# Patient Record
Sex: FEMALE | Race: WHITE | Employment: UNEMPLOYED | URBAN - METROPOLITAN AREA
[De-identification: names, ages, dates, MRNs, and addresses within clinical notes are randomized per-mention and may not be internally consistent; named-entity substitution may affect disease eponyms.]

---

## 2017-01-19 ENCOUNTER — HOSPITAL ENCOUNTER (OUTPATIENT)
Dept: NON INVASIVE DIAGNOSTICS | Age: 67
Discharge: HOME | End: 2017-01-20
Attending: INTERNAL MEDICINE | Admitting: INTERNAL MEDICINE
Payer: COMMERCIAL

## 2017-01-19 ENCOUNTER — ANESTHESIA EVENT (OUTPATIENT)
Dept: SURGERY | Age: 67
End: 2017-01-19
Payer: COMMERCIAL

## 2017-01-19 ENCOUNTER — SURGERY (OUTPATIENT)
Age: 67
End: 2017-01-19

## 2017-01-19 ENCOUNTER — ANESTHESIA (OUTPATIENT)
Dept: SURGERY | Age: 67
End: 2017-01-19
Payer: COMMERCIAL

## 2017-01-19 LAB
ACT BLD: 137 SECS (ref 79–138)
ACT BLD: 301 SECS (ref 79–138)
GLUCOSE BLD STRIP.AUTO-MCNC: 102 MG/DL (ref 65–100)
GLUCOSE BLD STRIP.AUTO-MCNC: 109 MG/DL (ref 65–100)
GLUCOSE BLD STRIP.AUTO-MCNC: 122 MG/DL (ref 65–100)
GLUCOSE BLD STRIP.AUTO-MCNC: 127 MG/DL (ref 65–100)
SERVICE CMNT-IMP: ABNORMAL

## 2017-01-19 PROCEDURE — 93613 INTRACARDIAC EPHYS 3D MAPG: CPT

## 2017-01-19 PROCEDURE — 77030018729 HC ELECTRD DEFIB PAD CARD -B

## 2017-01-19 PROCEDURE — 77030030806 HC PTCH ENSIT NAVX STJU -G

## 2017-01-19 PROCEDURE — C1731 CATH, EP, 20 OR MORE ELEC: HCPCS

## 2017-01-19 PROCEDURE — 74011250636 HC RX REV CODE- 250/636: Performed by: INTERNAL MEDICINE

## 2017-01-19 PROCEDURE — 82962 GLUCOSE BLOOD TEST: CPT

## 2017-01-19 PROCEDURE — 77030008684 HC TU ET CUF COVD -B: Performed by: NURSE ANESTHETIST, CERTIFIED REGISTERED

## 2017-01-19 PROCEDURE — 74011250637 HC RX REV CODE- 250/637: Performed by: INTERNAL MEDICINE

## 2017-01-19 PROCEDURE — 93656 COMPRE EP EVAL ABLTJ ATR FIB: CPT

## 2017-01-19 PROCEDURE — 93655 ICAR CATH ABLTJ DSCRT ARRHYT: CPT

## 2017-01-19 PROCEDURE — 76060000037 HC ANESTHESIA 3 TO 3.5 HR

## 2017-01-19 PROCEDURE — C1894 INTRO/SHEATH, NON-LASER: HCPCS

## 2017-01-19 PROCEDURE — 77030033352 HC TBNG IRR PMP COOL PNT STJU -B

## 2017-01-19 PROCEDURE — 77030011640 HC PAD GRND REM COVD -A

## 2017-01-19 PROCEDURE — 77030029065 HC DRSG HEMO QCLOT ZMED -B

## 2017-01-19 PROCEDURE — 74011250636 HC RX REV CODE- 250/636

## 2017-01-19 PROCEDURE — 77030034850

## 2017-01-19 PROCEDURE — C1893 INTRO/SHEATH, FIXED,NON-PEEL: HCPCS

## 2017-01-19 PROCEDURE — C1769 GUIDE WIRE: HCPCS

## 2017-01-19 PROCEDURE — 77030026438 HC STYL ET INTUB CARD -A: Performed by: NURSE ANESTHETIST, CERTIFIED REGISTERED

## 2017-01-19 PROCEDURE — 93325 DOPPLER ECHO COLOR FLOW MAPG: CPT

## 2017-01-19 PROCEDURE — C1766 INTRO/SHEATH,STRBLE,NON-PEEL: HCPCS

## 2017-01-19 PROCEDURE — 85347 COAGULATION TIME ACTIVATED: CPT

## 2017-01-19 PROCEDURE — 76210000006 HC OR PH I REC 0.5 TO 1 HR

## 2017-01-19 PROCEDURE — 77030010880 HC CBL EP SUPRME STJU -C

## 2017-01-19 PROCEDURE — C1759 CATH, INTRA ECHOCARDIOGRAPHY: HCPCS

## 2017-01-19 PROCEDURE — 77030013797 HC KT TRNSDUC PRSSR EDWD -A

## 2017-01-19 PROCEDURE — 77030019908 HC STETH ESOPH SIMS -A: Performed by: NURSE ANESTHETIST, CERTIFIED REGISTERED

## 2017-01-19 PROCEDURE — 77030003700 HC NDL TSEPTL STJU -C

## 2017-01-19 RX ORDER — DIPHENHYDRAMINE HYDROCHLORIDE 50 MG/ML
12.5 INJECTION, SOLUTION INTRAMUSCULAR; INTRAVENOUS AS NEEDED
Status: DISCONTINUED | OUTPATIENT
Start: 2017-01-19 | End: 2017-01-19 | Stop reason: HOSPADM

## 2017-01-19 RX ORDER — CLONAZEPAM 0.5 MG/1
0.5 TABLET ORAL
Status: DISCONTINUED | OUTPATIENT
Start: 2017-01-19 | End: 2017-01-20 | Stop reason: HOSPADM

## 2017-01-19 RX ORDER — SODIUM CHLORIDE, SODIUM LACTATE, POTASSIUM CHLORIDE, CALCIUM CHLORIDE 600; 310; 30; 20 MG/100ML; MG/100ML; MG/100ML; MG/100ML
25 INJECTION, SOLUTION INTRAVENOUS CONTINUOUS
Status: DISCONTINUED | OUTPATIENT
Start: 2017-01-19 | End: 2017-01-19 | Stop reason: HOSPADM

## 2017-01-19 RX ORDER — HEPARIN SODIUM 200 [USP'U]/100ML
500 INJECTION, SOLUTION INTRAVENOUS ONCE
Status: COMPLETED | OUTPATIENT
Start: 2017-01-19 | End: 2017-01-19

## 2017-01-19 RX ORDER — ATORVASTATIN CALCIUM 40 MG/1
40 TABLET, FILM COATED ORAL
Status: DISCONTINUED | OUTPATIENT
Start: 2017-01-19 | End: 2017-01-20 | Stop reason: HOSPADM

## 2017-01-19 RX ORDER — MIDAZOLAM HYDROCHLORIDE 1 MG/ML
INJECTION, SOLUTION INTRAMUSCULAR; INTRAVENOUS AS NEEDED
Status: DISCONTINUED | OUTPATIENT
Start: 2017-01-19 | End: 2017-01-19 | Stop reason: HOSPADM

## 2017-01-19 RX ORDER — PROPOFOL 10 MG/ML
INJECTION, EMULSION INTRAVENOUS AS NEEDED
Status: DISCONTINUED | OUTPATIENT
Start: 2017-01-19 | End: 2017-01-19 | Stop reason: HOSPADM

## 2017-01-19 RX ORDER — ONDANSETRON 2 MG/ML
4 INJECTION INTRAMUSCULAR; INTRAVENOUS
Status: DISCONTINUED | OUTPATIENT
Start: 2017-01-19 | End: 2017-01-20 | Stop reason: HOSPADM

## 2017-01-19 RX ORDER — VENLAFAXINE 37.5 MG/1
75 TABLET ORAL 2 TIMES DAILY WITH MEALS
Status: DISCONTINUED | OUTPATIENT
Start: 2017-01-19 | End: 2017-01-20 | Stop reason: HOSPADM

## 2017-01-19 RX ORDER — SODIUM CHLORIDE 9 MG/ML
INJECTION, SOLUTION INTRAVENOUS
Status: DISCONTINUED | OUTPATIENT
Start: 2017-01-19 | End: 2017-01-19 | Stop reason: HOSPADM

## 2017-01-19 RX ORDER — ONDANSETRON 2 MG/ML
4 INJECTION INTRAMUSCULAR; INTRAVENOUS AS NEEDED
Status: DISCONTINUED | OUTPATIENT
Start: 2017-01-19 | End: 2017-01-19 | Stop reason: HOSPADM

## 2017-01-19 RX ORDER — AMIODARONE HYDROCHLORIDE 200 MG/1
200 TABLET ORAL 2 TIMES DAILY
Status: CANCELLED | OUTPATIENT
Start: 2017-01-19

## 2017-01-19 RX ORDER — GUAIFENESIN 100 MG/5ML
81 LIQUID (ML) ORAL DAILY
Status: DISCONTINUED | OUTPATIENT
Start: 2017-01-20 | End: 2017-01-20 | Stop reason: HOSPADM

## 2017-01-19 RX ORDER — MORPHINE SULFATE 10 MG/ML
2 INJECTION, SOLUTION INTRAMUSCULAR; INTRAVENOUS
Status: DISCONTINUED | OUTPATIENT
Start: 2017-01-19 | End: 2017-01-19 | Stop reason: HOSPADM

## 2017-01-19 RX ORDER — HEPARIN SODIUM 10000 [USP'U]/100ML
INJECTION, SOLUTION INTRAVENOUS
Status: COMPLETED
Start: 2017-01-19 | End: 2017-01-19

## 2017-01-19 RX ORDER — SODIUM CHLORIDE 0.9 % (FLUSH) 0.9 %
5-10 SYRINGE (ML) INJECTION EVERY 8 HOURS
Status: DISCONTINUED | OUTPATIENT
Start: 2017-01-19 | End: 2017-01-20 | Stop reason: HOSPADM

## 2017-01-19 RX ORDER — LISINOPRIL 20 MG/1
20 TABLET ORAL DAILY
Status: DISCONTINUED | OUTPATIENT
Start: 2017-01-20 | End: 2017-01-20 | Stop reason: HOSPADM

## 2017-01-19 RX ORDER — HEPARIN SODIUM 1000 [USP'U]/ML
0-30000 INJECTION, SOLUTION INTRAVENOUS; SUBCUTANEOUS
Status: DISCONTINUED | OUTPATIENT
Start: 2017-01-19 | End: 2017-01-19 | Stop reason: HOSPADM

## 2017-01-19 RX ORDER — ACETAMINOPHEN 325 MG/1
650 TABLET ORAL
Status: DISCONTINUED | OUTPATIENT
Start: 2017-01-19 | End: 2017-01-20 | Stop reason: HOSPADM

## 2017-01-19 RX ORDER — HEPARIN SODIUM 10000 [USP'U]/100ML
2000 INJECTION, SOLUTION INTRAVENOUS CONTINUOUS
Status: DISCONTINUED | OUTPATIENT
Start: 2017-01-19 | End: 2017-01-19 | Stop reason: HOSPADM

## 2017-01-19 RX ORDER — SODIUM CHLORIDE 0.9 % (FLUSH) 0.9 %
5-10 SYRINGE (ML) INJECTION EVERY 8 HOURS
Status: CANCELLED | OUTPATIENT
Start: 2017-01-19

## 2017-01-19 RX ORDER — CARVEDILOL 12.5 MG/1
25 TABLET ORAL 2 TIMES DAILY WITH MEALS
Status: DISCONTINUED | OUTPATIENT
Start: 2017-01-19 | End: 2017-01-20 | Stop reason: HOSPADM

## 2017-01-19 RX ORDER — AMLODIPINE BESYLATE 5 MG/1
5 TABLET ORAL DAILY
Status: DISCONTINUED | OUTPATIENT
Start: 2017-01-20 | End: 2017-01-20 | Stop reason: HOSPADM

## 2017-01-19 RX ORDER — PROTAMINE SULFATE 10 MG/ML
25-100 INJECTION, SOLUTION INTRAVENOUS
Status: DISCONTINUED | OUTPATIENT
Start: 2017-01-19 | End: 2017-01-19 | Stop reason: HOSPADM

## 2017-01-19 RX ORDER — SODIUM CHLORIDE, SODIUM LACTATE, POTASSIUM CHLORIDE, CALCIUM CHLORIDE 600; 310; 30; 20 MG/100ML; MG/100ML; MG/100ML; MG/100ML
25 INJECTION, SOLUTION INTRAVENOUS CONTINUOUS
Status: CANCELLED | OUTPATIENT
Start: 2017-01-19 | End: 2017-01-20

## 2017-01-19 RX ORDER — BUMETANIDE 1 MG/1
1 TABLET ORAL DAILY
Status: DISCONTINUED | OUTPATIENT
Start: 2017-01-20 | End: 2017-01-20 | Stop reason: HOSPADM

## 2017-01-19 RX ORDER — HYDROMORPHONE HYDROCHLORIDE 1 MG/ML
.2-.5 INJECTION, SOLUTION INTRAMUSCULAR; INTRAVENOUS; SUBCUTANEOUS
Status: DISCONTINUED | OUTPATIENT
Start: 2017-01-19 | End: 2017-01-19 | Stop reason: HOSPADM

## 2017-01-19 RX ORDER — SODIUM CHLORIDE 0.9 % (FLUSH) 0.9 %
5-10 SYRINGE (ML) INJECTION AS NEEDED
Status: DISCONTINUED | OUTPATIENT
Start: 2017-01-19 | End: 2017-01-19 | Stop reason: HOSPADM

## 2017-01-19 RX ORDER — FENTANYL CITRATE 50 UG/ML
INJECTION, SOLUTION INTRAMUSCULAR; INTRAVENOUS AS NEEDED
Status: DISCONTINUED | OUTPATIENT
Start: 2017-01-19 | End: 2017-01-19 | Stop reason: HOSPADM

## 2017-01-19 RX ORDER — ENOXAPARIN SODIUM 100 MG/ML
100 INJECTION SUBCUTANEOUS ONCE
Status: COMPLETED | OUTPATIENT
Start: 2017-01-19 | End: 2017-01-19

## 2017-01-19 RX ORDER — FENTANYL CITRATE 50 UG/ML
25 INJECTION, SOLUTION INTRAMUSCULAR; INTRAVENOUS
Status: DISCONTINUED | OUTPATIENT
Start: 2017-01-19 | End: 2017-01-19 | Stop reason: HOSPADM

## 2017-01-19 RX ORDER — PREGABALIN 100 MG/1
100 CAPSULE ORAL 2 TIMES DAILY
Status: DISCONTINUED | OUTPATIENT
Start: 2017-01-19 | End: 2017-01-20 | Stop reason: HOSPADM

## 2017-01-19 RX ORDER — MELATONIN
1000 DAILY
Status: DISCONTINUED | OUTPATIENT
Start: 2017-01-20 | End: 2017-01-20 | Stop reason: HOSPADM

## 2017-01-19 RX ORDER — SUCCINYLCHOLINE CHLORIDE 20 MG/ML
INJECTION INTRAMUSCULAR; INTRAVENOUS AS NEEDED
Status: DISCONTINUED | OUTPATIENT
Start: 2017-01-19 | End: 2017-01-19 | Stop reason: HOSPADM

## 2017-01-19 RX ORDER — OXYCODONE AND ACETAMINOPHEN 5; 325 MG/1; MG/1
1 TABLET ORAL
Status: DISCONTINUED | OUTPATIENT
Start: 2017-01-19 | End: 2017-01-20 | Stop reason: HOSPADM

## 2017-01-19 RX ORDER — SODIUM CHLORIDE 0.9 % (FLUSH) 0.9 %
5-10 SYRINGE (ML) INJECTION AS NEEDED
Status: CANCELLED | OUTPATIENT
Start: 2017-01-19

## 2017-01-19 RX ORDER — SODIUM CHLORIDE 0.9 % (FLUSH) 0.9 %
5-10 SYRINGE (ML) INJECTION AS NEEDED
Status: DISCONTINUED | OUTPATIENT
Start: 2017-01-19 | End: 2017-01-20 | Stop reason: HOSPADM

## 2017-01-19 RX ADMIN — SODIUM CHLORIDE: 9 INJECTION, SOLUTION INTRAVENOUS at 12:15

## 2017-01-19 RX ADMIN — Medication 10 ML: at 22:10

## 2017-01-19 RX ADMIN — SODIUM CHLORIDE: 9 INJECTION, SOLUTION INTRAVENOUS at 15:48

## 2017-01-19 RX ADMIN — FENTANYL CITRATE 25 MCG: 50 INJECTION, SOLUTION INTRAMUSCULAR; INTRAVENOUS at 16:03

## 2017-01-19 RX ADMIN — FENTANYL CITRATE 25 MCG: 50 INJECTION, SOLUTION INTRAMUSCULAR; INTRAVENOUS at 16:01

## 2017-01-19 RX ADMIN — ENOXAPARIN SODIUM 100 MG: 100 INJECTION, SOLUTION INTRAVENOUS; SUBCUTANEOUS at 23:01

## 2017-01-19 RX ADMIN — ACETAMINOPHEN 650 MG: 325 TABLET, FILM COATED ORAL at 17:43

## 2017-01-19 RX ADMIN — FENTANYL CITRATE 100 MCG: 50 INJECTION, SOLUTION INTRAMUSCULAR; INTRAVENOUS at 12:58

## 2017-01-19 RX ADMIN — SUCCINYLCHOLINE CHLORIDE 160 MG: 20 INJECTION INTRAMUSCULAR; INTRAVENOUS at 12:58

## 2017-01-19 RX ADMIN — VENLAFAXINE 75 MG: 37.5 TABLET ORAL at 18:12

## 2017-01-19 RX ADMIN — PREGABALIN 100 MG: 100 CAPSULE ORAL at 19:08

## 2017-01-19 RX ADMIN — HEPARIN SODIUM 1000 UNITS: 200 INJECTION, SOLUTION INTRAVENOUS at 13:32

## 2017-01-19 RX ADMIN — HEPARIN SODIUM 12000 UNITS: 1000 INJECTION, SOLUTION INTRAVENOUS; SUBCUTANEOUS at 13:46

## 2017-01-19 RX ADMIN — MIDAZOLAM HYDROCHLORIDE 2 MG: 1 INJECTION, SOLUTION INTRAMUSCULAR; INTRAVENOUS at 12:54

## 2017-01-19 RX ADMIN — PROTAMINE SULFATE 60 MG: 10 INJECTION, SOLUTION INTRAVENOUS at 15:47

## 2017-01-19 RX ADMIN — PROPOFOL 120 MG: 10 INJECTION, EMULSION INTRAVENOUS at 12:58

## 2017-01-19 RX ADMIN — CARVEDILOL 25 MG: 12.5 TABLET, FILM COATED ORAL at 18:12

## 2017-01-19 RX ADMIN — HEPARIN SODIUM 5000 UNITS: 1000 INJECTION, SOLUTION INTRAVENOUS; SUBCUTANEOUS at 14:33

## 2017-01-19 RX ADMIN — Medication 10 ML: at 18:13

## 2017-01-19 RX ADMIN — ATORVASTATIN CALCIUM 40 MG: 40 TABLET, FILM COATED ORAL at 22:04

## 2017-01-19 RX ADMIN — PROPOFOL 50 MG: 10 INJECTION, EMULSION INTRAVENOUS at 13:06

## 2017-01-19 RX ADMIN — OXYCODONE HYDROCHLORIDE AND ACETAMINOPHEN 1 TABLET: 5; 325 TABLET ORAL at 19:08

## 2017-01-19 RX ADMIN — CLONAZEPAM 0.5 MG: 0.5 TABLET ORAL at 22:04

## 2017-01-19 RX ADMIN — HEPARIN SODIUM 1000 UNITS/HR: 10000 INJECTION, SOLUTION INTRAVENOUS at 13:46

## 2017-01-19 NOTE — PERIOP NOTES
Handoff Report from Operating Room to PACU    Report received from Waylon Reyes RN and Alicia Nunn CRNA regarding Joyce Keith. Surgeon(s):  Case Anesthesia  And Procedure(s) (LRB):  PACU/RECOVERY (N/A)  confirmed   with allergies, drains and dressings discussed. Anesthesia type, drugs, patient history, complications, estimated blood loss, vital signs, intake and output, and last pain medication, lines and temperature were reviewed.

## 2017-01-19 NOTE — IP AVS SNAPSHOT
[address] [facility] 
[phone] Patient: Thiago Celaya MRN: [id number] HIT:7/26/1715 You are allergic to the following Allergen Reactions Sulfa (Sulfonamide Antibiotics) Swelling Amoxicillin Swelling Recent Documentation Height Weight Breastfeeding? BMI OB Status Smoking Status 1.753 m 99.8 kg No 32.49 kg/m2 Hysterectomy Never Smoker Emergency Contacts Name Discharge Info Relation Home Work Mobile Sameul Elder CAREGIVER [3] Spouse [3] [phone] About your hospitalization You were admitted on:  January 19, 2017 You last received care in the:  [facility] You were discharged on:  January 20, 2017 Unit phone number:  [phone] Why you were hospitalized Your primary diagnosis was:  Not on File Providers Seen During Your Hospitalizations Provider Role Specialty Primary office phone Tanna Estrada MD Attending Provider Cardiology [phone] Your Primary Care Physician (PCP) Primary Care Physician Office Phone Office Fax Trina Barrosl [phone] Follow-up Information Follow up With Details Comments Contact Info MD Miguel Garcia [address] [facility] 
[phone] Current Discharge Medication List  
  
START taking these medications Dose & Instructions Dispensing Information Comments Morning Noon Evening Bedtime  
 pantoprazole 40 mg tablet Commonly known as:  PROTONIX Your next dose is: Today, Tomorrow Other:  _________ Dose:  40 mg Take 1 Tab by mouth daily. Quantity:  30 Tab Refills:  0  
     
   
   
   
  
 potassium chloride 20 mEq tablet Commonly known as:  K-DURCONCHISOR-CON Your next dose is: Today, Tomorrow Other:  _________ Dose:  20 mEq Take 1 Tab by mouth daily. Quantity:  7 Tab Refills:  0  
     
   
   
   
  
 sucralfate 100 mg/mL suspension Commonly known as:  Georgeana Mallet Your next dose is: Today, Tomorrow Other:  _________ Dose:  1 g Take 10 mL by mouth two (2) times a day. Quantity:  600 mL Refills:  0 CONTINUE these medications which have NOT CHANGED Dose & Instructions Dispensing Information Comments Morning Noon Evening Bedtime ALLEGRA 180 mg tablet Generic drug:  fexofenadine Your next dose is: Today, Tomorrow Other:  _________ Dose:  180 mg Take 180 mg by mouth daily as needed. Refills:  0  
     
   
   
   
  
 amLODIPine 5 mg tablet Commonly known as:  Johnnie Vicente Your next dose is: Today, Tomorrow Other:  _________ Refills:  0  
     
   
   
   
  
 apixaban 5 mg tablet Commonly known as:  Flash Hassan Your next dose is: Today, Tomorrow Other:  _________ Dose:  5 mg Take 1 Tab by mouth two (2) times a day. Quantity:  60 Tab Refills:  11  
     
   
   
   
  
 aspirin 81 mg chewable tablet Your next dose is: Today, Tomorrow Other:  _________ Dose:  81 mg Take 81 mg by mouth daily. Refills:  0  
     
   
   
   
  
 atorvastatin 40 mg tablet Commonly known as:  LIPITOR Your next dose is: Today, Tomorrow Other:  _________ Dose:  40 mg Take 1 Tab by mouth nightly. Quantity:  30 Tab Refills:  12  
     
   
   
   
  
 bumetanide 1 mg tablet Commonly known as:  Aminata Garcia Your next dose is: Today, Tomorrow Other:  _________ Take 1 tablet by mouth in the morning on Tuesday and Thursday, 2 tablets on Monday, Wednesday, and Friday. Refills:  0  
     
   
   
   
  
 carvedilol 25 mg tablet Commonly known as:  Hafsa Locket  
   
 Your next dose is: Today, Tomorrow Other:  _________ Dose:  25 mg Take 25 mg by mouth two (2) times daily (with meals). Indications: HYPERTENSION Refills:  0 CENTRUM SILVER Tab tablet Generic drug:  multivitamins-minerals-lutein Your next dose is: Today, Tomorrow Other:  _________ Take  by mouth. Refills:  0  
     
   
   
   
  
 clonazePAM 0.5 mg tablet Commonly known as:  Reubens Boop Your next dose is: Today, Tomorrow Other:  _________ Dose:  0.5 mg Take 0.5 mg by mouth nightly. Refills:  0  
     
   
   
   
  
 lisinopril 20 mg tablet Commonly known as:  Donnald Code Your next dose is: Today, Tomorrow Other:  _________ Dose:  20 mg Take 1 Tab by mouth daily. Quantity:  30 Tab Refills:  12 LYRICA 100 mg capsule Generic drug:  pregabalin Your next dose is: Today, Tomorrow Other:  _________ TAKE ONE CAPSULE BY MOUTH 3 TIMES A DAY Quantity:  90 Cap Refills:  2 Not to exceed 3 additional fills before 12/25/2016  
    
   
   
   
  
 venlafaxine 75 mg tablet Commonly known as:  [facility] Your next dose is: Today, Tomorrow Other:  _________ TAKE 2 TABLETS TWICE A DAY Quantity:  360 Tab Refills:  3 VITAMIN D3 1,000 unit Cap Generic drug:  cholecalciferol Your next dose is: Today, Tomorrow Other:  _________ Take  by mouth. Refills:  0 STOP taking these medications   
 amiodarone 100 mg tablet Commonly known as:  Griselda Borer Where to Get Your Medications Information on where to get these meds will be given to you by the nurse or doctor. ! Ask your nurse or doctor about these medications  
  pantoprazole 40 mg tablet  
 potassium chloride 20 mEq tablet  
 sucralfate 100 mg/mL suspension Discharge Instructions POST-EP STUDY AND ABLATION DISCHARGE INSTRUCTIONS: 
 
You had an atrial fibrillation ablation using radiofrequency energy on 1/19 with Dr. Nikolas Mcintosh. Do not drive, operate any machinery, or sign any legal documents for 24 hours after your procedure. You must have someone to drive you home. You may take a shower 24 hours after your cardiac procedure. Be sure to get the dressing wet and then remove it; gently wash the area with warm soapy water. Pat dry and leave open to air. To help prevent infections, be sure to keep the cath site clean and dry. No lotions, creams, powders, ointments, etc. in the cath site for approximately 1 week. ? Do not take a tub bath, get in a hot tub or swimming pool for approximately 5 days or until the cath site is completely healed. ? No strenuous activity or heavy lifting over 20 lbs. for 7 days. ? After your procedure, some bruising or discomfort is common during the healing process. Tylenol, 1-2 tablets every 6 hours as needed, is recommended if you experience any discomfort. If you experience any signs or symptoms of infection such as fever, chills, or poorly healing incision, persistent tenderness or swelling in the groin, redness and/or warmth to the touch, numbness, significant tingling or pain at the groin site or affected extremity, rash, drainage from the site, or if the leg feels tight or swollen, call your physician right away. ? If bleeding at the site occurs, take a clean gauze pad and apply direct pressure to the groin just above the puncture site, and call your physician right away. If your procedure involved ablation therapy, you may feel some mild or vague chest discomfort due to delivery of heat therapy to the heart muscle. This should resolve in 1-2 days.   If it gets worse or is associated with shortness of breath, dizziness, loss of consciousness, call your physician right away or call 911 if emergency medical care is needed. Discharge Orders None Introducing [facility] SERVICES! Elder Gontiti introduces [employer] patient portal. Now you can access parts of your medical record, email your doctor's office, and request medication refills online. 1. In your internet browser, go to [email or web address] 2. Click on the First Time User? Click Here link in the Sign In box. You will see the New Member Sign Up page. 3. Enter your [employer] Access Code exactly as it appears below. You will not need to use this code after youve completed the sign-up process. If you do not sign up before the expiration date, you must request a new code. · [employer] Access Code: [email or web address] Expires: 3/7/2017  8:55 AM 
 
4. Enter the last four digits of your Social Security Number (xxxx) and Date of Birth (mm/dd/yyyy) as indicated and click Submit. You will be taken to the next sign-up page. 5. Create a [employer] ID. This will be your [employer] login ID and cannot be changed, so think of one that is secure and easy to remember. 6. Create a [employer] password. You can change your password at any time. 7. Enter your Password Reset Question and Answer. This can be used at a later time if you forget your password. 8. Enter your e-mail address. You will receive e-mail notification when new information is available in [address] 9. Click Sign Up. You can now view and download portions of your medical record. 10. Click the Download Summary menu link to download a portable copy of your medical information. If you have questions, please visit the Frequently Asked Questions section of the [employer] website. Remember, [employer] is NOT to be used for urgent needs. For medical emergencies, dial 911. Now available from your iPhone and Android! General Information Please provide this summary of care documentation to your next provider. Patient Signature:  ____________________________________________________________ Date:  ____________________________________________________________  
  
Jacqlyn Newcomer Provider Signature:  ____________________________________________________________ Date:  ____________________________________________________________

## 2017-01-19 NOTE — ANESTHESIA POSTPROCEDURE EVALUATION
Post-Anesthesia Evaluation and Assessment    Patient: Patrice Sandhoff MRN: [id number]  SSN: [id number]    YOB: 1950  Age: 61 y.o. Sex: female       Cardiovascular Function/Vital Signs  Visit Vitals    /65 (BP 1 Location: Right arm, BP Patient Position: At rest)    Pulse 60    Temp 37.3 °C (99.1 °F)    Resp 16    Ht 5' 9\" (1.753 m)    Wt 99.8 kg (220 lb)    SpO2 93%    Breastfeeding No    BMI 32.49 kg/m2       Patient is status post general anesthesia for Procedure(s):  PACU/RECOVERY. Nausea/Vomiting: None    Postoperative hydration reviewed and adequate. Pain:  Pain Scale 1: Numeric (0 - 10) (01/19/17 1640)  Pain Intensity 1: 0 (01/19/17 1640)   Managed    Neurological Status:   Neuro (WDL): Exceptions to WDL (01/19/17 1620)  Neuro  Neurologic State: Drowsy; Eyes open to voice (01/19/17 1620)  Orientation Level: Oriented X4 (01/19/17 1620)  Cognition: Follows commands (01/19/17 1620)  Speech: Clear (01/19/17 1620)  LUE Motor Response: Purposeful (01/19/17 1620)  LLE Motor Response: Purposeful (01/19/17 1620)  RUE Motor Response: Purposeful (01/19/17 1620)  RLE Motor Response: Purposeful (01/19/17 1620)   At baseline    Mental Status and Level of Consciousness: Arousable    Pulmonary Status:   O2 Device: Nasal cannula (01/19/17 1645)   Adequate oxygenation and airway patent    Complications related to anesthesia: None    Post-anesthesia assessment completed.  No concerns    Signed By: Adri Lai MD     January 19, 2017

## 2017-01-19 NOTE — ANESTHESIA PREPROCEDURE EVALUATION
Anesthetic History   No history of anesthetic complications            Review of Systems / Medical History  Patient summary reviewed, nursing notes reviewed and pertinent labs reviewed    Pulmonary            Asthma        Neuro/Psych         Headaches (   Migraine with aura, without mention of intractable migraine without mention of status migrainosus) and psychiatric history     Cardiovascular    Hypertension      CHF  Dysrhythmias : atrial fibrillation  Pacemaker (for SSS)    Exercise tolerance: >4 METS  Comments: EF 20% in 2014 with mild MR  The last echo in 10/2015 showed an ejection fraction of 45% (an improvement  from 20% in 2014) with a left atrial diameter 4.1 cm.    GI/Hepatic/Renal     GERD           Endo/Other    Diabetes (diet controlled)    Obesity and arthritis     Other Findings   Comments: Spinal stenosis  Unspecified hereditary and idiopathic peripheral neuropathy      IBS         Physical Exam    Airway  Mallampati: III  TM Distance: 4 - 6 cm  Neck ROM: normal range of motion, short neck   Mouth opening: Diminished (comment)    Comments: Prominent overbite-suggest use/availability of glidescope Cardiovascular    Rhythm: irregular  Rate: normal         Dental         Pulmonary  Breath sounds clear to auscultation               Abdominal  GI exam deferred       Other Findings            Anesthetic Plan    ASA: 4  Anesthesia type: general            Anesthetic plan and risks discussed with: Patient and Family

## 2017-01-19 NOTE — PROCEDURES
[address]  [phone]    Patient ID:  Patient: Glenda Bauer  MRN: [id number]  Age: 61 y.o.  : 1950  Gender: female  Study Date: 2017    History:  This is a female with paroxysmal symptomatic atrial fibrillation despite amiodarone here for elective EP study and ablation.      Procedures Performed:    1. Comprehensive EP study with transseptal access L/R atrium, pulmonary vein isolation (PVI) (12846)    2. Additional left atrial ablation for atrial fibrillation/roof flutter (LA roof line) (10393)   3. Additional left atrial ablation for atypical atrial flutter (anterior LA line) (08074)    4. Intracardiac electrophysiologic 3-D mapping (75919)   5. Use and interpretation of intra cardiac echocardiography (63305-88)       Summary:    1. Successful pulmonary vein isolation for treatment of atrial fibrillation. Additional substrate work was done in the left atrium as noted above and below for left atrial flutter.    2. Antegrade conduction was midline and without preexcitation.      Plan:    1. Use of lovenox until therapeutic on oral anticoagulant.    2. Carafate and proton pump inhibitor for 4 weeks to protect from esophageal injury.        Follow up Plan:    1. F/U in office in 2 weeks.      Procedure description:    After consent was obtained, the patient was brought to the EP lab and sedated by general anesthesia by the anesthesiologist who remained in constant attendance throughout the case. One sheath was inserted into the left femoral vein and three sheaths into the right femoral vein in the usual fashion. A deflectable duodecapolar coronary sinus catheter was used for pacing and recording from the left atrium as well as right atrium. Initial catheters advanced into the heart under fluoroscopic guidance.   A roving catheter was used to pace and record from the right atrium, the His bundle location, and the right ventricular apex.      The baseline rhythm was sinus when atrial pacing was inhibited (, , QRS 94,  msec). The WBCL was 430 msec and AVNERP and AERP, 500/390 and 500/200 msec, respectively. Dual AV node physiology was not seen.     An intra-cardiac echo probe was advanced into the right atrium and used to visualize the valves, the left atrium, the pulmonary veins, and the fossa ovale. There was a trivial pericardial effusion. Under fluoroscopic and echocardiographic guidance, a double transseptal puncture was carried out in the usual fashion using the SL-1 sheath and BRK-1 needle. An Agilis sheath was exchanged over a wire and the SL-1 sheath was kept during the second access. Heparin was given after the first transseptal puncture and boluses and infusion were given with a goal ACT of >300 sec.        A deflectable, irrigated ablation catheter and a 20 pole spiral catheter were advanced into the left atrium. Using the [address] [place] [address] three dimensional electroanatomic mapping system, a 3D map was created of the left atrium, all pulmonary veins, and the left atrial appendage.      The ostia of the 4 pulmonary veins was mapped and revealed electrical isolation of the right veins. There was evidence of incomplete isolation of the left veins. So, PVI was performed. Using a spiral-guided approach, a series of ablation lesions were given around the ostium of both left pulmonary veins until isolation was achieved as evidenced by lack of recorded pulmonary vein potentials, entrance block, and exit block from pacing within the pulmonary veins. Pulmonary vein isolation was confirmed by placing the spiral catheter in all veins after ostial ablation was complete.      Additional ablation was performed distinct from the PVI. A left atrial roof line was performed using linear ablation with radiofrequency energy, bridging the superior pulmonary veins.  This was performed as substrate work for atrial fibrillation.     Additional ablation was performed beyond PVI for a separate mechanism, a presumed left atrial flutter seen on prior pacemaker monitoring. Linear ablation along the anterior LA from the roof line to the anterior mitral valve was performed. This line was able to block conduction across the mitral isthmus as determined by differential pacing. Pacing from both sides of the cavotricuspid isthmus revealed an intact CTI line from a prior ablation. No work was done here.     During ablation, the temperature was continually monitored in the esophagus and lesions were stopped if there was any temperature rise. The largest increase was to 38.8 degrees Celsius on one occasion.      Once the ablation lesion set was completed, the patient remained in normal sinus rhythm. The catheters were withdrawn to the inferior vena cava and the heparin was reversed with a test dose of protamine followed by the full dose to a total of 60 mg.      Repeat scanning with the intracardiac ultrasound did not show any significant change to the trivial pericardial effusion. The patient was extubated after his sheaths removed and sent to the recovery area.      Preoperative Diagnosis: As above. Postoperative Diagnosis: As above. Procedure: As above. Surgeon(s) and Role: Joyce Hidalgo MD - Primary    Anesthesia:  General.  Estimated Blood Loss: 40 cc. Specimens: * No specimens in log *    Findings: As above.   Complications: None.      Ablation time:  32.5 minutes with 21 applications  Fluoroscopy time: 19.1 minutes  Case time:  2 hours 11 minutes

## 2017-01-19 NOTE — H&P
[facility] [address]   HISTORY AND PHYSICAL       Name:  Miguelito Ramon   MR#:  [id number]   :  1950   Account #:  [de-identified]        Date of Adm:  2017       CHIEF COMPLAINT: Symptomatic atrial fibrillation. HISTORY OF PRESENT ILLNESS: This is a 56-year-old female with a   history of hypertensive heart disease with heart failure, mixed ischemic   and tachycardia mediated cardiomyopathy diagnosed in , prior   percutaneous intervention to the left anterior descending artery, dual-  chamber pacemaker implanted for sick sinus syndrome, here for atrial   fibrillation ablation. She has had a history of a prior atrial fibrillation   ablation in 2016 using a cryoballoon approach. After that   ablation, she had some short episodes of arrhythmia which seemed to   subside. Her amiodarone was reduced and then she had an increased   burden of arrhythmia. She is here today for more comprehensive ablation for recurrent atrial   fibrillation. ALLERGIES:   1. SULFA DRUGS. 2. AUGMENTIN. FAMILY HISTORY: Noncontributory. SOCIAL HISTORY: Noncontributory. REVIEW OF SYSTEMS: Noncontributory except noted as above. PHYSICAL EXAMINATION   VITAL SIGNS: Pulse 60, blood pressure 148/79, respirations 17,   oxygen saturation 97% on room air. GENERAL: Nondiaphoretic, not in acute distress. NECK: Supple, no palpable thyromegaly. CHEST: The left-sided pacemaker site is without evidence of erosion. RESPIRATORY: Unlabored, clear to auscultation bilaterally anteriorly. CARDIAC: Regular rate and rhythm. No murmur, rub, or gallop. No   peripheral edema. Palpable radial pulses bilaterally. ABDOMEN: Soft, nontender, nondistended. EXTREMITIES: No cyanosis or clubbing. MUSCULOSKELETAL: Walks with a cane. NEUROLOGIC: Awake, appropriate, grossly nonfocal. No resting   tremor.    TELEMETRY: Atrial pacing at 60 beats per minute. LABORATORY DATA: Outpatient labs were reviewed. IMPRESSION:   1. Paroxysmal symptomatic atrial fibrillation despite amiodarone   suppression therapy. 2. Prior atrial fibrillation ablation using the cryoballoon approach in   2016. She also had radiofrequency ablation of induced right atrial   flutter at that time. 3. Hypertensive heart disease with heart failure. 4. Chronic diastolic heart failure. 5. Chronic anticoagulation with Eliquis as well as chronic aspirin   therapy. 6. Recovered ischemic cardiomyopathy with a history of non-ST   elevation MI and proximal left anterior descending artery stenting in   April 2014. She has a mixed ischemic and tachycardic,   cardiomyopathy with improvement in ejection fraction since 2014. RECOMMENDATIONS: Given the potential benefits, risks, and   alternatives, she agrees to proceed with transesophageal echo. If there   is no intracardiac thrombus, then she will have a catheter ablation for   atrial fibrillation. Will have to do more substrate work and confirm   isolation of the pulmonary veins. MD Carlos Enrique Call / Faby   D:  01/19/2017   13:22   T:  01/19/2017   14:00   Job #:  [id number]

## 2017-01-19 NOTE — PROCEDURES
[address]  [phone]    Patient ID:  Patient: Sunil Alvarado  MRN: [id number]  Age: 61 y.o.  : 1950  Gender: female  Study Date: 2017    History: This is a female with recurrent symptomatic atrial fibrillation, off anticoagulation, in anticipation of an atrial fibrillation ablation. She will be screened for intracardiac thrombus.      PROCEDURE: Transesophageal echo, complete.     The patient was sedated by the anesthesiologist with general anesthesia. The probe was passed easily into the esophagus and images taken. The study included complete 2D imaging, M-mode, complete spectral Doppler, and color Doppler. Image quality was adequate. At the end, the probe was removed without issue. No complications.      Preoperative Diagnosis: As above. Postoperative Diagnosis: As above. Procedure: As above. Surgeon(s) and Role: Roberth Swain MD - Primary   Anesthesia:  General by the anesthesiologist.  Estimated Blood Loss: <5 cc. Specimens: * No specimens in log *   Findings: As below. Complications: None.         LEFT VENTRICLE: Size was normal. Ejection fraction was estimated to be 50%. There was mild concentric left ventricular hypertrophy. RIGHT VENTRICLE: The size was normal. Systolic function was normal. A pacemaker lead was seen coursing across the tricuspid valve into the cavity. LEFT ATRIUM: Size was mildly dilated. No thrombus in the left atrium or left atrial appendage. DOPPLER: Normal exit velocity was seen in sinus rhythm. RIGHT ATRIUM: Size was mildly dilated.  Pacemaker lead noted.     INTERATRIAL SEPTUM: No evidence of aneurysm, septal defect, or PFO by color flow doppler. MITRAL VALVE: Normal valve structure. DOPPLER: There was no evidence for stenosis. There was mild nonrheumatic regurgitation. No valve prolapse. AORTIC VALVE: The valve was trileaflet. DOPPLER: There was no stenosis.  There was no regurgitation. TRICUSPID VALVE: Normal valve structure. DOPPLER: There was trivial regurgitation. Could not obtain a reliable peak tricuspid regurgitant velocity to estimate pulmonary artery pressures. PULMONIC VALVE:  Poorly-visualized, no obvious defect. DOPPLER: There was trivial regurgitation. AORTA: The root exhibited normal size. The thoracic aorta had mild non-mobile plaque, and NO dissection or aneurysm. PERICARDIUM: There was no pericardial thickening or significant pericardial effusion.         IMPRESSION:  1. No intracardiac thrombus. 2. Mild non-rheumatic mitral valve regurgitation. 3. EF 50%. 4. Pacemaker leads visualized.

## 2017-01-19 NOTE — PROGRESS NOTES
Cardiac Cath Lab Recovery Arrival Note:      Kelsie Babinski arrived to Cardiac Cath Lab, Recovery Area. Staff introduced to patient. Patient identifiers verified with NAME and DATE OF BIRTH. Procedure verified with patient. Consent forms reviewed and signed by patient or authorized representative and verified. Allergies verified. Patient and family oriented to department. Patient and family informed of procedure and plan of care. Questions answered with review. Patient prepped for procedure, per orders from physician, prior to arrival.    Patient on cardiac monitor, non-invasive blood pressure, SPO2 monitor. On room air. Patient is A&Ox 3. Patient reports no complaints. Patient in stretcher, in low position, with side rails up, call bell within reach, patient instructed to call if assistance as needed. Patient prep in: [address], [place]. Family in: waiting room.    Prep by: Sheba Wilson RN

## 2017-01-20 VITALS
TEMPERATURE: 97.6 F | SYSTOLIC BLOOD PRESSURE: 110 MMHG | DIASTOLIC BLOOD PRESSURE: 59 MMHG | BODY MASS INDEX: 32.58 KG/M2 | WEIGHT: 220 LBS | RESPIRATION RATE: 18 BRPM | OXYGEN SATURATION: 95 % | HEIGHT: 69 IN | HEART RATE: 60 BPM

## 2017-01-20 LAB
ANION GAP BLD CALC-SCNC: 8 MMOL/L (ref 5–15)
BUN SERPL-MCNC: 17 MG/DL (ref 6–20)
BUN/CREAT SERPL: 13 (ref 12–20)
CALCIUM SERPL-MCNC: 7.9 MG/DL (ref 8.5–10.1)
CHLORIDE SERPL-SCNC: 111 MMOL/L (ref 97–108)
CO2 SERPL-SCNC: 29 MMOL/L (ref 21–32)
CREAT SERPL-MCNC: 1.33 MG/DL (ref 0.55–1.02)
ERYTHROCYTE [DISTWIDTH] IN BLOOD BY AUTOMATED COUNT: 15.2 % (ref 11.5–14.5)
GLUCOSE BLD STRIP.AUTO-MCNC: 97 MG/DL (ref 65–100)
GLUCOSE SERPL-MCNC: 98 MG/DL (ref 65–100)
HCT VFR BLD AUTO: 31.7 % (ref 35–47)
HGB BLD-MCNC: 10.5 G/DL (ref 11.5–16)
MCH RBC QN AUTO: 29.7 PG (ref 26–34)
MCHC RBC AUTO-ENTMCNC: 33.1 G/DL (ref 30–36.5)
MCV RBC AUTO: 89.8 FL (ref 80–99)
PLATELET # BLD AUTO: 139 K/UL (ref 150–400)
POTASSIUM SERPL-SCNC: 3 MMOL/L (ref 3.5–5.1)
RBC # BLD AUTO: 3.53 M/UL (ref 3.8–5.2)
SERVICE CMNT-IMP: NORMAL
SODIUM SERPL-SCNC: 148 MMOL/L (ref 136–145)
WBC # BLD AUTO: 6.1 K/UL (ref 3.6–11)

## 2017-01-20 PROCEDURE — 74011250637 HC RX REV CODE- 250/637: Performed by: INTERNAL MEDICINE

## 2017-01-20 PROCEDURE — 82962 GLUCOSE BLOOD TEST: CPT

## 2017-01-20 PROCEDURE — 85027 COMPLETE CBC AUTOMATED: CPT | Performed by: INTERNAL MEDICINE

## 2017-01-20 PROCEDURE — 80048 BASIC METABOLIC PNL TOTAL CA: CPT | Performed by: INTERNAL MEDICINE

## 2017-01-20 PROCEDURE — 36415 COLL VENOUS BLD VENIPUNCTURE: CPT | Performed by: INTERNAL MEDICINE

## 2017-01-20 RX ORDER — POTASSIUM CHLORIDE 20 MEQ/1
40 TABLET, EXTENDED RELEASE ORAL
Status: COMPLETED | OUTPATIENT
Start: 2017-01-20 | End: 2017-01-20

## 2017-01-20 RX ORDER — SUCRALFATE 1 G/10ML
1 SUSPENSION ORAL 2 TIMES DAILY
Qty: 600 ML | Refills: 0 | Status: SHIPPED | OUTPATIENT
Start: 2017-01-20 | End: 2018-01-22 | Stop reason: ALTCHOICE

## 2017-01-20 RX ORDER — PANTOPRAZOLE SODIUM 40 MG/1
40 TABLET, DELAYED RELEASE ORAL DAILY
Qty: 30 TAB | Refills: 0 | Status: SHIPPED | OUTPATIENT
Start: 2017-01-20 | End: 2018-02-22 | Stop reason: ALTCHOICE

## 2017-01-20 RX ORDER — POTASSIUM CHLORIDE 20 MEQ/1
20 TABLET, EXTENDED RELEASE ORAL DAILY
Qty: 7 TAB | Refills: 0 | Status: SHIPPED | OUTPATIENT
Start: 2017-01-20 | End: 2018-02-22 | Stop reason: ALTCHOICE

## 2017-01-20 RX ADMIN — Medication 10 ML: at 04:45

## 2017-01-20 RX ADMIN — ASPIRIN 81 MG 81 MG: 81 TABLET ORAL at 08:45

## 2017-01-20 RX ADMIN — PREGABALIN 100 MG: 100 CAPSULE ORAL at 08:45

## 2017-01-20 RX ADMIN — VITAMIN D, TAB 1000IU (100/BT) 1000 UNITS: 25 TAB at 08:45

## 2017-01-20 RX ADMIN — CARVEDILOL 25 MG: 12.5 TABLET, FILM COATED ORAL at 08:45

## 2017-01-20 RX ADMIN — BUMETANIDE 1 MG: 1 TABLET ORAL at 08:45

## 2017-01-20 RX ADMIN — POTASSIUM CHLORIDE 40 MEQ: 20 TABLET, EXTENDED RELEASE ORAL at 09:34

## 2017-01-20 RX ADMIN — MULTIPLE VITAMINS W/ MINERALS TAB 1 TABLET: TAB at 08:46

## 2017-01-20 RX ADMIN — VENLAFAXINE 75 MG: 37.5 TABLET ORAL at 08:46

## 2017-01-20 RX ADMIN — AMLODIPINE BESYLATE 5 MG: 5 TABLET ORAL at 08:45

## 2017-01-20 RX ADMIN — LISINOPRIL 20 MG: 20 TABLET ORAL at 08:45

## 2017-01-20 RX ADMIN — APIXABAN 5 MG: 5 TABLET, FILM COATED ORAL at 08:45

## 2017-01-20 NOTE — PROGRESS NOTES
1900 - Bedside report from [facility]. Paced. Percocet for back pain. Bedrest, bilat groins benign. +PPP. 2200 - OOB to chair and then walked to door to chair to bed with cane. No chest pain or shortness of breath. Paced on monitor. bilat groins benign. 0700 - Bedside report to RN. Paced.

## 2017-01-20 NOTE — PROGRESS NOTES
S/p Afib ablation using RFA. No hematoma. Ambulatory and taking oral.      Visit Vitals    /59 (BP 1 Location: Right arm, BP Patient Position: At rest)    Pulse 60    Temp 97.6 °F (36.4 °C)    Resp 18    Ht 5' 9\" (1.753 m)    Wt 99.8 kg (220 lb)    SpO2 95%    Breastfeeding No    BMI 32.49 kg/m2       ND, NAD.  RRR, no rub. Lungs CTAB. Bilateral groin sites OK. Awake, appropriate, neuro grossly nonfocal.    Tele:  A pacing. PLAN:  Discharge to home with F/U in the office in 2 weeks. Carafate and Protonix x 1 month. All questions answered. Patient is aware of signs and sx warranting urgent med F/U or calling 911.

## 2017-01-20 NOTE — DISCHARGE INSTRUCTIONS
POST-EP STUDY AND ABLATION DISCHARGE INSTRUCTIONS:    You had an atrial fibrillation ablation using radiofrequency energy on 1/19 with Dr. Yazmin Bolanos. Do not drive, operate any machinery, or sign any legal documents for 24 hours after your procedure. You must have someone to drive you home. You may take a shower 24 hours after your cardiac procedure. Be sure to get the dressing wet and then remove it; gently wash the area with warm soapy water. Pat dry and leave open to air. To help prevent infections, be sure to keep the cath site clean and dry. No lotions, creams, powders, ointments, etc. in the cath site for approximately 1 week.  Do not take a tub bath, get in a hot tub or swimming pool for approximately 5 days or until the cath site is completely healed.  No strenuous activity or heavy lifting over 20 lbs. for 7 days.  After your procedure, some bruising or discomfort is common during the healing process. Tylenol, 1-2 tablets every 6 hours as needed, is recommended if you experience any discomfort. If you experience any signs or symptoms of infection such as fever, chills, or poorly healing incision, persistent tenderness or swelling in the groin, redness and/or warmth to the touch, numbness, significant tingling or pain at the groin site or affected extremity, rash, drainage from the site, or if the leg feels tight or swollen, call your physician right away.  If bleeding at the site occurs, take a clean gauze pad and apply direct pressure to the groin just above the puncture site, and call your physician right away. If your procedure involved ablation therapy, you may feel some mild or vague chest discomfort due to delivery of heat therapy to the heart muscle. This should resolve in 1-2 days. If it gets worse or is associated with shortness of breath, dizziness, loss of consciousness, call your physician right away or call 911 if emergency medical care is needed.

## 2017-01-20 NOTE — PROGRESS NOTES
Discharge instructions given and gone over with pt. All questions answered. PIV and tele removed. Pts bilateral groin sites dry and intact with no signs of hematoma. Pt left with Rx's and with all belongings.

## 2017-01-20 NOTE — CARDIO/PULMONARY
C/P REHAB:  Chart reviewed. S/P ANGELIOT and ablation for atrial fibrillation. History significant for A FIB, systolic CHF, HTN, DM, neuropathy, depression/anxiety, IBS, and idiopathic peripheral neuropathy. LVEF 50% on ANGELITO 1/19/17. (Had been 20% in 2014.)  Nonsmoker. Met with patient and her . Printed material given and discussed regarding cardiac ablation, post ablation instructions, healthy heart/low salt diet, and atrial fibrillation. Discussed post ablation restrictions (avoiding heavy lifting and keeping the site clean and dry), what to do if bleeding/bruising is noted at the insertion site and when to call the doctor. Also gave and reviewed handouts on atrial fibrillation including etiology, symptomatology, and treatment. Advised of risks if left untreated. Notified of importance of taking medications as prescribed. Advised to avoid caffeine, nicotine and other stimulants. Described and encouraged a progressive walking program after recovery and with MD approval.  Discussed diagnosis of heart failure and reviewed the importance of low sodium diet, medication compliance and MD follow up. Pt has been trying to follow low sodium diet. .  Reviewed reading food labels for sodium content and use of salt substitute such as Mrs. Salinas. Discussed high sodium foods to avoid. Pt had no questions and indicated understanding.

## 2017-09-08 ENCOUNTER — HOSPITAL ENCOUNTER (EMERGENCY)
Age: 67
Discharge: HOME | End: 2017-09-08
Attending: EMERGENCY MEDICINE
Payer: MEDICARE

## 2017-09-08 VITALS
BODY MASS INDEX: 37.65 KG/M2 | SYSTOLIC BLOOD PRESSURE: 171 MMHG | DIASTOLIC BLOOD PRESSURE: 93 MMHG | OXYGEN SATURATION: 97 % | WEIGHT: 254.19 LBS | HEART RATE: 71 BPM | TEMPERATURE: 98.2 F | RESPIRATION RATE: 18 BRPM | HEIGHT: 69 IN

## 2017-09-08 DIAGNOSIS — G62.9 NEUROPATHY: Primary | ICD-10-CM

## 2017-09-08 DIAGNOSIS — M48.061 SPINAL STENOSIS OF LUMBAR REGION: ICD-10-CM

## 2017-09-08 PROCEDURE — 96372 THER/PROPH/DIAG INJ SC/IM: CPT

## 2017-09-08 PROCEDURE — 74011250636 HC RX REV CODE- 250/636: Performed by: EMERGENCY MEDICINE

## 2017-09-08 PROCEDURE — 99282 EMERGENCY DEPT VISIT SF MDM: CPT

## 2017-09-08 RX ORDER — MORPHINE SULFATE 2 MG/ML
4 INJECTION, SOLUTION INTRAMUSCULAR; INTRAVENOUS
Status: COMPLETED | OUTPATIENT
Start: 2017-09-08 | End: 2017-09-08

## 2017-09-08 RX ORDER — LIDOCAINE 50 MG/G
PATCH TOPICAL
Qty: 4 EACH | Refills: 0 | Status: SHIPPED | OUTPATIENT
Start: 2017-09-08 | End: 2018-01-22 | Stop reason: ALTCHOICE

## 2017-09-08 RX ADMIN — MORPHINE SULFATE 4 MG: 2 INJECTION, SOLUTION INTRAMUSCULAR; INTRAVENOUS at 03:52

## 2017-09-08 NOTE — ED NOTES
DC instructions and education provided by Dr. Shade Diez. Pt. Tami charlton. Pt stable at DC. Pt. Wheeled out by tech.

## 2017-09-08 NOTE — ED PROVIDER NOTES
HPI Comments: Eddie Nobles is a 61 y.o. female with history significant for HTN, asthma, arthritis, and DM presenting ambulatory to ED [facility] ED with c/o sudden onset of increased pain to the right foot around 0005 tonight. Pt rates her current pain as a 10/10 intensity on a pain scale with an associated burning, shooting, throbbing sensation to the right lateral foot. Pt states nothing makes her pain better or worse. Per pt, she has a history of neuropathy and has had foot complications such as these in the past. Pt notes normally her medications are able to assist in controlling her discomfort but notes tonight they did not work to provide any relief. Pt states in the past when her pain has been exacerbate, Lidoderm patches have worked to alleviate the discomfort. Of note, pt reports she was recently at the beach for five days where she walked quite a bit. Pt states neurologist works with her to adjust her medications; she denies any recent medications changes. Pt also denies any recent trauma or injury, bowel or bladder incontinence, numbness to the genitals, or leg weakness. Pt additionally specifically denies any nausea, vomiting, fevers, chills, abdominal pain, chest pain, or SOB. PCP: Carlos A Dias MD    PMHx: GERD, spinal stenosis   PSHx: appendectomy, hysterectomy   Social Hx: - EtOH; - Smoker; - Illicit Drugs    There are no other changes, complaints or physical findings at this time. Written by Fabrizio Garcia ED Scribe, as dictated by Julienne Denise MD.     The history is provided by the patient.       Past Medical History:   Diagnosis Date    Arthritis     OA    Asthma     Diabetes ([address]     Essential hypertension     GERD (gastroesophageal reflux disease)     Hypertension     Neuropathy (HCC)     Other ill-defined conditions(799.89)     IBS, spinal stenosis    Plantar fasciitis     Psychiatric disorder     depression, anxiety     Past Surgical History:   Procedure Laterality Date    HX APPENDECTOMY      HX HYSTERECTOMY      HX PACEMAKER       Family History:   Problem Relation Age of Onset   [address] Arthritis-osteo Mother     Hypertension Mother     High Cholesterol Mother     Crohn's Disease Mother     Heart Disease Mother     Alcohol abuse Father     High Cholesterol Sister     Hypertension Sister     Thyroid Disease Sister     COPD Sister     High Cholesterol Brother     Hypertension Brother     COPD Brother     COPD Child     Inflammatory Bowel Dz Child      Social History     Social History    Marital status:      Spouse name: N/A    Number of children: N/A    Years of education: N/A     Occupational History    Not on file. Social History Main Topics    Smoking status: Never Smoker    Smokeless tobacco: Not on file    Alcohol use No    Drug use: No    Sexual activity: Not on file     Other Topics Concern    Not on file     Social History Narrative     ALLERGIES: Sulfa (sulfonamide antibiotics) and Amoxicillin    Review of Systems   Constitutional: Negative for activity change, appetite change, fatigue and fever. HENT: Negative. Negative for congestion, rhinorrhea and sore throat. Respiratory: Negative. Negative for cough, shortness of breath and wheezing. Cardiovascular: Negative. Negative for chest pain and leg swelling. Gastrointestinal: Negative. Negative for abdominal distention, abdominal pain, constipation, diarrhea, nausea and vomiting. Endocrine: Negative. Genitourinary: Negative for difficulty urinating, dysuria, menstrual problem, vaginal bleeding and vaginal discharge. Musculoskeletal: Positive for arthralgias (R foot) and myalgias (R foot). Negative for joint swelling. Skin: Negative. Negative for rash. Neurological: Negative. Negative for dizziness, weakness, light-headedness and headaches. Psychiatric/Behavioral: Negative.       Vitals:    09/08/17 0241   BP: (!) 171/93   Pulse: 71   Resp: 18   Temp: 98.2 °F (36.8 °C) SpO2: 97%   Weight: 115.3 kg (254 lb 3.1 oz)   Height: 5' 9\" (1.753 m)          Physical Exam   Constitutional: She is oriented to person, place, and time. She appears well-developed and well-nourished. HENT:   Head: Atraumatic. Eyes: EOM are normal.   Cardiovascular: Normal rate, regular rhythm, normal heart sounds and intact distal pulses. Exam reveals no gallop and no friction rub. No murmur heard. Pulmonary/Chest: Effort normal and breath sounds normal. No respiratory distress. She has no wheezes. She has no rales. She exhibits no tenderness. Abdominal: Soft. Bowel sounds are normal. She exhibits no distension and no mass. There is no tenderness. There is no rebound and no guarding. Musculoskeletal: Normal range of motion. She exhibits no edema or tenderness. No calf swelling or tenderness; difficult to reproduce pain; negative straight leg raise   Neurological: She is oriented to person, place, and time. Strength equal   Skin: Skin is warm. Psychiatric: She has a normal mood and affect. Nursing note and vitals reviewed. MDM  Number of Diagnoses or Management Options  Diagnosis management comments: DDx: neuropathic pain due to known DM, paraesthesia, spinal stenosis, radiculopathy, herniated disc without evidence of compression       Amount and/or Complexity of Data Reviewed  Review and summarize past medical records: yes    Patient Progress  Patient progress: stable    ED Course     Procedures     Progress Note:   4:39 AM  Pt's pain was well controlled at the time of d/c. Written by Nuvia Carey ED Scribe, as dictated by Glen Rubin MD.    LABORATORY TESTS:  No results found for this or any previous visit (from the past 12 hour(s)). IMAGING RESULTS:  No orders to display     MEDICATIONS GIVEN:  Medications   morphine injection 4 mg (4 mg IntraMUSCular Given 9/8/17 0352)     IMPRESSION:  1. Neuropathy ([address]    2. Spinal stenosis of lumbar region        PLAN:  1.    Current Discharge Medication List      START taking these medications    Details   lidocaine (LIDODERM) 5 % Apply patch to the affected area for 12 hours a day and remove for 12 hours a day. Qty: 4 Each, Refills: 0           2. Follow-up Information     Follow up With Details Comments Contact Benjamin Monae MD Call today as needed for onoing pain management [address] [facility]  [phone]      Naun Sosa MD Call today to discuss possible adjustments to your current medications to better control your pain. [address] [facility]  [phone]      [facility] EMERGENCY DEPT  If symptoms worsen [address] [address]  [phone]        Return to ED if worse     DISCHARGE NOTE:    4:37 AM  The patient is ready for discharge. The patient signs, symptoms, diagnosis, and discharge instructions have been discussed and the patient has conveyed their understanding. The patient is to follow-up as reccommended or returned to the ER should their symptoms worsen. Plan has been discussed and patient is in agreement. This note is prepared by Cathleen Coe acting as Scribe for Alpesh Gibson MD.    Alpesh Gibson MD: This scribe's documentation has been prepared under my direction and personally reviewed by me in its entirety. I confirm that the note above accurately reflects all work, treatment procedures and medical decision makings performed by me.

## 2017-09-08 NOTE — DISCHARGE INSTRUCTIONS
Lumbar Spinal Stenosis: Care Instructions  Your Care Instructions    Stenosis in the spine is a narrowing of the canal that is around the spinal cord and nerve roots in your back. It can happen as part of aging. Sometimes bone and other tissue grow into this canal and press on the nerves that branch out from the spinal cord. This can cause pain, numbness, and weakness. When it happens in the lower part of your back, it is called lumbar spinal stenosis. It can cause problems in the legs, feet, and rear end (buttocks). You may be able to get relief from the symptoms of spinal stenosis by taking pain medicine. Your doctor may suggest physical therapy and exercises to keep your spine strong and flexible. Some people try steroid shots to reduce swelling. If pain and numbness in your legs are still so bad that you cannot do your normal activities, you may need surgery. Follow-up care is a key part of your treatment and safety. Be sure to make and go to all appointments, and call your doctor if you are having problems. It's also a good idea to know your test results and keep a list of the medicines you take. How can you care for yourself at home? · Take an over-the-counter pain medicine, such as acetaminophen (Tylenol), ibuprofen (Advil, Motrin), or naproxen (Aleve). Be safe with medicines. Read and follow all instructions on the label. · Do not take two or more pain medicines at the same time unless the doctor told you to. Many pain medicines have acetaminophen, which is Tylenol. Too much acetaminophen (Tylenol) can be harmful. · Stay at a healthy weight. Being overweight puts extra strain on your spine. · Change positions often when you sit or stand. This can ease pain. It may also reduce pressure on the spinal cord and its nerves. · Avoid doing things that make your symptoms worse. Walking downhill and standing for a long time may cause pain.   · Stretch and strengthen your back muscles as your doctor or physical therapist recommends. If your doctor says it is okay to do them, these exercises may help. ¨ Lie on your back with your knees bent. Gently pull one bent knee to your chest. Put that foot back on the floor, and then pull the other knee to your chest.  ¨ Do pelvic tilts. Lie on your back with your knees bent. Tighten your stomach muscles. Pull your belly button (navel) in and up toward your ribs. You should feel like your back is pressing to the floor and your hips and pelvis are slightly lifting off the floor. Hold for 6 seconds while breathing smoothly. ¨ Stand with your back flat against a wall. Slowly slide down until your knees are slightly bent. Hold for 10 seconds, then slide back up the wall. · Remove or change anything in your house that may cause you to fall. Keep walkways clear of clutter, electrical cords, and throw rugs. When should you call for help? Call 911 anytime you think you may need emergency care. For example, call if:  · You are unable to move a leg at all. Call your doctor now or seek immediate medical care if:  · You have new or worse symptoms in your legs, belly, or buttocks. Symptoms may include:  ¨ Numbness or tingling. ¨ Weakness. ¨ Pain. · You lose bladder or bowel control. Watch closely for changes in your health, and be sure to contact your doctor if:  · You are not getting better as expected. Where can you learn more? Go to http://dawood-vera.info/. [place] in the search box to learn more about \"Lumbar Spinal Stenosis: Care Instructions. \"  Current as of: March 21, 2017  Content Version: 11.3  © 5863-0738 [employer]. Care instructions adapted under license by [employer] (which disclaims liability or warranty for this information).  If you have questions about a medical condition or this instruction, always ask your healthcare professional. [address] any warranty or liability for your use of this information.

## 2017-09-14 RX ORDER — AMLODIPINE BESYLATE 5 MG/1
5 TABLET ORAL DAILY
Qty: 90 TAB | Refills: 3 | Status: SHIPPED | OUTPATIENT
Start: 2017-09-14 | End: 2018-04-27 | Stop reason: SDUPTHER

## 2017-09-14 NOTE — TELEPHONE ENCOUNTER
Last Refill: 7/10/17  Last visit:4/6/17    Requested Prescriptions     Pending Prescriptions Disp Refills    amLODIPine (NORVASC) 5 mg tablet 90 Tab 3     Sig: Take 1 Tab by mouth daily.

## 2017-10-09 ENCOUNTER — HOSPITAL ENCOUNTER (OUTPATIENT)
Dept: GENERAL RADIOLOGY | Age: 67
Discharge: HOME | End: 2017-10-09
Payer: MEDICARE

## 2017-10-09 DIAGNOSIS — N20.0 KIDNEY STONE: ICD-10-CM

## 2017-10-09 PROCEDURE — 74000 XR ABD (KUB): CPT

## 2017-12-19 RX ORDER — CLONAZEPAM 0.5 MG/1
0.5 TABLET ORAL
Qty: 30 TAB | Refills: 3 | Status: SHIPPED | OUTPATIENT
Start: 2017-12-19 | End: 2018-01-24 | Stop reason: SDUPTHER

## 2017-12-19 NOTE — TELEPHONE ENCOUNTER
Last visit:4/6/17      Requested Prescriptions     Pending Prescriptions Disp Refills    clonazePAM (KLONOPIN) 0.5 mg tablet 30 Tab 3     Sig: Take 1 Tab by mouth nightly.  Max Daily Amount: 0.5 mg.

## 2018-01-22 ENCOUNTER — OFFICE VISIT (OUTPATIENT)
Dept: INTERNAL MEDICINE CLINIC | Age: 68
End: 2018-01-22

## 2018-01-22 VITALS
SYSTOLIC BLOOD PRESSURE: 126 MMHG | BODY MASS INDEX: 34.16 KG/M2 | DIASTOLIC BLOOD PRESSURE: 78 MMHG | WEIGHT: 230.6 LBS | HEART RATE: 53 BPM | HEIGHT: 69 IN | TEMPERATURE: 98 F

## 2018-01-22 DIAGNOSIS — I10 ESSENTIAL HYPERTENSION: ICD-10-CM

## 2018-01-22 DIAGNOSIS — E11.40 CONTROLLED TYPE 2 DIABETES MELLITUS WITH DIABETIC NEUROPATHY, WITHOUT LONG-TERM CURRENT USE OF INSULIN (HCC): Primary | ICD-10-CM

## 2018-01-22 DIAGNOSIS — F32.A DEPRESSION, UNSPECIFIED DEPRESSION TYPE: ICD-10-CM

## 2018-01-22 DIAGNOSIS — Z79.899 LONG-TERM USE OF HIGH-RISK MEDICATION: ICD-10-CM

## 2018-01-22 DIAGNOSIS — E78.00 HYPERCHOLESTEROLEMIA: ICD-10-CM

## 2018-01-22 DIAGNOSIS — I48.20 CHRONIC ATRIAL FIBRILLATION (HCC): ICD-10-CM

## 2018-01-22 DIAGNOSIS — F41.9 ANXIETY: ICD-10-CM

## 2018-01-22 NOTE — PATIENT INSTRUCTIONS

## 2018-01-22 NOTE — PROGRESS NOTES
This note will not be viewable in [address] [place] Fleeting is a 74 y.o. female and presents with No chief complaint on file. .    Subjective:  Mrs. Ivan Krishnan returns to the office today in 1 year follow-up of multiple medical problems. The patient has type 2 diabetes mellitus complicated by diabetic neuropathy. The patient is presently on no medications for this. She does check her blood sugars once daily with average blood sugars between 110 and 120. She has had a diabetic retinal eye exam done within the last year. She does use Lidoderm and Lyrica for her lower extremity pain. She has had a previous diabetic ulcer and is had no recurrences. She has hypertension and remains on lisinopril Bumex and amlodipine. She denies any dizziness or lower extremity edema she has had no headaches, numbness, tingling or focal neurological problems. She has hypercholesterolemia and remains on statin therapy. She denies muscle soreness or GI upset. She denies any history of ASCVD and denies exertional chest pains. Patient does have a history of chronic systolic congestive heart failure and is on diuretics and Coreg for this and also history of atrial fibrillation for which he is on Eliquis for stroke prevention. She has had no strokelike symptoms and denied any bleeding issues. She does have anxiety and depression. She uses clonazepam for her anxiety and Effexor for the depression. Her symptoms have been stable over the last year without exacerbation.     Past Medical History:   Diagnosis Date    Anxiety 1/22/2018    Arthritis     OA    Asthma     Diabetes ([address]     Essential hypertension     GERD (gastroesophageal reflux disease)     Hypercholesterolemia 1/22/2018    Hypertension     Long-term use of high-risk medication 1/22/2018    Neuropathy     Other ill-defined conditions(799.89)     IBS, spinal stenosis    Plantar fasciitis     Psychiatric disorder     depression, anxiety     Past Surgical History:   Procedure Laterality Date    HX APPENDECTOMY      HX HYSTERECTOMY      HX PACEMAKER       Allergies   Allergen Reactions    Sulfa (Sulfonamide Antibiotics) Swelling    Amoxicillin Swelling     Current Outpatient Prescriptions   Medication Sig Dispense Refill    clonazePAM (KLONOPIN) 0.5 mg tablet Take 1 Tab by mouth nightly. Max Daily Amount: 0.5 mg. 30 Tab 3    amLODIPine (NORVASC) 5 mg tablet Take 1 Tab by mouth daily. 90 Tab 3    pantoprazole (PROTONIX) 40 mg tablet Take 1 Tab by mouth daily. 30 Tab 0    potassium chloride (K-DUR, KLOR-CON) 20 mEq tablet Take 1 Tab by mouth daily. 7 Tab 0    LYRICA 100 mg capsule TAKE ONE CAPSULE BY MOUTH 3 TIMES A DAY 90 Cap 2    carvedilol (COREG) 25 mg tablet Take 25 mg by mouth two (2) times daily (with meals). Indications: HYPERTENSION      venlafaxine (EFFEXOR) 75 mg tablet TAKE 2 TABLETS TWICE A  Tab 3    apixaban (ELIQUIS) 5 mg tablet Take 1 Tab by mouth two (2) times a day. 60 Tab 11    bumetanide (BUMEX) 1 mg tablet Take 1 tablet by mouth in the morning on Tuesday and Thursday, 2 tablets on Monday, Wednesday, and Friday.  aspirin 81 mg chewable tablet Take 81 mg by mouth daily.  lisinopril (PRINIVIL, ZESTRIL) 20 mg tablet Take 1 Tab by mouth daily. 30 Tab 12    atorvastatin (LIPITOR) 40 mg tablet Take 1 Tab by mouth nightly. 30 Tab 12    fexofenadine (ALLEGRA) 180 mg tablet Take 180 mg by mouth daily as needed.  multivitamins-minerals-lutein (CENTRUM SILVER) Tab Take  by mouth.  Cholecalciferol, Vitamin D3, (VITAMIN D3) 1,000 unit cap Take  by mouth.        Social History     Social History    Marital status:      Spouse name: N/A    Number of children: N/A    Years of education: N/A     Social History Main Topics    Smoking status: Never Smoker    Smokeless tobacco: Never Used    Alcohol use No    Drug use: No    Sexual activity: Not Asked     Other Topics Concern    None     Social History Narrative Family History   Problem Relation Age of Onset   [address] Arthritis-osteo Mother     Hypertension Mother     High Cholesterol Mother     Crohn's Disease Mother     Heart Disease Mother     Alcohol abuse Father     High Cholesterol Sister     Hypertension Sister     Thyroid Disease Sister     COPD Sister     High Cholesterol Brother     Hypertension Brother     COPD Brother     COPD Child     Inflammatory Bowel Dz Child        Health Maintenance   Topic Date Due    Hepatitis C Screening  1950    FOOT EXAM Q1  09/13/1962    MICROALBUMIN Q1  09/13/1962    EYE EXAM RETINAL OR DILATED Q1  09/13/1962    DTaP/Tdap/Td series (1 - Tdap) 09/13/1973    BREAST CANCER SCRN MAMMOGRAM  09/13/2002    FOBT Q 1 YEAR AGE 50-75  09/13/2002    ZOSTER VACCINE AGE 60>  07/13/2012    LIPID PANEL Q1  04/03/2015    HEMOGLOBIN A1C Q6M  12/12/2016    GLAUCOMA SCREENING Q2Y  09/13/2017    OSTEOPOROSIS SCREENING (DEXA)  09/13/2017    Pneumococcal 65+ Low/Medium Risk (1 of 2 - PCV13) 09/13/2017    MEDICARE YEARLY EXAM  09/13/2017    Influenza Age 9 to Adult  Completed        Review of Systems  Constitutional: negative for fevers, chills, anorexia and weight loss  Eyes:   negative for visual disturbance and irritation  ENT:   negative for tinnitus,sore throat,nasal congestion,ear pain,hoarseness  Respiratory:  negative for cough, hemoptysis, dyspnea,wheezing  CV:   negative for chest pain, palpitations, lower extremity edema  GI:   negative for nausea, vomiting, diarrhea, abdominal pain,melena  Endo:               negative for polyuria,polydipsia,polyphagia,heat intolerance  Genitourinary: negative for frequency, dysuria and hematuria  Integumentary: negative for rash and pruritus  Hematologic:  negative for easy bruising and gum/nose bleeding  Musculoskel: negative for myalgias, arthralgias, back pain, muscle weakness, joint pain  Neurological:  negative for headaches, dizziness, vertigo, memory problems and gait Behavl/Psych: negative for feelings of anxiety, depression, mood changes  ROS otherwise negative      Objective:  Visit Vitals    /78    Pulse (!) 53    Temp 98 °F (36.7 °C)    Ht 5' 9\" (1.753 m)    Wt 230 lb 9.6 oz (104.6 kg)    BMI 34.05 kg/m2     Body mass index is 34.05 kg/(m^2). Physical Exam:   General appearance - alert, well appearing, and in no distress  Mental status - alert, oriented to person, place, and time  EYE-VIVEK, EOMI,conjunctiva normal bilaterally, lids normal  ENT-ENT exam normal, no neck nodes or sinus tenderness  Nose - normal and patent, no erythema,  Or discharge   Mouth - mucous membranes moist, pharynx normal without lesions  Neck - supple, no significant adenopathy or bruit  Chest - clear to auscultation, no wheezes, rales or rhonchi. Heart - normal rate, regular rhythm, normal S1, S2, no murmurs, rubs, clicks or gallops   Abdomen - soft, nontender, nondistended, no masses or organomegaly  Lymph- no adenopathy palpable  Ext-peripheral pulses normal, no pedal edema, no clubbing or cyanosis  Skin-Warm and dry. no hyperpigmentation, vitiligo, or suspicious lesions  Neuro -alert, oriented, normal speech, no focal findings or movement disorder noted      Assessment/Plan:  Diagnoses and all orders for this visit:    Controlled type 2 diabetes mellitus with diabetic neuropathy, without long-term current use of insulin (HCC)  -     AMB POC HEMOGLOBIN A1C  -     COLLECTION VENOUS BLOOD,VENIPUNCTURE  -     AMB POC URINE, MICROALBUMIN, SEMIQUANT (1 RESULT)    Essential hypertension  -     AMB POC COMPLETE CBC,AUTOMATED ENTER  -     AMB POC COMPREHENSIVE METABOLIC PANEL  -     AMB POC URINALYSIS DIP STICK AUTO W/ MICRO     Anxiety    Hypercholesterolemia  -     AMB POC LIPID PROFILE  -     AMB POC TSH    Long-term use of high-risk medication  -     AMB POC CK (CPK)    Depression, unspecified depression type    Chronic atrial fibrillation (HCC)        Other instructions:    The patient's medications are reviewed and reconciled. No change in her current medical regimen is made. Body mass index is 34.05 and dietary counseling along with printed patient education is given. Continue to check blood sugars once daily. Await results of multiple labs. Follow-up 6 months    Follow-up Disposition:  Return in about 6 months (around 7/22/2018). I have reviewed with the patient details of the assessment and plan and all questions were answered. Relevent patient education was performed. The most recent lab findings were reviewed with the patient. An After Visit Summary was printed and given to the patient.     Kimberlee Hager MD

## 2018-01-23 LAB
ALBUMIN SERPL-MCNC: 4.3 G/DL (ref 3.9–5.4)
ALKALINE PHOS POC: 97 U/L (ref 38–126)
ALT SERPL-CCNC: 22 U/L (ref 9–52)
AST SERPL-CCNC: 24 U/L (ref 14–36)
BACTERIA UA POCT, BACTPOCT: NORMAL
BILIRUB UR QL STRIP: NEGATIVE
BUN BLD-MCNC: 22 MG/DL (ref 7–17)
CALCIUM BLD-MCNC: 9.7 MG/DL (ref 8.4–10.2)
CASTS UA POCT: 0
CHLORIDE BLD-SCNC: 102 MMOL/L (ref 98–107)
CHOLEST SERPL-MCNC: 138 MG/DL (ref 0–200)
CK (CPK) POC: 63 U/L (ref 30–135)
CLUE CELLS, CLUEPOCT: NEGATIVE
CO2 POC: 33 MMOL/L (ref 22–32)
CREAT BLD-MCNC: 1.4 MG/DL (ref 0.7–1.2)
CRYSTALS UA POCT, CRYSPOCT: NEGATIVE
EGFR (POC): 39.3
EPITHELIAL CELLS POCT: NEGATIVE
GLUCOSE POC: 121 MG/DL (ref 65–105)
GLUCOSE UR-MCNC: NEGATIVE MG/DL
GRAN# POC: 5.2 K/UL (ref 2–7.8)
GRAN% POC: 65.2 % (ref 37–92)
HBA1C MFR BLD HPLC: 6.1 % (ref 4.5–5.7)
HCT VFR BLD CALC: 42.4 % (ref 37–51)
HDLC SERPL-MCNC: 40 MG/DL (ref 35–130)
HGB BLD-MCNC: 14.1 G/DL (ref 12–18)
KETONES P FAST UR STRIP-MCNC: NEGATIVE MG/DL
LDL CHOLESTEROL POC: 50.4 MG/DL (ref 0–130)
LY# POC: 2.5 K/UL (ref 0.6–4.1)
LY% POC: 31.6 % (ref 10–58.5)
MCH RBC QN: 31.5 PG (ref 26–32)
MCHC RBC-ENTMCNC: 33.3 G/DL (ref 30–36)
MCV RBC: 95 FL (ref 80–97)
MICROALBUMIN UR TEST STR-MCNC: NEGATIVE MG/L (ref 0–20)
MID #, POC: 0.2 K/UL (ref 0–1.8)
MID% POC: 3.2 % (ref 0.1–24)
MUCUS UA POCT, MUCPOCT: NORMAL
PH UR STRIP: 7 [PH] (ref 5–7)
PLATELET # BLD: 234 K/UL (ref 140–440)
POTASSIUM SERPL-SCNC: 4.1 MMOL/L (ref 3.6–5)
PROT SERPL-MCNC: 7.4 G/DL (ref 6.3–8.2)
PROT UR QL STRIP: NEGATIVE
RBC # BLD: 4.49 M/UL (ref 4.2–6.3)
RBC UA POCT, RBCPOCT: 0
SODIUM SERPL-SCNC: 148 MMOL/L (ref 137–145)
SP GR UR STRIP: 1.01 (ref 1.01–1.02)
TCHOL/HDL RATIO (POC): 3.5 (ref 0–4)
TOTAL BILIRUBIN POC: 1.3 MG/DL (ref 0.2–1.3)
TRICH UA POCT, TRICHPOC: NEGATIVE
TRIGL SERPL-MCNC: 238 MG/DL (ref 0–200)
TSH BLD-ACNC: 2.22 UIU/ML (ref 0.4–4.2)
UA UROBILINOGEN AMB POC: NORMAL (ref 0.2–1)
URINALYSIS CLARITY POC: CLEAR
URINALYSIS COLOR POC: NORMAL
URINE BLOOD POC: NEGATIVE
URINE CULT COMMENT, POCT: NORMAL
URINE LEUKOCYTES POC: NEGATIVE
URINE NITRITES POC: NEGATIVE
VLDLC SERPL CALC-MCNC: 47.6 MG/DL
WBC # BLD: 7.9 K/UL (ref 4.1–10.9)
WBC UA POCT, WBCPOCT: 0
YEAST UA POCT, YEASTPOC: NEGATIVE

## 2018-01-24 RX ORDER — CLONAZEPAM 0.5 MG/1
0.5 TABLET ORAL
Qty: 90 TAB | Refills: 1 | Status: SHIPPED | OUTPATIENT
Start: 2018-01-24 | End: 2018-06-22 | Stop reason: SDUPTHER

## 2018-02-12 ENCOUNTER — TELEPHONE (OUTPATIENT)
Dept: INTERNAL MEDICINE CLINIC | Age: 68
End: 2018-02-12

## 2018-02-12 DIAGNOSIS — E11.42 DIABETIC POLYNEUROPATHY ASSOCIATED WITH TYPE 2 DIABETES MELLITUS (HCC): ICD-10-CM

## 2018-02-12 DIAGNOSIS — I10 ESSENTIAL HYPERTENSION: ICD-10-CM

## 2018-02-12 DIAGNOSIS — R27.0 ATAXIA: ICD-10-CM

## 2018-02-12 DIAGNOSIS — G62.9 NEUROPATHY: ICD-10-CM

## 2018-02-12 DIAGNOSIS — G60.9 HEREDITARY AND IDIOPATHIC PERIPHERAL NEUROPATHY: ICD-10-CM

## 2018-02-12 DIAGNOSIS — G43.109 MIGRAINE WITH AURA AND WITHOUT STATUS MIGRAINOSUS, NOT INTRACTABLE: ICD-10-CM

## 2018-02-12 DIAGNOSIS — E53.8 B12 DEFICIENCY: ICD-10-CM

## 2018-02-12 DIAGNOSIS — E11.49 TYPE II OR UNSPECIFIED TYPE DIABETES MELLITUS WITH NEUROLOGICAL MANIFESTATIONS, NOT STATED AS UNCONTROLLED(250.60) (HCC): ICD-10-CM

## 2018-02-12 DIAGNOSIS — M48.061 SPINAL STENOSIS, LUMBAR REGION, WITHOUT NEUROGENIC CLAUDICATION: ICD-10-CM

## 2018-02-12 NOTE — TELEPHONE ENCOUNTER
Please send in new Rx for Bumex to [facility] (unable to [facility] up requiring change in diagnosis) 90 day supply with refills

## 2018-02-13 RX ORDER — BUMETANIDE 1 MG/1
1 TABLET ORAL DAILY
Qty: 100 TAB | Refills: 3 | Status: SHIPPED | OUTPATIENT
Start: 2018-02-13 | End: 2018-10-03 | Stop reason: SDUPTHER

## 2018-02-22 ENCOUNTER — OFFICE VISIT (OUTPATIENT)
Dept: INTERNAL MEDICINE CLINIC | Age: 68
End: 2018-02-22

## 2018-02-22 VITALS
DIASTOLIC BLOOD PRESSURE: 78 MMHG | BODY MASS INDEX: 38.95 KG/M2 | WEIGHT: 263 LBS | SYSTOLIC BLOOD PRESSURE: 130 MMHG | OXYGEN SATURATION: 97 % | HEIGHT: 69 IN | HEART RATE: 113 BPM

## 2018-02-22 DIAGNOSIS — M48.061 SPINAL STENOSIS, LUMBAR REGION, WITHOUT NEUROGENIC CLAUDICATION: Primary | ICD-10-CM

## 2018-02-22 DIAGNOSIS — E11.40 CONTROLLED TYPE 2 DIABETES MELLITUS WITH DIABETIC NEUROPATHY, WITHOUT LONG-TERM CURRENT USE OF INSULIN (HCC): ICD-10-CM

## 2018-02-22 RX ORDER — PREDNISONE 5 MG/1
TABLET ORAL
Qty: 15 TAB | Refills: 0 | Status: SHIPPED | OUTPATIENT
Start: 2018-02-22 | End: 2018-03-13 | Stop reason: CLARIF

## 2018-02-22 RX ORDER — PLANT STANOL ESTER 450 MG
TABLET ORAL
Status: ON HOLD | COMMUNITY
End: 2018-10-10

## 2018-02-22 RX ORDER — ZINC GLUCONATE 10 MG
250 LOZENGE ORAL DAILY
Status: ON HOLD | COMMUNITY
End: 2018-10-10

## 2018-02-22 RX ORDER — METOPROLOL SUCCINATE 50 MG/1
TABLET, EXTENDED RELEASE ORAL DAILY
COMMUNITY
End: 2018-09-10

## 2018-02-22 NOTE — PROGRESS NOTES
[place] Fleeting is a 74 y.o. female presenting for Back Pain  . 1. Have you been to the ER, urgent care clinic since your last visit? Hospitalized since your last visit? No    2. Have you seen or consulted any other health care providers outside of the [address] [place] [address] since your last visit? Include any pap smears or colon screening. No    No flowsheet data found. No flowsheet data found. No flowsheet data found. There are no discontinued medications.

## 2018-02-22 NOTE — PROGRESS NOTES
This note will not be viewable in [address] 74 y.o. female presents with complaints of LBP    Patient notes that her low back pain is been present for 2 weeks. Prior to this she had spent some time at her mother's house where she sleeps in a bed that has a hard mattress. Her symptoms began after this. She notes that the pain is localized in the mid lumbar region and radiates into her left leg. She does have a history of lumbar spinal stenosis with neurogenic claudication. The patient is on Eliquis and is unable to take anti-inflammatory medication and is been taking Tylenol. In addition she also has Lyrica available to take for a neuropathy. She has been compliant with taking this. She denies any lower extremity weakness bowel or bladder incontinence. Past Medical History:   Diagnosis Date    Anxiety 1/22/2018    Arthritis     OA    Asthma     Diabetes ([address]     Essential hypertension     GERD (gastroesophageal reflux disease)     Hypercholesterolemia 1/22/2018    Hypertension     Long-term use of high-risk medication 1/22/2018    Neuropathy     Other ill-defined conditions(799.89)     IBS, spinal stenosis    Plantar fasciitis     Psychiatric disorder     depression, anxiety     Past Surgical History:   Procedure Laterality Date    HX APPENDECTOMY      HX HYSTERECTOMY      HX PACEMAKER       Allergies   Allergen Reactions    Sulfa (Sulfonamide Antibiotics) Swelling    Amoxicillin Swelling     Current Outpatient Prescriptions   Medication Sig Dispense Refill    metoprolol succinate (TOPROL-XL) 50 mg XL tablet Take  by mouth daily.  potassium gluconate 550 mg (90 mg) tab Take  by mouth.  magnesium 250 mg tab Take  by mouth.  conjugated estrogens (PREMARIN) 0.625 mg/gram vaginal cream Insert 0.5 g into vagina daily.  calcium-cholecalciferol, d3, (CALCIUM 600 + D) 600-125 mg-unit tab Take  by mouth.       predniSONE (DELTASONE) 5 mg tablet Take 5 tablets day one, take 4 tablets day two, take 3 tablets day three, take 2 tablets day four, take 1 tablet day five. 15 Tab 0    bumetanide (BUMEX) 1 mg tablet Take 1 Tab by mouth daily. Take 1 tablet by mouth in the morning on Tuesday and Thursday, 2 tablets on Monday, Wednesday, and Friday. 100 Tab 3    clonazePAM (KLONOPIN) 0.5 mg tablet Take 1 Tab by mouth nightly. Max Daily Amount: 0.5 mg. 90 Tab 1    amLODIPine (NORVASC) 5 mg tablet Take 1 Tab by mouth daily. 90 Tab 3    LYRICA 100 mg capsule TAKE ONE CAPSULE BY MOUTH 3 TIMES A DAY 90 Cap 2    venlafaxine (EFFEXOR) 75 mg tablet TAKE 2 TABLETS TWICE A  Tab 3    apixaban (ELIQUIS) 5 mg tablet Take 1 Tab by mouth two (2) times a day. 60 Tab 11    aspirin 81 mg chewable tablet Take 81 mg by mouth daily.  lisinopril (PRINIVIL, ZESTRIL) 20 mg tablet Take 1 Tab by mouth daily. 30 Tab 12    atorvastatin (LIPITOR) 40 mg tablet Take 1 Tab by mouth nightly. 30 Tab 12    Cholecalciferol, Vitamin D3, (VITAMIN D3) 1,000 unit cap Take  by mouth.        Social History     Social History    Marital status:      Spouse name: N/A    Number of children: N/A    Years of education: N/A     Social History Main Topics    Smoking status: Never Smoker    Smokeless tobacco: Never Used    Alcohol use No    Drug use: No    Sexual activity: Not Asked     Other Topics Concern    None     Social History Narrative     Family History   Problem Relation Age of Onset   [facility] Arthritis-osteo Mother     Hypertension Mother     High Cholesterol Mother     Crohn's Disease Mother     Heart Disease Mother     Alcohol abuse Father     High Cholesterol Sister     Hypertension Sister     Thyroid Disease Sister     COPD Sister     High Cholesterol Brother     Hypertension Brother     COPD Brother     COPD Child     Inflammatory Bowel Dz Child        Review of Systems:  Gen: no fatigue, fever, chills,  Nose: no rhinorrhea, no sinus pain  Mouth: no oral lesions, no sore throat  Resp: no shortness of breath, no wheezing, no cough  CV: no chest pain, no orthopnea, no paroxysmal nocturnal dyspnea, no lower extremity edema, no palpitations  Abd: no nausea, no heartburn, no diarrhea, no constipation, no abdominal pain  Back: Positive for back pain without radiculopathy. Stiffness with ROM  Neuro: no headaches, no syncope or presyncopal episodes  Heme: no lymphadenopathy, no easy bruising or bleeding  ROS otherwise negative    Visit Vitals    /78 (BP 1 Location: Left arm, BP Patient Position: Sitting)    Pulse (!) 113    Ht 5' 9\" (1.753 m)    Wt 263 lb (119.3 kg)    SpO2 97%    BMI 38.84 kg/m2     Gen: alert, oriented, no acute distress  HEENT: Unremarkable  Neck: Supple without adenopathy  Resp: no increase work of breathing, lungs clear to ausculation bilaterally, no wheezing, rales or rhonchi  CV: RRR. No murmurs, rubs, or gallops. Abd: soft, not tender, not distended. No hepatosplenomegaly. Normal bowel sounds. Neuro: cranial nerves intact, normal strength and movement in all extremities, reflexes and sensation intact and symmetric. Skin: no lesion or rash  Extremities: no cyanosis or edema  Back: Lower  to palpation on affected side. Twisting ROM limited. SLR negative bilaterally. LE DTR's symmetric. Diagnoses and all orders for this visit:    Spinal stenosis, lumbar region, without neurogenic claudication  -     predniSONE (DELTASONE) 5 mg tablet; Take 5 tablets day one, take 4 tablets day two, take 3 tablets day three, take 2 tablets day four, take 1 tablet day five., Normal, Disp-15 Tab, R-0    Controlled type 2 diabetes mellitus with diabetic neuropathy, without long-term current use of insulin ([address]        Other instructions:   She most likely has a flareup of her spinal stenosis. The change in the mattress that she was using well while she was at her mother's house likely is the cause.   We will have her apply heat to the lower back and place her on a quick taper of prednisone. She is to continue her Lyrica and can also add Tylenol for discomfort. Should there be no improvement would consider a course of physical therapy    Follow-up Disposition:  Return if symptoms worsen or fail to improve.     Marta Mendoza MD

## 2018-02-22 NOTE — PATIENT INSTRUCTIONS
Back Pain: Care Instructions  Your Care Instructions    Back pain has many possible causes. It is often related to problems with muscles and ligaments of the back. It may also be related to problems with the nerves, discs, or bones of the back. Moving, lifting, standing, sitting, or sleeping in an awkward way can strain the back. Sometimes you don't notice the injury until later. Arthritis is another common cause of back pain. Although it may hurt a lot, back pain usually improves on its own within several weeks. Most people recover in 12 weeks or less. Using good home treatment and being careful not to stress your back can help you feel better sooner. Follow-up care is a key part of your treatment and safety. Be sure to make and go to all appointments, and call your doctor if you are having problems. It's also a good idea to know your test results and keep a list of the medicines you take. How can you care for yourself at home? · Sit or lie in positions that are most comfortable and reduce your pain. Try one of these positions when you lie down:  ¨ Lie on your back with your knees bent and supported by large pillows. ¨ Lie on the floor with your legs on the seat of a sofa or chair. Anjel Place on your side with your knees and hips bent and a pillow between your legs. ¨ Lie on your stomach if it does not make pain worse. · Do not sit up in bed, and avoid soft couches and twisted positions. Bed rest can help relieve pain at first, but it delays healing. Avoid bed rest after the first day of back pain. · Change positions every 30 minutes. If you must sit for long periods of time, take breaks from sitting. Get up and walk around, or lie in a comfortable position. · Try using a heating pad on a low or medium setting for 15 to 20 minutes every 2 or 3 hours. Try a warm shower in place of one session with the heating pad. · You can also try an ice pack for 10 to 15 minutes every 2 to 3 hours.  Put a thin cloth between the ice pack and your skin. · Take pain medicines exactly as directed. ¨ If the doctor gave you a prescription medicine for pain, take it as prescribed. ¨ If you are not taking a prescription pain medicine, ask your doctor if you can take an over-the-counter medicine. · Take short walks several times a day. You can start with 5 to 10 minutes, 3 or 4 times a day, and work up to longer walks. Walk on level surfaces and avoid hills and stairs until your back is better. · Return to work and other activities as soon as you can. Continued rest without activity is usually not good for your back. · To prevent future back pain, do exercises to stretch and strengthen your back and stomach. Learn how to use good posture, safe lifting techniques, and proper body mechanics. When should you call for help? Call your doctor now or seek immediate medical care if:  ? · You have new or worsening numbness in your legs. ? · You have new or worsening weakness in your legs. (This could make it hard to stand up.)   ? · You lose control of your bladder or bowels. ? Watch closely for changes in your health, and be sure to contact your doctor if:  ? · Your pain gets worse. ? · You are not getting better after 2 weeks. Where can you learn more? Go to http://dawood-vera.info/. Enter J018 in the search box to learn more about \"Back Pain: Care Instructions. \"  Current as of: March 21, 2017  Content Version: 11.4  © 0233-1080 "[employer] Care instructions adapted under license by [employer] (which disclaims liability or warranty for this information). If you have questions about a medical condition or this instruction, always ask your healthcare professional. [place] [address] any warranty or liability for your use of this information.

## 2018-02-22 NOTE — MR AVS SNAPSHOT
[address] [place] [address] [id number] [phone] Patient: Alysha No MRN: [id number] Visit Information Date & Time Provider Department Dept. Phone Encounter #  
 2/22/2018  2:20 PM Carlos Freeman MD [facility] [id number] Follow-up Instructions Return if symptoms worsen or fail to improve. Upcoming Health Maintenance Date Due Hepatitis C Screening 1950 FOOT EXAM Q1 9/13/1962 EYE EXAM RETINAL OR DILATED Q1 9/13/1962 DTaP/Tdap/Td series (1 - Tdap) 9/13/1973 BREAST CANCER SCRN MAMMOGRAM 9/13/2002 FOBT Q 1 YEAR AGE 50-75 9/13/2002 ZOSTER VACCINE AGE 60> 7/13/2012 GLAUCOMA SCREENING Q2Y 9/13/2017 OSTEOPOROSIS SCREENING (DEXA) 9/13/2017 Pneumococcal 65+ Low/Medium Risk (1 of 2 - PCV13) 9/13/2017 MEDICARE YEARLY EXAM 9/13/2017 HEMOGLOBIN A1C Q6M 7/22/2018 MICROALBUMIN Q1 1/22/2019 LIPID PANEL Q1 1/22/2019 Allergies as of 2/22/2018  Review Complete On: 2/22/2018 By: Carlos Freeman MD  
  
 Severity Noted Reaction Type Reactions Sulfa (Sulfonamide Antibiotics) High 05/14/2010   Side Effect Swelling Amoxicillin  07/17/2014    Swelling Current Immunizations  Reviewed on 5/14/2010 Name Date Influenza High Dose Vaccine PF 9/20/2017 12:00 AM  
 Influenza Vaccine 10/1/2016 Pneumococcal Polysaccharide (PPSV-23)  Deferred (Patient Refused) Not reviewed this visit You Were Diagnosed With   
  
 Codes Comments Spinal stenosis, lumbar region, without neurogenic claudication    -  Primary ICD-10-CM: M48.061 
ICD-9-CM: 724.02 Controlled type 2 diabetes mellitus with diabetic neuropathy, without long-term current use of insulin (HCC)     ICD-10-CM: E11.40 ICD-9-CM: 250.60, 357.2 Vitals BP Pulse Height(growth percentile) Weight(growth percentile) SpO2 BMI 130/78 (BP 1 Location: Left arm, BP Patient Position: Sitting) (!) 113 5' 9\" (1.753 m) 263 lb (119.3 kg) 97% 38.84 kg/m2 OB Status Smoking Status Hysterectomy Never Smoker BMI and BSA Data Body Mass Index Body Surface Area  
 38.84 kg/m 2 2.41 m 2 [email or web address] [phone] Your Updated Medication List  
  
   
This list is accurate as of 2/22/18  2:27 PM.  Always use your most recent med list. amLODIPine 5 mg tablet Commonly known as:  Arva Brazen Take 1 Tab by mouth daily. apixaban 5 mg tablet Commonly known as:  Ardean Robert Take 1 Tab by mouth two (2) times a day. aspirin 81 mg chewable tablet Take 81 mg by mouth daily. atorvastatin 40 mg tablet Commonly known as:  LIPITOR Take 1 Tab by mouth nightly. bumetanide 1 mg tablet Commonly known as:  Loretha Linda Take 1 Tab by mouth daily. Take 1 tablet by mouth in the morning on Tuesday and Thursday, 2 tablets on Monday, Wednesday, and Friday. CALCIUM 600 + D 600-125 mg-unit Tab Generic drug:  calcium-cholecalciferol (d3) Take  by mouth.  
  
 clonazePAM 0.5 mg tablet Commonly known as:  Elgie Ruse Take 1 Tab by mouth nightly. Max Daily Amount: 0.5 mg.  
  
 lisinopril 20 mg tablet Commonly known as:  Marlane Zulema Take 1 Tab by mouth daily. LYRICA 100 mg capsule Generic drug:  pregabalin TAKE ONE CAPSULE BY MOUTH 3 TIMES A DAY  
  
 magnesium 250 mg Tab Take  by mouth.  
  
 metoprolol succinate 50 mg XL tablet Commonly known as:  TOPROL-XL Take  by mouth daily. potassium gluconate 550 mg (90 mg) Tab Take  by mouth.  
  
 predniSONE 5 mg tablet Commonly known as:  Deborah Norman Take 5 tablets day one, take 4 tablets day two, take 3 tablets day three, take 2 tablets day four, take 1 tablet day five.   
  
 PREMARIN 0.625 mg/gram vaginal cream  
 Generic drug:  conjugated estrogens Insert 0.5 g into vagina daily. venlafaxine 75 mg tablet Commonly known as:  EFFEXOR  
TAKE 2 TABLETS TWICE A DAY  
  
 VITAMIN D3 1,000 unit Cap Generic drug:  cholecalciferol Take  by mouth. Prescriptions Sent to Pharmacy Refills  
 predniSONE (DELTASONE) 5 mg tablet 0 Sig: Take 5 tablets day one, take 4 tablets day two, take 3 tablets day three, take 2 tablets day four, take 1 tablet day five. Class: Normal  
 Pharmacy: [facility] DR DRE DELVALLE [address] [place] [address], [address] [place] [address] #: [phone] Follow-up Instructions Return if symptoms worsen or fail to improve. Introducing [facility]! Kirsten Foley introduces [employer] patient portal. Now you can access parts of your medical record, email your doctor's office, and request medication refills online. 1. In your internet browser, go to [email or web address] 2. Click on the First Time User? Click Here link in the Sign In box. You will see the New Member Sign Up page. 3. Enter your [employer] [place] [employer] Access Code exactly as it appears below. You will not need to use this code after youve completed the sign-up process. If you do not sign up before the expiration date, you must request a new code. · [employer] [place] [employer] Access Code: [email or web address] Expires: 5/23/2018  2:04 PM 
 
4. Enter the last four digits of your Social Security Number (xxxx) and Date of Birth (mm/dd/yyyy) as indicated and click Submit. You will be taken to the next sign-up page. 5. Create a [employer] ID. This will be your [employer] [place] [employer] login ID and cannot be changed, so think of one that is secure and easy to remember. 6. Create a [employer] [place] [employer] password. You can change your password at any time. 7. Enter your Password Reset Question and Answer. This can be used at a later time if you forget your password. 8. Enter your e-mail address.  You will receive e-mail notification when new information is available in [employer]. 9. Click Sign Up. You can now view and download portions of your medical record. 10. Click the Download Summary menu link to download a portable copy of your medical information. If you have questions, please visit the Frequently Asked Questions section of the [employer] website. Remember, [employer] is NOT to be used for urgent needs. For medical emergencies, dial 911. Now available from your iPhone and Android! Please provide this summary of care documentation to your next provider. Your primary care clinician is listed as MARIO Welch. If you have any questions after today's visit, please call [phone].

## 2018-03-14 ENCOUNTER — HOSPITAL ENCOUNTER (OUTPATIENT)
Age: 68
Setting detail: OUTPATIENT SURGERY
Discharge: HOME | End: 2018-03-14
Attending: SURGERY | Admitting: SURGERY
Payer: MEDICARE

## 2018-03-14 ENCOUNTER — ANESTHESIA EVENT (OUTPATIENT)
Dept: ENDOSCOPY | Age: 68
End: 2018-03-14
Payer: MEDICARE

## 2018-03-14 ENCOUNTER — ANESTHESIA (OUTPATIENT)
Dept: ENDOSCOPY | Age: 68
End: 2018-03-14
Payer: MEDICARE

## 2018-03-14 VITALS
TEMPERATURE: 97.8 F | WEIGHT: 260.19 LBS | DIASTOLIC BLOOD PRESSURE: 87 MMHG | OXYGEN SATURATION: 96 % | BODY MASS INDEX: 38.54 KG/M2 | RESPIRATION RATE: 16 BRPM | SYSTOLIC BLOOD PRESSURE: 149 MMHG | HEART RATE: 64 BPM | HEIGHT: 69 IN

## 2018-03-14 PROCEDURE — 74011250636 HC RX REV CODE- 250/636: Performed by: SURGERY

## 2018-03-14 PROCEDURE — 76040000019: Performed by: SURGERY

## 2018-03-14 PROCEDURE — 74011000250 HC RX REV CODE- 250

## 2018-03-14 PROCEDURE — 74011250636 HC RX REV CODE- 250/636

## 2018-03-14 PROCEDURE — 76060000031 HC ANESTHESIA FIRST 0.5 HR: Performed by: SURGERY

## 2018-03-14 RX ORDER — PROPOFOL 10 MG/ML
INJECTION, EMULSION INTRAVENOUS AS NEEDED
Status: DISCONTINUED | OUTPATIENT
Start: 2018-03-14 | End: 2018-03-14 | Stop reason: HOSPADM

## 2018-03-14 RX ORDER — SODIUM CHLORIDE 0.9 % (FLUSH) 0.9 %
5-10 SYRINGE (ML) INJECTION EVERY 8 HOURS
Status: DISCONTINUED | OUTPATIENT
Start: 2018-03-14 | End: 2018-03-14 | Stop reason: HOSPADM

## 2018-03-14 RX ORDER — LIDOCAINE HYDROCHLORIDE 20 MG/ML
INJECTION, SOLUTION EPIDURAL; INFILTRATION; INTRACAUDAL; PERINEURAL AS NEEDED
Status: DISCONTINUED | OUTPATIENT
Start: 2018-03-14 | End: 2018-03-14 | Stop reason: HOSPADM

## 2018-03-14 RX ORDER — SODIUM CHLORIDE 0.9 % (FLUSH) 0.9 %
5-10 SYRINGE (ML) INJECTION AS NEEDED
Status: DISCONTINUED | OUTPATIENT
Start: 2018-03-14 | End: 2018-03-14 | Stop reason: HOSPADM

## 2018-03-14 RX ORDER — NALOXONE HYDROCHLORIDE 0.4 MG/ML
0.4 INJECTION, SOLUTION INTRAMUSCULAR; INTRAVENOUS; SUBCUTANEOUS
Status: DISCONTINUED | OUTPATIENT
Start: 2018-03-14 | End: 2018-03-14 | Stop reason: HOSPADM

## 2018-03-14 RX ORDER — FENTANYL CITRATE 50 UG/ML
25 INJECTION, SOLUTION INTRAMUSCULAR; INTRAVENOUS
Status: DISCONTINUED | OUTPATIENT
Start: 2018-03-14 | End: 2018-03-14 | Stop reason: HOSPADM

## 2018-03-14 RX ORDER — ATROPINE SULFATE 0.1 MG/ML
0.5 INJECTION INTRAVENOUS
Status: DISCONTINUED | OUTPATIENT
Start: 2018-03-14 | End: 2018-03-14 | Stop reason: HOSPADM

## 2018-03-14 RX ORDER — FLUMAZENIL 0.1 MG/ML
0.2 INJECTION INTRAVENOUS
Status: DISCONTINUED | OUTPATIENT
Start: 2018-03-14 | End: 2018-03-14 | Stop reason: HOSPADM

## 2018-03-14 RX ORDER — EPINEPHRINE 0.1 MG/ML
1 INJECTION INTRACARDIAC; INTRAVENOUS
Status: DISCONTINUED | OUTPATIENT
Start: 2018-03-14 | End: 2018-03-14 | Stop reason: HOSPADM

## 2018-03-14 RX ORDER — SODIUM CHLORIDE 9 MG/ML
50 INJECTION, SOLUTION INTRAVENOUS CONTINUOUS
Status: DISCONTINUED | OUTPATIENT
Start: 2018-03-14 | End: 2018-03-14 | Stop reason: HOSPADM

## 2018-03-14 RX ORDER — MIDAZOLAM HYDROCHLORIDE 1 MG/ML
1-2 INJECTION, SOLUTION INTRAMUSCULAR; INTRAVENOUS
Status: DISCONTINUED | OUTPATIENT
Start: 2018-03-14 | End: 2018-03-14 | Stop reason: HOSPADM

## 2018-03-14 RX ORDER — DEXTROMETHORPHAN/PSEUDOEPHED 2.5-7.5/.8
1.2 DROPS ORAL
Status: DISCONTINUED | OUTPATIENT
Start: 2018-03-14 | End: 2018-03-14 | Stop reason: HOSPADM

## 2018-03-14 RX ADMIN — SODIUM CHLORIDE 50 ML/HR: 900 INJECTION, SOLUTION INTRAVENOUS at 13:25

## 2018-03-14 RX ADMIN — LIDOCAINE HYDROCHLORIDE 40 MG: 20 INJECTION, SOLUTION EPIDURAL; INFILTRATION; INTRACAUDAL; PERINEURAL at 14:00

## 2018-03-14 RX ADMIN — PROPOFOL 180 MG: 10 INJECTION, EMULSION INTRAVENOUS at 14:16

## 2018-03-14 NOTE — H&P
History and Physical    Patient: Avelino Phillips MRN: [id number]  SSN: [id number]    YOB: 1950  Age: 74 y.o. Sex: female      Subjective:      Avelino Phillips is a 74 y.o. female who presents for a colonoscopy. Past Medical History:   Diagnosis Date    Anxiety 1/22/2018    Arthritis     OA    Asthma     Diabetes ([address]     Essential hypertension     GERD (gastroesophageal reflux disease)     Hypercholesterolemia 1/22/2018    Hypertension     Long-term use of high-risk medication 1/22/2018    Neuropathy     Other ill-defined conditions(799.89)     IBS, spinal stenosis    Plantar fasciitis     Psychiatric disorder     depression, anxiety    Sleep apnea     uses CPAP     Past Surgical History:   Procedure Laterality Date    CARDIAC SURG PROCEDURE UNLIST      stent    HX APPENDECTOMY      HX HYSTERECTOMY      HX PACEMAKER        Family History   Problem Relation Age of Onset    Arthritis-osteo Mother     Hypertension Mother     High Cholesterol Mother     Crohn's Disease Mother     Heart Disease Mother     Alcohol abuse Father     High Cholesterol Sister     Hypertension Sister     Thyroid Disease Sister     COPD Sister     High Cholesterol Brother     Hypertension Brother     COPD Brother     COPD Child     Inflammatory Bowel Dz Child      Social History   Substance Use Topics    Smoking status: Never Smoker    Smokeless tobacco: Never Used    Alcohol use No      Prior to Admission medications    Medication Sig Start Date End Date Taking? Authorizing Provider   metoprolol succinate (TOPROL-XL) 50 mg XL tablet Take  by mouth daily. Yes Historical Provider   potassium gluconate 550 mg (90 mg) tab Take  by mouth. Yes Historical Provider   magnesium 250 mg tab Take  by mouth. Yes Historical Provider   conjugated estrogens (PREMARIN) 0.625 mg/gram vaginal cream Insert 0.5 g into vagina daily.    Yes Historical Provider   calcium-cholecalciferol, d3, (CALCIUM 600 + D) 600-125 mg-unit tab Take  by mouth. Yes Historical Provider   bumetanide (BUMEX) 1 mg tablet Take 1 Tab by mouth daily. Take 1 tablet by mouth in the morning on Tuesday and Thursday, 2 tablets on Monday, Wednesday, and Friday. 2/13/18  Yes Marta Mendoza MD   clonazePAM (KLONOPIN) 0.5 mg tablet Take 1 Tab by mouth nightly. Max Daily Amount: 0.5 mg. 1/24/18  Yes Marta Mendoza MD   amLODIPine (NORVASC) 5 mg tablet Take 1 Tab by mouth daily. 9/14/17  Yes Marta Mendoza MD   LYRICA 100 mg capsule TAKE ONE CAPSULE BY MOUTH 3 TIMES A DAY 12/19/16  Yes Fritz Ruiz NP   venlafaxine (EFFEXOR) 75 mg tablet TAKE 2 TABLETS TWICE A DAY 4/22/16  Yes Karin Murrell MD   apixaban (ELIQUIS) 5 mg tablet Take 1 Tab by mouth two (2) times a day. 4/20/16  Yes Radha Mccall MD   aspirin 81 mg chewable tablet Take 81 mg by mouth daily. Yes Historical Provider   lisinopril (PRINIVIL, ZESTRIL) 20 mg tablet Take 1 Tab by mouth daily. 4/9/14  Yes Nighat Santana MD   atorvastatin (LIPITOR) 40 mg tablet Take 1 Tab by mouth nightly. 4/9/14  Yes Nighat Santana MD   Cholecalciferol, Vitamin D3, (VITAMIN D3) 1,000 unit cap Take  by mouth. Yes Historical Provider        Allergies   Allergen Reactions    Sulfa (Sulfonamide Antibiotics) Swelling    Amoxicillin Swelling       Review of Systems:  A comprehensive review of systems was negative except for that written in the History of Present Illness. Objective:     Vitals:    03/14/18 1309 03/14/18 1320   BP:  143/81   Pulse:  70   Resp:  20   Temp:  97.8 °F (36.6 °C)   SpO2:  95%   Weight: 118 kg (260 lb 3 oz)    Height: 5' 9\" (1.753 m)         Physical Exam:  General:  Alert, cooperative, no distress, appears stated age. Eyes:  Conjunctivae/corneas clear. PERRL, EOMs intact. Fundi benign   Ears:  Normal TMs and external ear canals both ears. Nose: Nares normal. Septum midline. Mucosa normal. No drainage or sinus tenderness.    Mouth/Throat: Lips, mucosa, and tongue normal. Teeth and gums normal.   Neck: Supple, symmetrical, trachea midline, no adenopathy, thyroid: no enlargment/tenderness/nodules, no carotid bruit and no JVD. Back:   Symmetric, no curvature. ROM normal. No CVA tenderness. Lungs:   Clear to auscultation bilaterally. Heart:  Regular rate and rhythm, S1, S2 normal, no murmur, click, rub or gallop. Abdomen:   Soft, non-tender. Bowel sounds normal. No masses,  No organomegaly. Extremities: Extremities normal, atraumatic, no cyanosis or edema. Pulses: 2+ and symmetric all extremities. Skin: Skin color, texture, turgor normal. No rashes or lesions   Lymph nodes: Cervical, supraclavicular, and axillary nodes normal.   Neurologic: CNII-XII intact. Normal strength, sensation and reflexes throughout. Assessment:     Hospital Problems  Date Reviewed: 2/22/2018    None          Plan:     Colonoscopy.   All risks and benefits explained (bleeding, perforation, missed adenoma)    Signed By: Jamin Parada MD     March 14, 2018

## 2018-03-14 NOTE — ROUTINE PROCESS
Martin Wu  1950  [id number]    Situation:  Verbal report received from: Krissy Morris RN  Procedure: Procedure(s):  COLONOSCOPY    Background:    Preoperative diagnosis: SCREENING,RECTAL BLEEDING, CONSTIPATION  Postoperative diagnosis:   duverticulosis, hemorrhoids    :  Dr. Andrew Ventura   Assistant(s): Endoscopy Technician-1: Krysten Mcneal  Endoscopy RN-1: Vasquez Stoner    Specimens: * No specimens in log *  H. Pylori  no    Assessment:  Intra-procedure medications       Anesthesia gave intra-procedure sedation and medications, see anesthesia flow sheet yes    Intravenous fluids: NS@ KVO     Vital signs stable      Abdominal assessment: round and soft      Recommendation:  Discharge patient per MD order .      Family or Friend    Permission to share finding with family or friend yes

## 2018-03-14 NOTE — ANESTHESIA POSTPROCEDURE EVALUATION
Post-Anesthesia Evaluation and Assessment    Patient: Saud Thorpe MRN: [id number]  SSN: [id number]    YOB: 1950  Age: 74 y.o. Sex: female       Cardiovascular Function/Vital Signs  Visit Vitals    /87    Pulse 64    Temp 36.6 °C (97.8 °F)    Resp 16    Ht 5' 9\" (1.753 m)    Wt 118 kg (260 lb 3 oz)    SpO2 96%    BMI 38.42 kg/m2       Patient is status post total IV anesthesia anesthesia for Procedure(s):  COLONOSCOPY. Nausea/Vomiting: None    Postoperative hydration reviewed and adequate. Pain:  Pain Scale 1: Numeric (0 - 10) (03/14/18 1443)  Pain Intensity 1: 0 (03/14/18 1443)   Managed    Neurological Status: At baseline    Mental Status and Level of Consciousness: Arousable    Pulmonary Status:   O2 Device: Room air (03/14/18 1443)   Adequate oxygenation and airway patent    Complications related to anesthesia: None    Post-anesthesia assessment completed.  No concerns    Signed By: Kannan Santana MD     March 14, 2018

## 2018-03-14 NOTE — ANESTHESIA PREPROCEDURE EVALUATION
Anesthetic History   No history of anesthetic complications            Review of Systems / Medical History  Patient summary reviewed, nursing notes reviewed and pertinent labs reviewed    Pulmonary        Sleep apnea: CPAP    Asthma : well controlled       Neuro/Psych         Headaches and psychiatric history     Cardiovascular    Hypertension: well controlled        Dysrhythmias : atrial fibrillation  Pacemaker, past MI (2014), cardiac stents and hyperlipidemia    Exercise tolerance: <4 METS     GI/Hepatic/Renal     GERD           Endo/Other    Diabetes    Morbid obesity and arthritis     Other Findings   Comments: peripheral neuropathy           Physical Exam    Airway  Mallampati: III  TM Distance: 4 - 6 cm  Neck ROM: normal range of motion   Mouth opening: Normal     Cardiovascular  Regular rate and rhythm,  S1 and S2 normal,  no murmur, click, rub, or gallop             Dental  No notable dental hx       Pulmonary  Breath sounds clear to auscultation               Abdominal  GI exam deferred       Other Findings            Anesthetic Plan    ASA: 3  Anesthesia type: total IV anesthesia and MAC          Induction: Intravenous  Anesthetic plan and risks discussed with: Patient

## 2018-03-14 NOTE — DISCHARGE INSTRUCTIONS
Roberto Bautista  [id number]  1950    COLON DISCHARGE INSTRUCTIONS  Discomfort:  Redness at IV site- apply warm compress to area; if redness or soreness persist- contact your physician  There may be a slight amount of blood passed from the rectum  Gaseous discomfort- walking, belching will help relieve any discomfort  You may not operate a vehicle for 12 hours  You may not engage in an occupation involving machinery or appliances for rest of today  You may not drink alcoholic beverages for at least 12 hours  Avoid making any critical decisions for at least 24 hour  DIET:   High fiber diet. - however -  remember your colon is empty and a heavy meal will produce gas. Avoid these foods:  vegetables, fried / greasy foods, carbonated drinks for today       ACTIVITY:  You may resume your normal daily activities it is recommended that you spend the remainder of the day resting -  avoid any strenuous activity. CALL M.D. ANY SIGN OF:   Increasing pain, nausea, vomiting  Abdominal distension (swelling)  New increased bleeding (oral or rectal)  Fever (chills)  Follow-up Instructions:   Call Slick Butterfield MD if any questions or problems. Telephone # [phone]  Should have a repeat colonoscopy in 10 years. COLONOSCOPY FINDINGS:  Your colonoscopy showed: Enlarged hemorrhoids and diverticulosis. [employer] Activation    Thank you for requesting access to [employer]. Please follow the instructions below to securely access and download your online medical record. [employer] allows you to send messages to your doctor, view your test results, renew your prescriptions, schedule appointments, and more. How Do I Sign Up? 1. In your internet browser, go to [email or web address]  2. Click on the First Time User? Click Here link in the Sign In box. You will be redirect to the New Member Sign Up page. 3. Enter your [employer] Access Code exactly as it appears below.  You will not need to use this code after youve completed the sign-up process. If you do not sign up before the expiration date, you must request a new code. [employer] Access Code: [email or web address]  Expires: 2018  3:04 PM (This is the date your [employer] access code will )    4. Enter the last four digits of your Social Security Number (xxxx) and Date of Birth (mm/dd/yyyy) as indicated and click Submit. You will be taken to the next sign-up page. 5. Create a [employer] ID. This will be your [employer] login ID and cannot be changed, so think of one that is secure and easy to remember. 6. Create a [employer] password. You can change your password at any time. 7. Enter your Password Reset Question and Answer. This can be used at a later time if you forget your password. 8. Enter your e-mail address. You will receive e-mail notification when new information is available in [address]. 9. Click Sign Up. You can now view and download portions of your medical record. 10. Click the Download Summary menu link to download a portable copy of your medical information. Additional Information    If you have questions, please visit the Frequently Asked Questions section of the [employer] website at [email or web address] Remember, [employer] is NOT to be used for urgent needs. For medical emergencies, dial 911.

## 2018-03-14 NOTE — PERIOP NOTES
Anesthesia reports 180 mg Propofol, 40 mg Lidocaine and 500 mL NS given during procedure. Received report from anesthesia staff on vital signs and status of patient.

## 2018-03-14 NOTE — PERIOP NOTES
Endoscope was pre-cleaned at the bedside immediately following procedure by [facility].

## 2018-03-14 NOTE — IP AVS SNAPSHOT
[address] [facility] 
[phone] Patient: Martin Wu MRN: [id number] EBU:1/95/8485 About your hospitalization You were admitted on:  March 14, 2018 You last received care in the:  [facility] ENDOSCOPY You were discharged on:  March 14, 2018 Why you were hospitalized Your primary diagnosis was:  Not on File Follow-up Information None Discharge Orders None A check padmini indicates which time of day the medication should be taken. My Medications ASK your doctor about these medications Instructions Each Dose to Equal  
 Morning Noon Evening Bedtime  
 amLODIPine 5 mg tablet Commonly known as:  Toy Robles Your last dose was: Your next dose is: Take 1 Tab by mouth daily. 5 mg  
    
   
   
   
  
 apixaban 5 mg tablet Commonly known as:  Abbey Richardson Your last dose was: Your next dose is: Take 1 Tab by mouth two (2) times a day. 5 mg  
    
   
   
   
  
 aspirin 81 mg chewable tablet Your last dose was: Your next dose is: Take 81 mg by mouth daily. 81 mg  
    
   
   
   
  
 atorvastatin 40 mg tablet Commonly known as:  LIPITOR Your last dose was: Your next dose is: Take 1 Tab by mouth nightly. 40 mg  
    
   
   
   
  
 bumetanide 1 mg tablet Commonly known as:  Lita Isabella Your last dose was: Your next dose is: Take 1 Tab by mouth daily. Take 1 tablet by mouth in the morning on Tuesday and Thursday, 2 tablets on Monday, Wednesday, and Friday. 1 mg CALCIUM 600 + D 600-125 mg-unit Tab Generic drug:  calcium-cholecalciferol (d3) Your last dose was: Your next dose is: Take  by mouth.  
     
   
   
   
  
 clonazePAM 0.5 mg tablet Commonly known as:  Aysha Can Your last dose was: Your next dose is: Take 1 Tab by mouth nightly. Max Daily Amount: 0.5 mg.  
 0.5 mg  
    
   
   
   
  
 lisinopril 20 mg tablet Commonly known as:  Darrell Menon Your last dose was: Your next dose is: Take 1 Tab by mouth daily. 20 mg  
    
   
   
   
  
 LYRICA 100 mg capsule Generic drug:  pregabalin Your last dose was: Your next dose is: TAKE ONE CAPSULE BY MOUTH 3 TIMES A DAY  
     
   
   
   
  
 magnesium 250 mg Tab Your last dose was: Your next dose is: Take  by mouth.  
     
   
   
   
  
 metoprolol succinate 50 mg XL tablet Commonly known as:  TOPROL-XL Your last dose was: Your next dose is: Take  by mouth daily. potassium gluconate 550 mg (90 mg) Tab Your last dose was: Your next dose is: Take  by mouth. PREMARIN 0.625 mg/gram vaginal cream  
Generic drug:  conjugated estrogens Your last dose was: Your next dose is: Insert 0.5 g into vagina daily. 0.5 g  
    
   
   
   
  
 venlafaxine 75 mg tablet Commonly known as:  [facility] Your last dose was: Your next dose is: TAKE 2 TABLETS TWICE A DAY  
     
   
   
   
  
 VITAMIN D3 1,000 unit Cap Generic drug:  cholecalciferol Your last dose was: Your next dose is: Take  by mouth. Discharge Instructions Saud Thorpe [id number] 1950 COLON DISCHARGE INSTRUCTIONS Discomfort: 
Redness at IV site- apply warm compress to area; if redness or soreness persist- contact your physician There may be a slight amount of blood passed from the rectum Gaseous discomfort- walking, belching will help relieve any discomfort You may not operate a vehicle for 12 hours You may not engage in an occupation involving machinery or appliances for rest of today You may not drink alcoholic beverages for at least 12 hours Avoid making any critical decisions for at least 24 hour DIET: 
 High fiber diet.  however -  remember your colon is empty and a heavy meal will produce gas. Avoid these foods:  vegetables, fried / greasy foods, carbonated drinks for today ACTIVITY: 
You may resume your normal daily activities it is recommended that you spend the remainder of the day resting -  avoid any strenuous activity. CALL M.D. ANY SIGN OF: Increasing pain, nausea, vomiting Abdominal distension (swelling) New increased bleeding (oral or rectal) Fever (chills) Follow-up Instructions: 
 Call Malissa King MD if any questions or problems. Telephone # [phone] Should have a repeat colonoscopy in 10 years. COLONOSCOPY FINDINGS: 
Your colonoscopy showed: Enlarged hemorrhoids and diverticulosis. Cl[employer]edia Activation Thank you for requesting access to Cl[employer]edia. Please follow the instructions below to securely access and download your online medical record. Clupedia allows you to send messages to your doctor, view your test results, renew your prescriptions, schedule appointments, and more. How Do I Sign Up? 1. In your internet browser, go to [email or web address] 
2. Click on the First Time User? Click Here link in the Sign In box. You will be redirect to the New Member Sign Up page. 3. Enter your Clupedia Access Code exactly as it appears below. You will not need to use this code after youve completed the sign-up process. If you do not sign up before the expiration date, you must request a new code. Cl[employer]edia Access Code: [email or web address] Expires: 2018  3:04 PM (This is the date your Clupedia access code will ) 4.  Enter the last four digits of your Social Security Number (xxxx) and Date of Birth (mm/dd/yyyy) as indicated and click Submit. You will be taken to the next sign-up page. 5. Create a [employer] ID. This will be your [employer] login ID and cannot be changed, so think of one that is secure and easy to remember. 6. Create a [employer] password. You can change your password at any time. 7. Enter your Password Reset Question and Answer. This can be used at a later time if you forget your password. 8. Enter your e-mail address. You will receive e-mail notification when new information is available in [address] 9. Click Sign Up. You can now view and download portions of your medical record. 10. Click the Download Summary menu link to download a portable copy of your medical information. Additional Information If you have questions, please visit the Frequently Asked Questions section of the [employer] website at [email or web address] Remember, [employer] is NOT to be used for urgent needs. For medical emergencies, dial 911. Introducing [facility]! Amanuel Gray introduces [employer] patient portal. Now you can access parts of your medical record, email your doctor's office, and request medication refills online. 1. In your internet browser, go to [email or web address] 2. Click on the First Time User? Click Here link in the Sign In box. You will see the New Member Sign Up page. 3. Enter your [employer] Access Code exactly as it appears below. You will not need to use this code after youve completed the sign-up process. If you do not sign up before the expiration date, you must request a new code. · [employer] Access Code: [email or web address] Expires: 5/23/2018  3:04 PM 
 
4. Enter the last four digits of your Social Security Number (xxxx) and Date of Birth (mm/dd/yyyy) as indicated and click Submit. You will be taken to the next sign-up page. 5. Create a [employer] ID.  This will be your [employer] login ID and cannot be changed, so think of one that is secure and easy to remember. 6. Create a [employer] password. You can change your password at any time. 7. Enter your Password Reset Question and Answer. This can be used at a later time if you forget your password. 8. Enter your e-mail address. You will receive e-mail notification when new information is available in [address] 9. Click Sign Up. You can now view and download portions of your medical record. 10. Click the Download Summary menu link to download a portable copy of your medical information. If you have questions, please visit the Frequently Asked Questions section of the [employer] website. Remember, [employer] is NOT to be used for urgent needs. For medical emergencies, dial 911. Now available from your iPhone and Android! Providers Seen During Your Hospitalization Provider Specialty Primary office phone Alberto Hale MD Colon and Rectal Surgery [phone] Your Primary Care Physician (PCP) Primary Care Physician Office Phone Office Fax [facility] [phone] You are allergic to the following Allergen Reactions Sulfa (Sulfonamide Antibiotics) Swelling Amoxicillin Swelling Recent Documentation Height Weight BMI OB Status Smoking Status 1.753 m 118 kg 38.42 kg/m2 Hysterectomy Never Smoker Emergency Contacts Name Discharge Info Relation Home Work Mobile Ranjith Brito CAREGIVER [3] Spouse [3] [phone] Patient Belongings The following personal items are in your possession at time of discharge: 
  Dental Appliances: None  Visual Aid: Glasses Please provide this summary of care documentation to your next provider. Signatures-by signing, you are acknowledging that this After Visit Summary has been reviewed with you and you have received a copy.   
  
 
  
    
    
 Patient Signature: ____________________________________________________________ Date:  ____________________________________________________________  
  
Nanetta Marble Provider Signature:  ____________________________________________________________ Date:  ____________________________________________________________

## 2018-03-14 NOTE — PROCEDURES
Colonoscopy Procedure Note    Indications: Routine screening, Rectal bleeding    Anesthesia/Sedation: MAC anesthesia Propofol    Pre-Procedure Exam:  Airway: clear   Heart: normal S1and S2    Lungs: clear bilateral  Abdomen: soft, nontender, bowel sounds present and normal in all quadrants   Mental Status: awake, alert, and oriented to person, place, and time      Procedure in Detail:  Informed consent was obtained for the procedure, including sedation. Risks of perforation, hemorrhage, adverse drug reaction, and aspiration were discussed. The patient was placed in the left lateral decubitus position. Based on the pre-procedure assessment, including review of the patient's medical history, medications, allergies, and review of systems, she had been deemed to be an appropriate candidate for moderate sedation; she was therefore sedated with the medications listed above. The patient was monitored continuously with ECG tracing, pulse oximetry, blood pressure monitoring, and direct observations. A rectal examination was performed. The [id number] was inserted into the rectum and advanced under direct vision to the cecum, which was identified by the ileocecal valve and appendiceal orifice. The quality of the colonic preparation was excellent. A careful inspection was made as the colonoscope was withdrawn, including a retroflexed view of the rectum; findings and interventions are described below. Appropriate photodocumentation was obtained. Findings:   ANUS: Anal exam reveals no masses or hemorrhoids, sphincter tone is normal.   RECTUM: Rectal exam reveals no masses. Mildly enlarged internal hemorrhoids   SIGMOID COLON: The mucosa is normal with good vascular pattern and without ulcers or polyps. Mild diverticulosos identified   DESCENDING COLON: The mucosa is normal with good vascular pattern and without ulcers, diverticula, and polyps.    SPLENIC FLEXURE: The splenic flexure is normal.   TRANSVERSE COLON: The mucosa is normal with good vascular pattern and without ulcers, diverticula, and polyps. HEPATIC FLEXURE: The hepatic flexure is normal.   ASCENDING COLON: The mucosa is normal with good vascular pattern and without ulcers, diverticula, and polyps. CECUM: The appendiceal orifice appears normal. The ileocecal valve appears normal.   TERMINAL ILEUM: The terminal ileum was not entered. Specimens: No specimens were collected. EBL: None    Complications: None; patient tolerated the procedure well. Attending Attestation: I performed the procedure. Recommendations:   - For colon cancer screening in this average-risk patient, colonoscopy may be repeated in 10 years.     Signed By: Roberth Wolff MD                        March 14, 2018

## 2018-04-27 RX ORDER — AMLODIPINE BESYLATE 5 MG/1
TABLET ORAL
Qty: 90 TAB | Refills: 3 | Status: SHIPPED | OUTPATIENT
Start: 2018-04-27 | End: 2018-10-22

## 2018-06-22 DIAGNOSIS — F41.9 ANXIETY: ICD-10-CM

## 2018-06-25 RX ORDER — CLONAZEPAM 0.5 MG/1
TABLET ORAL
Qty: 90 TAB | Refills: 1 | Status: ON HOLD | OUTPATIENT
Start: 2018-06-25 | End: 2018-10-22 | Stop reason: SDUPTHER

## 2018-08-01 ENCOUNTER — OFFICE VISIT (OUTPATIENT)
Dept: INTERNAL MEDICINE CLINIC | Age: 68
End: 2018-08-01

## 2018-08-01 VITALS
HEIGHT: 69 IN | OXYGEN SATURATION: 96 % | BODY MASS INDEX: 35.25 KG/M2 | SYSTOLIC BLOOD PRESSURE: 124 MMHG | WEIGHT: 238 LBS | HEART RATE: 51 BPM | DIASTOLIC BLOOD PRESSURE: 80 MMHG

## 2018-08-01 DIAGNOSIS — F41.9 ANXIETY: Chronic | ICD-10-CM

## 2018-08-01 DIAGNOSIS — N39.0 URINARY TRACT INFECTION WITHOUT HEMATURIA, SITE UNSPECIFIED: ICD-10-CM

## 2018-08-01 DIAGNOSIS — Z79.899 LONG-TERM USE OF HIGH-RISK MEDICATION: ICD-10-CM

## 2018-08-01 DIAGNOSIS — L97.429 DIABETIC ULCER OF LEFT MIDFOOT ASSOCIATED WITH TYPE 2 DIABETES MELLITUS, UNSPECIFIED ULCER STAGE (HCC): Chronic | ICD-10-CM

## 2018-08-01 DIAGNOSIS — E78.00 HYPERCHOLESTEROLEMIA: ICD-10-CM

## 2018-08-01 DIAGNOSIS — I48.20 CHRONIC ATRIAL FIBRILLATION (HCC): Chronic | ICD-10-CM

## 2018-08-01 DIAGNOSIS — I10 ESSENTIAL HYPERTENSION: ICD-10-CM

## 2018-08-01 DIAGNOSIS — E11.621 DIABETIC ULCER OF LEFT MIDFOOT ASSOCIATED WITH TYPE 2 DIABETES MELLITUS, UNSPECIFIED ULCER STAGE (HCC): Chronic | ICD-10-CM

## 2018-08-01 DIAGNOSIS — E11.40 TYPE 2 DIABETES MELLITUS WITH DIABETIC NEUROPATHY, WITHOUT LONG-TERM CURRENT USE OF INSULIN (HCC): Primary | Chronic | ICD-10-CM

## 2018-08-01 LAB
BACTERIA UA POCT, BACTPOCT: ABNORMAL
BILIRUB UR QL STRIP: NEGATIVE
CASTS UA POCT: ABNORMAL
CLUE CELLS, CLUEPOCT: NEGATIVE
CRYSTALS UA POCT, CRYSPOCT: NEGATIVE
EPITHELIAL CELLS POCT: ABNORMAL
GLUCOSE UR-MCNC: NEGATIVE MG/DL
GRAN# POC: 5.1 K/UL (ref 2–7.8)
GRAN% POC: 67.5 % (ref 37–92)
HCT VFR BLD CALC: 42.4 % (ref 37–51)
HGB BLD-MCNC: 13.9 G/DL (ref 12–18)
KETONES P FAST UR STRIP-MCNC: NEGATIVE MG/DL
LY# POC: 2.1 K/UL (ref 0.6–4.1)
LY% POC: 29.1 % (ref 10–58.5)
MCH RBC QN: 29.5 PG (ref 26–32)
MCHC RBC-ENTMCNC: 32.9 G/DL (ref 30–36)
MCV RBC: 90 FL (ref 80–97)
MICROALBUMIN UR TEST STR-MCNC: NEGATIVE MG/L (ref 0–20)
MID #, POC: 0.2 K/UL (ref 0–1.8)
MID% POC: 3.4 % (ref 0.1–24)
MUCUS UA POCT, MUCPOCT: ABNORMAL
PH UR STRIP: 7 [PH] (ref 5–7)
PLATELET # BLD: 199 K/UL (ref 140–440)
PROT UR QL STRIP: NEGATIVE
RBC # BLD: 4.73 M/UL (ref 4.2–6.3)
RBC UA POCT, RBCPOCT: ABNORMAL
SP GR UR STRIP: 1.01 (ref 1.01–1.02)
TRICH UA POCT, TRICHPOC: NEGATIVE
UA UROBILINOGEN AMB POC: NORMAL (ref 0.2–1)
URINALYSIS CLARITY POC: CLEAR
URINALYSIS COLOR POC: YELLOW
URINE BLOOD POC: NEGATIVE
URINE CULT COMMENT, POCT: ABNORMAL
URINE LEUKOCYTES POC: ABNORMAL
URINE NITRITES POC: NEGATIVE
WBC # BLD: 7.4 K/UL (ref 4.1–10.9)
WBC UA POCT, WBCPOCT: ABNORMAL
YEAST UA POCT, YEASTPOC: NEGATIVE

## 2018-08-01 NOTE — PROGRESS NOTES
Patient here for diabetic followup with foot exam and paperwork to be filled out. 1. Have you been to the ER, urgent care clinic since your last visit? Hospitalized since your last visit? No 
 
2. Have you seen or consulted any other health care providers outside of the [address] [place] [address] since your last visit? Include any pap smears or colon screening. Yes Saw Dr. Cameron Marie (podiatrist)

## 2018-08-01 NOTE — PROGRESS NOTES
This note will not be viewable in [address] Devan Ferrari is a 74 y.o. female and presents with Diabetes (followup for paperwork) and Peripheral Neuropathy (pain in her fingers) Reji Pedersen Subjective: 
Mrs. Karen Solo is a 51-year-old  female who presents to the office today in follow-up of multiple medical problems. The patient has type 2 diabetes mellitus which is been complicated by the development of diabetic neuropathy. In addition this is been complicated by the development of a diabetic foot ulcer that involved the left foot and developed under a callus along the ball of the foot. This was treated and healed and she has since not had any further problems with this. She notes that her neuropathy has worsened over time and she is now starting to feel it in her fingers as well as in her ankle region. The patient checks her blood sugars once daily and average blood sugar is between 110 and 120. She currently is not on any medication for her type 2 diabetes mellitus and has never taken insulin in the past.  She is followed by a neurologist for her neuropathy and a foot specialist for her calluses and previous diabetic foot ulcer. She is currently being managed with the neuropathy taking Lyrica, venlafaxine and clonazepam.  The patient has had diabetic shoes in the past and request new shoes for prevention. The patient has hypertension and remains on amlodipine, metoprolol, Bumex and lisinopril. The patient denies any dizziness, fatigue or palpitations, muscle cramping or lower extremity edema. She has had no headaches, numbness, tingling or focal neurological problems. She has hypercholesterolemia and remains on statin therapy. She tolerates this without muscle soreness or GI upset. She denies any chest pains or claudication. She has a history of atrial fibrillation with systolic congestive heart failure and is followed by cardiology. She does take Eliquis for stroke prevention.   She has had no bleeding problems related to its usage and no strokelike symptoms. Past Medical History:  
Diagnosis Date  Anxiety 1/22/2018  Arthritis OA  Asthma  Diabetes ([facility] [address]  Essential hypertension  GERD (gastroesophageal reflux disease)  Hypercholesterolemia 1/22/2018  Hypertension  Long-term use of high-risk medication 1/22/2018  Neuropathy  Other ill-defined conditions(799.89) IBS, spinal stenosis  Plantar fasciitis  Psychiatric disorder   
 depression, anxiety  Sleep apnea   
 uses CPAP  Type 2 diabetes mellitus with diabetic neuropathy, without long-term current use of insulin ([facility] [address] 6/5/2016 Past Surgical History:  
Procedure Laterality Date  CARDIAC SURG PROCEDURE UNLIST    
 stent  COLONOSCOPY N/A 3/14/2018 COLONOSCOPY performed by Gunjan Bartlett MD at [facility]. 78  HX HYSTERECTOMY  HX PACEMAKER Allergies Allergen Reactions  Sulfa (Sulfonamide Antibiotics) Swelling  Amoxicillin Swelling Current Outpatient Prescriptions Medication Sig Dispense Refill  clonazePAM (KLONOPIN) 0.5 mg tablet TAKE 1 TABLET EVERY NIGHT. MAX DAILY DOSE OF 0.5MG. 90 Tab 1  
 amLODIPine (NORVASC) 5 mg tablet TAKE 1 TABLET EVERY DAY 90 Tab 3  
 metoprolol succinate (TOPROL-XL) 50 mg XL tablet Take  by mouth daily.  potassium gluconate 550 mg (90 mg) tab Take  by mouth.  magnesium 250 mg tab Take  by mouth.  conjugated estrogens (PREMARIN) 0.625 mg/gram vaginal cream Insert 0.5 g into vagina daily.  calcium-cholecalciferol, d3, (CALCIUM 600 + D) 600-125 mg-unit tab Take  by mouth.  bumetanide (BUMEX) 1 mg tablet Take 1 Tab by mouth daily. Take 1 tablet by mouth in the morning on Tuesday and Thursday, 2 tablets on Monday, Wednesday, and Friday. (Patient taking differently: Take 1 mg by mouth daily.  Take 1 tablet by mouth in the morning on Sunday, Tuesday, Thursday and Saturday, 2 tablets on Monday, Wednesday, and Friday.) 100 Tab 3  
 LYRICA 100 mg capsule TAKE ONE CAPSULE BY MOUTH 3 TIMES A DAY (Patient taking differently: 200mg 2 tabs daily) 90 Cap 2  
 venlafaxine (EFFEXOR) 75 mg tablet TAKE 2 TABLETS TWICE A  Tab 3  
 apixaban (ELIQUIS) 5 mg tablet Take 1 Tab by mouth two (2) times a day. 60 Tab 11  
 aspirin 81 mg chewable tablet Take 81 mg by mouth daily.  lisinopril (PRINIVIL, ZESTRIL) 20 mg tablet Take 1 Tab by mouth daily. 30 Tab 12  
 atorvastatin (LIPITOR) 40 mg tablet Take 1 Tab by mouth nightly. 30 Tab 12  Cholecalciferol, Vitamin D3, (VITAMIN D3) 1,000 unit cap Take  by mouth. Social History Social History  Marital status:  Spouse name: N/A  
 Number of children: N/A  
 Years of education: N/A Social History Main Topics  Smoking status: Never Smoker  Smokeless tobacco: Never Used  Alcohol use No  
 Drug use: No  
 Sexual activity: Not Asked Other Topics Concern  None Social History Narrative Family History Problem Relation Age of Onset [facility] Arthritis-osteo Mother  Hypertension Mother  High Cholesterol Mother  Crohn's Disease Mother  Heart Disease Mother  Alcohol abuse Father  High Cholesterol Sister  Hypertension Sister  Thyroid Disease Sister  COPD Sister  High Cholesterol Brother  Hypertension Brother  COPD Brother  COPD Child  Inflammatory Bowel Dz Child Health Maintenance Topic Date Due  
 Hepatitis C Screening  1950  
 FOOT EXAM Q1  09/13/1962  
 EYE EXAM RETINAL OR DILATED Q1  09/13/1962  DTaP/Tdap/Td series (1 - Tdap) 09/13/1973  BREAST CANCER SCRN MAMMOGRAM  09/13/2002  FOBT Q 1 YEAR AGE 50-75  09/13/2002  ZOSTER VACCINE AGE 60>  07/13/2012  GLAUCOMA SCREENING Q2Y  09/13/2017  Bone Densitometry (Dexa) Screening  09/13/2017  Pneumococcal 65+ Low/Medium Risk (1 of 2 - PCV13) 09/13/2017  MEDICARE YEARLY EXAM 03/14/2018  HEMOGLOBIN A1C Q6M  07/22/2018  Influenza Age 5 to Adult  08/01/2018  MICROALBUMIN Q1  01/22/2019  LIPID PANEL Q1  01/22/2019 Review of Systems Constitutional: negative for fevers, chills, anorexia and weight loss Eyes:   negative for visual disturbance and irritation ENT:   negative for tinnitus,sore throat,nasal congestion,ear pain,hoarseness Respiratory:  negative for cough, hemoptysis, dyspnea,wheezing CV:   negative for chest pain, palpitations, lower extremity edema GI:   negative for nausea, vomiting, diarrhea, abdominal pain,melena Endo:               negative for polyuria,polydipsia,polyphagia,heat intolerance Genitourinary: negative for frequency, dysuria and hematuria Integumentary: negative for rash and pruritus Hematologic:  negative for easy bruising and gum/nose bleeding Musculoskel: negative for myalgias, arthralgias, back pain, muscle weakness, joint pain Neurological:  Positive for numbness and burning in her feet, ankles and tips of fingers. Behavl/Psych: negative for feelings of anxiety, depression, mood changes ROS otherwise negative Objective: 
Visit Vitals  /80  Pulse (!) 51  
 Ht 5' 9\" (1.753 m)  Wt 238 lb (108 kg)  SpO2 96%  BMI 35.15 kg/m2 Body mass index is 35.15 kg/(m^2). Physical Exam:  
General appearance - alert, well appearing, and in no distress Mental status - alert, oriented to person, place, and time EYE-VIVEK, EOMI,conjunctiva normal bilaterally, lids normal 
ENT-ENT exam normal, no neck nodes or sinus tenderness Nose - normal and patent, no erythema,  Or discharge Mouth - mucous membranes moist, pharynx normal without lesions Neck - supple, no significant adenopathy or bruit Chest - clear to auscultation, no wheezes, rales or rhonchi. Heart - normal rate, regular rhythm, normal S1, S2, no murmurs, rubs, clicks or gallops Abdomen - soft, nontender, nondistended, no masses or organomegaly Lymph- no adenopathy palpable Ext-peripheral pulses normal, no pedal edema, no clubbing or cyanosis Skin-Warm and dry. no hyperpigmentation, vitiligo, or suspicious lesions Neuro -alert, oriented, normal speech, no focal findings or movement disorder noted Diabetic foot exam:  
 
Left Foot: 
 Visual Exam: Hammertoes involving every toe of foot Pulse DP: 2+ (normal) Filament test: 0/6 Vibratory sensation: absent Right Foot: 
 Visual Exam: Hammertoes involving every toe left foot Pulse DP: 2+ (normal) Filament test: 0/6 Vibratory sensation: absent Assessment/Plan: 
Diagnoses and all orders for this visit: 
 
Type 2 diabetes mellitus with diabetic neuropathy, without long-term current use of insulin (HCC) 
-     COLLECTION VENOUS BLOOD,VENIPUNCTURE 
-     AMB POC URINE, MICROALBUMIN, SEMIQUANT (1 RESULT) 
-     HEMOGLOBIN A1C W/O EAG 
-     AMB SUPPLY ORDER Diabetic ulcer of left midfoot associated with type 2 diabetes mellitus, unspecified ulcer stage ([facility] [address] -     AMB SUPPLY ORDER Essential hypertension -     AMB POC COMPLETE CBC,AUTOMATED ENTER 
-     AMB POC URINALYSIS DIP STICK AUTO W/ MICRO  
-     METABOLIC PANEL, COMPREHENSIVE Hypercholesterolemia -     LIPID PANEL 
-     TSH 3RD GENERATION Long-term use of high-risk medication 
-     CK Anxiety Chronic atrial fibrillation (HCC) Other instructions: The patient's medications are reviewed and reconciled. No change in her current medical regimen is made. Body mass index is 35.15 and dietary counseling along with printed patient education is given Continue to check blood sugars once daily Order for diabetic shoes is given to the patient. The patient qualifies for her diabetic shoes on the basis of bilateral foot deformities, previous left foot diabetic ulceration and severe neuropathy Await results of multiple labs Follow-up 6 months Follow-up Disposition: 
Return in about 6 months (around 2/1/2019). I have reviewed with the patient details of the assessment and plan and all questions were answered. Relevent patient education was performed. The most recent lab findings were reviewed with the patient. An After Visit Summary was printed and given to the patient.  
 
Daren Ha MD

## 2018-08-01 NOTE — PATIENT INSTRUCTIONS
Diabetes Foot Health: Care Instructions Your Care Instructions When you have diabetes, your feet need extra care and attention. Diabetes can damage the nerve endings and blood vessels in your feet, making you less likely to notice when your feet are injured. Diabetes also limits your body's ability to fight infection and get blood to areas that need it. If you get a minor foot injury, it could become an ulcer or a serious infection. With good foot care, you can prevent most of these problems. Caring for your feet can be quick and easy. Most of the care can be done when you are bathing or getting ready for bed. Follow-up care is a key part of your treatment and safety. Be sure to make and go to all appointments, and call your doctor if you are having problems. It's also a good idea to know your test results and keep a list of the medicines you take. How can you care for yourself at home? · Keep your blood sugar close to normal by watching what and how much you eat, monitoring blood sugar, taking medicines if prescribed, and getting regular exercise. · Do not smoke. Smoking affects blood flow and can make foot problems worse. If you need help quitting, talk to your doctor about stop-smoking programs and medicines. These can increase your chances of quitting for good. · Eat a diet that is low in fats. High fat intake can cause fat to build up in your blood vessels and decrease blood flow. · Inspect your feet daily for blisters, cuts, cracks, or sores. If you cannot see well, use a mirror or have someone help you. · Take care of your feet: 
[facility] your feet every day. Use warm (not hot) water. Check the water temperature with your wrists or other part of your body, not your feet. ¨ Dry your feet well. Pat them dry. Do not rub the skin on your feet too hard. Dry well between your toes. If the skin on your feet stays moist, bacteria or a fungus can grow, which can lead to infection.  
¨ Keep your skin soft. Use moisturizing skin cream to keep the skin on your feet soft and prevent calluses and cracks. But do not put the cream between your toes, and stop using any cream that causes a rash. ¨ Clean underneath your toenails carefully. Do not use a sharp object to clean underneath your toenails. Use the blunt end of a nail file or other rounded tool. ¨ Trim and file your toenails straight across to prevent ingrown toenails. Use a nail clipper, not scissors. Use an emery board to smooth the edges. · Change socks daily. Socks without seams are best, because seams often rub the feet. You can find socks for people with diabetes from specialty catalogs. · Look inside your shoes every day for things like gravel or torn linings, which could cause blisters or sores. · Buy shoes that fit well: 
¨ Look for shoes that have plenty of space around the toes. This helps prevent bunions and blisters. ¨ Try on shoes while wearing the kind of socks you will usually wear with the shoes. ¨ Avoid plastic shoes. They may rub your feet and cause blisters. Good shoes should be made of materials that are flexible and breathable, such as leather or cloth. ¨ Break in new shoes slowly by wearing them for no more than an hour a day for several days. Take extra time to check your feet for red areas, blisters, or other problems after you wear new shoes. · Do not go barefoot. Do not wear sandals, and do not wear shoes with very thin soles. Thin soles are easy to puncture. They also do not protect your feet from hot pavement or cold weather. · Have your doctor check your feet during each visit. If you have a foot problem, see your doctor. Do not try to treat an early foot problem at home. Home remedies or treatments that you can buy without a prescription (such as corn removers) can be harmful. · Always get early treatment for foot problems. A minor irritation can lead to a major problem if not properly cared for early.  
When should you call for help? Call your doctor now or seek immediate medical care if: 
  · You have a foot sore, an ulcer or break in the skin that is not healing after 4 days, bleeding corns or calluses, or an ingrown toenail.  
  · You have blue or black areas, which can mean bruising or blood flow problems.  
  · You have peeling skin or tiny blisters between your toes or cracking or oozing of the skin.  
  · You have a fever for more than 24 hours and a foot sore.  
  · You have new numbness or tingling in your feet that does not go away after you move your feet or change positions.  
  · You have unexplained or unusual swelling of the foot or ankle.  
 Watch closely for changes in your health, and be sure to contact your doctor if: 
  · You cannot do proper foot care. Where can you learn more? Go to http://dawoodChumbakvera.info/. Enter A739 in the search box to learn more about \"Diabetes Foot Health: Care Instructions. \" Current as of: December 7, 2017 Content Version: 11.7 © 5178-6624 [employer]. Care instructions adapted under license by [employer] (which disclaims liability or warranty for this information). If you have questions about a medical condition or this instruction, always ask your healthcare professional. [address] any warranty or liability for your use of this information. Learning About Cutting Calories How do calories affect your weight? Food gives your body energy. Energy from the food you eat is measured in calories. This energy keeps your heart beating, your brain active, and your muscles working. Your body needs a certain number of calories each day. After your body uses the calories it needs, it stores extra calories as fat. To lose weight safely, you have to eat fewer calories while eating in a healthy way. How many calories do you need each day? The more active you are, the more calories you need.  When you are less active, you need fewer calories. How many calories you need each day also depends on several things, including your age and whether you are male or female. Here are some general guidelines for adults: 
· Less active women and older adults need 1,600 to 2,000 calories each day. · Active women and less active men need 2,000 to 2,400 calories each day. · Active men need 2,400 to 3,000 calories each day. How can you cut calories and eat healthy meals? Whole grains, vegetables and fruits, and dried beans are good lower-calorie foods. They give you lots of nutrients and fiber. And they fill you up. Sweets, energy drinks, and soda pop are high in calories. They give you few nutrients and no fiber. Try to limit soda pop, fruit juice, and energy drinks. Drink water instead. Some fats can be part of a healthy diet. But cutting back on fats from highly processed foods like fast foods and many snack foods is a good way to lower the calories in your diet. Also, use smaller amounts of fats like butter, margarine, salad dressing, and mayonnaise. Add fresh garlic, lemon, or herbs to your meals to add flavor without adding fat. Meats and dairy products can be a big source of hidden fats. Try to choose lean or low-fat versions of these products. Fat-free cookies, candies, chips, and frozen treats can still be high in sugar and calories. Some fat-free foods have more calories than regular ones. Eat fat-free treats in moderation, as you would other foods. If your favorite foods are high in fat, salt, sugar, or calories, limit how often you eat them. Eat smaller servings, or look for healthy substitutes. Fill up on fruits, vegetables, and whole grains. Eating at home · Use meat as a side dish instead of as the main part of your meal. 
· Try main dishes that use whole wheat pasta, brown rice, dried beans, or vegetables. · Find ways to cook with little or no fat, such as broiling, steaming, or grilling.  
· Use cooking spray instead of oil. If you use oil, use a monounsaturated oil, such as canola or olive oil. · Trim fat from meats before you cook them. · Drain off fat after you brown the meat or while you roast it. · Chill soups and stews after you cook them. Then skim the fat off the top after it hardens. Eating out · Order foods that are broiled or poached rather than fried or breaded. · Cut back on the amount of butter or margarine that you use on bread. · Order sauces, gravies, and salad dressings on the side, and use only a little. · When you order pasta, choose tomato sauce rather than cream sauce. · Ask for salsa with your baked potato instead of sour cream, butter, cheese, or beltran. · Order meals in a small size instead of upgrading to a large. · Share an entree, or take part of your food home to eat as another meal. 
· Share appetizers and desserts. Where can you learn more? Go to http://dawood-vera.info/. Enter 99 202087 in the search box to learn more about \"Learning About Cutting Calories. \" Current as of: May 12, 2017 Content Version: 11.7 © 4472-0016 [employer]. Care instructions adapted under license by [employer] (which disclaims liability or warranty for this information). If you have questions about a medical condition or this instruction, always ask your healthcare professional. [address] any warranty or liability for your use of this information.

## 2018-08-02 LAB
ALBUMIN SERPL-MCNC: 4.3 G/DL (ref 3.6–4.8)
ALBUMIN/GLOB SERPL: 1.7 {RATIO} (ref 1.2–2.2)
ALP SERPL-CCNC: 108 IU/L (ref 39–117)
ALT SERPL-CCNC: 23 IU/L (ref 0–32)
AST SERPL-CCNC: 25 IU/L (ref 0–40)
BACTERIA UR CULT: NO GROWTH
BILIRUB SERPL-MCNC: 0.6 MG/DL (ref 0–1.2)
BUN SERPL-MCNC: 31 MG/DL (ref 8–27)
BUN/CREAT SERPL: 20 (ref 12–28)
CALCIUM SERPL-MCNC: 9.2 MG/DL (ref 8.7–10.3)
CHLORIDE SERPL-SCNC: 104 MMOL/L (ref 96–106)
CHOLEST SERPL-MCNC: 104 MG/DL (ref 100–199)
CK SERPL-CCNC: 106 U/L (ref 24–173)
CO2 SERPL-SCNC: 25 MMOL/L (ref 20–29)
CREAT SERPL-MCNC: 1.56 MG/DL (ref 0.57–1)
GLOBULIN SER CALC-MCNC: 2.6 G/DL (ref 1.5–4.5)
GLUCOSE SERPL-MCNC: 92 MG/DL (ref 65–99)
HBA1C MFR BLD: 5 % (ref 4.8–5.6)
HDLC SERPL-MCNC: 33 MG/DL
LDLC SERPL CALC-MCNC: 49 MG/DL (ref 0–99)
POTASSIUM SERPL-SCNC: 3.1 MMOL/L (ref 3.5–5.2)
PROT SERPL-MCNC: 6.9 G/DL (ref 6–8.5)
SODIUM SERPL-SCNC: 148 MMOL/L (ref 134–144)
TRIGL SERPL-MCNC: 110 MG/DL (ref 0–149)
TSH SERPL DL<=0.005 MIU/L-ACNC: 2.98 UIU/ML (ref 0.45–4.5)
VLDLC SERPL CALC-MCNC: 22 MG/DL (ref 5–40)

## 2018-08-03 NOTE — PROGRESS NOTES
Labs stable. BS 92 and A1c down to 5.0 No change in cuirrent management Diabetic shoe form complete and can  papers

## 2018-08-07 ENCOUNTER — OFFICE VISIT (OUTPATIENT)
Dept: INTERNAL MEDICINE CLINIC | Age: 68
End: 2018-08-07

## 2018-08-07 VITALS
HEART RATE: 49 BPM | HEIGHT: 69 IN | BODY MASS INDEX: 34.51 KG/M2 | OXYGEN SATURATION: 94 % | SYSTOLIC BLOOD PRESSURE: 102 MMHG | DIASTOLIC BLOOD PRESSURE: 70 MMHG | TEMPERATURE: 97.7 F | WEIGHT: 233 LBS

## 2018-08-07 DIAGNOSIS — J20.9 ACUTE BRONCHITIS, UNSPECIFIED ORGANISM: Primary | ICD-10-CM

## 2018-08-07 RX ORDER — AZITHROMYCIN 250 MG/1
250 TABLET, FILM COATED ORAL SEE ADMIN INSTRUCTIONS
Qty: 6 TAB | Refills: 0 | Status: SHIPPED | OUTPATIENT
Start: 2018-08-07 | End: 2018-08-07 | Stop reason: SDUPTHER

## 2018-08-07 RX ORDER — AZITHROMYCIN 250 MG/1
250 TABLET, FILM COATED ORAL SEE ADMIN INSTRUCTIONS
Qty: 6 TAB | Refills: 0 | Status: SHIPPED | OUTPATIENT
Start: 2018-08-07 | End: 2018-10-06 | Stop reason: CLARIF

## 2018-08-07 NOTE — PATIENT INSTRUCTIONS
Bronchitis: Care Instructions  Your Care Instructions    Bronchitis is inflammation of the bronchial tubes, which carry air to the lungs. The tubes swell and produce mucus, or phlegm. The mucus and inflamed bronchial tubes make you cough. You may have trouble breathing. Most cases of bronchitis are caused by viruses like those that cause colds. Antibiotics usually do not help and they may be harmful. Bronchitis usually develops rapidly and lasts about 2 to 3 weeks in otherwise healthy people. Follow-up care is a key part of your treatment and safety. Be sure to make and go to all appointments, and call your doctor if you are having problems. It's also a good idea to know your test results and keep a list of the medicines you take. How can you care for yourself at home? · Take all medicines exactly as prescribed. Call your doctor if you think you are having a problem with your medicine. · Get some extra rest.  · Take an over-the-counter pain medicine, such as acetaminophen (Tylenol), ibuprofen (Advil, Motrin), or naproxen (Aleve) to reduce fever and relieve body aches. Read and follow all instructions on the label. · Do not take two or more pain medicines at the same time unless the doctor told you to. Many pain medicines have acetaminophen, which is Tylenol. Too much acetaminophen (Tylenol) can be harmful. · Take an over-the-counter cough medicine that contains dextromethorphan to help quiet a dry, hacking cough so that you can sleep. Avoid cough medicines that have more than one active ingredient. Read and follow all instructions on the label. · Breathe moist air from a humidifier, hot shower, or sink filled with hot water. The heat and moisture will thin mucus so you can cough it out. · Do not smoke. Smoking can make bronchitis worse. If you need help quitting, talk to your doctor about stop-smoking programs and medicines. These can increase your chances of quitting for good.   When should you call for help? Call 911 anytime you think you may need emergency care. For example, call if:    · You have severe trouble breathing.    Call your doctor now or seek immediate medical care if:    · You have new or worse trouble breathing.     · You cough up dark brown or bloody mucus (sputum).     · You have a new or higher fever.     · You have a new rash.    Watch closely for changes in your health, and be sure to contact your doctor if:    · You cough more deeply or more often, especially if you notice more mucus or a change in the color of your mucus.     · You are not getting better as expected. Where can you learn more? Go to http://dawood-vera.info/. Enter H333 in the search box to learn more about \"Bronchitis: Care Instructions. \"  Current as of: December 6, 2017  Content Version: 11.7  © 5567-9978 [employer]. Care instructions adapted under license by [employer] (which disclaims liability or warranty for this information). If you have questions about a medical condition or this instruction, always ask your healthcare professional. [address] any warranty or liability for your use of this information.

## 2018-08-07 NOTE — PROGRESS NOTES
This note will not be viewable in [address] Devan Ferrari is a 74 y.o. female and presents with Cough; Wheezing; and Cold Symptoms  . Subjective:  Mrs. Karen Solo presents to the office today with 3-1/2 days worth of cough, wheezing associated with feverishness and chilling. She has had minimal runny nose but no sore throat. She has had no neck stiffness or rash. There have been no GI symptoms. Past Medical History:   Diagnosis Date    Anxiety 1/22/2018    Arthritis     OA    Asthma     Diabetes ([facility] [address]     Essential hypertension     GERD (gastroesophageal reflux disease)     Hypercholesterolemia 1/22/2018    Hypertension     Long-term use of high-risk medication 1/22/2018    Neuropathy     Other ill-defined conditions(799.89)     IBS, spinal stenosis    Plantar fasciitis     Psychiatric disorder     depression, anxiety    Sleep apnea     uses CPAP    Type 2 diabetes mellitus with diabetic neuropathy, without long-term current use of insulin ([facility] [address] 6/5/2016     Past Surgical History:   Procedure Laterality Date    CARDIAC SURG PROCEDURE UNLIST      stent    COLONOSCOPY N/A 3/14/2018    COLONOSCOPY performed by Antonette Lal MD at [facility] ENDOSCOPY    HX APPENDECTOMY      HX HYSTERECTOMY      HX PACEMAKER       Allergies   Allergen Reactions    Sulfa (Sulfonamide Antibiotics) Swelling    Amoxicillin Swelling     Current Outpatient Prescriptions   Medication Sig Dispense Refill    azithromycin (ZITHROMAX) 250 mg tablet Take 1 Tab by mouth See Admin Instructions. 6 Tab 0    clonazePAM (KLONOPIN) 0.5 mg tablet TAKE 1 TABLET EVERY NIGHT. MAX DAILY DOSE OF 0.5MG. 90 Tab 1    amLODIPine (NORVASC) 5 mg tablet TAKE 1 TABLET EVERY DAY 90 Tab 3    metoprolol succinate (TOPROL-XL) 50 mg XL tablet Take  by mouth daily.  potassium gluconate 550 mg (90 mg) tab Take  by mouth.  magnesium 250 mg tab Take  by mouth.       conjugated estrogens (PREMARIN) 0.625 mg/gram vaginal cream Insert 0.5 g into vagina daily.  calcium-cholecalciferol, d3, (CALCIUM 600 + D) 600-125 mg-unit tab Take  by mouth.  bumetanide (BUMEX) 1 mg tablet Take 1 Tab by mouth daily. Take 1 tablet by mouth in the morning on Tuesday and Thursday, 2 tablets on Monday, Wednesday, and Friday. (Patient taking differently: Take 1 mg by mouth daily. Take 1 tablet by mouth in the morning on Sunday, Tuesday, Thursday and Saturday, 2 tablets on Monday, Wednesday, and Friday.) 100 Tab 3    LYRICA 100 mg capsule TAKE ONE CAPSULE BY MOUTH 3 TIMES A DAY (Patient taking differently: 200mg 2 tabs daily) 90 Cap 2    venlafaxine (EFFEXOR) 75 mg tablet TAKE 2 TABLETS TWICE A  Tab 3    apixaban (ELIQUIS) 5 mg tablet Take 1 Tab by mouth two (2) times a day. 60 Tab 11    aspirin 81 mg chewable tablet Take 81 mg by mouth daily.  lisinopril (PRINIVIL, ZESTRIL) 20 mg tablet Take 1 Tab by mouth daily. 30 Tab 12    atorvastatin (LIPITOR) 40 mg tablet Take 1 Tab by mouth nightly. 30 Tab 12    Cholecalciferol, Vitamin D3, (VITAMIN D3) 1,000 unit cap Take  by mouth.        Social History     Social History    Marital status:      Spouse name: N/A    Number of children: N/A    Years of education: N/A     Social History Main Topics    Smoking status: Never Smoker    Smokeless tobacco: Never Used    Alcohol use No    Drug use: No    Sexual activity: Not Asked     Other Topics Concern    None     Social History Narrative     Family History   Problem Relation Age of Onset   [facility] Arthritis-osteo Mother     Hypertension Mother     High Cholesterol Mother     Crohn's Disease Mother     Heart Disease Mother     Alcohol abuse Father     High Cholesterol Sister     Hypertension Sister     Thyroid Disease Sister     COPD Sister     High Cholesterol Brother     Hypertension Brother     COPD Brother     COPD Child     Inflammatory Bowel Dz Child        Review of Systems  Constitutional: negative for fevers, chills, anorexia and weight loss  Eyes:   negative for visual disturbance and irritation  ENT:   Positive for some sinus congestion and post nasal drainage. Respiratory:  Positive for cough and chest congestion without wheezing  CV:   negative for chest pain, palpitations, lower extremity edema  GI:   negative for nausea, vomiting, diarrhea, abdominal pain,melena  Integumentary: negative for rash and pruritus  Neurological:  negative for headaches, dizziness, vertigo, memory problems and gait       Objective:  Visit Vitals    /70 (BP 1 Location: Right arm, BP Patient Position: Sitting)    Pulse (!) 49    Temp 97.7 °F (36.5 °C)    Ht 5' 9\" (1.753 m)    Wt 233 lb (105.7 kg)    SpO2 94%    BMI 34.41 kg/m2     Body mass index is 34.41 kg/(m^2). Physical Exam:   General appearance - alert, ill appearing, and in no distress  Mental status - alert, oriented to person, place, and time  EYE-VIVEK, EOMI, conjuctiva clear. No lid swelling or purulent drainage  ENT- TM's clear without A/F level. Pharynx slightly erythematous with drainage noted  Nose - normal and patent, no erythema,  Neck - supple, no significant adenopathy   Chest - Coarse upper airway rhonchi present without wheezing   Heart - normal rate, regular rhythm, normal S1, S2, no murmurs, rubs, clicks or gallops   Skin-No rash appreciated  Neuro -alert, oriented, normal speech, no focal findings. Assessment/Plan:  Diagnoses and all orders for this visit:    Acute bronchitis, unspecified organism  -     XR CHEST PA LAT; Future  -     Discontinue: azithromycin (ZITHROMAX) 250 mg tablet; Take 1 Tab by mouth See Admin Instructions. , Enaqtoo-[id number]-2 Tab, R-0  -     azithromycin (ZITHROMAX) 250 mg tablet; Take 1 Tab by mouth See Admin Instructions. , [id number]-7 Tab, R-0        Other Instructions:  Chest x-ray is reviewed and no pneumonia is seen    Mucinex-D as directed    Increase po fluids    Follow-up Disposition:  Return if symptoms worsen or fail to improve. I have reviewed with the patient details of the assessment and plan and all questions were answered. Relevent patient education was performed. An After Visit Summary was printed and given to the patient.     Kingston Medeiros MD

## 2018-08-07 NOTE — MR AVS SNAPSHOT
[address] [facility] 
 
 
 [address] [id number] [phone] Patient: Luly Daniels MRN: [id number] Visit Information Date & Time Provider Department Dept. Phone Encounter #  
 8/7/2018  9:40 AM Ivan Thornton MD [facility] [id number] Follow-up Instructions Return if symptoms worsen or fail to improve. Your Appointments 2/6/2019  8:00 AM  
FOLLOW UP 10 with MD Norman Callahan 26 ([facility]) Appt Note: 6 month follow up appointment [address] [phone] [address] [address] Upcoming Health Maintenance Date Due Hepatitis C Screening 1950 EYE EXAM RETINAL OR DILATED Q1 9/13/1962 DTaP/Tdap/Td series (1 - Tdap) 9/13/1973 BREAST CANCER SCRN MAMMOGRAM 9/13/2002 FOBT Q 1 YEAR AGE 50-75 9/13/2002 ZOSTER VACCINE AGE 60> 7/13/2012 GLAUCOMA SCREENING Q2Y 9/13/2017 Bone Densitometry (Dexa) Screening 9/13/2017 Pneumococcal 65+ Low/Medium Risk (1 of 2 - PCV13) 9/13/2017 MEDICARE YEARLY EXAM 3/14/2018 Influenza Age 5 to Adult 8/1/2018 HEMOGLOBIN A1C Q6M 2/1/2019 FOOT EXAM Q1 8/1/2019 MICROALBUMIN Q1 8/1/2019 LIPID PANEL Q1 8/1/2019 Allergies as of 8/7/2018  Review Complete On: 8/7/2018 By: Ivan Thornton MD  
  
 Severity Noted Reaction Type Reactions Sulfa (Sulfonamide Antibiotics) High 05/14/2010   Side Effect Swelling Amoxicillin  07/17/2014    Swelling Current Immunizations  Reviewed on 5/14/2010 Name Date Influenza High Dose Vaccine PF 9/20/2017 12:00 AM  
 Influenza Vaccine 10/1/2016 Pneumococcal Polysaccharide (PPSV-23)  Deferred (Patient Refused) Not reviewed this visit You Were Diagnosed With   
  
 Codes Comments Acute bronchitis, unspecified organism    -  Primary ICD-10-CM: J20.9 ICD-9-CM: 466.0 Vitals BP Pulse Temp Height(growth percentile) Weight(growth percentile) SpO2  
 102/70 (BP 1 Location: Right arm, BP Patient Position: Sitting) (!) 49 97.7 °F (36.5 °C) 5' 9\" (1.753 m) 233 lb (105.7 kg) 94% BMI OB Status Smoking Status 34.41 kg/m2 Hysterectomy Never Smoker BMI and BSA Data Body Mass Index Body Surface Area 34.41 kg/m 2 2.27 m 2 Preferred Pharmacy Pharmacy Name Phone [facility] [address] [phone] Your Updated Medication List  
  
   
This list is accurate as of 8/7/18  9:57 AM.  Always use your most recent med list. amLODIPine 5 mg tablet Commonly known as:  [place] TAKE 1 TABLET EVERY DAY  
  
 apixaban 5 mg tablet Commonly known as:  Kitty Can Take 1 Tab by mouth two (2) times a day. aspirin 81 mg chewable tablet Take 81 mg by mouth daily. atorvastatin 40 mg tablet Commonly known as:  LIPITOR Take 1 Tab by mouth nightly. azithromycin 250 mg tablet Commonly known as:  Idelia Fines Take 1 Tab by mouth See Admin Instructions. bumetanide 1 mg tablet Commonly known as:  [place] Take 1 Tab by mouth daily. Take 1 tablet by mouth in the morning on Tuesday and Thursday, 2 tablets on Monday, Wednesday, and Friday. CALCIUM 600 + D 600-125 mg-unit Tab Generic drug:  calcium-cholecalciferol (d3) Take  by mouth.  
  
 clonazePAM 0.5 mg tablet Commonly known as:  KlonoPIN  
TAKE 1 TABLET EVERY NIGHT. MAX DAILY DOSE OF 0.5MG. lisinopril 20 mg tablet Commonly known as:  [place] Take 1 Tab by mouth daily. LYRICA 100 mg capsule Generic drug:  pregabalin TAKE ONE CAPSULE BY MOUTH 3 TIMES A DAY  
  
 magnesium 250 mg Tab Take  by mouth.  
  
 metoprolol succinate 50 mg XL tablet Commonly known as:  TOPROL-XL  
 Take  by mouth daily. potassium gluconate 550 mg (90 mg) Tab Take  by mouth. PREMARIN 0.625 mg/gram vaginal cream  
Generic drug:  conjugated estrogens Insert 0.5 g into vagina daily. venlafaxine 75 mg tablet Commonly known as:  EFFEXOR  
TAKE 2 TABLETS TWICE A DAY  
  
 VITAMIN D3 1,000 unit Cap Generic drug:  cholecalciferol Take  by mouth. Prescriptions Sent to Pharmacy Refills  
 azithromycin (ZITHROMAX) 250 mg tablet 0 Sig: Take 1 Tab by mouth See Admin Instructions. Class: Normal  
 Pharmacy: [facility] DR DRE DELVALLE [address] [place] [address], [address] [place] [address] #: [phone] Route: Oral  
  
Follow-up Instructions Return if symptoms worsen or fail to improve. To-Do List   
 08/07/2018 Imaging:  XR CHEST PA LAT Introducing [facility]! Joann Degroot introduces [employer] patient portal. Now you can access parts of your medical record, email your doctor's office, and request medication refills online. 1. In your internet browser, go to [email or web address] 2. Click on the First Time User? Click Here link in the Sign In box. You will see the New Member Sign Up page. 3. Enter your [employer] Access Code exactly as it appears below. You will not need to use this code after youve completed the sign-up process. If you do not sign up before the expiration date, you must request a new code. · [employer] Access Code: [email or web address] Expires: 10/30/2018  9:35 AM 
 
4. Enter the last four digits of your Social Security Number (xxxx) and Date of Birth (mm/dd/yyyy) as indicated and click Submit. You will be taken to the next sign-up page. 5. Create a [employer] ID. This will be your [employer] login ID and cannot be changed, so think of one that is secure and easy to remember. 6. Create a [employer] password. You can change your password at any time. 7. Enter your Password Reset Question and Answer.  This can be used at a later time if you forget your password. 8. Enter your e-mail address. You will receive e-mail notification when new information is available in [address] 9. Click Sign Up. You can now view and download portions of your medical record. 10. Click the Download Summary menu link to download a portable copy of your medical information. If you have questions, please visit the Frequently Asked Questions section of the [employer] website. Remember, [facility] is NOT to be used for urgent needs. For medical emergencies, dial 911. Now available from your iPhone and Android! Please provide this summary of care documentation to your next provider. Your primary care clinician is listed as MARIO Valentine 21. If you have any questions after today's visit, please call [phone].

## 2018-08-21 ENCOUNTER — TELEPHONE (OUTPATIENT)
Dept: INTERNAL MEDICINE CLINIC | Age: 68
End: 2018-08-21

## 2018-08-21 RX ORDER — PREDNISONE 5 MG/1
TABLET ORAL
Qty: 30 TAB | Refills: 0 | Status: SHIPPED | OUTPATIENT
Start: 2018-08-21 | End: 2018-10-06 | Stop reason: CLARIF

## 2018-08-21 RX ORDER — DOXYCYCLINE HYCLATE 100 MG
100 TABLET ORAL 2 TIMES DAILY
Qty: 20 TAB | Refills: 0 | Status: SHIPPED | OUTPATIENT
Start: 2018-08-21 | End: 2018-08-31

## 2018-08-21 NOTE — TELEPHONE ENCOUNTER
These call her to get more information how she is doing.   If she febrile, if she coughing up purulent material, is she short of breath, is she having chest pain

## 2018-08-21 NOTE — TELEPHONE ENCOUNTER
Patient is calling, she states that at her most recent she was DX with bronchitis, after finishing the Zpac last week she is still experiencing symptoms of cough and wheezing. She would like to know what to do about this, she is now taking OTC mucinex and it is not helping.      Best call back # for patient: [phone]  Pharmacy on file verified

## 2018-09-10 ENCOUNTER — HOSPITAL ENCOUNTER (EMERGENCY)
Age: 68
Discharge: HOME | End: 2018-09-10
Attending: EMERGENCY MEDICINE
Payer: MEDICARE

## 2018-09-10 ENCOUNTER — APPOINTMENT (OUTPATIENT)
Dept: GENERAL RADIOLOGY | Age: 68
End: 2018-09-10
Attending: EMERGENCY MEDICINE
Payer: MEDICARE

## 2018-09-10 VITALS
BODY MASS INDEX: 35.1 KG/M2 | DIASTOLIC BLOOD PRESSURE: 85 MMHG | SYSTOLIC BLOOD PRESSURE: 112 MMHG | RESPIRATION RATE: 18 BRPM | HEART RATE: 100 BPM | TEMPERATURE: 97.2 F | OXYGEN SATURATION: 96 % | WEIGHT: 237 LBS | HEIGHT: 69 IN

## 2018-09-10 DIAGNOSIS — R07.9 CHEST PAIN, UNSPECIFIED TYPE: Primary | ICD-10-CM

## 2018-09-10 DIAGNOSIS — I48.19 PERSISTENT ATRIAL FIBRILLATION (HCC): ICD-10-CM

## 2018-09-10 LAB
ALBUMIN SERPL-MCNC: 3.8 G/DL (ref 3.5–5)
ALBUMIN/GLOB SERPL: 1 {RATIO} (ref 1.1–2.2)
ALP SERPL-CCNC: 118 U/L (ref 45–117)
ALT SERPL-CCNC: 28 U/L (ref 12–78)
ANION GAP SERPL CALC-SCNC: 7 MMOL/L (ref 5–15)
AST SERPL-CCNC: 20 U/L (ref 15–37)
ATRIAL RATE: 113 BPM
ATRIAL RATE: 122 BPM
BASOPHILS # BLD: 0 K/UL (ref 0–0.1)
BASOPHILS NFR BLD: 0 % (ref 0–1)
BILIRUB SERPL-MCNC: 0.8 MG/DL (ref 0.2–1)
BUN SERPL-MCNC: 25 MG/DL (ref 6–20)
BUN/CREAT SERPL: 18 (ref 12–20)
CALCIUM SERPL-MCNC: 9 MG/DL (ref 8.5–10.1)
CALCULATED R AXIS, ECG10: -11 DEGREES
CALCULATED R AXIS, ECG10: -8 DEGREES
CALCULATED T AXIS, ECG11: -62 DEGREES
CALCULATED T AXIS, ECG11: -94 DEGREES
CHLORIDE SERPL-SCNC: 108 MMOL/L (ref 97–108)
CO2 SERPL-SCNC: 30 MMOL/L (ref 21–32)
COMMENT, HOLDF: NORMAL
CREAT SERPL-MCNC: 1.38 MG/DL (ref 0.55–1.02)
DIAGNOSIS, 93000: NORMAL
DIAGNOSIS, 93000: NORMAL
DIFFERENTIAL METHOD BLD: ABNORMAL
EOSINOPHIL # BLD: 0.2 K/UL (ref 0–0.4)
EOSINOPHIL NFR BLD: 2 % (ref 0–7)
ERYTHROCYTE [DISTWIDTH] IN BLOOD BY AUTOMATED COUNT: 16.1 % (ref 11.5–14.5)
GLOBULIN SER CALC-MCNC: 3.7 G/DL (ref 2–4)
GLUCOSE SERPL-MCNC: 97 MG/DL (ref 65–100)
HCT VFR BLD AUTO: 44.3 % (ref 35–47)
HGB BLD-MCNC: 14.2 G/DL (ref 11.5–16)
IMM GRANULOCYTES # BLD: 0 K/UL (ref 0–0.04)
IMM GRANULOCYTES NFR BLD AUTO: 0 % (ref 0–0.5)
LYMPHOCYTES # BLD: 1.6 K/UL (ref 0.8–3.5)
LYMPHOCYTES NFR BLD: 22 % (ref 12–49)
MAGNESIUM SERPL-MCNC: 2.2 MG/DL (ref 1.6–2.4)
MCH RBC QN AUTO: 28.5 PG (ref 26–34)
MCHC RBC AUTO-ENTMCNC: 32.1 G/DL (ref 30–36.5)
MCV RBC AUTO: 88.8 FL (ref 80–99)
MONOCYTES # BLD: 0.4 K/UL (ref 0–1)
MONOCYTES NFR BLD: 6 % (ref 5–13)
NEUTS SEG # BLD: 5 K/UL (ref 1.8–8)
NEUTS SEG NFR BLD: 69 % (ref 32–75)
NRBC # BLD: 0 K/UL (ref 0–0.01)
NRBC BLD-RTO: 0 PER 100 WBC
PLATELET # BLD AUTO: 184 K/UL (ref 150–400)
PMV BLD AUTO: 12.3 FL (ref 8.9–12.9)
POTASSIUM SERPL-SCNC: 3 MMOL/L (ref 3.5–5.1)
PROT SERPL-MCNC: 7.5 G/DL (ref 6.4–8.2)
Q-T INTERVAL, ECG07: 390 MS
Q-T INTERVAL, ECG07: 404 MS
QRS DURATION, ECG06: 94 MS
QRS DURATION, ECG06: 96 MS
QTC CALCULATION (BEZET), ECG08: 518 MS
QTC CALCULATION (BEZET), ECG08: 568 MS
RBC # BLD AUTO: 4.99 M/UL (ref 3.8–5.2)
SAMPLES BEING HELD,HOLD: NORMAL
SODIUM SERPL-SCNC: 145 MMOL/L (ref 136–145)
TROPONIN I BLD-MCNC: <0.04 NG/ML (ref 0–0.08)
TROPONIN I BLD-MCNC: <0.04 NG/ML (ref 0–0.08)
VENTRICULAR RATE, ECG03: 106 BPM
VENTRICULAR RATE, ECG03: 119 BPM
WBC # BLD AUTO: 7.2 K/UL (ref 3.6–11)

## 2018-09-10 PROCEDURE — 85025 COMPLETE CBC W/AUTO DIFF WBC: CPT | Performed by: EMERGENCY MEDICINE

## 2018-09-10 PROCEDURE — 96361 HYDRATE IV INFUSION ADD-ON: CPT

## 2018-09-10 PROCEDURE — 74011250637 HC RX REV CODE- 250/637: Performed by: EMERGENCY MEDICINE

## 2018-09-10 PROCEDURE — 71045 X-RAY EXAM CHEST 1 VIEW: CPT

## 2018-09-10 PROCEDURE — 74011250636 HC RX REV CODE- 250/636: Performed by: EMERGENCY MEDICINE

## 2018-09-10 PROCEDURE — 74011000250 HC RX REV CODE- 250: Performed by: EMERGENCY MEDICINE

## 2018-09-10 PROCEDURE — 96375 TX/PRO/DX INJ NEW DRUG ADDON: CPT

## 2018-09-10 PROCEDURE — 36415 COLL VENOUS BLD VENIPUNCTURE: CPT | Performed by: EMERGENCY MEDICINE

## 2018-09-10 PROCEDURE — 93005 ELECTROCARDIOGRAM TRACING: CPT

## 2018-09-10 PROCEDURE — 83735 ASSAY OF MAGNESIUM: CPT | Performed by: EMERGENCY MEDICINE

## 2018-09-10 PROCEDURE — 99285 EMERGENCY DEPT VISIT HI MDM: CPT

## 2018-09-10 PROCEDURE — 80053 COMPREHEN METABOLIC PANEL: CPT | Performed by: EMERGENCY MEDICINE

## 2018-09-10 PROCEDURE — 84484 ASSAY OF TROPONIN QUANT: CPT

## 2018-09-10 PROCEDURE — 96365 THER/PROPH/DIAG IV INF INIT: CPT

## 2018-09-10 RX ORDER — METOPROLOL TARTRATE 5 MG/5ML
2.5 INJECTION INTRAVENOUS
Status: COMPLETED | OUTPATIENT
Start: 2018-09-10 | End: 2018-09-10

## 2018-09-10 RX ORDER — METOPROLOL SUCCINATE 50 MG/1
75 TABLET, EXTENDED RELEASE ORAL DAILY
Qty: 45 TAB | Refills: 0 | Status: SHIPPED | OUTPATIENT
Start: 2018-09-10 | End: 2018-10-06 | Stop reason: CLARIF

## 2018-09-10 RX ORDER — SODIUM CHLORIDE 900 MG/100ML
INJECTION INTRAVENOUS
Status: DISCONTINUED
Start: 2018-09-10 | End: 2018-09-10 | Stop reason: HOSPADM

## 2018-09-10 RX ORDER — POTASSIUM CHLORIDE 7.45 MG/ML
10 INJECTION INTRAVENOUS
Status: COMPLETED | OUTPATIENT
Start: 2018-09-10 | End: 2018-09-10

## 2018-09-10 RX ORDER — LIDOCAINE HYDROCHLORIDE 10 MG/ML
INJECTION, SOLUTION EPIDURAL; INFILTRATION; INTRACAUDAL; PERINEURAL
Status: DISCONTINUED
Start: 2018-09-10 | End: 2018-09-10 | Stop reason: HOSPADM

## 2018-09-10 RX ORDER — METOPROLOL TARTRATE 5 MG/5ML
5 INJECTION INTRAVENOUS ONCE
Status: DISCONTINUED | OUTPATIENT
Start: 2018-09-10 | End: 2018-09-10

## 2018-09-10 RX ORDER — ONDANSETRON 2 MG/ML
4 INJECTION INTRAMUSCULAR; INTRAVENOUS
Status: COMPLETED | OUTPATIENT
Start: 2018-09-10 | End: 2018-09-10

## 2018-09-10 RX ORDER — POTASSIUM CHLORIDE 7.45 MG/ML
10 INJECTION INTRAVENOUS
Status: DISCONTINUED | OUTPATIENT
Start: 2018-09-10 | End: 2018-09-10 | Stop reason: SDUPTHER

## 2018-09-10 RX ORDER — LIDOCAINE HYDROCHLORIDE 20 MG/ML
10 SOLUTION OROPHARYNGEAL AS NEEDED
Qty: 1 BOTTLE | Refills: 0 | Status: SHIPPED | OUTPATIENT
Start: 2018-09-10 | End: 2018-10-06 | Stop reason: CLARIF

## 2018-09-10 RX ORDER — HEPARIN SODIUM 200 [USP'U]/100ML
INJECTION, SOLUTION INTRAVENOUS
Status: DISCONTINUED
Start: 2018-09-10 | End: 2018-09-10 | Stop reason: HOSPADM

## 2018-09-10 RX ORDER — SODIUM CHLORIDE 9 MG/ML
150 INJECTION, SOLUTION INTRAVENOUS ONCE
Status: COMPLETED | OUTPATIENT
Start: 2018-09-10 | End: 2018-09-10

## 2018-09-10 RX ORDER — BIVALIRUDIN 250 MG/5ML
INJECTION, POWDER, LYOPHILIZED, FOR SOLUTION INTRAVENOUS
Status: DISCONTINUED
Start: 2018-09-10 | End: 2018-09-10 | Stop reason: HOSPADM

## 2018-09-10 RX ORDER — HYDROCODONE BITARTRATE AND ACETAMINOPHEN 7.5; 325 MG/1; MG/1
1 TABLET ORAL
Status: COMPLETED | OUTPATIENT
Start: 2018-09-10 | End: 2018-09-10

## 2018-09-10 RX ORDER — IODIXANOL 320 MG/ML
INJECTION, SOLUTION INTRAVASCULAR
Status: DISCONTINUED
Start: 2018-09-10 | End: 2018-09-10 | Stop reason: HOSPADM

## 2018-09-10 RX ORDER — OMEPRAZOLE 40 MG/1
40 CAPSULE, DELAYED RELEASE ORAL DAILY
Qty: 30 CAP | Refills: 0 | Status: SHIPPED | OUTPATIENT
Start: 2018-09-10 | End: 2018-10-06 | Stop reason: CLARIF

## 2018-09-10 RX ORDER — MORPHINE SULFATE 2 MG/ML
2 INJECTION, SOLUTION INTRAMUSCULAR; INTRAVENOUS
Status: COMPLETED | OUTPATIENT
Start: 2018-09-10 | End: 2018-09-10

## 2018-09-10 RX ADMIN — LIDOCAINE HYDROCHLORIDE 40 ML: 20 SOLUTION ORAL; TOPICAL at 14:28

## 2018-09-10 RX ADMIN — HYDROCODONE BITARTRATE AND ACETAMINOPHEN 1 TABLET: 7.5; 325 TABLET ORAL at 19:34

## 2018-09-10 RX ADMIN — SODIUM CHLORIDE 150 ML/HR: 900 INJECTION, SOLUTION INTRAVENOUS at 14:33

## 2018-09-10 RX ADMIN — MORPHINE SULFATE 2 MG: 2 INJECTION, SOLUTION INTRAMUSCULAR; INTRAVENOUS at 18:05

## 2018-09-10 RX ADMIN — METOPROLOL TARTRATE 2.5 MG: 1 INJECTION, SOLUTION INTRAVENOUS at 14:33

## 2018-09-10 RX ADMIN — ONDANSETRON 4 MG: 2 INJECTION INTRAMUSCULAR; INTRAVENOUS at 18:04

## 2018-09-10 RX ADMIN — POTASSIUM CHLORIDE 10 MEQ: 10 INJECTION, SOLUTION INTRAVENOUS at 14:34

## 2018-09-10 NOTE — CONSULTS
Consult    NAME: Luly Daniels   :  1950   MRN:  [id number]     Date/Time:  9/10/2018 1:24 PM    Patient PCP: Ivan Thornton MD  ________________________________________________________________________     Assessment:     1. Chest pain  2. Hypokalemia  3. Chronic atrial fibrillation s/p 2 ablations; device interrogation shows AF since   4. Coronary artery disease s/p ISELA to LAD   5. Dilated cardiomyopathy (mixed tachycardic/ischemic); EF 45% w/ mild MR by echo 10/17  6. Remote dual chamber PPM  ([facility])  7. Hypertension  8. Diabetes  9. Hyperlipidemia  10. SHAYLA on CPAP  11. Chronic kidney disease; Stg 3  12. Usual Cardiologist:  Dr. Logan Cruz        Plan:   Initial TnI neg  EKG:  AFib    Describes her pain as a burning and linear central CP, feels she needs Tums. 1. Continue to trend enzymes; if 2nd set negative can be discharged home once pain free for a stress test in the office as an outpt. No ischemic evaluation since cath/PCI in .  2. GI cocktail  3. Continue ASA  4. Continue Pradaxa  5. Increase Toprol XL to 75mg daily. At this time will plan for a rate-control strategy as discussed w/ Dr. Nikolas Mcintosh. 6. Continue norvasc 5mg  7. Continue atorvastatin 40mg  8. Continue lisinopril 20mg    Thank you for this consult and allowing me to take part in this patients care. Please call with questions. [x]        High complexity decision making was performed        Subjective:   CHIEF COMPLAINT: CP    HISTORY OF PRESENT ILLNESS:     Catrachito Escalante is a 74 y.o.  female who \"presents via EMS to the ED with cc of new onset 9/10 constant, sharp, sternal chest pain x1000 this morning. Pt reports associated sx of heart racing palpitations and fatigue as well. She expresses she began with pain this morning and ~1 hour later she called her  regarding her discomfort leading him to call EMS.  EMS discloses the pt was advised to take 325mg ASA and upon EMS arrival pt was slightly hypotensive at 89/51 mmHg with otherwise stable vitals. EMS reports 12 lead was concerning for STEMI and pt was brought to the ED for evaluation. Pt discloses a h/o MI with stent placement and a h/o A-fib with ablation x2 but denies her sx are similar. Of note pt is on Pradaxa x1 week recently switched from Eliquis for financial reasons. There are no exacerbating or alleviating factors to her discomfort. She denies any fevers, chills, SOB, leg swelling, abdominal pain, nausea, vomiting, diarrhea, or dysuria. \"       We were asked to consult for work up and evaluation of the above problems.      Past Medical History:   Diagnosis Date    Anxiety 1/22/2018    Arthritis     OA    Asthma     Diabetes ([facility]     Essential hypertension     GERD (gastroesophageal reflux disease)     Hypercholesterolemia 1/22/2018    Hypertension     Long-term use of high-risk medication 1/22/2018    Neuropathy     Other ill-defined conditions(799.89)     IBS, spinal stenosis    Plantar fasciitis     Psychiatric disorder     depression, anxiety    Sleep apnea     uses CPAP    Type 2 diabetes mellitus with diabetic neuropathy, without long-term current use of insulin ([facility] [address] 6/5/2016      Past Surgical History:   Procedure Laterality Date    CARDIAC SURG PROCEDURE UNLIST      stent    COLONOSCOPY N/A 3/14/2018    COLONOSCOPY performed by Noel Cavanaugh MD at [facility] ENDOSCOPY    HX APPENDECTOMY      HX HYSTERECTOMY      HX PACEMAKER       Allergies   Allergen Reactions    Sulfa (Sulfonamide Antibiotics) Swelling    Amoxicillin Swelling      Meds:  See below  Social History   Substance Use Topics    Smoking status: Never Smoker    Smokeless tobacco: Never Used    Alcohol use No      Family History   Problem Relation Age of Onset    Arthritis-osteo Mother     Hypertension Mother     High Cholesterol Mother     Crohn's Disease Mother     Heart Disease Mother     Alcohol abuse Father     High Cholesterol Sister  Hypertension Sister     Thyroid Disease Sister     COPD Sister     High Cholesterol Brother     Hypertension Brother     COPD Brother     COPD Child     Inflammatory Bowel Dz Child        REVIEW OF SYSTEMS:     []         Unable to obtain  ROS due to ---   [x]         Total of 12 systems reviewed as follows:    Constitutional: negative fever, negative chills, negative weight loss  Eyes:   negative visual changes  ENT:   negative sore throat, tongue or lip swelling  Respiratory:  negative cough, negative dyspnea  Cards:  negative for chest pain, palpitations, lower extremity edema  GI:   negative for nausea, vomiting, diarrhea, and abdominal pain  Genitourinary: negative for frequency, dysuria  Integument:  negative for rash   Hematologic:  negative for easy bruising and gum/nose bleeding  Musculoskel: negative for myalgias,  back pain  Neurological:  negative for headaches, dizziness, vertigo, weakness  Behavl/Psych: negative for feelings of anxiety, depression     Pertinent Positives include :    Objective:      Physical Exam:    Last 24hrs VS reviewed since prior progress note. Most recent are:    Visit Vitals    /76 (BP 1 Location: Left arm, BP Patient Position: At rest)    Pulse (!) 112    Temp 97.2 °F (36.2 °C)    Resp 12    Ht 5' 9\" (1.753 m)    Wt 107.5 kg (237 lb)    SpO2 99%    BMI 35 kg/m2     No intake or output data in the 24 hours ending 09/10/18 1324     General Appearance: Well developed, well nourished, alert & oriented x 3,    no acute distress. Obese. Ears/Nose/Mouth/Throat: Pupils equal and round, Hearing grossly normal.  Neck: Supple. JVP within normal limits. Carotids good upstrokes, with no bruit. Chest: Lungs clear to auscultation bilaterally. Cardiovascular: Irregular rate and rhythm, S1S2 normal, no murmur, rubs, gallops. Abdomen: Soft, non-tender, bowel sounds are active. No organomegaly. Extremities: Trivial edema bilaterally.  Femoral pulses +2, Distal Pulses +1. Skin: Warm and dry. Neuro: CN II-XII grossly intact, Strength and sensation grossly intact. []         Post-cath site without hematoma, bruit, tenderness, or thrill. Distal pulses intact. Data:      Telemetry:  AF    EKG: AF, [id number]  []  No new EKG for review. Prior to Admission medications    Medication Sig Start Date End Date Taking? Authorizing Provider   predniSONE (DELTASONE) 5 mg tablet 5 tablets day for days 1 through 4, then 4 tablets on day 5, then 3 tablets on day 6, then 2 tablets on day 7, then 1 tablet on day 8. 8/21/18   Alicia De La Cruz MD   azithromycin (ZITHROMAX) 250 mg tablet Take 1 Tab by mouth See Admin Instructions. 8/7/18   Alicia De La Cruz MD   clonazePAM (KLONOPIN) 0.5 mg tablet TAKE 1 TABLET EVERY NIGHT. MAX DAILY DOSE OF 0.5MG. 6/25/18   Alicia De La Cruz MD   amLODIPine (NORVASC) 5 mg tablet TAKE 1 TABLET EVERY DAY 4/27/18   Alicia De La Cruz MD   metoprolol succinate (TOPROL-XL) 50 mg XL tablet Take  by mouth daily. Historical Provider   potassium gluconate 550 mg (90 mg) tab Take  by mouth. Historical Provider   magnesium 250 mg tab Take  by mouth. Historical Provider   conjugated estrogens (PREMARIN) 0.625 mg/gram vaginal cream Insert 0.5 g into vagina daily. Historical Provider   calcium-cholecalciferol, d3, (CALCIUM 600 + D) 600-125 mg-unit tab Take  by mouth. Historical Provider   bumetanide (BUMEX) 1 mg tablet Take 1 Tab by mouth daily. Take 1 tablet by mouth in the morning on Tuesday and Thursday, 2 tablets on Monday, Wednesday, and Friday. Patient taking differently: Take 1 mg by mouth daily. Take 1 tablet by mouth in the morning on Sunday, Tuesday, Thursday and Saturday, 2 tablets on Monday, Wednesday, and Friday.  2/13/18   Alicia De La Cruz MD   LYRICA 100 mg capsule TAKE ONE CAPSULE BY MOUTH 3 TIMES A DAY  Patient taking differently: 200mg 2 tabs daily 12/19/16   Ruel Purvis NP   venlafaxine [facility]) 75 mg tablet TAKE 2 TABLETS TWICE A DAY 4/22/16   David Pichardo MD   apixaban (ELIQUIS) 5 mg tablet Take 1 Tab by mouth two (2) times a day. 4/20/16   Grant Carranza MD   aspirin 81 mg chewable tablet Take 81 mg by mouth daily. Historical Provider   lisinopril (PRINIVIL, ZESTRIL) 20 mg tablet Take 1 Tab by mouth daily. 4/9/14   Chuck Dexter MD   atorvastatin (LIPITOR) 40 mg tablet Take 1 Tab by mouth nightly. 4/9/14   Chuck Dexter MD   Cholecalciferol, Vitamin D3, (VITAMIN D3) 1,000 unit cap Take  by mouth. Historical Provider       Recent Results (from the past 24 hour(s))   EKG, 12 LEAD, INITIAL    Collection Time: 09/10/18 12:44 PM   Result Value Ref Range    Ventricular Rate 119 BPM    Atrial Rate 113 BPM    QRS Duration 94 ms    Q-T Interval 404 ms    QTC Calculation (Bezet) 568 ms    Calculated R Axis -11 degrees    Calculated T Axis -62 degrees    Diagnosis       Atrial fibrillation with rapid ventricular response  Nonspecific ST and T wave abnormality  When compared with ECG of 13-JUN-2016 14:31,  T wave inversion now evident in Inferior leads  Nonspecific T wave abnormality, worse in Anterolateral leads     CBC WITH AUTOMATED DIFF    Collection Time: 09/10/18 12:45 PM   Result Value Ref Range    WBC 7.2 3.6 - 11.0 K/uL    RBC 4.99 3.80 - 5.20 M/uL    HGB 14.2 11.5 - 16.0 g/dL    HCT 44.3 35.0 - 47.0 %    MCV 88.8 80.0 - 99.0 FL    MCH 28.5 26.0 - 34.0 PG    MCHC 32.1 30.0 - 36.5 g/dL    RDW 16.1 (H) 11.5 - 14.5 %    PLATELET 265 627 - 251 K/uL    MPV 12.3 8.9 - 12.9 FL    NRBC 0.0 0  WBC    ABSOLUTE NRBC 0.00 0.00 - 0.01 K/uL    NEUTROPHILS 69 32 - 75 %    LYMPHOCYTES 22 12 - 49 %    MONOCYTES 6 5 - 13 %    EOSINOPHILS 2 0 - 7 %    BASOPHILS 0 0 - 1 %    IMMATURE GRANULOCYTES 0 0.0 - 0.5 %    ABS. NEUTROPHILS 5.0 1.8 - 8.0 K/UL    ABS. LYMPHOCYTES 1.6 0.8 - 3.5 K/UL    ABS. MONOCYTES 0.4 0.0 - 1.0 K/UL    ABS. EOSINOPHILS 0.2 0.0 - 0.4 K/UL    ABS. BASOPHILS 0.0 0.0 - 0.1 K/UL    ABS. IMM. GRANS. 0.0 0.00 - 0.04 K/UL    DF AUTOMATED     SAMPLES BEING HELD    Collection Time: 09/10/18 12:45 PM   Result Value Ref Range    SAMPLES BEING HELD 1 LT BLUE 1 LAV     COMMENT        Add-on orders for these samples will be processed based on acceptable specimen integrity and analyte stability, which may vary by analyte.    POC TROPONIN-I    Collection Time: 09/10/18 12:48 PM   Result Value Ref Range    Troponin-I (POC) <0.04 0.00 - 0.08 ng/mL        Kadeem Michelle III, DO

## 2018-09-10 NOTE — ED PROVIDER NOTES
EMERGENCY DEPARTMENT HISTORY AND PHYSICAL EXAM 
 
 
Date: 9/10/2018 Patient Name: Shyla Quezada History of Presenting Illness Chief Complaint Patient presents with  Chest Pain The patient presents to the ED with complaints of chest pain that started at approximately 1000 this am.   
 
 
History Provided By: Patient and EMS 
 
HPI: Shyla Quezada, 74 y.o. female with PMHx significant for HTN, asthma, depression, anxiety, GERD, DM, hypercholesterolemia, CAD (MI with stent), A-fib (ablation x2), presents via EMS to the ED with cc of new onset 9/10 constant, sharp, sternal chest pain x1000 this morning. Pt reports associated sx of heart racing palpitations and fatigue as well. She expresses she began with pain this morning and ~1 hour later she called her  regarding her discomfort leading him to call EMS. EMS discloses the pt was advised to take 325mg ASA and upon EMS arrival pt was slightly hypotensive at 89/51 mmHg with otherwise stable vitals. EMS reports 12 lead was concerning for STEMI and pt was brought to the ED for evaluation. Pt discloses a h/o MI with stent placement and a h/o A-fib with ablation x2 but denies her sx are similar. Of note pt is on Pradaxa x1 week recently switched from Eliquis for financial reasons. There are no exacerbating or alleviating factors to her discomfort. She denies any fevers, chills, SOB, leg swelling, abdominal pain, nausea, vomiting, diarrhea, or dysuria. There are no other complaints, changes, or physical findings at this time. PCP: Darleen Bowman MD  
Specialist: Edgardo Fleming MD, [facility] (cardiology) Current Facility-Administered Medications Medication Dose Route Frequency Provider Last Rate Last Dose  bivalirudin (ANGIOMAX) 250 mg injection  lidocaine (PF) (XYLOCAINE) 10 mg/mL (1 %) injection  0.9% sodium chloride (MBP/ADV) infusion  ADDaptor  iodixanol (VISIPAQUE) 320 mg iodine/mL contrast injection  heparinized saline 2 units/mL 1,000 unit/500 mL infusion  nitroglycerin 1 mg/10mL injection Current Outpatient Prescriptions Medication Sig Dispense Refill  metoprolol succinate (TOPROL XL) 50 mg XL tablet Take 1.5 Tabs by mouth daily. 45 Tab 0  
 metoprolol succinate (TOPROL-XL) 50 mg XL tablet Take 1.5 Tabs by mouth daily. 45 Tab 0  
 lidocaine (XYLOCAINE) 2 % solution Take 10 mL by mouth as needed for Pain. 1 Bottle 0  
 omeprazole (PRILOSEC) 40 mg capsule Take 1 Cap by mouth daily. 30 Cap 0  predniSONE (DELTASONE) 5 mg tablet 5 tablets day for days 1 through 4, then 4 tablets on day 5, then 3 tablets on day 6, then 2 tablets on day 7, then 1 tablet on day 8. 30 Tab 0  
 azithromycin (ZITHROMAX) 250 mg tablet Take 1 Tab by mouth See Admin Instructions. 6 Tab 0  clonazePAM (KLONOPIN) 0.5 mg tablet TAKE 1 TABLET EVERY NIGHT. MAX DAILY DOSE OF 0.5MG. 90 Tab 1  
 amLODIPine (NORVASC) 5 mg tablet TAKE 1 TABLET EVERY DAY 90 Tab 3  potassium gluconate 550 mg (90 mg) tab Take  by mouth.  magnesium 250 mg tab Take  by mouth.  conjugated estrogens (PREMARIN) 0.625 mg/gram vaginal cream Insert 0.5 g into vagina daily.  calcium-cholecalciferol, d3, (CALCIUM 600 + D) 600-125 mg-unit tab Take  by mouth.  bumetanide (BUMEX) 1 mg tablet Take 1 Tab by mouth daily. Take 1 tablet by mouth in the morning on Tuesday and Thursday, 2 tablets on Monday, Wednesday, and Friday. (Patient taking differently: Take 1 mg by mouth daily.  Take 1 tablet by mouth in the morning on Sunday, Tuesday, Thursday and Saturday, 2 tablets on Monday, Wednesday, and Friday.) 100 Tab 3  
 LYRICA 100 mg capsule TAKE ONE CAPSULE BY MOUTH 3 TIMES A DAY (Patient taking differently: 200mg 2 tabs daily) 90 Cap 2  
 venlafaxine (EFFEXOR) 75 mg tablet TAKE 2 TABLETS TWICE A  Tab 3  
  apixaban (ELIQUIS) 5 mg tablet Take 1 Tab by mouth two (2) times a day. 60 Tab 11  
 aspirin 81 mg chewable tablet Take 81 mg by mouth daily.  lisinopril (PRINIVIL, ZESTRIL) 20 mg tablet Take 1 Tab by mouth daily. 30 Tab 12  
 atorvastatin (LIPITOR) 40 mg tablet Take 1 Tab by mouth nightly. 30 Tab 12  Cholecalciferol, Vitamin D3, (VITAMIN D3) 1,000 unit cap Take  by mouth. Past History Past Medical History: 
Past Medical History:  
Diagnosis Date  Anxiety 1/22/2018  Arthritis OA  Asthma  Diabetes ([facility] [address]  Essential hypertension  GERD (gastroesophageal reflux disease)  Hypercholesterolemia 1/22/2018  Hypertension  Long-term use of high-risk medication 1/22/2018  Neuropathy  Other ill-defined conditions(799.89) IBS, spinal stenosis  Plantar fasciitis  Psychiatric disorder   
 depression, anxiety  Sleep apnea   
 uses CPAP  Type 2 diabetes mellitus with diabetic neuropathy, without long-term current use of insulin ([facility] [address] 6/5/2016 Past Surgical History: 
Past Surgical History:  
Procedure Laterality Date  CARDIAC SURG PROCEDURE UNLIST    
 stent  COLONOSCOPY N/A 3/14/2018 COLONOSCOPY performed by Mendy Moncada MD at [facility]  HX HYSTERECTOMY  HX PACEMAKER Family History: 
Family History Problem Relation Age of Onset [facility] Arthritis-osteo Mother  Hypertension Mother  High Cholesterol Mother  Crohn's Disease Mother  Heart Disease Mother  Alcohol abuse Father  High Cholesterol Sister  Hypertension Sister  Thyroid Disease Sister  COPD Sister  High Cholesterol Brother  Hypertension Brother  COPD Brother  COPD Child  Inflammatory Bowel Dz Child Social History: 
Social History Substance Use Topics  Smoking status: Never Smoker  Smokeless tobacco: Never Used  Alcohol use No  
 
 
Allergies: Allergies Allergen Reactions  Sulfa (Sulfonamide Antibiotics) Swelling  Amoxicillin Swelling Review of Systems Review of Systems Constitutional: Positive for fatigue. Negative for chills and fever. Respiratory: Negative for cough and shortness of breath. Cardiovascular: Positive for chest pain (sternal ) and palpitations (heart racing ). Negative for leg swelling. Gastrointestinal: Negative for abdominal pain, diarrhea, nausea and vomiting. Genitourinary: Negative for dysuria. Musculoskeletal: Negative for arthralgias and myalgias. Skin: Negative for rash. All other systems reviewed and are negative. Physical Exam  
Physical Exam  
Vital signs and nursing notes reviewed CONSTITUTIONAL: Alert, in moderate distress; well-developed; obese body habitus, pale appearing. HEAD:  Normocephalic, atraumatic EYES: PERRL; EOM's intact. ENTM: Nose: no rhinorrhea; Throat: no erythema or exudate, mucous membranes moist 
Neck:  Supple. trachea is midline. RESP: Chest clear, equal breath sounds. - W/R/R 
CV: Irregularly irregular tachycardia; No murmurs, gallops or rubs. 2+ radial and DP pulses bilaterally. GI: non-distended, normal bowel sounds, abdomen soft and non-tender. No masses or organomegaly. : No costo-vertebral angle tenderness. BACK:  Non-tender, normal appearance UPPER EXT:  Normal inspection. no joint or soft tissue swelling LOWER EXT: 2+ edema B/L, no calf tenderness. NEURO: Alert and oriented x3, 5/5 strength and light touch sensation intact in bilateral upper and lower extremities. SKIN: No rashes; Warm and dry PSYCH: Normal mood, normal affect Diagnostic Study Results Labs - Recent Results (from the past 12 hour(s)) EKG, 12 LEAD, INITIAL Collection Time: 09/10/18 12:44 PM  
Result Value Ref Range Ventricular Rate 119 BPM  
 Atrial Rate 113 BPM  
 QRS Duration 94 ms Q-T Interval 404 ms QTC Calculation (Bezet) 568 ms Calculated R Axis -11 degrees Calculated T Axis -62 degrees Diagnosis Atrial fibrillation with rapid ventricular response Nonspecific ST and T wave abnormality When compared with ECG of 13-JUN-2016 14:31, 
T wave inversion now evident in Inferior leads Nonspecific T wave abnormality, worse in Anterolateral leads CBC WITH AUTOMATED DIFF Collection Time: 09/10/18 12:45 PM  
Result Value Ref Range WBC 7.2 3.6 - 11.0 K/uL  
 RBC 4.99 3.80 - 5.20 M/uL  
 HGB 14.2 11.5 - 16.0 g/dL HCT 44.3 35.0 - 47.0 % MCV 88.8 80.0 - 99.0 FL  
 MCH 28.5 26.0 - 34.0 PG  
 MCHC 32.1 30.0 - 36.5 g/dL  
 RDW 16.1 (H) 11.5 - 14.5 % PLATELET 554 655 - 162 K/uL MPV 12.3 8.9 - 12.9 FL  
 NRBC 0.0 0  WBC ABSOLUTE NRBC 0.00 0.00 - 0.01 K/uL NEUTROPHILS 69 32 - 75 % LYMPHOCYTES 22 12 - 49 % MONOCYTES 6 5 - 13 % EOSINOPHILS 2 0 - 7 % BASOPHILS 0 0 - 1 % IMMATURE GRANULOCYTES 0 0.0 - 0.5 % ABS. NEUTROPHILS 5.0 1.8 - 8.0 K/UL  
 ABS. LYMPHOCYTES 1.6 0.8 - 3.5 K/UL  
 ABS. MONOCYTES 0.4 0.0 - 1.0 K/UL  
 ABS. EOSINOPHILS 0.2 0.0 - 0.4 K/UL  
 ABS. BASOPHILS 0.0 0.0 - 0.1 K/UL  
 ABS. IMM. GRANS. 0.0 0.00 - 0.04 K/UL  
 DF AUTOMATED METABOLIC PANEL, COMPREHENSIVE Collection Time: 09/10/18 12:45 PM  
Result Value Ref Range Sodium 145 136 - 145 mmol/L Potassium 3.0 (L) 3.5 - 5.1 mmol/L Chloride 108 97 - 108 mmol/L  
 CO2 30 21 - 32 mmol/L Anion gap 7 5 - 15 mmol/L Glucose 97 65 - 100 mg/dL BUN 25 (H) 6 - 20 MG/DL Creatinine 1.38 (H) 0.55 - 1.02 MG/DL  
 BUN/Creatinine ratio 18 12 - 20 GFR est AA 47 (L) >60 ml/min/1.73m2 GFR est non-AA 38 (L) >60 ml/min/1.73m2 Calcium 9.0 8.5 - 10.1 MG/DL Bilirubin, total 0.8 0.2 - 1.0 MG/DL  
 ALT (SGPT) 28 12 - 78 U/L  
 AST (SGOT) 20 15 - 37 U/L Alk. phosphatase 118 (H) 45 - 117 U/L Protein, total 7.5 6.4 - 8.2 g/dL Albumin 3.8 3.5 - 5.0 g/dL Globulin 3.7 2.0 - 4.0 g/dL A-G Ratio 1.0 (L) 1.1 - 2.2 SAMPLES BEING HELD Collection Time: 09/10/18 12:45 PM  
Result Value Ref Range SAMPLES BEING HELD 1 LT BLUE 1 LAV   
 COMMENT Add-on orders for these samples will be processed based on acceptable specimen integrity and analyte stability, which may vary by analyte. MAGNESIUM Collection Time: 09/10/18 12:45 PM  
Result Value Ref Range Magnesium 2.2 1.6 - 2.4 mg/dL POC TROPONIN-I Collection Time: 09/10/18 12:48 PM  
Result Value Ref Range Troponin-I (POC) <0.04 0.00 - 0.08 ng/mL EKG, 12 LEAD, INITIAL Collection Time: 09/10/18  1:53 PM  
Result Value Ref Range Ventricular Rate 106 BPM  
 Atrial Rate 122 BPM  
 QRS Duration 96 ms  
 Q-T Interval 390 ms QTC Calculation (Bezet) 518 ms Calculated R Axis -8 degrees Calculated T Axis -94 degrees Diagnosis Atrial fibrillation with rapid ventricular response ST & T wave abnormality, consider inferior ischemia ST & T wave abnormality, consider anterolateral ischemia When compared with ECG of 10-SEP-2018 12:44, 
MANUAL COMPARISON REQUIRED, DATA IS UNCONFIRMED 
  
EKG, 12 LEAD, SUBSEQUENT Collection Time: 09/10/18  3:43 PM  
Result Value Ref Range Ventricular Rate 98 BPM  
 Atrial Rate 115 BPM  
 QRS Duration 80 ms  
 Q-T Interval 324 ms QTC Calculation (Bezet) 413 ms Calculated R Axis -16 degrees Calculated T Axis -56 degrees Diagnosis Atrial fibrillation with a competing junctional pacemaker Nonspecific ST and T wave abnormality When compared with ECG of 10-SEP-2018 13:53, 
MANUAL COMPARISON REQUIRED, DATA IS UNCONFIRMED 
  
POC TROPONIN-I Collection Time: 09/10/18  3:53 PM  
Result Value Ref Range Troponin-I (POC) <0.04 0.00 - 0.08 ng/mL Radiologic Studies - CXR Results  (Last 48 hours) 09/10/18 1339  XR CHEST PORT Final result Impression:  IMPRESSION:  
1. No acute process  Narrative:  EXAM:  XR CHEST PORT  
   
 INDICATION:  Chest Pain COMPARISON:  Chest pain starting this a.m. COMPARISON: 8/7/2018 FINDINGS: A portable AP radiograph of the chest was obtained at 1335 hours. The  
patient is on a cardiac monitor. Heart size is at the upper limits of normal.  
Pacemaker is in place. Lungs are well aerated and clear. Visualized osseous  
structures are unremarkable. Medical Decision Making I am the first provider for this patient. I reviewed the vital signs, available nursing notes, past medical history, past surgical history, family history and social history. Vital Signs-Reviewed the patient's vital signs. Patient Vitals for the past 12 hrs: 
 Temp Pulse Resp BP SpO2  
09/10/18 1808 - 94 18 - 97 % 09/10/18 1806 - (!) 106 21 - 95 % 09/10/18 1800 - (!) 102 16 (!) 129/100 93 % 09/10/18 1717 - 98 17 - 95 % 09/10/18 1715 - (!) 101 23 110/82 -  
09/10/18 1700 - 100 19 112/84 96 % 09/10/18 1624 - (!) 102 13 - 94 % 09/10/18 1618 - 99 26 - 96 % 09/10/18 1600 - (!) 103 13 (!) 126/93 96 % 09/10/18 1535 - (!) 111 18 - 97 % 09/10/18 1502 - 99 14 - 96 % 09/10/18 1433 - (!) 106 - (!) 144/93 -  
09/10/18 1431 - (!) 101 11 (!) 144/93 97 % 09/10/18 1415 - 99 14 113/86 95 % 09/10/18 1400 - 100 19 130/75 95 % 09/10/18 1345 - (!) 108 13 131/89 96 % 09/10/18 1330 - (!) 107 26 (!) 114/95 98 % 09/10/18 1315 - (!) 107 11 134/86 96 % 09/10/18 1242 97.2 °F (36.2 °C) (!) 112 12 127/76 99 % Pulse Oximetry Analysis - 99% on 2L nasal cannula Cardiac Monitor:  
Rate: 112 bpm 
Rhythm: Atrial Fibrillation with RVR EKG interpretation: (Preliminary) 0484 31 29 02 Rhythm: atrial fib with RVR; and irregular. Rate (approx.): 119; Axis: normal; QRS interval: normal ; ST/T wave: ST depression V4-V6; Other findings: no acute ischemic changes. EKG interpretation: (Repeat) 1353 Rhythm: atrial fib with RVR; and irregular.  Rate (approx.): 106; Axis: normal; QRS interval: normal ; ST/T wave: ST depression and T-wave inversion in V4-V6; Other findings: unchanged from previous ekg. Records Reviewed: Nursing Notes, Old Medical Records, Previous electrocardiograms, Ambulance Run Sheet, Previous Radiology Studies and Previous Laboratory Studies Provider Notes (Medical Decision Making):  
74year old female with known h/o A-fib here today with chest pain with initial reassuring troponin. Will repeat at 3 hours along with repeat EKG. Cardiology has seen and if pt has negative troponin x2 and feeling better with rate control could possibly be discharged later. ED Course:  
Initial assessment performed. The patients presenting problems have been discussed, and they are in agreement with the care plan formulated and outlined with them. I have encouraged them to ask questions as they arise throughout their visit. Progress Notes: 
 
CONSULT NOTE: 
1:55 PM 
Vladimir Correia MD spoke with Dr. Anjana Shi, Specialty: Cardiology Discussed pt's hx, disposition, and available diagnostic and imaging results. Reviewed care plans. Consultant recommends holding labetalol and obtaining repeat troponin at 3 hours and providing GI cocktail. If repeat troponin is negative and pt is pain free pt can be discharged home. Written by Mode Galdamez, ED Scribe, as dictated by Vladimir Correia MD 
 
SIGN OUT: 
2:53 PM 
Patient's presentation, labs/imaging and plan of care was reviewed with Keith Dewitt DO as part of sign out. They will wait for repeat troponin as part of the plan discussed with the patient. Keith Dewitt DO's assistance in completion of this plan is greatly appreciated but it should be noted that I will be the provider of record for this patient. Written by Mode Galdamez ED Scribe as dictated by Vladimir Correia MD 
 
3:55 PM 
Keith Dewitt DO spoke with Dr. Aly Morgan who recommends discharging with PPI if second troponin negative. He states he will send prescription to increase metoprolol. 5:20 PM 
Re-evaluated pt. She states her pain has not improved. Will order Morphine. Written by ABRAM Man, as dictated by Tasha Benson DO. 
 
6:42 PM 
Will discharge on PPI and increased Metoprolol. Written by Crista Lester ED Scribchris, as dictated by Tasha Benson DO. 
 
Critical Care Time: CRITICAL CARE NOTE : 
2:55 PM 
IMPENDING DETERIORATION -Cardiovascular ASSOCIATED RISK FACTORS - Hypotension and Dysrhythmia MANAGEMENT- Bedside Assessment and Supervision of Care INTERPRETATION -  Xrays, ECG, Blood Pressure and Cardiac Output Measures INTERVENTIONS - chemical rate control CASE REVIEW - Medical Sub-Specialist, Nursing and Family TREATMENT RESPONSE -Stable PERFORMED BY - Self NOTES   : 
I have spent 37 minutes of critical care time involved in lab review, consultations with specialist, family decision- making, bedside attention and documentation. During this entire length of time I was immediately available to the patient . Written by Brody Galdamez ED Scribe as dictated by Veronique Brody MD 
 
Discharge Note: 
6:55 PM 
The patient has been re-evaluated and is ready for discharge. Reviewed available results with patient. Counseled patient on diagnosis and care plan. Patient has expressed understanding, and all questions have been answered. Patient agrees with plan and agrees to follow up as recommended, or to return to the ED if their symptoms worsen. Discharge instructions have been provided and explained to the patient, along with reasons to return to the ED. PLAN: 
1. Current Discharge Medication List  
  
START taking these medications Details  
lidocaine (XYLOCAINE) 2 % solution Take 10 mL by mouth as needed for Pain. Qty: 1 Bottle, Refills: 0  
  
omeprazole (PRILOSEC) 40 mg capsule Take 1 Cap by mouth daily. Qty: 30 Cap, Refills: 0 CONTINUE these medications which have CHANGED Details  
!! metoprolol succinate (TOPROL XL) 50 mg XL tablet Take 1.5 Tabs by mouth daily. Qty: 45 Tab, Refills: 0  
  
!! metoprolol succinate (TOPROL-XL) 50 mg XL tablet Take 1.5 Tabs by mouth daily. Qty: 45 Tab, Refills: 0  
  
 !! - Potential duplicate medications found. Please discuss with provider. 2.  
Follow-up Information Follow up With Details Comments Contact Info Brenda Ross MD   [address] [facility] 
[phone] Maritza Montalvo III, DO   [address] [facility] 
[phone] Return to ED if worse Diagnosis Clinical Impression: 1. Chest pain, unspecified type 2. Persistent atrial fibrillation ([facility] [address] Attestations: 
 
Attestation: This note is prepared by Daniele Galdamez, acting as Scribe for Steven Pierre MD. Steven Pierre MD: The scribe's documentation has been prepared under my direction and personally reviewed by me in its entirety. I confirm that the note above accurately reflects all work, treatment, procedures, and medical decision making performed by me.

## 2018-09-10 NOTE — ED NOTES
Pt c/o increasing chest pain. Dr. Kimberlee Garcia notified. Verbal orders received for 2mg morphine IV and 4mg zofran IV. Dr. Kimberlee Garcia notified of pt's negative troponin.

## 2018-09-10 NOTE — DISCHARGE INSTRUCTIONS
Learning About Atrial Fibrillation  What is atrial fibrillation? Atrial fibrillation (say \"AY-tree-tierra eio-qcav-FFL-shun\") is the most common type of irregular heartbeat (arrhythmia). Normally, the heart beats in a strong, steady rhythm. In atrial fibrillation, a problem with the heart's electrical system causes the two upper parts of the heart (the atria) to quiver, or fibrillate. Your heart rate also may be faster than normal.  Atrial fibrillation can be dangerous because if the heartbeat isn't strong and steady, blood can collect, or pool, in the atria. And pooled blood is more likely to form clots. Clots can travel to the brain, block blood flow, and cause a stroke. Atrial fibrillation can also lead to heart failure. Treatment for atrial fibrillation helps prevent stroke and heart failure. It also helps relieve symptoms. Atrial fibrillation is often caused by another heart problem. It may happen after heart surgery. It may also be caused by other problems, such as an overactive thyroid gland or lung disease. Many people with atrial fibrillation are able to live full and active lives. What are the symptoms? Some people feel symptoms when they have episodes of atrial fibrillation. But other people don't notice any symptoms. If you have symptoms, you may feel:  · A fluttering, racing, or pounding feeling in your chest called palpitations. · Weak or tired. · Dizzy or lightheaded. · Short of breath. · Chest pain. · Confused. You may notice signs of atrial fibrillation when you check your pulse. Your pulse may seem uneven or fast.  What can you expect when you have atrial fibrillation? At first, spells of atrial fibrillation may come on suddenly and last a short time. It may go away on its own or it goes away after treatment. This is called paroxysmal atrial fibrillation. Over time, the spells may last longer and occur more often. They often don't go away on their own.   How is it treated? Treatments can help you feel better and prevent future problems, especially stroke and heart failure. The main types of treatment slow the heart rate, control the heart rhythm, and help prevent stroke. Your treatment will depend on the cause of your atrial fibrillation, your symptoms, and your risk for stroke. · Heart rate treatment. Medicine may be used to slow your heart rate. Your heartbeat may still be irregular. But these medicines keep your heart from beating too fast. They may also help relieve your symptoms. · Heart rhythm treatment. Different treatments may be used to try to stop atrial fibrillation and keep it from returning. They can also relieve symptoms. These treatments include medicine, electrical cardioversion to shock the heart back to a normal rhythm, a procedure called catheter ablation, and heart surgery. · Stroke prevention. You and your doctor can decide how to lower your risk. You may decide to take a blood-thinning medicine, such as aspirin or an anticoagulant. How can you live well with it? You can live well and help manage atrial fibrillation by having a heart-healthy lifestyle. To have a heart-healthy lifestyle:  · Don't smoke. · Eat heart-healthy foods. · Be active. Talk to your doctor about what type and level of exercise is safe for you. · Stay at a healthy weight. Lose weight if you need to. · Manage stress. · Avoid alcohol if it triggers symptoms. · Manage other health problems such as high blood pressure, high cholesterol, and diabetes. · Avoid getting sick from the flu. Get a flu shot every year. Where can you learn more? Go to http://dawood-vera.info/. Enter 545-320-6457 in the search box to learn more about \"Learning About Atrial Fibrillation. \"  Current as of: December 6, 2017  Content Version: 11.7  © 0673-1827 [employer].  Care instructions adapted under license by [employer] (which disclaims liability or warranty for this information). If you have questions about a medical condition or this instruction, always ask your healthcare professional. [place] [address] any warranty or liability for your use of this information.

## 2018-09-10 NOTE — ED NOTES
Discontinue Regimen: Tretinoin 0.05 % topical cream (Apply pea size amount to face at bedtime, onto dry skin ( can mix half-half w Cetaphil lot if too scaly)), Benzoyl peroxide 2.5 % topical gel (Apply daily to face in the AM), and Doxycycline hyclate 100 mg capsule (Take one pill po qd daily with a full glass of water, do not lie down until 1 hour after taking.). Dr. Kristen Pineda reviewed discharge instructions with the patient. The patient verbalized understanding. All questions and concerns were addressed. The patient is discharged via wheelchair in the care of family members with instructions and prescriptions in hand. Pt is alert and oriented x 4. Respirations are clear and unlabored. Detail Level: Simple Continue Regimen: Cetaphil gentle cleanser. Wash face BID \\nCetaphil oil control moisturizer 30 SPF apply to face daily.

## 2018-09-11 LAB
ATRIAL RATE: 115 BPM
CALCULATED R AXIS, ECG10: -16 DEGREES
CALCULATED T AXIS, ECG11: -56 DEGREES
DIAGNOSIS, 93000: NORMAL
Q-T INTERVAL, ECG07: 324 MS
QRS DURATION, ECG06: 80 MS
QTC CALCULATION (BEZET), ECG08: 413 MS
VENTRICULAR RATE, ECG03: 98 BPM

## 2018-10-03 DIAGNOSIS — I10 ESSENTIAL HYPERTENSION: ICD-10-CM

## 2018-10-03 RX ORDER — BUMETANIDE 1 MG/1
TABLET ORAL
Qty: 103 TAB | Refills: 3 | Status: ON HOLD | OUTPATIENT
Start: 2018-10-03 | End: 2018-10-20 | Stop reason: SDUPTHER

## 2018-10-06 ENCOUNTER — APPOINTMENT (OUTPATIENT)
Dept: GENERAL RADIOLOGY | Age: 68
DRG: 226 | End: 2018-10-06
Attending: EMERGENCY MEDICINE
Payer: MEDICARE

## 2018-10-06 ENCOUNTER — APPOINTMENT (OUTPATIENT)
Dept: GENERAL RADIOLOGY | Age: 68
DRG: 226 | End: 2018-10-06
Attending: INTERNAL MEDICINE
Payer: MEDICARE

## 2018-10-06 ENCOUNTER — HOSPITAL ENCOUNTER (INPATIENT)
Age: 68
LOS: 16 days | Discharge: NURSING, REHAB OR LONG-TERM CARE | DRG: 226 | End: 2018-10-22
Attending: EMERGENCY MEDICINE | Admitting: INTERNAL MEDICINE
Payer: MEDICARE

## 2018-10-06 DIAGNOSIS — F41.9 ANXIETY: ICD-10-CM

## 2018-10-06 DIAGNOSIS — I48.20 CHRONIC ATRIAL FIBRILLATION (HCC): Chronic | ICD-10-CM

## 2018-10-06 DIAGNOSIS — E11.40 TYPE 2 DIABETES MELLITUS WITH DIABETIC NEUROPATHY, WITHOUT LONG-TERM CURRENT USE OF INSULIN (HCC): Chronic | ICD-10-CM

## 2018-10-06 DIAGNOSIS — I10 ESSENTIAL HYPERTENSION: ICD-10-CM

## 2018-10-06 DIAGNOSIS — N17.9 AKI (ACUTE KIDNEY INJURY) (HCC): ICD-10-CM

## 2018-10-06 DIAGNOSIS — I50.21 ACUTE SYSTOLIC HEART FAILURE (HCC): ICD-10-CM

## 2018-10-06 DIAGNOSIS — I48.91 ATRIAL FIBRILLATION WITH RAPID VENTRICULAR RESPONSE (HCC): Primary | ICD-10-CM

## 2018-10-06 DIAGNOSIS — I50.9 ACUTE CONGESTIVE HEART FAILURE, UNSPECIFIED HEART FAILURE TYPE (HCC): ICD-10-CM

## 2018-10-06 DIAGNOSIS — J96.01 ACUTE RESPIRATORY FAILURE WITH HYPOXEMIA (HCC): ICD-10-CM

## 2018-10-06 DIAGNOSIS — E87.6 HYPOKALEMIA: ICD-10-CM

## 2018-10-06 PROBLEM — J96.00 ACUTE RESPIRATORY FAILURE (HCC): Status: ACTIVE | Noted: 2018-10-06

## 2018-10-06 LAB
ALBUMIN SERPL-MCNC: 3.3 G/DL (ref 3.5–5)
ALBUMIN/GLOB SERPL: 0.9 {RATIO} (ref 1.1–2.2)
ALP SERPL-CCNC: 127 U/L (ref 45–117)
ALT SERPL-CCNC: 20 U/L (ref 12–78)
ANION GAP SERPL CALC-SCNC: 9 MMOL/L (ref 5–15)
ARTERIAL PATENCY WRIST A: ABNORMAL
ARTERIAL PATENCY WRIST A: YES
AST SERPL-CCNC: 17 U/L (ref 15–37)
BASE DEFICIT BLDA-SCNC: 0.2 MMOL/L
BASE DEFICIT BLDA-SCNC: 1.9 MMOL/L
BASOPHILS # BLD: 0 K/UL (ref 0–0.1)
BASOPHILS NFR BLD: 0 % (ref 0–1)
BDY SITE: ABNORMAL
BDY SITE: NORMAL
BILIRUB SERPL-MCNC: 1 MG/DL (ref 0.2–1)
BNP SERPL-MCNC: 6678 PG/ML (ref 0–125)
BUN SERPL-MCNC: 22 MG/DL (ref 6–20)
BUN/CREAT SERPL: 16 (ref 12–20)
CALCIUM SERPL-MCNC: 8.4 MG/DL (ref 8.5–10.1)
CHLORIDE SERPL-SCNC: 108 MMOL/L (ref 97–108)
CK SERPL-CCNC: 59 U/L (ref 26–192)
CO2 SERPL-SCNC: 28 MMOL/L (ref 21–32)
COMMENT, HOLDF: NORMAL
CREAT SERPL-MCNC: 1.35 MG/DL (ref 0.55–1.02)
DIFFERENTIAL METHOD BLD: ABNORMAL
EOSINOPHIL # BLD: 0.2 K/UL (ref 0–0.4)
EOSINOPHIL NFR BLD: 2 % (ref 0–7)
EPAP/CPAP/PEEP, PAPEEP: 6
ERYTHROCYTE [DISTWIDTH] IN BLOOD BY AUTOMATED COUNT: 18.6 % (ref 11.5–14.5)
FIO2 ON VENT: 100 %
GAS FLOW.O2 O2 DELIVERY SYS: 6 L/MIN
GAS FLOW.O2 SETTING OXYMISER: 16 L/MIN
GLOBULIN SER CALC-MCNC: 3.6 G/DL (ref 2–4)
GLUCOSE SERPL-MCNC: 140 MG/DL (ref 65–100)
HCO3 BLDA-SCNC: 24 MMOL/L (ref 22–26)
HCO3 BLDA-SCNC: 24 MMOL/L (ref 22–26)
HCT VFR BLD AUTO: 39.7 % (ref 35–47)
HGB BLD-MCNC: 12.9 G/DL (ref 11.5–16)
IMM GRANULOCYTES # BLD: 0.1 K/UL (ref 0–0.04)
IMM GRANULOCYTES NFR BLD AUTO: 1 % (ref 0–0.5)
LYMPHOCYTES # BLD: 1.8 K/UL (ref 0.8–3.5)
LYMPHOCYTES NFR BLD: 19 % (ref 12–49)
MAGNESIUM SERPL-MCNC: 1.9 MG/DL (ref 1.6–2.4)
MCH RBC QN AUTO: 29.1 PG (ref 26–34)
MCHC RBC AUTO-ENTMCNC: 32.5 G/DL (ref 30–36.5)
MCV RBC AUTO: 89.4 FL (ref 80–99)
MONOCYTES # BLD: 0.6 K/UL (ref 0–1)
MONOCYTES NFR BLD: 6 % (ref 5–13)
NEUTS SEG # BLD: 6.8 K/UL (ref 1.8–8)
NEUTS SEG NFR BLD: 72 % (ref 32–75)
NRBC # BLD: 0 K/UL (ref 0–0.01)
NRBC BLD-RTO: 0 PER 100 WBC
PCO2 BLDA: 39 MMHG (ref 35–45)
PCO2 BLDA: 44 MMHG (ref 35–45)
PH BLDA: 7.35 [PH] (ref 7.35–7.45)
PH BLDA: 7.41 [PH] (ref 7.35–7.45)
PLATELET # BLD AUTO: 204 K/UL (ref 150–400)
PMV BLD AUTO: 12.2 FL (ref 8.9–12.9)
PO2 BLDA: 64 MMHG (ref 80–100)
PO2 BLDA: 84 MMHG (ref 80–100)
POTASSIUM SERPL-SCNC: 2.8 MMOL/L (ref 3.5–5.1)
PROT SERPL-MCNC: 6.9 G/DL (ref 6.4–8.2)
RBC # BLD AUTO: 4.44 M/UL (ref 3.8–5.2)
SAMPLES BEING HELD,HOLD: NORMAL
SAO2 % BLD: 93 % (ref 92–97)
SAO2 % BLD: 96 % (ref 92–97)
SAO2% DEVICE SAO2% SENSOR NAME: ABNORMAL
SAO2% DEVICE SAO2% SENSOR NAME: NORMAL
SODIUM SERPL-SCNC: 145 MMOL/L (ref 136–145)
SPECIMEN SITE: ABNORMAL
SPECIMEN SITE: NORMAL
TROPONIN I SERPL-MCNC: 0.05 NG/ML
TROPONIN I SERPL-MCNC: <0.05 NG/ML
VENTILATION MODE VENT: NORMAL
VT SETTING VENT: 500 ML
WBC # BLD AUTO: 9.5 K/UL (ref 3.6–11)

## 2018-10-06 PROCEDURE — 96365 THER/PROPH/DIAG IV INF INIT: CPT

## 2018-10-06 PROCEDURE — 80053 COMPREHEN METABOLIC PANEL: CPT | Performed by: EMERGENCY MEDICINE

## 2018-10-06 PROCEDURE — 74011000250 HC RX REV CODE- 250: Performed by: EMERGENCY MEDICINE

## 2018-10-06 PROCEDURE — 65660000000 HC RM CCU STEPDOWN

## 2018-10-06 PROCEDURE — 96375 TX/PRO/DX INJ NEW DRUG ADDON: CPT

## 2018-10-06 PROCEDURE — 5A1945Z RESPIRATORY VENTILATION, 24-96 CONSECUTIVE HOURS: ICD-10-PCS | Performed by: EMERGENCY MEDICINE

## 2018-10-06 PROCEDURE — 77030005538 HC CATH URETH FOL44 BARD -B

## 2018-10-06 PROCEDURE — 71045 X-RAY EXAM CHEST 1 VIEW: CPT

## 2018-10-06 PROCEDURE — 96376 TX/PRO/DX INJ SAME DRUG ADON: CPT

## 2018-10-06 PROCEDURE — 74011250636 HC RX REV CODE- 250/636: Performed by: EMERGENCY MEDICINE

## 2018-10-06 PROCEDURE — 99285 EMERGENCY DEPT VISIT HI MDM: CPT

## 2018-10-06 PROCEDURE — 36600 WITHDRAWAL OF ARTERIAL BLOOD: CPT | Performed by: INTERNAL MEDICINE

## 2018-10-06 PROCEDURE — 74011000250 HC RX REV CODE- 250: Performed by: INTERNAL MEDICINE

## 2018-10-06 PROCEDURE — 77030034850

## 2018-10-06 PROCEDURE — 36600 WITHDRAWAL OF ARTERIAL BLOOD: CPT | Performed by: EMERGENCY MEDICINE

## 2018-10-06 PROCEDURE — 74011000258 HC RX REV CODE- 258: Performed by: INTERNAL MEDICINE

## 2018-10-06 PROCEDURE — 94002 VENT MGMT INPAT INIT DAY: CPT

## 2018-10-06 PROCEDURE — 74011250636 HC RX REV CODE- 250/636: Performed by: INTERNAL MEDICINE

## 2018-10-06 PROCEDURE — 5A09357 ASSISTANCE WITH RESPIRATORY VENTILATION, LESS THAN 24 CONSECUTIVE HOURS, CONTINUOUS POSITIVE AIRWAY PRESSURE: ICD-10-PCS | Performed by: EMERGENCY MEDICINE

## 2018-10-06 PROCEDURE — 83880 ASSAY OF NATRIURETIC PEPTIDE: CPT | Performed by: EMERGENCY MEDICINE

## 2018-10-06 PROCEDURE — 36415 COLL VENOUS BLD VENIPUNCTURE: CPT | Performed by: EMERGENCY MEDICINE

## 2018-10-06 PROCEDURE — 82550 ASSAY OF CK (CPK): CPT | Performed by: EMERGENCY MEDICINE

## 2018-10-06 PROCEDURE — 51702 INSERT TEMP BLADDER CATH: CPT

## 2018-10-06 PROCEDURE — 75810000137 HC PLCMT CENT VENOUS CATH

## 2018-10-06 PROCEDURE — 77030008683 HC TU ET CUF COVD -A

## 2018-10-06 PROCEDURE — 02HV33Z INSERTION OF INFUSION DEVICE INTO SUPERIOR VENA CAVA, PERCUTANEOUS APPROACH: ICD-10-PCS | Performed by: INTERNAL MEDICINE

## 2018-10-06 PROCEDURE — 83735 ASSAY OF MAGNESIUM: CPT | Performed by: EMERGENCY MEDICINE

## 2018-10-06 PROCEDURE — 93005 ELECTROCARDIOGRAM TRACING: CPT

## 2018-10-06 PROCEDURE — 74011250637 HC RX REV CODE- 250/637: Performed by: EMERGENCY MEDICINE

## 2018-10-06 PROCEDURE — 77030008771 HC TU NG SALEM SUMP -A

## 2018-10-06 PROCEDURE — 0BH17EZ INSERTION OF ENDOTRACHEAL AIRWAY INTO TRACHEA, VIA NATURAL OR ARTIFICIAL OPENING: ICD-10-PCS | Performed by: EMERGENCY MEDICINE

## 2018-10-06 PROCEDURE — 82803 BLOOD GASES ANY COMBINATION: CPT | Performed by: INTERNAL MEDICINE

## 2018-10-06 PROCEDURE — 31500 INSERT EMERGENCY AIRWAY: CPT

## 2018-10-06 PROCEDURE — 84484 ASSAY OF TROPONIN QUANT: CPT | Performed by: EMERGENCY MEDICINE

## 2018-10-06 PROCEDURE — 74011250637 HC RX REV CODE- 250/637: Performed by: INTERNAL MEDICINE

## 2018-10-06 PROCEDURE — C1751 CATH, INF, PER/CENT/MIDLINE: HCPCS

## 2018-10-06 PROCEDURE — 85025 COMPLETE CBC W/AUTO DIFF WBC: CPT | Performed by: EMERGENCY MEDICINE

## 2018-10-06 RX ORDER — SODIUM CHLORIDE 0.9 % (FLUSH) 0.9 %
5-10 SYRINGE (ML) INJECTION EVERY 8 HOURS
Status: DISCONTINUED | OUTPATIENT
Start: 2018-10-06 | End: 2018-10-21 | Stop reason: SDUPTHER

## 2018-10-06 RX ORDER — BUMETANIDE 0.25 MG/ML
1 INJECTION INTRAMUSCULAR; INTRAVENOUS ONCE
Status: COMPLETED | OUTPATIENT
Start: 2018-10-06 | End: 2018-10-06

## 2018-10-06 RX ORDER — DABIGATRAN ETEXILATE 150 MG/1
300 CAPSULE ORAL DAILY
Status: DISCONTINUED | OUTPATIENT
Start: 2018-10-07 | End: 2018-10-06

## 2018-10-06 RX ORDER — METOPROLOL SUCCINATE 50 MG/1
100 TABLET, EXTENDED RELEASE ORAL DAILY
Status: DISCONTINUED | OUTPATIENT
Start: 2018-10-06 | End: 2018-10-07

## 2018-10-06 RX ORDER — UREA 10 %
13.5 LOTION (ML) TOPICAL DAILY
Status: DISCONTINUED | OUTPATIENT
Start: 2018-10-07 | End: 2018-10-07

## 2018-10-06 RX ORDER — DABIGATRAN ETEXILATE 150 MG/1
150 CAPSULE ORAL 2 TIMES DAILY
Status: DISCONTINUED | OUTPATIENT
Start: 2018-10-06 | End: 2018-10-06

## 2018-10-06 RX ORDER — MUPIROCIN 20 MG/G
OINTMENT TOPICAL 2 TIMES DAILY
Status: COMPLETED | OUTPATIENT
Start: 2018-10-07 | End: 2018-10-11

## 2018-10-06 RX ORDER — SODIUM CHLORIDE 0.9 % (FLUSH) 0.9 %
5-10 SYRINGE (ML) INJECTION AS NEEDED
Status: DISCONTINUED | OUTPATIENT
Start: 2018-10-06 | End: 2018-10-22 | Stop reason: HOSPADM

## 2018-10-06 RX ORDER — METOPROLOL SUCCINATE 50 MG/1
150 TABLET, EXTENDED RELEASE ORAL DAILY
Status: DISCONTINUED | OUTPATIENT
Start: 2018-10-07 | End: 2018-10-06

## 2018-10-06 RX ORDER — PREGABALIN 200 MG/1
300 CAPSULE ORAL 2 TIMES DAILY
Status: ON HOLD | COMMUNITY
End: 2020-12-16 | Stop reason: CLARIF

## 2018-10-06 RX ORDER — BUMETANIDE 0.25 MG/ML
2 INJECTION INTRAMUSCULAR; INTRAVENOUS 2 TIMES DAILY
Status: DISCONTINUED | OUTPATIENT
Start: 2018-10-06 | End: 2018-10-06

## 2018-10-06 RX ORDER — VENLAFAXINE 75 MG/1
300 TABLET ORAL DAILY
Status: ON HOLD | COMMUNITY
End: 2018-10-08 | Stop reason: DRUGHIGH

## 2018-10-06 RX ORDER — POTASSIUM CHLORIDE 7.45 MG/ML
10 INJECTION INTRAVENOUS
Status: COMPLETED | OUTPATIENT
Start: 2018-10-06 | End: 2018-10-06

## 2018-10-06 RX ORDER — DABIGATRAN ETEXILATE 150 MG/1
150 CAPSULE ORAL 2 TIMES DAILY
Status: ON HOLD | COMMUNITY
End: 2019-02-22 | Stop reason: SDUPTHER

## 2018-10-06 RX ORDER — VENLAFAXINE 37.5 MG/1
300 TABLET ORAL DAILY
Status: DISCONTINUED | OUTPATIENT
Start: 2018-10-07 | End: 2018-10-07

## 2018-10-06 RX ORDER — ACETAMINOPHEN 325 MG/1
650 TABLET ORAL
Status: DISCONTINUED | OUTPATIENT
Start: 2018-10-06 | End: 2018-10-22 | Stop reason: HOSPADM

## 2018-10-06 RX ORDER — BUMETANIDE 0.25 MG/ML
1 INJECTION INTRAMUSCULAR; INTRAVENOUS
Status: COMPLETED | OUTPATIENT
Start: 2018-10-06 | End: 2018-10-06

## 2018-10-06 RX ORDER — METOPROLOL TARTRATE 5 MG/5ML
5 INJECTION INTRAVENOUS ONCE
Status: COMPLETED | OUTPATIENT
Start: 2018-10-06 | End: 2018-10-06

## 2018-10-06 RX ORDER — PROPOFOL 10 MG/ML
0-50 VIAL (ML) INTRAVENOUS
Status: DISCONTINUED | OUTPATIENT
Start: 2018-10-06 | End: 2018-10-10

## 2018-10-06 RX ORDER — GUAIFENESIN 100 MG/5ML
81 LIQUID (ML) ORAL DAILY
Status: DISCONTINUED | OUTPATIENT
Start: 2018-10-07 | End: 2018-10-22 | Stop reason: HOSPADM

## 2018-10-06 RX ORDER — ROCURONIUM BROMIDE 10 MG/ML
100 INJECTION, SOLUTION INTRAVENOUS
Status: COMPLETED | OUTPATIENT
Start: 2018-10-06 | End: 2018-10-06

## 2018-10-06 RX ORDER — BUMETANIDE 0.25 MG/ML
2 INJECTION INTRAMUSCULAR; INTRAVENOUS EVERY 12 HOURS
Status: DISCONTINUED | OUTPATIENT
Start: 2018-10-07 | End: 2018-10-12

## 2018-10-06 RX ORDER — CLONAZEPAM 1 MG/1
0.5 TABLET ORAL DAILY PRN
Status: DISCONTINUED | OUTPATIENT
Start: 2018-10-06 | End: 2018-10-11

## 2018-10-06 RX ORDER — BUMETANIDE 0.25 MG/ML
1 INJECTION INTRAMUSCULAR; INTRAVENOUS 2 TIMES DAILY
Status: DISCONTINUED | OUTPATIENT
Start: 2018-10-06 | End: 2018-10-06

## 2018-10-06 RX ORDER — NOREPINEPHRINE BITARTRATE/D5W 8 MG/250ML
2-16 PLASTIC BAG, INJECTION (ML) INTRAVENOUS
Status: DISCONTINUED | OUTPATIENT
Start: 2018-10-06 | End: 2018-10-08

## 2018-10-06 RX ORDER — METOPROLOL SUCCINATE 100 MG/1
100 TABLET, EXTENDED RELEASE ORAL DAILY
Status: ON HOLD | COMMUNITY
End: 2018-10-20 | Stop reason: SDUPTHER

## 2018-10-06 RX ORDER — CHLORHEXIDINE GLUCONATE 1.2 MG/ML
15 RINSE ORAL 2 TIMES DAILY
Status: DISCONTINUED | OUTPATIENT
Start: 2018-10-06 | End: 2018-10-11

## 2018-10-06 RX ORDER — PREGABALIN 100 MG/1
400 CAPSULE ORAL 2 TIMES DAILY
Status: DISCONTINUED | OUTPATIENT
Start: 2018-10-06 | End: 2018-10-07

## 2018-10-06 RX ORDER — POTASSIUM CHLORIDE 1.5 G/1.77G
40 POWDER, FOR SOLUTION ORAL
Status: COMPLETED | OUTPATIENT
Start: 2018-10-06 | End: 2018-10-06

## 2018-10-06 RX ORDER — ATORVASTATIN CALCIUM 40 MG/1
40 TABLET, FILM COATED ORAL
Status: DISCONTINUED | OUTPATIENT
Start: 2018-10-06 | End: 2018-10-22 | Stop reason: HOSPADM

## 2018-10-06 RX ORDER — DOCUSATE SODIUM 100 MG/1
100 CAPSULE, LIQUID FILLED ORAL
Status: DISCONTINUED | OUTPATIENT
Start: 2018-10-06 | End: 2018-10-22 | Stop reason: HOSPADM

## 2018-10-06 RX ORDER — GLUCOSAMINE SULFATE 1500 MG
1000 POWDER IN PACKET (EA) ORAL DAILY
COMMUNITY

## 2018-10-06 RX ORDER — METOPROLOL SUCCINATE 50 MG/1
100 TABLET, EXTENDED RELEASE ORAL DAILY
Status: DISCONTINUED | OUTPATIENT
Start: 2018-10-07 | End: 2018-10-06

## 2018-10-06 RX ORDER — LISINOPRIL 20 MG/1
20 TABLET ORAL DAILY
Status: DISCONTINUED | OUTPATIENT
Start: 2018-10-07 | End: 2018-10-06

## 2018-10-06 RX ORDER — METOPROLOL TARTRATE 5 MG/5ML
2.5 INJECTION INTRAVENOUS
Status: COMPLETED | OUTPATIENT
Start: 2018-10-06 | End: 2018-10-06

## 2018-10-06 RX ORDER — ENOXAPARIN SODIUM 150 MG/ML
105 INJECTION SUBCUTANEOUS EVERY 12 HOURS
Status: DISCONTINUED | OUTPATIENT
Start: 2018-10-06 | End: 2018-10-08 | Stop reason: RX

## 2018-10-06 RX ORDER — AMLODIPINE BESYLATE 5 MG/1
5 TABLET ORAL DAILY
Status: DISCONTINUED | OUTPATIENT
Start: 2018-10-07 | End: 2018-10-06

## 2018-10-06 RX ORDER — ETOMIDATE 2 MG/ML
20 INJECTION INTRAVENOUS
Status: COMPLETED | OUTPATIENT
Start: 2018-10-06 | End: 2018-10-06

## 2018-10-06 RX ADMIN — AMIODARONE HYDROCHLORIDE 1 MG/MIN: 50 INJECTION, SOLUTION INTRAVENOUS at 17:56

## 2018-10-06 RX ADMIN — ETOMIDATE 20 MG: 40 INJECTION, SOLUTION INTRAVENOUS at 16:09

## 2018-10-06 RX ADMIN — Medication 10 ML: at 18:52

## 2018-10-06 RX ADMIN — POTASSIUM CHLORIDE 10 MEQ: 10 INJECTION, SOLUTION INTRAVENOUS at 13:01

## 2018-10-06 RX ADMIN — PROPOFOL 20 MCG/KG/MIN: 10 INJECTION, EMULSION INTRAVENOUS at 17:48

## 2018-10-06 RX ADMIN — Medication 10 ML: at 21:59

## 2018-10-06 RX ADMIN — POTASSIUM CHLORIDE 10 MEQ: 10 INJECTION, SOLUTION INTRAVENOUS at 14:13

## 2018-10-06 RX ADMIN — Medication 10 ML: at 16:12

## 2018-10-06 RX ADMIN — NITROGLYCERIN 1 INCH: 20 OINTMENT TOPICAL at 11:44

## 2018-10-06 RX ADMIN — BUMETANIDE 1 MG: 0.25 INJECTION, SOLUTION INTRAMUSCULAR; INTRAVENOUS at 15:45

## 2018-10-06 RX ADMIN — PROPOFOL 20 MCG/KG/MIN: 10 INJECTION, EMULSION INTRAVENOUS at 21:57

## 2018-10-06 RX ADMIN — BUMETANIDE 1 MG: 0.25 INJECTION INTRAMUSCULAR; INTRAVENOUS at 13:01

## 2018-10-06 RX ADMIN — POTASSIUM CHLORIDE 40 MEQ: 1.5 POWDER, FOR SOLUTION ORAL at 11:55

## 2018-10-06 RX ADMIN — BUMETANIDE 1 MG: 0.25 INJECTION INTRAMUSCULAR; INTRAVENOUS at 17:00

## 2018-10-06 RX ADMIN — METOPROLOL TARTRATE 2.5 MG: 1 INJECTION, SOLUTION INTRAVENOUS at 11:44

## 2018-10-06 RX ADMIN — POTASSIUM CHLORIDE 10 MEQ: 10 INJECTION, SOLUTION INTRAVENOUS at 11:44

## 2018-10-06 RX ADMIN — CHLORHEXIDINE GLUCONATE 15 ML: 1.2 RINSE ORAL at 21:58

## 2018-10-06 RX ADMIN — METOPROLOL SUCCINATE 100 MG: 50 TABLET, EXTENDED RELEASE ORAL at 13:47

## 2018-10-06 RX ADMIN — AMIODARONE HYDROCHLORIDE 150 MG: 50 INJECTION, SOLUTION INTRAVENOUS at 17:47

## 2018-10-06 RX ADMIN — ENOXAPARIN SODIUM 105 MG: 120 INJECTION SUBCUTANEOUS at 17:53

## 2018-10-06 RX ADMIN — Medication 2 MCG/MIN: at 20:11

## 2018-10-06 RX ADMIN — ROCURONIUM BROMIDE 100 MG: 50 INJECTION, SOLUTION INTRAVENOUS at 16:09

## 2018-10-06 RX ADMIN — METOPROLOL TARTRATE 5 MG: 1 INJECTION, SOLUTION INTRAVENOUS at 10:45

## 2018-10-06 NOTE — ED NOTES
Assumed care of patient. Patient placed in position of comfort. Call bell in reach. Skin warm and dry. Respirations even and unlabored. In no apparent distress at this time. Pt presents to the ED via EMS for c/o SOB, non-productive cough, diaphoresis, dizziness and Lt sided CP x last night. Per EMS, SpO2 with the pt using her home bipap, was in the low 80's; up to 88% with bipap. A&O x 4. Cardiac monitor x 3. Family at bedside.

## 2018-10-06 NOTE — ED NOTES
TRANSFER - OUT REPORT: 
 
Verbal report given to Lawrence Brooke RN on Nina Hernandez  being transferred to [address] [place] [address] for routine progression of care Report consisted of patients Situation, Background, Assessment and  
Recommendations(SBAR). Information from the following report(s) SBAR, ED Summary and Intake/Output was reviewed with the receiving nurse. Lines:  
Triple Lumen 10/06/18 Right Internal jugular (Active) Peripheral IV 10/06/18 Right Antecubital (Active) Site Assessment Clean, dry, & intact 10/6/2018 10:11 AM  
Phlebitis Assessment 0 10/6/2018 10:11 AM  
Infiltration Assessment 0 10/6/2018 10:11 AM  
Dressing Status Clean, dry, & intact 10/6/2018 10:11 AM  
Dressing Type Transparent 10/6/2018 10:11 AM  
Hub Color/Line Status Patent 10/6/2018 10:11 AM  
   
Peripheral IV 10/06/18 Left (Active) Site Assessment Clean, dry, & intact 10/6/2018  5:05 PM  
Phlebitis Assessment 0 10/6/2018  5:05 PM  
Infiltration Assessment 0 10/6/2018  5:05 PM  
Dressing Status Clean, dry, & intact 10/6/2018  5:05 PM  
Dressing Type Transparent 10/6/2018  5:05 PM  
Hub Color/Line Status Patent 10/6/2018  5:05 PM  
  
 
Opportunity for questions and clarification was provided.    
 
Patient transported with: 
 Monitor, RT, RN

## 2018-10-06 NOTE — PROGRESS NOTES
1900  Received bedside/verbal report and assumed care of patient from Roe Horta RN. Drips verified: Amio at 1 mg/min and Propofol at 15 mcg/kg/min. 1905  BP 68/50, sedation decreased, but BP remains low. Pulmonary paged for consult. Shahana Cole returned page. Notified of ABG results and vent settings, no changes. Received orders for restraints and Levophed. 2000  Shift assessment completed. See doc flowsheet for details. Primary Nurse Chad Canchola RN and Kameron Miller RN performed a dual skin assessment on this patient. No impairment noted. Preet score is 13. 
 
0000  Amiodarone decreased to 0.5 mg/min per protocol order. 5666 formerly [facility] with  regarding critical pH 7.56. Received orders to decrease rate to 14 and tidal volume to 450. 
 
0445  Lab called critical Potassium value of 2.7. Attending paged. Lynda Kirkpatrick returned page. Received orders to replete K. 
 
0529  Propofol stopped for SAT. 7790   at bedside. Updated on patient's condition. 1437  Patient immediately became agitated, gagging violently on the ET tube, attempting to sit up. Verbal redirection unsuccessful. Propofol restarted at half dose per propofol. 0630  Propofol titrated up to achieve RASS -2 and promote adequate sedation and ventilation. SAT trial failed due to agitation. RT notified. 0715  Bedside and Verbal shift change report given to Raad Petersen (oncoming nurse) by Zenon Knapp (offgoing nurse). Report included the following information SBAR, Kardex, ED Summary, Intake/Output, MAR, Recent Results and Cardiac Rhythm Afib. Drips verified: Amio at 0.5 mg/min and Propofol at 25 mcg/kg/min.

## 2018-10-06 NOTE — PROGRESS NOTES
Called to see patient. Patient is respiratory distress Sat 92% in 8LNC,  
/100   RR40 HEENT: VIVEK, EOMI, lip cyanosis. Heart: Irregular, Tachycardic, in A. Fib Lungs: Poor air entry, using accessory muscles. Abdomen: soft, no distended, no tender, BS present. Extremities: pulses present, + edema. Neuro: GCS [id number] Diagnosis: Acute Respiratory Failure A. Fib RVR. Acute Congestive Heart Failure. Plan:  
Proceed with sedation and intubation Transfer to ICU Start Amiodarone  drip C/W Diuresis. CXR and ABG on AM 
 
Spoke with family and updated them Dr. Naomi Becerra Hospitalist 
Critical Care time 42 min Time in 3:40 Time out: 4:22PM

## 2018-10-06 NOTE — ED NOTES
Spoke with Dr. Divine Mclaughlin regarding pt's unchanged tachycardia (a-fib RVR), per Dr. Noble Peers request. Per Dr. Divine Mclaughlin, no new interventions needed at this time; Dr. Heena Rousseau notified. Otherwise, vital signs remained unchanged and stable. No acute distress noted. Will call IP nurse back and give her report.

## 2018-10-06 NOTE — ED NOTES
[facility] charge nurse is speaking with nursing supervisor about possibly transferring the pt to stepdown. Will hold off on report at this time.

## 2018-10-06 NOTE — PROCEDURES
Procedure: Central Line  Physician(s): Dr. Kenny Tellez  Indication: Acute hypoxic respiratory failure  Anesthesia: 1% Lidocaine    A time-out was completed, verifying correct patient, procedure, site, positioning, and implant(s) or special equipment if applicable. Patients     right      IJ   area was prepped and draped in usual sterile fashion. 1% Lidocaine was used to anesthetize the area. A Cordis/Triple lumen central line was introduced over a wire via the Seldinger technique, then sutured in place. Good blood flow was noted from all ports. The patient tolerated the procedure well. Chest x-ray was ordered to assess for pneumothorax and catheter placement.   Complications: None  Blood loss: Minimal

## 2018-10-06 NOTE — ED PROVIDER NOTES
EMERGENCY DEPARTMENT HISTORY AND PHYSICAL EXAM 
 
 
Date: 10/6/2018 Patient Name: Cinthia Felty History of Presenting Illness Chief Complaint Patient presents with  Shortness of Breath The patient presents to the ED with complaints of shortness of breath that started last night, patient on CPAP upon arrival.   
 
History Provided By: Patient and EMS 
 
HPI: Cinthia Felty, 79 y.o. female with PMHx significant for bronchitis, CHF, A-fib, HTN, T2 DM, anxiety, and asthma, presents herself via EMS to the ED with cc of worsening SOB x last night (10/5/18). Pt reports associated pressuring non-radiating left sided CP, palpitation, and nonproductive cough since onset of pain. She notes of being short of breath while on her bed wearing at home bipap. Per EMS, pt's SpO2 was 88% on arrival and en route, her SpO2 did not improve while on non-rebreather. Pt notes that her CP resolved PTA and has not taken her BP medicine this morning (9/6/18). Pt specifically denies fever, chills, increased swelling of legs, pleuritic pain, and jaw pain. There are no other complaints, changes, or physical findings at this time. PCP: Paz Soto MD  
Cardiology: Dr. Stephanie Fernandez Current Facility-Administered Medications Medication Dose Route Frequency Provider Last Rate Last Dose  metoprolol (LOPRESSOR) injection 2.5 mg  2.5 mg IntraVENous NOW Agusto Fozia. Kj Sharma MD      
 potassium chloride (KAON 10%) 20 mEq/15 mL oral liquid 40 mEq  40 mEq Oral NOW Agusto FoziaMarlene Sharma MD      
 nitroglycerin (NITROBID) 2 % ointment 1 Inch  1 Inch Topical NOW Agusto FoziaMarlene Sharma MD      
 potassium chloride 10 mEq in 100 ml IVPB  10 mEq IntraVENous Q1H Agusto FoziaMarlene Sharma MD      
 
Current Outpatient Prescriptions Medication Sig Dispense Refill  bumetanide (BUMEX) 1 mg tablet TAKE 1 TABLET IN THE MORNING ON TUESDAY & THURSDAY AND 2 TABLETS IN THE MORNING ON MONDAY, WEDNESDAY AND FRIDAY 103 Tab 3  
  metoprolol succinate (TOPROL XL) 50 mg XL tablet Take 1.5 Tabs by mouth daily. 45 Tab 0  
 metoprolol succinate (TOPROL-XL) 50 mg XL tablet Take 1.5 Tabs by mouth daily. 45 Tab 0  
 lidocaine (XYLOCAINE) 2 % solution Take 10 mL by mouth as needed for Pain. 1 Bottle 0  
 omeprazole (PRILOSEC) 40 mg capsule Take 1 Cap by mouth daily. 30 Cap 0  predniSONE (DELTASONE) 5 mg tablet 5 tablets day for days 1 through 4, then 4 tablets on day 5, then 3 tablets on day 6, then 2 tablets on day 7, then 1 tablet on day 8. 30 Tab 0  
 azithromycin (ZITHROMAX) 250 mg tablet Take 1 Tab by mouth See Admin Instructions. 6 Tab 0  clonazePAM (KLONOPIN) 0.5 mg tablet TAKE 1 TABLET EVERY NIGHT. MAX DAILY DOSE OF 0.5MG. 90 Tab 1  
 amLODIPine (NORVASC) 5 mg tablet TAKE 1 TABLET EVERY DAY 90 Tab 3  potassium gluconate 550 mg (90 mg) tab Take  by mouth.  magnesium 250 mg tab Take  by mouth.  conjugated estrogens (PREMARIN) 0.625 mg/gram vaginal cream Insert 0.5 g into vagina daily.  calcium-cholecalciferol, d3, (CALCIUM 600 + D) 600-125 mg-unit tab Take  by mouth.  LYRICA 100 mg capsule TAKE ONE CAPSULE BY MOUTH 3 TIMES A DAY (Patient taking differently: 200mg 2 tabs daily) 90 Cap 2  
 venlafaxine (EFFEXOR) 75 mg tablet TAKE 2 TABLETS TWICE A  Tab 3  
 apixaban (ELIQUIS) 5 mg tablet Take 1 Tab by mouth two (2) times a day. 60 Tab 11  
 aspirin 81 mg chewable tablet Take 81 mg by mouth daily.  lisinopril (PRINIVIL, ZESTRIL) 20 mg tablet Take 1 Tab by mouth daily. 30 Tab 12  
 atorvastatin (LIPITOR) 40 mg tablet Take 1 Tab by mouth nightly. 30 Tab 12  Cholecalciferol, Vitamin D3, (VITAMIN D3) 1,000 unit cap Take  by mouth. Past History Past Medical History: 
Past Medical History:  
Diagnosis Date  Anxiety 1/22/2018  Arthritis OA  Asthma  Diabetes ([address]  Essential hypertension  GERD (gastroesophageal reflux disease)  Hypercholesterolemia 1/22/2018  Hypertension  Long-term use of high-risk medication 1/22/2018  Neuropathy  Other ill-defined conditions(799.89) IBS, spinal stenosis  Plantar fasciitis  Psychiatric disorder   
 depression, anxiety  Sleep apnea   
 uses CPAP  Type 2 diabetes mellitus with diabetic neuropathy, without long-term current use of insulin ([facility] [address] 6/5/2016 Past Surgical History: 
Past Surgical History:  
Procedure Laterality Date  CARDIAC SURG PROCEDURE UNLIST    
 stent  COLONOSCOPY N/A 3/14/2018 COLONOSCOPY performed by Donna Beckman MD at [facility]  HX HYSTERECTOMY  HX PACEMAKER Family History: 
Family History Problem Relation Age of Onset 24 [facility] Arthritis-osteo Mother  Hypertension Mother  High Cholesterol Mother  Crohn's Disease Mother  Heart Disease Mother  Alcohol abuse Father  High Cholesterol Sister  Hypertension Sister  Thyroid Disease Sister  COPD Sister  High Cholesterol Brother  Hypertension Brother  COPD Brother  COPD Child  Inflammatory Bowel Dz Child Social History: 
Social History Substance Use Topics  Smoking status: Never Smoker  Smokeless tobacco: Never Used  Alcohol use No  
 
 
Allergies: Allergies Allergen Reactions  Sulfa (Sulfonamide Antibiotics) Swelling  Amoxicillin Swelling Review of Systems Review of Systems Constitutional: Negative for chills and fever. HENT: Negative for congestion.   
     -jaw pain Eyes: Negative for visual disturbance. Respiratory: Positive for cough (nonproductive) and shortness of breath. Negative for chest tightness. Cardiovascular: Positive for chest pain (left sided) and palpitations. Negative for leg swelling (increased). Gastrointestinal: Negative for abdominal pain and vomiting. Endocrine: Negative for polyuria. Genitourinary: Negative for dysuria and frequency. Musculoskeletal: Negative for myalgias. Skin: Negative for color change. Allergic/Immunologic: Negative for immunocompromised state. Neurological: Negative for numbness. Physical Exam  
Physical Exam  
 
Nursing note and vitals reviewed. General appearance:  mild to moderate respiratory distress Eyes: PERRL, EOMI, conjunctiva normal, anicteric sclera HEENT: mucous membranes tacky, oropharynx is clear Pulmonary: crackles to bilateral bases w/ no obvious wheeze, tachypneic, speaks in near complete sentences Cardiac: tachycardia and IRIR, no murmurs, gallops, or rubs, 2+DP pulses, 2+ radial pulses, pacemaker to left chest wall Abdomen: soft, nontender, nondistended, bowel sounds present MSK: 2+ chronic lower leg edema Neuro: Alert, answers questions appropriately Skin: capillary refill brisk Diagnostic Study Results Labs - Recent Results (from the past 12 hour(s)) EKG, 12 LEAD, INITIAL Collection Time: 10/06/18 10:14 AM  
Result Value Ref Range Ventricular Rate 134 BPM  
 Atrial Rate 127 BPM  
 QRS Duration 84 ms Q-T Interval 350 ms QTC Calculation (Bezet) 522 ms Calculated R Axis 14 degrees Calculated T Axis -69 degrees Diagnosis Atrial fibrillation with rapid ventricular response Septal infarct , age undetermined ST & T wave abnormality, consider inferior ischemia When compared with ECG of 10-SEP-2018 15:43, 
Septal infarct is now present Inverted T waves have replaced nonspecific T wave abnormality in Inferior  
leads CBC WITH AUTOMATED DIFF Collection Time: 10/06/18 10:26 AM  
Result Value Ref Range WBC 9.5 3.6 - 11.0 K/uL  
 RBC 4.44 3.80 - 5.20 M/uL  
 HGB 12.9 11.5 - 16.0 g/dL HCT 39.7 35.0 - 47.0 % MCV 89.4 80.0 - 99.0 FL  
 MCH 29.1 26.0 - 34.0 PG  
 MCHC 32.5 30.0 - 36.5 g/dL  
 RDW 18.6 (H) 11.5 - 14.5 % PLATELET 154 428 - 558 K/uL MPV 12.2 8.9 - 12.9 FL  
 NRBC 0.0 0  WBC ABSOLUTE NRBC 0.00 0.00 - 0.01 K/uL NEUTROPHILS 72 32 - 75 % LYMPHOCYTES 19 12 - 49 % MONOCYTES 6 5 - 13 % EOSINOPHILS 2 0 - 7 % BASOPHILS 0 0 - 1 % IMMATURE GRANULOCYTES 1 (H) 0.0 - 0.5 % ABS. NEUTROPHILS 6.8 1.8 - 8.0 K/UL  
 ABS. LYMPHOCYTES 1.8 0.8 - 3.5 K/UL  
 ABS. MONOCYTES 0.6 0.0 - 1.0 K/UL  
 ABS. EOSINOPHILS 0.2 0.0 - 0.4 K/UL  
 ABS. BASOPHILS 0.0 0.0 - 0.1 K/UL  
 ABS. IMM. GRANS. 0.1 (H) 0.00 - 0.04 K/UL  
 DF AUTOMATED METABOLIC PANEL, COMPREHENSIVE Collection Time: 10/06/18 10:26 AM  
Result Value Ref Range Sodium 145 136 - 145 mmol/L Potassium 2.8 (L) 3.5 - 5.1 mmol/L Chloride 108 97 - 108 mmol/L  
 CO2 28 21 - 32 mmol/L Anion gap 9 5 - 15 mmol/L Glucose 140 (H) 65 - 100 mg/dL BUN 22 (H) 6 - 20 MG/DL Creatinine 1.35 (H) 0.55 - 1.02 MG/DL  
 BUN/Creatinine ratio 16 12 - 20 GFR est AA 48 (L) >60 ml/min/1.73m2 GFR est non-AA 39 (L) >60 ml/min/1.73m2 Calcium 8.4 (L) 8.5 - 10.1 MG/DL Bilirubin, total 1.0 0.2 - 1.0 MG/DL  
 ALT (SGPT) 20 12 - 78 U/L  
 AST (SGOT) 17 15 - 37 U/L Alk. phosphatase 127 (H) 45 - 117 U/L Protein, total 6.9 6.4 - 8.2 g/dL Albumin 3.3 (L) 3.5 - 5.0 g/dL Globulin 3.6 2.0 - 4.0 g/dL A-G Ratio 0.9 (L) 1.1 - 2.2 CK W/ REFLX CKMB Collection Time: 10/06/18 10:26 AM  
Result Value Ref Range CK 59 26 - 192 U/L  
TROPONIN I Collection Time: 10/06/18 10:26 AM  
Result Value Ref Range Troponin-I, Qt. <0.05 <0.05 ng/mL SAMPLES BEING HELD Collection Time: 10/06/18 10:26 AM  
Result Value Ref Range SAMPLES BEING HELD BLUE TOP   
 COMMENT Add-on orders for these samples will be processed based on acceptable specimen integrity and analyte stability, which may vary by analyte. NT-PRO BNP Collection Time: 10/06/18 10:26 AM  
Result Value Ref Range NT pro-BNP 6678 (H) 0 - 125 PG/ML Radiologic Studies - CXR Results  (Last 48 hours) 10/06/18 1036  XR CHEST PORT Final result Impression:  IMPRESSION:   
   
Development of moderate CHF. Narrative:  EXAM:  XR CHEST PORT INDICATION:  Shortness of breath COMPARISON:  9/10/2018 FINDINGS:  
   
A portable AP radiograph of the chest was obtained at 10:29 hours. The patient  
is on a cardiac monitor. There is now bilateral perihilar haziness and  
bibasilar interstitial change as well as small bilateral pleural effusions. Findings are consistent with moderate CHF. The mildly enlarged  
cardiomediastinal silhouette demonstrates no significant change. A dual-chamber  
pacer device is noted. The bones and soft tissues are unremarkable. Medical Decision Making I am the first provider for this patient. I reviewed the vital signs, available nursing notes, past medical history, past surgical history, family history and social history. Vital Signs-Reviewed the patient's vital signs. Patient Vitals for the past 12 hrs: 
 Temp Pulse Resp BP SpO2  
10/06/18 1053 - (!) 117 26 - 93 % 10/06/18 1030 - (!) 134 22 (!) 148/109 93 % 10/06/18 1021 - (!) 133 29 (!) 139/107 (!) 87 % 10/06/18 1012 98.3 °F (36.8 °C) (!) 146 25 (!) 139/107 (!) 88 % Pulse Oximetry Analysis - 88% on 0L EKG interpretation: (Preliminary) 10:14 Rhythm: atrial fibrillation with rapid ventricular response; and irregular . Rate (approx.): 134; Axis: normal; ST/T wave: no ST elevation, no ST depression, lateral rate related ST changes; QRS 84 ms,  ms, QTc 522 ms. Written by ABRAM Emersonibchris, as dictated by Yenny Alfredo MD. Records Reviewed: Nursing Notes, Old Medical Records, Previous Radiology Studies and Previous Laboratory Studies Provider Notes (Medical Decision Making): DDx: PAF, PNA, pleural effusion, pulmonary edema, ACS, lower suspicion for PE given anti-coagulants ED Course:  
Initial assessment performed.  The patients presenting problems have been discussed, and they are in agreement with the care plan formulated and outlined with them. I have encouraged them to ask questions as they arise throughout their visit. PROGRESS NOTE:  
11:00 AM 
Pt has been re-examined by Zak Araujo MD. Pt's HR is in the 110's (some improvement w Lopressor). Will re-dose with IV metoprolol. 11:17 AM 
X-ray shows ADHF. Start nitropaste, electrolytes low, will replace K prior to diuresis. Consult cardiology and will admit to to hospitalist. EF 45% on last available echo records. 11:23 AM 
Pt and family are updated on plan of care including plan for admission. They expressed their understanding and agrees with the plan. Consult Note: 
11:24 AM 
Zak Araujo MD spoke with Dr. Harjinder Mota, Specialty: Cardiology Discussed pt's hx, disposition, and available diagnostic and imaging results. Reviewed care plans. Consultant agrees with plans as outlined. Recommends admission to hospitalist.  
 
Consult Note: 
11:42 AM 
Zak Araujo MD spoke with Dr. Saray Menjivar, Specialty: Hospitalist 
Discussed pt's hx, disposition, and available diagnostic and imaging results. Reviewed care plans. Consultant agrees with plans as outlined. Will admit pt. Procedure Note - Orotracheal Intubation:  
4:02 PM 
Performed by: Julissa Jordan DO Indication for procedure: impending respiratory failure RSI performed. The patient was sedated with 20 mg of etomidate and 100 mg rocuronium (Zemuron) and orotracheally intubated with a 7.5 cuffed [place]e endotracheal tube using a MAC 4 blade with direct visualization. Tube was advanced to 24 cm at the lip. ETT location confirmed by bilateral, symmetric breath sounds, good end-tidal CO2 detector color change , no breath sounds over stomach and chest x-ray visualization. Number of attempts: 1 Complications: none RSI was used. The procedure took 1-15 minutes, and pt tolerated well.  
 
 
CRITICAL CARE NOTE : 
4:40 PM 
 
 IMPENDING DETERIORATION -Airway, Respiratory, Cardiovascular and Metabolic ASSOCIATED RISK FACTORS - Hypoxia, Dysrhythmia and Metabolic changes MANAGEMENT- Bedside Assessment and Supervision of Care INTERPRETATION -  Xrays, ECG, Blood Pressure and Cardiac Output Measures INTERVENTIONS - hemodynamic mngmt/chemical rate control and Metobolic interventions CASE REVIEW - Hospitalist, Medical Sub-Specialist, Nursing and Family TREATMENT RESPONSE -Unchanged PERFORMED BY - Self NOTES: 
I have spent 45 minutes of critical care time involved in lab review, consultations with specialist, family decision- making, bedside attention and documentation. During this entire length of time I was immediately available to the patient . Carmen Burr MD 
 
 
Admit Note: 
11:43 AM 
Pt is being admitted by Dr. Mary Alice Epps. The results of their tests and reason(s) for their admission have been discussed with pt and/or available family. They convey agreement and understanding for the need to be admitted and for admission diagnosis. PLAN: 
1. Will admit pt to Hospitalist.  
 
Diagnosis Clinical Impression: 1. Atrial fibrillation with rapid ventricular response ([address] 2. Acute congestive heart failure, unspecified heart failure type ([address] Attestations: This note is prepared by [employer], acting as Scribe for MD Carmen Courtney MD: The scribe's documentation has been prepared under my direction and personally reviewed by me in its entirety. I confirm that the note above accurately reflects all work, treatment, procedures, and medical decision making performed by me. This note will not be viewable in [address]

## 2018-10-06 NOTE — PROGRESS NOTES
Events noted Acute respiratory decompensation Afib w/ HR 190s Emergently intubated in the ER 
AFib remains in 120s Post-intubation CXR w/ white out both lungs Amio for rate control I cancelled the ordered diltiazem Additional bumex now Stop metoprolol Stop pradaxa Start lovenox Bedside limited echo performed by me w/ EF 15-20% BPs ok now May need inotropes Central line needed for above and CVP monitoring I will place the central line given debates in the ER 
 
60 minutes of critical care time spent in discussion with ER doctors, hospitalist, family, and nursing staff to include medical decision making, drip management, vent management, etc.

## 2018-10-06 NOTE — PROGRESS NOTES
TRANSFER - IN REPORT: 
 
Verbal report received from BEVERLY Jeffers RN(name) on Anamika Peterson  being received from ED(unit) for routine progression of care Report consisted of patients Situation, Background, Assessment and  
Recommendations(SBAR). Information from the following report(s) SBAR, Kardex, ED Summary, MAR, Med Rec Status and Cardiac Rhythm afib was reviewed with the receiving nurse. Opportunity for questions and clarification was provided. Assessment completed upon patients arrival to unit and care assumed. Arrived via stretcher, placed into bed and CCU monitoring initiated. Patient moving hands towards ETT, gagging at times with moderate amount of clear oral secretions noted. VS obtained, Chlorhex bath given. Diprivan adjusted to help patient become more comfortable. Heart rate in 100-115 range. Sats 97%. 1915  Report given to JERSEY Orellana RN. Pulmonologist on call paged to alert to new intubation and receive orders. BP down at 1900, Diprivan adjusted and will monitor closely while waiting on return call. 1924  Diprivan up to 15 mcs due to trying to pull at ETT. SBP 95.

## 2018-10-06 NOTE — ED NOTES
1530: Pt family called this RN into room due to pt feeling worse. Pt complaining of chest pressure and appears diaphoretic, but not dyspneic or tachypneic. -140's and pt remains in a fib. O2 sat mid 90's. Pt speaking in full sentences. Notified primary RN. Primary RN speaking with MD and obtaining EKG.

## 2018-10-06 NOTE — ED NOTES
TRANSFER - OUT REPORT: 
 
Verbal report given to Faith Block RN on Aline Lucia  being transferred to [facility] for routine progression of care Report consisted of patients Situation, Background, Assessment and  
Recommendations(SBAR). Information from the following report(s) SBAR was reviewed with the receiving nurse. Lines:  
Peripheral IV 10/06/18 Right Antecubital (Active) Site Assessment Clean, dry, & intact 10/6/2018 10:11 AM  
Phlebitis Assessment 0 10/6/2018 10:11 AM  
Infiltration Assessment 0 10/6/2018 10:11 AM  
Dressing Status Clean, dry, & intact 10/6/2018 10:11 AM  
Dressing Type Transparent 10/6/2018 10:11 AM  
Hub Color/Line Status Patent 10/6/2018 10:11 AM  
  
 
Opportunity for questions and clarification was provided. Patient transported with: 
 O2 @ 6 liters

## 2018-10-06 NOTE — H&P
Hospitalist Admission NoteNAME: Cecy Diaz :  1950 MRN:  [id number] Date/Time:  10/6/2018 1:30 PM 
 
Patient PCP: Milly Eddy MD 
________________________________________________________________________ My assessment of this patient's clinical condition and my plan of care is as follows. Assessment / Plan: 
Acute hypoxic respiratory failure due to Acute systolic congestive heart failure exacerbation Atrial fibrillation with rapid ventricular rate BNP is elevated and chest x-ray confirms congestive heart failure Start Bumex 1 mg IV twice daily continue metoprolol 20 mg daily, lisinopril Discussed with Dr. Carlos Eduardo Grace from cardiology For atrial fibrillation with RVR, Dr. Beth Thornton recommended to continue Toprol and no need for Cardizem for now Continue dabigatran Check troponins x2 Strict I's and O's, daily weights ECHO per cardiology Hypokalemia Replace potassium and recheck in a.m. Resume home potassium supplements Hypertension Resume Norvasc and metoprolol Dyslipidemia Resume atorvastatin Obesity  
   
  
     
   
 
 
 
 
 
Code Status: Full Surrogate Decision Maker:  Marjan Gallego DVT Prophylaxis: Pradaxa GI Prophylaxis: not indicated Baseline: From home independent Subjective: CHIEF COMPLAINT: Sob HISTORY OF PRESENT ILLNESS:    
This is a 43-year-old female with past medical history of systolic congestive heart failure, atrial fibrillation is coming to the hospital with chief complaints of shortness of breath since last 2 days.  Patient reports being in her usual state of health until about 2 days ago when she noticed shortness of breath which is worse with exertion, with orthopnea but no PND. Manuel Quintanilla reports mild cough but no phlegm.  Denies palpitations. Manuel Quintanilla does report associated chest pressure along with shortness of breath.  Denies abdominal pain, diarrhea or constipation.  Denies fever or chills.  When EMS arrived she was saturating about 88% on arrival and her saturations did not improve on nonrebreather.  She reports she has not been taking her usual medication since this morning. On arrival to the hospital she was tachycardic and blood pressure was 139/107.  She was saturating at about 88% on room air.  Lab work revealed hemoglobin of 12.9 WBC of 9.5.  BMP shows a potassium of 2.8, creatinine of 1.35. BNP was 6600, troponin 0 0.05.  She had a chest x-ray which revealed no development of moderate congestive heart failure. We were asked to admit for work up and evaluation of the above problems. Past Medical History:  
Diagnosis Date  Anxiety 1/22/2018  Arthritis OA  Asthma  Diabetes ([facility] [address]  Essential hypertension  GERD (gastroesophageal reflux disease)  Hypercholesterolemia 1/22/2018  Hypertension  Long-term use of high-risk medication 1/22/2018  Neuropathy  Other ill-defined conditions(799.89) IBS, spinal stenosis  Plantar fasciitis  Psychiatric disorder   
 depression, anxiety  Sleep apnea   
 uses CPAP  Type 2 diabetes mellitus with diabetic neuropathy, without long-term current use of insulin ([facility] [address] 6/5/2016 Past Surgical History:  
Procedure Laterality Date  CARDIAC SURG PROCEDURE UNLIST    
 stent  COLONOSCOPY N/A 3/14/2018 COLONOSCOPY performed by Freda Elizondo MD at [facility]  HX HYSTERECTOMY  HX PACEMAKER Social History Substance Use Topics  Smoking status: Never Smoker  Smokeless tobacco: Never Used  Alcohol use No  
  
 
Family History Problem Relation Age of Onset 26 [facility] Tayo Arthritis-osteo Mother  Hypertension Mother  High Cholesterol Mother  Crohn's Disease Mother  Heart Disease Mother  Alcohol abuse Father  High Cholesterol Sister  Hypertension Sister  Thyroid Disease Sister  COPD Sister  High Cholesterol Brother  Hypertension Brother  COPD Brother  COPD Child  Inflammatory Bowel Dz Child Allergies Allergen Reactions  Sulfa (Sulfonamide Antibiotics) Swelling  Amoxicillin Swelling Prior to Admission medications Medication Sig Start Date End Date Taking? Authorizing Provider  
dabigatran etexilate (PRADAXA) 150 mg capsule Take 300 mg by mouth daily. Yes Ernie Negrete MD  
metoprolol succinate (TOPROL-XL) 100 mg tablet Take 100 mg by mouth daily. Yes Ernie Negrete MD  
pregabalin (LYRICA) 200 mg capsule Take 400 mg by mouth two (2) times a day. Yes Ernie Negrete MD  
venlafaxine (EFFEXOR) 75 mg tablet Take 300 mg by mouth daily. Yes Ernie Negrete MD  
cholecalciferol, vitamin d3, (VITAMIN D3) 400 unit cap Take 400 Units by mouth daily. Yes Ernie Negrete MD  
calcium carbonate (CALCIUM 300 PO) Take 600 mg by mouth daily. Yes Ernie Negrete MD  
estrogens, conjugated (PREMARIN VA) Insert 10 mcg into vagina daily. Insert vaginally twice per week. Yes Ernie Negrete MD  
bumetanide (BUMEX) 1 mg tablet TAKE 1 TABLET IN THE MORNING ON TUESDAY & THURSDAY AND 2 TABLETS IN THE MORNING ON MONDAY, [facility] AND FRIDAY 10/3/18  Yes Eliane Otero MD  
clonazePAM (KLONOPIN) 0.5 mg tablet TAKE 1 TABLET EVERY NIGHT. MAX DAILY DOSE OF 0.5MG. 6/25/18  Yes Eliane Otero MD  
amLODIPine (NORVASC) 5 mg tablet TAKE 1 TABLET EVERY DAY 4/27/18  Yes Eliane Otero MD  
potassium gluconate 550 mg (90 mg) tab Take  by mouth. Yes Historical Provider  
magnesium 250 mg tab Take 250 mg by mouth daily. Yes Historical Provider  
aspirin 81 mg chewable tablet Take 81 mg by mouth daily. Yes Historical Provider  
lisinopril (PRINIVIL, ZESTRIL) 20 mg tablet Take 1 Tab by mouth daily. 4/9/14  Yes Macrina Garrett MD  
atorvastatin (LIPITOR) 40 mg tablet Take 1 Tab by mouth nightly. 4/9/14  Yes Macrina Garrett MD  
 
 
REVIEW OF SYSTEMS:    
I am not able to complete the review of systems because: The patient is intubated and sedated The patient has altered mental status due to his acute medical problems The patient has baseline aphasia from prior stroke(s) The patient has baseline dementia and is not reliable historian The patient is in acute medical distress and unable to provide information Total of 12 systems reviewed as follows:   
   POSITIVE= underlined text  Negative = text not underlined General:  fever, chills, sweats, generalized weakness, weight loss/gain,  
   loss of appetite Eyes:    blurred vision, eye pain, loss of vision, double vision ENT:    rhinorrhea, pharyngitis Respiratory:   cough, sputum production, SOB, STEEN, wheezing, pleuritic pain  
Cardiology:   chest pain, palpitations, orthopnea, PND, edema, syncope Gastrointestinal:  abdominal pain , N/V, diarrhea, dysphagia, constipation, bleeding Genitourinary:  frequency, urgency, dysuria, hematuria, incontinence Muskuloskeletal :  arthralgia, myalgia, back pain Hematology:  easy bruising, nose or gum bleeding, lymphadenopathy Dermatological: rash, ulceration, pruritis, color change / jaundice Endocrine:   hot flashes or polydipsia Neurological:  headache, dizziness, confusion, focal weakness, paresthesia, Speech difficulties, memory loss, gait difficulty Psychological: Feelings of anxiety, depression, agitation Objective: VITALS:   
Visit Vitals  BP (!) 128/97  Pulse (!) 123  Temp 98.3 °F (36.8 °C)  Resp 19  
 Ht 5' 9\" (1.753 m)  Wt 110 kg (242 lb 8.1 oz)  SpO2 96%  BMI 35.81 kg/m2 PHYSICAL EXAM: 
 
General:    no distress, appears stated age. HEENT: Atraumatic, anicteric sclerae, pink conjunctivae No oral ulcers, mucosa moist 
Neck:  Supple, symmetrical,  thyroid: non tender Lungs:   Air entry diminished, crackles +, no wheeze Chest wall:  No tenderness  No Accessory muscle use. Heart:   Regular  rhythm,  No  murmur   No edema Abdomen:   Soft, non-tender. Not distended. Bowel sounds normal 
Extremities: No cyanosis. No clubbing,   
  Skin turgor normal, Capillary refill normal, Radial dial pulse 2+ Skin:     Not Jaundiced  No rashes Psych:  Not anxious or agitated. Neurologic: EOMs intact. No facial asymmetry. No aphasia or slurred speech. Symmetrical strength, Sensation grossly intact. Alert and oriented X 4.  
 
_______________________________________________________________________ Care Plan discussed with: 
  Comments Patient y Family RN y   
Care Manager Consultant:     
_______________________________________________________________________ Expected  Disposition:  
Home with Family y HH/PT/OT/RN   
SNF/LTC   
LISA   
________________________________________________________________________ TOTAL TIME:  60  Minutes Critical Care Provided     Minutes non procedure based Comments  
 y Reviewed previous records  
>50% of visit spent in counseling and coordination of care y Discussion with patient and/or family and questions answered 
  
 
________________________________________________________________________ Signed: Nayeli Sanchez MD 
 
Procedures: see electronic medical records for all procedures/Xrays and details which were not copied into this note but were reviewed prior to creation of Plan. LAB DATA REVIEWED:   
Recent Results (from the past 24 hour(s)) EKG, 12 LEAD, INITIAL Collection Time: 10/06/18 10:14 AM  
Result Value Ref Range Ventricular Rate 134 BPM  
 Atrial Rate 127 BPM  
 QRS Duration 84 ms Q-T Interval 350 ms QTC Calculation (Bezet) 522 ms Calculated R Axis 14 degrees Calculated T Axis -69 degrees Diagnosis Atrial fibrillation with rapid ventricular response Septal infarct , age undetermined ST & T wave abnormality, consider inferior ischemia When compared with ECG of 10-SEP-2018 15:43, 
Septal infarct is now present Inverted T waves have replaced nonspecific T wave abnormality in Inferior  
leads CBC WITH AUTOMATED DIFF Collection Time: 10/06/18 10:26 AM  
Result Value Ref Range WBC 9.5 3.6 - 11.0 K/uL  
 RBC 4.44 3.80 - 5.20 M/uL  
 HGB 12.9 11.5 - 16.0 g/dL HCT 39.7 35.0 - 47.0 % MCV 89.4 80.0 - 99.0 FL  
 MCH 29.1 26.0 - 34.0 PG  
 MCHC 32.5 30.0 - 36.5 g/dL  
 RDW 18.6 (H) 11.5 - 14.5 % PLATELET 297 051 - 635 K/uL MPV 12.2 8.9 - 12.9 FL  
 NRBC 0.0 0  WBC ABSOLUTE NRBC 0.00 0.00 - 0.01 K/uL NEUTROPHILS 72 32 - 75 % LYMPHOCYTES 19 12 - 49 % MONOCYTES 6 5 - 13 % EOSINOPHILS 2 0 - 7 % BASOPHILS 0 0 - 1 % IMMATURE GRANULOCYTES 1 (H) 0.0 - 0.5 % ABS. NEUTROPHILS 6.8 1.8 - 8.0 K/UL  
 ABS. LYMPHOCYTES 1.8 0.8 - 3.5 K/UL  
 ABS. MONOCYTES 0.6 0.0 - 1.0 K/UL  
 ABS. EOSINOPHILS 0.2 0.0 - 0.4 K/UL  
 ABS. BASOPHILS 0.0 0.0 - 0.1 K/UL  
 ABS. IMM. GRANS. 0.1 (H) 0.00 - 0.04 K/UL  
 DF AUTOMATED METABOLIC PANEL, COMPREHENSIVE Collection Time: 10/06/18 10:26 AM  
Result Value Ref Range Sodium 145 136 - 145 mmol/L Potassium 2.8 (L) 3.5 - 5.1 mmol/L Chloride 108 97 - 108 mmol/L  
 CO2 28 21 - 32 mmol/L Anion gap 9 5 - 15 mmol/L Glucose 140 (H) 65 - 100 mg/dL BUN 22 (H) 6 - 20 MG/DL Creatinine 1.35 (H) 0.55 - 1.02 MG/DL  
 BUN/Creatinine ratio 16 12 - 20 GFR est AA 48 (L) >60 ml/min/1.73m2 GFR est non-AA 39 (L) >60 ml/min/1.73m2 Calcium 8.4 (L) 8.5 - 10.1 MG/DL Bilirubin, total 1.0 0.2 - 1.0 MG/DL  
 ALT (SGPT) 20 12 - 78 U/L  
 AST (SGOT) 17 15 - 37 U/L Alk. phosphatase 127 (H) 45 - 117 U/L Protein, total 6.9 6.4 - 8.2 g/dL Albumin 3.3 (L) 3.5 - 5.0 g/dL Globulin 3.6 2.0 - 4.0 g/dL A-G Ratio 0.9 (L) 1.1 - 2.2 CK W/ REFLX CKMB Collection Time: 10/06/18 10:26 AM  
Result Value Ref Range CK 59 26 - 192 U/L  
TROPONIN I Collection Time: 10/06/18 10:26 AM  
Result Value Ref Range Troponin-I, Qt. <0.05 <0.05 ng/mL SAMPLES BEING HELD Collection Time: 10/06/18 10:26 AM  
Result Value Ref Range SAMPLES BEING HELD BLUE TOP   
 COMMENT Add-on orders for these samples will be processed based on acceptable specimen integrity and analyte stability, which may vary by analyte. NT-PRO BNP Collection Time: 10/06/18 10:26 AM  
Result Value Ref Range NT pro-BNP 6678 (H) 0 - 125 PG/ML  
MAGNESIUM Collection Time: 10/06/18 10:26 AM  
Result Value Ref Range Magnesium 1.9 1.6 - 2.4 mg/dL

## 2018-10-06 NOTE — CONSULTS
Consult    NAME: Francy Vernon   :  1950   MRN:  [id number]     Date/Time:  10/6/2018 12:21 PM    Patient PCP: Ruba Moreno MD  ________________________________________________________________________     Assessment:     1. Acute on chronic combined systolic/diastolic heart failure  2. Acute hypoxic respiratory failure   3. Hypokalemia  4. Coronary artery disease s/p LAD ISELA  for NSTEMI  5. Cardiac PET  w/ no ischemia but EF 16% (likely 2/2 AF)  6. Mixed ischemic/tachycardic CM ; EF 20% in  up to 45%   7. Hypertension  8. Atrial fibrillation, paroxysmal s/p RFA  and  w/ recurrence and now persistent AF as of   9. Sick sinus syndrome s/p SJM PPM   10. Diabetes  11. Hyperlipidemia  12. SHAYLA on CPAP  13. Chronic kidney disease; Stg 3  14. Usual Cardiologist:  Dr. Julia Ferrara; just seen 10/2 in office        Plan:   Echo ordered by Dr. Julia Ferrara on 10/2 to reassess LVEF from PET  CXR:  \"moderate CHF\"  EKG:  AFib w/ RVR, NSSTTWc  K 2.8  TnI neg  BNP 6700  sCr 1.4    Didn't take home meds this am.    1. Aggressive K repletion  2. Diuresis to euvolemia  3. Will arrange for repeat echo here  4. Continue recently increased Toprol XL 100mg  5. Continue pradaxa 150mg BID  6. Continue ASA 81mg daily  7. Continue lisinopril 20mg daily  8. Continue norvasc 5mg daily  9. Alem Purple has been too expensive in the past  10. AFib has been managed by Dr. Brandee Mccallum; will see how she does tomorrow and what echo reveals. Reattempt at [facility] vs further therapies w/ Dr. Brandee Mccallum. Didn't take pradaxa Sat AM.    Thank you for this consult and allowing me to take part in this patients care. Please call with questions. [x]        High complexity decision making was performed        Subjective:   CHIEF COMPLAINT: SOB    HISTORY OF PRESENT ILLNESS:     Steffanie Saenz is a 79 y.o.  female who \"presents herself via EMS to the ED with cc of worsening SOB x last night (10/5/18).  Pt reports associated pressuring non-radiating left sided CP, palpitation, and nonproductive cough since onset of pain. She notes of being short of breath while on her bed wearing at home bipap. Per EMS, pt's SpO2 was 88% on arrival and en route, her SpO2 did not improve while on non-rebreather. Pt notes that her CP resolved PTA and has not taken her BP medicine this morning (9/6/18). Pt specifically denies fever, chills, increased swelling of legs, pleuritic pain, and jaw pain. \"      We were asked to consult for work up and evaluation of the above problems.      Past Medical History:   Diagnosis Date    Anxiety 1/22/2018    Arthritis     OA    Asthma     Diabetes ([facility] [address]     Essential hypertension     GERD (gastroesophageal reflux disease)     Hypercholesterolemia 1/22/2018    Hypertension     Long-term use of high-risk medication 1/22/2018    Neuropathy     Other ill-defined conditions(799.89)     IBS, spinal stenosis    Plantar fasciitis     Psychiatric disorder     depression, anxiety    Sleep apnea     uses CPAP    Type 2 diabetes mellitus with diabetic neuropathy, without long-term current use of insulin ([facility] [address] 6/5/2016      Past Surgical History:   Procedure Laterality Date    CARDIAC SURG PROCEDURE UNLIST      stent    COLONOSCOPY N/A 3/14/2018    [facility] ENDOSCOPY    HX APPENDECTOMY      HX HYSTERECTOMY      HX PACEMAKER       Allergies   Allergen Reactions    Sulfa (Sulfonamide Antibiotics) Swelling    Amoxicillin Swelling      Meds:  See below  Social History   Substance Use Topics    Smoking status: Never Smoker    Smokeless tobacco: Never Used    Alcohol use No      Family History   Problem Relation Age of Onset    Arthritis-osteo Mother     Hypertension Mother     High Cholesterol Mother     Crohn's Disease Mother     Heart Disease Mother     Alcohol abuse Father     High Cholesterol Sister     Hypertension Sister     Thyroid Disease Sister     COPD Sister     High Cholesterol Brother     Hypertension Brother     COPD Brother     COPD Child     Inflammatory Bowel Dz Child        REVIEW OF SYSTEMS:     []         Unable to obtain  ROS due to ---   [x]         Total of 12 systems reviewed as follows:    Constitutional: negative fever, negative chills, negative weight loss  Eyes:   negative visual changes  ENT:   negative sore throat, tongue or lip swelling  Respiratory:  negative cough, negative dyspnea  Cards:  negative for chest pain, palpitations, lower extremity edema  GI:   negative for nausea, vomiting, diarrhea, and abdominal pain  Genitourinary: negative for frequency, dysuria  Integument:  negative for rash   Hematologic:  negative for easy bruising and gum/nose bleeding  Musculoskel: negative for myalgias,  back pain  Neurological:  negative for headaches, dizziness, vertigo, weakness  Behavl/Psych: negative for feelings of anxiety, depression     Pertinent Positives include :    Objective:      Physical Exam:    Last 24hrs VS reviewed since prior progress note. Most recent are:    Visit Vitals    /80 (BP 1 Location: Left arm, BP Patient Position: At rest)    Pulse (!) 103    Temp 98.3 °F (36.8 °C)    Resp 18    Ht 5' 9\" (1.753 m)    Wt 110 kg (242 lb 8.1 oz)    SpO2 94%    BMI 35.81 kg/m2     No intake or output data in the 24 hours ending 10/06/18 1221     General Appearance: Well developed, well nourished, alert & oriented x 3,    no acute distress. Obese. Dyspneic conversationally. Ears/Nose/Mouth/Throat: Pupils equal and round, Hearing grossly normal.  Neck: Supple. JVP within normal limits. Carotids good upstrokes, with no bruit. Chest: Lungs w/ dec BS in the bases and rhonchi  Cardiovascular: Regular rate and rhythm, S1S2 normal, no murmur, rubs, gallops. Abdomen: Soft, non-tender, bowel sounds are active. No organomegaly. Extremities: 1-2+ edema bilaterally. Femoral pulses +2, Distal Pulses +1.   Skin: Warm and dry. Neuro: CN II-XII grossly intact, Strength and sensation grossly intact. []         Post-cath site without hematoma, bruit, tenderness, or thrill. Distal pulses intact. Data:      Telemetry:  AF    EKG:  AFib w/ RVR, NSSTTWc  []  No new EKG for review. Prior to Admission medications    Medication Sig Start Date End Date Taking? Authorizing Provider   bumetanide (BUMEX) 1 mg tablet TAKE 1 TABLET IN THE MORNING ON TUESDAY & THURSDAY AND 2 TABLETS IN THE MORNING ON MONDAY, [facility] AND FRIDAY 10/3/18   Stephen Perez MD   lidocaine (XYLOCAINE) 2 % solution Take 10 mL by mouth as needed for Pain. 9/10/18   Emilie Schuler DO   omeprazole (PRILOSEC) 40 mg capsule Take 1 Cap by mouth daily. 9/10/18   Emilie Schuler DO   predniSONE (DELTASONE) 5 mg tablet 5 tablets day for days 1 through 4, then 4 tablets on day 5, then 3 tablets on day 6, then 2 tablets on day 7, then 1 tablet on day 8. 8/21/18   Stephen Perez MD   azithromycin (ZITHROMAX) 250 mg tablet Take 1 Tab by mouth See Admin Instructions. 8/7/18   Stephen Perez MD   clonazePAM (KLONOPIN) 0.5 mg tablet TAKE 1 TABLET EVERY NIGHT. MAX DAILY DOSE OF 0.5MG. 6/25/18   Stephen Perez MD   amLODIPine (NORVASC) 5 mg tablet TAKE 1 TABLET EVERY DAY 4/27/18   Stephen Perez MD   potassium gluconate 550 mg (90 mg) tab Take  by mouth. Historical Provider   magnesium 250 mg tab Take  by mouth. Historical Provider   conjugated estrogens (PREMARIN) 0.625 mg/gram vaginal cream Insert 0.5 g into vagina daily. Historical Provider   calcium-cholecalciferol, d3, (CALCIUM 600 + D) 600-125 mg-unit tab Take  by mouth. Historical Provider   venlafaxine (EFFEXOR) 75 mg tablet TAKE 2 TABLETS TWICE A DAY 4/22/16   Mark Bermudez MD   apixaban (ELIQUIS) 5 mg tablet Take 1 Tab by mouth two (2) times a day. 4/20/16   Mady Bhatia MD   aspirin 81 mg chewable tablet Take 81 mg by mouth daily.     Historical Provider lisinopril (PRINIVIL, ZESTRIL) 20 mg tablet Take 1 Tab by mouth daily. 4/9/14   Maurisio Sethi MD   atorvastatin (LIPITOR) 40 mg tablet Take 1 Tab by mouth nightly. 4/9/14   Maurisio Sethi MD   Cholecalciferol, Vitamin D3, (VITAMIN D3) 1,000 unit cap Take  by mouth. Historical Provider       Recent Results (from the past 24 hour(s))   EKG, 12 LEAD, INITIAL    Collection Time: 10/06/18 10:14 AM   Result Value Ref Range    Ventricular Rate 134 BPM    Atrial Rate 127 BPM    QRS Duration 84 ms    Q-T Interval 350 ms    QTC Calculation (Bezet) 522 ms    Calculated R Axis 14 degrees    Calculated T Axis -69 degrees    Diagnosis       Atrial fibrillation with rapid ventricular response  Septal infarct , age undetermined  ST & T wave abnormality, consider inferior ischemia  When compared with ECG of 10-SEP-2018 15:43,  Septal infarct is now present  Inverted T waves have replaced nonspecific T wave abnormality in Inferior   leads     CBC WITH AUTOMATED DIFF    Collection Time: 10/06/18 10:26 AM   Result Value Ref Range    WBC 9.5 3.6 - 11.0 K/uL    RBC 4.44 3.80 - 5.20 M/uL    HGB 12.9 11.5 - 16.0 g/dL    HCT 39.7 35.0 - 47.0 %    MCV 89.4 80.0 - 99.0 FL    MCH 29.1 26.0 - 34.0 PG    MCHC 32.5 30.0 - 36.5 g/dL    RDW 18.6 (H) 11.5 - 14.5 %    PLATELET 030 736 - 685 K/uL    MPV 12.2 8.9 - 12.9 FL    NRBC 0.0 0  WBC    ABSOLUTE NRBC 0.00 0.00 - 0.01 K/uL    NEUTROPHILS 72 32 - 75 %    LYMPHOCYTES 19 12 - 49 %    MONOCYTES 6 5 - 13 %    EOSINOPHILS 2 0 - 7 %    BASOPHILS 0 0 - 1 %    IMMATURE GRANULOCYTES 1 (H) 0.0 - 0.5 %    ABS. NEUTROPHILS 6.8 1.8 - 8.0 K/UL    ABS. LYMPHOCYTES 1.8 0.8 - 3.5 K/UL    ABS. MONOCYTES 0.6 0.0 - 1.0 K/UL    ABS. EOSINOPHILS 0.2 0.0 - 0.4 K/UL    ABS. BASOPHILS 0.0 0.0 - 0.1 K/UL    ABS. IMM.  GRANS. 0.1 (H) 0.00 - 0.04 K/UL    DF AUTOMATED     METABOLIC PANEL, COMPREHENSIVE    Collection Time: 10/06/18 10:26 AM   Result Value Ref Range    Sodium 145 136 - 145 mmol/L    Potassium 2.8 (L) 3.5 - 5.1 mmol/L    Chloride 108 97 - 108 mmol/L    CO2 28 21 - 32 mmol/L    Anion gap 9 5 - 15 mmol/L    Glucose 140 (H) 65 - 100 mg/dL    BUN 22 (H) 6 - 20 MG/DL    Creatinine 1.35 (H) 0.55 - 1.02 MG/DL    BUN/Creatinine ratio 16 12 - 20      GFR est AA 48 (L) >60 ml/min/1.73m2    GFR est non-AA 39 (L) >60 ml/min/1.73m2    Calcium 8.4 (L) 8.5 - 10.1 MG/DL    Bilirubin, total 1.0 0.2 - 1.0 MG/DL    ALT (SGPT) 20 12 - 78 U/L    AST (SGOT) 17 15 - 37 U/L    Alk. phosphatase 127 (H) 45 - 117 U/L    Protein, total 6.9 6.4 - 8.2 g/dL    Albumin 3.3 (L) 3.5 - 5.0 g/dL    Globulin 3.6 2.0 - 4.0 g/dL    A-G Ratio 0.9 (L) 1.1 - 2.2     CK W/ REFLX CKMB    Collection Time: 10/06/18 10:26 AM   Result Value Ref Range    CK 59 26 - 192 U/L   TROPONIN I    Collection Time: 10/06/18 10:26 AM   Result Value Ref Range    Troponin-I, Qt. <0.05 <0.05 ng/mL   SAMPLES BEING HELD    Collection Time: 10/06/18 10:26 AM   Result Value Ref Range    SAMPLES BEING HELD BLUE TOP     COMMENT        Add-on orders for these samples will be processed based on acceptable specimen integrity and analyte stability, which may vary by analyte.    NT-PRO BNP    Collection Time: 10/06/18 10:26 AM   Result Value Ref Range    NT pro-BNP 6678 (H) 0 - 125 PG/ML        Ting Valencia III, DO

## 2018-10-06 NOTE — ED NOTES
1600 MD Au consulted at this time in regards to patient's condition per MD Aguirre Tori. 1608 Preparing to intubate. MD Laura Chaney at bedside. 1609 20mg of etomidate  100mg of [place] administered at this time by Alex Nuñez RN

## 2018-10-06 NOTE — PROGRESS NOTES
2:47 PM TRANSFER - IN REPORT: 
 
Verbal report received from Sandra(name) on Jo Valero  being received from ED(unit) for routine progression of care Report consisted of patients Situation, Background, Assessment and  
Recommendations(SBAR). Information from the following report(s) SBAR, Kardex, [facility] and Recent Results was reviewed with the receiving nurse. Opportunity for questions and clarification was provided. 3:58 PM Informed by ED RN patient is being transferred to PCU. [facility] charge RN updated re: plan of care.

## 2018-10-06 NOTE — ED NOTES
ED RN, Cisco Solorzano, at bedside to perform hourly rounds and check on the pt. No acute changes reported. Family at bedside. Will continue to monitor.

## 2018-10-06 NOTE — ED NOTES
Spoke with MD Kaden Kothari at this time who is aware of decompensation. States he is in route to ED to evaluate patient.

## 2018-10-06 NOTE — PROGRESS NOTES
Pharmacy  Enoxaparin (Lovenox®) Monitoring Indication: a fib Current Dose: Enoxaparin 120 subcutaneously every 12 hours Creatinine Clearance (mL/min): 42.8 ml/min Current Weight: 110 kg Labs: 
Recent Labs 10/06/18 
 1026 CREA  1.35* HGB  12.9 PLT  204 Wt Readings from Last 1 Encounters:  
10/06/18 110 kg (242 lb 8.1 oz) Ht Readings from Last 1 Encounters:  
10/06/18 175.3 cm (69\") Impression/Plan:  
Change to enoxaparin 105 mg subcutaneously every 12hr per protocol BMP ordered for tomorrow am  
 
Thanks, Hayley Wyatt, PHARMD

## 2018-10-06 NOTE — ACP (ADVANCE CARE PLANNING)
Advance Care Planning Note    Name: Jo Valero  YOB: 1950  MRN: [id number]  Admission Date: 10/6/2018 10:07 AM    Date of discussion: 10/6/2018    Active Diagnoses:    Hospital Problems  Date Reviewed: 8/7/2018          Codes Class Noted POA    Acute respiratory failure [facility]) ICD-10-CM: J96.00  ICD-9-CM: 518.81  10/6/2018 Unknown          Systolic CHF  Atrial fib  Obesity  HTN  DM  Dyslipidemia     These active diagnoses are of sufficient risk that focused discussion on advance care planning is indicated in order to allow the patient to thoughtfully consider personal goals of care, and if situations arise that prevent the ability to personally give input, to ensure appropriate representation of their personal desires for different levels and aggressiveness of care. Discussion:     Persons present and participating in discussion: Nisha Padilla MD,  Brenda Menon. Discussion: Code status discussed and wants to be a full code. Time Spent:     Total time spent face-to-face in education and discussion: 16  minutes.      Humaira Nelson MD  10/6/2018  1:33 PM

## 2018-10-07 ENCOUNTER — APPOINTMENT (OUTPATIENT)
Dept: GENERAL RADIOLOGY | Age: 68
DRG: 226 | End: 2018-10-07
Attending: INTERNAL MEDICINE
Payer: MEDICARE

## 2018-10-07 PROBLEM — I48.91 ATRIAL FIBRILLATION WITH RAPID VENTRICULAR RESPONSE (HCC): Status: ACTIVE | Noted: 2018-10-07

## 2018-10-07 PROBLEM — E87.6 HYPOKALEMIA: Status: ACTIVE | Noted: 2018-10-07

## 2018-10-07 PROBLEM — J96.01 ACUTE RESPIRATORY FAILURE WITH HYPOXEMIA (HCC): Status: ACTIVE | Noted: 2018-10-06

## 2018-10-07 LAB
ANION GAP SERPL CALC-SCNC: 8 MMOL/L (ref 5–15)
ANION GAP SERPL CALC-SCNC: 9 MMOL/L (ref 5–15)
ARTERIAL PATENCY WRIST A: YES
ATRIAL RATE: 127 BPM
ATRIAL RATE: 150 BPM
BASE EXCESS BLDA CALC-SCNC: 4.4 MMOL/L
BASOPHILS # BLD: 0 K/UL (ref 0–0.1)
BASOPHILS NFR BLD: 0 % (ref 0–1)
BDY SITE: ABNORMAL
BUN SERPL-MCNC: 19 MG/DL (ref 6–20)
BUN SERPL-MCNC: 21 MG/DL (ref 6–20)
BUN/CREAT SERPL: 12 (ref 12–20)
BUN/CREAT SERPL: 15 (ref 12–20)
CALCIUM SERPL-MCNC: 7.8 MG/DL (ref 8.5–10.1)
CALCIUM SERPL-MCNC: 7.9 MG/DL (ref 8.5–10.1)
CALCULATED R AXIS, ECG10: 14 DEGREES
CALCULATED R AXIS, ECG10: 18 DEGREES
CALCULATED T AXIS, ECG11: -4 DEGREES
CALCULATED T AXIS, ECG11: -69 DEGREES
CHLORIDE SERPL-SCNC: 106 MMOL/L (ref 97–108)
CHLORIDE SERPL-SCNC: 108 MMOL/L (ref 97–108)
CO2 SERPL-SCNC: 28 MMOL/L (ref 21–32)
CO2 SERPL-SCNC: 29 MMOL/L (ref 21–32)
CREAT SERPL-MCNC: 1.38 MG/DL (ref 0.55–1.02)
CREAT SERPL-MCNC: 1.54 MG/DL (ref 0.55–1.02)
DIAGNOSIS, 93000: NORMAL
DIAGNOSIS, 93000: NORMAL
DIFFERENTIAL METHOD BLD: ABNORMAL
EOSINOPHIL # BLD: 0.1 K/UL (ref 0–0.4)
EOSINOPHIL NFR BLD: 1 % (ref 0–7)
EPAP/CPAP/PEEP, PAPEEP: 6
ERYTHROCYTE [DISTWIDTH] IN BLOOD BY AUTOMATED COUNT: 18.6 % (ref 11.5–14.5)
FIO2 ON VENT: 50 %
GAS FLOW.O2 SETTING OXYMISER: 16 L/MIN
GLUCOSE SERPL-MCNC: 131 MG/DL (ref 65–100)
GLUCOSE SERPL-MCNC: 155 MG/DL (ref 65–100)
HCO3 BLDA-SCNC: 26 MMOL/L (ref 22–26)
HCT VFR BLD AUTO: 36.9 % (ref 35–47)
HGB BLD-MCNC: 11.9 G/DL (ref 11.5–16)
IMM GRANULOCYTES # BLD: 0 K/UL (ref 0–0.04)
IMM GRANULOCYTES NFR BLD AUTO: 0 % (ref 0–0.5)
LYMPHOCYTES # BLD: 1.5 K/UL (ref 0.8–3.5)
LYMPHOCYTES NFR BLD: 15 % (ref 12–49)
MCH RBC QN AUTO: 28.9 PG (ref 26–34)
MCHC RBC AUTO-ENTMCNC: 32.2 G/DL (ref 30–36.5)
MCV RBC AUTO: 89.6 FL (ref 80–99)
MONOCYTES # BLD: 0.6 K/UL (ref 0–1)
MONOCYTES NFR BLD: 6 % (ref 5–13)
NEUTS SEG # BLD: 8.1 K/UL (ref 1.8–8)
NEUTS SEG NFR BLD: 78 % (ref 32–75)
NRBC # BLD: 0 K/UL (ref 0–0.01)
NRBC BLD-RTO: 0 PER 100 WBC
PCO2 BLDA: 29 MMHG (ref 35–45)
PH BLDA: 7.56 [PH] (ref 7.35–7.45)
PLATELET # BLD AUTO: 187 K/UL (ref 150–400)
PMV BLD AUTO: 11.8 FL (ref 8.9–12.9)
PO2 BLDA: 74 MMHG (ref 80–100)
POTASSIUM SERPL-SCNC: 2.7 MMOL/L (ref 3.5–5.1)
POTASSIUM SERPL-SCNC: 2.9 MMOL/L (ref 3.5–5.1)
Q-T INTERVAL, ECG07: 350 MS
Q-T INTERVAL, ECG07: 360 MS
QRS DURATION, ECG06: 78 MS
QRS DURATION, ECG06: 84 MS
QTC CALCULATION (BEZET), ECG08: 522 MS
QTC CALCULATION (BEZET), ECG08: 527 MS
RBC # BLD AUTO: 4.12 M/UL (ref 3.8–5.2)
SAO2 % BLD: 97 % (ref 92–97)
SAO2% DEVICE SAO2% SENSOR NAME: ABNORMAL
SERVICE CMNT-IMP: ABNORMAL
SODIUM SERPL-SCNC: 142 MMOL/L (ref 136–145)
SODIUM SERPL-SCNC: 146 MMOL/L (ref 136–145)
SPECIMEN SITE: ABNORMAL
VENTILATION MODE VENT: ABNORMAL
VENTRICULAR RATE, ECG03: 129 BPM
VENTRICULAR RATE, ECG03: 134 BPM
VT SETTING VENT: 500 ML
WBC # BLD AUTO: 10.4 K/UL (ref 3.6–11)

## 2018-10-07 PROCEDURE — 94003 VENT MGMT INPAT SUBQ DAY: CPT

## 2018-10-07 PROCEDURE — 82803 BLOOD GASES ANY COMBINATION: CPT | Performed by: INTERNAL MEDICINE

## 2018-10-07 PROCEDURE — 65660000000 HC RM CCU STEPDOWN

## 2018-10-07 PROCEDURE — 74011000258 HC RX REV CODE- 258: Performed by: INTERNAL MEDICINE

## 2018-10-07 PROCEDURE — 74011250636 HC RX REV CODE- 250/636: Performed by: INTERNAL MEDICINE

## 2018-10-07 PROCEDURE — 74011000250 HC RX REV CODE- 250: Performed by: INTERNAL MEDICINE

## 2018-10-07 PROCEDURE — 71045 X-RAY EXAM CHEST 1 VIEW: CPT

## 2018-10-07 PROCEDURE — 74011250637 HC RX REV CODE- 250/637: Performed by: INTERNAL MEDICINE

## 2018-10-07 PROCEDURE — 80048 BASIC METABOLIC PNL TOTAL CA: CPT | Performed by: INTERNAL MEDICINE

## 2018-10-07 PROCEDURE — 36600 WITHDRAWAL OF ARTERIAL BLOOD: CPT | Performed by: INTERNAL MEDICINE

## 2018-10-07 PROCEDURE — 85025 COMPLETE CBC W/AUTO DIFF WBC: CPT | Performed by: INTERNAL MEDICINE

## 2018-10-07 PROCEDURE — 36415 COLL VENOUS BLD VENIPUNCTURE: CPT | Performed by: INTERNAL MEDICINE

## 2018-10-07 RX ORDER — FENTANYL CITRATE 50 UG/ML
25-200 INJECTION, SOLUTION INTRAMUSCULAR; INTRAVENOUS
Status: DISCONTINUED | OUTPATIENT
Start: 2018-10-07 | End: 2018-10-11

## 2018-10-07 RX ORDER — POTASSIUM CHLORIDE 29.8 MG/ML
20 INJECTION INTRAVENOUS
Status: COMPLETED | OUTPATIENT
Start: 2018-10-07 | End: 2018-10-08

## 2018-10-07 RX ORDER — MAGNESIUM SULFATE HEPTAHYDRATE 40 MG/ML
2 INJECTION, SOLUTION INTRAVENOUS ONCE
Status: COMPLETED | OUTPATIENT
Start: 2018-10-07 | End: 2018-10-07

## 2018-10-07 RX ORDER — PREGABALIN 100 MG/1
200 CAPSULE ORAL 2 TIMES DAILY
Status: DISCONTINUED | OUTPATIENT
Start: 2018-10-08 | End: 2018-10-22 | Stop reason: HOSPADM

## 2018-10-07 RX ORDER — PREGABALIN 100 MG/1
200 CAPSULE ORAL 2 TIMES DAILY
Status: DISCONTINUED | OUTPATIENT
Start: 2018-10-07 | End: 2018-10-07

## 2018-10-07 RX ORDER — POTASSIUM CHLORIDE 14.9 MG/ML
10 INJECTION INTRAVENOUS
Status: DISCONTINUED | OUTPATIENT
Start: 2018-10-07 | End: 2018-10-07

## 2018-10-07 RX ORDER — POTASSIUM CHLORIDE 29.8 MG/ML
20 INJECTION INTRAVENOUS
Status: COMPLETED | OUTPATIENT
Start: 2018-10-08 | End: 2018-10-08

## 2018-10-07 RX ORDER — VENLAFAXINE 37.5 MG/1
150 TABLET ORAL DAILY
Status: DISCONTINUED | OUTPATIENT
Start: 2018-10-08 | End: 2018-10-10

## 2018-10-07 RX ORDER — LANOLIN ALCOHOL/MO/W.PET/CERES
400 CREAM (GRAM) TOPICAL DAILY
Status: DISCONTINUED | OUTPATIENT
Start: 2018-10-07 | End: 2018-10-22 | Stop reason: HOSPADM

## 2018-10-07 RX ORDER — POTASSIUM CHLORIDE 14.9 MG/ML
10 INJECTION INTRAVENOUS
Status: DISCONTINUED | OUTPATIENT
Start: 2018-10-08 | End: 2018-10-07

## 2018-10-07 RX ORDER — POTASSIUM CHLORIDE 14.9 MG/ML
10 INJECTION INTRAVENOUS
Status: COMPLETED | OUTPATIENT
Start: 2018-10-07 | End: 2018-10-07

## 2018-10-07 RX ADMIN — FAMOTIDINE 20 MG: 10 INJECTION, SOLUTION INTRAVENOUS at 10:02

## 2018-10-07 RX ADMIN — METOPROLOL SUCCINATE 100 MG: 50 TABLET, EXTENDED RELEASE ORAL at 09:30

## 2018-10-07 RX ADMIN — Medication 10 ML: at 06:29

## 2018-10-07 RX ADMIN — ASPIRIN 81 MG CHEWABLE TABLET 81 MG: 81 TABLET CHEWABLE at 09:29

## 2018-10-07 RX ADMIN — AMIODARONE HYDROCHLORIDE 0.5 MG/MIN: 50 INJECTION, SOLUTION INTRAVENOUS at 00:29

## 2018-10-07 RX ADMIN — POTASSIUM CHLORIDE 10 MEQ: 200 INJECTION, SOLUTION INTRAVENOUS at 06:26

## 2018-10-07 RX ADMIN — ENOXAPARIN SODIUM 105 MG: 120 INJECTION SUBCUTANEOUS at 06:25

## 2018-10-07 RX ADMIN — Medication 10 ML: at 14:10

## 2018-10-07 RX ADMIN — AMIODARONE HYDROCHLORIDE 150 MG: 50 INJECTION, SOLUTION INTRAVENOUS at 14:09

## 2018-10-07 RX ADMIN — AMIODARONE HYDROCHLORIDE 1 MG/MIN: 50 INJECTION, SOLUTION INTRAVENOUS at 18:37

## 2018-10-07 RX ADMIN — MAGNESIUM SULFATE HEPTAHYDRATE 2 G: 40 INJECTION, SOLUTION INTRAVENOUS at 11:25

## 2018-10-07 RX ADMIN — CHLORHEXIDINE GLUCONATE 15 ML: 1.2 RINSE ORAL at 17:13

## 2018-10-07 RX ADMIN — POTASSIUM CHLORIDE 10 MEQ: 200 INJECTION, SOLUTION INTRAVENOUS at 10:06

## 2018-10-07 RX ADMIN — BUMETANIDE 2 MG: 0.25 INJECTION INTRAMUSCULAR; INTRAVENOUS at 11:45

## 2018-10-07 RX ADMIN — PROPOFOL 25 MCG/KG/MIN: 10 INJECTION, EMULSION INTRAVENOUS at 04:07

## 2018-10-07 RX ADMIN — AMIODARONE HYDROCHLORIDE 1 MG/MIN: 50 INJECTION, SOLUTION INTRAVENOUS at 11:45

## 2018-10-07 RX ADMIN — MUPIROCIN: 20 OINTMENT TOPICAL at 09:29

## 2018-10-07 RX ADMIN — PREGABALIN 400 MG: 100 CAPSULE ORAL at 09:30

## 2018-10-07 RX ADMIN — Medication 10 ML: at 20:00

## 2018-10-07 RX ADMIN — POTASSIUM CHLORIDE 10 MEQ: 200 INJECTION, SOLUTION INTRAVENOUS at 09:02

## 2018-10-07 RX ADMIN — CHLORHEXIDINE GLUCONATE 15 ML: 1.2 RINSE ORAL at 09:30

## 2018-10-07 RX ADMIN — POTASSIUM CHLORIDE 20 MEQ: 400 INJECTION, SOLUTION INTRAVENOUS at 21:39

## 2018-10-07 RX ADMIN — BUMETANIDE 2 MG: 0.25 INJECTION INTRAMUSCULAR; INTRAVENOUS at 00:17

## 2018-10-07 RX ADMIN — ENOXAPARIN SODIUM 105 MG: 120 INJECTION SUBCUTANEOUS at 17:11

## 2018-10-07 RX ADMIN — PROPOFOL 25 MCG/KG/MIN: 10 INJECTION, EMULSION INTRAVENOUS at 10:02

## 2018-10-07 RX ADMIN — Medication 400 MG: at 09:30

## 2018-10-07 RX ADMIN — POTASSIUM CHLORIDE 10 MEQ: 200 INJECTION, SOLUTION INTRAVENOUS at 07:33

## 2018-10-07 RX ADMIN — POTASSIUM CHLORIDE 20 MEQ: 400 INJECTION, SOLUTION INTRAVENOUS at 19:34

## 2018-10-07 RX ADMIN — PROPOFOL 25 MCG/KG/MIN: 10 INJECTION, EMULSION INTRAVENOUS at 06:30

## 2018-10-07 RX ADMIN — MUPIROCIN: 20 OINTMENT TOPICAL at 17:13

## 2018-10-07 RX ADMIN — ACETAMINOPHEN 650 MG: 325 TABLET ORAL at 12:45

## 2018-10-07 NOTE — PROGRESS NOTES
Progress Note 
 
 
10/7/2018 8:36 AM 
NAME: Bradley Oconnor MRN:  [id number] Admit Diagnosis: Acute respiratory failure ([facility] [address] Problem List: 1. Acute on chronic combined systolic/diastolic heart failure 2. Acute hypoxic respiratory failure requiring emergent intubation 3. Hypokalemia 4. Coronary artery disease s/p LAD ISELA '24 for NSTEMI 5. Cardiac PET 9/18 w/ no ischemia but EF 16% (likely 2/2 AF) 6. Mixed ischemic/tachycardic CM ; EF 20% in '14 up to 45% 11/17 
7. Hypertension 8. Atrial fibrillation, paroxysmal s/p RFA 4/16 and 1/17 w/ recurrence and now persistent AF as of '17 
9. Sick sinus syndrome s/p SJM PPM 7/14 
10. Diabetes 11. Hyperlipidemia 12. SHAYLA on CPAP 13. Chronic kidney disease; Stg 3 
14. Usual Cardiologist:  Dr. Ignacio Stokes; just seen 10/2 in office Assessment/Plan:  
Remains intubated HRs better controlled; could be better; on amio gtt @ 1 Briefly on levophed, now off Agitated w/ reduction in sedation Neg 2L 
K 2.7 Mg 1.9 
40% FiO2 CXR improved Probe on chest in ER w/ EF 15-20%; formal echo pending 1. Aggressive K repletion; recheck this afternoon 2. Mag supplementation 3. Bumex 2mg IV BID 4. Dont think she needs inotropes now 5. Continue lovenox (pradaxa cannot be crushed) 6. Continue remainder of meds; BP is controlled. Holding Toprol XL for now. Statin, ASA. Reinstate ACEi/ARB before discharge or if BP control is an issue 7. Discuss w/ Dr. Favian Reyna AFib management  
 
  
 [x]       High complexity decision making was performed in this patient at high risk for decompensation with multiple organ involvement. Subjective:  
 
Bradley Oconnor is intubated and sedated Discussed with RN events overnight. Review of Systems: 
 
Symptom Y/N Comments  Symptom Y/N Comments Fever/Chills N   Chest Pain N Poor Appetite N   Edema N   
Cough N   Abdominal Pain N Sputum N   Joint Pain N   
SOB/STEEN N   Pruritis/Rash N   
 Nausea/vomit N   Tolerating PT/OT Y Diarrhea N   Tolerating Diet Y Constipation N   Other Could NOT obtain due to:   
 
Objective:  
  
Physical Exam: 
 
Last 24hrs VS reviewed since prior progress note. Most recent are: 
 
Visit Vitals  BP (!) 127/98 (BP 1 Location: Right arm, BP Patient Position: At rest)  Pulse (!) 123  Temp 99.8 °F (37.7 °C)  Resp 20  
 Ht 5' 9\" (1.753 m)  Wt 108.4 kg (238 lb 15.7 oz)  SpO2 96%  Breastfeeding No  
 BMI 35.29 kg/m2 Intake/Output Summary (Last 24 hours) at 10/07/18 0333 Last data filed at 10/07/18 0730 Gross per 24 hour Intake           865.49 ml Output             2913 ml Net         -2047.51 ml General Appearance: Well developed, well nourished, intubated/sedated Ears/Nose/Mouth/Throat: Hearing grossly normal. 
Neck: Supple. Chest: Lungs w/ scattered rhonchi 
Cardiovascular: Irregular rate and rhythm, S1S2 normal, no murmur. Abdomen: Soft, non-tender, bowel sounds are active. Extremities: 1+ edema bilaterally. Skin: Warm and dry. []         Post-cath site without hematoma, bruit, tenderness, or thrill. Distal pulses intact. PMH/SH reviewed - no change compared to H&P Data Review Telemetry: AF 
 
EKG:  
[x]  No new EKG for review Lab Data Personally Reviewed: 
 
Recent Labs 10/07/18 
 0420  10/06/18 
 1026 WBC  10.4  9.5 HGB  11.9  12.9 HCT  36.9  39.7 PLT  187  204 No results for input(s): INR, PTP, APTT in the last 72 hours. No lab exists for component: INREXT Recent Labs 10/07/18 
 0420  10/06/18 
 1026 NA  146*  145  
K  2.7*  2.8*  
CL  108  108 CO2  29  28 BUN  21*  22* CREA  1.38*  1.35* GLU  131*  140* CA  7.9*  8.4* MG   --   1.9 Recent Labs 10/06/18 
 1731  10/06/18 
 1026 TROIQ  0.05*  <0.05 Lab Results Component Value Date/Time  Cholesterol, total 104 08/01/2018 10:17 AM  
 HDL Cholesterol 33 (L) 08/01/2018 10:17 AM  
 LDL, calculated 49 08/01/2018 10:17 AM  
 Triglyceride 110 08/01/2018 10:17 AM  
 CHOL/HDL Ratio 4.7 04/03/2014 03:30 PM  
 
 
Recent Labs 10/06/18 
 1026 SGOT  17  
AP  127* TP  6.9 ALB  3.3*  
GLOB  3.6 Recent Labs 10/07/18 
 0357  10/06/18 
 1700 PH  7.56*  7.35  
PCO2  29*  44  
PO2  74*  84  
 
 
Medications Personally Reviewed: 
 
Current Facility-Administered Medications Medication Dose Route Frequency  magnesium oxide (MAG-OX) tablet 400 mg  400 mg Oral DAILY  potassium chloride 10 mEq in 50 ml IVPB  10 mEq IntraVENous Q1H  
 aspirin chewable tablet 81 mg  81 mg Oral DAILY  atorvastatin (LIPITOR) tablet 40 mg  40 mg Oral QHS  clonazePAM (KlonoPIN) tablet 0.5 mg  0.5 mg Oral DAILY PRN  
 . PHARMACY TO SUBSTITUTE PER PROTOCOL (Reordered from: potassium gluconate 550 mg (90 mg) tab)    Per Protocol  pregabalin (LYRICA) capsule 400 mg  400 mg Oral BID  venlafaxine (EFFEXOR) tablet 300 mg  300 mg Oral DAILY  sodium chloride (NS) flush 5-10 mL  5-10 mL IntraVENous Q8H  
 sodium chloride (NS) flush 5-10 mL  5-10 mL IntraVENous PRN  
 acetaminophen (TYLENOL) tablet 650 mg  650 mg Oral Q6H PRN  
 docusate sodium (COLACE) capsule 100 mg  100 mg Oral DAILY PRN  
 metoprolol succinate (TOPROL-XL) XL tablet 100 mg  100 mg Oral DAILY  propofol (DIPRIVAN) infusion  0-50 mcg/kg/min IntraVENous TITRATE  amiodarone (CORDARONE) 375 mg/250 mL D5W infusion  0.5-1 mg/min IntraVENous TITRATE  enoxaparin (LOVENOX) partial dose injection 105 mg  105 mg SubCUTAneous Q12H  prochlorperazine (COMPAZINE) with saline injection 5 mg  5 mg IntraVENous Q6H PRN  
 bumetanide (BUMEX) injection 2 mg  2 mg IntraVENous Q12H  
 NOREPINephrine (LEVOPHED) 8 mg in 5% dextrose 250mL infusion  2-16 mcg/min IntraVENous TITRATE  mupirocin (BACTROBAN) 2 % ointment   Both Nostrils BID  chlorhexidine (PERIDEX) 0.12 % mouthwash 15 mL  15 mL Oral BID Antonette Wahl III, DO

## 2018-10-07 NOTE — PROGRESS NOTES
Problem: Pressure Injury - Risk of 
Goal: *Prevention of pressure injury Document Preet Scale and appropriate interventions in the flowsheet. Outcome: Progressing Towards Goal 
Pressure Injury Interventions: 
Sensory Interventions: Assess changes in LOC, Assess need for specialty bed, Avoid rigorous massage over bony prominences, Check visual cues for pain, Float heels, Keep linens dry and wrinkle-free, Maintain/enhance activity level, Minimize linen layers, Monitor skin under medical devices, Pad between skin to skin, Pressure redistribution bed/mattress (bed type), Turn and reposition approx. every two hours (pillows and wedges if needed) Activity Interventions: Assess need for specialty bed, Pressure redistribution bed/mattress(bed type), PT/OT evaluation Mobility Interventions: Assess need for specialty bed, Float heels, HOB 30 degrees or less, Pressure redistribution bed/mattress (bed type), PT/OT evaluation, Turn and reposition approx. every two hours(pillow and wedges) Nutrition Interventions: Discuss nutritional consult with provider, Document food/fluid/supplement intake Friction and Shear Interventions: Foam dressings/transparent film/skin sealants, HOB 30 degrees or less, Lift sheet, Lift team/patient mobility team, Minimize layers, Transferring/repositioning devices

## 2018-10-07 NOTE — PROGRESS NOTES
Pharmacy Automatic Renal Dosing Protocol Medication: pregabalin PTA Medication Dose:  
? 400 mg BID is listed on PTA med list, but reviewing  data, it looks like patient has been filling 200 mg bid Labs: 
Recent Labs 10/07/18 
 0420  10/06/18 
 1026 CREA  1.38*  1.35* BUN  21*  22* WBC  10.4  9.5 Temp (24hrs), Av.9 °F (37.2 °C), Min:97.3 °F (36.3 °C), Max:100 °F (37.8 °C) Creatinine Clearance (Creatinine Clearance (ml/min)): 41 mL/min Impression/Plan:  
? Patient looks to be around baseline Scr 
? Pharmacy adjusting pregabalin to PTA filled regimen of 200 mg bid Pharmacy will follow daily and adjust medications as appropriate for renal function and/or serum levels. Thank you, 
 
Muriel Davila, PharmD, [facility] Clinical [facility] Special[facility]

## 2018-10-07 NOTE — PROGRESS NOTES
1900 Bedside and Verbal shift change received from Gordo Malik, DIAN. Report included the following information SBAR, Kardex, ED Summary, Intake/Output, MAR, Recent Results and Cardiac Rhythm A Fib.  
 
1905 BP 68/50. Pulmonology paged. 1921 Dr. Vanessa Lester returned call. Ordered Levophed and restraints. 2000 Shift assessment completed. See doc flowsheets for details. 6703 Lab called for critical value Potassium 2.7. 
 
0450 Dr. Anuja Castle paged. 1 Dr. Anuja Castle called back. Made aware of Potassium 2.7. Received ordered for replacement. 0715 Bedside and Verbal shift change report given to Lou Lucia RN (oncoming nurse) by Nickie Moralez and Dianne Mark RN (offgoing nurse). Report included the following information SBAR, Kardex, ED Summary, Intake/Output, MAR, Recent Results and Cardiac Rhythm A Fib.

## 2018-10-07 NOTE — PROGRESS NOTES
[facility], Critical Care, and Sleep Medicine Name: Jose Dominguez MRN: [id number] : 1950 Hospital: [facility] Date: 10/7/2018 IMPRESSION:  
· Acute hypoxic respiratory failure · Acute pulmonary edema · Acute/chronic systolic/diastolic heart failure - LVEF 15% · Ischemic cardiomyopathy · SSS with pacemaker · Chronic afib, now in RVR · DM 
· CKD · SHAYLA PLAN:  
· Ventilator support - adjust for ABG · Diuretics · Amiodarone drip · Ideally would add back her home metoprolol, but difficult with her decompensation in heart failure · Insulin/glycemic monitoring · Monitor creatinine · Pain control/sedation as needed · DVT/GI prophylaxis · Critically ill Subjective/Interval History:  
I have reviewed the flowsheet and previous days notes. The patient is unable to give any meaningful history or review of systems because the patient is: Intubated/sedated - presented overnight with respiratory failure from CHF/pulmonary edema requiring intubation, failed SAT this morning The patient is critically ill on: Mechanical ventilation Review of Systems Unable to perform ROS: Intubated Objective: 
Vital Signs:   
Visit Vitals  /74  Pulse (!) 123  Temp 99.8 °F (37.7 °C)  Resp 20  
 Ht 5' 9\" (1.753 m)  Wt 108.4 kg (238 lb 15.7 oz)  SpO2 96%  Breastfeeding No  
 BMI 35.29 kg/m2 O2 Device: Endotracheal tube, Ventilator O2 Flow Rate (L/min): 15 l/min Temp (24hrs), Av.9 °F (37.2 °C), Min:97.3 °F (36.3 °C), Max:100 °F (37.8 °C) Intake/Output:  
Last shift:      10/07 0701 - 10/07 1900 In: 48 [I.V.:50] Out: 138 [Urine:38] Last 3 shifts: 10/05 1901 - 10/07 0700 In: 815.5 [I.V.:805.5] Out: 0379 [Urine:2575] Intake/Output Summary (Last 24 hours) at 10/07/18 5481 Last data filed at 10/07/18 0730 Gross per 24 hour Intake           865.49 ml Output             2913 ml  
 Net         -2047.51 ml Hemodynamics:  
PAP:   CO:    
Wedge:   CI:    
CVP:    SVR:    
  PVR:    
 
Ventilator Settings: 
Mode Rate Tidal Volume Pressure FiO2 PEEP Assist control   450 ml    40 % 6 cm H20 Peak airway pressure: 21 cm H2O Minute ventilation: 9.31 l/min Physical Exam  
Constitutional: She appears ill. She is sedated and intubated. HENT:  
Head: Normocephalic and atraumatic. Mouth/Throat: No oropharyngeal exudate. Eyes: No scleral icterus. Cardiovascular: An irregularly irregular rhythm present. Tachycardia present. No murmur heard. Pulmonary/Chest: She is intubated. She has no wheezes. She has rales. Abdominal: Soft. Bowel sounds are normal. She exhibits no distension. There is no tenderness. Musculoskeletal: She exhibits edema. Skin: Skin is warm and dry. No rash noted. Data:  
 
Current Facility-Administered Medications Medication Dose Route Frequency  magnesium oxide (MAG-OX) tablet 400 mg  400 mg Oral DAILY  potassium chloride 10 mEq in 50 ml IVPB  10 mEq IntraVENous Q1H  
 magnesium sulfate 2 g/50 ml IVPB (premix or compounded)  2 g IntraVENous ONCE  
 aspirin chewable tablet 81 mg  81 mg Oral DAILY  atorvastatin (LIPITOR) tablet 40 mg  40 mg Oral QHS  pregabalin (LYRICA) capsule 400 mg  400 mg Oral BID  venlafaxine (EFFEXOR) tablet 300 mg  300 mg Oral DAILY  sodium chloride (NS) flush 5-10 mL  5-10 mL IntraVENous Q8H  
 metoprolol succinate (TOPROL-XL) XL tablet 100 mg  100 mg Oral DAILY  propofol (DIPRIVAN) infusion  0-50 mcg/kg/min IntraVENous TITRATE  amiodarone (CORDARONE) 375 mg/250 mL D5W infusion  0.5-1 mg/min IntraVENous TITRATE  enoxaparin (LOVENOX) partial dose injection 105 mg  105 mg SubCUTAneous Q12H  
 bumetanide (BUMEX) injection 2 mg  2 mg IntraVENous Q12H  
 NOREPINephrine (LEVOPHED) 8 mg in 5% dextrose 250mL infusion  2-16 mcg/min IntraVENous TITRATE  mupirocin (BACTROBAN) 2 % ointment   Both Nostrils BID  chlorhexidine (PERIDEX) 0.12 % mouthwash 15 mL  15 mL Oral BID Labs: 
Recent Labs 10/07/18 
 0420  10/06/18 
 1026 WBC  10.4  9.5 HGB  11.9  12.9 HCT  36.9  39.7 PLT  187  204 Recent Labs 10/07/18 
 0420  10/06/18 
 1026 NA  146*  145  
K  2.7*  2.8*  
CL  108  108 CO2  29  28 GLU  131*  140* BUN  21*  22* CREA  1.38*  1.35* CA  7.9*  8.4* MG   --   1.9 ALB   --   3.3* TBILI   --   1.0 SGOT   --   17 ALT   --   20 Recent Labs 10/07/18 
 0357  10/06/18 
 1700  10/06/18 
 1555 PH  7.56*  7.35  7.41  
PCO2  29*  44  39 PO2  74*  84  64* HCO3  26  24  24 FIO2  50  100   --   
 
Imaging: 
I have personally reviewed the patients radiographs and have reviewed the reports: 
Pulmonary edema, decreased since intubation Total critical care time exclusive of procedures: 35 minutes Quita Rider MD

## 2018-10-07 NOTE — PROGRESS NOTES
0700: Received bedside and verbal shift report from DIAN Camarena. 
 
0730: Assessment complete, see flowsheets for details; patient sedated but arrousable to voice, follows commands, able to move all 4 extremities, BP stable, a-fib (rate 120's) per Dr. Tino Acharya, keep HR <110 and keep amiodarone drip at 1mg/min, do not titrate to 0.5mg/min, also verbal order received to give an additional 150 mg bolus of amiodarone if HR does not stay below 110; lungs course, temp 99.8 axillary, does not appear to be in pain/discomfort. 1200: Assessment complete, see flowsheets for details; HR remains controled under 110, temp 101 axillary, will give PRN acetaminophen, does not appear to be in pain/discomfort. 1340: HR has remained >120, will order 150mg amiodarone bolus per verbal order from Dr. Tino Acharya. 1449: Patient converted to paced rhythm, Dr. Tino Acharya updated via telephone; verbal order received to decreased amiodarone to 0.5mg/hr in 6 hours. 1600: Assessment complete, see flowsheets for details; afebrile, vitals stable, does not appear to be in pain/discomfort. 1900: Gave bedside and verbal shift report to DIAN Camarena.

## 2018-10-07 NOTE — PROGRESS NOTES
[address] [place] [address] 
[phone] Medical Progress Note NAME: Ericka Avila :  1950 MRM:  [id number] Date/Time: 10/7/2018  6:18 AM 
  
Subjective:  
 
Admitted with acute respiratory failure with hypoxemia due to systolic CHF associated with atrial fib with RVR. Bedside EF 15-20%. Chest xray showed moderated CHF. Respiratory failure rapidly progressed with subsequent intubation. Patient on pressors for short period of time. Now on amiodarone drip and propofol. Patient agitated when propofol reduced. Diuresing with IV bumex. Past Medical History:  
Diagnosis Date  Anxiety 2018  Arthritis OA  Asthma  Diabetes ([facility] [address]  Essential hypertension  GERD (gastroesophageal reflux disease)  Hypercholesterolemia 2018  Hypertension  Long-term use of high-risk medication 2018  Neuropathy  Other ill-defined conditions(799.89) IBS, spinal stenosis  Plantar fasciitis  Psychiatric disorder   
 depression, anxiety  Sleep apnea   
 uses CPAP  Type 2 diabetes mellitus with diabetic neuropathy, without long-term current use of insulin ([facility] [address] 2016 ROS:  Patient was not able to provide review of systems due to mental status change/acute illness Objective:  
 
 
Vitals:  
 
  
Last 24hrs VS reviewed since prior progress note. Most recent are: 
 
Visit Vitals  /86  Pulse (!) 123  Temp 99.4 °F (37.4 °C)  Resp 19  
 Ht 5' 9\" (1.753 m)  Wt 238 lb 15.7 oz (108.4 kg)  SpO2 96%  Breastfeeding No  
 BMI 35.29 kg/m2 SpO2 Readings from Last 6 Encounters:  
10/07/18 96% 09/10/18 96% 18 94% 18 96% 18 96% 18 97% O2 Flow Rate (L/min): 15 l/min Intake/Output Summary (Last 24 hours) at 10/07/18 2536 Last data filed at 10/07/18 0400 Gross per 24 hour Intake           714.23 ml Output             2575 ml Net         -1860.77 ml Telemetry reviewed:   AFIB Tubes:   Vargas, Intubated/Vent and OG tube and central line X-Ray:  Admission chest xray - moderated CHF Procedures:   N/A Exam:  
 
General   well developed, well nourished, appears stated age, on vent, agitated Neck   Supple without nodes or mass. No thyromegaly or bruit Respiratory   Anterior rhonchi present Cardiology  irreg irreg with rapid VR Abdominal  Soft, non-tender, non-distended, positive bowel sounds, no hepatosplenomegaly Extremities  1+ LE edema Skin  Normal skin turgor. No rashes or skin ulcers noted Neurological  Moves all 4 extremities Psychological  Agitated on vent/propofol Exam otherwise negative Lab Data Reviewed: (see below) Medications Reviewed: (see below) 
 
______________________________________________________________________ Medications:  
 
Current Facility-Administered Medications Medication Dose Route Frequency  magnesium oxide (MAG-OX) tablet 400 mg  400 mg Oral DAILY  potassium chloride 10 mEq in 50 ml IVPB  10 mEq IntraVENous Q1H  
 aspirin chewable tablet 81 mg  81 mg Oral DAILY  atorvastatin (LIPITOR) tablet 40 mg  40 mg Oral QHS  clonazePAM (KlonoPIN) tablet 0.5 mg  0.5 mg Oral DAILY PRN  
 . PHARMACY TO SUBSTITUTE PER PROTOCOL (Reordered from: potassium gluconate 550 mg (90 mg) tab)    Per Protocol  pregabalin (LYRICA) capsule 400 mg  400 mg Oral BID  venlafaxine (EFFEXOR) tablet 300 mg  300 mg Oral DAILY  sodium chloride (NS) flush 5-10 mL  5-10 mL IntraVENous Q8H  
 sodium chloride (NS) flush 5-10 mL  5-10 mL IntraVENous PRN  
 acetaminophen (TYLENOL) tablet 650 mg  650 mg Oral Q6H PRN  
 docusate sodium (COLACE) capsule 100 mg  100 mg Oral DAILY PRN  
 metoprolol succinate (TOPROL-XL) XL tablet 100 mg  100 mg Oral DAILY  propofol (DIPRIVAN) infusion  0-50 mcg/kg/min IntraVENous TITRATE  amiodarone (CORDARONE) 375 mg/250 mL D5W infusion  0.5-1 mg/min IntraVENous TITRATE  enoxaparin (LOVENOX) partial dose injection 105 mg  105 mg SubCUTAneous Q12H  prochlorperazine (COMPAZINE) with saline injection 5 mg  5 mg IntraVENous Q6H PRN  
 bumetanide (BUMEX) injection 2 mg  2 mg IntraVENous Q12H  
 NOREPINephrine (LEVOPHED) 8 mg in 5% dextrose 250mL infusion  2-16 mcg/min IntraVENous TITRATE  mupirocin (BACTROBAN) 2 % ointment   Both Nostrils BID  chlorhexidine (PERIDEX) 0.12 % mouthwash 15 mL  15 mL Oral BID Lab Review:  
 
Recent Labs 10/07/18 
 0420  10/06/18 
 1026 WBC  10.4  9.5 HGB  11.9  12.9 HCT  36.9  39.7 PLT  187  204 Recent Labs 10/07/18 
 0420  10/06/18 
 1026 NA  146*  145  
K  2.7*  2.8*  
CL  108  108 CO2  29  28 GLU  131*  140* BUN  21*  22* CREA  1.38*  1.35* CA  7.9*  8.4* MG   --   1.9 ALB   --   3.3* TBILI   --   1.0 SGOT   --   17 ALT   --   20 Lab Results Component Value Date/Time Glucose (POC) 97 01/20/2017 07:23 AM  
 Glucose (POC) 127 (H) 01/19/2017 11:03 PM  
 Glucose (POC) 122 (H) 01/19/2017 05:34 PM  
 Glucose (POC) 109 (H) 01/19/2017 04:40 PM  
 Glucose (POC) 102 (H) 01/19/2017 09:53 AM  
 
Recent Labs 10/07/18 
 0357  10/06/18 
 1700  10/06/18 
 1555 PH  7.56*  7.35  7.41  
PCO2  29*  44  39 PO2  74*  84  64* HCO3  26  24  24 FIO2  50  100   -- No results for input(s): INR in the last 72 hours. No lab exists for component: INREXT Assessment:  
 
Principal Problem: 
  Acute respiratory failure with hypoxemia ([facility] (10/6/2018) Active Problems: 
  Type 2 diabetes mellitus with diabetic neuropathy, without long-term current use of insulin ([facility] (6/5/2016) Acute systolic heart failure ([facility] (4/4/2014) Atrial fibrillation with rapid ventricular response ([facility] [address] (10/7/2018) Hypokalemia (10/7/2018) Plan:  
 
Risk of deterioration: high 1. Continue vent/respiratory support 2. Continue IV bumex, amiodarone 3. Runs of KCl ordered - continue to follow and replete 4. Cardiology following - formal echo pending 5. Follow labs Care Plan discussed with: Nursing Staff Discussed:  Care Plan Prophylaxis:  Lovenox and SCD's Disposition:  Unknown 
        
___________________________________________________ Attending Physician: Jerry Whitaker MD

## 2018-10-08 ENCOUNTER — APPOINTMENT (OUTPATIENT)
Dept: GENERAL RADIOLOGY | Age: 68
DRG: 226 | End: 2018-10-08
Attending: INTERNAL MEDICINE
Payer: MEDICARE

## 2018-10-08 LAB
ALBUMIN SERPL-MCNC: 2.8 G/DL (ref 3.5–5)
ALBUMIN/GLOB SERPL: 0.8 {RATIO} (ref 1.1–2.2)
ALP SERPL-CCNC: 104 U/L (ref 45–117)
ALT SERPL-CCNC: 15 U/L (ref 12–78)
ANION GAP SERPL CALC-SCNC: 8 MMOL/L (ref 5–15)
ARTERIAL PATENCY WRIST A: YES
AST SERPL-CCNC: 20 U/L (ref 15–37)
BASE EXCESS BLDA CALC-SCNC: 3.7 MMOL/L
BASOPHILS # BLD: 0 K/UL (ref 0–0.1)
BASOPHILS NFR BLD: 0 % (ref 0–1)
BDY SITE: ABNORMAL
BILIRUB SERPL-MCNC: 1 MG/DL (ref 0.2–1)
BREATHS.SPONTANEOUS ON VENT: 12
BUN SERPL-MCNC: 19 MG/DL (ref 6–20)
BUN/CREAT SERPL: 12 (ref 12–20)
CALCIUM SERPL-MCNC: 7.8 MG/DL (ref 8.5–10.1)
CHLORIDE SERPL-SCNC: 108 MMOL/L (ref 97–108)
CO2 SERPL-SCNC: 28 MMOL/L (ref 21–32)
CREAT SERPL-MCNC: 1.57 MG/DL (ref 0.55–1.02)
DIFFERENTIAL METHOD BLD: ABNORMAL
EOSINOPHIL # BLD: 0.1 K/UL (ref 0–0.4)
EOSINOPHIL NFR BLD: 1 % (ref 0–7)
EPAP/CPAP/PEEP, PAPEEP: 6
ERYTHROCYTE [DISTWIDTH] IN BLOOD BY AUTOMATED COUNT: 18.7 % (ref 11.5–14.5)
FIO2 ON VENT: 40 %
GAS FLOW.O2 SETTING OXYMISER: 12 L/MIN
GLOBULIN SER CALC-MCNC: 3.5 G/DL (ref 2–4)
GLUCOSE SERPL-MCNC: 126 MG/DL (ref 65–100)
HCO3 BLDA-SCNC: 27 MMOL/L (ref 22–26)
HCT VFR BLD AUTO: 36.9 % (ref 35–47)
HGB BLD-MCNC: 11.5 G/DL (ref 11.5–16)
IMM GRANULOCYTES # BLD: 0.1 K/UL (ref 0–0.04)
IMM GRANULOCYTES NFR BLD AUTO: 1 % (ref 0–0.5)
LYMPHOCYTES # BLD: 1.9 K/UL (ref 0.8–3.5)
LYMPHOCYTES NFR BLD: 15 % (ref 12–49)
MAGNESIUM SERPL-MCNC: 2.3 MG/DL (ref 1.6–2.4)
MCH RBC QN AUTO: 28.4 PG (ref 26–34)
MCHC RBC AUTO-ENTMCNC: 31.2 G/DL (ref 30–36.5)
MCV RBC AUTO: 91.1 FL (ref 80–99)
MONOCYTES # BLD: 0.9 K/UL (ref 0–1)
MONOCYTES NFR BLD: 7 % (ref 5–13)
NEUTS SEG # BLD: 9.3 K/UL (ref 1.8–8)
NEUTS SEG NFR BLD: 76 % (ref 32–75)
NRBC # BLD: 0 K/UL (ref 0–0.01)
NRBC BLD-RTO: 0 PER 100 WBC
PCO2 BLDA: 36 MMHG (ref 35–45)
PH BLDA: 7.49 [PH] (ref 7.35–7.45)
PHOSPHATE SERPL-MCNC: 2.7 MG/DL (ref 2.6–4.7)
PLATELET # BLD AUTO: 175 K/UL (ref 150–400)
PMV BLD AUTO: 11.8 FL (ref 8.9–12.9)
PO2 BLDA: 86 MMHG (ref 80–100)
POTASSIUM SERPL-SCNC: 3.5 MMOL/L (ref 3.5–5.1)
PROT SERPL-MCNC: 6.3 G/DL (ref 6.4–8.2)
RBC # BLD AUTO: 4.05 M/UL (ref 3.8–5.2)
SAO2 % BLD: 97 % (ref 92–97)
SAO2% DEVICE SAO2% SENSOR NAME: ABNORMAL
SODIUM SERPL-SCNC: 144 MMOL/L (ref 136–145)
SPECIMEN SITE: ABNORMAL
VENTILATION MODE VENT: ABNORMAL
VT SETTING VENT: 450 ML
WBC # BLD AUTO: 12.3 K/UL (ref 3.6–11)

## 2018-10-08 PROCEDURE — 94003 VENT MGMT INPAT SUBQ DAY: CPT

## 2018-10-08 PROCEDURE — 77030018798 HC PMP KT ENTRL FED COVD -A

## 2018-10-08 PROCEDURE — 85025 COMPLETE CBC W/AUTO DIFF WBC: CPT | Performed by: INTERNAL MEDICINE

## 2018-10-08 PROCEDURE — 84100 ASSAY OF PHOSPHORUS: CPT | Performed by: INTERNAL MEDICINE

## 2018-10-08 PROCEDURE — 65660000000 HC RM CCU STEPDOWN

## 2018-10-08 PROCEDURE — 74011250637 HC RX REV CODE- 250/637: Performed by: INTERNAL MEDICINE

## 2018-10-08 PROCEDURE — 74011250636 HC RX REV CODE- 250/636: Performed by: INTERNAL MEDICINE

## 2018-10-08 PROCEDURE — 83735 ASSAY OF MAGNESIUM: CPT | Performed by: INTERNAL MEDICINE

## 2018-10-08 PROCEDURE — 71045 X-RAY EXAM CHEST 1 VIEW: CPT

## 2018-10-08 PROCEDURE — 36600 WITHDRAWAL OF ARTERIAL BLOOD: CPT | Performed by: INTERNAL MEDICINE

## 2018-10-08 PROCEDURE — 82803 BLOOD GASES ANY COMBINATION: CPT | Performed by: INTERNAL MEDICINE

## 2018-10-08 PROCEDURE — 93306 TTE W/DOPPLER COMPLETE: CPT

## 2018-10-08 PROCEDURE — 80053 COMPREHEN METABOLIC PANEL: CPT | Performed by: INTERNAL MEDICINE

## 2018-10-08 PROCEDURE — 36415 COLL VENOUS BLD VENIPUNCTURE: CPT | Performed by: INTERNAL MEDICINE

## 2018-10-08 PROCEDURE — 74011000250 HC RX REV CODE- 250: Performed by: INTERNAL MEDICINE

## 2018-10-08 RX ORDER — VENLAFAXINE HYDROCHLORIDE 150 MG/1
150 CAPSULE, EXTENDED RELEASE ORAL 2 TIMES DAILY WITH MEALS
COMMUNITY

## 2018-10-08 RX ORDER — ENOXAPARIN SODIUM 100 MG/ML
100 INJECTION SUBCUTANEOUS EVERY 12 HOURS
Status: DISCONTINUED | OUTPATIENT
Start: 2018-10-08 | End: 2018-10-11

## 2018-10-08 RX ADMIN — BUMETANIDE 2 MG: 0.25 INJECTION INTRAMUSCULAR; INTRAVENOUS at 13:16

## 2018-10-08 RX ADMIN — VENLAFAXINE 150 MG: 37.5 TABLET ORAL at 10:32

## 2018-10-08 RX ADMIN — CHLORHEXIDINE GLUCONATE 15 ML: 1.2 RINSE ORAL at 17:28

## 2018-10-08 RX ADMIN — Medication 10 ML: at 05:47

## 2018-10-08 RX ADMIN — PROPOFOL 30 MCG/KG/MIN: 10 INJECTION, EMULSION INTRAVENOUS at 22:41

## 2018-10-08 RX ADMIN — ENOXAPARIN SODIUM 105 MG: 120 INJECTION SUBCUTANEOUS at 05:47

## 2018-10-08 RX ADMIN — ASPIRIN 81 MG CHEWABLE TABLET 81 MG: 81 TABLET CHEWABLE at 10:32

## 2018-10-08 RX ADMIN — AMIODARONE HYDROCHLORIDE 0.5 MG/MIN: 50 INJECTION, SOLUTION INTRAVENOUS at 04:33

## 2018-10-08 RX ADMIN — Medication 400 MG: at 10:32

## 2018-10-08 RX ADMIN — Medication 10 ML: at 20:12

## 2018-10-08 RX ADMIN — MUPIROCIN: 20 OINTMENT TOPICAL at 17:28

## 2018-10-08 RX ADMIN — MUPIROCIN: 20 OINTMENT TOPICAL at 10:34

## 2018-10-08 RX ADMIN — ENOXAPARIN SODIUM 100 MG: 100 INJECTION, SOLUTION INTRAVENOUS; SUBCUTANEOUS at 17:28

## 2018-10-08 RX ADMIN — PROPOFOL 25 MCG/KG/MIN: 10 INJECTION, EMULSION INTRAVENOUS at 17:41

## 2018-10-08 RX ADMIN — BUMETANIDE 2 MG: 0.25 INJECTION INTRAMUSCULAR; INTRAVENOUS at 00:38

## 2018-10-08 RX ADMIN — POTASSIUM CHLORIDE 20 MEQ: 400 INJECTION, SOLUTION INTRAVENOUS at 02:28

## 2018-10-08 RX ADMIN — PROPOFOL 20 MCG/KG/MIN: 10 INJECTION, EMULSION INTRAVENOUS at 00:31

## 2018-10-08 RX ADMIN — POTASSIUM CHLORIDE 20 MEQ: 400 INJECTION, SOLUTION INTRAVENOUS at 04:32

## 2018-10-08 RX ADMIN — PROPOFOL 15 MCG/KG/MIN: 10 INJECTION, EMULSION INTRAVENOUS at 06:06

## 2018-10-08 RX ADMIN — CHLORHEXIDINE GLUCONATE 15 ML: 1.2 RINSE ORAL at 10:33

## 2018-10-08 RX ADMIN — PREGABALIN 200 MG: 100 CAPSULE ORAL at 17:28

## 2018-10-08 RX ADMIN — Medication 10 ML: at 13:14

## 2018-10-08 RX ADMIN — AMIODARONE HYDROCHLORIDE 0.5 MG/MIN: 50 INJECTION, SOLUTION INTRAVENOUS at 17:50

## 2018-10-08 RX ADMIN — PREGABALIN 200 MG: 100 CAPSULE ORAL at 10:32

## 2018-10-08 RX ADMIN — FAMOTIDINE 20 MG: 10 INJECTION, SOLUTION INTRAVENOUS at 10:33

## 2018-10-08 RX ADMIN — PROPOFOL 25 MCG/KG/MIN: 10 INJECTION, EMULSION INTRAVENOUS at 13:10

## 2018-10-08 NOTE — PROGRESS NOTES
Nutrition Assessment: 
 
RECOMMENDATIONS:  
Initiate TF via OGT: 
 Start TwoCal HN @ 10mL/h, advance rate 10mL q 8h as tolerated to Goal Rate of 30mL/h + Prosource daily + 50mL H2O flush q 4h (provides 1500kcals/75gPro/804mL) DIETITIANS INTERVENTIONS/PLAN:  
Initiate TF 
 
ASSESSMENT:  
Pt admitted with acute respiratory failure. PMH: HTN, CKD, DM, GERD. Chart reviewed, case discussed during CCU rounds. Pt intubated and sedated on propofol @ 16.5mL/h, which provides 436 kcals daily. OGT to suction. MST negative for previous nutritional risk factors. No family in the room to speak with. No obvious signs of fat or muscle wasting per visual assessment. Per discussion with shavonne Soriano to start TF.  TF at goal rate + propofol will meet 102% kcal and 99% protein needs. Noted pt being diuresed, will use low volume TF and minimal H2O flushes for now. SUBJECTIVE/OBJECTIVE:  
Pt intubated and sedated Diet Order: NPO 
% Eaten:  No data found. Pertinent Medications:bumex, pepcid, magox; Drips: propofol. Chemistries: 
Lab Results Component Value Date/Time Sodium 144 10/08/2018 08:28 AM  
 Potassium 3.5 10/08/2018 08:28 AM  
 Chloride 108 10/08/2018 08:28 AM  
 CO2 28 10/08/2018 08:28 AM  
 Anion gap 8 10/08/2018 08:28 AM  
 Glucose 126 (H) 10/08/2018 08:28 AM  
 BUN 19 10/08/2018 08:28 AM  
 Creatinine 1.57 (H) 10/08/2018 08:28 AM  
 BUN/Creatinine ratio 12 10/08/2018 08:28 AM  
 GFR est AA 40 (L) 10/08/2018 08:28 AM  
 GFR est non-AA 33 (L) 10/08/2018 08:28 AM  
 Calcium 7.8 (L) 10/08/2018 08:28 AM  
 AST (SGOT) 20 10/08/2018 08:28 AM  
 Alk.  phosphatase 104 10/08/2018 08:28 AM  
 Protein, total 6.3 (L) 10/08/2018 08:28 AM  
 Albumin 2.8 (L) 10/08/2018 08:28 AM  
 Globulin 3.5 10/08/2018 08:28 AM  
 A-G Ratio 0.8 (L) 10/08/2018 08:28 AM  
 ALT (SGPT) 15 10/08/2018 08:28 AM  
  
Anthropometrics: Height: 5' 9\" (175.3 cm) Weight: 104.4 kg (230 lb 2.6 oz) [x]bed scale (10/8)   []stated   []unknown IBW (%IBW): 65.9 kg (145 lb 4.5 oz) ( ) UBW (%UBW):   (  %) BMI: Body mass index is 33.99 kg/(m^2). This BMI is indicative of: 
[]Underweight   []Normal   []Overweight   [x] Obesity   [] Extreme Obesity (BMI>40) Estimated Nutrition Needs (Based on): 1906 Kcals/day [place] [place] [phone][place] , 76 g (1gPro/kg ABW ) Protein Carbohydrate: At Least 130 g/day  Fluids: 1200 mL/day or per MD  
 
Last BM: PTA   []Active     []Hyperactive  [x]Hypoactive       [] Absent   BS Skin:    [x] Intact   [] Incision  [] Breakdown   [] DTI   [] Tears/Excoriation/Abrasion  [x]Edema(+1 pitting-BLE; trace-nonpitting-BUE)  [] Other: Wt Readings from Last 30 Encounters:  
10/08/18 104.4 kg (230 lb 2.6 oz) 09/10/18 107.5 kg (237 lb) 08/07/18 105.7 kg (233 lb) 08/01/18 108 kg (238 lb)  
03/14/18 118 kg (260 lb 3 oz) 02/22/18 119.3 kg (263 lb)  
01/22/18 104.6 kg (230 lb 9.6 oz) 09/08/17 115.3 kg (254 lb 3.1 oz) 01/19/17 99.8 kg (220 lb) 10/12/16 106.6 kg (235 lb)  
07/26/16 110.2 kg (243 lb) 07/02/16 111.9 kg (246 lb 11.1 oz) 06/28/16 112.3 kg (247 lb 9.6 oz) 06/15/16 114.3 kg (252 lb 1 oz) 04/19/16 112.5 kg (248 lb)  
01/17/16 104.3 kg (230 lb)  
02/24/15 104.8 kg (231 lb)  
08/25/14 103.9 kg (229 lb)  
07/17/14 108 kg (238 lb) 04/09/14 116.8 kg (257 lb 8 oz)  
11/20/13 113.4 kg (250 lb)  
02/22/13 113.4 kg (250 lb) 05/14/10 109 kg (240 lb 4.8 oz) NUTRITION DIAGNOSES:  
Problem:  Inadequate protein-energy intake Etiology: related to pt NPO 2' vent Signs/Symptoms: as evidenced by NPO + propofol meets <25% kcal and 0% protein needs. NUTRITION INTERVENTIONS: 
  Enteral/Parenteral Nutrition: Initiate enteral nutrition GOAL:  
Pt will tolerate TF @ goal rate with residuals <250mL in 2-4 days. Cultural, Buddhist, or Ethnic Dietary Needs: None LEARNING NEEDS (Diet, Food/Nutrient-Drug Interaction):  
 [x] None Identified [] Identified and Education Provided/Documented 
 [] Identified and Pt declined/was not appropriate [x] Interdisciplinary Care Plan Reviewed/Documented  
 [x] Participated in Discharge Planning: Unable to determine  
 [x] Interdisciplinary Rounds NUTRITION RISK:  
 [x] High              [] Moderate           []  Low  []  Minimal/Uncompromised PT SEEN FOR:  
 [x]  MD Consult: []Calorie Count []Diabetic Diet Education []Diet Education []Electrolyte Management []General Nutrition Management and Supplements [x]Management of Tube Feeding []TPN Recommendations []  RN Referral:  []MST score >=2 
   []Enteral/Parenteral Nutrition PTA []Pregnant: Gestational DM or Multigestation  
[]  Low BMI []  Re-Screen  
[]  LOS []  NPO/clears x 5 days []  New TF/TPN Una Favre, RD, [facility] Pager [phone] Weekend Pager [phone]

## 2018-10-08 NOTE — PROGRESS NOTES
0730 Report received from night nurse, Catrina Barnett, DIAN. Assumed care of patient. 4633 Patient restless and biting the ETT. Propofol increased to 25 mcg. 1000 Patient resting quietly. 1200 Opens eyes to name, nods head appropriately to questions. No change in assessment. 1600 Assessment remains unchanged. 1900 Report given to oncoming shift nurse, Catrina Barnett, DIAN.

## 2018-10-08 NOTE — PROGRESS NOTES
Critical care interdisciplinary rounds held on 10/08/2018. Following members present, Pharmacy, Diabetes Treatment, Case Management, Respiratory Therapy, Physical Therapy  and Nutrition. Led by Anne Brian RN and Dr. Myranda Arnold and Dr. Collette Fells. Plan of care discussed. See clinical pathway for plan of care and interventions and desired outcomes.

## 2018-10-08 NOTE — PROGRESS NOTES
Cardiology Progress Note 
10/8/2018    Admit Date: 10/6/2018 Admit Diagnosis: Acute respiratory failure (HCC)  CC:  None currently Cardiac Assessment/Plan:  
Ms Tani Damon has a h/o: 
1) CAD (LAD ISELA 2014 for NQMI), Neg PET for ischemia 9/2018. 
2) mixed ischemic/tachycardia mediated CM 4/2014 (EF 20%); EF 45% 11/2017. Lauren Furlough was too expensive. 3) HTN, 4) PAfib (RFA 4/2016 and 1/2017), Recurrrent/persistent afib 2017/2018 5) DM,  
6) SHAYLA (on CPAP),  
7) CKD (Cr 1.5 range). Aldactone stopped in 2014. 8) St Don PPM 7/2014 for bradycardia while on therapy for AFib. CAD: LAD ISELA 2014; recent PET w/o ischemia but recurrent low EF. CM: Echo pending; EF 16% on PET (but in afib). If cant diurese well, may need milrinone. HTN: off metoprolol/lisinopril with low BPs after ETT/sedation: Levophed off since evening of 10/7; change to prn Jose. Rhythm: PAFib; back in sinus; on IV amio; Dr Jeremy Valverde to see (considering AV node ablation/BiV PM or ICD). Renal function: Cr up slightly yesterday; (Cr 1.3 range typically): pending for today. Resp failure, SHAYLA, DM, Dispo: per priamry team.  
For other plans, see orders. @ OV 10/2/18: 
Ms Tani Damon has a h/o: 
1) CAD (LAD ISELA 2014 for NQMI), Neg PET for ischemia 9/2018. 
2) mixed ischemic/tachycardia mediated CM 4/2014 (EF 20%); EF 45% 11/2017. Lauren Furlough was too expensive. 3) HTN, 4) PAfib (RFA 4/2016 and 1/2017), Recurrrent/persistent afib 2017/2018 5) DM,  
6) SHAYLA (on CPAP),  
7) CKD (Cr 1.5 range). Aldactone stopped in 2014. 8) St Don PPM 7/2014 for bradycardia while on therapy for AFib. 
 
11/2017: No CP/STEEN; Back in afib today:  Coreg changed Toprol-XL. 10/2018:  Recent ER visit with epigastric pain; negative evaluation there & seen by Dr. Aditya Grant who set up a cardiac PET. The PET shows no ischemia but EF suggested at 16%.   Of note patient does have AFib with accelerated ventricular response. No recurrence of epigastric discomfort. No bleeding. Had a simple trip at home a few weeks ago without injury. IMPRESSION AND PLAN 
  
01. (I25.10) Atherosclerotic heart disease of native coronary artery without angina pectoris:  No anginal symptoms. Cont asa and stopped plavix with need for anticoagulation. We discussed the signs and symptoms of unstable angina, myocardial infarction and malignant arrhythmia. The patient knows to seek immediate medical attention should they occur. ECG done today 02. (I42.0) Dilated cardiomyopathy:  Mixed ischemic/non-ischemic (tachycardia mediated) CM. Her ejection fraction is greater than 35% after repeat echo. The patient's systolic function has been stable. I suspect the PET has a falsely low EF related to her AFib but repeat echo is needed. 2-D w/CFD Echo to be done first available. 03. (I50.42) Chronic combined systolic (congestive) and diastolic (congestive) heart failure:  Resolved exertional symptoms. (NYHA I)  The patient is compliant with their CHF regimen. 04. (I48.1) Persistent atrial fibrillation:  Management per Dr Doug Garcia; currently off amio and on Pradaxa. Heart rate has been to elevated; increase Toprol  q.day. Further AFib monitoring/therapy per Dr. Antonio Villasenor. 05. (I13.0) Hyp hrt & chr kdny dis w hrt fail and stg 1-4/unsp chr kdny:  Adequately controlled. We will see how the patient does with the med changes noted above. 06. (E78.2) Mixed hyperlipidemia:  Lipid labs drawn by PCP. The patient is tolerating lipid lowering therapy well. 07. (N18.3) Chronic kidney disease, stage 3 (moderate):  Cr 1.24/GFR46 11/2015. Cr 1.32/GFR 43 1/2017.  
08. (R60.0) Localized edema:  Resolved. She avoids salt. 09. (E11.69) Type 2 diabetes mellitus with other specified complication:  Lipids & HTN as noted above; DM managed by other MD.  
10. (Z95.0) Presence of cardiac pacemaker:  will continue to be followed in device clinic. 11. (Z79.82) Long term (current) use of aspirin:  This condition is stable. 12. (Z79.01) Long term (current) use of anticoagulants: This condition is stable. Indicated for atrial fibrillation. No bleeding. If she has recurrent falls, she knows to call for re-evaluation of anticoagulation. 13. (Z68.38) Body mass index (BMI) 38.0-38.9, adult:  The patient was instructed on AHA diet and regular exercise. ORDERS: 
    
1 ECG done today 2 2-D w/ CFD Echocardiogram first available. 3 Return office visit with Delfino Little MD in 6 Months. 4 The patient was instructed on AHA diet and regular exercise. 10/2/18 MEDICATION LIST Medication Sig Description  
amlodipine 5 mg tablet take 1 tablet by oral route  every day per pc  
aspirin 81 mg tablet,delayed release take 1 tablet by oral route  every day  
atorvastatin 40 mg tablet take 1 tablet by oral route  every evening  
bumetanide 1 mg tablet 1 in am on tues&thursday 2 tablets on mon wed fridays Calcium 600 600 mg (1,500 mg) tablet daily  
clonazepam 0.5 mg tablet take 1 tablet by oral route  every day LISINOPRIL 20 MG Tablet TAKE 1 TABLET EVERY DAY Lyrica 200 mg capsule take 1 capsule by oral route 2 times every day  
magnesium 250 mg tablet take 1 tablet by oral route  every day  
metoprolol succinate  mg tablet,extended release 24 hr take 1 tablet by oral route  every day  
potassium gluconate 500 mg (83 mg) tablet daily Pradaxa 150 mg capsule take 1 capsule by oral route 2 times every day Premarin 0.625 mg/gram vaginal cream insert 0.5 applicatorful by vaginal route  every day cyclically, 3 weeks on and 1 week off  
venlafaxine 75 mg tablet 2 po bid Vitamin D3 1,000 unit capsule take 1 daily  
 
____________________________________________________________________________________________________ CARDIAC HISTORY 
CAD: 
   
1 NSTEMI [95% Proximal LAD, Resolute (ISELA) to the Proximal LAD.] - 4/8/2014 CHF/CM: 
   
1 Mixed ischemic/tachycardic CM. [Nl TSH.] - 4/2014  
2 Ischemic & tachycardic Cardiomyopathy [Echo (EF 0.45) - 06/17/2014, (prev EF 20-30%)] - 6/2014 ARRHYTHMIA: 
   
1 A Fib [DCCV, Plan: amio started; Eliquis with Effient (change eliquis to asa if NSR after 30 days). ] - 4/8/2014  
2 PAF - 8/2010  
3 A Fib, recurrent; and 6/2014. [Had marked sinus bradycardia while on bblocker/dig.] - 5/2014  
4 Sinus Bradycardia. - 6/2/2014  
5 PPM (Devices (Dual Chamber PPM (Powderly Martin)) - 7/17/2014) - 7/17/2014  
6 PAF [CVR; Eliquis restarted (plavix stopped). ] - 9/2015  
7 A Fib [RFA] - 4/2016  
8 A Fib [RFA] - 1/2017 RISK FACTORS: 
   
1 Diabetes [Diagnosed in 2014] 2 Hypertension 3 Dyslipidemia CARDIOVASCULAR PROCEDURES Procedure Date Results Cardiac PET 09/24/2018 EF 0.16 (16%), physiologic apical thinning with no ischemia Cath 04/08/2014 95% Proximal LAD, Resolute (ISELA) to the Proximal LAD. DCCV 04/08/2014 Initial Rhythm A Fib, Final Rhythm Sinus Devices 07/17/2014 Dual Chamber PPM (Powderly Martin) Echo 11/28/2017 EF 0.45 (45%), Mild Global Hypo, Mild TR, Mild LVH, Mild MR, Est RVSP 41 mmHg, Normal RV. (probable trileaflet AoV). Echo 10/30/2015 EF 0.45 (45%), Mild LVH, LA Diam 4.1 cm, Est RVSP 35 mmHg, Normal RV. ?bicuspid AoV; (Prob trileaflet on previous ANGELITO). Echo 06/17/2014 EF 0.45 (45%), EF range 45%-50%, Mild LV dilatation. Mild LV systolic dysfunction. ? Bicuspid AoV but prob trileafet on previous ANGELITO. Echo 04/03/2014 EF 0.20 (20%), global HK with lateral sparing; no valve disease. EKG 10/02/2018 Atrial Fib, ; nonspecific T-wave flattening. Holter 06/09/2014 No Malignant Arrhythmias, Atrial Fib, No correlation with symptoms, (, [address] Holter 04/30/2014 No Malignant Arrhythmias, Atrial Fib, No correlation with symptoms, \"light or heavy chest\" = afib in 60s. HR  (ave 63). ASx pauses (upto 2.8) while asleep. RFA 2017 Indication A Fib, Final Rhythm Sinus RFA  Indication A Fib Sleep Study  OSAS: Moderate ANGELITO 2014 EF 0.35 (35%), No BRAYDON Clot, prob trileaflet AoV. ACTIVE ALLERGIES: 
Ingredient Reaction Comment SACUBITRIL Irene Fail is too expensive SULFA (SULFONAMIDE ANTIBIOTICS) AMOXICILLIN TRIHYDRATE    
POTASSIUM CLAVULANATE ACTIVE INTOLERANCES: 
Description Reaction Comment SACUBITRIL Irene Fail is too expensive PROBLEM LIST: 
Problem Description Chronic Benign essential HTN Y  
DM type 2 N A-fib Y  
CAD Y Mixed hyperlipidemia Y  
 
 
PAST MEDICAL/SURGICAL HISTORY  (Detailed) Disease/disorder Onset Date Management Date Comments  
  hysterectomy A-FIB Cardiovascular disease CHF Hyperlipidemia Hypertension SHAYLA: moderate. 2014 Family History  (Detailed) Relationship Family Member Name  Age at Death Condition Onset Age Cause of Death Brother    Hypertension  N Mother    PAD  N Mother    Hypertension  N Mother    Cardiac arrhythmias  N Sister    Diabetes mellitus  N  
 
SOCIAL HISTORY  (Detailed) Preferred language is [place]. EDUCATION/EMPLOYMENT/OCCUPATION Employment History Status Retired Restrictions  
  retired    
  retired MARITAL STATUS/FAMILY/SOCIAL SUPPORT Currently . High complexity decision making was performed  X Yes High-risk of decompensation with multiple organ involvement X Yes Hospital problem list  
Active Hospital Problems Diagnosis Date Noted  Atrial fibrillation with rapid ventricular response ([address] 10/07/2018  Hypokalemia 10/07/2018  Acute respiratory failure with hypoxemia ([address] 10/06/2018  Type 2 diabetes mellitus with diabetic neuropathy, without long-term current use of insulin ([address] 2016  Acute systolic heart failure ([facility] [address] 2014 Subjective:  Patient reports  [x]   nothing; unable to communicate    [x]   intubated Chest pain  none  consistent with:  Non-cardiac CP Atypical CP None now  On going  Anginal CP Dyspnea  none  at rest  with exertion    
    improved  unchanged  worse PND  none  overnight Orthopnea  none  improved  unchanged  worse Presyncope  none  improved  unchanged  worse Ambulated in hallway without symptoms   Yes Ambulated in room without symptoms  Yes ROS Hematuria:  Yes X No Dysuria:  Yes X No  
            
(2+  other systems) Cough: Yes X No Sputum: Yes X No  
            
 BRBPR:  Yes X No Melena: Yes X No  
No change in family and social history from H&P/Consult note. Objective:  
 Physical Exam: 
24 hr VS reviewed, overall VSSAF Temp (24hrs), Av.3 °F (37.4 °C), Min:98.3 °F (36.8 °C), Max:101 °F (38.3 °C) Patient Vitals for the past 8 hrs: 
 Pulse 10/08/18 0700 63  
10/08/18 0630 60  
10/08/18 0600 62  
10/08/18 0530 63  
10/08/18 0500 63  
10/08/18 0430 67  
10/08/18 0400 69  
10/08/18 0341 68  
10/08/18 0330 70  
10/08/18 0300 70  
10/08/18 0230 70  
10/08/18 0200 67  
10/08/18 0130 66  
10/08/18 0100 65  
10/08/18 0030 62 Patient Vitals for the past 8 hrs: 
 Resp 10/08/18 0700 13  
10/08/18 0630 12  
10/08/18 0600 14  
10/08/18 0530 14  
10/08/18 0500 15  
10/08/18 0430 25  
10/08/18 0400 17  
10/08/18 0341 19  
10/08/18 0330 19  
10/08/18 0300 18  
10/08/18 0230 17  
10/08/18 0200 16  
10/08/18 0130 15  
10/08/18 0100 17  
10/08/18 0030 14 Patient Vitals for the past 8 hrs: 
 BP  
10/08/18 0700 127/74  
10/08/18 0630 121/71  
10/08/18 0600 107/61  
10/08/18 0530 108/56  
10/08/18 0500 115/66  
10/08/18 0400 131/72  
10/08/18 0300 136/77  
10/08/18 0230 139/76  
10/08/18 0200 130/70  
10/08/18 0130 130/74  
10/08/18 0100 128/75 10/08/18 0030 121/68 Intake/Output Summary (Last 24 hours) at 10/08/18 6642 Last data filed at 10/08/18 0700 Gross per 24 hour Intake          3891.93 ml Output             2045 ml Net          1846.93 ml General:  WD,WN  Elderly  Cachetic  NAD Agitated  Lethargic  Arousable  Obtunded  
 x Sedated  On Bipap x Intubated ENT/Palate: X WNL  Dry MM  anicteric Respiratory: X CTA; dec at bases x Nl resp effort  Increased effort  No significant change  
  rhonchi  rales  improved  worse Cardiovasc: X RRR  IRRR X Nl S1, S2 x No rub No murmur X No new murmur  Murmur c/w: x No gallop  
 x Min edema  BLE edema:+  RLE edema:+  LLE edema:+ Edema less  Edema more  Edema same  Edema worse X Nl JVP  Elevated JVP  JVP same  JVP worse X Carotid wnl x abd aorta not palpated X no peripheral emboli noted GI: X abd soft x nondistended X BS present X No organo- megaly noted Skin: X Warm, dry  Cold extremites Neuro:  A/O  Grossly non- focal  Obtunded x Sedated Lethargic  Arousable x intubated    
 
cath site intact w/o hematoma or new bruit; distal pulse unchanged  Yes Data Review:    
Telemetry independently reviewed x sinus  chronic afib x parox afib  NSVT  
 
ECG independently reviewed  NSR  afib  no significant changes  NSST-Tw chgs  
x no new ECG provided for review Lab results reviewed as noted below. Current medications reviewed as noted below. Recent Labs 10/08/18 
 9903  10/07/18 
 4113 PH  7.49*  7.56* PCO2  36  29* PO2  86  74* Recent Labs 10/06/18 
 1731  10/06/18 
 1026 TROIQ  0.05*  <0.05 Recent Labs 10/07/18 
 1759  10/07/18 
 0420  10/06/18 
 1026 NA  142  146*  145  
K  2.9*  2.7*  2.8*  
CL  106  108  108 CO2  28  29  28 BUN  19  21*  22* CREA  1.54*  1.38*  1.35* GFRAA  41*  46*  48* GLU  155*  131*  140* CA  7.8*  7.9*  8.4*  
 ALB   --    --   3.3* WBC   --   10.4  9.5 HGB   --   11.9  12.9 HCT   --   36.9  39.7 PLT   --   187  204 Recent Labs 10/06/18 
 1026 SGOT  17  
AP  127* TBILI  1.0 TP  6.9 ALB  3.3*  
GLOB  3.6 No results for input(s): INR, PTP, APTT in the last 72 hours. No lab exists for component: INREXT No results for input(s): FE, TIBC, PSAT, FERR in the last 72 hours. Lab Results Component Value Date/Time Glucose (POC) 97 01/20/2017 07:23 AM  
 Glucose (POC) 127 (H) 01/19/2017 11:03 PM  
 Glucose (POC) 122 (H) 01/19/2017 05:34 PM  
 Glucose (POC) 109 (H) 01/19/2017 04:40 PM  
 Glucose (POC) 102 (H) 01/19/2017 09:53 AM  
 
 
Current Facility-Administered Medications Medication Dose Route Frequency  magnesium oxide (MAG-OX) tablet 400 mg  400 mg Oral DAILY  famotidine (PF) (PEPCID) 20 mg in sodium chloride 0.9% 10 mL injection  20 mg IntraVENous Q24H  
 venlafaxine (EFFEXOR) tablet 150 mg  150 mg Oral DAILY  fentaNYL citrate (PF) injection  mcg   mcg IntraVENous Q1H PRN  pregabalin (LYRICA) capsule 200 mg  200 mg Oral BID  aspirin chewable tablet 81 mg  81 mg Oral DAILY  atorvastatin (LIPITOR) tablet 40 mg  40 mg Oral QHS  clonazePAM (KlonoPIN) tablet 0.5 mg  0.5 mg Oral DAILY PRN  
 sodium chloride (NS) flush 5-10 mL  5-10 mL IntraVENous Q8H  
 sodium chloride (NS) flush 5-10 mL  5-10 mL IntraVENous PRN  
 acetaminophen (TYLENOL) tablet 650 mg  650 mg Oral Q6H PRN  
 docusate sodium (COLACE) capsule 100 mg  100 mg Oral DAILY PRN  propofol (DIPRIVAN) infusion  0-50 mcg/kg/min IntraVENous TITRATE  amiodarone (CORDARONE) 375 mg/250 mL D5W infusion  0.5-1 mg/min IntraVENous TITRATE  enoxaparin (LOVENOX) partial dose injection 105 mg  105 mg SubCUTAneous Q12H  prochlorperazine (COMPAZINE) with saline injection 5 mg  5 mg IntraVENous Q6H PRN  
 bumetanide (BUMEX) injection 2 mg  2 mg IntraVENous Q12H  NOREPINephrine (LEVOPHED) 8 mg in 5% dextrose 250mL infusion  2-16 mcg/min IntraVENous TITRATE  mupirocin (BACTROBAN) 2 % ointment   Both Nostrils BID  chlorhexidine (PERIDEX) 0.12 % mouthwash 15 mL  15 mL Oral BID Duarte Reyes MD

## 2018-10-08 NOTE — PROGRESS NOTES
1915  Received bedside/verbal report and assumed care of patient from Shankar Andino RN. Drips verified: Amiodarone at 1 mg/min and Propofol at 15 mcg/kg/min. 1937  Patient is very agitated, biting on ET tube, sliding herself down in the bed, and gagging. Titrating Propofol to achieve desired sedation and ventilation. 2000  Shift assessment completed. See doc flowsheet for details. 2100  Amiodarone gtt decreased to 0.5 mg/min per order. 2109  Daughter called for update, provided correct access code. Update given. 0520  Propofol stopped for SAT. 2299  Patient is becoming increasingly agitated, sitting up in the bed, yanking on restraints, and gagging violently on ET tube. Verbal redirection and therapeutic presence unsuccessful. Propofol restarted at half dose per protocol. RT notified. 
 
Miah Last  Patient remains restless and continuously gagging on ET tube. Increased Propofol to 15 mcg/kg/min. 
 
0615  Son and daughter called for updated, provided correct access code. Update given. 0715  Bedside and Verbal shift change report given to [facility] ERICFEDERICO (oncoming nurse) by Jessica Bowen (offgoing nurse). Report included the following information SBAR, Kardex, ED Summary, Intake/Output, MAR, Recent Results and Cardiac Rhythm Afib. Drips verified: Amio at 0.5 mg/min and Propofol at 15 mcg/kg/min.

## 2018-10-08 NOTE — PROGRESS NOTES
1915  Received bedside/verbal report and assumed care of patient from Audrey Forte RN. Drips verified: Amiodarone at 0.5 mg/min and Propofol at 25 mcg/kg/min. 2000  Shift assessment completed. See doc flowsheet for details. 2010  Patient is very restless and will not stop biting ET tube despite redirection. Increased Propofol to 30 mcg/kg/min. 0144  Patient converted back to Afib at 0100. Have been monitoring HR since goal is to keep HR < 110. Patient is now sustaining in the 120-130 range. Paged cardiology to notify, Ariadna Cuellar on call. Binh Ambriz returned page. Received order to administer 150 mg  Amiodarone bolus and maintain drip rate at 0.5 mg/min after bolus. 0200  TF increased to 20 mL/hr per order. 0206  Amio bolus started. 0222  Amio bolus completed. 0230  Patient's HR has remained elevated, but is now in the 110's. Will continue to monitor. 7683  Patient's Potassium is 2.9 and she has persistent ectopy. Attending paged to notify,  on call. 2451   returned page. Received orders for replacement. 0500  Patient's catheter appears to be leaking as the bed is saturated with urine. Patient also appears to have had a diaphoretic episode that saturated the top of the bed. Patient bathed and linen changed. 0502  Propofol stopped for SAT. Tube feeding stopped in anticipation of potential extubation. 7686   at bedside. Updated on last night's events. Requested Potassium replacement protocol due to chronic low potassium, MD to enter orders. 7821  Patient has progressively become more agitated. She is biting on her ET tube, RR is > 35, and HR is > 140. Verbal redirection unsuccessful. Propofol restarted at half dose of 15 mcg/kg/min per protocol. TF also restarted. 0700  Bedside and Verbal shift change report given to Raad Petersen (oncoming nurse) by Elizabeth Parr (offgoing nurse).  Report included the following information SBAR, Kardex, ED Summary, Intake/Output, MAR, Recent Results and Cardiac Rhythm Afib. Drips verified: Amiodarone at 0.5 mg/min and Propofol at 20 mcg/kg/min.

## 2018-10-08 NOTE — PROGRESS NOTES
[address] [place] [address] 
[phone] Medical Progress Note NAME: Tabitha Kebede :  1950 MRM:  [id number] Date/Time: 10/8/2018  5:40 AM 
  
Subjective:  
 
Admitted with acute respiratory failure with hypoxemia due to systolic CHF associated with atrial fib with RVR. Bedside EF 15-20%. Chest xray showed moderate CHF. Respiratory failure rapidly progressed with subsequent intubation. Patient on pressors for short period of time. Currently on amiodarone drip and propofol. Converted to paced rhythm yesterday afternoon. Continuing to diurese. K+ remains low (2.9) despite aggressive replacement. Past Medical History:  
Diagnosis Date  Anxiety 2018  Arthritis OA  Asthma  Diabetes ([facility]  Essential hypertension  GERD (gastroesophageal reflux disease)  Hypercholesterolemia 2018  Hypertension  Long-term use of high-risk medication 2018  Neuropathy  Other ill-defined conditions(799.89) IBS, spinal stenosis  Plantar fasciitis  Psychiatric disorder   
 depression, anxiety  Sleep apnea   
 uses CPAP  Type 2 diabetes mellitus with diabetic neuropathy, without long-term current use of insulin ([facility] 2016 ROS:  Patient was not able to provide review of systems due to mental status change/acute illness Objective:  
 
 
Vitals:  
 
  
Last 24hrs VS reviewed since prior progress note. Most recent are: 
 
Visit Vitals  /77  Pulse 68  Temp 99.2 °F (37.3 °C)  Resp 19  
 Ht 5' 9\" (1.753 m)  Wt 230 lb 2.6 oz (104.4 kg)  SpO2 98%  Breastfeeding No  
 BMI 33.99 kg/m2 SpO2 Readings from Last 6 Encounters:  
10/08/18 98% 09/10/18 96% 18 94% 18 96% 18 96% 18 97% O2 Flow Rate (L/min): 15 l/min Intake/Output Summary (Last 24 hours) at 10/08/18 0540 Last data filed at 10/08/18 0400 Gross per 24 hour Intake          3761.68 ml Output             2293 ml Net          1468.68 ml Telemetry reviewed:   paced Tubes:   Vargas, Intubated/Vent and OG tube and central line X-Ray:  Admission chest xray - moderated CHF Procedures:   N/A Exam:  
 
General   well developed, well nourished, appears stated age, on vent, agitated Neck   Supple without nodes or mass. No thyromegaly or bruit Respiratory   Anterior rhonchi present Cardiology  RRR without murmur Abdominal  Soft, non-tender, non-distended, positive bowel sounds, no hepatosplenomegaly Extremities  Without LE edema Skin  Normal skin turgor. No rashes or skin ulcers noted Neurological On propofol Psychological  On propofol Exam otherwise negative Lab Data Reviewed: (see below) Medications Reviewed: (see below) 
 
______________________________________________________________________ Medications:  
 
Current Facility-Administered Medications Medication Dose Route Frequency  magnesium oxide (MAG-OX) tablet 400 mg  400 mg Oral DAILY  famotidine (PF) (PEPCID) 20 mg in sodium chloride 0.9% 10 mL injection  20 mg IntraVENous Q24H  
 venlafaxine (EFFEXOR) tablet 150 mg  150 mg Oral DAILY  fentaNYL citrate (PF) injection  mcg   mcg IntraVENous Q1H PRN  pregabalin (LYRICA) capsule 200 mg  200 mg Oral BID  potassium chloride 20 mEq in 50 ml IVPB  20 mEq IntraVENous Q1H  
 aspirin chewable tablet 81 mg  81 mg Oral DAILY  atorvastatin (LIPITOR) tablet 40 mg  40 mg Oral QHS  clonazePAM (KlonoPIN) tablet 0.5 mg  0.5 mg Oral DAILY PRN  
 sodium chloride (NS) flush 5-10 mL  5-10 mL IntraVENous Q8H  
 sodium chloride (NS) flush 5-10 mL  5-10 mL IntraVENous PRN  
 acetaminophen (TYLENOL) tablet 650 mg  650 mg Oral Q6H PRN  
 docusate sodium (COLACE) capsule 100 mg  100 mg Oral DAILY PRN  propofol (DIPRIVAN) infusion  0-50 mcg/kg/min IntraVENous TITRATE  amiodarone (CORDARONE) 375 mg/250 mL D5W infusion  0.5-1 mg/min IntraVENous TITRATE  enoxaparin (LOVENOX) partial dose injection 105 mg  105 mg SubCUTAneous Q12H  prochlorperazine (COMPAZINE) with saline injection 5 mg  5 mg IntraVENous Q6H PRN  
 bumetanide (BUMEX) injection 2 mg  2 mg IntraVENous Q12H  
 NOREPINephrine (LEVOPHED) 8 mg in 5% dextrose 250mL infusion  2-16 mcg/min IntraVENous TITRATE  mupirocin (BACTROBAN) 2 % ointment   Both Nostrils BID  chlorhexidine (PERIDEX) 0.12 % mouthwash 15 mL  15 mL Oral BID Lab Review:  
 
Recent Labs 10/07/18 
 0420  10/06/18 
 1026 WBC  10.4  9.5 HGB  11.9  12.9 HCT  36.9  39.7 PLT  187  204 Recent Labs 10/07/18 
 1759  10/07/18 
 0420  10/06/18 
 1026 NA  142  146*  145  
K  2.9*  2.7*  2.8*  
CL  106  108  108 CO2  28  29  28 GLU  155*  131*  140* BUN  19  21*  22* CREA  1.54*  1.38*  1.35* CA  7.8*  7.9*  8.4* MG   --    --   1.9 ALB   --    --   3.3* TBILI   --    --   1.0 SGOT   --    --   17 ALT   --    --   20 Lab Results Component Value Date/Time Glucose (POC) 97 01/20/2017 07:23 AM  
 Glucose (POC) 127 (H) 01/19/2017 11:03 PM  
 Glucose (POC) 122 (H) 01/19/2017 05:34 PM  
 Glucose (POC) 109 (H) 01/19/2017 04:40 PM  
 Glucose (POC) 102 (H) 01/19/2017 09:53 AM  
 
Recent Labs 10/07/18 
 0357  10/06/18 
 1700  10/06/18 
 1555 PH  7.56*  7.35  7.41  
PCO2  29*  44  39 PO2  74*  84  64* HCO3  26  24  24 FIO2  50  100   -- No results for input(s): INR in the last 72 hours. No lab exists for component: INREXT, INREXT Assessment:  
 
Principal Problem: 
  Acute respiratory failure with hypoxemia ([address] (10/6/2018) Active Problems: 
  Type 2 diabetes mellitus with diabetic neuropathy, without long-term current use of insulin ([facility] (6/5/2016) Acute systolic heart failure ([facility] (4/4/2014) Atrial fibrillation with rapid ventricular response ([facility] (10/7/2018) Hypokalemia (10/7/2018) Plan:  
 
Risk of deterioration: high 1. Continue vent/respiratory support 2. Continue IV bumex, amiodarone 3. Continue aggressive K+ replacement 4. Cardiology following - formal echo pending 5. Follow labs Care Plan discussed with: Nursing Staff Discussed:  Care Plan Prophylaxis:  Lovenox and SCD's Disposition:  Unknown 
        
___________________________________________________ Attending Physician: Danny Crooks MD

## 2018-10-08 NOTE — PROGRESS NOTES
[facility], Critical Care, and Sleep Medicine Name: Aníbal Toro MRN: [id number] : 1950 Hospital: [facility] Date: 10/8/2018 IMPRESSION:  
· Acute hypoxic respiratory failure · Acute pulmonary edema · Acute/chronic systolic/diastolic heart failure - LVEF 15% · Ischemic cardiomyopathy · SSS with pacemaker · Chronic afib, now in RVR · DM 
· CKD · SHAYLA · Critically ill, moderate to high risk of decompensation. ON Vent support. On propofol and amiodarone infusions. 35 min CC, EOP. PLAN:  
· Ventilator support - adjust for ABG · Diuretics · Amiodarone drip · Ideally would add back her home metoprolol, but difficult with her decompensation in heart failure a 
· Insulin/glycemic monitoring · Monitor creatinine · Pain control/sedation as needed · DVT/GI prophylaxis Subjective/Interval History:  
I have reviewed the flowsheet and previous days notes. The patient is unable to give any meaningful history or review of systems because the patient is: Intubated/sedated - presented overnight with respiratory failure from CHF/pulmonary edema requiring intubation, failed SAT this morning No major changes this am. Had moderate agitation with SAT this am. Not having much resp secretions. Not able to obtain further ROS> The patient is critically ill on: Mechanical ventilation Review of Systems Unable to perform ROS: Intubated Objective: 
Vital Signs:   
Visit Vitals  /74  Pulse 65  Temp 99.2 °F (37.3 °C)  Resp 16  
 Ht 5' 9\" (1.753 m)  Wt 104.4 kg (230 lb 2.6 oz)  SpO2 99%  Breastfeeding No  
 BMI 33.99 kg/m2 O2 Device: Ventilator O2 Flow Rate (L/min): 15 l/min Temp (24hrs), Av.3 °F (37.4 °C), Min:98.3 °F (36.8 °C), Max:101 °F (38.3 °C) Intake/Output:  
Last shift:        
Last 3 shifts: 10/06 1901 - 10/08 0700 In: 4563.4 [I.V.:4193.4] Out: 4175 [TBKRE:1144] Intake/Output Summary (Last 24 hours) at 10/08/18 0901 Last data filed at 10/08/18 0700 Gross per 24 hour Intake          3542.93 ml Output             2045 ml Net          1497.93 ml Hemodynamics:  
PAP:   CO:    
Wedge:   CI:    
CVP:    SVR:    
  PVR:    
 
Ventilator Settings: 
Mode Rate Tidal Volume Pressure FiO2 PEEP Assist control   450 ml    40 % 6 cm H20 Peak airway pressure: 18 cm H2O Minute ventilation: 8.81 l/min Physical Exam  
Constitutional: She appears ill. She is sedated and intubated. HENT:  
Head: Normocephalic and atraumatic. Mouth/Throat: No oropharyngeal exudate. Eyes: No scleral icterus. Cardiovascular: An irregularly irregular rhythm present. Tachycardia present. No murmur heard. Pulmonary/Chest: She is intubated. She has no wheezes. She has rales. Abdominal: Soft. Bowel sounds are normal. She exhibits no distension. There is no tenderness. Musculoskeletal: She exhibits edema. Skin: Skin is warm and dry. No rash noted. Data:  
 
Current Facility-Administered Medications Medication Dose Route Frequency  PHENYLephrine (EZRA-SYNEPHRINE) 30 mg in 0.9% sodium chloride 250 mL infusion   mcg/min IntraVENous TITRATE  magnesium oxide (MAG-OX) tablet 400 mg  400 mg Oral DAILY  famotidine (PF) (PEPCID) 20 mg in sodium chloride 0.9% 10 mL injection  20 mg IntraVENous Q24H  
 venlafaxine (EFFEXOR) tablet 150 mg  150 mg Oral DAILY  pregabalin (LYRICA) capsule 200 mg  200 mg Oral BID  aspirin chewable tablet 81 mg  81 mg Oral DAILY  atorvastatin (LIPITOR) tablet 40 mg  40 mg Oral QHS  sodium chloride (NS) flush 5-10 mL  5-10 mL IntraVENous Q8H  propofol (DIPRIVAN) infusion  0-50 mcg/kg/min IntraVENous TITRATE  amiodarone (CORDARONE) 375 mg/250 mL D5W infusion  0.5 mg/min IntraVENous TITRATE  enoxaparin (LOVENOX) partial dose injection 105 mg  105 mg SubCUTAneous Q12H  bumetanide (BUMEX) injection 2 mg  2 mg IntraVENous Q12H  mupirocin (BACTROBAN) 2 % ointment   Both Nostrils BID  chlorhexidine (PERIDEX) 0.12 % mouthwash 15 mL  15 mL Oral BID Labs: 
Recent Labs 10/07/18 
 0420  10/06/18 
 1026 WBC  10.4  9.5 HGB  11.9  12.9 HCT  36.9  39.7 PLT  187  204 Recent Labs 10/07/18 
 1759  10/07/18 
 0420  10/06/18 
 1026 NA  142  146*  145  
K  2.9*  2.7*  2.8*  
CL  106  108  108 CO2  28  29  28 GLU  155*  131*  140* BUN  19  21*  22* CREA  1.54*  1.38*  1.35* CA  7.8*  7.9*  8.4* MG   --    --   1.9 ALB   --    --   3.3* TBILI   --    --   1.0 SGOT   --    --   17 ALT   --    --   20 Recent Labs 10/08/18 
 8244  10/07/18 
 0357  10/06/18 
 1700 PH  7.49*  7.56*  7.35  
PCO2  36  29*  44 PO2  86  74*  84 HCO3  27*  26  24 FIO2  40  50  100 Imaging: 
I have personally reviewed the patients radiographs and have reviewed the reports: 
Pulmonary edema, decreased since intubation 10-8-18: FINDINGS:  
A portable AP radiograph of the chest was obtained at 0434 hours. There is a 
pacemaker in the left chest with leads overlying the right atrium and ventricle. Lines and tubes: The patient is on a cardiac monitor. The endotracheal tube, 
nasogastric tube and right jugular triple-lumen catheter are unchanged. Lungs: The lungs are clear. Pleura: There are pleural effusions which are unchanged. Mediastinum: The cardiac and mediastinal contours and pulmonary vascularity are 
normal. 
Bones and soft tissues: The bones and soft tissues are grossly within normal 
limits. 
  
 
IMPRESSION: No significant change. Total critical care time exclusive of procedures: 35 minutes Jocy Dumont MD

## 2018-10-08 NOTE — PROGRESS NOTES
Care Management: 
 
Reason for Admission:   Resp Failure. Has hx of CHF, ef 15 %, Afib and cardiomyopathy. RRAT Score:    24 Resources/supports as identified by patient/family:   Family at bedside. DTR and sister are supportive along with . Top Challenges facing patient (as identified by patient/family and CM): Finances/Medication cost?      Not an issues at this time. She has her Medicare and her supplement. She uses [employer] in for [employer] and she also uses [facility] on [address]. Transportation?  Support system or lack thereof? Family Living arrangements? Lives with her  in a single story home. Self-care/ADL's/Cognition? At baseline she is alert and oriented and independent. Current Advanced Directive/Advance Care Plan:  Full Code Plan for utilizing home health:    [facility] on chart if [place] is ordered. Likelihood of readmission: Moderate Transition of Care Plan:       Home with family and possible HH and follow up with her doctors. Care Management Interventions PCP Verified by CM: Yes Transition of Care Consult (CM Consult): Other (Anticipate HH needs and if HH is ordered they would like [facility]. ) [address]: Yes Current Support Network: Lives with Spouse Confirm Follow Up Transport: Family () Plan discussed with Pt/Family/Caregiver: Yes (Met with  at bedside) Freedom of Choice Offered: Yes  
 
Josephine Whitaker crm acm [id number]

## 2018-10-08 NOTE — PROGRESS NOTES
Pharmacy Medication Reconciliation  
  
Patient now extubated and alert and oriented today.  was present.   
  
The patient was interviewed regarding current PTA medication list, use and drug allergies;  patient's  present in room and obtained permission from patient to discuss drug regimen with visitor(s) present. The patient was questioned regarding use of any other inhalers, topical products, over the counter medications, herbal medications, vitamin products or ophthalmic/nasal/otic medication use.   
  
Allergy Update: no updates 
  
Recommendations/Findings: The following amendments were made to the patient's PTA medication list on file at [facility]:  
  
1) Additions: none   
2) Deletions: none   
3) Changes: - Venlafaxine adjusted to Venlafaxine XR 150mg twice daily with food - Dabigatran confirmed as home med - instructions on PTA list adjusted to 150mg PO twice daily.  
- Premarin Vaginal cream instructions updated to twice weekly Tuesdays and Fridays 
- OTC Calcium and magnesium supplement clarified as \"combo\" pill - adjusted on PTA list.   
-OTC potassium gluconate clarified and adjusted to \"99mg\" tablet daily. 
-Pregablin dose confirmed with  - adjusted to 200mg PO twice daily 
  
-[facility] [phone] was called to clarify patient's home medications on Monday 10/8. OTC and other RX medications not filled by mail order pharmacy clarified with patient and her  at bedside on 10/10/18.  
  
PTA medication list was corrected to the following:  
  
Prior to Admission Medications Prescriptions Last Dose Informant Patient Reported? Taking? Potassium Gluconate 595 mg (99 mg) tablet   Self Yes Yes Sig: Take  by mouth daily. amLODIPine (NORVASC) 5 mg tablet 10/5/2018 at Unknown time   No Yes Sig: TAKE 1 TABLET EVERY DAY  
aspirin 81 mg chewable tablet 10/5/2018 at Unknown time Self Yes Yes Sig: Take 81 mg by mouth daily. atorvastatin (LIPITOR) 40 mg tablet 10/5/2018 at Unknown time Self No Yes Sig: Take 1 Tab by mouth nightly. bumetanide (BUMEX) 1 mg tablet 10/5/2018 at Unknown time   No Yes Sig: TAKE 1 TABLET IN THE MORNING ON TUESDAY & THURSDAY AND 2 TABLETS IN THE MORNING ON MONDAY, WEDNESDAY AND FRIDAY  
calcium carb,gluc/mag ox,gluc (CALCIUM MAGNESIUM PO)     Yes Yes Sig: Take 1 Tab by mouth daily. Calcium/Magnesium combo pill per patient  
cholecalciferol, vitamin d3, (VITAMIN D3) 400 unit cap 10/5/2018 at Unknown time   Yes Yes Sig: Take 400 Units by mouth daily. clonazePAM (KLONOPIN) 0.5 mg tablet 10/5/2018 at Unknown time   No Yes Sig: TAKE 1 TABLET EVERY NIGHT. MAX DAILY DOSE OF 0.5MG. conjugated estrogens (PREMARIN) 0.625 mg/gram vaginal cream     Yes Yes Sig: Insert  into vagina every Tuesday and Friday. Tuesdays and Fridays  
dabigatran etexilate (PRADAXA) 150 mg capsule 10/5/2018 at Unknown time   Yes Yes Sig: Take 150 mg by mouth two (2) times a day. lisinopril (PRINIVIL, ZESTRIL) 20 mg tablet 10/5/2018 at Unknown time Self No Yes Sig: Take 1 Tab by mouth daily. metoprolol succinate (TOPROL-XL) 100 mg tablet 10/5/2018 at Unknown time   Yes Yes Sig: Take 100 mg by mouth daily. pregabalin (LYRICA) 200 mg capsule 10/5/2018 at Unknown time   Yes Yes Sig: Take 200 mg by mouth two (2) times a day. venlafaxine-SR (EFFEXOR XR) 150 mg capsule     Yes Yes Sig: Take 150 mg by mouth two (2) times daily (with meals).  
   
Facility-Administered Medications: None  
  
   
  
Thank you, 
Darrell El, PHARMD

## 2018-10-08 NOTE — CARDIO/PULMONARY
Cardiopulmonary Rehab: 
 
Chart reviewed. Pt on the CHF bundle list. Pt is a 79 y.o. female admitted with Acute on chronic combined systolic/diastolic heart failure and acute resp failure. PMH includes stent, cardiomyopathy, LVERF 20%, HTN, A fib, SSS, CKD 3,  Non smoker. Pt currently intubated, and on tube feeds. Will follow for CHF teaching as appropriate.

## 2018-10-08 NOTE — PROGRESS NOTES
Problem: Pressure Injury - Risk of 
Goal: *Prevention of pressure injury Document Preet Scale and appropriate interventions in the flowsheet. Outcome: Progressing Towards Goal 
Pressure Injury Interventions: 
Sensory Interventions: Assess changes in LOC, Assess need for specialty bed, Avoid rigorous massage over bony prominences, Float heels, Keep linens dry and wrinkle-free, Maintain/enhance activity level, Minimize linen layers, Monitor skin under medical devices, Pad between skin to skin, Pressure redistribution bed/mattress (bed type), Turn and reposition approx. every two hours (pillows and wedges if needed), Check visual cues for pain Moisture Interventions: Absorbent underpads, Assess need for specialty bed, Check for incontinence Q2 hours and as needed, Internal/External urinary devices, Limit adult briefs, Maintain skin hydration (lotion/cream), Minimize layers, Moisture barrier Activity Interventions: Assess need for specialty bed, Pressure redistribution bed/mattress(bed type) Mobility Interventions: Assess need for specialty bed, Float heels, HOB 30 degrees or less, Pressure redistribution bed/mattress (bed type), Turn and reposition approx. every two hours(pillow and wedges) Nutrition Interventions: Document food/fluid/supplement intake, Discuss nutritional consult with provider Friction and Shear Interventions: Apply protective barrier, creams and emollients, Foam dressings/transparent film/skin sealants, HOB 30 degrees or less, Lift sheet, Lift team/patient mobility team, Minimize layers, Transferring/repositioning devices Problem: Falls - Risk of 
Goal: *Absence of Falls Document Jason Watkins Fall Risk and appropriate interventions in the flowsheet.   
Outcome: Progressing Towards Goal 
Fall Risk Interventions: 
  
 
Mentation Interventions: Adequate sleep, hydration, pain control, Bed/chair exit alarm, Door open when patient unattended, Evaluate medications/consider consulting pharmacy, More frequent rounding, Reorient patient, Room close to nurse's station, Update white board Medication Interventions: Bed/chair exit alarm, Evaluate medications/consider consulting pharmacy Elimination Interventions: Bed/chair exit alarm, Call light in reach, Toileting schedule/hourly rounds

## 2018-10-08 NOTE — PROGRESS NOTES
1915 Bedside and Verbal shift change report received from [facility], RN. Report included the following information SBAR, Kardex, ED Summary, Intake/Output, MAR and Recent Results. 1937 Patient is very agitated, biting ET tube, and gagging. Propofol increased to 20 mcg/min. 2000 Shift assessment completed. See doc flowsheets for details. 2100 Amiodarone decreased to 0.5 mg/min per order. 0715 Bedside and Verbal shift change report given to Clara RN (oncoming nurse) by Mac Blunt and Sierra Easton RN (offgoing nurse). Report included the following information SBAR, Kardex, ED Summary, Intake/Output and MAR.

## 2018-10-09 ENCOUNTER — APPOINTMENT (OUTPATIENT)
Dept: GENERAL RADIOLOGY | Age: 68
DRG: 226 | End: 2018-10-09
Attending: INTERNAL MEDICINE
Payer: MEDICARE

## 2018-10-09 LAB
ALBUMIN SERPL-MCNC: 2.6 G/DL (ref 3.5–5)
ALBUMIN SERPL-MCNC: 2.8 G/DL (ref 3.5–5)
ALBUMIN/GLOB SERPL: 0.8 {RATIO} (ref 1.1–2.2)
ALBUMIN/GLOB SERPL: 0.8 {RATIO} (ref 1.1–2.2)
ALP SERPL-CCNC: 105 U/L (ref 45–117)
ALP SERPL-CCNC: 92 U/L (ref 45–117)
ALT SERPL-CCNC: 13 U/L (ref 12–78)
ALT SERPL-CCNC: 15 U/L (ref 12–78)
ANION GAP SERPL CALC-SCNC: 10 MMOL/L (ref 5–15)
ANION GAP SERPL CALC-SCNC: 5 MMOL/L (ref 5–15)
ARTERIAL PATENCY WRIST A: YES
AST SERPL-CCNC: 13 U/L (ref 15–37)
AST SERPL-CCNC: 16 U/L (ref 15–37)
BASE EXCESS BLDA CALC-SCNC: 3.9 MMOL/L
BASOPHILS # BLD: 0 K/UL (ref 0–0.1)
BASOPHILS NFR BLD: 0 % (ref 0–1)
BDY SITE: ABNORMAL
BILIRUB SERPL-MCNC: 0.6 MG/DL (ref 0.2–1)
BILIRUB SERPL-MCNC: 0.7 MG/DL (ref 0.2–1)
BREATHS.SPONTANEOUS ON VENT: 16
BUN SERPL-MCNC: 24 MG/DL (ref 6–20)
BUN SERPL-MCNC: 25 MG/DL (ref 6–20)
BUN/CREAT SERPL: 16 (ref 12–20)
BUN/CREAT SERPL: 16 (ref 12–20)
CALCIUM SERPL-MCNC: 7.6 MG/DL (ref 8.5–10.1)
CALCIUM SERPL-MCNC: 8.2 MG/DL (ref 8.5–10.1)
CHLORIDE SERPL-SCNC: 108 MMOL/L (ref 97–108)
CHLORIDE SERPL-SCNC: 108 MMOL/L (ref 97–108)
CO2 SERPL-SCNC: 26 MMOL/L (ref 21–32)
CO2 SERPL-SCNC: 31 MMOL/L (ref 21–32)
CREAT SERPL-MCNC: 1.47 MG/DL (ref 0.55–1.02)
CREAT SERPL-MCNC: 1.53 MG/DL (ref 0.55–1.02)
DIFFERENTIAL METHOD BLD: ABNORMAL
EOSINOPHIL # BLD: 0.3 K/UL (ref 0–0.4)
EOSINOPHIL NFR BLD: 3 % (ref 0–7)
EPAP/CPAP/PEEP, PAPEEP: 6
ERYTHROCYTE [DISTWIDTH] IN BLOOD BY AUTOMATED COUNT: 19.4 % (ref 11.5–14.5)
FIO2 ON VENT: 40 %
GLOBULIN SER CALC-MCNC: 3.2 G/DL (ref 2–4)
GLOBULIN SER CALC-MCNC: 3.7 G/DL (ref 2–4)
GLUCOSE SERPL-MCNC: 132 MG/DL (ref 65–100)
GLUCOSE SERPL-MCNC: 176 MG/DL (ref 65–100)
HCO3 BLDA-SCNC: 27 MMOL/L (ref 22–26)
HCT VFR BLD AUTO: 32.9 % (ref 35–47)
HGB BLD-MCNC: 10.4 G/DL (ref 11.5–16)
IMM GRANULOCYTES # BLD: 0.1 K/UL (ref 0–0.04)
IMM GRANULOCYTES NFR BLD AUTO: 1 % (ref 0–0.5)
LYMPHOCYTES # BLD: 1.6 K/UL (ref 0.8–3.5)
LYMPHOCYTES NFR BLD: 17 % (ref 12–49)
MAGNESIUM SERPL-MCNC: 2.2 MG/DL (ref 1.6–2.4)
MAGNESIUM SERPL-MCNC: 2.2 MG/DL (ref 1.6–2.4)
MCH RBC QN AUTO: 29.3 PG (ref 26–34)
MCHC RBC AUTO-ENTMCNC: 31.6 G/DL (ref 30–36.5)
MCV RBC AUTO: 92.7 FL (ref 80–99)
MONOCYTES # BLD: 0.6 K/UL (ref 0–1)
MONOCYTES NFR BLD: 7 % (ref 5–13)
NEUTS SEG # BLD: 6.6 K/UL (ref 1.8–8)
NEUTS SEG NFR BLD: 72 % (ref 32–75)
NRBC # BLD: 0 K/UL (ref 0–0.01)
NRBC BLD-RTO: 0 PER 100 WBC
PCO2 BLDA: 37 MMHG (ref 35–45)
PH BLDA: 7.49 [PH] (ref 7.35–7.45)
PHOSPHATE SERPL-MCNC: 3.3 MG/DL (ref 2.6–4.7)
PLATELET # BLD AUTO: 165 K/UL (ref 150–400)
PMV BLD AUTO: 12.1 FL (ref 8.9–12.9)
PO2 BLDA: 70 MMHG (ref 80–100)
POTASSIUM SERPL-SCNC: 2.9 MMOL/L (ref 3.5–5.1)
POTASSIUM SERPL-SCNC: 3.4 MMOL/L (ref 3.5–5.1)
PRESSURE SUPPORT SETTING VENT: 8 CM[H2O]
PROT SERPL-MCNC: 5.8 G/DL (ref 6.4–8.2)
PROT SERPL-MCNC: 6.5 G/DL (ref 6.4–8.2)
RBC # BLD AUTO: 3.55 M/UL (ref 3.8–5.2)
SAO2 % BLD: 95 % (ref 92–97)
SAO2% DEVICE SAO2% SENSOR NAME: ABNORMAL
SODIUM SERPL-SCNC: 144 MMOL/L (ref 136–145)
SODIUM SERPL-SCNC: 144 MMOL/L (ref 136–145)
SPECIMEN SITE: ABNORMAL
VENTILATION MODE VENT: ABNORMAL
WBC # BLD AUTO: 9.1 K/UL (ref 3.6–11)

## 2018-10-09 PROCEDURE — 36415 COLL VENOUS BLD VENIPUNCTURE: CPT | Performed by: INTERNAL MEDICINE

## 2018-10-09 PROCEDURE — 71045 X-RAY EXAM CHEST 1 VIEW: CPT

## 2018-10-09 PROCEDURE — 74011250636 HC RX REV CODE- 250/636: Performed by: INTERNAL MEDICINE

## 2018-10-09 PROCEDURE — 80053 COMPREHEN METABOLIC PANEL: CPT | Performed by: INTERNAL MEDICINE

## 2018-10-09 PROCEDURE — 84100 ASSAY OF PHOSPHORUS: CPT | Performed by: INTERNAL MEDICINE

## 2018-10-09 PROCEDURE — 74011000250 HC RX REV CODE- 250: Performed by: INTERNAL MEDICINE

## 2018-10-09 PROCEDURE — 74011000250 HC RX REV CODE- 250

## 2018-10-09 PROCEDURE — 36600 WITHDRAWAL OF ARTERIAL BLOOD: CPT | Performed by: INTERNAL MEDICINE

## 2018-10-09 PROCEDURE — 74011250637 HC RX REV CODE- 250/637: Performed by: INTERNAL MEDICINE

## 2018-10-09 PROCEDURE — 77030037870 HC GLD SHT PREVALON SAGE -B

## 2018-10-09 PROCEDURE — 74011000258 HC RX REV CODE- 258: Performed by: INTERNAL MEDICINE

## 2018-10-09 PROCEDURE — 83735 ASSAY OF MAGNESIUM: CPT | Performed by: INTERNAL MEDICINE

## 2018-10-09 PROCEDURE — 82803 BLOOD GASES ANY COMBINATION: CPT | Performed by: INTERNAL MEDICINE

## 2018-10-09 PROCEDURE — 94003 VENT MGMT INPAT SUBQ DAY: CPT

## 2018-10-09 PROCEDURE — 85025 COMPLETE CBC W/AUTO DIFF WBC: CPT | Performed by: INTERNAL MEDICINE

## 2018-10-09 PROCEDURE — 65660000000 HC RM CCU STEPDOWN

## 2018-10-09 PROCEDURE — 94660 CPAP INITIATION&MGMT: CPT

## 2018-10-09 RX ORDER — DEXAMETHASONE SODIUM PHOSPHATE 4 MG/ML
6 INJECTION, SOLUTION INTRA-ARTICULAR; INTRALESIONAL; INTRAMUSCULAR; INTRAVENOUS; SOFT TISSUE EVERY 8 HOURS
Status: DISCONTINUED | OUTPATIENT
Start: 2018-10-09 | End: 2018-10-10

## 2018-10-09 RX ORDER — METOPROLOL TARTRATE 5 MG/5ML
5 INJECTION INTRAVENOUS
Status: DISCONTINUED | OUTPATIENT
Start: 2018-10-09 | End: 2018-10-11

## 2018-10-09 RX ORDER — DEXAMETHASONE SODIUM PHOSPHATE 4 MG/ML
INJECTION, SOLUTION INTRA-ARTICULAR; INTRALESIONAL; INTRAMUSCULAR; INTRAVENOUS; SOFT TISSUE
Status: DISPENSED
Start: 2018-10-09 | End: 2018-10-09

## 2018-10-09 RX ORDER — POTASSIUM CHLORIDE 29.8 MG/ML
20 INJECTION INTRAVENOUS
Status: COMPLETED | OUTPATIENT
Start: 2018-10-09 | End: 2018-10-12

## 2018-10-09 RX ORDER — DEXAMETHASONE SODIUM PHOSPHATE 4 MG/ML
6 INJECTION, SOLUTION INTRA-ARTICULAR; INTRALESIONAL; INTRAMUSCULAR; INTRAVENOUS; SOFT TISSUE EVERY 8 HOURS
Status: DISCONTINUED | OUTPATIENT
Start: 2018-10-09 | End: 2018-10-09

## 2018-10-09 RX ORDER — METOPROLOL TARTRATE 5 MG/5ML
2.5 INJECTION INTRAVENOUS ONCE
Status: COMPLETED | OUTPATIENT
Start: 2018-10-09 | End: 2018-10-09

## 2018-10-09 RX ADMIN — CHLORHEXIDINE GLUCONATE 15 ML: 1.2 RINSE ORAL at 08:23

## 2018-10-09 RX ADMIN — VENLAFAXINE 150 MG: 37.5 TABLET ORAL at 08:23

## 2018-10-09 RX ADMIN — ENOXAPARIN SODIUM 100 MG: 100 INJECTION, SOLUTION INTRAVENOUS; SUBCUTANEOUS at 05:00

## 2018-10-09 RX ADMIN — BUMETANIDE 2 MG: 0.25 INJECTION INTRAMUSCULAR; INTRAVENOUS at 11:24

## 2018-10-09 RX ADMIN — AMIODARONE HYDROCHLORIDE 0.5 MG/MIN: 50 INJECTION, SOLUTION INTRAVENOUS at 02:53

## 2018-10-09 RX ADMIN — Medication 10 ML: at 05:01

## 2018-10-09 RX ADMIN — ATORVASTATIN CALCIUM 40 MG: 40 TABLET, FILM COATED ORAL at 21:10

## 2018-10-09 RX ADMIN — Medication 10 ML: at 13:50

## 2018-10-09 RX ADMIN — POTASSIUM CHLORIDE 20 MEQ: 400 INJECTION, SOLUTION INTRAVENOUS at 05:10

## 2018-10-09 RX ADMIN — METOPROLOL TARTRATE 2.5 MG: 1 INJECTION, SOLUTION INTRAVENOUS at 12:29

## 2018-10-09 RX ADMIN — AMIODARONE HYDROCHLORIDE 150 MG: 50 INJECTION, SOLUTION INTRAVENOUS at 02:06

## 2018-10-09 RX ADMIN — ENOXAPARIN SODIUM 100 MG: 100 INJECTION, SOLUTION INTRAVENOUS; SUBCUTANEOUS at 18:34

## 2018-10-09 RX ADMIN — MUPIROCIN: 20 OINTMENT TOPICAL at 18:35

## 2018-10-09 RX ADMIN — CHLORHEXIDINE GLUCONATE 15 ML: 1.2 RINSE ORAL at 18:35

## 2018-10-09 RX ADMIN — ASPIRIN 81 MG CHEWABLE TABLET 81 MG: 81 TABLET CHEWABLE at 08:23

## 2018-10-09 RX ADMIN — PREGABALIN 200 MG: 100 CAPSULE ORAL at 08:23

## 2018-10-09 RX ADMIN — RACEPINEPHRINE HYDROCHLORIDE 0.5 ML: 11.25 SOLUTION RESPIRATORY (INHALATION) at 11:24

## 2018-10-09 RX ADMIN — DEXAMETHASONE SODIUM PHOSPHATE 6 MG: 4 INJECTION, SOLUTION INTRAMUSCULAR; INTRAVENOUS at 11:28

## 2018-10-09 RX ADMIN — DEXAMETHASONE SODIUM PHOSPHATE 6 MG: 4 INJECTION, SOLUTION INTRAMUSCULAR; INTRAVENOUS at 21:12

## 2018-10-09 RX ADMIN — MUPIROCIN: 20 OINTMENT TOPICAL at 08:24

## 2018-10-09 RX ADMIN — PREGABALIN 200 MG: 100 CAPSULE ORAL at 18:34

## 2018-10-09 RX ADMIN — Medication 400 MG: at 08:23

## 2018-10-09 RX ADMIN — Medication 10 ML: at 21:13

## 2018-10-09 RX ADMIN — FAMOTIDINE 20 MG: 10 INJECTION, SOLUTION INTRAVENOUS at 10:50

## 2018-10-09 RX ADMIN — BUMETANIDE 2 MG: 0.25 INJECTION INTRAMUSCULAR; INTRAVENOUS at 00:18

## 2018-10-09 RX ADMIN — POTASSIUM CHLORIDE 20 MEQ: 400 INJECTION, SOLUTION INTRAVENOUS at 07:10

## 2018-10-09 RX ADMIN — PROPOFOL 30 MCG/KG/MIN: 10 INJECTION, EMULSION INTRAVENOUS at 04:02

## 2018-10-09 RX ADMIN — DEXMEDETOMIDINE HYDROCHLORIDE 0.2 MCG/KG/HR: 100 INJECTION, SOLUTION INTRAVENOUS at 12:29

## 2018-10-09 NOTE — PROGRESS NOTES
[facility], Critical Care, and Sleep Medicine Name: Marco Antonio Mcqueen MRN: [id number] : 1950 Hospital: [place] [address] Date: 10/9/2018 IMPRESSION:  
· Acute hypoxic respiratory failure, Pt passed her SAT, SBT this am. Pt was then extubated. · Acute laryngeal edema, stridor post extubation. · Acute pulmonary edema · Acute/chronic systolic/diastolic heart failure - LVEF 15% · Ischemic cardiomyopathy · SSS with pacemaker · Chronic afib, now in RVR · DM 
· CKD · SHAYLA · Critically ill, moderate to high risk of decompensation. ON Vent support. On propofol and amiodarone infusions. 35 min CC, EOP. PLAN:  
· Pt was extubated and developed stridor. If worsens may need re intubation. · Added steroids with decadron. · Placed on bipap. · NPO for now. · Sedation for increased work of breathing will, placed on precedex drip. · Diuretics · Amiodarone drip · Ideally would add back her home metoprolol, but difficult with her decompensation in heart failure a 
· Insulin/glycemic monitoring · Monitor creatinine · Pain control/sedation as needed · DVT/GI prophylaxis Subjective/Interval History:  
I have reviewed the flowsheet and previous days notes. The patient is unable to give any meaningful history or review of systems because the patient is: Pt was assess earlier this am. Was able to pass SAT, SBT, then upon extubation developed stridor. No acute issues noted last pm. Has been with increased Heart rate and increased BP. Not able to obtain ROS or HPI from pt. The patient is critically ill on: Mechanical ventilation Review of Systems Unable to perform ROS: Intubated Objective: 
Vital Signs:   
Visit Vitals  BP 96/61  Pulse (!) 104  Temp 99.3 °F (37.4 °C)  Resp 15  Ht 5' 9\" (1.753 m)  Wt 104.5 kg (230 lb 6.1 oz)  SpO2 97%  Breastfeeding No  
 BMI 34.02 kg/m2 O2 Device: Ventilator O2 Flow Rate (L/min): 15 l/min Temp (24hrs), Av.5 °F (36.9 °C), Min:96.6 °F (35.9 °C), Max:99.3 °F (37.4 °C) Intake/Output:  
Last shift:      10/08 1901 - 10/09 0700 In: 541.7 [I.V.:251.7] Out: 555 [Urine:555] Last 3 shifts: 10/07 0701 - 10/08 1900 In: 8711 [I.V.:3970] Out: 3133 [QYWAE:6084] Intake/Output Summary (Last 24 hours) at 10/09/18 2809 Last data filed at 10/09/18 0507 Gross per 24 hour Intake          1118.44 ml Output             1505 ml Net          -386.56 ml Hemodynamics:  
PAP:   CO:    
Wedge:   CI:    
CVP:    SVR:    
  PVR:    
 
Ventilator Settings: 
Mode Rate Tidal Volume Pressure FiO2 PEEP Assist control   450 ml    40 % 6 cm H20 Peak airway pressure: 25 cm H2O Minute ventilation: 6.16 l/min Physical Exam  
Constitutional: She appears ill. She is sedated and intubated. Prior to extubation pt appeared comfortable. Upon extubation quickly developed stridor. She was placed on bipap and appeared to be comfortable. HENT:  
Head: Normocephalic and atraumatic. Mouth/Throat: No oropharyngeal exudate. Eyes: No scleral icterus. Cardiovascular: An irregularly irregular rhythm present. Tachycardia present. No murmur heard. Pulmonary/Chest: She is intubated. She has no wheezes. She has rales. Abdominal: Soft. Bowel sounds are normal. She exhibits no distension. There is no tenderness. Musculoskeletal: She exhibits edema. Skin: Skin is warm and dry. No rash noted. Data:  
 
Current Facility-Administered Medications Medication Dose Route Frequency  potassium chloride 20 mEq in 50 ml IVPB  20 mEq IntraVENous Q1H  
 PHENYLephrine (EZRA-SYNEPHRINE) 30 mg in 0.9% sodium chloride 250 mL infusion   mcg/min IntraVENous TITRATE  enoxaparin (LOVENOX) injection 100 mg  100 mg SubCUTAneous Q12H  
 magnesium oxide (MAG-OX) tablet 400 mg  400 mg Oral DAILY  famotidine (PF) (PEPCID) 20 mg in sodium chloride 0.9% 10 mL injection  20 mg IntraVENous Q24H  
 venlafaxine (EFFEXOR) tablet 150 mg  150 mg Oral DAILY  pregabalin (LYRICA) capsule 200 mg  200 mg Oral BID  aspirin chewable tablet 81 mg  81 mg Oral DAILY  atorvastatin (LIPITOR) tablet 40 mg  40 mg Oral QHS  sodium chloride (NS) flush 5-10 mL  5-10 mL IntraVENous Q8H  propofol (DIPRIVAN) infusion  0-50 mcg/kg/min IntraVENous TITRATE  amiodarone (CORDARONE) 375 mg/250 mL D5W infusion  0.5 mg/min IntraVENous TITRATE  bumetanide (BUMEX) injection 2 mg  2 mg IntraVENous Q12H  mupirocin (BACTROBAN) 2 % ointment   Both Nostrils BID  chlorhexidine (PERIDEX) 0.12 % mouthwash 15 mL  15 mL Oral BID Labs: 
Recent Labs 10/09/18 
 8486  10/08/18 
 5407  10/07/18 
 9990 WBC  9.1  12.3*  10.4 HGB  10.4*  11.5  11.9 HCT  32.9*  36.9  36.9 PLT  165  175  187 Recent Labs 10/09/18 
 0249  10/08/18 
 0828  10/07/18 
 1759   10/06/18 
 1026 NA  144  144  142   < >  145  
K  2.9*  3.5  2.9*   < >  2.8*  
CL  108  108  106   < >  108 CO2  31  28  28   < >  28 GLU  132*  126*  155*   < >  140* BUN  25*  19  19   < >  22* CREA  1.53*  1.57*  1.54*   < >  1.35* CA  7.6*  7.8*  7.8*   < >  8.4* MG  2.2  2.3   --    --   1.9 PHOS  3.3  2.7   --    --    --   
ALB  2.6*  2.8*   --    --   3.3* TBILI  0.6  1.0   --    --   1.0 SGOT  16  20   --    --   17 ALT  13  15   --    --   20  
 < > = values in this interval not displayed. Recent Labs 10/08/18 
 9113  10/07/18 
 0357  10/06/18 
 1700 PH  7.49*  7.56*  7.35  
PCO2  36  29*  44 PO2  86  74*  84 HCO3  27*  26  24 FIO2  40  50  100 Imaging: 
I have personally reviewed the patients radiographs and have reviewed the reports: 
Pulmonary edema, decreased since intubation 10-8-18: FINDINGS:  
A portable AP radiograph of the chest was obtained at 0434 hours.  There is a 
 pacemaker in the left chest with leads overlying the right atrium and ventricle. Lines and tubes: The patient is on a cardiac monitor. The endotracheal tube, 
nasogastric tube and right jugular triple-lumen catheter are unchanged. Lungs: The lungs are clear. Pleura: There are pleural effusions which are unchanged. Mediastinum: The cardiac and mediastinal contours and pulmonary vascularity are 
normal. 
Bones and soft tissues: The bones and soft tissues are grossly within normal 
limits. 
  
 
IMPRESSION: No significant change.  
 
 
Jocy Dumont MD

## 2018-10-09 NOTE — CONSULTS
EP/ ARRHYTHMIA CONSULT requested by Dr. Lois Welsh secondary to atrial fibrillation    Patient ID:  Patient: Neri Solis  MRN: [id number]  Age: 79 y.o.  : 1950    Date of  Admission: 10/6/2018 10:07 AM   PCP:  Maru Hunt MD   Usual cardiologist:  Torin Akins MD    Assessment: 1. Recurrent symptomatic atrial fibrillation with RVR. Had been off amiodarone for some time, restarted here due to uncontrolled arrhythmia. She is rate-controlled currently. S/p cryoballoon PVI in , redo RFA PVI with substrate work in .  2. Sick sinus syndrome s/p dual chamber SJM pacemaker in . 3. Chronic anticoagulation. 4. Chronic ischemic and nonischemic (mixed) cardiomyopathy. She had an improvement in ejection fraction from 20% to 45% in the past, BUT now down to 15-20% by echo this admission. 5. Acute on chronic systolic congestive heart failure, baseline New [place] Heart Association CHF class 3 lately prior to decompensation. She is on a beta blocker and ACE inhibitor as an OP. 6. Faintly positive troponin due to heart failure. 7. Hypertensive heart disease with heart failure and CKD stage 3.  8. Diabetes mellitus type 2, with diabetic neuropathy. Not requiring chronic insulin. 9. Obstructive sleep apnea. 10. Hypokalemia.     Plan:     1. Continue amiodarone load, ultimately will get better Afib suppression as the drug accumulates. She'll need to stay on amiodarone. 2. Eventually continue beta-blocker, start at low dose as BP allows as early as tomorrow. 3. Agree with K repletion. 4. Eventually continue ACEI, but that can come later. 5. No pacemaker program changes at this time. Her declining EF may or may not be due to poor rate control, will check the office data to see what her chronic rate control has been like. Regardless of the result of this check, the plan is to rate control including via rhythm control.     She'll need optimization of her heart failure status in the shortterm, not in good condition to undergo what I'd recommend. I think she'll need a BIV-ICD upgrade and AV node ablation ultimately for definitive management of her cardiomyopathy, heart failure, and tachycardia problem. [x]       High complexity decision making was performed in this patient at high risk for decompensation with multiple organ involvement. All questions answered for her and her family. Aline Lucia is a 79 y.o. female with a history of hypertensive heart disease with heart failure, mixed ischemic and tachycardia-mediated cardiomyopathy diagnosed in 2014, prior percutaneous intervention to the left anterior descending artery, dual-chamber pacemaker implanted for sick sinus syndrome, here for decompensated heart failure. She has had a history of a prior atrial fibrillation   ablation in 4/2016 using a cryoballoon approach. After that ablation, she had some short episodes of arrhythmia which seemed to   subside. Her amiodarone was reduced and then she had an increased burden of arrhythmia. She wanted to avoid chronic amiodarone, so a comprehensive ablation for recurrent atrial fibrillation was done in 1/2017. She did well for some time, then had recurrence of atrial fibrillation, still off amiodarone. She was doing well in persistent Afib, so left to it. She was doing OK she says until about 2 days PTA. She developed worsening dyspnea on exertion, orthopnea, and lower extremity edema. No known precipitant.     Per the hospitalist H&P:  \"This is a 61-year-old female with past medical history of systolic congestive heart failure, atrial fibrillation is coming to the hospital with chief complaints of shortness of breath since last 2 days.  Patient reports being in her usual state of health until about 2 days ago when she noticed shortness of breath which is worse with exertion, with orthopnea but no PND. Jenn Deal reports mild cough but no phlegm.  Denies palpitations. Jenn Deal does report associated chest pressure along with shortness of breath.  Denies abdominal pain, diarrhea or constipation.  Denies fever or chills.  When EMS arrived she was saturating about 88% on arrival and her saturations did not improve on nonrebreather.  She reports she has not been taking her usual medication since this morning. On arrival to the hospital she was tachycardic and blood pressure was 139/107.  She was saturating at about 88% on room air.  Lab work revealed hemoglobin of 12.9 WBC of 9.5.  BMP shows a potassium of 2.8, creatinine of 1.35. BNP was 6600, troponin 0 0.05.  She had a chest x-ray which revealed no development of moderate congestive heart failure. \"      Past Medical History:   Diagnosis Date    Anxiety 1/22/2018    Arthritis     OA    Asthma     Diabetes ([facility] [address]     Essential hypertension     GERD (gastroesophageal reflux disease)     Hypercholesterolemia 1/22/2018    Hypertension     Long-term use of high-risk medication 1/22/2018    Neuropathy     Other ill-defined conditions(799.89)     IBS, spinal stenosis    Plantar fasciitis     Psychiatric disorder     depression, anxiety    Sleep apnea     uses CPAP    Type 2 diabetes mellitus with diabetic neuropathy, without long-term current use of insulin ([facility] [address] 6/5/2016        Past Surgical History:   Procedure Laterality Date    CARDIAC SURG PROCEDURE UNLIST      stent    COLONOSCOPY N/A 3/14/2018    COLONOSCOPY performed by Shayna Clark MD at [facility] ENDOSCOPY    HX APPENDECTOMY      HX HYSTERECTOMY      HX PACEMAKER         Social History   Substance Use Topics    Smoking status: Never Smoker    Smokeless tobacco: Never Used    Alcohol use No        Family History   Problem Relation Age of Onset    Arthritis-osteo Mother     Hypertension Mother     High Cholesterol Mother     Crohn's Disease Mother     Heart Disease Mother     Alcohol abuse Father     High Cholesterol Sister     Hypertension Sister     Thyroid Disease Sister     COPD Sister     High Cholesterol Brother     Hypertension Brother     COPD Brother     COPD Child     Inflammatory Bowel Dz Child         Allergies   Allergen Reactions    Sulfa (Sulfonamide Antibiotics) Swelling    Amoxicillin Swelling          Current Facility-Administered Medications   Medication Dose Route Frequency    metoprolol (LOPRESSOR) injection 5 mg  5 mg IntraVENous Q4H PRN    dexamethasone (DECADRON) 4 mg/mL injection        dexamethasone (DECADRON) 4 mg/mL injection 6 mg  6 mg IntraVENous Q8H    dexmedeTOMidine (PRECEDEX) 400 mcg in 0.9% sodium chloride 100 mL infusion  0.2-1.4 mcg/kg/hr IntraVENous TITRATE    PHENYLephrine (EZRA-SYNEPHRINE) 30 mg in 0.9% sodium chloride 250 mL infusion   mcg/min IntraVENous TITRATE    enoxaparin (LOVENOX) injection 100 mg  100 mg SubCUTAneous Q12H    magnesium oxide (MAG-OX) tablet 400 mg  400 mg Oral DAILY    famotidine (PF) (PEPCID) 20 mg in sodium chloride 0.9% 10 mL injection  20 mg IntraVENous Q24H    venlafaxine (EFFEXOR) tablet 150 mg  150 mg Oral DAILY    fentaNYL citrate (PF) injection  mcg   mcg IntraVENous Q1H PRN    pregabalin (LYRICA) capsule 200 mg  200 mg Oral BID    aspirin chewable tablet 81 mg  81 mg Oral DAILY    atorvastatin (LIPITOR) tablet 40 mg  40 mg Oral QHS    clonazePAM (KlonoPIN) tablet 0.5 mg  0.5 mg Oral DAILY PRN    sodium chloride (NS) flush 5-10 mL  5-10 mL IntraVENous Q8H    sodium chloride (NS) flush 5-10 mL  5-10 mL IntraVENous PRN    acetaminophen (TYLENOL) tablet 650 mg  650 mg Oral Q6H PRN    docusate sodium (COLACE) capsule 100 mg  100 mg Oral DAILY PRN    propofol (DIPRIVAN) infusion  0-50 mcg/kg/min IntraVENous TITRATE    amiodarone (CORDARONE) 375 mg/250 mL D5W infusion  0.5 mg/min IntraVENous TITRATE    prochlorperazine (COMPAZINE) with saline injection 5 mg  5 mg IntraVENous Q6H PRN    bumetanide (BUMEX) injection 2 mg  2 mg IntraVENous Q12H    mupirocin (BACTROBAN) 2 % ointment   Both Nostrils BID    chlorhexidine (PERIDEX) 0.12 % mouthwash 15 mL  15 mL Oral BID       Review of Symptoms:  See HPI as well.   General: negative for fever, chills, sweats, weakness, weight loss   Eyes: negative for blurred vision, eye pain, loss of vision, diplopia   Ear Nose and Throat: negative for rhinorrhea, pharyngitis, otalgia, tinnitus, speech or swallowing difficulties   Respiratory: negative for sputum production, wheezing, pleuritic pain   Cardiology: negative for chest pain, palpitations, PND, syncope   Gastrointestinal: negative for abdominal pain, N/V, dysphagia, change in bowel habits, bleeding   Genitourinary: negative for frequency, urgency, dysuria, hematuria, incontinence   Muskuloskeletal : negative for arthralgia, myalgia   Hematology: negative for easy bruising, bleeding, lymphadenopathy   Dermatological: negative for rash, ulceration, mole change, new lesion   Endocrine: negative for hot flashes or polydipsia   Neurological: negative for headache, dizziness, confusion, focal weakness, paresthesia, memory loss, gait disturbance   Psychological: negative for anxiety, depression, agitation       Objective:      Physical Exam:  Temp (24hrs), Av.5 °F (36.9 °C), Min:96.6 °F (35.9 °C), Max:99.3 °F (37.4 °C)    Patient Vitals for the past 8 hrs:   Pulse   10/09/18 1500 (!) 111   10/09/18 1400 93   10/09/18 1300 (!) 122   10/09/18 1229 (!) 133   10/09/18 1137 (!) 160   10/09/18 1124 (!) 143   10/09/18 1100 (!) 118   10/09/18 1000 (!) 112   10/09/18 0900 (!) 110    Patient Vitals for the past 8 hrs:   Resp   10/09/18 1500 18   10/09/18 1400 13   10/09/18 1300 19   10/09/18 1137 26   10/09/18 1100 12   10/09/18 1000 17   10/09/18 0900 20    Patient Vitals for the past 8 hrs:   BP   10/09/18 1500 117/73   10/09/18 1400 141/81   10/09/18 1300 142/70   10/09/18 1229 153/90   10/09/18 1124 124/80   10/09/18 1100 124/80   10/09/18 1000 118/90   10/09/18 0900 135/40 Intake/Output Summary (Last 24 hours) at 10/09/18 1622  Last data filed at 10/09/18 1500   Gross per 24 hour   Intake          1363.15 ml   Output             1430 ml   Net           -66.85 ml       Nondiaphoretic, not in acute distress. Tired. Supple, no palpable thyromegaly. No scleral icterus, mucous membranes moist, conjuctivae pink, no xanthelasma. L chest pacemaker site OK. Unlabored, clear to auscultation bilaterally anteriorly, symmetric air movement. Regular rate and rhythm, no murmur, pericardial rub, knock, or gallop. No JVD but +peripheral edema. No carotid bruit. Palpable radial and DP/PT pulses bilaterally. Abdomen obese, soft, nontender, nondistended. No abdominal bruit or pulstatile masses. Extremities without cyanosis or clubbing. Muscle tone and bulk normal.  Skin warm and dry. No rashes or ulcers. Neuro grossly nonfocal.  No tremor. Awake and appropriate. CARDIOGRAPHICS and STUDIES, I reviewed:    Telemetry:  Afib with intermittent V pacing. Echo 10/8:  LEFT VENTRICLE: Size was at the upper limits of normal. Ejection fraction  was estimated in the range of 15 % to 20 %. There was diffuse hypokinesis. There was mild concentric hypertrophy. RIGHT VENTRICLE: The size was normal. Systolic function was normal.    LEFT ATRIUM: The atrium was mildly dilated. RIGHT ATRIUM: The atrium was mildly dilated. MITRAL VALVE: Normal valve structure. There was normal leaflet separation. DOPPLER: The transmitral velocity was within the normal range. There was  no evidence for stenosis. There was mild regurgitation. AORTIC VALVE: The valve was trileaflet. Leaflets exhibited normal cuspal  separation and sclerosis. DOPPLER: There was no stenosis. There was no  regurgitation. TRICUSPID VALVE: Normal valve structure. There was normal leaflet  separation. DOPPLER: The transtricuspid velocity was within the normal  range. There was no evidence for tricuspid stenosis.  There was trivial  regurgitation. PULMONIC VALVE: Not well visualized, but normal Doppler findings. AORTA: The root exhibited normal size. PERICARDIUM: There was no pericardial effusion. Labs:  Recent Labs      10/06/18   1731   TROIQ  0.05*     Lab Results   Component Value Date/Time    Cholesterol, total 104 08/01/2018 10:17 AM    HDL Cholesterol 33 (L) 08/01/2018 10:17 AM    LDL, calculated 49 08/01/2018 10:17 AM    Triglyceride 110 08/01/2018 10:17 AM    CHOL/HDL Ratio 4.7 04/03/2014 03:30 PM     No results for input(s): INR, PTP, APTT in the last 72 hours. No lab exists for component: INREXT   Recent Labs      10/09/18   1351  10/09/18   0249  10/08/18   0828   10/07/18   0420   NA  144  144  144   < >  146*   K  3.4*  2.9*  3.5   < >  2.7*   CL  108  108  108   < >  108   CO2  26  31  28   < >  29   BUN  24*  25*  19   < >  21*   CREA  1.47*  1.53*  1.57*   < >  1.38*   GLU  176*  132*  126*   < >  131*   PHOS   --   3.3  2.7   --    --    CA  8.2*  7.6*  7.8*   < >  7.9*   ALB  2.8*  2.6*  2.8*   --    --    WBC   --   9.1  12.3*   --   10.4   HGB   --   10.4*  11.5   --   11.9   HCT   --   32.9*  36.9   --   36.9   PLT   --   165  175   --   187    < > = values in this interval not displayed.      Recent Labs      10/09/18   1351  10/09/18   0249  10/08/18   0828   SGOT  13*  16  20   AP  105  92  104   TP  6.5  5.8*  6.3*   ALB  2.8*  2.6*  2.8*   GLOB  3.7  3.2  3.5     No components found for: [place]  Recent Labs      10/09/18   1030  10/08/18   0623   PH  7.49*  7.49*   PCO2  37  36   PO2  70*  86           Lulu Shetty MD  10/9/2018

## 2018-10-09 NOTE — PROGRESS NOTES
Consult received, will see tomorrow. Agree with rhythm maintenance with amio, I think good things will follow from controlling the rhythm and preventing tachycardia alone.

## 2018-10-09 NOTE — CARDIO/PULMONARY
Cardiopulmonary Rehab: 
  
Chart reviewed. Pt on the CHF bundle list. Pt is a 79 y.o. female admitted with Acute on chronic combined systolic/diastolic heart failure and acute resp failure.  
  
PMH includes stent, cardiomyopathy, LVERF 20%, HTN, A fib, SSS, CKD 3,  Non smoker.  
  
Pt extubated today. Pt visited in CCU, family at the bedside leaving the room, headed for lunch. Introduced self ( to family) and explained role of cardiac rehab nurse. Pt asleep on BiPAP. Will continue to follow for CHF teaching at more appropriate time.

## 2018-10-09 NOTE — PROGRESS NOTES
0700: Received bedside and verbal shift report from DIAN Camarena. 
 
0730: Assessment complete, see flowsheets for details. 1110: Respiratory at bedside, extubated patient to 5L NC; patient tolerated well. 1120: Respiratory distress and stridor noted, respiratory therapist and Dr. Fanny Kevin bedside; verbal order received to give racepinephrine 2.25% via nebulizer and dexamethasone 6mg, and place patient on Bi-PAP. 
 
1200: Assessment complete, see flowsheets for details; temp 99 axillary, RASS -1, family bedside comforting patient. 1229: Precedex started at 0.2 mcg/kg/hr. 1600: Assessment complete, see flowsheets for details; afebrile, vitals stable, no complaints of pain/discomofrt, on 2L NC, resting comfortably, family at bedside. 1837: Dysphagia Screening complete, patient tolerated well. 
 
1900: Gave bedside and verbal shift report to Romelia Downs RN.

## 2018-10-09 NOTE — PROGRESS NOTES
[address] 
[phone] Medical Progress Note NAME: Tabitha Kebede :  1950 MRM:  [id number] Date/Time: 10/9/2018  6:07 AM 
  
Subjective:  
 
Admitted with acute respiratory failure with hypoxemia due to systolic CHF associated with atrial fib with RVR. Bedside EF 15-20%. Chest xray showed moderate CHF. Respiratory failure rapidly progressed with subsequent intubation. Patient on pressors for short period of time. Currently on amiodarone drip and propofol. Converted back to afib with eleved HR requiring bolus of amiodarone last pm. Continuing to diurese. K+ remains low (2.9) - receiving IV replacement at present. TF's started. Past Medical History:  
Diagnosis Date  Anxiety 2018  Arthritis OA  Asthma  Diabetes ([facility]  Essential hypertension  GERD (gastroesophageal reflux disease)  Hypercholesterolemia 2018  Hypertension  Long-term use of high-risk medication 2018  Neuropathy  Other ill-defined conditions(799.89) IBS, spinal stenosis  Plantar fasciitis  Psychiatric disorder   
 depression, anxiety  Sleep apnea   
 uses CPAP  Type 2 diabetes mellitus with diabetic neuropathy, without long-term current use of insulin ([facility] 2016 ROS:  Patient was not able to provide review of systems due to mental status change/acute illness Objective:  
 
 
Vitals:  
 
  
Last 24hrs VS reviewed since prior progress note. Most recent are: 
 
Visit Vitals  BP 96/61  Pulse (!) 104  Temp 99.3 °F (37.4 °C)  Resp 15  Ht 5' 9\" (1.753 m)  Wt 230 lb 6.1 oz (104.5 kg)  SpO2 97%  Breastfeeding No  
 BMI 34.02 kg/m2 SpO2 Readings from Last 6 Encounters:  
10/09/18 97% 09/10/18 96% 18 94% 18 96% 18 96% 18 97% O2 Flow Rate (L/min): 15 l/min Intake/Output Summary (Last 24 hours) at 10/09/18 0390 Last data filed at 10/09/18 0507 Gross per 24 hour Intake          1118.44 ml Output             1505 ml Net          -386.56 ml Telemetry reviewed:   afib Tubes:   Vargas, Intubated/Vent and OG tube and central line X-Ray:  Admission chest xray - moderated CHF Procedures:   Echo - Left ventricle: Ejection fraction was estimated in the range of 15 % to 20 
%. There was diffuse hypokinesis. Mitral valve: There was mild regurgitation. Exam:  
 
General   well developed, well nourished, appears stated age, on vent, agitated Neck   Supple without nodes or mass. No thyromegaly or bruit Respiratory   Anterior rhonchi present Cardiology  RRR without murmur Abdominal  Soft, non-tender, non-distended, positive bowel sounds, no hepatosplenomegaly Extremities  Without LE edema Skin  Normal skin turgor. No rashes or skin ulcers noted Neurological On propofol Psychological  On propofol Exam otherwise negative Lab Data Reviewed: (see below) Medications Reviewed: (see below) 
 
______________________________________________________________________ Medications:  
 
Current Facility-Administered Medications Medication Dose Route Frequency  potassium chloride 20 mEq in 50 ml IVPB  20 mEq IntraVENous Q1H  
 PHENYLephrine (EZRA-SYNEPHRINE) 30 mg in 0.9% sodium chloride 250 mL infusion   mcg/min IntraVENous TITRATE  enoxaparin (LOVENOX) injection 100 mg  100 mg SubCUTAneous Q12H  
 magnesium oxide (MAG-OX) tablet 400 mg  400 mg Oral DAILY  famotidine (PF) (PEPCID) 20 mg in sodium chloride 0.9% 10 mL injection  20 mg IntraVENous Q24H  
 venlafaxine (EFFEXOR) tablet 150 mg  150 mg Oral DAILY  fentaNYL citrate (PF) injection  mcg   mcg IntraVENous Q1H PRN  pregabalin (LYRICA) capsule 200 mg  200 mg Oral BID  aspirin chewable tablet 81 mg  81 mg Oral DAILY  atorvastatin (LIPITOR) tablet 40 mg  40 mg Oral QHS  clonazePAM (KlonoPIN) tablet 0.5 mg  0.5 mg Oral DAILY PRN  
 sodium chloride (NS) flush 5-10 mL  5-10 mL IntraVENous Q8H  
 sodium chloride (NS) flush 5-10 mL  5-10 mL IntraVENous PRN  
 acetaminophen (TYLENOL) tablet 650 mg  650 mg Oral Q6H PRN  
 docusate sodium (COLACE) capsule 100 mg  100 mg Oral DAILY PRN  propofol (DIPRIVAN) infusion  0-50 mcg/kg/min IntraVENous TITRATE  amiodarone (CORDARONE) 375 mg/250 mL D5W infusion  0.5 mg/min IntraVENous TITRATE  prochlorperazine (COMPAZINE) with saline injection 5 mg  5 mg IntraVENous Q6H PRN  
 bumetanide (BUMEX) injection 2 mg  2 mg IntraVENous Q12H  mupirocin (BACTROBAN) 2 % ointment   Both Nostrils BID  chlorhexidine (PERIDEX) 0.12 % mouthwash 15 mL  15 mL Oral BID Lab Review:  
 
Recent Labs 10/09/18 
 1650  10/08/18 
 3048  10/07/18 
 7220 WBC  9.1  12.3*  10.4 HGB  10.4*  11.5  11.9 HCT  32.9*  36.9  36.9 PLT  165  175  187 Recent Labs 10/09/18 
 0249  10/08/18 
 0828  10/07/18 
 1759   10/06/18 
 1026 NA  144  144  142   < >  145  
K  2.9*  3.5  2.9*   < >  2.8*  
CL  108  108  106   < >  108 CO2  31  28  28   < >  28 GLU  132*  126*  155*   < >  140* BUN  25*  19  19   < >  22* CREA  1.53*  1.57*  1.54*   < >  1.35* CA  7.6*  7.8*  7.8*   < >  8.4* MG  2.2  2.3   --    --   1.9 PHOS  3.3  2.7   --    --    --   
ALB  2.6*  2.8*   --    --   3.3* TBILI  0.6  1.0   --    --   1.0 SGOT  16  20   --    --   17 ALT  13  15   --    --   20  
 < > = values in this interval not displayed. Lab Results Component Value Date/Time Glucose (POC) 97 01/20/2017 07:23 AM  
 Glucose (POC) 127 (H) 01/19/2017 11:03 PM  
 Glucose (POC) 122 (H) 01/19/2017 05:34 PM  
 Glucose (POC) 109 (H) 01/19/2017 04:40 PM  
 Glucose (POC) 102 (H) 01/19/2017 09:53 AM  
 
Recent Labs 10/08/18 
 6266  10/07/18 
 0357  10/06/18 
 1700 PH  7.49*  7.56*  7.35  
PCO2  36  29*  44 PO2  86  74*  84  
 HCO3  27*  26  24 FIO2  40  50  100 No results for input(s): INR in the last 72 hours. No lab exists for component: INREXT, INREXT Assessment:  
 
Principal Problem: 
  Acute respiratory failure with hypoxemia ([facility] (10/6/2018) Active Problems: 
  Type 2 diabetes mellitus with diabetic neuropathy, without long-term current use of insulin ([facility] (6/5/2016) Acute systolic heart failure ([facility] (4/4/2014) Atrial fibrillation with rapid ventricular response ([facility] (10/7/2018) Hypokalemia (10/7/2018) Plan:  
 
Risk of deterioration: high 1. Continue vent/respiratory support 2. Continue IV bumex, amiodarone 3. Continue aggressive K+ replacement 4. ? Start KCL supplementation via OGT 5. Cardiology following 6. Follow labs Care Plan discussed with: Nursing Staff Discussed:  Care Plan Prophylaxis:  Lovenox and SCD's Disposition:  Unknown 
        
___________________________________________________ Attending Physician: Danny Crooks MD

## 2018-10-09 NOTE — PROGRESS NOTES
Cardiology Progress Note 
10/9/2018    Admit Date: 10/6/2018 Admit Diagnosis: Acute respiratory failure (HCC)  CC:  None currently Cardiac Assessment/Plan:  
Ms Tano Gonzalez has a h/o: 
1) CAD (LAD ISELA 2014 for NQMI), Neg PET for ischemia 9/2018. 
2) mixed ischemic/tachycardia mediated CM 4/2014 (EF 20%); EF 45% 11/2017. Klein-Chatman was too expensive. 3) HTN, 4) PAfib (RFA 4/2016 and 1/2017), Recurrrent/persistent afib 2017/2018 5) DM,  
6) SHAYLA (on CPAP),  
7) CKD (Cr 1.5 range). Aldactone stopped in 2014. 8) [facility] Don PPM 7/2014 for bradycardia while on therapy for AFib. CAD: LAD ISELA 2014; recent PET w/o ischemia but recurrent low EF: if not improved with med Rx, may need repeat cath. CM: Echo 10/9/18: EF 15-20%. If cant diurese well, may need milrinone. Eventually will need to get back on ACEi. Good UOP so far with IV diuretics. HTN: off metoprolol/lisinopril with low BPs after ETT/sedation: Levophed off since evening of 10/7; change to prn Jose: restart BBlocker: iv prn then PO as able. Rhythm: PAFib; recurrent; remains on IV amio; Dr Antonio Villasenor to see (considering AV node ablation/BiV PM or ICD). Cont anticoagulation. Renal function: Cr stable in 1.5 range; (Cr 1.3 range typically). Resp failure, SHAYLA, DM, Dispo: per priamry team.  
For other plans, see orders. 10/9: I will not be here tomorrow nor the rest if this week. Dr Miya Elizabeth will see the patient in my absence. @ OV 10/2/18: 
Ms Tano Gonzalez has a h/o: 
1) CAD (LAD ISELA 2014 for NQMI), Neg PET for ischemia 9/2018. 
2) mixed ischemic/tachycardia mediated CM 4/2014 (EF 20%); EF 45% 11/2017. Klein-Chatman was too expensive. 3) HTN, 4) PAfib (RFA 4/2016 and 1/2017), Recurrrent/persistent afib 2017/2018 5) DM,  
6) SHAYLA (on CPAP),  
7) CKD (Cr 1.5 range). Aldactone stopped in 2014. 8) St Don PPM 7/2014 for bradycardia while on therapy for AFib. 11/2017: No CP/STEEN; Back in afib today:  Coreg changed Toprol-XL. 10/2018:  Recent ER visit with epigastric pain; negative evaluation there & seen by Dr. Sophia Min who set up a cardiac PET. The PET shows no ischemia but EF suggested at 16%. Of note patient does have AFib with accelerated ventricular response. No recurrence of epigastric discomfort. No bleeding. Had a simple trip at home a few weeks ago without injury. IMPRESSION AND PLAN 
  
01. (I25.10) Atherosclerotic heart disease of native coronary artery without angina pectoris:  No anginal symptoms. Cont asa and stopped plavix with need for anticoagulation. We discussed the signs and symptoms of unstable angina, myocardial infarction and malignant arrhythmia. The patient knows to seek immediate medical attention should they occur. ECG done today 02. (I42.0) Dilated cardiomyopathy:  Mixed ischemic/non-ischemic (tachycardia mediated) CM. Her ejection fraction is greater than 35% after repeat echo. The patient's systolic function has been stable. I suspect the PET has a falsely low EF related to her AFib but repeat echo is needed. 2-D w/CFD Echo to be done first available. 03. (I50.42) Chronic combined systolic (congestive) and diastolic (congestive) heart failure:  Resolved exertional symptoms. (NYHA I)  The patient is compliant with their CHF regimen. 04. (I48.1) Persistent atrial fibrillation:  Management per Dr Lana Snowden; currently off amio and on Pradaxa. Heart rate has been to elevated; increase Toprol  q.day. Further AFib monitoring/therapy per Dr. Sourav Lechuga. 05. (I13.0) Hyp hrt & chr kdny dis w hrt fail and stg 1-4/unsp chr kdny:  Adequately controlled. We will see how the patient does with the med changes noted above. 06. (E78.2) Mixed hyperlipidemia:  Lipid labs drawn by PCP. The patient is tolerating lipid lowering therapy well.   
07. (N18.3) Chronic kidney disease, stage 3 (moderate):  Cr 1.24/GFR46 11/2015. Cr 1.32/GFR 43 1/2017.  
08. (R60.0) Localized edema:  Resolved. She avoids salt. 09. (E11.69) Type 2 diabetes mellitus with other specified complication:  Lipids & HTN as noted above; DM managed by other MD.  
10. (Z95.0) Presence of cardiac pacemaker:  will continue to be followed in device clinic. 11. (Z79.82) Long term (current) use of aspirin:  This condition is stable. 12. (Z79.01) Long term (current) use of anticoagulants: This condition is stable. Indicated for atrial fibrillation. No bleeding. If she has recurrent falls, she knows to call for re-evaluation of anticoagulation. 13. (Z68.38) Body mass index (BMI) 38.0-38.9, adult:  The patient was instructed on AHA diet and regular exercise. ORDERS: 
    
1 ECG done today 2 2-D w/ CFD Echocardiogram first available. 3 Return office visit with Xenia Little MD in 6 Months. 4 The patient was instructed on AHA diet and regular exercise. 10/2/18 MEDICATION LIST Medication Sig Description  
amlodipine 5 mg tablet take 1 tablet by oral route  every day per pc  
aspirin 81 mg tablet,delayed release take 1 tablet by oral route  every day  
atorvastatin 40 mg tablet take 1 tablet by oral route  every evening  
bumetanide 1 mg tablet 1 in am on tues&thursday 2 tablets on mon wed fridays Calcium 600 600 mg (1,500 mg) tablet daily  
clonazepam 0.5 mg tablet take 1 tablet by oral route  every day LISINOPRIL 20 MG Tablet TAKE 1 TABLET EVERY DAY Lyrica 200 mg capsule take 1 capsule by oral route 2 times every day  
magnesium 250 mg tablet take 1 tablet by oral route  every day  
metoprolol succinate  mg tablet,extended release 24 hr take 1 tablet by oral route  every day  
potassium gluconate 500 mg (83 mg) tablet daily Pradaxa 150 mg capsule take 1 capsule by oral route 2 times every day Premarin 0.625 mg/gram vaginal cream insert 0.5 applicatorful by vaginal route  every day cyclically, 3 weeks on and 1 week off venlafaxine 75 mg tablet 2 po bid Vitamin D3 1,000 unit capsule take 1 daily  
 
____________________________________________________________________________________________________ CARDIAC HISTORY 
CAD: 
   
1 NSTEMI [95% Proximal LAD, Resolute (ISELA) to the Proximal LAD.] - 4/8/2014 CHF/CM: 
   
1 Mixed ischemic/tachycardic CM. [Nl TSH.] - 4/2014  
2 Ischemic & tachycardic Cardiomyopathy [Echo (EF 0.45) - 06/17/2014, (prev EF 20-30%)] - 6/2014 ARRHYTHMIA: 
   
1 A Fib [DCCV, Plan: amio started; Eliquis with Effient (change eliquis to asa if NSR after 30 days). ] - 4/8/2014  
2 PAF - 8/2010  
3 A Fib, recurrent; and 6/2014. [Had marked sinus bradycardia while on bblocker/dig.] - 5/2014  
4 Sinus Bradycardia. - 6/2/2014  
5 PPM (Devices (Dual Chamber PPM (Dianna Gula)) - 7/17/2014) - 7/17/2014  
6 PAF [CVR; Eliquis restarted (plavix stopped). ] - 9/2015  
7 A Fib [RFA] - 4/2016  
8 A Fib [RFA] - 1/2017 RISK FACTORS: 
   
1 Diabetes [Diagnosed in 2014] 2 Hypertension 3 Dyslipidemia CARDIOVASCULAR PROCEDURES Procedure Date Results Cardiac PET 09/24/2018 EF 0.16 (16%), physiologic apical thinning with no ischemia Cath 04/08/2014 95% Proximal LAD, Resolute (ISELA) to the Proximal LAD. DCCV 04/08/2014 Initial Rhythm A Fib, Final Rhythm Sinus Devices 07/17/2014 Dual Chamber PPM (Dianna Gula) Echo 11/28/2017 EF 0.45 (45%), Mild Global Hypo, Mild TR, Mild LVH, Mild MR, Est RVSP 41 mmHg, Normal RV. (probable trileaflet AoV). Echo 10/30/2015 EF 0.45 (45%), Mild LVH, LA Diam 4.1 cm, Est RVSP 35 mmHg, Normal RV. ?bicuspid AoV; (Prob trileaflet on previous ANGELITO). Echo 06/17/2014 EF 0.45 (45%), EF range 45%-50%, Mild LV dilatation. Mild LV systolic dysfunction. ? Bicuspid AoV but prob trileafet on previous ANGELITO. Echo 04/03/2014 EF 0.20 (20%), global HK with lateral sparing; no valve disease. EKG 10/02/2018 Atrial Fib, ; nonspecific T-wave flattening. Holter 2014 No Malignant Arrhythmias, Atrial Fib, No correlation with symptoms, (, ave [address].) Holter 2014 No Malignant Arrhythmias, Atrial Fib, No correlation with symptoms, \"light or heavy chest\" = afib in 60s. HR  (ave 63). ASx pauses (upto 2.8) while asleep. RFA 2017 Indication A Fib, Final Rhythm Sinus RFA  Indication A Fib Sleep Study  OSAS: Moderate ANGELITO 2014 EF 0.35 (35%), No BRAYDON Clot, prob trileaflet AoV. ACTIVE ALLERGIES: 
Ingredient Reaction Comment SACUBITRIL [employer] is too expensive SULFA (SULFONAMIDE ANTIBIOTICS) AMOXICILLIN TRIHYDRATE    
POTASSIUM CLAVULANATE ACTIVE INTOLERANCES: 
Description Reaction Comment SACUBITRIL [employer] is too expensive PROBLEM LIST: 
Problem Description Chronic Benign essential HTN Y  
DM type 2 N A-fib Y  
CAD Y Mixed hyperlipidemia Y  
 
 
PAST MEDICAL/SURGICAL HISTORY  (Detailed) Disease/disorder Onset Date Management Date Comments  
  hysterectomy A-FIB Cardiovascular disease CHF Hyperlipidemia Hypertension SHAYLA: moderate. 2014 Family History  (Detailed) Relationship Family Member Name  Age at Death Condition Onset Age Cause of Death Brother    Hypertension  N Mother    PAD  N Mother    Hypertension  N Mother    Cardiac arrhythmias  N Sister    Diabetes mellitus  N  
 
SOCIAL HISTORY  (Detailed) Preferred language is [place]. EDUCATION/EMPLOYMENT/OCCUPATION Employment History Status Retired Restrictions  
  retired    
  retired MARITAL STATUS/FAMILY/SOCIAL SUPPORT Currently . High complexity decision making was performed  X Yes High-risk of decompensation with multiple organ involvement X Yes Hospital problem list  
Active Hospital Problems Diagnosis Date Noted  Atrial fibrillation with rapid ventricular response ([facility] 10/07/2018  Hypokalemia 10/07/2018  Acute respiratory failure with hypoxemia ([facility] 10/06/2018  Type 2 diabetes mellitus with diabetic neuropathy, without long-term current use of insulin ([facility] 2016  Acute systolic heart failure ([facility] 2014 Subjective:  Patient reports  []   nothing; unable to communicate    [x]  just intubated Chest pain x none  consistent with:  Non-cardiac CP Atypical CP None now  On going  Anginal CP Dyspnea  none  at rest  with exertion    
   x improved  unchanged  worse PND x none  overnight Orthopnea x none  improved  unchanged  worse Presyncope x none  improved  unchanged  worse Ambulated in hallway without symptoms   Yes Ambulated in room without symptoms  Yes ROS Hematuria:  Yes X No Dysuria:  Yes X No  
            
(2+  other systems) Cough: Yes X No Sputum: Yes X No  
            
 BRBPR:  Yes X No Melena: Yes X No  
No change in family and social history from H&P/Consult note. Objective:  
 Physical Exam: 
24 hr VS reviewed, overall VSSAF Temp (24hrs), Av.5 °F (36.9 °C), Min:96.6 °F (35.9 °C), Max:99.3 °F (37.4 °C) Patient Vitals for the past 8 hrs: 
 Pulse 10/09/18 1000 (!) 112  
10/09/18 0900 (!) 110  
10/09/18 0800 (!) 103  
10/09/18 0730 (!) 103  
10/09/18 0700 (!) 110  
10/09/18 0600 (!) 118  
10/09/18 0500 (!) 104  
10/09/18 0400 (!) 112 Patient Vitals for the past 8 hrs: 
 Resp 10/09/18 1000 17  
10/09/18 0900 20  
10/09/18 0800 15  
10/09/18 0730 14  
10/09/18 0700 13  
10/09/18 0600 12  
10/09/18 0500 15  
10/09/18 0400 14 Patient Vitals for the past 8 hrs: 
 BP  
10/09/18 1000 118/90  
10/09/18 0900 135/40  
10/09/18 0800 127/86  
10/09/18 0700 (!) 115/95  
10/09/18 0600 115/85  
10/09/18 0500 96/61  
10/09/18 0400 (!) 88/51 Intake/Output Summary (Last 24 hours) at 10/09/18 1114 Last data filed at 10/09/18 1054 Gross per 24 hour Intake          1608.38 ml Output             1490 ml Net           118.38 ml General: x WD,WN; obese  Elderly  Cachetic x NAD Agitated  Lethargic  Arousable  Obtunded Sedated  On Bipap  Intubated ENT/Palate: X WNL  Dry MM  anicteric Respiratory: X Wheeze; ?stridor  Nl resp effort x Increased effort  No significant change  
 x rhonchi  rales  improved  worse Cardiovasc:  RRR x IRRR X Nl S1, S2 x No rub No murmur X No new murmur  Murmur c/w: x No gallop  
 x Min edema  BLE edema:+  RLE edema:+  LLE edema:+ Edema less  Edema more  Edema same  Edema worse X Nl JVP  Elevated JVP  JVP same  JVP worse X Carotid wnl x abd aorta not palpated X no peripheral emboli noted GI: X abd soft x nondistended X BS present X No organo- megaly noted Skin: X Warm, dry  Cold extremites Neuro: x A/seems O  Grossly non- focal  Obtunded  Sedated Lethargic  Arousable  intubated    
 
cath site intact w/o hematoma or new bruit; distal pulse unchanged  Yes Data Review:    
Telemetry independently reviewed  sinus  chronic afib x parox afib  NSVT  
 
ECG independently reviewed  NSR  afib  no significant changes  NSST-Tw chgs  
x no new ECG provided for review Lab results reviewed as noted below. Current medications reviewed as noted below. Recent Labs 10/09/18 
 1030  10/08/18 
 1519 PH  7.49*  7.49* PCO2  37  36 PO2  70*  86 Recent Labs 10/06/18 
 1731 TROIQ  0.05* Recent Labs 10/09/18 
 2868  10/08/18 
 3025  10/07/18 
 1759  10/07/18 
 4835 NA  144  144  142  146*  
K  2.9*  3.5  2.9*  2.7*  
CL  108  108  106  108 CO2  31  28  28  29 BUN  25*  19  19  21* CREA  1.53*  1.57*  1.54*  1.38* GFRAA  41*  40*  41*  46* GLU  132*  126*  155*  131* PHOS  3.3  2.7   --    --   
CA  7.6*  7.8*  7.8*  7.9* ALB  2.6*  2.8*   --    --   
WBC  9.1  12.3*   --   10.4 HGB  10.4*  11.5   --   11.9 HCT  32.9*  36.9   --   36.9 PLT  165  175   --   187 Recent Labs 10/09/18 
 2103  10/08/18 
 9011 SGOT  16  20 AP  92  104 TBILI  0.6  1.0 TP  5.8*  6.3* ALB  2.6*  2.8*  
GLOB  3.2  3.5 No results for input(s): INR, PTP, APTT in the last 72 hours. No lab exists for component: INREXT, INREXT No results for input(s): FE, TIBC, PSAT, FERR in the last 72 hours. Lab Results Component Value Date/Time Glucose (POC) 97 01/20/2017 07:23 AM  
 Glucose (POC) 127 (H) 01/19/2017 11:03 PM  
 Glucose (POC) 122 (H) 01/19/2017 05:34 PM  
 Glucose (POC) 109 (H) 01/19/2017 04:40 PM  
 Glucose (POC) 102 (H) 01/19/2017 09:53 AM  
 
 
Current Facility-Administered Medications Medication Dose Route Frequency  metoprolol (LOPRESSOR) injection 2.5 mg  2.5 mg IntraVENous ONCE  
 metoprolol (LOPRESSOR) injection 5 mg  5 mg IntraVENous Q4H PRN  
 PHENYLephrine (EZRA-SYNEPHRINE) 30 mg in 0.9% sodium chloride 250 mL infusion   mcg/min IntraVENous TITRATE  enoxaparin (LOVENOX) injection 100 mg  100 mg SubCUTAneous Q12H  
 magnesium oxide (MAG-OX) tablet 400 mg  400 mg Oral DAILY  famotidine (PF) (PEPCID) 20 mg in sodium chloride 0.9% 10 mL injection  20 mg IntraVENous Q24H  
 venlafaxine (EFFEXOR) tablet 150 mg  150 mg Oral DAILY  fentaNYL citrate (PF) injection  mcg   mcg IntraVENous Q1H PRN  pregabalin (LYRICA) capsule 200 mg  200 mg Oral BID  aspirin chewable tablet 81 mg  81 mg Oral DAILY  atorvastatin (LIPITOR) tablet 40 mg  40 mg Oral QHS  clonazePAM (KlonoPIN) tablet 0.5 mg  0.5 mg Oral DAILY PRN  
 sodium chloride (NS) flush 5-10 mL  5-10 mL IntraVENous Q8H  
 sodium chloride (NS) flush 5-10 mL  5-10 mL IntraVENous PRN  
 acetaminophen (TYLENOL) tablet 650 mg  650 mg Oral Q6H PRN  
  docusate sodium (COLACE) capsule 100 mg  100 mg Oral DAILY PRN  propofol (DIPRIVAN) infusion  0-50 mcg/kg/min IntraVENous TITRATE  amiodarone (CORDARONE) 375 mg/250 mL D5W infusion  0.5 mg/min IntraVENous TITRATE  prochlorperazine (COMPAZINE) with saline injection 5 mg  5 mg IntraVENous Q6H PRN  
 bumetanide (BUMEX) injection 2 mg  2 mg IntraVENous Q12H  mupirocin (BACTROBAN) 2 % ointment   Both Nostrils BID  chlorhexidine (PERIDEX) 0.12 % mouthwash 15 mL  15 mL Oral BID Tyler Salgado MD

## 2018-10-10 ENCOUNTER — APPOINTMENT (OUTPATIENT)
Dept: GENERAL RADIOLOGY | Age: 68
DRG: 226 | End: 2018-10-10
Attending: INTERNAL MEDICINE
Payer: MEDICARE

## 2018-10-10 LAB
ALBUMIN SERPL-MCNC: 2.8 G/DL (ref 3.5–5)
ALBUMIN/GLOB SERPL: 0.7 {RATIO} (ref 1.1–2.2)
ALP SERPL-CCNC: 103 U/L (ref 45–117)
ALT SERPL-CCNC: 14 U/L (ref 12–78)
ANION GAP SERPL CALC-SCNC: 6 MMOL/L (ref 5–15)
AST SERPL-CCNC: 13 U/L (ref 15–37)
BASOPHILS # BLD: 0 K/UL (ref 0–0.1)
BASOPHILS NFR BLD: 0 % (ref 0–1)
BILIRUB SERPL-MCNC: 0.8 MG/DL (ref 0.2–1)
BUN SERPL-MCNC: 23 MG/DL (ref 6–20)
BUN/CREAT SERPL: 18 (ref 12–20)
CALCIUM SERPL-MCNC: 8.3 MG/DL (ref 8.5–10.1)
CHLORIDE SERPL-SCNC: 107 MMOL/L (ref 97–108)
CO2 SERPL-SCNC: 30 MMOL/L (ref 21–32)
CREAT SERPL-MCNC: 1.28 MG/DL (ref 0.55–1.02)
DIFFERENTIAL METHOD BLD: ABNORMAL
EOSINOPHIL # BLD: 0 K/UL (ref 0–0.4)
EOSINOPHIL NFR BLD: 0 % (ref 0–7)
ERYTHROCYTE [DISTWIDTH] IN BLOOD BY AUTOMATED COUNT: 18.2 % (ref 11.5–14.5)
GLOBULIN SER CALC-MCNC: 4.1 G/DL (ref 2–4)
GLUCOSE BLD STRIP.AUTO-MCNC: 124 MG/DL (ref 65–100)
GLUCOSE BLD STRIP.AUTO-MCNC: 168 MG/DL (ref 65–100)
GLUCOSE BLD STRIP.AUTO-MCNC: 216 MG/DL (ref 65–100)
GLUCOSE SERPL-MCNC: 175 MG/DL (ref 65–100)
HCT VFR BLD AUTO: 37.4 % (ref 35–47)
HGB BLD-MCNC: 11.9 G/DL (ref 11.5–16)
IMM GRANULOCYTES # BLD: 0.1 K/UL (ref 0–0.04)
IMM GRANULOCYTES NFR BLD AUTO: 1 % (ref 0–0.5)
LYMPHOCYTES # BLD: 0.8 K/UL (ref 0.8–3.5)
LYMPHOCYTES NFR BLD: 11 % (ref 12–49)
MAGNESIUM SERPL-MCNC: 2.2 MG/DL (ref 1.6–2.4)
MCH RBC QN AUTO: 28.5 PG (ref 26–34)
MCHC RBC AUTO-ENTMCNC: 31.8 G/DL (ref 30–36.5)
MCV RBC AUTO: 89.5 FL (ref 80–99)
MONOCYTES # BLD: 0.2 K/UL (ref 0–1)
MONOCYTES NFR BLD: 3 % (ref 5–13)
NEUTS SEG # BLD: 6.1 K/UL (ref 1.8–8)
NEUTS SEG NFR BLD: 85 % (ref 32–75)
NRBC # BLD: 0 K/UL (ref 0–0.01)
NRBC BLD-RTO: 0 PER 100 WBC
PHOSPHATE SERPL-MCNC: 2.9 MG/DL (ref 2.6–4.7)
PLATELET # BLD AUTO: 163 K/UL (ref 150–400)
PMV BLD AUTO: 12.1 FL (ref 8.9–12.9)
POTASSIUM SERPL-SCNC: 3.2 MMOL/L (ref 3.5–5.1)
PROT SERPL-MCNC: 6.9 G/DL (ref 6.4–8.2)
RBC # BLD AUTO: 4.18 M/UL (ref 3.8–5.2)
RBC MORPH BLD: ABNORMAL
SERVICE CMNT-IMP: ABNORMAL
SODIUM SERPL-SCNC: 143 MMOL/L (ref 136–145)
WBC # BLD AUTO: 7.2 K/UL (ref 3.6–11)

## 2018-10-10 PROCEDURE — 77030038269 HC DRN EXT URIN PURWCK BARD -A

## 2018-10-10 PROCEDURE — 71045 X-RAY EXAM CHEST 1 VIEW: CPT

## 2018-10-10 PROCEDURE — 74011250636 HC RX REV CODE- 250/636: Performed by: INTERNAL MEDICINE

## 2018-10-10 PROCEDURE — 74011250637 HC RX REV CODE- 250/637: Performed by: INTERNAL MEDICINE

## 2018-10-10 PROCEDURE — 77030011256 HC DRSG MEPILEX <16IN NO BORD MOLN -A

## 2018-10-10 PROCEDURE — 84100 ASSAY OF PHOSPHORUS: CPT | Performed by: INTERNAL MEDICINE

## 2018-10-10 PROCEDURE — 77030037878 HC DRSG MEPILEX >48IN BORD MOLN -B

## 2018-10-10 PROCEDURE — 80053 COMPREHEN METABOLIC PANEL: CPT | Performed by: INTERNAL MEDICINE

## 2018-10-10 PROCEDURE — 65660000000 HC RM CCU STEPDOWN

## 2018-10-10 PROCEDURE — 82962 GLUCOSE BLOOD TEST: CPT

## 2018-10-10 PROCEDURE — 74011000258 HC RX REV CODE- 258: Performed by: INTERNAL MEDICINE

## 2018-10-10 PROCEDURE — 77030019607 HC DSG BURN S&N -A

## 2018-10-10 PROCEDURE — 74011636637 HC RX REV CODE- 636/637: Performed by: INTERNAL MEDICINE

## 2018-10-10 PROCEDURE — 85025 COMPLETE CBC W/AUTO DIFF WBC: CPT | Performed by: INTERNAL MEDICINE

## 2018-10-10 PROCEDURE — 36415 COLL VENOUS BLD VENIPUNCTURE: CPT | Performed by: INTERNAL MEDICINE

## 2018-10-10 PROCEDURE — 74011000250 HC RX REV CODE- 250: Performed by: INTERNAL MEDICINE

## 2018-10-10 PROCEDURE — 83735 ASSAY OF MAGNESIUM: CPT | Performed by: INTERNAL MEDICINE

## 2018-10-10 RX ORDER — MAGNESIUM SULFATE 100 %
4 CRYSTALS MISCELLANEOUS AS NEEDED
Status: DISCONTINUED | OUTPATIENT
Start: 2018-10-10 | End: 2018-10-22 | Stop reason: HOSPADM

## 2018-10-10 RX ORDER — METOPROLOL TARTRATE 25 MG/1
25 TABLET, FILM COATED ORAL EVERY 12 HOURS
Status: DISCONTINUED | OUTPATIENT
Start: 2018-10-10 | End: 2018-10-15

## 2018-10-10 RX ORDER — DEXTROSE 50 % IN WATER (D50W) INTRAVENOUS SYRINGE
12.5-25 AS NEEDED
Status: DISCONTINUED | OUTPATIENT
Start: 2018-10-10 | End: 2018-10-22 | Stop reason: HOSPADM

## 2018-10-10 RX ORDER — MAGNESIUM HYDROXIDE 400 MG/5ML
SUSPENSION, ORAL (FINAL DOSE FORM) ORAL DAILY
COMMUNITY
End: 2018-11-09

## 2018-10-10 RX ORDER — DEXAMETHASONE SODIUM PHOSPHATE 4 MG/ML
4 INJECTION, SOLUTION INTRA-ARTICULAR; INTRALESIONAL; INTRAMUSCULAR; INTRAVENOUS; SOFT TISSUE EVERY 12 HOURS
Status: DISCONTINUED | OUTPATIENT
Start: 2018-10-10 | End: 2018-10-11

## 2018-10-10 RX ORDER — VENLAFAXINE HYDROCHLORIDE 150 MG/1
150 CAPSULE, EXTENDED RELEASE ORAL 2 TIMES DAILY
Status: DISCONTINUED | OUTPATIENT
Start: 2018-10-10 | End: 2018-10-22 | Stop reason: HOSPADM

## 2018-10-10 RX ORDER — INSULIN LISPRO 100 [IU]/ML
INJECTION, SOLUTION INTRAVENOUS; SUBCUTANEOUS
Status: DISCONTINUED | OUTPATIENT
Start: 2018-10-10 | End: 2018-10-22 | Stop reason: HOSPADM

## 2018-10-10 RX ORDER — POTASSIUM CHLORIDE 14.9 MG/ML
10 INJECTION INTRAVENOUS
Status: COMPLETED | OUTPATIENT
Start: 2018-10-10 | End: 2018-10-12

## 2018-10-10 RX ADMIN — PREGABALIN 200 MG: 100 CAPSULE ORAL at 17:34

## 2018-10-10 RX ADMIN — AMIODARONE HYDROCHLORIDE 150 MG: 50 INJECTION, SOLUTION INTRAVENOUS at 09:22

## 2018-10-10 RX ADMIN — VENLAFAXINE 150 MG: 37.5 TABLET ORAL at 09:21

## 2018-10-10 RX ADMIN — Medication 10 ML: at 13:12

## 2018-10-10 RX ADMIN — AMIODARONE HYDROCHLORIDE 0.5 MG/MIN: 50 INJECTION, SOLUTION INTRAVENOUS at 05:11

## 2018-10-10 RX ADMIN — METOPROLOL TARTRATE 25 MG: 25 TABLET, FILM COATED ORAL at 21:01

## 2018-10-10 RX ADMIN — VENLAFAXINE HYDROCHLORIDE 150 MG: 150 CAPSULE, EXTENDED RELEASE ORAL at 17:34

## 2018-10-10 RX ADMIN — FAMOTIDINE 20 MG: 10 INJECTION, SOLUTION INTRAVENOUS at 09:22

## 2018-10-10 RX ADMIN — METOPROLOL TARTRATE 25 MG: 25 TABLET, FILM COATED ORAL at 09:21

## 2018-10-10 RX ADMIN — BUMETANIDE 2 MG: 0.25 INJECTION INTRAMUSCULAR; INTRAVENOUS at 00:09

## 2018-10-10 RX ADMIN — CHLORHEXIDINE GLUCONATE 15 ML: 1.2 RINSE ORAL at 17:34

## 2018-10-10 RX ADMIN — ATORVASTATIN CALCIUM 40 MG: 40 TABLET, FILM COATED ORAL at 21:01

## 2018-10-10 RX ADMIN — ENOXAPARIN SODIUM 100 MG: 100 INJECTION, SOLUTION INTRAVENOUS; SUBCUTANEOUS at 05:47

## 2018-10-10 RX ADMIN — BUMETANIDE 2 MG: 0.25 INJECTION INTRAMUSCULAR; INTRAVENOUS at 23:25

## 2018-10-10 RX ADMIN — MUPIROCIN: 20 OINTMENT TOPICAL at 09:47

## 2018-10-10 RX ADMIN — CHLORHEXIDINE GLUCONATE 15 ML: 1.2 RINSE ORAL at 09:47

## 2018-10-10 RX ADMIN — POTASSIUM CHLORIDE 10 MEQ: 200 INJECTION, SOLUTION INTRAVENOUS at 08:07

## 2018-10-10 RX ADMIN — DEXAMETHASONE SODIUM PHOSPHATE 4 MG: 4 INJECTION, SOLUTION INTRAMUSCULAR; INTRAVENOUS at 17:35

## 2018-10-10 RX ADMIN — Medication 400 MG: at 09:21

## 2018-10-10 RX ADMIN — PREGABALIN 200 MG: 100 CAPSULE ORAL at 09:21

## 2018-10-10 RX ADMIN — Medication 10 ML: at 22:20

## 2018-10-10 RX ADMIN — POTASSIUM CHLORIDE 10 MEQ: 200 INJECTION, SOLUTION INTRAVENOUS at 09:47

## 2018-10-10 RX ADMIN — POTASSIUM CHLORIDE 10 MEQ: 200 INJECTION, SOLUTION INTRAVENOUS at 05:43

## 2018-10-10 RX ADMIN — MUPIROCIN: 20 OINTMENT TOPICAL at 17:35

## 2018-10-10 RX ADMIN — ENOXAPARIN SODIUM 100 MG: 100 INJECTION, SOLUTION INTRAVENOUS; SUBCUTANEOUS at 17:34

## 2018-10-10 RX ADMIN — ASPIRIN 81 MG CHEWABLE TABLET 81 MG: 81 TABLET CHEWABLE at 09:21

## 2018-10-10 RX ADMIN — INSULIN LISPRO 2 UNITS: 100 INJECTION, SOLUTION INTRAVENOUS; SUBCUTANEOUS at 12:07

## 2018-10-10 RX ADMIN — POTASSIUM CHLORIDE 10 MEQ: 200 INJECTION, SOLUTION INTRAVENOUS at 09:21

## 2018-10-10 RX ADMIN — DEXAMETHASONE SODIUM PHOSPHATE 6 MG: 4 INJECTION, SOLUTION INTRAMUSCULAR; INTRAVENOUS at 05:46

## 2018-10-10 RX ADMIN — AMIODARONE HYDROCHLORIDE 0.5 MG/MIN: 50 INJECTION, SOLUTION INTRAVENOUS at 19:42

## 2018-10-10 RX ADMIN — BUMETANIDE 2 MG: 0.25 INJECTION INTRAMUSCULAR; INTRAVENOUS at 11:51

## 2018-10-10 NOTE — PROGRESS NOTES
Transitional Care [facility] Note Patient: Shalini Ruiz MRN: [id number]  SSN: [id number] YOB: 1950  Age: 79 y.o. Sex: female Admit Date: 10/6/2018 LOS: 4 days Subjective:  
 
Patient admitted for  chest pain and palpitations. she then developed ARF from AF with RVR requiring intubation, she was successfully extubated yesterday and is currently on O2 2LPNC. Significant cardiac history including Chronic AF s/p ablation x 2. CAD with ISELA to LAD in 2014. CKD DM, HTN, HLD and SHAYLA . Echo demonstrated 15 -20% EF. This afternoon patient appears with general malaise, cooperative,  Follows commands. Generalized fatigue. She is oriented. States she is in hospital due to her \"diabetes\". Overall slowly progressing No family present during this assessment. Will defer ACP discussions till spouse is present tomorrow per patient report. ROS:  
 
ROS Unable to assess due to patients condition. Objective:  
 
Current Facility-Administered Medications Medication Dose Route Frequency  metoprolol tartrate (LOPRESSOR) tablet 25 mg  25 mg Oral Q12H  
 dexamethasone (DECADRON) 4 mg/mL injection 4 mg  4 mg IntraVENous Q12H  
 insulin lispro (HUMALOG) injection   SubCUTAneous AC&HS  
 glucose chewable tablet 16 g  4 Tab Oral PRN  
 dextrose (D50W) injection syrg 12.5-25 g  12.5-25 g IntraVENous PRN  
 glucagon (GLUCAGEN) injection 1 mg  1 mg IntraMUSCular PRN  
 venlafaxine-SR (EFFEXOR-XR) capsule 150 mg  150 mg Oral BID  metoprolol (LOPRESSOR) injection 5 mg  5 mg IntraVENous Q4H PRN  
 enoxaparin (LOVENOX) injection 100 mg  100 mg SubCUTAneous Q12H  
 magnesium oxide (MAG-OX) tablet 400 mg  400 mg Oral DAILY  fentaNYL citrate (PF) injection  mcg   mcg IntraVENous Q1H PRN  pregabalin (LYRICA) capsule 200 mg  200 mg Oral BID  aspirin chewable tablet 81 mg  81 mg Oral DAILY  atorvastatin (LIPITOR) tablet 40 mg  40 mg Oral QHS  clonazePAM (KlonoPIN) tablet 0.5 mg  0.5 mg Oral DAILY PRN  
 sodium chloride (NS) flush 5-10 mL  5-10 mL IntraVENous Q8H  
 sodium chloride (NS) flush 5-10 mL  5-10 mL IntraVENous PRN  
 acetaminophen (TYLENOL) tablet 650 mg  650 mg Oral Q6H PRN  
 docusate sodium (COLACE) capsule 100 mg  100 mg Oral DAILY PRN  
 amiodarone (CORDARONE) 375 mg/250 mL D5W infusion  0.5 mg/min IntraVENous TITRATE  prochlorperazine (COMPAZINE) with saline injection 5 mg  5 mg IntraVENous Q6H PRN  
 bumetanide (BUMEX) injection 2 mg  2 mg IntraVENous Q12H  mupirocin (BACTROBAN) 2 % ointment   Both Nostrils BID  chlorhexidine (PERIDEX) 0.12 % mouthwash 15 mL  15 mL Oral BID Patient Vitals for the past 24 hrs: 
 Temp Pulse Resp BP SpO2  
10/10/18 1400 - (!) 104 10 113/75 95 % 10/10/18 1300 - (!) 105 17 (!) 136/93 95 % 10/10/18 1200 97.8 °F (36.6 °C) (!) 105 15 (!) 125/97 97 % 10/10/18 1151 - (!) 105 - 116/86 -  
10/10/18 1100 - (!) 108 10 116/86 97 % 10/10/18 1000 - (!) 112 13 136/81 99 % 10/10/18 0921 - (!) 113 - (!) 139/92 -  
10/10/18 0900 - (!) 109 10 (!) 139/92 96 % 10/10/18 0800 - (!) 110 13 134/90 97 % 10/10/18 0730 97.8 °F (36.6 °C) (!) 110 15 - 97 % 10/10/18 0700 - (!) 103 15 167/89 96 % 10/10/18 0600 - (!) 112 11 (!) 159/106 96 % 10/10/18 0500 - (!) 113 12 (!) 143/97 96 % 10/10/18 0400 97.9 °F (36.6 °C) (!) 118 9 137/83 96 % 10/10/18 0300 - (!) 109 14 135/72 95 % 10/10/18 0200 - (!) 107 13 (!) 136/93 94 % 10/10/18 0145 - (!) 110 12 158/81 94 % 10/10/18 0100 - (!) 110 19 (!) 160/106 94 % 10/10/18 0000 98.2 °F (36.8 °C) 90 10 148/90 95 % 10/09/18 2300 - (!) 108 10 132/75 95 % 10/09/18 2200 - (!) 108 11 120/84 96 % 10/09/18 2100 - (!) 112 14 156/72 95 % 10/09/18 2000 97.9 °F (36.6 °C) (!) 101 16 139/82 95 % 10/09/18 1800 - (!) 101 15 131/90 95 % 10/09/18 1700 - 96 16 146/86 95 % 10/09/18 1600 - 98 16 125/70 96 % 10/09/18 1530 98.9 °F (37.2 °C) 90 17 - 95 % Intake and Output: 
 
Intake/Output Summary (Last 24 hours) at 10/10/18 1501 Last data filed at 10/10/18 1400 Gross per 24 hour Intake           568.75 ml Output             2290 ml Net         -1721.25 ml Physical Exam: 
Visit Vitals  /75 (BP 1 Location: Right arm, BP Patient Position: At rest)  Pulse (!) 104  Temp 97.8 °F (36.6 °C)  Resp 10  
 Ht 5' 9\" (1.753 m)  Wt 103.7 kg (228 lb 9.9 oz)  SpO2 95%  Breastfeeding No  
 BMI 33.76 kg/m2 General appearance: alert, fatigued, cooperative, pale Head: Normocephalic, without obvious abnormality, atraumatic Eyes: conjunctivae/corneas clear. PERRL, EOM's intact. Fundi benign Throat: Lips, mucosa, and tongue normal. Teeth and gums normal 
Lungs: clear to auscultation bilaterally Heart: regular rate and rhythm, S1, S2 normal, no murmur, click, rub or gallop Extremities: extremities normal, atraumatic, no cyanosis or edema, no edema, redness or tenderness Skin: Pale Skin color, texture, turgor normal. No rashes or lesions Lab/Data Review:  
Recent Results (from the past 24 hour(s)) CBC WITH AUTOMATED DIFF Collection Time: 10/10/18  4:18 AM  
Result Value Ref Range WBC 7.2 3.6 - 11.0 K/uL  
 RBC 4.18 3.80 - 5.20 M/uL  
 HGB 11.9 11.5 - 16.0 g/dL HCT 37.4 35.0 - 47.0 % MCV 89.5 80.0 - 99.0 FL  
 MCH 28.5 26.0 - 34.0 PG  
 MCHC 31.8 30.0 - 36.5 g/dL  
 RDW 18.2 (H) 11.5 - 14.5 % PLATELET 185 785 - 592 K/uL MPV 12.1 8.9 - 12.9 FL  
 NRBC 0.0 0  WBC ABSOLUTE NRBC 0.00 0.00 - 0.01 K/uL NEUTROPHILS 85 (H) 32 - 75 % LYMPHOCYTES 11 (L) 12 - 49 % MONOCYTES 3 (L) 5 - 13 % EOSINOPHILS 0 0 - 7 % BASOPHILS 0 0 - 1 % IMMATURE GRANULOCYTES 1 (H) 0.0 - 0.5 % ABS. NEUTROPHILS 6.1 1.8 - 8.0 K/UL  
 ABS. LYMPHOCYTES 0.8 0.8 - 3.5 K/UL  
 ABS. MONOCYTES 0.2 0.0 - 1.0 K/UL  
 ABS. EOSINOPHILS 0.0 0.0 - 0.4 K/UL  
 ABS. BASOPHILS 0.0 0.0 - 0.1 K/UL ABS. IMM. GRANS. 0.1 (H) 0.00 - 0.04 K/UL  
 DF AUTOMATED    
 RBC COMMENTS ANISOCYTOSIS 
1+ METABOLIC PANEL, COMPREHENSIVE Collection Time: 10/10/18  4:18 AM  
Result Value Ref Range Sodium 143 136 - 145 mmol/L Potassium 3.2 (L) 3.5 - 5.1 mmol/L Chloride 107 97 - 108 mmol/L  
 CO2 30 21 - 32 mmol/L Anion gap 6 5 - 15 mmol/L Glucose 175 (H) 65 - 100 mg/dL BUN 23 (H) 6 - 20 MG/DL Creatinine 1.28 (H) 0.55 - 1.02 MG/DL  
 BUN/Creatinine ratio 18 12 - 20 GFR est AA 51 (L) >60 ml/min/1.73m2 GFR est non-AA 42 (L) >60 ml/min/1.73m2 Calcium 8.3 (L) 8.5 - 10.1 MG/DL Bilirubin, total 0.8 0.2 - 1.0 MG/DL  
 ALT (SGPT) 14 12 - 78 U/L  
 AST (SGOT) 13 (L) 15 - 37 U/L Alk. phosphatase 103 45 - 117 U/L Protein, total 6.9 6.4 - 8.2 g/dL Albumin 2.8 (L) 3.5 - 5.0 g/dL Globulin 4.1 (H) 2.0 - 4.0 g/dL A-G Ratio 0.7 (L) 1.1 - 2.2 MAGNESIUM Collection Time: 10/10/18  4:18 AM  
Result Value Ref Range Magnesium 2.2 1.6 - 2.4 mg/dL PHOSPHORUS Collection Time: 10/10/18  4:18 AM  
Result Value Ref Range Phosphorus 2.9 2.6 - 4.7 MG/DL  
GLUCOSE, POC Collection Time: 10/10/18 12:00 PM  
Result Value Ref Range Glucose (POC) 216 (H) 65 - 100 mg/dL Performed by [facility] Reviewed:  
 
[facility] Result Date: 10/10/2018 IMPRESSION: Unchanged bilateral pleural effusions with bibasilar atelectasis/airspace disease. Assessment:  
 
Principal Problem: 
  Acute respiratory failure with hypoxemia ([facility] (10/6/2018) Active Problems: 
  Acute systolic heart failure ([facility] [address] (4/4/2014) Type 2 diabetes mellitus with diabetic neuropathy, without long-term current use of insulin ([facility] [address] (6/5/2016) Atrial fibrillation with rapid ventricular response ([facility] [address] (10/7/2018) Hypokalemia (10/7/2018) Plan:  
 
The objective of todays visit was to review the transitional care plan with the patient. Patient is admitted to ICU. Deferred conversation till spouse is present to further discuss ACP. Signed By: Zuly Briones NP October 10, 2018

## 2018-10-10 NOTE — PROGRESS NOTES
Problem: Pressure Injury - Risk of 
Goal: *Prevention of pressure injury Document Preet Scale and appropriate interventions in the flowsheet. Outcome: Progressing Towards Goal 
Pressure Injury Interventions: 
Sensory Interventions: Assess changes in LOC, Assess need for specialty bed, Avoid rigorous massage over bony prominences, Check visual cues for pain, Float heels, Discuss PT/OT consult with provider, Keep linens dry and wrinkle-free, Maintain/enhance activity level, Monitor skin under medical devices, Minimize linen layers, Pad between skin to skin, Pressure redistribution bed/mattress (bed type), Turn and reposition approx. every two hours (pillows and wedges if needed) Moisture Interventions: Absorbent underpads, Apply protective barrier, creams and emollients, Assess need for specialty bed, Check for incontinence Q2 hours and as needed, Internal/External urinary devices, Maintain skin hydration (lotion/cream), Minimize layers, Moisture barrier Activity Interventions: Assess need for specialty bed, Pressure redistribution bed/mattress(bed type), PT/OT evaluation Mobility Interventions: Assess need for specialty bed, Float heels, HOB 30 degrees or less, Pressure redistribution bed/mattress (bed type), PT/OT evaluation, Turn and reposition approx. every two hours(pillow and wedges) Nutrition Interventions: Discuss nutritional consult with provider Friction and Shear Interventions: Apply protective barrier, creams and emollients, Foam dressings/transparent film/skin sealants, HOB 30 degrees or less, Lift sheet, Lift team/patient mobility team, Minimize layers, Transferring/repositioning devices Problem: Falls - Risk of 
Goal: *Absence of Falls Document Tee Sheth Fall Risk and appropriate interventions in the flowsheet.   
Outcome: Progressing Towards Goal 
Fall Risk Interventions: 
  
 
Mentation Interventions: Adequate sleep, hydration, pain control, Bed/chair exit alarm, Door open when patient unattended, Evaluate medications/consider consulting pharmacy, More frequent rounding, Reorient patient, Room close to nurse's station, Toileting rounds, Update white board Medication Interventions: Assess postural VS orthostatic hypotension, Evaluate medications/consider consulting pharmacy, Teach patient to arise slowly Elimination Interventions: Bed/chair exit alarm, Call light in reach, Patient to call for help with toileting needs, Toileting schedule/hourly rounds

## 2018-10-10 NOTE — PROGRESS NOTES
[facility], Critical Care, and Sleep Medicine Name: Chance Philippe MRN: [id number] : 1950 Hospital: [place] Date: 10/10/2018 IMPRESSION:  
· Acute hypoxic respiratory failure extubated on 10/9/18. Was on bipap, now off. · Acute laryngeal edema, stridor post extubation. 10/9/18 now improving. · Acute pulmonary edema, not much change. Oxygen on 2L NC. · Acute/chronic systolic/diastolic heart failure - LVEF 15% · Ischemic cardiomyopathy · SSS with pacemaker · Chronic afib, now in RVR, for increased metoprolol. Discussed with Dr. Ramin Powell this am.  
· DM-monitor glucose. Assess her po intake. · CKD · SHAYLA · Discussed with nurse this am.   
  
PLAN:  
· Will continue steroids, lower dose. · Off bipap. · Start clear liquids. · Sedation with precedex now off. · Diuretics · Amiodarone drip · Ideally would add back her home metoprolol, add as tolerated. Will need to monitor for decompensation of heart failure. · Insulin/glycemic monitoring · Monitor creatinine · DVT/GI prophylaxis Subjective/Interval History:  
I have reviewed the flowsheet and previous days notes. The patient is unable to give any meaningful history or review of systems because the patient is: Pt was assess earlier this am. Was able to pass SAT, SBT, then upon extubation developed stridor. No acute issues noted last pm. Has been with increased Heart rate and increased BP. When seen this am she is very sleepy, no distress. Events from yesterday last pm noted. Not able to obtain ROS or HPI from pt. The patient is critically ill on: Mechanical ventilation Review of Systems Unable to perform ROS: Intubated Objective: 
Vital Signs:   
Visit Vitals  /89  Pulse (!) 103  Temp 97.9 °F (36.6 °C)  Resp 15  Ht 5' 9\" (1.753 m)  Wt 103.7 kg (228 lb 9.9 oz)  SpO2 96%  Breastfeeding No  
  BMI 33.76 kg/m2 O2 Device: Nasal cannula O2 Flow Rate (L/min): 2 l/min Temp (24hrs), Av.4 °F (36.9 °C), Min:97.9 °F (36.6 °C), Max:99 °F (37.2 °C) Intake/Output:  
Last shift:        
Last 3 shifts: 10/08 1901 - 10/10 0700 In: 1719.3 [P.O.:50; I.V.:1359.3] Out: 3415 [GMIE] Intake/Output Summary (Last 24 hours) at 10/10/18 0730 Last data filed at 10/10/18 0700 Gross per 24 hour Intake           634.84 ml Output             2785 ml Net         -2150.16 ml Hemodynamics:  
PAP:   CO:    
Wedge:   CI:    
CVP:    SVR:    
  PVR:    
 
Ventilator Settings: 
Mode Rate Tidal Volume Pressure FiO2 PEEP Assist control   450 ml    50 % 6 cm H20 Peak airway pressure: 25 cm H2O Minute ventilation: 9 l/min Physical Exam  
Constitutional: She appears ill. She is sedated. Prior to extubation pt appeared comfortable. Upon extubation quickly developed stridor. She was placed on bipap and appeared to be comfortable. HENT:  
Head: Normocephalic and atraumatic. Mouth/Throat: No oropharyngeal exudate. Mike not seem to have as much stridor this am.  
NO increased work of breathing. Eyes: No scleral icterus. Cardiovascular: An irregularly irregular rhythm present. Tachycardia present. No murmur heard. Pulmonary/Chest: She has no wheezes. She has rales. Abdominal: Soft. Bowel sounds are normal. She exhibits no distension. There is no tenderness. Musculoskeletal: She exhibits edema. Skin: Skin is warm and dry. No rash noted. Psychiatric:  
Sleepy not able to fully assess. Data:  
 
Current Facility-Administered Medications Medication Dose Route Frequency  potassium chloride 10 mEq in 50 ml IVPB  10 mEq IntraVENous Q1H  
 dexamethasone (DECADRON) 4 mg/mL injection 6 mg  6 mg IntraVENous Q8H  
 dexmedeTOMidine (PRECEDEX) 400 mcg in 0.9% sodium chloride 100 mL infusion  0.2-1.4 mcg/kg/hr IntraVENous TITRATE  PHENYLephrine (EZRA-SYNEPHRINE) 30 mg in 0.9% sodium chloride 250 mL infusion   mcg/min IntraVENous TITRATE  enoxaparin (LOVENOX) injection 100 mg  100 mg SubCUTAneous Q12H  
 magnesium oxide (MAG-OX) tablet 400 mg  400 mg Oral DAILY  famotidine (PF) (PEPCID) 20 mg in sodium chloride 0.9% 10 mL injection  20 mg IntraVENous Q24H  
 venlafaxine (EFFEXOR) tablet 150 mg  150 mg Oral DAILY  pregabalin (LYRICA) capsule 200 mg  200 mg Oral BID  aspirin chewable tablet 81 mg  81 mg Oral DAILY  atorvastatin (LIPITOR) tablet 40 mg  40 mg Oral QHS  sodium chloride (NS) flush 5-10 mL  5-10 mL IntraVENous Q8H  propofol (DIPRIVAN) infusion  0-50 mcg/kg/min IntraVENous TITRATE  amiodarone (CORDARONE) 375 mg/250 mL D5W infusion  0.5 mg/min IntraVENous TITRATE  bumetanide (BUMEX) injection 2 mg  2 mg IntraVENous Q12H  mupirocin (BACTROBAN) 2 % ointment   Both Nostrils BID  chlorhexidine (PERIDEX) 0.12 % mouthwash 15 mL  15 mL Oral BID Labs: 
Recent Labs 10/10/18 
 0092  10/09/18 
 0249  10/08/18 
 8571 WBC  7.2  9.1  12.3* HGB  11.9  10.4*  11.5 HCT  37.4  32.9*  36.9 PLT  163  165  175 Recent Labs 10/10/18 
 6879  10/09/18 
 1351  10/09/18 
 0249  10/08/18 
 0432 NA  143  144  144  144  
K  3.2*  3.4*  2.9*  3.5 CL  107  108  108  108 CO2  30  26  31  28 GLU  175*  176*  132*  126* BUN  23*  24*  25*  19  
CREA  1.28*  1.47*  1.53*  1.57* CA  8.3*  8.2*  7.6*  7.8*  
MG  2.2  2.2  2.2  2.3 PHOS  2.9   --   3.3  2.7 ALB  2.8*  2.8*  2.6*  2.8* TBILI  0.8  0.7  0.6  1.0  
SGOT  13*  13*  16  20 ALT  14  15  13  15 Recent Labs 10/09/18 
 1030  10/08/18 
 5159 PH  7.49*  7.49* PCO2  37  36 PO2  70*  86 HCO3  27*  27* FIO2  40  40 Imaging: 
I have personally reviewed the patients radiographs and have reviewed the reports: 
Pulmonary edema, decreased since intubation 10-8-18: FINDINGS:  
 A portable AP radiograph of the chest was obtained at 0434 hours. There is a 
pacemaker in the left chest with leads overlying the right atrium and ventricle. Lines and tubes: The patient is on a cardiac monitor. The endotracheal tube, 
nasogastric tube and right jugular triple-lumen catheter are unchanged. Lungs: The lungs are clear. Pleura: There are pleural effusions which are unchanged. Mediastinum: The cardiac and mediastinal contours and pulmonary vascularity are 
normal. 
Bones and soft tissues: The bones and soft tissues are grossly within normal 
limits. 
  
 
IMPRESSION: No significant change.  
 
 
Lulu Romero MD

## 2018-10-10 NOTE — PROGRESS NOTES
[address] 
[phone] Medical Progress Note NAME: Aníbal Toro :  1950 MRM:  [id number] Date/Time: 10/10/2018  5:48 AM 
  
Subjective:  
 
Admitted with acute respiratory failure with hypoxemia due to systolic CHF associated with atrial fib with RVR. Bedside EF 15-20%. Chest xray showed moderate CHF. Respiratory failure rapidly progressed with subsequent intubation. Patient on pressors for short period of time. Afib controlled with amiodarone with exacerbations due to hypokalemia related to diuresis. Successfully extubated  complicated by stridor. Patient currently awake, responds to questions with some confusion. Currently in afib at rate around 100. O2 sat's adequate on nasal O2. Past Medical History:  
Diagnosis Date  Anxiety 2018  Arthritis OA  Asthma  Diabetes ([facility] [address]  Essential hypertension  GERD (gastroesophageal reflux disease)  Hypercholesterolemia 2018  Hypertension  Long-term use of high-risk medication 2018  Neuropathy  Other ill-defined conditions(799.89) IBS, spinal stenosis  Plantar fasciitis  Psychiatric disorder   
 depression, anxiety  Sleep apnea   
 uses CPAP  Type 2 diabetes mellitus with diabetic neuropathy, without long-term current use of insulin ([facility] [address] 2016 ROS:  General: postive for weakness Respiratory:  positive for cough, congestion Cardiology:  negative for chest pain, palpitations, 
Gastrointestinal: negative for abdominal pain, N/V Muskuloskeletal : negative for arthralgia, myalgia Neurological: negative for headache, dizziness or focal deficits Psychological: positive for some anxiety, confusion Review of systems otherwise negative Objective:  
 
 
Vitals:  
 
  
Last 24hrs VS reviewed since prior progress note. Most recent are: 
 
Visit Vitals  BP (!) 143/97  Pulse (!) 113  Temp 97.9 °F (36.6 °C)  Resp 12  Ht 5' 9\" (1.753 m)  Wt 228 lb 9.9 oz (103.7 kg)  SpO2 96%  Breastfeeding No  
 BMI 33.76 kg/m2 SpO2 Readings from Last 6 Encounters:  
10/10/18 96% 09/10/18 96% 08/07/18 94% 08/01/18 96% 03/14/18 96% 02/22/18 97% O2 Flow Rate (L/min): 2 l/min Intake/Output Summary (Last 24 hours) at 10/10/18 1343 Last data filed at 10/10/18 0500 Gross per 24 hour Intake           743.78 ml Output             2760 ml Net         -2016.22 ml Telemetry reviewed:   afib Tubes:   Vargas, central line X-Ray:  Admission chest xray - moderated CHF Procedures:   Echo - Left ventricle: Ejection fraction was estimated in the range of 15 % to 20 
%. There was diffuse hypokinesis. Mitral valve: There was mild regurgitation. Exam:  
 
General   well developed, well nourished, appears stated age in no respiratory distress Neck   Supple without nodes or mass. No thyromegaly or bruit Respiratory   Anterior rhonchi present Cardiology  RRR without murmur Abdominal  Soft, non-tender, non-distended, positive bowel sounds, no hepatosplenomegaly Extremities  Without LE edema Skin  Normal skin turgor. No rashes or skin ulcers noted Neurological No focal deficit noted Psychological  Alert, oriented x1 Exam otherwise negative Lab Data Reviewed: (see below) Medications Reviewed: (see below) 
 
______________________________________________________________________ Medications:  
 
Current Facility-Administered Medications Medication Dose Route Frequency  potassium chloride 10 mEq in 50 ml IVPB  10 mEq IntraVENous Q1H  
 metoprolol (LOPRESSOR) injection 5 mg  5 mg IntraVENous Q4H PRN  
 dexamethasone (DECADRON) 4 mg/mL injection 6 mg  6 mg IntraVENous Q8H  
 dexmedeTOMidine (PRECEDEX) 400 mcg in 0.9% sodium chloride 100 mL infusion  0.2-1.4 mcg/kg/hr IntraVENous TITRATE  PHENYLephrine (EZRA-SYNEPHRINE) 30 mg in 0.9% sodium chloride 250 mL infusion   mcg/min IntraVENous TITRATE  enoxaparin (LOVENOX) injection 100 mg  100 mg SubCUTAneous Q12H  
 magnesium oxide (MAG-OX) tablet 400 mg  400 mg Oral DAILY  famotidine (PF) (PEPCID) 20 mg in sodium chloride 0.9% 10 mL injection  20 mg IntraVENous Q24H  
 venlafaxine (EFFEXOR) tablet 150 mg  150 mg Oral DAILY  fentaNYL citrate (PF) injection  mcg   mcg IntraVENous Q1H PRN  pregabalin (LYRICA) capsule 200 mg  200 mg Oral BID  aspirin chewable tablet 81 mg  81 mg Oral DAILY  atorvastatin (LIPITOR) tablet 40 mg  40 mg Oral QHS  clonazePAM (KlonoPIN) tablet 0.5 mg  0.5 mg Oral DAILY PRN  
 sodium chloride (NS) flush 5-10 mL  5-10 mL IntraVENous Q8H  
 sodium chloride (NS) flush 5-10 mL  5-10 mL IntraVENous PRN  
 acetaminophen (TYLENOL) tablet 650 mg  650 mg Oral Q6H PRN  
 docusate sodium (COLACE) capsule 100 mg  100 mg Oral DAILY PRN  propofol (DIPRIVAN) infusion  0-50 mcg/kg/min IntraVENous TITRATE  amiodarone (CORDARONE) 375 mg/250 mL D5W infusion  0.5 mg/min IntraVENous TITRATE  prochlorperazine (COMPAZINE) with saline injection 5 mg  5 mg IntraVENous Q6H PRN  
 bumetanide (BUMEX) injection 2 mg  2 mg IntraVENous Q12H  mupirocin (BACTROBAN) 2 % ointment   Both Nostrils BID  chlorhexidine (PERIDEX) 0.12 % mouthwash 15 mL  15 mL Oral BID Lab Review:  
 
Recent Labs 10/10/18 
 8327  10/09/18 
 0249  10/08/18 
 6161 WBC  7.2  9.1  12.3* HGB  11.9  10.4*  11.5 HCT  37.4  32.9*  36.9 PLT  163  165  175 Recent Labs 10/10/18 
 5642  10/09/18 
 1351  10/09/18 
 0249  10/08/18 
 8150 NA  143  144  144  144  
K  3.2*  3.4*  2.9*  3.5 CL  107  108  108  108 CO2  30  26  31  28 GLU  175*  176*  132*  126* BUN  23*  24*  25*  19  
CREA  1.28*  1.47*  1.53*  1.57* CA  8.3*  8.2*  7.6*  7.8*  
MG  2.2  2.2  2.2  2.3 PHOS  2.9   --   3.3  2.7 ALB  2.8*  2.8*  2.6*  2.8* TBILI  0.8  0.7  0.6  1.0  
SGOT  13*  13*  16  20 ALT  14  15  13  15 Lab Results Component Value Date/Time Glucose (POC) 97 01/20/2017 07:23 AM  
 Glucose (POC) 127 (H) 01/19/2017 11:03 PM  
 Glucose (POC) 122 (H) 01/19/2017 05:34 PM  
 Glucose (POC) 109 (H) 01/19/2017 04:40 PM  
 Glucose (POC) 102 (H) 01/19/2017 09:53 AM  
 
Recent Labs 10/09/18 
 1030  10/08/18 
 0640 PH  7.49*  7.49* PCO2  37  36 PO2  70*  86 HCO3  27*  27* FIO2  40  40 No results for input(s): INR in the last 72 hours. No lab exists for component: INREXT, INREXT Assessment:  
 
Principal Problem: 
  Acute respiratory failure with hypoxemia ([facility] [address] (10/6/2018) Active Problems: 
  Type 2 diabetes mellitus with diabetic neuropathy, without long-term current use of insulin ([facility] [address] (6/5/2016) Acute systolic heart failure ([address] (4/4/2014) Atrial fibrillation with rapid ventricular response ([address] (10/7/2018) Hypokalemia (10/7/2018) Plan:  
 
Risk of deterioration: high 1. Continue vent/respiratory support 2. Continue IV bumex, amiodarone 3. Continue aggressive K+ replacement - K+ 3.2 today and 4 runs KCL IV ordered 4. Cardiology following 5. Follow labs , Sat's Care Plan discussed with: Nursing Staff Discussed:  Care Plan Prophylaxis:  Lovenox and SCD's Disposition:  Unknown 
        
___________________________________________________ Attending Physician: Catia Armendariz MD

## 2018-10-10 NOTE — PROGRESS NOTES
0700: Received bedside and verbal shift report from Antoine Garcia RN. 
 
0800: Assessment complete, see flowsheets for details; afebrile, vitals stable, a-fib (rate controlled), BP stable, no complaints of pain/discomfort. 1200: Assessment complete, no changes noted; afebrile, vitals stable, no complaints of pain/discomfort. 1600: Assessment complete, see flowsheets for details, afebrile, vitals stable, upper lungs clear, lower diminished, on room air, no complaints of pain/discomfort; buckner removed and purewick now in place. 1900: Gave bedside and verbal shift report to Acosta Koch RN.

## 2018-10-10 NOTE — PROGRESS NOTES
Cardiology Progress Note 10/10/2018    Admit Date: 10/6/2018 Admit Diagnosis: Acute respiratory failure (HCC)  CC:  None currently Cardiac Assessment/Plan:  
Ms Miko Bonilla has a h/o: 
1) CAD (LAD ISELA 2014 for NQMI), Neg PET for ischemia 9/2018. 
2) mixed ischemic/tachycardia mediated CM 4/2014 (EF 20%); EF 45% 11/2017. German Sickle was too expensive. 3) HTN, 4) PAfib (RFA 4/2016 and 1/2017), Recurrrent/persistent afib 2017/2018 5) DM,  
6) SHAYLA (on CPAP),  
7) CKD (Cr 1.5 range). Aldactone stopped in 2014. 8) St Don PPM 7/2014 for bradycardia while on therapy for AFib. CAD: LAD ISELA 2014; recent PET w/o ischemia but recurrent low EF: if not improved with med Rx, may need repeat cath. CM: Echo 10/9/18: EF 15-20%. If cant diurese well, may need milrinone. Eventually will need to get back on ACEi. Good UOP so far with IV diuretics. HTN: off metoprolol/lisinopril with low BPs after ETT/sedation: Levophed off since evening of 10/7; change to prn Jose: restart BBlocker: iv prn then PO as able. Rhythm: PAFib; recurrent; remains on IV amio; Dr uAtumn Echeverria to see (considering AV node ablation/BiV PM or ICD). Cont anticoagulation. Renal function: Cr stable in 1.5 range; (Cr 1.3 range typically). Resp failure, SHAYLA, DM, Dispo: per priamry team.  
For other plans, see orders. 10/10:  Remains in AFib. Extubated. Stridor resolved. Labs stable. Start metoprolol 25mg BID; eventual change to Toprol XL. Eventual resumption of ACEi/ARB. Continue amio; bolus this am.  Per Dr. Leyva Primo.io \"She'll need optimization of her heart failure status in the shortterm, not in good condition to undergo what I'd recommend. I think she'll need a BIV-ICD upgrade and AV node ablation ultimately for definitive management of her cardiomyopathy, heart failure, and tachycardia problem. \" 
 
@ OV 10/2/18: 
Ms Miko Bonilla has a h/o: 
1) CAD (LAD ISELA 2014 for NQMI), Neg PET for ischemia 9/2018. 2) mixed ischemic/tachycardia mediated CM 4/2014 (EF 20%); EF 45% 11/2017. Bimal Vazquez was too expensive. 3) HTN, 4) PAfib (RFA 4/2016 and 1/2017), Recurrrent/persistent afib 2017/2018 5) DM,  
6) SHAYLA (on CPAP),  
7) CKD (Cr 1.5 range). Aldactone stopped in 2014. 8) St Don PPM 7/2014 for bradycardia while on therapy for AFib. 
 
11/2017: No CP/STEEN; Back in afib today:  Coreg changed Toprol-XL. 10/2018:  Recent ER visit with epigastric pain; negative evaluation there & seen by Dr. Kenny Tellez who set up a cardiac PET. The PET shows no ischemia but EF suggested at 16%. Of note patient does have AFib with accelerated ventricular response. No recurrence of epigastric discomfort. No bleeding. Had a simple trip at home a few weeks ago without injury. IMPRESSION AND PLAN 
  
01. (I25.10) Atherosclerotic heart disease of native coronary artery without angina pectoris:  No anginal symptoms. Cont asa and stopped plavix with need for anticoagulation. We discussed the signs and symptoms of unstable angina, myocardial infarction and malignant arrhythmia. The patient knows to seek immediate medical attention should they occur. ECG done today 02. (I42.0) Dilated cardiomyopathy:  Mixed ischemic/non-ischemic (tachycardia mediated) CM. Her ejection fraction is greater than 35% after repeat echo. The patient's systolic function has been stable. I suspect the PET has a falsely low EF related to her AFib but repeat echo is needed. 2-D w/CFD Echo to be done first available. 03. (I50.42) Chronic combined systolic (congestive) and diastolic (congestive) heart failure:  Resolved exertional symptoms. (NYHA I)  The patient is compliant with their CHF regimen. 04. (I48.1) Persistent atrial fibrillation:  Management per Dr Judie Ganser; currently off amio and on Pradaxa. Heart rate has been to elevated; increase Toprol  q.day. Further AFib monitoring/therapy per Dr. Dominique Escalante. 05. (I13.0) Hyp hrt & chr kdny dis w hrt fail and stg 1-4/unsp chr kdny:  Adequately controlled. We will see how the patient does with the med changes noted above. 06. (E78.2) Mixed hyperlipidemia:  Lipid labs drawn by PCP. The patient is tolerating lipid lowering therapy well. 07. (N18.3) Chronic kidney disease, stage 3 (moderate):  Cr 1.24/GFR46 11/2015. Cr 1.32/GFR 43 1/2017.  
08. (R60.0) Localized edema:  Resolved. She avoids salt. 09. (E11.69) Type 2 diabetes mellitus with other specified complication:  Lipids & HTN as noted above; DM managed by other MD.  
10. (Z95.0) Presence of cardiac pacemaker:  will continue to be followed in device clinic. 11. (Z79.82) Long term (current) use of aspirin:  This condition is stable. 12. (Z79.01) Long term (current) use of anticoagulants: This condition is stable. Indicated for atrial fibrillation. No bleeding. If she has recurrent falls, she knows to call for re-evaluation of anticoagulation. 13. (Z68.38) Body mass index (BMI) 38.0-38.9, adult:  The patient was instructed on AHA diet and regular exercise. ORDERS: 
    
1 ECG done today 2 2-D w/ CFD Echocardiogram first available. 3 Return office visit with Holden Little MD in 6 Months. 4 The patient was instructed on AHA diet and regular exercise. 10/2/18 MEDICATION LIST Medication Sig Description  
amlodipine 5 mg tablet take 1 tablet by oral route  every day per pc  
aspirin 81 mg tablet,delayed release take 1 tablet by oral route  every day  
atorvastatin 40 mg tablet take 1 tablet by oral route  every evening  
bumetanide 1 mg tablet 1 in am on tues&thursday 2 tablets on mon wed fridays Calcium 600 600 mg (1,500 mg) tablet daily  
clonazepam 0.5 mg tablet take 1 tablet by oral route  every day LISINOPRIL 20 MG Tablet TAKE 1 TABLET EVERY DAY Lyrica 200 mg capsule take 1 capsule by oral route 2 times every day magnesium 250 mg tablet take 1 tablet by oral route  every day  
metoprolol succinate  mg tablet,extended release 24 hr take 1 tablet by oral route  every day  
potassium gluconate 500 mg (83 mg) tablet daily Pradaxa 150 mg capsule take 1 capsule by oral route 2 times every day Premarin 0.625 mg/gram vaginal cream insert 0.5 applicatorful by vaginal route  every day cyclically, 3 weeks on and 1 week off  
venlafaxine 75 mg tablet 2 po bid Vitamin D3 1,000 unit capsule take 1 daily  
 
____________________________________________________________________________________________________ CARDIAC HISTORY 
CAD: 
   
1 NSTEMI [95% Proximal LAD, Resolute (ISELA) to the Proximal LAD.] - 4/8/2014 CHF/CM: 
   
1 Mixed ischemic/tachycardic CM. [Nl TSH.] - 4/2014  
2 Ischemic & tachycardic Cardiomyopathy [Echo (EF 0.45) - 06/17/2014, (prev EF 20-30%)] - 6/2014 ARRHYTHMIA: 
   
1 A Fib [DCCV, Plan: amio started; Eliquis with Effient (change eliquis to asa if NSR after 30 days). ] - 4/8/2014  
2 PAF - 8/2010  
3 A Fib, recurrent; and 6/2014. [Had marked sinus bradycardia while on bblocker/dig.] - 5/2014  
4 Sinus Bradycardia. - 6/2/2014  
5 PPM (Devices (Dual Chamber PPM (Khoi Hood)) - 7/17/2014) - 7/17/2014  
6 PAF [CVR; Eliquis restarted (plavix stopped). ] - 9/2015  
7 A Fib [RFA] - 4/2016  
8 A Fib [RFA] - 1/2017 RISK FACTORS: 
   
1 Diabetes [Diagnosed in 2014] 2 Hypertension 3 Dyslipidemia CARDIOVASCULAR PROCEDURES Procedure Date Results Cardiac PET 09/24/2018 EF 0.16 (16%), physiologic apical thinning with no ischemia Cath 04/08/2014 95% Proximal LAD, Resolute (ISELA) to the Proximal LAD. DCCV 04/08/2014 Initial Rhythm A Fib, Final Rhythm Sinus Devices 07/17/2014 Dual Chamber PPM (Khoi Morataya) Echo 11/28/2017 EF 0.45 (45%), Mild Global Hypo, Mild TR, Mild LVH, Mild MR, Est RVSP 41 mmHg, Normal RV. (probable trileaflet AoV). Echo 10/30/2015 EF 0.45 (45%), Mild LVH, LA Diam 4.1 cm, Est RVSP 35 mmHg, Normal RV. ?bicuspid AoV; (Prob trileaflet on previous ANGELITO). Echo 2014 EF 0.45 (45%), EF range 45%-50%, Mild LV dilatation. Mild LV systolic dysfunction. ? Bicuspid AoV but prob trileafet on previous ANGELITO. Echo 2014 EF 0.20 (20%), global HK with lateral sparing; no valve disease. EKG 10/02/2018 Atrial Fib, ; nonspecific T-wave flattening. Holter 2014 No Malignant Arrhythmias, Atrial Fib, No correlation with symptoms, (, ave 81.) Holter 2014 No Malignant Arrhythmias, Atrial Fib, No correlation with symptoms, \"light or heavy chest\" = afib in 60s. HR  (ave 63). ASx pauses (upto 2.8) while asleep. RFA 2017 Indication A Fib, Final Rhythm Sinus RFA  Indication A Fib Sleep Study  OSAS: Moderate ANGELITO 2014 EF 0.35 (35%), No BRAYDON Clot, prob trileaflet AoV. ACTIVE ALLERGIES: 
Ingredient Reaction Comment SACUBITRIL Hulon Lilia is too expensive SULFA (SULFONAMIDE ANTIBIOTICS) AMOXICILLIN TRIHYDRATE    
POTASSIUM CLAVULANATE ACTIVE INTOLERANCES: 
Description Reaction Comment SACUBITRIL Hulon Lilia is too expensive PROBLEM LIST: 
Problem Description Chronic Benign essential HTN Y  
DM type 2 N A-fib Y  
CAD Y Mixed hyperlipidemia Y  
 
 
PAST MEDICAL/SURGICAL HISTORY  (Detailed) Disease/disorder Onset Date Management Date Comments  
  hysterectomy A-FIB Cardiovascular disease CHF Hyperlipidemia Hypertension SHAYLA: moderate. 2014 Family History  (Detailed) Relationship Family Member Name  Age at Death Condition Onset Age Cause of Death Brother    Hypertension  N Mother    PAD  N Mother    Hypertension  N Mother    Cardiac arrhythmias  N Sister    Diabetes mellitus  N  
 
SOCIAL HISTORY  (Detailed) Preferred language is [place].    
EDUCATION/EMPLOYMENT/OCCUPATION 
 Employment History Status Retired Restrictions  
  retired    
  retired MARITAL STATUS/FAMILY/SOCIAL SUPPORT Currently . High complexity decision making was performed  X Yes High-risk of decompensation with multiple organ involvement X Yes Hospital problem list  
Active Hospital Problems Diagnosis Date Noted  Atrial fibrillation with rapid ventricular response ([facility] 10/07/2018  Hypokalemia 10/07/2018  [facility] 10/06/2018  Type 2 diabetes mellitus with diabetic neuropathy, without long-term current use of insulin ([facility] 2016  Acute systolic heart failure ([facility] 2014 Subjective:  Patient reports  []   nothing; unable to communicate    [x]  just intubated Chest pain x none  consistent with:  Non-cardiac CP Atypical CP None now  On going  Anginal CP Dyspnea  none  at rest  with exertion    
   x improved  unchanged  worse PND x none  overnight Orthopnea x none  improved  unchanged  worse Presyncope x none  improved  unchanged  worse Ambulated in hallway without symptoms   Yes Ambulated in room without symptoms  Yes ROS Hematuria:  Yes X No Dysuria:  Yes X No  
            
(2+  other systems) Cough: Yes X No Sputum: Yes X No  
            
 BRBPR:  Yes X No Melena: Yes X No  
No change in family and social history from H&P/Consult note. Objective:  
 Physical Exam: 
24 hr VS reviewed, overall VSSAF Temp (24hrs), Av.3 °F (36.8 °C), Min:97.8 °F (36.6 °C), Max:99 °F (37.2 °C) Patient Vitals for the past 8 hrs: 
 Pulse 10/10/18 0730 (!) 110  
10/10/18 0700 (!) 103  
10/10/18 0600 (!) 112  
10/10/18 0500 (!) 113  
10/10/18 0400 (!) 118  
10/10/18 0300 (!) 109  
10/10/18 0200 (!) 107  
10/10/18 0145 (!) 110  
 10/10/18 0100 (!) 110 Patient Vitals for the past 8 hrs: 
 Resp 10/10/18 0730 15  
10/10/18 0700 15  
10/10/18 0600 11  
10/10/18 0500 12  
10/10/18 0400 9  
10/10/18 0300 14  
10/10/18 0200 13  
10/10/18 0145 12  
10/10/18 0100 19 Patient Vitals for the past 8 hrs: 
 BP  
10/10/18 0700 167/89  
10/10/18 0600 (!) 159/106  
10/10/18 0500 (!) 143/97  
10/10/18 0400 137/83  
10/10/18 0300 135/72  
10/10/18 0200 (!) 136/93  
10/10/18 0145 158/81  
10/10/18 0100 (!) 160/106 Intake/Output Summary (Last 24 hours) at 10/10/18 [facility] Last data filed at 10/10/18 0730 Gross per 24 hour Intake           601.64 ml Output             2860 ml Net         -2258.36 ml General: x WD,WN; obese  Elderly  Cachetic x NAD Agitated  Lethargic  Arousable  Obtunded Sedated  On Bipap  Intubated ENT/Palate: X WNL  Dry MM  anicteric Respiratory: X Wheeze; ?stridor  Nl resp effort x Increased effort  No significant change  
 x rhonchi  rales  improved  worse Cardiovasc:  RRR x IRRR X Nl S1, S2 x No rub No murmur X No new murmur  Murmur c/w: x No gallop  
 x Min edema  BLE edema:+  RLE edema:+  LLE edema:+ Edema less  Edema more  Edema same  Edema worse X Nl JVP  Elevated JVP  JVP same  JVP worse X Carotid wnl x abd aorta not palpated X no peripheral emboli noted GI: X abd soft x nondistended X BS present X No organo- megaly noted Skin: X Warm, dry  Cold extremites Neuro: x A/seems O  Grossly non- focal  Obtunded  Sedated Lethargic  Arousable  intubated    
 
cath site intact w/o hematoma or new bruit; distal pulse unchanged  Yes Data Review:    
Telemetry independently reviewed  sinus  chronic afib x parox afib  NSVT  
 
ECG independently reviewed  NSR  afib  no significant changes  NSST-Tw chgs  
x no new ECG provided for review Lab results reviewed as noted below.   Current medications reviewed as noted below. Recent Labs 10/09/18 
 1030  10/08/18 
 9716 PH  7.49*  7.49* PCO2  37  36 PO2  70*  86 No results for input(s): CPK, CKMB, CKNDX, TROIQ in the last 72 hours. Recent Labs 10/10/18 
 1009  10/09/18 
 1351  10/09/18 
 0249  10/08/18 
 3949 NA  143  144  144  144  
K  3.2*  3.4*  2.9*  3.5 CL  107  108  108  108 CO2  30  26  31  28 BUN  23*  24*  25*  19  
CREA  1.28*  1.47*  1.53*  1.57* GFRAA  51*  43*  41*  40* GLU  175*  176*  132*  126* PHOS  2.9   --   3.3  2.7 CA  8.3*  8.2*  7.6*  7.8* ALB  2.8*  2.8*  2.6*  2.8* WBC  7.2   --   9.1  12.3* HGB  11.9   --   10.4*  11.5 HCT  37.4   --   32.9*  36.9 PLT  163   --   165  175 Recent Labs 10/10/18 
 0418  10/09/18 
 1351  10/09/18 
 0249 SGOT  13*  13*  16  
AP  103  105  92 TBILI  0.8  0.7  0.6 TP  6.9  6.5  5.8* ALB  2.8*  2.8*  2.6*  
GLOB  4.1*  3.7  3.2 No results for input(s): INR, PTP, APTT in the last 72 hours. No lab exists for component: INREXT, INREXT No results for input(s): FE, TIBC, PSAT, FERR in the last 72 hours. Lab Results Component Value Date/Time Glucose (POC) 97 01/20/2017 07:23 AM  
 Glucose (POC) 127 (H) 01/19/2017 11:03 PM  
 Glucose (POC) 122 (H) 01/19/2017 05:34 PM  
 Glucose (POC) 109 (H) 01/19/2017 04:40 PM  
 Glucose (POC) 102 (H) 01/19/2017 09:53 AM  
 
 
Current Facility-Administered Medications Medication Dose Route Frequency  potassium chloride 10 mEq in 50 ml IVPB  10 mEq IntraVENous Q1H  
 metoprolol (LOPRESSOR) injection 5 mg  5 mg IntraVENous Q4H PRN  
 dexamethasone (DECADRON) 4 mg/mL injection 6 mg  6 mg IntraVENous Q8H  
 dexmedeTOMidine (PRECEDEX) 400 mcg in 0.9% sodium chloride 100 mL infusion  0.2-1.4 mcg/kg/hr IntraVENous TITRATE  PHENYLephrine (EZRA-SYNEPHRINE) 30 mg in 0.9% sodium chloride 250 mL infusion   mcg/min IntraVENous TITRATE  enoxaparin (LOVENOX) injection 100 mg  100 mg SubCUTAneous Q12H  magnesium oxide (MAG-OX) tablet 400 mg  400 mg Oral DAILY  famotidine (PF) (PEPCID) 20 mg in sodium chloride 0.9% 10 mL injection  20 mg IntraVENous Q24H  
 venlafaxine (EFFEXOR) tablet 150 mg  150 mg Oral DAILY  fentaNYL citrate (PF) injection  mcg   mcg IntraVENous Q1H PRN  pregabalin (LYRICA) capsule 200 mg  200 mg Oral BID  aspirin chewable tablet 81 mg  81 mg Oral DAILY  atorvastatin (LIPITOR) tablet 40 mg  40 mg Oral QHS  clonazePAM (KlonoPIN) tablet 0.5 mg  0.5 mg Oral DAILY PRN  
 sodium chloride (NS) flush 5-10 mL  5-10 mL IntraVENous Q8H  
 sodium chloride (NS) flush 5-10 mL  5-10 mL IntraVENous PRN  
 acetaminophen (TYLENOL) tablet 650 mg  650 mg Oral Q6H PRN  
 docusate sodium (COLACE) capsule 100 mg  100 mg Oral DAILY PRN  propofol (DIPRIVAN) infusion  0-50 mcg/kg/min IntraVENous TITRATE  amiodarone (CORDARONE) 375 mg/250 mL D5W infusion  0.5 mg/min IntraVENous TITRATE  prochlorperazine (COMPAZINE) with saline injection 5 mg  5 mg IntraVENous Q6H PRN  
 bumetanide (BUMEX) injection 2 mg  2 mg IntraVENous Q12H  mupirocin (BACTROBAN) 2 % ointment   Both Nostrils BID  chlorhexidine (PERIDEX) 0.12 % mouthwash 15 mL  15 mL Oral BID Vasiliy Serrato III, DO

## 2018-10-10 NOTE — PROGRESS NOTES
19:15 - Bedside and Verbal shift change report given to DIAN Rojas (oncoming nurse) by Baldomero Monsivais RN (offgoing nurse). Report included the following information SBAR, Kardex, Intake/Output, MAR, Recent Results and Cardiac Rhythm AFib.  
21:00 - Patient swallowed PO Lipitor with ice water through straw without difficulty. 00:00 - Re-assessment completed, no inspiratory wheezes heard. Patient is resting comfortably on 2 LPM NC. Her RR occasionally dips to around 7, then quickly back to baseline. O2 sats remain WNL. Patient is noted to have hx of SHAYLA and has a CPAP at home. Will monitor. 03:15 - Patient requesting sips of water. Mouth care performed as well. Patient states her throat is a little sore and that she has bronchitis. 04:00 - Patient moaning in her sleep. When asked why she was moaning, she only requested \"more air\". She denies pain at this time. VSS. Drifted back to sleep. 
05:15 - Spoke with MD regarding K of 3.2 this AM.  Orders written previously to replete if <3.2. Rec'd verbal order to go ahead and give the 4 runs of 10mEq KCl and he will see about adding PO supplements. 07:10 - Bedside and Verbal shift change report given to Baldomero Monsivais RN (oncoming nurse) by Lan Klinefelter, RN (offgoing nurse). Report included the following information SBAR, Kardex, MAR, Recent Results and Cardiac Rhythm AFib PVCs Paced.

## 2018-10-10 NOTE — DIABETES MGMT
DTC Progress Note Recommendations/ Comments: Pt discussed with rounding team and Dr. Dru Scherer, plan is to add lispro correction given hx of DM. Chart reviewed on Danie Kaye during Multidisciplinary Rounds. A1c:  
Lab Results Component Value Date/Time Hemoglobin A1c 5.0 08/01/2018 10:17 AM  
 
 
 
 
Recent Glucose Results:  
Lab Results Component Value Date/Time  (H) 10/10/2018 04:18 AM  
  (H) 10/09/2018 01:51 PM  
  
 
Lab Results Component Value Date/Time Creatinine 1.28 (H) 10/10/2018 04:18 AM  
 
Estimated Creatinine Clearance: 55.4 mL/min (based on Cr of 1.28). Active Orders Diet DIET DIABETIC CLEAR LIQUID  
  
 
PO intake: No data found. Will continue to follow as needed. Thank you. Mathieu Adler [facility] Office: [phone]

## 2018-10-11 ENCOUNTER — APPOINTMENT (OUTPATIENT)
Dept: GENERAL RADIOLOGY | Age: 68
DRG: 226 | End: 2018-10-11
Attending: INTERNAL MEDICINE
Payer: MEDICARE

## 2018-10-11 LAB
ALBUMIN SERPL-MCNC: 2.8 G/DL (ref 3.5–5)
ALBUMIN/GLOB SERPL: 0.9 {RATIO} (ref 1.1–2.2)
ALP SERPL-CCNC: 106 U/L (ref 45–117)
ALT SERPL-CCNC: 15 U/L (ref 12–78)
ANION GAP SERPL CALC-SCNC: 7 MMOL/L (ref 5–15)
AST SERPL-CCNC: 15 U/L (ref 15–37)
BASOPHILS # BLD: 0 K/UL (ref 0–0.1)
BASOPHILS NFR BLD: 0 % (ref 0–1)
BILIRUB SERPL-MCNC: 0.5 MG/DL (ref 0.2–1)
BUN SERPL-MCNC: 31 MG/DL (ref 6–20)
BUN/CREAT SERPL: 23 (ref 12–20)
CALCIUM SERPL-MCNC: 8.3 MG/DL (ref 8.5–10.1)
CHLORIDE SERPL-SCNC: 105 MMOL/L (ref 97–108)
CO2 SERPL-SCNC: 30 MMOL/L (ref 21–32)
CREAT SERPL-MCNC: 1.36 MG/DL (ref 0.55–1.02)
DIFFERENTIAL METHOD BLD: ABNORMAL
EOSINOPHIL # BLD: 0 K/UL (ref 0–0.4)
EOSINOPHIL NFR BLD: 0 % (ref 0–7)
ERYTHROCYTE [DISTWIDTH] IN BLOOD BY AUTOMATED COUNT: 18 % (ref 11.5–14.5)
GLOBULIN SER CALC-MCNC: 3 G/DL (ref 2–4)
GLUCOSE BLD STRIP.AUTO-MCNC: 122 MG/DL (ref 65–100)
GLUCOSE BLD STRIP.AUTO-MCNC: 142 MG/DL (ref 65–100)
GLUCOSE BLD STRIP.AUTO-MCNC: 158 MG/DL (ref 65–100)
GLUCOSE BLD STRIP.AUTO-MCNC: 213 MG/DL (ref 65–100)
GLUCOSE SERPL-MCNC: 165 MG/DL (ref 65–100)
HCT VFR BLD AUTO: 39.1 % (ref 35–47)
HGB BLD-MCNC: 12.2 G/DL (ref 11.5–16)
IMM GRANULOCYTES # BLD: 0.1 K/UL (ref 0–0.04)
IMM GRANULOCYTES NFR BLD AUTO: 1 % (ref 0–0.5)
LYMPHOCYTES # BLD: 1.4 K/UL (ref 0.8–3.5)
LYMPHOCYTES NFR BLD: 12 % (ref 12–49)
MAGNESIUM SERPL-MCNC: 2.2 MG/DL (ref 1.6–2.4)
MCH RBC QN AUTO: 28.4 PG (ref 26–34)
MCHC RBC AUTO-ENTMCNC: 31.2 G/DL (ref 30–36.5)
MCV RBC AUTO: 91.1 FL (ref 80–99)
MONOCYTES # BLD: 0.5 K/UL (ref 0–1)
MONOCYTES NFR BLD: 5 % (ref 5–13)
NEUTS SEG # BLD: 9.1 K/UL (ref 1.8–8)
NEUTS SEG NFR BLD: 82 % (ref 32–75)
NRBC # BLD: 0 K/UL (ref 0–0.01)
NRBC BLD-RTO: 0 PER 100 WBC
PHOSPHATE SERPL-MCNC: 3.5 MG/DL (ref 2.6–4.7)
PLATELET # BLD AUTO: 254 K/UL (ref 150–400)
PMV BLD AUTO: 12 FL (ref 8.9–12.9)
POTASSIUM SERPL-SCNC: 3.3 MMOL/L (ref 3.5–5.1)
PROT SERPL-MCNC: 5.8 G/DL (ref 6.4–8.2)
RBC # BLD AUTO: 4.29 M/UL (ref 3.8–5.2)
SERVICE CMNT-IMP: ABNORMAL
SODIUM SERPL-SCNC: 142 MMOL/L (ref 136–145)
WBC # BLD AUTO: 11.1 K/UL (ref 3.6–11)

## 2018-10-11 PROCEDURE — 82962 GLUCOSE BLOOD TEST: CPT

## 2018-10-11 PROCEDURE — G8988 SELF CARE GOAL STATUS: HCPCS | Performed by: OCCUPATIONAL THERAPIST

## 2018-10-11 PROCEDURE — 74011250636 HC RX REV CODE- 250/636: Performed by: INTERNAL MEDICINE

## 2018-10-11 PROCEDURE — 97166 OT EVAL MOD COMPLEX 45 MIN: CPT | Performed by: OCCUPATIONAL THERAPIST

## 2018-10-11 PROCEDURE — 51798 US URINE CAPACITY MEASURE: CPT

## 2018-10-11 PROCEDURE — 74011250637 HC RX REV CODE- 250/637: Performed by: INTERNAL MEDICINE

## 2018-10-11 PROCEDURE — G8979 MOBILITY GOAL STATUS: HCPCS

## 2018-10-11 PROCEDURE — G8978 MOBILITY CURRENT STATUS: HCPCS

## 2018-10-11 PROCEDURE — 74011636637 HC RX REV CODE- 636/637: Performed by: INTERNAL MEDICINE

## 2018-10-11 PROCEDURE — 85025 COMPLETE CBC W/AUTO DIFF WBC: CPT | Performed by: INTERNAL MEDICINE

## 2018-10-11 PROCEDURE — 36415 COLL VENOUS BLD VENIPUNCTURE: CPT | Performed by: INTERNAL MEDICINE

## 2018-10-11 PROCEDURE — 77030038269 HC DRN EXT URIN PURWCK BARD -A

## 2018-10-11 PROCEDURE — G8987 SELF CARE CURRENT STATUS: HCPCS | Performed by: OCCUPATIONAL THERAPIST

## 2018-10-11 PROCEDURE — 71045 X-RAY EXAM CHEST 1 VIEW: CPT

## 2018-10-11 PROCEDURE — 74011000250 HC RX REV CODE- 250: Performed by: INTERNAL MEDICINE

## 2018-10-11 PROCEDURE — 84100 ASSAY OF PHOSPHORUS: CPT | Performed by: INTERNAL MEDICINE

## 2018-10-11 PROCEDURE — 97535 SELF CARE MNGMENT TRAINING: CPT | Performed by: OCCUPATIONAL THERAPIST

## 2018-10-11 PROCEDURE — 97162 PT EVAL MOD COMPLEX 30 MIN: CPT

## 2018-10-11 PROCEDURE — 97116 GAIT TRAINING THERAPY: CPT

## 2018-10-11 PROCEDURE — 65660000000 HC RM CCU STEPDOWN

## 2018-10-11 PROCEDURE — 80053 COMPREHEN METABOLIC PANEL: CPT | Performed by: INTERNAL MEDICINE

## 2018-10-11 PROCEDURE — 83735 ASSAY OF MAGNESIUM: CPT | Performed by: INTERNAL MEDICINE

## 2018-10-11 PROCEDURE — 77010033678 HC OXYGEN DAILY

## 2018-10-11 RX ORDER — POTASSIUM CHLORIDE 1.5 G/1.77G
20 POWDER, FOR SOLUTION ORAL 2 TIMES DAILY WITH MEALS
Status: DISCONTINUED | OUTPATIENT
Start: 2018-10-11 | End: 2018-10-12

## 2018-10-11 RX ORDER — AMIODARONE HYDROCHLORIDE 200 MG/1
400 TABLET ORAL EVERY 8 HOURS
Status: DISCONTINUED | OUTPATIENT
Start: 2018-10-11 | End: 2018-10-14

## 2018-10-11 RX ORDER — DABIGATRAN ETEXILATE 150 MG/1
150 CAPSULE ORAL EVERY 12 HOURS
Status: COMPLETED | OUTPATIENT
Start: 2018-10-11 | End: 2018-10-12

## 2018-10-11 RX ADMIN — MUPIROCIN: 20 OINTMENT TOPICAL at 08:05

## 2018-10-11 RX ADMIN — Medication 10 ML: at 21:04

## 2018-10-11 RX ADMIN — DEXAMETHASONE SODIUM PHOSPHATE 4 MG: 4 INJECTION, SOLUTION INTRAMUSCULAR; INTRAVENOUS at 05:23

## 2018-10-11 RX ADMIN — Medication 10 ML: at 13:04

## 2018-10-11 RX ADMIN — BUMETANIDE 2 MG: 0.25 INJECTION INTRAMUSCULAR; INTRAVENOUS at 23:11

## 2018-10-11 RX ADMIN — Medication 10 ML: at 05:23

## 2018-10-11 RX ADMIN — AMIODARONE HYDROCHLORIDE 400 MG: 200 TABLET ORAL at 09:08

## 2018-10-11 RX ADMIN — CHLORHEXIDINE GLUCONATE 15 ML: 1.2 RINSE ORAL at 08:05

## 2018-10-11 RX ADMIN — AMIODARONE HYDROCHLORIDE 400 MG: 200 TABLET ORAL at 21:22

## 2018-10-11 RX ADMIN — AMIODARONE HYDROCHLORIDE 400 MG: 200 TABLET ORAL at 13:03

## 2018-10-11 RX ADMIN — METOPROLOL TARTRATE 25 MG: 25 TABLET, FILM COATED ORAL at 20:59

## 2018-10-11 RX ADMIN — ATORVASTATIN CALCIUM 40 MG: 40 TABLET, FILM COATED ORAL at 21:03

## 2018-10-11 RX ADMIN — Medication 400 MG: at 09:08

## 2018-10-11 RX ADMIN — VENLAFAXINE HYDROCHLORIDE 150 MG: 150 CAPSULE, EXTENDED RELEASE ORAL at 09:08

## 2018-10-11 RX ADMIN — DABIGATRAN ETEXILATE MESYLATE 150 MG: 150 CAPSULE ORAL at 17:49

## 2018-10-11 RX ADMIN — VENLAFAXINE HYDROCHLORIDE 150 MG: 150 CAPSULE, EXTENDED RELEASE ORAL at 17:40

## 2018-10-11 RX ADMIN — BUMETANIDE 2 MG: 0.25 INJECTION INTRAMUSCULAR; INTRAVENOUS at 11:47

## 2018-10-11 RX ADMIN — MUPIROCIN: 20 OINTMENT TOPICAL at 17:40

## 2018-10-11 RX ADMIN — POTASSIUM CHLORIDE 20 MEQ: 1.5 POWDER, FOR SOLUTION ORAL at 17:40

## 2018-10-11 RX ADMIN — PREGABALIN 200 MG: 100 CAPSULE ORAL at 09:08

## 2018-10-11 RX ADMIN — ENOXAPARIN SODIUM 100 MG: 100 INJECTION, SOLUTION INTRAVENOUS; SUBCUTANEOUS at 05:23

## 2018-10-11 RX ADMIN — INSULIN LISPRO 2 UNITS: 100 INJECTION, SOLUTION INTRAVENOUS; SUBCUTANEOUS at 11:48

## 2018-10-11 RX ADMIN — POTASSIUM CHLORIDE 20 MEQ: 1.5 POWDER, FOR SOLUTION ORAL at 09:07

## 2018-10-11 RX ADMIN — PREGABALIN 200 MG: 100 CAPSULE ORAL at 17:40

## 2018-10-11 RX ADMIN — ASPIRIN 81 MG CHEWABLE TABLET 81 MG: 81 TABLET CHEWABLE at 09:08

## 2018-10-11 RX ADMIN — METOPROLOL TARTRATE 25 MG: 25 TABLET, FILM COATED ORAL at 09:08

## 2018-10-11 NOTE — PROGRESS NOTES
Problem: Self Care Deficits Care Plan (Adult) Goal: *Acute Goals and Plan of Care (Insert Text) Occupational Therapy Goals: 
Initiated 10/11/2018 1. Patient will perform grooming standing with modified independence within 7 days. 2. Patient will perform dressing with modified independence within 7 days. 3. Patient will perform toileting with modified independence within 7 days. 4. Patient will bathing with modified independence within 7 days. 5. Patient will transfer from toilet with modified independence using the least restrictive device and appropriate durable medical equipment within 7 days. Occupational Therapy EVALUATION Patient: Francy Vernon (38 y.o. female) Date: 10/11/2018 Primary Diagnosis: Acute respiratory failure ([address] Precautions:    (EF 15-20%) ASSESSMENT : 
Based on the objective data described below, the patient presents with general weakness, decreased standing balance and overall decreased independence with ADLS. Pt was just extubated yesterday and O2 sat on 3 liters NC was 96%. BP was variable after activity but pt was asymptomatic and pt was able to remain OOB seated at bedside chair at end of session. SBA for supine to sit edge of bed with good dynamic seated balance. Pt was able to perform seated donning of socks with crossed leg technique with min assist for donning over toes of left foot and SBA for right. Min assist for sit to stand and min assist to mobilize to toilet with [facility] and left hand held assist.  Performed gown management in standing with CGA. Pt was able to perform jasmina care seated on commode as well as jasmina washing. Pts HR was elevated in the 130's after toileting and assisted pt back to bedside chair and gave pt wipes for her hands. Pt is performing UB ADLS at a CGA level and lower body ADLS at a min assist level. Pt may need short term SNF for rehab pending progress.  
 
Patient will benefit from skilled intervention to address the above impairments. Patients rehabilitation potential is considered to be Good Factors which may influence rehabilitation potential include:  
[x]             None noted []             Mental ability/status []             Medical condition []             Home/family situation and support systems []             Safety awareness []             Pain tolerance/management 
[]             Other: PLAN : 
Recommendations and Planned Interventions: 
[x]               Self Care Training                  [x]        Therapeutic Activities [x]               Functional Mobility Training    []        Cognitive Retraining 
[x]               Therapeutic Exercises           []        Endurance Activities [x]               Balance Training                   []        Neuromuscular Re-Education []               Visual/Perceptual Training     [x]   Home Safety Training 
[x]               Patient Education                 [x]        Family Training/Education []               Other (comment): Frequency/Duration: Patient will be followed by occupational therapy 4 times a week to address goals. Discharge Recommendations: Brennan Reyes Further Equipment Recommendations for Discharge: TBD SUBJECTIVE:  
Patient stated My  does the cooking and the cleaning.  OBJECTIVE DATA SUMMARY:  
HISTORY:  
Past Medical History:  
Diagnosis Date  Anxiety 1/22/2018  Arthritis OA  Asthma  Diabetes ([facility] [address]  Essential hypertension  GERD (gastroesophageal reflux disease)  Hypercholesterolemia 1/22/2018  Hypertension  Long-term use of high-risk medication 1/22/2018  Neuropathy  Other ill-defined conditions(799.89) IBS, spinal stenosis  Plantar fasciitis  Psychiatric disorder   
 depression, anxiety  Sleep apnea   
 uses CPAP  Type 2 diabetes mellitus with diabetic neuropathy, without long-term current use of insulin ([address] 6/5/2016 Past Surgical History: Procedure Laterality Date  CARDIAC SURG PROCEDURE UNLIST    
 stent  COLONOSCOPY N/A 3/14/2018 COLONOSCOPY performed by [facility]idi Oliva [facility] at [facility]  HX HYSTERECTOMY  HX PACEMAKER Prior Level of Function/Environment/Context: ambulated with [facility];  drives and performs IADLs; pt is able to perform ADLs on her own without assist 
Occupations in which the patient is/was successful, what are the barriers preventing that success:  
Performance Patterns (routines, roles, habits, and rituals):  
Personal Interests and/or values:  
Expanded or extensive additional review of patient history:  
 
Home Situation Home Environment: Private residence Living Alone: No 
Support Systems: Spouse/Significant Other/Partner Current DME Used/Available at Home: Cane, straight Tub or Shower Type: Tub/Shower combination Hand dominance: RightEXAMINATION OF PERFORMANCE DEFICITS: 
Cognitive/Behavioral Status: 
Neurologic State: Alert Orientation Level: Oriented X4 Cognition: Follows commands Perception: Appears intact Perseveration: No perseveration noted Safety/Judgement: Awareness of environment; Fall prevention;Home safety Hearing: Auditory Auditory Impairment: None Vision/Perceptual:   
    
    
    
  
    
    
Corrective Lenses: Glasses Range of Motion: 
 
AROM: Generally decreased, functional 
  
  
  
  
  
  
  
Strength: 
 
Strength: Generally decreased, functional 
  
  
  
  
Coordination: 
Coordination: Within functional limits Fine Motor Skills-Upper: Left Intact; Right Intact Gross Motor Skills-Upper: Left Intact; Right Intact Tone & Sensation: 
 
Tone: Normal 
Sensation: Impaired (neuropathy in tips of fingers and feet) Balance: 
Sitting: Intact Standing: Impaired Standing - Static: Fair Standing - Dynamic : Fair Functional Mobility and Transfers for ADLs:Bed Mobility: 
Rolling: Supervision Supine to Sit: Stand-by assistance Scooting: Supervision Transfers: 
Sit to Stand: Minimum assistance Stand to Sit: Minimum assistance Bed to Chair: Minimum assistance Bathroom Mobility: Minimum assistance (with SPC) Toilet Transfer : Minimum assistance ADL Assessment: 
Feeding: Independent Oral Facial Hygiene/Grooming: Minimum assistance Bathing: Minimum assistance Upper Body Dressing: Setup Lower Body Dressing: Minimum assistance Toileting: Minimum assistance ADL Intervention and task modifications: 
  
 
 see assessment Cognitive Retraining Safety/Judgement: Awareness of environment; Fall prevention;Home safety Therapeutic Exercise: 
Educated pt on seated LE exercises while seated in chair Functional Measure: 
Barthel Index: 
 
Bathin Bladder: 10 Bowels: 10 
Groomin Dressin Feeding: 10 Mobility: 5 Stairs: 0 Toilet Use: 5 Transfer (Bed to Chair and Back): 10 Total: 60 Barthel and G-code impairment scale: 
Percentage of impairment CH 
0% CI 
1-19% CJ 
20-39% CK 
40-59% CL 
60-79% CM 
80-99% CN 
100% Barthel Score 0-100 100 99-80 79-60 59-40 20-39 1-19 
 0 Barthel Score 0-20 20 17-19 13-16 9-12 5-8 1-4 0 The Barthel ADL Index: Guidelines 1. The index should be used as a record of what a patient does, not as a record of what a patient could do. 2. The main aim is to establish degree of independence from any help, physical or verbal, however minor and for whatever reason. 3. The need for supervision renders the patient not independent. 4. A patient's performance should be established using the best available evidence. Asking the patient, friends/relatives and nurses are the usual sources, but direct observation and common sense are also important. However direct testing is not needed. 5. Usually the patient's performance over the preceding 24-48 hours is important, but occasionally longer periods will be relevant. 6. Middle categories imply that the patient supplies over 50 per cent of the effort. 7. Use of aids to be independent is allowed. Isis Vargas., Barthel, D.W. (0549). Functional evaluation: the Barthel Index. [address] (14)2. ENRIQUE Nunez, Eddie Bustillo., [facility], [address] [place] [address] Ave (1999). Measuring the change indisability after inpatient rehabilitation; comparison of the responsiveness of the Barthel Index and Functional [place] Measure. Journal of Neurology, Neurosurgery, and Psychiatry, 66(4), 845-889. SU Sawyer, PAWAN Izaguirre, & Thong Patel M.A. (2004.) Assessment of post-stroke quality of life in cost-effectiveness studies: The usefulness of the Barthel Index and the EuroQoL-5D. [facility], 78, 990-20 G codes: In compliance with CMSs Claims Based Outcome Reporting, the following G-code set was chosen for this patient based on their primary functional limitation being treated: The outcome measure chosen to determine the severity of the functional limitation was the barthel with a score of 60/100 which was correlated with the impairment scale. ? Self Care:  
  - CURRENT STATUS: CJ - 20%-39% impaired, limited or restricted  - GOAL STATUS: CI - 1%-19% impaired, limited or restricted  - D/C STATUS:  ---------------To be determined--------------- Occupational Therapy Evaluation Charge Determination History Examination Decision-Making MEDIUM Complexity : Expanded review of history including physical, cognitive and psychosocial  history  MEDIUM Complexity : 3-5 performance deficits relating to physical, cognitive , or psychosocial skils that result in activity limitations and / or participation restrictions MEDIUM Complexity : Patient may present with comorbidities that affect occupational performnce.  Miniml to moderate modification of tasks or assistance (eg, physical or verbal ) with assesment(s) is necessary to enable patient to complete evaluation Based on the above components, the patient evaluation is determined to be of the following complexity level: MEDIUM Pain: 
Pain Scale 1: Numeric (0 - 10) Pain Intensity 1: 0 Activity Tolerance:  
 
Please refer to the flowsheet for vital signs taken during this treatment. After treatment:  
[x] Patient left in no apparent distress sitting up in chair 
[] Patient left in no apparent distress in bed 
[x] Call bell left within reach [x] Nursing notified 
[] Caregiver present 
[] Bed alarm activated COMMUNICATION/EDUCATION:  
The patients plan of care was discussed with: Physical Therapist, Registered Nurse and patient. [] Home safety education was provided and the patient/caregiver indicated understanding. [x] Patient have participated as able in goal setting and plan of care. [x] Patient agree to work toward stated goals and plan of care. [] Patient understands intent and goals of therapy, but is neutral about his/her participation. [] Patient is unable to participate in goal setting and plan of care. This patients plan of care is appropriate for delegation to [facility]. Thank you for this referral. 
Harshil Guadarrama, OTR/L Time Calculation: 31 mins

## 2018-10-11 NOTE — PROGRESS NOTES
1915 bedside shift report received from off going [address] [place] [address]. 2000 full assessment completed, drowsy easily aroused. Denies pain or sob . On Room air. A fib 100 to 115. Slow to response. Hoarse voice. Slight neck edema. No stridor . Fine wheezing noted. 12 am on patient own CPAP. 90 to 92 pox. 2 am removed CPAP. Pox 88 on room air. Placed on 2 liters nasal cannula. 4 am full assessment unchanged. Resting. A fib 90 to 110. 
 
6 am VSS. Am lab noted. Dr Ena Nguyen aware and updated.  
 
715 am bedside shift report given to [facility]

## 2018-10-11 NOTE — PROGRESS NOTES
[facility], Critical Care, and Sleep Medicine Name: Yvonne Ellis MRN: [id number] : 1950 Hospital: [facility] Date: 10/11/2018 IMPRESSION:  
· Acute hypoxic respiratory failure extubated, improving. Has home CPAP. Weaning oxygen. · Acute laryngeal edema, stridor post extubation. Getting better. · Acute pulmonary edema, not much change. Oxygen on 2L NC. · Acute/chronic systolic/diastolic heart failure - LVEF 15% · Ischemic cardiomyopathy · SSS with pacemaker · Chronic afib, now in RVR, for increased metoprolol. Discussed with Dr. Kenny Tellez this am, getting placed on po amiodarone. · DM-monitor glucose. Advance diet as tolerated. · CKD · SHAYLA · Discussed with nurse this am.   
  
PLAN:  
· On IV bumex. · STopped the steroids · Start clear liquids. Advance diet as tolerated. · Off Sedation. [place] · Amiodarone drip off, now on po amiodarone. · Insulin/glycemic monitoring · Monitor creatinine · DVT/GI prophylaxis Subjective/Interval History:  
I have reviewed the flowsheet and previous days notes. Pt is alert and conversant this am. NO complaints. NO major changes last night. Denies any headaches, chest pain, abdominal pain or leg pain. When seen this am she is very sleepy, no distress. Events from yesterday last pm noted. Not able to obtain ROS or HPI from pt. The patient is critically ill on: Mechanical ventilation Review of Systems Unable to perform ROS: Acuity of condition Constitutional: Positive for fatigue. HENT: Positive for congestion. Negative for dental problem, drooling and ear discharge. Eyes: Negative. Respiratory: Positive for cough and stridor. Has mostly resolved. Cardiovascular: Negative. Gastrointestinal: Negative. Endocrine: Negative. Genitourinary: Negative. Musculoskeletal: Positive for back pain. Skin: Negative. Allergic/Immunologic: Negative. Neurological: Negative. Hematological: Negative. Psychiatric/Behavioral: Negative. Objective: 
Vital Signs:   
Visit Vitals  /74  Pulse 96  Temp 98 °F (36.7 °C)  Resp 13  Ht 5' 9\" (1.753 m)  Wt 102.9 kg (226 lb 13.7 oz)  SpO2 97%  Breastfeeding No  
 BMI 33.5 kg/m2 O2 Device: Nasal cannula O2 Flow Rate (L/min): 2 l/min Temp (24hrs), Av °F (36.7 °C), Min:97.8 °F (36.6 °C), Max:98.2 °F (36.8 °C) Intake/Output:  
Last shift:        
Last 3 shifts: 10/09 1901 - 10/11 0700 In: 3777 [P.O.:520; I.V.:770] Out: 2710 [Urine:2710] Intake/Output Summary (Last 24 hours) at 10/11/18 5162 Last data filed at 10/11/18 0700 Gross per 24 hour Intake              950 ml Output             1150 ml Net             -200 ml Hemodynamics:  
PAP:   CO:    
Wedge:   CI:    
CVP:    SVR:    
  PVR:    
 
Ventilator Settings: 
Mode Rate Tidal Volume Pressure FiO2 PEEP Assist control   450 ml    50 % 6 cm H20 Peak airway pressure: 25 cm H2O Minute ventilation: 9 l/min Physical Exam  
Constitutional: She appears well-developed and well-nourished. She does not appear ill. NO evidence of stridor this am.  
Conversant. Appears tired. HENT:  
Head: Normocephalic and atraumatic. Mouth/Throat: No oropharyngeal exudate. Mike not seem to have as much stridor this am.  
NO increased work of breathing. Eyes: No scleral icterus. Cardiovascular: An irregularly irregular rhythm present. Tachycardia present. No murmur heard. Pulmonary/Chest: Effort normal. No accessory muscle usage. No tachypnea. No respiratory distress. She has decreased breath sounds. She has no wheezes. She has no rales. Abdominal: Soft. Bowel sounds are normal. She exhibits no distension. There is no tenderness. Musculoskeletal: She exhibits edema. Skin: Skin is warm and dry. No rash noted.   
Psychiatric:  
 Sleepy not able to fully assess. Data:  
 
Current Facility-Administered Medications Medication Dose Route Frequency  potassium chloride (KLOR-CON) packet 20 mEq  20 mEq Oral BID WITH MEALS  metoprolol tartrate (LOPRESSOR) tablet 25 mg  25 mg Oral Q12H  
 dexamethasone (DECADRON) 4 mg/mL injection 4 mg  4 mg IntraVENous Q12H  
 insulin lispro (HUMALOG) injection   SubCUTAneous AC&HS  venlafaxine-SR (EFFEXOR-XR) capsule 150 mg  150 mg Oral BID  enoxaparin (LOVENOX) injection 100 mg  100 mg SubCUTAneous Q12H  
 magnesium oxide (MAG-OX) tablet 400 mg  400 mg Oral DAILY  pregabalin (LYRICA) capsule 200 mg  200 mg Oral BID  aspirin chewable tablet 81 mg  81 mg Oral DAILY  atorvastatin (LIPITOR) tablet 40 mg  40 mg Oral QHS  sodium chloride (NS) flush 5-10 mL  5-10 mL IntraVENous Q8H  
 amiodarone (CORDARONE) 375 mg/250 mL D5W infusion  0.5 mg/min IntraVENous TITRATE  bumetanide (BUMEX) injection 2 mg  2 mg IntraVENous Q12H  mupirocin (BACTROBAN) 2 % ointment   Both Nostrils BID  chlorhexidine (PERIDEX) 0.12 % mouthwash 15 mL  15 mL Oral BID Labs: 
Recent Labs 10/11/18 
 1094  10/10/18 
 5078  10/09/18 
 6230 WBC  11.1*  7.2  9.1 HGB  12.2  11.9  10.4* HCT  39.1  37.4  32.9*  
PLT  254  163  165 Recent Labs 10/11/18 
 0356  10/10/18 
 5104  10/09/18 
 1351  10/09/18 
 0249 NA  142  143  144  144  
K  3.3*  3.2*  3.4*  2.9*  
CL  105  107  108  108 CO2  30  30  26  31 GLU  165*  175*  176*  132* BUN  31*  23*  24*  25* CREA  1.36*  1.28*  1.47*  1.53* CA  8.3*  8.3*  8.2*  7.6*  
MG  2.2  2.2  2.2  2.2 PHOS  3.5  2.9   --   3.3 ALB  2.8*  2.8*  2.8*  2.6* TBILI  0.5  0.8  0.7  0.6 SGOT  15  13*  13*  16 ALT  15  14  15  13 Recent Labs 10/09/18 
 1030 PH  7.49* PCO2  37 PO2  70* HCO3  27* FIO2  40 Imaging: 
I have personally reviewed the patients radiographs and have reviewed the reports: Pulmonary edema, decreased since intubation 10-8-18: FINDINGS:  
A portable AP radiograph of the chest was obtained at 0434 hours. There is a 
pacemaker in the left chest with leads overlying the right atrium and ventricle. Lines and tubes: The patient is on a cardiac monitor. The endotracheal tube, 
nasogastric tube and right jugular triple-lumen catheter are unchanged. Lungs: The lungs are clear. Pleura: There are pleural effusions which are unchanged. Mediastinum: The cardiac and mediastinal contours and pulmonary vascularity are 
normal. 
Bones and soft tissues: The bones and soft tissues are grossly within normal 
limits. 
  
 
IMPRESSION: No significant change.  
 
 
Genet Rodriguez MD

## 2018-10-11 NOTE — PROGRESS NOTES
Problem: Mobility Impaired (Adult and Pediatric) Goal: *Acute Goals and Plan of Care (Insert Text) Physical Therapy Goals Initiated 10/11/2018 1. Patient will move from supine to sit and sit to supine , scoot up and down and roll side to side in bed with independence within 7 day(s). 2.  Patient will transfer from bed to chair and chair to bed with modified independence using the least restrictive device within 7 day(s). 3.  Patient will perform sit to stand with modified independence within 7 day(s). 4.  Patient will ambulate with modified independence for 250 feet with the least restrictive device within 7 day(s). 5.  Patient will ascend/descend 3 stairs with 1 handrail(s) with supervision/set-up within 7 day(s). physical Therapy EVALUATION Patient: Chance Philippe (25 y.o. female) Date: 10/11/2018 Primary Diagnosis: Acute respiratory failure ([address] Precautions:  Fall (EF 15-20%) ASSESSMENT : 
Based on the objective data described below, the patient presents with impaired functional mobility secondary to impaired standing balance, impaired gait mechanics, generalized weakness, decreased AROM, limited endurance, and decreased activity tolerance following admission for acute respiratory failure. Hospitalization complicated by intubation and pt extubated on 10/9/18. Pt received supine in bed on 2 L/min O2 and agreeable to PT evaluation. Pt cleared by nursing for mobility. VSS at rest on 2 L/min O2 NC. No pain complaints. Bed mobility performed with supervision to SBA, needing VCs and encouragement to push with UEs to achieve upright sitting. Sitting balance intact while sitting on EOB and donning socks. Sit<>stand transfers performed with min A x 2 from EOB and commode. Needs cueing to shift weight forward and push through UEs during sit>stand transfer. Needed ~ 30 seconds in standing to improve standing balance prior to gait due to posterior lean.  She ambulated 6 ft x 2 with min A x 2 using SPC on R and HHA on L, however exhibits unsteady gait overall with widened MARISOL and short, shuffled steps. BP, SaO2, and RR stable, however HR increased (110-130s bpm) throughout activity. Pt with increased fatigue and was left sitting in bedside chair with all needs met, RN aware, and VSS on 2 L/min O2 following therapy session. Recommend pt discharge to SNF rehab to improve functional mobility and independence prior to returning home. PT will continue to follow to address mobility impairments as noted above. Patient will benefit from skilled intervention to address the above impairments. Patients rehabilitation potential is considered to be Fair Factors which may influence rehabilitation potential include:  
[]         None noted 
[]         Mental ability/status [x]         Medical condition 
[]         Home/family situation and support systems 
[]         Safety awareness 
[]         Pain tolerance/management 
[]         Other: PLAN : 
Recommendations and Planned Interventions: 
[x]           Bed Mobility Training             []    Neuromuscular Re-Education 
[x]           Transfer Training                   []    Orthotic/Prosthetic Training 
[x]           Gait Training                         []    Modalities [x]           Therapeutic Exercises           []    Edema Management/Control 
[x]           Therapeutic Activities            [x]    Patient and Family Training/Education 
[]           Other (comment): Frequency/Duration: Patient will be followed by physical therapy  4 times a week to address goals. Discharge Recommendations: Brennan Reyes Further Equipment Recommendations for Discharge: TBD by rehab SUBJECTIVE:  
Patient stated My  helps me if I need it.  OBJECTIVE DATA SUMMARY:  
HISTORY:   
Past Medical History:  
Diagnosis Date  Anxiety 1/22/2018  Arthritis OA  Asthma  Diabetes [address]  Essential hypertension  GERD (gastroesophageal reflux disease)  Hypercholesterolemia 1/22/2018  Hypertension  Long-term use of high-risk medication 1/22/2018  Neuropathy  Other ill-defined conditions(799.89) IBS, spinal stenosis  Plantar fasciitis  Psychiatric disorder   
 depression, anxiety  Sleep apnea   
 uses CPAP  Type 2 diabetes mellitus with diabetic neuropathy, without long-term current use of insulin ([facility] [address] [facility] 6/5/2016 Past Surgical History:  
Procedure Laterality Date  CARDIAC SURG PROCEDURE UNLIST    
 stent  COLONOSCOPY N/A 3/14/2018 COLONOSCOPY performed by Alba Eastman [facility] at [facility]  HX HYSTERECTOMY  HX PACEMAKER Prior Level of Function/Home Situation: Pt is mod I with SPC in the community and furniture walks around the home. Lives with . Performs ADLs independently except if she occasionally needs help from her . Does not drive or work. Reports 1 GLF ~ 2-3 months ago. Personal factors and/or comorbidities impacting plan of care: anxiety; arthritis; neuropathy; HTN; diabetes Home Situation Home Environment: Private residence # Steps to Enter: 3 Rails to Enter: Yes Hand Rails : Bilateral 
Wheelchair Ramp: No 
One/Two Story Residence: One story Living Alone: No 
Support Systems: Spouse/Significant Other/Partner Current DME Used/Available at Home: [address], [place], straight, Shower chair, Walker, rollator Tub or Shower Type: Tub/Shower combination EXAMINATION/PRESENTATION/DECISION MAKING: Critical Behavior: 
Neurologic State: Alert Orientation Level: Oriented X4 Cognition: Follows commands Safety/Judgement: Awareness of environment, Fall prevention, Home safety Hearing: Auditory Auditory Impairment: NoneSkin:  Intact Edema: None Range Of Motion: 
AROM: Generally decreased, functional 
  
  
  
  
  
  
  
Strength:   
Strength: Generally decreased, functional 
  
  
  
  
  
  
Tone & Sensation:  
Tone: Normal 
  
  
 Sensation: Impaired (neuropathy in tips of fingers and feet) Coordination: 
Coordination: Within functional limits Vision:  
Corrective Lenses: Glasses Functional Mobility: 
Bed Mobility: 
Rolling: Supervision Supine to Sit: Stand-by assistance Scooting: Supervision Transfers: 
Sit to Stand: Minimum assistance Stand to Sit: Minimum assistance Bed to Chair: Minimum assistance Balance:  
Sitting: Intact Standing: Impaired Standing - Static: Fair Standing - Dynamic : FairAmbulation/Gait Training:Distance (ft): 6 Feet (ft) (x 2 with seated break on commode) Assistive Device: Gait belt;Cane, straight (SPC on R and HHA on L) Ambulation - Level of Assistance: Minimal assistance;Assist x2; Additional time; Adaptive equipment Gait Description (WDL): Exceptions to [facility] [place] Gait Abnormalities: Decreased step clearance;Trunk sway increased; Step to gait Base of Support: Widened Speed/Brielle: Pace decreased (<100 feet/min); Shuffled Step Length: Left shortened;Right shortened Functional Measure: 
Barthel Index: 
 
Bathin Bladder: 10 Bowels: 10 
Groomin Dressin Feeding: 10 Mobility: 5 Stairs: 0 Toilet Use: 5 Transfer (Bed to Chair and Back): 10 Total: 60 Barthel and G-code impairment scale: 
Percentage of impairment CH 
0% CI 
1-19% CJ 
20-39% CK 
40-59% CL 
60-79% CM 
80-99% CN 
100% Barthel Score 0-100 100 99-80 79-60 59-40 20-39 1-19 
 0 Barthel Score 0-20 20 17-19 13-16 9-12 5-8 1-4 0 The Barthel ADL Index: Guidelines 1. The index should be used as a record of what a patient does, not as a record of what a patient could do. 2. The main aim is to establish degree of independence from any help, physical or verbal, however minor and for whatever reason. 3. The need for supervision renders the patient not independent.  
4. A patient's performance should be established using the best available evidence. Asking the patient, friends/relatives and nurses are the usual sources, but direct observation and common sense are also important. However direct testing is not needed. 5. Usually the patient's performance over the preceding 24-48 hours is important, but occasionally longer periods will be relevant. 6. Middle categories imply that the patient supplies over 50 per cent of the effort. 7. Use of aids to be independent is allowed. Tammy Muniz., Barthel, D.W. (8149). Functional evaluation: the Barthel Index. [address] [facility] (14)2. Natasha Katiuska femi ENRIQUE Hernandez, Diallo Novak., Vanna Campbell., Jennifer, [address] (1999). Measuring the change indisability after inpatient rehabilitation; comparison of the responsiveness of the Barthel Index and Functional [place] Measure. Journal of Neurology, Neurosurgery, and Psychiatry, 66(4), 987-535. Shahana Goldsmith, NMarleneJ.A, PAWAN Izaguirre, & Terell Price M.A. (2004.) Assessment of post-stroke quality of life in cost-effectiveness studies: The usefulness of the Barthel Index and the EuroQoL-5D. [facility], 15, 044-20 G codes: In compliance with CMSs Claims Based Outcome Reporting, the following G-code set was chosen for this patient based on their primary functional limitation being treated: The outcome measure chosen to determine the severity of the functional limitation was the Barthel Index with a score of 60/100 which was correlated with the impairment scale. ? Mobility - Walking and Moving Around:  
  - CURRENT STATUS: CJ - 20%-39% impaired, limited or restricted  - GOAL STATUS: CI - 1%-19% impaired, limited or restricted  - D/C STATUS:  ---------------To be determined--------------- Physical Therapy Evaluation Charge Determination History Examination Presentation Decision-Making HIGH Complexity :3+ comorbidities / personal factors will impact the outcome/ POC  HIGH Complexity : 4+ Standardized tests and measures addressing body structure, function, activity limitation and / or participation in recreation  MEDIUM Complexity : Evolving with changing characteristics  Other outcome measures Barthel Index  MEDIUM Based on the above components, the patient evaluation is determined to be of the following complexity level: MEDIUM Pain: 
Pain Scale 1: Numeric (0 - 10) Pain Intensity 1: 0 Activity Tolerance:  
Fair - -130s bpm with activity; SaO2, RR, and BP stable throughout on 2 L/min O2; no pain complaints; increased fatigue with activity Please refer to the flowsheet for vital signs taken during this treatment. After treatment:  
[x]         Patient left in no apparent distress sitting up in chair 
[]         Patient left in no apparent distress in bed 
[x]         Call bell left within reach [x]         Nursing notified 
[]         Caregiver present 
[]         Bed alarm activated COMMUNICATION/EDUCATION:  
The patients plan of care was discussed with: Physical Therapist, Occupational Therapist and Registered Nurse. [x]         Fall prevention education was provided and the patient/caregiver indicated understanding. [x]         Patient/family have participated as able in goal setting and plan of care. [x]         Patient/family agree to work toward stated goals and plan of care. []         Patient understands intent and goals of therapy, but is neutral about his/her participation. []         Patient is unable to participate in goal setting and plan of care. Thank you for this referral. 
Adrián Mi, PT, DPT Time Calculation: 28 mins

## 2018-10-11 NOTE — PROGRESS NOTES
Nutrition Assessment: 
 
RECOMMENDATIONS:  
Advance to Diabetic/Cardiac diet as medically able ASSESSMENT:  
Chart reviewed, case discussed during CCU rounds yesterday. Pt extubated Tuesday evening, needs re-estimated. Pt is currently on clear liquids, provided recommendations above for diet advancement. Pt is off of TF.  K+ 3.3, being repleted. PO intake is fair thus far. No BM yet. Dietitians Intervention(s)/Plan(s): Advance diet as tolerated, monitor appetite/PO intake SUBJECTIVE/OBJECTIVE:  
 
Diet Order: clear liquids  
% Eaten:  Patient Vitals for the past 72 hrs: 
 % Diet Eaten 10/10/18 1800 40 % 10/10/18 1300 30 % 10/10/18 0930 50 % Pertinent Medications:bumex, humalog, KCl, magox. Chemistries: 
Lab Results Component Value Date/Time Sodium 142 10/11/2018 03:56 AM  
 Potassium 3.3 (L) 10/11/2018 03:56 AM  
 Chloride 105 10/11/2018 03:56 AM  
 CO2 30 10/11/2018 03:56 AM  
 Anion gap 7 10/11/2018 03:56 AM  
 Glucose 165 (H) 10/11/2018 03:56 AM  
 BUN 31 (H) 10/11/2018 03:56 AM  
 Creatinine 1.36 (H) 10/11/2018 03:56 AM  
 BUN/Creatinine ratio 23 (H) 10/11/2018 03:56 AM  
 GFR est AA 47 (L) 10/11/2018 03:56 AM  
 GFR est non-AA 39 (L) 10/11/2018 03:56 AM  
 Calcium 8.3 (L) 10/11/2018 03:56 AM  
 Albumin 2.8 (L) 10/11/2018 03:56 AM  
  
Anthropometrics: Height: 5' 9\" (175.3 cm) Weight: 102.9 kg (226 lb 13.7 oz)   [x]bed scale(10/11)    []stated   []unknown IBW (%IBW): 65.9 kg (145 lb 4.5 oz) ( ) UBW (%UBW):   (  %) BMI: Body mass index is 33.5 kg/(m^2). This BMI is indicative of: 
[]Underweight   []Normal   []Overweight   [x] Obesity   [] Extreme Obesity (BMI>40) Estimated Nutrition Needs (Based on): 1961 (MSJ 1634 x 1.2) , 76 g (1gPro/kg ABW ) Protein Carbohydrate: At Least 130 g/day  Fluids: 2000 mL/day Last BM: PTA   [x]Active     []Hyperactive  []Hypoactive       [] Absent   BS Skin:    [x] Intact   [] Incision  [] Breakdown   [] DTI   [] Tears/Excoriation/Abrasion  [x]Edema(trace-all extremities)  [] Other: Wt Readings from Last 30 Encounters:  
10/11/18 102.9 kg (226 lb 13.7 oz) 09/10/18 107.5 kg (237 lb) 08/07/18 105.7 kg (233 lb) 08/01/18 108 kg (238 lb)  
03/14/18 118 kg (260 lb 3 oz) 02/22/18 119.3 kg (263 lb)  
01/22/18 104.6 kg (230 lb 9.6 oz) 09/08/17 115.3 kg (254 lb 3.1 oz) 01/19/17 99.8 kg (220 lb) 10/12/16 106.6 kg (235 lb)  
07/26/16 110.2 kg (243 lb) 07/02/16 111.9 kg (246 lb 11.1 oz) 06/28/16 112.3 kg (247 lb 9.6 oz) 06/15/16 114.3 kg (252 lb 1 oz) 04/19/16 112.5 kg (248 lb)  
01/17/16 104.3 kg (230 lb)  
02/24/15 104.8 kg (231 lb)  
08/25/14 103.9 kg (229 lb)  
07/17/14 108 kg (238 lb) 04/09/14 116.8 kg (257 lb 8 oz)  
11/20/13 113.4 kg (250 lb)  
02/22/13 113.4 kg (250 lb) 05/14/10 109 kg (240 lb 4.8 oz) NUTRITION DIAGNOSES:  
Problem:  Inadequate protein-energy intake Etiology: related to pt NPO 2' vent Signs/Symptoms: as evidenced by NPO + propofol meets <25% kcal and 0% protein needs. Previous dx re: inadequate protein energy intake continues, pt extubated but on clear liquids. NUTRITION INTERVENTIONS: 
Meals/Snacks: General/healthful diet GOAL:  
Pt will advance to solid diet and consume >50% of meals in 3-5 days. NUTRITION MONITORING AND EVALUATION Previous Goal: Pt will tolerate TF @ goal rate with residuals <250mL in 2-4 days Previous Goal Met: N/A (Pt no longer on TF) Previous Recommendations Implemented: Yes Cultural, Sabianist, or Ethnic Dietary Needs: None LEARNING NEEDS (Diet, Food/Nutrient-Drug Interaction):  
 [x] None Identified 
 [] Identified and Education Provided/Documented 
 [] Identified and Pt declined/was not appropriate [x] Interdisciplinary Care Plan Reviewed/Documented  
 [x] Participated in Discharge Planning: Diabetic/Cardiac diet  
 [] Interdisciplinary Rounds NUTRITION RISK:  
 [] High              [x] Moderate           []  Low  []  Minimal/Uncompromised Cheri Johnson RD, [facility] Pager [phone] Weekend Pager [phone]

## 2018-10-11 NOTE — PROGRESS NOTES
[address] [facility], [address] [place] [address] Ne, [address] [facility] 
[phone] Medical Progress Note NAME: Priya Flanagan :  1950 MRM:  [id number] Date/Time: 10/11/2018  5:46 AM 
  
Subjective:  
 
Admitted with acute respiratory failure with hypoxemia due to systolic CHF associated with atrial fib with RVR. Bedside EF 15-20%. Chest xray showed moderate CHF. Respiratory failure rapidly progressed with subsequent intubation. Patient on pressors for short period of time. Afib controlled with amiodarone with exacerbations due to hypokalemia related to diuresis. Successfully extubated  complicated by stridor. Patient currently awake, responds to questions with some confusion. Currently in afib at rate around 100-110. O2 sat's adequate on nasal O2. K+ 3.3 Past Medical History:  
Diagnosis Date  Anxiety 2018  Arthritis OA  Asthma  Diabetes ([address]  Essential hypertension  GERD (gastroesophageal reflux disease)  Hypercholesterolemia 2018  Hypertension  Long-term use of high-risk medication 2018  Neuropathy  Other ill-defined conditions(799.89) IBS, spinal stenosis  Plantar fasciitis  Psychiatric disorder   
 depression, anxiety  Sleep apnea   
 uses CPAP  Type 2 diabetes mellitus with diabetic neuropathy, without long-term current use of insulin ([address] 2016 ROS:  General: postive for weakness Respiratory:  positive for cough, congestion Cardiology:  negative for chest pain, palpitations, 
Gastrointestinal: negative for abdominal pain, N/V Muskuloskeletal : negative for arthralgia, myalgia Neurological: negative for headache, dizziness or focal deficits Psychological: positive for less anxiety, confusion Review of systems otherwise negative Objective:  
 
 
Vitals:  
 
  
Last 24hrs VS reviewed since prior progress note. Most recent are: 
 
Visit Vitals  /69  Pulse 98  Temp 98 °F (36.7 °C)  Resp 13  Ht 5' 9\" (1.753 m)  Wt 226 lb 13.7 oz (102.9 kg)  SpO2 95%  Breastfeeding No  
 BMI 33.5 kg/m2 SpO2 Readings from Last 6 Encounters:  
10/11/18 95% 09/10/18 96% 08/07/18 94% 08/01/18 96% 03/14/18 96% 02/22/18 97% O2 Flow Rate (L/min): 2 l/min Intake/Output Summary (Last 24 hours) at 10/11/18 0546 Last data filed at 10/11/18 0007 Gross per 24 hour Intake              770 ml Output             1075 ml Net             -305 ml Telemetry reviewed:   afib Tubes:   Vargas, central line X-Ray:  Admission chest xray - moderated CHF Procedures:   Echo - Left ventricle: Ejection fraction was estimated in the range of 15 % to 20 
%. There was diffuse hypokinesis. Mitral valve: There was mild regurgitation. Exam:  
 
General   well developed, well nourished, appears stated age in no respiratory distress Neck   Supple without nodes or mass. No thyromegaly or bruit Respiratory   Anterior rhonchi present Cardiology  RRR without murmur Abdominal  Soft, non-tender, non-distended, positive bowel sounds, no hepatosplenomegaly Extremities  Without LE edema Skin  Normal skin turgor. No rashes or skin ulcers noted Neurological No focal deficit noted Psychological  Alert, oriented x2 Exam otherwise negative Lab Data Reviewed: (see below) Medications Reviewed: (see below) 
 
______________________________________________________________________ Medications:  
 
Current Facility-Administered Medications Medication Dose Route Frequency  metoprolol tartrate (LOPRESSOR) tablet 25 mg  25 mg Oral Q12H  
 dexamethasone (DECADRON) 4 mg/mL injection 4 mg  4 mg IntraVENous Q12H  
 insulin lispro (HUMALOG) injection   SubCUTAneous AC&HS  
 glucose chewable tablet 16 g  4 Tab Oral PRN  
 dextrose (D50W) injection syrg 12.5-25 g  12.5-25 g IntraVENous PRN  
 glucagon (GLUCAGEN) injection 1 mg  1 mg IntraMUSCular PRN  
  venlafaxine-SR (EFFEXOR-XR) capsule 150 mg  150 mg Oral BID  metoprolol (LOPRESSOR) injection 5 mg  5 mg IntraVENous Q4H PRN  
 enoxaparin (LOVENOX) injection 100 mg  100 mg SubCUTAneous Q12H  
 magnesium oxide (MAG-OX) tablet 400 mg  400 mg Oral DAILY  fentaNYL citrate (PF) injection  mcg   mcg IntraVENous Q1H PRN  pregabalin (LYRICA) capsule 200 mg  200 mg Oral BID  aspirin chewable tablet 81 mg  81 mg Oral DAILY  atorvastatin (LIPITOR) tablet 40 mg  40 mg Oral QHS  clonazePAM (KlonoPIN) tablet 0.5 mg  0.5 mg Oral DAILY PRN  
 sodium chloride (NS) flush 5-10 mL  5-10 mL IntraVENous Q8H  
 sodium chloride (NS) flush 5-10 mL  5-10 mL IntraVENous PRN  
 acetaminophen (TYLENOL) tablet 650 mg  650 mg Oral Q6H PRN  
 docusate sodium (COLACE) capsule 100 mg  100 mg Oral DAILY PRN  
 amiodarone (CORDARONE) 375 mg/250 mL D5W infusion  0.5 mg/min IntraVENous TITRATE  prochlorperazine (COMPAZINE) with saline injection 5 mg  5 mg IntraVENous Q6H PRN  
 bumetanide (BUMEX) injection 2 mg  2 mg IntraVENous Q12H  mupirocin (BACTROBAN) 2 % ointment   Both Nostrils BID  chlorhexidine (PERIDEX) 0.12 % mouthwash 15 mL  15 mL Oral BID Lab Review:  
 
Recent Labs 10/11/18 
 9728  10/10/18 
 3676  10/09/18 
 0294 WBC  11.1*  7.2  9.1 HGB  12.2  11.9  10.4* HCT  39.1  37.4  32.9*  
PLT  254  163  165 Recent Labs 10/11/18 
 0356  10/10/18 
 2556  10/09/18 
 1351  10/09/18 
 0249 NA  142  143  144  144  
K  3.3*  3.2*  3.4*  2.9*  
CL  105  107  108  108 CO2  30  30  26  31 GLU  165*  175*  176*  132* BUN  31*  23*  24*  25* CREA  1.36*  1.28*  1.47*  1.53* CA  8.3*  8.3*  8.2*  7.6*  
MG  2.2  2.2  2.2  2.2 PHOS  3.5  2.9   --   3.3 ALB  2.8*  2.8*  2.8*  2.6* TBILI  0.5  0.8  0.7  0.6 SGOT  15  13*  13*  16 ALT  15  14  15  13 Lab Results Component Value Date/Time  Glucose (POC) 168 (H) 10/10/2018 10:15 PM  
 Glucose (POC) 124 (H) 10/10/2018 05:38 PM  
 Glucose (POC) 216 (H) 10/10/2018 12:00 PM  
 Glucose (POC) 97 01/20/2017 07:23 AM  
 Glucose (POC) 127 (H) 01/19/2017 11:03 PM  
 
Recent Labs 10/09/18 
 1030  10/08/18 
 0264 PH  7.49*  7.49* PCO2  37  36 PO2  70*  86 HCO3  27*  27* FIO2  40  40 No results for input(s): INR in the last 72 hours. No lab exists for component: INREXT, INREXT Assessment:  
 
Principal Problem: 
  Acute respiratory failure with hypoxemia ([address] (10/6/2018) Active Problems: 
  Type 2 diabetes mellitus with diabetic neuropathy, without long-term current use of insulin ([address] (6/5/2016) Acute systolic heart failure ([address] (4/4/2014) Atrial fibrillation with rapid ventricular response ([address] (10/7/2018) Hypokalemia (10/7/2018) Plan:  
 
Risk of deterioration: high 1. Continue O2 2. Continue IV bumex, amiodarone 3. Start oral KCl replacement 4. Cardiology following 5. Follow labs , Sat's Care Plan discussed with: Nursing Staff Discussed:  Care Plan Prophylaxis:  Lovenox and SCD's Disposition:  Unknown 
        
___________________________________________________ Attending Physician: Mikala Hernandez MD

## 2018-10-11 NOTE — PROGRESS NOTES
Cardiology Progress Note 10/11/2018    Admit Date: 10/6/2018 Admit Diagnosis: Acute respiratory failure (HCC)  CC:  None currently Cardiac Assessment/Plan:  
Ms Miko Bonilla has a h/o: 
1) CAD (LAD ISELA 2014 for NQMI), Neg PET for ischemia 9/2018. 
2) mixed ischemic/tachycardia mediated CM 4/2014 (EF 20%); EF 45% 11/2017. [facility] was too expensive. 3) HTN, 4) PAfib (RFA 4/2016 and 1/2017), Recurrrent/persistent afib 2017/2018 5) DM,  
6) SHAYLA (on CPAP),  
7) CKD (Cr 1.5 range). Aldactone stopped in 2014. 8) [facility] Don PPM 7/2014 for bradycardia while on therapy for AFib. CAD: LAD ISELA 2014; recent PET w/o ischemia but recurrent low EF: if not improved with med Rx, may need repeat cath. CM: Echo 10/9/18: EF 15-20%. If cant diurese well, may need milrinone. Eventually will need to get back on ACEi. Good UOP so far with IV diuretics. HTN: off metoprolol/lisinopril with low BPs after ETT/sedation: Levophed off since evening of 10/7; change to prn Jose: restart BBlocker: iv prn then PO as able. Rhythm: PAFib; recurrent; remains on IV amio; Dr Toño Rivera to see (considering AV node ablation/BiV PM or ICD). Cont anticoagulation. Renal function: Cr stable in 1.5 range; (Cr 1.3 range typically). Resp failure, SHAYLA, DM, [facility]  
For other plans, see orders. 10/10:  Remains in AFib. Extubated. Stridor resolved. Labs stable. Start metoprolol 25mg BID; eventual change to Toprol XL. Eventual resumption of ACEi/ARB. Continue amio; bolus this am.  Per Dr. Toño Rivera \"She'll need optimization of her heart failure status in the shortterm, not in good condition to undergo what I'd recommend. I think she'll need a BIV-ICD upgrade and AV node ablation ultimately for definitive management of her cardiomyopathy, heart failure, and tachycardia problem. \" 
 
10/11:  Mentation improved. Breathing better.   Remains in AFib; rates controlled. K low; replete. Continue metoprolol as tolerated; BPs marginal today. Will change to oral amio 400mg TID and stop IV gtt. Change lovenox back to pradaxa 150mg BID. Transfer to tele today. @ OV 10/2/18: 
Ms Riddhi Ronquillo has a h/o: 
1) CAD (LAD ISELA 2014 for NQMI), Neg PET for ischemia 9/2018. 
2) mixed ischemic/tachycardia mediated CM 4/2014 (EF 20%); EF 45% 11/2017. [facility] was too expensive. 3) HTN, 4) PAfib (RFA 4/2016 and 1/2017), Recurrrent/persistent afib 2017/2018 5) DM,  
6) SHAYLA (on CPAP),  
7) CKD (Cr 1.5 range). Aldactone stopped in 2014. 8) St Don PPM 7/2014 for bradycardia while on therapy for AFib. 
 
11/2017: No CP/STEEN; Back in afib today:  Coreg changed Toprol-XL. 10/2018:  Recent ER visit with epigastric pain; negative evaluation there & seen by Dr. Mary Rueda who set up a cardiac PET. The PET shows no ischemia but EF suggested at 16%. Of note patient does have AFib with accelerated ventricular response. No recurrence of epigastric discomfort. No bleeding. Had a simple trip at home a few weeks ago without injury. IMPRESSION AND PLAN 
  
01. (I25.10) Atherosclerotic heart disease of native coronary artery without angina pectoris:  No anginal symptoms. Cont asa and stopped plavix with need for anticoagulation. We discussed the signs and symptoms of unstable angina, myocardial infarction and malignant arrhythmia. The patient knows to seek immediate medical attention should they occur. ECG done today 02. (I42.0) Dilated cardiomyopathy:  Mixed ischemic/non-ischemic (tachycardia mediated) CM. Her ejection fraction is greater than 35% after repeat echo. The patient's systolic function has been stable. I suspect the PET has a falsely low EF related to her AFib but repeat echo is needed. 2-D w/CFD Echo to be done first available. 03. (I50.42) Chronic combined systolic (congestive) and diastolic (congestive) heart failure:  Resolved exertional symptoms. (NYHA I)  The patient is compliant with their CHF regimen. 04. (I48.1) Persistent atrial fibrillation:  Management per Dr French Oliva; currently off amio and on Pradaxa. Heart rate has been to elevated; increase Toprol  q.day. Further AFib monitoring/therapy per Dr. Jose Elias Rodriguez. 05. (I13.0) Hyp hrt & chr kdny dis w hrt fail and stg 1-4/unsp chr kdny:  Adequately controlled. We will see how the patient does with the med changes noted above. 06. (E78.2) Mixed hyperlipidemia:  Lipid labs drawn by PCP. The patient is tolerating lipid lowering therapy well. 07. (N18.3) Chronic kidney disease, stage 3 (moderate):  Cr 1.24/GFR46 11/2015. Cr 1.32/GFR 43 1/2017.  
08. (R60.0) Localized edema:  Resolved. She avoids salt. 09. (E11.69) Type 2 diabetes mellitus with other specified complication:  Lipids & HTN as noted above; DM managed by other MD.  
10. (Z95.0) Presence of cardiac pacemaker:  will continue to be followed in device clinic. 11. (Z79.82) Long term (current) use of aspirin:  This condition is stable. 12. (Z79.01) Long term (current) use of anticoagulants: This condition is stable. Indicated for atrial fibrillation. No bleeding. If she has recurrent falls, she knows to call for re-evaluation of anticoagulation. 13. (Z68.38) Body mass index (BMI) 38.0-38.9, adult:  The patient was instructed on AHA diet and regular exercise. ORDERS: 
    
1 ECG done today 2 2-D w/ CFD Echocardiogram first available. 3 Return office visit with Lazarus Banas. Caven MD in 6 Months. 4 The patient was instructed on AHA diet and regular exercise. 10/2/18 MEDICATION LIST Medication Sig Description  
amlodipine 5 mg tablet take 1 tablet by oral route  every day per pc  
aspirin 81 mg tablet,delayed release take 1 tablet by oral route  every day  
atorvastatin 40 mg tablet take 1 tablet by oral route  every evening bumetanide 1 mg tablet 1 in am on tues&thursday 2 tablets on mon wed fridays Calcium 600 600 mg (1,500 mg) tablet daily  
clonazepam 0.5 mg tablet take 1 tablet by oral route  every day LISINOPRIL 20 MG Tablet TAKE 1 TABLET EVERY DAY Lyrica 200 mg capsule take 1 capsule by oral route 2 times every day  
magnesium 250 mg tablet take 1 tablet by oral route  every day  
metoprolol succinate  mg tablet,extended release 24 hr take 1 tablet by oral route  every day  
potassium gluconate 500 mg (83 mg) tablet daily Pradaxa 150 mg capsule take 1 capsule by oral route 2 times every day Premarin 0.625 mg/gram vaginal cream insert 0.5 applicatorful by vaginal route  every day cyclically, 3 weeks on and 1 week off  
venlafaxine 75 mg tablet 2 po bid Vitamin D3 1,000 unit capsule take 1 daily  
 
____________________________________________________________________________________________________ CARDIAC HISTORY 
CAD: 
   
1 NSTEMI [95% Proximal LAD, Resolute (ISELA) to the Proximal LAD.] - 4/8/2014 CHF/CM: 
   
1 Mixed ischemic/tachycardic CM. [Nl TSH.] - 4/2014  
2 Ischemic & tachycardic Cardiomyopathy [Echo (EF 0.45) - 06/17/2014, (prev EF 20-30%)] - 6/2014 ARRHYTHMIA: 
   
1 A Fib [DCCV, Plan: amio started; Eliquis with Effient (change eliquis to asa if NSR after 30 days). ] - 4/8/2014  
2 PAF - 8/2010  
3 A Fib, recurrent; and 6/2014. [Had marked sinus bradycardia while on bblocker/dig.] - 5/2014  
4 Sinus Bradycardia. - 6/2/2014  
5 PPM (Devices (Dual Chamber PPM (Shipshewana Lax)) - 7/17/2014) - 7/17/2014  
6 PAF [CVR; Eliquis restarted (plavix stopped). ] - 9/2015  
7 A Fib [RFA] - 4/2016  
8 A Fib [RFA] - 1/2017 RISK FACTORS: 
   
1 Diabetes [Diagnosed in 2014] 2 Hypertension 3 Dyslipidemia CARDIOVASCULAR PROCEDURES Procedure Date Results Cardiac PET 09/24/2018 EF 0.16 (16%), physiologic apical thinning with no ischemia Cath 04/08/2014 95% Proximal LAD, Resolute (ISELA) to the Proximal LAD. DCCV 04/08/2014 Initial Rhythm A Fib, Final Rhythm Sinus Devices 07/17/2014 Dual Chamber PPM (Beryle Biarvind) Echo 11/28/2017 EF 0.45 (45%), Mild Global Hypo, Mild TR, Mild LVH, Mild MR, Est RVSP 41 mmHg, Normal RV. (probable trileaflet AoV). Echo 10/30/2015 EF 0.45 (45%), Mild LVH, LA Diam 4.1 cm, Est RVSP 35 mmHg, Normal RV. ?bicuspid AoV; (Prob trileaflet on previous ANGELITO). Echo 06/17/2014 EF 0.45 (45%), EF range 45%-50%, Mild LV dilatation. Mild LV systolic dysfunction. ? Bicuspid AoV but prob trileafet on previous ANGELITO. Echo 04/03/2014 EF 0.20 (20%), global HK with lateral sparing; no valve disease. EKG 10/02/2018 Atrial Fib, ; nonspecific T-wave flattening. Holter 06/09/2014 No Malignant Arrhythmias, Atrial Fib, No correlation with symptoms, (, ave 81.) Holter 04/30/2014 No Malignant Arrhythmias, Atrial Fib, No correlation with symptoms, \"light or heavy chest\" = afib in 60s. HR  (ave 63). ASx pauses (upto 2.8) while asleep. RFA 01/19/2017 Indication A Fib, Final Rhythm Sinus RFA  Indication A Fib Sleep Study  OSAS: Moderate ANGELITO 04/08/2014 EF 0.35 (35%), No BRAYDON Clot, prob trileaflet AoV. ACTIVE ALLERGIES: 
Ingredient Reaction Comment SACUBITRIL Shelly Fabio is too expensive SULFA (SULFONAMIDE ANTIBIOTICS) AMOXICILLIN TRIHYDRATE    
POTASSIUM CLAVULANATE ACTIVE INTOLERANCES: 
Description Reaction Comment SACUBITRIL Shelly Fabio is too expensive PROBLEM LIST: 
Problem Description Chronic Benign essential HTN Y  
DM type 2 N A-fib Y  
CAD Y Mixed hyperlipidemia Y  
 
 
PAST MEDICAL/SURGICAL HISTORY  (Detailed) Disease/disorder Onset Date Management Date Comments  
  hysterectomy A-FIB Cardiovascular disease CHF Hyperlipidemia Hypertension SHAYLA: moderate. 05/2014 Family History  (Detailed) Relationship Family Member Name  Age at Death Condition Onset Age Cause of Death Brother    Hypertension  N Mother    PAD  N Mother    Hypertension  N Mother    Cardiac arrhythmias  N Sister    Diabetes mellitus  N  
 
SOCIAL HISTORY  (Detailed) Preferred language is [place]. EDUCATION/EMPLOYMENT/OCCUPATION Employment History Status Retired Restrictions  
  retired    
  retired MARITAL STATUS/FAMILY/SOCIAL SUPPORT Currently . High complexity decision making was performed  X Yes High-risk of decompensation with multiple organ involvement X Yes Hospital problem list  
Active Hospital Problems Diagnosis Date Noted  Atrial fibrillation with rapid ventricular response ([facility] 10/07/2018  Hypokalemia 10/07/2018  Acute respiratory failure with hypoxemia ([facility] 10/06/2018  Type 2 diabetes mellitus with diabetic neuropathy, without long-term current use of insulin ([facility] 2016  Acute systolic heart failure ([facility] 2014 Subjective:  Patient reports  []   nothing; unable to communicate    [x]  just intubated Chest pain x none  consistent with:  Non-cardiac CP Atypical CP None now  On going  Anginal CP Dyspnea  none  at rest  with exertion    
   x improved  unchanged  worse PND x none  overnight Orthopnea x none  improved  unchanged  worse Presyncope x none  improved  unchanged  worse Ambulated in hallway without symptoms   Yes Ambulated in room without symptoms  Yes ROS Hematuria:  Yes X No Dysuria:  Yes X No  
            
(2+  other systems) Cough: Yes X No Sputum: Yes X No  
            
 BRBPR:  Yes X No Melena: Yes X No  
No change in family and social history from H&P/Consult note. Objective:  
 Physical Exam: 24 hr VS reviewed, overall VSSAF Temp (24hrs), Av °F (36.7 °C), Min:97.8 °F (36.6 °C), Max:98.2 °F (36.8 °C) Patient Vitals for the past 8 hrs: 
 Pulse 10/11/18 0700 96  
10/11/18 0600 98  
10/11/18 0500 98  
10/11/18 0400 (!) 103  
10/11/18 0300 (!) 112  
10/11/18 0200 (!) 113  
10/11/18 0100 (!) 106  
10/11/18 0000 (!) 116 Patient Vitals for the past 8 hrs: 
 Resp 10/11/18 0700 13  
10/11/18 0600 10  
10/11/18 0500 13  
10/11/18 0400 20  
10/11/18 0300 14  
10/11/18 0200 13  
10/11/18 0100 14  
10/11/18 0000 12 Patient Vitals for the past 8 hrs: 
 BP  
10/11/18 0700 104/74  
10/11/18 0600 124/75  
10/11/18 0500 105/69  
10/11/18 0300 (!) 130/113  
10/11/18 0200 (!) 134/106  
10/11/18 0100 125/65 Intake/Output Summary (Last 24 hours) at 10/11/18 5034 Last data filed at 10/11/18 0700 Gross per 24 hour Intake              950 ml Output             1150 ml Net             -200 ml General: x WD,WN; obese  Elderly  Cachetic x NAD Agitated  Lethargic  Arousable  Obtunded Sedated  On Bipap  Intubated ENT/Palate: X WNL  Dry MM  anicteric Respiratory: X Wheeze; ?stridor  Nl resp effort x Increased effort  No significant change  
 x rhonchi  rales  improved  worse Cardiovasc:  RRR x IRRR X Nl S1, S2 x No rub No murmur X No new murmur  Murmur c/w: x No gallop  
 x Min edema  BLE edema:+  RLE edema:+  LLE edema:+ Edema less  Edema more  Edema same  Edema worse X Nl JVP  Elevated JVP  JVP same  JVP worse X Carotid wnl x abd aorta not palpated X no peripheral emboli noted GI: X abd soft x nondistended X BS present X No organo- megaly noted Skin: X Warm, dry  Cold extremites Neuro: x A/seems O  Grossly non- focal  Obtunded  Sedated Lethargic  Arousable  intubated    
 
cath site intact w/o hematoma or new bruit; distal pulse unchanged  Yes Data Review: Telemetry independently reviewed  sinus  chronic afib x parox afib  NSVT  
 
ECG independently reviewed  NSR  afib  no significant changes  NSST-Tw chgs  
x no new ECG provided for review Lab results reviewed as noted below. Current medications reviewed as noted below. Recent Labs 10/09/18 
 1030 PH  7.49* PCO2  37 PO2  70* No results for input(s): CPK, CKMB, CKNDX, TROIQ in the last 72 hours. Recent Labs 10/11/18 
 0356  10/10/18 
 8865  10/09/18 
 1351  10/09/18 
 0249 NA  142  143  144  144  
K  3.3*  3.2*  3.4*  2.9*  
CL  105  107  108  108 CO2  30  30  26  31 BUN  31*  23*  24*  25* CREA  1.36*  1.28*  1.47*  1.53* GFRAA  47*  51*  43*  41* GLU  165*  175*  176*  132* PHOS  3.5  2.9   --   3.3 CA  8.3*  8.3*  8.2*  7.6* ALB  2.8*  2.8*  2.8*  2.6* WBC  11.1*  7.2   --   9.1 HGB  12.2  11.9   --   10.4* HCT  39.1  37.4   --   32.9*  
PLT  254  163   --   165 Recent Labs 10/11/18 
 0356  10/10/18 
 0418  10/09/18 
 1351 SGOT  15  13*  13* AP  106  103  105 TBILI  0.5  0.8  0.7 TP  5.8*  6.9  6.5 ALB  2.8*  2.8*  2.8*  
GLOB  3.0  4.1*  3.7 No results for input(s): INR, PTP, APTT in the last 72 hours. No lab exists for component: INREXT, INREXT No results for input(s): FE, TIBC, PSAT, FERR in the last 72 hours. Lab Results Component Value Date/Time Glucose (POC) 168 (H) 10/10/2018 10:15 PM  
 Glucose (POC) 124 (H) 10/10/2018 05:38 PM  
 Glucose (POC) 216 (H) 10/10/2018 12:00 PM  
 Glucose (POC) 97 01/20/2017 07:23 AM  
 Glucose (POC) 127 (H) 01/19/2017 11:03 PM  
 
 
Current Facility-Administered Medications Medication Dose Route Frequency  potassium chloride (KLOR-CON) packet 20 mEq  20 mEq Oral BID WITH MEALS  metoprolol tartrate (LOPRESSOR) tablet 25 mg  25 mg Oral Q12H  
 dexamethasone (DECADRON) 4 mg/mL injection 4 mg  4 mg IntraVENous Q12H  
 insulin lispro (HUMALOG) injection   SubCUTAneous AC&HS  
  glucose chewable tablet 16 g  4 Tab Oral PRN  
 dextrose (D50W) injection syrg 12.5-25 g  12.5-25 g IntraVENous PRN  
 glucagon (GLUCAGEN) injection 1 mg  1 mg IntraMUSCular PRN  
 venlafaxine-SR (EFFEXOR-XR) capsule 150 mg  150 mg Oral BID  metoprolol (LOPRESSOR) injection 5 mg  5 mg IntraVENous Q4H PRN  
 enoxaparin (LOVENOX) injection 100 mg  100 mg SubCUTAneous Q12H  
 magnesium oxide (MAG-OX) tablet 400 mg  400 mg Oral DAILY  fentaNYL citrate (PF) injection  mcg   mcg IntraVENous Q1H PRN  pregabalin (LYRICA) capsule 200 mg  200 mg Oral BID  aspirin chewable tablet 81 mg  81 mg Oral DAILY  atorvastatin (LIPITOR) tablet 40 mg  40 mg Oral QHS  clonazePAM (KlonoPIN) tablet 0.5 mg  0.5 mg Oral DAILY PRN  
 sodium chloride (NS) flush 5-10 mL  5-10 mL IntraVENous Q8H  
 sodium chloride (NS) flush 5-10 mL  5-10 mL IntraVENous PRN  
 acetaminophen (TYLENOL) tablet 650 mg  650 mg Oral Q6H PRN  
 docusate sodium (COLACE) capsule 100 mg  100 mg Oral DAILY PRN  
 amiodarone (CORDARONE) 375 mg/250 mL D5W infusion  0.5 mg/min IntraVENous TITRATE  prochlorperazine (COMPAZINE) with saline injection 5 mg  5 mg IntraVENous Q6H PRN  
 bumetanide (BUMEX) injection 2 mg  2 mg IntraVENous Q12H  mupirocin (BACTROBAN) 2 % ointment   Both Nostrils BID  chlorhexidine (PERIDEX) 0.12 % mouthwash 15 mL  15 mL Oral BID Isaac Setting III, DO

## 2018-10-11 NOTE — CARDIO/PULMONARY
Cardiopulmonary Rehab: 
   
Chart reviewed. Pt on the CHF bundle list. Pt is a 79 y.o. female admitted with Acute on chronic combined systolic/diastolic heart failure and acute resp failure.  
   
PMH includes stent, cardiomyopathy, LVEF 20%, HTN, A fib, SSS, CKD 3,  Diabetes,SHAYLA. Non smoker. Per notes to be transferred to telemetry today. Last noted CHF teaching 2017. Met with pt sitting up in chair. Living with Heart Failure Booklet given to Yolanda Brush. Educated using teach back method. Discussed diagnosis definition and assessed patient understanding. Reviewed importance of daily weight monitoring and Low Sodium diet (6047-4091 mg. Daily) or as ordered by Nephrologist.  Encouraged activity and rest periods within symptom limitations and as ordered by physician. Discussed importance of reporting signs and symptoms of exacerbation, and when to report them to the doctor, to prevent re-hospitalization. Yolanda Brush was encouraged to keep all appointments with doctor. Yolanda Brush could benefit from further education on the following HF topics: diet. States she lives with  and has a working scale. Encouraged to read labels and avoid processed foods. Will continue to follow.

## 2018-10-11 NOTE — PROGRESS NOTES
0700: Received bedside and verbal shift report from Acosta Koch [facility]. 
 
0730: Assessment complete, see flowsheets for details; afebrile, vitals stable, no complaints of pain/discomfort. 0830: PT/OT at bedside, 2 assist up to chair. 1200: Vitals stable, afebrile, no complaints of pain/discomfort. 1600: Assessment complete, no changes noted, afebrile, vitals stable, no complaints of pain/discomfort. 1830: Patient voided 75ml, bladder scanner after was 0. 
 
1900: Gave bedside and verbal shift report to Acosta Koch RN.

## 2018-10-12 LAB
ANION GAP SERPL CALC-SCNC: 9 MMOL/L (ref 5–15)
BUN SERPL-MCNC: 40 MG/DL (ref 6–20)
BUN/CREAT SERPL: 25 (ref 12–20)
CALCIUM SERPL-MCNC: 8.2 MG/DL (ref 8.5–10.1)
CHLORIDE SERPL-SCNC: 104 MMOL/L (ref 97–108)
CO2 SERPL-SCNC: 27 MMOL/L (ref 21–32)
CREAT SERPL-MCNC: 1.58 MG/DL (ref 0.55–1.02)
ERYTHROCYTE [DISTWIDTH] IN BLOOD BY AUTOMATED COUNT: 17.7 % (ref 11.5–14.5)
GLUCOSE BLD STRIP.AUTO-MCNC: 106 MG/DL (ref 65–100)
GLUCOSE BLD STRIP.AUTO-MCNC: 115 MG/DL (ref 65–100)
GLUCOSE BLD STRIP.AUTO-MCNC: 127 MG/DL (ref 65–100)
GLUCOSE BLD STRIP.AUTO-MCNC: 98 MG/DL (ref 65–100)
GLUCOSE SERPL-MCNC: 114 MG/DL (ref 65–100)
HCT VFR BLD AUTO: 40.6 % (ref 35–47)
HGB BLD-MCNC: 13 G/DL (ref 11.5–16)
MAGNESIUM SERPL-MCNC: 2.1 MG/DL (ref 1.6–2.4)
MCH RBC QN AUTO: 28.8 PG (ref 26–34)
MCHC RBC AUTO-ENTMCNC: 32 G/DL (ref 30–36.5)
MCV RBC AUTO: 90 FL (ref 80–99)
NRBC # BLD: 0 K/UL (ref 0–0.01)
NRBC BLD-RTO: 0 PER 100 WBC
PHOSPHATE SERPL-MCNC: 3.6 MG/DL (ref 2.6–4.7)
PLATELET # BLD AUTO: 282 K/UL (ref 150–400)
PMV BLD AUTO: 12 FL (ref 8.9–12.9)
POTASSIUM SERPL-SCNC: 3.3 MMOL/L (ref 3.5–5.1)
RBC # BLD AUTO: 4.51 M/UL (ref 3.8–5.2)
SERVICE CMNT-IMP: ABNORMAL
SERVICE CMNT-IMP: NORMAL
SODIUM SERPL-SCNC: 140 MMOL/L (ref 136–145)
WBC # BLD AUTO: 11.5 K/UL (ref 3.6–11)

## 2018-10-12 PROCEDURE — 74011250637 HC RX REV CODE- 250/637: Performed by: INTERNAL MEDICINE

## 2018-10-12 PROCEDURE — 97530 THERAPEUTIC ACTIVITIES: CPT

## 2018-10-12 PROCEDURE — 97535 SELF CARE MNGMENT TRAINING: CPT

## 2018-10-12 PROCEDURE — 84100 ASSAY OF PHOSPHORUS: CPT | Performed by: INTERNAL MEDICINE

## 2018-10-12 PROCEDURE — 65660000000 HC RM CCU STEPDOWN

## 2018-10-12 PROCEDURE — 82962 GLUCOSE BLOOD TEST: CPT

## 2018-10-12 PROCEDURE — 83735 ASSAY OF MAGNESIUM: CPT | Performed by: INTERNAL MEDICINE

## 2018-10-12 PROCEDURE — 80048 BASIC METABOLIC PNL TOTAL CA: CPT | Performed by: INTERNAL MEDICINE

## 2018-10-12 PROCEDURE — 97116 GAIT TRAINING THERAPY: CPT

## 2018-10-12 PROCEDURE — 77030014006 HC SPNG HEMSTAT J&J -A

## 2018-10-12 PROCEDURE — 85027 COMPLETE CBC AUTOMATED: CPT | Performed by: INTERNAL MEDICINE

## 2018-10-12 PROCEDURE — 77010033678 HC OXYGEN DAILY

## 2018-10-12 PROCEDURE — 36415 COLL VENOUS BLD VENIPUNCTURE: CPT | Performed by: INTERNAL MEDICINE

## 2018-10-12 RX ORDER — BUMETANIDE 1 MG/1
2 TABLET ORAL 2 TIMES DAILY
Status: DISCONTINUED | OUTPATIENT
Start: 2018-10-12 | End: 2018-10-13

## 2018-10-12 RX ORDER — ENOXAPARIN SODIUM 100 MG/ML
1 INJECTION SUBCUTANEOUS EVERY 24 HOURS
Status: DISCONTINUED | OUTPATIENT
Start: 2018-10-13 | End: 2018-10-17

## 2018-10-12 RX ORDER — POTASSIUM CHLORIDE 1.5 G/1.77G
20 POWDER, FOR SOLUTION ORAL
Status: DISCONTINUED | OUTPATIENT
Start: 2018-10-12 | End: 2018-10-13

## 2018-10-12 RX ADMIN — POTASSIUM CHLORIDE 20 MEQ: 1.5 POWDER, FOR SOLUTION ORAL at 18:05

## 2018-10-12 RX ADMIN — PREGABALIN 200 MG: 100 CAPSULE ORAL at 08:32

## 2018-10-12 RX ADMIN — DABIGATRAN ETEXILATE MESYLATE 150 MG: 150 CAPSULE ORAL at 23:01

## 2018-10-12 RX ADMIN — AMIODARONE HYDROCHLORIDE 400 MG: 200 TABLET ORAL at 05:28

## 2018-10-12 RX ADMIN — Medication 10 ML: at 05:29

## 2018-10-12 RX ADMIN — AMIODARONE HYDROCHLORIDE 400 MG: 200 TABLET ORAL at 21:01

## 2018-10-12 RX ADMIN — CONJUGATED ESTROGENS 0.5 G: 0.62 CREAM VAGINAL at 21:02

## 2018-10-12 RX ADMIN — Medication 10 ML: at 21:03

## 2018-10-12 RX ADMIN — METOPROLOL TARTRATE 25 MG: 25 TABLET, FILM COATED ORAL at 21:01

## 2018-10-12 RX ADMIN — METOPROLOL TARTRATE 25 MG: 25 TABLET, FILM COATED ORAL at 08:32

## 2018-10-12 RX ADMIN — VENLAFAXINE HYDROCHLORIDE 150 MG: 150 CAPSULE, EXTENDED RELEASE ORAL at 08:32

## 2018-10-12 RX ADMIN — AMIODARONE HYDROCHLORIDE 400 MG: 200 TABLET ORAL at 13:45

## 2018-10-12 RX ADMIN — POTASSIUM CHLORIDE 20 MEQ: 1.5 POWDER, FOR SOLUTION ORAL at 07:34

## 2018-10-12 RX ADMIN — PREGABALIN 200 MG: 100 CAPSULE ORAL at 18:05

## 2018-10-12 RX ADMIN — VENLAFAXINE HYDROCHLORIDE 150 MG: 150 CAPSULE, EXTENDED RELEASE ORAL at 18:05

## 2018-10-12 RX ADMIN — ATORVASTATIN CALCIUM 40 MG: 40 TABLET, FILM COATED ORAL at 21:01

## 2018-10-12 RX ADMIN — ASPIRIN 81 MG CHEWABLE TABLET 81 MG: 81 TABLET CHEWABLE at 08:32

## 2018-10-12 RX ADMIN — BUMETANIDE 2 MG: 1 TABLET ORAL at 18:05

## 2018-10-12 RX ADMIN — POTASSIUM CHLORIDE 20 MEQ: 1.5 POWDER, FOR SOLUTION ORAL at 11:53

## 2018-10-12 RX ADMIN — DABIGATRAN ETEXILATE MESYLATE 150 MG: 150 CAPSULE ORAL at 05:28

## 2018-10-12 RX ADMIN — Medication 10 ML: at 14:26

## 2018-10-12 NOTE — PROGRESS NOTES
MEWS 3 d/t HR. Patient has a-fib, being treated, MD aware. Will continue to monitor closely. Charge nurse notified.

## 2018-10-12 NOTE — PROGRESS NOTES
0700:  Bedside handoff report received from Kody Bass RN (offgoing nurse). Patient alert and oriented x 4, remains in a fib with RVR, BP stable, afebrile. SpO2 97%+ on room air. Patient denies pain; up in chair, resting comfortably. 9744: Attempted IV start x 2 to help facilitate removal of patient's central line. Unable to place IV at this time. 1020:  Peripheral IV started by Tatianna Valverde RN. 
 
5773:  Patient's central line removed per order; pressure held, hemostasis achieved. Patient with HOB less than 20 degrees. 1332:  TRANSFER - OUT REPORT: 
 
Verbal report given to Maryjane Morales RN on Cecy Diaz  being transferred to 2223([facility]) for routine progression of care Report consisted of patients Situation, Background, Assessment and  
Recommendations(SBAR). Information from the following report(s) SBAR, Kardex, ED Summary and Med Rec Status was reveiwed with the receiving nurse. Opportunity for questions and clarification was provided.    
 
 
 
 
Chandra Cordova RN

## 2018-10-12 NOTE — PROGRESS NOTES
1330: TRANSFER - IN REPORT: 
 
Verbal report received from Oscar(name) on Sarina English  being received from CCU(unit) for routine progression of care Report consisted of patients Situation, Background, Assessment and  
Recommendations(SBAR). Information from the following report(s) SBAR, Kardex, Intake/Output, MAR, Recent Results and Cardiac Rhythm Afib was reviewed with the receiving nurse. Opportunity for questions and clarification was provided. Assessment completed upon patients arrival to unit and care assumed. 1500: Primary Nurse Jacinto Kinney RN performed a dual skin assessment on this patient No impairment noted Preet score is 18. 
 
1900:  
Bedside shift change report given to Eladio Paulino RN (oncoming nurse). Report included the following information SBAR, Kardex, Intake/Output, MAR, Recent Results and Cardiac Rhythm Afib. SHIFT SUMMARY: 
 
 
 
 
 
[address] NURSING NOTE Admission Date 10/6/2018 Admission Diagnosis Acute respiratory failure ([address] Consults IP CONSULT TO CARDIOLOGY Cardiac Monitoring [x] Yes [] No  
  
Purposeful Hourly Rounding [x] Yes   
Ananya Score Total Score: 4 Ananya score 3 or > [x] Bed Alarm [] Avasys [] 1:1 sitter [] Patient refused (Signed refusal form in chart) Preet Score Preet Score: 18 Preet score 14 or < [] PMT consult [] Wound Care consult  
 []  Specialty bed  [] Nutrition consult Influenza Vaccine Received Flu Vaccine for Current Season (usually Sept-March): No  
 Patient/Guardian Refused (Notify MD): No  
  
Oxygen needs? [x] Room air Oxygen @  []1L    []2L    []3L   []4L    []5L   []6L via NC Chronic home O2 use? [] Yes [x] No 
Perform O2 challenge test and document in progress note using smartphrase (.Homeoxygen) Last bowel movement Last Bowel Movement Date: 10/10/18 Urinary Catheter [REMOVED] Urinary Catheter 10/06/18 Vargas-Indications for Use: Accurate measurement of urinary output [REMOVED] External Female Catheter 10/10/18-Urine Output (mL): 0 ml [REMOVED] External Female Catheter 10/12/18-Urine Output (mL): 500 ml [REMOVED] Urinary Catheter 10/06/18 Vargas-Urine Output (mL): 200 ml LDAs Peripheral IV 10/12/18 Right Antecubital (Active) Site Assessment Clean, dry, & intact 10/12/2018  2:38 PM  
Phlebitis Assessment 0 10/12/2018  2:38 PM  
Infiltration Assessment 0 10/12/2018  2:38 PM  
Dressing Status Clean, dry, & intact 10/12/2018  2:38 PM  
Dressing Type Transparent 10/12/2018  2:38 PM  
Hub Color/Line Status Pink;Flushed 10/12/2018  2:38 PM  
Alcohol Cap Used Yes 10/12/2018 10:24 AM  
                  
  
Readmission Risk Assessment Tool Score High Risk   
      
 27 Total Score 3 Has Seen PCP in Last 6 Months (Yes=3, No=0) 2 . Living with Significant Other. Assisted Living. LTAC. SNF. or  
Rehab  
 3 Patient Length of Stay (>5 days = 3)  
 5 Pt. Coverage (Medicare=5 , Medicaid, or Self-Pay=4) 14 Charlson Comorbidity Score (Age + Comorbid Conditions) Criteria that do not apply:  
 IP Visits Last 12 Months (1-3=4, 4=9, >4=11) Expected Length of Stay 4d 2h Actual Length of Stay 6

## 2018-10-12 NOTE — PROGRESS NOTES
[address] [place] [address] 
[phone] Medical Progress Note NAME: Sameer Merlos :  1950 MRM:  [id number] Date/Time: 10/12/2018  5:53 AM 
  
Subjective:  
 
Admitted with acute respiratory failure with hypoxemia due to systolic CHF associated with atrial fib with RVR. Bedside EF 15-20%. Chest xray showed moderate CHF. Respiratory failure rapidly progressed with subsequent intubation. Patient on pressors for short period of time. Afib controlled with amiodarone with exacerbations due to hypokalemia related to diuresis. Successfully extubated  complicated by stridor. Patient currently awake, sitting in chair, speech better and oriented x3. Currently in afib at rate around 100-110. O2 sat's adequate on nasal O2. K+ 3.3 Past Medical History:  
Diagnosis Date  Anxiety 2018  Arthritis OA  Asthma  Diabetes ([address]  Essential hypertension  GERD (gastroesophageal reflux disease)  Hypercholesterolemia 2018  Hypertension  Long-term use of high-risk medication 2018  Neuropathy  Other ill-defined conditions(799.89) IBS, spinal stenosis  Plantar fasciitis  Psychiatric disorder   
 depression, anxiety  Sleep apnea   
 uses CPAP  Type 2 diabetes mellitus with diabetic neuropathy, without long-term current use of insulin ([address] 2016 ROS:  General: postive for weakness Respiratory:  positive for cough, congestion Cardiology:  negative for chest pain, palpitations, 
Gastrointestinal: negative for abdominal pain, N/V Muskuloskeletal : negative for arthralgia, myalgia Neurological: negative for headache, dizziness or focal deficits Psychological: positive for less anxiety Review of systems otherwise negative Objective:  
 
 
Vitals:  
 
  
Last 24hrs VS reviewed since prior progress note. Most recent are: 
 
Visit Vitals  /79 (BP Patient Position: At rest)  Pulse 92  Temp 97.6 °F (36.4 °C)  Resp 12  Ht 5' 9\" (1.753 m)  Wt 226 lb 13.7 oz (102.9 kg)  SpO2 94%  Breastfeeding No  
 BMI 33.5 kg/m2 SpO2 Readings from Last 6 Encounters:  
10/12/18 94% 09/10/18 96% 08/07/18 94% 08/01/18 96% 03/14/18 96% 02/22/18 97% O2 Flow Rate (L/min): 2 l/min Intake/Output Summary (Last 24 hours) at 10/12/18 4284 Last data filed at 10/12/18 0430 Gross per 24 hour Intake            65.33 ml Output             1475 ml Net         -1409.67 ml Telemetry reviewed:   afib Tubes:   Vargas, central line X-Ray:  Admission chest xray - moderated CHF Procedures:   Echo - Left ventricle: Ejection fraction was estimated in the range of 15 % to 20 
%. There was diffuse hypokinesis. Mitral valve: There was mild regurgitation. Exam:  
 
General   well developed, well nourished, appears stated age in no respiratory distress Neck   Supple without nodes or mass. No thyromegaly or bruit Respiratory   Anterior rhonchi present Cardiology  RRR without murmur Abdominal  Soft, non-tender, non-distended, positive bowel sounds, no hepatosplenomegaly Extremities  Without LE edema Skin  Normal skin turgor. No rashes or skin ulcers noted Neurological No focal deficit noted Psychological  Alert, oriented x3 Exam otherwise negative Lab Data Reviewed: (see below) Medications Reviewed: (see below) 
 
______________________________________________________________________ Medications:  
 
Current Facility-Administered Medications Medication Dose Route Frequency  potassium chloride (KLOR-CON) packet 20 mEq  20 mEq Oral BID WITH MEALS  
 amiodarone (CORDARONE) tablet 400 mg  400 mg Oral Q8H  
 dabigatran etexilate (PRADAXA) capsule 150 mg  150 mg Oral Q12H  conjugated estrogens (PREMARIN) 0.625 mg/gram vaginal cream 0.5 g  0.5 g Vaginal EVERY TU & FRI  
  metoprolol tartrate (LOPRESSOR) tablet 25 mg  25 mg Oral Q12H  
 insulin lispro (HUMALOG) injection   SubCUTAneous AC&HS  
 glucose chewable tablet 16 g  4 Tab Oral PRN  
 dextrose (D50W) injection syrg 12.5-25 g  12.5-25 g IntraVENous PRN  
 glucagon (GLUCAGEN) injection 1 mg  1 mg IntraMUSCular PRN  
 venlafaxine-SR (EFFEXOR-XR) capsule 150 mg  150 mg Oral BID  magnesium oxide (MAG-OX) tablet 400 mg  400 mg Oral DAILY  pregabalin (LYRICA) capsule 200 mg  200 mg Oral BID  aspirin chewable tablet 81 mg  81 mg Oral DAILY  atorvastatin (LIPITOR) tablet 40 mg  40 mg Oral QHS  sodium chloride (NS) flush 5-10 mL  5-10 mL IntraVENous Q8H  
 sodium chloride (NS) flush 5-10 mL  5-10 mL IntraVENous PRN  
 acetaminophen (TYLENOL) tablet 650 mg  650 mg Oral Q6H PRN  
 docusate sodium (COLACE) capsule 100 mg  100 mg Oral DAILY PRN  
 bumetanide (BUMEX) injection 2 mg  2 mg IntraVENous Q12H Lab Review:  
 
Recent Labs 10/12/18 
 0776  10/11/18 
 0356  10/10/18 
 7147 WBC  11.5*  11.1*  7.2 HGB  13.0  12.2  11.9 HCT  40.6  39.1  37.4 PLT  282  254  163 Recent Labs 10/12/18 
 0358  10/11/18 
 0356  10/10/18 
 0418  10/09/18 
 1351 NA  140  142  143  144  
K  3.3*  3.3*  3.2*  3.4*  
CL  104  105  107  108 CO2  27  30  30  26 GLU  114*  165*  175*  176* BUN  40*  31*  23*  24* CREA  1.58*  1.36*  1.28*  1.47* CA  8.2*  8.3*  8.3*  8.2* MG  2.1  2.2  2.2  2.2 PHOS  3.6  3.5  2.9   --   
ALB   --   2.8*  2.8*  2.8* TBILI   --   0.5  0.8  0.7 SGOT   --   15  13*  13* ALT   --   15  14  15 Lab Results Component Value Date/Time Glucose (POC) 122 (H) 10/11/2018 09:21 PM  
 Glucose (POC) 142 (H) 10/11/2018 05:45 PM  
 Glucose (POC) 213 (H) 10/11/2018 11:34 AM  
 Glucose (POC) 158 (H) 10/11/2018 08:03 AM  
 Glucose (POC) 168 (H) 10/10/2018 10:15 PM  
 
Recent Labs 10/09/18 
 1030 PH  7.49* PCO2  37 PO2  70* HCO3  27* FIO2  40  
 
 No results for input(s): INR in the last 72 hours. No lab exists for component: INREXT, INREXT Assessment:  
 
Principal Problem: 
  Acute respiratory failure with hypoxemia ([facility] (10/6/2018) Active Problems: 
  Type 2 diabetes mellitus with diabetic neuropathy, without long-term current use of insulin ([facility] (6/5/2016) Acute systolic heart failure ([facility] (4/4/2014) Atrial fibrillation with rapid ventricular response ([facility] (10/7/2018) Hypokalemia (10/7/2018) Plan:  
 
Risk of deterioration: high 1. Continue O2 2. Continue IV bumex, amiodarone 3. Increase oral KCl replacement 4. Cardiology following 5. Follow labs , sat's 6. Await bed availability for transfer Care Plan discussed with: Nursing Staff, patient Discussed:  Care Plan Prophylaxis:  Lovenox and SCD's Disposition:  Unknown 
        
___________________________________________________ Attending Physician: Edgardo Guevara MD

## 2018-10-12 NOTE — PROGRESS NOTES
Problem: Self Care Deficits Care Plan (Adult) Goal: *Acute Goals and Plan of Care (Insert Text) Occupational Therapy Goals: 
Initiated 10/11/2018 1. Patient will perform grooming standing with modified independence within 7 days. 2. Patient will perform dressing with modified independence within 7 days. 3. Patient will perform toileting with modified independence within 7 days. 4. Patient will bathing with modified independence within 7 days. 5. Patient will transfer from toilet with modified independence using the least restrictive device and appropriate durable medical equipment within 7 days. Occupational Therapy TREATMENT Patient: Yolanda Brush (49 y.o. female) Date: 10/12/2018 Diagnosis: Acute respiratory failure ([address] Acute respiratory failure with hypoxemia (HCC) Precautions: Fall (EF 15-20%) Chart, occupational therapy assessment, plan of care, and goals were reviewed. ASSESSMENT: 
Nursing cleared patient for therapy prior to transferring units. Provided ed on safety, sequencing, body mechanics and pacing for bed mob, grooming at sink and mobility at RW Level. Completed sit <> stand with min- mod A x 2. Amb to sink with slow paced and forward flexed posture. Standing for oral care, washing face and washing hands with CGA-min A with mild posterior lean. HR elevated to 120s and mild SOB. Poor pleth noted on pulse ox on monitor with upper 80s-low 90%. Left in w/c with nursing present to transfer. Patient continues to present below baseline. Recommend dc to SNF for additional rehab once medically stable. Recommend with nursing patient to complete as able in order to maintain strength, endurance and independence: ADLs with supervision/setup for seated tasks, OOB to chair 3x/day and mobilizing to the bathroom for toileting with min A. Thank you for your assistance. Progression toward goals: 
[]       Improving appropriately and progressing toward goals [x]       Improving slowly and progressing toward goals 
[]       Not making progress toward goals and plan of care will be adjusted PLAN: 
Patient continues to benefit from skilled intervention to address the above impairments. Continue treatment per established plan of care. Discharge Recommendations:  Brennan Reyes Further Equipment Recommendations for Discharge:  TBD rehab SUBJECTIVE:  
Patient stated It felt good to sit up.  OBJECTIVE DATA SUMMARY:  
Cognitive/Behavioral Status: 
Neurologic State: Alert Orientation Level: Oriented X4 Cognition: Appropriate decision making; Appropriate for age attention/concentration; Appropriate safety awareness; Follows commands Functional Mobility and Transfers for ADLs:Bed Mobility: 
Rolling: Supervision Supine to Sit: Supervision Scooting: Supervision Transfers: 
Sit to Stand: Minimum assistance; Moderate assistance;Assist x2 Bed to Chair: Minimum assistance; Moderate assistance Balance: 
Sitting: Intact Standing: Impaired; With support Standing - Static: Fair Standing - Dynamic : Fair ADL Intervention: 
  Provided ed through verbal cues on safety, sequencing, body mechanics and pacing. Sit <> stand with min- mod A x 2 to RW with extensive cues for hand placement with RW/bed. Amb to sink with slow paced and forward flexed posture. Standing at sink with narrow MARISOL, ed to widen. While standing completed oral care, washing face and washing hands with CGA-min A with mild posterior lean. Amb to wc for transfer to step down unit. Grooming Washing Face: Contact guard assistance Washing Hands: Contact guard assistance Brushing Teeth: Contact guard assistance;Minimum assistance (mild posterior lean) Pain: 
Pain Scale 1: Numeric (0 - 10) Pain Intensity 1: 0 Activity Tolerance:   HR elevated to 120s and mild SOB. Poor pleth noted on pulse ox on monitor with upper 80s-low 90%. After treatment: [x] Patient left in no apparent distress sitting up in chair 
[] Patient left in no apparent distress in bed 
[x] Call bell left within reach [x] Nursing notified 
[] Caregiver present 
[] Bed alarm activated COMMUNICATION/COLLABORATION:  
The patients plan of care was discussed with: Physical Therapist and Registered Nurse Chance Starks OT Time Calculation: 24 mins

## 2018-10-12 NOTE — PROGRESS NOTES
[facility], Critical Care, and Sleep Medicine Name: Mark Tran MRN: [id number] : 1950 Hospital: [facility] Date: 10/12/2018 IMPRESSION:  
· Acute hypoxic respiratory failure mostly resolved. On NC oxygen. Using her home cpap at night. · Acute laryngeal edema, stridor post extubation. Resolved. · Acute pulmonary edema, not much change. Oxygen on 2L NC. Seems stable. · Acute/chronic systolic/diastolic heart failure - LVEF 15% · Ischemic cardiomyopathy · SSS with pacemaker · Chronic afib, now in RVR, for increased metoprolol. Discussed with Dr. Sophia Min this am, getting placed on po amiodarone. · DM-monitor glucose. Advance diet as tolerated. · CKD · SHAYLA · Discussed with nurse this am.  
· Awaiting bed outside ICU. PLAN:  
· On IV bumex. · diet tolerated well. .  
· po amiodarone. · Insulin/glycemic monitoring · Monitor creatinine · DVT/GI prophylaxis · Will see again as needed once out of ICU. Call if any issues. · Thank you. Subjective/Interval History:  
I have reviewed the flowsheet and previous days notes. Pt is alert and conversant this am. NO complaints. NO major changes last night. Was sitting up in chair when seen this am. NO chest pain, no back pain. Denies any headaches, chest pain, abdominal pain or leg pain. When seen this am she is very sleepy, no distress. Events from yesterday last pm noted. Not able to obtain ROS or HPI from pt. The patient is critically ill on: Mechanical ventilation Review of Systems Unable to perform ROS: Acuity of condition Constitutional: Positive for fatigue. HENT: Positive for congestion. Negative for dental problem, drooling and ear discharge. Eyes: Negative. Respiratory: Positive for cough. Negative for stridor. Has mostly resolved. Cardiovascular: Negative. Gastrointestinal: Negative. Endocrine: Negative. Genitourinary: Negative. Musculoskeletal: Positive for back pain. Skin: Negative. Allergic/Immunologic: Negative. Neurological: Negative. Hematological: Negative. Psychiatric/Behavioral: Negative. Objective: 
Vital Signs:   
Visit Vitals  /71 (BP 1 Location: Right arm, BP Patient Position: Sitting)  Pulse (!) 105  Temp 97.9 °F (36.6 °C)  Resp 15  Ht 5' 9\" (1.753 m)  Wt 102.9 kg (226 lb 13.7 oz)  SpO2 97%  Breastfeeding No  
 BMI 33.5 kg/m2 O2 Device: Room air O2 Flow Rate (L/min): 2 l/min Temp (24hrs), Av.8 °F (36.6 °C), Min:97.6 °F (36.4 °C), Max:98.2 °F (36.8 °C) Intake/Output:  
Last shift:        
Last 3 shifts: 10/10 1901 - 10/12 0700 In: 415.3 [P.O.:150; I.V.:265.3] Out: 2099 [Urine:2425] Intake/Output Summary (Last 24 hours) at 10/12/18 1145 Last data filed at 10/12/18 3149 Gross per 24 hour Intake           145.33 ml Output             1925 ml Net         -1779.67 ml Hemodynamics:  
PAP:   CO:    
Wedge:   CI:    
CVP:    SVR:    
  PVR:    
 
Ventilator Settings: 
Mode Rate Tidal Volume Pressure FiO2 PEEP Assist control   450 ml    50 % 6 cm H20 Peak airway pressure: 25 cm H2O Minute ventilation: 9 l/min Physical Exam  
Constitutional: She appears well-developed and well-nourished. She does not appear ill. NO evidence of stridor this am.  
Conversant. Appears tired. HENT:  
Head: Normocephalic and atraumatic. Mouth/Throat: No oropharyngeal exudate. Mike not seem to have as much stridor this am.  
NO increased work of breathing. Eyes: No scleral icterus. Cardiovascular: An irregularly irregular rhythm present. Tachycardia present. No murmur heard. Pulmonary/Chest: Effort normal. No accessory muscle usage. No tachypnea. No respiratory distress. She has decreased breath sounds. She has no wheezes. She has no rales. Abdominal: Soft. Bowel sounds are normal. She exhibits no distension. There is no tenderness. Musculoskeletal: She exhibits edema. Skin: Skin is warm and dry. No rash noted. Psychiatric:  
Sleepy not able to fully assess. Data:  
 
Current Facility-Administered Medications Medication Dose Route Frequency  potassium chloride (KLOR-CON) packet 20 mEq  20 mEq Oral TID WITH MEALS  
 amiodarone (CORDARONE) tablet 400 mg  400 mg Oral Q8H  
 dabigatran etexilate (PRADAXA) capsule 150 mg  150 mg Oral Q12H  conjugated estrogens (PREMARIN) 0.625 mg/gram vaginal cream 0.5 g  0.5 g Vaginal EVERY TU & FRI  metoprolol tartrate (LOPRESSOR) tablet 25 mg  25 mg Oral Q12H  
 insulin lispro (HUMALOG) injection   SubCUTAneous AC&HS  venlafaxine-SR (EFFEXOR-XR) capsule 150 mg  150 mg Oral BID  magnesium oxide (MAG-OX) tablet 400 mg  400 mg Oral DAILY  pregabalin (LYRICA) capsule 200 mg  200 mg Oral BID  aspirin chewable tablet 81 mg  81 mg Oral DAILY  atorvastatin (LIPITOR) tablet 40 mg  40 mg Oral QHS  sodium chloride (NS) flush 5-10 mL  5-10 mL IntraVENous Q8H  
 bumetanide (BUMEX) injection 2 mg  2 mg IntraVENous Q12H Labs: 
Recent Labs 10/12/18 
 7567  10/11/18 
 0356  10/10/18 
 4638 WBC  11.5*  11.1*  7.2 HGB  13.0  12.2  11.9 HCT  40.6  39.1  37.4 PLT  282  254  163 Recent Labs 10/12/18 
 0358  10/11/18 
 0356  10/10/18 
 0418  10/09/18 
 1351 NA  140  142  143  144  
K  3.3*  3.3*  3.2*  3.4*  
CL  104  105  107  108 CO2  27  30  30  26 GLU  114*  165*  175*  176* BUN  40*  31*  23*  24* CREA  1.58*  1.36*  1.28*  1.47* CA  8.2*  8.3*  8.3*  8.2* MG  2.1  2.2  2.2  2.2 PHOS  3.6  3.5  2.9   --   
ALB   --   2.8*  2.8*  2.8* TBILI   --   0.5  0.8  0.7 SGOT   --   15  13*  13* ALT   --   15  14  15 Recent Labs 10/09/18 
 1030 PH  7.49* PCO2  37 PO2  70* HCO3  27* FIO2  40 Imaging: I have personally reviewed the patients radiographs and have reviewed the reports: 
Pulmonary edema, decreased since intubation 10-8-18: FINDINGS:  
A portable AP radiograph of the chest was obtained at 0434 hours. There is a 
pacemaker in the left chest with leads overlying the right atrium and ventricle. Lines and tubes: The patient is on a cardiac monitor. The endotracheal tube, 
nasogastric tube and right jugular triple-lumen catheter are unchanged. Lungs: The lungs are clear. Pleura: There are pleural effusions which are unchanged. Mediastinum: The cardiac and mediastinal contours and pulmonary vascularity are 
normal. 
Bones and soft tissues: The bones and soft tissues are grossly within normal 
limits. 
  
 
IMPRESSION: No significant change.  
 
 
Ismael Padgett MD

## 2018-10-12 NOTE — PROGRESS NOTES
Cardiology Progress Note 10/12/2018    Admit Date: 10/6/2018 Admit Diagnosis: Acute respiratory failure (HCC)  CC:  None currently Cardiac Assessment/Plan:  
Ms Riddhi Ronquillo has a h/o: 
1) CAD (LAD ISELA 2014 for NQMI), Neg PET for ischemia 9/2018. 
2) mixed ischemic/tachycardia mediated CM 4/2014 (EF 20%); EF 45% 11/2017. Vera Amor was too expensive. 3) HTN, 4) PAfib (RFA 4/2016 and 1/2017), Recurrrent/persistent afib 2017/2018 5) DM,  
6) SHAYLA (on CPAP),  
7) CKD (Cr 1.5 range). Aldactone stopped in 2014. 8) St Don PPM 7/2014 for bradycardia while on therapy for AFib. CAD: LAD ISELA 2014; recent PET w/o ischemia but recurrent low EF: if not improved with med Rx, may need repeat cath. CM: Echo 10/9/18: EF 15-20%. If cant diurese well, may need milrinone. Eventually will need to get back on ACEi. HTN: off metoprolol/lisinopril with low BPs after ETT/sedation: Levophed off since evening of 10/7; change to prn Jose: restarted BBlocker Rhythm: PAFib; recurrent; remains on IV amio; Dr Melania Luevano to see (considering AV node ablation/BiV PM or ICD). Cont anticoagulation. Renal function: Cr stable in 1.5 range; (Cr 1.3 range typically). Resp failure, SHAYLA, DM, Dispo: per [facility] team.  
For other plans, see orders. 10/10:  Remains in AFib. Extubated. Stridor resolved. Labs stable. Start metoprolol 25mg BID; eventual change to Toprol XL. Eventual resumption of ACEi/ARB. Continue amio; bolus this am.  Per Dr. Melania Luevano \"She'll need optimization of her heart failure status in the shortterm, not in good condition to undergo what I'd recommend. I think she'll need a BIV-ICD upgrade and AV node ablation ultimately for definitive management of her cardiomyopathy, heart failure, and tachycardia problem. \" 
 
10/11:  Mentation improved. Breathing better. Remains in AFib; rates controlled. K low; replete.   Continue metoprolol as tolerated; BPs marginal today. Will change to oral amio 400mg TID and stop IV gtt. Change lovenox back to pradaxa 150mg BID. Transfer to tele today. 10/12: On room air, cr increasing, will change IV bumex to PO, will change pradaxa back to lovenox. Dr. Eugenia Patterson to evaluate on Monday regarding timing of AV node ablation, biV upgrade. @ OV 10/2/18: 
Ms Christine Baron has a h/o: 
1) CAD (LAD ISELA 2014 for NQMI), Neg PET for ischemia 9/2018. 
2) mixed ischemic/tachycardia mediated CM 4/2014 (EF 20%); EF 45% 11/2017. Roseann Macdonaldery was too expensive. 3) HTN, 4) PAfib (RFA 4/2016 and 1/2017), Recurrrent/persistent afib 2017/2018 5) DM,  
6) SHAYLA (on CPAP),  
7) CKD (Cr 1.5 range). Aldactone stopped in 2014. 8) St Don PPM 7/2014 for bradycardia while on therapy for AFib. 
 
11/2017: No CP/STEEN; Back in afib today:  Coreg changed Toprol-XL. 10/2018:  Recent ER visit with epigastric pain; negative evaluation there & seen by Dr. Jerilee Councilman who set up a cardiac PET. The PET shows no ischemia but EF suggested at 16%. Of note patient does have AFib with accelerated ventricular response. No recurrence of epigastric discomfort. No bleeding. Had a simple trip at home a few weeks ago without injury. IMPRESSION AND PLAN 
  
01. (I25.10) Atherosclerotic heart disease of native coronary artery without angina pectoris:  No anginal symptoms. Cont asa and stopped plavix with need for anticoagulation. We discussed the signs and symptoms of unstable angina, myocardial infarction and malignant arrhythmia. The patient knows to seek immediate medical attention should they occur. ECG done today 02. (I42.0) Dilated cardiomyopathy:  Mixed ischemic/non-ischemic (tachycardia mediated) CM. Her ejection fraction is greater than 35% after repeat echo. The patient's systolic function has been stable. I suspect the PET has a falsely low EF related to her AFib but repeat echo is needed. 2-D w/CFD Echo to be done first available. 03. (I50.42) Chronic combined systolic (congestive) and diastolic (congestive) heart failure:  Resolved exertional symptoms. (NYHA I)  The patient is compliant with their CHF regimen. 04. (I48.1) Persistent atrial fibrillation:  Management per Dr Danilo Jackson; currently off amio and on Pradaxa. Heart rate has been to elevated; increase Toprol  q.day. Further AFib monitoring/therapy per Dr. Alvin Epps. 05. (I13.0) Hyp hrt & chr kdny dis w hrt fail and stg 1-4/unsp chr kdny:  Adequately controlled. We will see how the patient does with the med changes noted above. 06. (E78.2) Mixed hyperlipidemia:  Lipid labs drawn by PCP. The patient is tolerating lipid lowering therapy well. 07. (N18.3) Chronic kidney disease, stage 3 (moderate):  Cr 1.24/GFR46 11/2015. Cr 1.32/GFR 43 1/2017.  
08. (R60.0) Localized edema:  Resolved. She avoids salt. 09. (E11.69) Type 2 diabetes mellitus with other specified complication:  Lipids & HTN as noted above; DM managed by other MD.  
10. (Z95.0) Presence of cardiac pacemaker:  will continue to be followed in device clinic. 11. (Z79.82) Long term (current) use of aspirin:  This condition is stable. 12. (Z79.01) Long term (current) use of anticoagulants: This condition is stable. Indicated for atrial fibrillation. No bleeding. If she has recurrent falls, she knows to call for re-evaluation of anticoagulation. 13. (Z68.38) Body mass index (BMI) 38.0-38.9, adult:  The patient was instructed on AHA diet and regular exercise. ORDERS: 
    
1 ECG done today 2 2-D w/ CFD Echocardiogram first available. 3 Return office visit with Ezio Little MD in 6 Months. 4 The patient was instructed on AHA diet and regular exercise. 10/2/18 MEDICATION LIST Medication Sig Description  
amlodipine 5 mg tablet take 1 tablet by oral route  every day per pc  
aspirin 81 mg tablet,delayed release take 1 tablet by oral route  every day atorvastatin 40 mg tablet take 1 tablet by oral route  every evening  
bumetanide 1 mg tablet 1 in am on tues&thursday 2 tablets on mon wed fridays Calcium 600 600 mg (1,500 mg) tablet daily  
clonazepam 0.5 mg tablet take 1 tablet by oral route  every day LISINOPRIL 20 MG Tablet TAKE 1 TABLET EVERY DAY Lyrica 200 mg capsule take 1 capsule by oral route 2 times every day  
magnesium 250 mg tablet take 1 tablet by oral route  every day  
metoprolol succinate  mg tablet,extended release 24 hr take 1 tablet by oral route  every day  
potassium gluconate 500 mg (83 mg) tablet daily Pradaxa 150 mg capsule take 1 capsule by oral route 2 times every day Premarin 0.625 mg/gram vaginal cream insert 0.5 applicatorful by vaginal route  every day cyclically, 3 weeks on and 1 week off  
venlafaxine 75 mg tablet 2 po bid Vitamin D3 1,000 unit capsule take 1 daily  
 
____________________________________________________________________________________________________ CARDIAC HISTORY 
CAD: 
   
1 NSTEMI [95% Proximal LAD, Resolute (ISELA) to the Proximal LAD.] - 4/8/2014 CHF/CM: 
   
1 Mixed ischemic/tachycardic CM. [Nl TSH.] - 4/2014  
2 Ischemic & tachycardic Cardiomyopathy [Echo (EF 0.45) - 06/17/2014, (prev EF 20-30%)] - 6/2014 ARRHYTHMIA: 
   
1 A Fib [DCCV, Plan: amio started; Eliquis with Effient (change eliquis to asa if NSR after 30 days). ] - 4/8/2014  
2 PAF - 8/2010  
3 A Fib, recurrent; and 6/2014. [Had marked sinus bradycardia while on bblocker/dig.] - 5/2014  
4 Sinus Bradycardia. - 6/2/2014  
5 PPM (Devices (Dual Chamber PPM (Simone Braun)) - 7/17/2014) - 7/17/2014  
6 PAF [CVR; Eliquis restarted (plavix stopped). ] - 9/2015  
7 A Fib [RFA] - 4/2016  
8 A Fib [RFA] - 1/2017 RISK FACTORS: 
   
1 Diabetes [Diagnosed in 2014] 2 Hypertension 3 Dyslipidemia CARDIOVASCULAR PROCEDURES Procedure Date Results Cardiac PET 09/24/2018 EF 0.16 (16%), physiologic apical thinning with no ischemia Cath 04/08/2014 95% Proximal LAD, Resolute (ISELA) to the Proximal LAD. DCCV 04/08/2014 Initial Rhythm A Fib, Final Rhythm Sinus Devices 07/17/2014 Dual Chamber PPM (Jale Mannheim) Echo 11/28/2017 EF 0.45 (45%), Mild Global Hypo, Mild TR, Mild LVH, Mild MR, Est RVSP 41 mmHg, Normal RV. (probable trileaflet AoV). Echo 10/30/2015 EF 0.45 (45%), Mild LVH, LA Diam 4.1 cm, Est RVSP 35 mmHg, Normal RV. ?bicuspid AoV; (Prob trileaflet on previous ANGELITO). Echo 06/17/2014 EF 0.45 (45%), EF range 45%-50%, Mild LV dilatation. Mild LV systolic dysfunction. ? Bicuspid AoV but prob trileafet on previous ANGELITO. Echo 04/03/2014 EF 0.20 (20%), global HK with lateral sparing; no valve disease. EKG 10/02/2018 Atrial Fib, ; nonspecific T-wave flattening. Holter 06/09/2014 No Malignant Arrhythmias, Atrial Fib, No correlation with symptoms, (, ave [address].) Holter 04/30/2014 No Malignant Arrhythmias, Atrial Fib, No correlation with symptoms, \"light or heavy chest\" = afib in 60s. HR  (ave 63). ASx pauses (upto 2.8) while asleep. RFA 01/19/2017 Indication A Fib, Final Rhythm Sinus RFA  Indication A Fib Sleep Study  OSAS: Moderate ANGELITO 04/08/2014 EF 0.35 (35%), No BRAYDON Clot, prob trileaflet AoV. ACTIVE ALLERGIES: 
Ingredient Reaction Comment SACUBITRIL Claritza Montanez is too expensive SULFA (SULFONAMIDE ANTIBIOTICS) AMOXICILLIN TRIHYDRATE    
POTASSIUM CLAVULANATE ACTIVE INTOLERANCES: 
Description Reaction Comment SACUBITRIL Claritza Montanez is too expensive PROBLEM LIST: 
Problem Description Chronic Benign essential HTN Y  
DM type 2 N A-fib Y  
CAD Y Mixed hyperlipidemia Y  
 
 
PAST MEDICAL/SURGICAL HISTORY  (Detailed) Disease/disorder Onset Date Management Date Comments  
  hysterectomy A-FIB Cardiovascular disease CHF Hyperlipidemia Hypertension SHAYLA: moderate. 05/2014 Family History  (Detailed) Relationship Family Member Name  Age at Death Condition Onset Age Cause of Death Brother    Hypertension  N Mother    PAD  N Mother    Hypertension  N Mother    Cardiac arrhythmias  N Sister    Diabetes mellitus  N  
 
SOCIAL HISTORY  (Detailed) Preferred language is [place]. EDUCATION/EMPLOYMENT/OCCUPATION Employment History Status Retired Restrictions  
  retired    
  retired MARITAL STATUS/FAMILY/SOCIAL SUPPORT Currently . High complexity decision making was performed  X Yes High-risk of decompensation with multiple organ involvement X Yes Hospital problem list  
Active Hospital Problems Diagnosis Date Noted  Atrial fibrillation with rapid ventricular response ([facility] [address] 10/07/2018  Hypokalemia 10/07/2018  Acute respiratory failure with hypoxemia ([facility] [address] 10/06/2018  Type 2 diabetes mellitus with diabetic neuropathy, without long-term current use of insulin ([facility] [address] 2016  Acute systolic heart failure ([facility] [address] 2014 Subjective:  Patient reports  []   nothing; unable to communicate    [x]  just intubated Chest pain x none  consistent with:  Non-cardiac CP Atypical CP None now  On going  Anginal CP Dyspnea  none  at rest  with exertion    
   x improved  unchanged  worse PND x none  overnight Orthopnea x none  improved  unchanged  worse Presyncope x none  improved  unchanged  worse Ambulated in hallway without symptoms   Yes Ambulated in room without symptoms  Yes ROS Hematuria:  Yes X No Dysuria:  Yes X No  
            
(2+  other systems) Cough: Yes X No Sputum: Yes X No  
            
 BRBPR:  Yes X No Melena: Yes X No  
No change in family and social history from H&P/Consult note. Objective:  
 Physical Exam: 24 hr VS reviewed, overall VSSAF Temp (24hrs), Av.8 °F (36.6 °C), Min:97.6 °F (36.4 °C), Max:98.2 °F (36.8 °C) Patient Vitals for the past 8 hrs: 
 Pulse 10/12/18 0800 (!) 108  
10/12/18 0732 (!) 105  
10/12/18 0500 (!) 112  
10/12/18 0400 92  
10/12/18 0300 87  
10/12/18 0200 87 Patient Vitals for the past 8 hrs: 
 Resp 10/12/18 0800 17  
10/12/18 0732 15  
10/12/18 0500 22  
10/12/18 0400 12  
10/12/18 0300 13  
10/12/18 0200 13 Patient Vitals for the past 8 hrs: 
 BP  
10/12/18 0800 99/72  
10/12/18 0732 114/71  
10/12/18 0500 108/77  
10/12/18 0400 115/79  
10/12/18 0300 102/59  
10/12/18 0200 95/56 Intake/Output Summary (Last 24 hours) at 10/12/18 0941 Last data filed at 10/12/18 3564 Gross per 24 hour Intake              320 ml Output             1925 ml Net            -1605 ml General: x WD,WN; obese  Elderly  Cachetic x NAD Agitated  Lethargic  Arousable  Obtunded Sedated  On Bipap  Intubated ENT/Palate: X WNL  Dry MM  anicteric Respiratory: X Wheeze; ?stridor  Nl resp effort x Increased effort  No significant change  
 x rhonchi  rales  improved  worse Cardiovasc:  RRR x IRRR X Nl S1, S2 x No rub No murmur X No new murmur  Murmur c/w: x No gallop  
 x Min edema  BLE edema:+  RLE edema:+  LLE edema:+ Edema less  Edema more  Edema same  Edema worse X Nl JVP  Elevated JVP  JVP same  JVP worse X Carotid wnl x abd aorta not palpated X no peripheral emboli noted GI: X abd soft x nondistended X BS present X No organo- megaly noted Skin: X Warm, dry  Cold extremites Neuro: x A/seems O  Grossly non- focal  Obtunded  Sedated Lethargic  Arousable  intubated    
 
cath site intact w/o hematoma or new bruit; distal pulse unchanged  Yes Data Review:    
Telemetry independently reviewed  sinus  chronic afib x parox afib  NSVT ECG independently reviewed  NSR  afib  no significant changes  NSST-Tw chgs  
x no new ECG provided for review Lab results reviewed as noted below. Current medications reviewed as noted below. Recent Labs 10/09/18 
 1030 PH  7.49* PCO2  37 PO2  70* No results for input(s): CPK, CKMB, CKNDX, TROIQ in the last 72 hours. Recent Labs 10/12/18 
 3107  10/12/18 
 0112  10/11/18 
 0356  10/10/18 
 0418  10/09/18 
 1351 NA   --   140  142  143  144 K   --   3.3*  3.3*  3.2*  3.4*  
CL   --   104  105  107  108 CO2   --   27  30  30  26 BUN   --   40*  31*  23*  24* CREA   --   1.58*  1.36*  1.28*  1.47* GFRAA   --   40*  47*  51*  43* GLU   --   114*  165*  175*  176* PHOS   --   3.6  3.5  2.9   --   
CA   --   8.2*  8.3*  8.3*  8.2* ALB   --    --   2.8*  2.8*  2.8* WBC  11.5*   --   11.1*  7.2   --   
HGB  13.0   --   12.2  11.9   --   
HCT  40.6   --   39.1  37.4   --   
PLT  282   --   254  163   --   
 
Recent Labs 10/11/18 
 0356  10/10/18 
 0418  10/09/18 
 1351 SGOT  15  13*  13* AP  106  103  105 TBILI  0.5  0.8  0.7 TP  5.8*  6.9  6.5 ALB  2.8*  2.8*  2.8*  
GLOB  3.0  4.1*  3.7 No results for input(s): INR, PTP, APTT in the last 72 hours. No lab exists for component: INREXT, INREXT No results for input(s): FE, TIBC, PSAT, FERR in the last 72 hours. Lab Results Component Value Date/Time Glucose (POC) 106 (H) 10/12/2018 07:38 AM  
 Glucose (POC) 122 (H) 10/11/2018 09:21 PM  
 Glucose (POC) 142 (H) 10/11/2018 05:45 PM  
 Glucose (POC) 213 (H) 10/11/2018 11:34 AM  
 Glucose (POC) 158 (H) 10/11/2018 08:03 AM  
 
 
Current Facility-Administered Medications Medication Dose Route Frequency  potassium chloride (KLOR-CON) packet 20 mEq  20 mEq Oral TID WITH MEALS  influenza vaccine 2018-19 (6 mos+)(PF) (FLUARIX QUAD/FLULAVAL QUAD) injection 0.5 mL  0.5 mL IntraMUSCular PRIOR TO DISCHARGE  bumetanide (BUMEX) tablet 2 mg  2 mg Oral BID  
  [START ON 10/13/2018] enoxaparin (LOVENOX) injection 100 mg  1 mg/kg SubCUTAneous Q24H  
 amiodarone (CORDARONE) tablet 400 mg  400 mg Oral Q8H  
 dabigatran etexilate (PRADAXA) capsule 150 mg  150 mg Oral Q12H  conjugated estrogens (PREMARIN) 0.625 mg/gram vaginal cream 0.5 g  0.5 g Vaginal EVERY TU & FRI  metoprolol tartrate (LOPRESSOR) tablet 25 mg  25 mg Oral Q12H  
 insulin lispro (HUMALOG) injection   SubCUTAneous AC&HS  
 glucose chewable tablet 16 g  4 Tab Oral PRN  
 dextrose (D50W) injection syrg 12.5-25 g  12.5-25 g IntraVENous PRN  
 glucagon (GLUCAGEN) injection 1 mg  1 mg IntraMUSCular PRN  
 venlafaxine-SR (EFFEXOR-XR) capsule 150 mg  150 mg Oral BID  magnesium oxide (MAG-OX) tablet 400 mg  400 mg Oral DAILY  pregabalin (LYRICA) capsule 200 mg  200 mg Oral BID  aspirin chewable tablet 81 mg  81 mg Oral DAILY  atorvastatin (LIPITOR) tablet 40 mg  40 mg Oral QHS  sodium chloride (NS) flush 5-10 mL  5-10 mL IntraVENous Q8H  
 sodium chloride (NS) flush 5-10 mL  5-10 mL IntraVENous PRN  
 acetaminophen (TYLENOL) tablet 650 mg  650 mg Oral Q6H PRN  
 docusate sodium (COLACE) capsule 100 mg  100 mg Oral DAILY PRN Naty Salazar MD

## 2018-10-12 NOTE — PROGRESS NOTES
Problem: Mobility Impaired (Adult and Pediatric) Goal: *Acute Goals and Plan of Care (Insert Text) Physical Therapy Goals Initiated 10/11/2018 1. Patient will move from supine to sit and sit to supine , scoot up and down and roll side to side in bed with independence within 7 day(s). 2.  Patient will transfer from bed to chair and chair to bed with modified independence using the least restrictive device within 7 day(s). 3.  Patient will perform sit to stand with modified independence within 7 day(s). 4.  Patient will ambulate with modified independence for 250 feet with the least restrictive device within 7 day(s). 5.  Patient will ascend/descend 3 stairs with 1 handrail(s) with supervision/set-up within 7 day(s). physical Therapy TREATMENT Patient: Sarina Lovell (18 y.o. female) Date: 10/12/2018 Diagnosis: Acute respiratory failure ([address] Acute respiratory failure with hypoxemia (HCC) Precautions: Fall (EF 15-20%) Chart, physical therapy assessment, plan of care and goals were reviewed. ASSESSMENT: 
Pt received supine in bed and agreeable to PT intervention. Pt cleared by nursing for mobility. VSS on RA and no pain complaints at rest. Bed mobility performed with supervision with HOB flat and use of bed railings. VSS in sitting. Intact sitting balance noted. Sit<>stand transfers performed with min-mod A x 2. Pt needed increased assistance and max VCs to transfer into standing this date, particularly for weight shifting forward and hand placement with use of RW. She ambulated 8 ft total with min A x 1 using RW for support. Demonstrates slow, shuffled gait with widened MARISOL and increased trunk flexion throughout gait training. She stood at sink x ~ 3 minutes to perform ADLs, however needed frequent VCs for trunk posture and to improve hip and knee extension in static stance with performance of ADLs. HR up to 120s bpm with activity.  C/o fatigue following activity, however only mild SOB noted with inconsistent reading of SaO2 due to unreliable pleth. Pt was left sitting in W/C for transfer to new room with RN present and VSS. Continue to recommend pt discharge to SNF rehab to improve functional mobility and independence. Progression toward goals: 
[]    Improving appropriately and progressing toward goals [x]    Improving slowly and progressing toward goals 
[]    Not making progress toward goals and plan of care will be adjusted PLAN: 
Patient continues to benefit from skilled intervention to address the above impairments. Continue treatment per established plan of care. Discharge Recommendations:  Brennan Reyes Further Equipment Recommendations for Discharge:  TBD by rehab SUBJECTIVE:  
Patient stated It's a little hard.  OBJECTIVE DATA SUMMARY:  
Critical Behavior: 
Neurologic State: (P) Alert Orientation Level: (P) Oriented X4 Cognition: (P) Appropriate decision making, Appropriate for age attention/concentration, Appropriate safety awareness, Follows commands Safety/Judgement: Awareness of environment, Fall prevention, Home safety Functional Mobility Training: 
Bed Mobility: 
Rolling: Supervision Supine to Sit: Supervision Scooting: Supervision Transfers: 
Sit to Stand: Minimum assistance; Moderate assistance;Assist x2 Stand to Sit: Minimum assistance Bed to Chair: Minimum assistance; Moderate assistance Balance: 
Sitting: Intact Standing: Impaired; With support Standing - Static: Fair Standing - Dynamic : FairAmbulation/Gait Training: 
Distance (ft): 8 Feet (ft) Assistive Device: Gait belt;Walker, rolling Ambulation - Level of Assistance: Minimal assistance;Assist x1;Additional time; Adaptive equipment Gait Abnormalities: Decreased step clearance;Shuffling gait Base of Support: Widened Speed/Brielle: Pace decreased (<100 feet/min); Shuffled Step Length: Left shortened;Right shortened Pain: 
Pain Scale 1: Numeric (0 - 10) Pain Intensity 1: 0 Activity Tolerance:  
Fair/improving - increased activity; SaO2 88-96% on RA with activity; increased HR in afib with HR up to 120s bpm at the highest; no pain complaints Please refer to the flowsheet for vital signs taken during this treatment. After treatment:  
[x]    Patient left in no apparent distress sitting up in chair 
[]    Patient left in no apparent distress in bed 
[x]    Call bell left within reach [x]    Nursing notified 
[x]    Caregiver present 
[]    Bed alarm activated COMMUNICATION/COLLABORATION:  
The patients plan of care was discussed with: Physical Therapist, Occupational Therapist and Registered Nurse Roselia Rodriguez, PT, DPT Time Calculation: 24 mins

## 2018-10-12 NOTE — CARDIO/PULMONARY
Cardiopulmonary Rehab Nursing Entry: 
 
Pt on the CHF bundle list 
 
Cardiac Rehab: Chart reviewed and pt visited. This was a follow-up visit to answer questions and reinforce prior teaching re: CHF, S&Ss, medication management, Low NA diet, daily weights and when to call the doctor. Pt transferred out to [facility] this afternoon. She reported having read over written materials provided and denies any specific questions. Reviewed avoidance of processed, pre-packaged, canned goods and use of table salt when eating and cooking. Pt encouraged to read nutritional labels when grocery shopping. Pt verbalized understanding.

## 2018-10-12 NOTE — PROGRESS NOTES
1900 shift report received at bedside from [facility]. Patient in bed . Watching TV. More alert and conversant. 2000 full assessment as charted. VSS. Afib with controlled rate of 80 to 100. 
 
12 am eyes closed on round. CPAP on. VSS. 4 am  Resting, Afib 80 to 100. 
 
5 am awake. Assisted out of bed in chair. Attempted to put PIV  . With no blood return obtain. Right central line CDI. 
 
530 patient to [facility], void without difficulty. Denies sob. Dr Denver Smalls on rounds. 6 am staying in chair.  to 120 atrial fibrillation. Room air 95%. 7 am bedside report given to Gael Cunningham and Rajani.

## 2018-10-13 PROBLEM — N17.9 AKI (ACUTE KIDNEY INJURY) (HCC): Status: ACTIVE | Noted: 2018-10-13

## 2018-10-13 LAB
ANION GAP SERPL CALC-SCNC: 10 MMOL/L (ref 5–15)
BUN SERPL-MCNC: 58 MG/DL (ref 6–20)
BUN/CREAT SERPL: 27 (ref 12–20)
CALCIUM SERPL-MCNC: 8.7 MG/DL (ref 8.5–10.1)
CHLORIDE SERPL-SCNC: 103 MMOL/L (ref 97–108)
CO2 SERPL-SCNC: 23 MMOL/L (ref 21–32)
CREAT SERPL-MCNC: 2.13 MG/DL (ref 0.55–1.02)
ERYTHROCYTE [DISTWIDTH] IN BLOOD BY AUTOMATED COUNT: 18.6 % (ref 11.5–14.5)
GLUCOSE BLD STRIP.AUTO-MCNC: 108 MG/DL (ref 65–100)
GLUCOSE BLD STRIP.AUTO-MCNC: 110 MG/DL (ref 65–100)
GLUCOSE BLD STRIP.AUTO-MCNC: 119 MG/DL (ref 65–100)
GLUCOSE BLD STRIP.AUTO-MCNC: 126 MG/DL (ref 65–100)
GLUCOSE SERPL-MCNC: 103 MG/DL (ref 65–100)
HCT VFR BLD AUTO: 45.2 % (ref 35–47)
HGB BLD-MCNC: 14.2 G/DL (ref 11.5–16)
MCH RBC QN AUTO: 28.3 PG (ref 26–34)
MCHC RBC AUTO-ENTMCNC: 31.4 G/DL (ref 30–36.5)
MCV RBC AUTO: 90 FL (ref 80–99)
NRBC # BLD: 0 K/UL (ref 0–0.01)
NRBC BLD-RTO: 0 PER 100 WBC
PLATELET # BLD AUTO: 274 K/UL (ref 150–400)
PMV BLD AUTO: 11.9 FL (ref 8.9–12.9)
POTASSIUM SERPL-SCNC: 3.6 MMOL/L (ref 3.5–5.1)
RBC # BLD AUTO: 5.02 M/UL (ref 3.8–5.2)
SERVICE CMNT-IMP: ABNORMAL
SODIUM SERPL-SCNC: 136 MMOL/L (ref 136–145)
WBC # BLD AUTO: 13.4 K/UL (ref 3.6–11)

## 2018-10-13 PROCEDURE — 74011250637 HC RX REV CODE- 250/637: Performed by: INTERNAL MEDICINE

## 2018-10-13 PROCEDURE — 80048 BASIC METABOLIC PNL TOTAL CA: CPT | Performed by: INTERNAL MEDICINE

## 2018-10-13 PROCEDURE — 85027 COMPLETE CBC AUTOMATED: CPT | Performed by: INTERNAL MEDICINE

## 2018-10-13 PROCEDURE — 74011250636 HC RX REV CODE- 250/636: Performed by: INTERNAL MEDICINE

## 2018-10-13 PROCEDURE — 82962 GLUCOSE BLOOD TEST: CPT

## 2018-10-13 PROCEDURE — 36415 COLL VENOUS BLD VENIPUNCTURE: CPT | Performed by: INTERNAL MEDICINE

## 2018-10-13 PROCEDURE — 65660000000 HC RM CCU STEPDOWN

## 2018-10-13 RX ADMIN — AMIODARONE HYDROCHLORIDE 400 MG: 200 TABLET ORAL at 05:46

## 2018-10-13 RX ADMIN — METOPROLOL TARTRATE 25 MG: 25 TABLET, FILM COATED ORAL at 22:00

## 2018-10-13 RX ADMIN — PREGABALIN 200 MG: 100 CAPSULE ORAL at 09:12

## 2018-10-13 RX ADMIN — PREGABALIN 200 MG: 100 CAPSULE ORAL at 17:20

## 2018-10-13 RX ADMIN — Medication 10 ML: at 05:46

## 2018-10-13 RX ADMIN — Medication 400 MG: at 09:12

## 2018-10-13 RX ADMIN — VENLAFAXINE HYDROCHLORIDE 150 MG: 150 CAPSULE, EXTENDED RELEASE ORAL at 09:12

## 2018-10-13 RX ADMIN — VENLAFAXINE HYDROCHLORIDE 150 MG: 150 CAPSULE, EXTENDED RELEASE ORAL at 17:20

## 2018-10-13 RX ADMIN — Medication 10 ML: at 14:08

## 2018-10-13 RX ADMIN — AMIODARONE HYDROCHLORIDE 400 MG: 200 TABLET ORAL at 21:59

## 2018-10-13 RX ADMIN — AMIODARONE HYDROCHLORIDE 400 MG: 200 TABLET ORAL at 14:08

## 2018-10-13 RX ADMIN — POTASSIUM CHLORIDE 20 MEQ: 1.5 POWDER, FOR SOLUTION ORAL at 09:11

## 2018-10-13 RX ADMIN — Medication 10 ML: at 21:59

## 2018-10-13 RX ADMIN — METOPROLOL TARTRATE 25 MG: 25 TABLET, FILM COATED ORAL at 09:11

## 2018-10-13 RX ADMIN — ENOXAPARIN SODIUM 100 MG: 100 INJECTION, SOLUTION INTRAVENOUS; SUBCUTANEOUS at 09:12

## 2018-10-13 RX ADMIN — ATORVASTATIN CALCIUM 40 MG: 40 TABLET, FILM COATED ORAL at 21:59

## 2018-10-13 RX ADMIN — ASPIRIN 81 MG CHEWABLE TABLET 81 MG: 81 TABLET CHEWABLE at 09:12

## 2018-10-13 NOTE — PROGRESS NOTES
Cardiology Progress Note 10/13/2018    Admit Date: 10/6/2018 Admit Diagnosis: Acute respiratory failure (HCC)  CC:  None currently Cardiac Assessment/Plan:  
Ms Alvin Ybarra has a h/o: 
1) CAD (LAD ISELA 2014 for NQMI), Neg PET for ischemia 9/2018. 
2) mixed ischemic/tachycardia mediated CM 4/2014 (EF 20%); EF 45% 11/2017. Arjun Escort was too expensive. 3) HTN, 4) PAfib (RFA 4/2016 and 1/2017), Recurrrent/persistent afib 2017/2018 5) DM,  
6) SHAYLA (on CPAP),  
7) CKD (Cr 1.5 range). Aldactone stopped in 2014. 8) St Don PPM 7/2014 for bradycardia while on therapy for AFib. CAD: LAD ISELA 2014; recent PET w/o ischemia but recurrent low EF: if not improved with med Rx, may need repeat cath. CM: Echo 10/9/18: EF 15-20%. If cant diurese well, may need milrinone. Eventually will need to get back on ACEi. HTN: off metoprolol/lisinopril with low BPs after ETT/sedation: Levophed off since evening of 10/7; change to prn Jose: restarted BBlocker Rhythm: PAFib; recurrent; remains on IV amio; Dr Aston Ruiz to see (considering AV node ablation/BiV PM or ICD). Cont anticoagulation. Renal function: Cr stable in 1.5 range; (Cr 1.3 range typically). Resp failure[facility] SH[facility]LA[facility]  
For other plans, see orders. 10/10:  Remains in AFib. Extubated. Stridor resolved. Labs stable. Start metoprolol 25mg BID; eventual change to Toprol XL. Eventual resumption of ACEi/ARB. Continue amio; bolus this am.  Per Dr. Aston Ruiz \"She'll need optimization of her heart failure status in the shortterm, not in good condition to undergo what I'd recommend. I think she'll need a BIV-ICD upgrade and AV node ablation ultimately for definitive management of her cardiomyopathy, heart failure, and tachycardia problem. \" 
 
10/11:  Mentation improved. Breathing better. Remains in AFib; rates controlled. K low; replete.   Continue metoprolol as tolerated; BPs marginal today. Will change to oral amio 400mg TID and stop IV gtt. Change lovenox back to pradaxa 150mg BID. Transfer to tele today. 10/12: On room air, cr increasing, will change IV bumex to PO, will change pradaxa back to lovenox. Dr. Javi Saunders to evaluate on Tues regarding timing of AV node ablation, biV upgrade. 10/13: On room air, cr increased to 2, bumex currently on hold, will need to restart at some point. @ OV 10/2/18: 
Ms Esvin Amor has a h/o: 
1) CAD (LAD ISELA 2014 for NQMI), Neg PET for ischemia 9/2018. 
2) mixed ischemic/tachycardia mediated CM 4/2014 (EF 20%); EF 45% 11/2017. Shelly Jansen was too expensive. 3) HTN, 4) PAfib (RFA 4/2016 and 1/2017), Recurrrent/persistent afib 2017/2018 5) DM,  
6) SHAYLA (on CPAP),  
7) CKD (Cr 1.5 range). Aldactone stopped in 2014. 8) St Don PPM 7/2014 for bradycardia while on therapy for AFib. 
 
11/2017: No CP/STEEN; Back in afib today:  Coreg changed Toprol-XL. 10/2018:  Recent ER visit with epigastric pain; negative evaluation there & seen by Dr. Myron Pizano who set up a cardiac PET. The PET shows no ischemia but EF suggested at 16%. Of note patient does have AFib with accelerated ventricular response. No recurrence of epigastric discomfort. No bleeding. Had a simple trip at home a few weeks ago without injury. IMPRESSION AND PLAN 
  
01. (I25.10) Atherosclerotic heart disease of native coronary artery without angina pectoris:  No anginal symptoms. Cont asa and stopped plavix with need for anticoagulation. We discussed the signs and symptoms of unstable angina, myocardial infarction and malignant arrhythmia. The patient knows to seek immediate medical attention should they occur. ECG done today 02. (I42.0) Dilated cardiomyopathy:  Mixed ischemic/non-ischemic (tachycardia mediated) CM. Her ejection fraction is greater than 35% after repeat echo. The patient's systolic function has been stable. I suspect the PET has a falsely low EF related to her AFib but repeat echo is needed. 2-D w/CFD Echo to be done first available. 03. (I50.42) Chronic combined systolic (congestive) and diastolic (congestive) heart failure:  Resolved exertional symptoms. (NYHA I)  The patient is compliant with their CHF regimen. 04. (I48.1) Persistent atrial fibrillation:  Management per Dr Alberto Rodriguez; currently off amio and on Pradaxa. Heart rate has been to elevated; increase Toprol  q.day. Further AFib monitoring/therapy per Dr. Favian Reyna. 05. (I13.0) Hyp hrt & chr kdny dis w hrt fail and stg 1-4/unsp chr kdny:  Adequately controlled. We will see how the patient does with the med changes noted above. 06. (E78.2) Mixed hyperlipidemia:  Lipid labs drawn by PCP. The patient is tolerating lipid lowering therapy well. 07. (N18.3) Chronic kidney disease, stage 3 (moderate):  Cr 1.24/GFR46 11/2015. Cr 1.32/GFR 43 1/2017.  
08. (R60.0) Localized edema:  Resolved. She avoids salt. 09. (E11.69) Type 2 diabetes mellitus with other specified complication:  Lipids & HTN as noted above; DM managed by other MD.  
10. (Z95.0) Presence of cardiac pacemaker:  will continue to be followed in device clinic. 11. (Z79.82) Long term (current) use of aspirin:  This condition is stable. 12. (Z79.01) Long term (current) use of anticoagulants: This condition is stable. Indicated for atrial fibrillation. No bleeding. If she has recurrent falls, she knows to call for re-evaluation of anticoagulation. 13. (Z68.38) Body mass index (BMI) 38.0-38.9, adult:  The patient was instructed on AHA diet and regular exercise. ORDERS: 
    
1 ECG done today 2 2-D w/ CFD Echocardiogram first available. 3 Return office visit with Delgado Little MD in 6 Months. 4 The patient was instructed on AHA diet and regular exercise. 10/2/18 MEDICATION LIST Medication Sig Description amlodipine 5 mg tablet take 1 tablet by oral route  every day per pc  
aspirin 81 mg tablet,delayed release take 1 tablet by oral route  every day  
atorvastatin 40 mg tablet take 1 tablet by oral route  every evening  
bumetanide 1 mg tablet 1 in am on tues&thursday 2 tablets on mon wed fridays Calcium 600 600 mg (1,500 mg) tablet daily  
clonazepam 0.5 mg tablet take 1 tablet by oral route  every day LISINOPRIL 20 MG Tablet TAKE 1 TABLET EVERY DAY Lyrica 200 mg capsule take 1 capsule by oral route 2 times every day  
magnesium 250 mg tablet take 1 tablet by oral route  every day  
metoprolol succinate  mg tablet,extended release 24 hr take 1 tablet by oral route  every day  
potassium gluconate 500 mg (83 mg) tablet daily Pradaxa 150 mg capsule take 1 capsule by oral route 2 times every day Premarin 0.625 mg/gram vaginal cream insert 0.5 applicatorful by vaginal route  every day cyclically, 3 weeks on and 1 week off  
venlafaxine 75 mg tablet 2 po bid Vitamin D3 1,000 unit capsule take 1 daily  
 
____________________________________________________________________________________________________ CARDIAC HISTORY 
CAD: 
   
1 NSTEMI [95% Proximal LAD, Resolute (ISELA) to the Proximal LAD.] - 4/8/2014 CHF/CM: 
   
1 Mixed ischemic/tachycardic CM. [Nl TSH.] - 4/2014  
2 Ischemic & tachycardic Cardiomyopathy [Echo (EF 0.45) - 06/17/2014, (prev EF 20-30%)] - 6/2014 ARRHYTHMIA: 
   
1 A Fib [DCCV, Plan: amio started; Eliquis with Effient (change eliquis to asa if NSR after 30 days). ] - 4/8/2014  
2 PAF - 8/2010  
3 A Fib, recurrent; and 6/2014. [Had marked sinus bradycardia while on bblocker/dig.] - 5/2014  
4 Sinus Bradycardia. - 6/2/2014  
5 PPM (Devices (Dual Chamber PPM ([place])) - 7/17/2014) - 7/17/2014  
6 PAF [CVR; Eliquis restarted (plavix stopped). ] - 9/2015  
7 A Fib [RFA] - 4/2016  
8 A Fib [RFA] - 1/2017 RISK FACTORS: 
   
1 Diabetes [Diagnosed in 2014] 2 Hypertension 3 Dyslipidemia CARDIOVASCULAR PROCEDURES Procedure Date Results Cardiac PET 09/24/2018 EF 0.16 (16%), physiologic apical thinning with no ischemia Cath 04/08/2014 95% Proximal LAD, Resolute (ISELA) to the Proximal LAD. DCCV 04/08/2014 Initial Rhythm A Fib, Final Rhythm Sinus Devices 07/17/2014 Dual Chamber PPM (Mabel Kylee) Echo 11/28/2017 EF 0.45 (45%), Mild Global Hypo, Mild TR, Mild LVH, Mild MR, Est RVSP 41 mmHg, Normal RV. (probable trileaflet AoV). Echo 10/30/2015 EF 0.45 (45%), Mild LVH, LA Diam 4.1 cm, Est RVSP 35 mmHg, Normal RV. ?bicuspid AoV; (Prob trileaflet on previous ANGELITO). Echo 06/17/2014 EF 0.45 (45%), EF range 45%-50%, Mild LV dilatation. Mild LV systolic dysfunction. ? Bicuspid AoV but prob trileafet on previous ANGELITO. Echo 04/03/2014 EF 0.20 (20%), global HK with lateral sparing; no valve disease. EKG 10/02/2018 Atrial Fib, ; nonspecific T-wave flattening. Holter 06/09/2014 No Malignant Arrhythmias, Atrial Fib, No correlation with symptoms, (, ave 81.) Holter 04/30/2014 No Malignant Arrhythmias, Atrial Fib, No correlation with symptoms, \"light or heavy chest\" = afib in 60s. HR  (ave 63). ASx pauses (upto 2.8) while asleep. RFA 01/19/2017 Indication A Fib, Final Rhythm Sinus RFA  Indication A Fib Sleep Study  OSAS: Moderate ANGELITO 04/08/2014 EF 0.35 (35%), No BRAYDON Clot, prob trileaflet AoV. ACTIVE ALLERGIES: 
Ingredient Reaction Comment SACUBITRIL Brandy Six is too expensive SULFA (SULFONAMIDE ANTIBIOTICS) AMOXICILLIN TRIHYDRATE    
POTASSIUM CLAVULANATE ACTIVE INTOLERANCES: 
Description Reaction Comment SACUBITRIL Brandy Six is too expensive PROBLEM LIST: 
Problem Description Chronic Benign essential HTN Y  
DM type 2 N A-fib Y  
CAD Y Mixed hyperlipidemia Y  
 
 
PAST MEDICAL/SURGICAL HISTORY  (Detailed) Disease/disorder Onset Date Management Date Comments  
  hysterectomy A-FIB      
 Cardiovascular disease CHF Hyperlipidemia Hypertension SHAYLA: moderate. 2014 Family History  (Detailed) Relationship Family Member Name  Age at Death Condition Onset Age Cause of Death Brother    Hypertension  N Mother    PAD  N Mother    Hypertension  N Mother    Cardiac arrhythmias  N Sister    Diabetes mellitus  N  
 
SOCIAL HISTORY  (Detailed) Preferred language is [place]. EDUCATION/EMPLOYMENT/OCCUPATION Employment History Status Retired Restrictions  
  retired    
  retired MARITAL STATUS/FAMILY/SOCIAL SUPPORT Currently . High complexity decision making was performed  X Yes High-risk of decompensation with multiple organ involvement X Yes Hospital problem list  
Active Hospital Problems Diagnosis Date Noted  AYLIN (acute kidney injury) ([facility] [address] 10/13/2018 Suspect prerenal 
  
 Atrial fibrillation with rapid ventricular response ([address] 10/07/2018  Hypokalemia 10/07/2018  Acute respiratory failure with hypoxemia ([address] 10/06/2018  Type 2 diabetes mellitus with diabetic neuropathy, without long-term current use of insulin ([facility] [address] 2016  Acute systolic heart failure ([facility] [address] 2014 Subjective:  Patient reports  []   nothing; unable to communicate    [x]  just intubated Chest pain x none  consistent with:  Non-cardiac CP Atypical CP None now  On going  Anginal CP Dyspnea  none  at rest  with exertion    
   x improved  unchanged  worse PND x none  overnight Orthopnea x none  improved  unchanged  worse Presyncope x none  improved  unchanged  worse Ambulated in hallway without symptoms   Yes Ambulated in room without symptoms  Yes ROS Hematuria:  Yes X No Dysuria:  Yes X No  
            
 (2+  other systems) Cough: Yes X No Sputum: Yes X No  
            
 BRBPR:  Yes X No Melena: Yes X No  
No change in family and social history from H&P/Consult note. Objective:  
 Physical Exam: 
24 hr VS reviewed, overall VSSAF Temp (24hrs), Av.5 °F (36.4 °C), Min:97.4 °F (36.3 °C), Max:97.7 °F (36.5 °C) Patient Vitals for the past 8 hrs: 
 Pulse 10/13/18 0737 89  
10/13/18 0546 74  
10/13/18 0302 74 Patient Vitals for the past 8 hrs: 
 Resp 10/13/18 0737 20  
10/13/18 0302 18 Patient Vitals for the past 8 hrs: 
 BP  
10/13/18 0737 100/81  
10/13/18 0546 107/82  
10/13/18 0302 107/82 Intake/Output Summary (Last 24 hours) at 10/13/18 1054 Last data filed at 10/13/18 1472 Gross per 24 hour Intake              120 ml Output              800 ml Net             -680 ml General: x WD,WN; obese  Elderly  Cachetic x NAD Agitated  Lethargic  Arousable  Obtunded Sedated  On Bipap  Intubated ENT/Palate: X WNL  Dry MM  anicteric Respiratory: X Wheeze; ?stridor  Nl resp effort x Increased effort  No significant change  
 x rhonchi  rales  improved  worse Cardiovasc:  RRR x IRRR X Nl S1, S2 x No rub No murmur X No new murmur  Murmur c/w: x No gallop  
 x Min edema  BLE edema:+  RLE edema:+  LLE edema:+ Edema less  Edema more  Edema same  Edema worse X Nl JVP  Elevated JVP  JVP same  JVP worse X Carotid wnl x abd aorta not palpated X no peripheral emboli noted GI: X abd soft x nondistended X BS present X No organo- megaly noted Skin: X Warm, dry  Cold extremites Neuro: x A/seems O  Grossly non- focal  Obtunded  Sedated Lethargic  Arousable  intubated    
 
cath site intact w/o hematoma or new bruit; distal pulse unchanged  Yes Data Review:    
Telemetry independently reviewed  sinus  chronic afib x parox afib  NSVT ECG independently reviewed  NSR  afib  no significant changes  NSST-Tw chgs  
x no new ECG provided for review Lab results reviewed as noted below. Current medications reviewed as noted below. No results for input(s): PH, PCO2, PO2 in the last 72 hours. No results for input(s): CPK, CKMB, CKNDX, TROIQ in the last 72 hours. Recent Labs 10/13/18 
 6111  10/12/18 
 9970  10/12/18 
 6953  10/11/18 
 6577 NA  136   --   140  142  
K  3.6   --   3.3*  3.3*  
CL  103   --   104  105 CO2  23   --   27  30 BUN  58*   --   40*  31* CREA  2.13*   --   1.58*  1.36* GFRAA  28*   --   40*  47* GLU  103*   --   114*  165* PHOS   --    --   3.6  3.5  
CA  8.7   --   8.2*  8.3* ALB   --    --    --   2.8* WBC  13.4*  11.5*   --   11.1* HGB  14.2  13.0   --   12.2 HCT  45.2  40.6   --   39.1 PLT  274  282   --   254 Recent Labs 10/11/18 
 0356 SGOT  15  
AP  106 TBILI  0.5 TP  5.8* ALB  2.8*  
GLOB  3.0 No results for input(s): INR, PTP, APTT in the last 72 hours. No lab exists for component: INREXT, INREXT No results for input(s): FE, TIBC, PSAT, FERR in the last 72 hours. Lab Results Component Value Date/Time Glucose (POC) 110 (H) 10/13/2018 07:41 AM  
 Glucose (POC) 115 (H) 10/12/2018 09:03 PM  
 Glucose (POC) 98 10/12/2018 05:04 PM  
 Glucose (POC) 127 (H) 10/12/2018 11:14 AM  
 Glucose (POC) 106 (H) 10/12/2018 07:38 AM  
 
 
Current Facility-Administered Medications Medication Dose Route Frequency  influenza vaccine 2018-19 (6 mos+)(PF) (FLUARIX QUAD/FLULAVAL QUAD) injection 0.5 mL  0.5 mL IntraMUSCular PRIOR TO DISCHARGE  enoxaparin (LOVENOX) injection 100 mg  1 mg/kg SubCUTAneous Q24H  
 amiodarone (CORDARONE) tablet 400 mg  400 mg Oral Q8H  
 conjugated estrogens (PREMARIN) 0.625 mg/gram vaginal cream 0.5 g  0.5 g Vaginal EVERY TU & FRI  metoprolol tartrate (LOPRESSOR) tablet 25 mg  25 mg Oral Q12H  insulin lispro (HUMALOG) injection   SubCUTAneous AC&HS  
 glucose chewable tablet 16 g  4 Tab Oral PRN  
 dextrose (D50W) injection syrg 12.5-25 g  12.5-25 g IntraVENous PRN  
 glucagon (GLUCAGEN) injection 1 mg  1 mg IntraMUSCular PRN  
 venlafaxine-SR (EFFEXOR-XR) capsule 150 mg  150 mg Oral BID  magnesium oxide (MAG-OX) tablet 400 mg  400 mg Oral DAILY  pregabalin (LYRICA) capsule 200 mg  200 mg Oral BID  aspirin chewable tablet 81 mg  81 mg Oral DAILY  atorvastatin (LIPITOR) tablet 40 mg  40 mg Oral QHS  sodium chloride (NS) flush 5-10 mL  5-10 mL IntraVENous Q8H  
 sodium chloride (NS) flush 5-10 mL  5-10 mL IntraVENous PRN  
 acetaminophen (TYLENOL) tablet 650 mg  650 mg Oral Q6H PRN  
 docusate sodium (COLACE) capsule 100 mg  100 mg Oral DAILY PRN Morteza Engel MD

## 2018-10-13 NOTE — PROGRESS NOTES
Problem: Pressure Injury - Risk of 
Goal: *Prevention of pressure injury Document Preet Scale and appropriate interventions in the flowsheet. Outcome: Progressing Towards Goal 
Pressure Injury Interventions: 
Sensory Interventions: Assess changes in LOC Moisture Interventions: Absorbent underpads, Apply protective barrier, creams and emollients, Minimize layers Activity Interventions: Increase time out of bed, PT/OT evaluation Mobility Interventions: HOB 30 degrees or less, PT/OT evaluation Nutrition Interventions: Document food/fluid/supplement intake Friction and Shear Interventions: Apply protective barrier, creams and emollients, HOB 30 degrees or less, Lift sheet, Minimize layers Problem: Falls - Risk of 
Goal: *Absence of Falls Document Josr Fitch Fall Risk and appropriate interventions in the flowsheet. Outcome: Progressing Towards Goal 
Fall Risk Interventions: 
Mobility Interventions: Bed/chair exit alarm, Patient to call before getting OOB Mentation Interventions: Adequate sleep, hydration, pain control, Bed/chair exit alarm Medication Interventions: Evaluate medications/consider consulting pharmacy Elimination Interventions: Call light in reach, Patient to call for help with toileting needs, Toileting schedule/hourly rounds History of Falls Interventions: Bed/chair exit alarm

## 2018-10-13 NOTE — PROGRESS NOTES
[address] 
[phone] Medical Progress Note NAME: Bhavik Jameson :  1950 MRM:  [id number] Date/Time: 10/13/2018  8:17 AM 
  
Subjective:  
 
Admitted with acute respiratory failure with hypoxemia due to systolic CHF associated with atrial fib with RVR. Bedside EF 15-20%. Chest xray showed moderate CHF. Respiratory failure rapidly progressed with subsequent intubation. Patient on pressors for short period of time. Afib controlled with amiodarone with exacerbations due to hypokalemia related to diuresis. Successfully extubated  complicated by stridor. Patient currently awake, sitting in chair, speech better and oriented x3. Generally weak but otherwise denies SOB/PND/orthopnea/edema. Past Medical History:  
Diagnosis Date  Anxiety 2018  Arthritis OA  Asthma  Diabetes ([facility] [address]  Essential hypertension  GERD (gastroesophageal reflux disease)  Hypercholesterolemia 2018  Hypertension  Long-term use of high-risk medication 2018  Neuropathy  Other ill-defined conditions(799.89) IBS, spinal stenosis  Plantar fasciitis  Psychiatric disorder   
 depression, anxiety  Sleep apnea   
 uses CPAP  Type 2 diabetes mellitus with diabetic neuropathy, without long-term current use of insulin ([facility] [address] 2016 ROS:  General: postive for weakness Respiratory:  positive for cough, congestion Cardiology:  negative for chest pain, palpitations, 
Gastrointestinal: negative for abdominal pain, N/V Muskuloskeletal : negative for arthralgia, myalgia Neurological: negative for headache, dizziness or focal deficits Psychological: positive for less anxiety Review of systems otherwise negative Objective:  
 
 
Vitals:  
 
  
Last 24hrs VS reviewed since prior progress note. Most recent are: 
 
Visit Vitals  /81 (BP 1 Location: Left arm, BP Patient Position: Sitting)  Pulse 89  Temp 97.5 °F (36.4 °C)  Resp 20  
 Ht 5' 9\" (1.753 m)  Wt 229 lb 0.9 oz (103.9 kg)  SpO2 95%  Breastfeeding No  
 BMI 33.83 kg/m2 SpO2 Readings from Last 6 Encounters:  
10/13/18 95% 09/10/18 96% 08/07/18 94% 08/01/18 96% 03/14/18 96% 02/22/18 97% O2 Flow Rate (L/min): 2 l/min Intake/Output Summary (Last 24 hours) at 10/13/18 2987 Last data filed at 10/13/18 7818 Gross per 24 hour Intake              800 ml Output              800 ml Net                0 ml Telemetry reviewed:   afib Tubes:   Vargas, central line X-Ray:  Admission chest xray - moderated CHF Procedures:   Echo - Left ventricle: Ejection fraction was estimated in the range of 15 % to 20 
%. There was diffuse hypokinesis. Mitral valve: There was mild regurgitation. Exam:  
 
General   well developed, well nourished, appears stated age in no respiratory distress Neck   Supple without nodes or mass. No thyromegaly or bruit Respiratory   Anterior rhonchi present Cardiology  RRR without murmur Abdominal  Soft, non-tender, non-distended, positive bowel sounds, no hepatosplenomegaly Extremities  Without LE edema Skin  Normal skin turgor. No rashes or skin ulcers noted Neurological No focal deficit noted Psychological  Alert, oriented x3 Exam otherwise negative Lab Data Reviewed: (see below) Medications Reviewed: (see below) 
 
______________________________________________________________________ Medications:  
 
Current Facility-Administered Medications Medication Dose Route Frequency  potassium chloride (KLOR-CON) packet 20 mEq  20 mEq Oral TID WITH MEALS  influenza vaccine 2018-19 (6 mos+)(PF) (FLUARIX QUAD/FLULAVAL QUAD) injection 0.5 mL  0.5 mL IntraMUSCular PRIOR TO DISCHARGE  enoxaparin (LOVENOX) injection 100 mg  1 mg/kg SubCUTAneous Q24H  amiodarone (CORDARONE) tablet 400 mg  400 mg Oral Q8H  
 conjugated estrogens (PREMARIN) 0.625 mg/gram vaginal cream 0.5 g  0.5 g Vaginal EVERY TU & FRI  metoprolol tartrate (LOPRESSOR) tablet 25 mg  25 mg Oral Q12H  
 insulin lispro (HUMALOG) injection   SubCUTAneous AC&HS  
 glucose chewable tablet 16 g  4 Tab Oral PRN  
 dextrose (D50W) injection syrg 12.5-25 g  12.5-25 g IntraVENous PRN  
 glucagon (GLUCAGEN) injection 1 mg  1 mg IntraMUSCular PRN  
 venlafaxine-SR (EFFEXOR-XR) capsule 150 mg  150 mg Oral BID  magnesium oxide (MAG-OX) tablet 400 mg  400 mg Oral DAILY  pregabalin (LYRICA) capsule 200 mg  200 mg Oral BID  aspirin chewable tablet 81 mg  81 mg Oral DAILY  atorvastatin (LIPITOR) tablet 40 mg  40 mg Oral QHS  sodium chloride (NS) flush 5-10 mL  5-10 mL IntraVENous Q8H  
 sodium chloride (NS) flush 5-10 mL  5-10 mL IntraVENous PRN  
 acetaminophen (TYLENOL) tablet 650 mg  650 mg Oral Q6H PRN  
 docusate sodium (COLACE) capsule 100 mg  100 mg Oral DAILY PRN Lab Review:  
 
Recent Labs 10/13/18 
 3294  10/12/18 
 5411  10/11/18 
 0345 WBC  13.4*  11.5*  11.1* HGB  14.2  13.0  12.2 HCT  45.2  40.6  39.1 PLT  274  282  254 Recent Labs 10/13/18 
 2106  10/12/18 
 0464  10/11/18 
 8966 NA  136  140  142  
K  3.6  3.3*  3.3*  
CL  103  104  105 CO2  23  27  30 GLU  103*  114*  165* BUN  58*  40*  31* CREA  2.13*  1.58*  1.36* CA  8.7  8.2*  8.3*  
MG   --   2.1  2.2 PHOS   --   3.6  3.5 ALB   --    --   2.8* TBILI   --    --   0.5 SGOT   --    --   15 ALT   --    --   15 Lab Results Component Value Date/Time Glucose (POC) 110 (H) 10/13/2018 07:41 AM  
 Glucose (POC) 115 (H) 10/12/2018 09:03 PM  
 Glucose (POC) 98 10/12/2018 05:04 PM  
 Glucose (POC) 127 (H) 10/12/2018 11:14 AM  
 Glucose (POC) 106 (H) 10/12/2018 07:38 AM  
 
No results for input(s): PH, PCO2, PO2, HCO3, FIO2 in the last 72 hours. No results for input(s): INR in the last 72 hours. No lab exists for component: INREXT, INREXT Assessment:  
 
Principal Problem: 
  Acute respiratory failure with hypoxemia ([facility] [address] (10/6/2018) Active Problems: 
  Acute systolic heart failure ([address] (4/4/2014) Type 2 diabetes mellitus with diabetic neuropathy, without long-term current use of insulin ([facility] [address] (6/5/2016) Atrial fibrillation with rapid ventricular response ([facility] [address] (10/7/2018) Hypokalemia (10/7/2018) AYLIN (acute kidney injury) ([facility] [address] (10/13/2018) Overview: Suspect prerenal 
 
 
 
  
Plan:  
 
Risk of deterioration: high 1. Continue O2 2. Continue IV bumex, amiodarone, crt up today, may hold diuretic today 3. Increase oral KCl replacement 4. Cardiology following 5. Follow labs , sat's Care Plan discussed with: Nursing Staff, patient Discussed:  Care Plan Prophylaxis:  Lovenox and SCD's Disposition:  Unknown 
        
___________________________________________________ Attending Physician: Rodriguez Kim MD

## 2018-10-13 NOTE — PROGRESS NOTES
NARs made q 2hours this shift assessment status unchanged . Up to the chair with assistance of 2 tolerated activity better this acaesar Sampson Patient denies needs or c/o.

## 2018-10-14 LAB
ANION GAP SERPL CALC-SCNC: 8 MMOL/L (ref 5–15)
BASOPHILS # BLD: 0 K/UL (ref 0–0.1)
BASOPHILS NFR BLD: 0 % (ref 0–1)
BUN SERPL-MCNC: 61 MG/DL (ref 6–20)
BUN/CREAT SERPL: 29 (ref 12–20)
CALCIUM SERPL-MCNC: 8.6 MG/DL (ref 8.5–10.1)
CHLORIDE SERPL-SCNC: 106 MMOL/L (ref 97–108)
CO2 SERPL-SCNC: 25 MMOL/L (ref 21–32)
CREAT SERPL-MCNC: 2.13 MG/DL (ref 0.55–1.02)
DIFFERENTIAL METHOD BLD: ABNORMAL
EOSINOPHIL # BLD: 0.2 K/UL (ref 0–0.4)
EOSINOPHIL NFR BLD: 2 % (ref 0–7)
ERYTHROCYTE [DISTWIDTH] IN BLOOD BY AUTOMATED COUNT: 18.5 % (ref 11.5–14.5)
GLUCOSE BLD STRIP.AUTO-MCNC: 116 MG/DL (ref 65–100)
GLUCOSE BLD STRIP.AUTO-MCNC: 134 MG/DL (ref 65–100)
GLUCOSE BLD STRIP.AUTO-MCNC: 146 MG/DL (ref 65–100)
GLUCOSE BLD STRIP.AUTO-MCNC: 89 MG/DL (ref 65–100)
GLUCOSE SERPL-MCNC: 119 MG/DL (ref 65–100)
HCT VFR BLD AUTO: 41.2 % (ref 35–47)
HGB BLD-MCNC: 13.3 G/DL (ref 11.5–16)
IMM GRANULOCYTES # BLD: 0.1 K/UL (ref 0–0.04)
IMM GRANULOCYTES NFR BLD AUTO: 1 % (ref 0–0.5)
LYMPHOCYTES # BLD: 3.6 K/UL (ref 0.8–3.5)
LYMPHOCYTES NFR BLD: 29 % (ref 12–49)
MCH RBC QN AUTO: 28.7 PG (ref 26–34)
MCHC RBC AUTO-ENTMCNC: 32.3 G/DL (ref 30–36.5)
MCV RBC AUTO: 89 FL (ref 80–99)
MONOCYTES # BLD: 0.9 K/UL (ref 0–1)
MONOCYTES NFR BLD: 8 % (ref 5–13)
NEUTS SEG # BLD: 7.3 K/UL (ref 1.8–8)
NEUTS SEG NFR BLD: 60 % (ref 32–75)
NRBC # BLD: 0 K/UL (ref 0–0.01)
NRBC BLD-RTO: 0 PER 100 WBC
PLATELET # BLD AUTO: 306 K/UL (ref 150–400)
PMV BLD AUTO: 11.9 FL (ref 8.9–12.9)
POTASSIUM SERPL-SCNC: 3.5 MMOL/L (ref 3.5–5.1)
RBC # BLD AUTO: 4.63 M/UL (ref 3.8–5.2)
SERVICE CMNT-IMP: ABNORMAL
SERVICE CMNT-IMP: NORMAL
SODIUM SERPL-SCNC: 139 MMOL/L (ref 136–145)
WBC # BLD AUTO: 12.2 K/UL (ref 3.6–11)

## 2018-10-14 PROCEDURE — 65660000000 HC RM CCU STEPDOWN

## 2018-10-14 PROCEDURE — 74011250636 HC RX REV CODE- 250/636: Performed by: INTERNAL MEDICINE

## 2018-10-14 PROCEDURE — 74011250637 HC RX REV CODE- 250/637: Performed by: INTERNAL MEDICINE

## 2018-10-14 PROCEDURE — 36415 COLL VENOUS BLD VENIPUNCTURE: CPT | Performed by: INTERNAL MEDICINE

## 2018-10-14 PROCEDURE — 80048 BASIC METABOLIC PNL TOTAL CA: CPT | Performed by: INTERNAL MEDICINE

## 2018-10-14 PROCEDURE — 94760 N-INVAS EAR/PLS OXIMETRY 1: CPT

## 2018-10-14 PROCEDURE — 85025 COMPLETE CBC W/AUTO DIFF WBC: CPT | Performed by: INTERNAL MEDICINE

## 2018-10-14 PROCEDURE — 82962 GLUCOSE BLOOD TEST: CPT

## 2018-10-14 RX ORDER — BUMETANIDE 1 MG/1
1 TABLET ORAL DAILY
Status: DISCONTINUED | OUTPATIENT
Start: 2018-10-14 | End: 2018-10-22 | Stop reason: HOSPADM

## 2018-10-14 RX ORDER — AMIODARONE HYDROCHLORIDE 200 MG/1
400 TABLET ORAL 2 TIMES DAILY
Status: DISCONTINUED | OUTPATIENT
Start: 2018-10-14 | End: 2018-10-18

## 2018-10-14 RX ORDER — POTASSIUM CHLORIDE 750 MG/1
20 TABLET, FILM COATED, EXTENDED RELEASE ORAL
Status: COMPLETED | OUTPATIENT
Start: 2018-10-14 | End: 2018-10-14

## 2018-10-14 RX ORDER — POTASSIUM CHLORIDE 750 MG/1
10 TABLET, FILM COATED, EXTENDED RELEASE ORAL DAILY
Status: DISCONTINUED | OUTPATIENT
Start: 2018-10-14 | End: 2018-10-22 | Stop reason: HOSPADM

## 2018-10-14 RX ADMIN — Medication 10 ML: at 16:58

## 2018-10-14 RX ADMIN — Medication 10 ML: at 05:46

## 2018-10-14 RX ADMIN — Medication 400 MG: at 10:14

## 2018-10-14 RX ADMIN — BUMETANIDE 1 MG: 1 TABLET ORAL at 10:13

## 2018-10-14 RX ADMIN — Medication 10 ML: at 22:21

## 2018-10-14 RX ADMIN — ASPIRIN 81 MG CHEWABLE TABLET 81 MG: 81 TABLET CHEWABLE at 10:13

## 2018-10-14 RX ADMIN — PREGABALIN 200 MG: 100 CAPSULE ORAL at 17:53

## 2018-10-14 RX ADMIN — POTASSIUM CHLORIDE 10 MEQ: 750 TABLET, FILM COATED, EXTENDED RELEASE ORAL at 10:13

## 2018-10-14 RX ADMIN — ATORVASTATIN CALCIUM 40 MG: 40 TABLET, FILM COATED ORAL at 22:21

## 2018-10-14 RX ADMIN — ENOXAPARIN SODIUM 100 MG: 100 INJECTION, SOLUTION INTRAVENOUS; SUBCUTANEOUS at 10:16

## 2018-10-14 RX ADMIN — PREGABALIN 200 MG: 100 CAPSULE ORAL at 10:14

## 2018-10-14 RX ADMIN — POTASSIUM CHLORIDE 20 MEQ: 750 TABLET, FILM COATED, EXTENDED RELEASE ORAL at 12:13

## 2018-10-14 RX ADMIN — VENLAFAXINE HYDROCHLORIDE 150 MG: 150 CAPSULE, EXTENDED RELEASE ORAL at 10:14

## 2018-10-14 RX ADMIN — METOPROLOL TARTRATE 25 MG: 25 TABLET, FILM COATED ORAL at 10:14

## 2018-10-14 RX ADMIN — AMIODARONE HYDROCHLORIDE 400 MG: 200 TABLET ORAL at 05:45

## 2018-10-14 RX ADMIN — METOPROLOL TARTRATE 25 MG: 25 TABLET, FILM COATED ORAL at 22:21

## 2018-10-14 RX ADMIN — AMIODARONE HYDROCHLORIDE 400 MG: 200 TABLET ORAL at 17:53

## 2018-10-14 RX ADMIN — VENLAFAXINE HYDROCHLORIDE 150 MG: 150 CAPSULE, EXTENDED RELEASE ORAL at 17:53

## 2018-10-14 NOTE — PROGRESS NOTES
NARs made q 2 hours this shift assessment status is stable patient has beed able to get oob this morning with minimal assistance. Is sitting up in the chair at present denies needs or c/o.

## 2018-10-14 NOTE — PROGRESS NOTES
Spiritual Care Assessment/Progress Note [address] 
 
 
NAME: Cinthia Felty      MRN: [id number] AGE: 79 y.o. SEX: female [employer]ism Affiliation: Spiritism  
Language: English  
 
10/14/2018     Total Time (in minutes): 15 Spiritual Assessment begun in [facility] through conversation with: 
  
    [x]Patient        [] Family    [] Friend(s) Reason for Consult: Initial/Spiritual assessment, patient floor Spiritual beliefs: (Please include comment if needed) [x] Identifies with a kamron tradition:     
   [x] Supported by a kamron community:        
   [] Claims no spiritual orientation:       
   [] Seeking spiritual identity:            
   [] Adheres to an individual form of spirituality:       
   [] Not able to assess:                   
 
    
Identified resources for coping:  
   [x] Prayer                           
   [] Music                  [] Guided Imagery [x] Family/friends                 [] Pet visits [x] Devotional reading                         [] Unknown 
   [] Other:                                          
 
 
Interventions offered during this visit: (See comments for more details) Patient Interventions: Affirmation of emotions/emotional suffering, Affirmation of kamron, Catharsis/review of pertinent events in supportive environment, Coping skills reviewed/reinforced, Iconic (affirming the presence of God/Higher Power), Initial/Spiritual assessment, patient floor, Normalization of emotional/spiritual concerns, Prayer (assurance of) Plan of Care: 
 
 [] Support spiritual and/or cultural needs  
 [] Support AMD and/or advance care planning process    
 [] Support grieving process 
 [] Coordinate Rites and/or Rituals  
 [] Coordination with community clergy [] No spiritual needs identified at this time 
 [] Detailed Plan of Care below (See Comments)  [] Make referral to Music Therapy 
[] Make referral to Pet Therapy [] Make referral to Addiction services 
[] Make referral to [facility] 
[] Make referral to Spiritual Care Partner 
[] No future visits requested       
[x] Follow up visits as needed Comments: Initial visit with patient on [address] due to length of hospitalization. Provided active listening as patient provided medical history focusing on her recent time in an ICU and her family's concerns for her long-term health. Patient shared that she is waiting to hear from doctors about the next steps and is hopeful that whatever is decided, she will be able to begin moving past her medical issues. Patient has strong family support from her , two daughters, and three grandsons. [facility] of presence and words of encouragement. Patient expressed her appreciation for 's visit and is open to continued support. Advised patient of  availability as needed and desired. Chaplain Maria De Jesus Chong M.Div.  Paging Service [phone])

## 2018-10-14 NOTE — PROGRESS NOTES
Cardiology Progress Note 10/14/2018    Admit Date: 10/6/2018 Admit Diagnosis: Acute respiratory failure (HCC)  CC:  None currently Cardiac Assessment/Plan:  
Ms Miri Hernandez has a h/o: 
1) CAD (LAD ISELA 2014 for NQMI), Neg PET for ischemia 9/2018. 
2) mixed ischemic/tachycardia mediated CM 4/2014 (EF 20%); EF 45% 11/2017. Willistine King was too expensive. 3) HTN, 4) PAfib (RFA 4/2016 and 1/2017), Recurrrent/persistent afib 2017/2018 5) DM,  
6) SHAYLA (on CPAP),  
7) CKD (Cr 1.5 range). Aldactone stopped in 2014. 8) [facility] Don PPM 7/2014 for bradycardia while on therapy for AFib. CAD: LAD ISELA 2014; recent PET w/o ischemia but recurrent low EF: if not improved with med Rx, may need repeat cath. CM: Echo 10/9/18: EF 15-20%. If cant diurese well, may need milrinone. Eventually will need to get back on ACEi. HTN: off metoprolol/lisinopril with low BPs after ETT/sedation: Levophed off since evening of 10/7; change to prn Jose: restarted BBlocker Rhythm: PAFib; recurrent; remains on IV amio; Dr Juana Garcia to see (considering AV node ablation/BiV PM or ICD). Cont anticoagulation. Renal function: Cr stable in 1.5 range; (Cr 1.3 range typically). Resp failure, SHAYLA, DM, Dispo: per priam team.  
For other plans, see orders. 10/10:  Remains in AFib. Extubated. Stridor resolved. Labs stable. Start metoprolol 25mg BID; eventual change to Toprol XL. Eventual resumption of ACEi/ARB. Continue amio; bolus this am.  Per Dr. Juana Garcia \"She'll need optimization of her heart failure status in the shortterm, not in good condition to undergo what I'd recommend. I think she'll need a BIV-ICD upgrade and AV node ablation ultimately for definitive management of her cardiomyopathy, heart failure, and tachycardia problem. \" 
 
10/11:  Mentation improved. Breathing better. Remains in AFib; rates controlled. K low; replete.   Continue metoprolol as tolerated; BPs marginal today. Will change to oral amio 400mg TID and stop IV gtt. Change lovenox back to pradaxa 150mg BID. Transfer to tele today. 10/12: On room air, cr increasing, will change IV bumex to PO, will change pradaxa back to lovenox. Dr. Daly Winn to evaluate on Tues regarding timing of AV node ablation, biV upgrade. 10/13: On room air, cr increased to 2, bumex currently on hold, will need to restart at some point. 10/14: On Room air, cr still at 2, bumex PO restarted, amiodarone decreaesed from TID to bid, HR improved. 
 
@ OV 10/2/18: 
Ms Mckenzie Nevarez has a h/o: 
1) CAD (LAD ISELA 2014 for NQMI), Neg PET for ischemia 9/2018. 
2) mixed ischemic/tachycardia mediated CM 4/2014 (EF 20%); EF 45% 11/2017. Ivv Kos was too expensive. 3) HTN, 4) PAfib (RFA 4/2016 and 1/2017), Recurrrent/persistent afib 2017/2018 5) DM,  
6) SHAYLA (on CPAP),  
7) CKD (Cr 1.5 range). Aldactone stopped in 2014. 8) St Don PPM 7/2014 for bradycardia while on therapy for AFib. 
 
11/2017: No CP/STEEN; Back in afib today:  Coreg changed Toprol-XL. 10/2018:  Recent ER visit with epigastric pain; negative evaluation there & seen by Dr. Elio Castano who set up a cardiac PET. The PET shows no ischemia but EF suggested at 16%. Of note patient does have AFib with accelerated ventricular response. No recurrence of epigastric discomfort. No bleeding. Had a simple trip at home a few weeks ago without injury. IMPRESSION AND PLAN 
  
01. (I25.10) Atherosclerotic heart disease of native coronary artery without angina pectoris:  No anginal symptoms. Cont asa and stopped plavix with need for anticoagulation. We discussed the signs and symptoms of unstable angina, myocardial infarction and malignant arrhythmia. The patient knows to seek immediate medical attention should they occur. ECG done today 02. (I42.0) Dilated cardiomyopathy:  Mixed ischemic/non-ischemic (tachycardia mediated) CM.   Her ejection fraction is greater than 35% after repeat echo. The patient's systolic function has been stable. I suspect the PET has a falsely low EF related to her AFib but repeat echo is needed. 2-D w/CFD Echo to be done first available. 03. (I50.42) Chronic combined systolic (congestive) and diastolic (congestive) heart failure:  Resolved exertional symptoms. (NYHA I)  The patient is compliant with their CHF regimen. 04. (I48.1) Persistent atrial fibrillation:  Management per Dr Zoya Ruiz; currently off amio and on Pradaxa. Heart rate has been to elevated; increase Toprol  q.day. Further AFib monitoring/therapy per Dr. Eugenia Patterson. 05. (I13.0) Hyp hrt & chr kdny dis w hrt fail and stg 1-4/unsp chr kdny:  Adequately controlled. We will see how the patient does with the med changes noted above. 06. (E78.2) Mixed hyperlipidemia:  Lipid labs drawn by PCP. The patient is tolerating lipid lowering therapy well. 07. (N18.3) Chronic kidney disease, stage 3 (moderate):  Cr 1.24/GFR46 11/2015. Cr 1.32/GFR 43 1/2017.  
08. (R60.0) Localized edema:  Resolved. She avoids salt. 09. (E11.69) Type 2 diabetes mellitus with other specified complication:  Lipids & HTN as noted above; DM managed by other MD.  
10. (Z95.0) Presence of cardiac pacemaker:  will continue to be followed in device clinic. 11. (Z79.82) Long term (current) use of aspirin:  This condition is stable. 12. (Z79.01) Long term (current) use of anticoagulants: This condition is stable. Indicated for atrial fibrillation. No bleeding. If she has recurrent falls, she knows to call for re-evaluation of anticoagulation. 13. (Z68.38) Body mass index (BMI) 38.0-38.9, adult:  The patient was instructed on AHA diet and regular exercise. ORDERS: 
    
1 ECG done today 2 2-D w/ CFD Echocardiogram first available. 3 Return office visit with Willy Little MD in 6 Months. 4 The patient was instructed on AHA diet and regular exercise. 10/2/18 MEDICATION LIST Medication Sig Description  
amlodipine 5 mg tablet take 1 tablet by oral route  every day per pc  
aspirin 81 mg tablet,delayed release take 1 tablet by oral route  every day  
atorvastatin 40 mg tablet take 1 tablet by oral route  every evening  
bumetanide 1 mg tablet 1 in am on tues&thursday 2 tablets on mon wed fridays Calcium 600 600 mg (1,500 mg) tablet daily  
clonazepam 0.5 mg tablet take 1 tablet by oral route  every day LISINOPRIL 20 MG Tablet TAKE 1 TABLET EVERY DAY Lyrica 200 mg capsule take 1 capsule by oral route 2 times every day  
magnesium 250 mg tablet take 1 tablet by oral route  every day  
metoprolol succinate  mg tablet,extended release 24 hr take 1 tablet by oral route  every day  
potassium gluconate 500 mg (83 mg) tablet daily Pradaxa 150 mg capsule take 1 capsule by oral route 2 times every day Premarin 0.625 mg/gram vaginal cream insert 0.5 applicatorful by vaginal route  every day cyclically, 3 weeks on and 1 week off  
venlafaxine 75 mg tablet 2 po bid Vitamin D3 1,000 unit capsule take 1 daily  
 
____________________________________________________________________________________________________ CARDIAC HISTORY 
CAD: 
   
1 NSTEMI [95% Proximal LAD, Resolute (ISELA) to the Proximal LAD.] - 4/8/2014 CHF/CM: 
   
1 Mixed ischemic/tachycardic CM. [Nl TSH.] - 4/2014  
2 Ischemic & tachycardic Cardiomyopathy [Echo (EF 0.45) - 06/17/2014, (prev EF 20-30%)] - 6/2014 ARRHYTHMIA: 
   
1 A Fib [DCC, Plan: amio started; Eliquis with Effient (change eliquis to asa if NSR after 30 days). ] - 4/8/2014  
2 PAF - 8/2010  
3 A Fib, recurrent; and 6/2014. [Had marked sinus bradycardia while on bblocker/dig.] - 5/2014  
4 Sinus Bradycardia. - 6/2/2014  
5 PPM (Devices (Dual Chamber PPM (Rosalba Lindsey)) - 7/17/2014) - 7/17/2014  
6 PAF [CVR; Eliquis restarted (plavix stopped). ] - 9/2015  
7 A Fib [RFA] - 4/2016  
8 A Fib [RFA] - 1/2017 RISK FACTORS: 
   
 1 Diabetes [Diagnosed in 2014] 2 Hypertension 3 Dyslipidemia CARDIOVASCULAR PROCEDURES Procedure Date Results Cardiac PET 09/24/2018 EF 0.16 (16%), physiologic apical thinning with no ischemia Cath 04/08/2014 95% Proximal LAD, Resolute (ISELA) to the Proximal LAD. DCCV 04/08/2014 Initial Rhythm A Fib, Final Rhythm Sinus Devices 07/17/2014 Dual Chamber PPM (Adine Kelvin) Echo 11/28/2017 EF 0.45 (45%), Mild Global Hypo, Mild TR, Mild LVH, Mild MR, Est RVSP 41 mmHg, Normal RV. (probable trileaflet AoV). Echo 10/30/2015 EF 0.45 (45%), Mild LVH, LA Diam 4.1 cm, Est RVSP 35 mmHg, Normal RV. ?bicuspid AoV; (Prob trileaflet on previous ANGELITO). Echo 06/17/2014 EF 0.45 (45%), EF range 45%-50%, Mild LV dilatation. Mild LV systolic dysfunction. ? Bicuspid AoV but prob trileafet on previous ANGELITO. Echo 04/03/2014 EF 0.20 (20%), global HK with lateral sparing; no valve disease. EKG 10/02/2018 Atrial Fib, ; nonspecific T-wave flattening. Holter 06/09/2014 No Malignant Arrhythmias, Atrial Fib, No correlation with symptoms, (, [address] Holter 04/30/2014 No Malignant Arrhythmias, Atrial Fib, No correlation with symptoms, \"light or heavy chest\" = afib in 60s. HR  (ave 63). ASx pauses (upto 2.8) while asleep. RFA 01/19/2017 Indication A Fib, Final Rhythm Sinus RFA  Indication A Fib Sleep Study  OSAS: Moderate ANGELITO 04/08/2014 EF 0.35 (35%), No BRAYDON Clot, prob trileaflet AoV. ACTIVE ALLERGIES: 
Ingredient Reaction Comment SACUBITRIL Marshall Galilea is too expensive SULFA (SULFONAMIDE ANTIBIOTICS) AMOXICILLIN TRIHYDRATE    
POTASSIUM CLAVULANATE ACTIVE INTOLERANCES: 
Description Reaction Comment SACUBITRIL Marshall Galilea is too expensive PROBLEM LIST: 
Problem Description Chronic Benign essential HTN Y  
DM type 2 N A-fib Y  
CAD Y Mixed hyperlipidemia Y  
 
 
PAST MEDICAL/SURGICAL HISTORY  (Detailed) Disease/disorder Onset Date Management Date Comments hysterectomy A-FIB Cardiovascular disease CHF Hyperlipidemia Hypertension SHAYLA: moderate. 2014 Family History  (Detailed) Relationship Family Member Name  Age at Death Condition Onset Age Cause of Death Brother    Hypertension  N Mother    PAD  N Mother    Hypertension  N Mother    Cardiac arrhythmias  N Sister    Diabetes mellitus  N  
 
SOCIAL HISTORY  (Detailed) Preferred language is [place]. EDUCATION/EMPLOYMENT/OCCUPATION Employment History Status Retired Restrictions  
  retired    
  retired MARITAL STATUS/FAMILY/SOCIAL SUPPORT Currently . High complexity decision making was performed  X Yes High-risk of decompensation with multiple organ involvement X Yes Hospital problem list  
Active Hospital Problems Diagnosis Date Noted  AYLIN (acute kidney injury) ([address] 10/13/2018 Suspect prerenal 
  
 Atrial fibrillation with rapid ventricular response ([address] 10/07/2018  Hypokalemia 10/07/2018  Acute respiratory failure with hypoxemia ([address] 10/06/2018  Type 2 diabetes mellitus with diabetic neuropathy, without long-term current use of insulin ([address] 2016  Acute systolic heart failure ([address] 2014 Subjective:  Patient reports  []   nothing; unable to communicate    [x]  just intubated Chest pain x none  consistent with:  Non-cardiac CP Atypical CP None now  On going  Anginal CP Dyspnea  none  at rest  with exertion    
   x improved  unchanged  worse PND x none  overnight Orthopnea x none  improved  unchanged  worse Presyncope x none  improved  unchanged  worse Ambulated in hallway without symptoms   Yes Ambulated in room without symptoms  Yes ROS Hematuria:  Yes X No Dysuria:  Yes X No  
            
(2+  other systems) Cough: Yes X No Sputum: Yes X No  
            
 BRBPR:  Yes X No Melena: Yes X No  
No change in family and social history from H&P/Consult note. Objective:  
 Physical Exam: 
24 hr VS reviewed, overall VSSAF Temp (24hrs), Av.6 °F (36.4 °C), Min:97.2 °F (36.2 °C), Max:97.9 °F (36.6 °C) Patient Vitals for the past 8 hrs: 
 Pulse 10/14/18 1125 88  
10/14/18 0846 91  
10/14/18 0659 67  
10/14/18 0335 74 Patient Vitals for the past 8 hrs: 
 Resp 10/14/18 1125 20  
10/14/18 0846 22  
10/14/18 0659 20  
10/14/18 0335 20 Patient Vitals for the past 8 hrs: 
 BP  
10/14/18 1125 110/60  
10/14/18 0846 (!) 142/113  
10/14/18 0659 97/74  
10/14/18 0335 112/82 Intake/Output Summary (Last 24 hours) at 10/14/18 1127 Last data filed at 10/13/18 1508 Gross per 24 hour Intake              240 ml Output              250 ml Net              -10 ml General: x WD,WN; obese  Elderly  Cachetic x NAD Agitated  Lethargic  Arousable  Obtunded Sedated  On Bipap  Intubated ENT/Palate: X WNL  Dry MM  anicteric Respiratory: X Wheeze; ?stridor  Nl resp effort x Increased effort  No significant change  
 x rhonchi  rales  improved  worse Cardiovasc:  RRR x IRRR X Nl S1, S2 x No rub No murmur X No new murmur  Murmur c/w: x No gallop  
 x Min edema  BLE edema:+  RLE edema:+  LLE edema:+ Edema less  Edema more  Edema same  Edema worse X Nl JVP  Elevated JVP  JVP same  JVP worse X Carotid wnl x abd aorta not palpated X no peripheral emboli noted GI: X abd soft x nondistended X BS present X No organo- megaly noted Skin: X Warm, dry  Cold extremites Neuro: x A/seems O  Grossly non- focal  Obtunded  Sedated Lethargic  Arousable  intubated    
 
cath site intact w/o hematoma or new bruit; distal pulse unchanged  Yes Data Review:    
Telemetry independently reviewed  sinus  chronic afib x parox afib  NSVT  
 
ECG independently reviewed  NSR  afib  no significant changes  NSST-Tw chgs  
x no new ECG provided for review Lab results reviewed as noted below. Current medications reviewed as noted below. No results for input(s): PH, PCO2, PO2 in the last 72 hours. No results for input(s): CPK, CKMB, CKNDX, TROIQ in the last 72 hours. Recent Labs 10/14/18 
 5439  10/13/18 
 8736  10/12/18 
 5607  10/12/18 
 5422 NA  139  136   --   140  
K  3.5  3.6   --   3.3*  
CL  106  103   --   104 CO2  25  23   --   27 BUN  61*  58*   --   40* CREA  2.13*  2.13*   --   1.58* GFRAA  28*  28*   --   40* GLU  119*  103*   --   114* PHOS   --    --    --   3.6 CA  8.6  8.7   --   8.2* WBC  12.2*  13.4*  11.5*   --   
HGB  13.3  14.2  13.0   --   
HCT  41.2  45.2  40.6   --   
PLT  306  274  282   -- No results for input(s): SGOT, GPT, AP, TBIL, TBILI, TP, ALB, GLOB, GGT, AML, LPSE in the last 72 hours. No lab exists for component: AMYP, HLPSE No results for input(s): INR, PTP, APTT in the last 72 hours. No lab exists for component: INREXT, INREXT No results for input(s): FE, TIBC, PSAT, FERR in the last 72 hours. Lab Results Component Value Date/Time Glucose (POC) 134 (H) 10/14/2018 08:23 AM  
 Glucose (POC) 126 (H) 10/13/2018 09:00 PM  
 Glucose (POC) 119 (H) 10/13/2018 04:58 PM  
 Glucose (POC) 108 (H) 10/13/2018 12:19 PM  
 Glucose (POC) 110 (H) 10/13/2018 07:41 AM  
 
 
Current Facility-Administered Medications Medication Dose Route Frequency  bumetanide (BUMEX) tablet 1 mg  1 mg Oral DAILY  potassium chloride SR (KLOR-CON 10) tablet 10 mEq  10 mEq Oral DAILY  amiodarone (CORDARONE) tablet 400 mg  400 mg Oral BID  potassium chloride SR (KLOR-CON 10) tablet 20 mEq  20 mEq Oral NOW  influenza vaccine 2018-19 (6 mos+)(PF) (FLUARIX QUAD/FLULAVAL QUAD) injection 0.5 mL  0.5 mL IntraMUSCular PRIOR TO DISCHARGE  enoxaparin (LOVENOX) injection 100 mg  1 mg/kg SubCUTAneous Q24H  
 conjugated estrogens (PREMARIN) 0.625 mg/gram vaginal cream 0.5 g  0.5 g Vaginal EVERY TU & FRI  metoprolol tartrate (LOPRESSOR) tablet 25 mg  25 mg Oral Q12H  
 insulin lispro (HUMALOG) injection   SubCUTAneous AC&HS  
 glucose chewable tablet 16 g  4 Tab Oral PRN  
 dextrose (D50W) injection syrg 12.5-25 g  12.5-25 g IntraVENous PRN  
 glucagon (GLUCAGEN) injection 1 mg  1 mg IntraMUSCular PRN  
 venlafaxine-SR (EFFEXOR-XR) capsule 150 mg  150 mg Oral BID  magnesium oxide (MAG-OX) tablet 400 mg  400 mg Oral DAILY  pregabalin (LYRICA) capsule 200 mg  200 mg Oral BID  aspirin chewable tablet 81 mg  81 mg Oral DAILY  atorvastatin (LIPITOR) tablet 40 mg  40 mg Oral QHS  sodium chloride (NS) flush 5-10 mL  5-10 mL IntraVENous Q8H  
 sodium chloride (NS) flush 5-10 mL  5-10 mL IntraVENous PRN  
 acetaminophen (TYLENOL) tablet 650 mg  650 mg Oral Q6H PRN  
 docusate sodium (COLACE) capsule 100 mg  100 mg Oral DAILY PRN Claus Falcon MD

## 2018-10-14 NOTE — PROGRESS NOTES
Post Fall Documentation Velma Dickens witnessed/unwitnessed fall occurred on 10/14/2018 (Date) at (Time). The answers to the following questions summarize the fall: · In the patient's own words,: 
· What was he/she doing when he/she fell? · What are his/her complaints? · Nurse: · Document observation, treatment, conversation, follow-up, and patient response. · What was the patient's condition when found (i.e., pain, symptoms, cuts, bruises)? · What specific complaints did the patient have? · What did the staff do when patient was found (i.e., vital signs, returned to bed with fall alarm, side rails up)? · Which physician was notified? [address]

## 2018-10-14 NOTE — ROUTINE PROCESS
Bedside report provided by Sameera Tobin RN. SBAR report, Kardex, Cardiac Rhythm (Afib with occasional pacing, rate controlled), and pt condition with plan of care reviewed. Pt s/p fall today--no injury noted. Pt currently lying in bed. Safety measures reinforced.

## 2018-10-14 NOTE — PROGRESS NOTES
[address] [place] [address] 
[phone] Medical Progress Note NAME: Danie Kaye :  1950 MRM:  [id number] Date/Time: 10/14/2018  8:02 AM 
  
Subjective:  
 
Admitted with acute respiratory failure with hypoxemia due to systolic CHF associated with atrial fib with RVR. Bedside EF 15-20%. Chest xray showed moderate CHF. Respiratory failure rapidly progressed with subsequent intubation. Patient on pressors for short period of time. Afib controlled with amiodarone with exacerbations due to hypokalemia related to diuresis. Successfully extubated  complicated by stridor. Patient currently awake, sitting in chair, speech better and oriented x3. Generally weak but otherwise denies SOB/PND/orthopnea/edema. Past Medical History:  
Diagnosis Date  Anxiety 2018  Arthritis OA  Asthma  Diabetes ([facility]  Essential hypertension  GERD (gastroesophageal reflux disease)  Hypercholesterolemia 2018  Hypertension  Long-term use of high-risk medication 2018  Neuropathy  Other ill-defined conditions(799.89) IBS, spinal stenosis  Plantar fasciitis  Psychiatric disorder   
 depression, anxiety  Sleep apnea   
 uses CPAP  Type 2 diabetes mellitus with diabetic neuropathy, without long-term current use of insulin ([facility] [facility] 2016 ROS:  General: postive for weakness Respiratory:  positive for cough, congestion Cardiology:  negative for chest pain, palpitations, 
Gastrointestinal: negative for abdominal pain, N/V Muskuloskeletal : negative for arthralgia, myalgia Neurological: negative for headache, dizziness or focal deficits Psychological: positive for less anxiety Review of systems otherwise negative Objective:  
 
 
Vitals:  
 
  
Last 24hrs VS reviewed since prior progress note. Most recent are: 
 
Visit Vitals  BP 97/74 (BP 1 Location: Right arm)  Pulse 67  
  Temp 97.2 °F (36.2 °C)  Resp 20  
 Ht 5' 9\" (1.753 m)  Wt 229 lb 4.5 oz (104 kg)  SpO2 96%  Breastfeeding No  
 BMI 33.86 kg/m2 SpO2 Readings from Last 6 Encounters:  
10/14/18 96% 09/10/18 96% 08/07/18 94% 08/01/18 96% 03/14/18 96% 02/22/18 97% O2 Flow Rate (L/min): 2 l/min Intake/Output Summary (Last 24 hours) at 10/14/18 3069 Last data filed at 10/13/18 1508 Gross per 24 hour Intake              360 ml Output              250 ml Net              110 ml Telemetry reviewed:   afib Tubes:   Vargas, central line X-Ray:  Admission chest xray - moderated CHF Procedures:   Echo - Left ventricle: Ejection fraction was estimated in the range of 15 % to 20 
%. There was diffuse hypokinesis. Mitral valve: There was mild regurgitation. Exam:  
 
General   well developed, well nourished, appears stated age in no respiratory distress Neck   Supple without nodes or mass. No thyromegaly or bruit Respiratory   Anterior rhonchi present Cardiology  RRR without murmur Abdominal  Soft, non-tender, non-distended, positive bowel sounds, no hepatosplenomegaly Extremities  Without LE edema Skin  Normal skin turgor. No rashes or skin ulcers noted Neurological No focal deficit noted Psychological  Alert, oriented x3 Exam otherwise negative Lab Data Reviewed: (see below) Medications Reviewed: (see below) 
 
______________________________________________________________________ Medications:  
 
Current Facility-Administered Medications Medication Dose Route Frequency  influenza vaccine 2018-19 (6 mos+)(PF) (FLUARIX QUAD/FLULAVAL QUAD) injection 0.5 mL  0.5 mL IntraMUSCular PRIOR TO DISCHARGE  enoxaparin (LOVENOX) injection 100 mg  1 mg/kg SubCUTAneous Q24H  
 amiodarone (CORDARONE) tablet 400 mg  400 mg Oral Q8H  
 conjugated estrogens (PREMARIN) 0.625 mg/gram vaginal cream 0.5 g  0.5 g Vaginal EVERY TU & FRI  
  metoprolol tartrate (LOPRESSOR) tablet 25 mg  25 mg Oral Q12H  
 insulin lispro (HUMALOG) injection   SubCUTAneous AC&HS  
 glucose chewable tablet 16 g  4 Tab Oral PRN  
 dextrose (D50W) injection syrg 12.5-25 g  12.5-25 g IntraVENous PRN  
 glucagon (GLUCAGEN) injection 1 mg  1 mg IntraMUSCular PRN  
 venlafaxine-SR (EFFEXOR-XR) capsule 150 mg  150 mg Oral BID  magnesium oxide (MAG-OX) tablet 400 mg  400 mg Oral DAILY  pregabalin (LYRICA) capsule 200 mg  200 mg Oral BID  aspirin chewable tablet 81 mg  81 mg Oral DAILY  atorvastatin (LIPITOR) tablet 40 mg  40 mg Oral QHS  sodium chloride (NS) flush 5-10 mL  5-10 mL IntraVENous Q8H  
 sodium chloride (NS) flush 5-10 mL  5-10 mL IntraVENous PRN  
 acetaminophen (TYLENOL) tablet 650 mg  650 mg Oral Q6H PRN  
 docusate sodium (COLACE) capsule 100 mg  100 mg Oral DAILY PRN Lab Review:  
 
Recent Labs 10/14/18 
 4710  10/13/18 
 0325  10/12/18 
 3107 WBC  12.2*  13.4*  11.5* HGB  13.3  14.2  13.0 HCT  41.2  45.2  40.6 PLT  306  274  282 Recent Labs 10/14/18 
 3003  10/13/18 
 0325  10/12/18 
 7629 NA  139  136  140  
K  3.5  3.6  3.3*  
CL  106  103  104 CO2  25  23  27 GLU  119*  103*  114* BUN  61*  58*  40* CREA  2.13*  2.13*  1.58* CA  8.6  8.7  8.2* MG   --    --   2.1 PHOS   --    --   3.6 Lab Results Component Value Date/Time Glucose (POC) 126 (H) 10/13/2018 09:00 PM  
 Glucose (POC) 119 (H) 10/13/2018 04:58 PM  
 Glucose (POC) 108 (H) 10/13/2018 12:19 PM  
 Glucose (POC) 110 (H) 10/13/2018 07:41 AM  
 Glucose (POC) 115 (H) 10/12/2018 09:03 PM  
 
No results for input(s): PH, PCO2, PO2, HCO3, FIO2 in the last 72 hours. No results for input(s): INR in the last 72 hours. No lab exists for component: INREXT, INREXT Assessment:  
 
Principal Problem: 
  Acute respiratory failure with hypoxemia ([facility] [address] (10/6/2018) Active Problems: Acute systolic heart failure ([facility] [facility] (4/4/2014) Type 2 diabetes mellitus with diabetic neuropathy, without long-term current use of insulin ([facility] [facility] (6/5/2016) Atrial fibrillation with rapid ventricular response ([facility] (10/7/2018) Hypokalemia (10/7/2018) AYLIN (acute kidney injury) ([facility] [facility] (10/13/2018) Overview: Suspect prerenal 
 
 
 
  
Plan:  
 
Risk of deterioration: moderate 1. Continue O2 2. Continue IV bumex, amiodarone, crt up today, resume bumex once daily 3. KCl replacement, po mag 4. Cardiology following 5. Follow labs , sat's Care Plan discussed with: Nursing Staff, patient Discussed:  Care Plan Prophylaxis:  Lovenox and SCD's Disposition:  Unknown 
        
___________________________________________________ Attending Physician: Sylvia Potter MD

## 2018-10-14 NOTE — PROGRESS NOTES
0700: Bedside report received from [facility] [address]: Aline Lucia unwitnessed fall occurred on 10/14/2018 @ 0850. 
  
The answers to the following questions summarize the fall:  
  
· In the patient's own words,: 
· What was he/she doing when he/she fell? · \"trying to get to the commode\" 
  
· What are his/her complaints? · none 
  
· Nurse: · Document observation, treatment, conversation, follow-up, and patient response. · Pt observed in no apparent distress, no c/o pain, VSS, responded well to getting back in chair. 
  
· What was the patient's condition when found (i.e., pain, symptoms, cuts, bruises)? · Found on floor in front of chair. No c/o pain, denies hitting head, knees, elbows, no visible bruises/cuts 
  
· What specific complaints did the patient have? · none 
  
· What did the staff do when patient was found (i.e., vital signs, returned to bed with fall alarm, side rails up)? · Called code yellow · Check VS 
· Assisted pt back to chair with chair alarm on 
  
· Which physician was notified? · Dr. Kelly Chavez Ricarda 1525: Dressing from central line removal changed. 1900:  
Bedside shift change report given to Tammie Gilliam RN (oncoming nurse). Report included the following information SBAR, Kardex, Intake/Output, MAR, Recent Results and Cardiac Rhythm Paced/Afib. SHIFT SUMMARY: 
 
 
 
 
 
[address] [facility] [place]URSING NOTE Admission Date 10/6/2018 Admission Diagnosis Acute respiratory failure ([facility] [address] Consults IP CONSULT TO CARDIOLOGY Cardiac Monitoring [x] Yes [] No  
  
Purposeful Hourly Rounding [x] Yes   
Ananya Score Total Score: 5 Ananya score 3 or > [x] Bed Alarm [] Avasys [] 1:1 sitter [] Patient refused (Signed refusal form in chart) Preet Score Preet Score: 20 Preet score 14 or < [] PMT consult [] Wound Care consult  
 []  Specialty bed  [] Nutrition consult Influenza Vaccine Received Flu Vaccine for Current Season (usually Sept-March):  No  
 Patient/Guardian Refused (Notify MD): No  
  
Oxygen needs? [x] Room air Oxygen @  []1L    []2L    []3L   []4L    []5L   []6L via NC Chronic home O2 use? [] Yes [] No 
Perform O2 challenge test and document in progress note using smartphrase (.Homeoxygen) Last bowel movement Last Bowel Movement Date: 10/13/18 Urinary Catheter [REMOVED] Urinary Catheter 10/06/18 Vargas-Indications for Use: Accurate measurement of urinary output [REMOVED] External Female Catheter 10/10/18-Urine Output (mL): 0 ml [REMOVED] External Female Catheter 10/12/18-Urine Output (mL): 500 ml [REMOVED] Urinary Catheter 10/06/18 Vargas-Urine Output (mL): 200 ml LDAs Peripheral IV 10/12/18 Right Antecubital (Active) Site Assessment Clean, dry, & intact 10/14/2018  3:59 PM  
Phlebitis Assessment 0 10/14/2018  3:59 PM  
Infiltration Assessment 0 10/14/2018  3:59 PM  
Dressing Status Clean, dry, & intact 10/14/2018  3:59 PM  
Dressing Type Transparent 10/14/2018  3:59 PM  
Hub Color/Line Status Pink;Flushed 10/14/2018  3:59 PM  
Alcohol Cap Used Yes 10/12/2018 10:24 AM  
                  
  
Readmission Risk Assessment Tool Score High Risk   
      
 27 Total Score 3 Has Seen PCP in Last 6 Months (Yes=3, No=0) 2 . Living with Significant Other. Assisted Living. LTAC. SNF. or  
Rehab  
 3 Patient Length of Stay (>5 days = 3)  
 5 Pt. Coverage (Medicare=5 , Medicaid, or Self-Pay=4) 14 Charlson Comorbidity Score (Age + Comorbid Conditions) Criteria that do not apply:  
 IP Visits Last 12 Months (1-3=4, 4=9, >4=11) Expected Length of Stay 4d 2h Actual Length of Stay 8

## 2018-10-15 ENCOUNTER — PATIENT OUTREACH (OUTPATIENT)
Dept: CARDIOLOGY CLINIC | Age: 68
End: 2018-10-15

## 2018-10-15 LAB
ANION GAP SERPL CALC-SCNC: 6 MMOL/L (ref 5–15)
BASOPHILS # BLD: 0 K/UL (ref 0–0.1)
BASOPHILS NFR BLD: 0 % (ref 0–1)
BUN SERPL-MCNC: 56 MG/DL (ref 6–20)
BUN/CREAT SERPL: 33 (ref 12–20)
CALCIUM SERPL-MCNC: 8.4 MG/DL (ref 8.5–10.1)
CHLORIDE SERPL-SCNC: 110 MMOL/L (ref 97–108)
CO2 SERPL-SCNC: 26 MMOL/L (ref 21–32)
CREAT SERPL-MCNC: 1.72 MG/DL (ref 0.55–1.02)
DIFFERENTIAL METHOD BLD: ABNORMAL
EOSINOPHIL # BLD: 0.3 K/UL (ref 0–0.4)
EOSINOPHIL NFR BLD: 3 % (ref 0–7)
ERYTHROCYTE [DISTWIDTH] IN BLOOD BY AUTOMATED COUNT: 18 % (ref 11.5–14.5)
GLUCOSE BLD STRIP.AUTO-MCNC: 103 MG/DL (ref 65–100)
GLUCOSE BLD STRIP.AUTO-MCNC: 110 MG/DL (ref 65–100)
GLUCOSE BLD STRIP.AUTO-MCNC: 130 MG/DL (ref 65–100)
GLUCOSE BLD STRIP.AUTO-MCNC: 81 MG/DL (ref 65–100)
GLUCOSE SERPL-MCNC: 119 MG/DL (ref 65–100)
HCT VFR BLD AUTO: 36.3 % (ref 35–47)
HGB BLD-MCNC: 11.7 G/DL (ref 11.5–16)
IMM GRANULOCYTES # BLD: 0.1 K/UL (ref 0–0.04)
IMM GRANULOCYTES NFR BLD AUTO: 1 % (ref 0–0.5)
LYMPHOCYTES # BLD: 2.7 K/UL (ref 0.8–3.5)
LYMPHOCYTES NFR BLD: 27 % (ref 12–49)
MCH RBC QN AUTO: 29 PG (ref 26–34)
MCHC RBC AUTO-ENTMCNC: 32.2 G/DL (ref 30–36.5)
MCV RBC AUTO: 89.9 FL (ref 80–99)
MONOCYTES # BLD: 0.7 K/UL (ref 0–1)
MONOCYTES NFR BLD: 7 % (ref 5–13)
NEUTS SEG # BLD: 6.1 K/UL (ref 1.8–8)
NEUTS SEG NFR BLD: 62 % (ref 32–75)
NRBC # BLD: 0 K/UL (ref 0–0.01)
NRBC BLD-RTO: 0 PER 100 WBC
PLATELET # BLD AUTO: 216 K/UL (ref 150–400)
PMV BLD AUTO: 11.8 FL (ref 8.9–12.9)
POTASSIUM SERPL-SCNC: 3.8 MMOL/L (ref 3.5–5.1)
RBC # BLD AUTO: 4.04 M/UL (ref 3.8–5.2)
SERVICE CMNT-IMP: ABNORMAL
SERVICE CMNT-IMP: NORMAL
SODIUM SERPL-SCNC: 142 MMOL/L (ref 136–145)
WBC # BLD AUTO: 9.9 K/UL (ref 3.6–11)

## 2018-10-15 PROCEDURE — 80048 BASIC METABOLIC PNL TOTAL CA: CPT | Performed by: INTERNAL MEDICINE

## 2018-10-15 PROCEDURE — 85025 COMPLETE CBC W/AUTO DIFF WBC: CPT | Performed by: INTERNAL MEDICINE

## 2018-10-15 PROCEDURE — 94760 N-INVAS EAR/PLS OXIMETRY 1: CPT

## 2018-10-15 PROCEDURE — 74011250636 HC RX REV CODE- 250/636: Performed by: INTERNAL MEDICINE

## 2018-10-15 PROCEDURE — 74011250637 HC RX REV CODE- 250/637: Performed by: INTERNAL MEDICINE

## 2018-10-15 PROCEDURE — 65660000000 HC RM CCU STEPDOWN

## 2018-10-15 PROCEDURE — 36415 COLL VENOUS BLD VENIPUNCTURE: CPT | Performed by: INTERNAL MEDICINE

## 2018-10-15 PROCEDURE — 82962 GLUCOSE BLOOD TEST: CPT

## 2018-10-15 RX ORDER — METOPROLOL SUCCINATE 50 MG/1
50 TABLET, EXTENDED RELEASE ORAL DAILY
Status: DISCONTINUED | OUTPATIENT
Start: 2018-10-15 | End: 2018-10-19

## 2018-10-15 RX ORDER — LISINOPRIL 5 MG/1
2.5 TABLET ORAL DAILY
Status: DISCONTINUED | OUTPATIENT
Start: 2018-10-15 | End: 2018-10-22 | Stop reason: HOSPADM

## 2018-10-15 RX ADMIN — BUMETANIDE 1 MG: 1 TABLET ORAL at 09:03

## 2018-10-15 RX ADMIN — ENOXAPARIN SODIUM 100 MG: 100 INJECTION, SOLUTION INTRAVENOUS; SUBCUTANEOUS at 09:02

## 2018-10-15 RX ADMIN — AMIODARONE HYDROCHLORIDE 400 MG: 200 TABLET ORAL at 17:23

## 2018-10-15 RX ADMIN — VENLAFAXINE HYDROCHLORIDE 150 MG: 150 CAPSULE, EXTENDED RELEASE ORAL at 17:23

## 2018-10-15 RX ADMIN — PREGABALIN 200 MG: 100 CAPSULE ORAL at 09:03

## 2018-10-15 RX ADMIN — METOPROLOL SUCCINATE 50 MG: 50 TABLET, EXTENDED RELEASE ORAL at 09:03

## 2018-10-15 RX ADMIN — Medication 10 ML: at 02:18

## 2018-10-15 RX ADMIN — POTASSIUM CHLORIDE 10 MEQ: 750 TABLET, FILM COATED, EXTENDED RELEASE ORAL at 09:03

## 2018-10-15 RX ADMIN — ATORVASTATIN CALCIUM 40 MG: 40 TABLET, FILM COATED ORAL at 21:03

## 2018-10-15 RX ADMIN — Medication 10 ML: at 14:00

## 2018-10-15 RX ADMIN — ASPIRIN 81 MG CHEWABLE TABLET 81 MG: 81 TABLET CHEWABLE at 09:03

## 2018-10-15 RX ADMIN — Medication 10 ML: at 21:04

## 2018-10-15 RX ADMIN — AMIODARONE HYDROCHLORIDE 400 MG: 200 TABLET ORAL at 09:03

## 2018-10-15 RX ADMIN — VENLAFAXINE HYDROCHLORIDE 150 MG: 150 CAPSULE, EXTENDED RELEASE ORAL at 09:03

## 2018-10-15 RX ADMIN — LISINOPRIL 2.5 MG: 5 TABLET ORAL at 17:24

## 2018-10-15 RX ADMIN — Medication 400 MG: at 09:03

## 2018-10-15 RX ADMIN — PREGABALIN 200 MG: 100 CAPSULE ORAL at 17:23

## 2018-10-15 NOTE — CARDIO/PULMONARY
Cardiopulmonary Rehab Nursing Entry: 
  
Pt on the CHF bundle list 
 
Pt with acute hypoxic respiratory failure, CHF, a-fib with RVR, HTN, dyslipidemia, never smoker, EF 15-20%. Met with pt sitting up in bedside chair. This was a follow-up visit to answer questions and reinforce prior teaching re: CHF, S&Ss, medication management, Low NA diet, daily weights and when to call the doctor. Pt had not specifically been following a low sodium diet, verbal instruction provided on foods to avoid and how to season her food without added table salt. Written materials had been provided but, sent to her home. Pt uses a pillbox with the assistance of her  for medication compliance. She was weighing herself about twice a week PTA, education provided on the rationale for daily weigh ins and the parameters of weight gain and when to contact MD. Pt reports some issues with neuropathy that limit her physically but, she is ambulatory with either assist of a walker or rollator. She voiced interest in Cardiac Rehab after possible ablation or re-admission to change device to an AICD. Will continue to follow pt. Pt verbalized understanding.

## 2018-10-15 NOTE — PROGRESS NOTES
Chart review. PTOT are recommending SNF. CM reviewed recommendaiton with patient. She would like to discuss with her  and will get back with me. SNF list left at bedside. 65 - Mr. Lucinda Johnson called to discuss discharge planning with CM. He would like a referral submitted to [facility] on his wife's behalf. FYI to attending requesting consult. Madyson Tovar RN CM Ext [phone]

## 2018-10-15 NOTE — PROGRESS NOTES
Patient declining PT due to recent arrival of visitors. Will f/u today as able. Mrecedes Pinedo, PT, DPT Board-Certified Geriatric Clinical Specialist  
Certified Exercise Expert for Aging Adults

## 2018-10-15 NOTE — PROGRESS NOTES
Bedside shift change report given to Rosemarie Lucio RN (oncoming nurse). Report included the following information SBAR, Kardex, ED Summary, Intake/Output, MAR and Recent Results. SHIFT SUMMARY: 
 
 
 
 
 
[address] NURSING NOTE Admission Date 10/6/2018 Admission Diagnosis Acute respiratory failure ([address] Consults IP CONSULT TO CARDIOLOGY Cardiac Monitoring [x] Yes [] No  
  
Purposeful Hourly Rounding [x] Yes   
Ananya Score Total Score: 6 Ananya score 3 or > [x] Bed Alarm [] Avasys [] 1:1 sitter [] Patient refused (Signed refusal form in chart) Preet Score Preet Score: 18 Preet score 14 or < [] PMT consult [] Wound Care consult  
 []  Specialty bed  [] Nutrition consult Influenza Vaccine Received Flu Vaccine for Current Season (usually Sept-March): No  
 Patient/Guardian Refused (Notify MD): No  
  
Oxygen needs? [x] Room air Oxygen @  []1L    []2L    []3L   []4L    []5L   []6L via NC Chronic home O2 use? [] Yes [x] No 
Perform O2 challenge test and document in progress note using smartphMYOMOe (.Homeoxygen) Last bowel movement Last Bowel Movement Date: 10/13/18 Urinary Catheter [REMOVED] Urinary Catheter 10/06/18 Vargas-Indications for Use: Accurate measurement of urinary output [REMOVED] External Female Catheter 10/10/18-Urine Output (mL): 0 ml [REMOVED] External Female Catheter 10/12/18-Urine Output (mL): 500 ml [REMOVED] Urinary Catheter 10/06/18 Vargas-Urine Output (mL): 200 ml LDAs Peripheral IV 10/12/18 Right Antecubital (Active) Site Assessment Clean, dry, & intact 10/14/2018 10:54 PM  
Phlebitis Assessment 0 10/14/2018 10:54 PM  
Infiltration Assessment 0 10/14/2018 10:54 PM  
Dressing Status Clean, dry, & intact 10/14/2018 10:54 PM  
Dressing Type Transparent 10/14/2018 10:54 PM  
Hub Color/Line Status Pink;Capped 10/14/2018 10:54 PM  
Alcohol Cap Used Yes 10/12/2018 10:24 AM  
                  
  
Readmission Risk Assessment Tool Score High Risk 27      
Total Score 3 Has Seen PCP in Last 6 Months (Yes=3, No=0) 2 . Living with Significant Other. Assisted Living. LTAC. SNF. or  
Rehab  
 3 Patient Length of Stay (>5 days = 3)  
 5 Pt. Coverage (Medicare=5 , Medicaid, or Self-Pay=4) 14 Charlson Comorbidity Score (Age + Comorbid Conditions) Criteria that do not apply:  
 IP Visits Last 12 Months (1-3=4, 4=9, >4=11) Expected Length of Stay 4d 2h Actual Length of Stay 8

## 2018-10-15 NOTE — PROGRESS NOTES
Cardiology Progress Note 10/15/2018     Admit Date: 10/6/2018 Admit Diagnosis: Acute respiratory failure (HCC)  CC: none currently Cardiac Assessment/Plan:  
Ms Alvin Ybarra has a h/o: 
1) CAD (LAD ISELA 2014 for NQMI), Neg PET for ischemia 9/2018. 
2) mixed ischemic/tachycardia mediated CM 4/2014 (EF 20%); EF 45% 11/2017. [facility] was too expensive. 3) HTN, 4) PAfib (RFA 4/2016 and 1/2017), Recurrrent/persistent afib 2017/2018 5) DM,  
6) SHAYLA (on CPAP),  
7) CKD (Cr 1.5 range). Aldactone stopped in 2014. 8) [facility] Don PPM 7/2014 for bradycardia while on therapy for AFib. CAD: LAD ISELA 2014; recent PET w/o ischemia but recurrent low EF: if not improved with med Rx, may need repeat cath. CM: Echo 10/9/18: EF 15-20%. Has diuresed well; on PO Bumex 1 qday. Tolerating BBlocker @ lower dose than previous; adding back low-dose ACEi (lisinopril 2.5 qday) HTN: off metoprolol/lisinopril with low BPs after ETT/sedation: Levophed off since evening of 10/7; change to prn Jose: restarted BBlocker (toprol XL 50 qday now). Rhythm: PAFib; recurrent; remains on PO amio; Dr Aston Ruiz rec: stablized CHF then considering AV node ablation/BiV PM or ICD. Cont anticoagulation (lovenox for now; PO after ablation plan/BiV upgrade decided). Renal function: Cr down to 1.7 from 2; (Cr 1.3 range typically). Resp failure, SHAYLA, DM, Dispo: per primary team.  
For other plans, see orders. 10/14: On Room air, cr still at 2, bumex PO restarted, amiodarone decreaesed from TID to bid, HR improved. 10/15: No orthopnea; much less STEEN; fell in bathroom yesterday (legs weak/poor balance): will need some rehab. No CP; PO bumex 1 qday; changed metoprolol to XL; added lisinopril 2.5; cont amio PO bid. 
______________________________________________________________________________________________ 
@ OV 10/2/18: 
Ms Alvin Ybarra has a h/o: 
1) CAD (LAD ISELA 2014 for NQMI), Neg PET for ischemia 9/2018. 2) mixed ischemic/tachycardia mediated CM 4/2014 (EF 20%); EF 45% 11/2017. [facility] was too expensive. 3) HTN, 4) PAfib (RFA 4/2016 and 1/2017), Recurrrent/persistent afib 2017/2018 5) DM,  
6) SHAYLA (on CPAP),  
7) CKD (Cr 1.5 range). Aldactone stopped in 2014. 8) St Don PPM 7/2014 for bradycardia while on therapy for AFib. 
 
11/2017: No CP/STEEN; Back in afib today:  Coreg changed Toprol-XL. 10/2018:  Recent ER visit with epigastric pain; negative evaluation there & seen by Dr. William Still who set up a cardiac PET. The PET shows no ischemia but EF suggested at 16%. Of note patient does have AFib with accelerated ventricular response. No recurrence of epigastric discomfort. No bleeding. Had a simple trip at home a few weeks ago without injury. IMPRESSION AND PLAN 
  
01. (I25.10) Atherosclerotic heart disease of native coronary artery without angina pectoris:  No anginal symptoms. Cont asa and stopped plavix with need for anticoagulation. We discussed the signs and symptoms of unstable angina, myocardial infarction and malignant arrhythmia. The patient knows to seek immediate medical attention should they occur. ECG done today 02. (I42.0) Dilated cardiomyopathy:  Mixed ischemic/non-ischemic (tachycardia mediated) CM. Her ejection fraction is greater than 35% after repeat echo. The patient's systolic function has been stable. I suspect the PET has a falsely low EF related to her AFib but repeat echo is needed. 2-D w/CFD Echo to be done first available. 03. (I50.42) Chronic combined systolic (congestive) and diastolic (congestive) heart failure:  Resolved exertional symptoms. (NYHA I)  The patient is compliant with their CHF regimen. 04. (I48.1) Persistent atrial fibrillation:  Management per Dr Serenity Marcos; currently off amio and on Pradaxa. Heart rate has been to elevated; increase Toprol  q.day. Further AFib monitoring/therapy per Dr. Aston Ruiz. 05. (I13.0) Hyp hrt & chr kdny dis w hrt fail and stg 1-4/unsp chr kdny:  Adequately controlled. We will see how the patient does with the med changes noted above. 06. (E78.2) Mixed hyperlipidemia:  Lipid labs drawn by PCP. The patient is tolerating lipid lowering therapy well. 07. (N18.3) Chronic kidney disease, stage 3 (moderate):  Cr 1.24/GFR46 11/2015. Cr 1.32/GFR 43 1/2017.  
08. (R60.0) Localized edema:  Resolved. She avoids salt. 09. (E11.69) Type 2 diabetes mellitus with other specified complication:  Lipids & HTN as noted above; DM managed by other MD.  
10. (Z95.0) Presence of cardiac pacemaker:  will continue to be followed in device clinic. 11. (Z79.82) Long term (current) use of aspirin:  This condition is stable. 12. (Z79.01) Long term (current) use of anticoagulants: This condition is stable. Indicated for atrial fibrillation. No bleeding. If she has recurrent falls, she knows to call for re-evaluation of anticoagulation. 13. (Z68.38) Body mass index (BMI) 38.0-38.9, adult:  The patient was instructed on AHA diet and regular exercise. ORDERS: 
    
1 ECG done today 2 2-D w/ CFD Echocardiogram first available. 3 Return office visit with Rosa Little MD in 6 Months. 4 The patient was instructed on AHA diet and regular exercise. 10/2/18 MEDICATION LIST Medication Sig Description  
amlodipine 5 mg tablet take 1 tablet by oral route  every day per pc  
aspirin 81 mg tablet,delayed release take 1 tablet by oral route  every day  
atorvastatin 40 mg tablet take 1 tablet by oral route  every evening  
bumetanide 1 mg tablet 1 in am on tues&thursday 2 tablets on mon wed fridays Calcium 600 600 mg (1,500 mg) tablet daily  
clonazepam 0.5 mg tablet take 1 tablet by oral route  every day LISINOPRIL 20 MG Tablet TAKE 1 TABLET EVERY DAY Lyrica 200 mg capsule take 1 capsule by oral route 2 times every day magnesium 250 mg tablet take 1 tablet by oral route  every day  
metoprolol succinate  mg tablet,extended release 24 hr take 1 tablet by oral route  every day  
potassium gluconate 500 mg (83 mg) tablet daily Pradaxa 150 mg capsule take 1 capsule by oral route 2 times every day Premarin 0.625 mg/gram vaginal cream insert 0.5 applicatorful by vaginal route  every day cyclically, 3 weeks on and 1 week off  
venlafaxine 75 mg tablet 2 po bid Vitamin D3 1,000 unit capsule take 1 daily  
 
____________________________________________________________________________________________________ CARDIAC HISTORY 
CAD: 
   
1 NSTEMI [95% Proximal LAD, Resolute (ISELA) to the Proximal LAD.] - 4/8/2014 CHF/CM: 
   
1 Mixed ischemic/tachycardic CM. [Nl TSH.] - 4/2014  
2 Ischemic & tachycardic Cardiomyopathy [Echo (EF 0.45) - 06/17/2014, (prev EF 20-30%)] - 6/2014 ARRHYTHMIA: 
   
1 A Fib [DCCV, Plan: amio started; Eliquis with Effient (change eliquis to asa if NSR after 30 days). ] - 4/8/2014  
2 PAF - 8/2010  
3 A Fib, recurrent; and 6/2014. [Had marked sinus bradycardia while on bblocker/dig.] - 5/2014  
4 Sinus Bradycardia. - 6/2/2014  
5 PPM (Devices (Dual Chamber PPM (Starlett Child)) - 7/17/2014) - 7/17/2014  
6 PAF [CVR; Eliquis restarted (plavix stopped). ] - 9/2015  
7 A Fib [RFA] - 4/2016  
8 A Fib [RFA] - 1/2017 RISK FACTORS: 
   
1 Diabetes [Diagnosed in 2014] 2 Hypertension 3 Dyslipidemia CARDIOVASCULAR PROCEDURES Procedure Date Results Cardiac PET 09/24/2018 EF 0.16 (16%), physiologic apical thinning with no ischemia Cath 04/08/2014 95% Proximal LAD, Resolute (ISELA) to the Proximal LAD. DCCV 04/08/2014 Initial Rhythm A Fib, Final Rhythm Sinus Devices 07/17/2014 Dual Chamber PPM (Starlett Child) Echo 11/28/2017 EF 0.45 (45%), Mild Global Hypo, Mild TR, Mild LVH, Mild MR, Est RVSP 41 mmHg, Normal RV. (probable trileaflet AoV). Echo 10/30/2015 EF 0.45 (45%), Mild LVH, LA Diam 4.1 cm, Est RVSP 35 mmHg, Normal RV. ?bicuspid AoV; (Prob trileaflet on previous ANGELITO). Echo 2014 EF 0.45 (45%), EF range 45%-50%, Mild LV dilatation. Mild LV systolic dysfunction. ? Bicuspid AoV but prob trileafet on previous ANGELITO. Echo 2014 EF 0.20 (20%), global HK with lateral sparing; no valve disease. EKG 10/02/2018 Atrial Fib, ; nonspecific T-wave flattening. Holter 2014 No Malignant Arrhythmias, Atrial Fib, No correlation with symptoms, (, [address] Holter 2014 No Malignant Arrhythmias, Atrial Fib, No correlation with symptoms, \"light or heavy chest\" = afib in 60s. HR  (ave 63). ASx pauses (upto 2.8) while asleep. RFA 2017 Indication A Fib, Final Rhythm Sinus RFA  Indication A Fib Sleep Study  OSAS: Moderate ANGELITO 2014 EF 0.35 (35%), No BRAYDON Clot, prob trileaflet AoV. ACTIVE ALLERGIES: 
Ingredient Reaction Comment SACUBITRIL Alvira Light is too expensive SULFA (SULFONAMIDE ANTIBIOTICS) AMOXICILLIN TRIHYDRATE    
POTASSIUM CLAVULANATE ACTIVE INTOLERANCES: 
Description Reaction Comment SACUBITRIL Alvira Light is too expensive PROBLEM LIST: 
Problem Description Chronic Benign essential HTN Y  
DM type 2 N A-fib Y  
CAD Y Mixed hyperlipidemia Y  
 
 
PAST MEDICAL/SURGICAL HISTORY  (Detailed) Disease/disorder Onset Date Management Date Comments  
  hysterectomy A-FIB Cardiovascular disease CHF Hyperlipidemia Hypertension SHAYLA: moderate. 2014 Family History  (Detailed) Relationship Family Member Name  Age at Death Condition Onset Age Cause of Death Brother    Hypertension  N Mother    PAD  N Mother    Hypertension  N Mother    Cardiac arrhythmias  N Sister    Diabetes mellitus  N  
 
SOCIAL HISTORY  (Detailed) Preferred language is [place].    
EDUCATION/EMPLOYMENT/OCCUPATION 
 Employment History Status Retired Restrictions  
  retired    
  retired MARITAL STATUS/FAMILY/SOCIAL SUPPORT Currently . For other plans, see orders. Hospital problem list  
Active Hospital Problems Diagnosis Date Noted  AYLIN (acute kidney injury) ([facility] 10/13/2018 Suspect prerenal 
  
 Atrial fibrillation with rapid ventricular response ([address] 10/07/2018  Hypokalemia 10/07/2018  Acute respiratory failure with hypoxemia ([address] 10/06/2018  Type 2 diabetes mellitus with diabetic neuropathy, without long-term current use of insulin ([facility] 2016  Acute systolic heart failure ([facility] 2014 Subjective: Rhonda Card reports Chest pain X none  consistent with:  Non-cardiac CP Atypical CP None now  On going  Anginal CP Dyspnea  none  at rest  with exertion    
   x improved  unchanged  worse PND X none  overnight Orthopnea X none  improved  unchanged  worse Presyncope X none  improved  unchanged  worse Ambulated in hallway without symptoms   Yes Ambulated in room without symptoms  Yes Objective:  
 Physical Exam: 
Overall VSSAF;   
Visit Vitals  /85 (BP 1 Location: Left arm, BP Patient Position: At rest;Supine)  Pulse 97  Temp 97.7 °F (36.5 °C)  Resp 18  Ht 5' 9\" (1.753 m)  Wt 104.6 kg (230 lb 9.6 oz)  SpO2 98%  Breastfeeding No  
 BMI 34.05 kg/m2 Temp (24hrs), Av.7 °F (36.5 °C), Min:97.5 °F (36.4 °C), Max:97.9 °F (36.6 °C) Patient Vitals for the past 8 hrs: 
 Pulse 10/15/18 0727 97  
10/15/18 0315 69 Patient Vitals for the past 8 hrs: 
 Resp 10/15/18 0727 18  
10/15/18 0315 18 Patient Vitals for the past 8 hrs: 
 BP  
10/15/18 0727 134/85  
10/15/18 0315 134/70 Intake/Output Summary (Last 24 hours) at 10/15/18 3993 Last data filed at 10/15/18 8857 Gross per 24 hour Intake             1040 ml Output              950 ml Net               90 ml General Appearance: Well developed, well nourished, no acute distress. Ears/Nose/Mouth/Throat:   Normal MM; anicteric. JVP: WNL Resp:   clear to auscultation bilaterally. Nl resp effort. Cardiovascular:  IRRR, S1, S2 normal, no new murmur. No gallop or rub. Abdomen:   Soft, non-tender, bowel sounds are present. Extremities: Min edema bilaterally. Skin: 
Neuro: Warm and dry. A/O x3, grossly nonfocal  
cath site intact w/o hematoma or new bruit; distal pulse unchanged  Yes Data Review:    
Telemetry independently reviewed  sinus x chronic afib  parox afib  NSVT  
 
ECG independently reviewed  NSR  afib  no significant changes  NSST-Tw chgs  
x no new ECG provided for review Lab results reviewed as noted below. Current medications reviewed as noted below. No results for input(s): PH, PCO2, PO2 in the last 72 hours. No results for input(s): CPK, CKMB, CKNDX, TROIQ in the last 72 hours. Recent Labs 10/15/18 
 0220  10/14/18 
 8641  10/13/18 
 7036 NA  142  139  136  
K  3.8  3.5  3.6 CL  110*  106  103 CO2  26  25  23 BUN  56*  61*  58* CREA  1.72*  2.13*  2.13* GFRAA  36*  28*  28* GLU  119*  119*  103* CA  8.4*  8.6  8.7 WBC  9.9  12.2*  13.4* HGB  11.7  13.3  14.2 HCT  36.3  41.2  45.2 PLT  216  306  274 Lab Results Component Value Date/Time Cholesterol, total 104 08/01/2018 10:17 AM  
 HDL Cholesterol 33 (L) 08/01/2018 10:17 AM  
 LDL, calculated 49 08/01/2018 10:17 AM  
 Triglyceride 110 08/01/2018 10:17 AM  
 CHOL/HDL Ratio 4.7 04/03/2014 03:30 PM  
 
No results for input(s): SGOT, GPT, AP, TBIL, TP, ALB, GLOB, GGT, AML, LPSE in the last 72 hours. No lab exists for component: AMYP, HLPSE No results for input(s): INR, PTP, APTT in the last 72 hours. No lab exists for component: INREXT No components found for: [place] Current Facility-Administered Medications Medication Dose Route Frequency  metoprolol succinate (TOPROL-XL) XL tablet 50 mg  50 mg Oral DAILY  lisinopril (PRINIVIL, ZESTRIL) tablet 2.5 mg  2.5 mg Oral DAILY  bumetanide (BUMEX) tablet 1 mg  1 mg Oral DAILY  potassium chloride SR (KLOR-CON 10) tablet 10 mEq  10 mEq Oral DAILY  amiodarone (CORDARONE) tablet 400 mg  400 mg Oral BID  influenza vaccine 2018-19 (6 mos+)(PF) (FLUARIX QUAD/FLULAVAL QUAD) injection 0.5 mL  0.5 mL IntraMUSCular PRIOR TO DISCHARGE  enoxaparin (LOVENOX) injection 100 mg  1 mg/kg SubCUTAneous Q24H  
 conjugated estrogens (PREMARIN) 0.625 mg/gram vaginal cream 0.5 g  0.5 g Vaginal EVERY TU & FRI  insulin lispro (HUMALOG) injection   SubCUTAneous AC&HS  
 glucose chewable tablet 16 g  4 Tab Oral PRN  
 dextrose (D50W) injection syrg 12.5-25 g  12.5-25 g IntraVENous PRN  
 glucagon (GLUCAGEN) injection 1 mg  1 mg IntraMUSCular PRN  
 venlafaxine-SR (EFFEXOR-XR) capsule 150 mg  150 mg Oral BID  magnesium oxide (MAG-OX) tablet 400 mg  400 mg Oral DAILY  pregabalin (LYRICA) capsule 200 mg  200 mg Oral BID  aspirin chewable tablet 81 mg  81 mg Oral DAILY  atorvastatin (LIPITOR) tablet 40 mg  40 mg Oral QHS  sodium chloride (NS) flush 5-10 mL  5-10 mL IntraVENous Q8H  
 sodium chloride (NS) flush 5-10 mL  5-10 mL IntraVENous PRN  
 acetaminophen (TYLENOL) tablet 650 mg  650 mg Oral Q6H PRN  
 docusate sodium (COLACE) capsule 100 mg  100 mg Oral DAILY PRN Shyrl Lesch, MD

## 2018-10-15 NOTE — PROGRESS NOTES
Attempted to see pt for OT services. Pt just had visitors arrived and declined services. Will follow up at a later time for OT services.

## 2018-10-15 NOTE — PROGRESS NOTES
Bedside shift change report given to Codie Baron RN (oncoming nurse). Report included the following information SBAR, Kardex, Intake/Output, MAR and Recent Results. SHIFT SUMMARY: 
 
 
 
 
 
[address] NURSING NOTE Admission Date 10/6/2018 Admission Diagnosis Acute respiratory failure ([address] Consults IP CONSULT TO CARDIOLOGY 
IP CONSULT TO [facility] PHYSICIAN Cardiac Monitoring [x] Yes [] No  
  
Purposeful Hourly Rounding [x] Yes   
Ananya Score Total Score: 6 Ananya score 3 or > [x] Bed Alarm [] Avasys [] 1:1 sitter [] Patient refused (Signed refusal form in chart) Preet Score Preet Score: 18 Preet score 14 or < [] PMT consult [] Wound Care consult  
 []  Specialty bed  [] Nutrition consult Influenza Vaccine Received Flu Vaccine for Current Season (usually Sept-March): No  
 Patient/Guardian Refused (Notify MD): No  
  
Oxygen needs? [x] Room air Oxygen @  []1L    []2L    []3L   []4L    []5L   []6L via NC Chronic home O2 use? [] Yes [x] No 
Perform O2 challenge test and document in progress note using smartphrase (.Homeoxygen) Last bowel movement Last Bowel Movement Date: 10/13/18 Urinary Catheter [REMOVED] Urinary Catheter 10/06/18 Vargas-Indications for Use: Accurate measurement of urinary output [REMOVED] External Female Catheter 10/10/18-Urine Output (mL): 0 ml [REMOVED] External Female Catheter 10/12/18-Urine Output (mL): 500 ml [REMOVED] Urinary Catheter 10/06/18 Vargas-Urine Output (mL): 200 ml LDAs Peripheral IV 10/12/18 Right Antecubital (Active) Site Assessment Clean, dry, & intact 10/15/2018  7:44 PM  
Phlebitis Assessment 0 10/15/2018  7:44 PM  
Infiltration Assessment 0 10/15/2018  7:44 PM  
Dressing Status Clean, dry, & intact 10/15/2018  7:44 PM  
Dressing Type Transparent 10/15/2018  7:44 PM  
Hub Color/Line Status Pink;Capped 10/15/2018  7:44 PM  
Alcohol Cap Used Yes 10/12/2018 10:24 AM  
                  
  
 Readmission Risk Assessment Tool Score High Risk   
      
 27 Total Score 3 Has Seen PCP in Last 6 Months (Yes=3, No=0) 2 . Living with Significant Other. Assisted Living. LTAC. SNF. or  
Rehab  
 3 Patient Length of Stay (>5 days = 3)  
 5 Pt. Coverage (Medicare=5 , Medicaid, or Self-Pay=4) 14 Charlson Comorbidity Score (Age + Comorbid Conditions) Criteria that do not apply:  
 IP Visits Last 12 Months (1-3=4, 4=9, >4=11) Expected Length of Stay 4d 2h Actual Length of Stay 9

## 2018-10-15 NOTE — PROGRESS NOTES
[address] [place] [address] met with patient today to provide an introduction to self, the [id number] at [facility] Bundled Payment Care Program: 
 
Patient was provided a copy of the [facility] letter. Patient states that they have a functioning scale at home. Patient was not provided a scale during this visit. Reinforced the importance of checking their weight at the same time daily and calling the cardiologist if their weight is up 3 lbs. in a day or 5 lbs. in a week (or per MD guidelines). Patient  has received a \"Living with Heart Failure\" patient education book. [facility] Program: 
Patient was interested in the [address] [employer] program. Will return tomorrow to discuss further and see if patient would like to have the [facility]. Provided patient contact number for the [address] [place] [address] for further questions or concerns. Patient verbalized understanding of all information discussed.

## 2018-10-15 NOTE — PROGRESS NOTES
[address] [place] [address] 
[phone] Medical Progress Note NAME: Paola Birmingham :  1950 MRM:  [id number] Date/Time: 10/15/2018  5:53 AM 
  
Subjective:  
 
Admitted with acute respiratory failure with hypoxemia due to systolic CHF associated with atrial fib with RVR. Bedside EF 15-20%. Chest xray showed moderate CHF. Respiratory failure rapidly progressed with subsequent intubation. Patient on pressors for short period of time. Afib controlled with amiodarone with exacerbations due to hypokalemia related to diuresis. Successfully extubated  complicated by stridor. Patient currently awake,oriented x3. Diana Juanito yesterday after getting OOB with no apparent injury. Currently in afib . O2 sat's adequate on nasal O2. K+ 3.8. Renal function improved. Past Medical History:  
Diagnosis Date  Anxiety 2018  Arthritis OA  Asthma  Diabetes ([facility] [address]  Essential hypertension  GERD (gastroesophageal reflux disease)  Hypercholesterolemia 2018  Hypertension  Long-term use of high-risk medication 2018  Neuropathy  Other ill-defined conditions(799.89) IBS, spinal stenosis  Plantar fasciitis  Psychiatric disorder   
 depression, anxiety  Sleep apnea   
 uses CPAP  Type 2 diabetes mellitus with diabetic neuropathy, without long-term current use of insulin ([facility] [address] 2016 ROS:  General: postive for weakness Respiratory: negative for cough, congestion Cardiology:  negative for chest pain, palpitations, 
Gastrointestinal: negative for abdominal pain, N/V Muskuloskeletal : negative for arthralgia, myalgia Neurological: negative for headache, dizziness or focal deficits Psychological: positive for less anxiety Review of systems otherwise negative Objective:  
 
 
Vitals:  
 
  
Last 24hrs VS reviewed since prior progress note. Most recent are: 
 
Visit Vitals  /70  Pulse 69  Temp 97.7 °F (36.5 °C)  Resp 18  Ht 5' 9\" (1.753 m)  Wt 230 lb 9.6 oz (104.6 kg)  SpO2 96%  Breastfeeding No  
 BMI 34.05 kg/m2 SpO2 Readings from Last 6 Encounters:  
10/15/18 96% 09/10/18 96% 08/07/18 94% 08/01/18 96% 03/14/18 96% 02/22/18 97% O2 Flow Rate (L/min): 2 l/min Intake/Output Summary (Last 24 hours) at 10/15/18 0559 Last data filed at 10/15/18 7742 Gross per 24 hour Intake             1040 ml Output              950 ml Net               90 ml Telemetry reviewed:   afib Tubes:   Vargas, central line X-Ray:  Admission chest xray - moderated CHF Procedures:   Echo - Left ventricle: Ejection fraction was estimated in the range of 15 % to 20 
%. There was diffuse hypokinesis. Mitral valve: There was mild regurgitation. Exam:  
 
General   well developed, well nourished, appears stated age in no respiratory distress Neck   Supple without nodes or mass. No thyromegaly or bruit Respiratory   Lungs much clearer Cardiology  RRR without murmur Abdominal  Soft, non-tender, non-distended, positive bowel sounds, no hepatosplenomegaly Extremities  Without LE edema Skin  Normal skin turgor. No rashes or skin ulcers noted Neurological No focal deficit noted Psychological  Alert, oriented x3 Exam otherwise negative Lab Data Reviewed: (see below) Medications Reviewed: (see below) 
 
______________________________________________________________________ Medications:  
 
Current Facility-Administered Medications Medication Dose Route Frequency  bumetanide (BUMEX) tablet 1 mg  1 mg Oral DAILY  potassium chloride SR (KLOR-CON 10) tablet 10 mEq  10 mEq Oral DAILY  amiodarone (CORDARONE) tablet 400 mg  400 mg Oral BID  influenza vaccine 2018-19 (6 mos+)(PF) (FLUARIX QUAD/FLULAVAL QUAD) injection 0.5 mL  0.5 mL IntraMUSCular PRIOR TO DISCHARGE  
  enoxaparin (LOVENOX) injection 100 mg  1 mg/kg SubCUTAneous Q24H  
 conjugated estrogens (PREMARIN) 0.625 mg/gram vaginal cream 0.5 g  0.5 g Vaginal EVERY TU & FRI  metoprolol tartrate (LOPRESSOR) tablet 25 mg  25 mg Oral Q12H  
 insulin lispro (HUMALOG) injection   SubCUTAneous AC&HS  
 glucose chewable tablet 16 g  4 Tab Oral PRN  
 dextrose (D50W) injection syrg 12.5-25 g  12.5-25 g IntraVENous PRN  
 glucagon (GLUCAGEN) injection 1 mg  1 mg IntraMUSCular PRN  
 venlafaxine-SR (EFFEXOR-XR) capsule 150 mg  150 mg Oral BID  magnesium oxide (MAG-OX) tablet 400 mg  400 mg Oral DAILY  pregabalin (LYRICA) capsule 200 mg  200 mg Oral BID  aspirin chewable tablet 81 mg  81 mg Oral DAILY  atorvastatin (LIPITOR) tablet 40 mg  40 mg Oral QHS  sodium chloride (NS) flush 5-10 mL  5-10 mL IntraVENous Q8H  
 sodium chloride (NS) flush 5-10 mL  5-10 mL IntraVENous PRN  
 acetaminophen (TYLENOL) tablet 650 mg  650 mg Oral Q6H PRN  
 docusate sodium (COLACE) capsule 100 mg  100 mg Oral DAILY PRN Lab Review:  
 
Recent Labs 10/15/18 
 0220  10/14/18 
 2895  10/13/18 
 4752 WBC  9.9  12.2*  13.4* HGB  11.7  13.3  14.2 HCT  36.3  41.2  45.2 PLT  216  306  274 Recent Labs 10/15/18 
 0220  10/14/18 
 9756  10/13/18 
 1153 NA  142  139  136  
K  3.8  3.5  3.6 CL  110*  106  103 CO2  26  25  23 GLU  119*  119*  103* BUN  56*  61*  58* CREA  1.72*  2.13*  2.13* CA  8.4*  8.6  8.7 Lab Results Component Value Date/Time Glucose (POC) 116 (H) 10/14/2018 09:06 PM  
 Glucose (POC) 89 10/14/2018 04:50 PM  
 Glucose (POC) 146 (H) 10/14/2018 11:31 AM  
 Glucose (POC) 134 (H) 10/14/2018 08:23 AM  
 Glucose (POC) 126 (H) 10/13/2018 09:00 PM  
 
No results for input(s): PH, PCO2, PO2, HCO3, FIO2 in the last 72 hours. No results for input(s): INR in the last 72 hours. No lab exists for component: INREXT, INREXT Assessment:  
 
Principal Problem: Acute respiratory failure with hypoxemia ([facility] [address] (10/6/2018) Active Problems: 
  Type 2 diabetes mellitus with diabetic neuropathy, without long-term current use of insulin ([facility] [address] (6/5/2016) Acute systolic heart failure ([facility] [address] (4/4/2014) Atrial fibrillation with rapid ventricular response ([facility] [address] (10/7/2018) Hypokalemia (10/7/2018) AYLIN (acute kidney injury) ([facility] [address] (10/13/2018) Overview: Suspect prerenal 
 
 
 
  
Plan:  
 
Risk of deterioration: high 1. Continue O2 as needed 2. Continue oral bumex, amiodarone 3. Continue oral KCl, Mg++ replacement. 4. Cardiology following 5. Follow labs , sat's 6. Probable cardiology intervention this week as noted Care Plan discussed with: Nursing Staff, patient Discussed:  Care Plan Prophylaxis:  Lovenox and SCD's Disposition:  Unknown 
        
___________________________________________________ Attending Physician: Hunter Ruiz MD

## 2018-10-16 LAB
ANION GAP SERPL CALC-SCNC: 4 MMOL/L (ref 5–15)
BUN SERPL-MCNC: 38 MG/DL (ref 6–20)
BUN/CREAT SERPL: 26 (ref 12–20)
CALCIUM SERPL-MCNC: 8.1 MG/DL (ref 8.5–10.1)
CHLORIDE SERPL-SCNC: 111 MMOL/L (ref 97–108)
CO2 SERPL-SCNC: 29 MMOL/L (ref 21–32)
CREAT SERPL-MCNC: 1.47 MG/DL (ref 0.55–1.02)
GLUCOSE BLD STRIP.AUTO-MCNC: 107 MG/DL (ref 65–100)
GLUCOSE BLD STRIP.AUTO-MCNC: 113 MG/DL (ref 65–100)
GLUCOSE BLD STRIP.AUTO-MCNC: 147 MG/DL (ref 65–100)
GLUCOSE BLD STRIP.AUTO-MCNC: 93 MG/DL (ref 65–100)
GLUCOSE SERPL-MCNC: 87 MG/DL (ref 65–100)
POTASSIUM SERPL-SCNC: 4.2 MMOL/L (ref 3.5–5.1)
SERVICE CMNT-IMP: ABNORMAL
SERVICE CMNT-IMP: NORMAL
SODIUM SERPL-SCNC: 144 MMOL/L (ref 136–145)

## 2018-10-16 PROCEDURE — 74011250637 HC RX REV CODE- 250/637: Performed by: INTERNAL MEDICINE

## 2018-10-16 PROCEDURE — 80048 BASIC METABOLIC PNL TOTAL CA: CPT | Performed by: INTERNAL MEDICINE

## 2018-10-16 PROCEDURE — 97530 THERAPEUTIC ACTIVITIES: CPT

## 2018-10-16 PROCEDURE — 94760 N-INVAS EAR/PLS OXIMETRY 1: CPT

## 2018-10-16 PROCEDURE — 97116 GAIT TRAINING THERAPY: CPT

## 2018-10-16 PROCEDURE — 82962 GLUCOSE BLOOD TEST: CPT

## 2018-10-16 PROCEDURE — 74011250636 HC RX REV CODE- 250/636: Performed by: INTERNAL MEDICINE

## 2018-10-16 PROCEDURE — 65660000000 HC RM CCU STEPDOWN

## 2018-10-16 PROCEDURE — 97535 SELF CARE MNGMENT TRAINING: CPT | Performed by: OCCUPATIONAL THERAPIST

## 2018-10-16 PROCEDURE — 36415 COLL VENOUS BLD VENIPUNCTURE: CPT | Performed by: INTERNAL MEDICINE

## 2018-10-16 RX ADMIN — ACETAMINOPHEN 650 MG: 325 TABLET ORAL at 07:13

## 2018-10-16 RX ADMIN — ENOXAPARIN SODIUM 100 MG: 100 INJECTION, SOLUTION INTRAVENOUS; SUBCUTANEOUS at 08:25

## 2018-10-16 RX ADMIN — LISINOPRIL 2.5 MG: 5 TABLET ORAL at 18:11

## 2018-10-16 RX ADMIN — VENLAFAXINE HYDROCHLORIDE 150 MG: 150 CAPSULE, EXTENDED RELEASE ORAL at 08:25

## 2018-10-16 RX ADMIN — BUMETANIDE 1 MG: 1 TABLET ORAL at 08:25

## 2018-10-16 RX ADMIN — VENLAFAXINE HYDROCHLORIDE 150 MG: 150 CAPSULE, EXTENDED RELEASE ORAL at 18:11

## 2018-10-16 RX ADMIN — PREGABALIN 200 MG: 100 CAPSULE ORAL at 08:25

## 2018-10-16 RX ADMIN — Medication 1 CAPSULE: at 11:55

## 2018-10-16 RX ADMIN — AMIODARONE HYDROCHLORIDE 400 MG: 200 TABLET ORAL at 08:25

## 2018-10-16 RX ADMIN — PREGABALIN 200 MG: 100 CAPSULE ORAL at 18:11

## 2018-10-16 RX ADMIN — Medication 10 ML: at 14:00

## 2018-10-16 RX ADMIN — Medication 10 ML: at 20:57

## 2018-10-16 RX ADMIN — CONJUGATED ESTROGENS 0.5 G: 0.62 CREAM VAGINAL at 18:53

## 2018-10-16 RX ADMIN — Medication 400 MG: at 08:25

## 2018-10-16 RX ADMIN — POTASSIUM CHLORIDE 10 MEQ: 750 TABLET, FILM COATED, EXTENDED RELEASE ORAL at 08:25

## 2018-10-16 RX ADMIN — ASPIRIN 81 MG CHEWABLE TABLET 81 MG: 81 TABLET CHEWABLE at 08:25

## 2018-10-16 RX ADMIN — AMIODARONE HYDROCHLORIDE 400 MG: 200 TABLET ORAL at 18:11

## 2018-10-16 RX ADMIN — Medication 10 ML: at 05:03

## 2018-10-16 RX ADMIN — METOPROLOL SUCCINATE 50 MG: 50 TABLET, EXTENDED RELEASE ORAL at 08:25

## 2018-10-16 RX ADMIN — ATORVASTATIN CALCIUM 40 MG: 40 TABLET, FILM COATED ORAL at 20:57

## 2018-10-16 NOTE — PROGRESS NOTES
Physical Therapy Goals Initiated 10/11/2018 1. Patient will move from supine to sit and sit to supine , scoot up and down and roll side to side in bed with independence within 7 day(s). 2. Patient will transfer from bed to chair and chair to bed with modified independence using the least restrictive device within 7 day(s). 3. Patient will perform sit to stand with modified independence within 7 day(s). 4. Patient will ambulate with modified independence for 250 feet with the least restrictive device within 7 day(s). 5. Patient will ascend/descend 3 stairs with 1 handrail(s) with supervision/set-up within 7 day(s). physical Therapy TREATMENT Patient: Candi Virgen (54 y.o. female) Date: 10/16/2018 Diagnosis: Acute respiratory failure ([address] Acute respiratory failure with hypoxemia (HCC) Precautions: Fall Chart, physical therapy assessment, plan of care and goals were reviewed. ASSESSMENT: 
Pt presented supine, pleasant and agreeable to therapy. Pt requesting assistance in changing brief, as she had urine incontinence. Pt able to assist 50% in donning/doffing of brief in supine, able to wipe independently. VSS with positional changes, 20 mmHg drop in SBP with standing but pt asymptomatic. Pt able to complete supine/sit transfer with S, and sit<>stand transfers during session with CGA. Pt amb 60 feet within room with RW and CGAx2. Pt with improved gait speed when cued to keep head up and looking forwards. Pt requesting seated rest break after 60 ft, with RPE 5/10. Pt provided with education about use of mirror on floor for feet inspection (diabetic neuropathy in B feet). Pt then completed 1 min of continuous sit<>stand transfers. Pt continued to perform until decline in form. On last stand/sit pt with uncontrolled descent, so exercise discontinued. Pt with RPE 5/10 as well. To end session pt sitting in chair, RN aware. At discharge pt will benefit from attending inpatient rehab. Pt has shown good progress with acute therapy and would likely continue to progress well with intensive therapies. Progression toward goals: 
[x]    Improving appropriately and progressing toward goals 
[]    Improving slowly and progressing toward goals 
[]    Not making progress toward goals and plan of care will be adjusted PLAN: 
Patient continues to benefit from skilled intervention to address the above impairments. Continue treatment per established plan of care. Discharge Recommendations:  Inpatient Rehab Further Equipment Recommendations for Discharge:  Defer to inpatient rehab SUBJECTIVE:  
Patient stated I have to be careful with my feet because of the neuropathy.  OBJECTIVE DATA SUMMARY:  
Critical Behavior: 
Neurologic State: Alert Orientation Level: Oriented X4 Cognition: Appropriate decision making, Appropriate for age attention/concentration, Appropriate safety awareness Safety/Judgement: Awareness of environment, Fall prevention, Home safety Functional Mobility Training: 
Bed Mobility: 
Rolling: Supervision Supine to Sit: Supervision Sit to Supine: Supervision Scooting: Supervision Transfers: 
Sit to Stand: Contact guard assistance Stand to Sit: Contact guard assistance Balance: 
Sitting: Intact Standing: With support; Impaired Standing - Static: Fair Standing - Dynamic : Fair Ambulation/Gait Training: 
Distance (ft): 60 Feet (ft) (30 ft in room x2 laps) Assistive Device: Walker, rolling;Gait belt Ambulation - Level of Assistance: Contact guard assistance;Assist x2 Gait Description (WDL): Exceptions to [facility] Gait Abnormalities: Decreased step clearance;Shuffling gait Base of Support: Widened Speed/Brielle: Pace decreased (<100 feet/min) Step Length: Left shortened;Right shortened Therapeutic Exercises: Sit to stand 1 min, performed until pt with decline in form (uncontrolled descent with sitting) Pain: 
Pain Scale 1: Numeric (0 - 10) Pain Intensity 1: 0 Pain Location 1: Head 
Pain Orientation 1: Mid 
Pain Description 1: Aching Pain Intervention(s) 1: Medication (see MAR) Activity Tolerance: Pt with good activity tolerance, pt able to amb 60 ft prior to needing seated rest break and then able to complete sit<>stand reps as noted above Please refer to the flowsheet for vital signs taken during this treatment. After treatment:  
[x]    Patient left in no apparent distress sitting up in chair 
[]    Patient left in no apparent distress in bed 
[x]    Call bell left within reach [x]    Nursing notified 
[]    Caregiver present [x]    Bed alarm activated COMMUNICATION/COLLABORATION:  
The patients plan of care was discussed with: Registered Nurse Tyrone Loving [facility] Time Calculation: 34 mins Regarding student involvement in patient care: A student participated in this treatment session. Per CMS Medicare statements and APTA guidelines I certify that the following was true: 1. I was present and directly observed the entire session. 2. I made all skilled judgments and clinical decisions regarding care. 3. I am the practitioner responsible for assessment, treatment, and documentation.

## 2018-10-16 NOTE — PROGRESS NOTES
[address] [place] [address] 
[phone] Medical Progress Note NAME: Cinthya Hernandez :  1950 MRM:  [id number] Date/Time: 10/16/2018  5:48 AM 
  
Subjective:  
 
Admitted with acute respiratory failure with hypoxemia due to systolic CHF associated with atrial fib with RVR. Bedside EF 15-20%. Chest xray showed moderate CHF. Respiratory failure rapidly progressed with subsequent intubation. Patient on pressors for short period of time. Afib controlled with amiodarone with exacerbations due to hypokalemia related to diuresis. Successfully extubated  complicated by stridor. Patient currently awake,oriented x2. Currently in afib . O2 sat's adequate on nasal O2. K+ 4.2. Renal function improved. Patient reports 4 days of diarrhea but nurses have not seen any of this. Some slight confusion this AM. Past Medical History:  
Diagnosis Date  Anxiety 2018  Arthritis OA  Asthma  Diabetes ([facility] [address]  Essential hypertension  GERD (gastroesophageal reflux disease)  Hypercholesterolemia 2018  Hypertension  Long-term use of high-risk medication 2018  Neuropathy  Other ill-defined conditions(799.89) IBS, spinal stenosis  Plantar fasciitis  Psychiatric disorder   
 depression, anxiety  Sleep apnea   
 uses CPAP  Type 2 diabetes mellitus with diabetic neuropathy, without long-term current use of insulin ([facility] [address] 2016 ROS:  General: postive for weakness Respiratory: negative for cough, congestion Cardiology:  negative for chest pain, palpitations, 
Gastrointestinal: negative for abdominal pain, N/V Muskuloskeletal : negative for arthralgia, myalgia Neurological: negative for headache, dizziness or focal deficits Psychological: positive for less anxiety Review of systems otherwise negative Objective:  
 
 
Vitals: Last 24hrs VS reviewed since prior progress note. Most recent are: 
 
Visit Vitals  /67  Pulse 85  Temp 98.4 °F (36.9 °C)  Resp 18  Ht 5' 9\" (1.753 m)  Wt 230 lb 9.6 oz (104.6 kg)  SpO2 99%  Breastfeeding No  
 BMI 34.05 kg/m2 SpO2 Readings from Last 6 Encounters:  
10/16/18 99% 09/10/18 96% 08/07/18 94% 08/01/18 96% 03/14/18 96% 02/22/18 97% O2 Flow Rate (L/min): 2 l/min Intake/Output Summary (Last 24 hours) at 10/16/18 5343 Last data filed at 10/16/18 3472 Gross per 24 hour Intake             1040 ml Output             2600 ml Net            -1560 ml Telemetry reviewed:   afib Tubes:   Vargas, central line X-Ray:  Admission chest xray - moderated CHF Procedures:   Echo - Left ventricle: Ejection fraction was estimated in the range of 15 % to 20 
%. There was diffuse hypokinesis. Mitral valve: There was mild regurgitation. Exam:  
 
General   well developed, well nourished, appears stated age in no respiratory distress Neck   Supple without nodes or mass. No thyromegaly or bruit Respiratory   Lungs much clearer Cardiology  RRR without murmur Abdominal  Soft, non-tender, non-distended, positive bowel sounds, no hepatosplenomegaly Extremities  Without LE edema Skin  Normal skin turgor. No rashes or skin ulcers noted Neurological No focal deficit noted Psychological  Alert, oriented x3 Exam otherwise negative Lab Data Reviewed: (see below) Medications Reviewed: (see below) 
 
______________________________________________________________________ Medications:  
 
Current Facility-Administered Medications Medication Dose Route Frequency  metoprolol succinate (TOPROL-XL) XL tablet 50 mg  50 mg Oral DAILY  lisinopril (PRINIVIL, ZESTRIL) tablet 2.5 mg  2.5 mg Oral DAILY  bumetanide (BUMEX) tablet 1 mg  1 mg Oral DAILY  potassium chloride SR (KLOR-CON 10) tablet 10 mEq  10 mEq Oral DAILY  amiodarone (CORDARONE) tablet 400 mg  400 mg Oral BID  influenza vaccine 2018-19 (6 mos+)(PF) (FLUARIX QUAD/FLULAVAL QUAD) injection 0.5 mL  0.5 mL IntraMUSCular PRIOR TO DISCHARGE  enoxaparin (LOVENOX) injection 100 mg  1 mg/kg SubCUTAneous Q24H  
 conjugated estrogens (PREMARIN) 0.625 mg/gram vaginal cream 0.5 g  0.5 g Vaginal EVERY TU & FRI  insulin lispro (HUMALOG) injection   SubCUTAneous AC&HS  
 glucose chewable tablet 16 g  4 Tab Oral PRN  
 dextrose (D50W) injection syrg 12.5-25 g  12.5-25 g IntraVENous PRN  
 glucagon (GLUCAGEN) injection 1 mg  1 mg IntraMUSCular PRN  
 venlafaxine-SR (EFFEXOR-XR) capsule 150 mg  150 mg Oral BID  magnesium oxide (MAG-OX) tablet 400 mg  400 mg Oral DAILY  pregabalin (LYRICA) capsule 200 mg  200 mg Oral BID  aspirin chewable tablet 81 mg  81 mg Oral DAILY  atorvastatin (LIPITOR) tablet 40 mg  40 mg Oral QHS  sodium chloride (NS) flush 5-10 mL  5-10 mL IntraVENous Q8H  
 sodium chloride (NS) flush 5-10 mL  5-10 mL IntraVENous PRN  
 acetaminophen (TYLENOL) tablet 650 mg  650 mg Oral Q6H PRN  
 docusate sodium (COLACE) capsule 100 mg  100 mg Oral DAILY PRN Lab Review:  
 
Recent Labs 10/15/18 
 0220  10/14/18 
 5154 WBC  9.9  12.2* HGB  11.7  13.3 HCT  36.3  41.2 PLT  216  306 Recent Labs 10/16/18 
 0259  10/15/18 
 0220  10/14/18 
 1155 NA  144  142  139  
K  4.2  3.8  3.5 CL  111*  110*  106 CO2  29  26  25 GLU  87  119*  119* BUN  38*  56*  61* CREA  1.47*  1.72*  2.13* CA  8.1*  8.4*  8.6 Lab Results Component Value Date/Time Glucose (POC) 130 (H) 10/15/2018 09:02 PM  
 Glucose (POC) 81 10/15/2018 04:43 PM  
 Glucose (POC) 110 (H) 10/15/2018 11:10 AM  
 Glucose (POC) 103 (H) 10/15/2018 07:26 AM  
 Glucose (POC) 116 (H) 10/14/2018 09:06 PM  
 
No results for input(s): PH, PCO2, PO2, HCO3, FIO2 in the last 72 hours. No results for input(s): INR in the last 72 hours. No lab exists for component: INREXT, INREXT Assessment:  
 
Principal Problem: 
  Acute respiratory failure with hypoxemia ([facility] [address] (10/6/2018) Active Problems: 
  Type 2 diabetes mellitus with diabetic neuropathy, without long-term current use of insulin ([facility] [address] (6/5/2016) Acute systolic heart failure ([address] (4/4/2014) Atrial fibrillation with rapid ventricular response ([facility] [address] (10/7/2018) Hypokalemia (10/7/2018) AYLIN (acute kidney injury) ([facility] [address] (10/13/2018) Overview: Suspect prerenal 
 
 
 
  
Plan:  
 
Risk of deterioration: high 1. Continue O2 as needed 2. Continue oral bumex, amiodarone 3. Continue oral KCl, Mg++ replacement. 4. Cardiology following 5. Follow labs ,sat's 6. Probable cardiology intervention this week as noted 7. ? Confusion - continue to monitor Care Plan discussed with: Nursing Staff, patient Discussed:  Care Plan Prophylaxis:  Lovenox and SCD's Disposition:  Unknown 
        
___________________________________________________ Attending Physician: Kiarra Hare MD

## 2018-10-16 NOTE — PROGRESS NOTES
CM acknowledged [facility] consult. Referral submitted via ecin,. 
 
[facility] is reviewing. Decision pending. Delfino Malhotra, DIANCM Ext [phone]

## 2018-10-16 NOTE — PROGRESS NOTES
Problem: Self Care Deficits Care Plan (Adult) Goal: *Acute Goals and Plan of Care (Insert Text) Occupational Therapy Goals: 
Initiated 10/11/2018 1. Patient will perform grooming standing with modified independence within 7 days. 2. Patient will perform dressing with modified independence within 7 days. 3. Patient will perform toileting with modified independence within 7 days. 4. Patient will bathing with modified independence within 7 days. 5. Patient will transfer from toilet with modified independence using the least restrictive device and appropriate durable medical equipment within 7 days. Occupational Therapy TREATMENT Patient: Yvonne Ellis (75 y.o. female) Date: 10/16/2018 Diagnosis: Acute respiratory failure ([address] Acute respiratory failure with hypoxemia (HCC) Precautions: Fall (EF 15-20%) Chart, occupational therapy assessment, plan of care, and goals were reviewed. ASSESSMENT: 
Pt was seated at bedside chair upon arrival.  Pt is very motivated to regain her strength and independence. Min assist for sit to stand x2 from bedside chair and pt needed RW for support for all mobility. Min assist to manage depends in standing but able to sit on commode for lateral weight shift to perform jasmina care. Mobilized to bathroom with RW with CGA. Unable to perform standing to groom due to LE weakness and LE instability. Returned back to bedside chair with RW for support with min assist. Recommend inpatient rehab at discharge. Vitals:  
   10/16/18 1123 10/16/18 1135 BP:   98/65 104/67 BP 1 Location:   Left arm Left arm BP Patient Position:   Sitting Standing Pulse:   90 98 Resp:   16 Temp:   98 °F (36.7 °C) SpO2:   98% Weight:      
Height:      
 
 
Progression toward goals: 
[x]       Improving appropriately and progressing toward goals 
[]       Improving slowly and progressing toward goals 
[]       Not making progress toward goals and plan of care will be adjusted PLAN: 
Patient continues to benefit from skilled intervention to address the above impairments. Continue treatment per established plan of care. Discharge Recommendations:  Inpatient Rehab Further Equipment Recommendations for Discharge:  TBD SUBJECTIVE:  
Patient stated My legs buckle. It happens more in the afternoon. It started happening when I got sick. Should I use the walker instead of the cane.  OBJECTIVE DATA SUMMARY:  
Cognitive/Behavioral Status: 
Neurologic State: Alert Orientation Level: Oriented X4 Cognition: Appropriate decision making; Appropriate for age attention/concentration; Appropriate safety awareness Perception: Appears intact Perseveration: No perseveration noted Safety/Judgement: Awareness of environment; Fall prevention;Home safety Functional Mobility and Transfers for ADLs:Bed Mobility: 
Rolling:  (seated at bedside chair upon arrival) Transfers: 
Sit to Stand: Minimum assistance; Additional time Functional Transfers Bathroom Mobility: Contact guard assistance (with rolling walker) Toilet Transfer : Minimum assistance (sit to stand standard commode) Balance: 
Sitting: Intact Standing - Static: Fair Standing - Dynamic : Fair ADL Intervention: 
  
 
Grooming Washing Hands: Supervision/set-up (seated due to LE weakness) Toileting Bladder Hygiene: Supervision/set-up (seated) Bowel Hygiene: Stand-by assistance (seated lateral weight shift) Clothing Management: Minimum assistance Cognitive Retraining Safety/Judgement: Awareness of environment; Fall prevention;Home safety Therapeutic Exercises:  
Educated on glute squeezes, knee extension, marching seated at bedside chairPain: 
Pain Scale 1: Numeric (0 - 10) Pain Intensity 1: 0 Pain Location 1: Head 
Pain Orientation 1: Mid 
Pain Description 1: Aching Pain Intervention(s) 1: Medication (see MAR) Activity Tolerance: Please refer to the flowsheet for vital signs taken during this treatment. After treatment:  
[x] Patient left in no apparent distress sitting up in chair 
[] Patient left in no apparent distress in bed 
[x] Call bell left within reach [x] Nursing notified 
[] Caregiver present [x] Bed alarm activated COMMUNICATION/COLLABORATION:  
The patients plan of care was discussed with: Physical Therapist, Registered Nurse and patient PRAVEEN Mabry/JARRED Time Calculation: 24 mins

## 2018-10-16 NOTE — PROGRESS NOTES
Nutrition Assessment: 
 
INTERVENTIONS/RECOMMENDATIONS:  
· Continue current diet · Initial/Brief Nutrition Education: Purpose of nutrition education: consistent carb diet · Consider referral to [facility] outpatient nutrition counseling, Charissa Velazquez [phone] ASSESSMENT:  
Chart reviewed. Pt report improved appetite and consuming the majority of her meals. This is confirmed by flowsheet documentation. She reports history of yo-yo dieting and request information on a healthy diet, one appropriate for diabetes. Pt was presented with verba education as well as printed material on consistent carb diet and the plate method. Diet Order: Consistent carb (2 g Na) 
% Eaten:  Patient Vitals for the past 72 hrs: 
 % Diet Eaten 10/16/18 0956 100 % 10/15/18 1818 100 % 10/15/18 1454 100 % 10/15/18 1044 10 % 10/14/18 1755 50 % 10/14/18 1323 100 % 10/14/18 1125 100 % 10/13/18 1407 100 % Pertinent Medications: [x]Reviewed: Bumex, humalog, lactobac, Mg, KCl, Pertinent Labs: [x]Reviewed:  
Food Allergies: [x]NKFA  []Other Last BM:  10/13 Edema:      []RUE   []LUE   [x]RLE 2+  [x]LLE 2+ Pressure Ulcer:      [] Stage I   [] Stage II   [] Stage III   [] Stage IV Anthropometrics: Height: 5' 9\" (175.3 cm) Weight: 104.1 kg (229 lb 8 oz) IBW (%IBW): 65.9 kg (145 lb 4.5 oz) ( ) UBW (%UBW):   (  %) BMI: Body mass index is 33.89 kg/(m^2). This BMI is indicative of: 
[]Underweight   []Normal   []Overweight   [x] Obesity   [] Extreme Obesity (BMI>40) Last Weight Metrics: 
Weight Loss Metrics 10/16/2018 9/10/2018 8/7/2018 8/1/2018 3/14/2018 2/22/2018 1/22/2018 Today's Wt 229 lb 8 oz 237 lb 233 lb 238 lb 260 lb 3 oz 263 lb 230 lb 9.6 oz BMI 33.89 kg/m2 35 kg/m2 34.41 kg/m2 35.15 kg/m2 38.42 kg/m2 38.84 kg/m2 34.05 kg/m2 Estimated Nutrition Needs (Based on): 1906 Kcals/day ([facility] ([facility] [phone])) , 76 g (1gPro/kg ABW ) Protein Carbohydrate: At Least 130 g/day  Fluids: 1906 mL/day or per primary team 
 
NUTRITION DIAGNOSES:  
Problem:  Food and nutrition-related knowledge deficit Etiology: related to consistent carb diet Signs/Symptoms: as evidenced by pt requested education Previous Nutrition Dx:  [x] Resolved  [] Unresolved           [] Progressing Problem:  Inadequate protein-energy intake Etiology: related to pt NPO 2' vent Signs/Symptoms: as evidenced by NPO + propofol meets <25% kcal and 0% protein needs. NUTRITION INTERVENTIONS: 
Meals/Snacks: General/healthful diet Enteral/Parenteral Nutrition: Initiate enteral nutrition       Initial/Brief Nutrition Education: Purpose of nutrition education GOAL:  
teach back consistent carb diet in 5-7 days NUTRITION MONITORING AND EVALUATION Food/Nutrient Intake Outcomes: Total energy intake Physical Signs/Symptoms Outcomes: GI, Glucose profile, GI profile, Electrolyte and renal profile, Weight/weight change Previous Goal Met: 
 [x] Met              [] Progressing Towards Goal              [] Not Progressing Towards Goal  
Previous Recommendations: 
 [x] Implemented          [] Not Implemented          [] Not Applicable LEARNING NEEDS (Diet, Food/Nutrient-Drug Interaction):  
 [] None Identified 
 [x] Identified and Education Provided/Documented 
 [] Identified and Pt declined/was not appropriate Cultural, Sikh, OR Ethnic Dietary Needs:  
 [x] None Identified 
 [] Identified and Addressed 
 
 [x] Interdisciplinary Care Plan Reviewed/Documented  
 [x] Discharge Planning: consistent carb, low Na diet 
 [] Participated in Interdisciplinary Rounds NUTRITION RISK:  
 [] High              [] Moderate           [x]  Low  []  Minimal/Uncompromised Ac Caldera RDN Pager [phone] Weekend Pager [phone]

## 2018-10-16 NOTE — PROGRESS NOTES
Bedside shift change report given to DIAN Rutherford (oncoming nurse). Report included the following information SBAR, Kardex, Intake/Output, MAR and Recent Results. SHIFT SUMMARY: 
 
 
 
 
 
[address] NURSING NOTE Admission Date 10/6/2018 Admission Diagnosis Acute respiratory failure ([address] Consults IP CONSULT TO CARDIOLOGY 
IP CONSULT TO [facility] PHYSICIAN Cardiac Monitoring [] Yes [] No  
  
Purposeful Hourly Rounding [x] Yes   
Ananya Score Total Score: 4 Ananya score 3 or > [x] Bed Alarm [] Avasys [] 1:1 sitter [] Patient refused (Signed refusal form in chart) Preet Score Preet Score: 18 Preet score 14 or < [] PMT consult [] Wound Care consult  
 []  Specialty bed  [] Nutrition consult Influenza Vaccine Received Flu Vaccine for Current Season (usually Sept-March): No  
 Patient/Guardian Refused (Notify MD): No  
  
Oxygen needs? [x] Room air Oxygen @  []1L    []2L    []3L   []4L    []5L   []6L via NC Chronic home O2 use? [] Yes [x] No 
Perform O2 challenge test and document in progress note using smartphrase (.Homeoxygen) Last bowel movement Last Bowel Movement Date: 10/13/18 Urinary Catheter [REMOVED] Urinary Catheter 10/06/18 Vargas-Indications for Use: Accurate measurement of urinary output [REMOVED] External Female Catheter 10/10/18-Urine Output (mL): 0 ml [REMOVED] External Female Catheter 10/12/18-Urine Output (mL): 500 ml [REMOVED] Urinary Catheter 10/06/18 Vargas-Urine Output (mL): 200 ml LDAs Peripheral IV 10/12/18 Right Antecubital (Active) Site Assessment Clean, dry, & intact 10/16/2018  7:31 PM  
Phlebitis Assessment 0 10/16/2018  7:31 PM  
Infiltration Assessment 0 10/16/2018  7:31 PM  
Dressing Status Clean, dry, & intact 10/16/2018  7:31 PM  
Dressing Type Transparent 10/16/2018  7:31 PM  
Hub Color/Line Status Pink;Flushed 10/16/2018  7:31 PM  
Alcohol Cap Used Yes 10/12/2018 10:24 AM  
                  
  
 Readmission Risk Assessment Tool Score High Risk   
      
 27 Total Score 3 Has Seen PCP in Last 6 Months (Yes=3, No=0) 2 . Living with Significant Other. Assisted Living. LTAC. SNF. or  
Rehab  
 3 Patient Length of Stay (>5 days = 3)  
 5 Pt. Coverage (Medicare=5 , Medicaid, or Self-Pay=4) 14 Charlson Comorbidity Score (Age + Comorbid Conditions) Criteria that do not apply:  
 IP Visits Last 12 Months (1-3=4, 4=9, >4=11) Expected Length of Stay 4d 2h Actual Length of Stay 10

## 2018-10-16 NOTE — PROGRESS NOTES
0700: Recieved bedside report from Holden Velazquez, off going nurse. Assumed care of patient. 0730: Pt complaining of abdominal discomfort, passing gas, feels as if she has diarrhea, but has not had a BM in 2 days. Spoke with Dr. Too Garcia, received an order for daily probiotic, and to test for Cdiff if pt has frequent loose BMs. Will continue to monitor for loose stool. 1100: Pt had a tiny smear or loose stool. 1200: Gave pt flu shot info sheet, pt would like to wait closer to discharge to receive flu shot. 1900: Bedside shift change report given to , RN (oncoming nurse). Report included the following information SBAR, Kardex, Intake/Output, MAR, Recent Results and Cardiac Rhythm Paced/afib. SHIFT SUMMARY: 
 
 
 
 
 
[address] NURSING NOTE Admission Date 10/6/2018 Admission Diagnosis Acute respiratory failure ([facility] Consults IP CONSULT TO CARDIOLOGY 
IP CONSULT TO [facility] PHYSICIAN Cardiac Monitoring [x] Yes [] No  
  
Purposeful Hourly Rounding [x] Yes   
Ananya Score Total Score: 6 Ananya score 3 or > [x] Bed Alarm [] Avasys [] 1:1 sitter [] Patient refused (Signed refusal form in chart) Preet Score Preet Score: 18 Preet score 14 or < [] PMT consult [] Wound Care consult  
 []  Specialty bed  [] Nutrition consult Influenza Vaccine Received Flu Vaccine for Current Season (usually Sept-March): No  
 Patient/Guardian Refused (Notify MD): No  
  
Oxygen needs? [x] Room air Oxygen @  []1L    []2L    []3L   []4L    []5L   []6L via NC Chronic home O2 use? [] Yes [] No 
Perform O2 challenge test and document in progress note using smartphrase (.Homeoxygen) Last bowel movement Last Bowel Movement Date: 10/13/18 Urinary Catheter [REMOVED] Urinary Catheter 10/06/18 Vargas-Indications for Use: Accurate measurement of urinary output [REMOVED] External Female Catheter 10/10/18-Urine Output (mL): 0 ml [REMOVED] External Female Catheter 10/12/18-Urine Output (mL): 500 ml [REMOVED] Urinary Catheter 10/06/18 Vargas-Urine Output (mL): 200 ml LDAs Peripheral IV 10/12/18 Right Antecubital (Active) Site Assessment Clean, dry, & intact 10/16/2018  4:00 PM  
Phlebitis Assessment 0 10/16/2018  4:00 PM  
Infiltration Assessment 0 10/16/2018  4:00 PM  
Dressing Status Clean, dry, & intact 10/16/2018  4:00 PM  
Dressing Type Transparent 10/16/2018  4:00 PM  
Hub Color/Line Status Pink;Flushed;Patent 10/16/2018  4:00 PM  
Alcohol Cap Used Yes 10/12/2018 10:24 AM  
                  
  
Readmission Risk Assessment Tool Score High Risk   
      
 27 Total Score 3 Has Seen PCP in Last 6 Months (Yes=3, No=0) 2 . Living with Significant Other. Assisted Living. LTAC. SNF. or  
Rehab  
 3 Patient Length of Stay (>5 days = 3)  
 5 Pt. Coverage (Medicare=5 , Medicaid, or Self-Pay=4) 14 Charlson Comorbidity Score (Age + Comorbid Conditions) Criteria that do not apply:  
 IP Visits Last 12 Months (1-3=4, 4=9, >4=11) Expected Length of Stay 4d 2h Actual Length of Stay 10

## 2018-10-16 NOTE — PROGRESS NOTES
Cardiology Progress Note 10/16/2018     Admit Date: 10/6/2018 Admit Diagnosis: Acute respiratory failure (HCC)  CC: none currently Cardiac Assessment/Plan:  
Ms Christine Baron has a h/o: 
1) CAD (LAD ISELA 2014 for NQMI), Neg PET for ischemia 9/2018. 
2) mixed ischemic/tachycardia mediated CM 4/2014 (EF 20%); EF 45% 11/2017. Roseann Macdonaldery was too expensive. 3) HTN, 4) PAfib (RFA 4/2016 and 1/2017), Recurrrent/persistent afib 2017/2018 5) DM,  
6) SHAYLA (on CPAP),  
7) CKD (Cr 1.5 range). Aldactone stopped in 2014. 8) [facility] Don PPM 7/2014 for bradycardia while on therapy for AFib. CAD: LAD ISELA 2014; recent PET w/o ischemia but recurrent low EF: if not improved with med Rx, may need repeat cath. CM: Echo 10/9/18: EF 15-20%. Has diuresed well; on PO Bumex 1 qday. Tolerating BBlocker @ lower dose than previous; adding back low-dose ACEi (lisinopril 2.5 qday) HTN: off metoprolol/lisinopril with low BPs after ETT/sedation: Levophed off since evening of 10/7; change to prn Jose: restarted BBlocker (toprol XL 50 qday now). Rhythm: PAFib; recurrent; remains on PO amio; Dr Eugenia Patterson rec: stablized CHF then considering AV node ablation/BiV PM or ICD. Cont anticoagulation (lovenox for now; PO after ablation plan/BiV upgrade decided). Renal function: Cr down to 1.7 from 2; (Cr 1.3 range typically). Resp failure, SHAYLA, DM, Dispo: per primary team.  
For other plans, see orders. 10/14: On Room air, cr still at 2, bumex PO restarted, amiodarone decreaesed from TID to bid, HR improved. 10/15: No orthopnea; much less STEEN; fell in bathroom yesterday (legs weak/poor balance): will need some rehab. No CP; PO bumex 1 qday; changed metoprolol to XL; added lisinopril 2.5; cont amio PO bid. 10/16: Stable cardiac status; Good UOP; Cr down to 1.47; K normal; tolerating above meds. Would decrease amio to 200 bid on d/c. Dr Eugenia Patterson to address EP plan (change lovenox to PO when appropriate). C/O GI issues (?gastro-colic reflex); ?probiotics. ______________________________________________________________________________________________ 
@ OV 10/2/18: 
Ms Miko Bonilla has a h/o: 
1) CAD (LAD ISELA 2014 for NQMI), Neg PET for ischemia 9/2018. 
2) mixed ischemic/tachycardia mediated CM 4/2014 (EF 20%); EF 45% 11/2017. German Sickle was too expensive. 3) HTN, 4) PAfib (RFA 4/2016 and 1/2017), Recurrrent/persistent afib 2017/2018 5) DM,  
6) SHAYLA (on CPAP),  
7) CKD (Cr 1.5 range). Aldactone stopped in 2014. 8) St Don PPM 7/2014 for bradycardia while on therapy for AFib. 
 
11/2017: No CP/STEEN; Back in afib today:  Coreg changed Toprol-XL. 10/2018:  Recent ER visit with epigastric pain; negative evaluation there & seen by Dr. Carlos Jackson who set up a cardiac PET. The PET shows no ischemia but EF suggested at 16%. Of note patient does have AFib with accelerated ventricular response. No recurrence of epigastric discomfort. No bleeding. Had a simple trip at home a few weeks ago without injury. IMPRESSION AND PLAN 
  
01. (I25.10) Atherosclerotic heart disease of native coronary artery without angina pectoris:  No anginal symptoms. Cont asa and stopped plavix with need for anticoagulation. We discussed the signs and symptoms of unstable angina, myocardial infarction and malignant arrhythmia. The patient knows to seek immediate medical attention should they occur. ECG done today 02. (I42.0) Dilated cardiomyopathy:  Mixed ischemic/non-ischemic (tachycardia mediated) CM. Her ejection fraction is greater than 35% after repeat echo. The patient's systolic function has been stable. I suspect the PET has a falsely low EF related to her AFib but repeat echo is needed. 2-D w/CFD Echo to be done first available. 03. (I50.42) Chronic combined systolic (congestive) and diastolic (congestive) heart failure:  Resolved exertional symptoms. (NYHA I)  The patient is compliant with their CHF regimen. 04. (I48.1) Persistent atrial fibrillation:  Management per Dr Val Cueto; currently off amio and on Pradaxa. Heart rate has been to elevated; increase Toprol  q.day. Further AFib monitoring/therapy per Dr. Javi Saunders. 05. (I13.0) Hyp hrt & chr kdny dis w hrt fail and stg 1-4/unsp chr kdny:  Adequately controlled. We will see how the patient does with the med changes noted above. 06. (E78.2) Mixed hyperlipidemia:  Lipid labs drawn by PCP. The patient is tolerating lipid lowering therapy well. 07. (N18.3) Chronic kidney disease, stage 3 (moderate):  Cr 1.24/GFR46 11/2015. Cr 1.32/GFR 43 1/2017.  
08. (R60.0) Localized edema:  Resolved. She avoids salt. 09. (E11.69) Type 2 diabetes mellitus with other specified complication:  Lipids & HTN as noted above; DM managed by other MD.  
10. (Z95.0) Presence of cardiac pacemaker:  will continue to be followed in device clinic. 11. (Z79.82) Long term (current) use of aspirin:  This condition is stable. 12. (Z79.01) Long term (current) use of anticoagulants: This condition is stable. Indicated for atrial fibrillation. No bleeding. If she has recurrent falls, she knows to call for re-evaluation of anticoagulation. 13. (Z68.38) Body mass index (BMI) 38.0-38.9, adult:  The patient was instructed on AHA diet and regular exercise. ORDERS: 
    
1 ECG done today 2 2-D w/ CFD Echocardiogram first available. 3 Return office visit with Arpita Little MD in 6 Months. 4 The patient was instructed on AHA diet and regular exercise. 10/2/18 MEDICATION LIST Medication Sig Description  
amlodipine 5 mg tablet take 1 tablet by oral route  every day per pc  
aspirin 81 mg tablet,delayed release take 1 tablet by oral route  every day  
atorvastatin 40 mg tablet take 1 tablet by oral route  every evening  
bumetanide 1 mg tablet 1 in am on tues&thursday 2 tablets on mon wed fridays Calcium 600 600 mg (1,500 mg) tablet daily clonazepam 0.5 mg tablet take 1 tablet by oral route  every day LISINOPRIL 20 MG Tablet TAKE 1 TABLET EVERY DAY Lyrica 200 mg capsule take 1 capsule by oral route 2 times every day  
magnesium 250 mg tablet take 1 tablet by oral route  every day  
metoprolol succinate  mg tablet,extended release 24 hr take 1 tablet by oral route  every day  
potassium gluconate 500 mg (83 mg) tablet daily Pradaxa 150 mg capsule take 1 capsule by oral route 2 times every day Premarin 0.625 mg/gram vaginal cream insert 0.5 applicatorful by vaginal route  every day cyclically, 3 weeks on and 1 week off  
venlafaxine 75 mg tablet 2 po bid Vitamin D3 1,000 unit capsule take 1 daily  
 
____________________________________________________________________________________________________ CARDIAC HISTORY 
CAD: 
   
1 NSTEMI [95% Proximal LAD, Resolute (ISELA) to the Proximal LAD.] - 4/8/2014 CHF/CM: 
   
1 Mixed ischemic/tachycardic CM. [Nl TSH.] - 4/2014  
2 Ischemic & tachycardic Cardiomyopathy [Echo (EF 0.45) - 06/17/2014, (prev EF 20-30%)] - 6/2014 ARRHYTHMIA: 
   
1 A Fib [DCCV, Plan: amio started; Eliquis with Effient (change eliquis to asa if NSR after 30 days). ] - 4/8/2014  
2 PAF - 8/2010  
3 A Fib, recurrent; and 6/2014. [Had marked sinus bradycardia while on bblocker/dig.] - 5/2014  
4 Sinus Bradycardia. - 6/2/2014  
5 PPM (Devices (Dual Chamber PPM ([facility])) - 7/17/2014) - 7/17/2014  
6 PAF [CVR; Eliquis restarted (plavix stopped). ] - 9/2015  
7 A Fib [RFA] - 4/2016  
8 A Fib [RFA] - 1/2017 RISK FACTORS: 
   
1 Diabetes [Diagnosed in 2014] 2 Hypertension 3 Dyslipidemia CARDIOVASCULAR PROCEDURES Procedure Date Results Cardiac PET 09/24/2018 EF 0.16 (16%), physiologic apical thinning with no ischemia Cath 04/08/2014 95% Proximal LAD, Resolute (ISELA) to the Proximal LAD. DCCV 04/08/2014 Initial Rhythm A Fib, Final Rhythm Sinus Devices 07/17/2014 Dual Chamber PPM ([employer] [facility]) Echo 2017 EF 0.45 (45%), Mild Global Hypo, Mild TR, Mild LVH, Mild MR, Est RVSP 41 mmHg, Normal RV. (probable trileaflet AoV). Echo 10/30/2015 EF 0.45 (45%), Mild LVH, LA Diam 4.1 cm, Est RVSP 35 mmHg, Normal RV. ?bicuspid AoV; (Prob trileaflet on previous ANGELITO). Echo 2014 EF 0.45 (45%), EF range 45%-50%, Mild LV dilatation. Mild LV systolic dysfunction. ? Bicuspid AoV but prob trileafet on previous ANGELITO. Echo 2014 EF 0.20 (20%), global HK with lateral sparing; no valve disease. EKG 10/02/2018 Atrial Fib, ; nonspecific T-wave flattening. Holter 2014 No Malignant Arrhythmias, Atrial Fib, No correlation with symptoms, (, [address] Holter 2014 No Malignant Arrhythmias, Atrial Fib, No correlation with symptoms, \"light or heavy chest\" = afib in 60s. HR  (ave 63). ASx pauses (upto 2.8) while asleep. RFA 2017 Indication A Fib, Final Rhythm Sinus RFA  Indication A Fib Sleep Study  OSAS: Moderate ANGELITO 2014 EF 0.35 (35%), No BRAYDON Clot, prob trileaflet AoV. ACTIVE ALLERGIES: 
Ingredient Reaction Comment [facility] is too expensive SULFA (SULFONAMIDE ANTIBIOTICS) AMOXICILLIN TRIHYDRATE    
POTASSIUM CLAVULANATE ACTIVE INTOLERANCES: 
Description Reaction Comment [facility] is too expensive PROBLEM LIST: 
Problem Description Chronic Benign essential HTN Y  
DM type 2 N A-fib Y  
CAD Y Mixed hyperlipidemia Y  
 
 
PAST MEDICAL/SURGICAL HISTORY  (Detailed) Disease/disorder Onset Date Management Date Comments  
  hysterectomy A-FIB Cardiovascular disease CHF Hyperlipidemia Hypertension SHAYLA: moderate. 2014 Family History  (Detailed) Relationship Family Member Name  Age at Death Condition Onset Age Cause of Death Brother    Hypertension  N Mother    PAD  N Mother    Hypertension  N Mother    Cardiac arrhythmias  N  
 Sister    Diabetes mellitus  N  
 
SOCIAL HISTORY  (Detailed) Preferred language is [place]. EDUCATION/EMPLOYMENT/OCCUPATION Employment History Status Retired Restrictions  
  retired    
  retired MARITAL STATUS/FAMILY/SOCIAL SUPPORT Currently . For other plans, see orders. Hospital problem list  
Active Hospital Problems Diagnosis Date Noted  AYLIN (acute kidney injury) ([facility] [address] 10/13/2018 Suspect prerenal 
  
 Atrial fibrillation with rapid ventricular response ([address] 10/07/2018  Hypokalemia 10/07/2018  Acute respiratory failure with hypoxemia ([address] 10/06/2018  Type 2 diabetes mellitus with diabetic neuropathy, without long-term current use of insulin ([facility] [address] 2016  Acute systolic heart failure ([address] 2014 Subjective: Jose Dominguez reports Chest pain X none  consistent with:  Non-cardiac CP Atypical CP None now  On going  Anginal CP Dyspnea  none  at rest  with exertion    
   x improved  unchanged  worse PND X none  overnight Orthopnea X none  improved  unchanged  worse Presyncope X none  improved  unchanged  worse Ambulated in hallway without symptoms   Yes Ambulated in room without symptoms  Yes Objective:  
 Physical Exam: 
Overall VSSAF;   
Visit Vitals  /58 (BP 1 Location: Left arm, BP Patient Position: Supine)  Pulse 62  Temp 97.6 °F (36.4 °C)  Resp 18  Ht 5' 9\" (1.753 m)  Wt 104.1 kg (229 lb 8 oz)  SpO2 98%  Breastfeeding No  
 BMI 33.89 kg/m2 Temp (24hrs), Av.9 °F (36.6 °C), Min:97.6 °F (36.4 °C), Max:98.4 °F (36.9 °C) Patient Vitals for the past 8 hrs: 
 Pulse 10/16/18 0732 62  
10/16/18 0323 85 Patient Vitals for the past 8 hrs: 
 Resp 10/16/18 0732 18  
10/16/18 0323 18 Patient Vitals for the past 8 hrs: 
 BP  
10/16/18 0732 112/58 10/16/18 0323 101/67 Intake/Output Summary (Last 24 hours) at 10/16/18 6899 Last data filed at 10/16/18 4938 Gross per 24 hour Intake             1040 ml Output             3000 ml Net            -1960 ml General Appearance: Well developed, well nourished, no acute distress. Ears/Nose/Mouth/Throat:   Normal MM; anicteric. JVP: WNL Resp:   clear to auscultation bilaterally. Nl resp effort. Cardiovascular:  IRRR, S1, S2 normal, no new murmur. No gallop or rub. Abdomen:   Soft, non-tender, bowel sounds are present. Extremities: Min edema bilaterally. Skin: 
Neuro: Warm and dry. A/O x3, grossly nonfocal  
cath site intact w/o hematoma or new bruit; distal pulse unchanged  Yes Data Review:    
Telemetry independently reviewed  sinus x chronic afib  parox afib  NSVT  
 
ECG independently reviewed  NSR  afib  no significant changes  NSST-Tw chgs  
x no new ECG provided for review Lab results reviewed as noted below. Current medications reviewed as noted below. No results for input(s): PH, PCO2, PO2 in the last 72 hours. No results for input(s): CPK, CKMB, CKNDX, TROIQ in the last 72 hours. Recent Labs 10/16/18 
 0259  10/15/18 
 0220  10/14/18 
 4718 NA  144  142  139  
K  4.2  3.8  3.5 CL  111*  110*  106 CO2  29  26  25 BUN  38*  56*  61* CREA  1.47*  1.72*  2.13* GFRAA  43*  36*  28* GLU  87  119*  119* CA  8.1*  8.4*  8.6 WBC   --   9.9  12.2* HGB   --   11.7  13.3 HCT   --   36.3  41.2 PLT   --   216  306 Lab Results Component Value Date/Time Cholesterol, total 104 08/01/2018 10:17 AM  
 HDL Cholesterol 33 (L) 08/01/2018 10:17 AM  
 LDL, calculated 49 08/01/2018 10:17 AM  
 Triglyceride 110 08/01/2018 10:17 AM  
 CHOL/HDL Ratio 4.7 04/03/2014 03:30 PM  
 
No results for input(s): SGOT, GPT, AP, TBIL, TP, ALB, GLOB, GGT, AML, LPSE in the last 72 hours. No lab exists for component: AMYP, HLPSE No results for input(s): INR, PTP, APTT in the last 72 hours. No lab exists for component: INREXT, INREXT No components found for: [place] Current Facility-Administered Medications Medication Dose Route Frequency  metoprolol succinate (TOPROL-XL) XL tablet 50 mg  50 mg Oral DAILY  lisinopril (PRINIVIL, ZESTRIL) tablet 2.5 mg  2.5 mg Oral DAILY  bumetanide (BUMEX) tablet 1 mg  1 mg Oral DAILY  potassium chloride SR (KLOR-CON 10) tablet 10 mEq  10 mEq Oral DAILY  amiodarone (CORDARONE) tablet 400 mg  400 mg Oral BID  influenza vaccine 2018-19 (6 mos+)(PF) (FLUARIX QUAD/FLULAVAL QUAD) injection 0.5 mL  0.5 mL IntraMUSCular PRIOR TO DISCHARGE  enoxaparin (LOVENOX) injection 100 mg  1 mg/kg SubCUTAneous Q24H  
 conjugated estrogens (PREMARIN) 0.625 mg/gram vaginal cream 0.5 g  0.5 g Vaginal EVERY TU & FRI  insulin lispro (HUMALOG) injection   SubCUTAneous AC&HS  
 glucose chewable tablet 16 g  4 Tab Oral PRN  
 dextrose (D50W) injection syrg 12.5-25 g  12.5-25 g IntraVENous PRN  
 glucagon (GLUCAGEN) injection 1 mg  1 mg IntraMUSCular PRN  
 venlafaxine-SR (EFFEXOR-XR) capsule 150 mg  150 mg Oral BID  magnesium oxide (MAG-OX) tablet 400 mg  400 mg Oral DAILY  pregabalin (LYRICA) capsule 200 mg  200 mg Oral BID  aspirin chewable tablet 81 mg  81 mg Oral DAILY  atorvastatin (LIPITOR) tablet 40 mg  40 mg Oral QHS  sodium chloride (NS) flush 5-10 mL  5-10 mL IntraVENous Q8H  
 sodium chloride (NS) flush 5-10 mL  5-10 mL IntraVENous PRN  
 acetaminophen (TYLENOL) tablet 650 mg  650 mg Oral Q6H PRN  
 docusate sodium (COLACE) capsule 100 mg  100 mg Oral DAILY PRN Almita Mccormack MD

## 2018-10-17 LAB
ANION GAP SERPL CALC-SCNC: 6 MMOL/L (ref 5–15)
BUN SERPL-MCNC: 33 MG/DL (ref 6–20)
BUN/CREAT SERPL: 22 (ref 12–20)
CALCIUM SERPL-MCNC: 8.1 MG/DL (ref 8.5–10.1)
CHLORIDE SERPL-SCNC: 110 MMOL/L (ref 97–108)
CO2 SERPL-SCNC: 27 MMOL/L (ref 21–32)
CREAT SERPL-MCNC: 1.5 MG/DL (ref 0.55–1.02)
ERYTHROCYTE [DISTWIDTH] IN BLOOD BY AUTOMATED COUNT: 18.5 % (ref 11.5–14.5)
GLUCOSE BLD STRIP.AUTO-MCNC: 103 MG/DL (ref 65–100)
GLUCOSE BLD STRIP.AUTO-MCNC: 84 MG/DL (ref 65–100)
GLUCOSE BLD STRIP.AUTO-MCNC: 92 MG/DL (ref 65–100)
GLUCOSE BLD STRIP.AUTO-MCNC: 99 MG/DL (ref 65–100)
GLUCOSE SERPL-MCNC: 95 MG/DL (ref 65–100)
HCT VFR BLD AUTO: 33.8 % (ref 35–47)
HGB BLD-MCNC: 10.6 G/DL (ref 11.5–16)
MCH RBC QN AUTO: 28.7 PG (ref 26–34)
MCHC RBC AUTO-ENTMCNC: 31.4 G/DL (ref 30–36.5)
MCV RBC AUTO: 91.6 FL (ref 80–99)
NRBC # BLD: 0 K/UL (ref 0–0.01)
NRBC BLD-RTO: 0 PER 100 WBC
PLATELET # BLD AUTO: 150 K/UL (ref 150–400)
PMV BLD AUTO: 12.5 FL (ref 8.9–12.9)
POTASSIUM SERPL-SCNC: 4 MMOL/L (ref 3.5–5.1)
RBC # BLD AUTO: 3.69 M/UL (ref 3.8–5.2)
SERVICE CMNT-IMP: ABNORMAL
SERVICE CMNT-IMP: NORMAL
SODIUM SERPL-SCNC: 143 MMOL/L (ref 136–145)
WBC # BLD AUTO: 5.9 K/UL (ref 3.6–11)

## 2018-10-17 PROCEDURE — 94760 N-INVAS EAR/PLS OXIMETRY 1: CPT

## 2018-10-17 PROCEDURE — 74011250637 HC RX REV CODE- 250/637: Performed by: INTERNAL MEDICINE

## 2018-10-17 PROCEDURE — 36415 COLL VENOUS BLD VENIPUNCTURE: CPT | Performed by: INTERNAL MEDICINE

## 2018-10-17 PROCEDURE — 85027 COMPLETE CBC AUTOMATED: CPT | Performed by: INTERNAL MEDICINE

## 2018-10-17 PROCEDURE — 97116 GAIT TRAINING THERAPY: CPT

## 2018-10-17 PROCEDURE — 80048 BASIC METABOLIC PNL TOTAL CA: CPT | Performed by: INTERNAL MEDICINE

## 2018-10-17 PROCEDURE — 90471 IMMUNIZATION ADMIN: CPT

## 2018-10-17 PROCEDURE — 82962 GLUCOSE BLOOD TEST: CPT

## 2018-10-17 PROCEDURE — 90686 IIV4 VACC NO PRSV 0.5 ML IM: CPT | Performed by: INTERNAL MEDICINE

## 2018-10-17 PROCEDURE — 65660000000 HC RM CCU STEPDOWN

## 2018-10-17 PROCEDURE — 97535 SELF CARE MNGMENT TRAINING: CPT | Performed by: OCCUPATIONAL THERAPIST

## 2018-10-17 PROCEDURE — 97110 THERAPEUTIC EXERCISES: CPT

## 2018-10-17 PROCEDURE — 74011250636 HC RX REV CODE- 250/636: Performed by: INTERNAL MEDICINE

## 2018-10-17 RX ADMIN — ATORVASTATIN CALCIUM 40 MG: 40 TABLET, FILM COATED ORAL at 21:33

## 2018-10-17 RX ADMIN — PREGABALIN 200 MG: 100 CAPSULE ORAL at 18:00

## 2018-10-17 RX ADMIN — VENLAFAXINE HYDROCHLORIDE 150 MG: 150 CAPSULE, EXTENDED RELEASE ORAL at 18:01

## 2018-10-17 RX ADMIN — POTASSIUM CHLORIDE 10 MEQ: 750 TABLET, FILM COATED, EXTENDED RELEASE ORAL at 09:04

## 2018-10-17 RX ADMIN — ASPIRIN 81 MG CHEWABLE TABLET 81 MG: 81 TABLET CHEWABLE at 09:05

## 2018-10-17 RX ADMIN — Medication 400 MG: at 09:03

## 2018-10-17 RX ADMIN — LISINOPRIL 2.5 MG: 5 TABLET ORAL at 18:00

## 2018-10-17 RX ADMIN — AMIODARONE HYDROCHLORIDE 400 MG: 200 TABLET ORAL at 18:00

## 2018-10-17 RX ADMIN — Medication 10 ML: at 18:01

## 2018-10-17 RX ADMIN — VENLAFAXINE HYDROCHLORIDE 150 MG: 150 CAPSULE, EXTENDED RELEASE ORAL at 09:03

## 2018-10-17 RX ADMIN — Medication 10 ML: at 00:16

## 2018-10-17 RX ADMIN — METOPROLOL SUCCINATE 50 MG: 50 TABLET, EXTENDED RELEASE ORAL at 09:03

## 2018-10-17 RX ADMIN — Medication 1 CAPSULE: at 09:05

## 2018-10-17 RX ADMIN — AMIODARONE HYDROCHLORIDE 400 MG: 200 TABLET ORAL at 09:05

## 2018-10-17 RX ADMIN — BUMETANIDE 1 MG: 1 TABLET ORAL at 09:05

## 2018-10-17 RX ADMIN — PREGABALIN 200 MG: 100 CAPSULE ORAL at 09:03

## 2018-10-17 RX ADMIN — ENOXAPARIN SODIUM 100 MG: 100 INJECTION, SOLUTION INTRAVENOUS; SUBCUTANEOUS at 09:05

## 2018-10-17 RX ADMIN — INFLUENZA VIRUS VACCINE 0.5 ML: 15; 15; 15; 15 SUSPENSION INTRAMUSCULAR at 09:06

## 2018-10-17 RX ADMIN — Medication 10 ML: at 21:33

## 2018-10-17 NOTE — PROGRESS NOTES
Physical Therapy Goals Initiated 10/11/2018 1. Patient will move from supine to sit and sit to supine , scoot up and down and roll side to side in bed with independence within 7 day(s). 2. Patient will transfer from bed to chair and chair to bed with modified independence using the least restrictive device within 7 day(s). 3. Patient will perform sit to stand with modified independence within 7 day(s). 4. Patient will ambulate with modified independence for 250 feet with the least restrictive device within 7 day(s). 5. Patient will ascend/descend 3 stairs with 1 handrail(s) with supervision/set-up within 7 day(s). physical Therapy TREATMENT Patient: Sasha Dunn (69 y.o. female) Date: 10/17/2018 Diagnosis: Acute respiratory failure ([address] Acute respiratory failure with hypoxemia (HCC) Precautions: Fall Chart, physical therapy assessment, plan of care and goals were reviewed. ASSESSMENT: 
Pt presented sitting on BS, pleasant and agreeable to therapy. Pt able to transfer off BSC with CGA and perform wiping independently, with materials provided. VSS throughout session, with positional changes and pre/post activity. Pt provided education regarding symptoms and etiology of orthostatic hypotension. Pt then able to complete sit<>stands throughout session with SBA. Pt amb with RW and  ft (4 laps within room) with gait deviations listed below. Pt with one mild instance of knee buckling during lap 3, pt able to self correct and maintain balance with use of RW. After 4 laps pt with increased fatigue, requesting seated rest break. Pt then completed continuous sit to stand exercise with eccentric control on descent for 3 min, exercise stopped when pt unable to control descent adequately.  After short seated rest break, pt participated in manual resistance LE exercises, with resistance applied in both directions for Hip flexion/extension, Knee flexion/extension, and Hip abduction/adduction. Pt continued with reps of each until decline in form/fluiditiy of movement. To end session pt seated in chair, all needs met, RN aware. At discharge pt will continue to benefit from attending inpatient rehab, and will do well with the increased challenge, as demonstrated by the gradual increase in activity tolerance with each visit during her acute stay. Progression toward goals: 
[x]    Improving appropriately and progressing toward goals 
[]    Improving slowly and progressing toward goals 
[]    Not making progress toward goals and plan of care will be adjusted PLAN: 
Patient continues to benefit from skilled intervention to address the above impairments. Continue treatment per established plan of care. Discharge Recommendations:  Inpatient Rehab Further Equipment Recommendations for Discharge:  TBD by Inpatient Rehab SUBJECTIVE:  
Patient stated I love yellow roses, they're my favorite.  OBJECTIVE DATA SUMMARY:  
Critical Behavior: 
Neurologic State: Alert Orientation Level: Oriented X4 Cognition: Appropriate decision making, Appropriate for age attention/concentration, Appropriate safety awareness Safety/Judgement: Awareness of environment, Fall prevention, Home safety Functional Mobility Training: 
Bed Mobility: Not observed as pt was received and returned to sitting Transfers: 
Sit to Stand: Stand-by assistance Stand to Sit: Stand-by assistance Balance: 
Sitting: Intact Standing: Impaired Standing - Static: Fair Standing - Dynamic : Fair Ambulation/Gait Training: 
Distance (ft): 120 Feet (ft)(30 ft in room x4 laps) Assistive Device: Walker, rolling;Gait belt Ambulation - Level of Assistance: Contact guard assistance Gait Description (WDL): Exceptions to [facility] Gait Abnormalities: Decreased step clearance Base of Support: Widened Speed/Brielle: Pace decreased (<100 feet/min) Step Length: Left shortened;Right shortened Therapeutic Exercises:  
Sit to stand with emphasis on eccentric control with descent, as many reps as possible at pt selected rate for 3 min Manual resistance  With concentric and eccentric Hip Flexion/extension (6 reps each), Knee flexion/extension (8 reps each), Hip abduction/adduction (9 reps each). Pt continued with manual resistance exercise reps until decline in form/fluiditiy of movement Activity Tolerance: Pt with good activity tolerance, able to tolerate increase in activity well Please refer to the flowsheet for vital signs taken during this treatment. After treatment:  
[x]    Patient left in no apparent distress sitting up in chair 
[]    Patient left in no apparent distress in bed 
[x]    Call bell left within reach [x]    Nursing notified 
[]    Caregiver present [x]    Bed alarm activated COMMUNICATION/COLLABORATION:  
The patients plan of care was discussed with: Registered Nurse KASANDRA Saucedo Time Calculation: 28 mins Regarding student involvement in patient care: A student participated in this treatment session. Per CMS Medicare statements and APTA guidelines I certify that the following was true: 1. I was present and directly observed the entire session. 2. I made all skilled judgments and clinical decisions regarding care. 3. I am the practitioner responsible for assessment, treatment, and documentation.

## 2018-10-17 NOTE — PROGRESS NOTES
[address] [place] [address] 
[phone] Medical Progress Note NAME: Ericka Avila :  1950 MRM:  [id number] Date/Time: 10/17/2018  6:15 AM 
  
Subjective:  
 
Admitted with acute respiratory failure with hypoxemia due to systolic CHF associated with atrial fib with RVR. Bedside EF 15-20%. Chest xray showed moderate CHF. Respiratory failure rapidly progressed with subsequent intubation. Patient on pressors for short period of time. Afib controlled with amiodarone with exacerbations due to hypokalemia related to diuresis. Successfully extubated  complicated by stridor. Patient currently awake,oriented x3. Currently in afib 70-90's . O2 sat's adequate  K+ 4.0. Renal function improved with creatinine of 1.5. Patient denies diarrhea - probiotic started yesterday. Past Medical History:  
Diagnosis Date  Anxiety 2018  Arthritis OA  Asthma  Diabetes ([facility]  Essential hypertension  GERD (gastroesophageal reflux disease)  Hypercholesterolemia 2018  Hypertension  Long-term use of high-risk medication 2018  Neuropathy  Other ill-defined conditions(799.89) IBS, spinal stenosis  Plantar fasciitis  Psychiatric disorder   
 depression, anxiety  Sleep apnea   
 uses CPAP  Type 2 diabetes mellitus with diabetic neuropathy, without long-term current use of insulin ([facility] [address] 2016 ROS:  General: postive for weakness Respiratory: negative for cough, congestion Cardiology:  negative for chest pain, palpitations, 
Gastrointestinal: negative for abdominal pain, N/V Muskuloskeletal : negative for arthralgia, myalgia Neurological: negative for headache, dizziness or focal deficits Psychological: positive for less anxiety Review of systems otherwise negative Objective:  
 
 
Vitals:  
 
  
Last 24hrs VS reviewed since prior progress note. Most recent are: Visit Vitals /70 (BP 1 Location: Left arm, BP Patient Position: At rest) Pulse 74 Temp 98.1 °F (36.7 °C) Resp 18 Ht 5' 9\" (1.753 m) Wt 259 lb 14.8 oz (117.9 kg) SpO2 98% Breastfeeding? No  
BMI 38.38 kg/m² SpO2 Readings from Last 6 Encounters:  
10/17/18 98% 09/10/18 96% 08/07/18 94% 08/01/18 96% 03/14/18 96% 02/22/18 97% O2 Flow Rate (L/min): 2 l/min Intake/Output Summary (Last 24 hours) at 10/17/2018 5318 Last data filed at 10/17/2018 4261 Gross per 24 hour Intake 1270 ml Output 1650 ml Net -380 ml Telemetry reviewed:   afib Tubes:   Vargas, central line X-Ray:  Admission chest xray - moderated CHF Procedures:   Echo - Left ventricle: Ejection fraction was estimated in the range of 15 % to 20 
%. There was diffuse hypokinesis. Mitral valve: There was mild regurgitation. Exam:  
 
General   well developed, well nourished, appears stated age in no respiratory distress Neck   Supple without nodes or mass. No thyromegaly or bruit Respiratory   Lungs much clearer Cardiology  RRR without murmur Abdominal  Soft, non-tender, non-distended, positive bowel sounds, no hepatosplenomegaly Extremities  Without LE edema Skin  Normal skin turgor. No rashes or skin ulcers noted Neurological No focal deficit noted Psychological  Alert, oriented x3 Exam otherwise negative Lab Data Reviewed: (see below) Medications Reviewed: (see below) 
 
______________________________________________________________________ Medications:  
 
Current Facility-Administered Medications Medication Dose Route Frequency  lactobac ac& pc-s.therm-b.anim (JONNIE Q/RISAQUAD)  1 Cap Oral DAILY  metoprolol succinate (TOPROL-XL) XL tablet 50 mg  50 mg Oral DAILY  lisinopril (PRINIVIL, ZESTRIL) tablet 2.5 mg  2.5 mg Oral DAILY  bumetanide (BUMEX) tablet 1 mg  1 mg Oral DAILY  potassium chloride SR (KLOR-CON 10) tablet 10 mEq  10 mEq Oral DAILY  amiodarone (CORDARONE) tablet 400 mg  400 mg Oral BID  influenza vaccine 2018-19 (6 mos+)(PF) (FLUARIX QUAD/FLULAVAL QUAD) injection 0.5 mL  0.5 mL IntraMUSCular PRIOR TO DISCHARGE  enoxaparin (LOVENOX) injection 100 mg  1 mg/kg SubCUTAneous Q24H  
 conjugated estrogens (PREMARIN) 0.625 mg/gram vaginal cream 0.5 g  0.5 g Vaginal EVERY TU & FRI  insulin lispro (HUMALOG) injection   SubCUTAneous AC&HS  
 glucose chewable tablet 16 g  4 Tab Oral PRN  
 dextrose (D50W) injection syrg 12.5-25 g  12.5-25 g IntraVENous PRN  
 glucagon (GLUCAGEN) injection 1 mg  1 mg IntraMUSCular PRN  
 venlafaxine-SR (EFFEXOR-XR) capsule 150 mg  150 mg Oral BID  magnesium oxide (MAG-OX) tablet 400 mg  400 mg Oral DAILY  pregabalin (LYRICA) capsule 200 mg  200 mg Oral BID  aspirin chewable tablet 81 mg  81 mg Oral DAILY  atorvastatin (LIPITOR) tablet 40 mg  40 mg Oral QHS  sodium chloride (NS) flush 5-10 mL  5-10 mL IntraVENous Q8H  
 sodium chloride (NS) flush 5-10 mL  5-10 mL IntraVENous PRN  
 acetaminophen (TYLENOL) tablet 650 mg  650 mg Oral Q6H PRN  
 docusate sodium (COLACE) capsule 100 mg  100 mg Oral DAILY PRN Lab Review:  
 
Recent Labs 10/17/18 
0326 10/15/18 
0220 WBC 5.9 9.9 HGB 10.6* 11.7 HCT 33.8* 36.3  216 Recent Labs 10/17/18 
1604 10/16/18 
0259 10/15/18 
0220  144 142  
K 4.0 4.2 3.8 * 111* 110* CO2 27 29 26 GLU 95 87 119* BUN 33* 38* 56* CREA 1.50* 1.47* 1.72* CA 8.1* 8.1* 8.4* Lab Results Component Value Date/Time Glucose (POC) 113 (H) 10/16/2018 08:46 PM  
 Glucose (POC) 93 10/16/2018 04:50 PM  
 Glucose (POC) 147 (H) 10/16/2018 11:21 AM  
 Glucose (POC) 107 (H) 10/16/2018 07:30 AM  
 Glucose (POC) 130 (H) 10/15/2018 09:02 PM  
 
No results for input(s): PH, PCO2, PO2, HCO3, FIO2 in the last 72 hours. No results for input(s): INR in the last 72 hours.  
 
No lab exists for component: INREXT, INREXT 
 
 
  
 Assessment:  
 
Principal Problem: 
  Acute respiratory failure with hypoxemia ([facility] [address] (10/6/2018) Active Problems: 
  Type 2 diabetes mellitus with diabetic neuropathy, without long-term current use of insulin ([address] (6/5/2016) Acute systolic heart failure ([address] (4/4/2014) Atrial fibrillation with rapid ventricular response ([address] (10/7/2018) Hypokalemia (10/7/2018) AYLIN (acute kidney injury) ([facility] [address] (10/13/2018) Overview: Suspect prerenal 
 
 
 
  
Plan:  
 
Risk of deterioration: high 1. Continue O2 as needed 2. Continue oral bumex, amiodarone 3. Continue oral KCl, Mg++ replacement. 4. Cardiology following 5. Follow labs,sat's 6. Probable cardiology intervention this week as noted 7. Awaiting decision regarding inpatient rehab at [facility] when discharged Care Plan discussed with: Nursing Staff, patient Discussed:  Care Plan Prophylaxis:  Lovenox and SCD's Disposition:  Unknown 
        
___________________________________________________ Attending Physician: Keisha Vega MD

## 2018-10-17 NOTE — PROGRESS NOTES
Cardiology Progress Note 10/17/2018     Admit Date: 10/6/2018 Admit Diagnosis: Acute respiratory failure (HCC)  CC: none currently Cardiac Assessment/Plan:  
Ms Alvin Ybarra has a h/o: 
1) CAD (LAD ISELA 2014 for NQMI), Neg PET for ischemia 9/2018. 
2) mixed ischemic/tachycardia mediated CM 4/2014 (EF 20%); EF 45% 11/2017. Arjun Escort was too expensive. 3) HTN, 4) PAfib (RFA 4/2016 and 1/2017), Recurrrent/persistent afib 2017/2018 5) DM,  
6) SHAYLA (on CPAP),  
7) CKD (Cr 1.5 range). Aldactone stopped in 2014. 8) St Don PPM 7/2014 for bradycardia while on therapy for AFib. CAD: LAD ISELA 2014; recent PET w/o ischemia but recurrent low EF: if not improved with med Rx, may need repeat cath. CM: Echo 10/9/18: EF 15-20%. Has diuresed well; on PO Bumex 1 qday. Tolerating BBlocker @ lower dose than previous; adding back low-dose ACEi (lisinopril 2.5 qday) HTN: off metoprolol/lisinopril with low BPs after ETT/sedation: Levophed off since evening of 10/7; change to prn Jose: restarted BBlocker (toprol XL 50 qday now). Rhythm: PAFib; recurrent; remains on PO amio; Dr Aston Ruiz rec: stablized CHF then considering AV node ablation/BiV PM or ICD. Cont anticoagulation (lovenox for now; PO after ablation plan/BiV upgrade decided). Renal function: Cr down to 1.7 from 2; (Cr 1.3 range typically). Resp failure, SHAYLA, DM, Dispo: per primary team.  
For other plans, see orders. 10/14: On Room air, cr still at 2, bumex PO restarted, amiodarone decreaesed from TID to bid, HR improved. 10/15: No orthopnea; much less STEEN; fell in bathroom yesterday (legs weak/poor balance): will need some rehab. No CP; PO bumex 1 qday; changed metoprolol to XL 50 qday; added lisinopril 2.5; cont amio PO bid. 10/16: Stable cardiac status; Good UOP; Cr down to 1.47; K normal; tolerating above meds. Would decrease amio to 200 bid on d/c.  Dr Aston Ruiz to address EP plan (change lovenox to PO when appropriate). C/O GI issues (?gastro-colic reflex); ?probiotics. 10/17: Stable cardiac status; Cr stable at 1.5; Tolerating meds; EP plan per Dr Иван Pizano. 
 
______________________________________________________________________________________________ 
@ OV 10/2/18: 
Ms Janice Schwab has a h/o: 
1) CAD (LAD ISELA 2014 for NQMI), Neg PET for ischemia 9/2018. 
2) mixed ischemic/tachycardia mediated CM 4/2014 (EF 20%); EF 45% 11/2017. Calos Fragmin was too expensive. 3) HTN, 4) PAfib (RFA 4/2016 and 1/2017), Recurrrent/persistent afib 2017/2018 5) DM,  
6) SHAYLA (on CPAP),  
7) CKD (Cr 1.5 range). Aldactone stopped in 2014. 8) St Don PPM 7/2014 for bradycardia while on therapy for AFib. 
 
11/2017: No CP/STEEN; Back in afib today:  Coreg changed Toprol-XL. 10/2018:  Recent ER visit with epigastric pain; negative evaluation there & seen by Dr. Marina Baum who set up a cardiac PET. The PET shows no ischemia but EF suggested at 16%. Of note patient does have AFib with accelerated ventricular response. No recurrence of epigastric discomfort. No bleeding. Had a simple trip at home a few weeks ago without injury. IMPRESSION AND PLAN 
  
01. (I25.10) Atherosclerotic heart disease of native coronary artery without angina pectoris:  No anginal symptoms. Cont asa and stopped plavix with need for anticoagulation. We discussed the signs and symptoms of unstable angina, myocardial infarction and malignant arrhythmia. The patient knows to seek immediate medical attention should they occur. ECG done today 02. (I42.0) Dilated cardiomyopathy:  Mixed ischemic/non-ischemic (tachycardia mediated) CM. Her ejection fraction is greater than 35% after repeat echo. The patient's systolic function has been stable. I suspect the PET has a falsely low EF related to her AFib but repeat echo is needed. 2-D w/CFD Echo to be done first available. 03. (I50.42) Chronic combined systolic (congestive) and diastolic (congestive) heart failure:  Resolved exertional symptoms. (NYHA I)  The patient is compliant with their CHF regimen. 04. (I48.1) Persistent atrial fibrillation:  Management per Dr Radha Cerda; currently off amio and on Pradaxa. Heart rate has been to elevated; increase Toprol  q.day. Further AFib monitoring/therapy per Dr. Saray Christiansen. 05. (I13.0) Hyp hrt & chr kdny dis w hrt fail and stg 1-4/unsp chr kdny:  Adequately controlled. We will see how the patient does with the med changes noted above. 06. (E78.2) Mixed hyperlipidemia:  Lipid labs drawn by PCP. The patient is tolerating lipid lowering therapy well. 07. (N18.3) Chronic kidney disease, stage 3 (moderate):  Cr 1.24/GFR46 11/2015. Cr 1.32/GFR 43 1/2017.  
08. (R60.0) Localized edema:  Resolved. She avoids salt. 09. (E11.69) Type 2 diabetes mellitus with other specified complication:  Lipids & HTN as noted above; DM managed by other MD.  
10. (Z95.0) Presence of cardiac pacemaker:  will continue to be followed in device clinic. 11. (Z79.82) Long term (current) use of aspirin:  This condition is stable. 12. (Z79.01) Long term (current) use of anticoagulants: This condition is stable. Indicated for atrial fibrillation. No bleeding. If she has recurrent falls, she knows to call for re-evaluation of anticoagulation. 13. (Z68.38) Body mass index (BMI) 38.0-38.9, adult:  The patient was instructed on AHA diet and regular exercise. ORDERS: 
    
1 ECG done today 2 2-D w/ CFD Echocardiogram first available. 3 Return office visit with Donny Little MD in 6 Months. 4 The patient was instructed on AHA diet and regular exercise. 10/2/18 MEDICATION LIST Medication Sig Description  
amlodipine 5 mg tablet take 1 tablet by oral route  every day per pc  
aspirin 81 mg tablet,delayed release take 1 tablet by oral route  every day atorvastatin 40 mg tablet take 1 tablet by oral route  every evening  
bumetanide 1 mg tablet 1 in am on tues&thursday 2 tablets on mon wed fridays Calcium 600 600 mg (1,500 mg) tablet daily  
clonazepam 0.5 mg tablet take 1 tablet by oral route  every day LISINOPRIL 20 MG Tablet TAKE 1 TABLET EVERY DAY Lyrica 200 mg capsule take 1 capsule by oral route 2 times every day  
magnesium 250 mg tablet take 1 tablet by oral route  every day  
metoprolol succinate  mg tablet,extended release 24 hr take 1 tablet by oral route  every day  
potassium gluconate 500 mg (83 mg) tablet daily Pradaxa 150 mg capsule take 1 capsule by oral route 2 times every day Premarin 0.625 mg/gram vaginal cream insert 0.5 applicatorful by vaginal route  every day cyclically, 3 weeks on and 1 week off  
venlafaxine 75 mg tablet 2 po bid Vitamin D3 1,000 unit capsule take 1 daily  
 
____________________________________________________________________________________________________ CARDIAC HISTORY 
CAD: 
   
1 NSTEMI [95% Proximal LAD, Resolute (ISELA) to the Proximal LAD.] - 4/8/2014 CHF/CM: 
   
1 Mixed ischemic/tachycardic CM. [Nl TSH.] - 4/2014  
2 Ischemic & tachycardic Cardiomyopathy [Echo (EF 0.45) - 06/17/2014, (prev EF 20-30%)] - 6/2014 ARRHYTHMIA: 
   
1 A Fib [DCCV, Plan: amio started; Eliquis with Effient (change eliquis to asa if NSR after 30 days). ] - 4/8/2014  
2 PAF - 8/2010  
3 A Fib, recurrent; and 6/2014. [Had marked sinus bradycardia while on bblocker/dig.] - 5/2014  
4 Sinus Bradycardia. - 6/2/2014  
5 PPM (Devices (Dual Chamber PPM (Starlett Child)) - 7/17/2014) - 7/17/2014  
6 PAF [CVR; Eliquis restarted (plavix stopped). ] - 9/2015  
7 A Fib [RFA] - 4/2016  
8 A Fib [RFA] - 1/2017 RISK FACTORS: 
   
1 Diabetes [Diagnosed in 2014] 2 Hypertension 3 Dyslipidemia CARDIOVASCULAR PROCEDURES Procedure Date Results Cardiac PET 09/24/2018 EF 0.16 (16%), physiologic apical thinning with no ischemia Cath 04/08/2014 95% Proximal LAD, Resolute (ISELA) to the Proximal LAD. DCCV 04/08/2014 Initial Rhythm A Fib, Final Rhythm Sinus Devices 07/17/2014 Dual Chamber PPM (Dennys Sheehan) Echo 11/28/2017 EF 0.45 (45%), Mild Global Hypo, Mild TR, Mild LVH, Mild MR, Est RVSP 41 mmHg, Normal RV. (probable trileaflet AoV). Echo 10/30/2015 EF 0.45 (45%), Mild LVH, LA Diam 4.1 cm, Est RVSP 35 mmHg, Normal RV. ?bicuspid AoV; (Prob trileaflet on previous ANGELITO). Echo 06/17/2014 EF 0.45 (45%), EF range 45%-50%, Mild LV dilatation. Mild LV systolic dysfunction. ? Bicuspid AoV but prob trileafet on previous ANGELITO. Echo 04/03/2014 EF 0.20 (20%), global HK with lateral sparing; no valve disease. EKG 10/02/2018 Atrial Fib, ; nonspecific T-wave flattening. Holter 06/09/2014 No Malignant Arrhythmias, Atrial Fib, No correlation with symptoms, (, [address] Holter 04/30/2014 No Malignant Arrhythmias, Atrial Fib, No correlation with symptoms, \"light or heavy chest\" = afib in 60s. HR  (ave 63). ASx pauses (upto 2.8) while asleep. RFA 01/19/2017 Indication A Fib, Final Rhythm Sinus RFA  Indication A Fib Sleep Study  OSAS: Moderate ANGELITO 04/08/2014 EF 0.35 (35%), No BRAYDON Clot, prob trileaflet AoV. ACTIVE ALLERGIES: 
Ingredient Reaction Comment SACUBITRIL [place] Escort is too expensive SULFA (SULFONAMIDE ANTIBIOTICS) AMOXICILLIN TRIHYDRATE    
POTASSIUM CLAVULANATE ACTIVE INTOLERANCES: 
Description Reaction Comment SACUBITRIL [place] Escort is too expensive PROBLEM LIST: 
Problem Description Chronic Benign essential HTN Y  
DM type 2 N A-fib Y  
CAD Y Mixed hyperlipidemia Y  
 
 
PAST MEDICAL/SURGICAL HISTORY  (Detailed) Disease/disorder Onset Date Management Date Comments  
  hysterectomy A-FIB Cardiovascular disease CHF Hyperlipidemia Hypertension SHAYLA: moderate. 05/2014 Family History  (Detailed) Relationship Family Member Name  Age at Death Condition Onset Age Cause of Death Brother    Hypertension  N Mother    PAD  N Mother    Hypertension  N Mother    Cardiac arrhythmias  N Sister    Diabetes mellitus  N  
 
SOCIAL HISTORY  (Detailed) Preferred language is [employer]. EDUCATION/EMPLOYMENT/OCCUPATION Employment History Status Retired Restrictions  
  retired    
  retired MARITAL STATUS/FAMILY/SOCIAL SUPPORT Currently . For other plans, see orders. Hospital problem list  
Active Hospital Problems Diagnosis Date Noted  AYLIN (acute kidney injury) ([address] 10/13/2018 Suspect prerenal 
  
 Atrial fibrillation with rapid ventricular response ([address] 10/07/2018  Hypokalemia 10/07/2018  Acute respiratory failure with hypoxemia ([address] 10/06/2018  Type 2 diabetes mellitus with diabetic neuropathy, without long-term current use of insulin ([address] 2016  Acute systolic heart failure ([address] 2014 Subjective: Yvonne Ellis reports Chest pain X none  consistent with:  Non-cardiac CP Atypical CP None now  On going  Anginal CP Dyspnea  none  at rest  with exertion    
   x improved  unchanged  worse PND X none  overnight Orthopnea X none  improved  unchanged  worse Presyncope X none  improved  unchanged  worse Ambulated in hallway without symptoms   Yes Ambulated in room without symptoms  Yes Objective:  
 Physical Exam: 
Overall VSSAF;   
Visit Vitals /79 (BP 1 Location: Left arm, BP Patient Position: Supine; Head of bed elevated (Comment degrees)) Comment (BP Patient Position): 20 degrees Pulse 86 Temp 98.2 °F (36.8 °C) Resp 18 Ht 5' 9\" (1.753 m) Wt 104.7 kg (230 lb 13.2 oz) SpO2 95% Breastfeeding? No  
BMI 34.09 kg/m² Temp (24hrs), Av °F (36.7 °C), Min:97.6 °F (36.4 °C), Max:98.2 °F (36.8 °C) Patient Vitals for the past 8 hrs: 
 Pulse 10/17/18 0721 86  
10/17/18 0300 74 Patient Vitals for the past 8 hrs: 
 Resp 10/17/18 0721 18  
10/17/18 0300 18 Patient Vitals for the past 8 hrs: 
 BP  
10/17/18 0721 127/79  
10/17/18 0300 120/70 Intake/Output Summary (Last 24 hours) at 10/17/2018 0831 Last data filed at 10/17/2018 9165 Gross per 24 hour Intake 1390 ml Output 1650 ml Net -260 ml General Appearance: Well developed, well nourished, no acute distress. Ears/Nose/Mouth/Throat:   Normal MM; anicteric. JVP: WNL Resp:   clear to auscultation bilaterally. Nl resp effort. Cardiovascular:  IRRR, S1, S2 normal, no new murmur. No gallop or rub. Abdomen:   Soft, non-tender, bowel sounds are present. Extremities: Min edema bilaterally. Skin: 
Neuro: Warm and dry. A/O x3, grossly nonfocal  
cath site intact w/o hematoma or new bruit; distal pulse unchanged  Yes Data Review:    
Telemetry independently reviewed  sinus x chronic afib  parox afib  NSVT  
 
ECG independently reviewed  NSR  afib  no significant changes  NSST-Tw chgs  
x no new ECG provided for review Lab results reviewed as noted below. Current medications reviewed as noted below. No results for input(s): PH, PCO2, PO2 in the last 72 hours. No results for input(s): CPK, CKMB, CKNDX, TROIQ in the last 72 hours. Recent Labs 10/17/18 
1399 10/16/18 
0259 10/15/18 
0220  144 142  
K 4.0 4.2 3.8 * 111* 110* CO2 27 29 26 BUN 33* 38* 56* CREA 1.50* 1.47* 1.72* GFRAA 42* 43* 36* GLU 95 87 119* CA 8.1* 8.1* 8.4* WBC 5.9  --  9.9 HGB 10.6*  --  11.7 HCT 33.8*  --  36.3   --  216 Lab Results Component Value Date/Time  Cholesterol, total 104 2018 10:17 AM  
 HDL Cholesterol 33 (L) 2018 10:17 AM  
 LDL, calculated 49 2018 10:17 AM  
 Triglyceride 110 08/01/2018 10:17 AM  
 CHOL/HDL Ratio 4.7 04/03/2014 03:30 PM  
 
No results for input(s): SGOT, GPT, AP, TBIL, TP, ALB, GLOB, GGT, AML, LPSE in the last 72 hours. No lab exists for component: AMYP, HLPSE No results for input(s): INR, PTP, APTT in the last 72 hours. No lab exists for component: INREXT, INREXT No components found for: [place] Current Facility-Administered Medications Medication Dose Route Frequency  lactobac ac& pc-s.therm-b.anim (JONNIE Q/RISAQUAD)  1 Cap Oral DAILY  metoprolol succinate (TOPROL-XL) XL tablet 50 mg  50 mg Oral DAILY  lisinopril (PRINIVIL, ZESTRIL) tablet 2.5 mg  2.5 mg Oral DAILY  bumetanide (BUMEX) tablet 1 mg  1 mg Oral DAILY  potassium chloride SR (KLOR-CON 10) tablet 10 mEq  10 mEq Oral DAILY  amiodarone (CORDARONE) tablet 400 mg  400 mg Oral BID  influenza vaccine 2018-19 (6 mos+)(PF) (FLUARIX QUAD/FLULAVAL QUAD) injection 0.5 mL  0.5 mL IntraMUSCular PRIOR TO DISCHARGE  enoxaparin (LOVENOX) injection 100 mg  1 mg/kg SubCUTAneous Q24H  
 conjugated estrogens (PREMARIN) 0.625 mg/gram vaginal cream 0.5 g  0.5 g Vaginal EVERY TU & FRI  insulin lispro (HUMALOG) injection   SubCUTAneous AC&HS  
 glucose chewable tablet 16 g  4 Tab Oral PRN  
 dextrose (D50W) injection syrg 12.5-25 g  12.5-25 g IntraVENous PRN  
 glucagon (GLUCAGEN) injection 1 mg  1 mg IntraMUSCular PRN  
 venlafaxine-SR (EFFEXOR-XR) capsule 150 mg  150 mg Oral BID  magnesium oxide (MAG-OX) tablet 400 mg  400 mg Oral DAILY  pregabalin (LYRICA) capsule 200 mg  200 mg Oral BID  aspirin chewable tablet 81 mg  81 mg Oral DAILY  atorvastatin (LIPITOR) tablet 40 mg  40 mg Oral QHS  sodium chloride (NS) flush 5-10 mL  5-10 mL IntraVENous Q8H  
 sodium chloride (NS) flush 5-10 mL  5-10 mL IntraVENous PRN  
 acetaminophen (TYLENOL) tablet 650 mg  650 mg Oral Q6H PRN  
 docusate sodium (COLACE) capsule 100 mg  100 mg Oral DAILY PRN  
  
 
 Natanael Obando MD

## 2018-10-17 NOTE — PROGRESS NOTES
Bedside shift change report given to me (oncoming nurse) by Angelina Hyde, RN  (offgoing nurse). Report included the following information SBAR, Kardex, ED Summary, Intake/Output, Accordion, Recent Results, Med Rec Status and Cardiac Rhythm A-Fib. TRANSFER - OUT REPORT: 
 
Verbal report given on Lauri Guerrero  being transferred to IVCU(unit) for ordered procedure Report consisted of patients Situation, Background, Assessment and  
Recommendations(SBAR). Information from the following report(s) SBAR, Kardex, ED Summary, Intake/Output, MAR, Accordion, Recent Results, Med Rec Status and Cardiac Rhythm A-Fib was reviewed with the receiving nurse. Lines:  
Peripheral IV 10/12/18 Right Antecubital (Active) Site Assessment Clean, dry, & intact 10/17/2018  3:10 PM  
Phlebitis Assessment 0 10/17/2018  3:10 PM  
Infiltration Assessment 0 10/17/2018  3:10 PM  
Dressing Status Clean, dry, & intact 10/17/2018  3:10 PM  
Dressing Type Transparent;Tape 10/17/2018  3:10 PM  
Hub Color/Line Status Pink 10/17/2018  3:10 PM  
Alcohol Cap Used Yes 10/12/2018 10:24 AM  
  
 
Opportunity for questions and clarification was provided. Patient transported with: 
 [employer]

## 2018-10-17 NOTE — PROGRESS NOTES
EP/Arrhythmia Follow-up Note: 
 
Discussed options for her regarding her persistent atrial fibrillation with poor rate control, worsening cardiomyopathy EF 15-20%. For now, the best option is a BIV system upgrade and AV node ablation. She'll be ventricular pacing all the time, so the CRT is superior to chronic RV pacing to minimize CHF. She's been on guideline-directed med therapy with beta-blocker and ACEI for CHF for some time, this is a chronic cardiomyopathy, her EF had been as low as 20% in 2014 before getting to about 40%. I did discuss making the system a BIV-ICD rather than BIV pacemaker since there will be an immediate pacing requirement and the EF may not improve to >35% chronically. A shared decision making tool was used. I discontinued Lovenox, last dose was 10/17 AM.  NPO after MN. All questions answered for her (and her  by phone). Physical Exam: 
 
Patient Vitals for the past 12 hrs: 
 Temp Pulse Resp BP SpO2  
10/17/18 1452 97.9 °F (36.6 °C) 88 18 134/85 97 % 10/17/18 1042 98.1 °F (36.7 °C) 87 18 102/76 98 % 10/17/18 0721 98.2 °F (36.8 °C) 86 18 127/79 95 % Nondiaphoretic, NAD. Sitting up in the chair. Irregular rhythm, tachycardic. Trace pedal edema. Awake, appropriate. LABS: 
 
Recent Results (from the past 24 hour(s)) GLUCOSE, POC Collection Time: 10/16/18  4:50 PM  
Result Value Ref Range Glucose (POC) 93 65 - 100 mg/dL Performed by Karlie Blackburn  (PCT) GLUCOSE, POC Collection Time: 10/16/18  8:46 PM  
Result Value Ref Range Glucose (POC) 113 (H) 65 - 100 mg/dL Performed by Dorene Rob (PCT) METABOLIC PANEL, BASIC Collection Time: 10/17/18  3:26 AM  
Result Value Ref Range Sodium 143 136 - 145 mmol/L Potassium 4.0 3.5 - 5.1 mmol/L Chloride 110 (H) 97 - 108 mmol/L  
 CO2 27 21 - 32 mmol/L Anion gap 6 5 - 15 mmol/L Glucose 95 65 - 100 mg/dL BUN 33 (H) 6 - 20 MG/DL  Creatinine 1.50 (H) 0.55 - 1.02 MG/DL  
 BUN/Creatinine ratio 22 (H) 12 - 20 GFR est AA 42 (L) >60 ml/min/1.73m2 GFR est non-AA 35 (L) >60 ml/min/1.73m2 Calcium 8.1 (L) 8.5 - 10.1 MG/DL  
CBC W/O DIFF Collection Time: 10/17/18  3:26 AM  
Result Value Ref Range WBC 5.9 3.6 - 11.0 K/uL  
 RBC 3.69 (L) 3.80 - 5.20 M/uL  
 HGB 10.6 (L) 11.5 - 16.0 g/dL HCT 33.8 (L) 35.0 - 47.0 % MCV 91.6 80.0 - 99.0 FL  
 MCH 28.7 26.0 - 34.0 PG  
 MCHC 31.4 30.0 - 36.5 g/dL  
 RDW 18.5 (H) 11.5 - 14.5 % PLATELET 671 096 - 851 K/uL MPV 12.5 8.9 - 12.9 FL  
 NRBC 0.0 0  WBC ABSOLUTE NRBC 0.00 0.00 - 0.01 K/uL GLUCOSE, POC Collection Time: 10/17/18  7:20 AM  
Result Value Ref Range Glucose (POC) 99 65 - 100 mg/dL Performed by Joyce Ashley) GLUCOSE, POC Collection Time: 10/17/18 11:58 AM  
Result Value Ref Range Glucose (POC) 84 65 - 100 mg/dL Performed by Joyce Ashley) Signed By: Krishna Rhoades MD   
 October 17, 2018

## 2018-10-17 NOTE — PROGRESS NOTES
Problem: Self Care Deficits Care Plan (Adult) Goal: *Acute Goals and Plan of Care (Insert Text) Occupational Therapy Goals: 
Initiated 10/11/2018 1. Patient will perform grooming standing with modified independence within 7 days. 2. Patient will perform dressing with modified independence within 7 days. 3. Patient will perform toileting with modified independence within 7 days. 4. Patient will bathing with modified independence within 7 days. 5. Patient will transfer from toilet with modified independence using the least restrictive device and appropriate durable medical equipment within 7 days. Occupational Therapy TREATMENT Patient: Demarcus Altman (50 y.o. female) Date: 10/17/2018 Diagnosis: Acute respiratory failure ([facility] [address] Acute respiratory failure with hypoxemia (HCC) Precautions: Fall Chart, occupational therapy assessment, plan of care, and goals were reviewed. ASSESSMENT: 
Pt was seated on BSC upon arrival.  CGA to min assist for sit to stand x4 this session. CGA for standing for BM clean up and min assist for gown management. Pt min assist to thread depends over right foot only and CGA to pull up over hips in standing. Progressed to standing at sink to brush teeth with CGA. After seated rest break pt was able to mobilize around room 15 feet x2. Once returning to bedside chair pt was noted to be more unsteady on her feet due to LE weakness. Continue to recommend inpatient rehab at discharge. Progression toward goals: 
[x]       Improving appropriately and progressing toward goals 
[]       Improving slowly and progressing toward goals 
[]       Not making progress toward goals and plan of care will be adjusted PLAN: 
Patient continues to benefit from skilled intervention to address the above impairments. Continue treatment per established plan of care. Discharge Recommendations:  Inpatient Rehab Further Equipment Recommendations for Discharge:  TBD SUBJECTIVE:  
 Patient stated I want to keep going. I need to get stronger.  OBJECTIVE DATA SUMMARY:  
Cognitive/Behavioral Status: 
Neurologic State: Alert Orientation Level: Oriented X4 Cognition: Appropriate decision making; Appropriate for age attention/concentration; Appropriate safety awareness Perception: Appears intact Perseveration: No perseveration noted Safety/Judgement: Awareness of environment; Fall prevention;Home safety Functional Mobility and Transfers for ADLs: 
Transfers: 
Sit to Stand: Contact guard assistance Functional Transfers Bathroom Mobility: Contact guard assistance(with rolling walker) Toilet Transfer : Minimum assistance Bed to Chair: Contact guard assistance Balance: 
Standing - Static: Fair Standing - Dynamic : Fair ADL Intervention: 
  
 
Grooming Grooming Assistance: (standing at sink) Washing Face: Contact guard assistance Washing Hands: Contact guard assistance Brushing Teeth: Contact guard assistance Min assist to thread depends over right LE crossed leg seated and CGA to pull over hips in standing Toileting Bladder Hygiene: Contact guard assistance(standing) Bowel Hygiene: Contact guard assistance(standing) Clothing Management: Minimum assistance Cognitive Retraining Safety/Judgement: Awareness of environment; Fall prevention;Home safety Pain: 
Pain Scale 1: Numeric (0 - 10) Pain Intensity 1: 0 Activity Tolerance:  
 
Please refer to the flowsheet for vital signs taken during this treatment. After treatment:  
[x] Patient left in no apparent distress sitting up in chair 
[] Patient left in no apparent distress in bed 
[x] Call bell left within reach [x] Nursing notified 
[] Caregiver present [x] Bed alarm activated COMMUNICATION/COLLABORATION:  
The patients plan of care was discussed with: Physical Therapist, Registered Nurse and patient PRAVEEN Titus/L Time Calculation: 23 mins

## 2018-10-17 NOTE — PROGRESS NOTES
Attempted to see pt for therapy session. Pt politely declining therapy at this time, as she had just started eating lunch. Will attempt to follow up at a later time.   
 
Hailee Lau, SPT

## 2018-10-18 ENCOUNTER — ANESTHESIA (OUTPATIENT)
Dept: NON INVASIVE DIAGNOSTICS | Age: 68
DRG: 226 | End: 2018-10-18
Payer: MEDICARE

## 2018-10-18 ENCOUNTER — APPOINTMENT (OUTPATIENT)
Dept: NON INVASIVE DIAGNOSTICS | Age: 68
DRG: 226 | End: 2018-10-18
Payer: MEDICARE

## 2018-10-18 ENCOUNTER — APPOINTMENT (OUTPATIENT)
Dept: GENERAL RADIOLOGY | Age: 68
DRG: 226 | End: 2018-10-18
Attending: INTERNAL MEDICINE
Payer: MEDICARE

## 2018-10-18 ENCOUNTER — ANESTHESIA EVENT (OUTPATIENT)
Dept: NON INVASIVE DIAGNOSTICS | Age: 68
DRG: 226 | End: 2018-10-18
Payer: MEDICARE

## 2018-10-18 LAB
ANION GAP SERPL CALC-SCNC: 7 MMOL/L (ref 5–15)
BUN SERPL-MCNC: 30 MG/DL (ref 6–20)
BUN/CREAT SERPL: 21 (ref 12–20)
CALCIUM SERPL-MCNC: 8.5 MG/DL (ref 8.5–10.1)
CHLORIDE SERPL-SCNC: 110 MMOL/L (ref 97–108)
CO2 SERPL-SCNC: 27 MMOL/L (ref 21–32)
CREAT SERPL-MCNC: 1.45 MG/DL (ref 0.55–1.02)
GLUCOSE BLD STRIP.AUTO-MCNC: 139 MG/DL (ref 65–100)
GLUCOSE BLD STRIP.AUTO-MCNC: 143 MG/DL (ref 65–100)
GLUCOSE BLD STRIP.AUTO-MCNC: 87 MG/DL (ref 65–100)
GLUCOSE BLD STRIP.AUTO-MCNC: 94 MG/DL (ref 65–100)
GLUCOSE BLD STRIP.AUTO-MCNC: 95 MG/DL (ref 65–100)
GLUCOSE SERPL-MCNC: 89 MG/DL (ref 65–100)
POTASSIUM SERPL-SCNC: 4 MMOL/L (ref 3.5–5.1)
SERVICE CMNT-IMP: ABNORMAL
SERVICE CMNT-IMP: ABNORMAL
SERVICE CMNT-IMP: NORMAL
SODIUM SERPL-SCNC: 144 MMOL/L (ref 136–145)

## 2018-10-18 PROCEDURE — 82962 GLUCOSE BLOOD TEST: CPT

## 2018-10-18 PROCEDURE — C1769 GUIDE WIRE: HCPCS

## 2018-10-18 PROCEDURE — 02H63KZ INSERTION OF DEFIBRILLATOR LEAD INTO RIGHT ATRIUM, PERCUTANEOUS APPROACH: ICD-10-PCS | Performed by: INTERNAL MEDICINE

## 2018-10-18 PROCEDURE — C1882 AICD, OTHER THAN SING/DUAL: HCPCS

## 2018-10-18 PROCEDURE — 74011250637 HC RX REV CODE- 250/637: Performed by: INTERNAL MEDICINE

## 2018-10-18 PROCEDURE — 02HL3KZ INSERTION OF DEFIBRILLATOR LEAD INTO LEFT VENTRICLE, PERCUTANEOUS APPROACH: ICD-10-PCS | Performed by: INTERNAL MEDICINE

## 2018-10-18 PROCEDURE — 77030002996 HC SUT SLK J&J -A

## 2018-10-18 PROCEDURE — 74011250636 HC RX REV CODE- 250/636

## 2018-10-18 PROCEDURE — C1894 INTRO/SHEATH, NON-LASER: HCPCS

## 2018-10-18 PROCEDURE — 0JH609Z INSERTION OF CARDIAC RESYNCHRONIZATION DEFIBRILLATOR PULSE GENERATOR INTO CHEST SUBCUTANEOUS TISSUE AND FASCIA, OPEN APPROACH: ICD-10-PCS | Performed by: INTERNAL MEDICINE

## 2018-10-18 PROCEDURE — 93641 EP EVL 1/2CHMB PAC CVDFB TST: CPT

## 2018-10-18 PROCEDURE — 71045 X-RAY EXAM CHEST 1 VIEW: CPT

## 2018-10-18 PROCEDURE — 65660000000 HC RM CCU STEPDOWN

## 2018-10-18 PROCEDURE — 77030039266 HC ADH SKN EXOFIN S2SG -A

## 2018-10-18 PROCEDURE — 02HK3KZ INSERTION OF DEFIBRILLATOR LEAD INTO RIGHT VENTRICLE, PERCUTANEOUS APPROACH: ICD-10-PCS | Performed by: INTERNAL MEDICINE

## 2018-10-18 PROCEDURE — 76060000034 HC ANESTHESIA 1.5 TO 2 HR

## 2018-10-18 PROCEDURE — C1751 CATH, INF, PER/CENT/MIDLINE: HCPCS

## 2018-10-18 PROCEDURE — 80048 BASIC METABOLIC PNL TOTAL CA: CPT | Performed by: INTERNAL MEDICINE

## 2018-10-18 PROCEDURE — 74011000250 HC RX REV CODE- 250

## 2018-10-18 PROCEDURE — 77030028698 HC BLD TISS PLSM MEDT -D

## 2018-10-18 PROCEDURE — C1893 INTRO/SHEATH, FIXED,NON-PEEL: HCPCS

## 2018-10-18 PROCEDURE — A4565 SLINGS: HCPCS

## 2018-10-18 PROCEDURE — 74011250636 HC RX REV CODE- 250/636: Performed by: INTERNAL MEDICINE

## 2018-10-18 PROCEDURE — 36415 COLL VENOUS BLD VENIPUNCTURE: CPT | Performed by: INTERNAL MEDICINE

## 2018-10-18 PROCEDURE — 0JPT0PZ REMOVAL OF CARDIAC RHYTHM RELATED DEVICE FROM TRUNK SUBCUTANEOUS TISSUE AND FASCIA, OPEN APPROACH: ICD-10-PCS | Performed by: INTERNAL MEDICINE

## 2018-10-18 PROCEDURE — C1777 LEAD, AICD, ENDO SINGLE COIL: HCPCS

## 2018-10-18 PROCEDURE — 74011636320 HC RX REV CODE- 636/320: Performed by: INTERNAL MEDICINE

## 2018-10-18 PROCEDURE — 77030018836 HC SOL IRR NACL ICUM -A

## 2018-10-18 PROCEDURE — 77030038269 HC DRN EXT URIN PURWCK BARD -A

## 2018-10-18 PROCEDURE — 77030031139 HC SUT VCRL2 J&J -A

## 2018-10-18 PROCEDURE — C1900 LEAD, CORONARY VENOUS: HCPCS

## 2018-10-18 PROCEDURE — 77030018729 HC ELECTRD DEFIB PAD CARD -B

## 2018-10-18 RX ORDER — LIDOCAINE HYDROCHLORIDE AND EPINEPHRINE 10; 10 MG/ML; UG/ML
INJECTION, SOLUTION INFILTRATION; PERINEURAL
Status: COMPLETED
Start: 2018-10-18 | End: 2018-10-18

## 2018-10-18 RX ORDER — FENTANYL CITRATE 50 UG/ML
INJECTION, SOLUTION INTRAMUSCULAR; INTRAVENOUS
Status: DISCONTINUED
Start: 2018-10-18 | End: 2018-10-18

## 2018-10-18 RX ORDER — LIDOCAINE HYDROCHLORIDE AND EPINEPHRINE 10; 10 MG/ML; UG/ML
1-20 INJECTION, SOLUTION INFILTRATION; PERINEURAL
Status: DISCONTINUED | OUTPATIENT
Start: 2018-10-18 | End: 2018-10-18 | Stop reason: HOSPADM

## 2018-10-18 RX ORDER — PROPOFOL 10 MG/ML
INJECTION, EMULSION INTRAVENOUS
Status: DISCONTINUED | OUTPATIENT
Start: 2018-10-18 | End: 2018-10-18 | Stop reason: HOSPADM

## 2018-10-18 RX ORDER — MIDAZOLAM HYDROCHLORIDE 1 MG/ML
1-5 INJECTION, SOLUTION INTRAMUSCULAR; INTRAVENOUS
Status: DISCONTINUED | OUTPATIENT
Start: 2018-10-18 | End: 2018-10-18 | Stop reason: HOSPADM

## 2018-10-18 RX ORDER — FENTANYL CITRATE 50 UG/ML
12.5-5 INJECTION, SOLUTION INTRAMUSCULAR; INTRAVENOUS
Status: DISCONTINUED | OUTPATIENT
Start: 2018-10-18 | End: 2018-10-18 | Stop reason: HOSPADM

## 2018-10-18 RX ORDER — SODIUM CHLORIDE 9 MG/ML
INJECTION, SOLUTION INTRAVENOUS
Status: DISCONTINUED | OUTPATIENT
Start: 2018-10-18 | End: 2018-10-18 | Stop reason: HOSPADM

## 2018-10-18 RX ORDER — MIDAZOLAM HYDROCHLORIDE 1 MG/ML
INJECTION, SOLUTION INTRAMUSCULAR; INTRAVENOUS
Status: DISCONTINUED
Start: 2018-10-18 | End: 2018-10-18

## 2018-10-18 RX ORDER — PHENYLEPHRINE HCL IN 0.9% NACL 0.4MG/10ML
SYRINGE (ML) INTRAVENOUS AS NEEDED
Status: DISCONTINUED | OUTPATIENT
Start: 2018-10-18 | End: 2018-10-18 | Stop reason: HOSPADM

## 2018-10-18 RX ORDER — VANCOMYCIN HYDROCHLORIDE 1 G/20ML
INJECTION, POWDER, LYOPHILIZED, FOR SOLUTION INTRAVENOUS
Status: DISCONTINUED
Start: 2018-10-18 | End: 2018-10-22 | Stop reason: HOSPADM

## 2018-10-18 RX ORDER — MIDAZOLAM HYDROCHLORIDE 1 MG/ML
INJECTION, SOLUTION INTRAMUSCULAR; INTRAVENOUS AS NEEDED
Status: DISCONTINUED | OUTPATIENT
Start: 2018-10-18 | End: 2018-10-18 | Stop reason: HOSPADM

## 2018-10-18 RX ORDER — SODIUM CHLORIDE 0.9 % (FLUSH) 0.9 %
5-10 SYRINGE (ML) INJECTION AS NEEDED
Status: DISCONTINUED | OUTPATIENT
Start: 2018-10-18 | End: 2018-10-22 | Stop reason: HOSPADM

## 2018-10-18 RX ORDER — SODIUM CHLORIDE 900 MG/100ML
INJECTION INTRAVENOUS
Status: DISCONTINUED
Start: 2018-10-18 | End: 2018-10-18

## 2018-10-18 RX ORDER — BACITRACIN 50000 [IU]/1
50000 INJECTION, POWDER, FOR SOLUTION INTRAMUSCULAR ONCE
Status: COMPLETED | OUTPATIENT
Start: 2018-10-18 | End: 2018-10-18

## 2018-10-18 RX ORDER — AMIODARONE HYDROCHLORIDE 200 MG/1
200 TABLET ORAL 2 TIMES DAILY
Status: DISCONTINUED | OUTPATIENT
Start: 2018-10-18 | End: 2018-10-19

## 2018-10-18 RX ORDER — SODIUM CHLORIDE 0.9 % (FLUSH) 0.9 %
5-10 SYRINGE (ML) INJECTION EVERY 8 HOURS
Status: DISCONTINUED | OUTPATIENT
Start: 2018-10-18 | End: 2018-10-21 | Stop reason: SDUPTHER

## 2018-10-18 RX ORDER — HEPARIN SODIUM 200 [USP'U]/100ML
INJECTION, SOLUTION INTRAVENOUS
Status: DISCONTINUED
Start: 2018-10-18 | End: 2018-10-18

## 2018-10-18 RX ORDER — FENTANYL CITRATE 50 UG/ML
INJECTION, SOLUTION INTRAMUSCULAR; INTRAVENOUS AS NEEDED
Status: DISCONTINUED | OUTPATIENT
Start: 2018-10-18 | End: 2018-10-18 | Stop reason: HOSPADM

## 2018-10-18 RX ORDER — BACITRACIN 50000 [IU]/1
INJECTION, POWDER, FOR SOLUTION INTRAMUSCULAR
Status: COMPLETED
Start: 2018-10-18 | End: 2018-10-18

## 2018-10-18 RX ORDER — HEPARIN SODIUM 200 [USP'U]/100ML
500 INJECTION, SOLUTION INTRAVENOUS ONCE
Status: COMPLETED | OUTPATIENT
Start: 2018-10-18 | End: 2018-10-19

## 2018-10-18 RX ADMIN — Medication 80 MCG: at 13:10

## 2018-10-18 RX ADMIN — PROPOFOL 25 MCG/KG/MIN: 10 INJECTION, EMULSION INTRAVENOUS at 12:47

## 2018-10-18 RX ADMIN — AMIODARONE HYDROCHLORIDE 400 MG: 200 TABLET ORAL at 08:59

## 2018-10-18 RX ADMIN — HEPARIN SODIUM 1000 UNITS: 200 INJECTION, SOLUTION INTRAVENOUS at 13:14

## 2018-10-18 RX ADMIN — VANCOMYCIN HYDROCHLORIDE 1000 MG: 1 INJECTION, POWDER, LYOPHILIZED, FOR SOLUTION INTRAVENOUS at 13:09

## 2018-10-18 RX ADMIN — POTASSIUM CHLORIDE 10 MEQ: 750 TABLET, FILM COATED, EXTENDED RELEASE ORAL at 08:58

## 2018-10-18 RX ADMIN — VENLAFAXINE HYDROCHLORIDE 150 MG: 150 CAPSULE, EXTENDED RELEASE ORAL at 08:58

## 2018-10-18 RX ADMIN — FENTANYL CITRATE 50 MCG: 50 INJECTION, SOLUTION INTRAMUSCULAR; INTRAVENOUS at 12:45

## 2018-10-18 RX ADMIN — LIDOCAINE HYDROCHLORIDE,EPINEPHRINE BITARTRATE 30 MG: 10; .01 INJECTION, SOLUTION INFILTRATION; PERINEURAL at 13:21

## 2018-10-18 RX ADMIN — LIDOCAINE HYDROCHLORIDE AND EPINEPHRINE 30 MG: 10; 10 INJECTION, SOLUTION INFILTRATION; PERINEURAL at 13:21

## 2018-10-18 RX ADMIN — MIDAZOLAM HYDROCHLORIDE 2 MG: 1 INJECTION, SOLUTION INTRAMUSCULAR; INTRAVENOUS at 12:43

## 2018-10-18 RX ADMIN — FENTANYL CITRATE 25 MCG: 50 INJECTION, SOLUTION INTRAMUSCULAR; INTRAVENOUS at 13:05

## 2018-10-18 RX ADMIN — BACITRACIN 50000 UNITS: 5000 INJECTION, POWDER, FOR SOLUTION INTRAMUSCULAR at 14:00

## 2018-10-18 RX ADMIN — PREGABALIN 200 MG: 100 CAPSULE ORAL at 08:58

## 2018-10-18 RX ADMIN — Medication 10 ML: at 21:46

## 2018-10-18 RX ADMIN — LISINOPRIL 2.5 MG: 5 TABLET ORAL at 17:56

## 2018-10-18 RX ADMIN — PREGABALIN 200 MG: 100 CAPSULE ORAL at 17:51

## 2018-10-18 RX ADMIN — Medication 10 ML: at 21:45

## 2018-10-18 RX ADMIN — FENTANYL CITRATE 25 MCG: 50 INJECTION, SOLUTION INTRAMUSCULAR; INTRAVENOUS at 12:56

## 2018-10-18 RX ADMIN — Medication 80 MCG: at 13:24

## 2018-10-18 RX ADMIN — METOPROLOL SUCCINATE 50 MG: 50 TABLET, EXTENDED RELEASE ORAL at 08:58

## 2018-10-18 RX ADMIN — SODIUM CHLORIDE: 9 INJECTION, SOLUTION INTRAVENOUS at 12:38

## 2018-10-18 RX ADMIN — IOPAMIDOL 25 ML: 755 INJECTION, SOLUTION INTRAVENOUS at 13:15

## 2018-10-18 RX ADMIN — VENLAFAXINE HYDROCHLORIDE 150 MG: 150 CAPSULE, EXTENDED RELEASE ORAL at 17:51

## 2018-10-18 RX ADMIN — ASPIRIN 81 MG CHEWABLE TABLET 81 MG: 81 TABLET CHEWABLE at 08:59

## 2018-10-18 RX ADMIN — ACETAMINOPHEN 650 MG: 325 TABLET ORAL at 17:51

## 2018-10-18 RX ADMIN — BUMETANIDE 1 MG: 1 TABLET ORAL at 17:56

## 2018-10-18 RX ADMIN — BACITRACIN 50000 UNITS: 50000 INJECTION, POWDER, FOR SOLUTION INTRAMUSCULAR at 14:00

## 2018-10-18 RX ADMIN — Medication 80 MCG: at 14:02

## 2018-10-18 RX ADMIN — ATORVASTATIN CALCIUM 40 MG: 40 TABLET, FILM COATED ORAL at 21:45

## 2018-10-18 RX ADMIN — Medication 400 MG: at 08:58

## 2018-10-18 RX ADMIN — Medication 1 CAPSULE: at 08:58

## 2018-10-18 RX ADMIN — AMIODARONE HYDROCHLORIDE 200 MG: 200 TABLET ORAL at 17:51

## 2018-10-18 NOTE — PROGRESS NOTES
Occupational Therapy Will defer OT as pt is going off the floor for pacemaker. Will continue to follow when appropriate.

## 2018-10-18 NOTE — ANESTHESIA PREPROCEDURE EVALUATION
Anesthetic History No history of anesthetic complications Review of Systems / Medical History Patient summary reviewed, nursing notes reviewed and pertinent labs reviewed Pulmonary Sleep apnea: CPAP Asthma : well controlled Neuro/Psych Headaches and psychiatric history Cardiovascular Hypertension: well controlled Dysrhythmias : atrial fibrillation Pacemaker, past MI (2014), cardiac stents and hyperlipidemia Exercise tolerance: <4 METS Comments: Left ventricle: Ejection fraction was estimated in the range of 15 % to 20 
%. GI/Hepatic/Renal 
  
GERD Endo/Other Diabetes: type 2, using insulin Morbid obesity and arthritis Pertinent negatives: No obesity Other Findings Comments: Neuropathy Physical Exam 
 
Airway Mallampati: III 
TM Distance: 4 - 6 cm Neck ROM: normal range of motion Mouth opening: Normal 
 
 Cardiovascular Regular rate and rhythm,  S1 and S2 normal,  no murmur, click, rub, or gallop Dental 
No notable dental hx Pulmonary Breath sounds clear to auscultation Abdominal 
GI exam deferred Other Findings Anesthetic Plan ASA: 3 Anesthesia type: total IV anesthesia and MAC Induction: Intravenous Anesthetic plan and risks discussed with: Patient

## 2018-10-18 NOTE — PROCEDURES
[address]  [phone]    Patient ID:  Patient: Sasha Dunn  MRN: [id number]  Age: 79 y.o.  : 1950  Gender: female  Study Date: 10/18/2018    History: This is a female with a chronic nonischemic cardiomyopathy EF 27-92%, chronic systolic congestive heart failure class 3, >3 months on an optimized guideline-directed medical regimen, and a dual chamber pacemaker due to nonreversible sick sinus syndrome.  An AV node ablation is planned, she is a candidate for CRT due to anticipated pacemaker dependency and is here for a BIV-ICD upgrade to her current dual chamber pacemaker since she has heart failure and a severe cardiomyopathy. A shared decision making tool was used.      Procedured Performed:  1.  Multi-lead ICD implant  (97915)  2.  Left ventricular lead insert (08478)    3.  Removal of pacemaker generator (98061-55)      The patient was brought to the EP lab in a postabsorptive state after informed consent had been previously obtained. Continuous electrocardiographic and hemodynamic monitoring was performed.  Sedation was performed by the anesthesiologist who was in constant attendance throughout the procedure.  Using 1% lidocaine with epinephrine, the left chest site was anesthetized at the site of the preexisting pocket.  The pocket was opened in the usual fashion and the pacemaker was exposed and removed from the pocket still attached to the leads for pacing support.  Ultimately, two safe sheaths were placed after contrast venography of the left upper extremity confirmed patency of the left subclavian vein.      The single coil right ventricular shock lead (TFF715V/58 cm, [id number]), was advanced to the RV apex.  The lead was fixed and tested, performance verified.  The lead was anchored to the pocket floor using two 0-silk sutures at the anchor sleeve.     The LV lead delivery system was advanced into the coronary sinus over a hydrophilic J wire.  Several target vessels were identified by CS venography and the high lateral branch was taken. The quadripolar LV lead (1458QL/86 cm, [id number]) was advanced into the target vessel along a medium-weight coronary wire. The lead was tested and performance verified. The lead was anchored to the pocket floor using two 0-silk sutures at the anchor sleeve.      The old pacemaker generator was finally removed from the leads and the new BIV-ICD generator was connected to the appropriate leads including the new RV and LV leads, and old right atrial (2088TC/52 cm, [id number], implanted 7/17/2014). [de-identified] old RV pacing lead was capped, a single 0-silk tie was used to secure the cap. The leads were coiled beneath the generator, and placed in the new pocket.  Vigorous irrigation with antibiotic solution was performed.      The pocket was closed using a running 2-0 Vicryl layer x1, followed by a more superficial layer of running 4-0 Vicryl in a subcuticular fashion to close the skin.  Dermabond was applied.  Final fluoroscopic check revealed adequate redundancy of the leads and the absence of pneumothorax.      Defibrillation testing was not performed due to ongoing atrial fibrillation in the absence of anticoagulation.      Preoperative Diagnosis: As above. Postoperative Diagnosis: As above. Procedure:  As above. Surgeon(s) and Role: Shaina Peterson MD - Primary   Anesthesia: [facility] CAM by the anesthesiologist.  Estimated Blood Loss:  10 cc. Specimens: * No specimens in log *   Findings:  As below.   Complications:  None.          SETTINGS for new BIV-ICD:  SJM Quadra Assura MP, [id number] CRT-D, [id number]  RA Afib 0.3, -, 390  RV 20.5, 1.2, 760  LV 20.4, 0.7, 640 (3-4)  DDDR       Explanted ICD device:  DEBBI Cesar DR 4163, [id number], implanted 7/17/2014  Capped RV lead:  2088TC/58 cm, [id number], implanted 7/17/2014        Recommendations:  After successful BIV-ICD upgrade to the left chest using transvenous leads, and removal of old pacemaker generator, routine postoperative management is recommended.      Signed:  Domenica Garcia MD

## 2018-10-18 NOTE — ANESTHESIA POSTPROCEDURE EVALUATION
* No procedures listed *. Anesthesia Post Evaluation Patient location during evaluation: PACU Note status: Adequate. Level of consciousness: responsive to verbal stimuli and sleepy but conscious Pain management: satisfactory to patient Airway patency: patent Anesthetic complications: no 
Cardiovascular status: acceptable Respiratory status: acceptable Hydration status: acceptable Comments: +Post-Anesthesia Evaluation and Assessment Patient: Tabitha Kebede MRN: [id number]  SSN: [id number] YOB: 1950  Age: 79 y.o. Sex: female Cardiovascular Function/Vital Signs /68   Pulse 85   Temp 36.4 °C (97.5 °F)   Resp 19   Ht 5' 9\" (1.753 m)   Wt 104.9 kg (231 lb 4.2 oz)   SpO2 97%   Breastfeeding? No   BMI 34.15 kg/m² Patient is status post * No procedures listed *. Nausea/Vomiting: Controlled. Postoperative hydration reviewed and adequate. Pain: 
Pain Scale 1: Numeric (0 - 10) (10/18/18 1423) Pain Intensity 1: 0 (10/18/18 1423) Managed. Neurological Status: At baseline. Mental Status and Level of Consciousness: Arousable. Pulmonary Status:  
O2 Device: Room air (10/18/18 1423) Adequate oxygenation and airway patent. Complications related to anesthesia: None Post-anesthesia assessment completed. No concerns. Signed By: Julien Ceja MD  
 10/18/2018 Visit Vitals /68 Pulse 85 Temp 36.4 °C (97.5 °F) Resp 19 Ht 5' 9\" (1.753 m) Wt 104.9 kg (231 lb 4.2 oz) SpO2 97% Breastfeeding? No  
BMI 34.15 kg/m²

## 2018-10-18 NOTE — PROGRESS NOTES
TRANSFER - IN REPORT: 
 
Verbal report received from CLARI RN on Remberto Gao  being received from EP for routine progression of care. Report consisted of patients Situation, Background, Assessment and Recommendations(SBAR). Information from the following report(s) SBAR was reviewed with the receiving clinician. Opportunity for questions and clarification was provided. Assessment completed upon patients arrival to [address] [place] [address] and care assumed. Cardiac Cath Lab Recovery Arrival Note: 
 
Remberto Gao arrived to [facility] recovery area. Patient procedure= BiV. Patient on cardiac monitor, non-invasive blood pressure, SPO2 monitor. On RA. IV  of NS on pump at 0 ml/hr. Patient status doing well without problems. Patient is A&Ox 4. Patient reports no complaints. PROCEDURE SITE CHECK: 
 
Procedure site:without any bleeding and tegaderm, no pain/discomfort reported at procedure site. No change in patient status. Continue to monitor patient and status.

## 2018-10-18 NOTE — PROGRESS NOTES
[address] 
[phone] Medical Progress Note NAME: Lauri Guerrero :  1950 MRM:  [id number] Date/Time: 10/18/2018  6:02 AM 
  
Subjective:  
 
Admitted with acute respiratory failure with hypoxemia due to systolic CHF associated with atrial fib with RVR. Bedside EF 15-20%. Chest xray showed moderate CHF. Respiratory failure rapidly progressed with subsequent intubation. Patient on pressors for short period of time. Afib controlled with amiodarone with exacerbations due to hypokalemia related to diuresis. Successfully extubated  complicated by stridor. Patient currently awake,oriented x3. Currently in afib 70-90's . O2 sat's adequate  K+ 4.0. Renal function improved with creatinine of 1.45. No new complaints. Past Medical History:  
Diagnosis Date  Anxiety 2018  Arthritis OA  Asthma  Diabetes ([facility]  Essential hypertension  GERD (gastroesophageal reflux disease)  Hypercholesterolemia 2018  Hypertension  Long-term use of high-risk medication 2018  Neuropathy  Other ill-defined conditions(799.89) IBS, spinal stenosis  Plantar fasciitis  Psychiatric disorder   
 depression, anxiety  Sleep apnea   
 uses CPAP  Type 2 diabetes mellitus with diabetic neuropathy, without long-term current use of insulin ([address] 2016 ROS:  General: postive for weakness Respiratory: negative for cough, congestion Cardiology:  negative for chest pain, palpitations, 
Gastrointestinal: negative for abdominal pain, N/V Muskuloskeletal : negative for arthralgia, myalgia Neurological: negative for headache, dizziness or focal deficits Psychological: positive for less anxiety Review of systems otherwise negative Objective:  
 
 
Vitals:  
 
  
Last 24hrs VS reviewed since prior progress note. Most recent are: 
 
Visit Vitals /77 Pulse 84 Temp 97.4 °F (36.3 °C) Resp 17 Ht 5' 9\" (1.753 m) Wt 231 lb 4.2 oz (104.9 kg) SpO2 96% Breastfeeding? No  
BMI 34.15 kg/m² SpO2 Readings from Last 6 Encounters:  
10/18/18 96% 09/10/18 96% 08/07/18 94% 08/01/18 96% 03/14/18 96% 02/22/18 97% O2 Flow Rate (L/min): 2 l/min Intake/Output Summary (Last 24 hours) at 10/18/2018 0602 Last data filed at 10/18/2018 0501 Gross per 24 hour Intake 490 ml Output 1000 ml Net -510 ml Telemetry reviewed:   afib Tubes:   Vargas, central line X-Ray:  Admission chest xray - moderated CHF Procedures:   Echo - Left ventricle: Ejection fraction was estimated in the range of 15 % to 20 
%. There was diffuse hypokinesis. Mitral valve: There was mild regurgitation. Exam:  
 
General   well developed, well nourished, appears stated age in no respiratory distress Neck   Supple without nodes or mass. No thyromegaly or bruit Respiratory   Lungs much clearer Cardiology  RRR without murmur Abdominal  Soft, non-tender, non-distended, positive bowel sounds, no hepatosplenomegaly Extremities  Without LE edema Skin  Normal skin turgor. No rashes or skin ulcers noted Neurological No focal deficit noted Psychological  Alert, oriented x3 Exam otherwise negative Lab Data Reviewed: (see below) Medications Reviewed: (see below) 
 
______________________________________________________________________ Medications:  
 
Current Facility-Administered Medications Medication Dose Route Frequency  lactobac ac& pc-s.therm-b.anim (JONNIE Q/RISAQUAD)  1 Cap Oral DAILY  metoprolol succinate (TOPROL-XL) XL tablet 50 mg  50 mg Oral DAILY  lisinopril (PRINIVIL, ZESTRIL) tablet 2.5 mg  2.5 mg Oral DAILY  bumetanide (BUMEX) tablet 1 mg  1 mg Oral DAILY  potassium chloride SR (KLOR-CON 10) tablet 10 mEq  10 mEq Oral DAILY  amiodarone (CORDARONE) tablet 400 mg  400 mg Oral BID  
  conjugated estrogens (PREMARIN) 0.625 mg/gram vaginal cream 0.5 g  0.5 g Vaginal EVERY TU & FRI  insulin lispro (HUMALOG) injection   SubCUTAneous AC&HS  
 glucose chewable tablet 16 g  4 Tab Oral PRN  
 dextrose (D50W) injection syrg 12.5-25 g  12.5-25 g IntraVENous PRN  
 glucagon (GLUCAGEN) injection 1 mg  1 mg IntraMUSCular PRN  
 venlafaxine-SR (EFFEXOR-XR) capsule 150 mg  150 mg Oral BID  magnesium oxide (MAG-OX) tablet 400 mg  400 mg Oral DAILY  pregabalin (LYRICA) capsule 200 mg  200 mg Oral BID  aspirin chewable tablet 81 mg  81 mg Oral DAILY  atorvastatin (LIPITOR) tablet 40 mg  40 mg Oral QHS  sodium chloride (NS) flush 5-10 mL  5-10 mL IntraVENous Q8H  
 sodium chloride (NS) flush 5-10 mL  5-10 mL IntraVENous PRN  
 acetaminophen (TYLENOL) tablet 650 mg  650 mg Oral Q6H PRN  
 docusate sodium (COLACE) capsule 100 mg  100 mg Oral DAILY PRN Lab Review:  
 
Recent Labs 10/17/18 
0326 WBC 5.9 HGB 10.6* HCT 33.8*  
 Recent Labs 10/18/18 
0708 10/17/18 
0326 10/16/18 
5637  143 144  
K 4.0 4.0 4.2 * 110* 111* CO2 27 27 29 GLU 89 95 87 BUN 30* 33* 38* CREA 1.45* 1.50* 1.47* CA 8.5 8.1* 8.1* Lab Results Component Value Date/Time Glucose (POC) 103 (H) 10/17/2018 09:37 PM  
 Glucose (POC) 92 10/17/2018 04:59 PM  
 Glucose (POC) 84 10/17/2018 11:58 AM  
 Glucose (POC) 99 10/17/2018 07:20 AM  
 Glucose (POC) 113 (H) 10/16/2018 08:46 PM  
 
No results for input(s): PH, PCO2, PO2, HCO3, FIO2 in the last 72 hours. No results for input(s): INR in the last 72 hours. No lab exists for component: INREXT, INREXT Assessment:  
 
Principal Problem: 
  Acute respiratory failure with hypoxemia ([facility] [address] (10/6/2018) Active Problems: 
  Type 2 diabetes mellitus with diabetic neuropathy, without long-term current use of insulin ([facility] (6/5/2016) Acute systolic heart failure ([facility] (4/4/2014) Atrial fibrillation with rapid ventricular response ([facility] [facility] (10/7/2018) Hypokalemia (10/7/2018) AYLIN (acute kidney injury) ([facility] [address] (10/13/2018) Overview: Suspect prerenal 
 
 
 
  
Plan:  
 
Risk of deterioration: high 1. Continue O2 as needed 2. Continue oral bumex, amiodarone 3. Continue oral KCl, Mg++ replacement. 4. Follow labs,sat's  
5. BIV-ICD upgrade, AV node ablation today 6. Awaiting decision regarding inpatient rehab at [facility] when discharged Care Plan discussed with: Nursing Staff, patient Discussed:  Care Plan Prophylaxis:  Lovenox and SCD's Disposition:  Unknown 
        
___________________________________________________ Attending Physician: Mikala Hernandez MD

## 2018-10-18 NOTE — PROGRESS NOTES
Cardiology Progress Note 10/18/2018     Admit Date: 10/6/2018 Admit Diagnosis: Acute respiratory failure (HCC)  CC: none currently Cardiac Assessment/Plan:  
Ms Fredis Saeed has a h/o: 
1) CAD (LAD ISELA 2014 for NQMI), Neg PET for ischemia 9/2018. 
2) mixed ischemic/tachycardia mediated CM 4/2014 (EF 20%); EF 45% 11/2017. [employer] was too expensive. 3) HTN, 4) PAfib (RFA 4/2016 and 1/2017), Recurrrent/persistent afib 2017/2018 5) DM,  
6) SHAYLA (on CPAP),  
7) CKD (Cr 1.5 range). Aldactone stopped in 2014. 8) [facility] PPM 7/2014 for bradycardia while on therapy for AFib. CAD: LAD ISELA 2014; recent PET w/o ischemia but recurrent low EF: if not improved with med Rx, may need repeat cath. CM: Echo 10/9/18: EF 15-20%. Has diuresed well; on PO Bumex 1 qday. Tolerating BBlocker @ lower dose than previous; adding back low-dose ACEi (lisinopril 2.5 qday) HTN: off metoprolol/lisinopril with low BPs after ETT/sedation: Levophed off since evening of 10/7; change to prn Jose: restarted BBlocker (toprol XL 50 qday now). Rhythm: PAFib; recurrent; remains on PO amio; Dr Brandee Mccallum rec: stablized CHF then considering AV node ablation/BiV PM or ICD. Cont anticoagulation (lovenox for now; PO after ablation plan/BiV upgrade decided). Renal function: Cr down to 1.7 from 2; (Cr 1.3 range typically). Resp failure, SHAYLA, DM, Dispo: per primary team.  
For other plans, see orders. 10/14: On Room air, cr still at 2, bumex PO restarted, amiodarone decreaesed from TID to bid, HR improved. 10/15: No orthopnea; much less STEEN; fell in bathroom yesterday (legs weak/poor balance): will need some rehab. No CP; PO bumex 1 qday; changed metoprolol to XL 50 qday; added lisinopril 2.5; cont amio PO bid. 10/16: Stable cardiac status; Good UOP; Cr down to 1.47; K normal; tolerating above meds. Would decrease amio to 200 bid on d/c.  Dr Brandee Mccallum to address EP plan (change lovenox to PO when appropriate). C/O GI issues (?gastro-colic reflex); ?probiotics. 10/17: Stable cardiac status; Cr stable at 1.5; Tolerating meds; EP plan per Dr Juana Garcia. 10/18: Stable cardiac status; AV node ablation/BiV device pending; will increase acei/decrease bblocker after procedure. Cr stable; appropriate vol status; cont amio for now (PAfib previously): ?d/c in future if chronic afib. Back on anticoag after procedure. 
 
______________________________________________________________________________________________ 
@ OV 10/2/18: 
Ms Miri Hernandez has a h/o: 
1) CAD (LAD ISELA 2014 for NQMI), Neg PET for ischemia 9/2018. 
2) mixed ischemic/tachycardia mediated CM 4/2014 (EF 20%); EF 45% 11/2017. [employer] [place] was too expensive. 3) HTN, 4) PAfib (RFA 4/2016 and 1/2017), Recurrrent/persistent afib 2017/2018 5) DM,  
6) SHAYLA (on CPAP),  
7) CKD (Cr 1.5 range). Aldactone stopped in 2014. 8) St Don PPM 7/2014 for bradycardia while on therapy for AFib. 
 
11/2017: No CP/STEEN; Back in afib today:  Coreg changed Toprol-XL. 10/2018:  Recent ER visit with epigastric pain; negative evaluation there & seen by Dr. Ambika Nance who set up a cardiac PET. The PET shows no ischemia but EF suggested at 16%. Of note patient does have AFib with accelerated ventricular response. No recurrence of epigastric discomfort. No bleeding. Had a simple trip at home a few weeks ago without injury. IMPRESSION AND PLAN 
  
01. (I25.10) Atherosclerotic heart disease of native coronary artery without angina pectoris:  No anginal symptoms. Cont asa and stopped plavix with need for anticoagulation. We discussed the signs and symptoms of unstable angina, myocardial infarction and malignant arrhythmia. The patient knows to seek immediate medical attention should they occur. ECG done today 02. (I42.0) Dilated cardiomyopathy:  Mixed ischemic/non-ischemic (tachycardia mediated) CM. Her ejection fraction is greater than 35% after repeat echo. The patient's systolic function has been stable. I suspect the PET has a falsely low EF related to her AFib but repeat echo is needed. 2-D w/CFD Echo to be done first available. 03. (I50.42) Chronic combined systolic (congestive) and diastolic (congestive) heart failure:  Resolved exertional symptoms. (NYHA I)  The patient is compliant with their CHF regimen. 04. (I48.1) Persistent atrial fibrillation:  Management per Dr Alberto Rodriguez; currently off amio and on Pradaxa. Heart rate has been to elevated; increase Toprol  q.day. Further AFib monitoring/therapy per Dr. Favian Reyna. 05. (I13.0) Hyp hrt & chr kdny dis w hrt fail and stg 1-4/unsp chr kdny:  Adequately controlled. We will see how the patient does with the med changes noted above. 06. (E78.2) Mixed hyperlipidemia:  Lipid labs drawn by PCP. The patient is tolerating lipid lowering therapy well. 07. (N18.3) Chronic kidney disease, stage 3 (moderate):  Cr 1.24/GFR46 11/2015. Cr 1.32/GFR 43 1/2017.  
08. (R60.0) Localized edema:  Resolved. She avoids salt. 09. (E11.69) Type 2 diabetes mellitus with other specified complication:  Lipids & HTN as noted above; DM managed by other MD.  
10. (Z95.0) Presence of cardiac pacemaker:  will continue to be followed in device clinic. 11. (Z79.82) Long term (current) use of aspirin:  This condition is stable. 12. (Z79.01) Long term (current) use of anticoagulants: This condition is stable. Indicated for atrial fibrillation. No bleeding. If she has recurrent falls, she knows to call for re-evaluation of anticoagulation. 13. (Z68.38) Body mass index (BMI) 38.0-38.9, adult:  The patient was instructed on AHA diet and regular exercise. ORDERS: 
    
1 ECG done today 2 2-D w/ CFD Echocardiogram first available. 3 Return office visit with Delgado Velásquez. Anabel BERRY in 6 Months. 4 The patient was instructed on AHA diet and regular exercise. 10/2/18 MEDICATION LIST Medication Sig Description  
amlodipine 5 mg tablet take 1 tablet by oral route  every day per pc  
aspirin 81 mg tablet,delayed release take 1 tablet by oral route  every day  
atorvastatin 40 mg tablet take 1 tablet by oral route  every evening  
bumetanide 1 mg tablet 1 in am on tues&thursday 2 tablets on mon wed fridays Calcium 600 600 mg (1,500 mg) tablet daily  
clonazepam 0.5 mg tablet take 1 tablet by oral route  every day LISINOPRIL 20 MG Tablet TAKE 1 TABLET EVERY DAY Lyrica 200 mg capsule take 1 capsule by oral route 2 times every day  
magnesium 250 mg tablet take 1 tablet by oral route  every day  
metoprolol succinate  mg tablet,extended release 24 hr take 1 tablet by oral route  every day  
potassium gluconate 500 mg (83 mg) tablet daily Pradaxa 150 mg capsule take 1 capsule by oral route 2 times every day Premarin 0.625 mg/gram vaginal cream insert 0.5 applicatorful by vaginal route  every day cyclically, 3 weeks on and 1 week off  
venlafaxine 75 mg tablet 2 po bid Vitamin D3 1,000 unit capsule take 1 daily  
 
____________________________________________________________________________________________________ CARDIAC HISTORY 
CAD: 
   
1 NSTEMI [95% Proximal LAD, Resolute (ISELA) to the Proximal LAD.] - 4/8/2014 CHF/CM: 
   
1 Mixed ischemic/tachycardic CM. [Nl TSH.] - 4/2014  
2 Ischemic & tachycardic Cardiomyopathy [Echo (EF 0.45) - 06/17/2014, (prev EF 20-30%)] - 6/2014 ARRHYTHMIA: 
   
1 A Fib [DCCV, Plan: amio started; Eliquis with Effient (change eliquis to asa if NSR after 30 days). ] - 4/8/2014  
2 PAF - 8/2010  
3 A Fib, recurrent; and 6/2014. [Had marked sinus bradycardia while on bblocker/dig.] - 5/2014  
4 Sinus Bradycardia. - 6/2/2014  
5 PPM (Devices (Dual Chamber PPM (Balaji Osorio)) - 7/17/2014) - 7/17/2014 6 PAF [CVR; Eliquis restarted (plavix stopped). ] - 9/2015  
7 A Fib [RFA] - 4/2016  
8 A Fib [RFA] - 1/2017 RISK FACTORS: 
   
1 Diabetes [Diagnosed in 2014] 2 Hypertension 3 Dyslipidemia CARDIOVASCULAR PROCEDURES Procedure Date Results Cardiac PET 09/24/2018 EF 0.16 (16%), physiologic apical thinning with no ischemia Cath 04/08/2014 95% Proximal LAD, Resolute (ISELA) to the Proximal LAD. DCCV 04/08/2014 Initial Rhythm A Fib, Final Rhythm Sinus Devices 07/17/2014 Dual Chamber PPM (Brandywine Lax) Echo 11/28/2017 EF 0.45 (45%), Mild Global Hypo, Mild TR, Mild LVH, Mild MR, Est RVSP 41 mmHg, Normal RV. (probable trileaflet AoV). Echo 10/30/2015 EF 0.45 (45%), Mild LVH, LA Diam 4.1 cm, Est RVSP 35 mmHg, Normal RV. ?bicuspid AoV; (Prob trileaflet on previous ANGELITO). Echo 06/17/2014 EF 0.45 (45%), EF range 45%-50%, Mild LV dilatation. Mild LV systolic dysfunction. ? Bicuspid AoV but prob trileafet on previous ANGELITO. Echo 04/03/2014 EF 0.20 (20%), global HK with lateral sparing; no valve disease. EKG 10/02/2018 Atrial Fib, ; nonspecific T-wave flattening. Holter 06/09/2014 No Malignant Arrhythmias, Atrial Fib, No correlation with symptoms, (, [address] Holter 04/30/2014 No Malignant Arrhythmias, Atrial Fib, No correlation with symptoms, \"light or heavy chest\" = afib in 60s. HR  (ave 63). ASx pauses (upto 2.8) while asleep. RFA 01/19/2017 Indication A Fib, Final Rhythm Sinus RFA  Indication A Fib Sleep Study  OSAS: Moderate ANGELITO 04/08/2014 EF 0.35 (35%), No BRAYDON Clot, prob trileaflet AoV. ACTIVE ALLERGIES: 
Ingredient Reaction Comment SACUBITRIL Lauren Sanjay is too expensive SULFA (SULFONAMIDE ANTIBIOTICS) AMOXICILLIN TRIHYDRATE    
POTASSIUM CLAVULANATE ACTIVE INTOLERANCES: 
Description Reaction Comment SACUBITRIL Lauren Grace is too expensive PROBLEM LIST: 
Problem Description Chronic Benign essential HTN Y  
DM type 2 N A-fib Y  
CAD Y  
 Mixed hyperlipidemia Y  
 
 
PAST MEDICAL/SURGICAL HISTORY  (Detailed) Disease/disorder Onset Date Management Date Comments  
  hysterectomy A-FIB Cardiovascular disease CHF Hyperlipidemia Hypertension SHAYLA: moderate. 2014 Family History  (Detailed) Relationship Family Member Name  Age at Death Condition Onset Age Cause of Death Brother    Hypertension  N Mother    PAD  N Mother    Hypertension  N Mother    Cardiac arrhythmias  N Sister    Diabetes mellitus  N  
 
SOCIAL HISTORY  (Detailed) Preferred language is [place]. EDUCATION/EMPLOYMENT/OCCUPATION Employment History Status Retired Restrictions  
  retired    
  retired MARITAL STATUS/FAMILY/SOCIAL SUPPORT Currently . For other plans, see orders. Hospital problem list  
Active Hospital Problems Diagnosis Date Noted  AYLIN (acute kidney injury) ([facility] 10/13/2018 Suspect prerenal 
  
 Atrial fibrillation with rapid ventricular response ([address] 10/07/2018  Hypokalemia 10/07/2018  Acute respiratory failure with hypoxemia ([address] 10/06/2018  Type 2 diabetes mellitus with diabetic neuropathy, without long-term current use of insulin ([address] 2016  Acute systolic heart failure ([facility] 2014 Subjective: Aníbal [place] reports Chest pain X none  consistent with:  Non-cardiac CP Atypical CP None now  On going  Anginal CP Dyspnea  none  at rest  with exertion    
   x improved  unchanged  worse PND X none  overnight Orthopnea X none  improved  unchanged  worse Presyncope X none  improved  unchanged  worse Ambulated in hallway without symptoms   Yes Ambulated in room without symptoms  Yes Objective:  
 Physical Exam: 
Overall VSSAF;   
Visit Vitals /84 (BP 1 Location: Right arm, BP Patient Position: At rest) Pulse 85 Temp 97.8 °F (36.6 °C) Resp 16 Ht 5' 9\" (1.753 m) Wt 104.9 kg (231 lb 4.2 oz) SpO2 98% Breastfeeding? No  
BMI 34.15 kg/m² Temp (24hrs), Av.8 °F (36.6 °C), Min:97.4 °F (36.3 °C), Max:98.1 °F (36.7 °C) Patient Vitals for the past 8 hrs: 
 Pulse 10/18/18 0757 85  
10/18/18 0348 84 Patient Vitals for the past 8 hrs: 
 Resp 10/18/18 0757 16  
10/18/18 0348 17 Patient Vitals for the past 8 hrs: 
 BP  
10/18/18 0757 117/84  
10/18/18 0348 123/77 Intake/Output Summary (Last 24 hours) at 10/18/2018 0454 Last data filed at 10/18/2018 0501 Gross per 24 hour Intake 370 ml Output 1000 ml Net -630 ml General Appearance: Well developed, well nourished, no acute distress. Ears/Nose/Mouth/Throat:   Normal MM; anicteric. JVP: WNL Resp:   clear to auscultation bilaterally. Nl resp effort. Cardiovascular:  IRRR, S1, S2 normal, no new murmur. No gallop or rub. Abdomen:   Soft, non-tender, bowel sounds are present. Extremities: Min edema bilaterally. Skin: 
Neuro: Warm and dry. A/O x3, grossly nonfocal  
cath site intact w/o hematoma or new bruit; distal pulse unchanged  Yes Data Review:    
Telemetry independently reviewed  sinus x chronic afib  parox afib  NSVT  
 
ECG independently reviewed  NSR  afib  no significant changes  NSST-Tw chgs  
x no new ECG provided for review Lab results reviewed as noted below. Current medications reviewed as noted below. No results for input(s): PH, PCO2, PO2 in the last 72 hours. No results for input(s): CPK, CKMB, CKNDX, TROIQ in the last 72 hours. Recent Labs 10/18/18 
7776 10/17/18 
0326 10/16/18 
7204  143 144  
K 4.0 4.0 4.2 * 110* 111* CO2 27 27 29 BUN 30* 33* 38* CREA 1.45* 1.50* 1.47* GFRAA 44* 42* 43* GLU 89 95 87  
CA 8.5 8.1* 8.1* WBC  --  5.9  --   
HGB  --  10.6*  --   
HCT  --  33.8*  --   
 PLT  --  150  -- Lab Results Component Value Date/Time Cholesterol, total 104 08/01/2018 10:17 AM  
 HDL Cholesterol 33 (L) 08/01/2018 10:17 AM  
 LDL, calculated 49 08/01/2018 10:17 AM  
 Triglyceride 110 08/01/2018 10:17 AM  
 CHOL/HDL Ratio 4.7 04/03/2014 03:30 PM  
 
No results for input(s): SGOT, GPT, AP, TBIL, TP, ALB, GLOB, GGT, AML, LPSE in the last 72 hours. No lab exists for component: AMYP, HLPSE No results for input(s): INR, PTP, APTT in the last 72 hours. No lab exists for component: INREXT, INREXT No components found for: [place] Current Facility-Administered Medications Medication Dose Route Frequency  lactobac ac& pc-s.therm-b.anim (JNONIE Q/RISAQUAD)  1 Cap Oral DAILY  metoprolol succinate (TOPROL-XL) XL tablet 50 mg  50 mg Oral DAILY  lisinopril (PRINIVIL, ZESTRIL) tablet 2.5 mg  2.5 mg Oral DAILY  bumetanide (BUMEX) tablet 1 mg  1 mg Oral DAILY  potassium chloride SR (KLOR-CON 10) tablet 10 mEq  10 mEq Oral DAILY  amiodarone (CORDARONE) tablet 400 mg  400 mg Oral BID  conjugated estrogens (PREMARIN) 0.625 mg/gram vaginal cream 0.5 g  0.5 g Vaginal EVERY TU & FRI  insulin lispro (HUMALOG) injection   SubCUTAneous AC&HS  
 glucose chewable tablet 16 g  4 Tab Oral PRN  
 dextrose (D50W) injection syrg 12.5-25 g  12.5-25 g IntraVENous PRN  
 glucagon (GLUCAGEN) injection 1 mg  1 mg IntraMUSCular PRN  
 venlafaxine-SR (EFFEXOR-XR) capsule 150 mg  150 mg Oral BID  magnesium oxide (MAG-OX) tablet 400 mg  400 mg Oral DAILY  pregabalin (LYRICA) capsule 200 mg  200 mg Oral BID  aspirin chewable tablet 81 mg  81 mg Oral DAILY  atorvastatin (LIPITOR) tablet 40 mg  40 mg Oral QHS  sodium chloride (NS) flush 5-10 mL  5-10 mL IntraVENous Q8H  
 sodium chloride (NS) flush 5-10 mL  5-10 mL IntraVENous PRN  
 acetaminophen (TYLENOL) tablet 650 mg  650 mg Oral Q6H PRN  
 docusate sodium (COLACE) capsule 100 mg  100 mg Oral DAILY PRN  
  
 
 Samir Lala MD

## 2018-10-18 NOTE — PROGRESS NOTES
Chart reviewed. Attempted to see pt for PT intervention however pt currently off the floor for PPM. Will defer however continue to follow.  
 
Bonnie Elliott, PT, DPT

## 2018-10-19 ENCOUNTER — APPOINTMENT (OUTPATIENT)
Dept: NON INVASIVE DIAGNOSTICS | Age: 68
DRG: 226 | End: 2018-10-19
Attending: INTERNAL MEDICINE
Payer: MEDICARE

## 2018-10-19 LAB
ANION GAP SERPL CALC-SCNC: 6 MMOL/L (ref 5–15)
BASOPHILS # BLD: 0 K/UL (ref 0–0.1)
BASOPHILS NFR BLD: 0 % (ref 0–1)
BUN SERPL-MCNC: 24 MG/DL (ref 6–20)
BUN/CREAT SERPL: 17 (ref 12–20)
CALCIUM SERPL-MCNC: 8 MG/DL (ref 8.5–10.1)
CHLORIDE SERPL-SCNC: 112 MMOL/L (ref 97–108)
CO2 SERPL-SCNC: 28 MMOL/L (ref 21–32)
CREAT SERPL-MCNC: 1.38 MG/DL (ref 0.55–1.02)
DIFFERENTIAL METHOD BLD: ABNORMAL
EOSINOPHIL # BLD: 0.1 K/UL (ref 0–0.4)
EOSINOPHIL NFR BLD: 2 % (ref 0–7)
ERYTHROCYTE [DISTWIDTH] IN BLOOD BY AUTOMATED COUNT: 18.4 % (ref 11.5–14.5)
GLUCOSE BLD STRIP.AUTO-MCNC: 122 MG/DL (ref 65–100)
GLUCOSE BLD STRIP.AUTO-MCNC: 82 MG/DL (ref 65–100)
GLUCOSE BLD STRIP.AUTO-MCNC: 92 MG/DL (ref 65–100)
GLUCOSE BLD STRIP.AUTO-MCNC: 98 MG/DL (ref 65–100)
GLUCOSE SERPL-MCNC: 95 MG/DL (ref 65–100)
HCT VFR BLD AUTO: 35 % (ref 35–47)
HGB BLD-MCNC: 11 G/DL (ref 11.5–16)
IMM GRANULOCYTES # BLD: 0 K/UL (ref 0–0.04)
IMM GRANULOCYTES NFR BLD AUTO: 1 % (ref 0–0.5)
LYMPHOCYTES # BLD: 1.2 K/UL (ref 0.8–3.5)
LYMPHOCYTES NFR BLD: 19 % (ref 12–49)
MCH RBC QN AUTO: 28.9 PG (ref 26–34)
MCHC RBC AUTO-ENTMCNC: 31.4 G/DL (ref 30–36.5)
MCV RBC AUTO: 92.1 FL (ref 80–99)
MONOCYTES # BLD: 0.5 K/UL (ref 0–1)
MONOCYTES NFR BLD: 8 % (ref 5–13)
NEUTS SEG # BLD: 4.6 K/UL (ref 1.8–8)
NEUTS SEG NFR BLD: 71 % (ref 32–75)
NRBC # BLD: 0 K/UL (ref 0–0.01)
NRBC BLD-RTO: 0 PER 100 WBC
PLATELET # BLD AUTO: 129 K/UL (ref 150–400)
PMV BLD AUTO: 12.4 FL (ref 8.9–12.9)
POTASSIUM SERPL-SCNC: 4.1 MMOL/L (ref 3.5–5.1)
RBC # BLD AUTO: 3.8 M/UL (ref 3.8–5.2)
SERVICE CMNT-IMP: ABNORMAL
SERVICE CMNT-IMP: NORMAL
SODIUM SERPL-SCNC: 146 MMOL/L (ref 136–145)
WBC # BLD AUTO: 6.4 K/UL (ref 3.6–11)

## 2018-10-19 PROCEDURE — 02K83ZZ MAP CONDUCTION MECHANISM, PERCUTANEOUS APPROACH: ICD-10-PCS | Performed by: INTERNAL MEDICINE

## 2018-10-19 PROCEDURE — 74011250636 HC RX REV CODE- 250/636

## 2018-10-19 PROCEDURE — 4A0234Z MEASUREMENT OF CARDIAC ELECTRICAL ACTIVITY, PERCUTANEOUS APPROACH: ICD-10-PCS | Performed by: INTERNAL MEDICINE

## 2018-10-19 PROCEDURE — 74011250637 HC RX REV CODE- 250/637: Performed by: INTERNAL MEDICINE

## 2018-10-19 PROCEDURE — 82962 GLUCOSE BLOOD TEST: CPT

## 2018-10-19 PROCEDURE — 65660000000 HC RM CCU STEPDOWN

## 2018-10-19 PROCEDURE — C1894 INTRO/SHEATH, NON-LASER: HCPCS

## 2018-10-19 PROCEDURE — 77030018729 HC ELECTRD DEFIB PAD CARD -B

## 2018-10-19 PROCEDURE — C1733 CATH, EP, OTHR THAN COOL-TIP: HCPCS

## 2018-10-19 PROCEDURE — 02583ZZ DESTRUCTION OF CONDUCTION MECHANISM, PERCUTANEOUS APPROACH: ICD-10-PCS | Performed by: INTERNAL MEDICINE

## 2018-10-19 PROCEDURE — 85025 COMPLETE CBC W/AUTO DIFF WBC: CPT | Performed by: INTERNAL MEDICINE

## 2018-10-19 PROCEDURE — 36415 COLL VENOUS BLD VENIPUNCTURE: CPT | Performed by: INTERNAL MEDICINE

## 2018-10-19 PROCEDURE — 93650 ICAR CATH ABLTJ AV NODE FUNC: CPT

## 2018-10-19 PROCEDURE — 77030029065 HC DRSG HEMO QCLOT ZMED -B

## 2018-10-19 PROCEDURE — 80048 BASIC METABOLIC PNL TOTAL CA: CPT | Performed by: INTERNAL MEDICINE

## 2018-10-19 PROCEDURE — 74011250636 HC RX REV CODE- 250/636: Performed by: INTERNAL MEDICINE

## 2018-10-19 RX ORDER — DABIGATRAN ETEXILATE 150 MG/1
150 CAPSULE ORAL 2 TIMES DAILY
Status: DISCONTINUED | OUTPATIENT
Start: 2018-10-20 | End: 2018-10-22 | Stop reason: HOSPADM

## 2018-10-19 RX ORDER — LIDOCAINE HYDROCHLORIDE 10 MG/ML
INJECTION, SOLUTION EPIDURAL; INFILTRATION; INTRACAUDAL; PERINEURAL
Status: COMPLETED
Start: 2018-10-19 | End: 2018-10-19

## 2018-10-19 RX ORDER — LIDOCAINE HYDROCHLORIDE 10 MG/ML
1-40 INJECTION INFILTRATION; PERINEURAL
Status: DISCONTINUED | OUTPATIENT
Start: 2018-10-19 | End: 2018-10-19 | Stop reason: HOSPADM

## 2018-10-19 RX ORDER — DABIGATRAN ETEXILATE 150 MG/1
150 CAPSULE ORAL 2 TIMES DAILY
Status: DISCONTINUED | OUTPATIENT
Start: 2018-10-20 | End: 2018-10-19

## 2018-10-19 RX ORDER — SODIUM CHLORIDE 0.9 % (FLUSH) 0.9 %
5-10 SYRINGE (ML) INJECTION EVERY 8 HOURS
Status: DISCONTINUED | OUTPATIENT
Start: 2018-10-19 | End: 2018-10-22 | Stop reason: HOSPADM

## 2018-10-19 RX ORDER — DABIGATRAN ETEXILATE 75 MG/1
75 CAPSULE ORAL 2 TIMES DAILY
Status: DISCONTINUED | OUTPATIENT
Start: 2018-10-20 | End: 2018-10-19 | Stop reason: DRUGHIGH

## 2018-10-19 RX ORDER — HEPARIN SODIUM 200 [USP'U]/100ML
INJECTION, SOLUTION INTRAVENOUS
Status: COMPLETED
Start: 2018-10-19 | End: 2018-10-19

## 2018-10-19 RX ORDER — ATROPINE SULFATE 1 MG/ML
1 INJECTION, SOLUTION INTRAVENOUS ONCE
Status: COMPLETED | OUTPATIENT
Start: 2018-10-19 | End: 2018-10-19

## 2018-10-19 RX ORDER — MIDAZOLAM HYDROCHLORIDE 1 MG/ML
1-5 INJECTION, SOLUTION INTRAMUSCULAR; INTRAVENOUS
Status: DISCONTINUED | OUTPATIENT
Start: 2018-10-19 | End: 2018-10-19 | Stop reason: HOSPADM

## 2018-10-19 RX ORDER — FENTANYL CITRATE 50 UG/ML
12.5-5 INJECTION, SOLUTION INTRAMUSCULAR; INTRAVENOUS
Status: DISCONTINUED | OUTPATIENT
Start: 2018-10-19 | End: 2018-10-19 | Stop reason: HOSPADM

## 2018-10-19 RX ORDER — FENTANYL CITRATE 50 UG/ML
INJECTION, SOLUTION INTRAMUSCULAR; INTRAVENOUS
Status: COMPLETED
Start: 2018-10-19 | End: 2018-10-19

## 2018-10-19 RX ORDER — METOPROLOL SUCCINATE 25 MG/1
25 TABLET, EXTENDED RELEASE ORAL DAILY
Status: DISCONTINUED | OUTPATIENT
Start: 2018-10-20 | End: 2018-10-22 | Stop reason: HOSPADM

## 2018-10-19 RX ORDER — MIDAZOLAM HYDROCHLORIDE 1 MG/ML
INJECTION, SOLUTION INTRAMUSCULAR; INTRAVENOUS
Status: COMPLETED
Start: 2018-10-19 | End: 2018-10-19

## 2018-10-19 RX ORDER — ENOXAPARIN SODIUM 150 MG/ML
105 INJECTION SUBCUTANEOUS ONCE
Status: DISCONTINUED | OUTPATIENT
Start: 2018-10-19 | End: 2018-10-19

## 2018-10-19 RX ORDER — HEPARIN SODIUM 200 [USP'U]/100ML
500 INJECTION, SOLUTION INTRAVENOUS ONCE
Status: COMPLETED | OUTPATIENT
Start: 2018-10-19 | End: 2018-10-19

## 2018-10-19 RX ORDER — SODIUM CHLORIDE 0.9 % (FLUSH) 0.9 %
5-10 SYRINGE (ML) INJECTION AS NEEDED
Status: DISCONTINUED | OUTPATIENT
Start: 2018-10-19 | End: 2018-10-22 | Stop reason: HOSPADM

## 2018-10-19 RX ADMIN — PREGABALIN 200 MG: 100 CAPSULE ORAL at 18:16

## 2018-10-19 RX ADMIN — Medication 400 MG: at 08:56

## 2018-10-19 RX ADMIN — ACETAMINOPHEN 650 MG: 325 TABLET ORAL at 22:57

## 2018-10-19 RX ADMIN — Medication 10 ML: at 04:32

## 2018-10-19 RX ADMIN — MIDAZOLAM HYDROCHLORIDE 1 MG: 1 INJECTION, SOLUTION INTRAMUSCULAR; INTRAVENOUS at 15:23

## 2018-10-19 RX ADMIN — Medication 10 ML: at 21:46

## 2018-10-19 RX ADMIN — HEPARIN SODIUM 1000 UNITS: 200 INJECTION, SOLUTION INTRAVENOUS at 15:53

## 2018-10-19 RX ADMIN — POTASSIUM CHLORIDE 10 MEQ: 750 TABLET, FILM COATED, EXTENDED RELEASE ORAL at 08:56

## 2018-10-19 RX ADMIN — VENLAFAXINE HYDROCHLORIDE 150 MG: 150 CAPSULE, EXTENDED RELEASE ORAL at 18:15

## 2018-10-19 RX ADMIN — VENLAFAXINE HYDROCHLORIDE 150 MG: 150 CAPSULE, EXTENDED RELEASE ORAL at 08:56

## 2018-10-19 RX ADMIN — Medication 10 ML: at 16:36

## 2018-10-19 RX ADMIN — HEPARIN SODIUM 1000 UNITS: 200 INJECTION, SOLUTION INTRAVENOUS at 15:22

## 2018-10-19 RX ADMIN — MIDAZOLAM HYDROCHLORIDE 1 MG: 1 INJECTION, SOLUTION INTRAMUSCULAR; INTRAVENOUS at 15:51

## 2018-10-19 RX ADMIN — FENTANYL CITRATE 25 MCG: 50 INJECTION, SOLUTION INTRAMUSCULAR; INTRAVENOUS at 15:24

## 2018-10-19 RX ADMIN — LIDOCAINE HYDROCHLORIDE 10 ML: 10 INJECTION, SOLUTION EPIDURAL; INFILTRATION; INTRACAUDAL; PERINEURAL at 15:30

## 2018-10-19 RX ADMIN — Medication 10 ML: at 21:45

## 2018-10-19 RX ADMIN — ATORVASTATIN CALCIUM 40 MG: 40 TABLET, FILM COATED ORAL at 21:45

## 2018-10-19 RX ADMIN — Medication 10 ML: at 13:18

## 2018-10-19 RX ADMIN — ASPIRIN 81 MG CHEWABLE TABLET 81 MG: 81 TABLET CHEWABLE at 08:55

## 2018-10-19 RX ADMIN — METOPROLOL SUCCINATE 50 MG: 50 TABLET, EXTENDED RELEASE ORAL at 08:55

## 2018-10-19 RX ADMIN — ATROPINE SULFATE 1 MG: 1 INJECTION, SOLUTION INTRAMUSCULAR; INTRAVENOUS; SUBCUTANEOUS at 16:15

## 2018-10-19 RX ADMIN — ACETAMINOPHEN 650 MG: 325 TABLET ORAL at 04:31

## 2018-10-19 RX ADMIN — FENTANYL CITRATE 25 MCG: 50 INJECTION, SOLUTION INTRAMUSCULAR; INTRAVENOUS at 15:37

## 2018-10-19 RX ADMIN — Medication 1 CAPSULE: at 08:55

## 2018-10-19 RX ADMIN — AMIODARONE HYDROCHLORIDE 200 MG: 200 TABLET ORAL at 08:55

## 2018-10-19 RX ADMIN — SODIUM CHLORIDE 500 ML: 900 INJECTION, SOLUTION INTRAVENOUS at 16:18

## 2018-10-19 RX ADMIN — PREGABALIN 200 MG: 100 CAPSULE ORAL at 08:55

## 2018-10-19 NOTE — PROGRESS NOTES
S/p EP study with AV node ablation, complete heart block with escape <30 bpm noted in atrial fibrillation. BIV-ICD pacing base rate set to 80. Full note to follow.

## 2018-10-19 NOTE — PROGRESS NOTES
Occupational Therapy Medical record reviewed. Pt is to have ablation this date. Will defer and continue to follow.

## 2018-10-19 NOTE — PROGRESS NOTES
Chart review. SAH at [facility] is full until 10/23/2018. Woo Sanchez RN [facility] is following chart. 1145am - 
Woo Sanchez RN [facility] has met with patient and informed of delay to admit at [facility]. Patient is not interested in going to [address] [place] [address]. Chart review - most recent PTOT notes recommend IP Rehab.  10/17/2018.   
 
1330 -  reviewed SAH status with Mr. Rito Mcdermott. He would like referral submitted to [address] [place] [address] and Rehab. FOC signed and copy on chart. Referral submitted. [facility] can accept. Room [address] 
RN to call report to:  [phone]  can transport at time of discharge. CM will continue to follow for discharge planning. Ron Tan RN CM Ext [phone]

## 2018-10-19 NOTE — PROGRESS NOTES
Deferred visit: Noted patient is to have an ablation today. Will defer today and continue to follow.

## 2018-10-19 NOTE — PROGRESS NOTES
[address] [place] [address] 
[phone] Medical Progress Note NAME: Cinthia Felty :  1950 MRM:  [id number] Date/Time: 10/19/2018  7:36 AM 
  
Subjective: She was admitted with acute respiratory failure with hypoxemia due to systolic CHF associated with atrial fib with RVR. Bedside EF 15-20%. Chest xray showed moderate CHF. Respiratory failure rapidly progressed with subsequent intubation. Patient on pressors for short period of time. Afib controlled with amiodarone with exacerbations due to hypokalemia related to diuresis. Successfully extubated  complicated by stridor. Patient currently awake,oriented x3. Currently in afib 70-90's . O2 sat's adequate  K+ 4.0. Renal function improved with creatinine of 1.45. No new complaints. Had AICD placed yesterday and foe Ablation today. She has no other c/o currently except generally weak. Past Medical History:  
Diagnosis Date  Anxiety 2018  Arthritis OA  Asthma  Diabetes ([address]  Essential hypertension  GERD (gastroesophageal reflux disease)  Hypercholesterolemia 2018  Hypertension  Long-term use of high-risk medication 2018  Neuropathy  Other ill-defined conditions(799.89) IBS, spinal stenosis  Plantar fasciitis  Psychiatric disorder   
 depression, anxiety  Sleep apnea   
 uses CPAP  Type 2 diabetes mellitus with diabetic neuropathy, without long-term current use of insulin ([address] 2016 ROS:  General: postive for weakness Eyes: Negative for visual changes ENT: Negative for ST or congestion Respiratory: negative for cough, congestion Cardiology:  negative for chest pain, palpitations, 
Gastrointestinal: negative for abdominal pain, N/V Muskuloskeletal : negative for arthralgia, myalgia Neurological: negative for headache, dizziness or focal deficits Psychological: positive for less anxiety Review of systems otherwise negative Objective:  
 
 
Vitals:  
 
  
Last 24hrs VS reviewed since prior progress note. Most recent are: 
 
Visit Vitals /64 (BP 1 Location: Right arm, BP Patient Position: At rest) Pulse 98 Temp 97.7 °F (36.5 °C) Resp 16 Ht 5' 9\" (1.753 m) Wt 231 lb 4.2 oz (104.9 kg) SpO2 96% Breastfeeding? No  
BMI 34.15 kg/m² SpO2 Readings from Last 6 Encounters:  
10/19/18 96% 09/10/18 96% 08/07/18 94% 08/01/18 96% 03/14/18 96% 02/22/18 97% O2 Flow Rate (L/min): 2 l/min Intake/Output Summary (Last 24 hours) at 10/19/2018 8412 Last data filed at 10/19/2018 7644 Gross per 24 hour Intake 820 ml Output 1075 ml Net -255 ml Telemetry reviewed:   afib Tubes:   Vargas, central line X-Ray:  Admission chest xray - moderated CHF Procedures:   Echo - Left ventricle: Ejection fraction was estimated in the range of 15 % to 20 
%. There was diffuse hypokinesis. Mitral valve: There was mild regurgitation. Exam:  
 
General   well developed, well nourished, appears stated age in no respiratory distress Eyes  Anicteric ENT  No thrush Neck   Supple without nodes or mass. No thyromegaly or bruit Respiratory   Lungs much clearer Cardiology  RRR without murmur Abdominal  Soft, non-tender, non-distended, positive bowel sounds, no hepatosplenomegaly Extremities  Without LE edema Skin  Normal skin turgor. No rashes or skin ulcers noted Neurological No focal deficit noted Psychological  Alert, oriented x3 Exam otherwise negative Lab Data Reviewed: (see below) Medications Reviewed: (see below) 
 
______________________________________________________________________ Medications:  
 
Current Facility-Administered Medications Medication Dose Route Frequency  vancomycin (VANCOCIN) 1,000 mg injection  amiodarone (CORDARONE) tablet 200 mg  200 mg Oral BID  sodium chloride (NS) flush 5-10 mL  5-10 mL IntraVENous Q8H  
  sodium chloride (NS) flush 5-10 mL  5-10 mL IntraVENous PRN  
 lactobac ac& pc-s.therm-b.anim (JONNIE Q/RISAQUAD)  1 Cap Oral DAILY  metoprolol succinate (TOPROL-XL) XL tablet 50 mg  50 mg Oral DAILY  lisinopril (PRINIVIL, ZESTRIL) tablet 2.5 mg  2.5 mg Oral DAILY  bumetanide (BUMEX) tablet 1 mg  1 mg Oral DAILY  potassium chloride SR (KLOR-CON 10) tablet 10 mEq  10 mEq Oral DAILY  conjugated estrogens (PREMARIN) 0.625 mg/gram vaginal cream 0.5 g  0.5 g Vaginal EVERY TU & FRI  insulin lispro (HUMALOG) injection   SubCUTAneous AC&HS  
 glucose chewable tablet 16 g  4 Tab Oral PRN  
 dextrose (D50W) injection syrg 12.5-25 g  12.5-25 g IntraVENous PRN  
 glucagon (GLUCAGEN) injection 1 mg  1 mg IntraMUSCular PRN  
 venlafaxine-SR (EFFEXOR-XR) capsule 150 mg  150 mg Oral BID  magnesium oxide (MAG-OX) tablet 400 mg  400 mg Oral DAILY  pregabalin (LYRICA) capsule 200 mg  200 mg Oral BID  aspirin chewable tablet 81 mg  81 mg Oral DAILY  atorvastatin (LIPITOR) tablet 40 mg  40 mg Oral QHS  sodium chloride (NS) flush 5-10 mL  5-10 mL IntraVENous Q8H  
 sodium chloride (NS) flush 5-10 mL  5-10 mL IntraVENous PRN  
 acetaminophen (TYLENOL) tablet 650 mg  650 mg Oral Q6H PRN  
 docusate sodium (COLACE) capsule 100 mg  100 mg Oral DAILY PRN Lab Review:  
 
Recent Labs 10/19/18 
1645 10/17/18 
6269 WBC 6.4 5.9 HGB 11.0* 10.6* HCT 35.0 33.8*  
* 150 Recent Labs 10/19/18 
1489 10/18/18 
8340 10/17/18 
4468 * 144 143  
K 4.1 4.0 4.0  
* 110* 110* CO2 28 27 27 GLU 95 89 95 BUN 24* 30* 33* CREA 1.38* 1.45* 1.50* CA 8.0* 8.5 8.1* Lab Results Component Value Date/Time  Glucose (POC) 92 10/19/2018 07:33 AM  
 Glucose (POC) 143 (H) 10/18/2018 09:43 PM  
 Glucose (POC) 139 (H) 10/18/2018 05:58 PM  
 Glucose (POC) 87 10/18/2018 03:16 PM  
 Glucose (POC) 94 10/18/2018 11:50 AM  
 
 No results for input(s): PH, PCO2, PO2, HCO3, FIO2 in the last 72 hours. No results for input(s): INR in the last 72 hours. No lab exists for component: INREXT, INREXT Assessment:  
 
Principal Problem: 
  Acute respiratory failure with hypoxemia ([address] (10/6/2018) Active Problems: 
  Acute systolic heart failure ([address] (4/4/2014) Type 2 diabetes mellitus with diabetic neuropathy, without long-term current use of insulin ([address] (6/5/2016) Atrial fibrillation with rapid ventricular response ([address] (10/7/2018) Hypokalemia (10/7/2018) AYLIN (acute kidney injury) ([address] (10/13/2018) Overview: Suspect prerenal 
 
 
 
  
Plan:  
 
Risk of deterioration: high 1. Continue O2 as needed 2. Continue oral bumex, amiodarone 3. Follow renal function which is a little better today 4. Continue oral KCl, Mg++ replacement. 5. Follow labs,sat's 6. BIV-ICD upgrade done yesterday 7. AV node ablation today 8. Awaiting decision regarding inpatient rehab at [facility] when discharged Care Plan discussed with: Nursing Staff, patient Discussed:  Care Plan Prophylaxis:  Lovenox and SCD's Disposition:  Unknown 
        
___________________________________________________ Attending Physician: Iris Ding MD

## 2018-10-19 NOTE — PROGRESS NOTES
1910-  Rec'd bedside report from [place], [facility]. Pt resting quietly with eyes closed. Left arm in sling. Ice pack to left anterior chest.  Cardiac monitor reading paced. 65-  Pt stated to nurse that she did not think her purewick was working. Pt stated that she felt \"wet\". Noted no urine in suction canister for purewick. Pt cleaned, bed linens changed. Assisted pt out of bed after bilateral groins shaved and prepped for procedure tomorrow. Pt ambulated ot bathroom with use of cane and nurse assistance. Once back in bed, consent for \"AV node ablation\" signed by pt. New purewick in place and to suction. 2300-  Pt informed nurse that she did not think she was going to use her CPAP tonight. Reinforced to pt that is she changed her mind to call for nurse to assist in applying head gear. 0700-  Bedside report given to Ezio Jackson RN. He will assume care of pt.

## 2018-10-19 NOTE — PROGRESS NOTES
Cardiology Progress Note 10/19/2018     Admit Date: 10/6/2018 Admit Diagnosis: Acute respiratory failure (HCC)  CC: none currently Cardiac Assessment/Plan:  
Ms Nat Starr has a h/o: 
1) CAD (LAD ISELA 2014 for NQMI), Neg PET for ischemia 9/2018. 
2) mixed ischemic/tachycardia mediated CM 4/2014 (EF 20%); EF 45% 11/2017. Alvira Light was too expensive. 3) HTN, 4) PAfib (RFA 4/2016 and 1/2017), Recurrrent/persistent afib 2017/2018 5) DM,  
6) SHAYLA (on CPAP),  
7) CKD (Cr 1.5 range). Aldactone stopped in 2014. 8) St Don PPM 7/2014 for bradycardia while on therapy for AFib. CAD: LAD ISELA 2014; recent PET w/o ischemia but recurrent low EF: if not improved with med Rx, may need repeat cath. CM: Echo 10/9/18: EF 15-20%. Has diuresed well; on PO Bumex 1 qday. Tolerating BBlocker @ lower dose than previous; adding back low-dose ACEi (lisinopril 2.5 qday) HTN: off metoprolol/lisinopril with low BPs after ETT/sedation: Levophed off since evening of 10/7; change to prn Jose: restarted BBlocker (toprol XL 50 qday now). Rhythm: PAFib; recurrent; remains on PO amio; Dr Jose Rose rec: stablized CHF then considering AV node ablation/BiV PM or ICD. Cont anticoagulation (lovenox for now; PO after ablation plan/BiV upgrade decided). Renal function: Cr down to 1.7 from 2; (Cr 1.3 range typically). Resp failure, SHAYLA, DM, Dispo: per primary team.  
For other plans, see orders. 10/14: On Room air, cr still at 2, bumex PO restarted, amiodarone decreaesed from TID to bid, HR improved. 10/15: No orthopnea; much less STEEN; fell in bathroom yesterday (legs weak/poor balance): will need some rehab. No CP; PO bumex 1 qday; changed metoprolol to XL 50 qday; added lisinopril 2.5; cont amio PO bid. 10/16: Stable cardiac status; Good UOP; Cr down to 1.47; K normal; tolerating above meds. Would decrease amio to 200 bid on d/c.  Dr Jose Rose to address EP plan (change lovenox to PO when appropriate). C/O GI issues (?gastro-colic reflex); ?probiotics. 10/17: Stable cardiac status; Cr stable at 1.5; Tolerating meds; EP plan per Dr Donnell Goodpasture. 10/19: Stable cardiac status; BiV device in 10/18; AV node abation today. will increase acei/decrease bblocker after procedure. Cr stable; appropriate vol status; cont amio for now (PAfib previously): ?d/c in future if chronic afib. Back on anticoag after procedure. 
______________________________________________________________________________________________ 
@ OV 10/2/18: 
Ms Melania Moreno has a h/o: 
1) CAD (LAD ISELA 2014 for NQMI), Neg PET for ischemia 9/2018. 
2) mixed ischemic/tachycardia mediated CM 4/2014 (EF 20%); EF 45% 11/2017. Nery Maw was too expensive. 3) HTN, 4) PAfib (RFA 4/2016 and 1/2017), Recurrrent/persistent afib 2017/2018 5) DM,  
6) SHAYLA (on CPAP),  
7) CKD (Cr 1.5 range). Aldactone stopped in 2014. 8) St Don PPM 7/2014 for bradycardia while on therapy for AFib. 
 
11/2017: No CP/STEEN; Back in afib today:  Coreg changed Toprol-XL. 10/2018:  Recent ER visit with epigastric pain; negative evaluation there & seen by Dr. Teresa Saldana who set up a cardiac PET. The PET shows no ischemia but EF suggested at 16%. Of note patient does have AFib with accelerated ventricular response. No recurrence of epigastric discomfort. No bleeding. Had a simple trip at home a few weeks ago without injury. IMPRESSION AND PLAN 
  
01. (I25.10) Atherosclerotic heart disease of native coronary artery without angina pectoris:  No anginal symptoms. Cont asa and stopped plavix with need for anticoagulation. We discussed the signs and symptoms of unstable angina, myocardial infarction and malignant arrhythmia. The patient knows to seek immediate medical attention should they occur. ECG done today 02. (I42.0) Dilated cardiomyopathy:  Mixed ischemic/non-ischemic (tachycardia mediated) CM. Her ejection fraction is greater than 35% after repeat echo. The patient's systolic function has been stable. I suspect the PET has a falsely low EF related to her AFib but repeat echo is needed. 2-D w/CFD Echo to be done first available. 03. (I50.42) Chronic combined systolic (congestive) and diastolic (congestive) heart failure:  Resolved exertional symptoms. (NYHA I)  The patient is compliant with their CHF regimen. 04. (I48.1) Persistent atrial fibrillation:  Management per Dr Yoko Caldera; currently off amio and on Pradaxa. Heart rate has been to elevated; increase Toprol  q.day. Further AFib monitoring/therapy per Dr. Marleny Christian. 05. (I13.0) Hyp hrt & chr kdny dis w hrt fail and stg 1-4/unsp chr kdny:  Adequately controlled. We will see how the patient does with the med changes noted above. 06. (E78.2) Mixed hyperlipidemia:  Lipid labs drawn by PCP. The patient is tolerating lipid lowering therapy well. 07. (N18.3) Chronic kidney disease, stage 3 (moderate):  Cr 1.24/GFR46 11/2015. Cr 1.32/GFR 43 1/2017.  
08. (R60.0) Localized edema:  Resolved. She avoids salt. 09. (E11.69) Type 2 diabetes mellitus with other specified complication:  Lipids & HTN as noted above; DM managed by other MD.  
10. (Z95.0) Presence of cardiac pacemaker:  will continue to be followed in device clinic. 11. (Z79.82) Long term (current) use of aspirin:  This condition is stable. 12. (Z79.01) Long term (current) use of anticoagulants: This condition is stable. Indicated for atrial fibrillation. No bleeding. If she has recurrent falls, she knows to call for re-evaluation of anticoagulation. 13. (Z68.38) Body mass index (BMI) 38.0-38.9, adult:  The patient was instructed on AHA diet and regular exercise. ORDERS: 
    
1 ECG done today 2 2-D w/ CFD Echocardiogram first available. 3 Return office visit with Yun Rhoades. Anabel BERRY in 6 Months. 4 The patient was instructed on AHA diet and regular exercise. 10/2/18 MEDICATION LIST Medication Sig Description  
amlodipine 5 mg tablet take 1 tablet by oral route  every day per pc  
aspirin 81 mg tablet,delayed release take 1 tablet by oral route  every day  
atorvastatin 40 mg tablet take 1 tablet by oral route  every evening  
bumetanide 1 mg tablet 1 in am on tues&thursday 2 tablets on mon wed fridays Calcium 600 600 mg (1,500 mg) tablet daily  
clonazepam 0.5 mg tablet take 1 tablet by oral route  every day LISINOPRIL 20 MG Tablet TAKE 1 TABLET EVERY DAY Lyrica 200 mg capsule take 1 capsule by oral route 2 times every day  
magnesium 250 mg tablet take 1 tablet by oral route  every day  
metoprolol succinate  mg tablet,extended release 24 hr take 1 tablet by oral route  every day  
potassium gluconate 500 mg (83 mg) tablet daily Pradaxa 150 mg capsule take 1 capsule by oral route 2 times every day Premarin 0.625 mg/gram vaginal cream insert 0.5 applicatorful by vaginal route  every day cyclically, 3 weeks on and 1 week off  
venlafaxine 75 mg tablet 2 po bid Vitamin D3 1,000 unit capsule take 1 daily  
 
____________________________________________________________________________________________________ CARDIAC HISTORY 
CAD: 
   
1 NSTEMI [95% Proximal LAD, Resolute (ISELA) to the Proximal LAD.] - 4/8/2014 CHF/CM: 
   
1 Mixed ischemic/tachycardic CM. [Nl TSH.] - 4/2014  
2 Ischemic & tachycardic Cardiomyopathy [Echo (EF 0.45) - 06/17/2014, (prev EF 20-30%)] - 6/2014 ARRHYTHMIA: 
   
1 A Fib [DCCV, Plan: amio started; Eliquis with Effient (change eliquis to asa if NSR after 30 days). ] - 4/8/2014  
2 PAF - 8/2010  
3 A Fib, recurrent; and 6/2014. [Had marked sinus bradycardia while on bblocker/dig.] - 5/2014  
4 Sinus Bradycardia. - 6/2/2014  
5 PPM (Devices (Dual Chamber PPM (Moncho Shall)) - 7/17/2014) - 7/17/2014 6 PAF [CVR; Eliquis restarted (plavix stopped). ] - 9/2015  
7 A Fib [RFA] - 4/2016  
8 A Fib [RFA] - 1/2017 RISK FACTORS: 
   
1 Diabetes [Diagnosed in 2014] 2 Hypertension 3 Dyslipidemia CARDIOVASCULAR PROCEDURES Procedure Date Results Cardiac PET 09/24/2018 EF 0.16 (16%), physiologic apical thinning with no ischemia Cath 04/08/2014 95% Proximal LAD, Resolute (ISELA) to the Proximal LAD. DCCV 04/08/2014 Initial Rhythm A Fib, Final Rhythm Sinus Devices 07/17/2014 Dual Chamber PPM (Triston Links) Echo 11/28/2017 EF 0.45 (45%), Mild Global Hypo, Mild TR, Mild LVH, Mild MR, Est RVSP 41 mmHg, Normal RV. (probable trileaflet AoV). Echo 10/30/2015 EF 0.45 (45%), Mild LVH, LA Diam 4.1 cm, Est RVSP 35 mmHg, Normal RV. ?bicuspid AoV; (Prob trileaflet on previous ANGELITO). Echo 06/17/2014 EF 0.45 (45%), EF range 45%-50%, Mild LV dilatation. Mild LV systolic dysfunction. ? Bicuspid AoV but prob trileafet on previous ANGELITO. Echo 04/03/2014 EF 0.20 (20%), global HK with lateral sparing; no valve disease. EKG 10/02/2018 Atrial Fib, ; nonspecific T-wave flattening. Holter 06/09/2014 No Malignant Arrhythmias, Atrial Fib, No correlation with symptoms, (, ave 81.) Holter 04/30/2014 No Malignant Arrhythmias, Atrial Fib, No correlation with symptoms, \"light or heavy chest\" = afib in 60s. HR  (ave 63). ASx pauses (upto 2.8) while asleep. RFA 01/19/2017 Indication A Fib, Final Rhythm Sinus RFA  Indication A Fib Sleep Study  OSAS: Moderate ANGELITO 04/08/2014 EF 0.35 (35%), No BRAYDON Clot, prob trileaflet AoV. ACTIVE ALLERGIES: 
Ingredient Reaction Comment ALEX Claire is too expensive SULFA (SULFONAMIDE ANTIBIOTICS) AMOXICILLIN TRIHYDRATE    
POTASSIUM CLAVULANATE ACTIVE INTOLERANCES: 
Description Reaction Comment ALEX Claire is too expensive PROBLEM LIST: 
Problem Description Chronic Benign essential HTN Y  
DM type 2 N A-fib Y  
CAD Y  
 Mixed hyperlipidemia Y  
 
 
PAST MEDICAL/SURGICAL HISTORY  (Detailed) Disease/disorder Onset Date Management Date Comments  
  hysterectomy A-FIB Cardiovascular disease CHF Hyperlipidemia Hypertension SHAYLA: moderate. 2014 Family History  (Detailed) Relationship Family Member Name  Age at Death Condition Onset Age Cause of Death Brother    Hypertension  N Mother    PAD  N Mother    Hypertension  N Mother    Cardiac arrhythmias  N Sister    Diabetes mellitus  N  
 
SOCIAL HISTORY  (Detailed) Preferred language is [place]. EDUCATION/EMPLOYMENT/OCCUPATION Employment History Status Retired Restrictions  
  retired    
  retired MARITAL STATUS/FAMILY/SOCIAL SUPPORT Currently . For other plans, see orders. Hospital problem list  
Active Hospital Problems Diagnosis Date Noted  AYLIN (acute kidney injury) ([facility] 10/13/2018 Suspect prerenal 
  
 Atrial fibrillation with rapid ventricular response ([address] 10/07/2018  Hypokalemia 10/07/2018  Acute respiratory failure with hypoxemia ([address] 10/06/2018  Type 2 diabetes mellitus with diabetic neuropathy, without long-term current use of insulin ([facility] [address] 2016  Acute systolic heart failure ([facility] [address] 2014 Subjective: Cinthia Felty reports Chest pain X none  consistent with:  Non-cardiac CP Atypical CP None now  On going  Anginal CP Dyspnea  none  at rest  with exertion    
   x improved  unchanged  worse PND X none  overnight Orthopnea X none  improved  unchanged  worse Presyncope X none  improved  unchanged  worse Ambulated in hallway without symptoms   Yes Ambulated in room without symptoms  Yes Objective:  
 Physical Exam: 
Overall VSSAF;   
Visit Vitals /77 (BP 1 Location: Right arm, BP Patient Position: At rest) Pulse 84 Temp 98.7 °F (37.1 °C) Resp 18 Ht 5' 9\" (1.753 m) Wt 104.9 kg (231 lb 4.2 oz) SpO2 95% Breastfeeding? No  
BMI 34.15 kg/m² Temp (24hrs), Av.8 °F (36.6 °C), Min:97.5 °F (36.4 °C), Max:98.7 °F (37.1 °C) Patient Vitals for the past 8 hrs: 
 Pulse 10/19/18 0728 84  
10/19/18 0400 98 Patient Vitals for the past 8 hrs: 
 Resp 10/19/18 0728 18  
10/19/18 0400 16 Patient Vitals for the past 8 hrs: 
 BP  
10/19/18 0728 129/77  
10/19/18 0400 121/64 Intake/Output Summary (Last 24 hours) at 10/19/2018 1757 Last data filed at 10/19/2018 0543 Gross per 24 hour Intake 820 ml Output 1075 ml Net -255 ml General Appearance: Well developed, well nourished, no acute distress. Ears/Nose/Mouth/Throat:   Normal MM; anicteric. JVP: WNL Resp:   clear to auscultation bilaterally. Nl resp effort. Cardiovascular:  IRRR, S1, S2 normal, no new murmur. No gallop or rub. Abdomen:   Soft, non-tender, bowel sounds are present. Extremities: Min edema bilaterally. Skin: 
Neuro: Warm and dry. A/O x3, grossly nonfocal  
cath site intact w/o hematoma or new bruit; distal pulse unchanged  Yes Data Review:    
Telemetry independently reviewed  sinus x chronic afib  parox afib  NSVT  
 
ECG independently reviewed  NSR  afib  no significant changes  NSST-Tw chgs  
x no new ECG provided for review Lab results reviewed as noted below. Current medications reviewed as noted below. No results for input(s): PH, PCO2, PO2 in the last 72 hours. No results for input(s): CPK, CKMB, CKNDX, TROIQ in the last 72 hours. Recent Labs 10/19/18 
3831 10/18/18 
0150 10/17/18 
6888 * 144 143  
K 4.1 4.0 4.0  
* 110* 110* CO2 28 27 27 BUN 24* 30* 33* CREA 1.38* 1.45* 1.50* GFRAA 46* 44* 42* GLU 95 89 95  
CA 8.0* 8.5 8.1* WBC 6.4  --  5.9 HGB 11.0*  --  10.6* HCT 35.0  --  33.8*  
 *  --  150 Lab Results Component Value Date/Time Cholesterol, total 104 08/01/2018 10:17 AM  
 HDL Cholesterol 33 (L) 08/01/2018 10:17 AM  
 LDL, calculated 49 08/01/2018 10:17 AM  
 Triglyceride 110 08/01/2018 10:17 AM  
 CHOL/HDL Ratio 4.7 04/03/2014 03:30 PM  
 
No results for input(s): SGOT, GPT, AP, TBIL, TP, ALB, GLOB, GGT, AML, LPSE in the last 72 hours. No lab exists for component: AMYP, HLPSE No results for input(s): INR, PTP, APTT in the last 72 hours. No lab exists for component: INREXT, INREXT No components found for: [place] Current Facility-Administered Medications Medication Dose Route Frequency  vancomycin (VANCOCIN) 1,000 mg injection  amiodarone (CORDARONE) tablet 200 mg  200 mg Oral BID  sodium chloride (NS) flush 5-10 mL  5-10 mL IntraVENous Q8H  
 sodium chloride (NS) flush 5-10 mL  5-10 mL IntraVENous PRN  
 lactobac ac& pc-s.therm-b.anim (JONNIE Q/RISAQUAD)  1 Cap Oral DAILY  metoprolol succinate (TOPROL-XL) XL tablet 50 mg  50 mg Oral DAILY  lisinopril (PRINIVIL, ZESTRIL) tablet 2.5 mg  2.5 mg Oral DAILY  bumetanide (BUMEX) tablet 1 mg  1 mg Oral DAILY  potassium chloride SR (KLOR-CON 10) tablet 10 mEq  10 mEq Oral DAILY  conjugated estrogens (PREMARIN) 0.625 mg/gram vaginal cream 0.5 g  0.5 g Vaginal EVERY TU & FRI  insulin lispro (HUMALOG) injection   SubCUTAneous AC&HS  
 glucose chewable tablet 16 g  4 Tab Oral PRN  
 dextrose (D50W) injection syrg 12.5-25 g  12.5-25 g IntraVENous PRN  
 glucagon (GLUCAGEN) injection 1 mg  1 mg IntraMUSCular PRN  
 venlafaxine-SR (EFFEXOR-XR) capsule 150 mg  150 mg Oral BID  magnesium oxide (MAG-OX) tablet 400 mg  400 mg Oral DAILY  pregabalin (LYRICA) capsule 200 mg  200 mg Oral BID  aspirin chewable tablet 81 mg  81 mg Oral DAILY  atorvastatin (LIPITOR) tablet 40 mg  40 mg Oral QHS  sodium chloride (NS) flush 5-10 mL  5-10 mL IntraVENous Q8H  
  sodium chloride (NS) flush 5-10 mL  5-10 mL IntraVENous PRN  
 acetaminophen (TYLENOL) tablet 650 mg  650 mg Oral Q6H PRN  
 docusate sodium (COLACE) capsule 100 mg  100 mg Oral DAILY PRN Cehryl Arellano MD

## 2018-10-20 LAB
ANION GAP SERPL CALC-SCNC: 5 MMOL/L (ref 5–15)
BUN SERPL-MCNC: 21 MG/DL (ref 6–20)
BUN/CREAT SERPL: 16 (ref 12–20)
CALCIUM SERPL-MCNC: 8.3 MG/DL (ref 8.5–10.1)
CHLORIDE SERPL-SCNC: 111 MMOL/L (ref 97–108)
CO2 SERPL-SCNC: 28 MMOL/L (ref 21–32)
CREAT SERPL-MCNC: 1.32 MG/DL (ref 0.55–1.02)
ERYTHROCYTE [DISTWIDTH] IN BLOOD BY AUTOMATED COUNT: 18.4 % (ref 11.5–14.5)
GLUCOSE BLD STRIP.AUTO-MCNC: 101 MG/DL (ref 65–100)
GLUCOSE BLD STRIP.AUTO-MCNC: 120 MG/DL (ref 65–100)
GLUCOSE BLD STRIP.AUTO-MCNC: 125 MG/DL (ref 65–100)
GLUCOSE BLD STRIP.AUTO-MCNC: 86 MG/DL (ref 65–100)
GLUCOSE SERPL-MCNC: 73 MG/DL (ref 65–100)
HCT VFR BLD AUTO: 34.5 % (ref 35–47)
HGB BLD-MCNC: 10.6 G/DL (ref 11.5–16)
MCH RBC QN AUTO: 28.4 PG (ref 26–34)
MCHC RBC AUTO-ENTMCNC: 30.7 G/DL (ref 30–36.5)
MCV RBC AUTO: 92.5 FL (ref 80–99)
NRBC # BLD: 0 K/UL (ref 0–0.01)
NRBC BLD-RTO: 0 PER 100 WBC
PLATELET # BLD AUTO: 115 K/UL (ref 150–400)
PMV BLD AUTO: 12.6 FL (ref 8.9–12.9)
POTASSIUM SERPL-SCNC: 4.5 MMOL/L (ref 3.5–5.1)
RBC # BLD AUTO: 3.73 M/UL (ref 3.8–5.2)
SERVICE CMNT-IMP: ABNORMAL
SERVICE CMNT-IMP: NORMAL
SODIUM SERPL-SCNC: 144 MMOL/L (ref 136–145)
WBC # BLD AUTO: 7 K/UL (ref 3.6–11)

## 2018-10-20 PROCEDURE — 36415 COLL VENOUS BLD VENIPUNCTURE: CPT | Performed by: INTERNAL MEDICINE

## 2018-10-20 PROCEDURE — 74011250637 HC RX REV CODE- 250/637: Performed by: INTERNAL MEDICINE

## 2018-10-20 PROCEDURE — 65660000000 HC RM CCU STEPDOWN

## 2018-10-20 PROCEDURE — 80048 BASIC METABOLIC PNL TOTAL CA: CPT | Performed by: INTERNAL MEDICINE

## 2018-10-20 PROCEDURE — 82962 GLUCOSE BLOOD TEST: CPT

## 2018-10-20 PROCEDURE — 85027 COMPLETE CBC AUTOMATED: CPT | Performed by: INTERNAL MEDICINE

## 2018-10-20 RX ORDER — LISINOPRIL 2.5 MG/1
2.5 TABLET ORAL DAILY
Qty: 30 TAB | Refills: 12 | Status: ON HOLD
Start: 2018-10-20 | End: 2018-11-13

## 2018-10-20 RX ORDER — METOPROLOL SUCCINATE 25 MG/1
25 TABLET, EXTENDED RELEASE ORAL DAILY
Qty: 30 TAB | Refills: 12 | Status: ON HOLD
Start: 2018-10-20 | End: 2018-11-13

## 2018-10-20 RX ORDER — BUMETANIDE 1 MG/1
1 TABLET ORAL DAILY
Qty: 30 TAB | Refills: 11 | Status: ON HOLD
Start: 2018-10-20 | End: 2018-11-13

## 2018-10-20 RX ADMIN — DABIGATRAN ETEXILATE MESYLATE 150 MG: 150 CAPSULE ORAL at 09:00

## 2018-10-20 RX ADMIN — ACETAMINOPHEN 650 MG: 325 TABLET ORAL at 23:04

## 2018-10-20 RX ADMIN — Medication 10 ML: at 21:30

## 2018-10-20 RX ADMIN — ASPIRIN 81 MG CHEWABLE TABLET 81 MG: 81 TABLET CHEWABLE at 09:00

## 2018-10-20 RX ADMIN — DABIGATRAN ETEXILATE MESYLATE 150 MG: 150 CAPSULE ORAL at 17:03

## 2018-10-20 RX ADMIN — VENLAFAXINE HYDROCHLORIDE 150 MG: 150 CAPSULE, EXTENDED RELEASE ORAL at 09:00

## 2018-10-20 RX ADMIN — Medication 10 ML: at 17:02

## 2018-10-20 RX ADMIN — METOPROLOL SUCCINATE 25 MG: 25 TABLET, EXTENDED RELEASE ORAL at 09:00

## 2018-10-20 RX ADMIN — ACETAMINOPHEN 650 MG: 325 TABLET ORAL at 05:06

## 2018-10-20 RX ADMIN — LISINOPRIL 2.5 MG: 5 TABLET ORAL at 16:51

## 2018-10-20 RX ADMIN — VENLAFAXINE HYDROCHLORIDE 150 MG: 150 CAPSULE, EXTENDED RELEASE ORAL at 17:05

## 2018-10-20 RX ADMIN — Medication 10 ML: at 05:06

## 2018-10-20 RX ADMIN — BUMETANIDE 1 MG: 1 TABLET ORAL at 09:00

## 2018-10-20 RX ADMIN — PREGABALIN 200 MG: 100 CAPSULE ORAL at 17:05

## 2018-10-20 RX ADMIN — Medication 400 MG: at 09:00

## 2018-10-20 RX ADMIN — ATORVASTATIN CALCIUM 40 MG: 40 TABLET, FILM COATED ORAL at 21:30

## 2018-10-20 RX ADMIN — Medication 10 ML: at 05:07

## 2018-10-20 RX ADMIN — POTASSIUM CHLORIDE 10 MEQ: 750 TABLET, FILM COATED, EXTENDED RELEASE ORAL at 09:00

## 2018-10-20 RX ADMIN — PREGABALIN 200 MG: 100 CAPSULE ORAL at 09:01

## 2018-10-20 RX ADMIN — Medication 1 CAPSULE: at 09:00

## 2018-10-20 RX ADMIN — ACETAMINOPHEN 650 MG: 325 TABLET ORAL at 16:50

## 2018-10-20 RX ADMIN — CONJUGATED ESTROGENS 0.5 G: 0.62 CREAM VAGINAL at 21:32

## 2018-10-20 NOTE — PROGRESS NOTES
Pt medicated x2 for incisional pain . Remained in paced rhythm no c/o dizziness or sob. Pt encouraged to use walker due to pt occasional attempt to rush when ambulating making her unsteady at times . 0700 Bedside shift change report given to hortensia BIGGS  (oncoming nurse) by me (offgoing nurse). Report given with SBAR, MAR and Recent Results.

## 2018-10-20 NOTE — PROGRESS NOTES
FIONA received call from Clayton Culver in admissions with [address] [place] [address] #[phone] who wanted the status regarding. Per chart review there is not a D/C order in chart. CM informed Clayton Culver they would contact her upon Pt being D/C. CM spoke with RN who reported they would get in contact with Pt MD to check status of D/C. Pt  to transport Pt to [address] [place] [address] upon D/C. CM will continue to remain available to assist with coordination of care and D/C planning. Joann Calhoun LCSW Ext # [phone] FIONA spoke with RN who reported MD informed RN Pt would not be D/C today. Anticipate D/C on Monday. CM alerted [address] [place] [address]. Joann Calhoun LCSW Ext # [phone]

## 2018-10-20 NOTE — PROGRESS NOTES
PROGRESS NOTE 
 
NAME:  Shalini Ruiz :   1950 MRN:   [id number] Date/Time:  10/20/2018 10:44 AM 
Subjective:  
History:  \"I feel great. \" 
 
 
Medications reviewed: 
Current Facility-Administered Medications Medication Dose Route Frequency  sodium chloride (NS) flush 5-10 mL  5-10 mL IntraVENous Q8H  
 sodium chloride (NS) flush 5-10 mL  5-10 mL IntraVENous PRN  
 metoprolol succinate (TOPROL-XL) XL tablet 25 mg  25 mg Oral DAILY  dabigatran etexilate (PRADAXA) capsule 150 mg  150 mg Oral BID  vancomycin (VANCOCIN) 1,000 mg injection  sodium chloride (NS) flush 5-10 mL  5-10 mL IntraVENous Q8H  
 sodium chloride (NS) flush 5-10 mL  5-10 mL IntraVENous PRN  
 lactobac ac& pc-s.therm-b.anim (JONNIE Q/RISAQUAD)  1 Cap Oral DAILY  lisinopril (PRINIVIL, ZESTRIL) tablet 2.5 mg  2.5 mg Oral DAILY  bumetanide (BUMEX) tablet 1 mg  1 mg Oral DAILY  potassium chloride SR (KLOR-CON 10) tablet 10 mEq  10 mEq Oral DAILY  conjugated estrogens (PREMARIN) 0.625 mg/gram vaginal cream 0.5 g  0.5 g Vaginal EVERY  & FRI  insulin lispro (HUMALOG) injection   SubCUTAneous AC&HS  
 glucose chewable tablet 16 g  4 Tab Oral PRN  
 dextrose (D50W) injection syrg 12.5-25 g  12.5-25 g IntraVENous PRN  
 glucagon (GLUCAGEN) injection 1 mg  1 mg IntraMUSCular PRN  
 venlafaxine-SR (EFFEXOR-XR) capsule 150 mg  150 mg Oral BID  magnesium oxide (MAG-OX) tablet 400 mg  400 mg Oral DAILY  pregabalin (LYRICA) capsule 200 mg  200 mg Oral BID  aspirin chewable tablet 81 mg  81 mg Oral DAILY  atorvastatin (LIPITOR) tablet 40 mg  40 mg Oral QHS  sodium chloride (NS) flush 5-10 mL  5-10 mL IntraVENous Q8H  
 sodium chloride (NS) flush 5-10 mL  5-10 mL IntraVENous PRN  
 acetaminophen (TYLENOL) tablet 650 mg  650 mg Oral Q6H PRN  
 docusate sodium (COLACE) capsule 100 mg  100 mg Oral DAILY PRN Objective:  
Vitals: 
Visit Vitals /85 (BP 1 Location: Left arm, BP Patient Position: Sitting) Comment (BP Patient Position): feet on floor Pulse 80 Temp 97.6 °F (36.4 °C) Resp 18 Ht 5' 9\" (1.753 m) Wt 231 lb 4.2 oz (104.9 kg) SpO2 100% Breastfeeding? No  
BMI 34.15 kg/m² O2 Flow Rate (L/min): 2 l/min O2 Device: Room air Temp (24hrs), Av.9 °F (36.6 °C), Min:97.6 °F (36.4 °C), Max:98.2 °F (36.8 °C) Last 24hr Input/Output: 
 
Intake/Output Summary (Last 24 hours) at 10/20/2018 1044 Last data filed at 10/19/2018 1501 Gross per 24 hour Intake  Output 200 ml Net -200 ml PHYSICAL EXAM: 
General:     Alert, cooperative, no distress, appears stated age. Head:    Normocephalic, without obvious abnormality, atraumatic. Eyes:    Conjunctivae/corneas clear. PERRLA Nose:   Nares normal. No drainage or sinus tenderness. Throat:     Lips, mucosa, and tongue normal.  No Thrush Neck:   Supple, symmetrical,  no adenopathy, thyroid: non tender 
   no carotid bruit and no JVD. Back:     Symmetric,  No CVA tenderness. Lungs:    Clear to auscultation bilaterally. No Wheezing or Rhonchi. No rales. Heart:    Regular rate and rhythm,  no murmur, rub or gallop. Abdomen:    Soft, non-tender. Not distended. Bowel sounds normal. No masses Extremities:  Extremities normal, atraumatic, No cyanosis. No edema. No clubbing Lymph nodes:  Cervical, supraclavicular normal. 
Neurologic:  Normal strength, Alert and oriented X 3. Skin:                No rash Lab Data Reviewed: 
 
Recent Results (from the past 24 hour(s)) GLUCOSE, POC Collection Time: 10/19/18 11:35 AM  
Result Value Ref Range Glucose (POC) 82 65 - 100 mg/dL Performed by Enid Hooker GLUCOSE, POC Collection Time: 10/19/18  4:16 PM  
Result Value Ref Range Glucose (POC) 98 65 - 100 mg/dL Performed by Medina Lyn GLUCOSE, POC Collection Time: 10/19/18  9:44 PM  
Result Value Ref Range Glucose (POC) 122 (H) 65 - 100 mg/dL Performed by [employer] METABOLIC PANEL, BASIC Collection Time: 10/20/18  3:44 AM  
Result Value Ref Range Sodium 144 136 - 145 mmol/L Potassium 4.5 3.5 - 5.1 mmol/L Chloride 111 (H) 97 - 108 mmol/L  
 CO2 28 21 - 32 mmol/L Anion gap 5 5 - 15 mmol/L Glucose 73 65 - 100 mg/dL BUN 21 (H) 6 - 20 MG/DL Creatinine 1.32 (H) 0.55 - 1.02 MG/DL  
 BUN/Creatinine ratio 16 12 - 20 GFR est AA 49 (L) >60 ml/min/1.73m2 GFR est non-AA 40 (L) >60 ml/min/1.73m2 Calcium 8.3 (L) 8.5 - 10.1 MG/DL  
CBC W/O DIFF Collection Time: 10/20/18  3:44 AM  
Result Value Ref Range WBC 7.0 3.6 - 11.0 K/uL  
 RBC 3.73 (L) 3.80 - 5.20 M/uL  
 HGB 10.6 (L) 11.5 - 16.0 g/dL HCT 34.5 (L) 35.0 - 47.0 % MCV 92.5 80.0 - 99.0 FL  
 MCH 28.4 26.0 - 34.0 PG  
 MCHC 30.7 30.0 - 36.5 g/dL  
 RDW 18.4 (H) 11.5 - 14.5 % PLATELET 721 (L) 385 - 400 K/uL MPV 12.6 8.9 - 12.9 FL  
 NRBC 0.0 0  WBC ABSOLUTE NRBC 0.00 0.00 - 0.01 K/uL GLUCOSE, POC Collection Time: 10/20/18  7:40 AM  
Result Value Ref Range Glucose (POC) 86 65 - 100 mg/dL Performed by [employer] Assessment/Plan:  
 
Principal Problem: 
  Acute respiratory failure with hypoxemia ([facility] [address] (10/6/2018) Active Problems: 
  Acute systolic heart failure ([facility] [address] (4/4/2014) Type 2 diabetes mellitus with diabetic neuropathy, without long-term current use of insulin ([facility] [address] (6/5/2016) Atrial fibrillation with rapid ventricular response ([facility] [address] (10/7/2018) Hypokalemia (10/7/2018) AYLIN (acute kidney injury) ([address] (10/13/2018) Overview: Suspect prerenal 
 
  
___________________________________________________ PLAN: 
 
1. I think it is a bad idea to send a patient with this many medical issues to SNF on  Saturday 2. Continue current management & will discharge on Monday. 3.  : 
4. 
 
 
 
 
 
___________________________________________________ Attending Physician: Harmony Arzola MD

## 2018-10-20 NOTE — PROGRESS NOTES
Cardiology Progress Note 
10/20/2018     Admit Date: 10/6/2018 Admit Diagnosis: Acute respiratory failure (HCC)  CC: none currently Cardiac Assessment/Plan:  
Ms Amador Rubin has a h/o: 
1) CAD (LAD ISELA 2014 for NQMI), Neg PET for ischemia 9/2018. 
2) mixed ischemic/tachycardia mediated CM 4/2014 (EF 20%); EF 45% 11/2017. Vera Claude was too expensive. 3) HTN, 4) PAfib (RFA 4/2016 and 1/2017), Recurrrent/persistent afib 2017/2018 5) DM,  
6) SHAYLA (on CPAP),  
7) CKD (Cr 1.5 range). Aldactone stopped in 2014. 8) [facility] PPM 7/2014 for bradycardia while on therapy for AFib. CAD: LAD ISELA 2014; recent PET w/o ischemia but recurrent low EF: if not improved with med Rx, may need repeat cath. CM: Echo 10/9/18: EF 15-20%. Has diuresed well; on PO Bumex 1 qday. Tolerating BBlocker @ lower dose than previous; adding back low-dose ACEi (lisinopril 2.5 qday) HTN: off metoprolol/lisinopril with low BPs after ETT/sedation: Levophed off since evening of 10/7; change to prn Jose: restarted BBlocker (toprol XL 50 qday now). Rhythm: PAFib; recurrent; remains on PO amio; Dr Tena Monge rec: stablized CHF then considering AV node ablation/BiV PM or ICD. Cont anticoagulation (lovenox for now; PO after ablation plan/BiV upgrade decided). Renal function: Cr down to 1.7 from 2; (Cr 1.3 range typically). Resp failure, SHAYLA, DM, Dispo: per primary team.  
For other plans, see orders. 10/14: On Room air, cr still at 2, bumex PO restarted, amiodarone decreaesed from TID to bid, HR improved. 10/15: No orthopnea; much less STEEN; fell in bathroom yesterday (legs weak/poor balance): will need some rehab. No CP; PO bumex 1 qday; changed metoprolol to XL 50 qday; added lisinopril 2.5; cont amio PO bid. 10/17: Stable cardiac status; Cr stable at 1.5; Tolerating meds; EP plan per Dr Tena Monge. 10/20: Stable cardiac status; BiV device in 10/18; AV node abation 10/19. On lower dose bblocker after procedure. BPs marginal but ASx. Cr stable; appropriate vol status; Back on anticoag after procedure. To be off amio; D/C cardiac meds: lisinopril 2.5 qday; toprol XL 25 qday; pradaxa 150 bid; bumex 1 qday; KCL 10. ICD check 2 weeks; OV with me in 1 month. OK for rehab from cardiac standpoint. 
______________________________________________________________________________________________ 
@ OV 10/2/18: 
Ms Fredis Saeed has a h/o: 
1) CAD (LAD ISELA 2014 for NQMI), Neg PET for ischemia 9/2018. 
2) mixed ischemic/tachycardia mediated CM 4/2014 (EF 20%); EF 45% 11/2017. Alem Purple was too expensive. 3) HTN, 4) PAfib (RFA 4/2016 and 1/2017), Recurrrent/persistent afib 2017/2018 5) DM,  
6) SHAYLA (on CPAP),  
7) CKD (Cr 1.5 range). Aldactone stopped in 2014. 8) [facility] PPM 7/2014 for bradycardia while on therapy for AFib. 
 
11/2017: No CP/STEEN; Back in afib today:  Coreg changed Toprol-XL. 10/2018:  Recent ER visit with epigastric pain; negative evaluation there & seen by Dr. Contreras Shea who set up a cardiac PET. The PET shows no ischemia but EF suggested at 16%. Of note patient does have AFib with accelerated ventricular response. No recurrence of epigastric discomfort. No bleeding. Had a simple trip at home a few weeks ago without injury. IMPRESSION AND PLAN 
  
01. (I25.10) Atherosclerotic heart disease of native coronary artery without angina pectoris:  No anginal symptoms. Cont asa and stopped plavix with need for anticoagulation. We discussed the signs and symptoms of unstable angina, myocardial infarction and malignant arrhythmia. The patient knows to seek immediate medical attention should they occur. ECG done today 02. (I42.0) Dilated cardiomyopathy:  Mixed ischemic/non-ischemic (tachycardia mediated) CM. Her ejection fraction is greater than 35% after repeat echo. The patient's systolic function has been stable. I suspect the PET has a falsely low EF related to her AFib but repeat echo is needed. 2-D w/CFD Echo to be done first available. 03. (I50.42) Chronic combined systolic (congestive) and diastolic (congestive) heart failure:  Resolved exertional symptoms. (NYHA I)  The patient is compliant with their CHF regimen. 04. (I48.1) Persistent atrial fibrillation:  Management per Dr Zoya Ruiz; currently off amio and on Pradaxa. Heart rate has been to elevated; increase Toprol  q.day. Further AFib monitoring/therapy per Dr. Eugenia Patterson. 05. (I13.0) Hyp hrt & chr kdny dis w hrt fail and stg 1-4/unsp chr kdny:  Adequately controlled. We will see how the patient does with the med changes noted above. 06. (E78.2) Mixed hyperlipidemia:  Lipid labs drawn by PCP. The patient is tolerating lipid lowering therapy well. 07. (N18.3) Chronic kidney disease, stage 3 (moderate):  Cr 1.24/GFR46 11/2015. Cr 1.32/GFR 43 1/2017.  
08. (R60.0) Localized edema:  Resolved. She avoids salt. 09. (E11.69) Type 2 diabetes mellitus with other specified complication:  Lipids & HTN as noted above; DM managed by other MD.  
10. (Z95.0) Presence of cardiac pacemaker:  will continue to be followed in device clinic. 11. (Z79.82) Long term (current) use of aspirin:  This condition is stable. 12. (Z79.01) Long term (current) use of anticoagulants: This condition is stable. Indicated for atrial fibrillation. No bleeding. If she has recurrent falls, she knows to call for re-evaluation of anticoagulation. 13. (Z68.38) Body mass index (BMI) 38.0-38.9, adult:  The patient was instructed on AHA diet and regular exercise. ORDERS: 
    
1 ECG done today 2 2-D w/ CFD Echocardiogram first available. 3 Return office visit with Willy Little MD in 6 Months. 4 The patient was instructed on AHA diet and regular exercise. 10/2/18 MEDICATION LIST Medication Sig Description amlodipine 5 mg tablet take 1 tablet by oral route  every day per pc  
aspirin 81 mg tablet,delayed release take 1 tablet by oral route  every day  
atorvastatin 40 mg tablet take 1 tablet by oral route  every evening  
bumetanide 1 mg tablet 1 in am on tues&thursday 2 tablets on mon wed fridays Calcium 600 600 mg (1,500 mg) tablet daily  
clonazepam 0.5 mg tablet take 1 tablet by oral route  every day LISINOPRIL 20 MG Tablet TAKE 1 TABLET EVERY DAY Lyrica 200 mg capsule take 1 capsule by oral route 2 times every day  
magnesium 250 mg tablet take 1 tablet by oral route  every day  
metoprolol succinate  mg tablet,extended release 24 hr take 1 tablet by oral route  every day  
potassium gluconate 500 mg (83 mg) tablet daily Pradaxa 150 mg capsule take 1 capsule by oral route 2 times every day Premarin 0.625 mg/gram vaginal cream insert 0.5 applicatorful by vaginal route  every day cyclically, 3 weeks on and 1 week off  
venlafaxine 75 mg tablet 2 po bid Vitamin D3 1,000 unit capsule take 1 daily  
 
____________________________________________________________________________________________________ CARDIAC HISTORY 
CAD: 
   
1 NSTEMI [95% Proximal LAD, Resolute (ISELA) to the Proximal LAD.] - 4/8/2014 CHF/CM: 
   
1 Mixed ischemic/tachycardic CM. [Nl TSH.] - 4/2014  
2 Ischemic & tachycardic Cardiomyopathy [Echo (EF 0.45) - 06/17/2014, (prev EF 20-30%)] - 6/2014 ARRHYTHMIA: 
   
1 A Fib [DCCV, Plan: amio started; Eliquis with Effient (change eliquis to asa if NSR after 30 days). ] - 4/8/2014  
2 PAF - 8/2010  
3 A Fib, recurrent; and 6/2014. [Had marked sinus bradycardia while on bblocker/dig.] - 5/2014  
4 Sinus Bradycardia. - 6/2/2014  
5 PPM (Devices (Dual Chamber PPM (Regine Blunt)) - 7/17/2014) - 7/17/2014  
6 PAF [CVR; Eliquis restarted (plavix stopped). ] - 9/2015  
7 A Fib [RFA] - 4/2016  
8 A Fib [RFA] - 1/2017 RISK FACTORS: 
   
1 Diabetes [Diagnosed in 2014] 2 Hypertension 3 Dyslipidemia CARDIOVASCULAR PROCEDURES Procedure Date Results Cardiac PET 09/24/2018 EF 0.16 (16%), physiologic apical thinning with no ischemia Cath 04/08/2014 95% Proximal LAD, Resolute (ISELA) to the Proximal LAD. DCCV 04/08/2014 Initial Rhythm A Fib, Final Rhythm Sinus Devices 07/17/2014 Dual Chamber PPM ([place]) Echo 11/28/2017 EF 0.45 (45%), Mild Global Hypo, Mild TR, Mild LVH, Mild MR, Est RVSP 41 mmHg, Normal RV. (probable trileaflet AoV). Echo 10/30/2015 EF 0.45 (45%), Mild LVH, LA Diam 4.1 cm, Est RVSP 35 mmHg, Normal RV. ?bicuspid AoV; (Prob trileaflet on previous ANGELITO). Echo 06/17/2014 EF 0.45 (45%), EF range 45%-50%, Mild LV dilatation. Mild LV systolic dysfunction. ? Bicuspid AoV but prob trileafet on previous ANGELITO. Echo 04/03/2014 EF 0.20 (20%), global HK with lateral sparing; no valve disease. EKG 10/02/2018 Atrial Fib, ; nonspecific T-wave flattening. Holter 06/09/2014 No Malignant Arrhythmias, Atrial Fib, No correlation with symptoms, (, ave [address].) Holter 04/30/2014 No Malignant Arrhythmias, Atrial Fib, No correlation with symptoms, \"light or heavy chest\" = afib in 60s. HR  (ave 63). ASx pauses (upto 2.8) while asleep. RFA 01/19/2017 Indication A Fib, Final Rhythm Sinus RFA  Indication A Fib Sleep Study  OSAS: Moderate ANGELITO 04/08/2014 EF 0.35 (35%), No BRAYDON Clot, prob trileaflet AoV. ACTIVE ALLERGIES: 
Ingredient Reaction Comment SACUBITRIL Wyona Jimenez is too expensive SULFA (SULFONAMIDE ANTIBIOTICS) AMOXICILLIN TRIHYDRATE    
POTASSIUM CLAVULANATE ACTIVE INTOLERANCES: 
Description Reaction Comment SACUBITRIL Wyona Jimenez is too expensive PROBLEM LIST: 
Problem Description Chronic Benign essential HTN Y  
DM type 2 N A-fib Y  
CAD Y Mixed hyperlipidemia Y  
 
 
PAST MEDICAL/SURGICAL HISTORY  (Detailed) Disease/disorder Onset Date Management Date Comments  
  hysterectomy A-FIB      
 Cardiovascular disease CHF Hyperlipidemia Hypertension SHAYLA: moderate. 2014 Family History  (Detailed) Relationship Family Member Name  Age at Death Condition Onset Age Cause of Death Brother    Hypertension  N Mother    PAD  N Mother    Hypertension  N Mother    Cardiac arrhythmias  N Sister    Diabetes mellitus  N  
 
SOCIAL HISTORY  (Detailed) Preferred language is [place]. EDUCATION/EMPLOYMENT/OCCUPATION Employment History Status Retired Restrictions  
  retired    
  retired MARITAL STATUS/FAMILY/SOCIAL SUPPORT Currently . For other plans, see orders. Hospital problem list  
Active Hospital Problems Diagnosis Date Noted  AYLIN (acute kidney injury) ([address] 10/13/2018 Suspect prerenal 
  
 Atrial fibrillation with rapid ventricular response ([address] 10/07/2018  Hypokalemia 10/07/2018  Acute respiratory failure with hypoxemia ([address] 10/06/2018  Type 2 diabetes mellitus with diabetic neuropathy, without long-term current use of insulin ([address] 2016  Acute systolic heart failure ([address] 2014 Subjective: Velma Dickens reports Chest pain X none  consistent with:  Non-cardiac CP Atypical CP None now  On going  Anginal CP Dyspnea  none  at rest  with exertion    
   x improved  unchanged  worse PND X none  overnight Orthopnea X none  improved  unchanged  worse Presyncope X none  improved  unchanged  worse Ambulated in hallway without symptoms   Yes Ambulated in room without symptoms  Yes Objective:  
 Physical Exam: 
Overall VSSAF;   
Visit Vitals /85 (BP 1 Location: Left arm, BP Patient Position: Sitting) Comment (BP Patient Position): feet on floor Pulse 80 Temp 97.6 °F (36.4 °C) Resp 18 Ht 5' 9\" (1.753 m) Wt 104.9 kg (231 lb 4.2 oz) SpO2 100% Breastfeeding? No  
BMI 34.15 kg/m² Temp (24hrs), Av °F (36.7 °C), Min:97.6 °F (36.4 °C), Max:98.9 °F (37.2 °C) Patient Vitals for the past 8 hrs: 
 Pulse 10/20/18 0736 80 Patient Vitals for the past 8 hrs: 
 Resp  
10/20/18 0736 18 Patient Vitals for the past 8 hrs: 
 BP  
10/20/18 0736 119/85 Intake/Output Summary (Last 24 hours) at 10/20/2018 8430 Last data filed at 10/19/2018 1501 Gross per 24 hour Intake  Output 400 ml Net -400 ml General Appearance: Well developed, well nourished, no acute distress. Ears/Nose/Mouth/Throat:   Normal MM; anicteric. JVP: WNL Resp:   clear to auscultation bilaterally. Nl resp effort. Cardiovascular:  IRRR, S1, S2 normal, no new murmur. No gallop or rub. Abdomen:   Soft, non-tender, bowel sounds are present. Extremities: Min edema bilaterally. Skin: 
Neuro: Warm and dry. A/O x3, grossly nonfocal  
cath site intact w/o hematoma or new bruit; distal pulse unchanged  Yes Data Review:    
Telemetry independently reviewed  sinus x chronic afib  parox afib  NSVT  
 
ECG independently reviewed  NSR  afib  no significant changes  NSST-Tw chgs  
x no new ECG provided for review Lab results reviewed as noted below. Current medications reviewed as noted below. No results for input(s): PH, PCO2, PO2 in the last 72 hours. No results for input(s): CPK, CKMB, CKNDX, TROIQ in the last 72 hours. Recent Labs 10/20/18 
3999 10/19/18 
8999 10/18/18 
7649  146* 144  
K 4.5 4.1 4.0  
* 112* 110* CO2 28 28 27 BUN 21* 24* 30* CREA 1.32* 1.38* 1.45* GFRAA 49* 46* 44* GLU 73 95 89  
CA 8.3* 8.0* 8.5 WBC 7.0 6.4  --   
HGB 10.6* 11.0*  --   
HCT 34.5* 35.0  --   
* 129*  --   
 
Lab Results Component Value Date/Time  Cholesterol, total 104 2018 10:17 AM  
 HDL Cholesterol 33 (L) 2018 10:17 AM  
 LDL, calculated 49 2018 10:17 AM  
 Triglyceride 110 2018 10:17 AM  
 CHOL/HDL Ratio 4.7 04/03/2014 03:30 PM  
 
No results for input(s): SGOT, GPT, AP, TBIL, TP, ALB, GLOB, GGT, AML, LPSE in the last 72 hours. No lab exists for component: AMYP, HLPSE No results for input(s): INR, PTP, APTT in the last 72 hours. No lab exists for component: INREXT, INREXT No components found for: [place] Current Facility-Administered Medications Medication Dose Route Frequency  sodium chloride (NS) flush 5-10 mL  5-10 mL IntraVENous Q8H  
 sodium chloride (NS) flush 5-10 mL  5-10 mL IntraVENous PRN  
 metoprolol succinate (TOPROL-XL) XL tablet 25 mg  25 mg Oral DAILY  dabigatran etexilate (PRADAXA) capsule 150 mg  150 mg Oral BID  vancomycin (VANCOCIN) 1,000 mg injection  sodium chloride (NS) flush 5-10 mL  5-10 mL IntraVENous Q8H  
 sodium chloride (NS) flush 5-10 mL  5-10 mL IntraVENous PRN  
 lactobac ac& pc-s.therm-b.anim (JONNIE Q/RISAQUAD)  1 Cap Oral DAILY  lisinopril (PRINIVIL, ZESTRIL) tablet 2.5 mg  2.5 mg Oral DAILY  bumetanide (BUMEX) tablet 1 mg  1 mg Oral DAILY  potassium chloride SR (KLOR-CON 10) tablet 10 mEq  10 mEq Oral DAILY  conjugated estrogens (PREMARIN) 0.625 mg/gram vaginal cream 0.5 g  0.5 g Vaginal EVERY TU & FRI  insulin lispro (HUMALOG) injection   SubCUTAneous AC&HS  
 glucose chewable tablet 16 g  4 Tab Oral PRN  
 dextrose (D50W) injection syrg 12.5-25 g  12.5-25 g IntraVENous PRN  
 glucagon (GLUCAGEN) injection 1 mg  1 mg IntraMUSCular PRN  
 venlafaxine-SR (EFFEXOR-XR) capsule 150 mg  150 mg Oral BID  magnesium oxide (MAG-OX) tablet 400 mg  400 mg Oral DAILY  pregabalin (LYRICA) capsule 200 mg  200 mg Oral BID  aspirin chewable tablet 81 mg  81 mg Oral DAILY  atorvastatin (LIPITOR) tablet 40 mg  40 mg Oral QHS  sodium chloride (NS) flush 5-10 mL  5-10 mL IntraVENous Q8H  
 sodium chloride (NS) flush 5-10 mL  5-10 mL IntraVENous PRN  
 acetaminophen (TYLENOL) tablet 650 mg  650 mg Oral Q6H PRN  
  docusate sodium (COLACE) capsule 100 mg  100 mg Oral DAILY PRN Rudy Nagy MD

## 2018-10-20 NOTE — PROCEDURES
[address]  [phone]    Patient ID:  Patient: Aline Lucia  MRN: [id number]  Age: 79 y.o.  : 1950  Gender: female  Study Date: 10/19/2018    History:  This is a female with atrial fibrillation with rapid ventricular response and heart failure unable to rate control with medication, here for ablation of AV node function.     Procedures Performed:   1. EP study with ablation of AV node function (11836)      The patient was brought to EP lab in NPO state and after informed consent.  Continuous ECG and hemodynamic monitoring was performed.  Sedation was by the EP nurse who was in constant attendance and supervision throughout the procedure using Versed and fentanyl, sedation time 23 minutes.  Right groin was anesthetized with 1% lidocaine and access obtained (1 safe sheath in the right femoral vein).     An 8Fr sheath on the right was exchanged to SR-0 long support sheath.  An 8 Fr 8 mm tip deflectable mapping and ablation catheter was used.     At the end of the study, hemostasis was obtained after the sheath was removed, no complication.     Preoperative Diagnosis: As above. Postoperative Diagnosis: As above. Procedure:  As above. Surgeon(s) and Role:  Mark Pérez MD - Primary   Anesthesia:   MAC. Estimated Blood Loss:  <5 cc. Specimens: * No specimens in log *   Findings:  As below. Complications:  None.     Ablation time:  8 applications of 7.7 minutes total  X-ray:  7.1 minutes     Findings:  1.  At baseline, atrial fibrillation was present.  ( and  ms).  The HV interval was 53 msec. 2.  Antegrade conduction was without preexcitation. 3.  Retrograde conduction could not be tested due to atrial fibrillation. 4.  Using fluoroscopy and electrogram mapping to facilitate precision of ablation therapy, the mapping and ablation catheter was used to ablate in region of a His deflection.   During therapy, JET was seen and right bundle branch developed. The catheter was repositioned several times until the spot where complete heart block developed was found during therapy. This was more posterior than expected. 5.  At the end of the case, complete heart block was present. Normal BIV-ICD function was observed before, during, and after the case.     Impression:  1.  Successful ablation of AV node function. 2.  Continued atrial fibrillation.   3.  Normal [facility] BIV-ICD function was observed.     RECS:  The base rate of her device was reprogrammed to 80 bpm.       Signed By: Sergei Franks MD     October 19, 2018

## 2018-10-20 NOTE — DISCHARGE INSTRUCTIONS
DISCHARGE INSTRUCTIONS FOR PATIENTS WITH BIV-ICD'S    You had a BIV-ICD upgrade to your prior pacemaker on 10/18 due to a weak heart, followed by an AV node ablation on 10/19 to definitively control the heart rate during atrial fibrillation. Both of these procedures were performed by Dr. Jose Elias Rodriguez. 1. Remember to call for an appointment in 2-4 weeks [phone] to check healing and implant programming with Dr. Milan Peterson nurse, . After this, follow-up with Dr. Francy Metzger, your cardiologist at his requested time. 2. Medic Alert Bracelets are available from your pharmacist to wear at all times if you choose to wear one. 3. Carry your ID card for your ICD with you at all times. This card will be given to you in the hospital or mailed to you. 4. The ICD will bulge slightly under your skin. The bulge will decrease in size over the next few weeks. Please notify the doctor's office if you notice any of the following around your ICD site:   A.  A bruise that does not go away. B.  Soreness or yellow, green, or brown drainage from the site. C. Any swelling from the site. D. If you have a fever of 100 degrees or higher that lasts for a few days. INCISION CARE       1.  Leave the skin glue over your site until it dissolves on its own, usually in a few weeks. 2.  You may shower after 3 days as long as your incision isnt submerged or directly sprayed upon until well healed. 3.  For comfort, wear loose fitting clothing. 4.  Report any signs of infection, fever, pain, swelling, redness, oozing, or heat at site especially if these symptoms increase after the first 3 to 4 days. ACTIVITY PRECAUTIONS     1. Avoid rough contact with the implant site. 2. No driving for 14 days. 3. Avoid lifting your arm over your head, carrying anything on the affected side, or lifting over 10 pounds for 30 days. For the first 2 days only bend your arm at the elbow.   4. Any extreme activity such as golf, weight lifting or exercise biking should be restricted for 60 days. 5. Do not carry objects by holding them against your implant site. 6.  No shooting rifles or any type of gun with the affected shoulder permanently. 7.  Welding and chainsaws are prohibited. SPECIAL PRECAUTIONS     1. You should avoid all strong magnetic fields, such as arc welding, large transformers, large motors. Some ICD devices will beep if it detects a strong magnet. If this occurs, move out of the area. 2.  You may not have an MRI which uses a strong magnet to take pictures unless given the OK by a cardiologist.  3.  Treatments or surgery that requires diathermy or electrocautery should be discussed with your doctor before scheduled. 4.  Avoid radio frequency transmitters, including radar. 5.  Advise dentist or other medical personnel you see that you have an ICD. 6.  Cell phones and microwave oven use is okay. 7.  If you plan to move or take a trip to a new area, the doctor's office will give you a name of a doctor to contact for any problems. SPECIAL INSTRUCTIONS ON SHOCKS     1. Notify your doctor for any of the following:       A. Anytime a shock is received in a 24 hour period. An office visit is not usually required for a single shock. B.  Two or more shocks in a row. If you do not feel well, call the [facility], otherwise call your doctor. This may require an office visit. C. Two or more shocks spaced apart by several hours. This may require an office visit. 2.  Keep a record of events. Include date, time, symptoms and activity at that time. ANTIBIOTIC THERAPY    During the first 8 weeks after your ICD insertion, you may need antibiotics before any dental work or certain tests or operations. Let the dentist or doctor who is caring for you know that you have had an implanted device.

## 2018-10-21 LAB
ANION GAP SERPL CALC-SCNC: 4 MMOL/L (ref 5–15)
BUN SERPL-MCNC: 22 MG/DL (ref 6–20)
BUN/CREAT SERPL: 16 (ref 12–20)
CALCIUM SERPL-MCNC: 8 MG/DL (ref 8.5–10.1)
CHLORIDE SERPL-SCNC: 113 MMOL/L (ref 97–108)
CO2 SERPL-SCNC: 27 MMOL/L (ref 21–32)
CREAT SERPL-MCNC: 1.4 MG/DL (ref 0.55–1.02)
GLUCOSE BLD STRIP.AUTO-MCNC: 101 MG/DL (ref 65–100)
GLUCOSE BLD STRIP.AUTO-MCNC: 112 MG/DL (ref 65–100)
GLUCOSE BLD STRIP.AUTO-MCNC: 130 MG/DL (ref 65–100)
GLUCOSE BLD STRIP.AUTO-MCNC: 82 MG/DL (ref 65–100)
GLUCOSE SERPL-MCNC: 78 MG/DL (ref 65–100)
POTASSIUM SERPL-SCNC: 3.9 MMOL/L (ref 3.5–5.1)
SERVICE CMNT-IMP: ABNORMAL
SERVICE CMNT-IMP: NORMAL
SODIUM SERPL-SCNC: 144 MMOL/L (ref 136–145)

## 2018-10-21 PROCEDURE — 82962 GLUCOSE BLOOD TEST: CPT

## 2018-10-21 PROCEDURE — 74011250637 HC RX REV CODE- 250/637: Performed by: INTERNAL MEDICINE

## 2018-10-21 PROCEDURE — 94760 N-INVAS EAR/PLS OXIMETRY 1: CPT

## 2018-10-21 PROCEDURE — 36415 COLL VENOUS BLD VENIPUNCTURE: CPT | Performed by: INTERNAL MEDICINE

## 2018-10-21 PROCEDURE — 65660000000 HC RM CCU STEPDOWN

## 2018-10-21 PROCEDURE — 80048 BASIC METABOLIC PNL TOTAL CA: CPT | Performed by: INTERNAL MEDICINE

## 2018-10-21 RX ORDER — CLONAZEPAM 0.5 MG/1
0.5 TABLET ORAL
Status: DISCONTINUED | OUTPATIENT
Start: 2018-10-21 | End: 2018-10-22 | Stop reason: HOSPADM

## 2018-10-21 RX ADMIN — Medication 10 ML: at 05:18

## 2018-10-21 RX ADMIN — VENLAFAXINE HYDROCHLORIDE 150 MG: 150 CAPSULE, EXTENDED RELEASE ORAL at 17:31

## 2018-10-21 RX ADMIN — Medication 10 ML: at 22:31

## 2018-10-21 RX ADMIN — Medication 10 ML: at 17:34

## 2018-10-21 RX ADMIN — ATORVASTATIN CALCIUM 40 MG: 40 TABLET, FILM COATED ORAL at 22:32

## 2018-10-21 RX ADMIN — ASPIRIN 81 MG CHEWABLE TABLET 81 MG: 81 TABLET CHEWABLE at 08:22

## 2018-10-21 RX ADMIN — PREGABALIN 200 MG: 100 CAPSULE ORAL at 17:31

## 2018-10-21 RX ADMIN — ACETAMINOPHEN 650 MG: 325 TABLET ORAL at 11:13

## 2018-10-21 RX ADMIN — VENLAFAXINE HYDROCHLORIDE 150 MG: 150 CAPSULE, EXTENDED RELEASE ORAL at 08:22

## 2018-10-21 RX ADMIN — PREGABALIN 200 MG: 100 CAPSULE ORAL at 08:22

## 2018-10-21 RX ADMIN — ACETAMINOPHEN 650 MG: 325 TABLET ORAL at 17:30

## 2018-10-21 RX ADMIN — Medication 1 CAPSULE: at 08:22

## 2018-10-21 RX ADMIN — CLONAZEPAM 0.5 MG: 0.5 TABLET ORAL at 22:31

## 2018-10-21 RX ADMIN — BUMETANIDE 1 MG: 1 TABLET ORAL at 08:22

## 2018-10-21 RX ADMIN — Medication 400 MG: at 08:22

## 2018-10-21 RX ADMIN — ACETAMINOPHEN 650 MG: 325 TABLET ORAL at 05:17

## 2018-10-21 RX ADMIN — Medication 10 ML: at 17:35

## 2018-10-21 RX ADMIN — METOPROLOL SUCCINATE 25 MG: 25 TABLET, EXTENDED RELEASE ORAL at 08:22

## 2018-10-21 RX ADMIN — DABIGATRAN ETEXILATE MESYLATE 150 MG: 150 CAPSULE ORAL at 17:35

## 2018-10-21 RX ADMIN — POTASSIUM CHLORIDE 10 MEQ: 750 TABLET, FILM COATED, EXTENDED RELEASE ORAL at 08:22

## 2018-10-21 RX ADMIN — LISINOPRIL 2.5 MG: 5 TABLET ORAL at 17:31

## 2018-10-21 RX ADMIN — DABIGATRAN ETEXILATE MESYLATE 150 MG: 150 CAPSULE ORAL at 08:22

## 2018-10-21 NOTE — PROGRESS NOTES
Bedside shift change report given to [address] [place] [address]  (oncoming nurse) by me (offgoing nurse). Report given with SBAR, MAR and Recent Results.

## 2018-10-21 NOTE — PROGRESS NOTES
PROGRESS NOTE 
 
NAME:  Cinthia Felty :   1950 MRN:   [id number] Date/Time:  10/21/2018 8:54 AM 
Subjective:  
History: up in room. hemodynamically stable. Medications reviewed: 
Current Facility-Administered Medications Medication Dose Route Frequency  sodium chloride (NS) flush 5-10 mL  5-10 mL IntraVENous Q8H  
 sodium chloride (NS) flush 5-10 mL  5-10 mL IntraVENous PRN  
 metoprolol succinate (TOPROL-XL) XL tablet 25 mg  25 mg Oral DAILY  dabigatran etexilate (PRADAXA) capsule 150 mg  150 mg Oral BID  vancomycin (VANCOCIN) 1,000 mg injection  sodium chloride (NS) flush 5-10 mL  5-10 mL IntraVENous Q8H  
 sodium chloride (NS) flush 5-10 mL  5-10 mL IntraVENous PRN  
 lactobac ac& pc-s.therm-b.anim (JONNIE Q/RISAQUAD)  1 Cap Oral DAILY  lisinopril (PRINIVIL, ZESTRIL) tablet 2.5 mg  2.5 mg Oral DAILY  bumetanide (BUMEX) tablet 1 mg  1 mg Oral DAILY  potassium chloride SR (KLOR-CON 10) tablet 10 mEq  10 mEq Oral DAILY  conjugated estrogens (PREMARIN) 0.625 mg/gram vaginal cream 0.5 g  0.5 g Vaginal EVERY  & FRI  insulin lispro (HUMALOG) injection   SubCUTAneous AC&HS  
 glucose chewable tablet 16 g  4 Tab Oral PRN  
 dextrose (D50W) injection syrg 12.5-25 g  12.5-25 g IntraVENous PRN  
 glucagon (GLUCAGEN) injection 1 mg  1 mg IntraMUSCular PRN  
 venlafaxine-SR (EFFEXOR-XR) capsule 150 mg  150 mg Oral BID  magnesium oxide (MAG-OX) tablet 400 mg  400 mg Oral DAILY  pregabalin (LYRICA) capsule 200 mg  200 mg Oral BID  aspirin chewable tablet 81 mg  81 mg Oral DAILY  atorvastatin (LIPITOR) tablet 40 mg  40 mg Oral QHS  sodium chloride (NS) flush 5-10 mL  5-10 mL IntraVENous Q8H  
 sodium chloride (NS) flush 5-10 mL  5-10 mL IntraVENous PRN  
 acetaminophen (TYLENOL) tablet 650 mg  650 mg Oral Q6H PRN  
 docusate sodium (COLACE) capsule 100 mg  100 mg Oral DAILY PRN Objective:  
Vitals: 
Visit Vitals /82 Pulse 80  
 Temp 97.7 °F (36.5 °C) Resp 19 Ht 5' 9\" (1.753 m) Wt 231 lb 4.2 oz (104.9 kg) SpO2 98% Breastfeeding? No  
BMI 34.15 kg/m² O2 Flow Rate (L/min): 2 l/min O2 Device: Room air Temp (24hrs), Av.7 °F (36.5 °C), Min:97.4 °F (36.3 °C), Max:98 °F (36.7 °C) Last 24hr Input/Output: 
 
Intake/Output Summary (Last 24 hours) at 10/21/2018 6447 Last data filed at 10/20/2018 1216 Gross per 24 hour Intake 480 ml Output  Net 480 ml PHYSICAL EXAM: 
General:     Alert, cooperative, no distress, appears stated age. Head:    Normocephalic, without obvious abnormality, atraumatic. Eyes:    Conjunctivae/corneas clear. PERRLA Nose:   Nares normal. No drainage or sinus tenderness. Throat:     Lips, mucosa, and tongue normal.  No Thrush Neck:   Supple, symmetrical,  no adenopathy, thyroid: non tender 
   no carotid bruit and no JVD. Back:     Symmetric,  No CVA tenderness. Lungs:    Clear to auscultation bilaterally. No Wheezing or Rhonchi. No rales. Heart:    Regular rate and rhythm,  no murmur, rub or gallop. Abdomen:    Soft, non-tender. Not distended. Bowel sounds normal. No masses Extremities:  Extremities normal, atraumatic, No cyanosis. No edema. No clubbing Lymph nodes:  Cervical, supraclavicular normal. 
Neurologic:  Normal strength, Alert and oriented X 3. Skin:                No rash Lab Data Reviewed: 
 
Recent Results (from the past 24 hour(s)) GLUCOSE, POC Collection Time: 10/20/18 11:27 AM  
Result Value Ref Range Glucose (POC) 120 (H) 65 - 100 mg/dL Performed by [employer] GLUCOSE, POC Collection Time: 10/20/18  5:01 PM  
Result Value Ref Range Glucose (POC) 101 (H) 65 - 100 mg/dL Performed by Jane Julio GLUCOSE, POC Collection Time: 10/20/18  9:17 PM  
Result Value Ref Range Glucose (POC) 125 (H) 65 - 100 mg/dL Performed by Sameera Bhandari METABOLIC PANEL, BASIC  Collection Time: 10/21/18  3:21 AM  
 Result Value Ref Range Sodium 144 136 - 145 mmol/L Potassium 3.9 3.5 - 5.1 mmol/L Chloride 113 (H) 97 - 108 mmol/L  
 CO2 27 21 - 32 mmol/L Anion gap 4 (L) 5 - 15 mmol/L Glucose 78 65 - 100 mg/dL BUN 22 (H) 6 - 20 MG/DL Creatinine 1.40 (H) 0.55 - 1.02 MG/DL  
 BUN/Creatinine ratio 16 12 - 20 GFR est AA 46 (L) >60 ml/min/1.73m2 GFR est non-AA 38 (L) >60 ml/min/1.73m2 Calcium 8.0 (L) 8.5 - 10.1 MG/DL  
GLUCOSE, POC Collection Time: 10/21/18  7:17 AM  
Result Value Ref Range Glucose (POC) 82 65 - 100 mg/dL Performed by Carter Cazares Assessment/Plan:  
 
Principal Problem: 
  Acute respiratory failure with hypoxemia ([address] (10/6/2018) Active Problems: 
  Acute systolic heart failure ([address] (4/4/2014) Type 2 diabetes mellitus with diabetic neuropathy, without long-term current use of insulin ([address] (6/5/2016) Atrial fibrillation with rapid ventricular response ([address] (10/7/2018) Hypokalemia (10/7/2018) AYLIN (acute kidney injury) ([address] (10/13/2018) Overview: Suspect prerenal 
 
  
___________________________________________________ PLAN: 
 
1. Continue current management 2. Transfer to rehab in am. 
3.  : 
4. 
 
 
 
 
 
___________________________________________________ Attending Physician: Jose Vazquez MD

## 2018-10-21 NOTE — PROGRESS NOTES
Cardiology Progress Note 
10/21/2018     Admit Date: 10/6/2018 Admit Diagnosis: Acute respiratory failure (HCC)  CC: none currently Cardiac Assessment/Plan:  
Ms Junaid Burton has a h/o: 
1) CAD (LAD ISELA 2014 for NQMI), Neg PET for ischemia 9/2018. 
2) mixed ischemic/tachycardia mediated CM 4/2014 (EF 20%); EF 45% 11/2017. Irene Fail was too expensive. 3) HTN, 4) PAfib (RFA 4/2016 and 1/2017), Recurrrent/persistent afib 2017/2018 5) DM,  
6) SHAYLA (on CPAP),  
7) CKD (Cr 1.5 range). Aldactone stopped in 2014. 8) [facility] Don PPM 7/2014 for bradycardia while on therapy for AFib. CAD: LAD ISELA 2014; recent PET w/o ischemia but recurrent low EF: if not improved with med Rx, may need repeat cath. CM: Echo 10/9/18: EF 15-20%. Has diuresed well; on PO Bumex 1 qday. Tolerating BBlocker @ lower dose than previous; adding back low-dose ACEi (lisinopril 2.5 qday) HTN: off metoprolol/lisinopril with low BPs after ETT/sedation: Levophed off since evening of 10/7; change to prn Jose: restarted BBlocker (toprol XL 50 qday now). Rhythm: PAFib; recurrent; remains on PO amio; Dr Jarad Davis rec: stablized CHF then considering AV node ablation/BiV PM or ICD. Cont anticoagulation (lovenox for now; PO after ablation plan/BiV upgrade decided). Renal function: Cr down to 1.7 from 2; (Cr 1.3 range typically). Resp failure, SHAYLA, DM, Dispo: per primary team.  
For other plans, see orders. 10/14: On Room air, cr still at 2, bumex PO restarted, amiodarone decreaesed from TID to bid, HR improved. 10/15: No orthopnea; much less STEEN; fell in bathroom yesterday (legs weak/poor balance): will need some rehab. No CP; PO bumex 1 qday; changed metoprolol to XL 50 qday; added lisinopril 2.5; cont amio PO bid. 10/17: Stable cardiac status; Cr stable at 1.5; Tolerating meds; EP plan per Dr Jarad Davis. 10/21: Stable cardiac status; BiV device in 10/18; AV node abation 10/19. On lower dose bblocker after procedure. BPs stable. Cr stable; appropriate vol status; Back on anticoag after procedure. To be off amio; D/C cardiac meds: lisinopril 2.5 qday; toprol XL 25 qday; pradaxa 150 bid; bumex 1 qday; KCL 10. ICD check 2 weeks; OV with me in 1 month. OK for rehab from cardiac standpoint. 
______________________________________________________________________________________________ 
@ OV 10/2/18: 
Ms Faizan Terry has a h/o: 
1) CAD (LAD ISELA 2014 for NQMI), Neg PET for ischemia 9/2018. 
2) mixed ischemic/tachycardia mediated CM 4/2014 (EF 20%); EF 45% 11/2017. Brandy Six was too expensive. 3) HTN, 4) PAfib (RFA 4/2016 and 1/2017), Recurrrent/persistent afib 2017/2018 5) DM,  
6) SHAYLA (on CPAP),  
7) CKD (Cr 1.5 range). Aldactone stopped in 2014. 8) St Don PPM 7/2014 for bradycardia while on therapy for AFib. 
 
11/2017: No CP/STEEN; Back in afib today:  Coreg changed Toprol-XL. 10/2018:  Recent ER visit with epigastric pain; negative evaluation there & seen by Dr. Aevry Pack who set up a cardiac PET. The PET shows no ischemia but EF suggested at 16%. Of note patient does have AFib with accelerated ventricular response. No recurrence of epigastric discomfort. No bleeding. Had a simple trip at home a few weeks ago without injury. IMPRESSION AND PLAN 
  
01. (I25.10) Atherosclerotic heart disease of native coronary artery without angina pectoris:  No anginal symptoms. Cont asa and stopped plavix with need for anticoagulation. We discussed the signs and symptoms of unstable angina, myocardial infarction and malignant arrhythmia. The patient knows to seek immediate medical attention should they occur. ECG done today 02. (I42.0) Dilated cardiomyopathy:  Mixed ischemic/non-ischemic (tachycardia mediated) CM. Her ejection fraction is greater than 35% after repeat echo. The patient's systolic function has been stable. I suspect the PET has a falsely low EF related to her AFib but repeat echo is needed. 2-D w/CFD Echo to be done first available. 03. (I50.42) Chronic combined systolic (congestive) and diastolic (congestive) heart failure:  Resolved exertional symptoms. (NYHA I)  The patient is compliant with their CHF regimen. 04. (I48.1) Persistent atrial fibrillation:  Management per Dr Radha Cerda; currently off amio and on Pradaxa. Heart rate has been to elevated; increase Toprol  q.day. Further AFib monitoring/therapy per Dr. Saray Christiansen. 05. (I13.0) Hyp hrt & chr kdny dis w hrt fail and stg 1-4/unsp chr kdny:  Adequately controlled. We will see how the patient does with the med changes noted above. 06. (E78.2) Mixed hyperlipidemia:  Lipid labs drawn by PCP. The patient is tolerating lipid lowering therapy well. 07. (N18.3) Chronic kidney disease, stage 3 (moderate):  Cr 1.24/GFR46 11/2015. Cr 1.32/GFR 43 1/2017.  
08. (R60.0) Localized edema:  Resolved. She avoids salt. 09. (E11.69) Type 2 diabetes mellitus with other specified complication:  Lipids & HTN as noted above; DM managed by other MD.  
10. (Z95.0) Presence of cardiac pacemaker:  will continue to be followed in device clinic. 11. (Z79.82) Long term (current) use of aspirin:  This condition is stable. 12. (Z79.01) Long term (current) use of anticoagulants: This condition is stable. Indicated for atrial fibrillation. No bleeding. If she has recurrent falls, she knows to call for re-evaluation of anticoagulation. 13. (Z68.38) Body mass index (BMI) 38.0-38.9, adult:  The patient was instructed on AHA diet and regular exercise. ORDERS: 
    
1 ECG done today 2 2-D w/ CFD Echocardiogram first available. 3 Return office visit with Donny Little MD in 6 Months. 4 The patient was instructed on AHA diet and regular exercise. 10/2/18 MEDICATION LIST Medication Sig Description amlodipine 5 mg tablet take 1 tablet by oral route  every day per pc  
aspirin 81 mg tablet,delayed release take 1 tablet by oral route  every day  
atorvastatin 40 mg tablet take 1 tablet by oral route  every evening  
bumetanide 1 mg tablet 1 in am on tues&thursday 2 tablets on mon wed fridays Calcium 600 600 mg (1,500 mg) tablet daily  
clonazepam 0.5 mg tablet take 1 tablet by oral route  every day LISINOPRIL 20 MG Tablet TAKE 1 TABLET EVERY DAY Lyrica 200 mg capsule take 1 capsule by oral route 2 times every day  
magnesium 250 mg tablet take 1 tablet by oral route  every day  
metoprolol succinate  mg tablet,extended release 24 hr take 1 tablet by oral route  every day  
potassium gluconate 500 mg (83 mg) tablet daily Pradaxa 150 mg capsule take 1 capsule by oral route 2 times every day Premarin 0.625 mg/gram vaginal cream insert 0.5 applicatorful by vaginal route  every day cyclically, 3 weeks on and 1 week off  
venlafaxine 75 mg tablet 2 po bid Vitamin D3 1,000 unit capsule take 1 daily  
 
____________________________________________________________________________________________________ CARDIAC HISTORY 
CAD: 
   
1 NSTEMI [95% Proximal LAD, Resolute (ISELA) to the Proximal LAD.] - 4/8/2014 CHF/CM: 
   
1 Mixed ischemic/tachycardic CM. [Nl TSH.] - 4/2014  
2 Ischemic & tachycardic Cardiomyopathy [Echo (EF 0.45) - 06/17/2014, (prev EF 20-30%)] - 6/2014 ARRHYTHMIA: 
   
1 A Fib [DCCV, Plan: amio started; Eliquis with Effient (change eliquis to asa if NSR after 30 days). ] - 4/8/2014  
2 PAF - 8/2010  
3 A Fib, recurrent; and 6/2014. [Had marked sinus bradycardia while on bblocker/dig.] - 5/2014  
4 Sinus Bradycardia. - 6/2/2014  
5 PPM (Devices (Dual Chamber PPM (Yana Lockwood)) - 7/17/2014) - 7/17/2014  
6 PAF [CVR; Eliquis restarted (plavix stopped). ] - 9/2015  
7 A Fib [RFA] - 4/2016  
8 A Fib [RFA] - 1/2017 RISK FACTORS: 
   
1 Diabetes [Diagnosed in 2014] 2 Hypertension 3 Dyslipidemia CARDIOVASCULAR PROCEDURES Procedure Date Results Cardiac PET 09/24/2018 EF 0.16 (16%), physiologic apical thinning with no ischemia Cath 04/08/2014 95% Proximal LAD, Resolute (ISELA) to the Proximal LAD. DCCV 04/08/2014 Initial Rhythm A Fib, Final Rhythm Sinus Devices 07/17/2014 Dual Chamber PPM (Beryle Bienenstock) Echo 11/28/2017 EF 0.45 (45%), Mild Global Hypo, Mild TR, Mild LVH, Mild MR, Est RVSP 41 mmHg, Normal RV. (probable trileaflet AoV). Echo 10/30/2015 EF 0.45 (45%), Mild LVH, LA Diam 4.1 cm, Est RVSP 35 mmHg, Normal RV. ?bicuspid AoV; (Prob trileaflet on previous ANGELITO). Echo 06/17/2014 EF 0.45 (45%), EF range 45%-50%, Mild LV dilatation. Mild LV systolic dysfunction. ? Bicuspid AoV but prob trileafet on previous ANGELITO. Echo 04/03/2014 EF 0.20 (20%), global HK with lateral sparing; no valve disease. EKG 10/02/2018 Atrial Fib, ; nonspecific T-wave flattening. Holter 06/09/2014 No Malignant Arrhythmias, Atrial Fib, No correlation with symptoms, (, [address] Holter 04/30/2014 No Malignant Arrhythmias, Atrial Fib, No correlation with symptoms, \"light or heavy chest\" = afib in 60s. HR  (ave 63). ASx pauses (upto 2.8) while asleep. RFA 01/19/2017 Indication A Fib, Final Rhythm Sinus RFA  Indication A Fib Sleep Study  OSAS: Moderate ANGELITO 04/08/2014 EF 0.35 (35%), No BRAYDON Clot, prob trileaflet AoV. ACTIVE ALLERGIES: 
Ingredient Reaction Comment SACUBITRIL [place] is too expensive SULFA (SULFONAMIDE ANTIBIOTICS) AMOXICILLIN TRIHYDRATE    
POTASSIUM CLAVULANATE ACTIVE INTOLERANCES: 
Description Reaction Comment SACUBITRIL [place] is too expensive PROBLEM LIST: 
Problem Description Chronic Benign essential HTN Y  
DM type 2 N A-fib Y  
CAD Y Mixed hyperlipidemia Y  
 
 
PAST MEDICAL/SURGICAL HISTORY  (Detailed) Disease/disorder Onset Date Management Date Comments  
  hysterectomy A-FIB      
 Cardiovascular disease CHF Hyperlipidemia Hypertension SHAYLA: moderate. 2014 Family History  (Detailed) Relationship Family Member Name  Age at Death Condition Onset Age Cause of Death Brother    Hypertension  N Mother    PAD  N Mother    Hypertension  N Mother    Cardiac arrhythmias  N Sister    Diabetes mellitus  N  
 
SOCIAL HISTORY  (Detailed) Preferred language is [place]. EDUCATION/EMPLOYMENT/OCCUPATION Employment History Status Retired Restrictions  
  retired    
  retired MARITAL STATUS/FAMILY/SOCIAL SUPPORT Currently . For other plans, see orders. Hospital problem list  
Active Hospital Problems Diagnosis Date Noted  AYLIN (acute kidney injury) ([address] 10/13/2018 Suspect prerenal 
  
 Atrial fibrillation with rapid ventricular response ([address] 10/07/2018  Hypokalemia 10/07/2018  Acute respiratory failure with hypoxemia ([address] 10/06/2018  Type 2 diabetes mellitus with diabetic neuropathy, without long-term current use of insulin ([address] 2016  Acute systolic heart failure ([address] 2014 Subjective: Candi Virgen reports Chest pain X none  consistent with:  Non-cardiac CP Atypical CP None now  On going  Anginal CP Dyspnea  none  at rest  with exertion    
   x improved  unchanged  worse PND X none  overnight Orthopnea X none  improved  unchanged  worse Presyncope X none  improved  unchanged  worse Ambulated in hallway without symptoms   Yes Ambulated in room without symptoms  Yes Objective:  
 Physical Exam: 
Overall VSSAF;   
Visit Vitals /82 Pulse 80 Temp 97.7 °F (36.5 °C) Resp 19 Ht 5' 9\" (1.753 m) Wt 104.9 kg (231 lb 4.2 oz) SpO2 98% Breastfeeding? No  
BMI 34.15 kg/m² Temp (24hrs), Av.7 °F (36.5 °C), Min:97.4 °F (36.3 °C), Max:98 °F (36.7 °C) Patient Vitals for the past 8 hrs: 
 Pulse 10/21/18 0723 80  
10/21/18 0320 80 Patient Vitals for the past 8 hrs: 
 Resp  
10/21/18 0723 19  
10/21/18 0320 18 Patient Vitals for the past 8 hrs: 
 BP  
10/21/18 0723 119/82  
10/21/18 032 132/86 Intake/Output Summary (Last 24 hours) at 10/21/2018 1035 Last data filed at 10/20/2018 1216 Gross per 24 hour Intake 240 ml Output  Net 240 ml General Appearance: Well developed, well nourished, no acute distress. Ears/Nose/Mouth/Throat:   Normal MM; anicteric. JVP: WNL Resp:   clear to auscultation bilaterally. Nl resp effort. Cardiovascular:  IRRR, S1, S2 normal, no new murmur. No gallop or rub. Abdomen:   Soft, non-tender, bowel sounds are present. Extremities: Min edema bilaterally. Skin: 
Neuro: Warm and dry. A/O x3, grossly nonfocal  
cath site intact w/o hematoma or new bruit; distal pulse unchanged  Yes Data Review:    
Telemetry independently reviewed  sinus x chronic afib  parox afib  NSVT  
 
ECG independently reviewed  NSR  afib  no significant changes  NSST-Tw chgs  
x no new ECG provided for review Lab results reviewed as noted below. Current medications reviewed as noted below. No results for input(s): PH, PCO2, PO2 in the last 72 hours. No results for input(s): CPK, CKMB, CKNDX, TROIQ in the last 72 hours. Recent Labs 10/21/18 
0321 10/20/18 
0344 10/19/18 
6637  144 146*  
K 3.9 4.5 4.1 * 111* 112* CO2 27 28 28 BUN 22* 21* 24* CREA 1.40* 1.32* 1.38* GFRAA 46* 49* 46* GLU 78 73 95  
CA 8.0* 8.3* 8.0* WBC  --  7.0 6.4 HGB  --  10.6* 11.0*  
HCT  --  34.5* 35.0 PLT  --  115* 129* Lab Results Component Value Date/Time  Cholesterol, total 104 2018 10:17 AM  
 HDL Cholesterol 33 (L) 2018 10:17 AM  
 LDL, calculated 49 2018 10:17 AM  
 Triglyceride 110 08/01/2018 10:17 AM  
 CHOL/HDL Ratio 4.7 04/03/2014 03:30 PM  
 
No results for input(s): SGOT, GPT, AP, TBIL, TP, ALB, GLOB, GGT, AML, LPSE in the last 72 hours. No lab exists for component: AMYP, HLPSE No results for input(s): INR, PTP, APTT in the last 72 hours. No lab exists for component: INREXT, INREXT No components found for: [place] Current Facility-Administered Medications Medication Dose Route Frequency  sodium chloride (NS) flush 5-10 mL  5-10 mL IntraVENous Q8H  
 sodium chloride (NS) flush 5-10 mL  5-10 mL IntraVENous PRN  
 metoprolol succinate (TOPROL-XL) XL tablet 25 mg  25 mg Oral DAILY  dabigatran etexilate (PRADAXA) capsule 150 mg  150 mg Oral BID  vancomycin (VANCOCIN) 1,000 mg injection  sodium chloride (NS) flush 5-10 mL  5-10 mL IntraVENous Q8H  
 sodium chloride (NS) flush 5-10 mL  5-10 mL IntraVENous PRN  
 lactobac ac& pc-s.therm-b.anim (JONNIE Q/RISAQUAD)  1 Cap Oral DAILY  lisinopril (PRINIVIL, ZESTRIL) tablet 2.5 mg  2.5 mg Oral DAILY  bumetanide (BUMEX) tablet 1 mg  1 mg Oral DAILY  potassium chloride SR (KLOR-CON 10) tablet 10 mEq  10 mEq Oral DAILY  conjugated estrogens (PREMARIN) 0.625 mg/gram vaginal cream 0.5 g  0.5 g Vaginal EVERY TU & FRI  insulin lispro (HUMALOG) injection   SubCUTAneous AC&HS  
 glucose chewable tablet 16 g  4 Tab Oral PRN  
 dextrose (D50W) injection syrg 12.5-25 g  12.5-25 g IntraVENous PRN  
 glucagon (GLUCAGEN) injection 1 mg  1 mg IntraMUSCular PRN  
 venlafaxine-SR (EFFEXOR-XR) capsule 150 mg  150 mg Oral BID  magnesium oxide (MAG-OX) tablet 400 mg  400 mg Oral DAILY  pregabalin (LYRICA) capsule 200 mg  200 mg Oral BID  aspirin chewable tablet 81 mg  81 mg Oral DAILY  atorvastatin (LIPITOR) tablet 40 mg  40 mg Oral QHS  sodium chloride (NS) flush 5-10 mL  5-10 mL IntraVENous Q8H  
 sodium chloride (NS) flush 5-10 mL  5-10 mL IntraVENous PRN  
  acetaminophen (TYLENOL) tablet 650 mg  650 mg Oral Q6H PRN  
 docusate sodium (COLACE) capsule 100 mg  100 mg Oral DAILY PRN Viktor Reyna MD

## 2018-10-22 ENCOUNTER — TELEPHONE (OUTPATIENT)
Dept: INTERNAL MEDICINE CLINIC | Age: 68
End: 2018-10-22

## 2018-10-22 VITALS
OXYGEN SATURATION: 99 % | SYSTOLIC BLOOD PRESSURE: 130 MMHG | DIASTOLIC BLOOD PRESSURE: 78 MMHG | TEMPERATURE: 98 F | HEIGHT: 69 IN | WEIGHT: 231.26 LBS | RESPIRATION RATE: 16 BRPM | HEART RATE: 80 BPM | BODY MASS INDEX: 34.25 KG/M2

## 2018-10-22 LAB
ANION GAP SERPL CALC-SCNC: 6 MMOL/L (ref 5–15)
BUN SERPL-MCNC: 24 MG/DL (ref 6–20)
BUN/CREAT SERPL: 16 (ref 12–20)
CALCIUM SERPL-MCNC: 8.3 MG/DL (ref 8.5–10.1)
CHLORIDE SERPL-SCNC: 110 MMOL/L (ref 97–108)
CO2 SERPL-SCNC: 28 MMOL/L (ref 21–32)
CREAT SERPL-MCNC: 1.54 MG/DL (ref 0.55–1.02)
ERYTHROCYTE [DISTWIDTH] IN BLOOD BY AUTOMATED COUNT: 18.6 % (ref 11.5–14.5)
GLUCOSE BLD STRIP.AUTO-MCNC: 132 MG/DL (ref 65–100)
GLUCOSE BLD STRIP.AUTO-MCNC: 89 MG/DL (ref 65–100)
GLUCOSE SERPL-MCNC: 78 MG/DL (ref 65–100)
HCT VFR BLD AUTO: 36 % (ref 35–47)
HGB BLD-MCNC: 11.1 G/DL (ref 11.5–16)
MCH RBC QN AUTO: 28.7 PG (ref 26–34)
MCHC RBC AUTO-ENTMCNC: 30.8 G/DL (ref 30–36.5)
MCV RBC AUTO: 93 FL (ref 80–99)
NRBC # BLD: 0 K/UL (ref 0–0.01)
NRBC BLD-RTO: 0 PER 100 WBC
PLATELET # BLD AUTO: 129 K/UL (ref 150–400)
PMV BLD AUTO: 12.2 FL (ref 8.9–12.9)
POTASSIUM SERPL-SCNC: 3.8 MMOL/L (ref 3.5–5.1)
RBC # BLD AUTO: 3.87 M/UL (ref 3.8–5.2)
SERVICE CMNT-IMP: ABNORMAL
SERVICE CMNT-IMP: NORMAL
SODIUM SERPL-SCNC: 144 MMOL/L (ref 136–145)
WBC # BLD AUTO: 6.4 K/UL (ref 3.6–11)

## 2018-10-22 PROCEDURE — 36415 COLL VENOUS BLD VENIPUNCTURE: CPT | Performed by: INTERNAL MEDICINE

## 2018-10-22 PROCEDURE — 80048 BASIC METABOLIC PNL TOTAL CA: CPT | Performed by: INTERNAL MEDICINE

## 2018-10-22 PROCEDURE — 74011250637 HC RX REV CODE- 250/637: Performed by: INTERNAL MEDICINE

## 2018-10-22 PROCEDURE — 82962 GLUCOSE BLOOD TEST: CPT

## 2018-10-22 PROCEDURE — 85027 COMPLETE CBC AUTOMATED: CPT | Performed by: INTERNAL MEDICINE

## 2018-10-22 RX ORDER — POTASSIUM CHLORIDE 750 MG/1
10 TABLET, FILM COATED, EXTENDED RELEASE ORAL DAILY
Qty: 30 TAB | Refills: 0 | Status: SHIPPED
Start: 2018-10-22 | End: 2018-11-28 | Stop reason: SDUPTHER

## 2018-10-22 RX ORDER — CLONAZEPAM 0.5 MG/1
TABLET ORAL
Qty: 30 TAB | Refills: 0 | Status: SHIPPED | OUTPATIENT
Start: 2018-10-22 | End: 2018-11-20 | Stop reason: SDUPTHER

## 2018-10-22 RX ADMIN — METOPROLOL SUCCINATE 25 MG: 25 TABLET, EXTENDED RELEASE ORAL at 08:09

## 2018-10-22 RX ADMIN — VENLAFAXINE HYDROCHLORIDE 150 MG: 150 CAPSULE, EXTENDED RELEASE ORAL at 08:08

## 2018-10-22 RX ADMIN — POTASSIUM CHLORIDE 10 MEQ: 750 TABLET, FILM COATED, EXTENDED RELEASE ORAL at 08:09

## 2018-10-22 RX ADMIN — Medication 400 MG: at 08:09

## 2018-10-22 RX ADMIN — Medication 5 ML: at 05:00

## 2018-10-22 RX ADMIN — ASPIRIN 81 MG CHEWABLE TABLET 81 MG: 81 TABLET CHEWABLE at 08:09

## 2018-10-22 RX ADMIN — DABIGATRAN ETEXILATE MESYLATE 150 MG: 150 CAPSULE ORAL at 08:08

## 2018-10-22 RX ADMIN — PREGABALIN 200 MG: 100 CAPSULE ORAL at 08:09

## 2018-10-22 RX ADMIN — BUMETANIDE 1 MG: 1 TABLET ORAL at 08:09

## 2018-10-22 RX ADMIN — Medication 1 CAPSULE: at 08:09

## 2018-10-22 NOTE — PROGRESS NOTES
Hospital to [facility] SBAR Handoff - Bhavik Jameson 79 y.o.   female [address]   Room: 2154/01    [facility] [address] [facility]  Unit Phone# :  [phone] [address] 
[facility] [address] [address] Dept: [phone] Loc: [id number] SITUATION Admitted:  10/6/2018         Attending Provider:  Liliana Hernandez MD    
 
Consultations:  IP CONSULT TO CARDIOLOGY 
IP CONSULT TO [address] PHYSICIAN 
 
PCP:  Liliana Hernandez MD   [phone] Treatment Team: Attending Provider: Liliana Hernandez MD; Consulting Provider: Chip Cartwright MD; Consulting Provider: Natan Mason MD; Utilization Review: Nathaniel Blackburn; Nurse Practitioner: Mireille Pina NP; Care Manager: Salima Alexander RN Admitting Dx:  Acute respiratory failure ([facility] [address] Principal Problem: Acute respiratory failure with hypoxemia ([facility] [address] * No surgery found * of  
  
BY: * Surgery not found *             ON: * No surgery found * Code Status: Full Code Advance Directives:  
Advance Care Planning 10/12/2018 Patient's Healthcare Decision Maker is: Verbal statement (Legal Next of Kin remains as decision maker) Primary Decision Maker Name Efren Whittington Primary Decision Maker Phone Number [phone] Primary Decision Maker Relationship to Patient Spouse Secondary Decision Maker Name - Secondary Decision Maker Phone Number - Secondary Decision Maker Relationship to Patient -  
Confirm Advance Directive None Patient Would Like to Complete Advance Directive No  
Does the patient have other document types - (Send w/patient) No Doesnt Have Isolation:  Contact       MDRO: ESBL Pain Medications given:  None Special Equipment needed: Yes  Type of equipment: Walker/Cane BACKGROUND Allergies: Allergies Allergen Reactions  Sulfa (Sulfonamide Antibiotics) Swelling  Amoxicillin Swelling Past Medical History:  
Diagnosis Date  Anxiety 1/22/2018  Arthritis OA  Asthma  Diabetes ([facility] [address]  Essential hypertension  GERD (gastroesophageal reflux disease)  Hypercholesterolemia 1/22/2018  Hypertension  Long-term use of high-risk medication 1/22/2018  Neuropathy  Other ill-defined conditions(799.89) IBS, spinal stenosis  Plantar fasciitis  Psychiatric disorder   
 depression, anxiety  Sleep apnea   
 uses CPAP  Type 2 diabetes mellitus with diabetic neuropathy, without long-term current use of insulin ([facility] [address] 6/5/2016 Past Surgical History:  
Procedure Laterality Date  CARDIAC SURG PROCEDURE UNLIST    
 stent  HX APPENDECTOMY  HX HYSTERECTOMY  HX PACEMAKER Medications Prior to Admission Medication Sig  Potassium Gluconate 595 mg (99 mg) tablet Take  by mouth daily.  conjugated estrogens (PREMARIN) 0.625 mg/gram vaginal cream Insert  into vagina every Tuesday and Friday. Tuesdays and Fridays  calcium carb,gluc/mag ox,gluc (CALCIUM MAGNESIUM PO) Take 1 Tab by mouth daily. Calcium/Magnesium combo pill per patient  venlafaxine-SR (EFFEXOR XR) 150 mg capsule Take 150 mg by mouth two (2) times daily (with meals).  dabigatran etexilate (PRADAXA) 150 mg capsule Take 150 mg by mouth two (2) times a day.  pregabalin (LYRICA) 200 mg capsule Take 200 mg by mouth two (2) times a day.  cholecalciferol, vitamin d3, (VITAMIN D3) 400 unit cap Take 400 Units by mouth daily.  amLODIPine (NORVASC) 5 mg tablet TAKE 1 TABLET EVERY DAY  aspirin 81 mg chewable tablet Take 81 mg by mouth daily.  lisinopril (PRINIVIL, ZESTRIL) 20 mg tablet Take 1 Tab by mouth daily.  atorvastatin (LIPITOR) 40 mg tablet Take 1 Tab by mouth nightly.   
 [DISCONTINUED] metoprolol succinate (TOPROL-XL) 100 mg tablet Take 100 mg by mouth daily.  [DISCONTINUED] bumetanide (BUMEX) 1 mg tablet TAKE 1 TABLET IN THE MORNING ON TUESDAY & THURSDAY AND 2 TABLETS IN THE MORNING ON MONDAY, [facility] AND FRIDAY  [DISCONTINUED] clonazePAM (KLONOPIN) 0.5 mg tablet TAKE 1 TABLET EVERY NIGHT. MAX DAILY DOSE OF 0.5MG. Hard scripts included in transfer packet: Yes 
 
Vaccinations:   
Immunization History Administered Date(s) Administered  Influenza High Dose Vaccine PF 09/20/2017  Influenza Vaccine 10/01/2016  Influenza Vaccine (Quad) PF 10/17/2018 The [employer] Index tool is an evidenced based tool that has more automatic generated information. The tool looks at many different items such as the age of the patient, how many times they were admitted in the last calendar year, current length of stay in the hospital and their diagnosis. All of these items are pulled automatically from information documented in the chart from various places and will generate a score that predicts whether a patient is at low (less than 13), medium (13-20) or high (21 or greater) risk of being readmitted. ASSESSMENT Temp: 98 °F (36.7 °C) (10/22/18 1057) Pulse (Heart Rate): 80 (10/22/18 1057) Resp Rate: 16 (10/22/18 1057)           BP: 130/78 (10/22/18 1057) O2 Sat (%): 99 % (10/22/18 1057) Weight: 104.9 kg (231 lb 4.2 oz)    Height: 5' 9\" (175.3 cm) (10/06/18 1012) If above not within 1 hour of discharge: 
 
BP:_____  P:____  R:____ T:_____ O2 Sat: ___%  O2: ______ Active Orders Diet DIET CARDIAC Regular Orientation: oriented to time, place, person and situation Active Behaviors: None Active Lines/Drains:  (Peg Tube / Vargas / CL or S/L?): no 
 
Urinary Status: Voiding, Bathroom privileges     Last BM: Last Bowel Movement Date: 10/20/18 Skin Integrity: Incision (comment)(left chest) Mobility: Slightly limited Weight Bearing Status: WBAT (Weight Bearing as Tolerated) Gait Training Assistive Device: Walker, rolling, Gait belt Ambulation - Level of Assistance: Contact guard assistance Distance (ft): 120 Feet (ft)(30 ft in room x4 laps) Lab Results Component Value Date/Time Glucose 78 10/22/2018 05:12 AM  
 Hemoglobin A1c 5.0 08/01/2018 10:17 AM  
 INR 1.1 06/04/2016 07:33 PM  
 INR 1.0 04/02/2014 10:45 AM  
 HGB 11.1 (L) 10/22/2018 05:12 AM  
 HGB 10.6 (L) 10/20/2018 03:44 AM  
  
  RECOMMENDATION See After Visit Summary (AVS) for: · Discharge instructions · [facility] · Special equipment needed (entered pre-discharge by Care Management) · Medication Reconciliation · Follow up Appointment(s) Report given/sent by:  Renato Ross Verbal report given to: [employer] Estimated discharge time:  10/22/2018 at 1500

## 2018-10-22 NOTE — PROGRESS NOTES
CM called [facility] re: bed availability for discharge for today. CM left a message with admissions requesting a return call for bed number. Pt's family to transport at discharge. No further needs identified at this time. CM will continue to remain available for support, discharge planning as needed. 9:22 a.m. 
CM received return call from [facility] with [facility]. Vitaly Guzman will be able to accept the patient for this afternoon at 3:00. CM to notify nursing staff of time of discharge. Nursing to call report to [phone], room 419B. Please send discharge summary, MAR and hard scripts with patient upon discharge. No further questions or needs identified at this time. CM to continue to remain available for support, discharge planning as needed. Care Management Interventions PCP Verified by CM: Yes Mode of Transport at Discharge: Other (see comment)(Pt family to transport) Transition of Care Consult (CM Consult):  [address]: Yes 
Partner SNF: Yes Discharge Durable Medical Equipment: No 
Physical Therapy Consult: Yes Occupational Therapy Consult: Yes Current Support Network: Own Home, Lives with Spouse Confirm Follow Up Transport: Family Plan discussed with Pt/Family/Caregiver: Yes Freedom of Choice Offered: Yes Discharge Location Discharge Placement: Skilled nursing facility [facility] [phone]

## 2018-10-22 NOTE — TELEPHONE ENCOUNTER
Lucien Pak  called the office stating patient was released from the hospital today & going to [facility]. The paperwork they were given stated for patient to stop taking her Premarin cream which they were originally told she was to be on this forever. Patient's  is calling the office to get clarification as to whether or not patient is to remain taking the Premarin cream as patient was previously instructed or discontinue this medication as discharge paperwork instructed. Patient's  may be reached at [phone] (m).

## 2018-10-22 NOTE — PROGRESS NOTES
[address] [place] [address] 
[phone] Medical Progress Note NAME: Sameer Merlos :  1950 MRM:  [id number] Date/Time: 10/22/2018  6:02 AM 
  
Subjective:  
 
Admitted with acute respiratory failure with hypoxemia due to systolic CHF associated with atrial fib with RVR. Bedside EF 15-20%. Chest xray showed moderate CHF. Respiratory failure rapidly progressed with subsequent intubation. Patient on pressors for short period of time. Afib controlled with amiodarone with exacerbations due to hypokalemia related to diuresis. Successfully extubated  complicated by stridor. Patient currently awake,oriented x3. Rhythm paced. O2 sat's adequate  K+ 3.8. Renal function stable. No new complaints. Past Medical History:  
Diagnosis Date  Anxiety 2018  Arthritis OA  Asthma  Diabetes ([facility] [address]  Essential hypertension  GERD (gastroesophageal reflux disease)  Hypercholesterolemia 2018  Hypertension  Long-term use of high-risk medication 2018  Neuropathy  Other ill-defined conditions(799.89) IBS, spinal stenosis  Plantar fasciitis  Psychiatric disorder   
 depression, anxiety  Sleep apnea   
 uses CPAP  Type 2 diabetes mellitus with diabetic neuropathy, without long-term current use of insulin ([facility] [address] 2016 ROS:  General: postive for weakness Respiratory: negative for cough, congestion Cardiology:  negative for chest pain, palpitations, 
Gastrointestinal: negative for abdominal pain, N/V Muskuloskeletal : negative for arthralgia, myalgia Neurological: negative for headache, dizziness or focal deficits Psychological: positive for less anxiety Review of systems otherwise negative Objective:  
 
 
Vitals:  
 
  
Last 24hrs VS reviewed since prior progress note. Most recent are: 
 
Visit Vitals /84 Pulse 81 Temp 97.8 °F (36.6 °C) Resp 15  
 Ht 5' 9\" (1.753 m) Wt 231 lb 4.2 oz (104.9 kg) SpO2 98% Breastfeeding? No  
BMI 34.15 kg/m² SpO2 Readings from Last 6 Encounters:  
10/22/18 98% 09/10/18 96% 08/07/18 94% 08/01/18 96% 03/14/18 96% 02/22/18 97% O2 Flow Rate (L/min): 2 l/min Intake/Output Summary (Last 24 hours) at 10/22/2018 4609 Last data filed at 10/22/2018 2802 Gross per 24 hour Intake 840 ml Output 700 ml Net 140 ml Telemetry reviewed:   afib Tubes:   Vargas, central line X-Ray:  Admission chest xray - moderated CHF Procedures:   Echo - Left ventricle: Ejection fraction was estimated in the range of 15 % to 20 
%. There was diffuse hypokinesis. Mitral valve: There was mild regurgitation. BIV-ICD upgrade AV node ablation Exam:  
 
General   well developed, well nourished, appears stated age in no respiratory distress Neck   Supple without nodes or mass. No thyromegaly or bruit Respiratory   Lungs much clearer Cardiology  RRR without murmur Abdominal  Soft, non-tender, non-distended, positive bowel sounds, no hepatosplenomegaly Extremities  Without LE edema Skin  Normal skin turgor. No rashes or skin ulcers noted Neurological No focal deficit noted Psychological  Alert, oriented x3 Exam otherwise negative Lab Data Reviewed: (see below) Medications Reviewed: (see below) 
 
______________________________________________________________________ Medications:  
 
Current Facility-Administered Medications Medication Dose Route Frequency  clonazePAM (KlonoPIN) tablet 0.5 mg  0.5 mg Oral QHS  sodium chloride (NS) flush 5-10 mL  5-10 mL IntraVENous Q8H  
 sodium chloride (NS) flush 5-10 mL  5-10 mL IntraVENous PRN  
 metoprolol succinate (TOPROL-XL) XL tablet 25 mg  25 mg Oral DAILY  dabigatran etexilate (PRADAXA) capsule 150 mg  150 mg Oral BID  vancomycin (VANCOCIN) 1,000 mg injection  sodium chloride (NS) flush 5-10 mL  5-10 mL IntraVENous PRN  
  lactobac ac& pc-s.therm-b.anim (JONNIE Q/RISAQUAD)  1 Cap Oral DAILY  lisinopril (PRINIVIL, ZESTRIL) tablet 2.5 mg  2.5 mg Oral DAILY  bumetanide (BUMEX) tablet 1 mg  1 mg Oral DAILY  potassium chloride SR (KLOR-CON 10) tablet 10 mEq  10 mEq Oral DAILY  conjugated estrogens (PREMARIN) 0.625 mg/gram vaginal cream 0.5 g  0.5 g Vaginal EVERY TU & FRI  insulin lispro (HUMALOG) injection   SubCUTAneous AC&HS  
 glucose chewable tablet 16 g  4 Tab Oral PRN  
 dextrose (D50W) injection syrg 12.5-25 g  12.5-25 g IntraVENous PRN  
 glucagon (GLUCAGEN) injection 1 mg  1 mg IntraMUSCular PRN  
 venlafaxine-SR (EFFEXOR-XR) capsule 150 mg  150 mg Oral BID  magnesium oxide (MAG-OX) tablet 400 mg  400 mg Oral DAILY  pregabalin (LYRICA) capsule 200 mg  200 mg Oral BID  aspirin chewable tablet 81 mg  81 mg Oral DAILY  atorvastatin (LIPITOR) tablet 40 mg  40 mg Oral QHS  sodium chloride (NS) flush 5-10 mL  5-10 mL IntraVENous PRN  
 acetaminophen (TYLENOL) tablet 650 mg  650 mg Oral Q6H PRN  
 docusate sodium (COLACE) capsule 100 mg  100 mg Oral DAILY PRN Lab Review:  
 
Recent Labs 10/22/18 
7135 10/20/18 
1638 WBC 6.4 7.0 HGB 11.1* 10.6* HCT 36.0 34.5*  
* 115* Recent Labs 10/22/18 
3385 10/21/18 
0321 10/20/18 
8609  144 144  
K 3.8 3.9 4.5  
* 113* 111* CO2 28 27 28 GLU 78 78 73 BUN 24* 22* 21* CREA 1.54* 1.40* 1.32* CA 8.3* 8.0* 8.3* Lab Results Component Value Date/Time Glucose (POC) 101 (H) 10/21/2018 11:15 PM  
 Glucose (POC) 130 (H) 10/21/2018 04:21 PM  
 Glucose (POC) 112 (H) 10/21/2018 12:00 PM  
 Glucose (POC) 82 10/21/2018 07:17 AM  
 Glucose (POC) 125 (H) 10/20/2018 09:17 PM  
 
No results for input(s): PH, PCO2, PO2, HCO3, FIO2 in the last 72 hours. No results for input(s): INR in the last 72 hours. No lab exists for component: INREXT, INREXT Assessment:  
 
Principal Problem: Acute respiratory failure with hypoxemia ([facility] [address] (10/6/2018) Active Problems: 
  Type 2 diabetes mellitus with diabetic neuropathy, without long-term current use of insulin ([facility] [address] (6/5/2016) Acute systolic heart failure ([facility] [address] (4/4/2014) Atrial fibrillation with rapid ventricular response ([facility] [address] (10/7/2018) Hypokalemia (10/7/2018) AYLIN (acute kidney injury) ([facility] [address] (10/13/2018) Overview: Suspect prerenal 
 
 
 
  
Plan:  
 
Risk of deterioration: high 1. Stable medically through the weekend 2. Discharge to skilled rehab today Care Plan discussed with: Nursing Staff, patient Discussed:  Care Plan Prophylaxis:  Lovenox and SCD's Disposition:  Unknown 
        
___________________________________________________ Attending Physician: Kaylie Lance MD

## 2018-10-22 NOTE — PROGRESS NOTES
Discharge instructions reviewed with Patient. Patient verbalized understanding of instructions. Opportunities for questions were provided and explained. Patient discharged [facility]. Discharge medications reviewed with patient and appropriate educational materials and side effects teaching were provided.

## 2018-10-22 NOTE — DISCHARGE SUMMARY
[address]  [phone]    Hospital Discharge Summary        Patient ID:  Velma Dickens  [id number]  79 y.o.  1950    Admit date: 10/6/2018  Discharge date and time: 10/22/2018    Admission Diagnoses: Acute respiratory failure [facility])     Discharge Diagnoses:     Principal Problem:    Acute respiratory failure with hypoxemia ([address] (10/6/2018)    Active Problems:    Type 2 diabetes mellitus with diabetic neuropathy, without long-term current use of insulin ([address] (6/5/2016)      Acute systolic heart failure ([address] (4/4/2014)      Atrial fibrillation with rapid ventricular response ([address] (10/7/2018)      Hypokalemia (10/7/2018)      AYLIN (acute kidney injury) ([address] (10/13/2018)      Overview: Suspect prerenal         Past Medical History:   Diagnosis Date    Anxiety 1/22/2018    Arthritis     OA    Asthma     Diabetes ([address]     Essential hypertension     GERD (gastroesophageal reflux disease)     Hypercholesterolemia 1/22/2018    Hypertension     Long-term use of high-risk medication 1/22/2018    Neuropathy     Other ill-defined conditions(799.89)     IBS, spinal stenosis    Plantar fasciitis     Psychiatric disorder     depression, anxiety    Sleep apnea     uses CPAP    Type 2 diabetes mellitus with diabetic neuropathy, without long-term current use of insulin ([address] 6/5/2016         PCP: Som Condon MD    Consults: Cardiology and Pulmonary/Intensive care      History of Present Illness: Per hospitalist -   This is a 53-year-old female with past medical history of systolic congestive heart failure, atrial fibrillation is coming to the hospital with chief complaints of shortness of breath since last 2 days.  Patient reports being in her usual state of health until about 2 days ago when she noticed shortness of breath which is worse with exertion, with orthopnea but no PND. Soo Kriss reports mild cough but no phlegm.  Denies palpitations. Sammie Soto does report associated chest pressure along with shortness of breath.  Denies abdominal pain, diarrhea or constipation.  Denies fever or chills.  When EMS arrived she was saturating about 88% on arrival and her saturations did not improve on nonrebreather.  She reports she has not been taking her usual medication since this morning. On arrival to the hospital she was tachycardic and blood pressure was 139/107.  She was saturating at about 88% on room air.  Lab work revealed hemoglobin of 12.9 WBC of 9.5.  BMP shows a potassium of 2.8, creatinine of 1.35. BNP was 6600, troponin 0 0.05.  She had a chest x-ray which revealed no development of moderate congestive heart failure. Admission Physical Exam: Per hospitalist -   VITALS:         Visit Vitals    BP (!) 128/97    Pulse (!) 123    Temp 98.3 °F (36.8 °C)    Resp 19    Ht 5' 9\" (1.753 m)    Wt 110 kg (242 lb 8.1 oz)    SpO2 96%    BMI 35.81 kg/m2         PHYSICAL EXAM:     General:          no distress, appears stated age. HEENT:           Atraumatic, anicteric sclerae, pink conjunctivae                          No oral ulcers, mucosa moist  Neck:               Supple, symmetrical,  thyroid: non tender  Lungs:             Air entry diminished, crackles +, no wheeze   Chest wall:      No tenderness  No Accessory muscle use. Heart:              Regular  rhythm,  No  murmur   No edema  Abdomen:        Soft, non-tender. Not distended. Bowel sounds normal  Extremities:     No cyanosis. No clubbing,                            Skin turgor normal, Capillary refill normal, Radial dial pulse 2+  Skin:                Not Jaundiced  No rashes   Psych:             Not anxious or agitated. Neurologic:      EOMs intact. No facial asymmetry. No aphasia or slurred speech. Symmetrical strength, Sensation grossly intact. Alert and oriented X 4. Admission Labs:  CBC: 10/6/2018: HCT 39.7 % (Ref range: 35.0 - 47.0 %);  HGB 12.9 g/dL (Ref range: 11.5 - 16.0 g/dL); PLATELET 368 K/uL (Ref range: 150 - 400 K/uL); RBC 4.44 M/uL (Ref range: 3.80 - 5.20 M/uL); WBC 9.5 K/uL (Ref range: 3.6 - 11.0 K/uL)  10/7/2018: HCT 36.9 % (Ref range: 35.0 - 47.0 %); HGB 11.9 g/dL (Ref range: 11.5 - 16.0 g/dL); PLATELET 686 K/uL (Ref range: 150 - 400 K/uL); RBC 4.12 M/uL (Ref range: 3.80 - 5.20 M/uL); WBC 10.4 K/uL (Ref range: 3.6 - 11.0 K/uL)   BMP: 10/6/2018: BUN 22 MG/DL* (Ref range: 6 - 20 MG/DL); Calcium 8.4 MG/DL* (Ref range: 8.5 - 10.1 MG/DL); Chloride 108 mmol/L (Ref range: 97 - 108 mmol/L); CO2 28 mmol/L (Ref range: 21 - 32 mmol/L); Creatinine 1.35 MG/DL* (Ref range: 0.55 - 1.02 MG/DL); Glucose 140 mg/dL* (Ref range: 65 - 100 mg/dL); Potassium 2.8 mmol/L* (Ref range: 3.5 - 5.1 mmol/L); Sodium 145 mmol/L (Ref range: 136 - 145 mmol/L)  10/7/2018: BUN 21 MG/DL* (Ref range: 6 - 20 MG/DL); BUN 19 MG/DL (Ref range: 6 - 20 MG/DL); Calcium 7.9 MG/DL* (Ref range: 8.5 - 10.1 MG/DL); Calcium 7.8 MG/DL* (Ref range: 8.5 - 10.1 MG/DL); Chloride 108 mmol/L (Ref range: 97 - 108 mmol/L); Chloride 106 mmol/L (Ref range: 97 - 108 mmol/L); CO2 29 mmol/L (Ref range: 21 - 32 mmol/L); CO2 28 mmol/L (Ref range: 21 - 32 mmol/L); Creatinine 1.38 MG/DL* (Ref range: 0.55 - 1.02 MG/DL); Creatinine 1.54 MG/DL* (Ref range: 0.55 - 1.02 MG/DL); Glucose 131 mg/dL* (Ref range: 65 - 100 mg/dL); Glucose 155 mg/dL* (Ref range: 65 - 100 mg/dL); Potassium 2.7 mmol/L* (Ref range: 3.5 - 5.1 mmol/L); Potassium 2.9 mmol/L* (Ref range: 3.5 - 5.1 mmol/L); Sodium 146 mmol/L* (Ref range: 136 - 145 mmol/L); Sodium 142 mmol/L (Ref range: 136 - 145 mmol/L)  Coagulation: No results found for requested labs within first 48 hours of the last admission day. Cardiac markers: No results found for requested labs within first 48 hours of the last admission day. Liver: 10/6/2018: Albumin 3.3 g/dL* (Ref range: 3.5 - 5.0 g/dL); Alk.  phosphatase 127 U/L* (Ref range: 45 - 117 U/L); AST (SGOT) 17 U/L (Ref range: 15 - 37 U/L); Protein, total 6.9 g/dL (Ref range: 6.4 - 8.2 g/dL)    Discharge Labs:  Recent Results (from the past 24 hour(s))   GLUCOSE, POC    Collection Time: 10/21/18  7:17 AM   Result Value Ref Range    Glucose (POC) 82 65 - 100 mg/dL    Performed by Jessica Haque St, POC    Collection Time: 10/21/18 12:00 PM   Result Value Ref Range    Glucose (POC) 112 (H) 65 - 100 mg/dL    Performed by Macarena Don    GLUCOSE, POC    Collection Time: 10/21/18  4:21 PM   Result Value Ref Range    Glucose (POC) 130 (H) 65 - 100 mg/dL    Performed by Macarena Don    GLUCOSE, POC    Collection Time: 10/21/18 11:15 PM   Result Value Ref Range    Glucose (POC) 101 (H) 65 - 100 mg/dL    Performed by Stacie Johansen    CBC W/O DIFF    Collection Time: 10/22/18  5:12 AM   Result Value Ref Range    WBC 6.4 3.6 - 11.0 K/uL    RBC 3.87 3.80 - 5.20 M/uL    HGB 11.1 (L) 11.5 - 16.0 g/dL    HCT 36.0 35.0 - 47.0 %    MCV 93.0 80.0 - 99.0 FL    MCH 28.7 26.0 - 34.0 PG    MCHC 30.8 30.0 - 36.5 g/dL    RDW 18.6 (H) 11.5 - 14.5 %    PLATELET 110 (L) 801 - 400 K/uL    MPV 12.2 8.9 - 12.9 FL    NRBC 0.0 0  WBC    ABSOLUTE NRBC 0.00 0.00 - 6.68 K/uL   METABOLIC PANEL, BASIC    Collection Time: 10/22/18  5:12 AM   Result Value Ref Range    Sodium 144 136 - 145 mmol/L    Potassium 3.8 3.5 - 5.1 mmol/L    Chloride 110 (H) 97 - 108 mmol/L    CO2 28 21 - 32 mmol/L    Anion gap 6 5 - 15 mmol/L    Glucose 78 65 - 100 mg/dL    BUN 24 (H) 6 - 20 MG/DL    Creatinine 1.54 (H) 0.55 - 1.02 MG/DL    BUN/Creatinine ratio 16 12 - 20      GFR est AA 41 (L) >60 ml/min/1.73m2    GFR est non-AA 34 (L) >60 ml/min/1.73m2    Calcium 8.3 (L) 8.5 - 10.1 MG/DL       Significant Diagnostic Studies:   X-Ray:  Admission chest xray - moderated CHF   Procedures:   Echo - Left ventricle: Ejection fraction was estimated in the range of 15 % to 20  %. There was diffuse hypokinesis. Mitral valve:  There was mild regurgitation.     BIV-ICD upgrade     AV node ablation    Hospital Course: Admitted with acute respiratory failure with hypoxemia due to systolic CHF associated with atrial fib with RVR. Bedside EF 15-20%. Chest xray showed moderate CHF. Respiratory failure rapidly progressed with subsequent intubation. Patient on pressors for short period of time. Afib controlled with amiodarone with exacerbations due to hypokalemia related to diuresis. Successfully extubated 80/22 complicated by stridor but resolved quickly. CHF controlled with diuresis but afib management continued to be problematic. Participated with PT/OT. Decision ultimately made for ICD-BIV upgrade and AV node ablation which she tolerated without complication. Arrangements made for SNF placement post discharge and she was transferred for this in stable medical condition    Disposition: skilled rehab    Activity: per PT/OT     Diet: regular    Follow up appointment: Dr Marah Harry 1 month, ICD check 2 weeks, Dr Vahid Velasquez once discharged from rehab     Patient Instructions:   Current Discharge Medication List      START taking these medications    Details   potassium chloride SR (KLOR-CON 10) 10 mEq tablet Take 1 Tab by mouth daily. Qty: 30 Tab, Refills: 0         CONTINUE these medications which have CHANGED    Details   clonazePAM (KLONOPIN) 0.5 mg tablet TAKE 1 TABLET EVERY NIGHT. MAX DAILY DOSE OF 0.5MG. Qty: 30 Tab, Refills: 0    Associated Diagnoses: Anxiety      bumetanide (BUMEX) 1 mg tablet Take 1 Tab by mouth daily. Qty: 30 Tab, Refills: 11    Associated Diagnoses: Essential hypertension      lisinopril (PRINIVIL, ZESTRIL) 2.5 mg tablet Take 1 Tab by mouth daily. Qty: 30 Tab, Refills: 12      metoprolol succinate (TOPROL-XL) 25 mg XL tablet Take 1 Tab by mouth daily. Qty: 30 Tab, Refills: 12         CONTINUE these medications which have NOT CHANGED    Details   Potassium Gluconate 595 mg (99 mg) tablet Take  by mouth daily.       venlafaxine-SR (EFFEXOR XR) 150 mg capsule Take 150 mg by mouth two (2) times daily (with meals). dabigatran etexilate (PRADAXA) 150 mg capsule Take 150 mg by mouth two (2) times a day. pregabalin (LYRICA) 200 mg capsule Take 200 mg by mouth two (2) times a day. cholecalciferol, vitamin d3, (VITAMIN D3) 400 unit cap Take 400 Units by mouth daily. aspirin 81 mg chewable tablet Take 81 mg by mouth daily. Associated Diagnoses: Unspecified hereditary and idiopathic peripheral neuropathy; Essential and other specified forms of tremor; Migraine with aura, without mention of intractable migraine without mention of status migrainosus; Type II or unspecified type diabetes mellitus with neurological manifestations, not stated as uncontrolled(250.60) ([address] B12 deficiency; Ataxia; Spinal stenosis, lumbar region, without neurogenic claudication; HBP (high blood pressure); Polyneuropathy in diabetes(357.2); Type II or unspecified type diabetes mellitus with neurological manifestations, not stated as uncontrolled(250.60) ([address] Polyneuropathy in diabetes(357.2)      atorvastatin (LIPITOR) 40 mg tablet Take 1 Tab by mouth nightly.   Qty: 30 Tab, Refills: 12         STOP taking these medications       conjugated estrogens (PREMARIN) 0.625 mg/gram vaginal cream Comments:   Reason for Stopping:         calcium carb,gluc/mag ox,gluc (CALCIUM MAGNESIUM PO) Comments:   Reason for Stopping:         amLODIPine (NORVASC) 5 mg tablet Comments:   Reason for Stopping:         venlafaxine (EFFEXOR) 75 mg tablet Comments:   Reason for Stopping:         estrogens, conjugated (PREMARIN VA) Comments:   Reason for Stopping:               Wound Care: As directed    Signed:  Paz Soto MD  10/22/2018  6:22 AM

## 2018-10-22 NOTE — PROGRESS NOTES
S/p SJM BIV-ICD upgrade to prior pacemaker 10/18 followed by AV node ablation 10/19 for rate control. Ambulatory with a walker and taking oral.  +fatigue with bathing earlier. Admits to STEEN, but says she's \"fine\", just not used to exerting herself. Visit Vitals /74 (BP 1 Location: Left arm, BP Patient Position: Sitting) Pulse 80 Temp 97.8 °F (36.6 °C) Resp 16 Ht 5' 9\" (1.753 m) Wt 104.9 kg (231 lb 4.2 oz) SpO2 99% Breastfeeding? No  
BMI 34.15 kg/m² ND, NAD. L chest site OK. Expected bruising. Regular paced rhythm, no rub. Lungs CTAB anteriorly. No unilateral arm edema. Awake, appropriate, neuro grossly nonfocal. 
 
Tele:  Afib with V pacing PLAN:  Discharge to home with F/U in the office for wound and device programming check in 2-4 weeks with Dr. Karen Theodore nurse, . Has F/U with other providers as well. All questions answered. Patient is aware of signs and sx warranting urgent med F/U or calling 911. Signed By: Jose Paul MD   
 October 22, 2018

## 2018-10-23 ENCOUNTER — DOCUMENTATION ONLY (OUTPATIENT)
Dept: INTERNAL MEDICINE CLINIC | Age: 68
End: 2018-10-23

## 2018-10-23 ENCOUNTER — PATIENT OUTREACH (OUTPATIENT)
Dept: INTERNAL MEDICINE CLINIC | Age: 68
End: 2018-10-23

## 2018-10-23 NOTE — PROGRESS NOTES
Transition of Care Coordination/Hospital to Post Acute Facility: 
  
Date/Time:  10/23/2018 4:25 PM 
 
Patient was admitted to [facility] on 10/6/18 and discharged on 10/22/18 for acute respiratory failure with hypoxemia. Patient was transferred to [facility] for continuation of care. Inpatient RRAT score: 27 Top Challenges reviewed Method of communication with care team :chart routing Nurse Navigator(NN) spoke with Mehran Liu to provide introduction to self and explanation of the Nurse Navigator Role. Verified name and  as patient identifiers. Discussed and reviewed  anticipated length of stay, medication reconciliation ACP:  
Does the patient have a current ACP (including DDNR):  yes Does the post acute facility have a copy of the patients ACP:  yes Medication(s):  
New Medications at Discharge: Jordana Wilkerson Changed Medications at Discharge: bumex, effexor xr, lisinopril, metoprolol Discontinued Medications at Discharge: amlodipine, calcium, premarin PCP/Specialist follow up:  
Future Appointments Date Time Provider Tammy Perales 2019  8:00 AM John STERN MD [address] [place] [address], [id number] Opportunity to ask questions was provided. Contact information was provided for future reference or further questions. Will continue to monitor.

## 2018-10-23 NOTE — TELEPHONE ENCOUNTER
Steroid creams can increase risk of blood clots and should remain off this for a couple of weeks until blood thinner has time to work

## 2018-10-24 ENCOUNTER — PATIENT OUTREACH (OUTPATIENT)
Dept: CASE MANAGEMENT | Age: 68
End: 2018-10-24

## 2018-10-24 NOTE — PROGRESS NOTES
Community Care Team documentation for patient in [facility] Initial Follow Up Patient was admitted to [facility] on 10/6/18 and discharged 10/22/18 to [address], [facility]. Hospital Discharge diagnosis:  Acute respiratory failure with hypoxemia RRAT score: 27 Advance Care Planning:  POST, DDNR, and Advance Directive on file. PCP : Khanh Daniels MD 
Nurse Navigator in PCP office: Juan Pablo Davis Note routed to Nurse Navigator team. 
 
SNF Attending:  Alice Li MD 
 
Spoke with SNF team. Ensured patient arrived to SNF safely with admission packet in order. PT/OT providing skilled therapy in SNF. Patient on pacemaker precautions. Currently mod I with bed mobility; contact guard to min assist with transfers; ambulating with rolling walker and progressing to single point cane; supervision with all ADLs; contact guard assist with toileting; min assist with IADLs. Anticipate 2-3 more weeks LOS. Plan for discharge to home with  and [facility]. Community Care Team will follow up weekly with [facility] until discharge. Medications were not reconciled and general patient assessment was not completed during this [facility] outreach. Michelle Freeman, MSN, RN, ACNS-BC, [facility] Nurse Navigator, [address] [place] [address] [phone]

## 2018-10-30 ENCOUNTER — PATIENT OUTREACH (OUTPATIENT)
Dept: INTERNAL MEDICINE CLINIC | Age: 68
End: 2018-10-30

## 2018-10-30 NOTE — PROGRESS NOTES
10/30/18-NN left message for Jimmie discharge planner at [facility] to call and give me an update on patient's progress and anticipated length of stay. / vs

## 2018-10-31 ENCOUNTER — PATIENT OUTREACH (OUTPATIENT)
Dept: CASE MANAGEMENT | Age: 68
End: 2018-10-31

## 2018-11-06 ENCOUNTER — PATIENT OUTREACH (OUTPATIENT)
Dept: INTERNAL MEDICINE CLINIC | Age: 68
End: 2018-11-06

## 2018-11-06 NOTE — PROGRESS NOTES
11/6/18-NN left message on discharge planner's voice mail , Cristian Tucker, to call back with anticipated length of stay for patient.   She remains in the facility as of this date. / vs

## 2018-11-07 ENCOUNTER — PATIENT OUTREACH (OUTPATIENT)
Dept: CASE MANAGEMENT | Age: 68
End: 2018-11-07

## 2018-11-07 NOTE — PROGRESS NOTES
Community Care Team Documentation for Patient in [facility] Subsequent Follow up Patient remains at [address] ([facility]). See previous [facility] Team notes. PCP : Liliana Swanson MD 
Nurse Navigator in PCP office: Pranav Orr Spoke with SNF team.  PT/OT continue. Currently mod I with bed mobility and transfers; ambulating with cane, rolling walker, or rollator with supervision to mod I; mod I with toileting and upper body ADLs; supervision with lower body ADLs; min assist to contact guard assist with shower transfers. Barriers - anxiety and decreased activity tolerance. Diuretics titrated due to increasing weight. Plan for discharge 11/8 to home with . Patient has declined home health. SNF staff recommended that patient pursue outpatient therapy or cardiac rehab after SNF discharge. PCP office to schedule PCP follow up. Medications were not reconciled and general patient assessment was not completed during this skilled nursing facility outreach. Destiney Freeman, MSN, RN, ACNS-BC, [facility] Nurse Navigator, [address] [place] [address] [phone]

## 2018-11-09 ENCOUNTER — PATIENT OUTREACH (OUTPATIENT)
Dept: INTERNAL MEDICINE CLINIC | Age: 68
End: 2018-11-09

## 2018-11-09 NOTE — PROGRESS NOTES
Hospital Discharge Follow-Up Date/Time:  2018 3:07 PM 
 
Patient was admitted to [facility] on 10/6/18 and discharged on 10/22/18 for acute respiratory failure with hypoxemia due to systolic heart failure. She was then transferred to [facility] and discharged from that facility on 18. The physician discharge summary was available at the time of outreach. Patient was contacted within two business days of discharge from [facility]. Top Challenges reviewed with the provider Patient's  reports blood pressure running in the 885'J systolic. He spoke with cardiologist, and lisinopril increased to 5 mg daily Method of communication with provider :face to face Inpatient RRAT score: 27 Was this a readmission? no  
Patient stated reason for the readmission: n/a Nurse Navigator (NN) contacted the patient by telephone to perform post hospital discharge assessment. Verified name and  with patient as identifiers. Provided introduction to self, and explanation of the Nurse Navigator role. Reviewed discharge instructions and red flags with patient who verbalized understanding. Patient given an opportunity to ask questions and does not have any further questions or concerns at this time. The patient agrees to contact the PCP office for questions related to their healthcare. NN provided contact information for future reference. Disease Specific:   CHF Summary of patient's top problems: 
1. S/P recent hospital stay for respiratory failure due to systolic heart failure. 2. A fib - ICD placed during hospital stay Home Health orders at discharge: none [address]: n/a Date of initial visit: n/a Durable Medical Equipment ordered/company: n/a Durable Medical Equipment received: n/a Barriers to care? depression Advance Care Planning:  
Does patient have an Advance Directive:  reviewed and current Medication(s):  
New Medications at Discharge: [facility] Changed Medications at Discharge: bumex, effexor, lisinopril, metoprolol Discontinued Medications at Discharge: amlodipine, calcium magnesium, premarin, premarin vaginal cream 
 
Medication reconciliation was performed with patient, who verbalizes understanding of administration of home medications. There were no barriers to obtaining medications identified at this time. Referral to Pharm D needed: no  
 
Current Outpatient Medications Medication Sig  clonazePAM (KLONOPIN) 0.5 mg tablet TAKE 1 TABLET EVERY NIGHT. MAX DAILY DOSE OF 0.5MG.  potassium chloride SR (KLOR-CON 10) 10 mEq tablet Take 1 Tab by mouth daily.  bumetanide (BUMEX) 1 mg tablet Take 1 Tab by mouth daily. (Patient taking differently: Take 2 mg by mouth daily.)  lisinopril (PRINIVIL, ZESTRIL) 2.5 mg tablet Take 1 Tab by mouth daily. (Patient taking differently: Take 5 mg by mouth daily.)  metoprolol succinate (TOPROL-XL) 25 mg XL tablet Take 1 Tab by mouth daily. (Patient taking differently: Take 50 mg by mouth daily.)  venlafaxine-SR (EFFEXOR XR) 150 mg capsule Take 150 mg by mouth two (2) times daily (with meals).  dabigatran etexilate (PRADAXA) 150 mg capsule Take 150 mg by mouth two (2) times a day.  pregabalin (LYRICA) 200 mg capsule Take 200 mg by mouth two (2) times a day.  cholecalciferol, vitamin d3, (VITAMIN D3) 400 unit cap Take 400 Units by mouth daily.  aspirin 81 mg chewable tablet Take 81 mg by mouth daily.  atorvastatin (LIPITOR) 40 mg tablet Take 1 Tab by mouth nightly. No current facility-administered medications for this visit. Medications Discontinued During This Encounter Medication Reason  Potassium Gluconate 595 mg (99 mg) tablet Not A Current Medication [facility] follow up appointment(s):  
Future Appointments Date Time Provider Tammy Perales 11/20/2018 10:30 AM Pablito Walls MD [address] [place] [address],[facility], [id number]  
 2/6/2019  8:00 AM Keith STERN MD [address] [place] [address],[facility], [id number] Non-[facility] follow up appointment(s): n/a [facility]:  n/a  
 
 
Goals  monitor and follow treatment plan to help prevent exacerbations of chf   
  11/9/18-NN spoke with patient and her  today. She was recently discharged from [address] [place] [address] for acute respiratory failure with hypoxemia due to systolic heart failure. Patient was then transferred to [facility] and discharged from that facility on 11/8/18. Reviewed with patient and her  importance of weighing and monitoring for swelling and increased shortness of breath. In addition discussed importance of low sodium diet.   She is following medication regimen per cardiologist.  NN will check with her next week to see how she is doing.  / vs

## 2018-11-12 ENCOUNTER — APPOINTMENT (OUTPATIENT)
Dept: ULTRASOUND IMAGING | Age: 68
DRG: 987 | End: 2018-11-12
Attending: EMERGENCY MEDICINE
Payer: MEDICARE

## 2018-11-12 ENCOUNTER — APPOINTMENT (OUTPATIENT)
Dept: GENERAL RADIOLOGY | Age: 68
DRG: 987 | End: 2018-11-12
Attending: EMERGENCY MEDICINE
Payer: MEDICARE

## 2018-11-12 ENCOUNTER — APPOINTMENT (OUTPATIENT)
Dept: NUCLEAR MEDICINE | Age: 68
DRG: 987 | End: 2018-11-12
Attending: EMERGENCY MEDICINE
Payer: MEDICARE

## 2018-11-12 ENCOUNTER — APPOINTMENT (OUTPATIENT)
Dept: CT IMAGING | Age: 68
DRG: 987 | End: 2018-11-12
Attending: EMERGENCY MEDICINE
Payer: MEDICARE

## 2018-11-12 ENCOUNTER — HOSPITAL ENCOUNTER (INPATIENT)
Age: 68
LOS: 6 days | Discharge: HOME | DRG: 987 | End: 2018-11-18
Attending: EMERGENCY MEDICINE | Admitting: HOSPITALIST
Payer: MEDICARE

## 2018-11-12 DIAGNOSIS — I10 ESSENTIAL HYPERTENSION: Chronic | ICD-10-CM

## 2018-11-12 DIAGNOSIS — R10.13 EPIGASTRIC ABDOMINAL PAIN: ICD-10-CM

## 2018-11-12 DIAGNOSIS — R07.9 ACUTE CHEST PAIN: ICD-10-CM

## 2018-11-12 DIAGNOSIS — J96.01 ACUTE RESPIRATORY FAILURE WITH HYPOXIA (HCC): ICD-10-CM

## 2018-11-12 DIAGNOSIS — I48.20 CHRONIC ATRIAL FIBRILLATION (HCC): Chronic | ICD-10-CM

## 2018-11-12 DIAGNOSIS — G44.009 CLUSTER HEADACHE, NOT INTRACTABLE, UNSPECIFIED CHRONICITY PATTERN: ICD-10-CM

## 2018-11-12 DIAGNOSIS — I25.5 ISCHEMIC CARDIOMYOPATHY: ICD-10-CM

## 2018-11-12 DIAGNOSIS — K80.20 GALLSTONES: ICD-10-CM

## 2018-11-12 DIAGNOSIS — N18.30 STAGE 3 CHRONIC KIDNEY DISEASE (HCC): ICD-10-CM

## 2018-11-12 DIAGNOSIS — E87.6 HYPOKALEMIA: ICD-10-CM

## 2018-11-12 DIAGNOSIS — I50.23 ACUTE ON CHRONIC SYSTOLIC CHF (CONGESTIVE HEART FAILURE) (HCC): Primary | ICD-10-CM

## 2018-11-12 DIAGNOSIS — K81.9 CHOLECYSTITIS: ICD-10-CM

## 2018-11-12 LAB
ALBUMIN SERPL-MCNC: 3.2 G/DL (ref 3.5–5)
ALBUMIN/GLOB SERPL: 0.9 {RATIO} (ref 1.1–2.2)
ALP SERPL-CCNC: 161 U/L (ref 45–117)
ALT SERPL-CCNC: 23 U/L (ref 12–78)
ANION GAP SERPL CALC-SCNC: 8 MMOL/L (ref 5–15)
APPEARANCE UR: CLEAR
APTT PPP: 50.7 SEC (ref 22.1–32)
APTT PPP: 61.3 SEC (ref 22.1–32)
AST SERPL-CCNC: 24 U/L (ref 15–37)
ATRIAL RATE: 80 BPM
BACTERIA URNS QL MICRO: NEGATIVE /HPF
BASOPHILS # BLD: 0 K/UL (ref 0–0.1)
BASOPHILS NFR BLD: 0 % (ref 0–1)
BILIRUB SERPL-MCNC: 0.9 MG/DL (ref 0.2–1)
BILIRUB UR QL: NEGATIVE
BNP SERPL-MCNC: 9672 PG/ML (ref 0–125)
BUN SERPL-MCNC: 21 MG/DL (ref 6–20)
BUN/CREAT SERPL: 14 (ref 12–20)
CALCIUM SERPL-MCNC: 8.3 MG/DL (ref 8.5–10.1)
CALCULATED P AXIS, ECG09: 68 DEGREES
CALCULATED R AXIS, ECG10: -119 DEGREES
CALCULATED T AXIS, ECG11: 56 DEGREES
CHLORIDE SERPL-SCNC: 109 MMOL/L (ref 97–108)
CK SERPL-CCNC: 50 U/L (ref 26–192)
CK SERPL-CCNC: 50 U/L (ref 26–192)
CO2 SERPL-SCNC: 29 MMOL/L (ref 21–32)
COLOR UR: ABNORMAL
COMMENT, HOLDF: NORMAL
CREAT SERPL-MCNC: 1.45 MG/DL (ref 0.55–1.02)
D DIMER PPP FEU-MCNC: 0.79 MG/L FEU (ref 0–0.65)
DIAGNOSIS, 93000: NORMAL
DIFFERENTIAL METHOD BLD: ABNORMAL
EOSINOPHIL # BLD: 0.2 K/UL (ref 0–0.4)
EOSINOPHIL NFR BLD: 3 % (ref 0–7)
EPITH CASTS URNS QL MICRO: ABNORMAL /LPF
ERYTHROCYTE [DISTWIDTH] IN BLOOD BY AUTOMATED COUNT: 18.3 % (ref 11.5–14.5)
GLOBULIN SER CALC-MCNC: 3.4 G/DL (ref 2–4)
GLUCOSE BLD STRIP.AUTO-MCNC: 108 MG/DL (ref 65–100)
GLUCOSE BLD STRIP.AUTO-MCNC: 112 MG/DL (ref 65–100)
GLUCOSE BLD STRIP.AUTO-MCNC: 113 MG/DL (ref 65–100)
GLUCOSE SERPL-MCNC: 122 MG/DL (ref 65–100)
GLUCOSE UR STRIP.AUTO-MCNC: NEGATIVE MG/DL
HCT VFR BLD AUTO: 37.5 % (ref 35–47)
HGB BLD-MCNC: 11.6 G/DL (ref 11.5–16)
HGB UR QL STRIP: NEGATIVE
HYALINE CASTS URNS QL MICRO: ABNORMAL /LPF (ref 0–5)
IMM GRANULOCYTES # BLD: 0 K/UL (ref 0–0.04)
IMM GRANULOCYTES NFR BLD AUTO: 1 % (ref 0–0.5)
INR PPP: 1.4 (ref 0.9–1.1)
KETONES UR QL STRIP.AUTO: NEGATIVE MG/DL
LEUKOCYTE ESTERASE UR QL STRIP.AUTO: ABNORMAL
LIPASE SERPL-CCNC: 152 U/L (ref 73–393)
LYMPHOCYTES # BLD: 1.3 K/UL (ref 0.8–3.5)
LYMPHOCYTES NFR BLD: 21 % (ref 12–49)
MAGNESIUM SERPL-MCNC: 1.8 MG/DL (ref 1.6–2.4)
MCH RBC QN AUTO: 28.9 PG (ref 26–34)
MCHC RBC AUTO-ENTMCNC: 30.9 G/DL (ref 30–36.5)
MCV RBC AUTO: 93.3 FL (ref 80–99)
MONOCYTES # BLD: 0.5 K/UL (ref 0–1)
MONOCYTES NFR BLD: 7 % (ref 5–13)
NEUTS SEG # BLD: 4.3 K/UL (ref 1.8–8)
NEUTS SEG NFR BLD: 68 % (ref 32–75)
NITRITE UR QL STRIP.AUTO: NEGATIVE
NRBC # BLD: 0 K/UL (ref 0–0.01)
NRBC BLD-RTO: 0 PER 100 WBC
P-R INTERVAL, ECG05: 216 MS
PH UR STRIP: 6.5 [PH] (ref 5–8)
PLATELET # BLD AUTO: 205 K/UL (ref 150–400)
PMV BLD AUTO: 11.6 FL (ref 8.9–12.9)
POTASSIUM SERPL-SCNC: 2.8 MMOL/L (ref 3.5–5.1)
PROT SERPL-MCNC: 6.6 G/DL (ref 6.4–8.2)
PROT UR STRIP-MCNC: ABNORMAL MG/DL
PROTHROMBIN TIME: 14.4 SEC (ref 9–11.1)
Q-T INTERVAL, ECG07: 484 MS
QRS DURATION, ECG06: 178 MS
QTC CALCULATION (BEZET), ECG08: 558 MS
RBC # BLD AUTO: 4.02 M/UL (ref 3.8–5.2)
RBC #/AREA URNS HPF: ABNORMAL /HPF (ref 0–5)
SAMPLES BEING HELD,HOLD: NORMAL
SERVICE CMNT-IMP: ABNORMAL
SODIUM SERPL-SCNC: 146 MMOL/L (ref 136–145)
SP GR UR REFRACTOMETRY: 1.01 (ref 1–1.03)
THERAPEUTIC RANGE,PTTT: ABNORMAL SECS (ref 58–77)
THERAPEUTIC RANGE,PTTT: ABNORMAL SECS (ref 58–77)
TROPONIN I SERPL-MCNC: 0.06 NG/ML
TROPONIN I SERPL-MCNC: 0.08 NG/ML
TROPONIN I SERPL-MCNC: 0.08 NG/ML
UROBILINOGEN UR QL STRIP.AUTO: 0.2 EU/DL (ref 0.2–1)
VENTRICULAR RATE, ECG03: 80 BPM
WBC # BLD AUTO: 6.3 K/UL (ref 3.6–11)
WBC URNS QL MICRO: ABNORMAL /HPF (ref 0–4)

## 2018-11-12 PROCEDURE — 99285 EMERGENCY DEPT VISIT HI MDM: CPT

## 2018-11-12 PROCEDURE — 99252 IP/OBS CONSLTJ NEW/EST SF 35: CPT | Performed by: SURGERY

## 2018-11-12 PROCEDURE — 85730 THROMBOPLASTIN TIME PARTIAL: CPT

## 2018-11-12 PROCEDURE — 82550 ASSAY OF CK (CPK): CPT

## 2018-11-12 PROCEDURE — 65660000000 HC RM CCU STEPDOWN

## 2018-11-12 PROCEDURE — 81001 URINALYSIS AUTO W/SCOPE: CPT

## 2018-11-12 PROCEDURE — A9540 TC99M MAA: HCPCS

## 2018-11-12 PROCEDURE — 83735 ASSAY OF MAGNESIUM: CPT

## 2018-11-12 PROCEDURE — 74011000250 HC RX REV CODE- 250: Performed by: EMERGENCY MEDICINE

## 2018-11-12 PROCEDURE — 71045 X-RAY EXAM CHEST 1 VIEW: CPT

## 2018-11-12 PROCEDURE — 74011250636 HC RX REV CODE- 250/636: Performed by: EMERGENCY MEDICINE

## 2018-11-12 PROCEDURE — 94761 N-INVAS EAR/PLS OXIMETRY MLT: CPT

## 2018-11-12 PROCEDURE — 94760 N-INVAS EAR/PLS OXIMETRY 1: CPT

## 2018-11-12 PROCEDURE — 83690 ASSAY OF LIPASE: CPT

## 2018-11-12 PROCEDURE — 85025 COMPLETE CBC W/AUTO DIFF WBC: CPT

## 2018-11-12 PROCEDURE — 70450 CT HEAD/BRAIN W/O DYE: CPT

## 2018-11-12 PROCEDURE — 82962 GLUCOSE BLOOD TEST: CPT

## 2018-11-12 PROCEDURE — 96365 THER/PROPH/DIAG IV INF INIT: CPT

## 2018-11-12 PROCEDURE — 76705 ECHO EXAM OF ABDOMEN: CPT

## 2018-11-12 PROCEDURE — 85379 FIBRIN DEGRADATION QUANT: CPT

## 2018-11-12 PROCEDURE — 74011250636 HC RX REV CODE- 250/636: Performed by: HOSPITALIST

## 2018-11-12 PROCEDURE — 74011000250 HC RX REV CODE- 250: Performed by: HOSPITALIST

## 2018-11-12 PROCEDURE — 93005 ELECTROCARDIOGRAM TRACING: CPT

## 2018-11-12 PROCEDURE — 36415 COLL VENOUS BLD VENIPUNCTURE: CPT

## 2018-11-12 PROCEDURE — 96375 TX/PRO/DX INJ NEW DRUG ADDON: CPT

## 2018-11-12 PROCEDURE — 83880 ASSAY OF NATRIURETIC PEPTIDE: CPT

## 2018-11-12 PROCEDURE — 85610 PROTHROMBIN TIME: CPT

## 2018-11-12 PROCEDURE — 74011250637 HC RX REV CODE- 250/637: Performed by: INTERNAL MEDICINE

## 2018-11-12 PROCEDURE — 84484 ASSAY OF TROPONIN QUANT: CPT

## 2018-11-12 PROCEDURE — 74011250637 HC RX REV CODE- 250/637: Performed by: HOSPITALIST

## 2018-11-12 PROCEDURE — 80053 COMPREHEN METABOLIC PANEL: CPT

## 2018-11-12 RX ORDER — BUMETANIDE 0.25 MG/ML
1 INJECTION INTRAMUSCULAR; INTRAVENOUS ONCE
Status: COMPLETED | OUTPATIENT
Start: 2018-11-12 | End: 2018-11-12

## 2018-11-12 RX ORDER — OXYCODONE HYDROCHLORIDE 5 MG/1
5 TABLET ORAL
Status: DISCONTINUED | OUTPATIENT
Start: 2018-11-12 | End: 2018-11-18 | Stop reason: HOSPADM

## 2018-11-12 RX ORDER — ATORVASTATIN CALCIUM 40 MG/1
40 TABLET, FILM COATED ORAL
Status: DISCONTINUED | OUTPATIENT
Start: 2018-11-12 | End: 2018-11-18 | Stop reason: HOSPADM

## 2018-11-12 RX ORDER — BUMETANIDE 0.25 MG/ML
0.5 INJECTION INTRAMUSCULAR; INTRAVENOUS
Status: COMPLETED | OUTPATIENT
Start: 2018-11-12 | End: 2018-11-12

## 2018-11-12 RX ORDER — METOPROLOL SUCCINATE 50 MG/1
100 TABLET, EXTENDED RELEASE ORAL DAILY
Status: DISCONTINUED | OUTPATIENT
Start: 2018-11-13 | End: 2018-11-12

## 2018-11-12 RX ORDER — DOCUSATE SODIUM 100 MG/1
100 CAPSULE, LIQUID FILLED ORAL 2 TIMES DAILY
Status: DISCONTINUED | OUTPATIENT
Start: 2018-11-12 | End: 2018-11-18 | Stop reason: HOSPADM

## 2018-11-12 RX ORDER — LABETALOL HYDROCHLORIDE 5 MG/ML
20 INJECTION, SOLUTION INTRAVENOUS ONCE
Status: COMPLETED | OUTPATIENT
Start: 2018-11-12 | End: 2018-11-12

## 2018-11-12 RX ORDER — ACETAMINOPHEN 325 MG/1
650 TABLET ORAL
Status: DISCONTINUED | OUTPATIENT
Start: 2018-11-12 | End: 2018-11-18 | Stop reason: HOSPADM

## 2018-11-12 RX ORDER — SODIUM CHLORIDE 0.9 % (FLUSH) 0.9 %
5-10 SYRINGE (ML) INJECTION EVERY 8 HOURS
Status: DISCONTINUED | OUTPATIENT
Start: 2018-11-12 | End: 2018-11-18 | Stop reason: HOSPADM

## 2018-11-12 RX ORDER — ONDANSETRON 2 MG/ML
4 INJECTION INTRAMUSCULAR; INTRAVENOUS
Status: COMPLETED | OUTPATIENT
Start: 2018-11-12 | End: 2018-11-12

## 2018-11-12 RX ORDER — PANTOPRAZOLE SODIUM 40 MG/1
40 TABLET, DELAYED RELEASE ORAL
Status: DISCONTINUED | OUTPATIENT
Start: 2018-11-13 | End: 2018-11-18 | Stop reason: HOSPADM

## 2018-11-12 RX ORDER — DEXTROSE 50 % IN WATER (D50W) INTRAVENOUS SYRINGE
12.5-25 AS NEEDED
Status: DISCONTINUED | OUTPATIENT
Start: 2018-11-12 | End: 2018-11-18 | Stop reason: HOSPADM

## 2018-11-12 RX ORDER — LABETALOL HYDROCHLORIDE 5 MG/ML
10 INJECTION, SOLUTION INTRAVENOUS
Status: DISCONTINUED | OUTPATIENT
Start: 2018-11-12 | End: 2018-11-18 | Stop reason: HOSPADM

## 2018-11-12 RX ORDER — POTASSIUM CHLORIDE 750 MG/1
40 TABLET, FILM COATED, EXTENDED RELEASE ORAL EVERY 6 HOURS
Status: COMPLETED | OUTPATIENT
Start: 2018-11-12 | End: 2018-11-12

## 2018-11-12 RX ORDER — CLONAZEPAM 0.5 MG/1
0.5 TABLET ORAL
Status: DISCONTINUED | OUTPATIENT
Start: 2018-11-12 | End: 2018-11-18 | Stop reason: HOSPADM

## 2018-11-12 RX ORDER — POTASSIUM CHLORIDE 750 MG/1
20 TABLET, FILM COATED, EXTENDED RELEASE ORAL DAILY
Status: DISCONTINUED | OUTPATIENT
Start: 2018-11-13 | End: 2018-11-18 | Stop reason: HOSPADM

## 2018-11-12 RX ORDER — LEVOFLOXACIN 5 MG/ML
750 INJECTION, SOLUTION INTRAVENOUS
Status: DISCONTINUED | OUTPATIENT
Start: 2018-11-12 | End: 2018-11-15

## 2018-11-12 RX ORDER — FENTANYL CITRATE 50 UG/ML
50 INJECTION, SOLUTION INTRAMUSCULAR; INTRAVENOUS
Status: COMPLETED | OUTPATIENT
Start: 2018-11-12 | End: 2018-11-12

## 2018-11-12 RX ORDER — METOPROLOL SUCCINATE 50 MG/1
100 TABLET, EXTENDED RELEASE ORAL DAILY
Status: DISCONTINUED | OUTPATIENT
Start: 2018-11-12 | End: 2018-11-18 | Stop reason: HOSPADM

## 2018-11-12 RX ORDER — POTASSIUM CHLORIDE 7.45 MG/ML
10 INJECTION INTRAVENOUS
Status: COMPLETED | OUTPATIENT
Start: 2018-11-12 | End: 2018-11-12

## 2018-11-12 RX ORDER — HEPARIN SODIUM 5000 [USP'U]/ML
4000 INJECTION, SOLUTION INTRAVENOUS; SUBCUTANEOUS AS NEEDED
Status: DISCONTINUED | OUTPATIENT
Start: 2018-11-12 | End: 2018-11-15

## 2018-11-12 RX ORDER — LISINOPRIL 5 MG/1
10 TABLET ORAL DAILY
Status: DISCONTINUED | OUTPATIENT
Start: 2018-11-13 | End: 2018-11-17

## 2018-11-12 RX ORDER — HEPARIN SODIUM 5000 [USP'U]/ML
30 INJECTION, SOLUTION INTRAVENOUS; SUBCUTANEOUS AS NEEDED
Status: DISCONTINUED | OUTPATIENT
Start: 2018-11-12 | End: 2018-11-12

## 2018-11-12 RX ORDER — INSULIN LISPRO 100 [IU]/ML
INJECTION, SOLUTION INTRAVENOUS; SUBCUTANEOUS
Status: DISCONTINUED | OUTPATIENT
Start: 2018-11-12 | End: 2018-11-18 | Stop reason: HOSPADM

## 2018-11-12 RX ORDER — SODIUM CHLORIDE 0.9 % (FLUSH) 0.9 %
5-10 SYRINGE (ML) INJECTION AS NEEDED
Status: DISCONTINUED | OUTPATIENT
Start: 2018-11-12 | End: 2018-11-18 | Stop reason: HOSPADM

## 2018-11-12 RX ORDER — HEPARIN SODIUM 10000 [USP'U]/100ML
9.1-25 INJECTION, SOLUTION INTRAVENOUS
Status: DISCONTINUED | OUTPATIENT
Start: 2018-11-12 | End: 2018-11-15

## 2018-11-12 RX ORDER — LISINOPRIL 5 MG/1
5 TABLET ORAL DAILY
Status: DISCONTINUED | OUTPATIENT
Start: 2018-11-12 | End: 2018-11-12

## 2018-11-12 RX ORDER — NITROGLYCERIN 20 MG/100ML
0-200 INJECTION INTRAVENOUS
Status: DISCONTINUED | OUTPATIENT
Start: 2018-11-12 | End: 2018-11-12

## 2018-11-12 RX ORDER — HYDRALAZINE HYDROCHLORIDE 20 MG/ML
10 INJECTION INTRAMUSCULAR; INTRAVENOUS
Status: DISCONTINUED | OUTPATIENT
Start: 2018-11-12 | End: 2018-11-18 | Stop reason: HOSPADM

## 2018-11-12 RX ORDER — ONDANSETRON 2 MG/ML
4 INJECTION INTRAMUSCULAR; INTRAVENOUS
Status: DISCONTINUED | OUTPATIENT
Start: 2018-11-12 | End: 2018-11-18 | Stop reason: HOSPADM

## 2018-11-12 RX ORDER — MAGNESIUM SULFATE 100 %
4 CRYSTALS MISCELLANEOUS AS NEEDED
Status: DISCONTINUED | OUTPATIENT
Start: 2018-11-12 | End: 2018-11-18 | Stop reason: HOSPADM

## 2018-11-12 RX ORDER — HEPARIN SODIUM 5000 [USP'U]/ML
2000 INJECTION, SOLUTION INTRAVENOUS; SUBCUTANEOUS AS NEEDED
Status: DISCONTINUED | OUTPATIENT
Start: 2018-11-12 | End: 2018-11-15

## 2018-11-12 RX ORDER — FENTANYL CITRATE 50 UG/ML
50 INJECTION, SOLUTION INTRAMUSCULAR; INTRAVENOUS
Status: DISCONTINUED | OUTPATIENT
Start: 2018-11-12 | End: 2018-11-18 | Stop reason: HOSPADM

## 2018-11-12 RX ORDER — METRONIDAZOLE 500 MG/100ML
500 INJECTION, SOLUTION INTRAVENOUS EVERY 8 HOURS
Status: DISCONTINUED | OUTPATIENT
Start: 2018-11-12 | End: 2018-11-13

## 2018-11-12 RX ORDER — BUMETANIDE 0.25 MG/ML
1 INJECTION INTRAMUSCULAR; INTRAVENOUS 2 TIMES DAILY
Status: DISCONTINUED | OUTPATIENT
Start: 2018-11-12 | End: 2018-11-16

## 2018-11-12 RX ORDER — LISINOPRIL 5 MG/1
5 TABLET ORAL DAILY
Status: DISCONTINUED | OUTPATIENT
Start: 2018-11-13 | End: 2018-11-12

## 2018-11-12 RX ORDER — VENLAFAXINE HYDROCHLORIDE 150 MG/1
150 CAPSULE, EXTENDED RELEASE ORAL 2 TIMES DAILY WITH MEALS
Status: DISCONTINUED | OUTPATIENT
Start: 2018-11-12 | End: 2018-11-18 | Stop reason: HOSPADM

## 2018-11-12 RX ADMIN — METOPROLOL SUCCINATE 100 MG: 50 TABLET, EXTENDED RELEASE ORAL at 12:47

## 2018-11-12 RX ADMIN — ATORVASTATIN CALCIUM 40 MG: 40 TABLET, FILM COATED ORAL at 20:33

## 2018-11-12 RX ADMIN — LISINOPRIL 5 MG: 5 TABLET ORAL at 12:46

## 2018-11-12 RX ADMIN — HEPARIN SODIUM 9.1 UNITS/KG/HR: 10000 INJECTION, SOLUTION INTRAVENOUS at 13:23

## 2018-11-12 RX ADMIN — POTASSIUM CHLORIDE 40 MEQ: 750 TABLET, FILM COATED, EXTENDED RELEASE ORAL at 11:37

## 2018-11-12 RX ADMIN — BUMETANIDE 0.5 MG: 0.25 INJECTION INTRAMUSCULAR; INTRAVENOUS at 08:49

## 2018-11-12 RX ADMIN — ONDANSETRON 4 MG: 2 INJECTION INTRAMUSCULAR; INTRAVENOUS at 07:33

## 2018-11-12 RX ADMIN — BUMETANIDE 1 MG: 0.25 INJECTION INTRAMUSCULAR; INTRAVENOUS at 10:52

## 2018-11-12 RX ADMIN — DOCUSATE SODIUM 100 MG: 100 CAPSULE, LIQUID FILLED ORAL at 11:38

## 2018-11-12 RX ADMIN — LABETALOL HYDROCHLORIDE 20 MG: 5 INJECTION INTRAVENOUS at 11:38

## 2018-11-12 RX ADMIN — METRONIDAZOLE 500 MG: 500 INJECTION, SOLUTION INTRAVENOUS at 11:45

## 2018-11-12 RX ADMIN — LIDOCAINE HYDROCHLORIDE 40 ML: 20 SOLUTION ORAL; TOPICAL at 12:46

## 2018-11-12 RX ADMIN — CLONAZEPAM 0.5 MG: 0.5 TABLET ORAL at 20:33

## 2018-11-12 RX ADMIN — FENTANYL CITRATE 50 MCG: 50 INJECTION, SOLUTION INTRAMUSCULAR; INTRAVENOUS at 07:34

## 2018-11-12 RX ADMIN — ACETAMINOPHEN 650 MG: 325 TABLET ORAL at 15:39

## 2018-11-12 RX ADMIN — Medication 10 ML: at 18:46

## 2018-11-12 RX ADMIN — PREGABALIN 200 MG: 150 CAPSULE ORAL at 18:45

## 2018-11-12 RX ADMIN — METRONIDAZOLE 500 MG: 500 INJECTION, SOLUTION INTRAVENOUS at 20:29

## 2018-11-12 RX ADMIN — DOCUSATE SODIUM 100 MG: 100 CAPSULE, LIQUID FILLED ORAL at 18:45

## 2018-11-12 RX ADMIN — PREGABALIN 200 MG: 150 CAPSULE ORAL at 11:38

## 2018-11-12 RX ADMIN — POTASSIUM CHLORIDE 40 MEQ: 750 TABLET, FILM COATED, EXTENDED RELEASE ORAL at 22:21

## 2018-11-12 RX ADMIN — POTASSIUM CHLORIDE 40 MEQ: 750 TABLET, FILM COATED, EXTENDED RELEASE ORAL at 18:45

## 2018-11-12 RX ADMIN — Medication 10 ML: at 20:34

## 2018-11-12 RX ADMIN — LEVOFLOXACIN 750 MG: 5 INJECTION, SOLUTION INTRAVENOUS at 12:46

## 2018-11-12 RX ADMIN — NITROGLYCERIN 1 INCH: 20 OINTMENT TOPICAL at 18:45

## 2018-11-12 RX ADMIN — BUMETANIDE 1 MG: 0.25 INJECTION INTRAMUSCULAR; INTRAVENOUS at 15:39

## 2018-11-12 RX ADMIN — VENLAFAXINE HYDROCHLORIDE 150 MG: 150 CAPSULE, EXTENDED RELEASE ORAL at 18:45

## 2018-11-12 RX ADMIN — POTASSIUM CHLORIDE 10 MEQ: 10 INJECTION, SOLUTION INTRAVENOUS at 08:00

## 2018-11-12 NOTE — ED NOTES
Discussed with pharmacist to start heparin at 9. 1units/kg/hr. Pharmacist wants to start heparin based on morning ptt and redraw 6 hours after start. Spoke with Dr. Dolores Nissen wants to weight on starting nitro drip b/c BP currently controlled with labetolol.

## 2018-11-12 NOTE — ED NOTES
Bedside and Verbal shift change report given to Tati RN (oncoming nurse) by Maninder Sanchez RN (offgoing nurse). Report included the following information SBAR, Kardex, ED Summary, Intake/Output, MAR, Recent Results and Med Rec Status.

## 2018-11-12 NOTE — PROGRESS NOTES
Pharmacy Automatic Direct Oral Anticoagulant Renal Dosing Protocol Patient currently receiving Dabigatran 150 mg bid with an indication of AFib. Start date: Dabigatran 150mg BID, last dose 11/11 pm 
 
Major interacting medications: - 
 
Platelet inhibitors (dose/frequency): - 
 
Labs: 
Recent Labs 11/12/18 
4522 CREA 1.45* HGB 11.6  Wt Readings from Last 1 Encounters:  
11/12/18 110.1 kg (242 lb 11.6 oz) Creatinine Clearance:  
Estimated Creatinine Clearance: 50.5 mL/min (A) (based on SCr of 1.45 mg/dL (H)). Estimated Creatinine Clearance (using IBW):39.9 mL/min Impression/Plan:  
Recommended wash out time from Dabigatran is 3-5 days with CrCl < 50mL/min. Discussed transition to Lovenox q12h beginning 11/12 am however considering patient's existing CrCl borderline for q24h Lovenox dosing, recommended Heparin gtt with stop Heparin gtt 6 hours before surgery. Pt with planned surgery Thursday Pharmacy will follow daily and adjust as appropriate.  
 
Thank you, 
Vera Macdonald, [facility]

## 2018-11-12 NOTE — CONSULTS
Surgery Consult    Subjective:      Bhavik Jameson is a 79 y.o. female who presents for evaluation of substernal and epigastric pain radiating to the mid back. The pain is described as sharp and stabbing and is 8/10 in intensity. She has some room air hypoxia and is recently s/p VQ scan to r/o PE. This is very low probability per reading. Pt is 11 pounds up from discharge weight last hospitalization after she had her pacemaker changed last month. Her BNP is 9672. Pt has a h/o cholecystitis per history, and RUQ U/S today shows mild GB wall thickening with cholelithiasis and some tenderness with probe palpation. She has a normal WBC. She is on Pradaxa and has some CRI with Cr 1.45.     Past Medical History:   Diagnosis Date    Anxiety 1/22/2018    Arthritis     OA    Asthma     Diabetes ([address]     Essential hypertension     GERD (gastroesophageal reflux disease)     Hypercholesterolemia 1/22/2018    Hypertension     Long-term use of high-risk medication 1/22/2018    Neuropathy     Other ill-defined conditions(799.89)     IBS, spinal stenosis    Plantar fasciitis     Psychiatric disorder     depression, anxiety    Sleep apnea     uses CPAP    Type 2 diabetes mellitus with diabetic neuropathy, without long-term current use of insulin ([address] 6/5/2016     Past Surgical History:   Procedure Laterality Date    CARDIAC SURG PROCEDURE UNLIST      stent    HX APPENDECTOMY      HX HYSTERECTOMY      HX PACEMAKER        Family History   Problem Relation Age of Onset    Arthritis-osteo Mother     Hypertension Mother     High Cholesterol Mother     Crohn's Disease Mother     Heart Disease Mother     Alcohol abuse Father     High Cholesterol Sister     Hypertension Sister     Thyroid Disease Sister     COPD Sister     High Cholesterol Brother     Hypertension Brother     COPD Brother     COPD Child     Inflammatory Bowel Dz Child      Social History     Socioeconomic History    Marital status:      Spouse name: Not on file    Number of children: Not on file    Years of education: Not on file    Highest education level: Not on file   Social Needs    Financial resource strain: Not on file    Food insecurity - worry: Not on file    Food insecurity - inability: Not on file    Transportation needs - medical: Not on file   [employer] needs - non-medical: Not on file   Occupational History    Not on file   Tobacco Use    Smoking status: Never Smoker    Smokeless tobacco: Never Used   Substance and Sexual Activity    Alcohol use: No    Drug use: No    Sexual activity: Not on file   Other Topics Concern    Not on file   Social History Narrative    Not on file      Current Facility-Administered Medications   Medication Dose Route Frequency Provider Last Rate Last Dose    sodium chloride (NS) flush 5-10 mL  5-10 mL IntraVENous Q8H Rosita Mason MD        sodium chloride (NS) flush 5-10 mL  5-10 mL IntraVENous PRN Rosita Mason MD        acetaminophen (TYLENOL) tablet 650 mg  650 mg Oral Q6H PRN Rosita Mason MD        fentaNYL citrate (PF) injection 50 mcg  50 mcg IntraVENous Q6H PRN Rosita Mason MD        levoFLOXacin (LEVAQUIN) 750 mg in D5W IVPB  750 mg IntraVENous Q48H Rosita Mason MD        metroNIDAZOLE (FLAGYL) IVPB premix 500 mg  500 mg IntraVENous Q8H Rosita Mason  mL/hr at 11/12/18 1145 500 mg at 11/12/18 1145    oxyCODONE IR (ROXICODONE) tablet 5 mg  5 mg Oral Q6H PRN Rosita Mason MD        ondansetron [facility]) injection 4 mg  4 mg IntraVENous Q6H PRN Rosita Mason MD        docusate sodium (COLACE) capsule 100 mg  100 mg Oral BID Rosita Mason MD   100 mg at 11/12/18 1138    potassium chloride SR (KLOR-CON 10) tablet 40 mEq  40 mEq Oral Q6H Rosita Mason MD   40 mEq at 11/12/18 1137    atorvastatin (LIPITOR) tablet 40 mg  40 mg Oral QHS Rosita Mason MD        clonazePAM (KlonoPIN) tablet 0.5 mg  0.5 mg Oral QHS Rosita Mason MD  [START ON 11/13/2018] potassium chloride SR (KLOR-CON 10) tablet 20 mEq  20 mEq Oral DAILY Anabel Ackerman MD        pregabalin (LYRICA) capsule 200 mg  200 mg Oral BID Anabel Ackerman MD   200 mg at 11/12/18 1138    venlafaxine-SR (EFFEXOR-XR) capsule 150 mg  150 mg Oral BID WITH MEALS Anabel Ackerman MD        nitroglycerin (Tridil) 200 mcg/ml infusion  0-200 mcg/min IntraVENous TITRATE Anabel Ackerman MD        lisinopril (PRINIVIL, ZESTRIL) tablet 5 mg  5 mg Oral DAILY Anabel Ackerman MD        metoprolol succinate (TOPROL-XL) XL tablet 100 mg  100 mg Oral DAILY Anabel Ackerman MD        bumetanide [facility]) injection 1 mg  1 mg IntraVENous BID Anabel Ackerman MD        heparin 25,000 units in D5W 250 ml infusion  9.1-25 Units/kg/hr IntraVENous TITRATE Anabel Ackerman MD        mylanta/viscous lidocaine (GI COCKTAIL)  40 mL Oral Jorge Gamino MD        [START ON 11/13/2018] pantoprazole (PROTONIX) tablet 40 mg  40 mg Oral ACB Anabel Ackerman MD         Current Outpatient Medications   Medication Sig Dispense Refill    clonazePAM (KLONOPIN) 0.5 mg tablet TAKE 1 TABLET EVERY NIGHT. MAX DAILY DOSE OF 0.5MG. 30 Tab 0    potassium chloride SR (KLOR-CON 10) 10 mEq tablet Take 1 Tab by mouth daily. 30 Tab 0    bumetanide (BUMEX) 1 mg tablet Take 1 Tab by mouth daily. (Patient taking differently: Take 2 mg by mouth daily.) 30 Tab 11    lisinopril (PRINIVIL, ZESTRIL) 2.5 mg tablet Take 1 Tab by mouth daily. (Patient taking differently: Take 5 mg by mouth daily.) 30 Tab 12    metoprolol succinate (TOPROL-XL) 25 mg XL tablet Take 1 Tab by mouth daily. (Patient taking differently: Take 50 mg by mouth daily.) 30 Tab 12    venlafaxine-SR (EFFEXOR XR) 150 mg capsule Take 150 mg by mouth two (2) times daily (with meals).  dabigatran etexilate (PRADAXA) 150 mg capsule Take 150 mg by mouth two (2) times a day.  pregabalin (LYRICA) 200 mg capsule Take 200 mg by mouth two (2) times a day.       cholecalciferol, vitamin d3, (VITAMIN D3) 400 unit cap Take 400 Units by mouth daily.  aspirin 81 mg chewable tablet Take 81 mg by mouth daily.  atorvastatin (LIPITOR) 40 mg tablet Take 1 Tab by mouth nightly. 30 Tab 12        Allergies   Allergen Reactions    Sulfa (Sulfonamide Antibiotics) Swelling    Amoxicillin Swelling       Review of Systems:  Pertinent items are noted in the History of Present Illness. Objective:        Patient Vitals for the past 8 hrs:   BP Temp Pulse Resp SpO2 Height Weight   18 1115 (!) 184/113  80 21 95 %     18 1052 (!) 160/109  80  96 %     18 0930 (!) 178/97  80 15 95 %     18 0900 (!) 178/105  80 12 95 %     18 0849 (!) 183/107  80       18 0815 (!) 165/99  80 21 93 %     18 0814 (!) 165/99 97.5 °F (36.4 °C) 80 18 100 %     18 0727     93 %     18 0715 (!) 186/108  80 18 93 %     18 0616 (!) 166/120 97.8 °F (36.6 °C) 80 20 93 % 5' 9\" (1.753 m) 242 lb 11.6 oz (110.1 kg)       Temp (24hrs), Av.7 °F (36.5 °C), Min:97.5 °F (36.4 °C), Max:97.8 °F (36.6 °C)      Physical Exam:  GENERAL: alert, cooperative, no distress, appears stated age, LUNG: scattered rales, Healing fresh pacemaker replacement scar L anterior chest, HEART: regular rate and rhythm, ABDOMEN: soft, non-distended, tender RUQ. Bowel sounds normal. No masses,  no organomegaly, EXTREMITIES:  edema 3+ bilat LEs    Assessment:     Hospital Problems  Date Reviewed: 2018          Codes Class Noted POA    Acute on chronic systolic CHF (congestive heart failure) ([facility] [address] ICD-10-CM: I50.23  ICD-9-CM: 428.23, 428.0  2018 Unknown        Hypokalemia ICD-10-CM: E87.6  ICD-9-CM: 276.8  10/7/2018 Unknown              Plan:     Pt with hypoxia and chest/upper abdominal pain. VQ scan low probability for PE. Suspect related to significant CHF.   Also some degree of RUQ tenderness and U/S confirmed cholelithiasis which is longstanding. Normal WBC. Unclear if this is acute cholecystitis. Will need Hida scan but we have to wait 24 hours after the VQ scan. Hold Pradaxa for now and use lovenox. If Hida scan confirms cholecystitis, will need to wait 4 days due to Pradaxa with impaired Cr clearance and high risk of bleeding in setting of cholecystitis. Would cover with Levaquin and Flagyl until Hida rules out cholecystitis, as pt is PCN allergic.   Will follow along with primary team.    Signed By: Keely Lunsford MD, [facility]     November 12, 2018

## 2018-11-12 NOTE — ED NOTES
Bedside and Verbal shift change report given to Chloé Marcos RN  (oncoming nurse) by Carrie Moore RN  (offgoing nurse). Report included the following information SBAR, Kardex, ED Summary, Intake/Output, MAR, Recent Results and Med Rec Status.

## 2018-11-12 NOTE — ED PROVIDER NOTES
EMERGENCY DEPARTMENT HISTORY AND PHYSICAL EXAM 
 
 
Date: 11/12/2018 Patient Name: Priya Flanagan History of Presenting Illness Chief Complaint Patient presents with  Shortness of Breath Became SOB last night . Patient wears CPAP at night and wore it but it didn't help. History Provided By: Patient, Patient's  and EMS 
 
HPI: Priya Flanagan, 79 y.o. female with PMHx significant for HTN, asthma, depression, anxiety, IBS, spinal stenosis, GERD, DM, hypercholesterolemia, SHAYLA on CPAP, presents via EMS to the ED with cc of acute on chronic SOB, 8/10 HA and nausea with associated diffuse abd pain, mid thoracic back pain and 8/10 substernal CP x last night. She reports that her CP and SOB began after taking her CPAP off last night, which she wears nightly for her SHAYLA. She has had this pain in the past and was dxd with cholecystitis. Pt states that she has also had similar HA in the past with dx of cluster HA. Usually when she gets these HA, her BP is up and her  reports that it has been elevated this past week. Pt recently had her pacemaker replaced, so her BP med dosages were reduced in preparation for the procedure. Pt came home from this procedure on 11/08/18 after an 18 day stay in rehab. Per EMS, her BP was 154/112 and her O2 sats were 94% on RA en route to the ED. Pt denies blurred vision, neck pain, numbness, tingling, vomiting, fever, chills, diaphoresis, BLE pain. Pt does not smoke or drink. She is currently anticoagulated on Pradaxa. She is followed by Dr. Lupillo Rivera and Dr. Glorai Rodriguez, cardiology. Denies any pertinent familial medical hx. There are no other complaints, changes, or physical findings at this time. PCP: Noelle Meadows MD 
 
Current Facility-Administered Medications Medication Dose Route Frequency Provider Last Rate Last Dose  sodium chloride (NS) flush 5-10 mL  5-10 mL IntraVENous Q8H Melissa Reynoso MD      
  sodium chloride (NS) flush 5-10 mL  5-10 mL IntraVENous PRN Jane Eli MD      
 acetaminophen (TYLENOL) tablet 650 mg  650 mg Oral Q6H PRN Jane Eli MD      
 fentaNYL citrate (PF) injection 50 mcg  50 mcg IntraVENous Q6H PRN Jane Eli MD      
 levoFLOXacin (LEVAQUIN) 750 mg in D5W IVPB  750 mg IntraVENous Q48H Jane Eli  mL/hr at 11/12/18 1246 750 mg at 11/12/18 1246  
 metroNIDAZOLE (FLAGYL) IVPB premix 500 mg  500 mg IntraVENous Q8H Jane Eli  mL/hr at 11/12/18 1145 500 mg at 11/12/18 1145  
 oxyCODONE IR (ROXICODONE) tablet 5 mg  5 mg Oral Q6H PRN Jane Eli MD      
 ondansetron [facility]) injection 4 mg  4 mg IntraVENous Q6H PRN Jane Eli MD      
 docusate sodium (COLACE) capsule 100 mg  100 mg Oral BID Jane Eli MD   100 mg at 11/12/18 1138  potassium chloride SR (KLOR-CON 10) tablet 40 mEq  40 mEq Oral Q6H Jane Eli MD   40 mEq at 11/12/18 1137  
 atorvastatin (LIPITOR) tablet 40 mg  40 mg Oral QHS Jane Eli MD      
 clonazePAM (KlonoPIN) tablet 0.5 mg  0.5 mg Oral QHS Jane Eli MD      
 [START ON 11/13/2018] potassium chloride SR (KLOR-CON 10) tablet 20 mEq  20 mEq Oral DAILY Jane Eli MD      
 pregabalin (LYRICA) capsule 200 mg  200 mg Oral BID Jane Eli MD   200 mg at 11/12/18 1138  
 venlafaxine-SR (EFFEXOR-XR) capsule 150 mg  150 mg Oral BID WITH MEALS Jane Eli MD      
 nitroglycerin (Tridil) 200 mcg/ml infusion  0-200 mcg/min IntraVENous TITRATE Jane Eli MD      
 lisinopril (PRINIVIL, ZESTRIL) tablet 5 mg  5 mg Oral DAILY Jane Eli MD   5 mg at 11/12/18 1246  
 metoprolol succinate (TOPROL-XL) XL tablet 100 mg  100 mg Oral DAILY Jane Eli MD   100 mg at 11/12/18 1247  bumetanide (BUMEX) injection 1 mg  1 mg IntraVENous BID Jane Eli MD      
 heparin 25,000 units in D5W 250 ml infusion  9.1-25 Units/kg/hr IntraVENous TITRATE Margret Gasca MD 10 mL/hr at 11/12/18 1323 9.1 Units/kg/hr at 11/12/18 1323  
 [START ON 11/13/2018] pantoprazole (PROTONIX) tablet 40 mg  40 mg Oral ACB Margret Gasca MD      
 heparin (porcine) injection 4,000 Units  4,000 Units IntraVENous PRN Margret Gasca MD      
 heparin (porcine) injection 2,000 Units  2,000 Units IntraVENous PRN Margret Gasca MD      
 insulin lispro (HUMALOG) injection   SubCUTAneous AC&HS Margret Gasca MD      
 glucose chewable tablet 16 g  4 Tab Oral PRN Margret Gasca MD      
 dextrose (D50W) injection syrg 12.5-25 g  12.5-25 g IntraVENous PRN Margret Gasca MD      
 glucagon [facility]) injection 1 mg  1 mg IntraMUSCular PRN Margret Gasca MD      
 
Current Outpatient Medications Medication Sig Dispense Refill  clonazePAM (KLONOPIN) 0.5 mg tablet TAKE 1 TABLET EVERY NIGHT. MAX DAILY DOSE OF 0.5MG. 30 Tab 0  
 potassium chloride SR (KLOR-CON 10) 10 mEq tablet Take 1 Tab by mouth daily. 30 Tab 0  
 bumetanide (BUMEX) 1 mg tablet Take 1 Tab by mouth daily. (Patient taking differently: Take 2 mg by mouth daily.) 30 Tab 11  
 lisinopril (PRINIVIL, ZESTRIL) 2.5 mg tablet Take 1 Tab by mouth daily. (Patient taking differently: Take 5 mg by mouth daily.) 30 Tab 12  
 metoprolol succinate (TOPROL-XL) 25 mg XL tablet Take 1 Tab by mouth daily. (Patient taking differently: Take 50 mg by mouth daily.) 30 Tab 12  
 venlafaxine-SR (EFFEXOR XR) 150 mg capsule Take 150 mg by mouth two (2) times daily (with meals).  dabigatran etexilate (PRADAXA) 150 mg capsule Take 150 mg by mouth two (2) times a day.  pregabalin (LYRICA) 200 mg capsule Take 200 mg by mouth two (2) times a day.  cholecalciferol, vitamin d3, (VITAMIN D3) 400 unit cap Take 400 Units by mouth daily.  aspirin 81 mg chewable tablet Take 81 mg by mouth daily.  atorvastatin (LIPITOR) 40 mg tablet Take 1 Tab by mouth nightly. 30 Tab 12 Past History Past Medical History: 
Past Medical History:  
Diagnosis Date  Anxiety 1/22/2018  Arthritis OA  Asthma  Diabetes ([facility] [address]  Essential hypertension  GERD (gastroesophageal reflux disease)  Hypercholesterolemia 1/22/2018  Hypertension  Long-term use of high-risk medication 1/22/2018  Neuropathy  Other ill-defined conditions(799.89) IBS, spinal stenosis  Plantar fasciitis  Psychiatric disorder   
 depression, anxiety  Sleep apnea   
 uses CPAP  Type 2 diabetes mellitus with diabetic neuropathy, without long-term current use of insulin ([facility] [address] 6/5/2016 Past Surgical History: 
Past Surgical History:  
Procedure Laterality Date  CARDIAC SURG PROCEDURE UNLIST    
 stent  HX APPENDECTOMY  HX HYSTERECTOMY  HX PACEMAKER Family History: 
Family History Problem Relation Age of Onset 26 [facility] Arthritis-osteo Mother  Hypertension Mother  High Cholesterol Mother  Crohn's Disease Mother  Heart Disease Mother  Alcohol abuse Father  High Cholesterol Sister  Hypertension Sister  Thyroid Disease Sister  COPD Sister  High Cholesterol Brother  Hypertension Brother  COPD Brother  COPD Child  Inflammatory Bowel Dz Child Social History: 
Social History Tobacco Use  Smoking status: Never Smoker  Smokeless tobacco: Never Used Substance Use Topics  Alcohol use: No  
 Drug use: No  
 
 
Allergies: Allergies Allergen Reactions  Sulfa (Sulfonamide Antibiotics) Swelling  Amoxicillin Swelling Review of Systems Review of Systems Constitutional: Negative for chills, diaphoresis and fever. HENT: Negative for congestion. Eyes: Negative. Negative for visual disturbance. Respiratory: Positive for shortness of breath. Cardiovascular: Positive for chest pain (substernal). Gastrointestinal: Positive for abdominal pain (diffuse) and nausea. Negative for vomiting. Endocrine: Negative for heat intolerance. Genitourinary: Negative for dysuria. Musculoskeletal: Positive for back pain (mid thoracic). Negative for myalgias (BLE pain) and neck pain. Skin: Negative for rash. Allergic/Immunologic: Negative for immunocompromised state. Neurological: Positive for headaches. Negative for numbness (or tingling). Hematological: Does not bruise/bleed easily. Psychiatric/Behavioral: Negative. All other systems reviewed and are negative. Physical Exam  
Physical Exam  
Constitutional: She is oriented to person, place, and time. She appears well-developed. She appears distressed (moderate). Elevated BMI HENT:  
Head: Normocephalic and atraumatic. Eyes: EOM are normal. Pupils are equal, round, and reactive to light. Neck: Normal range of motion. Neck supple. Cervical tenderness Cardiovascular: Normal rate, regular rhythm, normal heart sounds and intact distal pulses. Pulmonary/Chest: Effort normal and breath sounds normal. No respiratory distress. She exhibits tenderness (non-reproducible). Abdominal: Soft. Bowel sounds are normal. She exhibits no mass. There is tenderness (diffuse abd tenderness except for RLQ). Genitourinary: Rectal exam shows guaiac positive stool. Musculoskeletal: Normal range of motion. She exhibits edema (2+ non-pitting edema BLE). No calf tenderness Neurological: She is alert and oriented to person, place, and time. Coordination normal.  
Skin: Skin is warm and dry. Psychiatric: She has a normal mood and affect. Her behavior is normal.  
Nursing note and vitals reviewed. Diagnostic Study Results Labs - Radiologic Studies -  
NM LUNG VENT/PERF Final Result US ABD LTD Final Result CT HEAD WO CONT Final Result XR CHEST PORT Final Result CT Results  (Last 48 hours)  
          
 11/12/18 0744  CT HEAD WO CONT Final result Impression:  IMPRESSION: No acute abnormality. Narrative:  EXAM:  CT HEAD WO CONT INDICATION:   headache COMPARISON: None. CONTRAST:  None. TECHNIQUE: Unenhanced CT of the head was performed using 5 mm images. Brain and  
bone windows were generated. CT dose reduction was achieved through use of a  
standardized protocol tailored for this examination and automatic exposure  
control for dose modulation. FINDINGS:  
The ventricles and sulci are normal in size, shape and configuration and  
midline. Mild small vessel ischemic changes are seen in the periventricular  
white matter. There is no intracranial hemorrhage, extra-axial collection, mass,  
mass effect or midline shift. The basilar cisterns are open. No acute infarct  
is identified. The bone windows demonstrate no abnormalities. The visualized  
portions of the paranasal sinuses and mastoid air cells are clear. CXR Results  (Last 48 hours)  
          
 11/12/18 0724  XR CHEST PORT Final result Impression:  IMPRESSION:  
Small right pleural effusion suspected. No other evidence of acute finding. Narrative:  INDICATION: Shortness of breath COMPARISON: 10/18/2018 A single frontal view was obtained. The time of this study is 0704 hours. Small  
right pleural effusion is suspected. Lungs are otherwise clear. Heart and  
mediastinal shadows are stable. Cardiac pacemaker again noted. Minal [place]wood Medical Decision Making I am the first provider for this patient. I reviewed the vital signs, available nursing notes, past medical history, past surgical history, family history and social history. Vital Signs-Reviewed the patient's vital signs. Patient Vitals for the past 12 hrs: 
 Temp Pulse Resp BP SpO2  
11/12/18 1245  80 16 139/82 (!) 89 % 11/12/18 1230  80 18 140/82 97 % 11/12/18 1200  80 15 140/86 94 % 11/12/18 1115  80 21 (!) 184/113 95 % 11/12/18 1052  80  (!) 160/109 96 % 11/12/18 0930  80 15 (!) 178/97 95 % 11/12/18 0900  80 12 (!) 178/105 95 % 11/12/18 0849  80  (!) 183/107   
11/12/18 0815  80 21 (!) 165/99 93 % 11/12/18 0814 97.5 °F (36.4 °C) 80 18 (!) 165/99 100 % 11/12/18 0727     93 % 11/12/18 0715  80 18 (!) 186/108 93 % 11/12/18 0616 97.8 °F (36.6 °C) 80 20 (!) 166/120 93 % Pulse Oximetry Analysis - 93% on RA Cardiac Monitor:  
Rate: 80 bpm 
Rhythm: Normal Sinus Rhythm EKG interpretation: (Preliminary) 1916 Rhythm: atrial-sensed ventricular paced rhythm with prolonged AV conduction; and regular . Rate (approx.): 80 bpm; Axis: normal; IA interval: normal; QRS interval: normal ; ST/T wave: normal. 
Written by Santos Rose ED Scribe, as dictated by Chad Harrison MD. Records Reviewed: Nursing Notes and Old Medical Records Provider Notes (Medical Decision Making): DDx: Cluster HA, HTN urgency, CAD, costochondritis, biliary colic, CHF, PNA, PE, pleural effusion ED Course:  
Initial assessment performed. The patients presenting problems have been discussed, and they are in agreement with the care plan formulated and outlined with them. I have encouraged them to ask questions as they arise throughout their visit. 8:10 AM 
HA is a 3/10, CP and abd pain resolved. Written by Santos Rose ED Scribe as dictated by Chad Harrison MD 
 
CONSULT NOTE:  
9:33 AM 
Chad Harrison MD spoke with Dr. Qamar Garcia, Specialty: General Surgery Discussed pt's hx, disposition, and available diagnostic and imaging results. Reviewed care plans. Consultant would like a HIDA and a VQ scan. Written by Santos Rose ED Scribe, as dictated by Chad Harrison MD. 
 
CONSULT NOTE:  
9:39 AM 
Chad Harrison MD spoke with Dr. Qamar Garcia, Specialty: General Surgery Discussed pt's hx, disposition, and available diagnostic and imaging results. Reviewed care plans Consultant stated it is critical to do VQ scan first, and HIDA by tomorrow. Written by ABRAM Head, as dictated by Jaspal Ratliff MD. 
 
CONSULT NOTE:  
9:50 AM 
Jaspal Ratliff MD spoke with Dr. Alfredito Fuentes, Specialty: Hospitalist 
Discussed pt's hx, disposition, and available diagnostic and imaging results. Reviewed care plans. Consultant will evaluate pt for admission. Written by ABRAM Head, as dictated by Jaspal Ratliff MD. Critical Care Time: CRITICAL CARE NOTE : 
 
7:16 AM 
 
 
IMPENDING DETERIORATION -Respiratory, Cardiovascular, CNS and Metabolic ASSOCIATED RISK FACTORS - Hypoxia, Dysrhythmia, Metabolic changes, Dehydration and CNS Decompensation MANAGEMENT- Bedside Assessment and Supervision of Care INTERPRETATION -  Xrays, CT Scan, ECG and Blood Pressure INTERVENTIONS - hemodynamic mngmt and Metobolic interventions CASE REVIEW - Hospitalist, Medical Sub-Specialist, Nursing and Family TREATMENT RESPONSE -Improved PERFORMED BY - Self NOTES   : 
 
 
I have spent 65 minutes of critical care time involved in lab review, consultations with specialist, family decision- making, bedside attention and documentation. During this entire length of time I was immediately available to the patient . Jaspal Ratliff MD 
 
 
Disposition: 
ADMIT NOTE: 
9:51 AM 
The patient is being admitted to the hospital by Dr. Alfredito Fuentes. The results of their tests and reasons for their admission have been discussed with the patient and/or available family. They convey agreement and understanding for the need to be admitted and for their admission diagnosis. PLAN: 
1. Admit to hospitalist 
 
Diagnosis Clinical Impression: 1. Acute on chronic systolic CHF (congestive heart failure) ([address] 2. Cholecystitis 3. Acute respiratory failure with hypoxia ([address] 4. Hypokalemia 5. Gallstones 6. Acute chest pain 7. Cluster headache, not intractable, unspecified chronicity pattern Attestations: This note is prepared by Tomy Garcia acting as scribe for MD Jaspal Quesada MD : The scribe's documentation has been prepared under my direction and personally reviewed by me in its entirety. I confirm that the note above accurately reflects all work, treatment, procedures, and medical decision making performed by me.

## 2018-11-12 NOTE — CONSULTS
Cardiology Consult Note    CC: HA/dyspnea/abd pain/CP    Mela Boyer MD  Reason for consult:  CP; HTN  Requesting MD:  Dr. Patel President Date: 11/12/2018   Today's Date: 11/12/2018   Cardiac Assessment/Plan:   Ms Miko Bonilla has a h/o:  1) CAD (LAD ISELA 2014 for NQMI), Neg PET for ischemia 9/2018.  2) mixed ischemic/tachycardia mediated CM 4/2014 (EF 20%); EF 45% 11/2017. [facility] was too expensive. *Low EF 10/9/18 (15-20%): Med Rx for now. 3) HTN, On lower dose meds last months admit due to low BPs after EP procedure: increased @ rehab. 4) PAfib (RFA 4/2016 and 1/2017), Recurrrent/persistent afib 2017/2018   *Chronic Afib now; BiV device in 10/18/18; AV node abation 10/19/18:  Off amiodarone; on Xarelto. 5) DM,   6) SHAYLA (on CPAP),   7) CKD (Cr 1.5 range). Aldactone stopped in 2014. Rec: non-cardiac CP; Med Rx; Increase lisinopril to 10 qday; cont toprol XL 50 (increase as needed; prev on lisinopril 20/toprol  every day and norvasc as below). Restart pradaxa (instead of current hep gtt) when able. ID/GI, SHAYLA, DM, Dispo: per primary team.   For other plans, see orders.      On d/c last month: \"To be off amio; D/C cardiac meds: lisinopril 2.5 qday; toprol XL 25 qday; pradaxa 150 bid; bumex 1 qday; KCL 10. Xarelto qday. ICD check 2 weeks; OV with me in 1 month. OK for rehab from cardiac standpoint. \"    In rehab, she reports lisinopril increased to 5 qday and toprol XL to 50.  ____________________________________________________________________    The patient reports increased BPs (>200s/120), worse dyspnea; reproducible chest wall pain/abd pain/  No PND, orthopnea, palpitations, pre-syncope, syncope, new peripheral edema. Current c/o reproducible chest wall/abd pain. ECG: afib/Vpaced. Tele: afib/Vpaced. CXR: \"Small right pleural effusion suspected. No other evidence of acute finding. \"    Notable labs: trops 0.08/0.08/0.06; Cr 1.45; k 2.8; Nl mag; proBNP 9k.  Nl CBC.  ____________________________________________________________________________________________  Notable prior cardiac history:  @ OV 10/2/18:  Ms Mike Crooks has a h/o:  1) CAD (LAD ISELA 2014 for NQMI), Neg PET for ischemia 9/2018.  2) mixed ischemic/tachycardia mediated CM 4/2014 (EF 20%); EF 45% 11/2017. [facility] was too expensive. 3) HTN,   4) PAfib (RFA 4/2016 and 1/2017), Recurrrent/persistent afib 2017/2018  5) DM,   6) SHAYLA (on CPAP),   7) CKD (Cr 1.5 range). Aldactone stopped in 2014. 8) [facility] PPM 7/2014 for bradycardia while on therapy for AFib.     11/2017: No CP/STEEN; Back in afib today:  Coreg changed Toprol-XL.    10/2018:  Recent ER visit with epigastric pain; negative evaluation there & seen by Dr. Ugo Rodrigues who set up a cardiac PET. The PET shows no ischemia but EF suggested at 16%. Of note patient does have AFib with accelerated ventricular response. No recurrence of epigastric discomfort. No bleeding. Had a simple trip at home a few weeks ago without injury.     IMPRESSION AND PLAN      01. (I25.10) Atherosclerotic heart disease of native coronary artery without angina pectoris:  No anginal symptoms. Cont asa and stopped plavix with need for anticoagulation.     We discussed the signs and symptoms of unstable angina, myocardial infarction and malignant arrhythmia. The patient knows to seek immediate medical attention should they occur. ECG done today   02. (I42.0) Dilated cardiomyopathy:  Mixed ischemic/non-ischemic (tachycardia mediated) CM. Her ejection fraction is greater than 35% after repeat echo. The patient's systolic function has been stable. I suspect the PET has a falsely low EF related to her AFib but repeat echo is needed. 2-D w/CFD Echo to be done first available. 03. (I50.42) Chronic combined systolic (congestive) and diastolic (congestive) heart failure:  Resolved exertional symptoms. (NYHA I)  The patient is compliant with their CHF regimen.    04. (I48.1) Persistent atrial fibrillation:  Management per Dr Corey Cabrera; currently off amio and on Pradaxa. Heart rate has been to elevated; increase Toprol  q.day. Further AFib monitoring/therapy per Dr. Jose Rose. 05. (I13.0) Hyp hrt & chr kdny dis w hrt fail and stg 1-4/unsp chr kdny:  Adequately controlled. We will see how the patient does with the med changes noted above. 06. (E78.2) Mixed hyperlipidemia:  Lipid labs drawn by PCP. The patient is tolerating lipid lowering therapy well. 07. (N18.3) Chronic kidney disease, stage 3 (moderate):  Cr 1.24/GFR46 11/2015. Cr 1.32/GFR 43 1/2017.   08. (R60.0) Localized edema:  Resolved. She avoids salt. 09. (E11.69) Type 2 diabetes mellitus with other specified complication:  Lipids & HTN as noted above; DM managed by other MD.   10. (Z95.0) Presence of cardiac pacemaker:  will continue to be followed in device clinic. 11. (Z79.82) Long term (current) use of aspirin:  This condition is stable. 12. (Z79.01) Long term (current) use of anticoagulants: This condition is stable. Indicated for atrial fibrillation. No bleeding. If she has recurrent falls, she knows to call for re-evaluation of anticoagulation. 13. (Z68.38) Body mass index (BMI) 38.0-38.9, adult:  The patient was instructed on AHA diet and regular exercise.      ORDERS:          1 ECG done today     2 2-D w/ CFD Echocardiogram first available.     3 Return office visit with Rhonda Severs. Caven MD in 6 Months.     4 The patient was instructed on AHA diet and regular exercise.           10/2/18 MEDICATION LIST  Medication Sig Description   amlodipine 5 mg tablet take 1 tablet by oral route  every day per pc   aspirin 81 mg tablet,delayed release take 1 tablet by oral route  every day   atorvastatin 40 mg tablet take 1 tablet by oral route  every evening   bumetanide 1 mg tablet 1 in am on tues&thursday 2 tablets on mon wed fridays   Calcium 600 600 mg (1,500 mg) tablet daily   clonazepam 0.5 mg tablet take 1 tablet by oral route  every day   LISINOPRIL 20 MG Tablet TAKE 1 TABLET EVERY DAY   Lyrica 200 mg capsule take 1 capsule by oral route 2 times every day   magnesium 250 mg tablet take 1 tablet by oral route  every day   metoprolol succinate  mg tablet,extended release 24 hr take 1 tablet by oral route  every day   potassium gluconate 500 mg (83 mg) tablet daily   Pradaxa 150 mg capsule take 1 capsule by oral route 2 times every day   Premarin 0.625 mg/gram vaginal cream insert 0.5 applicatorful by vaginal route  every day cyclically, 3 weeks on and 1 week off   venlafaxine 75 mg tablet 2 po bid   Vitamin D3 1,000 unit capsule take 1 daily      ____________________________________________________________________________________________________  CARDIAC HISTORY  CAD:        1 NSTEMI [95% Proximal LAD, Resolute (ISELA) to the Proximal LAD.] - 4/8/2014      CHF/CM:        1 Mixed ischemic/tachycardic CM. [Nl TSH.] - 4/2014   2 Ischemic & tachycardic Cardiomyopathy [Echo (EF 0.45) - 06/17/2014, (prev EF 20-30%)] - 6/2014      ARRHYTHMIA:        1 A Fib [DCCV, Plan: amio started; Eliquis with Effient (change eliquis to asa if NSR after 30 days). ] - 4/8/2014   2 PAF - 8/2010   3 A Fib, recurrent; and 6/2014. [Had marked sinus bradycardia while on bblocker/dig.] - 5/2014   4 Sinus Bradycardia. - 6/2/2014   5 PPM (Devices (Dual Chamber PPM (Louvella Brakeman)) - 7/17/2014) - 7/17/2014   6 PAF [CVR; Eliquis restarted (plavix stopped). ] - 9/2015   7 A Fib [RFA] - 4/2016   8 A Fib [RFA] - 1/2017      RISK FACTORS:        1 Diabetes [Diagnosed in 2014]   2 Hypertension   3 Dyslipidemia         CARDIOVASCULAR PROCEDURES  Procedure Date Results   Cardiac PET 09/24/2018 EF 0.16 (16%), physiologic apical thinning with no ischemia   Cath 04/08/2014 95% Proximal LAD, Resolute (ISELA) to the Proximal LAD.    DCCV 04/08/2014 Initial Rhythm A Fib, Final Rhythm Sinus   Devices 07/17/2014 Dual Chamber PPM (St. [id number])   Echo 11/28/2017 EF 0.45 (45%), Mild Global Hypo, Mild TR, Mild LVH, Mild MR, Est RVSP 41 mmHg, Normal RV. (probable trileaflet AoV). Echo 10/30/2015 EF 0.45 (45%), Mild LVH, LA Diam 4.1 cm, Est RVSP 35 mmHg, Normal RV. ?bicuspid AoV; (Prob trileaflet on previous ANGELITO). Echo 2014 EF 0.45 (45%), EF range 45%-50%, Mild LV dilatation. Mild LV systolic dysfunction. ? Bicuspid AoV but prob trileafet on previous ANGELITO. Echo 2014 EF 0.20 (20%), global HK with lateral sparing; no valve disease. EKG 10/02/2018 Atrial Fib, ; nonspecific T-wave flattening. Holter 2014 No Malignant Arrhythmias, Atrial Fib, No correlation with symptoms, (, [address]   Holter 2014 No Malignant Arrhythmias, Atrial Fib, No correlation with symptoms, \"light or heavy chest\" = afib in 60s. HR  (ave 63). ASx pauses (upto 2.8) while asleep. RFA 2017 Indication A Fib, Final Rhythm Sinus   RFA   Indication A Fib   Sleep Study   OSAS: Moderate   ANGELITO 2014 EF 0.35 (35%), No BRAYDON Clot, prob trileaflet AoV.       ACTIVE ALLERGIES:  Ingredient Reaction Comment   SACUBITRIL [place] is too expensive     SULFA (SULFONAMIDE ANTIBIOTICS)       AMOXICILLIN TRIHYDRATE       POTASSIUM CLAVULANATE          ACTIVE INTOLERANCES:  Description Reaction Comment   SACUBITRIL [place] is too expensive        PROBLEM LIST:  Problem Description Chronic   Benign essential HTN Y   DM type 2 N   A-fib Y   CAD Y   Mixed hyperlipidemia Y         PAST MEDICAL/SURGICAL HISTORY  (Detailed)     Disease/disorder Onset Date Management Date Comments       hysterectomy       A-FIB           Cardiovascular disease           CHF           Hyperlipidemia           Hypertension           SHAYLA: moderate.  2014            Family History  (Detailed)  Relationship Family Member Name  Age at Death Condition Onset Age Cause of Death   Brother       Hypertension   N   Mother       PAD   N   Mother       Hypertension   N   Mother       Cardiac arrhythmias   N   Sister       Diabetes mellitus   N      SOCIAL HISTORY  (Detailed)  Preferred language is [place].     EDUCATION/EMPLOYMENT/OCCUPATION  Employment History Status Retired Restrictions       retired           retired           retired           retired           retired           retired           retired           retired           retired           retired         MARITAL STATUS/FAMILY/SOCIAL SUPPORT  Currently .             ______________________________________________________________________  Patient Active Problem List    Diagnosis Date Noted    Acute on chronic systolic CHF (congestive heart failure) ([address] 11/12/2018    AYLIN (acute kidney injury) ([address] 10/13/2018    Atrial fibrillation with rapid ventricular response ([address] 10/07/2018    Hypokalemia 10/07/2018    Acute respiratory failure with hypoxemia ([address] 10/06/2018    Anxiety 01/22/2018    Hypercholesterolemia 01/22/2018    Long-term use of high-risk medication 01/22/2018    Cellulitis of left lower leg 06/06/2016    Lower abdominal pain 06/05/2016    Type 2 diabetes mellitus with diabetic neuropathy, without long-term current use of insulin ([address] 06/05/2016    A-fib ([address] 77/82/8927    Systolic CHF, chronic ([address] 06/05/2016    Fever 06/04/2016    UTI (urinary tract infection) 13/60/5879    Acute systolic heart failure ([address] 04/04/2014    Foot pain, right 11/20/2013    Unspecified hereditary and idiopathic peripheral neuropathy 02/22/2013    HBP (high blood pressure) 02/22/2013    Spinal stenosis, lumbar region, without neurogenic claudication 02/22/2013    Essential and other specified forms of tremor 02/22/2013    IBS (irritable bowel syndrome) 02/22/2013    Depression 02/22/2013    Migraine with aura, without mention of intractable migraine without mention of status migrainosus 02/22/2013    Right knee DJD 02/22/2013    B12 deficiency 02/22/2013    Ataxia 02/22/2013        Past Medical History: Diagnosis Date    Anxiety 1/22/2018    Arthritis     OA    Asthma     Diabetes ([address]     Essential hypertension     GERD (gastroesophageal reflux disease)     Hypercholesterolemia 1/22/2018    Hypertension     Long-term use of high-risk medication 1/22/2018    Neuropathy     Other ill-defined conditions(799.89)     IBS, spinal stenosis    Plantar fasciitis     Psychiatric disorder     depression, anxiety    Sleep apnea     uses CPAP    Type 2 diabetes mellitus with diabetic neuropathy, without long-term current use of insulin ([address] 6/5/2016      Past Surgical History:   Procedure Laterality Date    CARDIAC SURG PROCEDURE UNLIST      stent    HX APPENDECTOMY      HX HYSTERECTOMY      HX PACEMAKER       Allergies   Allergen Reactions    Sulfa (Sulfonamide Antibiotics) Swelling    Amoxicillin Swelling      Family History   Problem Relation Age of Onset    Arthritis-osteo Mother     Hypertension Mother     High Cholesterol Mother     Crohn's Disease Mother     Heart Disease Mother     Alcohol abuse Father     High Cholesterol Sister     Hypertension Sister     Thyroid Disease Sister     COPD Sister     High Cholesterol Brother     Hypertension Brother     COPD Brother     COPD Child     Inflammatory Bowel Dz Child       Social History     Socioeconomic History    Marital status:      Spouse name: Not on file    Number of children: Not on file    Years of education: Not on file    Highest education level: Not on file   Social Needs    Financial resource strain: Not on file    Food insecurity - worry: Not on file    Food insecurity - inability: Not on file   Voddler needs - medical: Not on file   Voddler needs - non-medical: Not on file   Occupational History    Not on file   Tobacco Use    Smoking status: Never Smoker    Smokeless tobacco: Never Used   Substance and Sexual Activity    Alcohol use: No    Drug use: No    Sexual activity: Not on file   Other Topics Concern    Not on file   Social History Narrative    Not on file     Current Facility-Administered Medications   Medication Dose Route Frequency    sodium chloride (NS) flush 5-10 mL  5-10 mL IntraVENous Q8H    sodium chloride (NS) flush 5-10 mL  5-10 mL IntraVENous PRN    acetaminophen (TYLENOL) tablet 650 mg  650 mg Oral Q6H PRN    fentaNYL citrate (PF) injection 50 mcg  50 mcg IntraVENous Q6H PRN    levoFLOXacin (LEVAQUIN) 750 mg in D5W IVPB  750 mg IntraVENous Q48H    metroNIDAZOLE (FLAGYL) IVPB premix 500 mg  500 mg IntraVENous Q8H    oxyCODONE IR (ROXICODONE) tablet 5 mg  5 mg Oral Q6H PRN    ondansetron (ZOFRAN) injection 4 mg  4 mg IntraVENous Q6H PRN    docusate sodium (COLACE) capsule 100 mg  100 mg Oral BID    potassium chloride SR (KLOR-CON 10) tablet 40 mEq  40 mEq Oral Q6H    atorvastatin (LIPITOR) tablet 40 mg  40 mg Oral QHS    clonazePAM (KlonoPIN) tablet 0.5 mg  0.5 mg Oral QHS    [START ON 11/13/2018] potassium chloride SR (KLOR-CON 10) tablet 20 mEq  20 mEq Oral DAILY    pregabalin (LYRICA) capsule 200 mg  200 mg Oral BID    venlafaxine-SR (EFFEXOR-XR) capsule 150 mg  150 mg Oral BID WITH MEALS    nitroglycerin (Tridil) 200 mcg/ml infusion  0-200 mcg/min IntraVENous TITRATE    lisinopril (PRINIVIL, ZESTRIL) tablet 5 mg  5 mg Oral DAILY    metoprolol succinate (TOPROL-XL) XL tablet 100 mg  100 mg Oral DAILY    bumetanide (BUMEX) injection 1 mg  1 mg IntraVENous BID    heparin 25,000 units in D5W 250 ml infusion  9.1-25 Units/kg/hr IntraVENous TITRATE    [START ON 11/13/2018] pantoprazole (PROTONIX) tablet 40 mg  40 mg Oral ACB    heparin (porcine) injection 4,000 Units  4,000 Units IntraVENous PRN    heparin (porcine) injection 2,000 Units  2,000 Units IntraVENous PRN    insulin lispro (HUMALOG) injection   SubCUTAneous AC&HS    glucose chewable tablet 16 g  4 Tab Oral PRN    dextrose (D50W) injection syrg 12.5-25 g  12.5-25 g IntraVENous PRN    glucagon (GLUCAGEN) injection 1 mg  1 mg IntraMUSCular PRN     Current Outpatient Medications   Medication Sig    clonazePAM (KLONOPIN) 0.5 mg tablet TAKE 1 TABLET EVERY NIGHT. MAX DAILY DOSE OF 0.5MG.  potassium chloride SR (KLOR-CON 10) 10 mEq tablet Take 1 Tab by mouth daily.  bumetanide (BUMEX) 1 mg tablet Take 1 Tab by mouth daily. (Patient taking differently: Take 2 mg by mouth daily.)    lisinopril (PRINIVIL, ZESTRIL) 2.5 mg tablet Take 1 Tab by mouth daily. (Patient taking differently: Take 5 mg by mouth daily.)    metoprolol succinate (TOPROL-XL) 25 mg XL tablet Take 1 Tab by mouth daily. (Patient taking differently: Take 50 mg by mouth daily.)    venlafaxine-SR (EFFEXOR XR) 150 mg capsule Take 150 mg by mouth two (2) times daily (with meals).  dabigatran etexilate (PRADAXA) 150 mg capsule Take 150 mg by mouth two (2) times a day.  pregabalin (LYRICA) 200 mg capsule Take 200 mg by mouth two (2) times a day.  cholecalciferol, vitamin d3, (VITAMIN D3) 400 unit cap Take 400 Units by mouth daily.  aspirin 81 mg chewable tablet Take 81 mg by mouth daily.  atorvastatin (LIPITOR) 40 mg tablet Take 1 Tab by mouth nightly. No reported FHx of early CAD or SCD    Prior to Admission Medications:  Prior to Admission medications    Medication Sig Start Date End Date Taking? Authorizing Provider   clonazePAM (KLONOPIN) 0.5 mg tablet TAKE 1 TABLET EVERY NIGHT. MAX DAILY DOSE OF 0.5MG. 10/22/18  Yes aJne Mota MD   potassium chloride SR (KLOR-CON 10) 10 mEq tablet Take 1 Tab by mouth daily. 10/22/18  Yes Jane Mota MD   bumetanide (BUMEX) 1 mg tablet Take 1 Tab by mouth daily. Patient taking differently: Take 2 mg by mouth daily. 10/20/18 10/20/19 Yes Antonino Mcmahon MD   lisinopril (PRINIVIL, ZESTRIL) 2.5 mg tablet Take 1 Tab by mouth daily. Patient taking differently: Take 5 mg by mouth daily.  10/20/18  Yes Antonino Mcmahon MD   metoprolol succinate (TOPROL-XL) 25 mg XL tablet Take 1 Tab by mouth daily. Patient taking differently: Take 50 mg by mouth daily. 10/20/18  Yes Freda Daly MD   venlafaxine-SR (EFFEXOR XR) 150 mg capsule Take 150 mg by mouth two (2) times daily (with meals). Yes Provider, Historical   dabigatran etexilate (PRADAXA) 150 mg capsule Take 150 mg by mouth two (2) times a day. Yes Other, MD Ernie   pregabalin (LYRICA) 200 mg capsule Take 200 mg by mouth two (2) times a day. Yes Other, MD Ernie   cholecalciferol, vitamin d3, (VITAMIN D3) 400 unit cap Take 400 Units by mouth daily. Yes Other, MD Ernie   aspirin 81 mg chewable tablet Take 81 mg by mouth daily. Yes Provider, Historical   atorvastatin (LIPITOR) 40 mg tablet Take 1 Tab by mouth nightly. 14  Yes Freda Daly MD        Review of Symptoms: As noted in H&P:  \"\".      24 hr VS reviewed, overall VSSAF  Temp (24hrs), Av.7 °F (36.5 °C), Min:97.5 °F (36.4 °C), Max:97.8 °F (36.6 °C)    Patient Vitals for the past 8 hrs:   Pulse   18 1245 80   18 1230 80   18 1200 80   18 1115 80   18 1052 80   18 0930 80   18 0900 80   18 0849 80   18 0815 80   18 0814 80   18 0715 80   18 0616 80    Patient Vitals for the past 8 hrs:   Resp   18 1245 16   18 1230 18   18 1200 15   18 1115 21   18 0930 15   18 0900 12   18 0815 21   18 0814 18   18 0715 18   18 0616 20    Patient Vitals for the past 8 hrs:   BP   18 1245 139/82   18 1230 140/82   18 1200 140/86   18 1115 (!) 184/113   18 1052 (!) 160/109   18 0930 (!) 178/97   18 0900 (!) 178/105   18 0849 (!) 183/107   18 0815 (!) 165/99   18 0814 (!) 165/99   18 0715 (!) 186/108   18 0616 (!) 166/120          Intake/Output Summary (Last 24 hours) at 2018 1311  Last data filed at 2018 1235  Gross per 24 hour   Intake    Output 500 ml   Net -500 ml         Physical Exam (complete single organ system exam)    Cons: The patient is no distress. Appears stated age. HEENT: Normal conjunctivae and palate. No xanthelasma. Neck: Flat JVP without appreciable HJR. Resp: Normal respiratory effort with clear lungs bilaterally. CV: Regular rate and rhythm. PMI not palpated. Normal S1,S2  No gallop or rubs appreciated. MR murmur appreciated. Intact carotid upstroke bilaterally without appreciated bruits. Abdominal aorta not palpated; no abdominal bruit noted. Intact pedal pulses. Minimal peripheral edema. GI: No abd mass noted, soft; no organomegaly noted. Bowel sounds present. Muscular:  No significant kyphosis. Strength WNL for age. Ext: No cyanosis, clubbing, or stigmata of peripheral embolization. Derm: No ulcers or stasis dermatitis of lower extremities. Neuro: Alert and oriented x 3;  Grossly non-focal. Normal mood and affect. Labs:   Recent Results (from the past 24 hour(s))   EKG, 12 LEAD, INITIAL    Collection Time: 11/12/18  6:18 AM   Result Value Ref Range    Ventricular Rate 80 BPM    Atrial Rate 80 BPM    P-R Interval 216 ms    QRS Duration 178 ms    Q-T Interval 484 ms    QTC Calculation (Bezet) 558 ms    Calculated P Axis 68 degrees    Calculated R Axis -119 degrees    Calculated T Axis 56 degrees    Diagnosis        Suspect unspecified pacemaker failure  Atrial-sensed ventricular-paced rhythm with prolonged AV conduction  When compared with ECG of 06-OCT-2018 15:35,  Electronic ventricular pacemaker has replaced Atrial fibrillation  Vent.  rate has decreased BY  49 BPM     CBC WITH AUTOMATED DIFF    Collection Time: 11/12/18  6:48 AM   Result Value Ref Range    WBC 6.3 3.6 - 11.0 K/uL    RBC 4.02 3.80 - 5.20 M/uL    HGB 11.6 11.5 - 16.0 g/dL    HCT 37.5 35.0 - 47.0 %    MCV 93.3 80.0 - 99.0 FL    MCH 28.9 26.0 - 34.0 PG    MCHC 30.9 30.0 - 36.5 g/dL    RDW 18.3 (H) 11.5 - 14.5 %    PLATELET 377 574 - 135 K/uL    MPV 11.6 8.9 - 12.9 FL    NRBC 0.0 0  WBC    ABSOLUTE NRBC 0.00 0.00 - 0.01 K/uL    NEUTROPHILS 68 32 - 75 %    LYMPHOCYTES 21 12 - 49 %    MONOCYTES 7 5 - 13 %    EOSINOPHILS 3 0 - 7 %    BASOPHILS 0 0 - 1 %    IMMATURE GRANULOCYTES 1 (H) 0.0 - 0.5 %    ABS. NEUTROPHILS 4.3 1.8 - 8.0 K/UL    ABS. LYMPHOCYTES 1.3 0.8 - 3.5 K/UL    ABS. MONOCYTES 0.5 0.0 - 1.0 K/UL    ABS. EOSINOPHILS 0.2 0.0 - 0.4 K/UL    ABS. BASOPHILS 0.0 0.0 - 0.1 K/UL    ABS. IMM. GRANS. 0.0 0.00 - 0.04 K/UL    DF AUTOMATED     METABOLIC PANEL, COMPREHENSIVE    Collection Time: 11/12/18  6:48 AM   Result Value Ref Range    Sodium 146 (H) 136 - 145 mmol/L    Potassium 2.8 (L) 3.5 - 5.1 mmol/L    Chloride 109 (H) 97 - 108 mmol/L    CO2 29 21 - 32 mmol/L    Anion gap 8 5 - 15 mmol/L    Glucose 122 (H) 65 - 100 mg/dL    BUN 21 (H) 6 - 20 MG/DL    Creatinine 1.45 (H) 0.55 - 1.02 MG/DL    BUN/Creatinine ratio 14 12 - 20      GFR est AA 44 (L) >60 ml/min/1.73m2    GFR est non-AA 36 (L) >60 ml/min/1.73m2    Calcium 8.3 (L) 8.5 - 10.1 MG/DL    Bilirubin, total 0.9 0.2 - 1.0 MG/DL    ALT (SGPT) 23 12 - 78 U/L    AST (SGOT) 24 15 - 37 U/L    Alk. phosphatase 161 (H) 45 - 117 U/L    Protein, total 6.6 6.4 - 8.2 g/dL    Albumin 3.2 (L) 3.5 - 5.0 g/dL    Globulin 3.4 2.0 - 4.0 g/dL    A-G Ratio 0.9 (L) 1.1 - 2.2     CK W/ REFLX CKMB    Collection Time: 11/12/18  6:48 AM   Result Value Ref Range    CK 50 26 - 192 U/L   TROPONIN I    Collection Time: 11/12/18  6:48 AM   Result Value Ref Range    Troponin-I, Qt. 0.08 (H) <0.05 ng/mL   SAMPLES BEING HELD    Collection Time: 11/12/18  6:48 AM   Result Value Ref Range    SAMPLES BEING HELD 1BLUE     COMMENT        Add-on orders for these samples will be processed based on acceptable specimen integrity and analyte stability, which may vary by analyte.    CK    Collection Time: 11/12/18  6:48 AM   Result Value Ref Range    CK 50 26 - 192 U/L   LIPASE    Collection Time: 11/12/18  6:48 AM   Result Value Ref Range Lipase 152 73 - 393 U/L   NT-PRO BNP    Collection Time: 11/12/18  7:15 AM   Result Value Ref Range    NT pro-BNP 9,672 (H) 0 - 125 PG/ML   D DIMER    Collection Time: 11/12/18  7:15 AM   Result Value Ref Range    D-dimer 0.79 (H) 0.00 - 0.65 mg/L FEU   PROTHROMBIN TIME + INR    Collection Time: 11/12/18  7:17 AM   Result Value Ref Range    INR 1.4 (H) 0.9 - 1.1      Prothrombin time 14.4 (H) 9.0 - 11.1 sec   PTT    Collection Time: 11/12/18  7:17 AM   Result Value Ref Range    aPTT 50.7 (H) 22.1 - 32.0 sec    aPTT, therapeutic range     58.0 - 77.0 SECS   MAGNESIUM    Collection Time: 11/12/18  7:17 AM   Result Value Ref Range    Magnesium 1.8 1.6 - 2.4 mg/dL   TROPONIN I    Collection Time: 11/12/18  8:51 AM   Result Value Ref Range    Troponin-I, Qt. 0.08 (H) <0.05 ng/mL   URINALYSIS W/ RFLX MICROSCOPIC    Collection Time: 11/12/18  9:41 AM   Result Value Ref Range    Color YELLOW/STRAW      Appearance CLEAR CLEAR      Specific gravity 1.008 1.003 - 1.030      pH (UA) 6.5 5.0 - 8.0      Protein TRACE (A) NEG mg/dL    Glucose NEGATIVE  NEG mg/dL    Ketone NEGATIVE  NEG mg/dL    Bilirubin NEGATIVE  NEG      Blood NEGATIVE  NEG      Urobilinogen 0.2 0.2 - 1.0 EU/dL    Nitrites NEGATIVE  NEG      Leukocyte Esterase TRACE (A) NEG      WBC 0-4 0 - 4 /hpf    RBC 0-5 0 - 5 /hpf    Epithelial cells FEW FEW /lpf    Bacteria NEGATIVE  NEG /hpf    Hyaline cast 0-2 0 - 5 /lpf   GLUCOSE, POC    Collection Time: 11/12/18 12:52 PM   Result Value Ref Range    Glucose (POC) 113 (H) 65 - 100 mg/dL    Performed by Diann Sandoval      Recent Labs     11/12/18  0851 11/12/18  0648   CPK  --  50   TROIQ 0.08* 0.08*       Recent Labs     11/12/18  0648   *   K 2.8*   *   CO2 29   BUN 21*   CREA 1.45*   GFRAA 44*   *   CA 8.3*   ALB 3.2*   WBC 6.3   HGB 11.6   HCT 37.5          Macrina Garrett MD

## 2018-11-12 NOTE — ED NOTES
TRANSFER - OUT REPORT: 
 
Verbal report given to Destiny Rosen RN (name) on Jo Valero  being transferred to [facility] (unit) for routine progression of care Report consisted of patients Situation, Background, Assessment and  
Recommendations(SBAR). Information from the following report(s) SBAR, Kardex, ED Summary, Intake/Output, MAR and Med Rec Status was reviewed with the receiving nurse. Lines:  
Peripheral IV 11/12/18 Left Hand (Active) Site Assessment Clean, dry, & intact 11/12/2018  7:32 AM  
Phlebitis Assessment 0 11/12/2018  7:32 AM  
Infiltration Assessment 0 11/12/2018  7:32 AM  
Dressing Status Clean, dry, & intact 11/12/2018  7:32 AM  
Dressing Type Transparent 11/12/2018  7:32 AM  
   
Peripheral IV 11/12/18 Right Antecubital (Active) Site Assessment Clean, dry, & intact 11/12/2018 12:14 PM  
Phlebitis Assessment 0 11/12/2018 12:14 PM  
Infiltration Assessment 0 11/12/2018 12:14 PM  
Dressing Status Clean, dry, & intact 11/12/2018 12:14 PM  
Hub Color/Line Status Pink 11/12/2018 12:14 PM  
  
 
Opportunity for questions and clarification was provided. Patient transported with: 
 [employer]

## 2018-11-12 NOTE — PROGRESS NOTES
Pharmacy  Heparin Monitoring Indication:  AFib bridge therapy until surgery Thursday 11/15 PTA Anticoagulation:  Dabigatran 150mg BID Goal aPTT: 58-77 seconds Labs: 
Recent Labs 11/12/18 
9504 11/12/18 
4551 APTT 50.7*  --   
HGB  --  11.6 PLT  --  205 INR 1.4*  --   
 
Estimated Creatinine Clearance: 50.5 mL/min (A) (based on SCr of 1.45 mg/dL (H)). Estimated Creatinine Clearance (using IBW):39.9 mL/min Current rate:  new start Impression/Plan:  
? Pt on Pradaxa 150mg BID PTA with last dose taken 11/11 21:30pm 
? Recommended wash out period in preparation for major surgery for Dabigatran is 3-5 days for eCrCl 31-50mL/min ? Baseline aPPT done this am 
? Initiate Heparin infusion at 9.1un/kg/hr no bolus since pt already anticoagulated ? Heparin infusion dosing reviewed w/ ED Nurse, Emily Peoples 
? Recommended stop Heparin infusion 4-6 hours pre surgery on Thursday Note:  when Pradaxa is resumed post op, carefully evaluate renal function (eCrCl 15-30 = 75mg BID, eCrCl >30mL/min = 150mg BID) Thanks, 
Aixa Freedman, [facility]

## 2018-11-12 NOTE — PROGRESS NOTES
Transitional Care [facility] Note Patient: Grant Sultana MRN: [id number]  SSN: [id number] YOB: 1950  Age: 79 y.o. Sex: female Admit Date: 11/12/2018 LOS: 0 days Subjective:  
 
Patient seen in ED for a pending readmission for chest and associated abdominal pain. Her last admission 10/6/18, was due to hypoxemia for exacerbation of moderate CHF. She was intubated for respiratory failure, then required reintubation . Upon extubation and stabilization ACP discussion was completed and filed accordingly. pt was then discharged to [address] where she was discharged to home last week. Today she has requested to become a Full code again, which contradicts our previous discussion. I have discussed the following code status and completed a POST form which reflects this change. Copies have been distributed to patient, EMR and for [facility] records. Upon observation she has 2L O2 per NC, without respiratory distress. Mild abdominal pain with palpation. SBP continues to remain elevated. Persons included in Conversation:  patient and family Length of ACP Conversation in minutes:  25 minutes Authorized Decision Maker (if patient is incapable of making informed decisions): This person is: 
Healthcare Agent/Medical Power of  under Advance Directive General ACP for ALL Patients with Decision Making Capacity: 
 Exploration of values, goals, and preferences if recovery is not expected, even with continued medical treatment in the event of: Imminent death Review of Existing Advance Directive: 
Does this advance directive still reflect your preferences? No: , will complete a new AMD (Provide new form/Refer for assistance in updating) For Serious or Chronic Illness: 
Understanding of CPR, goals and expected outcomes, benefits and burdens discussed.  
Information on CPR success rates provided (e.g. for CPR in hospital, survival to d/c at two weeks is 22%, for chronically ill or elderly/frail survival is less than 3%); Individual asked to communicate understanding of information in his/her own words. Explored fears and concerns regarding CPR or possible outcomes Interventions Provided: 
Completed new Advance Directive ROS:  
 
A comprehensive ROS was performed and was negative except for as per HPI. Objective:  
 
Current Facility-Administered Medications Medication Dose Route Frequency  sodium chloride (NS) flush 5-10 mL  5-10 mL IntraVENous Q8H  
 sodium chloride (NS) flush 5-10 mL  5-10 mL IntraVENous PRN  
 acetaminophen (TYLENOL) tablet 650 mg  650 mg Oral Q6H PRN  
 fentaNYL citrate (PF) injection 50 mcg  50 mcg IntraVENous Q6H PRN  
 levoFLOXacin (LEVAQUIN) 750 mg in D5W IVPB  750 mg IntraVENous Q48H  
 metroNIDAZOLE (FLAGYL) IVPB premix 500 mg  500 mg IntraVENous Q8H  
 oxyCODONE IR (ROXICODONE) tablet 5 mg  5 mg Oral Q6H PRN  
 ondansetron (ZOFRAN) injection 4 mg  4 mg IntraVENous Q6H PRN  
 docusate sodium (COLACE) capsule 100 mg  100 mg Oral BID  potassium chloride SR (KLOR-CON 10) tablet 40 mEq  40 mEq Oral Q6H  
 atorvastatin (LIPITOR) tablet 40 mg  40 mg Oral QHS  clonazePAM (KlonoPIN) tablet 0.5 mg  0.5 mg Oral QHS  [START ON 11/13/2018] potassium chloride SR (KLOR-CON 10) tablet 20 mEq  20 mEq Oral DAILY  pregabalin (LYRICA) capsule 200 mg  200 mg Oral BID  venlafaxine-SR (EFFEXOR-XR) capsule 150 mg  150 mg Oral BID WITH MEALS  metoprolol succinate (TOPROL-XL) XL tablet 100 mg  100 mg Oral DAILY  bumetanide (BUMEX) injection 1 mg  1 mg IntraVENous BID  heparin 25,000 units in D5W 250 ml infusion  9.1-25 Units/kg/hr IntraVENous TITRATE  [START ON 11/13/2018] pantoprazole (PROTONIX) tablet 40 mg  40 mg Oral ACB  heparin (porcine) injection 4,000 Units  4,000 Units IntraVENous PRN  
 heparin (porcine) injection 2,000 Units  2,000 Units IntraVENous PRN  
  insulin lispro (HUMALOG) injection   SubCUTAneous AC&HS  
 glucose chewable tablet 16 g  4 Tab Oral PRN  
 dextrose (D50W) injection syrg 12.5-25 g  12.5-25 g IntraVENous PRN  
 glucagon (GLUCAGEN) injection 1 mg  1 mg IntraMUSCular PRN  
 labetalol (NORMODYNE;TRANDATE) injection 10 mg  10 mg IntraVENous Q2H PRN  
 [START ON 11/13/2018] lisinopril (PRINIVIL, ZESTRIL) tablet 10 mg  10 mg Oral DAILY  nitroglycerin (NITROBID) 2 % ointment 1 Inch  1 Inch Topical Q6H PRN  
 hydrALAZINE (APRESOLINE) 20 mg/mL injection 10 mg  10 mg IntraVENous Q4H PRN Current Outpatient Medications Medication Sig  clonazePAM (KLONOPIN) 0.5 mg tablet TAKE 1 TABLET EVERY NIGHT. MAX DAILY DOSE OF 0.5MG.  potassium chloride SR (KLOR-CON 10) 10 mEq tablet Take 1 Tab by mouth daily.  bumetanide (BUMEX) 1 mg tablet Take 1 Tab by mouth daily. (Patient taking differently: Take 2 mg by mouth daily.)  lisinopril (PRINIVIL, ZESTRIL) 2.5 mg tablet Take 1 Tab by mouth daily. (Patient taking differently: Take 5 mg by mouth daily.)  metoprolol succinate (TOPROL-XL) 25 mg XL tablet Take 1 Tab by mouth daily. (Patient taking differently: Take 50 mg by mouth daily.)  venlafaxine-SR (EFFEXOR XR) 150 mg capsule Take 150 mg by mouth two (2) times daily (with meals).  dabigatran etexilate (PRADAXA) 150 mg capsule Take 150 mg by mouth two (2) times a day.  pregabalin (LYRICA) 200 mg capsule Take 200 mg by mouth two (2) times a day.  cholecalciferol, vitamin d3, (VITAMIN D3) 400 unit cap Take 400 Units by mouth daily.  aspirin 81 mg chewable tablet Take 81 mg by mouth daily.  atorvastatin (LIPITOR) 40 mg tablet Take 1 Tab by mouth nightly. Patient Vitals for the past 24 hrs: 
 Temp Pulse Resp BP SpO2  
11/12/18 1700  80 17 153/89 96 % 11/12/18 1630  80 14 (!) 148/98 97 % 11/12/18 1600  80 15 148/86 95 % 11/12/18 1530  80 14 (!) 143/96 95 % 11/12/18 1445  81 13 121/52 100 % 11/12/18 1415  80 16 118/70 99 % 11/12/18 1400  80 15 135/74 98 % 11/12/18 1345  80 16 119/75 98 % 11/12/18 1315  80 19 126/87 99 % 11/12/18 1245  80 16 139/82 (!) 89 % 11/12/18 1230  80 18 140/82 97 % 11/12/18 1200  80 15 140/86 94 % 11/12/18 1115  80 21 (!) 184/113 95 % 11/12/18 1052  80  (!) 160/109 96 % 11/12/18 0930  80 15 (!) 178/97 95 % 11/12/18 0900  80 12 (!) 178/105 95 % 11/12/18 0849  80  (!) 183/107   
11/12/18 0815  80 21 (!) 165/99 93 % 11/12/18 0814 97.5 °F (36.4 °C) 80 18 (!) 165/99 100 % 11/12/18 0727     93 % 11/12/18 0715  80 18 (!) 186/108 93 % 11/12/18 0616 97.8 °F (36.6 °C) 80 20 (!) 166/120 93 % Intake and Output: 
 
Intake/Output Summary (Last 24 hours) at 11/12/2018 1733 Last data filed at 11/12/2018 1646 Gross per 24 hour Intake  Output 950 ml Net -950 ml Physical Exam: 
General appearance: alert, cooperative, no distress, appears stated age Lungs: clear to auscultation bilaterally Heart: regular rate and rhythm, S1, S2 normal, no murmur, click, rub or gallop Abdomen: soft, diffuse tenderness Bowel sounds normal. No masses,  no organomegaly Neurologic: Grossly normal 
 
 
 
Lab/Data Review:  
Recent Results (from the past 24 hour(s)) EKG, 12 LEAD, INITIAL Collection Time: 11/12/18  6:18 AM  
Result Value Ref Range Ventricular Rate 80 BPM  
 Atrial Rate 80 BPM  
 P-R Interval 216 ms  
 QRS Duration 178 ms Q-T Interval 484 ms QTC Calculation (Bezet) 558 ms Calculated P Axis 68 degrees Calculated R Axis -119 degrees Calculated T Axis 56 degrees Diagnosis Atrial-sensed ventricular-paced rhythm with prolonged AV conduction When compared with ECG of 06-OCT-2018 15:35, Electronic ventricular pacemaker has replaced Atrial fibrillation Vent.  rate has decreased BY  49 BPM 
Confirmed by Obdulia Baca ([id number]) on 11/12/2018 4:57:05 PM 
  
CBC WITH AUTOMATED DIFF  
 Collection Time: 11/12/18  6:48 AM  
Result Value Ref Range WBC 6.3 3.6 - 11.0 K/uL  
 RBC 4.02 3.80 - 5.20 M/uL  
 HGB 11.6 11.5 - 16.0 g/dL HCT 37.5 35.0 - 47.0 % MCV 93.3 80.0 - 99.0 FL  
 MCH 28.9 26.0 - 34.0 PG  
 MCHC 30.9 30.0 - 36.5 g/dL  
 RDW 18.3 (H) 11.5 - 14.5 % PLATELET 160 811 - 713 K/uL MPV 11.6 8.9 - 12.9 FL  
 NRBC 0.0 0  WBC ABSOLUTE NRBC 0.00 0.00 - 0.01 K/uL NEUTROPHILS 68 32 - 75 % LYMPHOCYTES 21 12 - 49 % MONOCYTES 7 5 - 13 % EOSINOPHILS 3 0 - 7 % BASOPHILS 0 0 - 1 % IMMATURE GRANULOCYTES 1 (H) 0.0 - 0.5 % ABS. NEUTROPHILS 4.3 1.8 - 8.0 K/UL  
 ABS. LYMPHOCYTES 1.3 0.8 - 3.5 K/UL  
 ABS. MONOCYTES 0.5 0.0 - 1.0 K/UL  
 ABS. EOSINOPHILS 0.2 0.0 - 0.4 K/UL  
 ABS. BASOPHILS 0.0 0.0 - 0.1 K/UL  
 ABS. IMM. GRANS. 0.0 0.00 - 0.04 K/UL  
 DF AUTOMATED METABOLIC PANEL, COMPREHENSIVE Collection Time: 11/12/18  6:48 AM  
Result Value Ref Range Sodium 146 (H) 136 - 145 mmol/L Potassium 2.8 (L) 3.5 - 5.1 mmol/L Chloride 109 (H) 97 - 108 mmol/L  
 CO2 29 21 - 32 mmol/L Anion gap 8 5 - 15 mmol/L Glucose 122 (H) 65 - 100 mg/dL BUN 21 (H) 6 - 20 MG/DL Creatinine 1.45 (H) 0.55 - 1.02 MG/DL  
 BUN/Creatinine ratio 14 12 - 20 GFR est AA 44 (L) >60 ml/min/1.73m2 GFR est non-AA 36 (L) >60 ml/min/1.73m2 Calcium 8.3 (L) 8.5 - 10.1 MG/DL Bilirubin, total 0.9 0.2 - 1.0 MG/DL  
 ALT (SGPT) 23 12 - 78 U/L  
 AST (SGOT) 24 15 - 37 U/L Alk. phosphatase 161 (H) 45 - 117 U/L Protein, total 6.6 6.4 - 8.2 g/dL Albumin 3.2 (L) 3.5 - 5.0 g/dL Globulin 3.4 2.0 - 4.0 g/dL A-G Ratio 0.9 (L) 1.1 - 2.2 CK W/ REFLX CKMB Collection Time: 11/12/18  6:48 AM  
Result Value Ref Range CK 50 26 - 192 U/L  
TROPONIN I Collection Time: 11/12/18  6:48 AM  
Result Value Ref Range Troponin-I, Qt. 0.08 (H) <0.05 ng/mL SAMPLES BEING HELD Collection Time: 11/12/18  6:48 AM  
Result Value Ref Range SAMPLES BEING HELD 1BLUE   
 COMMENT Add-on orders for these samples will be processed based on acceptable specimen integrity and analyte stability, which may vary by analyte. CK  
 Collection Time: 11/12/18  6:48 AM  
Result Value Ref Range CK 50 26 - 192 U/L  
LIPASE Collection Time: 11/12/18  6:48 AM  
Result Value Ref Range Lipase 152 73 - 393 U/L  
NT-PRO BNP Collection Time: 11/12/18  7:15 AM  
Result Value Ref Range NT pro-BNP 9,672 (H) 0 - 125 PG/ML  
D DIMER Collection Time: 11/12/18  7:15 AM  
Result Value Ref Range D-dimer 0.79 (H) 0.00 - 0.65 mg/L FEU PROTHROMBIN TIME + INR Collection Time: 11/12/18  7:17 AM  
Result Value Ref Range INR 1.4 (H) 0.9 - 1.1 Prothrombin time 14.4 (H) 9.0 - 11.1 sec PTT Collection Time: 11/12/18  7:17 AM  
Result Value Ref Range aPTT 50.7 (H) 22.1 - 32.0 sec  
 aPTT, therapeutic range     58.0 - 77.0 SECS  
MAGNESIUM Collection Time: 11/12/18  7:17 AM  
Result Value Ref Range Magnesium 1.8 1.6 - 2.4 mg/dL TROPONIN I Collection Time: 11/12/18  8:51 AM  
Result Value Ref Range Troponin-I, Qt. 0.08 (H) <0.05 ng/mL URINALYSIS W/ RFLX MICROSCOPIC Collection Time: 11/12/18  9:41 AM  
Result Value Ref Range Color YELLOW/STRAW Appearance CLEAR CLEAR Specific gravity 1.008 1.003 - 1.030    
 pH (UA) 6.5 5.0 - 8.0 Protein TRACE (A) NEG mg/dL Glucose NEGATIVE  NEG mg/dL Ketone NEGATIVE  NEG mg/dL Bilirubin NEGATIVE  NEG Blood NEGATIVE  NEG Urobilinogen 0.2 0.2 - 1.0 EU/dL Nitrites NEGATIVE  NEG Leukocyte Esterase TRACE (A) NEG    
 WBC 0-4 0 - 4 /hpf  
 RBC 0-5 0 - 5 /hpf Epithelial cells FEW FEW /lpf Bacteria NEGATIVE  NEG /hpf Hyaline cast 0-2 0 - 5 /lpf  
GLUCOSE, POC Collection Time: 11/12/18 12:52 PM  
Result Value Ref Range Glucose (POC) 113 (H) 65 - 100 mg/dL Performed by Nadya Taylor   
TROPONIN I  Collection Time: 11/12/18  2:58 PM  
 Result Value Ref Range Troponin-I, Qt. 0.06 (H) <0.05 ng/mL Imaging Reviewed:  
 
Nm Lung Vent/perf Result Date: 11/12/2018 IMPRESSION: Very low probability for pulmonary embolism. Ct Head Wo Cont Result Date: 11/12/2018 IMPRESSION: No acute abnormality. [id number] Result Date: 11/12/2018 Impression: Cholelithiasis with mild gallbladder wall thickening and tenderness in the region of the gallbladder concerning for acute cholecystitis. Small right pleural effusion. Cortical thinning right kidney compatible with medical renal disease. Xr Chest [facility] Result Date: 11/12/2018 IMPRESSION: Small right pleural effusion suspected. No other evidence of acute finding. Assessment:  
 
Active Problems: Hypokalemia (10/7/2018) Acute on chronic systolic CHF (congestive heart failure) ([address] (11/12/2018) Plan:  
 
The objective of todays visit was to review the transitional care plan with the patient. We discussed the importance of transitional care as it relates to reducing the likelihood of readmission. We reviewed the goals for the first 30 days following hospital discharge. The patient and I discussed wrap around services including nurse navigation, care coordination, home health, transitional care appointments with their primary care provider and specialist team and the role of dispatch health. Patient education focused on readmission zones as described as The Red Zone: High risk for readmission, days 1-21 The Yellow Zone: Moderate risk for readmission, days 22-29 The Green Zone: Lower risk for readmission, days 30 and after 1. Extensive conversation on POST completed today with all copies distributed to to patient  and EMR. Pt informed to keep all copies in a safe, easily accessible location. Updated on portability and the necessity of possible limitations of documents if she travels out of state. Requested she give copies to all mPOAs. Patient  given opportunity to ask  questions and information given during this assessment. My contact information given to patient if any concerns or questions. 2. Available as needed, for readmission 3. Nurse updated on change of code status. The patient expressed understanding of the disease process and management plan, and all questions were answered. Greater than 25 minutes were spent in patient management, greater than half of which was spent in counseling and coordination of care. 
   
Signed By: Deborah Harper NP November 12, 2018

## 2018-11-12 NOTE — H&P
Hospitalist Admission NoteNAME: Larui Guerrero :  1950 MRN:  [id number] Date/Time:  2018 9:54 AM 
 
Patient PCP: Jennie De Leon MD  
Cardiology:  Dr. Ave Guerrero 
______________________________________________________________________ Assessment & Plan: Dyspnea due to acute on chronic systolic chf EF 40-88%. S/p ICD 10/2018 CXR personally reviewed, mild pulmonary edema and small right pleural effusion. probnp [id number] which is up from [address] when she was intubated. Weight gain 11 lbs from when discharged 10/2018 by hospital record 
--given 0.5mg IV bumex by ER. Have ordered additional 1mg IV bumex now and then bumex 1mg IV bid 
--daily weight, strict I/o 
--not hypoxic, 94% RA 
--follow up on V/Q scan but low suspicion for PE since she is on pradaxa. --cardiology consult Accelerated HTN, POA /107 Chronic HTN 
--Labetalol 20mg IV x 1. Nitroglycerin drip to get SBP <160/90 
--increase toprol XL from 50mg to 100mg 
--continue lisinopril 5mg Subxiphoid chest pain Upper abdominal pain b/l Hx GERD Possible acute cholecystitis 
--general surgery consulted by ER. Discussed with Dr. Darcie Hicks 
--clear liquid diet, empiric abx with levaquin and flagyl 
--HIDA scan tomorrow. Cannot do today due to V/Q scan done today 
--monitor LFTs. Alk phos elevated 161 
--currently not on medication for gerd. Try GI cocktail x 1 and start protonix 40mg daily Elevated troponin 0.08 Hx CAD s/p stent 
--continue aspirin, lipitor. --toprol adjusted as above --chest pain seems more GI related rather than cardiac. Repeat cardiac enzymes. Try protonix Hx afib, s/p pacemaker/ICD 
--Toprol xl increased as above 
--hold pradaxa due to potential need for cholecystectomy. Per Dr. Darcie Hicks, earliest she could have cholecystectomy will be Thursday. Discussed with ER pharmacist Liliana Moore re: lovenox vs. Heparin.   Liliana Moore recommends heparin due to patient's CrCl 39. Would stop heparin drip 6 hours before surgery SHAYLA 
--continue home cpap Hx DM 
--currently not on medication 
--diabetic clear liquid diet Obesity Body mass index is 35.84 kg/m². Code: discussed with patient and . She needs POST redone. If she is pulseless and not breathing, wants DNR, but if she has pulse and is breathing, wants full resuscitative measures including intubation. Palliative care consult placed. DVT prophylaxis: heparin drip Surrogate decision maker:   Aneesh Gutierrez Dr. Tyler Martinez to assume care in AM  
  
 
Subjective: CHIEF COMPLAINT:  SOB, subxiphoid chest pain, epigastric pain, headache, elevated blood pressure HISTORY OF PRESENT ILLNESS:    
Joey Collins is a 79 y.o.  female with CAD, systolic chf EF 16%, afib, SHAYLA on cpap at home, depression came in with SOB, worse with laying down, nonproductive cough. Also headache in association with uncontrolled BP, was 184/125 at home per . He reports frustration with BP uncontrolled and that patient's BP meds were significantly reduced at last discharge (lisinopril used to take 20mg, was 2.5mg at discharge, increased up to 5mg; amlodipine 5mg stopped. Toprol XL used to take 100mg, reduced to 25mg at discharge, now up to 50mg). Patient also complains of substernal/subxiphoid chest pain with epigastric pain, dull 8/10, +nausea, no aggravating or relieving factors. Patient admitted 10/2018 with chf exacerbation, afib with rvr, respiratory failure requiring intubation. Discharged to [facility] and subsequently home. We were asked to admit for work up and evaluation of the above problems. Past Medical History:  
Diagnosis Date  Anxiety 1/22/2018  Arthritis OA  Asthma  Diabetes ([address]  Essential hypertension  GERD (gastroesophageal reflux disease)  Hypercholesterolemia 1/22/2018  Hypertension  Long-term use of high-risk medication 1/22/2018  Neuropathy  Other ill-defined conditions(799.89) IBS, spinal stenosis  Plantar fasciitis  Psychiatric disorder   
 depression, anxiety  Sleep apnea   
 uses CPAP  Type 2 diabetes mellitus with diabetic neuropathy, without long-term current use of insulin ([facility] 6/5/2016 Past Surgical History:  
Procedure Laterality Date  CARDIAC SURG PROCEDURE UNLIST    
 stent  HX APPENDECTOMY  HX HYSTERECTOMY  HX PACEMAKER Social History Tobacco Use  Smoking status: Never Smoker  Smokeless tobacco: Never Used Substance Use Topics  Alcohol use: No  
 
Family History Problem Relation Age of Onset 24 [facility] Arthritis-osteo Mother  Hypertension Mother  High Cholesterol Mother  Crohn's Disease Mother  Heart Disease Mother  Alcohol abuse Father  High Cholesterol Sister  Hypertension Sister  Thyroid Disease Sister  COPD Sister  High Cholesterol Brother  Hypertension Brother  COPD Brother  COPD Child  Inflammatory Bowel Dz Child Allergies Allergen Reactions  Sulfa (Sulfonamide Antibiotics) Swelling  Amoxicillin Swelling Prior to Admission medications Medication Sig Start Date End Date Taking? Authorizing Provider  
clonazePAM (KLONOPIN) 0.5 mg tablet TAKE 1 TABLET EVERY NIGHT. MAX DAILY DOSE OF 0.5MG. 10/22/18  Yes Khanh Daniels MD  
potassium chloride SR (KLOR-CON 10) 10 mEq tablet Take 1 Tab by mouth daily. 10/22/18  Yes Khanh Daniels MD  
bumetanide (BUMEX) 1 mg tablet Take 1 Tab by mouth daily. Patient taking differently: Take 2 mg by mouth daily. 10/20/18 10/20/19 Yes Freda Daly MD  
lisinopril (PRINIVIL, ZESTRIL) 2.5 mg tablet Take 1 Tab by mouth daily. Patient taking differently: Take 5 mg by mouth daily. 10/20/18  Yes Freda Daly MD  
metoprolol succinate (TOPROL-XL) 25 mg XL tablet Take 1 Tab by mouth daily. Patient taking differently: Take 50 mg by mouth daily. 10/20/18  Yes Leisa Vieira MD  
venlafaxine-SR (EFFEXOR XR) 150 mg capsule Take 150 mg by mouth two (2) times daily (with meals). Yes Provider, Historical  
dabigatran etexilate (PRADAXA) 150 mg capsule Take 150 mg by mouth two (2) times a day. Yes Caden, MD Ernei  
pregabalin (LYRICA) 200 mg capsule Take 200 mg by mouth two (2) times a day. Yes Other, MD Ernie  
cholecalciferol, vitamin d3, (VITAMIN D3) 400 unit cap Take 400 Units by mouth daily. Yes Caden, MD Ernie  
aspirin 81 mg chewable tablet Take 81 mg by mouth daily. Yes Provider, Historical  
atorvastatin (LIPITOR) 40 mg tablet Take 1 Tab by mouth nightly. 14  Yes Leisa Vieira MD  
 
REVIEW OF SYSTEMS:  POSITIVE= Bold. Negative = normal text General:  fever, chills, sweats, generalized weakness, weight loss/gain, loss of appetite Eyes:  blurred vision, eye pain, loss of vision, diplopia Ear Nose and Throat:  rhinorrhea, pharyngitis Respiratory:   cough, sputum production, SOB, wheezing, STEEN, pleuritic pain 
Cardiology:  chest pain, palpitations, orthopnea, PND, edema, syncope Gastrointestinal:  abdominal pain, Nausea/V, dysphagia, diarrhea, constipation, bleeding Genitourinary:  frequency, urgency, dysuria, hematuria, incontinence Muskuloskeletal :  arthralgia, myalgia Hematology:  easy bruising, bleeding, lymphadenopathy Dermatological:  rash, ulceration, pruritis Endocrine:  hot flashes or polydipsia Neurological:  headache, dizziness, confusion, focal weakness, paresthesia, memory loss, gait disturbance Psychological: anxiety, depression, agitation Objective: VITALS:   
Visit Vitals BP (!) 183/107 Pulse 80 Temp 97.5 °F (36.4 °C) Resp 18 Ht 5' 9\" (1.753 m) Wt 110.1 kg (242 lb 11.6 oz) SpO2 100% BMI 35.84 kg/m² Temp (24hrs), Av.7 °F (36.5 °C), Min:97.5 °F (36.4 °C), Max:97.8 °F (36.6 °C) Wt Readings from Last 25 Encounters: 11/12/18 110.1 kg (242 lb 11.6 oz) 10/18/18 104.9 kg (231 lb 4.2 oz) 09/10/18 107.5 kg (237 lb) 08/07/18 105.7 kg (233 lb) 08/01/18 108 kg (238 lb)  
03/14/18 118 kg (260 lb 3 oz) 02/22/18 119.3 kg (263 lb)  
01/22/18 104.6 kg (230 lb 9.6 oz) Body mass index is 35.84 kg/m². PHYSICAL EXAM: 
 
General:    Alert, obese, cooperative, no distress, appears stated age. HEENT: Atraumatic, anicteric sclerae, pink conjunctivae No oral ulcers, mucosa dry, throat clear. Hearing intact. Neck:  Supple, symmetrical,  thyroid: non tender Lungs:   Crackles in bases. No Wheezing or Rhonchi. Chest wall:  No tenderness  No Accessory muscle use. Heart:   Regular  rhythm,  No  murmur   No gallop. 1+ lower leg edema. Abdomen:   RUQ, epigastric and LUQ tenderness, no guarding or rebound. Soft  Not distended. Bowel sounds normal. No masses Extremities: No cyanosis. No clubbing Skin:     Not pale Not Jaundiced  No rashes Psych:  Good insight. Not depressed. Not anxious or agitated. Neurologic: EOMs intact. No facial asymmetry. No aphasia or slurred speech. Symmetrical strength, Alert and oriented X 3. IMAGING RESULTS: 
 []       I have personally reviewed the actual   []     CXR  []     CT scan CXR: reviewed, pacemaker, borderline cardiomegaly, mild pulmonary edema, small right pleural effusion CT : 
EKG: 
 ________________________________________________________________________ Care Plan discussed with: 
  Comments Patient y Family  y  RN Care Manager Consultant:  sol Spence [address] [facility] [address] Nixon Lucero  
________________________________________________________________________ Prophylaxis: 
GI protonix DVT Heparin drip  
________________________________________________________________________ Recommended Disposition:  
Home with Family y HH/PT/OT/RN y  
SNF/LTC   
LISA   
________________________________________________________________________ Code Status: Full Code y  
DNR/DNI   
________________________________________________________________________ TOTAL TIME:  65 minutes Comments  
 y Reviewed previous records  
 
 
______________________________________________________________________ Curtistine MD King 
 
 
Procedures: see electronic medical records for all procedures/Xrays and details which were not copied into this note but were reviewed prior to creation of Plan. LAB DATA REVIEWED:   
Recent Results (from the past 24 hour(s)) EKG, 12 LEAD, INITIAL Collection Time: 11/12/18  6:18 AM  
Result Value Ref Range Ventricular Rate 80 BPM  
 Atrial Rate 80 BPM  
 P-R Interval 216 ms  
 QRS Duration 178 ms Q-T Interval 484 ms QTC Calculation (Bezet) 558 ms Calculated P Axis 68 degrees Calculated R Axis -119 degrees Calculated T Axis 56 degrees Diagnosis Suspect unspecified pacemaker failure Atrial-sensed ventricular-paced rhythm with prolonged AV conduction When compared with ECG of 06-OCT-2018 15:35, Electronic ventricular pacemaker has replaced Atrial fibrillation Vent. rate has decreased BY  49 BPM 
  
CBC WITH AUTOMATED DIFF Collection Time: 11/12/18  6:48 AM  
Result Value Ref Range WBC 6.3 3.6 - 11.0 K/uL  
 RBC 4.02 3.80 - 5.20 M/uL  
 HGB 11.6 11.5 - 16.0 g/dL HCT 37.5 35.0 - 47.0 % MCV 93.3 80.0 - 99.0 FL  
 MCH 28.9 26.0 - 34.0 PG  
 MCHC 30.9 30.0 - 36.5 g/dL  
 RDW 18.3 (H) 11.5 - 14.5 % PLATELET 164 872 - 603 K/uL MPV 11.6 8.9 - 12.9 FL  
 NRBC 0.0 0  WBC ABSOLUTE NRBC 0.00 0.00 - 0.01 K/uL NEUTROPHILS 68 32 - 75 % LYMPHOCYTES 21 12 - 49 % MONOCYTES 7 5 - 13 % EOSINOPHILS 3 0 - 7 % BASOPHILS 0 0 - 1 % IMMATURE GRANULOCYTES 1 (H) 0.0 - 0.5 % ABS. NEUTROPHILS 4.3 1.8 - 8.0 K/UL  
 ABS. LYMPHOCYTES 1.3 0.8 - 3.5 K/UL  
 ABS. MONOCYTES 0.5 0.0 - 1.0 K/UL  
 ABS. EOSINOPHILS 0.2 0.0 - 0.4 K/UL  
 ABS. BASOPHILS 0.0 0.0 - 0.1 K/UL ABS. IMM. GRANS. 0.0 0.00 - 0.04 K/UL  
 DF AUTOMATED METABOLIC PANEL, COMPREHENSIVE Collection Time: 11/12/18  6:48 AM  
Result Value Ref Range Sodium 146 (H) 136 - 145 mmol/L Potassium 2.8 (L) 3.5 - 5.1 mmol/L Chloride 109 (H) 97 - 108 mmol/L  
 CO2 29 21 - 32 mmol/L Anion gap 8 5 - 15 mmol/L Glucose 122 (H) 65 - 100 mg/dL BUN 21 (H) 6 - 20 MG/DL Creatinine 1.45 (H) 0.55 - 1.02 MG/DL  
 BUN/Creatinine ratio 14 12 - 20 GFR est AA 44 (L) >60 ml/min/1.73m2 GFR est non-AA 36 (L) >60 ml/min/1.73m2 Calcium 8.3 (L) 8.5 - 10.1 MG/DL Bilirubin, total 0.9 0.2 - 1.0 MG/DL  
 ALT (SGPT) 23 12 - 78 U/L  
 AST (SGOT) 24 15 - 37 U/L Alk. phosphatase 161 (H) 45 - 117 U/L Protein, total 6.6 6.4 - 8.2 g/dL Albumin 3.2 (L) 3.5 - 5.0 g/dL Globulin 3.4 2.0 - 4.0 g/dL A-G Ratio 0.9 (L) 1.1 - 2.2 CK W/ REFLX CKMB Collection Time: 11/12/18  6:48 AM  
Result Value Ref Range CK 50 26 - 192 U/L  
TROPONIN I Collection Time: 11/12/18  6:48 AM  
Result Value Ref Range Troponin-I, Qt. 0.08 (H) <0.05 ng/mL SAMPLES BEING HELD Collection Time: 11/12/18  6:48 AM  
Result Value Ref Range SAMPLES BEING HELD 1BLUE   
 COMMENT Add-on orders for these samples will be processed based on acceptable specimen integrity and analyte stability, which may vary by analyte. CK  
 Collection Time: 11/12/18  6:48 AM  
Result Value Ref Range CK 50 26 - 192 U/L  
LIPASE Collection Time: 11/12/18  6:48 AM  
Result Value Ref Range Lipase 152 73 - 393 U/L  
NT-PRO BNP Collection Time: 11/12/18  7:15 AM  
Result Value Ref Range NT pro-BNP 9,672 (H) 0 - 125 PG/ML  
D DIMER Collection Time: 11/12/18  7:15 AM  
Result Value Ref Range D-dimer 0.79 (H) 0.00 - 0.65 mg/L FEU PROTHROMBIN TIME + INR Collection Time: 11/12/18  7:17 AM  
Result Value Ref Range INR 1.4 (H) 0.9 - 1.1 Prothrombin time 14.4 (H) 9.0 - 11.1 sec PTT Collection Time: 11/12/18  7:17 AM  
Result Value Ref Range aPTT 50.7 (H) 22.1 - 32.0 sec  
 aPTT, therapeutic range     58.0 - 77.0 SECS  
MAGNESIUM Collection Time: 11/12/18  7:17 AM  
Result Value Ref Range Magnesium 1.8 1.6 - 2.4 mg/dL TROPONIN I Collection Time: 11/12/18  8:51 AM  
Result Value Ref Range Troponin-I, Qt. 0.08 (H) <0.05 ng/mL

## 2018-11-13 ENCOUNTER — ANESTHESIA EVENT (OUTPATIENT)
Dept: SURGERY | Age: 68
DRG: 987 | End: 2018-11-13
Payer: MEDICARE

## 2018-11-13 ENCOUNTER — APPOINTMENT (OUTPATIENT)
Dept: NUCLEAR MEDICINE | Age: 68
DRG: 987 | End: 2018-11-13
Attending: SURGERY
Payer: MEDICARE

## 2018-11-13 PROBLEM — R10.13 EPIGASTRIC ABDOMINAL PAIN: Status: ACTIVE | Noted: 2018-11-13

## 2018-11-13 PROBLEM — N18.30 STAGE 3 CHRONIC KIDNEY DISEASE (HCC): Status: ACTIVE | Noted: 2018-11-13

## 2018-11-13 PROBLEM — I25.5 ISCHEMIC CARDIOMYOPATHY: Status: ACTIVE | Noted: 2018-11-13

## 2018-11-13 PROBLEM — Z95.0 S/P PLACEMENT OF CARDIAC PACEMAKER: Status: ACTIVE | Noted: 2018-11-13

## 2018-11-13 PROBLEM — I25.10 CAD (CORONARY ARTERY DISEASE): Status: ACTIVE | Noted: 2018-11-13

## 2018-11-13 LAB
ALBUMIN SERPL-MCNC: 3 G/DL (ref 3.5–5)
ALBUMIN/GLOB SERPL: 0.9 {RATIO} (ref 1.1–2.2)
ALP SERPL-CCNC: 142 U/L (ref 45–117)
ALT SERPL-CCNC: 21 U/L (ref 12–78)
ANION GAP SERPL CALC-SCNC: 7 MMOL/L (ref 5–15)
APTT PPP: 67.7 SEC (ref 22.1–32)
AST SERPL-CCNC: 16 U/L (ref 15–37)
BASOPHILS # BLD: 0 K/UL (ref 0–0.1)
BASOPHILS NFR BLD: 0 % (ref 0–1)
BILIRUB SERPL-MCNC: 0.7 MG/DL (ref 0.2–1)
BUN SERPL-MCNC: 16 MG/DL (ref 6–20)
BUN/CREAT SERPL: 11 (ref 12–20)
CALCIUM SERPL-MCNC: 8 MG/DL (ref 8.5–10.1)
CHLORIDE SERPL-SCNC: 107 MMOL/L (ref 97–108)
CO2 SERPL-SCNC: 31 MMOL/L (ref 21–32)
CREAT SERPL-MCNC: 1.4 MG/DL (ref 0.55–1.02)
DIFFERENTIAL METHOD BLD: ABNORMAL
EOSINOPHIL # BLD: 0.2 K/UL (ref 0–0.4)
EOSINOPHIL NFR BLD: 3 % (ref 0–7)
ERYTHROCYTE [DISTWIDTH] IN BLOOD BY AUTOMATED COUNT: 18.3 % (ref 11.5–14.5)
GLOBULIN SER CALC-MCNC: 3.3 G/DL (ref 2–4)
GLUCOSE BLD STRIP.AUTO-MCNC: 104 MG/DL (ref 65–100)
GLUCOSE BLD STRIP.AUTO-MCNC: 123 MG/DL (ref 65–100)
GLUCOSE BLD STRIP.AUTO-MCNC: 124 MG/DL (ref 65–100)
GLUCOSE BLD STRIP.AUTO-MCNC: 98 MG/DL (ref 65–100)
GLUCOSE SERPL-MCNC: 105 MG/DL (ref 65–100)
HCT VFR BLD AUTO: 36.2 % (ref 35–47)
HGB BLD-MCNC: 11 G/DL (ref 11.5–16)
IMM GRANULOCYTES # BLD: 0 K/UL (ref 0–0.04)
IMM GRANULOCYTES NFR BLD AUTO: 1 % (ref 0–0.5)
LYMPHOCYTES # BLD: 1.7 K/UL (ref 0.8–3.5)
LYMPHOCYTES NFR BLD: 28 % (ref 12–49)
MAGNESIUM SERPL-MCNC: 1.7 MG/DL (ref 1.6–2.4)
MCH RBC QN AUTO: 28.9 PG (ref 26–34)
MCHC RBC AUTO-ENTMCNC: 30.4 G/DL (ref 30–36.5)
MCV RBC AUTO: 95 FL (ref 80–99)
MONOCYTES # BLD: 0.5 K/UL (ref 0–1)
MONOCYTES NFR BLD: 8 % (ref 5–13)
NEUTS SEG # BLD: 3.6 K/UL (ref 1.8–8)
NEUTS SEG NFR BLD: 60 % (ref 32–75)
NRBC # BLD: 0 K/UL (ref 0–0.01)
NRBC BLD-RTO: 0 PER 100 WBC
PLATELET # BLD AUTO: 206 K/UL (ref 150–400)
PMV BLD AUTO: 11.4 FL (ref 8.9–12.9)
POTASSIUM SERPL-SCNC: 3.8 MMOL/L (ref 3.5–5.1)
PROT SERPL-MCNC: 6.3 G/DL (ref 6.4–8.2)
RBC # BLD AUTO: 3.81 M/UL (ref 3.8–5.2)
SERVICE CMNT-IMP: ABNORMAL
SERVICE CMNT-IMP: NORMAL
SODIUM SERPL-SCNC: 145 MMOL/L (ref 136–145)
THERAPEUTIC RANGE,PTTT: ABNORMAL SECS (ref 58–77)
TROPONIN I SERPL-MCNC: <0.05 NG/ML
WBC # BLD AUTO: 6.1 K/UL (ref 3.6–11)

## 2018-11-13 PROCEDURE — 97116 GAIT TRAINING THERAPY: CPT | Performed by: PHYSICAL THERAPIST

## 2018-11-13 PROCEDURE — 74011250636 HC RX REV CODE- 250/636: Performed by: INTERNAL MEDICINE

## 2018-11-13 PROCEDURE — 85025 COMPLETE CBC W/AUTO DIFF WBC: CPT

## 2018-11-13 PROCEDURE — 80053 COMPREHEN METABOLIC PANEL: CPT

## 2018-11-13 PROCEDURE — 77010033678 HC OXYGEN DAILY

## 2018-11-13 PROCEDURE — 84484 ASSAY OF TROPONIN QUANT: CPT

## 2018-11-13 PROCEDURE — G8988 SELF CARE GOAL STATUS: HCPCS | Performed by: OCCUPATIONAL THERAPIST

## 2018-11-13 PROCEDURE — A9537 TC99M MEBROFENIN: HCPCS

## 2018-11-13 PROCEDURE — 74011250636 HC RX REV CODE- 250/636: Performed by: HOSPITALIST

## 2018-11-13 PROCEDURE — 74011250637 HC RX REV CODE- 250/637: Performed by: HOSPITALIST

## 2018-11-13 PROCEDURE — 97165 OT EVAL LOW COMPLEX 30 MIN: CPT | Performed by: OCCUPATIONAL THERAPIST

## 2018-11-13 PROCEDURE — 82962 GLUCOSE BLOOD TEST: CPT

## 2018-11-13 PROCEDURE — 85730 THROMBOPLASTIN TIME PARTIAL: CPT

## 2018-11-13 PROCEDURE — G8989 SELF CARE D/C STATUS: HCPCS | Performed by: OCCUPATIONAL THERAPIST

## 2018-11-13 PROCEDURE — 74011250636 HC RX REV CODE- 250/636: Performed by: RADIOLOGY

## 2018-11-13 PROCEDURE — 74011250637 HC RX REV CODE- 250/637: Performed by: INTERNAL MEDICINE

## 2018-11-13 PROCEDURE — G8987 SELF CARE CURRENT STATUS: HCPCS | Performed by: OCCUPATIONAL THERAPIST

## 2018-11-13 PROCEDURE — 99232 SBSQ HOSP IP/OBS MODERATE 35: CPT | Performed by: SURGERY

## 2018-11-13 PROCEDURE — 83735 ASSAY OF MAGNESIUM: CPT

## 2018-11-13 PROCEDURE — 94760 N-INVAS EAR/PLS OXIMETRY 1: CPT

## 2018-11-13 PROCEDURE — 36415 COLL VENOUS BLD VENIPUNCTURE: CPT

## 2018-11-13 PROCEDURE — 97161 PT EVAL LOW COMPLEX 20 MIN: CPT | Performed by: PHYSICAL THERAPIST

## 2018-11-13 PROCEDURE — 65660000000 HC RM CCU STEPDOWN

## 2018-11-13 PROCEDURE — 76450000000

## 2018-11-13 PROCEDURE — 74011000250 HC RX REV CODE- 250: Performed by: HOSPITALIST

## 2018-11-13 RX ORDER — BUMETANIDE 1 MG/1
2 TABLET ORAL DAILY
COMMUNITY
End: 2018-11-20 | Stop reason: SDUPTHER

## 2018-11-13 RX ORDER — LIDOCAINE HCL 4 G/100G
CREAM TOPICAL
COMMUNITY
End: 2021-04-21 | Stop reason: ALTCHOICE

## 2018-11-13 RX ORDER — METRONIDAZOLE 500 MG/100ML
500 INJECTION, SOLUTION INTRAVENOUS EVERY 12 HOURS
Status: DISCONTINUED | OUTPATIENT
Start: 2018-11-13 | End: 2018-11-15

## 2018-11-13 RX ORDER — METOPROLOL SUCCINATE 50 MG/1
50 TABLET, EXTENDED RELEASE ORAL DAILY
COMMUNITY
End: 2018-11-18

## 2018-11-13 RX ORDER — CONJUGATED ESTROGENS 0.62 MG/G
0.5 CREAM VAGINAL 2 TIMES WEEKLY
COMMUNITY

## 2018-11-13 RX ORDER — LISINOPRIL 5 MG/1
5 TABLET ORAL DAILY
COMMUNITY
End: 2018-11-18

## 2018-11-13 RX ORDER — ACETAMINOPHEN 500 MG/1
1000 TABLET, FILM COATED ORAL
COMMUNITY
End: 2021-04-21 | Stop reason: ALTCHOICE

## 2018-11-13 RX ORDER — MORPHINE SULFATE 2 MG/ML
2 INJECTION, SOLUTION INTRAMUSCULAR; INTRAVENOUS
Status: COMPLETED | OUTPATIENT
Start: 2018-11-13 | End: 2018-11-13

## 2018-11-13 RX ADMIN — ATORVASTATIN CALCIUM 40 MG: 40 TABLET, FILM COATED ORAL at 21:00

## 2018-11-13 RX ADMIN — VENLAFAXINE HYDROCHLORIDE 150 MG: 150 CAPSULE, EXTENDED RELEASE ORAL at 17:40

## 2018-11-13 RX ADMIN — LISINOPRIL 10 MG: 5 TABLET ORAL at 09:10

## 2018-11-13 RX ADMIN — Medication 10 ML: at 21:00

## 2018-11-13 RX ADMIN — Medication 10 ML: at 03:24

## 2018-11-13 RX ADMIN — METRONIDAZOLE 500 MG: 500 INJECTION, SOLUTION INTRAVENOUS at 16:24

## 2018-11-13 RX ADMIN — ACETAMINOPHEN 650 MG: 325 TABLET ORAL at 09:10

## 2018-11-13 RX ADMIN — METRONIDAZOLE 500 MG: 500 INJECTION, SOLUTION INTRAVENOUS at 03:23

## 2018-11-13 RX ADMIN — VENLAFAXINE HYDROCHLORIDE 150 MG: 150 CAPSULE, EXTENDED RELEASE ORAL at 09:10

## 2018-11-13 RX ADMIN — ONDANSETRON 4 MG: 2 INJECTION INTRAMUSCULAR; INTRAVENOUS at 15:37

## 2018-11-13 RX ADMIN — BUMETANIDE 1 MG: 0.25 INJECTION INTRAMUSCULAR; INTRAVENOUS at 09:56

## 2018-11-13 RX ADMIN — MORPHINE SULFATE 2 MG: 2 INJECTION, SOLUTION INTRAMUSCULAR; INTRAVENOUS at 11:01

## 2018-11-13 RX ADMIN — HEPARIN SODIUM 9.1 UNITS/KG/HR: 10000 INJECTION, SOLUTION INTRAVENOUS at 16:22

## 2018-11-13 RX ADMIN — DOCUSATE SODIUM 100 MG: 100 CAPSULE, LIQUID FILLED ORAL at 17:40

## 2018-11-13 RX ADMIN — PREGABALIN 200 MG: 150 CAPSULE ORAL at 09:00

## 2018-11-13 RX ADMIN — ACETAMINOPHEN 650 MG: 325 TABLET ORAL at 15:37

## 2018-11-13 RX ADMIN — BUMETANIDE 1 MG: 0.25 INJECTION INTRAMUSCULAR; INTRAVENOUS at 17:40

## 2018-11-13 RX ADMIN — CLONAZEPAM 0.5 MG: 0.5 TABLET ORAL at 21:00

## 2018-11-13 RX ADMIN — METOPROLOL SUCCINATE 100 MG: 50 TABLET, EXTENDED RELEASE ORAL at 09:00

## 2018-11-13 RX ADMIN — Medication 10 ML: at 14:00

## 2018-11-13 RX ADMIN — DOCUSATE SODIUM 100 MG: 100 CAPSULE, LIQUID FILLED ORAL at 09:10

## 2018-11-13 RX ADMIN — PANTOPRAZOLE SODIUM 40 MG: 40 TABLET, DELAYED RELEASE ORAL at 09:10

## 2018-11-13 RX ADMIN — POTASSIUM CHLORIDE 20 MEQ: 750 TABLET, FILM COATED, EXTENDED RELEASE ORAL at 09:00

## 2018-11-13 RX ADMIN — PREGABALIN 200 MG: 150 CAPSULE ORAL at 17:40

## 2018-11-13 NOTE — PROGRESS NOTES
[address] 
[phone] Medical Progress Note NAME: Tari Lind :  1950 MRM:  [id number] Date/Time: 2018  5:44 AM 
  
Subjective:  
 
80 y/o female admitted with CAD, mixed ischemic/tachycardia cardiomyopathy, chronic afib - s/p av node ablation and pacemaker placement admitted with acute on chronic systolic CHF with 11 lb weight gain and elevated BNP since last discharge and epigastric abdominal pain. Started on O2 and IV diuresis with improvement in breathing. Seen by surgery and placed on clear liquids along with IV levaquin and flagyl for possible cholecysititis. Has been seen by cardiology and med adjustment made. Awaiting HIDA scan which couldn't be done due to V/Q scan done on admission. Patient is alert and is in NAD. Complains of epigastric pain. Receiving PPI. Afebrile with WBC 6100. K+ up to 3.8 and creatinine is 1.4 with eGFR of 38. Alk phos slightly elevated. Past Medical History:  
Diagnosis Date  Anxiety 2018  Arthritis OA  Asthma  Diabetes ([address]  Essential hypertension  GERD (gastroesophageal reflux disease)  Hypercholesterolemia 2018  Hypertension  Long-term use of high-risk medication 2018  Neuropathy  Other ill-defined conditions(799.89) IBS, spinal stenosis  Plantar fasciitis  Psychiatric disorder   
 depression, anxiety  Sleep apnea   
 uses CPAP  Type 2 diabetes mellitus with diabetic neuropathy, without long-term current use of insulin ([address] 2016 ROS:  General: negative for fever, chills, sweats, weakness Respiratory:  positive for SOB with activity Cardiology:  negative for chest pain, palpitations, orthopnea, PND, edema, syncope Gastrointestinal: positive for epigastric abdominal pain without N/V Genitourinary: negative for frequency, urgency, dysuria, hematuria, incontinence Muskuloskeletal : negative for arthralgia, myalgia Dermatological: negative for rash, ulceration, mole change, new lesion Neurological: negative for headache, dizziness, confusion, focal weakness, paresthesia, memory loss, gait disturbance Psychological: negative for anxiety, depression, agitation Review of systems otherwise negative Objective:  
 
 
Vitals:  
 
  
Last 24hrs VS reviewed since prior progress note. Most recent are: 
 
Visit Vitals /89 (BP 1 Location: Left arm, BP Patient Position: At rest) Pulse 78 Temp 97.5 °F (36.4 °C) Resp 16 Ht 5' 9\" (1.753 m) Wt 232 lb 11.2 oz (105.6 kg) SpO2 98% BMI 34.36 kg/m² SpO2 Readings from Last 6 Encounters:  
11/13/18 98% 10/22/18 99% 09/10/18 96% 08/07/18 94% 08/01/18 96% 03/14/18 96% O2 Flow Rate (L/min): 2 l/min Intake/Output Summary (Last 24 hours) at 11/13/2018 0544 Last data filed at 11/13/2018 5385 Gross per 24 hour Intake 818.17 ml Output 1650 ml Net -831.83 ml Telemetry reviewed:   normal sinus rhythm, Paced Tubes:   N/A 
X-Ray:  Chest xray - Small right pleural effusion suspected. No other evidence of acute finding CT head - no acute abnormality U/S abdomen - Cholelithiasis with mild gallbladder wall thickening and tenderness 
in the region of the gallbladder concerning for acute cholecystitis. Small right 
pleural effusion. Cortical thinning right kidney compatible with medical renal 
Disease. NM V/Q scan - Very low probability for pulmonary embolism Procedures:   N/A Exam:  
 
General   well developed, well nourished, appears stated age, in no acute distress Neck   Supple without nodes or mass. No thyromegaly or bruit Respiratory   Rales in both bases with wheezes Cardiology  Regular Rate and Rythmn  - no murmurs, rubs or gallops Abdominal  Soft, non-tender, non-distended, positive bowel sounds, no hepatosplenomegaly Extremities  1+ edema present Skin  Normal skin turgor. No rashes or skin ulcers noted Neurological  No focal neurological deficits noted Psychological  Oriented x 3. Normal affect. Exam otherwise negative Lab Data Reviewed: (see below) Medications Reviewed: (see below) 
 
______________________________________________________________________ Medications:  
 
Current Facility-Administered Medications Medication Dose Route Frequency  sodium chloride (NS) flush 5-10 mL  5-10 mL IntraVENous Q8H  
 sodium chloride (NS) flush 5-10 mL  5-10 mL IntraVENous PRN  
 acetaminophen (TYLENOL) tablet 650 mg  650 mg Oral Q6H PRN  
 fentaNYL citrate (PF) injection 50 mcg  50 mcg IntraVENous Q6H PRN  
 levoFLOXacin (LEVAQUIN) 750 mg in D5W IVPB  750 mg IntraVENous Q48H  
 metroNIDAZOLE (FLAGYL) IVPB premix 500 mg  500 mg IntraVENous Q8H  
 oxyCODONE IR (ROXICODONE) tablet 5 mg  5 mg Oral Q6H PRN  
 ondansetron (ZOFRAN) injection 4 mg  4 mg IntraVENous Q6H PRN  
 docusate sodium (COLACE) capsule 100 mg  100 mg Oral BID  atorvastatin (LIPITOR) tablet 40 mg  40 mg Oral QHS  clonazePAM (KlonoPIN) tablet 0.5 mg  0.5 mg Oral QHS  potassium chloride SR (KLOR-CON 10) tablet 20 mEq  20 mEq Oral DAILY  pregabalin (LYRICA) capsule 200 mg  200 mg Oral BID  venlafaxine-SR (EFFEXOR-XR) capsule 150 mg  150 mg Oral BID WITH MEALS  metoprolol succinate (TOPROL-XL) XL tablet 100 mg  100 mg Oral DAILY  bumetanide (BUMEX) injection 1 mg  1 mg IntraVENous BID  heparin 25,000 units in D5W 250 ml infusion  9.1-25 Units/kg/hr IntraVENous TITRATE  pantoprazole (PROTONIX) tablet 40 mg  40 mg Oral ACB  heparin (porcine) injection 4,000 Units  4,000 Units IntraVENous PRN  
 heparin (porcine) injection 2,000 Units  2,000 Units IntraVENous PRN  
 insulin lispro (HUMALOG) injection   SubCUTAneous AC&HS  
 glucose chewable tablet 16 g  4 Tab Oral PRN  
 dextrose (D50W) injection syrg 12.5-25 g  12.5-25 g IntraVENous PRN  
  glucagon (GLUCAGEN) injection 1 mg  1 mg IntraMUSCular PRN  
 labetalol (NORMODYNE;TRANDATE) injection 10 mg  10 mg IntraVENous Q2H PRN  
 lisinopril (PRINIVIL, ZESTRIL) tablet 10 mg  10 mg Oral DAILY  nitroglycerin (NITROBID) 2 % ointment 1 Inch  1 Inch Topical Q6H PRN  
 hydrALAZINE (APRESOLINE) 20 mg/mL injection 10 mg  10 mg IntraVENous Q4H PRN Lab Review:  
 
Recent Labs 11/13/18 
0107 11/12/18 
2832 WBC 6.1 6.3 HGB 11.0* 11.6 HCT 36.2 37.5  205 Recent Labs 11/13/18 
0107 11/12/18 
3972 11/12/18 
5326   --  146*  
K 3.8  --  2.8*  
  --  109* CO2 31  --  29 *  --  122* BUN 16  --  21* CREA 1.40*  --  1.45* CA 8.0*  --  8.3*  
MG 1.7 1.8  --   
ALB 3.0*  --  3.2* TBILI 0.7  --  0.9 SGOT 16  --  24 ALT 21  --  23 INR  --  1.4*  --   
 
Lab Results Component Value Date/Time Glucose (POC) 112 (H) 11/12/2018 08:45 PM  
 Glucose (POC) 108 (H) 11/12/2018 05:35 PM  
 Glucose (POC) 113 (H) 11/12/2018 12:52 PM  
 Glucose (POC) 89 10/22/2018 11:10 AM  
 Glucose (POC) 132 (H) 10/22/2018 07:40 AM  
 
No results for input(s): PH, PCO2, PO2, HCO3, FIO2 in the last 72 hours. Recent Labs 11/12/18 
4538 INR 1.4* Assessment:  
 
Principal Problem: 
  Acute on chronic systolic CHF (congestive heart failure) ([facility] (11/12/2018) Active Problems: 
  HBP (high blood pressure) (2/22/2013) Chronic atrial fibrillation ([facility] (6/5/2016) Ischemic cardiomyopathy (11/13/2018) CAD (coronary artery disease) (11/13/2018) Stage 3 chronic kidney disease ([address] (11/13/2018) Hypokalemia (10/7/2018) Epigastric abdominal pain (11/13/2018) S/P placement of cardiac pacemaker (11/13/2018) Plan:  
 
Risk of deterioration: medium 1. Continue IV diuresis 2. Monitor renal function 3. Continue clear liquids 4. IV levaquin and flagyl 5. On heparin drip 6. Await HIDA scan 7. Cardiology and surgery help appreciated Care Plan discussed with: Patient Discussed:  Care Plan Prophylaxis:  H2B/PPI and heparin drip Disposition:  Home w/Family 
        
___________________________________________________ Attending Physician: Narayan Hall MD

## 2018-11-13 NOTE — PROGRESS NOTES
Bedside shift change report given to Noris Schuler RN (oncoming nurse). Report included the following information SBAR. SHIFT SUMMARY: 
 
 
 
 
 
[address] NURSING NOTE Admission Date 11/12/2018 Admission Diagnosis Acute on chronic systolic CHF (congestive heart failure) ([facility] Hypokalemia Consults IP CONSULT TO GENERAL SURGERY 
IP CONSULT TO PALLIATIVE CARE - PROVIDER 
IP CONSULT TO CARDIOLOGY Cardiac Monitoring [x] Yes [] No  
  
Purposeful Hourly Rounding [x] Yes   
Ananya Score Total Score: 4 Ananya score 3 or > [x] Bed Alarm [] Avasys [] 1:1 sitter [] Patient refused (Signed refusal form in chart) Preet Score Preet Score: 23 Preet score 14 or < [] PMT consult [] Wound Care consult  
 []  Specialty bed  [] Nutrition consult Influenza Vaccine Received Flu Vaccine for Current Season (usually Sept-March): Yes Oxygen needs? [] Room air Oxygen @  []1L    [x]2L    []3L   []4L    []5L   []6L via NC Chronic home O2 use? [] Yes [x] No 
Perform O2 challenge test and document in progress note using smart[employer]Homeoxygen) Last bowel movement Urinary Catheter LDAs Peripheral IV 11/12/18 Left Hand (Active) Site Assessment Clean, dry, & intact 11/12/2018  7:32 AM  
Phlebitis Assessment 0 11/12/2018  7:32 AM  
Infiltration Assessment 0 11/12/2018  7:32 AM  
Dressing Status Clean, dry, & intact 11/12/2018  7:32 AM  
Dressing Type Transparent 11/12/2018  7:32 AM  
   
Peripheral IV 11/12/18 Right Antecubital (Active) Site Assessment Clean, dry, & intact 11/12/2018 12:14 PM  
Phlebitis Assessment 0 11/12/2018 12:14 PM  
Infiltration Assessment 0 11/12/2018 12:14 PM  
Dressing Status Clean, dry, & intact 11/12/2018 12:14 PM  
Hub Color/Line Status Pink 11/12/2018 12:14 PM  
                  
  
Readmission Risk Assessment Tool Score High Risk   
      
 26 Total Score 3 Has Seen PCP in Last 6 Months (Yes=3, No=0) 4 IP Visits Last 12 Months (1-3=4, 4=9, >4=11) 5 Pt. Coverage (Medicare=5 , Medicaid, or Self-Pay=4) 14 Charlson Comorbidity Score (Age + Comorbid Conditions) Criteria that do not apply:  
 . Living with Significant Other. Assisted Living. LTAC. SNF. or  
Rehab Patient Length of Stay (>5 days = 3) Expected Length of Stay - - - Actual Length of Stay 0

## 2018-11-13 NOTE — PROGRESS NOTES
Cardiology Progress Note 11/13/2018     Admit Date: 11/12/2018 Admit Diagnosis: Acute on chronic systolic CHF (congestive heart failure) ([address] Hypokalemia  CC: none currently Cardiac Assessment/Plan:  
Ms Catarino Mcbride has a h/o: 
1) CAD (LAD ISELA 2014 for NQMI), Neg PET for ischemia 9/2018. 
2) mixed ischemic/tachycardia mediated CM 4/2014 (EF 20%); EF 45% 11/2017.  Entresto was too expensive. *Low EF 10/9/18 (15-20%): Med Rx for now. 3) HTN, On lower dose meds last months admit due to low BPs after EP procedure: increased @ rehab. 4) PAfib (RFA 4/2016 and 1/2017), Recurrrent/persistent afib 2017/2018 *Chronic Afib now; BiV device in 10/18/18; AV node abation 10/19/18:  Off amiodarone; on Xarelto. 5) DM,  
6) SHAYLA (on CPAP),  
7) CKD (Cr 1.5 range).  Aldactone stopped in 2014.  
  
Rec: non-cardiac CP; Med Rx; Increase lisinopril to 10 qday; cont toprol XL 50 (increase as needed; prev on lisinopril 20/toprol  every day and norvasc as below). Restart pradaxa (instead of current hep gtt) when able. 
  
ID/GI, SHAYLA, DM, Dispo: per primary team.  
For other plans, see orders. 11/13: Improved HTN; No anginal CP (less reproducible chest wall pain); no MI. Not vol overloaded; stable chronic afib; Remains on hep gtt rather than Pradaxa. HIDA scan pending; If needs surgery, moderate-to-high risk due to CM; No obviously modifiable risk factors are noted (other than better BP control). If she's having a \"low bleeding-risk procedure,\" pradaxa should be held for 2 days (GFR 3-50); If the procedure is considered \"high bleeding-risk,\" the pradaxa should be held for 4 days. _________________________________________________________________________________________ As previously noted: \"On d/c last month: \"To be off amio; D/C cardiac meds: lisinopril 2.5 qday; toprol XL 25 qday; pradaxa 150 bid; bumex 1 qday; KCL 10. Xarelto qday. ICD check 2 weeks; OV with me in 1 month. OK for rehab from cardiac standpoint. \" 
  
In rehab, she reports lisinopril increased to 5 qday and toprol XL to 50. 
____________________________________________________________________ 
  
The patient reports increased BPs (>200s/120), worse dyspnea; reproducible chest wall pain/abd pain/ 
No PND, orthopnea, palpitations, pre-syncope, syncope, new peripheral edema. Current c/o reproducible chest wall/abd pain. 
  
ECG: afib/Vpaced. Tele: afib/Vpaced. CXR: \"Small right pleural effusion suspected. No other evidence of acute finding. \" 
  
Notable labs: trops 0.08/0.08/0.06; Cr 1.45; k 2.8; Nl mag; proBNP 9k. Nl CBC. \" 
____________________________________________________________________________________________ Notable prior cardiac history:  @ OV 10/2/18: 
Ms Guy Tena has a h/o: 
1) CAD (LAD ISELA 2014 for NQMI), Neg PET for ischemia 9/2018. 
2) mixed ischemic/tachycardia mediated CM 4/2014 (EF 20%); EF 45% 11/2017.  Entresto was too expensive. 3) HTN, 4) PAfib (RFA 4/2016 and 1/2017), Recurrrent/persistent afib 2017/2018 5) DM,  
6) SHAYLA (on CPAP),  
7) CKD (Cr 1.5 range).  Aldactone stopped in 2014. 8) [facility] PPM 7/2014 for bradycardia while on therapy for AFib. 
  
11/2017: No CP/STEEN; Back in afib today:  Coreg changed Toprol-XL.   
10/2018:  Recent ER visit with epigastric pain; negative evaluation there & seen by Dr. Ambrosio Sequeira who set up a cardiac PET. The PET shows no ischemia but EF suggested at 16%.  Of note patient does have AFib with accelerated ventricular response.  No recurrence of epigastric discomfort.  No bleeding.  Had a simple trip at home a few weeks ago without injury. 
  
IMPRESSION AND PLAN 
   
01. (I25.10) Atherosclerotic heart disease of native coronary artery without angina pectoris:  No anginal symptoms.  Cont asa and stopped plavix with need for anticoagulation. 
  
We discussed the signs and symptoms of unstable angina, myocardial infarction and malignant arrhythmia.  The patient knows to seek immediate medical attention should they occur.  ECG done today 02. (I42.0) Dilated cardiomyopathy:  Mixed ischemic/non-ischemic (tachycardia mediated) CM.  Her ejection fraction is greater than 35% after repeat echo.  The patient's systolic function has been stable. I suspect the PET has a falsely low EF related to her AFib but repeat echo is needed.  2-D w/CFD Echo to be done first available. 03. (I50.42) Chronic combined systolic (congestive) and diastolic (congestive) heart failure:  Resolved exertional symptoms.  (NYHA I)  The patient is compliant with their CHF regimen. 04. (I48.1) Persistent atrial fibrillation:  Management per Dr Joann Hernandez; currently off amio and on Pradaxa.  Heart rate has been to elevated; increase Toprol  q.day.  Further AFib monitoring/therapy per Dr. Jud Harden. 05. (I13.0) Hyp hrt & chr kdny dis w hrt fail and stg 1-4/unsp chr kdny:  Adequately controlled.  We will see how the patient does with the med changes noted above. 06. (E78.2) Mixed hyperlipidemia:  Lipid labs drawn by PCP. The patient is tolerating lipid lowering therapy well. 07. (N18.3) Chronic kidney disease, stage 3 (moderate):  Cr 1.24/GFR46 11/2015.  Cr 1.32/GFR 43 1/2017.  
08. (R60.0) Localized edema: Tristan Pierini avoids salt. 09. (E11.69) Type 2 diabetes mellitus with other specified complication:  Lipids & HTN as noted above; DM managed by other MD.  
10. (Z95.0) Presence of cardiac pacemaker:  will continue to be followed in device clinic. 11. (Z79.82) Long term (current) use of aspirin:  This condition is stable. 12. (Z79.01) Long term (current) use of anticoagulants:  This condition is stable.  Indicated for atrial fibrillation.  No bleeding. If she has recurrent falls, she knows to call for re-evaluation of anticoagulation.   
13. (Z68.38) Body mass index (BMI) 38.0-38.9, adult:  The patient was instructed on AHA diet and regular exercise.  
  
ORDERS: 
       
1 ECG done today    
2 2-D w/ CFD Echocardiogram first available.    
3 Return office visit with Dae Little MD in 6 Months.    
4 The patient was instructed on AHA diet and regular exercise.    
  
  
10/2/18 MEDICATION LIST Medication Sig Description  
amlodipine 5 mg tablet take 1 tablet by oral route  every day per pc  
aspirin 81 mg tablet,delayed release take 1 tablet by oral route  every day  
atorvastatin 40 mg tablet take 1 tablet by oral route  every evening  
bumetanide 1 mg tablet 1 in am on tues&thursday 2 tablets on mon wed fridays Calcium 600 600 mg (1,500 mg) tablet daily  
clonazepam 0.5 mg tablet take 1 tablet by oral route  every day LISINOPRIL 20 MG Tablet TAKE 1 TABLET EVERY DAY Lyrica 200 mg capsule take 1 capsule by oral route 2 times every day  
magnesium 250 mg tablet take 1 tablet by oral route  every day  
metoprolol succinate  mg tablet,extended release 24 hr take 1 tablet by oral route  every day  
potassium gluconate 500 mg (83 mg) tablet daily Pradaxa 150 mg capsule take 1 capsule by oral route 2 times every day Premarin 0.625 mg/gram vaginal cream insert 0.5 applicatorful by vaginal route  every day cyclically, 3 weeks on and 1 week off  
venlafaxine 75 mg tablet 2 po bid Vitamin D3 1,000 unit capsule take 1 daily  
  
____________________________________________________________________________________________________ CARDIAC HISTORY 
CAD: 
     
1 NSTEMI [95% Proximal LAD, Resolute (ISELA) to the Proximal LAD.] - 4/8/2014  
  
CHF/CM: 
     
1 Mixed ischemic/tachycardic CM. [Nl TSH.] - 4/2014  
2 Ischemic & tachycardic Cardiomyopathy [Echo (EF 0.45) - 06/17/2014, (prev EF 20-30%)] - 6/2014  
  
ARRHYTHMIA: 
     
1 A Fib [DCCV, Plan: amio started; Eliquis with Effient (change eliquis to asa if NSR after 30 days). ] - 4/8/2014  
2 PAF - 8/2010 3 A Fib, recurrent; and 6/2014. [Had marked sinus bradycardia while on bblocker/dig.] - 5/2014  
4 Sinus Bradycardia. - 6/2/2014  
5 PPM (Devices (Dual Chamber PPM (Yana Bustle)) - 7/17/2014) - 7/17/2014  
6 PAF [CVR; Eliquis restarted (plavix stopped). ] - 9/2015  
7 A Fib [RFA] - 4/2016  
8 A Fib [RFA] - 1/2017  
  
RISK FACTORS: 
     
1 Diabetes [Diagnosed in 2014] 2 Hypertension 3 Dyslipidemia  
  
  
CARDIOVASCULAR PROCEDURES Procedure Date Results Cardiac PET 09/24/2018 EF 0.16 (16%), physiologic apical thinning with no ischemia Cath 04/08/2014 95% Proximal LAD, Resolute (ISELA) to the Proximal LAD. DCCV 04/08/2014 Initial Rhythm A Fib, Final Rhythm Sinus Devices 07/17/2014 Dual Chamber PPM (Yana Bustle) Echo 11/28/2017 EF 0.45 (45%), Mild Global Hypo, Mild TR, Mild LVH, Mild MR, Est RVSP 41 mmHg, Normal RV. (probable trileaflet AoV). Echo 10/30/2015 EF 0.45 (45%), Mild LVH, LA Diam 4.1 cm, Est RVSP 35 mmHg, Normal RV. ?bicuspid AoV; (Prob trileaflet on previous ANGELITO). Echo 06/17/2014 EF 0.45 (45%), EF range 45%-50%, Mild LV dilatation. Mild LV systolic dysfunction. ? Bicuspid AoV but prob trileafet on previous ANGELITO. Echo 04/03/2014 EF 0.20 (20%), global HK with lateral sparing; no valve disease. EKG 10/02/2018 Atrial Fib, ; nonspecific T-wave flattening. Holter 06/09/2014 No Malignant Arrhythmias, Atrial Fib, No correlation with symptoms, (, [address] Holter 04/30/2014 No Malignant Arrhythmias, Atrial Fib, No correlation with symptoms, \"light or heavy chest\" = afib in 60s. HR  (ave 63). ASx pauses (upto 2.8) while asleep. RFA 01/19/2017 Indication A Fib, Final Rhythm Sinus RFA   Indication A Fib Sleep Study   OSAS: Moderate ANGELITO 04/08/2014 EF 0.35 (35%), No BRAYDON Clot, prob trileaflet AoV. For other plans, see orders. Hospital problem list  
Active Hospital Problems Diagnosis Date Noted  Ischemic cardiomyopathy 11/13/2018  CAD (coronary artery disease) 2018  Epigastric abdominal pain 2018  S/P placement of cardiac pacemaker 2018  Stage 3 chronic kidney disease ([email or web address] 2018  Acute on chronic systolic CHF (congestive heart failure) ([email or web address] 2018  Hypokalemia 10/07/2018  Chronic atrial fibrillation ([email or web address] 2016  
 HBP (high blood pressure) 2013 Subjective: Sarina Lovell reports Chest pain X none  consistent with:  Non-cardiac CP Atypical CP None now  On going  Anginal CP Dyspnea X none  at rest  with exertion    
    improved  unchanged  worse PND X none  overnight Orthopnea X none  improved  unchanged  worse Presyncope X none  improved  unchanged  worse Ambulated in hallway without symptoms   Yes Ambulated in room without symptoms  Yes Objective:  
 Physical Exam: 
Overall VSSAF;   
Visit Vitals BP (!) 166/99 Pulse 80 Temp 97.4 °F (36.3 °C) Resp 16 Ht 5' 9\" (1.753 m) Wt 105.6 kg (232 lb 11.2 oz) SpO2 96% BMI 34.36 kg/m² Temp (24hrs), Av.5 °F (36.4 °C), Min:96.7 °F (35.9 °C), Max:98 °F (36.7 °C) Patient Vitals for the past 8 hrs: 
 Pulse 18 0811 80  
18 0256 78 Patient Vitals for the past 8 hrs: 
 Resp  
18 025 16 Patient Vitals for the past 8 hrs: 
 BP  
18 0811 (!) 166/99  
18 0256 150/89 Intake/Output Summary (Last 24 hours) at 2018 3317 Last data filed at 2018 4594 Gross per 24 hour Intake 853.17 ml Output 1850 ml Net -996.83 ml General Appearance: Well developed, well nourished, no acute distress. Ears/Nose/Mouth/Throat:   Normal MM; anicteric. JVP: WNL Resp:   clear to auscultation bilaterally. Nl resp effort. Cardiovascular:  RRR, S1, S2 normal, no new murmur. No gallop or rub. Abdomen:   Soft, non-tender, bowel sounds are present. Extremities: No edema bilaterally. Skin: 
Neuro: Warm and dry. A/O x3, grossly nonfocal  
cath site intact w/o hematoma or new bruit; distal pulse unchanged  Yes Data Review:    
Telemetry independently reviewed  sinus  chronic afib  parox afib  NSVT  
 
ECG independently reviewed  NSR  afib  no significant changes  NSST-Tw chgs  
 no new ECG provided for review Lab results reviewed as noted below. Current medications reviewed as noted below. No results for input(s): PH, PCO2, PO2 in the last 72 hours. Recent Labs 11/13/18 
0107 11/12/18 
1458 11/12/18 
0737 11/12/18 2008 CPK  --   --   --  50  
TROIQ <0.05 0.06* 0.08* 0.08* Recent Labs 11/13/18 0107 11/12/18 
1649  146*  
K 3.8 2.8*  
 109* CO2 31 29 BUN 16 21* CREA 1.40* 1.45* GFRAA 46* 44* * 122* CA 8.0* 8.3* ALB 3.0* 3.2* WBC 6.1 6.3 HGB 11.0* 11.6 HCT 36.2 37.5  205 Lab Results Component Value Date/Time Cholesterol, total 104 08/01/2018 10:17 AM  
 HDL Cholesterol 33 (L) 08/01/2018 10:17 AM  
 LDL, calculated 49 08/01/2018 10:17 AM  
 Triglyceride 110 08/01/2018 10:17 AM  
 CHOL/HDL Ratio 4.7 04/03/2014 03:30 PM  
 
Recent Labs 11/13/18 
0107 11/12/18 
5299 SGOT 16 24 * 161* TP 6.3* 6.6 ALB 3.0* 3.2*  
GLOB 3.3 3.4 LPSE  --  152 Recent Labs 11/13/18 
0107 11/12/18 
1909 11/12/18 
3297 INR  --   --  1.4* PTP  --   --  14.4* APTT 67.7* 61.3* 50.7* No components found for: [place] Current Facility-Administered Medications Medication Dose Route Frequency  sodium chloride (NS) flush 5-10 mL  5-10 mL IntraVENous Q8H  
 sodium chloride (NS) flush 5-10 mL  5-10 mL IntraVENous PRN  
 acetaminophen (TYLENOL) tablet 650 mg  650 mg Oral Q6H PRN  
 fentaNYL citrate (PF) injection 50 mcg  50 mcg IntraVENous Q6H PRN  
 levoFLOXacin (LEVAQUIN) 750 mg in D5W IVPB  750 mg IntraVENous Q48H  
 metroNIDAZOLE (FLAGYL) IVPB premix 500 mg  500 mg IntraVENous Q8H  
  oxyCODONE IR (ROXICODONE) tablet 5 mg  5 mg Oral Q6H PRN  
 ondansetron (ZOFRAN) injection 4 mg  4 mg IntraVENous Q6H PRN  
 docusate sodium (COLACE) capsule 100 mg  100 mg Oral BID  atorvastatin (LIPITOR) tablet 40 mg  40 mg Oral QHS  clonazePAM (KlonoPIN) tablet 0.5 mg  0.5 mg Oral QHS  potassium chloride SR (KLOR-CON 10) tablet 20 mEq  20 mEq Oral DAILY  pregabalin (LYRICA) capsule 200 mg  200 mg Oral BID  venlafaxine-SR (EFFEXOR-XR) capsule 150 mg  150 mg Oral BID WITH MEALS  metoprolol succinate (TOPROL-XL) XL tablet 100 mg  100 mg Oral DAILY  bumetanide (BUMEX) injection 1 mg  1 mg IntraVENous BID  heparin 25,000 units in D5W 250 ml infusion  9.1-25 Units/kg/hr IntraVENous TITRATE  pantoprazole (PROTONIX) tablet 40 mg  40 mg Oral ACB  heparin (porcine) injection 4,000 Units  4,000 Units IntraVENous PRN  
 heparin (porcine) injection 2,000 Units  2,000 Units IntraVENous PRN  
 insulin lispro (HUMALOG) injection   SubCUTAneous AC&HS  
 glucose chewable tablet 16 g  4 Tab Oral PRN  
 dextrose (D50W) injection syrg 12.5-25 g  12.5-25 g IntraVENous PRN  
 glucagon (GLUCAGEN) injection 1 mg  1 mg IntraMUSCular PRN  
 labetalol (NORMODYNE;TRANDATE) injection 10 mg  10 mg IntraVENous Q2H PRN  
 lisinopril (PRINIVIL, ZESTRIL) tablet 10 mg  10 mg Oral DAILY  nitroglycerin (NITROBID) 2 % ointment 1 Inch  1 Inch Topical Q6H PRN  
 hydrALAZINE (APRESOLINE) 20 mg/mL injection 10 mg  10 mg IntraVENous Q4H PRN Kenyetta Keene MD

## 2018-11-13 NOTE — PROGRESS NOTES
Occupational Therapy EVALUATION/discharge Patient: Tari Lind (39 y.o. female) Date: 11/13/2018 Primary Diagnosis: Acute on chronic systolic CHF (congestive heart failure) ([facility] Hypokalemia Procedure(s) (LRB): OPEN VENTRAL HERNIA REPAIR (N/A) Precautions:   Fall ASSESSMENT:  
Based on the objective data described below, the patient presents at her independent to mod I functional baseline. She is demonstrating good overall balance, activity tolerance and safety awareness during the performance of functional activity. .At this time further skilled acute occupational therapy is not indicated. Discharge Recommendations: None Further Equipment Recommendations for Discharge: none OBJECTIVE DATA SUMMARY:  
HISTORY:  
Past Medical History:  
Diagnosis Date  Anxiety 1/22/2018  Arthritis OA  Asthma  Diabetes ([facility]  Essential hypertension  GERD (gastroesophageal reflux disease)  Hypercholesterolemia 1/22/2018  Hypertension  Long-term use of high-risk medication 1/22/2018  Neuropathy  Other ill-defined conditions(799.89) IBS, spinal stenosis  Plantar fasciitis  Psychiatric disorder   
 depression, anxiety  Sleep apnea   
 uses CPAP  Type 2 diabetes mellitus with diabetic neuropathy, without long-term current use of insulin ([facility] [address] 6/5/2016 Past Surgical History:  
Procedure Laterality Date  CARDIAC SURG PROCEDURE UNLIST    
 stent  HX APPENDECTOMY  HX HYSTERECTOMY  HX PACEMAKER Prior Level of Function/Environment/Context: Patient is independent to mod I for ADLs, some IADLs and ambulation. Patient sits on shower seat for showers, uses RTS and safety frame for toilet transfers and ambulates with a cane when outside the home.   
Occupations in which the patient is/was successful, what are the barriers preventing that success:  
Performance Patterns (routines, roles, habits, and rituals):  
 Personal Interests and/or values:  
Expanded or extensive additional review of patient history:  
 
Home Situation Home Environment: Private residence # Steps to Enter: 2 Rails to Enter: Yes One/Two Story Residence: One story Living Alone: No 
Support Systems: Family member(s) Patient Expects to be Discharged to[de-identified] Private residence Current DME Used/Available at Home: [place], straight, Commode, bedside, Shower chair, Walker, rollator, Walker, rolling Tub or Shower Type: Tub/Shower combination Hand dominance: Right EXAMINATION OF PERFORMANCE DEFICITS: 
Cognitive/Behavioral Status: 
Neurologic State: Alert Orientation Level: Oriented X4 Cognition: Appropriate decision making; Appropriate for age attention/concentration; Appropriate safety awareness; Follows commands Safety/Judgement: Awareness of environment; Insight into deficits Hearing: Auditory Auditory Impairment: None Vision/Perceptual:   
Acuity: Within Defined Limits Range of Motion: 
AROM: Within functional limits Strength: 
Strength: Within functional limits Coordination: 
Coordination: Within functional limits Fine Motor Skills-Upper: (Neuropathy in hands and feet) Tone & Sensation: 
Tone: Normal 
Sensation: Impaired Balance: 
Sitting: Intact Standing: Intact Functional Mobility and Transfers for ADLs:Bed Mobility: 
Rolling: Independent Supine to Sit: Independent Scooting: Independent Transfers: 
Sit to Stand: Independent Stand to Sit: Independent Bed to Chair: Modified independent(ambulating with a Cane) Bathroom Mobility: Modified independent(ambulating with a Cane) Toilet Transfer : Modified independent ADL Assessment: 
Feeding: Independent Oral Facial Hygiene/Grooming: Independent Bathing: Modified independent Upper Body Dressing: Independent Lower Body Dressing: Independent Toileting: Independent Functional Measure: 
Barthel Index: Bathin Bladder: 10 Bowels: 10 
Groomin Dressing: 10 Feeding: 10 Mobility: 10 Stairs: 5 Toilet Use: 10 Transfer (Bed to Chair and Back): 15 Total: 90 Barthel and G-code impairment scale: 
Percentage of impairment CH 
0% CI 
1-19% CJ 
20-39% CK 
40-59% CL 
60-79% CM 
80-99% CN 
100% Barthel Score 0-100 100 99-80 79-60 59-40 20-39 1-19 
 0 Barthel Score 0-20 20 17-19 13-16 9-12 5-8 1-4 0 The Barthel ADL Index: Guidelines 1. The index should be used as a record of what a patient does, not as a record of what a patient could do. 2. The main aim is to establish degree of independence from any help, physical or verbal, however minor and for whatever reason. 3. The need for supervision renders the patient not independent. 4. A patient's performance should be established using the best available evidence. Asking the patient, friends/relatives and nurses are the usual sources, but direct observation and common sense are also important. However direct testing is not needed. 5. Usually the patient's performance over the preceding 24-48 hours is important, but occasionally longer periods will be relevant. 6. Middle categories imply that the patient supplies over 50 per cent of the effort. 7. Use of aids to be independent is allowed. Singh Loza, Barthel, D.W. (0882). Functional evaluation: the Barthel Index. [facility] (14)2. ENRIQUE Yu, Agata Novoa., Charles Batista., [place], [address] [place] [address] (). Measuring the change indisability after inpatient rehabilitation; comparison of the responsiveness of the Barthel Index and Functional Esmeralda Measure. Journal of Neurology, Neurosurgery, and Psychiatry, 66(4), 481-531. Miriam Snider, N.J.A, PAWAN Izaguirre, & Heide Urrutia MMarleneA. (2004.) Assessment of post-stroke quality of life in cost-effectiveness studies: The usefulness of the Barthel Index and the EuroQoL-5D. [facility], 13, 387-79 G codes: In compliance with CMSs Claims Based Outcome Reporting, the following G-code set was chosen for this patient based on their primary functional limitation being treated: The outcome measure chosen to determine the severity of the functional limitation was the Barthel index with a score of 90/100 which was correlated with the impairment scale. ? Self Care:  
  - CURRENT STATUS: CI - 1%-19% impaired, limited or restricted  - GOAL STATUS: CI - 1%-19% impaired, limited or restricted  - D/C STATUS:  CI - 1%-19% impaired, limited or restricted Pain: 
Pain Scale 1: Numeric (0 - 10) Pain Intensity 1: 0 Pain Location 1: Head 
  
Pain Description 1: Aching Activity Tolerance: VSS After treatment:  
[x]  Patient left in no apparent distress sitting up in chair 
[]  Patient left in no apparent distress in bed 
[x]  Call bell left within reach [x]  Nursing notified 
[]  Caregiver present [x]  Bed alarm activated COMMUNICATION/EDUCATION:  
Communication/Collaboration: 
[x]      Home safety education was provided and the patient/caregiver indicated understanding. [x]      Patient/family have participated as able and agree with findings and recommendations. []      Patient is unable to participate in plan of care at this time. Findings and recommendations were discussed with: Physical Therapist and Registered Nurse Carlos Rodriguez, OTR/L Time Calculation: 20 mins

## 2018-11-13 NOTE — PROGRESS NOTES
Physical Therapy Patient currently off the floor. Will continue to follow for mobility progression/evaluation.  
Leo Walls PT, DPT

## 2018-11-13 NOTE — PROGRESS NOTES
Bedside shift change report given to Michael Mack, DIAN (oncoming nurse). Report included the following information SBAR, Kardex, Intake/Output, MAR and Recent Results. SHIFT SUMMARY: 
 
 
 
 
 
[address] NURSING NOTE Admission Date 11/12/2018 Admission Diagnosis Acute on chronic systolic CHF (congestive heart failure) ([facility] [address] Hypokalemia Consults IP CONSULT TO GENERAL SURGERY 
IP CONSULT TO PALLIATIVE CARE - PROVIDER 
IP CONSULT TO CARDIOLOGY Cardiac Monitoring [x] Yes [] No  
  
Purposeful Hourly Rounding [x] Yes   
Ananya Score Total Score: 4 Ananya score 3 or > [] Bed Alarm [] Avasys [] 1:1 sitter [] Patient refused (Signed refusal form in chart) Preet Score Preet Score: 22 Preet score 14 or < [] PMT consult [] Wound Care consult  
 []  Specialty bed  [] Nutrition consult Influenza Vaccine Received Flu Vaccine for Current Season (usually Sept-March): Yes Oxygen needs? [x] Room air Oxygen @  []1L    []2L    []3L   []4L    []5L   []6L via NC Chronic home O2 use? [] Yes [x] No 
Perform O2 challenge test and document in progress note using smartphrase (.Homeoxygen) Last bowel movement Urinary Catheter LDAs Peripheral IV 11/12/18 Left Hand (Active) Site Assessment Clean, dry, & intact 11/12/2018  7:43 PM  
Phlebitis Assessment 0 11/12/2018  7:43 PM  
Infiltration Assessment 0 11/12/2018  7:43 PM  
Dressing Status Clean, dry, & intact 11/12/2018  7:43 PM  
Dressing Type Transparent 11/12/2018  7:43 PM  
Hub Color/Line Status Blue;Capped 11/12/2018  7:43 PM  
   
Peripheral IV 11/12/18 Right Antecubital (Active) Site Assessment Clean, dry, & intact 11/12/2018  7:43 PM  
Phlebitis Assessment 0 11/12/2018  7:43 PM  
Infiltration Assessment 0 11/12/2018  7:43 PM  
Dressing Status Clean, dry, & intact 11/12/2018  7:43 PM  
Dressing Type Transparent 11/12/2018  7:43 PM  
Hub Color/Line Status Capped 11/12/2018  7:43 PM  
                  
  
 Readmission Risk Assessment Tool Score High Risk   
      
 26 Total Score 3 Has Seen PCP in Last 6 Months (Yes=3, No=0)  
 4 IP Visits Last 12 Months (1-3=4, 4=9, >4=11) 5 Pt. Coverage (Medicare=5 , Medicaid, or Self-Pay=4) 14 Charlson Comorbidity Score (Age + Comorbid Conditions) Criteria that do not apply:  
 . Living with Significant Other. Assisted Living. LTAC. SNF. or  
Rehab Patient Length of Stay (>5 days = 3) Expected Length of Stay - - - Actual Length of Stay 0

## 2018-11-13 NOTE — PROGRESS NOTES
Juan Pablo Davis, ERWIN notified of patient's readmission to [facility]/ Janny Castro of Care Management

## 2018-11-13 NOTE — PROGRESS NOTES
Spiritual Care Assessment/Progress Note [address] 
 
 
NAME: Lauri Guerrero      MRN: [id number] AGE: 79 y.o. SEX: female Muslim Affiliation: Buddhism  
Language: English  
 
11/13/2018     Total Time (in minutes): 10 Spiritual Assessment begun in [facility] CARDIOPULMONARY CARE through conversation with: 
  
    [x]Patient        [] Family    [] Friend(s) Spiritual beliefs: (Please include comment if needed) 
   [] Identifies with a kamron tradition:     
   [] Supported by a kamron community:        
   [] Claims no spiritual orientation:       
   [] Seeking spiritual identity:            
   [] Adheres to an individual form of spirituality:       
   [x] Not able to assess:                   
 
    
Identified resources for coping:  
   [] Prayer                           
   [] Music                  [] Guided Imagery 
   [] Family/friends                 [] Pet visits [] Devotional reading                         [x] Unknown 
   [] Other:                                          
 
 
Interventions offered during this visit: (See comments for more details) Plan of Care: 
 
 [] Support spiritual and/or cultural needs  
 [] Support AMD and/or advance care planning process    
 [] Support grieving process 
 [] Coordinate Rites and/or Rituals  
 [] Coordination with community clergy 
 [x] No spiritual needs identified at this time 
 [] Detailed Plan of Care below (See Comments)  [] Make referral to Music Therapy 
[] Make referral to Pet Therapy    
[] Make referral to Addiction services 
[] Make referral to [facility] 
[] Make referral to Spiritual Care Partner 
[] No future visits requested       
[x] Follow up visits as needed Comments: Patient was being attended to by medical staff. Patient was able to acknowledge the visit and I was able to introduce myself as  only. [address] EdD, MDiv For [place] [phone])

## 2018-11-13 NOTE — PROGRESS NOTES
Pharmacy Medication Reconciliation The patient was interviewed regarding current PTA medication list, use and drug allergies. The patient was questioned regarding use of any other inhalers, topical products, over the counter medications, herbal medications, vitamin products or ophthalmic/nasal/otic medication use. Allergy Update: No changes Recommendations/Findings: The following amendments were made to the patient's active medication list on file at [facility]:  
1) Additions: - Tylenol - Aspercreme 
    - Saline nasal spray - Premarin 0.625 mg cream 
 
2) Deletions: None 3) Changes:  
     - Bumex 1 mg: Take 2 tablets daily - Lisinopril: Take 5 mg daily - Metoprolol succinate: Take 50 mg daily 4) Additional Information: 
     - Patient is currently enrolled in patient assistance programs for the dabigatran, pregabalin, and the premarin. Patient was confident with the strengths of the premarin cream and the pregabalin but was uncertain about the dabigatran so the strength was clarified with [place] patient assistance program.  
 
 
-Clarified PTA med list with Rx Query, patient, BI Patient assistance. PTA medication list was corrected to the following:  
 
Prior to Admission Medications Prescriptions Last Dose Informant Patient Reported? Taking?  
acetaminophen (TYLENOL EXTRA STRENGTH) 500 mg tablet   Yes Yes Sig: Take 1,000 mg by mouth every eight (8) hours as needed for Pain (Headache). aspirin 81 mg chewable tablet 11/11/2018 at Unknown time Self Yes Yes Sig: Take 81 mg by mouth daily. atorvastatin (LIPITOR) 40 mg tablet 11/11/2018 at Unknown time Self No Yes Sig: Take 1 Tab by mouth nightly. bumetanide (BUMEX) 1 mg tablet   Yes Yes Sig: Take 2 mg by mouth daily. cholecalciferol, vitamin d3, (VITAMIN D3) 400 unit cap 11/11/2018 at Unknown time  Yes Yes Sig: Take 400 Units by mouth daily. clonazePAM (KLONOPIN) 0.5 mg tablet 2018 at Unknown time  No Yes Sig: TAKE 1 TABLET EVERY NIGHT. MAX DAILY DOSE OF 0.5MG. conjugated estrogens (PREMARIN) 0.625 mg/gram vaginal cream   Yes Yes Sig: Insert 0.5 g into vagina two (2) days a week.  and   
dabigatran etexilate (PRADAXA) 150 mg capsule 2018 at Unknown time  Yes Yes Sig: Take 150 mg by mouth two (2) times a day. lidocaine (ASPERCREME, LIDOCAINE,) 4 % topical cream   Yes Yes Sig: Apply  to affected area two (2) times daily as needed for Pain (Knee pain). lisinopril (PRINIVIL, ZESTRIL) 5 mg tablet   Yes Yes Sig: Take 5 mg by mouth daily. metoprolol succinate (TOPROL-XL) 50 mg XL tablet   Yes Yes Sig: Take 50 mg by mouth daily. potassium chloride SR (KLOR-CON 10) 10 mEq tablet 2018 at Unknown time  No Yes Sig: Take 1 Tab by mouth daily. pregabalin (LYRICA) 200 mg capsule 2018 at Unknown time  Yes Yes Sig: Take 200 mg by mouth two (2) times a day. sodium chloride (AYR SALINE) 0.65 % nasal squeeze bottle   Yes Yes Si Zapata by Both Nostrils route two (2) times a day. venlafaxine-SR (EFFEXOR XR) 150 mg capsule 2018 at Unknown time  Yes Yes Sig: Take 150 mg by mouth two (2) times daily (with meals). Facility-Administered Medications: None Thank you, Timothy Salgado, PHARMD

## 2018-11-13 NOTE — PROGRESS NOTES
0700: Received bedside report from Shabana Walden, off going nurse. Assumed care of patient. 1900: Bedside shift change report given to Shabana Walden, RN (oncoming nurse). Report included the following information SBAR, Kardex, Intake/Output, MAR, Recent Results and Cardiac Rhythm Paced. SHIFT SUMMARY: 
 
 
 
 
 
[address] NURSING NOTE Admission Date 11/12/2018 Admission Diagnosis Acute on chronic systolic CHF (congestive heart failure) ([facility] [address] Hypokalemia Consults IP CONSULT TO GENERAL SURGERY 
IP CONSULT TO PALLIATIVE CARE - PROVIDER 
IP CONSULT TO CARDIOLOGY Cardiac Monitoring [x] Yes [] No  
  
Purposeful Hourly Rounding [x] Yes   
Ananya Score Total Score: 4 Ananya score 3 or > [x] Bed Alarm [] Avasys [] 1:1 sitter [] Patient refused (Signed refusal form in chart) Preet Score Preet Score: 22 Preet score 14 or < [] PMT consult [] Wound Care consult  
 []  Specialty bed  [] Nutrition consult Influenza Vaccine Received Flu Vaccine for Current Season (usually Sept-March): Yes Oxygen needs? [x] Room air Oxygen @  []1L    []2L    []3L   []4L    []5L   []6L via NC Chronic home O2 use? [] Yes [] No 
Perform O2 challenge test and document in progress note using smartphrase (.Homeoxygen) Last bowel movement Urinary Catheter LDAs Peripheral IV 11/12/18 Left Hand (Active) Site Assessment Clean, dry, & intact 11/13/2018  2:42 PM  
Phlebitis Assessment 0 11/13/2018  2:42 PM  
Infiltration Assessment 0 11/13/2018  2:42 PM  
Dressing Status Clean, dry, & intact 11/13/2018  2:42 PM  
Dressing Type Transparent 11/13/2018  2:42 PM  
Hub Color/Line Status Blue;Patent; Flushed 11/13/2018  2:42 PM  
   
Peripheral IV 11/12/18 Right Antecubital (Active) Site Assessment Clean, dry, & intact 11/13/2018  2:42 PM  
Phlebitis Assessment 0 11/13/2018  2:42 PM  
Infiltration Assessment 0 11/13/2018  2:42 PM  
Dressing Status Clean, dry, & intact 11/13/2018  2:42 PM  
 Dressing Type Transparent 11/13/2018  2:42 PM  
Hub Color/Line Status Pink; Infusing;Patent 11/13/2018  2:42 PM  
                  
  
Readmission Risk Assessment Tool Score High Risk   
      
 30 Total Score 3 Has Seen PCP in Last 6 Months (Yes=3, No=0)  
 4 IP Visits Last 12 Months (1-3=4, 4=9, >4=11) 5 Pt. Coverage (Medicare=5 , Medicaid, or Self-Pay=4) 18 Charlson Comorbidity Score (Age + Comorbid Conditions) Criteria that do not apply:  
 . Living with Significant Other. Assisted Living. LTAC. SNF. or  
Rehab Patient Length of Stay (>5 days = 3) Expected Length of Stay 4d 2h Actual Length of Stay 1

## 2018-11-13 NOTE — PROGRESS NOTES
OT referral received, chart reviewed, and attempted to see patient for evaluation. Patient is presently off the floor for a test. Will follow up later today or tomorrow for evaluation.

## 2018-11-13 NOTE — PROGRESS NOTES
Admit Date: 2018 POD * No surgery found * Procedure:  * No surgery found * Subjective:  
 
Patient has complaints of headache. Abdominal and back pain much better, not completely gone. Objective:  
 
Blood pressure (!) 166/99, pulse 80, temperature 97.4 °F (36.3 °C), resp. rate 16, height 5' 9\" (1.753 m), weight 232 lb 11.2 oz (105.6 kg), SpO2 96 %. Temp (24hrs), Av.5 °F (36.4 °C), Min:96.7 °F (35.9 °C), Max:98 °F (36.7 °C) Physical Exam:  GENERAL: alert, cooperative, no distress, appears stated age, LUNG: clear to auscultation bilaterally, HEART: regular rate and rhythm, ABDOMEN: soft, non-tender. Bowel sounds normal. No masses,  no organomegaly, EXTREMITIES:  extremities normal, atraumatic, no cyanosis or edema Labs:  
Recent Results (from the past 24 hour(s)) GLUCOSE, POC Collection Time: 18 12:52 PM  
Result Value Ref Range Glucose (POC) 113 (H) 65 - 100 mg/dL Performed by Armando Whaley   
TROPONIN I Collection Time: 18  2:58 PM  
Result Value Ref Range Troponin-I, Qt. 0.06 (H) <0.05 ng/mL GLUCOSE, POC Collection Time: 18  5:35 PM  
Result Value Ref Range Glucose (POC) 108 (H) 65 - 100 mg/dL Performed by Lulu Ji PTT Collection Time: 18  7:09 PM  
Result Value Ref Range aPTT 61.3 (H) 22.1 - 32.0 sec  
 aPTT, therapeutic range     58.0 - 77.0 SECS  
GLUCOSE, POC Collection Time: 18  8:45 PM  
Result Value Ref Range Glucose (POC) 112 (H) 65 - 100 mg/dL Performed by Eligio Baumann PTT Collection Time: 18  1:07 AM  
Result Value Ref Range aPTT 67.7 (H) 22.1 - 32.0 sec  
 aPTT, therapeutic range     58.0 - 77.0 SECS  
CBC WITH AUTOMATED DIFF Collection Time: 18  1:07 AM  
Result Value Ref Range WBC 6.1 3.6 - 11.0 K/uL  
 RBC 3.81 3.80 - 5.20 M/uL  
 HGB 11.0 (L) 11.5 - 16.0 g/dL HCT 36.2 35.0 - 47.0 %  MCV 95.0 80.0 - 99.0 FL  
 MCH 28.9 26.0 - 34.0 PG  
 MCHC 30.4 30.0 - 36.5 g/dL  
 RDW 18.3 (H) 11.5 - 14.5 % PLATELET 409 610 - 560 K/uL MPV 11.4 8.9 - 12.9 FL  
 NRBC 0.0 0  WBC ABSOLUTE NRBC 0.00 0.00 - 0.01 K/uL NEUTROPHILS 60 32 - 75 % LYMPHOCYTES 28 12 - 49 % MONOCYTES 8 5 - 13 % EOSINOPHILS 3 0 - 7 % BASOPHILS 0 0 - 1 % IMMATURE GRANULOCYTES 1 (H) 0.0 - 0.5 % ABS. NEUTROPHILS 3.6 1.8 - 8.0 K/UL  
 ABS. LYMPHOCYTES 1.7 0.8 - 3.5 K/UL  
 ABS. MONOCYTES 0.5 0.0 - 1.0 K/UL  
 ABS. EOSINOPHILS 0.2 0.0 - 0.4 K/UL  
 ABS. BASOPHILS 0.0 0.0 - 0.1 K/UL  
 ABS. IMM. GRANS. 0.0 0.00 - 0.04 K/UL  
 DF AUTOMATED    
TROPONIN I Collection Time: 11/13/18  1:07 AM  
Result Value Ref Range Troponin-I, Qt. <0.05 <0.05 ng/mL METABOLIC PANEL, COMPREHENSIVE Collection Time: 11/13/18  1:07 AM  
Result Value Ref Range Sodium 145 136 - 145 mmol/L Potassium 3.8 3.5 - 5.1 mmol/L Chloride 107 97 - 108 mmol/L  
 CO2 31 21 - 32 mmol/L Anion gap 7 5 - 15 mmol/L Glucose 105 (H) 65 - 100 mg/dL BUN 16 6 - 20 MG/DL Creatinine 1.40 (H) 0.55 - 1.02 MG/DL  
 BUN/Creatinine ratio 11 (L) 12 - 20 GFR est AA 46 (L) >60 ml/min/1.73m2 GFR est non-AA 38 (L) >60 ml/min/1.73m2 Calcium 8.0 (L) 8.5 - 10.1 MG/DL Bilirubin, total 0.7 0.2 - 1.0 MG/DL  
 ALT (SGPT) 21 12 - 78 U/L  
 AST (SGOT) 16 15 - 37 U/L Alk. phosphatase 142 (H) 45 - 117 U/L Protein, total 6.3 (L) 6.4 - 8.2 g/dL Albumin 3.0 (L) 3.5 - 5.0 g/dL Globulin 3.3 2.0 - 4.0 g/dL A-G Ratio 0.9 (L) 1.1 - 2.2 MAGNESIUM Collection Time: 11/13/18  1:07 AM  
Result Value Ref Range Magnesium 1.7 1.6 - 2.4 mg/dL GLUCOSE, POC Collection Time: 11/13/18  8:04 AM  
Result Value Ref Range Glucose (POC) 124 (H) 65 - 100 mg/dL Performed by Elina Waterman (YVES) Data Review images and reports reviewed Assessment:  
 
Principal Problem: 
  Acute on chronic systolic CHF (congestive heart failure) ([facility] [address] (11/12/2018) Active Problems: HBP (high blood pressure) (2/22/2013) Chronic atrial fibrillation ([facility] [address] (6/5/2016) Hypokalemia (10/7/2018) Ischemic cardiomyopathy (11/13/2018) CAD (coronary artery disease) (11/13/2018) Epigastric abdominal pain (11/13/2018) S/P placement of cardiac pacemaker (11/13/2018) Stage 3 chronic kidney disease ([facility] [address] (11/13/2018) Plan/Recommendations/Medical Decision Making:  
 
Continue present treatment Hida Scan about to be done If she has non-visualization of the gallbladder, it is appropriate to proceed with cholecystectomy once Pradaxa effects are off. Discussed with Dr. Francy Metzger, will agree on timing of surgery once Hida scan results back. Denis Encinas. Mckayla Garcia MD, [address] [place] [address]. [facility]

## 2018-11-13 NOTE — PROGRESS NOTES
Problem: Falls - Risk of 
Goal: *Absence of Falls Document Tonia Whartona Fall Risk and appropriate interventions in the flowsheet. Outcome: Progressing Towards Goal 
Fall Risk Interventions: 
Mobility Interventions: Bed/chair exit alarm, Communicate number of staff needed for ambulation/transfer, Mechanical lift, OT consult for ADLs, Patient to call before getting OOB, PT Consult for mobility concerns Medication Interventions: Bed/chair exit alarm, Evaluate medications/consider consulting pharmacy, Patient to call before getting OOB, Teach patient to arise slowly Elimination Interventions: Bed/chair exit alarm, Call light in reach, Elevated toilet seat, Patient to call for help with toileting needs, Toilet paper/wipes in reach, Toileting schedule/hourly rounds History of Falls Interventions: Bed/chair exit alarm, Consult care management for discharge planning, Door open when patient unattended, Evaluate medications/consider consulting pharmacy, Investigate reason for fall, Room close to nurse's station

## 2018-11-13 NOTE — PROGRESS NOTES
Problem: Falls - Risk of 
Goal: *Absence of Falls Document Caroline Kruger Fall Risk and appropriate interventions in the flowsheet. Outcome: Progressing Towards Goal 
Fall Risk Interventions: 
Mobility Interventions: Bed/chair exit alarm, OT consult for ADLs, PT Consult for mobility concerns, PT Consult for assist device competence, Utilize walker, cane, or other assistive device Medication Interventions: Bed/chair exit alarm, Patient to call before getting OOB, Teach patient to arise slowly Elimination Interventions: Call light in reach, Patient to call for help with toileting needs, Toileting schedule/hourly rounds History of Falls Interventions: Bed/chair exit alarm, Investigate reason for fall, Room close to nurse's station

## 2018-11-13 NOTE — PROGRESS NOTES
Pharmacy Automatic Renal Dosing Protocol - Antimicrobials Indication for Antimicrobials: Intra-abdominal  
 
Current Regimen of Each Antimicrobial: 
Levofloxacin 750 mg IV every 48 hours (Start Date ; Day # 2) Metronidazole 500 mg IV every 8 hours (Start Date ; Day # 2) Previous Antimicrobial Therapy: 
None Significant Cultures:  
None Radiology / Imaging results: (X-ray, CT scan or MRI):  
 US ABD: Cholelithiasis with mild gallbladder wall thickening and tenderness in the region of the gallbladder concerning for acute cholecystitis. Small right pleural effusion. Cortical thinning right kidney compatible with medical renal disease. Paralysis, amputations, malnutrition: None documented Labs: 
Recent Labs 18 
0107 18 
9162 CREA 1.40* 1.45* BUN 16 21* WBC 6.1 6.3 Temp (24hrs), Av.5 °F (36.4 °C), Min:96.7 °F (35.9 °C), Max:98 °F (36.7 °C) Creatinine Clearance (mL/min) or Dialysis: 41 mL/min Impression/Plan: · Will continue current regimen for levofloxacin as appropriate for indication and renal function. · Will change metronidazole to 500 mg every 12 hours for indication per protocol. · Antimicrobial stop date pending Pharmacy will follow daily and adjust medications as appropriate for renal function and/or serum levels. Thank you, Hanny Nunez, PHARMD 
 
Recommended duration of therapy 
http://[facility]/Pharmacy/Clinical%20Companion/Duration%20of%20ABX%20therapy. docx Renal Dosing 
http://[facility]/[place]/[facility]/Pharmacy/Clinical%20Companion/Renal%[email or web address]. pdf

## 2018-11-14 ENCOUNTER — ANESTHESIA (OUTPATIENT)
Dept: SURGERY | Age: 68
DRG: 987 | End: 2018-11-14
Payer: MEDICARE

## 2018-11-14 LAB
ALBUMIN SERPL-MCNC: 3.3 G/DL (ref 3.5–5)
ALBUMIN/GLOB SERPL: 1.1 {RATIO} (ref 1.1–2.2)
ALP SERPL-CCNC: 141 U/L (ref 45–117)
ALT SERPL-CCNC: 20 U/L (ref 12–78)
ANION GAP SERPL CALC-SCNC: 5 MMOL/L (ref 5–15)
APTT PPP: 63.6 SEC (ref 22.1–32)
AST SERPL-CCNC: 16 U/L (ref 15–37)
BILIRUB SERPL-MCNC: 0.7 MG/DL (ref 0.2–1)
BUN SERPL-MCNC: 17 MG/DL (ref 6–20)
BUN/CREAT SERPL: 12 (ref 12–20)
CALCIUM SERPL-MCNC: 8.6 MG/DL (ref 8.5–10.1)
CHLORIDE SERPL-SCNC: 107 MMOL/L (ref 97–108)
CO2 SERPL-SCNC: 32 MMOL/L (ref 21–32)
CREAT SERPL-MCNC: 1.42 MG/DL (ref 0.55–1.02)
ERYTHROCYTE [DISTWIDTH] IN BLOOD BY AUTOMATED COUNT: 18.3 % (ref 11.5–14.5)
GLOBULIN SER CALC-MCNC: 2.9 G/DL (ref 2–4)
GLUCOSE BLD STRIP.AUTO-MCNC: 100 MG/DL (ref 65–100)
GLUCOSE BLD STRIP.AUTO-MCNC: 106 MG/DL (ref 65–100)
GLUCOSE BLD STRIP.AUTO-MCNC: 91 MG/DL (ref 65–100)
GLUCOSE BLD STRIP.AUTO-MCNC: 94 MG/DL (ref 65–100)
GLUCOSE SERPL-MCNC: 94 MG/DL (ref 65–100)
HCT VFR BLD AUTO: 37.6 % (ref 35–47)
HGB BLD-MCNC: 11.4 G/DL (ref 11.5–16)
MCH RBC QN AUTO: 28.8 PG (ref 26–34)
MCHC RBC AUTO-ENTMCNC: 30.3 G/DL (ref 30–36.5)
MCV RBC AUTO: 94.9 FL (ref 80–99)
NRBC # BLD: 0 K/UL (ref 0–0.01)
NRBC BLD-RTO: 0 PER 100 WBC
PLATELET # BLD AUTO: 205 K/UL (ref 150–400)
PMV BLD AUTO: 11.8 FL (ref 8.9–12.9)
POTASSIUM SERPL-SCNC: 3.8 MMOL/L (ref 3.5–5.1)
PROT SERPL-MCNC: 6.2 G/DL (ref 6.4–8.2)
RBC # BLD AUTO: 3.96 M/UL (ref 3.8–5.2)
SERVICE CMNT-IMP: ABNORMAL
SERVICE CMNT-IMP: NORMAL
SODIUM SERPL-SCNC: 144 MMOL/L (ref 136–145)
THERAPEUTIC RANGE,PTTT: ABNORMAL SECS (ref 58–77)
WBC # BLD AUTO: 5.8 K/UL (ref 3.6–11)

## 2018-11-14 PROCEDURE — 74011250636 HC RX REV CODE- 250/636: Performed by: HOSPITALIST

## 2018-11-14 PROCEDURE — 97110 THERAPEUTIC EXERCISES: CPT

## 2018-11-14 PROCEDURE — 99232 SBSQ HOSP IP/OBS MODERATE 35: CPT | Performed by: SURGERY

## 2018-11-14 PROCEDURE — 97116 GAIT TRAINING THERAPY: CPT

## 2018-11-14 PROCEDURE — 36415 COLL VENOUS BLD VENIPUNCTURE: CPT

## 2018-11-14 PROCEDURE — 65660000000 HC RM CCU STEPDOWN

## 2018-11-14 PROCEDURE — 74011250636 HC RX REV CODE- 250/636: Performed by: SURGERY

## 2018-11-14 PROCEDURE — 94760 N-INVAS EAR/PLS OXIMETRY 1: CPT

## 2018-11-14 PROCEDURE — 82962 GLUCOSE BLOOD TEST: CPT

## 2018-11-14 PROCEDURE — 80053 COMPREHEN METABOLIC PANEL: CPT

## 2018-11-14 PROCEDURE — 74011250636 HC RX REV CODE- 250/636: Performed by: INTERNAL MEDICINE

## 2018-11-14 PROCEDURE — 85730 THROMBOPLASTIN TIME PARTIAL: CPT

## 2018-11-14 PROCEDURE — 74011250637 HC RX REV CODE- 250/637: Performed by: HOSPITALIST

## 2018-11-14 PROCEDURE — 74011250637 HC RX REV CODE- 250/637: Performed by: INTERNAL MEDICINE

## 2018-11-14 PROCEDURE — 74011000250 HC RX REV CODE- 250: Performed by: HOSPITALIST

## 2018-11-14 PROCEDURE — 85027 COMPLETE CBC AUTOMATED: CPT

## 2018-11-14 RX ADMIN — Medication 10 ML: at 03:08

## 2018-11-14 RX ADMIN — PREGABALIN 200 MG: 150 CAPSULE ORAL at 18:09

## 2018-11-14 RX ADMIN — VENLAFAXINE HYDROCHLORIDE 150 MG: 150 CAPSULE, EXTENDED RELEASE ORAL at 18:09

## 2018-11-14 RX ADMIN — METRONIDAZOLE 500 MG: 500 INJECTION, SOLUTION INTRAVENOUS at 03:08

## 2018-11-14 RX ADMIN — BUMETANIDE 1 MG: 0.25 INJECTION INTRAMUSCULAR; INTRAVENOUS at 09:27

## 2018-11-14 RX ADMIN — Medication 10 ML: at 14:00

## 2018-11-14 RX ADMIN — DOCUSATE SODIUM 100 MG: 100 CAPSULE, LIQUID FILLED ORAL at 09:26

## 2018-11-14 RX ADMIN — METOPROLOL SUCCINATE 100 MG: 50 TABLET, EXTENDED RELEASE ORAL at 09:34

## 2018-11-14 RX ADMIN — METRONIDAZOLE 500 MG: 500 INJECTION, SOLUTION INTRAVENOUS at 15:21

## 2018-11-14 RX ADMIN — POTASSIUM CHLORIDE 20 MEQ: 750 TABLET, FILM COATED, EXTENDED RELEASE ORAL at 09:26

## 2018-11-14 RX ADMIN — HEPARIN SODIUM 9.1 UNITS/KG/HR: 10000 INJECTION, SOLUTION INTRAVENOUS at 18:07

## 2018-11-14 RX ADMIN — CLONAZEPAM 0.5 MG: 0.5 TABLET ORAL at 20:55

## 2018-11-14 RX ADMIN — PANTOPRAZOLE SODIUM 40 MG: 40 TABLET, DELAYED RELEASE ORAL at 09:27

## 2018-11-14 RX ADMIN — LEVOFLOXACIN 750 MG: 5 INJECTION, SOLUTION INTRAVENOUS at 12:04

## 2018-11-14 RX ADMIN — VENLAFAXINE HYDROCHLORIDE 150 MG: 150 CAPSULE, EXTENDED RELEASE ORAL at 09:27

## 2018-11-14 RX ADMIN — DOCUSATE SODIUM 100 MG: 100 CAPSULE, LIQUID FILLED ORAL at 18:09

## 2018-11-14 RX ADMIN — ATORVASTATIN CALCIUM 40 MG: 40 TABLET, FILM COATED ORAL at 20:55

## 2018-11-14 RX ADMIN — LISINOPRIL 10 MG: 5 TABLET ORAL at 09:27

## 2018-11-14 RX ADMIN — PREGABALIN 200 MG: 150 CAPSULE ORAL at 09:26

## 2018-11-14 RX ADMIN — BUMETANIDE 1 MG: 0.25 INJECTION INTRAMUSCULAR; INTRAVENOUS at 15:21

## 2018-11-14 RX ADMIN — Medication 10 ML: at 20:57

## 2018-11-14 NOTE — CARDIO/PULMONARY
Cardiopulmonary Rehab: 
   
Chart reviewed. Pt on the CHF bundle list. 
Pt is a 79 y.o. female admitted with Acute on chronic combined systolic/diastolic heart failure and acute resp failure. Pt pending laparoscopic cholecystectomy. PMH includes stent, cardiomyopathy, LVEF 15-  20%, HTN, A fib, SSS, CKD 3,  Diabetes,SHAYLA. Non smoker. Pt visited,  at the bedside. This was a follow-up visit to answer questions and reinforce prior teaching re: CHF, S&Ss, medication management, Low NA diet, daily weights and when to call the doctor. Pt admits to eating foods that may be too high in sodium. She has had soups ad grilled cheese lately. Reviewed low sodium in detail. Pt and j carlos verbalized understanding. They are versed in CHF management.

## 2018-11-14 NOTE — PROGRESS NOTES
Problem: Mobility Impaired (Adult and Pediatric) Goal: *Acute Goals and Plan of Care (Insert Text) Physical Therapy Goals Initiated 11/13/2018 1. Patient will move from supine to sit and sit to supine  in bed with modified independence within 7 day(s). 2.  Patient will transfer from bed to chair and chair to bed with modified independence using the least restrictive device within 7 day(s). 3.  Patient will perform sit to stand with modified independence within 7 day(s). 4.  Patient will ambulate with modified independence for 200 feet with the least restrictive device within 7 day(s). 5.  Patient will ascend/descend 2 stairs with 1 handrail(s) with modified independence within 7 day(s). physical Therapy TREATMENT Patient: Cecy Diaz (89 y.o. female) Date: 11/14/2018 Diagnosis: Acute on chronic systolic CHF (congestive heart failure) ([facility] [address] Hypokalemia Procedure(s) (LRB): OPEN VENTRAL HERNIA REPAIR (N/A) Day of Surgery Precautions: Fall Chart, physical therapy assessment, plan of care and goals were reviewed. ASSESSMENT: pt tolerated tx very well, no LOB (is stable with SPC), no SOB, does well with transfers and ther-ex, good motivation, vc's for safety, could use some HHPT for home safety eval 2/2 to 2 recent falls. Progression toward goals: 
[x]    Improving appropriately and progressing toward goals 
[]    Improving slowly and progressing toward goals 
[]    Not making progress toward goals and plan of care will be adjusted PLAN: 
Patient continues to benefit from skilled intervention to address the above impairments. Continue treatment per established plan of care. Discharge Recommendations:  Home Health Further Equipment Recommendations for Discharge: has straight cane OBJECTIVE DATA SUMMARY:  
Critical Behavior: 
Neurologic State: Alert, Appropriate for age Orientation Level: Appropriate for age Cognition: Appropriate decision making Safety/Judgement: Awareness of environment, Insight into deficits Functional Mobility Training: 
Bed Mobility: pt sitting EOB on arrival 
Transfers: 
Sit to Stand: Independent Stand to Sit: Independent Bed to Chair: Modified independent Interventions: Verbal cues Level of Assistance: Modified independent Balance: 
Sitting: Intact; Without support Standing: Intact; With support Ambulation/Gait Training: 
Distance (ft): 200 Feet (ft) Assistive Device: Gait belt;Cane, straight Ambulation - Level of Assistance: Stand-by assistance Gait Abnormalities: Decreased step clearance Right Side Weight Bearing: Full Left Side Weight Bearing: Full Base of Support: Widened Speed/Brielle: Pace decreased (<100 feet/min) Step Length: Left shortened;Right shortened Therapeutic Exercises:  
sitting EXERCISE Sets Reps Active Active Assist  
Passive Comments Ankle pumps 1 10 [x] [] [] bilat Heel raises 1 10 [x] [] [] \" Toe tap 1 10 [x] [] [] \" Knee ext 1 10 [x] [] [] \" Hip flex 1 10 [x] [] [] \"  
 
Pain: 
Pain Scale 1: Numeric (0 - 10) Pain Intensity 1: 0 Activity Tolerance: good After treatment:  
[x]    Patient left in no apparent distress sitting up in chair 
[]    Patient left in no apparent distress in bed 
[x]    Call bell left within reach [x]    Nursing notified 
[]    Caregiver present 
[]    Bed alarm activated COMMUNICATION/COLLABORATION:  
The patients plan of care was discussed with: Registered Nurse Ayaan Arnold PTA Time Calculation: 25 mins

## 2018-11-14 NOTE — PROGRESS NOTES
Cardiology Progress Note 11/14/2018     Admit Date: 11/12/2018 Admit Diagnosis: Acute on chronic systolic CHF (congestive heart failure) ([facility] [address] Hypokalemia  CC: none currently Cardiac Assessment/Plan:  
Ms Faizan Terry has a h/o: 
1) CAD (LAD ISELA 2014 for NQMI), Neg PET for ischemia 9/2018. 
2) mixed ischemic/tachycardia mediated CM 4/2014 (EF 20%); EF 45% 11/2017.  Entresto was too expensive. *Low EF 10/9/18 (15-20%): Med Rx for now. 3) HTN, On lower dose meds last months admit due to low BPs after EP procedure: increased @ rehab. 4) PAfib (RFA 4/2016 and 1/2017), Recurrrent/persistent afib 2017/2018 *Chronic Afib now; BiV device in 10/18/18; AV node abation 10/19/18:  Off amiodarone; on Xarelto. 5) DM,  
6) SHAYLA (on CPAP),  
7) CKD (Cr 1.5 range).  Aldactone stopped in 2014.  
  
Rec: non-cardiac CP; Med Rx; Increase lisinopril to 10 qday; cont toprol XL 50 (increase as needed; prev on lisinopril 20/toprol  every day and norvasc as below). Restart pradaxa (instead of current hep gtt) when able. 
  
ID/GI, SHAYLA, DM, Dispo: per primary team.  
For other plans, see orders. 11/13: Improved HTN; No anginal CP (less reproducible chest wall pain); no MI. Not vol overloaded; stable chronic afib; Remains on hep gtt rather than Pradaxa. HIDA scan pending; If needs surgery, moderate-to-high risk due to CM; No obviously modifiable risk factors are noted (other than better BP control). If she's having a \"low bleeding-risk procedure,\" pradaxa should be held for 2 days (GFR 3-50); If the procedure is considered \"high bleeding-risk,\" the pradaxa should be held for 4 days. 11/14: + HIDA scan reported; plan for surgery noted; Cardiac risk as above. No new cardiac recs; BP needs better control; add prn NTG paste for jasmina-op; increase ACEi after surgery if needed. Cont bblocker is important (IV if NPO).  
___________________________________________________________________________ ______________ As previously noted: \"On d/c last month: \"To be off amio; D/C cardiac meds: lisinopril 2.5 qday; toprol XL 25 qday; pradaxa 150 bid; bumex 1 qday; KCL 10. Xarelto qday. ICD check 2 weeks; OV with me in 1 month. OK for rehab from cardiac standpoint. \" 
  
In rehab, she reports lisinopril increased to 5 qday and toprol XL to 50. 
____________________________________________________________________ 
  
The patient reports increased BPs (>200s/120), worse dyspnea; reproducible chest wall pain/abd pain/ 
No PND, orthopnea, palpitations, pre-syncope, syncope, new peripheral edema. Current c/o reproducible chest wall/abd pain. 
  
ECG: afib/Vpaced. Tele: afib/Vpaced. CXR: \"Small right pleural effusion suspected. No other evidence of acute finding. \" 
  
Notable labs: trops 0.08/0.08/0.06; Cr 1.45; k 2.8; Nl mag; proBNP 9k. Nl CBC. \" 
____________________________________________________________________________________________ Notable prior cardiac history:  @ OV 10/2/18: 
Ms Norah Anderson has a h/o: 
1) CAD (LAD ISELA 2014 for NQMI), Neg PET for ischemia 9/2018. 
2) mixed ischemic/tachycardia mediated CM 4/2014 (EF 20%); EF 45% 11/2017.  Entresto was too expensive. 3) HTN, 4) PAfib (RFA 4/2016 and 1/2017), Recurrrent/persistent afib 2017/2018 5) DM,  
6) SHAYLA (on CPAP),  
7) CKD (Cr 1.5 range).  Aldactone stopped in 2014. 8) [facility] [place] PPM 7/2014 for bradycardia while on therapy for AFib. 
  
11/2017: No CP/STEEN; Back in afib today:  Coreg changed Toprol-XL.   
10/2018:  Recent ER visit with epigastric pain; negative evaluation there & seen by Dr. Zoey Monterroso who set up a cardiac PET.  The PET shows no ischemia but EF suggested at 16%.  Of note patient does have AFib with accelerated ventricular response.  No recurrence of epigastric discomfort.  No bleeding.  Had a simple trip at home a few weeks ago without injury. 
  
IMPRESSION AND PLAN 
   
01. (I25.10) Atherosclerotic heart disease of native coronary artery without angina pectoris:  No anginal symptoms. Cont asa and stopped plavix with need for anticoagulation. 
  
We discussed the signs and symptoms of unstable angina, myocardial infarction and malignant arrhythmia.  The patient knows to seek immediate medical attention should they occur.  ECG done today 02. (I42.0) Dilated cardiomyopathy:  Mixed ischemic/non-ischemic (tachycardia mediated) CM.  Her ejection fraction is greater than 35% after repeat echo.  The patient's systolic function has been stable. I suspect the PET has a falsely low EF related to her AFib but repeat echo is needed.  2-D w/CFD Echo to be done first available. 03. (I50.42) Chronic combined systolic (congestive) and diastolic (congestive) heart failure:  Resolved exertional symptoms.  (NYHA I)  The patient is compliant with their CHF regimen. 04. (I48.1) Persistent atrial fibrillation:  Management per Dr Panfilo Dasilva; currently off amio and on Pradaxa.  Heart rate has been to elevated; increase Toprol  q.day.  Further AFib monitoring/therapy per Dr. Donnell Goodpasture. 05. (I13.0) Hyp hrt & chr kdny dis w hrt fail and stg 1-4/unsp chr kdny:  Adequately controlled.  We will see how the patient does with the med changes noted above. 06. (E78.2) Mixed hyperlipidemia:  Lipid labs drawn by PCP. The patient is tolerating lipid lowering therapy well. 07. (N18.3) Chronic kidney disease, stage 3 (moderate):  Cr 1.24/GFR46 11/2015.  Cr 1.32/GFR 43 1/2017.  
08. (R60.0) Localized edema: [place] avoids salt. 09. (E11.69) Type 2 diabetes mellitus with other specified complication:  Lipids & HTN as noted above; DM managed by other MD.  
10. (Z95.0) Presence of cardiac pacemaker:  will continue to be followed in device clinic. 11. (Z79.82) Long term (current) use of aspirin:  This condition is stable. 12. (Z79.01) Long term (current) use of anticoagulants:  This condition is stable.  Indicated for atrial fibrillation.  No bleeding. If she has recurrent falls, she knows to call for re-evaluation of anticoagulation. 13. (Z68.38) Body mass index (BMI) 38.0-38.9, adult:  The patient was instructed on AHA diet and regular exercise.  
  
ORDERS: 
       
1 ECG done today    
2 2-D w/ CFD Echocardiogram first available.    
3 Return office visit with Lazarus Banas. Caven MD in 6 Months.    
4 The patient was instructed on AHA diet and regular exercise.    
  
  
10/2/18 MEDICATION LIST Medication Sig Description  
amlodipine 5 mg tablet take 1 tablet by oral route  every day per pc  
aspirin 81 mg tablet,delayed release take 1 tablet by oral route  every day  
atorvastatin 40 mg tablet take 1 tablet by oral route  every evening  
bumetanide 1 mg tablet 1 in am on tues&thursday 2 tablets on mon wed fridays Calcium 600 600 mg (1,500 mg) tablet daily  
clonazepam 0.5 mg tablet take 1 tablet by oral route  every day LISINOPRIL 20 MG Tablet TAKE 1 TABLET EVERY DAY Lyrica 200 mg capsule take 1 capsule by oral route 2 times every day  
magnesium 250 mg tablet take 1 tablet by oral route  every day  
metoprolol succinate  mg tablet,extended release 24 hr take 1 tablet by oral route  every day  
potassium gluconate 500 mg (83 mg) tablet daily Pradaxa 150 mg capsule take 1 capsule by oral route 2 times every day Premarin 0.625 mg/gram vaginal cream insert 0.5 applicatorful by vaginal route  every day cyclically, 3 weeks on and 1 week off  
venlafaxine 75 mg tablet 2 po bid Vitamin D3 1,000 unit capsule take 1 daily  
  
____________________________________________________________________________________________________ CARDIAC HISTORY 
CAD: 
     
1 NSTEMI [95% Proximal LAD, Resolute (ISELA) to the Proximal LAD.] - 4/8/2014  
  
CHF/CM: 
     
1 Mixed ischemic/tachycardic CM.  [Nl TSH.] - 4/2014  
2 Ischemic & tachycardic Cardiomyopathy [Echo (EF 0.45) - 06/17/2014, (prev EF 20-30%)] - 6/2014  
  
ARRHYTHMIA: 
     
 1 A Fib [DCCV, Plan: amio started; Eliquis with Effient (change eliquis to asa if NSR after 30 days). ] - 4/8/2014  
2 PAF - 8/2010  
3 A Fib, recurrent; and 6/2014. [Had marked sinus bradycardia while on bblocker/dig.] - 5/2014  
4 Sinus Bradycardia. - 6/2/2014  
5 PPM (Devices (Dual Chamber PPM (Glory Briana)) - 7/17/2014) - 7/17/2014  
6 PAF [CVR; Eliquis restarted (plavix stopped). ] - 9/2015  
7 A Fib [RFA] - 4/2016  
8 A Fib [RFA] - 1/2017  
  
RISK FACTORS: 
     
1 Diabetes [Diagnosed in 2014] 2 Hypertension 3 Dyslipidemia  
  
  
CARDIOVASCULAR PROCEDURES Procedure Date Results Cardiac PET 09/24/2018 EF 0.16 (16%), physiologic apical thinning with no ischemia Cath 04/08/2014 95% Proximal LAD, Resolute (ISELA) to the Proximal LAD. DCCV 04/08/2014 Initial Rhythm A Fib, Final Rhythm Sinus Devices 07/17/2014 Dual Chamber PPM (Glory Briana) Echo 11/28/2017 EF 0.45 (45%), Mild Global Hypo, Mild TR, Mild LVH, Mild MR, Est RVSP 41 mmHg, Normal RV. (probable trileaflet AoV). Echo 10/30/2015 EF 0.45 (45%), Mild LVH, LA Diam 4.1 cm, Est RVSP 35 mmHg, Normal RV. ?bicuspid AoV; (Prob trileaflet on previous ANGELITO). Echo 06/17/2014 EF 0.45 (45%), EF range 45%-50%, Mild LV dilatation. Mild LV systolic dysfunction. ? Bicuspid AoV but prob trileafet on previous ANGELITO. Echo 04/03/2014 EF 0.20 (20%), global HK with lateral sparing; no valve disease. EKG 10/02/2018 Atrial Fib, ; nonspecific T-wave flattening. Holter 06/09/2014 No Malignant Arrhythmias, Atrial Fib, No correlation with symptoms, (, [address] Holter 04/30/2014 No Malignant Arrhythmias, Atrial Fib, No correlation with symptoms, \"light or heavy chest\" = afib in 60s. HR  (ave 63). ASx pauses (upto 2.8) while asleep. RFA 01/19/2017 Indication A Fib, Final Rhythm Sinus RFA   Indication A Fib Sleep Study   OSAS: Moderate ANGELITO 04/08/2014 EF 0.35 (35%), No BRAYDON Clot, prob trileaflet AoV. For other plans, see orders. Hospital problem list  
Active Hospital Problems Diagnosis Date Noted  Ischemic cardiomyopathy 2018  CAD (coronary artery disease) 2018  Epigastric abdominal pain 2018  S/P placement of cardiac pacemaker 2018  Stage 3 chronic kidney disease ([facility] [address] 2018  Acute on chronic systolic CHF (congestive heart failure) ([facility] [address] 2018  Hypokalemia 10/07/2018  Chronic atrial fibrillation ([facility] 2016  
 HBP (high blood pressure) 2013 Subjective: Grant Sultana reports Chest pain X none  consistent with:  Non-cardiac CP Atypical CP None now  On going  Anginal CP Dyspnea X none  at rest  with exertion    
    improved  unchanged  worse PND X none  overnight Orthopnea X none  improved  unchanged  worse Presyncope X none  improved  unchanged  worse Ambulated in hallway without symptoms   Yes Ambulated in room without symptoms  Yes Objective:  
 Physical Exam: 
Overall VSSAF;   
Visit Vitals BP (!) 167/96 (BP 1 Location: Left arm, BP Patient Position: Sitting) Pulse 80 Temp 97.7 °F (36.5 °C) Resp 20 Ht 5' 9\" (1.753 m) Wt 106.3 kg (234 lb 5.6 oz) SpO2 94% BMI 34.61 kg/m² Temp (24hrs), Av.4 °F (36.3 °C), Min:96.8 °F (36 °C), Max:97.7 °F (36.5 °C) Patient Vitals for the past 8 hrs: 
 Pulse 18 0730 80  
18 0300 78 Patient Vitals for the past 8 hrs: 
 Resp  
18 0730 20  
18 0300 18 Patient Vitals for the past 8 hrs: 
 BP  
18 0730 (!) 167/96  
18 0300 (!) 147/99 Intake/Output Summary (Last 24 hours) at 2018 9061 Last data filed at 2018 6916 Gross per 24 hour Intake 942.17 ml Output 1100 ml Net -157.83 ml General Appearance: Well developed, well nourished, no acute distress. Ears/Nose/Mouth/Throat:   Normal MM; anicteric. JVP: WNL Resp:   clear to auscultation bilaterally. Nl resp effort. Cardiovascular:  RRR, S1, S2 normal, no new murmur. No gallop or rub. Abdomen:   Soft, non-tender, bowel sounds are present. Extremities: Mild edema bilaterally. Skin: 
Neuro: Warm and dry. A/O x3, grossly nonfocal  
cath site intact w/o hematoma or new bruit; distal pulse unchanged  Yes Data Review:    
Telemetry independently reviewed  sinus x chronic afib x V pacing  NSVT  
 
ECG independently reviewed  NSR  afib  no significant changes  NSST-Tw chgs  
 no new ECG provided for review Lab results reviewed as noted below. Current medications reviewed as noted below. No results for input(s): PH, PCO2, PO2 in the last 72 hours. Recent Labs 11/13/18 
0107 11/12/18 
1458 11/12/18 
1630 11/12/18 
5955 CPK  --   --   --  50  
TROIQ <0.05 0.06* 0.08* 0.08* Recent Labs 11/14/18 0111 11/13/18 0107 11/12/18 
9244  145 146*  
K 3.8 3.8 2.8*  
 107 109* CO2 32 31 29 BUN 17 16 21* CREA 1.42* 1.40* 1.45* GFRAA 45* 46* 44* GLU 94 105* 122* CA 8.6 8.0* 8.3* ALB 3.3* 3.0* 3.2* WBC 5.8 6.1 6.3 HGB 11.4* 11.0* 11.6 HCT 37.6 36.2 37.5  206 205 Lab Results Component Value Date/Time Cholesterol, total 104 08/01/2018 10:17 AM  
 HDL Cholesterol 33 (L) 08/01/2018 10:17 AM  
 LDL, calculated 49 08/01/2018 10:17 AM  
 Triglyceride 110 08/01/2018 10:17 AM  
 CHOL/HDL Ratio 4.7 04/03/2014 03:30 PM  
 
Recent Labs 11/14/18 0111 11/13/18 0107 11/12/18 
6464 SGOT 16 16 24 * 142* 161* TP 6.2* 6.3* 6.6 ALB 3.3* 3.0* 3.2*  
GLOB 2.9 3.3 3.4 LPSE  --   --  152 Recent Labs 11/14/18 0111 11/13/18 
0107 11/12/18 
1909 11/12/18 
9739 INR  --   --   --  1.4* PTP  --   --   --  14.4* APTT 63.6* 67.7* 61.3* 50.7* No components found for: [place] Current Facility-Administered Medications Medication Dose Route Frequency  metroNIDAZOLE (FLAGYL) IVPB premix 500 mg  500 mg IntraVENous Q12H  sodium chloride (NS) flush 5-10 mL  5-10 mL IntraVENous Q8H  
 sodium chloride (NS) flush 5-10 mL  5-10 mL IntraVENous PRN  
 acetaminophen (TYLENOL) tablet 650 mg  650 mg Oral Q6H PRN  
 fentaNYL citrate (PF) injection 50 mcg  50 mcg IntraVENous Q6H PRN  
 levoFLOXacin (LEVAQUIN) 750 mg in D5W IVPB  750 mg IntraVENous Q48H  
 oxyCODONE IR (ROXICODONE) tablet 5 mg  5 mg Oral Q6H PRN  
 ondansetron (ZOFRAN) injection 4 mg  4 mg IntraVENous Q6H PRN  
 docusate sodium (COLACE) capsule 100 mg  100 mg Oral BID  atorvastatin (LIPITOR) tablet 40 mg  40 mg Oral QHS  clonazePAM (KlonoPIN) tablet 0.5 mg  0.5 mg Oral QHS  potassium chloride SR (KLOR-CON 10) tablet 20 mEq  20 mEq Oral DAILY  pregabalin (LYRICA) capsule 200 mg  200 mg Oral BID  venlafaxine-SR (EFFEXOR-XR) capsule 150 mg  150 mg Oral BID WITH MEALS  metoprolol succinate (TOPROL-XL) XL tablet 100 mg  100 mg Oral DAILY  bumetanide (BUMEX) injection 1 mg  1 mg IntraVENous BID  heparin 25,000 units in D5W 250 ml infusion  9.1-25 Units/kg/hr IntraVENous TITRATE  pantoprazole (PROTONIX) tablet 40 mg  40 mg Oral ACB  heparin (porcine) injection 4,000 Units  4,000 Units IntraVENous PRN  
 heparin (porcine) injection 2,000 Units  2,000 Units IntraVENous PRN  
 insulin lispro (HUMALOG) injection   SubCUTAneous AC&HS  
 glucose chewable tablet 16 g  4 Tab Oral PRN  
 dextrose (D50W) injection syrg 12.5-25 g  12.5-25 g IntraVENous PRN  
 glucagon (GLUCAGEN) injection 1 mg  1 mg IntraMUSCular PRN  
 labetalol (NORMODYNE;TRANDATE) injection 10 mg  10 mg IntraVENous Q2H PRN  
 lisinopril (PRINIVIL, ZESTRIL) tablet 10 mg  10 mg Oral DAILY  nitroglycerin (NITROBID) 2 % ointment 1 Inch  1 Inch Topical Q6H PRN  
 hydrALAZINE (APRESOLINE) 20 mg/mL injection 10 mg  10 mg IntraVENous Q4H PRN Kenyetta Keene MD

## 2018-11-14 NOTE — ANESTHESIA PREPROCEDURE EVALUATION
Anesthetic History No history of anesthetic complications Review of Systems / Medical History Patient summary reviewed, nursing notes reviewed and pertinent labs reviewed Pulmonary Sleep apnea: CPAP Asthma Neuro/Psych Headaches and psychiatric history Cardiovascular Hypertension CHF Dysrhythmias Pacemaker and CAD Exercise tolerance: <4 METS Comments: EF 15-20% in Oct this year High risk for surgery per cardiology with no modifiable risk factors GI/Hepatic/Renal 
  
GERD Renal disease: CRI Endo/Other Diabetes Obesity and arthritis Other Findings Comments: Unspecified hereditary and idiopathic peripheral neuropathy Physical Exam 
 
Airway Mallampati: II 
TM Distance: 4 - 6 cm Neck ROM: normal range of motion Mouth opening: Normal 
 
 Cardiovascular Regular rate and rhythm,  S1 and S2 normal,  no murmur, click, rub, or gallop Dental 
No notable dental hx Pulmonary Breath sounds clear to auscultation Abdominal 
GI exam deferred Other Findings Anesthetic Plan ASA: 4 Anesthesia type: general 
 
Monitoring Plan: BIS

## 2018-11-14 NOTE — PROGRESS NOTES
0700: Received bedside report from Michel Gillespie, off going nurse. Assumed care of patient. 1900: Bedside shift change report given to Michel Gillespie, RN (oncoming nurse). Report included the following information SBAR, Kardex, Intake/Output, MAR, Recent Results and Cardiac Rhythm Paced. SHIFT SUMMARY: 
 
 
 
 
 
[address] NURSING NOTE Admission Date 11/12/2018 Admission Diagnosis Acute on chronic systolic CHF (congestive heart failure) ([facility] [address] Hypokalemia Consults IP CONSULT TO GENERAL SURGERY 
IP CONSULT TO CARDIOLOGY Cardiac Monitoring [x] Yes [] No  
  
Purposeful Hourly Rounding [x] Yes   
Ananya Score Total Score: 4 Ananya score 3 or > [x] Bed Alarm [] Avasys [] 1:1 sitter [] Patient refused (Signed refusal form in chart) Preet Score Preet Score: 21 Preet score 14 or < [] PMT consult [] Wound Care consult  
 []  Specialty bed  [] Nutrition consult Influenza Vaccine Received Flu Vaccine for Current Season (usually Sept-March): Yes Oxygen needs? [x] Room air Oxygen @  []1L    []2L    []3L   []4L    []5L   []6L via NC Chronic home O2 use? [] Yes [] No 
Perform O2 challenge test and document in progress note using smartphrase (.Homeoxygen) Last bowel movement Urinary Catheter LDAs Peripheral IV 11/12/18 Left Hand (Active) Site Assessment Clean, dry, & intact 11/14/2018  4:00 PM  
Phlebitis Assessment 0 11/14/2018  4:00 PM  
Infiltration Assessment 0 11/14/2018  4:00 PM  
Dressing Status Clean, dry, & intact 11/14/2018  4:00 PM  
Dressing Type Transparent 11/14/2018  4:00 PM  
Hub Color/Line Status Blue;Flushed;Patent 11/14/2018  4:00 PM  
   
Peripheral IV 11/12/18 Right Antecubital (Active) Site Assessment Clean, dry, & intact 11/14/2018  4:00 PM  
Phlebitis Assessment 0 11/14/2018  4:00 PM  
Infiltration Assessment 0 11/14/2018  4:00 PM  
Dressing Status Clean, dry, & intact 11/14/2018  4:00 PM  
Dressing Type Transparent 11/14/2018  4:00 PM  
 Hub Color/Line Status Pink;Flushed;Patent 11/14/2018  4:00 PM  
                  
  
Readmission Risk Assessment Tool Score High Risk   
      
 30 Total Score 3 Has Seen PCP in Last 6 Months (Yes=3, No=0)  
 4 IP Visits Last 12 Months (1-3=4, 4=9, >4=11) 5 Pt. Coverage (Medicare=5 , Medicaid, or Self-Pay=4) 18 Charlson Comorbidity Score (Age + Comorbid Conditions) Criteria that do not apply:  
 . Living with Significant Other. Assisted Living. LTAC. SNF. or  
Rehab Patient Length of Stay (>5 days = 3) Expected Length of Stay 4d 2h Actual Length of Stay 2

## 2018-11-14 NOTE — CONSULTS
Palliative Medicine    Consult received to \"update POST form\"  This has already been done by Laura Marin from the [facility].  Will cancel palliative consult  Please call as needed  [phone]

## 2018-11-14 NOTE — PROGRESS NOTES
Admit Date: 2018 Subjective:  
 
Patient feeling much better. Objective:  
 
Blood pressure (!) 167/96, pulse 80, temperature 97.7 °F (36.5 °C), resp. rate 20, height 5' 9\" (1.753 m), weight 234 lb 5.6 oz (106.3 kg), SpO2 94 %. Temp (24hrs), Av.4 °F (36.3 °C), Min:96.8 °F (36 °C), Max:97.7 °F (36.5 °C) Physical Exam:  GENERAL: alert, cooperative, no distress, appears stated age, LUNG: clear to auscultation bilaterally, HEART: regular rate and rhythm, ABDOMEN: soft, non-tender. Bowel sounds normal. No masses,  no organomegaly, EXTREMITIES:  extremities normal, atraumatic, no cyanosis or edema Labs:  
Recent Results (from the past 24 hour(s)) GLUCOSE, POC Collection Time: 18 12:08 PM  
Result Value Ref Range Glucose (POC) 98 65 - 100 mg/dL Performed by Whitney Fuel  (PCT) GLUCOSE, POC Collection Time: 18  4:56 PM  
Result Value Ref Range Glucose (POC) 123 (H) 65 - 100 mg/dL Performed by Whitney Fuel  (PCT) GLUCOSE, POC Collection Time: 18  8:30 PM  
Result Value Ref Range Glucose (POC) 104 (H) 65 - 100 mg/dL Performed by FAYE HUBER   
PTT Collection Time: 18  1:11 AM  
Result Value Ref Range aPTT 63.6 (H) 22.1 - 32.0 sec  
 aPTT, therapeutic range     58.0 - 77.0 SECS  
CBC W/O DIFF Collection Time: 18  1:11 AM  
Result Value Ref Range WBC 5.8 3.6 - 11.0 K/uL  
 RBC 3.96 3.80 - 5.20 M/uL  
 HGB 11.4 (L) 11.5 - 16.0 g/dL HCT 37.6 35.0 - 47.0 % MCV 94.9 80.0 - 99.0 FL  
 MCH 28.8 26.0 - 34.0 PG  
 MCHC 30.3 30.0 - 36.5 g/dL  
 RDW 18.3 (H) 11.5 - 14.5 % PLATELET 023 488 - 989 K/uL MPV 11.8 8.9 - 12.9 FL  
 NRBC 0.0 0  WBC ABSOLUTE NRBC 0.00 0.00 - 0.01 K/uL METABOLIC PANEL, COMPREHENSIVE Collection Time: 18  1:11 AM  
Result Value Ref Range Sodium 144 136 - 145 mmol/L Potassium 3.8 3.5 - 5.1 mmol/L  Chloride 107 97 - 108 mmol/L  
 CO2 32 21 - 32 mmol/L  
 Anion gap 5 5 - 15 mmol/L Glucose 94 65 - 100 mg/dL BUN 17 6 - 20 MG/DL Creatinine 1.42 (H) 0.55 - 1.02 MG/DL  
 BUN/Creatinine ratio 12 12 - 20 GFR est AA 45 (L) >60 ml/min/1.73m2 GFR est non-AA 37 (L) >60 ml/min/1.73m2 Calcium 8.6 8.5 - 10.1 MG/DL Bilirubin, total 0.7 0.2 - 1.0 MG/DL  
 ALT (SGPT) 20 12 - 78 U/L  
 AST (SGOT) 16 15 - 37 U/L Alk. phosphatase 141 (H) 45 - 117 U/L Protein, total 6.2 (L) 6.4 - 8.2 g/dL Albumin 3.3 (L) 3.5 - 5.0 g/dL Globulin 2.9 2.0 - 4.0 g/dL A-G Ratio 1.1 1.1 - 2.2 GLUCOSE, POC Collection Time: 11/14/18  7:26 AM  
Result Value Ref Range Glucose (POC) 106 (H) 65 - 100 mg/dL Performed by Davi Hogan) Data Review images and reports reviewed Assessment:  
 
Principal Problem: 
  Acute on chronic systolic CHF (congestive heart failure) ([facility] [address] (11/12/2018) Active Problems: 
  HBP (high blood pressure) (2/22/2013) Chronic atrial fibrillation ([address] (6/5/2016) Hypokalemia (10/7/2018) Ischemic cardiomyopathy (11/13/2018) CAD (coronary artery disease) (11/13/2018) Epigastric abdominal pain (11/13/2018) S/P placement of cardiac pacemaker (11/13/2018) Stage 3 chronic kidney disease ([address] (11/13/2018) Plan/Recommendations/Medical Decision Making:  
 
Continue present treatment Hida Scan with  non-visualization of the gallbladder until she was given morphine, c/w chronic gallbladder dysfunction/chronic cholecystitis. Given severity of presenting symptoms and underlying medical issues, it is appropriate to proceed with laparoscopic cholecystectomy. Will give one more day for Pradaxa effects to wash out given higher risk of bleeding with this procedure and mildly impaired renal function. Discussed risks of bleeding, infection, conversion to open operation, injury to common bile duct.   Questions answered and consent obtained for laparoscopic cholecystectomy tomorrow morning. NPO after midnight and hold heparin drip after 0200 tomorrow morning. Souleymane Landis. Jann Sanchez MD, [address] [facility]

## 2018-11-14 NOTE — PERIOP NOTES
TRANSFER - IN REPORT: 
 
Verbal report received from [address] on [facility]  being received from [phone] for ordered procedure Report consisted of patients Situation, Background, Assessment and  
Recommendations(SBAR). Information from the following report(s) SBAR, ED Summary, Procedure Summary, Intake/Output and MAR was reviewed with the receiving nurse. Opportunity for questions and clarification was provided. Assessment completed upon patients arrival to unit and care assumed.

## 2018-11-14 NOTE — PROGRESS NOTES
[address] 
[phone] Medical Progress Note NAME: Aníbal Toro :  1950 MRM:  [id number] Date/Time: 2018  5:44 AM 
  
Subjective:  
 
80 y/o female admitted with CAD, mixed ischemic/tachycardia cardiomyopathy, chronic afib - s/p av node ablation and pacemaker placement admitted with acute on chronic systolic CHF with 11 lb weight gain and elevated BNP since last discharge and epigastric abdominal pain. Started on O2 and IV diuresis with improvement in breathing. Seen by surgery and placed on clear liquids along with IV levaquin and flagyl for possible cholecysititis. Has been seen by cardiology and med adjustment made. HIDA scan positive for chronic cholecystitis Patient is alert and is in NAD. Feels better and is lying flat without SOB. . Receiving PPI. Afebrile with WBC 5800. K+ up to 3.8 and creatinine is 1.42 with eGFR of 37. Alk phos slightly elevated. Past Medical History:  
Diagnosis Date  Anxiety 2018  Arthritis OA  Asthma  Diabetes ([address]  Essential hypertension  GERD (gastroesophageal reflux disease)  Hypercholesterolemia 2018  Hypertension  Long-term use of high-risk medication 2018  Neuropathy  Other ill-defined conditions(799.89) IBS, spinal stenosis  Plantar fasciitis  Psychiatric disorder   
 depression, anxiety  Sleep apnea   
 uses CPAP  Type 2 diabetes mellitus with diabetic neuropathy, without long-term current use of insulin ([address] 2016 ROS:  General: negative for fever, chills, sweats, weakness Respiratory:  positive for less SOB with activity Cardiology:  negative for chest pain, palpitations, orthopnea, PND, edema, syncope Gastrointestinal: positive for epigastric abdominal pain without N/V Genitourinary: negative for frequency, urgency, dysuria, hematuria, incontinence Muskuloskeletal : negative for arthralgia, myalgia Dermatological: negative for rash, ulceration, mole change, new lesion Neurological: negative for headache, dizziness, confusion, focal weakness, paresthesia, memory loss, gait disturbance Psychological: negative for anxiety, depression, agitation Review of systems otherwise negative Objective:  
 
 
Vitals:  
 
  
Last 24hrs VS reviewed since prior progress note. Most recent are: 
 
Visit Vitals BP (!) 147/99 Pulse 78 Temp 97.5 °F (36.4 °C) Resp 18 Ht 5' 9\" (1.753 m) Wt 234 lb 5.6 oz (106.3 kg) SpO2 97% BMI 34.61 kg/m² SpO2 Readings from Last 6 Encounters:  
11/14/18 97% 10/22/18 99% 09/10/18 96% 08/07/18 94% 08/01/18 96% 03/14/18 96% O2 Flow Rate (L/min): 2 l/min Intake/Output Summary (Last 24 hours) at 11/14/2018 0535 Last data filed at 11/14/2018 8931 Gross per 24 hour Intake 1337.17 ml Output 1800 ml Net -462.83 ml Telemetry reviewed:   normal sinus rhythm, Paced Tubes:   N/A 
X-Ray:  Chest xray - Small right pleural effusion suspected. No other evidence of acute finding CT head - no acute abnormality U/S abdomen - Cholelithiasis with mild gallbladder wall thickening and tenderness 
in the region of the gallbladder concerning for acute cholecystitis. Small right 
pleural effusion. Cortical thinning right kidney compatible with medical renal 
Disease. NM V/Q scan - Very low probability for pulmonary embolism NM hepatobiliary scan -There is delayed activity identified within the gallbladder 
consistent with chronic cholecystitis Procedures:   N/A Exam:  
 
General   well developed, well nourished, appears stated age, in no acute distress Neck   Supple without nodes or mass. No thyromegaly or bruit Respiratory   Much clearer with no rales heard Cardiology  Regular Rate and Rythmn  - no murmurs, rubs or gallops Abdominal  Soft, non-tender, non-distended, positive bowel sounds, no hepatosplenomegaly Extremities  1+ edema present Skin  Normal skin turgor. No rashes or skin ulcers noted Neurological  No focal neurological deficits noted Psychological  Oriented x 3. Normal affect. Exam otherwise negative Lab Data Reviewed: (see below) Medications Reviewed: (see below) 
 
______________________________________________________________________ Medications:  
 
Current Facility-Administered Medications Medication Dose Route Frequency  metroNIDAZOLE (FLAGYL) IVPB premix 500 mg  500 mg IntraVENous Q12H  
 sodium chloride (NS) flush 5-10 mL  5-10 mL IntraVENous Q8H  
 sodium chloride (NS) flush 5-10 mL  5-10 mL IntraVENous PRN  
 acetaminophen (TYLENOL) tablet 650 mg  650 mg Oral Q6H PRN  
 fentaNYL citrate (PF) injection 50 mcg  50 mcg IntraVENous Q6H PRN  
 levoFLOXacin (LEVAQUIN) 750 mg in D5W IVPB  750 mg IntraVENous Q48H  
 oxyCODONE IR (ROXICODONE) tablet 5 mg  5 mg Oral Q6H PRN  
 ondansetron (ZOFRAN) injection 4 mg  4 mg IntraVENous Q6H PRN  
 docusate sodium (COLACE) capsule 100 mg  100 mg Oral BID  atorvastatin (LIPITOR) tablet 40 mg  40 mg Oral QHS  clonazePAM (KlonoPIN) tablet 0.5 mg  0.5 mg Oral QHS  potassium chloride SR (KLOR-CON 10) tablet 20 mEq  20 mEq Oral DAILY  pregabalin (LYRICA) capsule 200 mg  200 mg Oral BID  venlafaxine-SR (EFFEXOR-XR) capsule 150 mg  150 mg Oral BID WITH MEALS  metoprolol succinate (TOPROL-XL) XL tablet 100 mg  100 mg Oral DAILY  bumetanide (BUMEX) injection 1 mg  1 mg IntraVENous BID  heparin 25,000 units in D5W 250 ml infusion  9.1-25 Units/kg/hr IntraVENous TITRATE  pantoprazole (PROTONIX) tablet 40 mg  40 mg Oral ACB  heparin (porcine) injection 4,000 Units  4,000 Units IntraVENous PRN  
 heparin (porcine) injection 2,000 Units  2,000 Units IntraVENous PRN  
 insulin lispro (HUMALOG) injection   SubCUTAneous AC&HS  
  glucose chewable tablet 16 g  4 Tab Oral PRN  
 dextrose (D50W) injection syrg 12.5-25 g  12.5-25 g IntraVENous PRN  
 glucagon (GLUCAGEN) injection 1 mg  1 mg IntraMUSCular PRN  
 labetalol (NORMODYNE;TRANDATE) injection 10 mg  10 mg IntraVENous Q2H PRN  
 lisinopril (PRINIVIL, ZESTRIL) tablet 10 mg  10 mg Oral DAILY  nitroglycerin (NITROBID) 2 % ointment 1 Inch  1 Inch Topical Q6H PRN  
 hydrALAZINE (APRESOLINE) 20 mg/mL injection 10 mg  10 mg IntraVENous Q4H PRN Lab Review:  
 
Recent Labs 11/14/18 
0111 11/13/18 
0107 11/12/18 
5026 WBC 5.8 6.1 6.3 HGB 11.4* 11.0* 11.6 HCT 37.6 36.2 37.5  206 205 Recent Labs 11/14/18 
0111 11/13/18 
0107 11/12/18 
2867 11/12/18 
0141  145  --  146*  
K 3.8 3.8  --  2.8*  
 107  --  109* CO2 32 31  --  29  
GLU 94 105*  --  122* BUN 17 16  --  21* CREA 1.42* 1.40*  --  1.45* CA 8.6 8.0*  --  8.3*  
MG  --  1.7 1.8  --   
ALB 3.3* 3.0*  --  3.2* TBILI 0.7 0.7  --  0.9 SGOT 16 16  --  24 ALT 20 21  --  23 INR  --   --  1.4*  --   
 
Lab Results Component Value Date/Time Glucose (POC) 104 (H) 11/13/2018 08:30 PM  
 Glucose (POC) 123 (H) 11/13/2018 04:56 PM  
 Glucose (POC) 98 11/13/2018 12:08 PM  
 Glucose (POC) 124 (H) 11/13/2018 08:04 AM  
 Glucose (POC) 112 (H) 11/12/2018 08:45 PM  
 
No results for input(s): PH, PCO2, PO2, HCO3, FIO2 in the last 72 hours. Recent Labs 11/12/18 
3180 INR 1.4* Assessment:  
 
Principal Problem: 
  Acute on chronic systolic CHF (congestive heart failure) ([facility] (11/12/2018) Active Problems: 
  HBP (high blood pressure) (2/22/2013) Chronic atrial fibrillation ([facility] (6/5/2016) Ischemic cardiomyopathy (11/13/2018) CAD (coronary artery disease) (11/13/2018) Stage 3 chronic kidney disease ([facility] (11/13/2018) Hypokalemia (10/7/2018) Epigastric abdominal pain (11/13/2018) S/P placement of cardiac pacemaker (11/13/2018) Plan:  
 
Risk of deterioration: medium 1. Continue IV diuresis 2. Monitor renal function 3. On clear liquids 4. IV levaquin and flagyl 5. On heparin drip 6. Lap becky per surgery 7. Cardiology and surgery help appreciated Care Plan discussed with: Patient Discussed:  Care Plan Prophylaxis:  H2B/PPI and heparin drip Disposition:  Home w/Family 
        
___________________________________________________ Attending Physician: Gisselle Gonzalez MD

## 2018-11-14 NOTE — PROGRESS NOTES
Bedside shift change report given to Francesco Kraft RN (oncoming nurse). Report included the following information SBAR, Kardex, Intake/Output, MAR and Recent Results. SHIFT SUMMARY: 
 
 
 
 
 
[address] NURSING NOTE Admission Date 11/12/2018 Admission Diagnosis Acute on chronic systolic CHF (congestive heart failure) ([facility] Hypokalemia Consults IP CONSULT TO GENERAL SURGERY 
IP CONSULT TO PALLIATIVE CARE - PROVIDER 
IP CONSULT TO CARDIOLOGY Cardiac Monitoring [x] Yes [] No  
  
Purposeful Hourly Rounding [x] Yes   
Ananya Score Total Score: 4 Ananya score 3 or > [x] Bed Alarm [] Avasys [] 1:1 sitter [] Patient refused (Signed refusal form in chart) Preet Score Preet Score: 21 Preet score 14 or < [] PMT consult [] Wound Care consult  
 []  Specialty bed  [] Nutrition consult Influenza Vaccine Received Flu Vaccine for Current Season (usually Sept-March): Yes Oxygen needs? [x] Room air Oxygen @  []1L    []2L    []3L   []4L    []5L   []6L via NC Chronic home O2 use? [] Yes [x] No 
Perform O2 challenge test and document in progress note using smartphrase (.Homeoxygen) Last bowel movement Urinary Catheter LDAs Peripheral IV 11/12/18 Left Hand (Active) Site Assessment Clean, dry, & intact 11/13/2018  7:10 PM  
Phlebitis Assessment 0 11/13/2018  7:10 PM  
Infiltration Assessment 0 11/13/2018  7:10 PM  
Dressing Status Clean, dry, & intact 11/13/2018  7:10 PM  
Dressing Type Transparent 11/13/2018  7:10 PM  
Hub Color/Line Status Blue;Capped 11/13/2018  7:10 PM  
   
Peripheral IV 11/12/18 Right Antecubital (Active) Site Assessment Clean, dry, & intact 11/13/2018  7:10 PM  
Phlebitis Assessment 0 11/13/2018  7:10 PM  
Infiltration Assessment 0 11/13/2018  7:10 PM  
Dressing Status Clean, dry, & intact 11/13/2018  7:10 PM  
Dressing Type Transparent 11/13/2018  7:10 PM  
Hub Color/Line Status Pink; Infusing 11/13/2018  7:10 PM  
                  
  
 Readmission Risk Assessment Tool Score High Risk   
      
 30 Total Score 3 Has Seen PCP in Last 6 Months (Yes=3, No=0)  
 4 IP Visits Last 12 Months (1-3=4, 4=9, >4=11) 5 Pt. Coverage (Medicare=5 , Medicaid, or Self-Pay=4) 18 Charlson Comorbidity Score (Age + Comorbid Conditions) Criteria that do not apply:  
 . Living with Significant Other. Assisted Living. LTAC. SNF. or  
Rehab Patient Length of Stay (>5 days = 3) Expected Length of Stay 4d 2h Actual Length of Stay 1

## 2018-11-15 LAB
ALBUMIN SERPL-MCNC: 3.3 G/DL (ref 3.5–5)
ALBUMIN/GLOB SERPL: 1.1 {RATIO} (ref 1.1–2.2)
ALP SERPL-CCNC: 132 U/L (ref 45–117)
ALT SERPL-CCNC: 19 U/L (ref 12–78)
ANION GAP SERPL CALC-SCNC: 6 MMOL/L (ref 5–15)
APTT PPP: 51.8 SEC (ref 22.1–32)
AST SERPL-CCNC: 16 U/L (ref 15–37)
BILIRUB SERPL-MCNC: 0.7 MG/DL (ref 0.2–1)
BUN SERPL-MCNC: 17 MG/DL (ref 6–20)
BUN/CREAT SERPL: 11 (ref 12–20)
CALCIUM SERPL-MCNC: 9.3 MG/DL (ref 8.5–10.1)
CHLORIDE SERPL-SCNC: 108 MMOL/L (ref 97–108)
CO2 SERPL-SCNC: 30 MMOL/L (ref 21–32)
CREAT SERPL-MCNC: 1.6 MG/DL (ref 0.55–1.02)
ERYTHROCYTE [DISTWIDTH] IN BLOOD BY AUTOMATED COUNT: 18.3 % (ref 11.5–14.5)
GLOBULIN SER CALC-MCNC: 3 G/DL (ref 2–4)
GLUCOSE BLD STRIP.AUTO-MCNC: 109 MG/DL (ref 65–100)
GLUCOSE BLD STRIP.AUTO-MCNC: 115 MG/DL (ref 65–100)
GLUCOSE BLD STRIP.AUTO-MCNC: 88 MG/DL (ref 65–100)
GLUCOSE BLD STRIP.AUTO-MCNC: 94 MG/DL (ref 65–100)
GLUCOSE BLD STRIP.AUTO-MCNC: 97 MG/DL (ref 65–100)
GLUCOSE SERPL-MCNC: 94 MG/DL (ref 65–100)
HCT VFR BLD AUTO: 38.5 % (ref 35–47)
HGB BLD-MCNC: 11.8 G/DL (ref 11.5–16)
MCH RBC QN AUTO: 29 PG (ref 26–34)
MCHC RBC AUTO-ENTMCNC: 30.6 G/DL (ref 30–36.5)
MCV RBC AUTO: 94.6 FL (ref 80–99)
NRBC # BLD: 0 K/UL (ref 0–0.01)
NRBC BLD-RTO: 0 PER 100 WBC
PLATELET # BLD AUTO: 198 K/UL (ref 150–400)
PMV BLD AUTO: 12.1 FL (ref 8.9–12.9)
POTASSIUM SERPL-SCNC: 3.5 MMOL/L (ref 3.5–5.1)
PROT SERPL-MCNC: 6.3 G/DL (ref 6.4–8.2)
RBC # BLD AUTO: 4.07 M/UL (ref 3.8–5.2)
SERVICE CMNT-IMP: ABNORMAL
SERVICE CMNT-IMP: ABNORMAL
SERVICE CMNT-IMP: NORMAL
SODIUM SERPL-SCNC: 144 MMOL/L (ref 136–145)
THERAPEUTIC RANGE,PTTT: ABNORMAL SECS (ref 58–77)
WBC # BLD AUTO: 6 K/UL (ref 3.6–11)

## 2018-11-15 PROCEDURE — 77030002933 HC SUT MCRYL J&J -A: Performed by: SURGERY

## 2018-11-15 PROCEDURE — 77030019908 HC STETH ESOPH SIMS -A: Performed by: NURSE ANESTHETIST, CERTIFIED REGISTERED

## 2018-11-15 PROCEDURE — 77030008756 HC TU IRR SUC STRY -B: Performed by: SURGERY

## 2018-11-15 PROCEDURE — 77030032490 HC SLV COMPR SCD KNE COVD -B: Performed by: SURGERY

## 2018-11-15 PROCEDURE — 74011000250 HC RX REV CODE- 250: Performed by: HOSPITALIST

## 2018-11-15 PROCEDURE — 76010000149 HC OR TIME 1 TO 1.5 HR: Performed by: SURGERY

## 2018-11-15 PROCEDURE — 88304 TISSUE EXAM BY PATHOLOGIST: CPT

## 2018-11-15 PROCEDURE — 77030010513 HC APPL CLP LIG J&J -C: Performed by: SURGERY

## 2018-11-15 PROCEDURE — 74011250636 HC RX REV CODE- 250/636: Performed by: INTERNAL MEDICINE

## 2018-11-15 PROCEDURE — 77030011640 HC PAD GRND REM COVD -A: Performed by: SURGERY

## 2018-11-15 PROCEDURE — 74011250636 HC RX REV CODE- 250/636: Performed by: ANESTHESIOLOGY

## 2018-11-15 PROCEDURE — 77030032490 HC SLV COMPR SCD KNE COVD -B

## 2018-11-15 PROCEDURE — 77030008684 HC TU ET CUF COVD -B: Performed by: NURSE ANESTHETIST, CERTIFIED REGISTERED

## 2018-11-15 PROCEDURE — 77030020782 HC GWN BAIR PAWS FLX 3M -B

## 2018-11-15 PROCEDURE — 74011000250 HC RX REV CODE- 250: Performed by: SURGERY

## 2018-11-15 PROCEDURE — 74011250636 HC RX REV CODE- 250/636

## 2018-11-15 PROCEDURE — 77030020053 HC ELECTRD LAPSCP COVD -B: Performed by: SURGERY

## 2018-11-15 PROCEDURE — 77030035051: Performed by: SURGERY

## 2018-11-15 PROCEDURE — 77030031139 HC SUT VCRL2 J&J -A: Performed by: SURGERY

## 2018-11-15 PROCEDURE — 77030008771 HC TU NG SALEM SUMP -A: Performed by: NURSE ANESTHETIST, CERTIFIED REGISTERED

## 2018-11-15 PROCEDURE — 74011250637 HC RX REV CODE- 250/637: Performed by: HOSPITALIST

## 2018-11-15 PROCEDURE — 85730 THROMBOPLASTIN TIME PARTIAL: CPT

## 2018-11-15 PROCEDURE — 77030026438 HC STYL ET INTUB CARD -A: Performed by: NURSE ANESTHETIST, CERTIFIED REGISTERED

## 2018-11-15 PROCEDURE — 76210000006 HC OR PH I REC 0.5 TO 1 HR: Performed by: SURGERY

## 2018-11-15 PROCEDURE — 77030037892: Performed by: SURGERY

## 2018-11-15 PROCEDURE — 47562 LAPAROSCOPIC CHOLECYSTECTOMY: CPT | Performed by: SURGERY

## 2018-11-15 PROCEDURE — 85027 COMPLETE CBC AUTOMATED: CPT

## 2018-11-15 PROCEDURE — 74011250637 HC RX REV CODE- 250/637: Performed by: INTERNAL MEDICINE

## 2018-11-15 PROCEDURE — 74011250637 HC RX REV CODE- 250/637: Performed by: SURGERY

## 2018-11-15 PROCEDURE — 80053 COMPREHEN METABOLIC PANEL: CPT

## 2018-11-15 PROCEDURE — 65660000000 HC RM CCU STEPDOWN

## 2018-11-15 PROCEDURE — 77030027138 HC INCENT SPIROMETER -A

## 2018-11-15 PROCEDURE — 36415 COLL VENOUS BLD VENIPUNCTURE: CPT

## 2018-11-15 PROCEDURE — 76060000033 HC ANESTHESIA 1 TO 1.5 HR: Performed by: SURGERY

## 2018-11-15 PROCEDURE — 74011000250 HC RX REV CODE- 250

## 2018-11-15 PROCEDURE — 77030035048 HC TRCR ENDOSC OPTCL COVD -B: Performed by: SURGERY

## 2018-11-15 PROCEDURE — 94760 N-INVAS EAR/PLS OXIMETRY 1: CPT

## 2018-11-15 PROCEDURE — 0FT44ZZ RESECTION OF GALLBLADDER, PERCUTANEOUS ENDOSCOPIC APPROACH: ICD-10-PCS | Performed by: SURGERY

## 2018-11-15 PROCEDURE — 77030039266 HC ADH SKN EXOFIN S2SG -A: Performed by: SURGERY

## 2018-11-15 PROCEDURE — 82962 GLUCOSE BLOOD TEST: CPT

## 2018-11-15 PROCEDURE — 77030035220 HC TRCR ENDOSC BLNTPRT ANCHR COVD -B: Performed by: SURGERY

## 2018-11-15 RX ORDER — MIDAZOLAM HYDROCHLORIDE 1 MG/ML
1 INJECTION, SOLUTION INTRAMUSCULAR; INTRAVENOUS AS NEEDED
Status: DISCONTINUED | OUTPATIENT
Start: 2018-11-15 | End: 2018-11-15 | Stop reason: HOSPADM

## 2018-11-15 RX ORDER — FENTANYL CITRATE 50 UG/ML
25 INJECTION, SOLUTION INTRAMUSCULAR; INTRAVENOUS
Status: DISCONTINUED | OUTPATIENT
Start: 2018-11-15 | End: 2018-11-15 | Stop reason: HOSPADM

## 2018-11-15 RX ORDER — MIDAZOLAM HYDROCHLORIDE 1 MG/ML
INJECTION, SOLUTION INTRAMUSCULAR; INTRAVENOUS AS NEEDED
Status: DISCONTINUED | OUTPATIENT
Start: 2018-11-15 | End: 2018-11-15 | Stop reason: HOSPADM

## 2018-11-15 RX ORDER — SODIUM CHLORIDE, SODIUM LACTATE, POTASSIUM CHLORIDE, CALCIUM CHLORIDE 600; 310; 30; 20 MG/100ML; MG/100ML; MG/100ML; MG/100ML
125 INJECTION, SOLUTION INTRAVENOUS CONTINUOUS
Status: DISCONTINUED | OUTPATIENT
Start: 2018-11-15 | End: 2018-11-15 | Stop reason: HOSPADM

## 2018-11-15 RX ORDER — ROCURONIUM BROMIDE 10 MG/ML
INJECTION, SOLUTION INTRAVENOUS AS NEEDED
Status: DISCONTINUED | OUTPATIENT
Start: 2018-11-15 | End: 2018-11-15 | Stop reason: HOSPADM

## 2018-11-15 RX ORDER — DIPHENHYDRAMINE HYDROCHLORIDE 50 MG/ML
12.5 INJECTION, SOLUTION INTRAMUSCULAR; INTRAVENOUS AS NEEDED
Status: DISCONTINUED | OUTPATIENT
Start: 2018-11-15 | End: 2018-11-15 | Stop reason: HOSPADM

## 2018-11-15 RX ORDER — SODIUM CHLORIDE 0.9 % (FLUSH) 0.9 %
5-10 SYRINGE (ML) INJECTION AS NEEDED
Status: DISCONTINUED | OUTPATIENT
Start: 2018-11-15 | End: 2018-11-15 | Stop reason: HOSPADM

## 2018-11-15 RX ORDER — LIDOCAINE HYDROCHLORIDE 20 MG/ML
INJECTION, SOLUTION EPIDURAL; INFILTRATION; INTRACAUDAL; PERINEURAL AS NEEDED
Status: DISCONTINUED | OUTPATIENT
Start: 2018-11-15 | End: 2018-11-15 | Stop reason: HOSPADM

## 2018-11-15 RX ORDER — POTASSIUM CHLORIDE 7.45 MG/ML
10 INJECTION INTRAVENOUS ONCE
Status: COMPLETED | OUTPATIENT
Start: 2018-11-15 | End: 2018-11-15

## 2018-11-15 RX ORDER — SODIUM CHLORIDE, SODIUM LACTATE, POTASSIUM CHLORIDE, CALCIUM CHLORIDE 600; 310; 30; 20 MG/100ML; MG/100ML; MG/100ML; MG/100ML
25 INJECTION, SOLUTION INTRAVENOUS CONTINUOUS
Status: DISCONTINUED | OUTPATIENT
Start: 2018-11-15 | End: 2018-11-15 | Stop reason: HOSPADM

## 2018-11-15 RX ORDER — LIDOCAINE HYDROCHLORIDE 10 MG/ML
0.1 INJECTION, SOLUTION EPIDURAL; INFILTRATION; INTRACAUDAL; PERINEURAL AS NEEDED
Status: DISCONTINUED | OUTPATIENT
Start: 2018-11-15 | End: 2018-11-15 | Stop reason: HOSPADM

## 2018-11-15 RX ORDER — MORPHINE SULFATE 10 MG/ML
2 INJECTION, SOLUTION INTRAMUSCULAR; INTRAVENOUS
Status: DISCONTINUED | OUTPATIENT
Start: 2018-11-15 | End: 2018-11-15 | Stop reason: HOSPADM

## 2018-11-15 RX ORDER — SUCCINYLCHOLINE CHLORIDE 20 MG/ML
INJECTION INTRAMUSCULAR; INTRAVENOUS AS NEEDED
Status: DISCONTINUED | OUTPATIENT
Start: 2018-11-15 | End: 2018-11-15 | Stop reason: HOSPADM

## 2018-11-15 RX ORDER — FENTANYL CITRATE 50 UG/ML
50 INJECTION, SOLUTION INTRAMUSCULAR; INTRAVENOUS AS NEEDED
Status: DISCONTINUED | OUTPATIENT
Start: 2018-11-15 | End: 2018-11-15 | Stop reason: HOSPADM

## 2018-11-15 RX ORDER — BUPIVACAINE HYDROCHLORIDE 5 MG/ML
INJECTION, SOLUTION EPIDURAL; INTRACAUDAL AS NEEDED
Status: DISCONTINUED | OUTPATIENT
Start: 2018-11-15 | End: 2018-11-15 | Stop reason: HOSPADM

## 2018-11-15 RX ORDER — PROPOFOL 10 MG/ML
INJECTION, EMULSION INTRAVENOUS AS NEEDED
Status: DISCONTINUED | OUTPATIENT
Start: 2018-11-15 | End: 2018-11-15 | Stop reason: HOSPADM

## 2018-11-15 RX ORDER — SODIUM CHLORIDE 0.9 % (FLUSH) 0.9 %
5-10 SYRINGE (ML) INJECTION EVERY 8 HOURS
Status: DISCONTINUED | OUTPATIENT
Start: 2018-11-15 | End: 2018-11-15 | Stop reason: HOSPADM

## 2018-11-15 RX ORDER — DABIGATRAN ETEXILATE 150 MG/1
150 CAPSULE ORAL 2 TIMES DAILY
Status: DISCONTINUED | OUTPATIENT
Start: 2018-11-15 | End: 2018-11-18 | Stop reason: HOSPADM

## 2018-11-15 RX ORDER — OXYCODONE HYDROCHLORIDE 5 MG/1
5 TABLET ORAL AS NEEDED
Status: DISCONTINUED | OUTPATIENT
Start: 2018-11-15 | End: 2018-11-15 | Stop reason: HOSPADM

## 2018-11-15 RX ORDER — ONDANSETRON 2 MG/ML
4 INJECTION INTRAMUSCULAR; INTRAVENOUS AS NEEDED
Status: DISCONTINUED | OUTPATIENT
Start: 2018-11-15 | End: 2018-11-15 | Stop reason: HOSPADM

## 2018-11-15 RX ORDER — FENTANYL CITRATE 50 UG/ML
INJECTION, SOLUTION INTRAMUSCULAR; INTRAVENOUS AS NEEDED
Status: DISCONTINUED | OUTPATIENT
Start: 2018-11-15 | End: 2018-11-15 | Stop reason: HOSPADM

## 2018-11-15 RX ORDER — PHENYLEPHRINE HCL IN 0.9% NACL 0.4MG/10ML
SYRINGE (ML) INTRAVENOUS AS NEEDED
Status: DISCONTINUED | OUTPATIENT
Start: 2018-11-15 | End: 2018-11-15 | Stop reason: HOSPADM

## 2018-11-15 RX ORDER — POTASSIUM CHLORIDE 7.45 MG/ML
10 INJECTION INTRAVENOUS
Status: DISPENSED | OUTPATIENT
Start: 2018-11-15 | End: 2018-11-15

## 2018-11-15 RX ORDER — ONDANSETRON 2 MG/ML
INJECTION INTRAMUSCULAR; INTRAVENOUS AS NEEDED
Status: DISCONTINUED | OUTPATIENT
Start: 2018-11-15 | End: 2018-11-15 | Stop reason: HOSPADM

## 2018-11-15 RX ORDER — MIDAZOLAM HYDROCHLORIDE 1 MG/ML
0.5 INJECTION, SOLUTION INTRAMUSCULAR; INTRAVENOUS
Status: DISCONTINUED | OUTPATIENT
Start: 2018-11-15 | End: 2018-11-15 | Stop reason: HOSPADM

## 2018-11-15 RX ORDER — HYDROMORPHONE HYDROCHLORIDE 1 MG/ML
0.2 INJECTION, SOLUTION INTRAMUSCULAR; INTRAVENOUS; SUBCUTANEOUS
Status: DISCONTINUED | OUTPATIENT
Start: 2018-11-15 | End: 2018-11-15 | Stop reason: HOSPADM

## 2018-11-15 RX ADMIN — PREGABALIN 200 MG: 150 CAPSULE ORAL at 17:03

## 2018-11-15 RX ADMIN — LABETALOL HYDROCHLORIDE 10 MG: 5 INJECTION INTRAVENOUS at 18:15

## 2018-11-15 RX ADMIN — POTASSIUM CHLORIDE 10 MEQ: 10 INJECTION, SOLUTION INTRAVENOUS at 06:31

## 2018-11-15 RX ADMIN — PROPOFOL 140 MG: 10 INJECTION, EMULSION INTRAVENOUS at 12:44

## 2018-11-15 RX ADMIN — DOCUSATE SODIUM 100 MG: 100 CAPSULE, LIQUID FILLED ORAL at 17:03

## 2018-11-15 RX ADMIN — Medication 10 ML: at 20:40

## 2018-11-15 RX ADMIN — DOCUSATE SODIUM 100 MG: 100 CAPSULE, LIQUID FILLED ORAL at 08:14

## 2018-11-15 RX ADMIN — ONDANSETRON 4 MG: 2 INJECTION INTRAMUSCULAR; INTRAVENOUS at 13:08

## 2018-11-15 RX ADMIN — FENTANYL CITRATE 50 MCG: 50 INJECTION, SOLUTION INTRAMUSCULAR; INTRAVENOUS at 13:45

## 2018-11-15 RX ADMIN — ROCURONIUM BROMIDE 20 MG: 10 INJECTION, SOLUTION INTRAVENOUS at 12:52

## 2018-11-15 RX ADMIN — POTASSIUM CHLORIDE 10 MEQ: 10 INJECTION, SOLUTION INTRAVENOUS at 08:19

## 2018-11-15 RX ADMIN — Medication 80 MCG: at 12:52

## 2018-11-15 RX ADMIN — METOPROLOL SUCCINATE 100 MG: 50 TABLET, EXTENDED RELEASE ORAL at 08:15

## 2018-11-15 RX ADMIN — SUCCINYLCHOLINE CHLORIDE 160 MG: 20 INJECTION INTRAMUSCULAR; INTRAVENOUS at 12:44

## 2018-11-15 RX ADMIN — FENTANYL CITRATE 100 MCG: 50 INJECTION, SOLUTION INTRAMUSCULAR; INTRAVENOUS at 12:44

## 2018-11-15 RX ADMIN — ROCURONIUM BROMIDE 10 MG: 10 INJECTION, SOLUTION INTRAVENOUS at 12:44

## 2018-11-15 RX ADMIN — BUMETANIDE 1 MG: 0.25 INJECTION INTRAMUSCULAR; INTRAVENOUS at 08:13

## 2018-11-15 RX ADMIN — PANTOPRAZOLE SODIUM 40 MG: 40 TABLET, DELAYED RELEASE ORAL at 08:41

## 2018-11-15 RX ADMIN — SODIUM CHLORIDE, SODIUM LACTATE, POTASSIUM CHLORIDE, AND CALCIUM CHLORIDE 25 ML/HR: 600; 310; 30; 20 INJECTION, SOLUTION INTRAVENOUS at 11:30

## 2018-11-15 RX ADMIN — FENTANYL CITRATE 50 MCG: 50 INJECTION, SOLUTION INTRAMUSCULAR; INTRAVENOUS at 13:01

## 2018-11-15 RX ADMIN — POTASSIUM CHLORIDE 20 MEQ: 750 TABLET, FILM COATED, EXTENDED RELEASE ORAL at 08:14

## 2018-11-15 RX ADMIN — METRONIDAZOLE 500 MG: 500 INJECTION, SOLUTION INTRAVENOUS at 03:07

## 2018-11-15 RX ADMIN — BUMETANIDE 1 MG: 0.25 INJECTION INTRAMUSCULAR; INTRAVENOUS at 17:03

## 2018-11-15 RX ADMIN — LISINOPRIL 10 MG: 5 TABLET ORAL at 08:15

## 2018-11-15 RX ADMIN — ACETAMINOPHEN 650 MG: 325 TABLET ORAL at 18:07

## 2018-11-15 RX ADMIN — Medication 10 ML: at 14:49

## 2018-11-15 RX ADMIN — LIDOCAINE HYDROCHLORIDE 60 MG: 20 INJECTION, SOLUTION EPIDURAL; INFILTRATION; INTRACAUDAL; PERINEURAL at 12:44

## 2018-11-15 RX ADMIN — VENLAFAXINE HYDROCHLORIDE 150 MG: 150 CAPSULE, EXTENDED RELEASE ORAL at 08:15

## 2018-11-15 RX ADMIN — Medication 10 ML: at 03:07

## 2018-11-15 RX ADMIN — PROPOFOL 20 MG: 10 INJECTION, EMULSION INTRAVENOUS at 13:20

## 2018-11-15 RX ADMIN — DABIGATRAN ETEXILATE MESYLATE 150 MG: 150 CAPSULE ORAL at 17:03

## 2018-11-15 RX ADMIN — PROPOFOL 20 MG: 10 INJECTION, EMULSION INTRAVENOUS at 13:25

## 2018-11-15 RX ADMIN — VENLAFAXINE HYDROCHLORIDE 150 MG: 150 CAPSULE, EXTENDED RELEASE ORAL at 17:03

## 2018-11-15 RX ADMIN — OXYCODONE HYDROCHLORIDE 5 MG: 5 TABLET ORAL at 20:39

## 2018-11-15 RX ADMIN — PREGABALIN 200 MG: 150 CAPSULE ORAL at 08:14

## 2018-11-15 RX ADMIN — ATORVASTATIN CALCIUM 40 MG: 40 TABLET, FILM COATED ORAL at 20:39

## 2018-11-15 RX ADMIN — MIDAZOLAM HYDROCHLORIDE 1 MG: 1 INJECTION, SOLUTION INTRAMUSCULAR; INTRAVENOUS at 12:35

## 2018-11-15 RX ADMIN — CLONAZEPAM 0.5 MG: 0.5 TABLET ORAL at 20:39

## 2018-11-15 NOTE — PROGRESS NOTES
Attempted to see pt this pm and pt in PACU post CHOLECYSTECTOMY. Will need re-eval.  Please note any weight bearing restrictions. Will defer at this time. Thank you.

## 2018-11-15 NOTE — PROGRESS NOTES
105 [facility], [address] [place] [address] Ne, [address] [facility] 
[phone] Medical Progress Note NAME: Sasha Dunn :  1950 MRM:  [id number] Date/Time: 11/15/2018  5:49 AM 
  
Subjective:  
 
78 y/o female admitted with CAD, mixed ischemic/tachycardia cardiomyopathy, chronic afib - s/p av node ablation and pacemaker placement admitted with acute on chronic systolic CHF with 11 lb weight gain and elevated BNP since last discharge and epigastric abdominal pain. Started on O2 and IV diuresis with improvement in breathing. Seen by surgery and placed on clear liquids along with IV levaquin and flagyl for possible cholecysititis. Has been seen by cardiology and med adjustment made. HIDA scan positive for chronic cholecystitis Patient is alert and is in NAD. Feels better and is lying flat without SOB. . Receiving PPI. Afebrile with . K+ 3.5 and creatinine is 1.60with eGFR of 32. Alk phos slightly elevated. Past Medical History:  
Diagnosis Date  Anxiety 2018  Arthritis OA  Asthma  Diabetes ([address]  Essential hypertension  GERD (gastroesophageal reflux disease)  Hypercholesterolemia 2018  Hypertension  Long-term use of high-risk medication 2018  Neuropathy  Other ill-defined conditions(799.89) IBS, spinal stenosis  Plantar fasciitis  Psychiatric disorder   
 depression, anxiety  Sleep apnea   
 uses CPAP  Type 2 diabetes mellitus with diabetic neuropathy, without long-term current use of insulin ([address] 2016 ROS:  General: negative for fever, chills, sweats, weakness Respiratory:  positive for less SOB with activity Cardiology:  negative for chest pain, palpitations, orthopnea, PND, edema, syncope Gastrointestinal: positive for epigastric abdominal pain without N/V Genitourinary: negative for frequency, urgency, dysuria, hematuria, incontinence Muskuloskeletal : negative for arthralgia, myalgia Dermatological: negative for rash, ulceration, mole change, new lesion Neurological: negative for headache, dizziness, confusion, focal weakness, paresthesia, memory loss, gait disturbance Psychological: negative for anxiety, depression, agitation Review of systems otherwise negative Objective:  
 
 
Vitals:  
 
  
Last 24hrs VS reviewed since prior progress note. Most recent are: 
 
Visit Vitals /84 (BP 1 Location: Left arm, BP Patient Position: At rest;Lying right side) Pulse 80 Temp 97.4 °F (36.3 °C) Resp 18 Ht 5' 9\" (1.753 m) Wt 232 lb 5 oz (105.4 kg) SpO2 93% BMI 34.31 kg/m² SpO2 Readings from Last 6 Encounters:  
11/15/18 93% 10/22/18 99% 09/10/18 96% 08/07/18 94% 08/01/18 96% 03/14/18 96% O2 Flow Rate (L/min): 2 l/min Intake/Output Summary (Last 24 hours) at 11/15/2018 0079 Last data filed at 11/15/2018 1649 Gross per 24 hour Intake 840.5 ml Output 700 ml Net 140.5 ml Telemetry reviewed:   normal sinus rhythm, Paced Tubes:   N/A 
X-Ray:  Chest xray - Small right pleural effusion suspected. No other evidence of acute finding CT head - no acute abnormality U/S abdomen - Cholelithiasis with mild gallbladder wall thickening and tenderness 
in the region of the gallbladder concerning for acute cholecystitis. Small right 
pleural effusion. Cortical thinning right kidney compatible with medical renal 
Disease. NM V/Q scan - Very low probability for pulmonary embolism NM hepatobiliary scan -There is delayed activity identified within the gallbladder 
consistent with chronic cholecystitis Procedures:   N/A Exam:  
 
General   well developed, well nourished, appears stated age, in no acute distress Neck   Supple without nodes or mass. No thyromegaly or bruit Respiratory   Much clearer with no rales heard Cardiology  Regular Rate and Rythmn  - no murmurs, rubs or gallops Abdominal  Soft, non-tender, non-distended, positive bowel sounds, no hepatosplenomegaly Extremities  1+ edema present Skin  Normal skin turgor. No rashes or skin ulcers noted Neurological  No focal neurological deficits noted Psychological  Oriented x 3. Normal affect. Exam otherwise negative Lab Data Reviewed: (see below) Medications Reviewed: (see below) 
 
______________________________________________________________________ Medications:  
 
Current Facility-Administered Medications Medication Dose Route Frequency  metroNIDAZOLE (FLAGYL) IVPB premix 500 mg  500 mg IntraVENous Q12H  
 sodium chloride (NS) flush 5-10 mL  5-10 mL IntraVENous Q8H  
 sodium chloride (NS) flush 5-10 mL  5-10 mL IntraVENous PRN  
 acetaminophen (TYLENOL) tablet 650 mg  650 mg Oral Q6H PRN  
 fentaNYL citrate (PF) injection 50 mcg  50 mcg IntraVENous Q6H PRN  
 levoFLOXacin (LEVAQUIN) 750 mg in D5W IVPB  750 mg IntraVENous Q48H  
 oxyCODONE IR (ROXICODONE) tablet 5 mg  5 mg Oral Q6H PRN  
 ondansetron (ZOFRAN) injection 4 mg  4 mg IntraVENous Q6H PRN  
 docusate sodium (COLACE) capsule 100 mg  100 mg Oral BID  atorvastatin (LIPITOR) tablet 40 mg  40 mg Oral QHS  clonazePAM (KlonoPIN) tablet 0.5 mg  0.5 mg Oral QHS  potassium chloride SR (KLOR-CON 10) tablet 20 mEq  20 mEq Oral DAILY  pregabalin (LYRICA) capsule 200 mg  200 mg Oral BID  venlafaxine-SR (EFFEXOR-XR) capsule 150 mg  150 mg Oral BID WITH MEALS  metoprolol succinate (TOPROL-XL) XL tablet 100 mg  100 mg Oral DAILY  bumetanide (BUMEX) injection 1 mg  1 mg IntraVENous BID  pantoprazole (PROTONIX) tablet 40 mg  40 mg Oral ACB  heparin (porcine) injection 4,000 Units  4,000 Units IntraVENous PRN  
 heparin (porcine) injection 2,000 Units  2,000 Units IntraVENous PRN  
 insulin lispro (HUMALOG) injection   SubCUTAneous AC&HS  
 glucose chewable tablet 16 g  4 Tab Oral PRN  
  dextrose (D50W) injection syrg 12.5-25 g  12.5-25 g IntraVENous PRN  
 glucagon (GLUCAGEN) injection 1 mg  1 mg IntraMUSCular PRN  
 labetalol (NORMODYNE;TRANDATE) injection 10 mg  10 mg IntraVENous Q2H PRN  
 lisinopril (PRINIVIL, ZESTRIL) tablet 10 mg  10 mg Oral DAILY  nitroglycerin (NITROBID) 2 % ointment 1 Inch  1 Inch Topical Q6H PRN  
 hydrALAZINE (APRESOLINE) 20 mg/mL injection 10 mg  10 mg IntraVENous Q4H PRN Lab Review:  
 
Recent Labs 11/15/18 
0048 11/14/18 
0111 11/13/18 
0107 WBC 6.0 5.8 6.1 HGB 11.8 11.4* 11.0*  
HCT 38.5 37.6 36.2  205 206 Recent Labs 11/15/18 
0048 11/14/18 
0111 11/13/18 
0107 11/12/18 
7177  144 145  --   
K 3.5 3.8 3.8  --   
 107 107  --   
CO2 30 32 31  --   
GLU 94 94 105*  --   
BUN 17 17 16  --   
CREA 1.60* 1.42* 1.40*  --   
CA 9.3 8.6 8.0*  --   
MG  --   --  1.7 1.8 ALB 3.3* 3.3* 3.0*  --   
TBILI 0.7 0.7 0.7  --   
SGOT 16 16 16  --   
ALT 19 20 21  --   
INR  --   --   --  1.4* Lab Results Component Value Date/Time Glucose (POC) 94 11/14/2018 08:54 PM  
 Glucose (POC) 91 11/14/2018 04:57 PM  
 Glucose (POC) 100 11/14/2018 11:50 AM  
 Glucose (POC) 106 (H) 11/14/2018 07:26 AM  
 Glucose (POC) 104 (H) 11/13/2018 08:30 PM  
 
No results for input(s): PH, PCO2, PO2, HCO3, FIO2 in the last 72 hours. Recent Labs 11/12/18 
7124 INR 1.4* Assessment:  
 
Principal Problem: 
  Acute on chronic systolic CHF (congestive heart failure) ([facility] (11/12/2018) Active Problems: 
  HBP (high blood pressure) (2/22/2013) Chronic atrial fibrillation ([facility] (6/5/2016) Ischemic cardiomyopathy (11/13/2018) CAD (coronary artery disease) (11/13/2018) Stage 3 chronic kidney disease ([facility] (11/13/2018) Hypokalemia (10/7/2018) Epigastric abdominal pain (11/13/2018) S/P placement of cardiac pacemaker (11/13/2018) Plan:  
 
Risk of deterioration: medium 1. Continue IV diuresis 2. Monitor renal function 3. NPO 
4. IV levaquin and flagyl 5. On heparin drip 6. K+ 3.5 and will order IV KCL this AM 
7. Lap becky per surgery today 8. Cardiology and surgery help appreciated Care Plan discussed with: Patient Discussed:  Care Plan Prophylaxis:  H2B/PPI and heparin drip Disposition:  Home w/Family 
        
___________________________________________________ Attending Physician: Bernice Robledo MD

## 2018-11-15 NOTE — PROGRESS NOTES
Cardiology Progress Note 11/15/2018     Admit Date: 11/12/2018 Admit Diagnosis: Acute on chronic systolic CHF (congestive heart failure) ([address] Hypokalemia  CC: none currently Cardiac Assessment/Plan:  
Ms Melania Moreno has a h/o: 
1) CAD (LAD ISELA 2014 for NQMI), Neg PET for ischemia 9/2018. 
2) mixed ischemic/tachycardia mediated CM 4/2014 (EF 20%); EF 45% 11/2017.  Entresto was too expensive. *Low EF 10/9/18 (15-20%): Med Rx for now. 3) HTN, On lower dose meds last months admit due to low BPs after EP procedure: increased @ rehab. 4) PAfib (RFA 4/2016 and 1/2017), Recurrrent/persistent afib 2017/2018 *Chronic Afib now; BiV device in 10/18/18; AV node abation 10/19/18:  Off amiodarone; on Xarelto. 5) DM,  
6) SHAYLA (on CPAP),  
7) CKD (Cr 1.5 range).  Aldactone stopped in 2014.  
  
Rec: non-cardiac CP; Med Rx; Increase lisinopril to 10 qday; cont toprol XL 50 (increase as needed; prev on lisinopril 20/toprol  every day and norvasc as below). Restart pradaxa (instead of current hep gtt) when able. 
  
ID/GI, SHAYLA, DM, Dispo: per primary team.  
For other plans, see orders. 11/13: Improved HTN; No anginal CP (less reproducible chest wall pain); no MI. Not vol overloaded; stable chronic afib; Remains on hep gtt rather than Pradaxa. HIDA scan pending; If needs surgery, moderate-to-high risk due to CM; No obviously modifiable risk factors are noted (other than better BP control). If she's having a \"low bleeding-risk procedure,\" pradaxa should be held for 2 days (GFR 3-50); If the procedure is considered \"high bleeding-risk,\" the pradaxa should be held for 4 days. 11/14: + HIDA scan reported; plan for surgery noted; Cardiac risk as above. No new cardiac recs; BP needs better control; add prn NTG paste for jasmina-op; increase ACEi after surgery if needed. Cont bblocker is important (IV if NPO). 11/15: BPs improved; clinically stable; no new recs. _________________________________________________________________________________________ As previously noted: \"On d/c last month: \"To be off amio; D/C cardiac meds: lisinopril 2.5 qday; toprol XL 25 qday; pradaxa 150 bid; bumex 1 qday; KCL 10. Xarelto qday. ICD check 2 weeks; OV with me in 1 month. OK for rehab from cardiac standpoint. \" 
  
In rehab, she reports lisinopril increased to 5 qday and toprol XL to 50. 
____________________________________________________________________ 
  
The patient reports increased BPs (>200s/120), worse dyspnea; reproducible chest wall pain/abd pain/ 
No PND, orthopnea, palpitations, pre-syncope, syncope, new peripheral edema. Current c/o reproducible chest wall/abd pain. 
  
ECG: afib/Vpaced. Tele: afib/Vpaced. CXR: \"Small right pleural effusion suspected. No other evidence of acute finding. \" 
  
Notable labs: trops 0.08/0.08/0.06; Cr 1.45; k 2.8; Nl mag; proBNP 9k. Nl CBC. \" 
____________________________________________________________________________________________ Notable prior cardiac history:  @ OV 10/2/18: 
Ms Guy Tena has a h/o: 
1) CAD (LAD ISELA 2014 for NQMI), Neg PET for ischemia 9/2018. 
2) mixed ischemic/tachycardia mediated CM 4/2014 (EF 20%); EF 45% 11/2017.  Entresto was too expensive. 3) HTN, 4) PAfib (RFA 4/2016 and 1/2017), Recurrrent/persistent afib 2017/2018 5) DM,  
6) SHAYLA (on CPAP),  
7) CKD (Cr 1.5 range).  Aldactone stopped in 2014. 8) [facility] PPM 7/2014 for bradycardia while on therapy for AFib. 
  
11/2017: No CP/STEEN; Back in afib today:  Coreg changed Toprol-XL.   
10/2018:  Recent ER visit with epigastric pain; negative evaluation there & seen by Dr. Ambrosio Sequeira who set up a cardiac PET.  The PET shows no ischemia but EF suggested at 16%.  Of note patient does have AFib with accelerated ventricular response.  No recurrence of epigastric discomfort.  No bleeding.  Had a simple trip at home a few weeks ago without injury. 
  
IMPRESSION AND PLAN 
   
 01. (I25.10) Atherosclerotic heart disease of native coronary artery without angina pectoris:  No anginal symptoms. Cont asa and stopped plavix with need for anticoagulation. 
  
We discussed the signs and symptoms of unstable angina, myocardial infarction and malignant arrhythmia.  The patient knows to seek immediate medical attention should they occur.  ECG done today 02. (I42.0) Dilated cardiomyopathy:  Mixed ischemic/non-ischemic (tachycardia mediated) CM.  Her ejection fraction is greater than 35% after repeat echo.  The patient's systolic function has been stable. I suspect the PET has a falsely low EF related to her AFib but repeat echo is needed.  2-D w/CFD Echo to be done first available. 03. (I50.42) Chronic combined systolic (congestive) and diastolic (congestive) heart failure:  Resolved exertional symptoms.  (NYHA I)  The patient is compliant with their CHF regimen. 04. (I48.1) Persistent atrial fibrillation:  Management per Dr Rafael Matias; currently off amio and on Pradaxa.  Heart rate has been to elevated; increase Toprol  q.day.  Further AFib monitoring/therapy per Dr. Tena Monge. 05. (I13.0) Hyp hrt & chr kdny dis w hrt fail and stg 1-4/unsp chr kdny:  Adequately controlled.  We will see how the patient does with the med changes noted above. 06. (E78.2) Mixed hyperlipidemia:  Lipid labs drawn by PCP. The patient is tolerating lipid lowering therapy well. 07. (N18.3) Chronic kidney disease, stage 3 (moderate):  Cr 1.24/GFR46 11/2015.  Cr 1.32/GFR 43 1/2017.  
08. (R60.0) Localized edema: Mathew Bellis avoids salt. 09. (E11.69) Type 2 diabetes mellitus with other specified complication:  Lipids & HTN as noted above; DM managed by other MD.  
10. (Z95.0) Presence of cardiac pacemaker:  will continue to be followed in device clinic. 11. (Z79.82) Long term (current) use of aspirin:  This condition is stable.   
12. (Z79.01) Long term (current) use of anticoagulants:  This condition is stable.  Indicated for atrial fibrillation.  No bleeding. If she has recurrent falls, she knows to call for re-evaluation of anticoagulation. 13. (Z68.38) Body mass index (BMI) 38.0-38.9, adult:  The patient was instructed on AHA diet and regular exercise.  
  
ORDERS: 
       
1 ECG done today    
2 2-D w/ CFD Echocardiogram first available.    
3 Return office visit with Livier Little MD in 6 Months.    
4 The patient was instructed on AHA diet and regular exercise.    
  
  
10/2/18 MEDICATION LIST Medication Sig Description  
amlodipine 5 mg tablet take 1 tablet by oral route  every day per pc  
aspirin 81 mg tablet,delayed release take 1 tablet by oral route  every day  
atorvastatin 40 mg tablet take 1 tablet by oral route  every evening  
bumetanide 1 mg tablet 1 in am on tues&thursday 2 tablets on mon wed fridays Calcium 600 600 mg (1,500 mg) tablet daily  
clonazepam 0.5 mg tablet take 1 tablet by oral route  every day LISINOPRIL 20 MG Tablet TAKE 1 TABLET EVERY DAY Lyrica 200 mg capsule take 1 capsule by oral route 2 times every day  
magnesium 250 mg tablet take 1 tablet by oral route  every day  
metoprolol succinate  mg tablet,extended release 24 hr take 1 tablet by oral route  every day  
potassium gluconate 500 mg (83 mg) tablet daily Pradaxa 150 mg capsule take 1 capsule by oral route 2 times every day Premarin 0.625 mg/gram vaginal cream insert 0.5 applicatorful by vaginal route  every day cyclically, 3 weeks on and 1 week off  
venlafaxine 75 mg tablet 2 po bid Vitamin D3 1,000 unit capsule take 1 daily  
  
____________________________________________________________________________________________________ CARDIAC HISTORY 
CAD: 
     
1 NSTEMI [95% Proximal LAD, Resolute (ISELA) to the Proximal LAD.] - 4/8/2014  
  
CHF/CM: 
     
1 Mixed ischemic/tachycardic CM.  [Nl TSH.] - 4/2014  
2 Ischemic & tachycardic Cardiomyopathy [Echo (EF 0.45) - 06/17/2014, (prev EF 20-30%)] - 6/2014  
  
ARRHYTHMIA: 
     
1 A Fib [DCCV, Plan: amio started; Eliquis with Effient (change eliquis to asa if NSR after 30 days). ] - 4/8/2014  
2 PAF - 8/2010  
3 A Fib, recurrent; and 6/2014. [Had marked sinus bradycardia while on bblocker/dig.] - 5/2014  
4 Sinus Bradycardia. - 6/2/2014  
5 PPM (Devices (Dual Chamber PPM (Bassem Goody)) - 7/17/2014) - 7/17/2014  
6 PAF [CVR; Eliquis restarted (plavix stopped). ] - 9/2015  
7 A Fib [RFA] - 4/2016  
8 A Fib [RFA] - 1/2017  
  
RISK FACTORS: 
     
1 Diabetes [Diagnosed in 2014] 2 Hypertension 3 Dyslipidemia  
  
  
CARDIOVASCULAR PROCEDURES Procedure Date Results Cardiac PET 09/24/2018 EF 0.16 (16%), physiologic apical thinning with no ischemia Cath 04/08/2014 95% Proximal LAD, Resolute (ISELA) to the Proximal LAD. DCCV 04/08/2014 Initial Rhythm A Fib, Final Rhythm Sinus Devices 07/17/2014 Dual Chamber PPM (Bassem Goody) Echo 11/28/2017 EF 0.45 (45%), Mild Global Hypo, Mild TR, Mild LVH, Mild MR, Est RVSP 41 mmHg, Normal RV. (probable trileaflet AoV). Echo 10/30/2015 EF 0.45 (45%), Mild LVH, LA Diam 4.1 cm, Est RVSP 35 mmHg, Normal RV. ?bicuspid AoV; (Prob trileaflet on previous ANGELITO). Echo 06/17/2014 EF 0.45 (45%), EF range 45%-50%, Mild LV dilatation. Mild LV systolic dysfunction. ? Bicuspid AoV but prob trileafet on previous ANGELITO. Echo 04/03/2014 EF 0.20 (20%), global HK with lateral sparing; no valve disease. EKG 10/02/2018 Atrial Fib, ; nonspecific T-wave flattening. Holter 06/09/2014 No Malignant Arrhythmias, Atrial Fib, No correlation with symptoms, (, [address] Holter 04/30/2014 No Malignant Arrhythmias, Atrial Fib, No correlation with symptoms, \"light or heavy chest\" = afib in 60s. HR  (ave 63). ASx pauses (upto 2.8) while asleep. RFA 01/19/2017 Indication A Fib, Final Rhythm Sinus RFA   Indication A Fib Sleep Study   OSAS: Moderate ANGELITO 2014 EF 0.35 (35%), No BRAYDON Clot, prob trileaflet AoV. For other plans, see orders. Hospital problem list  
Active Hospital Problems Diagnosis Date Noted  Ischemic cardiomyopathy 2018  CAD (coronary artery disease) 2018  Epigastric abdominal pain 2018  S/P placement of cardiac pacemaker 2018  Stage 3 chronic kidney disease ([facility] [address] 2018  Acute on chronic systolic CHF (congestive heart failure) ([facility] [address] 2018  Hypokalemia 10/07/2018  Chronic atrial fibrillation ([facility] [address] 2016  
 HBP (high blood pressure) 2013 Subjective: Sameer Merlos reports Chest pain X none  consistent with:  Non-cardiac CP Atypical CP None now  On going  Anginal CP Dyspnea X none  at rest  with exertion    
    improved  unchanged  worse PND X none  overnight Orthopnea X none  improved  unchanged  worse Presyncope X none  improved  unchanged  worse Ambulated in hallway without symptoms   Yes Ambulated in room without symptoms  Yes Objective:  
 Physical Exam: 
Overall VSSAF;   
Visit Vitals /88 (BP 1 Location: Left arm, BP Patient Position: At rest) Pulse 80 Temp 97.6 °F (36.4 °C) Resp 19 Ht 5' 9\" (1.753 m) Wt 105.4 kg (232 lb 5 oz) SpO2 96% BMI 34.31 kg/m² Temp (24hrs), Av.6 °F (36.4 °C), Min:97.4 °F (36.3 °C), Max:98 °F (36.7 °C) Patient Vitals for the past 8 hrs: 
 Pulse 11/15/18 0728 80  
11/15/18 0338 80 Patient Vitals for the past 8 hrs: 
 Resp  
11/15/18 0728 19  
11/15/18 0338 18 Patient Vitals for the past 8 hrs: 
 BP  
11/15/18 0728 120/88  
11/15/18 0338 116/84 Intake/Output Summary (Last 24 hours) at 11/15/2018 8776 Last data filed at 11/15/2018 0977 Gross per 24 hour Intake 940.5 ml Output 1350 ml Net -409.5 ml  
 
General Appearance: Well developed, well nourished, no acute distress. Ears/Nose/Mouth/Throat:   Normal MM; anicteric. JVP: WNL Resp:   clear to auscultation bilaterally. Nl resp effort. Cardiovascular:  RRR, S1, S2 normal, no new murmur. No gallop or rub. Abdomen:   Soft, non-tender, bowel sounds are present. Extremities: Mild edema bilaterally. Skin: 
Neuro: Warm and dry. A/O x3, grossly nonfocal  
cath site intact w/o hematoma or new bruit; distal pulse unchanged  Yes Data Review:    
Telemetry independently reviewed  sinus x chronic afib x V pacing  NSVT  
 
ECG independently reviewed  NSR  afib  no significant changes  NSST-Tw chgs  
 no new ECG provided for review Lab results reviewed as noted below. Current medications reviewed as noted below. No results for input(s): PH, PCO2, PO2 in the last 72 hours. Recent Labs 11/13/18 0107 11/12/18 
1458 TROIQ <0.05 0.06* Recent Labs 11/15/18 
0048 11/14/18 0111 11/13/18 0107  144 145  
K 3.5 3.8 3.8  107 107 CO2 30 32 31 BUN 17 17 16 CREA 1.60* 1.42* 1.40* GFRAA 39* 45* 46* GLU 94 94 105* CA 9.3 8.6 8.0* ALB 3.3* 3.3* 3.0* WBC 6.0 5.8 6.1 HGB 11.8 11.4* 11.0*  
HCT 38.5 37.6 36.2  205 206 Lab Results Component Value Date/Time Cholesterol, total 104 08/01/2018 10:17 AM  
 HDL Cholesterol 33 (L) 08/01/2018 10:17 AM  
 LDL, calculated 49 08/01/2018 10:17 AM  
 Triglyceride 110 08/01/2018 10:17 AM  
 CHOL/HDL Ratio 4.7 04/03/2014 03:30 PM  
 
Recent Labs 11/15/18 
0048 11/14/18 
0111 11/13/18 0107 SGOT 16 16 16 * 141* 142* TP 6.3* 6.2* 6.3* ALB 3.3* 3.3* 3.0*  
GLOB 3.0 2.9 3.3 Recent Labs 11/15/18 
0048 11/14/18 
0111 11/13/18 0107 APTT 51.8* 63.6* 67.7* No components found for: [place] Current Facility-Administered Medications Medication Dose Route Frequency  potassium chloride 10 mEq in 100 ml IVPB  10 mEq IntraVENous ONCE  
 metroNIDAZOLE (FLAGYL) IVPB premix 500 mg  500 mg IntraVENous Q12H  
 sodium chloride (NS) flush 5-10 mL  5-10 mL IntraVENous Q8H  
  sodium chloride (NS) flush 5-10 mL  5-10 mL IntraVENous PRN  
 acetaminophen (TYLENOL) tablet 650 mg  650 mg Oral Q6H PRN  
 fentaNYL citrate (PF) injection 50 mcg  50 mcg IntraVENous Q6H PRN  
 levoFLOXacin (LEVAQUIN) 750 mg in D5W IVPB  750 mg IntraVENous Q48H  
 oxyCODONE IR (ROXICODONE) tablet 5 mg  5 mg Oral Q6H PRN  
 ondansetron (ZOFRAN) injection 4 mg  4 mg IntraVENous Q6H PRN  
 docusate sodium (COLACE) capsule 100 mg  100 mg Oral BID  atorvastatin (LIPITOR) tablet 40 mg  40 mg Oral QHS  clonazePAM (KlonoPIN) tablet 0.5 mg  0.5 mg Oral QHS  potassium chloride SR (KLOR-CON 10) tablet 20 mEq  20 mEq Oral DAILY  pregabalin (LYRICA) capsule 200 mg  200 mg Oral BID  venlafaxine-SR (EFFEXOR-XR) capsule 150 mg  150 mg Oral BID WITH MEALS  metoprolol succinate (TOPROL-XL) XL tablet 100 mg  100 mg Oral DAILY  bumetanide (BUMEX) injection 1 mg  1 mg IntraVENous BID  pantoprazole (PROTONIX) tablet 40 mg  40 mg Oral ACB  heparin (porcine) injection 4,000 Units  4,000 Units IntraVENous PRN  
 heparin (porcine) injection 2,000 Units  2,000 Units IntraVENous PRN  
 insulin lispro (HUMALOG) injection   SubCUTAneous AC&HS  
 glucose chewable tablet 16 g  4 Tab Oral PRN  
 dextrose (D50W) injection syrg 12.5-25 g  12.5-25 g IntraVENous PRN  
 glucagon (GLUCAGEN) injection 1 mg  1 mg IntraMUSCular PRN  
 labetalol (NORMODYNE;TRANDATE) injection 10 mg  10 mg IntraVENous Q2H PRN  
 lisinopril (PRINIVIL, ZESTRIL) tablet 10 mg  10 mg Oral DAILY  nitroglycerin (NITROBID) 2 % ointment 1 Inch  1 Inch Topical Q6H PRN  
 hydrALAZINE (APRESOLINE) 20 mg/mL injection 10 mg  10 mg IntraVENous Q4H PRN Lora Willams MD 
 
 breast and formula feeding

## 2018-11-15 NOTE — PROGRESS NOTES
Problem: Falls - Risk of 
Goal: *Absence of Falls Document Debria Check Fall Risk and appropriate interventions in the flowsheet. Outcome: Progressing Towards Goal 
Fall Risk Interventions: 
Mobility Interventions: Bed/chair exit alarm, Communicate number of staff needed for ambulation/transfer, Mechanical lift, OT consult for ADLs, Patient to call before getting OOB, PT Consult for mobility concerns Medication Interventions: Bed/chair exit alarm, Evaluate medications/consider consulting pharmacy, Patient to call before getting OOB, Teach patient to arise slowly Elimination Interventions: Bed/chair exit alarm, Call light in reach, Elevated toilet seat, Patient to call for help with toileting needs, Toilet paper/wipes in reach, Toileting schedule/hourly rounds History of Falls Interventions: Bed/chair exit alarm, Consult care management for discharge planning, Door open when patient unattended, Evaluate medications/consider consulting pharmacy, Investigate reason for fall, Room close to nurse's station

## 2018-11-15 NOTE — PROGRESS NOTES
TRANSFER - IN REPORT: 
 
Verbal report received from [place], RN (name) on Cecy Diaz  being received from PACU (unit) for routine post - op Report consisted of patients Situation, Background, Assessment and  
Recommendations(SBAR). Information from the following report(s) SBAR was reviewed with the receiving nurse. Opportunity for questions and clarification was provided. Assessment completed upon patients arrival to unit and care assumed. Dat 16 - Notified Dr. Ingrid Wheeler that patient is back on [address] Asked whether pt can be started back on heparin drip or pradaxa. Given orders to re-start pradaxa at prior outpatient dose.

## 2018-11-15 NOTE — OP NOTES
OPERATION        DATE OF OPERATION:  11/15/2018    PREOPERATIVE DIAGNOSIS:  Cholecystitis with cholelithiasis    POSTOPERATIVE DIAGNOSIS:  same as preop    PROCEDURE:   Laparoscopic Cholecystectomy    SURGEON:  Michela Holm MD, FACS. ANESTHESIA:  General Endotracheal Anesthesia    FINDINGS:  Cholecystitis with cholelithiasis    SPECIMENS REMOVED:  Gallbladder and Contents    DESCRIPTION OF OPERATION:  After appropriate consent was obtained, patient who was competent was brought to the operating room, made comfortable in a supine position, and administered general endotracheal anesthesia. The patient was prepped and draped in standard fashion. A 0.5% plain Marcaine solution was used to infiltrate the skin at the trocar sites. Fifteen blade was then used to incise of the umbilicus. This was extended sharply down through the midline fascia. A Shields trocar was placed and the abdominal cavity was insufflated with CO2 gas to 15 cm water pressure. Under direct visualization, two 5 mm trocars were placed in the upper abdomen. Using standard laparoscopic technique, the abdominal cavity was explored. The gallbladder was identified and grasped at the fundus and elevated over the inferior edge of the liver. The neck of the gallbladder was retracted laterally. The peritoneum was stripped down over Calot's triangle and the cystic duct and cystic artery were identified. These structures were freed up proximally and distally, ligated with clips, and divided between clips. The gallbladder was then reflected off the gallbladder fossa with the Bovie hook electrocautery and once it was free, it was placed in the endoscopic retrieval bag and withdrawn from the abdomen through the umbilical trocar site. The gallbladder fossa was inspected. Hemostasis was confirmed. The trocars were removed under direct visualization with no evidence of bleeding. CO2 gas was fully desufflated from the abdominal cavity.  The umbilical fascia defect was approximated with 0 Vicryl suture. Skin incisions were closed with 5-0 Monocryl subcuticular stitch. The wounds were cleaned and dried and dressed with Dermabond and the patient was awakened and extubated and transferred to the recovery room in good condition. There were no complications and minimal blood loss during the case. Eddie Wu.  Veronica Cancino MD, [facility]

## 2018-11-15 NOTE — PROGRESS NOTES
Bedside shift change report given to Antonio Khan RN (oncoming nurse). Report included the following information SBAR, Kardex, Intake/Output, MAR and Recent Results. SHIFT SUMMARY: 
 
 
 
 
 
[address] NURSING NOTE Admission Date 11/12/2018 Admission Diagnosis Acute on chronic systolic CHF (congestive heart failure) ([facility] [address] Hypokalemia Consults IP CONSULT TO GENERAL SURGERY 
IP CONSULT TO CARDIOLOGY Cardiac Monitoring [x] Yes [] No  
  
Purposeful Hourly Rounding [x] Yes   
Ananya Score Total Score: 4 Ananya score 3 or > [x] Bed Alarm [] Avasys [] 1:1 sitter [] Patient refused (Signed refusal form in chart) Preet Score Preet Score: 21 Preet score 14 or < [] PMT consult [] Wound Care consult  
 []  Specialty bed  [] Nutrition consult Influenza Vaccine Received Flu Vaccine for Current Season (usually Sept-March): Yes Oxygen needs? [x] Room air Oxygen @  []1L    []2L    []3L   []4L    []5L   []6L via NC Chronic home O2 use? [] Yes [x] No 
Perform O2 challenge test and document in progress note using smart[employer] (.Homeoxygen) Last bowel movement Urinary Catheter LDAs Peripheral IV 11/12/18 Left Hand (Active) Site Assessment Clean, dry, & intact 11/14/2018  7:14 PM  
Phlebitis Assessment 0 11/14/2018  7:14 PM  
Infiltration Assessment 0 11/14/2018  7:14 PM  
Dressing Status Clean, dry, & intact 11/14/2018  7:14 PM  
Dressing Type Transparent 11/14/2018  7:14 PM  
Hub Color/Line Status Blue; Infusing 11/14/2018  7:14 PM  
   
Peripheral IV 11/12/18 Right Antecubital (Active) Site Assessment Clean, dry, & intact 11/14/2018  7:14 PM  
Phlebitis Assessment 0 11/14/2018  7:14 PM  
Infiltration Assessment 0 11/14/2018  7:14 PM  
Dressing Status Clean, dry, & intact 11/14/2018  7:14 PM  
Dressing Type Transparent 11/14/2018  7:14 PM  
Hub Color/Line Status Pink;Capped 11/14/2018  7:14 PM  
                  
  
 Readmission Risk Assessment Tool Score High Risk   
      
 30 Total Score 3 Has Seen PCP in Last 6 Months (Yes=3, No=0)  
 4 IP Visits Last 12 Months (1-3=4, 4=9, >4=11) 5 Pt. Coverage (Medicare=5 , Medicaid, or Self-Pay=4) 18 Charlson Comorbidity Score (Age + Comorbid Conditions) Criteria that do not apply:  
 . Living with Significant Other. Assisted Living. LTAC. SNF. or  
Rehab Patient Length of Stay (>5 days = 3) Expected Length of Stay 4d 2h Actual Length of Stay 2

## 2018-11-15 NOTE — PERIOP NOTES
1355: Updated patients  in patients room 1430: TRANSFER - OUT REPORT: 
 
Verbal report given to [employer]  (name) on Tari Stall  being transferred to [facility]  (unit) for routine post - op Report consisted of patients Situation, Background, Assessment and  
Recommendations(SBAR). Information from the following report(s) SBAR, Kardex, Intake/Output and MAR was reviewed with the receiving nurse. Lines:  
Peripheral IV 11/12/18 Right Antecubital (Active) Site Assessment Clean, dry, & intact 11/15/2018  2:46 PM  
Phlebitis Assessment 0 11/15/2018  2:46 PM  
Infiltration Assessment 0 11/15/2018  2:46 PM  
Dressing Status Clean, dry, & intact 11/15/2018  2:46 PM  
Dressing Type Transparent;Tape 11/15/2018  2:46 PM  
Hub Color/Line Status Pink;Flushed 11/15/2018  2:46 PM  
  
 
Opportunity for questions and clarification was provided. Patient transported with: 
 Monitor O2 @ 2 liters Registered Nurse Tech

## 2018-11-15 NOTE — PROGRESS NOTES
Problem: Falls - Risk of 
Goal: *Absence of Falls Document Geri LangfordAmor Fall Risk and appropriate interventions in the flowsheet. Outcome: Progressing Towards Goal 
Fall Risk Interventions: 
Mobility Interventions: Bed/chair exit alarm, Communicate number of staff needed for ambulation/transfer, Mechanical lift, OT consult for ADLs, Patient to call before getting OOB, PT Consult for mobility concerns Medication Interventions: Bed/chair exit alarm, Evaluate medications/consider consulting pharmacy, Patient to call before getting OOB, Teach patient to arise slowly Elimination Interventions: Bed/chair exit alarm, Call light in reach, Elevated toilet seat, Patient to call for help with toileting needs, Toilet paper/wipes in reach, Toileting schedule/hourly rounds History of Falls Interventions: Bed/chair exit alarm, Consult care management for discharge planning, Door open when patient unattended, Evaluate medications/consider consulting pharmacy, Investigate reason for fall, Room close to nurse's station

## 2018-11-15 NOTE — PERIOP NOTES
Handoff Report from Operating Room to PACU Report received from [address] and A Laura RN regarding Anamika Peterson. Surgeon(s): 
Freddy Lemus MD  And Procedure(s) (LRB): 
LAPAROSCOPIC CHOLECYSTECTOMY (N/A)  confirmed  
with allergies and dressings discussed. Anesthesia type, drugs, patient history, complications, estimated blood loss, vital signs, intake and output, and last pain medication, lines and temperature were reviewed.

## 2018-11-15 NOTE — PERIOP NOTES
TRANSFER - IN REPORT: 
 
Verbal report received from [address] [place] [address] on Nina David  being received from [phone] for ordered procedure Report consisted of patients Situation, Background, Assessment and  
Recommendations(SBAR). Information from the following report(s) SBAR, ED Summary, Procedure Summary, Intake/Output and MAR was reviewed with the receiving nurse. Opportunity for questions and clarification was provided. Assessment completed upon patients arrival to unit and care assumed.

## 2018-11-15 NOTE — ANESTHESIA POSTPROCEDURE EVALUATION
Procedure(s): LAPAROSCOPIC CHOLECYSTECTOMY. Anesthesia Post Evaluation Patient location during evaluation: PACU Note status: Adequate. Level of consciousness: responsive to verbal stimuli and sleepy but conscious Pain management: satisfactory to patient Airway patency: patent Anesthetic complications: no 
Cardiovascular status: acceptable Respiratory status: acceptable Hydration status: acceptable Comments: +Post-Anesthesia Evaluation and Assessment Patient: Danie Kaye MRN: [id number]  SSN: [id number] YOB: 1950  Age: 79 y.o. Sex: female Cardiovascular Function/Vital Signs /83 (BP 1 Location: Left arm, BP Patient Position: At rest)   Pulse 82   Temp 36.4 °C (97.5 °F)   Resp 12   Ht 5' 9\" (1.753 m)   Wt 105.4 kg (232 lb 5 oz)   SpO2 98%   BMI 34.31 kg/m² Patient is status post Procedure(s): LAPAROSCOPIC CHOLECYSTECTOMY. Nausea/Vomiting: Controlled. Postoperative hydration reviewed and adequate. Pain: 
Pain Scale 1: Numeric (0 - 10) (11/15/18 1415) Pain Intensity 1: 4 (11/15/18 1415) Managed. Neurological Status:  
Neuro (WDL): Exceptions to WDL (11/15/18 1345) At baseline. Mental Status and Level of Consciousness: Arousable. Pulmonary Status:  
O2 Device: Nasal cannula (11/15/18 1415) Adequate oxygenation and airway patent. Complications related to anesthesia: None Post-anesthesia assessment completed. No concerns. Signed By: Kwabena Longoria MD  
 11/15/2018 Post anesthesia nausea and vomiting:  controlled Visit Vitals /83 (BP 1 Location: Left arm, BP Patient Position: At rest) Pulse 82 Temp 36.4 °C (97.5 °F) Resp 12 Ht 5' 9\" (1.753 m) Wt 105.4 kg (232 lb 5 oz) SpO2 98% BMI 34.31 kg/m²

## 2018-11-15 NOTE — PROGRESS NOTES
Bedside shift change report given to Rob Waldron RN (oncoming nurse) by ST IMELDA RN (offgoing nurse). Report included the following information SBAR.  
 
 
[id number] TRANSFER - OUT REPORT: 
 
Verbal report given to Hansa Robledo RN (name) on Joey Collins  being transferred to pre-op holding (unit) for ordered procedure Report consisted of patients Situation, Background, Assessment and  
Recommendations(SBAR). Information from the following report(s) SBAR was reviewed with the receiving nurse. Lines:  
Peripheral IV 11/12/18 Left Hand (Active) Site Assessment Clean, dry, & intact 11/15/2018  7:28 AM  
Phlebitis Assessment 0 11/15/2018  7:28 AM  
Infiltration Assessment 0 11/15/2018  7:28 AM  
Dressing Status Clean, dry, & intact 11/15/2018  7:28 AM  
Dressing Type Transparent;Tape 11/15/2018  7:28 AM  
Hub Color/Line Status Blue;Flushed 11/15/2018  7:28 AM  
   
Peripheral IV 11/12/18 Right Antecubital (Active) Site Assessment Clean, dry, & intact 11/15/2018  7:28 AM  
Phlebitis Assessment 0 11/15/2018  7:28 AM  
Infiltration Assessment 0 11/15/2018  7:28 AM  
Dressing Status Clean, dry, & intact 11/15/2018  7:28 AM  
Dressing Type Transparent;Tape 11/15/2018  7:28 AM  
Hub Color/Line Status Pink; Infusing 11/15/2018  7:28 AM  
  
 
Opportunity for questions and clarification was provided.    
 
Patient transported with: 
 Monitor, RN

## 2018-11-16 PROBLEM — K81.1 CHRONIC CHOLECYSTITIS: Status: ACTIVE | Noted: 2018-11-16

## 2018-11-16 LAB
ALBUMIN SERPL-MCNC: 3.2 G/DL (ref 3.5–5)
ALBUMIN/GLOB SERPL: 1.1 {RATIO} (ref 1.1–2.2)
ALP SERPL-CCNC: 121 U/L (ref 45–117)
ALT SERPL-CCNC: 23 U/L (ref 12–78)
ANION GAP SERPL CALC-SCNC: 7 MMOL/L (ref 5–15)
AST SERPL-CCNC: 30 U/L (ref 15–37)
BILIRUB SERPL-MCNC: 0.8 MG/DL (ref 0.2–1)
BUN SERPL-MCNC: 16 MG/DL (ref 6–20)
BUN/CREAT SERPL: 9 (ref 12–20)
CALCIUM SERPL-MCNC: 9 MG/DL (ref 8.5–10.1)
CHLORIDE SERPL-SCNC: 107 MMOL/L (ref 97–108)
CO2 SERPL-SCNC: 29 MMOL/L (ref 21–32)
CREAT SERPL-MCNC: 1.7 MG/DL (ref 0.55–1.02)
ERYTHROCYTE [DISTWIDTH] IN BLOOD BY AUTOMATED COUNT: 18.1 % (ref 11.5–14.5)
GLOBULIN SER CALC-MCNC: 3 G/DL (ref 2–4)
GLUCOSE BLD STRIP.AUTO-MCNC: 106 MG/DL (ref 65–100)
GLUCOSE BLD STRIP.AUTO-MCNC: 108 MG/DL (ref 65–100)
GLUCOSE BLD STRIP.AUTO-MCNC: 112 MG/DL (ref 65–100)
GLUCOSE BLD STRIP.AUTO-MCNC: 97 MG/DL (ref 65–100)
GLUCOSE SERPL-MCNC: 91 MG/DL (ref 65–100)
HCT VFR BLD AUTO: 38.9 % (ref 35–47)
HGB BLD-MCNC: 11.6 G/DL (ref 11.5–16)
MCH RBC QN AUTO: 28.4 PG (ref 26–34)
MCHC RBC AUTO-ENTMCNC: 29.8 G/DL (ref 30–36.5)
MCV RBC AUTO: 95.3 FL (ref 80–99)
NRBC # BLD: 0 K/UL (ref 0–0.01)
NRBC BLD-RTO: 0 PER 100 WBC
PLATELET # BLD AUTO: 209 K/UL (ref 150–400)
PMV BLD AUTO: 12 FL (ref 8.9–12.9)
POTASSIUM SERPL-SCNC: 3.9 MMOL/L (ref 3.5–5.1)
PROT SERPL-MCNC: 6.2 G/DL (ref 6.4–8.2)
RBC # BLD AUTO: 4.08 M/UL (ref 3.8–5.2)
SERVICE CMNT-IMP: ABNORMAL
SERVICE CMNT-IMP: NORMAL
SODIUM SERPL-SCNC: 143 MMOL/L (ref 136–145)
WBC # BLD AUTO: 7.4 K/UL (ref 3.6–11)

## 2018-11-16 PROCEDURE — 85027 COMPLETE CBC AUTOMATED: CPT

## 2018-11-16 PROCEDURE — 65660000000 HC RM CCU STEPDOWN

## 2018-11-16 PROCEDURE — 97116 GAIT TRAINING THERAPY: CPT

## 2018-11-16 PROCEDURE — 74011250637 HC RX REV CODE- 250/637: Performed by: INTERNAL MEDICINE

## 2018-11-16 PROCEDURE — 82962 GLUCOSE BLOOD TEST: CPT

## 2018-11-16 PROCEDURE — 94760 N-INVAS EAR/PLS OXIMETRY 1: CPT

## 2018-11-16 PROCEDURE — 74011250637 HC RX REV CODE- 250/637: Performed by: SURGERY

## 2018-11-16 PROCEDURE — 80053 COMPREHEN METABOLIC PANEL: CPT

## 2018-11-16 PROCEDURE — 36415 COLL VENOUS BLD VENIPUNCTURE: CPT

## 2018-11-16 PROCEDURE — 74011250637 HC RX REV CODE- 250/637: Performed by: HOSPITALIST

## 2018-11-16 PROCEDURE — 97530 THERAPEUTIC ACTIVITIES: CPT

## 2018-11-16 RX ORDER — BUMETANIDE 1 MG/1
2 TABLET ORAL DAILY
Status: DISCONTINUED | OUTPATIENT
Start: 2018-11-16 | End: 2018-11-18 | Stop reason: HOSPADM

## 2018-11-16 RX ADMIN — BUMETANIDE 2 MG: 1 TABLET ORAL at 09:49

## 2018-11-16 RX ADMIN — Medication 10 ML: at 14:35

## 2018-11-16 RX ADMIN — CLONAZEPAM 0.5 MG: 0.5 TABLET ORAL at 20:46

## 2018-11-16 RX ADMIN — ACETAMINOPHEN 650 MG: 325 TABLET ORAL at 03:14

## 2018-11-16 RX ADMIN — ACETAMINOPHEN 650 MG: 325 TABLET ORAL at 14:34

## 2018-11-16 RX ADMIN — DABIGATRAN ETEXILATE MESYLATE 150 MG: 150 CAPSULE ORAL at 18:58

## 2018-11-16 RX ADMIN — VENLAFAXINE HYDROCHLORIDE 150 MG: 150 CAPSULE, EXTENDED RELEASE ORAL at 17:26

## 2018-11-16 RX ADMIN — PREGABALIN 200 MG: 150 CAPSULE ORAL at 17:26

## 2018-11-16 RX ADMIN — ATORVASTATIN CALCIUM 40 MG: 40 TABLET, FILM COATED ORAL at 20:46

## 2018-11-16 RX ADMIN — DOCUSATE SODIUM 100 MG: 100 CAPSULE, LIQUID FILLED ORAL at 09:45

## 2018-11-16 RX ADMIN — PANTOPRAZOLE SODIUM 40 MG: 40 TABLET, DELAYED RELEASE ORAL at 09:48

## 2018-11-16 RX ADMIN — METOPROLOL SUCCINATE 100 MG: 50 TABLET, EXTENDED RELEASE ORAL at 09:48

## 2018-11-16 RX ADMIN — DABIGATRAN ETEXILATE MESYLATE 150 MG: 150 CAPSULE ORAL at 09:42

## 2018-11-16 RX ADMIN — DOCUSATE SODIUM 100 MG: 100 CAPSULE, LIQUID FILLED ORAL at 17:26

## 2018-11-16 RX ADMIN — PREGABALIN 200 MG: 150 CAPSULE ORAL at 09:45

## 2018-11-16 RX ADMIN — VENLAFAXINE HYDROCHLORIDE 150 MG: 150 CAPSULE, EXTENDED RELEASE ORAL at 09:44

## 2018-11-16 RX ADMIN — Medication 10 ML: at 20:47

## 2018-11-16 RX ADMIN — LISINOPRIL 10 MG: 5 TABLET ORAL at 09:48

## 2018-11-16 RX ADMIN — Medication 10 ML: at 06:34

## 2018-11-16 RX ADMIN — POTASSIUM CHLORIDE 20 MEQ: 750 TABLET, FILM COATED, EXTENDED RELEASE ORAL at 09:43

## 2018-11-16 NOTE — PROGRESS NOTES
Bedside shift change report given to Tenzin Flanagan RN (oncoming nurse) by Juanito Hutchinson RN (offgoing nurse). Report included the following information SBAR.

## 2018-11-16 NOTE — PROGRESS NOTES
Admit Date: 2018 POD 1 Day Post-Op Procedure:  Procedure(s): LAPAROSCOPIC CHOLECYSTECTOMY Subjective:  
 
Patient has no complaints. Objective:  
 
Blood pressure 137/83, pulse 80, temperature 97.5 °F (36.4 °C), resp. rate 16, height 5' 9\" (1.753 m), weight 231 lb 1.6 oz (104.8 kg), SpO2 95 %. Temp (24hrs), Av.5 °F (36.4 °C), Min:96.6 °F (35.9 °C), Max:98.1 °F (36.7 °C) Physical Exam:  GENERAL: alert, cooperative, no distress, appears stated age, LUNG: clear to auscultation bilaterally, HEART: regular rate and rhythm, ABDOMEN: soft, non-tender. Bowel sounds normal. No masses,  no organomegaly, wounds c/d/i, EXTREMITIES:  extremities normal, atraumatic, no cyanosis or edema Labs:  
Recent Results (from the past 24 hour(s)) GLUCOSE, POC Collection Time: 11/15/18 12:03 PM  
Result Value Ref Range Glucose (POC) 94 65 - 100 mg/dL Performed by Rosemarie Carvajal GLUCOSE, POC Collection Time: 11/15/18  1:50 PM  
Result Value Ref Range Glucose (POC) 88 65 - 100 mg/dL Performed by Melissa Escudero   
GLUCOSE, POC Collection Time: 11/15/18  4:27 PM  
Result Value Ref Range Glucose (POC) 97 65 - 100 mg/dL Performed by Ruba Bailey (PCT) GLUCOSE, POC Collection Time: 11/15/18  8:51 PM  
Result Value Ref Range Glucose (POC) 115 (H) 65 - 100 mg/dL Performed by Hao Higgins (CON) PCT   
CBC W/O DIFF Collection Time: 18  3:05 AM  
Result Value Ref Range WBC 7.4 3.6 - 11.0 K/uL  
 RBC 4.08 3.80 - 5.20 M/uL  
 HGB 11.6 11.5 - 16.0 g/dL HCT 38.9 35.0 - 47.0 % MCV 95.3 80.0 - 99.0 FL  
 MCH 28.4 26.0 - 34.0 PG  
 MCHC 29.8 (L) 30.0 - 36.5 g/dL  
 RDW 18.1 (H) 11.5 - 14.5 % PLATELET 949 862 - 337 K/uL MPV 12.0 8.9 - 12.9 FL  
 NRBC 0.0 0  WBC ABSOLUTE NRBC 0.00 0.00 - 0.01 K/uL METABOLIC PANEL, COMPREHENSIVE Collection Time: 18  3:05 AM  
Result Value Ref Range  Sodium 143 136 - 145 mmol/L  
 Potassium 3.9 3.5 - 5.1 mmol/L Chloride 107 97 - 108 mmol/L  
 CO2 29 21 - 32 mmol/L Anion gap 7 5 - 15 mmol/L Glucose 91 65 - 100 mg/dL BUN 16 6 - 20 MG/DL Creatinine 1.70 (H) 0.55 - 1.02 MG/DL  
 BUN/Creatinine ratio 9 (L) 12 - 20 GFR est AA 36 (L) >60 ml/min/1.73m2 GFR est non-AA 30 (L) >60 ml/min/1.73m2 Calcium 9.0 8.5 - 10.1 MG/DL Bilirubin, total 0.8 0.2 - 1.0 MG/DL  
 ALT (SGPT) 23 12 - 78 U/L  
 AST (SGOT) 30 15 - 37 U/L Alk. phosphatase 121 (H) 45 - 117 U/L Protein, total 6.2 (L) 6.4 - 8.2 g/dL Albumin 3.2 (L) 3.5 - 5.0 g/dL Globulin 3.0 2.0 - 4.0 g/dL A-G Ratio 1.1 1.1 - 2.2 GLUCOSE, POC Collection Time: 11/16/18  7:54 AM  
Result Value Ref Range Glucose (POC) 97 65 - 100 mg/dL Performed by [facility] Data Review images and reports reviewed Assessment:  
 
Principal Problem: 
  Acute on chronic systolic CHF (congestive heart failure) ([facility] [address] (11/12/2018) Active Problems: 
  HBP (high blood pressure) (2/22/2013) Chronic atrial fibrillation ([address] (6/5/2016) Hypokalemia (10/7/2018) Ischemic cardiomyopathy (11/13/2018) CAD (coronary artery disease) (11/13/2018) Epigastric abdominal pain (11/13/2018) S/P placement of cardiac pacemaker (11/13/2018) Stage 3 chronic kidney disease ([address] (11/13/2018) Chronic cholecystitis (11/16/2018) Plan/Recommendations/Medical Decision Making:  
 
Continue present treatment GI lite diet Doing well s/p lap cholecystectomy Complex pt with significant cardiac disease Suitable for d/c at any time from surgical standpoint. F/u with me in 2 weeks. Orene Severs. Darcie Hicks MD, [address] [facility]

## 2018-11-16 NOTE — PROGRESS NOTES
Problem: Mobility Impaired (Adult and Pediatric) Goal: *Acute Goals and Plan of Care (Insert Text) Physical Therapy Goals Initiated 11/13/2018 1. Patient will move from supine to sit and sit to supine  in bed with modified independence within 7 day(s). 2.  Patient will transfer from bed to chair and chair to bed with modified independence using the least restrictive device within 7 day(s). 3.  Patient will perform sit to stand with modified independence within 7 day(s). 4.  Patient will ambulate with modified independence for 200 feet with the least restrictive device within 7 day(s). 5.  Patient will ascend/descend 2 stairs with 1 handrail(s) with modified independence within 7 day(s). physical Therapy TREATMENT/DISCHARGE Patient: Demarcus Altman (43 y.o. female) Date: 11/16/2018 Diagnosis: Acute on chronic systolic CHF (congestive heart failure) ([address] Hypokalemia Acute on chronic systolic CHF (congestive heart failure) ([facility] [address] Procedure(s) (LRB): 
LAPAROSCOPIC CHOLECYSTECTOMY (N/A) 1 Day Post-Op Precautions: Fall Chart, physical therapy assessment, plan of care and goals were reviewed. ASSESSMENT: 
Patient received sitting in the chair, agreeable to PT. Patient is independent with sit to stand with SPC. Patient ambulated 800 feet with supervision-independently with safe and steady gait. Patient required one seated rest break with ambulation with VSS. Patient left sitting in the chair at the conclusion of PT treatment session. Patient reports she feels at her baseline with use of SPC and PT services are not indicated. Will sign off. Encouraged patient to ambulate with nursing staff and could likely be up ad tessie in the room although up to nursing staff. Bed alarm activated. Progression toward goals: 
[x]      Improving appropriately and progressing toward goals 
[]      Improving slowly and progressing toward goals 
[]      Not making progress toward goals and plan of care will be adjusted PLAN: 
Patient will be discharged from physical therapy at this time. Rationale for discharge: 
[x] Goals Achieved 
[] [address] 
[] Patient not participating in therapy 
[] Other: 
Discharge Recommendations:  None Further Equipment Recommendations for Discharge: Owns DME SUBJECTIVE:  
Patient stated I feel better.  OBJECTIVE DATA SUMMARY:  
Critical Behavior: 
Neurologic State: Alert, Appropriate for age Orientation Level: Oriented X4 Cognition: Appropriate decision making, Appropriate for age attention/concentration, Appropriate safety awareness Safety/Judgement: Awareness of environment, Insight into deficits Functional Mobility Training: 
Bed Mobility: 
  
  
  
  
  
  
Transfers: 
Sit to Stand: Independent Stand to Sit: Independent Bed to Chair: Independent Balance: 
Sitting: Intact; Without support Standing: Intact; Without supportAmbulation/Gait Training: 
Distance (ft): 800 Feet (ft) Assistive Device: Gait belt(one seated rested break) Ambulation - Level of Assistance: Independent Gait Abnormalities: Decreased step clearance Base of Support: Widened Speed/Brielle: Pace decreased (<100 feet/min) Step Length: Right shortened;Left shortened Pain: 
Pain Scale 1: Numeric (0 - 10) Pain Intensity 1: 0(mild discomfort ) Activity Tolerance:  
Good, at baseline. Please refer to the flowsheet for vital signs taken during this treatment. After treatment:  
[x] Patient left in no apparent distress sitting up in chair 
[] Patient left in no apparent distress in bed 
[x] Call bell left within reach [x] Nursing notified 
[x] Caregiver present [x] Bed alarm activated COMMUNICATION/COLLABORATION:  
The patients plan of care was discussed with: Registered Nurse and  Rani Jones, PT, DPT Time Calculation: 29 mins

## 2018-11-16 NOTE — PROGRESS NOTES
Bedside shift change report given to Julio Leon, RN (oncoming nurse). Report included the following information SBAR. SHIFT SUMMARY: 
 
 
 
 
 
[address] NURSING NOTE Admission Date 11/12/2018 Admission Diagnosis Acute on chronic systolic CHF (congestive heart failure) ([facility] [address] Hypokalemia Consults IP CONSULT TO GENERAL SURGERY 
IP CONSULT TO CARDIOLOGY Cardiac Monitoring [x] Yes [] No  
  
Purposeful Hourly Rounding [x] Yes   
Ananya Score Total Score: 2 Ananya score 3 or > [x] Bed Alarm [] Avasys [] 1:1 sitter [] Patient refused (Signed refusal form in chart) Preet Score Preet Score: 20 Preet score 14 or < [] PMT consult [] Wound Care consult  
 []  Specialty bed  [] Nutrition consult Influenza Vaccine Received Flu Vaccine for Current Season (usually Sept-March): Yes Oxygen needs? [x] Room air Oxygen @  []1L    []2L    []3L   []4L    []5L   []6L via NC Chronic home O2 use? [] Yes [] No 
Perform O2 challenge test and document in progress note using smartphrase (.Homeoxygen) Last bowel movement Last Bowel Movement Date: 11/10/18 Urinary Catheter LDAs Peripheral IV 11/12/18 Right Antecubital (Active) Site Assessment Clean, dry, & intact 11/15/2018  2:46 PM  
Phlebitis Assessment 0 11/15/2018  2:46 PM  
Infiltration Assessment 0 11/15/2018  2:46 PM  
Dressing Status Clean, dry, & intact 11/15/2018  2:46 PM  
Dressing Type Transparent;Tape 11/15/2018  2:46 PM  
Hub Color/Line Status Pink;Flushed 11/15/2018  2:46 PM  
                  
  
Readmission Risk Assessment Tool Score High Risk   
      
 30 Total Score 3 Has Seen PCP in Last 6 Months (Yes=3, No=0)  
 4 IP Visits Last 12 Months (1-3=4, 4=9, >4=11) 5 Pt. Coverage (Medicare=5 , Medicaid, or Self-Pay=4) 18 Charlson Comorbidity Score (Age + Comorbid Conditions) Criteria that do not apply:  
 . Living with Significant Other. Assisted Living. LTAC. SNF. or  
Rehab Patient Length of Stay (>5 days = 3) Expected Length of Stay 5d 9h  
Actual Length of Stay 3

## 2018-11-16 NOTE — PROGRESS NOTES
[address] 
[phone] Medical Progress Note NAME: Bhavik Jameson :  1950 MRM:  [id number] Date/Time: 2018  5:54 AM 
  
Subjective:  
 
78 y/o female admitted with CAD, mixed ischemic/tachycardia cardiomyopathy, chronic afib - s/p av node ablation and pacemaker placement admitted with acute on chronic systolic CHF with 11 lb weight gain and elevated BNP since last discharge and epigastric abdominal pain. Started on O2 and IV diuresis with improvement in breathing. Seen by surgery and placed on clear liquids along with IV levaquin and flagyl for possible cholecysititis. Has been seen by cardiology and med adjustment made. HIDA scan positive for chronic cholecystitis Patient is alert and is in NAD. Feels better and is sitting in chair without SOB. . Afebrile with WBC 7400. K+ 3.9 and creatinine is 1.70. Tolerated lap becky yesterday. Past Medical History:  
Diagnosis Date  Anxiety 2018  Arthritis OA  Asthma  Diabetes ([address]  Essential hypertension  GERD (gastroesophageal reflux disease)  Hypercholesterolemia 2018  Hypertension  Ill-defined condition   
 diverticulitis  Long-term use of high-risk medication 2018  Neuropathy  Other ill-defined conditions(799.89) IBS, spinal stenosis  Plantar fasciitis  Psychiatric disorder   
 depression, anxiety  Sleep apnea   
 uses CPAP  Type 2 diabetes mellitus with diabetic neuropathy, without long-term current use of insulin ([address] 2016 ROS:  General: negative for fever, chills, sweats, weakness Respiratory:  positive for less SOB with activity Cardiology:  negative for chest pain, palpitations, orthopnea, PND, edema, syncope Gastrointestinal: positive for improvement in epigastric abdominal pain without N/V Genitourinary: negative for frequency, urgency, dysuria, hematuria, incontinence Muskuloskeletal : negative for arthralgia, myalgia Dermatological: negative for rash, ulceration, mole change, new lesion Neurological: negative for headache, dizziness, confusion, focal weakness, paresthesia, memory loss, gait disturbance Psychological: negative for anxiety, depression, agitation Review of systems otherwise negative Objective:  
 
 
Vitals:  
 
  
Last 24hrs VS reviewed since prior progress note. Most recent are: 
 
Visit Vitals /83 (BP 1 Location: Left arm, BP Patient Position: At rest) Pulse 80 Temp 97.5 °F (36.4 °C) Resp 16 Ht 5' 9\" (1.753 m) Wt 231 lb 1.6 oz (104.8 kg) SpO2 95% BMI 34.13 kg/m² SpO2 Readings from Last 6 Encounters:  
11/16/18 95% 10/22/18 99% 09/10/18 96% 08/07/18 94% 08/01/18 96% 03/14/18 96% O2 Flow Rate (L/min): 2 l/min Intake/Output Summary (Last 24 hours) at 11/16/2018 0554 Last data filed at 11/16/2018 5263 Gross per 24 hour Intake 700 ml Output 1905 ml Net -1205 ml Telemetry reviewed:   normal sinus rhythm, Paced Tubes:   N/A 
X-Ray:  Chest xray - Small right pleural effusion suspected. No other evidence of acute finding CT head - no acute abnormality U/S abdomen - Cholelithiasis with mild gallbladder wall thickening and tenderness 
in the region of the gallbladder concerning for acute cholecystitis. Small right 
pleural effusion. Cortical thinning right kidney compatible with medical renal 
Disease. NM V/Q scan - Very low probability for pulmonary embolism NM hepatobiliary scan -There is delayed activity identified within the gallbladder 
consistent with chronic cholecystitis Procedures:  Laparoscopic cholecystectomy Exam:  
 
General   well developed, well nourished, appears stated age, in no acute distress Neck   Supple without nodes or mass. No thyromegaly or bruit Respiratory   Much clearer with no rales heard Cardiology  Regular Rate and Rythmn  - no murmurs, rubs or gallops Abdominal  Soft, non-tender, non-distended, positive bowel sounds, no hepatosplenomegaly Extremities  Trace edema present Skin  Normal skin turgor. No rashes or skin ulcers noted Neurological  No focal neurological deficits noted Psychological  Oriented x 3. Normal affect. Exam otherwise negative Lab Data Reviewed: (see below) Medications Reviewed: (see below) 
 
______________________________________________________________________ Medications:  
 
Current Facility-Administered Medications Medication Dose Route Frequency  dabigatran etexilate (PRADAXA) capsule 150 mg  150 mg Oral BID  sodium chloride (NS) flush 5-10 mL  5-10 mL IntraVENous Q8H  
 sodium chloride (NS) flush 5-10 mL  5-10 mL IntraVENous PRN  
 acetaminophen (TYLENOL) tablet 650 mg  650 mg Oral Q6H PRN  
 fentaNYL citrate (PF) injection 50 mcg  50 mcg IntraVENous Q6H PRN  
 oxyCODONE IR (ROXICODONE) tablet 5 mg  5 mg Oral Q6H PRN  
 ondansetron (ZOFRAN) injection 4 mg  4 mg IntraVENous Q6H PRN  
 docusate sodium (COLACE) capsule 100 mg  100 mg Oral BID  atorvastatin (LIPITOR) tablet 40 mg  40 mg Oral QHS  clonazePAM (KlonoPIN) tablet 0.5 mg  0.5 mg Oral QHS  potassium chloride SR (KLOR-CON 10) tablet 20 mEq  20 mEq Oral DAILY  pregabalin (LYRICA) capsule 200 mg  200 mg Oral BID  venlafaxine-SR (EFFEXOR-XR) capsule 150 mg  150 mg Oral BID WITH MEALS  metoprolol succinate (TOPROL-XL) XL tablet 100 mg  100 mg Oral DAILY  bumetanide (BUMEX) injection 1 mg  1 mg IntraVENous BID  pantoprazole (PROTONIX) tablet 40 mg  40 mg Oral ACB  insulin lispro (HUMALOG) injection   SubCUTAneous AC&HS  
 glucose chewable tablet 16 g  4 Tab Oral PRN  
 dextrose (D50W) injection syrg 12.5-25 g  12.5-25 g IntraVENous PRN  
 glucagon (GLUCAGEN) injection 1 mg  1 mg IntraMUSCular PRN  
  labetalol (NORMODYNE;TRANDATE) injection 10 mg  10 mg IntraVENous Q2H PRN  
 lisinopril (PRINIVIL, ZESTRIL) tablet 10 mg  10 mg Oral DAILY  nitroglycerin (NITROBID) 2 % ointment 1 Inch  1 Inch Topical Q6H PRN  
 hydrALAZINE (APRESOLINE) 20 mg/mL injection 10 mg  10 mg IntraVENous Q4H PRN Lab Review:  
 
Recent Labs 11/16/18 
0305 11/15/18 
0048 11/14/18 
0111 WBC 7.4 6.0 5.8 HGB 11.6 11.8 11.4* HCT 38.9 38.5 37.6  198 205 Recent Labs 11/16/18 
0305 11/15/18 
0048 11/14/18 
0111  144 144  
K 3.9 3.5 3.8  108 107 CO2 29 30 32 GLU 91 94 94 BUN 16 17 17 CREA 1.70* 1.60* 1.42* CA 9.0 9.3 8.6 ALB 3.2* 3.3* 3.3* TBILI 0.8 0.7 0.7 SGOT 30 16 16 ALT 23 19 20 Lab Results Component Value Date/Time Glucose (POC) 115 (H) 11/15/2018 08:51 PM  
 Glucose (POC) 97 11/15/2018 04:27 PM  
 Glucose (POC) 88 11/15/2018 01:50 PM  
 Glucose (POC) 94 11/15/2018 12:03 PM  
 Glucose (POC) 109 (H) 11/15/2018 07:33 AM  
 
No results for input(s): PH, PCO2, PO2, HCO3, FIO2 in the last 72 hours. No results for input(s): INR in the last 72 hours. No lab exists for component: INREXT, INREXT Assessment:  
 
Principal Problem: 
  Acute on chronic systolic CHF (congestive heart failure) ([facility] (11/12/2018) Active Problems: 
  HBP (high blood pressure) (2/22/2013) Chronic [facility] fibrillation ([facility] (6/5/2016) Ischemic cardiomyopathy (11/13/2018) CAD (coronary artery disease) (11/13/2018) Stage 3 chronic kidney disease ([facility] (11/13/2018) Hypokalemia (10/7/2018) Epigastric abdominal pain (11/13/2018) S/P placement of cardiac pacemaker (11/13/2018) Plan:  
 
Risk of deterioration: medium 1. Change bumex to po 2. Monitor renal function 3. Diet per surgery 4. Back on po Pradaxa 5. Continue potassium supplementation 6.  Would like to have her work with PT today and in AM 
 7. Potentially home over weekend if stable Care Plan discussed with: Patient Discussed:  Care Plan Prophylaxis:  H2B/PPI and heparin drip Disposition:  Home w/Family 
        
___________________________________________________ Attending Physician: Maribel Hartmann MD

## 2018-11-16 NOTE — PROGRESS NOTES
Reason for Readmission:     Dyspnea due to acute on chronic systolic chf and acute cholecystitis. RRAT Score and Risk Level:   30 High Level of Readmission:    High Care Conference scheduled:   No- Notified NN Resources/supports as identified by patient/family:   is care taker and two daughters lives in [place]. Top Challenges facing patient (as identified by patient/family and CM): Finances/Medication cost?     Not an issue- member has medicare and supplement insurance. Transportation       provide transportation for appointments. Support system or lack thereof? Family Living arrangements? Single story home has 1 step Self-care/ADLs/Cognition? Independent, is available for care needs. Current Advanced Directive/Advance Care Plan:  Full code Plan for utilizing home health:   Yes [place] Likelihood of additional readmission:    
          
Transition of Care Plan:    Based on readmission, the patient's previous Plan of Care 
 has been evaluated and/or modified. The current Transition of Care Plan is:   TBD- member was admitted from [facility]. PT assessment is waiting for disposition plan. Memembr would like to use [facility] for [facility] care services. Care Management Interventions PCP Verified by CM: Yes(An apoinment has been scheduled for next tuesdaay) Palliative Care Criteria Met (RRAT>21 & CHF Dx)?: Yes 
Palliative Consult Recommended?: Yes(CHF protocole) Mode of Transport at Discharge: Self(Spouse will transport upon discharge) Transition of Care Consult (CM Consult): Discharge Planning(pt had previous [facility] in 2014  and [facility] [place] Discharge Durable Medical Equipment: (pt owns cane,rollator,walker and shower bench) Health Maintenance Reviewed: Yes Physical Therapy Consult: Yes Occupational Therapy Consult: Yes Speech Therapy Consult: No 
 Current Support Network: Lives with Spouse(single story home has 1 step) Confirm Follow Up Transport: Family Plan discussed with Pt/Family/Caregiver: Yes Discharge Location Discharge Placement: (TBD) [facility] care is also following pt. In 10/6/2018 PT had a dual pacemaker placed at [facility]. Dr. Kim Giraldo will see pt in 6 months. Using [place]. CM will available to coordinate with discharge needs. Gladys West MSW  Ext [phone]

## 2018-11-16 NOTE — PROGRESS NOTES
Cardiology Progress Note 11/16/2018     Admit Date: 11/12/2018 Admit Diagnosis: Acute on chronic systolic CHF (congestive heart failure) ([address] Hypokalemia  CC: none currently Cardiac Assessment/Plan:  
Ms Cheryl Nesbitt has a h/o: 
1) CAD (LAD ISELA 2014 for NQMI), Neg PET for ischemia 9/2018. 
2) mixed ischemic/tachycardia mediated CM 4/2014 (EF 20%); EF 45% 11/2017.  Entresto was too expensive. *Low EF 10/9/18 (15-20%): Med Rx for now. 3) HTN, On lower dose meds last months admit due to low BPs after EP procedure: increased @ rehab. 4) PAfib (RFA 4/2016 and 1/2017), Recurrrent/persistent afib 2017/2018 *Chronic Afib now; BiV device in 10/18/18; AV node abation 10/19/18:  Off amiodarone; on Xarelto. 5) DM,  
6) SHAYLA (on CPAP),  
7) CKD (Cr 1.5 range).  Aldactone stopped in 2014.  
  
Rec: non-cardiac CP; Med Rx; Increase lisinopril to 10 qday; cont toprol XL 50 (increase as needed; prev on lisinopril 20/toprol  every day and norvasc as below). Restart pradaxa (instead of current hep gtt) when able. 
  
ID/GI, SHAYLA, DM, Dispo: per primary team.  
For other plans, see orders. 11/13: Improved HTN; No anginal CP (less reproducible chest wall pain); no MI. Not vol overloaded; stable chronic afib; Remains on hep gtt rather than Pradaxa. HIDA scan pending; If needs surgery, moderate-to-high risk due to CM; No obviously modifiable risk factors are noted (other than better BP control). If she's having a \"low bleeding-risk procedure,\" pradaxa should be held for 2 days (GFR 3-50); If the procedure is considered \"high bleeding-risk,\" the pradaxa should be held for 4 days. 11/14: + HIDA scan reported; plan for surgery noted; Cardiac risk as above. No new cardiac recs; BP needs better control; add prn NTG paste for jasmina-op; increase ACEi after surgery if needed. Cont bblocker is important (IV if NPO). 11/15: BPs improved; clinically stable; no new recs. 11/16: doing well post op; BPs improved (increase lisinopril if needed); No new recs. _________________________________________________________________________________________ As previously noted: \"On d/c last month: \"To be off amio; D/C cardiac meds: lisinopril 2.5 qday; toprol XL 25 qday; pradaxa 150 bid; bumex 1 qday; KCL 10. Xarelto qday. ICD check 2 weeks; OV with me in 1 month. OK for rehab from cardiac standpoint. \" 
  
In rehab, she reports lisinopril increased to 5 qday and toprol XL to 50. 
____________________________________________________________________ 
  
The patient reports increased BPs (>200s/120), worse dyspnea; reproducible chest wall pain/abd pain/ 
No PND, orthopnea, palpitations, pre-syncope, syncope, new peripheral edema. Current c/o reproducible chest wall/abd pain. 
  
ECG: afib/Vpaced. Tele: afib/Vpaced. CXR: \"Small right pleural effusion suspected. No other evidence of acute finding. \" 
  
Notable labs: trops 0.08/0.08/0.06; Cr 1.45; k 2.8; Nl mag; proBNP 9k. Nl CBC. \" 
____________________________________________________________________________________________ Notable prior cardiac history:  @ OV 10/2/18: 
Ms Paddy Speaker has a h/o: 
1) CAD (LAD ISELA 2014 for NQMI), Neg PET for ischemia 9/2018. 
2) mixed ischemic/tachycardia mediated CM 4/2014 (EF 20%); EF 45% 11/2017.  Entresto was too expensive. 3) HTN, 4) PAfib (RFA 4/2016 and 1/2017), Recurrrent/persistent afib 2017/2018 5) DM,  
6) SHAYLA (on CPAP),  
7) CKD (Cr 1.5 range).  Aldactone stopped in 2014. 8) [facility] PPM 7/2014 for bradycardia while on therapy for AFib. 
  
11/2017: No CP/STEEN; Back in afib today:  Coreg changed Toprol-XL.   
10/2018:  Recent ER visit with epigastric pain; negative evaluation there & seen by Dr. Leland Arguelles who set up a cardiac PET.  The PET shows no ischemia but EF suggested at 16%.  Of note patient does have AFib with accelerated ventricular response.  No recurrence of epigastric discomfort.  No bleeding.  Had a simple trip at home a few weeks ago without injury. 
  
IMPRESSION AND PLAN 
   
01. (I25.10) Atherosclerotic heart disease of native coronary artery without angina pectoris:  No anginal symptoms. Cont asa and stopped plavix with need for anticoagulation. 
  
We discussed the signs and symptoms of unstable angina, myocardial infarction and malignant arrhythmia.  The patient knows to seek immediate medical attention should they occur.  ECG done today 02. (I42.0) Dilated cardiomyopathy:  Mixed ischemic/non-ischemic (tachycardia mediated) CM.  Her ejection fraction is greater than 35% after repeat echo.  The patient's systolic function has been stable. I suspect the PET has a falsely low EF related to her AFib but repeat echo is needed.  2-D w/CFD Echo to be done first available. 03. (I50.42) Chronic combined systolic (congestive) and diastolic (congestive) heart failure:  Resolved exertional symptoms.  (NYHA I)  The patient is compliant with their CHF regimen. 04. (I48.1) Persistent atrial fibrillation:  Management per Dr Aaron Santana; currently off amio and on Pradaxa.  Heart rate has been to elevated; increase Toprol  q.day.  Further AFib monitoring/therapy per Dr. Gloria Rodriguez. 05. (I13.0) Hyp hrt & chr kdny dis w hrt fail and stg 1-4/unsp chr kdny:  Adequately controlled.  We will see how the patient does with the med changes noted above. 06. (E78.2) Mixed hyperlipidemia:  Lipid labs drawn by PCP. The patient is tolerating lipid lowering therapy well. 07. (N18.3) Chronic kidney disease, stage 3 (moderate):  Cr 1.24/GFR46 11/2015.  Cr 1.32/GFR 43 1/2017.  
08. (R60.0) Localized edema: Larrie Pry avoids salt. 09. (E11.69) Type 2 diabetes mellitus with other specified complication:  Lipids & HTN as noted above; DM managed by other MD.  
10. (Z95.0) Presence of cardiac pacemaker:  will continue to be followed in device clinic. 11. (Z79.82) Long term (current) use of aspirin:  This condition is stable. 12. (Z79.01) Long term (current) use of anticoagulants:  This condition is stable.  Indicated for atrial fibrillation.  No bleeding. If she has recurrent falls, she knows to call for re-evaluation of anticoagulation. 13. (Z68.38) Body mass index (BMI) 38.0-38.9, adult:  The patient was instructed on AHA diet and regular exercise.  
  
ORDERS: 
       
1 ECG done today    
2 2-D w/ CFD Echocardiogram first available.    
3 Return office visit with Magui Little MD in 6 Months.    
4 The patient was instructed on AHA diet and regular exercise.    
  
  
10/2/18 MEDICATION LIST Medication Sig Description  
amlodipine 5 mg tablet take 1 tablet by oral route  every day per pc  
aspirin 81 mg tablet,delayed release take 1 tablet by oral route  every day  
atorvastatin 40 mg tablet take 1 tablet by oral route  every evening  
bumetanide 1 mg tablet 1 in am on tues&thursday 2 tablets on mon wed fridays Calcium 600 600 mg (1,500 mg) tablet daily  
clonazepam 0.5 mg tablet take 1 tablet by oral route  every day LISINOPRIL 20 MG Tablet TAKE 1 TABLET EVERY DAY Lyrica 200 mg capsule take 1 capsule by oral route 2 times every day  
magnesium 250 mg tablet take 1 tablet by oral route  every day  
metoprolol succinate  mg tablet,extended release 24 hr take 1 tablet by oral route  every day  
potassium gluconate 500 mg (83 mg) tablet daily Pradaxa 150 mg capsule take 1 capsule by oral route 2 times every day Premarin 0.625 mg/gram vaginal cream insert 0.5 applicatorful by vaginal route  every day cyclically, 3 weeks on and 1 week off  
venlafaxine 75 mg tablet 2 po bid Vitamin D3 1,000 unit capsule take 1 daily  
  
____________________________________________________________________________________________________ CARDIAC HISTORY 
CAD: 
     
1 NSTEMI [95% Proximal LAD, Resolute (ISELA) to the Proximal LAD.] - 4/8/2014  
  
CHF/CM: 
      
1 Mixed ischemic/tachycardic CM. [Nl TSH.] - 4/2014  
2 Ischemic & tachycardic Cardiomyopathy [Echo (EF 0.45) - 06/17/2014, (prev EF 20-30%)] - 6/2014  
  
ARRHYTHMIA: 
     
1 A Fib [DCCV, Plan: amio started; Eliquis with Effient (change eliquis to asa if NSR after 30 days). ] - 4/8/2014  
2 PAF - 8/2010  
3 A Fib, recurrent; and 6/2014. [Had marked sinus bradycardia while on bblocker/dig.] - 5/2014  
4 Sinus Bradycardia. - 6/2/2014  
5 PPM (Devices (Dual Chamber PPM (Glory Briana)) - 7/17/2014) - 7/17/2014  
6 PAF [CVR; Eliquis restarted (plavix stopped). ] - 9/2015  
7 A Fib [RFA] - 4/2016  
8 A Fib [RFA] - 1/2017  
  
RISK FACTORS: 
     
1 Diabetes [Diagnosed in 2014] 2 Hypertension 3 Dyslipidemia  
  
  
CARDIOVASCULAR PROCEDURES Procedure Date Results Cardiac PET 09/24/2018 EF 0.16 (16%), physiologic apical thinning with no ischemia Cath 04/08/2014 95% Proximal LAD, Resolute (ISELA) to the Proximal LAD. DCCV 04/08/2014 Initial Rhythm A Fib, Final Rhythm Sinus Devices 07/17/2014 Dual Chamber PPM (Glory Briana) Echo 11/28/2017 EF 0.45 (45%), Mild Global Hypo, Mild TR, Mild LVH, Mild MR, Est RVSP 41 mmHg, Normal RV. (probable trileaflet AoV). Echo 10/30/2015 EF 0.45 (45%), Mild LVH, LA Diam 4.1 cm, Est RVSP 35 mmHg, Normal RV. ?bicuspid AoV; (Prob trileaflet on previous ANGELITO). Echo 06/17/2014 EF 0.45 (45%), EF range 45%-50%, Mild LV dilatation. Mild LV systolic dysfunction. ? Bicuspid AoV but prob trileafet on previous ANGELITO. Echo 04/03/2014 EF 0.20 (20%), global HK with lateral sparing; no valve disease. EKG 10/02/2018 Atrial Fib, ; nonspecific T-wave flattening. Holter 06/09/2014 No Malignant Arrhythmias, Atrial Fib, No correlation with symptoms, (, [address] Holter 04/30/2014 No Malignant Arrhythmias, Atrial Fib, No correlation with symptoms, \"light or heavy chest\" = afib in 60s. HR  (ave 63). ASx pauses (upto 2.8) while asleep. RFA 2017 Indication A Fib, Final Rhythm Sinus RFA   Indication A Fib Sleep Study   OSAS: Moderate ANGELITO 2014 EF 0.35 (35%), No BRAYDON Clot, prob trileaflet AoV. For other plans, see orders. Hospital problem list  
Active Hospital Problems Diagnosis Date Noted  Chronic cholecystitis 2018  Ischemic cardiomyopathy 2018  CAD (coronary artery disease) 2018  Epigastric abdominal pain 2018  S/P placement of cardiac pacemaker 2018  Stage 3 chronic kidney disease ([facility] 2018  Acute on chronic systolic CHF (congestive heart failure) ([facility] 2018  Hypokalemia 10/07/2018  Chronic atrial fibrillation ([facility] 2016  
 HBP (high blood pressure) 2013 Subjective: Yvonne Caleb reports Chest pain X none  consistent with:  Non-cardiac CP Atypical CP None now  On going  Anginal CP Dyspnea X none  at rest  with exertion    
    improved  unchanged  worse PND X none  overnight Orthopnea X none  improved  unchanged  worse Presyncope X none  improved  unchanged  worse Ambulated in hallway without symptoms   Yes Ambulated in room without symptoms  Yes Objective:  
 Physical Exam: 
Overall VSSAF;   
Visit Vitals /87 (BP 1 Location: Left arm, BP Patient Position: Sitting) Pulse 80 Temp 98.2 °F (36.8 °C) Resp 17 Ht 5' 9\" (1.753 m) Wt 104.8 kg (231 lb 1.6 oz) SpO2 93% BMI 34.13 kg/m² Temp (24hrs), Av.6 °F (36.4 °C), Min:96.6 °F (35.9 °C), Max:98.2 °F (36.8 °C) Patient Vitals for the past 8 hrs: 
 Pulse 18 0754 80  
18 0255 80 Patient Vitals for the past 8 hrs: 
 Resp  
18 0754 17  
18 0255 16 Patient Vitals for the past 8 hrs: 
 BP  
18 0754 133/87  
18 0255 137/83 Intake/Output Summary (Last 24 hours) at 2018 0441 Last data filed at 2018 5727 Gross per 24 hour Intake 600 ml Output 1655 ml Net -1055 ml General Appearance: Well developed, well nourished, no acute distress. Ears/Nose/Mouth/Throat:   Normal MM; anicteric. JVP: WNL Resp:   clear to auscultation bilaterally. Nl resp effort. Cardiovascular:  RRR, S1, S2 normal, no new murmur. No gallop or rub. Abdomen:   Soft, non-tender, bowel sounds are present. Extremities: Mild edema bilaterally. Skin: 
Neuro: Warm and dry. A/O x3, grossly nonfocal  
cath site intact w/o hematoma or new bruit; distal pulse unchanged  Yes Data Review:    
Telemetry independently reviewed  sinus x chronic afib x V pacing  NSVT  
 
ECG independently reviewed  NSR  afib  no significant changes  NSST-Tw chgs  
 no new ECG provided for review Lab results reviewed as noted below. Current medications reviewed as noted below. No results for input(s): PH, PCO2, PO2 in the last 72 hours. No results for input(s): CPK, CKMB, CKNDX, TROIQ in the last 72 hours. Recent Labs 11/16/18 
0305 11/15/18 
0048 11/14/18 
0111  144 144  
K 3.9 3.5 3.8  108 107 CO2 29 30 32 BUN 16 17 17 CREA 1.70* 1.60* 1.42* GFRAA 36* 39* 45* GLU 91 94 94 CA 9.0 9.3 8.6 ALB 3.2* 3.3* 3.3* WBC 7.4 6.0 5.8 HGB 11.6 11.8 11.4* HCT 38.9 38.5 37.6  198 205 Lab Results Component Value Date/Time Cholesterol, total 104 08/01/2018 10:17 AM  
 HDL Cholesterol 33 (L) 08/01/2018 10:17 AM  
 LDL, calculated 49 08/01/2018 10:17 AM  
 Triglyceride 110 08/01/2018 10:17 AM  
 CHOL/HDL Ratio 4.7 04/03/2014 03:30 PM  
 
Recent Labs 11/16/18 
0305 11/15/18 
0048 11/14/18 
0111 SGOT 30 16 16 * 132* 141* TP 6.2* 6.3* 6.2* ALB 3.2* 3.3* 3.3*  
GLOB 3.0 3.0 2.9 Recent Labs 11/15/18 
0048 11/14/18 
0111 APTT 51.8* 63.6* No components found for: [place] Current Facility-Administered Medications Medication Dose Route Frequency  bumetanide (BUMEX) tablet 2 mg  2 mg Oral DAILY  dabigatran etexilate (PRADAXA) capsule 150 mg  150 mg Oral BID  sodium chloride (NS) flush 5-10 mL  5-10 mL IntraVENous Q8H  
 sodium chloride (NS) flush 5-10 mL  5-10 mL IntraVENous PRN  
 acetaminophen (TYLENOL) tablet 650 mg  650 mg Oral Q6H PRN  
 fentaNYL citrate (PF) injection 50 mcg  50 mcg IntraVENous Q6H PRN  
 oxyCODONE IR (ROXICODONE) tablet 5 mg  5 mg Oral Q6H PRN  
 ondansetron (ZOFRAN) injection 4 mg  4 mg IntraVENous Q6H PRN  
 docusate sodium (COLACE) capsule 100 mg  100 mg Oral BID  atorvastatin (LIPITOR) tablet 40 mg  40 mg Oral QHS  clonazePAM (KlonoPIN) tablet 0.5 mg  0.5 mg Oral QHS  potassium chloride SR (KLOR-CON 10) tablet 20 mEq  20 mEq Oral DAILY  pregabalin (LYRICA) capsule 200 mg  200 mg Oral BID  venlafaxine-SR (EFFEXOR-XR) capsule 150 mg  150 mg Oral BID WITH MEALS  metoprolol succinate (TOPROL-XL) XL tablet 100 mg  100 mg Oral DAILY  pantoprazole (PROTONIX) tablet 40 mg  40 mg Oral ACB  insulin lispro (HUMALOG) injection   SubCUTAneous AC&HS  
 glucose chewable tablet 16 g  4 Tab Oral PRN  
 dextrose (D50W) injection syrg 12.5-25 g  12.5-25 g IntraVENous PRN  
 glucagon (GLUCAGEN) injection 1 mg  1 mg IntraMUSCular PRN  
 labetalol (NORMODYNE;TRANDATE) injection 10 mg  10 mg IntraVENous Q2H PRN  
 lisinopril (PRINIVIL, ZESTRIL) tablet 10 mg  10 mg Oral DAILY  nitroglycerin (NITROBID) 2 % ointment 1 Inch  1 Inch Topical Q6H PRN  
 hydrALAZINE (APRESOLINE) 20 mg/mL injection 10 mg  10 mg IntraVENous Q4H PRN Sabine Aburto MD

## 2018-11-17 LAB
ANION GAP SERPL CALC-SCNC: 7 MMOL/L (ref 5–15)
BUN SERPL-MCNC: 20 MG/DL (ref 6–20)
BUN/CREAT SERPL: 10 (ref 12–20)
CALCIUM SERPL-MCNC: 8.9 MG/DL (ref 8.5–10.1)
CHLORIDE SERPL-SCNC: 105 MMOL/L (ref 97–108)
CO2 SERPL-SCNC: 32 MMOL/L (ref 21–32)
CREAT SERPL-MCNC: 2 MG/DL (ref 0.55–1.02)
ERYTHROCYTE [DISTWIDTH] IN BLOOD BY AUTOMATED COUNT: 18.6 % (ref 11.5–14.5)
GLUCOSE BLD STRIP.AUTO-MCNC: 115 MG/DL (ref 65–100)
GLUCOSE BLD STRIP.AUTO-MCNC: 116 MG/DL (ref 65–100)
GLUCOSE BLD STRIP.AUTO-MCNC: 120 MG/DL (ref 65–100)
GLUCOSE BLD STRIP.AUTO-MCNC: 137 MG/DL (ref 65–100)
GLUCOSE SERPL-MCNC: 101 MG/DL (ref 65–100)
HCT VFR BLD AUTO: 38.6 % (ref 35–47)
HGB BLD-MCNC: 11.8 G/DL (ref 11.5–16)
MCH RBC QN AUTO: 28.9 PG (ref 26–34)
MCHC RBC AUTO-ENTMCNC: 30.6 G/DL (ref 30–36.5)
MCV RBC AUTO: 94.6 FL (ref 80–99)
NRBC # BLD: 0 K/UL (ref 0–0.01)
NRBC BLD-RTO: 0 PER 100 WBC
PLATELET # BLD AUTO: 207 K/UL (ref 150–400)
PMV BLD AUTO: 12.6 FL (ref 8.9–12.9)
POTASSIUM SERPL-SCNC: 3.4 MMOL/L (ref 3.5–5.1)
RBC # BLD AUTO: 4.08 M/UL (ref 3.8–5.2)
SERVICE CMNT-IMP: ABNORMAL
SODIUM SERPL-SCNC: 144 MMOL/L (ref 136–145)
WBC # BLD AUTO: 8.4 K/UL (ref 3.6–11)

## 2018-11-17 PROCEDURE — 94760 N-INVAS EAR/PLS OXIMETRY 1: CPT

## 2018-11-17 PROCEDURE — 74011250637 HC RX REV CODE- 250/637: Performed by: INTERNAL MEDICINE

## 2018-11-17 PROCEDURE — 65660000000 HC RM CCU STEPDOWN

## 2018-11-17 PROCEDURE — 74011250637 HC RX REV CODE- 250/637: Performed by: HOSPITALIST

## 2018-11-17 PROCEDURE — 85027 COMPLETE CBC AUTOMATED: CPT

## 2018-11-17 PROCEDURE — 74011250637 HC RX REV CODE- 250/637: Performed by: SURGERY

## 2018-11-17 PROCEDURE — 80048 BASIC METABOLIC PNL TOTAL CA: CPT

## 2018-11-17 PROCEDURE — 36415 COLL VENOUS BLD VENIPUNCTURE: CPT

## 2018-11-17 PROCEDURE — 82962 GLUCOSE BLOOD TEST: CPT

## 2018-11-17 RX ORDER — AMLODIPINE BESYLATE 5 MG/1
2.5 TABLET ORAL DAILY
Status: DISCONTINUED | OUTPATIENT
Start: 2018-11-17 | End: 2018-11-18 | Stop reason: HOSPADM

## 2018-11-17 RX ORDER — LISINOPRIL 5 MG/1
10 TABLET ORAL ONCE
Status: DISCONTINUED | OUTPATIENT
Start: 2018-11-17 | End: 2018-11-17

## 2018-11-17 RX ORDER — LISINOPRIL 5 MG/1
10 TABLET ORAL DAILY
Status: DISCONTINUED | OUTPATIENT
Start: 2018-11-18 | End: 2018-11-18 | Stop reason: HOSPADM

## 2018-11-17 RX ORDER — LISINOPRIL 20 MG/1
20 TABLET ORAL DAILY
Status: DISCONTINUED | OUTPATIENT
Start: 2018-11-18 | End: 2018-11-17

## 2018-11-17 RX ADMIN — ATORVASTATIN CALCIUM 40 MG: 40 TABLET, FILM COATED ORAL at 21:33

## 2018-11-17 RX ADMIN — Medication 10 ML: at 06:03

## 2018-11-17 RX ADMIN — PANTOPRAZOLE SODIUM 40 MG: 40 TABLET, DELAYED RELEASE ORAL at 10:42

## 2018-11-17 RX ADMIN — PREGABALIN 200 MG: 150 CAPSULE ORAL at 10:42

## 2018-11-17 RX ADMIN — VENLAFAXINE HYDROCHLORIDE 150 MG: 150 CAPSULE, EXTENDED RELEASE ORAL at 18:04

## 2018-11-17 RX ADMIN — BUMETANIDE 2 MG: 1 TABLET ORAL at 10:42

## 2018-11-17 RX ADMIN — CLONAZEPAM 0.5 MG: 0.5 TABLET ORAL at 21:33

## 2018-11-17 RX ADMIN — LISINOPRIL 10 MG: 5 TABLET ORAL at 10:42

## 2018-11-17 RX ADMIN — OXYCODONE HYDROCHLORIDE 5 MG: 5 TABLET ORAL at 06:02

## 2018-11-17 RX ADMIN — PREGABALIN 200 MG: 150 CAPSULE ORAL at 18:04

## 2018-11-17 RX ADMIN — METOPROLOL SUCCINATE 100 MG: 50 TABLET, EXTENDED RELEASE ORAL at 10:43

## 2018-11-17 RX ADMIN — Medication 10 ML: at 21:34

## 2018-11-17 RX ADMIN — DABIGATRAN ETEXILATE MESYLATE 150 MG: 150 CAPSULE ORAL at 18:48

## 2018-11-17 RX ADMIN — DABIGATRAN ETEXILATE MESYLATE 150 MG: 150 CAPSULE ORAL at 10:44

## 2018-11-17 RX ADMIN — POTASSIUM CHLORIDE 20 MEQ: 750 TABLET, FILM COATED, EXTENDED RELEASE ORAL at 10:43

## 2018-11-17 RX ADMIN — AMLODIPINE BESYLATE 2.5 MG: 5 TABLET ORAL at 14:00

## 2018-11-17 RX ADMIN — Medication 10 ML: at 14:02

## 2018-11-17 RX ADMIN — DOCUSATE SODIUM 100 MG: 100 CAPSULE, LIQUID FILLED ORAL at 18:04

## 2018-11-17 RX ADMIN — DOCUSATE SODIUM 100 MG: 100 CAPSULE, LIQUID FILLED ORAL at 10:43

## 2018-11-17 RX ADMIN — VENLAFAXINE HYDROCHLORIDE 150 MG: 150 CAPSULE, EXTENDED RELEASE ORAL at 10:43

## 2018-11-17 NOTE — PROGRESS NOTES
[address] 
[phone] Medical Progress Note NAME: Aníbal Toro :  1950 MRM:  [id number] Date/Time: 2018  12:33 PM 
  
Subjective:  
 
Chart reviewed and patient seen and examined and d/W her  and her nurse and all events noted. She is a 80 y/o female admitted with CAD, mixed ischemic/tachycardia cardiomyopathy, chronic afib - s/p av node ablation and pacemaker placement admitted with acute on chronic systolic CHF with 11 lb weight gain and elevated BNP since last discharge and epigastric abdominal pain. Started on O2 and IV diuresis with improvement in breathing. Seen by surgery and placed on clear liquids along with IV levaquin and flagyl for possible cholecysititis before cholecystectomy done on 11/15. She has been seen by cardiology and med adjustments were made. She feels better and is sitting in chair without SOB and has no other cardio/respiratory c/o. She has tolerated lap becky done 11/15 and is w/o N/V tolerating her diet and has no other GI c/o. She has had a problem with BP elevation over the hospitalization with medication adjustment again this AM. She has no other c/o on complete ROS. Past Medical History:  
Diagnosis Date  Anxiety 2018  Arthritis OA  Asthma  Diabetes ([address]  Essential hypertension  GERD (gastroesophageal reflux disease)  Hypercholesterolemia 2018  Hypertension  Ill-defined condition   
 diverticulitis  Long-term use of high-risk medication 2018  Neuropathy  Other ill-defined conditions(799.89) IBS, spinal stenosis  Plantar fasciitis  Psychiatric disorder   
 depression, anxiety  Sleep apnea   
 uses CPAP  Type 2 diabetes mellitus with diabetic neuropathy, without long-term current use of insulin ([address] 2016 ROS:  General: negative for fever, chills, sweats, weakness Eyes: negative for blurred or double vision ENT: negative for ST Respiratory:  positive for less SOB with activity Cardiology:  negative for chest pain, palpitations, orthopnea, PND, edema, syncope Gastrointestinal: positive for improvement in epigastric abdominal pain without N/V Genitourinary: negative for frequency, urgency, dysuria, hematuria, incontinence Muskuloskeletal : negative for arthralgia, myalgia Dermatological: negative for rash, ulceration, mole change, new lesion Neurological: negative for headache, dizziness, confusion, focal weakness, paresthesia, memory loss, gait disturbance Psychological: negative for anxiety, depression, agitation Review of systems otherwise negative Objective:  
 
 
Vitals:  
 
  
Last 24hrs VS reviewed since prior progress note. Most recent are: 
 
Visit Vitals /81 (BP 1 Location: Right arm, BP Patient Position: Sitting) Pulse 80 Temp 98.3 °F (36.8 °C) Resp 16 Ht 5' 9\" (1.753 m) Wt 232 lb (105.2 kg) SpO2 95% BMI 34.26 kg/m² SpO2 Readings from Last 6 Encounters:  
11/17/18 95% 10/22/18 99% 09/10/18 96% 08/07/18 94% 08/01/18 96% 03/14/18 96% O2 Flow Rate (L/min): 2 l/min Intake/Output Summary (Last 24 hours) at 11/17/2018 1233 Last data filed at 11/17/2018 0220 Gross per 24 hour Intake 220 ml Output 750 ml Net -530 ml Telemetry reviewed:   normal sinus rhythm, Paced Tubes:   N/A 
X-Ray:  Chest xray - Small right pleural effusion suspected. No other evidence of acute finding CT head - no acute abnormality U/S abdomen - Cholelithiasis with mild gallbladder wall thickening and tenderness 
in the region of the gallbladder concerning for acute cholecystitis. Small right 
pleural effusion. Cortical thinning right kidney compatible with medical renal 
Disease. NM V/Q scan - Very low probability for pulmonary embolism NM hepatobiliary scan -There is delayed activity identified within the gallbladder 
consistent with chronic cholecystitis Procedures:  Laparoscopic cholecystectomy Exam:  
 
General   well developed, well nourished, appears stated age, in no acute distress Eyes  Sclera anicteric, PERRL, EOMI 
ENT  No thrush Neck   Supple without nodes or mass. No thyromegaly or bruit Respiratory   Much clearer with no rales heard Cardiology  Regular Rate and Rythmn  - no murmurs, rubs or gallops Abdominal  Soft, expected post-op discomfort, non-distended, positive bowel sounds, no hepatosplenomegaly Extremities  Trace edema present Skin  Normal skin turgor. No rashes or skin ulcers noted Neurological  No focal neurological deficits noted Psychological  Oriented x 3. Normal affect. Exam otherwise negative Lab Data Reviewed: (see below) Medications Reviewed: (see below) 
 
______________________________________________________________________ Medications:  
 
Current Facility-Administered Medications Medication Dose Route Frequency  [START ON 11/18/2018] lisinopril (PRINIVIL, ZESTRIL) tablet 10 mg  10 mg Oral DAILY  amLODIPine (NORVASC) tablet 2.5 mg  2.5 mg Oral DAILY  bumetanide (BUMEX) tablet 2 mg  2 mg Oral DAILY  dabigatran etexilate (PRADAXA) capsule 150 mg  150 mg Oral BID  sodium chloride (NS) flush 5-10 mL  5-10 mL IntraVENous Q8H  
 sodium chloride (NS) flush 5-10 mL  5-10 mL IntraVENous PRN  
 acetaminophen (TYLENOL) tablet 650 mg  650 mg Oral Q6H PRN  
 fentaNYL citrate (PF) injection 50 mcg  50 mcg IntraVENous Q6H PRN  
 oxyCODONE IR (ROXICODONE) tablet 5 mg  5 mg Oral Q6H PRN  
 ondansetron (ZOFRAN) injection 4 mg  4 mg IntraVENous Q6H PRN  
 docusate sodium (COLACE) capsule 100 mg  100 mg Oral BID  atorvastatin (LIPITOR) tablet 40 mg  40 mg Oral QHS  clonazePAM (KlonoPIN) tablet 0.5 mg  0.5 mg Oral QHS  potassium chloride SR (KLOR-CON 10) tablet 20 mEq  20 mEq Oral DAILY  pregabalin (LYRICA) capsule 200 mg  200 mg Oral BID  
  venlafaxine-SR (EFFEXOR-XR) capsule 150 mg  150 mg Oral BID WITH MEALS  metoprolol succinate (TOPROL-XL) XL tablet 100 mg  100 mg Oral DAILY  pantoprazole (PROTONIX) tablet 40 mg  40 mg Oral ACB  insulin lispro (HUMALOG) injection   SubCUTAneous AC&HS  
 glucose chewable tablet 16 g  4 Tab Oral PRN  
 dextrose (D50W) injection syrg 12.5-25 g  12.5-25 g IntraVENous PRN  
 glucagon (GLUCAGEN) injection 1 mg  1 mg IntraMUSCular PRN  
 labetalol (NORMODYNE;TRANDATE) injection 10 mg  10 mg IntraVENous Q2H PRN  
 nitroglycerin (NITROBID) 2 % ointment 1 Inch  1 Inch Topical Q6H PRN  
 hydrALAZINE (APRESOLINE) 20 mg/mL injection 10 mg  10 mg IntraVENous Q4H PRN Lab Review:  
 
Recent Labs 11/17/18 0226 11/16/18 
0305 11/15/18 
8306 WBC 8.4 7.4 6.0 HGB 11.8 11.6 11.8 HCT 38.6 38.9 38.5  209 198 Recent Labs 11/17/18 0226 11/16/18 
0305 11/15/18 
2706  143 144  
K 3.4* 3.9 3.5  107 108 CO2 32 29 30 * 91 94 BUN 20 16 17 CREA 2.00* 1.70* 1.60* CA 8.9 9.0 9.3 ALB  --  3.2* 3.3* TBILI  --  0.8 0.7 SGOT  --  30 16 ALT  --  23 19 Lab Results Component Value Date/Time Glucose (POC) 137 (H) 11/17/2018 07:15 AM  
 Glucose (POC) 108 (H) 11/16/2018 08:57 PM  
 Glucose (POC) 112 (H) 11/16/2018 04:27 PM  
 Glucose (POC) 106 (H) 11/16/2018 11:12 AM  
 Glucose (POC) 97 11/16/2018 07:54 AM  
 
No results for input(s): PH, PCO2, PO2, HCO3, FIO2 in the last 72 hours. No results for input(s): INR in the last 72 hours. No lab exists for component: INREXT, INREXT Assessment:  
 
Principal Problem: 
  Acute on chronic systolic CHF (congest[facility] failure) ([facility] (11/12/2018) Active Problems: 
  HBP (high blood pressure) (2/22/2013) Chronic atrial fibrillation ([facility] (6/5/2016) Hypokalemia (10/7/2018) Ischemic cardiomyopathy (11/13/2018) CAD (coronary artery disease) (11/13/2018) Epigastric abdominal pain (11/13/2018) S/P placement of cardiac pacemaker (11/13/2018) Stage 3 chronic kidney disease ([address] (11/13/2018) Chronic cholecystitis (11/16/2018) Plan:  
 
Risk of deterioration: medium 1. Changed Bumex to po 2. Monitor renal function 3. Diet per surgery being tolerated 4. Back on po Pradaxa 5. Continue potassium supplementation 6. Low dose Norvasc started today for HTN 7. Lisinopril and metoprolol doses w/o change 8. Potentially home tomorrow if stable Care Plan discussed with: Patient Discussed:  Care Plan Prophylaxis:  H2B/PPI and heparin drip Disposition:  Home w/Family 
        
___________________________________________________ Attending Physician: Shelly Oconnell MD

## 2018-11-17 NOTE — PROGRESS NOTES
Admit Date: 2018 POD 2 Day Post-Op Procedure:  Procedure(s): LAPAROSCOPIC CHOLECYSTECTOMY Subjective:  
 
Patient has no complaints. bahman po well. Objective:  
 
Blood pressure 159/81, pulse 80, temperature 98.3 °F (36.8 °C), resp. rate 16, height 5' 9\" (1.753 m), weight 232 lb (105.2 kg), SpO2 95 %. Temp (24hrs), Av.9 °F (36.6 °C), Min:97.4 °F (36.3 °C), Max:98.3 °F (36.8 °C) Physical Exam:  GENERAL: alert, cooperative, no distress, appears stated age, LUNG: clear to auscultation bilaterally, HEART: regular rate and rhythm, ABDOMEN: soft, non-tender. Bowel sounds normal. No masses,  no organomegaly, wounds c/d/i, EXTREMITIES:  extremities normal, atraumatic, no cyanosis or edema Labs:  
Recent Results (from the past 24 hour(s)) GLUCOSE, POC Collection Time: 18  4:27 PM  
Result Value Ref Range Glucose (POC) 112 (H) 65 - 100 mg/dL Performed by Delon Sandhoff (PCT) GLUCOSE, POC Collection Time: 18  8:57 PM  
Result Value Ref Range Glucose (POC) 108 (H) 65 - 100 mg/dL Performed by Rehan Godwin CBC W/O DIFF Collection Time: 18  2:26 AM  
Result Value Ref Range WBC 8.4 3.6 - 11.0 K/uL  
 RBC 4.08 3.80 - 5.20 M/uL  
 HGB 11.8 11.5 - 16.0 g/dL HCT 38.6 35.0 - 47.0 % MCV 94.6 80.0 - 99.0 FL  
 MCH 28.9 26.0 - 34.0 PG  
 MCHC 30.6 30.0 - 36.5 g/dL  
 RDW 18.6 (H) 11.5 - 14.5 % PLATELET 098 919 - 962 K/uL MPV 12.6 8.9 - 12.9 FL  
 NRBC 0.0 0  WBC ABSOLUTE NRBC 0.00 0.00 - 0.01 K/uL METABOLIC PANEL, BASIC Collection Time: 18  2:26 AM  
Result Value Ref Range Sodium 144 136 - 145 mmol/L Potassium 3.4 (L) 3.5 - 5.1 mmol/L Chloride 105 97 - 108 mmol/L  
 CO2 32 21 - 32 mmol/L Anion gap 7 5 - 15 mmol/L Glucose 101 (H) 65 - 100 mg/dL BUN 20 6 - 20 MG/DL Creatinine 2.00 (H) 0.55 - 1.02 MG/DL  
 BUN/Creatinine ratio 10 (L) 12 - 20 GFR est AA 30 (L) >60 ml/min/1.73m2 GFR est non-AA 25 (L) >60 ml/min/1.73m2 Calcium 8.9 8.5 - 10.1 MG/DL  
GLUCOSE, POC Collection Time: 11/17/18  7:15 AM  
Result Value Ref Range Glucose (POC) 137 (H) 65 - 100 mg/dL Performed by Paco Aguiar Data Review images and reports reviewed Assessment:  
 
Principal Problem: 
  Acute on chronic systolic CHF (congestive heart failure) ([address] (11/12/2018) Active Problems: 
  HBP (high blood pressure) (2/22/2013) Chronic atrial fibrillation ([address] (6/5/2016) Hypokalemia (10/7/2018) Ischemic cardiomyopathy (11/13/2018) CAD (coronary artery disease) (11/13/2018) Epigastric abdominal pain (11/13/2018) S/P placement of cardiac pacemaker (11/13/2018) Stage 3 chronic kidney disease ([address] (11/13/2018) Chronic cholecystitis (11/16/2018) Plan/Recommendations/Medical Decision Making:  
 
Continue present treatment GI lite diet Doing well s/p lap cholecystectomy Complex pt with significant cardiac disease Suitable for d/c at any time from surgical standpoint. F/u with me in 2 weeks. I restarted her Pradaxa yesterday. Aditya Santana. Marichuy Mccarty MD, [facility] [address]

## 2018-11-17 NOTE — PROGRESS NOTES
Cardiology Progress Note 11/17/2018     Admit Date: 11/12/2018 Admit Diagnosis: Acute on chronic systolic CHF (congestive heart failure) ([address] Hypokalemia  CC: none currently Cardiac Assessment/Plan:  
Ms Gerald Herrera has a h/o: 
1) CAD (LAD ISELA 2014 for NQMI), Neg PET for ischemia 9/2018. 
2) mixed ischemic/tachycardia mediated CM 4/2014 (EF 20%); EF 45% 11/2017.  Entresto was too expensive. *Low EF 10/9/18 (15-20%): Med Rx for now. 3) HTN, On lower dose meds last months admit due to low BPs after EP procedure: increased @ rehab. 4) PAfib (RFA 4/2016 and 1/2017), Recurrrent/persistent afib 2017/2018 *Chronic Afib now; BiV device in 10/18/18; AV node abation 10/19/18:  Off amiodarone; on Xarelto. 5) DM,  
6) SHAYLA (on CPAP),  
7) CKD (Cr 1.5 range).  Aldactone stopped in 2014.  
  
Rec: non-cardiac CP; Med Rx; Increase lisinopril to 10 qday; cont toprol XL 50 (increase as needed; prev on lisinopril 20/toprol  every day and norvasc as below). Restart pradaxa (instead of current hep gtt) when able. 
  
ID/GI, SHAYLA, DM, Dispo: per primary team.  
For other plans, see orders. 11/13: Improved HTN; No anginal CP (less reproducible chest wall pain); no MI. Not vol overloaded; stable chronic afib; Remains on hep gtt rather than Pradaxa. HIDA scan pending; If needs surgery, moderate-to-high risk due to CM; No obviously modifiable risk factors are noted (other than better BP control). If she's having a \"low bleeding-risk procedure,\" pradaxa should be held for 2 days (GFR 3-50); If the procedure is considered \"high bleeding-risk,\" the pradaxa should be held for 4 days. 11/14: + HIDA scan reported; plan for surgery noted; Cardiac risk as above. No new cardiac recs; BP needs better control; add prn NTG paste for jasmina-op; increase ACEi after surgery if needed. Cont bblocker is important (IV if NPO). 11/15: BPs improved; clinically stable; no new recs. 11/16: doing well post op; BPs improved (increase lisinopril if needed); No new recs. 11/17:  Doing well, NAEO.  sCr up to 2. BP higher. Will hold on increasing ACEi at this time; start low dose norvasc 2.5mg. Cont pradaxa and Toprol XL 100mg. Cont lisinopril 10mg. 
_________________________________________________________________________________________ As previously noted: \"On d/c last month: \"To be off amio; D/C cardiac meds: lisinopril 2.5 qday; toprol XL 25 qday; pradaxa 150 bid; bumex 1 qday; KCL 10. Xarelto qday. ICD check 2 weeks; OV with me in 1 month. OK for rehab from cardiac standpoint. \" 
  
In rehab, she reports lisinopril increased to 5 qday and toprol XL to 50. 
____________________________________________________________________ 
  
The patient reports increased BPs (>200s/120), worse dyspnea; reproducible chest wall pain/abd pain/ 
No PND, orthopnea, palpitations, pre-syncope, syncope, new peripheral edema. Current c/o reproducible chest wall/abd pain. 
  
ECG: afib/Vpaced. Tele: afib/Vpaced. CXR: \"Small right pleural effusion suspected. No other evidence of acute finding. \" 
  
Notable labs: trops 0.08/0.08/0.06; Cr 1.45; k 2.8; Nl mag; proBNP 9k. Nl CBC. \" 
____________________________________________________________________________________________ Notable prior cardiac history:  @ OV 10/2/18: 
Ms Joanna Chavira has a h/o: 
1) CAD (LAD ISELA 2014 for NQMI), Neg PET for ischemia 9/2018. 
2) mixed ischemic/tachycardia mediated CM 4/2014 (EF 20%); EF 45% 11/2017.  Entresto was too expensive. 3) HTN, 4) PAfib (RFA 4/2016 and 1/2017), Recurrrent/persistent afib 2017/2018 5) DM,  
6) SHAYLA (on CPAP),  
7) CKD (Cr 1.5 range).  Aldactone stopped in 2014. 8) [facility] PPM 7/2014 for bradycardia while on therapy for AFib. 
  
11/2017: No CP/STEEN; Back in afib today:  Coreg changed Toprol-XL.  
  
10/2018:  Recent ER visit with epigastric pain; negative evaluation there & seen by Dr. Kenyetta Jaramillo who set up a cardiac PET. The PET shows no ischemia but EF suggested at 16%.  Of note patient does have AFib with accelerated ventricular response.  No recurrence of epigastric discomfort.  No bleeding.  Had a simple trip at home a few weeks ago without injury. 
  
IMPRESSION AND PLAN 
   
01. (I25.10) Atherosclerotic heart disease of native coronary artery without angina pectoris:  No anginal symptoms. Cont asa and stopped plavix with need for anticoagulation. 
  
We discussed the signs and symptoms of unstable angina, myocardial infarction and malignant arrhythmia.  The patient knows to seek immediate medical attention should they occur.  ECG done today 02. (I42.0) Dilated cardiomyopathy:  Mixed ischemic/non-ischemic (tachycardia mediated) CM.  Her ejection fraction is greater than 35% after repeat echo.  The patient's systolic function has been stable. I suspect the PET has a falsely low EF related to her AFib but repeat echo is needed.  2-D w/CFD Echo to be done first available. 03. (I50.42) Chronic combined systolic (congestive) and diastolic (congestive) heart failure:  Resolved exertional symptoms.  (NYHA I)  The patient is compliant with their CHF regimen. 04. (I48.1) Persistent atrial fibrillation:  Management per Dr Fahad Slaughter; currently off amio and on Pradaxa.  Heart rate has been to elevated; increase Toprol  q.day.  Further AFib monitoring/therapy per Dr. Cloretta Peabody. 05. (I13.0) Hyp hrt & chr kdny dis w hrt fail and stg 1-4/unsp chr kdny:  Adequately controlled.  We will see how the patient does with the med changes noted above. 06. (E78.2) Mixed hyperlipidemia:  Lipid labs drawn by PCP. The patient is tolerating lipid lowering therapy well. 07. (N18.3) Chronic kidney disease, stage 3 (moderate):  Cr 1.24/GFR46 11/2015.  Cr 1.32/GFR 43 1/2017.  
08. (R60.0) Localized edema: Roxie Gonzales avoids salt. 09. (E11.69) Type 2 diabetes mellitus with other specified complication:  Lipids & HTN as noted above; DM managed by other MD.  
10. (Z95.0) Presence of cardiac pacemaker:  will continue to be followed in device clinic. 11. (Z79.82) Long term (current) use of aspirin:  This condition is stable. 12. (Z79.01) Long term (current) use of anticoagulants:  This condition is stable.  Indicated for atrial fibrillation.  No bleeding. If she has recurrent falls, she knows to call for re-evaluation of anticoagulation. 13. (Z68.38) Body mass index (BMI) 38.0-38.9, adult:  The patient was instructed on AHA diet and regular exercise.  
  
ORDERS: 
       
1 ECG done today    
2 2-D w/ CFD Echocardiogram first available.    
3 Return office visit with Melissa Little MD in 6 Months.    
4 The patient was instructed on AHA diet and regular exercise.    
  
  
10/2/18 MEDICATION LIST Medication Sig Description  
amlodipine 5 mg tablet take 1 tablet by oral route  every day per pc  
aspirin 81 mg tablet,delayed release take 1 tablet by oral route  every day  
atorvastatin 40 mg tablet take 1 tablet by oral route  every evening  
bumetanide 1 mg tablet 1 in am on tues&thursday 2 tablets on mon wed fridays Calcium 600 600 mg (1,500 mg) tablet daily  
clonazepam 0.5 mg tablet take 1 tablet by oral route  every day LISINOPRIL 20 MG Tablet TAKE 1 TABLET EVERY DAY Lyrica 200 mg capsule take 1 capsule by oral route 2 times every day  
magnesium 250 mg tablet take 1 tablet by oral route  every day  
metoprolol succinate  mg tablet,extended release 24 hr take 1 tablet by oral route  every day  
potassium gluconate 500 mg (83 mg) tablet daily Pradaxa 150 mg capsule take 1 capsule by oral route 2 times every day Premarin 0.625 mg/gram vaginal cream insert 0.5 applicatorful by vaginal route  every day cyclically, 3 weeks on and 1 week off  
venlafaxine 75 mg tablet 2 po bid Vitamin D3 1,000 unit capsule take 1 daily  
  
 ____________________________________________________________________________________________________ CARDIAC HISTORY 
CAD: 
     
1 NSTEMI [95% Proximal LAD, Resolute (ISELA) to the Proximal LAD.] - 4/8/2014  
  
CHF/CM: 
     
1 Mixed ischemic/tachycardic CM. [Nl TSH.] - 4/2014  
2 Ischemic & tachycardic Cardiomyopathy [Echo (EF 0.45) - 06/17/2014, (prev EF 20-30%)] - 6/2014  
  
ARRHYTHMIA: 
     
1 A Fib [DCCV, Plan: amio started; Eliquis with Effient (change eliquis to asa if NSR after 30 days). ] - 4/8/2014  
2 PAF - 8/2010  
3 A Fib, recurrent; and 6/2014. [Had marked sinus bradycardia while on bblocker/dig.] - 5/2014  
4 Sinus Bradycardia. - 6/2/2014  
5 PPM (Devices (Dual Chamber PPM (Jamesetta Saravanan)) - 7/17/2014) - 7/17/2014  
6 PAF [CVR; Eliquis restarted (plavix stopped). ] - 9/2015  
7 A Fib [RFA] - 4/2016  
8 A Fib [RFA] - 1/2017  
  
RISK FACTORS: 
     
1 Diabetes [Diagnosed in 2014] 2 Hypertension 3 Dyslipidemia  
  
  
CARDIOVASCULAR PROCEDURES Procedure Date Results Cardiac PET 09/24/2018 EF 0.16 (16%), physiologic apical thinning with no ischemia Cath 04/08/2014 95% Proximal LAD, Resolute (ISELA) to the Proximal LAD. DCCV 04/08/2014 Initial Rhythm A Fib, Final Rhythm Sinus Devices 07/17/2014 Dual Chamber PPM (Jamesetta Saravanan) Echo 11/28/2017 EF 0.45 (45%), Mild Global Hypo, Mild TR, Mild LVH, Mild MR, Est RVSP 41 mmHg, Normal RV. (probable trileaflet AoV). Echo 10/30/2015 EF 0.45 (45%), Mild LVH, LA Diam 4.1 cm, Est RVSP 35 mmHg, Normal RV. ?bicuspid AoV; (Prob trileaflet on previous ANGELITO). Echo 06/17/2014 EF 0.45 (45%), EF range 45%-50%, Mild LV dilatation. Mild LV systolic dysfunction. ? Bicuspid AoV but prob trileafet on previous ANGELITO. Echo 04/03/2014 EF 0.20 (20%), global HK with lateral sparing; no valve disease. EKG 10/02/2018 Atrial Fib, ; nonspecific T-wave flattening.   
Holter 06/09/2014 No Malignant Arrhythmias, Atrial Fib, No correlation with symptoms, (, [address] Holter 2014 No Malignant Arrhythmias, Atrial Fib, No correlation with symptoms, \"light or heavy chest\" = afib in 60s. HR  (ave 63). ASx pauses (upto 2.8) while asleep. RFA 2017 Indication A Fib, Final Rhythm Sinus RFA   Indication A Fib Sleep Study   OSAS: Moderate ANGELITO 2014 EF 0.35 (35%), No BRAYDON Clot, prob trileaflet AoV. For other plans, see orders. Hospital problem list  
Active Hospital Problems Diagnosis Date Noted  Chronic cholecystitis 2018  Ischemic cardiomyopathy 2018  CAD (coronary artery disease) 2018  Epigastric abdominal pain 2018  S/P placement of cardiac pacemaker 2018  Stage 3 chronic kidney disease ([facility] 2018  Acute on chronic systolic CHF (congestive heart failure) ([facility] 2018  Hypokalemia 10/07/2018  Chronic atrial fibrillation ([facility] [facility] 2016  
 HBP (high blood pressure) 2013 Subjective: Areta  reports Chest pain X none  consistent with:  Non-cardiac CP Atypical CP None now  On going  Anginal CP Dyspnea X none  at rest  with exertion    
    improved  unchanged  worse PND X none  overnight Orthopnea X none  improved  unchanged  worse Presyncope X none  improved  unchanged  worse Ambulated in hallway without symptoms   Yes Ambulated in room without symptoms  Yes Objective:  
 Physical Exam: 
Overall VSSAF;   
Visit Vitals /81 (BP 1 Location: Right arm, BP Patient Position: Sitting) Pulse 80 Temp 98.3 °F (36.8 °C) Resp 16 Ht 5' 9\" (1.753 m) Wt 105.2 kg (232 lb) SpO2 95% BMI 34.26 kg/m² Temp (24hrs), Av.8 °F (36.6 °C), Min:97.3 °F (36.3 °C), Max:98.3 °F (36.8 °C) Patient Vitals for the past 8 hrs: 
 Pulse 18 0815 80  
18 0317 80 Patient Vitals for the past 8 hrs: 
 Resp  
18 0815 16  
18 0317 16 Patient Vitals for the past 8 hrs: 
 BP  
11/17/18 0815 159/81  
11/17/18 0317 146/88 Intake/Output Summary (Last 24 hours) at 11/17/2018 1106 Last data filed at 11/17/2018 0220 Gross per 24 hour Intake 220 ml Output 1600 ml Net -1380 ml General Appearance: Well developed, well nourished, no acute distress. Ears/Nose/Mouth/Throat:   Normal MM; anicteric. JVP: WNL Resp:   clear to auscultation bilaterally. Nl resp effort. Cardiovascular:  RRR, S1, S2 normal, no new murmur. No gallop or rub. Abdomen:   Soft, non-tender, bowel sounds are present. Extremities: Mild edema bilaterally. Skin: 
Neuro: Warm and dry. A/O x3, grossly nonfocal  
cath site intact w/o hematoma or new bruit; distal pulse unchanged  Yes Data Review:    
Telemetry independently reviewed  sinus x chronic afib x V pacing  NSVT  
 
ECG independently reviewed  NSR  afib  no significant changes  NSST-Tw chgs  
 no new ECG provided for review Lab results reviewed as noted below. Current medications reviewed as noted below. No results for input(s): PH, PCO2, PO2 in the last 72 hours. No results for input(s): CPK, CKMB, CKNDX, TROIQ in the last 72 hours. Recent Labs 11/17/18 
0226 11/16/18 
0305 11/15/18 
4318  143 144  
K 3.4* 3.9 3.5  107 108 CO2 32 29 30 BUN 20 16 17 CREA 2.00* 1.70* 1.60* GFRAA 30* 36* 39* * 91 94 CA 8.9 9.0 9.3 ALB  --  3.2* 3.3* WBC 8.4 7.4 6.0 HGB 11.8 11.6 11.8 HCT 38.6 38.9 38.5  209 198 Lab Results Component Value Date/Time Cholesterol, total 104 08/01/2018 10:17 AM  
 HDL Cholesterol 33 (L) 08/01/2018 10:17 AM  
 LDL, calculated 49 08/01/2018 10:17 AM  
 Triglyceride 110 08/01/2018 10:17 AM  
 CHOL/HDL Ratio 4.7 04/03/2014 03:30 PM  
 
Recent Labs 11/16/18 
0305 11/15/18 
9460 SGOT 30 16 * 132* TP 6.2* 6.3* ALB 3.2* 3.3*  
GLOB 3.0 3.0 Recent Labs 11/15/18 
0048 APTT 51.8*  
  
 No components found for: [place] Current Facility-Administered Medications Medication Dose Route Frequency  [START ON 11/18/2018] lisinopril (PRINIVIL, ZESTRIL) tablet 20 mg  20 mg Oral DAILY  lisinopril (PRINIVIL, ZESTRIL) tablet 10 mg  10 mg Oral ONCE  
 bumetanide (BUMEX) tablet 2 mg  2 mg Oral DAILY  dabigatran etexilate (PRADAXA) capsule 150 mg  150 mg Oral BID  sodium chloride (NS) flush 5-10 mL  5-10 mL IntraVENous Q8H  
 sodium chloride (NS) flush 5-10 mL  5-10 mL IntraVENous PRN  
 acetaminophen (TYLENOL) tablet 650 mg  650 mg Oral Q6H PRN  
 fentaNYL citrate (PF) injection 50 mcg  50 mcg IntraVENous Q6H PRN  
 oxyCODONE IR (ROXICODONE) tablet 5 mg  5 mg Oral Q6H PRN  
 ondansetron (ZOFRAN) injection 4 mg  4 mg IntraVENous Q6H PRN  
 docusate sodium (COLACE) capsule 100 mg  100 mg Oral BID  atorvastatin (LIPITOR) tablet 40 mg  40 mg Oral QHS  clonazePAM (KlonoPIN) tablet 0.5 mg  0.5 mg Oral QHS  potassium chloride SR (KLOR-CON 10) tablet 20 mEq  20 mEq Oral DAILY  pregabalin (LYRICA) capsule 200 mg  200 mg Oral BID  venlafaxine-SR (EFFEXOR-XR) capsule 150 mg  150 mg Oral BID WITH MEALS  metoprolol succinate (TOPROL-XL) XL tablet 100 mg  100 mg Oral DAILY  pantoprazole (PROTONIX) tablet 40 mg  40 mg Oral ACB  insulin lispro (HUMALOG) injection   SubCUTAneous AC&HS  
 glucose chewable tablet 16 g  4 Tab Oral PRN  
 dextrose (D50W) injection syrg 12.5-25 g  12.5-25 g IntraVENous PRN  
 glucagon (GLUCAGEN) injection 1 mg  1 mg IntraMUSCular PRN  
 labetalol (NORMODYNE;TRANDATE) injection 10 mg  10 mg IntraVENous Q2H PRN  
 nitroglycerin (NITROBID) 2 % ointment 1 Inch  1 Inch Topical Q6H PRN  
 hydrALAZINE (APRESOLINE) 20 mg/mL injection 10 mg  10 mg IntraVENous Q4H PRN Devonda Harada III, DO

## 2018-11-17 NOTE — PROGRESS NOTES
Problem: Falls - Risk of 
Goal: *Absence of Falls Document Sadie Petersen Fall Risk and appropriate interventions in the flowsheet. Outcome: Progressing Towards Goal 
Fall Risk Interventions: 
Mobility Interventions: Bed/chair exit alarm, Patient to call before getting OOB, PT Consult for mobility concerns Medication Interventions: Bed/chair exit alarm, Patient to call before getting OOB, Teach patient to arise slowly Elimination Interventions: Bed/chair exit alarm, Call light in reach, Toileting schedule/hourly rounds, Patient to call for help with toileting needs History of Falls Interventions: Bed/chair exit alarm, Door open when patient unattended

## 2018-11-17 NOTE — PROGRESS NOTES
Bedside shift change report given to Jerome Tuttle RN (oncoming nurse). Report included the following information SBAR. SHIFT SUMMARY: 
 
 
 
 
 
[address] NURSING NOTE Admission Date 11/12/2018 Admission Diagnosis Acute on chronic systolic CHF (congestive heart failure) ([facility] [address] Hypokalemia Consults IP CONSULT TO GENERAL SURGERY 
IP CONSULT TO CARDIOLOGY Cardiac Monitoring [x] Yes [] No  
  
Purposeful Hourly Rounding [x] Yes   
Ananya Score Total Score: 3 Ananya score 3 or > [x] Bed Alarm [] Avasys [] 1:1 sitter [] Patient refused (Signed refusal form in chart) Preet Score Preet Score: 20 Preet score 14 or < [] PMT consult [] Wound Care consult  
 []  Specialty bed  [] Nutrition consult Influenza Vaccine Received Flu Vaccine for Current Season (usually Sept-March): Yes Oxygen needs? [x] Room air Oxygen @  []1L    []2L    []3L   []4L    []5L   []6L via NC Chronic home O2 use? [] Yes [] No 
Perform O2 challenge test and document in progress note using smartphrase (.Homeoxygen) Last bowel movement Last Bowel Movement Date: 11/10/18 Urinary Catheter LDAs Peripheral IV 11/12/18 Right Antecubital (Active) Site Assessment Clean, dry, & intact 11/16/2018  1:17 PM  
Phlebitis Assessment 0 11/16/2018  1:17 PM  
Infiltration Assessment 0 11/16/2018  1:17 PM  
Dressing Status Clean, dry, & intact 11/16/2018  1:17 PM  
Dressing Type Transparent;Tape 11/16/2018  1:17 PM  
Hub Color/Line Status Pink;Flushed 11/16/2018  1:17 PM  
                  
  
Readmission Risk Assessment Tool Score High Risk 35 Total Score 3 Has Seen PCP in Last 6 Months (Yes=3, No=0)  
 4 IP Visits Last 12 Months (1-3=4, 4=9, >4=11) 5 Pt. Coverage (Medicare=5 , Medicaid, or Self-Pay=4) 21 Charlson Comorbidity Score (Age + Comorbid Conditions) Criteria that do not apply:  
 . Living with Significant Other. Assisted Living. LTAC. SNF. or  
Rehab Patient Length of Stay (>5 days = 3) Expected Length of Stay 5d 9h  
Actual Length of Stay 4

## 2018-11-18 VITALS
BODY MASS INDEX: 34.11 KG/M2 | HEIGHT: 69 IN | TEMPERATURE: 97.8 F | HEART RATE: 80 BPM | WEIGHT: 230.3 LBS | OXYGEN SATURATION: 95 % | RESPIRATION RATE: 18 BRPM | SYSTOLIC BLOOD PRESSURE: 130 MMHG | DIASTOLIC BLOOD PRESSURE: 78 MMHG

## 2018-11-18 LAB
ANION GAP SERPL CALC-SCNC: 9 MMOL/L (ref 5–15)
BUN SERPL-MCNC: 21 MG/DL (ref 6–20)
BUN/CREAT SERPL: 12 (ref 12–20)
CALCIUM SERPL-MCNC: 8.6 MG/DL (ref 8.5–10.1)
CHLORIDE SERPL-SCNC: 106 MMOL/L (ref 97–108)
CO2 SERPL-SCNC: 27 MMOL/L (ref 21–32)
CREAT SERPL-MCNC: 1.74 MG/DL (ref 0.55–1.02)
GLUCOSE BLD STRIP.AUTO-MCNC: 102 MG/DL (ref 65–100)
GLUCOSE BLD STRIP.AUTO-MCNC: 94 MG/DL (ref 65–100)
GLUCOSE SERPL-MCNC: 99 MG/DL (ref 65–100)
POTASSIUM SERPL-SCNC: 3.5 MMOL/L (ref 3.5–5.1)
SERVICE CMNT-IMP: ABNORMAL
SERVICE CMNT-IMP: NORMAL
SODIUM SERPL-SCNC: 142 MMOL/L (ref 136–145)

## 2018-11-18 PROCEDURE — 36415 COLL VENOUS BLD VENIPUNCTURE: CPT

## 2018-11-18 PROCEDURE — 94760 N-INVAS EAR/PLS OXIMETRY 1: CPT

## 2018-11-18 PROCEDURE — 74011250637 HC RX REV CODE- 250/637: Performed by: SURGERY

## 2018-11-18 PROCEDURE — 74011250637 HC RX REV CODE- 250/637: Performed by: INTERNAL MEDICINE

## 2018-11-18 PROCEDURE — 80048 BASIC METABOLIC PNL TOTAL CA: CPT

## 2018-11-18 PROCEDURE — 74011250637 HC RX REV CODE- 250/637: Performed by: HOSPITALIST

## 2018-11-18 PROCEDURE — 82962 GLUCOSE BLOOD TEST: CPT

## 2018-11-18 RX ORDER — PANTOPRAZOLE SODIUM 40 MG/1
40 TABLET, DELAYED RELEASE ORAL
Qty: 30 TAB | Refills: 2 | Status: SHIPPED | OUTPATIENT
Start: 2018-11-19 | End: 2018-11-20 | Stop reason: SDUPTHER

## 2018-11-18 RX ORDER — AMLODIPINE BESYLATE 2.5 MG/1
2.5 TABLET ORAL DAILY
Qty: 30 TAB | Status: SHIPPED | OUTPATIENT
Start: 2018-11-19 | End: 2018-11-20 | Stop reason: SDUPTHER

## 2018-11-18 RX ORDER — LISINOPRIL 10 MG/1
10 TABLET ORAL DAILY
Qty: 30 TAB | Status: SHIPPED | OUTPATIENT
Start: 2018-11-19 | End: 2019-04-27 | Stop reason: DRUGHIGH

## 2018-11-18 RX ORDER — METOPROLOL SUCCINATE 100 MG/1
100 TABLET, EXTENDED RELEASE ORAL DAILY
Qty: 30 TAB | Status: SHIPPED | OUTPATIENT
Start: 2018-11-19 | End: 2021-03-08 | Stop reason: ALTCHOICE

## 2018-11-18 RX ADMIN — AMLODIPINE BESYLATE 2.5 MG: 5 TABLET ORAL at 10:08

## 2018-11-18 RX ADMIN — VENLAFAXINE HYDROCHLORIDE 150 MG: 150 CAPSULE, EXTENDED RELEASE ORAL at 10:09

## 2018-11-18 RX ADMIN — DOCUSATE SODIUM 100 MG: 100 CAPSULE, LIQUID FILLED ORAL at 10:09

## 2018-11-18 RX ADMIN — DABIGATRAN ETEXILATE MESYLATE 150 MG: 150 CAPSULE ORAL at 10:08

## 2018-11-18 RX ADMIN — PANTOPRAZOLE SODIUM 40 MG: 40 TABLET, DELAYED RELEASE ORAL at 10:09

## 2018-11-18 RX ADMIN — LISINOPRIL 10 MG: 5 TABLET ORAL at 10:09

## 2018-11-18 RX ADMIN — POTASSIUM CHLORIDE 20 MEQ: 750 TABLET, FILM COATED, EXTENDED RELEASE ORAL at 10:09

## 2018-11-18 RX ADMIN — PREGABALIN 200 MG: 150 CAPSULE ORAL at 10:09

## 2018-11-18 RX ADMIN — BUMETANIDE 2 MG: 1 TABLET ORAL at 10:08

## 2018-11-18 RX ADMIN — METOPROLOL SUCCINATE 100 MG: 50 TABLET, EXTENDED RELEASE ORAL at 10:09

## 2018-11-18 NOTE — PROGRESS NOTES
Cardiology Progress Note 11/18/2018     Admit Date: 11/12/2018 Admit Diagnosis: Acute on chronic systolic CHF (congestive heart failure) ([address] Hypokalemia  CC: none currently Cardiac Assessment/Plan:  
Ms Katharina Francis has a h/o: 
1) CAD (LAD ISELA 2014 for NQMI), Neg PET for ischemia 9/2018. 
2) mixed ischemic/tachycardia mediated CM 4/2014 (EF 20%); EF 45% 11/2017.  Entresto was too expensive. *Low EF 10/9/18 (15-20%): Med Rx for now. 3) HTN, On lower dose meds last months admit due to low BPs after EP procedure: increased @ rehab. 4) PAfib (RFA 4/2016 and 1/2017), Recurrrent/persistent afib 2017/2018 *Chronic Afib now; BiV device in 10/18/18; AV node abation 10/19/18:  Off amiodarone; on Xarelto. 5) DM,  
6) SHAYLA (on CPAP),  
7) CKD (Cr 1.5 range).  Aldactone stopped in 2014.  
  
Rec: non-cardiac CP; Med Rx; Increase lisinopril to 10 qday; cont toprol XL 50 (increase as needed; prev on lisinopril 20/toprol  every day and norvasc as below). Restart pradaxa (instead of current hep gtt) when able. 
  
ID/GI, SHAYLA, DM, Dispo: per primary team.  
For other plans, see orders. 11/13: Improved HTN; No anginal CP (less reproducible chest wall pain); no MI. Not vol overloaded; stable chronic afib; Remains on hep gtt rather than Pradaxa. HIDA scan pending; If needs surgery, moderate-to-high risk due to CM; No obviously modifiable risk factors are noted (other than better BP control). If she's having a \"low bleeding-risk procedure,\" pradaxa should be held for 2 days (GFR 3-50); If the procedure is considered \"high bleeding-risk,\" the pradaxa should be held for 4 days. 11/14: + HIDA scan reported; plan for surgery noted; Cardiac risk as above. No new cardiac recs; BP needs better control; add prn NTG paste for jasmina-op; increase ACEi after surgery if needed. Cont bblocker is important (IV if NPO). 11/15: BPs improved; clinically stable; no new recs. 11/16: doing well post op; BPs improved (increase lisinopril if needed); No new recs. 11/17:  Doing well, NAEO.  sCr up to 2. BP higher. Will hold on increasing ACEi at this time; start low dose norvasc 2.5mg. Cont pradaxa and Toprol XL 100mg. Cont lisinopril 10mg. 11/18:  Doing well, NAEO.  sCR better. BP better. Up walking halls without issue. Continue current therapies. Follow w/ Dr. Quinones Cough in the office. 
_________________________________________________________________________________________ As previously noted: \"On d/c last month: \"To be off amio; D/C cardiac meds: lisinopril 2.5 qday; toprol XL 25 qday; pradaxa 150 bid; bumex 1 qday; KCL 10. Xarelto qday. ICD check 2 weeks; OV with me in 1 month. OK for rehab from cardiac standpoint. \" 
  
In rehab, she reports lisinopril increased to 5 qday and toprol XL to 50. 
____________________________________________________________________ 
  
The patient reports increased BPs (>200s/120), worse dyspnea; reproducible chest wall pain/abd pain/ 
No PND, orthopnea, palpitations, pre-syncope, syncope, new peripheral edema. Current c/o reproducible chest wall/abd pain. 
  
ECG: afib/Vpaced. Tele: afib/Vpaced. CXR: \"Small right pleural effusion suspected. No other evidence of acute finding. \" 
  
Notable labs: trops 0.08/0.08/0.06; Cr 1.45; k 2.8; Nl mag; proBNP 9k. Nl CBC. \" 
____________________________________________________________________________________________ Notable prior cardiac history:  @ OV 10/2/18: 
Ms Tano Gonzalez has a h/o: 
1) CAD (LAD ISELA 2014 for NQMI), Neg PET for ischemia 9/2018. 
2) mixed ischemic/tachycardia mediated CM 4/2014 (EF 20%); EF 45% 11/2017.  Entresto was too expensive. 3) HTN, 4) PAfib (RFA 4/2016 and 1/2017), Recurrrent/persistent afib 2017/2018 5) DM,  
6) SHAYLA (on CPAP),  
7) CKD (Cr 1.5 range).  Aldactone stopped in 2014. 8) [facility] PPM 7/2014 for bradycardia while on therapy for AFib.  
  
 11/2017: No CP/STEEN; Back in afib today:  Coreg changed Toprol-XL.   
10/2018:  Recent ER visit with epigastric pain; negative evaluation there & seen by Dr. Swati Roger who set up a cardiac PET. The PET shows no ischemia but EF suggested at 16%.  Of note patient does have AFib with accelerated ventricular response.  No recurrence of epigastric discomfort.  No bleeding.  Had a simple trip at home a few weeks ago without injury. 
  
IMPRESSION AND PLAN 
   
01. (I25.10) Atherosclerotic heart disease of native coronary artery without angina pectoris:  No anginal symptoms. Cont asa and stopped plavix with need for anticoagulation. 
  
We discussed the signs and symptoms of unstable angina, myocardial infarction and malignant arrhythmia.  The patient knows to seek immediate medical attention should they occur.  ECG done today 02. (I42.0) Dilated cardiomyopathy:  Mixed ischemic/non-ischemic (tachycardia mediated) CM.  Her ejection fraction is greater than 35% after repeat echo.  The patient's systolic function has been stable. I suspect the PET has a falsely low EF related to her AFib but repeat echo is needed.  2-D w/CFD Echo to be done first available. 03. (I50.42) Chronic combined systolic (congestive) and diastolic (congestive) heart failure:  Resolved exertional symptoms.  (NYHA I)  The patient is compliant with their CHF regimen. 04. (I48.1) Persistent atrial fibrillation:  Management per Dr Laine Forbes; currently off amio and on Pradaxa.  Heart rate has been to elevated; increase Toprol  q.day.  Further AFib monitoring/therapy per Dr. Vianney Rushing. 05. (I13.0) Hyp hrt & chr kdny dis w hrt fail and stg 1-4/unsp chr kdny:  Adequately controlled.  We will see how the patient does with the med changes noted above. 06. (E78.2) Mixed hyperlipidemia:  Lipid labs drawn by PCP. The patient is tolerating lipid lowering therapy well.   
07. (N18.3) Chronic kidney disease, stage 3 (moderate):  Cr 1.24/GFR46 11/2015.  Cr 1.32/GFR 43 1/2017.  
08. (R60.0) Localized edema: Nestor Tian avoids salt. 09. (E11.69) Type 2 diabetes mellitus with other specified complication:  Lipids & HTN as noted above; DM managed by other MD.  
10. (Z95.0) Presence of cardiac pacemaker:  will continue to be followed in device clinic. 11. (Z79.82) Long term (current) use of aspirin:  This condition is stable. 12. (Z79.01) Long term (current) use of anticoagulants:  This condition is stable.  Indicated for atrial fibrillation.  No bleeding. If she has recurrent falls, she knows to call for re-evaluation of anticoagulation. 13. (Z68.38) Body mass index (BMI) 38.0-38.9, adult:  The patient was instructed on AHA diet and regular exercise.  
  
ORDERS: 
       
1 ECG done today    
2 2-D w/ CFD Echocardiogram first available.    
3 Return office visit with Reyna Little MD in 6 Months.    
4 The patient was instructed on AHA diet and regular exercise.    
  
  
10/2/18 MEDICATION LIST Medication Sig Description  
amlodipine 5 mg tablet take 1 tablet by oral route  every day per pc  
aspirin 81 mg tablet,delayed release take 1 tablet by oral route  every day  
atorvastatin 40 mg tablet take 1 tablet by oral route  every evening  
bumetanide 1 mg tablet 1 in am on tues&thursday 2 tablets on mon wed fridays Calcium 600 600 mg (1,500 mg) tablet daily  
clonazepam 0.5 mg tablet take 1 tablet by oral route  every day LISINOPRIL 20 MG Tablet TAKE 1 TABLET EVERY DAY Lyrica 200 mg capsule take 1 capsule by oral route 2 times every day  
magnesium 250 mg tablet take 1 tablet by oral route  every day  
metoprolol succinate  mg tablet,extended release 24 hr take 1 tablet by oral route  every day  
potassium gluconate 500 mg (83 mg) tablet daily Pradaxa 150 mg capsule take 1 capsule by oral route 2 times every day Premarin 0.625 mg/gram vaginal cream insert 0.5 applicatorful by vaginal route  every day cyclically, 3 weeks on and 1 week off  
venlafaxine 75 mg tablet 2 po bid Vitamin D3 1,000 unit capsule take 1 daily  
  
____________________________________________________________________________________________________ CARDIAC HISTORY 
CAD: 
     
1 NSTEMI [95% Proximal LAD, Resolute (ISELA) to the Proximal LAD.] - 4/8/2014  
  
CHF/CM: 
     
1 Mixed ischemic/tachycardic CM. [Nl TSH.] - 4/2014  
2 Ischemic & tachycardic Cardiomyopathy [Echo (EF 0.45) - 06/17/2014, (prev EF 20-30%)] - 6/2014  
  
ARRHYTHMIA: 
     
1 A Fib [DCCV, Plan: amio started; Eliquis with Effient (change eliquis to asa if NSR after 30 days). ] - 4/8/2014  
2 PAF - 8/2010  
3 A Fib, recurrent; and 6/2014. [Had marked sinus bradycardia while on bblocker/dig.] - 5/2014  
4 Sinus Bradycardia. - 6/2/2014  
5 PPM (Devices (Dual Chamber PPM (Achilles Osler)) - 7/17/2014) - 7/17/2014  
6 PAF [CVR; Eliquis restarted (plavix stopped). ] - 9/2015  
7 A Fib [RFA] - 4/2016  
8 A Fib [RFA] - 1/2017  
  
RISK FACTORS: 
     
1 Diabetes [Diagnosed in 2014] 2 Hypertension 3 Dyslipidemia  
  
  
CARDIOVASCULAR PROCEDURES Procedure Date Results Cardiac PET 09/24/2018 EF 0.16 (16%), physiologic apical thinning with no ischemia Cath 04/08/2014 95% Proximal LAD, Resolute (ISELA) to the Proximal LAD. DCCV 04/08/2014 Initial Rhythm A Fib, Final Rhythm Sinus Devices 07/17/2014 Dual Chamber PPM (Achilles Osler) Echo 11/28/2017 EF 0.45 (45%), Mild Global Hypo, Mild TR, Mild LVH, Mild MR, Est RVSP 41 mmHg, Normal RV. (probable trileaflet AoV). Echo 10/30/2015 EF 0.45 (45%), Mild LVH, LA Diam 4.1 cm, Est RVSP 35 mmHg, Normal RV. ?bicuspid AoV; (Prob trileaflet on previous ANGELITO). Echo 06/17/2014 EF 0.45 (45%), EF range 45%-50%, Mild LV dilatation. Mild LV systolic dysfunction. ? Bicuspid AoV but prob trileafet on previous ANGELITO. Echo 04/03/2014 EF 0.20 (20%), global HK with lateral sparing; no valve disease. EKG 10/02/2018 Atrial Fib, ; nonspecific T-wave flattening. Holter 2014 No Malignant Arrhythmias, Atrial Fib, No correlation with symptoms, (, [address] Holter 2014 No Malignant Arrhythmias, Atrial Fib, No correlation with symptoms, \"light or heavy chest\" = afib in 60s. HR  (ave 63). ASx pauses (upto 2.8) while asleep. RFA 2017 Indication A Fib, Final Rhythm Sinus RFA   Indication A Fib Sleep Study   OSAS: Moderate ANGELITO 2014 EF 0.35 (35%), No BRAYDON Clot, prob trileaflet AoV. For other plans, see orders. Hospital problem list  
Active Hospital Problems Diagnosis Date Noted  Chronic cholecystitis 2018  Ischemic cardiomyopathy 2018  CAD (coronary artery disease) 2018  Epigastric abdominal pain 2018  S/P placement of cardiac pacemaker 2018  Stage 3 chronic kidney disease ([facility] [address] 2018  Acute on chronic systolic CHF (congestive heart failure) ([facility] [address] 2018  Hypokalemia 10/07/2018  Chronic atrial fibrillation ([facility] [address] 2016  
 HBP (high blood pressure) 2013 Subjective: Sarina  reports Chest pain X none  consistent with:  Non-cardiac CP Atypical CP None now  On going  Anginal CP Dyspnea X none  at rest  with exertion    
    improved  unchanged  worse PND X none  overnight Orthopnea X none  improved  unchanged  worse Presyncope X none  improved  unchanged  worse Ambulated in hallway without symptoms   Yes Ambulated in room without symptoms  Yes Objective:  
 Physical Exam: 
Overall VSSAF;   
Visit Vitals /86 (BP 1 Location: Left arm, BP Patient Position: Sitting) Pulse 80 Temp 98 °F (36.7 °C) Resp 16 Ht 5' 9\" (1.753 m) Wt 104.5 kg (230 lb 4.8 oz) SpO2 95% BMI 34.01 kg/m² Temp (24hrs), Av.3 °F (36.8 °C), Min:97.3 °F (36.3 °C), Max:99.6 °F (37.6 °C) Patient Vitals for the past 8 hrs: 
 Pulse 11/18/18 0744 80  
11/18/18 0334 80 Patient Vitals for the past 8 hrs: 
 Resp  
11/18/18 0744 16  
11/18/18 0334 18 Patient Vitals for the past 8 hrs: 
 BP  
11/18/18 0744 145/86  
11/18/18 0334 126/86 Intake/Output Summary (Last 24 hours) at 11/18/2018 0920 Last data filed at 11/18/2018 4011 Gross per 24 hour Intake 120 ml Output 2350 ml Net -2230 ml General Appearance: Well developed, well nourished, no acute distress. Ears/Nose/Mouth/Throat:   Normal MM; anicteric. JVP: WNL Resp:   clear to auscultation bilaterally. Nl resp effort. Cardiovascular:  RRR, S1, S2 normal, no new murmur. No gallop or rub. Abdomen:   Soft, non-tender, bowel sounds are present. Extremities: Mild edema bilaterally. Skin: 
Neuro: Warm and dry. A/O x3, grossly nonfocal  
cath site intact w/o hematoma or new bruit; distal pulse unchanged  Yes Data Review:    
Telemetry independently reviewed  sinus x chronic afib x V pacing  NSVT  
 
ECG independently reviewed  NSR  afib  no significant changes  NSST-Tw chgs  
 no new ECG provided for review Lab results reviewed as noted below. Current medications reviewed as noted below. No results for input(s): PH, PCO2, PO2 in the last 72 hours. No results for input(s): CPK, CKMB, CKNDX, TROIQ in the last 72 hours. Recent Labs 11/18/18 
0224 11/17/18 
0226 11/16/18 
0305  144 143  
K 3.5 3.4* 3.9  105 107 CO2 27 32 29 BUN 21* 20 16 CREA 1.74* 2.00* 1.70* GFRAA 35* 30* 36* GLU 99 101* 91  
CA 8.6 8.9 9.0 ALB  --   --  3.2* WBC  --  8.4 7.4 HGB  --  11.8 11.6 HCT  --  38.6 38.9 PLT  --  207 209 Lab Results Component Value Date/Time Cholesterol, total 104 08/01/2018 10:17 AM  
 HDL Cholesterol 33 (L) 08/01/2018 10:17 AM  
 LDL, calculated 49 08/01/2018 10:17 AM  
 Triglyceride 110 08/01/2018 10:17 AM  
 CHOL/HDL Ratio 4.7 04/03/2014 03:30 PM  
 
Recent Labs 11/16/18 
0305 SGOT 30  
* TP 6.2* ALB 3.2*  
GLOB 3.0 No results for input(s): INR, PTP, APTT in the last 72 hours. No lab exists for component: INREXT, INREXT No components found for: [place] Current Facility-Administered Medications Medication Dose Route Frequency  lisinopril (PRINIVIL, ZESTRIL) tablet 10 mg  10 mg Oral DAILY  amLODIPine (NORVASC) tablet 2.5 mg  2.5 mg Oral DAILY  bumetanide (BUMEX) tablet 2 mg  2 mg Oral DAILY  dabigatran etexilate (PRADAXA) capsule 150 mg  150 mg Oral BID  sodium chloride (NS) flush 5-10 mL  5-10 mL IntraVENous Q8H  
 sodium chloride (NS) flush 5-10 mL  5-10 mL IntraVENous PRN  
 acetaminophen (TYLENOL) tablet 650 mg  650 mg Oral Q6H PRN  
 fentaNYL citrate (PF) injection 50 mcg  50 mcg IntraVENous Q6H PRN  
 oxyCODONE IR (ROXICODONE) tablet 5 mg  5 mg Oral Q6H PRN  
 ondansetron (ZOFRAN) injection 4 mg  4 mg IntraVENous Q6H PRN  
 docusate sodium (COLACE) capsule 100 mg  100 mg Oral BID  atorvastatin (LIPITOR) tablet 40 mg  40 mg Oral QHS  clonazePAM (KlonoPIN) tablet 0.5 mg  0.5 mg Oral QHS  potassium chloride SR (KLOR-CON 10) tablet 20 mEq  20 mEq Oral DAILY  pregabalin (LYRICA) capsule 200 mg  200 mg Oral BID  venlafaxine-SR (EFFEXOR-XR) capsule 150 mg  150 mg Oral BID WITH MEALS  metoprolol succinate (TOPROL-XL) XL tablet 100 mg  100 mg Oral DAILY  pantoprazole (PROTONIX) tablet 40 mg  40 mg Oral ACB  insulin lispro (HUMALOG) injection   SubCUTAneous AC&HS  
 glucose chewable tablet 16 g  4 Tab Oral PRN  
 dextrose (D50W) injection syrg 12.5-25 g  12.5-25 g IntraVENous PRN  
 glucagon (GLUCAGEN) injection 1 mg  1 mg IntraMUSCular PRN  
 labetalol (NORMODYNE;TRANDATE) injection 10 mg  10 mg IntraVENous Q2H PRN  
 nitroglycerin (NITROBID) 2 % ointment 1 Inch  1 Inch Topical Q6H PRN  
 hydrALAZINE (APRESOLINE) 20 mg/mL injection 10 mg  10 mg IntraVENous Q4H PRN Johanna Martinezestsedrick III, DO

## 2018-11-18 NOTE — DISCHARGE INSTRUCTIONS
[phone]  [phone]      Patient Discharge Instructions    Aline Lucia / [id number] : 1950    Admitted 2018 Discharged: 2018     Principal Problem:    Acute on chronic systolic CHF (congestive heart failure) ([facility] (2018)    Active Problems:    HBP (high blood pressure) (2013)      Chronic atrial fibrillation ([facility] (2016)      Hypokalemia (10/7/2018)      Ischemic cardiomyopathy (2018)      CAD (coronary artery disease) (2018)      Epigastric abdominal pain (2018)      S/P placement of cardiac pacemaker (2018)      Stage 3 chronic kidney disease ([facility] (2018)      Chronic cholecystitis (2018)          Allergies   Allergen Reactions    Sulfa (Sulfonamide Antibiotics) Swelling    Amoxicillin Swelling       · It is important that you take the medication exactly as they are prescribed. · Do not take other medications without consulting your doctor. What to do at Next Level of Care    Disposition:  home    Recommended diet: Cardiac    Recommended activity: Usual          Information obtained by :  I understand that if any problems occur once I am at home I am to contact my physician. I understand and acknowledge receipt of the instructions indicated above.                                                                                                                                            Physician's or R.N.'s Signature                                                                  Date/Time                                                                                                                                              Patient or Representative Signature                                                          Date/Time

## 2018-11-18 NOTE — PROGRESS NOTES
7:30 AM Received bedside verbal report from DIAN Smyth, 
 
 
12:40 PM Reviewed discharge instructions and medications with the patient and her . Ample time was given for any questions or concerns. Removed tele and PIV. patient stable for discharge.

## 2018-11-18 NOTE — PROGRESS NOTES
Problem: Risk for Spread of Infection Goal: Prevent transmission of infectious organism to others Prevent the transmission of infectious organisms to other patients, staff members, and visitors.  
Outcome: Progressing Towards Goal 
Hand washing, using PPE

## 2018-11-18 NOTE — PROGRESS NOTES
Admit Date: 2018 POD 3 Day Post-Op Procedure:  Procedure(s): LAPAROSCOPIC CHOLECYSTECTOMY Subjective:  
 
Patient has no complaints. bahman po well. Objective:  
 
Blood pressure 145/86, pulse 80, temperature 98 °F (36.7 °C), resp. rate 16, height 5' 9\" (1.753 m), weight 230 lb 4.8 oz (104.5 kg), SpO2 95 %. Temp (24hrs), Av.3 °F (36.8 °C), Min:97.3 °F (36.3 °C), Max:99.6 °F (37.6 °C) Physical Exam:  GENERAL: alert, cooperative, no distress, appears stated age, LUNG: clear to auscultation bilaterally, HEART: regular rate and rhythm, ABDOMEN: soft, non-tender. Bowel sounds normal. No masses,  no organomegaly, wounds c/d/i, EXTREMITIES:  extremities normal, atraumatic, no cyanosis or edema Labs:  
Recent Results (from the past 24 hour(s)) GLUCOSE, POC Collection Time: 18 12:38 PM  
Result Value Ref Range Glucose (POC) 120 (H) 65 - 100 mg/dL Performed by Janell Screen (PCT) GLUCOSE, POC Collection Time: 18  5:00 PM  
Result Value Ref Range Glucose (POC) 116 (H) 65 - 100 mg/dL Performed by Janell Screen (PCT) GLUCOSE, POC Collection Time: 18  8:47 PM  
Result Value Ref Range Glucose (POC) 115 (H) 65 - 100 mg/dL Performed by Columba Unocoinds METABOLIC PANEL, BASIC Collection Time: 18  2:24 AM  
Result Value Ref Range Sodium 142 136 - 145 mmol/L Potassium 3.5 3.5 - 5.1 mmol/L Chloride 106 97 - 108 mmol/L  
 CO2 27 21 - 32 mmol/L Anion gap 9 5 - 15 mmol/L Glucose 99 65 - 100 mg/dL BUN 21 (H) 6 - 20 MG/DL Creatinine 1.74 (H) 0.55 - 1.02 MG/DL  
 BUN/Creatinine ratio 12 12 - 20 GFR est AA 35 (L) >60 ml/min/1.73m2 GFR est non-AA 29 (L) >60 ml/min/1.73m2 Calcium 8.6 8.5 - 10.1 MG/DL  
GLUCOSE, POC Collection Time: 18  7:47 AM  
Result Value Ref Range Glucose (POC) 102 (H) 65 - 100 mg/dL Performed by Yelena Woody  (PCT) Data Review images and reports reviewed Assessment: Principal Problem: 
  Acute on chronic systolic CHF (congestive heart failure) ([facility] [address] (11/12/2018) Active Problems: 
  HBP (high blood pressure) (2/22/2013) Chronic atrial fibrillation ([address] (6/5/2016) Hypokalemia (10/7/2018) Ischemic cardiomyopathy (11/13/2018) CAD (coronary artery disease) (11/13/2018) Epigastric abdominal pain (11/13/2018) S/P placement of cardiac pacemaker (11/13/2018) Stage 3 chronic kidney disease ([facility] [address] (11/13/2018) Chronic cholecystitis (11/16/2018) Plan/Recommendations/Medical Decision Making:  
 
Continue present treatment GI lite diet Doing well s/p lap cholecystectomy Complex pt with significant cardiac disease Suitable for d/c at any time from surgical standpoint. F/u with me in 2 weeks. Back on Pradakameron Sanchez. Candi Zhao MD, [facility] [address] [facility]

## 2018-11-19 ENCOUNTER — PATIENT OUTREACH (OUTPATIENT)
Dept: INTERNAL MEDICINE CLINIC | Age: 68
End: 2018-11-19

## 2018-11-19 ENCOUNTER — HOME HEALTH ADMISSION (OUTPATIENT)
Dept: HOME HEALTH SERVICES | Facility: HOME HEALTH | Age: 68
End: 2018-11-19

## 2018-11-19 NOTE — PROGRESS NOTES
Chart review. Patient discharged on 11/18/2018. Admitted for CHF. Qualifying dx for [facility]. This CM attempted to call patient to inform/review. No answer. No voicemail option Landline. 2nd attempt to contact cell - left message on voicemail to return call. Cm placed order for [facility] and submitted to [facility]. Medardo OHOD/Lizzie Siddiqui CM Supervisor informed. 11/20/2018  Late entry for 11/19/2018 [facility] informed this CM patient was contacted and refused H2H. Jose Mclaughlin RN CM Ext [phone]

## 2018-11-19 NOTE — DISCHARGE SUMMARY
[address]  MR#: [id number]  : 1950  ACCOUNT #: [de-identified]   ADMIT DATE: 2018  DISCHARGE DATE: 2018    FINAL DIAGNOSES:    1. Acute on chronic congestive heart failure. 2.  Hypertension, uncontrolled. 3.  Chronic atrial fibrillation. 4.  Acute cholecystitis treated with cholecystectomy. 5.  Epigastric pain secondary to number 4.    6.  Status post cardiac pacemaker placement. 7.  Chronic kidney disease, stage III. CONSULTATIONS:  Cardiology as well as general surgery, Dr. Storm Wayne. PROCEDURES:  Laparoscopic cholecystectomy. COMPLICATIONS:  None. For details of admission history and physical, please see admit note. HOSPITAL COURSE:  Briefly, patient is a 72-year-old white female with the above noted chronic problems, who presented to the emergency room with abdominal pain and was admitted with acute on chronic systolic heart failure as well as the abdominal pain which subsequently turned to be related to cholecystitis. Her heart failure was controlled and she was seen in consultation by Dr. Jonah Acevedo. She was taken to the operating room on 11/15 at which time laparoscopic cholecystectomy was performed without incident. Postoperatively, she had an uncomplicated course other than some issues with her blood pressure, which were controlled by medication adjustment, and at this point, she is eating her usual diet and not requiring any pain medications. All drains have been removed, and her hypertension is controlled, and heart failure is compensated, and it is felt that the maximum hospital benefit has been achieved.       DISCHARGE MEDICATIONS:  Will consist of chronic medications of Tylenol 500 mg take 2 tablets every 6 hours as needed, aspirin 81 mg daily, Lipitor 40 mg daily, Bumex 2 mg daily, vitamin D3 400 units daily, Klonopin 0.5 one tablet at nighttime, Premarin 0.625 vaginal cream apply 2 days weekly, Pradaxa 150 mg b.i.d., lidocaine topical cream p.r.n. for knee pain, Lyrica 200 mg b.i.d., nasal saline 1 spray both nostrils 2 times daily, Effexor- mg twice daily. New medication will be Norvasc 2.5 mg daily. Changed medication dosages will be lisinopril was increased from 2.5 mg daily to 10 mg daily and Toprol-XL was increased from 50 mg daily up to 100 mg daily. She will have follow up with Dr. Mik Waller in 48 hours.       DISPOSITION:          MD JAQUELINE Recinos/  D: 11/18/2018 10:34     T: 11/19/2018 08:06  JOB #: [id number]  CC: Edith Virgen MD  CC: [facility]  CC: [id number] [id number]

## 2018-11-19 NOTE — PROGRESS NOTES
ERWIN Dickey notified of patient's d/c from [facility] and need for f/u appt and poss HHC or H2H needed orders and that CM would contact in the AM. / Rolando Khan of Case Management

## 2018-11-19 NOTE — PROGRESS NOTES
Hospital Discharge Follow-Up Date/Time:  2018 9:35 AM 
 
Patient was admitted to [facility] on 18 and discharged on 18 for acute on chronic systolic CHF and abdominal pain. The physician discharge summary was available at the time of outreach. Patient was contacted within one business days of discharge. Top Challenges reviewed with the provider Patient needs 90 day supply Rx. for amlodipine, protonix, and potassium Method of communication with provider :chart routing Inpatient RRAT score: 36 Was this a readmission? yes Patient stated reason for the readmission: abdominal pain Nurse Navigator (NN) contacted the patient by telephone to perform post hospital discharge assessment. Verified name and  with patient as identifiers. Provided introduction to self, and explanation of the Nurse Navigator role. Reviewed discharge instructions and red flags with patient who verbalized understanding. Patient given an opportunity to ask questions and does not have any further questions or concerns at this time. The patient agrees to contact the PCP office for questions related to their healthcare. NN provided contact information for future reference. Disease Specific:   CHF Summary of patient's top problems: 1. Readmission within 30 days for acute on chronic CHF and abdominal pain. CHF well controlled and laparoscopic cholecystectomy done for cholecystitis. Patient's weight is down 2 lb as of today and she continues to monitor weight daily. Home Health orders at discharge: none [address]: n/a Date of initial visit: n/a Durable Medical Equipment ordered/company: n/a Durable Medical Equipment received: n/a Barriers to care? none Advance Care Planning:  
Does patient have an Advance Directive:  reviewed and current Medication(s):  
New Medications at Discharge: amlodipine, protonix Changed Medications at Discharge: lisinopril, metoprolol Discontinued Medications at Discharge: n/a Medication reconciliation was performed with patient, who verbalizes understanding of administration of home medications. There were no barriers to obtaining medications identified at this time. Referral to Pharm D needed: no  
 
Current Outpatient Medications Medication Sig  
 lisinopril (PRINIVIL, ZESTRIL) 10 mg tablet Take 1 Tab by mouth daily.  metoprolol succinate (TOPROL-XL) 100 mg tablet Take 1 Tab by mouth daily.  amLODIPine (NORVASC) 2.5 mg tablet Take 1 Tab by mouth daily.  pantoprazole (PROTONIX) 40 mg tablet Take 1 Tab by mouth Daily (before breakfast).  bumetanide (BUMEX) 1 mg tablet Take 2 mg by mouth daily.  acetaminophen (TYLENOL EXTRA STRENGTH) 500 mg tablet Take 1,000 mg by mouth every eight (8) hours as needed for Pain (Headache).  lidocaine (ASPERCREME, LIDOCAINE,) 4 % topical cream Apply  to affected area two (2) times daily as needed for Pain (Knee pain).  sodium chloride (AYR SALINE) 0.65 % nasal squeeze bottle 1 [place] by Both Nostrils route two (2) times a day.  conjugated estrogens (PREMARIN) 0.625 mg/gram vaginal cream Insert 0.5 g into vagina two (2) days a week. Tuesdays and Thursdays  clonazePAM (KLONOPIN) 0.5 mg tablet TAKE 1 TABLET EVERY NIGHT. MAX DAILY DOSE OF 0.5MG.  potassium chloride SR (KLOR-CON 10) 10 mEq tablet Take 1 Tab by mouth daily.  venlafaxine-SR (EFFEXOR XR) 150 mg capsule Take 150 mg by mouth two (2) times daily (with meals).  dabigatran etexilate (PRADAXA) 150 mg capsule Take 150 mg by mouth two (2) times a day.  pregabalin (LYRICA) 200 mg capsule Take 200 mg by mouth two (2) times a day.  cholecalciferol, vitamin d3, (VITAMIN D3) 400 unit cap Take 400 Units by mouth daily.  aspirin 81 mg chewable tablet Take 81 mg by mouth daily.  atorvastatin (LIPITOR) 40 mg tablet Take 1 Tab by mouth nightly. No current facility-administered medications for this visit. There are no discontinued medications. [facility] follow up appointment(s):  
Future Appointments Date Time Provider Tammy Perales 11/20/2018 10:30 AM Kenn Walls MD [facility] SCHED  
2/6/2019  8:00 AM Jones Bustillos MD [address] [place] [address],[facility] [id number] [facility] follow up appointment(s): n/a [facility]:  n/a  
 
 
Goals  monitor and follow treatment plan to help prevent exacerbations of chf   
  11/19/18-NN spoke to patient who was re-admitted on 11/12/18 with acute on chronic systolic CHF and abdominal pain. Her heart failure was controlled, and Dr. Marty Sethi performed a lap cholecystectomy for cholecystitis. Patient continues to do daily weights to check for fluid retention, and is limiting her salt intake as well. In addition she will report any increase in shortness of breath and swelling in extremities should that occur. NN will check back in a week with patient to see how she is progressing. / vs 
 
11/9/18-NN spoke with patient and her  today. She was recently discharged from [address] [place] [address] for acute respiratory failure with hypoxemia due to systolic heart failure. Patient was then transferred to [facility] and discharged from that facility on 11/8/18. Reviewed with patient and her  importance of weighing and monitoring for swelling and increased shortness of breath. In addition discussed importance of low sodium diet. She is following medication regimen per cardiologist.  NN will check with her next week to see how she is doing.  / vs 
  
  Understands red flags post discharge. 11/19/18-NN spoke to patient re: recent hospital stay. She had a laparoscopic cholecystectomy during this hospital stay. Reviewed signs for patient to be alert to for post-operative infection, such as fever or abnormal drainage from site of surgery.   Patient voiced understanding and will report any problems, should they occur, to MD immediately. / vs

## 2018-11-19 NOTE — HOME CARE
Spoke to patient regarding the orders [facility] CHF visit. She politely declined the [facility] at this time. She stated that it is not currently necessary.  Daniel Haji made aware of the patients decision. Fredrick Mccracken RN [facility] Nurse Liaison [phone] or [phone]

## 2018-11-20 ENCOUNTER — OFFICE VISIT (OUTPATIENT)
Dept: INTERNAL MEDICINE CLINIC | Age: 68
End: 2018-11-20

## 2018-11-20 VITALS
BODY MASS INDEX: 34.36 KG/M2 | OXYGEN SATURATION: 90 % | HEART RATE: 79 BPM | HEIGHT: 69 IN | DIASTOLIC BLOOD PRESSURE: 72 MMHG | WEIGHT: 232 LBS | SYSTOLIC BLOOD PRESSURE: 108 MMHG

## 2018-11-20 DIAGNOSIS — I10 ESSENTIAL HYPERTENSION: Chronic | ICD-10-CM

## 2018-11-20 DIAGNOSIS — F41.9 ANXIETY: ICD-10-CM

## 2018-11-20 DIAGNOSIS — I50.23 ACUTE ON CHRONIC SYSTOLIC CHF (CONGESTIVE HEART FAILURE) (HCC): ICD-10-CM

## 2018-11-20 DIAGNOSIS — I48.91 ATRIAL FIBRILLATION WITH RAPID VENTRICULAR RESPONSE (HCC): ICD-10-CM

## 2018-11-20 DIAGNOSIS — K81.1 CHRONIC CHOLECYSTITIS: Primary | ICD-10-CM

## 2018-11-20 DIAGNOSIS — N18.30 STAGE 3 CHRONIC KIDNEY DISEASE (HCC): ICD-10-CM

## 2018-11-20 LAB
BACTERIA UA POCT, BACTPOCT: NORMAL
BILIRUB UR QL STRIP: NEGATIVE
CASTS UA POCT: NEGATIVE
CLUE CELLS, CLUEPOCT: NEGATIVE
CRYSTALS UA POCT, CRYSPOCT: NEGATIVE
EPITHELIAL CELLS POCT: NORMAL
GLUCOSE UR-MCNC: NEGATIVE MG/DL
GRAN# POC: 4.7 K/UL (ref 2–7.8)
GRAN% POC: 70 % (ref 37–92)
HCT VFR BLD CALC: 37.2 % (ref 37–51)
HGB BLD-MCNC: 12.4 G/DL (ref 12–18)
KETONES P FAST UR STRIP-MCNC: NEGATIVE MG/DL
LY# POC: 1.7 K/UL (ref 0.6–4.1)
LY% POC: 26.2 % (ref 10–58.5)
MCH RBC QN: 29.9 PG (ref 26–32)
MCHC RBC-ENTMCNC: 33.4 G/DL (ref 30–36)
MCV RBC: 89 FL (ref 80–97)
MID #, POC: 0.2 K/UL (ref 0–1.8)
MID% POC: 3.8 % (ref 0.1–24)
MUCUS UA POCT, MUCPOCT: NORMAL
PH UR STRIP: 6 [PH] (ref 5–7)
PLATELET # BLD: 241 K/UL (ref 140–440)
PROT UR QL STRIP: NEGATIVE
RBC # BLD: 4.16 M/UL (ref 4.2–6.3)
RBC UA POCT, RBCPOCT: NORMAL
SP GR UR STRIP: 1.02 (ref 1.01–1.02)
TRICH UA POCT, TRICHPOC: NEGATIVE
UA UROBILINOGEN AMB POC: NORMAL (ref 0.2–1)
URINALYSIS CLARITY POC: CLEAR
URINALYSIS COLOR POC: NORMAL
URINE BLOOD POC: NEGATIVE
URINE CULT COMMENT, POCT: NORMAL
URINE LEUKOCYTES POC: NORMAL
URINE NITRITES POC: NEGATIVE
WBC # BLD: 6.6 K/UL (ref 4.1–10.9)
WBC UA POCT, WBCPOCT: NORMAL
YEAST UA POCT, YEASTPOC: NEGATIVE

## 2018-11-20 RX ORDER — CLONAZEPAM 0.5 MG/1
TABLET ORAL
Qty: 90 TAB | Refills: 1 | Status: SHIPPED | OUTPATIENT
Start: 2018-11-20 | End: 2019-05-28 | Stop reason: SDUPTHER

## 2018-11-20 RX ORDER — OMEPRAZOLE 40 MG/1
40 CAPSULE, DELAYED RELEASE ORAL DAILY
Qty: 90 CAP | Refills: 3 | Status: SHIPPED | OUTPATIENT
Start: 2018-11-20 | End: 2019-09-11 | Stop reason: SDUPTHER

## 2018-11-20 RX ORDER — OMEPRAZOLE 40 MG/1
40 CAPSULE, DELAYED RELEASE ORAL DAILY
COMMUNITY
End: 2018-11-20 | Stop reason: SDUPTHER

## 2018-11-20 RX ORDER — BUMETANIDE 2 MG/1
2 TABLET ORAL DAILY
Qty: 90 TAB | Refills: 3 | Status: SHIPPED | OUTPATIENT
Start: 2018-11-20 | End: 2019-09-11 | Stop reason: SDUPTHER

## 2018-11-20 RX ORDER — AMLODIPINE BESYLATE 2.5 MG/1
2.5 TABLET ORAL DAILY
Qty: 90 TAB | Refills: 3 | Status: SHIPPED | OUTPATIENT
Start: 2018-11-20 | End: 2020-12-24

## 2018-11-20 NOTE — PROGRESS NOTES
This note will not be viewable in [address] Tabitha Kebede is a 79 y.o. female and presents with Transitions Of Care (gallbladder) Cynthia Alvares Subjective: 
Mrs. Tani Damon presents to the office today and transition of care subsequent to a hospitalization at [facility] from 11/12 until 11/18 when she was admitted with acute on chronic congestive heart failure, chronic atrial fibrillation and epigastric abdominal pain. She responded quickly to IV Bumex and workup of her abdominal pain was found to show chronic cholecystitis. The patient was seen by surgery and underwent a laparoscopic cholecystectomy which was uncomplicated. They had problems with blood pressure control postoperatively and medication was adjusted. The patient's diet was advanced and she was in stable medical condition and was discharged home for follow-up. The patient has been doing well since that time with a reduction in her abdominal discomfort although she continues to have discomfort from the surgery. She has had no diarrhea. She denies any cough, wheezing, sputum production, shortness of breath, PND or orthopnea. She denies chest pain or palpitations. Past Medical History:  
Diagnosis Date  Anxiety 1/22/2018  Arthritis OA  Asthma  Diabetes ([address]  Essential hypertension  GERD (gastroesophageal reflux disease)  Hypercholesterolemia 1/22/2018  Hypertension  Ill-defined condition   
 diverticulitis  Long-term use of high-risk medication 1/22/2018  Neuropathy  Other ill-defined conditions(799.89) IBS, spinal stenosis  Plantar fasciitis  Psychiatric disorder   
 depression, anxiety  Sleep apnea   
 uses CPAP  Type 2 diabetes mellitus with diabetic neuropathy, without long-term current use of insulin ([address] 6/5/2016 Past Surgical History:  
Procedure Laterality Date  CARDIAC SURG PROCEDURE UNLIST    
 stent  HX APPENDECTOMY  HX HYSTERECTOMY  HX PACEMAKER Allergies Allergen Reactions  Sulfa (Sulfonamide Antibiotics) Swelling  Amoxicillin Swelling Current Outpatient Medications Medication Sig Dispense Refill  bumetanide (BUMEX) 2 mg tablet Take 1 Tab by mouth daily. 90 Tab 3  clonazePAM (KLONOPIN) 0.5 mg tablet TAKE 1 TABLET EVERY NIGHT. MAX DAILY DOSE OF 0.5MG. 90 Tab 1  
 amLODIPine (NORVASC) 2.5 mg tablet Take 1 Tab by mouth daily. 90 Tab 3  
 omeprazole (PRILOSEC) 40 mg capsule Take 1 Cap by mouth daily. 90 Cap 3  
 lisinopril (PRINIVIL, ZESTRIL) 10 mg tablet Take 1 Tab by mouth daily. 30 Tab prn  metoprolol succinate (TOPROL-XL) 100 mg tablet Take 1 Tab by mouth daily. 30 Tab prn  acetaminophen (TYLENOL EXTRA STRENGTH) 500 mg tablet Take 1,000 mg by mouth every eight (8) hours as needed for Pain (Headache).  lidocaine (ASPERCREME, LIDOCAINE,) 4 % topical cream Apply  to affected area two (2) times daily as needed for Pain (Knee pain).  sodium chloride (AYR SALINE) 0.65 % nasal squeeze bottle 1 [place] by Both Nostrils route two (2) times a day.  conjugated estrogens (PREMARIN) 0.625 mg/gram vaginal cream Insert 0.5 g into vagina two (2) days a week. Tuesdays and Thursdays  potassium chloride SR (KLOR-CON 10) 10 mEq tablet Take 1 Tab by mouth daily. 30 Tab 0  
 venlafaxine-SR (EFFEXOR XR) 150 mg capsule Take 150 mg by mouth two (2) times daily (with meals).  dabigatran etexilate (PRADAXA) 150 mg capsule Take 150 mg by mouth two (2) times a day.  pregabalin (LYRICA) 200 mg capsule Take 200 mg by mouth two (2) times a day.  cholecalciferol, vitamin d3, (VITAMIN D3) 400 unit cap Take 400 Units by mouth daily.  aspirin 81 mg chewable tablet Take 81 mg by mouth daily.  atorvastatin (LIPITOR) 40 mg tablet Take 1 Tab by mouth nightly. 30 Tab 12 Social History Socioeconomic History  Marital status:  Spouse name: Not on file  Number of children: Not on file  Years of education: Not on file  Highest education level: Not on file Social Needs  Financial resource strain: Not on file  Food insecurity - worry: Not on file  Food insecurity - inability: Not on file  Transportation needs - medical: Not on file  Transportation needs - non-medical: Not on file Occupational History  Not on file Tobacco Use  Smoking status: Never Smoker  Smokeless tobacco: Never Used Substance and Sexual Activity  Alcohol use: No  
 Drug use: No  
 Sexual activity: Not on file Other Topics Concern  Not on file Social History Narrative  Not on file Family History Problem Relation Age of Onset 26 [facility] Arthritis-osteo Mother  Hypertension Mother  High Cholesterol Mother  Crohn's Disease Mother  Heart Disease Mother  Alcohol abuse Father  High Cholesterol Sister  Hypertension Sister  Thyroid Disease Sister  COPD Sister  High Cholesterol Brother  Hypertension Brother  COPD Brother  COPD Child  Inflammatory Bowel Dz Child Health Maintenance Topic Date Due  
 Hepatitis C Screening  1950  DTaP/Tdap/Td series (1 - Tdap) 09/13/1973  Shingrix Vaccine Age 50> (1 of 2) 09/13/2002  BREAST CANCER SCRN MAMMOGRAM  09/13/2002  FOBT Q 1 YEAR AGE 50-75  09/13/2002  Bone Densitometry (Dexa) Screening  09/13/2017  Pneumococcal 65+ Low/Medium Risk (1 of 2 - PCV13) 09/13/2017  MEDICARE YEARLY EXAM  03/14/2018  HEMOGLOBIN A1C Q6M  02/01/2019  
 EYE EXAM RETINAL OR DILATED Q1  04/18/2019  
 FOOT EXAM Q1  08/01/2019  MICROALBUMIN Q1  08/01/2019  LIPID PANEL Q1  08/01/2019  GLAUCOMA SCREENING Q2Y  04/18/2020  Influenza Age 5 to Adult  Completed Review of Systems Constitutional: negative for fevers, chills, anorexia and weight loss Eyes:   negative for visual disturbance and irritation ENT:   negative for tinnitus,sore throat,nasal congestion,ear pain,hoarseness Respiratory:  negative for cough, hemoptysis, dyspnea,wheezing CV:   negative for chest pain, palpitations, lower extremity edema GI:   Positive for mild postoperative abdominal discomfort without diarrhea Endo:               negative for polyuria,polydipsia,polyphagia,heat intolerance Genitourinary: negative for frequency, dysuria and hematuria Integumentary: negative for rash and pruritus Hematologic:  negative for easy bruising and gum/nose bleeding Musculoskel: negative for myalgias, arthralgias, back pain, muscle weakness, joint pain Neurological:  negative for headaches, dizziness, vertigo, memory problems and gait Behavl/Psych: negative for feelings of anxiety, depression, mood changes ROS otherwise negative Objective: 
Visit Vitals /72 (BP 1 Location: Left arm, BP Patient Position: Sitting) Pulse 79 Ht 5' 9\" (1.753 m) Wt 232 lb (105.2 kg) SpO2 90% BMI 34.26 kg/m² Body mass index is 34.26 kg/m². Physical Exam:  
General appearance - alert, well appearing, and in no distress Mental status - alert, oriented to person, place, and time EYE-VIVEK, EOMI,conjunctiva normal bilaterally, lids normal 
ENT-ENT exam normal, no neck nodes or sinus tenderness Nose - normal and patent, no erythema,  Or discharge Mouth - mucous membranes moist, pharynx normal without lesions Neck - supple, no significant adenopathy or bruit Chest - clear to auscultation, no wheezes, rales or rhonchi. Heart - normal rate, regular rhythm, normal S1, S2, no murmurs, rubs, clicks or gallops Abdomen - soft, nontender, nondistended. Surgical wounds are healing well without secondary cellulitis Lymph- no adenopathy palpable Ext-peripheral pulses normal, no pedal edema, no clubbing or cyanosis Skin-Warm and dry. no hyperpigmentation, vitiligo, or suspicious lesions Neuro -alert, oriented, normal speech, no focal findings or movement disorder noted Assessment/Plan: 
Diagnoses and all orders for this visit: 
 
Chronic cholecystitis -     AMB POC COMPLETE CBC,AUTOMATED ENTER 
-     COLLECTION VENOUS BLOOD,VENIPUNCTURE 
-     AMB POC URINALYSIS DIP STICK AUTO W/ MICRO  
-     METABOLIC PANEL, COMPREHENSIVE Acute on chronic systolic CHF (congestive heart failure) ([facility] Essential hypertension Atrial fibrillation with rapid ventricular response ([facility] Stage 3 chronic kidney disease ([facility] Anxiety 
-     clonazePAM (KLONOPIN) 0.5 mg tablet; TAKE 1 TABLET EVERY NIGHT. MAX DAILY DOSE OF 0.5MG. , Print, Disp-90 Tab, R-1 Other orders -     bumetanide (BUMEX) 2 mg tablet; Take 1 Tab by mouth daily. , Normal, Disp-90 Tab, R-3 
-     amLODIPine (NORVASC) 2.5 mg tablet; Take 1 Tab by mouth daily. , Normal, Disp-90 Tab, R-3 
-     omeprazole (PRILOSEC) 40 mg capsule; Take 1 Cap by mouth daily. , Normal, Disp-90 Cap, R-3 Other instructions: The patient's medications are reviewed and reconciled. No change in her current medical regimen is made. Body mass index is 34.26 and dietary counseling along with printed patient education is given Medication refills were given today Await results of multiple labs Progressive physical activity as tolerated Follow-up of all medical problems in 3 months time Follow-up Disposition: 
Return in about 3 months (around 2/20/2019). I have reviewed with the patient details of the assessment and plan and all questions were answered. Relevent patient education was performed. The most recent lab findings were reviewed with the patient. An After Visit Summary was printed and given to the patient.  
 
Narayan Hall MD

## 2018-11-20 NOTE — PROGRESS NOTES
Cinthya Hernandez is a 79 y.o. female presenting for Transitions Of Care (gallbladder) [place] 1. Have you been to the ER, urgent care clinic since your last visit? Hospitalized since your last visit? Yes When: 11/12/2018 [facility] Reason for visit: gallbladder 2. Have you seen or consulted any other health care providers outside of the [address] [place] [address] [place] since your last visit? Include any pap smears or colon screening. No 
 
Fall Risk Assessment, last 12 mths 8/1/2018 Able to walk? Yes Fall in past 12 months? No  
 
 
 
No flowsheet data found. No flowsheet data found. There are no discontinued medications.

## 2018-11-20 NOTE — PATIENT INSTRUCTIONS

## 2018-11-21 LAB
ALBUMIN SERPL-MCNC: 3.8 G/DL (ref 3.6–4.8)
ALBUMIN/GLOB SERPL: 1.7 {RATIO} (ref 1.2–2.2)
ALP SERPL-CCNC: 118 IU/L (ref 39–117)
ALT SERPL-CCNC: 10 IU/L (ref 0–32)
AST SERPL-CCNC: 19 IU/L (ref 0–40)
BILIRUB SERPL-MCNC: 0.8 MG/DL (ref 0–1.2)
BUN SERPL-MCNC: 22 MG/DL (ref 8–27)
BUN/CREAT SERPL: 15 (ref 12–28)
CALCIUM SERPL-MCNC: 8.7 MG/DL (ref 8.7–10.3)
CHLORIDE SERPL-SCNC: 103 MMOL/L (ref 96–106)
CO2 SERPL-SCNC: 26 MMOL/L (ref 20–29)
CREAT SERPL-MCNC: 1.51 MG/DL (ref 0.57–1)
GLOBULIN SER CALC-MCNC: 2.3 G/DL (ref 1.5–4.5)
GLUCOSE SERPL-MCNC: 86 MG/DL (ref 65–99)
POTASSIUM SERPL-SCNC: 3.5 MMOL/L (ref 3.5–5.2)
PROT SERPL-MCNC: 6.1 G/DL (ref 6–8.5)
SODIUM SERPL-SCNC: 147 MMOL/L (ref 134–144)

## 2018-11-27 ENCOUNTER — PATIENT OUTREACH (OUTPATIENT)
Dept: INTERNAL MEDICINE CLINIC | Age: 68
End: 2018-11-27

## 2018-11-27 ENCOUNTER — TELEPHONE (OUTPATIENT)
Dept: INTERNAL MEDICINE CLINIC | Age: 68
End: 2018-11-27

## 2018-11-27 NOTE — TELEPHONE ENCOUNTER
Needs letter stating she was in the hospital on 10-20-18 being overseen by you. Her daughters work is requesting this so they will pay her for visiting her that day.

## 2018-11-27 NOTE — PROGRESS NOTES
Goals  monitor and follow treatment plan to help prevent exacerbations of chf   
  11/27/18-NN spoke with patient to get an update. She continues to do weights daily, and those have remained stable. She states she can tell when she has eaten something that is too heavy for her stomach due to recent surgery. She continues to watch her salt intake and has noticed no swelling in her extremities or increased shortness of breath. NN will re-check with patient in 1 week. / vs 
 
11/19/18-NN spoke to patient who was re-admitted on 11/12/18 with acute on chronic systolic CHF and abdominal pain. Her heart failure was controlled, and Dr. Hai Mejia performed a lap cholecystectomy for cholecystitis. Patient continues to do daily weights to check for fluid retention, and is limiting her salt intake as well. In addition she will report any increase in shortness of breath and swelling in extremities should that occur. NN will check back in a week with patient to see how she is progressing. / vs 
 
11/9/18-NN spoke with patient and her  today. She was recently discharged from [facility] for acute respiratory failure with hypoxemia due to systolic heart failure. Patient was then transferred to [facility] and discharged from that facility on 11/8/18. Reviewed with patient and her  importance of weighing and monitoring for swelling and increased shortness of breath. In addition discussed importance of low sodium diet. She is following medication regimen per cardiologist.  NN will check with her next week to see how she is doing.  / vs 
  
  Understands red flags post discharge. 11/19/18-NN spoke to patient re: recent hospital stay. She had a laparoscopic cholecystectomy during this hospital stay. Reviewed signs for patient to be alert to for post-operative infection, such as fever or abnormal drainage from site of surgery.   Patient voiced understanding and will report any problems, should they occur, to MD immediately. / vs

## 2018-11-27 NOTE — LETTER
11/28/2018 6:31 AM 
 
    RE: Ms. Mendes Carry [address] [address] To Whom It May Concern: This letter is to confirm that the above named patient was under my care as a patient at [facility] on November 20th, 2018. Please contact me if you should have any further questions. Respectfully, Chidi Velasquez M.D. Sincerely, Ashly Slater MD

## 2018-11-28 RX ORDER — POTASSIUM CHLORIDE 750 MG/1
10 TABLET, FILM COATED, EXTENDED RELEASE ORAL DAILY
Qty: 30 TAB | Refills: 0 | Status: SHIPPED | OUTPATIENT
Start: 2018-11-28 | End: 2019-01-02 | Stop reason: SDUPTHER

## 2018-11-28 NOTE — TELEPHONE ENCOUNTER
Pharmacy on file verified  Requested Prescriptions     Pending Prescriptions Disp Refills    potassium chloride SR (KLOR-CON 10) 10 mEq tablet 30 Tab 0     Sig: Take 1 Tab by mouth daily.      LOV 11/20/2018  NOV 2/6/19

## 2018-11-30 ENCOUNTER — OFFICE VISIT (OUTPATIENT)
Dept: SURGERY | Age: 68
End: 2018-11-30

## 2018-11-30 VITALS
RESPIRATION RATE: 15 BRPM | WEIGHT: 230.6 LBS | HEIGHT: 69 IN | HEART RATE: 80 BPM | OXYGEN SATURATION: 94 % | DIASTOLIC BLOOD PRESSURE: 90 MMHG | TEMPERATURE: 97.9 F | BODY MASS INDEX: 34.16 KG/M2 | SYSTOLIC BLOOD PRESSURE: 143 MMHG

## 2018-11-30 DIAGNOSIS — K81.1 CHRONIC CHOLECYSTITIS: Primary | ICD-10-CM

## 2018-11-30 NOTE — PROGRESS NOTES
SUBJECTIVE: Johnnie Starks is a 79 y.o. female is seen for a routine postop check s/p lap cholecystectomy. Reports no problems with the wound or other issues. Activity, diet and bowels are normal. No pain. OBJECTIVE: Appears well. Wounds are well healed without complications or infection. ASSESSMENT: normal postoperative course, doing well. PLAN: Patient is instructed to avoid heavy lifting for 2 more weeks. Return PRN for any problems or concerns.

## 2018-11-30 NOTE — PROGRESS NOTES
Chief Complaint Patient presents with  Surgical Follow-up Gallbladder 11/15/18 1. Have you been to the ER, urgent care clinic since your last visit? Hospitalized since your last visit? No 
 
2. Have you seen or consulted any other health care providers outside of the [address] [place] [address] since your last visit? Include any pap smears or colon screening.  No

## 2018-12-04 ENCOUNTER — PATIENT OUTREACH (OUTPATIENT)
Dept: INTERNAL MEDICINE CLINIC | Age: 68
End: 2018-12-04

## 2018-12-04 NOTE — PROGRESS NOTES
Goals  monitor and follow treatment plan to help prevent exacerbations of chf   
  12/4/18-NN spoke with patient. Her weight is staying stable and no swelling in extremities noted or increase in shortness of breath. NN will check in with patient in 2 weeks to monitor progress. / vs 
 
11/27/18-NN spoke with patient to get an update. She continues to do weights daily, and those have remained stable. She states she can tell when she has eaten something that is too heavy for her stomach due to recent surgery. She continues to watch her salt intake and has noticed no swelling in her extremities or increased shortness of breath. NN will re-check with patient in 1 week. / vs 
 
11/19/18-NN spoke to patient who was re-admitted on 11/12/18 with acute on chronic systolic CHF and abdominal pain. Her heart failure was controlled, and Dr. Qamar Garcia performed a lap cholecystectomy for cholecystitis. Patient continues to do daily weights to check for fluid retention, and is limiting her salt intake as well. In addition she will report any increase in shortness of breath and swelling in extremities should that occur. NN will check back in a week with patient to see how she is progressing. / vs 
 
11/9/18-NN spoke with patient and her  today. She was recently discharged from [facility] for acute respiratory failure with hypoxemia due to systolic heart failure. Patient was then transferred to [facility] and discharged from that facility on 11/8/18. Reviewed with patient and her  importance of weighing and monitoring for swelling and increased shortness of breath. In addition discussed importance of low sodium diet. She is following medication regimen per cardiologist.  NN will check with her next week to see how she is doing.  / vs 
  
  Understands red flags post discharge. 11/19/18-NN spoke to patient re: recent hospital stay.   She had a laparoscopic cholecystectomy during this hospital stay. Reviewed signs for patient to be alert to for post-operative infection, such as fever or abnormal drainage from site of surgery.   Patient voiced understanding and will report any problems, should they occur, to MD immediately. / vs

## 2018-12-26 ENCOUNTER — PATIENT OUTREACH (OUTPATIENT)
Dept: INTERNAL MEDICINE CLINIC | Age: 68
End: 2018-12-26

## 2018-12-26 NOTE — PROGRESS NOTES
Goals      monitor and follow treatment plan to help prevent exacerbations of chf      12/26/18-NN spoke with patient. She is weighing herself daily, and has had no significant increase in her weight nor swelling in her extremities. She states she did have some chest discomfort on Andres day, however it subsided. NN advised patient to call PCP or cardiologist if she does have any chest pain, shortness of breath that persists. NN will be available to patient as needed. / vs    12/4/18-NN spoke with patient. Her weight is staying stable and no swelling in extremities noted or increase in shortness of breath. NN will check in with patient in 2 weeks to monitor progress. / vs    11/27/18-NN spoke with patient to get an update. She continues to do weights daily, and those have remained stable. She states she can tell when she has eaten something that is too heavy for her stomach due to recent surgery. She continues to watch her salt intake and has noticed no swelling in her extremities or increased shortness of breath. NN will re-check with patient in 1 week. / vs    11/19/18-NN spoke to patient who was re-admitted on 11/12/18 with acute on chronic systolic CHF and abdominal pain. Her heart failure was controlled, and Dr. Brock Black performed a lap cholecystectomy for cholecystitis. Patient continues to do daily weights to check for fluid retention, and is limiting her salt intake as well. In addition she will report any increase in shortness of breath and swelling in extremities should that occur. NN will check back in a week with patient to see how she is progressing. / vs    11/9/18-NN spoke with patient and her  today. She was recently discharged from [facility] for acute respiratory failure with hypoxemia due to systolic heart failure. Patient was then transferred to [facility] and discharged from that facility on 11/8/18.   Reviewed with patient and her  importance of weighing and monitoring for swelling and increased shortness of breath. In addition discussed importance of low sodium diet. She is following medication regimen per cardiologist.  NN will check with her next week to see how she is doing.  / vs       Understands red flags post discharge. 11/19/18-NN spoke to patient re: recent hospital stay. She had a laparoscopic cholecystectomy during this hospital stay. Reviewed signs for patient to be alert to for post-operative infection, such as fever or abnormal drainage from site of surgery.   Patient voiced understanding and will report any problems, should they occur, to MD immediately. / vs

## 2018-12-26 NOTE — PROGRESS NOTES
Patient has graduated from the Transitions of Care Coordination  program on 12/26/18. Patient's symptoms are stable at this time. Patient/family has the ability to self-manage. Care management goals have been completed at this time. No further nurse navigator follow up scheduled. Goals Addressed                 This Visit's Progress     COMPLETED: monitor and follow treatment plan to help prevent exacerbations of chf        12/26/18-NN spoke with patient. She is weighing herself daily, and has had no significant increase in her weight nor swelling in her extremities. She states she did have some chest discomfort on Andres day, however it subsided. NN advised patient to call PCP or cardiologist if she does have any chest pain, shortness of breath that persists. NN will be available to patient as needed. / vs    12/4/18-NN spoke with patient. Her weight is staying stable and no swelling in extremities noted or increase in shortness of breath. NN will check in with patient in 2 weeks to monitor progress. / vs    11/27/18-NN spoke with patient to get an update. She continues to do weights daily, and those have remained stable. She states she can tell when she has eaten something that is too heavy for her stomach due to recent surgery. She continues to watch her salt intake and has noticed no swelling in her extremities or increased shortness of breath. NN will re-check with patient in 1 week. / vs    11/19/18-NN spoke to patient who was re-admitted on 11/12/18 with acute on chronic systolic CHF and abdominal pain. Her heart failure was controlled, and Dr. Ger Cleary performed a lap cholecystectomy for cholecystitis. Patient continues to do daily weights to check for fluid retention, and is limiting her salt intake as well. In addition she will report any increase in shortness of breath and swelling in extremities should that occur.   NN will check back in a week with patient to see how she is progressing. / vs    11/9/18-NN spoke with patient and her  today. She was recently discharged from [address] [place] [address] for acute respiratory failure with hypoxemia due to systolic heart failure. Patient was then transferred to [facility] and discharged from that facility on 11/8/18. Reviewed with patient and her  importance of weighing and monitoring for swelling and increased shortness of breath. In addition discussed importance of low sodium diet. She is following medication regimen per cardiologist.  NN will check with her next week to see how she is doing.  / vs       COMPLETED: Understands red flags post discharge. 11/19/18-NN spoke to patient re: recent hospital stay. She had a laparoscopic cholecystectomy during this hospital stay. Reviewed signs for patient to be alert to for post-operative infection, such as fever or abnormal drainage from site of surgery. Patient voiced understanding and will report any problems, should they occur, to MD immediately. / vs            Pt has nurse navigator's contact information for any further questions, concerns, or needs.   Patients upcoming visits:    Future Appointments   Date Time Provider Tammy Perales   2/6/2019  8:00 AM Tc Chery MD [address] [place] [address],[facility], [id number]

## 2019-01-02 RX ORDER — POTASSIUM CHLORIDE 750 MG/1
10 TABLET, FILM COATED, EXTENDED RELEASE ORAL DAILY
Qty: 90 TAB | Refills: 0 | Status: SHIPPED | OUTPATIENT
Start: 2019-01-02 | End: 2019-03-04 | Stop reason: SDUPTHER

## 2019-01-02 NOTE — TELEPHONE ENCOUNTER
Pharmacy on file verified  Requested Prescriptions     Pending Prescriptions Disp Refills    potassium chloride SR (KLOR-CON 10) 10 mEq tablet 90 Tab 0     Sig: Take 1 Tab by mouth daily.      LOV 11/20/18  NOV 2/6/19

## 2019-01-27 ENCOUNTER — APPOINTMENT (OUTPATIENT)
Dept: GENERAL RADIOLOGY | Age: 69
End: 2019-01-27
Attending: EMERGENCY MEDICINE
Payer: MEDICARE

## 2019-01-27 ENCOUNTER — HOSPITAL ENCOUNTER (EMERGENCY)
Age: 69
Discharge: HOME | End: 2019-01-27
Attending: EMERGENCY MEDICINE
Payer: MEDICARE

## 2019-01-27 VITALS
RESPIRATION RATE: 17 BRPM | DIASTOLIC BLOOD PRESSURE: 98 MMHG | HEART RATE: 80 BPM | SYSTOLIC BLOOD PRESSURE: 165 MMHG | OXYGEN SATURATION: 94 % | TEMPERATURE: 98.1 F

## 2019-01-27 DIAGNOSIS — I50.9 ACUTE CONGESTIVE HEART FAILURE, UNSPECIFIED HEART FAILURE TYPE (HCC): Primary | ICD-10-CM

## 2019-01-27 DIAGNOSIS — J45.21 MILD INTERMITTENT ASTHMA WITH ACUTE EXACERBATION: ICD-10-CM

## 2019-01-27 LAB
ALBUMIN SERPL-MCNC: 3.5 G/DL (ref 3.5–5)
ALBUMIN/GLOB SERPL: 0.9 {RATIO} (ref 1.1–2.2)
ALP SERPL-CCNC: 191 U/L (ref 45–117)
ALT SERPL-CCNC: 24 U/L (ref 12–78)
ANION GAP SERPL CALC-SCNC: 9 MMOL/L (ref 5–15)
AST SERPL-CCNC: 22 U/L (ref 15–37)
BASOPHILS # BLD: 0 K/UL (ref 0–0.1)
BASOPHILS # BLD: 0 K/UL (ref 0–0.1)
BASOPHILS NFR BLD: 0 % (ref 0–1)
BASOPHILS NFR BLD: 0 % (ref 0–1)
BILIRUB SERPL-MCNC: 0.9 MG/DL (ref 0.2–1)
BNP SERPL-MCNC: 7047 PG/ML (ref 0–125)
BUN SERPL-MCNC: 22 MG/DL (ref 6–20)
BUN/CREAT SERPL: 16 (ref 12–20)
CALCIUM SERPL-MCNC: 8.9 MG/DL (ref 8.5–10.1)
CHLORIDE SERPL-SCNC: 109 MMOL/L (ref 97–108)
CO2 SERPL-SCNC: 26 MMOL/L (ref 21–32)
CREAT SERPL-MCNC: 1.35 MG/DL (ref 0.55–1.02)
DIFFERENTIAL METHOD BLD: ABNORMAL
DIFFERENTIAL METHOD BLD: ABNORMAL
EOSINOPHIL # BLD: 0.1 K/UL (ref 0–0.4)
EOSINOPHIL # BLD: 0.2 K/UL (ref 0–0.4)
EOSINOPHIL NFR BLD: 1 % (ref 0–7)
EOSINOPHIL NFR BLD: 1 % (ref 0–7)
ERYTHROCYTE [DISTWIDTH] IN BLOOD BY AUTOMATED COUNT: 17.6 % (ref 11.5–14.5)
ERYTHROCYTE [DISTWIDTH] IN BLOOD BY AUTOMATED COUNT: 17.9 % (ref 11.5–14.5)
GLOBULIN SER CALC-MCNC: 3.8 G/DL (ref 2–4)
GLUCOSE SERPL-MCNC: 164 MG/DL (ref 65–100)
HCT VFR BLD AUTO: 40.1 % (ref 35–47)
HCT VFR BLD AUTO: 44.1 % (ref 35–47)
HGB BLD-MCNC: 12.8 G/DL (ref 11.5–16)
HGB BLD-MCNC: 14 G/DL (ref 11.5–16)
IMM GRANULOCYTES # BLD AUTO: 0.1 K/UL (ref 0–0.04)
IMM GRANULOCYTES # BLD AUTO: 0.1 K/UL (ref 0–0.04)
IMM GRANULOCYTES NFR BLD AUTO: 1 % (ref 0–0.5)
IMM GRANULOCYTES NFR BLD AUTO: 1 % (ref 0–0.5)
LYMPHOCYTES # BLD: 0.7 K/UL (ref 0.8–3.5)
LYMPHOCYTES # BLD: 1.4 K/UL (ref 0.8–3.5)
LYMPHOCYTES NFR BLD: 11 % (ref 12–49)
LYMPHOCYTES NFR BLD: 12 % (ref 12–49)
MCH RBC QN AUTO: 28.8 PG (ref 26–34)
MCH RBC QN AUTO: 29 PG (ref 26–34)
MCHC RBC AUTO-ENTMCNC: 31.7 G/DL (ref 30–36.5)
MCHC RBC AUTO-ENTMCNC: 31.9 G/DL (ref 30–36.5)
MCV RBC AUTO: 90.7 FL (ref 80–99)
MCV RBC AUTO: 90.9 FL (ref 80–99)
MONOCYTES # BLD: 0.1 K/UL (ref 0–1)
MONOCYTES # BLD: 0.2 K/UL (ref 0–1)
MONOCYTES NFR BLD: 2 % (ref 5–13)
MONOCYTES NFR BLD: 2 % (ref 5–13)
NEUTS SEG # BLD: 5.6 K/UL (ref 1.8–8)
NEUTS SEG # BLD: 9.2 K/UL (ref 1.8–8)
NEUTS SEG NFR BLD: 83 % (ref 32–75)
NEUTS SEG NFR BLD: 85 % (ref 32–75)
NRBC # BLD: 0 K/UL (ref 0–0.01)
NRBC # BLD: 0 K/UL (ref 0–0.01)
NRBC BLD-RTO: 0 PER 100 WBC
NRBC BLD-RTO: 0 PER 100 WBC
PLATELET # BLD AUTO: 177 K/UL (ref 150–400)
PLATELET # BLD AUTO: 215 K/UL (ref 150–400)
PMV BLD AUTO: 11.6 FL (ref 8.9–12.9)
PMV BLD AUTO: 12.3 FL (ref 8.9–12.9)
POTASSIUM SERPL-SCNC: 3.3 MMOL/L (ref 3.5–5.1)
PROT SERPL-MCNC: 7.3 G/DL (ref 6.4–8.2)
RBC # BLD AUTO: 4.41 M/UL (ref 3.8–5.2)
RBC # BLD AUTO: 4.86 M/UL (ref 3.8–5.2)
RBC MORPH BLD: ABNORMAL
SODIUM SERPL-SCNC: 144 MMOL/L (ref 136–145)
TROPONIN I SERPL-MCNC: <0.05 NG/ML
TROPONIN I SERPL-MCNC: <0.05 NG/ML
WBC # BLD AUTO: 11.1 K/UL (ref 3.6–11)
WBC # BLD AUTO: 6.6 K/UL (ref 3.6–11)

## 2019-01-27 PROCEDURE — 85025 COMPLETE CBC W/AUTO DIFF WBC: CPT

## 2019-01-27 PROCEDURE — 99284 EMERGENCY DEPT VISIT MOD MDM: CPT

## 2019-01-27 PROCEDURE — 94640 AIRWAY INHALATION TREATMENT: CPT

## 2019-01-27 PROCEDURE — 74011000250 HC RX REV CODE- 250: Performed by: EMERGENCY MEDICINE

## 2019-01-27 PROCEDURE — 74011250636 HC RX REV CODE- 250/636: Performed by: EMERGENCY MEDICINE

## 2019-01-27 PROCEDURE — 71045 X-RAY EXAM CHEST 1 VIEW: CPT

## 2019-01-27 PROCEDURE — 83880 ASSAY OF NATRIURETIC PEPTIDE: CPT

## 2019-01-27 PROCEDURE — 84484 ASSAY OF TROPONIN QUANT: CPT

## 2019-01-27 PROCEDURE — 96374 THER/PROPH/DIAG INJ IV PUSH: CPT

## 2019-01-27 PROCEDURE — 80053 COMPREHEN METABOLIC PANEL: CPT

## 2019-01-27 PROCEDURE — 77030029684 HC NEB SM VOL KT MONA -A

## 2019-01-27 PROCEDURE — 74011636637 HC RX REV CODE- 636/637: Performed by: EMERGENCY MEDICINE

## 2019-01-27 PROCEDURE — A9270 NON-COVERED ITEM OR SERVICE: HCPCS | Performed by: EMERGENCY MEDICINE

## 2019-01-27 PROCEDURE — 36415 COLL VENOUS BLD VENIPUNCTURE: CPT

## 2019-01-27 PROCEDURE — 93005 ELECTROCARDIOGRAM TRACING: CPT

## 2019-01-27 RX ORDER — FUROSEMIDE 10 MG/ML
40 INJECTION INTRAMUSCULAR; INTRAVENOUS
Status: COMPLETED | OUTPATIENT
Start: 2019-01-27 | End: 2019-01-27

## 2019-01-27 RX ORDER — ALBUTEROL SULFATE 90 UG/1
2 AEROSOL, METERED RESPIRATORY (INHALATION)
Status: DISCONTINUED | OUTPATIENT
Start: 2019-01-27 | End: 2019-01-27 | Stop reason: HOSPADM

## 2019-01-27 RX ORDER — PREDNISONE 20 MG/1
60 TABLET ORAL DAILY
Qty: 12 TAB | Refills: 0 | Status: SHIPPED | OUTPATIENT
Start: 2019-01-27 | End: 2019-01-31 | Stop reason: ALTCHOICE

## 2019-01-27 RX ORDER — PREDNISONE 20 MG/1
60 TABLET ORAL
Status: COMPLETED | OUTPATIENT
Start: 2019-01-27 | End: 2019-01-27

## 2019-01-27 RX ORDER — IPRATROPIUM BROMIDE AND ALBUTEROL SULFATE 2.5; .5 MG/3ML; MG/3ML
3 SOLUTION RESPIRATORY (INHALATION)
Status: COMPLETED | OUTPATIENT
Start: 2019-01-27 | End: 2019-01-27

## 2019-01-27 RX ADMIN — IPRATROPIUM BROMIDE AND ALBUTEROL SULFATE 3 ML: .5; 3 SOLUTION RESPIRATORY (INHALATION) at 10:32

## 2019-01-27 RX ADMIN — FUROSEMIDE 40 MG: 10 INJECTION, SOLUTION INTRAMUSCULAR; INTRAVENOUS at 10:36

## 2019-01-27 RX ADMIN — PREDNISONE 60 MG: 20 TABLET ORAL at 11:38

## 2019-01-27 NOTE — ED NOTES
Discharge paperwork given to pt by MD and Albuterol inhaler given to pt. Pt verbalized understanding. No acute changes noted to pt. Stable and ambulatory upon discharge.

## 2019-01-27 NOTE — ED TRIAGE NOTES
Pt c/o mid-sternal CP since 11pm last night. Pt was diaphoretic on EMS arrival. Was given aspirin and nitro en route, still c/o 8/10 pain. Per EMS, pt with increasing wheezes and SOB. Pt is A&O.

## 2019-01-27 NOTE — ED PROVIDER NOTES
EMERGENCY DEPARTMENT HISTORY AND PHYSICAL EXAM 
 
 
Date: 1/27/2019 Patient Name: Dileep Zhang History of Presenting Illness Chief Complaint Patient presents with  Chest Pain History Provided By: Patient HPI: Dileep Zhang, 79 y.o. female with PMHx significant for CHF, asthma, neuropathy, essential HTN, GERD, DM (type 2), HLD, sleep apnea, diverticulitis, plantar fasciitis, presents by EMS to the ED with cc of moderate, worsening SOB that started last night. She also reports orthopnea, wheezing and chest tightness. This patient states she follows cardiology for her CHF and takes fluid pills in the AM. She states last night she started feeling worse SOB than usual and was worried she was having an asthma attack. Pt reports her orthopnea started at this time. She states she went to bed with her CPAP which gave relief. Pt notes she awoke the next morning with worse SOB, wheezing, and chest tightness. She states she decided to come to the ED at this time. She denies any Hx of PE or DVT. She denies any other modifying factors to her Sx. Pt specifically denies nausea, vomiting, diaphoresis, numbness, weakness, tingling, calf pain, LE swelling, extremity pain, and lightheadedness. Did not take her diuretic today There are no other complaints, changes, or physical findings at this time. Social History: -tobacco, -EtOH, -Illicit Drugs PCP: Jordon Goldsmith MD 
Cardiology: Dr. Ginger Little Current Facility-Administered Medications Medication Dose Route Frequency Provider Last Rate Last Dose  albuterol (PROVENTIL HFA, VENTOLIN HFA, PROAIR HFA) inhaler 2 Puff  2 Puff Inhalation NOW Yanique Huber MD      
 
Current Outpatient Medications Medication Sig Dispense Refill  predniSONE (DELTASONE) 20 mg tablet Take 60 mg by mouth daily for 4 days. 12 Tab 0  
 potassium chloride SR (KLOR-CON 10) 10 mEq tablet Take 1 Tab by mouth daily.  90 Tab 0  
  bumetanide (BUMEX) 2 mg tablet Take 1 Tab by mouth daily. 90 Tab 3  clonazePAM (KLONOPIN) 0.5 mg tablet TAKE 1 TABLET EVERY NIGHT. MAX DAILY DOSE OF 0.5MG. 90 Tab 1  
 amLODIPine (NORVASC) 2.5 mg tablet Take 1 Tab by mouth daily. 90 Tab 3  
 omeprazole (PRILOSEC) 40 mg capsule Take 1 Cap by mouth daily. 90 Cap 3  
 lisinopril (PRINIVIL, ZESTRIL) 10 mg tablet Take 1 Tab by mouth daily. 30 Tab prn  metoprolol succinate (TOPROL-XL) 100 mg tablet Take 1 Tab by mouth daily. 30 Tab prn  acetaminophen (TYLENOL EXTRA STRENGTH) 500 mg tablet Take 1,000 mg by mouth every eight (8) hours as needed for Pain (Headache).  lidocaine (ASPERCREME, LIDOCAINE,) 4 % topical cream Apply  to affected area two (2) times daily as needed for Pain (Knee pain).  sodium chloride (AYR SALINE) 0.65 % nasal squeeze bottle 1 [place] by Both Nostrils route two (2) times a day.  conjugated estrogens (PREMARIN) 0.625 mg/gram vaginal cream Insert 0.5 g into vagina two (2) days a week. Tuesdays and Thursdays  venlafaxine-SR (EFFEXOR XR) 150 mg capsule Take 150 mg by mouth two (2) times daily (with meals).  dabigatran etexilate (PRADAXA) 150 mg capsule Take 150 mg by mouth two (2) times a day.  pregabalin (LYRICA) 200 mg capsule Take 200 mg by mouth two (2) times a day.  cholecalciferol, vitamin d3, (VITAMIN D3) 400 unit cap Take 400 Units by mouth daily.  aspirin 81 mg chewable tablet Take 81 mg by mouth daily.  atorvastatin (LIPITOR) 40 mg tablet Take 1 Tab by mouth nightly. 30 Tab 12 Past History Past Medical History: 
Past Medical History:  
Diagnosis Date  Anxiety 1/22/2018  Arthritis OA  Asthma  Diabetes ([address]  Essential hypertension  GERD (gastroesophageal reflux disease)  Hypercholesterolemia 1/22/2018  Hypertension  Ill-defined condition   
 diverticulitis  Long-term use of high-risk medication 1/22/2018  Neuropathy  Other ill-defined conditions(799.89) IBS, spinal stenosis  Plantar fasciitis  Psychiatric disorder   
 depression, anxiety  Sleep apnea   
 uses CPAP  Type 2 diabetes mellitus with diabetic neuropathy, without long-term current use of insulin ([facility] [address] [facility] 6/5/2016 Past Surgical History: 
Past Surgical History:  
Procedure Laterality Date  CARDIAC SURG PROCEDURE UNLIST    
 stent  COLONOSCOPY N/A 3/14/2018 COLONOSCOPY performed by Armida Pate [place] [facility] [place]  HX CHOLECYSTECTOMY  11/15/2018  HX HYSTERECTOMY  HX PACEMAKER Family History: 
Family History Problem Relation Age of Onset Bae Arthritis-osteo Mother  Hypertension Mother  High Cholesterol Mother  Crohn's Disease Mother  Heart Disease Mother  Alcohol abuse Father  High Cholesterol Sister  Hypertension Sister  Thyroid Disease Sister  COPD Sister  High Cholesterol Brother  Hypertension Brother  COPD Brother  COPD Child  Inflammatory Bowel Dz Child Social History: 
Social History Tobacco Use  Smoking status: Never Smoker  Smokeless tobacco: Never Used Substance Use Topics  Alcohol use: No  
 Drug use: No  
 
 
Allergies: Allergies Allergen Reactions  Sulfa (Sulfonamide Antibiotics) Swelling  Amoxicillin Swelling Review of Systems Review of Systems Constitutional: Negative for chills, diaphoresis and fever. HENT: Negative for congestion, rhinorrhea and sore throat. Respiratory: Positive for chest tightness, shortness of breath and wheezing. Negative for cough. Reports orthopnea Cardiovascular: Negative for chest pain, palpitations and leg swelling. Gastrointestinal: Negative for abdominal pain, diarrhea, nausea and vomiting. Genitourinary: Negative for dysuria, hematuria and urgency. Musculoskeletal: Negative for myalgias. Denies calf pain Skin: Negative for rash. Neurological: Negative for dizziness, weakness, light-headedness, numbness and headaches. Denies tingling All other systems reviewed and are negative. Physical Exam  
Physical Exam  
Constitutional: She is oriented to person, place, and time. She appears well-developed and well-nourished. She appears distressed (mild). HENT:  
Head: Normocephalic and atraumatic. Eyes: Conjunctivae and EOM are normal. Pupils are equal, round, and reactive to light. Neck: Normal range of motion. Cardiovascular: Normal rate, regular rhythm and intact distal pulses. Pulmonary/Chest: No stridor. She is in respiratory distress (mild). She has wheezes (bilateral bases). On nasal cannula, increased work of breathing Abdominal: Soft. She exhibits no distension. There is no tenderness. Musculoskeletal: Normal range of motion. She exhibits edema (1+ pitting edema bilateral LEs). Neurological: She is alert and oriented to person, place, and time. Skin: Skin is warm. She is diaphoretic. Pt is diaphoretic Psychiatric: She has a normal mood and affect. Nursing note and vitals reviewed. Diagnostic Study Results Labs - Recent Results (from the past 12 hour(s)) CBC WITH AUTOMATED DIFF Collection Time: 01/27/19 10:16 AM  
Result Value Ref Range WBC 11.1 (H) 3.6 - 11.0 K/uL  
 RBC 4.86 3.80 - 5.20 M/uL  
 HGB 14.0 11.5 - 16.0 g/dL HCT 44.1 35.0 - 47.0 % MCV 90.7 80.0 - 99.0 FL  
 MCH 28.8 26.0 - 34.0 PG  
 MCHC 31.7 30.0 - 36.5 g/dL  
 RDW 17.9 (H) 11.5 - 14.5 % PLATELET 298 190 - 155 K/uL MPV 12.3 8.9 - 12.9 FL  
 NRBC 0.0 0  WBC ABSOLUTE NRBC 0.00 0.00 - 0.01 K/uL NEUTROPHILS 83 (H) 32 - 75 % LYMPHOCYTES 12 12 - 49 % MONOCYTES 2 (L) 5 - 13 % EOSINOPHILS 1 0 - 7 % BASOPHILS 0 0 - 1 % IMMATURE GRANULOCYTES 1 (H) 0.0 - 0.5 % ABS. NEUTROPHILS 9.2 (H) 1.8 - 8.0 K/UL  
 ABS. LYMPHOCYTES 1.4 0.8 - 3.5 K/UL ABS. MONOCYTES 0.2 0.0 - 1.0 K/UL  
 ABS. EOSINOPHILS 0.2 0.0 - 0.4 K/UL  
 ABS. BASOPHILS 0.0 0.0 - 0.1 K/UL  
 ABS. IMM. GRANS. 0.1 (H) 0.00 - 0.04 K/UL  
 DF AUTOMATED METABOLIC PANEL, COMPREHENSIVE Collection Time: 01/27/19 10:54 AM  
Result Value Ref Range Sodium 144 136 - 145 mmol/L Potassium 3.3 (L) 3.5 - 5.1 mmol/L Chloride 109 (H) 97 - 108 mmol/L  
 CO2 26 21 - 32 mmol/L Anion gap 9 5 - 15 mmol/L Glucose 164 (H) 65 - 100 mg/dL BUN 22 (H) 6 - 20 MG/DL Creatinine 1.35 (H) 0.55 - 1.02 MG/DL  
 BUN/Creatinine ratio 16 12 - 20 GFR est AA 48 (L) >60 ml/min/1.73m2 GFR est non-AA 39 (L) >60 ml/min/1.73m2 Calcium 8.9 8.5 - 10.1 MG/DL Bilirubin, total 0.9 0.2 - 1.0 MG/DL  
 ALT (SGPT) 24 12 - 78 U/L  
 AST (SGOT) 22 15 - 37 U/L Alk. phosphatase 191 (H) 45 - 117 U/L Protein, total 7.3 6.4 - 8.2 g/dL Albumin 3.5 3.5 - 5.0 g/dL Globulin 3.8 2.0 - 4.0 g/dL A-G Ratio 0.9 (L) 1.1 - 2.2    
CBC WITH AUTOMATED DIFF Collection Time: 01/27/19 10:54 AM  
Result Value Ref Range WBC 6.6 3.6 - 11.0 K/uL  
 RBC 4.41 3.80 - 5.20 M/uL  
 HGB 12.8 11.5 - 16.0 g/dL HCT 40.1 35.0 - 47.0 % MCV 90.9 80.0 - 99.0 FL  
 MCH 29.0 26.0 - 34.0 PG  
 MCHC 31.9 30.0 - 36.5 g/dL  
 RDW 17.6 (H) 11.5 - 14.5 % PLATELET 659 188 - 326 K/uL MPV 11.6 8.9 - 12.9 FL  
 NRBC 0.0 0  WBC ABSOLUTE NRBC 0.00 0.00 - 0.01 K/uL NEUTROPHILS 85 (H) 32 - 75 % LYMPHOCYTES 11 (L) 12 - 49 % MONOCYTES 2 (L) 5 - 13 % EOSINOPHILS 1 0 - 7 % BASOPHILS 0 0 - 1 % IMMATURE GRANULOCYTES 1 (H) 0.0 - 0.5 % ABS. NEUTROPHILS 5.6 1.8 - 8.0 K/UL  
 ABS. LYMPHOCYTES 0.7 (L) 0.8 - 3.5 K/UL  
 ABS. MONOCYTES 0.1 0.0 - 1.0 K/UL  
 ABS. EOSINOPHILS 0.1 0.0 - 0.4 K/UL  
 ABS. BASOPHILS 0.0 0.0 - 0.1 K/UL  
 ABS. IMM. GRANS. 0.1 (H) 0.00 - 0.04 K/UL  
 DF SMEAR SCANNED    
 RBC COMMENTS ANISOCYTOSIS 
1+ NT-PRO BNP Collection Time: 01/27/19 10:54 AM  
Result Value Ref Range NT pro-BNP 7,047 (H) 0 - 125 PG/ML  
TROPONIN I Collection Time: 01/27/19 10:54 AM  
Result Value Ref Range Troponin-I, Qt. <0.05 <0.05 ng/mL TROPONIN I Collection Time: 01/27/19  1:52 PM  
Result Value Ref Range Troponin-I, Qt. <0.05 <0.05 ng/mL Radiologic Studies -  
XR CHEST PORT Final Result IMPRESSION: Mild congestive failure suspected. No acute findings otherwise  
appear CXR Results  (Last 48 hours) 01/27/19 1032  XR CHEST PORT Final result Impression:  IMPRESSION: Mild congestive failure suspected. No acute findings otherwise  
appear Narrative:  EXAM: XR CHEST PORT INDICATION:  Midsternal chest pain since 23:009 and increasing wheezing and  
shortness of breath this morning. COMPARISON: Chest x-ray 11/12/2018. [facility] FINDINGS: AP and lateral radiographs of the chest demonstrate grossly clear  
lungs with mild pulmonary vascular congestion and no consolidative infiltrate,  
nodule, or mass evident. Pacemaker-ICD generator body projects over the left  
chest wall with intact appearing leads traversing in expected course. The  
cardiac and mediastinal contours are otherwise unremarkable. Chest wall  
structures and visualized upper abdomen show no acute interval change with  
degenerative spine change. Medical Decision Making I am the first provider for this patient. I reviewed the vital signs, available nursing notes, past medical history, past surgical history, family history and social history. Vital Signs-Reviewed the patient's vital signs. Patient Vitals for the past 12 hrs: 
 Temp Pulse Resp BP SpO2  
01/27/19 1115  80 17 (!) 165/98 94 % 01/27/19 1045  80 26 (!) 165/101 93 % 01/27/19 1010 98.1 °F (36.7 °C) 80 22 (!) 193/113 (!) 87 % Pulse Oximetry Analysis - 94% on nasal cannula Cardiac Monitor:  
Rate: 80 bpm 
Rhythm: Normal Sinus Rhythm EKG interpretation: (Preliminary) 1011 Rhythm: nml rate and ventricular-paced rhythm. Rate (approx.): 80; Axis: normal; MT interval: normal; QRS interval: normal ; ST/T wave: normal; Other findings: suspect unspecified pacemaker failure. Records Reviewed: Nursing Notes, Old Medical Records, Previous Radiology Studies and Previous Laboratory Studies Provider Notes (Medical Decision Making): DDx: CHF, pulmonary edema, PNA, influenza, ACS, anemia, electrolyte imbalance Pt presents with SOB, increased work of breathing, wheezing on exam. Plan for breathing treatment, steroids. Will check basic labs, trop, BNP, ekg, CXR. Given that she has not taken her diuretic and does have elevated BP on arrival, will also give dose of lasix ED Course:  
Initial assessment performed. The patients presenting problems have been discussed, and they are in agreement with the care plan formulated and outlined with them. I have encouraged them to ask questions as they arise throughout their visit. Progress Note: 
11:34 AM 
Pt looks better and feels better. She is no longer diaphoretic, no longer tachypnic. Pt is speaking in full sentences. Lungs now CTA. Progress Note: 
2:47 PM 
BNP noted to be elevated. Pt diuresed well in the ED Pt was able to ambulate with sats at 95%, no SOB Plan for discharge with outpatient follow up Will give inhaler here as patient is out. Medications  
albuterol (PROVENTIL HFA, VENTOLIN HFA, PROAIR HFA) inhaler 2 Puff (not administered)  
albuterol-ipratropium (DUO-NEB) 2.5 MG-0.5 MG/3 ML (3 mL Nebulization Given 1/27/19 1032) furosemide (LASIX) injection 40 mg (40 mg IntraVENous Given 1/27/19 1036) predniSONE (DELTASONE) tablet 60 mg (60 mg Oral Given 1/27/19 1138) Critical Care Time:  
0 minutes Disposition: 
Discharge Note: 
2:59 PM 
The pt is ready for discharge.  The pt's signs, symptoms, diagnosis, and discharge instructions have been discussed and pt has conveyed their understanding. The pt is to follow up as recommended or return to ER should their symptoms worsen. Plan has been discussed and pt is in agreement. PLAN: 
1. Current Discharge Medication List  
  
START taking these medications Details  
predniSONE (DELTASONE) 20 mg tablet Take 60 mg by mouth daily for 4 days. Qty: 12 Tab, Refills: 0  
  
  
 
2. Follow-up Information Follow up With Specialties Details Why Contact Info Andie Pavon MD Cardiology Schedule an appointment as soon as possible for a visit  [id number] [facility] 
[phone] hospitals EMERGENCY DEPT Emergency Medicine  As needed, If symptoms worsen [address] [facility] 
[phone] Viry Yañez MD Internal Medicine Schedule an appointment as soon as possible for a visit  [address] [facility] 
[phone] Return to ED if worse Diagnosis Clinical Impression: 1. Acute congestive heart failure, unspecified heart failure type ([address] 2. Mild intermittent asthma with acute exacerbation Attestations: This note is prepared by Lesley Jeong. Rom Torres, acting as Scribe for [employer]. Emerald Nolen MD. JAXON Nolen MD: The scribe's documentation has been prepared under my direction and personally reviewed by me in its entirety. I confirm that the note above accurately reflects all work, treatment, procedures, and medical decision making performed by me. This note will not be viewable in [address]

## 2019-01-27 NOTE — ED NOTES
Pt ambulated with pulse ox - sats remained 93-95% on RA. Pt denies any increased SOB with activity. MD to be notified.

## 2019-01-27 NOTE — DISCHARGE INSTRUCTIONS
Patient Education        Heart Failure: Care Instructions  Your Care Instructions    Heart failure occurs when your heart does not pump as much blood as the body needs. Failure does not mean that the heart has stopped pumping but rather that it is not pumping as well as it should. Over time, this causes fluid buildup in your lungs and other parts of your body. Fluid buildup can cause shortness of breath, fatigue, swollen ankles, and other problems. By taking medicines regularly, reducing sodium (salt) in your diet, checking your weight every day, and making lifestyle changes, you can feel better and live longer. Follow-up care is a key part of your treatment and safety. Be sure to make and go to all appointments, and call your doctor if you are having problems. It's also a good idea to know your test results and keep a list of the medicines you take. How can you care for yourself at home? Medicines    · Be safe with medicines. Take your medicines exactly as prescribed. Call your doctor if you think you are having a problem with your medicine.     · Do not take any vitamins, over-the-counter medicine, or herbal products without talking to your doctor first. Reche Baseman not take ibuprofen (Advil or Motrin) and naproxen (Aleve) without talking to your doctor first. They could make your heart failure worse.     · You may be taking some of the following medicine. ? Beta-blockers can slow heart rate, decrease blood pressure, and improve your condition. Taking a beta-blocker may lower your chance of needing to be hospitalized. ? Angiotensin-converting enzyme inhibitors (ACEIs) reduce the heart's workload, lower blood pressure, and reduce swelling. Taking an ACEI may lower your chance of needing to be hospitalized again. ? Angiotensin II receptor blockers (ARBs) work like ACEIs. Your doctor may prescribe them instead of ACEIs. ? Diuretics, also called water pills, reduce swelling. ?  Potassium supplements replace this important mineral, which is sometimes lost with diuretics. ? Aspirin and other blood thinners prevent blood clots, which can cause a stroke or heart attack.    You will get more details on the specific medicines your doctor prescribes. Diet    · Your doctor may suggest that you limit sodium to 2,000 milligrams (mg) a day or less. That is less than 1 teaspoon of salt a day, including all the salt you eat in cooking or in packaged foods. People get most of their sodium from processed foods. Fast food and restaurant meals also tend to be very high in sodium.     · Ask your doctor how much liquid you can drink each day. You may have to limit liquids.    Weight    · Weigh yourself without clothing at the same time each day. Record your weight. Call your doctor if you have a sudden weight gain, such as more than 2 to 3 pounds in a day or 5 pounds in a week. (Your doctor may suggest a different range of weight gain.) A sudden weight gain may mean that your heart failure is getting worse.    Activity level    · Start light exercise (if your doctor says it is okay). Even if you can only do a small amount, exercise will help you get stronger, have more energy, and manage your weight and your stress. Walking is an easy way to get exercise. Start out by walking a little more than you did before. Bit by bit, increase the amount you walk.     · When you exercise, watch for signs that your heart is working too hard. You are pushing yourself too hard if you cannot talk while you are exercising. If you become short of breath or dizzy or have chest pain, stop, sit down, and rest.     · If you feel \"wiped out\" the day after you exercise, walk slower or for a shorter distance until you can work up to a better pace.     · Get enough rest at night. Sleeping with 1 or 2 pillows under your upper body and head may help you breathe easier.    Lifestyle changes    · Do not smoke. Smoking can make a heart condition worse.  If you need help quitting, talk to your doctor about stop-smoking programs and medicines. These can increase your chances of quitting for good. Quitting smoking may be the most important step you can take to protect your heart.     · Limit alcohol to 2 drinks a day for men and 1 drink a day for women. Too much alcohol can cause health problems.     · Avoid getting sick from colds and the flu. Get a pneumococcal vaccine shot. If you have had one before, ask your doctor whether you need another dose. Get a flu shot each year. If you must be around people with colds or the flu, wash your hands often. When should you call for help? Call 911 if you have symptoms of sudden heart failure such as:    · You have severe trouble breathing.     · You cough up pink, foamy mucus.     · You have a new irregular or rapid heartbeat.    Call your doctor now or seek immediate medical care if:    · You have new or increased shortness of breath.     · You are dizzy or lightheaded, or you feel like you may faint.     · You have sudden weight gain, such as more than 2 to 3 pounds in a day or 5 pounds in a week. (Your doctor may suggest a different range of weight gain.)     · You have increased swelling in your legs, ankles, or feet.     · You are suddenly so tired or weak that you cannot do your usual activities.    Watch closely for changes in your health, and be sure to contact your doctor if you develop new symptoms. Where can you learn more? Go to http://dawood-vera.info/. Enter S188 in the search box to learn more about \"Heart Failure: Care Instructions. \"  Current as of: July 22, 2018  Content Version: 11.9  © 6571-8473 [employer], Incorporated. Care instructions adapted under license by [employer] (which disclaims liability or warranty for this information).  If you have questions about a medical condition or this instruction, always ask your healthcare professional. Tunde Matthews any warranty or liability for your use of this information.

## 2019-01-28 ENCOUNTER — PATIENT OUTREACH (OUTPATIENT)
Dept: INTERNAL MEDICINE CLINIC | Age: 69
End: 2019-01-28

## 2019-01-28 LAB
ATRIAL RATE: 80 BPM
CALCULATED R AXIS, ECG10: -102 DEGREES
CALCULATED T AXIS, ECG11: 54 DEGREES
DIAGNOSIS, 93000: NORMAL
Q-T INTERVAL, ECG07: 468 MS
QRS DURATION, ECG06: 160 MS
QTC CALCULATION (BEZET), ECG08: 539 MS
VENTRICULAR RATE, ECG03: 80 BPM

## 2019-01-28 NOTE — PROGRESS NOTES
1/28/19- Patient calls in to say she was seen in ED 1/27/19 for bronchitis and given prednisone and an inhaler. She has a f/u on 2/6/19 with PCP however wonders if she should see PCP prior to that time.   Appointment given to patient for 1/31/19 and told her to call prior to that time if she is not feeling better so we can work her in sooner if necessary / vs

## 2019-01-31 ENCOUNTER — OFFICE VISIT (OUTPATIENT)
Dept: INTERNAL MEDICINE CLINIC | Age: 69
End: 2019-01-31

## 2019-01-31 VITALS
SYSTOLIC BLOOD PRESSURE: 112 MMHG | HEART RATE: 96 BPM | RESPIRATION RATE: 22 BRPM | HEIGHT: 69 IN | TEMPERATURE: 97.5 F | OXYGEN SATURATION: 98 % | WEIGHT: 235 LBS | BODY MASS INDEX: 34.8 KG/M2 | DIASTOLIC BLOOD PRESSURE: 76 MMHG

## 2019-01-31 DIAGNOSIS — J45.20 MILD INTERMITTENT ASTHMA WITHOUT COMPLICATION: ICD-10-CM

## 2019-01-31 DIAGNOSIS — I25.5 ISCHEMIC CARDIOMYOPATHY: ICD-10-CM

## 2019-01-31 DIAGNOSIS — I50.23 ACUTE ON CHRONIC SYSTOLIC CHF (CONGESTIVE HEART FAILURE) (HCC): Primary | ICD-10-CM

## 2019-01-31 PROBLEM — J45.909 ASTHMA: Status: ACTIVE | Noted: 2019-01-31

## 2019-01-31 NOTE — PROGRESS NOTES
This note will not be viewable in [address] Corbin Weston is a 79 y.o. female and presents with Asthma (ER follow up) and Cough Annamary Ripa Subjective: 
Mrs. Dario Bowling presents to the office today and transition of care subsequent to an emergency room visit at [facility] on 1/27 when she presented with chest pain and shortness of breath. At that time she was hypoxic with O2 sats around 87%. Her evaluation suggested that she had some acute on chronic systolic heart failure due to her underlying ischemic cardiomyopathy along with wheezing related to underlying asthma. She was given a diuretic and jet nebulizer treatment with improvement in her breathing and O2 sats. Her chest x-ray was clear and her chest pain resolved. She was subsequently returned home and has been fine since that time. She is given a Ventolin inhaler to use on a as needed basis. She has occasionally noted some wheezing but has had no sputum production or signs of infection. She does check her weight on a daily basis and there is been no fluctuation in. She tries to restrict her salt in regards to her diet. She has had no recent PND or orthopnea. Past Medical History:  
Diagnosis Date  Anxiety 1/22/2018  Arthritis OA  Asthma  Diabetes ([address]  Essential hypertension  GERD (gastroesophageal reflux disease)  Hypercholesterolemia 1/22/2018  Hypertension  Ill-defined condition   
 diverticulitis  Long-term use of high-risk medication 1/22/2018  Neuropathy  Other ill-defined conditions(799.89) IBS, spinal stenosis  Plantar fasciitis  Psychiatric disorder   
 depression, anxiety  Sleep apnea   
 uses CPAP  Type 2 diabetes mellitus with diabetic neuropathy, without long-term current use of insulin ([address] 6/5/2016 Past Surgical History:  
Procedure Laterality Date  CARDIAC SURG PROCEDURE UNLIST    
 stent  COLONOSCOPY N/A 3/14/2018 COLONOSCOPY performed by Carmina Oro MD at [address]  HX CHOLECYSTECTOMY  11/15/2018  HX HYSTERECTOMY  HX PACEMAKER Allergies Allergen Reactions  Sulfa (Sulfonamide Antibiotics) Swelling  Amoxicillin Swelling Current Outpatient Medications Medication Sig Dispense Refill  potassium chloride SR (KLOR-CON 10) 10 mEq tablet Take 1 Tab by mouth daily. 90 Tab 0  
 bumetanide (BUMEX) 2 mg tablet Take 1 Tab by mouth daily. 90 Tab 3  clonazePAM (KLONOPIN) 0.5 mg tablet TAKE 1 TABLET EVERY NIGHT. MAX DAILY DOSE OF 0.5MG. 90 Tab 1  
 amLODIPine (NORVASC) 2.5 mg tablet Take 1 Tab by mouth daily. 90 Tab 3  
 omeprazole (PRILOSEC) 40 mg capsule Take 1 Cap by mouth daily. 90 Cap 3  
 lisinopril (PRINIVIL, ZESTRIL) 10 mg tablet Take 1 Tab by mouth daily. 30 Tab prn  metoprolol succinate (TOPROL-XL) 100 mg tablet Take 1 Tab by mouth daily. 30 Tab prn  acetaminophen (TYLENOL EXTRA STRENGTH) 500 mg tablet Take 1,000 mg by mouth every eight (8) hours as needed for Pain (Headache).  lidocaine (ASPERCREME, LIDOCAINE,) 4 % topical cream Apply  to affected area two (2) times daily as needed for Pain (Knee pain).  sodium chloride (AYR SALINE) 0.65 % nasal squeeze bottle 1 [place] by Both Nostrils route two (2) times a day.  conjugated estrogens (PREMARIN) 0.625 mg/gram vaginal cream Insert 0.5 g into vagina two (2) days a week. Tuesdays and Thursdays  venlafaxine-SR (EFFEXOR XR) 150 mg capsule Take 150 mg by mouth two (2) times daily (with meals).  dabigatran etexilate (PRADAXA) 150 mg capsule Take 150 mg by mouth two (2) times a day.  pregabalin (LYRICA) 200 mg capsule Take 200 mg by mouth two (2) times a day.  cholecalciferol, vitamin d3, (VITAMIN D3) 400 unit cap Take 400 Units by mouth daily.  aspirin 81 mg chewable tablet Take 81 mg by mouth daily.  atorvastatin (LIPITOR) 40 mg tablet Take 1 Tab by mouth nightly. 30 Tab 12 Social History Socioeconomic History  Marital status:  Spouse name: Not on file  Number of children: Not on file  Years of education: Not on file  Highest education level: Not on file Tobacco Use  Smoking status: Never Smoker  Smokeless tobacco: Never Used Substance and Sexual Activity  Alcohol use: No  
 Drug use: No  
 
Family History Problem Relation Age of Onset [place] Arthritis-osteo Mother  Hypertension Mother  High Cholesterol Mother  Crohn's Disease Mother  Heart Disease Mother  Alcohol abuse Father  High Cholesterol Sister  Hypertension Sister  Thyroid Disease Sister  COPD Sister  High Cholesterol Brother  Hypertension Brother  COPD Brother  COPD Child  Inflammatory Bowel Dz Child Health Maintenance Topic Date Due  
 Hepatitis C Screening  1950  DTaP/Tdap/Td series (1 - Tdap) 09/13/1973  Shingrix Vaccine Age 50> (1 of 2) 09/13/2002  BREAST CANCER SCRN MAMMOGRAM  09/13/2002  FOBT Q 1 YEAR AGE 50-75  09/13/2002  Bone Densitometry (Dexa) Screening  09/13/2017  Pneumococcal 65+ Low/Medium Risk (1 of 2 - PCV13) 09/13/2017  MEDICARE YEARLY EXAM  03/14/2018  HEMOGLOBIN A1C Q6M  02/01/2019  
 FOOT EXAM Q1  08/01/2019  MICROALBUMIN Q1  08/01/2019  LIPID PANEL Q1  08/01/2019  GLAUCOMA SCREENING Q2Y  04/18/2020  
 EYE EXAM RETINAL OR DILATED  04/18/2020  Influenza Age 5 to Adult  Completed Review of Systems Constitutional: negative for fevers, chills, anorexia and weight loss Eyes:   negative for visual disturbance and irritation ENT:   negative for tinnitus,sore throat,nasal congestion,ear pain,hoarseness Respiratory:  negative for cough, hemoptysis, dyspnea,wheezing CV:   negative for chest pain, palpitations, lower extremity edema GI:   negative for nausea, vomiting, diarrhea, abdominal pain,melena Endo:               negative for polyuria,polydipsia,polyphagia,heat intolerance Genitourinary: negative for frequency, dysuria and hematuria Integumentary: negative for rash and pruritus Hematologic:  negative for easy bruising and gum/nose bleeding Musculoskel: negative for myalgias, arthralgias, back pain, muscle weakness, joint pain Neurological:  negative for headaches, dizziness, vertigo, memory problems and gait Behavl/Psych: negative for feelings of anxiety, depression, mood changes ROS otherwise negative Objective: 
Visit Vitals /76 (BP 1 Location: Left arm, BP Patient Position: Sitting) Pulse 96 Temp 97.5 °F (36.4 °C) (Oral) Resp 22 Ht 5' 9\" (1.753 m) Wt 235 lb (106.6 kg) SpO2 98% BMI 34.70 kg/m² Body mass index is 34.7 kg/m². Physical Exam:  
General appearance - alert, well appearing, and in no distress Mental status - alert, oriented to person, place, and time EYE-VIVEK, EOMI,conjunctiva normal bilaterally, lids normal 
ENT-ENT exam normal, no neck nodes or sinus tenderness Nose - normal and patent, no erythema,  Or discharge Mouth - mucous membranes moist, pharynx normal without lesions Neck - supple, no significant adenopathy or bruit Chest - clear to auscultation, no wheezes, rales or rhonchi. Heart - normal rate, regular rhythm, normal S1, S2, no murmurs, rubs, clicks or gallops Abdomen - soft, nontender, nondistended, no masses or organomegaly Lymph- no adenopathy palpable Ext-peripheral pulses normal, no pedal edema, no clubbing or cyanosis Skin-Warm and dry. no hyperpigmentation, vitiligo, or suspicious lesions Neuro -alert, oriented, normal speech, no focal findings or movement disorder noted Assessment/Plan: 
Diagnoses and all orders for this visit: 
 
Acute on chronic systolic CHF (congestive heart failure) ([facility] Mild intermittent asthma without complication Ischemic cardiomyopathy Other instructions: The patient's medications were reviewed and reconciled. No change in medical regimen is made. As noted she does have a Ventolin inhaler which she is encouraged to use whenever she has wheezing. A low-sodium diet is strongly encouraged Continue daily weights and she understands that she is to should she gain 5 pounds. Emergency room notes from 1/27 along with review of labs, x-ray and EKG were performed during the course of this office visit Follow-up here otherwise in 3 months time Follow-up Disposition: 
Return for As previously scheduled. I have reviewed with the patient details of the assessment and plan and all questions were answered. Relevent patient education was performed. The most recent lab findings were reviewed with the patient. An After Visit Summary was printed and given to the patient.  
 
Briana Camejo MD

## 2019-01-31 NOTE — PROGRESS NOTES
Andi Faulkner is a 79 y.o. female presenting for Asthma (ER follow up) and Cough Frutoso Golder 1. Have you been to the ER, urgent care clinic since your last visit? Hospitalized since your last visit? Yes [facility]: [facility] Reason for visit: asthma 2. Have you seen or consulted any other health care providers outside of the [address] [place] [address] since your last visit? Include any pap smears or colon screening. No 
 
Fall Risk Assessment, last 12 mths 1/31/2019 Able to walk? Yes Fall in past 12 months? Yes Fall with injury? No  
Number of falls in past 12 months 1 Fall Risk Score 1 Abuse Screening Questionnaire 1/31/2019 Do you ever feel afraid of your partner? Hira Jordan Are you in a relationship with someone who physically or mentally threatens you? Hira Jordan Is it safe for you to go home? Y  
 
 
PHQ over the last two weeks 1/31/2019 Little interest or pleasure in doing things Not at all Feeling down, depressed, irritable, or hopeless Not at all Total Score PHQ 2 0 Trouble falling or staying asleep, or sleeping too much Not at all Feeling tired or having little energy More than half the days Poor appetite, weight loss, or overeating Not at all Feeling bad about yourself - or that you are a failure or have let yourself or your family down Not at all Trouble concentrating on things such as school, work, reading, or watching TV Not at all Moving or speaking so slowly that other people could have noticed; or the opposite being so fidgety that others notice Not at all Thoughts of being better off dead, or hurting yourself in some way Not at all PHQ 9 Score 2 How difficult have these problems made it for you to do your work, take care of your home and get along with others Not difficult at all There are no discontinued medications.

## 2019-01-31 NOTE — PATIENT INSTRUCTIONS
Asthma Attack: Care Instructions Your Care Instructions During an asthma attack, the airways swell and narrow. This makes it hard to breathe. Severe asthma attacks can be life-threatening, but you can help prevent them by keeping your asthma under control and treating symptoms before they get bad. Symptoms include being short of breath, having chest tightness, coughing, and wheezing. Noting and treating these symptoms can also help you avoid future trips to the emergency room. The doctor has checked you carefully, but problems can develop later. If you notice any problems or new symptoms, get medical treatment right away. Follow-up care is a key part of your treatment and safety. Be sure to make and go to all appointments, and call your doctor if you are having problems. It's also a good idea to know your test results and keep a list of the medicines you take. How can you care for yourself at home? · Follow your asthma action plan to prevent and treat attacks. If you don't have an asthma action plan, work with your doctor to create one. · Take your asthma medicines exactly as prescribed. Talk to your doctor right away if you have any questions about how to take them. ? Use your quick-relief medicine when you have symptoms of an attack. Quick-relief medicine is usually an albuterol inhaler. Some people need to use quick-relief medicine before they exercise. ? Take your controller medicine every day, not just when you have symptoms. Controller medicine is usually an inhaled corticosteroid. The goal is to prevent problems before they occur. Don't use your controller medicine to treat an attack that has already started. It doesn't work fast enough to help. ? If your doctor prescribed corticosteroid pills to use during an attack, take them exactly as prescribed. It may take hours for the pills to work, but they may make the episode shorter and help you breathe better. ? Keep your quick-relief medicine with you at all times. · Talk to your doctor before using other medicines. Some medicines, such as aspirin, can cause asthma attacks in some people. · If you have a peak flow meter, use it to check how well you are breathing. This can help you predict when an asthma attack is going to occur. Then you can take medicine to prevent the asthma attack or make it less severe. · Do not smoke or allow others to smoke around you. Avoid smoky places. Smoking makes asthma worse. If you need help quitting, talk to your doctor about stop-smoking programs and medicines. These can increase your chances of quitting for good. · Learn what triggers an asthma attack for you, and avoid the triggers when you can. Common triggers include colds, smoke, air pollution, dust, pollen, mold, pets, cockroaches, stress, and cold air. · Avoid colds and the flu. Get a pneumococcal vaccine shot. If you have had one before, ask your doctor if you need a second dose. Get a flu vaccine every fall. If you must be around people with colds or the flu, wash your hands often. When should you call for help? Call 911 anytime you think you may need emergency care. For example, call if: 
  · You have severe trouble breathing.  
 Call your doctor now or seek immediate medical care if: 
  · Your symptoms do not get better after you have followed your asthma action plan.  
  · You have new or worse trouble breathing.  
  · Your coughing and wheezing get worse.  
  · You cough up dark brown or bloody mucus (sputum).  
  · You have a new or higher fever.  
 Watch closely for changes in your health, and be sure to contact your doctor if: 
  · You need to use quick-relief medicine on more than 2 days a week (unless it is just for exercise).  
  · You cough more deeply or more often, especially if you notice more mucus or a change in the color of your mucus.  
  · You are not getting better as expected. Where can you learn more? Go to http://dawood-vera.info/. Enter D499 in the search box to learn more about \"Asthma Attack: Care Instructions. \" Current as of: September 5, 2018 Content Version: 11.9 © 6395-0324 [employer]. Care instructions adapted under license by [employer] (which disclaims liability or warranty for this information). If you have questions about a medical condition or this instruction, always ask your healthcare professional. [place] [address] any warranty or liability for your use of this information.

## 2019-02-13 ENCOUNTER — PATIENT OUTREACH (OUTPATIENT)
Dept: INTERNAL MEDICINE CLINIC | Age: 69
End: 2019-02-13

## 2019-02-19 ENCOUNTER — HOSPITAL ENCOUNTER (OUTPATIENT)
Dept: GENERAL RADIOLOGY | Age: 69
Discharge: HOME | End: 2019-02-19
Payer: MEDICARE

## 2019-02-19 DIAGNOSIS — Z01.810 PRE-OPERATIVE CARDIOVASCULAR EXAMINATION: ICD-10-CM

## 2019-02-19 PROCEDURE — 71046 X-RAY EXAM CHEST 2 VIEWS: CPT

## 2019-02-22 ENCOUNTER — HOSPITAL ENCOUNTER (OUTPATIENT)
Age: 69
Discharge: HOME | End: 2019-02-22
Attending: INTERNAL MEDICINE | Admitting: INTERNAL MEDICINE
Payer: MEDICARE

## 2019-02-22 VITALS
SYSTOLIC BLOOD PRESSURE: 153 MMHG | HEART RATE: 85 BPM | WEIGHT: 237 LBS | BODY MASS INDEX: 35.1 KG/M2 | HEIGHT: 69 IN | OXYGEN SATURATION: 97 % | DIASTOLIC BLOOD PRESSURE: 96 MMHG | RESPIRATION RATE: 18 BRPM | TEMPERATURE: 97.7 F

## 2019-02-22 DIAGNOSIS — I24.9 ACS (ACUTE CORONARY SYNDROME) (HCC): ICD-10-CM

## 2019-02-22 LAB
END DIASTOLIC PRESSURE: 20
GLUCOSE BLD STRIP.AUTO-MCNC: 133 MG/DL (ref 65–100)
GLUCOSE BLD STRIP.AUTO-MCNC: 155 MG/DL (ref 65–100)
SERVICE CMNT-IMP: ABNORMAL
SERVICE CMNT-IMP: ABNORMAL

## 2019-02-22 PROCEDURE — 74011250636 HC RX REV CODE- 250/636: Performed by: INTERNAL MEDICINE

## 2019-02-22 PROCEDURE — 77010033678 HC OXYGEN DAILY

## 2019-02-22 PROCEDURE — 99152 MOD SED SAME PHYS/QHP 5/>YRS: CPT | Performed by: INTERNAL MEDICINE

## 2019-02-22 PROCEDURE — 82962 GLUCOSE BLOOD TEST: CPT

## 2019-02-22 PROCEDURE — 77030004549 HC CATH ANGI DX PRF MRTM -A: Performed by: INTERNAL MEDICINE

## 2019-02-22 PROCEDURE — 77030028837 HC SYR ANGI PWR INJ COEU -A: Performed by: INTERNAL MEDICINE

## 2019-02-22 PROCEDURE — 74011250636 HC RX REV CODE- 250/636

## 2019-02-22 PROCEDURE — 93458 L HRT ARTERY/VENTRICLE ANGIO: CPT | Performed by: INTERNAL MEDICINE

## 2019-02-22 PROCEDURE — C1894 INTRO/SHEATH, NON-LASER: HCPCS | Performed by: INTERNAL MEDICINE

## 2019-02-22 PROCEDURE — 74011636320 HC RX REV CODE- 636/320: Performed by: INTERNAL MEDICINE

## 2019-02-22 PROCEDURE — 74011250637 HC RX REV CODE- 250/637: Performed by: INTERNAL MEDICINE

## 2019-02-22 PROCEDURE — C1769 GUIDE WIRE: HCPCS | Performed by: INTERNAL MEDICINE

## 2019-02-22 PROCEDURE — 77030029065 HC DRSG HEMO QCLOT ZMED -B: Performed by: INTERNAL MEDICINE

## 2019-02-22 PROCEDURE — 99153 MOD SED SAME PHYS/QHP EA: CPT | Performed by: INTERNAL MEDICINE

## 2019-02-22 RX ORDER — SODIUM CHLORIDE 0.9 % (FLUSH) 0.9 %
5-40 SYRINGE (ML) INJECTION EVERY 8 HOURS
Status: DISCONTINUED | OUTPATIENT
Start: 2019-02-22 | End: 2019-02-22 | Stop reason: HOSPADM

## 2019-02-22 RX ORDER — DABIGATRAN ETEXILATE 150 MG/1
150 CAPSULE ORAL 2 TIMES DAILY
Qty: 60 CAP | Refills: 12 | Status: ON HOLD
Start: 2019-02-24 | End: 2021-10-06

## 2019-02-22 RX ORDER — HEPARIN SODIUM 200 [USP'U]/100ML
INJECTION, SOLUTION INTRAVENOUS
Status: COMPLETED | OUTPATIENT
Start: 2019-02-22 | End: 2019-02-22

## 2019-02-22 RX ORDER — SODIUM CHLORIDE 9 MG/ML
100 INJECTION, SOLUTION INTRAVENOUS CONTINUOUS
Status: DISCONTINUED | OUTPATIENT
Start: 2019-02-22 | End: 2019-02-22 | Stop reason: HOSPADM

## 2019-02-22 RX ORDER — MIDAZOLAM HYDROCHLORIDE 1 MG/ML
INJECTION, SOLUTION INTRAMUSCULAR; INTRAVENOUS AS NEEDED
Status: DISCONTINUED | OUTPATIENT
Start: 2019-02-22 | End: 2019-02-22 | Stop reason: HOSPADM

## 2019-02-22 RX ORDER — METOPROLOL SUCCINATE 50 MG/1
100 TABLET, EXTENDED RELEASE ORAL
Status: COMPLETED | OUTPATIENT
Start: 2019-02-22 | End: 2019-02-22

## 2019-02-22 RX ORDER — AMLODIPINE BESYLATE 2.5 MG/1
2.5 TABLET ORAL
Status: COMPLETED | OUTPATIENT
Start: 2019-02-22 | End: 2019-02-22

## 2019-02-22 RX ORDER — LIDOCAINE HYDROCHLORIDE 10 MG/ML
INJECTION, SOLUTION EPIDURAL; INFILTRATION; INTRACAUDAL; PERINEURAL AS NEEDED
Status: DISCONTINUED | OUTPATIENT
Start: 2019-02-22 | End: 2019-02-22 | Stop reason: HOSPADM

## 2019-02-22 RX ORDER — FENTANYL CITRATE 50 UG/ML
INJECTION, SOLUTION INTRAMUSCULAR; INTRAVENOUS AS NEEDED
Status: DISCONTINUED | OUTPATIENT
Start: 2019-02-22 | End: 2019-02-22 | Stop reason: HOSPADM

## 2019-02-22 RX ORDER — NALOXONE HYDROCHLORIDE 0.4 MG/ML
0.4 INJECTION, SOLUTION INTRAMUSCULAR; INTRAVENOUS; SUBCUTANEOUS AS NEEDED
Status: DISCONTINUED | OUTPATIENT
Start: 2019-02-22 | End: 2019-02-22 | Stop reason: HOSPADM

## 2019-02-22 RX ORDER — SODIUM CHLORIDE 0.9 % (FLUSH) 0.9 %
5-40 SYRINGE (ML) INJECTION AS NEEDED
Status: DISCONTINUED | OUTPATIENT
Start: 2019-02-22 | End: 2019-02-22 | Stop reason: HOSPADM

## 2019-02-22 RX ORDER — GUAIFENESIN 100 MG/5ML
81 LIQUID (ML) ORAL
Status: COMPLETED | OUTPATIENT
Start: 2019-02-22 | End: 2019-02-22

## 2019-02-22 RX ORDER — DIPHENHYDRAMINE HYDROCHLORIDE 50 MG/ML
25 INJECTION, SOLUTION INTRAMUSCULAR; INTRAVENOUS
Status: DISCONTINUED | OUTPATIENT
Start: 2019-02-22 | End: 2019-02-22 | Stop reason: HOSPADM

## 2019-02-22 RX ORDER — SODIUM CHLORIDE 9 MG/ML
150 INJECTION, SOLUTION INTRAVENOUS CONTINUOUS
Status: DISCONTINUED | OUTPATIENT
Start: 2019-02-22 | End: 2019-02-22 | Stop reason: HOSPADM

## 2019-02-22 RX ADMIN — METOPROLOL SUCCINATE 100 MG: 50 TABLET, EXTENDED RELEASE ORAL at 11:32

## 2019-02-22 RX ADMIN — SODIUM CHLORIDE 150 ML/HR: 900 INJECTION, SOLUTION INTRAVENOUS at 10:40

## 2019-02-22 RX ADMIN — NITROGLYCERIN 1 INCH: 20 OINTMENT TOPICAL at 11:32

## 2019-02-22 RX ADMIN — AMLODIPINE BESYLATE 2.5 MG: 2.5 TABLET ORAL at 11:32

## 2019-02-22 RX ADMIN — ASPIRIN 81 MG 81 MG: 81 TABLET ORAL at 11:32

## 2019-02-22 NOTE — PROGRESS NOTES
Sheath pulled at 1410 from right groin. Pressure held for ten minutes. No hematoma or bleeding at site.

## 2019-02-22 NOTE — PROGRESS NOTES
2/22/2019 6:14 PM  Patient without complaints. Last VS:   Visit Vitals  /80   Pulse 80   Temp 97.7 °F (36.5 °C)   Resp 18   Ht 5' 9\" (1.753 m)   Wt 107.5 kg (237 lb)   SpO2 97%   BMI 35.00 kg/m²     Cath site without hematoma, bleeding or new bruit. Distal pulses at baseline. Continue current plan of care. Results of cath discussed with patient and previously with her .

## 2019-02-22 NOTE — Clinical Note
TRANSFER - OUT REPORT:  
 
Verbal report given to: Ailyn Carvajal RN. Report consisted of patient's Situation, Background, Assessment and  
Recommendations(SBAR). Opportunity for questions and clarification was provided. Patient transported to: Recovery.

## 2019-02-22 NOTE — PROGRESS NOTES
Maria Ines Viveros informed of pt's elevated BP, orders received (see MAR). Will continue to monitor. Pt denies any pain at this time.

## 2019-02-22 NOTE — Clinical Note
Candice Skaggs relief circulator - case medication count: 
Given 2mg versed, 50mg fentanyl Remaining handed off 3mg versed, 50mg fentanyl

## 2019-02-23 NOTE — PROGRESS NOTES
Bedrest complete. Assisted patient with walking in the singh. No signs or symptoms of chest pain, shortness of breath, or dizziness. Tolerated well. VSS.

## 2019-03-04 RX ORDER — POTASSIUM CHLORIDE 750 MG/1
TABLET, FILM COATED, EXTENDED RELEASE ORAL
Qty: 90 TAB | Refills: 0 | Status: SHIPPED | OUTPATIENT
Start: 2019-03-04 | End: 2019-05-14 | Stop reason: SDUPTHER

## 2019-03-25 ENCOUNTER — TELEPHONE (OUTPATIENT)
Dept: INTERNAL MEDICINE CLINIC | Age: 69
End: 2019-03-25

## 2019-03-25 RX ORDER — ALBUTEROL SULFATE 90 UG/1
1 AEROSOL, METERED RESPIRATORY (INHALATION) 2 TIMES DAILY
Qty: 1 INHALER | Refills: 6 | Status: SHIPPED | OUTPATIENT
Start: 2019-03-25 | End: 2019-08-17 | Stop reason: SDUPTHER

## 2019-03-25 NOTE — TELEPHONE ENCOUNTER
Patient phoned states she ws given a ventolin inhaler in the hospital to help with wheezing and states she feels like she might need it at night. She states he has had ProAir in the past. Can one of these be [facility] in to the [facility].

## 2019-03-25 NOTE — TELEPHONE ENCOUNTER
Requested Prescriptions     Pending Prescriptions Disp Refills    albuterol (PROVENTIL HFA, VENTOLIN HFA, PROAIR HFA) 90 mcg/actuation inhaler 1 Inhaler 6     Sig: Take 1 Puff by inhalation two (2) times a day.

## 2019-04-27 ENCOUNTER — HOSPITAL ENCOUNTER (EMERGENCY)
Age: 69
Discharge: HOME | End: 2019-04-27
Attending: EMERGENCY MEDICINE
Payer: MEDICARE

## 2019-04-27 ENCOUNTER — APPOINTMENT (OUTPATIENT)
Dept: GENERAL RADIOLOGY | Age: 69
End: 2019-04-27
Attending: EMERGENCY MEDICINE
Payer: MEDICARE

## 2019-04-27 VITALS
DIASTOLIC BLOOD PRESSURE: 119 MMHG | OXYGEN SATURATION: 91 % | TEMPERATURE: 97.5 F | BODY MASS INDEX: 36.92 KG/M2 | WEIGHT: 250 LBS | RESPIRATION RATE: 23 BRPM | SYSTOLIC BLOOD PRESSURE: 156 MMHG | HEART RATE: 80 BPM

## 2019-04-27 DIAGNOSIS — R07.9 CHEST PAIN, UNSPECIFIED TYPE: Primary | ICD-10-CM

## 2019-04-27 LAB
ALBUMIN SERPL-MCNC: 3.5 G/DL (ref 3.5–5)
ALBUMIN/GLOB SERPL: 1 {RATIO} (ref 1.1–2.2)
ALP SERPL-CCNC: 146 U/L (ref 45–117)
ALT SERPL-CCNC: 16 U/L (ref 12–78)
ANION GAP SERPL CALC-SCNC: 4 MMOL/L (ref 5–15)
AST SERPL-CCNC: 17 U/L (ref 15–37)
BASOPHILS # BLD: 0 K/UL (ref 0–0.1)
BASOPHILS NFR BLD: 0 % (ref 0–1)
BILIRUB SERPL-MCNC: 1 MG/DL (ref 0.2–1)
BNP SERPL-MCNC: 6421 PG/ML
BUN SERPL-MCNC: 19 MG/DL (ref 6–20)
BUN/CREAT SERPL: 14 (ref 12–20)
CALCIUM SERPL-MCNC: 8.3 MG/DL (ref 8.5–10.1)
CHLORIDE SERPL-SCNC: 108 MMOL/L (ref 97–108)
CK SERPL-CCNC: 66 U/L (ref 26–192)
CO2 SERPL-SCNC: 33 MMOL/L (ref 21–32)
CREAT SERPL-MCNC: 1.37 MG/DL (ref 0.55–1.02)
DIFFERENTIAL METHOD BLD: ABNORMAL
EOSINOPHIL # BLD: 0.3 K/UL (ref 0–0.4)
EOSINOPHIL NFR BLD: 4 % (ref 0–7)
ERYTHROCYTE [DISTWIDTH] IN BLOOD BY AUTOMATED COUNT: 16.4 % (ref 11.5–14.5)
GLOBULIN SER CALC-MCNC: 3.5 G/DL (ref 2–4)
GLUCOSE SERPL-MCNC: 127 MG/DL (ref 65–100)
HCT VFR BLD AUTO: 39.4 % (ref 35–47)
HGB BLD-MCNC: 12.2 G/DL (ref 11.5–16)
IMM GRANULOCYTES # BLD AUTO: 0 K/UL (ref 0–0.04)
IMM GRANULOCYTES NFR BLD AUTO: 0 % (ref 0–0.5)
LYMPHOCYTES # BLD: 1.5 K/UL (ref 0.8–3.5)
LYMPHOCYTES NFR BLD: 19 % (ref 12–49)
MCH RBC QN AUTO: 28 PG (ref 26–34)
MCHC RBC AUTO-ENTMCNC: 31 G/DL (ref 30–36.5)
MCV RBC AUTO: 90.6 FL (ref 80–99)
MONOCYTES # BLD: 0.5 K/UL (ref 0–1)
MONOCYTES NFR BLD: 7 % (ref 5–13)
NEUTS SEG # BLD: 5.3 K/UL (ref 1.8–8)
NEUTS SEG NFR BLD: 70 % (ref 32–75)
NRBC # BLD: 0 K/UL (ref 0–0.01)
NRBC BLD-RTO: 0 PER 100 WBC
PLATELET # BLD AUTO: 200 K/UL (ref 150–400)
PMV BLD AUTO: 11.6 FL (ref 8.9–12.9)
POTASSIUM SERPL-SCNC: 2.9 MMOL/L (ref 3.5–5.1)
PROT SERPL-MCNC: 7 G/DL (ref 6.4–8.2)
RBC # BLD AUTO: 4.35 M/UL (ref 3.8–5.2)
SODIUM SERPL-SCNC: 145 MMOL/L (ref 136–145)
TROPONIN I SERPL-MCNC: 0.05 NG/ML
TROPONIN I SERPL-MCNC: <0.05 NG/ML
WBC # BLD AUTO: 7.7 K/UL (ref 3.6–11)

## 2019-04-27 PROCEDURE — 85025 COMPLETE CBC W/AUTO DIFF WBC: CPT

## 2019-04-27 PROCEDURE — 99285 EMERGENCY DEPT VISIT HI MDM: CPT

## 2019-04-27 PROCEDURE — 82550 ASSAY OF CK (CPK): CPT

## 2019-04-27 PROCEDURE — 36415 COLL VENOUS BLD VENIPUNCTURE: CPT

## 2019-04-27 PROCEDURE — 80053 COMPREHEN METABOLIC PANEL: CPT

## 2019-04-27 PROCEDURE — 74011250637 HC RX REV CODE- 250/637: Performed by: EMERGENCY MEDICINE

## 2019-04-27 PROCEDURE — 93005 ELECTROCARDIOGRAM TRACING: CPT

## 2019-04-27 PROCEDURE — 71045 X-RAY EXAM CHEST 1 VIEW: CPT

## 2019-04-27 PROCEDURE — 83880 ASSAY OF NATRIURETIC PEPTIDE: CPT

## 2019-04-27 PROCEDURE — 84484 ASSAY OF TROPONIN QUANT: CPT

## 2019-04-27 RX ORDER — SODIUM,POTASSIUM PHOSPHATES 280-250MG
2 POWDER IN PACKET (EA) ORAL ONCE
Status: COMPLETED | OUTPATIENT
Start: 2019-04-27 | End: 2019-04-27

## 2019-04-27 RX ORDER — LISINOPRIL 40 MG/1
40 TABLET ORAL DAILY
Status: ON HOLD | COMMUNITY
End: 2020-08-12 | Stop reason: SDUPTHER

## 2019-04-27 RX ORDER — TRAMADOL HYDROCHLORIDE 50 MG/1
50 TABLET ORAL
COMMUNITY
End: 2021-04-21 | Stop reason: ALTCHOICE

## 2019-04-27 RX ORDER — DIPHENHYDRAMINE HCL 25 MG
25 CAPSULE ORAL 2 TIMES DAILY
COMMUNITY
End: 2021-04-21 | Stop reason: ALTCHOICE

## 2019-04-27 RX ORDER — NYSTATIN 100000 U/G
CREAM TOPICAL
COMMUNITY
End: 2021-03-08 | Stop reason: ALTCHOICE

## 2019-04-27 RX ORDER — METOPROLOL TARTRATE 25 MG/1
25 TABLET, FILM COATED ORAL
Status: COMPLETED | OUTPATIENT
Start: 2019-04-27 | End: 2019-04-27

## 2019-04-27 RX ADMIN — POTASSIUM & SODIUM PHOSPHATES POWDER PACK 280-160-250 MG 2 PACKET: 280-160-250 PACK at 12:25

## 2019-04-27 RX ADMIN — METOPROLOL TARTRATE 25 MG: 25 TABLET ORAL at 14:10

## 2019-04-27 NOTE — ED PROVIDER NOTES
EMERGENCY DEPARTMENT HISTORY AND PHYSICAL EXAM 
 
 
Date: 4/27/2019 Patient Name: Jeremias Méndez History of Presenting Illness Chief Complaint Patient presents with  Chest Pain  
  chest pain and back pain that started last night. Pt said she started wheezing last night History Provided By: Patient HPI: Jeremias Méndez, 79 y.o. female with PMHx significant for CAD, CHF, A. fib on Pradaxa, status post pacemaker, diabetes, hypertension, GERD, presents to the ED with cc of chest pain. Patient notes that beginning this morning when she woke up she has had some right-sided chest discomfort that wraps up to her right shoulder and back. States that it has been coming and going. Described as a dull, aching sensation that is worse with movement. Last about 15 to 30 minutes when it comes on. Called and spoke with the office, has been reports they spoke with Dr. Alex Foy who stated that she could take an additional dose of her rate controlling medication and follow-up where she was still feeling poorly could come to the emergency department for evaluation. To me, patient denies any palpitations. She denies any shortness of breath it is worse than baseline. No abdominal pain, nausea, vomiting, diarrhea. States she has not missed any dose of her blood thinner. No recent travel, unilateral calf swelling, recent immobilization. Cards: [place] [facility] PCP: Pedro Pablo Adkins MD 
 
There are no other complaints, changes, or physical findings at this time. Current Outpatient Medications Medication Sig Dispense Refill  lisinopril (PRINIVIL, ZESTRIL) 20 mg tablet Take 20 mg by mouth daily.  diphenhydrAMINE (BENADRYL) 25 mg capsule Take 25 mg by mouth two (2) times daily as needed (Allergies).  nystatin (MYCOSTATIN) topical cream Apply  to affected area two (2) times daily as needed for Skin Irritation (Rash).     
 traMADol (ULTRAM) 50 mg tablet Take 50 mg by mouth daily as needed for Pain (Used to supplement the Lyrica when lyrica doesnt manage the pain on its own).  albuterol (PROVENTIL HFA, VENTOLIN HFA, PROAIR HFA) 90 mcg/actuation inhaler Take 1 Puff by inhalation two (2) times a day. 1 Inhaler 6  potassium chloride SR (KLOR-CON 10) 10 mEq tablet TAKE 1 TABLET EVERY DAY 90 Tab 0  
 dabigatran etexilate (PRADAXA) 150 mg capsule Take 1 Cap by mouth two (2) times a day. IF NO BLEEDING AT CATH SITE. 60 Cap 12  
 bumetanide (BUMEX) 2 mg tablet Take 1 Tab by mouth daily. 90 Tab 3  clonazePAM (KLONOPIN) 0.5 mg tablet TAKE 1 TABLET EVERY NIGHT. MAX DAILY DOSE OF 0.5MG. 90 Tab 1  
 amLODIPine (NORVASC) 2.5 mg tablet Take 1 Tab by mouth daily. 90 Tab 3  
 omeprazole (PRILOSEC) 40 mg capsule Take 1 Cap by mouth daily. 90 Cap 3  
 metoprolol succinate (TOPROL-XL) 100 mg tablet Take 1 Tab by mouth daily. 30 Tab prn  acetaminophen (TYLENOL EXTRA STRENGTH) 500 mg tablet Take 1,000 mg by mouth every eight (8) hours as needed for Pain (Headache).  lidocaine (ASPERCREME, LIDOCAINE,) 4 % topical cream Apply  to affected area two (2) times daily as needed for Pain (Knee pain).  sodium chloride (AYR SALINE) 0.65 % nasal squeeze bottle 1 [place] by Both Nostrils route two (2) times a day.  conjugated estrogens (PREMARIN) 0.625 mg/gram vaginal cream Insert 0.5 g into vagina two (2) days a week. Tuesdays and Thursdays  venlafaxine-SR (EFFEXOR XR) 150 mg capsule Take 150 mg by mouth two (2) times daily (with meals).  pregabalin (LYRICA) 200 mg capsule Take 200 mg by mouth two (2) times a day.  cholecalciferol, vitamin d3, (VITAMIN D3) 400 unit cap Take 400 Units by mouth daily.  aspirin 81 mg chewable tablet Take 81 mg by mouth daily.  atorvastatin (LIPITOR) 40 mg tablet Take 1 Tab by mouth nightly. 30 Tab 12 Past History Past Medical History: 
Past Medical History:  
Diagnosis Date  Anxiety 1/22/2018  Arthritis OA  Asthma  Diabetes ([address]  Essential hypertension  GERD (gastroesophageal reflux disease)  Hypercholesterolemia 1/22/2018  Hypertension  Ill-defined condition   
 diverticulitis  Long-term use of high-risk medication 1/22/2018  Neuropathy  Other ill-defined conditions(799.89) IBS, spinal stenosis  Plantar fasciitis  Psychiatric disorder   
 depression, anxiety  Sleep apnea   
 uses CPAP  Type 2 diabetes mellitus with diabetic neuropathy, without long-term current use of insulin ([address] 6/5/2016 Past Surgical History: 
Past Surgical History:  
Procedure Laterality Date  CARDIAC SURG PROCEDURE UNLIST    
 stent  COLONOSCOPY N/A 3/14/2018 COLONOSCOPY performed by Sabrina Montanez MD at [address] [place] [address] Blvd  HX CHOLECYSTECTOMY  11/15/2018  HX HYSTERECTOMY  HX PACEMAKER Family History: 
Family History Problem Relation [facility] Arthritis-osteo Mother  Hypertension Mother  High Cholesterol Mother  Crohn's Disease Mother  Heart Disease Mother  Alcohol abuse Father  High Cholesterol Sister  Hypertension Sister  Thyroid Disease Sister  COPD Sister  High Cholesterol Brother  Hypertension Brother  COPD Brother  COPD Child  Inflammatory Bowel Dz Child Social History: 
Social History Tobacco Use  Smoking status: Never Smoker  Smokeless tobacco: Never Used Substance Use Topics  Alcohol use: No  
 Drug use: No  
 
Allergies: Allergies Allergen Reactions  Sulfa (Sulfonamide Antibiotics) Swelling  Amoxicillin Swelling Review of Systems Review of Systems Constitutional: Negative for chills and fever. HENT: Negative for congestion, rhinorrhea and sore throat. Respiratory: Negative for cough and shortness of breath. Cardiovascular: Positive for chest pain. Gastrointestinal: Negative for abdominal pain, nausea and vomiting. Genitourinary: Negative for dysuria and urgency. Skin: Negative for rash. Neurological: Negative for dizziness, light-headedness and headaches. All other systems reviewed and are negative. Physical Exam  
Physical Exam  
Constitutional: She is oriented to person, place, and time. She appears well-developed and well-nourished. No distress. HENT:  
Head: Normocephalic and atraumatic. Eyes: Pupils are equal, round, and reactive to light. Conjunctivae and EOM are normal.  
Neck: Normal range of motion. Cardiovascular: Normal rate, regular rhythm and intact distal pulses. Pulmonary/Chest: Effort normal and breath sounds normal. No stridor. No respiratory distress. Abdominal: Soft. She exhibits no distension. There is no tenderness. Musculoskeletal: Normal range of motion. She exhibits edema (trace pitting b/l). Neurological: She is alert and oriented to person, place, and time. Skin: Skin is warm and dry. Psychiatric: She has a normal mood and affect. Nursing note and vitals reviewed. Diagnostic Study Results Labs - Recent Results (from the past 12 hour(s)) EKG, 12 LEAD, INITIAL Collection Time: 04/27/19  9:48 AM  
Result Value Ref Range Ventricular Rate 80 BPM  
 Atrial Rate 78 BPM  
 QRS Duration 150 ms  
 Q-T Interval 466 ms  
 QTC Calculation (Bezet) 537 ms Calculated R Axis -104 degrees Calculated T Axis 52 degrees Diagnosis Suspect unspecified pacemaker failure Ventricular-paced rhythm When compared with ECG of 27-JAN-2019 10:11, No significant change was found CBC WITH AUTOMATED DIFF Collection Time: 04/27/19 10:18 AM  
Result Value Ref Range WBC 7.7 3.6 - 11.0 K/uL  
 RBC 4.35 3.80 - 5.20 M/uL  
 HGB 12.2 11.5 - 16.0 g/dL HCT 39.4 35.0 - 47.0 % MCV 90.6 80.0 - 99.0 FL  
 MCH 28.0 26.0 - 34.0 PG  
 MCHC 31.0 30.0 - 36.5 g/dL  
 RDW 16.4 (H) 11.5 - 14.5 % PLATELET 262 723 - 054 K/uL  MPV 11.6 8.9 - 12.9 FL  
 NRBC 0.0 0  WBC ABSOLUTE NRBC 0.00 0.00 - 0.01 K/uL NEUTROPHILS 70 32 - 75 % LYMPHOCYTES 19 12 - 49 % MONOCYTES 7 5 - 13 % EOSINOPHILS 4 0 - 7 % BASOPHILS 0 0 - 1 % IMMATURE GRANULOCYTES 0 0.0 - 0.5 % ABS. NEUTROPHILS 5.3 1.8 - 8.0 K/UL  
 ABS. LYMPHOCYTES 1.5 0.8 - 3.5 K/UL  
 ABS. MONOCYTES 0.5 0.0 - 1.0 K/UL  
 ABS. EOSINOPHILS 0.3 0.0 - 0.4 K/UL  
 ABS. BASOPHILS 0.0 0.0 - 0.1 K/UL  
 ABS. IMM. GRANS. 0.0 0.00 - 0.04 K/UL  
 DF AUTOMATED METABOLIC PANEL, COMPREHENSIVE Collection Time: 04/27/19 10:18 AM  
Result Value Ref Range Sodium 145 136 - 145 mmol/L Potassium 2.9 (L) 3.5 - 5.1 mmol/L Chloride 108 97 - 108 mmol/L  
 CO2 33 (H) 21 - 32 mmol/L Anion gap 4 (L) 5 - 15 mmol/L Glucose 127 (H) 65 - 100 mg/dL BUN 19 6 - 20 MG/DL Creatinine 1.37 (H) 0.55 - 1.02 MG/DL  
 BUN/Creatinine ratio 14 12 - 20 GFR est AA 47 (L) >60 ml/min/1.73m2 GFR est non-AA 39 (L) >60 ml/min/1.73m2 Calcium 8.3 (L) 8.5 - 10.1 MG/DL Bilirubin, total 1.0 0.2 - 1.0 MG/DL  
 ALT (SGPT) 16 12 - 78 U/L  
 AST (SGOT) 17 15 - 37 U/L Alk. phosphatase 146 (H) 45 - 117 U/L Protein, total 7.0 6.4 - 8.2 g/dL Albumin 3.5 3.5 - 5.0 g/dL Globulin 3.5 2.0 - 4.0 g/dL A-G Ratio 1.0 (L) 1.1 - 2.2 CK W/ REFLX CKMB Collection Time: 04/27/19 10:18 AM  
Result Value Ref Range CK 66 26 - 192 U/L  
TROPONIN I Collection Time: 04/27/19 10:18 AM  
Result Value Ref Range Troponin-I, Qt. 0.05 (H) <0.05 ng/mL NT-PRO BNP Collection Time: 04/27/19 10:18 AM  
Result Value Ref Range NT pro-BNP 6,421 (H) <125 PG/ML  
TROPONIN I Collection Time: 04/27/19  1:22 PM  
Result Value Ref Range Troponin-I, Qt. <0.05 <0.05 ng/mL Radiologic Studies -  
XR CHEST PORT Final Result IMPRESSION:  
No acute finding Xr Chest [facility] Result Date: 4/27/2019 IMPRESSION: No acute finding Medical Decision Making I am the first provider for this patient. I reviewed the vital signs, available nursing notes, past medical history, past surgical history, family history and social history. Vital Signs-Reviewed the patient's vital signs. Patient Vitals for the past 12 hrs: 
 Temp Pulse Resp BP SpO2  
04/27/19 1430  83 14 (!) 156/119 91 % 04/27/19 1340  80 19 (!) 182/124 96 % 04/27/19 1300  81 18 (!) 169/127 96 % 04/27/19 0954 97.5 °F (36.4 °C) 80 18 (!) 177/117 96 % Pulse Oximetry Analysis - 96% on ra Cardiac Monitor:  
Rate: 82 bpm 
Rhythm: Normal Sinus Rhythm ED EKG interpretation: 
Rhythm: paced; and regular . Rate (approx.): 80; Axis: left axis deviation; P wave: normal; QRS interval: prolonged; ST/T wave: depression in V1-2, no change from prior; Other findings: unchanged from previous ekg. This EKG was interpreted by JAXON Herron MD,ED Provider. Records Reviewed: Nursing Notes, Old Medical Records and Previous electrocardiograms Provider Notes (Medical Decision Making):  
Ddx: ACS, CHF, MSK pain, costochondritis Patient presents with complaint of intermittent right-sided chest pain. Does sound atypical in nature for cardiac causes. Less likely PE as patient has been compliant with her Pradaxa and has no shortness of breath and pain is worse with movement. Will work-up for ACS with basic labs, troponin x2, EKG, chest x-ray, BNP 
 
ED Course:  
Initial assessment performed. The patients presenting problems have been discussed, and they are in agreement with the care plan formulated and outlined with them. I have encouraged them to ask questions as they arise throughout their visit. ED Course as of Apr 27 1445 Sat Apr 27, 2019  
1438 Repeat trop negative. Pt has remained CP free. Cr and BNP at baseline. Stable for D/C with outpt follow up with cardiology Monica Winkler ED Course User Index Keaton Purdy MD  
 
 
Critical Care: 
none Disposition: 
Discharge Note: 
2:40PM 
 The patient has been re-evaluated and is ready for discharge. Reviewed available results with patient. Counseled patient on diagnosis and care plan. Patient has expressed understanding, and all questions have been answered. Patient agrees with plan and agrees to follow up as recommended, or to return to the ED if their symptoms worsen. Discharge instructions have been provided and explained to the patient, along with reasons to return to the ED. PLAN: 
1. Discharge Medication List as of 4/27/2019  2:40 PM  
  
 
2. Follow-up Information Follow up With Specialties Details Why Contact Info Connor Hernandez MD Cardiology Schedule an appointment as soon as possible for a visit  [id number] [facility] 
[phone] Aayush Caldwell MD Internal Medicine Schedule an appointment as soon as possible for a visit  [address] [facility] 
[phone] [facility] EMERGENCY DEPT Emergency Medicine  As needed, If symptoms worsen [address] [facility] 
[phone] Return to ED if worse Diagnosis Clinical Impression: 1. Chest pain, unspecified type This note will not be viewable in [address]

## 2019-04-27 NOTE — DISCHARGE INSTRUCTIONS
Patient Education        Chest Pain: Care Instructions  Your Care Instructions    There are many things that can cause chest pain. Some are not serious and will get better on their own in a few days. But some kinds of chest pain need more testing and treatment. Your doctor may have recommended a follow-up visit in the next 8 to 12 hours. If you are not getting better, you may need more tests or treatment. Even though your doctor has released you, you still need to watch for any problems. The doctor carefully checked you, but sometimes problems can develop later. If you have new symptoms or if your symptoms do not get better, get medical care right away. If you have worse or different chest pain or pressure that lasts more than 5 minutes or you passed out (lost consciousness), call 911 or seek other emergency help right away. A medical visit is only one step in your treatment. Even if you feel better, you still need to do what your doctor recommends, such as going to all suggested follow-up appointments and taking medicines exactly as directed. This will help you recover and help prevent future problems. How can you care for yourself at home? · Rest until you feel better. · Take your medicine exactly as prescribed. Call your doctor if you think you are having a problem with your medicine. · Do not drive after taking a prescription pain medicine. When should you call for help? Call 911 if:    · You passed out (lost consciousness).     · You have severe difficulty breathing.     · You have symptoms of a heart attack. These may include:  ? Chest pain or pressure, or a strange feeling in your chest.  ? Sweating. ? Shortness of breath. ? Nausea or vomiting. ? Pain, pressure, or a strange feeling in your back, neck, jaw, or upper belly or in one or both shoulders or arms. ? Lightheadedness or sudden weakness. ? A fast or irregular heartbeat.   After you call 911, the  may tell you to chew 1 adult-strength or 2 to 4 low-dose aspirin. Wait for an ambulance. Do not try to drive yourself.    Call your doctor today if:    · You have any trouble breathing.     · Your chest pain gets worse.     · You are dizzy or lightheaded, or you feel like you may faint.     · You are not getting better as expected.     · You are having new or different chest pain. Where can you learn more? Go to http://dawood-vera.info/. Enter A120 in the search box to learn more about \"Chest Pain: Care Instructions. \"  Current as of: September 23, 2018  Content Version: 11.9  © 8560-6331 [employer]. Care instructions adapted under license by [facility] (which disclaims liability or warranty for this information). If you have questions about a medical condition or this instruction, always ask your healthcare professional. [address] any warranty or liability for your use of this information.

## 2019-04-27 NOTE — PROGRESS NOTES
Pharmacy Clarification of Prior to Admission Medication Regimen The patient was interviewed regarding clarification of the prior to admission medication regimen. The patient's , Gilford Hough, was present in room and obtained permission from patient to discuss drug regimen with visitor(s) present. The patient was questioned regarding use of any other inhalers, topical products, over the counter medications, herbal medications, vitamin products or ophthalmic/nasal/otic medication use. Information Obtained From: RX Query, Patient, Med List 
 
Pertinent Pharmacy Findings: 
Identified High Alert Medication Information Current Anticoagulants: 
Name: dabigatran etexilate (PRADAXA) 150 mg capsule PTA medication list was corrected to the following:  
 
Prior to Admission Medications Prescriptions Last Dose Informant Patient Reported? Taking?  
acetaminophen (TYLENOL EXTRA STRENGTH) 500 mg tablet 4/25/2019 at Unknown time Self Yes Yes Sig: Take 1,000 mg by mouth every eight (8) hours as needed for Pain (Headache). albuterol (PROVENTIL HFA, VENTOLIN HFA, PROAIR HFA) 90 mcg/actuation inhaler 4/27/2019 at Unknown time Self No Yes Sig: Take 1 Puff by inhalation two (2) times a day. amLODIPine (NORVASC) 2.5 mg tablet 4/27/2019 at Unknown time Self No Yes Sig: Take 1 Tab by mouth daily. aspirin 81 mg chewable tablet 4/27/2019 at Unknown time Self Yes Yes Sig: Take 81 mg by mouth daily. atorvastatin (LIPITOR) 40 mg tablet 4/26/2019 at Unknown time Self No Yes Sig: Take 1 Tab by mouth nightly. bumetanide (BUMEX) 2 mg tablet 4/27/2019 at Unknown time Self No Yes Sig: Take 1 Tab by mouth daily. cholecalciferol, vitamin d3, (VITAMIN D3) 400 unit cap 4/27/2019 at Unknown time Self Yes Yes Sig: Take 400 Units by mouth daily. clonazePAM (KLONOPIN) 0.5 mg tablet 4/26/2019 at Unknown time Self No Yes Sig: TAKE 1 TABLET EVERY NIGHT. MAX DAILY DOSE OF 0.5MG. conjugated estrogens (PREMARIN) 0.625 mg/gram vaginal cream 2019 at Unknown time Self Yes Yes Sig: Insert 0.5 g into vagina two (2) days a week.  and   
dabigatran etexilate (PRADAXA) 150 mg capsule 2019 at Unknown time Self No Yes Sig: Take 1 Cap by mouth two (2) times a day. IF NO BLEEDING AT CATH SITE. diphenhydrAMINE (BENADRYL) 25 mg capsule 2019 at Unknown time Self Yes Yes Sig: Take 25 mg by mouth two (2) times daily as needed (Allergies). lidocaine (ASPERCREME, LIDOCAINE,) 4 % topical cream 3/27/2019 at Unknown time Self Yes Yes Sig: Apply  to affected area two (2) times daily as needed for Pain (Knee pain). lisinopril (PRINIVIL, ZESTRIL) 20 mg tablet 2019 at Unknown time Self Yes Yes Sig: Take 20 mg by mouth daily. metoprolol succinate (TOPROL-XL) 100 mg tablet 2019 at Unknown time Self No Yes Sig: Take 1 Tab by mouth daily. nystatin (MYCOSTATIN) topical cream 2019 at Unknown time Self Yes Yes Sig: Apply  to affected area two (2) times daily as needed for Skin Irritation (Rash). omeprazole (PRILOSEC) 40 mg capsule 2019 at Unknown time Self No Yes Sig: Take 1 Cap by mouth daily. potassium chloride SR (KLOR-CON 10) 10 mEq tablet 2019 at Unknown time Self No Yes Sig: TAKE 1 TABLET EVERY DAY  
pregabalin (LYRICA) 200 mg capsule 2019 at Unknown time Self Yes Yes Sig: Take 200 mg by mouth two (2) times a day. sodium chloride (AYR SALINE) 0.65 % nasal squeeze bottle 2019 at Unknown time Self Yes Yes [place] by Both Nostrils route two (2) times a day. traMADol (ULTRAM) 50 mg tablet 3/27/2019 at Unknown time Self Yes Yes Sig: Take 50 mg by mouth daily as needed for Pain (Used to supplement the Lyrica when lyrica doesnt manage the pain on its own). venlafaxine-SR (EFFEXOR XR) 150 mg capsule 2019 at Unknown time Self Yes Yes Sig: Take 150 mg by mouth two (2) times daily (with meals). Facility-Administered Medications: None Thank you, Jennifer Rodriguez CPhT Medication History Pharmacy Technician

## 2019-04-28 LAB
ATRIAL RATE: 78 BPM
CALCULATED R AXIS, ECG10: -104 DEGREES
CALCULATED T AXIS, ECG11: 52 DEGREES
DIAGNOSIS, 93000: NORMAL
Q-T INTERVAL, ECG07: 466 MS
QRS DURATION, ECG06: 150 MS
QTC CALCULATION (BEZET), ECG08: 537 MS
VENTRICULAR RATE, ECG03: 80 BPM

## 2019-04-30 ENCOUNTER — OFFICE VISIT (OUTPATIENT)
Dept: INTERNAL MEDICINE CLINIC | Age: 69
End: 2019-04-30

## 2019-04-30 VITALS
WEIGHT: 244 LBS | HEART RATE: 81 BPM | DIASTOLIC BLOOD PRESSURE: 90 MMHG | RESPIRATION RATE: 22 BRPM | TEMPERATURE: 97.8 F | HEIGHT: 69 IN | BODY MASS INDEX: 36.14 KG/M2 | OXYGEN SATURATION: 90 % | SYSTOLIC BLOOD PRESSURE: 134 MMHG

## 2019-04-30 DIAGNOSIS — F41.9 ANXIETY: Chronic | ICD-10-CM

## 2019-04-30 DIAGNOSIS — Z79.899 LONG-TERM USE OF HIGH-RISK MEDICATION: Chronic | ICD-10-CM

## 2019-04-30 DIAGNOSIS — Z11.59 NEED FOR HEPATITIS C SCREENING TEST: ICD-10-CM

## 2019-04-30 DIAGNOSIS — I25.5 ISCHEMIC CARDIOMYOPATHY: Chronic | ICD-10-CM

## 2019-04-30 DIAGNOSIS — E66.9 OBESITY (BMI 30-39.9): Chronic | ICD-10-CM

## 2019-04-30 DIAGNOSIS — Z78.0 POSTMENOPAUSAL: ICD-10-CM

## 2019-04-30 DIAGNOSIS — I10 ESSENTIAL HYPERTENSION: Chronic | ICD-10-CM

## 2019-04-30 DIAGNOSIS — I48.20 CHRONIC ATRIAL FIBRILLATION (HCC): Chronic | ICD-10-CM

## 2019-04-30 DIAGNOSIS — Z00.00 INITIAL MEDICARE ANNUAL WELLNESS VISIT: Primary | ICD-10-CM

## 2019-04-30 DIAGNOSIS — E78.00 HYPERCHOLESTEROLEMIA: Chronic | ICD-10-CM

## 2019-04-30 DIAGNOSIS — E11.40 TYPE 2 DIABETES MELLITUS WITH DIABETIC NEUROPATHY, WITHOUT LONG-TERM CURRENT USE OF INSULIN (HCC): Chronic | ICD-10-CM

## 2019-04-30 DIAGNOSIS — I50.22 SYSTOLIC CHF, CHRONIC (HCC): ICD-10-CM

## 2019-04-30 PROBLEM — I48.91 ATRIAL FIBRILLATION WITH RAPID VENTRICULAR RESPONSE (HCC): Status: RESOLVED | Noted: 2018-10-07 | Resolved: 2019-04-30

## 2019-04-30 PROBLEM — N17.9 AKI (ACUTE KIDNEY INJURY) (HCC): Status: RESOLVED | Noted: 2018-10-13 | Resolved: 2019-04-30

## 2019-04-30 PROBLEM — J96.01 ACUTE RESPIRATORY FAILURE WITH HYPOXEMIA (HCC): Status: RESOLVED | Noted: 2018-10-06 | Resolved: 2019-04-30

## 2019-04-30 PROBLEM — I50.23 ACUTE ON CHRONIC SYSTOLIC CHF (CONGESTIVE HEART FAILURE) (HCC): Status: RESOLVED | Noted: 2018-11-12 | Resolved: 2019-04-30

## 2019-04-30 LAB
A-G RATIO,AGRAT: 1.5 RATIO
ALBUMIN SERPL-MCNC: 4.1 G/DL (ref 3.9–5.4)
ALP SERPL-CCNC: 128 U/L (ref 38–126)
ALT SERPL-CCNC: 17 U/L (ref 9–52)
ANION GAP SERPL CALC-SCNC: 12 MMOL/L
AST SERPL W P-5'-P-CCNC: 20 U/L (ref 14–36)
BILIRUB SERPL-MCNC: 1 MG/DL (ref 0.2–1.3)
BILIRUB UR QL: NEGATIVE
BUN SERPL-MCNC: 24 MG/DL (ref 7–17)
BUN/CREATININE RATIO,BUCR: 17 RATIO
CALCIUM SERPL-MCNC: 9.4 MG/DL (ref 8.4–10.2)
CHLORIDE SERPL-SCNC: 105 MMOL/L (ref 98–107)
CHOL/HDL RATIO,CHHD: 3 RATIO (ref 0–4)
CHOLEST SERPL-MCNC: 106 MG/DL (ref 0–200)
CK SERPL-CCNC: 47 U/L (ref 30–135)
CLARITY: CLEAR
CO2 SERPL-SCNC: 28 MMOL/L (ref 22–32)
COLOR UR: ABNORMAL
CREAT SERPL-MCNC: 1.4 MG/DL (ref 0.7–1.2)
GLOBULIN,GLOB: 2.7
GLUCOSE 24H UR-MRATE: NEGATIVE G/(24.H)
GLUCOSE SERPL-MCNC: 118 MG/DL (ref 65–105)
HDLC SERPL-MCNC: 35 MG/DL (ref 35–130)
HGB UR QL STRIP: NEGATIVE
KETONES UR QL STRIP.AUTO: NEGATIVE
LDL/HDL RATIO,LDHD: 1 RATIO
LDLC SERPL CALC-MCNC: 39 MG/DL (ref 0–130)
LEUKOCYTE ESTERASE: ABNORMAL
MICROALBUMIN, URINE: 50 MG/L (ref 0–20)
NITRITE UR QL STRIP.AUTO: NEGATIVE
PH UR STRIP: 7 [PH] (ref 5–7)
POTASSIUM SERPL-SCNC: 3.6 MMOL/L (ref 3.6–5)
PROT SERPL-MCNC: 6.8 G/DL (ref 6.3–8.2)
PROT UR STRIP-MCNC: NEGATIVE MG/DL
RBC #/AREA URNS HPF: ABNORMAL #/HPF
SODIUM SERPL-SCNC: 145 MMOL/L (ref 137–145)
SP GR UR REFRACTOMETRY: 1.01 (ref 1–1.03)
SQUAMOUS EPITHELIAL CELLS: ABNORMAL
TRIGL SERPL-MCNC: 161 MG/DL (ref 0–200)
TSH SERPL DL<=0.05 MIU/L-ACNC: 2.04 UIU/ML (ref 0.34–5.6)
UROBILINOGEN UR QL STRIP.AUTO: NEGATIVE
VLDLC SERPL CALC-MCNC: 32 MG/DL
WBC URNS QL MICRO: ABNORMAL #/HPF

## 2019-04-30 NOTE — PATIENT INSTRUCTIONS
The best way to stay healthy is to live a healthy lifestyle. A healthy lifestyle includes regular exercise, eating a well-balanced diet, keeping a healthy weight and not smoking. Regular physical exams and screening tests are another important way to take care of yourself. Preventive exams provided by health care providers can find health problems early when treatment works best and can keep you from getting certain diseases or illnesses. Preventive services include exams, lab tests, screenings, shots, monitoring and information to help you take care of your own health. All people over 65 should have a pneumonia shot. Pneumonia shots are usually only needed once in a lifetime unless your doctor decides differently. In addition to your physical exam, some screening tests are recommended: 
 
All people over 65 should have a yearly flu shot. People over 65 are at medium to high risk for Hepatitis B. Three shots are needed for complete protection. Bone mass measurement (dexa scan) is recommended every two years. Diabetes Mellitus screening is recommended every year. Glaucoma is an eye disease caused by high pressure in the eye. An eye exam is recommended every year. Cardiovascular screening tests that check your cholesterol and other blood fat (lipid) levels are recommended every five years. Colorectal Cancer screening tests help to find pre-cancerous polyps (growths in the colon) so they can be removed before they turn into cancer. Tests ordered for screening depend on your personal and family history risk factors. Prostate Cancer Screening (annually up to age 76) Screening for breast cancer is recommended yearly with a Mammogram. 
 
Screening for cervical and vaginal cancer is recommended with a pelvic and Pap test every two years.  However if you have had an abnormal pap in the past  three years or at high risk for cervical or vaginal cancer Medicare will cover a pap test and a pelvic exam every year. Here is a list of your current Health Maintenance items with a due date: 
Health Maintenance Due Topic Date Due  
 Hepatitis C Test  1950  DTaP/Tdap/Td  (1 - Tdap) 09/13/1973  Shingles Vaccine (1 of 2) 09/13/2002  Mammogram  09/13/2002  Bone Mineral Density   09/13/2017  Pneumococcal Vaccine (1 of 2 - PCV13) 09/13/2017 [facility] Annual Well Visit  03/14/2018  Hemoglobin A1C    02/01/2019 Learning About Cutting Calories How do calories affect your weight? Food gives your body energy. Energy from the food you eat is measured in calories. This energy keeps your heart beating, your brain active, and your muscles working. Your body needs a certain number of calories each day. After your body uses the calories it needs, it stores extra calories as fat. To lose weight safely, you have to eat fewer calories while eating in a healthy way. How many calories do you need each day? The more active you are, the more calories you need. When you are less active, you need fewer calories. How many calories you need each day also depends on several things, including your age and whether you are male or female. Here are some general guidelines for adults: 
· Less active women and older adults need 1,600 to 2,000 calories each day. · Active women and less active men need 2,000 to 2,400 calories each day. · Active men need 2,400 to 3,000 calories each day. How can you cut calories and eat healthy meals? Whole grains, vegetables and fruits, and dried beans are good lower-calorie foods. They give you lots of nutrients and fiber. And they fill you up. Sweets, energy drinks, and soda pop are high in calories. They give you few nutrients and no fiber. Try to limit soda pop, fruit juice, and energy drinks. Drink water instead. Some fats can be part of a healthy diet.  But cutting back on fats from highly processed foods like fast foods and many snack foods is a good way to lower the calories in your diet. Also, use smaller amounts of fats like butter, margarine, salad dressing, and mayonnaise. Add fresh garlic, lemon, or herbs to your meals to add flavor without adding fat. Meats and dairy products can be a big source of hidden fats. Try to choose lean or low-fat versions of these products. Fat-free cookies, candies, chips, and frozen treats can still be high in sugar and calories. Some fat-free foods have more calories than regular ones. Eat fat-free treats in moderation, as you would other foods. If your favorite foods are high in fat, salt, sugar, or calories, limit how often you eat them. Eat smaller servings, or look for healthy substitutes. Fill up on fruits, vegetables, and whole grains. Eating at home · Use meat as a side dish instead of as the main part of your meal. 
· Try main dishes that use whole wheat pasta, brown rice, dried beans, or vegetables. · Find ways to cook with little or no fat, such as broiling, steaming, or grilling. · Use cooking spray instead of oil. If you use oil, use a monounsaturated oil, such as canola or olive oil. · Trim fat from meats before you cook them. · Drain off fat after you brown the meat or while you roast it. · Chill soups and stews after you cook them. Then skim the fat off the top after it hardens. Eating out · Order foods that are broiled or poached rather than fried or breaded. · Cut back on the amount of butter or margarine that you use on bread. · Order sauces, gravies, and salad dressings on the side, and use only a little. · When you order pasta, choose tomato sauce rather than cream sauce. · Ask for salsa with your baked potato instead of sour cream, butter, cheese, or beltran. · Order meals in a small size instead of upgrading to a large.  
· Share an entree, or take part of your food home to eat as another meal. 
 · Share appetizers and desserts. Where can you learn more? Go to http://dawood-vera.info/. Enter 99 [id number] in the search box to learn more about \"Learning About Cutting Calories. \" Current as of: March 28, 2018 Content Version: 11.9 © 1578-0814 [employer]. Care instructions adapted under license by [employer] (which disclaims liability or warranty for this information). If you have questions about a medical condition or this instruction, always ask your healthcare professional. [address] any warranty or liability for your use of this information.

## 2019-04-30 NOTE — PROGRESS NOTES
Chief Complaint Patient presents with [facility] Annual Wellness Visit  Hypertension Depression Risk Factor Screening:  
 
3 most recent PHQ Screens 1/31/2019 Little interest or pleasure in doing things Not at all Feeling down, depressed, irritable, or hopeless Not at all Total Score PHQ 2 0 Trouble falling or staying asleep, or sleeping too much Not at all Feeling tired or having little energy More than half the days Poor appetite, weight loss, or overeating Not at all Feeling bad about yourself - or that you are a failure or have let yourself or your family down Not at all Trouble concentrating on things such as school, work, reading, or watching TV Not at all Moving or speaking so slowly that other people could have noticed; or the opposite being so fidgety that others notice Not at all Thoughts of being better off dead, or hurting yourself in some way Not at all PHQ 9 Score 2 How difficult have these problems made it for you to do your work, take care of your home and get along with others Not difficult at all Functional Ability and Level of Safety: Activities of Daily Living ADL Assessment 4/30/2019 Feeding yourself No Help Needed Getting from bed to chair No Help Needed Getting dressed No Help Needed Bathing or showering No Help Needed Walk across the room (includes cane/walker) No Help Needed Using the telphone No Help Needed Taking your medications No Help Needed Preparing meals No Help Needed Managing money (expenses/bills) No Help Needed Moderately strenuous housework (laundry) No Help Needed Shopping for personal items (toiletries/medicines) No Help Needed Shopping for groceries No Help Needed Driving Help Needed Climbing a flight of stairs No Help Needed Getting to places beyond walking distances No Help Needed Fall Risk Fall Risk Assessment, last 12 mths 1/31/2019 Able to walk? Yes Fall in past 12 months?  Yes  
 Fall with injury? No  
Number of falls in past 12 months 1 Fall Risk Score 1 Abuse Screen Abuse Screening Questionnaire 1/31/2019 Do you ever feel afraid of your partner? Pratt Poles Are you in a relationship with someone who physically or mentally threatens you? Pratt Poles Is it safe for you to go home? Jessy Sheth Patient Care Team  
Patient Care Team: 
Talita Bar MD as PCP - General (Internal Medicine) Derrek Robledo MD (Nephrology) Jv Cain MD (Cardiology) Johnny Edmonds MD (Female Pelvic Medicine and Reconstructive Surgery)

## 2019-04-30 NOTE — PROGRESS NOTES
This note will not be viewable in [address] Norman Hurley is a 79 y.o. female who presents for an Initial Medicare Annual Wellness Exam (AWV) and follow up of chronic medical conditions. The patient has not had a Medicare wellness exam done within the last year I have reviewed the patient's medical history in detail and updated the computerized patient record. History Subjective: 
Mrs. Idalia Velasco presents to the office today for Medicare wellness check and follow-up of multiple medical problems. The patient has type 2 diabetes mellitus complicated by peripheral neuropathy. She currently is on no medication for this. She states that she checks her blood sugars once a day with average fasting blood sugar running between 101 55. She has had an eye exam done within the last year. She is followed by neurology for her peripheral neuropathy and is noted no worsening of her paresthesias. She does take Lyrica for this. She has had a diabetic eye exam done within the last year will have a copy of this report sent to us. She has hypertension and her lisinopril was recently increased from 20 to 40 mg a day. In addition she takes Bumex along with a potassium supplement and amlodipine and Toprol-XL. The patient denies any fatigue, palpitations, cough, orthostatic dizziness or muscle cramping. She has had no headaches, numbness, tingling or focal neurological problems. Hypercholesterolemia is currently managed on statin therapy. She denies muscle soreness or GI upset. She does have a known underlying heart disease but has had no exertional chest pains or claudication. She does have an ischemic cardiomyopathy along with atrial fibrillation for which she has had a pacemaker placed. She does have chronic systolic congestive heart failure but denies any exacerbation of lower extremity edema and has had no PND or orthopnea.   She does go in and out of atrial fibrillation and does take Pradaxa for stroke prevention. She does have anxiety and depression and remains compliant with her Effexor and her clonazepam.  These medications continue to work effectively for her and managing her symptoms. Patient Active Problem List  
Diagnosis Code  Unspecified hereditary and idiopathic peripheral neuropathy G60.9  
 HBP (high blood pressure) I10  Spinal stenosis, lumbar region, without neurogenic claudication M48.061  
 Essential and other specified forms of tremor G25.0, G25.2  IBS (irritable bowel syndrome) K58.9  Depression F32.9  Migraine with aura, without mention of intractable migraine without mention of status migrainosus G43. 80  Right knee DJD M17.11  
 B12 deficiency E53.8  Ataxia R27.0  Foot pain, right M79.671  Fever R50.9  UTI (urinary tract infection) N39.0  Lower abdominal pain R10.30  Type 2 diabetes mellitus with diabetic neuropathy, without long-term current use of insulin (HCC) E11.40  Chronic atrial fibrillation (HCC) I48.2  Systolic CHF, chronic ([facility]) I50.22  
 Cellulitis of left lower leg L03. 116  
 Anxiety F41.9  Hypercholesterolemia E78.00  Long-term use of high-risk medication Z79.899  Hypokalemia E87.6  Ischemic cardiomyopathy I25.5  CAD (coronary artery disease) I25.10  Epigastric abdominal pain R10.13  
 S/P placement of cardiac pacemaker Z95.0  Stage 3 chronic kidney disease (HCC) N18.3  Chronic cholecystitis K81.1  Asthma J45.909  Obesity (BMI 30-39. 9) E66.9 Patient Care Team: 
Remberto Bowman MD as PCP - General (Internal Medicine) Nash Winn MD (Nephrology) Ena Woodard MD (Cardiology) Tejas Gómez MD (Female Pelvic Medicine and Reconstructive Surgery) Past Medical History:  
Diagnosis Date  Anxiety 1/22/2018  Arthritis OA  Asthma  Diabetes ([facility] [address]  Essential hypertension  GERD (gastroesophageal reflux disease)  Hypercholesterolemia 1/22/2018  Hypertension  Ill-defined condition   
 diverticulitis  Long-term use of high-risk medication 1/22/2018  Neuropathy  Obesity (BMI 30-39.9) 4/30/2019  Other ill-defined conditions(799.89) IBS, spinal stenosis  Plantar fasciitis  Psychiatric disorder   
 depression, anxiety  Sleep apnea   
 uses CPAP  Type 2 diabetes mellitus with diabetic neuropathy, without long-term current use of insulin ([address] 6/5/2016 Past Surgical History:  
Procedure Laterality Date  CARDIAC SURG PROCEDURE UNLIST    
 stent  COLONOSCOPY N/A 3/14/2018 COLONOSCOPY performed by Keturah Wall MD at [address] [place]  HX CHOLECYSTECTOMY  11/15/2018  HX HYSTERECTOMY  HX PACEMAKER Allergies Allergen Reactions  Sulfa (Sulfonamide Antibiotics) Swelling  Amoxicillin Swelling Current Outpatient Medications Medication Sig Dispense Refill  lisinopril (PRINIVIL, ZESTRIL) 40 mg tablet Take 40 mg by mouth daily.  diphenhydrAMINE (BENADRYL) 25 mg capsule Take 25 mg by mouth two (2) times daily as needed (Allergies).  nystatin (MYCOSTATIN) topical cream Apply  to affected area two (2) times daily as needed for Skin Irritation (Rash).  traMADol (ULTRAM) 50 mg tablet Take 50 mg by mouth daily as needed for Pain (Used to supplement the Lyrica when lyrica doesnt manage the pain on its own).  albuterol (PROVENTIL HFA, VENTOLIN HFA, PROAIR HFA) 90 mcg/actuation inhaler Take 1 Puff by inhalation two (2) times a day. 1 Inhaler 6  potassium chloride SR (KLOR-CON 10) 10 mEq tablet TAKE 1 TABLET EVERY DAY 90 Tab 0  
 dabigatran etexilate (PRADAXA) 150 mg capsule Take 1 Cap by mouth two (2) times a day. IF NO BLEEDING AT CATH SITE. 60 Cap 12  
 bumetanide (BUMEX) 2 mg tablet Take 1 Tab by mouth daily. 90 Tab 3  clonazePAM (KLONOPIN) 0.5 mg tablet TAKE 1 TABLET EVERY NIGHT. MAX DAILY DOSE OF 0.5MG.  90 Tab 1  
  amLODIPine (NORVASC) 2.5 mg tablet Take 1 Tab by mouth daily. 90 Tab 3  
 omeprazole (PRILOSEC) 40 mg capsule Take 1 Cap by mouth daily. 90 Cap 3  
 metoprolol succinate (TOPROL-XL) 100 mg tablet Take 1 Tab by mouth daily. 30 Tab prn  acetaminophen (TYLENOL EXTRA STRENGTH) 500 mg tablet Take 1,000 mg by mouth every eight (8) hours as needed for Pain (Headache).  lidocaine (ASPERCREME, LIDOCAINE,) 4 % topical cream Apply  to affected area two (2) times daily as needed for Pain (Knee pain).  sodium chloride (AYR SALINE) 0.65 % nasal squeeze bottle 1 [place] by Both Nostrils route two (2) times a day.  conjugated estrogens (PREMARIN) 0.625 mg/gram vaginal cream Insert 0.5 g into vagina two (2) days a week. Tuesdays and Thursdays  venlafaxine-SR (EFFEXOR XR) 150 mg capsule Take 150 mg by mouth two (2) times daily (with meals).  pregabalin (LYRICA) 200 mg capsule Take 200 mg by mouth two (2) times a day.  cholecalciferol, vitamin d3, (VITAMIN D3) 400 unit cap Take 400 Units by mouth daily.  aspirin 81 mg chewable tablet Take 81 mg by mouth daily.  atorvastatin (LIPITOR) 40 mg tablet Take 1 Tab by mouth nightly. 30 Tab 12 Social History Socioeconomic History  Marital status:  Spouse name: Not on file  Number of children: Not on file  Years of education: Not on file  Highest education level: Not on file Tobacco Use  Smoking status: Never Smoker  Smokeless tobacco: Never Used Substance and Sexual Activity  Alcohol use: No  
 Drug use: No  
 
Family History Problem Relation Age of Onset [facility] Arthritis-osteo Mother  Hypertension Mother  High Cholesterol Mother  Crohn's Disease Mother  Heart Disease Mother  Alcohol abuse Father  High Cholesterol Sister  Hypertension Sister  Thyroid Disease Sister  COPD Sister  High Cholesterol Brother  Hypertension Brother  COPD Brother  COPD Child  Inflammatory Bowel Dz Child Health Maintenance Topic Date Due  
 Hepatitis C Screening  1950  DTaP/Tdap/Td series (1 - Tdap) 09/13/1973  Shingrix Vaccine Age 50> (1 of 2) 09/13/2002  BREAST CANCER SCRN MAMMOGRAM  09/13/2002  Bone Densitometry (Dexa) Screening  09/13/2017  Pneumococcal 65+ years (1 of 2 - PCV13) 09/13/2017  MEDICARE YEARLY EXAM  03/14/2018  HEMOGLOBIN A1C Q6M  02/01/2019  
 FOOT EXAM Q1  08/01/2019  MICROALBUMIN Q1  08/01/2019  LIPID PANEL Q1  08/01/2019  Influenza Age 5 to Adult  08/01/2019  GLAUCOMA SCREENING Q2Y  04/18/2020  
 EYE EXAM RETINAL OR DILATED  04/18/2020  COLONOSCOPY  03/14/2028 Review of Systems Constitutional: negative for fevers, chills, anorexia and weight loss Eyes:   negative for visual disturbance and irritation ENT:   negative for tinnitus,sore throat,nasal congestion,ear pain,hoarseness Respiratory:  negative for cough, hemoptysis, dyspnea,wheezing CV:   negative for chest pain, palpitations, lower extremity edema GI:   negative for nausea, vomiting, diarrhea, abdominal pain,melena Endo:               negative for polyuria,polydipsia,polyphagia,heat intolerance Genitourinary: negative for frequency, dysuria and hematuria Integumentary: negative for rash and pruritus Hematologic:  negative for easy bruising and gum/nose bleeding Musculoskel: negative for myalgias, arthralgias, back pain, muscle weakness, joint pain Neurological:  negative for headaches, dizziness, vertigo, memory problems and gait Behavl/Psych: negative for feelings of anxiety, depression, mood changes ROS otherwise negative Depression Risk Factor Screening:  
 
3 most recent PHQ Screens 1/31/2019 Little interest or pleasure in doing things Not at all Feeling down, depressed, irritable, or hopeless Not at all Total Score PHQ 2 0 Trouble falling or staying asleep, or sleeping too much Not at all Feeling tired or having little energy More than half the days Poor appetite, weight loss, or overeating Not at all Feeling bad about yourself - or that you are a failure or have let yourself or your family down Not at all Trouble concentrating on things such as school, work, reading, or watching TV Not at all Moving or speaking so slowly that other people could have noticed; or the opposite being so fidgety that others notice Not at all Thoughts of being better off dead, or hurting yourself in some way Not at all PHQ 9 Score 2 How difficult have these problems made it for you to do your work, take care of your home and get along with others Not difficult at all Alcohol Risk Factor Screening: You do not drink alcohol or very rarely. Functional Ability and Level of Safety:  
Hearing Loss Hearing is good. Activities of Daily Living ADL Assessment 4/30/2019 Feeding yourself No Help Needed Getting from bed to chair No Help Needed Getting dressed No Help Needed Bathing or showering No Help Needed Walk across the room (includes cane/walker) No Help Needed Using the telphone No Help Needed Taking your medications No Help Needed Preparing meals No Help Needed Managing money (expenses/bills) No Help Needed Moderately strenuous housework (laundry) No Help Needed Shopping for personal items (toiletries/medicines) No Help Needed Shopping for groceries No Help Needed Driving Help Needed Climbing a flight of stairs No Help Needed Getting to places beyond walking distances No Help Needed Fall Risk Fall Risk Assessment, last 12 mths 1/31/2019 Able to walk? Yes Fall in past 12 months? Yes Fall with injury? No  
Number of falls in past 12 months 1 Fall Risk Score 1 Abuse Screen Abuse Screening Questionnaire 1/31/2019 Do you ever feel afraid of your partner? Megha Yoon Are you in a relationship with someone who physically or mentally threatens you?  Megha Yoon  
 Is it safe for you to go home? Shawnee Russell Cognitive Screening Evaluation of Cognitive Function: 
Has your family/caregiver stated any concerns about your memory: no 
 
Physical Exam  
Visit Vitals /90 Pulse 81 Temp 97.8 °F (36.6 °C) (Oral) Resp 22 Ht 5' 9\" (1.753 m) Wt 244 lb (110.7 kg) SpO2 90% BMI 36.03 kg/m² Body mass index is 36.03 kg/m². General appearance - alert, well appearing, and in no distress Mental status - alert, oriented to person, place, and time EYE-VIVEK, EOMI,conjunctiva normal bilaterally, lids normal 
ENT-ENT exam normal, no neck nodes or sinus tenderness Nose - normal and patent, no erythema,  Or discharge Mouth - mucous membranes moist, pharynx normal without lesions Neck - supple, no significant adenopathy or bruit Chest - clear to auscultation, no wheezes, rales or rhonchi. Heart - normal rate, regular rhythm, normal S1, S2, no murmurs, rubs, clicks or gallops Abdomen - soft, nontender, nondistended, no masses or organomegaly Lymph- no adenopathy palpable Ext-peripheral pulses normal, no pedal edema, no clubbing or cyanosis Skin-Warm and dry. no hyperpigmentation, vitiligo, or suspicious lesions Neuro -alert, oriented, normal speech, no focal findings or movement disorder noted Assessment/Plan  
[place] education and counseling provided: 
Age appropriate evidence-based preventive care recommendations based on today's review and evaluation; including relevant cancer screening guidelines, and vaccination recommendations. An After Visit Summary was printed and given to the patient which information about these guidelines, and a personalized schedule for health maintenance items. Whe appropriate and with patient agreement, orders noted below were placed to complete missing health maintenance items. Additional Plan for follow up chronic medical conditions includes: 
Diagnoses and all orders for this visit: 
 
Initial Medicare annual wellness visit Type 2 diabetes mellitus with diabetic neuropathy, without long-term current use of insulin (HCC) 
-     HEMOGLOBIN A1C W/O EAG 
-     URINE, MICROALBUMIN, SEMIQUANTITATIVE Essential hypertension 
-     COLLECTION VENOUS BLOOD,VENIPUNCTURE 
-     METABOLIC PANEL, COMPREHENSIVE 
-     URINALYSIS W/MICROSCOPIC Hypercholesterolemia -     LIPID PANEL 
-     TSH 3RD GENERATION Long-term use of high-risk medication 
-     CK Chronic atrial fibrillation (HCC) Anxiety Obesity (BMI 30-39. 9) Systolic CHF, chronic ([address] Ischemic cardiomyopathy Postmenopausal 
-     DEXA BONE DENSITY STUDY AXIAL; Future Need for hepatitis C screening test 
-     HEPATITIS C AB Other instructions:  
Patient's medications were reviewed and reconciled. No change in her current medical regimen is made. Blood pressure is borderline but she does have cardiac follow-up in 2 days. Body mass index is 36 and dietary counseling along with printed patient education is given Continue to check blood sugars once daily Health maintenance issues were reviewed. Hepatitis C screening is ordered. Mammogram is up-to-date and she will have a copy of this report sent to us patient is in need of bone density testing and this will be ordered. Age-appropriate vaccinations were reviewed and we have recommended pneumococcal vaccinations, Tdap and shingles vaccine which she will get at her pharmacy. I recommended she start the Pneumovax with her flu shot this fall. Await results of multiple labs Continued cardiology follow-up Follow-up here in 6 months time Follow-up and Dispositions · Return in about 6 months (around 10/30/2019). I have reviewed with the patient details of the assessment and plan and all questions were answered. Relevent patient education was performed. The most recent lab findings were reviewed with the patient.  
 
Darleen Bowman MD

## 2019-05-01 LAB
HBA1C MFR BLD: 6.1 % (ref 4.8–5.6)
HCV AB S/CO SERPL IA: <0.1 S/CO RATIO (ref 0–0.9)

## 2019-05-14 RX ORDER — POTASSIUM CHLORIDE 750 MG/1
TABLET, FILM COATED, EXTENDED RELEASE ORAL
Qty: 90 TAB | Refills: 0 | Status: SHIPPED | OUTPATIENT
Start: 2019-05-14 | End: 2019-09-11 | Stop reason: SDUPTHER

## 2019-05-28 DIAGNOSIS — F41.9 ANXIETY: ICD-10-CM

## 2019-05-28 RX ORDER — CLONAZEPAM 0.5 MG/1
TABLET ORAL
Qty: 90 TAB | Refills: 1 | Status: SHIPPED | OUTPATIENT
Start: 2019-05-28 | End: 2019-12-02 | Stop reason: SDUPTHER

## 2019-05-28 NOTE — TELEPHONE ENCOUNTER
Pharmacy on file verified  Requested Prescriptions     Pending Prescriptions Disp Refills    clonazePAM (KLONOPIN) 0.5 mg tablet 90 Tab 1     Sig: TAKE 1 TABLET EVERY NIGHT. MAX DAILY DOSE OF 0.5MG.      LOV 4/30/19  NOV 10/29/19  LF 11/20/2018

## 2019-06-04 ENCOUNTER — HOSPITAL ENCOUNTER (OUTPATIENT)
Dept: INFUSION THERAPY | Age: 69
Discharge: HOME | End: 2019-06-04
Payer: MEDICARE

## 2019-06-04 VITALS
DIASTOLIC BLOOD PRESSURE: 77 MMHG | HEART RATE: 80 BPM | OXYGEN SATURATION: 96 % | TEMPERATURE: 97.1 F | RESPIRATION RATE: 18 BRPM | SYSTOLIC BLOOD PRESSURE: 136 MMHG

## 2019-06-04 PROCEDURE — 96372 THER/PROPH/DIAG INJ SC/IM: CPT

## 2019-06-04 PROCEDURE — 74011250636 HC RX REV CODE- 250/636: Performed by: INTERNAL MEDICINE

## 2019-06-04 RX ADMIN — DENOSUMAB 60 MG: 60 INJECTION SUBCUTANEOUS at 13:35

## 2019-06-04 NOTE — PROGRESS NOTES
1315 Pt arrived at [facility] ambulatory and in no distress for Prolia. Assessment completed, no new complaints voiced. Ca level 9.4 on 4/30/19. Prolia discussed. Manufacture brochure explained and given. Visit Vitals  /77   Pulse 80   Temp 97.1 °F (36.2 °C)   Resp 18   SpO2 96%       Medications received:  Prolia    1345  Pt monitored 10 minutes post primary injection. Tolerated treatment well, no adverse reaction noted. D/Cd from [facility] ambulatory and in no distress accompanied by spouse. Next appt 12/3 @ 1300.

## 2019-06-27 ENCOUNTER — OFFICE VISIT (OUTPATIENT)
Dept: INTERNAL MEDICINE CLINIC | Age: 69
End: 2019-06-27

## 2019-06-27 VITALS
HEART RATE: 83 BPM | SYSTOLIC BLOOD PRESSURE: 132 MMHG | TEMPERATURE: 98.3 F | WEIGHT: 253.4 LBS | RESPIRATION RATE: 20 BRPM | HEIGHT: 69 IN | OXYGEN SATURATION: 95 % | BODY MASS INDEX: 37.53 KG/M2 | DIASTOLIC BLOOD PRESSURE: 80 MMHG

## 2019-06-27 DIAGNOSIS — R05.9 COUGH: Primary | ICD-10-CM

## 2019-06-27 RX ORDER — BUDESONIDE AND FORMOTEROL FUMARATE DIHYDRATE 160; 4.5 UG/1; UG/1
2 AEROSOL RESPIRATORY (INHALATION) 2 TIMES DAILY
Qty: 1 INHALER | Refills: 0 | Status: SHIPPED | OUTPATIENT
Start: 2019-06-27 | End: 2019-10-01 | Stop reason: SDUPTHER

## 2019-06-27 NOTE — PROGRESS NOTES
Michela Flores is a 79 y.o. female presenting for Wheezing and Cough  . 1. Have you been to the ER, urgent care clinic since your last visit? Hospitalized since your last visit? No    2. Have you seen or consulted any other health care providers outside of the [address] [place] [address] since your last visit? Include any pap smears or colon screening. Yes When: 6-24-19 Where: Dr Geraldine Jung Reason for visit: Kidney follow up/labs. Fall Risk Assessment, last 12 mths 1/31/2019   Able to walk? Yes   Fall in past 12 months? Yes   Fall with injury? No   Number of falls in past 12 months 1   Fall Risk Score 1         Abuse Screening Questionnaire 1/31/2019   Do you ever feel afraid of your partner? N   Are you in a relationship with someone who physically or mentally threatens you? N   Is it safe for you to go home? Y       3 most recent PHQ Screens 1/31/2019   Little interest or pleasure in doing things Not at all   Feeling down, depressed, irritable, or hopeless Not at all   Total Score PHQ 2 0   Trouble falling or staying asleep, or sleeping too much Not at all   Feeling tired or having little energy More than half the days   Poor appetite, weight loss, or overeating Not at all   Feeling bad about yourself - or that you are a failure or have let yourself or your family down Not at all   Trouble concentrating on things such as school, work, reading, or watching TV Not at all   Moving or speaking so slowly that other people could have noticed; or the opposite being so fidgety that others notice Not at all   Thoughts of being better off dead, or hurting yourself in some way Not at all   PHQ 9 Score 2   How difficult have these problems made it for you to do your work, take care of your home and get along with others Not difficult at all       There are no discontinued medications.

## 2019-06-27 NOTE — PROGRESS NOTES
This note will not be viewable in [address] Subjective:     Mrs. Edwina Young is seen in the office today with complaints of cough and wheezing. Symptoms began for 5 days ago. She notes that she will note some wheezing late in the day and then early in the morning upon arising. The patient denies any fevers or chills. She has had some runny nose, watery eyes and sneezing. She denies any purulent sputum production. The patient does have a history of cardiac disease with chronic systolic congestive heart failure and ischemic cardiomyopathy but has noted no PND or orthopnea. She does have chronic lower extremity edema but this has not worsened over time. She has been using an albuterol inhaler more frequently. Past Medical History:   Diagnosis Date    Anxiety 1/22/2018    Arthritis     OA    Asthma     Diabetes ([facility] [address]     Essential hypertension     GERD (gastroesophageal reflux disease)     Hypercholesterolemia 1/22/2018    Hypertension     Ill-defined condition     diverticulitis    Long-term use of high-risk medication 1/22/2018    Neuropathy     Obesity (BMI 30-39.9) 4/30/2019    Other ill-defined conditions(799.89)     IBS, spinal stenosis    Plantar fasciitis     Psychiatric disorder     depression, anxiety    Sleep apnea     uses CPAP    Type 2 diabetes mellitus with diabetic neuropathy, without long-term current use of insulin ([facility] [address] 6/5/2016     Past Surgical History:   Procedure Laterality Date    CARDIAC SURG PROCEDURE UNLIST      stent    COLONOSCOPY N/A 3/14/2018    COLONOSCOPY performed by Mendy [facility]cada [facility] at [facility] ENDOSCOPY    HX APPENDECTOMY      HX CHOLECYSTECTOMY  11/15/2018    HX HYSTERECTOMY      HX PACEMAKER       Allergies   Allergen Reactions    Sulfa (Sulfonamide Antibiotics) Swelling    Amoxicillin Swelling     Current Outpatient Medications   Medication Sig Dispense Refill    clonazePAM (KLONOPIN) 0.5 mg tablet TAKE 1 TABLET EVERY NIGHT.  MAX DAILY DOSE OF 0.5MG. 90 Tab 1    potassium chloride SR (KLOR-CON 10) 10 mEq tablet TAKE 1 TABLET EVERY DAY 90 Tab 0    lisinopril (PRINIVIL, ZESTRIL) 40 mg tablet Take 40 mg by mouth daily.  diphenhydrAMINE (BENADRYL) 25 mg capsule Take 25 mg by mouth two (2) times daily as needed (Allergies).  nystatin (MYCOSTATIN) topical cream Apply  to affected area two (2) times daily as needed for Skin Irritation (Rash).  traMADol (ULTRAM) 50 mg tablet Take 50 mg by mouth daily as needed for Pain (Used to supplement the Lyrica when lyrica doesnt manage the pain on its own).  albuterol (PROVENTIL HFA, VENTOLIN HFA, PROAIR HFA) 90 mcg/actuation inhaler Take 1 Puff by inhalation two (2) times a day. 1 Inhaler 6    dabigatran etexilate (PRADAXA) 150 mg capsule Take 1 Cap by mouth two (2) times a day. IF NO BLEEDING AT CATH SITE. 60 Cap 12    bumetanide (BUMEX) 2 mg tablet Take 1 Tab by mouth daily. 90 Tab 3    amLODIPine (NORVASC) 2.5 mg tablet Take 1 Tab by mouth daily. 90 Tab 3    omeprazole (PRILOSEC) 40 mg capsule Take 1 Cap by mouth daily. 90 Cap 3    metoprolol succinate (TOPROL-XL) 100 mg tablet Take 1 Tab by mouth daily. 30 Tab prn    acetaminophen (TYLENOL EXTRA STRENGTH) 500 mg tablet Take 1,000 mg by mouth every eight (8) hours as needed for Pain (Headache).  lidocaine (ASPERCREME, LIDOCAINE,) 4 % topical cream Apply  to affected area two (2) times daily as needed for Pain (Knee pain).  sodium chloride (AYR SALINE) 0.65 % nasal squeeze bottle 1 [place] by Both Nostrils route two (2) times a day.  conjugated estrogens (PREMARIN) 0.625 mg/gram vaginal cream Insert 0.5 g into vagina two (2) days a week. Tuesdays and Thursdays      venlafaxine-SR (EFFEXOR XR) 150 mg capsule Take 150 mg by mouth two (2) times daily (with meals).  pregabalin (LYRICA) 200 mg capsule Take 200 mg by mouth two (2) times a day.       cholecalciferol, vitamin d3, (VITAMIN D3) 400 unit cap Take 400 Units by mouth daily.      aspirin 81 mg chewable tablet Take 81 mg by mouth daily.  atorvastatin (LIPITOR) 40 mg tablet Take 1 Tab by mouth nightly. 27 Tab 12     Social History     Socioeconomic History    Marital status:      Spouse name: Not on file    Number of children: Not on file    Years of education: Not on file    Highest education level: Not on file   Tobacco Use    Smoking status: Never Smoker    Smokeless tobacco: Never Used   Substance and Sexual Activity    Alcohol use: No    Drug use: No     Family History   Problem Relation Age of Onset    Arthritis-osteo Mother     Hypertension Mother     High Cholesterol Mother     Crohn's Disease Mother     Heart Disease Mother     Alcohol abuse Father     High Cholesterol Sister     Hypertension Sister     Thyroid Disease Sister     COPD Sister     High Cholesterol Brother     Hypertension Brother     COPD Brother     COPD Child     Inflammatory Bowel Dz Child        Review of Systems:  GEN: no weight loss, weight gain, fatigue or night sweats  CV: no PND, orthopnea, or palpitations  Resp: no dyspnea on exertion, no cough  Abd: no nausea, vomiting or diarrhea  EXT: denies edema, claudication  Endocrine: no hair loss, excessive thirst or polyuria  Neurological ROS: no TIA or stroke symptoms  ROS otherwise negative      Objective:     Visit Vitals  /80 (BP 1 Location: Left arm, BP Patient Position: Sitting)   Pulse 83   Temp 98.3 °F (36.8 °C) (Oral)   Resp 20   Ht 5' 9\" (1.753 m)   Wt 253 lb 6.4 oz (114.9 kg)   SpO2 95%   BMI 37.42 kg/m²     Body mass index is 37.42 kg/m². General:   alert, cooperative and no distress   Eyes: conjunctivae/sclerae clear. PERRL, EOM's intact   Mouth:  No oral lesions, no pharyngeal erythema, no exudates   Neck: Trachea midline, no thyromegaly, no bruits   Heart: S1 and S2 normal,no murmurs noted    Lungs: Clear to auscultation bilaterally, no increased work of breathing   Abdomen: Soft, nontender. Normal bowel sounds   Extremities: No edema or cyanosis   Neuro: ..alert, oriented x3,speech normal in context and clarity, cranial nerves II-XII intact,motor strength: full proximally and distally,gait: normal  reflexes: full and symmetric     Physical exam otherwise negative         Assessment/Plan:     Diagnoses and all orders for this visit:    Cough  -     XR CHEST PA LAT; Future        Other instructions:   Medications were reviewed and reconciled. No change in her current medical regimen will be made. We will add Symbicort to her regimen to see if this is helpful in preventing her wheezing. Body mass index was 37.4 and dietary counseling along with printed patient education was given    Chest x-ray was reviewed and cardiomegaly was present but there was no evidence of any heart failure or pleural effusions. We will not change the dosage of her diuretic at this time. Follow-up if there is any worsening    Follow-up and Dispositions    · Return if symptoms worsen or fail to improve.          Lawrence Zimmerman MD

## 2019-06-27 NOTE — PATIENT INSTRUCTIONS
Learning About Cutting Calories How do calories affect your weight? Food gives your body energy. Energy from the food you eat is measured in calories. This energy keeps your heart beating, your brain active, and your muscles working. Your body needs a certain number of calories each day. After your body uses the calories it needs, it stores extra calories as fat. To lose weight safely, you have to eat fewer calories while eating in a healthy way. How many calories do you need each day? The more active you are, the more calories you need. When you are less active, you need fewer calories. How many calories you need each day also depends on several things, including your age and whether you are male or female. Here are some general guidelines for adults: 
· Less active women and older adults need 1,600 to 2,000 calories each day. · Active women and less active men need 2,000 to 2,400 calories each day. · Active men need 2,400 to 3,000 calories each day. How can you cut calories and eat healthy meals? Whole grains, vegetables and fruits, and dried beans are good lower-calorie foods. They give you lots of nutrients and fiber. And they fill you up. Sweets, energy drinks, and soda pop are high in calories. They give you few nutrients and no fiber. Try to limit soda pop, fruit juice, and energy drinks. Drink water instead. Some fats can be part of a healthy diet. But cutting back on fats from highly processed foods like fast foods and many snack foods is a good way to lower the calories in your diet. Also, use smaller amounts of fats like butter, margarine, salad dressing, and mayonnaise. Add fresh garlic, lemon, or herbs to your meals to add flavor without adding fat. Meats and dairy products can be a big source of hidden fats. Try to choose lean or low-fat versions of these products.  
Fat-free cookies, candies, chips, and frozen treats can still be high in sugar and calories. Some fat-free foods have more calories than regular ones. Eat fat-free treats in moderation, as you would other foods. If your favorite foods are high in fat, salt, sugar, or calories, limit how often you eat them. Eat smaller servings, or look for healthy substitutes. Fill up on fruits, vegetables, and whole grains. Eating at home · Use meat as a side dish instead of as the main part of your meal. 
· Try main dishes that use whole wheat pasta, brown rice, dried beans, or vegetables. · Find ways to cook with little or no fat, such as broiling, steaming, or grilling. · Use cooking spray instead of oil. If you use oil, use a monounsaturated oil, such as canola or olive oil. · Trim fat from meats before you cook them. · Drain off fat after you brown the meat or while you roast it. · Chill soups and stews after you cook them. Then skim the fat off the top after it hardens. Eating out · Order foods that are broiled or poached rather than fried or breaded. · Cut back on the amount of butter or margarine that you use on bread. · Order sauces, gravies, and salad dressings on the side, and use only a little. · When you order pasta, choose tomato sauce rather than cream sauce. · Ask for salsa with your baked potato instead of sour cream, butter, cheese, or beltran. · Order meals in a small size instead of upgrading to a large. · Share an entree, or take part of your food home to eat as another meal. 
· Share appetizers and desserts. Where can you learn more? Go to http://dawood-vera.info/. Enter 99 [id number] in the search box to learn more about \"Learning About Cutting Calories. \" Current as of: March 28, 2018 Content Version: 11.9 © 4301-8389 [employer]. Care instructions adapted under license by [employer] (which disclaims liability or warranty for this information).  If you have questions about a medical condition or this instruction, always ask your healthcare professional. [place] [address] any warranty or liability for your use of this information.

## 2019-08-17 ENCOUNTER — APPOINTMENT (OUTPATIENT)
Dept: GENERAL RADIOLOGY | Age: 69
DRG: 291 | End: 2019-08-17
Attending: EMERGENCY MEDICINE
Payer: MEDICARE

## 2019-08-17 ENCOUNTER — HOSPITAL ENCOUNTER (INPATIENT)
Age: 69
LOS: 4 days | Discharge: HOME | DRG: 291 | End: 2019-08-21
Attending: EMERGENCY MEDICINE | Admitting: INTERNAL MEDICINE
Payer: MEDICARE

## 2019-08-17 ENCOUNTER — APPOINTMENT (OUTPATIENT)
Dept: NON INVASIVE DIAGNOSTICS | Age: 69
DRG: 291 | End: 2019-08-17
Attending: INTERNAL MEDICINE
Payer: MEDICARE

## 2019-08-17 DIAGNOSIS — R09.02 HYPOXEMIA: ICD-10-CM

## 2019-08-17 DIAGNOSIS — J45.21 MILD INTERMITTENT ASTHMA WITH ACUTE EXACERBATION: Primary | ICD-10-CM

## 2019-08-17 PROBLEM — J45.901 ACUTE ASTHMA EXACERBATION: Status: ACTIVE | Noted: 2019-08-17

## 2019-08-17 LAB
ANION GAP SERPL CALC-SCNC: 7 MMOL/L (ref 5–15)
ARTERIAL PATENCY WRIST A: NORMAL
ATRIAL RATE: 91 BPM
BASE EXCESS BLDV CALC-SCNC: 3 MMOL/L
BASOPHILS # BLD: 0 K/UL (ref 0–0.1)
BASOPHILS NFR BLD: 0 % (ref 0–1)
BDY SITE: NORMAL
BNP SERPL-MCNC: 5632 PG/ML
BUN SERPL-MCNC: 21 MG/DL (ref 6–20)
BUN/CREAT SERPL: 14 (ref 12–20)
CALCIUM SERPL-MCNC: 8.4 MG/DL (ref 8.5–10.1)
CALCULATED R AXIS, ECG10: -94 DEGREES
CALCULATED T AXIS, ECG11: 52 DEGREES
CHLORIDE SERPL-SCNC: 106 MMOL/L (ref 97–108)
CO2 SERPL-SCNC: 30 MMOL/L (ref 21–32)
CREAT SERPL-MCNC: 1.45 MG/DL (ref 0.55–1.02)
DIAGNOSIS, 93000: NORMAL
DIFFERENTIAL METHOD BLD: ABNORMAL
ECHO AO ROOT DIAM: 3.17 CM
ECHO AV AREA PEAK VELOCITY: 2.3 CM2
ECHO AV AREA/BSA PEAK VELOCITY: 1 CM2/M2
ECHO AV PEAK GRADIENT: 9.6 MMHG
ECHO AV PEAK VELOCITY: 154.94 CM/S
ECHO EST RA PRESSURE: 8 MMHG
ECHO LA AREA 2C: 21.45 CM2
ECHO LA AREA 4C: 21.2 CM2
ECHO LA MAJOR AXIS: 3.92 CM
ECHO LA TO AORTIC ROOT RATIO: 1.24
ECHO LA VOL 2C: 67.91 ML (ref 22–52)
ECHO LA VOL 4C: 62.65 ML (ref 22–52)
ECHO LA VOL BP: 68.9 ML (ref 22–52)
ECHO LA VOL/BSA BIPLANE: 30.17 ML/M2 (ref 16–28)
ECHO LA VOLUME INDEX A2C: 29.73 ML/M2 (ref 16–28)
ECHO LA VOLUME INDEX A4C: 27.43 ML/M2 (ref 16–28)
ECHO LV E' SEPTAL VELOCITY: 5.28 CM/S
ECHO LV INTERNAL DIMENSION DIASTOLIC: 5.76 CM (ref 3.9–5.3)
ECHO LV INTERNAL DIMENSION SYSTOLIC: 5 CM
ECHO LV IVSD: 1.52 CM (ref 0.6–0.9)
ECHO LV MASS 2D: 497.4 G (ref 67–162)
ECHO LV MASS INDEX 2D: 217.8 G/M2 (ref 43–95)
ECHO LV POSTERIOR WALL DIASTOLIC: 1.53 CM (ref 0.6–0.9)
ECHO LVOT DIAM: 2.13 CM
ECHO LVOT PEAK GRADIENT: 4.1 MMHG
ECHO LVOT PEAK VELOCITY: 100.73 CM/S
ECHO MV REGURGITANT PEAK GRADIENT: 98.2 MMHG
ECHO MV REGURGITANT PEAK VELOCITY: 495.45 CM/S
ECHO PULMONARY ARTERY SYSTOLIC PRESSURE (PASP): 40.9 MMHG
ECHO PV MAX VELOCITY: 59.54 CM/S
ECHO PV PEAK GRADIENT: 1.4 MMHG
ECHO RIGHT VENTRICULAR SYSTOLIC PRESSURE (RVSP): 40.9 MMHG
ECHO RV INTERNAL DIMENSION: 5.18 CM
ECHO TV REGURGITANT MAX VELOCITY: 286.9 CM/S
ECHO TV REGURGITANT PEAK GRADIENT: 32.9 MMHG
EOSINOPHIL # BLD: 0.3 K/UL (ref 0–0.4)
EOSINOPHIL NFR BLD: 3 % (ref 0–7)
ERYTHROCYTE [DISTWIDTH] IN BLOOD BY AUTOMATED COUNT: 17.8 % (ref 11.5–14.5)
GAS FLOW.O2 O2 DELIVERY SYS: NORMAL L/MIN
GAS FLOW.O2 SETTING OXYMISER: 3 L/M
GLUCOSE BLD STRIP.AUTO-MCNC: 315 MG/DL (ref 65–100)
GLUCOSE BLD STRIP.AUTO-MCNC: 384 MG/DL (ref 65–100)
GLUCOSE BLD STRIP.AUTO-MCNC: 410 MG/DL (ref 65–100)
GLUCOSE BLD STRIP.AUTO-MCNC: 414 MG/DL (ref 65–100)
GLUCOSE SERPL-MCNC: 163 MG/DL (ref 65–100)
HCO3 BLDV-SCNC: 27.7 MMOL/L (ref 23–28)
HCT VFR BLD AUTO: 38.1 % (ref 35–47)
HGB BLD-MCNC: 11.7 G/DL (ref 11.5–16)
IMM GRANULOCYTES # BLD AUTO: 0.1 K/UL (ref 0–0.04)
IMM GRANULOCYTES NFR BLD AUTO: 1 % (ref 0–0.5)
LYMPHOCYTES # BLD: 1.4 K/UL (ref 0.8–3.5)
LYMPHOCYTES NFR BLD: 15 % (ref 12–49)
MCH RBC QN AUTO: 27.7 PG (ref 26–34)
MCHC RBC AUTO-ENTMCNC: 30.7 G/DL (ref 30–36.5)
MCV RBC AUTO: 90.3 FL (ref 80–99)
MONOCYTES # BLD: 0.6 K/UL (ref 0–1)
MONOCYTES NFR BLD: 7 % (ref 5–13)
NEUTS SEG # BLD: 6.4 K/UL (ref 1.8–8)
NEUTS SEG NFR BLD: 74 % (ref 32–75)
NRBC # BLD: 0.02 K/UL (ref 0–0.01)
NRBC BLD-RTO: 0.2 PER 100 WBC
PCO2 BLDV: 43.9 MMHG (ref 41–51)
PH BLDV: 7.41 [PH] (ref 7.32–7.42)
PLATELET # BLD AUTO: 197 K/UL (ref 150–400)
PMV BLD AUTO: 11.9 FL (ref 8.9–12.9)
PO2 BLDV: 39 MMHG (ref 25–40)
POTASSIUM SERPL-SCNC: 3.1 MMOL/L (ref 3.5–5.1)
Q-T INTERVAL, ECG07: 452 MS
QRS DURATION, ECG06: 134 MS
QTC CALCULATION (BEZET), ECG08: 521 MS
RBC # BLD AUTO: 4.22 M/UL (ref 3.8–5.2)
SAO2 % BLDV: 73 % (ref 65–88)
SERVICE CMNT-IMP: ABNORMAL
SODIUM SERPL-SCNC: 143 MMOL/L (ref 136–145)
SPECIMEN TYPE: NORMAL
TOTAL RESP. RATE, ITRR: 31
TROPONIN I SERPL-MCNC: 0.06 NG/ML
TROPONIN I SERPL-MCNC: <0.05 NG/ML
VENTRICULAR RATE, ECG03: 80 BPM
WBC # BLD AUTO: 8.7 K/UL (ref 3.6–11)

## 2019-08-17 PROCEDURE — 82962 GLUCOSE BLOOD TEST: CPT

## 2019-08-17 PROCEDURE — 74011000250 HC RX REV CODE- 250: Performed by: EMERGENCY MEDICINE

## 2019-08-17 PROCEDURE — 74011000250 HC RX REV CODE- 250: Performed by: INTERNAL MEDICINE

## 2019-08-17 PROCEDURE — 36415 COLL VENOUS BLD VENIPUNCTURE: CPT

## 2019-08-17 PROCEDURE — 74011250636 HC RX REV CODE- 250/636: Performed by: EMERGENCY MEDICINE

## 2019-08-17 PROCEDURE — 84484 ASSAY OF TROPONIN QUANT: CPT

## 2019-08-17 PROCEDURE — 94640 AIRWAY INHALATION TREATMENT: CPT

## 2019-08-17 PROCEDURE — 74011636637 HC RX REV CODE- 636/637: Performed by: FAMILY MEDICINE

## 2019-08-17 PROCEDURE — 65660000000 HC RM CCU STEPDOWN

## 2019-08-17 PROCEDURE — 93005 ELECTROCARDIOGRAM TRACING: CPT

## 2019-08-17 PROCEDURE — 99285 EMERGENCY DEPT VISIT HI MDM: CPT

## 2019-08-17 PROCEDURE — 74011250636 HC RX REV CODE- 250/636: Performed by: INTERNAL MEDICINE

## 2019-08-17 PROCEDURE — 93306 TTE W/DOPPLER COMPLETE: CPT

## 2019-08-17 PROCEDURE — 85025 COMPLETE CBC W/AUTO DIFF WBC: CPT

## 2019-08-17 PROCEDURE — 71046 X-RAY EXAM CHEST 2 VIEWS: CPT

## 2019-08-17 PROCEDURE — 80048 BASIC METABOLIC PNL TOTAL CA: CPT

## 2019-08-17 PROCEDURE — 74011250637 HC RX REV CODE- 250/637: Performed by: EMERGENCY MEDICINE

## 2019-08-17 PROCEDURE — 83880 ASSAY OF NATRIURETIC PEPTIDE: CPT

## 2019-08-17 PROCEDURE — 74011636637 HC RX REV CODE- 636/637: Performed by: INTERNAL MEDICINE

## 2019-08-17 PROCEDURE — 77030029684 HC NEB SM VOL KT MONA -A

## 2019-08-17 PROCEDURE — 82803 BLOOD GASES ANY COMBINATION: CPT

## 2019-08-17 PROCEDURE — 74011250637 HC RX REV CODE- 250/637: Performed by: INTERNAL MEDICINE

## 2019-08-17 PROCEDURE — 96374 THER/PROPH/DIAG INJ IV PUSH: CPT

## 2019-08-17 PROCEDURE — 77010033678 HC OXYGEN DAILY

## 2019-08-17 RX ORDER — AMLODIPINE BESYLATE 5 MG/1
5 TABLET ORAL
Status: COMPLETED | OUTPATIENT
Start: 2019-08-17 | End: 2019-08-17

## 2019-08-17 RX ORDER — PREDNISONE 20 MG/1
40 TABLET ORAL DAILY
Qty: 10 TAB | Refills: 0 | Status: SHIPPED | OUTPATIENT
Start: 2019-08-17 | End: 2019-08-21

## 2019-08-17 RX ORDER — BUMETANIDE 1 MG/1
2 TABLET ORAL DAILY
Status: DISCONTINUED | OUTPATIENT
Start: 2019-08-18 | End: 2019-08-17

## 2019-08-17 RX ORDER — AMLODIPINE BESYLATE 5 MG/1
2.5 TABLET ORAL DAILY
Status: DISCONTINUED | OUTPATIENT
Start: 2019-08-18 | End: 2019-08-19

## 2019-08-17 RX ORDER — ALBUTEROL SULFATE 90 UG/1
1 AEROSOL, METERED RESPIRATORY (INHALATION)
Qty: 1 INHALER | Refills: 6 | Status: SHIPPED | OUTPATIENT
Start: 2019-08-17

## 2019-08-17 RX ORDER — GUAIFENESIN 100 MG/5ML
81 LIQUID (ML) ORAL DAILY
Status: DISCONTINUED | OUTPATIENT
Start: 2019-08-18 | End: 2019-08-21 | Stop reason: HOSPADM

## 2019-08-17 RX ORDER — DEXTROSE MONOHYDRATE 100 MG/ML
125-250 INJECTION, SOLUTION INTRAVENOUS AS NEEDED
Status: DISCONTINUED | OUTPATIENT
Start: 2019-08-17 | End: 2019-08-21 | Stop reason: HOSPADM

## 2019-08-17 RX ORDER — ONDANSETRON 2 MG/ML
4 INJECTION INTRAMUSCULAR; INTRAVENOUS
Status: DISCONTINUED | OUTPATIENT
Start: 2019-08-17 | End: 2019-08-21 | Stop reason: HOSPADM

## 2019-08-17 RX ORDER — POTASSIUM CHLORIDE 750 MG/1
40 TABLET, FILM COATED, EXTENDED RELEASE ORAL
Status: DISCONTINUED | OUTPATIENT
Start: 2019-08-17 | End: 2019-08-17

## 2019-08-17 RX ORDER — LISINOPRIL 20 MG/1
40 TABLET ORAL
Status: COMPLETED | OUTPATIENT
Start: 2019-08-17 | End: 2019-08-17

## 2019-08-17 RX ORDER — CLONAZEPAM 0.5 MG/1
0.5 TABLET ORAL
Status: DISCONTINUED | OUTPATIENT
Start: 2019-08-17 | End: 2019-08-21 | Stop reason: HOSPADM

## 2019-08-17 RX ORDER — METOPROLOL SUCCINATE 50 MG/1
100 TABLET, EXTENDED RELEASE ORAL DAILY
Status: DISCONTINUED | OUTPATIENT
Start: 2019-08-18 | End: 2019-08-21 | Stop reason: HOSPADM

## 2019-08-17 RX ORDER — BUMETANIDE 0.25 MG/ML
1 INJECTION INTRAMUSCULAR; INTRAVENOUS 2 TIMES DAILY
Status: DISCONTINUED | OUTPATIENT
Start: 2019-08-17 | End: 2019-08-21 | Stop reason: HOSPADM

## 2019-08-17 RX ORDER — MAGNESIUM SULFATE 100 %
4 CRYSTALS MISCELLANEOUS AS NEEDED
Status: DISCONTINUED | OUTPATIENT
Start: 2019-08-17 | End: 2019-08-21 | Stop reason: HOSPADM

## 2019-08-17 RX ORDER — IPRATROPIUM BROMIDE AND ALBUTEROL SULFATE 2.5; .5 MG/3ML; MG/3ML
3 SOLUTION RESPIRATORY (INHALATION)
Status: DISCONTINUED | OUTPATIENT
Start: 2019-08-17 | End: 2019-08-20

## 2019-08-17 RX ORDER — INSULIN LISPRO 100 [IU]/ML
6 INJECTION, SOLUTION INTRAVENOUS; SUBCUTANEOUS ONCE
Status: COMPLETED | OUTPATIENT
Start: 2019-08-17 | End: 2019-08-17

## 2019-08-17 RX ORDER — POTASSIUM CHLORIDE 750 MG/1
30 TABLET, FILM COATED, EXTENDED RELEASE ORAL
Status: COMPLETED | OUTPATIENT
Start: 2019-08-17 | End: 2019-08-17

## 2019-08-17 RX ORDER — POTASSIUM CHLORIDE 750 MG/1
10 TABLET, FILM COATED, EXTENDED RELEASE ORAL
Status: DISCONTINUED | OUTPATIENT
Start: 2019-08-17 | End: 2019-08-17

## 2019-08-17 RX ORDER — ALBUTEROL SULFATE 0.83 MG/ML
1.25 SOLUTION RESPIRATORY (INHALATION)
Status: DISCONTINUED | OUTPATIENT
Start: 2019-08-17 | End: 2019-08-17

## 2019-08-17 RX ORDER — DOCUSATE SODIUM 100 MG/1
100 CAPSULE, LIQUID FILLED ORAL
Status: DISCONTINUED | OUTPATIENT
Start: 2019-08-17 | End: 2019-08-21 | Stop reason: HOSPADM

## 2019-08-17 RX ORDER — POTASSIUM CHLORIDE 750 MG/1
20 TABLET, FILM COATED, EXTENDED RELEASE ORAL
Status: DISCONTINUED | OUTPATIENT
Start: 2019-08-17 | End: 2019-08-17

## 2019-08-17 RX ORDER — PANTOPRAZOLE SODIUM 40 MG/1
40 TABLET, DELAYED RELEASE ORAL
Status: DISCONTINUED | OUTPATIENT
Start: 2019-08-18 | End: 2019-08-21 | Stop reason: HOSPADM

## 2019-08-17 RX ORDER — ATORVASTATIN CALCIUM 40 MG/1
40 TABLET, FILM COATED ORAL
Status: DISCONTINUED | OUTPATIENT
Start: 2019-08-17 | End: 2019-08-21 | Stop reason: HOSPADM

## 2019-08-17 RX ORDER — ALBUTEROL SULFATE 0.83 MG/ML
2.5 SOLUTION RESPIRATORY (INHALATION)
Status: COMPLETED | OUTPATIENT
Start: 2019-08-17 | End: 2019-08-17

## 2019-08-17 RX ORDER — ACETAMINOPHEN 325 MG/1
650 TABLET ORAL
Status: DISCONTINUED | OUTPATIENT
Start: 2019-08-17 | End: 2019-08-21 | Stop reason: HOSPADM

## 2019-08-17 RX ORDER — IPRATROPIUM BROMIDE AND ALBUTEROL SULFATE 2.5; .5 MG/3ML; MG/3ML
3 SOLUTION RESPIRATORY (INHALATION)
Status: DISCONTINUED | OUTPATIENT
Start: 2019-08-17 | End: 2019-08-21 | Stop reason: HOSPADM

## 2019-08-17 RX ORDER — SODIUM CHLORIDE 0.9 % (FLUSH) 0.9 %
5-40 SYRINGE (ML) INJECTION AS NEEDED
Status: DISCONTINUED | OUTPATIENT
Start: 2019-08-17 | End: 2019-08-21 | Stop reason: HOSPADM

## 2019-08-17 RX ORDER — METOPROLOL TARTRATE 50 MG/1
50 TABLET ORAL
Status: COMPLETED | OUTPATIENT
Start: 2019-08-17 | End: 2019-08-17

## 2019-08-17 RX ORDER — LISINOPRIL 20 MG/1
40 TABLET ORAL DAILY
Status: DISCONTINUED | OUTPATIENT
Start: 2019-08-18 | End: 2019-08-21 | Stop reason: HOSPADM

## 2019-08-17 RX ORDER — INSULIN LISPRO 100 [IU]/ML
INJECTION, SOLUTION INTRAVENOUS; SUBCUTANEOUS
Status: DISCONTINUED | OUTPATIENT
Start: 2019-08-18 | End: 2019-08-21 | Stop reason: HOSPADM

## 2019-08-17 RX ORDER — DABIGATRAN ETEXILATE 150 MG/1
150 CAPSULE ORAL 2 TIMES DAILY
Status: DISCONTINUED | OUTPATIENT
Start: 2019-08-17 | End: 2019-08-21 | Stop reason: HOSPADM

## 2019-08-17 RX ORDER — FLUTICASONE FUROATE AND VILANTEROL 100; 25 UG/1; UG/1
1 POWDER RESPIRATORY (INHALATION) DAILY
Status: DISCONTINUED | OUTPATIENT
Start: 2019-08-17 | End: 2019-08-21 | Stop reason: HOSPADM

## 2019-08-17 RX ORDER — POTASSIUM CHLORIDE 750 MG/1
20 TABLET, FILM COATED, EXTENDED RELEASE ORAL
Status: COMPLETED | OUTPATIENT
Start: 2019-08-17 | End: 2019-08-17

## 2019-08-17 RX ORDER — INSULIN LISPRO 100 [IU]/ML
INJECTION, SOLUTION INTRAVENOUS; SUBCUTANEOUS
Status: DISCONTINUED | OUTPATIENT
Start: 2019-08-17 | End: 2019-08-17

## 2019-08-17 RX ORDER — INSULIN LISPRO 100 [IU]/ML
INJECTION, SOLUTION INTRAVENOUS; SUBCUTANEOUS
Status: DISCONTINUED | OUTPATIENT
Start: 2019-08-18 | End: 2019-08-17

## 2019-08-17 RX ORDER — SODIUM CHLORIDE 0.9 % (FLUSH) 0.9 %
5-40 SYRINGE (ML) INJECTION EVERY 8 HOURS
Status: DISCONTINUED | OUTPATIENT
Start: 2019-08-17 | End: 2019-08-21 | Stop reason: HOSPADM

## 2019-08-17 RX ORDER — BUMETANIDE 0.25 MG/ML
0.5 INJECTION INTRAMUSCULAR; INTRAVENOUS
Status: COMPLETED | OUTPATIENT
Start: 2019-08-17 | End: 2019-08-17

## 2019-08-17 RX ORDER — MAGNESIUM SULFATE 100 %
4 CRYSTALS MISCELLANEOUS AS NEEDED
Status: DISCONTINUED | OUTPATIENT
Start: 2019-08-17 | End: 2019-08-17

## 2019-08-17 RX ORDER — FUROSEMIDE 10 MG/ML
40 INJECTION INTRAMUSCULAR; INTRAVENOUS 2 TIMES DAILY
Status: DISCONTINUED | OUTPATIENT
Start: 2019-08-17 | End: 2019-08-17

## 2019-08-17 RX ORDER — VENLAFAXINE HYDROCHLORIDE 150 MG/1
150 CAPSULE, EXTENDED RELEASE ORAL 2 TIMES DAILY WITH MEALS
Status: DISCONTINUED | OUTPATIENT
Start: 2019-08-17 | End: 2019-08-21 | Stop reason: HOSPADM

## 2019-08-17 RX ADMIN — Medication 10 ML: at 15:29

## 2019-08-17 RX ADMIN — METHYLPREDNISOLONE SODIUM SUCCINATE 40 MG: 40 INJECTION, POWDER, FOR SOLUTION INTRAMUSCULAR; INTRAVENOUS at 15:29

## 2019-08-17 RX ADMIN — POTASSIUM CHLORIDE 20 MEQ: 750 TABLET, EXTENDED RELEASE ORAL at 09:57

## 2019-08-17 RX ADMIN — METOPROLOL TARTRATE 50 MG: 50 TABLET ORAL at 10:09

## 2019-08-17 RX ADMIN — PREGABALIN 200 MG: 150 CAPSULE ORAL at 17:12

## 2019-08-17 RX ADMIN — INSULIN LISPRO 6 UNITS: 100 INJECTION, SOLUTION INTRAVENOUS; SUBCUTANEOUS at 23:19

## 2019-08-17 RX ADMIN — HUMAN INSULIN 5 UNITS: 100 INJECTION, SUSPENSION SUBCUTANEOUS at 23:18

## 2019-08-17 RX ADMIN — LISINOPRIL 40 MG: 20 TABLET ORAL at 10:09

## 2019-08-17 RX ADMIN — VENLAFAXINE HYDROCHLORIDE 150 MG: 150 CAPSULE, EXTENDED RELEASE ORAL at 17:13

## 2019-08-17 RX ADMIN — ALBUTEROL SULFATE 2.5 MG: 2.5 SOLUTION RESPIRATORY (INHALATION) at 08:24

## 2019-08-17 RX ADMIN — BUMETANIDE 0.5 MG: 0.25 INJECTION INTRAMUSCULAR; INTRAVENOUS at 08:11

## 2019-08-17 RX ADMIN — IPRATROPIUM BROMIDE AND ALBUTEROL SULFATE 3 ML: .5; 3 SOLUTION RESPIRATORY (INHALATION) at 15:43

## 2019-08-17 RX ADMIN — METHYLPREDNISOLONE SODIUM SUCCINATE 125 MG: 125 INJECTION, POWDER, FOR SOLUTION INTRAMUSCULAR; INTRAVENOUS at 08:11

## 2019-08-17 RX ADMIN — ATORVASTATIN CALCIUM 40 MG: 40 TABLET, FILM COATED ORAL at 22:04

## 2019-08-17 RX ADMIN — IPRATROPIUM BROMIDE AND ALBUTEROL SULFATE 3 ML: .5; 3 SOLUTION RESPIRATORY (INHALATION) at 19:27

## 2019-08-17 RX ADMIN — FLUTICASONE FUROATE AND VILANTEROL TRIFENATATE 1 PUFF: 100; 25 POWDER RESPIRATORY (INHALATION) at 18:10

## 2019-08-17 RX ADMIN — Medication 10 ML: at 22:00

## 2019-08-17 RX ADMIN — POTASSIUM CHLORIDE 30 MEQ: 750 TABLET, EXTENDED RELEASE ORAL at 15:29

## 2019-08-17 RX ADMIN — AZITHROMYCIN MONOHYDRATE 500 MG: 500 INJECTION, POWDER, LYOPHILIZED, FOR SOLUTION INTRAVENOUS at 15:29

## 2019-08-17 RX ADMIN — AMLODIPINE BESYLATE 5 MG: 5 TABLET ORAL at 10:09

## 2019-08-17 RX ADMIN — METHYLPREDNISOLONE SODIUM SUCCINATE 40 MG: 40 INJECTION, POWDER, FOR SOLUTION INTRAMUSCULAR; INTRAVENOUS at 18:09

## 2019-08-17 RX ADMIN — BUMETANIDE 1 MG: 0.25 INJECTION INTRAMUSCULAR; INTRAVENOUS at 15:29

## 2019-08-17 RX ADMIN — DABIGATRAN ETEXILATE MESYLATE 150 MG: 150 CAPSULE ORAL at 17:12

## 2019-08-17 RX ADMIN — INSULIN LISPRO 10 UNITS: 100 INJECTION, SOLUTION INTRAVENOUS; SUBCUTANEOUS at 17:13

## 2019-08-17 NOTE — ED NOTES
Pt resting in stretcher. Sleeping, awakens easily to voice. Remains on cardiac monitor. Updated on POC.

## 2019-08-17 NOTE — PROGRESS NOTES
Pharmacy Clarification of Prior to Admission Medication Regimen     The patient was interviewed regarding clarification of the prior to admission medication regimen and was questioned regarding use of any other inhalers, topical products, over the counter medications, herbal medications, vitamin products or ophthalmic/nasal/otic medication use. Information Obtained From: Patient, RxQuery    Pertinent Pharmacy Findings:  Identified High Alert Medication Information  Current Anticoagulants:  Name: Pradaxa  albuterol (PROVENTIL HFA, VENTOLIN HFA, PROAIR HFA) 90 mcg/actuation inhaler: Patient stated she has not used this medication in months. PTA medication list was corrected to the following:     Prior to Admission Medications   Prescriptions Last Dose Informant Patient Reported? Taking?   acetaminophen (TYLENOL EXTRA STRENGTH) 500 mg tablet 8/8/2019 at Unknown time Self Yes Yes   Sig: Take 1,000 mg by mouth every eight (8) hours as needed for Pain (Headache). albuterol (PROVENTIL HFA, VENTOLIN HFA, PROAIR HFA) 90 mcg/actuation inhaler Not Taking at Unknown time Self No No   Sig: Take 1 Puff by inhalation every four (4) hours as needed for Wheezing. amLODIPine (NORVASC) 2.5 mg tablet 8/16/2019 at Unknown time Self No Yes   Sig: Take 1 Tab by mouth daily. aspirin 81 mg chewable tablet 8/16/2019 at Unknown time Self Yes Yes   Sig: Take 81 mg by mouth daily. atorvastatin (LIPITOR) 40 mg tablet 8/16/2019 at Unknown time Self No Yes   Sig: Take 1 Tab by mouth nightly. budesonide-formoterol (SYMBICORT) 160-4.5 mcg/actuation HFAA 8/16/2019 at Unknown time Self No Yes   Sig: Take 2 Puffs by inhalation two (2) times a day. bumetanide (BUMEX) 2 mg tablet 8/16/2019 at Unknown time Self No Yes   Sig: Take 1 Tab by mouth daily. cholecalciferol, vitamin d3, (VITAMIN D3) 400 unit cap 8/16/2019 at Unknown time Self Yes Yes   Sig: Take 400 Units by mouth daily.    clonazePAM (KLONOPIN) 0.5 mg tablet 8/16/2019 at Unknown time Self No Yes   Sig: TAKE 1 TABLET EVERY NIGHT. MAX DAILY DOSE OF 0.5MG. conjugated estrogens (PREMARIN) 0.625 mg/gram vaginal cream 2019 at Unknown time Self Yes Yes   Sig: Insert 0.5 g into vagina daily as needed.  and     dabigatran etexilate (PRADAXA) 150 mg capsule 2019 at Unknown time Self No Yes   Sig: Take 1 Cap by mouth two (2) times a day. IF NO BLEEDING AT CATH SITE. diphenhydrAMINE (BENADRYL) 25 mg capsule 2019 at Unknown time Self Yes Yes   Sig: Take 25 mg by mouth two (2) times a day. lidocaine (ASPERCREME, LIDOCAINE,) 4 % topical cream 2019 at Unknown time Self Yes Yes   Sig: Apply  to affected area daily as needed for Pain (Knee pain). lisinopril (PRINIVIL, ZESTRIL) 40 mg tablet 2019 at Unknown time Self Yes Yes   Sig: Take 40 mg by mouth daily. metoprolol succinate (TOPROL-XL) 100 mg tablet 2019 at Unknown time Self No Yes   Sig: Take 1 Tab by mouth daily. nystatin (MYCOSTATIN) topical cream 2019 at Unknown time Self Yes Yes   Sig: Apply  to affected area two (2) times daily as needed for Skin Irritation (Rash). omeprazole (PRILOSEC) 40 mg capsule 2019 at Unknown time Self No Yes   Sig: Take 1 Cap by mouth daily. potassium chloride SR (KLOR-CON 10) 10 mEq tablet 2019 at Unknown time Self No Yes   Sig: TAKE 1 TABLET EVERY DAY   pregabalin (LYRICA) 200 mg capsule 2019 at Unknown time Self Yes Yes   Sig: Take 200 mg by mouth two (2) times a day. sodium chloride (AYR SALINE) 0.65 % nasal squeeze bottle 2019 at Unknown time Self Yes Yes   Si Sprays by Both Nostrils route every eight (8) hours as needed. traMADol (ULTRAM) 50 mg tablet 2019 at Unknown time Self Yes Yes   Sig: Take 50 mg by mouth daily as needed for Pain (Used to supplement the Lyrica when lyrica doesnt manage the pain on its own).    venlafaxine-SR (EFFEXOR XR) 150 mg capsule 2019 at Unknown time Self Yes Yes   Sig: Take 150 mg by mouth two (2) times daily (with meals). Facility-Administered Medications: None          Thank you,  Adrianna Houser  PharmD.  Candidate, Class of 2021

## 2019-08-17 NOTE — H&P
Hospitalist Admission Note    NAME: Daisha Godoy   :  1950   MRN:  [id number]     Date/Time:  2019 1:04 PM    Patient PCP: Jesi Daniel MD  ________________________________________________________________________    My assessment of this patient's clinical condition and my plan of care is as follows. Assessment / Plan:  Acute hypoxic respiratory failure due to  Acute asthma exacerbation  Acute systolic congestive heart failure exacerbation  -She desaturated to 88% on room air and is using accessory muscles  -Clinically she has diffuse a wheezing consistent with acute asthma exacerbation  -She also has shortness of breath along with elevated BN peptide and chest x-ray evidence of interstitial edema on her last known ejection fraction was 20%  -Start Solu-Medrol, DuoNeb and azithromycin.  Continue oxygen by nasal cannula  -Start Bumex 1 mg  IV twice daily.  Continue home lisinopril and metoprolol  -Strict I's and O's, daily weights and low-salt diet.  Consult cardiology  -Check troponins x2  -check 2D ECHO    History of CKD stage III  Baseline creatinine appears to be around 1.3-1.4 and currently creatinine is around the same range  -Monitor creatinine daily while on Lasix and lisinopril    History of atrial fibrillation with controlled rate  Hypertension  Dyslipidemia  Depression  -Continue home metoprolol and Pradaxa  -Continue home lisinopril and amlodipine  -Continue home atorvastatin  -Continue home Effexor    Hypokalemia  -Replaced with KCl and recheck in a.m. Code Status: full  Surrogate Decision Maker:  Jesus    DVT Prophylaxis: pradaxa      Baseline: From home independent        Subjective:   CHIEF COMPLAINT: Sob    HISTORY OF PRESENT ILLNESS:     This is a 16-year-old female with past medical history of asthma, systolic congestive heart failure exacerbation is coming to the hospital with chief complaints of shortness of breath and also wheezing.  Patient reports being in her usual state of health until about 2 days ago when she started not feeling well.  She reports feeling more short of breath and also wheezing.  She reports using her inhalers with only minimal relief of her symptoms.  She reports mild cough but no phlegm.  She also reports generalized weakness but no focal weakness.  She does not report any chest pressure, palpitations or syncopal episodes.  Denies fever or chills.  Denies abdominal pain, nausea or vomiting.  She also reports mild increased swelling of the legs. On arrival to the hospital she was noted to have uncontrolled hypertension and also desaturated to 80% on room air.  On lab work she was noted to have a normal CBC.  BMP showed a potassium of 3.1 and creatinine 1.45.  She had a chest x-ray which showed evidence of cardiomegaly with interstitial edema. We were asked to admit for work up and evaluation of the above problems.      Past Medical History:   Diagnosis Date    Anxiety 1/22/2018    Arthritis     OA    Asthma     Diabetes ([address]     Essential hypertension     GERD (gastroesophageal reflux disease)     Hypercholesterolemia 1/22/2018    Hypertension     Ill-defined condition     diverticulitis    Long-term use of high-risk medication 1/22/2018    Neuropathy     Obesity (BMI 30-39.9) 4/30/2019    Other ill-defined conditions(799.89)     IBS, spinal stenosis    Plantar fasciitis     Psychiatric disorder     depression, anxiety    Sleep apnea     uses CPAP    Type 2 diabetes mellitus with diabetic neuropathy, without long-term current use of insulin ([address] 6/5/2016        Past Surgical History:   Procedure Laterality Date    CARDIAC SURG PROCEDURE UNLIST      stent    COLONOSCOPY N/A 3/14/2018    COLONOSCOPY performed by Sienna Reynoso MD at [facility] ENDOSCOPY    HX APPENDECTOMY      HX CHOLECYSTECTOMY  11/15/2018    HX HYSTERECTOMY      HX PACEMAKER         Social History     Tobacco Use    Smoking status: Never Smoker    Smokeless tobacco: Never Used   Substance Use Topics    Alcohol use: No        Family History   Problem Relation Age of Onset   [facility] Arthritis-osteo Mother     Hypertension Mother     High Cholesterol Mother    [facility] Crohn's Disease Mother     Heart Disease Mother     Alcohol abuse Father     High Cholesterol Sister     Hypertension Sister     Thyroid Disease Sister     COPD Sister     High Cholesterol Brother     Hypertension Brother     COPD Brother     COPD Child     Inflammatory Bowel Dz Child      Allergies   Allergen Reactions    Sulfa (Sulfonamide Antibiotics) Swelling    Amoxicillin Swelling        Prior to Admission medications    Medication Sig Start Date End Date Taking? Authorizing Provider   predniSONE (DELTASONE) 20 mg tablet Take 40 mg by mouth daily for 5 days. With Breakfast 8/17/19 8/22/19 Yes Chico Hatfield MD   budesonide-formoterol [facility]) 160-4.5 mcg/actuation HFAA Take 2 Puffs by inhalation two (2) times a day. 6/27/19  Yes Sheri Wallace MD   clonazePAM (KLONOPIN) 0.5 mg tablet TAKE 1 TABLET EVERY NIGHT. MAX DAILY DOSE OF 0.5MG. 5/28/19  Yes Sheri Wallace MD   potassium chloride SR (KLOR-CON 10) 10 mEq tablet TAKE 1 TABLET EVERY DAY 5/14/19  Yes Sheri Wallace MD   lisinopril (PRINIVIL, ZESTRIL) 40 mg tablet Take 40 mg by mouth daily. Yes Provider, Historical   diphenhydrAMINE (BENADRYL) 25 mg capsule Take 25 mg by mouth two (2) times a day. Yes Provider, Historical   nystatin (MYCOSTATIN) topical cream Apply  to affected area two (2) times daily as needed for Skin Irritation (Rash). Yes Provider, Historical   traMADol (ULTRAM) 50 mg tablet Take 50 mg by mouth daily as needed for Pain (Used to supplement the Lyrica when lyrica doesnt manage the pain on its own). Yes Provider, Historical   dabigatran etexilate (PRADAXA) 150 mg capsule Take 1 Cap by mouth two (2) times a day.  IF NO BLEEDING AT CATH SITE. 2/24/19  Yes Brendan Palomares MD   bumetanide (BUMEX) 2 mg tablet Take 1 Tab by mouth daily. 11/20/18  Yes Charles Addison MD   amLODIPine (NORVASC) 2.5 mg tablet Take 1 Tab by mouth daily. 11/20/18  Yes Charles Addison MD   omeprazole (PRILOSEC) 40 mg capsule Take 1 Cap by mouth daily. 11/20/18  Yes Charles Addison MD   metoprolol succinate (TOPROL-XL) 100 mg tablet Take 1 Tab by mouth daily. 11/19/18  Yes Mary Hubbard MD   acetaminophen (TYLENOL EXTRA STRENGTH) 500 mg tablet Take 1,000 mg by mouth every eight (8) hours as needed for Pain (Headache). Yes Provider, Historical   lidocaine (ASPERCREME, LIDOCAINE,) 4 % topical cream Apply  to affected area daily as needed for Pain (Knee pain). Yes Provider, Historical   sodium chloride (AYR SALINE) 0.65 % nasal squeeze bottle 2 Sprays by Both Nostrils route every eight (8) hours as needed. Yes Provider, Historical   conjugated estrogens (PREMARIN) 0.625 mg/gram vaginal cream Insert 0.5 g into vagina daily as needed. Tuesdays and Thursdays    Yes Provider, Historical   venlafaxine-SR (EFFEXOR XR) 150 mg capsule Take 150 mg by mouth two (2) times daily (with meals). Yes Provider, Historical   pregabalin (LYRICA) 200 mg capsule Take 200 mg by mouth two (2) times a day. Yes Other, MD Ernie   cholecalciferol, vitamin d3, (VITAMIN D3) 400 unit cap Take 400 Units by mouth daily. Yes Other, MD Ernie   aspirin 81 mg chewable tablet Take 81 mg by mouth daily. Yes Provider, Historical   atorvastatin (LIPITOR) 40 mg tablet Take 1 Tab by mouth nightly. 4/9/14  Yes Brendan Palomares MD   albuterol (PROVENTIL HFA, VENTOLIN HFA, PROAIR HFA) 90 mcg/actuation inhaler Take 1 Puff by inhalation every four (4) hours as needed for Wheezing. 8/17/19   Maciel Bush MD       REVIEW OF SYSTEMS:     I am not able to complete the review of systems because:    The patient is intubated and sedated    The patient has altered mental status due to his acute medical problems    The patient has baseline aphasia from prior stroke(s)    The patient has baseline dementia and is not reliable historian    The patient is in acute medical distress and unable to provide information           Total of 12 systems reviewed as follows:       POSITIVE= underlined text  Negative = text not underlined  General:  fever, chills, sweats, generalized weakness, weight loss/gain,      loss of appetite   Eyes:    blurred vision, eye pain, loss of vision, double vision  ENT:    rhinorrhea, pharyngitis   Respiratory:   cough, sputum production, SOB, STEEN, wheezing, pleuritic pain   Cardiology:   chest pain, palpitations, orthopnea, PND, edema, syncope   Gastrointestinal:  abdominal pain , N/V, diarrhea, dysphagia, constipation, bleeding   Genitourinary:  frequency, urgency, dysuria, hematuria, incontinence   Muskuloskeletal :  arthralgia, myalgia, back pain  Hematology:  easy bruising, nose or gum bleeding, lymphadenopathy   Dermatological: rash, ulceration, pruritis, color change / jaundice  Endocrine:   hot flashes or polydipsia   Neurological:  headache, dizziness, confusion, focal weakness, paresthesia,     Speech difficulties, memory loss, gait difficulty  Psychological: Feelings of anxiety, depression, agitation    Objective:   VITALS:    Visit Vitals  BP (!) 127/92   Pulse 80   Temp 98.1 °F (36.7 °C)   Resp 19   Wt 114.9 kg (253 lb 4.9 oz)   SpO2 94%   BMI 37.41 kg/m²       PHYSICAL EXAM:    General:    Alert, cooperative, no distress, appears stated age. HEENT: Atraumatic, anicteric sclerae, pink conjunctivae     No oral ulcers, mucosa moist  Neck:  Supple, symmetrical,  thyroid: non tender  Lungs:   Air entry diminished, wheezing +, no crackles +   Chest wall:  No tenderness  No Accessory muscle use. Heart:   Regular  rhythm,  No  murmur   No edema  Abdomen:   Soft, non-tender. Not distended. Bowel sounds normal  Extremities: No cyanosis.   No clubbing,      Skin turgor normal, Capillary refill normal, Radial dial pulse 2+  Skin:     Not pale. Not Jaundiced  No rashes   Psych:  Not anxious or agitated. Neurologic: EOMs intact. No facial asymmetry. No aphasia or slurred speech. Symmetrical strength, Sensation grossly intact. Alert and oriented X 4.     _______________________________________________________________________  Care Plan discussed with:    Comments   Patient y    Family      RN y    Care Manager                    Consultant:      _______________________________________________________________________  Expected  Disposition:   Home with Family y   HH/PT/OT/RN    SNF/LTC    LISA    ________________________________________________________________________  TOTAL TIME:  61  Minutes    Critical Care Provided     Minutes non procedure based      Comments    y Reviewed previous records   >50% of visit spent in counseling and coordination of care y Discussion with patient and/or family and questions answered       ________________________________________________________________________  Signed: Boo Navarro MD    Procedures: see electronic medical records for all procedures/Xrays and details which were not copied into this note but were reviewed prior to creation of Plan.     LAB DATA REVIEWED:    Recent Results (from the past 24 hour(s))   EKG, 12 LEAD, INITIAL    Collection Time: 08/17/19  7:49 AM   Result Value Ref Range    Ventricular Rate 80 BPM    Atrial Rate 91 BPM    QRS Duration 134 ms    Q-T Interval 452 ms    QTC Calculation (Bezet) 521 ms    Calculated R Axis -94 degrees    Calculated T Axis 52 degrees    Diagnosis         Ventricular-paced rhythm  When compared with ECG of 27-APR-2019 09:48,  No significant change was found  Confirmed by Madhav Lisa ([id number]) on 8/17/2019 9:43:13 AM     CBC WITH AUTOMATED DIFF    Collection Time: 08/17/19  8:14 AM   Result Value Ref Range    WBC 8.7 3.6 - 11.0 K/uL    RBC 4.22 3.80 - 5.20 M/uL    HGB 11.7 11.5 - 16.0 g/dL    HCT 38.1 35.0 - 47.0 %    MCV 90.3 80.0 - 99.0 FL    MCH 27.7 26.0 - 34.0 PG    MCHC 30.7 30.0 - 36.5 g/dL    RDW 17.8 (H) 11.5 - 14.5 %    PLATELET 130 617 - 036 K/uL    MPV 11.9 8.9 - 12.9 FL    NRBC 0.2 (H) 0  WBC    ABSOLUTE NRBC 0.02 (H) 0.00 - 0.01 K/uL    NEUTROPHILS 74 32 - 75 %    LYMPHOCYTES 15 12 - 49 %    MONOCYTES 7 5 - 13 %    EOSINOPHILS 3 0 - 7 %    BASOPHILS 0 0 - 1 %    IMMATURE GRANULOCYTES 1 (H) 0.0 - 0.5 %    ABS. NEUTROPHILS 6.4 1.8 - 8.0 K/UL    ABS. LYMPHOCYTES 1.4 0.8 - 3.5 K/UL    ABS. MONOCYTES 0.6 0.0 - 1.0 K/UL    ABS. EOSINOPHILS 0.3 0.0 - 0.4 K/UL    ABS. BASOPHILS 0.0 0.0 - 0.1 K/UL    ABS. IMM. GRANS. 0.1 (H) 0.00 - 0.04 K/UL    DF AUTOMATED     METABOLIC PANEL, BASIC    Collection Time: 08/17/19  8:14 AM   Result Value Ref Range    Sodium 143 136 - 145 mmol/L    Potassium 3.1 (L) 3.5 - 5.1 mmol/L    Chloride 106 97 - 108 mmol/L    CO2 30 21 - 32 mmol/L    Anion gap 7 5 - 15 mmol/L    Glucose 163 (H) 65 - 100 mg/dL    BUN 21 (H) 6 - 20 MG/DL    Creatinine 1.45 (H) 0.55 - 1.02 MG/DL    BUN/Creatinine ratio 14 12 - 20      GFR est AA 44 (L) >60 ml/min/1.73m2    GFR est non-AA 36 (L) >60 ml/min/1.73m2    Calcium 8.4 (L) 8.5 - 10.1 MG/DL   NT-PRO BNP    Collection Time: 08/17/19  8:14 AM   Result Value Ref Range    NT pro-BNP 5,632 (H) <125 PG/ML   TROPONIN I    Collection Time: 08/17/19  8:14 AM   Result Value Ref Range    Troponin-I, Qt. 0.06 (H) <0.05 ng/mL   POC VENOUS BLOOD GAS    Collection Time: 08/17/19  8:22 AM   Result Value Ref Range    Device: NASAL CANNULA      Flow rate (POC) 3 L/M    pH, venous (POC) 7.408 7.32 - 7.42      pCO2, venous (POC) 43.9 41 - 51 MMHG    pO2, venous (POC) 39 25 - 40 mmHg    HCO3, venous (POC) 27.7 23.0 - 28.0 MMOL/L    sO2, venous (POC) 73 65 - 88 %    Base excess, venous (POC) 3 mmol/L    Allens test (POC) N/A      Total resp.  rate 31      Site OTHER      Specimen type (POC) VENOUS BLOOD

## 2019-08-17 NOTE — PROGRESS NOTES
Problem: Falls - Risk of  Goal: *Absence of Falls  Description  Document Elisa Goldberg Fall Risk and appropriate interventions in the flowsheet. Outcome: Progressing Towards Goal  Note:   Fall Risk Interventions:  Mobility Interventions: Bed/chair exit alarm         Medication Interventions: Bed/chair exit alarm    Elimination Interventions: Bed/chair exit alarm, Call light in reach              Problem: Patient Education: Go to Patient Education Activity  Goal: Patient/Family Education  Outcome: Progressing Towards Goal     Problem: Risk for Spread of Infection  Goal: Prevent transmission of infectious organism to others  Description  Prevent the transmission of infectious organisms to other patients, staff members, and visitors. Outcome: Progressing Towards Goal     Problem: Patient Education:  Go to Education Activity  Goal: Patient/Family Education  Outcome: Progressing Towards Goal     Problem: Pressure Injury - Risk of  Goal: *Prevention of pressure injury  Description  Document Preet Scale and appropriate interventions in the flowsheet.   Outcome: Progressing Towards Goal  Note:   Pressure Injury Interventions:  Sensory Interventions: Assess changes in LOC    Moisture Interventions: Absorbent underpads    Activity Interventions: Increase time out of bed    Mobility Interventions: Chair cushion    Nutrition Interventions: Document food/fluid/supplement intake    Friction and Shear Interventions: Minimize layers                Problem: Patient Education: Go to Patient Education Activity  Goal: Patient/Family Education  Outcome: Progressing Towards Goal     Problem: Breathing Pattern - Ineffective  Goal: *Absence of hypoxia  Outcome: Progressing Towards Goal  Goal: *Use of effective breathing techniques  Outcome: Progressing Towards Goal

## 2019-08-17 NOTE — PROGRESS NOTES
Reason for Readmission:     Pt to ED with CC SOB, wheezing          RRAT Score and Risk Level:   35              Resources/supports as identified by patient/family:    Supportive spouse and family       Top Challenges facing patient (as identified by patient/family and CM): Finances/Medication cost?     Medicare A/B  [address] [place] [address] 9 [address], Long term/90 day [employer] order. Transportation      Spouse transports to all appointments and will transport at [place]. Support system or lack thereof? Supportive family    Living arrangements? Pt lives with spouse in a 1 story home with 2 SHAQ/Rails       Self-care/ADLs/Cognition? Pt is alert and oriented x 4. Pt states at baseline she is independent of ADL's, pt does not drive. Pt states she uses a cane for ambulation. Pt states she does have a rolling walker, rollator, shower chair, bed side commode and CPAP in the home for DME. Pt states she does not use Home oxygen at this time. Pt has used [place] services in the past and stated she is not interested in [place] services at this time. Pt states she has utilized SNF in the past - [facility] and rehab. Current Advanced Directive/Advance Care Plan: On File           Plan for utilizing home health:   Not interested at this time             Transition of Care Plan:    Based on readmission, the patient's previous Plan of Care has been evaluated and/or modified. The current Transition of Care Plan is:       CM visited pt in room, introduced self and role. Pt verbalized understanding. CM verified demographic information with pt at bedside.      CM updated NN for PCP on admission via staff message    VISHNU PLAN  1) Disposition Home with family assistance  2) Spouse to transport at [place]  3) Pt would benefit from PCP Apt at [place]  4) Pt would benefit from [facility] [employer] [facility]  5) Pt would benefit from [address] at [address] [place] [address] Management Interventions  PCP Verified by CM: Yes  Last Visit to PCP: 06/20/19  Mode of Transport at Discharge: Other (see comment)(spouse to transport at [place])  Transition of Care Consult (CM Consult): Discharge Planning  Discharge Durable Medical Equipment: No(Cane, RW, Rollator, SC, BSC, CPAP)  Physical Therapy Consult: No  Occupational Therapy Consult: No  Speech Therapy Consult: No  Current Support Network: Lives with Spouse, Own Home, Family Lives Nearby  Confirm Follow Up Transport: Family  Plan discussed with Pt/Family/Caregiver: Yes(CM spoke to pt at DC)  Discharge Location  Discharge Placement: Home with family assistance    CM to remain available for support as needed    Cheryle Rilee.  RN, BSN  [facility]  [phone]

## 2019-08-17 NOTE — PROGRESS NOTES
1245: TRANSFER - IN REPORT:    Verbal report received from [address] RN (name) on Cisco Jiang  being received from ED (unit) for routine progression of care      Report consisted of patients Situation, Background, Assessment and   Recommendations(SBAR). Information from the following report(s) SBAR, Kardex and ED Summary was reviewed with the receiving nurse. Opportunity for questions and clarification was provided. Assessment completed upon patients arrival to unit and care assumed. 1645: Paged Dr. Minh Baig, Dr. Trish Gonzalez respond, pt's BG is 414, above SS. Orders given for 10 units of insulin lispro.    1900:   Bedside shift change report GIVEN TO Mikayla Durand RN. Report included the following information SBAR and Kardex. SIGNIFICANT CHANGES DURING SHIFT:  Pt admitted from ED. Pt in good spirits. CONCERNS TO ADDRESS WITH MD:  D/c planning? [facility] NURSING NOTE   Admission Date 8/17/2019   Admission Diagnosis Acute asthma exacerbation [J45.901]   Consults IP CONSULT TO HOSPITALIST  IP CONSULT TO CARDIOLOGY      Cardiac Monitoring [x] Yes [] No      Purposeful Hourly Rounding [x] Yes    Ananya Score Total Score: 3   Ananya score 3 or > [x] Bed Alarm [] Avasys [] 1:1 sitter [] Patient refused (Signed refusal form in chart)   Preet Score Preet Score: 17   Preet score 14 or < [] PMT consult [] Wound Care consult    []  Specialty bed  [] Nutrition consult      Influenza Vaccine Received Flu Vaccine for Current Season (usually Sept-March): Not Flu Season           Oxygen needs? [] Room air Oxygen @  []1L    [x]2L    []3L   []4L    []5L   []6L via  NC   Chronic home O2 use?  [] Yes [x] No  Perform O2 challenge test and document in progress note using smartphrase (.Homeoxygen)      Last bowel movement        Urinary Catheter             LDAs               Peripheral IV 08/17/19 Right Antecubital (Active)   Site Assessment Clean, dry, & intact 8/17/2019  3:29 PM   Phlebitis Assessment 0 8/17/2019 3:29 PM   Infiltration Assessment 0 8/17/2019  3:29 PM   Dressing Status Clean, dry, & intact 8/17/2019  3:29 PM   Dressing Type Transparent;Tape 8/17/2019  3:29 PM                         Readmission Risk Assessment Tool Score High Risk            35       Total Score        3 Has Seen PCP in Last 6 Months (Yes=3, No=0)    2 . Living with Significant Other. Assisted Living. LTAC. SNF. or   Rehab    4 IP Visits Last 12 Months (1-3=4, 4=9, >4=11)    5 Pt.  Coverage (Medicare=5 , Medicaid, or Self-Pay=4)    21 Charlson Comorbidity Score (Age + Comorbid Conditions)        Criteria that do not apply:    Patient Length of Stay (>5 days = 3)       Expected Length of Stay - - -   Actual Length of Stay 0

## 2019-08-17 NOTE — ED NOTES
TRANSFER - OUT REPORT:    Verbal report given to Lakeshia Ibrahim RN (name) on Galo Wilson  being transferred to [facility] (unit) for routine progression of care       Report consisted of patients Situation, Background, Assessment and   Recommendations(SBAR). Information from the following report(s) SBAR, ED Summary, [facility] and Recent Results was reviewed with the receiving nurse. Lines:   Peripheral IV 08/17/19 Right Antecubital (Active)   Site Assessment Clean, dry, & intact 8/17/2019  7:55 AM   Phlebitis Assessment 0 8/17/2019  7:55 AM   Infiltration Assessment 0 8/17/2019  7:55 AM   Dressing Status Clean, dry, & intact 8/17/2019  7:55 AM   Dressing Type Transparent 8/17/2019  7:55 AM        Opportunity for questions and clarification was provided.       Patient transported with:   O2 @ 2 liters

## 2019-08-17 NOTE — ED NOTES
Pt to ED with c/o SOB that started yesterday. Pt states she also had some chest tightness yesterday but denies any currently. Pt audibly wheezing with some coarse breath sounds. Pt has a history of asthma and used inhaler at home but denies having any nebulizer's at home. Pt also hx of CHF and on bumex, with 2+ edema in lower extremities. Pt arrives via EMS on duo neb. After duoneb 88% on RA with labored breathing. Pt becomes extremely tachypenic with any exertion or movement. Pt placed on cardiac monitor in paced rhythm, SPO2 monitor and BP monitor. EKG obtained.

## 2019-08-17 NOTE — PROGRESS NOTES
ADULT PROTOCOL: JET AEROSOL ASSESSMENT    Patient  Melany Gardner     79 y.o.   female     8/17/2019  5:58 PM    Breath Sounds Pre Procedure:                                      Breath Sounds Post Procedure:                                        Breathing pattern: Pre procedure Breathing Pattern: Regular          Post procedure      Heart Rate: Pre procedure Pulse: 80           Post procedure      Resp Rate: Pre procedure Respirations: 18                   Oxygen: O2 Device: Nasal cannula   2.5L    SpO2: Pre procedure SpO2: 95 %                 Post procedure        Nebulizer Therapy: Current medications Aerosolized Medications: DuoNeb          Smoking History: Never    Problem List:   Patient Active Problem List   Diagnosis Code    Unspecified hereditary and idiopathic peripheral neuropathy G60.9    HBP (high blood pressure) I10    Spinal stenosis, lumbar region, without neurogenic claudication M48.061    Essential and other specified forms of tremor G25.0, G25.2    IBS (irritable bowel syndrome) K58.9    Depression F32.9    Migraine with aura, without mention of intractable migraine without mention of status migrainosus G43. 109    Right knee DJD M17.11    B12 deficiency E53.8    Ataxia R27.0    Foot pain, right M79.671    Fever R50.9    UTI (urinary tract infection) N39.0    Lower abdominal pain R10.30    Type 2 diabetes mellitus with diabetic neuropathy, without long-term current use of insulin (HCC) E11.40    Chronic atrial fibrillation (HCC) K61.6    Systolic CHF, chronic (HCC) I50.22    Cellulitis of left lower leg L03. 116    Anxiety F41.9    Hypercholesterolemia E78.00    Long-term use of high-risk medication Z79.899    Hypokalemia E87.6    Ischemic cardiomyopathy I25.5    CAD (coronary artery disease) I25.10    Epigastric abdominal pain R10.13    S/P placement of cardiac pacemaker Z95.0    Stage 3 chronic kidney disease (HCC) N18.3    Chronic cholecystitis K81.1    Asthma J45.909    Obesity (BMI 30-39. 9) E66.9    Acute asthma exacerbation J45. Viru 65       Respiratory Therapist: Rosario Londono

## 2019-08-17 NOTE — CONSULTS
Pt seen and examined. Full consult dictated. Agree with current Rx. Will review echo. Will f/u Sunday.

## 2019-08-17 NOTE — PROGRESS NOTES
Pharmacy Automatic Direct Oral Anticoagulant Renal Dosing Protocol    Patient currently receiving Dabigatran 150 mg bid with an indication of Atrial Fibrillation     Start date: continue from home    Major interacting medications: none significant      Platelet inhibitors (dose/frequency): ASA 81mg/day    Labs:  Recent Labs     08/17/19  0814   CREA 1.45*   HGB 11.7        Wt Readings from Last 1 Encounters:   08/17/19 114.9 kg (253 lb 4.9 oz)        Creatinine Clearance: 69 ml/min (using ABW)    Impression/Plan:   - Resume Dabigatran 150mg po BID  - BMP & CBC w/o diff every 3 days per protocol     Pharmacy will follow daily and adjust as appropriate. Thank you,  Serafin Stevenson RP     [email or web address]/[facility]/Pharmacy/Clinical%20Companion/[email or web address]. pdf

## 2019-08-18 LAB
ANION GAP SERPL CALC-SCNC: 8 MMOL/L (ref 5–15)
BASOPHILS # BLD: 0 K/UL (ref 0–0.1)
BASOPHILS NFR BLD: 0 % (ref 0–1)
BUN SERPL-MCNC: 25 MG/DL (ref 6–20)
BUN/CREAT SERPL: 16 (ref 12–20)
CALCIUM SERPL-MCNC: 7.8 MG/DL (ref 8.5–10.1)
CHLORIDE SERPL-SCNC: 108 MMOL/L (ref 97–108)
CO2 SERPL-SCNC: 26 MMOL/L (ref 21–32)
CREAT SERPL-MCNC: 1.53 MG/DL (ref 0.55–1.02)
DIFFERENTIAL METHOD BLD: ABNORMAL
EOSINOPHIL # BLD: 0 K/UL (ref 0–0.4)
EOSINOPHIL NFR BLD: 0 % (ref 0–7)
ERYTHROCYTE [DISTWIDTH] IN BLOOD BY AUTOMATED COUNT: 17.5 % (ref 11.5–14.5)
GLUCOSE BLD STRIP.AUTO-MCNC: 232 MG/DL (ref 65–100)
GLUCOSE BLD STRIP.AUTO-MCNC: 261 MG/DL (ref 65–100)
GLUCOSE BLD STRIP.AUTO-MCNC: 308 MG/DL (ref 65–100)
GLUCOSE BLD STRIP.AUTO-MCNC: 313 MG/DL (ref 65–100)
GLUCOSE SERPL-MCNC: 288 MG/DL (ref 65–100)
HCT VFR BLD AUTO: 35.7 % (ref 35–47)
HGB BLD-MCNC: 10.6 G/DL (ref 11.5–16)
IMM GRANULOCYTES # BLD AUTO: 0.1 K/UL (ref 0–0.04)
IMM GRANULOCYTES NFR BLD AUTO: 1 % (ref 0–0.5)
LYMPHOCYTES # BLD: 0.5 K/UL (ref 0.8–3.5)
LYMPHOCYTES NFR BLD: 5 % (ref 12–49)
MCH RBC QN AUTO: 27 PG (ref 26–34)
MCHC RBC AUTO-ENTMCNC: 29.7 G/DL (ref 30–36.5)
MCV RBC AUTO: 91.1 FL (ref 80–99)
MONOCYTES # BLD: 0.3 K/UL (ref 0–1)
MONOCYTES NFR BLD: 3 % (ref 5–13)
NEUTS SEG # BLD: 8.1 K/UL (ref 1.8–8)
NEUTS SEG NFR BLD: 91 % (ref 32–75)
NRBC # BLD: 0 K/UL (ref 0–0.01)
NRBC BLD-RTO: 0 PER 100 WBC
PLATELET # BLD AUTO: 173 K/UL (ref 150–400)
PMV BLD AUTO: 12.3 FL (ref 8.9–12.9)
POTASSIUM SERPL-SCNC: 3.6 MMOL/L (ref 3.5–5.1)
RBC # BLD AUTO: 3.92 M/UL (ref 3.8–5.2)
SERVICE CMNT-IMP: ABNORMAL
SODIUM SERPL-SCNC: 142 MMOL/L (ref 136–145)
TROPONIN I SERPL-MCNC: <0.05 NG/ML
WBC # BLD AUTO: 8.9 K/UL (ref 3.6–11)

## 2019-08-18 PROCEDURE — 74011000250 HC RX REV CODE- 250: Performed by: HOSPITALIST

## 2019-08-18 PROCEDURE — 65660000000 HC RM CCU STEPDOWN

## 2019-08-18 PROCEDURE — 94760 N-INVAS EAR/PLS OXIMETRY 1: CPT

## 2019-08-18 PROCEDURE — 74011250637 HC RX REV CODE- 250/637: Performed by: INTERNAL MEDICINE

## 2019-08-18 PROCEDURE — 74011000250 HC RX REV CODE- 250: Performed by: INTERNAL MEDICINE

## 2019-08-18 PROCEDURE — 82962 GLUCOSE BLOOD TEST: CPT

## 2019-08-18 PROCEDURE — 85025 COMPLETE CBC W/AUTO DIFF WBC: CPT

## 2019-08-18 PROCEDURE — 84484 ASSAY OF TROPONIN QUANT: CPT

## 2019-08-18 PROCEDURE — 74011250636 HC RX REV CODE- 250/636: Performed by: INTERNAL MEDICINE

## 2019-08-18 PROCEDURE — 80048 BASIC METABOLIC PNL TOTAL CA: CPT

## 2019-08-18 PROCEDURE — 74011636637 HC RX REV CODE- 636/637: Performed by: FAMILY MEDICINE

## 2019-08-18 PROCEDURE — 36415 COLL VENOUS BLD VENIPUNCTURE: CPT

## 2019-08-18 PROCEDURE — 94640 AIRWAY INHALATION TREATMENT: CPT

## 2019-08-18 PROCEDURE — 74011250637 HC RX REV CODE- 250/637: Performed by: HOSPITALIST

## 2019-08-18 PROCEDURE — 77010033678 HC OXYGEN DAILY

## 2019-08-18 RX ORDER — PREDNISONE 20 MG/1
40 TABLET ORAL
Status: DISCONTINUED | OUTPATIENT
Start: 2019-08-19 | End: 2019-08-21 | Stop reason: HOSPADM

## 2019-08-18 RX ORDER — NYSTATIN 100000 [USP'U]/G
POWDER TOPICAL 2 TIMES DAILY
Status: DISCONTINUED | OUTPATIENT
Start: 2019-08-18 | End: 2019-08-21 | Stop reason: HOSPADM

## 2019-08-18 RX ADMIN — Medication 10 ML: at 05:33

## 2019-08-18 RX ADMIN — BUMETANIDE 1 MG: 0.25 INJECTION INTRAMUSCULAR; INTRAVENOUS at 08:51

## 2019-08-18 RX ADMIN — INSULIN LISPRO 10 UNITS: 100 INJECTION, SOLUTION INTRAVENOUS; SUBCUTANEOUS at 12:37

## 2019-08-18 RX ADMIN — VENLAFAXINE HYDROCHLORIDE 150 MG: 150 CAPSULE, EXTENDED RELEASE ORAL at 17:13

## 2019-08-18 RX ADMIN — DABIGATRAN ETEXILATE MESYLATE 150 MG: 150 CAPSULE ORAL at 08:51

## 2019-08-18 RX ADMIN — ASPIRIN 81 MG 81 MG: 81 TABLET ORAL at 08:51

## 2019-08-18 RX ADMIN — IPRATROPIUM BROMIDE AND ALBUTEROL SULFATE 3 ML: .5; 3 SOLUTION RESPIRATORY (INHALATION) at 19:52

## 2019-08-18 RX ADMIN — IPRATROPIUM BROMIDE AND ALBUTEROL SULFATE 3 ML: .5; 3 SOLUTION RESPIRATORY (INHALATION) at 15:10

## 2019-08-18 RX ADMIN — NYSTATIN: 100000 POWDER TOPICAL at 12:55

## 2019-08-18 RX ADMIN — IPRATROPIUM BROMIDE AND ALBUTEROL SULFATE 3 ML: .5; 3 SOLUTION RESPIRATORY (INHALATION) at 00:13

## 2019-08-18 RX ADMIN — VENLAFAXINE HYDROCHLORIDE 150 MG: 150 CAPSULE, EXTENDED RELEASE ORAL at 08:51

## 2019-08-18 RX ADMIN — FLUTICASONE FUROATE AND VILANTEROL TRIFENATATE 1 PUFF: 100; 25 POWDER RESPIRATORY (INHALATION) at 09:00

## 2019-08-18 RX ADMIN — IPRATROPIUM BROMIDE AND ALBUTEROL SULFATE 3 ML: .5; 3 SOLUTION RESPIRATORY (INHALATION) at 07:46

## 2019-08-18 RX ADMIN — DABIGATRAN ETEXILATE MESYLATE 150 MG: 150 CAPSULE ORAL at 17:13

## 2019-08-18 RX ADMIN — METHYLPREDNISOLONE SODIUM SUCCINATE 40 MG: 40 INJECTION, POWDER, FOR SOLUTION INTRAMUSCULAR; INTRAVENOUS at 05:33

## 2019-08-18 RX ADMIN — BUMETANIDE 1 MG: 0.25 INJECTION INTRAMUSCULAR; INTRAVENOUS at 17:13

## 2019-08-18 RX ADMIN — INSULIN LISPRO 10 UNITS: 100 INJECTION, SOLUTION INTRAVENOUS; SUBCUTANEOUS at 08:51

## 2019-08-18 RX ADMIN — IPRATROPIUM BROMIDE AND ALBUTEROL SULFATE 3 ML: .5; 3 SOLUTION RESPIRATORY (INHALATION) at 11:33

## 2019-08-18 RX ADMIN — LISINOPRIL 40 MG: 20 TABLET ORAL at 08:51

## 2019-08-18 RX ADMIN — INSULIN LISPRO 7 UNITS: 100 INJECTION, SOLUTION INTRAVENOUS; SUBCUTANEOUS at 17:13

## 2019-08-18 RX ADMIN — Medication 10 ML: at 22:23

## 2019-08-18 RX ADMIN — METOPROLOL SUCCINATE 100 MG: 50 TABLET, EXTENDED RELEASE ORAL at 08:51

## 2019-08-18 RX ADMIN — ATORVASTATIN CALCIUM 40 MG: 40 TABLET, FILM COATED ORAL at 22:24

## 2019-08-18 RX ADMIN — PREGABALIN 200 MG: 150 CAPSULE ORAL at 17:13

## 2019-08-18 RX ADMIN — Medication 10 ML: at 14:00

## 2019-08-18 RX ADMIN — INSULIN LISPRO 2 UNITS: 100 INJECTION, SOLUTION INTRAVENOUS; SUBCUTANEOUS at 22:23

## 2019-08-18 RX ADMIN — AMLODIPINE BESYLATE 2.5 MG: 5 TABLET ORAL at 08:51

## 2019-08-18 RX ADMIN — PANTOPRAZOLE SODIUM 40 MG: 40 TABLET, DELAYED RELEASE ORAL at 08:51

## 2019-08-18 RX ADMIN — PREGABALIN 200 MG: 150 CAPSULE ORAL at 08:51

## 2019-08-18 RX ADMIN — NYSTATIN: 100000 POWDER TOPICAL at 17:13

## 2019-08-18 NOTE — PROGRESS NOTES
Hospitalist Progress Note    NAME: Galo Wilson   :  1950   MRN:  [id number]       Assessment / Plan:    Acute hypoxic respiratory failure POA sats 88% on RA with resp distress   Likely multifactorial from HF and Asthma exacerbation ( wheezing on admission) Acute on chronic systolic HF: s/p Echo , LVF 21 - 25%  S/p ICD  S/P cardiac cath in 2019 shows mild CAD  NICM  CXR: Cardiomegaly with pacemaker and mild interstitial edema  proBNP 5632  -daily weight ( pt doesn't remember being weighed, unsure if weight from yesterday is correct), will get baseline weight  -strict I and O, on IV bumex BID  -will change IV solu-medrol to prednisone with quick weaning  -cont scheduled and prn duo-neb  -will d/c abx( no fever or wbc)  -wean oxygen down as tolerated  Start Solu-Medrol, DuoNeb and azithromycin.  Continue oxygen by nasal cannula  -Consulted cardiology    Hypokalemia POA  -replaced and wnl    Right sided fungal infection, abdominal fold  -nystatin powder and keep it dry    History of CKD stage III APO  -cr baseline  -monitor lytes and renal function while being diuresis    History of atrial fibrillation with controlled rate POA  Hypertension, accelerated POA, better controlled  -Continue home metoprolol and Pradaxa  -Continue home lisinopril and amlodipine    Dyslipidemia POA  -Continue home atorvastatin    Depression POA  -Continue home Effexor    Obese  Body mass index is 37.41 kg/m².     DVT Prophylaxis: pradaxa  Code Status: full  Surrogate Decision Maker:  Jesus  Baseline: From home independent         Subjective:     Chief Complaint / Reason for Physician Visit  \"I am doing better\". Discussed with RN events overnight.      Review of Systems:  Symptom Y/N Comments  Symptom Y/N Comments   Fever/Chills    Chest Pain     Poor Appetite    Edema     Cough    Abdominal Pain     Sputum    Joint Pain     SOB/STEEN    Pruritis/Rash     Nausea/vomit    Tolerating PT/OT     Diarrhea    Tolerating Diet     Constipation    Other       Could NOT obtain due to:      Objective:     VITALS:   Last 24hrs VS reviewed since prior progress note. Most recent are:  Patient Vitals for the past 24 hrs:   Temp Pulse Resp BP SpO2   08/18/19 0814 97.8 °F (36.6 °C) 80 20 150/88 95 %   08/18/19 0746     95 %   08/18/19 0241 97.8 °F (36.6 °C) 80 20 151/76 97 %   08/18/19 0011     95 %   08/17/19 2354 97.5 °F (36.4 °C) 79  168/81 95 %   08/17/19 1945 98.3 °F (36.8 °C) 79 20 132/90 95 %   08/17/19 1926     97 %   08/17/19 1606    153/90    08/17/19 1543     95 %   08/17/19 1502    151/70    08/17/19 1501 98.3 °F (36.8 °C) 80 20 (!) 180/94 94 %   08/17/19 1326 97.7 °F (36.5 °C) 81 18 153/90 94 %   08/17/19 1215  80 19 (!) 127/92 94 %   08/17/19 1145  80 19 (!) 147/95 94 %   08/17/19 1130  80 19 (!) 149/97 91 %   08/17/19 1126     91 %   08/17/19 1121     (!) 89 %   08/17/19 1115  80 18 (!) 112/98 91 %   08/17/19 1108  81 18 (!) 165/100 99 %   08/17/19 1045  82 19 (!) 140/111 99 %   08/17/19 1030  80 19 124/83 98 %   08/17/19 1015  80 23 112/70 98 %       Intake/Output Summary (Last 24 hours) at 8/18/2019 1013  Last data filed at 8/18/2019 0820  Gross per 24 hour   Intake 720 ml   Output 2150 ml   Net -1430 ml        PHYSICAL EXAM:  General: Morbidly obese. Alert, cooperative, no acute distress    EENT:  EOMI. Anicteric sclerae. MMM  Resp:  CTA bilaterally. No accessory muscle use  CV:  Regular  rhythm,  No edema  GI:  Soft, Non distended, Non tender.  +Bowel sounds, abdominal skin fold candidal infx  Neurologic:  Alert and oriented X 3, normal speech, cn 2-12 grossly intact  Psych:   Good insight. Not anxious nor agitated  Skin:  No rashes.   No jaundice    Reviewed most current lab test results and cultures  YES  Reviewed most current radiology test results   YES  Review and summation of old records today    NO  Reviewed patient's current orders and MAR    YES  PMH/SH reviewed - no change compared to H&P  ________________________________________________________________________  Care Plan discussed with:    Comments   Patient x    Family      RN x    Care Manager     Consultant                        Multidiciplinary team rounds were held today with , nursing, pharmacist and clinical coordinator. Patient's plan of care was discussed; medications were reviewed and discharge planning was addressed. ________________________________________________________________________  Total NON critical care TIME:     Minutes    Total CRITICAL CARE TIME Spent:   Minutes non procedure based      Comments   >50% of visit spent in counseling and coordination of care     ________________________________________________________________________  Ca Patino MD     Procedures: see electronic medical records for all procedures/Xrays and details which were not copied into this note but were reviewed prior to creation of Plan. LABS:  I reviewed today's most current labs and imaging studies.   Pertinent labs include:  Recent Labs     08/18/19 0520 08/17/19 0814   WBC 8.9 8.7   HGB 10.6* 11.7   HCT 35.7 38.1    197     Recent Labs     08/18/19 0520 08/17/19 0814    143   K 3.6 3.1*    106   CO2 26 30   * 163*   BUN 25* 21*   CREA 1.53* 1.45*   CA 7.8* 8.4*       Signed: Ca Patino MD

## 2019-08-18 NOTE — PROGRESS NOTES
Patient's regular pulmonologist is Dr. Shahriar Ortiz.  Asif with [facility]

## 2019-08-18 NOTE — PHYSICIAN ADVISORY
Letter of Status Determination:   Recommend hospitalization status    INPATIENT       Pt Name:  Enriqueta Asencio   MR#   [address] [facility] # [id number] /  [id number]  Payor: Alejandro Briscoe / Plan: Katina Aburto [facility] / Product Type: Medicare /    MERLY#  [id number]   [address] [place] [address]  2214/01  @ [facility]   Hospitalization date  8/17/2019  7:38 AM   Current Attending Physician  Owen Leiv MD   Principal diagnosis  Acute asthma exacerbation [J45.901]     Clinicals  79 y.o. y.o  female hospitalized with above diagnosis   This is a 70-year-old female with past medical history of asthma, systolic congestive heart failure exacerbation is coming to the hospital with chief complaints of shortness of breath and also wheezing.  Patient reports being in her usual state of health until about 2 days ago when she started not feeling well.  She reports feeling more short of breath and also wheezing.  She reports using her inhalers with only minimal relief of her symptoms.  She reports mild cough but no phlegm.  She also reports generalized weakness but no focal weakness.  She does not report any chest pressure, palpitations or syncopal episodes.  Denies fever or chills.  Denies abdominal pain, nausea or vomiting.  She also reports mild increased swelling of the legs. On arrival to the hospital she was noted to have uncontrolled hypertension and also desaturated to 80% on room air.  On lab work she was noted to have a normal CBC.  BMP showed a potassium of 3.1 and creatinine 1.45.  She had a chest x-ray which showed evidence of cardiomegaly with interstitial edema.      Milliman (MCG) criteria   Does  NOT apply O2 saturation 80 percent, Elevated BNP, Congestive heart failure, EF 20, underlying LUNG disease requiring IV steroids   STATUS DETERMINATION         Additional comments     Payor: [place] MED[place]ARE / Plan: [place] MEDICARE PART A & B / Product Type: Medicare /           Radha Juarez MD

## 2019-08-18 NOTE — CONSULTS
[facility]    Name:  Vilma Qureshi  MR#:  [id number]  :  1950  ACCOUNT #:  [de-identified]  DATE OF SERVICE:  2019    REQUESTING PHYSICIAN:  Kaya Snider MD    REASON FOR CONSULTATION:  Evaluate CHF. CHIEF COMPLAINT:  \"Asthma. \"    HISTORY OF PRESENT ILLNESS:  The patient is a 71-year-old female with an extensive cardiac history. The patient has a history of coronary artery disease with prior PTCA and stenting of the LAD in . She also has a history of paroxysmal atrial fibrillation and flutter with atrial flutter ablation in . She has a history of congestive heart failure with most recent EF in 10/2018 of 15%-20% by echo. She had a prior pacemaker placed in . Apparently, she has never had an upgrade to a defibrillator. Prior EF several years ago was greater than 35% apparently. The patient is followed by Dr. Patrizia Young. She presented to the emergency room with shortness of breath and wheezing. Her chest x-ray showed some mild interstitial edema. She has no prior history of asthma and has not been a smoker in the past.  She was placed on antibiotics and diuretics and is feeling a little bit better. There has been no significant rise in cardiac enzymes. I was asked to see the patient for evaluation. PAST MEDICAL HISTORY:  Type 2 diabetes, sleep apnea, chronic renal insufficiency, asthma, spinal stenosis. PAST SURGICAL HISTORY:  Status post prior appendectomy, status post cholecystectomy, status post hysterectomy, status post pacemaker placement. CURRENT MEDICATIONS:  DuoNeb nebulizers, Zithromax, Breo Ellipta inhaler, Solu-Medrol, Bumex 1 mg IV q.12, lisinopril, Norvasc, Toprol XL, aspirin, Pradaxa, Lyrica, Protonix, Lipitor, NPH insulin, Effexor. SOCIAL HISTORY:  The patient does not smoke or abuse alcohol. FAMILY HISTORY:  The patient's mother had hypertension and heart disease. REVIEW OF SYSTEMS:  As noted above.   No fever, no chills. No sore throat. No cough. Positive wheezing. PHYSICAL EXAMINATION:  GENERAL:  Reveals an obese, late middle-aged white female in no acute distress. VITAL SIGNS:  Blood pressure 180/94, pulse 80, respirations 20, temperature 98. 3. HEENT:  Pupils are equal and reactive. Oropharynx shows moist oral mucosa. NECK:  Supple. No masses or thyromegaly. No obvious cervical or supraclavicular adenopathy. No definite carotid bruits or JVD, although exam is limited. CHEST:  Scattered crackles and coarse breath sounds. SKIN:  Pale in appearance. No rashes. CARDIAC:  Regular rate and rhythm. Normal S1 and S2. No gallops. ABDOMEN:  Obese, soft, nontender, no masses or organomegaly. Bowel sounds positive. EXTREMITIES:  No cyanosis or clubbing. 1+ edema of the feet bilaterally. Distal pulses not well palpated. NEURO:  No obvious gross motor deficits. LABORATORY DATA:  Hemoglobin 11.7. BUN 21, creatinine 1.45, troponin 0.06. ProBNP 5632. DIAGNOSTIC DATA:  Chest x-ray showed mild interstitial edema. EKG:  Sinus rhythm, right bundle-branch block, left anterior fascicular block, first-degree AV block, nonspecific ST-T changes. IMPRESSION:  1. Acute respiratory distress due to either asthma, pulmonary edema, or both. 2.  History of cardiomyopathy with ejection fraction 15%-20% by last echocardiogram.  3.  Coronary artery disease with prior percutaneous transluminal coronary angioplasty and stenting of the left anterior descending. 4.  History of paroxysmal atrial fibrillation. 5.  Prior pacemaker placement in 2014. 6.  Type 2 diabetes. 7.  Sleep apnea. 8.  Obesity. 9.  Chronic renal insufficiency. RECOMMENDATIONS:  I agree with IV diuresis and treatment with IV antibiotics. However, I suspect this presentation is primarily due to heart failure and is not respiratory.   I will review the echocardiogram.  If the patient's EF remains low, we need to consider an implantable defibrillator, possibly biventricular device. She does also possibly need evaluation for progression of coronary artery disease. Thank you for this consult. Continue current medical therapy and further recommendations will follow.       Lou Rhodes MD      [id number]  D:  08/17/2019 18:58  T:  08/17/2019 19:09  JOB #:  [id number]  CC:  MD Patricia Perry MD

## 2019-08-18 NOTE — PROGRESS NOTES
Cardiology Progress Note      8/18/2019 2:56 PM    Admit Date: 8/17/2019          Subjective:  Says she feels better. Less SOB. According to pt she does have a pacer/ICD (not clear in old chart) . Echo shows EF 20-25%         Visit Vitals  /85 (BP 1 Location: Right arm, BP Patient Position: At rest)   Pulse 82   Temp 97.8 °F (36.6 °C)   Resp 20   Ht 5' 9\" (1.753 m)   Wt 119.2 kg (262 lb 12.8 oz)   SpO2 95%   BMI 38.81 kg/m²     08/16 1901 - 08/18 0700  In: 720 [P.O.:720]  Out: 1850 [Urine:1850]        Objective:      Physical Exam:  VS as above  Chest no wheezes  Card RRR no gallop     Data Review:   Labs:    Trop neg  Hgb 10.6  BUN 25  Creat 1.5     Telemetry: V pacing, prob atrial tracking      Assessment:     1. Acute respiratory distress due to either asthma, pulmonary edema, or both. 2.  History of cardiomyopathy with ejection fraction about 20% by  Echocardiogram yest   3. Coronary artery disease with prior percutaneous transluminal coronary angioplasty and stenting of the left anterior descending. 4.  History of paroxysmal atrial fibrillation. 5.  Prior pacemaker/ICD  placement   6. Type 2 diabetes. 7.  Sleep apnea. 8.  Obesity. 9.  Chronic renal insufficiency. Plan:  Diuresing.  Cont current Rx

## 2019-08-19 LAB
GLUCOSE BLD STRIP.AUTO-MCNC: 136 MG/DL (ref 65–100)
GLUCOSE BLD STRIP.AUTO-MCNC: 193 MG/DL (ref 65–100)
GLUCOSE BLD STRIP.AUTO-MCNC: 195 MG/DL (ref 65–100)
GLUCOSE BLD STRIP.AUTO-MCNC: 252 MG/DL (ref 65–100)
SERVICE CMNT-IMP: ABNORMAL

## 2019-08-19 PROCEDURE — 74011636637 HC RX REV CODE- 636/637: Performed by: HOSPITALIST

## 2019-08-19 PROCEDURE — 97161 PT EVAL LOW COMPLEX 20 MIN: CPT

## 2019-08-19 PROCEDURE — 74011000250 HC RX REV CODE- 250: Performed by: INTERNAL MEDICINE

## 2019-08-19 PROCEDURE — 82962 GLUCOSE BLOOD TEST: CPT

## 2019-08-19 PROCEDURE — 97535 SELF CARE MNGMENT TRAINING: CPT

## 2019-08-19 PROCEDURE — 65660000000 HC RM CCU STEPDOWN

## 2019-08-19 PROCEDURE — 74011636637 HC RX REV CODE- 636/637: Performed by: FAMILY MEDICINE

## 2019-08-19 PROCEDURE — 94760 N-INVAS EAR/PLS OXIMETRY 1: CPT

## 2019-08-19 PROCEDURE — 94640 AIRWAY INHALATION TREATMENT: CPT

## 2019-08-19 PROCEDURE — 97116 GAIT TRAINING THERAPY: CPT

## 2019-08-19 PROCEDURE — 97165 OT EVAL LOW COMPLEX 30 MIN: CPT

## 2019-08-19 PROCEDURE — 74011250637 HC RX REV CODE- 250/637: Performed by: INTERNAL MEDICINE

## 2019-08-19 RX ORDER — AMLODIPINE BESYLATE 5 MG/1
5 TABLET ORAL DAILY
Status: DISCONTINUED | OUTPATIENT
Start: 2019-08-20 | End: 2019-08-21 | Stop reason: HOSPADM

## 2019-08-19 RX ADMIN — INSULIN LISPRO 3 UNITS: 100 INJECTION, SOLUTION INTRAVENOUS; SUBCUTANEOUS at 11:40

## 2019-08-19 RX ADMIN — ASPIRIN 81 MG 81 MG: 81 TABLET ORAL at 08:53

## 2019-08-19 RX ADMIN — PREGABALIN 200 MG: 150 CAPSULE ORAL at 17:10

## 2019-08-19 RX ADMIN — Medication 10 ML: at 06:00

## 2019-08-19 RX ADMIN — BUMETANIDE 1 MG: 0.25 INJECTION INTRAMUSCULAR; INTRAVENOUS at 16:45

## 2019-08-19 RX ADMIN — IPRATROPIUM BROMIDE AND ALBUTEROL SULFATE 3 ML: .5; 3 SOLUTION RESPIRATORY (INHALATION) at 20:09

## 2019-08-19 RX ADMIN — FLUTICASONE FUROATE AND VILANTEROL TRIFENATATE 1 PUFF: 100; 25 POWDER RESPIRATORY (INHALATION) at 08:53

## 2019-08-19 RX ADMIN — LISINOPRIL 40 MG: 20 TABLET ORAL at 08:53

## 2019-08-19 RX ADMIN — Medication 10 ML: at 21:47

## 2019-08-19 RX ADMIN — IPRATROPIUM BROMIDE AND ALBUTEROL SULFATE 3 ML: .5; 3 SOLUTION RESPIRATORY (INHALATION) at 07:21

## 2019-08-19 RX ADMIN — DABIGATRAN ETEXILATE MESYLATE 150 MG: 150 CAPSULE ORAL at 17:10

## 2019-08-19 RX ADMIN — AMLODIPINE BESYLATE 2.5 MG: 5 TABLET ORAL at 08:52

## 2019-08-19 RX ADMIN — DOCUSATE SODIUM 100 MG: 100 CAPSULE, LIQUID FILLED ORAL at 17:10

## 2019-08-19 RX ADMIN — PREGABALIN 200 MG: 150 CAPSULE ORAL at 08:52

## 2019-08-19 RX ADMIN — METOPROLOL SUCCINATE 100 MG: 50 TABLET, EXTENDED RELEASE ORAL at 08:53

## 2019-08-19 RX ADMIN — Medication 10 ML: at 13:31

## 2019-08-19 RX ADMIN — PANTOPRAZOLE SODIUM 40 MG: 40 TABLET, DELAYED RELEASE ORAL at 08:53

## 2019-08-19 RX ADMIN — IPRATROPIUM BROMIDE AND ALBUTEROL SULFATE 3 ML: .5; 3 SOLUTION RESPIRATORY (INHALATION) at 12:08

## 2019-08-19 RX ADMIN — NYSTATIN: 100000 POWDER TOPICAL at 17:12

## 2019-08-19 RX ADMIN — VENLAFAXINE HYDROCHLORIDE 150 MG: 150 CAPSULE, EXTENDED RELEASE ORAL at 16:44

## 2019-08-19 RX ADMIN — PREDNISONE 40 MG: 20 TABLET ORAL at 08:53

## 2019-08-19 RX ADMIN — BUMETANIDE 1 MG: 0.25 INJECTION INTRAMUSCULAR; INTRAVENOUS at 08:54

## 2019-08-19 RX ADMIN — VENLAFAXINE HYDROCHLORIDE 150 MG: 150 CAPSULE, EXTENDED RELEASE ORAL at 08:52

## 2019-08-19 RX ADMIN — IPRATROPIUM BROMIDE AND ALBUTEROL SULFATE 3 ML: .5; 3 SOLUTION RESPIRATORY (INHALATION) at 15:50

## 2019-08-19 RX ADMIN — NYSTATIN: 100000 POWDER TOPICAL at 10:12

## 2019-08-19 RX ADMIN — ATORVASTATIN CALCIUM 40 MG: 40 TABLET, FILM COATED ORAL at 21:46

## 2019-08-19 RX ADMIN — INSULIN LISPRO 7 UNITS: 100 INJECTION, SOLUTION INTRAVENOUS; SUBCUTANEOUS at 16:44

## 2019-08-19 RX ADMIN — DABIGATRAN ETEXILATE MESYLATE 150 MG: 150 CAPSULE ORAL at 08:52

## 2019-08-19 NOTE — ROUTINE PROCESS
The following appointments have been successfully scheduled:    Date/time Tuesday, August 27, 2019 10:30 AM  Patient  Shahram Morales 1950 (59LQ F) #[id number] F#[id number]  Department [facility]MAIN OFFICE  Appointment type Transitional Care  Provider Eamon visit scheduled for 8/22/19 at [facility] will contact the patient for additional information and to confirm visit.   Apts added to AVS

## 2019-08-19 NOTE — PROGRESS NOTES
OCCUPATIONAL THERAPY EVALUATION/DISCHARGE  Patient: Galo Wilson (89 y.o. female)  Date: 8/19/2019  Primary Diagnosis: Acute asthma exacerbation [J45.901]       Precautions:        ASSESSMENT  Based on the objective data described below, the patient presents close to her baseline of decreased endurance, and generalized weakness. Pt was living at home with family and stated that she was independent with ADLs and ILS. Pt was able to transfer to and from surfaces, with use of SPC. Pt is independent with donning and doffing socks and stated that she has a reacher to pick items off the floor. Pt has her  at home with her and spouse is able to assist pt with ILS. Current Level of Function (ADLs/self-care): pt is able to bathe and dress self with setup and supervision. Functional Outcome Measure: The patient scored 95/100 on the Barthel outcome measure which is indicative of pt is able to return home with family . Other factors to consider for discharge: continue with her BUE and BLE exercise at home to improve his cardiovascular endurance      PLAN :  Recommend with staff: OOB bed for meals and walking in room with cane    Recommendation for discharge: (in order for the patient to meet his/her long term goals)  No skilled occupational therapy/ follow up rehabilitation needs identified at this time. This discharge recommendation:  Has been made in collaboration with the attending provider and/or case management    Equipment recommendations for successful discharge: none       SUBJECTIVE:   Patient stated I cant wait til I get home.     OBJECTIVE DATA SUMMARY:   HISTORY:   Past Medical History:   Diagnosis Date    Anxiety 1/22/2018    Arthritis     OA    Asthma     Diabetes ([address]     Essential hypertension     GERD (gastroesophageal reflux disease)     Hypercholesterolemia 1/22/2018    Hypertension     Ill-defined condition     diverticulitis    Long-term use of high-risk medication 1/22/2018    Neuropathy     Obesity (BMI 30-39.9) 4/30/2019    Other ill-defined conditions(799.89)     IBS, spinal stenosis    Plantar fasciitis     Psychiatric disorder     depression, anxiety    Sleep apnea     uses CPAP    Type 2 diabetes mellitus with diabetic neuropathy, without long-term current use of insulin ([facility] [address] 6/5/2016     Past Surgical History:   Procedure Laterality Date    CARDIAC SURG PROCEDURE UNLIST      stent    COLONOSCOPY N/A 3/14/2018    COLONOSCOPY performed by Pop Olsen MD at hospitals ENDOSCOPY    HX APPENDECTOMY      HX CHOLECYSTECTOMY  11/15/2018    HX HYSTERECTOMY      HX PACEMAKER         Prior Level of Function/Environment/Context: pt lives with  and was independent with ADLs and ILS  Expanded or extensive additional review of patient history:   Home Situation  Home Environment: Private residence  # Steps to Enter: 2  Rails to Enter: Yes  Hand Rails : Bilateral  One/Two Story Residence: One story  Living Alone: No  Support Systems: Child(chuck), Spouse/Significant Other/Partner  Patient Expects to be Discharged to[de-identified] Private residence  Current DME Used/Available at Home: Melissa Inch, straight, Walker, rolling, Walker, rollator, Commode, bedside, Grab bars, Shower chair  Tub or Shower Type: Tub/Shower combination    Hand dominance: Right    EXAMINATION OF PERFORMANCE DEFICITS:  Cognitive/Behavioral Status:  Neurologic State: Alert  Orientation Level: Appropriate for age  Cognition: Appropriate decision making  Perception: Appears intact  Perseveration: No perseveration noted  Safety/Judgement: Awareness of environment    Skin: in good health     Edema: swelling in BLE    Hearing: Auditory  Auditory Impairment: None    Vision/Perceptual:                 intact                    Range of Motion:  BUE intact           Strength:  Functional in BUE        Coordination:     Fine Motor Skills-Upper: Left Intact; Right Intact    Gross Motor Skills-Upper: Left Intact; Right Intact    Tone & Sensation:  Intact in BUE           Balance:  Sitting: Intact  Standing: Intact    Functional Mobility and Transfers for ADLs:  Bed Mobility:  Rolling: Independent  Supine to Sit: Independent  Sit to Supine: Independent  Scooting: Independent    Transfers:  Sit to Stand: Independent  Stand to Sit: Independent  Bed to Chair: Independent  Bathroom Mobility: Independent  Toilet Transfer : Independent    ADL Assessment:  Feeding: Independent    Oral Facial Hygiene/Grooming: Independent    Bathing: Independent    Upper Body Dressing: Independent    Lower Body Dressing: Independent    Toileting: Independent              Cognitive Retraining  Safety/Judgement: Awareness of environment         Functional Measure:  Barthel Index:    Bathin  Bladder: 10  Bowels: 10  Groomin  Dressing: 10  Feeding: 10  Mobility: 15  Stairs: 5  Toilet Use: 10  Transfer (Bed to Chair and Back): 15  Total: 95/100        Percentage of impairment   0%   1-19%   20-39%   40-59%   60-79%   80-99%   100%   Barthel Score 0-100 100 99-80 79-60 59-40 20-39 1-19   0     The Barthel ADL Index: Guidelines  1. The index should be used as a record of what a patient does, not as a record of what a patient could do. 2. The main aim is to establish degree of independence from any help, physical or verbal, however minor and for whatever reason. 3. The need for supervision renders the patient not independent. 4. A patient's performance should be established using the best available evidence. Asking the patient, friends/relatives and nurses are the usual sources, but direct observation and common sense are also important. However direct testing is not needed. 5. Usually the patient's performance over the preceding 24-48 hours is important, but occasionally longer periods will be relevant. 6. Middle categories imply that the patient supplies over 50 per cent of the effort. 7. Use of aids to be independent is allowed.     Gely Crumble., Barthel, D.W. (1965). Functional evaluation: the Barthel Index. [address] [facility] (14)2. [facility] ENRIQUE Ko, Cuca Escobedo.Yossi., Jennifer, [address] (1999). Measuring the change indisability after inpatient rehabilitation; comparison of the responsiveness of the Barthel Index and Functional [place] Measure. Journal of Neurology, Neurosurgery, and Psychiatry, 66(4), 428-386. SU Villeda, PAWAN Izaguirre, & Giuliana Squires M.A. (2004.) Assessment of post-stroke quality of life in cost-effectiveness studies: The usefulness of the Barthel Index and the EuroQoL-5D. Quality of Life Research, 15, 442-26         Occupational Therapy Evaluation Charge Determination   History Examination Decision-Making   LOW Complexity : Brief history review  LOW Complexity : 1-3 performance deficits relating to physical, cognitive , or psychosocial skils that result in activity limitations and / or participation restrictions  LOW Complexity : No comorbidities that affect functional and no verbal or physical assistance needed to complete eval tasks       Based on the above components, the patient evaluation is determined to be of the following complexity level: LOW         Activity Tolerance:   Good  Please refer to the flowsheet for vital signs taken during this treatment. After treatment patient left in no apparent distress:    Sitting in chair    COMMUNICATION/EDUCATION:   The patients plan of care was discussed with: Physical Therapist, Registered Nurse and .     Thank you for this referral.  Nikki Zaragoza OT  Time Calculation: 28 mins

## 2019-08-19 NOTE — PROGRESS NOTES
ADULT PROTOCOL: JET AEROSOL  REASSESSMENT    Patient  Melany Gardner     79 y.o.   female     8/18/2019  8:00 PM    Breath Sounds Pre Procedure: Right Breath Sounds: Diminished, Coarse                               Left Breath Sounds: Diminished, Coarse    Breath Sounds Post Procedure: Right Breath Sounds: Diminished, Coarse                                 Left Breath Sounds: Expiratory wheezing    Breathing pattern: Pre procedure Breathing Pattern: Regular          Post procedure Breathing Pattern: Regular    Heart Rate: Pre procedure Pulse: 80           Post procedure Pulse: 82    Resp Rate: Pre procedure Respirations: 21           Post procedure Respirations: 20      Oxygen: O2 Device: Room air        Changed: NO    SpO2: Pre procedure SpO2: 97 %   WITHOUT oxygen              Post procedure SpO2: 97 %  WITHOUT oxygen    Nebulizer Therapy: Current medications Aerosolized Medications: DuoNeb      Changed: NO      Problem List:   Patient Active Problem List   Diagnosis Code    Unspecified hereditary and idiopathic peripheral neuropathy G60.9    HBP (high blood pressure) I10    Spinal stenosis, lumbar region, without neurogenic claudication M48.061    Essential and other specified forms of tremor G25.0, G25.2    IBS (irritable bowel syndrome) K58.9    Depression F32.9    Migraine with aura, without mention of intractable migraine without mention of status migrainosus G43. 109    Right knee DJD M17.11    B12 deficiency E53.8    Ataxia R27.0    Foot pain, right M79.671    Fever R50.9    UTI (urinary tract infection) N39.0    Lower abdominal pain R10.30    Type 2 diabetes mellitus with diabetic neuropathy, without long-term current use of insulin (HCC) E11.40    Chronic atrial fibrillation (HCC) G13.4    Systolic CHF, chronic (HCC) I50.22    Cellulitis of left lower leg L03. 116    Anxiety F41.9    Hypercholesterolemia E78.00    Long-term use of high-risk medication Z79.899    Hypokalemia E87.6    Ischemic cardiomyopathy I25.5    CAD (coronary artery disease) I25.10    Epigastric abdominal pain R10.13    S/P placement of cardiac pacemaker Z95.0    Stage 3 chronic kidney disease (HCC) N18.3    Chronic cholecystitis K81.1    Asthma J45.909    Obesity (BMI 30-39. 9) E66.9    Acute asthma exacerbation J45. 901       Respiratory Therapist: Zunilda Garcia   ADULT PROTOCOL: JET AEROSOL  REASSESSMENT    Patient  Oralia Banks     79 y.o.   female     8/18/2019  8:00 PM    Breath Sounds Pre Procedure: Right Breath Sounds: Diminished, Coarse                               Left Breath Sounds: Diminished, Coarse    Breath Sounds Post Procedure: Right Breath Sounds: Diminished, Coarse                                 Left Breath Sounds: Expiratory wheezing    Breathing pattern: Pre procedure Breathing Pattern: Regular          Post procedure Breathing Pattern: Regular    Heart Rate: Pre procedure Pulse: 80           Post procedure Pulse: 82    Resp Rate: Pre procedure Respirations: 21           Post procedure Respirations: 20    Oxygen: O2 Device: Room air        Changed: NO    SpO2: Pre procedure SpO2: 97 %  WITHOUT oxygen              Post procedure SpO2: 97 %  WITHOUT oxygen    Nebulizer Therapy: Current medications Aerosolized Medications: DuoNeb      Changed: NO      Problem List:   Patient Active Problem List   Diagnosis Code    Unspecified hereditary and idiopathic peripheral neuropathy G60.9    HBP (high blood pressure) I10    Spinal stenosis, lumbar region, without neurogenic claudication M48.061    Essential and other specified forms of tremor G25.0, G25.2    IBS (irritable bowel syndrome) K58.9    Depression F32.9    Migraine with aura, without mention of intractable migraine without mention of status migrainosus G43. 109    Right knee DJD M17.11    B12 deficiency E53.8    Ataxia R27.0    Foot pain, right M79.671    Fever R50.9    UTI (urinary tract infection) N39.0    Lower abdominal pain R10.30    Type 2 diabetes mellitus with diabetic neuropathy, without long-term current use of insulin (HCC) E11.40    Chronic atrial fibrillation (HCC) N35.2    Systolic CHF, chronic (HCC) I50.22    Cellulitis of left lower leg L03. 116    Anxiety F41.9    Hypercholesterolemia E78.00    Long-term use of high-risk medication Z79.899    Hypokalemia E87.6    Ischemic cardiomyopathy I25.5    CAD (coronary artery disease) I25.10    Epigastric abdominal pain R10.13    S/P placement of cardiac pacemaker Z95.0    Stage 3 chronic kidney disease (HCC) N18.3    Chronic cholecystitis K81.1    Asthma J45.909    Obesity (BMI 30-39. 9) E66.9    Acute asthma exacerbation J45. Viru [address]       Respiratory Therapist: Quinn Cope

## 2019-08-19 NOTE — PROGRESS NOTES
Hospitalist Progress Note    NAME: Clarence Holder   :  1950   MRN:  [id number]       Assessment / Plan:    Acute hypoxic respiratory failure POA improved  Likely multifactorial from HF and Asthma exacerbation ( wheezing on admission), resolved   Acute on chronic systolic HF: s/p Echo 3/76, LVF 21 - 25%  S/p ICD  S/P cardiac cath in 2019 shows mild CAD  NICM  CXR: Cardiomegaly with pacemaker and mild interstitial edema  proBNP 5632  -daily weight, ? Lost 16 lbs in last 24 hours ( pt doesn't remember being weighed, unsure if weight from yesterday is correct), will get baseline weight  -strict I and O, on IV bumex BID  -will change IV solu-medrol to prednisone with quick weaning  -cont scheduled and prn duo-neb  -will d/c abx( no fever or wbc)  -wean oxygen down as tolerated  Start Solu-Medrol, DuoNeb and azithromycin.  Continue oxygen by nasal cannula  -Consulted cardiology    Hypokalemia POA  -replaced and wnl    Right sided fungal infection, abdominal fold  -nystatin powder and keep it dry    History of CKD stage III APO  -cr baseline  -monitor lytes and renal function while being diuresis    History of atrial fibrillation with controlled rate POA  Hypertension, accelerated POA, better controlled  -Continue home metoprolol and Pradaxa  -Continue home lisinopril and amlodipine    Dyslipidemia POA  -Continue home atorvastatin    Depression POA  -Continue home Effexor    Obese  Body mass index is 37.41 kg/m².     DVT Prophylaxis: pradaxa  Code Status: full  Surrogate Decision Maker:  Jesus  Baseline: From home independent         Subjective:     Chief Complaint / Reason for Physician Visit  \"I am breathing better, no new complaints today\". Discussed with RN events overnight.      Review of Systems:  Symptom Y/N Comments  Symptom Y/N Comments   Fever/Chills n   Chest Pain     Poor Appetite    Edema y    Cough n   Abdominal Pain     Sputum    Joint Pain n    SOB/STEEN y   Pruritis/Rash Nausea/vomit n   Tolerating PT/OT y    Diarrhea n   Tolerating Diet y    Constipation    Other       Could NOT obtain due to:      Objective:     VITALS:   Last 24hrs VS reviewed since prior progress note. Most recent are:  Patient Vitals for the past 24 hrs:   Temp Pulse Resp BP SpO2   08/19/19 1117 97.6 °F (36.4 °C) 79 20 (!) 152/91 97 %   08/19/19 0810 97.6 °F (36.4 °C) 83 20 (!) 152/99 100 %   08/19/19 0721     95 %   08/19/19 0441    (!) 155/117    08/19/19 0426 97.7 °F (36.5 °C) 79 18  93 %   08/18/19 2316 97.8 °F (36.6 °C) 80 18 146/83 92 %   08/18/19 1953     97 %   08/18/19 1932 98.5 °F (36.9 °C) 81 18 (!) 154/91 98 %   08/18/19 1634 97.9 °F (36.6 °C) 80 18 155/81 94 %   08/18/19 1510     92 %   08/18/19 1324  82 20 122/85 95 %       Intake/Output Summary (Last 24 hours) at 8/19/2019 1223  Last data filed at 8/19/2019 0850  Gross per 24 hour   Intake 480 ml   Output 1200 ml   Net -720 ml        PHYSICAL EXAM:  General: Morbidly obese. Alert, cooperative, no acute distress    EENT:  EOMI. Anicteric sclerae. MMM  Resp:  CTA bilaterally. No accessory muscle use  CV:  Regular  rhythm,  No edema  GI:  Soft, Non distended, Non tender.  +Bowel sounds, abdominal skin fold candidal infx  Neurologic:  Alert and oriented X 3, normal speech, cn 2-12 grossly intact  Psych:   Good insight. Not anxious nor agitated  Skin:  No rashes. No jaundice    Reviewed most current lab test results and cultures  YES  Reviewed most current radiology test results   YES  Review and summation of old records today    NO  Reviewed patient's current orders and MAR    YES  PMH/SH reviewed - no change compared to H&P  ________________________________________________________________________  Care Plan discussed with:    Comments   Patient x    Family      RN x    Care Manager     Consultant                        Multidiciplinary team rounds were held today with , nursing, pharmacist and clinical coordinator. Patient's plan of care was discussed; medications were reviewed and discharge planning was addressed. ________________________________________________________________________  Total NON critical care TIME:     Minutes    Total CRITICAL CARE TIME Spent:   Minutes non procedure based      Comments   >50% of visit spent in counseling and coordination of care     ________________________________________________________________________  Vidhya Mccabe MD     Procedures: see electronic medical records for all procedures/Xrays and details which were not copied into this note but were reviewed prior to creation of Plan. LABS:  I reviewed today's most current labs and imaging studies.   Pertinent labs include:  Recent Labs     08/18/19  0520 08/17/19  0814   WBC 8.9 8.7   HGB 10.6* 11.7   HCT 35.7 38.1    197     Recent Labs     08/18/19  0520 08/17/19  0814    143   K 3.6 3.1*    106   CO2 26 30   * 163*   BUN 25* 21*   CREA 1.53* 1.45*   CA 7.8* 8.4*       Signed: Vidhya Mccabe MD

## 2019-08-19 NOTE — PROGRESS NOTES
1743: Bedside report received from [address] [place] [address]. Assumed care of pt.     1113: Spoke with Dr. Leslie Paredes during round regarding pt mobility. Verbal orders received for PT/OT. 1649: Per physical therapy, ok for pt to be up ad tessie with cane. Pt instructed to call for help if feeling weak, dizzy, or lightheaded. 1900:   Bedside shift change report GIVEN TO DIAN Davis. Report included the following information SBAR, Kardex, Intake/Output, MAR, Recent Results and Cardiac Rhythm Paced. SIGNIFICANT CHANGES DURING SHIFT:  Uneventful shift. CONCERNS TO ADDRESS WITH MD:  None. [address] Kal[address]iscoe NURSING NOTE   Admission Date 8/17/2019   Admission Diagnosis Acute asthma exacerbation [J45.901]   Consults IP CONSULT TO HOSPITALIST  IP CONSULT TO CARDIOLOGY      Cardiac Monitoring [x] Yes [] No      Purposeful Hourly Rounding [x] Yes    Ananya Score Total Score: 3   Ananya score 3 or > [] Bed Alarm [] Avasys [] 1:1 sitter [] Patient refused (Signed refusal form in chart)   Preet Score Preet Score: 18   Preet score 14 or < [] PMT consult [] Wound Care consult    []  Specialty bed  [] Nutrition consult      Influenza Vaccine Received Flu Vaccine for Current Season (usually Sept-March): Not Flu Season           Oxygen needs? [x] Room air Oxygen @  []1L    []2L    []3L   []4L    []5L   []6L via  NC   Chronic home O2 use?  [] Yes [] No  Perform O2 challenge test and document in progress note using smartphrase (.Homeoxygen)      Last bowel movement Last Bowel Movement Date: 08/16/19      Urinary Catheter             LDAs               Peripheral IV 08/17/19 Right Antecubital (Active)   Site Assessment Clean, dry, & intact 8/19/2019  3:40 PM   Phlebitis Assessment 0 8/19/2019  3:40 PM   Infiltration Assessment 0 8/19/2019  3:40 PM   Dressing Status Clean, dry, & intact 8/19/2019  3:40 PM   Dressing Type Tape;Transparent 8/19/2019  3:40 PM   Hub Color/Line Status Pink;Flushed 8/19/2019  3:40 PM   Alcohol Cap Used No 8/19/2019 6:25 AM                         Readmission Risk Assessment Tool Score High Risk            35       Total Score        3 Has Seen PCP in Last 6 Months (Yes=3, No=0)    2 . Living with Significant Other. Assisted Living. LTAC. SNF. or   Rehab    4 IP Visits Last 12 Months (1-3=4, 4=9, >4=11)    5 Pt.  Coverage (Medicare=5 , Medicaid, or Self-Pay=4)    21 Charlson Comorbidity Score (Age + Comorbid Conditions)        Criteria that do not apply:    Patient Length of Stay (>5 days = 3)       Expected Length of Stay - - -   Actual Length of Stay 2

## 2019-08-19 NOTE — WOUND CARE
Wound care nurse consult stating \" excoriation in the abd folds\". Patient is a 80 y/o CF admitted for an acute asthma exacerbation. Past Medical History:   Diagnosis Date    Anxiety 1/22/2018    Arthritis     OA    Asthma     Diabetes ([facility] [address]     Essential hypertension     GERD (gastroesophageal reflux disease)     Hypercholesterolemia 1/22/2018    Hypertension     Ill-defined condition     diverticulitis    Long-term use of high-risk medication 1/22/2018    Neuropathy     Obesity (BMI 30-39.9) 4/30/2019    Other ill-defined conditions(799.89)     IBS, spinal stenosis    Plantar fasciitis     Psychiatric disorder     depression, anxiety    Sleep apnea     uses CPAP    Type 2 diabetes mellitus with diabetic neuropathy, without long-term current use of insulin ([facility] [address] 6/5/2016     Patient has Nystatin antifungal powder all ready ordered by MD. [facility] nurse has nothing else to recommend.     Brenna Nash RN, [employer]

## 2019-08-19 NOTE — PROGRESS NOTES
PHYSICAL THERAPY EVALUATION/DISCHARGE  Patient: Av Thomas (89 y.o. female)  Date: 8/19/2019  Primary Diagnosis: Acute asthma exacerbation [J45.901]       Precautions:          ASSESSMENT  Based on the objective data described below, the patient presents with generalized weakness and decreased activity tolerance due to recent asthma exacerbation with resultant decreased activity. Pt received sitting in bedside chair, reporting that she feels as if she is at her baseline with mobility and her activity tolerance is improving. PT evaluation completed and pt educated on performing seated LE exercises and moving frequently in her room, as she was cleared to be up ad tessie in room with her cane. O2 sats 100% on RA but slight sob noted after ambulation and while participating in a balance test.  No other deficits identified that warrant skilled PT intervention in this setting or at discharge. HHPT discussed but pt reports that she will be fine at home and didn't think she needed it. Functional Outcome Measure: The patient scored 95/100 on the Barthel Index outcome measure which is indicative of 1-19% impaired function. Other factors to consider for discharge: none     Further skilled acute physical therapy is not indicated at this time. PLAN :  Recommendation for discharge: (in order for the patient to meet his/her long term goals)  No skilled physical therapy/ follow up rehabilitation needs identified at this time. This discharge recommendation:  Has been made in collaboration with the attending provider and/or case management    Equipment recommendations for successful discharge: none       SUBJECTIVE:   Patient stated I know what to do, I just have to do it.     OBJECTIVE DATA SUMMARY:   HISTORY:    Past Medical History:   Diagnosis Date    Anxiety 1/22/2018    Arthritis     OA    Asthma     Diabetes ([address]     Essential hypertension     GERD (gastroesophageal reflux disease)     Hypercholesterolemia 1/22/2018    Hypertension     Ill-defined condition     diverticulitis    Long-term use of high-risk medication 1/22/2018    Neuropathy     Obesity (BMI 30-39.9) 4/30/2019    Other ill-defined conditions(799.89)     IBS, spinal stenosis    Plantar fasciitis     Psychiatric disorder     depression, anxiety    Sleep apnea     uses CPAP    Type 2 diabetes mellitus with diabetic neuropathy, without long-term current use of insulin ([address] 6/5/2016     Past Surgical History:   Procedure Laterality Date    CARDIAC SURG PROCEDURE UNLIST      stent    COLONOSCOPY N/A 3/14/2018    COL[facility] ENDOSCOPY    HX APPENDECTOMY      HX CHOLECYSTECTOMY  11/15/2018    HX HYSTERECTOMY      HX PACEMAKER         Prior level of function:  Mod I at baseline, ambulating with spc. Lives with  in a one story home, no assistance provided from  for adls  Personal factors and/or comorbidities impacting plan of care: none    Home Situation  Home Environment: Private residence  # Steps to Enter: 2  Rails to Enter: Yes  Hand Rails : Bilateral  One/Two Story Residence: One story  Living Alone: No  Support Systems: Child(chuck), Spouse/Significant Other/Partner  Patient Expects to be Discharged to[de-identified] Private residence  Current DME Used/Available at Home: Angelic Sauger, straight, Walker, rolling, Walker, rollator, Commode, bedside, Grab bars, Shower chair  Tub or Shower Type: Tub/Shower combination    EXAMINATION/PRESENTATION/DECISION MAKING:   Critical Behavior:  Neurologic State: Alert  Orientation Level: Appropriate for age  Cognition: Appropriate decision making  Safety/Judgement: Awareness of environment  Hearing: Auditory  Auditory Impairment: None  Range Of Motion:  AROM: Within functional limits           PROM: Within functional limits           Strength:    Strength:  Within functional limits                    Tone & Sensation:   Tone: Normal Sensation: Impaired(B foot neuropathies/no sensation)               Coordination:  Coordination: Within functional limits  Vision:      Functional Mobility:  Bed Mobility:  Rolling: Independent  Supine to Sit: Independent  Sit to Supine: Independent  Scooting: Independent  Transfers:  Sit to Stand: Independent  Stand to Sit: Independent        Bed to Chair: Independent              Balance:   Sitting: Intact  Standing: Intact  Ambulation/Gait Training:  Distance (ft): 32 Feet (ft)  Assistive Device: Gait belt;Cane, straight  Ambulation - Level of Assistance: Modified independent     Gait Description (WDL): Within defined limits  Gait Abnormalities: Decreased step clearance        Base of Support: (wnl)     Speed/Brielle: Pace decreased (<100 feet/min)  Step Length: Right shortened;Left shortened      Therapeutic Exercises:   Educated pt to perform seated: Toe raises, heel raises, laq, marches, and hip abd  2 sets x 10 reps    Functional Measure:  Barthel Index:    Bathin  Bladder: 10  Bowels: 10  Groomin  Dressing: 10  Feeding: 10  Mobility: 15  Stairs: 5  Toilet Use: 10  Transfer (Bed to Chair and Back): 15  Total: 95/100       Percentage of impairment   0%   1-19%   20-39%   40-59%   60-79%   80-99%   100%   Barthel Score 0-100 100 99-80 79-60 59-40 20-39 1-19   0     The Barthel ADL Index: Guidelines  1. The index should be used as a record of what a patient does, not as a record of what a patient could do. 2. The main aim is to establish degree of independence from any help, physical or verbal, however minor and for whatever reason. 3. The need for supervision renders the patient not independent. 4. A patient's performance should be established using the best available evidence. Asking the patient, friends/relatives and nurses are the usual sources, but direct observation and common sense are also important. However direct testing is not needed.   5. Usually the patient's performance over the preceding 24-48 hours is important, but occasionally longer periods will be relevant. 6. Middle categories imply that the patient supplies over 50 per cent of the effort. 7. Use of aids to be independent is allowed. Sandra Lala., Barthel, D.W. (5966). Functional evaluation: the Barthel Index. [address] [facility] (14)2. Bethene Hammer femi [place], J.J.M.F, Gustavo Chen.Frank Killen, [address] [place] [address] (). Measuring the change indisability after inpatient rehabilitation; comparison of the responsiveness of the Barthel Index and Functional [place] Measure. Journal of Neurology, Neurosurgery, and Psychiatry, 66(4), 631-164. Paul Dawkins, CORTNEY.J.LEANDRO, PAWAN Izaguirre, & Tahir Lazaro M.A. (2004.) Assessment of post-stroke quality of life in cost-effectiveness studies: The usefulness of the Barthel Index and the EuroQoL-5D. Quality of Life Research, 15, 421-10          Physical Therapy Evaluation Charge Determination   History Examination Presentation Decision-Making   LOW Complexity : Zero comorbidities / personal factors that will impact the outcome / POC LOW Complexity : 1-2 Standardized tests and measures addressing body structure, function, activity limitation and / or participation in recreation  LOW Complexity : Stable, uncomplicated  LOW Complexity : FOTO score of       Based on the above components, the patient evaluation is determined to be of the following complexity level: LOW     Pain Ratin/10    Activity Tolerance:   Fair  Please refer to the flowsheet for vital signs taken during this treatment. After treatment patient left in no apparent distress:   Sitting in chair    COMMUNICATION/EDUCATION:   The patients plan of care was discussed with: Occupational Therapist and Registered Nurse. Fall prevention education was provided and the patient/caregiver indicated understanding.     Thank you for this referral.  Renny Vazquez, PT   Time Calculation: 30 mins

## 2019-08-19 NOTE — PROGRESS NOTES
Cardiology Progress Note      8/19/2019 1:24 PM    Admit Date: 8/17/2019          Subjective: Feels better on steroids mariola. No new c/o. Visit Vitals  BP (!) 152/91 (BP 1 Location: Left arm, BP Patient Position: Sitting)   Pulse 79   Temp 97.6 °F (36.4 °C)   Resp 20   Ht 5' 9\" (1.753 m)   Wt 111.9 kg (246 lb 11.1 oz)   SpO2 97%   BMI 36.43 kg/m²     08/17 1901 - 08/19 0700  In: 240 [P.O.:240]  Out: 2500 [Urine:2500]        Objective:      Physical Exam:  VS as above  Chest no wheezes  Card RRR no changes     Data Review:   Labs:        Telemetry: SR       Assessment:     1.  Acute respiratory distress due to either asthma, pulmonary edema, or both. 2.  History of cardiomyopathy with ejection fraction about 20% by  Echocardiogram yest   3.  Coronary artery disease with prior percutaneous transluminal coronary angioplasty and stenting of the left anterior descending. 4.  History of paroxysmal atrial fibrillation. 5.  Prior pacemaker/ICD  placement   6.  Type 2 diabetes. 7.  Sleep apnea. 8.  Obesity. 9.  Chronic renal insufficiency. Plan: Cont current Rx. Seems to be improving.

## 2019-08-19 NOTE — PROGRESS NOTES
Problem: Falls - Risk of  Goal: *Absence of Falls  Description  Document [place]irt Fall Risk and appropriate interventions in the flowsheet.   Outcome: Progressing Towards Goal  Note:   Fall Risk Interventions:  Mobility Interventions: Bed/chair exit alarm, Communicate number of staff needed for ambulation/transfer, OT consult for ADLs, Patient to call before getting OOB, PT Consult for mobility concerns, PT Consult for assist device competence, Utilize gait belt for transfers/ambulation         Medication Interventions: Bed/chair exit alarm, Evaluate medications/consider consulting pharmacy, Patient to call before getting OOB, Teach patient to arise slowly, Utilize gait belt for transfers/ambulation    Elimination Interventions: Bed/chair exit alarm, Call light in reach, Elevated toilet seat, Patient to call for help with toileting needs, Toilet paper/wipes in reach, Toileting schedule/hourly rounds

## 2019-08-20 LAB
GLUCOSE BLD STRIP.AUTO-MCNC: 117 MG/DL (ref 65–100)
GLUCOSE BLD STRIP.AUTO-MCNC: 158 MG/DL (ref 65–100)
GLUCOSE BLD STRIP.AUTO-MCNC: 264 MG/DL (ref 65–100)
GLUCOSE BLD STRIP.AUTO-MCNC: 289 MG/DL (ref 65–100)
SERVICE CMNT-IMP: ABNORMAL

## 2019-08-20 PROCEDURE — 65660000000 HC RM CCU STEPDOWN

## 2019-08-20 PROCEDURE — 74011250637 HC RX REV CODE- 250/637: Performed by: INTERNAL MEDICINE

## 2019-08-20 PROCEDURE — 82962 GLUCOSE BLOOD TEST: CPT

## 2019-08-20 PROCEDURE — 74011636637 HC RX REV CODE- 636/637: Performed by: HOSPITALIST

## 2019-08-20 PROCEDURE — 94760 N-INVAS EAR/PLS OXIMETRY 1: CPT

## 2019-08-20 PROCEDURE — 94640 AIRWAY INHALATION TREATMENT: CPT

## 2019-08-20 PROCEDURE — 74011636637 HC RX REV CODE- 636/637: Performed by: FAMILY MEDICINE

## 2019-08-20 PROCEDURE — 74011000250 HC RX REV CODE- 250: Performed by: INTERNAL MEDICINE

## 2019-08-20 RX ORDER — IPRATROPIUM BROMIDE AND ALBUTEROL SULFATE 2.5; .5 MG/3ML; MG/3ML
3 SOLUTION RESPIRATORY (INHALATION)
Status: DISCONTINUED | OUTPATIENT
Start: 2019-08-21 | End: 2019-08-21 | Stop reason: HOSPADM

## 2019-08-20 RX ADMIN — IPRATROPIUM BROMIDE AND ALBUTEROL SULFATE 3 ML: .5; 3 SOLUTION RESPIRATORY (INHALATION) at 12:51

## 2019-08-20 RX ADMIN — IPRATROPIUM BROMIDE AND ALBUTEROL SULFATE 3 ML: .5; 3 SOLUTION RESPIRATORY (INHALATION) at 08:30

## 2019-08-20 RX ADMIN — METOPROLOL SUCCINATE 100 MG: 50 TABLET, EXTENDED RELEASE ORAL at 09:04

## 2019-08-20 RX ADMIN — AMLODIPINE BESYLATE 5 MG: 5 TABLET ORAL at 09:04

## 2019-08-20 RX ADMIN — Medication 10 ML: at 05:04

## 2019-08-20 RX ADMIN — LISINOPRIL 40 MG: 20 TABLET ORAL at 09:02

## 2019-08-20 RX ADMIN — FLUTICASONE FUROATE AND VILANTEROL TRIFENATATE 1 PUFF: 100; 25 POWDER RESPIRATORY (INHALATION) at 09:09

## 2019-08-20 RX ADMIN — BUMETANIDE 1 MG: 0.25 INJECTION INTRAMUSCULAR; INTRAVENOUS at 17:28

## 2019-08-20 RX ADMIN — PREGABALIN 200 MG: 150 CAPSULE ORAL at 17:29

## 2019-08-20 RX ADMIN — ASPIRIN 81 MG 81 MG: 81 TABLET ORAL at 09:08

## 2019-08-20 RX ADMIN — PREGABALIN 200 MG: 150 CAPSULE ORAL at 09:05

## 2019-08-20 RX ADMIN — ATORVASTATIN CALCIUM 40 MG: 40 TABLET, FILM COATED ORAL at 21:34

## 2019-08-20 RX ADMIN — Medication 10 ML: at 21:34

## 2019-08-20 RX ADMIN — PANTOPRAZOLE SODIUM 40 MG: 40 TABLET, DELAYED RELEASE ORAL at 09:03

## 2019-08-20 RX ADMIN — IPRATROPIUM BROMIDE AND ALBUTEROL SULFATE 3 ML: .5; 3 SOLUTION RESPIRATORY (INHALATION) at 16:08

## 2019-08-20 RX ADMIN — NYSTATIN: 100000 POWDER TOPICAL at 17:29

## 2019-08-20 RX ADMIN — Medication 10 ML: at 17:29

## 2019-08-20 RX ADMIN — VENLAFAXINE HYDROCHLORIDE 150 MG: 150 CAPSULE, EXTENDED RELEASE ORAL at 09:06

## 2019-08-20 RX ADMIN — INSULIN LISPRO 4 UNITS: 100 INJECTION, SOLUTION INTRAVENOUS; SUBCUTANEOUS at 21:33

## 2019-08-20 RX ADMIN — DABIGATRAN ETEXILATE MESYLATE 150 MG: 150 CAPSULE ORAL at 17:28

## 2019-08-20 RX ADMIN — VENLAFAXINE HYDROCHLORIDE 150 MG: 150 CAPSULE, EXTENDED RELEASE ORAL at 17:29

## 2019-08-20 RX ADMIN — DABIGATRAN ETEXILATE MESYLATE 150 MG: 150 CAPSULE ORAL at 09:05

## 2019-08-20 RX ADMIN — INSULIN LISPRO 7 UNITS: 100 INJECTION, SOLUTION INTRAVENOUS; SUBCUTANEOUS at 17:28

## 2019-08-20 RX ADMIN — PREDNISONE 40 MG: 20 TABLET ORAL at 09:03

## 2019-08-20 RX ADMIN — INSULIN LISPRO 3 UNITS: 100 INJECTION, SOLUTION INTRAVENOUS; SUBCUTANEOUS at 12:34

## 2019-08-20 RX ADMIN — BUMETANIDE 1 MG: 0.25 INJECTION INTRAMUSCULAR; INTRAVENOUS at 09:05

## 2019-08-20 RX ADMIN — IPRATROPIUM BROMIDE AND ALBUTEROL SULFATE 3 ML: .5; 3 SOLUTION RESPIRATORY (INHALATION) at 19:25

## 2019-08-20 RX ADMIN — NYSTATIN: 100000 POWDER TOPICAL at 09:09

## 2019-08-20 NOTE — PROGRESS NOTES
Problem: Falls - Risk of  Goal: *Absence of Falls  Description  Document Cass Weston Fall Risk and appropriate interventions in the flowsheet. Outcome: Progressing Towards Goal  Note:   Fall Risk Interventions:  Mobility Interventions: Communicate number of staff needed for ambulation/transfer, OT consult for ADLs, PT Consult for mobility concerns, Utilize walker, cane, or other assistive device         Medication Interventions: Patient to call before getting OOB, Teach patient to arise slowly    Elimination Interventions: Call light in reach, Patient to call for help with toileting needs, Toileting schedule/hourly rounds              Problem: Patient Education: Go to Patient Education Activity  Goal: Patient/Family Education  Outcome: Progressing Towards Goal     Problem: Risk for Spread of Infection  Goal: Prevent transmission of infectious organism to others  Description  Prevent the transmission of infectious organisms to other patients, staff members, and visitors. Outcome: Progressing Towards Goal     Problem: Patient Education:  Go to Education Activity  Goal: Patient/Family Education  Outcome: Progressing Towards Goal     Problem: Pressure Injury - Risk of  Goal: *Prevention of pressure injury  Description  Document Preet Scale and appropriate interventions in the flowsheet. Outcome: Progressing Towards Goal  Note:   Pressure Injury Interventions:  Sensory Interventions: Assess changes in LOC, Check visual cues for pain, Discuss PT/OT consult with provider, Float heels, Keep linens dry and wrinkle-free, Maintain/enhance activity level, Minimize linen layers, Turn and reposition approx.  every two hours (pillows and wedges if needed)    Moisture Interventions: Absorbent underpads, Apply protective barrier, creams and emollients, Check for incontinence Q2 hours and as needed, Limit adult briefs, Minimize layers, Moisture barrier    Activity Interventions: Increase time out of bed, Pressure redistribution bed/mattress(bed type), PT/OT evaluation    Mobility Interventions: Float heels, HOB 30 degrees or less, PT/OT evaluation, Turn and reposition approx. every two hours(pillow and wedges)    Nutrition Interventions: Document food/fluid/supplement intake, Offer support with meals,snacks and hydration    Friction and Shear Interventions: Feet elevated on foot rest, Foam dressings/transparent film/skin sealants, HOB 30 degrees or less, Lift sheet, Lift team/patient mobility team                Problem: Patient Education: Go to Patient Education Activity  Goal: Patient/Family Education  Outcome: Progressing Towards Goal     Problem: Breathing Pattern - Ineffective  Goal: *Absence of hypoxia  Outcome: Progressing Towards Goal  Goal: *Use of effective breathing techniques  Outcome: Progressing Towards Goal    1900 - bedside shift report given to Columba Hutson RN and Edwin RN.

## 2019-08-20 NOTE — PROGRESS NOTES
ADULT PROTOCOL: JET AEROSOL ASSESSMENT    Patient  Marcia Verma     79 y.o.   female     8/19/2019  8:37 PM    Breath Sounds Pre Procedure: Right Breath Sounds: Diminished, Coarse                               Left Breath Sounds: Diminished, Coarse    Breath Sounds Post Procedure: Right Breath Sounds: Diminished, Coarse                                 Left Breath Sounds: Diminished, Coarse    Heart Rate: Pre procedure Pulse: 83           Post procedure Pulse: 80    Resp Rate: Pre procedure Respirations: 20           Post procedure Respirations: 20    Oxygen: O2 Device: Room air        Changed: NO    SpO2: Pre procedure SpO2: 93 %   without oxygen              Post procedure SpO2: 99 %  without oxygen    Nebulizer Therapy: Current medications Aerosolized Medications: DuoNeb      Changed: NO    Smoking History: Never Smoked  Problem List:   Patient Active Problem List   Diagnosis Code    Unspecified hereditary and idiopathic peripheral neuropathy G60.9    HBP (high blood pressure) I10    Spinal stenosis, lumbar region, without neurogenic claudication M48.061    Essential and other specified forms of tremor G25.0, G25.2    IBS (irritable bowel syndrome) K58.9    Depression F32.9    Migraine with aura, without mention of intractable migraine without mention of status migrainosus G43. 109    Right knee DJD M17.11    B12 deficiency E53.8    Ataxia R27.0    Foot pain, right M79.671    Fever R50.9    UTI (urinary tract infection) N39.0    Lower abdominal pain R10.30    Type 2 diabetes mellitus with diabetic neuropathy, without long-term current use of insulin (HCC) E11.40    Chronic atrial fibrillation (HCC) S65.9    Systolic CHF, chronic (HCC) I50.22    Cellulitis of left lower leg L03. 116    Anxiety F41.9    Hypercholesterolemia E78.00    Long-term use of high-risk medication Z79.899    Hypokalemia E87.6    Ischemic cardiomyopathy I25.5    CAD (coronary artery disease) I25.10    Epigastric abdominal pain R10.13    S/P placement of cardiac pacemaker Z95.0    Stage 3 chronic kidney disease (HCC) N18.3    Chronic cholecystitis K81.1    Asthma J45.909    Obesity (BMI 30-39. 9) E66.9    Acute asthma exacerbation J45. Viru 65       Respiratory Therapist: Pino Viera RT

## 2019-08-20 NOTE — PROGRESS NOTES
Cardiology Progress Note      8/20/2019 8:21 AM    Admit Date: 8/17/2019          Subjective:  Continues to feel better         Visit Vitals  /90 (BP 1 Location: Left arm, BP Patient Position: Sitting)   Pulse 80   Temp 98 °F (36.7 °C)   Resp 20   Ht 5' 9\" (1.753 m)   Wt 119 kg (262 lb 5.6 oz)   SpO2 95%   BMI 38.74 kg/m²     08/18 1901 - 08/20 0700  In: 1080 [P.O.:1080]  Out: 1600 [Urine:1600]        Objective:      Physical Exam:  VS as above  Chest no wheezes  Card RRR no changes     Data Review:   Labs:        Telemetry: V pacing R 89       Assessment:     1.  Acute respiratory distress due to either asthma, pulmonary edema, or both- better   2.  History of cardiomyopathy with ejection fraction about 20% by  Echocardiogram yest   3.  Coronary artery disease with prior percutaneous transluminal coronary angioplasty and stenting of the left anterior descending. 4.  History of paroxysmal atrial fibrillation. 5.  Prior pacemaker/ICD  placement   6.  Type 2 diabetes. 7.  Sleep apnea. 8.  Obesity. 9.  Chronic renal insufficiency. Plan:  Cont current Rx. Nothing to add today.

## 2019-08-20 NOTE — PROGRESS NOTES
Problem: Falls - Risk of  Goal: *Absence of Falls  Description  Document Esaw Dunn Fall Risk and appropriate interventions in the flowsheet. Outcome: Progressing Towards Goal  Note:   Fall Risk Interventions:  Mobility Interventions: Patient to call before getting OOB         Medication Interventions: Patient to call before getting OOB    Elimination Interventions: Call light in reach              Problem: Patient Education: Go to Patient Education Activity  Goal: Patient/Family Education  Outcome: Progressing Towards Goal     Problem: Risk for Spread of Infection  Goal: Prevent transmission of infectious organism to others  Description  Prevent the transmission of infectious organisms to other patients, staff members, and visitors. Outcome: Progressing Towards Goal     Problem: Patient Education:  Go to Education Activity  Goal: Patient/Family Education  Outcome: Progressing Towards Goal     Problem: Pressure Injury - Risk of  Goal: *Prevention of pressure injury  Description  Document Preet Scale and appropriate interventions in the flowsheet.   Outcome: Progressing Towards Goal  Note:   Pressure Injury Interventions:  Sensory Interventions: Assess need for specialty bed, Assess changes in LOC    Moisture Interventions: Absorbent underpads, Apply protective barrier, creams and emollients    Activity Interventions: Assess need for specialty bed, Chair cushion, Increase time out of bed    Mobility Interventions: Assess need for specialty bed, Chair cushion, Float heels    Nutrition Interventions: Document food/fluid/supplement intake, Offer support with meals,snacks and hydration    Friction and Shear Interventions: Feet elevated on foot rest, Apply protective barrier, creams and emollients, Foam dressings/transparent film/skin sealants, HOB 30 degrees or less                Problem: Patient Education: Go to Patient Education Activity  Goal: Patient/Family Education  Outcome: Progressing Towards Goal     Problem: Breathing Pattern - Ineffective  Goal: *Absence of hypoxia  Outcome: Progressing Towards Goal  Goal: *Use of effective breathing techniques  Outcome: Progressing Towards Goal

## 2019-08-20 NOTE — PROGRESS NOTES
[address] [facility] INTERDISCIPLINARY ROUNDS    Cardiopulmonary Care Interdisciplinary Rounds were held today to discuss patient's plan of care and outcomes. The following members were present: Pharmacy, Nursing and Case Management.   Expected Length of Stay:  - - -    PLAN OF CARE:   Continue current treatment plan

## 2019-08-20 NOTE — CARDIO/PULMONARY
Cardiopulmonary Rehab:    Chart reviewed. Pt is on the CHF bundle list. Last taught and provided CHF folder in 2018. Living with Heart Failure Booklet given to Maria Tmeme Gemma. Educated using teach back method. Discussed diagnosis definition and assessed patient understanding. Reviewed importance of daily weight monitoring and Low Sodium diet (9187-6306 mg. daily). Encouraged activity and rest periods within symptom limitations and as ordered by physician. Reviewed bumex, purpose of medication, potential side effects, compliance, and what to do if dose is missed. Discussed importance of reporting signs and symptoms of exacerbation, and when to report them to the doctor, to prevent re-hospitalization. Odessa Menendez was encouraged to keep all appointments with doctor. Pt admits to eating out much as they done cook anymore. Reports eating early dinner, and snacking the rest of the day. Pt weighs daily and reports her weight has been increasing 1 or 2 lbs a day. Reminded to alert her MD with this gain, to avoid hospitalization. Pt correctly identified name and purpose of her diuretic. Encouraged daily walking for exercise. States she used to, but has gotten away from it. Odessa Menendez is well versed in CHF management.

## 2019-08-20 NOTE — PROGRESS NOTES
ADULT PROTOCOL: [facility] AEROSOL  REASSESSMENT    Patient  Yossi Hernandez     79 y.o.   female     8/20/2019  12:56 PM    Breath Sounds Pre Procedure: Right Breath Sounds: Diminished                               Left Breath Sounds: Coarse    Breath Sounds Post Procedure: Right Breath Sounds: Coarse                                 Left Breath Sounds: Coarse    Breathing pattern: Pre procedure Breathing Pattern: Regular          Post procedure Breathing Pattern: Regular    Heart Rate: Pre procedure Pulse: 79           Post procedure Pulse: 94    Resp Rate: Pre procedure Respirations: 20           Post procedure Respirations: 20         Oxygen: O2 Device: Room air        Changed: NO    SpO2: Pre procedure SpO2: 96 %   without oxygen              Post procedure SpO2: 98 %  without oxygen    Nebulizer Therapy: Current medications Aerosolized Medications: DuoNeb      Changed: NO    Smoking History: never    Problem List:   Patient Active Problem List   Diagnosis Code    Unspecified hereditary and idiopathic peripheral neuropathy G60.9    HBP (high blood pressure) I10    Spinal stenosis, lumbar region, without neurogenic claudication M48.061    Essential and other specified forms of tremor G25.0, G25.2    IBS (irritable bowel syndrome) K58.9    Depression F32.9    Migraine with aura, without mention of intractable migraine without mention of status migrainosus G43. 109    Right knee DJD M17.11    B12 deficiency E53.8    Ataxia R27.0    Foot pain, right M79.671    Fever R50.9    UTI (urinary tract infection) N39.0    Lower abdominal pain R10.30    Type 2 diabetes mellitus with diabetic neuropathy, without long-term current use of insulin (HCC) E11.40    Chronic atrial fibrillation (HCC) E21.9    Systolic CHF, chronic (HCC) I50.22    Cellulitis of left lower leg L03. 116    Anxiety F41.9    Hypercholesterolemia E78.00    Long-term use of high-risk medication Z79.899    Hypokalemia E87.6    Ischemic cardiomyopathy I25.5    CAD (coronary artery disease) I25.10    Epigastric abdominal pain R10.13    S/P placement of cardiac pacemaker Z95.0    Stage 3 chronic kidney disease (HCC) N18.3    Chronic cholecystitis K81.1    Asthma J45.909    Obesity (BMI 30-39. 9) E66.9    Acute asthma exacerbation J45. 0       Respiratory Therapist: Sravani Rice, RT

## 2019-08-20 NOTE — PROGRESS NOTES
ADULT PROTOCOL: JET AEROSOL  REASSESSMENT    Patient  Yossi Hernandez     79 y.o.   female     8/20/2019  7:55 PM    Breath Sounds Pre Procedure: Right Breath Sounds: Clear                               Left Breath Sounds: Clear    Breath Sounds Post Procedure: Right Breath Sounds: Clear                                 Left Breath Sounds: Clear    Breathing pattern: Pre procedure Breathing Pattern: Regular          Post procedure Breathing Pattern: Regular    Heart Rate: Pre procedure Pulse: 84           Post procedure Pulse: 80    Resp Rate: Pre procedure Respirations: 16           Post procedure Respirations: 20      Oxygen: O2 Device: Room air       Changed: NO    SpO2: Pre procedure SpO2: 97 %   Room Air               Post procedure SpO2: 99 %  Room Air       Nebulizer Therapy: Current medications Aerosolized Medications: DuoNeb QID Resp       Changed: Yes, changed to BID       Smoking History: Never Smoked        Problem List:   Patient Active Problem List   Diagnosis Code    Unspecified hereditary and idiopathic peripheral neuropathy G60.9    HBP (high blood pressure) I10    Spinal stenosis, lumbar region, without neurogenic claudication M48.061    Essential and other specified forms of tremor G25.0, G25.2    IBS (irritable bowel syndrome) K58.9    Depression F32.9    Migraine with aura, without mention of intractable migraine without mention of status migrainosus G43. 109    Right knee DJD M17.11    B12 deficiency E53.8    Ataxia R27.0    Foot pain, right M79.671    Fever R50.9    UTI (urinary tract infection) N39.0    Lower abdominal pain R10.30    Type 2 diabetes mellitus with diabetic neuropathy, without long-term current use of insulin (HCC) E11.40    Chronic atrial fibrillation (HCC) M92.4    Systolic CHF, chronic (HCC) I50.22    Cellulitis of left lower leg L03. 116    Anxiety F41.9    Hypercholesterolemia E78.00    Long-term use of high-risk medication Z79.899    Hypokalemia E87.6  Ischemic cardiomyopathy I25.5    CAD (coronary artery disease) I25.10    Epigastric abdominal pain R10.13    S/P placement of cardiac pacemaker Z95.0    Stage 3 chronic kidney disease (HCC) N18.3    Chronic cholecystitis K81.1    Asthma J45.909    Obesity (BMI 30-39. 9) E66.9    Acute asthma exacerbation J45. Viru 65       Respiratory Therapist: Sloan Menchaca, RT

## 2019-08-21 ENCOUNTER — DOCUMENTATION ONLY (OUTPATIENT)
Dept: CASE MANAGEMENT | Age: 69
End: 2019-08-21

## 2019-08-21 VITALS
OXYGEN SATURATION: 96 % | BODY MASS INDEX: 38.99 KG/M2 | SYSTOLIC BLOOD PRESSURE: 152 MMHG | HEIGHT: 69 IN | RESPIRATION RATE: 20 BRPM | WEIGHT: 263.23 LBS | TEMPERATURE: 98.1 F | DIASTOLIC BLOOD PRESSURE: 97 MMHG | HEART RATE: 80 BPM

## 2019-08-21 LAB
ANION GAP SERPL CALC-SCNC: 8 MMOL/L (ref 5–15)
BUN SERPL-MCNC: 32 MG/DL (ref 6–20)
BUN/CREAT SERPL: 22 (ref 12–20)
CALCIUM SERPL-MCNC: 7.4 MG/DL (ref 8.5–10.1)
CHLORIDE SERPL-SCNC: 105 MMOL/L (ref 97–108)
CO2 SERPL-SCNC: 29 MMOL/L (ref 21–32)
CREAT SERPL-MCNC: 1.45 MG/DL (ref 0.55–1.02)
ERYTHROCYTE [DISTWIDTH] IN BLOOD BY AUTOMATED COUNT: 17.3 % (ref 11.5–14.5)
GLUCOSE BLD STRIP.AUTO-MCNC: 130 MG/DL (ref 65–100)
GLUCOSE SERPL-MCNC: 195 MG/DL (ref 65–100)
HCT VFR BLD AUTO: 38 % (ref 35–47)
HGB BLD-MCNC: 11.6 G/DL (ref 11.5–16)
MCH RBC QN AUTO: 27.8 PG (ref 26–34)
MCHC RBC AUTO-ENTMCNC: 30.5 G/DL (ref 30–36.5)
MCV RBC AUTO: 90.9 FL (ref 80–99)
NRBC # BLD: 0 K/UL (ref 0–0.01)
NRBC BLD-RTO: 0 PER 100 WBC
PLATELET # BLD AUTO: 223 K/UL (ref 150–400)
PMV BLD AUTO: 12.2 FL (ref 8.9–12.9)
POTASSIUM SERPL-SCNC: 3.6 MMOL/L (ref 3.5–5.1)
RBC # BLD AUTO: 4.18 M/UL (ref 3.8–5.2)
SERVICE CMNT-IMP: ABNORMAL
SODIUM SERPL-SCNC: 142 MMOL/L (ref 136–145)
WBC # BLD AUTO: 9.4 K/UL (ref 3.6–11)

## 2019-08-21 PROCEDURE — 94640 AIRWAY INHALATION TREATMENT: CPT

## 2019-08-21 PROCEDURE — 80048 BASIC METABOLIC PNL TOTAL CA: CPT

## 2019-08-21 PROCEDURE — 74011636637 HC RX REV CODE- 636/637: Performed by: HOSPITALIST

## 2019-08-21 PROCEDURE — 74011250637 HC RX REV CODE- 250/637: Performed by: INTERNAL MEDICINE

## 2019-08-21 PROCEDURE — 74011000250 HC RX REV CODE- 250: Performed by: INTERNAL MEDICINE

## 2019-08-21 PROCEDURE — 85027 COMPLETE CBC AUTOMATED: CPT

## 2019-08-21 PROCEDURE — 36415 COLL VENOUS BLD VENIPUNCTURE: CPT

## 2019-08-21 PROCEDURE — 82962 GLUCOSE BLOOD TEST: CPT

## 2019-08-21 RX ORDER — PREDNISONE 20 MG/1
40 TABLET ORAL
Qty: 8 TAB | Refills: 0 | Status: SHIPPED | OUTPATIENT
Start: 2019-08-21 | End: 2019-08-27 | Stop reason: ALTCHOICE

## 2019-08-21 RX ADMIN — PREDNISONE 40 MG: 20 TABLET ORAL at 09:06

## 2019-08-21 RX ADMIN — PANTOPRAZOLE SODIUM 40 MG: 40 TABLET, DELAYED RELEASE ORAL at 09:05

## 2019-08-21 RX ADMIN — DABIGATRAN ETEXILATE MESYLATE 150 MG: 150 CAPSULE ORAL at 09:05

## 2019-08-21 RX ADMIN — BUMETANIDE 1 MG: 0.25 INJECTION INTRAMUSCULAR; INTRAVENOUS at 09:05

## 2019-08-21 RX ADMIN — PREGABALIN 200 MG: 150 CAPSULE ORAL at 09:04

## 2019-08-21 RX ADMIN — VENLAFAXINE HYDROCHLORIDE 150 MG: 150 CAPSULE, EXTENDED RELEASE ORAL at 09:05

## 2019-08-21 RX ADMIN — AMLODIPINE BESYLATE 5 MG: 5 TABLET ORAL at 09:05

## 2019-08-21 RX ADMIN — METOPROLOL SUCCINATE 100 MG: 50 TABLET, EXTENDED RELEASE ORAL at 09:04

## 2019-08-21 RX ADMIN — IPRATROPIUM BROMIDE AND ALBUTEROL SULFATE 3 ML: .5; 3 SOLUTION RESPIRATORY (INHALATION) at 08:39

## 2019-08-21 RX ADMIN — FLUTICASONE FUROATE AND VILANTEROL TRIFENATATE 1 PUFF: 100; 25 POWDER RESPIRATORY (INHALATION) at 09:06

## 2019-08-21 RX ADMIN — ASPIRIN 81 MG 81 MG: 81 TABLET ORAL at 09:05

## 2019-08-21 RX ADMIN — LISINOPRIL 40 MG: 20 TABLET ORAL at 09:05

## 2019-08-21 RX ADMIN — Medication 10 ML: at 06:00

## 2019-08-21 RX ADMIN — NYSTATIN: 100000 POWDER TOPICAL at 09:07

## 2019-08-21 NOTE — PROGRESS NOTES
Problem: Falls - Risk of  Goal: *Absence of Falls  Description  Document Galaluischris Yusuf Fall Risk and appropriate interventions in the flowsheet. Outcome: Progressing Towards Goal  Note:   Fall Risk Interventions:  Mobility Interventions: Communicate number of staff needed for ambulation/transfer, Patient to call before getting OOB         Medication Interventions: Assess postural VS orthostatic hypotension, Evaluate medications/consider consulting pharmacy, Patient to call before getting OOB, Teach patient to arise slowly    Elimination Interventions: Bed/chair exit alarm, Call light in reach, Patient to call for help with toileting needs, Stay With Me (per policy), Toileting schedule/hourly rounds              Problem: Patient Education: Go to Patient Education Activity  Goal: Patient/Family Education  Outcome: Progressing Towards Goal     Problem: Pressure Injury - Risk of  Goal: *Prevention of pressure injury  Description  Document Preet Scale and appropriate interventions in the flowsheet. Outcome: Progressing Towards Goal  Note:   Pressure Injury Interventions:  Sensory Interventions: Assess changes in LOC, Avoid rigorous massage over bony prominences, Float heels, Keep linens dry and wrinkle-free    Moisture Interventions: Absorbent underpads, Apply protective barrier, creams and emollients, Assess need for specialty bed    Activity Interventions: Assess need for specialty bed, Chair cushion, Increase time out of bed    Mobility Interventions: Float heels, HOB 30 degrees or less, Turn and reposition approx.  every two hours(pillow and wedges)    Nutrition Interventions: Document food/fluid/supplement intake, Discuss nutritional consult with provider, Offer support with meals,snacks and hydration    Friction and Shear Interventions: Lift team/patient mobility team, Transferring/repositioning devices

## 2019-08-21 NOTE — PROGRESS NOTES
Spiritual Care Assessment/Progress Note  [facility]      NAME: Lynn Durand      MRN: [id number]  AGE: 79 y.o.  SEX: female  Gnosticist Affiliation: Sabianism   Language: English     8/21/2019     Total Time (in minutes): 8     Spiritual Assessment begun in [facility] CARDIOPULMONARY CARE through conversation with:         [x]Patient        [] Family    [] Friend(s)        Reason for Consult: Initial/Spiritual assessment, patient floor     Spiritual beliefs: (Please include comment if needed)     [] Identifies with a kamron tradition:         [] Supported by a kamron community:            [] Claims no spiritual orientation:           [] Seeking spiritual identity:                [] Adheres to an individual form of spirituality:           [x] Not able to assess: did not indicate                         Identified resources for coping:      [] Prayer                               [] Music                  [] Guided Imagery     [x] Family/friends                 [] Pet visits     [] Devotional reading                         [] Unknown     [] Other:                                             Interventions offered during this visit: (See comments for more details)    Patient Interventions: Affirmation of emotions/emotional suffering, Catharsis/review of pertinent events in supportive environment, Initial/Spiritual assessment, patient floor           Plan of Care:     [] Support spiritual and/or cultural needs    [] Support AMD and/or advance care planning process      [] Support grieving process   [] Coordinate Rites and/or Rituals    [] Coordination with community clergy   [x] No spiritual needs identified at this time   [] Detailed Plan of Care below (See Comments)  [] Make referral to Music Therapy  [] Make referral to Pet Therapy     [] Make referral to Addiction services  [] Make referral to [facility]  [] Make referral to Spiritual Care Partner  [] No future visits requested        [] Follow up visits as needed     Comments:    Initial visit on [facility] unit for spiritual assessment. Patient's  was present. Patient expressed happiness that she is being discharged today. She expressed no spiritual needs or concerns. Offered words of hope and encouragement for continued healing.     WARD Dow, [address] [place] [address] Paging Service  [phone])

## 2019-08-21 NOTE — DISCHARGE SUMMARY
Hospitalist Discharge Summary     Patient ID:  Dudley Johansen  [id number]  89 y.o.  1950 8/17/2019    PCP on record: Jolie Rome MD    Admit date: 8/17/2019  Discharge date and time: 8/21/2019    DISCHARGE DIAGNOSIS:    Acute hypoxic respiratory failure   Likely multifactorial from HF and Asthma exacerbation  Acute on chronic systolic HF: s/p Echo 5/01, LVF 21 - 25%  S/p ICD  S/P cardiac cath in 2/2019 shows mild CAD  NICM  Hypokalemia   History of CKD stage III   History of atrial fibrillation with controlled rate   Hypertension, accelerated POA, better controlled  Dyslipidemia   Depression   Obese       CONSULTATIONS:  IP CONSULT TO HOSPITALIST  IP CONSULT TO CARDIOLOGY    Excerpted HPI from H&P of Nyla Lu MD:  This is a 59-year-old female with past medical history of asthma, systolic congestive heart failure exacerbation is coming to the hospital with chief complaints of shortness of breath and also wheezing.  Patient reports being in her usual state of health until about 2 days ago when she started not feeling well.  She reports feeling more short of breath and also wheezing.  She reports using her inhalers with only minimal relief of her symptoms.  She reports mild cough but no phlegm.  She also reports generalized weakness but no focal weakness.  She does not report any chest pressure, palpitations or syncopal episodes.  Denies fever or chills.  Denies abdominal pain, nausea or vomiting.  She also reports mild increased swelling of the legs. On arrival to the hospital she was noted to have uncontrolled hypertension and also desaturated to 80% on room air.  On lab work she was noted to have a normal CBC.  BMP showed a potassium of 3.1 and creatinine 1.45.  She had a chest x-ray which showed evidence of cardiomegaly with interstitial edema.     We were asked to admit for work up and evaluation of the above problems. ______________________________________________________________________  DISCHARGE SUMMARY/HOSPITAL COURSE:  for full details see H&P, daily progress notes, labs, consult notes. Pt admitted for Acute hypoxic respiratory failure that was Likely multifactorial from acute on chronic systolic HF and Asthma exacerbation. Echo 8/17 showed LVF 21 - 25%. Pt has h/o NICM s/p ICD, S/P cardiac cath in 2/2019 shows mild CAD. CXR showed Cardiomegaly with pacemaker and mild interstitial edema, proBNP 5632, treated with IV bumex and steroids, appreciate cardiology consult. Her SOB has resolved, she is ambulatory on RA with cane that is her baseline. Pt is cleared by cardiology to discharge. Pt is hemodynamically stable to go home. Other medical issues    Right sided fungal infection, abdominal fold  -resolved     History of CKD stage III APO  -cr baseline    History of atrial fibrillation with controlled rate POA  Hypertension, accelerated POA, better controlled  -Continue home metoprolol and Pradaxa  -Continue home lisinopril and amlodipine    Dyslipidemia POA  -Continue home atorvastatin    Depression POA  -Continue home Effexor    _______________________________________________________________________  Patient seen and examined by me on discharge day. Pertinent Findings:  Gen:    Not in distress  Chest: Clear lungs  CVS:   Regular rhythm. No edema  Abd:  Soft, not distended, not tender  Neuro:  Alert, CN 2-12 grossly intact  _______________________________________________________________________  DISCHARGE MEDICATIONS:   Current Discharge Medication List      CONTINUE these medications which have CHANGED    Details   predniSONE (DELTASONE) 20 mg tablet Take 40 mg by mouth daily (with breakfast). Qty: 8 Tab, Refills: 0         CONTINUE these medications which have NOT CHANGED    Details   budesonide-formoterol (SYMBICORT) 160-4.5 mcg/actuation HFAA Take 2 Puffs by inhalation two (2) times a day.   Qty: 1 Inhaler, Refills: 0    Associated Diagnoses: Cough      clonazePAM (KLONOPIN) 0.5 mg tablet TAKE 1 TABLET EVERY NIGHT. MAX DAILY DOSE OF 0.5MG. Qty: 90 Tab, Refills: 1    Associated Diagnoses: Anxiety      potassium chloride SR (KLOR-CON 10) 10 mEq tablet TAKE 1 TABLET EVERY DAY  Qty: 90 Tab, Refills: 0      lisinopril (PRINIVIL, ZESTRIL) 40 mg tablet Take 40 mg by mouth daily. diphenhydrAMINE (BENADRYL) 25 mg capsule Take 25 mg by mouth two (2) times a day. nystatin (MYCOSTATIN) topical cream Apply  to affected area two (2) times daily as needed for Skin Irritation (Rash). traMADol (ULTRAM) 50 mg tablet Take 50 mg by mouth daily as needed for Pain (Used to supplement the Lyrica when lyrica doesnt manage the pain on its own). dabigatran etexilate (PRADAXA) 150 mg capsule Take 1 Cap by mouth two (2) times a day. IF NO BLEEDING AT CATH SITE. Qty: 60 Cap, Refills: 12      bumetanide (BUMEX) 2 mg tablet Take 1 Tab by mouth daily. Qty: 90 Tab, Refills: 3      amLODIPine (NORVASC) 2.5 mg tablet Take 1 Tab by mouth daily. Qty: 90 Tab, Refills: 3      omeprazole (PRILOSEC) 40 mg capsule Take 1 Cap by mouth daily. Qty: 90 Cap, Refills: 3      metoprolol succinate (TOPROL-XL) 100 mg tablet Take 1 Tab by mouth daily. Qty: 30 Tab, Refills: prn      acetaminophen (TYLENOL EXTRA STRENGTH) 500 mg tablet Take 1,000 mg by mouth every eight (8) hours as needed for Pain (Headache). lidocaine (ASPERCREME, LIDOCAINE,) 4 % topical cream Apply  to affected area daily as needed for Pain (Knee pain). sodium chloride (AYR SALINE) 0.65 % nasal squeeze bottle 2 Sprays by Both Nostrils route every eight (8) hours as needed. conjugated estrogens (PREMARIN) 0.625 mg/gram vaginal cream Insert 0.5 g into vagina daily as needed. Tuesdays and Thursdays       venlafaxine-SR (EFFEXOR XR) 150 mg capsule Take 150 mg by mouth two (2) times daily (with meals).       pregabalin (LYRICA) 200 mg capsule Take 200 mg by mouth two (2) times a day. cholecalciferol, vitamin d3, (VITAMIN D3) 400 unit cap Take 400 Units by mouth daily. aspirin 81 mg chewable tablet Take 81 mg by mouth daily. Associated Diagnoses: Unspecified hereditary and idiopathic peripheral neuropathy; Essential and other specified forms of tremor; Migraine with aura, without mention of intractable migraine without mention of status migrainosus; Type II or unspecified type diabetes mellitus with neurological manifestations, not stated as uncontrolled(250.60) ([address] B12 deficiency; Ataxia; Spinal stenosis, lumbar region, without neurogenic claudication; HBP (high blood pressure); Polyneuropathy in diabetes(357.2); Type II or unspecified type diabetes mellitus with neurological manifestations, not stated as uncontrolled(250.60) ([address] Polyneuropathy in diabetes(357.2)      atorvastatin (LIPITOR) 40 mg tablet Take 1 Tab by mouth nightly. Qty: 30 Tab, Refills: 12      albuterol (PROVENTIL HFA, VENTOLIN HFA, PROAIR HFA) 90 mcg/actuation inhaler Take 1 Puff by inhalation every four (4) hours as needed for Wheezing. Qty: 1 Inhaler, Refills: 6               Patient Follow Up Instructions: Activity: Activity as tolerated  Diet: Cardiac Diet  Wound Care: None needed    Follow-up with PCP, cardiology, pulmonology  Follow-up tests/labs none    Follow-up Information     Follow up With Specialties Details Why Contact Info    Byron Henry MD Internal Medicine Go on 8/27/2019 For hospital follow up appointment at 10:30AM  [address] [place] [address]  [phone]      [facility], In-Home Clinical Assessments Go on 8/22/2019 Expect a phone call from [place] to schedule a follow up visit with you in 24-48 hours. If you have questions please Contact #[phone] Visit at [facility] [address] [facility]. [facility]  [address] Logan Rodriguez MD Cardiology In 2 weeks Dr. Becka Penn nurse will call you directly to schedule a follow-up appointment.   [address]  [address]  [facility] [address] [phone]          ________________________________________________________________    Risk of deterioration: High    Condition at Discharge:  Stable  __________________________________________________________________    Disposition  Home with family, no needs    ____________________________________________________________________    Code Status: Full Code  ___________________________________________________________________      Total time in minutes spent coordinating this discharge (includes going over instructions, follow-up, prescriptions, and preparing report for sign off to her PCP) :  40 minutes    Signed:  Vidhya Mccabe MD

## 2019-08-21 NOTE — PROGRESS NOTES
Pharmacist Discharge Medication Reconciliation    Significant PMH:   Past Medical History:   Diagnosis Date    Anxiety 1/22/2018    Arthritis     OA    Asthma     Diabetes ([facility]     Essential hypertension     GERD (gastroesophageal reflux disease)     Hypercholesterolemia 1/22/2018    Hypertension     Ill-defined condition     diverticulitis    Long-term use of high-risk medication 1/22/2018    Neuropathy     Obesity (BMI 30-39.9) 4/30/2019    Other ill-defined conditions(799.89)     IBS, spinal stenosis    Plantar fasciitis     Psychiatric disorder     depression, anxiety    Sleep apnea     uses CPAP    Type 2 diabetes mellitus with diabetic neuropathy, without long-term current use of insulin ([facility] 6/5/2016     Chief Complaint for this Admission:   Chief Complaint   Patient presents with    Shortness of Breath     Allergies: Sulfa (sulfonamide antibiotics) and Amoxicillin    Discharge Medications:   Discharge Medication List as of 8/21/2019 10:59 AM        CONTINUE these medications which have CHANGED    Details   predniSONE (DELTASONE) 20 mg tablet Take 40 mg by mouth daily (with breakfast). , Normal, Disp-8 Tab, R-0           CONTINUE these medications which have NOT CHANGED    Details   budesonide-formoterol (SYMBICORT) 160-4.5 mcg/actuation HFAA Take 2 Puffs by inhalation two (2) times a day., Normal, Disp-1 Inhaler, R-0      clonazePAM (KLONOPIN) 0.5 mg tablet TAKE 1 TABLET EVERY NIGHT. MAX DAILY DOSE OF 0.5MG. , Print, Disp-90 Tab, R-1      potassium chloride SR (KLOR-CON 10) 10 mEq tablet TAKE 1 TABLET EVERY DAY, Normal, Disp-90 Tab, R-0      lisinopril (PRINIVIL, ZESTRIL) 40 mg tablet Take 40 mg by mouth daily. , Historical Med      diphenhydrAMINE (BENADRYL) 25 mg capsule Take 25 mg by mouth two (2) times a day., Historical Med      nystatin (MYCOSTATIN) topical cream Apply  to affected area two (2) times daily as needed for Skin Irritation (Rash). , Historical Med      traMADol (ULTRAM) 50 mg tablet Take 50 mg by mouth daily as needed for Pain (Used to supplement the Lyrica when lyrica doesnt manage the pain on its own). , Historical Med      dabigatran etexilate (PRADAXA) 150 mg capsule Take 1 Cap by mouth two (2) times a day. IF NO BLEEDING AT CATH SITE., No Print, Disp-60 Cap, R-12      bumetanide (BUMEX) 2 mg tablet Take 1 Tab by mouth daily. , Normal, Disp-90 Tab, R-3      amLODIPine (NORVASC) 2.5 mg tablet Take 1 Tab by mouth daily. , Normal, Disp-90 Tab, R-3      omeprazole (PRILOSEC) 40 mg capsule Take 1 Cap by mouth daily. , Normal, Disp-90 Cap, R-3      metoprolol succinate (TOPROL-XL) 100 mg tablet Take 1 Tab by mouth daily. , Normal, Disp-30 Tab, R-prn      acetaminophen (TYLENOL EXTRA STRENGTH) 500 mg tablet Take 1,000 mg by mouth every eight (8) hours as needed for Pain (Headache). , Historical Med      lidocaine (ASPERCREME, LIDOCAINE,) 4 % topical cream Apply  to affected area daily as needed for Pain (Knee pain). , Historical Med      sodium chloride (AYR SALINE) 0.65 % nasal squeeze bottle 2 Sprays by Both Nostrils route every eight (8) hours as needed., Historical Med      conjugated estrogens (PREMARIN) 0.625 mg/gram vaginal cream Insert 0.5 g into vagina daily as needed. Tuesdays and Thursdays , Historical Med      venlafaxine-SR (EFFEXOR XR) 150 mg capsule Take 150 mg by mouth two (2) times daily (with meals). , Historical Med      pregabalin (LYRICA) 200 mg capsule Take 200 mg by mouth two (2) times a day., Historical Med      cholecalciferol, vitamin d3, (VITAMIN D3) 400 unit cap Take 400 Units by mouth daily. , Historical Med      aspirin 81 mg chewable tablet Take 81 mg by mouth daily. , Historical Med      atorvastatin (LIPITOR) 40 mg tablet Take 1 Tab by mouth nightly., Normal, Disp-30 Tab, R-12      albuterol (PROVENTIL HFA, VENTOLIN HFA, PROAIR HFA) 90 mcg/actuation inhaler Take 1 Puff by inhalation every four (4) hours as needed for Wheezing., Normal, Disp-1 Inhaler, R-6             The patient's chart, MAR and AVS were reviewed by Rob Nash PHARMD.    Discharging Provider: Dr. My Amador

## 2019-08-21 NOTE — PROGRESS NOTES
Bedside shift change report GIVEN TO DIAN Dykes. Report included the following information SBAR, Kardex and MAR. SIGNIFICANT CHANGES DURING SHIFT:        CONCERNS TO ADDRESS WITH MD:            [facility] NURSING NOTE   Admission Date 8/17/2019   Admission Diagnosis Acute asthma exacerbation [J45.901]   Consults IP CONSULT TO HOSPITALIST  IP CONSULT TO CARDIOLOGY      Cardiac Monitoring [x] Yes [] No      Purposeful Hourly Rounding [x] Yes    Ananya Score Total Score: 2   Ananya score 3 or > [x] Bed Alarm [] Avasys [] 1:1 sitter [] Patient refused (Signed refusal form in chart)   Preet Score Preet Score: 18   Preet score 14 or < [] PMT consult [] Wound Care consult    []  Specialty bed  [] Nutrition consult      Influenza Vaccine Received Flu Vaccine for Current Season (usually Sept-March): Not Flu Season           Oxygen needs? [x] Room air Oxygen @  []1L    []2L    []3L   []4L    []5L   []6L via  NC   Chronic home O2 use? [] Yes [x] No  Perform O2 challenge test and document in progress note using smartphrase (.Homeoxygen)      Last bowel movement Last Bowel Movement Date: 08/19/19      Urinary Catheter             LDAs               Peripheral IV 08/17/19 Right Antecubital (Active)   Site Assessment Clean, dry, & intact 8/20/2019  7:53 PM   Phlebitis Assessment 0 8/20/2019  7:53 PM   Infiltration Assessment 0 8/20/2019  7:53 PM   Dressing Status Clean, dry, & intact 8/20/2019  7:53 PM   Dressing Type Transparent 8/20/2019  7:53 PM   Hub Color/Line Status Pink;Capped 8/20/2019  7:53 PM   Alcohol Cap Used No 8/19/2019  6:25 AM                         Readmission Risk Assessment Tool Score High Risk            35       Total Score        3 Has Seen PCP in Last 6 Months (Yes=3, No=0)    2 . Living with Significant Other. Assisted Living. LTAC. SNF. or   Rehab    4 IP Visits Last 12 Months (1-3=4, 4=9, >4=11)    5 Pt.  Coverage (Medicare=5 , Medicaid, or Self-Pay=4)    21 Charlson Comorbidity Score (Age + Comorbid Conditions)        Criteria that do not apply:    Patient Length of Stay (>5 days = 3)       Expected Length of Stay 4d 2h   Actual Length of Stay 3

## 2019-08-21 NOTE — PROGRESS NOTES
VISHNU plan: Pt will discharge home today, transported by her  in a personal vehicle. Pt will receive a [facility] visit on 8/21/19. Pt is scheduled to follow-up with her PCP and her Cardiologist's office will call her directly to schedule. Pt has not post-acute therapy needs at this time. In Basket message sent to Ambulatory [facility] Castellano to inform of VISHNU plan. No further concerns indicated at this time. AVS updated. Medicare pt has received, reviewed, and signed 2nd IM letter informing them of their right to appeal the discharge. Signed copy has been placed on pt bedside chart. Care Management Interventions  PCP Verified by CM: Yes  Last Visit to PCP: 06/20/19  Mode of Transport at Discharge: Other (see comment)()  Transition of Care Consult (CM Consult):  Other([facility] and f/u PCP/Cardio appts)  MyChart Signup: No  Discharge Durable Medical Equipment: No  Physical Therapy Consult: Yes  Occupational Therapy Consult: Yes  Speech Therapy Consult: No  Current Support Network: Lives with Spouse, Own Home, Family Lives Nearby  Confirm Follow Up Transport: Family  Plan discussed with Pt/Family/Caregiver: Yes  Discharge Location  Discharge Placement: Home with family assistance    COLTEN Romo  Care Manager  [phone]

## 2019-08-21 NOTE — PROGRESS NOTES
Follow up with PCP Brigido Adams 8/27/19 @ 10:30 and cardiology NP Dat Shepard 8/29/19 @ 2:30  On AVS and Hug team notified.

## 2019-08-21 NOTE — PROGRESS NOTES
Transitional Care Team: Initial HUG Note    Date of Assessment: 08/21/19  Time of Assessment:  10:56 AM    Ping Rodriguez is a 79 y.o. female inpatient at  [place] [facility]. Assessment & Plan   Pt A+O. Denies chest pian, dyspnea. Remains with +2 lower extremity edema. Verbalizes understanding reduced sodium diet, importance of daily weights. Aware of her follow up appointments including a [facility] visit. Primary Diagnosis:heart failure    Advance Directive:  POST document on file. Current Code Status:  POST    Referral to Hospice/ Palliative Care Appropriate: possible. May discuss with PCP for out patient palliative care if appropriate. Awareness of Medical Conditions: (Trajectory of illness and pts expectations). Aware her HF is progressing, but anticipates good quality of life for now    Discharge Needs: (to include safety issues) none    Barriers Identified: none  Patient is willing to go to SNF/Inpatient Rehab if recommended: not needed    Medication Review:  Was performed. Can patient afford medications:  yes. Patient is Compliant with Medication regimen:yes    Who manages medications at home: self    Best Patient Contact Number: [phone]    HUG (Healthy Understanding of Goals) program introduced to patient/family. The Transitional Care Team bridges the gaps in care and education surrounding discharge from the acute care facility. The objective is to empower the patient and family in taking a proactive role in preventing readmission within the first thirty days after discharge. The team is also involved in the efforts to reduce readmission to the acute care setting after stabilization and discharge from the acute care environment either to skilled nursing facilities or community. DAHIANA RN/NP-reviewedHUG Calendar/ follow up appointments/ Ambulatory Nurse Navigator name and contact .     Future Appointments   Date Time Provider Tammy Perales   8/27/2019 10:30 AM MD JARRETT Coto [facility] SCHED   10/29/2019  9:00 AM Johnna Walls MD [facility] SCHED   12/3/2019  1:00 PM CHAIR 2 [address] REG       Non-[facility] follow up appointment(s): on AVS    [facility]: call information given to pt      Patient education focused on readmission zones as described as: The Red Zone: High risk for readmission, days 1-21  The Yellow Zone: Moderate  risk for readmission, days 22-29   The Green Zone: Lower risk for readmission, days                30 and after    The [facility] Team will attempt to follow the patient from a distance while inpatient as well as be available for further transition/disposition needs. The [facility] team will continue to offer support during the 30- 90 day discharge from acute care setting. Will notify Ambulatory {Blank Single Select Template:20061[de-identified] \"VISHNU   RN.     Past Medical History:   Diagnosis Date    Anxiety 1/22/2018    Arthritis     OA    Asthma     Diabetes ([address]     Essential hypertension     GERD (gastroesophageal reflux disease)     Hypercholesterolemia 1/22/2018    Hypertension     Ill-defined condition     diverticulitis    Long-term use of high-risk medication 1/22/2018    Neuropathy     Obesity (BMI 30-39.9) 4/30/2019    Other ill-defined conditions(799.89)     IBS, spinal stenosis    Plantar fasciitis     Psychiatric disorder     depression, anxiety    Sleep apnea     uses CPAP    Type 2 diabetes mellitus with diabetic neuropathy, without long-term current use of insulin ([address] 6/5/2016

## 2019-08-21 NOTE — PROGRESS NOTES
Pt d/c home. I reviewed all d/c instructions, follow up appts, and medications. Pt stated understanding. Removed all IV/tele.  Pt left with all personal belongings (CPAP) and Rxs were sent to pharmacy

## 2019-08-21 NOTE — DISCHARGE INSTRUCTIONS
Patient Education        Learning About Asthma  What is asthma? Asthma is a long-term condition that affects your breathing. It causes the airways that lead to the lungs to swell. People with asthma may have asthma attacks. During an asthma attack, the airways tighten and become narrower. This makes it hard to breathe, and you may wheeze or cough. If you have a bad asthma attack, you may need emergency care. Asthma affects people in different ways. Some people only have asthma attacks during allergy season, or when they breathe in cold air, or when they exercise. Others have many bad attacks that send them to the doctor often. What are the symptoms? Symptoms of asthma can be mild or severe. You may have mild attacks now and then, you may have severe symptoms every day, or you may have something in between. How often you have symptoms can also change. When you have asthma, you may:  · Wheeze, making a loud or soft whistling noise when you breathe in and out. · Cough a lot. · Feel tightness in your chest.  · Feel short of breath. · Have trouble sleeping because of coughing or having a hard time breathing. · Get tired quickly during exercise. Your symptoms may be worse at night. How can you prevent asthma attacks? Certain things can make asthma symptoms worse. These are called triggers. When you are around a trigger, an asthma attack is more likely. Common triggers include:  · Cigarette smoke or air pollution. · Things you are allergic to, such as:  ? Pollen, mold, or dust mites. ? Pet hair, skin, or saliva. · Illnesses, like colds, flu, or pneumonia. · Exercise. · Dry, cold air. Here are some ways to avoid a few common triggers:  · Do not smoke or allow others to smoke around you. If you need help quitting, talk to your doctor about stop-smoking programs and medicines. These can increase your chances of quitting for good.   · If there is a lot of pollution, pollen, or dust outside, stay at home and keep your windows closed. Use an air conditioner or air filter in your home. Check your local weather report or newspaper for air quality and pollen reports. · Get the flu vaccine every year. Talk to your doctor about getting a pneumococcal shot. Wash your hands often to prevent infections. · Avoid exercising outdoors in cold weather. If you are outdoors in cold weather, wear a scarf around your face and breathe through your nose. How is asthma treated? There are two parts to treating asthma, which are outlined in your asthma action plan. The goals are to:  · Control asthma over the long term. The asthma action plan tells you which medicine you may need to take every day. This is called a controller medicine. It helps to reduce the swelling of the airways and prevent asthma attacks. · Treat asthma attacks when they occur. The asthma action plan tells you what to do when you have an asthma attack. It helps you identify triggers that can cause your attacks. You use quick-relief medicine during an attack. The asthma plan also helps you track your symptoms and know how well the treatment is working. Follow-up care is a key part of your treatment and safety. Be sure to make and go to all appointments, and call your doctor if you are having problems. It's also a good idea to know your test results and keep a list of the medicines you take. Where can you learn more? Go to http://dawood-vera.info/. Enter [id number] in the search box to learn more about \"Learning About Asthma. \"  Current as of: September 5, 2018  Content Version: 12.1  © 1741-3641 [employer]. Care instructions adapted under license by [employer] (which disclaims liability or warranty for this information).  If you have questions about a medical condition or this instruction, always ask your healthcare professional. [place] [address] any warranty or liability for your use of this information.

## 2019-08-22 ENCOUNTER — PATIENT OUTREACH (OUTPATIENT)
Dept: FAMILY MEDICINE CLINIC | Age: 69
End: 2019-08-22

## 2019-08-22 NOTE — PROGRESS NOTES
Hospital Discharge Follow-Up      Date/Time:  8/22/2019 10:13 AM    Patient was admitted to [facility] on 8/17/19 and discharged on 8/21/19 for hypoxic resp failure. The physician discharge summary was available at the time of outreach. Patient was contacted within 1 business days of discharge. Inpatient RRAT score: 28  Was this a readmission? no   Patient stated reason for the readmission: [address] orders at discharge: [address]: n/a  Date of initial visit: Shane Jane ordered/company: none  Durable Medical Equipment received: n/a      Medication(s):   New Medications at Discharge:  predniSONE (DELTASONE) 20 mg tablet Take 40 mg by mouth daily (with breakfast). Qty: 8 Tab, Refills: 0     Changed Medications at Discharge: none  Discontinued Medications at Discharge: none    Referral to Pharm D needed: no     Current Outpatient Medications   Medication Sig    predniSONE (DELTASONE) 20 mg tablet Take 40 mg by mouth daily (with breakfast).  albuterol (PROVENTIL HFA, VENTOLIN HFA, PROAIR HFA) 90 mcg/actuation inhaler Take 1 Puff by inhalation every four (4) hours as needed for Wheezing.  budesonide-formoterol (SYMBICORT) 160-4.5 mcg/actuation HFAA Take 2 Puffs by inhalation two (2) times a day.  clonazePAM (KLONOPIN) 0.5 mg tablet TAKE 1 TABLET EVERY NIGHT. MAX DAILY DOSE OF 0.5MG.  potassium chloride SR (KLOR-CON 10) 10 mEq tablet TAKE 1 TABLET EVERY DAY    lisinopril (PRINIVIL, ZESTRIL) 40 mg tablet Take 40 mg by mouth daily.  diphenhydrAMINE (BENADRYL) 25 mg capsule Take 25 mg by mouth two (2) times a day.  nystatin (MYCOSTATIN) topical cream Apply  to affected area two (2) times daily as needed for Skin Irritation (Rash).  traMADol (ULTRAM) 50 mg tablet Take 50 mg by mouth daily as needed for Pain (Used to supplement the Lyrica when lyrica doesnt manage the pain on its own).     dabigatran etexilate (PRADAXA) 150 mg capsule Take 1 Cap by mouth two (2) times a day. IF NO BLEEDING AT CATH SITE.  bumetanide (BUMEX) 2 mg tablet Take 1 Tab by mouth daily.  amLODIPine (NORVASC) 2.5 mg tablet Take 1 Tab by mouth daily.  omeprazole (PRILOSEC) 40 mg capsule Take 1 Cap by mouth daily.  metoprolol succinate (TOPROL-XL) 100 mg tablet Take 1 Tab by mouth daily.  acetaminophen (TYLENOL EXTRA STRENGTH) 500 mg tablet Take 1,000 mg by mouth every eight (8) hours as needed for Pain (Headache).  lidocaine (ASPERCREME, LIDOCAINE,) 4 % topical cream Apply  to affected area daily as needed for Pain (Knee pain).  sodium chloride (AYR SALINE) 0.65 % nasal squeeze bottle 2 Sprays by Both Nostrils route every eight (8) hours as needed.  conjugated estrogens (PREMARIN) 0.625 mg/gram vaginal cream Insert 0.5 g into vagina daily as needed. Tuesdays and Thursdays     venlafaxine-SR (EFFEXOR XR) 150 mg capsule Take 150 mg by mouth two (2) times daily (with meals).  pregabalin (LYRICA) 200 mg capsule Take 200 mg by mouth two (2) times a day.  cholecalciferol, vitamin d3, (VITAMIN D3) 400 unit cap Take 400 Units by mouth daily.  aspirin 81 mg chewable tablet Take 81 mg by mouth daily.  atorvastatin (LIPITOR) 40 mg tablet Take 1 Tab by mouth nightly. No current facility-administered medications for this visit. There are no discontinued medications. [facility] follow up appointment(s):   Future Appointments   Date Time Provider Tammy Perales   8/27/2019 10:30 AM Jolie Rome MD [facility]   10/29/2019  9:00 AM Jolie Rome MD [address] [facility]   12/3/2019  1:00 PM CHAIR 2 [address] REG      Non-[facility] follow up appointment(s): [facility]:  scheduled       Goals      Attend follow up appointments on schedule      Reduce risk of CHF exacerbations and complications.

## 2019-08-27 ENCOUNTER — OFFICE VISIT (OUTPATIENT)
Dept: INTERNAL MEDICINE CLINIC | Age: 69
End: 2019-08-27

## 2019-08-27 VITALS
TEMPERATURE: 98.3 F | OXYGEN SATURATION: 93 % | SYSTOLIC BLOOD PRESSURE: 124 MMHG | HEART RATE: 80 BPM | BODY MASS INDEX: 38.36 KG/M2 | WEIGHT: 259 LBS | HEIGHT: 69 IN | DIASTOLIC BLOOD PRESSURE: 80 MMHG | RESPIRATION RATE: 22 BRPM

## 2019-08-27 DIAGNOSIS — I25.5 ISCHEMIC CARDIOMYOPATHY: Chronic | ICD-10-CM

## 2019-08-27 DIAGNOSIS — N18.30 STAGE 3 CHRONIC KIDNEY DISEASE (HCC): ICD-10-CM

## 2019-08-27 DIAGNOSIS — J96.01 ACUTE RESPIRATORY FAILURE WITH HYPOXEMIA (HCC): Primary | ICD-10-CM

## 2019-08-27 DIAGNOSIS — J45.21 MILD INTERMITTENT ASTHMA WITH ACUTE EXACERBATION: ICD-10-CM

## 2019-08-27 DIAGNOSIS — I50.23 ACUTE ON CHRONIC SYSTOLIC CHF (CONGESTIVE HEART FAILURE) (HCC): ICD-10-CM

## 2019-08-27 DIAGNOSIS — I25.118 CORONARY ARTERY DISEASE OF NATIVE HEART WITH STABLE ANGINA PECTORIS, UNSPECIFIED VESSEL OR LESION TYPE (HCC): ICD-10-CM

## 2019-08-27 DIAGNOSIS — I48.20 CHRONIC ATRIAL FIBRILLATION (HCC): Chronic | ICD-10-CM

## 2019-08-27 LAB
A-G RATIO,AGRAT: 1.5 RATIO
ALBUMIN SERPL-MCNC: 3.7 G/DL (ref 3.9–5.4)
ALP SERPL-CCNC: 123 U/L (ref 38–126)
ALT SERPL-CCNC: 23 U/L (ref 0–35)
ANION GAP SERPL CALC-SCNC: 10 MMOL/L
AST SERPL W P-5'-P-CCNC: 22 U/L (ref 14–36)
BILIRUB SERPL-MCNC: 1 MG/DL (ref 0.2–1.3)
BUN SERPL-MCNC: 28 MG/DL (ref 7–17)
BUN/CREATININE RATIO,BUCR: 20 RATIO
CALCIUM SERPL-MCNC: 8.7 MG/DL (ref 8.4–10.2)
CHLORIDE SERPL-SCNC: 102 MMOL/L (ref 98–107)
CO2 SERPL-SCNC: 30 MMOL/L (ref 22–32)
CREAT SERPL-MCNC: 1.4 MG/DL (ref 0.7–1.2)
GLOBULIN,GLOB: 2.5
GLUCOSE SERPL-MCNC: 147 MG/DL (ref 65–105)
POTASSIUM SERPL-SCNC: 4.4 MMOL/L (ref 3.6–5)
PROT SERPL-MCNC: 6.2 G/DL (ref 6.3–8.2)
SODIUM SERPL-SCNC: 142 MMOL/L (ref 137–145)

## 2019-08-27 NOTE — PROGRESS NOTES
Evelyn Sanchez is a 79 y.o. female presenting for Transitions Of Care  . 1. Have you been to the ER, urgent care clinic since your last visit? Hospitalized since your last visit? Patient was admitted to [facility] on 8/17/19 and discharged on 8/21/19 for hypoxic resp failure    2. Have you seen or consulted any other health care providers outside of the [address] [place] [address] [place] since your last visit? Include any pap smears or colon screening. No    Fall Risk Assessment, last 12 mths 1/31/2019   Able to walk? Yes   Fall in past 12 months? Yes   Fall with injury? No   Number of falls in past 12 months 1   Fall Risk Score 1         Abuse Screening Questionnaire 1/31/2019   Do you ever feel afraid of your partner? N   Are you in a relationship with someone who physically or mentally threatens you? N   Is it safe for you to go home? Y       3 most recent PHQ Screens 1/31/2019   Little interest or pleasure in doing things Not at all   Feeling down, depressed, irritable, or hopeless Not at all   Total Score PHQ 2 0   Trouble falling or staying asleep, or sleeping too much Not at all   Feeling tired or having little energy More than half the days   Poor appetite, weight loss, or overeating Not at all   Feeling bad about yourself - or that you are a failure or have let yourself or your family down Not at all   Trouble concentrating on things such as school, work, reading, or watching TV Not at all   Moving or speaking so slowly that other people could have noticed; or the opposite being so fidgety that others notice Not at all   Thoughts of being better off dead, or hurting yourself in some way Not at all   PHQ 9 Score 2   How difficult have these problems made it for you to do your work, take care of your home and get along with others Not difficult at all       There are no discontinued medications.

## 2019-08-27 NOTE — PATIENT INSTRUCTIONS
Heart Failure: Care Instructions  Your Care Instructions    Heart failure occurs when your heart does not pump as much blood as the body needs. Failure does not mean that the heart has stopped pumping but rather that it is not pumping as well as it should. Over time, this causes fluid buildup in your lungs and other parts of your body. Fluid buildup can cause shortness of breath, fatigue, swollen ankles, and other problems. By taking medicines regularly, reducing sodium (salt) in your diet, checking your weight every day, and making lifestyle changes, you can feel better and live longer. Follow-up care is a key part of your treatment and safety. Be sure to make and go to all appointments, and call your doctor if you are having problems. It's also a good idea to know your test results and keep a list of the medicines you take. How can you care for yourself at home? Medicines    · Be safe with medicines. Take your medicines exactly as prescribed. Call your doctor if you think you are having a problem with your medicine.     · Do not take any vitamins, over-the-counter medicine, or herbal products without talking to your doctor first. Earlis Alu not take ibuprofen (Advil or Motrin) and naproxen (Aleve) without talking to your doctor first. They could make your heart failure worse.     · You may be taking some of the following medicine. ? Beta-blockers can slow heart rate, decrease blood pressure, and improve your condition. Taking a beta-blocker may lower your chance of needing to be hospitalized. ? Angiotensin-converting enzyme inhibitors (ACEIs) reduce the heart's workload, lower blood pressure, and reduce swelling. Taking an ACEI may lower your chance of needing to be hospitalized again. ? Angiotensin II receptor blockers (ARBs) work like ACEIs. Your doctor may prescribe them instead of ACEIs. ? Diuretics, also called water pills, reduce swelling. ?  Potassium supplements replace this important mineral, which is sometimes lost with diuretics. ? Aspirin and other blood thinners prevent blood clots, which can cause a stroke or heart attack.    You will get more details on the specific medicines your doctor prescribes. Diet    · Your doctor may suggest that you limit sodium to 2,000 milligrams (mg) a day or less. That is less than 1 teaspoon of salt a day, including all the salt you eat in cooking or in packaged foods. People get most of their sodium from processed foods. Fast food and restaurant meals also tend to be very high in sodium.     · Ask your doctor how much liquid you can drink each day. You may have to limit liquids.    Weight    · Weigh yourself without clothing at the same time each day. Record your weight. Call your doctor if you have a sudden weight gain, such as more than 2 to 3 pounds in a day or 5 pounds in a week. (Your doctor may suggest a different range of weight gain.) A sudden weight gain may mean that your heart failure is getting worse.    Activity level    · Start light exercise (if your doctor says it is okay). Even if you can only do a small amount, exercise will help you get stronger, have more energy, and manage your weight and your stress. Walking is an easy way to get exercise. Start out by walking a little more than you did before. Bit by bit, increase the amount you walk.     · When you exercise, watch for signs that your heart is working too hard. You are pushing yourself too hard if you cannot talk while you are exercising. If you become short of breath or dizzy or have chest pain, stop, sit down, and rest.     · If you feel \"wiped out\" the day after you exercise, walk slower or for a shorter distance until you can work up to a better pace.     · Get enough rest at night. Sleeping with 1 or 2 pillows under your upper body and head may help you breathe easier.    Lifestyle changes    · Do not smoke. Smoking can make a heart condition worse.  If you need help quitting, talk to your doctor about stop-smoking programs and medicines. These can increase your chances of quitting for good. Quitting smoking may be the most important step you can take to protect your heart.     · Limit alcohol to 2 drinks a day for men and 1 drink a day for women. Too much alcohol can cause health problems.     · Avoid getting sick from colds and the flu. Get a pneumococcal vaccine shot. If you have had one before, ask your doctor whether you need another dose. Get a flu shot each year. If you must be around people with colds or the flu, wash your hands often. When should you call for help? Call 911 if you have symptoms of sudden heart failure such as:    · You have severe trouble breathing.     · You cough up pink, foamy mucus.     · You have a new irregular or rapid heartbeat.    Call your doctor now or seek immediate medical care if:    · You have new or increased shortness of breath.     · You are dizzy or lightheaded, or you feel like you may faint.     · You have sudden weight gain, such as more than 2 to 3 pounds in a day or 5 pounds in a week. (Your doctor may suggest a different range of weight gain.)     · You have increased swelling in your legs, ankles, or feet.     · You are suddenly so tired or weak that you cannot do your usual activities.    Watch closely for changes in your health, and be sure to contact your doctor if you develop new symptoms. Where can you learn more? Go to http://dawood-vera.info/. Enter L148 in the search box to learn more about \"Heart Failure: Care Instructions. \"  Current as of: July 22, 2018  Content Version: 12.1  © 3319-6668 [employer], [employer]orporated. Care instructions adapted under license by [employer] (which disclaims liability or warranty for this information).  If you have questions about a medical condition or this instruction, always ask your healthcare professional. [place] [address] any warranty or liability for your use of this information. Heart Failure: Care Instructions  Your Care Instructions    Heart failure occurs when your heart does not pump as much blood as the body needs. Failure does not mean that the heart has stopped pumping but rather that it is not pumping as well as it should. Over time, this causes fluid buildup in your lungs and other parts of your body. Fluid buildup can cause shortness of breath, fatigue, swollen ankles, and other problems. By taking medicines regularly, reducing sodium (salt) in your diet, checking your weight every day, and making lifestyle changes, you can feel better and live longer. Follow-up care is a key part of your treatment and safety. Be sure to make and go to all appointments, and call your doctor if you are having problems. It's also a good idea to know your test results and keep a list of the medicines you take. How can you care for yourself at home? Medicines    · Be safe with medicines. Take your medicines exactly as prescribed. Call your doctor if you think you are having a problem with your medicine.     · Do not take any vitamins, over-the-counter medicine, or herbal products without talking to your doctor first. Millicent Brown not take ibuprofen (Advil or Motrin) and naproxen (Aleve) without talking to your doctor first. They could make your heart failure worse.     · You may be taking some of the following medicine. ? Beta-blockers can slow heart rate, decrease blood pressure, and improve your condition. Taking a beta-blocker may lower your chance of needing to be hospitalized. ? Angiotensin-converting enzyme inhibitors (ACEIs) reduce the heart's workload, lower blood pressure, and reduce swelling. Taking an ACEI may lower your chance of needing to be hospitalized again. ? Angiotensin II receptor blockers (ARBs) work like ACEIs. Your doctor may prescribe them instead of ACEIs. ? Diuretics, also called water pills, reduce swelling. ?  Potassium supplements replace this important mineral, which is sometimes lost with diuretics. ? Aspirin and other blood thinners prevent blood clots, which can cause a stroke or heart attack.    You will get more details on the specific medicines your doctor prescribes. Diet    · Your doctor may suggest that you limit sodium to 2,000 milligrams (mg) a day or less. That is less than 1 teaspoon of salt a day, including all the salt you eat in cooking or in packaged foods. People get most of their sodium from processed foods. Fast food and restaurant meals also tend to be very high in sodium.     · Ask your doctor how much liquid you can drink each day. You may have to limit liquids.    Weight    · Weigh yourself without clothing at the same time each day. Record your weight. Call your doctor if you have a sudden weight gain, such as more than 2 to 3 pounds in a day or 5 pounds in a week. (Your doctor may suggest a different range of weight gain.) A sudden weight gain may mean that your heart failure is getting worse.    Activity level    · Start light exercise (if your doctor says it is okay). Even if you can only do a small amount, exercise will help you get stronger, have more energy, and manage your weight and your stress. Walking is an easy way to get exercise. Start out by walking a little more than you did before. Bit by bit, increase the amount you walk.     · When you exercise, watch for signs that your heart is working too hard. You are pushing yourself too hard if you cannot talk while you are exercising. If you become short of breath or dizzy or have chest pain, stop, sit down, and rest.     · If you feel \"wiped out\" the day after you exercise, walk slower or for a shorter distance until you can work up to a better pace.     · Get enough rest at night. Sleeping with 1 or 2 pillows under your upper body and head may help you breathe easier.    Lifestyle changes    · Do not smoke. Smoking can make a heart condition worse.  If you need help quitting, talk to your doctor about stop-smoking programs and medicines. These can increase your chances of quitting for good. Quitting smoking may be the most important step you can take to protect your heart.     · Limit alcohol to 2 drinks a day for men and 1 drink a day for women. Too much alcohol can cause health problems.     · Avoid getting sick from colds and the flu. Get a pneumococcal vaccine shot. If you have had one before, ask your doctor whether you need another dose. Get a flu shot each year. If you must be around people with colds or the flu, wash your hands often. When should you call for help? Call 911 if you have symptoms of sudden heart failure such as:    · You have severe trouble breathing.     · You cough up pink, foamy mucus.     · You have a new irregular or rapid heartbeat.    Call your doctor now or seek immediate medical care if:    · You have new or increased shortness of breath.     · You are dizzy or lightheaded, or you feel like you may faint.     · You have sudden weight gain, such as more than 2 to 3 pounds in a day or 5 pounds in a week. (Your doctor may suggest a different range of weight gain.)     · You have increased swelling in your legs, ankles, or feet.     · You are suddenly so tired or weak that you cannot do your usual activities.    Watch closely for changes in your health, and be sure to contact your doctor if you develop new symptoms. Where can you learn more? Go to http://dawood-vera.info/. Enter C208 in the search box to learn more about \"Heart Failure: Care Instructions. \"  Current as of: July 22, 2018  Content Version: 12.1  © 8229-6899 [employer]wise, Incorporated. Care instructions adapted under license by [employer] (which disclaims liability or warranty for this information).  If you have questions about a medical condition or this instruction, always ask your healthcare professional. Tunde Matthews any warranty or liability for your use of this information.

## 2019-08-27 NOTE — PROGRESS NOTES
This note will not be viewable in [address] Evelyn Sanchez is a 79 y.o. female and presents with Transitions Of Care  . Subjective:  Mrs. Jose Roberto Acosta presents to the office today and transition of care subsequent to a hospitalization from 8/17 until 8/21 at [facility] when she presented with acute shortness of breath. The patient was admitted with an exacerbation of her asthma, acute systolic congestive heart failure, acute hypoxic respiratory failure and uncontrolled hypertension. Patient had desatted to 80% on room air and had an x-ray which showed evidence of cardiomegaly with interstitial edema. The patient does have coronary artery disease and a history of ischemic cardiomyopathy. In addition she has a history of atrial fibrillation which is followed by cardiology and is controlled with beta-blockers and she is also given blood thinners for stroke prevention. Her proBNP was 5632. Patient was treated with IV Bumex and IV steroids along with jet nebulizer treatments and supplemental oxygen. She was seen by Dr. Shereen Giang for cardiology and she gradually improved. Patient was ambulated and was able to maintain her oxygenation without supplemental O2. Discharge medications were basically the same except that she was placed on tapering prednisone which she finished in the last day or so. The patient notes that her chronic lower extremity edema has been improved with the diuresis that she received in the hospital.  She has had no chest pains, PND, orthopnea, cough or sputum production. She has been compliant with her medications.     Past Medical History:   Diagnosis Date    Anxiety 1/22/2018    Arthritis     OA    Asthma     Diabetes ([address]     Essential hypertension     GERD (gastroesophageal reflux disease)     Hypercholesterolemia 1/22/2018    Hypertension     Ill-defined condition     diverticulitis    Long-term use of high-risk medication 1/22/2018    Neuropathy     Obesity (BMI 30-39.9) 4/30/2019    Other ill-defined conditions(799.89)     IBS, spinal stenosis    Plantar fasciitis     Psychiatric disorder     depression, anxiety    Sleep apnea     uses CPAP    Type 2 diabetes mellitus with diabetic neuropathy, without long-term current use of insulin ([facility] [address] 6/5/2016     Past Surgical History:   Procedure Laterality Date    CARDIAC SURG PROCEDURE UNLIST      stent    COLONOSCOPY N/A 3/14/2018    COLONOSCOPY performed by Ester Chin MD at \[facility]\"" ENDOSCOPY    HX APPENDECTOMY      HX CHOLECYSTECTOMY  11/15/2018    HX HYSTERECTOMY      HX PACEMAKER       Allergies   Allergen Reactions    Sulfa (Sulfonamide Antibiotics) Swelling    Amoxicillin Swelling     Current Outpatient Medications   Medication Sig Dispense Refill    albuterol (PROVENTIL HFA, VENTOLIN HFA, PROAIR HFA) 90 mcg/actuation inhaler Take 1 Puff by inhalation every four (4) hours as needed for Wheezing. 1 Inhaler 6    budesonide-formoterol (SYMBICORT) 160-4.5 mcg/actuation HFAA Take 2 Puffs by inhalation two (2) times a day. 1 Inhaler 0    clonazePAM (KLONOPIN) 0.5 mg tablet TAKE 1 TABLET EVERY NIGHT. MAX DAILY DOSE OF 0.5MG. 90 Tab 1    potassium chloride SR (KLOR-CON 10) 10 mEq tablet TAKE 1 TABLET EVERY DAY 90 Tab 0    lisinopril (PRINIVIL, ZESTRIL) 40 mg tablet Take 40 mg by mouth daily.  diphenhydrAMINE (BENADRYL) 25 mg capsule Take 25 mg by mouth two (2) times a day.  nystatin (MYCOSTATIN) topical cream Apply  to affected area two (2) times daily as needed for Skin Irritation (Rash).  traMADol (ULTRAM) 50 mg tablet Take 50 mg by mouth daily as needed for Pain (Used to supplement the Lyrica when lyrica doesnt manage the pain on its own).  dabigatran etexilate (PRADAXA) 150 mg capsule Take 1 Cap by mouth two (2) times a day. IF NO BLEEDING AT CATH SITE. 60 Cap 12    bumetanide (BUMEX) 2 mg tablet Take 1 Tab by mouth daily.  90 Tab 3    amLODIPine (NORVASC) 2.5 mg tablet Take 1 Tab by mouth daily. 90 Tab 3    omeprazole (PRILOSEC) 40 mg capsule Take 1 Cap by mouth daily. 90 Cap 3    metoprolol succinate (TOPROL-XL) 100 mg tablet Take 1 Tab by mouth daily. 30 Tab prn    acetaminophen (TYLENOL EXTRA STRENGTH) 500 mg tablet Take 1,000 mg by mouth every eight (8) hours as needed for Pain (Headache).  lidocaine (ASPERCREME, LIDOCAINE,) 4 % topical cream Apply  to affected area daily as needed for Pain (Knee pain).  sodium chloride (AYR SALINE) 0.65 % nasal squeeze bottle 2 Sprays by Both Nostrils route every eight (8) hours as needed.  conjugated estrogens (PREMARIN) 0.625 mg/gram vaginal cream Insert 0.5 g into vagina daily as needed. Tuesdays and Thursdays       venlafaxine-SR (EFFEXOR XR) 150 mg capsule Take 150 mg by mouth two (2) times daily (with meals).  pregabalin (LYRICA) 200 mg capsule Take 200 mg by mouth two (2) times a day.  cholecalciferol, vitamin d3, (VITAMIN D3) 400 unit cap Take 400 Units by mouth daily.  aspirin 81 mg chewable tablet Take 81 mg by mouth daily.  atorvastatin (LIPITOR) 40 mg tablet Take 1 Tab by mouth nightly.  27 Tab 12     Social History     Socioeconomic History    Marital status:      Spouse name: Not on file    Number of children: Not on file    Years of education: Not on file    Highest education level: Not on file   Tobacco Use    Smoking status: Never Smoker    Smokeless tobacco: Never Used   Substance and Sexual Activity    Alcohol use: No    Drug use: No     Family History   Problem Relation Age of Onset    Arthritis-osteo Mother     Hypertension Mother     High Cholesterol Mother     Crohn's Disease Mother     Heart Disease Mother     Alcohol abuse Father     High Cholesterol Sister     Hypertension Sister     Thyroid Disease Sister     COPD Sister     High Cholesterol Brother     Hypertension Brother     COPD Brother     COPD Child     Inflammatory Bowel Dz Child        Health Maintenance   Topic Date Due    DTaP/Tdap/Td series (1 - Tdap) 09/13/1973    Shingrix Vaccine Age 50> (1 of 2) 09/13/2002    Pneumococcal 65+ years (1 of 2 - PCV13) 09/13/2017    FOOT EXAM Q1  08/01/2019    Influenza Age 5 to Adult  08/01/2019    HEMOGLOBIN A1C Q6M  10/30/2019    BREAST CANCER SCRN MAMMOGRAM  04/04/2020    GLAUCOMA SCREENING Q2Y  04/18/2020    EYE EXAM RETINAL OR DILATED  04/18/2020    MEDICARE YEARLY EXAM  04/30/2020    MICROALBUMIN Q1  04/30/2020    LIPID PANEL Q1  04/30/2020    COLONOSCOPY  03/14/2028    Hepatitis C Screening  Completed    Bone Densitometry (Dexa) Screening  Completed        Review of Systems  Constitutional: negative for fevers, chills, anorexia and weight loss  Eyes:   negative for visual disturbance and irritation  ENT:   negative for tinnitus,sore throat,nasal congestion,ear pain,hoarseness  Respiratory:  negative for cough, hemoptysis, dyspnea,wheezing  CV:   negative for chest pain, palpitations. Positive for chronic lower extremity edema  GI:   negative for nausea, vomiting, diarrhea, abdominal pain,melena  Endo:               negative for polyuria,polydipsia,polyphagia,heat intolerance  Genitourinary: negative for frequency, dysuria and hematuria  Integumentary: negative for rash and pruritus  Hematologic:  negative for easy bruising and gum/nose bleeding  Musculoskel: negative for myalgias, arthralgias, back pain, muscle weakness, joint pain  Neurological:  negative for headaches, dizziness, vertigo, memory problems and gait   Behavl/Psych: negative for feelings of anxiety, depression, mood changes  ROS otherwise negative      Objective:  Visit Vitals  /80 (BP 1 Location: Right arm, BP Patient Position: Sitting)   Pulse 80   Temp 98.3 °F (36.8 °C) (Oral)   Resp 22   Ht 5' 9\" (1.753 m)   Wt 259 lb (117.5 kg)   SpO2 93%   BMI 38.25 kg/m²     Body mass index is 38.25 kg/m².     Physical Exam:   General appearance - alert, well appearing, and in no distress  Mental status - alert, oriented to person, place, and time  EYE-VIVEK, EOMI,conjunctiva normal bilaterally, lids normal  ENT-ENT exam normal, no neck nodes or sinus tenderness  Nose - normal and patent, no erythema,  Or discharge   Mouth - mucous membranes moist, pharynx normal without lesions  Neck - supple, no significant adenopathy or bruit  Chest - clear to auscultation, no wheezes, rales or rhonchi. Heart - normal rate, regular rhythm, normal S1, S2, no murmurs, rubs, clicks or gallops   Abdomen - soft, nontender, nondistended, no masses or organomegaly  Lymph- no adenopathy palpable  Ext-1+ pretibial edema is present  Skin-Warm and dry. no hyperpigmentation, vitiligo, or suspicious lesions  Neuro -alert, oriented, normal speech, no focal findings or movement disorder noted      Assessment/Plan:  Diagnoses and all orders for this visit:    Acute respiratory failure with hypoxemia (HCC)    Acute on chronic systolic CHF (congestive heart failure) (HCC)  -     CBC WITH AUTOMATED DIFF  -     COLLECTION VENOUS BLOOD,VENIPUNCTURE  -     METABOLIC PANEL, COMPREHENSIVE    Stage 3 chronic kidney disease (HCC)    Mild intermittent asthma with acute exacerbation    Ischemic cardiomyopathy    Coronary artery disease of native heart with stable angina pectoris, unspecified vessel or lesion type ([facility])    Chronic atrial fibrillation ([facility] [address]        Other instructions: The patient's medications were reviewed and reconciled. No change in her current medical regimen is made. A no added salt diet is endorsed. Await results of follow-up labs. Hospital records including labs and imaging studies and progress notes from 8/17 until 8/21 were reviewed during the course of this office visit. The patient does take tramadol, Lyrica, Klonopin chronically and a controlled substance agreement is reviewed and signed by the patient today.     Follow-up as previously scheduled    Follow-up and Dispositions    · Return for As previously scheduled. I have reviewed with the patient details of the assessment and plan and all questions were answered. Relevent patient education was performed. The most recent lab findings were reviewed with the patient. An After Visit Summary was printed and given to the patient.     Alem Escobedo MD

## 2019-08-27 NOTE — LETTER
Name:Lacey Jackson OIQ:[birth year] MR #:[id number] Provider Anshu Acosta MD  
*QUPS-176* BSMG-491 (5/16) Page 1 of 5 Initial [employer] CONTROLLED SUBSTANCE AGREEMENT I may be prescribed medications that are controlled substances as part  of my treatment plan for management of my medical condition(s). The goal of my treatment plan is to maintain and/or improve my health and wellbeing. Because controlled substances have an increased risk of abuse or harm, continual re-evaluation is needed determine if the goals of my treatment plan are being met for my safety and the safety of others. Karen Charly  am entering into this Controlled Substance Agreement with my provider, Zunilda Celaya MD at [address] [place] [address] . I understand that successful treatment requires mutual trust and honesty between me and my provider. I understand that there are state and federal laws and regulations which apply to the medications that my provider may prescribe that must be followed. I understand there are risks and benefits ts of taking the medicines that my provider may prescribe. I understand and agree that following this Agreement is necessary in continuing my provider-patient relationship and success of my treatment plan. As a part of my treatment plan, I agree to the following: COMMUNICATION: 
 
1. I will communicate fully with my provider about my medical condition(s), including the effect on my daily life and how well my medications are helping. I will tell my provider all of the medications that I take for any reason, including medications I receive from another health care provider, and will notify my provider about all issues, problems or concerns, including any side effects, which may be related to my medications.  
 
I understand that this information allows my provider to adjust my treatment plan to help manage my medical condition. I understand that this information will become part of my permanent medical record. 2. I will notify my provider if I have a history of alcohol/drug misuse/addiction or if I have had treatment for alcohol/drug addiction in the past, or if I have a new problem with or concern about alcohol/drug use/addiction, because this increases the likelihood of high risk behaviors and may lead to serious medical conditions. 3. Females Only: I will notify my provider if I am or become pregnant, or if I intend to become pregnant, or if I intend to breastfeed. I understand that communication of these issues with my provider is important, due to possible effects my medication could have on an unborn fetus or breastfeeding child. Name:.Nitza Wall ANIKET:[birth year] MR #:[id number] Provider Ten Mccurdy MD  
*LCIF-451* BSMG-491 (5/16) Page 2 of 5 Initial [employer] MISUSE OF MEDICATIONS / DRUGS: 
 
1. I agree to take all controlled substances as prescribed, and will not misuse or abuse any controlled substances prescribed by my provider. For my safety, I will not increase the amount of medicine I take without first talking with and getting permission from my provider. 2. If I have a medical emergency, another health care provider may prescribe me medication. If I seek emergency treatment, I will notify my provider within seventy-two (72) hours. 3. I understand that my provider may discuss my use and/or possible misuse/abuse of controlled substances and alcohol, as appropriate, with any health care provider involved in my care, pharmacist or legal authority. ILLEGAL DRUGS: 
 
1. I will not use illegal drugs of any kind, including but not limited to marijuana, heroin, cocaine, or any prescription drug which is not prescribed to me.  
 
DRUG DIVERSION / PRESCRIPTION FRAUD: 
 
 1. I will not share, sell, trade, give away, or otherwise misuse my prescriptions or medications. 2. I will not alter any prescriptions provided to me by my provider. SINGLE PROVIDER: 
 
1. I agree that all controlled substances that I take will be prescribed only by my provider (or his/her covering provider) under this Agreement. This agreement does not prevent me from seeking emergency medical treatment or receiving pain management related to a surgery. PROTECTING MEDICATIONS: 
 
1. I am responsible for keeping my prescriptions and medications in a safe and secure place including safeguarding them from loss or theft. I understand that lost, stolen or damaged/destroyed prescriptions or medications will not be replaced. Name:.Nitza Dean RLT:[birth year] MR #:[id number] Provider Maurizio Lobo MD  
*IYJS-534* BSMG-491 (5/16) Page 3 of 5 Initial [employer] PRESCRIPTION RENEWALS/REFILLS: 
 
1. I will follow my controlled substance medication schedule as prescribed by my provider. 2. I understand and agree that I will make any requests for renewals or refills of my prescriptions only at the time of an office visit or during my providers regular office hours subject to the prescription refill requirements of the individual practice. 3. I understand that my provider may not call in prescriptions for controlled substances to my pharmacy. 4. I understand that my provider may adjust or discontinue these medications as deemed appropriate for my medical treatment plan. This Agreement does not guarantee the prescription of controlled medications. 5. I agree that if my medications are adjusted or discontinued, I will properly dispose of any remaining medications. I understand that I will be required to dispose of any remaining controlled medications prior to being provided with any prescriptions for other controlled medications. 6. I understand that the renewal of my prescription depends on my medical condition, my consistent participation, and my adherence with my treatment plan and this Agreement. 7. I understand that if I do not keep an appointment with my provider, I may not receive a renewal or refill for my controlled substance medication. PRESCRIPTION MONITORING / DRUG TESTIN. I understand that my provider may require me to provide urine, saliva or blood for testing at any time. I understand that this testing will be used to monitor for safety and adherence with my treatment plan and this Agreement. 2. I understand that my provider may ask me to provide an observed urine specimen, which means that a nurse or other health care provider may watch me provide urine, and I agree to cooperate if I am asked to provide an observed specimen. 3. I understand that if I do not provide urine, saliva or blood samples within two (2) hours of my providers request, or other timeframe decided by my provider, my treatment plan could be changed, or my prescriptions and medications may be changed or ended. 4. I understand that urine, saliva and blood test results will be a part of my permanent medical record. Name:.Nitza Veliz PCF: MR #:[id number] Provider Reed Ballesteros MD  
*PASH-423* BSMG-491 () Page 4 of 5 Initial SMARTworks 5. I understand that my provider is required to obtain a copy of my State Prescription Monitoring Program () Report at any time in order to safely prescribe medications. 6. I will bring all of my prescribed controlled substance medications in their original bottles to all of my scheduled appointments. 7. I understand that my provider may ask me to come to the practice with all of my prescribed medications for a random pill count at any time. I agree to cooperate if I am asked to come in for a random pill count.  I understand that if I do not arrive in the timeframe decided by my provider, my treatment plan could be changed, or my prescriptions and medications may be changed or ended. COOPERATION WITH INVESTIGATIONS: 
 
1. I authorize my provider and my pharmacy to cooperate fully with any local, state, or federal law enforcement agency in the investigation of any possible misuse, sale, or other diversion of my controlled substance prescriptions or medications. RISKS: 
 
1. I understand that my level of consciousness may be affected from the use of controlled substances, and I understand that there are risks, benefits, effects and potential alternatives (including no treatment) to the medications that my provider has prescribed. 2. I understand that I may become drowsy, tired, dizzy, constipated, and sick to my stomach, or have changes in my mood or in my sleep while taking my medications. I have talked with my provider about these possible side effects, risks, benefits, and alternative treatments, and my provider has answered all of my questions. 3. I understand that I should not suddenly stop taking my medications without first speaking with my provider. I understand that if I suddenly stop taking my medications, I may experience nausea, vomiting, sweating,anxiety, sleeplessness, itching or other uncomfortable feelings. 4. I will not take my medications with alcohol of any kind, including beer, wine or liquor. I understand that drinking alcohol with my medications increases the chances of side effects, including breathing problems or even death. 5. I understand that if I have a history of alcoholism or other drug addiction I may be at increased risk of addiction to my medications. Signs of addiction might include craving, compulsive use, and continued use despite harm.  Since addiction is a disease, I understand my provider may decide to change my medications and refer me to appropriate treatment services. I understand that this information would become part of my permanent medical record. Name:Lacey YEE [place] UJT: MR #:[id number] Provider Ten Mccurdy MD  
*HRFV-659* BSMG-491 () Page 5 of 5 Initial SMARTworks 6. Females only: Children born to women who regularly take controlled substances are likely to have physical problems and suffer withdrawal symptoms at birth. If I am of child-bearing age, I understand that I should use safe and effective birth control while taking any controlled substances to avoid the impact of medications on an unborn fetus or  child. I agree to notify my provider immediately if I should become pregnant so that my treatment plan can be adjusted. 7. Males only: I understand that chronic use of controlled substances has been associated with low testosterone levels in males which may affect my mood, stamina, sexual desire, and general health. I understand that my provider may order the appropriate laboratory test to determine my testosterone level,and I agree to this testing. ADHERENCE: 
 
1. I understand that if I do not adhere to this Agreement in any way, my provider may change my prescriptions, stop prescribing controlled substances or end our provider-patient relationship. 2. If my provider decides to stop prescribing medication, or decides to end our provider-patient relationship,my provider may require that I taper my medications slowly. If necessary, my provider may also provide a prescription for other medications to treat my withdrawal symptoms. UNDERSTANDING THIS AGREEMENT: 
 
I understand that my provider may adjust or stop my prescriptions for controlled substances based on my medical condition and my treatment plan. I understand that this Agreement does not guarantee that I will be prescribed medications or controlled substances. I understand that controlled substances may be just one part of my treatment plan. My initial on each page and my signature below shows that I have read each page of this Agreement, I have had an opportunity to ask questions, and all of my questions have been answered to my satisfaction by my provider. By signing below, I agree to comply with this Agreement, and I understand that if I do not follow the Agreements listed above, my provider may stop 
 
 
 
_________________________________________  Date/Time 8/27/2019 10:20 AM   
             (Patient Signature)

## 2019-08-28 LAB
BASOPHILS # BLD AUTO: 0 X10E3/UL (ref 0–0.2)
BASOPHILS NFR BLD AUTO: 0 %
EOSINOPHIL # BLD AUTO: 0.2 X10E3/UL (ref 0–0.4)
EOSINOPHIL NFR BLD AUTO: 2 %
ERYTHROCYTE [DISTWIDTH] IN BLOOD BY AUTOMATED COUNT: 17.2 % (ref 12.3–15.4)
HCT VFR BLD AUTO: 38.7 % (ref 34–46.6)
HGB BLD-MCNC: 12.2 G/DL (ref 11.1–15.9)
IMM GRANULOCYTES # BLD AUTO: 0.1 X10E3/UL (ref 0–0.1)
IMM GRANULOCYTES NFR BLD AUTO: 1 %
LYMPHOCYTES # BLD AUTO: 1.1 X10E3/UL (ref 0.7–3.1)
LYMPHOCYTES NFR BLD AUTO: 12 %
MCH RBC QN AUTO: 26.5 PG (ref 26.6–33)
MCHC RBC AUTO-ENTMCNC: 31.5 G/DL (ref 31.5–35.7)
MCV RBC AUTO: 84 FL (ref 79–97)
MONOCYTES # BLD AUTO: 0.7 X10E3/UL (ref 0.1–0.9)
MONOCYTES NFR BLD AUTO: 7 %
NEUTROPHILS # BLD AUTO: 7.3 X10E3/UL (ref 1.4–7)
NEUTROPHILS NFR BLD AUTO: 78 %
PLATELET # BLD AUTO: 234 X10E3/UL (ref 150–450)
RBC # BLD AUTO: 4.6 X10E6/UL (ref 3.77–5.28)
WBC # BLD AUTO: 9.4 X10E3/UL (ref 3.4–10.8)

## 2019-08-30 ENCOUNTER — HOSPITAL ENCOUNTER (EMERGENCY)
Age: 69
Discharge: HOME | End: 2019-08-30
Attending: EMERGENCY MEDICINE
Payer: MEDICARE

## 2019-08-30 VITALS
SYSTOLIC BLOOD PRESSURE: 147 MMHG | RESPIRATION RATE: 16 BRPM | WEIGHT: 255.95 LBS | BODY MASS INDEX: 37.91 KG/M2 | DIASTOLIC BLOOD PRESSURE: 77 MMHG | OXYGEN SATURATION: 100 % | HEIGHT: 69 IN | HEART RATE: 84 BPM | TEMPERATURE: 96.6 F

## 2019-08-30 DIAGNOSIS — R04.0 EPISTAXIS: Primary | ICD-10-CM

## 2019-08-30 PROCEDURE — 99283 EMERGENCY DEPT VISIT LOW MDM: CPT

## 2019-08-30 PROCEDURE — 74011000250 HC RX REV CODE- 250: Performed by: EMERGENCY MEDICINE

## 2019-08-30 PROCEDURE — 75810000284 HC CNTRL NASAL HEMORHRAGE SIMPLE

## 2019-08-30 PROCEDURE — 74011250637 HC RX REV CODE- 250/637: Performed by: EMERGENCY MEDICINE

## 2019-08-30 RX ORDER — SILVER NITRATE 38.21; 12.74 MG/1; MG/1
1 STICK TOPICAL
Status: COMPLETED | OUTPATIENT
Start: 2019-08-30 | End: 2019-08-30

## 2019-08-30 RX ORDER — OXYMETAZOLINE HCL 0.05 %
2 SPRAY, NON-AEROSOL (ML) NASAL
Status: DISCONTINUED | OUTPATIENT
Start: 2019-08-30 | End: 2019-08-30 | Stop reason: HOSPADM

## 2019-08-30 RX ADMIN — OXYMETAZOLINE HCL 2 SPRAY: 0.05 SPRAY NASAL at 19:21

## 2019-08-30 RX ADMIN — SILVER NITRATE APPLICATORS 1 APPLICATOR: 25; 75 STICK TOPICAL at 18:46

## 2019-08-30 NOTE — ED PROVIDER NOTES
EMERGENCY DEPARTMENT HISTORY AND PHYSICAL EXAM      Date: 8/30/2019  Patient Name: Odessa Menendez    Please note that this dictation was completed with [employer], the computer voice recognition software. Quite often unanticipated grammatical, syntax, homophones, and other interpretive errors are inadvertently transcribed by the computer software. Please disregard these errors. Please excuse any errors that have escaped final proofreading. History of Presenting Illness     Chief Complaint   Patient presents with    Epistaxis     The patient presents to the ED with complaints of a nose bleed that started today. States that she is currently taking blood thinner. History Provided By: Patient    HPI: Odessa Menendez, 79 y.o. female, presented the emergency department complaining of epistaxis. Bleeding started several hours prior to arrival.  Reports mild spotting on tissue. No heavy bleeding, no clots. No trauma to the nose. No other exacerbating or relieving factors. She does take dabigatran    PCP: Almas Pa MD    No current facility-administered medications on file prior to encounter. Current Outpatient Medications on File Prior to Encounter   Medication Sig Dispense Refill    albuterol (PROVENTIL HFA, VENTOLIN HFA, PROAIR HFA) 90 mcg/actuation inhaler Take 1 Puff by inhalation every four (4) hours as needed for Wheezing. 1 Inhaler 6    budesonide-formoterol (SYMBICORT) 160-4.5 mcg/actuation HFAA Take 2 Puffs by inhalation two (2) times a day. 1 Inhaler 0    clonazePAM (KLONOPIN) 0.5 mg tablet TAKE 1 TABLET EVERY NIGHT. MAX DAILY DOSE OF 0.5MG. 90 Tab 1    potassium chloride SR (KLOR-CON 10) 10 mEq tablet TAKE 1 TABLET EVERY DAY 90 Tab 0    lisinopril (PRINIVIL, ZESTRIL) 40 mg tablet Take 40 mg by mouth daily.  diphenhydrAMINE (BENADRYL) 25 mg capsule Take 25 mg by mouth two (2) times a day.       nystatin (MYCOSTATIN) topical cream Apply  to affected area two (2) times daily as needed for Skin Irritation (Rash).  traMADol (ULTRAM) 50 mg tablet Take 50 mg by mouth daily as needed for Pain (Used to supplement the Lyrica when lyrica doesnt manage the pain on its own).  dabigatran etexilate (PRADAXA) 150 mg capsule Take 1 Cap by mouth two (2) times a day. IF NO BLEEDING AT CATH SITE. 60 Cap 12    bumetanide (BUMEX) 2 mg tablet Take 1 Tab by mouth daily. 90 Tab 3    amLODIPine (NORVASC) 2.5 mg tablet Take 1 Tab by mouth daily. 90 Tab 3    omeprazole (PRILOSEC) 40 mg capsule Take 1 Cap by mouth daily. 90 Cap 3    metoprolol succinate (TOPROL-XL) 100 mg tablet Take 1 Tab by mouth daily. 30 Tab prn    acetaminophen (TYLENOL EXTRA STRENGTH) 500 mg tablet Take 1,000 mg by mouth every eight (8) hours as needed for Pain (Headache).  lidocaine (ASPERCREME, LIDOCAINE,) 4 % topical cream Apply  to affected area daily as needed for Pain (Knee pain).  sodium chloride (AYR SALINE) 0.65 % nasal squeeze bottle 2 Sprays by Both Nostrils route every eight (8) hours as needed.  conjugated estrogens (PREMARIN) 0.625 mg/gram vaginal cream Insert 0.5 g into vagina daily as needed. Tuesdays and Thursdays       venlafaxine-SR (EFFEXOR XR) 150 mg capsule Take 150 mg by mouth two (2) times daily (with meals).  pregabalin (LYRICA) 200 mg capsule Take 200 mg by mouth two (2) times a day.  cholecalciferol, vitamin d3, (VITAMIN D3) 400 unit cap Take 400 Units by mouth daily.  aspirin 81 mg chewable tablet Take 81 mg by mouth daily.  atorvastatin (LIPITOR) 40 mg tablet Take 1 Tab by mouth nightly.  27 Tab 12       Past History     Past Medical History:  Past Medical History:   Diagnosis Date    Anxiety 1/22/2018    Arthritis     OA    Asthma     Diabetes ([address]     Essential hypertension     GERD (gastroesophageal reflux disease)     Hypercholesterolemia 1/22/2018    Hypertension     Ill-defined condition     diverticulitis    Long-term use of high-risk medication 1/22/2018    Neuropathy     Obesity (BMI 30-39.9) 4/30/2019    Other ill-defined conditions(799.89)     IBS, spinal stenosis    Plantar fasciitis     Psychiatric disorder     depression, anxiety    Sleep apnea     uses CPAP    Type 2 diabetes mellitus with diabetic neuropathy, without long-term current use of insulin ([facility] [address] 6/5/2016       Past Surgical History:  Past Surgical History:   Procedure Laterality Date    CARDIAC SURG PROCEDURE UNLIST      stent    COLONOSCOPY N/A 3/14/2018    COLON[facility]COPY performed by Uriah Ford [facility] at [facility] ENDOSCOPY    HX APPENDECTOMY      HX CHOLECYSTECTOMY  11/15/2018    HX HYSTERECTOMY      HX PACEMAKER         Family History:  Family History   Problem Relation Age of Onset    Arthritis-osteo Mother     Hypertension Mother     High Cholesterol Mother     Crohn's Disease Mother     Heart Disease Mother     Alcohol abuse Father     High Cholesterol Sister     Hypertension Sister     Thyroid Disease Sister     COPD Sister     High Cholesterol Brother     Hypertension Brother     COPD Brother     COPD Child     Inflammatory Bowel Dz Child        Social History:  Social History     Tobacco Use    Smoking status: Never Smoker    Smokeless tobacco: Never Used   Substance Use Topics    Alcohol use: No    Drug use: No       Allergies: Allergies   Allergen Reactions    Sulfa (Sulfonamide Antibiotics) Swelling    Amoxicillin Swelling         Review of Systems   Review of Systems   Constitutional: Negative for chills and fever. HENT: Positive for nosebleeds. Negative for congestion and sore throat. Eyes: Negative for visual disturbance. Respiratory: Negative for cough and shortness of breath. Cardiovascular: Negative for chest pain and leg swelling. Gastrointestinal: Negative for abdominal pain, blood in stool, diarrhea and nausea. Endocrine: Negative for polyuria.    Genitourinary: Negative for dysuria, flank pain, vaginal bleeding and vaginal discharge. Musculoskeletal: Negative for myalgias. Skin: Negative for rash. Allergic/Immunologic: Negative for immunocompromised state. Neurological: Negative for weakness and headaches. Psychiatric/Behavioral: Negative for confusion. Physical Exam   Physical Exam   Constitutional: oriented to person, place, and time. appears well-developed and well-nourished. HENT:   Head: Normocephalic and atraumatic. Nose: Scant amount of blood in the left nare. No obvious active source of bleeding. Neck trachea midline   Cardiovascular: Normal peripheral perfusion  Pulmonary/Chest: No respiratory distress, equal chest rise  Abdominal: Nondistended abdomen  Musculoskeletal: No deformity  Neurological: alert and oriented to person, place, and time. Skin: No rash noted. No erythema. Psychiatric: behavior is normal.   Nursing note and vitals reviewed. Diagnostic Study Results     Labs -   No results found for this or any previous visit (from the past 12 hour(s)). Radiologic Studies -   No orders to display     CT Results  (Last 48 hours)    None        CXR Results  (Last 48 hours)    None            Medical Decision Making   I am the first provider for this patient. I reviewed the vital signs, available nursing notes, past medical history, past surgical history, family history and social history. Vital Signs-Reviewed the patient's vital signs. No data found. Records Reviewed: Nursing Notes    Provider Notes (Medical Decision Making):   Initially attended to control bleeding with Afrin, compression. Patient requesting cauterization. Could not see a clear source of bleeding, but there is an area where the bleeding seemed to be localized from in this area was cauterized with silver nitrate. ED Course:   Initial assessment performed. The patients presenting problems have been discussed, and they are in agreement with the care plan formulated and outlined with them.   I have encouraged them to ask questions as they arise throughout their visit. Disposition:  DISCHARGE NOTE  Patients results have been reviewed with them. Patient and/or family have verbally conveyed their understanding and agreement of the patient's signs, symptoms, diagnosis, treatment and prognosis and additionally agree to follow up as recommended or return to the Emergency Room should their condition change or have any new concerns prior to their follow-up appointment. Patient verbally agrees with the care-plan and verbally conveys that all of their questions have been answered. Discharge instructions have also been provided to the patient with some educational information regarding their diagnosis as well a list of reasons why they would want to return to the ER prior to their follow-up appointment should their condition change. PLAN:  1. Discharge Medication List as of 8/30/2019  7:19 PM        2. Follow-up Information     Follow up With Specialties Details Why Contact Info    Karla Yee MD Internal Medicine  As needed [address]  [address] [phone]  [phone]      [address] [place] [address]  Schedule an appointment as soon as possible for a visit  [address] [facility] [address]  [facility] [address]   [address] [id number]  [phone]          Return to ED if worse     Diagnosis     Clinical Impression:   1. Epistaxis        Attestations:   This note was completed by Keyla Vivas DO

## 2019-08-30 NOTE — DISCHARGE INSTRUCTIONS
Patient Education        Nosebleeds: Care Instructions  Your Care Instructions    Nosebleeds are common, especially if you have colds or allergies. Many things can cause a nosebleed. Some nosebleeds stop on their own with pressure. Others need packing. Some get cauterized (sealed). If you have gauze or other packing materials in your nose, you will need to follow up with your doctor to have the packing removed. You may need more treatment if you get nosebleeds a lot. The doctor has checked you carefully, but problems can develop later. If you notice any problems or new symptoms, get medical treatment right away. Follow-up care is a key part of your treatment and safety. Be sure to make and go to all appointments, and call your doctor if you are having problems. It's also a good idea to know your test results and keep a list of the medicines you take. How can you care for yourself at home? · If you get another nosebleed:  ? Sit up and tilt your head slightly forward. This keeps blood from going down your throat. ? Use your thumb and index finger to pinch your nose shut for 10 minutes. Use a clock. Do not check to see if the bleeding has stopped before the 10 minutes are up. If the bleeding has not stopped, pinch your nose shut for another 10 minutes. ? When the bleeding has stopped, try not to pick, rub, or blow your nose for 12 hours. Avoiding these things helps keep your nose from bleeding again. · If your doctor prescribed antibiotics, take them as directed. Do not stop taking them just because you feel better. You need to take the full course of antibiotics. To prevent nosebleeds  · Do not blow your nose too hard. · Try not to lift or strain after a nosebleed. · Raise your head on a pillow while you sleep. · Put a thin layer of a saline- or water-based nasal gel, such as NasoGel, inside your nose. Put it on the septum, which divides your nostrils.  This will prevent dryness that can cause nosebleeds. · Use a vaporizer or humidifier to add moisture to your bedroom. Follow the directions for cleaning the machine. · Do not use aspirin, ibuprofen (Advil, Motrin), or naproxen (Aleve) for 36 to 48 hours after a nosebleed unless your doctor tells you to. You can use acetaminophen (Tylenol) for pain relief. · Talk to your doctor about stopping any other medicines you are taking. Some medicines may make you more likely to get a nosebleed. · Do not use cold medicines or nasal sprays without first talking to your doctor. They can make your nose dry. When should you call for help? Call 911 anytime you think you may need emergency care. For example, call if:    · You passed out (lost consciousness).    Call your doctor now or seek immediate medical care if:    · You get another nosebleed and your nose is still bleeding after you have applied pressure 3 times for 10 minutes each time (30 minutes total).     · There is a lot of blood running down the back of your throat even after you pinch your nose and tilt your head forward.     · You have a fever.     · You have sinus pain.    Watch closely for changes in your health, and be sure to contact your doctor if:    · You get nosebleeds often, even if they stop.     · You do not get better as expected. Where can you learn more? Go to http://dawood-vera.info/. Enter S156 in the search box to learn more about \"Nosebleeds: Care Instructions. \"  Current as of: September 23, 2018  Content Version: 12.1  © 2046-0252 [employer]. Care instructions adapted under license by [employer] (which disclaims liability or warranty for this information). If you have questions about a medical condition or this instruction, always ask your healthcare professional. [address] any warranty or liability for your use of this information.

## 2019-08-31 NOTE — ED NOTES
Patient was provided with discharge instructions. Instructions and any medications were reviewed with the patient &/or family by the provider. Questions and concerns addressed by the provider. Patient was ambulatory out of the ED and was escorted by her .

## 2019-09-03 ENCOUNTER — TELEPHONE (OUTPATIENT)
Dept: INTERNAL MEDICINE CLINIC | Age: 69
End: 2019-09-03

## 2019-09-03 DIAGNOSIS — R04.0 EPISTAXIS: Primary | ICD-10-CM

## 2019-09-03 NOTE — TELEPHONE ENCOUNTER
Patient had nose bleed over weekend and went to emergency room and was told she needed to see a specialist   Pt wants to know if you know of a specialist she can go to  Please call and advise

## 2019-09-09 ENCOUNTER — PATIENT OUTREACH (OUTPATIENT)
Dept: FAMILY MEDICINE CLINIC | Age: 69
End: 2019-09-09

## 2019-09-11 RX ORDER — BUMETANIDE 2 MG/1
TABLET ORAL
Qty: 90 TAB | Refills: 3 | Status: SHIPPED | OUTPATIENT
Start: 2019-09-11 | End: 2020-05-31

## 2019-09-11 RX ORDER — OMEPRAZOLE 40 MG/1
CAPSULE, DELAYED RELEASE ORAL
Qty: 90 CAP | Refills: 3 | Status: SHIPPED | OUTPATIENT
Start: 2019-09-11 | End: 2020-05-31

## 2019-09-11 NOTE — TELEPHONE ENCOUNTER
Anaid Never Requested Prescriptions     Pending Prescriptions Disp Refills    potassium chloride SR (KLOR-CON 10) 10 mEq tablet 90 Tab 0       patient stated Dr Cody Luo had her on 10mg of the potassium. Patient went to her kidney Dr and the potassium was increased to 20mg and kept it at 20mg for 30 days and then patient was to go back to 10mg as written by Dr Cody Luo.     Patient is out of the potassium    Call back [phone]

## 2019-09-12 RX ORDER — POTASSIUM CHLORIDE 750 MG/1
TABLET, FILM COATED, EXTENDED RELEASE ORAL
Qty: 90 TAB | Refills: 0 | Status: SHIPPED | OUTPATIENT
Start: 2019-09-12 | End: 2019-09-16 | Stop reason: SDUPTHER

## 2019-09-16 RX ORDER — POTASSIUM CHLORIDE 750 MG/1
TABLET, FILM COATED, EXTENDED RELEASE ORAL
Qty: 90 TAB | Refills: 0 | Status: SHIPPED | OUTPATIENT
Start: 2019-09-16 | End: 2019-11-19 | Stop reason: SDUPTHER

## 2019-09-16 NOTE — TELEPHONE ENCOUNTER
Requested Prescriptions     Pending Prescriptions Disp Refills    potassium chloride SR (KLOR-CON 10) 10 mEq tablet 90 Tab 0     Sig: TAKE 1 TABLET EVERY DAY       Last Refill: 9/12/19  Next Appointment:10/29/19

## 2019-09-23 ENCOUNTER — PATIENT OUTREACH (OUTPATIENT)
Dept: FAMILY MEDICINE CLINIC | Age: 69
End: 2019-09-23

## 2019-09-24 ENCOUNTER — TELEPHONE (OUTPATIENT)
Dept: INTERNAL MEDICINE CLINIC | Age: 69
End: 2019-09-24

## 2019-09-25 ENCOUNTER — TELEPHONE (OUTPATIENT)
Dept: INTERNAL MEDICINE CLINIC | Age: 69
End: 2019-09-25

## 2019-09-25 NOTE — TELEPHONE ENCOUNTER
Patient stated Yue did get the fax     The last office date given was incorrect. [employer] wouldn't except the form because id has to be with in 6 mnths.     Call back [phone]

## 2019-10-01 DIAGNOSIS — R05.9 COUGH: ICD-10-CM

## 2019-10-01 RX ORDER — BUDESONIDE AND FORMOTEROL FUMARATE DIHYDRATE 160; 4.5 UG/1; UG/1
AEROSOL RESPIRATORY (INHALATION)
Qty: 1 INHALER | Refills: 3 | Status: SHIPPED | OUTPATIENT
Start: 2019-10-01

## 2019-10-10 ENCOUNTER — PATIENT OUTREACH (OUTPATIENT)
Dept: FAMILY MEDICINE CLINIC | Age: 69
End: 2019-10-10

## 2019-10-10 VITALS — BODY MASS INDEX: 38.4 KG/M2 | WEIGHT: 260 LBS

## 2019-10-10 NOTE — PROGRESS NOTES
Goals      Attend follow up appointments on schedule      8/22/19 Patient is to have [facility] 8/22/19. PCP appointment on 8/27/19, [facility] is to call patient to schedule. Will monitor for attendance at next week outreach. CLARENCE    9/23/19 Per review of chart patient attended appointment with PCP on 8/27/19. She is currently scheduled to Dr. Alvin Dudley at [address] [place] [address] on 9/26/19. Will monitor for attendance. CLARENCE    10/10/19 Neha with [facility] reports patient attended appointment and weight was 262 at appointment. CLARENCE       Reduce risk of CHF exacerbations and complications. 10/10/19 Patient reports her weight is 260 pounds. Denies SOB, chest pain, fever, increase in pillow use. Endorses compliance with low Na diet and medications. Patient will monitor CHF S&S and report at next week outreach.   CLARENCE

## 2019-10-29 ENCOUNTER — OFFICE VISIT (OUTPATIENT)
Dept: INTERNAL MEDICINE CLINIC | Age: 69
End: 2019-10-29

## 2019-10-29 VITALS
BODY MASS INDEX: 38.66 KG/M2 | OXYGEN SATURATION: 97 % | HEIGHT: 69 IN | WEIGHT: 261 LBS | RESPIRATION RATE: 18 BRPM | HEART RATE: 81 BPM | TEMPERATURE: 98 F | DIASTOLIC BLOOD PRESSURE: 88 MMHG | SYSTOLIC BLOOD PRESSURE: 138 MMHG

## 2019-10-29 DIAGNOSIS — I25.5 ISCHEMIC CARDIOMYOPATHY: Chronic | ICD-10-CM

## 2019-10-29 DIAGNOSIS — F41.9 ANXIETY: Chronic | ICD-10-CM

## 2019-10-29 DIAGNOSIS — E78.00 HYPERCHOLESTEROLEMIA: Chronic | ICD-10-CM

## 2019-10-29 DIAGNOSIS — I50.22 SYSTOLIC CHF, CHRONIC (HCC): Chronic | ICD-10-CM

## 2019-10-29 DIAGNOSIS — E11.40 TYPE 2 DIABETES MELLITUS WITH DIABETIC NEUROPATHY, WITHOUT LONG-TERM CURRENT USE OF INSULIN (HCC): Primary | Chronic | ICD-10-CM

## 2019-10-29 DIAGNOSIS — I10 ESSENTIAL HYPERTENSION: Chronic | ICD-10-CM

## 2019-10-29 DIAGNOSIS — I48.20 CHRONIC ATRIAL FIBRILLATION (HCC): Chronic | ICD-10-CM

## 2019-10-29 DIAGNOSIS — N39.0 URINARY TRACT INFECTION WITHOUT HEMATURIA, SITE UNSPECIFIED: ICD-10-CM

## 2019-10-29 DIAGNOSIS — I25.118 CORONARY ARTERY DISEASE OF NATIVE HEART WITH STABLE ANGINA PECTORIS, UNSPECIFIED VESSEL OR LESION TYPE (HCC): ICD-10-CM

## 2019-10-29 DIAGNOSIS — Z79.899 LONG-TERM USE OF HIGH-RISK MEDICATION: Chronic | ICD-10-CM

## 2019-10-29 DIAGNOSIS — E66.9 OBESITY (BMI 30-39.9): Chronic | ICD-10-CM

## 2019-10-29 LAB
A-G RATIO,AGRAT: 1.3 RATIO
ALBUMIN SERPL-MCNC: 3.9 G/DL (ref 3.9–5.4)
ALP SERPL-CCNC: 120 U/L (ref 38–126)
ALT SERPL-CCNC: 24 U/L (ref 0–35)
ANION GAP SERPL CALC-SCNC: 10 MMOL/L
AST SERPL W P-5'-P-CCNC: 27 U/L (ref 14–36)
BILIRUB SERPL-MCNC: 1 MG/DL (ref 0.2–1.3)
BILIRUB UR QL: NEGATIVE
BUN SERPL-MCNC: 20 MG/DL (ref 7–17)
BUN/CREATININE RATIO,BUCR: 18 RATIO
CALCIUM SERPL-MCNC: 9.4 MG/DL (ref 8.4–10.2)
CHLORIDE SERPL-SCNC: 105 MMOL/L (ref 98–107)
CHOL/HDL RATIO,CHHD: 3 RATIO (ref 0–4)
CHOLEST SERPL-MCNC: 121 MG/DL (ref 0–200)
CK SERPL-CCNC: 65 U/L (ref 30–135)
CLARITY: CLEAR
CO2 SERPL-SCNC: 30 MMOL/L (ref 22–32)
COLOR UR: ABNORMAL
CREAT SERPL-MCNC: 1.1 MG/DL (ref 0.7–1.2)
GLOBULIN,GLOB: 2.9
GLUCOSE 24H UR-MRATE: NEGATIVE G/(24.H)
GLUCOSE SERPL-MCNC: 168 MG/DL (ref 65–105)
HDLC SERPL-MCNC: 42 MG/DL (ref 35–130)
HGB UR QL STRIP: NEGATIVE
KETONES UR QL STRIP.AUTO: NEGATIVE
LDL/HDL RATIO,LDHD: 1 RATIO
LDLC SERPL CALC-MCNC: 48 MG/DL (ref 0–130)
LEUKOCYTE ESTERASE: ABNORMAL
MICROALBUMIN, URINE: 50 MG/L (ref 0–20)
NITRITE UR QL STRIP.AUTO: NEGATIVE
PH UR STRIP: 7 [PH] (ref 5–7)
POTASSIUM SERPL-SCNC: 3.6 MMOL/L (ref 3.6–5)
PROT SERPL-MCNC: 6.8 G/DL (ref 6.3–8.2)
PROT UR STRIP-MCNC: NEGATIVE MG/DL
RBC #/AREA URNS HPF: 0 #/HPF
SODIUM SERPL-SCNC: 145 MMOL/L (ref 137–145)
SP GR UR REFRACTOMETRY: 1.01 (ref 1–1.03)
TRIGL SERPL-MCNC: 154 MG/DL (ref 0–200)
TSH SERPL DL<=0.05 MIU/L-ACNC: 2.54 UIU/ML (ref 0.34–5.6)
UROBILINOGEN UR QL STRIP.AUTO: NEGATIVE
VLDLC SERPL CALC-MCNC: 31 MG/DL
WBC URNS QL MICRO: ABNORMAL #/HPF

## 2019-10-29 NOTE — PROGRESS NOTES
Steph Boss is a 81 y.o. female presenting for Follow Up Chronic Condition (6 mo fu)  . 1. Have you been to the ER, urgent care clinic since your last visit? Hospitalized since your last visit? No    2. Have you seen or consulted any other health care providers outside of the [address] [place] [address] since your last visit? Include any pap smears or colon screening. Cardiologist 10-28-19    Fall Risk Assessment, last 12 mths 1/31/2019   Able to walk? Yes   Fall in past 12 months? Yes   Fall with injury? No   Number of falls in past 12 months 1   Fall Risk Score 1         Abuse Screening Questionnaire 1/31/2019   Do you ever feel afraid of your partner? N   Are you in a relationship with someone who physically or mentally threatens you? N   Is it safe for you to go home? Y       3 most recent PHQ Screens 1/31/2019   Little interest or pleasure in doing things Not at all   Feeling down, depressed, irritable, or hopeless Not at all   Total Score PHQ 2 0   Trouble falling or staying asleep, or sleeping too much Not at all   Feeling tired or having little energy More than half the days   Poor appetite, weight loss, or overeating Not at all   Feeling bad about yourself - or that you are a failure or have let yourself or your family down Not at all   Trouble concentrating on things such as school, work, reading, or watching TV Not at all   Moving or speaking so slowly that other people could have noticed; or the opposite being so fidgety that others notice Not at all   Thoughts of being better off dead, or hurting yourself in some way Not at all   PHQ 9 Score 2   How difficult have these problems made it for you to do your work, take care of your home and get along with others Not difficult at all       There are no discontinued medications.

## 2019-10-29 NOTE — PATIENT INSTRUCTIONS

## 2019-10-29 NOTE — PROGRESS NOTES
This note will not be viewable in [address] Chris Yeboah is a 81 y.o. female and presents with Follow Up Chronic Condition (6 mo fu)  . Subjective:  Mrs. Anna Ray returns to the office today in follow-up of multiple medical problems. The patient has type 2 diabetes mellitus complicated by severe neuropathy. She currently is not taking any diabetic medication. She checks her blood sugars once daily with average blood sugars fasting in the 140 range. She is up-to-date on diabetic retinal eye examination. With her neuropathy has been managed by specialist and she does take tramadol on an as-needed basis along with Lyrica. She has recently had problems with a hammertoe on the left foot which is caused a callus. She has been followed by Dr. Alcon Grant almost on a weekly basis for this and is due to have corrective surgery for this toe in 2 days. The patient denies polyuria, polydipsia or blurred vision. Blood pressure currently is managed with Bumex, lisinopril, amlodipine, Toprol-XL. She denies any orthostatic dizziness, fatigue, palpitations, cough, rash. She denies headaches, numbness, tingling or focal neurological problems. Hypercholesterolemia is currently managed on Lipitor therapy. She denies muscle soreness or GI upset. She does have a history of coronary artery disease complicated by an ischemic cardiomyopathy and chronic systolic congestive heart failure. She does take aspirin on a daily basis. She denies any exertional chest pains, PND, orthopnea. She does have a history of atrial fibrillation but remains in sinus rhythm. She is on Pradaxa for stroke prevention and has had no bleeding issues. Clonazepam is being used for anxiety which she takes on a as needed basis. He continues to work effectively for her and she has had no exacerbation of symptoms.     Past Medical History:   Diagnosis Date    Anxiety 1/22/2018    Arthritis     OA    Asthma     Diabetes ([facility] [address]     Essential hypertension     GERD (gastroesophageal reflux disease)     Hypercholesterolemia 1/22/2018    Hypertension     Ill-defined condition     diverticulitis    Long-term use of high-risk medication 1/22/2018    Neuropathy     Obesity (BMI 30-39.9) 4/30/2019    Other ill-defined conditions(799.89)     IBS, spinal stenosis    Plantar fasciitis     Psychiatric disorder     depression, anxiety    Sleep apnea     uses CPAP    Type 2 diabetes mellitus with diabetic neuropathy, without long-term current use of insulin ([address] 6/5/2016     Past Surgical History:   Procedure Laterality Date    CARDIAC SURG PROCEDURE UNLIST      stent    COLONOSCOPY N/A 3/14/2018    COLONOSCOPY performed by Lindsey Rehman MD at [facility] ENDOSCOPY    HX APPENDECTOMY      HX CHOLECYSTECTOMY  11/15/2018    HX HYSTERECTOMY      HX PACEMAKER       Allergies   Allergen Reactions    Sulfa (Sulfonamide Antibiotics) Swelling    Amoxicillin Swelling     Current Outpatient Medications   Medication Sig Dispense Refill    calcium carb/vit D2/minerals (CALTRATE PLUS PO) Take  by mouth daily.  SYMBICORT 160-4.5 mcg/actuation HFAA INHALE 2 PUFFS BY MOUTH TWICE DAILY 1 Inhaler 3    potassium chloride SR (KLOR-CON 10) 10 mEq tablet TAKE 1 TABLET EVERY DAY 90 Tab 0    bumetanide (BUMEX) 2 mg tablet TAKE 1 TABLET EVERY DAY 90 Tab 3    omeprazole (PRILOSEC) 40 mg capsule TAKE 1 CAPSULE EVERY DAY 90 Cap 3    albuterol (PROVENTIL HFA, VENTOLIN HFA, PROAIR HFA) 90 mcg/actuation inhaler Take 1 Puff by inhalation every four (4) hours as needed for Wheezing. 1 Inhaler 6    clonazePAM (KLONOPIN) 0.5 mg tablet TAKE 1 TABLET EVERY NIGHT. MAX DAILY DOSE OF 0.5MG. 90 Tab 1    lisinopril (PRINIVIL, ZESTRIL) 40 mg tablet Take 40 mg by mouth daily.  diphenhydrAMINE (BENADRYL) 25 mg capsule Take 25 mg by mouth two (2) times a day.       nystatin (MYCOSTATIN) topical cream Apply  to affected area two (2) times daily as needed for Skin Irritation (Rash).  traMADol (ULTRAM) 50 mg tablet Take 50 mg by mouth daily as needed for Pain (Used to supplement the Lyrica when lyrica doesnt manage the pain on its own).  dabigatran etexilate (PRADAXA) 150 mg capsule Take 1 Cap by mouth two (2) times a day. IF NO BLEEDING AT CATH SITE. 60 Cap 12    amLODIPine (NORVASC) 2.5 mg tablet Take 1 Tab by mouth daily. (Patient taking differently: Take 10 mg by mouth daily.) 90 Tab 3    metoprolol succinate (TOPROL-XL) 100 mg tablet Take 1 Tab by mouth daily. 30 Tab prn    acetaminophen (TYLENOL EXTRA STRENGTH) 500 mg tablet Take 1,000 mg by mouth every eight (8) hours as needed for Pain (Headache).  lidocaine (ASPERCREME, LIDOCAINE,) 4 % topical cream Apply  to affected area daily as needed for Pain (Knee pain).  sodium chloride (AYR SALINE) 0.65 % nasal squeeze bottle 2 Sprays by Both Nostrils route every eight (8) hours as needed.  conjugated estrogens (PREMARIN) 0.625 mg/gram vaginal cream Insert 0.5 g into vagina daily as needed. Tuesdays and Thursdays       venlafaxine-SR (EFFEXOR XR) 150 mg capsule Take 150 mg by mouth two (2) times daily (with meals).  pregabalin (LYRICA) 200 mg capsule Take 200 mg by mouth two (2) times a day.  cholecalciferol, vitamin d3, (VITAMIN D3) 400 unit cap Take 400 Units by mouth daily.  aspirin 81 mg chewable tablet Take 81 mg by mouth daily.  atorvastatin (LIPITOR) 40 mg tablet Take 1 Tab by mouth nightly.  27 Tab 12     Social History     Socioeconomic History    Marital status:      Spouse name: Not on file    Number of children: Not on file    Years of education: Not on file    Highest education level: Not on file   Tobacco Use    Smoking status: Never Smoker    Smokeless tobacco: Never Used   Substance and Sexual Activity    Alcohol use: No    Drug use: No     Family History   Problem Relation Age of Onset    Arthritis-osteo Mother     Hypertension Mother    Chuy Ocampo High Cholesterol Mother     Crohn's Disease Mother     Heart Disease Mother     Alcohol abuse Father     High Cholesterol Sister     Hypertension Sister     Thyroid Disease Sister     COPD Sister     High Cholesterol Brother     Hypertension Brother     COPD Brother     COPD Child     Inflammatory Bowel Dz Child        Health Maintenance   Topic Date Due    DTaP/Tdap/Td series (1 - Tdap) 09/13/1973    Shingrix Vaccine Age 50> (1 of 2) 09/13/2002    FOOT EXAM Q1  08/01/2019    HEMOGLOBIN A1C Q6M  10/30/2019    BREAST CANCER SCRN MAMMOGRAM  04/04/2020    GLAUCOMA SCREENING Q2Y  04/18/2020    EYE EXAM RETINAL OR DILATED  04/18/2020    MEDICARE YEARLY EXAM  04/30/2020    LIPID PANEL Q1  04/30/2020    MICROALBUMIN Q1  06/25/2020    Pneumococcal 65+ years (2 of 2 - PPSV23) 10/11/2020    COLONOSCOPY  03/14/2028    Hepatitis C Screening  Completed    Bone Densitometry (Dexa) Screening  Completed    Influenza Age 5 to Adult  Completed        Review of Systems  Constitutional: negative for fevers, chills, anorexia and weight loss  Eyes:   negative for visual disturbance and irritation  ENT:   negative for tinnitus,sore throat,nasal congestion,ear pain,hoarseness  Respiratory:  negative for cough, hemoptysis, dyspnea,wheezing  CV:   negative for chest pain, palpitations.   Positive for chronic lower extremity edema  GI:   negative for nausea, vomiting, diarrhea, abdominal pain,melena  Endo:               negative for polyuria,polydipsia,polyphagia,heat intolerance  Genitourinary: negative for frequency, dysuria and hematuria  Integumentary: negative for rash and pruritus  Hematologic:  negative for easy bruising and gum/nose bleeding  Musculoskel: negative for myalgias, arthralgias, back pain, muscle weakness, joint pain  Neurological:  negative for headaches, dizziness, vertigo, memory problems and gait   Behavl/Psych: negative for feelings of anxiety, depression, mood changes  ROS otherwise negative      Objective:  Visit Vitals  /88 (BP 1 Location: Right arm, BP Patient Position: Sitting)   Pulse 81   Temp 98 °F (36.7 °C) (Oral)   Resp 18   Ht 5' 9\" (1.753 m)   Wt 261 lb (118.4 kg)   SpO2 97%   BMI 38.54 kg/m²     Body mass index is 38.54 kg/m². Physical Exam:   General appearance - alert, well appearing, and in no distress  Mental status - alert, oriented to person, place, and time  EYE-VIVEK, EOMI,conjunctiva normal bilaterally, lids normal  ENT-ENT exam normal, no neck nodes or sinus tenderness  Nose - normal and patent, no erythema,  Or discharge   Mouth - mucous membranes moist, pharynx normal without lesions  Neck - supple, no significant adenopathy or bruit  Chest - clear to auscultation, no wheezes, rales or rhonchi. Heart - normal rate, regular rhythm, normal S1, S2, no murmurs, rubs, clicks or gallops   Abdomen - soft, nontender, nondistended, no masses or organomegaly  Lymph- no adenopathy palpable  Ext-peripheral pulses are diminished and there is 1+ pretibial edema present  Skin-Warm and dry.  no hyperpigmentation, vitiligo, or suspicious lesions  Neuro -alert, oriented, normal speech, no focal findings or movement disorder noted  Diabetic foot exam:     Left Foot:   Visual Exam: Hammertoe of second toe is present with callus present   Pulse DP: 1+ (weak)   Filament test: absent sensation    Vibratory sensation: absent      Right Foot:   Visual Exam: normal    Pulse DP: 1+ (weak)   Filament test: absent sensation    Vibratory sensation: absent        Assessment/Plan:  Diagnoses and all orders for this visit:    Type 2 diabetes mellitus with diabetic neuropathy, without long-term current use of insulin ([facility])  -      DIABETES FOOT EXAM  -     HEMOGLOBIN A1C W/O EAG  -     URINE, MICROALBUMIN, SEMIQUANTITATIVE    Hypercholesterolemia  -     LIPID PANEL  -     TSH 3RD GENERATION    Long-term use of high-risk medication  -     CK    Coronary artery disease of native heart with stable angina pectoris, unspecified vessel or lesion type ([facility] [address]    Ischemic cardiomyopathy    Systolic CHF, chronic (HCC)    Obesity (BMI 30-39. 9)    Essential hypertension  -     CBC WITH AUTOMATED DIFF  -     COLLECTION VENOUS BLOOD,VENIPUNCTURE  -     METABOLIC PANEL, COMPREHENSIVE  -     URINALYSIS W/MICROSCOPIC    Chronic atrial fibrillation    Anxiety        Other instructions: The patient's medications were reviewed and reconciled. No change in her current medical regimen is made. Body mass index is 38.5 and dietary counseling along with printed patient education is given    Continue to check blood sugars once daily    Continued follow-up with Dr. José Luis Deleon regarding hammertoe and callus of the left foot    Await results of multiple labs    Follow-up 6 months    Follow-up and Dispositions    · Return in about 6 months (around 4/29/2020). I have reviewed with the patient details of the assessment and plan and all questions were answered. Relevent patient education was performed. The most recent lab findings were reviewed with the patient. An After Visit Summary was printed and given to the patient.     Pj Leon MD

## 2019-10-30 LAB
BASOPHILS # BLD AUTO: 0 X10E3/UL (ref 0–0.2)
BASOPHILS NFR BLD AUTO: 0 %
EOSINOPHIL # BLD AUTO: 0.3 X10E3/UL (ref 0–0.4)
EOSINOPHIL NFR BLD AUTO: 4 %
ERYTHROCYTE [DISTWIDTH] IN BLOOD BY AUTOMATED COUNT: 16.6 % (ref 12.3–15.4)
HBA1C MFR BLD HPLC: 6.8 % (ref 4–5.7)
HCT VFR BLD AUTO: 37.3 % (ref 34–46.6)
HGB BLD-MCNC: 11.8 G/DL (ref 11.1–15.9)
IMM GRANULOCYTES # BLD AUTO: 0 X10E3/UL (ref 0–0.1)
IMM GRANULOCYTES NFR BLD AUTO: 0 %
LYMPHOCYTES # BLD AUTO: 1.2 X10E3/UL (ref 0.7–3.1)
LYMPHOCYTES NFR BLD AUTO: 17 %
MCH RBC QN AUTO: 27.1 PG (ref 26.6–33)
MCHC RBC AUTO-ENTMCNC: 31.6 G/DL (ref 31.5–35.7)
MCV RBC AUTO: 86 FL (ref 79–97)
MONOCYTES # BLD AUTO: 0.5 X10E3/UL (ref 0.1–0.9)
MONOCYTES NFR BLD AUTO: 7 %
NEUTROPHILS # BLD AUTO: 5.2 X10E3/UL (ref 1.4–7)
NEUTROPHILS NFR BLD AUTO: 72 %
PLATELET # BLD AUTO: 241 X10E3/UL (ref 150–450)
RBC # BLD AUTO: 4.36 X10E6/UL (ref 3.77–5.28)
WBC # BLD AUTO: 7.1 X10E3/UL (ref 3.4–10.8)

## 2019-10-31 LAB
BACTERIA UR CULT: ABNORMAL
BACTERIA UR CULT: ABNORMAL

## 2019-10-31 RX ORDER — LEVOFLOXACIN 250 MG/1
250 TABLET ORAL DAILY
Qty: 7 TAB | Refills: 0 | Status: SHIPPED | OUTPATIENT
Start: 2019-10-31 | End: 2019-12-03

## 2019-10-31 NOTE — TELEPHONE ENCOUNTER
Patient notified             Requested Prescriptions     Pending Prescriptions Disp Refills    levoFLOXacin (LEVAQUIN) 250 mg tablet 7 Tab 0     Sig: Take 1 Tab by mouth daily.

## 2019-10-31 NOTE — TELEPHONE ENCOUNTER
----- Message from Renato Morgan MD sent at 10/31/2019  1:39 PM EDT -----  Labs are stable but she has a urinary tract infection.   Rx Levaquin 250 mg daily #7

## 2019-11-06 ENCOUNTER — TELEPHONE (OUTPATIENT)
Dept: INTERNAL MEDICINE CLINIC | Age: 69
End: 2019-11-06

## 2019-11-06 NOTE — TELEPHONE ENCOUNTER
Patient called wants to speak with Isrrael Clark or [place]. ....... has an appt for a [address] [place], [place] [address], but needs to cancel and doesn't have a number or . Can you please help her with the cancellation.   Thank you

## 2019-11-14 ENCOUNTER — PATIENT OUTREACH (OUTPATIENT)
Dept: FAMILY MEDICINE CLINIC | Age: 69
End: 2019-11-14

## 2019-11-19 RX ORDER — POTASSIUM CHLORIDE 750 MG/1
TABLET, FILM COATED, EXTENDED RELEASE ORAL
Qty: 90 TAB | Refills: 0 | Status: SHIPPED | OUTPATIENT
Start: 2019-11-19 | End: 2019-12-12 | Stop reason: SDUPTHER

## 2019-11-27 ENCOUNTER — HOSPITAL ENCOUNTER (OUTPATIENT)
Dept: CT IMAGING | Age: 69
Discharge: HOME | End: 2019-11-27
Attending: PHYSICAL MEDICINE & REHABILITATION
Payer: MEDICARE

## 2019-11-27 DIAGNOSIS — M54.50 LOW BACK PAIN, UNSPECIFIED BACK PAIN LATERALITY, UNSPECIFIED CHRONICITY, UNSPECIFIED WHETHER SCIATICA PRESENT: ICD-10-CM

## 2019-11-27 PROCEDURE — 72131 CT LUMBAR SPINE W/O DYE: CPT

## 2019-12-02 DIAGNOSIS — F41.9 ANXIETY: ICD-10-CM

## 2019-12-02 RX ORDER — CLONAZEPAM 0.5 MG/1
TABLET ORAL
Qty: 90 TAB | Refills: 1 | Status: SHIPPED | OUTPATIENT
Start: 2019-12-02 | End: 2020-05-22

## 2019-12-02 NOTE — TELEPHONE ENCOUNTER
Pt called to say her earlier voice mail she had said [facility] when she meant to say [facility] please correct. Thank you.

## 2019-12-02 NOTE — TELEPHONE ENCOUNTER
Last Refill: 5/28/19  Last visit:10/29/2019    NOV: 4/30/2020    Requested Prescriptions     Pending Prescriptions Disp Refills    clonazePAM (KLONOPIN) 0.5 mg tablet 90 Tab 1     Sig: TAKE 1 TABLET EVERY NIGHT. MAX DAILY DOSE OF 0.5MG.

## 2019-12-03 ENCOUNTER — TELEPHONE (OUTPATIENT)
Dept: INTERNAL MEDICINE CLINIC | Age: 69
End: 2019-12-03

## 2019-12-03 ENCOUNTER — HOSPITAL ENCOUNTER (OUTPATIENT)
Dept: INFUSION THERAPY | Age: 69
Discharge: HOME | End: 2019-12-03
Payer: MEDICARE

## 2019-12-03 VITALS
SYSTOLIC BLOOD PRESSURE: 130 MMHG | RESPIRATION RATE: 18 BRPM | TEMPERATURE: 97 F | HEART RATE: 80 BPM | DIASTOLIC BLOOD PRESSURE: 83 MMHG

## 2019-12-03 LAB
ALBUMIN SERPL-MCNC: 3.5 G/DL (ref 3.5–5)
ANION GAP BLD CALC-SCNC: 15 MMOL/L (ref 10–20)
ANION GAP SERPL CALC-SCNC: 8 MMOL/L (ref 5–15)
BUN BLD-MCNC: 20 MG/DL (ref 9–20)
BUN SERPL-MCNC: 21 MG/DL (ref 6–20)
BUN/CREAT SERPL: 13 (ref 12–20)
CA-I BLD-MCNC: 1.11 MMOL/L (ref 1.12–1.32)
CALCIUM SERPL-MCNC: 8.6 MG/DL (ref 8.5–10.1)
CHLORIDE BLD-SCNC: 99 MMOL/L (ref 98–107)
CHLORIDE SERPL-SCNC: 103 MMOL/L (ref 97–108)
CO2 BLD-SCNC: 31 MMOL/L (ref 21–32)
CO2 SERPL-SCNC: 30 MMOL/L (ref 21–32)
CREAT BLD-MCNC: 1.5 MG/DL (ref 0.6–1.3)
CREAT SERPL-MCNC: 1.6 MG/DL (ref 0.55–1.02)
GLUCOSE BLD-MCNC: 287 MG/DL (ref 65–100)
GLUCOSE SERPL-MCNC: 278 MG/DL (ref 65–100)
HCT VFR BLD CALC: 34 % (ref 35–47)
MAGNESIUM SERPL-MCNC: 1.5 MG/DL (ref 1.6–2.4)
PHOSPHATE SERPL-MCNC: 2.6 MG/DL (ref 2.6–4.7)
POTASSIUM BLD-SCNC: 2.9 MMOL/L (ref 3.5–5.1)
POTASSIUM SERPL-SCNC: 2.9 MMOL/L (ref 3.5–5.1)
SERVICE CMNT-IMP: ABNORMAL
SODIUM BLD-SCNC: 141 MMOL/L (ref 136–145)
SODIUM SERPL-SCNC: 141 MMOL/L (ref 136–145)

## 2019-12-03 PROCEDURE — 83735 ASSAY OF MAGNESIUM: CPT

## 2019-12-03 PROCEDURE — 80069 RENAL FUNCTION PANEL: CPT

## 2019-12-03 PROCEDURE — 96372 THER/PROPH/DIAG INJ SC/IM: CPT

## 2019-12-03 PROCEDURE — 74011250636 HC RX REV CODE- 250/636: Performed by: INTERNAL MEDICINE

## 2019-12-03 PROCEDURE — 36415 COLL VENOUS BLD VENIPUNCTURE: CPT

## 2019-12-03 PROCEDURE — 80047 BASIC METABLC PNL IONIZED CA: CPT

## 2019-12-03 RX ORDER — LANOLIN ALCOHOL/MO/W.PET/CERES
400 CREAM (GRAM) TOPICAL 2 TIMES DAILY
Qty: 60 TAB | Refills: 0 | Status: SHIPPED | OUTPATIENT
Start: 2019-12-03 | End: 2020-01-02

## 2019-12-03 RX ADMIN — DENOSUMAB 60 MG: 60 INJECTION SUBCUTANEOUS at 14:32

## 2019-12-03 NOTE — TELEPHONE ENCOUNTER
Spoke to patient and notified her of lab results and recommendations. Please send pended Rx in to pharmacy.

## 2019-12-03 NOTE — PROGRESS NOTES
Outpatient Infusion Center Progress Note    1310  Pt admit to [facility] for Q6 month Prolia ambulatory in stable condition. Assessment completed. No new concerns voiced. Labs drawn peripherally as ordered. Visit Vitals  /83   Pulse 80   Temp 97 °F (36.1 °C)   Resp 18   Breastfeeding No     Ionized calcium 1.11, sent to main lab for verification. Recent Results (from the past 12 hour(s))   RENAL FUNCTION PANEL    Collection Time: 12/03/19  1:23 PM   Result Value Ref Range    Sodium 141 136 - 145 mmol/L    Potassium 2.9 (L) 3.5 - 5.1 mmol/L    Chloride 103 97 - 108 mmol/L    CO2 30 21 - 32 mmol/L    Anion gap 8 5 - 15 mmol/L    Glucose 278 (H) 65 - 100 mg/dL    BUN 21 (H) 6 - 20 MG/DL    Creatinine 1.60 (H) 0.55 - 1.02 MG/DL    BUN/Creatinine ratio 13 12 - 20      GFR est AA 39 (L) >60 ml/min/1.73m2    GFR est non-AA 32 (L) >60 ml/min/1.73m2    Calcium 8.6 8.5 - 10.1 MG/DL    Phosphorus 2.6 2.6 - 4.7 MG/DL    Albumin 3.5 3.5 - 5.0 g/dL   MAGNESIUM    Collection Time: 12/03/19  1:23 PM   Result Value Ref Range    Magnesium 1.5 (L) 1.6 - 2.4 mg/dL   POC CHEM8    Collection Time: 12/03/19  1:27 PM   Result Value Ref Range    Calcium, ionized (POC) 1.11 (L) 1.12 - 1.32 mmol/L    Sodium (POC) 141 136 - 145 mmol/L    Potassium (POC) 2.9 (L) 3.5 - 5.1 mmol/L    Chloride (POC) 99 98 - 107 mmol/L    CO2 (POC) 31 21 - 32 mmol/L    Anion gap (POC) 15 10 - 20 mmol/L    Glucose (POC) 287 (H) 65 - 100 mg/dL    BUN (POC) 20 9 - 20 mg/dL    Creatinine (POC) 1.5 (H) 0.6 - 1.3 mg/dL    GFRAA, POC 42 (L) >60 ml/min/1.73m2    GFRNA, POC 35 (L) >60 ml/min/1.73m2    Hematocrit (POC) 34 (L) 35.0 - 47.0 %    Comment Notified RN or MD immediately by            Labs within treatment parameters. Called and spoke with Juany at Dr. Eddie Lind office to notify of potassium of 2.9, pt has potassium 10 meQ tablets at home. Dr. Eddie Lind office to contact patient directly. Pt denies any recent or upcoming dental procedures. Medications:  Prolia SQ in left arm    1435  Pt tolerated treatment well. D/c home ambulatory in no distress. Pt aware of next appointment scheduled for 6/4/19.

## 2019-12-03 NOTE — TELEPHONE ENCOUNTER
Patient seen in infusion center for prolia injection. Nurse Marcus Maria reports patient's potassium is at 2.9. Patient has extra potassium tablets at home if physician wants to adjust her dosage.       # [phone] Benjamin Wilson

## 2019-12-03 NOTE — PROGRESS NOTES
Potassium level is very low and magnesium level is very low. Most likely this is related to the diuretic she is taking.   Increase potassium to 3 times daily and start magnesium oxide 400 mg twice daily  Should recheck potassium level and magnesium level the first of next week

## 2019-12-03 NOTE — TELEPHONE ENCOUNTER
----- Message from Ksenia Larson MD sent at 12/3/2019  2:27 PM EST -----  Potassium level is very low and magnesium level is very low. Most likely this is related to the diuretic she is taking.   Increase potassium to 3 times daily and start magnesium oxide 400 mg twice daily  Should recheck potassium level and magnesium level the first of next week

## 2019-12-09 ENCOUNTER — LAB ONLY (OUTPATIENT)
Dept: INTERNAL MEDICINE CLINIC | Age: 69
End: 2019-12-09

## 2019-12-09 DIAGNOSIS — E87.6 LOW BLOOD POTASSIUM: Primary | ICD-10-CM

## 2019-12-09 DIAGNOSIS — R79.0 LOW MAGNESIUM LEVEL: ICD-10-CM

## 2019-12-09 LAB — POTASSIUM SERPL-SCNC: 4.3 MMOL/L (ref 3.6–5)

## 2019-12-10 LAB — MAGNESIUM SERPL-MCNC: 2 MG/DL (ref 1.6–2.3)

## 2019-12-11 ENCOUNTER — TELEPHONE (OUTPATIENT)
Dept: INTERNAL MEDICINE CLINIC | Age: 69
End: 2019-12-11

## 2019-12-11 NOTE — TELEPHONE ENCOUNTER
Patient's spouse called for clarification of potassium medication. He says patient was told to continue with the medication. She use to take 1/day but was increased to 3/day. Caller says if she continues with 3/day, she does not have enough medication to last and will need a prescription sent into [facility] for 90 day supply.   Please assist.

## 2019-12-12 RX ORDER — POTASSIUM CHLORIDE 750 MG/1
20 TABLET, FILM COATED, EXTENDED RELEASE ORAL 3 TIMES DAILY
Qty: 270 TAB | Refills: 3 | Status: SHIPPED | OUTPATIENT
Start: 2019-12-12 | End: 2020-05-11

## 2019-12-12 NOTE — TELEPHONE ENCOUNTER
Patient notified Rx will be sent in to [facility]  Requested Prescriptions     Pending Prescriptions Disp Refills    potassium chloride SR (KLOR-CON 10) 10 mEq tablet 270 Tab 3     Sig: Take 2 Tabs by mouth three (3) times daily.

## 2019-12-16 ENCOUNTER — TELEPHONE (OUTPATIENT)
Dept: INTERNAL MEDICINE CLINIC | Age: 69
End: 2019-12-16

## 2019-12-17 ENCOUNTER — OFFICE VISIT (OUTPATIENT)
Dept: INTERNAL MEDICINE CLINIC | Age: 69
End: 2019-12-17

## 2019-12-17 VITALS
OXYGEN SATURATION: 98 % | TEMPERATURE: 97.8 F | WEIGHT: 266.6 LBS | RESPIRATION RATE: 22 BRPM | BODY MASS INDEX: 39.49 KG/M2 | HEIGHT: 69 IN | SYSTOLIC BLOOD PRESSURE: 136 MMHG | DIASTOLIC BLOOD PRESSURE: 84 MMHG | HEART RATE: 79 BPM

## 2019-12-17 DIAGNOSIS — L60.0 INGROWN TOENAIL OF LEFT FOOT: ICD-10-CM

## 2019-12-17 DIAGNOSIS — R60.0 LOWER EXTREMITY EDEMA: Primary | ICD-10-CM

## 2019-12-17 DIAGNOSIS — I87.2 VENOUS STASIS DERMATITIS OF BOTH LOWER EXTREMITIES: ICD-10-CM

## 2019-12-17 RX ORDER — HYDRALAZINE HYDROCHLORIDE 25 MG/1
25 TABLET, FILM COATED ORAL 2 TIMES DAILY
COMMUNITY
End: 2020-12-24

## 2019-12-17 RX ORDER — ISOSORBIDE DINITRATE 30 MG/1
30 TABLET ORAL DAILY
COMMUNITY
End: 2020-08-12

## 2019-12-17 RX ORDER — LEVOFLOXACIN 500 MG/1
500 TABLET, FILM COATED ORAL DAILY
Qty: 10 TAB | Refills: 0 | Status: SHIPPED | OUTPATIENT
Start: 2019-12-17 | End: 2019-12-27

## 2019-12-17 NOTE — PROGRESS NOTES
Desire Loyola is a 81 y.o. female presenting for Rash (legs)  . 1. Have you been to the ER, urgent care clinic since your last visit? Hospitalized since your last visit? No    2. Have you seen or consulted any other health care providers outside of the [address] [place] [address] since your last visit? Include any pap smears or colon screening. 12-12-19 Foot Dr, 12-16-19 Ortho    Fall Risk Assessment, last 12 mths 1/31/2019   Able to walk? Yes   Fall in past 12 months? Yes   Fall with injury? No   Number of falls in past 12 months 1   Fall Risk Score 1         Abuse Screening Questionnaire 1/31/2019   Do you ever feel afraid of your partner? N   Are you in a relationship with someone who physically or mentally threatens you? N   Is it safe for you to go home? Y       3 most recent PHQ Screens 1/31/2019   Little interest or pleasure in doing things Not at all   Feeling down, depressed, irritable, or hopeless Not at all   Total Score PHQ 2 0   Trouble falling or staying asleep, or sleeping too much Not at all   Feeling tired or having little energy More than half the days   Poor appetite, weight loss, or overeating Not at all   Feeling bad about yourself - or that you are a failure or have let yourself or your family down Not at all   Trouble concentrating on things such as school, work, reading, or watching TV Not at all   Moving or speaking so slowly that other people could have noticed; or the opposite being so fidgety that others notice Not at all   Thoughts of being better off dead, or hurting yourself in some way Not at all   PHQ 9 Score 2   How difficult have these problems made it for you to do your work, take care of your home and get along with others Not difficult at all       There are no discontinued medications.

## 2019-12-17 NOTE — PROGRESS NOTES
This note will not be viewable in [address] Subjective:     Mrs. Maggi Roque presents to the office today with complaints of bilateral leg swelling and rash. She has had chronic lower extremity edema for which she is on Bumex. Patient is also on  vasodilators for her hypertension. The patient has had problems with stasis dermatitis in the past which is recently worsened. In addition she had her podiatrist recently trim her toenails and she believes that she has an ingrown nail on the left. The patient denies any fevers or chills. She has had no PND or orthopnea and denies shortness of breath. Past Medical History:   Diagnosis Date    Anxiety 1/22/2018    Arthritis     OA    Asthma     Diabetes ([facility] [address]     Essential hypertension     GERD (gastroesophageal reflux disease)     Hypercholesterolemia 1/22/2018    Hypertension     Ill-defined condition     diverticulitis    Long-term use of high-risk medication 1/22/2018    Neuropathy     Obesity (BMI 30-39.9) 4/30/2019    Other ill-defined conditions(799.89)     IBS, spinal stenosis    Plantar fasciitis     Psychiatric disorder     depression, anxiety    Sleep apnea     uses CPAP    Type 2 diabetes mellitus with diabetic neuropathy, without long-term current use of insulin ([facility] [address] 6/5/2016     Past Surgical History:   Procedure Laterality Date    CARDIAC SURG PROCEDURE UNLIST      stent    COLONOSCOPY N/A 3/14/2018    COLONOS[facility] performed by Erwin [facility] at [facility] ENDOSCOPY    HX APPENDECTOMY      HX CHOLECYSTECTOMY  11/15/2018    HX HYSTERECTOMY      HX PACEMAKER       Allergies   Allergen Reactions    Sulfa (Sulfonamide Antibiotics) Swelling    Amoxicillin Swelling     Current Outpatient Medications   Medication Sig Dispense Refill    isosorbide dinitrate (ISORDIL) 30 mg tablet Take 30 mg by mouth daily.  hydrALAZINE (APRESOLINE) 25 mg tablet Take 25 mg by mouth two (2) times a day.       levoFLOXacin (LEVAQUIN) 500 mg tablet Take 1 Tab by mouth daily for 10 days. 10 Tab 0    potassium chloride SR (KLOR-CON 10) 10 mEq tablet Take 2 Tabs by mouth three (3) times daily. 270 Tab 3    magnesium oxide (MAG-OX) 400 mg tablet Take 1 Tab by mouth two (2) times a day. 60 Tab 0    clonazePAM (KLONOPIN) 0.5 mg tablet TAKE 1 TABLET EVERY NIGHT. MAX DAILY DOSE OF 0.5MG. 90 Tab 1    calcium carb/vit D2/minerals (CALTRATE PLUS PO) Take  by mouth daily.  SYMBICORT 160-4.5 mcg/actuation HFAA INHALE 2 PUFFS BY MOUTH TWICE DAILY 1 Inhaler 3    bumetanide (BUMEX) 2 mg tablet TAKE 1 TABLET EVERY DAY 90 Tab 3    omeprazole (PRILOSEC) 40 mg capsule TAKE 1 CAPSULE EVERY DAY 90 Cap 3    albuterol (PROVENTIL HFA, VENTOLIN HFA, PROAIR HFA) 90 mcg/actuation inhaler Take 1 Puff by inhalation every four (4) hours as needed for Wheezing. 1 Inhaler 6    lisinopril (PRINIVIL, ZESTRIL) 40 mg tablet Take 40 mg by mouth daily.  diphenhydrAMINE (BENADRYL) 25 mg capsule Take 25 mg by mouth two (2) times a day.  nystatin (MYCOSTATIN) topical cream Apply  to affected area two (2) times daily as needed for Skin Irritation (Rash).  traMADol (ULTRAM) 50 mg tablet Take 50 mg by mouth daily as needed for Pain (Used to supplement the Lyrica when lyrica doesnt manage the pain on its own).  dabigatran etexilate (PRADAXA) 150 mg capsule Take 1 Cap by mouth two (2) times a day. IF NO BLEEDING AT CATH SITE. 60 Cap 12    amLODIPine (NORVASC) 2.5 mg tablet Take 1 Tab by mouth daily. (Patient taking differently: Take 10 mg by mouth daily.) 90 Tab 3    metoprolol succinate (TOPROL-XL) 100 mg tablet Take 1 Tab by mouth daily. 30 Tab prn    acetaminophen (TYLENOL EXTRA STRENGTH) 500 mg tablet Take 1,000 mg by mouth every eight (8) hours as needed for Pain (Headache).  lidocaine (ASPERCREME, LIDOCAINE,) 4 % topical cream Apply  to affected area daily as needed for Pain (Knee pain).       sodium chloride (AYR SALINE) 0.65 % nasal squeeze bottle 2 Sprays by Both Nostrils route every eight (8) hours as needed.  conjugated estrogens (PREMARIN) 0.625 mg/gram vaginal cream Insert 0.5 g into vagina daily as needed. Tuesdays and Thursdays       venlafaxine-SR (EFFEXOR XR) 150 mg capsule Take 150 mg by mouth two (2) times daily (with meals).  pregabalin (LYRICA) 200 mg capsule Take 200 mg by mouth two (2) times a day.  cholecalciferol, vitamin d3, (VITAMIN D3) 400 unit cap Take 400 Units by mouth daily.  aspirin 81 mg chewable tablet Take 81 mg by mouth daily.  atorvastatin (LIPITOR) 40 mg tablet Take 1 Tab by mouth nightly. 27 Tab 12     Social History     Socioeconomic History    Marital status:      Spouse name: Not on file    Number of children: Not on file    Years of education: Not on file    Highest education level: Not on file   Tobacco Use    Smoking status: Never Smoker    Smokeless tobacco: Never Used   Substance and Sexual Activity    Alcohol use: No    Drug use: No     Family History   Problem Relation Age of Onset    Arthritis-osteo Mother     Hypertension Mother     High Cholesterol Mother     Crohn's Disease Mother     Heart Disease Mother     Alcohol abuse Father     High Cholesterol Sister     Hypertension Sister     Thyroid Disease Sister     COPD Sister     High Cholesterol Brother     Hypertension Brother     COPD Brother     COPD Child     Inflammatory Bowel Dz Child        Review of Systems:  GEN: no weight loss, weight gain, fatigue or night sweats  CV: no PND, orthopnea, or palpitations  Resp: no dyspnea on exertion, no cough  Abd: no nausea, vomiting or diarrhea  EXT: Complains of edema and rash of left greater than right lower extremities.   Also complains of nail pain along the great nail of the left foot  Endocrine: no hair loss, excessive thirst or polyuria  Neurological ROS: no TIA or stroke symptoms  ROS otherwise negative      Objective:     Visit Vitals  /84 (BP 1 Location: Left arm, BP Patient Position: Sitting)   Pulse 79   Temp 97.8 °F (36.6 °C) (Oral)   Resp 22   Ht 5' 9\" (1.753 m)   Wt 266 lb 9.6 oz (120.9 kg)   SpO2 98%   BMI 39.37 kg/m²     Body mass index is 39.37 kg/m². General:   alert, cooperative and no distress   Neck: Trachea midline, no thyromegaly, no bruits   Heart: S1 and S2 normal,no murmurs noted    Lungs: Clear to auscultation bilaterally, no increased work of breathing   Abdomen: Soft, nontender. Normal bowel sounds   Extremities: There is 3-4+ edema of both lower extremities with stasis dermatitis involving the left greater than right anterior shin region. There is a slightly ingrown great toenail along the lateral aspect of the nail. There is no cellulitis of the toe or foot. Neuro: ..alert, oriented x3,speech normal in context and clarity, cranial nerves II-XII intact,motor strength: full proximally and distally,gait: normal  reflexes: full and symmetric     Physical exam otherwise negative         Assessment/Plan:     Diagnoses and all orders for this visit:    Lower extremity edema    Venous stasis dermatitis of both lower extremities    Ingrown toenail of left foot  -     levoFLOXacin (LEVAQUIN) 500 mg tablet; Take 1 Tab by mouth daily for 10 days. , Normal, Disp-10 Tab, R-0        Other instructions:   Patient's medications were reviewed and reconciled. I have asked her to increase her Bumex to twice daily over the next 7 days. Have emphasized how important it is for her to get her legs elevated and to stay off of them. We will place her on Levaquin for her ingrown toenail on the left and should it not improve with antibiotic therapy she will need to revisit her podiatrist.    Follow-up here pending response to the above    Follow-up and Dispositions    · Return if symptoms worsen or fail to improve.          Ksenia Larson MD

## 2019-12-17 NOTE — PATIENT INSTRUCTIONS
Ingrown Toenail: Care Instructions Your Care Instructions An ingrown toenail often occurs because a nail is not trimmed correctly or because shoes are too tight. An ingrown nail can cause an infection. If your toe is infected, your doctor may prescribe antibiotics. Most ingrown toenails can be treated at home. You should trim toenails straight across, so the ends of the nail grow over the skin and not into it. Good nail care can prevent ingrown toenails. Follow-up care is a key part of your treatment and safety. Be sure to make and go to all appointments, and call your doctor if you are having problems. It's also a good idea to know your test results and keep a list of the medicines you take. How can you care for yourself at home? · Trim the nails straight across. Leave the corners a little longer so they do not cut into the skin. To do this when you have an ingrown nail: 
? Soak your foot in warm water for about 15 minutes to soften the nail. ? Wedge a small piece of wet cotton under the corner of the nail to cushion the nail and lift it slightly. This keeps it from cutting the skin. ? Repeat daily until the nail has grown out and can be trimmed. · Do not use manicure scissors to dig under the ingrown nail. You might stab your toe, which could get infected. · Do not trim your toenails too short. · Check with your doctor before trimming your own toenails if you have been diagnosed with diabetes or peripheral arterial disease. These conditions increase the risk of an infection, because you may have decreased sensation in your toes and cut yourself without knowing it. · Wear roomy, comfortable shoes. · If your doctor prescribed antibiotics, take them as directed. Do not stop taking them just because you feel better. You need to take the full course of antibiotics. When should you call for help? Call your doctor now or seek immediate medical care if: 
  · You have signs of infection, such as: ? Increased pain, swelling, warmth, or redness. ? Red streaks leading from the toe. ? Pus draining from the toe. ? A fever.  
 Watch closely for changes in your health, and be sure to contact your doctor if: 
  · You do not get better as expected. Where can you learn more? Go to http://dawood-vera.info/. Enter R135 in the search box to learn more about \"Ingrown Toenail: Care Instructions. \" Current as of: April 1, 2019 Content Version: 12.2 © 0138-7545 [employer]. Care instructions adapted under license by [employer] (which disclaims liability or warranty for this information). If you have questions about a medical condition or this instruction, always ask your healthcare professional. [address] any warranty or liability for your use of this information.

## 2019-12-23 ENCOUNTER — TELEPHONE (OUTPATIENT)
Dept: INTERNAL MEDICINE CLINIC | Age: 69
End: 2019-12-23

## 2019-12-23 NOTE — TELEPHONE ENCOUNTER
Ms Marva Castro takes a potassium pill - Dr Pedro Jimenez changed it from 10 to 30 mg  And when she got her prescription it said to take 6 pills a day - she is confused  Please call and advise

## 2019-12-24 NOTE — TELEPHONE ENCOUNTER
Just wanted to clarify- she is to take potassium 10meq tid? Not 20meq tid, correct? Patient stated the rx sent in on 12-11-19 was incorrect- its supposed to be 10meq tid.

## 2020-03-05 ENCOUNTER — OFFICE VISIT (OUTPATIENT)
Dept: INTERNAL MEDICINE CLINIC | Age: 70
End: 2020-03-05

## 2020-03-05 VITALS
RESPIRATION RATE: 22 BRPM | OXYGEN SATURATION: 88 % | WEIGHT: 276.2 LBS | SYSTOLIC BLOOD PRESSURE: 126 MMHG | HEART RATE: 79 BPM | DIASTOLIC BLOOD PRESSURE: 80 MMHG | TEMPERATURE: 98.8 F | BODY MASS INDEX: 40.91 KG/M2 | HEIGHT: 69 IN

## 2020-03-05 DIAGNOSIS — J01.00 ACUTE MAXILLARY SINUSITIS, RECURRENCE NOT SPECIFIED: Primary | ICD-10-CM

## 2020-03-05 DIAGNOSIS — J45.20 MILD INTERMITTENT ASTHMA WITHOUT COMPLICATION: ICD-10-CM

## 2020-03-05 RX ORDER — CLOTRIMAZOLE AND BETAMETHASONE DIPROPIONATE 10; .64 MG/G; MG/G
CREAM TOPICAL 2 TIMES DAILY
Qty: 15 G | Refills: 0 | Status: SHIPPED | OUTPATIENT
Start: 2020-03-05 | End: 2020-06-22

## 2020-03-05 RX ORDER — TRIAMCINOLONE ACETONIDE 1 MG/G
CREAM TOPICAL 2 TIMES DAILY
Qty: 15 G | Refills: 0 | Status: SHIPPED | OUTPATIENT
Start: 2020-03-05 | End: 2020-07-01 | Stop reason: SDUPTHER

## 2020-03-05 RX ORDER — DOXYCYCLINE HYCLATE 100 MG
100 TABLET ORAL 2 TIMES DAILY
Qty: 20 TAB | Refills: 0 | Status: SHIPPED | OUTPATIENT
Start: 2020-03-05 | End: 2020-03-15

## 2020-03-05 NOTE — PROGRESS NOTES
Norman Hurley is a 81 y.o. female presenting for Cough and Sinus Infection  . 1. Have you been to the ER, urgent care clinic since your last visit? Hospitalized since your last visit? No    2. Have you seen or consulted any other health care providers outside of the [address] [place] [address] since your last visit? Include any pap smears or colon screening. Dr Bruno Brunner Feb 2020    Fall Risk Assessment, last 12 mths 1/31/2019   Able to walk? Yes   Fall in past 12 months? Yes   Fall with injury? No   Number of falls in past 12 months 1   Fall Risk Score 1         Abuse Screening Questionnaire 1/31/2019   Do you ever feel afraid of your partner? N   Are you in a relationship with someone who physically or mentally threatens you? N   Is it safe for you to go home? Y       3 most recent PHQ Screens 1/31/2019   Little interest or pleasure in doing things Not at all   Feeling down, depressed, irritable, or hopeless Not at all   Total Score PHQ 2 0   Trouble falling or staying asleep, or sleeping too much Not at all   Feeling tired or having little energy More than half the days   Poor appetite, weight loss, or overeating Not at all   Feeling bad about yourself - or that you are a failure or have let yourself or your family down Not at all   Trouble concentrating on things such as school, work, reading, or watching TV Not at all   Moving or speaking so slowly that other people could have noticed; or the opposite being so fidgety that others notice Not at all   Thoughts of being better off dead, or hurting yourself in some way Not at all   PHQ 9 Score 2   How difficult have these problems made it for you to do your work, take care of your home and get along with others Not difficult at all       There are no discontinued medications.

## 2020-03-05 NOTE — PROGRESS NOTES
This note will not be viewable in [address] Danny Hodges is a 81 y.o. female and presents with Cough and Sinus Infection  . Subjective:  Mrs. Marie Guardian presents to the office today with complaints of an upper respiratory infection with the development of sinus congestion, maxillary discomfort and purulent sinus drainage. She has been using over-the-counter medication without relief. The postnasal drainage is been causing a cough and occasionally she has noted some wheezing. She does have asthma but there is been no shortness of breath. She denies any fevers, chills, body aches. She has had a mild headache. Patient Active Problem List   Diagnosis Code    Unspecified hereditary and idiopathic peripheral neuropathy G60.9    HBP (high blood pressure) I10    Spinal stenosis, lumbar region, without neurogenic claudication M48.061    Essential and other specified forms of tremor G25.0, G25.2    IBS (irritable bowel syndrome) K58.9    Depression F32.9    Migraine with aura, without mention of intractable migraine without mention of status migrainosus G43. 109    Right knee DJD M17.11    B12 deficiency E53.8    Ataxia R27.0    Foot pain, right M79.671    Fever R50.9    UTI (urinary tract infection) N39.0    Lower abdominal pain R10.30    Type 2 diabetes mellitus with diabetic neuropathy, without long-term current use of insulin (HCC) E11.40    Chronic atrial fibrillation E55.26    Systolic CHF, chronic ([facility]) I50.22    Cellulitis of left lower leg L03. 116    Anxiety F41.9    Hypercholesterolemia E78.00    Long-term use of high-risk medication Z79.899    Hypokalemia E87.6    Ischemic cardiomyopathy I25.5    CAD (coronary artery disease) I25.10    Epigastric abdominal pain R10.13    S/P placement of cardiac pacemaker Z95.0    Stage 3 chronic kidney disease (HCC) N18.3    Chronic cholecystitis K81.1    Asthma J45.909    Obesity (BMI 30-39. 9) E66.9    Acute asthma exacerbation J45.901    Lower extremity edema R60.0    Venous stasis dermatitis of both lower extremities I87.2    Ingrown toenail of left foot L60.0     Past Surgical History:   Procedure Laterality Date    CARDIAC SURG PROCEDURE UNLIST      stent    COLONOSCOPY N/A 3/14/2018    [facility]    HX APPENDECTOMY      HX CHOLECYSTECTOMY  11/15/2018    HX HYSTERECTOMY      HX PACEMAKER       Allergies   Allergen Reactions    Sulfa (Sulfonamide Antibiotics) Swelling    Amoxicillin Swelling     Current Outpatient Medications   Medication Sig Dispense Refill    doxycycline (VIBRA-TABS) 100 mg tablet Take 1 Tab by mouth two (2) times a day for 10 days. 20 Tab 0    triamcinolone acetonide (KENALOG) 0.1 % topical cream Apply  to affected area two (2) times a day. use thin layer 15 g 0    clotrimazole-betamethasone (LOTRISONE) topical cream Apply  to affected area two (2) times a day. 15 g 0    magnesium oxide (MAG-OX) 400 mg tablet TAKE 1 TABLET BY MOUTH TWICE DAILY 60 Tab 5    isosorbide dinitrate (ISORDIL) 30 mg tablet Take 30 mg by mouth daily.  hydrALAZINE (APRESOLINE) 25 mg tablet Take 25 mg by mouth two (2) times a day.  potassium chloride SR (KLOR-CON 10) 10 mEq tablet Take 2 Tabs by mouth three (3) times daily. (Patient taking differently: Take 10 mEq by mouth three (3) times daily.) 270 Tab 3    clonazePAM (KLONOPIN) 0.5 mg tablet TAKE 1 TABLET EVERY NIGHT. MAX DAILY DOSE OF 0.5MG. 90 Tab 1    calcium carb/vit D2/minerals (CALTRATE PLUS PO) Take  by mouth daily.  SYMBICORT 160-4.5 mcg/actuation HFAA INHALE 2 PUFFS BY MOUTH TWICE DAILY 1 Inhaler 3    bumetanide (BUMEX) 2 mg tablet TAKE 1 TABLET EVERY DAY 90 Tab 3    omeprazole (PRILOSEC) 40 mg capsule TAKE 1 CAPSULE EVERY DAY 90 Cap 3    albuterol (PROVENTIL HFA, VENTOLIN HFA, PROAIR HFA) 90 mcg/actuation inhaler Take 1 Puff by inhalation every four (4) hours as needed for Wheezing.  1 Inhaler 6  lisinopril (PRINIVIL, ZESTRIL) 40 mg tablet Take 40 mg by mouth daily.  diphenhydrAMINE (BENADRYL) 25 mg capsule Take 25 mg by mouth two (2) times a day.  nystatin (MYCOSTATIN) topical cream Apply  to affected area two (2) times daily as needed for Skin Irritation (Rash).  traMADol (ULTRAM) 50 mg tablet Take 50 mg by mouth daily as needed for Pain (Used to supplement the Lyrica when lyrica doesnt manage the pain on its own).  dabigatran etexilate (PRADAXA) 150 mg capsule Take 1 Cap by mouth two (2) times a day. IF NO BLEEDING AT CATH SITE. 60 Cap 12    amLODIPine (NORVASC) 2.5 mg tablet Take 1 Tab by mouth daily. (Patient taking differently: Take 10 mg by mouth daily.) 90 Tab 3    metoprolol succinate (TOPROL-XL) 100 mg tablet Take 1 Tab by mouth daily. 30 Tab prn    lidocaine (ASPERCREME, LIDOCAINE,) 4 % topical cream Apply  to affected area daily as needed for Pain (Knee pain).  sodium chloride (AYR SALINE) 0.65 % nasal squeeze bottle 2 Sprays by Both Nostrils route every eight (8) hours as needed.  conjugated estrogens (PREMARIN) 0.625 mg/gram vaginal cream Insert 0.5 g into vagina daily as needed. Tuesdays and Thursdays       venlafaxine-SR (EFFEXOR XR) 150 mg capsule Take 150 mg by mouth two (2) times daily (with meals).  pregabalin (LYRICA) 200 mg capsule Take 200 mg by mouth two (2) times a day.  cholecalciferol, vitamin d3, (VITAMIN D3) 400 unit cap Take 400 Units by mouth daily.  aspirin 81 mg chewable tablet Take 81 mg by mouth daily.  atorvastatin (LIPITOR) 40 mg tablet Take 1 Tab by mouth nightly. 30 Tab 12    acetaminophen (TYLENOL EXTRA STRENGTH) 500 mg tablet Take 1,000 mg by mouth every eight (8) hours as needed for Pain (Headache).        Social History     Socioeconomic History    Marital status:      Spouse name: Not on file    Number of children: Not on file    Years of education: Not on file    Highest education level: Not on file   Tobacco Use    Smoking status: Never Smoker    Smokeless tobacco: Never Used   Substance and Sexual Activity    Alcohol use: No    Drug use: No     Family History   Problem Relation Age of Onset   [facility] Arthritis-osteo Mother     Hypertension Mother     High Cholesterol Mother    [facility] Crohn's Disease Mother     Heart Disease Mother     Alcohol abuse Father     High Cholesterol Sister     Hypertension Sister     Thyroid Disease Sister     COPD Sister     High Cholesterol Brother     Hypertension Brother     COPD Brother     COPD Child     Inflammatory Bowel Dz Child        Review of Systems  Constitutional: negative for fevers, chills, anorexia and weight loss  Eyes:   negative for visual disturbance and irritation  ENT:   Positive for sinus congestion, maxillary discomfort, purulent psotnasal draingage and throat irritation  Respiratory:  Positive for cough due to postnasal drainage with occasional wheeze  CV:   negative for chest pain, palpitations, lower extremity edema  GI:   negative for nausea, vomiting, diarrhea, abdominal pain,melena  Integumentary: negative for rash and pruritus  Neurological:  negative for headaches, dizziness, vertigo, memory problems and gait       Objective:  Visit Vitals  /80 (BP 1 Location: Left arm, BP Patient Position: Sitting)   Pulse 79   Temp 98.8 °F (37.1 °C) (Oral)   Resp 22   Ht 5' 9\" (1.753 m)   Wt 276 lb 3.2 oz (125.3 kg)   SpO2 (!) 88%   BMI 40.79 kg/m²     Body mass index is 40.79 kg/m². Physical Exam:   General appearance - alert, well appearing, and in no distress  Mental status - alert, oriented to person, place, and time  EYE-VIVEK, EOMI, sclera clear. No lid swelling or purulent drainage  ENT- TM's clear without A/F level.  Pharynx slightly erythematous with drainage noted  Nose - normal and patent, no erythema,  Neck - supple, no significant adenopathy   Chest - clear to auscultation, no wheezes, rales or rhonchi, symmetric air entry   Heart - normal rate, regular rhythm, normal S1, S2, no murmurs, rubs, clicks or gallops   Skin-No rash appreciated  Neuro -alert, oriented, normal speech, no focal findings. Assessment/Plan:  Diagnoses and all orders for this visit:    Acute maxillary sinusitis, recurrence not specified  -     doxycycline (VIBRA-TABS) 100 mg tablet; Take 1 Tab by mouth two (2) times a day for 10 days. , Normal, Disp-20 Tab, R-0    Mild intermittent asthma without complication    Other orders  -     triamcinolone acetonide (KENALOG) 0.1 % topical cream; Apply  to affected area two (2) times a day. use thin layer, Normal, Disp-15 g, R-0  -     clotrimazole-betamethasone (LOTRISONE) topical cream; Apply  to affected area two (2) times a day., Normal, Disp-15 g, R-0        Other Instructions: The patient's medications were reviewed and reconciled. Body mass index is 40.79 and dietary counseling along with printed patient education is given    Mucinex as directed    Increase po fluids    Follow-up and Dispositions    · Return if symptoms worsen or fail to improve. Please note that this dictation was completed with [employer], the computer voice recognition software. Quite often unanticipated grammatical, syntax, homophones, and other interpretive errors are inadvertently transcribed by the computer software. Please disregard these errors. Please excuse any errors that have escaped final proofreading. I have reviewed with the patient details of the assessment and plan and all questions were answered. Relevent patient education was performed. An After Visit Summary was printed and given to the patient.     Mahad Blackburn MD

## 2020-03-05 NOTE — PATIENT INSTRUCTIONS

## 2020-03-10 ENCOUNTER — TELEPHONE (OUTPATIENT)
Dept: INTERNAL MEDICINE CLINIC | Age: 70
End: 2020-03-10

## 2020-03-10 RX ORDER — AZITHROMYCIN 250 MG/1
250 TABLET, FILM COATED ORAL SEE ADMIN INSTRUCTIONS
Qty: 6 TAB | Refills: 0 | Status: SHIPPED | OUTPATIENT
Start: 2020-03-10 | End: 2020-03-15

## 2020-03-10 NOTE — TELEPHONE ENCOUNTER
Patient states she still feels horrible and has been taking the antibiotic.     Also has been take a over the counter cough syrup    With no luck     [phone]

## 2020-03-19 ENCOUNTER — TELEPHONE (OUTPATIENT)
Dept: INTERNAL MEDICINE CLINIC | Age: 70
End: 2020-03-19

## 2020-03-19 RX ORDER — AZITHROMYCIN 250 MG/1
250 TABLET, FILM COATED ORAL SEE ADMIN INSTRUCTIONS
Qty: 6 TAB | Refills: 0 | Status: SHIPPED | OUTPATIENT
Start: 2020-03-19 | End: 2020-03-24

## 2020-03-19 NOTE — TELEPHONE ENCOUNTER
Patient called in to see what mccarthy Dr. aJret Rogers suggest on her symptoms. Patient stated she was in to see the doctor on 03/05 and she still doesn't feel good. She has a cough, runny nose, drainage, and asthma acting up. Her symptoms keeps coming back and asking what should she do. Patient stated she is taking benedryl, delsyum, and antibiotics.   QP:[phone]

## 2020-03-19 NOTE — TELEPHONE ENCOUNTER
Start her on a Z-Cody to see if this is helpful.   May need appointment next week if there is no improvement for follow-up and chest x-ray

## 2020-04-10 ENCOUNTER — TELEPHONE (OUTPATIENT)
Dept: INTERNAL MEDICINE CLINIC | Age: 70
End: 2020-04-10

## 2020-04-10 RX ORDER — CYCLOBENZAPRINE HCL 10 MG
10 TABLET ORAL
Qty: 30 TAB | Refills: 0 | Status: SHIPPED | OUTPATIENT
Start: 2020-04-10 | End: 2020-07-01 | Stop reason: SDUPTHER

## 2020-04-10 NOTE — TELEPHONE ENCOUNTER
Patient stated she went to the [facility] [address] for cystoic Nerve Pain. Could barely walk. Patient is requesting if she can be seen by Dr. Thang Mccabe for her back so she able to get refill on her medication that was provided because it is working/ helping pain.   BS:[phone]

## 2020-04-10 NOTE — TELEPHONE ENCOUNTER
Patient agreeable to Flexeril- please send in to [facility]  Requested Prescriptions     Pending Prescriptions Disp Refills    cyclobenzaprine (FLEXERIL) 10 mg tablet 30 Tab 0     Sig: Take 1 Tab by mouth three (3) times daily as needed for Muscle Spasm(s).

## 2020-04-10 NOTE — TELEPHONE ENCOUNTER
Please get a list of the medication she is taking for her back pain and then we should will be able to give her further instructions

## 2020-04-10 NOTE — TELEPHONE ENCOUNTER
Given Hydrocodone & Cyclobenzaprine last night at [facility]- they have helped with her sciatica.  Has a virtual visit with Ortho on 4-17-20, but will not have enough med to last.

## 2020-04-13 DIAGNOSIS — M48.061 SPINAL STENOSIS, LUMBAR REGION, WITHOUT NEUROGENIC CLAUDICATION: Primary | ICD-10-CM

## 2020-04-13 RX ORDER — TRAMADOL HYDROCHLORIDE 50 MG/1
50 TABLET ORAL
Qty: 16 TAB | Refills: 0 | OUTPATIENT
Start: 2020-04-13 | End: 2020-04-17

## 2020-04-13 NOTE — TELEPHONE ENCOUNTER
Patient requesting enough Tramadol to last until her ortho virtual visit on 4-17-20  Requested Prescriptions     Pending Prescriptions Disp Refills    traMADoL (ULTRAM) 50 mg tablet 16 Tab 0     Sig: Take 1 Tab by mouth every six (6) hours as needed for Pain (Used to supplement the Lyrica when lyrica doesnt manage the pain on its own) for up to 4 days. Max Daily Amount: 200 mg. Take 50 mg by mouth daily as needed for Pain (Used to supplement the Lyrica when lyrica doesnt manage the pain on its own).

## 2020-04-13 NOTE — TELEPHONE ENCOUNTER
Requested Prescriptions     Pending Prescriptions Disp Refills    traMADoL (ULTRAM) 50 mg tablet       Sig: Take 1 Tab by mouth daily as needed for Pain (Used to supplement the Lyrica when lyrica doesnt manage the pain on its own). Patient is requesting this medication for pain.

## 2020-04-21 ENCOUNTER — TELEPHONE (OUTPATIENT)
Dept: INTERNAL MEDICINE CLINIC | Age: 70
End: 2020-04-21

## 2020-04-21 NOTE — TELEPHONE ENCOUNTER
Blood sugar was 168 with an A1c of 6.9 when she was here in October. Would have her come in in the morning for fasting blood sugar and A1c only.   Once we have more accurate information we can address her blood sugar treatment

## 2020-04-21 NOTE — TELEPHONE ENCOUNTER
Patient went to the Ortho for a back injection this morning. They checked her BS prior and it was 426 so they could not do the injection. She is not currently on any DM meds. Please advise.

## 2020-04-22 ENCOUNTER — LAB ONLY (OUTPATIENT)
Dept: INTERNAL MEDICINE CLINIC | Age: 70
End: 2020-04-22

## 2020-04-22 ENCOUNTER — TELEPHONE (OUTPATIENT)
Dept: INTERNAL MEDICINE CLINIC | Age: 70
End: 2020-04-22

## 2020-04-22 DIAGNOSIS — E11.40 TYPE 2 DIABETES MELLITUS WITH DIABETIC NEUROPATHY, WITHOUT LONG-TERM CURRENT USE OF INSULIN (HCC): Primary | Chronic | ICD-10-CM

## 2020-04-22 LAB
GLUCOSE SERPL-MCNC: 283 MG/DL (ref 65–105)
HBA1C MFR BLD HPLC: 10.9 % (ref 4–5.7)

## 2020-04-22 RX ORDER — GLIMEPIRIDE 2 MG/1
2 TABLET ORAL
Qty: 30 TAB | Refills: 3 | Status: SHIPPED | OUTPATIENT
Start: 2020-04-22 | End: 2020-05-04 | Stop reason: SDUPTHER

## 2020-04-22 NOTE — TELEPHONE ENCOUNTER
Patient notified of lab results and is agreeable to starting medication. Please send in to [facility]. Requested Prescriptions     Pending Prescriptions Disp Refills    glimepiride (AMARYL) 2 mg tablet 30 Tab 3     Sig: Take 1 Tab by mouth every morning.

## 2020-04-22 NOTE — TELEPHONE ENCOUNTER
----- Message from Prema Powers MD sent at 4/22/2020  2:35 PM EDT -----  Fasting blood sugar was 283 and A1c has climbed from 6.8 to10.9.   Start glimepiride 2 mg every morning  Recheck fasting blood sugar 1 week

## 2020-04-22 NOTE — PROGRESS NOTES
Fasting blood sugar was 283 and A1c has climbed from 6.8 to10.9.   Start glimepiride 2 mg every morning  Recheck fasting blood sugar 1 week

## 2020-04-30 ENCOUNTER — OFFICE VISIT (OUTPATIENT)
Dept: INTERNAL MEDICINE CLINIC | Age: 70
End: 2020-04-30

## 2020-04-30 VITALS
HEIGHT: 69 IN | HEART RATE: 84 BPM | SYSTOLIC BLOOD PRESSURE: 128 MMHG | TEMPERATURE: 97.4 F | WEIGHT: 275.4 LBS | DIASTOLIC BLOOD PRESSURE: 80 MMHG | BODY MASS INDEX: 40.79 KG/M2 | RESPIRATION RATE: 16 BRPM | OXYGEN SATURATION: 97 %

## 2020-04-30 DIAGNOSIS — Z79.899 LONG-TERM USE OF HIGH-RISK MEDICATION: Chronic | ICD-10-CM

## 2020-04-30 DIAGNOSIS — I10 ESSENTIAL HYPERTENSION: Chronic | ICD-10-CM

## 2020-04-30 DIAGNOSIS — E66.9 OBESITY (BMI 30-39.9): Chronic | ICD-10-CM

## 2020-04-30 DIAGNOSIS — E78.00 HYPERCHOLESTEROLEMIA: Chronic | ICD-10-CM

## 2020-04-30 DIAGNOSIS — F41.9 ANXIETY: Chronic | ICD-10-CM

## 2020-04-30 DIAGNOSIS — I48.20 CHRONIC ATRIAL FIBRILLATION (HCC): Chronic | ICD-10-CM

## 2020-04-30 DIAGNOSIS — E11.21 TYPE 2 DIABETES WITH NEPHROPATHY (HCC): ICD-10-CM

## 2020-04-30 DIAGNOSIS — I25.5 ISCHEMIC CARDIOMYOPATHY: Chronic | ICD-10-CM

## 2020-04-30 DIAGNOSIS — N39.0 URINARY TRACT INFECTION WITHOUT HEMATURIA, SITE UNSPECIFIED: ICD-10-CM

## 2020-04-30 DIAGNOSIS — Z00.00 MEDICARE ANNUAL WELLNESS VISIT, SUBSEQUENT: Primary | ICD-10-CM

## 2020-04-30 DIAGNOSIS — I50.22 SYSTOLIC CHF, CHRONIC (HCC): Chronic | ICD-10-CM

## 2020-04-30 DIAGNOSIS — E11.40 TYPE 2 DIABETES MELLITUS WITH DIABETIC NEUROPATHY, WITHOUT LONG-TERM CURRENT USE OF INSULIN (HCC): Chronic | ICD-10-CM

## 2020-04-30 LAB
A-G RATIO,AGRAT: 1.7 RATIO
ALBUMIN SERPL-MCNC: 4.3 G/DL (ref 3.9–5.4)
ALP SERPL-CCNC: 154 U/L (ref 38–126)
ALT SERPL-CCNC: 23 U/L (ref 0–35)
ANION GAP SERPL CALC-SCNC: 13 MMOL/L
AST SERPL W P-5'-P-CCNC: 50 U/L (ref 14–36)
BILIRUB SERPL-MCNC: 1.1 MG/DL (ref 0.2–1.3)
BUN SERPL-MCNC: 20 MG/DL (ref 7–17)
BUN/CREATININE RATIO,BUCR: 13 RATIO
CALCIUM SERPL-MCNC: 9.5 MG/DL (ref 8.4–10.2)
CHLORIDE SERPL-SCNC: 105 MMOL/L (ref 98–107)
CHOL/HDL RATIO,CHHD: 3 RATIO (ref 0–4)
CHOLEST SERPL-MCNC: 129 MG/DL (ref 0–200)
CK SERPL-CCNC: 62 U/L (ref 30–135)
CO2 SERPL-SCNC: 22 MMOL/L (ref 22–32)
CREAT SERPL-MCNC: 1.5 MG/DL (ref 0.7–1.2)
GLOBULIN,GLOB: 2.6
GLUCOSE SERPL-MCNC: 184 MG/DL (ref 65–105)
HDLC SERPL-MCNC: 38 MG/DL (ref 35–130)
LDL/HDL RATIO,LDHD: 1 RATIO
LDLC SERPL CALC-MCNC: 37 MG/DL (ref 0–130)
POTASSIUM SERPL-SCNC: 4.4 MMOL/L (ref 3.6–5)
PROT SERPL-MCNC: 6.9 G/DL (ref 6.3–8.2)
SODIUM SERPL-SCNC: 140 MMOL/L (ref 137–145)
TRIGL SERPL-MCNC: 270 MG/DL (ref 0–200)
TSH SERPL DL<=0.05 MIU/L-ACNC: 1.84 UIU/ML (ref 0.34–5.6)
VLDLC SERPL CALC-MCNC: 54 MG/DL

## 2020-04-30 NOTE — PATIENT INSTRUCTIONS
Learning About Cutting Calories How do calories affect your weight? Food gives your body energy. Energy from the food you eat is measured in calories. This energy keeps your heart beating, your brain active, and your muscles working. Your body needs a certain number of calories each day. After your body uses the calories it needs, it stores extra calories as fat. To lose weight safely, you have to eat fewer calories while eating in a healthy way. How many calories do you need each day? The more active you are, the more calories you need. When you are less active, you need fewer calories. How many calories you need each day also depends on several things, including your age and whether you are male or female. Here are some general guidelines for adults: 
· Less active women and older adults need 1,600 to 2,000 calories each day. · Active women and less active men need 2,000 to 2,400 calories each day. · Active men need 2,400 to 3,000 calories each day. How can you cut calories and eat healthy meals? Whole grains, vegetables and fruits, and dried beans are good lower-calorie foods. They give you lots of nutrients and fiber. And they fill you up. Sweets, energy drinks, and soda pop are high in calories. They give you few nutrients and no fiber. Try to limit soda pop, fruit juice, and energy drinks. Drink water instead. Some fats can be part of a healthy diet. But cutting back on fats from highly processed foods like fast foods and many snack foods is a good way to lower the calories in your diet. Also, use smaller amounts of fats like butter, margarine, salad dressing, and mayonnaise. Add fresh garlic, lemon, or herbs to your meals to add flavor without adding fat. Meats and dairy products can be a big source of hidden fats. Try to choose lean or low-fat versions of these products.  
Fat-free cookies, candies, chips, and frozen treats can still be high in sugar and calories. Some fat-free foods have more calories than regular ones. Eat fat-free treats in moderation, as you would other foods. If your favorite foods are high in fat, salt, sugar, or calories, limit how often you eat them. Eat smaller servings, or look for healthy substitutes. Fill up on fruits, vegetables, and whole grains. Eating at home · Use meat as a side dish instead of as the main part of your meal. 
· Try main dishes that use whole wheat pasta, brown rice, dried beans, or vegetables. · Find ways to cook with little or no fat, such as broiling, steaming, or grilling. · Use cooking spray instead of oil. If you use oil, use a monounsaturated oil, such as canola or olive oil. · Trim fat from meats before you cook them. · Drain off fat after you brown the meat or while you roast it. · Chill soups and stews after you cook them. Then skim the fat off the top after it hardens. Eating out · Order foods that are broiled or poached rather than fried or breaded. · Cut back on the amount of butter or margarine that you use on bread. · Order sauces, gravies, and salad dressings on the side, and use only a little. · When you order pasta, choose tomato sauce rather than cream sauce. · Ask for salsa with your baked potato instead of sour cream, butter, cheese, or beltran. · Order meals in a small size instead of upgrading to a large. · Share an entree, or take part of your food home to eat as another meal. 
· Share appetizers and desserts. Where can you learn more? Go to http://dawood-vera.info/ Enter 99 [id number] in the search box to learn more about \"Learning About Cutting Calories. \" Current as of: August 21, 2019Content Version: 12.4 © 6765-5091 [employer]. Care instructions adapted under license by [employer] (which disclaims liability or warranty for this information).  If you have questions about a medical condition or this instruction, always ask your healthcare professional. [address] any warranty or liability for your use of this information. The best way to stay healthy is to live a healthy lifestyle. A healthy lifestyle includes regular exercise, eating a well-balanced diet, keeping a healthy weight and not smoking. Regular physical exams and screening tests are another important way to take care of yourself. Preventive exams provided by health care providers can find health problems early when treatment works best and can keep you from getting certain diseases or illnesses. Preventive services include exams, lab tests, screenings, shots, monitoring and information to help you take care of your own health. All people over 65 should have a pneumonia shot. Pneumonia shots are usually only needed once in a lifetime unless your doctor decides differently. In addition to your physical exam, some screening tests are recommended: 
 
All people over 65 should have a yearly flu shot. People over 65 are at medium to high risk for Hepatitis B. Three shots are needed for complete protection. Bone mass measurement (dexa scan) is recommended every two years. Diabetes Mellitus screening is recommended every year. Glaucoma is an eye disease caused by high pressure in the eye. An eye exam is recommended every year. Cardiovascular screening tests that check your cholesterol and other blood fat (lipid) levels are recommended every five years. Colorectal Cancer screening tests help to find pre-cancerous polyps (growths in the colon) so they can be removed before they turn into cancer. Tests ordered for screening depend on your personal and family history risk factors. Prostate Cancer Screening (annually up to age 76) Screening for breast cancer is recommended yearly with a Mammogram. 
 
 Screening for cervical and vaginal cancer is recommended with a pelvic and Pap test every two years. However if you have had an abnormal pap in the past  three years or at high risk for cervical or vaginal cancer Medicare will cover a pap test and a pelvic exam every year. Here is a list of your current Health Maintenance items with a due date: 
Health Maintenance Due Topic Date Due  
 DTaP/Tdap/Td  (1 - Tdap) 09/13/1973  Shingles Vaccine (1 of 2) 09/13/2002  Glaucoma Screening   04/18/2020  Mammogram  04/04/2020 [facility] Exam  04/18/2020 [facility] Annual Well Visit  04/30/2020

## 2020-04-30 NOTE — PROGRESS NOTES
This note will not be viewable in [address] Otoniel Potter is a 81 y.o. female and presents with Follow Up Chronic Condition (6 mo fu) and Annual Wellness Visit  . Subjective:  Mrs. Steve Hughes is seen in the office today for Medicare wellness check and follow-up of multiple medical problems. The patient has type 2 diabetes mellitus which for years had not been treated with any type of medication. She last had an A1c done approximately 6 months ago which was 6.9. Over the last 6 months she has been less active and has been eating more and she began to run high blood sugars. A recent A1c was obtained and was over 10. Patient was started on glimepiride. She has not been checking her blood sugars but is been due for blood work today. She is up-to-date on eye examinations. Her diabetes is complicated by nephropathy and neuropathy for which she takes medications for the neuropathy. Patient has underlying heart disease with an ischemic cardiomyopathy and a history of chronic atrial fibrillation. She does take isorbide for her heart disease and is on chronic anticoagulation for stroke prevention. She denies any palpitations PND, orthopnea. She does have chronic lower extremity lymphedema. She denies exertional chest pains. Her blood pressure is currently managed on a multidrug regimen including hydralazine, Bumex, lisinopril, amlodipine, metoprolol. She denies orthostatic dizziness. She denies headaches, numbness, tingling or focal neurological problems. She remains on statin therapy for hypercholesterolemia. She tolerates this without muscle soreness or GI upset. She denies any claudication or strokelike symptoms. She has chronic anxiety. Is currently on venlafaxine as well as needed the JESSICA.  She finds it this works effectively for her. She does not feel impaired or drowsy while on the medication.     Past Medical History:   Diagnosis Date    Anxiety 1/22/2018    Arthritis     OA    Asthma     Diabetes ([facility] [address]     Essential hypertension     GERD (gastroesophageal reflux disease)     Hypercholesterolemia 1/22/2018    Hypertension     Ill-defined condition     diverticulitis    Long-term use of high-risk medication 1/22/2018    Neuropathy     Obesity (BMI 30-39.9) 4/30/2019    Other ill-defined conditions(799.89)     IBS, spinal stenosis    Plantar fasciitis     Psychiatric disorder     depression, anxiety    Sleep apnea     uses CPAP    Type 2 diabetes mellitus with diabetic neuropathy, without long-term current use of insulin ([facility] [address] 6/5/2016     Past Surgical History:   Procedure Laterality Date    CARDIAC SURG PROCEDURE UNLIST      stent    COLONOSCOPY N/A 3/14/2018    COLONOSCOPY performed by Naty Cottrell MD at [facility] ENDOSCOPY    HX APPENDECTOMY      HX CHOLECYSTECTOMY  11/15/2018    HX HYSTERECTOMY      HX PACEMAKER       Allergies   Allergen Reactions    Sulfa (Sulfonamide Antibiotics) Swelling    Amoxicillin Swelling     Current Outpatient Medications   Medication Sig Dispense Refill    glimepiride (AMARYL) 2 mg tablet Take 1 Tab by mouth every morning. 30 Tab 3    cyclobenzaprine (FLEXERIL) 10 mg tablet Take 1 Tab by mouth three (3) times daily as needed for Muscle Spasm(s). 30 Tab 0    triamcinolone acetonide (KENALOG) 0.1 % topical cream Apply  to affected area two (2) times a day. use thin layer 15 g 0    clotrimazole-betamethasone (LOTRISONE) topical cream Apply  to affected area two (2) times a day. 15 g 0    magnesium oxide (MAG-OX) 400 mg tablet TAKE 1 TABLET BY MOUTH TWICE DAILY 60 Tab 5    isosorbide dinitrate (ISORDIL) 30 mg tablet Take 30 mg by mouth daily.  hydrALAZINE (APRESOLINE) 25 mg tablet Take 25 mg by mouth two (2) times a day.  potassium chloride SR (KLOR-CON 10) 10 mEq tablet Take 2 Tabs by mouth three (3) times daily.  (Patient taking differently: Take 10 mEq by mouth three (3) times daily.) 270 Tab 3    clonazePAM (KLONOPIN) 0.5 mg tablet TAKE 1 TABLET EVERY NIGHT. MAX DAILY DOSE OF 0.5MG. 90 Tab 1    calcium carb/vit D2/minerals (CALTRATE PLUS PO) Take  by mouth daily.  SYMBICORT 160-4.5 mcg/actuation HFAA INHALE 2 PUFFS BY MOUTH TWICE DAILY 1 Inhaler 3    bumetanide (BUMEX) 2 mg tablet TAKE 1 TABLET EVERY DAY 90 Tab 3    omeprazole (PRILOSEC) 40 mg capsule TAKE 1 CAPSULE EVERY DAY 90 Cap 3    albuterol (PROVENTIL HFA, VENTOLIN HFA, PROAIR HFA) 90 mcg/actuation inhaler Take 1 Puff by inhalation every four (4) hours as needed for Wheezing. 1 Inhaler 6    lisinopril (PRINIVIL, ZESTRIL) 40 mg tablet Take 40 mg by mouth daily.  diphenhydrAMINE (BENADRYL) 25 mg capsule Take 25 mg by mouth two (2) times a day.  nystatin (MYCOSTATIN) topical cream Apply  to affected area two (2) times daily as needed for Skin Irritation (Rash).  traMADol (ULTRAM) 50 mg tablet Take 50 mg by mouth daily as needed for Pain (Used to supplement the Lyrica when lyrica doesnt manage the pain on its own).  dabigatran etexilate (PRADAXA) 150 mg capsule Take 1 Cap by mouth two (2) times a day. IF NO BLEEDING AT CATH SITE. 60 Cap 12    amLODIPine (NORVASC) 2.5 mg tablet Take 1 Tab by mouth daily. (Patient taking differently: Take 10 mg by mouth daily.) 90 Tab 3    metoprolol succinate (TOPROL-XL) 100 mg tablet Take 1 Tab by mouth daily. 30 Tab prn    acetaminophen (TYLENOL EXTRA STRENGTH) 500 mg tablet Take 1,000 mg by mouth every eight (8) hours as needed for Pain (Headache).  lidocaine (ASPERCREME, LIDOCAINE,) 4 % topical cream Apply  to affected area daily as needed for Pain (Knee pain).  sodium chloride (AYR SALINE) 0.65 % nasal squeeze bottle 2 Sprays by Both Nostrils route every eight (8) hours as needed.  conjugated estrogens (PREMARIN) 0.625 mg/gram vaginal cream Insert 0.5 g into vagina daily as needed.  Tuesdays and Thursdays       venlafaxine-SR (EFFEXOR XR) 150 mg capsule Take 150 mg by mouth two (2) times daily (with meals).  pregabalin (LYRICA) 200 mg capsule Take 200 mg by mouth two (2) times a day.  cholecalciferol, vitamin d3, (VITAMIN D3) 400 unit cap Take 400 Units by mouth daily.  aspirin 81 mg chewable tablet Take 81 mg by mouth daily.  atorvastatin (LIPITOR) 40 mg tablet Take 1 Tab by mouth nightly.  27 Tab 12     Social History     Socioeconomic History    Marital status:      Spouse name: Not on file    Number of children: Not on file    Years of education: Not on file    Highest education level: Not on file   Tobacco Use    Smoking status: Never Smoker    Smokeless tobacco: Never Used   Substance and Sexual Activity    Alcohol use: No    Drug use: No     Family History   Problem Relation Age of Onset    Arthritis-osteo Mother     Hypertension Mother     High Cholesterol Mother     Crohn's Disease Mother     Heart Disease Mother     Alcohol abuse Father     High Cholesterol Sister     Hypertension Sister     Thyroid Disease Sister     COPD Sister     High Cholesterol Brother     Hypertension Brother     COPD Brother     COPD Child     Inflammatory Bowel Dz Child        Health Maintenance   Topic Date Due    DTaP/Tdap/Td series (1 - Tdap) 09/13/1973    Shingrix Vaccine Age 50> (1 of 2) 09/13/2002    GLAUCOMA SCREENING Q2Y  04/18/2020    Breast Cancer Screen Mammogram  04/04/2020    Eye Exam Retinal or Dilated  04/18/2020    Medicare Yearly Exam  04/30/2020    A1C test (Diabetic or Prediabetic)  07/22/2020    Influenza Age 9 to Adult  08/01/2020    Pneumococcal 65+ years (2 of 2 - PPSV23) 10/11/2020    Foot Exam Q1  10/29/2020    MICROALBUMIN Q1  10/29/2020    Lipid Screen  10/29/2020    Colonoscopy  03/14/2028    Hepatitis C Screening  Completed    Bone Densitometry (Dexa) Screening  Completed        Review of Systems  Constitutional: negative for fevers, chills, anorexia and weight loss  Eyes:   negative for visual disturbance and irritation  ENT: negative for tinnitus,sore throat,nasal congestion,ear pain,hoarseness  Respiratory:  negative for cough, hemoptysis, dyspnea,wheezing  CV:   negative for chest pain, palpitations. Positive for chronic lower extremity lymphedema  GI:   negative for nausea, vomiting, diarrhea, abdominal pain,melena  Endo:               negative for polyuria,polydipsia,polyphagia,heat intolerance  Genitourinary: negative for frequency, dysuria and hematuria  Integumentary: negative for rash and pruritus  Hematologic:  negative for easy bruising and gum/nose bleeding  Musculoskel: negative for myalgias, arthralgias, back pain, muscle weakness, joint pain  Neurological:  Positive for chronic back pain and burning paresthesias of her feet Behavl/Psych: negative for feelings of anxiety, depression, mood changes  ROS otherwise negative      Objective:  Visit Vitals  /80 (BP 1 Location: Left arm, BP Patient Position: Sitting)   Pulse 84   Temp 97.4 °F (36.3 °C) (Oral)   Resp 16   Ht 5' 9\" (1.753 m)   Wt 275 lb 6.4 oz (124.9 kg)   SpO2 97%   BMI 40.67 kg/m²     Body mass index is 40.67 kg/m². Physical Exam:   General appearance - alert, well appearing, and in no distress  Mental status - alert, oriented to person, place, and time  EYE-VIVEK, EOMI,conjunctiva normal bilaterally, lids normal  ENT-ENT exam normal, no neck nodes or sinus tenderness  Nose - normal and patent, no erythema,  Or discharge   Mouth - mucous membranes moist, pharynx normal without lesions  Neck - supple, no significant adenopathy or bruit  Chest - clear to auscultation, no wheezes, rales or rhonchi. Heart - normal rate, regular rhythm, normal S1, S2, no murmurs, rubs, clicks or gallops   Abdomen - soft, nontender, nondistended, no masses or organomegaly  Lymph- no adenopathy palpable  Ext-chronic lower extremity lymphedema is present with stasis dermatitis  Skin-Warm and dry.  no hyperpigmentation, vitiligo, or suspicious lesions  Neuro -alert, oriented, normal speech, no focal findings or movement disorder noted    In addition this patient is seen for AWV  as detailed below: This is a Subsequent Medicare Annual Wellness Exam (AWV) (Performed 12 months after IPPE or effective date of Medicare Part B enrollment)    I have reviewed the patient's medical history in detail and updated the computerized patient record. Problem list reviewed with patient and risk factors discussed. PSH, SH, FH, Medications and HM issues also reviewed and discussed. Depression screen, fall risk assessment, functional abilities and ACP also reviewed and discussed as above and below. Depression Risk Factor Screening:     3 most recent PHQ Screens 4/30/2020   Little interest or pleasure in doing things Not at all   Feeling down, depressed, irritable, or hopeless Not at all   Total Score PHQ 2 0   Trouble falling or staying asleep, or sleeping too much Not at all   Feeling tired or having little energy Not at all   Poor appetite, weight loss, or overeating Not at all   Feeling bad about yourself - or that you are a failure or have let yourself or your family down Not at all   Trouble concentrating on things such as school, work, reading, or watching TV Not at all   Moving or speaking so slowly that other people could have noticed; or the opposite being so fidgety that others notice Not at all   Thoughts of being better off dead, or hurting yourself in some way Not at all   PHQ 9 Score 0   How difficult have these problems made it for you to do your work, take care of your home and get along with others Not difficult at all     Alcohol Risk Factor Screening:     Alcohol Risk Factor Screening:   Do you average 1 drink per night or more than 7 drinks a week:  No    On any one occasion in the past three months have you have had more than 3 drinks containing alcohol:  No    Functional Ability and Level of Safety:   Hearing Loss  Hearing is good.     Activities of Daily Living  The home contains: handrails and grab bars  Patient needs help with:  transportation, shopping, preparing meals, laundry, housework, managing medications and managing money    Fall Risk  Fall Risk Assessment, last 12 mths 4/30/2020   Able to walk? Yes   Fall in past 12 months? No   Fall with injury? -   Number of falls in past 12 months -   Fall Risk Score -       Abuse Screen  Patient is not abused    Cognitive Screening   Evaluation of Cognitive Function:  Has your family/caregiver stated any concerns about your memory: no  Normal    Patient Care Team   Patient Care Team:  Sanket Ruano MD as PCP - General (Internal Medicine)  Sanket Ruano MD as PCP - [facility]  Eugenia Donahue MD (Nephrology)  Flavia Cowan MD (Cardiology)  Kasia Shaw MD (Female Pelvic Medicine and Reconstructive Surgery)  German Jo RN as Ambulatory Care Manager    Assessment/Plan   Education and counseling provided:  Are appropriate based on today's review and evaluation    Assessment/Plan:   Impressions:      ICD-10-CM ICD-9-CM    1. Medicare annual wellness visit, subsequent Z00.00 V70.0    2. Type 2 diabetes with nephropathy (HCC) E11.21 250.40 URINE, MICROALBUMIN, SEMIQUANTITATIVE     583.81    3. Type 2 diabetes mellitus with diabetic neuropathy, without long-term current use of insulin (HCC) E11.40 250.60      357.2    4. Essential hypertension I10 401.9 CBC WITH AUTOMATED DIFF      COLLECTION VENOUS BLOOD,VENIPUNCTURE      METABOLIC PANEL, COMPREHENSIVE      URINALYSIS W/MICROSCOPIC   5. Hypercholesterolemia E78.00 272.0 LIPID PANEL      TSH 3RD GENERATION   6. Long-term use of high-risk medication Z79.899 V58.69 CK   7. Ischemic cardiomyopathy I25.5 414.8    8. Systolic CHF, chronic (HCC) I50.22 428.22      428.0    9. Chronic atrial fibrillation I48.20 427.31    10. Anxiety F41.9 300.00    11. Obesity (BMI 30-39. 9) E66.9 278.00         Plan:  1. Continue present meds  2.  Lifestyle modifications including Na restriction, low carb/fat diet, weight reduction and exercise (at least a walking program). Follow-up and Dispositions    · Return in about 6 months (around 10/30/2020). Orders Placed This Encounter    CBC WITH AUTOMATED DIFF    CK ([facility])    LIPID PANEL ([facility])    METABOLIC PANEL, COMPREHENSIVE ([facility])    TSH 3RD GENERATION ([facility])    URINALYSIS W/MICROSCOPIC ([facility])    URINE, MICROALBUMIN, SEMIQUANTITATIVE ([facility])    COLLECTION VENOUS BLOOD,VENIPUNCTURE       Radha Benson MD   Assessment/Plan:  Diagnoses and all orders for this visit:    Medicare annual wellness visit, subsequent    Type 2 diabetes with nephropathy ([facility] [address]  -     URINE, MICROALBUMIN, SEMIQUANTITATIVE    Type 2 diabetes mellitus with diabetic neuropathy, without long-term current use of insulin (HCC)    Essential hypertension  -     CBC WITH AUTOMATED DIFF  -     COLLECTION VENOUS BLOOD,VENIPUNCTURE  -     METABOLIC PANEL, COMPREHENSIVE  -     URINALYSIS W/MICROSCOPIC    Hypercholesterolemia  -     LIPID PANEL  -     TSH 3RD GENERATION    Long-term use of high-risk medication  -     CK    Ischemic cardiomyopathy    Systolic CHF, chronic (HCC)    Chronic atrial fibrillation    Anxiety    Obesity (BMI 30-39. 9)    Other orders  -     Cancel: HEMOGLOBIN A1C W/O EAG        Health Maintenance Due   Topic Date Due    DTaP/Tdap/Td series (1 - Tdap) 09/13/1973    Shingrix Vaccine Age 50> (1 of 2) 09/13/2002    GLAUCOMA SCREENING Q2Y  04/18/2020    Breast Cancer Screen Mammogram  04/04/2020    Eye Exam Retinal or Dilated  04/18/2020    Medicare Yearly Exam  04/30/2020       Other instructions: The patient's medications were reviewed and reconciled. No change in her current medical regimen will be made pending results of today's labs.   The patient has uncontrolled type 2 diabetes mellitus at present time which likely is related to lack of physical activity and overeating. We will need to adjust her medications in order to get her A1c down to 7. Body mass index is 40.7 and dietary counseling along with printed patient education is given    Consider start of once daily blood sugar checks. Its issues were reviewed and she is up-to-date on glaucoma screening and will have a copy of her last report sent to us. Age-appropriate vaccinations were reviewed and Tdap and shingles vaccine were recommended to be obtained at her pharmacy. Await results of multiple labs    Follow-up in 6 months    Follow-up and Dispositions    · Return in about 6 months (around 10/30/2020). I have reviewed with the patient details of the assessment and plan and all questions were answered. Relevent patient education was performed. The most recent lab findings were reviewed with the patient. An After Visit Summary was printed and given to the patient. Janel Pascual MD    Please note that this dictation was completed with [employer], the computer voice recognition software. Quite often unanticipated grammatical, syntax, homophones, and other interpretive errors are inadvertently transcribed by the computer software. Please disregard these errors. Please excuse any errors that have escaped final proofreading.

## 2020-04-30 NOTE — PROGRESS NOTES
Chief Complaint   Patient presents with    Follow Up Chronic Condition     6 mo fu    Annual Wellness Visit       Depression Risk Factor Screening:     3 most recent PHQ Screens 4/30/2020   Little interest or pleasure in doing things Not at all   Feeling down, depressed, irritable, or hopeless Not at all   Total Score PHQ 2 0   Trouble falling or staying asleep, or sleeping too much Not at all   Feeling tired or having little energy Not at all   Poor appetite, weight loss, or overeating Not at all   Feeling bad about yourself - or that you are a failure or have let yourself or your family down Not at all   Trouble concentrating on things such as school, work, reading, or watching TV Not at all   Moving or speaking so slowly that other people could have noticed; or the opposite being so fidgety that others notice Not at all   Thoughts of being better off dead, or hurting yourself in some way Not at all   PHQ 9 Score 0   How difficult have these problems made it for you to do your work, take care of your home and get along with others Not difficult at all       Functional Ability and Level of Safety:     Activities of Daily Living  ADL Assessment 4/30/2020   Feeding yourself No Help Needed   Getting from bed to chair No Help Needed   Getting dressed No Help Needed   Bathing or showering No Help Needed   Walk across the room (includes cane/walker) No Help Needed   Using the telphone No Help Needed   Taking your medications No Help Needed   Preparing meals No Help Needed   Managing money (expenses/bills) No Help Needed   Moderately strenuous housework (laundry) No Help Needed   Shopping for personal items (toiletries/medicines) No Help Needed   Shopping for groceries No Help Needed   Driving Help Needed   Climbing a flight of stairs No Help Needed   Getting to places beyond walking distances Help Needed       Fall Risk  Fall Risk Assessment, last 12 mths 4/30/2020   Able to walk? Yes   Fall in past 12 months?  No Fall with injury? -   Number of falls in past 12 months -   Fall Risk Score -       Abuse Screen  Abuse Screening Questionnaire 4/30/2020   Do you ever feel afraid of your partner? N   Are you in a relationship with someone who physically or mentally threatens you? N   Is it safe for you to go home?  Y         Patient Care Team   Patient Care Team:  Veronique Marrero MD as PCP - General (Internal Medicine)  Veronique Marrero MD as PCP - [facility] Empaneled Provider  Pete Bradford MD (Nephrology)  Rico Trejo MD (Cardiology)  Kash Pineda MD (Female Pelvic Medicine and Reconstructive Surgery)  Glory Penny RN as Ambulatory Care Manager

## 2020-05-01 LAB
BACTERIA,BACTU: ABNORMAL
BASOPHILS # BLD AUTO: 0 X10E3/UL (ref 0–0.2)
BASOPHILS NFR BLD AUTO: 0 %
BILIRUB UR QL: NEGATIVE
CLARITY: ABNORMAL
COLOR UR: ABNORMAL
EOSINOPHIL # BLD AUTO: 0.3 X10E3/UL (ref 0–0.4)
EOSINOPHIL NFR BLD AUTO: 4 %
ERYTHROCYTE [DISTWIDTH] IN BLOOD BY AUTOMATED COUNT: 18.1 % (ref 11.7–15.4)
GLUCOSE 24H UR-MRATE: ABNORMAL G/(24.H)
HCT VFR BLD AUTO: 39.7 % (ref 34–46.6)
HGB BLD-MCNC: 12.9 G/DL (ref 11.1–15.9)
HGB UR QL STRIP: NEGATIVE
IMM GRANULOCYTES # BLD AUTO: 0 X10E3/UL (ref 0–0.1)
IMM GRANULOCYTES NFR BLD AUTO: 1 %
KETONES UR QL STRIP.AUTO: NEGATIVE
LEUKOCYTE ESTERASE: ABNORMAL
LYMPHOCYTES # BLD AUTO: 1.8 X10E3/UL (ref 0.7–3.1)
LYMPHOCYTES NFR BLD AUTO: 22 %
MCH RBC QN AUTO: 27.6 PG (ref 26.6–33)
MCHC RBC AUTO-ENTMCNC: 32.5 G/DL (ref 31.5–35.7)
MCV RBC AUTO: 85 FL (ref 79–97)
MICROALBUMIN, URINE: 20 MG/L (ref 0–20)
MONOCYTES # BLD AUTO: 0.6 X10E3/UL (ref 0.1–0.9)
MONOCYTES NFR BLD AUTO: 7 %
NEUTROPHILS # BLD AUTO: 5.3 X10E3/UL (ref 1.4–7)
NEUTROPHILS NFR BLD AUTO: 66 %
NITRITE UR QL STRIP.AUTO: NEGATIVE
PH UR STRIP: 6 [PH] (ref 5–7)
PLATELET # BLD AUTO: 260 X10E3/UL (ref 150–450)
PROT UR STRIP-MCNC: ABNORMAL MG/DL
RBC # BLD AUTO: 4.68 X10E6/UL (ref 3.77–5.28)
RBC #/AREA URNS HPF: ABNORMAL #/HPF
SP GR UR REFRACTOMETRY: 1.02 (ref 1–1.03)
SQUAMOUS EPITHELIAL CELLS: ABNORMAL
UROBILINOGEN UR QL STRIP.AUTO: NEGATIVE
WBC # BLD AUTO: 8.1 X10E3/UL (ref 3.4–10.8)
WBC URNS QL MICRO: ABNORMAL #/HPF

## 2020-05-03 LAB — BACTERIA UR CULT: NORMAL

## 2020-05-05 RX ORDER — GLIMEPIRIDE 2 MG/1
2 TABLET ORAL 2 TIMES DAILY
Qty: 60 TAB | Refills: 0 | Status: SHIPPED | OUTPATIENT
Start: 2020-05-05 | End: 2020-05-18 | Stop reason: SDUPTHER

## 2020-05-11 RX ORDER — POTASSIUM CHLORIDE 750 MG/1
10 TABLET, FILM COATED, EXTENDED RELEASE ORAL 3 TIMES DAILY
Qty: 540 TAB | Refills: 3 | Status: SHIPPED | OUTPATIENT
Start: 2020-05-11 | End: 2021-07-15

## 2020-05-18 ENCOUNTER — LAB ONLY (OUTPATIENT)
Dept: INTERNAL MEDICINE CLINIC | Age: 70
End: 2020-05-18

## 2020-05-18 DIAGNOSIS — E11.40 TYPE 2 DIABETES MELLITUS WITH DIABETIC NEUROPATHY, WITHOUT LONG-TERM CURRENT USE OF INSULIN (HCC): Primary | Chronic | ICD-10-CM

## 2020-05-18 LAB — GLUCOSE SERPL-MCNC: 143 MG/DL (ref 65–105)

## 2020-05-18 RX ORDER — GLIMEPIRIDE 2 MG/1
2 TABLET ORAL 2 TIMES DAILY
Qty: 60 TAB | Refills: 0 | Status: SHIPPED | OUTPATIENT
Start: 2020-05-18 | End: 2020-06-27

## 2020-05-18 NOTE — TELEPHONE ENCOUNTER
Requested Prescriptions     Pending Prescriptions Disp Refills    glimepiride (AMARYL) 2 mg tablet 60 Tab 0     Sig: Take 1 Tab by mouth two (2) times a day.

## 2020-05-18 NOTE — PROGRESS NOTES
Blood sugar is better at 143. Continue current glimepiride.   Recheck fasting blood sugar and A1c in 3 months

## 2020-05-22 DIAGNOSIS — F41.9 ANXIETY: ICD-10-CM

## 2020-05-22 RX ORDER — CLONAZEPAM 0.5 MG/1
TABLET ORAL
Qty: 90 TAB | Refills: 0 | Status: SHIPPED | OUTPATIENT
Start: 2020-05-22 | End: 2020-08-24

## 2020-05-31 RX ORDER — OMEPRAZOLE 40 MG/1
CAPSULE, DELAYED RELEASE ORAL
Qty: 90 CAP | Refills: 3 | Status: SHIPPED | OUTPATIENT
Start: 2020-05-31 | End: 2021-04-21 | Stop reason: ALTCHOICE

## 2020-05-31 RX ORDER — BUMETANIDE 2 MG/1
TABLET ORAL
Qty: 90 TAB | Refills: 3 | Status: ON HOLD | OUTPATIENT
Start: 2020-05-31 | End: 2020-08-12 | Stop reason: SDUPTHER

## 2020-06-03 ENCOUNTER — TELEPHONE (OUTPATIENT)
Dept: INTERNAL MEDICINE CLINIC | Age: 70
End: 2020-06-03

## 2020-06-03 NOTE — TELEPHONE ENCOUNTER
[facility]  [phone]    Patient is going in for prolia but Calcium was a little high at 9.5 which is over the limit.     Wants to know if they can go or wait and have her levels drawn again     [phone]

## 2020-06-03 NOTE — TELEPHONE ENCOUNTER
Patients calcium level was 9.5 on 4-30-20. Dr Valentino Burns said ok for the patient to have her Prolia injection tomorrow was scheduled. Hayley Grant advised.

## 2020-06-04 ENCOUNTER — HOSPITAL ENCOUNTER (OUTPATIENT)
Dept: INFUSION THERAPY | Age: 70
Discharge: HOME | End: 2020-06-04
Payer: MEDICARE

## 2020-06-04 VITALS
BODY MASS INDEX: 42.21 KG/M2 | DIASTOLIC BLOOD PRESSURE: 81 MMHG | RESPIRATION RATE: 18 BRPM | TEMPERATURE: 97.1 F | SYSTOLIC BLOOD PRESSURE: 137 MMHG | WEIGHT: 285.8 LBS | HEART RATE: 79 BPM

## 2020-06-04 PROCEDURE — 96372 THER/PROPH/DIAG INJ SC/IM: CPT

## 2020-06-04 PROCEDURE — 74011250636 HC RX REV CODE- 250/636: Performed by: INTERNAL MEDICINE

## 2020-06-04 RX ADMIN — DENOSUMAB 60 MG: 60 INJECTION SUBCUTANEOUS at 11:46

## 2020-06-04 NOTE — PROGRESS NOTES
[address] [facility] Visit Note    9890 Pt arrived at [facility] ambulatory and in no distress for Prolia. Assessment completed, no new complaints voiced. Ca 9.5 on 4/30/20, OK to use per MD. Pt denies recent or upcoming dental work. Patient Vitals for the past 12 hrs:   Temp Pulse Resp BP   06/04/20 1133 97.1 °F (36.2 °C) 79 18 137/81       Medications received:  Medications Administered     denosumab (PROLIA) injection 60 mg     Admin Date  06/04/2020 Action  Given Dose  60 mg Route  SubCUTAneous Administered By  Lu Borrego, RN                1150 Tolerated treatment well, no adverse reaction noted. D/Cd from [facility] ambulatory and in no distress accompanied by family. Next appt 12/10 @ 8148.

## 2020-06-12 NOTE — PROGRESS NOTES
Have you been tested for COVID-19 in the past 7 days? no    Do you have a personal history of COVID-19 within the past 28 days? no  If Yes, What was the method of testing: clinical assumption or test result?no    Have you had close contact with a known to be positive COVID-19 patient within the past 14 days? no    Are you a healthcare worker or ?  no  If Yes, have you been exposed to COVID-19 without proper PPE? no    Do you live in a SNF, adult home or other institutional setting? no  If Yes, have they experienced a flood of COVID-19 positive patients?no    In the past 2-14 days have you had any of the following symptoms no   Cough   New onset Shortness of breath or difficulty breathing no    Or at least two of these symptoms:no   Fever greater than 100 F   Chills   Repeated shaking with chills   Muscle pain   Headache   Sore throat   New loss of taste or smell   New onset diarrhea

## 2020-06-15 ENCOUNTER — HOSPITAL ENCOUNTER (OUTPATIENT)
Dept: CT IMAGING | Age: 70
End: 2020-06-15
Attending: ORTHOPAEDIC SURGERY
Payer: MEDICARE

## 2020-06-15 ENCOUNTER — HOSPITAL ENCOUNTER (OUTPATIENT)
Dept: GENERAL RADIOLOGY | Age: 70
Discharge: HOME | End: 2020-06-15
Attending: ORTHOPAEDIC SURGERY
Payer: MEDICARE

## 2020-06-15 ENCOUNTER — HOSPITAL ENCOUNTER (OUTPATIENT)
Dept: CT IMAGING | Age: 70
Discharge: HOME | End: 2020-06-15
Attending: ORTHOPAEDIC SURGERY
Payer: MEDICARE

## 2020-06-15 VITALS
RESPIRATION RATE: 18 BRPM | SYSTOLIC BLOOD PRESSURE: 151 MMHG | HEART RATE: 80 BPM | OXYGEN SATURATION: 95 % | DIASTOLIC BLOOD PRESSURE: 74 MMHG

## 2020-06-15 DIAGNOSIS — M54.50 LOW BACK PAIN, UNSPECIFIED BACK PAIN LATERALITY, UNSPECIFIED CHRONICITY, UNSPECIFIED WHETHER SCIATICA PRESENT: ICD-10-CM

## 2020-06-15 DIAGNOSIS — J45.20 INTRINSIC ASTHMA WITHOUT STATUS ASTHMATICUS: ICD-10-CM

## 2020-06-15 DIAGNOSIS — M54.32 SCIATICA OF LEFT SIDE: ICD-10-CM

## 2020-06-15 DIAGNOSIS — M47.816 LUMBAR SPONDYLOSIS: ICD-10-CM

## 2020-06-15 DIAGNOSIS — M54.2 CERVICALGIA: ICD-10-CM

## 2020-06-15 PROCEDURE — 62305 MYELOGRAPHY LUMBAR INJECTION: CPT

## 2020-06-15 PROCEDURE — 72126 CT NECK SPINE W/DYE: CPT

## 2020-06-15 PROCEDURE — 71046 X-RAY EXAM CHEST 2 VIEWS: CPT

## 2020-06-15 PROCEDURE — 74011636320 HC RX REV CODE- 636/320: Performed by: RADIOLOGY

## 2020-06-15 PROCEDURE — 72132 CT LUMBAR SPINE W/DYE: CPT

## 2020-06-15 RX ADMIN — IOHEXOL 10 ML: 300 INJECTION, SOLUTION INTRAVENOUS at 08:35

## 2020-06-15 NOTE — PROGRESS NOTES
Pt received from CT post procedure on stretcher. Pt resting comfortably. Discharge instructions discussed.

## 2020-06-15 NOTE — PROGRESS NOTES
Procedure site clean and dry w/ band aid. Discharge instructions have been reviewed with patient and present family. Copy given to patient. Pt verbalized understand of instructions and was given  the opportunity to ask questions and receive answers prior to leaving. Pt discharged with family and assisted out by RN in wheelchair.

## 2020-06-15 NOTE — DISCHARGE INSTRUCTIONS
[address]  Radiology Department  [phone]    Radiologist: Dr. Oliva Guerra    Date: 6/15/2020      Myelogram Discharge Instructions    Go home and rest and restrict your activity the next 24 - 48 hours. Rest in a reclined position, keeping your head elevated to minimize post procedure complications. Resume your previous diet and prescribed medications. Increase your fluid intake over the next 1-2 days to help the kidneys flush out the dye that was injected during your procedure. You may take Tylenol if allowed, as directed on the label, for pain or discomfort. Avoid Ibuprofen (Advil, Motrin etc.) and Aspirin today as they may increase your risk of bleeding. Avoid heavy lifting (nothing greater than 5 pounds),  excessive bending, pushing or pulling movements for 2 days to minimize your risk of post procedure headache. You may shower in 24 hours. Do not soak or swim until the site has healed completely. Results will be sent to your physician as soon as they become available. Follow up with your physician as previously discussed and be sure to bring the CD to that was provided for you today to your appointment. If you have any questions regarding your procedure please call and ask to speak to a radiology nurse.

## 2020-06-19 RX ORDER — LANOLIN ALCOHOL/MO/W.PET/CERES
CREAM (GRAM) TOPICAL
Qty: 60 TAB | Refills: 0 | Status: SHIPPED | OUTPATIENT
Start: 2020-06-19 | End: 2020-07-24

## 2020-06-22 RX ORDER — CLOTRIMAZOLE AND BETAMETHASONE DIPROPIONATE 10; .64 MG/G; MG/G
CREAM TOPICAL
Qty: 15 G | Refills: 0 | Status: SHIPPED | OUTPATIENT
Start: 2020-06-22 | End: 2020-09-29 | Stop reason: SDUPTHER

## 2020-06-24 ENCOUNTER — TELEPHONE (OUTPATIENT)
Dept: INTERNAL MEDICINE CLINIC | Age: 70
End: 2020-06-24

## 2020-06-24 NOTE — TELEPHONE ENCOUNTER
Patient fell asleep last night with her compression hose on and she woke up this morning with some blistering look between her foot and leg. She soaked it but it is still there. She want to know what she should do.

## 2020-06-27 RX ORDER — GLIMEPIRIDE 2 MG/1
TABLET ORAL
Qty: 60 TAB | Refills: 0 | Status: SHIPPED | OUTPATIENT
Start: 2020-06-27 | End: 2020-07-01 | Stop reason: SDUPTHER

## 2020-06-29 ENCOUNTER — TELEPHONE (OUTPATIENT)
Dept: INTERNAL MEDICINE CLINIC | Age: 70
End: 2020-06-29

## 2020-06-29 RX ORDER — MUPIROCIN CALCIUM 20 MG/G
CREAM TOPICAL 2 TIMES DAILY
Qty: 15 G | Refills: 0 | Status: SHIPPED | OUTPATIENT
Start: 2020-06-29 | End: 2021-03-08 | Stop reason: ALTCHOICE

## 2020-06-29 NOTE — TELEPHONE ENCOUNTER
Patient agreeable to trying cream- please send pended Rx to [facility]  Requested Prescriptions     Pending Prescriptions Disp Refills    mupirocin calcium (BACTROBAN) 2 % topical cream 15 g 0     Sig: Apply  to affected area two (2) times a day.

## 2020-06-29 NOTE — TELEPHONE ENCOUNTER
Patient called     Still complaining about the blistering on her feet.     States one of the blisters popped and is turning red     [phone]

## 2020-06-29 NOTE — TELEPHONE ENCOUNTER
We can call in Bactroban cream to be applied to this area.   Would recommend a dermatology appointment for evaluation

## 2020-07-01 RX ORDER — GLIMEPIRIDE 2 MG/1
TABLET ORAL
Qty: 60 TAB | Refills: 3 | Status: SHIPPED | OUTPATIENT
Start: 2020-07-01 | End: 2020-09-29 | Stop reason: SDUPTHER

## 2020-07-01 RX ORDER — TRIAMCINOLONE ACETONIDE 1 MG/G
CREAM TOPICAL 2 TIMES DAILY
Qty: 15 G | Refills: 0 | Status: SHIPPED | OUTPATIENT
Start: 2020-07-01 | End: 2020-08-12

## 2020-07-01 RX ORDER — CYCLOBENZAPRINE HCL 10 MG
10 TABLET ORAL
Qty: 30 TAB | Refills: 0 | Status: SHIPPED | OUTPATIENT
Start: 2020-07-01 | End: 2020-08-25

## 2020-07-01 NOTE — TELEPHONE ENCOUNTER
Requested Prescriptions     Pending Prescriptions Disp Refills    triamcinolone acetonide (KENALOG) 0.1 % topical cream 15 g 0     Sig: Apply  to affected area two (2) times a day. use thin layer    cyclobenzaprine (FLEXERIL) 10 mg tablet 30 Tab 0     Sig: Take 1 Tab by mouth three (3) times daily as needed for Muscle Spasm(s).     glimepiride (AMARYL) 2 mg tablet 60 Tab 0     *Patient also requested Azithromycin 250mg    Not on current med list

## 2020-07-01 NOTE — TELEPHONE ENCOUNTER
Requested Prescriptions     Pending Prescriptions Disp Refills    triamcinolone acetonide (KENALOG) 0.1 % topical cream 15 g 0     Sig: Apply  to affected area two (2) times a day. use thin layer    cyclobenzaprine (FLEXERIL) 10 mg tablet 30 Tab 0     Sig: Take 1 Tab by mouth three (3) times daily as needed for Muscle Spasm(s).     glimepiride (AMARYL) 2 mg tablet 60 Tab 3     Sig: Take 1 tablet by mouth twice daily       Last Refill: 3-5-20  Last visit:4/30/2020

## 2020-08-09 ENCOUNTER — HOSPITAL ENCOUNTER (INPATIENT)
Age: 70
LOS: 3 days | Discharge: HOME | DRG: 291 | End: 2020-08-12
Attending: EMERGENCY MEDICINE | Admitting: FAMILY MEDICINE
Payer: MEDICARE

## 2020-08-09 ENCOUNTER — APPOINTMENT (OUTPATIENT)
Dept: CT IMAGING | Age: 70
DRG: 291 | End: 2020-08-09
Attending: FAMILY MEDICINE
Payer: MEDICARE

## 2020-08-09 ENCOUNTER — APPOINTMENT (OUTPATIENT)
Dept: GENERAL RADIOLOGY | Age: 70
DRG: 291 | End: 2020-08-09
Attending: EMERGENCY MEDICINE
Payer: MEDICARE

## 2020-08-09 DIAGNOSIS — I50.9 ACUTE ON CHRONIC CONGESTIVE HEART FAILURE, UNSPECIFIED HEART FAILURE TYPE (HCC): Primary | ICD-10-CM

## 2020-08-09 PROBLEM — J96.90 RESPIRATORY FAILURE (HCC): Status: ACTIVE | Noted: 2020-08-09

## 2020-08-09 LAB
ALBUMIN SERPL-MCNC: 3.3 G/DL (ref 3.5–5)
ALBUMIN/GLOB SERPL: 0.9 {RATIO} (ref 1.1–2.2)
ALP SERPL-CCNC: 176 U/L (ref 45–117)
ALT SERPL-CCNC: 34 U/L (ref 12–78)
ANION GAP SERPL CALC-SCNC: 3 MMOL/L (ref 5–15)
APTT PPP: 32.2 SEC (ref 22.1–32)
ARTERIAL PATENCY WRIST A: YES
AST SERPL-CCNC: 21 U/L (ref 15–37)
BASE EXCESS BLD CALC-SCNC: 0 MMOL/L
BASOPHILS # BLD: 0 K/UL (ref 0–0.1)
BASOPHILS NFR BLD: 1 % (ref 0–1)
BDY SITE: ABNORMAL
BILIRUB SERPL-MCNC: 0.8 MG/DL (ref 0.2–1)
BNP SERPL-MCNC: 2282 PG/ML
BUN SERPL-MCNC: 16 MG/DL (ref 6–20)
BUN/CREAT SERPL: 12 (ref 12–20)
CA-I BLD-SCNC: 1.21 MMOL/L (ref 1.12–1.32)
CALCIUM SERPL-MCNC: 8.4 MG/DL (ref 8.5–10.1)
CHLORIDE SERPL-SCNC: 109 MMOL/L (ref 97–108)
CO2 SERPL-SCNC: 31 MMOL/L (ref 21–32)
COMMENT, HOLDF: NORMAL
COVID-19 RAPID TEST, COVR: NOT DETECTED
CREAT SERPL-MCNC: 1.36 MG/DL (ref 0.55–1.02)
DIFFERENTIAL METHOD BLD: ABNORMAL
EOSINOPHIL # BLD: 0.3 K/UL (ref 0–0.4)
EOSINOPHIL NFR BLD: 4 % (ref 0–7)
ERYTHROCYTE [DISTWIDTH] IN BLOOD BY AUTOMATED COUNT: 18.8 % (ref 11.5–14.5)
GAS FLOW.O2 O2 DELIVERY SYS: ABNORMAL L/MIN
GAS FLOW.O2 SETTING OXYMISER: 15 L/M
GLOBULIN SER CALC-MCNC: 3.7 G/DL (ref 2–4)
GLUCOSE BLD STRIP.AUTO-MCNC: 364 MG/DL (ref 65–100)
GLUCOSE SERPL-MCNC: 178 MG/DL (ref 65–100)
HCO3 BLD-SCNC: 25.5 MMOL/L (ref 22–26)
HCT VFR BLD AUTO: 39 % (ref 35–47)
HEALTH STATUS, XMCV2T: NORMAL
HGB BLD-MCNC: 11.3 G/DL (ref 11.5–16)
IMM GRANULOCYTES # BLD AUTO: 0.1 K/UL (ref 0–0.04)
IMM GRANULOCYTES NFR BLD AUTO: 1 % (ref 0–0.5)
LACTATE BLD-SCNC: 1.83 MMOL/L (ref 0.4–2)
LYMPHOCYTES # BLD: 0.9 K/UL (ref 0.8–3.5)
LYMPHOCYTES NFR BLD: 11 % (ref 12–49)
MCH RBC QN AUTO: 26.3 PG (ref 26–34)
MCHC RBC AUTO-ENTMCNC: 29 G/DL (ref 30–36.5)
MCV RBC AUTO: 90.7 FL (ref 80–99)
MONOCYTES # BLD: 0.5 K/UL (ref 0–1)
MONOCYTES NFR BLD: 5 % (ref 5–13)
NEUTS SEG # BLD: 6.6 K/UL (ref 1.8–8)
NEUTS SEG NFR BLD: 78 % (ref 32–75)
NRBC # BLD: 0.03 K/UL (ref 0–0.01)
NRBC BLD-RTO: 0.4 PER 100 WBC
PCO2 BLD: 45.5 MMHG (ref 35–45)
PH BLD: 7.36 [PH] (ref 7.35–7.45)
PLATELET # BLD AUTO: 193 K/UL (ref 150–400)
PMV BLD AUTO: 11.3 FL (ref 8.9–12.9)
PO2 BLD: 71 MMHG (ref 80–100)
POTASSIUM SERPL-SCNC: 3.8 MMOL/L (ref 3.5–5.1)
PROT SERPL-MCNC: 7 G/DL (ref 6.4–8.2)
RBC # BLD AUTO: 4.3 M/UL (ref 3.8–5.2)
SAMPLES BEING HELD,HOLD: NORMAL
SAO2 % BLD: 93 % (ref 92–97)
SERVICE CMNT-IMP: ABNORMAL
SODIUM SERPL-SCNC: 143 MMOL/L (ref 136–145)
SOURCE, COVRS: NORMAL
SPECIMEN SOURCE, FCOV2M: NORMAL
SPECIMEN TYPE, XMCV1T: NORMAL
SPECIMEN TYPE: ABNORMAL
THERAPEUTIC RANGE,PTTT: ABNORMAL SECS (ref 58–77)
TROPONIN I SERPL-MCNC: <0.05 NG/ML
WBC # BLD AUTO: 8.3 K/UL (ref 3.6–11)

## 2020-08-09 PROCEDURE — 5A09357 ASSISTANCE WITH RESPIRATORY VENTILATION, LESS THAN 24 CONSECUTIVE HOURS, CONTINUOUS POSITIVE AIRWAY PRESSURE: ICD-10-PCS | Performed by: EMERGENCY MEDICINE

## 2020-08-09 PROCEDURE — 82803 BLOOD GASES ANY COMBINATION: CPT

## 2020-08-09 PROCEDURE — 80053 COMPREHEN METABOLIC PANEL: CPT

## 2020-08-09 PROCEDURE — 94640 AIRWAY INHALATION TREATMENT: CPT

## 2020-08-09 PROCEDURE — 36600 WITHDRAWAL OF ARTERIAL BLOOD: CPT

## 2020-08-09 PROCEDURE — 71250 CT THORAX DX C-: CPT

## 2020-08-09 PROCEDURE — 82962 GLUCOSE BLOOD TEST: CPT

## 2020-08-09 PROCEDURE — 94660 CPAP INITIATION&MGMT: CPT

## 2020-08-09 PROCEDURE — 93005 ELECTROCARDIOGRAM TRACING: CPT

## 2020-08-09 PROCEDURE — 85025 COMPLETE CBC W/AUTO DIFF WBC: CPT

## 2020-08-09 PROCEDURE — 87635 SARS-COV-2 COVID-19 AMP PRB: CPT

## 2020-08-09 PROCEDURE — 74011000250 HC RX REV CODE- 250: Performed by: FAMILY MEDICINE

## 2020-08-09 PROCEDURE — 85730 THROMBOPLASTIN TIME PARTIAL: CPT

## 2020-08-09 PROCEDURE — 83605 ASSAY OF LACTIC ACID: CPT

## 2020-08-09 PROCEDURE — 96374 THER/PROPH/DIAG INJ IV PUSH: CPT

## 2020-08-09 PROCEDURE — 77030038269 HC DRN EXT URIN PURWCK BARD -A

## 2020-08-09 PROCEDURE — 96375 TX/PRO/DX INJ NEW DRUG ADDON: CPT

## 2020-08-09 PROCEDURE — 36415 COLL VENOUS BLD VENIPUNCTURE: CPT

## 2020-08-09 PROCEDURE — 74011250636 HC RX REV CODE- 250/636: Performed by: EMERGENCY MEDICINE

## 2020-08-09 PROCEDURE — 74011250637 HC RX REV CODE- 250/637: Performed by: FAMILY MEDICINE

## 2020-08-09 PROCEDURE — 74011636637 HC RX REV CODE- 636/637: Performed by: INTERNAL MEDICINE

## 2020-08-09 PROCEDURE — 74011000250 HC RX REV CODE- 250: Performed by: EMERGENCY MEDICINE

## 2020-08-09 PROCEDURE — 65660000001 HC RM ICU INTERMED STEPDOWN

## 2020-08-09 PROCEDURE — 99285 EMERGENCY DEPT VISIT HI MDM: CPT

## 2020-08-09 PROCEDURE — 83880 ASSAY OF NATRIURETIC PEPTIDE: CPT

## 2020-08-09 PROCEDURE — 84484 ASSAY OF TROPONIN QUANT: CPT

## 2020-08-09 PROCEDURE — 71045 X-RAY EXAM CHEST 1 VIEW: CPT

## 2020-08-09 RX ORDER — GUAIFENESIN 100 MG/5ML
81 LIQUID (ML) ORAL DAILY
Status: DISCONTINUED | OUTPATIENT
Start: 2020-08-10 | End: 2020-08-09 | Stop reason: SDUPTHER

## 2020-08-09 RX ORDER — IPRATROPIUM BROMIDE AND ALBUTEROL SULFATE 2.5; .5 MG/3ML; MG/3ML
3 SOLUTION RESPIRATORY (INHALATION)
Status: COMPLETED | OUTPATIENT
Start: 2020-08-09 | End: 2020-08-09

## 2020-08-09 RX ORDER — BUMETANIDE 0.25 MG/ML
1 INJECTION INTRAMUSCULAR; INTRAVENOUS EVERY 12 HOURS
Status: DISCONTINUED | OUTPATIENT
Start: 2020-08-09 | End: 2020-08-12 | Stop reason: HOSPADM

## 2020-08-09 RX ORDER — NITROGLYCERIN 0.4 MG/1
0.4 TABLET SUBLINGUAL
Status: DISCONTINUED | OUTPATIENT
Start: 2020-08-09 | End: 2020-08-09 | Stop reason: SDUPTHER

## 2020-08-09 RX ORDER — PANTOPRAZOLE SODIUM 40 MG/1
40 TABLET, DELAYED RELEASE ORAL
Status: DISCONTINUED | OUTPATIENT
Start: 2020-08-10 | End: 2020-08-12 | Stop reason: HOSPADM

## 2020-08-09 RX ORDER — AMLODIPINE BESYLATE 5 MG/1
2.5 TABLET ORAL DAILY
Status: DISCONTINUED | OUTPATIENT
Start: 2020-08-10 | End: 2020-08-11

## 2020-08-09 RX ORDER — INSULIN LISPRO 100 [IU]/ML
10 INJECTION, SOLUTION INTRAVENOUS; SUBCUTANEOUS ONCE
Status: COMPLETED | OUTPATIENT
Start: 2020-08-09 | End: 2020-08-09

## 2020-08-09 RX ORDER — DEXTROSE 50 % IN WATER (D50W) INTRAVENOUS SYRINGE
25-50 AS NEEDED
Status: DISCONTINUED | OUTPATIENT
Start: 2020-08-09 | End: 2020-08-11 | Stop reason: SDUPTHER

## 2020-08-09 RX ORDER — MAGNESIUM SULFATE 100 %
4 CRYSTALS MISCELLANEOUS AS NEEDED
Status: DISCONTINUED | OUTPATIENT
Start: 2020-08-09 | End: 2020-08-09 | Stop reason: SDUPTHER

## 2020-08-09 RX ORDER — GUAIFENESIN 100 MG/5ML
81 LIQUID (ML) ORAL DAILY
Status: DISCONTINUED | OUTPATIENT
Start: 2020-08-10 | End: 2020-08-12 | Stop reason: HOSPADM

## 2020-08-09 RX ORDER — SODIUM CHLORIDE 0.9 % (FLUSH) 0.9 %
5-40 SYRINGE (ML) INJECTION EVERY 8 HOURS
Status: DISCONTINUED | OUTPATIENT
Start: 2020-08-09 | End: 2020-08-09 | Stop reason: SDUPTHER

## 2020-08-09 RX ORDER — DEXAMETHASONE SODIUM PHOSPHATE 4 MG/ML
10 INJECTION, SOLUTION INTRA-ARTICULAR; INTRALESIONAL; INTRAMUSCULAR; INTRAVENOUS; SOFT TISSUE ONCE
Status: COMPLETED | OUTPATIENT
Start: 2020-08-09 | End: 2020-08-09

## 2020-08-09 RX ORDER — MAGNESIUM SULFATE 100 %
4 CRYSTALS MISCELLANEOUS AS NEEDED
Status: DISCONTINUED | OUTPATIENT
Start: 2020-08-09 | End: 2020-08-12 | Stop reason: HOSPADM

## 2020-08-09 RX ORDER — DABIGATRAN ETEXILATE 150 MG/1
150 CAPSULE ORAL 2 TIMES DAILY
Status: DISCONTINUED | OUTPATIENT
Start: 2020-08-09 | End: 2020-08-12 | Stop reason: HOSPADM

## 2020-08-09 RX ORDER — NITROGLYCERIN 0.4 MG/1
0.4 TABLET SUBLINGUAL
Status: DISCONTINUED | OUTPATIENT
Start: 2020-08-09 | End: 2020-08-10

## 2020-08-09 RX ORDER — SODIUM CHLORIDE 0.9 % (FLUSH) 0.9 %
5-40 SYRINGE (ML) INJECTION AS NEEDED
Status: DISCONTINUED | OUTPATIENT
Start: 2020-08-09 | End: 2020-08-12 | Stop reason: HOSPADM

## 2020-08-09 RX ORDER — ISOSORBIDE DINITRATE 20 MG/1
30 TABLET ORAL DAILY
Status: DISCONTINUED | OUTPATIENT
Start: 2020-08-10 | End: 2020-08-11

## 2020-08-09 RX ORDER — INSULIN LISPRO 100 [IU]/ML
INJECTION, SOLUTION INTRAVENOUS; SUBCUTANEOUS
Status: DISCONTINUED | OUTPATIENT
Start: 2020-08-09 | End: 2020-08-12 | Stop reason: HOSPADM

## 2020-08-09 RX ORDER — METOPROLOL SUCCINATE 50 MG/1
100 TABLET, EXTENDED RELEASE ORAL DAILY
Status: DISCONTINUED | OUTPATIENT
Start: 2020-08-10 | End: 2020-08-12 | Stop reason: HOSPADM

## 2020-08-09 RX ORDER — DEXTROSE 50 % IN WATER (D50W) INTRAVENOUS SYRINGE
12.5-25 AS NEEDED
Status: DISCONTINUED | OUTPATIENT
Start: 2020-08-09 | End: 2020-08-12 | Stop reason: HOSPADM

## 2020-08-09 RX ORDER — SODIUM CHLORIDE 0.9 % (FLUSH) 0.9 %
5-40 SYRINGE (ML) INJECTION EVERY 8 HOURS
Status: DISCONTINUED | OUTPATIENT
Start: 2020-08-09 | End: 2020-08-12 | Stop reason: HOSPADM

## 2020-08-09 RX ORDER — HYDRALAZINE HYDROCHLORIDE 25 MG/1
25 TABLET, FILM COATED ORAL 2 TIMES DAILY
Status: DISCONTINUED | OUTPATIENT
Start: 2020-08-09 | End: 2020-08-12 | Stop reason: HOSPADM

## 2020-08-09 RX ORDER — VENLAFAXINE HYDROCHLORIDE 150 MG/1
150 CAPSULE, EXTENDED RELEASE ORAL 2 TIMES DAILY WITH MEALS
Status: DISCONTINUED | OUTPATIENT
Start: 2020-08-09 | End: 2020-08-12 | Stop reason: HOSPADM

## 2020-08-09 RX ORDER — FUROSEMIDE 10 MG/ML
40 INJECTION INTRAMUSCULAR; INTRAVENOUS ONCE
Status: COMPLETED | OUTPATIENT
Start: 2020-08-09 | End: 2020-08-09

## 2020-08-09 RX ORDER — SODIUM CHLORIDE 0.9 % (FLUSH) 0.9 %
5-40 SYRINGE (ML) INJECTION AS NEEDED
Status: DISCONTINUED | OUTPATIENT
Start: 2020-08-09 | End: 2020-08-09 | Stop reason: SDUPTHER

## 2020-08-09 RX ORDER — ATORVASTATIN CALCIUM 40 MG/1
40 TABLET, FILM COATED ORAL
Status: DISCONTINUED | OUTPATIENT
Start: 2020-08-09 | End: 2020-08-12 | Stop reason: HOSPADM

## 2020-08-09 RX ADMIN — BUMETANIDE 1 MG: 0.25 INJECTION INTRAMUSCULAR; INTRAVENOUS at 22:04

## 2020-08-09 RX ADMIN — Medication 10 ML: at 19:00

## 2020-08-09 RX ADMIN — VENLAFAXINE HYDROCHLORIDE 150 MG: 150 CAPSULE, EXTENDED RELEASE ORAL at 18:56

## 2020-08-09 RX ADMIN — IPRATROPIUM BROMIDE AND ALBUTEROL SULFATE 3 ML: .5; 3 SOLUTION RESPIRATORY (INHALATION) at 11:43

## 2020-08-09 RX ADMIN — DABIGATRAN ETEXILATE MESYLATE 150 MG: 150 CAPSULE ORAL at 18:56

## 2020-08-09 RX ADMIN — FUROSEMIDE 40 MG: 10 INJECTION, SOLUTION INTRAMUSCULAR; INTRAVENOUS at 12:17

## 2020-08-09 RX ADMIN — ATORVASTATIN CALCIUM 40 MG: 40 TABLET, FILM COATED ORAL at 22:04

## 2020-08-09 RX ADMIN — Medication 10 ML: at 19:03

## 2020-08-09 RX ADMIN — INSULIN LISPRO 10 UNITS: 100 INJECTION, SOLUTION INTRAVENOUS; SUBCUTANEOUS at 22:04

## 2020-08-09 RX ADMIN — Medication 10 ML: at 22:04

## 2020-08-09 RX ADMIN — DEXAMETHASONE SODIUM PHOSPHATE 10 MG: 4 INJECTION, SOLUTION INTRAMUSCULAR; INTRAVENOUS at 12:17

## 2020-08-09 RX ADMIN — HYDRALAZINE HYDROCHLORIDE 25 MG: 25 TABLET, FILM COATED ORAL at 18:56

## 2020-08-09 NOTE — H&P
History & Physical    Primary Care Provider: Grace Robles MD  Source of Information: Patient     History of Presenting Illness:   Malvin Unger is a 81 y.o. female who presents with with SOB. History was obtained from the patient, although patient is a poor historian and currently on BiPAP    Patient started experiencing some shortness of breath that started 2 to 3 days back. Started experiencing increased lower extremity edema, trouble laying down, nausea and poor appetite. Today the shortness of breath got worse and patient decided to come to the hospital.  Patient was found to be in acute respiratory distress and was put on BiPAP in the ER and was requested to be admitted under the hospitalist service. Patient denies any fever or chills associated with her symptoms. Patient reports that her legs have been swelling up and she feels as if they are holding fluid. The patient denies any Headache, blurry vision, sore throat, trouble swallowing, trouble with speech, chest pain,cough, fever, chills, N/V/D, abd pain, urinary symptoms, constipation, recent travels, sick contacts, focal or generalized oneirological symptoms,  falls, injuries, rashes, contact with COVID-19 diagnosed patients, hematemesis, melena, hemoptysis, hematuria, rashes, denies starting any new medications and denies any other concerns or problems besides as mentioned above. Review of Systems:  A comprehensive review of systems was negative except for that written in the History of Present Illness.      Past Medical History:   Diagnosis Date    Anxiety 1/22/2018    Arthritis     OA    Asthma     Diabetes ([address]     Essential hypertension     GERD (gastroesophageal reflux disease)     Hypercholesterolemia 1/22/2018    Hypertension     Ill-defined condition     diverticulitis    Long-term use of high-risk medication 1/22/2018    Neuropathy     Obesity (BMI 30-39.9) 4/30/2019    Other ill-defined conditions(799.89)     IBS, spinal stenosis    Plantar fasciitis     Psychiatric disorder     depression, anxiety    Sleep apnea     uses CPAP    Type 2 diabetes mellitus with diabetic neuropathy, without long-term current use of insulin ([facility] [address] 6/5/2016      Past Surgical History:   Procedure Laterality Date    CARDIAC SURG PROCEDURE UNLIST      stent    COLONOSCOPY N/A 3/14/2018    COLONOSCOPY performed [facility] Kai [facility]le MD at [facility] ENDOSCOPY    HX APPENDECTOMY      HX CHOLECYSTECTOMY  11/15/2018    HX HYSTERECTOMY      HX PACEMAKER       Prior to Admission medications    Medication Sig Start Date End Date Taking? Authorizing Provider   magnesium oxide (MAG-OX) 400 mg tablet Take 1 tablet by mouth twice daily 7/24/20  Yes Ro Diane MD   triamcinolone acetonide (KENALOG) 0.1 % topical cream Apply  to affected area two (2) times a day. use thin layer 7/1/20  Yes Ro Diane MD   cyclobenzaprine (FLEXERIL) 10 mg tablet Take 1 Tab by mouth three (3) times daily as needed for Muscle Spasm(s). 7/1/20  Yes Ro Diane MD   glimepiride (AMARYL) 2 mg tablet Take 1 tablet by mouth twice daily 7/1/20  Yes Ro Diane MD   mupirocin calcium (BACTROBAN) 2 % topical cream Apply  to affected area two (2) times a day. 6/29/20  Yes Ro Diane MD   clotrimazole-betamethasone (LOTRISONE) topical cream APPLY TO THE AFFECTED AREA 2 TIMES DAILY 6/22/20  Yes Ro Diane MD   bumetanide (BUMEX) 2 mg tablet TAKE 1 TABLET EVERY DAY 5/31/20  Yes Ro Diane MD   omeprazole (PRILOSEC) 40 mg capsule TAKE 1 CAPSULE EVERY DAY 5/31/20  Yes Ro Diane MD   clonazePAM (KlonoPIN) 0.5 mg tablet TAKE 1 TABLET BY MOUTH ONCE DAILY AT NIGHT MAX  DAILY  DOSE  OF  0.5MG 5/22/20  Yes Ro Diane MD   potassium chloride SR (KLOR-CON 10) 10 mEq tablet Take 1 Tab by mouth three (3) times daily.  5/11/20  Yes Ro Diane MD isosorbide dinitrate (ISORDIL) 30 mg tablet Take 30 mg by mouth daily. Yes Provider, Historical   hydrALAZINE (APRESOLINE) 25 mg tablet Take 25 mg by mouth two (2) times a day. Yes Provider, Historical   calcium carb/vit D2/minerals (CALTRATE PLUS PO) Take  by mouth daily. Yes Provider, Historical   SYMBICORT 160-4.5 mcg/actuation HFAA INHALE 2 PUFFS BY MOUTH TWICE DAILY 10/1/19  Yes Leyla Powers MD   albuterol (PROVENTIL HFA, VENTOLIN HFA, PROAIR HFA) 90 mcg/actuation inhaler Take 1 Puff by inhalation every four (4) hours as needed for Wheezing. 8/17/19  Yes Kika Walton MD   lisinopril (PRINIVIL, ZESTRIL) 40 mg tablet Take 40 mg by mouth daily. Yes Provider, Historical   diphenhydrAMINE (BENADRYL) 25 mg capsule Take 25 mg by mouth two (2) times a day. Yes Provider, Historical   nystatin (MYCOSTATIN) topical cream Apply  to affected area two (2) times daily as needed for Skin Irritation (Rash). Yes Provider, Historical   traMADol (ULTRAM) 50 mg tablet Take 50 mg by mouth daily as needed for Pain (Used to supplement the Lyrica when lyrica doesnt manage the pain on its own). Yes Provider, Historical   dabigatran etexilate (PRADAXA) 150 mg capsule Take 1 Cap by mouth two (2) times a day. IF NO BLEEDING AT CATH SITE. 2/24/19  Yes Tasneem Ramos MD   amLODIPine (NORVASC) 2.5 mg tablet Take 1 Tab by mouth daily. Patient taking differently: Take 10 mg by mouth daily. 11/20/18  Yes Leyla Powers MD   metoprolol succinate (TOPROL-XL) 100 mg tablet Take 1 Tab by mouth daily. 11/19/18  Yes Manish Nicole MD   acetaminophen (TYLENOL EXTRA STRENGTH) 500 mg tablet Take 1,000 mg by mouth every eight (8) hours as needed for Pain (Headache). Yes Provider, Historical   lidocaine (ASPERCREME, LIDOCAINE,) 4 % topical cream Apply  to affected area daily as needed for Pain (Knee pain).    Yes Provider, Historical   sodium chloride (AYR SALINE) 0.65 % nasal squeeze bottle 2 Sprays by Both Nostrils route every eight (8) hours as needed. Yes Provider, Historical   venlafaxine-SR (EFFEXOR XR) 150 mg capsule Take 150 mg by mouth two (2) times daily (with meals). Yes Provider, Historical   pregabalin (LYRICA) 200 mg capsule Take 200 mg by mouth two (2) times a day. Yes Other, MD Ernie   cholecalciferol, vitamin d3, (VITAMIN D3) 400 unit cap Take 400 Units by mouth daily. Yes Other, MD Ernie   aspirin 81 mg chewable tablet Take 81 mg by mouth daily. Yes Provider, Historical   atorvastatin (LIPITOR) 40 mg tablet Take 1 Tab by mouth nightly. 4/9/14  Yes Darcie Schirmer, MD   conjugated estrogens (PREMARIN) 0.625 mg/gram vaginal cream Insert 0.5 g into vagina daily as needed.  Tuesdays and Thursdays     Provider, Historical     Allergies   Allergen Reactions    Sulfa (Sulfonamide Antibiotics) Swelling    Amoxicillin Swelling      Family History   Problem Relation Age of Onset   [address] [facility] Arthritis-osteo Mother     Hypertension Mother     High Cholesterol Mother     Crohn's Disease Mother     Heart Disease Mother     Alcohol abuse Father     High Cholesterol Sister     Hypertension Sister     Thyroid Disease Sister     COPD Sister     High Cholesterol Brother     Hypertension Brother     COPD Brother     COPD Child     Inflammatory Bowel Dz Child         SOCIAL HISTORY:  Patient resides:  Independently x   Assisted Living    SNF    With family care       Smoking history:   None x   Former    Chronic      Alcohol history:   None    Social x   Chronic      Ambulates:   Independently x   w/cane    w/walker    w/wc    CODE STATUS:  DNR    Full x   Other      Objective:     Physical Exam:     Visit Vitals  /88 (BP 1 Location: Left arm, BP Patient Position: At rest)   Pulse 80   Temp 98.8 °F (37.1 °C)   Resp 21   Ht 5' 4\" (1.626 m)   Wt 127.9 kg (282 lb)   SpO2 95%   BMI 48.41 kg/m²    O2 Flow Rate (L/min): 15 l/min O2 Device: BIPAP    General : alert x 2, awake, moderately distressed, on BiPAP, elderly female  HEENT: PEERL, EOMI, moist mucus membrane, TM clear  Neck: supple, no JVD, no meningeal signs  Chest: Rales bilateral lung bases  CVS: S1 S2 heard, Capillary refill less than 2 seconds  Abd: soft/ Non tender, non distended, BS physiological,   Ext: no clubbing, no cyanosis, 2+ pitting edema bilaterally  Neuro/Psych: pleasant mood and affect, CN 2-12 grossly intact,   Skin: warm        EKG: Ventricular paced rhythm with nonspecific ST changes. Data Review:     Recent Days:  Recent Labs     08/09/20  1140   WBC 8.3   HGB 11.3*   HCT 39.0        Recent Labs     08/09/20  1140      K 3.8   *   CO2 31   *   BUN 16   CREA 1.36*   CA 8.4*   ALB 3.3*   ALT 34     No results for input(s): PH, PCO2, PO2, HCO3, FIO2 in the last 72 hours. 24 Hour Results:  Recent Results (from the past 24 hour(s))   POC EG7    Collection Time: 08/09/20 11:34 AM   Result Value Ref Range    Calcium, ionized (POC) 1.21 1.12 - 1.32 mmol/L    pH (POC) 7.36 7.35 - 7.45      pCO2 (POC) 45.5 (H) 35.0 - 45.0 MMHG    pO2 (POC) 71 (L) 80 - 100 MMHG    HCO3 (POC) 25.5 22 - 26 MMOL/L    Base excess (POC) 0 mmol/L    sO2 (POC) 93 92 - 97 %    Site RIGHT RADIAL      Device: Non rebreather      Flow rate (POC) 15 L/M    Allens test (POC) YES      Specimen type (POC) ARTERIAL     CBC WITH AUTOMATED DIFF    Collection Time: 08/09/20 11:40 AM   Result Value Ref Range    WBC 8.3 3.6 - 11.0 K/uL    RBC 4.30 3.80 - 5.20 M/uL    HGB 11.3 (L) 11.5 - 16.0 g/dL    HCT 39.0 35.0 - 47.0 %    MCV 90.7 80.0 - 99.0 FL    MCH 26.3 26.0 - 34.0 PG    MCHC 29.0 (L) 30.0 - 36.5 g/dL    RDW 18.8 (H) 11.5 - 14.5 %    PLATELET 015 876 - 143 K/uL    MPV 11.3 8.9 - 12.9 FL    NRBC 0.4 (H) 0  WBC    ABSOLUTE NRBC 0.03 (H) 0.00 - 0.01 K/uL    NEUTROPHILS 78 (H) 32 - 75 %    LYMPHOCYTES 11 (L) 12 - 49 %    MONOCYTES 5 5 - 13 %    EOSINOPHILS 4 0 - 7 %    BASOPHILS 1 0 - 1 %    IMMATURE GRANULOCYTES 1 (H) 0.0 - 0.5 %    ABS. NEUTROPHILS 6.6 1.8 - 8.0 K/UL    ABS. LYMPHOCYTES 0.9 0.8 - 3.5 K/UL    ABS. MONOCYTES 0.5 0.0 - 1.0 K/UL    ABS. EOSINOPHILS 0.3 0.0 - 0.4 K/UL    ABS. BASOPHILS 0.0 0.0 - 0.1 K/UL    ABS. IMM. GRANS. 0.1 (H) 0.00 - 0.04 K/UL    DF AUTOMATED     METABOLIC PANEL, COMPREHENSIVE    Collection Time: 08/09/20 11:40 AM   Result Value Ref Range    Sodium 143 136 - 145 mmol/L    Potassium 3.8 3.5 - 5.1 mmol/L    Chloride 109 (H) 97 - 108 mmol/L    CO2 31 21 - 32 mmol/L    Anion gap 3 (L) 5 - 15 mmol/L    Glucose 178 (H) 65 - 100 mg/dL    BUN 16 6 - 20 MG/DL    Creatinine 1.36 (H) 0.55 - 1.02 MG/DL    BUN/Creatinine ratio 12 12 - 20      GFR est AA 47 (L) >60 ml/min/1.73m2    GFR est non-AA 39 (L) >60 ml/min/1.73m2    Calcium 8.4 (L) 8.5 - 10.1 MG/DL    Bilirubin, total 0.8 0.2 - 1.0 MG/DL    ALT (SGPT) 34 12 - 78 U/L    AST (SGOT) 21 15 - 37 U/L    Alk. phosphatase 176 (H) 45 - 117 U/L    Protein, total 7.0 6.4 - 8.2 g/dL    Albumin 3.3 (L) 3.5 - 5.0 g/dL    Globulin 3.7 2.0 - 4.0 g/dL    A-G Ratio 0.9 (L) 1.1 - 2.2     NT-PRO BNP    Collection Time: 08/09/20 11:40 AM   Result Value Ref Range    NT pro-BNP 2,282 (H) <125 PG/ML   TROPONIN I    Collection Time: 08/09/20 11:40 AM   Result Value Ref Range    Troponin-I, Qt. <0.05 <0.05 ng/mL   SAMPLES BEING HELD    Collection Time: 08/09/20 11:40 AM   Result Value Ref Range    SAMPLES BEING HELD RED,DHARMESH,GOLD     COMMENT        Add-on orders for these samples will be processed based on acceptable specimen integrity and analyte stability, which may vary by analyte.    PTT    Collection Time: 08/09/20 11:40 AM   Result Value Ref Range    aPTT 32.2 (H) 22.1 - 32.0 sec    aPTT, therapeutic range     58.0 - 77.0 SECS   SARS-COV-2    Collection Time: 08/09/20 11:42 AM   Result Value Ref Range    Specimen source Nasopharyngeal      Specimen source Nasopharyngeal      COVID-19 rapid test Not detected NOTD      Specimen type NP Swab      Health status Symptomatic Testing     POC LACTIC ACID    Collection Time: 08/09/20 12:09 PM   Result Value Ref Range    Lactic Acid (POC) 1.83 0.40 - 2.00 mmol/L         Imaging:   Xr Chest Port    Result Date: 8/9/2020  IMPRESSION: Questionable diffuse interstitial prominence. Assessment/Plan      Acute hypoxic respiratory failure: Likely secondary to acute on chronic systolic congestive heart failure NYHA class IV, patient will be admitted on a telemetry bed, IV diuresis, strict I's and O's, daily weights, CT of the chest, pulse ox monitoring, telemetry, cycle troponins, aspirin, oxygen support, currently patient on BiPAP, repeat ABG ABG, cardiology consult and further intervention per hospital course reassess as needed. Diabetes mellitus type 2: Poorly controlled, sliding scale insulin, Accu-Cheks, diet control, continue basal insulin, supportive care and close monitoring.     CKD stage III: Avoid nephrotoxic medication, monitor renal function, trend creatinine    Hyperlipidemia: Continue home medication    Patient reports that she takes Pradaxa twice a day, she is not sure as to why    GI DVT prophylaxis: Patient is on Pradaxa               Signed By: Nanci Severance, MD     August 9, 2020

## 2020-08-09 NOTE — PROGRESS NOTES
1631: TRANSFER - IN REPORT:    Verbal report received from Alphonso RN(name) on Alma Rosa Ross  being received from ED(unit) for routine progression of care      Report consisted of patients Situation, Background, Assessment and   Recommendations(SBAR). Information from the following report(s) SBAR, Kardex, Intake/Output and MAR was reviewed with the receiving nurse. Opportunity for questions and clarification was provided. Assessment completed upon patients arrival to unit and care assumed.

## 2020-08-09 NOTE — ED NOTES
TRANSFER - OUT REPORT:    Verbal report given to [facility] (name) on [facility]  being transferred to [facility](unit) for routine progression of care       Report consisted of patients Situation, Background, Assessment and   Recommendations(SBAR). Information from the following report(s) SBAR, Kardex and ED Summary was reviewed with the receiving nurse. Lines:   Peripheral IV 08/09/20 Right Antecubital (Active)   Site Assessment Clean, dry, & intact 08/09/20 1209   Phlebitis Assessment 0 08/09/20 1209   Infiltration Assessment 0 08/09/20 1209   Dressing Status Clean, dry, & intact 08/09/20 1209        Opportunity for questions and clarification was provided.       Patient transported with:   Monitor  O2 @ 3 liters  Registered Nurse  [facility]

## 2020-08-09 NOTE — ED NOTES
1130 pt arrived via EMS for c/o Shortness of breath. Pt pn non-rebreather with hx of CHF and was recently doubled on her Lasix at home. MD evaluated pt at bedside. Pt has course wheezing present, is pale diaphoretic and working to breath. Notified respiratory of need for BiPap and nebs. 1140 labs obtained  1237 Xray to the bedside. Shane TraciAvery pt on Purewick  1445 answered pt Call bell emptied 1200 of clear yellow urine from canister. 1545 turned and cleaned pt. Replaced purewick and placed on pad.   2420 RT at bedside.

## 2020-08-09 NOTE — ED PROVIDER NOTES
EMERGENCY DEPARTMENT HISTORY AND PHYSICAL EXAM      Date: 8/9/2020  Patient Name: Gris Jacob    History of Presenting Illness     Chief Complaint   Patient presents with    Shortness of Breath     PT has hx of CHF with wheezing today. SPO2 was 80% on Room air. pt working to breath place on Non rebreather by EMS. HPI: Gris Jacob, 81 y.o. female presents to the ED with cc of shortness of breath. She has a history of congestive heart failure and has noted herself to become worsening short of breath over the last few days. She also notes bilateral lower extremity edema. She denies any chest pain or fevers. She denies cough. No known sick contacts. She has been making a normal amount of urine. There are no other complaints, changes, or physical findings at this time. PCP: Neo Easley MD    No current facility-administered medications on file prior to encounter. Current Outpatient Medications on File Prior to Encounter   Medication Sig Dispense Refill    magnesium oxide (MAG-OX) 400 mg tablet Take 1 tablet by mouth twice daily 60 Tab 3    triamcinolone acetonide (KENALOG) 0.1 % topical cream Apply  to affected area two (2) times a day. use thin layer 15 g 0    cyclobenzaprine (FLEXERIL) 10 mg tablet Take 1 Tab by mouth three (3) times daily as needed for Muscle Spasm(s). 30 Tab 0    glimepiride (AMARYL) 2 mg tablet Take 1 tablet by mouth twice daily 60 Tab 3    mupirocin calcium (BACTROBAN) 2 % topical cream Apply  to affected area two (2) times a day.  15 g 0    clotrimazole-betamethasone (LOTRISONE) topical cream APPLY TO THE AFFECTED AREA 2 TIMES DAILY 15 g 0    bumetanide (BUMEX) 2 mg tablet TAKE 1 TABLET EVERY DAY 90 Tab 3    omeprazole (PRILOSEC) 40 mg capsule TAKE 1 CAPSULE EVERY DAY 90 Cap 3    clonazePAM (KlonoPIN) 0.5 mg tablet TAKE 1 TABLET BY MOUTH ONCE DAILY AT NIGHT MAX  DAILY  DOSE  OF  0.5MG 90 Tab 0    potassium chloride SR (KLOR-CON 10) 10 mEq tablet Take 1 Tab by mouth three (3) times daily. 540 Tab 3    isosorbide dinitrate (ISORDIL) 30 mg tablet Take 30 mg by mouth daily.  hydrALAZINE (APRESOLINE) 25 mg tablet Take 25 mg by mouth two (2) times a day.  calcium carb/vit D2/minerals (CALTRATE PLUS PO) Take  by mouth daily.  SYMBICORT 160-4.5 mcg/actuation HFAA INHALE 2 PUFFS BY MOUTH TWICE DAILY 1 Inhaler 3    albuterol (PROVENTIL HFA, VENTOLIN HFA, PROAIR HFA) 90 mcg/actuation inhaler Take 1 Puff by inhalation every four (4) hours as needed for Wheezing. 1 Inhaler 6    lisinopril (PRINIVIL, ZESTRIL) 40 mg tablet Take 40 mg by mouth daily.  diphenhydrAMINE (BENADRYL) 25 mg capsule Take 25 mg by mouth two (2) times a day.  nystatin (MYCOSTATIN) topical cream Apply  to affected area two (2) times daily as needed for Skin Irritation (Rash).  traMADol (ULTRAM) 50 mg tablet Take 50 mg by mouth daily as needed for Pain (Used to supplement the Lyrica when lyrica doesnt manage the pain on its own).  dabigatran etexilate (PRADAXA) 150 mg capsule Take 1 Cap by mouth two (2) times a day. IF NO BLEEDING AT CATH SITE. 60 Cap 12    amLODIPine (NORVASC) 2.5 mg tablet Take 1 Tab by mouth daily. (Patient taking differently: Take 10 mg by mouth daily.) 90 Tab 3    metoprolol succinate (TOPROL-XL) 100 mg tablet Take 1 Tab by mouth daily. 30 Tab prn    acetaminophen (TYLENOL EXTRA STRENGTH) 500 mg tablet Take 1,000 mg by mouth every eight (8) hours as needed for Pain (Headache).  lidocaine (ASPERCREME, LIDOCAINE,) 4 % topical cream Apply  to affected area daily as needed for Pain (Knee pain).  sodium chloride (AYR SALINE) 0.65 % nasal squeeze bottle 2 Sprays by Both Nostrils route every eight (8) hours as needed.  venlafaxine-SR (EFFEXOR XR) 150 mg capsule Take 150 mg by mouth two (2) times daily (with meals).  pregabalin (LYRICA) 200 mg capsule Take 200 mg by mouth two (2) times a day.       cholecalciferol, vitamin d3, (VITAMIN D3) 400 unit cap Take 400 Units by mouth daily.  aspirin 81 mg chewable tablet Take 81 mg by mouth daily.  atorvastatin (LIPITOR) 40 mg tablet Take 1 Tab by mouth nightly. 30 Tab 12    conjugated estrogens (PREMARIN) 0.625 mg/gram vaginal cream Insert 0.5 g into vagina daily as needed.  Tuesdays and Thursdays          Past History     Past Medical History:  Past Medical History:   Diagnosis Date    Anxiety 1/22/2018    Arthritis     OA    Asthma     Diabetes ([facility] [address]     Essential hypertension     GERD (gastroesophageal reflux disease)     Hypercholesterolemia 1/22/2018    Hypertension     Ill-defined condition     diverticulitis    Long-term use of high-risk medication 1/22/2018    Neuropathy     Obesity (BMI 30-39.9) 4/30/2019    Other ill-defined conditions(799.89)     IBS, spinal stenosis    Plantar fasciitis     Psychiatric disorder     depression, anxiety    Sleep apnea     uses CPAP    Type 2 diabetes mellitus with diabetic neuropathy, without long-term current use of insulin ([facility] [address] 6/5/2016       Past Surgical History:  Past Surgical History:   Procedure Laterality Date    CARDIAC SURG PROCEDURE UNLIST      stent    COLONOSCOPY N/A 3/14/2018    COLONOSCOPY performed by Osmar S[facility] at [facility] ENDOSCOPY    HX APPENDECTOMY      HX CHOLECYSTECTOMY  11/15/2018    HX HYSTERECTOMY      HX PACEMAKER         Family History:  Family History   Problem Relation Age of Onset    Arthritis-osteo Mother     Hypertension Mother     High Cholesterol Mother    [facility] Crohn's Disease Mother     Heart Disease Mother     Alcohol abuse Father     High Cholesterol Sister     Hypertension Sister     Thyroid Disease Sister     COPD Sister     High Cholesterol Brother     Hypertension Brother     COPD Brother     COPD Child     Inflammatory Bowel Dz Child        Social History:  Social History     Tobacco Use    Smoking status: Never Smoker    Smokeless tobacco: Never Used   Substance Use Topics    Alcohol use: No    Drug use: No       Allergies: Allergies   Allergen Reactions    Sulfa (Sulfonamide Antibiotics) Swelling    Amoxicillin Swelling         Review of Systems   Review of Systems   Constitutional: Negative for chills and fever. HENT: Negative for rhinorrhea. Eyes: Negative for redness. Respiratory: Positive for shortness of breath. Cardiovascular: Positive for leg swelling. Negative for chest pain. Gastrointestinal: Negative for abdominal pain. Genitourinary: Negative for dysuria. Musculoskeletal: Negative for back pain. Neurological: Negative for syncope. Psychiatric/Behavioral: The patient is not nervous/anxious. All other systems reviewed and are negative. Physical Exam   Physical Exam  Vitals signs and nursing note reviewed. Constitutional:       Appearance: Normal appearance. HENT:      Head: Normocephalic and atraumatic. Mouth/Throat:      Mouth: Mucous membranes are moist.   Eyes:      Extraocular Movements: Extraocular movements intact. Neck:      Musculoskeletal: Neck supple. Cardiovascular:      Rate and Rhythm: Normal rate and regular rhythm. Pulses: Normal pulses. Pulmonary:      Effort: Pulmonary effort is normal.      Breath sounds: Rales (bilat) present. Comments: Tachypnea without significant distress  Abdominal:      Palpations: Abdomen is soft. Tenderness: There is no abdominal tenderness. Musculoskeletal:         General: No deformity. Skin:     General: Skin is warm and dry. Neurological:      General: No focal deficit present. Mental Status: She is alert.    Psychiatric:         Mood and Affect: Mood normal.         Behavior: Behavior normal.         Diagnostic Study Results     Labs -     Recent Results (from the past 24 hour(s))   POC EG7    Collection Time: 08/09/20 11:34 AM   Result Value Ref Range    Calcium, ionized (POC) 1.21 1.12 - 1.32 mmol/L    pH (POC) 7.36 7.35 - 7.45      pCO2 (POC) 45.5 (H) 35.0 - 45.0 MMHG    pO2 (POC) 71 (L) 80 - 100 MMHG    HCO3 (POC) 25.5 22 - 26 MMOL/L    Base excess (POC) 0 mmol/L    sO2 (POC) 93 92 - 97 %    Site RIGHT RADIAL      Device: Non rebreather      Flow rate (POC) 15 L/M    Allens test (POC) YES      Specimen type (POC) ARTERIAL     CBC WITH AUTOMATED DIFF    Collection Time: 08/09/20 11:40 AM   Result Value Ref Range    WBC 8.3 3.6 - 11.0 K/uL    RBC 4.30 3.80 - 5.20 M/uL    HGB 11.3 (L) 11.5 - 16.0 g/dL    HCT 39.0 35.0 - 47.0 %    MCV 90.7 80.0 - 99.0 FL    MCH 26.3 26.0 - 34.0 PG    MCHC 29.0 (L) 30.0 - 36.5 g/dL    RDW 18.8 (H) 11.5 - 14.5 %    PLATELET 216 995 - 735 K/uL    MPV 11.3 8.9 - 12.9 FL    NRBC 0.4 (H) 0  WBC    ABSOLUTE NRBC 0.03 (H) 0.00 - 0.01 K/uL    NEUTROPHILS 78 (H) 32 - 75 %    LYMPHOCYTES 11 (L) 12 - 49 %    MONOCYTES 5 5 - 13 %    EOSINOPHILS 4 0 - 7 %    BASOPHILS 1 0 - 1 %    IMMATURE GRANULOCYTES 1 (H) 0.0 - 0.5 %    ABS. NEUTROPHILS 6.6 1.8 - 8.0 K/UL    ABS. LYMPHOCYTES 0.9 0.8 - 3.5 K/UL    ABS. MONOCYTES 0.5 0.0 - 1.0 K/UL    ABS. EOSINOPHILS 0.3 0.0 - 0.4 K/UL    ABS. BASOPHILS 0.0 0.0 - 0.1 K/UL    ABS. IMM. GRANS. 0.1 (H) 0.00 - 0.04 K/UL    DF AUTOMATED     METABOLIC PANEL, COMPREHENSIVE    Collection Time: 08/09/20 11:40 AM   Result Value Ref Range    Sodium 143 136 - 145 mmol/L    Potassium 3.8 3.5 - 5.1 mmol/L    Chloride 109 (H) 97 - 108 mmol/L    CO2 31 21 - 32 mmol/L    Anion gap 3 (L) 5 - 15 mmol/L    Glucose 178 (H) 65 - 100 mg/dL    BUN 16 6 - 20 MG/DL    Creatinine 1.36 (H) 0.55 - 1.02 MG/DL    BUN/Creatinine ratio 12 12 - 20      GFR est AA 47 (L) >60 ml/min/1.73m2    GFR est non-AA 39 (L) >60 ml/min/1.73m2    Calcium 8.4 (L) 8.5 - 10.1 MG/DL    Bilirubin, total 0.8 0.2 - 1.0 MG/DL    ALT (SGPT) 34 12 - 78 U/L    AST (SGOT) 21 15 - 37 U/L    Alk.  phosphatase 176 (H) 45 - 117 U/L    Protein, total 7.0 6.4 - 8.2 g/dL    Albumin 3.3 (L) 3.5 - 5.0 g/dL    Globulin 3.7 2.0 - 4.0 g/dL    A-G Ratio 0.9 (L) 1.1 - 2.2     NT-PRO BNP    Collection Time: 08/09/20 11:40 AM   Result Value Ref Range    NT pro-BNP 2,282 (H) <125 PG/ML   TROPONIN I    Collection Time: 08/09/20 11:40 AM   Result Value Ref Range    Troponin-I, Qt. <0.05 <0.05 ng/mL   SAMPLES BEING HELD    Collection Time: 08/09/20 11:40 AM   Result Value Ref Range    SAMPLES BEING HELD RED,DHARMESH,GOLD     COMMENT        Add-on orders for these samples will be processed based on acceptable specimen integrity and analyte stability, which may vary by analyte. PTT    Collection Time: 08/09/20 11:40 AM   Result Value Ref Range    aPTT 32.2 (H) 22.1 - 32.0 sec    aPTT, therapeutic range     58.0 - 77.0 SECS   SARS-COV-2    Collection Time: 08/09/20 11:42 AM   Result Value Ref Range    Specimen source Nasopharyngeal      Specimen source Nasopharyngeal      COVID-19 rapid test Not detected NOTD      Specimen type NP Swab      Health status Symptomatic Testing     POC LACTIC ACID    Collection Time: 08/09/20 12:09 PM   Result Value Ref Range    Lactic Acid (POC) 1.83 0.40 - 2.00 mmol/L       Radiologic Studies -   XR CHEST PORT   Final Result   IMPRESSION:      Questionable diffuse interstitial prominence. CT Results  (Last 48 hours)    None        CXR Results  (Last 48 hours)               08/09/20 1230  XR CHEST PORT Final result    Impression:  IMPRESSION:       Questionable diffuse interstitial prominence. Narrative:  history: Shortness of breath       COMPARISON: 6/15/2020       FINDINGS:       Frontal chest radiograph submitted for review. Left chest pacing device is again seen. Stable heart size. Questionable diffuse   interstitial prominence. No significant pleural effusion. No evidence for   pneumothorax. Medical Decision Making   I am the first provider for this patient.     I reviewed the vital signs, available nursing notes, past medical history, past surgical history, family history and social history. Vital Signs-Reviewed the patient's vital signs. Patient Vitals for the past 24 hrs:   Temp Pulse Resp BP SpO2   08/09/20 1206  80 21  95 %   08/09/20 1200  80 24  94 %   08/09/20 1145  80 28  96 %   08/09/20 1144 98.8 °F (37.1 °C) (!) 102 (!) 36 166/88 92 %   08/09/20 1143     100 %   08/09/20 1130  96 (!) 36  94 %   08/09/20 1125     91 %   08/09/20 1124    (!) 166/98          Provider Notes (Medical Decision Making):   EKG paced, rate 87, nonspecific ST T changes, QT prolonged    81year-old presenting to ED with shortness of breath. Hypoxic on arrival though is were inadvertently out of oxygen. Her condition improved markedly with oxygen, now requiring BiPAP and feeling more comfortable. Chest x-ray with fluid overload which corresponds with symptoms. She will require admission for further management. ED Course:     Initial assessment performed. The patients presenting problems have been discussed, and they are in agreement with the care plan formulated and outlined with them. I have encouraged them to ask questions as they arise throughout their visit. Disposition:  admit    PLAN:  1. Current Discharge Medication List        2.    Follow-up Information    None       Return to ED if worse     Diagnosis     Clinical Impression: Hypoxic respiratory failure

## 2020-08-10 ENCOUNTER — APPOINTMENT (OUTPATIENT)
Dept: NON INVASIVE DIAGNOSTICS | Age: 70
DRG: 291 | End: 2020-08-10
Attending: FAMILY MEDICINE
Payer: MEDICARE

## 2020-08-10 LAB
ALBUMIN SERPL-MCNC: 2.9 G/DL (ref 3.5–5)
ALBUMIN/GLOB SERPL: 0.8 {RATIO} (ref 1.1–2.2)
ALP SERPL-CCNC: 151 U/L (ref 45–117)
ALT SERPL-CCNC: 27 U/L (ref 12–78)
ANION GAP SERPL CALC-SCNC: 2 MMOL/L (ref 5–15)
AST SERPL-CCNC: 15 U/L (ref 15–37)
ATRIAL RATE: 81 BPM
BASOPHILS # BLD: 0 K/UL (ref 0–0.1)
BASOPHILS NFR BLD: 0 % (ref 0–1)
BILIRUB SERPL-MCNC: 0.9 MG/DL (ref 0.2–1)
BUN SERPL-MCNC: 18 MG/DL (ref 6–20)
BUN/CREAT SERPL: 14 (ref 12–20)
CALCIUM SERPL-MCNC: 7.6 MG/DL (ref 8.5–10.1)
CALCULATED R AXIS, ECG10: -90 DEGREES
CALCULATED T AXIS, ECG11: 77 DEGREES
CHLORIDE SERPL-SCNC: 108 MMOL/L (ref 97–108)
CHOLEST SERPL-MCNC: 123 MG/DL
CO2 SERPL-SCNC: 30 MMOL/L (ref 21–32)
CREAT SERPL-MCNC: 1.28 MG/DL (ref 0.55–1.02)
DIAGNOSIS, 93000: NORMAL
DIFFERENTIAL METHOD BLD: ABNORMAL
EOSINOPHIL # BLD: 0 K/UL (ref 0–0.4)
EOSINOPHIL NFR BLD: 0 % (ref 0–7)
ERYTHROCYTE [DISTWIDTH] IN BLOOD BY AUTOMATED COUNT: 17.7 % (ref 11.5–14.5)
EST. AVERAGE GLUCOSE BLD GHB EST-MCNC: 194 MG/DL
GLOBULIN SER CALC-MCNC: 3.5 G/DL (ref 2–4)
GLUCOSE BLD STRIP.AUTO-MCNC: 179 MG/DL (ref 65–100)
GLUCOSE BLD STRIP.AUTO-MCNC: 204 MG/DL (ref 65–100)
GLUCOSE BLD STRIP.AUTO-MCNC: 220 MG/DL (ref 65–100)
GLUCOSE BLD STRIP.AUTO-MCNC: 235 MG/DL (ref 65–100)
GLUCOSE SERPL-MCNC: 233 MG/DL (ref 65–100)
HBA1C MFR BLD: 8.4 % (ref 4–5.6)
HCT VFR BLD AUTO: 33.3 % (ref 35–47)
HDLC SERPL-MCNC: 56 MG/DL
HDLC SERPL: 2.2 {RATIO} (ref 0–5)
HGB BLD-MCNC: 10 G/DL (ref 11.5–16)
IMM GRANULOCYTES # BLD AUTO: 0.1 K/UL (ref 0–0.04)
IMM GRANULOCYTES NFR BLD AUTO: 1 % (ref 0–0.5)
LDLC SERPL CALC-MCNC: 53 MG/DL (ref 0–100)
LIPID PROFILE,FLP: NORMAL
LYMPHOCYTES # BLD: 0.5 K/UL (ref 0.8–3.5)
LYMPHOCYTES NFR BLD: 8 % (ref 12–49)
MCH RBC QN AUTO: 26.4 PG (ref 26–34)
MCHC RBC AUTO-ENTMCNC: 30 G/DL (ref 30–36.5)
MCV RBC AUTO: 87.9 FL (ref 80–99)
MONOCYTES # BLD: 0.2 K/UL (ref 0–1)
MONOCYTES NFR BLD: 4 % (ref 5–13)
NEUTS SEG # BLD: 5 K/UL (ref 1.8–8)
NEUTS SEG NFR BLD: 86 % (ref 32–75)
NRBC # BLD: 0 K/UL (ref 0–0.01)
NRBC BLD-RTO: 0 PER 100 WBC
PLATELET # BLD AUTO: 168 K/UL (ref 150–400)
PMV BLD AUTO: 11.4 FL (ref 8.9–12.9)
POTASSIUM SERPL-SCNC: 3.3 MMOL/L (ref 3.5–5.1)
PROT SERPL-MCNC: 6.4 G/DL (ref 6.4–8.2)
Q-T INTERVAL, ECG07: 440 MS
QRS DURATION, ECG06: 146 MS
QTC CALCULATION (BEZET), ECG08: 529 MS
RBC # BLD AUTO: 3.79 M/UL (ref 3.8–5.2)
SERVICE CMNT-IMP: ABNORMAL
SODIUM SERPL-SCNC: 140 MMOL/L (ref 136–145)
TRIGL SERPL-MCNC: 70 MG/DL (ref ?–150)
TROPONIN I SERPL-MCNC: <0.05 NG/ML
TSH SERPL DL<=0.05 MIU/L-ACNC: 0.47 UIU/ML (ref 0.36–3.74)
VENTRICULAR RATE, ECG03: 87 BPM
VLDLC SERPL CALC-MCNC: 14 MG/DL
WBC # BLD AUTO: 5.8 K/UL (ref 3.6–11)

## 2020-08-10 PROCEDURE — 74011636637 HC RX REV CODE- 636/637: Performed by: INTERNAL MEDICINE

## 2020-08-10 PROCEDURE — 65660000001 HC RM ICU INTERMED STEPDOWN

## 2020-08-10 PROCEDURE — 85025 COMPLETE CBC W/AUTO DIFF WBC: CPT

## 2020-08-10 PROCEDURE — 77010033678 HC OXYGEN DAILY

## 2020-08-10 PROCEDURE — 74011250637 HC RX REV CODE- 250/637: Performed by: INTERNAL MEDICINE

## 2020-08-10 PROCEDURE — 74011250637 HC RX REV CODE- 250/637: Performed by: FAMILY MEDICINE

## 2020-08-10 PROCEDURE — 83036 HEMOGLOBIN GLYCOSYLATED A1C: CPT

## 2020-08-10 PROCEDURE — 80053 COMPREHEN METABOLIC PANEL: CPT

## 2020-08-10 PROCEDURE — 84443 ASSAY THYROID STIM HORMONE: CPT

## 2020-08-10 PROCEDURE — 80061 LIPID PANEL: CPT

## 2020-08-10 PROCEDURE — 82962 GLUCOSE BLOOD TEST: CPT

## 2020-08-10 PROCEDURE — 74011000250 HC RX REV CODE- 250: Performed by: FAMILY MEDICINE

## 2020-08-10 PROCEDURE — 36415 COLL VENOUS BLD VENIPUNCTURE: CPT

## 2020-08-10 PROCEDURE — 84484 ASSAY OF TROPONIN QUANT: CPT

## 2020-08-10 RX ORDER — ACETAMINOPHEN 325 MG/1
650 TABLET ORAL
Status: DISCONTINUED | OUTPATIENT
Start: 2020-08-10 | End: 2020-08-12 | Stop reason: HOSPADM

## 2020-08-10 RX ORDER — ONDANSETRON 2 MG/ML
4 INJECTION INTRAMUSCULAR; INTRAVENOUS
Status: DISCONTINUED | OUTPATIENT
Start: 2020-08-10 | End: 2020-08-12 | Stop reason: HOSPADM

## 2020-08-10 RX ORDER — INSULIN GLARGINE 100 [IU]/ML
10 INJECTION, SOLUTION SUBCUTANEOUS
Status: DISCONTINUED | OUTPATIENT
Start: 2020-08-10 | End: 2020-08-12 | Stop reason: HOSPADM

## 2020-08-10 RX ORDER — PREGABALIN 100 MG/1
200 CAPSULE ORAL 2 TIMES DAILY
Status: DISCONTINUED | OUTPATIENT
Start: 2020-08-10 | End: 2020-08-12 | Stop reason: HOSPADM

## 2020-08-10 RX ADMIN — INSULIN LISPRO 2 UNITS: 100 INJECTION, SOLUTION INTRAVENOUS; SUBCUTANEOUS at 17:22

## 2020-08-10 RX ADMIN — BUMETANIDE 1 MG: 0.25 INJECTION INTRAMUSCULAR; INTRAVENOUS at 08:57

## 2020-08-10 RX ADMIN — PREGABALIN 200 MG: 100 CAPSULE ORAL at 17:23

## 2020-08-10 RX ADMIN — VENLAFAXINE HYDROCHLORIDE 150 MG: 150 CAPSULE, EXTENDED RELEASE ORAL at 08:59

## 2020-08-10 RX ADMIN — ASPIRIN 81 MG CHEWABLE TABLET 81 MG: 81 TABLET CHEWABLE at 09:00

## 2020-08-10 RX ADMIN — POTASSIUM BICARBONATE 40 MEQ: 782 TABLET, EFFERVESCENT ORAL at 08:55

## 2020-08-10 RX ADMIN — PREGABALIN 200 MG: 100 CAPSULE ORAL at 09:00

## 2020-08-10 RX ADMIN — INSULIN LISPRO 2 UNITS: 100 INJECTION, SOLUTION INTRAVENOUS; SUBCUTANEOUS at 23:54

## 2020-08-10 RX ADMIN — INSULIN LISPRO 3 UNITS: 100 INJECTION, SOLUTION INTRAVENOUS; SUBCUTANEOUS at 08:57

## 2020-08-10 RX ADMIN — AMLODIPINE BESYLATE 2.5 MG: 5 TABLET ORAL at 08:59

## 2020-08-10 RX ADMIN — DABIGATRAN ETEXILATE MESYLATE 150 MG: 150 CAPSULE ORAL at 17:23

## 2020-08-10 RX ADMIN — PANTOPRAZOLE SODIUM 40 MG: 40 TABLET, DELAYED RELEASE ORAL at 09:01

## 2020-08-10 RX ADMIN — Medication 10 ML: at 23:56

## 2020-08-10 RX ADMIN — METOPROLOL SUCCINATE 100 MG: 50 TABLET, EXTENDED RELEASE ORAL at 09:00

## 2020-08-10 RX ADMIN — ISOSORBIDE DINITRATE 30 MG: 20 TABLET ORAL at 09:00

## 2020-08-10 RX ADMIN — VENLAFAXINE HYDROCHLORIDE 150 MG: 150 CAPSULE, EXTENDED RELEASE ORAL at 17:23

## 2020-08-10 RX ADMIN — INSULIN GLARGINE 10 UNITS: 100 INJECTION, SOLUTION SUBCUTANEOUS at 23:55

## 2020-08-10 RX ADMIN — Medication 10 ML: at 17:24

## 2020-08-10 RX ADMIN — BUMETANIDE 1 MG: 0.25 INJECTION INTRAMUSCULAR; INTRAVENOUS at 21:17

## 2020-08-10 RX ADMIN — HYDRALAZINE HYDROCHLORIDE 25 MG: 25 TABLET, FILM COATED ORAL at 17:23

## 2020-08-10 RX ADMIN — Medication 10 ML: at 06:00

## 2020-08-10 RX ADMIN — ATORVASTATIN CALCIUM 40 MG: 40 TABLET, FILM COATED ORAL at 21:18

## 2020-08-10 RX ADMIN — HYDRALAZINE HYDROCHLORIDE 25 MG: 25 TABLET, FILM COATED ORAL at 08:59

## 2020-08-10 RX ADMIN — INSULIN LISPRO 3 UNITS: 100 INJECTION, SOLUTION INTRAVENOUS; SUBCUTANEOUS at 11:51

## 2020-08-10 RX ADMIN — PREGABALIN 200 MG: 100 CAPSULE ORAL at 00:37

## 2020-08-10 RX ADMIN — DABIGATRAN ETEXILATE MESYLATE 150 MG: 150 CAPSULE ORAL at 09:00

## 2020-08-10 NOTE — PROGRESS NOTES
Problem: Risk for Spread of Infection  Goal: Prevent transmission of infectious organism to others  Description: Prevent the transmission of infectious organisms to other patients, staff members, and visitors. Outcome: Progressing Towards Goal     Problem: Falls - Risk of  Goal: *Absence of Falls  Description: Document Gerardo Rodrigo Fall Risk and appropriate interventions in the flowsheet. Outcome: Progressing Towards Goal  Note: Fall Risk Interventions:  Mobility Interventions: Bed/chair exit alarm, Communicate number of staff needed for ambulation/transfer, Patient to call before getting OOB         Medication Interventions: Bed/chair exit alarm, Evaluate medications/consider consulting pharmacy, Patient to call before getting OOB, Teach patient to arise slowly                   Problem: Pressure Injury - Risk of  Goal: *Prevention of pressure injury  Description: Document Preet Scale and appropriate interventions in the flowsheet.   Outcome: Progressing Towards Goal  Note: Pressure Injury Interventions:       Moisture Interventions: Absorbent underpads, Apply protective barrier, creams and emollients, Internal/External urinary devices    Activity Interventions: Increase time out of bed, Pressure redistribution bed/mattress(bed type), PT/OT evaluation    Mobility Interventions: Float heels, HOB 30 degrees or less, PT/OT evaluation    Nutrition Interventions: Document food/fluid/supplement intake, Offer support with meals,snacks and hydration    Friction and Shear Interventions: Apply protective barrier, creams and emollients, Minimize layers

## 2020-08-10 NOTE — CONSULTS
Cardiology Consult Note    CC: dyspnea; resp failure    PCP:Reginald Walls MD  Reason for consult:  dyspnea  Requesting MD:  Dr. Sigrid Presley. Admit Date: 8/9/2020   Today's Date: 8/10/2020   Cardiologist:  Dr Little/Obie. Cardiac Assessment/Plan:   1) CAD (Prox LAD ISELA 4/2014 for NQWMI), Neg PET for ischemia 9/2018. Min CAD 2/2019.  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 Glorietta Mcburney was too expensive. *EF 15-20% 9/2018. EF 30% w/ minimal CAD at cath 2/2019. EF 20-25% (m-mod MR; mod TR) 8/2019.  3) HTN,   4) AFIB: PAfib (RFA 4/2016 and 1/2017), Recurrent/persistent afib 2017/2018: AV node ablation 10/2018. *Chronic AC w/ pradaxa. 5) DM,   6) SHAYLA (on CPAP),   7) CKD (Cr 1.5 range). Aldactone stopped in 2014. Cr 2 11/2018 (after becky): Cr 1.5 12/2018 & 8/2019; She has reconnected with Dr. Maria A Bentley. 8) [facility] Don PPM 7/2014 for bradycardia while on therapy for AFib: changed to BiV ICD 10/2018.  9) nephrolithiasis  10) cholecystectomy 11/2018.  11) LE venous insufficiency:  B SFV ablation 3/2020. Continues to use compression stockings/wraps. 12) Obesity: 240# 2014; 262# 9/2019. 270 to 281# 2020. Admitted with resp failure/CHF. +salty diet; Compliant w CPAP & meds. Rec: agree w/ diuresis; Needs better BP control. Hospital Problem List:  Active Problems:    Respiratory failure ([address] (8/9/2020)       ____________________________________________________________________  Ginger Lopez is a 81 y.o. female presents with Respiratory failure ([facility] [address] [J96.90]. As noted in H&P: \"75 y.o. female who presents with with SOB.     History was obtained from the patient, although patient is a poor historian and currently on BiPAP     Patient started experiencing some shortness of breath that started 2 to 3 days back. Started experiencing increased lower extremity edema, trouble laying down, nausea and poor appetite.   Today the shortness of breath got worse and patient decided to come to the hospital.  Patient was found to be in acute respiratory distress and was put on BiPAP in the ER and was requested to be admitted under the hospitalist service. Patient denies any fever or chills associated with her symptoms. Patient reports that her legs have been swelling up and she feels as if they are holding fluid. The patient denies any Headache, blurry vision, sore throat, trouble swallowing, trouble with speech, chest pain,cough, fever, chills, N/V/D, abd pain, urinary symptoms, constipation, recent travels, sick contacts, focal or generalized oneirological symptoms,  falls, injuries, rashes, contact with COVID-19 diagnosed patients, hematemesis, melena, hemoptysis, hematuria, rashes, denies starting any new medications and denies any other concerns or problems besides as mentioned above. \"    ______________________________________________________________________    The patient reports no angina; few min resting CP yesterday: gone spont. Worse STEEN PTA, improved now; No change in LE edema. No PND, orthopnea, palpitations, pre-syncope, syncope. No current complaints. ECG: afib; vpaced. Tele: afib; vpaced. CXR: \"Questionable diffuse interstitial prominence. \"  Chest CT w/o contrast: \"1. Small bilateral pleural effusions. 2. Diffuse ill-defined pulmonary groundglass opacification. Findings nonspecific  and should be correlated clinically. 3. Patchy bibasilar atelectasis versus pneumonia. \"    Notable labs: Hg 10; K 3.3 from 3.8; Cr 1.28 from 1.36 (prev 1.5/GFR 35). LDL 53.  _____________________________________________________________________  Notable prior cardiac history:  @ OV 7/17/20:  Ms Georgie Patel has a h/o:  1) CAD (Prox LAD ISELA 4/2014 for NQWMI), Neg PET for ischemia 9/2018. Min CAD 2/2019.  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 [place] was too expensive. *EF 15-20% 9/2018. EF 30% w/ minimal CAD at cath 2/2019.  EF 20-25% (m-mod MR; mod TR) 8/2019.  3) HTN,   4) AFIB: PAfib (RFA 4/2016 and 1/2017), Recurrent/persistent afib 2017/2018: AV node ablation 10/2018. *Chronic AC w/ pradaxa. 5) DM,   6) SHAYLA (on CPAP),   7) CKD (Cr 1.5 range). Aldactone stopped in 2014. Cr 2 11/2018 (after becky): Cr 1.5 12/2018 & 8/2019; She has reconnected with Dr. Genevieve Lund. 8) St Don PPM 7/2014 for bradycardia while on therapy for AFib: changed to BiV ICD 10/2018.  9) nephrolithiasis  10) cholecystectomy 11/2018.  11) LE venous insufficiency:  B SFV ablation 3/2020. Continues to use compression stockings/wraps. 12) Obesity: 240# 2014; 262# 9/2019. 270 to 281# 2020.    11/2019: Chronic STEEN and fatigue are unchanged but bothersome. Now having some upper back pain with it and thinks that is due to AF. States discomfort worsens with exertion and improves with rest. No syncope. BLE edema unchanged. Normally wears wraps/stockings. 1/2020:  Continued LE edema & wears wraps/stockings for this. No chest pain. Decreasing dyspnea with increasing exercise tolerance. 7/2020:  No chest pain nor change in dyspnea. Using CPAP. Had palpitations x1 since last visit. No falling or bleeding. IMPRESSION AND PLAN  01. Atherosclerotic heart disease of native coronary artery without angina pectoris (I25.10): Minimal CAD @ cath 2/2019. We discussed the signs and symptoms of unstable angina, myocardial infarction and malignant arrhythmia. The patient knows to seek immediate medical attention should they occur. 02. Dilated cardiomyopathy (I42.0):  Mixed ischemic/non-ischemic (tachycardia mediated) CM previously. Her EF improved but then has worsened again as noted above. EF 30% 2/2019. The patient has an ICD in place. 03. Chronic combined systolic (congestive) and diastolic (congestive) heart failure (I50.42): The patient is compliant with their CHF regimen. is tolerating the current beta-blocker dose. 04.  Longstanding persistent atrial fibrillation (I48.11): As per Dr. Lorena Hu, off amiodarone & now s/p AV node ablation. She remains on anticoagulation. ECG done   05. Hyp hrt & chr kdny dis w hrt fail and stg 1-4/unsp chr kdny (I13.0):  Finally controlled with addition of Imdur. I have made no changes to the present regimen. 06. Chronic venous insufficiency (I87.2): As noted by Dr Chester Majano: Tremayne Hummel has bilateral lower extremity edema also has signs of chronic venous stasis with discoloration of anterior aspect of legs. She also has congestive heart failure and chronic kidney disease. Her etiology for leg swelling is multifactorial.  She has undergone bilateral GSV RF ablation. She has not seen much improvement yet. I have advised her to continue to wear the compression stocking and do the leg elevations for next 6 months. She cannot exercise due to  shortness of breath. I will see her back in 6 months. \"   07. Mixed hyperlipidemia (E78.2):  Lipid labs drawn by PCP. The patient is tolerating lipid lowering therapy well. 08. Chronic kidney disease, stage 3 (moderate) (N18.3):  Cr 1.24/GFR46 11/2015. Cr 1.32/GFR 43 1/2017. Cr 2 11/2018 after becky. Creatinine 1.5 12/2018. 09. Localized edema (R60.0):  Chronic. She avoids salt. This condition is stable. 11. Presence of automatic (implantable) cardiac defibrillator (Z95.810): This is a biventricular device. will continue to be followed in device clinic. 11. Type 2 diabetes mellitus with other specified complication (X72.96):  Lipids & HTN as noted above; DM managed by other MD.   12. Long term (current) use of aspirin (Z79.82): This condition is stable. 15. Long term (current) use of anticoagulants (Z79.01): This condition is stable. Indicated for atrial fibrillation. No bleeding. If she has recurrent falls, she knows to call for re-evaluation of anticoagulation. 14. Body mass index (BMI) 40.0-44.9, adult (Z68.41): Worsened since last seen.   The patient was instructed on AHA diet and regular exercise. ORDERS:  1 Dietary management education, guidance, and counseling    2 ECG done    3 Return office visit with María Little MD in 1 Year. 4 The patient was instructed on AHA diet and regular exercise.         7/17/20 MEDICATION LIST  Medication Sig Desc Non-VCS   albuterol sulfate HFA 90 mcg/actuation aerosol inhaler inhale 2 puff by inhalation route  every 4 - 6 hours as needed N   amlodipine 10 mg tablet take 1 tablet by oral route  every day N   aspirin 81 mg tablet,delayed release take 1 tablet by oral route  every day N   atorvastatin 40 mg tablet take 1 tablet by oral route  every evening N   Ayr Saline 0.65 % nasal spray aerosol  Y   bumetanide 2 mg tablet take 1 tablet by oral route  every day N   clonazepam 0.5 mg tablet take 1 tablet by oral route  every day Y   glimepiride 4 mg tablet take 1 tablet by oral twice a day N   hydralazine 25 mg tablet take 1 tablet by oral route 2 times every day with food N   isosorbide mononitrate ER 30 mg tablet,extended release 24 hr take 1 tablet by oral route  twice a day N   lidocaine 4 % topical cream  Y   lisinopril 40 mg tablet take 1 tablet by oral route  every day N   Lyrica 200 mg capsule take 1 capsule by oral route 2 times every day Y   metoprolol succinate  mg tablet,extended release 24 hr TAKE 1 TABLET EVERY DAY N   omeprazole 40 mg capsule,delayed release take 1 capsule by oral route  every day before a meal Y   potassium chloride ER 10 mEq capsule,extended release take 1 Capsule by oral route 3 times daily N   Pradaxa 150 mg capsule take 1 capsule by oral route 2 times every day N   Premarin 0.625 mg/gram vaginal cream insert 0.5 applicatorful by vaginal route  every day cyclically, 3 weeks on and 1 week off Y   Symbicort 160 mcg-4.5 mcg/actuation HFA aerosol inhaler inhale 2 puff by inhalation route 2 times every day in the morning and evening N   venlafaxine  mg capsule,extended release 24 hr take 1 capsule by oral route 2 times every day N   Vitamin D3 1,000 unit capsule take 1 daily N       _________________________________________________________________________________________________  CARDIAC HISTORY  CAD:  1 NSTEMI [95% Proximal LAD, Resolute (ISELA) to the Proximal LAD.] - 4/8/2014     CHF/CM:  1 Mixed ischemic/tachycardic CM. [Nl TSH.] - 4/2014   2 Ischemic & tachycardic Cardiomyopathy [Echo (EF 0.45) - 06/17/2014, (prev EF 20-30%)] - 6/2014   3 Progressive Cardiomyopathy [Cardiac PET (EF .16) - 09/24/2018, No ischemia noted. EF improved but not normalized; no CAD cath 2/2019.] - 9/2018     ARRHYTHMIA:  1 A Fib [DCCV, Plan: amio started; Eliquis with Effient (change eliquis to asa if NSR after 30 days). ] - 4/8/2014   2 PAF - 8/2010   3 A Fib, recurrent; and 6/2014. [Had marked sinus bradycardia while on bblocker/dig.] - 5/2014   4 Sinus Bradycardia. - 6/2/2014   5 PPM (Devices (Dual Chamber PPM (Jannine Fears)) - 7/17/2014) - 7/17/2014   6 PAF [CVR; Eliquis restarted (plavix stopped). ] - 9/2015   7 A Fib [RFA] - 4/2016   8 A Fib [RFA] - 1/2017   9 Chronic A Fib [PPM to BiV device, then AV node ablation. Amiodarone stopped. ] - 10/2018     RISK FACTORS:  1 Diabetes [Diagnosed in 2014]   2 Hypertension   3 Dyslipidemia   4 Tobacco Use: No/never       CARDIOVASCULAR PROCEDURES  Procedure Date Results   Cardiac PET 09/24/2018 EF 0.16 (16%), physiologic apical thinning with no ischemia   Cath 02/22/2019 Minimal CAD   Cath 04/08/2014 95% Proximal LAD, Resolute (ISELA) to the Proximal LAD. DCCV 04/08/2014 Initial Rhythm A Fib, Final Rhythm Sinus   Devices 02/08/2019 Bi-Ventricular ICD (St. Don), 100% Afib. BIV pacing >99% (prior AV node ablation)   Devices 07/17/2014 Dual Chamber PPM (St. [id number])   Echo 08/17/2019 EF 20-25%; mild-to-moderate MR. Moderate TR. Low normal RV function. At [address]    Echo 02/06/2019 EF 0.30 (30%), Mild MR, Mild TR, Mild LVH, Aortic Sclerosis, LA Diam 3.9 cm, Est RVSP 40 mmHg, global hypokinesis, worse in the inferior wall & apex. Normal RV. Echo 10/08/2018 EF 0.15 (15%), Aortic Sclerosis, Mild MR, Est RVSP 23 mmHg, EF 15-20%; normal RV. At [facility]. Echo 11/28/2017 EF 0.45 (45%), Mild Global Hypo, Mild TR, Mild LVH, Mild MR, Est RVSP 41 mmHg, Normal RV. (probable trileaflet AoV). Echo 10/30/2015 EF 0.45 (45%), Mild LVH, LA Diam 4.1 cm, Est RVSP 35 mmHg, Normal RV. ?bicuspid AoV; (Prob trileaflet on previous ANGELITO). Echo 06/17/2014 EF 0.45 (45%), EF range 45%-50%, Mild LV dilatation. Mild LV systolic dysfunction. ? Bicuspid AoV but prob trileafet on previous ANGELITO. Echo 04/03/2014 EF 0.20 (20%), global HK with lateral sparing; no valve disease. EKG 07/17/2020 Atrial Fib, Paced Rhythm   Holter 06/09/2014 No Malignant Arrhythmias, Atrial Fib, No correlation with symptoms, (, [address]   Holter 04/30/2014 No Malignant Arrhythmias, Atrial Fib, No correlation with symptoms, \"light or heavy chest\" = afib in 60s. HR  (ave 63). ASx pauses (upto 2.8) while asleep. RFA 01/19/2017 Indication A Fib, Final Rhythm Sinus   RFA  Indication A Fib   Sleep Study  OSAS: Moderate   ANGELITO 04/08/2014 EF 0.35 (35%), No BRAYDON Clot, prob trileaflet AoV. Venous Duplex 04/16/2020 Left:  1. Post venous procedure duplex exam shows no evidence of thrombus in the saphenofemoral junction. 2. Left mid calf positive  5mm in diameter and 2.8s in reflux. Right:  Right distal calf positive  4.1mm in diameter and .53s in reflux. Venous Duplex 03/27/2020 Post venous ablation duplex exam shows no evidence of heat induced thrombus in the right common femoral vein. Venous Duplex 02/19/2020 Exam was conducted with patient in a high reverse-trendelenburg position. The (right/left/bilateral) great saphenous vein is incompetent (above/below the knee) (throughout the lower extremity) with retrograde flow >0.5seconds.        ACTIVE ALLERGIES:  Ingredient Reaction Comment   SACUBITRAMY Baeza is too expensive SULFA (SULFONAMIDE ANTIBIOTICS)     POTASSIUM CLAVULANATE     AMOXICILLIN TRIHYDRATE       ACTIVE INTOLERANCES:  Description Reaction Comment   SACUBITRIL [place] is too expensive      PROBLEM LIST:  Problem Description Chronic   Mixed hyperlipidemia Y   Benign essential HTN Y   DM type 2 N   A-fib Y   CAD Y       PAST MEDICAL/SURGICAL HISTORY  (Detailed)    Disease/disorder Onset Date Surgical History Date Comments     hysterectomy     A-FIB       CHF       Hyperlipidemia       Hypertension       SHAYLA: moderate. 2014        OBSTETRIC HISTORY:  Not currently pregnant. Family History  (Detailed)  Relationship Family Member Name  Age at Death Condition Onset Age Cause of Death   Brother    Hypertension  N   Mother    PAD  N   Mother    Hypertension  N   Mother    Cardiac arrhythmias  N   Sister    Diabetes mellitus  N     SOCIAL HISTORY  (Detailed)  Tobacco use reviewed. Preferred language is [place]. EDUCATION/EMPLOYMENT/OCCUPATION  Employment History Status Retired Restrictions     retired       retired       retired       retired       retired       retired       retired       retired       retired       retired       MARITAL STATUS/FAMILY/SOCIAL SUPPORT  Currently . Smoking status: Never smoker. ALCOHOL  There is no history of alcohol use. CAFFEINE  The patient does not use caffeine. .  ______________________________________________________________________  Patient Active Problem List    Diagnosis Date Noted    Lower extremity edema 2019     Priority: 1 - One    Venous stasis dermatitis of both lower extremities 2019     Priority: 1 - One    Ingrown toenail of left foot 2019     Priority: 1 - One    Obesity (BMI 30-39.9) 2019     Priority: 1 - One    Chronic cholecystitis 2018     Priority: 1 - One    Ischemic cardiomyopathy 2018     Priority: 1 - One    CAD (coronary artery disease) 2018     Priority: 1 - One    Stage 3 chronic kidney disease ([facility] 11/13/2018     Priority: 1 - One    Anxiety 01/22/2018     Priority: 1 - One    Hypercholesterolemia 01/22/2018     Priority: 1 - One    Long-term use of high-risk medication 01/22/2018     Priority: 1 - One    Cellulitis of left lower leg 06/06/2016     Priority: 1 - One    Respiratory failure ([address] 08/09/2020    Type 2 diabetes with nephropathy ([facility] 04/30/2020    Acute asthma exacerbation 08/17/2019    Asthma 01/31/2019    Epigastric abdominal pain 11/13/2018    S/P placement of cardiac pacemaker 11/13/2018    Hypokalemia 10/07/2018    Lower abdominal pain 06/05/2016    Type 2 diabetes mellitus with diabetic neuropathy, without long-term current use of insulin ([facility] 06/05/2016    Chronic atrial fibrillation ([facility] 19/60/8977    Systolic CHF, chronic ([facility] 06/05/2016    Fever 06/04/2016    UTI (urinary tract infection) 06/04/2016    Foot pain, right 11/20/2013    Unspecified hereditary and idiopathic peripheral neuropathy 02/22/2013    HBP (high blood pressure) 02/22/2013    Spinal stenosis, lumbar region, without neurogenic claudication 02/22/2013    Essential and other specified forms of tremor 02/22/2013    IBS (irritable bowel syndrome) 02/22/2013    Depression 02/22/2013    Migraine with aura, without mention of intractable migraine without mention of status migrainosus 02/22/2013    Right knee DJD 02/22/2013    B12 deficiency 02/22/2013    Ataxia 02/22/2013        Past Medical History:   Diagnosis Date    Anxiety 1/22/2018    Arthritis     OA    Asthma     Diabetes ([facility]     Essential hypertension     GERD (gastroesophageal reflux disease)     Hypercholesterolemia 1/22/2018    Hypertension     Ill-defined condition     diverticulitis    Long-term use of high-risk medication 1/22/2018    Neuropathy     Obesity (BMI 30-39.9) 4/30/2019    Other ill-defined conditions(799.89)     IBS, spinal stenosis    Plantar fasciitis     Psychiatric disorder depression, anxiety    Sleep apnea     uses CPAP    Type 2 diabetes mellitus with diabetic neuropathy, without long-term current use of insulin ([facility] 6/5/2016      Past Surgical History:   Procedure Laterality Date    CARDIAC SURG PROCEDURE UNLIST      stent    COLONOSCOPY N/A 3/14/2018    COLONOSCOPY performed by Lucas Del Cid MD at [facility] ENDOSCOPY    HX APPENDECTOMY      HX CHOLECYSTECTOMY  11/15/2018    HX HYSTERECTOMY      HX PACEMAKER       Allergies   Allergen Reactions    Sulfa (Sulfonamide Antibiotics) Swelling    Amoxicillin Swelling      Family History   Problem Relation Age of Onset    Arthritis-osteo Mother     Hypertension Mother     High Cholesterol Mother     Crohn's Disease Mother     Heart Disease Mother     Alcohol abuse Father     High Cholesterol Sister     Hypertension Sister     Thyroid Disease Sister     COPD Sister     High Cholesterol Brother     Hypertension Brother     COPD Brother     COPD Child     Inflammatory Bowel Dz Child       Social History     Socioeconomic History    Marital status:      Spouse name: Not on file    Number of children: Not on file    Years of education: Not on file    Highest education level: Not on file   Occupational History    Not on file   Social Needs    Financial resource strain: Not on file    Food insecurity     Worry: Not on file     Inability: Not on file    Transportation needs     Medical: Not on file     Non-medical: Not on file   Tobacco Use    Smoking status: Never Smoker    Smokeless tobacco: Never Used   Substance and Sexual Activity    Alcohol use: No    Drug use: No    Sexual activity: Not on file   Lifestyle    Physical activity     Days per week: Not on file     Minutes per session: Not on file    Stress: Not on file   Relationships    Social connections     Talks on phone: Not on file     Gets together: Not on file     Attends Mandaeism service: Not on file     Active member of club or organization: Not on file     Attends meetings of clubs or organizations: Not on file     Relationship status: Not on file    Intimate partner violence     Fear of current or ex partner: Not on file     Emotionally abused: Not on file     Physically abused: Not on file     Forced sexual activity: Not on file   Other Topics Concern    Not on file   Social History Narrative    Not on file     Current Facility-Administered Medications   Medication Dose Route Frequency    pregabalin (LYRICA) capsule 200 mg  200 mg Oral BID    acetaminophen (TYLENOL) tablet 650 mg  650 mg Oral Q6H PRN    ondansetron (ZOFRAN) injection 4 mg  4 mg IntraVENous Q4H PRN    nitroglycerin (NITROBID) 2 % ointment 1 Inch  1 Inch Topical Q6H PRN    amLODIPine (NORVASC) tablet 2.5 mg  2.5 mg Oral DAILY    atorvastatin (LIPITOR) tablet 40 mg  40 mg Oral QHS    dabigatran etexilate (PRADAXA) capsule 150 mg  150 mg Oral BID    hydrALAZINE (APRESOLINE) tablet 25 mg  25 mg Oral BID    isosorbide dinitrate (ISORDIL) tablet 30 mg  30 mg Oral DAILY    metoprolol succinate (TOPROL-XL) XL tablet 100 mg  100 mg Oral DAILY    pantoprazole (PROTONIX) tablet 40 mg  40 mg Oral ACB    venlafaxine-SR (EFFEXOR-XR) capsule 150 mg  150 mg Oral BID WITH MEALS    sodium chloride (NS) flush 5-40 mL  5-40 mL IntraVENous Q8H    sodium chloride (NS) flush 5-40 mL  5-40 mL IntraVENous PRN    bumetanide (BUMEX) injection 1 mg  1 mg IntraVENous Q12H    aspirin chewable tablet 81 mg  81 mg Oral DAILY    insulin lispro (HUMALOG) injection   SubCUTAneous AC&HS    glucose chewable tablet 16 g  4 Tab Oral PRN    dextrose (D50W) injection syrg 12.5-25 g  12.5-25 g IntraVENous PRN    dextrose (D50W) injection syrg 12.5-25 g  25-50 mL IntraVENous PRN    glucagon (GLUCAGEN) injection 1 mg  1 mg IntraMUSCular PRN        Prior to Admission Medications:  Prior to Admission medications    Medication Sig Start Date End Date Taking?  Authorizing Provider   magnesium oxide (MAG-OX) 400 mg tablet Take 1 tablet by mouth twice daily 7/24/20  Yes Lloyd Pham MD   cyclobenzaprine (FLEXERIL) 10 mg tablet Take 1 Tab by mouth three (3) times daily as needed for Muscle Spasm(s). 7/1/20  Yes Lloyd Pham MD   glimepiride (AMARYL) 2 mg tablet Take 1 tablet by mouth twice daily 7/1/20  Yes Lloyd Pham MD   bumetanide (BUMEX) 2 mg tablet TAKE 1 TABLET EVERY DAY 5/31/20  Yes Lloyd Pham MD   omeprazole (PRILOSEC) 40 mg capsule TAKE 1 CAPSULE EVERY DAY 5/31/20  Yes Lloyd Pham MD   clonazePAM (KlonoPIN) 0.5 mg tablet TAKE 1 TABLET BY MOUTH ONCE DAILY AT NIGHT MAX  DAILY  DOSE  OF  0.5MG 5/22/20  Yes Lloyd Pham MD   potassium chloride SR (KLOR-CON 10) 10 mEq tablet Take 1 Tab by mouth three (3) times daily. 5/11/20  Yes Lloyd Pham MD   isosorbide dinitrate (ISORDIL) 30 mg tablet Take 30 mg by mouth daily. Yes Provider, Historical   hydrALAZINE (APRESOLINE) 25 mg tablet Take 25 mg by mouth two (2) times a day. Yes Provider, Historical   calcium carb/vit D2/minerals (CALTRATE PLUS PO) Take 500 mg by mouth daily. Yes Provider, Historical   SYMBICORT 160-4.5 mcg/actuation HFAA INHALE 2 PUFFS BY MOUTH TWICE DAILY 10/1/19  Yes Lloyd Pham MD   albuterol (PROVENTIL HFA, VENTOLIN HFA, PROAIR HFA) 90 mcg/actuation inhaler Take 1 Puff by inhalation every four (4) hours as needed for Wheezing. 8/17/19  Yes Daljit Jean MD   lisinopril (PRINIVIL, ZESTRIL) 40 mg tablet Take 40 mg by mouth daily. Yes Provider, Historical   diphenhydrAMINE (BENADRYL) 25 mg capsule Take 25 mg by mouth two (2) times a day. Yes Provider, Historical   nystatin (MYCOSTATIN) topical cream Apply  to affected area two (2) times daily as needed for Skin Irritation (Rash). Yes Provider, Historical   dabigatran etexilate (PRADAXA) 150 mg capsule Take 1 Cap by mouth two (2) times a day.  IF NO BLEEDING AT CATH SITE. 2/24/19  Yes Anabel Mallory Fung MD   amLODIPine (NORVASC) 2.5 mg tablet Take 1 Tab by mouth daily. Patient taking differently: Take 10 mg by mouth daily. 11/20/18  Yes Ro Diane MD   metoprolol succinate (TOPROL-XL) 100 mg tablet Take 1 Tab by mouth daily. 11/19/18  Yes Kendall Noriega MD   acetaminophen (TYLENOL EXTRA STRENGTH) 500 mg tablet Take 1,000 mg by mouth every eight (8) hours as needed for Pain (Headache). Yes Provider, Historical   lidocaine (ASPERCREME, LIDOCAINE,) 4 % topical cream Apply  to affected area daily as needed for Pain (Knee pain). Yes Provider, Historical   sodium chloride (AYR SALINE) 0.65 % nasal squeeze bottle 2 Sprays by Both Nostrils route every eight (8) hours as needed. Yes Provider, Historical   conjugated estrogens (PREMARIN) 0.625 mg/gram vaginal cream Insert 0.5 g into vagina two (2) times a week. Tuesdays and Thursdays    Yes Provider, Historical   venlafaxine-SR (EFFEXOR XR) 150 mg capsule Take 150 mg by mouth two (2) times daily (with meals). Yes Provider, Historical   pregabalin (LYRICA) 200 mg capsule Take 300 mg by mouth two (2) times a day. Yes Other, MD Ernie   cholecalciferol, vitamin d3, (VITAMIN D3) 400 unit cap Take 400 Units by mouth daily. Yes Other, MD Ernie   aspirin 81 mg chewable tablet Take 81 mg by mouth daily. Yes Provider, Historical   atorvastatin (LIPITOR) 40 mg tablet Take 1 Tab by mouth nightly. 4/9/14  Yes Kylee Rodrigez MD   triamcinolone acetonide (KENALOG) 0.1 % topical cream Apply  to affected area two (2) times a day. use thin layer 7/1/20   Conrad Walls MD   mupirocin calcium (BACTROBAN) 2 % topical cream Apply  to affected area two (2) times a day.  6/29/20   Conrad Walls MD   clotrimazole-betamethasone (LOTRISONE) topical cream APPLY TO THE AFFECTED AREA 2 TIMES DAILY 6/22/20   Conrad Walls MD   traMADol (ULTRAM) 50 mg tablet Take 50 mg by mouth daily as needed for Pain (Used to supplement the Lyrica when lyrica doesnt manage the pain on its own). Provider, Historical        Review of Symptoms: As noted in H&P:  \"A comprehensive review of systems was negative except for that written in the History of Present Illness. \".     24 hr VS reviewed, overall VSSAF  Temp (24hrs), Av.2 °F (36.8 °C), Min:97.3 °F (36.3 °C), Max:98.9 °F (37.2 °C)    Patient Vitals for the past 8 hrs:   Pulse   08/10/20 1106 80   08/10/20 0732 82   08/10/20 0347 84     Patient Vitals for the past 8 hrs:   Resp   08/10/20 1106 18   08/10/20 0732 18   08/10/20 0347 18     Patient Vitals for the past 8 hrs:   BP   08/10/20 1106 142/73   08/10/20 0732 (!) 175/93   08/10/20 0347 139/80       Intake/Output Summary (Last 24 hours) at 8/10/2020 1126  Last data filed at 8/10/2020 0942  Gross per 24 hour   Intake    Output 2600 ml   Net -2600 ml         Physical Exam (complete single organ system exam)    Cons: The patient is no distress. Appears stated age. Obese. On NC.  HEENT: Normal conjunctivae and palate. No xanthelasma. Neck: Flat JVP without appreciable HJR. Resp: Normal respiratory effort with clear lungs bilaterally except min dec at bases. .   CV: Regular rate and rhythm. PMI not palpated. Normal S1,S2  No gallop or rubs appreciated. Soft holosystolic murmur appreciated. Intact carotid upstroke bilaterally without appreciated bruits. Abdominal aorta not palpated; no abdominal bruit noted. Decreased pedal pulses. Chronic mild peripheral edema. GI: No abd mass noted, soft; no organomegaly noted. Bowel sounds present. Muscular:  No significant kyphosis. Strength WNL for age. Ext: No cyanosis, clubbing, or stigmata of peripheral embolization. Derm: No ulcers noted on LE; mild stasis dermatitis of lower extremities. Neuro: Alert and oriented x 3;  Grossly non-focal. Normal mood and affect.      Labs:   Recent Results (from the past 24 hour(s))   POC EG7    Collection Time: 20 11:34 AM   Result Value Ref Range    Calcium, ionized (POC) 1.21 1.12 - 1.32 mmol/L    pH (POC) 7.36 7.35 - 7.45      pCO2 (POC) 45.5 (H) 35.0 - 45.0 MMHG    pO2 (POC) 71 (L) 80 - 100 MMHG    HCO3 (POC) 25.5 22 - 26 MMOL/L    Base excess (POC) 0 mmol/L    sO2 (POC) 93 92 - 97 %    Site RIGHT RADIAL      Device: Non rebreather      Flow rate (POC) 15 L/M    Allens test (POC) YES      Specimen type (POC) ARTERIAL     EKG, 12 LEAD, INITIAL    Collection Time: 08/09/20 11:34 AM   Result Value Ref Range    Ventricular Rate 87 BPM    Atrial Rate 81 BPM    QRS Duration 146 ms    Q-T Interval 440 ms    QTC Calculation (Bezet) 529 ms    Calculated R Axis -90 degrees    Calculated T Axis 77 degrees    Diagnosis       Ventricular-paced rhythm with occasional premature ventricular complexes  When compared with ECG of 17-AUG-2019 07:49,  premature ventricular complexes are now present  Vent. rate has increased BY   7 BPM  Confirmed by Margarito Fontenot ([id number]) on 8/10/2020 8:40:42 AM     CBC WITH AUTOMATED DIFF    Collection Time: 08/09/20 11:40 AM   Result Value Ref Range    WBC 8.3 3.6 - 11.0 K/uL    RBC 4.30 3.80 - 5.20 M/uL    HGB 11.3 (L) 11.5 - 16.0 g/dL    HCT 39.0 35.0 - 47.0 %    MCV 90.7 80.0 - 99.0 FL    MCH 26.3 26.0 - 34.0 PG    MCHC 29.0 (L) 30.0 - 36.5 g/dL    RDW 18.8 (H) 11.5 - 14.5 %    PLATELET 052 579 - 294 K/uL    MPV 11.3 8.9 - 12.9 FL    NRBC 0.4 (H) 0  WBC    ABSOLUTE NRBC 0.03 (H) 0.00 - 0.01 K/uL    NEUTROPHILS 78 (H) 32 - 75 %    LYMPHOCYTES 11 (L) 12 - 49 %    MONOCYTES 5 5 - 13 %    EOSINOPHILS 4 0 - 7 %    BASOPHILS 1 0 - 1 %    IMMATURE GRANULOCYTES 1 (H) 0.0 - 0.5 %    ABS. NEUTROPHILS 6.6 1.8 - 8.0 K/UL    ABS. LYMPHOCYTES 0.9 0.8 - 3.5 K/UL    ABS. MONOCYTES 0.5 0.0 - 1.0 K/UL    ABS. EOSINOPHILS 0.3 0.0 - 0.4 K/UL    ABS. BASOPHILS 0.0 0.0 - 0.1 K/UL    ABS. IMM.  GRANS. 0.1 (H) 0.00 - 0.04 K/UL    DF AUTOMATED     METABOLIC PANEL, COMPREHENSIVE    Collection Time: 08/09/20 11:40 AM   Result Value Ref Range    Sodium 143 136 - 145 mmol/L    Potassium 3.8 3.5 - 5.1 mmol/L    Chloride 109 (H) 97 - 108 mmol/L    CO2 31 21 - 32 mmol/L    Anion gap 3 (L) 5 - 15 mmol/L    Glucose 178 (H) 65 - 100 mg/dL    BUN 16 6 - 20 MG/DL    Creatinine 1.36 (H) 0.55 - 1.02 MG/DL    BUN/Creatinine ratio 12 12 - 20      GFR est AA 47 (L) >60 ml/min/1.73m2    GFR est non-AA 39 (L) >60 ml/min/1.73m2    Calcium 8.4 (L) 8.5 - 10.1 MG/DL    Bilirubin, total 0.8 0.2 - 1.0 MG/DL    ALT (SGPT) 34 12 - 78 U/L    AST (SGOT) 21 15 - 37 U/L    Alk. phosphatase 176 (H) 45 - 117 U/L    Protein, total 7.0 6.4 - 8.2 g/dL    Albumin 3.3 (L) 3.5 - 5.0 g/dL    Globulin 3.7 2.0 - 4.0 g/dL    A-G Ratio 0.9 (L) 1.1 - 2.2     NT-PRO BNP    Collection Time: 08/09/20 11:40 AM   Result Value Ref Range    NT pro-BNP 2,282 (H) <125 PG/ML   TROPONIN I    Collection Time: 08/09/20 11:40 AM   Result Value Ref Range    Troponin-I, Qt. <0.05 <0.05 ng/mL   SAMPLES BEING HELD    Collection Time: 08/09/20 11:40 AM   Result Value Ref Range    SAMPLES BEING HELD RED,DHARMESH,GOLD     COMMENT        Add-on orders for these samples will be processed based on acceptable specimen integrity and analyte stability, which may vary by analyte.    PTT    Collection Time: 08/09/20 11:40 AM   Result Value Ref Range    aPTT 32.2 (H) 22.1 - 32.0 sec    aPTT, therapeutic range     58.0 - 77.0 SECS   SARS-COV-2    Collection Time: 08/09/20 11:42 AM   Result Value Ref Range    Specimen source Nasopharyngeal      Specimen source Nasopharyngeal      COVID-19 rapid test Not detected NOTD      Specimen type NP Swab      Health status Symptomatic Testing     POC LACTIC ACID    Collection Time: 08/09/20 12:09 PM   Result Value Ref Range    Lactic Acid (POC) 1.83 0.40 - 2.00 mmol/L   GLUCOSE, POC    Collection Time: 08/09/20  8:34 PM   Result Value Ref Range    Glucose (POC) 364 (H) 65 - 100 mg/dL    Performed by Ignacio Valadez (PCT)    TSH 3RD GENERATION    Collection Time: 08/10/20  3:55 AM   Result Value Ref Range    TSH 0.47 0.36 - 3.74 uIU/mL   HEMOGLOBIN A1C WITH EAG    Collection Time: 08/10/20  3:55 AM   Result Value Ref Range    Hemoglobin A1c 8.4 (H) 4.0 - 5.6 %    Est. average glucose 194 mg/dL   CBC WITH AUTOMATED DIFF    Collection Time: 08/10/20  3:55 AM   Result Value Ref Range    WBC 5.8 3.6 - 11.0 K/uL    RBC 3.79 (L) 3.80 - 5.20 M/uL    HGB 10.0 (L) 11.5 - 16.0 g/dL    HCT 33.3 (L) 35.0 - 47.0 %    MCV 87.9 80.0 - 99.0 FL    MCH 26.4 26.0 - 34.0 PG    MCHC 30.0 30.0 - 36.5 g/dL    RDW 17.7 (H) 11.5 - 14.5 %    PLATELET 097 001 - 486 K/uL    MPV 11.4 8.9 - 12.9 FL    NRBC 0.0 0  WBC    ABSOLUTE NRBC 0.00 0.00 - 0.01 K/uL    NEUTROPHILS 86 (H) 32 - 75 %    LYMPHOCYTES 8 (L) 12 - 49 %    MONOCYTES 4 (L) 5 - 13 %    EOSINOPHILS 0 0 - 7 %    BASOPHILS 0 0 - 1 %    IMMATURE GRANULOCYTES 1 (H) 0.0 - 0.5 %    ABS. NEUTROPHILS 5.0 1.8 - 8.0 K/UL    ABS. LYMPHOCYTES 0.5 (L) 0.8 - 3.5 K/UL    ABS. MONOCYTES 0.2 0.0 - 1.0 K/UL    ABS. EOSINOPHILS 0.0 0.0 - 0.4 K/UL    ABS. BASOPHILS 0.0 0.0 - 0.1 K/UL    ABS. IMM. GRANS. 0.1 (H) 0.00 - 0.04 K/UL    DF AUTOMATED     METABOLIC PANEL, COMPREHENSIVE    Collection Time: 08/10/20  3:55 AM   Result Value Ref Range    Sodium 140 136 - 145 mmol/L    Potassium 3.3 (L) 3.5 - 5.1 mmol/L    Chloride 108 97 - 108 mmol/L    CO2 30 21 - 32 mmol/L    Anion gap 2 (L) 5 - 15 mmol/L    Glucose 233 (H) 65 - 100 mg/dL    BUN 18 6 - 20 MG/DL    Creatinine 1.28 (H) 0.55 - 1.02 MG/DL    BUN/Creatinine ratio 14 12 - 20      GFR est AA 50 (L) >60 ml/min/1.73m2    GFR est non-AA 42 (L) >60 ml/min/1.73m2    Calcium 7.6 (L) 8.5 - 10.1 MG/DL    Bilirubin, total 0.9 0.2 - 1.0 MG/DL    ALT (SGPT) 27 12 - 78 U/L    AST (SGOT) 15 15 - 37 U/L    Alk.  phosphatase 151 (H) 45 - 117 U/L    Protein, total 6.4 6.4 - 8.2 g/dL    Albumin 2.9 (L) 3.5 - 5.0 g/dL    Globulin 3.5 2.0 - 4.0 g/dL    A-G Ratio 0.8 (L) 1.1 - 2.2     LIPID PANEL    Collection Time: 08/10/20  3:55 AM   Result Value Ref Range    LIPID PROFILE Cholesterol, total 123 <200 MG/DL    Triglyceride 70 <150 MG/DL    HDL Cholesterol 56 MG/DL    LDL, calculated 53 0 - 100 MG/DL    VLDL, calculated 14 MG/DL    CHOL/HDL Ratio 2.2 0.0 - 5.0     TROPONIN I    Collection Time: 08/10/20  3:55 AM   Result Value Ref Range    Troponin-I, Qt. <0.05 <0.05 ng/mL   GLUCOSE, POC    Collection Time: 08/10/20  8:01 AM   Result Value Ref Range    Glucose (POC) 204 (H) 65 - 100 mg/dL    Performed by Jenny Jones    GLUCOSE, POC    Collection Time: 08/10/20 11:12 AM   Result Value Ref Range    Glucose (POC) 235 (H) 65 - 100 mg/dL    Performed by Jenny Jones      Recent Labs     08/10/20  0355 08/09/20  1140   TROIQ <0.05 <0.05       Recent Labs     08/10/20  0355 08/09/20  1140    143   K 3.3* 3.8    109*   CO2 30 31   BUN 18 16   CREA 1.28* 1.36*   GFRAA 50* 47*   * 178*   CA 7.6* 8.4*   ALB 2.9* 3.3*   WBC 5.8 8.3   HGB 10.0* 11.3*   HCT 33.3* 39.0    193       Ricardo Lockhart MD

## 2020-08-10 NOTE — PROGRESS NOTES
Hospitalist Progress Note    NAME: Arie Zafar   :  1950   MRN:  [id number]     Assessment/Plan       Acute Hypoxic Respiratory Failure POA   Due to Acute on chronic systolic heart failure with EF 21-25%  CKD3  Continue Bumex 1mg IV BID  Monitor I/Os, daily weight and lytes  Cardiology consult  2D echo this admission pending  Was on BiPAP in ED then transition to O2, try to wean off O2 as able to     Diabetes mellitus type 2  Poorly controlled  HbA1C 8.4  Holding Amaryl for now  SSII     Hyperlipidemia: Continue home medication    Paroxysmal A.fib  H/o PPM placement  HTN  Continue Pradaxa twice a day   Continue Metoprolol, Norvasc hydralazine and isordil  Expect BP will get better with Iv diuresis as above  Add PRN nitropaste    Full Code  DVT Px: on Pradaxa      Subjective: Pt seen and examined at bedside. Started to breath better. NAD. Overnight events d/w RN    Reason for Visit: f/u respiratory failure and CHF    Review of Systems:  Symptom Y/N Comments  Symptom Y/N Comments   Fever/Chills n   Chest Pain n    Poor Appetite    Edema     Cough n   Abdominal Pain n    Sputum    Joint Pain     SOB/STEEN y improving  Pruritis/Rash     Nausea/vomit    Tolerating PT/OT     Diarrhea    Tolerating Diet y    Constipation    Other       Could NOT obtain due to:      Objective:     VITALS:   Last 24hrs VS reviewed since prior progress note.  Most recent are:  Patient Vitals for the past 24 hrs:   Temp Pulse Resp BP SpO2   08/10/20 0732 97.6 °F (36.4 °C) 82 18 (!) 175/93 90 %   08/10/20 0347 97.3 °F (36.3 °C) 84 18 139/80 96 %   20 2312 98.1 °F (36.7 °C) 82 18 142/76 94 %   20 2156  80  126/83 94 %   20 2022     93 %   20 1923 98.1 °F (36.7 °C) 84 18 159/77 94 %   20 1856  80  150/81    20 1730  80 13 (!) 121/97 92 %   20 1715  80 14 154/90 92 %   20 1700  80 14 167/90 98 %   20 1645  80 14 (!) 163/95 97 %   20 1630  80 14 166/89 97 % 08/09/20 1615 98.9 °F (37.2 °C) 80 15 150/90 97 %   08/09/20 1600  81 14 (!) 163/93 97 %   08/09/20 1545 98.9 °F (37.2 °C) 80 15 (!) 163/94 97 %   08/09/20 1530  80 16 158/87 98 %   08/09/20 1515  80 16 149/84 97 %   08/09/20 1500  85 14 (!) 156/116 96 %   08/09/20 1445  80 14 155/87 97 %   08/09/20 1430  80 17 145/90 96 %   08/09/20 1400  80 15 (!) 155/95 99 %   08/09/20 1206  80 21  95 %   08/09/20 1200  80 20 160/90 94 %   08/09/20 1145  80 28  96 %   08/09/20 1144 98.8 °F (37.1 °C) (!) 102 (!) 36 166/88 92 %   08/09/20 1143     100 %   08/09/20 1130  96 (!) 36  94 %   08/09/20 1125     91 %   08/09/20 1124    (!) 166/98        Intake/Output Summary (Last 24 hours) at 8/10/2020 1009  Last data filed at 8/10/2020 0942  Gross per 24 hour   Intake    Output 2600 ml   Net -2600 ml        PHYSICAL EXAM:  General: WD, WN. Alert, cooperative, no acute distress    EENT:  EOMI. Anicteric sclerae. MMM  Resp:  Crackles bilaterally. No accessory muscle use  CV:  Regular  rhythm,  ++ edema  GI:  Soft, Non distended, Non tender.  +Bowel sounds  Neurologic:  Alert and oriented X 3, normal speech,   Psych:   Good insight. Not anxious nor agitated  Skin:  No rashes. No jaundice    Reviewed most current lab test results and cultures  YES  Reviewed most current radiology test results   YES  Review and summation of old records today    NO  Reviewed patient's current orders and MAR    YES  PMH/SH reviewed - no change compared to H&P  ________________________________________________________________________  Care Plan discussed with:    Comments   Patient y    Family      RN y    Care Manager     Consultant                        Multidiciplinary team rounds were held today with , nursing, pharmacist and clinical coordinator. Patient's plan of care was discussed; medications were reviewed and discharge planning was addressed. ________________________________________________________________________  Total NON critical care TIME:  35 Minutes    Total CRITICAL CARE TIME Spent:   Minutes non procedure based      Comments   >50% of visit spent in counseling and coordination of care     ________________________________________________________________________  Jerardo Mccain MD     Procedures: see electronic medical records for all procedures/Xrays and details which were not copied into this note but were reviewed prior to creation of Plan. LABS:  I reviewed today's most current labs and imaging studies.   Pertinent labs include:  Recent Labs     08/10/20  0355 08/09/20  1140   WBC 5.8 8.3   HGB 10.0* 11.3*   HCT 33.3* 39.0    193     Recent Labs     08/10/20  0355 08/09/20  1140    143   K 3.3* 3.8    109*   CO2 30 31   * 178*   BUN 18 16   CREA 1.28* 1.36*   CA 7.6* 8.4*   ALB 2.9* 3.3*   TBILI 0.9 0.8   ALT 27 34       Signed: Jerardo Mccain MD

## 2020-08-10 NOTE — PROGRESS NOTES
[address] END OF SHIFT REPORT      Bedside and Verbal shift change report GIVEN TO DIAN Amado. Report included the following information SBAR and Kardex. SIGNIFICANT CHANGES DURING SHIFT:        CONCERNS TO ADDRESS WITH MD:          [address] NURSING NOTE   Admission Date 8/9/2020   Admission Diagnosis Respiratory failure ([address] [J96.90]   Consults IP CONSULT TO CARDIOLOGY      Cardiac Monitoring [x] Yes [] No      Purposeful Hourly Rounding [x] Yes    Ananya Score Total Score: 2   Ananya score 3 or > [x] Bed Alarm [] Avasys [] 1:1 sitter [] Patient refused (Signed refusal form in chart)   Preet Score Preet Score: 18   Preet score 14 or < [] PMT consult [] Wound Care consult    []  Specialty bed  [] Nutrition consult      Influenza Vaccine Received Flu Vaccine for Current Season (usually Sept-March): Not Flu Season           Oxygen needs? [] Room air Oxygen @  []1L    []2L    []3L   [x]4L    []5L   []6L via  NC   Chronic home O2 use? [] Yes [x] No  Perform O2 challenge test and document in progress note using [employer] (.Home[employer])      Last bowel movement        Urinary Catheter             LDAs               Peripheral IV 08/09/20 Right Antecubital (Active)   Site Assessment Clean, dry, & intact 08/10/20 0347   Phlebitis Assessment 0 08/10/20 0347   Infiltration Assessment 0 08/10/20 0347   Dressing Status Clean, dry, & intact 08/10/20 0347   Dressing Type Tape;Transparent 08/10/20 0347   Hub Color/Line Status Pink 08/10/20 0347                         Readmission Risk Assessment Tool Score High Risk            37       Total Score        3 Has Seen PCP in Last 6 Months (Yes=3, No=0)    4 IP Visits Last 12 Months (1-3=4, 4=9, >4=11)    5 Pt. Coverage (Medicare=5 , Medicaid, or Self-Pay=4)    25 Charlson Comorbidity Score (Age + Comorbid Conditions)        Criteria that do not apply:    . Living with Significant Other. Assisted Living. LTAC. SNF.  or   Rehab    Patient Length of Stay (>5 days = 3) Expected Length of Stay - - -   Actual Length of Stay 1

## 2020-08-10 NOTE — PROGRESS NOTES
Problem: Risk for Spread of Infection  Goal: Prevent transmission of infectious organism to others  Description: Prevent the transmission of infectious organisms to other patients, staff members, and visitors. Outcome: Progressing Towards Goal     Problem: Patient Education:  Go to Education Activity  Goal: Patient/Family Education  Outcome: Progressing Towards Goal     Problem: Falls - Risk of  Goal: *Absence of Falls  Description: Document Jacques Stade Fall Risk and appropriate interventions in the flowsheet. Outcome: Progressing Towards Goal  Note: Fall Risk Interventions:  Mobility Interventions: Bed/chair exit alarm, OT consult for ADLs, Patient to call before getting OOB, PT Consult for mobility concerns, PT Consult for assist device competence, Strengthening exercises (ROM-active/passive), Utilize walker, cane, or other assistive device         Medication Interventions: Bed/chair exit alarm, Patient to call before getting OOB, Teach patient to arise slowly                   Problem: Patient Education: Go to Patient Education Activity  Goal: Patient/Family Education  Outcome: Progressing Towards Goal     Problem: Pressure Injury - Risk of  Goal: *Prevention of pressure injury  Description: Document Preet Scale and appropriate interventions in the flowsheet. Outcome: Progressing Towards Goal  Note: Pressure Injury Interventions:       Moisture Interventions: Absorbent underpads, Apply protective barrier, creams and emollients, Check for incontinence Q2 hours and as needed, Minimize layers, Offer toileting Q_hr    Activity Interventions: Increase time out of bed, Pressure redistribution bed/mattress(bed type), PT/OT evaluation    Mobility Interventions: HOB 30 degrees or less, PT/OT evaluation, Pressure redistribution bed/mattress (bed type), Turn and reposition approx.  every two hours(pillow and wedges)    Nutrition Interventions: Document food/fluid/supplement intake    Friction and Shear Interventions: Apply protective barrier, creams and emollients, Lift team/patient mobility team, Minimize layers, Sit at 90-degree angle, HOB 30 degrees or less                Problem: Patient Education: Go to Patient Education Activity  Goal: Patient/Family Education  Outcome: Progressing Towards Goal     Problem: Diabetes Self-Management  Goal: *Disease process and treatment process  Description: Define diabetes and identify own type of diabetes; list 3 options for treating diabetes. Outcome: Progressing Towards Goal  Goal: *Incorporating nutritional management into lifestyle  Description: Describe effect of type, amount and timing of food on blood glucose; list 3 methods for planning meals. Outcome: Progressing Towards Goal  Goal: *Incorporating physical activity into lifestyle  Description: State effect of exercise on blood glucose levels. Outcome: Progressing Towards Goal  Goal: *Developing strategies to promote health/change behavior  Description: Define the ABC's of diabetes; identify appropriate screenings, schedule and personal plan for screenings. Outcome: Progressing Towards Goal  Goal: *Using medications safely  Description: State effect of diabetes medications on diabetes; name diabetes medication taking, action and side effects. Outcome: Progressing Towards Goal  Goal: *Monitoring blood glucose, interpreting and using results  Description: Identify recommended blood glucose targets  and personal targets. Outcome: Progressing Towards Goal  Goal: *Prevention, detection, treatment of acute complications  Description: List symptoms of hyper- and hypoglycemia; describe how to treat low blood sugar and actions for lowering  high blood glucose level. Outcome: Progressing Towards Goal  Goal: *Prevention, detection and treatment of chronic complications  Description: Define the natural course of diabetes and describe the relationship of blood glucose levels to long term complications of diabetes.   Outcome: Progressing Towards Goal  Goal: *Developing strategies to address psychosocial issues  Description: Describe feelings about living with diabetes; identify support needed and support network  Outcome: Progressing Towards Goal  Goal: *Insulin pump training  Outcome: Progressing Towards Goal  Goal: *Sick day guidelines  Outcome: Progressing Towards Goal  Goal: *Patient Specific Goal (EDIT GOAL, INSERT TEXT)  Outcome: Progressing Towards Goal     Problem: Patient Education: Go to Patient Education Activity  Goal: Patient/Family Education  Outcome: Progressing Towards Goal

## 2020-08-10 NOTE — CARDIO/PULMONARY
Cardiac Rehab Note: chart review Consult has been acknowledged Respiratory failure Echo ordered, cards to see. Smoking history assessed. Patient is a non smoker. Smoking Cessation Program link has not been added to the AVS. Patient not seen at this time. CP Rehab will see as indicated.

## 2020-08-10 NOTE — PROGRESS NOTES
Bedside and Verbal shift change report given to 5 [facility] (orienting nurse) (oncoming nurse) by Jacky Metzger (precepting nurse). Report included the following information SBAR, Kardex, Intake/Output, MAR, Recent Results and Cardiac Rhythm V Paced. 1332: Wound care at bedside

## 2020-08-10 NOTE — DIABETES MGMT
LAYO POLLACK  CLINICAL NURSE SPECIALIST CONSULT  [facility]    INITIAL NOTE    Presentation   Trupti Arellano is a 81 y.o. female admitted 8/9/20 with shortness of breath. Per patient she could not \"get comfortable\" yesterday evening while trying to sleep and her  called 911. EMS arrived and SPO2 was 80% on room air. Patient does not wear home O2 but a CPAP at night. She was also noted to have bilateral lower extremity edema. She has a history of CHF and had noted that she was more short of breath the last few days. COVID test is negative. Current clinical course has been uncomplicated. CXR:   Left chest pacing device is again seen. Stable heart size. Questionable diffuse   interstitial prominence. No significant pleural effusion. No evidence for   pneumothorax. Chest CT: IMPRESSION:  1. Small bilateral pleural effusions. 2. Diffuse ill-defined pulmonary groundglass opacification. Findings nonspecific  and should be correlated clinically. 3. Patchy bibasilar atelectasis versus pneumonia. DX: Acute hypoxic respiratory failure    HX: asthma, HLD, HTN, Obesity, depression, and anxiety. AICD and cardiac cath with stents in 2014. TX: IV diuresis, strict I&O's, CT of chest, O2 support, troponins, Pradaxa,     Diabetes: Patient has known Type 2 diabetes, treated with Amaryl PTA. Sister has diabetes but parents did not have diabetes.  Consulted by Provider for advanced diabetes nursing assessment and care, specifically related to   [x] Home management assessment    Diabetes-related medical history  Macrovascular disease  CAD and Myocardial infarction  Other associated conditions     Depression    Diabetes medication history  Drug class Currently in use Discontinued Never used   Biguanide  X    DDP-4 inhibitor       Sulfonylurea X     Thiazolidinedione      GLP-1 RA      SGLT-2 inhibitors      Basal insulin      Fixed Dose  Combinations      Bolus insulin        Subjective   I don't move around as much as I should because of my asthma.     Patient reports the following home diabetes self-care practices:  Eating pattern  [x] Breakfast Sausage and egg cheese biscuit and water  [x] Lunch  [place] food and a Pepsi  [x] Snacks Cupcake or fruit    Physical activity pattern: tries to do leg exercises while sitting in the chair        Objective   Physical exam  General Alert, oriented and in no acute distress. Conversant and cooperative. Vital Signs Normotensive, Paced. Afebrile  Visit Vitals  /73 (BP 1 Location: Left arm, BP Patient Position: At rest)   Pulse 80   Temp 97.6 °F (36.4 °C)   Resp 18   Ht 5' 4\" (1.626 m)   Wt 130.4 kg (287 lb 7.7 oz)   SpO2 95%   BMI 49.35 kg/m²       Laboratory  Lab Results   Component Value Date/Time    Hemoglobin A1c 8.4 (H) 08/10/2020 03:55 AM    Hemoglobin A1c (POC) 6.1 (A) 01/22/2018 01:42 PM     Lab Results   Component Value Date/Time    LDL, calculated 53 08/10/2020 03:55 AM     Lab Results   Component Value Date/Time    Creatinine (POC) 1.5 (H) 12/03/2019 01:27 PM    Creatinine 1.28 (H) 08/10/2020 03:55 AM     Lab Results   Component Value Date/Time    Sodium 140 08/10/2020 03:55 AM    Potassium 3.3 (L) 08/10/2020 03:55 AM    Chloride 108 08/10/2020 03:55 AM    CO2 30 08/10/2020 03:55 AM    Anion gap 2 (L) 08/10/2020 03:55 AM    Glucose 233 (H) 08/10/2020 03:55 AM    BUN 18 08/10/2020 03:55 AM    Creatinine 1.28 (H) 08/10/2020 03:55 AM    BUN/Creatinine ratio 14 08/10/2020 03:55 AM    GFR est AA 50 (L) 08/10/2020 03:55 AM    GFR est non-AA 42 (L) 08/10/2020 03:55 AM    Calcium 7.6 (L) 08/10/2020 03:55 AM    Bilirubin, total 0.9 08/10/2020 03:55 AM    Alk.  phosphatase 151 (H) 08/10/2020 03:55 AM    Protein, total 6.4 08/10/2020 03:55 AM    Albumin 2.9 (L) 08/10/2020 03:55 AM    Globulin 3.5 08/10/2020 03:55 AM    A-G Ratio 0.8 (L) 08/10/2020 03:55 AM    ALT (SGPT) 27 08/10/2020 03:55 AM     Lab Results   Component Value Date/Time    ALT (SGPT) 27 08/10/2020 03:55 AM       Factors affecting BG pattern  Factor Dose Comments   Nutrition:  Cardiac      Nothing Documented    Drugs:  Steroids   10mg IV once     On admission in ER   Pain On Lyrica at home Patient stated 0     Blood glucose pattern        Assessment and Plan   Nursing Diagnosis Risk for unstable blood glucose pattern   Nursing Intervention Domain 5497 Decision-making Support   Nursing Interventions Examined current inpatient diabetes control   Explored factors facilitating and impeding inpatient management  Identified self-management practices impeding diabetes control  Informed patient of rational for insulin strategy while hospitalized     Evaluation   This woman, with Type 2 diabetes, has not achieved inpatient blood glucose target of 100-180mg/dl. Inpatient blood glucose management has been impacted by a one time Dexamethasone injection yesterday and worsening shortness of breath over the last few days. Recommendations   Recommend:    Basal insulin: Lantus 20 units/D    Change Corrective insulin to:  [x] Insulin-resistant sensitivity (BMI >27)    Referral   [x] Diabetes Self-Management Training through [facility] (Phone [phone] to schedule appointment)    Billing Code(s)   I personally reviewed chart, notes, data and current medications in the medical record, and examined the patient at bedside before making care recommendations.      [x] [id number] IP subsequent hospital care - 30 minutes      TARYN Arteaga  [facility]  Access via [employer]

## 2020-08-10 NOTE — PROGRESS NOTES
Reason for Admission:   CHF//respiratory failure//bilateral LE edema                  RUR Score:     23  PCP: First and Last name:  Veena Camargo   Name of Practice: [facility]   Are you a current patient: Yes/No: yes   Approximate date of last visit: July   Can you participate in a virtual visit if needed: yes    Do you (patient/family) have any concerns for transition/discharge? Pt and spouse do not have any concerns              Plan for utilizing home health:   Used [facility] in 2018 but does not feel she needs HHC at present time  Current Advanced Directive/Advance Care Plan:  She states she has a completed one on the refrigerator and she will ask her spouse to bring it in tomorrow            Transition of Care Plan:        -I spoke with pt//spouse regarding d/c planning. She states she lives with spouse; their 2 daughters live on their own; she saw her PCP in July; she is a full code; has Medicare//BC  -she uses "[facility]w [address] in 2018  -she is a CHF bundle pt. BPCI-A letter given to pt  -#2 RIAZ is dtr Rosemarie Fontenot, per pt, at [phone]  -she is retired from being a  since her MI  -d/c plans discussed in [facility] where MD anticipates Wednesday d/c if stable and all in agreement  -please wean oxygen as appropriate  -they live in a 1 story home with 1 SHAQ  -she has a cane//rollator at home  -her spouse will transport ~10a on day of d/c    Care Management Interventions  PCP Verified by CM:  Yes  Transition of Care Consult (CM Consult): Discharge Planning  MyChart Signup: No  Discharge Durable Medical Equipment: No  Physical Therapy Consult: No  Occupational Therapy Consult: No  Speech Therapy Consult: No  Current Support Network: Lives with Spouse  Confirm Follow Up Transport: Family  Discharge Location  Discharge Placement: Home

## 2020-08-11 ENCOUNTER — APPOINTMENT (OUTPATIENT)
Dept: NON INVASIVE DIAGNOSTICS | Age: 70
DRG: 291 | End: 2020-08-11
Attending: FAMILY MEDICINE
Payer: MEDICARE

## 2020-08-11 LAB
ANION GAP SERPL CALC-SCNC: 4 MMOL/L (ref 5–15)
BASOPHILS # BLD: 0 K/UL (ref 0–0.1)
BASOPHILS NFR BLD: 0 % (ref 0–1)
BUN SERPL-MCNC: 20 MG/DL (ref 6–20)
BUN/CREAT SERPL: 15 (ref 12–20)
CALCIUM SERPL-MCNC: 7.7 MG/DL (ref 8.5–10.1)
CHLORIDE SERPL-SCNC: 110 MMOL/L (ref 97–108)
CO2 SERPL-SCNC: 29 MMOL/L (ref 21–32)
CREAT SERPL-MCNC: 1.3 MG/DL (ref 0.55–1.02)
DIFFERENTIAL METHOD BLD: ABNORMAL
ECHO AO ROOT DIAM: 2.74 CM
ECHO AV AREA PEAK VELOCITY: 1.43 CM2
ECHO AV AREA PEAK VELOCITY: 1.44 CM2
ECHO AV AREA VTI: 1.35 CM2
ECHO AV AREA/BSA VTI: 0.6 CM2/M2
ECHO AV CUSP MM: 1.68 CM
ECHO AV MEAN GRADIENT: 9.31 MMHG
ECHO AV PEAK GRADIENT: 14.5 MMHG
ECHO AV PEAK GRADIENT: 14.63 MMHG
ECHO AV PEAK VELOCITY: 190.39 CM/S
ECHO AV PEAK VELOCITY: 191.21 CM/S
ECHO AV VTI: 32.98 CM
ECHO EST RA PRESSURE: 10 MMHG
ECHO LA AREA 4C: 20.55 CM2
ECHO LA MAJOR AXIS: 3.54 CM
ECHO LA MINOR AXIS: 1.56 CM
ECHO LA TO AORTIC ROOT RATIO: 1.74
ECHO LA TO AORTIC ROOT RATIO: 1.74
ECHO LA VOL 2C: 101.84 ML (ref 22–52)
ECHO LA VOL 4C: 63.73 ML (ref 22–52)
ECHO LA VOL BP: 86.95 ML (ref 22–52)
ECHO LA VOL/BSA BIPLANE: 38.25 ML/M2 (ref 16–28)
ECHO LA VOLUME INDEX A2C: 44.8 ML/M2 (ref 16–28)
ECHO LA VOLUME INDEX A4C: 28.04 ML/M2 (ref 16–28)
ECHO LV INTERNAL DIMENSION DIASTOLIC: 4.42 CM (ref 3.9–5.3)
ECHO LV INTERNAL DIMENSION SYSTOLIC: 3.79 CM
ECHO LV IVSD: 1.7 CM (ref 0.6–0.9)
ECHO LV IVSS: 2.41 CM
ECHO LV MASS 2D: 342.1 G (ref 67–162)
ECHO LV MASS INDEX 2D: 150.5 G/M2 (ref 43–95)
ECHO LV POSTERIOR WALL DIASTOLIC: 1.8 CM (ref 0.6–0.9)
ECHO LV POSTERIOR WALL SYSTOLIC: 2.62 CM
ECHO LVOT CARDIAC OUTPUT: 3.55 LITER/MINUTE
ECHO LVOT DIAM: 2.1 CM
ECHO LVOT PEAK GRADIENT: 2.5 MMHG
ECHO LVOT PEAK GRADIENT: 2.52 MMHG
ECHO LVOT PEAK VELOCITY: 79.01 CM/S
ECHO LVOT PEAK VELOCITY: 79.36 CM/S
ECHO LVOT SV: 44.6 ML
ECHO LVOT VTI: 12.89 CM
ECHO MV AREA PHT: 4.22 CM2
ECHO MV E DECELERATION TIME (DT): 0.29 S
ECHO MV E VELOCITY: 105.29 CM/S
ECHO MV PRESSURE HALF TIME (PHT): 0.05 S
ECHO PV MAX VELOCITY: 87.24 CM/S
ECHO PV PEAK INSTANTANEOUS GRADIENT SYSTOLIC: 3.06 MMHG
ECHO RIGHT VENTRICULAR SYSTOLIC PRESSURE (RVSP): 37.83 MMHG
ECHO TV REGURGITANT MAX VELOCITY: 261.82 CM/S
ECHO TV REGURGITANT PEAK GRADIENT: 27.83 MMHG
EOSINOPHIL # BLD: 0.1 K/UL (ref 0–0.4)
EOSINOPHIL NFR BLD: 2 % (ref 0–7)
ERYTHROCYTE [DISTWIDTH] IN BLOOD BY AUTOMATED COUNT: 18.2 % (ref 11.5–14.5)
GLUCOSE BLD STRIP.AUTO-MCNC: 119 MG/DL (ref 65–100)
GLUCOSE BLD STRIP.AUTO-MCNC: 184 MG/DL (ref 65–100)
GLUCOSE BLD STRIP.AUTO-MCNC: 209 MG/DL (ref 65–100)
GLUCOSE BLD STRIP.AUTO-MCNC: 213 MG/DL (ref 65–100)
GLUCOSE SERPL-MCNC: 128 MG/DL (ref 65–100)
HCT VFR BLD AUTO: 36.6 % (ref 35–47)
HGB BLD-MCNC: 10.9 G/DL (ref 11.5–16)
IMM GRANULOCYTES # BLD AUTO: 0.1 K/UL (ref 0–0.04)
IMM GRANULOCYTES NFR BLD AUTO: 1 % (ref 0–0.5)
LVOT MG: 1.08 MMHG
LYMPHOCYTES # BLD: 1.3 K/UL (ref 0.8–3.5)
LYMPHOCYTES NFR BLD: 18 % (ref 12–49)
MCH RBC QN AUTO: 26.7 PG (ref 26–34)
MCHC RBC AUTO-ENTMCNC: 29.8 G/DL (ref 30–36.5)
MCV RBC AUTO: 89.7 FL (ref 80–99)
MONOCYTES # BLD: 0.6 K/UL (ref 0–1)
MONOCYTES NFR BLD: 8 % (ref 5–13)
NEUTS SEG # BLD: 5.2 K/UL (ref 1.8–8)
NEUTS SEG NFR BLD: 71 % (ref 32–75)
NRBC # BLD: 0 K/UL (ref 0–0.01)
NRBC BLD-RTO: 0 PER 100 WBC
PLATELET # BLD AUTO: 184 K/UL (ref 150–400)
PMV BLD AUTO: 11.5 FL (ref 8.9–12.9)
POTASSIUM SERPL-SCNC: 3.2 MMOL/L (ref 3.5–5.1)
RBC # BLD AUTO: 4.08 M/UL (ref 3.8–5.2)
SERVICE CMNT-IMP: ABNORMAL
SODIUM SERPL-SCNC: 143 MMOL/L (ref 136–145)
WBC # BLD AUTO: 7.3 K/UL (ref 3.6–11)

## 2020-08-11 PROCEDURE — 93306 TTE W/DOPPLER COMPLETE: CPT

## 2020-08-11 PROCEDURE — 77030027138 HC INCENT SPIROMETER -A

## 2020-08-11 PROCEDURE — 82962 GLUCOSE BLOOD TEST: CPT

## 2020-08-11 PROCEDURE — 74011636637 HC RX REV CODE- 636/637: Performed by: INTERNAL MEDICINE

## 2020-08-11 PROCEDURE — 74011250637 HC RX REV CODE- 250/637: Performed by: INTERNAL MEDICINE

## 2020-08-11 PROCEDURE — 74011250637 HC RX REV CODE- 250/637: Performed by: FAMILY MEDICINE

## 2020-08-11 PROCEDURE — 80048 BASIC METABOLIC PNL TOTAL CA: CPT

## 2020-08-11 PROCEDURE — 74011000250 HC RX REV CODE- 250: Performed by: FAMILY MEDICINE

## 2020-08-11 PROCEDURE — 36415 COLL VENOUS BLD VENIPUNCTURE: CPT

## 2020-08-11 PROCEDURE — 94660 CPAP INITIATION&MGMT: CPT

## 2020-08-11 PROCEDURE — 77010033678 HC OXYGEN DAILY

## 2020-08-11 PROCEDURE — 65660000001 HC RM ICU INTERMED STEPDOWN

## 2020-08-11 PROCEDURE — 85025 COMPLETE CBC W/AUTO DIFF WBC: CPT

## 2020-08-11 RX ORDER — AMLODIPINE BESYLATE 5 MG/1
5 TABLET ORAL
Status: COMPLETED | OUTPATIENT
Start: 2020-08-11 | End: 2020-08-11

## 2020-08-11 RX ORDER — AMLODIPINE BESYLATE 5 MG/1
10 TABLET ORAL DAILY
Status: DISCONTINUED | OUTPATIENT
Start: 2020-08-12 | End: 2020-08-12 | Stop reason: HOSPADM

## 2020-08-11 RX ORDER — ISOSORBIDE MONONITRATE 30 MG/1
60 TABLET, EXTENDED RELEASE ORAL DAILY
Status: DISCONTINUED | OUTPATIENT
Start: 2020-08-12 | End: 2020-08-12 | Stop reason: HOSPADM

## 2020-08-11 RX ORDER — ISOSORBIDE DINITRATE 20 MG/1
30 TABLET ORAL 2 TIMES DAILY
Status: COMPLETED | OUTPATIENT
Start: 2020-08-11 | End: 2020-08-11

## 2020-08-11 RX ORDER — POTASSIUM CHLORIDE 750 MG/1
40 TABLET, FILM COATED, EXTENDED RELEASE ORAL ONCE
Status: COMPLETED | OUTPATIENT
Start: 2020-08-11 | End: 2020-08-11

## 2020-08-11 RX ADMIN — POTASSIUM CHLORIDE 40 MEQ: 750 TABLET, FILM COATED, EXTENDED RELEASE ORAL at 12:37

## 2020-08-11 RX ADMIN — PANTOPRAZOLE SODIUM 40 MG: 40 TABLET, DELAYED RELEASE ORAL at 09:23

## 2020-08-11 RX ADMIN — ASPIRIN 81 MG CHEWABLE TABLET 81 MG: 81 TABLET CHEWABLE at 09:20

## 2020-08-11 RX ADMIN — HYDRALAZINE HYDROCHLORIDE 25 MG: 25 TABLET, FILM COATED ORAL at 17:39

## 2020-08-11 RX ADMIN — ISOSORBIDE DINITRATE 30 MG: 20 TABLET ORAL at 09:22

## 2020-08-11 RX ADMIN — PREGABALIN 200 MG: 100 CAPSULE ORAL at 17:42

## 2020-08-11 RX ADMIN — INSULIN GLARGINE 10 UNITS: 100 INJECTION, SOLUTION SUBCUTANEOUS at 22:05

## 2020-08-11 RX ADMIN — AMLODIPINE BESYLATE 2.5 MG: 5 TABLET ORAL at 09:19

## 2020-08-11 RX ADMIN — INSULIN LISPRO 3 UNITS: 100 INJECTION, SOLUTION INTRAVENOUS; SUBCUTANEOUS at 17:39

## 2020-08-11 RX ADMIN — Medication 10 ML: at 09:27

## 2020-08-11 RX ADMIN — DABIGATRAN ETEXILATE MESYLATE 150 MG: 150 CAPSULE ORAL at 17:39

## 2020-08-11 RX ADMIN — VENLAFAXINE HYDROCHLORIDE 150 MG: 150 CAPSULE, EXTENDED RELEASE ORAL at 17:43

## 2020-08-11 RX ADMIN — BUMETANIDE 1 MG: 0.25 INJECTION INTRAMUSCULAR; INTRAVENOUS at 20:42

## 2020-08-11 RX ADMIN — VENLAFAXINE HYDROCHLORIDE 150 MG: 150 CAPSULE, EXTENDED RELEASE ORAL at 09:24

## 2020-08-11 RX ADMIN — AMLODIPINE BESYLATE 5 MG: 5 TABLET ORAL at 12:36

## 2020-08-11 RX ADMIN — INSULIN LISPRO 2 UNITS: 100 INJECTION, SOLUTION INTRAVENOUS; SUBCUTANEOUS at 22:05

## 2020-08-11 RX ADMIN — DABIGATRAN ETEXILATE MESYLATE 150 MG: 150 CAPSULE ORAL at 09:21

## 2020-08-11 RX ADMIN — Medication 10 ML: at 06:17

## 2020-08-11 RX ADMIN — HYDRALAZINE HYDROCHLORIDE 25 MG: 25 TABLET, FILM COATED ORAL at 09:22

## 2020-08-11 RX ADMIN — BUMETANIDE 1 MG: 0.25 INJECTION INTRAMUSCULAR; INTRAVENOUS at 09:21

## 2020-08-11 RX ADMIN — ATORVASTATIN CALCIUM 40 MG: 40 TABLET, FILM COATED ORAL at 22:05

## 2020-08-11 RX ADMIN — METOPROLOL SUCCINATE 100 MG: 50 TABLET, EXTENDED RELEASE ORAL at 09:23

## 2020-08-11 RX ADMIN — INSULIN LISPRO 2 UNITS: 100 INJECTION, SOLUTION INTRAVENOUS; SUBCUTANEOUS at 12:37

## 2020-08-11 RX ADMIN — Medication 10 ML: at 17:43

## 2020-08-11 RX ADMIN — PREGABALIN 200 MG: 100 CAPSULE ORAL at 09:24

## 2020-08-11 RX ADMIN — ISOSORBIDE DINITRATE 30 MG: 20 TABLET ORAL at 17:40

## 2020-08-11 RX ADMIN — Medication 10 ML: at 22:06

## 2020-08-11 NOTE — PROGRESS NOTES
Problem: Risk for Spread of Infection  Goal: Prevent transmission of infectious organism to others  Description: Prevent the transmission of infectious organisms to other patients, staff members, and visitors. Outcome: Progressing Towards Goal     Problem: Falls - Risk of  Goal: *Absence of Falls  Description: Document Krishna Tripp Fall Risk and appropriate interventions in the flowsheet. Outcome: Progressing Towards Goal  Note: Fall Risk Interventions:  Mobility Interventions: Bed/chair exit alarm, Communicate number of staff needed for ambulation/transfer, Patient to call before getting OOB         Medication Interventions: Bed/chair exit alarm, Evaluate medications/consider consulting pharmacy, Patient to call before getting OOB, Teach patient to arise slowly                   Problem: Pressure Injury - Risk of  Goal: *Prevention of pressure injury  Description: Document Preet Scale and appropriate interventions in the flowsheet.   Outcome: Progressing Towards Goal  Note: Pressure Injury Interventions:       Moisture Interventions: Absorbent underpads, Maintain skin hydration (lotion/cream)    Activity Interventions: Increase time out of bed, Pressure redistribution bed/mattress(bed type), PT/OT evaluation    Mobility Interventions: Chair cushion, Float heels, HOB 30 degrees or less, PT/OT evaluation    Nutrition Interventions: Document food/fluid/supplement intake, Offer support with meals,snacks and hydration    Friction and Shear Interventions: Apply protective barrier, creams and emollients, Foam dressings/transparent film/skin sealants

## 2020-08-11 NOTE — PROGRESS NOTES
VISHNU:  -MD//cardiology anticipate d/c tomorrow if all in agreement  -d/c plan discussed with pt/spouse.   Spouse will transport ~10am Wednesday  -she declines [facility]  -no 2nd IM letter needed as her admission date is 8/09

## 2020-08-11 NOTE — PROGRESS NOTES
[address] END OF SHIFT REPORT      Bedside and Verbal shift change report GIVEN TO /DIAN Mares. Report included the following information SBAR and Kardex. SIGNIFICANT CHANGES DURING SHIFT:        CONCERNS TO ADDRESS WITH MD:          [address] NURSING NOTE   Admission Date 8/9/2020   Admission Diagnosis Respiratory failure ([facility] [address] [J96.90]   Consults IP CONSULT TO CARDIOLOGY      Cardiac Monitoring [x] Yes [] No      Purposeful Hourly Rounding [x] Yes    Ananya Score Total Score: 2   Ananya score 3 or > [x] Bed Alarm [] Avasys [] 1:1 sitter [] Patient refused (Signed refusal form in chart)   Preet Score Preet Score: 19   Preet score 14 or < [] PMT consult [] Wound Care consult    []  Specialty bed  [] Nutrition consult      Influenza Vaccine Received Flu Vaccine for Current Season (usually Sept-March): Not Flu Season           Oxygen needs? [x] Room air Oxygen @  []1L    []2L    []3L   []4L    []5L   []6L via  NC   Chronic home O2 use? [] Yes [x] No  Perform O2 challenge test and document in progress note using smartphrase (.Homeoxygen)      Last bowel movement Last Bowel Movement Date: 08/10/20      Urinary Catheter             LDAs               Peripheral IV 08/09/20 Right Antecubital (Active)   Site Assessment Clean, dry, & intact 08/10/20 1443   Phlebitis Assessment 0 08/10/20 1443   Infiltration Assessment 0 08/10/20 1443   Dressing Status Clean, dry, & intact 08/10/20 1443   Dressing Type Transparent;Tape 08/10/20 1443   Hub Color/Line Status Pink;Capped 08/10/20 1443   Action Taken Open ports on tubing capped 08/10/20 1443   Alcohol Cap Used Yes 08/10/20 1443                         Readmission Risk Assessment Tool Score High Risk            37       Total Score        3 Has Seen PCP in Last 6 Months (Yes=3, No=0)    4 IP Visits Last 12 Months (1-3=4, 4=9, >4=11)    5 Pt.  Coverage (Medicare=5 , Medicaid, or Self-Pay=4)    25 Charlson Comorbidity Score (Age + Comorbid Conditions)        Criteria that do not apply:    . Living with Significant Other. Assisted Living. LTAC. SNF.  or   Rehab    Patient Length of Stay (>5 days = 3)       Expected Length of Stay 4d 2h   Actual Length of Stay 2

## 2020-08-11 NOTE — PROGRESS NOTES
Hospitalist Progress Note    NAME: Bernadette Spangler   :  1950   MRN:  [id number]     Assessment/Plan       Acute Hypoxic Respiratory Failure POA   Due to Acute on chronic systolic heart failure with EF 21-25%  CKD3  Continue Bumex 1mg IV BID  Monitor I/Os, daily weight and lytes  Cardiology consult  Looks volume overloaded  Pt  weaned down to room air  Appreciated cardiologist help, will continue diuresing today, hopefully DC tomorrow a.m.     Diabetes mellitus type 2  Poorly controlled  HbA1C 8.4  Holding Amaryl for now  SSII     Hyperlipidemia: Continue home medication    Paroxysmal A.fib  H/o PPM placement  HTN  Continue Pradaxa   Continue Metoprolol, Norvasc hydralazine and isordil        Full Code  DVT Px: on Pradaxa      Subjective: Pt seen and examined at bedside. Patient reports, her breathing has improved, sitting on chair.  -3 L past 24 hours    Reason for Visit: f/u respiratory failure and CHF    Review of Systems:  Symptom Y/N Comments  Symptom Y/N Comments   Fever/Chills n   Chest Pain n    Poor Appetite    Edema     Cough n   Abdominal Pain n    Sputum    Joint Pain     SOB/STEEN y improving  Pruritis/Rash     Nausea/vomit    Tolerating PT/OT     Diarrhea    Tolerating Diet y    Constipation    Other       Could NOT obtain due to:      Objective:     VITALS:   Last 24hrs VS reviewed since prior progress note.  Most recent are:  Patient Vitals for the past 24 hrs:   Temp Pulse Resp BP SpO2   20 1157 97.6 °F (36.4 °C) 80 18 149/81 94 %   20 0741 98 °F (36.7 °C) 81 18 (!) 138/96 90 %   20 0357 98 °F (36.7 °C) 80 18 145/78 95 %   20 0227     95 %   20 0047     94 %   08/10/20 2345 98.2 °F (36.8 °C) 80 18 142/81 93 %   08/10/20 2117  80  (!) 149/92    08/10/20 1948 97.8 °F (36.6 °C) 80 18 149/71 93 %   08/10/20 1443 97.7 °F (36.5 °C) 80 18 150/85 92 %       Intake/Output Summary (Last 24 hours) at 2020 1205  Last data filed at 2020 0959  Gross per 24 hour   Intake 360 ml   Output 3400 ml   Net -3040 ml        PHYSICAL EXAM:  General: WD, WN. Alert, cooperative, no acute distress    EENT:  EOMI. Anicteric sclerae. MMM  Resp:  Crackles bilaterally. No accessory muscle use  CV:  Regular  rhythm,  + edema  GI:  Soft, Non distended, Non tender.  +Bowel sounds  Neurologic:  Alert and oriented X 3, normal speech,   Psych:   Good insight. Not anxious nor agitated  Skin:  No rashes. No jaundice    Reviewed most current lab test results and cultures  YES  Reviewed most current radiology test results   YES  Review and summation of old records today    NO  Reviewed patient's current orders and MAR    YES  PMH/SH reviewed - no change compared to H&P  ________________________________________________________________________  Care Plan discussed with:    Comments   Patient y    Family      RN y    Care Manager     Consultant                        Multidiciplinary team rounds were held today with , nursing, pharmacist and clinical coordinator. Patient's plan of care was discussed; medications were reviewed and discharge planning was addressed. ________________________________________________________________________  Total NON critical care TIME:  35 Minutes    Total CRITICAL CARE TIME Spent:   Minutes non procedure based      Comments   >50% of visit spent in counseling and coordination of care     ________________________________________________________________________  Fanny Saldana MD     Procedures: see electronic medical records for all procedures/Xrays and details which were not copied into this note but were reviewed prior to creation of Plan. LABS:  I reviewed today's most current labs and imaging studies.   Pertinent labs include:  Recent Labs     08/11/20  0508 08/10/20  0355 08/09/20  1140   WBC 7.3 5.8 8.3   HGB 10.9* 10.0* 11.3*   HCT 36.6 33.3* 39.0    168 193     Recent Labs     08/11/20  0508 08/10/20  0355 08/09/20  1140   NA 143 140 143   K 3.2* 3.3* 3.8   * 108 109*   CO2 29 30 31   * 233* 178*   BUN 20 18 16   CREA 1.30* 1.28* 1.36*   CA 7.7* 7.6* 8.4*   ALB  --  2.9* 3.3*   TBILI  --  0.9 0.8   ALT  --  27 34       Signed: Colby Greene MD

## 2020-08-11 NOTE — INTERDISCIPLINARY ROUNDS
[address] INTERDISCIPLINARY ROUNDS Cardiopulmonary Care Interdisciplinary Rounds were held today to discuss patient's plan of care and outcomes. The following members were present: NP/Physician, Pharmacy, Nursing and Case Management. PLAN OF CARE:  
Continue current treatment plan Expected Length of Stay:  4d 2h

## 2020-08-11 NOTE — PROGRESS NOTES
Cardiology Progress Note  8/11/2020     Admit Date: 8/9/2020  Admit Diagnosis: Respiratory failure ([facility], [address] [J96.90]  CC: none currently  Cardiologist:  Dr Little/Obie. Cardiac Assessment/Plan:    1) CAD (Prox LAD ISELA 4/2014 for NQWMI), Neg PET for ischemia 9/2018. Min CAD 2/2019.  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 Arvell Dun was too expensive. *EF 15-20% 9/2018. EF 30% w/ minimal CAD at cath 2/2019. EF 20-25% (m-mod MR; mod TR) 8/2019.  3) HTN,   4) AFIB: PAfib (RFA 4/2016 and 1/2017), Recurrent/persistent afib 2017/2018: AV node ablation 10/2018. *Chronic AC w/ pradaxa. 5) DM,   6) SHAYLA (on CPAP),   7) CKD (Cr 1.5 range). Aldactone stopped in 2014. Cr 2 11/2018 (after becky): Cr 1.5 12/2018 & 8/2019; She has reconnected with Dr. Serenity Fink. 8) St Don PPM 7/2014 for bradycardia while on therapy for AFib: changed to BiV ICD 10/2018.  9) nephrolithiasis  10) cholecystectomy 11/2018.  11) LE venous insufficiency:  B SFV ablation 3/2020. Continues to use compression stockings/wraps. 12) Obesity: 240# 2014; 262# 9/2019. 270 to 281# 2020.     Admitted with resp failure/CHF. +salty diet; Compliant w CPAP & meds. Rec 8/10: agree w/ diuresis; Needs better BP control.      8/11: BPs 130-170; HR 80s; 90-93% (RA). Neg 5.5L  Hg 10.9; Cr 1.3; K 3.2; Nl TSH. Cardiac meds: norvasc 2.5; asa81; lipitor 40; Bumex 1 IV bid; pradaxa bid; hydralazine 25 bid; imdur 30; toprol  qday;     Rec: Check sats with ambulation; Imdur to previous 60 qday; norvasc to 10. Dispo per primary team: ?d/c tomorrow. Hospital Problem List:  Active Problems:   Respiratory failure ([address] (8/9/2020)   ____________________________________________________________________  Jaswinder Muniz is a 81 y.o. female presents with Respiratory failure ([address] [J96.90].      As noted in H&P: \"69 y.o. female who presents with with SOB.     History was obtained from the patient, although patient is a poor historian and currently on BiPAP     Patient started experiencing some shortness of breath that started 2 to 3 days back.  Started experiencing increased lower extremity edema, trouble laying down, nausea and poor appetite.  Today the shortness of breath got worse and patient decided to come to the hospital. Cande Meade was found to be in acute respiratory distress and was put on BiPAP in the ER and was requested to be admitted under the hospitalist service. Cande Meade denies any fever or chills associated with her symptoms.  Patient reports that her legs have been swelling up and she feels as if they are holding fluid.     The patient denies any Headache, blurry vision, sore throat, trouble swallowing, trouble with speech, chest pain,cough, fever, chills, N/V/D, abd pain, urinary symptoms, constipation, recent travels, sick contacts, focal or generalized oneirological symptoms,  falls, injuries, rashes, contact with COVID-19 diagnosed patients, hematemesis, melena, hemoptysis, hematuria, rashes, denies starting any new medications and denies any other concerns or problems besides as mentioned above. \"     ______________________________________________________________________     The patient reports no angina; few min resting CP yesterday: gone spont. Worse STEEN PTA, improved now; No change in LE edema. No PND, orthopnea, palpitations, pre-syncope, syncope. No current complaints.     ECG: afib; vpaced. Tele: afib; vpaced. CXR: \"Questionable diffuse interstitial prominence. \"  Chest CT w/o contrast: \"1. Small bilateral pleural effusions. 2. Diffuse ill-defined pulmonary groundglass opacification. Findings nonspecific  and should be correlated clinically. 3. Patchy bibasilar atelectasis versus pneumonia. \"     Notable labs: Hg 10; K 3.3 from 3.8; Cr 1.28 from 1.36 (prev 1.5/GFR 35).  LDL 53.  _____________________________________________________________________  Notable prior cardiac history:  @ OV 7/17/20:  Ms Antonio Moraes has a h/o:  1) CAD (Prox LAD ISELA 4/2014 for NQWMI), Neg PET for ischemia 9/2018. Min CAD 2/2019.  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 Samantha Soles was too expensive. *EF 15-20% 9/2018. EF 30% w/ minimal CAD at cath 2/2019. EF 20-25% (m-mod MR; mod TR) 8/2019.  3) HTN,   4) AFIB: PAfib (RFA 4/2016 and 1/2017), Recurrent/persistent afib 2017/2018: AV node ablation 10/2018. *Chronic AC w/ pradaxa. 5) DM,   6) SHAYLA (on CPAP),   7) CKD (Cr 1.5 range). Aldactone stopped in 2014. Cr 2 11/2018 (after becky): Cr 1.5 12/2018 & 8/2019; She has reconnected with Dr. Genevieve Lund. 8) St Don PPM 7/2014 for bradycardia while on therapy for AFib: changed to BiV ICD 10/2018.  9) nephrolithiasis  10) cholecystectomy 11/2018.  11) LE venous insufficiency:  B SFV ablation 3/2020. Continues to use compression stockings/wraps. 12) Obesity: 240# 2014; 262# 9/2019. 270 to 281# 2020.     11/2019: Chronic STEEN and fatigue are unchanged but bothersome. Now having some upper back pain with it and thinks that is due to AF. States discomfort worsens with exertion and improves with rest. No syncope. BLE edema unchanged. Normally wears wraps/stockings.     1/2020:  Continued LE edema & wears wraps/stockings for this. No chest pain. Decreasing dyspnea with increasing exercise tolerance.     7/2020:  No chest pain nor change in dyspnea. Using CPAP. Had palpitations x1 since last visit. No falling or bleeding.     IMPRESSION AND PLAN  01. Atherosclerotic heart disease of native coronary artery without angina pectoris (I25.10): Minimal CAD @ cath 2/2019.     We discussed the signs and symptoms of unstable angina, myocardial infarction and malignant arrhythmia. The patient knows to seek immediate medical attention should they occur. 02. Dilated cardiomyopathy (I42.0):  Mixed ischemic/non-ischemic (tachycardia mediated) CM previously.   Her EF improved but then has worsened again as noted above. EF 30% 2/2019. The patient has an ICD in place. 03. Chronic combined systolic (congestive) and diastolic (congestive) heart failure (I50.42): The patient is compliant with their CHF regimen. is tolerating the current beta-blocker dose. 04. Longstanding persistent atrial fibrillation (I48.11): As per Dr. Rosann Dubin, off amiodarone & now s/p AV node ablation. She remains on anticoagulation. ECG done   05. Hyp hrt & chr kdny dis w hrt fail and stg 1-4/unsp chr kdny (I13.0):  Finally controlled with addition of Imdur. I have made no changes to the present regimen. 06. Chronic venous insufficiency (I87.2): As noted by Dr Mukul Hernandez: Chloe Ruiz has bilateral lower extremity edema also has signs of chronic venous stasis with discoloration of anterior aspect of legs. She also has congestive heart failure and chronic kidney disease. Her etiology for leg swelling is multifactorial.  She has undergone bilateral GSV RF ablation. She has not seen much improvement yet. I have advised her to continue to wear the compression stocking and do the leg elevations for next 6 months. She cannot exercise due to  shortness of breath. I will see her back in 6 months. \"   07. Mixed hyperlipidemia (E78.2):  Lipid labs drawn by PCP. The patient is tolerating lipid lowering therapy well. 08. Chronic kidney disease, stage 3 (moderate) (N18.3):  Cr 1.24/GFR46 11/2015. Cr 1.32/GFR 43 1/2017. Cr 2 11/2018 after becky. Creatinine 1.5 12/2018. 09. Localized edema (R60.0):  Chronic. She avoids salt. This condition is stable. 11. Presence of automatic (implantable) cardiac defibrillator (Z95.810): This is a biventricular device. will continue to be followed in device clinic. 11. Type 2 diabetes mellitus with other specified complication (L22.65):  Lipids & HTN as noted above; DM managed by other MD.   12. Long term (current) use of aspirin (Z79.82): This condition is stable.    15. Long term (current) use of anticoagulants (Z79.01): This condition is stable. Indicated for atrial fibrillation. No bleeding. If she has recurrent falls, she knows to call for re-evaluation of anticoagulation. 14. Body mass index (BMI) 40.0-44.9, adult (Z68.41): Worsened since last seen. The patient was instructed on AHA diet and regular exercise.      ORDERS:  1 Dietary management education, guidance, and counseling   2 ECG done   3 Return office visit with Omar Rasmussen. Anabel BERRY in 1 Year.    4 The patient was instructed on AHA diet and regular exercise.         7/17/20 MEDICATION LIST  Medication Sig Desc   albuterol sulfate HFA 90 mcg/actuation aerosol inhaler inhale 2 puff by inhalation route  every 4 - 6 hours as needed   amlodipine 10 mg tablet take 1 tablet by oral route  every day   aspirin 81 mg tablet,delayed release take 1 tablet by oral route  every day   atorvastatin 40 mg tablet take 1 tablet by oral route  every evening   Ayr Saline 0.65 % nasal spray aerosol     bumetanide 2 mg tablet take 1 tablet by oral route  every day   clonazepam 0.5 mg tablet take 1 tablet by oral route  every day   glimepiride 4 mg tablet take 1 tablet by oral twice a day   hydralazine 25 mg tablet take 1 tablet by oral route 2 times every day with food   isosorbide mononitrate ER 30 mg tablet,extended release 24 hr take 1 tablet by oral route  twice a day   lidocaine 4 % topical cream     lisinopril 40 mg tablet take 1 tablet by oral route  every day   Lyrica 200 mg capsule take 1 capsule by oral route 2 times every day   metoprolol succinate  mg tablet,extended release 24 hr TAKE 1 TABLET EVERY DAY   omeprazole 40 mg capsule,delayed release take 1 capsule by oral route  every day before a meal   potassium chloride ER 10 mEq capsule,extended release take 1 Capsule by oral route 3 times daily   Pradaxa 150 mg capsule take 1 capsule by oral route 2 times every day   Premarin 0.625 mg/gram vaginal cream insert 0.5 applicatorful by vaginal route every day cyclically, 3 weeks on and 1 week off   Symbicort 160 mcg-4.5 mcg/actuation HFA aerosol inhaler inhale 2 puff by inhalation route 2 times every day in the morning and evening   venlafaxine  mg capsule,extended release 24 hr take 1 capsule by oral route 2 times every day   Vitamin D3 1,000 unit capsule take 1 daily         _________________________________________________________________________________________________  CARDIAC HISTORY  CAD:  1 NSTEMI [95% Proximal LAD, Resolute (ISELA) to the Proximal LAD.] - 4/8/2014      CHF/CM:  1 Mixed ischemic/tachycardic CM. [Nl TSH.] - 4/2014   2 Ischemic & tachycardic Cardiomyopathy [Echo (EF 0.45) - 06/17/2014, (prev EF 20-30%)] - 6/2014   3 Progressive Cardiomyopathy [Cardiac PET (EF .16) - 09/24/2018, No ischemia noted. EF improved but not normalized; no CAD cath 2/2019.] - 9/2018      ARRHYTHMIA:  1 A Fib [DCCV, Plan: amio started; Eliquis with Effient (change eliquis to asa if NSR after 30 days). ] - 4/8/2014   2 PAF - 8/2010   3 A Fib, recurrent; and 6/2014. [Had marked sinus bradycardia while on bblocker/dig.] - 5/2014   4 Sinus Bradycardia. - 6/2/2014   5 PPM (Devices (Dual Chamber PPM (Bernardino Door)) - 7/17/2014) - 7/17/2014   6 PAF [CVR; Eliquis restarted (plavix stopped). ] - 9/2015   7 A Fib [RFA] - 4/2016   8 A Fib [RFA] - 1/2017   9 Chronic A Fib [PPM to BiV device, then AV node ablation. Amiodarone stopped. ] - 10/2018      RISK FACTORS:  1 Diabetes [Diagnosed in 2014]   2 Hypertension   3 Dyslipidemia   4 Tobacco Use: No/never         CARDIOVASCULAR PROCEDURES  Procedure Date Results   Cardiac PET 09/24/2018 EF 0.16 (16%), physiologic apical thinning with no ischemia   Cath 02/22/2019 Minimal CAD   Cath 04/08/2014 95% Proximal LAD, Resolute (ISELA) to the Proximal LAD. DCCV 04/08/2014 Initial Rhythm A Fib, Final Rhythm Sinus   Devices 02/08/2019 Bi-Ventricular ICD (St. Don), 100% Afib.   BIV pacing >99% (prior AV node ablation)   Devices 07/17/2014 Dual Chamber PPM (St. [id number])   Echo 08/17/2019 EF 20-25%; mild-to-moderate MR. Moderate TR. Low normal RV function. At [facility]. Echo 02/06/2019 EF 0.30 (30%), Mild MR, Mild TR, Mild LVH, Aortic Sclerosis, LA Diam 3.9 cm, Est RVSP 40 mmHg, global hypokinesis, worse in the inferior wall & apex. Normal RV. Echo 10/08/2018 EF 0.15 (15%), Aortic Sclerosis, Mild MR, Est RVSP 23 mmHg, EF 15-20%; normal RV. At [facility]. Echo 11/28/2017 EF 0.45 (45%), Mild Global Hypo, Mild TR, Mild LVH, Mild MR, Est RVSP 41 mmHg, Normal RV. (probable trileaflet AoV). Echo 10/30/2015 EF 0.45 (45%), Mild LVH, LA Diam 4.1 cm, Est RVSP 35 mmHg, Normal RV. ?bicuspid AoV; (Prob trileaflet on previous ANGELITO). Echo 06/17/2014 EF 0.45 (45%), EF range 45%-50%, Mild LV dilatation. Mild LV systolic dysfunction. ? Bicuspid AoV but prob trileafet on previous ANGELITO. Echo 04/03/2014 EF 0.20 (20%), global HK with lateral sparing; no valve disease. EKG 07/17/2020 Atrial Fib, Paced Rhythm   Holter 06/09/2014 No Malignant Arrhythmias, Atrial Fib, No correlation with symptoms, (, [address]   Holter 04/30/2014 No Malignant Arrhythmias, Atrial Fib, No correlation with symptoms, \"light or heavy chest\" = afib in 60s. HR  (ave 63). ASx pauses (upto 2.8) while asleep. RFA 01/19/2017 Indication A Fib, Final Rhythm Sinus   RFA   Indication A Fib   Sleep Study   OSAS: Moderate   ANGELITO 04/08/2014 EF 0.35 (35%), No BRAYDON Clot, prob trileaflet AoV. Venous Duplex 04/16/2020 Left:  1. Post venous procedure duplex exam shows no evidence of thrombus in the saphenofemoral junction. 2. Left mid calf positive  5mm in diameter and 2.8s in reflux.     Right:  Right distal calf positive  4.1mm in diameter and .53s in reflux. Venous Duplex 03/27/2020 Post venous ablation duplex exam shows no evidence of heat induced thrombus in the right common femoral vein.    Venous Duplex 02/19/2020 Exam was conducted with patient in a high reverse-trendelenburg position. The (right/left/bilateral) great saphenous vein is incompetent (above/below the knee) (throughout the lower extremity) with retrograde flow >0.5seconds.        For other plans, see orders. Hospital problem list     Active Hospital Problems    Diagnosis Date Noted    Respiratory failure ([facility] [address] 2020        Subjective: Genette Section reports       Chest pain X none  consistent with:  Non-cardiac CP         Atypical CP     None now  On going  Anginal CP     Dyspnea X none  at rest  with exertion         improved  unchanged  worse              PND X none  overnight       Orthopnea X none  improved  unchanged  worse   Presyncope X none  improved  unchanged  worse     Ambulated in hallway without symptoms   Yes   Ambulated in room without symptoms  Yes   Objective:     Physical Exam:  Overall VSSAF;    Visit Vitals  BP (!) 138/96 (BP 1 Location: Left arm, BP Patient Position: Sitting)   Pulse 81   Temp 98 °F (36.7 °C)   Resp 18   Ht 5' 4\" (1.626 m)   Wt 129.2 kg (284 lb 13.4 oz)   SpO2 90%   BMI 48.89 kg/m²     Temp (24hrs), Av.9 °F (36.6 °C), Min:97.6 °F (36.4 °C), Max:98.2 °F (36.8 °C)    Patient Vitals for the past 8 hrs:   Pulse   20 0741 81   20 0357 80     Patient Vitals for the past 8 hrs:   Resp   20 0741 18   20 0357 18     Patient Vitals for the past 8 hrs:   BP   20 0741 (!) 138/96   20 0357 145/78       Intake/Output Summary (Last 24 hours) at 2020 1005  Last data filed at 2020 0959  Gross per 24 hour   Intake 360 ml   Output 3400 ml   Net -3040 ml     General Appearance: Well developed, well nourished, no acute distress. Obese. Ears/Nose/Mouth/Throat:   Normal MM; anicteric. JVP: WNL   Resp:   Lungs clear to auscultation bilaterally. Nl resp effort. Cardiovascular:  IRRR, S1, S2 normal, no new murmur. No gallop or rub. Abdomen:   Soft, non-tender, bowel sounds are present. Extremities: mild edema bilaterally. Skin:  Neuro: Warm and dry. A/O x3, grossly nonfocal       cath site intact w/o hematoma or new bruit; distal pulse unchanged  Yes   Data Review:     Telemetry independently reviewed  sinus x chronic afib x V paced  NSVT     ECG independently reviewed  NSR  afib  no significant changes  NSST-Tw chgs      no new ECG provided for review   Lab results reviewed as noted below. Current medications reviewed as noted below. No results for input(s): PH, PCO2, PO2 in the last 72 hours.   Recent Labs     08/10/20  0355 08/09/20  1140   TROIQ <0.05 <0.05     Recent Labs     08/11/20  0508 08/10/20  0355 08/09/20  1140    140 143   K 3.2* 3.3* 3.8   * 108 109*   CO2 29 30 31   BUN 20 18 16   CREA 1.30* 1.28* 1.36*   GFRAA 50* 50* 47*   * 233* 178*   CA 7.7* 7.6* 8.4*   ALB  --  2.9* 3.3*   WBC 7.3 5.8 8.3   HGB 10.9* 10.0* 11.3*   HCT 36.6 33.3* 39.0    168 193     Lab Results   Component Value Date/Time    Cholesterol, total 123 08/10/2020 03:55 AM    HDL Cholesterol 56 08/10/2020 03:55 AM    LDL, calculated 53 08/10/2020 03:55 AM    Triglyceride 70 08/10/2020 03:55 AM    CHOL/HDL Ratio 2.2 08/10/2020 03:55 AM     Recent Labs     08/10/20  0355 08/09/20  1140   * 176*   TP 6.4 7.0   ALB 2.9* 3.3*   GLOB 3.5 3.7     Recent Labs     08/09/20  1140   APTT 32.2*      No components found for: GLPOC    Current Facility-Administered Medications   Medication Dose Route Frequency    pregabalin (LYRICA) capsule 200 mg  200 mg Oral BID    acetaminophen (TYLENOL) tablet 650 mg  650 mg Oral Q6H PRN    ondansetron (ZOFRAN) injection 4 mg  4 mg IntraVENous Q4H PRN    nitroglycerin (NITROBID) 2 % ointment 1 Inch  1 Inch Topical Q6H PRN    insulin glargine (LANTUS) injection 10 Units  10 Units SubCUTAneous QHS    amLODIPine (NORVASC) tablet 2.5 mg  2.5 mg Oral DAILY    atorvastatin (LIPITOR) tablet 40 mg  40 mg Oral QHS    dabigatran etexilate (PRADAXA) capsule 150 mg  150 mg Oral BID    hydrALAZINE (APRESOLINE) tablet 25 mg  25 mg Oral BID    isosorbide dinitrate (ISORDIL) tablet 30 mg  30 mg Oral DAILY    metoprolol succinate (TOPROL-XL) XL tablet 100 mg  100 mg Oral DAILY    pantoprazole (PROTONIX) tablet 40 mg  40 mg Oral ACB    venlafaxine-SR (EFFEXOR-XR) capsule 150 mg  150 mg Oral BID WITH MEALS    sodium chloride (NS) flush 5-40 mL  5-40 mL IntraVENous Q8H    sodium chloride (NS) flush 5-40 mL  5-40 mL IntraVENous PRN    bumetanide (BUMEX) injection 1 mg  1 mg IntraVENous Q12H    aspirin chewable tablet 81 mg  81 mg Oral DAILY    insulin lispro (HUMALOG) injection   SubCUTAneous AC&HS    glucose chewable tablet 16 g  4 Tab Oral PRN    dextrose (D50W) injection syrg 12.5-25 g  12.5-25 g IntraVENous PRN    glucagon (GLUCAGEN) injection 1 mg  1 mg IntraMUSCular PRN        Alina Contreras MD

## 2020-08-11 NOTE — ROUTINE PROCESS
[address] END OF SHIFT REPORT Bedside shift change report GIVEN TO DIAN barnett. Report included the following information SBAR. SIGNIFICANT CHANGES DURING SHIFT:  Patient concerned about her lung function and lack of exercise endurance PTA. Concerned that she might have some abnormalities in Thyrod function CONCERNS TO ADDRESS WITH MD:  Thyroid function, lung function. [address] NURSING NOTE Admission Date 8/9/2020 Admission Diagnosis Respiratory failure ([address] [J96.90] Consults IP CONSULT TO CARDIOLOGY Cardiac Monitoring [] Yes [] No  
  
Purposeful Hourly Rounding [] Yes   
Ananya Score Total Score: 1 Ananya score 3 or > [] Bed Alarm [] Avasys [] 1:1 sitter [] Patient refused (Signed refusal form in chart) Preet Score Preet Score: 18 Preet score 14 or < [] PMT consult [] Wound Care consult  
 []  Specialty bed  [] Nutrition consult Influenza Vaccine Received Flu Vaccine for Current Season (usually Sept-March): Not Flu Season Oxygen needs? [] Room air Oxygen @  []1L    []2L    []3L   []4L    []5L   []6L via NC Chronic home O2 use? [] Yes [] No 
Perform O2 challenge test and document in progress note using smartphrase (.Homeoxygen) Last bowel movement Last Bowel Movement Date: 08/10/20 Urinary Catheter LDAs Peripheral IV 08/09/20 Right Antecubital (Active) Site Assessment Clean, dry, & intact 08/10/20 1443 Phlebitis Assessment 0 08/10/20 1443 Infiltration Assessment 0 08/10/20 1443 Dressing Status Clean, dry, & intact 08/10/20 1443 Dressing Type Transparent;Tape 08/10/20 1443 Hub Color/Line Status Pink;Capped 08/10/20 1443 Action Taken Open ports on tubing capped 08/10/20 1443 Alcohol Cap Used Yes 08/10/20 1443 Readmission Risk Assessment Tool Score High Risk   
      
 37 Total Score 3 Has Seen PCP in Last 6 Months (Yes=3, No=0)  
 4 IP Visits Last 12 Months (1-3=4, 4=9, >4=11) 5 Pt. Coverage (Medicare=5 , Medicaid, or Self-Pay=4) 25 Charlson Comorbidity Score (Age + Comorbid Conditions) Criteria that do not apply:  
 . Living with Significant Other. Assisted Living. LTAC. SNF. or  
Rehab Patient Length of Stay (>5 days = 3) Expected Length of Stay 4d 2h Actual Length of Stay 2

## 2020-08-12 VITALS
HEART RATE: 80 BPM | OXYGEN SATURATION: 96 % | SYSTOLIC BLOOD PRESSURE: 140 MMHG | BODY MASS INDEX: 48.18 KG/M2 | TEMPERATURE: 98.1 F | WEIGHT: 282.19 LBS | DIASTOLIC BLOOD PRESSURE: 82 MMHG | RESPIRATION RATE: 20 BRPM | HEIGHT: 64 IN

## 2020-08-12 LAB
ANION GAP SERPL CALC-SCNC: 6 MMOL/L (ref 5–15)
BUN SERPL-MCNC: 20 MG/DL (ref 6–20)
BUN/CREAT SERPL: 15 (ref 12–20)
CALCIUM SERPL-MCNC: 7.7 MG/DL (ref 8.5–10.1)
CHLORIDE SERPL-SCNC: 111 MMOL/L (ref 97–108)
CO2 SERPL-SCNC: 28 MMOL/L (ref 21–32)
CREAT SERPL-MCNC: 1.33 MG/DL (ref 0.55–1.02)
GLUCOSE BLD STRIP.AUTO-MCNC: 133 MG/DL (ref 65–100)
GLUCOSE BLD STRIP.AUTO-MCNC: 158 MG/DL (ref 65–100)
GLUCOSE SERPL-MCNC: 138 MG/DL (ref 65–100)
POTASSIUM SERPL-SCNC: 3.2 MMOL/L (ref 3.5–5.1)
SERVICE CMNT-IMP: ABNORMAL
SERVICE CMNT-IMP: ABNORMAL
SODIUM SERPL-SCNC: 145 MMOL/L (ref 136–145)

## 2020-08-12 PROCEDURE — 82962 GLUCOSE BLOOD TEST: CPT

## 2020-08-12 PROCEDURE — 74011000250 HC RX REV CODE- 250: Performed by: FAMILY MEDICINE

## 2020-08-12 PROCEDURE — 74011250637 HC RX REV CODE- 250/637: Performed by: INTERNAL MEDICINE

## 2020-08-12 PROCEDURE — 74011636637 HC RX REV CODE- 636/637: Performed by: INTERNAL MEDICINE

## 2020-08-12 PROCEDURE — 74011250637 HC RX REV CODE- 250/637: Performed by: FAMILY MEDICINE

## 2020-08-12 PROCEDURE — 80048 BASIC METABOLIC PNL TOTAL CA: CPT

## 2020-08-12 PROCEDURE — 36415 COLL VENOUS BLD VENIPUNCTURE: CPT

## 2020-08-12 RX ORDER — ISOSORBIDE MONONITRATE 60 MG/1
60 TABLET, EXTENDED RELEASE ORAL DAILY
Qty: 30 TAB | Refills: 1 | Status: SHIPPED | OUTPATIENT
Start: 2020-08-12 | End: 2020-09-29 | Stop reason: SDUPTHER

## 2020-08-12 RX ORDER — BUMETANIDE 2 MG/1
2 TABLET ORAL DAILY
Qty: 90 TAB | Refills: 1 | Status: SHIPPED | OUTPATIENT
Start: 2020-08-12 | End: 2020-12-24

## 2020-08-12 RX ORDER — LISINOPRIL 40 MG/1
20 TABLET ORAL DAILY
Qty: 30 TAB | Refills: 1 | Status: SHIPPED
Start: 2020-08-12 | End: 2021-01-07

## 2020-08-12 RX ORDER — LISINOPRIL 20 MG/1
40 TABLET ORAL DAILY
Status: DISCONTINUED | OUTPATIENT
Start: 2020-08-12 | End: 2020-08-12 | Stop reason: HOSPADM

## 2020-08-12 RX ORDER — POTASSIUM CHLORIDE 750 MG/1
10 TABLET, FILM COATED, EXTENDED RELEASE ORAL 3 TIMES DAILY
Status: DISCONTINUED | OUTPATIENT
Start: 2020-08-12 | End: 2020-08-12 | Stop reason: HOSPADM

## 2020-08-12 RX ORDER — POTASSIUM CHLORIDE 750 MG/1
40 TABLET, FILM COATED, EXTENDED RELEASE ORAL ONCE
Status: COMPLETED | OUTPATIENT
Start: 2020-08-12 | End: 2020-08-12

## 2020-08-12 RX ADMIN — METOPROLOL SUCCINATE 100 MG: 50 TABLET, EXTENDED RELEASE ORAL at 08:57

## 2020-08-12 RX ADMIN — PREGABALIN 200 MG: 100 CAPSULE ORAL at 08:57

## 2020-08-12 RX ADMIN — DABIGATRAN ETEXILATE MESYLATE 150 MG: 150 CAPSULE ORAL at 08:55

## 2020-08-12 RX ADMIN — HYDRALAZINE HYDROCHLORIDE 25 MG: 25 TABLET, FILM COATED ORAL at 08:55

## 2020-08-12 RX ADMIN — Medication 10 ML: at 05:24

## 2020-08-12 RX ADMIN — VENLAFAXINE HYDROCHLORIDE 150 MG: 150 CAPSULE, EXTENDED RELEASE ORAL at 08:58

## 2020-08-12 RX ADMIN — AMLODIPINE BESYLATE 10 MG: 5 TABLET ORAL at 08:54

## 2020-08-12 RX ADMIN — POTASSIUM CHLORIDE 10 MEQ: 750 TABLET, FILM COATED, EXTENDED RELEASE ORAL at 12:13

## 2020-08-12 RX ADMIN — ISOSORBIDE MONONITRATE 60 MG: 30 TABLET, EXTENDED RELEASE ORAL at 08:56

## 2020-08-12 RX ADMIN — INSULIN LISPRO 2 UNITS: 100 INJECTION, SOLUTION INTRAVENOUS; SUBCUTANEOUS at 08:55

## 2020-08-12 RX ADMIN — POTASSIUM CHLORIDE 40 MEQ: 750 TABLET, FILM COATED, EXTENDED RELEASE ORAL at 08:57

## 2020-08-12 RX ADMIN — BUMETANIDE 1 MG: 0.25 INJECTION INTRAMUSCULAR; INTRAVENOUS at 09:29

## 2020-08-12 RX ADMIN — PANTOPRAZOLE SODIUM 40 MG: 40 TABLET, DELAYED RELEASE ORAL at 08:57

## 2020-08-12 RX ADMIN — LISINOPRIL 40 MG: 20 TABLET ORAL at 09:29

## 2020-08-12 RX ADMIN — ASPIRIN 81 MG CHEWABLE TABLET 81 MG: 81 TABLET CHEWABLE at 08:54

## 2020-08-12 NOTE — DISCHARGE INSTRUCTIONS
HOSPITALIST DISCHARGE INSTRUCTIONS    NAME: Dax Ha   :  1950   MRN:  [id number]     Date/Time:  2020 8:01 AM    ADMIT DATE: 2020   DISCHARGE DATE: 2020     Acute Hypoxic Respiratory Failure POA   Acute on chronic systolic heart failure with EF 21-25%  CKD3  Diabetes mellitus type 2  Hyperlipidemia  Paroxysmal A.fib  H/o PPM placement  HTN    · It is important that you take the medication exactly as they are prescribed. · Keep your medication in the bottles provided by the pharmacist and keep a list of the medication names, dosages, and times to be taken in your wallet. · Do not take other medications without consulting your doctor. What to do at [address]:  Cardiac Diet    Recommended activity: Activity as tolerated      If you have questions regarding the hospital related prescriptions or hospital related issues please call [facility] at [phone]. You can always direct your questions to your primary care doctor if you are unable to reach your hospital physician; your PCP works as an extension of your hospital doctor just like your hospital doctor is an extension of your PCP for your time at the hospital [facility])    If you experience any of the following symptoms then please call your primary care physician or return to the emergency room if you cannot get hold of your doctor:    Fever, chills, nausea, vomiting, or persistent diarrhea  Worsening weakness or new problems with your speech or balance  Dark stools or visible blood in your stools  New Leg swelling or shortness of breath as these could be signs of a clot    Additional Instructions:      Bring these papers with you to your follow up appointments. The papers will help your doctors be sure to continue the care plan from the hospital.              Information obtained by :  I understand that if any problems occur once I am at home I am to contact my physician.     I understand and acknowledge receipt of the instructions indicated above.                                                                                                                                            Physician's or R.N.'s Signature                                                                  Date/Time                                                                                                                                              Patient or Representative Signature

## 2020-08-12 NOTE — DISCHARGE SUMMARY
Hospitalist Discharge Summary     Patient ID:  Arie Zafar  [id number]  88 y.o.  1950 8/9/2020    PCP on record: Lloyd Pham MD    Admit date: 8/9/2020  Discharge date and time: 8/12/2020    DISCHARGE DIAGNOSIS:    Acute Hypoxic Respiratory Failure POA   Acute on chronic systolic heart failure with EF 21-25%  CKD3  Diabetes mellitus type 2  Hyperlipidemia  Paroxysmal A.fib  H/o PPM placement  HTN      CONSULTATIONS:  IP CONSULT TO CARDIOLOGY    Excerpted HPI from H&P of Miracle Zaragoza MD:  Arie Zafar is a 81 y.o. female who presents with with SOB.     History was obtained from the patient, although patient is a poor historian and currently on BiPAP     Patient started experiencing some shortness of breath that started 2 to 3 days back. Started experiencing increased lower extremity edema, trouble laying down, nausea and poor appetite. Today the shortness of breath got worse and patient decided to come to the hospital.  Patient was found to be in acute respiratory distress and was put on BiPAP in the ER and was requested to be admitted under the hospitalist service. Patient denies any fever or chills associated with her symptoms. Patient reports that her legs have been swelling up and she feels as if they are holding fluid.      The patient denies any Headache, blurry vision, sore throat, trouble swallowing, trouble with speech, chest pain,cough, fever, chills, N/V/D, abd pain, urinary symptoms, constipation, recent travels, sick contacts, focal or generalized oneirological symptoms,  falls, injuries, rashes, contact with COVID-19 diagnosed patients, hematemesis, melena, hemoptysis, hematuria, rashes, denies starting any new medications and denies any other concerns or problems besides as mentioned above.     ______________________________________________________________________  DISCHARGE SUMMARY/HOSPITAL COURSE:  for full details see H&P, daily progress notes, labs, consult notes. Acute Hypoxic Respiratory Failure POA   Due to Acute on chronic systolic heart failure with EF 21-25%  CKD3  Patient was diuresed well with the Bumex IV, will switch to p.o. Bumex  Monitor I/Os, daily weight and lytes  Cardiology consult  Looks volume overloaded  Pt  weaned down to room air       Diabetes mellitus type 2  Poorly controlled  HbA1C 8.4    SSII     Hyperlipidemia: Continue home medication     Paroxysmal A.fib  H/o PPM placement  HTN  Continue Pradaxa   Continue Metoprolol, Norvasc hydralazine and isordil    _______________________________________________________________________  Patient seen and examined by me on discharge day. Pertinent Findings:  Gen:    Not in distress  Chest: Clear lungs  CVS:   Regular rhythm. 1+ pitting edema bilaterally  Abd:  Soft, not distended, not tender  Neuro:  Alert, oriented  _______________________________________________________________________  DISCHARGE MEDICATIONS:   Current Discharge Medication List      START taking these medications    Details   isosorbide mononitrate ER (IMDUR) 60 mg CR tablet Take 1 Tab by mouth daily. Qty: 30 Tab, Refills: 1         CONTINUE these medications which have CHANGED    Details   bumetanide (BUMEX) 2 mg tablet Take 1 Tab by mouth daily. Qty: 90 Tab, Refills: 1      lisinopriL (PRINIVIL, ZESTRIL) 40 mg tablet Take 0.5 Tabs by mouth daily. Qty: 30 Tab, Refills: 1         CONTINUE these medications which have NOT CHANGED    Details   magnesium oxide (MAG-OX) 400 mg tablet Take 1 tablet by mouth twice daily  Qty: 60 Tab, Refills: 3      cyclobenzaprine (FLEXERIL) 10 mg tablet Take 1 Tab by mouth three (3) times daily as needed for Muscle Spasm(s).   Qty: 30 Tab, Refills: 0      glimepiride (AMARYL) 2 mg tablet Take 1 tablet by mouth twice daily  Qty: 60 Tab, Refills: 3      omeprazole (PRILOSEC) 40 mg capsule TAKE 1 CAPSULE EVERY DAY  Qty: 90 Cap, Refills: 3      clonazePAM (KlonoPIN) 0.5 mg tablet TAKE 1 TABLET BY MOUTH ONCE DAILY AT NIGHT MAX  DAILY  DOSE  OF  0.5MG  Qty: 90 Tab, Refills: 0    Associated Diagnoses: Anxiety      potassium chloride SR (KLOR-CON 10) 10 mEq tablet Take 1 Tab by mouth three (3) times daily. Qty: 540 Tab, Refills: 3      hydrALAZINE (APRESOLINE) 25 mg tablet Take 25 mg by mouth two (2) times a day. calcium carb/vit D2/minerals (CALTRATE PLUS PO) Take 500 mg by mouth daily. SYMBICORT 160-4.5 mcg/actuation HFAA INHALE 2 PUFFS BY MOUTH TWICE DAILY  Qty: 1 Inhaler, Refills: 3    Comments: Please consider 90 day supplies to promote better adherence  Associated Diagnoses: Cough      albuterol (PROVENTIL HFA, VENTOLIN HFA, PROAIR HFA) 90 mcg/actuation inhaler Take 1 Puff by inhalation every four (4) hours as needed for Wheezing. Qty: 1 Inhaler, Refills: 6      diphenhydrAMINE (BENADRYL) 25 mg capsule Take 25 mg by mouth two (2) times a day. nystatin (MYCOSTATIN) topical cream Apply  to affected area two (2) times daily as needed for Skin Irritation (Rash). dabigatran etexilate (PRADAXA) 150 mg capsule Take 1 Cap by mouth two (2) times a day. IF NO BLEEDING AT CATH SITE. Qty: 60 Cap, Refills: 12      amLODIPine (NORVASC) 2.5 mg tablet Take 1 Tab by mouth daily. Qty: 90 Tab, Refills: 3      metoprolol succinate (TOPROL-XL) 100 mg tablet Take 1 Tab by mouth daily. Qty: 30 Tab, Refills: prn      acetaminophen (TYLENOL EXTRA STRENGTH) 500 mg tablet Take 1,000 mg by mouth every eight (8) hours as needed for Pain (Headache). lidocaine (ASPERCREME, LIDOCAINE,) 4 % topical cream Apply  to affected area daily as needed for Pain (Knee pain). sodium chloride (AYR SALINE) 0.65 % nasal squeeze bottle 2 Sprays by Both Nostrils route every eight (8) hours as needed. conjugated estrogens (PREMARIN) 0.625 mg/gram vaginal cream Insert 0.5 g into vagina two (2) times a week.  Tuesdays and Thursdays       venlafaxine-SR (EFFEXOR XR) 150 mg capsule Take 150 mg by mouth two (2) times daily (with meals). pregabalin (LYRICA) 200 mg capsule Take 300 mg by mouth two (2) times a day. cholecalciferol, vitamin d3, (VITAMIN D3) 400 unit cap Take 400 Units by mouth daily. aspirin 81 mg chewable tablet Take 81 mg by mouth daily. Associated Diagnoses: Unspecified hereditary and idiopathic peripheral neuropathy; Essential and other specified forms of tremor; Migraine with aura, without mention of intractable migraine without mention of status migrainosus; Type II or unspecified type diabetes mellitus with neurological manifestations, not stated as uncontrolled(250.60) ([address] B12 deficiency; Ataxia; Spinal stenosis, lumbar region, without neurogenic claudication; HBP (high blood pressure); Polyneuropathy in diabetes(357.2); Type II or unspecified type diabetes mellitus with neurological manifestations, not stated as uncontrolled(250.60) ([address] Polyneuropathy in diabetes(357.2)      atorvastatin (LIPITOR) 40 mg tablet Take 1 Tab by mouth nightly. Qty: 30 Tab, Refills: 12      mupirocin calcium (BACTROBAN) 2 % topical cream Apply  to affected area two (2) times a day. Qty: 15 g, Refills: 0      clotrimazole-betamethasone (LOTRISONE) topical cream APPLY TO THE AFFECTED AREA 2 TIMES DAILY  Qty: 15 g, Refills: 0      traMADol (ULTRAM) 50 mg tablet Take 50 mg by mouth daily as needed for Pain (Used to supplement the Lyrica when lyrica doesnt manage the pain on its own). STOP taking these medications       isosorbide dinitrate (ISORDIL) 30 mg tablet Comments:   Reason for Stopping:         triamcinolone acetonide (KENALOG) 0.1 % topical cream Comments:   Reason for Stopping:                 Patient Follow Up Instructions:    Activity: Activity as tolerated  Diet: Cardiac Diet  Wound Care: None needed      Follow-up Information     Follow up With Specialties Details Why Contact Info    Katie Chacon MD Internal Medicine In 2 weeks  [address]  [address] [place] [address] Drive      Trev Lora MD Cardiology Schedule an appointment as soon as possible for a visit in 1 week  [id number] [facility] ([phone]) will be calling you in the next 1-2 days to see how you are doing and to set up an office visit with Dr Albert Quintana within the next 7-10 days from now         ________________________________________________________________    Risk of deterioration: Low    Condition at Discharge:  Stable  __________________________________________________________________    Disposition  Home with family, no needs    ____________________________________________________________________    Code Status: Full Code  ___________________________________________________________________      Total time in minutes spent coordinating this discharge (includes going over instructions, follow-up, prescriptions, and preparing report for sign off to her PCP) :  47 minutes    Signed:  Gaviota Preston MD

## 2020-08-12 NOTE — PROGRESS NOTES
VISHNU:  -MD//cardiology anticipate d/c today. All in agreement  -d/c plan discussed with pt/spouse.   Spouse will transport ~10am Wednesday  -she declines [facility]  -no 2nd IM letter needed as her admission date is 8/09

## 2020-08-12 NOTE — PROGRESS NOTES
Cardiology Progress Note  8/12/2020     Admit Date: 8/9/2020  Admit Diagnosis: Respiratory failure ([facility] [J96.90]  CC: none currently  Cardiologist:  Dr Little/Obie. Cardiac Assessment/Plan:    1) CAD (Prox LAD ISELA 4/2014 for NQWMI), Neg PET for ischemia 9/2018. Min CAD 2/2019.  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 Achilles November was too expensive. *EF 15-20% 9/2018. EF 30% w/ minimal CAD at cath 2/2019. EF 20-25% (m-mod MR; mod TR) 8/2019.  3) HTN,   4) AFIB: PAfib (RFA 4/2016 and 1/2017), Recurrent/persistent afib 2017/2018: AV node ablation 10/2018. *Chronic AC w/ pradaxa. 5) DM,   6) SHAYLA (on CPAP),   7) CKD (Cr 1.5 range). Aldactone stopped in 2014. Cr 2 11/2018 (after becky): Cr 1.5 12/2018 & 8/2019; She has reconnected with Dr. Mark Mcmahon. 8) St Don PPM 7/2014 for bradycardia while on therapy for AFib: changed to BiV ICD 10/2018.  9) nephrolithiasis  10) cholecystectomy 11/2018.  11) LE venous insufficiency:  B SFV ablation 3/2020. Continues to use compression stockings/wraps. 12) Obesity: 240# 2014; 262# 9/2019. 270 to 281# 2020.     Admitted with resp failure/CHF. +salty diet; Compliant w CPAP & meds. Rec 8/10: agree w/ diuresis; Needs better BP control.      8/11: BPs 130-170; HR 80s; 90-93% (RA). Neg 5.5L  Hg 10.9; Cr 1.3; K 3.2; Nl TSH. Cardiac meds: norvasc 2.5; asa81; lipitor 40; Bumex 1 IV bid; pradaxa bid; hydralazine 25 bid; imdur 30; toprol  qday;     Rec: Check sats with ambulation; Imdur to previous 60 qday; norvasc to 10. Dispo per primary team: ?d/c tomorrow. 8/11: BPs 130-150; HR 80s; 95-97% (RA). Net neg 7.5L  Cr 1.33; K 3.2;  Cardiac meds: norvasc 10; asa81; lipitor 40; Bumex 1 IV bid; pradaxa bid; hydralazine 25 bid; imdur 60; toprol  qday;     Rec: Restart lisinopril 40; Bumex to 2 mg PO qday on discharge;  Restart KCL 10 tid  Dispo per primary team: ok for d/c from cardiac standpoint if sats ok with ambulation (d/w nursing; ordered yesterday but not done apparently). Hospital Problem List:  Active Problems:   Respiratory failure ([facility] [address] (8/9/2020)   ____________________________________________________________________  Dax Ha is a 81 y.o. female presents with Respiratory failure ([facility] [address] [J96.90].    As noted in H&P: \"69 y.o. female who presents with with SOB.     History was obtained from the patient, although patient is a poor historian and currently on BiPAP     Patient started experiencing some shortness of breath that started 2 to 3 days back.  Started experiencing increased lower extremity edema, trouble laying down, nausea and poor appetite.  Today the shortness of breath got worse and patient decided to come to the hospital. Wallace Polanco was found to be in acute respiratory distress and was put on BiPAP in the ER and was requested to be admitted under the hospitalist service.  Patient denies any fever or chills associated with her symptoms.  Patient reports that her legs have been swelling up and she feels as if they are holding fluid.     The patient denies any Headache, blurry vision, sore throat, trouble swallowing, trouble with speech, chest pain,cough, fever, chills, N/V/D, abd pain, urinary symptoms, constipation, recent travels, sick contacts, focal or generalized oneirological symptoms,  falls, injuries, rashes, contact with COVID-19 diagnosed patients, hematemesis, melena, hemoptysis, hematuria, rashes, denies starting any new medications and denies any other concerns or problems besides as mentioned above. \"     ______________________________________________________________________     The patient reports no angina; few min resting CP yesterday: gone spont. Worse STEEN PTA, improved now; No change in LE edema. No PND, orthopnea, palpitations, pre-syncope, syncope. No current complaints.     ECG: afib; vpaced. Tele: afib; vpaced. CXR: \"Questionable diffuse interstitial prominence. \"  Chest CT w/o contrast: \"1. Small bilateral pleural effusions. 2. Diffuse ill-defined pulmonary groundglass opacification. Findings nonspecific  and should be correlated clinically. 3. Patchy bibasilar atelectasis versus pneumonia. \"     Notable labs: Hg 10; K 3.3 from 3.8; Cr 1.28 from 1.36 (prev 1.5/GFR 35). LDL 53.  _____________________________________________________________________  Notable prior cardiac history:  @ OV 7/17/20:  Ms Karthik Reagan has a h/o:  1) CAD (Prox LAD ISELA 4/2014 for NQWMI), Neg PET for ischemia 9/2018. Min CAD 2/2019.  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 Wynema Mt was too expensive. *EF 15-20% 9/2018. EF 30% w/ minimal CAD at cath 2/2019. EF 20-25% (m-mod MR; mod TR) 8/2019.  3) HTN,   4) AFIB: PAfib (RFA 4/2016 and 1/2017), Recurrent/persistent afib 2017/2018: AV node ablation 10/2018. *Chronic AC w/ pradaxa. 5) DM,   6) SHAYLA (on CPAP),   7) CKD (Cr 1.5 range). Aldactone stopped in 2014. Cr 2 11/2018 (after becky): Cr 1.5 12/2018 & 8/2019; She has reconnected with Dr. Ariel Aguirre. 8) St Don PPM 7/2014 for bradycardia while on therapy for AFib: changed to BiV ICD 10/2018.  9) nephrolithiasis  10) cholecystectomy 11/2018.  11) LE venous insufficiency:  B SFV ablation 3/2020. Continues to use compression stockings/wraps. 12) Obesity: 240# 2014; 262# 9/2019. 270 to 281# 2020.     11/2019: Chronic STEEN and fatigue are unchanged but bothersome. Now having some upper back pain with it and thinks that is due to AF. States discomfort worsens with exertion and improves with rest. No syncope. BLE edema unchanged. Normally wears wraps/stockings.     1/2020:  Continued LE edema & wears wraps/stockings for this. No chest pain. Decreasing dyspnea with increasing exercise tolerance.     7/2020:  No chest pain nor change in dyspnea. Using CPAP. Had palpitations x1 since last visit. No falling or bleeding.     IMPRESSION AND PLAN  01.  Atherosclerotic heart disease of native coronary artery without angina pectoris (I25.10): Minimal CAD @ cath 2/2019.     We discussed the signs and symptoms of unstable angina, myocardial infarction and malignant arrhythmia. The patient knows to seek immediate medical attention should they occur. 02. Dilated cardiomyopathy (I42.0):  Mixed ischemic/non-ischemic (tachycardia mediated) CM previously. Her EF improved but then has worsened again as noted above. EF 30% 2/2019. The patient has an ICD in place. 03. Chronic combined systolic (congestive) and diastolic (congestive) heart failure (I50.42): The patient is compliant with their CHF regimen. is tolerating the current beta-blocker dose. 04. Longstanding persistent atrial fibrillation (I48.11): As per Dr. Jaison Ramirez, off amiodarone & now s/p AV node ablation. She remains on anticoagulation. ECG done   05. Hyp hrt & chr kdny dis w hrt fail and stg 1-4/unsp chr kdny (I13.0):  Finally controlled with addition of Imdur. I have made no changes to the present regimen. 06. Chronic venous insufficiency (I87.2): As noted by Dr Jeffery Vargas: Gemma Verdin has bilateral lower extremity edema also has signs of chronic venous stasis with discoloration of anterior aspect of legs. She also has congestive heart failure and chronic kidney disease. Her etiology for leg swelling is multifactorial.  She has undergone bilateral GSV RF ablation. She has not seen much improvement yet. I have advised her to continue to wear the compression stocking and do the leg elevations for next 6 months. She cannot exercise due to  shortness of breath. I will see her back in 6 months. \"   07. Mixed hyperlipidemia (E78.2):  Lipid labs drawn by PCP. The patient is tolerating lipid lowering therapy well. 08. Chronic kidney disease, stage 3 (moderate) (N18.3):  Cr 1.24/GFR46 11/2015. Cr 1.32/GFR 43 1/2017. Cr 2 11/2018 after becky. Creatinine 1.5 12/2018. 09. Localized edema (R60.0):  Chronic. She avoids salt. This condition is stable. 11. Presence of automatic (implantable) cardiac defibrillator (Z95.810): This is a biventricular device. will continue to be followed in device clinic. 11. Type 2 diabetes mellitus with other specified complication (U17.22):  Lipids & HTN as noted above; DM managed by other MD.   12. Long term (current) use of aspirin (Z79.82): This condition is stable. 15. Long term (current) use of anticoagulants (Z79.01): This condition is stable. Indicated for atrial fibrillation. No bleeding. If she has recurrent falls, she knows to call for re-evaluation of anticoagulation. 14. Body mass index (BMI) 40.0-44.9, adult (Z68.41): Worsened since last seen. The patient was instructed on AHA diet and regular exercise.      ORDERS:  1 Dietary management education, guidance, and counseling   2 ECG done   3 Return office visit with Aaron Little MD in 1 Year.    4 The patient was instructed on AHA diet and regular exercise.         7/17/20 MEDICATION LIST  Medication Sig Desc   albuterol sulfate HFA 90 mcg/actuation aerosol inhaler inhale 2 puff by inhalation route  every 4 - 6 hours as needed   amlodipine 10 mg tablet take 1 tablet by oral route  every day   aspirin 81 mg tablet,delayed release take 1 tablet by oral route  every day   atorvastatin 40 mg tablet take 1 tablet by oral route  every evening   Ayr Saline 0.65 % nasal spray aerosol     bumetanide 2 mg tablet take 1 tablet by oral route  every day   clonazepam 0.5 mg tablet take 1 tablet by oral route  every day   glimepiride 4 mg tablet take 1 tablet by oral twice a day   hydralazine 25 mg tablet take 1 tablet by oral route 2 times every day with food   isosorbide mononitrate ER 30 mg tablet,extended release 24 hr take 1 tablet by oral route  twice a day   lidocaine 4 % topical cream     lisinopril 40 mg tablet take 1 tablet by oral route  every day   Lyrica 200 mg capsule take 1 capsule by oral route 2 times every day   metoprolol succinate  mg tablet,extended release 24 hr TAKE 1 TABLET EVERY DAY   omeprazole 40 mg capsule,delayed release take 1 capsule by oral route  every day before a meal   potassium chloride ER 10 mEq capsule,extended release take 1 Capsule by oral route 3 times daily   Pradaxa 150 mg capsule take 1 capsule by oral route 2 times every day   Premarin 0.625 mg/gram vaginal cream insert 0.5 applicatorful by vaginal route  every day cyclically, 3 weeks on and 1 week off   Symbicort 160 mcg-4.5 mcg/actuation HFA aerosol inhaler inhale 2 puff by inhalation route 2 times every day in the morning and evening   venlafaxine  mg capsule,extended release 24 hr take 1 capsule by oral route 2 times every day   Vitamin D3 1,000 unit capsule take 1 daily         _________________________________________________________________________________________________  CARDIAC HISTORY  CAD:  1 NSTEMI [95% Proximal LAD, Resolute (ISELA) to the Proximal LAD.] - 4/8/2014      CHF/CM:  1 Mixed ischemic/tachycardic CM. [Nl TSH.] - 4/2014   2 Ischemic & tachycardic Cardiomyopathy [Echo (EF 0.45) - 06/17/2014, (prev EF 20-30%)] - 6/2014   3 Progressive Cardiomyopathy [Cardiac PET (EF .16) - 09/24/2018, No ischemia noted. EF improved but not normalized; no CAD cath 2/2019.] - 9/2018      ARRHYTHMIA:  1 A Fib [DCCV, Plan: amio started; Eliquis with Effient (change eliquis to asa if NSR after 30 days). ] - 4/8/2014   2 PAF - 8/2010   3 A Fib, recurrent; and 6/2014. [Had marked sinus bradycardia while on bblocker/dig.] - 5/2014   4 Sinus Bradycardia. - 6/2/2014   5 PPM (Devices (Dual Chamber PPM ([facility])) - 7/17/2014) - 7/17/2014   6 PAF [CVR; Eliquis restarted (plavix stopped). ] - 9/2015   7 A Fib [RFA] - 4/2016   8 A Fib [RFA] - 1/2017   9 Chronic A Fib [PPM to BiV device, then AV node ablation. Amiodarone stopped. ] - 10/2018      RISK FACTORS:  1 Diabetes [Diagnosed in 2014]   2 Hypertension   3 Dyslipidemia   4 Tobacco Use: No/never       CARDIOVASCULAR PROCEDURES  Procedure Date Results   Cardiac PET 09/24/2018 EF 0.16 (16%), physiologic apical thinning with no ischemia   Cath 02/22/2019 Minimal CAD   Cath 04/08/2014 95% Proximal LAD, Resolute (ISELA) to the Proximal LAD. DCCV 04/08/2014 Initial Rhythm A Fib, Final Rhythm Sinus   Devices 02/08/2019 Bi-Ventricular ICD ([facility] Don), 100% Afib. BIV pacing >99% (prior AV node ablation)   Devices 07/17/2014 Dual Chamber PPM (St. [id number])   Echo 08/17/2019 EF 20-25%; mild-to-moderate MR. Moderate TR. Low normal RV function. [facility] [address] [facility] Echo 02/06/2019 EF 0.30 (30%), Mild MR, Mild TR, Mild LVH, Aortic Sclerosis, LA Diam 3.9 cm, Est RVSP 40 mmHg, global hypokinesis, worse in the inferior wall & apex. Normal RV. Echo 10/08/2018 EF 0.15 (15%), Aortic Sclerosis, Mild MR, Est RVSP 23 mmHg, EF 15-20%; normal RV. At [address] Echo 11/28/2017 EF 0.45 (45%), Mild Global Hypo, Mild TR, Mild LVH, Mild MR, Est RVSP 41 mmHg, Normal RV. (probable trileaflet AoV). Echo 10/30/2015 EF 0.45 (45%), Mild LVH, LA Diam 4.1 cm, Est RVSP 35 mmHg, Normal RV. ?bicuspid AoV; (Prob trileaflet on previous ANGELITO). Echo 06/17/2014 EF 0.45 (45%), EF range 45%-50%, Mild LV dilatation. Mild LV systolic dysfunction. ? Bicuspid AoV but prob trileafet on previous ANGELITO. Echo 04/03/2014 EF 0.20 (20%), global HK with lateral sparing; no valve disease. EKG 07/17/2020 Atrial Fib, Paced Rhythm   Holter 06/09/2014 No Malignant Arrhythmias, Atrial Fib, No correlation with symptoms, (, [address]   Holter 04/30/2014 No Malignant Arrhythmias, Atrial Fib, No correlation with symptoms, \"light or heavy chest\" = afib in 60s. HR  (ave 63). ASx pauses (upto 2.8) while asleep. RFA 01/19/2017 Indication A Fib, Final Rhythm Sinus   RFA   Indication A Fib   Sleep Study   OSAS: Moderate   ANGELITO 04/08/2014 EF 0.35 (35%), No BRAYDON Clot, prob trileaflet AoV. Venous Duplex 04/16/2020 Left:  1.  Post venous procedure duplex exam shows no evidence of thrombus in the saphenofemoral junction. 2. Left mid calf positive  5mm in diameter and 2.8s in reflux.     Right:  Right distal calf positive  4.1mm in diameter and .53s in reflux. Venous Duplex 2020 Post venous ablation duplex exam shows no evidence of heat induced thrombus in the right common femoral vein. Venous Duplex 2020 Exam was conducted with patient in a high reverse-trendelenburg position. The (right/left/bilateral) great saphenous vein is incompetent (above/below the knee) (throughout the lower extremity) with retrograde flow >0.5seconds.        For other plans, see orders.   Hospital problem list     Active Hospital Problems    Diagnosis Date Noted    [facility]        Subjective: Ginger Lopez reports       Chest pain X none  consistent with:  Non-cardiac CP         Atypical CP     None now  On going  Anginal CP     Dyspnea X none  at rest  with exertion         improved  unchanged  worse              PND X none  overnight       Orthopnea X none  improved  unchanged  worse   Presyncope X none  improved  unchanged  worse     Ambulated in hallway without symptoms   Yes   Ambulated in room without symptoms  Yes   Objective:     Physical Exam:  Overall VSSAF;    Visit Vitals  BP (!) 134/111 (BP 1 Location: Left arm, BP Patient Position: Sitting)   Pulse 80   Temp 98.1 °F (36.7 °C)   Resp 20   Ht 5' 4\" (1.626 m)   Wt 128 kg (282 lb 3 oz)   SpO2 95%   BMI 48.44 kg/m²     Temp (24hrs), Av.7 °F (36.5 °C), Min:97.4 °F (36.3 °C), Max:98.1 °F (36.7 °C)    Patient Vitals for the past 8 hrs:   Pulse   20 0811 80   20 0313 80     Patient Vitals for the past 8 hrs:   Resp   20 0811 20   20 0313 25     Patient Vitals for the past 8 hrs:   BP   20 0811 (!) 134/111   20 0313 138/80       Intake/Output Summary (Last 24 hours) at 2020 0819  Last data filed at 2020 0654  Gross per 24 hour   Intake 720 ml   Output 3100 ml   Net -2380 ml     General Appearance: Well developed, well nourished, no acute distress. Obese. Ears/Nose/Mouth/Throat:   Normal MM; anicteric. JVP: WNL   Resp:   Lungs clear to auscultation bilaterally. Nl resp effort. Cardiovascular:  IRRR, S1, S2 normal, no new murmur. No gallop or rub. Abdomen:   Soft, non-tender, bowel sounds are present. Extremities: Minimal edema bilaterally. Skin:  Neuro: Warm and dry. A/O x3, grossly nonfocal       cath site intact w/o hematoma or new bruit; distal pulse unchanged  Yes   Data Review:     Telemetry independently reviewed  sinus x chronic afib x V paced  NSVT     ECG independently reviewed  NSR  afib  no significant changes  NSST-Tw chgs      no new ECG provided for review   Lab results reviewed as noted below. Current medications reviewed as noted below. No results for input(s): PH, PCO2, PO2 in the last 72 hours.   Recent Labs     08/10/20  0355 08/09/20  1140   TROIQ <0.05 <0.05     Recent Labs     08/12/20  0530 08/11/20  0508 08/10/20  0355 08/09/20  1140    143 140 143   K 3.2* 3.2* 3.3* 3.8   * 110* 108 109*   CO2 28 29 30 31   BUN 20 20 18 16   CREA 1.33* 1.30* 1.28* 1.36*   GFRAA 48* 50* 50* 47*   * 128* 233* 178*   CA 7.7* 7.7* 7.6* 8.4*   ALB  --   --  2.9* 3.3*   WBC  --  7.3 5.8 8.3   HGB  --  10.9* 10.0* 11.3*   HCT  --  36.6 33.3* 39.0   PLT  --  184 168 193     Lab Results   Component Value Date/Time    Cholesterol, total 123 08/10/2020 03:55 AM    HDL Cholesterol 56 08/10/2020 03:55 AM    LDL, calculated 53 08/10/2020 03:55 AM    Triglyceride 70 08/10/2020 03:55 AM    CHOL/HDL Ratio 2.2 08/10/2020 03:55 AM     Recent Labs     08/10/20  0355 08/09/20  1140   * 176*   TP 6.4 7.0   ALB 2.9* 3.3*   GLOB 3.5 3.7     Recent Labs     08/09/20  1140   APTT 32.2*      No components found for: GLPOC    Current Facility-Administered Medications   Medication Dose Route Frequency    potassium chloride SR (KLOR-CON 10) tablet 40 mEq  40 mEq Oral ONCE    amLODIPine (NORVASC) tablet 10 mg  10 mg Oral DAILY    isosorbide mononitrate ER (IMDUR) tablet 60 mg  60 mg Oral DAILY    pregabalin (LYRICA) capsule 200 mg  200 mg Oral BID    acetaminophen (TYLENOL) tablet 650 mg  650 mg Oral Q6H PRN    ondansetron (ZOFRAN) injection 4 mg  4 mg IntraVENous Q4H PRN    nitroglycerin (NITROBID) 2 % ointment 1 Inch  1 Inch Topical Q6H PRN    insulin glargine (LANTUS) injection 10 Units  10 Units SubCUTAneous QHS    atorvastatin (LIPITOR) tablet 40 mg  40 mg Oral QHS    dabigatran etexilate (PRADAXA) capsule 150 mg  150 mg Oral BID    hydrALAZINE (APRESOLINE) tablet 25 mg  25 mg Oral BID    metoprolol succinate (TOPROL-XL) XL tablet 100 mg  100 mg Oral DAILY    pantoprazole (PROTONIX) tablet 40 mg  40 mg Oral ACB    venlafaxine-SR (EFFEXOR-XR) capsule 150 mg  150 mg Oral BID WITH MEALS    sodium chloride (NS) flush 5-40 mL  5-40 mL IntraVENous Q8H    sodium chloride (NS) flush 5-40 mL  5-40 mL IntraVENous PRN    bumetanide (BUMEX) injection 1 mg  1 mg IntraVENous Q12H    aspirin chewable tablet 81 mg  81 mg Oral DAILY    insulin lispro (HUMALOG) injection   SubCUTAneous AC&HS    glucose chewable tablet 16 g  4 Tab Oral PRN    dextrose (D50W) injection syrg 12.5-25 g  12.5-25 g IntraVENous PRN    glucagon (GLUCAGEN) injection 1 mg  1 mg IntraMUSCular PRN        Nicholas Duong MD

## 2020-08-12 NOTE — PROGRESS NOTES
[address] END OF SHIFT REPORT      Bedside and Verbal shift change report GIVEN TO /DIAN Mares. Report included the following information SBAR and Kardex. SIGNIFICANT CHANGES DURING SHIFT:        CONCERNS TO ADDRESS WITH MD:          [address] NURSING NOTE   Admission Date 8/9/2020   Admission Diagnosis Respiratory failure ([facility] [address] [J96.90]   Consults IP CONSULT TO CARDIOLOGY      Cardiac Monitoring [x] Yes [] No      Purposeful Hourly Rounding [x] Yes    Ananya Score Total Score: 1   Ananya score 3 or > [x] Bed Alarm [] Avasys [] 1:1 sitter [] Patient refused (Signed refusal form in chart)   Preet Score Preet Score: 18   Preet score 14 or < [] PMT consult [] Wound Care consult    []  Specialty bed  [] Nutrition consult      Influenza Vaccine Received Flu Vaccine for Current Season (usually Sept-March): Not Flu Season           Oxygen needs? [x] Room air Oxygen @  []1L    []2L    []3L   []4L    []5L   []6L via  NC   Chronic home O2 use? [] Yes [] No  Perform O2 challenge test and document in progress note using MOWGLIe (.Homeoxygen)      Last bowel movement Last Bowel Movement Date: 08/10/20      Urinary Catheter             LDAs               Peripheral IV 08/09/20 Right Antecubital (Active)   Site Assessment Clean, dry, & intact 08/11/20 1940   Phlebitis Assessment 0 08/11/20 1940   Infiltration Assessment 0 08/11/20 1940   Dressing Status Clean, dry, & intact 08/11/20 1940   Dressing Type Tape;Transparent 08/11/20 1940   Hub Color/Line Status Pink 08/11/20 1940   Action Taken Open ports on tubing capped 08/10/20 1443   Alcohol Cap Used Yes 08/10/20 1443                         Readmission Risk Assessment Tool Score High Risk            37       Total Score        3 Has Seen PCP in Last 6 Months (Yes=3, No=0)    4 IP Visits Last 12 Months (1-3=4, 4=9, >4=11)    5 Pt.  Coverage (Medicare=5 , Medicaid, or Self-Pay=4)    25 Charlson Comorbidity Score (Age + Comorbid Conditions)        Criteria that do not apply: . Living with Significant Other. Assisted Living. LTAC. SNF.  or   Rehab    Patient Length of Stay (>5 days = 3)       Expected Length of Stay 4d 2h   Actual Length of Stay 3

## 2020-08-12 NOTE — PROGRESS NOTES
Problem: Risk for Spread of Infection  Goal: Prevent transmission of infectious organism to others  Description: Prevent the transmission of infectious organisms to other patients, staff members, and visitors. Outcome: Progressing Towards Goal     Problem: Falls - Risk of  Goal: *Absence of Falls  Description: Document Satish Juarez Fall Risk and appropriate interventions in the flowsheet. Outcome: Progressing Towards Goal  Note: Fall Risk Interventions:  Mobility Interventions: Assess mobility with egress test, Bed/chair exit alarm, Communicate number of staff needed for ambulation/transfer, Patient to call before getting OOB         Medication Interventions: Assess postural VS orthostatic hypotension, Bed/chair exit alarm, Evaluate medications/consider consulting pharmacy, Patient to call before getting OOB                   Problem: Pressure Injury - Risk of  Goal: *Prevention of pressure injury  Description: Document Preet Scale and appropriate interventions in the flowsheet.   Outcome: Progressing Towards Goal  Note: Pressure Injury Interventions:  Sensory Interventions: Assess changes in LOC, Avoid rigorous massage over bony prominences, Float heels, Keep linens dry and wrinkle-free    Moisture Interventions: Absorbent underpads, Maintain skin hydration (lotion/cream)    Activity Interventions: Increase time out of bed, Pressure redistribution bed/mattress(bed type), PT/OT evaluation    Mobility Interventions: Assess need for specialty bed, HOB 30 degrees or less, Float heels, PT/OT evaluation    Nutrition Interventions: Document food/fluid/supplement intake, Offer support with meals,snacks and hydration    Friction and Shear Interventions: Apply protective barrier, creams and emollients, Feet elevated on foot rest, Minimize layers

## 2020-08-12 NOTE — ROUTINE PROCESS
DISCHARGE SUMMARY FROM NURSE The patient is stable for discharge. I have reviewed the discharge instructions with the patient and spouse. The patient and spouse verbalized understanding. All questions were fully answered. The patient and spouse verbalized no complaints. Hard scripts and medication handouts were given and reviewed with the patient. Appropriate educational materials and medication side effects teaching were provided also provided. Cardiac monitor and IV line(s) were removed. The following personal items collected during your admission were returned to the patient/family Home medications: no  
Dental Appliance: Dental Appliances: None Vision: Visual Aid: None Hearing Aid:   
Jewelry: Jewelry: None Clothing: Clothing: At bedside Other Valuables: Other Valuables: None Valuables sent to safe:   
 
OR _________________________________________________________________________________________ There were no personal belongings, valuables or home medications left at patient's bedside,  or safe. Patient discharged. Went over all meds which were changed including regular meds. She has wound care supplies at home. She is ambulatory. No complaints of pain

## 2020-08-12 NOTE — ROUTINE PROCESS
Home Oxygen Test 
Date of test: 08/12 Time of test: 0930 Sa02 100 % on room air AT REST. Sa02 95 % on room air DURING AMBULATION. Sa02 na % on na Liters DURING AMBULATION. Sa02 100 % on 0 Liters AT REST/AFTER AMBULATION.

## 2020-08-13 ENCOUNTER — PATIENT OUTREACH (OUTPATIENT)
Dept: CASE MANAGEMENT | Age: 70
End: 2020-08-13

## 2020-08-13 RX ORDER — LORATADINE 10 MG/1
10 TABLET ORAL 2 TIMES DAILY
COMMUNITY
End: 2021-03-08 | Stop reason: ALTCHOICE

## 2020-08-13 NOTE — PROGRESS NOTES
Patient was admitted to [facility] on 8-9-20 and discharged on 8-12-20 for:    DISCHARGE DIAGNOSIS:     Acute Hypoxic Respiratory Failure POA   Acute on chronic systolic heart failure with EF 21-25%  CKD3  Diabetes mellitus type 2  Hyperlipidemia  Paroxysmal A.fib  H/o PPM placement  HTN    Patient and , Jessica Givens (on PHI), were contacted within one business day of discharge. Discharge Challenges to be reviewed by the provider:   Additional needs identified to be addressed with provider:  yes  - Encouraged patient to weigh every morning and record her home weights and patient states she will do so. Patient's home reported weight this morning was 273.8 pounds. Reviewed the correct procedure for daily weights and the recording of daily weights with patient and  and both verbalize an understanding. Informed patient and  that if patient should gain 3 pounds in 24 hours and/or greater than 5 pounds in one week that patient should call PCP and/or cardiologist immediately, both verbalize an understanding. Patient denies SOB and/or CP at this time, denies SOB upon exertion, denies swelling in lower extremities, and has no red flags to report at this time. - Patient reports home weight this morning of 273.8 pounds. -  reports patient is not taking Benadryl 25mg cap twice a day, and is taking Claritin 10 tab twice a day instead. Patient states Benadryl makes her drowsy and she prefers Claritin. 'Not Taking' notation made on Benadryl and Claritin was added to patient's current medication list today. Please consider removing Benadryl from patient's current medication list.     Discussed COVID-19 related testing which was available at this time. Test results were negative.  Patient informed of results, if available? yes   Method of communication with provider : chart routing       Component      Latest Ref Rng & Units 8/12/2020 8/12/2020 8/11/2020 8/11/2020 11:40 AM  8:33 AM  8:49 PM  4:24 PM   GLUCOSE,FAST - POC      65 - 100 mg/dL 133 (H) 158 (H) 213 (H) 209 (H)     Component      Latest Ref Rng & Units 8/11/2020 8/11/2020          11:56 AM  7:55 AM   GLUCOSE,FAST - POC      65 - 100 mg/dL 184 (H) 119 (H)     Component      Latest Ref Rng & Units 8/10/2020           3:55 AM   Hemoglobin A1c, (calculated)      4.0 - 5.6 % 8.4 (H)     Component      Latest Ref Rng & Units 8/12/2020 8/11/2020 8/10/2020           5:30 AM  5:08 AM  3:55 AM   Potassium      3.5 - 5.1 mmol/L 3.2 (L) 3.2 (L) 3.3 (L)   Chloride      97 - 108 mmol/L 111 (H) 110 (H) 108     Component      Latest Ref Rng & Units 8/12/2020 8/11/2020 8/10/2020           5:30 AM  5:08 AM  3:55 AM   BUN      6 - 20 MG/DL 20 20 18   Creatinine      0.55 - 1.02 MG/DL 1.33 (H) 1.30 (H) 1.28 (H)   BUN/Creatinine ratio      12 - 20   15 15 14   GFR est AA      >60 ml/min/1.73m2 48 (L) 50 (L) 50 (L)   GFR est non-AA      >60 ml/min/1.73m2 40 (L) 41 (L) 42 (L)   Calcium      8.5 - 10.1 MG/DL 7.7 (L) 7.7 (L) 7.6 (L)     Component      Latest Ref Rng & Units 8/10/2020           3:55 AM   Alk.  phosphatase      45 - 117 U/L 151 (H)     Component      Latest Ref Rng & Units 8/10/2020           3:55 AM   Albumin      3.5 - 5.0 g/dL 2.9 (L)     Component      Latest Ref Rng & Units 8/10/2020           3:55 AM   A-G Ratio      1.1 - 2.2   0.8 (L)     Component      Latest Ref Rng & Units 8/11/2020 8/10/2020 8/10/2020 8/10/2020           5:08 AM  3:55 AM  3:55 AM  3:55 AM   HGB      11.5 - 16.0 g/dL 10.9 (L)        Component      Latest Ref Rng & Units 8/10/2020           3:55 AM   HGB      11.5 - 16.0 g/dL 10.0 (L)     Component      Latest Ref Rng & Units 8/11/2020 8/10/2020 8/10/2020 8/10/2020           5:08 AM  3:55 AM  3:55 AM  3:55 AM   MCHC      30.0 - 36.5 g/dL 29.8 (L)      RDW      11.5 - 14.5 % 18.2 (H)        Component      Latest Ref Rng & Units 8/10/2020           3:55 AM   MCHC      30.0 - 36.5 g/dL 30.0   RDW 11.5 - 14.5 % 17.7 (H)     Component      Latest Ref Rng & Units 2020 8/10/2020 8/10/2020 8/10/2020           5:08 AM  3:55 AM  3:55 AM  3:55 AM   IMMATURE GRANULOCYTES      0.0 - 0.5 % 1 (H)        Component      Latest Ref Rng & Units 8/10/2020           3:55 AM   IMMATURE GRANULOCYTES      0.0 - 0.5 % 1 (H)     Component      Latest Ref Rng & Units 2020 8/10/2020 8/10/2020 8/10/2020           5:08 AM  3:55 AM  3:55 AM  3:55 AM   ABS. IMM. GRANS.      0.00 - 0.04 K/UL 0.1 (H)        Component      Latest Ref Rng & Units 8/10/2020           3:55 AM   ABS. IMM. GRANS.      0.00 - 0.04 K/UL 0.1 (H)     Component      Latest Ref Rng & Units 2020          11:34 AM   pCO2 (POC)      35.0 - 45.0 MMHG 45.5 (H)   pO2 (POC)      80 - 100 MMHG 71 (L)     Component      Latest Ref Rng & Units 2020          11:40 AM   NT pro-BNP      <125 PG/ML 2,282 (H)     Component      Latest Ref Rng & Units 2020          11:40 AM   aPTT      22.1 - 32.0 sec 32.2 (H)     Advance Care Planning:   Does patient have an Advance Directive:  reviewed and current     Was this a readmission? no   Patient stated reason for the admission: \"I couldn't breathe when I was lying in the bed. \"   Patients top risk factors for readmission: medical condition  Interventions to address risk factors: Education provided regarding signs/symptome of HF and respiratory failure, both patient and  verbalize an understanding. Care Transition Nurse (CTN) contacted the patient by telephone to perform post hospital discharge assessment. Verified name and  with patient as identifiers. Provided introduction to self, and explanation of the CTN role. CTN reviewed discharge instructions, medical action plan and red flags with patient and  who verbalized understanding. Patient and  given an opportunity to ask questions and does not have any further questions or concerns at this time.  The patient agrees to contact the PCP office for questions related to their healthcare. Medication reconciliation was performed with , who verbalizes understanding of administration of home medications. Advised obtaining a 90-day supply of all daily and as-needed medications. Referral to Pharm D needed: no     Home Health/Outpatient orders at discharge: [address]: n/a  Date of initial visit: [address] ordered at discharge: none  [address] received: n/a    Covid Risk Education    Patient has following risk factors of: heart failure and acute respiratory failure. Education provided regarding infection prevention, and signs and symptoms of COVID-19 and when to seek medical attention with patient and  who verbalized understanding. Discussed exposure protocols and quarantine From CDC: Are you at higher risk for severe illness?  and given an opportunity for questions and concerns. The patient and  agree to contact the COVID-19 hotline [phone] or PCP office for questions related to COVID-19. For more information on steps you can take to protect yourself, see CDC's How to Protect Yourself     Patient/family/caregiver given information for [employer] and agrees to enroll no  Patient's preferred e-mail: declines  Patient's preferred phone number: declines    Discussed follow-up appointments. If no appointment was previously scheduled, appointment scheduling offered: yes  [facility] follow up appointment(s):   Future Appointments   Date Time Provider Tammy Perales   8/26/2020 10:30 AM Hernando Walls MD [facility] AMB   10/20/2020  8:30 AM Hernando Walls MD [facility] AMB   12/10/2020 11:30 AM CHAIR 2 [address]     Non-BS follow up appointment(s): Please see below for non-[facility] follow-up appointments. Plan for follow-up call in 10-14 days based on severity of symptoms and risk factors.   CTN provided contact information for future needs. Goals Addressed                 This Visit's Progress     Attends follow-up appointments as directed. 8-13-20: Patient has VISHNU appointment scheduled with Dr. Jasmin Walls/PCP on 8-26-20, cardio follow- up appointment scheduled with Dr. Keith Covington on 8-21-20, and nephrology follow-up appointment scheduled with Dr. Shankar Spaulding on 8-25-20.  states he is currently providing transportation as needed. Genesis Dooley Understands red flags post discharge. 8-13-20: Red flags of HF and respiratory failure reviewed with patient and  and both verbalize an understanding. Encouraged patient to weigh every morning and record her home weights and patient states she will do so. Patient's home reported weight this morning was 273.8 pounds. Reviewed the correct procedure for daily weights and the recording of daily weights with patient and  and both verbalize an understanding. Informed patient and  that if patient should gain 3 pounds in 24 hours and/or greater than 5 pounds in one week that patient should call PCP and/or cardiologist immediately, both verbalize an understanding. Patient denies SOB and/or CP at this time, denies SOB upon exertion, denies swelling in lower extremities, and has no red flags to report at this time. Care Transitions Nurse will review red flags again on next phone conversation with patient/.  Alonso Vee

## 2020-08-23 DIAGNOSIS — F41.9 ANXIETY: ICD-10-CM

## 2020-08-24 ENCOUNTER — TELEPHONE (OUTPATIENT)
Dept: INTERNAL MEDICINE CLINIC | Age: 70
End: 2020-08-24

## 2020-08-24 RX ORDER — CLONAZEPAM 0.5 MG/1
TABLET ORAL
Qty: 90 TAB | Refills: 0 | Status: SHIPPED | OUTPATIENT
Start: 2020-08-24 | End: 2020-11-23

## 2020-08-24 NOTE — TELEPHONE ENCOUNTER
Patient states she was in the hospital two weeks a go for shortness of breath, and fluid on her lungs. She tested negative for corona virus. She states her  has pneumonia. She has an appt on 8/26/20 @ 10:30. She wants to know if she should keep the appt.      [phone]

## 2020-08-25 RX ORDER — CYCLOBENZAPRINE HCL 10 MG
TABLET ORAL
Qty: 30 TAB | Refills: 0 | Status: SHIPPED | OUTPATIENT
Start: 2020-08-25 | End: 2020-09-09

## 2020-08-26 ENCOUNTER — OFFICE VISIT (OUTPATIENT)
Dept: INTERNAL MEDICINE CLINIC | Age: 70
End: 2020-08-26
Payer: MEDICARE

## 2020-08-26 VITALS
RESPIRATION RATE: 20 BRPM | HEIGHT: 64 IN | OXYGEN SATURATION: 97 % | BODY MASS INDEX: 48.14 KG/M2 | SYSTOLIC BLOOD PRESSURE: 122 MMHG | HEART RATE: 80 BPM | TEMPERATURE: 97.8 F | WEIGHT: 282 LBS | DIASTOLIC BLOOD PRESSURE: 82 MMHG

## 2020-08-26 DIAGNOSIS — I48.20 CHRONIC ATRIAL FIBRILLATION (HCC): Chronic | ICD-10-CM

## 2020-08-26 DIAGNOSIS — E11.40 TYPE 2 DIABETES MELLITUS WITH DIABETIC NEUROPATHY, WITHOUT LONG-TERM CURRENT USE OF INSULIN (HCC): Chronic | ICD-10-CM

## 2020-08-26 DIAGNOSIS — J96.21 ACUTE ON CHRONIC RESPIRATORY FAILURE WITH HYPOXIA (HCC): Primary | ICD-10-CM

## 2020-08-26 DIAGNOSIS — I50.22 SYSTOLIC CHF, CHRONIC (HCC): Chronic | ICD-10-CM

## 2020-08-26 DIAGNOSIS — I25.118 CORONARY ARTERY DISEASE OF NATIVE HEART WITH STABLE ANGINA PECTORIS, UNSPECIFIED VESSEL OR LESION TYPE (HCC): ICD-10-CM

## 2020-08-26 DIAGNOSIS — I25.5 ISCHEMIC CARDIOMYOPATHY: Chronic | ICD-10-CM

## 2020-08-26 DIAGNOSIS — I10 ESSENTIAL HYPERTENSION: Chronic | ICD-10-CM

## 2020-08-26 LAB
A-G RATIO,AGRAT: 1.3 RATIO
ALBUMIN SERPL-MCNC: 3.9 G/DL (ref 3.9–5.4)
ALP SERPL-CCNC: 112 U/L (ref 38–126)
ALT SERPL-CCNC: 20 U/L (ref 0–35)
ANION GAP SERPL CALC-SCNC: 13 MMOL/L
AST SERPL W P-5'-P-CCNC: 21 U/L (ref 14–36)
BILIRUB SERPL-MCNC: 0.9 MG/DL (ref 0.2–1.3)
BUN SERPL-MCNC: 33 MG/DL (ref 7–17)
BUN/CREATININE RATIO,BUCR: 24 RATIO
CALCIUM SERPL-MCNC: 9.4 MG/DL (ref 8.4–10.2)
CHLORIDE SERPL-SCNC: 110 MMOL/L (ref 98–107)
CO2 SERPL-SCNC: 23 MMOL/L (ref 22–32)
CREAT SERPL-MCNC: 1.4 MG/DL (ref 0.7–1.2)
ERYTHROCYTE [DISTWIDTH] IN BLOOD BY AUTOMATED COUNT: 19.3 %
GLOBULIN,GLOB: 2.9
GLUCOSE SERPL-MCNC: 102 MG/DL (ref 65–105)
HCT VFR BLD AUTO: 36.5 % (ref 37–51)
HGB BLD-MCNC: 11.5 G/DL (ref 12–18)
LYMPHOCYTES ABSOLUTE: 1.5 K/UL (ref 0.6–4.1)
LYMPHOCYTES NFR BLD: 17.2 % (ref 10–58.5)
MCH RBC QN AUTO: 26.5 PG (ref 26–32)
MCHC RBC AUTO-ENTMCNC: 31.5 G/DL (ref 30–36)
MCV RBC AUTO: 84 FL (ref 80–97)
MONOCYTES ABS-DIF,2141: 0.6 K/UL (ref 0–1.8)
MONOCYTES NFR BLD: 6.7 % (ref 0.1–24)
NEUTROPHILS # BLD: 76.1 % (ref 37–92)
NEUTROPHILS ABS,2156: 6.5 K/UL (ref 2–7.8)
PLATELET # BLD AUTO: 241 K/UL (ref 140–440)
POTASSIUM SERPL-SCNC: 5 MMOL/L (ref 3.6–5)
PROT SERPL-MCNC: 6.8 G/DL (ref 6.3–8.2)
RBC # BLD AUTO: 4.34 M/UL (ref 4.2–6.3)
SODIUM SERPL-SCNC: 146 MMOL/L (ref 137–145)
WBC # BLD AUTO: 8.6 K/UL (ref 4.1–10.9)

## 2020-08-26 PROCEDURE — G8427 DOCREV CUR MEDS BY ELIG CLIN: HCPCS | Performed by: INTERNAL MEDICINE

## 2020-08-26 PROCEDURE — 99495 TRANSJ CARE MGMT MOD F2F 14D: CPT | Performed by: INTERNAL MEDICINE

## 2020-08-26 PROCEDURE — 36415 COLL VENOUS BLD VENIPUNCTURE: CPT | Performed by: INTERNAL MEDICINE

## 2020-08-26 PROCEDURE — 85025 COMPLETE CBC W/AUTO DIFF WBC: CPT | Performed by: INTERNAL MEDICINE

## 2020-08-26 PROCEDURE — 80053 COMPREHEN METABOLIC PANEL: CPT | Performed by: INTERNAL MEDICINE

## 2020-08-26 NOTE — PROGRESS NOTES
Billy Zhao is a 81 y.o. female presenting for Transitions Of Care  . 1. Have you been to the ER, urgent care clinic since your last visit? Hospitalized since your last visit? Admit date: 8/9/2020  Discharge date and time: 8/12/2020     DISCHARGE DIAGNOSIS:     Acute Hypoxic Respiratory Failure POA   Acute on chronic systolic heart failure with EF 21-25%  CKD3  Diabetes mellitus type 2  Hyperlipidemia  Paroxysmal A.fib  H/o PPM placement  HTN       2. Have you seen or consulted any other health care providers outside of the [facility] since your last visit? Include any pap smears or colon screening. Dr Geremias Bravo, last 12 mths 8/13/2020   Able to walk? Yes   Fall in past 12 months? No   Fall with injury? -   Number of falls in past 12 months -   Fall Risk Score -         Abuse Screening Questionnaire 4/30/2020   Do you ever feel afraid of your partner? N   Are you in a relationship with someone who physically or mentally threatens you? N   Is it safe for you to go home?  Y       3 most recent PHQ Screens 4/30/2020   Little interest or pleasure in doing things Not at all   Feeling down, depressed, irritable, or hopeless Not at all   Total Score PHQ 2 0   Trouble falling or staying asleep, or sleeping too much Not at all   Feeling tired or having little energy Not at all   Poor appetite, weight loss, or overeating Not at all   Feeling bad about yourself - or that you are a failure or have let yourself or your family down Not at all   Trouble concentrating on things such as school, work, reading, or watching TV Not at all   Moving or speaking so slowly that other people could have noticed; or the opposite being so fidgety that others notice Not at all   Thoughts of being better off dead, or hurting yourself in some way Not at all   PHQ 9 Score 0   How difficult have these problems made it for you to do your work, take care of your home and get along with others Not difficult at all       There are no discontinued medications.

## 2020-08-26 NOTE — PROGRESS NOTES
This note will not be viewable in [address] Conner Bell is a 81 y.o. female and presents with Transitions Of Care  . Subjective:  Mrs. Ilana Phillips returns to the office today and transition of care subsequent to a hospitalization at [facility] from 8/9 until 8/12 when she was admitted with acute hypoxic respiratory failure related to acute on chronic systolic heart failure. Patient has coronary artery disease with history of a ischemic cardiomyopathy with chronic systolic heart failure for which she had an acute exacerbation. The patient had gained significant amounts of weight. Her EF runs 21 to 25%. The patient was placed on BiPAP initially and then responded to diuresis with IV Bumex. She was seen in the hospital by cardiology. Her oxygen was weaned and she was slowly transitioned to p.o. diuretic diuresis. The edema in her lower extremities decreased. Her diabetes was maintained on her same medication. She does have a history of atrial fibrillation and was continued on her usual medications and anticoagulation. Since being at home she has tolerated the p.o. Bumex without any orthostatic dizziness or muscle cramping. Her lower extremity edema has not been unchanged and her weight has not significantly changed either. She denies cough, wheezing, PND or orthopnea.     Past Medical History:   Diagnosis Date    Anxiety 1/22/2018    Arthritis     OA    Asthma     Diabetes ([address]     Essential hypertension     GERD (gastroesophageal reflux disease)     Hypercholesterolemia 1/22/2018    Hypertension     Ill-defined condition     diverticulitis    Long-term use of high-risk medication 1/22/2018    Neuropathy     Obesity (BMI 30-39.9) 4/30/2019    Other ill-defined conditions(799.89)     IBS, spinal stenosis    Plantar fasciitis     Psychiatric disorder     depression, anxiety    Sleep apnea     uses CPAP    Type 2 diabetes mellitus with diabetic neuropathy, without long-term current use of insulin ([address] 6/5/2016     Past Surgical History:   Procedure Laterality Date    CARDIAC SURG PROCEDURE UNLIST      stent    COLONOSCOPY N/A 3/14/2018    COLONOSCOPY performed by Irlanda Smith MD at [facility] ENDOSCOPY    HX APPENDECTOMY      HX CHOLECYSTECTOMY  11/15/2018    HX HYSTERECTOMY      HX PACEMAKER       Allergies   Allergen Reactions    Sulfa (Sulfonamide Antibiotics) Swelling    Amoxicillin Swelling     Current Outpatient Medications   Medication Sig Dispense Refill    cyclobenzaprine (FLEXERIL) 10 mg tablet Take 1 tablet by mouth three times daily as needed for muscle spasm 30 Tab 0    clonazePAM (KlonoPIN) 0.5 mg tablet TAKE 1 TABLET BY MOUTH ONCE DAILY AT NIGHT . DO NOT EXCEED  0.5 MG PER DAY 90 Tab 0    loratadine (Claritin) 10 mg tablet Take 10 mg by mouth two (2) times a day. Indications: Patient states she is using Claritin instead of Benadryl, is not taking Benadryl per patient.  isosorbide mononitrate ER (IMDUR) 60 mg CR tablet Take 1 Tab by mouth daily. 30 Tab 1    bumetanide (BUMEX) 2 mg tablet Take 1 Tab by mouth daily. 90 Tab 1    lisinopriL (PRINIVIL, ZESTRIL) 40 mg tablet Take 0.5 Tabs by mouth daily. 30 Tab 1    magnesium oxide (MAG-OX) 400 mg tablet Take 1 tablet by mouth twice daily 60 Tab 3    glimepiride (AMARYL) 2 mg tablet Take 1 tablet by mouth twice daily 60 Tab 3    mupirocin calcium (BACTROBAN) 2 % topical cream Apply  to affected area two (2) times a day. 15 g 0    clotrimazole-betamethasone (LOTRISONE) topical cream APPLY TO THE AFFECTED AREA 2 TIMES DAILY 15 g 0    omeprazole (PRILOSEC) 40 mg capsule TAKE 1 CAPSULE EVERY DAY 90 Cap 3    potassium chloride SR (KLOR-CON 10) 10 mEq tablet Take 1 Tab by mouth three (3) times daily. 540 Tab 3    hydrALAZINE (APRESOLINE) 25 mg tablet Take 25 mg by mouth two (2) times a day.  calcium carb/vit D2/minerals (CALTRATE PLUS PO) Take 500 mg by mouth daily.       SYMBICORT 160-4.5 mcg/actuation HFAA INHALE 2 PUFFS BY MOUTH TWICE DAILY 1 Inhaler 3    albuterol (PROVENTIL HFA, VENTOLIN HFA, PROAIR HFA) 90 mcg/actuation inhaler Take 1 Puff by inhalation every four (4) hours as needed for Wheezing. 1 Inhaler 6    diphenhydrAMINE (BENADRYL) 25 mg capsule Take 25 mg by mouth two (2) times a day.  nystatin (MYCOSTATIN) topical cream Apply  to affected area two (2) times daily as needed for Skin Irritation (Rash).  traMADol (ULTRAM) 50 mg tablet Take 50 mg by mouth daily as needed for Pain (Used to supplement the Lyrica when lyrica doesnt manage the pain on its own).  dabigatran etexilate (PRADAXA) 150 mg capsule Take 1 Cap by mouth two (2) times a day. IF NO BLEEDING AT CATH SITE. 60 Cap 12    amLODIPine (NORVASC) 2.5 mg tablet Take 1 Tab by mouth daily. (Patient taking differently: Take 10 mg by mouth daily.) 90 Tab 3    metoprolol succinate (TOPROL-XL) 100 mg tablet Take 1 Tab by mouth daily. 30 Tab prn    acetaminophen (TYLENOL EXTRA STRENGTH) 500 mg tablet Take 1,000 mg by mouth every eight (8) hours as needed for Pain (Headache).  lidocaine (ASPERCREME, LIDOCAINE,) 4 % topical cream Apply  to affected area daily as needed for Pain (Knee pain).  sodium chloride (AYR SALINE) 0.65 % nasal squeeze bottle 2 Sprays by Both Nostrils route every eight (8) hours as needed.  conjugated estrogens (PREMARIN) 0.625 mg/gram vaginal cream Insert 0.5 g into vagina two (2) times a week. Tuesdays and Thursdays       venlafaxine-SR (EFFEXOR XR) 150 mg capsule Take 150 mg by mouth two (2) times daily (with meals).  pregabalin (LYRICA) 200 mg capsule Take 300 mg by mouth two (2) times a day.  cholecalciferol, vitamin d3, (VITAMIN D3) 400 unit cap Take 400 Units by mouth daily.  aspirin 81 mg chewable tablet Take 81 mg by mouth daily.  atorvastatin (LIPITOR) 40 mg tablet Take 1 Tab by mouth nightly.  30 Tab 12     Social History     Socioeconomic History    Marital status:      Spouse name: Not on file    Number of children: Not on file    Years of education: Not on file    Highest education level: Not on file   Tobacco Use    Smoking status: Never Smoker    Smokeless tobacco: Never Used   Substance and Sexual Activity    Alcohol use: No    Drug use: No     Family History   Problem Relation Age of Onset    Arthritis-osteo Mother     Hypertension Mother     High Cholesterol Mother     Crohn's Disease Mother     Heart Disease Mother     Alcohol abuse Father     High Cholesterol Sister     Hypertension Sister     Thyroid Disease Sister     COPD Sister     High Cholesterol Brother     Hypertension Brother     COPD Brother     COPD Child     Inflammatory Bowel Dz Child        Health Maintenance   Topic Date Due    DTaP/Tdap/Td series (1 - Tdap) 09/13/1973    Shingrix Vaccine Age 50> (1 of 2) 09/13/2002    Breast Cancer Screen Mammogram  04/04/2020    Pneumococcal 65+ years (2 of 2 - PPSV23) 10/11/2020    Foot Exam Q1  10/29/2020    A1C test (Diabetic or Prediabetic)  04/22/2021    Lipid Screen  04/30/2021    Medicare Yearly Exam  05/01/2021    MICROALBUMIN Q1  05/01/2021    GLAUCOMA SCREENING Q2Y  12/11/2021    Eye Exam Retinal or Dilated  12/11/2021    Colonoscopy  03/14/2028    Hepatitis C Screening  Completed    Bone Densitometry (Dexa) Screening  Completed        Review of Systems  Constitutional: negative for fevers, chills, anorexia and weight loss  Eyes:   negative for visual disturbance and irritation  ENT:   negative for tinnitus,sore throat,nasal congestion,ear pain,hoarseness  Respiratory:  negative for cough, hemoptysis, dyspnea,wheezing  CV:   negative for chest pain, palpitations.   Positive for chronic lower extremity edema GI:   negative for nausea, vomiting, diarrhea, abdominal pain,melena  Endo:               negative for polyuria,polydipsia,polyphagia,heat intolerance  Genitourinary: negative for frequency, dysuria and hematuria  Integumentary: negative for rash and pruritus  Hematologic:  negative for easy bruising and gum/nose bleeding  Musculoskel: negative for myalgias, arthralgias, back pain, muscle weakness, joint pain  Neurological:  negative for headaches, dizziness, vertigo, memory problems and gait   Behavl/Psych: negative for feelings of anxiety, depression, mood changes  ROS otherwise negative      Objective:  Visit Vitals  /82 (BP 1 Location: Left arm, BP Patient Position: Sitting)   Pulse 80   Temp 97.8 °F (36.6 °C) (Oral)   Resp 20   Ht 5' 4\" (1.626 m)   Wt 282 lb (127.9 kg)   SpO2 97%   BMI 48.41 kg/m²     Body mass index is 48.41 kg/m². Physical Exam:   General appearance - alert, well appearing, and in no distress  Mental status - alert, oriented to person, place, and time  EYE-VIVEK, EOMI,conjunctiva normal bilaterally, lids normal  ENT-ENT exam normal, no neck nodes or sinus tenderness  Nose - normal and patent, no erythema,  Or discharge   Mouth - mucous membranes moist, pharynx normal without lesions  Neck - supple, no significant adenopathy or bruit  Chest - clear to auscultation, no wheezes, rales or rhonchi. Heart - normal rate, regular rhythm, normal S1, S2, no murmurs, rubs, clicks or gallops   Abdomen - soft, nontender, nondistended, no masses or organomegaly  Lymph- no adenopathy palpable  Ext-1+ bilateral lower extremity edema present with stasis dermatitis right greater than left  Skin-Warm and dry.  no hyperpigmentation, vitiligo, or suspicious lesions  Neuro -alert, oriented, normal speech, no focal findings or movement disorder noted      Assessment/Plan:  Diagnoses and all orders for this visit:    Acute on chronic respiratory failure with hypoxia (HCC)    Systolic CHF, chronic (HCC)  -     COLLECTION VENOUS BLOOD,VENIPUNCTURE  -     CBC WITH AUTOMATED DIFF  -     METABOLIC PANEL, COMPREHENSIVE    Ischemic cardiomyopathy    Coronary artery disease of native heart with stable angina pectoris, unspecified vessel or lesion type ([address]    Chronic atrial fibrillation ([address]    Essential hypertension    Type 2 diabetes mellitus with diabetic neuropathy, without long-term current use of insulin ([address]        Other instructions: The patient's medications were reviewed and reconciled. Patient's weight is unchanged from that in the hospital and her diuretics will not be adjusted at this time. A no added salt diet is endorsed    Await results of follow-up labs    Continued follow-up with cardiology    Recheck here in 4 weeks time    Follow-up and Dispositions    · Return in about 4 weeks (around 9/23/2020). I have reviewed with the patient details of the assessment and plan and all questions were answered. Relevent patient education was performed. The most recent lab findings were reviewed with the patient. An After Visit Summary was printed and given to the patient. Samson Zhang MD    Please note that this dictation was completed with [employer], the computer voice recognition software. Quite often unanticipated grammatical, syntax, homophones, and other interpretive errors are inadvertently transcribed by the computer software. Please disregard these errors. Please excuse any errors that have escaped final proofreading.

## 2020-09-09 RX ORDER — CYCLOBENZAPRINE HCL 10 MG
TABLET ORAL
Qty: 30 TAB | Refills: 0 | Status: SHIPPED | OUTPATIENT
Start: 2020-09-09 | End: 2020-09-29 | Stop reason: SDUPTHER

## 2020-09-18 RX ORDER — TRIAMCINOLONE ACETONIDE 1 MG/G
CREAM TOPICAL
Qty: 15 G | Refills: 0 | Status: SHIPPED | OUTPATIENT
Start: 2020-09-18 | End: 2020-09-29 | Stop reason: SDUPTHER

## 2020-09-23 ENCOUNTER — PATIENT OUTREACH (OUTPATIENT)
Dept: CASE MANAGEMENT | Age: 70
End: 2020-09-23

## 2020-09-23 NOTE — PROGRESS NOTES
Contacted patient for follow up. CTN spoke with patient and her , Jose Hoffman. Patient tells CTN that her  assists with her care by managing her medications utilizing a pill box. He also schedules, transports and attends medical appointments. Goals      Attends follow-up appointments as directed. 09/23/20  Patient reports attending the following appointments since hospital discharge-   [address] [place] [address] PCP on 8/26/2020, follow up [email or web address]- Dr. Nadine Diez on 8/25/2020  [facility] Sharif- Dr. Cyrilla Ahumada on 8/21/2020, follow up scheduled 12/2020   Pulm/sleep center- Dr. Aydin Jack not due until 1/2021 8-13-20: Patient has [facility] appointment scheduled with Dr. Crystal Mcdermott. [facility]/PCP on 8-26-20, cardio follow- up appointment scheduled with Dr. Erasto Trujillo on 8-21-20, and nephrology follow-up appointment scheduled with Dr. Nena Balbuena on 8-25-20.  states he is currently providing transportation as needed. Coty Lockwood Understands red flags post discharge. 09/23/20   CTN reviewed HF red flags with patient.  Patient weighs daily and report weight is stable, consistently between 279-281 lbs. This am was 279 lbs.  Patient verbalizes understanding of when to report weight gain (2-3 lbs in day or 5 lbs in week)   Patient continues to monitor sodium intake. Patient does not use table salt and is encouraged to read labels for sodium continent.  Patient denies LE edema, SOB, CP   Patient is checks BG level daily and reports range 100-140 mg/dl   Patient tries to stay active. She walks around the house but not any long distances. 8-13-20: Red flags of HF and respiratory failure reviewed with patient and  and both verbalize an understanding. Encouraged patient to weigh every morning and record her home weights and patient states she will do so. Patient's home reported weight this morning was 273.8 pounds.  Reviewed the correct procedure for daily weights and the recording of daily weights with patient and  and both verbalize an understanding. Informed patient and  that if patient should gain 3 pounds in 24 hours and/or greater than 5 pounds in one week that patient should call PCP and/or cardiologist immediately, both verbalize an understanding. Patient denies SOB and/or CP at this time, denies SOB upon exertion, denies swelling in lower extremities, and has no red flags to report at this time. Care Transitions Nurse will review red flags again on next phone conversation with patient/.  Tre Bills

## 2020-09-29 ENCOUNTER — OFFICE VISIT (OUTPATIENT)
Dept: INTERNAL MEDICINE CLINIC | Age: 70
End: 2020-09-29
Payer: MEDICARE

## 2020-09-29 VITALS
SYSTOLIC BLOOD PRESSURE: 120 MMHG | TEMPERATURE: 98 F | WEIGHT: 279 LBS | RESPIRATION RATE: 18 BRPM | HEART RATE: 81 BPM | HEIGHT: 64 IN | DIASTOLIC BLOOD PRESSURE: 70 MMHG | BODY MASS INDEX: 47.63 KG/M2 | OXYGEN SATURATION: 96 %

## 2020-09-29 DIAGNOSIS — I25.5 ISCHEMIC CARDIOMYOPATHY: Chronic | ICD-10-CM

## 2020-09-29 DIAGNOSIS — F41.9 ANXIETY: Chronic | ICD-10-CM

## 2020-09-29 DIAGNOSIS — I50.22 SYSTOLIC CHF, CHRONIC (HCC): Primary | Chronic | ICD-10-CM

## 2020-09-29 DIAGNOSIS — I10 ESSENTIAL HYPERTENSION: Chronic | ICD-10-CM

## 2020-09-29 LAB
ANION GAP SERPL CALC-SCNC: 11 MMOL/L
BUN SERPL-MCNC: 23 MG/DL (ref 7–17)
CALCIUM SERPL-MCNC: 9.2 MG/DL (ref 8.4–10.2)
CHLORIDE SERPL-SCNC: 111 MMOL/L (ref 98–107)
CO2 SERPL-SCNC: 27 MMOL/L (ref 22–32)
CREAT SERPL-MCNC: 1.5 MG/DL (ref 0.7–1.2)
GLUCOSE SERPL-MCNC: 94 MG/DL (ref 65–105)
POTASSIUM SERPL-SCNC: 4.6 MMOL/L (ref 3.6–5)
SODIUM SERPL-SCNC: 149 MMOL/L (ref 137–145)

## 2020-09-29 PROCEDURE — 1101F PT FALLS ASSESS-DOCD LE1/YR: CPT | Performed by: INTERNAL MEDICINE

## 2020-09-29 PROCEDURE — G8399 PT W/DXA RESULTS DOCUMENT: HCPCS | Performed by: INTERNAL MEDICINE

## 2020-09-29 PROCEDURE — G9717 DOC PT DX DEP/BP F/U NT REQ: HCPCS | Performed by: INTERNAL MEDICINE

## 2020-09-29 PROCEDURE — 99214 OFFICE O/P EST MOD 30 MIN: CPT | Performed by: INTERNAL MEDICINE

## 2020-09-29 PROCEDURE — 36415 COLL VENOUS BLD VENIPUNCTURE: CPT | Performed by: INTERNAL MEDICINE

## 2020-09-29 PROCEDURE — G8417 CALC BMI ABV UP PARAM F/U: HCPCS | Performed by: INTERNAL MEDICINE

## 2020-09-29 PROCEDURE — G8752 SYS BP LESS 140: HCPCS | Performed by: INTERNAL MEDICINE

## 2020-09-29 PROCEDURE — G8536 NO DOC ELDER MAL SCRN: HCPCS | Performed by: INTERNAL MEDICINE

## 2020-09-29 PROCEDURE — 80048 BASIC METABOLIC PNL TOTAL CA: CPT | Performed by: INTERNAL MEDICINE

## 2020-09-29 PROCEDURE — 3017F COLORECTAL CA SCREEN DOC REV: CPT | Performed by: INTERNAL MEDICINE

## 2020-09-29 PROCEDURE — G8754 DIAS BP LESS 90: HCPCS | Performed by: INTERNAL MEDICINE

## 2020-09-29 PROCEDURE — 1090F PRES/ABSN URINE INCON ASSESS: CPT | Performed by: INTERNAL MEDICINE

## 2020-09-29 PROCEDURE — G8427 DOCREV CUR MEDS BY ELIG CLIN: HCPCS | Performed by: INTERNAL MEDICINE

## 2020-09-29 RX ORDER — GLIMEPIRIDE 2 MG/1
TABLET ORAL
Qty: 180 TAB | Refills: 3 | Status: SHIPPED | OUTPATIENT
Start: 2020-09-29 | End: 2020-10-07 | Stop reason: SDUPTHER

## 2020-09-29 RX ORDER — ISOSORBIDE MONONITRATE 60 MG/1
60 TABLET, EXTENDED RELEASE ORAL DAILY
Qty: 90 TAB | Refills: 3 | Status: SHIPPED | OUTPATIENT
Start: 2020-09-29 | End: 2020-10-07 | Stop reason: SDUPTHER

## 2020-09-29 RX ORDER — CLOTRIMAZOLE AND BETAMETHASONE DIPROPIONATE 10; .64 MG/G; MG/G
CREAM TOPICAL
Qty: 15 G | Refills: 3 | Status: SHIPPED | OUTPATIENT
Start: 2020-09-29 | End: 2020-10-08 | Stop reason: SDUPTHER

## 2020-09-29 RX ORDER — TRIAMCINOLONE ACETONIDE 1 MG/G
CREAM TOPICAL
Qty: 15 G | Refills: 3 | Status: SHIPPED | OUTPATIENT
Start: 2020-09-29 | End: 2020-10-08 | Stop reason: SDUPTHER

## 2020-09-29 RX ORDER — LANOLIN ALCOHOL/MO/W.PET/CERES
CREAM (GRAM) TOPICAL
Qty: 180 TAB | Refills: 3 | Status: SHIPPED | OUTPATIENT
Start: 2020-09-29

## 2020-09-29 RX ORDER — CYCLOBENZAPRINE HCL 10 MG
TABLET ORAL
Qty: 90 TAB | Refills: 0 | Status: SHIPPED | OUTPATIENT
Start: 2020-09-29 | End: 2020-10-07 | Stop reason: SDUPTHER

## 2020-09-29 NOTE — PATIENT INSTRUCTIONS
DASH Diet: Care Instructions Your Care Instructions The DASH diet is an eating plan that can help lower your blood pressure. DASH stands for Dietary Approaches to Stop Hypertension. Hypertension is high blood pressure. The DASH diet focuses on eating foods that are high in calcium, potassium, and magnesium. These nutrients can lower blood pressure. The foods that are highest in these nutrients are fruits, vegetables, low-fat dairy products, nuts, seeds, and legumes. But taking calcium, potassium, and magnesium supplements instead of eating foods that are high in those nutrients does not have the same effect. The DASH diet also includes whole grains, fish, and poultry. The DASH diet is one of several lifestyle changes your doctor may recommend to lower your high blood pressure. Your doctor may also want you to decrease the amount of sodium in your diet. Lowering sodium while following the DASH diet can lower blood pressure even further than just the DASH diet alone. Follow-up care is a key part of your treatment and safety. Be sure to make and go to all appointments, and call your doctor if you are having problems. It's also a good idea to know your test results and keep a list of the medicines you take. How can you care for yourself at home? Following the DASH diet · Eat 4 to 5 servings of fruit each day. A serving is 1 medium-sized piece of fruit, ½ cup chopped or canned fruit, 1/4 cup dried fruit, or 4 ounces (½ cup) of fruit juice. Choose fruit more often than fruit juice. · Eat 4 to 5 servings of vegetables each day. A serving is 1 cup of lettuce or raw leafy vegetables, ½ cup of chopped or cooked vegetables, or 4 ounces (½ cup) of vegetable juice. Choose vegetables more often than vegetable juice. · Get 2 to 3 servings of low-fat and fat-free dairy each day. A serving is 8 ounces of milk, 1 cup of yogurt, or 1 ½ ounces of cheese. · Eat 6 to 8 servings of grains each day. A serving is 1 slice of bread, 1 ounce of dry cereal, or ½ cup of cooked rice, pasta, or cooked cereal. Try to choose whole-grain products as much as possible. · Limit lean meat, poultry, and fish to 2 servings each day. A serving is 3 ounces, about the size of a deck of cards. · Eat 4 to 5 servings of nuts, seeds, and legumes (cooked dried beans, lentils, and split peas) each week. A serving is 1/3 cup of nuts, 2 tablespoons of seeds, or ½ cup of cooked beans or peas. · Limit fats and oils to 2 to 3 servings each day. A serving is 1 teaspoon of vegetable oil or 2 tablespoons of salad dressing. · Limit sweets and added sugars to 5 servings or less a week. A serving is 1 tablespoon jelly or jam, ½ cup sorbet, or 1 cup of lemonade. · Eat less than 2,300 milligrams (mg) of sodium a day. If you limit your sodium to 1,500 mg a day, you can lower your blood pressure even more. Tips for success · Start small. Do not try to make dramatic changes to your diet all at once. You might feel that you are missing out on your favorite foods and then be more likely to not follow the plan. Make small changes, and stick with them. Once those changes become habit, add a few more changes. · Try some of the following: ? Make it a goal to eat a fruit or vegetable at every meal and at snacks. This will make it easy to get the recommended amount of fruits and vegetables each day. ? Try yogurt topped with fruit and nuts for a snack or healthy dessert. ? Add lettuce, tomato, cucumber, and onion to sandwiches. ? Combine a ready-made pizza crust with low-fat mozzarella cheese and lots of vegetable toppings. Try using tomatoes, squash, spinach, broccoli, carrots, cauliflower, and onions. ? Have a variety of cut-up vegetables with a low-fat dip as an appetizer instead of chips and dip. ? Sprinkle sunflower seeds or chopped almonds over salads.  Or try adding chopped walnuts or almonds to cooked vegetables. ? Try some vegetarian meals using beans and peas. Add garbanzo or kidney beans to salads. Make burritos and tacos with mashed dye beans or black beans. Where can you learn more? Go to http://dawood-vera.info/ Enter L505 in the search box to learn more about \"DASH Diet: Care Instructions. \" Current as of: December 16, 2019               Content Version: 12.6 © 7513-3362 [email or web address]. Care instructions adapted under license by [employer] (which disclaims liability or warranty for this information). If you have questions about a medical condition or this instruction, always ask your healthcare professional. [address] any warranty or liability for your use of this information.

## 2020-09-29 NOTE — TELEPHONE ENCOUNTER
Last Refill: 7-24-20  Last Visit: 9/29/2020   Next Visit: 1/4/2021     Requested Prescriptions     Pending Prescriptions Disp Refills    magnesium oxide (MAG-OX) 400 mg tablet 180 Tab 3     Sig: Take 1 tablet by mouth twice daily

## 2020-09-29 NOTE — PROGRESS NOTES
This note will not be viewable in [address] Desire Loyola is a 78 y.o. female and presents with Follow Up Chronic Condition (4 week fu)  . Subjective:  Mrs. Chantal Morgan returns to the office today in follow-up of multiple medical problems. As previously noted she has chronic systolic congestive heart failure due to an ischemic cardiomyopathy. She remains on diuretic therapy along with multiple other medications. In the last month her weight has gone down 3 months. The patient denies any increase in baseline shortness of breath and has had no PND, orthopnea. She continues to note lower extremity edema which is been less than what it was a month ago. She does have a history of chronic atrial fibrillation and has had no palpitations or syncope. She remains on Pradaxa for stroke prevention. The patient has hypertension which is been adequately controlled on her Bumex along with hydralazine, lisinopril and Toprol-XL. She denies any orthostatic dizziness and has had no muscle cramping, fatigue or palpitations. She has chronic anxiety and remains on venlafaxine along with clonazepam.  Her anxiety has been well controlled. She denies any panic attacks or side effects related to her medication usage.     Past Medical History:   Diagnosis Date    Anxiety 1/22/2018    Arthritis     OA    Asthma     Diabetes ([address]     Essential hypertension     GERD (gastroesophageal reflux disease)     Hypercholesterolemia 1/22/2018    Hypertension     Ill-defined condition     diverticulitis    Long-term use of high-risk medication 1/22/2018    Neuropathy     Obesity (BMI 30-39.9) 4/30/2019    Other ill-defined conditions(799.89)     IBS, spinal stenosis    Plantar fasciitis     Psychiatric disorder     depression, anxiety    Sleep apnea     uses CPAP    Type 2 diabetes mellitus with diabetic neuropathy, without long-term current use of insulin ([address] 6/5/2016     Past Surgical History:   Procedure Laterality Date    CARDIAC SURG PROCEDURE UNLIST      stent    COLONOSCOPY N/A 3/14/2018    COLONOSCOPY performed by Thalia Lion [facility] at [facility] ENDOSCOPY    HX APPENDECTOMY      HX CHOLECYSTECTOMY  11/15/2018    HX HYSTERECTOMY      HX PACEMAKER       Allergies   Allergen Reactions    Sulfa (Sulfonamide Antibiotics) Swelling    Amoxicillin Swelling     Current Outpatient Medications   Medication Sig Dispense Refill    cyclobenzaprine (FLEXERIL) 10 mg tablet Take 1 tablet by mouth three times daily as needed for muscle spasm 90 Tab 0    isosorbide mononitrate ER (IMDUR) 60 mg CR tablet Take 1 Tab by mouth daily. 90 Tab 3    glimepiride (AMARYL) 2 mg tablet Take 1 tablet by mouth twice daily 180 Tab 3    triamcinolone acetonide (KENALOG) 0.1 % topical cream APPLY A THIN FILM OF CREAM EXTERNALLY TO AFFECTED AREA TWICE DAILY 15 g 3    clotrimazole-betamethasone (LOTRISONE) topical cream APPLY TO THE AFFECTED AREA 2 TIMES DAILY 15 g 3    clonazePAM (KlonoPIN) 0.5 mg tablet TAKE 1 TABLET BY MOUTH ONCE DAILY AT NIGHT . DO NOT EXCEED  0.5 MG PER DAY 90 Tab 0    loratadine (Claritin) 10 mg tablet Take 10 mg by mouth two (2) times a day. Indications: Patient states she is using Claritin instead of Benadryl, is not taking Benadryl per patient.  bumetanide (BUMEX) 2 mg tablet Take 1 Tab by mouth daily. 90 Tab 1    lisinopriL (PRINIVIL, ZESTRIL) 40 mg tablet Take 0.5 Tabs by mouth daily. 30 Tab 1    magnesium oxide (MAG-OX) 400 mg tablet Take 1 tablet by mouth twice daily 60 Tab 3    mupirocin calcium (BACTROBAN) 2 % topical cream Apply  to affected area two (2) times a day. 15 g 0    omeprazole (PRILOSEC) 40 mg capsule TAKE 1 CAPSULE EVERY DAY 90 Cap 3    potassium chloride SR (KLOR-CON 10) 10 mEq tablet Take 1 Tab by mouth three (3) times daily. 540 Tab 3    hydrALAZINE (APRESOLINE) 25 mg tablet Take 25 mg by mouth two (2) times a day.       calcium carb/vit D2/minerals (CALTRATE PLUS PO) Take 500 mg by mouth daily.  SYMBICORT 160-4.5 mcg/actuation HFAA INHALE 2 PUFFS BY MOUTH TWICE DAILY 1 Inhaler 3    albuterol (PROVENTIL HFA, VENTOLIN HFA, PROAIR HFA) 90 mcg/actuation inhaler Take 1 Puff by inhalation every four (4) hours as needed for Wheezing. 1 Inhaler 6    diphenhydrAMINE (BENADRYL) 25 mg capsule Take 25 mg by mouth two (2) times a day.  nystatin (MYCOSTATIN) topical cream Apply  to affected area two (2) times daily as needed for Skin Irritation (Rash).  traMADol (ULTRAM) 50 mg tablet Take 50 mg by mouth daily as needed for Pain (Used to supplement the Lyrica when lyrica doesnt manage the pain on its own).  dabigatran etexilate (PRADAXA) 150 mg capsule Take 1 Cap by mouth two (2) times a day. IF NO BLEEDING AT CATH SITE. 60 Cap 12    amLODIPine (NORVASC) 2.5 mg tablet Take 1 Tab by mouth daily. (Patient taking differently: Take 10 mg by mouth daily.) 90 Tab 3    metoprolol succinate (TOPROL-XL) 100 mg tablet Take 1 Tab by mouth daily. 30 Tab prn    acetaminophen (TYLENOL EXTRA STRENGTH) 500 mg tablet Take 1,000 mg by mouth every eight (8) hours as needed for Pain (Headache).  lidocaine (ASPERCREME, LIDOCAINE,) 4 % topical cream Apply  to affected area daily as needed for Pain (Knee pain).  sodium chloride (AYR SALINE) 0.65 % nasal squeeze bottle 2 Sprays by Both Nostrils route every eight (8) hours as needed.  conjugated estrogens (PREMARIN) 0.625 mg/gram vaginal cream Insert 0.5 g into vagina two (2) times a week. Tuesdays and Thursdays       venlafaxine-SR (EFFEXOR XR) 150 mg capsule Take 150 mg by mouth two (2) times daily (with meals).  pregabalin (LYRICA) 200 mg capsule Take 300 mg by mouth two (2) times a day.  cholecalciferol, vitamin d3, (VITAMIN D3) 400 unit cap Take 400 Units by mouth daily.  aspirin 81 mg chewable tablet Take 81 mg by mouth daily.  atorvastatin (LIPITOR) 40 mg tablet Take 1 Tab by mouth nightly.  30 Tab 12     Social History     Socioeconomic History    Marital status:      Spouse name: Not on file    Number of children: Not on file    Years of education: Not on file    Highest education level: Not on file   Tobacco Use    Smoking status: Never Smoker    Smokeless tobacco: Never Used   Substance and Sexual Activity    Alcohol use: No    Drug use: No     Family History   Problem Relation Age of Onset    Arthritis-osteo Mother     Hypertension Mother     High Cholesterol Mother     Crohn's Disease Mother     Heart Disease Mother     Alcohol abuse Father     High Cholesterol Sister     Hypertension Sister     Thyroid Disease Sister     COPD Sister     High Cholesterol Brother     Hypertension Brother     COPD Brother     COPD Child     Inflammatory Bowel Dz Child        Health Maintenance   Topic Date Due    DTaP/Tdap/Td series (1 - Tdap) 09/13/1973    Shingrix Vaccine Age 50> (1 of 2) 09/13/2002    Breast Cancer Screen Mammogram  04/04/2020    Flu Vaccine (1) 09/01/2020    Foot Exam Q1  10/29/2020    Pneumococcal 65+ years (2 of 2 - PPSV23) 10/11/2020    Medicare Yearly Exam  05/01/2021    MICROALBUMIN Q1  05/01/2021    A1C test (Diabetic or Prediabetic)  08/10/2021    Lipid Screen  08/10/2021    GLAUCOMA SCREENING Q2Y  12/11/2021    Eye Exam Retinal or Dilated  12/11/2021    Colonoscopy  03/14/2028    Hepatitis C Screening  Completed    Bone Densitometry (Dexa) Screening  Completed        Review of Systems  Constitutional: negative for fevers, chills, anorexia and weight loss  Eyes:   negative for visual disturbance and irritation  ENT:   negative for tinnitus,sore throat,nasal congestion,ear pain,hoarseness  Respiratory:  negative for cough, hemoptysis, dyspnea,wheezing  CV:   negative for chest pain, palpitations.   Positive for chronic lower extremity edema   GI:   negative for nausea, vomiting, diarrhea, abdominal pain,melena  Endo:               negative for polyuria,polydipsia,polyphagia,heat intolerance  Genitourinary: negative for frequency, dysuria and hematuria  Integumentary: negative for rash and pruritus  Hematologic:  negative for easy bruising and gum/nose bleeding  Musculoskel: negative for myalgias, arthralgias, back pain, muscle weakness, joint pain  Neurological:  negative for headaches, dizziness, vertigo, memory problems and gait   Behavl/Psych: negative for feelings of anxiety, depression, mood changes  ROS otherwise negative      Objective:  Visit Vitals  /70 (BP 1 Location: Left arm, BP Patient Position: Sitting)   Pulse 81   Temp 98 °F (36.7 °C) (Oral)   Resp 18   Ht 5' 4\" (1.626 m)   Wt 279 lb (126.6 kg)   SpO2 96%   BMI 47.89 kg/m²     Body mass index is 47.89 kg/m². Physical Exam:   General appearance - alert, well appearing, and in no distress  Mental status - alert, oriented to person, place, and time  EYE-VIVEK, EOMI,conjunctiva normal bilaterally, lids normal  ENT-ENT exam normal, no neck nodes or sinus tenderness  Nose - normal and patent, no erythema,  Or discharge   Mouth - mucous membranes moist, pharynx normal without lesions  Neck - supple, no significant adenopathy or bruit  Chest - clear to auscultation, no wheezes, rales or rhonchi. Heart -heart rate is slightly irregular but controlled. No murmur is heard   abdomen - soft, nontender, nondistended, no masses or organomegaly  Lymph- no adenopathy palpable  Ext-3+ brawny edema of the lower extremities is noted without skin breakdown. Skin-Warm and dry.  no hyperpigmentation, vitiligo, or suspicious lesions  Neuro -alert, oriented, normal speech, no focal findings or movement disorder noted      Assessment/Plan:  Diagnoses and all orders for this visit:    Systolic CHF, chronic (HCC)    Ischemic cardiomyopathy    Anxiety    Essential hypertension  -     COLLECTION VENOUS BLOOD,VENIPUNCTURE  -     METABOLIC PANEL, BASIC    Other orders  -     cyclobenzaprine (FLEXERIL) 10 mg tablet; Take 1 tablet by mouth three times daily as needed for muscle spasm, Normal, Disp-90 Tab,R-0  -     isosorbide mononitrate ER (IMDUR) 60 mg CR tablet; Take 1 Tab by mouth daily. , Normal, Disp-90 Tab,R-3  -     glimepiride (AMARYL) 2 mg tablet; Take 1 tablet by mouth twice daily, Normal, Disp-180 Tab,R-3  -     triamcinolone acetonide (KENALOG) 0.1 % topical cream; APPLY A THIN FILM OF CREAM EXTERNALLY TO AFFECTED AREA TWICE DAILY, Normal, Disp-15 g,R-3  -     clotrimazole-betamethasone (LOTRISONE) topical cream; APPLY TO THE AFFECTED AREA 2 TIMES DAILY, Normal, Disp-15 g,R-3        Other instructions: The patient's medications were reviewed and reconciled. No change in her current medical regimen will be made. A no added salt diet is endorsed    Follow-up electrolytes and renal function will be obtained today    She is doing relatively well with a 3 pound weight loss over the last month and no change in her current regimen will be made. An influenza vaccination will be obtained in October    Follow-up in 3 months    Follow-up and Dispositions    · Return in about 3 months (around 12/29/2020). I have reviewed with the patient details of the assessment and plan and all questions were answered. Relevent patient education was performed. The most recent lab findings were reviewed with the patient. An After Visit Summary was printed and given to the patient. Petey Bose MD    Please note that this dictation was completed with [email or web address], the computer voice recognition software. Quite often unanticipated grammatical, syntax, homophones, and other interpretive errors are inadvertently transcribed by the computer software. Please disregard these errors. Please excuse any errors that have escaped final proofreading.

## 2020-09-29 NOTE — PROGRESS NOTES
Dax Ha is a 78 y.o. female presenting for Follow Up Chronic Condition (4 week fu)  . 1. Have you been to the ER, urgent care clinic since your last visit? Hospitalized since your last visit? No    2. Have you seen or consulted any other health care providers outside of the [address] [place] [address] since your last visit? Include any pap smears or colon screening. Dr Tracey Huff, Dr Neymar Pires, last 12 mths 8/13/2020   Able to walk? Yes   Fall in past 12 months? No   Fall with injury? -   Number of falls in past 12 months -   Fall Risk Score -         Abuse Screening Questionnaire 4/30/2020   Do you ever feel afraid of your partner? N   Are you in a relationship with someone who physically or mentally threatens you? N   Is it safe for you to go home? Y       3 most recent PHQ Screens 4/30/2020   Little interest or pleasure in doing things Not at all   Feeling down, depressed, irritable, or hopeless Not at all   Total Score PHQ 2 0   Trouble falling or staying asleep, or sleeping too much Not at all   Feeling tired or having little energy Not at all   Poor appetite, weight loss, or overeating Not at all   Feeling bad about yourself - or that you are a failure or have let yourself or your family down Not at all   Trouble concentrating on things such as school, work, reading, or watching TV Not at all   Moving or speaking so slowly that other people could have noticed; or the opposite being so fidgety that others notice Not at all   Thoughts of being better off dead, or hurting yourself in some way Not at all   PHQ 9 Score 0   How difficult have these problems made it for you to do your work, take care of your home and get along with others Not difficult at all       There are no discontinued medications.

## 2020-10-07 RX ORDER — ISOSORBIDE MONONITRATE 60 MG/1
60 TABLET, EXTENDED RELEASE ORAL DAILY
Qty: 90 TAB | Refills: 3 | Status: SHIPPED | OUTPATIENT
Start: 2020-10-07 | End: 2021-09-24

## 2020-10-07 RX ORDER — CYCLOBENZAPRINE HCL 10 MG
TABLET ORAL
Qty: 90 TAB | Refills: 0 | Status: SHIPPED | OUTPATIENT
Start: 2020-10-07 | End: 2021-01-28

## 2020-10-07 RX ORDER — GLIMEPIRIDE 2 MG/1
TABLET ORAL
Qty: 180 TAB | Refills: 3 | Status: SHIPPED | OUTPATIENT
Start: 2020-10-07 | End: 2021-01-07

## 2020-10-07 NOTE — TELEPHONE ENCOUNTER
Requested Prescriptions     Pending Prescriptions Disp Refills    isosorbide mononitrate ER (IMDUR) 60 mg CR tablet 90 Tab 3     Sig: Take 1 Tab by mouth daily.     cyclobenzaprine (FLEXERIL) 10 mg tablet 90 Tab 0     Sig: Take 1 tablet by mouth three times daily as needed for muscle spasm    glimepiride (AMARYL) 2 mg tablet 180 Tab 3     Sig: Take 1 tablet by mouth twice daily       Last Refill: 9/29/20  Next Appointment:1/4/21

## 2020-10-08 RX ORDER — CLOTRIMAZOLE AND BETAMETHASONE DIPROPIONATE 10; .64 MG/G; MG/G
CREAM TOPICAL
Qty: 15 G | Refills: 3 | Status: SHIPPED | OUTPATIENT
Start: 2020-10-08 | End: 2021-03-08 | Stop reason: ALTCHOICE

## 2020-10-08 RX ORDER — TRIAMCINOLONE ACETONIDE 1 MG/G
CREAM TOPICAL
Qty: 15 G | Refills: 3 | Status: SHIPPED | OUTPATIENT
Start: 2020-10-08 | End: 2021-08-04

## 2020-10-28 ENCOUNTER — OFFICE VISIT (OUTPATIENT)
Dept: INTERNAL MEDICINE CLINIC | Age: 70
End: 2020-10-28
Payer: MEDICARE

## 2020-10-28 VITALS
RESPIRATION RATE: 20 BRPM | BODY MASS INDEX: 47.12 KG/M2 | HEIGHT: 64 IN | HEART RATE: 81 BPM | SYSTOLIC BLOOD PRESSURE: 122 MMHG | TEMPERATURE: 98.6 F | WEIGHT: 276 LBS | DIASTOLIC BLOOD PRESSURE: 72 MMHG | OXYGEN SATURATION: 96 %

## 2020-10-28 DIAGNOSIS — E11.621 DIABETIC ULCER OF LEFT MIDFOOT ASSOCIATED WITH TYPE 2 DIABETES MELLITUS, UNSPECIFIED ULCER STAGE (HCC): Chronic | ICD-10-CM

## 2020-10-28 DIAGNOSIS — L97.429 DIABETIC ULCER OF LEFT MIDFOOT ASSOCIATED WITH TYPE 2 DIABETES MELLITUS, UNSPECIFIED ULCER STAGE (HCC): Chronic | ICD-10-CM

## 2020-10-28 DIAGNOSIS — E11.40 TYPE 2 DIABETES MELLITUS WITH DIABETIC NEUROPATHY, WITHOUT LONG-TERM CURRENT USE OF INSULIN (HCC): Primary | Chronic | ICD-10-CM

## 2020-10-28 DIAGNOSIS — I87.2 VENOUS STASIS DERMATITIS OF BOTH LOWER EXTREMITIES: ICD-10-CM

## 2020-10-28 PROCEDURE — G8427 DOCREV CUR MEDS BY ELIG CLIN: HCPCS | Performed by: INTERNAL MEDICINE

## 2020-10-28 PROCEDURE — G8752 SYS BP LESS 140: HCPCS | Performed by: INTERNAL MEDICINE

## 2020-10-28 PROCEDURE — G8399 PT W/DXA RESULTS DOCUMENT: HCPCS | Performed by: INTERNAL MEDICINE

## 2020-10-28 PROCEDURE — 1101F PT FALLS ASSESS-DOCD LE1/YR: CPT | Performed by: INTERNAL MEDICINE

## 2020-10-28 PROCEDURE — G9717 DOC PT DX DEP/BP F/U NT REQ: HCPCS | Performed by: INTERNAL MEDICINE

## 2020-10-28 PROCEDURE — 3052F HG A1C>EQUAL 8.0%<EQUAL 9.0%: CPT | Performed by: INTERNAL MEDICINE

## 2020-10-28 PROCEDURE — 99214 OFFICE O/P EST MOD 30 MIN: CPT | Performed by: INTERNAL MEDICINE

## 2020-10-28 PROCEDURE — G8536 NO DOC ELDER MAL SCRN: HCPCS | Performed by: INTERNAL MEDICINE

## 2020-10-28 PROCEDURE — 2022F DILAT RTA XM EVC RTNOPTHY: CPT | Performed by: INTERNAL MEDICINE

## 2020-10-28 PROCEDURE — 3017F COLORECTAL CA SCREEN DOC REV: CPT | Performed by: INTERNAL MEDICINE

## 2020-10-28 PROCEDURE — G8754 DIAS BP LESS 90: HCPCS | Performed by: INTERNAL MEDICINE

## 2020-10-28 PROCEDURE — G8417 CALC BMI ABV UP PARAM F/U: HCPCS | Performed by: INTERNAL MEDICINE

## 2020-10-28 PROCEDURE — 1090F PRES/ABSN URINE INCON ASSESS: CPT | Performed by: INTERNAL MEDICINE

## 2020-10-28 NOTE — PROGRESS NOTES
Nivia Kanner is a 78 y.o. female presenting for Form Completion  . 1. Have you been to the ER, urgent care clinic since your last visit? Hospitalized since your last visit? No    2. Have you seen or consulted any other health care providers outside of the [address] [place] [address] since your last visit? Include any pap smears or colon screening. Ortho    Fall Risk Assessment, last 12 mths 8/13/2020   Able to walk? Yes   Fall in past 12 months? No   Fall with injury? -   Number of falls in past 12 months -   Fall Risk Score -         Abuse Screening Questionnaire 4/30/2020   Do you ever feel afraid of your partner? N   Are you in a relationship with someone who physically or mentally threatens you? N   Is it safe for you to go home? Y       3 most recent PHQ Screens 4/30/2020   Little interest or pleasure in doing things Not at all   Feeling down, depressed, irritable, or hopeless Not at all   Total Score PHQ 2 0   Trouble falling or staying asleep, or sleeping too much Not at all   Feeling tired or having little energy Not at all   Poor appetite, weight loss, or overeating Not at all   Feeling bad about yourself - or that you are a failure or have let yourself or your family down Not at all   Trouble concentrating on things such as school, work, reading, or watching TV Not at all   Moving or speaking so slowly that other people could have noticed; or the opposite being so fidgety that others notice Not at all   Thoughts of being better off dead, or hurting yourself in some way Not at all   PHQ 9 Score 0   How difficult have these problems made it for you to do your work, take care of your home and get along with others Not difficult at all       There are no discontinued medications.

## 2020-10-28 NOTE — PROGRESS NOTES
Subjective:     Mrs. Reanna Faulkner is a 31-year-old female who presents the office today for diabetic foot exam.  The patient has type 2 diabetes mellitus that is been complicated by peripheral neuropathy. She currently is on glimepiride. Her last A1c was 8.4 done 2 months ago. The patient checks her blood sugars daily and denies any hypoglycemia. She is up-to-date on diabetic retinal eye exam.  She routinely sees a podiatrist for her foot care. The patient would like to have diabetic shoes as she has multiple risk factors for her feet. In addition to her neuropathy the patient has chronic venous stasis of both lower extremities and has had a history of recurrent cellulitis of the lower extremities. The patient is also had a prior diabetic ulcer of the left midfoot. This took a long time to heal but she continues to follow preventative care.     Past Medical History:   Diagnosis Date    Anxiety 1/22/2018    Arthritis     OA    Asthma     Diabetes ([address]     Essential hypertension     GERD (gastroesophageal reflux disease)     Hypercholesterolemia 1/22/2018    Hypertension     Ill-defined condition     diverticulitis    Long-term use of high-risk medication 1/22/2018    Neuropathy     Obesity (BMI 30-39.9) 4/30/2019    Other ill-defined conditions(799.89)     IBS, spinal stenosis    Plantar fasciitis     Psychiatric disorder     depression, anxiety    Sleep apnea     uses CPAP    Type 2 diabetes mellitus with diabetic neuropathy, without long-term current use of insulin ([address] 6/5/2016     Past Surgical History:   Procedure Laterality Date    CARDIAC SURG PROCEDURE UNLIST      stent    COLONOSCOPY N/A 3/14/2018    COLONOSCOPY performed by Brenden Cannon MD at [facility] ENDOSCOPY    HX APPENDECTOMY      HX CHOLECYSTECTOMY  11/15/2018    HX HYSTERECTOMY      HX PACEMAKER       Allergies   Allergen Reactions    Sulfa (Sulfonamide Antibiotics) Swelling    Amoxicillin Swelling     Current Outpatient Medications   Medication Sig Dispense Refill    triamcinolone acetonide (KENALOG) 0.1 % topical cream APPLY A THIN FILM OF CREAM EXTERNALLY TO AFFECTED AREA TWICE DAILY 15 g 3    clotrimazole-betamethasone (LOTRISONE) topical cream APPLY TO THE AFFECTED AREA 2 TIMES DAILY 15 g 3    isosorbide mononitrate ER (IMDUR) 60 mg CR tablet Take 1 Tab by mouth daily. 90 Tab 3    cyclobenzaprine (FLEXERIL) 10 mg tablet Take 1 tablet by mouth three times daily as needed for muscle spasm 90 Tab 0    glimepiride (AMARYL) 2 mg tablet Take 1 tablet by mouth twice daily 180 Tab 3    magnesium oxide (MAG-OX) 400 mg tablet Take 1 tablet by mouth twice daily 180 Tab 3    clonazePAM (KlonoPIN) 0.5 mg tablet TAKE 1 TABLET BY MOUTH ONCE DAILY AT NIGHT . DO NOT EXCEED  0.5 MG PER DAY 90 Tab 0    loratadine (Claritin) 10 mg tablet Take 10 mg by mouth two (2) times a day. Indications: Patient states she is using Claritin instead of Benadryl, is not taking Benadryl per patient.  bumetanide (BUMEX) 2 mg tablet Take 1 Tab by mouth daily. 90 Tab 1    lisinopriL (PRINIVIL, ZESTRIL) 40 mg tablet Take 0.5 Tabs by mouth daily. 30 Tab 1    mupirocin calcium (BACTROBAN) 2 % topical cream Apply  to affected area two (2) times a day. 15 g 0    omeprazole (PRILOSEC) 40 mg capsule TAKE 1 CAPSULE EVERY DAY 90 Cap 3    potassium chloride SR (KLOR-CON 10) 10 mEq tablet Take 1 Tab by mouth three (3) times daily. 540 Tab 3    hydrALAZINE (APRESOLINE) 25 mg tablet Take 25 mg by mouth two (2) times a day.  calcium carb/vit D2/minerals (CALTRATE PLUS PO) Take 500 mg by mouth daily.  SYMBICORT 160-4.5 mcg/actuation HFAA INHALE 2 PUFFS BY MOUTH TWICE DAILY 1 Inhaler 3    albuterol (PROVENTIL HFA, VENTOLIN HFA, PROAIR HFA) 90 mcg/actuation inhaler Take 1 Puff by inhalation every four (4) hours as needed for Wheezing. 1 Inhaler 6    diphenhydrAMINE (BENADRYL) 25 mg capsule Take 25 mg by mouth two (2) times a day.       nystatin (MYCOSTATIN) topical cream Apply  to affected area two (2) times daily as needed for Skin Irritation (Rash).  traMADol (ULTRAM) 50 mg tablet Take 50 mg by mouth daily as needed for Pain (Used to supplement the Lyrica when lyrica doesnt manage the pain on its own).  dabigatran etexilate (PRADAXA) 150 mg capsule Take 1 Cap by mouth two (2) times a day. IF NO BLEEDING AT CATH SITE. 60 Cap 12    amLODIPine (NORVASC) 2.5 mg tablet Take 1 Tab by mouth daily. (Patient taking differently: Take 10 mg by mouth daily.) 90 Tab 3    metoprolol succinate (TOPROL-XL) 100 mg tablet Take 1 Tab by mouth daily. 30 Tab prn    acetaminophen (TYLENOL EXTRA STRENGTH) 500 mg tablet Take 1,000 mg by mouth every eight (8) hours as needed for Pain (Headache).  lidocaine (ASPERCREME, LIDOCAINE,) 4 % topical cream Apply  to affected area daily as needed for Pain (Knee pain).  sodium chloride (AYR SALINE) 0.65 % nasal squeeze bottle 2 Sprays by Both Nostrils route every eight (8) hours as needed.  conjugated estrogens (PREMARIN) 0.625 mg/gram vaginal cream Insert 0.5 g into vagina two (2) times a week. Tuesdays and Thursdays       venlafaxine-SR (EFFEXOR XR) 150 mg capsule Take 150 mg by mouth two (2) times daily (with meals).  pregabalin (LYRICA) 200 mg capsule Take 300 mg by mouth two (2) times a day.  cholecalciferol, vitamin d3, (VITAMIN D3) 400 unit cap Take 400 Units by mouth daily.  aspirin 81 mg chewable tablet Take 81 mg by mouth daily.  atorvastatin (LIPITOR) 40 mg tablet Take 1 Tab by mouth nightly.  27 Tab 12     Social History     Socioeconomic History    Marital status:      Spouse name: Not on file    Number of children: Not on file    Years of education: Not on file    Highest education level: Not on file   Tobacco Use    Smoking status: Never Smoker    Smokeless tobacco: Never Used   Substance and Sexual Activity    Alcohol use: No    Drug use: No     Family History Problem Relation Age of Onset   [facility] Arthritis-osteo Mother     Hypertension Mother     High Cholesterol Mother     Crohn's Disease Mother     Heart Disease Mother     Alcohol abuse Father     High Cholesterol Sister     Hypertension Sister     Thyroid Disease Sister     COPD Sister     High Cholesterol Brother     Hypertension Brother     COPD Brother     COPD Child     Inflammatory Bowel Dz Child        Review of Systems:  GEN: no weight loss, weight gain, fatigue or night sweats  CV: no PND, orthopnea, or palpitations  Resp: no dyspnea on exertion, no cough  Abd: no nausea, vomiting or diarrhea  EXT: Positive for chronic edema of both lower extremities  Endocrine: no hair loss, excessive thirst or polyuria  Neurological ROS: Positive for numbness and burning of both feet to the ankle region  ROS otherwise negative      Objective:     Visit Vitals  /72 (BP 1 Location: Right arm, BP Patient Position: Sitting)   Pulse 81   Temp 98.6 °F (37 °C) (Oral)   Resp 20   Ht 5' 4\" (1.626 m)   Wt 276 lb (125.2 kg)   SpO2 96%   BMI 47.38 kg/m²     Body mass index is 47.38 kg/m². General:   alert, cooperative and no distress   Heart: S1 and S2 normal,no murmurs noted    Lungs: Clear to auscultation bilaterally, no increased work of breathing   Abdomen: Soft, nontender.   Normal bowel sounds   Extremities:  Positive for chronic lymphedema of both lower extremities without recurrent cellulitis   Neuro: ..alert, oriented x3,speech normal in context and clarity, cranial nerves II-XII intact,motor strength: full proximally and distally,gait: normal  reflexes: full and symmetric   Diabetic foot exam:     Left Foot:   Visual Exam: Bunion deformity present   Pulse DP: 1+ (weak)   Filament test: absent sensation    Vibratory sensation: absent      Right Foot:   Visual Exam: Bunion deformity present   Pulse DP: 1+ (weak)   Filament test: absent sensation    Vibratory sensation: absent      Physical exam otherwise negative         Assessment/Plan:     Diagnoses and all orders for this visit:    Type 2 diabetes mellitus with diabetic neuropathy, without long-term current use of insulin ([facility])  -      DIABETES FOOT EXAM    Venous stasis dermatitis of both lower extremities    Diabetic ulcer of left midfoot associated with type 2 diabetes mellitus, unspecified ulcer stage ([address]        Other instructions: The patient's medications were reviewed and reconciled. No change in her current medical regimen will be made. The patient has peripheral neuropathy related to diabetes involving both feet along with poor circulation and foot deformity. She also has a prior history of cellulitis of the lower extremities and a previous diabetic foot ulcer. She does see a podiatrist in order to have calluses shaved along the balls of her feet on a routine basis. The patient requires diabetic shoes for prevention of further problems related to the above. And forms will be completed today. Follow-up here as previously scheduled        Andressa Mcpherson MD    Please note that this dictation was completed with [employer], the computer voice recognition software. Quite often unanticipated grammatical, syntax, homophones, and other interpretive errors are inadvertently transcribed by the computer software. Please disregard these errors. Please excuse any errors that have escaped final proofreading.

## 2020-10-28 NOTE — PATIENT INSTRUCTIONS

## 2020-11-20 ENCOUNTER — OFFICE VISIT (OUTPATIENT)
Dept: URGENT CARE | Age: 70
End: 2020-11-20
Payer: MEDICARE

## 2020-11-20 VITALS — RESPIRATION RATE: 18 BRPM | HEART RATE: 80 BPM | OXYGEN SATURATION: 95 % | TEMPERATURE: 98.5 F

## 2020-11-20 DIAGNOSIS — Z20.822 ENCOUNTER FOR LABORATORY TESTING FOR COVID-19 VIRUS: Primary | ICD-10-CM

## 2020-11-20 PROCEDURE — 1101F PT FALLS ASSESS-DOCD LE1/YR: CPT | Performed by: EMERGENCY MEDICINE

## 2020-11-20 PROCEDURE — G8756 NO BP MEASURE DOC: HCPCS | Performed by: EMERGENCY MEDICINE

## 2020-11-20 PROCEDURE — G8417 CALC BMI ABV UP PARAM F/U: HCPCS | Performed by: EMERGENCY MEDICINE

## 2020-11-20 PROCEDURE — G8536 NO DOC ELDER MAL SCRN: HCPCS | Performed by: EMERGENCY MEDICINE

## 2020-11-20 PROCEDURE — 99203 OFFICE O/P NEW LOW 30 MIN: CPT | Performed by: EMERGENCY MEDICINE

## 2020-11-20 PROCEDURE — G9717 DOC PT DX DEP/BP F/U NT REQ: HCPCS | Performed by: EMERGENCY MEDICINE

## 2020-11-20 PROCEDURE — G8399 PT W/DXA RESULTS DOCUMENT: HCPCS | Performed by: EMERGENCY MEDICINE

## 2020-11-20 PROCEDURE — 1090F PRES/ABSN URINE INCON ASSESS: CPT | Performed by: EMERGENCY MEDICINE

## 2020-11-20 PROCEDURE — 3017F COLORECTAL CA SCREEN DOC REV: CPT | Performed by: EMERGENCY MEDICINE

## 2020-11-20 PROCEDURE — G8427 DOCREV CUR MEDS BY ELIG CLIN: HCPCS | Performed by: EMERGENCY MEDICINE

## 2020-11-20 NOTE — PROGRESS NOTES
Pt here with no known COVID exposures and no sx. This patient was seen in [facility] at [address] [place] [address] Urgent Care while outdoors at their vehicle due to COVID-19 pandemic with PPE and focused examination in order to decrease community viral transmission    had an exposure but he has no sx    The history is provided by the patient.         Past Medical History:   Diagnosis Date    Anxiety 1/22/2018    Arthritis     OA    Asthma     Diabetes ([facility]     Essential hypertension     GERD (gastroesophageal reflux disease)     Hypercholesterolemia 1/22/2018    Hypertension     Ill-defined condition     diverticulitis    Long-term use of high-risk medication 1/22/2018    Neuropathy     Obesity (BMI 30-39.9) 4/30/2019    Other ill-defined conditions(799.89)     IBS, spinal stenosis    Plantar fasciitis     Psychiatric disorder     depression, anxiety    Sleep apnea     uses CPAP    Type 2 diabetes mellitus with diabetic neuropathy, without long-term current use of insulin ([facility] 6/5/2016        Past Surgical History:   Procedure Laterality Date    CARDIAC SURG PROCEDURE UNLIST      stent    COLONOSCOPY N/A 3/14/2018    COLONOSCOPY performed by Nelma Ormond, [facility] at [facility] ENDOSCOPY    HX APPENDECTOMY      HX CHOLECYSTECTOMY  11/15/2018    HX HYSTERECTOMY      HX PACEMAKER           Family History   Problem Relation Age of Onset    Arthritis-osteo Mother     Hypertension Mother     High Cholesterol Mother     Crohn's Disease Mother     Heart Disease Mother     Alcohol abuse Father     High Cholesterol Sister     Hypertension Sister     Thyroid Disease Sister     COPD Sister     High Cholesterol Brother     Hypertension Brother     COPD Brother     COPD Child     Inflammatory Bowel Dz Child         Social History     Socioeconomic History    Marital status:      Spouse name: Not on file    Number of children: Not on file    Years of education: Not on file   [facility] education level: Not on file   Occupational History    Not on file   Social Needs    Financial resource strain: Not on file    Food insecurity     Worry: Not on file     Inability: Not on file    Transportation needs     Medical: Not on file     Non-medical: Not on file   Tobacco Use    Smoking status: Never Smoker    Smokeless tobacco: Never Used   Substance and Sexual Activity    Alcohol use: No    Drug use: No    Sexual activity: Not on file   Lifestyle    Physical activity     Days per week: Not on file     Minutes per session: Not on file    Stress: Not on file   Relationships    Social connections     Talks on phone: Not on file     Gets together: Not on file     Attends [employer] service: Not on file     Active member of club or organization: Not on file     Attends meetings of clubs or organizations: Not on file     Relationship status: Not on file    Intimate partner violence     Fear of current or ex partner: Not on file     Emotionally abused: Not on file     Physically abused: Not on file     Forced sexual activity: Not on file   Other Topics Concern    Not on file   Social History Narrative    Not on file                ALLERGIES: Sulfa (sulfonamide antibiotics) and Amoxicillin    Review of Systems   Constitutional: Negative for chills and fever. HENT: Negative for congestion, sinus pressure, sneezing and sore throat. Respiratory: Negative for cough and shortness of breath. Cardiovascular: Negative for chest pain. Gastrointestinal: Negative for abdominal pain, diarrhea, nausea and vomiting. Musculoskeletal: Negative for myalgias. Neurological: Negative for headaches. Vitals:    11/20/20 1316   Pulse: 80   Resp: 18   Temp: 98.5 °F (36.9 °C)   SpO2: 95%       Physical Exam  Vitals signs and nursing note reviewed. Constitutional:       General: She is not in acute distress. Appearance: Normal appearance. She is obese. She is not ill-appearing or toxic-appearing. Comments: Pt examined in a vehicle  Well appearing   HENT:      Nose: No rhinorrhea. Mouth/Throat:      Mouth: Mucous membranes are moist.      Pharynx: Oropharynx is clear. No oropharyngeal exudate or posterior oropharyngeal erythema. Cardiovascular:      Rate and Rhythm: Normal rate and regular rhythm. Heart sounds: Normal heart sounds. Pulmonary:      Effort: Pulmonary effort is normal. No respiratory distress. Breath sounds: Normal breath sounds. No stridor. No wheezing, rhonchi or rales. Abdominal:      General: Bowel sounds are normal.   Skin:     Findings: No rash. Neurological:      Mental Status: She is alert and oriented to person, place, and time. Psychiatric:         Mood and Affect: Mood normal.         Behavior: Behavior normal.         MDM    ICD-10-CM ICD-9-CM    1. Encounter for laboratory testing for COVID-19 virus  Z20.828 V01.79 NOVEL CORONAVIRUS (COVID-19)     No orders of the defined types were placed in this encounter. The patient's condition and possible alternative diagnoses were discussed with the patient and they verbalized understanding. The patient is to follow up with their primary care doctor for continued care. If signs and symptoms persist or become worse or new symptoms develop, the pt is to go immediately to the emergency department. Any new medications that may have been written for should be taken as directed but should always be discussed with the primary care physician and pharmacist. This was communicated to the patient. Pt instructed to quarantine until COVID testing results are back and then duration of quarantine will depend on result, current recommendations and symptoms. The patient is to get immediate re-evaluation for any new or worsening symptoms. They are to quarantine from other household members. It was recommended they stay hydrated and practice deep breathing exercises.          Procedures

## 2020-11-22 DIAGNOSIS — F41.9 ANXIETY: ICD-10-CM

## 2020-11-23 LAB — SARS-COV-2, NAA: NOT DETECTED

## 2020-11-23 RX ORDER — CLONAZEPAM 0.5 MG/1
TABLET ORAL
Qty: 90 TAB | Refills: 0 | Status: SHIPPED | OUTPATIENT
Start: 2020-11-23 | End: 2021-03-29

## 2020-12-10 ENCOUNTER — HOSPITAL ENCOUNTER (OUTPATIENT)
Dept: INFUSION THERAPY | Age: 70
Discharge: HOME | End: 2020-12-10

## 2020-12-10 VITALS
SYSTOLIC BLOOD PRESSURE: 124 MMHG | BODY MASS INDEX: 48.32 KG/M2 | TEMPERATURE: 98 F | RESPIRATION RATE: 18 BRPM | HEART RATE: 80 BPM | WEIGHT: 281.5 LBS | DIASTOLIC BLOOD PRESSURE: 76 MMHG

## 2020-12-10 NOTE — PROGRESS NOTES
[facility] Visit Note    9116 Pt arrived at [facility] ambulatory and in no distress for Prolia. Pt has appointment with oral surgeon for two teeth to be extracted. Prolia held. Pt instructed to schedule appointment after being cleared by oral surgeon, 6-8 weeks post procedure at a minimum. Patient Vitals for the past 12 hrs:   Temp Pulse Resp BP   12/10/20 1130 98 °F (36.7 °C) 80 18 124/76       Medications received:  Held    1145 Pt d/c home and instructed to schedule appointment per instructions above.

## 2020-12-13 ENCOUNTER — APPOINTMENT (OUTPATIENT)
Dept: CT IMAGING | Age: 70
DRG: 515 | End: 2020-12-13
Attending: STUDENT IN AN ORGANIZED HEALTH CARE EDUCATION/TRAINING PROGRAM
Payer: MEDICARE

## 2020-12-13 ENCOUNTER — APPOINTMENT (OUTPATIENT)
Dept: GENERAL RADIOLOGY | Age: 70
DRG: 515 | End: 2020-12-13
Attending: STUDENT IN AN ORGANIZED HEALTH CARE EDUCATION/TRAINING PROGRAM
Payer: MEDICARE

## 2020-12-13 ENCOUNTER — HOSPITAL ENCOUNTER (INPATIENT)
Age: 70
LOS: 11 days | Discharge: HOME HEALTH | DRG: 515 | End: 2020-12-24
Attending: STUDENT IN AN ORGANIZED HEALTH CARE EDUCATION/TRAINING PROGRAM | Admitting: STUDENT IN AN ORGANIZED HEALTH CARE EDUCATION/TRAINING PROGRAM
Payer: MEDICARE

## 2020-12-13 DIAGNOSIS — G89.29 CHRONIC BILATERAL LOW BACK PAIN WITH RIGHT-SIDED SCIATICA: ICD-10-CM

## 2020-12-13 DIAGNOSIS — M54.41 CHRONIC BILATERAL LOW BACK PAIN WITH RIGHT-SIDED SCIATICA: ICD-10-CM

## 2020-12-13 DIAGNOSIS — J96.01 ACUTE RESPIRATORY FAILURE WITH HYPOXIA (HCC): Primary | ICD-10-CM

## 2020-12-13 PROBLEM — M54.50 LOWER BACK PAIN: Status: ACTIVE | Noted: 2020-12-13

## 2020-12-13 PROBLEM — R09.02 HYPOXIA: Status: ACTIVE | Noted: 2020-12-13

## 2020-12-13 LAB
ALBUMIN SERPL-MCNC: 3.1 G/DL (ref 3.5–5)
ALBUMIN/GLOB SERPL: 0.8 {RATIO} (ref 1.1–2.2)
ALP SERPL-CCNC: 168 U/L (ref 45–117)
ALT SERPL-CCNC: 20 U/L (ref 12–78)
ANION GAP SERPL CALC-SCNC: 2 MMOL/L (ref 5–15)
ARTERIAL PATENCY WRIST A: YES
AST SERPL-CCNC: 16 U/L (ref 15–37)
BASE EXCESS BLD CALC-SCNC: 3 MMOL/L
BASOPHILS # BLD: 0 K/UL (ref 0–0.1)
BASOPHILS NFR BLD: 0 % (ref 0–1)
BDY SITE: ABNORMAL
BILIRUB SERPL-MCNC: 0.5 MG/DL (ref 0.2–1)
BNP SERPL-MCNC: 1818 PG/ML
BUN SERPL-MCNC: 23 MG/DL (ref 6–20)
BUN/CREAT SERPL: 15 (ref 12–20)
CA-I BLD-SCNC: 1.34 MMOL/L (ref 1.12–1.32)
CALCIUM SERPL-MCNC: 9 MG/DL (ref 8.5–10.1)
CHLORIDE SERPL-SCNC: 110 MMOL/L (ref 97–108)
CO2 SERPL-SCNC: 31 MMOL/L (ref 21–32)
COVID-19 RAPID TEST, COVR: NOT DETECTED
CREAT SERPL-MCNC: 1.57 MG/DL (ref 0.55–1.02)
DIFFERENTIAL METHOD BLD: ABNORMAL
EOSINOPHIL # BLD: 0.3 K/UL (ref 0–0.4)
EOSINOPHIL NFR BLD: 4 % (ref 0–7)
ERYTHROCYTE [DISTWIDTH] IN BLOOD BY AUTOMATED COUNT: 19.1 % (ref 11.5–14.5)
GAS FLOW.O2 O2 DELIVERY SYS: ABNORMAL L/MIN
GLOBULIN SER CALC-MCNC: 3.7 G/DL (ref 2–4)
GLUCOSE BLD STRIP.AUTO-MCNC: 142 MG/DL (ref 65–100)
GLUCOSE SERPL-MCNC: 103 MG/DL (ref 65–100)
HCO3 BLD-SCNC: 27.8 MMOL/L (ref 22–26)
HCT VFR BLD AUTO: 35.1 % (ref 35–47)
HEALTH STATUS, XMCV2T: NORMAL
HGB BLD-MCNC: 10.1 G/DL (ref 11.5–16)
IMM GRANULOCYTES # BLD AUTO: 0.1 K/UL (ref 0–0.04)
IMM GRANULOCYTES NFR BLD AUTO: 1 % (ref 0–0.5)
LACTATE BLD-SCNC: 0.95 MMOL/L (ref 0.4–2)
LYMPHOCYTES # BLD: 0.9 K/UL (ref 0.8–3.5)
LYMPHOCYTES NFR BLD: 12 % (ref 12–49)
MCH RBC QN AUTO: 23.9 PG (ref 26–34)
MCHC RBC AUTO-ENTMCNC: 28.8 G/DL (ref 30–36.5)
MCV RBC AUTO: 83.2 FL (ref 80–99)
MONOCYTES # BLD: 0.5 K/UL (ref 0–1)
MONOCYTES NFR BLD: 6 % (ref 5–13)
NEUTS SEG # BLD: 6.1 K/UL (ref 1.8–8)
NEUTS SEG NFR BLD: 77 % (ref 32–75)
NRBC # BLD: 0.02 K/UL (ref 0–0.01)
NRBC BLD-RTO: 0.3 PER 100 WBC
PCO2 BLD: 46.8 MMHG (ref 35–45)
PH BLD: 7.38 [PH] (ref 7.35–7.45)
PLATELET # BLD AUTO: 178 K/UL (ref 150–400)
PMV BLD AUTO: 11.9 FL (ref 8.9–12.9)
PO2 BLD: 78 MMHG (ref 80–100)
POTASSIUM SERPL-SCNC: 3.5 MMOL/L (ref 3.5–5.1)
PROT SERPL-MCNC: 6.8 G/DL (ref 6.4–8.2)
RBC # BLD AUTO: 4.22 M/UL (ref 3.8–5.2)
RBC MORPH BLD: ABNORMAL
SAO2 % BLD: 95 % (ref 92–97)
SERVICE CMNT-IMP: ABNORMAL
SODIUM SERPL-SCNC: 143 MMOL/L (ref 136–145)
SOURCE, COVRS: NORMAL
SPECIMEN SOURCE, FCOV2M: NORMAL
SPECIMEN TYPE, XMCV1T: NORMAL
SPECIMEN TYPE: ABNORMAL
TROPONIN I SERPL-MCNC: <0.05 NG/ML
WBC # BLD AUTO: 7.9 K/UL (ref 3.6–11)

## 2020-12-13 PROCEDURE — 72131 CT LUMBAR SPINE W/O DYE: CPT

## 2020-12-13 PROCEDURE — 65270000029 HC RM PRIVATE

## 2020-12-13 PROCEDURE — 74011250637 HC RX REV CODE- 250/637: Performed by: STUDENT IN AN ORGANIZED HEALTH CARE EDUCATION/TRAINING PROGRAM

## 2020-12-13 PROCEDURE — 71275 CT ANGIOGRAPHY CHEST: CPT

## 2020-12-13 PROCEDURE — 36415 COLL VENOUS BLD VENIPUNCTURE: CPT

## 2020-12-13 PROCEDURE — 74011000250 HC RX REV CODE- 250: Performed by: STUDENT IN AN ORGANIZED HEALTH CARE EDUCATION/TRAINING PROGRAM

## 2020-12-13 PROCEDURE — 80053 COMPREHEN METABOLIC PANEL: CPT

## 2020-12-13 PROCEDURE — 82962 GLUCOSE BLOOD TEST: CPT

## 2020-12-13 PROCEDURE — 99285 EMERGENCY DEPT VISIT HI MDM: CPT

## 2020-12-13 PROCEDURE — 87635 SARS-COV-2 COVID-19 AMP PRB: CPT

## 2020-12-13 PROCEDURE — 74011000636 HC RX REV CODE- 636: Performed by: STUDENT IN AN ORGANIZED HEALTH CARE EDUCATION/TRAINING PROGRAM

## 2020-12-13 PROCEDURE — 36600 WITHDRAWAL OF ARTERIAL BLOOD: CPT

## 2020-12-13 PROCEDURE — 93005 ELECTROCARDIOGRAM TRACING: CPT

## 2020-12-13 PROCEDURE — 83605 ASSAY OF LACTIC ACID: CPT

## 2020-12-13 PROCEDURE — 85025 COMPLETE CBC W/AUTO DIFF WBC: CPT

## 2020-12-13 PROCEDURE — 82803 BLOOD GASES ANY COMBINATION: CPT

## 2020-12-13 PROCEDURE — 83880 ASSAY OF NATRIURETIC PEPTIDE: CPT

## 2020-12-13 PROCEDURE — 84484 ASSAY OF TROPONIN QUANT: CPT

## 2020-12-13 PROCEDURE — 71045 X-RAY EXAM CHEST 1 VIEW: CPT

## 2020-12-13 RX ORDER — DIPHENHYDRAMINE HCL 25 MG
25 CAPSULE ORAL
Status: DISCONTINUED | OUTPATIENT
Start: 2020-12-13 | End: 2020-12-24 | Stop reason: HOSPADM

## 2020-12-13 RX ORDER — PREGABALIN 75 MG/1
300 CAPSULE ORAL 2 TIMES DAILY
Status: DISCONTINUED | OUTPATIENT
Start: 2020-12-13 | End: 2020-12-24 | Stop reason: HOSPADM

## 2020-12-13 RX ORDER — ONDANSETRON 2 MG/ML
4 INJECTION INTRAMUSCULAR; INTRAVENOUS
Status: DISCONTINUED | OUTPATIENT
Start: 2020-12-13 | End: 2020-12-24 | Stop reason: HOSPADM

## 2020-12-13 RX ORDER — MAGNESIUM SULFATE 100 %
4 CRYSTALS MISCELLANEOUS AS NEEDED
Status: DISCONTINUED | OUTPATIENT
Start: 2020-12-13 | End: 2020-12-24 | Stop reason: HOSPADM

## 2020-12-13 RX ORDER — GUAIFENESIN 100 MG/5ML
81 LIQUID (ML) ORAL DAILY
Status: DISCONTINUED | OUTPATIENT
Start: 2020-12-14 | End: 2020-12-24 | Stop reason: HOSPADM

## 2020-12-13 RX ORDER — DABIGATRAN ETEXILATE 150 MG/1
150 CAPSULE ORAL 2 TIMES DAILY
Status: DISCONTINUED | OUTPATIENT
Start: 2020-12-13 | End: 2020-12-24 | Stop reason: HOSPADM

## 2020-12-13 RX ORDER — CEPHALEXIN 250 MG/1
500 CAPSULE ORAL 4 TIMES DAILY
Status: DISPENSED | OUTPATIENT
Start: 2020-12-13 | End: 2020-12-18

## 2020-12-13 RX ORDER — CYANOCOBALAMIN (VITAMIN B-12) 500 MCG
400 TABLET ORAL DAILY
Status: DISCONTINUED | OUTPATIENT
Start: 2020-12-14 | End: 2020-12-24 | Stop reason: HOSPADM

## 2020-12-13 RX ORDER — ACETAMINOPHEN 325 MG/1
650 TABLET ORAL
Status: DISCONTINUED | OUTPATIENT
Start: 2020-12-13 | End: 2020-12-15

## 2020-12-13 RX ORDER — POLYETHYLENE GLYCOL 3350 17 G/17G
17 POWDER, FOR SOLUTION ORAL DAILY PRN
Status: DISCONTINUED | OUTPATIENT
Start: 2020-12-13 | End: 2020-12-24 | Stop reason: HOSPADM

## 2020-12-13 RX ORDER — DEXTROSE 50 % IN WATER (D50W) INTRAVENOUS SYRINGE
12.5-25 AS NEEDED
Status: DISCONTINUED | OUTPATIENT
Start: 2020-12-13 | End: 2020-12-24 | Stop reason: HOSPADM

## 2020-12-13 RX ORDER — BUMETANIDE 1 MG/1
2 TABLET ORAL DAILY
Status: DISCONTINUED | OUTPATIENT
Start: 2020-12-14 | End: 2020-12-14

## 2020-12-13 RX ORDER — VENLAFAXINE HYDROCHLORIDE 150 MG/1
150 CAPSULE, EXTENDED RELEASE ORAL
Status: DISCONTINUED | OUTPATIENT
Start: 2020-12-14 | End: 2020-12-24 | Stop reason: HOSPADM

## 2020-12-13 RX ORDER — HYDRALAZINE HYDROCHLORIDE 25 MG/1
25 TABLET, FILM COATED ORAL 2 TIMES DAILY
Status: DISCONTINUED | OUTPATIENT
Start: 2020-12-13 | End: 2020-12-17

## 2020-12-13 RX ORDER — ACETAMINOPHEN 325 MG/1
650 TABLET ORAL
Status: DISCONTINUED | OUTPATIENT
Start: 2020-12-13 | End: 2020-12-24 | Stop reason: HOSPADM

## 2020-12-13 RX ORDER — INSULIN LISPRO 100 [IU]/ML
INJECTION, SOLUTION INTRAVENOUS; SUBCUTANEOUS
Status: DISCONTINUED | OUTPATIENT
Start: 2020-12-13 | End: 2020-12-24 | Stop reason: HOSPADM

## 2020-12-13 RX ORDER — PANTOPRAZOLE SODIUM 40 MG/1
40 TABLET, DELAYED RELEASE ORAL
Status: DISCONTINUED | OUTPATIENT
Start: 2020-12-14 | End: 2020-12-24 | Stop reason: HOSPADM

## 2020-12-13 RX ORDER — METOPROLOL SUCCINATE 50 MG/1
100 TABLET, EXTENDED RELEASE ORAL DAILY
Status: DISCONTINUED | OUTPATIENT
Start: 2020-12-14 | End: 2020-12-24 | Stop reason: HOSPADM

## 2020-12-13 RX ORDER — PROMETHAZINE HYDROCHLORIDE 25 MG/1
12.5 TABLET ORAL
Status: DISCONTINUED | OUTPATIENT
Start: 2020-12-13 | End: 2020-12-24 | Stop reason: HOSPADM

## 2020-12-13 RX ORDER — CYCLOBENZAPRINE HCL 10 MG
10 TABLET ORAL
Status: DISCONTINUED | OUTPATIENT
Start: 2020-12-13 | End: 2020-12-24 | Stop reason: HOSPADM

## 2020-12-13 RX ORDER — TRAMADOL HYDROCHLORIDE 50 MG/1
50 TABLET ORAL
Status: DISCONTINUED | OUTPATIENT
Start: 2020-12-13 | End: 2020-12-24 | Stop reason: HOSPADM

## 2020-12-13 RX ORDER — POTASSIUM CHLORIDE 750 MG/1
10 TABLET, FILM COATED, EXTENDED RELEASE ORAL 3 TIMES DAILY
Status: DISCONTINUED | OUTPATIENT
Start: 2020-12-13 | End: 2020-12-16

## 2020-12-13 RX ORDER — ISOSORBIDE MONONITRATE 30 MG/1
60 TABLET, EXTENDED RELEASE ORAL DAILY
Status: DISCONTINUED | OUTPATIENT
Start: 2020-12-14 | End: 2020-12-23

## 2020-12-13 RX ORDER — ONDANSETRON 2 MG/ML
4 INJECTION INTRAMUSCULAR; INTRAVENOUS
Status: DISCONTINUED | OUTPATIENT
Start: 2020-12-13 | End: 2020-12-15

## 2020-12-13 RX ORDER — ACETAMINOPHEN 650 MG/1
650 SUPPOSITORY RECTAL
Status: DISCONTINUED | OUTPATIENT
Start: 2020-12-13 | End: 2020-12-16

## 2020-12-13 RX ORDER — ACETAMINOPHEN 500 MG
1000 TABLET ORAL
Status: DISCONTINUED | OUTPATIENT
Start: 2020-12-13 | End: 2020-12-15 | Stop reason: SDUPTHER

## 2020-12-13 RX ORDER — CALCIUM CARBONATE 500(1250)
500 TABLET ORAL DAILY
Status: DISCONTINUED | OUTPATIENT
Start: 2020-12-14 | End: 2020-12-24 | Stop reason: HOSPADM

## 2020-12-13 RX ORDER — OXYCODONE AND ACETAMINOPHEN 5; 325 MG/1; MG/1
1 TABLET ORAL
Status: COMPLETED | OUTPATIENT
Start: 2020-12-13 | End: 2020-12-13

## 2020-12-13 RX ORDER — LISINOPRIL 20 MG/1
20 TABLET ORAL DAILY
Status: DISCONTINUED | OUTPATIENT
Start: 2020-12-14 | End: 2020-12-24 | Stop reason: HOSPADM

## 2020-12-13 RX ORDER — CLONAZEPAM 0.5 MG/1
0.5 TABLET ORAL
Status: DISCONTINUED | OUTPATIENT
Start: 2020-12-13 | End: 2020-12-24 | Stop reason: HOSPADM

## 2020-12-13 RX ORDER — ATORVASTATIN CALCIUM 40 MG/1
40 TABLET, FILM COATED ORAL
Status: DISCONTINUED | OUTPATIENT
Start: 2020-12-13 | End: 2020-12-24 | Stop reason: HOSPADM

## 2020-12-13 RX ORDER — SODIUM CHLORIDE 0.9 % (FLUSH) 0.9 %
5-40 SYRINGE (ML) INJECTION AS NEEDED
Status: DISCONTINUED | OUTPATIENT
Start: 2020-12-13 | End: 2020-12-24 | Stop reason: HOSPADM

## 2020-12-13 RX ORDER — ALBUTEROL SULFATE 0.83 MG/ML
2.5 SOLUTION RESPIRATORY (INHALATION)
Status: DISCONTINUED | OUTPATIENT
Start: 2020-12-13 | End: 2020-12-14

## 2020-12-13 RX ORDER — SODIUM CHLORIDE 0.9 % (FLUSH) 0.9 %
5-40 SYRINGE (ML) INJECTION EVERY 8 HOURS
Status: DISCONTINUED | OUTPATIENT
Start: 2020-12-13 | End: 2020-12-24 | Stop reason: HOSPADM

## 2020-12-13 RX ADMIN — CLONAZEPAM 0.5 MG: 0.5 TABLET ORAL at 21:33

## 2020-12-13 RX ADMIN — PREGABALIN 300 MG: 150 CAPSULE ORAL at 19:37

## 2020-12-13 RX ADMIN — CEPHALEXIN 500 MG: 250 CAPSULE ORAL at 21:33

## 2020-12-13 RX ADMIN — ACETAMINOPHEN 650 MG: 325 TABLET ORAL at 21:33

## 2020-12-13 RX ADMIN — OXYCODONE HYDROCHLORIDE AND ACETAMINOPHEN 1 TABLET: 5; 325 TABLET ORAL at 13:45

## 2020-12-13 RX ADMIN — ARFORMOTEROL TARTRATE: 15 SOLUTION RESPIRATORY (INHALATION) at 19:36

## 2020-12-13 RX ADMIN — IOPAMIDOL 100 ML: 755 INJECTION, SOLUTION INTRAVENOUS at 16:43

## 2020-12-13 RX ADMIN — Medication 10 ML: at 21:34

## 2020-12-13 RX ADMIN — HYDRALAZINE HYDROCHLORIDE 25 MG: 50 TABLET, FILM COATED ORAL at 19:37

## 2020-12-13 RX ADMIN — ATORVASTATIN CALCIUM 40 MG: 40 TABLET, FILM COATED ORAL at 21:33

## 2020-12-13 RX ADMIN — ALBUTEROL SULFATE 2.5 MG: 2.5 SOLUTION RESPIRATORY (INHALATION) at 19:36

## 2020-12-13 RX ADMIN — POTASSIUM CHLORIDE 10 MEQ: 750 TABLET, FILM COATED, EXTENDED RELEASE ORAL at 21:33

## 2020-12-13 RX ADMIN — CYCLOBENZAPRINE 10 MG: 10 TABLET, FILM COATED ORAL at 19:38

## 2020-12-13 RX ADMIN — DABIGATRAN ETEXILATE MESYLATE 150 MG: 150 CAPSULE ORAL at 21:33

## 2020-12-13 NOTE — ED PROVIDER NOTES
EMERGENCY DEPARTMENT HISTORY AND PHYSICAL EXAM      Date: 12/13/2020  Patient Name: Spencer Jameson    History of Presenting Illness     Chief Complaint   Patient presents with    Back Pain     10/10, history of back pain, acute exacerbation this morning, walking around home yesterday, unable to get out of bed this morning    Flu Like Symptoms     pt with recent exposure to COVID positive son in law, fever for .9, afebrile at hospital, 82% on RA on arrival       History Provided By: Patient    HPI: Spencer Jameson, 78 y.o. female with pmhx of low back pain, spinal stenosis, SHAYLA on CPAP, CHF, type 2 diabetes, CAD, CKD, asthma, presents to the ED with cc of bilateral lower back pain. Patient reports bilateral back pain being consistent with her usual lumbar stenosis pain, associated with radiation into her right lower extremity, which is not unusual.  Denies any red flag back pain symptoms such as loss of bladder or bowel control, saddle anesthesia, etc.  Patient was incidentally found by EMS to be febrile with a temp of 100.7F, not currently febrile in the ED. She was also and was hypoxic to 82% on room air on ED arrival. Patient denies cough worse than usual, no shortness of breath. No chest pain. Patient denies worsening lower extremity edema. Denies orthopnea. Patient was recently around her son-in-law, who was found to be Covid positive and is now admitted to the ICU on a ventilator. There are no other complaints, changes, or physical findings at this time. PCP: Katarina Reza MD    No current facility-administered medications on file prior to encounter. Current Outpatient Medications on File Prior to Encounter   Medication Sig Dispense Refill    clonazePAM (KlonoPIN) 0.5 mg tablet TAKE 1 TABLET BY MOUTH NIGHTLY .  DO NOT EXCEED  0.5MG  PER  DAY 90 Tab 0    triamcinolone acetonide (KENALOG) 0.1 % topical cream APPLY A THIN FILM OF CREAM EXTERNALLY TO AFFECTED AREA TWICE DAILY 15 g 3  clotrimazole-betamethasone (LOTRISONE) topical cream APPLY TO THE AFFECTED AREA 2 TIMES DAILY 15 g 3    isosorbide mononitrate ER (IMDUR) 60 mg CR tablet Take 1 Tab by mouth daily. 90 Tab 3    cyclobenzaprine (FLEXERIL) 10 mg tablet Take 1 tablet by mouth three times daily as needed for muscle spasm 90 Tab 0    glimepiride (AMARYL) 2 mg tablet Take 1 tablet by mouth twice daily 180 Tab 3    magnesium oxide (MAG-OX) 400 mg tablet Take 1 tablet by mouth twice daily 180 Tab 3    loratadine (Claritin) 10 mg tablet Take 10 mg by mouth two (2) times a day. Indications: Patient states she is using Claritin instead of Benadryl, is not taking Benadryl per patient.  bumetanide (BUMEX) 2 mg tablet Take 1 Tab by mouth daily. 90 Tab 1    lisinopriL (PRINIVIL, ZESTRIL) 40 mg tablet Take 0.5 Tabs by mouth daily. 30 Tab 1    mupirocin calcium (BACTROBAN) 2 % topical cream Apply  to affected area two (2) times a day. 15 g 0    omeprazole (PRILOSEC) 40 mg capsule TAKE 1 CAPSULE EVERY DAY 90 Cap 3    potassium chloride SR (KLOR-CON 10) 10 mEq tablet Take 1 Tab by mouth three (3) times daily. 540 Tab 3    hydrALAZINE (APRESOLINE) 25 mg tablet Take 25 mg by mouth two (2) times a day.  calcium carb/vit D2/minerals (CALTRATE PLUS PO) Take 500 mg by mouth daily.  SYMBICORT 160-4.5 mcg/actuation HFAA INHALE 2 PUFFS BY MOUTH TWICE DAILY 1 Inhaler 3    albuterol (PROVENTIL HFA, VENTOLIN HFA, PROAIR HFA) 90 mcg/actuation inhaler Take 1 Puff by inhalation every four (4) hours as needed for Wheezing. 1 Inhaler 6    diphenhydrAMINE (BENADRYL) 25 mg capsule Take 25 mg by mouth two (2) times a day.  nystatin (MYCOSTATIN) topical cream Apply  to affected area two (2) times daily as needed for Skin Irritation (Rash).  traMADol (ULTRAM) 50 mg tablet Take 50 mg by mouth daily as needed for Pain (Used to supplement the Lyrica when lyrica doesnt manage the pain on its own).       dabigatran etexilate (PRADAXA) 150 mg capsule Take 1 Cap by mouth two (2) times a day. IF NO BLEEDING AT CATH SITE. 60 Cap 12    amLODIPine (NORVASC) 2.5 mg tablet Take 1 Tab by mouth daily. (Patient taking differently: Take 10 mg by mouth daily.) 90 Tab 3    metoprolol succinate (TOPROL-XL) 100 mg tablet Take 1 Tab by mouth daily. 30 Tab prn    acetaminophen (TYLENOL EXTRA STRENGTH) 500 mg tablet Take 1,000 mg by mouth every eight (8) hours as needed for Pain (Headache).  lidocaine (ASPERCREME, LIDOCAINE,) 4 % topical cream Apply  to affected area daily as needed for Pain (Knee pain).  sodium chloride (AYR SALINE) 0.65 % nasal squeeze bottle 2 Sprays by Both Nostrils route every eight (8) hours as needed.  conjugated estrogens (PREMARIN) 0.625 mg/gram vaginal cream Insert 0.5 g into vagina two (2) times a week. Tuesdays and Thursdays       venlafaxine-SR (EFFEXOR XR) 150 mg capsule Take 150 mg by mouth two (2) times daily (with meals).  pregabalin (LYRICA) 200 mg capsule Take 300 mg by mouth two (2) times a day.  cholecalciferol, vitamin d3, (VITAMIN D3) 400 unit cap Take 400 Units by mouth daily.  aspirin 81 mg chewable tablet Take 81 mg by mouth daily.  atorvastatin (LIPITOR) 40 mg tablet Take 1 Tab by mouth nightly.  27 Tab 12       Past History     Past Medical History:  Past Medical History:   Diagnosis Date    Anxiety 1/22/2018    Arthritis     OA    Asthma     Diabetes ([facility] [address]     Essential hypertension     GERD (gastroesophageal reflux disease)     Hypercholesterolemia 1/22/2018    Hypertension     Ill-defined condition     diverticulitis    Long-term use of high-risk medication 1/22/2018    Neuropathy     Obesity (BMI 30-39.9) 4/30/2019    Other ill-defined conditions(799.89)     IBS, spinal stenosis    Plantar fasciitis     Psychiatric disorder     depression, anxiety    Sleep apnea     uses CPAP    Type 2 diabetes mellitus with diabetic neuropathy, without long-term current use of insulin ([address] 6/5/2016       Past Surgical History:  Past Surgical History:   Procedure Laterality Date    CARDIAC SURG PROCEDURE UNLIST      stent    COLONOSCOPY N/A 3/14/2018    COLONOSCOPY [facility]un[facility]enot [facility] at [facility] ENDOSCOPY    HX APPENDECTOMY      HX CHOLECYSTECTOMY  11/15/2018    HX HYSTERECTOMY      HX PACEMAKER         Family History:  Family History   Problem Relation Age of Onset   26 [facility] Arthritis-osteo Mother     Hypertension Mother     High Cholesterol Mother     Crohn's Disease Mother     Heart Disease Mother     Alcohol abuse Father     High Cholesterol Sister     Hypertension Sister     Thyroid Disease Sister     COPD Sister     High Cholesterol Brother     Hypertension Brother     COPD Brother     COPD Child     Inflammatory Bowel Dz Child        Social History:  Social History     Tobacco Use    Smoking status: Never Smoker    Smokeless tobacco: Never Used   Substance Use Topics    Alcohol use: No    Drug use: No       Allergies: Allergies   Allergen Reactions    Sulfa (Sulfonamide Antibiotics) Swelling    Amoxicillin Swelling         Review of Systems   Review of Systems   Constitutional: Negative for chills and fever. HENT: Negative for congestion and rhinorrhea. Eyes: Negative for visual disturbance. Respiratory: Negative for chest tightness and shortness of breath. Cardiovascular: Negative for chest pain and palpitations. Gastrointestinal: Negative for abdominal pain, diarrhea, nausea and vomiting. Genitourinary: Negative for dysuria, flank pain and hematuria. Musculoskeletal: Positive for back pain. Negative for neck pain. Skin: Negative for rash. Allergic/Immunologic: Negative for immunocompromised state. Neurological: Negative for dizziness, speech difficulty, weakness and headaches. Hematological: Negative for adenopathy. Psychiatric/Behavioral: Negative for dysphoric mood and suicidal ideas.        Physical Exam   Physical Exam  Vitals signs and nursing note reviewed. Constitutional:       General: She is not in acute distress. Appearance: She is obese. She is not ill-appearing or toxic-appearing. HENT:      Head: Normocephalic and atraumatic. Nose: Nose normal.      Mouth/Throat:      Mouth: Mucous membranes are moist.   Eyes:      Extraocular Movements: Extraocular movements intact. Pupils: Pupils are equal, round, and reactive to light. Neck:      Musculoskeletal: Normal range of motion and neck supple. Cardiovascular:      Rate and Rhythm: Normal rate and regular rhythm. Pulses: Normal pulses. Pulmonary:      Effort: Pulmonary effort is normal.      Breath sounds: No stridor. No wheezing or rhonchi. Abdominal:      General: Abdomen is flat. There is no distension. Tenderness: There is no abdominal tenderness. Musculoskeletal: Normal range of motion. Right lower leg: Edema present. Left lower leg: Edema present. Comments: 1-2 + edema in bilateral lower extremities. Per patient- at baseline. Bilateral lower lumbar spinal level paraspinal muscle TTP. SLR + on the RLE   Skin:     General: Skin is warm and dry. Neurological:      General: No focal deficit present. Mental Status: She is alert and oriented to person, place, and time.    Psychiatric:         Judgment: Judgment normal.         Diagnostic Study Results     Labs -     Recent Results (from the past 12 hour(s))   NT-PRO BNP    Collection Time: 12/13/20  1:09 PM   Result Value Ref Range    NT pro-BNP 1,818 (H) <125 PG/ML   CBC WITH AUTOMATED DIFF    Collection Time: 12/13/20  1:09 PM   Result Value Ref Range    WBC 7.9 3.6 - 11.0 K/uL    RBC 4.22 3.80 - 5.20 M/uL    HGB 10.1 (L) 11.5 - 16.0 g/dL    HCT 35.1 35.0 - 47.0 %    MCV 83.2 80.0 - 99.0 FL    MCH 23.9 (L) 26.0 - 34.0 PG    MCHC 28.8 (L) 30.0 - 36.5 g/dL    RDW 19.1 (H) 11.5 - 14.5 %    PLATELET 779 936 - 930 K/uL    MPV 11.9 8.9 - 12.9 FL    NRBC 0.3 (H) 0  WBC    ABSOLUTE NRBC 0.02 (H) 0.00 - 0.01 K/uL    NEUTROPHILS 77 (H) 32 - 75 %    LYMPHOCYTES 12 12 - 49 %    MONOCYTES 6 5 - 13 %    EOSINOPHILS 4 0 - 7 %    BASOPHILS 0 0 - 1 %    IMMATURE GRANULOCYTES 1 (H) 0.0 - 0.5 %    ABS. NEUTROPHILS 6.1 1.8 - 8.0 K/UL    ABS. LYMPHOCYTES 0.9 0.8 - 3.5 K/UL    ABS. MONOCYTES 0.5 0.0 - 1.0 K/UL    ABS. EOSINOPHILS 0.3 0.0 - 0.4 K/UL    ABS. BASOPHILS 0.0 0.0 - 0.1 K/UL    ABS. IMM. GRANS. 0.1 (H) 0.00 - 0.04 K/UL    DF AUTOMATED      RBC COMMENTS ANISOCYTOSIS  2+       METABOLIC PANEL, COMPREHENSIVE    Collection Time: 12/13/20  1:09 PM   Result Value Ref Range    Sodium 143 136 - 145 mmol/L    Potassium 3.5 3.5 - 5.1 mmol/L    Chloride 110 (H) 97 - 108 mmol/L    CO2 31 21 - 32 mmol/L    Anion gap 2 (L) 5 - 15 mmol/L    Glucose 103 (H) 65 - 100 mg/dL    BUN 23 (H) 6 - 20 MG/DL    Creatinine 1.57 (H) 0.55 - 1.02 MG/DL    BUN/Creatinine ratio 15 12 - 20      GFR est AA 40 (L) >60 ml/min/1.73m2    GFR est non-AA 33 (L) >60 ml/min/1.73m2    Calcium 9.0 8.5 - 10.1 MG/DL    Bilirubin, total 0.5 0.2 - 1.0 MG/DL    ALT (SGPT) 20 12 - 78 U/L    AST (SGOT) 16 15 - 37 U/L    Alk.  phosphatase 168 (H) 45 - 117 U/L    Protein, total 6.8 6.4 - 8.2 g/dL    Albumin 3.1 (L) 3.5 - 5.0 g/dL    Globulin 3.7 2.0 - 4.0 g/dL    A-G Ratio 0.8 (L) 1.1 - 2.2     TROPONIN I    Collection Time: 12/13/20  1:09 PM   Result Value Ref Range    Troponin-I, Qt. <0.05 <0.05 ng/mL   POC LACTIC ACID    Collection Time: 12/13/20  1:16 PM   Result Value Ref Range    Lactic Acid (POC) 0.95 0.40 - 2.00 mmol/L   SARS-COV-2    Collection Time: 12/13/20  1:44 PM   Result Value Ref Range    Specimen source Nasopharyngeal      Specimen source NP SWAB     COVID-19 rapid test Not detected NOTD      Specimen type NP Swab      Health status Symptomatic Testing     SARS-COV-2    Collection Time: 12/13/20  3:54 PM   Result Value Ref Range    Specimen source Nasopharyngeal      SARS-CoV-2 PENDING     SARS-CoV-2 PENDING Specimen source NP SWAB     COVID-19 rapid test PENDING     Specimen type NP Swab      Health status PENDING     COVID-19 PENDING        Radiologic Studies -   XR CHEST PORT   Final Result   IMPRESSION: No acute findings. NM LUNG VENT/PERF    (Results Pending)   CT SPINE LUMB WO CONT    (Results Pending)     CT Results  (Last 48 hours)    None        CXR Results  (Last 48 hours)               12/13/20 1453  XR CHEST PORT Final result    Impression:  IMPRESSION: No acute findings. Narrative:  EXAM: XR CHEST PORT       INDICATION: Shortness of breath. COMPARISON: CT 8/9/2020. Chest x-ray 8/9/2020. FINDINGS: A portable AP radiograph of the chest was obtained at 14:47 hours. The   patient is on a cardiac monitor. The lungs are clear. Pacemaker generator body   projects over the left chest wall with intact appearing leads traversing in   expected course. The cardiac silhouette remains mildly enlarged and the   mediastinal contours and pulmonary vascularity are normal.  The bones and soft   tissues are grossly within normal limits. Medical Decision Making   I am the first provider for this patient. I reviewed the vital signs, available nursing notes, past medical history, past surgical history, family history and social history. Vital Signs-Reviewed the patient's vital signs. Patient Vitals for the past 12 hrs:   Temp Pulse Resp BP SpO2   12/13/20 1133     90 %   12/13/20 1129 98.7 °F (37.1 °C) 81 20 (!) 150/73 (!) 82 %       EKG interpretation: (Preliminary)  Ventricular paced rhythm, rate of 80, no acute ischemic changes compared to previous EKG from August 2020. EKG interpretation by Kay Alex MD    Records Reviewed: Nursing Notes and Old Medical Records    Provider Notes (Medical Decision Making):   57-year-old female presenting for evaluation of exacerbation of chronic bilateral back pain.   Patient has history of lumbar stenosis with radiculopathy, and states that her back pain is similar in character to her previous. She was incidentally found to be febrile for EMS, but is afebrile for us in the ED with a temp of 98.7 °F.  Patient denies any subjective fevers or chills. She was also incidentally found to be hypoxic, 82% on room air in the ED. Subsequently placed on 5 L of nasal cannula O2 with improvement in SPO2. Patient denies any increased cough, shortness of breath. She does have known history of CHF, but states that her lower extremity edema is not worse than usual.  On exam, patient is morbidly obese, nontoxic, and not in acute distress. she has no tachypnea, or increased work of breathing, able to speak in full sentences without distress. Breath sounds are present and clear bilaterally. She has 1-2+ nonpitting edema in bilateral lower extremities that is symmetric. No calf tenderness. Reproducible back pain on exam, that is worsened with movement. No midline L-spine tenderness or step-offs. No lower extremity focal neurologic findings. Straight leg raise test positive on the right, consistent with sciatica. EKG without acute ischemic changes. Troponin is negative. BNP is elevated at 1818, but this is actually improved compared to patient's baseline BNP elevations. Labs otherwise without leukocytosis, lactic acid is negative. She has baseline anemia, with hemoglobin of 10.1. Renal function is at baseline. Chest x-ray without acute findings. No evidence of vascular congestion. Rapid Covid swab was negative, however, due to patient's high risk exposure recently, I have sent Covid PCR test, which is currently pending. Due to patient's significant hypoxia requiring supplemental O2, she will require admission for further management and work-up. Discussed with the hospitalist- requested further work-up for PE with V/Q scan as well as CT imaging of lumbar spine. Both are ordered and pending at this time.   V/Q scan was later changed to CTA as nuclear medicine not in-house at this hour, and patient's GFR is above the cutoff for CTA imaging, and nuclear med requested patient undergo CTA instead. I feel that while patient does have risk factors for thromboembolism, she does not have enough suspicious clinical features for me to initiate anticoagulation prior to imaging confirmation of PE as current bleeding risk outweighs benefit of early anticoagulation in an otherwise stable patient with no findings on workup concerning for massive PE. Patient otherwise accepted for admission in stable condition at this time. ED Course:   Initial assessment performed. The patients presenting problems have been discussed, and they are in agreement with the care plan formulated and outlined with them. I have encouraged them to ask questions as they arise throughout their visit. Mahad Camarillo MD      Disposition:  Admit      Diagnosis     Clinical Impression:   1. Acute respiratory failure with hypoxia (HCC)        Attestations:    Mahad Camarillo MD    Please note that this dictation was completed with [employer], the computer voice recognition software. Quite often unanticipated grammatical, syntax, homophones, and other interpretive errors are inadvertently transcribed by the computer software. Please disregard these errors. Please excuse any errors that have escaped final proofreading. Thank you.

## 2020-12-13 NOTE — H&P
Hospitalist Admission Note    NAME: Odilia Gupta   :  1950   MRN:  [id number]     Date/Time:  2020 5:32 PM    Patient PCP: Kayode Ashford MD  ______________________________________________________________________  Given the patient's current clinical presentation, I have a high level of concern for decompensation if discharged from the emergency department. Complex decision making was performed, which includes reviewing the patient's available past medical records, laboratory results, and x-ray films. My assessment of this patient's clinical condition and my plan of care is as follows. Assessment / Plan:    Worsening of lower back pain  Spinal stenosis  -Main reason for hospital presentation  -Follows with orthopedics and neurosurgery/spinal surgeon outpatient  -No red flags. Straight leg raise test negative  -CT lumbar spine ordered in ED due to fever reported by EMS. Will follow up.  -Pain control with lidocaine patch and narcotics for now    Acute on chronic hypoxic respiratory failure  Chronic systolic congestive heart failure  SHAYLA on CPAP  Mild-moderate pulmonary hypertension  CAD status post stent  -Has chronic exertional dyspnea. Follows up with pulmonologist.  Was recently prescribed pulse oximetry to monitor oxygen saturation  -O2 sat 82% on RA in ED. ABG pO2 78% on RA. Currently on 4L NC.  -Temp 100.6 per EMS. +ve contact hx of covid patient  -Rapid Covid test negative.   -Chest x-ray without acute infiltrates or congestion  -CTA negative for PE. Small bilateral effusion.  -Echo in Aug 2020 EF 35 to 40%, mild to moderate PA  -BNP elevated at 1800  -Continue home meds Bumex, Imdur, hydralazine, lisinopril, Toprol, and DAPT  -Wean off supplemental O2 as tolerated. May need O2 challenge test prior to d/c.  -Follow-up COVID PCR test.  Droplet plus precaution. Left leg cellulitis  -Erythematous and small ulcer on the left lower leg.  Failed topical antibiotic.  -Low-grade fever possibly due to cellulitis. No leukocytosis. -We will start cephalexin. Hx of penicillin allergy. Pt has tolerated keflex before. Discussed possible cross reaction. Pt in agreement to try cephalexin. CKD stage 3  - Cr at baseline. Avoid nephrotoxics. Monitor BMP    Anemia  -Hemoglobin at baseline. Continue to monitor H&H. Further work-up outpatient. Type II DM  -Hold home oral meds  -Sliding scale for now. Reassess for need of basal bolus insulin. Asthma  -DuoNeb and continue home inhalers    Anxiety: Continue clonazepam  GERD: Continue Protonix  Hyperlipidemia: Continue Lipitor  Hypertension: Continue antihypertensives    Code Status: Full code  Surrogate Decision Maker:  Antione Perales    DVT Prophylaxis: Lovenox  GI Prophylaxis: not indicated    Baseline: Uses cane and stroller to ambulate      Subjective:   CHIEF COMPLAINT: Worsening of lower back pain    HISTORY OF PRESENT ILLNESS:     Xenia Youssef is a 78 y.o.  female with past medical history significant for CHF, SHAYLA on CPAP, and spinal stenosis who presents with worsening of lower back pain over the last 3 weeks. Patient has chronic lower back pain that is on and off for years. She follows up with an orthopedic doctor and receives steroid injections almost every 2 months. Her last dose was in October. Patient gets some relief with the steroid injection but the pain often recurs. Over the last 3 weeks, her lower back pain has gotten worse to the extent she was unable to walk or get out of bed. The pain has been persistent, 10/10, and occasionally radiates to the right leg. Worse with any kind of movement, no relieving factor. She has tried different analgesics and muscle relaxants with no significant improvement. She denies subjective fever, bowel/bladder incontinence, or lower extremity weakness. Patient's  called EMS for worsening back pain.   Per EMS patient was febrile with a temp of 100.7. No record of fever in the ED. In the ED patient was noted to have a sat of 82% on room air. Patient states she has chronic shortness of breath and gets winded even with walking short distance. She follows up with her pulmonologist and she was recently prescribed a pulse oximeter to monitor her oxygen but she has not gotten it yet. She denies recent worsening of shortness of breath, cough, chest pain, myalgia, or fatigue. She does have contact history with her son-in-law who has COVID infection. Of note, patient also has left lower leg erythema and ulcer which started as a blister about a week ago. She was prescribed topical ointment/?antibiotics but hasn't seen any improvement. We were asked to admit for work up and evaluation of the above problems.      Past Medical History:   Diagnosis Date    Anxiety 1/22/2018    Arthritis     OA    Asthma     Diabetes ([address]     Essential hypertension     GERD (gastroesophageal reflux disease)     Hypercholesterolemia 1/22/2018    Hypertension     Ill-defined condition     diverticulitis    Long-term use of high-risk medication 1/22/2018    Neuropathy     Obesity (BMI 30-39.9) 4/30/2019    Other ill-defined conditions(799.89)     IBS, spinal stenosis    Plantar fasciitis     Psychiatric disorder     depression, anxiety    Sleep apnea     uses CPAP    Type 2 diabetes mellitus with diabetic neuropathy, without long-term current use of insulin ([address] 6/5/2016        Past Surgical History:   Procedure Laterality Date    CARDIAC SURG PROCEDURE UNLIST      stent    COLONOSCOPY N/A 3/14/2018    COLONOSCOPY performed by Soniya Mantilla MD at [facility] ENDOSCOPY    HX APPENDECTOMY      HX CHOLECYSTECTOMY  11/15/2018    HX HYSTERECTOMY      HX PACEMAKER         Social History     Tobacco Use    Smoking status: Never Smoker    Smokeless tobacco: Never Used   Substance Use Topics    Alcohol use: No        Family History   Problem Relation Age of Onset   Dalila Johnson Arthritis-osteo Mother     Hypertension Mother     High Cholesterol Mother     Crohn's Disease Mother     Heart Disease Mother     Alcohol abuse Father     High Cholesterol Sister     Hypertension Sister     Thyroid Disease Sister     COPD Sister     High Cholesterol Brother     Hypertension Brother     COPD Brother     COPD Child     Inflammatory Bowel Dz Child      Allergies   Allergen Reactions    Sulfa (Sulfonamide Antibiotics) Swelling    Amoxicillin Swelling        Prior to Admission medications    Medication Sig Start Date End Date Taking? Authorizing Provider   clonazePAM (KlonoPIN) 0.5 mg tablet TAKE 1 TABLET BY MOUTH NIGHTLY . DO NOT EXCEED  0.5MG  PER  DAY 11/23/20   Chidi Walls MD   triamcinolone acetonide (KENALOG) 0.1 % topical cream APPLY A THIN FILM OF CREAM EXTERNALLY TO AFFECTED AREA TWICE DAILY 10/8/20   Imelda Walls MD   clotrimazole-betamethasone (LOTRISONE) topical cream APPLY TO THE AFFECTED AREA 2 TIMES DAILY 10/8/20   Madison Shearer MD   isosorbide mononitrate ER (IMDUR) 60 mg CR tablet Take 1 Tab by mouth daily. 10/7/20   Madison Shearer MD   cyclobenzaprine (FLEXERIL) 10 mg tablet Take 1 tablet by mouth three times daily as needed for muscle spasm 10/7/20   Madison Shearer MD   glimepiride (AMARYL) 2 mg tablet Take 1 tablet by mouth twice daily 10/7/20   Madison Shearer MD   magnesium oxide (MAG-OX) 400 mg tablet Take 1 tablet by mouth twice daily 9/29/20   Madison Shearer MD   loratadine (Claritin) 10 mg tablet Take 10 mg by mouth two (2) times a day. Indications: Patient states she is using Claritin instead of Benadryl, is not taking Benadryl per patient. Provider, Historical   bumetanide (BUMEX) 2 mg tablet Take 1 Tab by mouth daily. 8/12/20   Bill You MD   lisinopriL (PRINIVIL, ZESTRIL) 40 mg tablet Take 0.5 Tabs by mouth daily.  8/12/20   Bill You MD   mupirocin calcium (BACTROBAN) 2 % topical cream Apply  to affected area two (2) times a day. 6/29/20   Willam Clarke MD   omeprazole (PRILOSEC) 40 mg capsule TAKE 1 CAPSULE EVERY DAY 5/31/20   Willam Clarke MD   potassium chloride SR (KLOR-CON 10) 10 mEq tablet Take 1 Tab by mouth three (3) times daily. 5/11/20   Steph Walls MD   hydrALAZINE (APRESOLINE) 25 mg tablet Take 25 mg by mouth two (2) times a day. Provider, Historical   calcium carb/vit D2/minerals (CALTRATE PLUS PO) Take 500 mg by mouth daily. Provider, Historical   SYMBICORT 160-4.5 mcg/actuation HFAA INHALE 2 PUFFS BY MOUTH TWICE DAILY 10/1/19   Steph Walls MD   albuterol (PROVENTIL HFA, VENTOLIN HFA, PROAIR HFA) 90 mcg/actuation inhaler Take 1 Puff by inhalation every four (4) hours as needed for Wheezing. 8/17/19   Regulo Hooker MD   diphenhydrAMINE (BENADRYL) 25 mg capsule Take 25 mg by mouth two (2) times a day. Provider, Historical   nystatin (MYCOSTATIN) topical cream Apply  to affected area two (2) times daily as needed for Skin Irritation (Rash). Provider, Historical   traMADol (ULTRAM) 50 mg tablet Take 50 mg by mouth daily as needed for Pain (Used to supplement the Lyrica when lyrica doesnt manage the pain on its own). Provider, Historical   dabigatran etexilate (PRADAXA) 150 mg capsule Take 1 Cap by mouth two (2) times a day. IF NO BLEEDING AT CATH SITE. 2/24/19   Malissa Bosworth, MD   amLODIPine (NORVASC) 2.5 mg tablet Take 1 Tab by mouth daily. Patient taking differently: Take 10 mg by mouth daily. 11/20/18   Steph Walls MD   metoprolol succinate (TOPROL-XL) 100 mg tablet Take 1 Tab by mouth daily. 11/19/18   Howard Patterson MD   acetaminophen (TYLENOL EXTRA STRENGTH) 500 mg tablet Take 1,000 mg by mouth every eight (8) hours as needed for Pain (Headache). Provider, Historical   lidocaine (ASPERCREME, LIDOCAINE,) 4 % topical cream Apply  to affected area daily as needed for Pain (Knee pain). Provider, Historical   sodium chloride (AYR SALINE) 0.65 % nasal squeeze bottle 2 Sprays by Both Nostrils route every eight (8) hours as needed. Provider, Historical   conjugated estrogens (PREMARIN) 0.625 mg/gram vaginal cream Insert 0.5 g into vagina two (2) times a week. Tuesdays and Thursdays     Provider, Historical   venlafaxine-SR (EFFEXOR XR) 150 mg capsule Take 150 mg by mouth two (2) times daily (with meals). Provider, Historical   pregabalin (LYRICA) 200 mg capsule Take 300 mg by mouth two (2) times a day. Other, MD Ernie   cholecalciferol, vitamin d3, (VITAMIN D3) 400 unit cap Take 400 Units by mouth daily. Other, MD Ernie   aspirin 81 mg chewable tablet Take 81 mg by mouth daily. Provider, Historical   atorvastatin (LIPITOR) 40 mg tablet Take 1 Tab by mouth nightly. 4/9/14   Otf Sloan MD       REVIEW OF SYSTEMS:     I am not able to complete the review of systems because:    The patient is intubated and sedated    The patient has altered mental status due to his acute medical problems    The patient has baseline aphasia from prior stroke(s)    The patient has baseline dementia and is not reliable historian    The patient is in acute medical distress and unable to provide information           Total of 12 systems reviewed as follows:       POSITIVE= underlined text  Negative = text not underlined  General:  fever, chills, sweats, generalized weakness, weight loss/gain,      loss of appetite   Eyes:    blurred vision, eye pain, loss of vision, double vision  ENT:    rhinorrhea, pharyngitis   Respiratory:   cough, sputum production, chronic SOB, STEEN, wheezing, pleuritic pain   Cardiology:   chest pain, palpitations, orthopnea, PND, edema, syncope   Gastrointestinal:  abdominal pain , N/V, diarrhea, dysphagia, constipation, bleeding   Genitourinary:  frequency, urgency, dysuria, hematuria, incontinence   Muskuloskeletal :  arthralgia, myalgia, back pain  Hematology:  easy bruising, nose or gum bleeding, lymphadenopathy   Dermatological: rash, ulceration, pruritis, color change / jaundice  Endocrine:   hot flashes or polydipsia   Neurological:  headache, dizziness, confusion, focal weakness, paresthesia,     Speech difficulties, memory loss, gait difficulty  Psychological: Feelings of anxiety, depression, agitation    Objective:   VITALS:    Visit Vitals  BP (!) 148/82   Pulse 80   Temp 98.7 °F (37.1 °C)   Resp 14   Ht 5' 9\" (1.753 m)   Wt 130.6 kg (287 lb 14.7 oz)   SpO2 99%   BMI 42.52 kg/m²       PHYSICAL EXAM:    General:    Obese, lying flat comfortably, alert, cooperative, no distress, appears stated age. HEENT: Atraumatic, anicteric sclerae, pink conjunctivae     No oral ulcers, mucosa moist, throat clear, dentition fair  Neck:  Supple, symmetrical,  thyroid: non tender  Lungs:   Clear to auscultation bilaterally. No Wheezing or Rhonchi. No rales. Chest wall:  No tenderness  No Accessory muscle use. Heart:   Regular  rhythm,  No  murmur   nonpitting edema to both lower legs  Abdomen:   Soft, non-tender. Not distended. Bowel sounds normal  Extremities: No cyanosis. No clubbing,      Skin turgor normal, Capillary refill normal, Radial dial pulse 2+  Skin:     Not pale. Not Jaundiced, small ulcer with surrounding erythema on left lower anterior leg  Psych:  Good insight. Not depressed. Not anxious or agitated. Neurologic: EOMs intact. No facial asymmetry. No aphasia or slurred speech. Symmetrical strength, Sensation grossly intact.  Alert and oriented X 4.     _______________________________________________________________________  Care Plan discussed with:    Comments   Patient x    Family      RN     Care Manager                    Consultant:      _______________________________________________________________________  Expected  Disposition:   Home with Family x   HH/PT/OT/RN    SNF/LTC    LISA    ________________________________________________________________________  TOTAL TIME:  60 Minutes    Critical Care Provided     Minutes non procedure based      Comments     Reviewed previous records   >50% of visit spent in counseling and coordination of care  Discussion with patient and/or family and questions answered       ________________________________________________________________________  Signed: Sonya Sanchez MD    Procedures: see electronic medical records for all procedures/Xrays and details which were not copied into this note but were reviewed prior to creation of Plan. LAB DATA REVIEWED:    Recent Results (from the past 24 hour(s))   NT-PRO BNP    Collection Time: 12/13/20  1:09 PM   Result Value Ref Range    NT pro-BNP 1,818 (H) <125 PG/ML   CBC WITH AUTOMATED DIFF    Collection Time: 12/13/20  1:09 PM   Result Value Ref Range    WBC 7.9 3.6 - 11.0 K/uL    RBC 4.22 3.80 - 5.20 M/uL    HGB 10.1 (L) 11.5 - 16.0 g/dL    HCT 35.1 35.0 - 47.0 %    MCV 83.2 80.0 - 99.0 FL    MCH 23.9 (L) 26.0 - 34.0 PG    MCHC 28.8 (L) 30.0 - 36.5 g/dL    RDW 19.1 (H) 11.5 - 14.5 %    PLATELET 215 500 - 212 K/uL    MPV 11.9 8.9 - 12.9 FL    NRBC 0.3 (H) 0  WBC    ABSOLUTE NRBC 0.02 (H) 0.00 - 0.01 K/uL    NEUTROPHILS 77 (H) 32 - 75 %    LYMPHOCYTES 12 12 - 49 %    MONOCYTES 6 5 - 13 %    EOSINOPHILS 4 0 - 7 %    BASOPHILS 0 0 - 1 %    IMMATURE GRANULOCYTES 1 (H) 0.0 - 0.5 %    ABS. NEUTROPHILS 6.1 1.8 - 8.0 K/UL    ABS. LYMPHOCYTES 0.9 0.8 - 3.5 K/UL    ABS. MONOCYTES 0.5 0.0 - 1.0 K/UL    ABS. EOSINOPHILS 0.3 0.0 - 0.4 K/UL    ABS. BASOPHILS 0.0 0.0 - 0.1 K/UL    ABS. IMM.  GRANS. 0.1 (H) 0.00 - 0.04 K/UL    DF AUTOMATED      RBC COMMENTS ANISOCYTOSIS  2+       METABOLIC PANEL, COMPREHENSIVE    Collection Time: 12/13/20  1:09 PM   Result Value Ref Range    Sodium 143 136 - 145 mmol/L    Potassium 3.5 3.5 - 5.1 mmol/L    Chloride 110 (H) 97 - 108 mmol/L    CO2 31 21 - 32 mmol/L    Anion gap 2 (L) 5 - 15 mmol/L    Glucose 103 (H) 65 - 100 mg/dL    BUN 23 (H) 6 - 20 MG/DL Creatinine 1.57 (H) 0.55 - 1.02 MG/DL    BUN/Creatinine ratio 15 12 - 20      GFR est AA 40 (L) >60 ml/min/1.73m2    GFR est non-AA 33 (L) >60 ml/min/1.73m2    Calcium 9.0 8.5 - 10.1 MG/DL    Bilirubin, total 0.5 0.2 - 1.0 MG/DL    ALT (SGPT) 20 12 - 78 U/L    AST (SGOT) 16 15 - 37 U/L    Alk. phosphatase 168 (H) 45 - 117 U/L    Protein, total 6.8 6.4 - 8.2 g/dL    Albumin 3.1 (L) 3.5 - 5.0 g/dL    Globulin 3.7 2.0 - 4.0 g/dL    A-G Ratio 0.8 (L) 1.1 - 2.2     TROPONIN I    Collection Time: 12/13/20  1:09 PM   Result Value Ref Range    Troponin-I, Qt. <0.05 <0.05 ng/mL   POC LACTIC ACID    Collection Time: 12/13/20  1:16 PM   Result Value Ref Range    Lactic Acid (POC) 0.95 0.40 - 2.00 mmol/L   EKG, 12 LEAD, INITIAL    Collection Time: 12/13/20  1:16 PM   Result Value Ref Range    Ventricular Rate 80 BPM    Atrial Rate 87 BPM    QRS Duration 146 ms    Q-T Interval 444 ms    QTC Calculation (Bezet) 512 ms    Calculated R Axis -107 degrees    Calculated T Axis 54 degrees    Diagnosis        Suspect unspecified pacemaker failure  Ventricular-paced rhythm  When compared with ECG of 09-AUG-2020 11:34,  premature ventricular complexes are no longer present  Vent.  rate has decreased BY   7 BPM     SARS-COV-2    Collection Time: 12/13/20  1:44 PM   Result Value Ref Range    Specimen source Nasopharyngeal      Specimen source NP SWAB     COVID-19 rapid test Not detected NOTD      Specimen type NP Swab      Health status Symptomatic Testing     SARS-COV-2    Collection Time: 12/13/20  3:54 PM   Result Value Ref Range    Specimen source Nasopharyngeal      SARS-CoV-2 PENDING     SARS-CoV-2 PENDING     Specimen source NP SWAB     COVID-19 rapid test PENDING     Specimen type NP Swab      Health status PENDING     COVID-19 PENDING

## 2020-12-14 LAB
ANION GAP SERPL CALC-SCNC: 3 MMOL/L (ref 5–15)
ATRIAL RATE: 87 BPM
BASOPHILS # BLD: 0 K/UL (ref 0–0.1)
BASOPHILS NFR BLD: 1 % (ref 0–1)
BUN SERPL-MCNC: 16 MG/DL (ref 6–20)
BUN/CREAT SERPL: 12 (ref 12–20)
CALCIUM SERPL-MCNC: 8.9 MG/DL (ref 8.5–10.1)
CALCULATED R AXIS, ECG10: -107 DEGREES
CALCULATED T AXIS, ECG11: 54 DEGREES
CHLORIDE SERPL-SCNC: 112 MMOL/L (ref 97–108)
CO2 SERPL-SCNC: 30 MMOL/L (ref 21–32)
CREAT SERPL-MCNC: 1.38 MG/DL (ref 0.55–1.02)
DIAGNOSIS, 93000: NORMAL
DIFFERENTIAL METHOD BLD: ABNORMAL
EOSINOPHIL # BLD: 0.3 K/UL (ref 0–0.4)
EOSINOPHIL NFR BLD: 4 % (ref 0–7)
ERYTHROCYTE [DISTWIDTH] IN BLOOD BY AUTOMATED COUNT: 19.1 % (ref 11.5–14.5)
GLUCOSE BLD STRIP.AUTO-MCNC: 134 MG/DL (ref 65–100)
GLUCOSE BLD STRIP.AUTO-MCNC: 155 MG/DL (ref 65–100)
GLUCOSE BLD STRIP.AUTO-MCNC: 170 MG/DL (ref 65–100)
GLUCOSE BLD STRIP.AUTO-MCNC: 214 MG/DL (ref 65–100)
GLUCOSE SERPL-MCNC: 99 MG/DL (ref 65–100)
HCT VFR BLD AUTO: 32.2 % (ref 35–47)
HEALTH STATUS, XMCV2T: NORMAL
HGB BLD-MCNC: 9.1 G/DL (ref 11.5–16)
IMM GRANULOCYTES # BLD AUTO: 0 K/UL (ref 0–0.04)
IMM GRANULOCYTES NFR BLD AUTO: 1 % (ref 0–0.5)
LYMPHOCYTES # BLD: 0.9 K/UL (ref 0.8–3.5)
LYMPHOCYTES NFR BLD: 14 % (ref 12–49)
MCH RBC QN AUTO: 23.8 PG (ref 26–34)
MCHC RBC AUTO-ENTMCNC: 28.3 G/DL (ref 30–36.5)
MCV RBC AUTO: 84.3 FL (ref 80–99)
MONOCYTES # BLD: 0.5 K/UL (ref 0–1)
MONOCYTES NFR BLD: 8 % (ref 5–13)
NEUTS SEG # BLD: 4.7 K/UL (ref 1.8–8)
NEUTS SEG NFR BLD: 74 % (ref 32–75)
NRBC # BLD: 0 K/UL (ref 0–0.01)
NRBC BLD-RTO: 0 PER 100 WBC
PLATELET # BLD AUTO: 159 K/UL (ref 150–400)
PMV BLD AUTO: 12 FL (ref 8.9–12.9)
POTASSIUM SERPL-SCNC: 3.6 MMOL/L (ref 3.5–5.1)
Q-T INTERVAL, ECG07: 444 MS
QRS DURATION, ECG06: 146 MS
QTC CALCULATION (BEZET), ECG08: 512 MS
RBC # BLD AUTO: 3.82 M/UL (ref 3.8–5.2)
SARS-COV-2, COV2: NOT DETECTED
SERVICE CMNT-IMP: ABNORMAL
SODIUM SERPL-SCNC: 145 MMOL/L (ref 136–145)
SOURCE, COVRS: NORMAL
SPECIMEN SOURCE, FCOV2M: NORMAL
SPECIMEN TYPE, XMCV1T: NORMAL
VENTRICULAR RATE, ECG03: 80 BPM
WBC # BLD AUTO: 6.4 K/UL (ref 3.6–11)

## 2020-12-14 PROCEDURE — 77010033678 HC OXYGEN DAILY

## 2020-12-14 PROCEDURE — 74011000250 HC RX REV CODE- 250: Performed by: NURSE PRACTITIONER

## 2020-12-14 PROCEDURE — 36415 COLL VENOUS BLD VENIPUNCTURE: CPT

## 2020-12-14 PROCEDURE — 74011250637 HC RX REV CODE- 250/637: Performed by: STUDENT IN AN ORGANIZED HEALTH CARE EDUCATION/TRAINING PROGRAM

## 2020-12-14 PROCEDURE — 94760 N-INVAS EAR/PLS OXIMETRY 1: CPT

## 2020-12-14 PROCEDURE — 74011636637 HC RX REV CODE- 636/637: Performed by: STUDENT IN AN ORGANIZED HEALTH CARE EDUCATION/TRAINING PROGRAM

## 2020-12-14 PROCEDURE — 65270000029 HC RM PRIVATE

## 2020-12-14 PROCEDURE — 94640 AIRWAY INHALATION TREATMENT: CPT

## 2020-12-14 PROCEDURE — 80048 BASIC METABOLIC PNL TOTAL CA: CPT

## 2020-12-14 PROCEDURE — 85025 COMPLETE CBC W/AUTO DIFF WBC: CPT

## 2020-12-14 PROCEDURE — 82962 GLUCOSE BLOOD TEST: CPT

## 2020-12-14 RX ORDER — BUMETANIDE 0.25 MG/ML
2 INJECTION INTRAMUSCULAR; INTRAVENOUS DAILY
Status: DISCONTINUED | OUTPATIENT
Start: 2020-12-14 | End: 2020-12-17

## 2020-12-14 RX ORDER — FLUTICASONE FUROATE AND VILANTEROL 200; 25 UG/1; UG/1
1 POWDER RESPIRATORY (INHALATION) DAILY
Status: DISCONTINUED | OUTPATIENT
Start: 2020-12-14 | End: 2020-12-24 | Stop reason: HOSPADM

## 2020-12-14 RX ORDER — ALBUTEROL SULFATE 90 UG/1
2 AEROSOL, METERED RESPIRATORY (INHALATION)
Status: DISCONTINUED | OUTPATIENT
Start: 2020-12-14 | End: 2020-12-18

## 2020-12-14 RX ADMIN — CEPHALEXIN 500 MG: 250 CAPSULE ORAL at 12:25

## 2020-12-14 RX ADMIN — HYDRALAZINE HYDROCHLORIDE 25 MG: 50 TABLET, FILM COATED ORAL at 18:21

## 2020-12-14 RX ADMIN — ALBUTEROL SULFATE 2 PUFF: 90 AEROSOL, METERED RESPIRATORY (INHALATION) at 13:25

## 2020-12-14 RX ADMIN — TRAMADOL HYDROCHLORIDE 50 MG: 50 TABLET, COATED ORAL at 09:09

## 2020-12-14 RX ADMIN — CEPHALEXIN 500 MG: 250 CAPSULE ORAL at 22:58

## 2020-12-14 RX ADMIN — DABIGATRAN ETEXILATE MESYLATE 150 MG: 150 CAPSULE ORAL at 23:32

## 2020-12-14 RX ADMIN — HYDRALAZINE HYDROCHLORIDE 25 MG: 50 TABLET, FILM COATED ORAL at 09:08

## 2020-12-14 RX ADMIN — Medication 1 TABLET: at 09:10

## 2020-12-14 RX ADMIN — Medication 10 ML: at 13:32

## 2020-12-14 RX ADMIN — INSULIN LISPRO 3 UNITS: 100 INJECTION, SOLUTION INTRAVENOUS; SUBCUTANEOUS at 12:25

## 2020-12-14 RX ADMIN — POTASSIUM CHLORIDE 10 MEQ: 750 TABLET, FILM COATED, EXTENDED RELEASE ORAL at 22:58

## 2020-12-14 RX ADMIN — Medication 10 ML: at 05:18

## 2020-12-14 RX ADMIN — METOPROLOL SUCCINATE 100 MG: 50 TABLET, EXTENDED RELEASE ORAL at 09:08

## 2020-12-14 RX ADMIN — CEPHALEXIN 500 MG: 250 CAPSULE ORAL at 18:20

## 2020-12-14 RX ADMIN — CALCIUM 500 MG: 500 TABLET ORAL at 09:09

## 2020-12-14 RX ADMIN — ACETAMINOPHEN 650 MG: 325 TABLET ORAL at 20:10

## 2020-12-14 RX ADMIN — CYCLOBENZAPRINE 10 MG: 10 TABLET, FILM COATED ORAL at 20:10

## 2020-12-14 RX ADMIN — LISINOPRIL 20 MG: 20 TABLET ORAL at 09:10

## 2020-12-14 RX ADMIN — INSULIN LISPRO 2 UNITS: 100 INJECTION, SOLUTION INTRAVENOUS; SUBCUTANEOUS at 18:21

## 2020-12-14 RX ADMIN — CLONAZEPAM 0.5 MG: 0.5 TABLET ORAL at 22:58

## 2020-12-14 RX ADMIN — PANTOPRAZOLE SODIUM 40 MG: 40 TABLET, DELAYED RELEASE ORAL at 09:09

## 2020-12-14 RX ADMIN — POTASSIUM CHLORIDE 10 MEQ: 750 TABLET, FILM COATED, EXTENDED RELEASE ORAL at 09:09

## 2020-12-14 RX ADMIN — ALBUTEROL SULFATE 2 PUFF: 90 AEROSOL, METERED RESPIRATORY (INHALATION) at 20:33

## 2020-12-14 RX ADMIN — BUMETANIDE 2 MG: 1 TABLET ORAL at 09:08

## 2020-12-14 RX ADMIN — VENLAFAXINE HYDROCHLORIDE 150 MG: 150 CAPSULE, EXTENDED RELEASE ORAL at 09:09

## 2020-12-14 RX ADMIN — POTASSIUM CHLORIDE 10 MEQ: 750 TABLET, FILM COATED, EXTENDED RELEASE ORAL at 18:21

## 2020-12-14 RX ADMIN — ASPIRIN 81 MG CHEWABLE TABLET 81 MG: 81 TABLET CHEWABLE at 09:09

## 2020-12-14 RX ADMIN — ATORVASTATIN CALCIUM 40 MG: 40 TABLET, FILM COATED ORAL at 22:58

## 2020-12-14 RX ADMIN — ISOSORBIDE MONONITRATE 60 MG: 30 TABLET, EXTENDED RELEASE ORAL at 09:09

## 2020-12-14 RX ADMIN — BUMETANIDE 2 MG: 0.25 INJECTION INTRAMUSCULAR; INTRAVENOUS at 15:33

## 2020-12-14 RX ADMIN — PREGABALIN 300 MG: 150 CAPSULE ORAL at 09:09

## 2020-12-14 RX ADMIN — Medication 10 ML: at 22:58

## 2020-12-14 RX ADMIN — DABIGATRAN ETEXILATE MESYLATE 150 MG: 150 CAPSULE ORAL at 09:10

## 2020-12-14 RX ADMIN — PREGABALIN 300 MG: 150 CAPSULE ORAL at 18:21

## 2020-12-14 NOTE — PROGRESS NOTES
End of Shift Note    Bedside shift change report given to [facility], RN (oncoming nurse) by Author Laboy (offgoing nurse). Report included the following information SBAR, MAR and Recent Results    Shift worked:  Night     Shift summary and any significant changes:     Patient admitted as covid r/o. Awaiting PCR results. Patient reports severe back pain on admission. Her pain has been tolerable since coming to the floor. O2 is 95% on 5L and could be titrated down. Concerns for physician to address:  Back pain     Zone phone for oncoming shift:   1230       Activity:  Activity Level: Up with Assistance  Number times ambulated in hallways past shift: 0  Number of times OOB to chair past shift: 0    Cardiac:   Cardiac Monitoring: No      Cardiac Rhythm: Paced    Access:   Current line(s): PIV     Genitourinary:   Urinary status: external catheter    Respiratory:   O2 Device: Nasal cannula  Chronic home O2 use?: NO  Incentive spirometer at bedside: NO     GI:  Last Bowel Movement Date: 12/11/20  Current diet:  DIET DIABETIC CONSISTENT CARB Regular  Passing flatus:Yes  Tolerating current diet: YES       Pain Management:   Patient states pain is manageable on current regimen: NO    Skin:  Preet Score: 15  Interventions: float heels and increase time out of bed    Patient Safety:  Fall Score:  Total Score: 2  Interventions: gripper socks and pt to call before getting OOB       Length of Stay:  Expected LOS: - - -  Actual LOS: [address]

## 2020-12-14 NOTE — PROGRESS NOTES
Problem: Falls - Risk of  Goal: *Absence of Falls  Description: Document Obed Reidccasin Fall Risk and appropriate interventions in the flowsheet. Outcome: Progressing Towards Goal  Note: Fall Risk Interventions:            Medication Interventions: Patient to call before getting OOB    Elimination Interventions: Toileting schedule/hourly rounds              Problem: Pressure Injury - Risk of  Goal: *Prevention of pressure injury  Description: Document Preet Scale and appropriate interventions in the flowsheet. Outcome: Progressing Towards Goal  Note: Pressure Injury Interventions:  Sensory Interventions: Float heels, Keep linens dry and wrinkle-free    Moisture Interventions: Absorbent underpads, Apply protective barrier, creams and emollients    Activity Interventions: Increase time out of bed    Mobility Interventions: Float heels, HOB 30 degrees or less    Nutrition Interventions: Document food/fluid/supplement intake    Friction and Shear Interventions: Lift sheet                Problem: Risk for Spread of Infection  Goal: Prevent transmission of infectious organism to others  Description: Prevent the transmission of infectious organisms to other patients, staff members, and visitors.   Outcome: Progressing Towards Goal

## 2020-12-14 NOTE — PROGRESS NOTES
TRANSFER - OUT REPORT:    Verbal report given to Adri Oneill (name) on Odilia Jump  being transferred to observation(unit) for change in patient condition(COVID negative)       Report consisted of patients Situation, Background, Assessment and   Recommendations(SBAR). Information from the following report(s) SBAR, Kardex, ED Summary, [facility] and Recent Results was reviewed with the receiving nurse. Lines:   Peripheral IV 12/13/20 Right Antecubital (Active)   Site Assessment Clean, dry, & intact 12/14/20 0749   Phlebitis Assessment 0 12/14/20 0749   Infiltration Assessment 0 12/14/20 0749   Dressing Status Clean, dry, & intact 12/14/20 0749   Dressing Type Tape;Transparent 12/14/20 0749   Hub Color/Line Status Green;Capped 12/14/20 0749   Alcohol Cap Used Yes 12/14/20 0749        Opportunity for questions and clarification was provided.       Patient transported with:   O2 @ 5 liters

## 2020-12-14 NOTE — ED NOTES
Spoke to respiratory, was told that they will be in to speak to patient about someone bringing in her cpap for the night, per patient she said she is alright to go without it.

## 2020-12-14 NOTE — PROGRESS NOTES
End of Shift Note    Bedside shift change report given to Afua (oncoming nurse) by Prashant Broderick (offgoing nurse). Report included the following information SBAR, Kardex, Intake/Output and Accordion    Shift worked:  7a-7p     Shift summary and any significant changes:     Admitted from ED. Pt remains stable, and has been resting quietly in bed. Concerns for physician to address:  none     Zone phone for oncoming shift:       Activity:  Activity Level: Up with Assistance  Number times ambulated in hallways past shift: 0  Number of times OOB to chair past shift: 0    Cardiac:   Cardiac Monitoring: No      Cardiac Rhythm: Paced    Access:   Current line(s): PIV     Genitourinary:   Urinary status: external catheter    Respiratory:   O2 Device: Nasal cannula  Chronic home O2 use?: N/A  Incentive spirometer at bedside: NO     GI:  Last Bowel Movement Date: 12/11/20  Current diet:  DIET DIABETIC CONSISTENT CARB Regular; FR 1000ML  Passing flatus: YES  Tolerating current diet: YES       Pain Management:   Patient states pain is manageable on current regimen: YES    Skin:  Preet Score: 16  Interventions: float heels, increase time out of bed, PT/OT consult and internal/external urinary devices    Patient Safety:  Fall Score:  Total Score: 2  Interventions: pt to call before getting OOB       Length of Stay:  Expected LOS: - - -  Actual LOS: [address]

## 2020-12-14 NOTE — PROGRESS NOTES
Pt with likely compression fx of L1. Chronic degenerative spondylosis. Called floor twice to speak to nurse, but they denied calling. Pt is oxygen dependent with chf and ef~20%. There is nothing to do surgically for her as she medically is not a reasonable candidate. IR could be consulted to evaluate for kypho, but with current medical condition not likely possible either to go prone. Brace contra-indicated due to hypoxia. This is actually an established patient for spinal care with [facility]. Last seen within 2 months.

## 2020-12-14 NOTE — ACP (ADVANCE CARE PLANNING)
Advance Care Planning Note      NAME: Anna Oconnell   :  1950   MRN:  [id number]     Date/Time:  2020 12:02 AM    Active Diagnoses:  Hospital Problems  Date Reviewed: 2020          Codes Class Noted POA    Lower back pain ICD-10-CM: M54.5  ICD-9-CM: 724.2  2020 Unknown        Hypoxia ICD-10-CM: R09.02  ICD-9-CM: 799.02  2020 Unknown              These active diagnoses are of sufficient risk that focused discussion on advance care planning is indicated in order to allow the patient to thoughtfully consider personal goals of care, and if situations arise that prevent the ability to personally give input, to ensure appropriate representation of their personal desires for different levels and aggressiveness of care. Discussion:   Code status addressed and wants to be a Full Code. Patient wants central line and vasopressors if needed. Patient would also want a feeding tube, if needed, for nutritional support. Patient  would like to assign  Mimi Carlos  as the surrogate decision maker. Persons present and participating in discussion: Cely Berry MD      Time Spent:   Total time spent face-to-face in education and discussion:   16  minutes.          Anny Bishop MD   Hospitalist

## 2020-12-14 NOTE — PROGRESS NOTES
Hospitalist Progress Note    NAME: Bruna Kennedy   :  1950   MRN:  [id number]     I reviewed pertinent labs and imaging, and discussed /agreed on the plan of care with Dr. Leni Castellanos. Assessment / Plan:  Worsening Back Pain with LE Weakness  Spinal Stenosis   · Reports worsening lower back pain recently, denies recent falls/injury   · CT spine/lumbar  - \"Acute superior endplate fracture of L1 with Route 15% height loss. Multilevel degenerative changes with bilateral neural foraminal narrowing at the L4-5 and L5-S1 levels. \"  · Appreciate Neurosurgery input - not a surgical candidate, consider kyphoplasty when respiratory status will allow for patient to lie flat  · Continue PRN tramadol and cyclobenzaprine     Acute on Chronic Respiratory Failure with Hypoxia related to Acute on Chronic Systolic and Diastolic Congestive Heart Failure O2 82%, Temp 100.6 on admission  · Rapid and PCR COVID results NEGATIVE  · CXR  - Without acute infiltrate or congestion   · CTA Chest - \"Small bilateral pleural effusions with overlying atelectasis. No pulmonary embolus or other acute cardiopulmonary process. \"    · ECHO 2020 - EF 35-40%. Dilated left ventricle. Mild concentric hypertrophy. Severe systolic function. Left ventricular diastolic function.   · Consult Cardiology - patient follows with [facility]   · pBNP 1818 on admission   · Requiring 4-5 LPM NC   · Change bumex to IV   · Continue BB, imdur and lisinopril   · Strict I&Os  · Daily Weights  · 1L Fluid Restriction    Pulmonary Hypertension   Coronary Artery Disease s/p PCI   Ischemic Cardiomyopathy   S/p BiV ICD   Chronic Atrial Fibrillation - V paced currently   Hyperlipidemia   Hypertension   · Continue ASA, atorvastatin, pradaxa, hydralazine, imdur, metoprolol, lisinopril  · Held PTA amlodipine   · Monitor BP - stable     Acute on Chronic LLE Cellulitis Temp 100.6 on admission  Chronic Venous Stasis of BLE   History of Chronic Diabetic Ulcer of Left Foot - healed   · Reports chronic LE cellulitis x 5 years  · LLE with erythema and warm to the touch   · Continue Cephalexin     Chronic Kidney Disease Stage III Baseline 1.5  · Cr at baseline, Cr 1.38 today   · Follow BMP   · Avoid Nephrotoxic Medications     Uncontrolled Type II Diabetes with Diabetic Neuropathy   Morbid Obesity BMI 42  · SSI   · Continue pregabalin   · Hold PTA glimepiride   · Last HgbA1c 8.4 (8/2020) - recheck     Anemia of Chronic Disease Stable, monitor   Asthma Continue albuterol inhaler, breo-ellipta   GERD Continue protonix   Anxiety/Depression Continue venlafaxine-SR, clonazepam     40 or above Morbid obesity / Body mass index is 42.52 kg/m². Code status: Full  Prophylaxis: pradaxa   Recommended Disposition: Home w/Family     Subjective:     Chief Complaint / Reason for Physician Visit  Patient seen at bedside, complains only of back pain. Stated that is what led her to come to the ED. She is no longer able to stand long enough to \"do a load of laundry\". She denies any recent falls or injury. Discussed plan of care and patient verbalized understanding. Discussed with RN events overnight. Review of Systems:  Symptom Y/N Comments  Symptom Y/N Comments   Fever/Chills n   Chest Pain n    Poor Appetite n   Edema n    Cough n   Abdominal Pain n    Sputum n   Joint Pain n    SOB/STEEN y   Pruritis/Rash n    Nausea/vomit n   Tolerating PT/OT     Diarrhea n   Tolerating Diet y    Constipation n   Other y Back pain      Could NOT obtain due to:      Objective:     VITALS:   Last 24hrs VS reviewed since prior progress note.  Most recent are:  Patient Vitals for the past 24 hrs:   Temp Pulse Resp BP SpO2   12/14/20 1325     92 %   12/14/20 1152 98.8 °F (37.1 °C) 80 18 134/64 93 %   12/14/20 0749 98.2 °F (36.8 °C) 80 18 (!) 147/68 93 %   12/14/20 0407 98.3 °F (36.8 °C) 80 18 (!) 154/69 96 %   12/14/20 0200 98.9 °F (37.2 °C) 81 16 133/68    12/14/20 0141  80 18 123/74    12/13/20 2200  80 21 (!) 147/73 91 %   12/13/20 2130  80 19 (!) 164/81 91 %   12/13/20 2100  80 24 (!) 172/73 94 %   12/13/20 2030  80 29 (!) 162/74 92 %   12/13/20 2000  80 24 (!) 166/72 90 %   12/13/20 1937  80  (!) 154/77    12/13/20 1936  80  (!) 154/77    12/13/20 1930  80 25 (!) 154/77 (!) 89 %   12/13/20 1900 98.5 °F (36.9 °C) 80 20 (!) 161/84 (!) 88 %   12/13/20 1856  80 21  (!) 88 %   12/13/20 1853  80 21 (!) 161/80 (!) 86 %   12/13/20 1630  80 14 (!) 148/82 99 %   12/13/20 1600    139/75 98 %   12/13/20 1530    (!) 143/70 97 %   12/13/20 1500    (!) 147/70 95 %   12/13/20 1430    (!) 160/80 91 %       Intake/Output Summary (Last 24 hours) at 12/14/2020 1344  Last data filed at 12/14/2020 1158  Gross per 24 hour   Intake    Output 900 ml   Net -900 ml        I had a face to face encounter and independently examined this patient on 12/14/2020, as outlined below:  PHYSICAL EXAM:  General: WD, WN. Alert, cooperative, morbidly obese  female in no acute distress lying in bed wearing NC   EENT:  EOMI. Anicteric sclerae. MMM  Resp:  LS diminished, no wheezing or rales. No accessory muscle use  CV:  Regular  rhythm, +1 BLE pitting edema   GI:  Soft, obese, Non tender. +Bowel sounds  Neurologic:  Alert and oriented X 3, normal speech,   Psych:   Good insight. Not anxious nor agitated  Skin:  No rashes. No jaundice, erythema to LLE     Reviewed most current lab test results and cultures  YES  Reviewed most current radiology test results   YES  Review and summation of old records today    NO  Reviewed patient's current orders and MAR    YES  PMH/SH reviewed - no change compared to H&P  ________________________________________________________________________  Care Plan discussed with:    Comments   Patient x    Family      RN x    Care Manager     Consultant                        Multidiciplinary team rounds were held today with , nursing, pharmacist and clinical coordinator.   Patient's plan of care was discussed; medications were reviewed and discharge planning was addressed. ________________________________________________________________________  Total NON critical care TIME:  25   Minutes    Total CRITICAL CARE TIME Spent:   Minutes non procedure based      Comments   >50% of visit spent in counseling and coordination of care x    ________________________________________________________________________  Lesa Garduno NP     Procedures: see electronic medical records for all procedures/Xrays and details which were not copied into this note but were reviewed prior to creation of Plan. LABS:  I reviewed today's most current labs and imaging studies.   Pertinent labs include:  Recent Labs     12/14/20  0416 12/13/20  1309   WBC 6.4 7.9   HGB 9.1* 10.1*   HCT 32.2* 35.1    178     Recent Labs     12/14/20  0416 12/13/20  1309    143   K 3.6 3.5   * 110*   CO2 30 31   GLU 99 103*   BUN 16 23*   CREA 1.38* 1.57*   CA 8.9 9.0   ALB  --  3.1*   TBILI  --  0.5   ALT  --  20       Signed: Lesa Garduno NP

## 2020-12-14 NOTE — PROGRESS NOTES
Pharmacy Automatic Direct Oral Anticoagulant Renal Dosing Protocol    Patient currently receiving Dabigatran 150 mg bid with an indication of Afib, paced. Start date: PTA    Major interacting medications:     Platelet inhibitors (dose/frequency):    Labs:  Recent Labs     12/13/20  1309   CREA 1.57*   HGB 10.1*        Wt Readings from Last 1 Encounters:   12/13/20 130.6 kg (287 lb 14.7 oz)        Creatinine Clearance: 70.7 (Actual Body Weight)    Impression/Plan: Dabigatran 150 mg po Bid appropriate for renal function and indication     Pharmacy will follow daily and adjust as appropriate.     Thank you,  Darby Felder, [facility]

## 2020-12-14 NOTE — PROGRESS NOTES
TRANSFER - IN REPORT:    Verbal report received from Yamilet Mccain RN(name) on Agueda Ashton  being received from ED(unit) for routine progression of care      Report consisted of patients Situation, Background, Assessment and   Recommendations(SBAR). Information from the following report(s) SBAR, MAR and Recent Results was reviewed with the receiving nurse. Opportunity for questions and clarification was provided. Assessment completed upon patients arrival to unit and care assumed.

## 2020-12-14 NOTE — ED NOTES
TRANSFER - OUT REPORT:    Verbal report given to Daniel Faulkner RN (name) on Odilia Jump  being transferred to ortho (unit) for routine progression of care       Report consisted of patients Situation, Background, Assessment and   Recommendations(SBAR). Information from the following report(s) SBAR, ED Summary, [facility] and Recent Results was reviewed with the receiving nurse. Lines:   Peripheral IV 12/13/20 Right Antecubital (Active)   Site Assessment Clean, dry, & intact 12/13/20 1321   Phlebitis Assessment 0 12/13/20 1321   Infiltration Assessment 0 12/13/20 1321   Dressing Status Clean, dry, & intact 12/13/20 1321   Dressing Type Transparent;Tape 12/13/20 1321        Opportunity for questions and clarification was provided.

## 2020-12-14 NOTE — ED NOTES
Bedside and Verbal shift change report given to Zeynep Herr and Gino Hendricks RN (oncoming nurse) by Charles Villegas (offgoing nurse). Report included the following information SBAR, Kardex, ED Summary, Intake/Output, MAR, Recent Results and Cardiac Rhythm paced. Pt moved from room 25 to 19 to meet department needs.

## 2020-12-14 NOTE — CONSULTS
Cardiology Consult Note    CC: hypoxia; back pain   Estelle Buam MD  Reason for consult:  H/O CM  Requesting MD:  Sujey Shipman NP  Admit Date: 12/13/2020   Today's Date: 12/14/2020   Cardiologist:  Dr Lu Mcmanus. Cardiac Assessment/Plan:   1) CAD (Prox LAD ISELA 4/2014 for NQWMI), Neg PET for ischemia 9/2018. Min CAD 2/2019.  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 Susan Divine was too expensive. *EF 15-20% 9/2018. EF 30% w/ minimal CAD at cath 2/2019. EF 20-25% (m-mod MR; mod TR) 8/2019. *EF 35-40% 8/2020 (admit for CHF: too much salt). 3) HTN,   4) AFIB: PAfib (RFA 4/2016 and 1/2017), Recurrent/persistent afib 2017/2018: AV node ablation 10/2018. *Chronic AC w/ pradaxa. 5) DM,   6) Dyslipidemia   7) CKD III: Aldactone stopped in 2014. Cr 2 11/2018 (after becky): Cr 1.5 12/2018 & 8/2019; Cr 1.4/gfr 39 12/2020 (Dr. Nuha Spear). 8) [facility] PPM 7/2014 for bradycardia while on therapy for AFib: changed to BiV ICD 10/2018.  9) nephrolithiasis  10) SHAYLA (on CPAP),   11) cholecystectomy 11/2018.  12) LE venous insufficiency:  B SFV ablation 3/2020. Continues to use compression stockings/wraps. Obesity: 240# 2014; 262# 9/2019. 270 to 281# 2020. 279# 8/2020; 281# 12/2020. Imp: stable cardiac status; not grossly overloaded;   Lymphedema compression pumps are pending as noted below.   No plan for invasive cardiac testing; Dispo per primary team.     Hospital Problem List:  Active Problems:    Hypercholesterolemia (1/22/2018)      Ischemic cardiomyopathy (11/13/2018)      Stage 3 chronic kidney disease (11/13/2018)      Obesity (BMI 30-39.9) (4/30/2019)      Acute systolic heart failure ([address] (4/4/2014)      Type 2 diabetes mellitus with diabetic neuropathy, without long-term current use of insulin ([facility] (6/5/2016)      Chronic atrial fibrillation ([facility] (1/1/0641)      Systolic CHF, chronic ([facility] (6/5/2016)      Lower back pain (12/13/2020)      Hypoxia (12/13/2020) ____________________________________________________________________  Melissa Sinclair is a 78 y.o. female presents with Acute respiratory failure with hypoxemia ([facility] [J96.01]. As noted in H&P: \"PRESENT ILLNESS:     Xenia Youssef is a 78 y.o.  female with past medical history significant for CHF, SHAYLA on CPAP, and spinal stenosis who presents with worsening of lower back pain over the last 3 weeks. Patient has chronic lower back pain that is on and off for years. She follows up with an orthopedic doctor and receives steroid injections almost every 2 months. Her last dose was in October. Patient gets some relief with the steroid injection but the pain often recurs. Over the last 3 weeks, her lower back pain has gotten worse to the extent she was unable to walk or get out of bed. The pain has been persistent, 10/10, and occasionally radiates to the right leg. Worse with any kind of movement, no relieving factor. She has tried different analgesics and muscle relaxants with no significant improvement. She denies subjective fever, bowel/bladder incontinence, or lower extremity weakness. Patient's  called EMS for worsening back pain. Per EMS patient was febrile with a temp of 100.7. No record of fever in the ED. In the ED patient was noted to have a sat of 82% on room air. Patient states she has chronic shortness of breath and gets winded even with walking short distance. She follows up with her pulmonologist and she was recently prescribed a pulse oximeter to monitor her oxygen but she has not gotten it yet. She denies recent worsening of shortness of breath, cough, chest pain, myalgia, or fatigue. She does have contact history with her son-in-law who has COVID infection.     Of note, patient also has left lower leg erythema and ulcer which started as a blister about a week ago.  She was prescribed topical ointment/?antibiotics but hasn't seen any improvement. \"    ______________________________________________________________________    The patient reports no chest pain/pressure; no change in chronic dyspnea; no change in LE edema: plan for compression Rx as noted below. No PND, orthopnea, palpitations, pre-syncope, syncope, peripheral edema, or decrease in exercise tolerance. No current complaints. ECG: Afib; Vpacint. Tele: none  CXR: \"No acute findings. \" NO CHF. Notable labs: Neg COVID; Hg 9.1; Cr 1.38 from 1.57 from 1.4-1.5 range. Nl troponin x1; proBNP 1.8k  Nl TSH & LDL 53 8/2020.   _______________________________________________________________  Notable prior cardiac history:  @ OV 10/10/20:  Ms Ronel Block has a h/o:  1) CAD (Prox LAD ISELA 4/2014 for NQWMI), Neg PET for ischemia 9/2018. Min CAD 2/2019.  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 [facility] was too expensive. *EF 15-20% 9/2018. EF 30% w/ minimal CAD at cath 2/2019. EF 20-25% (m-mod MR; mod TR) 8/2019. *EF 35-40% 8/2020 (admit for CHF: too much salt). 3) HTN,   4) AFIB: PAfib (RFA 4/2016 and 1/2017), Recurrent/persistent afib 2017/2018: AV node ablation 10/2018. *Chronic AC w/ pradaxa. 5) DM,   6) Dyslipidemia   7) CKD III: Aldactone stopped in 2014. Cr 2 11/2018 (after becky): Cr 1.5 12/2018 & 8/2019; Cr 1.4/gfr 39 12/2020 (Dr. Janessa Hernandez). 8) St Don PPM 7/2014 for bradycardia while on therapy for AFib: changed to BiV ICD 10/2018.  9) nephrolithiasis  10) SHAYLA (on CPAP),   11) cholecystectomy 11/2018.  12) LE venous insufficiency:  B SFV ablation 3/2020. Continues to use compression stockings/wraps. Obesity: 240# 2014; 262# 9/2019. 270 to 281# 2020. 279# 8/2020; 281# 12/2020.    7/2020:  No chest pain nor change in dyspnea. Using CPAP. Had palpitations x1 since last visit. No falling or bleeding.    8/2020:  [address] last week for CHF; too much sodium. Since discharge, no chest pain nor palpitations.   No change in dyspnea; less LE edema overall, (never fully resolves). Occasional lightheaded if she gets up too quickly. Complains of continued fatigue but does not wish home PT.    12/2020:  No chest pain; decreasing dyspnea. More prominent LE edema/erythema; sees Dr. Bob Hernandes later this week. Patient presents today for 6mo follow up. Continues to have swelling and redness. Reported that she has been doing leg exercises. Patient could not but the compression stockings due to constant fluid leak from left lower extremity. Patient has tried conservative management with compression stocking, exercises and leg elevation for last 6 months. Subsequently patient underwent bilateral GSV ablation. She did not find much improvement in her symptoms. Now patient is having hyperkeratosis of the skin on anterior aspect of the leg, oozing of fluid, she has stage III leg edema    IMPRESSION AND PLAN  01. Lymphedema, not elsewhere classified (I89.0):  Patient has been having chronic edema bilateral lower extremity. She has been following the conservative management with compression stocking, exercises and leg elevation for last 6 months. Subsequently she underwent bilateral GSV ablation. But did not help much with her swelling. Now she is having hyperkeratosis of the skin in the anterior aspect of the legs, chronic wounds, constant oozing of fluid. Part of her leg edema is because of lymphedema. At this point I think lymphedema compression pumps will help with her swelling. 02. Chronic venous insufficiency (I87.2): She has bilateral lower extremity edema also has signs of chronic venous stasis with discoloration of anterior aspect of legs. She also has congestive heart failure and chronic kidney disease. Her etiology for leg swelling is multifactorial.  She has undergone bilateral GSV RF ablation. She has not seen much improvement yet. I could part of it is because of her lymphedema. Will prescribe her lymphedema compression pumps. 03. Atherosclerotic heart disease of native coronary artery without angina pectoris (I25.10): Minimal CAD @ cath 2/2019. We discussed the signs and symptoms of unstable angina, myocardial infarction and malignant arrhythmia. The patient knows to seek immediate medical attention should they occur. 04. Dilated cardiomyopathy (I42.0):  Mixed ischemic/non-ischemic (tachycardia mediated) CM previously. Her EF improved but then has worsened again as noted above. EF 30% 2/2019. The patient has an ICD in place. 05. Chronic combined systolic (congestive) and diastolic (congestive) heart failure (I50.42): The patient is compliant with their CHF regimen. is tolerating the current beta-blocker dose. 06. Longstanding persistent atrial fibrillation (I48.11): As per Dr. Chloe Villasenor, off amiodarone & now s/p AV node ablation. She remains on anticoagulation. 07. Hyp hrt & chr kdny dis w hrt fail and stg 1-4/unsp chr kdny (I13.0):  Finally controlled with addition of Imdur. 08. Mixed hyperlipidemia (E78.2):  Lipid labs drawn by PCP. The patient is tolerating lipid lowering therapy well. 09. Chronic kidney disease, stage 3 (moderate) (N18.3):  Cr 1.24/GFR46 11/2015. Cr 1.32/GFR 43 1/2017. Cr 2 11/2018 after becky. Creatinine 1.5 12/2018. 10. Localized edema (R60.0):  Chronic. She avoids salt. Will evaluate for venous insufficiency. 11. Presence of automatic (implantable) cardiac defibrillator (Z95.810): This is a biventricular device. will continue to be followed in device clinic. 12. Type 2 diabetes mellitus with other specified complication (H88.72):  Lipids & HTN as noted above; DM managed by other MD.   13. Long term (current) use of aspirin (Z79.82): This condition is stable. 15. Long term (current) use of anticoagulants (Z79.01): This condition is stable. Indicated for atrial fibrillation. No bleeding. If she has recurrent falls, she knows to call for re-evaluation of anticoagulation.    15. Body mass index [BMI]40.0-44.9, adult (Z68.41)     ORDERS:  1 Dietary management education, guidance, and counseling    2 Return office visit with Trina Maria MD in 6 Months.         12/10/10 MEDICATION LIST  Medication Sig Desc Non-VCS   acetaminophen 500 mg capsule take 2 capsule by oral route  every 6 hours as needed Y   albuterol sulfate HFA 90 mcg/actuation aerosol inhaler inhale 2 puff by inhalation route  every 4 - 6 hours as needed N   amlodipine 10 mg tablet take 1 tablet by oral route  every day N   aspirin 81 mg tablet,delayed release take 1 tablet by oral route  every day N   ATORVASTATIN CALCIUM 40 MG Tablet TAKE 1 TABLET EVERY EVENING N   Ayr Saline 0.65 % nasal spray aerosol  Y   bumetanide 2 mg tablet take 1 tablet by oral route  every day N   Claritin 10 mg tablet take 1 tablet by oral route  every day Y   clonazepam 0.5 mg tablet take 1 tablet by oral route  every day Y   clotrimazole-betamethasone 1 %-0.05 % topical cream apply by topical route 2 times every day for 2 weeks to the affected and surrounding areas of skin in the morning and evening Y   cyclobenzaprine 10 mg tablet take 1 tablet by oral route 2 times every day Y   glimepiride 2 mg tablet take 1 tablet by oral route  every day Y   hydralazine 25 mg tablet take 1 tablet by oral route 2 times every day with food N   isosorbide mononitrate ER 30 mg tablet,extended release 24 hr take 1 tablet by oral route  twice a day N   lidocaine 4 % topical cream  Y   lisinopril 20 mg tablet take 1 tablet by oral route  every day Y   Lyrica 200 mg capsule take 1 capsule by oral route 2 times every day Y   magnesium 400 mg (as magnesium oxide) tablet take 1 tablet by oral route  every day Y   metoprolol succinate  mg tablet,extended release 24 hr TAKE 1 TABLET EVERY DAY N   nystatin 100,000 unit/gram topical cream apply by topical route 2 times every day to the affected area(s) as needed Y   omeprazole 40 mg capsule,delayed release take 1 capsule by oral route  every day before a meal Y   potassium chloride ER 10 mEq capsule,extended release take 1 Capsule by oral route 3 times daily N   Pradaxa 150 mg capsule take 1 capsule by oral route 2 times every day N   Premarin 0.625 mg/gram vaginal cream insert 0.5 applicatorful by vaginal route  every day cyclically, 3 weeks on and 1 week off Y   Symbicort 160 mcg-4.5 mcg/actuation HFA aerosol inhaler inhale 2 puff by inhalation route 2 times every day in the morning and evening N   Ultram 50 mg tablet take 1 tablet by oral route  every 6 hours as needed Y   venlafaxine  mg capsule,extended release 24 hr take 1 capsule by oral route 2 times every day N   Vitamin D3 1,000 unit capsule take 1 daily N       CARDIAC HISTORY  CAD:  1 NSTEMI [95% Proximal LAD, Resolute (ISELA) to the Proximal LAD.] - 4/8/2014     CHF/CM:  1 Mixed ischemic/tachycardic CM. [Nl TSH.] - 4/2014   2 Ischemic & tachycardic Cardiomyopathy [Echo (EF 0.45) - 06/17/2014, (prev EF 20-30%)] - 6/2014   3 Progressive Cardiomyopathy [Cardiac PET (EF .16) - 09/24/2018, No ischemia noted. EF improved but not normalized; no CAD cath 2/2019.] - 9/2018     ARRHYTHMIA:  1 A Fib [DCCV, Plan: amio started; Eliquis with Effient (change eliquis to asa if NSR after 30 days). ] - 4/8/2014   2 PAF - 8/2010   3 A Fib, recurrent; and 6/2014. [Had marked sinus bradycardia while on bblocker/dig.] - 5/2014   4 Sinus Bradycardia. - 6/2/2014   5 PPM (Devices (Dual Chamber PPM ([facility])) - 7/17/2014) - 7/17/2014   6 PAF [CVR; Eliquis restarted (plavix stopped). ] - 9/2015   7 A Fib [RFA] - 4/2016   8 A Fib [RFA] - 1/2017   9 Chronic A Fib [PPM to BiV device, then AV node ablation. Amiodarone stopped. ] - 10/2018     RISK FACTORS:  1 Diabetes [Diagnosed in 2014]   2 Hypertension   3 Dyslipidemia   4 Tobacco Use: No/never       CARDIOVASCULAR PROCEDURES  Procedure Date Results   Cardiac PET 09/24/2018 EF 0.16 (16%), physiologic apical thinning with no ischemia   Cath 02/22/2019 Minimal CAD   Cath 04/08/2014 95% Proximal LAD, Resolute (ISELA) to the Proximal LAD. DCCV 04/08/2014 Initial Rhythm A Fib, Final Rhythm Sinus   Devices 02/08/2019 Bi-Ventricular ICD ([facility]), 100% Afib. BIV pacing >99% (prior AV node ablation)   Devices 07/17/2014 Dual Chamber PPM (St. [id number])   Echo 08/11/2020 LVE; EF 35-40%; normal RV.  minimal AS (mean 9). PAp 43; at [facility]. Echo 08/17/2019 EF 20-25%; mild-to-moderate MR. Moderate TR. Low normal RV function. At [facility]. Echo 02/06/2019 EF 0.30 (30%), Mild MR, Mild TR, Mild LVH, Aortic Sclerosis, LA Diam 3.9 cm, Est RVSP 40 mmHg, global hypokinesis, worse in the inferior wall & apex. Normal RV. Echo 10/08/2018 EF 0.15 (15%), Aortic Sclerosis, Mild MR, Est RVSP 23 mmHg, EF 15-20%; normal RV. At [facility]. Echo 11/28/2017 EF 0.45 (45%), Mild Global Hypo, Mild TR, Mild LVH, Mild MR, Est RVSP 41 mmHg, Normal RV. (probable trileaflet AoV). Echo 10/30/2015 EF 0.45 (45%), Mild LVH, LA Diam 4.1 cm, Est RVSP 35 mmHg, Normal RV. ?bicuspid AoV; (Prob trileaflet on previous ANGELITO). Echo 06/17/2014 EF 0.45 (45%), EF range 45%-50%, Mild LV dilatation. Mild LV systolic dysfunction. ? Bicuspid AoV but prob trileafet on previous ANGELITO. Echo 04/03/2014 EF 0.20 (20%), global HK with lateral sparing; no valve disease. EKG 12/07/2020 Atrial Fib, Paced Rhythm   Holter 06/09/2014 No Malignant Arrhythmias, Atrial Fib, No correlation with symptoms, (, [address]   Holter 04/30/2014 No Malignant Arrhythmias, Atrial Fib, No correlation with symptoms, \"light or heavy chest\" = afib in 60s. HR  (ave 63). ASx pauses (upto 2.8) while asleep. RFA 01/19/2017 Indication A Fib, Final Rhythm Sinus   RFA  Indication A Fib   Sleep Study  OSAS: Moderate   ANGELITO 04/08/2014 EF 0.35 (35%), No BRAYDON Clot, prob trileaflet AoV. Venous Duplex 04/16/2020 Left:  1. Post venous procedure duplex exam shows no evidence of thrombus in the saphenofemoral junction.   2. Left mid calf positive  5mm in diameter and 2.8s in reflux. Right:  Right distal calf positive  4.1mm in diameter and .53s in reflux. Venous Duplex 2020 Post venous ablation duplex exam shows no evidence of heat induced thrombus in the right common femoral vein. Venous Duplex 2020 Exam was conducted with patient in a high reverse-trendelenburg position. The (right/left/bilateral) great saphenous vein is incompetent (above/below the knee) (throughout the lower extremity) with retrograde flow >0.5seconds. ACTIVE ALLERGIES:  Ingredient Reaction Comment   SACUBITRIL Sadia Salma is too expensive    SULFA (SULFONAMIDE ANTIBIOTICS)     POTASSIUM CLAVULANATE     AMOXICILLIN TRIHYDRATE       ACTIVE INTOLERANCES:  Description Reaction Comment   SACUBITRIL Sadia Salma is too expensive      PROBLEM LIST:  Problem Description Chronic   Benign essential HTN Y   DM type 2 N   Mixed hyperlipidemia Y   A-fib Y   CAD Y       PAST MEDICAL/SURGICAL HISTORY  (Detailed)    Disease/disorder Onset Date Surgical History Date Comments     hysterectomy     A-FIB       CHF       Hyperlipidemia       Hypertension       SHAYLA: moderate. 2014        OBSTETRIC HISTORY:  Not currently pregnant. Family History  (Detailed)  Relationship Family Member Name  Age at Death Condition Onset Age Cause of Death   Brother    Hypertension  N   Mother    PAD  N   Mother    Hypertension  N   Mother    Cardiac arrhythmias  N   Sister    Diabetes mellitus  N     SOCIAL HISTORY  (Detailed)  Tobacco use reviewed. Preferred language is [place]. EDUCATION/EMPLOYMENT/OCCUPATION  Employment History Status Retired Restrictions     retired       retired       retired       retired       retired       retired       retired       retired       retired       retired       MARITAL STATUS/FAMILY/SOCIAL SUPPORT  Currently . Smoking status: Never smoker. ALCOHOL  There is no history of alcohol use.      CAFFEINE  The patient does not use caffeine. .    ______________________________________________________________________  Patient Active Problem List    Diagnosis Date Noted    Lower extremity edema 12/17/2019     Priority: 1 - One    Venous stasis dermatitis of both lower extremities 12/17/2019     Priority: 1 - One    Ingrown toenail of left foot 12/17/2019     Priority: 1 - One    Obesity (BMI 30-39.9) 04/30/2019     Priority: 1 - One    Chronic cholecystitis 11/16/2018     Priority: 1 - One    Ischemic cardiomyopathy 11/13/2018     Priority: 1 - One    CAD (coronary artery disease) 11/13/2018     Priority: 1 - One    Stage 3 chronic kidney disease 11/13/2018     Priority: 1 - One    Anxiety 01/22/2018     Priority: 1 - One    Hypercholesterolemia 01/22/2018     Priority: 1 - One    Long-term use of high-risk medication 01/22/2018     Priority: 1 - One    Cellulitis of left lower leg 06/06/2016     Priority: 1 - One    Lower back pain 12/13/2020    Hypoxia 12/13/2020    Respiratory failure ([facility] 08/09/2020    Type 2 diabetes with nephropathy ([facility] 04/30/2020    Acute asthma exacerbation 08/17/2019    Asthma 01/31/2019    Epigastric abdominal pain 11/13/2018    S/P placement of cardiac pacemaker 11/13/2018    Hypokalemia 10/07/2018    Lower abdominal pain 06/05/2016    Type 2 diabetes mellitus with diabetic neuropathy, without long-term current use of insulin ([address] 06/05/2016    Chronic atrial fibrillation ([facility] 49/81/3370    Systolic CHF, chronic ([address] 06/05/2016    Fever 06/04/2016    UTI (urinary tract infection) 51/41/9516    Acute systolic heart failure ([facility] 04/04/2014    Foot pain, right 11/20/2013    Unspecified hereditary and idiopathic peripheral neuropathy 02/22/2013    HBP (high blood pressure) 02/22/2013    Spinal stenosis, lumbar region, without neurogenic claudication 02/22/2013    Essential and other specified forms of tremor 02/22/2013    IBS (irritable bowel syndrome) 02/22/2013    Depression 02/22/2013    Migraine with aura, without mention of intractable migraine without mention of status migrainosus 02/22/2013    Right knee DJD 02/22/2013    B12 deficiency 02/22/2013    Ataxia 02/22/2013        Past Medical History:   Diagnosis Date    Anxiety 1/22/2018    Arthritis     OA    Asthma     Diabetes ([facility]     Essential hypertension     GERD (gastroesophageal reflux disease)     Hypercholesterolemia 1/22/2018    Hypertension     Ill-defined condition     diverticulitis    Long-term use of high-risk medication 1/22/2018    Neuropathy     Obesity (BMI 30-39.9) 4/30/2019    Other ill-defined conditions(799.89)     IBS, spinal stenosis    Plantar fasciitis     Psychiatric disorder     depression, anxiety    Sleep apnea     uses CPAP    Type 2 diabetes mellitus with diabetic neuropathy, without long-term current use of insulin ([facility] 6/5/2016      Past Surgical History:   Procedure Laterality Date    CARDIAC SURG PROCEDURE UNLIST      stent    COLONOSCOPY N/A 3/14/2018    COLONOSCOPY performed [facility] Amilcar [facility]rizarry [facility] ENDOSCOPY    HX APPENDECTOMY      HX CHOLECYSTECTOMY  11/15/2018    HX HYSTERECTOMY      HX PACEMAKER       Allergies   Allergen Reactions    Sulfa (Sulfonamide Antibiotics) Swelling    Amoxicillin Swelling      Family History   Problem Relation Age of Onset    Arthritis-osteo Mother     Hypertension Mother     High Cholesterol Mother     Crohn's Disease Mother     Heart Disease Mother     Alcohol abuse Father     High Cholesterol Sister     Hypertension Sister     Thyroid Disease Sister     COPD Sister     High Cholesterol Brother     Hypertension Brother     COPD Brother     COPD Child     Inflammatory Bowel Dz Child       Social History     Socioeconomic History    Marital status:      Spouse name: Not on file    Number of children: Not on file    Years of education: Not on file    Highest education level: Not on file   Occupational History    Not on file   Social Needs    Financial resource strain: Not on file    Food insecurity     Worry: Not on file     Inability: Not on file    Transportation needs     Medical: Not on file     Non-medical: Not on file   Tobacco Use    Smoking status: Never Smoker    Smokeless tobacco: Never Used   Substance and Sexual Activity    Alcohol use: No    Drug use: No    Sexual activity: Not on file   Lifestyle    Physical activity     Days per week: Not on file     Minutes per session: Not on file    Stress: Not on file   Relationships    Social connections     Talks on phone: Not on file     Gets together: Not on file     Attends Restorationist service: Not on file     Active member of club or organization: Not on file     Attends meetings of clubs or organizations: Not on file     Relationship status: Not on file    Intimate partner violence     Fear of current or ex partner: Not on file     Emotionally abused: Not on file     Physically abused: Not on file     Forced sexual activity: Not on file   Other Topics Concern    Not on file   Social History Narrative    Not on file     Current Facility-Administered Medications   Medication Dose Route Frequency    albuterol (PROVENTIL HFA, VENTOLIN HFA, PROAIR HFA) inhaler 2 Puff  2 Puff Inhalation Q6H RT    fluticasone-vilanterol (BREO ELLIPTA) 200mcg-25mcg/puff  1 Puff Inhalation DAILY    bumetanide (BUMEX) injection 2 mg  2 mg IntraVENous DAILY    acetaminophen (TYLENOL) tablet 650 mg  650 mg Oral Q6H PRN    ondansetron (ZOFRAN) injection 4 mg  4 mg IntraVENous Q4H PRN    acetaminophen (TYLENOL) tablet 1,000 mg  1,000 mg Oral Q6H PRN    aspirin chewable tablet 81 mg  81 mg Oral DAILY    atorvastatin (LIPITOR) tablet 40 mg  40 mg Oral QHS    calcium carbonate (OS-YEFRI) tablet 500 mg [elemental]  500 mg Oral DAILY    cholecalciferol (VITAMIN D3) (400 Units /10 mcg) tablet 1 Tab  400 Units Oral DAILY    clonazePAM (KlonoPIN) tablet 0.5 mg  0.5 mg Oral QHS    cyclobenzaprine (FLEXERIL) tablet 10 mg  10 mg Oral TID PRN    dabigatran etexilate (PRADAXA) capsule 150 mg  150 mg Oral BID    diphenhydrAMINE (BENADRYL) capsule 25 mg  25 mg Oral BID PRN    hydrALAZINE (APRESOLINE) tablet 25 mg  25 mg Oral BID    isosorbide mononitrate ER (IMDUR) tablet 60 mg  60 mg Oral DAILY    lisinopriL (PRINIVIL, ZESTRIL) tablet 20 mg  20 mg Oral DAILY    metoprolol succinate (TOPROL-XL) XL tablet 100 mg  100 mg Oral DAILY    pantoprazole (PROTONIX) tablet 40 mg  40 mg Oral ACB    potassium chloride SR (KLOR-CON 10) tablet 10 mEq  10 mEq Oral TID    pregabalin (LYRICA) capsule 300 mg  300 mg Oral BID    traMADoL (ULTRAM) tablet 50 mg  50 mg Oral DAILY PRN    venlafaxine-SR (EFFEXOR-XR) capsule 150 mg  150 mg Oral DAILY WITH BREAKFAST    sodium chloride (NS) flush 5-40 mL  5-40 mL IntraVENous Q8H    sodium chloride (NS) flush 5-40 mL  5-40 mL IntraVENous PRN    acetaminophen (TYLENOL) tablet 650 mg  650 mg Oral Q6H PRN    Or    acetaminophen (TYLENOL) suppository 650 mg  650 mg Rectal Q6H PRN    polyethylene glycol (MIRALAX) packet 17 g  17 g Oral DAILY PRN    promethazine (PHENERGAN) tablet 12.5 mg  12.5 mg Oral Q6H PRN    Or    ondansetron (ZOFRAN) injection 4 mg  4 mg IntraVENous Q6H PRN    insulin lispro (HUMALOG) injection   SubCUTAneous AC&HS    glucose chewable tablet 16 g  4 Tab Oral PRN    dextrose (D50W) injection syrg 12.5-25 g  12.5-25 g IntraVENous PRN    glucagon (GLUCAGEN) injection 1 mg  1 mg IntraMUSCular PRN    cephALEXin (KEFLEX) capsule 500 mg  500 mg Oral QID        Prior to Admission Medications:  Prior to Admission medications    Medication Sig Start Date End Date Taking? Authorizing Provider   clonazePAM (KlonoPIN) 0.5 mg tablet TAKE 1 TABLET BY MOUTH NIGHTLY .  DO NOT EXCEED  0.5MG  PER  DAY 11/23/20   Chidi Walls MD   triamcinolone acetonide (KENALOG) 0.1 % topical cream APPLY A THIN FILM OF CREAM EXTERNALLY TO AFFECTED AREA TWICE DAILY 10/8/20   Steph Walls MD   clotrimazole-betamethasone (LOTRISONE) topical cream APPLY TO THE AFFECTED AREA 2 TIMES DAILY 10/8/20   Steph Walls MD   isosorbide mononitrate ER (IMDUR) 60 mg CR tablet Take 1 Tab by mouth daily. 10/7/20   Willam Clarke MD   cyclobenzaprine (FLEXERIL) 10 mg tablet Take 1 tablet by mouth three times daily as needed for muscle spasm 10/7/20   Willam Clarke MD   glimepiride (AMARYL) 2 mg tablet Take 1 tablet by mouth twice daily 10/7/20   Willam Clarke MD   magnesium oxide (MAG-OX) 400 mg tablet Take 1 tablet by mouth twice daily 9/29/20   Willam lCarke MD   loratadine (Claritin) 10 mg tablet Take 10 mg by mouth two (2) times a day. Indications: Patient states she is using Claritin instead of Benadryl, is not taking Benadryl per patient. Provider, Historical   bumetanide (BUMEX) 2 mg tablet Take 1 Tab by mouth daily. 8/12/20   Nevin Barton MD   lisinopriL (PRINIVIL, ZESTRIL) 40 mg tablet Take 0.5 Tabs by mouth daily. 8/12/20   Nevin Barton MD   mupirocin calcium (BACTROBAN) 2 % topical cream Apply  to affected area two (2) times a day. 6/29/20   Willam Clarke MD   omeprazole (PRILOSEC) 40 mg capsule TAKE 1 CAPSULE EVERY DAY 5/31/20   Willam Clarke MD   potassium chloride SR (KLOR-CON 10) 10 mEq tablet Take 1 Tab by mouth three (3) times daily. 5/11/20   Steph Walls MD   hydrALAZINE (APRESOLINE) 25 mg tablet Take 25 mg by mouth two (2) times a day. Provider, Historical   calcium carb/vit D2/minerals (CALTRATE PLUS PO) Take 500 mg by mouth daily. Provider, Historical   SYMBICORT 160-4.5 mcg/actuation HFAA INHALE 2 PUFFS BY MOUTH TWICE DAILY 10/1/19   Steph Walls MD   albuterol (PROVENTIL HFA, VENTOLIN HFA, PROAIR HFA) 90 mcg/actuation inhaler Take 1 Puff by inhalation every four (4) hours as needed for Wheezing.  8/17/19   Regulo Hooker MD diphenhydrAMINE (BENADRYL) 25 mg capsule Take 25 mg by mouth two (2) times a day. Provider, Historical   nystatin (MYCOSTATIN) topical cream Apply  to affected area two (2) times daily as needed for Skin Irritation (Rash). Provider, Historical   traMADol (ULTRAM) 50 mg tablet Take 50 mg by mouth daily as needed for Pain (Used to supplement the Lyrica when lyrica doesnt manage the pain on its own). Provider, Historical   dabigatran etexilate (PRADAXA) 150 mg capsule Take 1 Cap by mouth two (2) times a day. IF NO BLEEDING AT CATH SITE. 2/24/19   Jarad Russell MD   amLODIPine (NORVASC) 2.5 mg tablet Take 1 Tab by mouth daily. Patient taking differently: Take 10 mg by mouth daily. 11/20/18   Kristen Walls MD   metoprolol succinate (TOPROL-XL) 100 mg tablet Take 1 Tab by mouth daily. 11/19/18   Leelee Bravo MD   acetaminophen (TYLENOL EXTRA STRENGTH) 500 mg tablet Take 1,000 mg by mouth every eight (8) hours as needed for Pain (Headache). Provider, Historical   lidocaine (ASPERCREME, LIDOCAINE,) 4 % topical cream Apply  to affected area daily as needed for Pain (Knee pain). Provider, Historical   sodium chloride (AYR SALINE) 0.65 % nasal squeeze bottle 2 Sprays by Both Nostrils route every eight (8) hours as needed. Provider, Historical   conjugated estrogens (PREMARIN) 0.625 mg/gram vaginal cream Insert 0.5 g into vagina two (2) times a week. Tuesdays and Thursdays     Provider, Historical   venlafaxine-SR (EFFEXOR XR) 150 mg capsule Take 150 mg by mouth two (2) times daily (with meals). Provider, Historical   pregabalin (LYRICA) 200 mg capsule Take 300 mg by mouth two (2) times a day. Caden, MD Ernie   cholecalciferol, vitamin d3, (VITAMIN D3) 400 unit cap Take 400 Units by mouth daily. Ernie Negrete MD   aspirin 81 mg chewable tablet Take 81 mg by mouth daily. Provider, Historical   atorvastatin (LIPITOR) 40 mg tablet Take 1 Tab by mouth nightly.  4/9/14   Jarad Russell MD        Review of Symptoms: As noted in H&P:  \" Total of 12 systems reviewed as follows:                                        POSITIVE= underlined text  Negative = text not underlined  General:                     fever, chills, sweats, generalized weakness, weight loss/gain,                                       loss of appetite   Eyes:                           blurred vision, eye pain, loss of vision, double vision  ENT:                            rhinorrhea, pharyngitis   Respiratory:               cough, sputum production, chronic SOB, STEEN, wheezing, pleuritic pain   Cardiology:                chest pain, palpitations, orthopnea, PND, edema, syncope   Gastrointestinal:       abdominal pain , N/V, diarrhea, dysphagia, constipation, bleeding   Genitourinary:           frequency, urgency, dysuria, hematuria, incontinence   Muskuloskeletal :      arthralgia, myalgia, back pain  Hematology:              easy bruising, nose or gum bleeding, lymphadenopathy   Dermatological:         rash, ulceration, pruritis, color change / jaundice  Endocrine:                 hot flashes or polydipsia   Neurological:             headache, dizziness, confusion, focal weakness, paresthesia,                                      Speech difficulties, memory loss, gait difficulty  Psychological:          Feelings of anxiety, depression, agitation\".      24 hr VS reviewed, overall VSSAF  Temp (24hrs), Av.7 °F (37.1 °C), Min:98.2 °F (36.8 °C), Max:99.5 °F (37.5 °C)    Patient Vitals for the past 8 hrs:   Pulse   20 1417 79   20 1152 80     Patient Vitals for the past 8 hrs:   Resp   20 1417 18   20 1152 18     Patient Vitals for the past 8 hrs:   BP   20 1417 136/62   20 1152 134/64       Intake/Output Summary (Last 24 hours) at 2020 1551  Last data filed at 2020 1158  Gross per 24 hour   Intake    Output 900 ml   Net -900 ml         Physical Exam (complete single organ system exam)    Cons: The patient is no distress. Appears stated age. HEENT: Normal conjunctivae and palate. No xanthelasma. Neck: Flat JVP without appreciable HJR. Resp: Normal respiratory effort with clear lungs bilaterally. CV: Irregular rate and rhythm. PMI not palpated. Normal S1,S2  No gallop or rubs appreciated. No murmur appreciated. Intact carotid upstroke bilaterally without appreciated bruits. Abdominal aorta not palpated; no abdominal bruit noted. Intact pedal pulses. Mild peripheral edema; mild erythema. GI: No abd mass noted, soft; no organomegaly noted. Bowel sounds present. Muscular:  No significant kyphosis. Strength WNL for age. Ext: No cyanosis, clubbing, or stigmata of peripheral embolization. Derm: No ulcers or stasis dermatitis of lower extremities. Neuro: Alert and oriented x 3;  Grossly non-focal. Normal mood and affect.      Labs:   Recent Results (from the past 24 hour(s))   SARS-COV-2    Collection Time: 12/13/20  3:54 PM   Result Value Ref Range    Specimen source Nasopharyngeal      SARS-CoV-2 Not detected NOTD      Specimen source NP SWAB     Specimen type NP Swab      Health status Symptomatic Testing     POC EG7    Collection Time: 12/13/20  5:45 PM   Result Value Ref Range    Calcium, ionized (POC) 1.34 (H) 1.12 - 1.32 mmol/L    pH (POC) 7.38 7.35 - 7.45      pCO2 (POC) 46.8 (H) 35.0 - 45.0 MMHG    pO2 (POC) 78 (L) 80 - 100 MMHG    HCO3 (POC) 27.8 (H) 22 - 26 MMOL/L    Base excess (POC) 3 mmol/L    sO2 (POC) 95 92 - 97 %    Site RIGHT RADIAL      Device: ROOM AIR      Allens test (POC) YES      Specimen type (POC) ARTERIAL     GLUCOSE, POC    Collection Time: 12/13/20  9:21 PM   Result Value Ref Range    Glucose (POC) 142 (H) 65 - 100 mg/dL    Performed by Sherri David (WINSTON)    METABOLIC PANEL, BASIC    Collection Time: 12/14/20  4:16 AM   Result Value Ref Range    Sodium 145 136 - 145 mmol/L    Potassium 3.6 3.5 - 5.1 mmol/L    Chloride 112 (H) 97 - 108 mmol/L CO2 30 21 - 32 mmol/L    Anion gap 3 (L) 5 - 15 mmol/L    Glucose 99 65 - 100 mg/dL    BUN 16 6 - 20 MG/DL    Creatinine 1.38 (H) 0.55 - 1.02 MG/DL    BUN/Creatinine ratio 12 12 - 20      GFR est AA 46 (L) >60 ml/min/1.73m2    GFR est non-AA 38 (L) >60 ml/min/1.73m2    Calcium 8.9 8.5 - 10.1 MG/DL   CBC WITH AUTOMATED DIFF    Collection Time: 12/14/20  4:16 AM   Result Value Ref Range    WBC 6.4 3.6 - 11.0 K/uL    RBC 3.82 3.80 - 5.20 M/uL    HGB 9.1 (L) 11.5 - 16.0 g/dL    HCT 32.2 (L) 35.0 - 47.0 %    MCV 84.3 80.0 - 99.0 FL    MCH 23.8 (L) 26.0 - 34.0 PG    MCHC 28.3 (L) 30.0 - 36.5 g/dL    RDW 19.1 (H) 11.5 - 14.5 %    PLATELET 575 027 - 808 K/uL    MPV 12.0 8.9 - 12.9 FL    NRBC 0.0 0  WBC    ABSOLUTE NRBC 0.00 0.00 - 0.01 K/uL    NEUTROPHILS 74 32 - 75 %    LYMPHOCYTES 14 12 - 49 %    MONOCYTES 8 5 - 13 %    EOSINOPHILS 4 0 - 7 %    BASOPHILS 1 0 - 1 %    IMMATURE GRANULOCYTES 1 (H) 0.0 - 0.5 %    ABS. NEUTROPHILS 4.7 1.8 - 8.0 K/UL    ABS. LYMPHOCYTES 0.9 0.8 - 3.5 K/UL    ABS. MONOCYTES 0.5 0.0 - 1.0 K/UL    ABS. EOSINOPHILS 0.3 0.0 - 0.4 K/UL    ABS. BASOPHILS 0.0 0.0 - 0.1 K/UL    ABS. IMM.  GRANS. 0.0 0.00 - 0.04 K/UL    DF AUTOMATED     GLUCOSE, POC    Collection Time: 12/14/20  8:36 AM   Result Value Ref Range    Glucose (POC) 134 (H) 65 - 100 mg/dL    Performed by Arie Vasquez RN    GLUCOSE, POC    Collection Time: 12/14/20 11:50 AM   Result Value Ref Range    Glucose (POC) 214 (H) 65 - 100 mg/dL    Performed by Reza Carranza (PCT)      Recent Labs     12/13/20  1309   TROIQ <0.05       Recent Labs     12/14/20  0416 12/13/20  1309    143   K 3.6 3.5   * 110*   CO2 30 31   BUN 16 23*   CREA 1.38* 1.57*   GFRAA 46* 40*   GLU 99 103*   CA 8.9 9.0   ALB  --  3.1*   WBC 6.4 7.9   HGB 9.1* 10.1*   HCT 32.2* 35.1    178       Karthik Kong MD

## 2020-12-14 NOTE — ED NOTES
Dr. Gary Moffett notified pt decrease in pulse oximetry reading and increase in O2 flow to 6L NC. Dr. Gary Moffett to bedside to assess.

## 2020-12-14 NOTE — PROGRESS NOTES
Pharmacy Automatic Renal Dosing Protocol - Antimicrobials  Indication for Antimicrobials: SSTI    Current Regimen of Each Antimicrobial:  Cephalexin 500 mg po four times daily  (Start Date ; Day # 1 of 5)    Previous Antimicrobial Therapy:    Significant Cultures:       Radiology / Imaging results: (X-ray, CT scan or MRI):     Paralysis, amputations, malnutrition: NA    Labs:  Recent Labs     20  1309   CREA 1.57*   BUN 23*   WBC 7.9     Temp (24hrs), Av.6 °F (37 °C), Min:98.5 °F (36.9 °C), Max:98.7 °F (37.1 °C)      Is the Patient on Dialysis? No    Creatinine Clearance (mL/min):   CrCl (Actual Body Weight): 70.7  CrCl (Adjusted Body Weight): 49.8  CrCl (Ideal Body Weight): 35.8    Impression/Plan:   · Adjusted body wt Crcl = 50ml/min, dose appropriate  · Antimicrobial stop date: 5 days     Pharmacy will follow daily and adjust medications as appropriate for renal function and/or serum levels.     Thank you,  Liane Troncoso, [facility]

## 2020-12-15 LAB
ANION GAP SERPL CALC-SCNC: 2 MMOL/L (ref 5–15)
BASOPHILS # BLD: 0 K/UL (ref 0–0.1)
BASOPHILS NFR BLD: 0 % (ref 0–1)
BUN SERPL-MCNC: 17 MG/DL (ref 6–20)
BUN/CREAT SERPL: 11 (ref 12–20)
CALCIUM SERPL-MCNC: 8.9 MG/DL (ref 8.5–10.1)
CHLORIDE SERPL-SCNC: 109 MMOL/L (ref 97–108)
CO2 SERPL-SCNC: 32 MMOL/L (ref 21–32)
CREAT SERPL-MCNC: 1.54 MG/DL (ref 0.55–1.02)
DIFFERENTIAL METHOD BLD: ABNORMAL
EOSINOPHIL # BLD: 0.2 K/UL (ref 0–0.4)
EOSINOPHIL NFR BLD: 4 % (ref 0–7)
ERYTHROCYTE [DISTWIDTH] IN BLOOD BY AUTOMATED COUNT: 19.1 % (ref 11.5–14.5)
EST. AVERAGE GLUCOSE BLD GHB EST-MCNC: 126 MG/DL
GLUCOSE BLD STRIP.AUTO-MCNC: 121 MG/DL (ref 65–100)
GLUCOSE BLD STRIP.AUTO-MCNC: 149 MG/DL (ref 65–100)
GLUCOSE BLD STRIP.AUTO-MCNC: 163 MG/DL (ref 65–100)
GLUCOSE BLD STRIP.AUTO-MCNC: 183 MG/DL (ref 65–100)
GLUCOSE SERPL-MCNC: 108 MG/DL (ref 65–100)
HBA1C MFR BLD: 6 % (ref 4–5.6)
HCT VFR BLD AUTO: 31 % (ref 35–47)
HGB BLD-MCNC: 8.9 G/DL (ref 11.5–16)
IMM GRANULOCYTES # BLD AUTO: 0.1 K/UL (ref 0–0.04)
IMM GRANULOCYTES NFR BLD AUTO: 1 % (ref 0–0.5)
LYMPHOCYTES # BLD: 0.9 K/UL (ref 0.8–3.5)
LYMPHOCYTES NFR BLD: 17 % (ref 12–49)
MCH RBC QN AUTO: 24.3 PG (ref 26–34)
MCHC RBC AUTO-ENTMCNC: 28.7 G/DL (ref 30–36.5)
MCV RBC AUTO: 84.7 FL (ref 80–99)
MONOCYTES # BLD: 0.4 K/UL (ref 0–1)
MONOCYTES NFR BLD: 8 % (ref 5–13)
NEUTS SEG # BLD: 3.7 K/UL (ref 1.8–8)
NEUTS SEG NFR BLD: 70 % (ref 32–75)
NRBC # BLD: 0 K/UL (ref 0–0.01)
NRBC BLD-RTO: 0 PER 100 WBC
PLATELET # BLD AUTO: 153 K/UL (ref 150–400)
PMV BLD AUTO: 12.2 FL (ref 8.9–12.9)
POTASSIUM SERPL-SCNC: 2.9 MMOL/L (ref 3.5–5.1)
RBC # BLD AUTO: 3.66 M/UL (ref 3.8–5.2)
RBC MORPH BLD: ABNORMAL
SERVICE CMNT-IMP: ABNORMAL
SODIUM SERPL-SCNC: 143 MMOL/L (ref 136–145)
WBC # BLD AUTO: 5.3 K/UL (ref 3.6–11)

## 2020-12-15 PROCEDURE — 74011250637 HC RX REV CODE- 250/637: Performed by: NURSE PRACTITIONER

## 2020-12-15 PROCEDURE — 74011636637 HC RX REV CODE- 636/637: Performed by: STUDENT IN AN ORGANIZED HEALTH CARE EDUCATION/TRAINING PROGRAM

## 2020-12-15 PROCEDURE — 85025 COMPLETE CBC W/AUTO DIFF WBC: CPT

## 2020-12-15 PROCEDURE — 65270000029 HC RM PRIVATE

## 2020-12-15 PROCEDURE — 74011250636 HC RX REV CODE- 250/636: Performed by: NURSE PRACTITIONER

## 2020-12-15 PROCEDURE — 94640 AIRWAY INHALATION TREATMENT: CPT

## 2020-12-15 PROCEDURE — 82962 GLUCOSE BLOOD TEST: CPT

## 2020-12-15 PROCEDURE — 80048 BASIC METABOLIC PNL TOTAL CA: CPT

## 2020-12-15 PROCEDURE — 94760 N-INVAS EAR/PLS OXIMETRY 1: CPT

## 2020-12-15 PROCEDURE — 83036 HEMOGLOBIN GLYCOSYLATED A1C: CPT

## 2020-12-15 PROCEDURE — 74011000250 HC RX REV CODE- 250: Performed by: NURSE PRACTITIONER

## 2020-12-15 PROCEDURE — 36415 COLL VENOUS BLD VENIPUNCTURE: CPT

## 2020-12-15 PROCEDURE — 2709999900 HC NON-CHARGEABLE SUPPLY

## 2020-12-15 PROCEDURE — 74011250637 HC RX REV CODE- 250/637: Performed by: STUDENT IN AN ORGANIZED HEALTH CARE EDUCATION/TRAINING PROGRAM

## 2020-12-15 PROCEDURE — 77010033678 HC OXYGEN DAILY

## 2020-12-15 RX ORDER — POTASSIUM CHLORIDE 7.45 MG/ML
10 INJECTION INTRAVENOUS
Status: COMPLETED | OUTPATIENT
Start: 2020-12-15 | End: 2020-12-15

## 2020-12-15 RX ADMIN — DABIGATRAN ETEXILATE MESYLATE 150 MG: 150 CAPSULE ORAL at 17:34

## 2020-12-15 RX ADMIN — VENLAFAXINE HYDROCHLORIDE 150 MG: 150 CAPSULE, EXTENDED RELEASE ORAL at 07:11

## 2020-12-15 RX ADMIN — CALCIUM 500 MG: 500 TABLET ORAL at 08:40

## 2020-12-15 RX ADMIN — PREGABALIN 300 MG: 150 CAPSULE ORAL at 08:40

## 2020-12-15 RX ADMIN — ALBUTEROL SULFATE 2 PUFF: 90 AEROSOL, METERED RESPIRATORY (INHALATION) at 03:03

## 2020-12-15 RX ADMIN — POTASSIUM CHLORIDE 10 MEQ: 10 INJECTION, SOLUTION INTRAVENOUS at 12:16

## 2020-12-15 RX ADMIN — ALBUTEROL SULFATE 2 PUFF: 90 AEROSOL, METERED RESPIRATORY (INHALATION) at 20:03

## 2020-12-15 RX ADMIN — DABIGATRAN ETEXILATE MESYLATE 150 MG: 150 CAPSULE ORAL at 10:43

## 2020-12-15 RX ADMIN — Medication 10 ML: at 07:11

## 2020-12-15 RX ADMIN — POTASSIUM CHLORIDE 10 MEQ: 750 TABLET, FILM COATED, EXTENDED RELEASE ORAL at 17:34

## 2020-12-15 RX ADMIN — ISOSORBIDE MONONITRATE 60 MG: 30 TABLET, EXTENDED RELEASE ORAL at 08:40

## 2020-12-15 RX ADMIN — FLUTICASONE FUROATE AND VILANTEROL TRIFENATATE 1 PUFF: 200; 25 POWDER RESPIRATORY (INHALATION) at 08:29

## 2020-12-15 RX ADMIN — POTASSIUM CHLORIDE 10 MEQ: 10 INJECTION, SOLUTION INTRAVENOUS at 09:41

## 2020-12-15 RX ADMIN — PREGABALIN 300 MG: 150 CAPSULE ORAL at 17:33

## 2020-12-15 RX ADMIN — CYCLOBENZAPRINE 10 MG: 10 TABLET, FILM COATED ORAL at 20:23

## 2020-12-15 RX ADMIN — ASPIRIN 81 MG CHEWABLE TABLET 81 MG: 81 TABLET CHEWABLE at 08:40

## 2020-12-15 RX ADMIN — TRAMADOL HYDROCHLORIDE 50 MG: 50 TABLET, COATED ORAL at 07:10

## 2020-12-15 RX ADMIN — POTASSIUM CHLORIDE 10 MEQ: 10 INJECTION, SOLUTION INTRAVENOUS at 08:46

## 2020-12-15 RX ADMIN — PANTOPRAZOLE SODIUM 40 MG: 40 TABLET, DELAYED RELEASE ORAL at 07:09

## 2020-12-15 RX ADMIN — HYDRALAZINE HYDROCHLORIDE 25 MG: 50 TABLET, FILM COATED ORAL at 17:34

## 2020-12-15 RX ADMIN — POTASSIUM CHLORIDE 10 MEQ: 10 INJECTION, SOLUTION INTRAVENOUS at 11:14

## 2020-12-15 RX ADMIN — Medication 10 ML: at 15:03

## 2020-12-15 RX ADMIN — POTASSIUM CHLORIDE 10 MEQ: 750 TABLET, FILM COATED, EXTENDED RELEASE ORAL at 21:29

## 2020-12-15 RX ADMIN — CEPHALEXIN 500 MG: 250 CAPSULE ORAL at 17:34

## 2020-12-15 RX ADMIN — ALUMINUM HYDROXIDE AND MAGNESIUM HYDROXIDE 15 ML: 200; 200 SUSPENSION ORAL at 17:36

## 2020-12-15 RX ADMIN — POTASSIUM CHLORIDE 10 MEQ: 10 INJECTION, SOLUTION INTRAVENOUS at 13:32

## 2020-12-15 RX ADMIN — Medication 10 ML: at 21:35

## 2020-12-15 RX ADMIN — METOPROLOL SUCCINATE 100 MG: 50 TABLET, EXTENDED RELEASE ORAL at 08:41

## 2020-12-15 RX ADMIN — BUMETANIDE 2 MG: 0.25 INJECTION INTRAMUSCULAR; INTRAVENOUS at 09:17

## 2020-12-15 RX ADMIN — HYDRALAZINE HYDROCHLORIDE 25 MG: 50 TABLET, FILM COATED ORAL at 08:40

## 2020-12-15 RX ADMIN — ALBUTEROL SULFATE: 90 AEROSOL, METERED RESPIRATORY (INHALATION) at 13:43

## 2020-12-15 RX ADMIN — LISINOPRIL 20 MG: 20 TABLET ORAL at 08:41

## 2020-12-15 RX ADMIN — Medication 1 TABLET: at 08:41

## 2020-12-15 RX ADMIN — INSULIN LISPRO 2 UNITS: 100 INJECTION, SOLUTION INTRAVENOUS; SUBCUTANEOUS at 17:34

## 2020-12-15 RX ADMIN — POTASSIUM CHLORIDE 10 MEQ: 10 INJECTION, SOLUTION INTRAVENOUS at 07:10

## 2020-12-15 RX ADMIN — INSULIN LISPRO 2 UNITS: 100 INJECTION, SOLUTION INTRAVENOUS; SUBCUTANEOUS at 12:16

## 2020-12-15 RX ADMIN — CEPHALEXIN 500 MG: 250 CAPSULE ORAL at 08:41

## 2020-12-15 RX ADMIN — ALBUTEROL SULFATE 2 PUFF: 90 AEROSOL, METERED RESPIRATORY (INHALATION) at 08:28

## 2020-12-15 RX ADMIN — CLONAZEPAM 0.5 MG: 0.5 TABLET ORAL at 21:29

## 2020-12-15 RX ADMIN — CEPHALEXIN 500 MG: 250 CAPSULE ORAL at 12:16

## 2020-12-15 RX ADMIN — POTASSIUM CHLORIDE 10 MEQ: 750 TABLET, FILM COATED, EXTENDED RELEASE ORAL at 08:41

## 2020-12-15 RX ADMIN — CEPHALEXIN 500 MG: 250 CAPSULE ORAL at 21:29

## 2020-12-15 NOTE — WOUND CARE
Wound Care Nurse Consult: consult from hospitalist NP stating \" left leg wound\" Patient is a 77 y/o CF admitted for worsening back pain for spinal stenosis. Chart reviewed and patients LLE asessed. Past Medical History:  
Diagnosis Date  Anxiety 1/22/2018  Arthritis OA  Asthma  Diabetes ([address]  Essential hypertension  GERD (gastroesophageal reflux disease)  Hypercholesterolemia 1/22/2018  Hypertension  Ill-defined condition   
 diverticulitis  Long-term use of high-risk medication 1/22/2018  Neuropathy  Obesity (BMI 30-39.9) 4/30/2019  Other ill-defined conditions(799.89) IBS, spinal stenosis  Plantar fasciitis  Psychiatric disorder   
 depression, anxiety  Sleep apnea   
 uses CPAP  Type 2 diabetes mellitus with diabetic neuropathy, without long-term current use of insulin ([facility] [address] 6/5/2016 Patient has LLE cellulitis looking skin around a few partial thickness venous stasis sores: WOUND POA CONDITIONS Wound Leg lower Left cellulitis/oozing open area 12/15/20 (Active) Wound Image   12/15/20 1023 Wound Etiology Venous 12/15/20 1023 Dressing Status New dressing applied 12/15/20 1023 Cleansed Cleansed with saline 12/15/20 1023 Dressing/Treatment Xeroform 12/15/20 1023 Dressing Change Due 12/16/20 12/15/20 1023 Wound Assessment Ruptured blister 12/15/20 1023 Drainage Amount Scant 12/15/20 1023 Drainage Description Serous 12/15/20 1023 Wound Odor None 12/15/20 1023 Edith-Wound/Incision Assessment Blanchable erythema 12/15/20 1023 Edges Flat/open edges 12/15/20 1023 Wound Thickness Description Partial thickness 12/15/20 1023 Number of days: 1 Spoke with NP, Piedad, who states she was started on Keflex yesterday for the cellulitis. Recommend: LLE venous stasis wounds: daily x5 days, cleanse with NS moist 4x4. Cover with a piece of Xeroform gauze, abd pad and secure with gauze jared. Float heels Elevate BLE in and out of bed.  
 
Brooke Leonard RN, [employer]

## 2020-12-15 NOTE — PROGRESS NOTES
I reviewed pertinent labs and imaging, and discussed /agreed on the plan of care with Dr. Erick Childers      Hospitalist Progress Note    NAME: Agueda Ashton   :  1950   MRN:  [id number]       Assessment / Plan:  Hypokalemia   - K 2.9 replete with IV   - monitor in am     Worsening Back Pain with LE Weakness  Spinal Stenosis   · CT spine/lumbar  - \"Acute superior endplate fracture of L1 with Route 15% height loss. Multilevel degenerative changes with bilateral neural foraminal narrowing at the L4-5 and L5-S1 levels. \"  · Appreciate Neurosurgery input - not a surgical candidate, consider kyphoplasty when respiratory status will allow for patient to lie flat  · Continue PRN tramadol and cyclobenzaprine      Acute on Chronic Respiratory Failure with Hypoxia related to Acute on Chronic Systolic and Diastolic Congestive Heart Failure O2 82%, Temp 100.6 on admission  · Rapid and PCR COVID results NEGATIVE  · CXR  - Without acute infiltrate or congestion   · CTA Chest - \"Small bilateral pleural effusions with overlying atelectasis. No pulmonary embolus or other acute cardiopulmonary process. \"    · ECHO 2020 - EF 35-40%. Dilated left ventricle. Mild concentric hypertrophy. Severe systolic function. Left ventricular diastolic function.   · Appreciate  Cardiology - no plans for any invasive cardiac work up ,not grossly overloaded   · Still requiring O2 now at 3L NC cont to wean   · Change bumex to IV   · Continue BB, imdur and lisinopril   · Strict I&Os  · Daily Weights  · 1L Fluid Restriction     Pulmonary Hypertension   Coronary Artery Disease s/p PCI   Ischemic Cardiomyopathy   S/p BiV ICD   Chronic Atrial Fibrillation - V paced currently   Hyperlipidemia   Hypertension   · Continue ASA, atorvastatin, pradaxa, hydralazine, imdur, metoprolol, lisinopril  · Held PTA amlodipine   · Monitor BP - stable      Acute on Chronic LLE Cellulitis Temp 100.6 on admission  Chronic Venous Stasis of BLE   History of Chronic Diabetic Ulcer of Left Foot - healed   · Reports chronic LE cellulitis x 5 years  · LLE with erythema and venous stasis ulcer   · Continue Cephalexin for cellulitis   · Wound care consult for weeping ulcer      Chronic Kidney Disease Stage III Baseline 1.5  · Cr at baseline, Cr 1.38 today   · Follow BMP   · Avoid Nephrotoxic Medications      Uncontrolled Type II Diabetes with Diabetic Neuropathy   Morbid Obesity BMI 42  · SSI   · Continue pregabalin   · Hold PTA glimepiride   · Last HgbA1c 8.4 (8/2020) - now at 6.0      Anemia of Chronic Disease Stable, monitor   Asthma Continue albuterol inhaler, breo-ellipta   GERD Continue protonix   Anxiety/Depression Continue venlafaxine-SR, clonazepam           / Body mass index is 42.52 kg/m². Code status: Full  Prophylaxis: Pradaxa  Recommended Disposition: Home w/Family     Subjective:     Chief Complaint / Reason for Physician Visit  \"I can finally eat \". Discussed with RN events overnight. Pt seen this morning after Hida scan . States pain resolved denies any nausea     Review of Systems:  Symptom Y/N Comments  Symptom Y/N Comments   Fever/Chills n   Chest Pain n    Poor Appetite n   Edema n    Cough n   Abdominal Pain n    Sputum n   Joint Pain n    SOB/STEEN n   Pruritis/Rash n    Nausea/vomit    Tolerating PT/OT     Diarrhea n   Tolerating Diet n    Constipation    Other       Could NOT obtain due to:      Objective:     VITALS:   Last 24hrs VS reviewed since prior progress note.  Most recent are:  Patient Vitals for the past 24 hrs:   Temp Pulse Resp BP SpO2   12/15/20 0830     94 %   12/15/20 0826 98.1 °F (36.7 °C) 80 17 (!) 173/77    12/15/20 0434  80      12/15/20 0304     94 %   12/15/20 0301     94 %   12/14/20 2345 98.1 °F (36.7 °C) 83 18 132/68 96 %   12/14/20 2034     95 %   12/14/20 1956 (!) 100.5 °F (38.1 °C) 80 18 130/62 96 %   12/14/20 1417 99.5 °F (37.5 °C) 79 18 136/62 98 %   12/14/20 1325     92 %   12/14/20 1152 98.8 °F (37.1 °C) 80 18 134/64 93 %       Intake/Output Summary (Last 24 hours) at 12/15/2020 0911  Last data filed at 12/15/2020 0365  Gross per 24 hour   Intake 600 ml   Output 900 ml   Net -300 ml        I had a face to face encounter and independently examined this patient on 12/15/2020, as outlined below:  PHYSICAL EXAM:  General: Obese . Alert, cooperative, no acute distress    EENT:  EOMI. Anicteric sclerae. MMM  Resp:  Coarse BS  no wheezing or rales. No accessory muscle use  CV:  S1S2  Valentino Le 1+  edema  GI:  Soft, Non distended, Non tender. +Bowel sounds  Neurologic:  Alert and oriented X 3, normal speech,   Psych:   . Not anxious nor agitated  Skin:  No rashes. No jaundice venous stasis ulcers on L leg , erythema     Reviewed most current lab test results and cultures  YES  Reviewed most current radiology test results   YES  Review and summation of old records today    NO  Reviewed patient's current orders and MAR    YES  PMH/SH reviewed - no change compared to H&P  ________________________________________________________________________  Care Plan discussed with:    Comments   Patient x    Family      RN x    Care Manager     Consultant                       x Multidiciplinary team rounds were held today with , nursing, pharmacist and clinical coordinator. Patient's plan of care was discussed; medications were reviewed and discharge planning was addressed. ________________________________________________________________________  Total NON critical care TIME:  30  Minutes    Total CRITICAL CARE TIME Spent:   Minutes non procedure based      Comments   >50% of visit spent in counseling and coordination of care     ________________________________________________________________________  Ly Parkinson. Shanti Cerda NP     Procedures: see electronic medical records for all procedures/Xrays and details which were not copied into this note but were reviewed prior to creation of Plan.       LABS:  I reviewed today's most current labs and imaging studies. Pertinent labs include:  Recent Labs     12/15/20  0431 12/14/20  0416 12/13/20  1309   WBC 5.3 6.4 7.9   HGB 8.9* 9.1* 10.1*   HCT 31.0* 32.2* 35.1    159 178     Recent Labs     12/15/20  0431 12/14/20 0416 12/13/20  1309    145 143   K 2.9* 3.6 3.5   * 112* 110*   CO2 32 30 31   * 99 103*   BUN 17 16 23*   CREA 1.54* 1.38* 1.57*   CA 8.9 8.9 9.0   ALB  --   --  3.1*   TBILI  --   --  0.5   ALT  --   --  20       Signed: Ana Lester NP

## 2020-12-15 NOTE — PROGRESS NOTES
Pt's IV site is painful, swollen and warm. All IV meds are being held. Called PICC team to start a new IV.

## 2020-12-15 NOTE — PROGRESS NOTES
Cardiology Progress Note  12/15/2020     Admit Date: 12/13/2020  Admit Diagnosis: Acute respiratory failure with hypoxemia (HCC) [J96.01]  CC: none currently  Cardiologist:    [address] Cardiac Assessment/Plan:    1) CAD (Prox LAD ISELA 4/2014 for NQWMI), Neg PET for ischemia 9/2018. Min CAD 2/2019.  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 Gretchen Due was too expensive. *EF 15-20% 9/2018. EF 30% w/ minimal CAD at cath 2/2019. EF 20-25% (m-mod MR; mod TR) 8/2019. *EF 35-40% 8/2020 (admit for CHF: too much salt). 3) HTN,   4) AFIB: PAfib (RFA 4/2016 and 1/2017), Recurrent/persistent afib 2017/2018: AV node ablation 10/2018. *Chronic AC w/ pradaxa. 5) DM,   6) Dyslipidemia   7) CKD III: Aldactone stopped in 2014. Cr 2 11/2018 (after becky): Cr 1.5 12/2018 & 8/2019; Cr 1.4/gfr 39 12/2020 (Dr. Melissa Holder). 8) [facility] Don PPM 7/2014 for bradycardia while on therapy for AFib: changed to BiV ICD 10/2018.  9) nephrolithiasis  10) SHAYLA (on CPAP),   11) cholecystectomy 11/2018.  12) LE venous insufficiency:  B SFV ablation 3/2020. Continues to use compression stockings/wraps. Obesity: 240# 2014; 262# 9/2019. 270 to 281# 2020. 279# 8/2020; 281# 12/2020.     Imp 12/14: stable cardiac status; not grossly overloaded;   Lymphedema compression pumps are pending as noted below. No plan for invasive cardiac testing; Dispo per primary team.      12/15: BPs 120-170; HR 80s; 94% 4L. -300 per I/O. Wt 287# to 283#. Hg 8.9; K 2.9; Cr 1.54. Cardiac meds; asa 81; lipitor 40; bumex 2 IV qday; pradaxa 150 bid; hydralazine 25 bid; Imdur 60; lisinopril 20; toprol ;     HypoK being Rxed by primary team; Cont IV bumex for now: PO soon. GI cocktail for epigastric/abd discomfort.   Ortho/dispo per primary team.      Hospital Problem List:  Active Problems:    Hypercholesterolemia (1/22/2018)       Ischemic cardiomyopathy (11/13/2018)       Stage 3 chronic kidney disease (11/13/2018)       Obesity (BMI 30-39.9) (4/30/2019)       Acute systolic heart failure ([address] (4/4/2014)       Type 2 diabetes mellitus with diabetic neuropathy, without long-term current use of insulin ([address] (6/5/2016)       Chronic atrial fibrillation ([address] (6/8/2890)       Systolic CHF, chronic ([address] (6/5/2016)       Lower back pain (12/13/2020)       Hypoxia (12/13/2020)         ____________________________________________________________________  Melissa Sinclair is a 78 y.o. female presents with Acute respiratory failure with hypoxemia ([address] [J96.01].      As noted in H&P: \"PRESENT ILLNESS:     Nitza Luis is a 78 y.o.   female with past medical history significant for CHF, SHAYLA on CPAP, and spinal stenosis who presents with worsening of lower back pain over the last 3 weeks.  Patient has chronic lower back pain that is on and off for years.  She follows up with an orthopedic doctor and receives steroid injections almost every 2 months. Karen Hill last dose was in October.  Patient gets some relief with the steroid injection but the pain often recurs.  Over the last 3 weeks, her lower back pain has gotten worse to the extent she was unable to walk or get out of bed.  The pain has been persistent, 10/10, and occasionally radiates to the right leg.  Worse with any kind of movement, no relieving factor.  She has tried different analgesics and muscle relaxants with no significant improvement.  She denies subjective fever, bowel/bladder incontinence, or lower extremity weakness.  Patient's  called EMS for worsening back pain.  Per EMS patient was febrile with a temp of 100.7.  No record of fever in the ED.  In the ED patient was noted to have a sat of 82% on room air.  Patient states she has chronic shortness of breath and gets winded even with walking short distance.  She follows up with her pulmonologist and she was recently prescribed a pulse oximeter to monitor her oxygen but she has not gotten it yet.  She denies recent worsening of shortness of breath, cough, chest pain, myalgia, or fatigue.  She does have contact history with her son-in-law who has COVID infection.     Of note, patient also has left lower leg erythema and ulcer which started as a blister about a week ago. She was prescribed topical ointment/?antibiotics but hasn't seen any improvement. \"     ______________________________________________________________________     The patient reports no chest pain/pressure; no change in chronic dyspnea; no change in LE edema: plan for compression Rx as noted below.     No PND, orthopnea, palpitations, pre-syncope, syncope, peripheral edema, or decrease in exercise tolerance. No current complaints.     ECG: Afib; Vpacint. Tele: none  CXR: \"No acute findings. \" NO CHF. Notable labs: Neg COVID; Hg 9.1; Cr 1.38 from 1.57 from 1.4-1.5 range. Nl troponin x1; proBNP 1.8k  Nl TSH & LDL 53 8/2020.   _______________________________________________________________  Notable prior cardiac history:  @ OV 10/10/20:  Ms Birdie Crowley has a h/o:  1) CAD (Prox LAD ISELA 4/2014 for NQWMI), Neg PET for ischemia 9/2018. Min CAD 2/2019.  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 Clary Concepcion was too expensive. *EF 15-20% 9/2018. EF 30% w/ minimal CAD at cath 2/2019. EF 20-25% (m-mod MR; mod TR) 8/2019. *EF 35-40% 8/2020 (admit for CHF: too much salt). 3) HTN,   4) AFIB: PAfib (RFA 4/2016 and 1/2017), Recurrent/persistent afib 2017/2018: AV node ablation 10/2018. *Chronic AC w/ pradaxa. 5) DM,   6) Dyslipidemia   7) CKD III: Aldactone stopped in 2014. Cr 2 11/2018 (after becky): Cr 1.5 12/2018 & 8/2019; Cr 1.4/gfr 39 12/2020 (Dr. Jennifer Oliveira). 8) St Don PPM 7/2014 for bradycardia while on therapy for AFib: changed to BiV ICD 10/2018.  9) nephrolithiasis  10) SHAYLA (on CPAP),   11) cholecystectomy 11/2018.  12) LE venous insufficiency:  B SFV ablation 3/2020.   Continues to use compression stockings/wraps. Obesity: 240# 2014; 262# 9/2019. 270 to 281# 2020. 279# 8/2020; 281# 12/2020.     7/2020:  No chest pain nor change in dyspnea. Using CPAP. Had palpitations x1 since last visit. No falling or bleeding.     8/2020:  [address]  last week for CHF; too much sodium. Since discharge, no chest pain nor palpitations. No change in dyspnea; less LE edema overall, (never fully resolves). Occasional lightheaded if she gets up too quickly. Complains of continued fatigue but does not wish home PT.     12/2020:  No chest pain; decreasing dyspnea. More prominent LE edema/erythema; sees Dr. Jason Adler later this week.     Patient presents today for 6mo follow up. Continues to have swelling and redness. Reported that she has been doing leg exercises. Patient could not but the compression stockings due to constant fluid leak from left lower extremity. Patient has tried conservative management with compression stocking, exercises and leg elevation for last 6 months. Subsequently patient underwent bilateral GSV ablation. She did not find much improvement in her symptoms. Now patient is having hyperkeratosis of the skin on anterior aspect of the leg, oozing of fluid, she has stage III leg edema     IMPRESSION AND PLAN  01. Lymphedema, not elsewhere classified (I89.0):  Patient has been having chronic edema bilateral lower extremity. She has been following the conservative management with compression stocking, exercises and leg elevation for last 6 months. Subsequently she underwent bilateral GSV ablation. But did not help much with her swelling. Now she is having hyperkeratosis of the skin in the anterior aspect of the legs, chronic wounds, constant oozing of fluid. Part of her leg edema is because of lymphedema. At this point I think lymphedema compression pumps will help with her swelling. 02. Chronic venous insufficiency (I87.2):   She has bilateral lower extremity edema also has signs of chronic venous stasis with discoloration of anterior aspect of legs. She also has congestive heart failure and chronic kidney disease. Her etiology for leg swelling is multifactorial.  She has undergone bilateral GSV RF ablation. She has not seen much improvement yet. I could part of it is because of her lymphedema. Will prescribe her lymphedema compression pumps. 03. Atherosclerotic heart disease of native coronary artery without angina pectoris (I25.10): Minimal CAD @ cath 2/2019.     We discussed the signs and symptoms of unstable angina, myocardial infarction and malignant arrhythmia. The patient knows to seek immediate medical attention should they occur. 04. Dilated cardiomyopathy (I42.0):  Mixed ischemic/non-ischemic (tachycardia mediated) CM previously. Her EF improved but then has worsened again as noted above. EF 30% 2/2019. The patient has an ICD in place. 05. Chronic combined systolic (congestive) and diastolic (congestive) heart failure (I50.42): The patient is compliant with their CHF regimen. is tolerating the current beta-blocker dose. 06. Longstanding persistent atrial fibrillation (I48.11): As per Dr. Zhao Fisher, off amiodarone & now s/p AV node ablation. She remains on anticoagulation. 07. Hyp hrt & chr kdny dis w hrt fail and stg 1-4/unsp chr kdny (I13.0):  Finally controlled with addition of Imdur. 08. Mixed hyperlipidemia (E78.2):  Lipid labs drawn by PCP. The patient is tolerating lipid lowering therapy well. 09. Chronic kidney disease, stage 3 (moderate) (N18.3):  Cr 1.24/GFR46 11/2015. Cr 1.32/GFR 43 1/2017. Cr 2 11/2018 after becky. Creatinine 1.5 12/2018. 10. Localized edema (R60.0):  Chronic. She avoids salt. Will evaluate for venous insufficiency. 11. Presence of automatic (implantable) cardiac defibrillator (Z95.810): This is a biventricular device. will continue to be followed in device clinic.    12. Type 2 diabetes mellitus with other specified complication (E11.69):  Lipids & HTN as noted above; DM managed by other MD.   13. Long term (current) use of aspirin (Z79.82): This condition is stable. 15. Long term (current) use of anticoagulants (Z79.01): This condition is stable. Indicated for atrial fibrillation. No bleeding. If she has recurrent falls, she knows to call for re-evaluation of anticoagulation.    15. Body mass index [BMI]40.0-44.9, adult (Z68.41)      ORDERS:  1 Dietary management education, guidance, and counseling     2 Return office visit with Amy Crawford MD in 6 Months.           12/10/10 MEDICATION LIST  Medication Sig Desc Non-VCS   acetaminophen 500 mg capsule take 2 capsule by oral route  every 6 hours as needed Y   albuterol sulfate HFA 90 mcg/actuation aerosol inhaler inhale 2 puff by inhalation route  every 4 - 6 hours as needed N   amlodipine 10 mg tablet take 1 tablet by oral route  every day N   aspirin 81 mg tablet,delayed release take 1 tablet by oral route  every day N   ATORVASTATIN CALCIUM 40 MG Tablet TAKE 1 TABLET EVERY EVENING N   Ayr Saline 0.65 % nasal spray aerosol   Y   bumetanide 2 mg tablet take 1 tablet by oral route  every day N   Claritin 10 mg tablet take 1 tablet by oral route  every day Y   clonazepam 0.5 mg tablet take 1 tablet by oral route  every day Y   clotrimazole-betamethasone 1 %-0.05 % topical cream apply by topical route 2 times every day for 2 weeks to the affected and surrounding areas of skin in the morning and evening Y   cyclobenzaprine 10 mg tablet take 1 tablet by oral route 2 times every day Y   glimepiride 2 mg tablet take 1 tablet by oral route  every day Y   hydralazine 25 mg tablet take 1 tablet by oral route 2 times every day with food N   isosorbide mononitrate ER 30 mg tablet,extended release 24 hr take 1 tablet by oral route  twice a day N   lidocaine 4 % topical cream   Y   lisinopril 20 mg tablet take 1 tablet by oral route  every day Y   Lyrica 200 mg capsule take 1 capsule by oral route 2 times every day Y   magnesium 400 mg (as magnesium oxide) tablet take 1 tablet by oral route  every day Y   metoprolol succinate  mg tablet,extended release 24 hr TAKE 1 TABLET EVERY DAY N   nystatin 100,000 unit/gram topical cream apply by topical route 2 times every day to the affected area(s) as needed Y   omeprazole 40 mg capsule,delayed release take 1 capsule by oral route  every day before a meal Y   potassium chloride ER 10 mEq capsule,extended release take 1 Capsule by oral route 3 times daily N   Pradaxa 150 mg capsule take 1 capsule by oral route 2 times every day N   Premarin 0.625 mg/gram vaginal cream insert 0.5 applicatorful by vaginal route  every day cyclically, 3 weeks on and 1 week off Y   Symbicort 160 mcg-4.5 mcg/actuation HFA aerosol inhaler inhale 2 puff by inhalation route 2 times every day in the morning and evening N   Ultram 50 mg tablet take 1 tablet by oral route  every 6 hours as needed Y   venlafaxine  mg capsule,extended release 24 hr take 1 capsule by oral route 2 times every day N   Vitamin D3 1,000 unit capsule take 1 daily N         CARDIAC HISTORY  CAD:  1 NSTEMI [95% Proximal LAD, Resolute (ISELA) to the Proximal LAD.] - 4/8/2014      CHF/CM:  1 Mixed ischemic/tachycardic CM. [Nl TSH.] - 4/2014   2 Ischemic & tachycardic Cardiomyopathy [Echo (EF 0.45) - 06/17/2014, (prev EF 20-30%)] - 6/2014   3 Progressive Cardiomyopathy [Cardiac PET (EF .16) - 09/24/2018, No ischemia noted. EF improved but not normalized; no CAD cath 2/2019.] - 9/2018      ARRHYTHMIA:  1 A Fib [DCCV, Plan: amio started; Eliquis with Effient (change eliquis to asa if NSR after 30 days). ] - 4/8/2014   2 PAF - 8/2010   3 A Fib, recurrent; and 6/2014. [Had marked sinus bradycardia while on bblocker/dig.] - 5/2014   4 Sinus Bradycardia. - 6/2/2014   5 PPM (Devices (Dual Chamber PPM (Jenness Gauze)) - 7/17/2014) - 7/17/2014   6 PAF [CVR; Eliquis restarted (plavix stopped). ] - 9/2015   7 A Fib [RFA] - 4/2016   8 A Fib [RFA] - 1/2017   9 Chronic A Fib [PPM to BiV device, then AV node ablation. Amiodarone stopped. ] - 10/2018      RISK FACTORS:  1 Diabetes [Diagnosed in 2014]   2 Hypertension   3 Dyslipidemia   4 Tobacco Use: No/never         CARDIOVASCULAR PROCEDURES  Procedure Date Results   Cardiac PET 09/24/2018 EF 0.16 (16%), physiologic apical thinning with no ischemia   Cath 02/22/2019 Minimal CAD   Cath 04/08/2014 95% Proximal LAD, Resolute (ISELA) to the Proximal LAD. DCCV 04/08/2014 Initial Rhythm A Fib, Final Rhythm Sinus   Devices 02/08/2019 Bi-Ventricular ICD (St. Don), 100% Afib. BIV pacing >99% (prior AV node ablation)   Devices 07/17/2014 Dual Chamber PPM (St. [id number])   Echo 08/11/2020 LVE; EF 35-40%; normal RV.  minimal AS (mean 9). PAp 43; at [facility]. Echo 08/17/2019 EF 20-25%; mild-to-moderate MR. Moderate TR. Low normal RV function. At [facility]. Echo 02/06/2019 EF 0.30 (30%), Mild MR, Mild TR, Mild LVH, Aortic Sclerosis, LA Diam 3.9 cm, Est RVSP 40 mmHg, global hypokinesis, worse in the inferior wall & apex. Normal RV. Echo 10/08/2018 EF 0.15 (15%), Aortic Sclerosis, Mild MR, Est RVSP 23 mmHg, EF 15-20%; normal RV. At [facility]. Echo 11/28/2017 EF 0.45 (45%), Mild Global Hypo, Mild TR, Mild LVH, Mild MR, Est RVSP 41 mmHg, Normal RV. (probable trileaflet AoV). Echo 10/30/2015 EF 0.45 (45%), Mild LVH, LA Diam 4.1 cm, Est RVSP 35 mmHg, Normal RV. ?bicuspid AoV; (Prob trileaflet on previous ANGELITO). Echo 06/17/2014 EF 0.45 (45%), EF range 45%-50%, Mild LV dilatation. Mild LV systolic dysfunction. ? Bicuspid AoV but prob trileafet on previous ANGELITO. Echo 04/03/2014 EF 0.20 (20%), global HK with lateral sparing; no valve disease.    EKG 12/07/2020 Atrial Fib, Paced Rhythm   Holter 06/09/2014 No Malignant Arrhythmias, Atrial Fib, No correlation with symptoms, (, [address]   Holter 04/30/2014 No Malignant Arrhythmias, Atrial Fib, No correlation with symptoms, \"light or heavy chest\" = afib in 60s. HR  (ave 63). ASx pauses (upto 2.8) while asleep. RFA 01/19/2017 Indication A Fib, Final Rhythm Sinus   RFA   Indication A Fib   Sleep Study   OSAS: Moderate   ANGELITO 04/08/2014 EF 0.35 (35%), No BRAYDON Clot, prob trileaflet AoV. Venous Duplex 04/16/2020 Left:  1. Post venous procedure duplex exam shows no evidence of thrombus in the saphenofemoral junction. 2. Left mid calf positive  5mm in diameter and 2.8s in reflux.     Right:  Right distal calf positive  4.1mm in diameter and .53s in reflux. Venous Duplex 03/27/2020 Post venous ablation duplex exam shows no evidence of heat induced thrombus in the right common femoral vein. Venous Duplex 02/19/2020 Exam was conducted with patient in a high reverse-trendelenburg position. The (right/left/bilateral) great saphenous vein is incompetent (above/below the knee) (throughout the lower extremity) with retrograde flow >0.5seconds.        For other plans, see orders.   Hospital problem list     Active Hospital Problems    Diagnosis Date Noted    Obesity (BMI 30-39.9) 04/30/2019     Priority: 1 - One    Ischemic cardiomyopathy 11/13/2018     Priority: 1 - One    Stage 3 chronic kidney disease 11/13/2018     Priority: 1 - One    Hypercholesterolemia 01/22/2018     Priority: 1 - One    Lower back pain 12/13/2020    Hypoxia 94/03/9296    Systolic CHF, chronic ([address] 06/05/2016    Chronic atrial fibrillation ([address] 06/05/2016    Type 2 diabetes mellitus with diabetic neuropathy, without long-term current use of insulin ([address] 15/77/4380    Acute systolic heart failure ([address] 04/04/2014        Subjective: Beatris Brunner reports       Chest pain X none  consistent with:  Non-cardiac CP         Atypical CP     None now  On going  Anginal CP     Dyspnea X none  at rest  with exertion         improved  unchanged  worse              PND X none  overnight       Orthopnea X none  improved  unchanged  worse   Presyncope X none  improved unchanged  worse     Ambulated in hallway without symptoms   Yes   Ambulated in room without symptoms  Yes   Objective:     Physical Exam:  Overall VSSAF;    Visit Vitals  BP (!) 173/77 (BP 1 Location: Left arm, BP Patient Position: At rest)   Pulse 80   Temp 98.1 °F (36.7 °C)   Resp 17   Ht 5' 9\" (1.753 m)   Wt 128.4 kg (283 lb)   SpO2 94%   BMI 41.79 kg/m²     Temp (24hrs), Av °F (37.2 °C), Min:98.1 °F (36.7 °C), Max:100.5 °F (38.1 °C)    Patient Vitals for the past 8 hrs:   Pulse   12/15/20 0826 80   12/15/20 0434 80     Patient Vitals for the past 8 hrs:   Resp   12/15/20 08 17     Patient Vitals for the past 8 hrs:   BP   12/15/20 0826 (!) 173/77       Intake/Output Summary (Last 24 hours) at 12/15/2020 1057  Last data filed at 12/15/2020 0655  Gross per 24 hour   Intake 600 ml   Output 900 ml   Net -300 ml     General Appearance: Well developed, obese, no acute distress. Ears/Nose/Mouth/Throat:   Normal MM; anicteric. JVP: WNL   Resp:   Lungs clear to auscultation bilaterally. Nl resp effort. Cardiovascular:  IRRR, S1, S2 normal, no new murmur. No gallop or rub. Abdomen:   Soft, non-tender, bowel sounds are present. Extremities: Mild edema bilaterally. Chronic stasis. Skin:  Neuro: Warm and dry. A/O x3, grossly nonfocal       cath site intact w/o hematoma or new bruit; distal pulse unchanged  Yes   Data Review:     Telemetry none  sinus  chronic afib  parox afib  NSVT     ECG independently reviewed  NSR  afib  no significant changes  NSST-Tw chgs     x no new ECG provided for review   Lab results reviewed as noted below. Current medications reviewed as noted below. No results for input(s): PH, PCO2, PO2 in the last 72 hours.   Recent Labs     20  1309   TROIQ <0.05     Recent Labs     12/15/20  0431 20  0416 20  1309    145 143   K 2.9* 3.6 3.5   * 112* 110*   CO2 32 30 31   BUN 17 16 23*   CREA 1.54* 1.38* 1.57*   GFRAA 41* 46* 40*   * 99 103*   CA 8.9 8.9 9.0   ALB  --   --  3.1*   WBC 5.3 6.4 7.9   HGB 8.9* 9.1* 10.1*   HCT 31.0* 32.2* 35.1    159 178     Lab Results   Component Value Date/Time    Cholesterol, total 123 08/10/2020 03:55 AM    HDL Cholesterol 56 08/10/2020 03:55 AM    LDL, calculated 53 08/10/2020 03:55 AM    Triglyceride 70 08/10/2020 03:55 AM    CHOL/HDL Ratio 2.2 08/10/2020 03:55 AM     Recent Labs     12/13/20  1309   *   TP 6.8   ALB 3.1*   GLOB 3.7     No results for input(s): INR, PTP, APTT, INREXT in the last 72 hours.    No components found for: GLPOC    Current Facility-Administered Medications   Medication Dose Route Frequency    potassium chloride 10 mEq in 100 ml IVPB  10 mEq IntraVENous Q1H    albuterol (PROVENTIL HFA, VENTOLIN HFA, PROAIR HFA) inhaler 2 Puff  2 Puff Inhalation Q6H RT    fluticasone-vilanterol (BREO ELLIPTA) 200mcg-25mcg/puff  1 Puff Inhalation DAILY    bumetanide (BUMEX) injection 2 mg  2 mg IntraVENous DAILY    acetaminophen (TYLENOL) tablet 1,000 mg  1,000 mg Oral Q6H PRN    aspirin chewable tablet 81 mg  81 mg Oral DAILY    atorvastatin (LIPITOR) tablet 40 mg  40 mg Oral QHS    calcium carbonate (OS-YEFRI) tablet 500 mg [elemental]  500 mg Oral DAILY    cholecalciferol (VITAMIN D3) (400 Units /10 mcg) tablet 1 Tab  400 Units Oral DAILY    clonazePAM (KlonoPIN) tablet 0.5 mg  0.5 mg Oral QHS    cyclobenzaprine (FLEXERIL) tablet 10 mg  10 mg Oral TID PRN    dabigatran etexilate (PRADAXA) capsule 150 mg  150 mg Oral BID    diphenhydrAMINE (BENADRYL) capsule 25 mg  25 mg Oral BID PRN    hydrALAZINE (APRESOLINE) tablet 25 mg  25 mg Oral BID    isosorbide mononitrate ER (IMDUR) tablet 60 mg  60 mg Oral DAILY    lisinopriL (PRINIVIL, ZESTRIL) tablet 20 mg  20 mg Oral DAILY    metoprolol succinate (TOPROL-XL) XL tablet 100 mg  100 mg Oral DAILY    pantoprazole (PROTONIX) tablet 40 mg  40 mg Oral ACB    potassium chloride SR (KLOR-CON 10) tablet 10 mEq  10 mEq Oral TID    pregabalin (LYRICA) capsule 300 mg  300 mg Oral BID    traMADoL (ULTRAM) tablet 50 mg  50 mg Oral DAILY PRN    venlafaxine-SR (EFFEXOR-XR) capsule 150 mg  150 mg Oral DAILY WITH BREAKFAST    sodium chloride (NS) flush 5-40 mL  5-40 mL IntraVENous Q8H    sodium chloride (NS) flush 5-40 mL  5-40 mL IntraVENous PRN    acetaminophen (TYLENOL) tablet 650 mg  650 mg Oral Q6H PRN    Or    acetaminophen (TYLENOL) suppository 650 mg  650 mg Rectal Q6H PRN    polyethylene glycol (MIRALAX) packet 17 g  17 g Oral DAILY PRN    promethazine (PHENERGAN) tablet 12.5 mg  12.5 mg Oral Q6H PRN    Or    ondansetron (ZOFRAN) injection 4 mg  4 mg IntraVENous Q6H PRN    insulin lispro (HUMALOG) injection   SubCUTAneous AC&HS    glucose chewable tablet 16 g  4 Tab Oral PRN    dextrose (D50W) injection syrg 12.5-25 g  12.5-25 g IntraVENous PRN    glucagon (GLUCAGEN) injection 1 mg  1 mg IntraMUSCular PRN    cephALEXin (KEFLEX) capsule 500 mg  500 mg Oral QID        Brandie Hanks MD

## 2020-12-15 NOTE — PROGRESS NOTES
Barbara called for patient CPAP QC. [facility] number [id number]    [id number]  Polo Bacon from [facility] returned call and request for nurse to turn on CPAP and read him the CPAP serial number which he will then cancel the ticket. QC complete.

## 2020-12-15 NOTE — PROGRESS NOTES
Problem: Falls - Risk of  Goal: *Absence of Falls  Description: Document Eula Andrew Fall Risk and appropriate interventions in the flowsheet. Outcome: Progressing Towards Goal  Note: Fall Risk Interventions:            Medication Interventions: Bed/chair exit alarm, Evaluate medications/consider consulting pharmacy, Patient to call before getting OOB, Teach patient to arise slowly    Elimination Interventions: Bed/chair exit alarm, Call light in reach, Elevated toilet seat, Patient to call for help with toileting needs, Stay With Me (per policy), Toilet paper/wipes in reach, Toileting schedule/hourly rounds              Problem: Patient Education: Go to Patient Education Activity  Goal: Patient/Family Education  Outcome: Progressing Towards Goal     Problem: Patient Education: Go to Patient Education Activity  Goal: Patient/Family Education  Outcome: Progressing Towards Goal     Problem: Pressure Injury - Risk of  Goal: *Prevention of pressure injury  Description: Document Preet Scale and appropriate interventions in the flowsheet.   Note: Pressure Injury Interventions:  Sensory Interventions: Assess need for specialty bed    Moisture Interventions: Absorbent underpads    Activity Interventions: Increase time out of bed    Mobility Interventions: HOB 30 degrees or less    Nutrition Interventions: Document food/fluid/supplement intake    Friction and Shear Interventions: HOB 30 degrees or less

## 2020-12-15 NOTE — PROGRESS NOTES
End of Shift Note    Bedside shift change report given to [facility] (oncoming nurse) by John Javier RN (offgoing nurse). Report included the following information SBAR, Kardex, ED Summary, Intake/Output, MAR and Recent Results    Shift worked:  7p-7a     Shift summary and any significant changes:     Patient medicate with flexaril and Ultram for lower back pain. Patient  K=2.9     Concerns for physician to address: Same as above     Zone phone for oncoming shift:  none       Activity:  Activity Level: Up with Assistance  Number times ambulated in hallways past shift: 0  Number of times OOB to chair past shift: 0    Cardiac:   Cardiac Monitoring: No      Cardiac Rhythm: Paced    Access:   Current line(s): PIV     Genitourinary:   Urinary status: incontinent  Respiratory:   O2 Device: Nasal cannula  Chronic home O2 use?: 3.5  Incentive spirometer at bedside: NO     GI:  Last Bowel Movement Date: 12/11/20  Current diet:  DIET DIABETIC CONSISTENT CARB Regular; FR 1000ML  Passing flatus: YES  Tolerating current diet: YES       Pain Management:   Patient states pain is manageable on current regimen: YES    Skin:  Preet Score: 16  Interventions: turn team, float heels and increase time out of bed    Patient Safety:  Fall Score:  Total Score: 2  Interventions: bed/chair alarm, assistive device (walker, cane, etc) and pt to call before getting OOB       Length of Stay:  Expected LOS: - - -  Actual LOS: 2      Afua Fiore RN

## 2020-12-15 NOTE — PROGRESS NOTES
[employer] called for patient CPAP from home. #      [employer] rep called and request for nurse to plug CPAP, turn it on to see if it works and relay the CPAP serial # to the Rep. Rep stated he will close the ticket at his end.

## 2020-12-15 NOTE — PROGRESS NOTES
Received message from patient's nurse  Afua Lyles stating :    patient Potassium level =2.9. Discussion / orders:    Potassium chloride 10 mEq IV x6  Recheck BMP at 1300         Please note that this note was dictated using Dragon computer voice recognition software. Quite often unanticipated grammatical, syntax, homophones, and other interpretive errors are inadvertently transcribed by the computer software. Please disregard these errors. Please excuse any errors that have escaped final proofreading.

## 2020-12-15 NOTE — PROGRESS NOTES
Problem: Falls - Risk of  Goal: *Absence of Falls  Description: Document Rochele Hawa Fall Risk and appropriate interventions in the flowsheet. Outcome: Progressing Towards Goal  Note: Fall Risk Interventions:            Medication Interventions: Bed/chair exit alarm, Evaluate medications/consider consulting pharmacy, Patient to call before getting OOB, Teach patient to arise slowly    Elimination Interventions: Bed/chair exit alarm, Call light in reach, Elevated toilet seat, Patient to call for help with toileting needs, Stay With Me (per policy), Toilet paper/wipes in reach, Toileting schedule/hourly rounds              Problem: Patient Education: Go to Patient Education Activity  Goal: Patient/Family Education  Outcome: Progressing Towards Goal     Problem: Pressure Injury - Risk of  Goal: *Prevention of pressure injury  Description: Document Rpeet Scale and appropriate interventions in the flowsheet. Outcome: Progressing Towards Goal  Note: Pressure Injury Interventions:  Sensory Interventions: (P) Assess need for specialty bed    Moisture Interventions: (P) Absorbent underpads    Activity Interventions: (P) Chair cushion    Mobility Interventions: (P) Float heels    Nutrition Interventions: (P) Document food/fluid/supplement intake    Friction and Shear Interventions: Apply protective barrier, creams and emollients, Feet elevated on foot rest, HOB 30 degrees or less, Lift sheet, Minimize layers                Problem: Patient Education: Go to Patient Education Activity  Goal: Patient/Family Education  Outcome: Progressing Towards Goal     Problem: Risk for Spread of Infection  Goal: Prevent transmission of infectious organism to others  Description: Prevent the transmission of infectious organisms to other patients, staff members, and visitors.   Outcome: Progressing Towards Goal     Problem: Patient Education:  Go to Education Activity  Goal: Patient/Family Education  Outcome: Progressing Towards Goal Problem: Breathing Pattern - Ineffective  Goal: *Absence of hypoxia  Outcome: Progressing Towards Goal  Goal: *Use of effective breathing techniques  Outcome: Progressing Towards Goal

## 2020-12-16 LAB
ANION GAP SERPL CALC-SCNC: 4 MMOL/L (ref 5–15)
BUN SERPL-MCNC: 16 MG/DL (ref 6–20)
BUN/CREAT SERPL: 11 (ref 12–20)
CALCIUM SERPL-MCNC: 8.9 MG/DL (ref 8.5–10.1)
CHLORIDE SERPL-SCNC: 109 MMOL/L (ref 97–108)
CO2 SERPL-SCNC: 29 MMOL/L (ref 21–32)
CREAT SERPL-MCNC: 1.4 MG/DL (ref 0.55–1.02)
GLUCOSE BLD STRIP.AUTO-MCNC: 118 MG/DL (ref 65–100)
GLUCOSE BLD STRIP.AUTO-MCNC: 121 MG/DL (ref 65–100)
GLUCOSE BLD STRIP.AUTO-MCNC: 144 MG/DL (ref 65–100)
GLUCOSE BLD STRIP.AUTO-MCNC: 161 MG/DL (ref 65–100)
GLUCOSE SERPL-MCNC: 114 MG/DL (ref 65–100)
MAGNESIUM SERPL-MCNC: 2 MG/DL (ref 1.6–2.4)
POTASSIUM SERPL-SCNC: 3.4 MMOL/L (ref 3.5–5.1)
SERVICE CMNT-IMP: ABNORMAL
SODIUM SERPL-SCNC: 142 MMOL/L (ref 136–145)

## 2020-12-16 PROCEDURE — 74011250637 HC RX REV CODE- 250/637: Performed by: STUDENT IN AN ORGANIZED HEALTH CARE EDUCATION/TRAINING PROGRAM

## 2020-12-16 PROCEDURE — 82962 GLUCOSE BLOOD TEST: CPT

## 2020-12-16 PROCEDURE — 74011250637 HC RX REV CODE- 250/637: Performed by: NURSE PRACTITIONER

## 2020-12-16 PROCEDURE — 94640 AIRWAY INHALATION TREATMENT: CPT

## 2020-12-16 PROCEDURE — 74011636637 HC RX REV CODE- 636/637: Performed by: STUDENT IN AN ORGANIZED HEALTH CARE EDUCATION/TRAINING PROGRAM

## 2020-12-16 PROCEDURE — 94760 N-INVAS EAR/PLS OXIMETRY 1: CPT

## 2020-12-16 PROCEDURE — 74011250636 HC RX REV CODE- 250/636: Performed by: NURSE PRACTITIONER

## 2020-12-16 PROCEDURE — 65270000029 HC RM PRIVATE

## 2020-12-16 PROCEDURE — 77010033678 HC OXYGEN DAILY

## 2020-12-16 PROCEDURE — 74011000250 HC RX REV CODE- 250: Performed by: NURSE PRACTITIONER

## 2020-12-16 PROCEDURE — 36415 COLL VENOUS BLD VENIPUNCTURE: CPT

## 2020-12-16 PROCEDURE — 83735 ASSAY OF MAGNESIUM: CPT

## 2020-12-16 PROCEDURE — 80048 BASIC METABOLIC PNL TOTAL CA: CPT

## 2020-12-16 RX ORDER — POTASSIUM CHLORIDE 20 MEQ/1
20 TABLET, EXTENDED RELEASE ORAL 3 TIMES DAILY
Status: DISCONTINUED | OUTPATIENT
Start: 2020-12-16 | End: 2020-12-24 | Stop reason: HOSPADM

## 2020-12-16 RX ORDER — CYANOCOBALAMIN (VITAMIN B-12) 1000 MCG
2 TABLET, EXTENDED RELEASE ORAL DAILY
COMMUNITY
End: 2021-03-08 | Stop reason: ALTCHOICE

## 2020-12-16 RX ORDER — ENOXAPARIN SODIUM 150 MG/ML
120 INJECTION SUBCUTANEOUS EVERY 12 HOURS
Status: DISCONTINUED | OUTPATIENT
Start: 2020-12-16 | End: 2020-12-20

## 2020-12-16 RX ORDER — PREGABALIN 150 MG/1
150 CAPSULE ORAL 2 TIMES DAILY
COMMUNITY
End: 2021-05-24

## 2020-12-16 RX ORDER — HYDROCODONE BITARTRATE AND ACETAMINOPHEN 5; 325 MG/1; MG/1
1 TABLET ORAL
Status: DISCONTINUED | OUTPATIENT
Start: 2020-12-16 | End: 2020-12-24 | Stop reason: HOSPADM

## 2020-12-16 RX ORDER — EUCALYPTUS/PEPPERMINT OIL
1-2 SOLUTION, NON-ORAL NASAL
Status: ON HOLD | COMMUNITY
End: 2021-10-06

## 2020-12-16 RX ADMIN — CYCLOBENZAPRINE 10 MG: 10 TABLET, FILM COATED ORAL at 09:22

## 2020-12-16 RX ADMIN — ALBUTEROL SULFATE: 90 AEROSOL, METERED RESPIRATORY (INHALATION) at 08:11

## 2020-12-16 RX ADMIN — HYDROCODONE BITARTRATE AND ACETAMINOPHEN 1 TABLET: 5; 325 TABLET ORAL at 22:11

## 2020-12-16 RX ADMIN — ISOSORBIDE MONONITRATE 60 MG: 30 TABLET, EXTENDED RELEASE ORAL at 09:24

## 2020-12-16 RX ADMIN — Medication 10 ML: at 09:26

## 2020-12-16 RX ADMIN — CEPHALEXIN 500 MG: 250 CAPSULE ORAL at 09:24

## 2020-12-16 RX ADMIN — ATORVASTATIN CALCIUM 40 MG: 40 TABLET, FILM COATED ORAL at 22:02

## 2020-12-16 RX ADMIN — CLONAZEPAM 0.5 MG: 0.5 TABLET ORAL at 22:03

## 2020-12-16 RX ADMIN — PANTOPRAZOLE SODIUM 40 MG: 40 TABLET, DELAYED RELEASE ORAL at 09:23

## 2020-12-16 RX ADMIN — METOPROLOL SUCCINATE 100 MG: 50 TABLET, EXTENDED RELEASE ORAL at 09:25

## 2020-12-16 RX ADMIN — Medication 10 ML: at 22:11

## 2020-12-16 RX ADMIN — HYDROCODONE BITARTRATE AND ACETAMINOPHEN 1 TABLET: 5; 325 TABLET ORAL at 15:32

## 2020-12-16 RX ADMIN — TRAMADOL HYDROCHLORIDE 50 MG: 50 TABLET, COATED ORAL at 13:36

## 2020-12-16 RX ADMIN — ENOXAPARIN SODIUM 120 MG: 120 INJECTION SUBCUTANEOUS at 22:03

## 2020-12-16 RX ADMIN — LISINOPRIL 20 MG: 20 TABLET ORAL at 09:24

## 2020-12-16 RX ADMIN — CEPHALEXIN 500 MG: 250 CAPSULE ORAL at 18:06

## 2020-12-16 RX ADMIN — INSULIN LISPRO 2 UNITS: 100 INJECTION, SOLUTION INTRAVENOUS; SUBCUTANEOUS at 09:25

## 2020-12-16 RX ADMIN — HYDRALAZINE HYDROCHLORIDE 25 MG: 50 TABLET, FILM COATED ORAL at 18:08

## 2020-12-16 RX ADMIN — ALBUTEROL SULFATE 2 PUFF: 90 AEROSOL, METERED RESPIRATORY (INHALATION) at 20:12

## 2020-12-16 RX ADMIN — ALBUTEROL SULFATE 2 PUFF: 90 AEROSOL, METERED RESPIRATORY (INHALATION) at 13:48

## 2020-12-16 RX ADMIN — FLUTICASONE FUROATE AND VILANTEROL TRIFENATATE 1 PUFF: 200; 25 POWDER RESPIRATORY (INHALATION) at 08:12

## 2020-12-16 RX ADMIN — ASPIRIN 81 MG CHEWABLE TABLET 81 MG: 81 TABLET CHEWABLE at 09:25

## 2020-12-16 RX ADMIN — INSULIN LISPRO 2 UNITS: 100 INJECTION, SOLUTION INTRAVENOUS; SUBCUTANEOUS at 13:35

## 2020-12-16 RX ADMIN — DABIGATRAN ETEXILATE MESYLATE 150 MG: 150 CAPSULE ORAL at 09:33

## 2020-12-16 RX ADMIN — VENLAFAXINE HYDROCHLORIDE 150 MG: 150 CAPSULE, EXTENDED RELEASE ORAL at 09:24

## 2020-12-16 RX ADMIN — PREGABALIN 300 MG: 150 CAPSULE ORAL at 09:23

## 2020-12-16 RX ADMIN — PREGABALIN 300 MG: 150 CAPSULE ORAL at 18:08

## 2020-12-16 RX ADMIN — CEPHALEXIN 500 MG: 250 CAPSULE ORAL at 22:02

## 2020-12-16 RX ADMIN — BUMETANIDE 2 MG: 0.25 INJECTION INTRAMUSCULAR; INTRAVENOUS at 09:34

## 2020-12-16 RX ADMIN — CEPHALEXIN 500 MG: 250 CAPSULE ORAL at 13:36

## 2020-12-16 RX ADMIN — Medication 1 TABLET: at 09:25

## 2020-12-16 RX ADMIN — HYDRALAZINE HYDROCHLORIDE 25 MG: 50 TABLET, FILM COATED ORAL at 09:22

## 2020-12-16 RX ADMIN — POTASSIUM CHLORIDE 10 MEQ: 750 TABLET, FILM COATED, EXTENDED RELEASE ORAL at 09:23

## 2020-12-16 RX ADMIN — CALCIUM 500 MG: 500 TABLET ORAL at 09:24

## 2020-12-16 RX ADMIN — POTASSIUM CHLORIDE 20 MEQ: 20 TABLET, EXTENDED RELEASE ORAL at 22:11

## 2020-12-16 NOTE — PROGRESS NOTES
Problem: Falls - Risk of  Goal: *Absence of Falls  Description: Document Jim Pedersen Fall Risk and appropriate interventions in the flowsheet. Outcome: Progressing Towards Goal  Note: Fall Risk Interventions:            Medication Interventions: Evaluate medications/consider consulting pharmacy, Bed/chair exit alarm    Elimination Interventions: Call light in reach, Bed/chair exit alarm              Problem: Pressure Injury - Risk of  Goal: *Prevention of pressure injury  Description: Document Preet Scale and appropriate interventions in the flowsheet. Outcome: Progressing Towards Goal  Note: Pressure Injury Interventions:  Sensory Interventions: Float heels, Keep linens dry and wrinkle-free    Moisture Interventions: Absorbent underpads, Apply protective barrier, creams and emollients    Activity Interventions: Pressure redistribution bed/mattress(bed type), Increase time out of bed    Mobility Interventions: Pressure redistribution bed/mattress (bed type), HOB 30 degrees or less    Nutrition Interventions: Document food/fluid/supplement intake    Friction and Shear Interventions: Apply protective barrier, creams and emollients, HOB 30 degrees or less                Problem: Risk for Spread of Infection  Goal: Prevent transmission of infectious organism to others  Description: Prevent the transmission of infectious organisms to other patients, staff members, and visitors.   Outcome: Progressing Towards Goal

## 2020-12-16 NOTE — PROGRESS NOTES
VISHNU Plan:  Home with spouse  Spouse will transport home at d/c  2nd IMM letter  CM to secure PCP follow up appointment prior to d/c  Will benefit from PT evaluation     Reason for Admission:  Acute respiratory failure with hypoxemia                    RUR Score: 25%                 PCP: First and Last name:  Radha Sandoval    Name of Practice:    Are you a current patient: Yes/No: yes    Approximate date of last visit: 3 weeks ago    Can you participate in a virtual visit if needed: no    Do you (patient/family) have any concerns for transition/discharge? Family is requesting PT evaluation prior to d/c to make sure pt is stable on her feet. Plan for utilizing home health:   Not at this time, pt states \"I don't need it. \"     Current Advanced Directive/Advance Care Plan:  ACP on file, spouse is Fatimah Merritt). Pt is FULL code            Transition of Care Plan:  Return home with spouse     Chart reviewed. CM met with pt and spouse at the bedside today to introduce self and role. Pt alert and oriented at time of assessment. Observed in bed and currently requiring 2L O2. Pt reports at baseline, pt does not use oxygen at home. She does use a CPAP which she has here at bedside. Pt resides with spouse, Bruna Carver, in a one level home with 2 steps to enter. Pt typically walks with a straight cane or a rollator. Pt is independent with ADLs at home to include bathing, dressing, and toileting. Pt identifies her spouse as her caregiver and can assist after d/c with any needs. Pt also owns a shower chair, raised toilet seat with rails, and grab bars. Pt has past hx of SNF stay at [facility] and [address] [place] [address]. She has used HHC in the past but cannot recall name of agency used at this time. Pt stated, \"I won't need that\" referring to [facility]. Spouse assists with medication management and finances. Preferred pharmacy is [facility] on [address]. Pt known to be active with PCP, Dr. Radha Sandoval.  ACP on file and spouse is mPOA. Pt is awaiting clarification on plan for her back pain. CM attended interdisciplinary rounds. Per report, if pt is able to lay flat and wean off of O2, she could be a candidate for a kyphoplasty. Patient will benefit from PT/OT evaluations prior to discharge. CM will continue to follow for d/c planning needs. Care Management Interventions  PCP Verified by CM: Yes(Dr. Dawna Luu )  Last Visit to PCP: 11/25/20  Palliative Care Criteria Met (RRAT>21 & CHF Dx)?: No  Mode of Transport at Discharge:  Other (see comment)(spouse )  Transition of Care Consult (CM Consult): Discharge Planning  Discharge Durable Medical Equipment: No  Physical Therapy Consult: No  Occupational Therapy Consult: No  Speech Therapy Consult: No  Current Support Network: Lives with Spouse, Own Home  Confirm Follow Up Transport: Family   Resource Information Provided?: No  Discharge Location  Discharge Placement: Home with family assistance    Juancho Maria, [address], [id number] [place] [facility]  [phone]

## 2020-12-16 NOTE — PROGRESS NOTES
End of Shift Note    Bedside shift change report given to [facility] (oncoming nurse) by Bahman Elliott (offgoing nurse). Report included the following information SBAR, Kardex and Intake/Output    Shift worked:  7a-7p     Shift summary and any significant changes:     none     Concerns for physician to address:  none     Zone phone for oncoming shift:          Activity:  Activity Level: Up with Assistance  Number times ambulated in hallways past shift: 0  Number of times OOB to chair past shift: 0    Cardiac:   Cardiac Monitoring: No      Cardiac Rhythm: Paced    Access:   Current line(s): PIV     Genitourinary:   Urinary status: incontinent    Respiratory:   O2 Device: Nasal cannula  Chronic home O2 use?: NO  Incentive spirometer at bedside: NO     GI:  Last Bowel Movement Date: 12/11/20  Current diet:  DIET DIABETIC CONSISTENT CARB Regular; FR 1000ML  Passing flatus: YES  Tolerating current diet: YES       Pain Management:   Patient states pain is manageable on current regimen: YES    Skin:  Preet Score: 16  Interventions: float heels and internal/external urinary devices    Patient Safety:  Fall Score:  Total Score: 2  Interventions: bed/chair alarm, gripper socks and pt to call before getting OOB       Length of Stay:  Expected LOS: - - -  Actual LOS: [facility]

## 2020-12-16 NOTE — PROGRESS NOTES
I reviewed pertinent labs and imaging, and discussed /agreed on the plan of care with Dr. Abel Benoit      Hospitalist Progress Note    NAME: Melany Forte   :  1950   MRN:  [id number]       Assessment / Plan:  Hypokalemia   - improved from 2.9 to 3.4   - increase Potassiu  - monitor in am     Worsening Back Pain with LE Weakness  Spinal Stenosis   · CT spine/lumbar  - \"Acute superior endplate fracture of L1 with Route 15% height loss. Multilevel degenerative changes with bilateral neural foraminal narrowing at the L4-5 and L5-S1 levels. \"  · Appreciate Neurosurgery input - not a surgical candidate, consider with IR kyphoplasty when respiratory status will allow for patient to lie flat  · Continue tramadol and cyclobenzaprine for pain added Norco   · Will hold Pradaxa in anticipation of kyphoplasty in next 48 hours place on full AC with Lovenox 120 mg Q 12  CHADS score 2      Acute on Chronic Respiratory Failure with Hypoxia related to Acute on Chronic Systolic and Diastolic Congestive Heart Failure O2 82%, Temp 100.6 on admission  · Rapid and PCR COVID results NEGATIVE  · CXR  - Without acute infiltrate or congestion   · CTA Chest - \"Small bilateral pleural effusions with overlying atelectasis. No pulmonary embolus or other acute cardiopulmonary process. \"    · ECHO 2020 - EF 35-40%. Dilated left ventricle. Mild concentric hypertrophy. Severe systolic function. Left ventricular diastolic function.   · Appreciate  Cardiology - no plans for any invasive cardiac work up ,not grossly overloaded   · O2 weaned sats at 94-95%  · Change bumex to IV   · Continue BB, imdur and lisinopril   · Strict I&Os  · Daily Weights  · 1L Fluid Restriction     Pulmonary Hypertension   Coronary Artery Disease s/p PCI   Ischemic Cardiomyopathy   S/p BiV ICD   Chronic Atrial Fibrillation - V paced currently   Hyperlipidemia   Hypertension   · Continue ASA, atorvastatin, pradaxa, hydralazine, imdur, metoprolol, lisinopril  · -160 today ? Pain related ? · Monitor BP -  Restart amlodipine if needed      Acute on Chronic LLE Cellulitis Temp 100.6 on admission  Chronic Venous Stasis of BLE   History of Chronic Diabetic Ulcer of Left Foot - healed   · Reports chronic LE cellulitis x 5 years  · LLE with erythema and venous stasis ulcer   · Continue Cephalexin for cellulitis   · Erythema improving   Appreciate Wound care      Chronic Kidney Disease Stage III Baseline 1.5 - resolved   · Cr at baseline, Cr 1.38 today   · Follow BMP   · Avoid Nephrotoxic Medications      Uncontrolled Type II Diabetes with Diabetic Neuropathy   Morbid Obesity BMI 42  · SSI   · Continue pregabalin   · Hold PTA glimepiride   · Last HgbA1c 8.4 (8/2020) - now at 6.0      Anemia of Chronic Disease Stable, monitor   Asthma Continue albuterol inhaler, breo-ellipta   GERD Continue protonix   Anxiety/Depression Continue venlafaxine-SR, clonazepam           / Body mass index is 41.79 kg/m². Code status: Full  Prophylaxis: Pradaxa  Recommended Disposition: Home w/Family     Subjective:     Chief Complaint / Reason for Physician Visit  \"I hurt I cant walk  \". Discussed with RN events overnight. Pt seen this morning with  at bedside , wants L ankle pain addressed      Review of Systems:  Symptom Y/N Comments  Symptom Y/N Comments   Fever/Chills n   Chest Pain n    Poor Appetite n   Edema n    Cough n   Abdominal Pain n    Sputum n   Joint Pain n    SOB/STEEN n   Pruritis/Rash n    Nausea/vomit    Tolerating PT/OT     Diarrhea n   Tolerating Diet n    Constipation    Other       Could NOT obtain due to:      Objective:     VITALS:   Last 24hrs VS reviewed since prior progress note.  Most recent are:  Patient Vitals for the past 24 hrs:   Temp Pulse Resp BP SpO2   12/16/20 1505 98.3 °F (36.8 °C) 80 18 133/70 94 %   12/16/20 1349     94 %   12/16/20 0812     94 %   12/16/20 0803 98.1 °F (36.7 °C) 80 18 (!) 165/87 93 %   12/16/20 0001 98.6 °F (37 °C) 80 18 (!) 153/80 93 %   12/15/20 2018 98.8 °F (37.1 °C) 85 17 (!) 158/75 94 %   12/15/20 2004     94 %       Intake/Output Summary (Last 24 hours) at 12/16/2020 1713  Last data filed at 12/15/2020 2018  Gross per 24 hour   Intake    Output 800 ml   Net -800 ml        I had a face to face encounter and independently examined this patient on 12/16/2020, as outlined below:  PHYSICAL EXAM:  General: Obese . Alert, cooperative, no acute distress    EENT:  EOMI. Anicteric sclerae. MMM  Resp:  Coarse BS  no wheezing or rales. No accessory muscle use  CV:  S1S2  Valentino Le 1+  edema  GI:  Soft, Non distended, Non tender. +Bowel sounds  Neurologic:  Alert and oriented X 3, normal speech,   Psych:   . Not anxious nor agitated  Skin:  No rashes. No jaundice venous stasis ulcers on L leg , erythema improving    Reviewed most current lab test results and cultures  YES  Reviewed most current radiology test results   YES  Review and summation of old records today    NO  Reviewed patient's current orders and MAR    YES  PMH/ reviewed - no change compared to H&P  ________________________________________________________________________  Care Plan discussed with:    Comments   Patient x    Family      RN x    Care Manager     Consultant                       x Multidiciplinary team rounds were held today with , nursing, pharmacist and clinical coordinator. Patient's plan of care was discussed; medications were reviewed and discharge planning was addressed. ________________________________________________________________________  Total NON critical care TIME:  30  Minutes    Total CRITICAL CARE TIME Spent:   Minutes non procedure based      Comments   >50% of visit spent in counseling and coordination of care     ________________________________________________________________________  Samy Zepeda.  Dorcas Burkitt, NP     Procedures: see electronic medical records for all procedures/Xrays and details which were not copied into this note but were reviewed prior to creation of Plan. LABS:  I reviewed today's most current labs and imaging studies. Pertinent labs include:  Recent Labs     12/15/20  0431 12/14/20  0416   WBC 5.3 6.4   HGB 8.9* 9.1*   HCT 31.0* 32.2*    159     Recent Labs     12/16/20  0453 12/15/20  0431 12/14/20  0416    143 145   K 3.4* 2.9* 3.6   * 109* 112*   CO2 29 32 30   * 108* 99   BUN 16 17 16   CREA 1.40* 1.54* 1.38*   CA 8.9 8.9 8.9   MG 2.0  --   --        Signed: Ana Ceja, NP

## 2020-12-16 NOTE — PROGRESS NOTES
Cardiology Progress Note  12/16/2020     Admit Date: 12/13/2020  Admit Diagnosis: Acute respiratory failure with hypoxemia (HCC) [J96.01]  CC: none currently  Cardiologist:  Dr Nayan Gordillo. Cardiac Assessment/Plan:    1) CAD (Prox LAD ISELA 4/2014 for NQWMI), Neg PET for ischemia 9/2018. Min CAD 2/2019.  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 Efrain How was too expensive. *EF 15-20% 9/2018. EF 30% w/ minimal CAD at cath 2/2019. EF 20-25% (m-mod MR; mod TR) 8/2019. *EF 35-40% 8/2020 (admit for CHF: too much salt). 3) HTN,   4) AFIB: PAfib (RFA 4/2016 and 1/2017), Recurrent/persistent afib 2017/2018: AV node ablation 10/2018. *Chronic AC w/ pradaxa. 5) DM,   6) Dyslipidemia   7) CKD III: Aldactone stopped in 2014. Cr 2 11/2018 (after becky): Cr 1.5 12/2018 & 8/2019; Cr 1.4/gfr 39 12/2020 (Dr. Neha Samayoa). 8) St Don PPM 7/2014 for bradycardia while on therapy for AFib: changed to BiV ICD 10/2018.  9) nephrolithiasis  10) SHAYLA (on CPAP),   11) cholecystectomy 11/2018.  12) LE venous insufficiency:  B SFV ablation 3/2020. Continues to use compression stockings/wraps. Obesity: 240# 2014; 262# 9/2019. 270 to 281# 2020. 279# 8/2020; 281# 12/2020.     Imp 12/14: stable cardiac status; not grossly overloaded;   Lymphedema compression pumps are pending as noted below. No plan for invasive cardiac testing; Dispo per primary team.      12/15: BPs 120-170; HR 80s; 94% 4L. -300 per I/O. Wt 287# to 283#. Hg 8.9; K 2.9; Cr 1.54. Cardiac meds; asa 81; lipitor 40; bumex 2 IV qday; pradaxa 150 bid; hydralazine 25 bid; Imdur 60; lisinopril 20; toprol ;     HypoK being Rxed by primary team; Cont IV bumex for now: PO soon. GI cocktail for epigastric/abd discomfort. Ortho/dispo per primary team.    12/16: No further abd discomfort. Laying supine; not OOB lately. BPs 140-170; hR 80s; 93% 2L. Wt pending; Inaccurate I/O (no Is recorded).   K 3.4; Cr 1.4  Cardiac meds; asa 81; lipitor 40; bumex 2 IV qday; pradaxa 150 bid; hydralazine 25 bid; Imdur 60; lisinopril 20; toprol ; (off norvasc). HypoK being Rxed by primary team; Cont IV bumex for now: PO soon. Ortho/dispo per primary team.  Increase hydralazine to 50 bid (may need tid). ____________________________________________________________________  Karen De Leon is a 78 y.o. female presents with Acute respiratory failure with hypoxemia ([address] [J96.01].      As noted in H&P: \"PRESENT ILLNESS:     Nitza Luis is a 78 y.o.   female with past medical history significant for CHF, SHAYLA on CPAP, and spinal stenosis who presents with worsening of lower back pain over the last 3 weeks.  Patient has chronic lower back pain that is on and off for years.  She follows up with an orthopedic doctor and receives steroid injections almost every 2 months. Shell Thomas last dose was in October.  Patient gets some relief with the steroid injection but the pain often recurs.  Over the last 3 weeks, her lower back pain has gotten worse to the extent she was unable to walk or get out of bed.  The pain has been persistent, 10/10, and occasionally radiates to the right leg.  Worse with any kind of movement, no relieving factor.  She has tried different analgesics and muscle relaxants with no significant improvement.  She denies subjective fever, bowel/bladder incontinence, or lower extremity weakness.  Patient's  called EMS for worsening back pain.  Per EMS patient was febrile with a temp of 100.7.  No record of fever in the ED.  In the ED patient was noted to have a sat of 82% on room air.  Patient states she has chronic shortness of breath and gets winded even with walking short distance.  She follows up with her pulmonologist and she was recently prescribed a pulse oximeter to monitor her oxygen but she has not gotten it yet.  She denies recent worsening of shortness of breath, cough, chest pain, myalgia, or fatigue. Zeke Wadsworth does have contact history with her son-in-law who has COVID infection.     Of note, patient also has left lower leg erythema and ulcer which started as a blister about a week ago. She was prescribed topical ointment/?antibiotics but hasn't seen any improvement. \"     ______________________________________________________________________     The patient reports no chest pain/pressure; no change in chronic dyspnea; no change in LE edema: plan for compression Rx as noted below.     No PND, orthopnea, palpitations, pre-syncope, syncope, peripheral edema, or decrease in exercise tolerance. No current complaints.     ECG: Afib; Vpacint. Tele: none  CXR: \"No acute findings. \" NO CHF. Notable labs: Neg COVID; Hg 9.1; Cr 1.38 from 1.57 from 1.4-1.5 range. Nl troponin x1; proBNP 1.8k  Nl TSH & LDL 53 8/2020.   _______________________________________________________________  Notable prior cardiac history:  @ OV 10/10/20:  Ms Jonas Diane has a h/o:  1) CAD (Prox LAD ISELA 4/2014 for NQWMI), Neg PET for ischemia 9/2018. Min CAD 2/2019.  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 Kathlyne Erp was too expensive. *EF 15-20% 9/2018. EF 30% w/ minimal CAD at cath 2/2019. EF 20-25% (m-mod MR; mod TR) 8/2019. *EF 35-40% 8/2020 (admit for CHF: too much salt). 3) HTN,   4) AFIB: PAfib (RFA 4/2016 and 1/2017), Recurrent/persistent afib 2017/2018: AV node ablation 10/2018. *Chronic AC w/ pradaxa. 5) DM,   6) Dyslipidemia   7) CKD III: Aldactone stopped in 2014. Cr 2 11/2018 (after becky): Cr 1.5 12/2018 & 8/2019; Cr 1.4/gfr 39 12/2020 (Dr. Urban Lefort). 8) St Don PPM 7/2014 for bradycardia while on therapy for AFib: changed to BiV ICD 10/2018.  9) nephrolithiasis  10) SHAYLA (on CPAP),   11) cholecystectomy 11/2018.  12) LE venous insufficiency:  B SFV ablation 3/2020. Continues to use compression stockings/wraps. Obesity: 240# 2014; 262# 9/2019. 270 to 281# 2020. 279# 8/2020; 281# 12/2020.     7/2020:   No chest pain nor change in dyspnea. Using CPAP. Had palpitations x1 since last visit. No falling or bleeding.     8/2020:  Lety Trinidad last week for CHF; too much sodium. Since discharge, no chest pain nor palpitations. No change in dyspnea; less LE edema overall, (never fully resolves). Occasional lightheaded if she gets up too quickly. Complains of continued fatigue but does not wish home PT.     12/2020:  No chest pain; decreasing dyspnea. More prominent LE edema/erythema; sees Dr. Chip Madera later this week.     Patient presents today for 6mo follow up. Continues to have swelling and redness. Reported that she has been doing leg exercises. Patient could not but the compression stockings due to constant fluid leak from left lower extremity. Patient has tried conservative management with compression stocking, exercises and leg elevation for last 6 months. Subsequently patient underwent bilateral GSV ablation. She did not find much improvement in her symptoms. Now patient is having hyperkeratosis of the skin on anterior aspect of the leg, oozing of fluid, she has stage III leg edema     IMPRESSION AND PLAN  01. Lymphedema, not elsewhere classified (I89.0):  Patient has been having chronic edema bilateral lower extremity. She has been following the conservative management with compression stocking, exercises and leg elevation for last 6 months. Subsequently she underwent bilateral GSV ablation. But did not help much with her swelling. Now she is having hyperkeratosis of the skin in the anterior aspect of the legs, chronic wounds, constant oozing of fluid. Part of her leg edema is because of lymphedema. At this point I think lymphedema compression pumps will help with her swelling. 02. Chronic venous insufficiency (I87.2): She has bilateral lower extremity edema also has signs of chronic venous stasis with discoloration of anterior aspect of legs.   She also has congestive heart failure and chronic kidney disease. Her etiology for leg swelling is multifactorial.  She has undergone bilateral GSV RF ablation. She has not seen much improvement yet. I could part of it is because of her lymphedema. Will prescribe her lymphedema compression pumps. 03. Atherosclerotic heart disease of native coronary artery without angina pectoris (I25.10): Minimal CAD @ cath 2/2019.     We discussed the signs and symptoms of unstable angina, myocardial infarction and malignant arrhythmia. The patient knows to seek immediate medical attention should they occur. 04. Dilated cardiomyopathy (I42.0):  Mixed ischemic/non-ischemic (tachycardia mediated) CM previously. Her EF improved but then has worsened again as noted above. EF 30% 2/2019. The patient has an ICD in place. 05. Chronic combined systolic (congestive) and diastolic (congestive) heart failure (I50.42): The patient is compliant with their CHF regimen. is tolerating the current beta-blocker dose. 06. Longstanding persistent atrial fibrillation (I48.11): As per Dr. Dee Varela, off amiodarone & now s/p AV node ablation. She remains on anticoagulation. 07. Hyp hrt & chr kdny dis w hrt fail and stg 1-4/unsp chr kdny (I13.0):  Finally controlled with addition of Imdur. 08. Mixed hyperlipidemia (E78.2):  Lipid labs drawn by PCP. The patient is tolerating lipid lowering therapy well. 09. Chronic kidney disease, stage 3 (moderate) (N18.3):  Cr 1.24/GFR46 11/2015. Cr 1.32/GFR 43 1/2017. Cr 2 11/2018 after becky. Creatinine 1.5 12/2018. 10. Localized edema (R60.0):  Chronic. She avoids salt. Will evaluate for venous insufficiency. 11. Presence of automatic (implantable) cardiac defibrillator (Z95.810): This is a biventricular device. will continue to be followed in device clinic.    12. Type 2 diabetes mellitus with other specified complication (C30.41):  Lipids & HTN as noted above; DM managed by other MD.   13. Long term (current) use of aspirin (Z79.82): This condition is stable. 15. Long term (current) use of anticoagulants (Z79.01): This condition is stable. Indicated for atrial fibrillation. No bleeding. If she has recurrent falls, she knows to call for re-evaluation of anticoagulation.    15. Body mass index [BMI]40.0-44.9, adult (Z68.41)      ORDERS:  1 Dietary management education, guidance, and counseling     2 Return office visit with Dorothy Roman MD in 6 Months.           12/10/10 MEDICATION LIST  Medication Sig Desc   acetaminophen 500 mg capsule take 2 capsule by oral route  every 6 hours as needed   albuterol sulfate HFA 90 mcg/actuation aerosol inhaler inhale 2 puff by inhalation route  every 4 - 6 hours as needed   amlodipine 10 mg tablet take 1 tablet by oral route  every day   aspirin 81 mg tablet,delayed release take 1 tablet by oral route  every day   ATORVASTATIN CALCIUM 40 MG Tablet TAKE 1 TABLET EVERY EVENING   Ayr Saline 0.65 % nasal spray aerosol     bumetanide 2 mg tablet take 1 tablet by oral route  every day   Claritin 10 mg tablet take 1 tablet by oral route  every day   clonazepam 0.5 mg tablet take 1 tablet by oral route  every day   clotrimazole-betamethasone 1 %-0.05 % topical cream apply by topical route 2 times every day for 2 weeks to the affected and surrounding areas of skin in the morning and evening   cyclobenzaprine 10 mg tablet take 1 tablet by oral route 2 times every day   glimepiride 2 mg tablet take 1 tablet by oral route  every day   hydralazine 25 mg tablet take 1 tablet by oral route 2 times every day with food   isosorbide mononitrate ER 30 mg tablet,extended release 24 hr take 1 tablet by oral route  twice a day   lidocaine 4 % topical cream     lisinopril 20 mg tablet take 1 tablet by oral route  every day   Lyrica 200 mg capsule take 1 capsule by oral route 2 times every day   magnesium 400 mg (as magnesium oxide) tablet take 1 tablet by oral route  every day   metoprolol succinate  mg tablet,extended release 24 hr TAKE 1 TABLET EVERY DAY   nystatin 100,000 unit/gram topical cream apply by topical route 2 times every day to the affected area(s) as needed   omeprazole 40 mg capsule,delayed release take 1 capsule by oral route  every day before a meal   potassium chloride ER 10 mEq capsule,extended release take 1 Capsule by oral route 3 times daily   Pradaxa 150 mg capsule take 1 capsule by oral route 2 times every day   Premarin 0.625 mg/gram vaginal cream insert 0.5 applicatorful by vaginal route  every day cyclically, 3 weeks on and 1 week off   Symbicort 160 mcg-4.5 mcg/actuation HFA aerosol inhaler inhale 2 puff by inhalation route 2 times every day in the morning and evening   Ultram 50 mg tablet take 1 tablet by oral route  every 6 hours as needed   venlafaxine  mg capsule,extended release 24 hr take 1 capsule by oral route 2 times every day   Vitamin D3 1,000 unit capsule take 1 daily         CARDIAC HISTORY  CAD:  1 NSTEMI [95% Proximal LAD, Resolute (ISELA) to the Proximal LAD.] - 4/8/2014      CHF/CM:  1 Mixed ischemic/tachycardic CM. [Nl TSH.] - 4/2014   2 Ischemic & tachycardic Cardiomyopathy [Echo (EF 0.45) - 06/17/2014, (prev EF 20-30%)] - 6/2014   3 Progressive Cardiomyopathy [Cardiac PET (EF .16) - 09/24/2018, No ischemia noted. EF improved but not normalized; no CAD cath 2/2019.] - 9/2018      ARRHYTHMIA:  1 A Fib [DCCV, Plan: amio started; Eliquis with Effient (change eliquis to asa if NSR after 30 days). ] - 4/8/2014   2 PAF - 8/2010   3 A Fib, recurrent; and 6/2014. [Had marked sinus bradycardia while on bblocker/dig.] - 5/2014   4 Sinus Bradycardia. - 6/2/2014   5 PPM (Devices (Dual Chamber PPM (Bill Em)) - 7/17/2014) - 7/17/2014   6 PAF [CVR; Eliquis restarted (plavix stopped). ] - 9/2015   7 A Fib [RFA] - 4/2016   8 A Fib [RFA] - 1/2017   9 Chronic A Fib [PPM to BiV device, then AV node ablation. Amiodarone stopped. ] - 10/2018      RISK FACTORS:  1 Diabetes [Diagnosed in 2014]   2 Hypertension   3 Dyslipidemia   4 Tobacco Use: No/never         CARDIOVASCULAR PROCEDURES  Procedure Date Results   Cardiac PET 09/24/2018 EF 0.16 (16%), physiologic apical thinning with no ischemia   Cath 02/22/2019 Minimal CAD   Cath 04/08/2014 95% Proximal LAD, Resolute (ISELA) to the Proximal LAD. DCCV 04/08/2014 Initial Rhythm A Fib, Final Rhythm Sinus   Devices 02/08/2019 Bi-Ventricular ICD (St. Don), 100% Afib. BIV pacing >99% (prior AV node ablation)   Devices 07/17/2014 Dual Chamber PPM (St. [id number])   Echo 08/11/2020 LVE; EF 35-40%; normal RV.  minimal AS (mean 9). PAp 43; at [facility]. Echo 08/17/2019 EF 20-25%; mild-to-moderate MR. Moderate TR. Low normal RV function. At [facility]. Echo 02/06/2019 EF 0.30 (30%), Mild MR, Mild TR, Mild LVH, Aortic Sclerosis, LA Diam 3.9 cm, Est RVSP 40 mmHg, global hypokinesis, worse in the inferior wall & apex. Normal RV. Echo 10/08/2018 EF 0.15 (15%), Aortic Sclerosis, Mild MR, Est RVSP 23 mmHg, EF 15-20%; normal RV. At [facility]. Echo 11/28/2017 EF 0.45 (45%), Mild Global Hypo, Mild TR, Mild LVH, Mild MR, Est RVSP 41 mmHg, Normal RV. (probable trileaflet AoV). Echo 10/30/2015 EF 0.45 (45%), Mild LVH, LA Diam 4.1 cm, Est RVSP 35 mmHg, Normal RV. ?bicuspid AoV; (Prob trileaflet on previous ANGELITO). Echo 06/17/2014 EF 0.45 (45%), EF range 45%-50%, Mild LV dilatation. Mild LV systolic dysfunction. ? Bicuspid AoV but prob trileafet on previous ANGELITO. Echo 04/03/2014 EF 0.20 (20%), global HK with lateral sparing; no valve disease. EKG 12/07/2020 Atrial Fib, Paced Rhythm   Holter 06/09/2014 No Malignant Arrhythmias, Atrial Fib, No correlation with symptoms, (, [address]   Holter 04/30/2014 No Malignant Arrhythmias, Atrial Fib, No correlation with symptoms, \"light or heavy chest\" = afib in 60s. HR  (ave 63). ASx pauses (upto 2.8) while asleep.    RFA 01/19/2017 Indication A Fib, Final Rhythm Sinus   RFA   Indication A Fib   Sleep Study   OSAS: Moderate   ANGELITO 04/08/2014 EF 0.35 (35%), No BRAYDON Clot, prob trileaflet AoV. Venous Duplex 04/16/2020 Left:  1. Post venous procedure duplex exam shows no evidence of thrombus in the saphenofemoral junction. 2. Left mid calf positive  5mm in diameter and 2.8s in reflux.     Right:  Right distal calf positive  4.1mm in diameter and .53s in reflux. Venous Duplex 03/27/2020 Post venous ablation duplex exam shows no evidence of heat induced thrombus in the right common femoral vein. Venous Duplex 02/19/2020 Exam was conducted with patient in a high reverse-trendelenburg position. The (right/left/bilateral) great saphenous vein is incompetent (above/below the knee) (throughout the lower extremity) with retrograde flow >0.5seconds.        For other plans, see orders.   Hospital problem list     Active Hospital Problems    Diagnosis Date Noted    Obesity (BMI 30-39.9) 04/30/2019     Priority: 1 - One    Ischemic cardiomyopathy 11/13/2018     Priority: 1 - One    Stage 3 chronic kidney disease 11/13/2018     Priority: 1 - One    Hypercholesterolemia 01/22/2018     Priority: 1 - One    Lower back pain 12/13/2020    Hypoxia 69/49/9048    Systolic CHF, chronic ([facility] [address] 06/05/2016    Chronic atrial fibrillation ([facility] [address] 06/05/2016    Type 2 diabetes mellitus with diabetic neuropathy, without long-term current use of insulin ([address] 61/09/2516    Acute systolic heart failure ([facility] [address] 04/04/2014        Subjective: Decharly Bass reports       Chest pain X none  consistent with:  Non-cardiac CP         Atypical CP     None now  On going  Anginal CP     Dyspnea X none  at rest  with exertion         improved  unchanged  worse              PND X none  overnight       Orthopnea X none  improved  unchanged  worse   Presyncope X none  improved  unchanged  worse     Ambulated in hallway without symptoms   Yes   Ambulated in room without symptoms  Yes   Objective:     Physical Exam:  Overall VSSAF;    Visit Vitals  BP (!) 165/87 (BP 1 Location: Left arm, BP Patient Position: At rest)   Pulse 80   Temp 98.1 °F (36.7 °C)   Resp 18   Ht 5' 9\" (1.753 m)   Wt 128.4 kg (283 lb)   SpO2 93%   BMI 41.79 kg/m²     Temp (24hrs), Av.4 °F (36.9 °C), Min:98.1 °F (36.7 °C), Max:98.8 °F (37.1 °C)    Patient Vitals for the past 8 hrs:   Pulse   20 0803 80     Patient Vitals for the past 8 hrs:   Resp   20 0803 18     Patient Vitals for the past 8 hrs:   BP   20 0803 (!) 165/87       Intake/Output Summary (Last 24 hours) at 2020 0115  Last data filed at 12/15/2020 2018  Gross per 24 hour   Intake    Output 800 ml   Net -800 ml     General Appearance: Well developed, obese, no acute distress. Ears/Nose/Mouth/Throat:   Normal MM; anicteric. JVP: WNL   Resp:   Lungs clear to auscultation bilaterally. Nl resp effort. Cardiovascular:  IRRR, S1, S2 normal, no new murmur. No gallop or rub. Abdomen:   Soft, non-tender, bowel sounds are present. Extremities: Mild edema bilaterally. Chronic stasis. Skin:  Neuro: Warm and dry. A/O x3, grossly nonfocal       cath site intact w/o hematoma or new bruit; distal pulse unchanged  Yes   Data Review:     Telemetry none  sinus  chronic afib  parox afib  NSVT     ECG independently reviewed  NSR  afib  no significant changes  NSST-Tw chgs     x no new ECG provided for review   Lab results reviewed as noted below. Current medications reviewed as noted below. No results for input(s): PH, PCO2, PO2 in the last 72 hours.   Recent Labs     20  1309   TROIQ <0.05     Recent Labs     20  0453 12/15/20  0431 20  0416 20  1309    143 145 143   K 3.4* 2.9* 3.6 3.5   * 109* 112* 110*   CO2 29 32 30 31   BUN 16 17 16 23*   CREA 1.40* 1.54* 1.38* 1.57*   GFRAA 45* 41* 46* 40*   * 108* 99 103*   CA 8.9 8.9 8.9 9.0   ALB  --   --   --  3.1*   WBC  --  5.3 6.4 7.9   HGB  --  8.9* 9.1* 10.1*   HCT  --  31.0* 32.2* 35.1   PLT  --  153 159 178     Lab Results   Component Value Date/Time    Cholesterol, total 123 08/10/2020 03:55 AM    HDL Cholesterol 56 08/10/2020 03:55 AM    LDL, calculated 53 08/10/2020 03:55 AM    Triglyceride 70 08/10/2020 03:55 AM    CHOL/HDL Ratio 2.2 08/10/2020 03:55 AM     Recent Labs     12/13/20  1309   *   TP 6.8   ALB 3.1*   GLOB 3.7     No results for input(s): INR, PTP, APTT, INREXT, INREXT in the last 72 hours.    No components found for: GLPOC    Current Facility-Administered Medications   Medication Dose Route Frequency    aluminum-magnesium hydroxide (MAALOX) oral suspension 15 mL  15 mL Oral QID PRN    albuterol (PROVENTIL HFA, VENTOLIN HFA, PROAIR HFA) inhaler 2 Puff  2 Puff Inhalation Q6H RT    fluticasone-vilanterol (BREO ELLIPTA) 200mcg-25mcg/puff  1 Puff Inhalation DAILY    bumetanide (BUMEX) injection 2 mg  2 mg IntraVENous DAILY    aspirin chewable tablet 81 mg  81 mg Oral DAILY    atorvastatin (LIPITOR) tablet 40 mg  40 mg Oral QHS    calcium carbonate (OS-YEFRI) tablet 500 mg [elemental]  500 mg Oral DAILY    cholecalciferol (VITAMIN D3) (400 Units /10 mcg) tablet 1 Tab  400 Units Oral DAILY    clonazePAM (KlonoPIN) tablet 0.5 mg  0.5 mg Oral QHS    cyclobenzaprine (FLEXERIL) tablet 10 mg  10 mg Oral TID PRN    dabigatran etexilate (PRADAXA) capsule 150 mg  150 mg Oral BID    diphenhydrAMINE (BENADRYL) capsule 25 mg  25 mg Oral BID PRN    hydrALAZINE (APRESOLINE) tablet 25 mg  25 mg Oral BID    isosorbide mononitrate ER (IMDUR) tablet 60 mg  60 mg Oral DAILY    lisinopriL (PRINIVIL, ZESTRIL) tablet 20 mg  20 mg Oral DAILY    metoprolol succinate (TOPROL-XL) XL tablet 100 mg  100 mg Oral DAILY    pantoprazole (PROTONIX) tablet 40 mg  40 mg Oral ACB    potassium chloride SR (KLOR-CON 10) tablet 10 mEq  10 mEq Oral TID    pregabalin (LYRICA) capsule 300 mg  300 mg Oral BID    traMADoL (ULTRAM) tablet 50 mg  50 mg Oral DAILY PRN    venlafaxine-SR (EFFEXOR-XR) capsule 150 mg  150 mg Oral DAILY WITH BREAKFAST    sodium chloride (NS) flush 5-40 mL  5-40 mL IntraVENous Q8H    sodium chloride (NS) flush 5-40 mL  5-40 mL IntraVENous PRN    acetaminophen (TYLENOL) tablet 650 mg  650 mg Oral Q6H PRN    Or    acetaminophen (TYLENOL) suppository 650 mg  650 mg Rectal Q6H PRN    polyethylene glycol (MIRALAX) packet 17 g  17 g Oral DAILY PRN    promethazine (PHENERGAN) tablet 12.5 mg  12.5 mg Oral Q6H PRN    Or    ondansetron (ZOFRAN) injection 4 mg  4 mg IntraVENous Q6H PRN    insulin lispro (HUMALOG) injection   SubCUTAneous AC&HS    glucose chewable tablet 16 g  4 Tab Oral PRN    dextrose (D50W) injection syrg 12.5-25 g  12.5-25 g IntraVENous PRN    glucagon (GLUCAGEN) injection 1 mg  1 mg IntraMUSCular PRN    cephALEXin (KEFLEX) capsule 500 mg  500 mg Oral QID        Morena Banks MD

## 2020-12-16 NOTE — PROGRESS NOTES
End of Shift Note    Bedside shift change report given to Citlali Gray (oncoming nurse) by Clary Long (offgoing nurse). Report included the following information SBAR, Kardex, Intake/Output and MAR    Shift worked:  7a-7p     Shift summary and any significant changes:    Pt remained stable throughout shift. Scheduled meds given, PRN meds, education provided. Hourly rounding completed, Pt remained on bedrest for shift. Pt weaned off O2. Concerns for physician to address:       Zone phone for oncoming shift:          Activity:  Activity Level: Bed Rest  Number times ambulated in hallways past shift: 0  Number of times OOB to chair past shift: 0    Cardiac:   Cardiac Monitoring: No      Cardiac Rhythm: Paced    Access:   Current line(s): PIV     Genitourinary:   Urinary status: voiding and external catheter    Respiratory:   O2 Device: Room air  Chronic home O2 use?: NO  Incentive spirometer at bedside: NO     GI:  Last Bowel Movement Date: 12/11/20  Current diet:  DIET DIABETIC CONSISTENT CARB Regular; FR 1000ML  Passing flatus: YES  Tolerating current diet: YES       Pain Management:   Patient states pain is manageable on current regimen: YES    Skin:  Preet Score: 15  Interventions: turn team, float heels and increase time out of bed    Patient Safety:  Fall Score:  Total Score: 2  Interventions: bed/chair alarm, gripper socks and pt to call before getting OOB       Length of Stay:  Expected LOS: - - -  Actual LOS: [address]

## 2020-12-16 NOTE — PROGRESS NOTES
Pharmacy Medication Reconciliation     The patient was interviewed regarding current PTA medication list, use and drug allergies. The patient was questioned regarding use of any other inhalers, topical products, over the counter medications, herbal medications, vitamin products or ophthalmic/nasal/otic medication use. Allergy Update: Sulfa (sulfonamide antibiotics) and Amoxicillin    Recommendations/Findings: The following amendments were made to the patient's active medication list on file at [facility]:   1) Additions: oxymetazoline 0.5% nasal q12h prn nose bleed                         Ponaris nasal drop 1-2 gtts qhs prn    2) Deletions: none    3) Changes: none      Pertinent Findings: none    -Clarified PTA med list with the patient. PTA medication list was corrected to the following:     Prior to Admission Medications   Prescriptions Last Dose Informant Taking? SYMBICORT 160-4.5 mcg/actuation HFAA 11/16/2020 at Unknown time  Yes   Sig: INHALE 2 PUFFS BY MOUTH TWICE DAILY   acetaminophen (TYLENOL EXTRA STRENGTH) 500 mg tablet 12/9/2020 at Unknown time Self Yes   Sig: Take 1,000 mg by mouth every eight (8) hours as needed for Pain (Headache). albuterol (PROVENTIL HFA, VENTOLIN HFA, PROAIR HFA) 90 mcg/actuation inhaler 12/9/2020 at Unknown time Self Yes   Sig: Take 1 Puff by inhalation every four (4) hours as needed for Wheezing. Patient taking differently: Take 1 Puff by inhalation two (2) times daily as needed for Wheezing. amLODIPine (NORVASC) 2.5 mg tablet 11/16/2020 at Unknown time Self Yes   Sig: Take 1 Tab by mouth daily. Patient taking differently: Take 10 mg by mouth daily. aspirin 81 mg chewable tablet 11/16/2020 at Unknown time Self Yes   Sig: Take 81 mg by mouth daily. atorvastatin (LIPITOR) 40 mg tablet 11/16/2020 at Unknown time Self Yes   Sig: Take 1 Tab by mouth nightly. bumetanide (BUMEX) 2 mg tablet 11/16/2020 at Unknown time  Yes   Sig: Take 1 Tab by mouth daily.    calcium phosphate-vitamin D3 (Caltrate Gummy Bites) 250 mg-10 mcg (400 unit) chew 2020 at Unknown time  Yes   Sig: Take 2 Each by mouth daily. caltrate bone health gummies   cholecalciferol, vitamin d3, (VITAMIN D3) 400 unit cap 2020 at Unknown time Self Yes   Sig: Take 400 Units by mouth daily. clonazePAM (KlonoPIN) 0.5 mg tablet 2020 at Unknown time  Yes   Sig: TAKE 1 TABLET BY MOUTH NIGHTLY . DO NOT EXCEED  0.5MG  PER  DAY   clotrimazole-betamethasone (LOTRISONE) topical cream 2020 at Unknown time  Yes   Sig: APPLY TO THE AFFECTED AREA 2 TIMES DAILY   conjugated estrogens (PREMARIN) 0.625 mg/gram vaginal cream 2020 at Unknown time Self Yes   Sig: Insert 0.5 g into vagina two (2) times a week.  and     cyclobenzaprine (FLEXERIL) 10 mg tablet 2020 at Unknown time  Yes   Sig: Take 1 tablet by mouth three times daily as needed for muscle spasm   dabigatran etexilate (PRADAXA) 150 mg capsule 2020 at Unknown time Self Yes   Sig: Take 1 Cap by mouth two (2) times a day. IF NO BLEEDING AT CATH SITE. diphenhydrAMINE (BENADRYL) 25 mg capsule 2020 at Unknown time Self Yes   Sig: Take 25 mg by mouth two (2) times a day. eucalyptus-peppermint oil (Ponaris) soln 2020 at Unknown time  Yes   Si-2 Drops by Nasal route nightly as needed. glimepiride (AMARYL) 2 mg tablet 2020 at Unknown time  Yes   Sig: Take 1 tablet by mouth twice daily   hydrALAZINE (APRESOLINE) 25 mg tablet 2020 at Unknown time  Yes   Sig: Take 25 mg by mouth two (2) times a day. isosorbide mononitrate ER (IMDUR) 60 mg CR tablet 2020 at Unknown time  Yes   Sig: Take 1 Tab by mouth daily. lidocaine (ASPERCREME, LIDOCAINE,) 4 % topical cream 2020 at Unknown time Self Yes   Sig: Apply  to affected area daily as needed for Pain (Knee pain). lisinopriL (PRINIVIL, ZESTRIL) 40 mg tablet 2020 at Unknown time  Yes   Sig: Take 0.5 Tabs by mouth daily. loratadine (Claritin) 10 mg tablet 2020 at Unknown time  Yes   Sig: Take 10 mg by mouth two (2) times a day. Indications: Patient states she is using Claritin instead of Benadryl, is not taking Benadryl per patient.   magnesium oxide (MAG-OX) 400 mg tablet 2020 at Unknown time  Yes   Sig: Take 1 tablet by mouth twice daily   metoprolol succinate (TOPROL-XL) 100 mg tablet 2020 at Unknown time Self Yes   Sig: Take 1 Tab by mouth daily. mupirocin calcium (BACTROBAN) 2 % topical cream 2020 at Unknown time  Yes   Sig: Apply  to affected area two (2) times a day. nystatin (MYCOSTATIN) topical cream 2020 at Unknown time Self Yes   Sig: Apply  to affected area two (2) times daily as needed for Skin Irritation (Rash). omeprazole (PRILOSEC) 40 mg capsule 2020 at Unknown time  Yes   Sig: TAKE 1 CAPSULE EVERY DAY   oxymetazoline 0.05 % mist 2020 at Unknown time  Yes   Si Sprays by Nasal route every twelve (12) hours as needed (nose bleed). potassium chloride SR (KLOR-CON 10) 10 mEq tablet 2020 at Unknown time  Yes   Sig: Take 1 Tab by mouth three (3) times daily. pregabalin (Lyrica) 300 mg capsule 2020 at Unknown time  Yes   Sig: Take 300 mg by mouth two (2) times a day. sodium chloride (AYR SALINE) 0.65 % nasal squeeze bottle 2020 at Unknown time Self Yes   Si Sprays by Both Nostrils route every eight (8) hours as needed. traMADol (ULTRAM) 50 mg tablet 2020 at Unknown time Self Yes   Sig: Take 50 mg by mouth daily as needed for Pain (Used to supplement the Lyrica when lyrica doesnt manage the pain on its own). triamcinolone acetonide (KENALOG) 0.1 % topical cream 2020 at Unknown time  Yes   Sig: APPLY A THIN FILM OF CREAM EXTERNALLY TO AFFECTED AREA TWICE DAILY   venlafaxine-SR (EFFEXOR XR) 150 mg capsule 2020 at Unknown time Self Yes   Sig: Take 150 mg by mouth two (2) times daily (with meals).       Facility-Administered Medications: None        Thank you,  Aixa Duncan, [place]

## 2020-12-17 LAB
ANION GAP SERPL CALC-SCNC: 3 MMOL/L (ref 5–15)
BUN SERPL-MCNC: 19 MG/DL (ref 6–20)
BUN/CREAT SERPL: 14 (ref 12–20)
CALCIUM SERPL-MCNC: 9.1 MG/DL (ref 8.5–10.1)
CHLORIDE SERPL-SCNC: 109 MMOL/L (ref 97–108)
CO2 SERPL-SCNC: 30 MMOL/L (ref 21–32)
CREAT SERPL-MCNC: 1.37 MG/DL (ref 0.55–1.02)
ERYTHROCYTE [DISTWIDTH] IN BLOOD BY AUTOMATED COUNT: 19.3 % (ref 11.5–14.5)
GLUCOSE BLD STRIP.AUTO-MCNC: 117 MG/DL (ref 65–100)
GLUCOSE BLD STRIP.AUTO-MCNC: 133 MG/DL (ref 65–100)
GLUCOSE BLD STRIP.AUTO-MCNC: 157 MG/DL (ref 65–100)
GLUCOSE BLD STRIP.AUTO-MCNC: 175 MG/DL (ref 65–100)
GLUCOSE SERPL-MCNC: 111 MG/DL (ref 65–100)
HCT VFR BLD AUTO: 30.6 % (ref 35–47)
HGB BLD-MCNC: 9 G/DL (ref 11.5–16)
MCH RBC QN AUTO: 24.3 PG (ref 26–34)
MCHC RBC AUTO-ENTMCNC: 29.4 G/DL (ref 30–36.5)
MCV RBC AUTO: 82.7 FL (ref 80–99)
NRBC # BLD: 0.03 K/UL (ref 0–0.01)
NRBC BLD-RTO: 0.6 PER 100 WBC
PLATELET # BLD AUTO: 155 K/UL (ref 150–400)
PMV BLD AUTO: 12.5 FL (ref 8.9–12.9)
POTASSIUM SERPL-SCNC: 3.5 MMOL/L (ref 3.5–5.1)
RBC # BLD AUTO: 3.7 M/UL (ref 3.8–5.2)
SERVICE CMNT-IMP: ABNORMAL
SODIUM SERPL-SCNC: 142 MMOL/L (ref 136–145)
WBC # BLD AUTO: 5.4 K/UL (ref 3.6–11)

## 2020-12-17 PROCEDURE — 80048 BASIC METABOLIC PNL TOTAL CA: CPT

## 2020-12-17 PROCEDURE — 94640 AIRWAY INHALATION TREATMENT: CPT

## 2020-12-17 PROCEDURE — 83880 ASSAY OF NATRIURETIC PEPTIDE: CPT

## 2020-12-17 PROCEDURE — 74011250636 HC RX REV CODE- 250/636: Performed by: NURSE PRACTITIONER

## 2020-12-17 PROCEDURE — 36415 COLL VENOUS BLD VENIPUNCTURE: CPT

## 2020-12-17 PROCEDURE — 65270000029 HC RM PRIVATE

## 2020-12-17 PROCEDURE — 74011636637 HC RX REV CODE- 636/637: Performed by: STUDENT IN AN ORGANIZED HEALTH CARE EDUCATION/TRAINING PROGRAM

## 2020-12-17 PROCEDURE — 74011250637 HC RX REV CODE- 250/637: Performed by: NURSE PRACTITIONER

## 2020-12-17 PROCEDURE — 82962 GLUCOSE BLOOD TEST: CPT

## 2020-12-17 PROCEDURE — 74011250637 HC RX REV CODE- 250/637: Performed by: INTERNAL MEDICINE

## 2020-12-17 PROCEDURE — 94760 N-INVAS EAR/PLS OXIMETRY 1: CPT

## 2020-12-17 PROCEDURE — 85027 COMPLETE CBC AUTOMATED: CPT

## 2020-12-17 PROCEDURE — 74011000250 HC RX REV CODE- 250: Performed by: INTERNAL MEDICINE

## 2020-12-17 PROCEDURE — 74011250637 HC RX REV CODE- 250/637: Performed by: STUDENT IN AN ORGANIZED HEALTH CARE EDUCATION/TRAINING PROGRAM

## 2020-12-17 RX ORDER — HYDRALAZINE HYDROCHLORIDE 25 MG/1
25 TABLET, FILM COATED ORAL
Status: COMPLETED | OUTPATIENT
Start: 2020-12-17 | End: 2020-12-17

## 2020-12-17 RX ORDER — BUMETANIDE 1 MG/1
2 TABLET ORAL DAILY
Status: DISCONTINUED | OUTPATIENT
Start: 2020-12-18 | End: 2020-12-19

## 2020-12-17 RX ORDER — HYDRALAZINE HYDROCHLORIDE 50 MG/1
50 TABLET, FILM COATED ORAL 2 TIMES DAILY
Status: DISCONTINUED | OUTPATIENT
Start: 2020-12-17 | End: 2020-12-24 | Stop reason: HOSPADM

## 2020-12-17 RX ORDER — BUMETANIDE 0.25 MG/ML
2 INJECTION INTRAMUSCULAR; INTRAVENOUS DAILY
Status: COMPLETED | OUTPATIENT
Start: 2020-12-17 | End: 2020-12-17

## 2020-12-17 RX ADMIN — ALBUTEROL SULFATE 2 PUFF: 90 AEROSOL, METERED RESPIRATORY (INHALATION) at 15:08

## 2020-12-17 RX ADMIN — ENOXAPARIN SODIUM 120 MG: 120 INJECTION SUBCUTANEOUS at 12:48

## 2020-12-17 RX ADMIN — PANTOPRAZOLE SODIUM 40 MG: 40 TABLET, DELAYED RELEASE ORAL at 12:46

## 2020-12-17 RX ADMIN — ENOXAPARIN SODIUM 120 MG: 120 INJECTION SUBCUTANEOUS at 22:09

## 2020-12-17 RX ADMIN — INSULIN LISPRO 2 UNITS: 100 INJECTION, SOLUTION INTRAVENOUS; SUBCUTANEOUS at 13:54

## 2020-12-17 RX ADMIN — Medication 10 ML: at 14:37

## 2020-12-17 RX ADMIN — Medication 1 TABLET: at 12:46

## 2020-12-17 RX ADMIN — Medication 10 ML: at 22:11

## 2020-12-17 RX ADMIN — INSULIN LISPRO 2 UNITS: 100 INJECTION, SOLUTION INTRAVENOUS; SUBCUTANEOUS at 17:51

## 2020-12-17 RX ADMIN — VENLAFAXINE HYDROCHLORIDE 150 MG: 150 CAPSULE, EXTENDED RELEASE ORAL at 12:47

## 2020-12-17 RX ADMIN — ALBUTEROL SULFATE 2 PUFF: 90 AEROSOL, METERED RESPIRATORY (INHALATION) at 08:38

## 2020-12-17 RX ADMIN — ISOSORBIDE MONONITRATE 60 MG: 30 TABLET, EXTENDED RELEASE ORAL at 12:45

## 2020-12-17 RX ADMIN — HYDRALAZINE HYDROCHLORIDE 25 MG: 25 TABLET, FILM COATED ORAL at 12:45

## 2020-12-17 RX ADMIN — FLUTICASONE FUROATE AND VILANTEROL TRIFENATATE 1 PUFF: 200; 25 POWDER RESPIRATORY (INHALATION) at 08:37

## 2020-12-17 RX ADMIN — ALBUTEROL SULFATE 2 PUFF: 90 AEROSOL, METERED RESPIRATORY (INHALATION) at 19:29

## 2020-12-17 RX ADMIN — ASPIRIN 81 MG CHEWABLE TABLET 81 MG: 81 TABLET CHEWABLE at 12:47

## 2020-12-17 RX ADMIN — PREGABALIN 300 MG: 150 CAPSULE ORAL at 12:46

## 2020-12-17 RX ADMIN — ATORVASTATIN CALCIUM 40 MG: 40 TABLET, FILM COATED ORAL at 22:09

## 2020-12-17 RX ADMIN — POTASSIUM CHLORIDE 20 MEQ: 20 TABLET, EXTENDED RELEASE ORAL at 22:09

## 2020-12-17 RX ADMIN — POTASSIUM CHLORIDE 20 MEQ: 20 TABLET, EXTENDED RELEASE ORAL at 12:47

## 2020-12-17 RX ADMIN — CALCIUM 500 MG: 500 TABLET ORAL at 12:47

## 2020-12-17 RX ADMIN — CEPHALEXIN 500 MG: 250 CAPSULE ORAL at 18:58

## 2020-12-17 RX ADMIN — POTASSIUM CHLORIDE 20 MEQ: 20 TABLET, EXTENDED RELEASE ORAL at 18:58

## 2020-12-17 RX ADMIN — HYDRALAZINE HYDROCHLORIDE 50 MG: 50 TABLET, FILM COATED ORAL at 18:58

## 2020-12-17 RX ADMIN — HYDROCODONE BITARTRATE AND ACETAMINOPHEN 1 TABLET: 5; 325 TABLET ORAL at 11:58

## 2020-12-17 RX ADMIN — CEPHALEXIN 500 MG: 250 CAPSULE ORAL at 12:47

## 2020-12-17 RX ADMIN — BUMETANIDE 2 MG: 0.25 INJECTION INTRAMUSCULAR; INTRAVENOUS at 11:00

## 2020-12-17 RX ADMIN — CLONAZEPAM 0.5 MG: 0.5 TABLET ORAL at 22:09

## 2020-12-17 RX ADMIN — CEPHALEXIN 500 MG: 250 CAPSULE ORAL at 22:10

## 2020-12-17 RX ADMIN — METOPROLOL SUCCINATE 100 MG: 50 TABLET, EXTENDED RELEASE ORAL at 12:46

## 2020-12-17 RX ADMIN — PREGABALIN 300 MG: 150 CAPSULE ORAL at 18:58

## 2020-12-17 RX ADMIN — Medication 10 ML: at 06:24

## 2020-12-17 NOTE — PROGRESS NOTES
Cardiology Progress Note  12/17/2020     Admit Date: 12/13/2020  Admit Diagnosis: Acute respiratory failure with hypoxemia (HCC) [J96.01]  CC: none currently  Cardiologist:  Dr Dharmesh Roa. Cardiac Assessment/Plan:    1) CAD (Prox LAD ISELA 4/2014 for NQWMI), Neg PET for ischemia 9/2018. Min CAD 2/2019.  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 Kaylynn Jt was too expensive. *EF 15-20% 9/2018. EF 30% w/ minimal CAD at cath 2/2019. EF 20-25% (m-mod MR; mod TR) 8/2019. *EF 35-40% 8/2020 (admit for CHF: too much salt). 3) HTN,   4) AFIB: PAfib (RFA 4/2016 and 1/2017), Recurrent/persistent afib 2017/2018: AV node ablation 10/2018. *Chronic AC w/ pradaxa. 5) DM,   6) Dyslipidemia   7) CKD III: Aldactone stopped in 2014. Cr 2 11/2018 (after becky): Cr 1.5 12/2018 & 8/2019; Cr 1.4/gfr 39 12/2020 (Dr. Alex Pavon). 8) [facility] Don PPM 7/2014 for bradycardia while on therapy for AFib: changed to BiV ICD 10/2018.  9) nephrolithiasis  10) SHAYLA (on CPAP),   11) cholecystectomy 11/2018.  12) LE venous insufficiency:  B SFV ablation 3/2020. Continues to use compression stockings/wraps. Obesity: 240# 2014; 262# 9/2019. 270 to 281# 2020. 279# 8/2020; 281# 12/2020.     Imp 12/14: stable cardiac status; not grossly overloaded;   Lymphedema compression pumps are pending as noted below. No plan for invasive cardiac testing; Dispo per primary team.      12/15: BPs 120-170; HR 80s; 94% 4L. -300 per I/O. Wt 287# to 283#. Hg 8.9; K 2.9; Cr 1.54. Cardiac meds; asa 81; lipitor 40; bumex 2 IV qday; pradaxa 150 bid; hydralazine 25 bid; Imdur 60; lisinopril 20; toprol ;     HypoK being Rxed by primary team; Cont IV bumex for now: PO soon. GI cocktail for epigastric/abd discomfort. Ortho/dispo per primary team.    12/16: No further abd discomfort. Laying supine; not OOB lately. BPs 140-170; hR 80s; 93% 2L. Wt pending; Inaccurate I/O (no Is recorded).   K 3.4; Cr 1.4  Cardiac meds; asa 81; lipitor 40; bumex 2 IV qday; pradaxa 150 bid; hydralazine 25 bid; Imdur 60; lisinopril 20; toprol ; (off norvasc). HypoK being Rxed by primary team; Cont IV bumex for now: PO soon. Ortho/dispo per primary team.  Increase hydralazine to 50 bid (may need tid). 12/17: BPs 120-150; HR 80; 94% RA. No wt yesterday nor today. No Is recorded. Hg 9; Cr 1.37; K 3.5. Cardiac meds; asa 81; lipitor 40; bumex 2 IV qday; hydralazine 25 bid; Imdur 60; lisinopril 20; toprol ; (off norvasc). Pradaxa (150 bid) is on hold for possible IR Rx of back. Pradaxa effect should be fully gone in 5 days; If she's going to remain off pradaxa for now, full dose lovenox should be used until told to stop by IR (typically 12 hrs before procedure). This was d/w primary team yesterday & she is on lovenox 120 bid. Will change bumex to PO starting tomorrow. Ortho/dispo per primary team.  Hydralazine to 50 bid.      ____________________________________________________________________  Pasha Massey is a 78 y.o. female presents with Acute respiratory failure with hypoxemia ([facility] [address] [J96.01].      As noted in H&P: \"PRESENT ILLNESS:     Nitza Luis is a 78 y.o.   female with past medical history significant for CHF, SHAYLA on CPAP, and spinal stenosis who presents with worsening of lower back pain over the last 3 weeks.  Patient has chronic lower back pain that is on and off for years.  She follows up with an orthopedic doctor and receives steroid injections almost every 2 months. Ron Dickerson last dose was in October. Kevin Service gets some relief with the steroid injection but the pain often recurs.  Over the last 3 weeks, her lower back pain has gotten worse to the extent she was unable to walk or get out of bed.  The pain has been persistent, 10/10, and occasionally radiates to the right leg.  Worse with any kind of movement, no relieving factor.  She has tried different analgesics and muscle relaxants with no significant improvement.  She denies subjective fever, bowel/bladder incontinence, or lower extremity weakness.  Patient's  called EMS for worsening back pain.  Per EMS patient was febrile with a temp of 100.7.  No record of fever in the ED.  In the ED patient was noted to have a sat of 82% on room air.  Patient states she has chronic shortness of breath and gets winded even with walking short distance.  She follows up with her pulmonologist and she was recently prescribed a pulse oximeter to monitor her oxygen but she has not gotten it yet. Red Camacho denies recent worsening of shortness of breath, cough, chest pain, myalgia, or fatigue.  She does have contact history with her son-in-law who has COVID infection.     Of note, patient also has left lower leg erythema and ulcer which started as a blister about a week ago. She was prescribed topical ointment/?antibiotics but hasn't seen any improvement. \"     ______________________________________________________________________     The patient reports no chest pain/pressure; no change in chronic dyspnea; no change in LE edema: plan for compression Rx as noted below.     No PND, orthopnea, palpitations, pre-syncope, syncope, peripheral edema, or decrease in exercise tolerance. No current complaints.     ECG: Afib; Vpacint. Tele: none  CXR: \"No acute findings. \" NO CHF. Notable labs: Neg COVID; Hg 9.1; Cr 1.38 from 1.57 from 1.4-1.5 range. Nl troponin x1; proBNP 1.8k  Nl TSH & LDL 53 8/2020.   _______________________________________________________________  Notable prior cardiac history:  @ OV 10/10/20:  Ms Diana Chaparro has a h/o:  1) CAD (Prox LAD ISELA 4/2014 for NQWMI), Neg PET for ischemia 9/2018. Min CAD 2/2019.  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 Jennetta Late was too expensive. *EF 15-20% 9/2018. EF 30% w/ minimal CAD at cath 2/2019. EF 20-25% (m-mod MR; mod TR) 8/2019. *EF 35-40% 8/2020 (admit for CHF: too much salt).   3) HTN,   4) AFIB: PAfib (RFA 4/2016 and 1/2017), Recurrent/persistent afib 2017/2018: AV node ablation 10/2018. *Chronic AC w/ pradaxa. 5) DM,   6) Dyslipidemia   7) CKD III: Aldactone stopped in 2014. Cr 2 11/2018 (after becky): Cr 1.5 12/2018 & 8/2019; Cr 1.4/gfr 39 12/2020 (Dr. Brenden Dash). 8) St Don PPM 7/2014 for bradycardia while on therapy for AFib: changed to BiV ICD 10/2018.  9) nephrolithiasis  10) SHAYLA (on CPAP),   11) cholecystectomy 11/2018.  12) LE venous insufficiency:  B SFV ablation 3/2020. Continues to use compression stockings/wraps. Obesity: 240# 2014; 262# 9/2019. 270 to 281# 2020. 279# 8/2020; 281# 12/2020.     7/2020:  No chest pain nor change in dyspnea. Using CPAP. Had palpitations x1 since last visit. No falling or bleeding.     8/2020:  Caffie Crofts last week for CHF; too much sodium. Since discharge, no chest pain nor palpitations. No change in dyspnea; less LE edema overall, (never fully resolves). Occasional lightheaded if she gets up too quickly. Complains of continued fatigue but does not wish home PT.     12/2020:  No chest pain; decreasing dyspnea. More prominent LE edema/erythema; sees Dr. Carlo Adame later this week.     Patient presents today for 6mo follow up. Continues to have swelling and redness. Reported that she has been doing leg exercises. Patient could not but the compression stockings due to constant fluid leak from left lower extremity. Patient has tried conservative management with compression stocking, exercises and leg elevation for last 6 months. Subsequently patient underwent bilateral GSV ablation. She did not find much improvement in her symptoms. Now patient is having hyperkeratosis of the skin on anterior aspect of the leg, oozing of fluid, she has stage III leg edema     IMPRESSION AND PLAN  01. Lymphedema, not elsewhere classified (I89.0):  Patient has been having chronic edema bilateral lower extremity.   She has been following the conservative management with compression stocking, exercises and leg elevation for last 6 months. Subsequently she underwent bilateral GSV ablation. But did not help much with her swelling. Now she is having hyperkeratosis of the skin in the anterior aspect of the legs, chronic wounds, constant oozing of fluid. Part of her leg edema is because of lymphedema. At this point I think lymphedema compression pumps will help with her swelling. 02. Chronic venous insufficiency (I87.2): She has bilateral lower extremity edema also has signs of chronic venous stasis with discoloration of anterior aspect of legs. She also has congestive heart failure and chronic kidney disease. Her etiology for leg swelling is multifactorial.  She has undergone bilateral GSV RF ablation. She has not seen much improvement yet. I could part of it is because of her lymphedema. Will prescribe her lymphedema compression pumps. 03. Atherosclerotic heart disease of native coronary artery without angina pectoris (I25.10): Minimal CAD @ cath 2/2019.     We discussed the signs and symptoms of unstable angina, myocardial infarction and malignant arrhythmia. The patient knows to seek immediate medical attention should they occur. 04. Dilated cardiomyopathy (I42.0):  Mixed ischemic/non-ischemic (tachycardia mediated) CM previously. Her EF improved but then has worsened again as noted above. EF 30% 2/2019. The patient has an ICD in place. 05. Chronic combined systolic (congestive) and diastolic (congestive) heart failure (I50.42): The patient is compliant with their CHF regimen. is tolerating the current beta-blocker dose. 06. Longstanding persistent atrial fibrillation (I48.11): As per Dr. Lincoln Cervantes, off amiodarone & now s/p AV node ablation. She remains on anticoagulation. 07. Hyp hrt & chr kdny dis w hrt fail and stg 1-4/unsp chr kdny (I13.0):  Finally controlled with addition of Imdur. 08. Mixed hyperlipidemia (E78.2):  Lipid labs drawn by PCP. The patient is tolerating lipid lowering therapy well. 09. Chronic kidney disease, stage 3 (moderate) (N18.3):  Cr 1.24/GFR46 11/2015. Cr 1.32/GFR 43 1/2017. Cr 2 11/2018 after becky. Creatinine 1.5 12/2018. 10. Localized edema (R60.0):  Chronic. She avoids salt. Will evaluate for venous insufficiency. 11. Presence of automatic (implantable) cardiac defibrillator (Z95.810): This is a biventricular device. will continue to be followed in device clinic. 12. Type 2 diabetes mellitus with other specified complication (S78.20):  Lipids & HTN as noted above; DM managed by other MD.   13. Long term (current) use of aspirin (Z79.82): This condition is stable. 15. Long term (current) use of anticoagulants (Z79.01): This condition is stable. Indicated for atrial fibrillation. No bleeding. If she has recurrent falls, she knows to call for re-evaluation of anticoagulation.    15. Body mass index [BMI]40.0-44.9, adult (Z68.41)      ORDERS:  1 Dietary management education, guidance, and counseling     2 Return office visit with Stephy Portillo MD in 6 Months.           12/10/10 MEDICATION LIST  Medication Sig Desc   acetaminophen 500 mg capsule take 2 capsule by oral route  every 6 hours as needed   albuterol sulfate HFA 90 mcg/actuation aerosol inhaler inhale 2 puff by inhalation route  every 4 - 6 hours as needed   amlodipine 10 mg tablet take 1 tablet by oral route  every day   aspirin 81 mg tablet,delayed release take 1 tablet by oral route  every day   ATORVASTATIN CALCIUM 40 MG Tablet TAKE 1 TABLET EVERY EVENING   Ayr Saline 0.65 % nasal spray aerosol     bumetanide 2 mg tablet take 1 tablet by oral route  every day   Claritin 10 mg tablet take 1 tablet by oral route  every day   clonazepam 0.5 mg tablet take 1 tablet by oral route  every day   clotrimazole-betamethasone 1 %-0.05 % topical cream apply by topical route 2 times every day for 2 weeks to the affected and surrounding areas of skin in the morning and evening   cyclobenzaprine 10 mg tablet take 1 tablet by oral route 2 times every day   glimepiride 2 mg tablet take 1 tablet by oral route  every day   hydralazine 25 mg tablet take 1 tablet by oral route 2 times every day with food   isosorbide mononitrate ER 30 mg tablet,extended release 24 hr take 1 tablet by oral route  twice a day   lidocaine 4 % topical cream     lisinopril 20 mg tablet take 1 tablet by oral route  every day   Lyrica 200 mg capsule take 1 capsule by oral route 2 times every day   magnesium 400 mg (as magnesium oxide) tablet take 1 tablet by oral route  every day   metoprolol succinate  mg tablet,extended release 24 hr TAKE 1 TABLET EVERY DAY   nystatin 100,000 unit/gram topical cream apply by topical route 2 times every day to the affected area(s) as needed   omeprazole 40 mg capsule,delayed release take 1 capsule by oral route  every day before a meal   potassium chloride ER 10 mEq capsule,extended release take 1 Capsule by oral route 3 times daily   Pradaxa 150 mg capsule take 1 capsule by oral route 2 times every day   Premarin 0.625 mg/gram vaginal cream insert 0.5 applicatorful by vaginal route  every day cyclically, 3 weeks on and 1 week off   Symbicort 160 mcg-4.5 mcg/actuation HFA aerosol inhaler inhale 2 puff by inhalation route 2 times every day in the morning and evening   Ultram 50 mg tablet take 1 tablet by oral route  every 6 hours as needed   venlafaxine  mg capsule,extended release 24 hr take 1 capsule by oral route 2 times every day   Vitamin D3 1,000 unit capsule take 1 daily         CARDIAC HISTORY  CAD:  1 NSTEMI [95% Proximal LAD, Resolute (ISELA) to the Proximal LAD.] - 4/8/2014      CHF/CM:  1 Mixed ischemic/tachycardic CM. [Nl TSH.] - 4/2014   2 Ischemic & tachycardic Cardiomyopathy [Echo (EF 0.45) - 06/17/2014, (prev EF 20-30%)] - 6/2014   3 Progressive Cardiomyopathy [Cardiac PET (EF .16) - 09/24/2018, No ischemia noted.   EF improved but not normalized; no CAD cath 2/2019.] - 9/2018      ARRHYTHMIA:  1 A Fib [DCCV, Plan: amio started; Eliquis with Effient (change eliquis to asa if NSR after 30 days). ] - 4/8/2014   2 PAF - 8/2010   3 A Fib, recurrent; and 6/2014. [Had marked sinus bradycardia while on bblocker/dig.] - 5/2014   4 Sinus Bradycardia. - 6/2/2014   5 PPM (Devices (Dual Chamber PPM (Lieutenant Verduzco)) - 7/17/2014) - 7/17/2014   6 PAF [CVR; Eliquis restarted (plavix stopped). ] - 9/2015   7 A Fib [RFA] - 4/2016   8 A Fib [RFA] - 1/2017   9 Chronic A Fib [PPM to BiV device, then AV node ablation. Amiodarone stopped. ] - 10/2018      RISK FACTORS:  1 Diabetes [Diagnosed in 2014]   2 Hypertension   3 Dyslipidemia   4 Tobacco Use: No/never         CARDIOVASCULAR PROCEDURES  Procedure Date Results   Cardiac PET 09/24/2018 EF 0.16 (16%), physiologic apical thinning with no ischemia   Cath 02/22/2019 Minimal CAD   Cath 04/08/2014 95% Proximal LAD, Resolute (ISELA) to the Proximal LAD. DCCV 04/08/2014 Initial Rhythm A Fib, Final Rhythm Sinus   Devices 02/08/2019 Bi-Ventricular ICD (St[facility]), 100% Afib. BIV pacing >99% (prior AV node ablation)   Devices 07/17/2014 Dual Chamber PPM ([id number])   Echo 08/11/2020 LVE; EF 35-40%; normal RV.  minimal AS (mean 9). PAp 43; at [facility]. Echo 08/17/2019 EF 20-25%; mild-to-moderate MR. Moderate TR. Low normal RV function. At [facility]. Echo 02/06/2019 EF 0.30 (30%), Mild MR, Mild TR, Mild LVH, Aortic Sclerosis, LA Diam 3.9 cm, Est RVSP 40 mmHg, global hypokinesis, worse in the inferior wall & apex. Normal RV. Echo 10/08/2018 EF 0.15 (15%), Aortic Sclerosis, Mild MR, Est RVSP 23 mmHg, EF 15-20%; normal RV. At [facility]. Echo 11/28/2017 EF 0.45 (45%), Mild Global Hypo, Mild TR, Mild LVH, Mild MR, Est RVSP 41 mmHg, Normal RV. (probable trileaflet AoV). Echo 10/30/2015 EF 0.45 (45%), Mild LVH, LA Diam 4.1 cm, Est RVSP 35 mmHg, Normal RV. ?bicuspid AoV; (Prob trileaflet on previous ANGELITO).    Echo 06/17/2014 EF 0.45 (45%), EF range 45%-50%, Mild LV dilatation. Mild LV systolic dysfunction. ? Bicuspid AoV but prob trileafet on previous ANGELITO. Echo 04/03/2014 EF 0.20 (20%), global HK with lateral sparing; no valve disease. EKG 12/07/2020 Atrial Fib, Paced Rhythm   Holter 06/09/2014 No Malignant Arrhythmias, Atrial Fib, No correlation with symptoms, (, [address]   Holter 04/30/2014 No Malignant Arrhythmias, Atrial Fib, No correlation with symptoms, \"light or heavy chest\" = afib in 60s. HR  (ave 63). ASx pauses (upto 2.8) while asleep. RFA 01/19/2017 Indication A Fib, Final Rhythm Sinus   RFA   Indication A Fib   Sleep Study   OSAS: Moderate   ANGELITO 04/08/2014 EF 0.35 (35%), No BRAYDON Clot, prob trileaflet AoV. Venous Duplex 04/16/2020 Left:  1. Post venous procedure duplex exam shows no evidence of thrombus in the saphenofemoral junction. 2. Left mid calf positive  5mm in diameter and 2.8s in reflux.     Right:  Right distal calf positive  4.1mm in diameter and .53s in reflux. Venous Duplex 03/27/2020 Post venous ablation duplex exam shows no evidence of heat induced thrombus in the right common femoral vein. Venous Duplex 02/19/2020 Exam was conducted with patient in a high reverse-trendelenburg position. The (right/left/bilateral) great saphenous vein is incompetent (above/below the knee) (throughout the lower extremity) with retrograde flow >0.5seconds.        For other plans, see orders.   Hospital problem list     Active Hospital Problems    Diagnosis Date Noted    Obesity (BMI 30-39.9) 04/30/2019     Priority: 1 - One    Ischemic cardiomyopathy 11/13/2018     Priority: 1 - One    Stage 3 chronic kidney disease 11/13/2018     Priority: 1 - One    Hypercholesterolemia 01/22/2018     Priority: 1 - One    Lower back pain 12/13/2020    Hypoxia 97/69/0264    Systolic CHF, chronic ([address] 06/05/2016    Chronic atrial fibrillation ([facility] [address] 06/05/2016    Type 2 diabetes mellitus with diabetic neuropathy, without long-term current use of insulin ([facility] [address]     Acute systolic heart failure ([facility] [address] 2014        Subjective: Xavier Herndon reports       Chest pain X none  consistent with:  Non-cardiac CP         Atypical CP     None now  On going  Anginal CP     Dyspnea X none  at rest  with exertion         improved  unchanged  worse              PND X none  overnight       Orthopnea X none  improved  unchanged  worse   Presyncope X none  improved  unchanged  worse     Ambulated in hallway without symptoms   Yes   Ambulated in room without symptoms  Yes   Objective:     Physical Exam:  Overall VSSAF;    Visit Vitals  BP (!) 150/78 (BP 1 Location: Left arm, BP Patient Position: At rest)   Pulse 80   Temp 98 °F (36.7 °C)   Resp 18   Ht 5' 9\" (1.753 m)   Wt 128.4 kg (283 lb)   SpO2 94%   BMI 41.79 kg/m²     Temp (24hrs), Av.2 °F (36.8 °C), Min:98 °F (36.7 °C), Max:98.3 °F (36.8 °C)    No data found. No data found. No data found. Intake/Output Summary (Last 24 hours) at 2020 0958  Last data filed at 2020 2250  Gross per 24 hour   Intake    Output 200 ml   Net -200 ml     General Appearance: Well developed, obese, no acute distress. Ears/Nose/Mouth/Throat:   Normal MM; anicteric. JVP: WNL   Resp:   Lungs clear to auscultation bilaterally. Nl resp effort. Cardiovascular:  IRRR, S1, S2 normal, no new murmur. No gallop or rub. Abdomen:   Soft, non-tender, bowel sounds are present. Extremities: Minimal edema bilaterally. Chronic stasis. Skin:  Neuro: Warm and dry. A/O x3, grossly nonfocal       cath site intact w/o hematoma or new bruit; distal pulse unchanged  Yes   Data Review:     Telemetry none  sinus  chronic afib  parox afib  NSVT     ECG independently reviewed  NSR  afib  no significant changes  NSST-Tw chgs     x no new ECG provided for review   Lab results reviewed as noted below. Current medications reviewed as noted below.     No results for input(s): PH, PCO2, PO2 in the last 72 hours. No results for input(s): CPK, CKMB, CKNDX, TROIQ in the last 72 hours. Recent Labs     12/17/20  0608 12/16/20  0453 12/15/20  0431    142 143   K 3.5 3.4* 2.9*   * 109* 109*   CO2 30 29 32   BUN 19 16 17   CREA 1.37* 1.40* 1.54*   GFRAA 46* 45* 41*   * 114* 108*   CA 9.1 8.9 8.9   WBC 5.4  --  5.3   HGB 9.0*  --  8.9*   HCT 30.6*  --  31.0*     --  153     Lab Results   Component Value Date/Time    Cholesterol, total 123 08/10/2020 03:55 AM    HDL Cholesterol 56 08/10/2020 03:55 AM    LDL, calculated 53 08/10/2020 03:55 AM    Triglyceride 70 08/10/2020 03:55 AM    CHOL/HDL Ratio 2.2 08/10/2020 03:55 AM     No results for input(s): AP, TBIL, TP, ALB, GLOB, GGT, AML, LPSE in the last 72 hours. No lab exists for component: SGOT, GPT, AMYP, HLPSE  No results for input(s): INR, PTP, APTT, INREXT, INREXT in the last 72 hours.    No components found for: GLPOC    Current Facility-Administered Medications   Medication Dose Route Frequency    HYDROcodone-acetaminophen (NORCO) 5-325 mg per tablet 1 Tab  1 Tab Oral Q6H PRN    enoxaparin (LOVENOX) injection 120 mg  120 mg SubCUTAneous Q12H    potassium chloride (K-DUR, KLOR-CON) SR tablet 20 mEq  20 mEq Oral TID    aluminum-magnesium hydroxide (MAALOX) oral suspension 15 mL  15 mL Oral QID PRN    albuterol (PROVENTIL HFA, VENTOLIN HFA, PROAIR HFA) inhaler 2 Puff  2 Puff Inhalation Q6H RT    fluticasone-vilanterol (BREO ELLIPTA) 200mcg-25mcg/puff  1 Puff Inhalation DAILY    bumetanide (BUMEX) injection 2 mg  2 mg IntraVENous DAILY    aspirin chewable tablet 81 mg  81 mg Oral DAILY    atorvastatin (LIPITOR) tablet 40 mg  40 mg Oral QHS    calcium carbonate (OS-YEFRI) tablet 500 mg [elemental]  500 mg Oral DAILY    cholecalciferol (VITAMIN D3) (400 Units /10 mcg) tablet 1 Tab  400 Units Oral DAILY    clonazePAM (KlonoPIN) tablet 0.5 mg  0.5 mg Oral QHS    cyclobenzaprine (FLEXERIL) tablet 10 mg  10 mg Oral TID PRN  [Held by provider] dabigatran etexilate (PRADAXA) capsule 150 mg  150 mg Oral BID    diphenhydrAMINE (BENADRYL) capsule 25 mg  25 mg Oral BID PRN    hydrALAZINE (APRESOLINE) tablet 25 mg  25 mg Oral BID    isosorbide mononitrate ER (IMDUR) tablet 60 mg  60 mg Oral DAILY    lisinopriL (PRINIVIL, ZESTRIL) tablet 20 mg  20 mg Oral DAILY    metoprolol succinate (TOPROL-XL) XL tablet 100 mg  100 mg Oral DAILY    pantoprazole (PROTONIX) tablet 40 mg  40 mg Oral ACB    pregabalin (LYRICA) capsule 300 mg  300 mg Oral BID    traMADoL (ULTRAM) tablet 50 mg  50 mg Oral DAILY PRN    venlafaxine-SR (EFFEXOR-XR) capsule 150 mg  150 mg Oral DAILY WITH BREAKFAST    sodium chloride (NS) flush 5-40 mL  5-40 mL IntraVENous Q8H    sodium chloride (NS) flush 5-40 mL  5-40 mL IntraVENous PRN    acetaminophen (TYLENOL) tablet 650 mg  650 mg Oral Q6H PRN    polyethylene glycol (MIRALAX) packet 17 g  17 g Oral DAILY PRN    promethazine (PHENERGAN) tablet 12.5 mg  12.5 mg Oral Q6H PRN    Or    ondansetron (ZOFRAN) injection 4 mg  4 mg IntraVENous Q6H PRN    insulin lispro (HUMALOG) injection   SubCUTAneous AC&HS    glucose chewable tablet 16 g  4 Tab Oral PRN    dextrose (D50W) injection syrg 12.5-25 g  12.5-25 g IntraVENous PRN    glucagon (GLUCAGEN) injection 1 mg  1 mg IntraMUSCular PRN    cephALEXin (KEFLEX) capsule 500 mg  500 mg Oral QID        Carolyn Hubbard MD

## 2020-12-17 NOTE — PROGRESS NOTES
End of Shift Note Bedside shift change report given to Edwin (oncoming nurse) by Heidi Hussein RN (offgoing nurse). Report included the following information SBAR, Kardex, Intake/Output and MAR Shift worked:  7p-7a Shift summary and any significant changes:  
  Patient was stable during shift. Pt received night time meds and c pap. Patient slept throughout the night until labs were collected. Concerns for physician to address:  none Zone phone for oncoming shift:   none Activity: 
Activity Level: Bed Rest 
Number times ambulated in hallways past shift: 0 Number of times OOB to chair past shift: 0 Cardiac:  
Cardiac Monitoring: No     
Cardiac Rhythm: Paced Access:  
Current line(s): PIV Genitourinary:  
Urinary status: external catheter Respiratory:  
O2 Device: Room air Chronic home O2 use?: NO Incentive spirometer at bedside: NO 
  
GI: 
Last Bowel Movement Date: 12/11/20 Current diet:  DIET DIABETIC CONSISTENT CARB Regular; FR 1000ML Passing flatus: NO Tolerating current diet: YES Pain Management:  
Patient states pain is manageable on current regimen: YES Skin: 
Preet Score: 14 Interventions: turn team, float heels, internal/external urinary devices and nutritional support Patient Safety: 
Fall Score: Total Score: 2 Interventions: bed/chair alarm, gripper socks and pt to call before getting OOB Length of Stay: 
Expected LOS: - - - Actual LOS: 4 Heidi Hussein RN

## 2020-12-17 NOTE — PROGRESS NOTES
I reviewed pertinent labs and imaging, and discussed /agreed on the plan of care with Dr. Remberto Del Valle      Hospitalist Progress Note    NAME: Anna Oconnell   :  1950   MRN:  [id number]       Assessment / Plan:  Hypokalemia - resolved   - improved from 2.9 to 3.5  - increase Potassium  - monitor in am     Worsening Back Pain with LE Weakness  Spinal Stenosis   ·    Back brace brought in by  , pt unable to transfer with brace  · Crying out in pain   · CT spine/lumbar  - \"Acute superior endplate fracture of L1 with Route 15% height loss. Multilevel degenerative changes with bilateral neural foraminal narrowing at the L4-5 and L5-S1 levels. \"  · Appreciate Neurosurgery input - not a surgical candidate, consider with IR kyphoplasty when respiratory status will allow for patient to lie flat  · Continue tramadol and cyclobenzaprine for pain added Norco   · Cont to  hold Pradaxa in anticipation of kyphoplasty in next 48 hours place on full AC with Lovenox 120 mg Q 12  CHADS score 2      Acute on Chronic Respiratory Failure with Hypoxia related to Acute on Chronic Systolic and Diastolic Congestive Heart Failure O2 82%, Temp 100.6 on admission  · Rapid and PCR COVID results NEGATIVE  · CXR  - Without acute infiltrate or congestion   · CTA Chest - \"Small bilateral pleural effusions with overlying atelectasis. No pulmonary embolus or other acute cardiopulmonary process. \"    · ECHO 2020 - EF 35-40%. Dilated left ventricle. Mild concentric hypertrophy. Severe systolic function. Left ventricular diastolic function.   · Appreciate  Cardiology - no plans for any invasive cardiac work up ,not grossly overloaded   · O2 weaned to room air sat 92-94%  · Switch  bumex to Po in am   · Continue BB, imdur and lisinopril   · Strict I&Os  Daily Weights  · 1L Fluid Restriction  · Appreciate Cardiology input     Pulmonary Hypertension   Coronary Artery Disease s/p PCI   Ischemic Cardiomyopathy   S/p BiV ICD 2018  Chronic Atrial Fibrillation - V paced currently   Hyperlipidemia   Hypertension   · Continue ASA, atorvastatin, pradaxa, hydralazine, imdur, metoprolol, lisinopril  · -160 today ? Pain related ? · Monitor BP -  Restart amlodipine if needed      Acute on Chronic LLE Cellulitis Temp 100.6 on admission  Chronic Venous Stasis of BLE   History of Chronic Diabetic Ulcer of Left Foot - healed   · Reports chronic LE cellulitis x 5 years  · LLE with erythema and venous stasis ulcer   · Continue Cephalexin for cellulitis   · Erythema improving ,to warm to touch  dressing to ulcer   · Appreciate Wound care      Chronic Kidney Disease Stage III Baseline 1.5 - resolved   · Cr at baseline, Cr 1.38 today   · Follow BMP   · Avoid Nephrotoxic Medications      Uncontrolled Type II Diabetes with Diabetic Neuropathy   Morbid Obesity BMI 42  · SSI   · Continue pregabalin   · Hold PTA glimepiride   · Last HgbA1c 8.4 (8/2020) - now at 6.0   · BS less than 200     Anemia of Chronic Disease Stable, monitor   Asthma Continue albuterol inhaler, breo-ellipta   GERD Continue protonix   Anxiety/Depression Continue venlafaxine-SR, clonazepam           / Body mass index is 41.79 kg/m². Code status: Full  Prophylaxis: Pradaxa  Recommended Disposition: Home w/Family     Subjective:     Chief Complaint / Reason for Physician Visit  \"I hurt I cant walk  \". Discussed with RN events overnight. Pt seen this morning with  at bedside , wants back pain addressed discuss plans   Review of Systems:  Symptom Y/N Comments  Symptom Y/N Comments   Fever/Chills n   Chest Pain n    Poor Appetite n   Edema n    Cough n   Abdominal Pain n    Sputum n   Joint Pain n    SOB/STEEN n   Pruritis/Rash n    Nausea/vomit    Tolerating PT/OT     Diarrhea n   Tolerating Diet n    Constipation    Other       Could NOT obtain due to:      Objective:     VITALS:   Last 24hrs VS reviewed since prior progress note.  Most recent are:  Patient Vitals for the past 24 hrs: Temp Pulse Resp BP SpO2   12/16/20 2250     90 %   12/16/20 2228 98 °F (36.7 °C) 80 18 (!) 150/78 90 %   12/16/20 2013     90 %   12/16/20 1955 98.3 °F (36.8 °C) 80 18 128/70 90 %   12/16/20 1505 98.3 °F (36.8 °C) 80 18 133/70 94 %   12/16/20 1349     94 %       Intake/Output Summary (Last 24 hours) at 12/17/2020 2943  Last data filed at 12/16/2020 2250  Gross per 24 hour   Intake    Output 200 ml   Net -200 ml        I had a face to face encounter and independently examined this patient on 12/17/2020, as outlined below:  PHYSICAL EXAM:  General: Obese . Alert, cooperative, no acute distress    EENT:  EOMI. Anicteric sclerae. MMM  Resp:    no wheezing or rales. No accessory muscle use,    RA , CPAP at night   CV:  S1S2  Valentino Le 1+  edema  GI:  Soft, Non distended, Non tender. +Bowel sounds  Neurologic:  Alert and oriented X 3, normal speech,   Psych:   . Not anxious nor agitated  Skin:  No rashes. No jaundice venous stasis ulcers on L   leg , erythema receeding    Reviewed most current lab test results and cultures  YES  Reviewed most current radiology test results   YES  Review and summation of old records today    NO  Reviewed patient's current orders and MAR    YES  PMH/ reviewed - no change compared to H&P  ________________________________________________________________________  Care Plan discussed with:    Comments   Patient x    Family      RN x    Care Manager     Consultant                       x Multidiciplinary team rounds were held today with , nursing, pharmacist and clinical coordinator. Patient's plan of care was discussed; medications were reviewed and discharge planning was addressed.      ________________________________________________________________________  Total NON critical care TIME:  30  Minutes    Total CRITICAL CARE TIME Spent:   Minutes non procedure based      Comments   >50% of visit spent in counseling and coordination of care ________________________________________________________________________  [place] Dorcas Burkitt, NP     Procedures: see electronic medical records for all procedures/Xrays and details which were not copied into this note but were reviewed prior to creation of Plan. LABS:  I reviewed today's most current labs and imaging studies. Pertinent labs include:  Recent Labs     12/17/20  0608 12/15/20  0431   WBC 5.4 5.3   HGB 9.0* 8.9*   HCT 30.6* 31.0*    153     Recent Labs     12/17/20  0608 12/16/20  0453 12/15/20  0431    142 143   K 3.5 3.4* 2.9*   * 109* 109*   CO2 30 29 32   * 114* 108*   BUN 19 16 17   CREA 1.37* 1.40* 1.54*   CA 9.1 8.9 8.9   MG  --  2.0  --        Signed: Abigail A. Dorcas Burkitt, NP

## 2020-12-17 NOTE — PROGRESS NOTES
End of Shift Note    Bedside shift change report given to Edwin (oncoming nurse) by Phillip Tripp (offgoing nurse). Report included the following information SBAR, Kardex, Intake/Output and MAR    Shift worked:  7p-7a     Shift summary and any significant changes:     Pt stable through shift. Pt still with some pain and minimal movement. PRN Norco given. Labs drawn. Purwick in place. Concerns for physician to address:  Kyphoplasty? Zone phone for oncoming shift:   [phone]       Activity:  Activity Level: Bed Rest  Number times ambulated in hallways past shift: 0  Number of times OOB to chair past shift: 0    Cardiac:   Cardiac Monitoring: No      Cardiac Rhythm: Paced    Access:   Current line(s): PIV     Genitourinary:   Urinary status: voiding and external catheter    Respiratory:   O2 Device: Room air  Chronic home O2 use?: NO  Incentive spirometer at bedside: NO     GI:  Last Bowel Movement Date: 12/11/20  Current diet:  DIET DIABETIC CONSISTENT CARB Regular; FR 1000ML  Passing flatus: YES  Tolerating current diet: YES       Pain Management:   Patient states pain is manageable on current regimen: YES    Skin:  Preet Score: 14  Interventions: turn team, speciality bed, float heels, increase time out of bed, PT/OT consult and internal/external urinary devices    Patient Safety:  Fall Score:  Total Score: 2  Interventions: bed/chair alarm, assistive device (walker, cane, etc), gripper socks, pt to call before getting OOB and stay with me (per policy)       Length of Stay:  Expected LOS: - - -  Actual LOS: [address]

## 2020-12-18 LAB
ANION GAP SERPL CALC-SCNC: 5 MMOL/L (ref 5–15)
BNP SERPL-MCNC: 2724 PG/ML
BUN SERPL-MCNC: 24 MG/DL (ref 6–20)
BUN/CREAT SERPL: 18 (ref 12–20)
CALCIUM SERPL-MCNC: 9.1 MG/DL (ref 8.5–10.1)
CHLORIDE SERPL-SCNC: 109 MMOL/L (ref 97–108)
CO2 SERPL-SCNC: 26 MMOL/L (ref 21–32)
CREAT SERPL-MCNC: 1.37 MG/DL (ref 0.55–1.02)
ERYTHROCYTE [DISTWIDTH] IN BLOOD BY AUTOMATED COUNT: 19.3 % (ref 11.5–14.5)
GLUCOSE BLD STRIP.AUTO-MCNC: 135 MG/DL (ref 65–100)
GLUCOSE BLD STRIP.AUTO-MCNC: 182 MG/DL (ref 65–100)
GLUCOSE BLD STRIP.AUTO-MCNC: 193 MG/DL (ref 65–100)
GLUCOSE SERPL-MCNC: 144 MG/DL (ref 65–100)
HCT VFR BLD AUTO: 32.3 % (ref 35–47)
HGB BLD-MCNC: 9.4 G/DL (ref 11.5–16)
MCH RBC QN AUTO: 23.9 PG (ref 26–34)
MCHC RBC AUTO-ENTMCNC: 29.1 G/DL (ref 30–36.5)
MCV RBC AUTO: 82 FL (ref 80–99)
NRBC # BLD: 0.03 K/UL (ref 0–0.01)
NRBC BLD-RTO: 0.4 PER 100 WBC
PLATELET # BLD AUTO: 178 K/UL (ref 150–400)
PMV BLD AUTO: 12 FL (ref 8.9–12.9)
POTASSIUM SERPL-SCNC: 3.7 MMOL/L (ref 3.5–5.1)
RBC # BLD AUTO: 3.94 M/UL (ref 3.8–5.2)
SERVICE CMNT-IMP: ABNORMAL
SODIUM SERPL-SCNC: 140 MMOL/L (ref 136–145)
WBC # BLD AUTO: 6.9 K/UL (ref 3.6–11)

## 2020-12-18 PROCEDURE — 74011250636 HC RX REV CODE- 250/636: Performed by: NURSE PRACTITIONER

## 2020-12-18 PROCEDURE — 85027 COMPLETE CBC AUTOMATED: CPT

## 2020-12-18 PROCEDURE — 36415 COLL VENOUS BLD VENIPUNCTURE: CPT

## 2020-12-18 PROCEDURE — 82962 GLUCOSE BLOOD TEST: CPT

## 2020-12-18 PROCEDURE — 77030038269 HC DRN EXT URIN PURWCK BARD -A

## 2020-12-18 PROCEDURE — 74011250637 HC RX REV CODE- 250/637: Performed by: STUDENT IN AN ORGANIZED HEALTH CARE EDUCATION/TRAINING PROGRAM

## 2020-12-18 PROCEDURE — 74011250637 HC RX REV CODE- 250/637: Performed by: INTERNAL MEDICINE

## 2020-12-18 PROCEDURE — 94640 AIRWAY INHALATION TREATMENT: CPT

## 2020-12-18 PROCEDURE — 2709999900 HC NON-CHARGEABLE SUPPLY

## 2020-12-18 PROCEDURE — 80048 BASIC METABOLIC PNL TOTAL CA: CPT

## 2020-12-18 PROCEDURE — 65270000029 HC RM PRIVATE

## 2020-12-18 PROCEDURE — 74011636637 HC RX REV CODE- 636/637: Performed by: STUDENT IN AN ORGANIZED HEALTH CARE EDUCATION/TRAINING PROGRAM

## 2020-12-18 PROCEDURE — 77030018831 HC SOL IRR H20 BAXT -A

## 2020-12-18 PROCEDURE — 74011250637 HC RX REV CODE- 250/637: Performed by: NURSE PRACTITIONER

## 2020-12-18 RX ORDER — ALBUTEROL SULFATE 0.83 MG/ML
2.5 SOLUTION RESPIRATORY (INHALATION)
Status: DISCONTINUED | OUTPATIENT
Start: 2020-12-18 | End: 2020-12-24 | Stop reason: HOSPADM

## 2020-12-18 RX ORDER — ALBUTEROL SULFATE 90 UG/1
2 AEROSOL, METERED RESPIRATORY (INHALATION)
Status: DISCONTINUED | OUTPATIENT
Start: 2020-12-18 | End: 2020-12-18

## 2020-12-18 RX ADMIN — ALBUTEROL SULFATE 2 PUFF: 90 AEROSOL, METERED RESPIRATORY (INHALATION) at 09:06

## 2020-12-18 RX ADMIN — HYDRALAZINE HYDROCHLORIDE 50 MG: 50 TABLET, FILM COATED ORAL at 11:10

## 2020-12-18 RX ADMIN — PREGABALIN 300 MG: 150 CAPSULE ORAL at 17:48

## 2020-12-18 RX ADMIN — POTASSIUM CHLORIDE 20 MEQ: 20 TABLET, EXTENDED RELEASE ORAL at 17:49

## 2020-12-18 RX ADMIN — ENOXAPARIN SODIUM 120 MG: 120 INJECTION SUBCUTANEOUS at 22:55

## 2020-12-18 RX ADMIN — ACETAMINOPHEN 650 MG: 325 TABLET ORAL at 13:42

## 2020-12-18 RX ADMIN — Medication 10 ML: at 21:43

## 2020-12-18 RX ADMIN — LISINOPRIL 20 MG: 20 TABLET ORAL at 11:10

## 2020-12-18 RX ADMIN — Medication 1 TABLET: at 11:10

## 2020-12-18 RX ADMIN — CEPHALEXIN 500 MG: 250 CAPSULE ORAL at 11:10

## 2020-12-18 RX ADMIN — CALCIUM 500 MG: 500 TABLET ORAL at 11:10

## 2020-12-18 RX ADMIN — POTASSIUM CHLORIDE 20 MEQ: 20 TABLET, EXTENDED RELEASE ORAL at 11:10

## 2020-12-18 RX ADMIN — METOPROLOL SUCCINATE 100 MG: 50 TABLET, EXTENDED RELEASE ORAL at 11:10

## 2020-12-18 RX ADMIN — VENLAFAXINE HYDROCHLORIDE 150 MG: 150 CAPSULE, EXTENDED RELEASE ORAL at 11:10

## 2020-12-18 RX ADMIN — FLUTICASONE FUROATE AND VILANTEROL TRIFENATATE 1 PUFF: 200; 25 POWDER RESPIRATORY (INHALATION) at 09:07

## 2020-12-18 RX ADMIN — PANTOPRAZOLE SODIUM 40 MG: 40 TABLET, DELAYED RELEASE ORAL at 11:10

## 2020-12-18 RX ADMIN — CEPHALEXIN 500 MG: 250 CAPSULE ORAL at 17:49

## 2020-12-18 RX ADMIN — HYDRALAZINE HYDROCHLORIDE 50 MG: 50 TABLET, FILM COATED ORAL at 17:49

## 2020-12-18 RX ADMIN — ATORVASTATIN CALCIUM 40 MG: 40 TABLET, FILM COATED ORAL at 21:43

## 2020-12-18 RX ADMIN — BUMETANIDE 2 MG: 1 TABLET ORAL at 11:09

## 2020-12-18 RX ADMIN — CLONAZEPAM 0.5 MG: 0.5 TABLET ORAL at 21:42

## 2020-12-18 RX ADMIN — ENOXAPARIN SODIUM 120 MG: 120 INJECTION SUBCUTANEOUS at 11:11

## 2020-12-18 RX ADMIN — ASPIRIN 81 MG CHEWABLE TABLET 81 MG: 81 TABLET CHEWABLE at 11:10

## 2020-12-18 RX ADMIN — PREGABALIN 300 MG: 150 CAPSULE ORAL at 11:10

## 2020-12-18 RX ADMIN — Medication 10 ML: at 17:54

## 2020-12-18 RX ADMIN — POTASSIUM CHLORIDE 20 MEQ: 20 TABLET, EXTENDED RELEASE ORAL at 21:42

## 2020-12-18 RX ADMIN — INSULIN LISPRO 2 UNITS: 100 INJECTION, SOLUTION INTRAVENOUS; SUBCUTANEOUS at 12:48

## 2020-12-18 RX ADMIN — ISOSORBIDE MONONITRATE 60 MG: 30 TABLET, EXTENDED RELEASE ORAL at 11:10

## 2020-12-18 NOTE — PROGRESS NOTES
Cardiology Progress Note  12/18/2020     Admit Date: 12/13/2020  Admit Diagnosis: Acute respiratory failure with hypoxemia (HCC) [J96.01]  CC: none currently  Cardiologist:  Dr Samuel Squires. Cardiac Assessment/Plan:    1) CAD (Prox LAD ISELA 4/2014 for NQWMI), Neg PET for ischemia 9/2018. Min CAD 2/2019.  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 Nestor Theodore was too expensive. *EF 15-20% 9/2018. EF 30% w/ minimal CAD at cath 2/2019. EF 20-25% (m-mod MR; mod TR) 8/2019. *EF 35-40% 8/2020 (admit for CHF: too much salt). 3) HTN,   4) AFIB: PAfib (RFA 4/2016 and 1/2017), Recurrent/persistent afib 2017/2018: AV node ablation 10/2018. *Chronic AC w/ pradaxa. 5) DM,   6) Dyslipidemia   7) CKD III: Aldactone stopped in 2014. Cr 2 11/2018 (after becky): Cr 1.5 12/2018 & 8/2019; Cr 1.4/gfr 39 12/2020 (Dr. Clay Arreola). 8) St Don PPM 7/2014 for bradycardia while on therapy for AFib: changed to BiV ICD 10/2018.  9) nephrolithiasis  10) SHAYLA (on CPAP),   11) cholecystectomy 11/2018.  12) LE venous insufficiency:  B SFV ablation 3/2020. Continues to use compression stockings/wraps. Obesity: 240# 2014; 262# 9/2019. 270 to 281# 2020. 279# 8/2020; 281# 12/2020.     Imp 12/14: stable cardiac status; not grossly overloaded;   Lymphedema compression pumps are pending as noted below. No plan for invasive cardiac testing; Dispo per primary team.      12/17: BPs 120-150; HR 80; 94% RA. No wt yesterday nor today. No Is recorded. Hg 9; Cr 1.37; K 3.5. Cardiac meds; asa 81; lipitor 40; bumex 2 IV qday; hydralazine 25 bid; Imdur 60; lisinopril 20; toprol ; (off norvasc). Pradaxa (150 bid) is on hold for possible IR Rx of back. Pradaxa effect should be fully gone in 5 days; If she's going to remain off pradaxa for now, full dose lovenox should be used until told to stop by IR (typically 12 hrs before procedure).   This was d/w primary team yesterday & she is on lovenox 120 bid.    Will change bumex to PO starting tomorrow. Ortho/dispo per primary team.  Hydralazine to 50 bid. 12/18: BPs 120-140s yesterday; HR 70-80s; 92% RA. No wt nor I/Os. Hg 9.4; Cr 1.37  Cardiac meds; asa 81; lipitor 40; bumex 2 PO qday; hydralazine 50 bid; Imdur 60; lisinopril 20; toprol ; (to stay off norvasc in case contributing to LE edema). Lovneox 120 bid. No new cardiac recs; stable cardiac status. Restart pradaxa when appropriate. Dispo/ortho per primary team.    No active cardiac issues; will sign-off; Please call if questions/issues. Routine f/u with me after d/c. Thanks. ____________________________________________________________________  Yousif Pablo is a 78 y.o. female presents with Acute respiratory failure with hypoxemia ([facility] [address] [J96.01].      As noted in H&P: \"PRESENT ILLNESS:     Nitza Luis is a 78 y.o.   female with past medical history significant for CHF, SHAYLA on CPAP, and spinal stenosis who presents with worsening of lower back pain over the last 3 weeks.  Patient has chronic lower back pain that is on and off for years.  She follows up with an orthopedic doctor and receives steroid injections almost every 2 months. Babak Davis last dose was in October.  Patient gets some relief with the steroid injection but the pain often recurs.  Over the last 3 weeks, her lower back pain has gotten worse to the extent she was unable to walk or get out of bed.  The pain has been persistent, 10/10, and occasionally radiates to the right leg.  Worse with any kind of movement, no relieving factor.  She has tried different analgesics and muscle relaxants with no significant improvement.  She denies subjective fever, bowel/bladder incontinence, or lower extremity weakness.  Patient's  called EMS for worsening back pain.  Per EMS patient was febrile with a temp of 100.7.  No record of fever in the ED.  In the ED patient was noted to have a sat of 82% on room air.  Patient states she has chronic shortness of breath and gets winded even with walking short distance.  She follows up with her pulmonologist and she was recently prescribed a pulse oximeter to monitor her oxygen but she has not gotten it yet. Codie Paul denies recent worsening of shortness of breath, cough, chest pain, myalgia, or fatigue.  She does have contact history with her son-in-law who has COVID infection.     Of note, patient also has left lower leg erythema and ulcer which started as a blister about a week ago. She was prescribed topical ointment/?antibiotics but hasn't seen any improvement. \"     ______________________________________________________________________     The patient reports no chest pain/pressure; no change in chronic dyspnea; no change in LE edema: plan for compression Rx as noted below.     No PND, orthopnea, palpitations, pre-syncope, syncope, peripheral edema, or decrease in exercise tolerance. No current complaints.     ECG: Afib; Vpacint. Tele: none  CXR: \"No acute findings. \" NO CHF. Notable labs: Neg COVID; Hg 9.1; Cr 1.38 from 1.57 from 1.4-1.5 range. Nl troponin x1; proBNP 1.8k  Nl TSH & LDL 53 8/2020.   _______________________________________________________________  Notable prior cardiac history:  @ OV 10/10/20:  Ms Silvestre Caceres has a h/o:  1) CAD (Prox LAD ISELA 4/2014 for NQWMI), Neg PET for ischemia 9/2018. Min CAD 2/2019.  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 Susan Haskins was too expensive. *EF 15-20% 9/2018. EF 30% w/ minimal CAD at cath 2/2019. EF 20-25% (m-mod MR; mod TR) 8/2019. *EF 35-40% 8/2020 (admit for CHF: too much salt). 3) HTN,   4) AFIB: PAfib (RFA 4/2016 and 1/2017), Recurrent/persistent afib 2017/2018: AV node ablation 10/2018. *Chronic AC w/ pradaxa. 5) DM,   6) Dyslipidemia   7) CKD III: Aldactone stopped in 2014. Cr 2 11/2018 (after becky): Cr 1.5 12/2018 & 8/2019; Cr 1.4/gfr 39 12/2020 (Dr. Nuha Spear).   8) St Don PPM 7/2014 for bradycardia while on therapy for AFib: changed to BiV ICD 10/2018.  9) nephrolithiasis  10) SHAYLA (on CPAP),   11) cholecystectomy 11/2018.  12) LE venous insufficiency:  B SFV ablation 3/2020. Continues to use compression stockings/wraps. Obesity: 240# 2014; 262# 9/2019. 270 to 281# 2020. 279# 8/2020; 281# 12/2020.     7/2020:  No chest pain nor change in dyspnea. Using CPAP. Had palpitations x1 since last visit. No falling or bleeding.     8/2020:  Rashmi Hernandez last week for CHF; too much sodium. Since discharge, no chest pain nor palpitations. No change in dyspnea; less LE edema overall, (never fully resolves). Occasional lightheaded if she gets up too quickly. Complains of continued fatigue but does not wish home PT.     12/2020:  No chest pain; decreasing dyspnea. More prominent LE edema/erythema; sees Dr. Chandni Blackmon later this week.     Patient presents today for 6mo follow up. Continues to have swelling and redness. Reported that she has been doing leg exercises. Patient could not but the compression stockings due to constant fluid leak from left lower extremity. Patient has tried conservative management with compression stocking, exercises and leg elevation for last 6 months. Subsequently patient underwent bilateral GSV ablation. She did not find much improvement in her symptoms. Now patient is having hyperkeratosis of the skin on anterior aspect of the leg, oozing of fluid, she has stage III leg edema     IMPRESSION AND PLAN  01. Lymphedema, not elsewhere classified (I89.0):  Patient has been having chronic edema bilateral lower extremity. She has been following the conservative management with compression stocking, exercises and leg elevation for last 6 months. Subsequently she underwent bilateral GSV ablation. But did not help much with her swelling. Now she is having hyperkeratosis of the skin in the anterior aspect of the legs, chronic wounds, constant oozing of fluid.   Part of her leg edema is because of lymphedema. At this point I think lymphedema compression pumps will help with her swelling. 02. Chronic venous insufficiency (I87.2): She has bilateral lower extremity edema also has signs of chronic venous stasis with discoloration of anterior aspect of legs. She also has congestive heart failure and chronic kidney disease. Her etiology for leg swelling is multifactorial.  She has undergone bilateral GSV RF ablation. She has not seen much improvement yet. I could part of it is because of her lymphedema. Will prescribe her lymphedema compression pumps. 03. Atherosclerotic heart disease of native coronary artery without angina pectoris (I25.10): Minimal CAD @ cath 2/2019.     We discussed the signs and symptoms of unstable angina, myocardial infarction and malignant arrhythmia. The patient knows to seek immediate medical attention should they occur. 04. Dilated cardiomyopathy (I42.0):  Mixed ischemic/non-ischemic (tachycardia mediated) CM previously. Her EF improved but then has worsened again as noted above. EF 30% 2/2019. The patient has an ICD in place. 05. Chronic combined systolic (congestive) and diastolic (congestive) heart failure (I50.42): The patient is compliant with their CHF regimen. is tolerating the current beta-blocker dose. 06. Longstanding persistent atrial fibrillation (I48.11): As per Dr. Pearl Powers, off amiodarone & now s/p AV node ablation. She remains on anticoagulation. 07. Hyp hrt & chr kdny dis w hrt fail and stg 1-4/unsp chr kdny (I13.0):  Finally controlled with addition of Imdur. 08. Mixed hyperlipidemia (E78.2):  Lipid labs drawn by PCP. The patient is tolerating lipid lowering therapy well. 09. Chronic kidney disease, stage 3 (moderate) (N18.3):  Cr 1.24/GFR46 11/2015. Cr 1.32/GFR 43 1/2017. Cr 2 11/2018 after becky. Creatinine 1.5 12/2018. 10. Localized edema (R60.0):  Chronic. She avoids salt. Will evaluate for venous insufficiency.    11. Presence of automatic (implantable) cardiac defibrillator (Z95.810): This is a biventricular device. will continue to be followed in device clinic. 12. Type 2 diabetes mellitus with other specified complication (S80.18):  Lipids & HTN as noted above; DM managed by other MD.   13. Long term (current) use of aspirin (Z79.82): This condition is stable. 15. Long term (current) use of anticoagulants (Z79.01): This condition is stable. Indicated for atrial fibrillation. No bleeding. If she has recurrent falls, she knows to call for re-evaluation of anticoagulation.    15. Body mass index [BMI]40.0-44.9, adult (Z68.41)      ORDERS:  1 Dietary management education, guidance, and counseling     2 Return office visit with Barbara Licea MD in 6 Months.           12/10/10 MEDICATION LIST  Medication Sig Desc   acetaminophen 500 mg capsule take 2 capsule by oral route  every 6 hours as needed   albuterol sulfate HFA 90 mcg/actuation aerosol inhaler inhale 2 puff by inhalation route  every 4 - 6 hours as needed   amlodipine 10 mg tablet take 1 tablet by oral route  every day   aspirin 81 mg tablet,delayed release take 1 tablet by oral route  every day   ATORVASTATIN CALCIUM 40 MG Tablet TAKE 1 TABLET EVERY EVENING   Ayr Saline 0.65 % nasal spray aerosol     bumetanide 2 mg tablet take 1 tablet by oral route  every day   Claritin 10 mg tablet take 1 tablet by oral route  every day   clonazepam 0.5 mg tablet take 1 tablet by oral route  every day   clotrimazole-betamethasone 1 %-0.05 % topical cream apply by topical route 2 times every day for 2 weeks to the affected and surrounding areas of skin in the morning and evening   cyclobenzaprine 10 mg tablet take 1 tablet by oral route 2 times every day   glimepiride 2 mg tablet take 1 tablet by oral route  every day   hydralazine 25 mg tablet take 1 tablet by oral route 2 times every day with food   isosorbide mononitrate ER 30 mg tablet,extended release 24 hr take 1 tablet by oral route  twice a day   lidocaine 4 % topical cream     lisinopril 20 mg tablet take 1 tablet by oral route  every day   Lyrica 200 mg capsule take 1 capsule by oral route 2 times every day   magnesium 400 mg (as magnesium oxide) tablet take 1 tablet by oral route  every day   metoprolol succinate  mg tablet,extended release 24 hr TAKE 1 TABLET EVERY DAY   nystatin 100,000 unit/gram topical cream apply by topical route 2 times every day to the affected area(s) as needed   omeprazole 40 mg capsule,delayed release take 1 capsule by oral route  every day before a meal   potassium chloride ER 10 mEq capsule,extended release take 1 Capsule by oral route 3 times daily   Pradaxa 150 mg capsule take 1 capsule by oral route 2 times every day   Premarin 0.625 mg/gram vaginal cream insert 0.5 applicatorful by vaginal route  every day cyclically, 3 weeks on and 1 week off   Symbicort 160 mcg-4.5 mcg/actuation HFA aerosol inhaler inhale 2 puff by inhalation route 2 times every day in the morning and evening   Ultram 50 mg tablet take 1 tablet by oral route  every 6 hours as needed   venlafaxine  mg capsule,extended release 24 hr take 1 capsule by oral route 2 times every day   Vitamin D3 1,000 unit capsule take 1 daily         CARDIAC HISTORY  CAD:  1 NSTEMI [95% Proximal LAD, Resolute (ISELA) to the Proximal LAD.] - 4/8/2014      CHF/CM:  1 Mixed ischemic/tachycardic CM. [Nl TSH.] - 4/2014   2 Ischemic & tachycardic Cardiomyopathy [Echo (EF 0.45) - 06/17/2014, (prev EF 20-30%)] - 6/2014   3 Progressive Cardiomyopathy [Cardiac PET (EF .16) - 09/24/2018, No ischemia noted. EF improved but not normalized; no CAD cath 2/2019.] - 9/2018      ARRHYTHMIA:  1 A Fib [DCC, Plan: amio started; Eliquis with Effient (change eliquis to asa if NSR after 30 days). ] - 4/8/2014   2 PAF - 8/2010   3 A Fib, recurrent; and 6/2014. [Had marked sinus bradycardia while on bblocker/dig.] - 5/2014   4 Sinus Bradycardia.  - 6/2/2014   5 PPM (Devices (Dual Chamber PPM (Gerlene Cava)) - 7/17/2014) - 7/17/2014   6 PAF [CVR; Eliquis restarted (plavix stopped). ] - 9/2015   7 A Fib [RFA] - 4/2016   8 A Fib [RFA] - 1/2017   9 Chronic A Fib [PPM to BiV device, then AV node ablation. Amiodarone stopped. ] - 10/2018      RISK FACTORS:  1 Diabetes [Diagnosed in 2014]   2 Hypertension   3 Dyslipidemia   4 Tobacco Use: No/never         CARDIOVASCULAR PROCEDURES  Procedure Date Results   Cardiac PET 09/24/2018 EF 0.16 (16%), physiologic apical thinning with no ischemia   Cath 02/22/2019 Minimal CAD   Cath 04/08/2014 95% Proximal LAD, Resolute (ISELA) to the Proximal LAD. DCCV 04/08/2014 Initial Rhythm A Fib, Final Rhythm Sinus   Devices 02/08/2019 Bi-Ventricular ICD (St. Don), 100% Afib. BIV pacing >99% (prior AV node ablation)   Devices 07/17/2014 Dual Chamber PPM (St. [id number])   Echo 08/11/2020 LVE; EF 35-40%; normal RV.  minimal AS (mean 9). PAp 43; at [address] Echo 08/17/2019 EF 20-25%; mild-to-moderate MR. Moderate TR. Low normal RV function. At [address] Echo 02/06/2019 EF 0.30 (30%), Mild MR, Mild TR, Mild LVH, Aortic Sclerosis, LA Diam 3.9 cm, Est RVSP 40 mmHg, global hypokinesis, worse in the inferior wall & apex. Normal RV. Echo 10/08/2018 EF 0.15 (15%), Aortic Sclerosis, Mild MR, Est RVSP 23 mmHg, EF 15-20%; normal RV. At [address] Echo 11/28/2017 EF 0.45 (45%), Mild Global Hypo, Mild TR, Mild LVH, Mild MR, Est RVSP 41 mmHg, Normal RV. (probable trileaflet AoV). Echo 10/30/2015 EF 0.45 (45%), Mild LVH, LA Diam 4.1 cm, Est RVSP 35 mmHg, Normal RV. ?bicuspid AoV; (Prob trileaflet on previous ANGELITO). Echo 06/17/2014 EF 0.45 (45%), EF range 45%-50%, Mild LV dilatation. Mild LV systolic dysfunction. ? Bicuspid AoV but prob trileafet on previous ANGELITO. Echo 04/03/2014 EF 0.20 (20%), global HK with lateral sparing; no valve disease.    EKG 12/07/2020 Atrial Fib, Paced Rhythm   Holter 06/09/2014 No Malignant Arrhythmias, Atrial Fib, No correlation with symptoms, (, [address]   Holter 04/30/2014 No Malignant Arrhythmias, Atrial Fib, No correlation with symptoms, \"light or heavy chest\" = afib in 60s. HR  (ave 63). ASx pauses (upto 2.8) while asleep. RFA 01/19/2017 Indication A Fib, Final Rhythm Sinus   RFA   Indication A Fib   Sleep Study   OSAS: Moderate   ANGELITO 04/08/2014 EF 0.35 (35%), No BRAYDON Clot, prob trileaflet AoV. Venous Duplex 04/16/2020 Left:  1. Post venous procedure duplex exam shows no evidence of thrombus in the saphenofemoral junction. 2. Left mid calf positive  5mm in diameter and 2.8s in reflux.     Right:  Right distal calf positive  4.1mm in diameter and .53s in reflux. Venous Duplex 03/27/2020 Post venous ablation duplex exam shows no evidence of heat induced thrombus in the right common femoral vein. Venous Duplex 02/19/2020 Exam was conducted with patient in a high reverse-trendelenburg position. The (right/left/bilateral) great saphenous vein is incompetent (above/below the knee) (throughout the lower extremity) with retrograde flow >0.5seconds.        For other plans, see orders.   Hospital problem list     Active Hospital Problems    Diagnosis Date Noted    Obesity (BMI 30-39.9) 04/30/2019     Priority: 1 - One    Ischemic cardiomyopathy 11/13/2018     Priority: 1 - One    Stage 3 chronic kidney disease 11/13/2018     Priority: 1 - One    Hypercholesterolemia 01/22/2018     Priority: 1 - One    Lower back pain 12/13/2020    Hypoxia 31/99/6872    Systolic CHF, chronic ([address] 06/05/2016    Chronic atrial fibrillation ([address] 06/05/2016    Type 2 diabetes mellitus with diabetic neuropathy, without long-term current use of insulin ([address] 27/39/4821    Acute systolic heart failure ([address] 04/04/2014        Subjective: Agueda Ashton reports       Chest pain X none  consistent with:  Non-cardiac CP         Atypical CP     None now  On going  Anginal CP     Dyspnea X none  at rest  with exertion improved  unchanged  worse              PND X none  overnight       Orthopnea X none  improved  unchanged  worse   Presyncope X none  improved  unchanged  worse     Ambulated in hallway without symptoms   Yes   Ambulated in room without symptoms  Yes   Objective:     Physical Exam:  Overall VSSAF;    Visit Vitals  BP (!) 125/59 (BP 1 Location: Left arm, BP Patient Position: At rest)   Pulse 71   Temp 97.6 °F (36.4 °C)   Resp 18   Ht 5' 9\" (1.753 m)   Wt 128.4 kg (283 lb)   SpO2 92%   BMI 41.79 kg/m²     Temp (24hrs), Av °F (36.7 °C), Min:97.6 °F (36.4 °C), Max:98.3 °F (36.8 °C)    No data found. No data found. No data found. No intake or output data in the 24 hours ending 20 0756  General Appearance: Well developed, obese, no acute distress. Ears/Nose/Mouth/Throat:   Normal MM; anicteric. JVP: WNL   Resp:   Lungs clear to auscultation bilaterally. Nl resp effort. Cardiovascular:  IRRR, S1, S2 normal, no new murmur. No gallop or rub. Abdomen:   Soft, non-tender, bowel sounds are present. Extremities: trivial edema bilaterally. Chronic stasis. Skin:  Neuro: Warm and dry. A/O x3, grossly nonfocal       cath site intact w/o hematoma or new bruit; distal pulse unchanged  Yes   Data Review:     Telemetry none  sinus  chronic afib  parox afib  NSVT     ECG independently reviewed  NSR  afib  no significant changes  NSST-Tw chgs     x no new ECG provided for review   Lab results reviewed as noted below. Current medications reviewed as noted below. No results for input(s): PH, PCO2, PO2 in the last 72 hours. No results for input(s): CPK, CKMB, CKNDX, TROIQ in the last 72 hours.   Recent Labs     20  0403 20  0608 20  0453    142 142   K 3.7 3.5 3.4*   * 109* 109*   CO2 26 30 29   BUN 24* 19 16   CREA 1.37* 1.37* 1.40*   GFRAA 46* 46* 45*   * 111* 114*   CA 9.1 9.1 8.9   WBC 6.9 5.4  --    HGB 9.4* 9.0*  --    HCT 32.3* 30.6*  --     155  --      Lab Results   Component Value Date/Time    Cholesterol, total 123 08/10/2020 03:55 AM    HDL Cholesterol 56 08/10/2020 03:55 AM    LDL, calculated 53 08/10/2020 03:55 AM    Triglyceride 70 08/10/2020 03:55 AM    CHOL/HDL Ratio 2.2 08/10/2020 03:55 AM     No results for input(s): AP, TBIL, TP, ALB, GLOB, GGT, AML, LPSE in the last 72 hours. No lab exists for component: SGOT, GPT, AMYP, HLPSE  No results for input(s): INR, PTP, APTT, INREXT, INREXT in the last 72 hours.    No components found for: GLPOC    Current Facility-Administered Medications   Medication Dose Route Frequency    hydrALAZINE (APRESOLINE) tablet 50 mg  50 mg Oral BID    bumetanide (BUMEX) tablet 2 mg  2 mg Oral DAILY    HYDROcodone-acetaminophen (NORCO) 5-325 mg per tablet 1 Tab  1 Tab Oral Q6H PRN    enoxaparin (LOVENOX) injection 120 mg  120 mg SubCUTAneous Q12H    potassium chloride (K-DUR, KLOR-CON) SR tablet 20 mEq  20 mEq Oral TID    aluminum-magnesium hydroxide (MAALOX) oral suspension 15 mL  15 mL Oral QID PRN    albuterol (PROVENTIL HFA, VENTOLIN HFA, PROAIR HFA) inhaler 2 Puff  2 Puff Inhalation Q6H RT    fluticasone-vilanterol (BREO ELLIPTA) 200mcg-25mcg/puff  1 Puff Inhalation DAILY    aspirin chewable tablet 81 mg  81 mg Oral DAILY    atorvastatin (LIPITOR) tablet 40 mg  40 mg Oral QHS    calcium carbonate (OS-YEFRI) tablet 500 mg [elemental]  500 mg Oral DAILY    cholecalciferol (VITAMIN D3) (400 Units /10 mcg) tablet 1 Tab  400 Units Oral DAILY    clonazePAM (KlonoPIN) tablet 0.5 mg  0.5 mg Oral QHS    cyclobenzaprine (FLEXERIL) tablet 10 mg  10 mg Oral TID PRN    [Held by provider] dabigatran etexilate (PRADAXA) capsule 150 mg  150 mg Oral BID    diphenhydrAMINE (BENADRYL) capsule 25 mg  25 mg Oral BID PRN    isosorbide mononitrate ER (IMDUR) tablet 60 mg  60 mg Oral DAILY    lisinopriL (PRINIVIL, ZESTRIL) tablet 20 mg  20 mg Oral DAILY    metoprolol succinate (TOPROL-XL) XL tablet 100 mg  100 mg Oral DAILY    pantoprazole (PROTONIX) tablet 40 mg  40 mg Oral ACB    pregabalin (LYRICA) capsule 300 mg  300 mg Oral BID    traMADoL (ULTRAM) tablet 50 mg  50 mg Oral DAILY PRN    venlafaxine-SR (EFFEXOR-XR) capsule 150 mg  150 mg Oral DAILY WITH BREAKFAST    sodium chloride (NS) flush 5-40 mL  5-40 mL IntraVENous Q8H    sodium chloride (NS) flush 5-40 mL  5-40 mL IntraVENous PRN    acetaminophen (TYLENOL) tablet 650 mg  650 mg Oral Q6H PRN    polyethylene glycol (MIRALAX) packet 17 g  17 g Oral DAILY PRN    promethazine (PHENERGAN) tablet 12.5 mg  12.5 mg Oral Q6H PRN    Or    ondansetron (ZOFRAN) injection 4 mg  4 mg IntraVENous Q6H PRN    insulin lispro (HUMALOG) injection   SubCUTAneous AC&HS    glucose chewable tablet 16 g  4 Tab Oral PRN    dextrose (D50W) injection syrg 12.5-25 g  12.5-25 g IntraVENous PRN    glucagon (GLUCAGEN) injection 1 mg  1 mg IntraMUSCular PRN    cephALEXin (KEFLEX) capsule 500 mg  500 mg Oral QID        Maninder Turner MD

## 2020-12-18 NOTE — PROGRESS NOTES
End of Shift Note    Bedside shift change report given to Edwin (oncoming nurse) by Ruben Vasquez RN (offgoing nurse). Report included the following information SBAR, Kardex and MAR    Shift worked:  7p-7a     Shift summary and any significant changes:     Pt has been stable through shift, meds given according to mar, labs drawn, pt got up to use bedside commode     Concerns for physician to address:       Zone phone for oncoming shift:          Activity:  Activity Level: Bed Rest  Number times ambulated in hallways past shift: 0  Number of times OOB to chair past shift: 0    Cardiac:   Cardiac Monitoring: No      Cardiac Rhythm: Paced    Access:   Current line(s): PIV     Genitourinary:   Urinary status: voiding    Respiratory:   O2 Device: Room air  Chronic home O2 use?: NO  Incentive spirometer at bedside: YES     GI:  Last Bowel Movement Date: 12/11/20  Current diet:  DIET DIABETIC CONSISTENT CARB Regular; FR 1000ML  Passing flatus: YES  Tolerating current diet: YES       Pain Management:   Patient states pain is manageable on current regimen: YES    Skin:  Preet Score: 15  Interventions: turn team, speciality bed and increase time out of bed    Patient Safety:  Fall Score:  Total Score: 2  Interventions: bed/chair alarm, assistive device (walker, cane, etc), gripper socks and pt to call before getting OOB       Length of Stay:  Expected LOS: - - -  Actual LOS: Mikki Aguirre RN

## 2020-12-18 NOTE — PROGRESS NOTES
I reviewed pertinent labs and imaging, and discussed /agreed on the plan of care with Dr. Nette Duffy      Hospitalist Progress Note    NAME: Arturo Moseley   :  1950   MRN:  [id number]       Assessment / Plan:  Hypokalemia - resolved   - improved from 2.9 to 3.5  - increase Potassium  - monitor in am     Worsening Back Pain with LE Weakness  Spinal Stenosis   ·    Back brace brought in by  , pt unable to transfer with brace  · Crying out in pain  Attempting to work with PT   · Pain limiting pt ability to participate in PT . Enc pt to use pain medication PRN   ·   · CT spine/lumbar  - \"Acute superior endplate fracture of L1 with Route 15% height loss. Multilevel degenerative changes with bilateral neural foraminal narrowing at the L4-5 and L5-S1 levels. \"  · Appreciate Neurosurgery input - not a surgical candidate, cContinue tramadol and cyclobenzaprine for pain added Norco   · Cont to  hold Pradaxa in anticipation of kyphoplasty in on Monday   · Pt unable to get MRI  Due to pacemaker will order body scan   · 8 hours place on full AC with Lovenox 120 mg Q 12  CHADS score 2      Acute on Chronic Respiratory Failure with Hypoxia related to Acute on Chronic Systolic and Diastolic Congestive Heart Failure O2 82%, Temp 100.6 on admission  · Rapid and PCR COVID results NEGATIVE  · CXR  - Without acute infiltrate or congestion   · CTA Chest - \"Small bilateral pleural effusions with overlying atelectasis. No pulmonary embolus or other acute cardiopulmonary process. \"    · ECHO 2020 - EF 35-40%. Dilated left ventricle. Mild concentric hypertrophy. Severe systolic function. Left ventricular diastolic function.   · Appreciate  Cardiology - no plans for any invasive cardiac work up ,not grossly overloaded   · O2 weaned to room air sat 92-94%  · Cont  bumex Po in   · Continue BB, imdur and lisinopril   · Strict I&Os  Daily Weights  · 1L Fluid Restriction  · Appreciate Cardiology input     Pulmonary Hypertension Coronary Artery Disease s/p PCI   Ischemic Cardiomyopathy   S/p BiV ICD 2018  Chronic Atrial Fibrillation - V paced currently   Hyperlipidemia   Hypertension   · Continue ASA, atorvastatin, pradaxa, hydralazine, imdur, metoprolol, lisinopril  · -140 improved   · Monitor BP -       Acute on Chronic LLE Cellulitis Temp 100.6 on admission  Chronic Venous Stasis of BLE   History of Chronic Diabetic Ulcer of Left Foot - healed   · Reports chronic LE cellulitis x 5 years  · LLE with erythema and venous stasis ulcer   · Continue Cephalexin for cellulitis   · Erythema looked worse today but not warm , afebrile    · Appreciate Wound care -daily wound care      Chronic Kidney Disease Stage III Baseline 1.5 - resolved   · Cr at baseline, Cr 1.38 today   · Follow BMP   · Avoid Nephrotoxic Medications      Uncontrolled Type II Diabetes with Diabetic Neuropathy   Morbid Obesity BMI 42  · SSI   · Continue pregabalin    · Hold PTA glimepiride   · Last HgbA1c 8.4 (8/2020) - now at 6.0   · BS less than 200- controlled      Anemia of Chronic Disease Stable, monitor   Asthma Continue albuterol inhaler, breo-ellipta   GERD Continue protonix   Anxiety/Depression Continue venlafaxine-SR, clonazepam           / Body mass index is 41.79 kg/m². Code status: Full  Prophylaxis: Pradaxa  Recommended Disposition: Home w/Family     Subjective:     Chief Complaint / Reason for Physician Visit  \"I still hurt but it better   \". Discussed with RN events overnight.    Pt seen this morning bed , attempted PT with brace on , still painful but is trying     Review of Systems:  Symptom Y/N Comments  Symptom Y/N Comments   Fever/Chills n   Chest Pain n    Poor Appetite n   Edema n    Cough n   Abdominal Pain n    Sputum n   Joint Pain n    SOB/STEEN n   Pruritis/Rash n    Nausea/vomit    Tolerating PT/OT     Diarrhea n   Tolerating Diet n    Constipation    Other       Could NOT obtain due to:      Objective:     VITALS:   Last 24hrs VS reviewed since prior progress note. Most recent are:  Patient Vitals for the past 24 hrs:   Temp Pulse Resp BP SpO2   12/18/20 0907     93 %   12/17/20 2258 97.6 °F (36.4 °C) 71 18 (!) 125/59 92 %   12/17/20 1930 98.2 °F (36.8 °C) 85 18 (!) 149/86 93 %     No intake or output data in the 24 hours ending 12/18/20 1727     I had a face to face encounter and independently examined this patient on 12/18/2020, as outlined below:  PHYSICAL EXAM:  General: Obese . Alert, cooperative, no acute distress    EENT:  EOMI. Anicteric sclerae. MMM   Resp:    no wheezing or rales. No accessory muscle use,  RA , CPAP at night   CV:  S1S2  Valentino Le 1+  Edema nl cap refill   GI:  Soft, Non distended, Non tender. +Bowel sounds  Neurologic:  Alert and oriented X 3, normal speech,   Psych:   . Not anxious nor agitated, occasional tearful   Skin:  No rashes. No jaundice venous stasis ulcers on L leg , erythema receeding    Reviewed most current lab test results and cultures  YES  Reviewed most current radiology test results   YES  Review and summation of old records today    NO  Reviewed patient's current orders and MAR    YES  PMH/ reviewed - no change compared to H&P  ________________________________________________________________________  Care Plan discussed with:    Comments   Patient x    Family      RN x    Care Manager     Consultant                       x Multidiciplinary team rounds were held today with , nursing, pharmacist and clinical coordinator. Patient's plan of care was discussed; medications were reviewed and discharge planning was addressed. ________________________________________________________________________  Total NON critical care TIME:  30  Minutes    Total CRITICAL CARE TIME Spent:   Minutes non procedure based      Comments   >50% of visit spent in counseling and coordination of care     ________________________________________________________________________  Douglas Gonzalez.  Jorge Luis Sharpe NP Procedures: see electronic medical records for all procedures/Xrays and details which were not copied into this note but were reviewed prior to creation of Plan. LABS:  I reviewed today's most current labs and imaging studies. Pertinent labs include:  Recent Labs     12/18/20  0403 12/17/20  0608   WBC 6.9 5.4   HGB 9.4* 9.0*   HCT 32.3* 30.6*    155     Recent Labs     12/18/20  0403 12/17/20  0608 12/16/20  0453    142 142   K 3.7 3.5 3.4*   * 109* 109*   CO2 26 30 29   * 111* 114*   BUN 24* 19 16   CREA 1.37* 1.37* 1.40*   CA 9.1 9.1 8.9   MG  --   --  2.0       Signed: Ana Kenny, AMANDA

## 2020-12-18 NOTE — PROGRESS NOTES
Nuclear Med - Reviewed the chart. Patient having IR study on Monday 12/21/20. Ordered radioactive material for Bone scan.  Will start study on Monday morning  No prep

## 2020-12-19 LAB
ANION GAP SERPL CALC-SCNC: 3 MMOL/L (ref 5–15)
BUN SERPL-MCNC: 23 MG/DL (ref 6–20)
BUN/CREAT SERPL: 14 (ref 12–20)
CALCIUM SERPL-MCNC: 9.3 MG/DL (ref 8.5–10.1)
CHLORIDE SERPL-SCNC: 106 MMOL/L (ref 97–108)
CO2 SERPL-SCNC: 30 MMOL/L (ref 21–32)
CREAT SERPL-MCNC: 1.6 MG/DL (ref 0.55–1.02)
ERYTHROCYTE [DISTWIDTH] IN BLOOD BY AUTOMATED COUNT: 19.5 % (ref 11.5–14.5)
GLUCOSE BLD STRIP.AUTO-MCNC: 130 MG/DL (ref 65–100)
GLUCOSE BLD STRIP.AUTO-MCNC: 141 MG/DL (ref 65–100)
GLUCOSE BLD STRIP.AUTO-MCNC: 143 MG/DL (ref 65–100)
GLUCOSE BLD STRIP.AUTO-MCNC: 194 MG/DL (ref 65–100)
GLUCOSE SERPL-MCNC: 207 MG/DL (ref 65–100)
HCT VFR BLD AUTO: 34.2 % (ref 35–47)
HGB BLD-MCNC: 9.7 G/DL (ref 11.5–16)
MCH RBC QN AUTO: 23.9 PG (ref 26–34)
MCHC RBC AUTO-ENTMCNC: 28.4 G/DL (ref 30–36.5)
MCV RBC AUTO: 84.2 FL (ref 80–99)
NRBC # BLD: 0 K/UL (ref 0–0.01)
NRBC BLD-RTO: 0 PER 100 WBC
PLATELET # BLD AUTO: 199 K/UL (ref 150–400)
PMV BLD AUTO: 11.9 FL (ref 8.9–12.9)
POTASSIUM SERPL-SCNC: 3.2 MMOL/L (ref 3.5–5.1)
RBC # BLD AUTO: 4.06 M/UL (ref 3.8–5.2)
SERVICE CMNT-IMP: ABNORMAL
SODIUM SERPL-SCNC: 139 MMOL/L (ref 136–145)
WBC # BLD AUTO: 6.4 K/UL (ref 3.6–11)

## 2020-12-19 PROCEDURE — 97530 THERAPEUTIC ACTIVITIES: CPT | Performed by: OCCUPATIONAL THERAPIST

## 2020-12-19 PROCEDURE — 36415 COLL VENOUS BLD VENIPUNCTURE: CPT

## 2020-12-19 PROCEDURE — 87077 CULTURE AEROBIC IDENTIFY: CPT

## 2020-12-19 PROCEDURE — 94640 AIRWAY INHALATION TREATMENT: CPT

## 2020-12-19 PROCEDURE — 74011636637 HC RX REV CODE- 636/637: Performed by: STUDENT IN AN ORGANIZED HEALTH CARE EDUCATION/TRAINING PROGRAM

## 2020-12-19 PROCEDURE — 74011250637 HC RX REV CODE- 250/637: Performed by: STUDENT IN AN ORGANIZED HEALTH CARE EDUCATION/TRAINING PROGRAM

## 2020-12-19 PROCEDURE — 97161 PT EVAL LOW COMPLEX 20 MIN: CPT | Performed by: PHYSICAL THERAPIST

## 2020-12-19 PROCEDURE — 74011250637 HC RX REV CODE- 250/637: Performed by: NURSE PRACTITIONER

## 2020-12-19 PROCEDURE — 65270000029 HC RM PRIVATE

## 2020-12-19 PROCEDURE — 97116 GAIT TRAINING THERAPY: CPT | Performed by: PHYSICAL THERAPIST

## 2020-12-19 PROCEDURE — 74011250637 HC RX REV CODE- 250/637: Performed by: INTERNAL MEDICINE

## 2020-12-19 PROCEDURE — 85027 COMPLETE CBC AUTOMATED: CPT

## 2020-12-19 PROCEDURE — 87186 SC STD MICRODIL/AGAR DIL: CPT

## 2020-12-19 PROCEDURE — 74011000258 HC RX REV CODE- 258: Performed by: NURSE PRACTITIONER

## 2020-12-19 PROCEDURE — 97530 THERAPEUTIC ACTIVITIES: CPT

## 2020-12-19 PROCEDURE — 87205 SMEAR GRAM STAIN: CPT

## 2020-12-19 PROCEDURE — 80048 BASIC METABOLIC PNL TOTAL CA: CPT

## 2020-12-19 PROCEDURE — 74011250636 HC RX REV CODE- 250/636: Performed by: NURSE PRACTITIONER

## 2020-12-19 PROCEDURE — 97165 OT EVAL LOW COMPLEX 30 MIN: CPT

## 2020-12-19 PROCEDURE — 82962 GLUCOSE BLOOD TEST: CPT

## 2020-12-19 RX ORDER — BUMETANIDE 1 MG/1
1 TABLET ORAL DAILY
Status: DISCONTINUED | OUTPATIENT
Start: 2020-12-20 | End: 2020-12-24 | Stop reason: HOSPADM

## 2020-12-19 RX ORDER — POTASSIUM CHLORIDE 750 MG/1
40 TABLET, FILM COATED, EXTENDED RELEASE ORAL EVERY 4 HOURS
Status: COMPLETED | OUTPATIENT
Start: 2020-12-19 | End: 2020-12-19

## 2020-12-19 RX ADMIN — METOPROLOL SUCCINATE 100 MG: 50 TABLET, EXTENDED RELEASE ORAL at 09:28

## 2020-12-19 RX ADMIN — HYDROCODONE BITARTRATE AND ACETAMINOPHEN 1 TABLET: 5; 325 TABLET ORAL at 11:08

## 2020-12-19 RX ADMIN — PANTOPRAZOLE SODIUM 40 MG: 40 TABLET, DELAYED RELEASE ORAL at 09:26

## 2020-12-19 RX ADMIN — BUMETANIDE 2 MG: 1 TABLET ORAL at 09:26

## 2020-12-19 RX ADMIN — POTASSIUM CHLORIDE 40 MEQ: 750 TABLET, EXTENDED RELEASE ORAL at 11:08

## 2020-12-19 RX ADMIN — HYDRALAZINE HYDROCHLORIDE 50 MG: 50 TABLET, FILM COATED ORAL at 17:40

## 2020-12-19 RX ADMIN — PREGABALIN 300 MG: 150 CAPSULE ORAL at 09:24

## 2020-12-19 RX ADMIN — POTASSIUM CHLORIDE 40 MEQ: 750 TABLET, EXTENDED RELEASE ORAL at 09:26

## 2020-12-19 RX ADMIN — LISINOPRIL 20 MG: 20 TABLET ORAL at 09:26

## 2020-12-19 RX ADMIN — Medication 10 ML: at 21:50

## 2020-12-19 RX ADMIN — HYDRALAZINE HYDROCHLORIDE 50 MG: 50 TABLET, FILM COATED ORAL at 09:27

## 2020-12-19 RX ADMIN — DOXYCYCLINE 100 MG: 100 INJECTION, POWDER, LYOPHILIZED, FOR SOLUTION INTRAVENOUS at 22:12

## 2020-12-19 RX ADMIN — ENOXAPARIN SODIUM 120 MG: 120 INJECTION SUBCUTANEOUS at 21:50

## 2020-12-19 RX ADMIN — DOXYCYCLINE 100 MG: 100 INJECTION, POWDER, LYOPHILIZED, FOR SOLUTION INTRAVENOUS at 12:17

## 2020-12-19 RX ADMIN — ISOSORBIDE MONONITRATE 60 MG: 30 TABLET, EXTENDED RELEASE ORAL at 09:26

## 2020-12-19 RX ADMIN — Medication 1 TABLET: at 09:27

## 2020-12-19 RX ADMIN — ENOXAPARIN SODIUM 120 MG: 120 INJECTION SUBCUTANEOUS at 09:28

## 2020-12-19 RX ADMIN — INSULIN LISPRO 2 UNITS: 100 INJECTION, SOLUTION INTRAVENOUS; SUBCUTANEOUS at 17:40

## 2020-12-19 RX ADMIN — INSULIN LISPRO 2 UNITS: 100 INJECTION, SOLUTION INTRAVENOUS; SUBCUTANEOUS at 12:17

## 2020-12-19 RX ADMIN — FLUTICASONE FUROATE AND VILANTEROL TRIFENATATE 1 PUFF: 200; 25 POWDER RESPIRATORY (INHALATION) at 07:56

## 2020-12-19 RX ADMIN — CLONAZEPAM 0.5 MG: 0.5 TABLET ORAL at 21:50

## 2020-12-19 RX ADMIN — INSULIN LISPRO 2 UNITS: 100 INJECTION, SOLUTION INTRAVENOUS; SUBCUTANEOUS at 09:29

## 2020-12-19 RX ADMIN — ASPIRIN 81 MG CHEWABLE TABLET 81 MG: 81 TABLET CHEWABLE at 09:25

## 2020-12-19 RX ADMIN — CALCIUM 500 MG: 500 TABLET ORAL at 09:27

## 2020-12-19 RX ADMIN — ATORVASTATIN CALCIUM 40 MG: 40 TABLET, FILM COATED ORAL at 21:50

## 2020-12-19 RX ADMIN — PREGABALIN 300 MG: 150 CAPSULE ORAL at 17:40

## 2020-12-19 RX ADMIN — HYDROCODONE BITARTRATE AND ACETAMINOPHEN 1 TABLET: 5; 325 TABLET ORAL at 19:21

## 2020-12-19 RX ADMIN — VENLAFAXINE HYDROCHLORIDE 150 MG: 150 CAPSULE, EXTENDED RELEASE ORAL at 09:27

## 2020-12-19 NOTE — PROGRESS NOTES
End of Shift Note    Bedside shift change report given to Lady Carr (oncoming nurse) by Obdulia Dean RN (offgoing nurse). Report included the following information SBAR, Kardex, MAR, Accordion and Recent Results    Shift worked:  9528-5105. Nurse assessment within normal limits except: exertional dyspnea, SHAYLA (own machine at bedside), HTN, periodic confusion upon waking, neuropathy; obesity/pannus; weakness/edema (2+ non pitting) /ruddiness BLE; generalized edema; venous stasis ulcer; moisture related injury to groin area. Shift summary and any significant changes:     Patient to have bone scan and kyphoplasty on 12/21/2020. Discussed plan at length with patient and . Up to the [facility] with 2 assist and back brace (x2 this shift)-very weak, but tolerated better than previous day. PT/OT consult pending. Patient was noted to have some confusion, impulsive behaviour after sleeping without CPAP. Called and spoke with --this is patient's baseline. He also opined that she may be on too many home meds. Concerns for physician to address:  no acute changes     Zone phone for oncoming shift:          Activity:  Activity Level: Bed Rest  Number times ambulated in hallways past shift: 0  Number of times OOB to chair past shift: 0    Cardiac:   Cardiac Monitoring: No      Cardiac Rhythm: Paced    Access:   Current line(s): PIV     Genitourinary:   Urinary status: voiding    Respiratory:   O2 Device: Room air  Chronic home O2 use?: NO  Incentive spirometer at bedside: NO     GI:  Last Bowel Movement Date: 12/18/20  Current diet:  DIET DIABETIC CONSISTENT CARB Regular; FR 1000ML  Passing flatus: YES  Tolerating current diet: YES       Pain Management:   Patient states pain is manageable on current regimen: YES    Skin:  Preet Score: 17  Interventions: PT/OT consult and limit briefs    Patient Safety:  Fall Score:  Total Score: 3  Interventions: bed/chair alarm, assistive device (walker, cane, etc) and pt to call before getting OOB       Length of Stay:  Expected LOS: - - -  Actual LOS: [address], RN

## 2020-12-19 NOTE — PROGRESS NOTES
Problem: Self Care Deficits Care Plan (Adult)  Goal: *Acute Goals and Plan of Care (Insert Text)  Description:   FUNCTIONAL STATUS PRIOR TO ADMISSION: Patient was modified independent using a rollator and spc for functional mobility. STATES RECENTLY STRUGGLING WITH DONNING SOCKS/SHOES. HOME SUPPORT: The patient lived with spouse but did not require assist.    Occupational Therapy Goals  Initiated 12/19/2020  1. Patient will perform grooming at sink with independence within 7 day(s). 2.  Patient will perform lower body dressing with modified independence within 7 day(s). 3.  Patient will perform bathing with modified independence within 7 day(s). 4.  Patient will perform toilet transfers with modified independence within 7 day(s). 5.  Patient will perform all aspects of toileting with independence within 7 day(s). Outcome: Not Met    OCCUPATIONAL THERAPY EVALUATION  Patient: Pasha Massey (40 y.o. female)  Date: 12/19/2020  Primary Diagnosis: Acute respiratory failure with hypoxemia (HCC) [J96.01]        Precautions: fall       ASSESSMENT  Based on the objective data described below, the patient presents with back pain limiting tolerance for activity, mobility and ADL. She is Min A for Mobility and up to Mod A for LB ADLS. Reports is planning to have kyphoplasty on Monday. [facility] at [facility]. Will continue to follow. Current Level of Function Impacting Discharge (ADLs/self-care): up to tMod A    Functional Outcome Measure: The patient scored Total: 50/100 on the Barthel Index outcome measure which is indicative of 50% impaired ability to care for basic self needs/dependency on others. Other factors to consider for discharge: none     Patient will benefit from skilled therapy intervention to address the above noted impairments.        PLAN :  Recommendations and Planned Interventions: self care training, functional mobility training, therapeutic exercise, balance training, patient education, home safety training, and family training/education    Frequency/Duration: Patient will be followed by occupational therapy 3 times a week to address goals. Recommendation for discharge: (in order for the patient to meet his/her long term goals)  Occupational therapy at least 2 days/week in the home AND ensure assist and/or supervision for safety with LB ADLS    This discharge recommendation:  A follow-up discussion with the attending provider and/or case management is planned    IF patient discharges home will need the following DME: patient owns DME required for discharge       SUBJECTIVE:   Patient stated recently I've had a hard time getting to my feet.     OBJECTIVE DATA SUMMARY:   HISTORY:   Past Medical History:   Diagnosis Date    Anxiety 1/22/2018    Arthritis     OA    Asthma     Diabetes ([facility] [address]     Essential hypertension     GERD (gastroesophageal reflux disease)     Hypercholesterolemia 1/22/2018    Hypertension     Ill-defined condition     diverticulitis    Long-term use of high-risk medication 1/22/2018    Neuropathy     Obesity (BMI 30-39.9) 4/30/2019    Other ill-defined conditions(799.89)     IBS, spinal stenosis    Plantar fasciitis     Psychiatric disorder     depression, anxiety    Sleep apnea     uses CPAP    Type 2 diabetes mellitus with diabetic neuropathy, without long-term current use of insulin ([facility] [address] 6/5/2016     Past Surgical History:   Procedure Laterality Date    CARDIAC SURG PROCEDURE UNLIST      stent    COLONOSCOPY N/A 3/14/2018    COLONOSCOPY performed by Lauryn Lang MD at [facility] ENDOSCOPY    HX APPENDECTOMY      HX CHOLECYSTECTOMY  11/15/2018    HX HYSTERECTOMY      HX PACEMAKER         Expanded or extensive additional review of patient history:     Home Situation  Home Environment: Private residence  # Steps to Enter: 2  Rails to Enter: Yes  One/Two Story Residence: One story  Living Alone: Yes  Current DME Used/Available at Home: Gordon Lavender, rollator, Cane, straight, Safety frame toliet  Tub or Shower Type: Tub/Shower combination    Hand dominance: Right    EXAMINATION OF PERFORMANCE DEFICITS:  Cognitive/Behavioral Status:  Neurologic State: Alert  Orientation Level: Oriented X4  Cognition: Appropriate safety awareness; Appropriate for age attention/concentration; Appropriate decision making; Follows commands             Skin: ulcers on LE    Edema: none    Hearing: Auditory  Auditory Impairment: Hard of hearing, bilateral    Vision/Perceptual:                                Corrective Lenses: Glasses    Range of Motion:    AROM: Generally decreased, functional                         Strength:    Strength: Generally decreased, functional                Coordination:  Coordination: Generally decreased, functional  Fine Motor Skills-Upper: Left Impaired;Right Impaired    Gross Motor Skills-Upper: Left Intact; Right Intact    Tone & Sensation:    Tone: Normal  Sensation: Impaired(diabetic neuropathy)                      Balance:  Sitting: Intact  Standing: Impaired; Without support  Standing - Static: Constant support;Good  Standing - Dynamic : Constant support; Fair    Functional Mobility and Transfers for ADLs:  Bed Mobility:  Rolling: Minimum assistance; Additional time  Supine to Sit: Minimum assistance;Assist x1    Transfers:  Sit to Stand: Assist x1;Minimum assistance  Stand to Sit: Contact guard assistance;Assist x1  Bed to Chair: Minimum assistance;Assist x1;Adaptive equipment    ADL Assessment:  Feeding: Independent    Oral Facial Hygiene/Grooming: Independent    Bathing: Minimum assistance    Upper Body Dressing: Independent    Lower Body Dressing: Moderate assistance    Toileting: Minimum assistance                ADL Intervention and task modifications:    Lower Body Dressing Assistance  Socks:  Total assistance (dependent)                 Functional Measure:  Barthel Index:    Bathin  Bladder: 5  Bowels: 10  Groomin  Dressin  Feeding: 10  Mobility: 0  Stairs: 0  Toilet Use: 5  Transfer (Bed to Chair and Back): 10  Total: 50/100        The Barthel ADL Index: Guidelines  1. The index should be used as a record of what a patient does, not as a record of what a patient could do. 2. The main aim is to establish degree of independence from any help, physical or verbal, however minor and for whatever reason. 3. The need for supervision renders the patient not independent. 4. A patient's performance should be established using the best available evidence. Asking the patient, friends/relatives and nurses are the usual sources, but direct observation and common sense are also important. However direct testing is not needed. 5. Usually the patient's performance over the preceding 24-48 hours is important, but occasionally longer periods will be relevant. 6. Middle categories imply that the patient supplies over 50 per cent of the effort. 7. Use of aids to be independent is allowed. Ana Lora., Barthel, D.W. (0651). Functional evaluation: the Barthel Index. [address] [facility] (14)2. [employer] femi ERNIQUE Hernandez, Heriberto Soni., Kannan Daley., [facility], [address] [facility] (1999). Measuring the change indisability after inpatient rehabilitation; comparison of the responsiveness of the Barthel Index and Functional [place] Measure. Journal of Neurology, Neurosurgery, and Psychiatry, 66(4), 180-881. Lizzy Freitas, NFELI.LEANDRO, PAWAN Izaguirre, & Jenn Juárez, MMarleneA. (2004.) Assessment of post-stroke quality of life in cost-effectiveness studies: The usefulness of the Barthel Index and the EuroQoL-5D.  Quality of Life Research, 15, 104-60         Occupational Therapy Evaluation Charge Determination   History Examination Decision-Making   LOW Complexity : Brief history review  LOW Complexity : 1-3 performance deficits relating to physical, cognitive , or psychosocial skils that result in activity limitations and / or participation restrictions  LOW Complexity : No comorbidities that affect functional and no verbal or physical assistance needed to complete eval tasks       Based on the above components, the patient evaluation is determined to be of the following complexity level: LOW   Pain Ratin/10, post activity, patient calm and positioned in chair comfortably    Activity Tolerance:   Fair    After treatment patient left in no apparent distress:    Sitting in chair and Call bell within reach    COMMUNICATION/EDUCATION:   The patients plan of care was discussed with: Physical therapist and Registered nurse. Home safety education was provided and the patient/caregiver indicated understanding., Patient/family have participated as able in goal setting and plan of care. , and Patient/family agree to work toward stated goals and plan of care. This patients plan of care is appropriate for delegation to \[facility] [place] [facility]\"".     Thank you for this referral.  Sherry Handy  Time Calculation: 24 mins 65

## 2020-12-19 NOTE — PROGRESS NOTES
Orders received, chart reviewed and patient evaluated by occupational therapy. Pending progression with skilled acute occupational therapy, recommend:  Occupational therapy at least 2 days/week in the home AND ensure assist and/or supervision for safety with ADLS     Plans for kyphoplasty Monday. Recommend with nursing patient to complete as able in order to maintain strength, endurance and independence: OOB to chair 3x/day with Min A and functional mobility to the bathroom with CGA or use of BSC. Thank you for your assistance. Full evaluation to follow.

## 2020-12-19 NOTE — PROGRESS NOTES
Problem: Mobility Impaired (Adult and Pediatric)  Goal: *Acute Goals and Plan of Care (Insert Text)  Description: FUNCTIONAL STATUS PRIOR TO ADMISSION: Patient was modified independent using a rollator for functional mobility. She has been declining recently, however    [address]: The patient lived with her  who assists her as needed. Physical Therapy Goals  Initiated 12/19/2020  1. Patient will move from supine to sit and sit to supine  in bed with independence within 7 day(s). 2.  Patient will transfer from bed to chair and chair to bed with modified independence using the least restrictive device within 7 day(s). 3.  Patient will perform sit to stand with modified independence within 7 day(s). 4.  Patient will ambulate with modified independence for 50 feet with the least restrictive device within 7 day(s). 5.  Patient will ascend/descend 2 stairs with 1-2 handrail(s) with minimal assistance/contact guard assist within 7 day(s). Outcome: Not Met   PHYSICAL THERAPY EVALUATION  Patient: Xavier Herndon (89 y.o. female)  Date: 12/19/2020  Primary Diagnosis: Acute respiratory failure with hypoxemia (HCC) [J96.01]        Precautions: fall       ASSESSMENT  Based on the objective data described below, the patient presents with general decrease in strength/activity tolerance, decreased standing balance/tolerance, and decreased functional mobility, including bed mobility/transfers/gait. She is limited by back pain. Patient states that she is to have a kyphoplasty on Monday (12/21/2020). She will benefit from HHPT upon discharge to maximize strength/functional independence in her home setting. Current Level of Function Impacting Discharge (mobility/balance): minimal assist with bed mobility, transfers, and ambulation 5 feet with rolling walker    Functional Outcome Measure:   The patient scored 4/20 on the Elderly Mobility Scale outcome measure which is indicative of dependence with mobility/ADLs. Other factors to consider for discharge:  can assist, has necessary DME     Patient will benefit from skilled therapy intervention to address the above noted impairments. PLAN :  Recommendations and Planned Interventions: bed mobility training, transfer training, gait training, therapeutic exercises, neuromuscular re-education, patient and family training/education, and therapeutic activities      Frequency/Duration: Patient will be followed by physical therapy:  4 times a week to address goals. Recommendation for discharge: (in order for the patient to meet his/her long term goals)  Physical therapy at least 2 days/week in the home AND ensure assist and/or supervision for safety with functional mobility    This discharge recommendation:  Has not yet been discussed the attending provider and/or case management    IF patient discharges home will need the following DME: patient owns DME required for discharge         SUBJECTIVE:   Patient stated I've had this brace for 6 months, I only use it when I feel like I need to.  Patient has lumbar corset in her room.     OBJECTIVE DATA SUMMARY:   HISTORY:    Past Medical History:   Diagnosis Date    Anxiety 1/22/2018    Arthritis     OA    Asthma     Diabetes ([address]     Essential hypertension     GERD (gastroesophageal reflux disease)     Hypercholesterolemia 1/22/2018    Hypertension     Ill-defined condition     diverticulitis    Long-term use of high-risk medication 1/22/2018    Neuropathy     Obesity (BMI 30-39.9) 4/30/2019    Other ill-defined conditions(799.89)     IBS, spinal stenosis    Plantar fasciitis     Psychiatric disorder     depression, anxiety    Sleep apnea     uses CPAP    Type 2 diabetes mellitus with diabetic neuropathy, without long-term current use of insulin ([address] 6/5/2016     Past Surgical History:   Procedure Laterality Date    CARDIAC SURG PROCEDURE UNLIST      stent    COLONOSCOPY N/A 3/14/2018 COLONOSCOPY performed by Gunnar Glasgow MD at [facility] ENDOSCOPY    HX APPENDECTOMY      HX CHOLECYSTECTOMY  11/15/2018    HX HYSTERECTOMY      HX PACEMAKER         Home Situation  Home Environment: Private residence  # Steps to Enter: 2  Rails to Enter: Yes  One/Two Story Residence: One story  Living Alone: Yes  Current DME Used/Available at Home: Walker, rollator, Cane, straight, Safety frame toliet  Tub or Shower Type: Tub/Shower combination    EXAMINATION/PRESENTATION/DECISION MAKING:   Critical Behavior:  Neurologic State: Alert  Orientation Level: Oriented X4  Cognition: Appropriate safety awareness, Appropriate for age attention/concentration, Appropriate decision making, Follows commands     Hearing: Auditory  Auditory Impairment: Hard of hearing, bilateral  Skin:  Dressing around left ankle  Edema: both distal LEs  Range Of Motion:  AROM: Generally decreased, functional                       Strength:    Strength: Generally decreased, functional                    Tone & Sensation:   Tone: Normal              Sensation: Impaired(diabetic neuropathy)               Coordination:  Coordination: Generally decreased, functional  Vision:   Corrective Lenses: Glasses  Functional Mobility:  Bed Mobility:  Rolling: Minimum assistance; Additional time  Supine to Sit: Minimum assistance;Assist x1        Transfers:  Sit to Stand: Assist x1;Minimum assistance  Stand to Sit: Contact guard assistance;Assist x1        Bed to Chair: Minimum assistance;Assist x1;Adaptive equipment              Balance:   Sitting: Intact  Standing: Impaired; Without support  Standing - Static: Constant support;Good  Standing - Dynamic : Constant support; Fair  Ambulation/Gait Training:  Distance (ft): 5 Feet (ft)  Assistive Device: Walker, rolling  Ambulation - Level of Assistance: Minimal assistance;Assist x1     Gait Description (WDL): Exceptions to WDL  Gait Abnormalities: Decreased step clearance  Right Side Weight Bearing: Full  Left Side Weight Bearing: Full  Base of Support: Widened     Speed/Brielle: Slow  Step Length: Right shortened;Left shortened       Therapeutic Exercises:   AROM both UE/LEs prior to mobility training    Functional Measure:    Elder Mobility Scale    4/20         Scores under 10 - generally these patients are dependent in mobility maneuvers; require help with  basic ADL, such as transfers, toileting and dressing. Scores between 10 - 13 - generally these patients are borderline in terms of safe mobility and  independence in ADL i.e. they require some help with some mobility maneuvers. Scores over 14 - Generally these patients are able to perform mobility maneuvers alone and safely  and are independent in basic ADL. Physical Therapy Evaluation Charge Determination   History Examination Presentation Decision-Making   MEDIUM  Complexity : 1-2 comorbidities / personal factors will impact the outcome/ POC  LOW Complexity : 1-2 Standardized tests and measures addressing body structure, function, activity limitation and / or participation in recreation  LOW Complexity : Stable, uncomplicated  LOW Complexity : FOTO score of       Based on the above components, the patient evaluation is determined to be of the following complexity level: LOW     Pain Rating:  Patient reports 8/10 without any outward signs of distress, nursing notified    Activity Tolerance:   Fair    After treatment patient left in no apparent distress:   Sitting in chair and Call bell within reach    COMMUNICATION/EDUCATION:   The patients plan of care was discussed with: Occupational therapist and Registered nurse. Fall prevention education was provided and the patient/caregiver indicated understanding., Patient/family have participated as able in goal setting and plan of care. , and Patient/family agree to work toward stated goals and plan of care.     Thank you for this referral.  Shavonne Golden, PT   Time Calculation: 15 mins

## 2020-12-19 NOTE — PROGRESS NOTES
Comprehensive Nutrition Assessment    Type and Reason for Visit: Initial, RD nutrition re-screen/LOS    Nutrition Recommendations/Plan:   Continue CCD, FR per MD  Please document % meals and supplements consumed in flowsheet I/O's under intake     Nutrition Assessment:      Chart reviewed for LOS. Pt noted for hypokalemia, spinal stenosis, DM2, acute on chronic respiratory failure, CHF, pulmonary HTN, CAD, cellulitis, CKD3, anemia, afib, HLD, HTN, GERD, anemia of chronic disease. Pt reports good appetite today and PTA. She often has issues with constipation and takes miralax PRN and maalox if that doesn't do enough. She has to restrict her fluid intake d/t CHF so we discussed techniques to help promote better bowel regularity. Wt hx shows some fluctuations, likely d/t fluid. Wt Readings from Last 5 Encounters:   12/19/20 119.3 kg (263 lb 0.1 oz)   12/10/20 127.7 kg (281 lb 8 oz)   10/28/20 125.2 kg (276 lb)   09/29/20 126.6 kg (279 lb)   08/26/20 127.9 kg (282 lb)   ]      Estimated Daily Nutrient Needs:  Energy (kcal): 2074 kcal (BMR x 1. 3AF - 250); Weight Used for Energy Requirements: Current  Protein (g): 79-92g (1.2-1.4g/kgIBW); Weight Used for Protein Requirements: Ideal  Fluid (ml/day): 1000mL per MD; Method Used for Fluid Requirements: Other (comment)      Nutrition Related Findings:  Labs: -183mg/dL, A1c 6.0, Hgb 9.7, K 3.2. Meds: bumex, oscal, D3, doxycycline, lovenox, humalog, protonix. Edema 2+BLE, BM 12/18. Wounds:    Venous stasis       Current Nutrition Therapies:  DIET DIABETIC CONSISTENT CARB Regular; FR 1000ML    Anthropometric Measures:  · Height:  5' 9\" (175.3 cm)  · Current Body Wt:  119.3 kg (263 lb 0.1 oz)    · BMI Category:  Obese class 2 (BMI 35.0-39. 9)       Nutrition Diagnosis:   · No nutrition diagnosis at this time related to   as evidenced by        Nutrition Interventions:   Food and/or Nutrient Delivery: Continue current diet  Nutrition Education and Counseling: No recommendations at this time  Coordination of Nutrition Care: Continue to monitor while inpatient    Goals:  Pt will consume >50% meals next 4-6 days       Nutrition Monitoring and Evaluation:   Behavioral-Environmental Outcomes: None identified  Food/Nutrient Intake Outcomes: Food and nutrient intake  Physical Signs/Symptoms Outcomes: Biochemical data, Fluid status or edema, Skin, Weight, GI status    Discharge Planning:    Continue current diet     Electronically signed by Kemi Hall RD on 12/19/2020 at 5:43 PM    Contact: ADRIENNE  Pager [phone]

## 2020-12-19 NOTE — PROGRESS NOTES
End of Shift Note    Bedside shift change report given to Melissa (oncoming nurse) by Kaylene Junior RN (offgoing nurse). Report included the following information SBAR, Kardex and MAR    Shift worked:  7p-7a     Shift summary and any significant changes:     Pt has been stable through shift, meds given according to mar, labs drawn, pt did not complain of pain, wound care done     Concerns for physician to address:       Zone phone for oncoming shift:          Activity:  Activity Level: Bed Rest  Number times ambulated in hallways past shift: 0  Number of times OOB to chair past shift: 0    Cardiac:   Cardiac Monitoring: No      Cardiac Rhythm: Paced    Access:   Current line(s): PIV     Genitourinary:   Urinary status: voiding    Respiratory:   O2 Device: Room air  Chronic home O2 use?: NO  Incentive spirometer at bedside: NO     GI:  Last Bowel Movement Date: 12/18/20  Current diet:  DIET DIABETIC CONSISTENT CARB Regular; FR 1000ML  Passing flatus: YES  Tolerating current diet: YES       Pain Management:   Patient states pain is manageable on current regimen: YES    Skin:  Preet Score: 17  Interventions: turn team, speciality bed and increase time out of bed    Patient Safety:  Fall Score:  Total Score: 3  Interventions: bed/chair alarm, gripper socks and pt to call before getting OOB       Length of Stay:  Expected LOS: - - -  Actual LOS: [address] [facility] [place], [place]

## 2020-12-19 NOTE — PROGRESS NOTES
End of Shift Note    Bedside shift change report given to Rishi Vidal (oncoming nurse) by Sarah Mae (offgoing nurse). Report included the following information SBAR, Kardex, MAR and Accordion    Shift worked:  7a-7p     Shift summary and any significant changes:    Pt remained stable throughout shift. Scheduled meds given, PRN meds given for pain, education provided. Hourly rounding completed, pt up x1 assist with walker to the bathroom. Pt remained in chair most of the day, pt turns self. Concerns for physician to address:    Zone phone for oncoming shift:       Activity:  Activity Level: Bed Rest  Number times ambulated in hallways past shift: 0  Number of times OOB to chair past shift: 2    Cardiac:   Cardiac Monitoring: No      Cardiac Rhythm: Paced    Access:   Current line(s): PIV     Genitourinary:   Urinary status: external catheter    Respiratory:   O2 Device: Room air  Chronic home O2 use?: NO  Incentive spirometer at bedside: NO     GI:  Last Bowel Movement Date: 12/18/20  Current diet:  DIET DIABETIC CONSISTENT CARB Regular; FR 1000ML  Passing flatus: YES  Tolerating current diet: YES       Pain Management:   Patient states pain is manageable on current regimen: YES    Skin:  Preet Score: 17  Interventions: speciality bed, float heels and increase time out of bed    Patient Safety:  Fall Score:  Total Score: 3  Interventions: bed/chair alarm, gripper socks and pt to call before getting OOB       Length of Stay:  Expected LOS: - - -  Actual LOS: [address] [facility]

## 2020-12-19 NOTE — PROGRESS NOTES
Spiritual Care Assessment/Progress Note  [facility]      NAME: Nivia Kanner      MRN: [id number]  AGE: 78 y.o.  SEX: female  Congregation Affiliation: Orthodox   Language: English     12/19/2020     Total Time (in minutes): 12     Spiritual Assessment begun in [facility] through conversation with:         [x]Patient        [] Family    [] Friend(s)              Spiritual beliefs: (Please include comment if needed)     [] Identifies with a kamron tradition:         [] Supported by a kamron community:            [] Claims no spiritual orientation:           [] Seeking spiritual identity:                [] Adheres to an individual form of spirituality:           [x] Not able to assess:    Did not indicate                       Identified resources for coping:      [x] Prayer                               [] Music                  [] Guided Imagery     [x] Family/friends                 [] Pet visits     [] Devotional reading                         [] Unknown     [] Other:                                            Interventions offered during this visit: (See comments for more details)    Patient Interventions: Affirmation of emotions/emotional suffering, Affirmation of kamron, Catharsis/review of pertinent events in supportive environment, Initial/Spiritual assessment, patient floor, Prayer (assurance of)           Plan of Care:     [] Support spiritual and/or cultural needs    [] Support AMD and/or advance care planning process      [] Support grieving process   [] Coordinate Rites and/or Rituals    [] Coordination with community clergy   [x] No spiritual needs identified at this time   [] Detailed Plan of Care below (See Comments)  [] Make referral to Music Therapy  [] Make referral to Pet Therapy     [] Make referral to Addiction services  [] Make referral to [facility]  [] Make referral to Spiritual Care Partner  [] No future visits requested        [] Follow up upon further referrals     Comments:  Initial visit in Clinical Observation unit for spiritual assessment. Patient's  was present. Patient was seated in recliner near window, appearing to be in good spirits. She shared her hope of being discharged in a few days.   She expressed no spiritual concerns at this time, but was receptive to assurance of prayer     WARD Orourke, [facility], Staff [address]    [facility] Paging Service  [phone])

## 2020-12-19 NOTE — PROGRESS NOTES
Orders received, chart reviewed and patient evaluated by physical therapy. Pending progression with skilled acute physical therapy, recommend:  Physical therapy at least 2 days/week in the home AND ensure assist and/or supervision for safety with functional mobility    Recommend with nursing patient to complete as able in order to maintain strength, endurance and independence: OOB to chair 3x/day with minimal assist x 1 and ambulating with rolling walker. Thank you for your assistance. Full evaluation to follow.

## 2020-12-19 NOTE — PROGRESS NOTES
Mr and Mrs Zuri Gonzalez, this is the plan of care as of 12/18/2020 which we discussed earlier:    Overview:  Bone Scan and kyphoplasty 12/21/2020  Pain management  Physical Therapy  Wound Care    Summary:  CT scan revealed a compression fracture on spine. This type of procedure is normally treated with a procedure called a kyphoplasty. (More specific information about this procedure is in the attached document). There has been a delay in getting this procedure done because of two factors:    1. Mrs Zuri Gonzalez is on a blood thinner (Pradaxa), which does not \"wear off\" for 5 days. Performing a kyphoplasty before this waiting period would put her at risk for major bleeding. 2. An MRI scan cannot be performed on Mrs. Zuri Gonzalez. The powerful magnet of the MRI machine makes it unsafe for people with pacemakers to be tested with this method. This is a challenge because our radiologists usually use the images obtained from MRI to pinpoint the best place to insert a needle. (CT scans are not detailed enough to provide this information). The solution to this issue is for Mrs Zuri Gonzalez to have a full body bone scan. The earliest date that this scan can be done is Monday, 12/21/2020. Plan:  On Monday, December 21st, patient is so have a bone scan followed by the kyphoplasty procedure. The exact times are uncertain at this point, but Dr. Markell Fitzgerald is slated to perform the procedure. The plan for this weekend will be to focus on pain management and physical therapy. I have included some printed information about the kyphoplasty procedure, including a printout of the picture that we looked at earlier today. Please feel free to ask us if you have any additional concerns. You have my best wishes for a successful procedure and alvares recovery. Happy Holidays!      Tammie Ding RN

## 2020-12-19 NOTE — PROGRESS NOTES
Problem: Falls - Risk of  Goal: *Absence of Falls  Description: Document Eulababar Andrew Fall Risk and appropriate interventions in the flowsheet. Outcome: Progressing Towards Goal  Note: Fall Risk Interventions:  Mobility Interventions: Bed/chair exit alarm, Communicate number of staff needed for ambulation/transfer         Medication Interventions: Evaluate medications/consider consulting pharmacy, Patient to call before getting OOB    Elimination Interventions: Bed/chair exit alarm, Call light in reach              Problem: Pressure Injury - Risk of  Goal: *Prevention of pressure injury  Description: Document Preet Scale and appropriate interventions in the flowsheet. Outcome: Progressing Towards Goal  Note: Pressure Injury Interventions:  Sensory Interventions: Assess changes in LOC, Keep linens dry and wrinkle-free, Maintain/enhance activity level, Minimize linen layers    Moisture Interventions: Absorbent underpads, Apply protective barrier, creams and emollients    Activity Interventions: Increase time out of bed    Mobility Interventions: Assess need for specialty bed, Float heels, HOB 30 degrees or less    Nutrition Interventions: Document food/fluid/supplement intake    Friction and Shear Interventions: Apply protective barrier, creams and emollients                Problem: Risk for Spread of Infection  Goal: Prevent transmission of infectious organism to others  Description: Prevent the transmission of infectious organisms to other patients, staff members, and visitors.   Outcome: Progressing Towards Goal

## 2020-12-19 NOTE — PROGRESS NOTES
I reviewed pertinent labs and imaging, and discussed /agreed on the plan of care with Dr. Ed Morales      Hospitalist Progress Note    NAME: Mj Rodriguez   :  1950   MRN:  [id number]       Assessment / Plan:  Hypokalemia -   - improved from 2.9 to 3.2  - increase Potassium  - cont to monitor    Worsening Back Pain with LE Weakness  Spinal Stenosis   ·    Back brace brought in by  , pt unable to transfer with brace  · Crying out in pain  Attempting to work with PT   · Pain limiting pt ability to participate in PT . Enc pt to use pain medication PRN   ·   · CT spine/lumbar  - \"Acute superior endplate fracture of L1 with Route 15% height loss. Multilevel degenerative changes with bilateral neural foraminal narrowing at the L4-5 and L5-S1 levels. \"  · Appreciate Neurosurgery input - not a surgical candidate, cContinue tramadol and cyclobenzaprine for pain added Norco   · Cont to  hold Pradaxa in anticipation of kyphoplasty in on Monday   · Pt unable to get MRI  Due to pacemaker will order body scan scheduled for monday  ·  place on [facility] with Lovenox 120 mg Q 12  CHADS score 2      Acute on Chronic Respiratory Failure with Hypoxia related to Acute on Chronic Systolic and Diastolic Congestive Heart Failure O2 82%, Temp 100.6 on admission  · Rapid and PCR COVID results NEGATIVE  · CXR  - Without acute infiltrate or congestion   · CTA Chest - \"Small bilateral pleural effusions with overlying atelectasis. No pulmonary embolus or other acute cardiopulmonary process. \"    · ECHO 2020 - EF 35-40%. Dilated left ventricle. Mild concentric hypertrophy. Severe systolic function. Left ventricular diastolic function.   Appreciate  Cardiology - no plans for any invasive cardiac work up ,not grossly overloaded  · BNP  2724  · O2 weaned to room air sat 92-94%  · Cont  bumex Po  Will decrease to 1 mg    · Cr sl up 1.64   · Continue BB, imdur and lisinopril   · Strict I&Os  Daily Weights - 263 lbs to day admission wt 287  · 1L Fluid Restriction  · Appreciate Cardiology input     Pulmonary Hypertension   Coronary Artery Disease s/p PCI   Ischemic Cardiomyopathy   S/p BiV ICD 2018  Chronic Atrial Fibrillation - V paced currently   Hyperlipidemia   Hypertension   · Continue ASA, atorvastatin, pradaxa, hydralazine, imdur, metoprolol, lisinopril  · -140 improved   · Monitor BP -       Acute on Chronic LLE Cellulitis Temp 100.6 on admission  Chronic Venous Stasis of BLE   History of Chronic Diabetic Ulcer of Left Foot - healed   · Reports chronic LE cellulitis x 5 years  · LLE with erythema and venous stasis ulcer   · Completed keflex still no improvement ,will add doxy IV x 5 days   · Erythema extending towards knee open areas noted and weeping  · Wound culture obtained  today  ,pt clinically not toxic   · cont Wound care -daily      Chronic Kidney Disease Stage III Baseline 1.5 - resolved   · Cr at baseline, Cr 1.60 today   · decrease Bumex   · Follow BMP   · Avoid Nephrotoxic Medications      Uncontrolled Type II Diabetes with Diabetic Neuropathy   Morbid Obesity BMI 42  · SSI   · Continue pregabalin    · Cont to Hold PTA glimepiride   · Last HgbA1c 8.4 (8/2020) - now at 6.0   · BS less than 200- controlled      Anemia of Chronic Disease Stable, monitor   Asthma Continue albuterol inhaler, breo-ellipta   GERD Continue protonix   Anxiety/Depression Continue venlafaxine-SR, clonazepam           / Body mass index is 40.37 kg/m². Code status: Full  Prophylaxis: Pradaxa  Recommended Disposition: Home w/Family     Subjective:     Chief Complaint / Reason for Physician Visit  \"I still hurt but it better   \". Discussed with RN events overnight.    Pt seen this morning oob in recliner , still has pain but moving better , brace on ,     Review of Systems:  Symptom Y/N Comments  Symptom Y/N Comments   Fever/Chills n   Chest Pain n    Poor Appetite n   Edema n    Cough n   Abdominal Pain n    Sputum n   Joint Pain n    SOB/STEEN n Pruritis/Rash y    Nausea/vomit    Tolerating PT/OT     Diarrhea n   Tolerating Diet n    Constipation    Other       Could NOT obtain due to:      Objective:     VITALS:   Last 24hrs VS reviewed since prior progress note. Most recent are:  Patient Vitals for the past 24 hrs:   Temp Pulse Resp BP SpO2   12/19/20 0835 98.2 °F (36.8 °C) 77 18 (!) 155/86 94 %   12/19/20 0756     94 %   12/19/20 0404 98.3 °F (36.8 °C) 80 18 121/79 95 %   12/18/20 2002 97.6 °F (36.4 °C) 80 16 (!) 140/76    12/18/20 1758 97.7 °F (36.5 °C) 80 20 (!) 149/73 93 %     No intake or output data in the 24 hours ending 12/19/20 1041     I had a face to face encounter and independently examined this patient on 12/19/2020, as outlined below:  PHYSICAL EXAM:  General: Obese . Alert, cooperative, no acute distress    EENT:  EOMI. Anicteric sclerae. MMM   Resp:    no wheezing or rales. No accessory muscle use,  RA , CPAP at night   CV:  S1S2  Valentino Le 1+  Edema nl cap refill   GI:  Soft, Non distended, Non tender. +Bowel sounds  Neurologic:  Alert and oriented X 3, normal speech,   Psych:   . Not anxious nor agitated, occasional tearful   Skin:  No rashes. No jaundice, venous stasis ulcers on L leg ,  erythema to L shin extending superiorl to ulcer     Reviewed most current lab test results and cultures  YES  Reviewed most current radiology test results   YES  Review and summation of old records today    NO  Reviewed patient's current orders and MAR    YES  PMH/ reviewed - no change compared to H&P  ________________________________________________________________________  Care Plan discussed with:    Comments   Patient x    Family      RN x    Care Manager     Consultant                       x Multidiciplinary team rounds were held today with , nursing, pharmacist and clinical coordinator. Patient's plan of care was discussed; medications were reviewed and discharge planning was addressed. ________________________________________________________________________  Total NON critical care TIME:  30  Minutes    Total CRITICAL CARE TIME Spent:   Minutes non procedure based      Comments   >50% of visit spent in counseling and coordination of care     ________________________________________________________________________  Renee Montano NP     Procedures: see electronic medical records for all procedures/Xrays and details which were not copied into this note but were reviewed prior to creation of Plan. LABS:  I reviewed today's most current labs and imaging studies. Pertinent labs include:  Recent Labs     12/19/20  0334 12/18/20  0403 12/17/20  0608   WBC 6.4 6.9 5.4   HGB 9.7* 9.4* 9.0*   HCT 34.2* 32.3* 30.6*    178 155     Recent Labs     12/19/20  0334 12/18/20  0403 12/17/20  0608    140 142   K 3.2* 3.7 3.5    109* 109*   CO2 30 26 30   * 144* 111*   BUN 23* 24* 19   CREA 1.60* 1.37* 1.37*   CA 9.3 9.1 9.1       Signed: Ana Montano NP

## 2020-12-20 LAB
ANION GAP SERPL CALC-SCNC: 4 MMOL/L (ref 5–15)
BNP SERPL-MCNC: 1334 PG/ML
BUN SERPL-MCNC: 26 MG/DL (ref 6–20)
BUN/CREAT SERPL: 16 (ref 12–20)
CALCIUM SERPL-MCNC: 9.5 MG/DL (ref 8.5–10.1)
CHLORIDE SERPL-SCNC: 110 MMOL/L (ref 97–108)
CO2 SERPL-SCNC: 28 MMOL/L (ref 21–32)
CREAT SERPL-MCNC: 1.58 MG/DL (ref 0.55–1.02)
ERYTHROCYTE [DISTWIDTH] IN BLOOD BY AUTOMATED COUNT: 19.7 % (ref 11.5–14.5)
GLUCOSE BLD STRIP.AUTO-MCNC: 124 MG/DL (ref 65–100)
GLUCOSE BLD STRIP.AUTO-MCNC: 130 MG/DL (ref 65–100)
GLUCOSE BLD STRIP.AUTO-MCNC: 144 MG/DL (ref 65–100)
GLUCOSE BLD STRIP.AUTO-MCNC: 186 MG/DL (ref 65–100)
GLUCOSE SERPL-MCNC: 116 MG/DL (ref 65–100)
HCT VFR BLD AUTO: 34 % (ref 35–47)
HGB BLD-MCNC: 9.6 G/DL (ref 11.5–16)
MCH RBC QN AUTO: 23.8 PG (ref 26–34)
MCHC RBC AUTO-ENTMCNC: 28.2 G/DL (ref 30–36.5)
MCV RBC AUTO: 84.4 FL (ref 80–99)
NRBC # BLD: 0.02 K/UL (ref 0–0.01)
NRBC BLD-RTO: 0.4 PER 100 WBC
PLATELET # BLD AUTO: 196 K/UL (ref 150–400)
PMV BLD AUTO: 11.7 FL (ref 8.9–12.9)
POTASSIUM SERPL-SCNC: 3.6 MMOL/L (ref 3.5–5.1)
RBC # BLD AUTO: 4.03 M/UL (ref 3.8–5.2)
SERVICE CMNT-IMP: ABNORMAL
SODIUM SERPL-SCNC: 142 MMOL/L (ref 136–145)
WBC # BLD AUTO: 5.3 K/UL (ref 3.6–11)

## 2020-12-20 PROCEDURE — 74011000258 HC RX REV CODE- 258: Performed by: NURSE PRACTITIONER

## 2020-12-20 PROCEDURE — 74011250637 HC RX REV CODE- 250/637: Performed by: NURSE PRACTITIONER

## 2020-12-20 PROCEDURE — 94640 AIRWAY INHALATION TREATMENT: CPT

## 2020-12-20 PROCEDURE — 36415 COLL VENOUS BLD VENIPUNCTURE: CPT

## 2020-12-20 PROCEDURE — 74011250637 HC RX REV CODE- 250/637: Performed by: STUDENT IN AN ORGANIZED HEALTH CARE EDUCATION/TRAINING PROGRAM

## 2020-12-20 PROCEDURE — 83880 ASSAY OF NATRIURETIC PEPTIDE: CPT

## 2020-12-20 PROCEDURE — 74011250636 HC RX REV CODE- 250/636: Performed by: NURSE PRACTITIONER

## 2020-12-20 PROCEDURE — 65270000029 HC RM PRIVATE

## 2020-12-20 PROCEDURE — 2709999900 HC NON-CHARGEABLE SUPPLY

## 2020-12-20 PROCEDURE — 80048 BASIC METABOLIC PNL TOTAL CA: CPT

## 2020-12-20 PROCEDURE — 82962 GLUCOSE BLOOD TEST: CPT

## 2020-12-20 PROCEDURE — 85027 COMPLETE CBC AUTOMATED: CPT

## 2020-12-20 PROCEDURE — 74011250637 HC RX REV CODE- 250/637: Performed by: INTERNAL MEDICINE

## 2020-12-20 RX ORDER — ENOXAPARIN SODIUM 150 MG/ML
120 INJECTION SUBCUTANEOUS ONCE
Status: COMPLETED | OUTPATIENT
Start: 2020-12-20 | End: 2020-12-20

## 2020-12-20 RX ADMIN — HYDROCODONE BITARTRATE AND ACETAMINOPHEN 1 TABLET: 5; 325 TABLET ORAL at 05:17

## 2020-12-20 RX ADMIN — HYDRALAZINE HYDROCHLORIDE 50 MG: 50 TABLET, FILM COATED ORAL at 17:05

## 2020-12-20 RX ADMIN — PREGABALIN 300 MG: 150 CAPSULE ORAL at 17:05

## 2020-12-20 RX ADMIN — POTASSIUM CHLORIDE 20 MEQ: 20 TABLET, EXTENDED RELEASE ORAL at 17:05

## 2020-12-20 RX ADMIN — HYDROCODONE BITARTRATE AND ACETAMINOPHEN 1 TABLET: 5; 325 TABLET ORAL at 13:33

## 2020-12-20 RX ADMIN — PREGABALIN 300 MG: 150 CAPSULE ORAL at 10:05

## 2020-12-20 RX ADMIN — VENLAFAXINE HYDROCHLORIDE 150 MG: 150 CAPSULE, EXTENDED RELEASE ORAL at 10:06

## 2020-12-20 RX ADMIN — CLONAZEPAM 0.5 MG: 0.5 TABLET ORAL at 23:24

## 2020-12-20 RX ADMIN — HYDRALAZINE HYDROCHLORIDE 50 MG: 50 TABLET, FILM COATED ORAL at 10:05

## 2020-12-20 RX ADMIN — Medication 10 ML: at 13:33

## 2020-12-20 RX ADMIN — Medication 10 ML: at 05:33

## 2020-12-20 RX ADMIN — PANTOPRAZOLE SODIUM 40 MG: 40 TABLET, DELAYED RELEASE ORAL at 10:06

## 2020-12-20 RX ADMIN — ASPIRIN 81 MG CHEWABLE TABLET 81 MG: 81 TABLET CHEWABLE at 10:05

## 2020-12-20 RX ADMIN — ENOXAPARIN SODIUM 120 MG: 120 INJECTION SUBCUTANEOUS at 10:04

## 2020-12-20 RX ADMIN — METOPROLOL SUCCINATE 100 MG: 50 TABLET, EXTENDED RELEASE ORAL at 10:05

## 2020-12-20 RX ADMIN — DOXYCYCLINE 100 MG: 100 INJECTION, POWDER, LYOPHILIZED, FOR SOLUTION INTRAVENOUS at 23:21

## 2020-12-20 RX ADMIN — LISINOPRIL 20 MG: 20 TABLET ORAL at 10:06

## 2020-12-20 RX ADMIN — BUMETANIDE 1 MG: 1 TABLET ORAL at 10:05

## 2020-12-20 RX ADMIN — DOXYCYCLINE 100 MG: 100 INJECTION, POWDER, LYOPHILIZED, FOR SOLUTION INTRAVENOUS at 10:04

## 2020-12-20 RX ADMIN — Medication 10 ML: at 23:15

## 2020-12-20 RX ADMIN — Medication 1 TABLET: at 10:05

## 2020-12-20 RX ADMIN — FLUTICASONE FUROATE AND VILANTEROL TRIFENATATE 1 PUFF: 200; 25 POWDER RESPIRATORY (INHALATION) at 07:57

## 2020-12-20 RX ADMIN — ISOSORBIDE MONONITRATE 60 MG: 30 TABLET, EXTENDED RELEASE ORAL at 10:06

## 2020-12-20 RX ADMIN — ATORVASTATIN CALCIUM 40 MG: 40 TABLET, FILM COATED ORAL at 21:01

## 2020-12-20 RX ADMIN — HYDROCODONE BITARTRATE AND ACETAMINOPHEN 1 TABLET: 5; 325 TABLET ORAL at 21:13

## 2020-12-20 RX ADMIN — CALCIUM 500 MG: 500 TABLET ORAL at 10:05

## 2020-12-20 RX ADMIN — POTASSIUM CHLORIDE 20 MEQ: 20 TABLET, EXTENDED RELEASE ORAL at 21:01

## 2020-12-20 NOTE — PROGRESS NOTES
End of Shift Note    Bedside shift change report given to matias (oncoming nurse) by Jericho Jasmine (offgoing nurse). Report included the following information SBAR, Kardex, ED Summary, Intake/Output, MAR and Recent Results    Shift worked:  [id number]     Shift summary and any significant changes:     patient up in chair most of shift. Patient ambulated to bathroom. Prn given for back pain. Concerns for physician to address:  none     Zone phone for oncoming shift:   n/a       Activity:  Activity Level: Up with Assistance  Number times ambulated in hallways past shift: 0  Number of times OOB to chair past shift: 4    Cardiac:   Cardiac Monitoring: No      Cardiac Rhythm: Paced    Access:   Current line(s): PIV     Genitourinary:   Urinary status: voiding    Respiratory:   O2 Device: Room air  Chronic home O2 use?: NO  Incentive spirometer at bedside: YES     GI:  Last Bowel Movement Date: 12/18/20  Current diet:  DIET DIABETIC CONSISTENT CARB Regular; FR 1000ML  DIET NPO  Passing flatus: YES  Tolerating current diet: YES       Pain Management:   Patient states pain is manageable on current regimen: YES    Skin:  Preet Score: 19  Interventions: increase time out of bed    Patient Safety:  Fall Score:  Total Score: 3  Interventions: pt to call before getting OOB       Length of Stay:  Expected LOS: - - -  Actual LOS: [address]

## 2020-12-20 NOTE — PROGRESS NOTES
I reviewed pertinent labs and imaging, and discussed /agreed on the plan of care with Dr. Martinez [facility]      Hospitalist Progress Note    NAME: Sahil Baptiste   :  1950   MRN:  [id number]       Assessment / Plan:  Hypokalemia -   - improved from 2.9 to 3.6  - cont daily  Potassium supplement   - cont to monitor    Worsening Back Pain with LE Weakness  Spinal Stenosis   ·    Back brace brought in by  , pt oob to recliner today with 2 person assist   · Pain improving   And manageable   · Pain limiting pt ability to participate in PT . Enc pt to use pain medication PRN   ·   · CT spine/lumbar  - \"Acute superior endplate fracture of L1 with Route 15% height loss. Multilevel degenerative changes with bilateral neural foraminal narrowing at the L4-5 and L5-S1 levels. \"  · Appreciate Neurosurgery input - not a surgical candidate, Continue tramadol and cyclobenzaprine for pain added Norco   · Cont to  hold Pradaxa in anticipation of kyphoplasty in on Monday   · Pt unable to get MRI  Due to pacemaker scheduled for whole body scan in am   · Scheduled for Kypho in the afternoon   · Will hold Lovenox evening dose  , NPO after MN       Acute on Chronic Respiratory Failure with Hypoxia related to Acute on Chronic Systolic and Diastolic Congestive Heart Failure O2 82%, Temp 100.6 on admission  · Rapid and PCR COVID results NEGATIVE  · CXR  - Without acute infiltrate or congestion   · CTA Chest - \"Small bilateral pleural effusions with overlying atelectasis. No pulmonary embolus or other acute cardiopulmonary process. \"    · ECHO 2020 - EF 35-40%. Dilated left ventricle. Mild concentric hypertrophy. Severe systolic function. Left ventricular diastolic function.   Appreciate  Cardiology - no plans for any invasive cardiac work up ,not grossly overloaded  · BNP  2724 improved to 1334  · O2 weaned to room air sat 92-94%  · Cont  bumex Po  Will decrease to 1 mg    · Cr sl up 1.64 yesterday now 1.58  · Continue BB, imdur and lisinopril   · Strict I&Os  Daily Weights - 263 lbs to day admission wt 287  · 1L Fluid Restriction  · Appreciate Cardiology input     Pulmonary Hypertension   Coronary Artery Disease s/p PCI   Ischemic Cardiomyopathy   S/p BiV ICD 2018  Chronic Atrial Fibrillation - V paced currently   Hyperlipidemia   Hypertension   · Continue ASA, atorvastatin, pradaxa, hydralazine, imdur, metoprolol, lisinopril  · -140 improved   · Monitor BP -       Acute on Chronic LLE Cellulitis Temp 100.6 on admission  Chronic Venous Stasis of BLE   History of Chronic Diabetic Ulcer of Left Foot - healed   · Reports chronic LE cellulitis x 5 years  · LLE with erythema and venous stasis ulcer   · Completed keflex still no improvement ,will add doxy IV x 5 days   · Erythema extending towards knee open areas noted and weeping  · Wound culture  Occ GNR and GPR in pairs preliminary adjust Abx as indicated   · cont Wound care -daily      Chronic Kidney Disease Stage III Baseline 1.5 - resolved   · Cr at baseline, Cr 1.58 today   · decrease Bumex to 1 mg   · Follow BMP   · Avoid Nephrotoxic Medications      Uncontrolled Type II Diabetes with Diabetic Neuropathy   Morbid Obesity BMI 42  · SSI   · Continue pregabalin    · Cont to Hold PTA glimepiride   · Last HgbA1c 8.4 (8/2020) - now at 6.0   · BS less than 200- controlled      Anemia of Chronic Disease Stable, monitor   Asthma Continue albuterol inhaler, breo-ellipta   GERD Continue protonix   Anxiety/Depression Continue venlafaxine-SR, clonazepam           / Body mass index is 39.52 kg/m². Code status: Full  Prophylaxis: Pradaxa  Recommended Disposition: Home w/Family     Subjective:     Chief Complaint / Reason for Physician Visit  \"my leg hurts    \". Discussed with RN events overnight. Pt seen this morning oob in recliner , c/o pain in L leg .  States swelling and pain from  cellulitis  ,     Review of Systems:  Symptom Y/N Comments  Symptom Y/N Comments   Fever/Chills n Chest Pain n    Poor Appetite n   Edema n    Cough n   Abdominal Pain n    Sputum n   Joint Pain n    SOB/STEEN n   Pruritis/Rash y    Nausea/vomit    Tolerating PT/OT     Diarrhea n   Tolerating Diet n    Constipation    Other       Could NOT obtain due to:      Objective:     VITALS:   Last 24hrs VS reviewed since prior progress note. Most recent are:  Patient Vitals for the past 24 hrs:   Temp Pulse Resp BP SpO2   12/20/20 0734 97.4 °F (36.3 °C) 79 16 (!) 154/80 96 %   12/19/20 2330 98.1 °F (36.7 °C) 81 16 (!) 156/81 95 %   12/19/20 1953 97.7 °F (36.5 °C) 81 18 (!) 142/88 95 %   12/19/20 1513 98.2 °F (36.8 °C) 77 18 138/80 93 %     No intake or output data in the 24 hours ending 12/20/20 1016     I had a face to face encounter and independently examined this patient on 12/20/2020, as outlined below:  PHYSICAL EXAM:  General: Obese . Alert, cooperative, no acute distress    EENT:  EOMI. Anicteric sclerae. MMM   Resp:    no wheezing or rales. No accessory muscle use,  RA , CPAP at night   CV:  S1S2  Valentino Le 1+  Edema nl cap refill   GI:  Soft, Non distended, Non tender. +Bowel sounds + flatus   Neurologic:  Alert and oriented X 3, normal speech,   Psych:   . Not anxious nor agitated, occasional tearful   Skin:  No rashes. No jaundice, venous stasis ulcers on L leg ,  erythema to L shin extending superior to ulcer     Reviewed most current lab test results and cultures  YES  Reviewed most current radiology test results   YES  Review and summation of old records today    NO  Reviewed patient's current orders and MAR    YES  PMH/SH reviewed - no change compared to H&P  ________________________________________________________________________  Care Plan discussed with:    Comments   Patient x    Family      RN x    Care Manager     Consultant                       x Multidiciplinary team rounds were held today with , nursing, pharmacist and clinical coordinator.   Patient's plan of care was discussed; medications were reviewed and discharge planning was addressed. ________________________________________________________________________  Total NON critical care TIME:  30  Minutes    Total CRITICAL CARE TIME Spent:   Minutes non procedure based      Comments   >50% of visit spent in counseling and coordination of care     ________________________________________________________________________  Abad Diazing. Kyaw Darby NP     Procedures: see electronic medical records for all procedures/Xrays and details which were not copied into this note but were reviewed prior to creation of Plan. LABS:  I reviewed today's most current labs and imaging studies. Pertinent labs include:  Recent Labs     12/20/20  0456 12/19/20  0334 12/18/20  0403   WBC 5.3 6.4 6.9   HGB 9.6* 9.7* 9.4*   HCT 34.0* 34.2* 32.3*    199 178     Recent Labs     12/20/20  0456 12/19/20  0334 12/18/20  0403    139 140   K 3.6 3.2* 3.7   * 106 109*   CO2 28 30 26   * 207* 144*   BUN 26* 23* 24*   CREA 1.58* 1.60* 1.37*   CA 9.5 9.3 9.1       Signed: Ana Darby, AMANDA

## 2020-12-20 NOTE — PROGRESS NOTES
End of Shift Note    Bedside shift change report given to Shraddha (oncoming nurse) by Osiel Hare RN (offgoing nurse). Report included the following information SBAR, Kardex and MAR    Shift worked:  7p-7a     Shift summary and any significant changes:     Pt stable through shift, meds given according to mar, prn pain med given, linen changed, new iv put in      Concerns for physician to address:       Zone phone for oncoming shift:          Activity:  Activity Level: Chair  Number times ambulated in hallways past shift: 0  Number of times OOB to chair past shift: 0    Cardiac:   Cardiac Monitoring: No      Cardiac Rhythm: Paced    Access:   Current line(s): PIV     Genitourinary:   Urinary status: voiding    Respiratory:   O2 Device: Room air  Chronic home O2 use?: NO  Incentive spirometer at bedside: YES     GI:  Last Bowel Movement Date: 12/18/20  Current diet:  DIET DIABETIC CONSISTENT CARB Regular; FR 1000ML  Passing flatus: YES  Tolerating current diet: YES       Pain Management:   Patient states pain is manageable on current regimen: YES    Skin:  Preet Score: 19  Interventions: turn team and increase time out of bed    Patient Safety:  Fall Score:  Total Score: 3  Interventions: bed/chair alarm, gripper socks and pt to call before getting OOB       Length of Stay:  Expected LOS: - - -  [facility]

## 2020-12-21 ENCOUNTER — APPOINTMENT (OUTPATIENT)
Dept: NUCLEAR MEDICINE | Age: 70
DRG: 515 | End: 2020-12-21
Attending: NURSE PRACTITIONER
Payer: MEDICARE

## 2020-12-21 ENCOUNTER — HOSPITAL ENCOUNTER (OUTPATIENT)
Dept: NUCLEAR MEDICINE | Age: 70
Discharge: HOME | End: 2020-12-21
Attending: NURSE PRACTITIONER

## 2020-12-21 LAB
ERYTHROCYTE [DISTWIDTH] IN BLOOD BY AUTOMATED COUNT: 19.8 % (ref 11.5–14.5)
GLUCOSE BLD STRIP.AUTO-MCNC: 104 MG/DL (ref 65–100)
GLUCOSE BLD STRIP.AUTO-MCNC: 123 MG/DL (ref 65–100)
GLUCOSE BLD STRIP.AUTO-MCNC: 124 MG/DL (ref 65–100)
GLUCOSE BLD STRIP.AUTO-MCNC: 182 MG/DL (ref 65–100)
HCT VFR BLD AUTO: 37.9 % (ref 35–47)
HGB BLD-MCNC: 9.6 G/DL (ref 11.5–16)
MCH RBC QN AUTO: 23.6 PG (ref 26–34)
MCHC RBC AUTO-ENTMCNC: 25.3 G/DL (ref 30–36.5)
MCV RBC AUTO: 93.3 FL (ref 80–99)
NRBC # BLD: 0.02 K/UL (ref 0–0.01)
NRBC BLD-RTO: 0.4 PER 100 WBC
PLATELET # BLD AUTO: 169 K/UL (ref 150–400)
PMV BLD AUTO: 11.9 FL (ref 8.9–12.9)
RBC # BLD AUTO: 4.06 M/UL (ref 3.8–5.2)
SERVICE CMNT-IMP: ABNORMAL
WBC # BLD AUTO: 5.1 K/UL (ref 3.6–11)

## 2020-12-21 PROCEDURE — 74011250636 HC RX REV CODE- 250/636: Performed by: NURSE PRACTITIONER

## 2020-12-21 PROCEDURE — 74011250637 HC RX REV CODE- 250/637: Performed by: STUDENT IN AN ORGANIZED HEALTH CARE EDUCATION/TRAINING PROGRAM

## 2020-12-21 PROCEDURE — 74011250637 HC RX REV CODE- 250/637: Performed by: NURSE PRACTITIONER

## 2020-12-21 PROCEDURE — 82962 GLUCOSE BLOOD TEST: CPT

## 2020-12-21 PROCEDURE — 77030038269 HC DRN EXT URIN PURWCK BARD -A

## 2020-12-21 PROCEDURE — 85027 COMPLETE CBC AUTOMATED: CPT

## 2020-12-21 PROCEDURE — 36415 COLL VENOUS BLD VENIPUNCTURE: CPT

## 2020-12-21 PROCEDURE — 74011000258 HC RX REV CODE- 258: Performed by: NURSE PRACTITIONER

## 2020-12-21 PROCEDURE — 2709999900 HC NON-CHARGEABLE SUPPLY

## 2020-12-21 PROCEDURE — 65270000029 HC RM PRIVATE

## 2020-12-21 PROCEDURE — 94640 AIRWAY INHALATION TREATMENT: CPT

## 2020-12-21 PROCEDURE — A9503 TC99M MEDRONATE: HCPCS

## 2020-12-21 PROCEDURE — 74011250637 HC RX REV CODE- 250/637: Performed by: INTERNAL MEDICINE

## 2020-12-21 RX ADMIN — DOXYCYCLINE 100 MG: 100 INJECTION, POWDER, LYOPHILIZED, FOR SOLUTION INTRAVENOUS at 11:30

## 2020-12-21 RX ADMIN — HYDRALAZINE HYDROCHLORIDE 50 MG: 50 TABLET, FILM COATED ORAL at 13:25

## 2020-12-21 RX ADMIN — TRAMADOL HYDROCHLORIDE 50 MG: 50 TABLET, COATED ORAL at 13:26

## 2020-12-21 RX ADMIN — ISOSORBIDE MONONITRATE 60 MG: 30 TABLET, EXTENDED RELEASE ORAL at 13:25

## 2020-12-21 RX ADMIN — METOPROLOL SUCCINATE 100 MG: 50 TABLET, EXTENDED RELEASE ORAL at 13:25

## 2020-12-21 RX ADMIN — HYDROCODONE BITARTRATE AND ACETAMINOPHEN 1 TABLET: 5; 325 TABLET ORAL at 23:23

## 2020-12-21 RX ADMIN — POTASSIUM CHLORIDE 20 MEQ: 20 TABLET, EXTENDED RELEASE ORAL at 11:27

## 2020-12-21 RX ADMIN — PREGABALIN 300 MG: 150 CAPSULE ORAL at 17:02

## 2020-12-21 RX ADMIN — Medication 10 ML: at 06:52

## 2020-12-21 RX ADMIN — Medication 10 ML: at 13:26

## 2020-12-21 RX ADMIN — HYDROCODONE BITARTRATE AND ACETAMINOPHEN 1 TABLET: 5; 325 TABLET ORAL at 17:02

## 2020-12-21 RX ADMIN — HYDRALAZINE HYDROCHLORIDE 50 MG: 50 TABLET, FILM COATED ORAL at 17:02

## 2020-12-21 RX ADMIN — HYDROCODONE BITARTRATE AND ACETAMINOPHEN 1 TABLET: 5; 325 TABLET ORAL at 11:27

## 2020-12-21 RX ADMIN — DOXYCYCLINE 100 MG: 100 INJECTION, POWDER, LYOPHILIZED, FOR SOLUTION INTRAVENOUS at 21:42

## 2020-12-21 RX ADMIN — ATORVASTATIN CALCIUM 40 MG: 40 TABLET, FILM COATED ORAL at 21:43

## 2020-12-21 RX ADMIN — LISINOPRIL 20 MG: 20 TABLET ORAL at 13:25

## 2020-12-21 RX ADMIN — CLONAZEPAM 0.5 MG: 0.5 TABLET ORAL at 21:43

## 2020-12-21 RX ADMIN — FLUTICASONE FUROATE AND VILANTEROL TRIFENATATE 1 PUFF: 200; 25 POWDER RESPIRATORY (INHALATION) at 08:41

## 2020-12-21 RX ADMIN — ASPIRIN 81 MG CHEWABLE TABLET 81 MG: 81 TABLET CHEWABLE at 13:25

## 2020-12-21 RX ADMIN — PREGABALIN 300 MG: 150 CAPSULE ORAL at 13:25

## 2020-12-21 RX ADMIN — Medication 10 ML: at 21:44

## 2020-12-21 RX ADMIN — Medication 1 TABLET: at 11:26

## 2020-12-21 RX ADMIN — POTASSIUM CHLORIDE 20 MEQ: 20 TABLET, EXTENDED RELEASE ORAL at 17:02

## 2020-12-21 RX ADMIN — CYCLOBENZAPRINE 10 MG: 10 TABLET, FILM COATED ORAL at 14:12

## 2020-12-21 RX ADMIN — POTASSIUM CHLORIDE 20 MEQ: 20 TABLET, EXTENDED RELEASE ORAL at 21:43

## 2020-12-21 RX ADMIN — CYCLOBENZAPRINE 10 MG: 10 TABLET, FILM COATED ORAL at 23:24

## 2020-12-21 RX ADMIN — VENLAFAXINE HYDROCHLORIDE 150 MG: 150 CAPSULE, EXTENDED RELEASE ORAL at 13:25

## 2020-12-21 RX ADMIN — CALCIUM 500 MG: 500 TABLET ORAL at 11:27

## 2020-12-21 RX ADMIN — BUMETANIDE 1 MG: 1 TABLET ORAL at 13:25

## 2020-12-21 NOTE — PROGRESS NOTES
Patient sleeping. Attempt to awaken patient to inform her that she needchlorhexidine bath and blood work for procedure today. Patient seem confused and lethargic and refused to have anything done. Patient will not hold her arm down for blood work and kept pulling away. Unable to give patient a bath due to patient holding tight  on her blanket and will not let go. Patient kept stating NO, No!   Was able to do blood work but not the bath.      0720  Patient awake alert and orient x4. patient answerers all questions appropriately. Informed her that we need to bathe her with special wipes. Patient agreed was calm and cooperative. Bath complete. Left leg dressing change complete.

## 2020-12-21 NOTE — PROGRESS NOTES
I reviewed pertinent labs and imaging, and discussed /agreed on the plan of care with Dr. Andi Jaramillo      Hospitalist Progress Note    NAME: Flor Wick   :  1950   MRN:  [id number]       Assessment / Plan:  Hypokalemia - resolved  - improved from 2.9 to 3.6  - cont daily  Potassium supplement   - cont to monitor    Worsening Back Pain with LE Weakness  Spinal Stenosis   ·    Back brace brought in by  , pt oob to recliner today with 2 person assist   · Pain improving   And manageable   · Pain limiting pt ability to participate in PT . Enc pt to use pain medication PRN   ·   · CT spine/lumbar  - \"Acute superior endplate fracture of L1 with Route 15% height loss. Multilevel degenerative changes with bilateral neural foraminal narrowing at the L4-5 and L5-S1 levels. \"  · Appreciate Neurosurgery input - not a surgical candidate, Continue tramadol and cyclobenzaprine for pain added Norco   ·   Whole body scan done   IMPRESSION  IMPRESSION:   1. Acute L1 compression fracture. 2. No pattern of skeletal metastases. · Continue to  hold Lovenox procedure rescheduled for am       Acute on Chronic Respiratory Failure with Hypoxia related to Acute on Chronic Systolic and Diastolic Congestive Heart Failure O2 82%, Temp 100.6 on admission  · Rapid and PCR COVID results NEGATIVE  · CXR  - Without acute infiltrate or congestion   · CTA Chest - \"Small bilateral pleural effusions with overlying atelectasis. No pulmonary embolus or other acute cardiopulmonary process. \"    · ECHO 2020 - EF 35-40%. Dilated left ventricle. Mild concentric hypertrophy. Severe systolic function. Left ventricular diastolic function.   Appreciate  Cardiology - no plans for any invasive cardiac work up ,not grossly overloaded    · BNP  2724 improved to 1334  · O2 weaned to room air sat 92-94%  · Cont  bumex Po  Will decrease to 1 mg    · Cr sl up 1.64 yesterday now 1.58  · Continue BB, imdur and lisinopril   · Strict I&Os  Daily Weights - 267 lbs to day increase in 4 lbs   · 1L Fluid Restriction  · Appreciate Cardiology input     Pulmonary Hypertension   Coronary Artery Disease s/p PCI   Ischemic Cardiomyopathy   S/p BiV ICD 2018  Chronic Atrial Fibrillation - V paced currently   Hyperlipidemia   Hypertension   · Continue ASA, atorvastatin, pradaxa, hydralazine, imdur, metoprolol, lisinopril  · -140 improved   · Monitor BP -       Acute on Chronic LLE Cellulitis Temp 100.6 on admission  Chronic Venous Stasis of BLE   History of Chronic Diabetic Ulcer of Left Foot - healed   · Reports chronic LE cellulitis x 5 years  · LLE with erythema and venous stasis ulcer   · Completed keflex still no improvement ,will add doxy IV x 5 days   · Wound culture  Heavy psudomonas and MRSA sensitivity pending  · Cellulitis improved today  erythema resolved adjust abx as needed      Chronic Kidney Disease Stage III Baseline 1.5 - resolved   · Cr at baseline, Cr 1.58 today   · decrease Bumex to 1 mg   · Follow BMP   · Avoid Nephrotoxic Medications      Uncontrolled Type II Diabetes with Diabetic Neuropathy   Morbid Obesity BMI 42  · SSI   · Continue pregabalin    · Cont to Hold PTA glimepiride   · Last HgbA1c 8.4 (8/2020) - now at 6.0   · BS less than 200- controlled      Anemia of Chronic Disease Stable, monitor   Asthma Continue albuterol inhaler, breo-ellipta   GERD Continue protonix   Anxiety/Depression Continue venlafaxine-SR, clonazepam           / Body mass index is 39.56 kg/m². Code status: Full  Prophylaxis: Pradaxa  Recommended Disposition: Home w/Family     Subjective:     Chief Complaint / Reason for Physician Visit  \"my back hurts really abd   \". Discussed with RN events overnight. Pt seen this morning in bed awaiting body scan .     Review of Systems:  Symptom Y/N Comments  Symptom Y/N Comments   Fever/Chills n   Chest Pain n    Poor Appetite n   Edema n    Cough n   Abdominal Pain n    Sputum n   Joint Pain n    SOB/STEEN n Pruritis/Rash y    Nausea/vomit    Tolerating PT/OT     Diarrhea n   Tolerating Diet n    Constipation    Other       Could NOT obtain due to:      Objective:     VITALS:   Last 24hrs VS reviewed since prior progress note. Most recent are:  Patient Vitals for the past 24 hrs:   Temp Pulse Resp BP SpO2   12/21/20 1508 98 °F (36.7 °C) 80 16 (!) 149/72 95 %   12/21/20 1325  80  (!) 176/83    12/21/20 0942 97.8 °F (36.6 °C) 80 16 (!) 141/81 97 %   12/21/20 0842     95 %   12/21/20 0405 97.4 °F (36.3 °C) 80 16 (!) 147/79 94 %   12/20/20 2322 98.1 °F (36.7 °C) 82 18 (!) 143/79 92 %   12/20/20 1943 97.7 °F (36.5 °C) 80 16 105/72 95 %       Intake/Output Summary (Last 24 hours) at 12/21/2020 1528  Last data filed at 12/21/2020 1509  Gross per 24 hour   Intake 0 ml   Output 2050 ml   Net -2050 ml        I had a face to face encounter and independently examined this patient on 12/21/2020, as outlined below:  PHYSICAL EXAM:  General: Obese . Alert, cooperative, no acute distress    EENT:  EOMI. Anicteric sclerae. MMM   Resp:    no wheezing or rales. No accessory muscle use,  RA , CPAP at night   CV:  S1S2  Valentino Le 1+  Edema nl cap refill   GI:  Soft, Non distended, Non tender. +Bowel sounds + flatus   Neurologic:  Alert and oriented X 3, normal speech,   Psych:   . Not anxious nor agitated, occasional tearful   Skin:  No rashes.   No jaundice, venous stasis ulcers on L leg ,  erythema to L shin extending superior to ulcer     Reviewed most current lab test results and cultures  YES  Reviewed most current radiology test results   YES  Review and summation of old records today    NO  Reviewed patient's current orders and MAR    YES  PMH/SH reviewed - no change compared to H&P  ________________________________________________________________________  Care Plan discussed with:    Comments   Patient x    Family      RN x    Care Manager     Consultant                       x Multidiciplinary team rounds were held today with , nursing, pharmacist and clinical coordinator. Patient's plan of care was discussed; medications were reviewed and discharge planning was addressed. ________________________________________________________________________  Total NON critical care TIME:  30  Minutes    Total CRITICAL CARE TIME Spent:   Minutes non procedure based      Comments   >50% of visit spent in counseling and coordination of care     ________________________________________________________________________  Chris Gibson. Dorian Lees NP     Procedures: see electronic medical records for all procedures/Xrays and details which were not copied into this note but were reviewed prior to creation of Plan. LABS:  I reviewed today's most current labs and imaging studies. Pertinent labs include:  Recent Labs     12/21/20  0612 12/20/20  0456 12/19/20  0334   WBC 5.1 5.3 6.4   HGB 9.6* 9.6* 9.7*   HCT 37.9 34.0* 34.2*    196 199     Recent Labs     12/20/20  0456 12/19/20  0334    139   K 3.6 3.2*   * 106   CO2 28 30   * 207*   BUN 26* 23*   CREA 1.58* 1.60*   CA 9.5 9.3       Signed: Ana Lees NP

## 2020-12-21 NOTE — PROGRESS NOTES
End of Shift Note    Bedside shift change report given to Demetrius Kim  (oncoming nurse) by Garth Kim RN (offgoing nurse). Report included the following information SBAR, Kardex, ED Summary, Intake/Output, MAR and Recent Results    Shift worked:  7p-7a     Shift summary and any significant changes:     Patient NPO after midnight. Note when patient asleep. Patient difficult to awaken and adamantly refuse any nurse care. Concerns for physician to address:  See above     Zone phone for oncoming shift:   none       Activity:  Activity Level: Chair  Number times ambulated in hallways past shift: 0  Number of times OOB to chair past shift: 1    Cardiac:   Cardiac Monitoring: No      Cardiac Rhythm: Paced    Access:   Current line(s): PIV     Genitourinary:   Urinary status: voiding and external catheter    Respiratory:   O2 Device: CPAP nasal  Chronic home O2 use?: NO  Incentive spirometer at bedside: NO     GI:  Last Bowel Movement Date: 12/20/20  Current diet:  DIET NPO  Passing flatus: YES  Tolerating current diet: YES       Pain Management:   Patient states pain is manageable on current regimen: YES    Skin:  Preet Score: 19  Interventions: turn team, float heels, increase time out of bed, PT/OT consult and internal/external urinary devices    Patient Safety:  Fall Score:  Total Score: 3  Interventions: bed/chair alarm, assistive device (walker, cane, etc), gripper socks, pt to call before getting OOB and stay with me (per policy)  High Fall Risk: Yes    Length of Stay:  Expected LOS: - - -  Actual LOS: 8      Afua Lyles RN

## 2020-12-21 NOTE — CONSULTS
g                       EP/ ARRHYTHMIA CONSULT requested by Dr. Elfreda Bamberger    Patient ID:  Patient: Wilfredo Molina  MRN: [id number]  Age: 78 y.o.  : 1950    Date of  Admission: 2020 11:19 AM   PCP:  Cindy Benedict MD   Usual cardiologist:  Alvin Quiroga MD    Assessment: 1. Chronic/permanent atrial fibrillation. 2. Remote [facility] BIV-ICD upgrade and AV node ablation in 10/2018, she is pacemaker-dependent. She has a retained disconnected/capped RV pacing lead--so MRI not advised based on this. 3. Acute on chronic combined heart failure. Medication adjusted here. 4. Chronic mixed ischemic/nonischemic cardiomyopathy EF 35-40% in 2020, old anterior infarct. 5. Hypertensive heart disease with heart failure and CKD stage 3.  6. Lower extremity venous insufficiency with venous ablation in . Left lower extremity ulcer. Treated for cellulitis here. 7. DM type 2 without chronic insulin. 8. Chronic anticoagulation with Pradaxa. 9. Back pain and decreased mobility due to compression fracture and anticipated kyphoplasty. Bone scan done in lieu of MRI. Per Dr. Arie Nowak note prior:  1) CAD (Prox LAD ISELA 2014 for NQWMI), Neg PET for ischemia 2018. Min CAD 2019.  2) CM: Mixed ischemic/tachycardia mediated CM 2014 (EF 20%); EF 45% 2017. Sorin Au was too expensive. *EF 15-20% 2018. EF 30% w/ minimal CAD at cath 2019. EF 20-25% (m-mod MR; mod TR) 2019. *EF 35-40% 2020 (admit for CHF: too much salt). 3) HTN,   4) AFIB: PAfib (RFA 2016 and 2017), Recurrent/persistent afib : AV node ablation 10/2018. *Chronic AC w/ pradaxa  5) DM   6) Dyslipidemia   7) CKD III: Aldactone stopped in . Cr 2 2018 (after becky): Cr 1.5 2018 & 2019; Cr 1.4/gfr 39 2020 (Dr. Pearson Brunner).   8) [facility] PPM 2014 for bradycardia while on therapy for AFib: changed to BiV ICD 10/2018.  9) nephrolithiasis  10) SHAYLA (on CPAP)   11) cholecystectomy 2018.  12) LE venous insufficiency:  B SFV ablation 3/2020. Continues to use compression stockings/wraps. Obesity: 240# 2014; 262# 9/2019. 270 to 281# 2020. 279# 8/2020; 281# 12/2020.     Plan:     1. Plan for interrogation of her BIV-ICD tomorrow. Will see if the multipoint pacing (MPP) algorithm can be enabled to get additional reduction in QRS duration. A shorter duration QRS would suggest more preexcitation of the left ventricle from pacing, may translate into better myocardial mechanics. 2. I don't think revising/repositioning the LV lead would be helpful. The LV lead is in a high lateral or anterolateral branch by CXR, should have decent separation between this and the RV lead. 3. OK with holding Pradaxa prior to her procedure. No cardiac reservation about proceeding with kyphoplasty as planned. 4. Agree with cardiology recs put forth by Dr. Samuel Squires. I'll check back tomorrow. [x]       High complexity decision making was performed in this patient    Agueda Ashton is a 78 y.o. female with a history of the above. Dr. Samuel Squires has been following for cardiology, has signed off the case. I was asked to see her (and in fact, ran into her  in the singh who expected that I'd be coming by) for CHF. She does have a BIV-ICD with good BIV pacing percentage (she's pacer dependent after an AV node ablation) so she's receiving CRT. She currently is breathing a little better. Mostly, it's her back pain and limited mobility that is problematic.       Past Medical History:   Diagnosis Date    Anxiety 1/22/2018    Arthritis     OA    Asthma     Diabetes ([address]     Essential hypertension     GERD (gastroesophageal reflux disease)     Hypercholesterolemia 1/22/2018    Hypertension     Ill-defined condition     diverticulitis    Long-term use of high-risk medication 1/22/2018    Neuropathy     Obesity (BMI 30-39.9) 4/30/2019    Other ill-defined conditions(799.89)     IBS, spinal stenosis    Plantar fasciitis     Psychiatric disorder     depression, anxiety    Sleep apnea     uses CPAP    Type 2 diabetes mellitus with diabetic neuropathy, without long-term current use of insulin ([facility] [address] 6/5/2016        Past Surgical History:   Procedure Laterality Date    CARDIAC SURG PROCEDURE UNLIST      stent    COLONOSCOPY N/A 3/14/2018    COLONOSCOPY performed by Manasa Gregory MD at [facility] ENDOSCOPY    HX APPENDECTOMY      HX CHOLECYSTECTOMY  11/15/2018    HX HYSTERECTOMY      HX PACEMAKER         Social History     Tobacco Use    Smoking status: Never Smoker    Smokeless tobacco: Never Used   Substance Use Topics    Alcohol use: No        Family History   Problem Relation Age of Onset    Arthritis-osteo Mother     Hypertension Mother     High Cholesterol Mother     Crohn's Disease Mother     Heart Disease Mother     Alcohol abuse Father     High Cholesterol Sister     Hypertension Sister     Thyroid Disease Sister     COPD Sister     High Cholesterol Brother     Hypertension Brother     COPD Brother     COPD Child     Inflammatory Bowel Dz Child         Allergies   Allergen Reactions    Sulfa (Sulfonamide Antibiotics) Swelling    Amoxicillin Swelling          Current Facility-Administered Medications   Medication Dose Route Frequency    doxycycline (VIBRAMYCIN) 100 mg in 0.9% sodium chloride (MBP/ADV) 100 mL MBP  100 mg IntraVENous Q12H    bumetanide (BUMEX) tablet 1 mg  1 mg Oral DAILY    albuterol (PROVENTIL VENTOLIN) nebulizer solution 2.5 mg  2.5 mg Nebulization Q6H PRN    hydrALAZINE (APRESOLINE) tablet 50 mg  50 mg Oral BID    HYDROcodone-acetaminophen (NORCO) 5-325 mg per tablet 1 Tab  1 Tab Oral Q6H PRN    potassium chloride (K-DUR, KLOR-CON) SR tablet 20 mEq  20 mEq Oral TID    aluminum-magnesium hydroxide (MAALOX) oral suspension 15 mL  15 mL Oral QID PRN    fluticasone-vilanterol (BREO ELLIPTA) 200mcg-25mcg/puff  1 Puff Inhalation DAILY    aspirin chewable tablet 81 mg  81 mg Oral DAILY    atorvastatin (LIPITOR) tablet 40 mg  40 mg Oral QHS    calcium carbonate (OS-YEFRI) tablet 500 mg [elemental]  500 mg Oral DAILY    cholecalciferol (VITAMIN D3) (400 Units /10 mcg) tablet 1 Tab  400 Units Oral DAILY    clonazePAM (KlonoPIN) tablet 0.5 mg  0.5 mg Oral QHS    cyclobenzaprine (FLEXERIL) tablet 10 mg  10 mg Oral TID PRN    [Held by provider] dabigatran etexilate (PRADAXA) capsule 150 mg  150 mg Oral BID    diphenhydrAMINE (BENADRYL) capsule 25 mg  25 mg Oral BID PRN    isosorbide mononitrate ER (IMDUR) tablet 60 mg  60 mg Oral DAILY    lisinopriL (PRINIVIL, ZESTRIL) tablet 20 mg  20 mg Oral DAILY    metoprolol succinate (TOPROL-XL) XL tablet 100 mg  100 mg Oral DAILY    pantoprazole (PROTONIX) tablet 40 mg  40 mg Oral ACB    pregabalin (LYRICA) capsule 300 mg  300 mg Oral BID    traMADoL (ULTRAM) tablet 50 mg  50 mg Oral DAILY PRN    venlafaxine-SR (EFFEXOR-XR) capsule 150 mg  150 mg Oral DAILY WITH BREAKFAST    sodium chloride (NS) flush 5-40 mL  5-40 mL IntraVENous Q8H    sodium chloride (NS) flush 5-40 mL  5-40 mL IntraVENous PRN    acetaminophen (TYLENOL) tablet 650 mg  650 mg Oral Q6H PRN    polyethylene glycol (MIRALAX) packet 17 g  17 g Oral DAILY PRN    promethazine (PHENERGAN) tablet 12.5 mg  12.5 mg Oral Q6H PRN    Or    ondansetron (ZOFRAN) injection 4 mg  4 mg IntraVENous Q6H PRN    insulin lispro (HUMALOG) injection   SubCUTAneous AC&HS    glucose chewable tablet 16 g  4 Tab Oral PRN    dextrose (D50W) injection syrg 12.5-25 g  12.5-25 g IntraVENous PRN    glucagon (GLUCAGEN) injection 1 mg  1 mg IntraMUSCular PRN       Review of Symptoms:  See HPI as well.   General: negative for fever, chills, sweats, weight loss   Eyes: negative for blurred vision, eye pain, loss of vision, diplopia   Ear Nose and Throat: negative for rhinorrhea, pharyngitis, otalgia, tinnitus, speech or swallowing difficulties   Respiratory: negative for cough, sputum production, wheezing, pleuritic pain   Cardiology: negative for chest pain, palpitations, orthopnea, PND, syncope   Gastrointestinal: negative for abdominal pain, N/V, dysphagia   Genitourinary: negative for frequency, urgency, dysuria   Muskuloskeletal : + back pain, negative for myalgia   Hematology: negative for easy bruising, bleeding   Dermatological: negative for rash, ulceration   Endocrine: negative for hot flashes or polydipsia   Neurological: negative for headache, dizziness, confusion, memory loss  Psychological: negative for depression, agitation       Objective:      Physical Exam:  Temp (24hrs), Av.8 °F (36.6 °C), Min:97.4 °F (36.3 °C), Max:98.1 °F (36.7 °C)    Patient Vitals for the past 8 hrs:   Pulse   20 1508 80   20 1325 80   20 0942 80    Patient Vitals for the past 8 hrs:   Resp   20 1508 16   20 0942 16    Patient Vitals for the past 8 hrs:   BP   20 1508 (!) 149/72   20 1325 (!) 176/83   20 0942 (!) 141/81          Intake/Output Summary (Last 24 hours) at 2020 1601  Last data filed at 2020 1509  Gross per 24 hour   Intake 0 ml   Output 2050 ml   Net -2050 ml       Nondiaphoretic, not in acute distress. Supple, no palpable thyromegaly. No scleral icterus, mucous membranes moist, conjuctivae pink, no xanthelasma. L chest BIV-ICD site OK. Unlabored, clear to auscultation bilaterally anteriorly, symmetric air movement. Regular PACED rhythm, no pericardial rub, knock, or gallop. +peripheral edema with lower extremity venostasis changes. Palpable radial pulses bilaterally. Abdomen obese, soft, nontender, nondistended. Extremities without cyanosis or clubbing. Muscle bulk normal for age. Skin warm and dry. No rashes or ulcers visible. No tremor. Awake and appropriate. CARDIOGRAPHICS and STUDIES, I reviewed:    Telemetry:  No events. ECG:  Afib with BIV pacing (RBBB morphology, superior axis,  msec).     Echo 2020:  Left Ventricle Dilated left ventricle. Mild concentric hypertrophy. Severe systolic dysfunction. The estimated ejection fraction is 35 - 40%. Visually measured ejection fraction. There is left ventricular diastolic dysfunction. Wall Scoring The following segments are akinetic: basal inferoseptal, basal inferior and mid anteroseptal.  The following segments are hypokinetic: basal anterior, basal anteroseptal, basal inferolateral, basal anterolateral, mid anterior, mid inferoseptal, mid inferior, mid inferolateral, mid anterolateral, apical anterior, apical septal, apical inferior, apical lateral and apex. Left Atrium Mildly dilated left atrium. Right Ventricle Not well visualized. Normal cavity size and global systolic function. Right Atrium Dilated right atrium. Pacemaker wire present in the right atrium. Aortic Valve No stenosis and no regurgitation. Aortic valve sclerosis. There is leaflet calcification. Aortic valve peak gradient is 14.5 mmHg. Aortic valve mean gradient is 9.3 mmHg. Aortic valve area is 1.4 cm2. Aortic valve peak velocity is 1.9 cm/s. Mitral Valve Normal valve structure and no stenosis. Mitral annular calcification. Trace regurgitation. Tricuspid Valve Normal valve structure and no stenosis. Mild to moderate regurgitation. Pulmonic Valve Normal valve structure, no stenosis and no regurgitation. Aorta Normal aortic root, ascending aortic, and aortic arch. Pulmonary Artery Pulmonary arterial systolic pressure (PASP) is 43 mmHg. Pulmonary hypertension found to be mild to moderate. IVC/Hepatic Veins Inferior vena cava not assessed. Inferior vena cava not well visualized. Pericardium Trivial pericardial effusion noted. Labs:  No results for input(s): CPK, CKMB, CKNDX, TROIQ in the last 72 hours.     No lab exists for component: CPKMB  Lab Results   Component Value Date/Time    Cholesterol, total 123 08/10/2020 03:55 AM    HDL Cholesterol 56 08/10/2020 03:55 AM    LDL, calculated 53 08/10/2020 03:55 AM    Triglyceride 70 08/10/2020 03:55 AM    CHOL/HDL Ratio 2.2 08/10/2020 03:55 AM     No results for input(s): INR, PTP, APTT, INREXT in the last 72 hours. Recent Labs     12/21/20  0612 12/20/20  0456 12/19/20  0334   NA  --  142 139   K  --  3.6 3.2*   CL  --  110* 106   CO2  --  28 30   BUN  --  26* 23*   CREA  --  1.58* 1.60*   GLU  --  116* 207*   CA  --  9.5 9.3   WBC 5.1 5.3 6.4   HGB 9.6* 9.6* 9.7*   HCT 37.9 34.0* 34.2*    196 199     No results for input(s): AP, TBIL, TP, ALB, GLOB, GGT, AML, LPSE in the last 72 hours. No lab exists for component: SGOT, GPT, AMYP, HLPSE  No components found for: GLPOC  No results for input(s): PH, PCO2, PO2 in the last 72 hours.       Signed By: Odilia Esquivel MD     December 21, 2020

## 2020-12-21 NOTE — PROGRESS NOTES
Problem: Falls - Risk of  Goal: *Absence of Falls  Description: Document Edmonia Morning Fall Risk and appropriate interventions in the flowsheet.   Outcome: Progressing Towards Goal  Note: Fall Risk Interventions:  Mobility Interventions: Bed/chair exit alarm    Mentation Interventions: Bed/chair exit alarm    Medication Interventions: Bed/chair exit alarm    Elimination Interventions: Call light in reach              Problem: Patient Education: Go to Patient Education Activity  Goal: Patient/Family Education  Outcome: Progressing Towards Goal

## 2020-12-21 NOTE — PROGRESS NOTES
Physical Therapy    Treatment session deferred. Spoke with nurse who states pt is in significant pain and is scheduled for a kyphoplasty today. Will follow up tomorrow as appropriate after kyphoplasty.     Shaina Vo, PT

## 2020-12-21 NOTE — PROGRESS NOTES
Occupational Therapy    Patient chart reviewed up to date and spoke with RN. Per RN, patient with significant pain and discomfort today, kyphoplasty planned this afternoon. Per RN recommendation, will defer at this time and follow-up tomorrow after procedure. Thank you.   Ana Reddy, OTR/L

## 2020-12-22 ENCOUNTER — HOSPITAL ENCOUNTER (OUTPATIENT)
Dept: INTERVENTIONAL RADIOLOGY/VASCULAR | Age: 70
Discharge: HOME | DRG: 515 | End: 2020-12-22
Attending: NURSE PRACTITIONER
Payer: MEDICARE

## 2020-12-22 LAB
ANION GAP SERPL CALC-SCNC: 4 MMOL/L (ref 5–15)
BACTERIA SPEC CULT: ABNORMAL
BACTERIA SPEC CULT: ABNORMAL
BUN SERPL-MCNC: 31 MG/DL (ref 6–20)
BUN/CREAT SERPL: 19 (ref 12–20)
CALCIUM SERPL-MCNC: 9.2 MG/DL (ref 8.5–10.1)
CHLORIDE SERPL-SCNC: 111 MMOL/L (ref 97–108)
CO2 SERPL-SCNC: 27 MMOL/L (ref 21–32)
CREAT SERPL-MCNC: 1.66 MG/DL (ref 0.55–1.02)
ERYTHROCYTE [DISTWIDTH] IN BLOOD BY AUTOMATED COUNT: 19.7 % (ref 11.5–14.5)
GLUCOSE BLD STRIP.AUTO-MCNC: 102 MG/DL (ref 65–100)
GLUCOSE BLD STRIP.AUTO-MCNC: 152 MG/DL (ref 65–100)
GLUCOSE BLD STRIP.AUTO-MCNC: 154 MG/DL (ref 65–100)
GLUCOSE SERPL-MCNC: 144 MG/DL (ref 65–100)
GRAM STN SPEC: ABNORMAL
HCT VFR BLD AUTO: 35 % (ref 35–47)
HGB BLD-MCNC: 9.9 G/DL (ref 11.5–16)
MCH RBC QN AUTO: 24.4 PG (ref 26–34)
MCHC RBC AUTO-ENTMCNC: 28.3 G/DL (ref 30–36.5)
MCV RBC AUTO: 86.4 FL (ref 80–99)
NRBC # BLD: 0 K/UL (ref 0–0.01)
NRBC BLD-RTO: 0 PER 100 WBC
PLATELET # BLD AUTO: 184 K/UL (ref 150–400)
PMV BLD AUTO: 11.8 FL (ref 8.9–12.9)
POTASSIUM SERPL-SCNC: 4.1 MMOL/L (ref 3.5–5.1)
RBC # BLD AUTO: 4.05 M/UL (ref 3.8–5.2)
SERVICE CMNT-IMP: ABNORMAL
SODIUM SERPL-SCNC: 142 MMOL/L (ref 136–145)
WBC # BLD AUTO: 5.8 K/UL (ref 3.6–11)

## 2020-12-22 PROCEDURE — 74011636637 HC RX REV CODE- 636/637: Performed by: STUDENT IN AN ORGANIZED HEALTH CARE EDUCATION/TRAINING PROGRAM

## 2020-12-22 PROCEDURE — 74011250636 HC RX REV CODE- 250/636: Performed by: STUDENT IN AN ORGANIZED HEALTH CARE EDUCATION/TRAINING PROGRAM

## 2020-12-22 PROCEDURE — 0QU03JZ SUPPLEMENT LUMBAR VERTEBRA WITH SYNTHETIC SUBSTITUTE, PERCUTANEOUS APPROACH: ICD-10-PCS | Performed by: STUDENT IN AN ORGANIZED HEALTH CARE EDUCATION/TRAINING PROGRAM

## 2020-12-22 PROCEDURE — 74011250637 HC RX REV CODE- 250/637: Performed by: STUDENT IN AN ORGANIZED HEALTH CARE EDUCATION/TRAINING PROGRAM

## 2020-12-22 PROCEDURE — 74011000258 HC RX REV CODE- 258: Performed by: NURSE PRACTITIONER

## 2020-12-22 PROCEDURE — 93005 ELECTROCARDIOGRAM TRACING: CPT

## 2020-12-22 PROCEDURE — 82962 GLUCOSE BLOOD TEST: CPT

## 2020-12-22 PROCEDURE — 85027 COMPLETE CBC AUTOMATED: CPT

## 2020-12-22 PROCEDURE — 97535 SELF CARE MNGMENT TRAINING: CPT

## 2020-12-22 PROCEDURE — 80048 BASIC METABOLIC PNL TOTAL CA: CPT

## 2020-12-22 PROCEDURE — 74011000250 HC RX REV CODE- 250: Performed by: STUDENT IN AN ORGANIZED HEALTH CARE EDUCATION/TRAINING PROGRAM

## 2020-12-22 PROCEDURE — 77010033678 HC OXYGEN DAILY

## 2020-12-22 PROCEDURE — 36415 COLL VENOUS BLD VENIPUNCTURE: CPT

## 2020-12-22 PROCEDURE — 2709999900 HC NON-CHARGEABLE SUPPLY

## 2020-12-22 PROCEDURE — 77030003666 HC NDL SPINAL BD -A

## 2020-12-22 PROCEDURE — 74011250637 HC RX REV CODE- 250/637: Performed by: NURSE PRACTITIONER

## 2020-12-22 PROCEDURE — 74011250636 HC RX REV CODE- 250/636: Performed by: NURSE PRACTITIONER

## 2020-12-22 PROCEDURE — 77030038269 HC DRN EXT URIN PURWCK BARD -A

## 2020-12-22 PROCEDURE — 74011000636 HC RX REV CODE- 636: Performed by: STUDENT IN AN ORGANIZED HEALTH CARE EDUCATION/TRAINING PROGRAM

## 2020-12-22 PROCEDURE — C1713 ANCHOR/SCREW BN/BN,TIS/BN: HCPCS

## 2020-12-22 PROCEDURE — 22514 PERQ VERTEBRAL AUGMENTATION: CPT

## 2020-12-22 PROCEDURE — 77030040175 HC TAMP SPN BN INFLT KYPH MEDT -I1

## 2020-12-22 PROCEDURE — 94760 N-INVAS EAR/PLS OXIMETRY 1: CPT

## 2020-12-22 PROCEDURE — 65270000029 HC RM PRIVATE

## 2020-12-22 PROCEDURE — 0QS03ZZ REPOSITION LUMBAR VERTEBRA, PERCUTANEOUS APPROACH: ICD-10-PCS | Performed by: STUDENT IN AN ORGANIZED HEALTH CARE EDUCATION/TRAINING PROGRAM

## 2020-12-22 PROCEDURE — 74011250637 HC RX REV CODE- 250/637: Performed by: INTERNAL MEDICINE

## 2020-12-22 RX ORDER — FENTANYL CITRATE 50 UG/ML
200 INJECTION, SOLUTION INTRAMUSCULAR; INTRAVENOUS
Status: DISCONTINUED | OUTPATIENT
Start: 2020-12-22 | End: 2020-12-24 | Stop reason: HOSPADM

## 2020-12-22 RX ORDER — LEVOFLOXACIN 500 MG/1
500 TABLET, FILM COATED ORAL
Status: DISCONTINUED | OUTPATIENT
Start: 2020-12-22 | End: 2020-12-23

## 2020-12-22 RX ORDER — HEPARIN SODIUM 200 [USP'U]/100ML
400 INJECTION, SOLUTION INTRAVENOUS ONCE
Status: DISCONTINUED | OUTPATIENT
Start: 2020-12-22 | End: 2020-12-23 | Stop reason: HOSPADM

## 2020-12-22 RX ORDER — BUPIVACAINE HYDROCHLORIDE 5 MG/ML
30 INJECTION, SOLUTION EPIDURAL; INTRACAUDAL
Status: COMPLETED | OUTPATIENT
Start: 2020-12-22 | End: 2020-12-22

## 2020-12-22 RX ORDER — LIDOCAINE HYDROCHLORIDE 20 MG/ML
20 INJECTION, SOLUTION INFILTRATION; PERINEURAL ONCE
Status: COMPLETED | OUTPATIENT
Start: 2020-12-22 | End: 2020-12-22

## 2020-12-22 RX ORDER — MIDAZOLAM HYDROCHLORIDE 1 MG/ML
10 INJECTION, SOLUTION INTRAMUSCULAR; INTRAVENOUS
Status: DISCONTINUED | OUTPATIENT
Start: 2020-12-22 | End: 2020-12-24 | Stop reason: HOSPADM

## 2020-12-22 RX ORDER — CLINDAMYCIN PHOSPHATE 900 MG/50ML
900 INJECTION INTRAVENOUS ONCE
Status: COMPLETED | OUTPATIENT
Start: 2020-12-22 | End: 2020-12-22

## 2020-12-22 RX ADMIN — Medication 10 ML: at 07:01

## 2020-12-22 RX ADMIN — VENLAFAXINE HYDROCHLORIDE 150 MG: 150 CAPSULE, EXTENDED RELEASE ORAL at 08:39

## 2020-12-22 RX ADMIN — CLINDAMYCIN IN 5 PERCENT DEXTROSE 900 MG: 18 INJECTION, SOLUTION INTRAVENOUS at 14:30

## 2020-12-22 RX ADMIN — BUPIVACAINE HYDROCHLORIDE 150 MG: 5 INJECTION, SOLUTION EPIDURAL; INTRACAUDAL; PERINEURAL at 14:10

## 2020-12-22 RX ADMIN — LISINOPRIL 20 MG: 20 TABLET ORAL at 08:39

## 2020-12-22 RX ADMIN — BUMETANIDE 1 MG: 1 TABLET ORAL at 08:39

## 2020-12-22 RX ADMIN — MIDAZOLAM HYDROCHLORIDE 2 MG: 1 INJECTION, SOLUTION INTRAMUSCULAR; INTRAVENOUS at 14:41

## 2020-12-22 RX ADMIN — HYDRALAZINE HYDROCHLORIDE 50 MG: 50 TABLET, FILM COATED ORAL at 18:19

## 2020-12-22 RX ADMIN — PREGABALIN 300 MG: 150 CAPSULE ORAL at 18:19

## 2020-12-22 RX ADMIN — ISOSORBIDE MONONITRATE 60 MG: 30 TABLET, EXTENDED RELEASE ORAL at 08:38

## 2020-12-22 RX ADMIN — LIDOCAINE HYDROCHLORIDE 400 MG: 20 INJECTION, SOLUTION INFILTRATION; PERINEURAL at 14:10

## 2020-12-22 RX ADMIN — ATORVASTATIN CALCIUM 40 MG: 40 TABLET, FILM COATED ORAL at 22:40

## 2020-12-22 RX ADMIN — CLONAZEPAM 0.5 MG: 0.5 TABLET ORAL at 22:40

## 2020-12-22 RX ADMIN — MIDAZOLAM HYDROCHLORIDE 2 MG: 1 INJECTION, SOLUTION INTRAMUSCULAR; INTRAVENOUS at 14:45

## 2020-12-22 RX ADMIN — Medication 1 TABLET: at 08:39

## 2020-12-22 RX ADMIN — DOXYCYCLINE 100 MG: 100 INJECTION, POWDER, LYOPHILIZED, FOR SOLUTION INTRAVENOUS at 22:46

## 2020-12-22 RX ADMIN — CYCLOBENZAPRINE 10 MG: 10 TABLET, FILM COATED ORAL at 18:33

## 2020-12-22 RX ADMIN — LEVOFLOXACIN 500 MG: 500 TABLET, FILM COATED ORAL at 18:19

## 2020-12-22 RX ADMIN — ASPIRIN 81 MG CHEWABLE TABLET 81 MG: 81 TABLET CHEWABLE at 08:40

## 2020-12-22 RX ADMIN — IOPAMIDOL 10 ML: 612 INJECTION, SOLUTION INTRATHECAL at 14:10

## 2020-12-22 RX ADMIN — CYCLOBENZAPRINE 10 MG: 10 TABLET, FILM COATED ORAL at 08:37

## 2020-12-22 RX ADMIN — CALCIUM 500 MG: 500 TABLET ORAL at 08:38

## 2020-12-22 RX ADMIN — Medication 10 ML: at 06:59

## 2020-12-22 RX ADMIN — METOPROLOL SUCCINATE 100 MG: 50 TABLET, EXTENDED RELEASE ORAL at 08:38

## 2020-12-22 RX ADMIN — INSULIN LISPRO 2 UNITS: 100 INJECTION, SOLUTION INTRAVENOUS; SUBCUTANEOUS at 08:40

## 2020-12-22 RX ADMIN — HYDRALAZINE HYDROCHLORIDE 50 MG: 50 TABLET, FILM COATED ORAL at 08:37

## 2020-12-22 RX ADMIN — PANTOPRAZOLE SODIUM 40 MG: 40 TABLET, DELAYED RELEASE ORAL at 08:39

## 2020-12-22 RX ADMIN — POTASSIUM CHLORIDE 20 MEQ: 20 TABLET, EXTENDED RELEASE ORAL at 08:39

## 2020-12-22 RX ADMIN — Medication 10 ML: at 22:41

## 2020-12-22 RX ADMIN — FLUTICASONE FUROATE AND VILANTEROL TRIFENATATE 1 PUFF: 200; 25 POWDER RESPIRATORY (INHALATION) at 08:41

## 2020-12-22 RX ADMIN — FENTANYL CITRATE 50 MCG: 50 INJECTION, SOLUTION INTRAMUSCULAR; INTRAVENOUS at 14:43

## 2020-12-22 RX ADMIN — HYDROCODONE BITARTRATE AND ACETAMINOPHEN 1 TABLET: 5; 325 TABLET ORAL at 08:36

## 2020-12-22 RX ADMIN — POTASSIUM CHLORIDE 20 MEQ: 20 TABLET, EXTENDED RELEASE ORAL at 18:19

## 2020-12-22 RX ADMIN — Medication 10 ML: at 18:20

## 2020-12-22 RX ADMIN — PREGABALIN 300 MG: 150 CAPSULE ORAL at 08:37

## 2020-12-22 RX ADMIN — FENTANYL CITRATE 25 MCG: 50 INJECTION, SOLUTION INTRAMUSCULAR; INTRAVENOUS at 14:47

## 2020-12-22 RX ADMIN — POTASSIUM CHLORIDE 20 MEQ: 20 TABLET, EXTENDED RELEASE ORAL at 22:40

## 2020-12-22 RX ADMIN — HYDROCODONE BITARTRATE AND ACETAMINOPHEN 1 TABLET: 5; 325 TABLET ORAL at 18:33

## 2020-12-22 NOTE — PROGRESS NOTES
[facility] device checked. Multipoint pacing (MPP) algorithm enabled RV>LV1>LV2 with LV1 vector (M3-P4) and LV2 vector (D1-M2). Capture thresholds were good, no phrenic nerve stimulation. Changed mode from DDDR to VVIR due to permanent atrial fibrillation. See interrogation report, to be scanned. Will get an ECG to document the new QRS morphology and duration. I'll notify Dr. Darby Gonzales of this adjustment. Will sign off from an EP/Arrhythmia standpoint.

## 2020-12-22 NOTE — PROCEDURES
Interventional Radiology  Procedure Note        12/22/2020 3:00 PM    Patient: Mj Rodriguez     Informed consent obtained    Diagnosis: L1 compression fracture    Procedure(s): L1 kyphoplasty    Specimens removed:  none    Complications: None    Primary Physician: Sherrill Lisa MD    Recommendations: N/A    Discharge Disposition: stable; return to floor    Full dictated report to follow    Sherrill Lisa MD  Interventional Radiology  [facility]  3:00 PM, 12/22/2020

## 2020-12-22 NOTE — H&P
Radiology History and Physical    Patient: Agueda Ashton 78 y.o. female       Chief Complaint: Back Pain (10/10, history of back pain, acute exacerbation this morning, walking around home yesterday, unable to get out of bed this morning) and Flu Like Symptoms (pt with recent exposure to COVID positive son in law, fever for .9, afebrile at hospital, 82% on RA on arrival)      History of Present Illness: 78year old woman who had sudden onset of back pain upon waking recently. CT and nuclear bone scan confirm acute compression fracture of L1. Has baseline peripheral neuropathy with significant loss of sensation in the legs, although not severe weakness. Also has bilateral radiculopathy at baseline.     History:    Past Medical History:   Diagnosis Date    Anxiety 1/22/2018    Arthritis     OA    Asthma     Diabetes ([facility]     Essential hypertension     GERD (gastroesophageal reflux disease)     Hypercholesterolemia 1/22/2018    Hypertension     Ill-defined condition     diverticulitis    Long-term use of high-risk medication 1/22/2018    Neuropathy     Obesity (BMI 30-39.9) 4/30/2019    Other ill-defined conditions(799.89)     IBS, spinal stenosis    Plantar fasciitis     Psychiatric disorder     depression, anxiety    Sleep apnea     uses CPAP    Type 2 diabetes mellitus with diabetic neuropathy, without long-term current use of insulin ([facility] 6/5/2016     Family History   Problem Relation Age of Onset    Arthritis-osteo Mother     Hypertension Mother     High Cholesterol Mother     Crohn's Disease Mother     Heart Disease Mother     Alcohol abuse Father     High Cholesterol Sister     Hypertension Sister     Thyroid Disease Sister     COPD Sister     High Cholesterol Brother     Hypertension Brother     COPD Brother     COPD Child     Inflammatory Bowel Dz Child      Social History     Socioeconomic History    Marital status:      Spouse name: Not on file    Number of children: Not on file    Years of education: Not on file    Highest education level: Not on file   Occupational History    Not on file   Social Needs    Financial resource strain: Not on file    Food insecurity     Worry: Not on file     Inability: Not on file    Transportation needs     Medical: Not on file     Non-medical: Not on file   Tobacco Use    Smoking status: Never Smoker    Smokeless tobacco: Never Used   Substance and Sexual Activity    Alcohol use: No    Drug use: No    Sexual activity: Not on file   Lifestyle    Physical activity     Days per week: Not on file     Minutes per session: Not on file    Stress: Not on file   Relationships    Social connections     Talks on phone: Not on file     Gets together: Not on file     Attends Denominational service: Not on file     Active member of club or organization: Not on file     Attends meetings of clubs or organizations: Not on file     Relationship status: Not on file    Intimate partner violence     Fear of current or ex partner: Not on file     Emotionally abused: Not on file     Physically abused: Not on file     Forced sexual activity: Not on file   Other Topics Concern    Not on file   Social History Narrative    Not on file       Allergies:    Allergies   Allergen Reactions    Sulfa (Sulfonamide Antibiotics) Swelling    Amoxicillin Swelling       Current Medications:  Current Facility-Administered Medications   Medication Dose Route Frequency    clindamycin (CLEOCIN) 900mg D5W 50mL IVPB (premix)  900 mg IntraVENous ONCE    midazolam (PF) (VERSED) injection 10 mg  10 mg IntraVENous Multiple    fentaNYL citrate (PF) injection 200 mcg  200 mcg IntraVENous Multiple    bumetanide (BUMEX) tablet 1 mg  1 mg Oral DAILY    albuterol (PROVENTIL VENTOLIN) nebulizer solution 2.5 mg  2.5 mg Nebulization Q6H PRN    hydrALAZINE (APRESOLINE) tablet 50 mg  50 mg Oral BID    HYDROcodone-acetaminophen (NORCO) 5-325 mg per tablet 1 Tab  1 Tab Oral Q6H PRN    potassium chloride (K-DUR, KLOR-CON) SR tablet 20 mEq  20 mEq Oral TID    aluminum-magnesium hydroxide (MAALOX) oral suspension 15 mL  15 mL Oral QID PRN    fluticasone-vilanterol (BREO ELLIPTA) 200mcg-25mcg/puff  1 Puff Inhalation DAILY    aspirin chewable tablet 81 mg  81 mg Oral DAILY    atorvastatin (LIPITOR) tablet 40 mg  40 mg Oral QHS    calcium carbonate (OS-YEFRI) tablet 500 mg [elemental]  500 mg Oral DAILY    cholecalciferol (VITAMIN D3) (400 Units /10 mcg) tablet 1 Tab  400 Units Oral DAILY    clonazePAM (KlonoPIN) tablet 0.5 mg  0.5 mg Oral QHS    cyclobenzaprine (FLEXERIL) tablet 10 mg  10 mg Oral TID PRN    [Held by provider] dabigatran etexilate (PRADAXA) capsule 150 mg  150 mg Oral BID    diphenhydrAMINE (BENADRYL) capsule 25 mg  25 mg Oral BID PRN    isosorbide mononitrate ER (IMDUR) tablet 60 mg  60 mg Oral DAILY    lisinopriL (PRINIVIL, ZESTRIL) tablet 20 mg  20 mg Oral DAILY    metoprolol succinate (TOPROL-XL) XL tablet 100 mg  100 mg Oral DAILY    pantoprazole (PROTONIX) tablet 40 mg  40 mg Oral ACB    pregabalin (LYRICA) capsule 300 mg  300 mg Oral BID    traMADoL (ULTRAM) tablet 50 mg  50 mg Oral DAILY PRN    venlafaxine-SR (EFFEXOR-XR) capsule 150 mg  150 mg Oral DAILY WITH BREAKFAST    sodium chloride (NS) flush 5-40 mL  5-40 mL IntraVENous Q8H    sodium chloride (NS) flush 5-40 mL  5-40 mL IntraVENous PRN    acetaminophen (TYLENOL) tablet 650 mg  650 mg Oral Q6H PRN    polyethylene glycol (MIRALAX) packet 17 g  17 g Oral DAILY PRN    promethazine (PHENERGAN) tablet 12.5 mg  12.5 mg Oral Q6H PRN    Or    ondansetron (ZOFRAN) injection 4 mg  4 mg IntraVENous Q6H PRN    insulin lispro (HUMALOG) injection   SubCUTAneous AC&HS    glucose chewable tablet 16 g  4 Tab Oral PRN    dextrose (D50W) injection syrg 12.5-25 g  12.5-25 g IntraVENous PRN    glucagon (GLUCAGEN) injection 1 mg  1 mg IntraMUSCular PRN        Physical Exam:  Blood pressure 119/75, pulse 80, temperature 98.1 °F (36.7 °C), resp. rate 16, height 5' 9\" (1.753 m), weight 121 kg (266 lb 12.1 oz), SpO2 95 %, not currently breastfeeding. GENERAL: alert, cooperative, no distress, appears stated age,   LUNG: Nonlabored respiration on room air  HEART: regular rate and rhythm  Absent sensation in the feet bilaterally, but 5/5 strength both feet    Alerts:    Hospital Problems  Date Reviewed: 9/29/2020          Codes Class Noted POA    Obesity (BMI 30-39.9) (Chronic) ICD-10-CM: E66.9  ICD-9-CM: 278.00  4/30/2019 Yes        Ischemic cardiomyopathy (Chronic) ICD-10-CM: I25.5  ICD-9-CM: 414.8  11/13/2018 Yes        Stage 3 chronic kidney disease ICD-10-CM: N18.30  ICD-9-CM: 585.3  11/13/2018 Yes        Hypercholesterolemia (Chronic) ICD-10-CM: E78.00  ICD-9-CM: 272.0  1/22/2018 Yes        Lower back pain ICD-10-CM: M54.5  ICD-9-CM: 724.2  12/13/2020 Unknown        Hypoxia ICD-10-CM: R09.02  ICD-9-CM: 799.02  12/13/2020 Unknown        Type 2 diabetes mellitus with diabetic neuropathy, without long-term current use of insulin ([facility] (Chronic) ICD-10-CM: E11.40  ICD-9-CM: 250.60, 357.2  6/5/2016 Yes        Chronic atrial fibrillation ([facility] [address] (Chronic) ICD-10-CM: I48.20  ICD-9-CM: 427.31  6/5/2016 Yes        Systolic CHF, chronic ([facility] [address] (Chronic) ICD-10-CM: I50.22  ICD-9-CM: 428.22, 428.0  6/5/2016 Yes        Acute systolic heart failure ([facility] ICD-10-CM: I50.21  ICD-9-CM: 428.21  4/4/2014 Yes              Laboratory:      Recent Labs     12/22/20  0730 12/22/20  0424   HGB  --  9.9*   HCT  --  35.0   WBC  --  5.8   PLT  --  184   BUN 31*  --    CREA 1.66*  --    K 4.1  --          Plan of Care/Planned Procedure:  Risks, benefits, and alternatives reviewed with patient and she agrees to proceed with the procedure. L1 kyphoplasty    Deemed appropriate for moderate sedation with versed and fentanyl.     Fernando Sanchez MD  Interventional Radiology  [address] [facility]  2:00 PM, 12/22/2020

## 2020-12-22 NOTE — PROGRESS NOTES
VISHNU Plan:  Home with spouse  Spouse will transport home at d/c  2nd IMM letter  CM to secure PCP follow up appointment prior to d/c  PT/OT following     Chart reviewed. CM continues to follow pt for discharge planning. Pt underwent kyphoplasty today. Cardiology planning for interrogation of her BIV-ICD today as well. Pt not yet medically stable for discharge. Will await PT/OT recommendations s/p kyphoplasty and continue to follow.      COLTEN Oliver   Care Manager, [facility]  [phone]

## 2020-12-22 NOTE — ROUTINE PROCESS
End of Shift Note Bedside shift change report given to [facility] (oncoming nurse) by Denita Ceballos RN (offgoing nurse). Report included the following information SBAR, Kardex and ED Summary Shift worked:  7a-7p Shift summary and any significant changes:  
  Pt did not have kyphoplasty, plan for tomorrow. PT voiding well, pain improved by end of shift Concerns for physician to address:  None Zone phone for oncoming shift:   21 [phone] Activity: 
Activity Level: Up with Assistance Number times ambulated in hallways past shift: 0 Number of times OOB to chair past shift: 0 Cardiac:  
Cardiac Monitoring: No     
Cardiac Rhythm: Paced Access:  
Current line(s): PIV Genitourinary:  
Urinary status: external catheter Respiratory:  
O2 Device: Room air Chronic home O2 use?: NO Incentive spirometer at bedside: YES 
  
GI: 
Last Bowel Movement Date: 12/20/20 Current diet:  DIET NPO 
DIET DIABETIC CONSISTENT CARB Regular Passing flatus: YES Tolerating current diet: YES Pain Management:  
Patient states pain is manageable on current regimen: YES Skin: 
Preet Score: 18 Interventions: turn team 
 
Patient Safety: 
Fall Score: Total Score: 3 Interventions: bed/chair alarm High Fall Risk: Yes Length of Stay: 
Expected LOS: - - - Actual LOS: [id number], RN

## 2020-12-22 NOTE — PROGRESS NOTES
Pharmacy Automatic Renal Dosing Protocol - Antimicrobials    Indication for Antimicrobials: SSST     Current Regimen of Each Antimicrobial:  Doxycycline 100 mg q12h ( Day1 of 5)  Levofloxacin 750 mg q48h ( Day 1 of 7)    Previous Antimicrobial Therapy:      Goal Level:  (AUC: 400 - 600 mg/hr/Liter/day)     Date Dose & Interval Measured (mcg/mL) Extrapolated (mcg/mL)                       Date & time of next level:     Significant Cultures:    MRSA positive in Wound suceptible to Tetracycline       Radiology / Imaging results: (X-ray, CT scan or MRI):     Paralysis, amputations, malnutrition:     Labs:  Recent Labs     20  0730 20  0424 20  0612 20  0456   CREA 1.66*  --   --  1.58*   BUN 31*  --   --  26*   WBC  --  5.8 5.1 5.3     Temp (24hrs), Av.1 °F (36.7 °C), Min:97.9 °F (36.6 °C), Max:98.2 °F (36.8 °C)      Is the Patient on Dialysis? Creatinine Clearance (mL/min):   CrCl (Actual Body Weight): 62.0  CrCl (Adjusted Body Weight): 45.1  CrCl (Ideal Body Weight): 33.9    Impression/Plan:   Cont with same dose   Antimicrobial stop date 5 and 7 days      Pharmacy will follow daily and adjust medications as appropriate for renal function and/or serum levels.     Thank you,  Pérez Bloom, [facility]

## 2020-12-22 NOTE — ROUTINE PROCESS
Arrived into the xray recovery area via stretcher accompanied with a transporter. Here today for a kyphoplasty. Dr Tanner Trimble at bedside, + informed consent obtained.

## 2020-12-22 NOTE — ROUTINE PROCESS
Bedside and Verbal shift change report given to Susie Velásquez RN (oncoming nurse) by Collette Polo RN (offgoing nurse). Report included the following information SBAR, Kardex and Quality Measures.

## 2020-12-22 NOTE — PROGRESS NOTES
Problem: Falls - Risk of  Goal: *Absence of Falls  Description: Document Lemon Fito Fall Risk and appropriate interventions in the flowsheet. Outcome: Progressing Towards Goal  Note: Fall Risk Interventions:  Mobility Interventions: Bed/chair exit alarm    Mentation Interventions: Bed/chair exit alarm    Medication Interventions: Bed/chair exit alarm    Elimination Interventions: Bed/chair exit alarm              Problem: Patient Education: Go to Patient Education Activity  Goal: Patient/Family Education  Outcome: Progressing Towards Goal     Problem: Pressure Injury - Risk of  Goal: *Prevention of pressure injury  Description: Document Preet Scale and appropriate interventions in the flowsheet.   Outcome: Progressing Towards Goal  Note: Pressure Injury Interventions:  Sensory Interventions: Assess changes in LOC    Moisture Interventions: Absorbent underpads    Activity Interventions: Increase time out of bed    Mobility Interventions: Chair cushion    Nutrition Interventions: Document food/fluid/supplement intake    Friction and Shear Interventions: HOB 30 degrees or less

## 2020-12-22 NOTE — ROUTINE PROCESS
Name of procedure: Kyphoplasty- Lumbar one Sedation medications given: 4 mg Versed, 75 mcg Fentanyl Sedation tolerated: Well Fluids removed: N/A Samples sent to lab: N/A Any complications related to procedure: None Post Procedure Care Needed/order sets placed in [facility]. Yes Report called to Blanquita Sethi at ext. [phone]. SBAR items covered. Opportunity for questions provided.

## 2020-12-22 NOTE — PROGRESS NOTES
Resting comfortably post procedure. Able to drink sips of water, and move lower extremities with + sensation as well. Post care orders placed in chart. Will call report.

## 2020-12-22 NOTE — PROGRESS NOTES
Problem: Self Care Deficits Care Plan (Adult)  Goal: *Acute Goals and Plan of Care (Insert Text)  Description:   FUNCTIONAL STATUS PRIOR TO ADMISSION: Patient was modified independent using a rollator and spc for functional mobility. STATES RECENTLY STRUGGLING WITH DONNING SOCKS/SHOES. HOME SUPPORT: The patient lived with spouse but did not require assist.    Occupational Therapy Goals  Initiated 12/19/2020  1. Patient will perform grooming at sink with independence within 7 day(s). 2.  Patient will perform lower body dressing with modified independence within 7 day(s). 3.  Patient will perform bathing with modified independence within 7 day(s). 4.  Patient will perform toilet transfers with modified independence within 7 day(s). 5.  Patient will perform all aspects of toileting with independence within 7 day(s). Outcome: Progressing Towards Goal   OCCUPATIONAL THERAPY TREATMENT  Patient: Ysabel Lance (71 y.o. female)  Date: 12/22/2020  Diagnosis: Acute respiratory failure with hypoxemia ([facility] [address] [J96.01] <principal problem not specified>       Precautions: Fall, Contact, Spinal(kyphoplasty 12-22)  Chart, occupational therapy assessment, plan of care, and goals were reviewed. ASSESSMENT  Patient continues with skilled OT services and is progressing towards goals. Patient with pending kyphoplasty, still with terrible back pain with movement but willing to do training in bed. Presents as cog intact with supportive spouse present for tx; Receptive to preventative training of pseudo back precautions to maximize pain management and spine health post kyphoplasty today. Current Level of Function Impacting Discharge (ADLs): Max A-D overall self care limited by back pain    Other factors to consider for discharge: supportive spouse         PLAN :  Patient continues to benefit from skilled intervention to address the above impairments. Continue treatment per established plan of care.   to address goals. Recommend with staff: out of bed for all meals as tolerated, medicated PRN for back pain    Recommend next OT session: reassessment post kyphoplasty later today; MICHELLE AE training    Recommendation for discharge: (in order for the patient to meet his/her long term goals)  Occupational therapy at least 2 days/week in the home AND ensure assist and/or supervision for safety with self care and functional mobility    This discharge recommendation:  A follow-up discussion with the attending provider and/or case management is planned    IF patient discharges home will need the following DME: AE: long handled bathing, AE: long handled dressing, bedside commode, transfer bench, and walker: rolling       SUBJECTIVE:   Patient stated It just hurts when I move.     OBJECTIVE DATA SUMMARY:   Cognitive/Behavioral Status:  Neurologic State: Alert  Orientation Level: Oriented X4  Cognition: Appropriate decision making; Appropriate for age attention/concentration; Appropriate safety awareness; Follows commands  Perception: Appears intact  Perseveration: No perseveration noted  Safety/Judgement: Fall prevention; Insight into deficits    Functional Mobility and Transfers for ADLs:  Bed Mobility:  Rolling: Maximum assistance;Assist x1;Additional time  Supine to Sit: Maximum assistance; Additional time;Assist x1(limited by back pain; NPO)      ADL Intervention:  Feeding  Feeding Assistance: Modified independent(trained not bending and twisting with ADL/reaching tasks)    Grooming  Grooming Assistance: Set-up(training for grooming with spinal precautions)         Lower Body Bathing  Bathing Assistance: Total assistance(dependent)  Perineal  : Total assistance (dependent)  Lower Body : Total assistance (dependent)  Position Performed: Supine(will benefit from )         Lower Body Dressing Assistance  Dressing Assistance:  Total assistance(dependent)         Cognitive Retraining  Attention to Task: Single task  Maintains Attention For (Time): Greater than 10 minutes  Following Commands: Follows one step commands/directions  Safety/Judgement: Fall prevention; Insight into deficits    Therapeutic Exercises:   Trained need for quad, glute and abdominal isometric exercises throughout the day, eg with each commercial change to maximize conditioning for improved ADL skills,     Pain:  \"its bad when I move; kyphoplasty pending at this time for comp fx    Activity Tolerance:   Fair, Poor, requires frequent rest breaks, and very limited by back pain this session    After treatment patient left in no apparent distress:   Supine in bed, Call bell within reach, Caregiver / family present, and Side rails x 3    COMMUNICATION/COLLABORATION:   The patients plan of care was discussed with: Registered nurse.      Michelle Coombs OTR/L  Time Calculation: 30 mins

## 2020-12-22 NOTE — PROGRESS NOTES
Hospitalist Progress Note    NAME: Manny Quinn   :  1950   MRN:  [id number]     I reviewed pertinent labs and imaging, and discussed /agreed on the plan of care with Dr. Anita Mcfadden / Plan:  Worsening Back Pain with Lower extremity weakness POA    CT spine/lumbar 2020: IMPRESSION: Acute superior endplate fracture of L1 with Route 15% height loss. Multilevel degenerative changes with bilateral neural foraminal narrowing at the L4-5 and L5-S1 levels. Scheduled for Kyphoplasty with IR today  Appreciate Neurosurgery input- not a surgical candidate  Continue tramadol and cyclobenzaprine for pain  Whole body scan done   IMPRESSION  IMPRESSION:   1. Acute L1 compression fracture. 2. No pattern of skeletal metastases. Hypokalemia (Improved)    Monitor    Continue Potassium supplement  Hypertension POA   Hydralazine BID    Metoprolol XL daily    Lisinopril daily    Monitor  Acute/Chronic LLE Cellulitis POA  Chronic Venous Stasis BLE POA  Hx of Chronic Diabetic Left foot ulcer-healed    States she has had chronic LE cellulitiis x 5 years    LLE erythema and venous stasis ulcer    Continue doxycycline x 5 days    Wound culture Heavy pseudomonas and MRSA +   Afebrile at this time  Levaquin 500 mg Q 48 hours 1st dose 10/22/2020  Doxycycline x 10 days completed day 6/10  Acute on Chronic Respiratory Failure with hypoxia related to Acute/Chronic Systolic/diiastolic CHF POA    CXR : Without acute infiltrate or congestion    CTA Chest - \"Small bilateral pleural effusions with overlying atelectasis. No pulmonary embolus or other acute cardiopulmonary process. \"       ECHO 2020 - EF 35-40%. Dilated left ventricle. Mild concentric hypertrophy. Severe systolic function. Left ventricular diastolic function.   [address]  Cardiology input - no plans for any invasive cardiac work up ,not grossly overloaded   Scheduled for BIV-ICD interrogation today    Continue 1 Liter Fluid Restrictiion    Pro BNP elevated on admission 2,724 improved to 1,334 on 12/20/2020    Continue Bumex daily    Strict I&O's    Daily weight  Pulmonary Hypertension POA  Coronary Artery Disease s/p PCI   Ischemic Cardiomyopathy   S/p BiV ICD 2018  Chronic Atrial Fibrillation - V paced currently   Hyperlipidemia    Continue statin therapy    Aspirin  daily    Scheduled for ICD interrogation today    Pradaxa BID on hold pending Kyphoplasty   CKD stage III baseline creatinine 1.5   slight increase in creatinine this morning 1.6 up from 1.5 on 12/21/2020    Avoid Nephrotoxic medications  Type II Diabetes with Diabetic Neuropathy POA    Continue sliding scale    HgB A1C 6.0 on 12/15/2020    Diabetic diet    Monitor  Anemia of Chronic disease POA    HgB stable at 9.9 this am    Monitor  GERD POA    Protonix daily  Anxiety/Depression POA    Continue Venlafaxine-SR,     Continue clonazepam    30.0 - 39.9 Obese / Body mass index is 39.39 kg/m². Code status: Full  Prophylaxis: Pradaxa on hold for kyphoplasty  Recommended Disposition: Home w/Family     Subjective:     Chief Complaint / Reason for Physician Visit  Patient states her back pain has improved. She is scheduled for kyphoplasty and interrogation of her AICD. Lyn Siddiqui Discussed with RN events overnight. Review of Systems:  Symptom Y/N Comments  Symptom Y/N Comments   Fever/Chills n   Chest Pain n    Poor Appetite n   Edema y BLE   Cough n   Abdominal Pain     Sputum n   Joint Pain     SOB/STEEN n   Pruritis/Rash n    Nausea/vomit n   Tolerating PT/OT     Diarrhea n   Tolerating Diet y    Constipation n   Other       Could NOT obtain due to:      Objective:     VITALS:   Last 24hrs VS reviewed since prior progress note.  Most recent are:  Patient Vitals for the past 24 hrs:   Temp Pulse Resp BP SpO2   12/22/20 1530  80 12 (!) 103/53 100 %   12/22/20 1515  79 12 (!) 86/53 100 %   12/22/20 1500  80 11 93/69 94 %   12/22/20 1455  80 12 92/60 93 %   12/22/20 1450  80 12 (!) 94/56 94 % 12/22/20 1445  80 14 122/69 95 %   12/22/20 1440  80 16 131/75 96 %   12/22/20 0725 98.1 °F (36.7 °C) 80 16 119/75 95 %   12/21/20 2322 98.2 °F (36.8 °C) 80 16 138/78 96 %   12/21/20 1954 97.9 °F (36.6 °C) 80 16 116/69 95 %       Intake/Output Summary (Last 24 hours) at 12/22/2020 1546  Last data filed at 12/21/2020 1755  Gross per 24 hour   Intake    Output 500 ml   Net -500 ml        I had a face to face encounter and independently examined this patient on 12/22/2020, as outlined below:  PHYSICAL EXAM:  General:  Alert, cooperative, no acute distress,morbid obesity  EENT:   Anicteric sclerae. normocephalic  Resp:  CTA bilaterally, no wheezing or rales. No accessory muscle use  CV:  Regular  rhythm,  No edema  GI:  Soft, Non distended, Non tender. +Bowel sounds  Neurologic:  Alert and oriented X 3, normal speech,   Psych:   Good insight. Not anxious nor agitated  Skin:  No rashes. No jaundice    Reviewed most current lab test results and cultures  YES  Reviewed most current radiology test results   YES  Review and summation of old records today    NO  Reviewed patient's current orders and MAR    YES  PMH/ reviewed - no change compared to H&P  ________________________________________________________________________  Care Plan discussed with:    Comments   Patient y    Family  y    RN y    Care Manager     Consultant                        Multidiciplinary team rounds were held today with , nursing, pharmacist and clinical coordinator. Patient's plan of care was discussed; medications were reviewed and discharge planning was addressed. ________________________________________________________________________    ________________________________________________________________________  [facility], NP     Procedures: see electronic medical records for all procedures/Xrays and details which were not copied into this note but were reviewed prior to creation of Plan.       LABS:  I reviewed today's most current labs and imaging studies.   Pertinent labs include:  Recent Labs     12/22/20  0424 12/21/20  0612 12/20/20  0456   WBC 5.8 5.1 5.3   HGB 9.9* 9.6* 9.6*   HCT 35.0 37.9 34.0*    169 196     Recent Labs     12/22/20  0730 12/20/20  0456    142   K 4.1 3.6   * 110*   CO2 27 28   * 116*   BUN 31* 26*   CREA 1.66* 1.58*   CA 9.2 9.5       Signed: Iman Bautista NP

## 2020-12-22 NOTE — PROGRESS NOTES
Physical Therapy    Chart reviewed, patient scheduled for kyphoplasty today so will defer and continue to follow.     Sophia Degroot

## 2020-12-23 LAB
ANION GAP SERPL CALC-SCNC: 5 MMOL/L (ref 5–15)
ATRIAL RATE: 82 BPM
BUN SERPL-MCNC: 33 MG/DL (ref 6–20)
BUN/CREAT SERPL: 20 (ref 12–20)
CALCIUM SERPL-MCNC: 9.2 MG/DL (ref 8.5–10.1)
CALCULATED R AXIS, ECG10: -90 DEGREES
CALCULATED T AXIS, ECG11: 75 DEGREES
CHLORIDE SERPL-SCNC: 110 MMOL/L (ref 97–108)
CO2 SERPL-SCNC: 27 MMOL/L (ref 21–32)
CREAT SERPL-MCNC: 1.63 MG/DL (ref 0.55–1.02)
DIAGNOSIS, 93000: NORMAL
ERYTHROCYTE [DISTWIDTH] IN BLOOD BY AUTOMATED COUNT: 19.5 % (ref 11.5–14.5)
GLUCOSE BLD STRIP.AUTO-MCNC: 119 MG/DL (ref 65–100)
GLUCOSE BLD STRIP.AUTO-MCNC: 125 MG/DL (ref 65–100)
GLUCOSE BLD STRIP.AUTO-MCNC: 171 MG/DL (ref 65–100)
GLUCOSE SERPL-MCNC: 100 MG/DL (ref 65–100)
HCT VFR BLD AUTO: 37 % (ref 35–47)
HGB BLD-MCNC: 10.3 G/DL (ref 11.5–16)
MCH RBC QN AUTO: 23.4 PG (ref 26–34)
MCHC RBC AUTO-ENTMCNC: 27.8 G/DL (ref 30–36.5)
MCV RBC AUTO: 84.1 FL (ref 80–99)
NRBC # BLD: 0 K/UL (ref 0–0.01)
NRBC BLD-RTO: 0 PER 100 WBC
PLATELET # BLD AUTO: 205 K/UL (ref 150–400)
PMV BLD AUTO: 11.5 FL (ref 8.9–12.9)
POTASSIUM SERPL-SCNC: 4.2 MMOL/L (ref 3.5–5.1)
Q-T INTERVAL, ECG07: 480 MS
QRS DURATION, ECG06: 158 MS
QTC CALCULATION (BEZET), ECG08: 553 MS
RBC # BLD AUTO: 4.4 M/UL (ref 3.8–5.2)
SERVICE CMNT-IMP: ABNORMAL
SODIUM SERPL-SCNC: 142 MMOL/L (ref 136–145)
VENTRICULAR RATE, ECG03: 80 BPM
WBC # BLD AUTO: 6.7 K/UL (ref 3.6–11)

## 2020-12-23 PROCEDURE — 74011250637 HC RX REV CODE- 250/637: Performed by: STUDENT IN AN ORGANIZED HEALTH CARE EDUCATION/TRAINING PROGRAM

## 2020-12-23 PROCEDURE — 74011636637 HC RX REV CODE- 636/637: Performed by: STUDENT IN AN ORGANIZED HEALTH CARE EDUCATION/TRAINING PROGRAM

## 2020-12-23 PROCEDURE — 97535 SELF CARE MNGMENT TRAINING: CPT

## 2020-12-23 PROCEDURE — 74011250637 HC RX REV CODE- 250/637: Performed by: NURSE PRACTITIONER

## 2020-12-23 PROCEDURE — 77010033678 HC OXYGEN DAILY

## 2020-12-23 PROCEDURE — 94640 AIRWAY INHALATION TREATMENT: CPT

## 2020-12-23 PROCEDURE — 85027 COMPLETE CBC AUTOMATED: CPT

## 2020-12-23 PROCEDURE — 74011250637 HC RX REV CODE- 250/637: Performed by: INTERNAL MEDICINE

## 2020-12-23 PROCEDURE — 36415 COLL VENOUS BLD VENIPUNCTURE: CPT

## 2020-12-23 PROCEDURE — 74011250636 HC RX REV CODE- 250/636: Performed by: NURSE PRACTITIONER

## 2020-12-23 PROCEDURE — 82962 GLUCOSE BLOOD TEST: CPT

## 2020-12-23 PROCEDURE — 94760 N-INVAS EAR/PLS OXIMETRY 1: CPT

## 2020-12-23 PROCEDURE — 65270000029 HC RM PRIVATE

## 2020-12-23 PROCEDURE — 97164 PT RE-EVAL EST PLAN CARE: CPT

## 2020-12-23 PROCEDURE — 97116 GAIT TRAINING THERAPY: CPT

## 2020-12-23 PROCEDURE — 74011000258 HC RX REV CODE- 258: Performed by: NURSE PRACTITIONER

## 2020-12-23 PROCEDURE — 80048 BASIC METABOLIC PNL TOTAL CA: CPT

## 2020-12-23 PROCEDURE — 97530 THERAPEUTIC ACTIVITIES: CPT

## 2020-12-23 RX ORDER — LEVOFLOXACIN 750 MG/1
750 TABLET ORAL
Status: DISCONTINUED | OUTPATIENT
Start: 2020-12-24 | End: 2020-12-24 | Stop reason: HOSPADM

## 2020-12-23 RX ORDER — DOXYCYCLINE HYCLATE 100 MG
100 TABLET ORAL EVERY 12 HOURS
Status: DISCONTINUED | OUTPATIENT
Start: 2020-12-23 | End: 2020-12-24 | Stop reason: HOSPADM

## 2020-12-23 RX ADMIN — Medication 10 ML: at 22:34

## 2020-12-23 RX ADMIN — HYDRALAZINE HYDROCHLORIDE 50 MG: 50 TABLET, FILM COATED ORAL at 17:51

## 2020-12-23 RX ADMIN — HYDROCODONE BITARTRATE AND ACETAMINOPHEN 1 TABLET: 5; 325 TABLET ORAL at 22:33

## 2020-12-23 RX ADMIN — ASPIRIN 81 MG CHEWABLE TABLET 81 MG: 81 TABLET CHEWABLE at 09:09

## 2020-12-23 RX ADMIN — HYDRALAZINE HYDROCHLORIDE 50 MG: 50 TABLET, FILM COATED ORAL at 09:12

## 2020-12-23 RX ADMIN — DOXYCYCLINE 100 MG: 100 INJECTION, POWDER, LYOPHILIZED, FOR SOLUTION INTRAVENOUS at 09:13

## 2020-12-23 RX ADMIN — HYDROCODONE BITARTRATE AND ACETAMINOPHEN 1 TABLET: 5; 325 TABLET ORAL at 09:45

## 2020-12-23 RX ADMIN — FLUTICASONE FUROATE AND VILANTEROL TRIFENATATE 1 PUFF: 200; 25 POWDER RESPIRATORY (INHALATION) at 08:07

## 2020-12-23 RX ADMIN — LISINOPRIL 20 MG: 20 TABLET ORAL at 09:08

## 2020-12-23 RX ADMIN — DABIGATRAN ETEXILATE MESYLATE 150 MG: 150 CAPSULE ORAL at 09:02

## 2020-12-23 RX ADMIN — PREGABALIN 300 MG: 150 CAPSULE ORAL at 09:09

## 2020-12-23 RX ADMIN — DOXYCYCLINE HYCLATE 100 MG: 100 TABLET, COATED ORAL at 22:32

## 2020-12-23 RX ADMIN — Medication 10 ML: at 06:00

## 2020-12-23 RX ADMIN — CLONAZEPAM 0.5 MG: 0.5 TABLET ORAL at 22:32

## 2020-12-23 RX ADMIN — VENLAFAXINE HYDROCHLORIDE 150 MG: 150 CAPSULE, EXTENDED RELEASE ORAL at 09:10

## 2020-12-23 RX ADMIN — PANTOPRAZOLE SODIUM 40 MG: 40 TABLET, DELAYED RELEASE ORAL at 09:10

## 2020-12-23 RX ADMIN — ATORVASTATIN CALCIUM 40 MG: 40 TABLET, FILM COATED ORAL at 22:32

## 2020-12-23 RX ADMIN — DABIGATRAN ETEXILATE MESYLATE 150 MG: 150 CAPSULE ORAL at 17:57

## 2020-12-23 RX ADMIN — METOPROLOL SUCCINATE 100 MG: 50 TABLET, EXTENDED RELEASE ORAL at 09:12

## 2020-12-23 RX ADMIN — POTASSIUM CHLORIDE 20 MEQ: 20 TABLET, EXTENDED RELEASE ORAL at 22:32

## 2020-12-23 RX ADMIN — CALCIUM 500 MG: 500 TABLET ORAL at 09:10

## 2020-12-23 RX ADMIN — Medication 1 TABLET: at 09:10

## 2020-12-23 RX ADMIN — BUMETANIDE 1 MG: 1 TABLET ORAL at 09:11

## 2020-12-23 RX ADMIN — PREGABALIN 300 MG: 150 CAPSULE ORAL at 17:52

## 2020-12-23 RX ADMIN — POTASSIUM CHLORIDE 20 MEQ: 20 TABLET, EXTENDED RELEASE ORAL at 09:11

## 2020-12-23 RX ADMIN — POTASSIUM CHLORIDE 20 MEQ: 20 TABLET, EXTENDED RELEASE ORAL at 17:51

## 2020-12-23 RX ADMIN — INSULIN LISPRO 2 UNITS: 100 INJECTION, SOLUTION INTRAVENOUS; SUBCUTANEOUS at 12:19

## 2020-12-23 NOTE — PROGRESS NOTES
Problem: Mobility Impaired (Adult and Pediatric)  Goal: *Acute Goals and Plan of Care (Insert Text)  Description: FUNCTIONAL STATUS PRIOR TO ADMISSION: Patient was modified independent using a rollator for functional mobility. She has been declining recently, however    [address]: The patient lived with her  who assists her as needed. Physical Therapy Goals  Initiated 12/19/2020 - remain appropriate 12/23/2020 following kyphoplasty   1. Patient will move from supine to sit and sit to supine  in bed with independence within 7 day(s). 2.  Patient will transfer from bed to chair and chair to bed with modified independence using the least restrictive device within 7 day(s). 3.  Patient will perform sit to stand with modified independence within 7 day(s). 4.  Patient will ambulate with modified independence for 50 feet with the least restrictive device within 7 day(s). 5.  Patient will ascend/descend 2 stairs with 1-2 handrail(s) with minimal assistance/contact guard assist within 7 day(s). Outcome: Progressing Towards Goal   PHYSICAL THERAPY REEVALUATION  Patient: Anna Oconnell (53 y.o. female)  Date: 12/23/2020  Primary Diagnosis: Acute respiratory failure with hypoxemia ([facility]) [J96.01]        Precautions:   Fall, Contact, Spinal(kyphoplasty 12-22)      ASSESSMENT  Based on the objective data described below, the patient presents with significant improvement in pain levels however generalized weakness, decreased endurance/activity tolerance, mild deficits in higher level balance, and overall impaired functional mobility. Pt performed log roll w/ minAx2 in order to assume seated position EOB. Remaining functional mobility occurred with CG/minAx1 including sit<>stand transfer and ambulation trial of 25ft w/ RW support. Gait slow and shuffled however steady overall w/ RW support.  Pt experienced drop in BP upon assuming standing position however denied complaints of dizziness and BP stabilized post activity. Anticipate pt will continue to progress well with therapy and be appropriate to return home w/ HHPT and assist of family. Current Level of Function Impacting Discharge (mobility/balance): minAx2 for bed mobility, minAx1 for sit<>stand transfer, CGAx1 with use of RW    Functional Outcome Measure: The patient scored 50/100 on the Barthel Index outcome measure which is indicative of moderate impairment in functional mobility and ADLs. Other factors to consider for discharge: caregiver burden, falls risk     Patient will benefit from skilled therapy intervention to address the above noted impairments. PLAN :  Recommendations and Planned Interventions: bed mobility training, transfer training, gait training, therapeutic exercises, patient and family training/education, and therapeutic activities      Frequency/Duration: Patient will be followed by physical therapy:  4 times a week to address goals. Recommendation for discharge: (in order for the patient to meet his/her long term goals)  Physical therapy at least 2 days/week in the home AND ensure assist and/or supervision for safety with functional mobility and ADLs    This discharge recommendation:  Has been made in collaboration with the attending provider and/or case management    Equipment recommendations for successful discharge (if) home: patient owns DME required for discharge         SUBJECTIVE:   Patient stated I haven't been out of bed in days.     OBJECTIVE DATA SUMMARY:   HISTORY:    Past Medical History:   Diagnosis Date    Anxiety 1/22/2018    Arthritis     OA    Asthma     Diabetes ([facility] [address]     Essential hypertension     GERD (gastroesophageal reflux disease)     Hypercholesterolemia 1/22/2018    Hypertension     Ill-defined condition     diverticulitis    Long-term use of high-risk medication 1/22/2018    Neuropathy     Obesity (BMI 30-39.9) 4/30/2019    Other ill-defined conditions(799.89)     IBS, spinal stenosis    Plantar fasciitis     Psychiatric disorder     depression, anxiety    Sleep apnea     uses CPAP    Type 2 diabetes mellitus with diabetic neuropathy, without long-term current use of insulin ([facility] [address] 6/5/2016     Past Surgical History:   Procedure Laterality Date    CARDIAC SURG PROCEDURE UNLIST      stent    COLONOSCOPY N/A 3/14/2018    COLONOSCOPY performed by Angel Jiménez MD at [facility] ENDOSCOPY    HX APPENDECTOMY      HX CHOLECYSTECTOMY  11/15/2018    HX HYSTERECTOMY      HX PACEMAKER      IR KYPHOPLASTY LUMBAR  12/22/2020     Hospital course since last seen and reason for reevaluation: pt s/p kyphoplasty 12/22/2020    Personal factors and/or comorbidities impacting plan of care:     Home Situation  Home Environment: Private residence  # Steps to Enter: 2  Rails to Enter: Yes  One/Two Story Residence: One story  Living Alone: No  Support Systems: Family member(s)  Patient Expects to be Discharged to[de-identified] Private residence  Current DME Used/Available at Home: CPAP  Tub or Shower Type: Tub/Shower combination    EXAMINATION/PRESENTATION/DECISION MAKING:   Critical Behavior:  Neurologic State: Alert  Orientation Level: Oriented X4  Cognition: Appropriate decision making, Appropriate for age attention/concentration, Appropriate safety awareness, Follows commands  Safety/Judgement: Fall prevention, Insight into deficits  Hearing:   Auditory  Auditory Impairment: Hard of hearing, bilateral  Skin:  intact  Edema: none noted  Range Of Motion:  AROM: Generally decreased, functional                       Strength:    Strength: Generally decreased, functional                    Tone & Sensation:   Tone: Normal              Sensation: Impaired(diabetic neuropathy )               Coordination:  Coordination: Within functional limits  Vision:      Functional Mobility:  Bed Mobility:  Rolling: Minimum assistance;Assist x2  Supine to Sit: Minimum assistance;Assist x2        Transfers:  Sit to Stand: Minimum assistance;Assist x1           Bed to Chair: Contact guard assistance;Assist x1              Balance:   Sitting: Intact  Standing: Impaired; With support(RW)  Standing - Static: Good;Constant support  Standing - Dynamic : Fair;Constant support  Ambulation/Gait Training:  Distance (ft): 25 Feet (ft)  Assistive Device: Walker, rolling;Gait belt  Ambulation - Level of Assistance: Contact guard assistance;Assist x1        Gait Abnormalities: Decreased step clearance;Trunk sway increased        Base of Support: Widened     Speed/Brielle: Slow  Step Length: Right shortened;Left shortened                     Functional Measure:  Barthel Index:    Bathin  Bladder: 5  Bowels: 10  Groomin  Dressin  Feeding: 10  Mobility: 0  Stairs: 0  Toilet Use: 5  Transfer (Bed to Chair and Back): 10  Total: 50/100       The Barthel ADL Index: Guidelines  1. The index should be used as a record of what a patient does, not as a record of what a patient could do. 2. The main aim is to establish degree of independence from any help, physical or verbal, however minor and for whatever reason. 3. The need for supervision renders the patient not independent. 4. A patient's performance should be established using the best available evidence. Asking the patient, friends/relatives and nurses are the usual sources, but direct observation and common sense are also important. However direct testing is not needed. 5. Usually the patient's performance over the preceding 24-48 hours is important, but occasionally longer periods will be relevant. 6. Middle categories imply that the patient supplies over 50 per cent of the effort. 7. Use of aids to be independent is allowed. Urmila Massey., Barthel, D.W. (2535). Functional evaluation: the Barthel Index. [facility] (14)2. ENRIQUE Lundberg, Roselia Rodriguez., Boubacar Appiah., Jennifer, [address] [place], [place] [address] ().  Measuring the change indisability after inpatient rehabilitation; comparison of the responsiveness of the Barthel Index and Functional [place] Measure. Journal of Neurology, Neurosurgery, and Psychiatry, 66(4), 403-157. AKASH Bales.LEANDRO, PAWAN Izaguirre, & Lisandro West M.A. (2004.) Assessment of post-stroke quality of life in cost-effectiveness studies: The usefulness of the Barthel Index and the EuroQoL-5D. Quality of Life Research, 13, 427-43             Pain Ratin/10 pain in low back    Activity Tolerance:   Good    After treatment patient left in no apparent distress:   Sitting in chair and Call bell within reach    COMMUNICATION/EDUCATION:   The patients plan of care was discussed with: Occupational therapist and Registered nurse. Fall prevention education was provided and the patient/caregiver indicated understanding., Patient/family have participated as able in goal setting and plan of care. , and Patient/family agree to work toward stated goals and plan of care.     Thank you for this referral.  Chad Argueta, PT, DPT   Time Calculation: 21 mins

## 2020-12-23 NOTE — PROGRESS NOTES
Hospitalist Progress Note    NAME: Apurva Montelongo   :  1950   MRN:  [id number]     I reviewed pertinent labs and imaging, and discussed /agreed on the plan of care with Dr. Camila Zarate. Assessment / Plan:  Worsening Back Pain with Lower extremity weakness POA    CT spine/lumbar 2020: IMPRESSION: Acute superior endplate fracture of L1 with Route 15% height loss. Multilevel degenerative changes with bilateral neural foraminal narrowing at the L4-5 and L5-S1 levels. S/p kyphoplasty on 2020  Appreciate Neurosurgery input- not a surgical candidate  Continue tramadol and cyclobenzaprine for pain  Whole body scan done   IMPRESSION  IMPRESSION:   1. Acute L1 compression fracture. 2. No pattern of skeletal metastases. PT/OT  Hypokalemia (Improved)    Monitor    Continue Potassium supplement  Hypertension POA   Hydralazine BID    Metoprolol XL daily    Lisinopril daily    Monitor  Acute/Chronic LLE Cellulitis POA  Chronic Venous Stasis BLE POA  Hx of Chronic Diabetic Left foot ulcer-healed    States she has had chronic LE cellulitiis x 5 years    LLE erythema and venous stasis ulcer    Wound culture Heavy pseudomonas and MRSA +   Afebrile at this time  Levaquin 750 mg Q 48 hours 1st dose 10/22/2020 2/10 doses  Doxycycline x 10 days completed day 7/10  Acute on Chronic Respiratory Failure with hypoxia related to Acute/Chronic Systolic/diiastolic CHF POA    CXR : Without acute infiltrate or congestion    CTA Chest - \"Small bilateral pleural effusions with overlying atelectasis. No pulmonary embolus or other acute cardiopulmonary process. \"       ECHO 2020 - EF 35-40%. Dilated left ventricle. Mild concentric hypertrophy. Severe systolic function. Left ventricular diastolic function.   Appreciate  Cardiology input - no plans for any invasive cardiac work up ,not grossly overloaded   S/P for BIV-ICD interrogation   adjustments made  by Dr Laura Rogers (Multipoint pacing (MPP) algorithm enabled RV>LV1>LV2 with LV1 vector (M3-P4) and LV2 vector (D1-M2).  Capture thresholds were good, no phrenic nerve stimulation.  Changed mode from DDDR to VVIR due to permanent atrial fibrillation) Per Cardiology note. Continue 1 Liter Fluid Restrictiion    Pro BNP elevated on admission 2,724 improved to 1,334 on 12/20/2020    Continue Bumex daily    Strict I&O's    Daily weight  Pulmonary Hypertension POA  Coronary Artery Disease s/p PCI   Ischemic Cardiomyopathy   S/p BiV ICD 2018  Chronic Atrial Fibrillation - V paced currently   Hyperlipidemia    Continue statin therapy    Aspirin  daily    S/P ICD interrogation     Pradaxa BID restarted   CKD stage III baseline creatinine 1.5   slight increase in creatinine this morning 1.6 up from 1.5 on 12/21/2020    Avoid Nephrotoxic medications  Type II Diabetes with Diabetic Neuropathy POA    Continue sliding scale    HgB A1C 6.0 on 12/15/2020    Diabetic diet    Monitor  Anemia of Chronic disease POA    HgB stable at 10.3 this am    Monitor  GERD POA    Protonix daily  Anxiety/Depression POA    Continue Venlafaxine-SR,     Continue clonazepam      30.0 - 39.9 Obese / Body mass index is 39.39 kg/m². Code status: Full  Prophylaxis: Pradaxa  Recommended Disposition:  PT, OT, RN     Subjective:     Chief Complaint / Reason for Physician Visit  Patient resting comfortably. Dressing changed on left lower leg, small ulcer noted, erythema improving as well as edema. Dressing applied. Discussed with RN events overnight. Review of Systems:  Symptom Y/N Comments  Symptom Y/N Comments   Fever/Chills n   Chest Pain n    Poor Appetite n   Edema y Left lower Extremity   Cough n   Abdominal Pain     Sputum n   Joint Pain     SOB/STEEN n   Pruritis/Rash     Nausea/vomit n   Tolerating PT/OT y    Diarrhea n   Tolerating Diet y    Constipation n   Other       Could NOT obtain due to:      Objective:     VITALS:   Last 24hrs VS reviewed since prior progress note.  Most recent are:  Patient Vitals for the past 24 hrs:   Temp Pulse Resp BP SpO2   12/23/20 1520 98 °F (36.7 °C) 82 18 115/76 96 %   12/23/20 1438  80  127/78    12/23/20 1433  79  107/75    12/23/20 1431  85  126/62    12/23/20 1427  79  123/72    12/23/20 0810 98 °F (36.7 °C) 79 16 (!) 150/79 96 %   12/23/20 0807     96 %   12/22/20 2030 98 °F (36.7 °C) 83 16 (!) 97/59 95 %   12/22/20 1819 98 °F (36.7 °C) 85 14 126/74 95 %       Intake/Output Summary (Last 24 hours) at 12/23/2020 1711  Last data filed at 12/23/2020 0430  Gross per 24 hour   Intake 100 ml   Output 750 ml   Net -650 ml        I had a face to face encounter and independently examined this patient on 12/23/2020, as outlined below:  PHYSICAL EXAM:  General:            Alert, cooperative, no acute distress, obese   EENT:   Anicteric sclerae. normocephalic  Resp:  CTA bilaterally, no wheezing or rales. No accessory muscle use  CV:  Regular  rhythm,  No edema  GI:  Soft, Non distended, Non tender. +Bowel sounds  Neurologic:  Alert and oriented X 3, normal speech,   Psych:   Good insight. Not anxious nor agitated  Skin:  No rashes. No jaundice    Reviewed most current lab test results and cultures  YES  Reviewed most current radiology test results   YES  Review and summation of old records today    NO  Reviewed patient's current orders and MAR    YES  PMH/SH reviewed - no change compared to H&P  ________________________________________________________________________  Care Plan discussed with:    Comments   Patient y    Family  y spouse   RN y    Care Manager y    Consultant                        Multidiciplinary team rounds were held today with , nursing, pharmacist and clinical coordinator. Patient's plan of care was discussed; medications were reviewed and discharge planning was addressed. ________________________________________________________________________    ________________________________________________________________________  [place] NP     Procedures: see electronic medical records for all procedures/Xrays and details which were not copied into this note but were reviewed prior to creation of Plan. LABS:  I reviewed today's most current labs and imaging studies.   Pertinent labs include:  Recent Labs     12/23/20  0251 12/22/20  0424 12/21/20  0612   WBC 6.7 5.8 5.1   HGB 10.3* 9.9* 9.6*   HCT 37.0 35.0 37.9    184 169     Recent Labs     12/23/20  0251 12/22/20  0730    142   K 4.2 4.1   * 111*   CO2 27 27    144*   BUN 33* 31*   CREA 1.63* 1.66*   CA 9.2 9.2       Signed: [place] NP

## 2020-12-23 NOTE — PROGRESS NOTES
Cardiology Progress Note  12/23/2020     Admit Date: 12/13/2020  Admit Diagnosis: Acute respiratory failure with hypoxemia (HCC) [J96.01]  CC: none currently  Cardiologist:  Dr Francisco Alvares. Cardiac Assessment/Plan:    1) CAD (Prox LAD ISELA 4/2014 for NQWMI), Neg PET for ischemia 9/2018. Min CAD 2/2019.  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 Gretchen Due was too expensive. *EF 15-20% 9/2018. EF 30% w/ minimal CAD at cath 2/2019. EF 20-25% (m-mod MR; mod TR) 8/2019. *EF 35-40% 8/2020 (admit for CHF: too much salt). 3) HTN,   4) AFIB: PAfib (RFA 4/2016 and 1/2017), Recurrent/persistent afib 2017/2018: AV node ablation 10/2018. *Chronic AC w/ pradaxa. 5) DM,   6) Dyslipidemia   7) CKD III: Aldactone stopped in 2014. Cr 2 11/2018 (after becky): Cr 1.5 12/2018 & 8/2019; Cr 1.4/gfr 39 12/2020 (Dr. Melissa Holder). 8) St Don PPM 7/2014 for bradycardia while on therapy for AFib: changed to BiV ICD 10/2018. *adjustments made 12/22 by Dr Chloe Villasenor (Multipoint pacing (MPP) algorithm enabled RV>LV1>LV2 with LV1 vector (M3-P4) and LV2 vector (D1-M2). Capture thresholds were good, no phrenic nerve stimulation. Changed mode from DDDR to VVIR due to permanent atrial fibrillation)  9) nephrolithiasis  10) SHAYLA (on CPAP),   11) cholecystectomy 11/2018.  12) LE venous insufficiency:  B SFV ablation 3/2020. Continues to use compression stockings/wraps. Obesity: 240# 2014; 262# 9/2019. 270 to 281# 2020. 279# 8/2020; 281# 12/2020.     Imp 12/14: stable cardiac status; not grossly overloaded;   Lymphedema compression pumps are pending as noted below. No plan for invasive cardiac testing; Dispo per primary team.      12/18: BPs 120-140s yesterday; HR 70-80s; 92% RA. No wt nor I/Os. Hg 9.4; Cr 1.37  Cardiac meds; asa 81; lipitor 40; bumex 2 PO qday; hydralazine 50 bid; Imdur 60; lisinopril 20; toprol ; (to stay off norvasc in case contributing to LE edema).  Lovneox 120 bid.    No new cardiac recs; stable cardiac status. Restart pradaxa when appropriate. Dispo/ortho per primary team.  No active cardiac issues; will sign-off; Please call if questions/issues. Routine f/u with me after d/c. Thanks. 12/23: BPs 90-130s; HR 80s; 95% CPAP (sleeping). Hg 10.3; Cr 1.63 (no change). Cardiac meds: asa 81; lipitor 40; bumex  1 PO qam; hydralazine 50 bid; imdur 60; lisinopril 20; toprol  qday; kcl 20 tid. Restart pradaxa per primary team.  Richard Gilford for now; BPs trending down; PRN NTG paste; As she starts to mobilize, may be orthostatic after prolonged bed rest here. Dispo per primary team.  Cardiac status remains stable.  ____________________________________________________________________  Karen De Leon is a 78 y.o. female presents with Acute respiratory failure with hypoxemia ([facility] [address] [J96.01].      As noted in H&P: \"PRESENT ILLNESS:     Nitza Luis is a 78 y.o.   female with past medical history significant for CHF, SHAYLA on CPAP, and spinal stenosis who presents with worsening of lower back pain over the last 3 weeks.  Patient has chronic lower back pain that is on and off for years.  She follows up with an orthopedic doctor and receives steroid injections almost every 2 months. Shell Thomas last dose was in October.  Patient gets some relief with the steroid injection but the pain often recurs.  Over the last 3 weeks, her lower back pain has gotten worse to the extent she was unable to walk or get out of bed.  The pain has been persistent, 10/10, and occasionally radiates to the right leg.  Worse with any kind of movement, no relieving factor.  She has tried different analgesics and muscle relaxants with no significant improvement.  She denies subjective fever, bowel/bladder incontinence, or lower extremity weakness.  Patient's  called EMS for worsening back pain.  Per EMS patient was febrile with a temp of 100.7.  No record of fever in the ED.  In the ED patient was noted to have a sat of 82% on room air.  Patient states she has chronic shortness of breath and gets winded even with walking short distance.  She follows up with her pulmonologist and she was recently prescribed a pulse oximeter to monitor her oxygen but she has not gotten it yet. Ashley Metzger denies recent worsening of shortness of breath, cough, chest pain, myalgia, or fatigue.  She does have contact history with her son-in-law who has COVID infection.     Of note, patient also has left lower leg erythema and ulcer which started as a blister about a week ago. She was prescribed topical ointment/?antibiotics but hasn't seen any improvement. \"     ______________________________________________________________________     The patient reports no chest pain/pressure; no change in chronic dyspnea; no change in LE edema: plan for compression Rx as noted below.     No PND, orthopnea, palpitations, pre-syncope, syncope, peripheral edema, or decrease in exercise tolerance. No current complaints.      ECG: Afib; Vpac int. Tele: none  CXR: \"No acute findings. \" NO CHF. Notable labs: Neg COVID; Hg 9.1; Cr 1.38 from 1.57 from 1.4-1.5 range. Nl troponin x1; proBNP 1.8k  Nl TSH & LDL 53 8/2020.   _______________________________________________________________  Notable prior cardiac history:  @ OV 10/10/20:  Ms Janell Donohue has a h/o:  1) CAD (Prox LAD ISELA 4/2014 for NQWMI), Neg PET for ischemia 9/2018. Min CAD 2/2019.  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 Marceil Jester was too expensive. *EF 15-20% 9/2018. EF 30% w/ minimal CAD at cath 2/2019. EF 20-25% (m-mod MR; mod TR) 8/2019. *EF 35-40% 8/2020 (admit for CHF: too much salt). 3) HTN,   4) AFIB: PAfib (RFA 4/2016 and 1/2017), Recurrent/persistent afib 2017/2018: AV node ablation 10/2018. *Chronic AC w/ pradaxa. 5) DM,   6) Dyslipidemia   7) CKD III: Aldactone stopped in 2014.  Cr 2 11/2018 (after becky): Cr 1.5 12/2018 & 8/2019; Cr 1.4/gfr 39 12/2020 (Dr. Myrna Mota). 8) St Don PPM 7/2014 for bradycardia while on therapy for AFib: changed to BiV ICD 10/2018.  9) nephrolithiasis  10) SHAYLA (on CPAP),   11) cholecystectomy 11/2018.  12) LE venous insufficiency:  B SFV ablation 3/2020. Continues to use compression stockings/wraps. Obesity: 240# 2014; 262# 9/2019. 270 to 281# 2020. 279# 8/2020; 281# 12/2020.     7/2020:  No chest pain nor change in dyspnea. Using CPAP. Had palpitations x1 since last visit. No falling or bleeding.     8/2020:  [employer] last week for CHF; too much sodium. Since discharge, no chest pain nor palpitations. No change in dyspnea; less LE edema overall, (never fully resolves). Occasional lightheaded if she gets up too quickly. Complains of continued fatigue but does not wish home PT.     12/2020:  No chest pain; decreasing dyspnea. More prominent LE edema/erythema; sees Dr. Kenyon Santoyo later this week.     Patient presents today for 6mo follow up. Continues to have swelling and redness. Reported that she has been doing leg exercises. Patient could not but the compression stockings due to constant fluid leak from left lower extremity. Patient has tried conservative management with compression stocking, exercises and leg elevation for last 6 months. Subsequently patient underwent bilateral GSV ablation. She did not find much improvement in her symptoms. Now patient is having hyperkeratosis of the skin on anterior aspect of the leg, oozing of fluid, she has stage III leg edema     IMPRESSION AND PLAN  01. Lymphedema, not elsewhere classified (I89.0):  Patient has been having chronic edema bilateral lower extremity. She has been following the conservative management with compression stocking, exercises and leg elevation for last 6 months. Subsequently she underwent bilateral GSV ablation. But did not help much with her swelling.   Now she is having hyperkeratosis of the skin in the anterior aspect of the legs, chronic wounds, constant oozing of fluid. Part of her leg edema is because of lymphedema. At this point I think lymphedema compression pumps will help with her swelling. 02. Chronic venous insufficiency (I87.2): She has bilateral lower extremity edema also has signs of chronic venous stasis with discoloration of anterior aspect of legs. She also has congestive heart failure and chronic kidney disease. Her etiology for leg swelling is multifactorial.  She has undergone bilateral GSV RF ablation. She has not seen much improvement yet. I could part of it is because of her lymphedema. Will prescribe her lymphedema compression pumps. 03. Atherosclerotic heart disease of native coronary artery without angina pectoris (I25.10): Minimal CAD @ cath 2/2019.     We discussed the signs and symptoms of unstable angina, myocardial infarction and malignant arrhythmia. The patient knows to seek immediate medical attention should they occur. 04. Dilated cardiomyopathy (I42.0):  Mixed ischemic/non-ischemic (tachycardia mediated) CM previously. Her EF improved but then has worsened again as noted above. EF 30% 2/2019. The patient has an ICD in place. 05. Chronic combined systolic (congestive) and diastolic (congestive) heart failure (I50.42): The patient is compliant with their CHF regimen. is tolerating the current beta-blocker dose. 06. Longstanding persistent atrial fibrillation (I48.11): As per Dr. Kaylee Nunn, off amiodarone & now s/p AV node ablation. She remains on anticoagulation. 07. Hyp hrt & chr kdny dis w hrt fail and stg 1-4/unsp chr kdny (I13.0):  Finally controlled with addition of Imdur. 08. Mixed hyperlipidemia (E78.2):  Lipid labs drawn by PCP. The patient is tolerating lipid lowering therapy well. 09. Chronic kidney disease, stage 3 (moderate) (N18.3):  Cr 1.24/GFR46 11/2015. Cr 1.32/GFR 43 1/2017. Cr 2 11/2018 after becky. Creatinine 1.5 12/2018. 10. Localized edema (R60.0):  Chronic.  She avoids salt. Will evaluate for venous insufficiency. 11. Presence of automatic (implantable) cardiac defibrillator (Z95.810): This is a biventricular device. will continue to be followed in device clinic. 12. Type 2 diabetes mellitus with other specified complication (M99.71):  Lipids & HTN as noted above; DM managed by other MD.   13. Long term (current) use of aspirin (Z79.82): This condition is stable. 15. Long term (current) use of anticoagulants (Z79.01): This condition is stable. Indicated for atrial fibrillation. No bleeding. If she has recurrent falls, she knows to call for re-evaluation of anticoagulation.    15. Body mass index [BMI]40.0-44.9, adult (Z68.41)      ORDERS:  1 Dietary management education, guidance, and counseling     2 Return office visit with Trina Maria MD in 6 Months.           12/10/10 MEDICATION LIST  Medication Sig Desc   acetaminophen 500 mg capsule take 2 capsule by oral route  every 6 hours as needed   albuterol sulfate HFA 90 mcg/actuation aerosol inhaler inhale 2 puff by inhalation route  every 4 - 6 hours as needed   amlodipine 10 mg tablet take 1 tablet by oral route  every day   aspirin 81 mg tablet,delayed release take 1 tablet by oral route  every day   ATORVASTATIN CALCIUM 40 MG Tablet TAKE 1 TABLET EVERY EVENING   Ayr Saline 0.65 % nasal spray aerosol     bumetanide 2 mg tablet take 1 tablet by oral route  every day   Claritin 10 mg tablet take 1 tablet by oral route  every day   clonazepam 0.5 mg tablet take 1 tablet by oral route  every day   clotrimazole-betamethasone 1 %-0.05 % topical cream apply by topical route 2 times every day for 2 weeks to the affected and surrounding areas of skin in the morning and evening   cyclobenzaprine 10 mg tablet take 1 tablet by oral route 2 times every day   glimepiride 2 mg tablet take 1 tablet by oral route  every day   hydralazine 25 mg tablet take 1 tablet by oral route 2 times every day with food   isosorbide mononitrate ER 30 mg tablet,extended release 24 hr take 1 tablet by oral route  twice a day   lidocaine 4 % topical cream     lisinopril 20 mg tablet take 1 tablet by oral route  every day   Lyrica 200 mg capsule take 1 capsule by oral route 2 times every day   magnesium 400 mg (as magnesium oxide) tablet take 1 tablet by oral route  every day   metoprolol succinate  mg tablet,extended release 24 hr TAKE 1 TABLET EVERY DAY   nystatin 100,000 unit/gram topical cream apply by topical route 2 times every day to the affected area(s) as needed   omeprazole 40 mg capsule,delayed release take 1 capsule by oral route  every day before a meal   potassium chloride ER 10 mEq capsule,extended release take 1 Capsule by oral route 3 times daily   Pradaxa 150 mg capsule take 1 capsule by oral route 2 times every day   Premarin 0.625 mg/gram vaginal cream insert 0.5 applicatorful by vaginal route  every day cyclically, 3 weeks on and 1 week off   Symbicort 160 mcg-4.5 mcg/actuation HFA aerosol inhaler inhale 2 puff by inhalation route 2 times every day in the morning and evening   Ultram 50 mg tablet take 1 tablet by oral route  every 6 hours as needed   venlafaxine  mg capsule,extended release 24 hr take 1 capsule by oral route 2 times every day   Vitamin D3 1,000 unit capsule take 1 daily         CARDIAC HISTORY  CAD:  1 NSTEMI [95% Proximal LAD, Resolute (ISELA) to the Proximal LAD.] - 4/8/2014      CHF/CM:  1 Mixed ischemic/tachycardic CM. [Nl TSH.] - 4/2014   2 Ischemic & tachycardic Cardiomyopathy [Echo (EF 0.45) - 06/17/2014, (prev EF 20-30%)] - 6/2014   3 Progressive Cardiomyopathy [Cardiac PET (EF .16) - 09/24/2018, No ischemia noted. EF improved but not normalized; no CAD cath 2/2019.] - 9/2018      ARRHYTHMIA:  1 A Fib [[facility], Plan: amio started; Eliquis with Effient (change eliquis to asa if NSR after 30 days). ] - 4/8/2014   2 PAF - 8/2010   3 A Fib, recurrent; and 6/2014.  [Had marked sinus bradycardia while on bblocker/dig.] - 5/2014   4 Sinus Bradycardia. - 6/2/2014   5 PPM (Devices (Dual Chamber PPM (Obdulia Azul)) - 7/17/2014) - 7/17/2014   6 PAF [CVR; Eliquis restarted (plavix stopped). ] - 9/2015   7 A Fib [RFA] - 4/2016   8 A Fib [RFA] - 1/2017   9 Chronic A Fib [PPM to BiV device, then AV node ablation. Amiodarone stopped. ] - 10/2018      RISK FACTORS:  1 Diabetes [Diagnosed in 2014]   2 Hypertension   3 Dyslipidemia   4 Tobacco Use: No/never         CARDIOVASCULAR PROCEDURES  Procedure Date Results   Cardiac PET 09/24/2018 EF 0.16 (16%), physiologic apical thinning with no ischemia   Cath 02/22/2019 Minimal CAD   Cath 04/08/2014 95% Proximal LAD, Resolute (ISELA) to the Proximal LAD. DCCV 04/08/2014 Initial Rhythm A Fib, Final Rhythm Sinus   Devices 02/08/2019 Bi-Ventricular ICD (St. Don), 100% Afib. BIV pacing >99% (prior AV node ablation)   Devices 07/17/2014 Dual Chamber PPM (St. [id number])   Echo 08/11/2020 LVE; EF 35-40%; normal RV.  minimal AS (mean 9). PAp 43; at [facility]. Echo 08/17/2019 EF 20-25%; mild-to-moderate MR. Moderate TR. Low normal RV function. At [facility]. Echo 02/06/2019 EF 0.30 (30%), Mild MR, Mild TR, Mild LVH, Aortic Sclerosis, LA Diam 3.9 cm, Est RVSP 40 mmHg, global hypokinesis, worse in the inferior wall & apex. Normal RV. Echo 10/08/2018 EF 0.15 (15%), Aortic Sclerosis, Mild MR, Est RVSP 23 mmHg, EF 15-20%; normal RV. At [facility]. Echo 11/28/2017 EF 0.45 (45%), Mild Global Hypo, Mild TR, Mild LVH, Mild MR, Est RVSP 41 mmHg, Normal RV. (probable trileaflet AoV). Echo 10/30/2015 EF 0.45 (45%), Mild LVH, LA Diam 4.1 cm, Est RVSP 35 mmHg, Normal RV. ?bicuspid AoV; (Prob trileaflet on previous ANGELITO). Echo 06/17/2014 EF 0.45 (45%), EF range 45%-50%, Mild LV dilatation. Mild LV systolic dysfunction. ? Bicuspid AoV but prob trileafet on previous ANGELITO. Echo 04/03/2014 EF 0.20 (20%), global HK with lateral sparing; no valve disease.    EKG 12/07/2020 Atrial Fib, Paced Rhythm   Holter 06/09/2014 No Malignant Arrhythmias, Atrial Fib, No correlation with symptoms, (, [address]   Holter 04/30/2014 No Malignant Arrhythmias, Atrial Fib, No correlation with symptoms, \"light or heavy chest\" = afib in 60s. HR  (ave 63). ASx pauses (upto 2.8) while asleep. RFA 01/19/2017 Indication A Fib, Final Rhythm Sinus   RFA   Indication A Fib   Sleep Study   OSAS: Moderate   ANGELITO 04/08/2014 EF 0.35 (35%), No BRAYDON Clot, prob trileaflet AoV. Venous Duplex 04/16/2020 Left:  1. Post venous procedure duplex exam shows no evidence of thrombus in the saphenofemoral junction. 2. Left mid calf positive  5mm in diameter and 2.8s in reflux.     Right:  Right distal calf positive  4.1mm in diameter and .53s in reflux. Venous Duplex 03/27/2020 Post venous ablation duplex exam shows no evidence of heat induced thrombus in the right common femoral vein. Venous Duplex 02/19/2020 Exam was conducted with patient in a high reverse-trendelenburg position. The (right/left/bilateral) great saphenous vein is incompetent (above/below the knee) (throughout the lower extremity) with retrograde flow >0.5seconds.        For other plans, see orders.   Hospital problem list     Active Hospital Problems    Diagnosis Date Noted    Obesity (BMI 30-39.9) 04/30/2019     Priority: 1 - One    Ischemic cardiomyopathy 11/13/2018     Priority: 1 - One    Stage 3 chronic kidney disease 11/13/2018     Priority: 1 - One    Hypercholesterolemia 01/22/2018     Priority: 1 - One    Lower back pain 12/13/2020    Hypoxia 92/37/6316    Systolic CHF, chronic ([address] 06/05/2016    Chronic atrial fibrillation ([address] 06/05/2016    Type 2 diabetes mellitus with diabetic neuropathy, without long-term current use of insulin ([address] 64/51/5104    Acute systolic heart failure ([address] 04/04/2014        Subjective: Esther Whitfield reports       Chest pain X none  consistent with:  Non-cardiac CP         Atypical CP     None now  On going  Anginal CP     Dyspnea X none  at rest  with exertion         improved  unchanged  worse              PND X none  overnight       Orthopnea X none  improved  unchanged  worse   Presyncope X none  improved  unchanged  worse     Ambulated in hallway without symptoms   Yes   Ambulated in room without symptoms  Yes   Objective:     Physical Exam:  Overall VSSAF;    Visit Vitals  BP (!) 97/59 (BP 1 Location: Right arm, BP Patient Position: At rest;Supine)   Pulse 83   Temp 98 °F (36.7 °C)   Resp 16   Ht 5' 9\" (1.753 m)   Wt 121 kg (266 lb 12.1 oz)   SpO2 95%   Breastfeeding No   BMI 39.39 kg/m²     Temp (24hrs), Av °F (36.7 °C), Min:97.9 °F (36.6 °C), Max:98.1 °F (36.7 °C)    No data found. No data found. No data found. Intake/Output Summary (Last 24 hours) at 2020 0720  Last data filed at 2020 0430  Gross per 24 hour   Intake 100 ml   Output 2750 ml   Net -2650 ml     General Appearance: Well developed, obese, no acute distress. Ears/Nose/Mouth/Throat:   Normal MM; anicteric. JVP: WNL   Resp:   Lungs clear to auscultation bilaterally. Nl resp effort. Cardiovascular:  IRRR, S1, S2 normal, no new murmur. No gallop or rub. Abdomen:   Soft, non-tender, bowel sounds are present. Extremities: trivial edema bilaterally. Chronic stasis. Skin:  Neuro: Warm and dry. A/O x3, grossly nonfocal       cath site intact w/o hematoma or new bruit; distal pulse unchanged  Yes   Data Review:     Telemetry none  sinus  chronic afib  parox afib  NSVT     ECG independently reviewed  NSR  afib  no significant changes  NSST-Tw chgs     x no new ECG provided for review   Lab results reviewed as noted below. Current medications reviewed as noted below. No results for input(s): PH, PCO2, PO2 in the last 72 hours. No results for input(s): CPK, CKMB, CKNDX, TROIQ in the last 72 hours.   Recent Labs     20  0251 20  0730 20  0424 20  0612    142  --   --    K 4.2 4.1  --   -- * 111*  --   --    CO2 27 27  --   --    BUN 33* 31*  --   --    CREA 1.63* 1.66*  --   --    GFRAA 38* 37*  --   --     144*  --   --    CA 9.2 9.2  --   --    WBC 6.7  --  5.8 5.1   HGB 10.3*  --  9.9* 9.6*   HCT 37.0  --  35.0 37.9     --  184 169     Lab Results   Component Value Date/Time    Cholesterol, total 123 08/10/2020 03:55 AM    HDL Cholesterol 56 08/10/2020 03:55 AM    LDL, calculated 53 08/10/2020 03:55 AM    Triglyceride 70 08/10/2020 03:55 AM    CHOL/HDL Ratio 2.2 08/10/2020 03:55 AM     No results for input(s): AP, TBIL, TP, ALB, GLOB, GGT, AML, LPSE in the last 72 hours. No lab exists for component: SGOT, GPT, AMYP, HLPSE  No results for input(s): INR, PTP, APTT, INREXT, INREXT in the last 72 hours.    No components found for: [place]    Current Facility-Administered Medications   Medication Dose Route Frequency    midazolam (PF) (VERSED) injection 10 mg  10 mg IntraVENous Multiple    fentaNYL citrate (PF) injection 200 mcg  200 mcg IntraVENous Multiple    doxycycline (VIBRAMYCIN) 100 mg in 0.9% sodium chloride (MBP/ADV) 100 mL MBP  100 mg IntraVENous Q12H    levoFLOXacin (LEVAQUIN) tablet 500 mg  500 mg Oral Q48H    bumetanide (BUMEX) tablet 1 mg  1 mg Oral DAILY    albuterol (PROVENTIL VENTOLIN) nebulizer solution 2.5 mg  2.5 mg Nebulization Q6H PRN    hydrALAZINE (APRESOLINE) tablet 50 mg  50 mg Oral BID    HYDROcodone-acetaminophen (NORCO) 5-325 mg per tablet 1 Tab  1 Tab Oral Q6H PRN    potassium chloride (K-DUR, KLOR-CON) SR tablet 20 mEq  20 mEq Oral TID    aluminum-magnesium hydroxide (MAALOX) oral suspension 15 mL  15 mL Oral QID PRN    fluticasone-vilanterol (BREO ELLIPTA) 200mcg-25mcg/puff  1 Puff Inhalation DAILY    aspirin chewable tablet 81 mg  81 mg Oral DAILY    atorvastatin (LIPITOR) tablet 40 mg  40 mg Oral QHS    calcium carbonate (OS-YEFRI) tablet 500 mg [elemental]  500 mg Oral DAILY    cholecalciferol (VITAMIN D3) (400 Units /10 mcg) tablet 1 Tab  400 Units Oral DAILY    clonazePAM (KlonoPIN) tablet 0.5 mg  0.5 mg Oral QHS    cyclobenzaprine (FLEXERIL) tablet 10 mg  10 mg Oral TID PRN    [Held by provider] dabigatran etexilate (PRADAXA) capsule 150 mg  150 mg Oral BID    diphenhydrAMINE (BENADRYL) capsule 25 mg  25 mg Oral BID PRN    isosorbide mononitrate ER (IMDUR) tablet 60 mg  60 mg Oral DAILY    lisinopriL (PRINIVIL, ZESTRIL) tablet 20 mg  20 mg Oral DAILY    metoprolol succinate (TOPROL-XL) XL tablet 100 mg  100 mg Oral DAILY    pantoprazole (PROTONIX) tablet 40 mg  40 mg Oral ACB    pregabalin (LYRICA) capsule 300 mg  300 mg Oral BID    traMADoL (ULTRAM) tablet 50 mg  50 mg Oral DAILY PRN    venlafaxine-SR (EFFEXOR-XR) capsule 150 mg  150 mg Oral DAILY WITH BREAKFAST    sodium chloride (NS) flush 5-40 mL  5-40 mL IntraVENous Q8H    sodium chloride (NS) flush 5-40 mL  5-40 mL IntraVENous PRN    acetaminophen (TYLENOL) tablet 650 mg  650 mg Oral Q6H PRN    polyethylene glycol (MIRALAX) packet 17 g  17 g Oral DAILY PRN    promethazine (PHENERGAN) tablet 12.5 mg  12.5 mg Oral Q6H PRN    Or    ondansetron (ZOFRAN) injection 4 mg  4 mg IntraVENous Q6H PRN    insulin lispro (HUMALOG) injection   SubCUTAneous AC&HS    glucose chewable tablet 16 g  4 Tab Oral PRN    dextrose (D50W) injection syrg 12.5-25 g  12.5-25 g IntraVENous PRN    glucagon (GLUCAGEN) injection 1 mg  1 mg IntraMUSCular PRN        Glory Dickinson MD

## 2020-12-23 NOTE — PROGRESS NOTES
Pharmacy Automatic Renal Dosing Protocol - Antimicrobials    Indication for Antimicrobials: Left Leg Cellulitis    Current Regimen of Each Antimicrobial:  Doxycycline 100 mg q12h x5 days (; through   Levofloxacin 750 mg q48h x7 days ( through     Previous Antimicrobial Therapy:        Significant Cultures:    MRSA positive in Wound suceptible to Tetracycline       Radiology / Imaging results: (X-ray, CT scan or MRI):     Paralysis, amputations, malnutrition:     Labs:  Recent Labs     20  0251 20  0730 204 20  0612   CREA 1.63* 1.66*  --   --    BUN 33* 31*  --   --    WBC 6.7  --  5.8 5.1     Temp (24hrs), Av °F (36.7 °C), Min:97.9 °F (36.6 °C), Max:98 °F (36.7 °C)      Is the Patient on Dialysis? Creatinine Clearance (mL/min):   CrCl (Actual Body Weight): 63.1  CrCl (Adjusted Body Weight): 46.0  CrCl (Ideal Body Weight): 34.5    Impression/Plan:   Change Levofloxacin to 750mg PO q48h for improved renal function  Continue Doxycycline 100mg IV q12h  Daily BMP  Antimicrobial stop date     Pharmacy will follow daily and adjust medications as appropriate for renal function and/or serum levels. Thank you,  Marva Ramirez [facility]    Recommended duration of therapy  [email or web address]/[place]/[facility]/Pharmacy/Clinical%20Companion/Duration%20of%[email or web address]. docx    Renal Dosing  Pharmacy Automatic Renal Dosing Protocol - Antimicrobials    Indication for Antimicrobials: Left Leg Cellulitis    Current Regimen of Each Antimicrobial:  Doxycycline 100 mg q12h x5 days (; through   Levofloxacin 750 mg q48h x7 days ( through     Previous Antimicrobial Therapy:        Significant Cultures:    MRSA positive in Wound suceptible to Tetracycline       Radiology / Imaging results: (X-ray, CT scan or MRI):     Paralysis, amputations, malnutrition:     Labs:  Recent Labs     20  0251 20  0730 20 2012   CREA 1.63* 1.66*  --   --    BUN 33* 31*  --   --    WBC 6.7  --  5.8 5.1     Temp (24hrs), Av °F (36.7 °C), Min:97.9 °F (36.6 °C), Max:98 °F (36.7 °C)      Is the Patient on Dialysis? Creatinine Clearance (mL/min):   CrCl (Actual Body Weight): 63.1  CrCl (Adjusted Body Weight): 46.0  CrCl (Ideal Body Weight): 34.5    Impression/Plan:   Change Levofloxacin to 750mg PO q48h for improved renal function  Continue Doxycycline 100mg IV q12h  Daily BMP  Antimicrobial stop date     Pharmacy will follow daily and adjust medications as appropriate for renal function and/or serum levels. Thank you,  Jeanette Covington [facility]    Recommended duration of therapy  http://[facility]/[place]/[facility]/Pharmacy/Clinical%20Companion/Duration%20of%20ABX%20therapy. docx    Renal Dosing  http://[facility]/[place]/[facility]/Pharmacy/Clinical%20Companion/Renal%[id number]. pdf

## 2020-12-23 NOTE — PROGRESS NOTES
End of Shift Note    Bedside shift change report given to Rosalina Hayward (oncoming nurse) by Tramaine Cedeno (offgoing nurse). Report included the following information SBAR, Kardex, Intake/Output and MAR    Shift worked:  7a-7p     Shift summary and any significant changes:    Pt remained stable throughout shift. Scheduled meds given, PRN meds given for pain, education provided. Hourly rounding completed, pt up x1 assist to the bathroom & chair, pt turns self. Pt worked with PT/OT today. Concerns for physician to address:       Zone phone for oncoming shift:          Activity:  Activity Level: Up with Assistance  Number times ambulated in hallways past shift: 1  Number of times OOB to chair past shift: 2    Cardiac:   Cardiac Monitoring: No      Cardiac Rhythm: Paced    Access:   Current line(s): PIV     Genitourinary:   Urinary status: voiding    Respiratory:   O2 Device: Room air  Chronic home O2 use?: NO  Incentive spirometer at bedside: NO     GI:  Last Bowel Movement Date: 12/20/20  Current diet:  DIET DIABETIC CONSISTENT CARB Regular  DIET ONE TIME MESSAGE  Passing flatus: YES  Tolerating current diet: YES       Pain Management:   Patient states pain is manageable on current regimen: YES    Skin:  Preet Score: 17  Interventions: turn team, speciality bed and float heels    Patient Safety:  Fall Score:  Total Score: 3  Interventions: bed/chair alarm and gripper socks  High Fall Risk: Yes    Length of Stay:  Expected LOS: - - -  Actual LOS: [facility]

## 2020-12-23 NOTE — PROGRESS NOTES
Problem: Falls - Risk of  Goal: *Absence of Falls  Description: Document Albino Messing Fall Risk and appropriate interventions in the flowsheet. Outcome: Progressing Towards Goal  Note: Fall Risk Interventions:  Mobility Interventions: Patient to call before getting OOB    Mentation Interventions: Bed/chair exit alarm, Adequate sleep, hydration, pain control    Medication Interventions: Patient to call before getting OOB    Elimination Interventions: Call light in reach              Problem: Pressure Injury - Risk of  Goal: *Prevention of pressure injury  Description: Document Preet Scale and appropriate interventions in the flowsheet. Outcome: Progressing Towards Goal  Note: Pressure Injury Interventions:  Sensory Interventions: Assess changes in LOC    Moisture Interventions: Absorbent underpads, Apply protective barrier, creams and emollients    Activity Interventions: Pressure redistribution bed/mattress(bed type), Increase time out of bed    Mobility Interventions: HOB 30 degrees or less    Nutrition Interventions: Document food/fluid/supplement intake    Friction and Shear Interventions: HOB 30 degrees or less                Problem: Risk for Spread of Infection  Goal: Prevent transmission of infectious organism to others  Description: Prevent the transmission of infectious organisms to other patients, staff members, and visitors.   Outcome: Progressing Towards Goal

## 2020-12-23 NOTE — PROGRESS NOTES
Problem: Self Care Deficits Care Plan (Adult)  Goal: *Acute Goals and Plan of Care (Insert Text)  Description:   FUNCTIONAL STATUS PRIOR TO ADMISSION: Patient was modified independent using a rollator and spc for functional mobility. STATES RECENTLY STRUGGLING WITH DONNING SOCKS/SHOES. HOME SUPPORT: The patient lived with spouse but did not require assist.    Occupational Therapy Goals  Initiated 12/19/2020; Re-evaluation from kyphoplasty 12/23/20; all goals remain appropriate  1. Patient will perform grooming at sink with independence within 7 day(s). 2.  Patient will perform lower body dressing with modified independence within 7 day(s). 3.  Patient will perform bathing with modified independence within 7 day(s). 4.  Patient will perform toilet transfers with modified independence within 7 day(s). 5.  Patient will perform all aspects of toileting with independence within 7 day(s). Outcome: Progressing Towards Goal   OCCUPATIONAL THERAPY RE-EVALUATION  Patient: Beatris Brunner (19 y.o. female)  Date: 12/23/2020  Diagnosis: Acute respiratory failure with hypoxemia ([facility]) [J96.01] <principal problem not specified>       Precautions: Fall, Contact, Spinal(kyphoplasty 12-22)  Chart, occupational therapy assessment, plan of care, and goals were reviewed. ASSESSMENT  Based on the objective data described below, patient presents with decreased independence in self-care and functional mobility secondary to POD 1 s/p kyphoplasty, general weakness, decreased endurance, and impaired standing balance. Patient continues to function below her baseline for self-care and functional mobility. Overall, patient is independent to min assist for self-care and SBA to min assist x2 for functional mobility using RW. Patient tolerated therapy well and reported pain had improved. Patient educated and demonstrated LB dressing using adaptive equipment (reacher, sock aid), log roll, and functional transfers.  Patient required min assist for log roll with verbal cues for sequencing. Patient continues to benefit from skilled OT services during acute hospital stay. Current Level of Function Impacting Discharge (ADLs): independent to min assist for ADLs. Other factors to consider for discharge: fall risk, good family support         PLAN :  Recommendations and Planned Interventions: self care training, functional mobility training, therapeutic exercise, balance training, therapeutic activities, endurance activities, patient education, home safety training, and family training/education    Frequency/Duration: Patient will be followed by occupational therapy 3 times a week to address goals. Recommendation for discharge: (in order for the patient to meet his/her long term goals)  Occupational therapy at least 2 days/week in the home AND ensure assist and/or supervision for safety with ADLs and functional mobility. This discharge recommendation:  Has been made in collaboration with the attending provider and/or case management    Equipment recommendations for successful discharge (if) home: sock aid, reacher       SUBJECTIVE:   Patient stated I feel better.     OBJECTIVE DATA SUMMARY:   Hospital course since last seen and reason for reevaluation: Patient had kyphoplasty     Cognitive/Behavioral Status:  Neurologic State: Alert  Orientation Level: Oriented X4  Cognition: Appropriate decision making; Appropriate for age attention/concentration; Appropriate safety awareness; Follows commands    Hearing: Auditory  Auditory Impairment: None    Vision/Perceptual:    Acuity: Within Defined Limits      Range of Motion:  AROM: Generally decreased, functional    Strength:  Strength: Generally decreased, functional    Coordination:  Coordination: Within functional limits  Fine Motor Skills-Upper: Left Intact; Right Intact    Gross Motor Skills-Upper: Left Intact; Right Intact    Tone & Sensation:  Tone: Normal  Sensation: Impaired(diabetic neuropathy )    Functional Mobility and Transfers for ADLs:  Bed Mobility:  Rolling: Minimum assistance;Assist x2  Supine to Sit: Minimum assistance;Assist x2  Scooting: Stand-by assistance    Transfers:  Sit to Stand: Minimum assistance;Assist x1  Bed to Chair: Contact guard assistance;Assist x1    Balance:  Sitting: Intact  Standing: Impaired; With support(RW)  Standing - Static: Good;Constant support  Standing - Dynamic : Fair;Constant support    ADL Assessment:  Feeding: Independent  Oral Facial Hygiene/Grooming: Setup;Supervision  Bathing: Minimum assistance  Upper Body Dressing: Setup;Supervision  Lower Body Dressing: Minimum assistance  Toileting: Minimum assistance    ADL Intervention and task modifications:  Grooming  Position Performed: Seated in chair  Washing Face: Set-up; Supervision  Cues: Verbal cues provided    Lower Body Dressing Assistance  Socks: Minimum assistance; Compensatory technique training  Leg Crossed Method Used: No  Position Performed: Seated edge of bed  Cues: Physical assistance; Tactile cues provided;Verbal cues provided  Adaptive Equipment Used: Sock aid;Reacher    Functional Measure:  Barthel Index:    Bathin  Bladder: 5  Bowels: 10  Groomin  Dressin  Feeding: 10  Mobility: 0  Stairs: 0  Toilet Use: 5  Transfer (Bed to Chair and Back): 10  Total: 50/100        The Barthel ADL Index: Guidelines  1. The index should be used as a record of what a patient does, not as a record of what a patient could do. 2. The main aim is to establish degree of independence from any help, physical or verbal, however minor and for whatever reason. 3. The need for supervision renders the patient not independent. 4. A patient's performance should be established using the best available evidence. Asking the patient, friends/relatives and nurses are the usual sources, but direct observation and common sense are also important. However direct testing is not needed.   5. Usually the patient's performance over the preceding 24-48 hours is important, but occasionally longer periods will be relevant. 6. Middle categories imply that the patient supplies over 50 per cent of the effort. 7. Use of aids to be independent is allowed. Leandrew Cellar., Barthel, D.W. (0610). Functional evaluation: the Barthel Index. [facility] (14)2. Katelynn Waterman femi ENRIQUE Hernandez, Arcenio Lira., Nati Jaimes., [place], [address] [place] [address] (1999). Measuring the change indisability after inpatient rehabilitation; comparison of the responsiveness of the Barthel Index and Functional [place] Measure. Journal of Neurology, Neurosurgery, and Psychiatry, 66(4), 866-534. LISHA ZhuJ.A, PAWAN Izaguirre, & Kelly Gary MGERRY. (2004.) Assessment of post-stroke quality of life in cost-effectiveness studies: The usefulness of the Barthel Index and the EuroQoL-5D. Quality of Life Research, 13, 427-43      Pain:  Patient c/o 2/10 back pain. Activity Tolerance:   Good    After treatment patient left in no apparent distress:   Sitting in chair and Call bell within reach    COMMUNICATION/COLLABORATION:   The patients plan of care was discussed with: Physical therapist and Registered nurse.      PRAVEEN Smiley/L  Time Calculation: 24 mins

## 2020-12-23 NOTE — PROGRESS NOTES
End of Shift Note    Bedside shift change report given to Divya (oncoming nurse) by Tammy Fulton RN (offgoing nurse). Report included the following information SBAR, Kardex and ED Summary    Shift worked:  7a-7p     Shift summary and any significant changes:     Pt had busy shift with kyphoplasty and getting up once for an attempt at [facility]. Pt pain better controlled under current meds. Concerns for physician to address:       Zone phone for oncoming shift:   [phone]       Activity:  Activity Level: Up with Assistance  Number times ambulated in hallways past shift: 0  Number of times OOB to chair past shift: 1    Cardiac:   Cardiac Monitoring: No      Cardiac Rhythm: Paced    Access:   Current line(s): PIV     Genitourinary:   Urinary status: external catheter    Respiratory:   O2 Device: Nasal cannula  Chronic home O2 use?: NO  Incentive spirometer at bedside: NO     GI:  Last Bowel Movement Date: 12/20/20  Current diet:  DIET DIABETIC CONSISTENT CARB Regular  DIET ONE TIME MESSAGE  Passing flatus: YES  Tolerating current diet: YES       Pain Management:   Patient states pain is manageable on current regimen: YES    Skin:  Preet Score: 18  Interventions: turn team    Patient Safety:  Fall Score:  Total Score: 3  Interventions: bed/chair alarm  High Fall Risk: Yes    Length of Stay:  Expected LOS: - - -  Actual LOS: Asif Haddad, RN

## 2020-12-24 VITALS
RESPIRATION RATE: 18 BRPM | DIASTOLIC BLOOD PRESSURE: 84 MMHG | SYSTOLIC BLOOD PRESSURE: 155 MMHG | HEIGHT: 69 IN | OXYGEN SATURATION: 98 % | WEIGHT: 266.76 LBS | BODY MASS INDEX: 39.51 KG/M2 | HEART RATE: 80 BPM | TEMPERATURE: 98.1 F

## 2020-12-24 LAB
ANION GAP SERPL CALC-SCNC: 5 MMOL/L (ref 5–15)
BUN SERPL-MCNC: 37 MG/DL (ref 6–20)
BUN/CREAT SERPL: 23 (ref 12–20)
CALCIUM SERPL-MCNC: 9.3 MG/DL (ref 8.5–10.1)
CHLORIDE SERPL-SCNC: 112 MMOL/L (ref 97–108)
CO2 SERPL-SCNC: 24 MMOL/L (ref 21–32)
CREAT SERPL-MCNC: 1.59 MG/DL (ref 0.55–1.02)
ERYTHROCYTE [DISTWIDTH] IN BLOOD BY AUTOMATED COUNT: 19.8 % (ref 11.5–14.5)
GLUCOSE BLD STRIP.AUTO-MCNC: 121 MG/DL (ref 65–100)
GLUCOSE SERPL-MCNC: 119 MG/DL (ref 65–100)
HCT VFR BLD AUTO: 35.6 % (ref 35–47)
HGB BLD-MCNC: 10.3 G/DL (ref 11.5–16)
MCH RBC QN AUTO: 24.3 PG (ref 26–34)
MCHC RBC AUTO-ENTMCNC: 28.9 G/DL (ref 30–36.5)
MCV RBC AUTO: 84 FL (ref 80–99)
NRBC # BLD: 0 K/UL (ref 0–0.01)
NRBC BLD-RTO: 0 PER 100 WBC
PLATELET # BLD AUTO: 191 K/UL (ref 150–400)
PMV BLD AUTO: 11.8 FL (ref 8.9–12.9)
POTASSIUM SERPL-SCNC: 4.3 MMOL/L (ref 3.5–5.1)
RBC # BLD AUTO: 4.24 M/UL (ref 3.8–5.2)
SERVICE CMNT-IMP: ABNORMAL
SODIUM SERPL-SCNC: 141 MMOL/L (ref 136–145)
WBC # BLD AUTO: 6.7 K/UL (ref 3.6–11)

## 2020-12-24 PROCEDURE — 74011250637 HC RX REV CODE- 250/637: Performed by: STUDENT IN AN ORGANIZED HEALTH CARE EDUCATION/TRAINING PROGRAM

## 2020-12-24 PROCEDURE — 74011250637 HC RX REV CODE- 250/637: Performed by: INTERNAL MEDICINE

## 2020-12-24 PROCEDURE — 80048 BASIC METABOLIC PNL TOTAL CA: CPT

## 2020-12-24 PROCEDURE — 36415 COLL VENOUS BLD VENIPUNCTURE: CPT

## 2020-12-24 PROCEDURE — 74011250637 HC RX REV CODE- 250/637: Performed by: NURSE PRACTITIONER

## 2020-12-24 PROCEDURE — 85027 COMPLETE CBC AUTOMATED: CPT

## 2020-12-24 PROCEDURE — 94760 N-INVAS EAR/PLS OXIMETRY 1: CPT

## 2020-12-24 PROCEDURE — 82962 GLUCOSE BLOOD TEST: CPT

## 2020-12-24 RX ORDER — HYDRALAZINE HYDROCHLORIDE 50 MG/1
50 TABLET, FILM COATED ORAL 2 TIMES DAILY
Qty: 30 TAB | Refills: 0 | Status: SHIPPED | OUTPATIENT
Start: 2020-12-24 | End: 2021-04-13 | Stop reason: SDUPTHER

## 2020-12-24 RX ORDER — LEVOFLOXACIN 750 MG/1
750 TABLET ORAL
Qty: 5 TAB | Refills: 0 | Status: SHIPPED | OUTPATIENT
Start: 2020-12-26 | End: 2021-01-07

## 2020-12-24 RX ORDER — BUMETANIDE 1 MG/1
1 TABLET ORAL DAILY
Qty: 30 TAB | Refills: 0 | Status: SHIPPED | OUTPATIENT
Start: 2020-12-25 | End: 2021-03-08 | Stop reason: ALTCHOICE

## 2020-12-24 RX ORDER — DOXYCYCLINE HYCLATE 100 MG
100 TABLET ORAL EVERY 12 HOURS
Qty: 20 TAB | Refills: 0 | Status: SHIPPED | OUTPATIENT
Start: 2020-12-24 | End: 2021-01-07

## 2020-12-24 RX ORDER — CALCIUM CARBONATE 500(1250)
1 TABLET ORAL DAILY
Qty: 30 TAB | Refills: 0 | Status: SHIPPED | OUTPATIENT
Start: 2020-12-25 | End: 2021-03-08 | Stop reason: ALTCHOICE

## 2020-12-24 RX ADMIN — VENLAFAXINE HYDROCHLORIDE 150 MG: 150 CAPSULE, EXTENDED RELEASE ORAL at 09:37

## 2020-12-24 RX ADMIN — LISINOPRIL 20 MG: 20 TABLET ORAL at 09:40

## 2020-12-24 RX ADMIN — HYDRALAZINE HYDROCHLORIDE 50 MG: 50 TABLET, FILM COATED ORAL at 09:38

## 2020-12-24 RX ADMIN — Medication 1 TABLET: at 09:38

## 2020-12-24 RX ADMIN — POTASSIUM CHLORIDE 20 MEQ: 20 TABLET, EXTENDED RELEASE ORAL at 09:38

## 2020-12-24 RX ADMIN — ASPIRIN 81 MG CHEWABLE TABLET 81 MG: 81 TABLET CHEWABLE at 09:40

## 2020-12-24 RX ADMIN — PANTOPRAZOLE SODIUM 40 MG: 40 TABLET, DELAYED RELEASE ORAL at 09:37

## 2020-12-24 RX ADMIN — BUMETANIDE 1 MG: 1 TABLET ORAL at 09:37

## 2020-12-24 RX ADMIN — DOXYCYCLINE HYCLATE 100 MG: 100 TABLET, COATED ORAL at 09:36

## 2020-12-24 RX ADMIN — METOPROLOL SUCCINATE 100 MG: 50 TABLET, EXTENDED RELEASE ORAL at 09:39

## 2020-12-24 RX ADMIN — PREGABALIN 300 MG: 150 CAPSULE ORAL at 09:38

## 2020-12-24 RX ADMIN — LEVOFLOXACIN 750 MG: 750 TABLET, FILM COATED ORAL at 09:36

## 2020-12-24 RX ADMIN — CALCIUM 500 MG: 500 TABLET ORAL at 09:36

## 2020-12-24 RX ADMIN — DABIGATRAN ETEXILATE MESYLATE 150 MG: 150 CAPSULE ORAL at 09:41

## 2020-12-24 NOTE — PROGRESS NOTES
Cardiology Progress Note  12/24/2020     Admit Date: 12/13/2020  Admit Diagnosis: Acute respiratory failure with hypoxemia (HCC) [J96.01]  CC: none currently  Cardiologist:  Dr Myrna Garcia. Cardiac Assessment/Plan:    1) CAD (Prox LAD ISELA 4/2014 for NQWMI), Neg PET for ischemia 9/2018. Min CAD 2/2019.  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 Samia Kayode was too expensive. *EF 15-20% 9/2018. EF 30% w/ minimal CAD at cath 2/2019. EF 20-25% (m-mod MR; mod TR) 8/2019. *EF 35-40% 8/2020 (admit for CHF: too much salt). 3) HTN,   4) AFIB: PAfib (RFA 4/2016 and 1/2017), Recurrent/persistent afib 2017/2018: AV node ablation 10/2018. *Chronic AC w/ pradaxa. 5) DM,   6) Dyslipidemia   7) CKD III: Aldactone stopped in 2014. Cr 2 11/2018 (after becky): Cr 1.5 12/2018 & 8/2019; Cr 1.4/gfr 39 12/2020 (Dr. Basilia Godwin). 8) St Don PPM 7/2014 for bradycardia while on therapy for AFib: changed to BiV ICD 10/2018. *adjustments made 12/22 by Dr Day Galloway (Multipoint pacing (MPP) algorithm enabled RV>LV1>LV2 with LV1 vector (M3-P4) and LV2 vector (D1-M2). Capture thresholds were good, no phrenic nerve stimulation. Changed mode from DDDR to VVIR due to permanent atrial fibrillation)  9) nephrolithiasis  10) SHAYLA (on CPAP),   11) cholecystectomy 11/2018.  12) LE venous insufficiency:  B SFV ablation 3/2020. Continues to use compression stockings/wraps. Obesity: 240# 2014; 262# 9/2019. 270 to 281# 2020. 279# 8/2020; 281# 12/2020.     Imp 12/14: stable cardiac status; not grossly overloaded;   Lymphedema compression pumps are pending as noted below. No plan for invasive cardiac testing; Dispo per primary team.      12/18: BPs 120-140s yesterday; HR 70-80s; 92% RA. No wt nor I/Os. Hg 9.4; Cr 1.37  Cardiac meds; asa 81; lipitor 40; bumex 2 PO qday; hydralazine 50 bid; Imdur 60; lisinopril 20; toprol ; (to stay off norvasc in case contributing to LE edema).  Lovneox 120 bid.    No new cardiac recs; stable cardiac status. Restart pradaxa when appropriate. Dispo/ortho per primary team.  No active cardiac issues; will sign-off; Please call if questions/issues. Routine f/u with me after d/c. Thanks. 12/23: BPs 90-130s; HR 80s; 95% CPAP (sleeping). Hg 10.3; Cr 1.63 (no change). Cardiac meds: asa 81; lipitor 40; bumex  1 PO qam; hydralazine 50 bid; imdur 60; lisinopril 20; toprol  qday; kcl 20 tid. Restart pradaxa per primary team.  Shankar Doniphan for now; BPs trending down; PRN NTG paste; As she starts to mobilize, may be orthostatic after prolonged bed rest here. Dispo per primary team.  Cardiac status remains stable. 12/24: BPs stable ( to 107 w/ standing yesterday, ASx). HR 70-80s. 94% RA. No I/Os yesterday. Hg 10.3; Cr 1.59  Cardiac meds: asa 81; lipitor 40; bumex  1 PO qam; pradaxa 150 bid; hydralazine 50 bid; imdur 60; lisinopril 20; toprol  qday; kcl 20 tid. Holding imdur for now; As she starts to mobilize, may be orthostatic after prolonged bed rest here. Dispo per primary team.  Cardiac status remains stable; ok for d/c from cardiac standpoint.  ____________________________________________________________________  La Cartagena is a 78 y.o. female presents with Acute respiratory failure with hypoxemia ([facility] [address] [J96.01].      As noted in H&P: \"PRESENT ILLNESS:     Nitza Luis is a 78 y.o.   female with past medical history significant for CHF, SHAYLA on CPAP, and spinal stenosis who presents with worsening of lower back pain over the last 3 weeks.  Patient has chronic lower back pain that is on and off for years.  She follows up with an orthopedic doctor and receives steroid injections almost every 2 months. Trenton Jett last dose was in October. Liam Flores gets some relief with the steroid injection but the pain often recurs.  Over the last 3 weeks, her lower back pain has gotten worse to the extent she was unable to walk or get out of bed.  The pain has been persistent, 10/10, and occasionally radiates to the right leg.  Worse with any kind of movement, no relieving factor.  She has tried different analgesics and muscle relaxants with no significant improvement.  She denies subjective fever, bowel/bladder incontinence, or lower extremity weakness.  Patient's  called EMS for worsening back pain.  Per EMS patient was febrile with a temp of 100.7.  No record of fever in the ED.  In the ED patient was noted to have a sat of 82% on room air.  Patient states she has chronic shortness of breath and gets winded even with walking short distance.  She follows up with her pulmonologist and she was recently prescribed a pulse oximeter to monitor her oxygen but she has not gotten it yet. Bruna Harper denies recent worsening of shortness of breath, cough, chest pain, myalgia, or fatigue.  She does have contact history with her son-in-law who has COVID infection.     Of note, patient also has left lower leg erythema and ulcer which started as a blister about a week ago. She was prescribed topical ointment/?antibiotics but hasn't seen any improvement. \"     ______________________________________________________________________     The patient reports no chest pain/pressure; no change in chronic dyspnea; no change in LE edema: plan for compression Rx as noted below.     No PND, orthopnea, palpitations, pre-syncope, syncope, peripheral edema, or decrease in exercise tolerance. No current complaints.      ECG: Afib; Vpac int. Tele: none  CXR: \"No acute findings. \" NO CHF. Notable labs: Neg COVID; Hg 9.1; Cr 1.38 from 1.57 from 1.4-1.5 range. Nl troponin x1; proBNP 1.8k  Nl TSH & LDL 53 8/2020.   _______________________________________________________________  Notable prior cardiac history:  @ OV 10/10/20:  Ms Elizabeth Ballard has a h/o:  1) CAD (Prox LAD ISELA 4/2014 for NQWMI), Neg PET for ischemia 9/2018. Min CAD 2/2019.  2) CM:  Mixed ischemic/tachycardia mediated CM 4/2014 (EF 20%); EF 45% 11/2017. Tennie Bread was too expensive. *EF 15-20% 9/2018. EF 30% w/ minimal CAD at cath 2/2019. EF 20-25% (m-mod MR; mod TR) 8/2019. *EF 35-40% 8/2020 (admit for CHF: too much salt). 3) HTN,   4) AFIB: PAfib (RFA 4/2016 and 1/2017), Recurrent/persistent afib 2017/2018: AV node ablation 10/2018. *Chronic AC w/ pradaxa. 5) DM,   6) Dyslipidemia   7) CKD III: Aldactone stopped in 2014. Cr 2 11/2018 (after becky): Cr 1.5 12/2018 & 8/2019; Cr 1.4/gfr 39 12/2020 (Dr. Gatito Yanes). 8) St Don PPM 7/2014 for bradycardia while on therapy for AFib: changed to BiV ICD 10/2018.  9) nephrolithiasis  10) SHAYLA (on CPAP),   11) cholecystectomy 11/2018.  12) LE venous insufficiency:  B SFV ablation 3/2020. Continues to use compression stockings/wraps. Obesity: 240# 2014; 262# 9/2019. 270 to 281# 2020. 279# 8/2020; 281# 12/2020.     7/2020:  No chest pain nor change in dyspnea. Using CPAP. Had palpitations x1 since last visit. No falling or bleeding.     8/2020:  Valdene Sales last week for CHF; too much sodium. Since discharge, no chest pain nor palpitations. No change in dyspnea; less LE edema overall, (never fully resolves). Occasional lightheaded if she gets up too quickly. Complains of continued fatigue but does not wish home PT.     12/2020:  No chest pain; decreasing dyspnea. More prominent LE edema/erythema; sees Dr. Daisy Cuellar later this week.     Patient presents today for 6mo follow up. Continues to have swelling and redness. Reported that she has been doing leg exercises. Patient could not but the compression stockings due to constant fluid leak from left lower extremity. Patient has tried conservative management with compression stocking, exercises and leg elevation for last 6 months. Subsequently patient underwent bilateral GSV ablation. She did not find much improvement in her symptoms.   Now patient is having hyperkeratosis of the skin on anterior aspect of the leg, oozing of fluid, she has stage III leg edema     IMPRESSION AND PLAN  01. Lymphedema, not elsewhere classified (I89.0):  Patient has been having chronic edema bilateral lower extremity. She has been following the conservative management with compression stocking, exercises and leg elevation for last 6 months. Subsequently she underwent bilateral GSV ablation. But did not help much with her swelling. Now she is having hyperkeratosis of the skin in the anterior aspect of the legs, chronic wounds, constant oozing of fluid. Part of her leg edema is because of lymphedema. At this point I think lymphedema compression pumps will help with her swelling. 02. Chronic venous insufficiency (I87.2): She has bilateral lower extremity edema also has signs of chronic venous stasis with discoloration of anterior aspect of legs. She also has congestive heart failure and chronic kidney disease. Her etiology for leg swelling is multifactorial.  She has undergone bilateral GSV RF ablation. She has not seen much improvement yet. I could part of it is because of her lymphedema. Will prescribe her lymphedema compression pumps. 03. Atherosclerotic heart disease of native coronary artery without angina pectoris (I25.10): Minimal CAD @ cath 2/2019.     We discussed the signs and symptoms of unstable angina, myocardial infarction and malignant arrhythmia. The patient knows to seek immediate medical attention should they occur. 04. Dilated cardiomyopathy (I42.0):  Mixed ischemic/non-ischemic (tachycardia mediated) CM previously. Her EF improved but then has worsened again as noted above. EF 30% 2/2019. The patient has an ICD in place. 05. Chronic combined systolic (congestive) and diastolic (congestive) heart failure (I50.42): The patient is compliant with their CHF regimen. is tolerating the current beta-blocker dose. 06. Longstanding persistent atrial fibrillation (I48.11): As per Dr. Chloe Villasenor, off amiodarone & now s/p AV node ablation.   She remains on anticoagulation. 07. Hyp hrt & chr kdny dis w hrt fail and stg 1-4/unsp chr kdny (I13.0):  Finally controlled with addition of Imdur. 08. Mixed hyperlipidemia (E78.2):  Lipid labs drawn by PCP. The patient is tolerating lipid lowering therapy well. 09. Chronic kidney disease, stage 3 (moderate) (N18.3):  Cr 1.24/GFR46 11/2015. Cr 1.32/GFR 43 1/2017. Cr 2 11/2018 after becky. Creatinine 1.5 12/2018. 10. Localized edema (R60.0):  Chronic. She avoids salt. Will evaluate for venous insufficiency. 11. Presence of automatic (implantable) cardiac defibrillator (Z95.810): This is a biventricular device. will continue to be followed in device clinic. 12. Type 2 diabetes mellitus with other specified complication (L10.40):  Lipids & HTN as noted above; DM managed by other MD.   13. Long term (current) use of aspirin (Z79.82): This condition is stable. 15. Long term (current) use of anticoagulants (Z79.01): This condition is stable. Indicated for atrial fibrillation. No bleeding. If she has recurrent falls, she knows to call for re-evaluation of anticoagulation.    15. Body mass index [BMI]40.0-44.9, adult (Z68.41)      ORDERS:  1 Dietary management education, guidance, and counseling     2 Return office visit with Jesús Monge MD in 6 Months.           12/10/10 MEDICATION LIST  Medication Sig Desc   acetaminophen 500 mg capsule take 2 capsule by oral route  every 6 hours as needed   albuterol sulfate HFA 90 mcg/actuation aerosol inhaler inhale 2 puff by inhalation route  every 4 - 6 hours as needed   amlodipine 10 mg tablet take 1 tablet by oral route  every day   aspirin 81 mg tablet,delayed release take 1 tablet by oral route  every day   ATORVASTATIN CALCIUM 40 MG Tablet TAKE 1 TABLET EVERY EVENING   Ayr Saline 0.65 % nasal spray aerosol     bumetanide 2 mg tablet take 1 tablet by oral route  every day   Claritin 10 mg tablet take 1 tablet by oral route  every day   clonazepam 0.5 mg tablet take 1 tablet by oral route  every day   clotrimazole-betamethasone 1 %-0.05 % topical cream apply by topical route 2 times every day for 2 weeks to the affected and surrounding areas of skin in the morning and evening   cyclobenzaprine 10 mg tablet take 1 tablet by oral route 2 times every day   glimepiride 2 mg tablet take 1 tablet by oral route  every day   hydralazine 25 mg tablet take 1 tablet by oral route 2 times every day with food   isosorbide mononitrate ER 30 mg tablet,extended release 24 hr take 1 tablet by oral route  twice a day   lidocaine 4 % topical cream     lisinopril 20 mg tablet take 1 tablet by oral route  every day   Lyrica 200 mg capsule take 1 capsule by oral route 2 times every day   magnesium 400 mg (as magnesium oxide) tablet take 1 tablet by oral route  every day   metoprolol succinate  mg tablet,extended release 24 hr TAKE 1 TABLET EVERY DAY   nystatin 100,000 unit/gram topical cream apply by topical route 2 times every day to the affected area(s) as needed   omeprazole 40 mg capsule,delayed release take 1 capsule by oral route  every day before a meal   potassium chloride ER 10 mEq capsule,extended release take 1 Capsule by oral route 3 times daily   Pradaxa 150 mg capsule take 1 capsule by oral route 2 times every day   Premarin 0.625 mg/gram vaginal cream insert 0.5 applicatorful by vaginal route  every day cyclically, 3 weeks on and 1 week off   Symbicort 160 mcg-4.5 mcg/actuation HFA aerosol inhaler inhale 2 puff by inhalation route 2 times every day in the morning and evening   Ultram 50 mg tablet take 1 tablet by oral route  every 6 hours as needed   venlafaxine  mg capsule,extended release 24 hr take 1 capsule by oral route 2 times every day   Vitamin D3 1,000 unit capsule take 1 daily         CARDIAC HISTORY  CAD:  1 NSTEMI [95% Proximal LAD, Resolute (ISELA) to the Proximal LAD.] - 4/8/2014      CHF/CM:  1 Mixed ischemic/tachycardic CM.  [Nl TSH.] - 4/2014   2 Ischemic & tachycardic Cardiomyopathy [Echo (EF 0.45) - 06/17/2014, (prev EF 20-30%)] - 6/2014   3 Progressive Cardiomyopathy [Cardiac PET (EF .16) - 09/24/2018, No ischemia noted. EF improved but not normalized; no CAD cath 2/2019.] - 9/2018      ARRHYTHMIA:  1 A Fib [DCCV, Plan: amio started; Eliquis with Effient (change eliquis to asa if NSR after 30 days). ] - 4/8/2014   2 PAF - 8/2010   3 A Fib, recurrent; and 6/2014. [Had marked sinus bradycardia while on bblocker/dig.] - 5/2014   4 Sinus Bradycardia. - 6/2/2014   5 PPM (Devices (Dual Chamber PPM (Juárez Benes)) - 7/17/2014) - 7/17/2014   6 PAF [CVR; Eliquis restarted (plavix stopped). ] - 9/2015   7 A Fib [RFA] - 4/2016   8 A Fib [RFA] - 1/2017   9 Chronic A Fib [PPM to BiV device, then AV node ablation. Amiodarone stopped. ] - 10/2018      RISK FACTORS:  1 Diabetes [Diagnosed in 2014]   2 Hypertension   3 Dyslipidemia   4 Tobacco Use: No/never         CARDIOVASCULAR PROCEDURES  Procedure Date Results   Cardiac PET 09/24/2018 EF 0.16 (16%), physiologic apical thinning with no ischemia   Cath 02/22/2019 Minimal CAD   Cath 04/08/2014 95% Proximal LAD, Resolute (ISELA) to the Proximal LAD. DCCV 04/08/2014 Initial Rhythm A Fib, Final Rhythm Sinus   Devices 02/08/2019 Bi-Ventricular ICD (St. Don), 100% Afib. BIV pacing >99% (prior AV node ablation)   Devices 07/17/2014 Dual Chamber PPM (St. [id number])   Echo 08/11/2020 LVE; EF 35-40%; normal RV.  minimal AS (mean 9). PAp 43; at [address] Echo 08/17/2019 EF 20-25%; mild-to-moderate MR. Moderate TR. Low normal RV function. At [address] Echo 02/06/2019 EF 0.30 (30%), Mild MR, Mild TR, Mild LVH, Aortic Sclerosis, LA Diam 3.9 cm, Est RVSP 40 mmHg, global hypokinesis, worse in the inferior wall & apex. Normal RV. Echo 10/08/2018 EF 0.15 (15%), Aortic Sclerosis, Mild MR, Est RVSP 23 mmHg, EF 15-20%; normal RV. At [address]    Echo 11/28/2017 EF 0.45 (45%), Mild Global Hypo, Mild TR, Mild LVH, Mild MR, Est RVSP 41 mmHg, Normal RV. (probable trileaflet AoV). Echo 10/30/2015 EF 0.45 (45%), Mild LVH, LA Diam 4.1 cm, Est RVSP 35 mmHg, Normal RV. ?bicuspid AoV; (Prob trileaflet on previous ANGELITO). Echo 06/17/2014 EF 0.45 (45%), EF range 45%-50%, Mild LV dilatation. Mild LV systolic dysfunction. ? Bicuspid AoV but prob trileafet on previous ANGELITO. Echo 04/03/2014 EF 0.20 (20%), global HK with lateral sparing; no valve disease. EKG 12/07/2020 Atrial Fib, Paced Rhythm   Holter 06/09/2014 No Malignant Arrhythmias, Atrial Fib, No correlation with symptoms, (, [address]   Holter 04/30/2014 No Malignant Arrhythmias, Atrial Fib, No correlation with symptoms, \"light or heavy chest\" = afib in 60s. HR  (ave 63). ASx pauses (upto 2.8) while asleep. RFA 01/19/2017 Indication A Fib, Final Rhythm Sinus   RFA   Indication A Fib   Sleep Study   OSAS: Moderate   ANGELITO 04/08/2014 EF 0.35 (35%), No BRAYDON Clot, prob trileaflet AoV. Venous Duplex 04/16/2020 Left:  1. Post venous procedure duplex exam shows no evidence of thrombus in the saphenofemoral junction. 2. Left mid calf positive  5mm in diameter and 2.8s in reflux.     Right:  Right distal calf positive  4.1mm in diameter and .53s in reflux. Venous Duplex 03/27/2020 Post venous ablation duplex exam shows no evidence of heat induced thrombus in the right common femoral vein. Venous Duplex 02/19/2020 Exam was conducted with patient in a high reverse-trendelenburg position. The (right/left/bilateral) great saphenous vein is incompetent (above/below the knee) (throughout the lower extremity) with retrograde flow >0.5seconds.        For other plans, see orders.   Hospital problem list     Active Hospital Problems    Diagnosis Date Noted    Obesity (BMI 30-39.9) 04/30/2019     Priority: 1 - One    Ischemic cardiomyopathy 11/13/2018     Priority: 1 - One    Stage 3 chronic kidney disease 11/13/2018     Priority: 1 - One    Hypercholesterolemia 01/22/2018 Priority: 1 - One    Lower back pain 2020    Hypoxia     Systolic CHF, chronic ([facility] 2016    Chronic atrial fibrillation ([facility] 2016    Type 2 diabetes mellitus with diabetic neuropathy, without long-term current use of insulin ([facility]     Acute systolic heart failure ([facility] 2014        Subjective: Melany Forte reports       Chest pain X none  consistent with:  Non-cardiac CP         Atypical CP     None now  On going  Anginal CP     Dyspnea X none  at rest  with exertion         improved  unchanged  worse              PND X none  overnight       Orthopnea X none  improved  unchanged  worse   Presyncope X none  improved  unchanged  worse     Ambulated in hallway without symptoms   Yes   Ambulated in room without symptoms  Yes   Objective:     Physical Exam:  Overall VSSAF;    Visit Vitals  /69 (BP 1 Location: Left arm, BP Patient Position: At rest)   Pulse 85   Temp 98.2 °F (36.8 °C)   Resp 17   Ht 5' 9\" (1.753 m)   Wt 121 kg (266 lb 12.1 oz)   SpO2 94%   Breastfeeding No   BMI 39.39 kg/m²     Temp (24hrs), Av.1 °F (36.7 °C), Min:98 °F (36.7 °C), Max:98.2 °F (36.8 °C)    No data found. No data found. No data found. No intake or output data in the 24 hours ending 20 0726  General Appearance: Well developed, obese, no acute distress. Ears/Nose/Mouth/Throat:   Normal MM; anicteric. JVP: WNL   Resp:   Lungs clear to auscultation bilaterally. Nl resp effort. Cardiovascular:  IRRR, S1, S2 normal, no new murmur. No gallop or rub. Abdomen:   Soft, non-tender, bowel sounds are present. Extremities: trivial edema bilaterally. Chronic stasis. Skin:  Neuro: Warm and dry.   A/O x3, grossly nonfocal       cath site intact w/o hematoma or new bruit; distal pulse unchanged  Yes   Data Review:     Telemetry none  sinus  chronic afib  parox afib  NSVT     ECG independently reviewed  NSR  afib  no significant changes  NSST-Tw chgs     x no new ECG provided for review   Lab results reviewed as noted below. Current medications reviewed as noted below. No results for input(s): PH, PCO2, PO2 in the last 72 hours. No results for input(s): CPK, CKMB, CKNDX, TROIQ in the last 72 hours. Recent Labs     12/24/20  0525 12/23/20  0251 12/22/20  0730 12/22/20  0424    142 142  --    K 4.3 4.2 4.1  --    * 110* 111*  --    CO2 24 27 27  --    BUN 37* 33* 31*  --    CREA 1.59* 1.63* 1.66*  --    GFRAA 39* 38* 37*  --    * 100 144*  --    CA 9.3 9.2 9.2  --    WBC 6.7 6.7  --  5.8   HGB 10.3* 10.3*  --  9.9*   HCT 35.6 37.0  --  35.0    205  --  184     Lab Results   Component Value Date/Time    Cholesterol, total 123 08/10/2020 03:55 AM    HDL Cholesterol 56 08/10/2020 03:55 AM    LDL, calculated 53 08/10/2020 03:55 AM    Triglyceride 70 08/10/2020 03:55 AM    CHOL/HDL Ratio 2.2 08/10/2020 03:55 AM     No results for input(s): AP, TBIL, TP, ALB, GLOB, GGT, AML, LPSE in the last 72 hours. No lab exists for component: SGOT, GPT, AMYP, HLPSE  No results for input(s): INR, PTP, APTT, INREXT, INREXT in the last 72 hours.    No components found for: GLPOC    Current Facility-Administered Medications   Medication Dose Route Frequency    levoFLOXacin (LEVAQUIN) tablet 750 mg  750 mg Oral Q48H    doxycycline (VIBRA-TABS) tablet 100 mg  100 mg Oral Q12H    midazolam (PF) (VERSED) injection 10 mg  10 mg IntraVENous Multiple    fentaNYL citrate (PF) injection 200 mcg  200 mcg IntraVENous Multiple    bumetanide (BUMEX) tablet 1 mg  1 mg Oral DAILY    albuterol (PROVENTIL VENTOLIN) nebulizer solution 2.5 mg  2.5 mg Nebulization Q6H PRN    hydrALAZINE (APRESOLINE) tablet 50 mg  50 mg Oral BID    HYDROcodone-acetaminophen (NORCO) 5-325 mg per tablet 1 Tab  1 Tab Oral Q6H PRN    potassium chloride (K-DUR, KLOR-CON) SR tablet 20 mEq  20 mEq Oral TID    aluminum-magnesium hydroxide (MAALOX) oral suspension 15 mL  15 mL Oral QID PRN    fluticasone-vilanterol (BREO ELLIPTA) 200mcg-25mcg/puff  1 Puff Inhalation DAILY    aspirin chewable tablet 81 mg  81 mg Oral DAILY    atorvastatin (LIPITOR) tablet 40 mg  40 mg Oral QHS    calcium carbonate (OS-YEFRI) tablet 500 mg [elemental]  500 mg Oral DAILY    cholecalciferol (VITAMIN D3) (400 Units /10 mcg) tablet 1 Tab  400 Units Oral DAILY    clonazePAM (KlonoPIN) tablet 0.5 mg  0.5 mg Oral QHS    cyclobenzaprine (FLEXERIL) tablet 10 mg  10 mg Oral TID PRN    dabigatran etexilate (PRADAXA) capsule 150 mg  150 mg Oral BID    diphenhydrAMINE (BENADRYL) capsule 25 mg  25 mg Oral BID PRN    lisinopriL (PRINIVIL, ZESTRIL) tablet 20 mg  20 mg Oral DAILY    metoprolol succinate (TOPROL-XL) XL tablet 100 mg  100 mg Oral DAILY    pantoprazole (PROTONIX) tablet 40 mg  40 mg Oral ACB    pregabalin (LYRICA) capsule 300 mg  300 mg Oral BID    traMADoL (ULTRAM) tablet 50 mg  50 mg Oral DAILY PRN    venlafaxine-SR (EFFEXOR-XR) capsule 150 mg  150 mg Oral DAILY WITH BREAKFAST    sodium chloride (NS) flush 5-40 mL  5-40 mL IntraVENous Q8H    sodium chloride (NS) flush 5-40 mL  5-40 mL IntraVENous PRN    acetaminophen (TYLENOL) tablet 650 mg  650 mg Oral Q6H PRN    polyethylene glycol (MIRALAX) packet 17 g  17 g Oral DAILY PRN    promethazine (PHENERGAN) tablet 12.5 mg  12.5 mg Oral Q6H PRN    Or    ondansetron (ZOFRAN) injection 4 mg  4 mg IntraVENous Q6H PRN    insulin lispro (HUMALOG) injection   SubCUTAneous AC&HS    glucose chewable tablet 16 g  4 Tab Oral PRN    dextrose (D50W) injection syrg 12.5-25 g  12.5-25 g IntraVENous PRN    glucagon (GLUCAGEN) injection 1 mg  1 mg IntraMUSCular PRN        Rex Garcia MD

## 2020-12-24 NOTE — PROGRESS NOTES
Left lower extremity venous stasis wounds: daily dressing changes cleanse with Normal Saline. Cover with a piece of Xeroform gauze, ABD pad and secure with gauze jared wrap.

## 2020-12-24 NOTE — PROGRESS NOTES
Pt was given discharge paperwork & told where to  prescriptions, pt &  verbalized understanding. Pts IV removed. Pt wheeled to the front of the hospital and discharged home with .

## 2020-12-24 NOTE — PROGRESS NOTES
VISHNU Plan:  Home with spouse  Spouse will transport home at d/c  Home with [address] accepted referral on 12/24/2020  2nd IMM letter  PCP follow up on 1/4/2020. See AVS for details. CM aware of discharge order. Met with pt and spouse at the bedside this AM to confirm d/c plan. Discussed recommendation for [facility] post discharge. Pt is agreeable. With no preference of agency indicated at this time, referral sent to [facility]  [employer], and [facility] via [facility]. [facility] able to accept pt for [facility] [place] services. Pt prefers start of care to take place on 12/26/2020 due to [place] being tomorrow. [facility] letter signed by pt. Medicare pt has received, reviewed, and signed 2nd  letter informing them of their right to appeal the discharge. Signed copy has been placed on pt bedside chart. Pt was provided with a copy of letter to keep. Spouse at bedside and will transport pt home at time of discharge. Pt has all required DME needed at home already. Pt has follow up with PCP on 1/4/2020. See AVS for details. No barriers to discharge identified at this time. Pt is ready for d/c from a CM standpoint. Assigned RN informed. Care Management Interventions  PCP Verified by CM: Yes(Dr. Rafaela Fowler )  Last Visit to PCP: 11/25/20  Palliative Care Criteria Met (RRAT>21 & CHF Dx)?: No  Mode of Transport at Discharge:  Other (see comment)(spouse )  Transition of Care Consult (CM Consult): Discharge Planning  Discharge Durable Medical Equipment: No  Physical Therapy Consult: Yes  Occupational Therapy Consult: Yes  Speech Therapy Consult: No  Current Support Network: Lives with Spouse, Own Home  Confirm Follow Up Transport: Family  The Plan for Transition of Care is Related to the Following Treatment Goals : Home health   The Patient and/or Patient Representative was Provided with a Choice of Provider and Agrees with the Discharge Plan?: Yes  Freedom of Choice List was Provided with Basic Dialogue that Supports the Patient's Individualized Plan of Care/Goals, Treatment Preferences and Shares the Quality Data Associated with the Providers?: Yes   Resource Information Provided?: No  Discharge Location  Discharge Placement: Home with home health([address] )    Corey Ambrose, [facility] [place], [address] [place] [facility]  [phone]

## 2020-12-24 NOTE — PROGRESS NOTES
Problem: Falls - Risk of  Goal: *Absence of Falls  Description: Document Booker Apo Fall Risk and appropriate interventions in the flowsheet. Outcome: Progressing Towards Goal  Note: Fall Risk Interventions:  Mobility Interventions: Patient to call before getting OOB    Mentation Interventions: Adequate sleep, hydration, pain control    Medication Interventions: Patient to call before getting OOB    Elimination Interventions: Call light in reach              Problem: Pressure Injury - Risk of  Goal: *Prevention of pressure injury  Description: Document Preet Scale and appropriate interventions in the flowsheet. Outcome: Progressing Towards Goal  Note: Pressure Injury Interventions:  Sensory Interventions: Assess changes in LOC    Moisture Interventions: Absorbent underpads, Apply protective barrier, creams and emollients    Activity Interventions: Pressure redistribution bed/mattress(bed type), Increase time out of bed    Mobility Interventions: HOB 30 degrees or less, Pressure redistribution bed/mattress (bed type)    Nutrition Interventions: Document food/fluid/supplement intake    Friction and Shear Interventions: HOB 30 degrees or less, Lift sheet                Problem: Risk for Spread of Infection  Goal: Prevent transmission of infectious organism to others  Description: Prevent the transmission of infectious organisms to other patients, staff members, and visitors.   Outcome: Progressing Towards Goal

## 2020-12-24 NOTE — PROGRESS NOTES
Bedside shift change report given to DIAN Torres (oncoming nurse) by Harry Soto RN (offgoing nurse). Report included the following information SBAR, Kardex, ED Summary, Procedure Summary, Intake/Output, MAR and Recent Results.

## 2020-12-24 NOTE — DISCHARGE INSTRUCTIONS
Patient Education        Cellulitis: Care Instructions  Your Care Instructions     Cellulitis is a skin infection caused by bacteria, most often strep or staph. It often occurs after a break in the skin from a scrape, cut, bite, or puncture, or after a rash. Cellulitis may be treated without doing tests to find out what caused it. But your doctor may do tests, if needed, to look for a specific bacteria, like methicillin-resistant Staphylococcus aureus (MRSA). The doctor has checked you carefully, but problems can develop later. If you notice any problems or new symptoms, get medical treatment right away. Follow-up care is a key part of your treatment and safety. Be sure to make and go to all appointments, and call your doctor if you are having problems. It's also a good idea to know your test results and keep a list of the medicines you take. How can you care for yourself at home? · Take your antibiotics as directed. Do not stop taking them just because you feel better. You need to take the full course of antibiotics. · Prop up the infected area on pillows to reduce pain and swelling. Try to keep the area above the level of your heart as often as you can. · If your doctor told you how to care for your wound, follow your doctor's instructions. If you did not get instructions, follow this general advice:  ? Wash the wound with clean water 2 times a day. Don't use hydrogen peroxide or alcohol, which can slow healing. ? You may cover the wound with a thin layer of petroleum jelly, such as Vaseline, and a nonstick bandage. ? Apply more petroleum jelly and replace the bandage as needed. · Be safe with medicines. Take pain medicines exactly as directed. ? If the doctor gave you a prescription medicine for pain, take it as prescribed. ? If you are not taking a prescription pain medicine, ask your doctor if you can take an over-the-counter medicine.   To prevent cellulitis in the future  · Try to prevent cuts, scrapes, or other injuries to your skin. Cellulitis most often occurs where there is a break in the skin. · If you get a scrape, cut, mild burn, or bite, wash the wound with clean water as soon as you can to help avoid infection. Don't use hydrogen peroxide or alcohol, which can slow healing. · If you have swelling in your legs (edema), support stockings and good skin care may help prevent leg sores and cellulitis. · Take care of your feet, especially if you have diabetes or other conditions that increase the risk of infection. Wear shoes and socks. Do not go barefoot. If you have athlete's foot or other skin problems on your feet, talk to your doctor about how to treat them. When should you call for help? Call your doctor now or seek immediate medical care if:    · You have signs that your infection is getting worse, such as:  ? Increased pain, swelling, warmth, or redness. ? Red streaks leading from the area. ? Pus draining from the area. ? A fever.     · You get a rash. Watch closely for changes in your health, and be sure to contact your doctor if:    · You do not get better as expected. Where can you learn more? Go to http://www.gray.com/  Enter X309 in the search box to learn more about \"Cellulitis: Care Instructions. \"  Current as of: July 2, 2020               Content Version: 12.6  © 5957-6488 [employer]. Care instructions adapted under license by [employer] (which disclaims liability or warranty for this information). If you have questions about a medical condition or this instruction, always ask your healthcare professional. [place] [address] any warranty or liability for your use of this information.        [facility]  Department of Interventional Radiology  [phone]    Radiologist: Dr Tanner Trimble    Date: 12/22/2020       Kyphoplasty Discharge Instructions    Go home and rest  and restrict your activity the next 24 hours. You have been given sedating medications, so do not drive or drink alcohol today. Resume your previous diet and medications. You may take Tylenol, as directed on the label, for pain or discomfort. Avoid Ibuprofen (Advil, Motrin etc.) and Aspirin today as they may increase your risk of bleeding. Avoid heavy lifting (nothing greater than 5 pounds),  excessive bending,  and pushing or pulling movements for one week. Then begin to advance your activity as tolerated. Be sure to follow up with your physician, and let him know how you are progressing. If a biopsy sample was collected, your results will be sent to your ordering physician. If you have new severe pain, numbness, tingling, or weakness in your legs go to the nearest emergency department, and tell them you have had a Kyphoplasty. Patient Discharge Instructions     Pt Name  Arturo Moseley   Date of Birth 1950   Age  78 y.o. Medical Record Number  [id number]   PCP Janelle Rivero MD    Admit date:  12/13/2020 @    [place] [address]    Room Number  7896/08   Date of Discharge 12/24/2020     Admission Diagnoses:     <principal problem not specified>          Allergies   Allergen Reactions    Sulfa (Sulfonamide Antibiotics) Swelling    Amoxicillin Swelling        You were admitted to [address] for  <principal problem not specified>    YOUR OTHER MEDICAL DIAGNOSES INCLUDE (BUT NOT LIMITED TO ):  Present on Admission:   Lower back pain   Hypoxia   Systolic CHF, chronic (HCC)   Hypercholesterolemia   Chronic atrial fibrillation ([facility] [address]   Ischemic cardiomyopathy   Type 2 diabetes mellitus with diabetic neuropathy, without long-term current use of insulin (HCC)   Obesity (BMI 30-39. 9)   Acute systolic heart failure (HCC)   Stage 3 chronic kidney disease      DIET: Diabetic Diet  Oral Nutritional Supplements: Glucerna  Supplement Frequency: Once daily    Recommended activity: Activity as tolerated  Follow up : Follow-up Information     Follow up With Specialties Details Why Contact Info    Araceli Smart MD Internal Medicine Go on 1/4/2021 9:30AM in office visit  [address]      [address] Call in 1 day this is your home health provider. Please call if you have not heard from them within 24 hours of discharge. [address] [facility], [facility]    Sherri Quan MD Cardiology In 6 months  [facility]  [address]  [address]  [phone]             Left lower extremity venous stasis wounds: daily dressing changes cleanse with Normal Saline. Cover with a piece of Xeroform gauze, ABD pad and secure with gauze jared wrap. · It is important that you take the medication exactly as they are prescribed. · Keep your medication in the bottles provided by the pharmacist and keep a list of the medication names, dosages, and times to be taken in your wallet. · Do not take other medications without consulting your doctor. ADDITIONAL INFORMATION: If you experience any of the following symptoms or have any health problem not listed below, then please call your primary care physician or return to the emergency room if you cannot get hold of your doctor: Fever, chills, nausea, vomiting, diarrhea, change in mentation, falling, bleeding, shortness of breath. I understand that if any problems occur once I am discharged, I am supposed to call my Primary care physician for further care or seek help in the Emergency Department at the nearest Healthcare facility. I have had an opportunity to discuss my clinical issues with my doctor and nursing staff. I understand and acknowledge receipt of the above instructions. Physician's or R.N.'s Signature                                                            Date/Time                                                                                                                                              Patient or Representative Signature                                                 Date/Time

## 2020-12-28 ENCOUNTER — PATIENT OUTREACH (OUTPATIENT)
Dept: CASE MANAGEMENT | Age: 70
End: 2020-12-28

## 2020-12-28 ENCOUNTER — TELEPHONE (OUTPATIENT)
Dept: INTERNAL MEDICINE CLINIC | Age: 70
End: 2020-12-28

## 2020-12-28 NOTE — TELEPHONE ENCOUNTER
Bonnie Ruiz wanted to make you aware they are seeing your patient starting on Andres day.     Kemar Arias [phone] Sandra

## 2020-12-28 NOTE — PROGRESS NOTES
Patient was admitted to [facility] on 2020 and discharged on 2020 for low back pain. Outreach made within 2 business days of discharge: Yes    Top Discharge Challenges to be reviewed by the provider   Additional needs identified to be addressed with provider no  home health care- [facility]  Discussed COVID-19 related testing which was available at this time. Test results were negative. Patient informed of results, if available? no   Method of communication with provider : none       Advance Care Planning:   Does patient have an Advance Directive:  yes; reviewed and current     Inpatient Readmission Risk score:   Was this a readmission? yes   Patient stated reason for the admission: back pain  Patients top risk factors for readmission: level of motivation and medical condition  Interventions to address risk factors: schedule and attend    Care Transition Nurse (CTN) contacted the patient by telephone to perform post hospital discharge assessment. Verified name and  with patient as identifiers. Provided introduction to self, and explanation of the CTN role. CTN reviewed discharge instructions, medical action plan and red flags with patient who verbalized understanding. Patient given an opportunity to ask questions and does not have any further questions or concerns at this time. The patient agrees to contact the PCP office for questions related to their healthcare. Medication reconciliation was performed with patient, who verbalizes understanding of administration of home medications. Advised obtaining a 90-day supply of all daily and as-needed medications.    Referral to Pharm D needed: no     Home Health/Outpatient orders at discharge: home health care, PT and [facility]: [facility]  Date of initial visit: 2020    Durable Medical Equipment ordered at discharge: none  [facility] [address] received: na    Covid Risk Education    Patient has following risk factors of: heart failure, diabetes and chronic kidney disease. Education provided regarding infection prevention, and signs and symptoms of COVID-19 and when to seek medical attention with patient who verbalized understanding. Discussed exposure protocols and quarantine From CDC: Are you at higher risk for severe illness?  and given an opportunity for questions and concerns. The patient agrees to contact the COVID-19 hotline [phone] or PCP office for questions related to COVID-19. For more information on steps you can take to protect yourself, see CDC's How to Protect Yourself     Patient/family/caregiver given information for [employer] and agrees to enroll na  Patient's preferred e-mail: na  Patient's preferred phone number: na    Discussed follow-up appointments. If no appointment was previously scheduled, appointment scheduling offered: [facility] [place] [place](s):   Future Appointments   Date Time Provider Tammy Perales   1/4/2021  9:30 AM Sofia Pham MD [facility]   6/11/2021 11:30 AM CHAIR 2 [address]     Non-[facility] follow up appointment(s):   Plan for follow-up call in 7-10 days based on severity of symptoms and risk factors. CTN provided contact information for future needs. Goals Addressed                 This Visit's Progress     Prevent complications post hospitalization. 12/28/2020 Patient reports pain control, taking ABX as prescribed, [facility] 12/25/2020, PCP 1/4/21. Endorses low Na diet and surgical restrictions. Denies chest pain, SOB, fever, N/V/D. Patient will monitor CHF S&S, complete ABX, and report attendance of appointment at next week outreach.  CLARENCE

## 2020-12-29 ENCOUNTER — TELEPHONE (OUTPATIENT)
Dept: INTERNAL MEDICINE CLINIC | Age: 70
End: 2020-12-29

## 2020-12-29 RX ORDER — HYDROCORTISONE 25 MG/G
CREAM TOPICAL 2 TIMES DAILY
Qty: 30 G | Refills: 0 | Status: SHIPPED | OUTPATIENT
Start: 2020-12-29 | End: 2021-03-08 | Stop reason: ALTCHOICE

## 2020-12-29 NOTE — TELEPHONE ENCOUNTER
Patient states she has hemorrhoids that are bleeding. Is there an over the counter med she can you or call in a prescription? Please advise.      [phone]

## 2020-12-29 NOTE — TELEPHONE ENCOUNTER
Please send pended Rx to pharmacy  Requested Prescriptions     Pending Prescriptions Disp Refills    hydrocortisone (ANUSOL-HC) 2.5 % rectal cream 30 g 0     Sig: Insert  into rectum two (2) times a day.

## 2021-01-01 ENCOUNTER — APPOINTMENT (OUTPATIENT)
Dept: GENERAL RADIOLOGY | Age: 71
DRG: 981 | End: 2021-01-01
Attending: EMERGENCY MEDICINE
Payer: MEDICARE

## 2021-01-01 ENCOUNTER — HOSPITAL ENCOUNTER (INPATIENT)
Age: 71
LOS: 6 days | Discharge: HOME HEALTH | DRG: 981 | End: 2021-01-07
Attending: EMERGENCY MEDICINE | Admitting: STUDENT IN AN ORGANIZED HEALTH CARE EDUCATION/TRAINING PROGRAM
Payer: MEDICARE

## 2021-01-01 ENCOUNTER — APPOINTMENT (OUTPATIENT)
Dept: CT IMAGING | Age: 71
DRG: 981 | End: 2021-01-01
Attending: EMERGENCY MEDICINE
Payer: MEDICARE

## 2021-01-01 ENCOUNTER — APPOINTMENT (OUTPATIENT)
Dept: CT IMAGING | Age: 71
DRG: 981 | End: 2021-01-01
Attending: STUDENT IN AN ORGANIZED HEALTH CARE EDUCATION/TRAINING PROGRAM
Payer: MEDICARE

## 2021-01-01 DIAGNOSIS — N17.9 AKI (ACUTE KIDNEY INJURY) (HCC): ICD-10-CM

## 2021-01-01 DIAGNOSIS — R41.0 DISORIENTATION: ICD-10-CM

## 2021-01-01 DIAGNOSIS — E16.2 HYPOGLYCEMIA: Primary | ICD-10-CM

## 2021-01-01 PROBLEM — R53.83 LETHARGY: Status: ACTIVE | Noted: 2021-01-01

## 2021-01-01 LAB
ALBUMIN SERPL-MCNC: 2.4 G/DL (ref 3.5–5)
ALBUMIN/GLOB SERPL: 0.8 {RATIO} (ref 1.1–2.2)
ALP SERPL-CCNC: 176 U/L (ref 45–117)
ALT SERPL-CCNC: 18 U/L (ref 12–78)
ANION GAP SERPL CALC-SCNC: 3 MMOL/L (ref 5–15)
AST SERPL-CCNC: 19 U/L (ref 15–37)
BASOPHILS # BLD: 0 K/UL (ref 0–0.1)
BASOPHILS NFR BLD: 0 % (ref 0–1)
BILIRUB SERPL-MCNC: 0.4 MG/DL (ref 0.2–1)
BUN SERPL-MCNC: 39 MG/DL (ref 6–20)
BUN/CREAT SERPL: 25 (ref 12–20)
CALCIUM SERPL-MCNC: 8.4 MG/DL (ref 8.5–10.1)
CHLORIDE SERPL-SCNC: 112 MMOL/L (ref 97–108)
CO2 SERPL-SCNC: 26 MMOL/L (ref 21–32)
CREAT SERPL-MCNC: 1.58 MG/DL (ref 0.55–1.02)
DIFFERENTIAL METHOD BLD: ABNORMAL
EOSINOPHIL # BLD: 0.1 K/UL (ref 0–0.4)
EOSINOPHIL NFR BLD: 1 % (ref 0–7)
ERYTHROCYTE [DISTWIDTH] IN BLOOD BY AUTOMATED COUNT: 18.6 % (ref 11.5–14.5)
GLOBULIN SER CALC-MCNC: 3.2 G/DL (ref 2–4)
GLUCOSE BLD STRIP.AUTO-MCNC: 107 MG/DL (ref 65–100)
GLUCOSE BLD STRIP.AUTO-MCNC: 116 MG/DL (ref 65–100)
GLUCOSE BLD STRIP.AUTO-MCNC: 122 MG/DL (ref 65–100)
GLUCOSE BLD STRIP.AUTO-MCNC: 133 MG/DL (ref 65–100)
GLUCOSE BLD STRIP.AUTO-MCNC: 136 MG/DL (ref 65–100)
GLUCOSE BLD STRIP.AUTO-MCNC: 142 MG/DL (ref 65–100)
GLUCOSE BLD STRIP.AUTO-MCNC: 43 MG/DL (ref 65–100)
GLUCOSE BLD STRIP.AUTO-MCNC: 45 MG/DL (ref 65–100)
GLUCOSE BLD STRIP.AUTO-MCNC: 56 MG/DL (ref 65–100)
GLUCOSE BLD STRIP.AUTO-MCNC: 56 MG/DL (ref 65–100)
GLUCOSE BLD STRIP.AUTO-MCNC: 59 MG/DL (ref 65–100)
GLUCOSE BLD STRIP.AUTO-MCNC: 66 MG/DL (ref 65–100)
GLUCOSE BLD STRIP.AUTO-MCNC: 68 MG/DL (ref 65–100)
GLUCOSE BLD STRIP.AUTO-MCNC: 71 MG/DL (ref 65–100)
GLUCOSE BLD STRIP.AUTO-MCNC: 86 MG/DL (ref 65–100)
GLUCOSE BLD STRIP.AUTO-MCNC: 99 MG/DL (ref 65–100)
GLUCOSE SERPL-MCNC: 248 MG/DL (ref 65–100)
HCT VFR BLD AUTO: 31.7 % (ref 35–47)
HGB BLD-MCNC: 9 G/DL (ref 11.5–16)
IMM GRANULOCYTES # BLD AUTO: 0.1 K/UL (ref 0–0.04)
IMM GRANULOCYTES NFR BLD AUTO: 1 % (ref 0–0.5)
LIPASE SERPL-CCNC: 46 U/L (ref 73–393)
LYMPHOCYTES # BLD: 0.8 K/UL (ref 0.8–3.5)
LYMPHOCYTES NFR BLD: 11 % (ref 12–49)
MAGNESIUM SERPL-MCNC: 1.8 MG/DL (ref 1.6–2.4)
MCH RBC QN AUTO: 23.5 PG (ref 26–34)
MCHC RBC AUTO-ENTMCNC: 28.4 G/DL (ref 30–36.5)
MCV RBC AUTO: 82.8 FL (ref 80–99)
MONOCYTES # BLD: 0.4 K/UL (ref 0–1)
MONOCYTES NFR BLD: 5 % (ref 5–13)
NEUTS SEG # BLD: 6 K/UL (ref 1.8–8)
NEUTS SEG NFR BLD: 82 % (ref 32–75)
NRBC # BLD: 0 K/UL (ref 0–0.01)
NRBC BLD-RTO: 0 PER 100 WBC
PLATELET # BLD AUTO: 237 K/UL (ref 150–400)
PMV BLD AUTO: 11.3 FL (ref 8.9–12.9)
POTASSIUM SERPL-SCNC: 3.7 MMOL/L (ref 3.5–5.1)
PROT SERPL-MCNC: 5.6 G/DL (ref 6.4–8.2)
RBC # BLD AUTO: 3.83 M/UL (ref 3.8–5.2)
RBC MORPH BLD: ABNORMAL
RBC MORPH BLD: ABNORMAL
SERVICE CMNT-IMP: ABNORMAL
SERVICE CMNT-IMP: NORMAL
SODIUM SERPL-SCNC: 141 MMOL/L (ref 136–145)
TROPONIN I SERPL-MCNC: <0.05 NG/ML
TSH SERPL DL<=0.05 MIU/L-ACNC: 0.6 UIU/ML (ref 0.36–3.74)
WBC # BLD AUTO: 7.4 K/UL (ref 3.6–11)

## 2021-01-01 PROCEDURE — 84484 ASSAY OF TROPONIN QUANT: CPT

## 2021-01-01 PROCEDURE — 74011000258 HC RX REV CODE- 258: Performed by: EMERGENCY MEDICINE

## 2021-01-01 PROCEDURE — 74011000250 HC RX REV CODE- 250: Performed by: STUDENT IN AN ORGANIZED HEALTH CARE EDUCATION/TRAINING PROGRAM

## 2021-01-01 PROCEDURE — 71045 X-RAY EXAM CHEST 1 VIEW: CPT

## 2021-01-01 PROCEDURE — 83690 ASSAY OF LIPASE: CPT

## 2021-01-01 PROCEDURE — 83735 ASSAY OF MAGNESIUM: CPT

## 2021-01-01 PROCEDURE — 74011000250 HC RX REV CODE- 250: Performed by: EMERGENCY MEDICINE

## 2021-01-01 PROCEDURE — 77030038269 HC DRN EXT URIN PURWCK BARD -A

## 2021-01-01 PROCEDURE — 96376 TX/PRO/DX INJ SAME DRUG ADON: CPT

## 2021-01-01 PROCEDURE — 96365 THER/PROPH/DIAG IV INF INIT: CPT

## 2021-01-01 PROCEDURE — 84443 ASSAY THYROID STIM HORMONE: CPT

## 2021-01-01 PROCEDURE — 74011250636 HC RX REV CODE- 250/636: Performed by: EMERGENCY MEDICINE

## 2021-01-01 PROCEDURE — 80053 COMPREHEN METABOLIC PANEL: CPT

## 2021-01-01 PROCEDURE — 72131 CT LUMBAR SPINE W/O DYE: CPT

## 2021-01-01 PROCEDURE — 85025 COMPLETE CBC W/AUTO DIFF WBC: CPT

## 2021-01-01 PROCEDURE — 70450 CT HEAD/BRAIN W/O DYE: CPT

## 2021-01-01 PROCEDURE — 36415 COLL VENOUS BLD VENIPUNCTURE: CPT

## 2021-01-01 PROCEDURE — 93005 ELECTROCARDIOGRAM TRACING: CPT

## 2021-01-01 PROCEDURE — 82962 GLUCOSE BLOOD TEST: CPT

## 2021-01-01 PROCEDURE — 96361 HYDRATE IV INFUSION ADD-ON: CPT

## 2021-01-01 PROCEDURE — 99285 EMERGENCY DEPT VISIT HI MDM: CPT

## 2021-01-01 PROCEDURE — 65660000000 HC RM CCU STEPDOWN

## 2021-01-01 PROCEDURE — 94640 AIRWAY INHALATION TREATMENT: CPT

## 2021-01-01 PROCEDURE — 74011250637 HC RX REV CODE- 250/637: Performed by: STUDENT IN AN ORGANIZED HEALTH CARE EDUCATION/TRAINING PROGRAM

## 2021-01-01 PROCEDURE — 74011000250 HC RX REV CODE- 250: Performed by: NURSE PRACTITIONER

## 2021-01-01 PROCEDURE — 2709999900 HC NON-CHARGEABLE SUPPLY

## 2021-01-01 RX ORDER — CLONAZEPAM 1 MG/1
0.5 TABLET ORAL DAILY
Status: DISCONTINUED | OUTPATIENT
Start: 2021-01-02 | End: 2021-01-03

## 2021-01-01 RX ORDER — PREGABALIN 100 MG/1
300 CAPSULE ORAL 2 TIMES DAILY
Status: DISCONTINUED | OUTPATIENT
Start: 2021-01-01 | End: 2021-01-07 | Stop reason: HOSPADM

## 2021-01-01 RX ORDER — ATORVASTATIN CALCIUM 40 MG/1
40 TABLET, FILM COATED ORAL
Status: DISCONTINUED | OUTPATIENT
Start: 2021-01-01 | End: 2021-01-07 | Stop reason: HOSPADM

## 2021-01-01 RX ORDER — DEXTROSE 50 % IN WATER (D50W) INTRAVENOUS SYRINGE
50
Status: COMPLETED | OUTPATIENT
Start: 2021-01-01 | End: 2021-01-01

## 2021-01-01 RX ORDER — TRAMADOL HYDROCHLORIDE 50 MG/1
50 TABLET ORAL
Status: DISCONTINUED | OUTPATIENT
Start: 2021-01-01 | End: 2021-01-07 | Stop reason: HOSPADM

## 2021-01-01 RX ORDER — MAGNESIUM SULFATE 100 %
4 CRYSTALS MISCELLANEOUS AS NEEDED
Status: DISCONTINUED | OUTPATIENT
Start: 2021-01-01 | End: 2021-01-03 | Stop reason: SDUPTHER

## 2021-01-01 RX ORDER — ACETAMINOPHEN 325 MG/1
650 TABLET ORAL
Status: DISCONTINUED | OUTPATIENT
Start: 2021-01-01 | End: 2021-01-02 | Stop reason: SDUPTHER

## 2021-01-01 RX ORDER — BUMETANIDE 1 MG/1
1 TABLET ORAL DAILY
Status: DISCONTINUED | OUTPATIENT
Start: 2021-01-02 | End: 2021-01-07 | Stop reason: HOSPADM

## 2021-01-01 RX ORDER — DEXTROSE MONOHYDRATE 100 MG/ML
25 INJECTION, SOLUTION INTRAVENOUS CONTINUOUS
Status: DISPENSED | OUTPATIENT
Start: 2021-01-01 | End: 2021-01-01

## 2021-01-01 RX ORDER — ONDANSETRON 2 MG/ML
4 INJECTION INTRAMUSCULAR; INTRAVENOUS
Status: DISCONTINUED | OUTPATIENT
Start: 2021-01-01 | End: 2021-01-07 | Stop reason: HOSPADM

## 2021-01-01 RX ORDER — CALCIUM CARBONATE 500(1250)
500 TABLET ORAL DAILY
Status: DISCONTINUED | OUTPATIENT
Start: 2021-01-02 | End: 2021-01-07 | Stop reason: HOSPADM

## 2021-01-01 RX ORDER — ISOSORBIDE MONONITRATE 30 MG/1
60 TABLET, EXTENDED RELEASE ORAL DAILY
Status: DISCONTINUED | OUTPATIENT
Start: 2021-01-02 | End: 2021-01-07 | Stop reason: HOSPADM

## 2021-01-01 RX ORDER — CYCLOBENZAPRINE HCL 10 MG
10 TABLET ORAL
Status: DISCONTINUED | OUTPATIENT
Start: 2021-01-01 | End: 2021-01-07 | Stop reason: HOSPADM

## 2021-01-01 RX ORDER — PROMETHAZINE HYDROCHLORIDE 25 MG/1
12.5 TABLET ORAL
Status: DISCONTINUED | OUTPATIENT
Start: 2021-01-01 | End: 2021-01-07 | Stop reason: HOSPADM

## 2021-01-01 RX ORDER — SODIUM CHLORIDE 0.9 % (FLUSH) 0.9 %
5-40 SYRINGE (ML) INJECTION EVERY 8 HOURS
Status: DISCONTINUED | OUTPATIENT
Start: 2021-01-01 | End: 2021-01-07 | Stop reason: HOSPADM

## 2021-01-01 RX ORDER — DEXTROSE MONOHYDRATE 100 MG/ML
250 INJECTION, SOLUTION INTRAVENOUS CONTINUOUS
Status: DISCONTINUED | OUTPATIENT
Start: 2021-01-01 | End: 2021-01-01

## 2021-01-01 RX ORDER — IPRATROPIUM BROMIDE AND ALBUTEROL SULFATE 2.5; .5 MG/3ML; MG/3ML
3 SOLUTION RESPIRATORY (INHALATION)
Status: DISCONTINUED | OUTPATIENT
Start: 2021-01-02 | End: 2021-01-07

## 2021-01-01 RX ORDER — DEXTROSE 50 % IN WATER (D50W) INTRAVENOUS SYRINGE
25 ONCE
Status: COMPLETED | OUTPATIENT
Start: 2021-01-01 | End: 2021-01-01

## 2021-01-01 RX ORDER — DABIGATRAN ETEXILATE 150 MG/1
150 CAPSULE ORAL 2 TIMES DAILY
Status: DISCONTINUED | OUTPATIENT
Start: 2021-01-01 | End: 2021-01-07 | Stop reason: HOSPADM

## 2021-01-01 RX ORDER — DEXTROSE 50 % IN WATER (D50W) INTRAVENOUS SYRINGE
12.5-25 AS NEEDED
Status: DISCONTINUED | OUTPATIENT
Start: 2021-01-01 | End: 2021-01-03 | Stop reason: SDUPTHER

## 2021-01-01 RX ORDER — IPRATROPIUM BROMIDE AND ALBUTEROL SULFATE 2.5; .5 MG/3ML; MG/3ML
3 SOLUTION RESPIRATORY (INHALATION)
Status: DISCONTINUED | OUTPATIENT
Start: 2021-01-01 | End: 2021-01-07 | Stop reason: HOSPADM

## 2021-01-01 RX ORDER — IPRATROPIUM BROMIDE AND ALBUTEROL SULFATE 2.5; .5 MG/3ML; MG/3ML
3 SOLUTION RESPIRATORY (INHALATION)
Status: ACTIVE | OUTPATIENT
Start: 2021-01-01 | End: 2021-01-02

## 2021-01-01 RX ORDER — POLYETHYLENE GLYCOL 3350 17 G/17G
17 POWDER, FOR SOLUTION ORAL DAILY PRN
Status: DISCONTINUED | OUTPATIENT
Start: 2021-01-01 | End: 2021-01-07 | Stop reason: HOSPADM

## 2021-01-01 RX ORDER — PANTOPRAZOLE SODIUM 40 MG/1
40 TABLET, DELAYED RELEASE ORAL
Status: DISCONTINUED | OUTPATIENT
Start: 2021-01-02 | End: 2021-01-07 | Stop reason: HOSPADM

## 2021-01-01 RX ORDER — LISINOPRIL 20 MG/1
20 TABLET ORAL DAILY
Status: DISCONTINUED | OUTPATIENT
Start: 2021-01-02 | End: 2021-01-07 | Stop reason: HOSPADM

## 2021-01-01 RX ORDER — BUMETANIDE 0.25 MG/ML
1 INJECTION INTRAMUSCULAR; INTRAVENOUS ONCE
Status: COMPLETED | OUTPATIENT
Start: 2021-01-01 | End: 2021-01-01

## 2021-01-01 RX ORDER — ACETAMINOPHEN 325 MG/1
650 TABLET ORAL
Status: DISCONTINUED | OUTPATIENT
Start: 2021-01-01 | End: 2021-01-07 | Stop reason: HOSPADM

## 2021-01-01 RX ORDER — GUAIFENESIN 100 MG/5ML
81 LIQUID (ML) ORAL DAILY
Status: DISCONTINUED | OUTPATIENT
Start: 2021-01-02 | End: 2021-01-07 | Stop reason: HOSPADM

## 2021-01-01 RX ORDER — VENLAFAXINE HYDROCHLORIDE 150 MG/1
150 CAPSULE, EXTENDED RELEASE ORAL 2 TIMES DAILY WITH MEALS
Status: DISCONTINUED | OUTPATIENT
Start: 2021-01-01 | End: 2021-01-07 | Stop reason: HOSPADM

## 2021-01-01 RX ORDER — SODIUM CHLORIDE 0.9 % (FLUSH) 0.9 %
5-40 SYRINGE (ML) INJECTION AS NEEDED
Status: DISCONTINUED | OUTPATIENT
Start: 2021-01-01 | End: 2021-01-07 | Stop reason: HOSPADM

## 2021-01-01 RX ORDER — METOPROLOL SUCCINATE 50 MG/1
100 TABLET, EXTENDED RELEASE ORAL DAILY
Status: DISCONTINUED | OUTPATIENT
Start: 2021-01-02 | End: 2021-01-07 | Stop reason: HOSPADM

## 2021-01-01 RX ORDER — HYDRALAZINE HYDROCHLORIDE 50 MG/1
50 TABLET, FILM COATED ORAL 2 TIMES DAILY
Status: DISCONTINUED | OUTPATIENT
Start: 2021-01-01 | End: 2021-01-07 | Stop reason: HOSPADM

## 2021-01-01 RX ORDER — IPRATROPIUM BROMIDE AND ALBUTEROL SULFATE 2.5; .5 MG/3ML; MG/3ML
3 SOLUTION RESPIRATORY (INHALATION)
Status: DISCONTINUED | OUTPATIENT
Start: 2021-01-01 | End: 2021-01-01

## 2021-01-01 RX ORDER — ACETAMINOPHEN 650 MG/1
650 SUPPOSITORY RECTAL
Status: DISCONTINUED | OUTPATIENT
Start: 2021-01-01 | End: 2021-01-07 | Stop reason: HOSPADM

## 2021-01-01 RX ADMIN — TRAMADOL HYDROCHLORIDE 50 MG: 50 TABLET, COATED ORAL at 20:07

## 2021-01-01 RX ADMIN — Medication 10 ML: at 21:47

## 2021-01-01 RX ADMIN — ARFORMOTEROL TARTRATE: 15 SOLUTION RESPIRATORY (INHALATION) at 20:52

## 2021-01-01 RX ADMIN — DEXTROSE MONOHYDRATE 500 ML/HR: 10 INJECTION, SOLUTION INTRAVENOUS at 13:05

## 2021-01-01 RX ADMIN — ATORVASTATIN CALCIUM 40 MG: 40 TABLET, FILM COATED ORAL at 21:47

## 2021-01-01 RX ADMIN — DABIGATRAN ETEXILATE MESYLATE 150 MG: 150 CAPSULE ORAL at 18:03

## 2021-01-01 RX ADMIN — HYDRALAZINE HYDROCHLORIDE 50 MG: 50 TABLET, FILM COATED ORAL at 17:59

## 2021-01-01 RX ADMIN — IPRATROPIUM BROMIDE AND ALBUTEROL SULFATE 3 ML: .5; 3 SOLUTION RESPIRATORY (INHALATION) at 20:48

## 2021-01-01 RX ADMIN — DEXTROSE MONOHYDRATE 25 G: 25 INJECTION, SOLUTION INTRAVENOUS at 21:14

## 2021-01-01 RX ADMIN — SODIUM CHLORIDE 1000 ML: 9 INJECTION, SOLUTION INTRAVENOUS at 10:14

## 2021-01-01 RX ADMIN — IPRATROPIUM BROMIDE AND ALBUTEROL SULFATE 3 ML: .5; 3 SOLUTION RESPIRATORY (INHALATION) at 15:04

## 2021-01-01 RX ADMIN — DEXTROSE 250 ML: 10 SOLUTION INTRAVENOUS at 11:36

## 2021-01-01 RX ADMIN — DEXTROSE MONOHYDRATE 12.5 G: 25 INJECTION, SOLUTION INTRAVENOUS at 16:40

## 2021-01-01 RX ADMIN — CYCLOBENZAPRINE 10 MG: 10 TABLET, FILM COATED ORAL at 20:07

## 2021-01-01 RX ADMIN — Medication 10 ML: at 14:00

## 2021-01-01 RX ADMIN — DEXTROSE MONOHYDRATE 25 G: 25 INJECTION, SOLUTION INTRAVENOUS at 10:12

## 2021-01-01 RX ADMIN — BUMETANIDE 1 MG: 0.25 INJECTION, SOLUTION INTRAMUSCULAR; INTRAVENOUS at 16:41

## 2021-01-01 RX ADMIN — PREGABALIN 300 MG: 100 CAPSULE ORAL at 18:00

## 2021-01-01 NOTE — H&P
Hospitalist Admission Note    NAME: Kevin Auguste   :  1950   MRN:  [id number]     Date/Time:  2021 12:58 PM    Patient PCP: Pablo Rodriguez MD  ______________________________________________________________________  Given the patient's current clinical presentation, I have a high level of concern for decompensation if discharged from the emergency department. Complex decision making was performed, which includes reviewing the patient's available past medical records, laboratory results, and x-ray films. My assessment of this patient's clinical condition and my plan of care is as follows. Assessment / Plan: Altered mental status likely d/t hypoglycemia:  DM on Glimeperide  CT head negative  Will r/o infection, no obvious focus currently, f/u CXR, UA, will do CT lumbar scan as she is complaining of worsening back pain, had a recent kyphoplasty  C/w D10 for now, monitor FS q1 hour for now. Her mental status has improved compared to this morning, likely is related to her hypoglycemia. No suspicion for glimeperide overdose, will hold this for now, may need to be changed on discharge    HTN:  C/w home meds    CKD3:   Creatinine stable will monitor    CHF:  Pulmonary HTN  CAD s/p PCI  S/p Biv ICD  Chronic Afib  C/w home meds bumex, pradaxa    Spinal stenosis  L1 fracture recent Kyphoplasty  Will repeat CT lumbar spine    Code Status: Full code  Surrogate Decision Maker:     DVT Prophylaxis: On Dabigatran  GI Prophylaxis: protonix    Baseline: Walks with walker      Subjective:   CHIEF COMPLAINT: lethargy    HISTORY OF PRESENT ILLNESS:     Fiona Rose is a 78 y.o. female with past medical history of CHF, SHAYLA on CPAP, spinal stenosis status, L1 fracture recent kyphoplasty, diabetes, hypertension was brought in by EMS for lethargy. As per the  she was less responsive than usual yesterday evening, for which they called EMS however she refused the transport. This morning her  has difficulty waking her up and so called the EMS. On route to emergency her blood sugar was 38 and was given dextrose, she received further doses of dextrose in ED because she was still having low blood sugar. Currently she is awake, and AO x2. She was recently discharged from this hospital few days back after being treated for chronic low back pain and had a kyphoplasty, and was also treated for left lower extremity cellulitis. Currently she complains of having worsening lower back pain. She denies any fever but has minimal dry cough, she denies any chest pain, belly pain, change in bowel habits. She mentions having occasional urinary incontinence. She denies any focal deficits. We were asked to admit for work up and evaluation of the above problems.      Past Medical History:   Diagnosis Date    Anxiety 1/22/2018    Arthritis     OA    Asthma     Diabetes ([address]     Essential hypertension     GERD (gastroesophageal reflux disease)     Hypercholesterolemia 1/22/2018    Hypertension     Ill-defined condition     diverticulitis    Long-term use of high-risk medication 1/22/2018    Neuropathy     Obesity (BMI 30-39.9) 4/30/2019    Other ill-defined conditions(799.89)     IBS, spinal stenosis    Plantar fasciitis     Psychiatric disorder     depression, anxiety    Sleep apnea     uses CPAP    Type 2 diabetes mellitus with diabetic neuropathy, without long-term current use of insulin ([address] 6/5/2016        Past Surgical History:   Procedure Laterality Date    COLONOSCOPY N/A 3/14/2018    COLONOSCOPY performed by Arturo Hummel MD at hospitals ENDOSCOPY    HX APPENDECTOMY      HX CHOLECYSTECTOMY  11/15/2018    HX HYSTERECTOMY      HX PACEMAKER      IR KYPHOPLASTY LUMBAR  12/22/2020    SD CARDIAC SURG PROCEDURE UNLIST      stent       Social History     Tobacco Use    Smoking status: Never Smoker    Smokeless tobacco: Never Used   Substance Use Topics    Alcohol use: No        Family History   Problem Relation Age of Onset   Mariama Sauer Arthritis-osteo Mother     Hypertension Mother     High Cholesterol Mother     Crohn's Disease Mother     Heart Disease Mother     Alcohol abuse Father     High Cholesterol Sister     Hypertension Sister     Thyroid Disease Sister     COPD Sister     High Cholesterol Brother     Hypertension Brother     COPD Brother     COPD Child     Inflammatory Bowel Dz Child      Allergies   Allergen Reactions    Sulfa (Sulfonamide Antibiotics) Swelling    Amoxicillin Swelling        Prior to Admission medications    Medication Sig Start Date End Date Taking? Authorizing Provider   hydrocortisone (ANUSOL-HC) 2.5 % rectal cream Insert  into rectum two (2) times a day. 12/29/20   Jenn Cain MD   bumetanide (BUMEX) 1 mg tablet Take 1 Tab by mouth daily. 12/25/20   Ramonia Shital, NP   calcium carbonate (OS-YEFRI) 500 mg calcium (1,250 mg) tablet Take 1 Tab by mouth daily. 12/25/20   Ramonia Shital, NP   hydrALAZINE (APRESOLINE) 50 mg tablet Take 1 Tab by mouth two (2) times a day. 12/24/20   Ramonia Shital, NP   levoFLOXacin (LEVAQUIN) 750 mg tablet Take 1 Tab by mouth every fourty-eight (48) hours for 10 days. 12/26/20 1/5/21  Ramonia Shital, NP   doxycycline (VIBRA-TABS) 100 mg tablet Take 1 Tab by mouth every twelve (12) hours for 10 days. 12/24/20 1/3/21  Ramonia [place], NP   calcium phosphate-vitamin D3 (Caltrate Gummy Bites) 250 mg-10 mcg (400 unit) chew Take 2 Each by mouth daily. caltrate bone health gummies    Provider, Historical   oxymetazoline 0.05 % mist 2 Sprays by Nasal route every twelve (12) hours as needed (nose bleed). Provider, Historical   eucalyptus-peppermint oil (Ponaris) soln 1-2 Drops by Nasal route nightly as needed. Provider, Historical   pregabalin (Lyrica) 300 mg capsule Take 300 mg by mouth two (2) times a day.     Provider, Historical   clonazePAM (KlonoPIN) 0.5 mg tablet TAKE 1 TABLET BY MOUTH NIGHTLY . DO NOT EXCEED  0.5MG  PER  DAY 11/23/20   Chidi Walls MD   triamcinolone acetonide (KENALOG) 0.1 % topical cream APPLY A THIN FILM OF CREAM EXTERNALLY TO AFFECTED AREA TWICE DAILY 10/8/20   Lucy Walls MD   clotrimazole-betamethasone (LOTRISONE) topical cream APPLY TO THE AFFECTED AREA 2 TIMES DAILY 10/8/20   Onelia Adhikari MD   isosorbide mononitrate ER (IMDUR) 60 mg CR tablet Take 1 Tab by mouth daily. 10/7/20   Onelia Adhikari MD   cyclobenzaprine (FLEXERIL) 10 mg tablet Take 1 tablet by mouth three times daily as needed for muscle spasm 10/7/20   Onelia Adhikari MD   glimepiride (AMARYL) 2 mg tablet Take 1 tablet by mouth twice daily 10/7/20   Onelia Adhikari MD   magnesium oxide (MAG-OX) 400 mg tablet Take 1 tablet by mouth twice daily 9/29/20   Onelia Adhikari MD   loratadine (Claritin) 10 mg tablet Take 10 mg by mouth two (2) times a day. Indications: Patient states she is using Claritin instead of Benadryl, is not taking Benadryl per patient. Provider, Historical   lisinopriL (PRINIVIL, ZESTRIL) 40 mg tablet Take 0.5 Tabs by mouth daily. 8/12/20   Angeline Holder MD   mupirocin calcium (BACTROBAN) 2 % topical cream Apply  to affected area two (2) times a day. 6/29/20   Onelia Adhikari MD   omeprazole (PRILOSEC) 40 mg capsule TAKE 1 CAPSULE EVERY DAY 5/31/20   Onelia Adhikari MD   potassium chloride SR (KLOR-CON 10) 10 mEq tablet Take 1 Tab by mouth three (3) times daily. 5/11/20   Onelia Adhikari MD   SYMBICORT 160-4.5 mcg/actuation HFAA INHALE 2 PUFFS BY MOUTH TWICE DAILY 10/1/19   Lucy Walls MD   albuterol (PROVENTIL HFA, VENTOLIN HFA, PROAIR HFA) 90 mcg/actuation inhaler Take 1 Puff by inhalation every four (4) hours as needed for Wheezing. Patient taking differently: Take 1 Puff by inhalation two (2) times daily as needed for Wheezing.  8/17/19   José Manuel Fernandez MD   diphenhydrAMINE (BENADRYL) 25 mg capsule Take 25 mg by mouth two (2) times a day. Provider, Historical   nystatin (MYCOSTATIN) topical cream Apply  to affected area two (2) times daily as needed for Skin Irritation (Rash). Provider, Historical   traMADol (ULTRAM) 50 mg tablet Take 50 mg by mouth daily as needed for Pain (Used to supplement the Lyrica when lyrica doesnt manage the pain on its own). Provider, Historical   dabigatran etexilate (PRADAXA) 150 mg capsule Take 1 Cap by mouth two (2) times a day. IF NO BLEEDING AT CATH SITE. 2/24/19   Nancy Robertson MD   metoprolol succinate (TOPROL-XL) 100 mg tablet Take 1 Tab by mouth daily. 11/19/18   Sher Simons MD   acetaminophen (TYLENOL EXTRA STRENGTH) 500 mg tablet Take 1,000 mg by mouth every eight (8) hours as needed for Pain (Headache). Provider, Historical   lidocaine (ASPERCREME, LIDOCAINE,) 4 % topical cream Apply  to affected area daily as needed for Pain (Knee pain). Provider, Historical   sodium chloride (AYR SALINE) 0.65 % nasal squeeze bottle 2 Sprays by Both Nostrils route every eight (8) hours as needed. Provider, Historical   conjugated estrogens (PREMARIN) 0.625 mg/gram vaginal cream Insert 0.5 g into vagina two (2) times a week. Tuesdays and Thursdays     Provider, Historical   venlafaxine-SR (EFFEXOR XR) 150 mg capsule Take 150 mg by mouth two (2) times daily (with meals). Provider, Historical   cholecalciferol, vitamin d3, (VITAMIN D3) 400 unit cap Take 400 Units by mouth daily. Other, MD Ernie   aspirin 81 mg chewable tablet Take 81 mg by mouth daily. Provider, Historical   atorvastatin (LIPITOR) 40 mg tablet Take 1 Tab by mouth nightly.  4/9/14   Nancy Robertson MD       REVIEW OF SYSTEMS:         Total of 12 systems reviewed as follows:       POSITIVE= underlined text  Negative = text not underlined  General:  fever, chills, sweats, generalized weakness, weight loss/gain,      loss of appetite   Eyes:    blurred vision, eye pain, loss of vision, double vision  ENT:    rhinorrhea, pharyngitis   Respiratory:   Dry cough, sputum production, SOB, STEEN, wheezing, pleuritic pain   Cardiology:   chest pain, palpitations, orthopnea, PND, edema, syncope   Gastrointestinal:  abdominal pain , N/V, diarrhea, dysphagia, constipation, bleeding   Genitourinary:  frequency, urgency, dysuria, hematuria, incontinence   Muskuloskeletal :  arthralgia, myalgia, back pain  Hematology:  easy bruising, nose or gum bleeding, lymphadenopathy   Dermatological: rash, ulceration, pruritis, color change / jaundice  Endocrine:   hot flashes or polydipsia   Neurological:  headache, dizziness, confusion, focal weakness, paresthesia,     Speech difficulties, memory loss, gait difficulty  Psychological: Feelings of anxiety, depression, agitation    Objective:   VITALS:    Visit Vitals  BP (!) 135/99 (BP 1 Location: Right arm, BP Patient Position: At rest)   Pulse 83   Temp 97.9 °F (36.6 °C)   SpO2 94%       PHYSICAL EXAM:    General:    Alert, cooperative, no distress, appears stated age. HEENT: Atraumatic, anicteric sclerae, pink conjunctivae     No oral ulcers, mucosa moist, throat clear, dentition fair  Neck:  Supple, symmetrical,  thyroid: non tender  Lungs:   Clear to auscultation bilaterally. No Wheezing or Rhonchi. No rales. Chest wall:  No tenderness  No Accessory muscle use. Heart:   Regular  rhythm,  No  murmur   No edema  Abdomen:   Soft, non-tender. Not distended. Bowel sounds normal  Extremities: No cyanosis. No clubbing,      Skin turgor normal, Capillary refill normal, Radial dial pulse 2+  Skin:     Not pale. Not Jaundiced  No rashes   Psych:  Good insight. Not depressed. Not anxious or agitated.   Neurologic: AO x 2, non focal  _______________________________________________________________________  Care Plan discussed with:    Comments   Patient y    Family  y    RN y    Care Manager                    Consultant: _______________________________________________________________________  Expected  Disposition:   Home with Family y   HH/PT/OT/RN    SNF/LTC    LISA    ________________________________________________________________________  TOTAL TIME:  28 Minutes    Critical Care Provided     Minutes non procedure based      Comments     Reviewed previous records   >50% of visit spent in counseling and coordination of care  Discussion with patient and/or family and questions answered       ________________________________________________________________________  Signed: Fela Alegre MD    Procedures: see electronic medical records for all procedures/Xrays and details which were not copied into this note but were reviewed prior to creation of Plan. LAB DATA REVIEWED:    Recent Results (from the past 24 hour(s))   GLUCOSE, POC    Collection Time: 01/01/21 10:05 AM   Result Value Ref Range    Glucose (POC) 45 (LL) 65 - 100 mg/dL    Performed by [address] [place] [address], POC    Collection Time: 01/01/21 10:06 AM   Result Value Ref Range    Glucose (POC) 43 (LL) 65 - 100 mg/dL    Performed by [address]  DIFF    Collection Time: 01/01/21 10:18 AM   Result Value Ref Range    WBC 7.4 3.6 - 11.0 K/uL    RBC 3.83 3.80 - 5.20 M/uL    HGB 9.0 (L) 11.5 - 16.0 g/dL    HCT 31.7 (L) 35.0 - 47.0 %    MCV 82.8 80.0 - 99.0 FL    MCH 23.5 (L) 26.0 - 34.0 PG    MCHC 28.4 (L) 30.0 - 36.5 g/dL    RDW 18.6 (H) 11.5 - 14.5 %    PLATELET 944 798 - 080 K/uL    MPV 11.3 8.9 - 12.9 FL    NRBC 0.0 0  WBC    ABSOLUTE NRBC 0.00 0.00 - 0.01 K/uL    NEUTROPHILS 82 (H) 32 - 75 %    LYMPHOCYTES 11 (L) 12 - 49 %    MONOCYTES 5 5 - 13 %    EOSINOPHILS 1 0 - 7 %    BASOPHILS 0 0 - 1 %    IMMATURE GRANULOCYTES 1 (H) 0.0 - 0.5 %    ABS. NEUTROPHILS 6.0 1.8 - 8.0 K/UL    ABS. LYMPHOCYTES 0.8 0.8 - 3.5 K/UL    ABS. MONOCYTES 0.4 0.0 - 1.0 K/UL    ABS. EOSINOPHILS 0.1 0.0 - 0.4 K/UL    ABS. BASOPHILS 0.0 0.0 - 0.1 K/UL    ABS. IMM. GRANS. 0.1 (H) 0.00 - 0.04 K/UL    DF SMEAR SCANNED      RBC COMMENTS ANISOCYTOSIS  1+        RBC COMMENTS HYPOCHROMIA  1+       METABOLIC PANEL, COMPREHENSIVE    Collection Time: 01/01/21 10:18 AM   Result Value Ref Range    Sodium 141 136 - 145 mmol/L    Potassium 3.7 3.5 - 5.1 mmol/L    Chloride 112 (H) 97 - 108 mmol/L    CO2 26 21 - 32 mmol/L    Anion gap 3 (L) 5 - 15 mmol/L    Glucose 248 (H) 65 - 100 mg/dL    BUN 39 (H) 6 - 20 MG/DL    Creatinine 1.58 (H) 0.55 - 1.02 MG/DL    BUN/Creatinine ratio 25 (H) 12 - 20      GFR est AA 39 (L) >60 ml/min/1.73m2    GFR est non-AA 33 (L) >60 ml/min/1.73m2    Calcium 8.4 (L) 8.5 - 10.1 MG/DL    Bilirubin, total 0.4 0.2 - 1.0 MG/DL    ALT (SGPT) 18 12 - 78 U/L    AST (SGOT) 19 15 - 37 U/L    Alk.  phosphatase 176 (H) 45 - 117 U/L    Protein, total 5.6 (L) 6.4 - 8.2 g/dL    Albumin 2.4 (L) 3.5 - 5.0 g/dL    Globulin 3.2 2.0 - 4.0 g/dL    A-G Ratio 0.8 (L) 1.1 - 2.2     TROPONIN I    Collection Time: 01/01/21 10:18 AM   Result Value Ref Range    Troponin-I, Qt. <0.05 <0.05 ng/mL   LIPASE    Collection Time: 01/01/21 10:18 AM   Result Value Ref Range    Lipase 46 (L) 73 - 393 U/L   MAGNESIUM    Collection Time: 01/01/21 10:18 AM   Result Value Ref Range    Magnesium 1.8 1.6 - 2.4 mg/dL   TSH 3RD GENERATION    Collection Time: 01/01/21 10:18 AM   Result Value Ref Range    TSH 0.60 0.36 - 3.74 uIU/mL   GLUCOSE, POC    Collection Time: 01/01/21 10:59 AM   Result Value Ref Range    Glucose (POC) 68 65 - 100 mg/dL    Performed by [address] [place] [address], POC    Collection Time: 01/01/21 11:54 AM   Result Value Ref Range    Glucose (POC) 56 (L) 65 - 100 mg/dL    Performed by Calvin Galvan RN ([facility])    GLUCOSE, POC    Collection Time: 01/01/21 12:28 PM   Result Value Ref Range    Glucose (POC) 66 65 - 100 mg/dL    Performed by Calvin Galvan RN ([facility])

## 2021-01-01 NOTE — PROGRESS NOTES
1618 BS 61 MD made aware, orders placed. 25cc push Dextrose given and D10 increased to 100 mL/hr     1640 dextrose push given with apple juice and crackers.

## 2021-01-01 NOTE — ED NOTES
TRANSFER - OUT REPORT:    Verbal report given to Jessica(name) on Joseph King  being transferred to PCU(unit) for routine progression of care       Report consisted of patients Situation, Background, Assessment and   Recommendations(SBAR). Information from the following report(s) SBAR, ED Summary and MAR was reviewed with the receiving nurse. Lines:   Peripheral IV 01/01/21 Right Antecubital (Active)        Opportunity for questions and clarification was provided.       Patient transported with:   Monitor  O2 @ 4 liters  Registered Nurse

## 2021-01-01 NOTE — ED PROVIDER NOTES
EMERGENCY DEPARTMENT HISTORY AND PHYSICAL EXAM          Date: 1/1/2021  Patient Name: Lakia Strauss    History of Presenting Illness     Chief Complaint   Patient presents with    Low Blood Sugar     Pt BS in Ed 45    Altered mental status     Pt would not respond this morning to family       History Provided By: EMS    HPI: Lakia Strauss is a 78 y.o. female, pmhx hypertension, diabetes, asthma, depression and anxiety with chronic pain, who presents via EMS to the ED c/o altered mental status    Patient unable to provide history. According to the paramedics 911 was was contacted last night by the family as patient was lethargic. At that time she was awake and alert enough to refuse transport. This morning family checked on her at 67 she was able to open her eyes but she was not talking so they let her go back to sleep. At 830 they had trouble waking her up so again EMS was contacted. On their arrival they found her blood sugar to be 34 so they gave her D10 and then her blood sugar improved to 77. Her other vital signs were all normal on their arrival and in route and all she kept telling them was to leave her alone. Currently patient is just repeating over and over again \"leave me alone\". PCP: Jose Patrick MD    Allergies: Sulfa and amoxicillin  Social Hx: Lives with family    There are no other complaints, changes, or physical findings at this time.      Current Facility-Administered Medications   Medication Dose Route Frequency Provider Last Rate Last Admin    dextrose 10% infusion  100 mL/hr IntraVENous CONTINUOUS Sheila Baez  mL/hr at 01/01/21 1305 500 mL/hr at 01/01/21 1305    acetaminophen (TYLENOL) tablet 650 mg  650 mg Oral Q8H PRN Sheila Baez MD        albuterol-ipratropium (DUO-NEB) 2.5 MG-0.5 MG/3 ML  3 mL Nebulization Q6H PRN Sheila Baez MD       [facility] [START ON 1/2/2021] aspirin chewable tablet 81 mg  81 mg Oral DAILY Sheila Baez MD       [facility] atorvastatin (LIPITOR) tablet 40 mg  40 mg Oral QHS uSe Spaulding MD        [START ON 1/2/2021] bumetanide (BUMEX) tablet 1 mg  1 mg Oral DAILY Sue Spaulding MD       [address] [place] [address] [START ON 1/2/2021] calcium carbonate (OS-YEFRI) tablet 500 mg [elemental]  500 mg Oral DAILY Sue Spaulding MD       [address] [place] [address] ON 1/2/2021] clonazePAM (KlonoPIN) tablet 0.5 mg  0.5 mg Oral DAILY Sue Spaulding MD        cyclobenzaprine (FLEXERIL) tablet 10 mg  10 mg Oral TID PRN Sue Spaulding MD        dabigatran etexilate (PRADAXA) capsule 150 mg  150 mg Oral BID Sue Spaulding MD        hydrALAZINE (APRESOLINE) tablet 50 mg  50 mg Oral BID Sue Spaulding MD       [address] [place] [address] ON 1/2/2021] isosorbide mononitrate ER (IMDUR) tablet 60 mg  60 mg Oral DAILY Sue Spaulding MD       [address] [place] [address] [START ON 1/2/2021] lisinopriL (PRINIVIL, ZESTRIL) tablet 20 mg  20 mg Oral DAILY Sue Spaulding MD       [address] [place] [address] ON 1/2/2021] metoprolol succinate (TOPROL-XL) XL tablet 100 mg  100 mg Oral DAILY Sue Spaulding MD       [address] [place] [address] ON 1/2/2021] pantoprazole (PROTONIX) tablet 40 mg  40 mg Oral ACB Sue Spaulding MD        pregabalin (LYRICA) capsule 300 mg  300 mg Oral BID Sue Spaulding MD        arformoterol 15 mcg/budesonide 0.5 mg neb solution   Nebulization BID Sue Spaulding MD        traMADoL (ULTRAM) tablet 50 mg  50 mg Oral Q6H PRN Sue Spaulding MD        venlafaxine-SR (EFFEXOR-XR) capsule 150 mg  150 mg Oral BID WITH MEALS Sue Spaulding MD        sodium chloride (NS) flush 5-40 mL  5-40 mL IntraVENous Q8H Sue Spaulding MD        sodium chloride (NS) flush 5-40 mL  5-40 mL IntraVENous PRN uSe Spaulding MD        acetaminophen (TYLENOL) tablet 650 mg  650 mg Oral Q6H PRN Sue Spaulding MD        Or    acetaminophen (TYLENOL) suppository 650 mg  650 mg Rectal Q6H PRN Sue Spaulding MD        polyethylene glycol (MIRALAX) packet 17 g  17 g Oral DAILY PRN Sue Spaulding MD  promethazine (PHENERGAN) tablet 12.5 mg  12.5 mg Oral Q6H PRN Kingston Kim MD        Or    ondansetron (ZOFRAN) injection 4 mg  4 mg IntraVENous Q6H PRN Kingston Kim MD        albuterol-ipratropium (DUO-NEB) 2.5 MG-0.5 MG/3 ML  3 mL Nebulization NOW Radha Murray MD         Current Outpatient Medications   Medication Sig Dispense Refill    hydrocortisone (ANUSOL-HC) 2.5 % rectal cream Insert  into rectum two (2) times a day. 30 g 0    bumetanide (BUMEX) 1 mg tablet Take 1 Tab by mouth daily. 30 Tab 0    calcium carbonate (OS-YEFRI) 500 mg calcium (1,250 mg) tablet Take 1 Tab by mouth daily. 30 Tab 0    hydrALAZINE (APRESOLINE) 50 mg tablet Take 1 Tab by mouth two (2) times a day. 30 Tab 0    levoFLOXacin (LEVAQUIN) 750 mg tablet Take 1 Tab by mouth every fourty-eight (48) hours for 10 days. 5 Tab 0    doxycycline (VIBRA-TABS) 100 mg tablet Take 1 Tab by mouth every twelve (12) hours for 10 days. 20 Tab 0    calcium phosphate-vitamin D3 (Caltrate Gummy Bites) 250 mg-10 mcg (400 unit) chew Take 2 Each by mouth daily. caltrate bone health gummies      oxymetazoline 0.05 % mist 2 Sprays by Nasal route every twelve (12) hours as needed (nose bleed).  eucalyptus-peppermint oil (Ponaris) soln 1-2 Drops by Nasal route nightly as needed.  pregabalin (Lyrica) 300 mg capsule Take 300 mg by mouth two (2) times a day.  clonazePAM (KlonoPIN) 0.5 mg tablet TAKE 1 TABLET BY MOUTH NIGHTLY . DO NOT EXCEED  0.5MG  PER  DAY 90 Tab 0    triamcinolone acetonide (KENALOG) 0.1 % topical cream APPLY A THIN FILM OF CREAM EXTERNALLY TO AFFECTED AREA TWICE DAILY 15 g 3    clotrimazole-betamethasone (LOTRISONE) topical cream APPLY TO THE AFFECTED AREA 2 TIMES DAILY 15 g 3    isosorbide mononitrate ER (IMDUR) 60 mg CR tablet Take 1 Tab by mouth daily.  90 Tab 3    cyclobenzaprine (FLEXERIL) 10 mg tablet Take 1 tablet by mouth three times daily as needed for muscle spasm 90 Tab 0    glimepiride (AMARYL) 2 mg tablet Take 1 tablet by mouth twice daily 180 Tab 3    magnesium oxide (MAG-OX) 400 mg tablet Take 1 tablet by mouth twice daily 180 Tab 3    loratadine (Claritin) 10 mg tablet Take 10 mg by mouth two (2) times a day. Indications: Patient states she is using Claritin instead of Benadryl, is not taking Benadryl per patient.  lisinopriL (PRINIVIL, ZESTRIL) 40 mg tablet Take 0.5 Tabs by mouth daily. 30 Tab 1    mupirocin calcium (BACTROBAN) 2 % topical cream Apply  to affected area two (2) times a day. 15 g 0    omeprazole (PRILOSEC) 40 mg capsule TAKE 1 CAPSULE EVERY DAY 90 Cap 3    potassium chloride SR (KLOR-CON 10) 10 mEq tablet Take 1 Tab by mouth three (3) times daily. 540 Tab 3    SYMBICORT 160-4.5 mcg/actuation HFAA INHALE 2 PUFFS BY MOUTH TWICE DAILY 1 Inhaler 3    albuterol (PROVENTIL HFA, VENTOLIN HFA, PROAIR HFA) 90 mcg/actuation inhaler Take 1 Puff by inhalation every four (4) hours as needed for Wheezing. (Patient taking differently: Take 1 Puff by inhalation two (2) times daily as needed for Wheezing.) 1 Inhaler 6    diphenhydrAMINE (BENADRYL) 25 mg capsule Take 25 mg by mouth two (2) times a day.  nystatin (MYCOSTATIN) topical cream Apply  to affected area two (2) times daily as needed for Skin Irritation (Rash).  traMADol (ULTRAM) 50 mg tablet Take 50 mg by mouth daily as needed for Pain (Used to supplement the Lyrica when lyrica doesnt manage the pain on its own).  dabigatran etexilate (PRADAXA) 150 mg capsule Take 1 Cap by mouth two (2) times a day. IF NO BLEEDING AT CATH SITE. 60 Cap 12    metoprolol succinate (TOPROL-XL) 100 mg tablet Take 1 Tab by mouth daily. 30 Tab prn    acetaminophen (TYLENOL EXTRA STRENGTH) 500 mg tablet Take 1,000 mg by mouth every eight (8) hours as needed for Pain (Headache).  lidocaine (ASPERCREME, LIDOCAINE,) 4 % topical cream Apply  to affected area daily as needed for Pain (Knee pain).       sodium chloride (AYR SALINE) 0.65 % nasal squeeze bottle 2 Sprays by Both Nostrils route every eight (8) hours as needed.  conjugated estrogens (PREMARIN) 0.625 mg/gram vaginal cream Insert 0.5 g into vagina two (2) times a week. Tuesdays and Thursdays       venlafaxine-SR (EFFEXOR XR) 150 mg capsule Take 150 mg by mouth two (2) times daily (with meals).  cholecalciferol, vitamin d3, (VITAMIN D3) 400 unit cap Take 400 Units by mouth daily.  aspirin 81 mg chewable tablet Take 81 mg by mouth daily.  atorvastatin (LIPITOR) 40 mg tablet Take 1 Tab by mouth nightly.  27 Tab 12       Past History     Past Medical History:  Past Medical History:   Diagnosis Date    Anxiety 1/22/2018    Arthritis     OA    Asthma     Diabetes ([facility] [address]     Essential hypertension     GERD (gastroesophageal reflux disease)     Hypercholesterolemia 1/22/2018    Hypertension     Ill-defined condition     diverticulitis    Long-term use of high-risk medication 1/22/2018    Neuropathy     Obesity (BMI 30-39.9) 4/30/2019    Other ill-defined conditions(799.89)     IBS, spinal stenosis    Plantar fasciitis     Psychiatric disorder     depression, anxiety    Sleep apnea     uses CPAP    Type 2 diabetes mellitus with diabetic neuropathy, without long-term current use of insulin ([facility] [address] 6/5/2016       Past Surgical History:  Past Surgical History:   Procedure Laterality Date    COLONOSCOPY N/A 3/14/2018    COLONOSCOPY performed by Armandina Lennox, MD [facility] ENDOSCOPY    HX APPENDECTOMY      HX CHOLECYSTECTOMY  11/15/2018    HX HYSTERECTOMY      HX PACEMAKER      IR KYPHOPLASTY LUMBAR  12/22/2020    AL CARDIAC SURG PROCEDURE UNLIST      stent       Family History:  Family History   Problem Relation Age of Onset    Arthritis-osteo Mother     Hypertension Mother     High Cholesterol Mother     Crohn's Disease Mother     Heart Disease Mother     Alcohol abuse Father     High Cholesterol Sister     Hypertension Sister     Thyroid Disease Sister     COPD Sister     High Cholesterol Brother     Hypertension Brother     COPD Brother     COPD Child     Inflammatory Bowel Dz Child        Social History:  Social History     Tobacco Use    Smoking status: Never Smoker    Smokeless tobacco: Never Used   Substance Use Topics    Alcohol use: No    Drug use: No       Allergies: Allergies   Allergen Reactions    Sulfa (Sulfonamide Antibiotics) Swelling    Amoxicillin Swelling         Review of Systems   Review of Systems   Unable to perform ROS: Mental status change     Physical Exam   Physical Exam  Vitals signs and nursing note reviewed. Constitutional:       Appearance: She is well-developed. She is not diaphoretic. Comments: Morbidly obese elderly appearing female in moderate severe distress   HENT:      Head: Normocephalic and atraumatic. Mouth/Throat:      Comments: Extremely dry with cracked lips and tongue  Eyes:      General:         Right eye: No discharge. Left eye: No discharge. Conjunctiva/sclera: Conjunctivae normal.      Pupils: Pupils are equal, round, and reactive to light. Neck:      Musculoskeletal: Normal range of motion and neck supple. Cardiovascular:      Rate and Rhythm: Normal rate and regular rhythm. Heart sounds: Normal heart sounds. No murmur. Pulmonary:      Effort: Pulmonary effort is normal. No respiratory distress. Breath sounds: Normal breath sounds. No wheezing or rales. Abdominal:      General: Bowel sounds are normal. There is no distension. Palpations: Abdomen is soft. Tenderness: There is no abdominal tenderness. Musculoskeletal: Normal range of motion. Skin:     General: Skin is warm and dry. Capillary Refill: Capillary refill takes less than 2 seconds. Coloration: Skin is pale. Findings: No rash. Comments: Diffuse bruising upper and lower extremities   Neurological:      Mental Status: She is lethargic.       GCS: GCS eye subscore is 2. GCS verbal subscore is 3. GCS motor subscore is 4. Cranial Nerves: No cranial nerve deficit. Motor: No abnormal muscle tone. Comments: Unable to assess orientation as patient does not respond to questioning appropriately. She is unable to follow commands. She is moving all extremities equally without any obvious deficit. Diagnostic Study Results     Labs -     Recent Results (from the past 12 hour(s))   GLUCOSE, POC    Collection Time: 01/01/21 10:05 AM   Result Value Ref Range    Glucose (POC) 45 (LL) 65 - 100 mg/dL    Performed by [address], POC    Collection Time: 01/01/21 10:06 AM   Result Value Ref Range    Glucose (POC) 43 (LL) 65 - 100 mg/dL    Performed by [address] [facility] DIFF    Collection Time: 01/01/21 10:18 AM   Result Value Ref Range    WBC 7.4 3.6 - 11.0 K/uL    RBC 3.83 3.80 - 5.20 M/uL    HGB 9.0 (L) 11.5 - 16.0 g/dL    HCT 31.7 (L) 35.0 - 47.0 %    MCV 82.8 80.0 - 99.0 FL    MCH 23.5 (L) 26.0 - 34.0 PG    MCHC 28.4 (L) 30.0 - 36.5 g/dL    RDW 18.6 (H) 11.5 - 14.5 %    PLATELET 251 821 - 838 K/uL    MPV 11.3 8.9 - 12.9 FL    NRBC 0.0 0  WBC    ABSOLUTE NRBC 0.00 0.00 - 0.01 K/uL    NEUTROPHILS 82 (H) 32 - 75 %    LYMPHOCYTES 11 (L) 12 - 49 %    MONOCYTES 5 5 - 13 %    EOSINOPHILS 1 0 - 7 %    BASOPHILS 0 0 - 1 %    IMMATURE GRANULOCYTES 1 (H) 0.0 - 0.5 %    ABS. NEUTROPHILS 6.0 1.8 - 8.0 K/UL    ABS. LYMPHOCYTES 0.8 0.8 - 3.5 K/UL    ABS. MONOCYTES 0.4 0.0 - 1.0 K/UL    ABS. EOSINOPHILS 0.1 0.0 - 0.4 K/UL    ABS. BASOPHILS 0.0 0.0 - 0.1 K/UL    ABS. IMM.  GRANS. 0.1 (H) 0.00 - 0.04 K/UL    DF SMEAR SCANNED      RBC COMMENTS ANISOCYTOSIS  1+        RBC COMMENTS HYPOCHROMIA  1+       METABOLIC PANEL, COMPREHENSIVE    Collection Time: 01/01/21 10:18 AM   Result Value Ref Range    Sodium 141 136 - 145 mmol/L    Potassium 3.7 3.5 - 5.1 mmol/L    Chloride 112 (H) 97 - 108 mmol/L    CO2 26 21 - 32 mmol/L    Anion gap 3 (L) 5 - 15 mmol/L    Glucose 248 (H) 65 - 100 mg/dL    BUN 39 (H) 6 - 20 MG/DL    Creatinine 1.58 (H) 0.55 - 1.02 MG/DL    BUN/Creatinine ratio 25 (H) 12 - 20      GFR est AA 39 (L) >60 ml/min/1.73m2    GFR est non-AA 33 (L) >60 ml/min/1.73m2    Calcium 8.4 (L) 8.5 - 10.1 MG/DL    Bilirubin, total 0.4 0.2 - 1.0 MG/DL    ALT (SGPT) 18 12 - 78 U/L    AST (SGOT) 19 15 - 37 U/L    Alk. phosphatase 176 (H) 45 - 117 U/L    Protein, total 5.6 (L) 6.4 - 8.2 g/dL    Albumin 2.4 (L) 3.5 - 5.0 g/dL    Globulin 3.2 2.0 - 4.0 g/dL    A-G Ratio 0.8 (L) 1.1 - 2.2     TROPONIN I    Collection Time: 01/01/21 10:18 AM   Result Value Ref Range    Troponin-I, Qt. <0.05 <0.05 ng/mL   LIPASE    Collection Time: 01/01/21 10:18 AM   Result Value Ref Range    Lipase 46 (L) 73 - 393 U/L   MAGNESIUM    Collection Time: 01/01/21 10:18 AM   Result Value Ref Range    Magnesium 1.8 1.6 - 2.4 mg/dL   TSH 3RD GENERATION    Collection Time: 01/01/21 10:18 AM   Result Value Ref Range    TSH 0.60 0.36 - 3.74 uIU/mL   GLUCOSE, POC    Collection Time: 01/01/21 10:59 AM   Result Value Ref Range    Glucose (POC) 68 65 - 100 mg/dL    Performed by [address] [place] [address], POC    Collection Time: 01/01/21 11:54 AM   Result Value Ref Range    Glucose (POC) 56 (L) 65 - 100 mg/dL    Performed by Roberto Arvizu RN ([facility])    GLUCOSE, POC    Collection Time: 01/01/21 12:28 PM   Result Value Ref Range    Glucose (POC) 66 65 - 100 mg/dL    Performed by Roberto Arvizu RN ([facility])    GLUCOSE, POC    Collection Time: 01/01/21  2:04 PM   Result Value Ref Range    Glucose (POC) 142 (H) 65 - 100 mg/dL    Performed by Roberto Arvizu RN ([facility])        Radiologic Studies -   CT SPINE LUMB WO CONT   Final Result   IMPRESSION:    1. Acute T12 compression fracture. 2. L1 compression fracture post kyphoplasty. 3. Degenerative stenoses as described above. 4. Trace bilateral pleural effusions. XR CHEST PORT   Final Result   IMPRESSION:    Grossly clear lungs. Underpenetrated.       CT HEAD WO CONT   Final Result IMPRESSION: No acute intracranial abnormality. CT Results  (Last 48 hours)               01/01/21 1353  CT SPINE LUMB WO CONT Final result    Impression:  IMPRESSION:    1. Acute T12 compression fracture. 2. L1 compression fracture post kyphoplasty. 3. Degenerative stenoses as described above. 4. Trace bilateral pleural effusions. Narrative:  CT LUMBAR SPINE WITHOUT CONTRAST:  1/1/2021 1:53 PM       INDICATION: back pain . COMPARISON: 12/13/2020. TECHNIQUE: Multislice helical CT of the lumbar spine was performed without   contrast. Sagittal and coronal reformats were generated. CT dose reduction was   achieved through use of a standardized protocol tailored for this examination   and automatic exposure control for dose modulation. FINDINGS:   A T12 superior endplate compression fracture with approximately 25% vertebral   body height loss is acute. There is trace paraspinous hemorrhage (302-9). An L1   compression fracture is post kyphoplasty. There are trace bilateral pleural   effusions. Left upper pole renal calculi are nonobstructing. Degenerative disease causes the following stenoses:   T11-T12: Mild canal stenosis. Patent neural foramina partially imaged. T12-L1: Slight retropulsion causes moderate canal stenosis at the L1 level. L1-L2: No stenosis. L2-L3: Minimal canal stenosis. L3-L4: Right greater than left neural foraminal stenosis. Mild to moderate canal   stenosis. L4-L5: Mild to moderate canal stenosis. Right and mild left neural foraminal   stenosis. L5-S1: Bilateral neural foraminal stenosis. Minimal canal stenosis. 01/01/21 1033  CT HEAD WO CONT Final result    Impression:  IMPRESSION: No acute intracranial abnormality. Narrative:  HEAD CT WITHOUT CONTRAST: 1/1/2021 10:33 AM       INDICATION: Altered mental status. COMPARISON: 11/12/2018.        PROCEDURE: Axial images of the head were obtained without contrast. Coronal and sagittal reformats were performed. CT dose reduction was achieved through use of   a standardized protocol tailored for this examination and automatic exposure   control for dose modulation. FINDINGS: There is a small old infarction in the left cerebellum. Periventricular and subcortical white matter hypodensity is nonspecific, but   consistent with chronic small vessel ischemic disease. There is central and   peripheral cerebral atrophy. No loss of gray-white differentiation to suggest   late acute or early subacute infarction. No mass effect or intracranial   hemorrhage. CXR Results  (Last 48 hours)               01/01/21 1320  XR CHEST PORT Final result    Impression:  IMPRESSION:    Grossly clear lungs. Underpenetrated. Narrative:  PORTABLE CHEST RADIOGRAPH/S: 1/1/2021 1:20 PM       INDICATION: Altered mental status. COMPARISON: 12/13/2020, 6/15/2020. TECHNIQUE: Portable frontal upright radiograph/s of the chest.       FINDINGS:    The radiograph is underpenetrated technique and patient body habitus and   portable technique. The lungs are grossly clear. No large pneumothorax or   pleural effusion. The heart is probably enlarged, even given portable technique. A pacemaker is in place. Medical Decision Making   I am the first provider for this patient. I reviewed the vital signs, available nursing notes, past medical history, past surgical history, family history and social history. Vital Signs-Reviewed the patient's vital signs.   Patient Vitals for the past 12 hrs:   Temp Pulse Resp BP SpO2   01/01/21 1412  85 25  (!) 77 %   01/01/21 1359  80 24  (!) 87 %   01/01/21 1334  80 29  92 %   01/01/21 1257  81 26  91 %   01/01/21 1251  80 25  95 %   01/01/21 1226  80 24  95 %   01/01/21 1204  80 26  97 %   01/01/21 1006 97.9 °F (36.6 °C) 83  (!) 135/99 94 %       Pulse Oximetry Analysis - 94% on RA    Cardiac Monitor:   Rate: 80bpm  Rhythm: Normal Sinus Rhythm      Records Reviewed: Nursing Notes, Old Medical Records, Ambulance Run Sheet, Previous Radiology Studies and Previous Laboratory Studies    Provider Notes (Medical Decision Making):   MDM: Middle-aged female who is altered with repetitive responses of \"leave me alone\". Her blood sugar was low in the field with a recheck of 77 and given D10. She does appear edematous diffusely and pupils are sluggish bilaterally. There is no obvious focal deficit neurologically. We will send patient for CT, rule out metabolic encephalopathy and recurrent hypoglycemia with possible sepsis. Requesting rectal temperature as oral temperatures not accurate in this patient. ED Course:   Initial assessment performed. The patients presenting problems have been discussed, and they are in agreement with the care plan formulated and outlined with them. I have encouraged them to ask questions as they arise throughout their visit. EKG interpretation: (Preliminary)  Rhythm: V paced at 80 bpm.  This EKG was interpreted by ED Provider Clair Wells MD    PROGRESS NOTE:  10:20 AM  Pt in CT. Await repeat Accu-Chek after D50.    11:15 AM  CT without acute pathology. Repeat Accu-Chek less than 70. Request D10 drip. Continue to await rectal temp and urinalysis. 12:15 PM  Continues with hypoglycemia despite D10. Will discuss with hospitalist for admission. Urinalysis pending. CONSULT NOTE:   12:39 PM  Clair Wells MD spoke with Dr Jaqueline Olson,   Specialty: Hospitalist  Discussed pt's hx, disposition, and available diagnostic and imaging results. Reviewed care plans. Consultant agrees with plans as outlined. He will evaluate the patient for admission. 2:08 PM   Received call by nursing. Patient has a bed available/assigned upstairs which is ready.   Notified that her saturation is 89% and staying low so she was started on nasal cannula oxygen and is 90 to 91% on 4 L nasal cannula. X-ray reviewed which does not show any infiltrate or concern for Covid pneumonia. Call placed to the hospitalist.    2:19 PM   Discussed hypoxia with the hospitalist who does not feel he would like her on contact and droplet and does not feel her room needs to change at this time. Continue current management. Critical Care Time:   CRITICAL CARE NOTE :  2:19 PM   IMPENDING DETERIORATION -Airway, Respiratory, Cardiovascular, CNS, Metabolic, Renal and Hepatic  ASSOCIATED RISK FACTORS - Hypoxia, Dysrhythmia, Metabolic changes, Dehydration and CNS Decompensation  MANAGEMENT- Bedside Assessment and Supervision of Care  INTERPRETATION -  Xrays, ECG and Blood Pressure  INTERVENTIONS - hemodynamic mngmt and metabolic intervention  CASE REVIEW - Hospitalist and Nursing  TREATMENT RESPONSE -Stable  PERFORMED BY - Self    NOTES   :  I have spent 75 minutes of critical care time involved in lab review, consultations with specialist, family decision- making, bedside attention and documentation. During this entire length of time I was immediately available to the patient. Georgi Cook. MD Trevor        Diagnosis     Clinical Impression:   1. Hypoglycemia    2. Disorientation    3. AYLIN (acute kidney injury) [facility])        PLAN:  Admission ED [facility] for further management and care      Please note, this dictation was completed with [employer], the computer voice recognition software. Quite often unanticipated grammatical, syntax, homophones, and other interpretive errors are inadvertently transcribed by the computer software. Please disregard these errors. Please excuse any errors that have escaped final proof reading.

## 2021-01-01 NOTE — PROGRESS NOTES
Problem: Falls - Risk of  Goal: *Absence of Falls  Description: Document Thormeme Bare Fall Risk and appropriate interventions in the flowsheet.   Outcome: Progressing Towards Goal  Note: Fall Risk Interventions:                                Problem: Patient Education: Go to Patient Education Activity  Goal: Patient/Family Education  Outcome: Progressing Towards Goal

## 2021-01-01 NOTE — ED TRIAGE NOTES
Pt came to the ED by EMS from home. Pt was seen by EMS last night and refused transport and treatment. At 5am, family checked up on PT who appeared to be fine but would not talk. Family attempted to wake PT at 0830 and would not. Pt BS was 77 and given 250ml of D10 by EMS. Pt hx of chronic back and has a pacemaker.   Pt also has hx of multiple prescriptions for home treatment

## 2021-01-02 LAB
ANION GAP SERPL CALC-SCNC: 7 MMOL/L (ref 5–15)
ATRIAL RATE: 84 BPM
BUN SERPL-MCNC: 29 MG/DL (ref 6–20)
BUN/CREAT SERPL: 21 (ref 12–20)
CALCIUM SERPL-MCNC: 8.8 MG/DL (ref 8.5–10.1)
CALCULATED R AXIS, ECG10: -86 DEGREES
CALCULATED T AXIS, ECG11: 85 DEGREES
CHLORIDE SERPL-SCNC: 111 MMOL/L (ref 97–108)
CO2 SERPL-SCNC: 21 MMOL/L (ref 21–32)
CREAT SERPL-MCNC: 1.41 MG/DL (ref 0.55–1.02)
DIAGNOSIS, 93000: NORMAL
ERYTHROCYTE [DISTWIDTH] IN BLOOD BY AUTOMATED COUNT: 18.7 % (ref 11.5–14.5)
GLUCOSE BLD STRIP.AUTO-MCNC: 101 MG/DL (ref 65–100)
GLUCOSE BLD STRIP.AUTO-MCNC: 105 MG/DL (ref 65–100)
GLUCOSE BLD STRIP.AUTO-MCNC: 108 MG/DL (ref 65–100)
GLUCOSE BLD STRIP.AUTO-MCNC: 116 MG/DL (ref 65–100)
GLUCOSE BLD STRIP.AUTO-MCNC: 121 MG/DL (ref 65–100)
GLUCOSE BLD STRIP.AUTO-MCNC: 122 MG/DL (ref 65–100)
GLUCOSE BLD STRIP.AUTO-MCNC: 189 MG/DL (ref 65–100)
GLUCOSE BLD STRIP.AUTO-MCNC: 210 MG/DL (ref 65–100)
GLUCOSE BLD STRIP.AUTO-MCNC: 217 MG/DL (ref 65–100)
GLUCOSE BLD STRIP.AUTO-MCNC: 256 MG/DL (ref 65–100)
GLUCOSE BLD STRIP.AUTO-MCNC: 311 MG/DL (ref 65–100)
GLUCOSE BLD STRIP.AUTO-MCNC: 87 MG/DL (ref 65–100)
GLUCOSE SERPL-MCNC: 86 MG/DL (ref 65–100)
HCT VFR BLD AUTO: 33.5 % (ref 35–47)
HGB BLD-MCNC: 9.1 G/DL (ref 11.5–16)
MCH RBC QN AUTO: 23.6 PG (ref 26–34)
MCHC RBC AUTO-ENTMCNC: 27.2 G/DL (ref 30–36.5)
MCV RBC AUTO: 86.8 FL (ref 80–99)
NRBC # BLD: 0 K/UL (ref 0–0.01)
NRBC BLD-RTO: 0 PER 100 WBC
PLATELET # BLD AUTO: 199 K/UL (ref 150–400)
PMV BLD AUTO: 12.1 FL (ref 8.9–12.9)
POTASSIUM SERPL-SCNC: 4.1 MMOL/L (ref 3.5–5.1)
Q-T INTERVAL, ECG07: 450 MS
QRS DURATION, ECG06: 156 MS
QTC CALCULATION (BEZET), ECG08: 519 MS
RBC # BLD AUTO: 3.86 M/UL (ref 3.8–5.2)
SERVICE CMNT-IMP: ABNORMAL
SERVICE CMNT-IMP: NORMAL
SODIUM SERPL-SCNC: 139 MMOL/L (ref 136–145)
VENTRICULAR RATE, ECG03: 80 BPM
WBC # BLD AUTO: 7.9 K/UL (ref 3.6–11)

## 2021-01-02 PROCEDURE — 74011000258 HC RX REV CODE- 258: Performed by: INTERNAL MEDICINE

## 2021-01-02 PROCEDURE — 94640 AIRWAY INHALATION TREATMENT: CPT

## 2021-01-02 PROCEDURE — 74011000250 HC RX REV CODE- 250: Performed by: STUDENT IN AN ORGANIZED HEALTH CARE EDUCATION/TRAINING PROGRAM

## 2021-01-02 PROCEDURE — 80048 BASIC METABOLIC PNL TOTAL CA: CPT

## 2021-01-02 PROCEDURE — 2709999900 HC NON-CHARGEABLE SUPPLY

## 2021-01-02 PROCEDURE — 77010033678 HC OXYGEN DAILY

## 2021-01-02 PROCEDURE — 74011000258 HC RX REV CODE- 258: Performed by: NURSE PRACTITIONER

## 2021-01-02 PROCEDURE — 85027 COMPLETE CBC AUTOMATED: CPT

## 2021-01-02 PROCEDURE — 74011250637 HC RX REV CODE- 250/637: Performed by: STUDENT IN AN ORGANIZED HEALTH CARE EDUCATION/TRAINING PROGRAM

## 2021-01-02 PROCEDURE — 36415 COLL VENOUS BLD VENIPUNCTURE: CPT

## 2021-01-02 PROCEDURE — 82962 GLUCOSE BLOOD TEST: CPT

## 2021-01-02 PROCEDURE — 65660000000 HC RM CCU STEPDOWN

## 2021-01-02 RX ORDER — DEXTROSE MONOHYDRATE 100 MG/ML
25 INJECTION, SOLUTION INTRAVENOUS CONTINUOUS
Status: DISCONTINUED | OUTPATIENT
Start: 2021-01-02 | End: 2021-01-02

## 2021-01-02 RX ORDER — DEXTROSE MONOHYDRATE 50 MG/ML
75 INJECTION, SOLUTION INTRAVENOUS CONTINUOUS
Status: DISCONTINUED | OUTPATIENT
Start: 2021-01-02 | End: 2021-01-03

## 2021-01-02 RX ADMIN — PANTOPRAZOLE SODIUM 40 MG: 40 TABLET, DELAYED RELEASE ORAL at 09:16

## 2021-01-02 RX ADMIN — CLONAZEPAM 0.5 MG: 1 TABLET ORAL at 09:16

## 2021-01-02 RX ADMIN — DEXTROSE MONOHYDRATE 25 ML/HR: 10 INJECTION, SOLUTION INTRAVENOUS at 05:01

## 2021-01-02 RX ADMIN — ASPIRIN 81 MG: 81 TABLET, CHEWABLE ORAL at 09:17

## 2021-01-02 RX ADMIN — HYDRALAZINE HYDROCHLORIDE 50 MG: 50 TABLET, FILM COATED ORAL at 17:31

## 2021-01-02 RX ADMIN — PREGABALIN 300 MG: 100 CAPSULE ORAL at 09:16

## 2021-01-02 RX ADMIN — DEXTROSE MONOHYDRATE 25 ML/HR: 10 INJECTION, SOLUTION INTRAVENOUS at 08:00

## 2021-01-02 RX ADMIN — Medication 20 ML: at 14:00

## 2021-01-02 RX ADMIN — Medication 10 ML: at 05:01

## 2021-01-02 RX ADMIN — ATORVASTATIN CALCIUM 40 MG: 40 TABLET, FILM COATED ORAL at 21:33

## 2021-01-02 RX ADMIN — DEXTROSE MONOHYDRATE 75 ML/HR: 50 INJECTION, SOLUTION INTRAVENOUS at 11:00

## 2021-01-02 RX ADMIN — Medication 10 ML: at 21:35

## 2021-01-02 RX ADMIN — IPRATROPIUM BROMIDE AND ALBUTEROL SULFATE 3 ML: .5; 3 SOLUTION RESPIRATORY (INHALATION) at 14:48

## 2021-01-02 RX ADMIN — BUMETANIDE 1 MG: 1 TABLET ORAL at 09:17

## 2021-01-02 RX ADMIN — PREGABALIN 300 MG: 100 CAPSULE ORAL at 17:31

## 2021-01-02 RX ADMIN — IPRATROPIUM BROMIDE AND ALBUTEROL SULFATE 3 ML: .5; 3 SOLUTION RESPIRATORY (INHALATION) at 20:43

## 2021-01-02 RX ADMIN — CALCIUM 500 MG: 500 TABLET ORAL at 09:17

## 2021-01-02 RX ADMIN — LISINOPRIL 20 MG: 20 TABLET ORAL at 09:17

## 2021-01-02 RX ADMIN — DABIGATRAN ETEXILATE MESYLATE 150 MG: 150 CAPSULE ORAL at 17:31

## 2021-01-02 RX ADMIN — IPRATROPIUM BROMIDE AND ALBUTEROL SULFATE 3 ML: .5; 3 SOLUTION RESPIRATORY (INHALATION) at 01:55

## 2021-01-02 RX ADMIN — HYDRALAZINE HYDROCHLORIDE 50 MG: 50 TABLET, FILM COATED ORAL at 09:16

## 2021-01-02 RX ADMIN — ARFORMOTEROL TARTRATE: 15 SOLUTION RESPIRATORY (INHALATION) at 07:40

## 2021-01-02 RX ADMIN — VENLAFAXINE HYDROCHLORIDE 150 MG: 150 CAPSULE, EXTENDED RELEASE ORAL at 17:31

## 2021-01-02 RX ADMIN — POLYETHYLENE GLYCOL 3350 17 G: 17 POWDER, FOR SOLUTION ORAL at 21:33

## 2021-01-02 RX ADMIN — ARFORMOTEROL TARTRATE: 15 SOLUTION RESPIRATORY (INHALATION) at 20:48

## 2021-01-02 RX ADMIN — METOPROLOL SUCCINATE 100 MG: 50 TABLET, EXTENDED RELEASE ORAL at 09:16

## 2021-01-02 RX ADMIN — VENLAFAXINE HYDROCHLORIDE 150 MG: 150 CAPSULE, EXTENDED RELEASE ORAL at 09:17

## 2021-01-02 RX ADMIN — ISOSORBIDE MONONITRATE 60 MG: 30 TABLET, EXTENDED RELEASE ORAL at 09:16

## 2021-01-02 RX ADMIN — IPRATROPIUM BROMIDE AND ALBUTEROL SULFATE 3 ML: .5; 3 SOLUTION RESPIRATORY (INHALATION) at 07:35

## 2021-01-02 RX ADMIN — DABIGATRAN ETEXILATE MESYLATE 150 MG: 150 CAPSULE ORAL at 09:21

## 2021-01-02 RX ADMIN — CYCLOBENZAPRINE 10 MG: 10 TABLET, FILM COATED ORAL at 21:33

## 2021-01-02 NOTE — PROGRESS NOTES
Problem: Falls - Risk of  Goal: *Absence of Falls  Description: Document Brennan Linda Fall Risk and appropriate interventions in the flowsheet. Outcome: Progressing Towards Goal  Variance Patient Condition  Impact: High  Note: Fall Risk Interventions:            Medication Interventions: Assess postural VS orthostatic hypotension, Bed/chair exit alarm, Evaluate medications/consider consulting pharmacy, Patient to call before getting OOB, Teach patient to arise slowly    Elimination Interventions: Call light in reach, Elevated toilet seat, Patient to call for help with toileting needs, Stay With Me (per policy), Toileting schedule/hourly rounds, Urinal in reach              Problem: Patient Education: Go to Patient Education Activity  Goal: Patient/Family Education  Outcome: Progressing Towards Goal     Problem: Risk for Spread of Infection  Goal: Prevent transmission of infectious organism to others  Description: Prevent the transmission of infectious organisms to other patients, staff members, and visitors.   Outcome: Progressing Towards Goal     Problem: Patient Education:  Go to Education Activity  Goal: Patient/Family Education  Outcome: Progressing Towards Goal     Problem: Breathing Pattern - Ineffective  Goal: *Absence of hypoxia  Outcome: Progressing Towards Goal  Goal: *Use of effective breathing techniques  Outcome: Progressing Towards Goal  Goal: *PALLIATIVE CARE:  Alleviation of Dyspnea  Outcome: Progressing Towards Goal     Problem: Patient Education: Go to Patient Education Activity  Goal: Patient/Family Education  Outcome: Progressing Towards Goal     Problem: Diabetes Maintenance:Admission  Goal: Activity/Safety  Outcome: Progressing Towards Goal  Goal: Diagnostic Tests/Procedures  Outcome: Progressing Towards Goal  Goal: Nutrition  Outcome: Progressing Towards Goal  Goal: Medications  Outcome: Progressing Towards Goal  Goal: Treatments/Interventions/Procedures  Outcome: Progressing Towards Goal Problem: Diabetes Maintenance:Ongoing  Goal: Activity/Safety  Outcome: Progressing Towards Goal  Goal: Nutrition  Outcome: Progressing Towards Goal  Goal: Medications  Outcome: Progressing Towards Goal  Goal: Treatments/Interventsions/Procedures  Outcome: Progressing Towards Goal  Goal: *Blood Glucose 80 to 180 md/dl  Outcome: Progressing Towards Goal     Problem: Diabetes Maintenance:Discharge Outcomes  Goal: *Describes follow-up/return visits to physicians  Outcome: Progressing Towards Goal  Goal: *Blood glucose at patient's target range or approaching  Outcome: Progressing Towards Goal  Goal: *Aware of nutrition guidelines  Outcome: Progressing Towards Goal  Goal: *Verbalizes information about medication  Description: Verbalizes name, dosage, time, side effects, and number of days to  continue medications.   Outcome: Progressing Towards Goal  Goal: *Describes goals, rules, symptoms, and treatments  Description: Describes blood glucose goals, monitoring, sick day rules,  hypo/hyperglycemia prevention, symptoms, and treatment  Outcome: Progressing Towards Goal  Goal: *Describes available outpatient diabetes resources and support systems  Outcome: Progressing Towards Goal

## 2021-01-02 NOTE — PROGRESS NOTES
Received message from patient's nurse Josr Moran stating :    pt does not have prn treatment for low BS would you be able to add protocol for patient? thanks     Discussion / orders:    Patient was admitted for hypoglycemia this evening and is on every hour blood glucose monitoring  Ordered hypoglycemic panel         Please note that this note was dictated using Dragon computer voice recognition software. Quite often unanticipated grammatical, syntax, homophones, and other interpretive errors are inadvertently transcribed by the computer software. Please disregard these errors. Please excuse any errors that have escaped final proofreading.

## 2021-01-02 NOTE — PROGRESS NOTES
ADULT PROTOCOL: JET AEROSOL ASSESSMENT    Patient  Kevin Auguste     78 y.o.   female     1/1/2021  10:41 PM    Breath Sounds Pre Procedure: Right Breath Sounds: Diminished                               Left Breath Sounds: Diminished    Breath Sounds Post Procedure: Right Breath Sounds: Diminished                                 Left Breath Sounds: Diminished    Breathing pattern: Pre procedure Breathing Pattern: Regular          Post procedure Breathing Pattern: Regular    Heart Rate: Pre procedure Pulse: 80           Post procedure Pulse: 80    Resp Rate: Pre procedure Respirations: 20           Post procedure Respirations: 20          Cough: Pre procedure Cough: Non-productive               Post procedure Cough: Non-productive    Oxygen: O2 Device: Nasal cannula   Flow rate (L/min) 4     Changed: NO    SpO2: Pre procedure SpO2: 100 %   with oxygen              Post procedure SpO2: 100 %  with oxygen    Nebulizer Therapy: Current medications Aerosolized Medications: Brovana, Pulmicort      Changed: YES    Problem List:   Patient Active Problem List   Diagnosis Code    Unspecified hereditary and idiopathic peripheral neuropathy G60.9    HBP (high blood pressure) I10    Spinal stenosis, lumbar region, without neurogenic claudication M48.061    Essential and other specified forms of tremor G25.0, G25.2    IBS (irritable bowel syndrome) K58.9    Depression F32.9    Migraine with aura, without mention of intractable migraine without mention of status migrainosus G43. 109    Right knee DJD M17.11    B12 deficiency E53.8    Ataxia R27.0    Foot pain, right X91.490    Acute systolic heart failure (HCC) I50.21    Fever R50.9    UTI (urinary tract infection) N39.0    Lower abdominal pain R10.30    Type 2 diabetes mellitus with diabetic neuropathy, without long-term current use of insulin (HCC) E11.40    Chronic atrial fibrillation (HCC) G94.13    Systolic CHF, chronic (HCC) I50.22    Cellulitis of left lower leg L03. 116    Anxiety F41.9    Hypercholesterolemia E78.00    Long-term use of high-risk medication Z79.899    Hypokalemia E87.6    Ischemic cardiomyopathy I25.5    CAD (coronary artery disease) I25.10    Epigastric abdominal pain R10.13    S/P placement of cardiac pacemaker Z95.0    Stage 3 chronic kidney disease N18.30    Chronic cholecystitis K81.1    Asthma J45.909    Obesity (BMI 30-39. 9) E66.9    Acute asthma exacerbation J[address]. [address] extremity edema R60.0    Venous stasis dermatitis of both lower extremities I87.2    Ingrown toenail of left foot L60.0    Type 2 diabetes with nephropathy (HCC) E11.21    Respiratory failure (HCC) J96.90    Lower back pain M54.5    Hypoxia R09.02    Lethargy R53.83    Hypoglycemia E16.2       Respiratory Therapist: Cameron Peña, RT

## 2021-01-02 NOTE — PROGRESS NOTES
1900-Bedside and Verbal shift change report given to Millicent Claire (oncoming nurse) by [facility], RN (offgoing nurse). Report included the following information SBAR, Kardex, ED Summary, Intake/Output, MAR, Recent Results, Med Rec Status, Cardiac Rhythm NSR and Alarm Parameters . 2100-Jesus Wilkinson May () updated on patient condition. IliaRamona- NP Purveyor notified through [facility] Patient admitted with hypoglycemia and AMS. Patient is hourly BG. D10% has been discontinued. Do you want to restart fluids. BG slowly dropping, patient refusing to eat and drink. D50 given once during     0700-End of Shift Note    Bedside shift change report given to Samir Chery RN(oncoming nurse) by Dean Sanchez (offgoing nurse). Report included the following information SBAR, Kardex, ED Summary, Intake/Output, MAR, Recent Results, Med Rec Status, Cardiac Rhythm Paced/NSR and Alarm Parameters     Shift worked:  [id number]     Shift summary and any significant changes:     none     Concerns for physician to address:  none     Zone phone for oncoming shift:   [phone]       Activity:  Activity Level: Bed Rest  Number times ambulated in hallways past shift: 0  Number of times OOB to chair past shift: 0    Cardiac:   Cardiac Monitoring: Yes      Cardiac Rhythm: Normal sinus rhythm    Access:   Current line(s): PIV     Genitourinary:   Urinary status: external catheter    Respiratory:   O2 Device: Nasal cannula  Chronic home O2 use?: NO  Incentive spirometer at bedside: NO     GI:     Current diet:  DIET DIABETIC CONSISTENT CARB Regular  Passing flatus: YES  Tolerating current diet: NO       Pain Management:   Patient states pain is manageable on current regimen: NO    Skin:  Preet Score: 13  Interventions: speciality bed, float heels, PT/OT consult, limit briefs, internal/external urinary devices and nutritional support     Patient Safety:  Fall Score:  Total Score: 2  Interventions: bed/chair alarm, gripper socks, pt to call before getting OOB and stay with me (per policy)       Length of Stay:  Expected LOS: - - -  Actual LOS: [address]

## 2021-01-02 NOTE — PROGRESS NOTES
Received message from patient's nurse Lex Marcano stating :    Patient admitted with hypoglycemia and AMS. Patient is hourly BG. D10% has been discontinued. Do you want to restart fluids. BG slowly dropping, patient refusing to eat and drink. D50 given once during shift. Discussion / orders:    Reordered D10 infusion at 25 ml/hr for another 4 hours         Please note that this note was dictated using Dragon computer voice recognition software. Quite often unanticipated grammatical, syntax, homophones, and other interpretive errors are inadvertently transcribed by the computer software. Please disregard these errors. Please excuse any errors that have escaped final proofreading.

## 2021-01-03 LAB
ANION GAP SERPL CALC-SCNC: 7 MMOL/L (ref 5–15)
APPEARANCE UR: CLEAR
BILIRUB UR QL: NEGATIVE
BUN SERPL-MCNC: 32 MG/DL (ref 6–20)
BUN/CREAT SERPL: 19 (ref 12–20)
CALCIUM SERPL-MCNC: 8.5 MG/DL (ref 8.5–10.1)
CHLORIDE SERPL-SCNC: 112 MMOL/L (ref 97–108)
CO2 SERPL-SCNC: 24 MMOL/L (ref 21–32)
COLOR UR: NORMAL
CREAT SERPL-MCNC: 1.66 MG/DL (ref 0.55–1.02)
ERYTHROCYTE [DISTWIDTH] IN BLOOD BY AUTOMATED COUNT: 18.7 % (ref 11.5–14.5)
GLUCOSE BLD STRIP.AUTO-MCNC: 133 MG/DL (ref 65–100)
GLUCOSE BLD STRIP.AUTO-MCNC: 147 MG/DL (ref 65–100)
GLUCOSE BLD STRIP.AUTO-MCNC: 183 MG/DL (ref 65–100)
GLUCOSE BLD STRIP.AUTO-MCNC: 194 MG/DL (ref 65–100)
GLUCOSE SERPL-MCNC: 155 MG/DL (ref 65–100)
GLUCOSE UR STRIP.AUTO-MCNC: NEGATIVE MG/DL
HCT VFR BLD AUTO: 28.3 % (ref 35–47)
HGB BLD-MCNC: 8.1 G/DL (ref 11.5–16)
HGB UR QL STRIP: NEGATIVE
KETONES UR QL STRIP.AUTO: NEGATIVE MG/DL
LEUKOCYTE ESTERASE UR QL STRIP.AUTO: NEGATIVE
MCH RBC QN AUTO: 23.3 PG (ref 26–34)
MCHC RBC AUTO-ENTMCNC: 28.6 G/DL (ref 30–36.5)
MCV RBC AUTO: 81.6 FL (ref 80–99)
NITRITE UR QL STRIP.AUTO: NEGATIVE
NRBC # BLD: 0.02 K/UL (ref 0–0.01)
NRBC BLD-RTO: 0.4 PER 100 WBC
PH UR STRIP: 6 [PH] (ref 5–8)
PLATELET # BLD AUTO: 187 K/UL (ref 150–400)
PMV BLD AUTO: 11.6 FL (ref 8.9–12.9)
POTASSIUM SERPL-SCNC: 3.8 MMOL/L (ref 3.5–5.1)
PROT UR STRIP-MCNC: NEGATIVE MG/DL
RBC # BLD AUTO: 3.47 M/UL (ref 3.8–5.2)
SERVICE CMNT-IMP: ABNORMAL
SODIUM SERPL-SCNC: 143 MMOL/L (ref 136–145)
SP GR UR REFRACTOMETRY: 1.01 (ref 1–1.03)
UROBILINOGEN UR QL STRIP.AUTO: 0.2 EU/DL (ref 0.2–1)
WBC # BLD AUTO: 5.3 K/UL (ref 3.6–11)

## 2021-01-03 PROCEDURE — 80048 BASIC METABOLIC PNL TOTAL CA: CPT

## 2021-01-03 PROCEDURE — 85027 COMPLETE CBC AUTOMATED: CPT

## 2021-01-03 PROCEDURE — 82962 GLUCOSE BLOOD TEST: CPT

## 2021-01-03 PROCEDURE — 74011250637 HC RX REV CODE- 250/637: Performed by: STUDENT IN AN ORGANIZED HEALTH CARE EDUCATION/TRAINING PROGRAM

## 2021-01-03 PROCEDURE — 2709999900 HC NON-CHARGEABLE SUPPLY

## 2021-01-03 PROCEDURE — 81003 URINALYSIS AUTO W/O SCOPE: CPT

## 2021-01-03 PROCEDURE — 36415 COLL VENOUS BLD VENIPUNCTURE: CPT

## 2021-01-03 PROCEDURE — 74011000250 HC RX REV CODE- 250: Performed by: STUDENT IN AN ORGANIZED HEALTH CARE EDUCATION/TRAINING PROGRAM

## 2021-01-03 PROCEDURE — 94640 AIRWAY INHALATION TREATMENT: CPT

## 2021-01-03 PROCEDURE — 77030038269 HC DRN EXT URIN PURWCK BARD -A

## 2021-01-03 PROCEDURE — 65660000000 HC RM CCU STEPDOWN

## 2021-01-03 PROCEDURE — 74011250637 HC RX REV CODE- 250/637: Performed by: INTERNAL MEDICINE

## 2021-01-03 RX ORDER — AMOXICILLIN 250 MG
1 CAPSULE ORAL 2 TIMES DAILY
Status: COMPLETED | OUTPATIENT
Start: 2021-01-03 | End: 2021-01-04

## 2021-01-03 RX ORDER — CLONAZEPAM 1 MG/1
0.5 TABLET ORAL
Status: DISCONTINUED | OUTPATIENT
Start: 2021-01-03 | End: 2021-01-07 | Stop reason: HOSPADM

## 2021-01-03 RX ORDER — INSULIN LISPRO 100 [IU]/ML
INJECTION, SOLUTION INTRAVENOUS; SUBCUTANEOUS
Status: DISCONTINUED | OUTPATIENT
Start: 2021-01-03 | End: 2021-01-07 | Stop reason: HOSPADM

## 2021-01-03 RX ORDER — DEXTROSE MONOHYDRATE 50 MG/ML
25 INJECTION, SOLUTION INTRAVENOUS CONTINUOUS
Status: DISCONTINUED | OUTPATIENT
Start: 2021-01-04 | End: 2021-01-06

## 2021-01-03 RX ORDER — MAGNESIUM SULFATE 100 %
4 CRYSTALS MISCELLANEOUS AS NEEDED
Status: DISCONTINUED | OUTPATIENT
Start: 2021-01-03 | End: 2021-01-07 | Stop reason: HOSPADM

## 2021-01-03 RX ORDER — DEXTROSE 50 % IN WATER (D50W) INTRAVENOUS SYRINGE
12.5-25 AS NEEDED
Status: DISCONTINUED | OUTPATIENT
Start: 2021-01-03 | End: 2021-01-07 | Stop reason: HOSPADM

## 2021-01-03 RX ADMIN — IPRATROPIUM BROMIDE AND ALBUTEROL SULFATE 3 ML: .5; 3 SOLUTION RESPIRATORY (INHALATION) at 07:28

## 2021-01-03 RX ADMIN — CLONAZEPAM 0.5 MG: 1 TABLET ORAL at 22:09

## 2021-01-03 RX ADMIN — PREGABALIN 300 MG: 100 CAPSULE ORAL at 08:54

## 2021-01-03 RX ADMIN — PREGABALIN 300 MG: 100 CAPSULE ORAL at 17:40

## 2021-01-03 RX ADMIN — DABIGATRAN ETEXILATE MESYLATE 150 MG: 150 CAPSULE ORAL at 08:56

## 2021-01-03 RX ADMIN — ARFORMOTEROL TARTRATE: 15 SOLUTION RESPIRATORY (INHALATION) at 20:59

## 2021-01-03 RX ADMIN — ATORVASTATIN CALCIUM 40 MG: 40 TABLET, FILM COATED ORAL at 22:09

## 2021-01-03 RX ADMIN — ISOSORBIDE MONONITRATE 60 MG: 30 TABLET, EXTENDED RELEASE ORAL at 08:54

## 2021-01-03 RX ADMIN — DOCUSATE SODIUM - SENNOSIDES 1 TABLET: 50; 8.6 TABLET, FILM COATED ORAL at 12:58

## 2021-01-03 RX ADMIN — VENLAFAXINE HYDROCHLORIDE 150 MG: 150 CAPSULE, EXTENDED RELEASE ORAL at 08:56

## 2021-01-03 RX ADMIN — Medication 10 ML: at 13:04

## 2021-01-03 RX ADMIN — IPRATROPIUM BROMIDE AND ALBUTEROL SULFATE 3 ML: .5; 3 SOLUTION RESPIRATORY (INHALATION) at 13:42

## 2021-01-03 RX ADMIN — CALCIUM 500 MG: 500 TABLET ORAL at 08:54

## 2021-01-03 RX ADMIN — HYDRALAZINE HYDROCHLORIDE 50 MG: 50 TABLET, FILM COATED ORAL at 19:47

## 2021-01-03 RX ADMIN — METOPROLOL SUCCINATE 100 MG: 50 TABLET, EXTENDED RELEASE ORAL at 08:54

## 2021-01-03 RX ADMIN — LISINOPRIL 20 MG: 20 TABLET ORAL at 08:54

## 2021-01-03 RX ADMIN — HYDRALAZINE HYDROCHLORIDE 50 MG: 50 TABLET, FILM COATED ORAL at 08:55

## 2021-01-03 RX ADMIN — IPRATROPIUM BROMIDE AND ALBUTEROL SULFATE 3 ML: .5; 3 SOLUTION RESPIRATORY (INHALATION) at 20:54

## 2021-01-03 RX ADMIN — BUMETANIDE 1 MG: 1 TABLET ORAL at 08:54

## 2021-01-03 RX ADMIN — PANTOPRAZOLE SODIUM 40 MG: 40 TABLET, DELAYED RELEASE ORAL at 08:55

## 2021-01-03 RX ADMIN — ARFORMOTEROL TARTRATE: 15 SOLUTION RESPIRATORY (INHALATION) at 07:28

## 2021-01-03 RX ADMIN — ASPIRIN 81 MG: 81 TABLET, CHEWABLE ORAL at 08:54

## 2021-01-03 RX ADMIN — Medication 10 ML: at 05:23

## 2021-01-03 RX ADMIN — DOCUSATE SODIUM - SENNOSIDES 1 TABLET: 50; 8.6 TABLET, FILM COATED ORAL at 17:40

## 2021-01-03 RX ADMIN — Medication 10 ML: at 22:09

## 2021-01-03 RX ADMIN — VENLAFAXINE HYDROCHLORIDE 150 MG: 150 CAPSULE, EXTENDED RELEASE ORAL at 17:40

## 2021-01-03 NOTE — PROGRESS NOTES
7:30 AM Bedside report received from Mahnaz Licea, [address] [place] [address].    12:00 PM Dr. Khoi Doss at bedside on rounds. MD aware patient continues with c/o constipation. MD states he will add new orders. See MAR.    1:00 PM U/A with reflex sent per Dr. Khoi Doss.

## 2021-01-03 NOTE — PROGRESS NOTES
End of Shift Note    Bedside shift change report given to Sabine Saunders RN (oncoming nurse) by Ed Son RN (offgoing nurse). Report included the following information SBAR, Kardex, Intake/Output, MAR, Recent Results, Med Rec Status and Cardiac Rhythm PACED    Shift worked:  7a-3p     Shift summary and any significant changes:    Patient had large BM with scant bright red blood noted. Patient states she has history of hemmorroids and reports that she strained with BM. Patient to be NPO at [facility] for IR consult. D5 to start at [place] at 25 cc/hr, see MAR. Concerns for physician to address:  Blood noted with bowel movement Dr. Minnie Wall notified. Zone phone for oncoming shift:   [phone]       Activity:  Activity Level: Up with Assistance  Number times ambulated in hallways past shift: 0  Number of times OOB to chair past shift: 0    Cardiac:   Cardiac Monitoring: Yes      Cardiac Rhythm: Paced    Access:   Current line(s): PIV     Genitourinary:   Urinary status: incontinent    Respiratory:   O2 Device: Room air  Chronic home O2 use?: NO  Incentive spirometer at bedside: NO     GI:  Last BM: 1/3/2021  Current diet:  DIET DIABETIC CONSISTENT CARB Regular  DIET NPO  Passing flatus: YES  Tolerating current diet: YES  % Diet Eaten: 75 %    Pain Management:   Patient states pain is manageable on current regimen: YES    Skin:  Preet Score: 16  Interventions: turn team, float heels, increase time out of bed, PT/OT consult, limit briefs and internal/external urinary devices    Patient Safety:  Fall Score:  Total Score: 2  Interventions: bed/chair alarm, assistive device (walker, cane, etc), gripper socks, pt to call before getting OOB and stay with me (per policy)       Length of Stay:  Expected LOS: - - -  Actual LOS: Madisyn Mcmanus RN

## 2021-01-03 NOTE — PROGRESS NOTES
Bedside shift change report given to Vincent (oncoming nurse) by Yessy Koch (offgoing nurse). Report included the following information SBAR, MAR, Recent Results and Cardiac Rhythm V-Paced. End of Shift Note    Bedside shift change report given to Carmella Rocha (oncoming nurse) by Bashir Covington (offgoing nurse). Report included the following information SBAR, MAR and Cardiac Rhythm Paced    Shift worked:  3-7     Shift summary and any significant changes:     none     Concerns for physician to address:  none     Zone phone for oncoming shift:   [phone]       Activity:  Activity Level: Up with Assistance  Number times ambulated in hallways past shift: 0  Number of times OOB to chair past shift: 0    Cardiac:   Cardiac Monitoring: Yes      Cardiac Rhythm: Paced    Access:   Current line(s): PIV     Genitourinary:   Urinary status: external catheter    Respiratory:   O2 Device: Room air  Chronic home O2 use?: NO  Incentive spirometer at bedside: NO     GI:  Last Bowel Movement Date: 01/03/21  Current diet:  DIET DIABETIC CONSISTENT CARB Regular  DIET NPO  Passing flatus: YES  Tolerating current diet: YES  % Diet Eaten: 75 %    Pain Management:   Patient states pain is manageable on current regimen: YES    Skin:  Preet Score: 16  Interventions: turn team, speciality bed, float heels, increase time out of bed and PT/OT consult    Patient Safety:  Fall Score:  Total Score: 2  Interventions: assistive device (walker, cane, etc), gripper socks and pt to call before getting OOB       Length of Stay:  Expected LOS: - - -  Actual LOS: [place] [address]

## 2021-01-03 NOTE — PROGRESS NOTES
ADULT PROTOCOL: JET AEROSOL  REASSESSMENT    Patient  Myer Gilford     78 y.o.   female     1/3/2021  9:28 AM    Breath Sounds Pre Procedure: Right Breath Sounds: Scattered wheezing                               Left Breath Sounds: Scattered wheezing    Breath Sounds Post Procedure: Right Breath Sounds: Diminished                                 Left Breath Sounds: Diminished    Breathing pattern: Pre procedure Breathing Pattern: Regular          Post procedure Breathing Pattern: Regular    Heart Rate: Pre procedure Pulse: 80           Post procedure Pulse: 79    Resp Rate: Pre procedure Respirations: 16           Post procedure Respirations: 16      Oxygen: O2 Device: Room air        Changed: no    SpO2: Pre procedure SpO2: 98 %                 Post procedure SpO2: 93 %      Nebulizer Therapy: Current medications Aerosolized Medications: DuoNeb, Brovana, Pulmicort      Changed: no        Problem List:   Patient Active Problem List   Diagnosis Code    Unspecified hereditary and idiopathic peripheral neuropathy G60.9    HBP (high blood pressure) I10    Spinal stenosis, lumbar region, without neurogenic claudication M48.061    Essential and other specified forms of tremor G25.0, G25.2    IBS (irritable bowel syndrome) K58.9    Depression F32.9    Migraine with aura, without mention of intractable migraine without mention of status migrainosus G43. 109    Right knee DJD M17.11    B12 deficiency E53.8    Ataxia R27.0    Foot pain, right Y85.794    Acute systolic heart failure (HCC) I50.21    Fever R50.9    UTI (urinary tract infection) N39.0    Lower abdominal pain R10.30    Type 2 diabetes mellitus with diabetic neuropathy, without long-term current use of insulin (HCC) E11.40    Chronic atrial fibrillation (HCC) X53.43    Systolic CHF, chronic (HCC) I50.22    Cellulitis of left lower leg L03. 116    Anxiety F41.9    Hypercholesterolemia E78.00    Long-term use of high-risk medication Z79.899    Hypokalemia E87.6    Ischemic cardiomyopathy I25.5    CAD (coronary artery disease) I25.10    Epigastric abdominal pain R10.13    S/P placement of cardiac pacemaker Z95.0    Stage 3 chronic kidney disease N18.30    Chronic cholecystitis K81.1    Asthma J45.909    Obesity (BMI 30-39. 9) E66.9    Acute asthma exacerbation J[address] extremity edema R60.0    Venous stasis dermatitis of both lower extremities I87.2    Ingrown toenail of left foot L60.0    Type 2 diabetes with nephropathy (HCC) E11.21    Respiratory failure (HCC) J96.90    Lower back pain M54.5    Hypoxia R09.02    Lethargy R53.83    Hypoglycemia E16.2       Respiratory Therapist: Arturo Laureano RT

## 2021-01-03 NOTE — PROGRESS NOTES
Hospitalist Progress Note    NAME: Preston Avalos   :  1950   MRN:  [id number]     Emely Foy is a 78 y.o. female with past medical history of CHF, SHAYLA on CPAP, spinal stenosis status, L1 fracture recent kyphoplasty, diabetes, hypertension was brought in by EMS for lethargy. As per the  she was less responsive than usual yesterday evening, for which they called EMS however she refused the transport. She was noted to have metabolic encephalopathy due to hypoglycemia. She was also noted to have a new T12 compression fracture. Assessment / Plan:  Acute metabolic encephalopathy due to hypoglycemia  DM on Glimeperide  CT head negative  CT spine shows post op changes  UA pending, CXR normal  Monitor blood sugars off of dextrose drip. Check blood sugars before meals at bedtime. Start low-dose insulin sliding scale. DC glimeperide at discharge since A1c is only 6.0  Her mental status significantly improved with correction of blood sugar so metabolic encephalopathy was most likely due to hypoglycemia  She is also on Klonopin at home and consider stopping it if confusion recurs    Diabetes mellitus type 2  -Recent A1c was 6.0. Discontinue glimepiride at the time of discharge  -Target A1c for her which should be around 7.0.       HTN:  C/w home metoprolol, lisinopril and Imdur     CKD3:   -Creatinine is close to baseline of around 1.6.     CHF:  Pulmonary HTN  CAD s/p PCI  S/p Biv ICD  Chronic Afib  C/w home meds bumex, pradaxa, lisinopril, metoprolol     Spinal stenosis  L1 fracture recent Kyphoplasty  -CT spine on  is now showing acute T12 compression fracture. Will consult interventional radiology.  -We will keep her n.p.o. from midnight.   Start low-dose dextrose starting from midnight while she remains n.p.o.  -Hold dabigatran in anticipation for procedure     Code Status: Full code  Surrogate Decision Maker:      DVT Prophylaxis: On Dabigatran  GI Prophylaxis: protonix     Baseline: Walks with walker      There is no height or weight on file to calculate BMI. Subjective:     Chief Complaint / Reason for Physician Visit  She is now alert awake and less confused  Blood sugars are better off of dextrose drip  No fever      Review of Systems:  Symptom Y/N Comments  Symptom Y/N Comments   Fever/Chills    Chest Pain     Poor Appetite    Edema     Cough    Abdominal Pain     Sputum    Joint Pain     SOB/STEEN    Pruritis/Rash     Nausea/vomit    Tolerating PT/OT     Diarrhea    Tolerating Diet     Constipation    Other       Could NOT obtain due to:      Objective:     VITALS:   Last 24hrs VS reviewed since prior progress note. Most recent are:  Patient Vitals for the past 24 hrs:   Temp Pulse Resp BP SpO2   01/03/21 0857 98.7 °F (37.1 °C)       01/03/21 0734 99.3 °F (37.4 °C) 81 16 124/61 99 %   01/03/21 0730     98 %   01/03/21 0400 97.7 °F (36.5 °C) 80 12 135/60 98 %   01/03/21 0000 98.1 °F (36.7 °C) 80 15 99/80 93 %   01/02/21 2042     99 %   01/02/21 1956 97.9 °F (36.6 °C) 80 15 122/68 97 %   01/02/21 1800 98.1 °F (36.7 °C) 80 16 134/64 96 %   01/02/21 1700 98.1 °F (36.7 °C) 80 14 118/64 98 %   01/02/21 1600 98 °F (36.7 °C) 80 19 (!) 115/56 99 %   01/02/21 1500 98 °F (36.7 °C) 80 20 116/62 96 %   01/02/21 1458     98 %   01/02/21 1400 98 °F (36.7 °C) 80 15 (!) 115/53 98 %   01/02/21 1300 98 °F (36.7 °C) 80 21 (!) 108/55 98 %   01/02/21 1200 98.2 °F (36.8 °C) 81 18 (!) 98/31 97 %   01/02/21 1100 98.2 °F (36.8 °C) 80 16 (!) 105/52 95 %       Intake/Output Summary (Last 24 hours) at 1/3/2021 1006  Last data filed at 1/3/2021 0800  Gross per 24 hour   Intake 722.5 ml   Output 2950 ml   Net -2227.5 ml        PHYSICAL EXAM:  General: cooperative, no acute distress    EENT:  EOMI. Anicteric sclerae. MMM  Resp:  CTA bilaterally, no wheezing or rales.   No accessory muscle use  CV:  Regular  rhythm,  No edema  GI:  Soft, Non distended, Non tender.  +Bowel sounds  Neurologic:  Alert and awake, normal speech,   Psych:    Not anxious nor agitated  Skin:  No rashes.   No jaundice    Reviewed most current lab test results and cultures  YES  Reviewed most current radiology test results   YES  Review and summation of old records today    NO  Reviewed patient's current orders and MAR    YES  PMH/SH reviewed - no change compared to H&P          Current Facility-Administered Medications:     insulin lispro (HUMALOG) injection, , SubCUTAneous, AC&HS, Codi Crane MD    glucose chewable tablet 16 g, 4 Tab, Oral, PRN, Brooksie Boxer, Amedeo Contes, MD    dextrose (D50W) injection syrg 12.5-25 g, 12.5-25 g, IntraVENous, PRN, Codi Crane MD    glucagon (GLUCAGEN) injection 1 mg, 1 mg, IntraMUSCular, PRN, Codi Crane MD    albuterol-ipratropium (DUO-NEB) 2.5 MG-0.5 MG/3 ML, 3 mL, Nebulization, Q6H PRN, Rc Pedroza MD    aspirin chewable tablet 81 mg, 81 mg, Oral, DAILY, Rc Pedroza MD, 81 mg at 01/03/21 0854    atorvastatin (LIPITOR) tablet 40 mg, 40 mg, Oral, QHS, Rc Pedroza MD, 40 mg at 01/02/21 2133    bumetanide (BUMEX) tablet 1 mg, 1 mg, Oral, DAILY, Rc Pedroza MD, 1 mg at 01/03/21 0854    calcium carbonate (OS-YEFRI) tablet 500 mg [elemental], 500 mg, Oral, DAILY, Rc Pedroza MD, 500 mg at 01/03/21 0854    clonazePAM (KlonoPIN) tablet 0.5 mg, 0.5 mg, Oral, DAILY, Rc Pedroza MD, 0.5 mg at 01/02/21 0916    cyclobenzaprine (FLEXERIL) tablet 10 mg, 10 mg, Oral, TID PRN, Rc Pedroza MD, 10 mg at 01/02/21 2133    dabigatran etexilate (PRADAXA) capsule 150 mg, 150 mg, Oral, BID, Rc Pedroza MD, 150 mg at 01/03/21 0856    hydrALAZINE (APRESOLINE) tablet 50 mg, 50 mg, Oral, BID, Rc Pedroza MD, 50 mg at 01/03/21 0855    isosorbide mononitrate ER (IMDUR) tablet 60 mg, 60 mg, Oral, DAILY, Rc Pedroza MD, 60 mg at 01/03/21 0854    lisinopriL (PRINIVIL, ZESTRIL) tablet 20 mg, 20 mg, Oral, DAILY, Krystle Luana Minaya MD, 20 mg at 01/03/21 0854    metoprolol succinate (TOPROL-XL) XL tablet 100 mg, 100 mg, Oral, DAILY, Kelton Hoyos MD, 100 mg at 01/03/21 0854    pantoprazole (PROTONIX) tablet 40 mg, 40 mg, Oral, ACB, Kelton Hoyos MD, 40 mg at 01/03/21 0855    pregabalin (LYRICA) capsule 300 mg, 300 mg, Oral, BID, Kelton Hoyos MD, 300 mg at 01/03/21 0854    arformoterol 15 mcg/budesonide 0.5 mg neb solution, , Nebulization, BID, Kelton Hoyos MD, Stopped at 01/03/21 0900    traMADoL (ULTRAM) tablet 50 mg, 50 mg, Oral, Q6H PRN, Kelton Hoyos MD, 50 mg at 01/01/21 2007    venlafaxine-SR (EFFEXOR-XR) capsule 150 mg, 150 mg, Oral, BID WITH MEALS, Kelton Hoyos MD, 150 mg at 01/03/21 0856    sodium chloride (NS) flush 5-40 mL, 5-40 mL, IntraVENous, Q8H, Kelton Hoyos MD, 10 mL at 01/03/21 0523    sodium chloride (NS) flush 5-40 mL, 5-40 mL, IntraVENous, PRN, Kelton Hoyos MD    acetaminophen (TYLENOL) tablet 650 mg, 650 mg, Oral, Q6H PRN **OR** acetaminophen (TYLENOL) suppository 650 mg, 650 mg, Rectal, Q6H PRN, Kelton Hoyos MD    polyethylene glycol (MIRALAX) packet 17 g, 17 g, Oral, DAILY PRN, Kelton Hoyos MD, 17 g at 01/02/21 2133    promethazine (PHENERGAN) tablet 12.5 mg, 12.5 mg, Oral, Q6H PRN **OR** ondansetron (ZOFRAN) injection 4 mg, 4 mg, IntraVENous, Q6H PRN, Karri Dalton MD    glucose chewable tablet 16 g, 4 Tab, Oral, PRN, Choco Beck ACNP    dextrose (D50W) injection syrg 12.5-25 g, 12.5-25 g, IntraVENous, PRN, Purveyor, Atoosa, ACNP, 25 g at 01/01/21 2114    glucagon (GLUCAGEN) injection 1 mg, 1 mg, IntraMUSCular, PRN, Purveyor, Atoosa, ACNP    albuterol-ipratropium (DUO-NEB) 2.5 MG-0.5 MG/3 ML, 3 mL, Nebulization, Q6H RT, Kelton Hoyos MD, 3 mL at 01/03/21 5516  ________________________________________________________________________  Care Plan discussed with:    Comments   Patient y    Family      RN y    Care Manager Consultant                        Multidiciplinary team rounds were held today with , nursing, pharmacist and clinical coordinator. Patient's plan of care was discussed; medications were reviewed and discharge planning was addressed. ________________________________________________________________________  Total NON critical care TIME:  35   Minutes    Total CRITICAL CARE TIME Spent:   Minutes non procedure based      Comments   >50% of visit spent in counseling and coordination of care     ________________________________________________________________________  Brittany Samuels MD     Procedures: see electronic medical records for all procedures/Xrays and details which were not copied into this note but were reviewed prior to creation of Plan. LABS:  I reviewed today's most current labs and imaging studies.   Pertinent labs include:  Recent Labs     01/03/21  0334 01/02/21  0430 01/01/21  1018   WBC 5.3 7.9 7.4   HGB 8.1* 9.1* 9.0*   HCT 28.3* 33.5* 31.7*    199 237     Recent Labs     01/03/21  0334 01/02/21  0430 01/01/21  1018    139 141   K 3.8 4.1 3.7   * 111* 112*   CO2 24 21 26   * 86 248*   BUN 32* 29* 39*   CREA 1.66* 1.41* 1.58*   CA 8.5 8.8 8.4*   MG  --   --  1.8   ALB  --   --  2.4*   TBILI  --   --  0.4   ALT  --   --  18       Signed: Brittany Samuels MD

## 2021-01-03 NOTE — PROGRESS NOTES
Hospitalist Progress Note    NAME: Otf Ingram   :  1950   MRN:  [id number]       Assessment / Plan: Altered mental status likely d/t hypoglycemia:  DM on Glimeperide  CT head negative  CT spine shows post op changes  UA pending, CXR normal  Stop dextrose drip and monitor sugars  DC glimeperide at discharge   Her mental status has improved compared to this morning, likely is related to her hypoglycemia.       HTN:  C/w home meds     CKD3:   Creatinine stable will monitor     CHF:  Pulmonary HTN  CAD s/p PCI  S/p Biv ICD  Chronic Afib  C/w home meds bumex, pradaxa     Spinal stenosis  L1 fracture recent Kyphoplasty       Code Status: Full code  Surrogate Decision Maker:      DVT Prophylaxis: On Dabigatran  GI Prophylaxis: protonix     Baseline: Walks with walker      There is no height or weight on file to calculate BMI. Subjective:     Chief Complaint / Reason for Physician Visit  Mental status is better today. Review of Systems:  Symptom Y/N Comments  Symptom Y/N Comments   Fever/Chills    Chest Pain     Poor Appetite    Edema     Cough    Abdominal Pain     Sputum    Joint Pain     SOB/STEEN    Pruritis/Rash     Nausea/vomit    Tolerating PT/OT     Diarrhea    Tolerating Diet     Constipation    Other       Could NOT obtain due to:      Objective:     VITALS:   Last 24hrs VS reviewed since prior progress note.  Most recent are:  Patient Vitals for the past 24 hrs:   Temp Pulse Resp BP SpO2   21 1800 98.1 °F (36.7 °C) 80 16 134/64 96 %   21 1700 98.1 °F (36.7 °C) 80 14 118/64 98 %   21 1600 98 °F (36.7 °C) 80 19 (!) 115/56 99 %   21 1500 98 °F (36.7 °C) 80 20 116/62 96 %   21 1458     98 %   21 1400 98 °F (36.7 °C) 80 15 (!) 115/53 98 %   21 1300 98 °F (36.7 °C) 80 21 (!) 108/55 98 %   21 1200 98.2 °F (36.8 °C) 81 18 (!) 98/31 97 %   21 1100 98.2 °F (36.8 °C) 80 16 (!) 105/52 95 %   21 1000 98.2 °F (36.8 °C) 80 20 (!) 106/30 100 %   01/02/21 0900 98.2 °F (36.8 °C) 80 17 (!) 108/52 (!) 85 %   01/02/21 0800 98.2 °F (36.8 °C) 80 13 (!) 115/57 95 %   01/02/21 0742     97 %   01/02/21 0700 98.2 °F (36.8 °C) 88 20 (!) 164/69 (!) 87 %   01/02/21 0600  80 14 (!) 119/54 95 %   01/02/21 0500  80 18  97 %   01/02/21 0400 98.4 °F (36.9 °C) 80 17 (!) 145/64 97 %   01/02/21 0300  80 18 127/83 100 %   01/02/21 0200  80 14 (!) 146/71 100 %   01/02/21 0155     100 %   01/02/21 0100  80 17 (!) 144/79 100 %   01/02/21 0000  80 17 (!) 152/73 98 %   01/01/21 2321 97.9 °F (36.6 °C) 80 20 138/71 98 %   01/01/21 2300  80 19  100 %   01/01/21 2200  86 18  100 %   01/01/21 2100  80 21  100 %   01/01/21 2050     100 %       Intake/Output Summary (Last 24 hours) at 1/2/2021 2016  Last data filed at 1/2/2021 1600  Gross per 24 hour   Intake 599.59 ml   Output 2275 ml   Net -1675.41 ml        PHYSICAL EXAM:  General: WD, WN. Alert, cooperative, no acute distress    EENT:  EOMI. Anicteric sclerae. MMM  Resp:  CTA bilaterally, no wheezing or rales. No accessory muscle use  CV:  Regular  rhythm,  No edema  GI:  Soft, Non distended, Non tender.  +Bowel sounds  Neurologic:  Alert and awake, normal speech,   Psych:    Not anxious nor agitated  Skin:  No rashes.   No jaundice    Reviewed most current lab test results and cultures  YES  Reviewed most current radiology test results   YES  Review and summation of old records today    NO  Reviewed patient's current orders and MAR    YES  PMH/SH reviewed - no change compared to H&P          Current Facility-Administered Medications:     dextrose 5% infusion, 75 mL/hr, IntraVENous, CONTINUOUS, Rosa Maria, Pixie Sonia, MD, Stopped at 01/02/21 1600    albuterol-ipratropium (DUO-NEB) 2.5 MG-0.5 MG/3 ML, 3 mL, Nebulization, Q6H PRN, Carmel Suazo MD    aspirin chewable tablet 81 mg, 81 mg, Oral, DAILY, Carmel Suazo MD, 81 mg at 01/02/21 0917    atorvastatin (LIPITOR) tablet 40 mg, 40 mg, Oral, Genoveva Flaherty MD, 40 mg at 01/01/21 2147    bumetanide (BUMEX) tablet 1 mg, 1 mg, Oral, DAILY, Waylon Oconnor MD, 1 mg at 01/02/21 0917    calcium carbonate (OS-YEFRI) tablet 500 mg [elemental], 500 mg, Oral, DAILY, Waylon Oconnor MD, 500 mg at 01/02/21 6892    clonazePAM (KlonoPIN) tablet 0.5 mg, 0.5 mg, Oral, DAILY, Waylon Oconnor MD, 0.5 mg at 01/02/21 0916    cyclobenzaprine (FLEXERIL) tablet 10 mg, 10 mg, Oral, TID PRN, Waylon Oconnor MD, 10 mg at 01/01/21 2007    dabigatran etexilate (PRADAXA) capsule 150 mg, 150 mg, Oral, BID, Waylon Oconnor MD, 150 mg at 01/02/21 1731    hydrALAZINE (APRESOLINE) tablet 50 mg, 50 mg, Oral, BID, Waylon Oconnor MD, 50 mg at 01/02/21 1731    isosorbide mononitrate ER (IMDUR) tablet 60 mg, 60 mg, Oral, DAILY, Waylon Oconnor MD, 60 mg at 01/02/21 0916    lisinopriL (PRINIVIL, ZESTRIL) tablet 20 mg, 20 mg, Oral, DAILY, Waylon Oconnor MD, 20 mg at 01/02/21 0917    metoprolol succinate (TOPROL-XL) XL tablet 100 mg, 100 mg, Oral, DAILY, Waylon Oconnor MD, 100 mg at 01/02/21 0916    pantoprazole (PROTONIX) tablet 40 mg, 40 mg, Oral, ACB, Waylon Oconnor MD, 40 mg at 01/02/21 0916    pregabalin (LYRICA) capsule 300 mg, 300 mg, Oral, BID, Waylon Oconnor MD, 300 mg at 01/02/21 1731    arformoterol 15 mcg/budesonide 0.5 mg neb solution, , Nebulization, BID, Waylon Oconnor MD, Stopped at 01/02/21 0900    traMADoL (ULTRAM) tablet 50 mg, 50 mg, Oral, Q6H PRN, Waylon Oconnor MD, 50 mg at 01/01/21 2007    venlafaxine-SR (EFFEXOR-XR) capsule 150 mg, 150 mg, Oral, BID WITH MEALS, Waylon Oconnor MD, 150 mg at 01/02/21 1731    sodium chloride (NS) flush 5-40 mL, 5-40 mL, IntraVENous, Q8H, Karri Dalton MD, 20 mL at 01/02/21 1400    sodium chloride (NS) flush 5-40 mL, 5-40 mL, IntraVENous, PRN, Waylon Oconnor MD    acetaminophen (TYLENOL) tablet 650 mg, 650 mg, Oral, Q6H PRN **OR** acetaminophen (TYLENOL) suppository 650 mg, 650 mg, Rectal, Q6H PRN, Ariel Reyez MD    polyethylene glycol (MIRALAX) packet 17 g, 17 g, Oral, DAILY PRN, Ariel Reyez MD    promethazine (PHENERGAN) tablet 12.5 mg, 12.5 mg, Oral, Q6H PRN **OR** ondansetron (ZOFRAN) injection 4 mg, 4 mg, IntraVENous, Q6H PRN, Karri Dalton MD    glucose chewable tablet 16 g, 4 Tab, Oral, PRN, Purveyor, Atoosa, ACNP    dextrose (D50W) injection syrg 12.5-25 g, 12.5-25 g, IntraVENous, PRN, Purveyor, Atoosa, ACNP, 25 g at 01/01/21 2114    glucagon (GLUCAGEN) injection 1 mg, 1 mg, IntraMUSCular, PRN, Purveyor, Atoosa, ACNP    albuterol-ipratropium (DUO-NEB) 2.5 MG-0.5 MG/3 ML, 3 mL, Nebulization, Q6H RT, Ariel Reyez MD, 3 mL at 01/02/21 1448  ________________________________________________________________________  Care Plan discussed with:    Comments   Patient y    Family      RN y    Care Manager     Consultant                        Multidiciplinary team rounds were held today with , nursing, pharmacist and clinical coordinator. Patient's plan of care was discussed; medications were reviewed and discharge planning was addressed. ________________________________________________________________________  Total NON critical care TIME:  35   Minutes    Total CRITICAL CARE TIME Spent:   Minutes non procedure based      Comments   >50% of visit spent in counseling and coordination of care     ________________________________________________________________________  Ronit Mao MD     Procedures: see electronic medical records for all procedures/Xrays and details which were not copied into this note but were reviewed prior to creation of Plan. LABS:  I reviewed today's most current labs and imaging studies.   Pertinent labs include:  Recent Labs     01/02/21  0430 01/01/21  1018   WBC 7.9 7.4   HGB 9.1* 9.0*   HCT 33.5* 31.7*    237     Recent Labs     01/02/21  0430 01/01/21  1018    141   K 4.1 3.7 * 112*   CO2 21 26   GLU 86 248*   BUN 29* 39*   CREA 1.41* 1.58*   CA 8.8 8.4*   MG  --  1.8   ALB  --  2.4*   TBILI  --  0.4   ALT  --  18       Signed: Ira Maria MD

## 2021-01-04 LAB
ANION GAP SERPL CALC-SCNC: 4 MMOL/L (ref 5–15)
BUN SERPL-MCNC: 36 MG/DL (ref 6–20)
BUN/CREAT SERPL: 23 (ref 12–20)
CALCIUM SERPL-MCNC: 8.7 MG/DL (ref 8.5–10.1)
CHLORIDE SERPL-SCNC: 113 MMOL/L (ref 97–108)
CO2 SERPL-SCNC: 28 MMOL/L (ref 21–32)
CREAT SERPL-MCNC: 1.57 MG/DL (ref 0.55–1.02)
ERYTHROCYTE [DISTWIDTH] IN BLOOD BY AUTOMATED COUNT: 18.4 % (ref 11.5–14.5)
GLUCOSE BLD STRIP.AUTO-MCNC: 128 MG/DL (ref 65–100)
GLUCOSE BLD STRIP.AUTO-MCNC: 177 MG/DL (ref 65–100)
GLUCOSE BLD STRIP.AUTO-MCNC: 178 MG/DL (ref 65–100)
GLUCOSE BLD STRIP.AUTO-MCNC: 195 MG/DL (ref 65–100)
GLUCOSE BLD STRIP.AUTO-MCNC: 205 MG/DL (ref 65–100)
GLUCOSE SERPL-MCNC: 126 MG/DL (ref 65–100)
HCT VFR BLD AUTO: 28.3 % (ref 35–47)
HGB BLD-MCNC: 8.2 G/DL (ref 11.5–16)
MCH RBC QN AUTO: 23.8 PG (ref 26–34)
MCHC RBC AUTO-ENTMCNC: 29 G/DL (ref 30–36.5)
MCV RBC AUTO: 82 FL (ref 80–99)
NRBC # BLD: 0 K/UL (ref 0–0.01)
NRBC BLD-RTO: 0 PER 100 WBC
PLATELET # BLD AUTO: 178 K/UL (ref 150–400)
PMV BLD AUTO: 12.2 FL (ref 8.9–12.9)
POTASSIUM SERPL-SCNC: 3.4 MMOL/L (ref 3.5–5.1)
RBC # BLD AUTO: 3.45 M/UL (ref 3.8–5.2)
SERVICE CMNT-IMP: ABNORMAL
SODIUM SERPL-SCNC: 145 MMOL/L (ref 136–145)
WBC # BLD AUTO: 5.4 K/UL (ref 3.6–11)

## 2021-01-04 PROCEDURE — 36415 COLL VENOUS BLD VENIPUNCTURE: CPT

## 2021-01-04 PROCEDURE — 94640 AIRWAY INHALATION TREATMENT: CPT

## 2021-01-04 PROCEDURE — 74011250636 HC RX REV CODE- 250/636: Performed by: INTERNAL MEDICINE

## 2021-01-04 PROCEDURE — 65660000000 HC RM CCU STEPDOWN

## 2021-01-04 PROCEDURE — 82962 GLUCOSE BLOOD TEST: CPT

## 2021-01-04 PROCEDURE — 74011250637 HC RX REV CODE- 250/637: Performed by: STUDENT IN AN ORGANIZED HEALTH CARE EDUCATION/TRAINING PROGRAM

## 2021-01-04 PROCEDURE — 85027 COMPLETE CBC AUTOMATED: CPT

## 2021-01-04 PROCEDURE — 74011250637 HC RX REV CODE- 250/637: Performed by: INTERNAL MEDICINE

## 2021-01-04 PROCEDURE — 74011636637 HC RX REV CODE- 636/637: Performed by: INTERNAL MEDICINE

## 2021-01-04 PROCEDURE — 80048 BASIC METABOLIC PNL TOTAL CA: CPT

## 2021-01-04 PROCEDURE — 74011000250 HC RX REV CODE- 250: Performed by: STUDENT IN AN ORGANIZED HEALTH CARE EDUCATION/TRAINING PROGRAM

## 2021-01-04 PROCEDURE — 74011250637 HC RX REV CODE- 250/637: Performed by: HOSPITALIST

## 2021-01-04 RX ORDER — POTASSIUM CHLORIDE 750 MG/1
40 TABLET, FILM COATED, EXTENDED RELEASE ORAL ONCE
Status: COMPLETED | OUTPATIENT
Start: 2021-01-04 | End: 2021-01-04

## 2021-01-04 RX ADMIN — IPRATROPIUM BROMIDE AND ALBUTEROL SULFATE 3 ML: .5; 3 SOLUTION RESPIRATORY (INHALATION) at 20:02

## 2021-01-04 RX ADMIN — Medication 10 ML: at 13:12

## 2021-01-04 RX ADMIN — ASPIRIN 81 MG: 81 TABLET, CHEWABLE ORAL at 09:06

## 2021-01-04 RX ADMIN — HYDRALAZINE HYDROCHLORIDE 50 MG: 50 TABLET, FILM COATED ORAL at 09:06

## 2021-01-04 RX ADMIN — PREGABALIN 300 MG: 100 CAPSULE ORAL at 09:06

## 2021-01-04 RX ADMIN — ARFORMOTEROL TARTRATE: 15 SOLUTION RESPIRATORY (INHALATION) at 20:07

## 2021-01-04 RX ADMIN — VENLAFAXINE HYDROCHLORIDE 150 MG: 150 CAPSULE, EXTENDED RELEASE ORAL at 17:45

## 2021-01-04 RX ADMIN — PREGABALIN 300 MG: 100 CAPSULE ORAL at 17:45

## 2021-01-04 RX ADMIN — CLONAZEPAM 0.5 MG: 1 TABLET ORAL at 22:24

## 2021-01-04 RX ADMIN — DEXTROSE MONOHYDRATE 25 ML/HR: 5 INJECTION, SOLUTION INTRAVENOUS at 00:01

## 2021-01-04 RX ADMIN — DOCUSATE SODIUM - SENNOSIDES 1 TABLET: 50; 8.6 TABLET, FILM COATED ORAL at 09:06

## 2021-01-04 RX ADMIN — POTASSIUM CHLORIDE 40 MEQ: 750 TABLET, FILM COATED, EXTENDED RELEASE ORAL at 11:03

## 2021-01-04 RX ADMIN — Medication 10 ML: at 22:00

## 2021-01-04 RX ADMIN — BUMETANIDE 1 MG: 1 TABLET ORAL at 09:06

## 2021-01-04 RX ADMIN — LISINOPRIL 20 MG: 20 TABLET ORAL at 09:06

## 2021-01-04 RX ADMIN — ISOSORBIDE MONONITRATE 60 MG: 30 TABLET, EXTENDED RELEASE ORAL at 09:06

## 2021-01-04 RX ADMIN — CALCIUM 500 MG: 500 TABLET ORAL at 09:06

## 2021-01-04 RX ADMIN — IPRATROPIUM BROMIDE AND ALBUTEROL SULFATE 3 ML: .5; 3 SOLUTION RESPIRATORY (INHALATION) at 02:50

## 2021-01-04 RX ADMIN — HYDRALAZINE HYDROCHLORIDE 50 MG: 50 TABLET, FILM COATED ORAL at 17:45

## 2021-01-04 RX ADMIN — IPRATROPIUM BROMIDE AND ALBUTEROL SULFATE 3 ML: .5; 3 SOLUTION RESPIRATORY (INHALATION) at 13:52

## 2021-01-04 RX ADMIN — PANTOPRAZOLE SODIUM 40 MG: 40 TABLET, DELAYED RELEASE ORAL at 09:06

## 2021-01-04 RX ADMIN — DOCUSATE SODIUM - SENNOSIDES 1 TABLET: 50; 8.6 TABLET, FILM COATED ORAL at 17:45

## 2021-01-04 RX ADMIN — ARFORMOTEROL TARTRATE: 15 SOLUTION RESPIRATORY (INHALATION) at 07:48

## 2021-01-04 RX ADMIN — METOPROLOL SUCCINATE 100 MG: 50 TABLET, EXTENDED RELEASE ORAL at 09:06

## 2021-01-04 RX ADMIN — INSULIN LISPRO 2 UNITS: 100 INJECTION, SOLUTION INTRAVENOUS; SUBCUTANEOUS at 12:02

## 2021-01-04 RX ADMIN — IPRATROPIUM BROMIDE AND ALBUTEROL SULFATE 3 ML: .5; 3 SOLUTION RESPIRATORY (INHALATION) at 07:48

## 2021-01-04 RX ADMIN — VENLAFAXINE HYDROCHLORIDE 150 MG: 150 CAPSULE, EXTENDED RELEASE ORAL at 09:06

## 2021-01-04 RX ADMIN — ATORVASTATIN CALCIUM 40 MG: 40 TABLET, FILM COATED ORAL at 22:24

## 2021-01-04 NOTE — WOUND CARE
Wound care consult for LLE cellulitis that was present on admission. Chart reviewed and patient assessed for her legs. The patient can move her legs and she does have chronic venous stasis mild dermatitis several times per year. Assessment: Both legs with mildly scaly skin. The cellulitis is resolving well. On admission the patient had bright red dermatitis but now the skin is pale pink and intact. Treatment: Instructed the patient care tech to apply the Hydroguard cream to the skin when her bath is completed for the day. Float the heels. Plan: Orders written for the same.  
Yamila Hinton RN, BSN, [employer]

## 2021-01-04 NOTE — PROGRESS NOTES
1900:Bedside shift change report given to Primitivo bruno RN  (oncoming nurse) by Nerissa Hendricks RN  (offgoing nurse). Report included the following information SBAR, Kardex, [facility] and Cardiac Rhythm Paced . 0700 End of Shift Note    Bedside shift change report given  by Kya Littlejohn (offgoing nurse). Report included the following information SBAR, Kardex, Intake/Output, MAR and Cardiac Rhythm paced     Shift worked:  2813-0002     Shift summary and any significant changes:     NPO and D5% started at midnight. New IV placed( see LDA)      Concerns for physician to address:  None at this time. Zone phone for oncoming shift:          Activity:  Activity Level: Up with Assistance  Number times ambulated in hallways past shift: 0  Number of times OOB to chair past shift: 0    Cardiac:   Cardiac Monitoring: Yes      Cardiac Rhythm: Paced    Access:   Current line(s): PIV     Genitourinary:   Urinary status: voiding    Respiratory:   O2 Device: Nasal cannula  Chronic home O2 use?: NO  Incentive spirometer at bedside: NO     GI:  Last Bowel Movement Date: 01/03/21  Current diet:  DIET NPO  Passing flatus: YES  Tolerating current diet: YES  % Diet Eaten: 75 %    Pain Management:   Patient states pain is manageable on current regimen: YES    Skin:  Preet Score: 16  Interventions: turn team, increase time out of bed and PT/OT consult    Patient Safety:  Fall Score: Total Score: 2  Interventions: bed/chair alarm and pt to call before getting OOB       Length of Stay:  Expected LOS: - - -  Actual LOS: 3    Pt turns well, with one assist. Tolerated activity well.      Kya Littlejohn

## 2021-01-04 NOTE — PROGRESS NOTES
Hospitalist Progress Note    NAME: Dominga Block   :  1950   MRN:  [id number]          Assessment / Plan:  Acute metabolic encephalopathy due to hypoglycemia resolved  DM ty 2 on Glimeperide  CT head negative  CT spine shows post op changes  UA neg , CXR normal  off of dextrose drip.    low-dose insulin sliding scale. DC glimeperide  As A1c is only 6.0     HTN: C/w home metoprolol, lisinopril and Imdur     CKD3:  -Creatinine is close to baseline of around 1.5     CHF:  Pulmonary HTN  CAD s/p PCI  S/p Biv ICD  Chronic Afib- hold pradaxa  Chr anemia  C/w home meds bumex,  lisinopril, metoprolol  replete K     Spinal stenosis  L1 fracture recent Kyphoplasty  -  T12 compression fracture. -  interventional radiology on case   -Hold dabigatran in anticipation for procedure     Code Status: Full code  Surrogate Decision Maker:    DVT Prophylaxis: On Dabigatran/SCD     Baseline: Walks with walker    Dispo: PT eval             Subjective:     Chief Complaint / Reason for Physician Visit f/u for hypoglycemia   She is AAO X3 Bg stable.  at bedside, IR to see for kyphoplasty      Objective:     VITALS:   Last 24hrs VS reviewed since prior progress note.  Most recent are:  Patient Vitals for the past 24 hrs:   Temp Pulse Resp BP SpO2   21 0800  80 13 (!) 144/76 99 %   21 0748     98 %   21 0400 98.2 °F (36.8 °C) 80 14 (!) 152/72 (!) 86 %   21 0249     92 %   21 2254 98 °F (36.7 °C) 81 14 (!) 140/63 100 %   21 2054     95 %   21 1916 98.3 °F (36.8 °C) 80 22 (!) 119/57 94 %   21 1740  80  (!) 115/55    21 1445 98.1 °F (36.7 °C) 85 18 (!) 104/58 95 %   21 1342     95 %   21 1120 98.7 °F (37.1 °C) 84 16 (!) 104/59 96 %   21 1044  86 20 112/60 97 %       Intake/Output Summary (Last 24 hours) at 2021 0954  Last data filed at 2021 0400  Gross per 24 hour   Intake 489.58 ml   Output 1050 ml   Net -560.42 ml PHYSICAL EXAM:  General: cooperative, no acute distress    Resp:  CTA bilaterally, no wheezing or rales. No accessory muscle use  CV:  Regular  rhythm,  No edema  GI:  Soft,   Non tender. Obese  Neurologic:  Alert and awake, normal speech,   Psych:    Not anxious nor agitated  Skin:  No rashes.   No jaundice    Reviewed most current lab test results and cultures  YES  Reviewed most current radiology test results   YES  Review and summation of old records today    NO  Reviewed patient's current orders and MAR    YES  PMH/SH reviewed - no change compared to H&P          Current Facility-Administered Medications:     insulin lispro (HUMALOG) injection, , SubCUTAneous, AC&HS, Suzie Montano MD, Stopped at 01/03/21 1130    glucose chewable tablet 16 g, 4 Tab, Oral, PRN, Wilder Rodriguez MD    dextrose (D50W) injection syrg 12.5-25 g, 12.5-25 g, IntraVENous, PRN, Wilder Crane MD    glucagon (GLUCAGEN) injection 1 mg, 1 mg, IntraMUSCular, PRN, Wilder Rodriguez MD    dextrose 5% infusion, 25 mL/hr, IntraVENous, CONTINUOUS, Marcelo Crane MD, Last Rate: 25 mL/hr at 01/04/21 0001, 25 mL/hr at 01/04/21 0001    clonazePAM (KlonoPIN) tablet 0.5 mg, 0.5 mg, Oral, QHS, John Bosch MD, 0.5 mg at 01/03/21 2209    senna-docusate (PERICOLACE) 8.6-50 mg per tablet 1 Tab, 1 Tab, Oral, BID, Suzie Montano MD, 1 Tab at 01/04/21 0906    albuterol-ipratropium (DUO-NEB) 2.5 MG-0.5 MG/3 ML, 3 mL, Nebulization, Q6H PRN, John Bosch MD    aspirin chewable tablet 81 mg, 81 mg, Oral, DAILY, John Bosch MD, 81 mg at 01/04/21 0906    atorvastatin (LIPITOR) tablet 40 mg, 40 mg, Oral, QHS, John Bosch MD, 40 mg at 01/03/21 2209    bumetanide (BUMEX) tablet 1 mg, 1 mg, Oral, DAILY, John Bosch MD, 1 mg at 01/04/21 0906    calcium carbonate (OS-YEFRI) tablet 500 mg [elemental], 500 mg, Oral, DAILY, John Bosch MD, 500 mg at 01/04/21 0906    cyclobenzaprine (FLEXERIL) tablet 10 mg, 10 mg, Oral, TID PRN, Hanley Essex, MD, 10 mg at 01/02/21 2133    [Held by provider] dabigatran etexilate (PRADAXA) capsule 150 mg, 150 mg, Oral, BID, Hanley Essex, MD, 150 mg at 01/03/21 0856    hydrALAZINE (APRESOLINE) tablet 50 mg, 50 mg, Oral, BID, Hanley Essex, MD, 50 mg at 01/04/21 0906    isosorbide mononitrate ER (IMDUR) tablet 60 mg, 60 mg, Oral, DAILY, Hanley Essex, MD, 60 mg at 01/04/21 0906    lisinopriL (PRINIVIL, ZESTRIL) tablet 20 mg, 20 mg, Oral, DAILY, Hanley Essex, MD, 20 mg at 01/04/21 0906    metoprolol succinate (TOPROL-XL) XL tablet 100 mg, 100 mg, Oral, DAILY, Hanley Essex, MD, 100 mg at 01/04/21 0906    pantoprazole (PROTONIX) tablet 40 mg, 40 mg, Oral, ACB, Hanley Essex, MD, 40 mg at 01/04/21 0906    pregabalin (LYRICA) capsule 300 mg, 300 mg, Oral, BID, Hanley Essex, MD, 300 mg at 01/04/21 0906    arformoterol 15 mcg/budesonide 0.5 mg neb solution, , Nebulization, BID, Hanley Essex, MD, Given at 01/04/21 0748    traMADoL (ULTRAM) tablet 50 mg, 50 mg, Oral, Q6H PRN, Hanley Essex, MD, 50 mg at 01/01/21 2007    venlafaxine-SR (EFFEXOR-XR) capsule 150 mg, 150 mg, Oral, BID WITH MEALS, Hanley Essex, MD, 150 mg at 01/04/21 0906    sodium chloride (NS) flush 5-40 mL, 5-40 mL, IntraVENous, Q8H, Hanley Essex, MD, 10 mL at 01/03/21 2209    sodium chloride (NS) flush 5-40 mL, 5-40 mL, IntraVENous, PRN, Hanley Essex, MD    acetaminophen (TYLENOL) tablet 650 mg, 650 mg, Oral, Q6H PRN **OR** acetaminophen (TYLENOL) suppository 650 mg, 650 mg, Rectal, Q6H PRN, Hanley Essex, MD    polyethylene glycol (MIRALAX) packet 17 g, 17 g, Oral, DAILY PRN, Hanley Essex, MD, 17 g at 01/02/21 3063    promethazine (PHENERGAN) tablet 12.5 mg, 12.5 mg, Oral, Q6H PRN **OR** ondansetron (ZOFRAN) injection 4 mg, 4 mg, IntraVENous, Q6H PRN, Karri Dalton MD    albuterol-ipratropium (DUO-NEB) 2.5 MG-0.5 MG/3 ML, 3 mL, Nebulization, Q6H RT, Nicole Collins MD, 3 mL at 01/04/21 9104  ________________________________________________________________________      Procedures: see electronic medical records for all procedures/Xrays and details which were not copied into this note but were reviewed prior to creation of Plan. LABS:  I reviewed today's most current labs and imaging studies.   Pertinent labs include:  Recent Labs     01/04/21 0132 01/03/21 0334 01/02/21  0430   WBC 5.4 5.3 7.9   HGB 8.2* 8.1* 9.1*   HCT 28.3* 28.3* 33.5*    187 199     Recent Labs     01/04/21 0132 01/03/21 0334 01/02/21  0430 01/01/21  1018    143 139 141   K 3.4* 3.8 4.1 3.7   * 112* 111* 112*   CO2 28 24 21 26   * 155* 86 248*   BUN 36* 32* 29* 39*   CREA 1.57* 1.66* 1.41* 1.58*   CA 8.7 8.5 8.8 8.4*   MG  --   --   --  1.8   ALB  --   --   --  2.4*   TBILI  --   --   --  0.4   ALT  --   --   --  18       Signed: Sola Conley MD

## 2021-01-04 NOTE — PROGRESS NOTES
End of Shift Note    Bedside shift change report given to Korina Ardon RN (oncoming nurse) by Carmen Hopper RN (offgoing nurse). Report included the following information SBAR, Kardex, Intake/Output and MAR    Shift worked:  day     Shift summary and any significant changes:     new wound care orders for lower leg cellulitis     Concerns for physician to address: Back surgery    Zone phone for oncoming shift:         Activity:  Activity Level: Up with Assistance  Number times ambulated in hallways past shift: 0  Number of times OOB to chair past shift: 0    Cardiac:   Cardiac Monitoring: Yes      Cardiac Rhythm: Paced    Access:   Current line(s): PIV     Genitourinary:   Urinary status: voiding    Respiratory:   O2 Device: Room air  Chronic home O2 use?: NO  Incentive spirometer at bedside: NO     GI:  Last Bowel Movement Date: 01/03/21  Current diet:  DIET DIABETIC CONSISTENT CARB Regular  Passing flatus: YES  Tolerating current diet: YES  % Diet Eaten: 75 %    Pain Management:   Patient states pain is manageable on current regimen: YES    Skin:  Preet Score: 16  Interventions: float heels, increase time out of bed, foam dressing, PT/OT consult and internal/external urinary devices    Patient Safety:  Fall Score:  Total Score: 2  Interventions: bed/chair alarm, assistive device (walker, cane, etc), gripper socks and pt to call before getting OOB       Length of Stay:  Expected LOS: 3d 19h  Actual LOS: [facility]

## 2021-01-05 LAB
GLUCOSE BLD STRIP.AUTO-MCNC: 119 MG/DL (ref 65–100)
GLUCOSE BLD STRIP.AUTO-MCNC: 160 MG/DL (ref 65–100)
GLUCOSE BLD STRIP.AUTO-MCNC: 182 MG/DL (ref 65–100)
GLUCOSE BLD STRIP.AUTO-MCNC: 187 MG/DL (ref 65–100)
SERVICE CMNT-IMP: ABNORMAL

## 2021-01-05 PROCEDURE — 94640 AIRWAY INHALATION TREATMENT: CPT

## 2021-01-05 PROCEDURE — 74011250636 HC RX REV CODE- 250/636: Performed by: INTERNAL MEDICINE

## 2021-01-05 PROCEDURE — 65660000000 HC RM CCU STEPDOWN

## 2021-01-05 PROCEDURE — 74011250637 HC RX REV CODE- 250/637: Performed by: STUDENT IN AN ORGANIZED HEALTH CARE EDUCATION/TRAINING PROGRAM

## 2021-01-05 PROCEDURE — 74011000250 HC RX REV CODE- 250: Performed by: STUDENT IN AN ORGANIZED HEALTH CARE EDUCATION/TRAINING PROGRAM

## 2021-01-05 PROCEDURE — 82962 GLUCOSE BLOOD TEST: CPT

## 2021-01-05 RX ADMIN — ASPIRIN 81 MG: 81 TABLET, CHEWABLE ORAL at 08:42

## 2021-01-05 RX ADMIN — ARFORMOTEROL TARTRATE: 15 SOLUTION RESPIRATORY (INHALATION) at 08:23

## 2021-01-05 RX ADMIN — VENLAFAXINE HYDROCHLORIDE 150 MG: 150 CAPSULE, EXTENDED RELEASE ORAL at 18:41

## 2021-01-05 RX ADMIN — ATORVASTATIN CALCIUM 40 MG: 40 TABLET, FILM COATED ORAL at 21:46

## 2021-01-05 RX ADMIN — ISOSORBIDE MONONITRATE 60 MG: 30 TABLET, EXTENDED RELEASE ORAL at 08:42

## 2021-01-05 RX ADMIN — Medication 10 ML: at 14:22

## 2021-01-05 RX ADMIN — CLONAZEPAM 0.5 MG: 1 TABLET ORAL at 21:46

## 2021-01-05 RX ADMIN — Medication 10 ML: at 21:46

## 2021-01-05 RX ADMIN — IPRATROPIUM BROMIDE AND ALBUTEROL SULFATE 3 ML: .5; 3 SOLUTION RESPIRATORY (INHALATION) at 08:24

## 2021-01-05 RX ADMIN — BUMETANIDE 1 MG: 1 TABLET ORAL at 08:42

## 2021-01-05 RX ADMIN — METOPROLOL SUCCINATE 100 MG: 50 TABLET, EXTENDED RELEASE ORAL at 08:42

## 2021-01-05 RX ADMIN — DEXTROSE MONOHYDRATE 25 ML/HR: 5 INJECTION, SOLUTION INTRAVENOUS at 03:25

## 2021-01-05 RX ADMIN — IPRATROPIUM BROMIDE AND ALBUTEROL SULFATE 3 ML: .5; 3 SOLUTION RESPIRATORY (INHALATION) at 19:39

## 2021-01-05 RX ADMIN — ARFORMOTEROL TARTRATE: 15 SOLUTION RESPIRATORY (INHALATION) at 19:45

## 2021-01-05 RX ADMIN — HYDRALAZINE HYDROCHLORIDE 50 MG: 50 TABLET, FILM COATED ORAL at 18:41

## 2021-01-05 RX ADMIN — IPRATROPIUM BROMIDE AND ALBUTEROL SULFATE 3 ML: .5; 3 SOLUTION RESPIRATORY (INHALATION) at 02:36

## 2021-01-05 RX ADMIN — PREGABALIN 300 MG: 100 CAPSULE ORAL at 18:41

## 2021-01-05 RX ADMIN — Medication 10 ML: at 06:00

## 2021-01-05 RX ADMIN — IPRATROPIUM BROMIDE AND ALBUTEROL SULFATE 3 ML: .5; 3 SOLUTION RESPIRATORY (INHALATION) at 13:52

## 2021-01-05 RX ADMIN — PANTOPRAZOLE SODIUM 40 MG: 40 TABLET, DELAYED RELEASE ORAL at 08:43

## 2021-01-05 RX ADMIN — CALCIUM 500 MG: 500 TABLET ORAL at 08:43

## 2021-01-05 RX ADMIN — LISINOPRIL 20 MG: 20 TABLET ORAL at 08:43

## 2021-01-05 RX ADMIN — HYDRALAZINE HYDROCHLORIDE 50 MG: 50 TABLET, FILM COATED ORAL at 08:42

## 2021-01-05 RX ADMIN — PREGABALIN 300 MG: 100 CAPSULE ORAL at 08:42

## 2021-01-05 RX ADMIN — VENLAFAXINE HYDROCHLORIDE 150 MG: 150 CAPSULE, EXTENDED RELEASE ORAL at 08:42

## 2021-01-05 NOTE — PROGRESS NOTES
Problem: Falls - Risk of  Goal: *Absence of Falls  Description: Document Estelle Benitez Fall Risk and appropriate interventions in the flowsheet.   Outcome: Progressing Towards Goal  Note: Fall Risk Interventions:            Medication Interventions: Assess postural VS orthostatic hypotension, Bed/chair exit alarm, Teach patient to arise slowly    Elimination Interventions: Bed/chair exit alarm, Call light in reach, Toileting schedule/hourly rounds

## 2021-01-05 NOTE — PROGRESS NOTES
Physical Therapy Screening:    An [facility] screening referral was triggered for physical therapy based on results obtained during the nursing admission assessment. The patients chart was reviewed and the patient is appropriate for a skilled therapy evaluation if there is a decline in functional mobility from baseline. Please order a consult for physical therapy if you are in agreement and would like an evaluation to be completed. Thank you.     Isabel Thorpe, PT, DPT

## 2021-01-05 NOTE — PROGRESS NOTES
Hospitalist Progress Note    NAME: Otoniel Potter   :  1950   MRN:  [id number]          Assessment / Plan:  Acute metabolic encephalopathy due to hypoglycemia resolved  DM ty 2 on Glimeperide  CT head negative  CT spine shows post op changes  UA neg , CXR normal  off of dextrose drip.    low-dose insulin sliding scale. DC glimeperide  As A1c is only 6.0     HTN: C/w home metoprolol, lisinopril and Imdur     CKD3:  -Creatinine is close to baseline of around 1.5     CHF:  Pulmonary HTN  CAD s/p PCI  S/p Biv ICD  Chronic Afib- holding pradaxa  Chr anemia  C/w home meds bumex,  lisinopril, metoprolol  replete K     Spinal stenosis  L1 fracture recent Kyphoplasty  -  T12 compression fracture. -  interventional radiology consulted await f/u for kyphoplasty   -Hold dabigatran in anticipation for procedure     Code Status: Full code  Surrogate Decision Maker:    DVT Prophylaxis: On Dabigatran/SCD     Baseline: Walks with walker    Dispo: PT eval             Subjective:     Chief Complaint / Reason for Physician Visit f/u for hypoglycemia   She is AAO X3 Bg stable.  at bedside, IR to see for kyphoplasty      Objective:     VITALS:   Last 24hrs VS reviewed since prior progress note.  Most recent are:  Patient Vitals for the past 24 hrs:   Temp Pulse Resp BP SpO2   21 0800  80 15 126/66 90 %   21 0345 97.9 °F (36.6 °C) 80 22 120/72 97 %   21 0236     96 %   21 2313 98.3 °F (36.8 °C) 80 15 (!) 144/61 99 %   21 2009     98 %   21 1926 98 °F (36.7 °C) 80 14 (!) 113/59 96 %   21 1801 98.1 °F (36.7 °C) 80 18 (!) 122/55 98 %   21 1743  80 24 136/70    21 1400 98.2 °F (36.8 °C) 80 13 121/64 98 %   21 1352     97 %   21 1050 98.2 °F (36.8 °C) 87 19 (!) 114/54 100 %       Intake/Output Summary (Last 24 hours) at 2021 1041  Last data filed at 2021 0325  Gross per 24 hour   Intake    Output 1550 ml   Net -1550 ml PHYSICAL EXAM:  General: cooperative, no acute distress    Resp:  CTA bilaterally, no wheezing or rales. No accessory muscle use  CV:  Regular  rhythm,  No edema  GI:  Soft,   Non tender. Obese  Neurologic:  Alert and awake, normal speech,   Psych:    Not anxious nor agitated  Skin:  No rashes.   No jaundice    Reviewed most current lab test results and cultures  YES  Reviewed most current radiology test results   YES  Review and summation of old records today    NO  Reviewed patient's current orders and MAR    YES  PMH/SH reviewed - no change compared to H&P          Current Facility-Administered Medications:     insulin lispro (HUMALOG) injection, , SubCUTAneous, AC&HS, Dionne Ramirez MD, Stopped at 01/04/21 1745    glucose chewable tablet 16 g, 4 Tab, Oral, PRN, Maurisio Velasquez MD    dextrose (D50W) injection syrg 12.5-25 g, 12.5-25 g, IntraVENous, PRN, Maurisio Crane MD    glucagon (GLUCAGEN) injection 1 mg, 1 mg, IntraMUSCular, PRN, Maurisio Velasquez MD    dextrose 5% infusion, 25 mL/hr, IntraVENous, CONTINUOUS, Marcelo Crane MD, Last Rate: 25 mL/hr at 01/05/21 0325, 25 mL/hr at 01/05/21 0325    clonazePAM (KlonoPIN) tablet 0.5 mg, 0.5 mg, Oral, QHS, Ari Nolasco MD, 0.5 mg at 01/04/21 2224    albuterol-ipratropium (DUO-NEB) 2.5 MG-0.5 MG/3 ML, 3 mL, Nebulization, Q6H PRN, Air Nolasco MD    aspirin chewable tablet 81 mg, 81 mg, Oral, DAILY, Ari Nolasco MD, 81 mg at 01/05/21 0842    atorvastatin (LIPITOR) tablet 40 mg, 40 mg, Oral, QHS, Ari Nolasco MD, 40 mg at 01/04/21 2224    bumetanide (BUMEX) tablet 1 mg, 1 mg, Oral, DAILY, Ari Nolasco MD, 1 mg at 01/05/21 0842    calcium carbonate (OS-YEFRI) tablet 500 mg [elemental], 500 mg, Oral, DAILY, Ari Nolasco MD, 500 mg at 01/05/21 0843    cyclobenzaprine (FLEXERIL) tablet 10 mg, 10 mg, Oral, TID PRN, Ari Nolasco MD, 10 mg at 01/02/21 2139    [Held by provider] dabigatran etexilate (PRADAXA) capsule 150 mg, 150 mg, Oral, BID, Kelton Hoyos MD, 150 mg at 01/03/21 0856    hydrALAZINE (APRESOLINE) tablet 50 mg, 50 mg, Oral, BID, Kelton Hoyos MD, 50 mg at 01/05/21 0842    isosorbide mononitrate ER (IMDUR) tablet 60 mg, 60 mg, Oral, DAILY, Kelton Hoyos MD, 60 mg at 01/05/21 0842    lisinopriL (PRINIVIL, ZESTRIL) tablet 20 mg, 20 mg, Oral, DAILY, Kelton Hoyos MD, 20 mg at 01/05/21 0843    metoprolol succinate (TOPROL-XL) XL tablet 100 mg, 100 mg, Oral, DAILY, Kelton Hoyos MD, 100 mg at 01/05/21 0842    pantoprazole (PROTONIX) tablet 40 mg, 40 mg, Oral, ACB, Kelton Hoyos MD, 40 mg at 01/05/21 0843    pregabalin (LYRICA) capsule 300 mg, 300 mg, Oral, BID, Kelton Hoyos MD, 300 mg at 01/05/21 0842    arformoterol 15 mcg/budesonide 0.5 mg neb solution, , Nebulization, BID, Kelton Hoyos MD, Given at 01/05/21 4767    traMADoL (ULTRAM) tablet 50 mg, 50 mg, Oral, Q6H PRN, Kelton Hoyos MD, 50 mg at 01/01/21 2007    venlafaxine-SR (EFFEXOR-XR) capsule 150 mg, 150 mg, Oral, BID WITH MEALS, Kelton Hoyos MD, 150 mg at 01/05/21 0842    sodium chloride (NS) flush 5-40 mL, 5-40 mL, IntraVENous, Q8H, Kelton Hoyos MD, 10 mL at 01/05/21 0600    sodium chloride (NS) flush 5-40 mL, 5-40 mL, IntraVENous, PRN, Kelton Hoyos MD    acetaminophen (TYLENOL) tablet 650 mg, 650 mg, Oral, Q6H PRN **OR** acetaminophen (TYLENOL) suppository 650 mg, 650 mg, Rectal, Q6H PRN, Kelton Hoyos MD    polyethylene glycol (MIRALAX) packet 17 g, 17 g, Oral, DAILY PRN, Kelton Hoyos MD, 17 g at 01/02/21 3491    promethazine (PHENERGAN) tablet 12.5 mg, 12.5 mg, Oral, Q6H PRN **OR** ondansetron (ZOFRAN) injection 4 mg, 4 mg, IntraVENous, Q6H PRN, Karri Dalton MD    albuterol-ipratropium (DUO-NEB) 2.5 MG-0.5 MG/3 ML, 3 mL, Nebulization, Q6H RT, Kelton Hoyos MD, 3 mL at 01/05/21 3672  ________________________________________________________________________      Procedures: see electronic medical records for all procedures/Xrays and details which were not copied into this note but were reviewed prior to creation of Plan. LABS:  I reviewed today's most current labs and imaging studies.   Pertinent labs include:  Recent Labs     01/04/21 0132 01/03/21 0334   WBC 5.4 5.3   HGB 8.2* 8.1*   HCT 28.3* 28.3*    187     Recent Labs     01/04/21 0132 01/03/21 0334    143   K 3.4* 3.8   * 112*   CO2 28 24   * 155*   BUN 36* 32*   CREA 1.57* 1.66*   CA 8.7 8.5       Signed: Thony Radford MD

## 2021-01-05 NOTE — PROGRESS NOTES
Reason for Readmission:     Hypoglycemia           RUR Score and Risk Level:   35    High          Resources/supports as identified by patient/family:    Supportive spouse and family       Top Challenges facing patient (as identified by patient/family and CM): Finances/Medication cost?     Medicare A/B  [employer] supplement  Pharmacy - acute [facility] mile, Long term/90 day [facility] order(Part D). Transportation      Spouse transports to all appointments and will transport at DC. Support system or lack thereof? Supportive family    Living arrangements? Pt lives with spouse in a 1 story home with 2 SHAQ/Rails       Self-care/ADLs/Cognition? Pt is alert and oriented x 4. Pt states at baseline she is independent of ADL's, pt does not drive. Pt states she uses a cane for ambulation. Pt states she does have a rolling walker, rollator, shower chair, bed side commode and CPAP in the home for DME. Pt states she does not use Home oxygen at this time. Pt was d/c back to home with [address] from last admission. She said their service is not enough. Pt states she has utilized SNF in the past - [facility] and rehab. Current Advanced Directive/Advance Care Plan: On File           Plan for utilizing home health:   Pt is already open with [address]. May need a resumption HH order. Transition of Care Plan:    Based on readmission, the patient's previous Plan of Care has been evaluated and/or modified. The current Transition of Care Plan is Home with [facility] [place]     VISHNU PLAN  1) Disposition Home with [place]  2) Spouse to transport at [facility]  3) Pt would benefit from PCP Apt at [facility]  4) Pt would benefit from [address] at [address] [place] [address] Management Interventions  PCP Verified by CM:  Yes  Mode of Transport at Discharge: Self(Spouse will transport pt back to home.)  Transition of Care Consult (CM Consult): Discharge Planning, Home Health  Discharge Durable Medical Equipment: No(CHERELLE Tomas, Constanceator, SC, BSC, CPAP)  Current Support Network: Lives with Spouse  Confirm Follow Up Transport: Family  Discharge Location  Discharge Placement: Home with home health    Readmission Assessment  Number of days since last admission?: 8-30 days  Previous disposition: Home with Home Health  Who is being interviewed?: Patient  What was the patient's/caregiver's perception as to why they think they needed to return back to the hospital?: Did not realize care needs would be so extensive  Did you visit your Primary Care Physician after you left the hospital, before you returned this time?: No  Why weren't you able to visit your PCP?: Did not have an appointment  Did you see a specialist, such as Cardiac, Pulmonary, Orthopedic Physician, etc. after you left the hospital?: No  Who advised the patient to return to the hospital?: Self-referral  In our efforts to provide the best possible care to you and others like you, can you think of anything that we could have done to help you after you left the hospital the first time, so that you might not have needed to return so soon?: Arrange for more help when leaving the hospital, Additional Community resources available for illness support    Felipe Iniguez MSW  ED Case Manager   Tyn [phone]

## 2021-01-05 NOTE — PROGRESS NOTES
1900: Bedside shift change report given to Amada Atkinson RN (oncoming nurse) by Adán Valentin RN (offgoing nurse). Report included the following information SBAR, Kardex, Intake/Output and MAR.     1900: Patient in bed, resting quietly. Bed in lowest position, bed alarm on, call bell and phone in reach. Will monitor.

## 2021-01-05 NOTE — PROGRESS NOTES
VISHNU PLAN   Readmission: RUG 35% High    1. Plan is Home with Resumption of Care with [facility] once stable. -CM recommend PT/OT eval prior to DC. 2.  Spouse to transport Pt home in car. 3.  CM to help with appt prior to DC and benefit from resources about [facility]    4. Second IM Letter to be delivered prior to DC    CM will continue to monitor discharge plan.       Wilfred Jones, U   Ext [phone]

## 2021-01-06 ENCOUNTER — APPOINTMENT (OUTPATIENT)
Dept: NUCLEAR MEDICINE | Age: 71
DRG: 981 | End: 2021-01-06
Attending: HOSPITALIST
Payer: MEDICARE

## 2021-01-06 ENCOUNTER — APPOINTMENT (OUTPATIENT)
Dept: INTERVENTIONAL RADIOLOGY/VASCULAR | Age: 71
DRG: 981 | End: 2021-01-06
Attending: EMERGENCY MEDICINE
Payer: MEDICARE

## 2021-01-06 LAB
GLUCOSE BLD STRIP.AUTO-MCNC: 122 MG/DL (ref 65–100)
GLUCOSE BLD STRIP.AUTO-MCNC: 142 MG/DL (ref 65–100)
GLUCOSE BLD STRIP.AUTO-MCNC: 160 MG/DL (ref 65–100)
GLUCOSE BLD STRIP.AUTO-MCNC: 174 MG/DL (ref 65–100)
SERVICE CMNT-IMP: ABNORMAL

## 2021-01-06 PROCEDURE — 82962 GLUCOSE BLOOD TEST: CPT

## 2021-01-06 PROCEDURE — 74011250637 HC RX REV CODE- 250/637: Performed by: STUDENT IN AN ORGANIZED HEALTH CARE EDUCATION/TRAINING PROGRAM

## 2021-01-06 PROCEDURE — 77030003666 HC NDL SPINAL BD -A

## 2021-01-06 PROCEDURE — 74011000250 HC RX REV CODE- 250: Performed by: STUDENT IN AN ORGANIZED HEALTH CARE EDUCATION/TRAINING PROGRAM

## 2021-01-06 PROCEDURE — 2709999900 HC NON-CHARGEABLE SUPPLY

## 2021-01-06 PROCEDURE — 74011000636 HC RX REV CODE- 636: Performed by: STUDENT IN AN ORGANIZED HEALTH CARE EDUCATION/TRAINING PROGRAM

## 2021-01-06 PROCEDURE — 0PU43JZ SUPPLEMENT THORACIC VERTEBRA WITH SYNTHETIC SUBSTITUTE, PERCUTANEOUS APPROACH: ICD-10-PCS | Performed by: STUDENT IN AN ORGANIZED HEALTH CARE EDUCATION/TRAINING PROGRAM

## 2021-01-06 PROCEDURE — C1713 ANCHOR/SCREW BN/BN,TIS/BN: HCPCS

## 2021-01-06 PROCEDURE — 99152 MOD SED SAME PHYS/QHP 5/>YRS: CPT

## 2021-01-06 PROCEDURE — 65660000000 HC RM CCU STEPDOWN

## 2021-01-06 PROCEDURE — 0PS43ZZ REPOSITION THORACIC VERTEBRA, PERCUTANEOUS APPROACH: ICD-10-PCS | Performed by: STUDENT IN AN ORGANIZED HEALTH CARE EDUCATION/TRAINING PROGRAM

## 2021-01-06 PROCEDURE — 94640 AIRWAY INHALATION TREATMENT: CPT

## 2021-01-06 PROCEDURE — 77030040175 HC TAMP SPN BN INFLT KYPH MEDT -I1

## 2021-01-06 PROCEDURE — 74011250636 HC RX REV CODE- 250/636: Performed by: INTERNAL MEDICINE

## 2021-01-06 PROCEDURE — 74011250636 HC RX REV CODE- 250/636: Performed by: STUDENT IN AN ORGANIZED HEALTH CARE EDUCATION/TRAINING PROGRAM

## 2021-01-06 PROCEDURE — 74011250636 HC RX REV CODE- 250/636

## 2021-01-06 RX ORDER — MIDAZOLAM HYDROCHLORIDE 1 MG/ML
INJECTION, SOLUTION INTRAMUSCULAR; INTRAVENOUS
Status: COMPLETED
Start: 2021-01-06 | End: 2021-01-06

## 2021-01-06 RX ORDER — LIDOCAINE HYDROCHLORIDE 20 MG/ML
18 INJECTION, SOLUTION INFILTRATION; PERINEURAL ONCE
Status: COMPLETED | OUTPATIENT
Start: 2021-01-06 | End: 2021-01-06

## 2021-01-06 RX ORDER — WATER FOR INJECTION,STERILE
VIAL (ML) INJECTION
Status: DISCONTINUED
Start: 2021-01-06 | End: 2021-01-07 | Stop reason: HOSPADM

## 2021-01-06 RX ORDER — HEPARIN SODIUM 200 [USP'U]/100ML
400 INJECTION, SOLUTION INTRAVENOUS ONCE
Status: COMPLETED | OUTPATIENT
Start: 2021-01-06 | End: 2021-01-06

## 2021-01-06 RX ORDER — BUPIVACAINE HYDROCHLORIDE 5 MG/ML
10 INJECTION, SOLUTION EPIDURAL; INTRACAUDAL ONCE
Status: COMPLETED | OUTPATIENT
Start: 2021-01-06 | End: 2021-01-06

## 2021-01-06 RX ORDER — MIDAZOLAM HYDROCHLORIDE 1 MG/ML
5 INJECTION, SOLUTION INTRAMUSCULAR; INTRAVENOUS ONCE
Status: ACTIVE | OUTPATIENT
Start: 2021-01-06 | End: 2021-01-06

## 2021-01-06 RX ORDER — FENTANYL CITRATE 50 UG/ML
100 INJECTION, SOLUTION INTRAMUSCULAR; INTRAVENOUS
Status: DISCONTINUED | OUTPATIENT
Start: 2021-01-06 | End: 2021-01-07 | Stop reason: HOSPADM

## 2021-01-06 RX ORDER — CLINDAMYCIN PHOSPHATE 900 MG/50ML
900 INJECTION INTRAVENOUS ONCE
Status: COMPLETED | OUTPATIENT
Start: 2021-01-06 | End: 2021-01-06

## 2021-01-06 RX ADMIN — HEPARIN SODIUM IN SODIUM CHLORIDE 400 UNITS: 200 INJECTION INTRAVENOUS at 11:00

## 2021-01-06 RX ADMIN — ASPIRIN 81 MG: 81 TABLET, CHEWABLE ORAL at 13:17

## 2021-01-06 RX ADMIN — ATORVASTATIN CALCIUM 40 MG: 40 TABLET, FILM COATED ORAL at 22:54

## 2021-01-06 RX ADMIN — VENLAFAXINE HYDROCHLORIDE 150 MG: 150 CAPSULE, EXTENDED RELEASE ORAL at 16:44

## 2021-01-06 RX ADMIN — CLINDAMYCIN IN 5 PERCENT DEXTROSE 900 MG: 18 INJECTION, SOLUTION INTRAVENOUS at 10:45

## 2021-01-06 RX ADMIN — METOPROLOL SUCCINATE 100 MG: 50 TABLET, EXTENDED RELEASE ORAL at 13:17

## 2021-01-06 RX ADMIN — DEXTROSE MONOHYDRATE 25 ML/HR: 5 INJECTION, SOLUTION INTRAVENOUS at 02:32

## 2021-01-06 RX ADMIN — ARFORMOTEROL TARTRATE: 15 SOLUTION RESPIRATORY (INHALATION) at 21:36

## 2021-01-06 RX ADMIN — PREGABALIN 300 MG: 100 CAPSULE ORAL at 18:25

## 2021-01-06 RX ADMIN — FENTANYL CITRATE 25 MCG: 50 INJECTION, SOLUTION INTRAMUSCULAR; INTRAVENOUS at 11:07

## 2021-01-06 RX ADMIN — MIDAZOLAM HYDROCHLORIDE 1 MG: 1 INJECTION, SOLUTION INTRAMUSCULAR; INTRAVENOUS at 11:00

## 2021-01-06 RX ADMIN — CLONAZEPAM 0.5 MG: 1 TABLET ORAL at 22:54

## 2021-01-06 RX ADMIN — IPRATROPIUM BROMIDE AND ALBUTEROL SULFATE 3 ML: .5; 3 SOLUTION RESPIRATORY (INHALATION) at 08:11

## 2021-01-06 RX ADMIN — IOPAMIDOL 3 ML: 612 INJECTION, SOLUTION INTRATHECAL at 11:00

## 2021-01-06 RX ADMIN — TRAMADOL HYDROCHLORIDE 50 MG: 50 TABLET, COATED ORAL at 13:27

## 2021-01-06 RX ADMIN — FENTANYL CITRATE 25 MCG: 50 INJECTION, SOLUTION INTRAMUSCULAR; INTRAVENOUS at 11:02

## 2021-01-06 RX ADMIN — IPRATROPIUM BROMIDE AND ALBUTEROL SULFATE 3 ML: .5; 3 SOLUTION RESPIRATORY (INHALATION) at 01:26

## 2021-01-06 RX ADMIN — LISINOPRIL 20 MG: 20 TABLET ORAL at 13:17

## 2021-01-06 RX ADMIN — ARFORMOTEROL TARTRATE: 15 SOLUTION RESPIRATORY (INHALATION) at 08:11

## 2021-01-06 RX ADMIN — Medication 10 ML: at 22:54

## 2021-01-06 RX ADMIN — BUPIVACAINE HYDROCHLORIDE 50 MG: 5 INJECTION, SOLUTION EPIDURAL; INTRACAUDAL; PERINEURAL at 11:00

## 2021-01-06 RX ADMIN — MIDAZOLAM HYDROCHLORIDE 1 MG: 1 INJECTION, SOLUTION INTRAMUSCULAR; INTRAVENOUS at 11:05

## 2021-01-06 RX ADMIN — LIDOCAINE HYDROCHLORIDE 200 MG: 20 INJECTION, SOLUTION INFILTRATION; PERINEURAL at 11:00

## 2021-01-06 RX ADMIN — IPRATROPIUM BROMIDE AND ALBUTEROL SULFATE 3 ML: .5; 3 SOLUTION RESPIRATORY (INHALATION) at 21:35

## 2021-01-06 RX ADMIN — CALCIUM 500 MG: 500 TABLET ORAL at 13:17

## 2021-01-06 RX ADMIN — IPRATROPIUM BROMIDE AND ALBUTEROL SULFATE 3 ML: .5; 3 SOLUTION RESPIRATORY (INHALATION) at 13:23

## 2021-01-06 RX ADMIN — BUMETANIDE 1 MG: 1 TABLET ORAL at 13:17

## 2021-01-06 RX ADMIN — Medication 10 ML: at 13:19

## 2021-01-06 RX ADMIN — HYDRALAZINE HYDROCHLORIDE 50 MG: 50 TABLET, FILM COATED ORAL at 18:25

## 2021-01-06 RX ADMIN — PANTOPRAZOLE SODIUM 40 MG: 40 TABLET, DELAYED RELEASE ORAL at 13:17

## 2021-01-06 RX ADMIN — ISOSORBIDE MONONITRATE 60 MG: 30 TABLET, EXTENDED RELEASE ORAL at 13:17

## 2021-01-06 RX ADMIN — FENTANYL CITRATE 25 MCG: 50 INJECTION, SOLUTION INTRAMUSCULAR; INTRAVENOUS at 11:10

## 2021-01-06 RX ADMIN — MIDAZOLAM HYDROCHLORIDE 1 MG: 1 INJECTION, SOLUTION INTRAMUSCULAR; INTRAVENOUS at 11:08

## 2021-01-06 NOTE — PROGRESS NOTES
Hospitalist Progress Note    NAME: Kaylynn Bar   :  1950   MRN:  [id number]          Assessment / Plan:  Acute metabolic encephalopathy due to hypoglycemia resolved  DM ty 2 on Glimeperide  CT head negative  CT spine shows post op changes  UA neg , CXR normal  off of dextrose drip.    low-dose insulin sliding scale. DC glimeperide  As A1c is only 6.0     HTN: C/w home metoprolol, lisinopril and Imdur     CKD3:  -Creatinine is close to baseline of around 1.5     CHF:  Pulmonary HTN  CAD s/p PCI  S/p Biv ICD  Chronic Afib  Chr anemia  C/w home meds bumex,  lisinopril, metoprolol  replete K  Resume pradaxa after kyphoplasty     Spinal stenosis  L1 fracture recent Kyphoplasty  NowT12 compression fracture. -  interventional radiology consulted  for kyphoplasty  today  -Hold dabigatran and resume after procedure     Code Status: Full code  Surrogate Decision Maker:    DVT Prophylaxis: On Dabigatran/SCD     Baseline: Walks with walker    Dispo: TBD in 1-2 days pending PT eval after kyphoplasty             Subjective:     Chief Complaint / Reason for Physician Visit f/u for hypoglycemia   She is doing well and for kyphoplasty today d/w  at bedside       Objective:     VITALS:   Last 24hrs VS reviewed since prior progress note.  Most recent are:  Patient Vitals for the past 24 hrs:   Temp Pulse Resp BP SpO2   21 0947  80 16 136/60 95 %   21 0811     97 %   21 0720 98.1 °F (36.7 °C) 85 24 (!) 148/69 96 %   21 0344 98.1 °F (36.7 °C) 80 16 128/74 93 %   21 0127     93 %   21 2328 98.4 °F (36.9 °C) 80 16 125/72 92 %   21 1939     95 %   21 1931 98.3 °F (36.8 °C) 89 29 (!) 135/59 98 %   21 1400 98.3 °F (36.8 °C) 80 12 115/60 99 %   21 1352     96 %   21 1200  80 17 115/60 98 %   01/05/21 1134 98 °F (36.7 °C) 83 18 124/61 95 %       Intake/Output Summary (Last 24 hours) at 2021 1010  Last data filed at 2021 0501  Gross per 24 hour   Intake    Output 1700 ml   Net -1700 ml        PHYSICAL EXAM:  General: cooperative, no acute distress    Resp:  CTA bilaterally, no wheezing or rales. No accessory muscle use  CV:  Regular  rhythm,  No edema  GI:  Soft,   Non tender. Obese  Neurologic:  Alert and awake, normal speech,   Psych:    Not anxious nor agitated  Skin:  No rashes.   No jaundice    Reviewed most current lab test results and cultures  YES  Reviewed most current radiology test results   YES  Review and summation of old records today    NO  Reviewed patient's current orders and MAR    YES  PMH/SH reviewed - no change compared to H&P          Current Facility-Administered Medications:     clindamycin (CLEOCIN) 900mg D5W 50mL IVPB (premix), 900 mg, IntraVENous, ONCE, Ciro Gracia MD    midazolam (PF) (VERSED) injection 5 mg, 5 mg, IntraVENous, ONCE, Ciro Gracia MD    fentaNYL citrate (PF) injection 100 mcg, 100 mcg, IntraVENous, Multiple, Ciro Gracia MD    insulin lispro (HUMALOG) injection, , SubCUTAneous, AC&HS, Eladio Martin MD, Stopped at 01/04/21 1745    glucose chewable tablet 16 g, 4 Tab, Oral, PRN, Toñito Pham MD    dextrose (D50W) injection syrg 12.5-25 g, 12.5-25 g, IntraVENous, PRN, Toñito Crane MD    glucagon (GLUCAGEN) injection 1 mg, 1 mg, IntraMUSCular, PRN, Toñito Pham MD    dextrose 5% infusion, 25 mL/hr, IntraVENous, CONTINUOUS, Marcelo Crane MD, Last Rate: 25 mL/hr at 01/06/21 0232, 25 mL/hr at 01/06/21 0232    clonazePAM (KlonoPIN) tablet 0.5 mg, 0.5 mg, Oral, QHS, Mario Ventura MD, 0.5 mg at 01/05/21 2146    albuterol-ipratropium (DUO-NEB) 2.5 MG-0.5 MG/3 ML, 3 mL, Nebulization, Q6H PRN, Mario Ventura MD    aspirin chewable tablet 81 mg, 81 mg, Oral, DAILY, Mario Ventura MD, 81 mg at 01/05/21 0842    atorvastatin (LIPITOR) tablet 40 mg, 40 mg, Oral, QHS, Karri Dalton MD, 40 mg at 01/05/21 2146    bumetanide (BUMEX) tablet 1 mg, 1 mg, Oral, DAILY, Maris Wolf MD, 1 mg at 01/05/21 6578    calcium carbonate (OS-YEFRI) tablet 500 mg [elemental], 500 mg, Oral, DAILY, Maris Wolf MD, 500 mg at 01/05/21 0843    cyclobenzaprine (FLEXERIL) tablet 10 mg, 10 mg, Oral, TID PRN, Maris Wolf MD, 10 mg at 01/02/21 2133    [Held by provider] dabigatran etexilate (PRADAXA) capsule 150 mg, 150 mg, Oral, BID, Maris Wolf MD, 150 mg at 01/03/21 0856    hydrALAZINE (APRESOLINE) tablet 50 mg, 50 mg, Oral, BID, Maris Wolf MD, 50 mg at 01/05/21 1841    isosorbide mononitrate ER (IMDUR) tablet 60 mg, 60 mg, Oral, DAILY, Maris Wolf MD, 60 mg at 01/05/21 0842    lisinopriL (PRINIVIL, ZESTRIL) tablet 20 mg, 20 mg, Oral, DAILY, Maris Wolf MD, 20 mg at 01/05/21 0843    metoprolol succinate (TOPROL-XL) XL tablet 100 mg, 100 mg, Oral, DAILY, Maris Wolf MD, 100 mg at 01/05/21 0842    pantoprazole (PROTONIX) tablet 40 mg, 40 mg, Oral, ACB, Maris Wolf MD, 40 mg at 01/05/21 0843    pregabalin (LYRICA) capsule 300 mg, 300 mg, Oral, BID, Maris Wolf MD, 300 mg at 01/05/21 1841    arformoterol 15 mcg/budesonide 0.5 mg neb solution, , Nebulization, BID, Maris Wolf MD, Given at 01/06/21 0811    traMADoL (ULTRAM) tablet 50 mg, 50 mg, Oral, Q6H PRN, Maris Wolf MD, 50 mg at 01/01/21 2007    venlafaxine-SR (EFFEXOR-XR) capsule 150 mg, 150 mg, Oral, BID WITH MEALS, Maris Wolf MD, 150 mg at 01/05/21 1841    sodium chloride (NS) flush 5-40 mL, 5-40 mL, IntraVENous, Q8H, Maris Wolf MD, 10 mL at 01/05/21 2146    sodium chloride (NS) flush 5-40 mL, 5-40 mL, IntraVENous, PRN, Maris Wolf MD    acetaminophen (TYLENOL) tablet 650 mg, 650 mg, Oral, Q6H PRN **OR** acetaminophen (TYLENOL) suppository 650 mg, 650 mg, Rectal, Q6H PRN, Maris Wolf MD    polyethylene glycol (MIRALAX) packet 17 g, 17 g, Oral, DAILY PRN, Maris Wolf MD, 17 g at 01/02/21 1135   promethazine (PHENERGAN) tablet 12.5 mg, 12.5 mg, Oral, Q6H PRN **OR** ondansetron (ZOFRAN) injection 4 mg, 4 mg, IntraVENous, Q6H PRN, Karri Dalton MD  •  albuterol-ipratropium (DUO-NEB) 2.5 MG-0.5 MG/3 ML, 3 mL, Nebulization, Q6H RT, Karri Dalton MD, 3 mL at 01/06/21 0811  ________________________________________________________________________      Procedures: see electronic medical records for all procedures/Xrays and details which were not copied into this note but were reviewed prior to creation of Plan.      LABS:  I reviewed today's most current labs and imaging studies.  Pertinent labs include:  Recent Labs     01/04/21 0132   WBC 5.4   HGB 8.2*   HCT 28.3*        Recent Labs     01/04/21 0132      K 3.4*   *   CO2 28   *   BUN 36*   CREA 1.57*   CA 8.7       Signed: Amrit Sheridan MD

## 2021-01-06 NOTE — PROGRESS NOTES
1900 Bedside shift change report given to Caleb Islas (oncoming nurse) by Remedios Tuttle (offgoing nurse). Report included the following information SBAR, Kardex, Intake/Output, Recent Results and Cardiac Rhythm NSR. End of Shift Note    Bedside shift change report given to Ferny Abreu (oncoming nurse) by Adelina Downs (offgoing nurse). Report included the following information SBAR, Kardex, Intake/Output, Recent Results and Cardiac Rhythm NSR    Shift worked:  7p-7a     Shift summary and any significant changes:     none     Concerns for physician to address:  none     Zone phone for oncoming shift:          Activity:  Activity Level: Bed Rest  Number times ambulated in hallways past shift: 0  Number of times OOB to chair past shift: 0    Cardiac:   Cardiac Monitoring: Yes      Cardiac Rhythm: Paced    Access:   Current line(s): PIV     Genitourinary:   Urinary status: voiding and external catheter    Respiratory:   O2 Device: Room air  Chronic home O2 use?: NO  Incentive spirometer at bedside: YES     GI:  Last Bowel Movement Date: 01/03/21  Current diet:  DIET NPO  Passing flatus: YES  Tolerating current diet: YES  % Diet Eaten: 75 %    Pain Management:   Patient states pain is manageable on current regimen: YES    Skin:  Preet Score: 18  Interventions: internal/external urinary devices    Patient Safety:  Fall Score:  Total Score: 2  Interventions: bed/chair alarm and gripper socks       Length of Stay:  Expected LOS: 3d 19h  Actual LOS: [facility] ER

## 2021-01-06 NOTE — PROGRESS NOTES
Bone scan canceled. Chart and previous Bone scan reviewed by Dr. Nava Nuñez (radiologist) and IR Dr. Manpreet Butler. Bone scan not needed.

## 2021-01-06 NOTE — ROUTINE PROCESS
Arrived into the xray recovery area via hospital bed, accompanied with transporter(s). Here today for a kyphoplasty.

## 2021-01-06 NOTE — ROUTINE PROCESS
Name of procedure: Kyphoplasty T-12 Sedation medications given: 3 mg versed, 75 mcg fentanyl Sedation tolerated: well Vital Signs: wnl Fluids removed: n/a Samples sent to lab: n/a Any complications related to procedure: none Post Procedure Care Needed/order sets placed in [facility] care. Yes Report called to [facility] at [phone]. [phone]. SBAR items covered.

## 2021-01-06 NOTE — PROGRESS NOTES
End of Shift Note    Bedside shift change report given to Mark Trevizo RN (oncoming nurse) by Taran Juares RN (offgoing nurse). Report included the following information SBAR, Kardex, Intake/Output, MAR and Recent Results    Shift worked:  day     Shift summary and any significant changes:    Received orders for bone scan   Concerns for physician to address:  NPO for possible surgery tomorrow? Zone phone for oncoming shift:          Activity:  Activity Level: Up with Assistance  Number times ambulated in hallways past shift: 0  Number of times OOB to chair past shift: 0    Cardiac:   Cardiac Monitoring: Yes      Cardiac Rhythm: Paced    Access:   Current line(s): PIV     Genitourinary:   Urinary status: voiding and external catheter    Respiratory:   O2 Device: Room air  Chronic home O2 use?: NO  Incentive spirometer at bedside: YES     GI:  Last Bowel Movement Date: 01/03/21  Current diet:  DIET DIABETIC CONSISTENT CARB Regular  Passing flatus: YES  Tolerating current diet: YES  % Diet Eaten: 75 %    Pain Management:   Patient states pain is manageable on current regimen: YES    Skin:  Preet Score: 16  Interventions: turn team, float heels, increase time out of bed, limit briefs and internal/external urinary devices    Patient Safety:  Fall Score:  Total Score: 2  Interventions: bed/chair alarm, gripper socks, pt to call before getting OOB and stay with me (per policy)       Length of Stay:  Expected LOS: 3d 19h  Actual LOS: [place] [id number], [address] [facility]

## 2021-01-06 NOTE — PROGRESS NOTES
ADULT PROTOCOL: JET AEROSOL  REASSESSMENT    Patient  Kevin Auguste     78 y.o.   female     1/6/2021  4:29 PM    Breath Sounds Pre Procedure: Right Breath Sounds: Diminished                               Left Breath Sounds: Diminished    Breath Sounds Post Procedure: Right Breath Sounds: Diminished                                 Left Breath Sounds: Diminished    Breathing pattern: Pre procedure Breathing Pattern: Regular          Post procedure Breathing Pattern: Regular    Heart Rate: Pre procedure Pulse: 83           Post procedure Pulse: 80    Resp Rate: Pre procedure Respirations: 17           Post procedure Respirations: 11      Cough: Pre procedure Cough: Non-productive               Post procedure Cough: Non-productive      Oxygen: O2 Device: Room air       SpO2: Pre procedure SpO2: 97 %                 Post procedure SpO2: 97 %      Nebulizer Therapy: Current medications Aerosolized Medications: DuoNeb          Smoking History: never smoker     Problem List:   Patient Active Problem List   Diagnosis Code    Unspecified hereditary and idiopathic peripheral neuropathy G60.9    HBP (high blood pressure) I10    Spinal stenosis, lumbar region, without neurogenic claudication M48.061    Essential and other specified forms of tremor G25.0, G25.2    IBS (irritable bowel syndrome) K58.9    Depression F32.9    Migraine with aura, without mention of intractable migraine without mention of status migrainosus G43. 109    Right knee DJD M17.11    B12 deficiency E53.8    Ataxia R27.0    Foot pain, right U07.895    Acute systolic heart failure (HCC) I50.21    Fever R50.9    UTI (urinary tract infection) N39.0    Lower abdominal pain R10.30    Type 2 diabetes mellitus with diabetic neuropathy, without long-term current use of insulin (HCC) E11.40    Chronic atrial fibrillation (HCC) J75.54    Systolic CHF, chronic (HCC) I50.22    Cellulitis of left lower leg L03. 116    Anxiety F41.9    Hypercholesterolemia E78.00    Long-term use of high-risk medication Z79.899    Hypokalemia E87.6    Ischemic cardiomyopathy I25.5    CAD (coronary artery disease) I25.10    Epigastric abdominal pain R10.13    S/P placement of cardiac pacemaker Z95.0    Stage 3 chronic kidney disease N18.30    Chronic cholecystitis K81.1    Asthma J45.909    Obesity (BMI 30-39. 9) E66.9    Acute asthma exacerbation J[address]. [address] extremity edema R60.0    Venous stasis dermatitis of both lower extremities I87.2    Ingrown toenail of left foot L60.0    Type 2 diabetes with nephropathy (HCC) E11.21    Respiratory failure (HCC) J96.90    Lower back pain M54.5    Hypoxia R09.02    Lethargy R53.83    Hypoglycemia E16.2       Respiratory Therapist: Enmanuel Billy

## 2021-01-06 NOTE — PROGRESS NOTES
0900: patient being transferred for kyphoplasty     1230: Patient back in room from procedure. 1645: Patient assisted up to chair. Tolerating well, no complaints of pain. End of Shift Note    Bedside shift change report given to Tim Larson RN (oncoming nurse) by Vaughn Hay RN (offgoing nurse). Report included the following information SBAR, Kardex, Procedure Summary, Intake/Output and MAR    Shift worked:  7a-7p     Shift summary and any significant changes:    Patient had kyphoplasty today. Pain managed, patient up in chair this evening and tolerating well. Possible d/c tomorrow? Concerns for physician to address:       Zone phone for oncoming shift:  [phone]     Activity:  Activity Level: Bed Rest  Number times ambulated in hallways past shift: 0  Number of times OOB to chair past shift: 1    Cardiac:   Cardiac Monitoring: Yes      Cardiac Rhythm: Paced    Access:   Current line(s): PIV     Genitourinary:   Urinary status: voiding and external catheter    Respiratory:   O2 Device: Room air  Chronic home O2 use?: NO  Incentive spirometer at bedside: YES     GI:  Last Bowel Movement Date: 01/03/21  Current diet:  DIET GI LITE (POST SURGICAL)  Passing flatus: YES  Tolerating current diet: YES  % Diet Eaten: 75 %    Pain Management:   Patient states pain is manageable on current regimen: YES    Skin:  Preet Score: 17  Interventions: increase time out of bed    Patient Safety:  Fall Score:  Total Score: 3  Interventions: pt to call before getting OOB  High Fall Risk: Yes    Length of Stay:  Expected LOS: 3d 19h  Actual LOS: [address], RN

## 2021-01-06 NOTE — PROGRESS NOTES
Awake post procedure w/o complaint. Able to move lower extremities, + sensation and motor present. Dressings to lower thoracic (T-12) area intact clean and dry. Complaint free.

## 2021-01-07 VITALS
RESPIRATION RATE: 20 BRPM | SYSTOLIC BLOOD PRESSURE: 113 MMHG | TEMPERATURE: 98 F | DIASTOLIC BLOOD PRESSURE: 63 MMHG | HEART RATE: 80 BPM | OXYGEN SATURATION: 96 %

## 2021-01-07 LAB
ANION GAP SERPL CALC-SCNC: 5 MMOL/L (ref 5–15)
BASOPHILS # BLD: 0 K/UL (ref 0–0.1)
BASOPHILS NFR BLD: 0 % (ref 0–1)
BUN SERPL-MCNC: 28 MG/DL (ref 6–20)
BUN/CREAT SERPL: 18 (ref 12–20)
CALCIUM SERPL-MCNC: 8.4 MG/DL (ref 8.5–10.1)
CHLORIDE SERPL-SCNC: 106 MMOL/L (ref 97–108)
CO2 SERPL-SCNC: 30 MMOL/L (ref 21–32)
CREAT SERPL-MCNC: 1.56 MG/DL (ref 0.55–1.02)
DIFFERENTIAL METHOD BLD: ABNORMAL
EOSINOPHIL # BLD: 0.3 K/UL (ref 0–0.4)
EOSINOPHIL NFR BLD: 6 % (ref 0–7)
ERYTHROCYTE [DISTWIDTH] IN BLOOD BY AUTOMATED COUNT: 18.1 % (ref 11.5–14.5)
GLUCOSE BLD STRIP.AUTO-MCNC: 106 MG/DL (ref 65–100)
GLUCOSE BLD STRIP.AUTO-MCNC: 145 MG/DL (ref 65–100)
GLUCOSE SERPL-MCNC: 98 MG/DL (ref 65–100)
HCT VFR BLD AUTO: 29 % (ref 35–47)
HGB BLD-MCNC: 8.2 G/DL (ref 11.5–16)
IMM GRANULOCYTES # BLD AUTO: 0 K/UL (ref 0–0.04)
IMM GRANULOCYTES NFR BLD AUTO: 0 % (ref 0–0.5)
LYMPHOCYTES # BLD: 0.8 K/UL (ref 0.8–3.5)
LYMPHOCYTES NFR BLD: 15 % (ref 12–49)
MAGNESIUM SERPL-MCNC: 1.9 MG/DL (ref 1.6–2.4)
MCH RBC QN AUTO: 23.2 PG (ref 26–34)
MCHC RBC AUTO-ENTMCNC: 28.3 G/DL (ref 30–36.5)
MCV RBC AUTO: 81.9 FL (ref 80–99)
MONOCYTES # BLD: 0.5 K/UL (ref 0–1)
MONOCYTES NFR BLD: 10 % (ref 5–13)
NEUTS SEG # BLD: 3.8 K/UL (ref 1.8–8)
NEUTS SEG NFR BLD: 69 % (ref 32–75)
NRBC # BLD: 0 K/UL (ref 0–0.01)
NRBC BLD-RTO: 0 PER 100 WBC
PLATELET # BLD AUTO: 147 K/UL (ref 150–400)
PMV BLD AUTO: 11.6 FL (ref 8.9–12.9)
POTASSIUM SERPL-SCNC: 3.2 MMOL/L (ref 3.5–5.1)
RBC # BLD AUTO: 3.54 M/UL (ref 3.8–5.2)
RBC MORPH BLD: ABNORMAL
RBC MORPH BLD: ABNORMAL
SERVICE CMNT-IMP: ABNORMAL
SERVICE CMNT-IMP: ABNORMAL
SODIUM SERPL-SCNC: 141 MMOL/L (ref 136–145)
WBC # BLD AUTO: 5.4 K/UL (ref 3.6–11)

## 2021-01-07 PROCEDURE — 97116 GAIT TRAINING THERAPY: CPT

## 2021-01-07 PROCEDURE — 83735 ASSAY OF MAGNESIUM: CPT

## 2021-01-07 PROCEDURE — 94640 AIRWAY INHALATION TREATMENT: CPT

## 2021-01-07 PROCEDURE — 74011250637 HC RX REV CODE- 250/637: Performed by: STUDENT IN AN ORGANIZED HEALTH CARE EDUCATION/TRAINING PROGRAM

## 2021-01-07 PROCEDURE — 74011250637 HC RX REV CODE- 250/637: Performed by: INTERNAL MEDICINE

## 2021-01-07 PROCEDURE — 80048 BASIC METABOLIC PNL TOTAL CA: CPT

## 2021-01-07 PROCEDURE — 74011000250 HC RX REV CODE- 250: Performed by: STUDENT IN AN ORGANIZED HEALTH CARE EDUCATION/TRAINING PROGRAM

## 2021-01-07 PROCEDURE — 97162 PT EVAL MOD COMPLEX 30 MIN: CPT

## 2021-01-07 PROCEDURE — 36415 COLL VENOUS BLD VENIPUNCTURE: CPT

## 2021-01-07 PROCEDURE — 74011000250 HC RX REV CODE- 250: Performed by: HOSPITALIST

## 2021-01-07 PROCEDURE — 82962 GLUCOSE BLOOD TEST: CPT

## 2021-01-07 PROCEDURE — 85025 COMPLETE CBC W/AUTO DIFF WBC: CPT

## 2021-01-07 RX ORDER — POTASSIUM CHLORIDE 750 MG/1
40 TABLET, FILM COATED, EXTENDED RELEASE ORAL ONCE
Status: COMPLETED | OUTPATIENT
Start: 2021-01-07 | End: 2021-01-07

## 2021-01-07 RX ORDER — IPRATROPIUM BROMIDE AND ALBUTEROL SULFATE 2.5; .5 MG/3ML; MG/3ML
3 SOLUTION RESPIRATORY (INHALATION)
Status: DISCONTINUED | OUTPATIENT
Start: 2021-01-07 | End: 2021-01-07 | Stop reason: HOSPADM

## 2021-01-07 RX ADMIN — METOPROLOL SUCCINATE 100 MG: 50 TABLET, EXTENDED RELEASE ORAL at 08:35

## 2021-01-07 RX ADMIN — CALCIUM 500 MG: 500 TABLET ORAL at 08:35

## 2021-01-07 RX ADMIN — LISINOPRIL 20 MG: 20 TABLET ORAL at 08:35

## 2021-01-07 RX ADMIN — ISOSORBIDE MONONITRATE 60 MG: 30 TABLET, EXTENDED RELEASE ORAL at 08:35

## 2021-01-07 RX ADMIN — PREGABALIN 300 MG: 100 CAPSULE ORAL at 08:34

## 2021-01-07 RX ADMIN — IPRATROPIUM BROMIDE AND ALBUTEROL SULFATE 3 ML: .5; 3 SOLUTION RESPIRATORY (INHALATION) at 08:38

## 2021-01-07 RX ADMIN — ASPIRIN 81 MG: 81 TABLET, CHEWABLE ORAL at 08:35

## 2021-01-07 RX ADMIN — BUMETANIDE 1 MG: 1 TABLET ORAL at 08:35

## 2021-01-07 RX ADMIN — PANTOPRAZOLE SODIUM 40 MG: 40 TABLET, DELAYED RELEASE ORAL at 08:35

## 2021-01-07 RX ADMIN — IPRATROPIUM BROMIDE AND ALBUTEROL SULFATE 3 ML: .5; 3 SOLUTION RESPIRATORY (INHALATION) at 14:19

## 2021-01-07 RX ADMIN — ARFORMOTEROL TARTRATE: 15 SOLUTION RESPIRATORY (INHALATION) at 08:43

## 2021-01-07 RX ADMIN — VENLAFAXINE HYDROCHLORIDE 150 MG: 150 CAPSULE, EXTENDED RELEASE ORAL at 08:35

## 2021-01-07 RX ADMIN — POTASSIUM CHLORIDE 40 MEQ: 750 TABLET, FILM COATED, EXTENDED RELEASE ORAL at 11:42

## 2021-01-07 RX ADMIN — HYDRALAZINE HYDROCHLORIDE 50 MG: 50 TABLET, FILM COATED ORAL at 08:35

## 2021-01-07 NOTE — PROGRESS NOTES
Problem: Mobility Impaired (Adult and Pediatric)  Goal: *Acute Goals and Plan of Care (Insert Text)  Description: FUNCTIONAL STATUS PRIOR TO ADMISSION: Patient was modified independent using a rollator for functional mobility. Patient was modified independent for basic and instrumental ADLs. Denies fall history. Recent admission in Dec 2020 with L1 kyphoplasty. HOME SUPPORT PRIOR TO ADMISSION: The patient lived with  who provides assistance as needed. Physical Therapy Goals  Initiated 1/7/2021  1. Patient will move from supine to sit and sit to supine , scoot up and down, and roll side to side in bed with modified independence within 7 day(s). 2.  Patient will transfer from bed to chair and chair to bed with modified independence using the least restrictive device within 7 day(s). 3.  Patient will perform sit to stand with modified independence within 7 day(s). 4.  Patient will ambulate with modified independence for 150 feet with the least restrictive device within 7 day(s). 5.  Patient will ascend/descend 2 stairs with 2 handrail(s) with supervision/set-up within 7 day(s). Outcome: Not Met   PHYSICAL THERAPY EVALUATION  Patient: Ivett Smith (89 y.o. female)  Date: 1/7/2021  Primary Diagnosis: Hypoglycemia [E16.2]  Lethargy [R53.83]        Precautions:   Fall    ASSESSMENT  Based on the objective data described below, the patient presents with impaired functional mobility and decreased independence secondary to impaired sitting and standing balance, generalized weakness, impaired gait mechanics, decreased ROM, increased back and LLE pain, and decreased activity tolerance. Pt with T12 kyphoplasty on 1/6/2021. Pt received supine in bed and agreeable to PT evaluation. VSS at rest and with mobility. Reviewed log roll technique. Needed VCs for technique and safe hand placement during transfers with use of RW.  Tolerated short distance gait training with RW support, however exhibits slow gait speed with decreased step length and clearance RODY throughout. No overt LOB noted. Nikole Pion lift pad placed in chair as patient unable to get out of chair yesterday, however patient able to stand from chair this date. Pt was left sitting in bedside chair with all needs met and RN aware following therapy session. Patient reporting close to baseline mobility, however is currently requiring physical assist x 1-2. Anticipate patient will require additional acute PT prior to returning home with HHPT and 24/7 physical assistance. Current Level of Function Impacting Discharge (mobility/balance): min-mod A x 1-2    Functional Outcome Measure: The patient scored 40/100 on the Barthel Index outcome measure which is indicative of 60% impairment. Other factors to consider for discharge: increased risk for falls; decline from baseline mobility     Patient will benefit from skilled therapy intervention to address the above noted impairments. PLAN :  Recommendations and Planned Interventions: bed mobility training, transfer training, gait training, therapeutic exercises, patient and family training/education and therapeutic activities      Frequency/Duration: Patient will be followed by physical therapy:  4 times a week to address goals. Recommendation for discharge: (in order for the patient to meet his/her long term goals)  Physical therapy at least 2 days/week in the home AND ensure assist and/or supervision for safety with mobility and ADLs    This discharge recommendation:  A follow-up discussion with the attending provider and/or case management is planned    IF patient discharges home will need the following DME: patient owns DME required for discharge         SUBJECTIVE:   Patient stated I'm having a hard time standing.     OBJECTIVE DATA SUMMARY:   HISTORY:    Past Medical History:   Diagnosis Date    Anxiety 1/22/2018    Arthritis     OA    Asthma     Diabetes ([facility]     Essential hypertension     GERD (gastroesophageal reflux disease)     Hypercholesterolemia 1/22/2018    Hypertension     Ill-defined condition     diverticulitis    Long-term use of high-risk medication 1/22/2018    Neuropathy     Obesity (BMI 30-39.9) 4/30/2019    Other ill-defined conditions(799.89)     IBS, spinal stenosis    Plantar fasciitis     Psychiatric disorder     depression, anxiety    Sleep apnea     uses CPAP    Type 2 diabetes mellitus with diabetic neuropathy, without long-term current use of insulin ([facility] [address] 6/5/2016     Past Surgical History:   Procedure Laterality Date    COLONOSCOPY N/A 3/14/2018    COL[facility]COPY performed [facility] at [facility] ENDOSCOPY    HX APPENDECTOMY      HX CHOLECYSTECTOMY  11/15/2018    HX HYSTERECTOMY      HX PACEMAKER      IR KYPHOPLASTY LUMBAR  12/22/2020    IR KYPHOPLASTY THORACIC  1/6/2021    SD CARDIAC SURG PROCEDURE UNLIST      stent       Personal factors and/or comorbidities impacting plan of care: anxiety; neuropathy; HTN; diabetes; recent L1 kyphoplasty    Home Situation  Home Environment: Private residence  # Steps to Enter: 2  Rails to Enter: Yes  Hand Rails : Bilateral  One/Two Story Residence: One story  Living Alone: No  Support Systems: Spouse/Significant Other/Partner  Patient Expects to be Discharged to[de-identified] Unknown  Current DME Used/Available at Home: Anahy Budd, rollator, Cane, straight, Shower chair, Grab bars, Commode, bedside, [employer] dressing aides, CPAP(portable bed railing)  Tub or Shower Type: Tub/Shower combination    EXAMINATION/PRESENTATION/DECISION MAKING:   Critical Behavior:  Neurologic State: Alert  Orientation Level: Appropriate for age, Oriented X4  Cognition: Appropriate safety awareness, Appropriate decision making     Hearing:   Auditory  Auditory Impairment: None  Skin:  Intact  Edema: None  Range Of Motion:  AROM: Generally decreased, functional           PROM: Generally decreased, functional           Strength:    Strength: Generally decreased, functional                    Tone & Sensation:   Tone: Normal              Sensation: Impaired(BLEs to knees; RODY fingers)               Coordination:  Coordination: Within functional limits  Vision:      Functional Mobility:  Bed Mobility:  Rolling: Minimum assistance  Supine to Sit: Minimum assistance; Additional time     Scooting: Stand-by assistance; Additional time  Transfers:  Sit to Stand: Moderate assistance;Minimum assistance;Assist x2; Additional time  Stand to Sit: Minimum assistance        Bed to Chair: Minimum assistance; Moderate assistance;Assist x2; Additional time; Adaptive equipment(use of RW)              Balance:   Sitting: Impaired  Sitting - Static: Good (unsupported)  Sitting - Dynamic: Fair (occasional)  Standing: Impaired; With support  Standing - Static: Fair;Constant support  Standing - Dynamic : Fair;Constant support  Ambulation/Gait Training:  Distance (ft): 30 Feet (ft)  Assistive Device: Gait belt;Walker, rolling  Ambulation - Level of Assistance: Minimal assistance;Assist x1;Additional time; Adaptive equipment     Gait Description (WDL): Exceptions to WDL  Gait Abnormalities: Decreased step clearance(increased trunk flexion)        Base of Support: Narrowed     Speed/Brielle: Slow;Shuffled  Step Length: Left shortened;Right shortened               Functional Measure:  Barthel Index:  Bathin  Bladder: 10  Bowels: 10  Groomin  Dressin  Feedin  Mobility: 0  Stairs: 0  Toilet Use: 5  Transfer (Bed to Chair and Back): 5  Total: 40/100       The Barthel ADL Index: Guidelines  1. The index should be used as a record of what a patient does, not as a record of what a patient could do. 2. The main aim is to establish degree of independence from any help, physical or verbal, however minor and for whatever reason. 3. The need for supervision renders the patient not independent. 4. A patient's performance should be established using the best available evidence.  Asking the patient, friends/relatives and nurses are the usual sources, but direct observation and common sense are also important. However direct testing is not needed. 5. Usually the patient's performance over the preceding 24-48 hours is important, but occasionally longer periods will be relevant. 6. Middle categories imply that the patient supplies over 50 per cent of the effort. 7. Use of aids to be independent is allowed. Conni Cool., Barthel, D.W. ([id number]). Functional evaluation: the Barthel Index. [facility] (14)2. Susan Mcguire femi ENRIQUE Hernandez, Trudy Rodriguez., Ramón Wong., [place], [address] (). Measuring the change indisability after inpatient rehabilitation; comparison of the responsiveness of the Barthel Index and Functional Nursery Measure. Journal of Neurology, Neurosurgery, and Psychiatry, 66(4), 173-336. Tala Jordan, NMarleneJ.A, PAWAN Izaguirre, & Moustapha Herron M.A. (2004.) Assessment of post-stroke quality of life in cost-effectiveness studies: The usefulness of the Barthel Index and the EuroQoL-5D.  Quality of Life Research, 15, 308-88           Physical Therapy Evaluation Charge Determination   History Examination Presentation Decision-Making   HIGH Complexity :3+ comorbidities / personal factors will impact the outcome/ POC  HIGH Complexity : 4+ Standardized tests and measures addressing body structure, function, activity limitation and / or participation in recreation  MEDIUM Complexity : Evolving with changing characteristics  Other outcome measures Barthel Index  MEDIUM      Based on the above components, the patient evaluation is determined to be of the following complexity level: MEDIUM    Pain Ratin-3/10 LLE and back pain with mobility    Activity Tolerance:   Fair, SpO2 stable on RA, and requires rest breaks    After treatment patient left in no apparent distress:   Sitting in chair and Call bell within reach    COMMUNICATION/EDUCATION:   The patients plan of care was discussed with: Physical therapist and Registered nurse. Fall prevention education was provided and the patient/caregiver indicated understanding., Patient/family have participated as able in goal setting and plan of care. , and Patient/family agree to work toward stated goals and plan of care.     Thank you for this referral.  Rain Parrish, PT, DPT   Time Calculation: 35 mins

## 2021-01-07 NOTE — PROGRESS NOTES
Diaz plan:    D/C Home with [facility]  F/U appointment:  Dr. Gurmeet Welch- 1/11/21 at 10:10am.  2nd IM Letter given    to provide transportation at d.c    CM discussed d/c plan with pt. Pt verbalized understanding. No questions or concerns at this time.  had concerns about getting pt up the 3 stairs to enter the home per nursing. CM informed nurse that pt may have to pay for wheelchair [place] [employer] with 2 people to assist.     Nurse called back to share that  has a neighbor that can assist.    Medicare pt has received, reviewed, and signed 2nd IM letter informing them of their right to appeal the discharge. Signed copied has been placed on pt bedside chart. Pt cleared for d/c from CM standpoint. Care Management Interventions  PCP Verified by CM:  Yes  Mode of Transport at Discharge: Self(Spouse will transport pt back to home.)  Hospital Transport Time of Discharge: 1500  Transition of Care Consult (CM Consult): Discharge Planning, Home Health([address])  [address]: No  Reason [facility]: Patient already serviced by other home care/hospice agency  Discharge [address]: No(Cane, RW, Rollator, SC, BSC, CPAP)  Current Support Network: Lives with Spouse  Confirm Follow Up Transport: Family  Discharge Location  Discharge Placement: Home with home health    [address]  Phone: [phone]

## 2021-01-07 NOTE — PROGRESS NOTES
ADULT PROTOCOL: JET AEROSOL  REASSESSMENT    Patient  Denver Magnus     78 y.o.   female     1/7/2021  12:48 AM    Breath Sounds Pre Procedure: Right Breath Sounds: Diminished                               Left Breath Sounds: Diminished    Breath Sounds Post Procedure: Right Breath Sounds: Diminished                                 Left Breath Sounds: Diminished    Breathing pattern: Pre procedure Breathing Pattern: Regular          Post procedure Breathing Pattern: Regular    Heart Rate: Pre procedure Pulse: 82           Post procedure Pulse: 84    Resp Rate: Pre procedure Respirations: 18           Post procedure Respirations: 18          Cough: Pre procedure Cough: Non-productive               Post procedure Cough: Non-productive                 Oxygen: O2 Device: Room air   FiO2 (%) room air 21%     Changed: NO    SpO2: Pre procedure SpO2: 96 %   without oxygen              Post procedure SpO2: 96 %  without oxygen    Nebulizer Therapy: Current medications Aerosolized Medications: Brovana, Pulmicort      Changed: YES        Problem List:   Patient Active Problem List   Diagnosis Code    Unspecified hereditary and idiopathic peripheral neuropathy G60.9    HBP (high blood pressure) I10    Spinal stenosis, lumbar region, without neurogenic claudication M48.061    Essential and other specified forms of tremor G25.0, G25.2    IBS (irritable bowel syndrome) K58.9    Depression F32.9    Migraine with aura, without mention of intractable migraine without mention of status migrainosus G43. 109    Right knee DJD M17.11    B12 deficiency E53.8    Ataxia R27.0    Foot pain, right J27.124    Acute systolic heart failure (HCC) I50.21    Fever R50.9    UTI (urinary tract infection) N39.0    Lower abdominal pain R10.30    Type 2 diabetes mellitus with diabetic neuropathy, without long-term current use of insulin (HCC) E11.40    Chronic atrial fibrillation (HCC) I02.06    Systolic CHF, chronic (HCC) I50.22    Cellulitis of left lower leg L03. 116    Anxiety F41.9    Hypercholesterolemia E78.00    Long-term use of high-risk medication Z79.899    Hypokalemia E87.6    Ischemic cardiomyopathy I25.5    CAD (coronary artery disease) I25.10    Epigastric abdominal pain R10.13    S/P placement of cardiac pacemaker Z95.0    Stage 3 chronic kidney disease N18.30    Chronic cholecystitis K81.1    Asthma J45.909    Obesity (BMI 30-39. 9) E66.9    Acute asthma exacerbation J45. [address] extremity edema R60.0    Venous stasis dermatitis of both lower extremities I87.2    Ingrown toenail of left foot L60.0    Type 2 diabetes with nephropathy (HCC) E11.21    Respiratory failure (HCC) J96.90    Lower back pain M54.5    Hypoxia R09.02    Lethargy R53.83    Hypoglycemia E16.2       Respiratory Therapist: Marsha Poole, RT

## 2021-01-07 NOTE — DISCHARGE SUMMARY
Hospitalist Discharge Summary     Patient ID:  Ivett Smith  [id number]  89 y.o.  1950 1/1/2021    PCP on record: Heidi Long MD    Admit date: 1/1/2021  Discharge date and time: 1/7/2021    DISCHARGE DIAGNOSIS:  Acute metabolic encephalopathy due to hypoglycemia resolved  DM ty 2 on Glimeperide  HTN:   CKD3:    Systolic CHF:  Pulmonary HTN  CAD s/p PCI  S/p Biv ICD  Chronic Afib  Chr anemia   Spinal stenosis  L1 fracture  NowT12 compression fracture. S/p kyphosplasty      CONSULTATIONS:  IP CONSULT TO INTERVENTIONAL RADIOLOGY    Excerpted HPI from H&P of Angelique Delong MD:  Felicity Nuñez is a 78 y.o. female with past medical history of CHF, SHAYLA on CPAP, spinal stenosis status, L1 fracture recent kyphoplasty, diabetes, hypertension was brought in by EMS for lethargy. As per the  she was less responsive than usual yesterday evening, for which they called EMS however she refused the transport. This morning her  has difficulty waking her up and so called the EMS. On route to emergency her blood sugar was 38 and was given dextrose, she received further doses of dextrose in ED because she was still having low blood sugar. Currently she is awake, and AO x2. She was recently discharged from this hospital few days back after being treated for chronic low back pain and had a kyphoplasty, and was also treated for left lower extremity cellulitis. Currently she complains of having worsening lower back pain. She denies any fever but has minimal dry cough, she denies any chest pain, belly pain, change in bowel habits. She mentions having occasional urinary incontinence. She denies any focal deficits.     We were asked to admit for work up and evaluation of the above problems. ______________________________________________________________________  DISCHARGE SUMMARY/HOSPITAL COURSE:  for full details see H&P, daily progress notes, labs, consult notes.    Acute metabolic encephalopathy due to hypoglycemia resolved  DM ty 2 on Glimeperide  CT head negative  CT spine shows post op changes  UA neg , CXR normal  off of dextrose drip.    low-dose insulin sliding scale. DC glimeperide  As A1c is only 6.0     HTN: C/w home metoprolol and Imdur   bp soft , hold lisinopril     CKD3:  -Creatinine is close to baseline of around 1.5     CHF:  Pulmonary HTN  CAD s/p PCI  S/p Biv ICD  Chronic Afib  Chr anemia  C/w home meds bumex,, metoprolol  replete K  Resume pradaxa after kyphoplasty     Spinal stenosis  L1 fracture recent Kyphoplasty  NowT12 compression fracture. S/p kyphoplasty on 01/06  -        _______________________________________________________________________  Patient seen and examined by me on discharge day. Pertinent Findings:  Gen:    Not in distress  Chest: Clear lungs  CVS:   Regular rhythm. No edema  Abd:  Soft, not distended, not tender  Neuro:  Alert,   _______________________________________________________________________  DISCHARGE MEDICATIONS:   Current Discharge Medication List      CONTINUE these medications which have NOT CHANGED    Details   pregabalin (Lyrica) 300 mg capsule Take 300 mg by mouth two (2) times a day. venlafaxine-SR (EFFEXOR XR) 150 mg capsule Take 150 mg by mouth two (2) times daily (with meals). hydrocortisone (ANUSOL-HC) 2.5 % rectal cream Insert  into rectum two (2) times a day. Qty: 30 g, Refills: 0      bumetanide (BUMEX) 1 mg tablet Take 1 Tab by mouth daily. Qty: 30 Tab, Refills: 0      calcium carbonate (OS-YEFRI) 500 mg calcium (1,250 mg) tablet Take 1 Tab by mouth daily. Qty: 30 Tab, Refills: 0      hydrALAZINE (APRESOLINE) 50 mg tablet Take 1 Tab by mouth two (2) times a day. Qty: 30 Tab, Refills: 0      calcium phosphate-vitamin D3 (Caltrate Gummy Bites) 250 mg-10 mcg (400 unit) chew Take 2 Each by mouth daily.  caltrate bone health gummies      oxymetazoline 0.05 % mist 2 Sprays by Nasal route every twelve (12) hours as needed (nose bleed). eucalyptus-peppermint oil (Ponaris) soln 1-2 Drops by Nasal route nightly as needed. clonazePAM (KlonoPIN) 0.5 mg tablet TAKE 1 TABLET BY MOUTH NIGHTLY . DO NOT EXCEED  0.5MG  PER  DAY  Qty: 90 Tab, Refills: 0    Associated Diagnoses: Anxiety      triamcinolone acetonide (KENALOG) 0.1 % topical cream APPLY A THIN FILM OF CREAM EXTERNALLY TO AFFECTED AREA TWICE DAILY  Qty: 15 g, Refills: 3      clotrimazole-betamethasone (LOTRISONE) topical cream APPLY TO THE AFFECTED AREA 2 TIMES DAILY  Qty: 15 g, Refills: 3      isosorbide mononitrate ER (IMDUR) 60 mg CR tablet Take 1 Tab by mouth daily. Qty: 90 Tab, Refills: 3      cyclobenzaprine (FLEXERIL) 10 mg tablet Take 1 tablet by mouth three times daily as needed for muscle spasm  Qty: 90 Tab, Refills: 0      magnesium oxide (MAG-OX) 400 mg tablet Take 1 tablet by mouth twice daily  Qty: 180 Tab, Refills: 3      loratadine (Claritin) 10 mg tablet Take 10 mg by mouth two (2) times a day. Indications: Patient states she is using Claritin instead of Benadryl, is not taking Benadryl per patient. mupirocin calcium (BACTROBAN) 2 % topical cream Apply  to affected area two (2) times a day. Qty: 15 g, Refills: 0      omeprazole (PRILOSEC) 40 mg capsule TAKE 1 CAPSULE EVERY DAY  Qty: 90 Cap, Refills: 3      potassium chloride SR (KLOR-CON 10) 10 mEq tablet Take 1 Tab by mouth three (3) times daily. Qty: 540 Tab, Refills: 3      SYMBICORT 160-4.5 mcg/actuation HFAA INHALE 2 PUFFS BY MOUTH TWICE DAILY  Qty: 1 Inhaler, Refills: 3    Comments: Please consider 90 day supplies to promote better adherence  Associated Diagnoses: Cough      albuterol (PROVENTIL HFA, VENTOLIN HFA, PROAIR HFA) 90 mcg/actuation inhaler Take 1 Puff by inhalation every four (4) hours as needed for Wheezing. Qty: 1 Inhaler, Refills: 6      diphenhydrAMINE (BENADRYL) 25 mg capsule Take 25 mg by mouth two (2) times a day.       nystatin (MYCOSTATIN) topical cream Apply  to affected area two (2) times daily as needed for Skin Irritation (Rash). traMADol (ULTRAM) 50 mg tablet Take 50 mg by mouth daily as needed for Pain (Used to supplement the Lyrica when lyrica doesnt manage the pain on its own). dabigatran etexilate (PRADAXA) 150 mg capsule Take 1 Cap by mouth two (2) times a day. IF NO BLEEDING AT CATH SITE. Qty: 60 Cap, Refills: 12      metoprolol succinate (TOPROL-XL) 100 mg tablet Take 1 Tab by mouth daily. Qty: 30 Tab, Refills: prn      acetaminophen (TYLENOL EXTRA STRENGTH) 500 mg tablet Take 1,000 mg by mouth every eight (8) hours as needed for Pain (Headache). lidocaine (ASPERCREME, LIDOCAINE,) 4 % topical cream Apply  to affected area daily as needed for Pain (Knee pain). sodium chloride (AYR SALINE) 0.65 % nasal squeeze bottle 2 Sprays by Both Nostrils route every eight (8) hours as needed. conjugated estrogens (PREMARIN) 0.625 mg/gram vaginal cream Insert 0.5 g into vagina two (2) times a week. Tuesdays and Thursdays       cholecalciferol, vitamin d3, (VITAMIN D3) 400 unit cap Take 400 Units by mouth daily. aspirin 81 mg chewable tablet Take 81 mg by mouth daily. Associated Diagnoses: Unspecified hereditary and idiopathic peripheral neuropathy; Essential and other specified forms of tremor; Migraine with aura, without mention of intractable migraine without mention of status migrainosus; Type II or unspecified type diabetes mellitus with neurological manifestations, not stated as uncontrolled(250.60) ([address] B12 deficiency; Ataxia; Spinal stenosis, lumbar region, without neurogenic claudication; HBP (high blood pressure); Polyneuropathy in diabetes(357.2); Type II or unspecified type diabetes mellitus with neurological manifestations, not stated as uncontrolled(250.60) ([address] Polyneuropathy in diabetes(357.2)      atorvastatin (LIPITOR) 40 mg tablet Take 1 Tab by mouth nightly.   Qty: 30 Tab, Refills: 12         STOP taking these medications       levoFLOXacin (LEVAQUIN) 750 mg tablet Comments:   Reason for Stopping:         doxycycline (VIBRA-TABS) 100 mg tablet Comments:   Reason for Stopping:         glimepiride (AMARYL) 2 mg tablet Comments:   Reason for Stopping:         lisinopriL (PRINIVIL, ZESTRIL) 40 mg tablet Comments:   Reason for Stopping:                 Patient Follow Up Instructions: Activity: Activity as tolerated  Diet: Cardiac Diet  Wound Care: Wound care for bilateral lower legs to be done daily:  Cleanse the skin with soap and water and dry well. Apply the Hydroguard cream (blue tube) after cleansing every day. Float the heels. Follow-up with pcp  in 7 days. Follow-up tests/labs     Follow-up Information     Follow up With Specialties Details Why Contact Blaze Tripp MD Internal Medicine Go on 1/11/2021 for PCP post hospital follow up appt at 10:10AM  [facility]   this is your home health agency. Please contact them to let them know that you are home.   [address], [address] [place] [address]  [phone]        ________________________________________________________________    Risk of deterioration: High    Condition at Discharge:  Stable  __________________________________________________________________    Disposition  Home with family and home health services    ____________________________________________________________________    Code Status: Full Code  ___________________________________________________________________      Total time in minutes spent coordinating this discharge (includes going over instructions, follow-up, prescriptions, and preparing report for sign off to her PCP) :  >30 minutes    Signed:  Joaquin Slater MD

## 2021-01-07 NOTE — DISCHARGE INSTRUCTIONS
DISCHARGE DIAGNOSIS:  Acute metabolic encephalopathy due to hypoglycemia resolved  DM ty 2 on Glimeperide  HTN:   CKD3:    Systolic CHF:  Pulmonary HTN  CAD s/p PCI  S/p Biv ICD  Chronic Afib  Chr anemia   Spinal stenosis  L1 fracture  NowT12 compression fracture. S/p kyphosplasty      MEDICATIONS:  · It is important that you take the medication exactly as they are prescribed. · Keep your medication in the bottles provided by the pharmacist and keep a list of the medication names, dosages, and times to be taken in your wallet. · Do not take other medications without consulting your doctor. Pain Management: per above medications    What to do at [address]:  Cardiac Diet    Recommended activity: Activity as tolerated    If you have questions regarding the hospital related prescriptions or hospital related issues please call [facility] at . You can always direct your questions to your primary care doctor if you are unable to reach your hospital physician; your PCP works as an extension of your hospital doctor just like your hospital doctor is an extension of your PCP for your time at the hospital [facility]). If you experience any of the following symptoms then please call your primary care physician or return to the emergency room if you cannot get hold of your doctor:  Fever, chills, nausea, vomiting, diarrhea, change in mentation, falling, bleeding, shortness of breath  Patient Education        Hypoglycemia: Care Instructions  Your Care Instructions     Hypoglycemia means that your blood sugar is low and your body is not getting enough fuel. Some people get low blood sugar from not eating often enough. Some medicines to treat diabetes can cause low blood sugar. People who have had surgery on their stomachs or intestines may get hypoglycemia. Problems with the pancreas, kidneys, or liver also can cause low blood sugar.   A snack or drink with sugar in it will raise your blood sugar and should ease your symptoms right away. Your doctor may recommend that you change or stop your medicines until you can get your blood sugar levels under control. In the long run, you may need to change your diet and eating habits so that you get enough fuel for your body throughout the day. Follow-up care is a key part of your treatment and safety. Be sure to make and go to all appointments, and call your doctor if you are having problems. It's also a good idea to know your test results and keep a list of the medicines you take. How can you care for yourself at home? · Know the early signs of low blood sugar. Signs include:  ? Nausea. ? Hunger. ? Feeling nervous, irritable, or shaky. ? Cold, clammy skin. ? Sweating (when you're not exercising). ? A fast heartbeat.  ? Numbness or tingling in fingertips or lips. · If you have early signs of low blood sugar, eat or drink a quick-sugar food. Examples are glucose tablets, table sugar, hard candy (such as Life Savers), fruit juice, and regular (not diet) soda. · Eat small, frequent meals so you don't get too hungry between meals. · Balance extra exercise with eating more. · Keep a written record of your low blood sugar episodes, including what and when you last ate. This helps you know what causes your blood sugar to drop. · Make sure family, friends, and coworkers know the symptoms of low blood sugar and know how to get your sugar level up. · Wear medical alert jewelry that lists your condition. You can buy this at most drugstores. When should you call for help? Call 911 anytime you think you may need emergency care. For example, call if:    · You passed out (lost consciousness).     · You are confused or cannot think clearly.     · Your blood sugar is very high or very low. Watch closely for changes in your health, and be sure to contact your doctor if:    · Your blood sugar stays outside the level your doctor set for you.   · You have any problems. Where can you learn more? Go to http://www.[employer].com/  Enter R955 in the search box to learn more about \"Hypoglycemia: Care Instructions. \"  Current as of: December 20, 2019               Content Version: 12.6  © 8994-3495 [employer]. Care instructions adapted under license by [employer] (which disclaims liability or warranty for this information). If you have questions about a medical condition or this instruction, always ask your healthcare professional. [address] any warranty or liability for your use of this information.

## 2021-01-07 NOTE — PROGRESS NOTES
1900 Bedside shift change report given to Tim Larson (oncoming nurse) by Paula Zavala (offgoing nurse). Report included the following information SBAR, Kardex, Intake/Output, Recent Results and Cardiac Rhythm Paced.     2330: attempted x2 to ambulate pt back to bed from chair, pt unable to stand  For long period of time or assist with transfer, pt required assist back to bed via ludwig lift, pt tolerated well, now resting in bed, vss

## 2021-01-08 ENCOUNTER — PATIENT OUTREACH (OUTPATIENT)
Dept: CASE MANAGEMENT | Age: 71
End: 2021-01-08

## 2021-01-08 ENCOUNTER — TELEPHONE (OUTPATIENT)
Dept: INTERNAL MEDICINE CLINIC | Age: 71
End: 2021-01-08

## 2021-01-08 NOTE — PROGRESS NOTES
Patient was admitted to [facility] on  and discharged on  for  DX LETHARGY, HYPOGLYCEMIA. Outreach made within 2 business days of discharge: Yes    Top Discharge Challenges to be reviewed by the provider   Additional needs identified to be addressed with provider yes  Medication: Clarification  Pt.takes Lisinopril 20 mg daily (per PHI)-not listed on med. rec    Component      Latest Ref Rng & Units 2021   HGB      11.5 - 16.0 g/dL 8.2 (L)   HCT      35.0 - 47.0 % 29.0 (L)     Discussed COVID-19 related testing which was not done at this time. Test results were not done. Patient informed of results, if available? n/a   Method of communication with provider : chart routing, contact office       Advance Care Planning:   Does patient have an Advance Directive: yes, reviewed and current    Was this a readmission? yes   Patient stated reason for the admission: low blood sugar  Patients top risk factors for readmission: medical condition  Interventions to address risk factors:   exercise (amb), diet, adequate hydration, take medications as prescribed, monitor BS, daily weights, follow d/c instructions, f/u with physician/specialist.     Care Transition Nurse (CTN) contacted the patient by telephone to perform post hospital discharge assessment. Verified name and  with patient as identifiers. Provided introduction to self, and explanation of the CTN role. CTN reviewed discharge instructions, medical action plan and red flags with patient who verbalized understanding. Patient given an opportunity to ask questions and does not have any further questions or concerns at this time. The patient agrees to contact the PCP office for questions related to their healthcare. Medication reconciliation was performed with (PHI) , who verbalizes understanding of administration of home medications. Advised obtaining a 90-day supply of all daily and as-needed medications.    Referral to Pharm D needed: no   STOP taking these medications         levoFLOXacin (LEVAQUIN) 750 mg tablet Comments:   Reason for Stopping:            doxycycline (VIBRA-TABS) 100 mg tablet Comments:   Reason for Stopping:            glimepiride (AMARYL) 2 mg tablet Comments:   Reason for Stopping:            lisinopriL (PRINIVIL, ZESTRIL) 40 mg tablet       Wound Care: Wound care for bilateral lower legs to be done daily:  Cleanse the skin with soap and water and dry well. Apply the Hydroguard cream (blue tube) after cleansing every day. Float the heels. Home Health/Outpatient orders at discharge: home health care, PT and [address]: Shan Dumont [facility]  Date of initial visit: 1/8    Covid Risk Education    Patient has following risk factors of: heart failure, asthma, diabetes and chronic kidney disease. Patient/PHI decline education of COVID-19, agrees to contact the PCP office for questions related to COVID-19. For more information on steps you can take to protect yourself, see CDC's How to Protect Yourself     Patient/family/caregiver given information for [employer] and agrees to enroll no    Discussed follow-up appointments: yes  [facility] follow up appointment(s):   Future Appointments   Date Time Provider Tammy Perales   6/11/2021 11:30 AM CHAIR 2 [facility]/ [address] for follow-up call in 7-10 days based on severity of symptoms and risk factors. CTN provided contact information for future needs. Goals      Establish PCP relationships and regularly scheduled appointments. 1/8 Patient will attend  follow-up with PCP, keep a list of medications and FBS and B/p readings to bring to f/u appointments.    Future Appointments   Date Time Provider   1/11/2021 10:10 AM Ronald Rizo MD   6/11/2021 11:30 AM CHAIR 2 JOHNNYSan Carlos [facility] (PT/OT) start 1/8/21; [address] reports he provides pt.transportation to [facility], reports pt.guillermo, reports he has cancel PCP for 1/11, will reschedule as pt.regain her strength. CTN informed pt.of [employer] [phone])  explain briefly type of service; contact information provided, PHI reports will contact if needed. CTN will f/u with pt.in 1 week. -                     Understands red flags post discharge. 1/8 Pt.will takes medications as prescribed, monitor s/s/ Hypoglycemia and treat; observe sx of nausea, increase hunger, nervous, shaky, sweats, increased HR, numbness, tingling of fingertips or lips. Pt.will (eat hard candy, fruit juice or food with protein and carbohydrate)- recheck BS in 15 minutes, until stable, return to ED no improvement. CTN contact information provided to call with questions or concerns; follow up in 1 week. -    1/8 Pt.will weigh daily, monitor wt gain, 2 to 3 lbs. in a day or 5 lbs. /week, keep a daily record of  wts. or any new symptoms of SOB, cough; swollen ankles, feeling more tired than usual with normal activity, pt.will notify physician of sx. Pt.instructed to limit sodium intake; CTN will f/u in 1 week. -[address]

## 2021-01-08 NOTE — TELEPHONE ENCOUNTER
I have reviewed the discharge summary which states that she is to discontinue her lisinopril. Unfortunately I was not involved in her hospital care and him only aware of medications that were placed on the summary. We will likely need to wait until I see her at the time of her IVSHNU in order to figure out what medications she is currently taking.

## 2021-01-08 NOTE — TELEPHONE ENCOUNTER
Georgette Tesfaye [facility] Transition Nurse, states patient was discharged on 1/7/21. On her medication list lisinopril 40 mg states to stop taking it. Katrina Peña states patient is taking 20 mg daily and would like for you to verify and put on her list of meds.     Raphael Ackerman [phone] Kam Herring  [phone] Angelika Inman

## 2021-01-12 ENCOUNTER — APPOINTMENT (OUTPATIENT)
Dept: GENERAL RADIOLOGY | Age: 71
End: 2021-01-12
Attending: EMERGENCY MEDICINE
Payer: MEDICARE

## 2021-01-12 ENCOUNTER — APPOINTMENT (OUTPATIENT)
Dept: CT IMAGING | Age: 71
End: 2021-01-12
Attending: EMERGENCY MEDICINE
Payer: MEDICARE

## 2021-01-12 ENCOUNTER — HOSPITAL ENCOUNTER (EMERGENCY)
Age: 71
Discharge: HOME | End: 2021-01-12
Attending: EMERGENCY MEDICINE
Payer: MEDICARE

## 2021-01-12 VITALS
HEART RATE: 85 BPM | TEMPERATURE: 98.7 F | WEIGHT: 268 LBS | HEIGHT: 69 IN | BODY MASS INDEX: 39.69 KG/M2 | OXYGEN SATURATION: 96 % | RESPIRATION RATE: 20 BRPM | DIASTOLIC BLOOD PRESSURE: 67 MMHG | SYSTOLIC BLOOD PRESSURE: 131 MMHG

## 2021-01-12 DIAGNOSIS — L03.116 CELLULITIS OF LEFT LEG: ICD-10-CM
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
## 2021-01-12 DIAGNOSIS — S89.92XA LEG INJURY, LEFT, INITIAL ENCOUNTER: ICD-10-CM

## 2021-01-12 LAB
ALBUMIN SERPL-MCNC: 2.7 G/DL (ref 3.5–5)
ALBUMIN/GLOB SERPL: 0.9 {RATIO} (ref 1.1–2.2)
ALP SERPL-CCNC: 192 U/L (ref 45–117)
ALT SERPL-CCNC: 25 U/L (ref 12–78)
ANION GAP SERPL CALC-SCNC: 5 MMOL/L (ref 5–15)
AST SERPL-CCNC: 28 U/L (ref 15–37)
BASOPHILS # BLD: 0 K/UL (ref 0–0.1)
BASOPHILS NFR BLD: 0 % (ref 0–1)
BILIRUB SERPL-MCNC: 0.5 MG/DL (ref 0.2–1)
BUN SERPL-MCNC: 19 MG/DL (ref 6–20)
BUN/CREAT SERPL: 14 (ref 12–20)
CALCIUM SERPL-MCNC: 8.7 MG/DL (ref 8.5–10.1)
CHLORIDE SERPL-SCNC: 112 MMOL/L (ref 97–108)
CO2 SERPL-SCNC: 27 MMOL/L (ref 21–32)
COMMENT, HOLDF: NORMAL
CREAT SERPL-MCNC: 1.38 MG/DL (ref 0.55–1.02)
DIFFERENTIAL METHOD BLD: ABNORMAL
EOSINOPHIL # BLD: 0.3 K/UL (ref 0–0.4)
EOSINOPHIL NFR BLD: 5 % (ref 0–7)
ERYTHROCYTE [DISTWIDTH] IN BLOOD BY AUTOMATED COUNT: 18.8 % (ref 11.5–14.5)
GLOBULIN SER CALC-MCNC: 3 G/DL (ref 2–4)
GLUCOSE SERPL-MCNC: 125 MG/DL (ref 65–100)
HCT VFR BLD AUTO: 29.1 % (ref 35–47)
HGB BLD-MCNC: 8.1 G/DL (ref 11.5–16)
IMM GRANULOCYTES # BLD AUTO: 0 K/UL (ref 0–0.04)
IMM GRANULOCYTES NFR BLD AUTO: 1 % (ref 0–0.5)
LYMPHOCYTES # BLD: 0.9 K/UL (ref 0.8–3.5)
LYMPHOCYTES NFR BLD: 13 % (ref 12–49)
MCH RBC QN AUTO: 23.8 PG (ref 26–34)
MCHC RBC AUTO-ENTMCNC: 27.8 G/DL (ref 30–36.5)
MCV RBC AUTO: 85.3 FL (ref 80–99)
MONOCYTES # BLD: 0.6 K/UL (ref 0–1)
MONOCYTES NFR BLD: 8 % (ref 5–13)
NEUTS SEG # BLD: 4.8 K/UL (ref 1.8–8)
NEUTS SEG NFR BLD: 73 % (ref 32–75)
NRBC # BLD: 0.02 K/UL (ref 0–0.01)
NRBC BLD-RTO: 0.3 PER 100 WBC
PLATELET # BLD AUTO: 155 K/UL (ref 150–400)
PMV BLD AUTO: 12.4 FL (ref 8.9–12.9)
POTASSIUM SERPL-SCNC: 4.3 MMOL/L (ref 3.5–5.1)
PROT SERPL-MCNC: 5.7 G/DL (ref 6.4–8.2)
RBC # BLD AUTO: 3.41 M/UL (ref 3.8–5.2)
SAMPLES BEING HELD,HOLD: NORMAL
SODIUM SERPL-SCNC: 144 MMOL/L (ref 136–145)
WBC # BLD AUTO: 6.7 K/UL (ref 3.6–11)

## 2021-01-12 PROCEDURE — 80053 COMPREHEN METABOLIC PANEL: CPT

## 2021-01-12 PROCEDURE — 99284 EMERGENCY DEPT VISIT MOD MDM: CPT

## 2021-01-12 PROCEDURE — 73502 X-RAY EXAM HIP UNI 2-3 VIEWS: CPT

## 2021-01-12 PROCEDURE — 74011250637 HC RX REV CODE- 250/637: Performed by: EMERGENCY MEDICINE

## 2021-01-12 PROCEDURE — 73560 X-RAY EXAM OF KNEE 1 OR 2: CPT

## 2021-01-12 PROCEDURE — 97161 PT EVAL LOW COMPLEX 20 MIN: CPT

## 2021-01-12 PROCEDURE — 73552 X-RAY EXAM OF FEMUR 2/>: CPT

## 2021-01-12 PROCEDURE — 72192 CT PELVIS W/O DYE: CPT

## 2021-01-12 PROCEDURE — 72131 CT LUMBAR SPINE W/O DYE: CPT

## 2021-01-12 PROCEDURE — 97116 GAIT TRAINING THERAPY: CPT

## 2021-01-12 PROCEDURE — 85025 COMPLETE CBC W/AUTO DIFF WBC: CPT

## 2021-01-12 PROCEDURE — 2709999900 HC NON-CHARGEABLE SUPPLY

## 2021-01-12 PROCEDURE — 72100 X-RAY EXAM L-S SPINE 2/3 VWS: CPT

## 2021-01-12 PROCEDURE — 36415 COLL VENOUS BLD VENIPUNCTURE: CPT

## 2021-01-12 PROCEDURE — 99285 EMERGENCY DEPT VISIT HI MDM: CPT

## 2021-01-12 RX ORDER — CLINDAMYCIN HYDROCHLORIDE 300 MG/1
300 CAPSULE ORAL 4 TIMES DAILY
Qty: 28 CAP | Refills: 0 | Status: SHIPPED | OUTPATIENT
Start: 2021-01-12 | End: 2021-01-19

## 2021-01-12 RX ORDER — TRAMADOL HYDROCHLORIDE 50 MG/1
50 TABLET ORAL
Status: COMPLETED | OUTPATIENT
Start: 2021-01-12 | End: 2021-01-12

## 2021-01-12 RX ADMIN — TRAMADOL HYDROCHLORIDE 50 MG: 50 TABLET, FILM COATED ORAL at 07:57

## 2021-01-12 NOTE — ED NOTES
PT states pt is able to walk at home with assistance during transfers and using a walker. The last time the pt came home from the hospital, her  tried to get her into the house and they both ended up falling. PT feels that pt will need transport home with help getting into the house.

## 2021-01-12 NOTE — DISCHARGE INSTRUCTIONS
Given you have a new wound of your left shin, prescription for clindamycin, and antibiotic has been prescribed. Please take as directed.

## 2021-01-12 NOTE — ED PROVIDER NOTES
EMERGENCY DEPARTMENT HISTORY AND PHYSICAL EXAM      Date: 1/12/2021  Patient Name: Preston Avalos    History of Presenting Illness     Chief Complaint   Patient presents with    Leg Pain     Left leg pain and back pain preceding a fall yesterday morning and worsening after. Described as pulling sensation. Denies hitting head when she fell. On blood thinner.  Back Pain     Recently had back surgery, pain at baseline worsening with fall. History Provided By: Patient and EMS    HPI: Preston Avalos, 78 y.o. female with PMHx significant for hypertension, hyperlipidemia, congestive heart failure, heart disease with pacemaker and defibrillator, and recent kyphoplasty for lumbar compression fractures presents to the ED with chief complaint of back pain and left lower extremity pain. Patient fell yesterday because she \"lost her balance\". She states she did not feel dizzy. She hit her left knee on the floor and had a wound to her anterior lower leg. She put ice on her leg without significant relief. She also hurt her back at the same time during the fall. She is concerned that she has caused a problem with her recent kyphoplasty. She complains of pain in her left knee, left lower leg, and left thigh. Patient reports that after her fall yesterday her physical therapist came to the house and was able to get her up and walk her and she was able to bear weight. Usually she walks with a Rollator walker. She was recently discharged on January 7 after a hospital stay for altered mental status due to hypoglycemia. She had had a recent L1 kyphoplasty and had a second kyphoplasty at T12 on January 6. This morning she woke up after sleeping overnight and was unable to get up due to pain. PCP: Jenn Cain MD    No current facility-administered medications on file prior to encounter.       Current Outpatient Medications on File Prior to Encounter   Medication Sig Dispense Refill    hydrocortisone (ANUSOL-HC) 2.5 % rectal cream Insert  into rectum two (2) times a day. 30 g 0    bumetanide (BUMEX) 1 mg tablet Take 1 Tab by mouth daily. 30 Tab 0    calcium carbonate (OS-YEFRI) 500 mg calcium (1,250 mg) tablet Take 1 Tab by mouth daily. 30 Tab 0    hydrALAZINE (APRESOLINE) 50 mg tablet Take 1 Tab by mouth two (2) times a day. 30 Tab 0    calcium phosphate-vitamin D3 (Caltrate Gummy Bites) 250 mg-10 mcg (400 unit) chew Take 2 Each by mouth daily. caltrate bone health gummies      oxymetazoline 0.05 % mist 2 Sprays by Nasal route every twelve (12) hours as needed (nose bleed).  eucalyptus-peppermint oil (Ponaris) soln 1-2 Drops by Nasal route nightly as needed.  pregabalin (Lyrica) 300 mg capsule Take 300 mg by mouth two (2) times a day.  clonazePAM (KlonoPIN) 0.5 mg tablet TAKE 1 TABLET BY MOUTH NIGHTLY . DO NOT EXCEED  0.5MG  PER  DAY 90 Tab 0    triamcinolone acetonide (KENALOG) 0.1 % topical cream APPLY A THIN FILM OF CREAM EXTERNALLY TO AFFECTED AREA TWICE DAILY 15 g 3    clotrimazole-betamethasone (LOTRISONE) topical cream APPLY TO THE AFFECTED AREA 2 TIMES DAILY 15 g 3    isosorbide mononitrate ER (IMDUR) 60 mg CR tablet Take 1 Tab by mouth daily. 90 Tab 3    cyclobenzaprine (FLEXERIL) 10 mg tablet Take 1 tablet by mouth three times daily as needed for muscle spasm 90 Tab 0    magnesium oxide (MAG-OX) 400 mg tablet Take 1 tablet by mouth twice daily 180 Tab 3    loratadine (Claritin) 10 mg tablet Take 10 mg by mouth two (2) times a day. Indications: Patient states she is using Claritin instead of Benadryl, is not taking Benadryl per patient.  mupirocin calcium (BACTROBAN) 2 % topical cream Apply  to affected area two (2) times a day. 15 g 0    omeprazole (PRILOSEC) 40 mg capsule TAKE 1 CAPSULE EVERY DAY 90 Cap 3    potassium chloride SR (KLOR-CON 10) 10 mEq tablet Take 1 Tab by mouth three (3) times daily.  540 Tab 3    SYMBICORT 160-4.5 mcg/actuation HFAA INHALE 2 PUFFS BY MOUTH TWICE DAILY 1 Inhaler 3    albuterol (PROVENTIL HFA, VENTOLIN HFA, PROAIR HFA) 90 mcg/actuation inhaler Take 1 Puff by inhalation every four (4) hours as needed for Wheezing. (Patient taking differently: Take 1 Puff by inhalation two (2) times daily as needed for Wheezing.) 1 Inhaler 6    diphenhydrAMINE (BENADRYL) 25 mg capsule Take 25 mg by mouth two (2) times a day.  nystatin (MYCOSTATIN) topical cream Apply  to affected area two (2) times daily as needed for Skin Irritation (Rash).  traMADol (ULTRAM) 50 mg tablet Take 50 mg by mouth daily as needed for Pain (Used to supplement the Lyrica when lyrica doesnt manage the pain on its own).  dabigatran etexilate (PRADAXA) 150 mg capsule Take 1 Cap by mouth two (2) times a day. IF NO BLEEDING AT CATH SITE. 60 Cap 12    metoprolol succinate (TOPROL-XL) 100 mg tablet Take 1 Tab by mouth daily. 30 Tab prn    acetaminophen (TYLENOL EXTRA STRENGTH) 500 mg tablet Take 1,000 mg by mouth every eight (8) hours as needed for Pain (Headache).  lidocaine (ASPERCREME, LIDOCAINE,) 4 % topical cream Apply  to affected area daily as needed for Pain (Knee pain).  sodium chloride (AYR SALINE) 0.65 % nasal squeeze bottle 2 Sprays by Both Nostrils route every eight (8) hours as needed.  conjugated estrogens (PREMARIN) 0.625 mg/gram vaginal cream Insert 0.5 g into vagina two (2) times a week. Tuesdays and Thursdays       venlafaxine-SR (EFFEXOR XR) 150 mg capsule Take 150 mg by mouth two (2) times daily (with meals).  cholecalciferol, vitamin d3, (VITAMIN D3) 400 unit cap Take 400 Units by mouth daily.  aspirin 81 mg chewable tablet Take 81 mg by mouth daily.  atorvastatin (LIPITOR) 40 mg tablet Take 1 Tab by mouth nightly.  30 Tab 12       Past History     Past Medical History:  Past Medical History:   Diagnosis Date    Anxiety 1/22/2018    Arthritis     OA    Asthma     Diabetes ([address]     Essential hypertension     GERD (gastroesophageal reflux disease)     Hypercholesterolemia 1/22/2018    Hypertension     Ill-defined condition     diverticulitis    Long-term use of high-risk medication 1/22/2018    Neuropathy     Obesity (BMI 30-39.9) 4/30/2019    Other ill-defined conditions(799.89)     IBS, spinal stenosis    Plantar fasciitis     Psychiatric disorder     depression, anxiety    Sleep apnea     uses CPAP    Type 2 diabetes mellitus with diabetic neuropathy, without long-term current use of insulin ([facility] [address] 6/5/2016       Past Surgical History:  Past Surgical History:   Procedure Laterality Date    COLONOSCOPY N/A 3/14/2018    COLONOSCOPY performed by [facility]ie Trejo [facility] at [facility] ENDOSCOPY    HX APPENDECTOMY      HX CHOLECYSTECTOMY  11/15/2018    HX HYSTERECTOMY      HX PACEMAKER      IR KYPHOPLASTY LUMBAR  12/22/2020    IR KYPHOPLASTY THORACIC  1/6/2021    VT CARDIAC SURG PROCEDURE UNLIST      stent       Family History:  Family History   Problem Relation Age of Onset    Arthritis-osteo Mother     Hypertension Mother     High Cholesterol Mother    [place] Crohn's Disease Mother     Heart Disease Mother     Alcohol abuse Father     High Cholesterol Sister     Hypertension Sister     Thyroid Disease Sister     COPD Sister     High Cholesterol Brother     Hypertension Brother     COPD Brother     COPD Child     Inflammatory Bowel Dz Child        Social History:  Social History     Tobacco Use    Smoking status: Never Smoker    Smokeless tobacco: Never Used   Substance Use Topics    Alcohol use: No    Drug use: No       Allergies: Allergies   Allergen Reactions    Sulfa (Sulfonamide Antibiotics) Swelling    Amoxicillin Swelling         Review of Systems   Review of Systems   Constitutional: Negative for chills and fever. HENT: Negative for congestion. Respiratory: Negative for cough, chest tightness, shortness of breath and wheezing. Cardiovascular: Positive for leg swelling.  Negative for chest pain and palpitations. Gastrointestinal: Negative for abdominal pain, diarrhea, nausea and vomiting. Genitourinary: Negative for dysuria, flank pain and hematuria. Musculoskeletal: Positive for arthralgias, back pain, gait problem, myalgias and neck pain. Skin: Positive for wound. Negative for rash. Neurological: Negative for dizziness, syncope, weakness, light-headedness and headaches. Psychiatric/Behavioral: Negative for confusion. The patient is not nervous/anxious. All other systems reviewed and are negative.         Physical Exam    General appearance -obese, well appearing, and in no distress  Eyes - pupils equal and reactive, extraocular eye movements intact  ENT - mucous membranes moist, pharynx normal without lesions  Neck - supple, no significant adenopathy; non-tender to palpation  Chest - clear to auscultation, no wheezes, rales or rhonchi; non-tender to palpation  Heart - normal rate and regular rhythm, S1 and S2 normal, no murmurs noted  Abdomen - soft, nontender, nondistended, no masses or organomegaly  Musculoskeletal -tender to palpation left knee, hip and lumbar area, no deformity or swelling; normal ROM  Extremities - peripheral pulses normal, 2+ bilateral lower extremity edema  Skin - normal coloration and turgor, no rashes, abrasion anterior left lower leg  Neurological - alert, oriented x3, normal speech, no focal findings or movement disorder noted    Diagnostic Study Results     Labs -     Recent Results (from the past 12 hour(s))   CBC WITH AUTOMATED DIFF    Collection Time: 01/12/21  5:49 AM   Result Value Ref Range    WBC 6.7 3.6 - 11.0 K/uL    RBC 3.41 (L) 3.80 - 5.20 M/uL    HGB 8.1 (L) 11.5 - 16.0 g/dL    HCT 29.1 (L) 35.0 - 47.0 %    MCV 85.3 80.0 - 99.0 FL    MCH 23.8 (L) 26.0 - 34.0 PG    MCHC 27.8 (L) 30.0 - 36.5 g/dL    RDW 18.8 (H) 11.5 - 14.5 %    PLATELET 228 018 - 424 K/uL    MPV 12.4 8.9 - 12.9 FL    NRBC 0.3 (H) 0  WBC    ABSOLUTE NRBC 0.02 (H) 0.00 - 0.01 K/uL    NEUTROPHILS 73 32 - 75 %    LYMPHOCYTES 13 12 - 49 %    MONOCYTES 8 5 - 13 %    EOSINOPHILS 5 0 - 7 %    BASOPHILS 0 0 - 1 %    IMMATURE GRANULOCYTES 1 (H) 0.0 - 0.5 %    ABS. NEUTROPHILS 4.8 1.8 - 8.0 K/UL    ABS. LYMPHOCYTES 0.9 0.8 - 3.5 K/UL    ABS. MONOCYTES 0.6 0.0 - 1.0 K/UL    ABS. EOSINOPHILS 0.3 0.0 - 0.4 K/UL    ABS. BASOPHILS 0.0 0.0 - 0.1 K/UL    ABS. IMM. GRANS. 0.0 0.00 - 0.04 K/UL    DF AUTOMATED     METABOLIC PANEL, COMPREHENSIVE    Collection Time: 01/12/21  5:49 AM   Result Value Ref Range    Sodium 144 136 - 145 mmol/L    Potassium 4.3 3.5 - 5.1 mmol/L    Chloride 112 (H) 97 - 108 mmol/L    CO2 27 21 - 32 mmol/L    Anion gap 5 5 - 15 mmol/L    Glucose 125 (H) 65 - 100 mg/dL    BUN 19 6 - 20 MG/DL    Creatinine 1.38 (H) 0.55 - 1.02 MG/DL    BUN/Creatinine ratio 14 12 - 20      GFR est AA 46 (L) >60 ml/min/1.73m2    GFR est non-AA 38 (L) >60 ml/min/1.73m2    Calcium 8.7 8.5 - 10.1 MG/DL    Bilirubin, total 0.5 0.2 - 1.0 MG/DL    ALT (SGPT) 25 12 - 78 U/L    AST (SGOT) 28 15 - 37 U/L    Alk. phosphatase 192 (H) 45 - 117 U/L    Protein, total 5.7 (L) 6.4 - 8.2 g/dL    Albumin 2.7 (L) 3.5 - 5.0 g/dL    Globulin 3.0 2.0 - 4.0 g/dL    A-G Ratio 0.9 (L) 1.1 - 2.2     SAMPLES BEING HELD    Collection Time: 01/12/21  5:49 AM   Result Value Ref Range    SAMPLES BEING HELD BL,RD,SST     COMMENT        Add-on orders for these samples will be processed based on acceptable specimen integrity and analyte stability, which may vary by analyte. Radiologic Studies -   XR HIP LT W OR WO PELV 2-3 VWS   Final Result   IMPRESSION:    No acute abnormality. XR KNEE LT MAX 2 VWS   Final Result   IMPRESSION: No acute abnormality. XR FEMUR LT 2 V   Final Result   IMPRESSION: No acute abnormality. XR SPINE LUMB 2 OR 3 V   Final Result   Impression:      1. No acute osseous abnormality.    2. Evidence of kyphoplasty at T12 and L1.           CT PELV WO CONT    (Results Pending)     CT Results (Last 48 hours)    None        CXR Results  (Last 48 hours)    None            Medical Decision Making   I am the first provider for this patient. I reviewed the vital signs, available nursing notes, past medical history, past surgical history, family history and social history. Vital Signs-Reviewed the patient's vital signs. Patient Vitals for the past 12 hrs:   Temp Pulse Resp BP SpO2   01/12/21 0745    125/65 100 %   01/12/21 0730    (!) 141/60 99 %   01/12/21 0715    135/74 96 %   01/12/21 0700    (!) 142/66 100 %   01/12/21 0615    (!) 144/65 100 %   01/12/21 0549  85  137/63 98 %   01/12/21 0514 98.7 °F (37.1 °C) 88 20 136/64 91 %           Records Reviewed: Nursing Notes and Old Medical Records    Provider Notes (Medical Decision Making):   Differential diagnosis: Fracture, contusion, ligament injury, abrasion, lumbar radiculopathy  We will check x-rays of left lower extremity and lumbar spine. Will check CBC, CMP. Will treat with analgesics and make sure patient can ambulate. ED Course:   Initial assessment performed. The patients presenting problems have been discussed, and they are in agreement with the care plan formulated and outlined with them. I have encouraged them to ask questions as they arise throughout their visit. Progress Notes:     10:22 AM  Patient has been seen by physical therapy. Difficulty is with standing but is able to ambulate with walker which she has at home. Working with case management to ensure that patient can get medical transportation to assist  getting patient into the house as well as confirm that home health will continue to see the patient. Plan is for discharge after these case management elements are wrapped up. Disposition:  Discharge home  PLAN:  1.    Discharge Medication List as of 1/12/2021 11:54 AM      START taking these medications    Details   clindamycin (CLEOCIN) 300 mg capsule Take 1 Cap by mouth four (4) times daily for 7 days. , Normal, Disp-28 Cap, R-0         CONTINUE these medications which have NOT CHANGED    Details   hydrocortisone (ANUSOL-HC) 2.5 % rectal cream Insert  into rectum two (2) times a day., Normal, Disp-30 g, R-0      bumetanide (BUMEX) 1 mg tablet Take 1 Tab by mouth daily. , Normal, Disp-30 Tab, R-0      calcium carbonate (OS-YEFRI) 500 mg calcium (1,250 mg) tablet Take 1 Tab by mouth daily. , Normal, Disp-30 Tab, R-0      hydrALAZINE (APRESOLINE) 50 mg tablet Take 1 Tab by mouth two (2) times a day., Normal, Disp-30 Tab, R-0      calcium phosphate-vitamin D3 (Caltrate Gummy Bites) 250 mg-10 mcg (400 unit) chew Take 2 Each by mouth daily. caltrate bone health gummies, Historical Med      oxymetazoline 0.05 % mist 2 Sprays by Nasal route every twelve (12) hours as needed (nose bleed). , Historical Med      eucalyptus-peppermint oil (Ponaris) soln 1-2 Drops by Nasal route nightly as needed., Historical Med      pregabalin (Lyrica) 300 mg capsule Take 300 mg by mouth two (2) times a day., Historical Med      clonazePAM (KlonoPIN) 0.5 mg tablet TAKE 1 TABLET BY MOUTH NIGHTLY . DO NOT EXCEED  0.5MG  PER  DAY, Normal, Disp-90 Tab,R-0      triamcinolone acetonide (KENALOG) 0.1 % topical cream APPLY A THIN FILM OF CREAM EXTERNALLY TO AFFECTED AREA TWICE DAILY, Normal, Disp-15 g,R-3      clotrimazole-betamethasone (LOTRISONE) topical cream APPLY TO THE AFFECTED AREA 2 TIMES DAILY, Normal, Disp-15 g,R-3      isosorbide mononitrate ER (IMDUR) 60 mg CR tablet Take 1 Tab by mouth daily. , Normal, Disp-90 Tab,R-3      cyclobenzaprine (FLEXERIL) 10 mg tablet Take 1 tablet by mouth three times daily as needed for muscle spasm, Normal, Disp-90 Tab,R-0      magnesium oxide (MAG-OX) 400 mg tablet Take 1 tablet by mouth twice daily, Normal, Disp-180 Tab,R-3      loratadine (Claritin) 10 mg tablet Take 10 mg by mouth two (2) times a day.  Indications: Patient states she is using Claritin instead of Benadryl, is not taking Benadryl per patient., Historical Med      mupirocin calcium (BACTROBAN) 2 % topical cream Apply  to affected area two (2) times a day., Normal, Disp-15 g, R-0      omeprazole (PRILOSEC) 40 mg capsule TAKE 1 CAPSULE EVERY DAY, Normal, Disp-90 Cap, R-3      potassium chloride SR (KLOR-CON 10) 10 mEq tablet Take 1 Tab by mouth three (3) times daily. , Normal, Disp-540 Tab, R-3      SYMBICORT 160-4.5 mcg/actuation HFAA INHALE 2 PUFFS BY MOUTH TWICE DAILY, NormalPlease consider 90 day supplies to promote better adherenceDisp-1 Inhaler, R-3      albuterol (PROVENTIL HFA, VENTOLIN HFA, PROAIR HFA) 90 mcg/actuation inhaler Take 1 Puff by inhalation every four (4) hours as needed for Wheezing., Normal, Disp-1 Inhaler, R-6      diphenhydrAMINE (BENADRYL) 25 mg capsule Take 25 mg by mouth two (2) times a day., Historical Med      nystatin (MYCOSTATIN) topical cream Apply  to affected area two (2) times daily as needed for Skin Irritation (Rash). , Historical Med      traMADol (ULTRAM) 50 mg tablet Take 50 mg by mouth daily as needed for Pain (Used to supplement the Lyrica when lyrica doesnt manage the pain on its own). , Historical Med      dabigatran etexilate (PRADAXA) 150 mg capsule Take 1 Cap by mouth two (2) times a day. IF NO BLEEDING AT CATH SITE., No Print, Disp-60 Cap, R-12      metoprolol succinate (TOPROL-XL) 100 mg tablet Take 1 Tab by mouth daily. , Normal, Disp-30 Tab, R-prn      acetaminophen (TYLENOL EXTRA STRENGTH) 500 mg tablet Take 1,000 mg by mouth every eight (8) hours as needed for Pain (Headache). , Historical Med      lidocaine (ASPERCREME, LIDOCAINE,) 4 % topical cream Apply  to affected area daily as needed for Pain (Knee pain). , Historical Med      sodium chloride (AYR SALINE) 0.65 % nasal squeeze bottle 2 Sprays by Both Nostrils route every eight (8) hours as needed., Historical Med      conjugated estrogens (PREMARIN) 0.625 mg/gram vaginal cream Insert 0.5 g into vagina two (2) times a week.  Tuesdays and Thursdays , Historical Med      venlafaxine-SR (EFFEXOR XR) 150 mg capsule Take 150 mg by mouth two (2) times daily (with meals). , Historical Med      cholecalciferol, vitamin d3, (VITAMIN D3) 400 unit cap Take 400 Units by mouth daily. , Historical Med      aspirin 81 mg chewable tablet Take 81 mg by mouth daily. , Historical Med      atorvastatin (LIPITOR) 40 mg tablet Take 1 Tab by mouth nightly., Normal, Disp-30 Tab, R-12           2. Follow-up Information     Follow up With Specialties Details Why Contact Info    Ronald Rizo MD Internal Medicine Schedule an appointment as soon as possible for a visit   [place]  [phone]      [facility] EMERGENCY DEPT Emergency Medicine  If symptoms worsen [address] [place] [address] Drive  [address] [facility]  [phone]        Return to ED if worse     Diagnosis     Clinical Impression:   1. Fall, initial encounter    2.  Leg injury, left, initial encounter

## 2021-01-12 NOTE — PROGRESS NOTES
PHYSICAL THERAPY EMERGENCY DEPARTMENT EVALUATION WITH DISCHARGE  Patient: Dale Ardon (55 y.o. female)  Date: 1/12/2021  Primary Diagnosis: No admission diagnoses are documented for this encounter. Precautions: fall       ASSESSMENT  Based on the objective data described below, the patient presents with LLE/hip pain s/p fall yesterday and fall on the 7th upon return home from previous admission. Testing with no new issues, no fxs. Pt and  state that when she returned home from the hospital on the 7th of this month, pt and  fell trying to get up the steps into the home. She then was able to use the rollator at home but had another fall yesterday. HHPT had just started. Currently, pt reports 7/10 back and LLE pain at rest and 10/10 with activity. Instructed pt in log roll technique and she required mod A of 1 to come to EOB. She was able to stand to RW with min A. She amb with RW with slow pace, antalgic on L but needing only CGA to advance. She does use a step-to pattern with shortened step on R due to decrease stance tolerance on L. Pt returned to sitting in w/c. Discussed need for use of log roll technique for bed mobility. Also discussed use of BSC as pt states she fell yesterday trying to get to bathroom. Pt would benefit from continued HHPT/OT to continue training and assess best device use in the home and progress mobility given the pain and falls.  advised to assist with all mobility. Given fall upon attempted entry into home last week, would recommend transport into home for safety. All discussed with MD, RN, and CM. Functional Outcome Measure: The patient scored 50/100 on the barthel outcome measure which is indicative of 50% functional impairment. Other factors to consider for discharge:  supportive, 2 step entry over which they fell last week when going home from hospital     Further skilled acute physical therapy is not indicated at this time.      PLAN :  Recommendation for discharge: (in order for the patient to meet his/her long term goals)  Physical therapy/Occupational therapy at least 2 days/week in the home AND ensure assist and/or supervision for safety with mobility/gait    This discharge recommendation:  Has been made in collaboration with the attending provider and/or case management    Equipment recommendations for successful discharge: patient owns DME required for discharge     SUBJECTIVE:   Patient stated It really hurts.     OBJECTIVE DATA SUMMARY:   HISTORY:    Past Medical History:   Diagnosis Date    Anxiety 1/22/2018    Arthritis     OA    Asthma     Diabetes ([address]     Essential hypertension     GERD (gastroesophageal reflux disease)     Hypercholesterolemia 1/22/2018    Hypertension     Ill-defined condition     diverticulitis    Long-term use of high-risk medication 1/22/2018    Neuropathy     Obesity (BMI 30-39.9) 4/30/2019    Other ill-defined conditions(799.89)     IBS, spinal stenosis    Plantar fasciitis     Psychiatric disorder     depression, anxiety    Sleep apnea     uses CPAP    Type 2 diabetes mellitus with diabetic neuropathy, without long-term current use of insulin ([address] 6/5/2016     Past Surgical History:   Procedure Laterality Date    COLONOSCOPY N/A 3/14/2018    COLONOSCOPY performed by Donnette Bence, [facility] at [facility] ENDOSCOPY    HX APPENDECTOMY      HX CHOLECYSTECTOMY  11/15/2018    HX HYSTERECTOMY      HX PACEMAKER      IR KYPHOPLASTY LUMBAR  12/22/2020    IR KYPHOPLASTY THORACIC  1/6/2021    DC CARDIAC SURG PROCEDURE UNLIST      stent     Prior Level of Function/Home Situation: lives with , needed assist with amb/adls  Personal factors and/or comorbidities impacting plan of care:     Home Situation  Home Environment: (P) Private residence  # Steps to Enter: (P) 2  Rails to Enter: (P) Yes  Hand Rails : (P) Bilateral(wide)  One/Two Story Residence: (P) One story  Living Alone: (P) No  Support Systems: (P) Spouse/Significant Other/Partner  Current DME Used/Available at Home: (P) Walker, rollator, Walker, rolling, Commode, bedside, Grab bars, Safety frame [facility], [address] [place] [address], Ne, straight, Shower chair  Tub or Shower Type: (P) Tub/Shower combination    EXAMINATION/PRESENTATION/DECISION MAKING:   Critical Behavior:  Neurologic State: (P) Alert  Orientation Level: (P) Oriented X4        Hearing: Auditory  Auditory Impairment: None    Range Of Motion:  AROM: Generally decreased, functional                       Strength:    Strength: Generally decreased, functional                    Tone & Sensation:   Tone: Normal              Sensation: Impaired(C/o numbness tingling distal LEs)               Coordination:  Coordination: Within functional limits       Functional Mobility:  Bed Mobility:  Rolling: Moderate assistance  Supine to Sit: Moderate assistance     Scooting: Contact guard assistance  Transfers:  Sit to Stand: Minimum assistance;Assist x1;Additional time  Stand to Sit: Minimum assistance;Assist x1;Additional time                       Balance:   Sitting: Intact  Standing: Impaired  Standing - Static: Fair; Other (comment)(with RW)  Standing - Dynamic : Fair(with RW)  Ambulation/Gait Training:  Distance (ft): 25 Feet (ft)  Assistive Device: Gait belt;Walker, rolling  Ambulation - Level of Assistance: Contact guard assistance        Gait Abnormalities: Antalgic;Decreased step clearance; Other(antalgic on LLE)        Base of Support: Widened  Stance: Left decreased  Speed/Brielle: Slow;Pace decreased (<100 feet/min)  Step Length: Right shortened       Functional Measure:  Barthel Index:    Bathin  Bladder: 5  Bowels: 10  Groomin  Dressin  Feeding: 10  Mobility: 0  Stairs: 0  Toilet Use: 5  Transfer (Bed to Chair and Back): 10  Total: 50/100       The Barthel ADL Index: Guidelines  1. The index should be used as a record of what a patient does, not as a record of what a patient could do.   2. The main aim is to establish degree of independence from any help, physical or verbal, however minor and for whatever reason. 3. The need for supervision renders the patient not independent. 4. A patient's performance should be established using the best available evidence. Asking the patient, friends/relatives and nurses are the usual sources, but direct observation and common sense are also important. However direct testing is not needed. 5. Usually the patient's performance over the preceding 24-48 hours is important, but occasionally longer periods will be relevant. 6. Middle categories imply that the patient supplies over 50 per cent of the effort. 7. Use of aids to be independent is allowed. Edd Blue., Barthel, D.W. (7923). Functional evaluation: the Barthel Index. [facility] (14)2. ENRIQUE Jason, Cristina Aparicio., Chung Murdock, [place], [address] (). Measuring the change indisability after inpatient rehabilitation; comparison of the responsiveness of the Barthel Index and Functional [place] Measure. Journal of Neurology, Neurosurgery, and Psychiatry, 66(4), 503-968. Lupe Montilla, N.J.A, PAWAN Izaguirre, & Dae Sewell, MMarleneA. (2004.) Assessment of post-stroke quality of life in cost-effectiveness studies: The usefulness of the Barthel Index and the EuroQoL-5D.  Quality of Life Research, 15, 143-64        Physical Therapy Evaluation Charge Determination   History Examination Presentation Decision-Making   HIGH Complexity :3+ comorbidities / personal factors will impact the outcome/ POC  HIGH Complexity : 4+ Standardized tests and measures addressing body structure, function, activity limitation and / or participation in recreation  MEDIUM Complexity : Evolving with changing characteristics  LOW Complexity : FOTO score of       Based on the above components, the patient evaluation is determined to be of the following complexity level: LOW              Activity Tolerance: Fair    After treatment patient left in no apparent distress:   Sitting in chair, Call bell within reach and Caregiver / family present    COMMUNICATION/EDUCATION:   Communication/Collaboration:  Fall prevention education was provided and the patient/caregiver indicated understanding. Findings and recommendations were discussed with: MD physician and Registered nurse, Physician and Case management.      Thank you for this referral.  Seble Duran, PT   Time Calculation: 26 mins

## 2021-01-12 NOTE — ED NOTES
Pt presents to ed via ems due to back pain and leg pain that has been ongoing. Pt reports she recently had surgery. Pt states yesterday morning she fell and pt does not provide more detail to this. Pt is poor historian of note. 0630: Pt to radiology    7:27 AM: Bedside shift change report given to Van Parrish RN (oncoming nurse) by Carlota Gutiérrez RN (offgoing nurse). Report included the following information SBAR, Kardex, ED Summary, [facility] and Recent Results.

## 2021-01-12 NOTE — PROGRESS NOTES
CM acknowledges consult. CM met with pt and pt's  at bedside. Pt to return home with . Pt's  had concerns with pt getting up stairs and into home upon previous hospital d/c, resulting in both pt and pt's  falling. PT evaluated pt, and is recommending medical transport home for pt to ensure safe entry into home. CM discussed BLS transport with pt and pt's , who are in agreement. CM has submitted referral to [facility] via [employer], estimated  time of 1135. PCS packet completed and put on bedside chart. CM also verified with [facility] that pt is open for SN ,PT, OT. Reviewed this with pt and pt's , who are both in agreement. No further concerns indicated at this time. AVS updated. Pt is ready for discharge from a Care Management standpoint. RN informed.      [facility], [address] [facility]

## 2021-01-12 NOTE — ED NOTES
Assisted pt with getting dressed. Also bandaged left lower shin abrasion with non-stick dressing and Patricia. Dr Qasim Faith reviewed discharge instructions with the patient. The patient verbalized understanding. All questions and concerns were addressed. The patient declined a wheelchair and is discharged [facility] stretcher Pt is alert and oriented x 4. Respirations are clear and unlabored.

## 2021-01-12 NOTE — ED NOTES
Received report from Marjorie Zaman RN. This RN was informed of patient chief complaint, current status, orders completed (to include IV access/medications/radiology testing), outstanding orders that still need to be completed, and the treatment plan. All questions and concerns regarding the patient were addressed.

## 2021-01-13 ENCOUNTER — PATIENT OUTREACH (OUTPATIENT)
Dept: CASE MANAGEMENT | Age: 71
End: 2021-01-13

## 2021-01-13 NOTE — PROGRESS NOTES
Goals      Establish PCP relationships and regularly scheduled appointments. 1/8 Patient will attend  follow-up with PCP, keep a list of medications and FBS and B/p readings to bring to f/u appointments. Future Appointments   Date Time Provider   1/11/2021 10:10 AM Vania Quarles MD   6/11/2021 11:30 AM CHAIR 2 [address] (PT/OT) start 1/8/21; [address] reports he provides pt.transportation to [facility], reports pt.weaken, reports he has cancel PCP for 1/11, will reschedule as pt.regain her strength. CTN informed pt.of [employer] [phone])  explain briefly type of service; contact information provided, PHI reports will contact if needed. CTN will f/u with pt.in 1 week. -[address]       1/13 Pt.seen in ED on 1/12 pt.reports slid to floor, ST to L lower leg/cellulitis, tx applied, Mónica N[facility] continue with services SN/ PT/OT. CTN informed pt.of [employer] [phone])  explain briefly type of service;  contact information provided. CTN will f/u with pt.in 1-2 weeks. -                    Understands red flags post discharge. 1/8 Pt.will takes medications as prescribed, monitor s/s/ Hypoglycemia and treat; observe sx of nausea, increase hunger, nervous, shaky, sweats, increased HR, numbness, tingling of fingertips or lips. Pt.will (eat hard candy, fruit juice or food with protein and carbohydrate)- recheck BS in 15 minutes, until stable, return to ED no improvement. CTN contact information provided to call with questions or concerns; follow up in 1 week. -    1/8 Pt.will weigh daily, monitor wt gain, 2 to 3 lbs. in a day or 5 lbs. /week, keep a daily record of  wts. or any new symptoms of SOB, cough; swollen ankles, feeling more tired than usual with normal activity, pt.will notify physician of sx. Pt.instructed to limit sodium intake; CTN will f/u in 1 week. -[address]     1/13 Pt.reports monitor FBS daily range 105-147; no reports s/s fluid overload listed above.  CTN will follow up with pt.in 1-2 weeks. -[facility]

## 2021-01-27 ENCOUNTER — PATIENT OUTREACH (OUTPATIENT)
Dept: CASE MANAGEMENT | Age: 71
End: 2021-01-27

## 2021-01-27 NOTE — PROGRESS NOTES
Goals      Establish PCP relationships and regularly scheduled appointments. 1/8 Patient will attend  follow-up with PCP, keep a list of medications and FBS and B/p readings to bring to f/u appointments. Future Appointments   Date Time Provider   1/11/2021 10:10 AM Amy Mauricio MD   6/11/2021 11:30 AM CHAIR 2 [address] (PT/OT) start 1/8/21; [facility] reports he provides pt.transportation to [facility], reports pt.weaken, reports he has cancel PCP for 1/11, will reschedule as pt.regain her strength. CTN informed pt.of [employer] [phone])  explain briefly type of service; contact information provided, PHI reports will contact if needed. CTN will f/u with pt.in 1 week. -[address]       1/13 Pt.seen in ED on 1/12 pt.reports slid to floor, ST to L lower leg/cellulitis, tx applied, [facility] continue with services SN/ PT/OT. CTN informed pt.of [employer] [phone])  explain briefly type of service;  contact information provided. CTN will f/u with pt.in 1-2 weeks. -[address]      1/27 Unsuccessful attempt to contact, LVM, follow up in 2 weeks. -                  Understands red flags post discharge. 1/8 Pt.will takes medications as prescribed, monitor s/s/ Hypoglycemia and treat; observe sx of nausea, increase hunger, nervous, shaky, sweats, increased HR, numbness, tingling of fingertips or lips. Pt.will (eat hard candy, fruit juice or food with protein and carbohydrate)- recheck BS in 15 minutes, until stable, return to ED no improvement. CTN contact information provided to call with questions or concerns; follow up in 1 week. -    1/8 Pt.will weigh daily, monitor wt gain, 2 to 3 lbs. in a day or 5 lbs. /week, keep a daily record of  wts. or any new symptoms of SOB, cough; swollen ankles, feeling more tired than usual with normal activity, pt.will notify physician of sx. Pt.instructed to limit sodium intake; CTN will f/u in 1 week. -[address]     1/13 Pt.reports monitor FBS daily range 105-147; no reports s/s fluid overload listed above. CTN will follow up with pt.in 1-2 weeks. -[address]

## 2021-01-28 RX ORDER — CYCLOBENZAPRINE HCL 10 MG
TABLET ORAL
Qty: 90 TAB | Refills: 0 | Status: SHIPPED | OUTPATIENT
Start: 2021-01-28 | End: 2021-05-24

## 2021-02-01 ENCOUNTER — TELEPHONE (OUTPATIENT)
Dept: INTERNAL MEDICINE CLINIC | Age: 71
End: 2021-02-01

## 2021-02-01 DIAGNOSIS — J45.20 MILD INTERMITTENT ASTHMA WITHOUT COMPLICATION: Primary | ICD-10-CM

## 2021-02-01 NOTE — TELEPHONE ENCOUNTER
Tee Chowdhury states patient had a bad cough and wheezing. He would like an order to see her later this week for a pulmonary assessment.       [phone] Lisbeth Fairbanks

## 2021-03-08 ENCOUNTER — OFFICE VISIT (OUTPATIENT)
Dept: INTERNAL MEDICINE CLINIC | Age: 71
End: 2021-03-08
Payer: MEDICARE

## 2021-03-08 VITALS
BODY MASS INDEX: 36.73 KG/M2 | HEART RATE: 80 BPM | RESPIRATION RATE: 18 BRPM | DIASTOLIC BLOOD PRESSURE: 62 MMHG | WEIGHT: 248 LBS | SYSTOLIC BLOOD PRESSURE: 122 MMHG | HEIGHT: 69 IN | TEMPERATURE: 97.2 F | OXYGEN SATURATION: 96 %

## 2021-03-08 DIAGNOSIS — I48.20 CHRONIC ATRIAL FIBRILLATION (HCC): Chronic | ICD-10-CM

## 2021-03-08 DIAGNOSIS — I25.5 ISCHEMIC CARDIOMYOPATHY: Chronic | ICD-10-CM

## 2021-03-08 DIAGNOSIS — I10 ESSENTIAL HYPERTENSION: Chronic | ICD-10-CM

## 2021-03-08 DIAGNOSIS — E11.40 TYPE 2 DIABETES MELLITUS WITH DIABETIC NEUROPATHY, WITHOUT LONG-TERM CURRENT USE OF INSULIN (HCC): Chronic | ICD-10-CM

## 2021-03-08 DIAGNOSIS — I50.22 SYSTOLIC CHF, CHRONIC (HCC): Primary | Chronic | ICD-10-CM

## 2021-03-08 LAB
A-G RATIO,AGRAT: 1.4 RATIO
ALBUMIN SERPL-MCNC: 3.9 G/DL (ref 3.9–5.4)
ALP SERPL-CCNC: 152 U/L (ref 38–126)
ALT SERPL-CCNC: 18 U/L (ref 0–35)
ANION GAP SERPL CALC-SCNC: 19 MMOL/L
AST SERPL W P-5'-P-CCNC: 29 U/L (ref 14–36)
BILIRUB SERPL-MCNC: 0.8 MG/DL (ref 0.2–1.3)
BUN SERPL-MCNC: 26 MG/DL (ref 7–17)
BUN/CREATININE RATIO,BUCR: 14 RATIO
CALCIUM SERPL-MCNC: 9.6 MG/DL (ref 8.4–10.2)
CHLORIDE SERPL-SCNC: 104 MMOL/L (ref 98–107)
CHOL/HDL RATIO,CHHD: 3 RATIO (ref 0–4)
CHOLEST SERPL-MCNC: 110 MG/DL (ref 0–200)
CO2 SERPL-SCNC: 25 MMOL/L (ref 22–32)
CREAT SERPL-MCNC: 1.9 MG/DL (ref 0.7–1.2)
ERYTHROCYTE [DISTWIDTH] IN BLOOD BY AUTOMATED COUNT: 23.6 %
GLOBULIN,GLOB: 2.7
GLUCOSE SERPL-MCNC: 190 MG/DL (ref 65–105)
HBA1C MFR BLD HPLC: 5.5 % (ref 4–5.7)
HCT VFR BLD AUTO: 37.6 % (ref 37–51)
HDLC SERPL-MCNC: 39 MG/DL (ref 35–130)
HGB BLD-MCNC: 11.9 G/DL (ref 12–18)
LDL/HDL RATIO,LDHD: 1 RATIO
LDLC SERPL CALC-MCNC: 32 MG/DL (ref 0–130)
LYMPHOCYTES ABSOLUTE: 1.5 K/UL (ref 0.6–4.1)
LYMPHOCYTES NFR BLD: 17 % (ref 10–58.5)
MCH RBC QN AUTO: 26.5 PG (ref 26–32)
MCHC RBC AUTO-ENTMCNC: 31.6 G/DL (ref 30–36)
MCV RBC AUTO: 83.7 FL (ref 80–97)
MONOCYTES ABS-DIF,2141: 0.5 K/UL (ref 0–1.8)
MONOCYTES NFR BLD: 5.6 % (ref 0.1–24)
NEUTROPHILS # BLD: 77.4 % (ref 37–92)
NEUTROPHILS ABS,2156: 6.7 K/UL (ref 2–7.8)
PLATELET # BLD AUTO: 197 K/UL (ref 140–440)
POTASSIUM SERPL-SCNC: 3.5 MMOL/L (ref 3.6–5)
PROT SERPL-MCNC: 6.6 G/DL (ref 6.3–8.2)
RBC # BLD AUTO: 4.49 M/UL (ref 4.2–6.3)
SODIUM SERPL-SCNC: 148 MMOL/L (ref 137–145)
TRIGL SERPL-MCNC: 195 MG/DL (ref 0–200)
TSH SERPL DL<=0.05 MIU/L-ACNC: 2.75 UIU/ML (ref 0.34–5.6)
VLDLC SERPL CALC-MCNC: 39 MG/DL
WBC # BLD AUTO: 8.6 K/UL (ref 4.1–10.9)

## 2021-03-08 PROCEDURE — 83036 HEMOGLOBIN GLYCOSYLATED A1C: CPT | Performed by: INTERNAL MEDICINE

## 2021-03-08 PROCEDURE — 2022F DILAT RTA XM EVC RTNOPTHY: CPT | Performed by: INTERNAL MEDICINE

## 2021-03-08 PROCEDURE — G8399 PT W/DXA RESULTS DOCUMENT: HCPCS | Performed by: INTERNAL MEDICINE

## 2021-03-08 PROCEDURE — G8536 NO DOC ELDER MAL SCRN: HCPCS | Performed by: INTERNAL MEDICINE

## 2021-03-08 PROCEDURE — 3017F COLORECTAL CA SCREEN DOC REV: CPT | Performed by: INTERNAL MEDICINE

## 2021-03-08 PROCEDURE — G9717 DOC PT DX DEP/BP F/U NT REQ: HCPCS | Performed by: INTERNAL MEDICINE

## 2021-03-08 PROCEDURE — G8427 DOCREV CUR MEDS BY ELIG CLIN: HCPCS | Performed by: INTERNAL MEDICINE

## 2021-03-08 PROCEDURE — G8417 CALC BMI ABV UP PARAM F/U: HCPCS | Performed by: INTERNAL MEDICINE

## 2021-03-08 PROCEDURE — 80061 LIPID PANEL: CPT | Performed by: INTERNAL MEDICINE

## 2021-03-08 PROCEDURE — 1090F PRES/ABSN URINE INCON ASSESS: CPT | Performed by: INTERNAL MEDICINE

## 2021-03-08 PROCEDURE — 3046F HEMOGLOBIN A1C LEVEL >9.0%: CPT | Performed by: INTERNAL MEDICINE

## 2021-03-08 PROCEDURE — G8754 DIAS BP LESS 90: HCPCS | Performed by: INTERNAL MEDICINE

## 2021-03-08 PROCEDURE — 80053 COMPREHEN METABOLIC PANEL: CPT | Performed by: INTERNAL MEDICINE

## 2021-03-08 PROCEDURE — 99214 OFFICE O/P EST MOD 30 MIN: CPT | Performed by: INTERNAL MEDICINE

## 2021-03-08 PROCEDURE — G8752 SYS BP LESS 140: HCPCS | Performed by: INTERNAL MEDICINE

## 2021-03-08 PROCEDURE — 84443 ASSAY THYROID STIM HORMONE: CPT | Performed by: INTERNAL MEDICINE

## 2021-03-08 PROCEDURE — 85025 COMPLETE CBC W/AUTO DIFF WBC: CPT | Performed by: INTERNAL MEDICINE

## 2021-03-08 PROCEDURE — 1101F PT FALLS ASSESS-DOCD LE1/YR: CPT | Performed by: INTERNAL MEDICINE

## 2021-03-08 RX ORDER — LANOLIN ALCOHOL/MO/W.PET/CERES
CREAM (GRAM) TOPICAL EVERY OTHER DAY
COMMUNITY
End: 2021-04-13 | Stop reason: SDUPTHER

## 2021-03-08 RX ORDER — TORSEMIDE 20 MG/1
20 TABLET ORAL DAILY
COMMUNITY
End: 2021-04-13 | Stop reason: SDUPTHER

## 2021-03-08 RX ORDER — CARVEDILOL 25 MG/1
25 TABLET ORAL 2 TIMES DAILY WITH MEALS
COMMUNITY
End: 2021-04-13 | Stop reason: SDUPTHER

## 2021-03-08 NOTE — PROGRESS NOTES
Aide Villa is a 78 y.o. female presenting for Transitions Of Care  . 1. Have you been to the ER, urgent care clinic since your last visit? Hospitalized since your last visit? 2/3/21- 2/12/21 [facility]    2. Have you seen or consulted any other health care providers outside of the [address] [place] [address] since your last visit? Include any pap smears or colon screening. Podiatrist    Fall Risk Assessment, last 12 mths 8/13/2020   Able to walk? Yes   Fall in past 12 months? No   Number of falls in past 12 months -   Fall with injury? -         Abuse Screening Questionnaire 4/30/2020   Do you ever feel afraid of your partner? N   Are you in a relationship with someone who physically or mentally threatens you? N   Is it safe for you to go home? Y       3 most recent PHQ Screens 4/30/2020   Little interest or pleasure in doing things Not at all   Feeling down, depressed, irritable, or hopeless Not at all   Total Score PHQ 2 0   Trouble falling or staying asleep, or sleeping too much Not at all   Feeling tired or having little energy Not at all   Poor appetite, weight loss, or overeating Not at all   Feeling bad about yourself - or that you are a failure or have let yourself or your family down Not at all   Trouble concentrating on things such as school, work, reading, or watching TV Not at all   Moving or speaking so slowly that other people could have noticed; or the opposite being so fidgety that others notice Not at all   Thoughts of being better off dead, or hurting yourself in some way Not at all   PHQ 9 Score 0   How difficult have these problems made it for you to do your work, take care of your home and get along with others Not difficult at all       There are no discontinued medications.

## 2021-03-08 NOTE — PATIENT INSTRUCTIONS

## 2021-03-10 ENCOUNTER — TELEPHONE (OUTPATIENT)
Dept: INTERNAL MEDICINE CLINIC | Age: 71
End: 2021-03-10

## 2021-03-10 NOTE — TELEPHONE ENCOUNTER
----- Message from Elke Cartwright MD sent at 3/10/2021 10:09 AM EST -----  Creatinine slightly up related to diuretics and needs to make sure she is drinking plenty of fluid. Potassium level is slightly low.   Start KCl 10 mEq daily and recheck BMP in 2 weeks  Diabetes stable with A1c of 5.5

## 2021-03-10 NOTE — PROGRESS NOTES
Creatinine slightly up related to diuretics and needs to make sure she is drinking plenty of fluid. Potassium level is slightly low.   Start KCl 10 mEq daily and recheck BMP in 2 weeks  Diabetes stable with A1c of 5.5

## 2021-03-13 ENCOUNTER — IMMUNIZATION (OUTPATIENT)
Dept: FAMILY MEDICINE CLINIC | Age: 71
End: 2021-03-13

## 2021-03-13 DIAGNOSIS — Z23 ENCOUNTER FOR IMMUNIZATION: Primary | ICD-10-CM

## 2021-03-15 ENCOUNTER — TELEPHONE (OUTPATIENT)
Dept: INTERNAL MEDICINE CLINIC | Age: 71
End: 2021-03-15

## 2021-03-15 RX ORDER — MUPIROCIN 20 MG/G
OINTMENT TOPICAL DAILY
Qty: 22 G | Refills: 0 | Status: SHIPPED | OUTPATIENT
Start: 2021-03-15 | End: 2021-03-23 | Stop reason: SDUPTHER

## 2021-03-15 RX ORDER — MUPIROCIN CALCIUM 20 MG/G
CREAM TOPICAL 2 TIMES DAILY
Qty: 15 G | Refills: 0 | Status: SHIPPED | OUTPATIENT
Start: 2021-03-15 | End: 2021-03-31

## 2021-03-15 NOTE — TELEPHONE ENCOUNTER
Patient states this medication is not covered with her insurance and it cost over $100. Can you prescribe a different medication?

## 2021-03-15 NOTE — TELEPHONE ENCOUNTER
Please send pended Rx to [facility]:  Requested Prescriptions     Pending Prescriptions Disp Refills    mupirocin calcium (BACTROBAN) 2 % topical cream 15 g 0     Sig: Apply  to affected area two (2) times a day.

## 2021-03-15 NOTE — TELEPHONE ENCOUNTER
Please send pended rx to [facility]  Requested Prescriptions     Pending Prescriptions Disp Refills    mupirocin (BACTROBAN) 2 % ointment 22 g 0     Sig: Apply  to affected area daily. Signed Prescriptions Disp Refills    mupirocin calcium (BACTROBAN) 2 % topical cream 15 g 0     Sig: Apply  to affected area two (2) times a day.      Authorizing Provider: Javan Osborn

## 2021-03-15 NOTE — TELEPHONE ENCOUNTER
Patient states she has a red blister that appeared overnight. It is on her leg above her ankle. She is diabetic. Please advise.      [phone]

## 2021-03-16 ENCOUNTER — TELEPHONE (OUTPATIENT)
Dept: INTERNAL MEDICINE CLINIC | Age: 71
End: 2021-03-16

## 2021-03-16 NOTE — TELEPHONE ENCOUNTER
Patient's  states [facility] was there today. They told her not to put anything on her blister until it pops.  When should she apply Bactroban cream?     [phone]

## 2021-03-17 ENCOUNTER — TELEPHONE (OUTPATIENT)
Dept: INTERNAL MEDICINE CLINIC | Age: 71
End: 2021-03-17

## 2021-03-17 NOTE — TELEPHONE ENCOUNTER
Patient's blister popped and is red. The nurse from [facility] told them she should be seen by her doctor. He continues to apply the cream prescribed. Please advise.      [phone]

## 2021-03-22 ENCOUNTER — TELEPHONE (OUTPATIENT)
Dept: INTERNAL MEDICINE CLINIC | Age: 71
End: 2021-03-22

## 2021-03-22 NOTE — TELEPHONE ENCOUNTER
Patient states she went to [facility] and they put cream on her blister. The prescription cream you prescribed is almost out. It hurts to touch and is still red. She is not getting better.      [phone]

## 2021-03-23 RX ORDER — MUPIROCIN 20 MG/G
OINTMENT TOPICAL DAILY
Qty: 22 G | Refills: 0 | Status: SHIPPED | OUTPATIENT
Start: 2021-03-23 | End: 2021-03-31 | Stop reason: SDUPTHER

## 2021-03-23 NOTE — TELEPHONE ENCOUNTER
Spoke to - he said her leg looks alittle better today. She is taking Doxycycline prescribed by [facility] and she will complete to 10 days. She needs a refill of Bactroban sent to [facility]:  Requested Prescriptions     Pending Prescriptions Disp Refills    mupirocin (BACTROBAN) 2 % ointment 22 g 0     Sig: Apply  to affected area daily.

## 2021-03-28 DIAGNOSIS — F41.9 ANXIETY: ICD-10-CM

## 2021-03-29 ENCOUNTER — TELEPHONE (OUTPATIENT)
Dept: INTERNAL MEDICINE CLINIC | Age: 71
End: 2021-03-29

## 2021-03-29 RX ORDER — CLONAZEPAM 0.5 MG/1
TABLET ORAL
Qty: 90 TAB | Refills: 0 | Status: SHIPPED | OUTPATIENT
Start: 2021-03-29 | End: 2021-07-11

## 2021-03-29 NOTE — TELEPHONE ENCOUNTER
Patient states her leg still has a blister on it. It drains sometimes and stings occasionally. It is not better. Please advise.     [phone]

## 2021-03-31 ENCOUNTER — OFFICE VISIT (OUTPATIENT)
Dept: INTERNAL MEDICINE CLINIC | Age: 71
End: 2021-03-31
Payer: MEDICARE

## 2021-03-31 VITALS
RESPIRATION RATE: 20 BRPM | SYSTOLIC BLOOD PRESSURE: 122 MMHG | HEART RATE: 80 BPM | TEMPERATURE: 97.9 F | BODY MASS INDEX: 38.8 KG/M2 | OXYGEN SATURATION: 97 % | DIASTOLIC BLOOD PRESSURE: 82 MMHG | HEIGHT: 69 IN | WEIGHT: 262 LBS

## 2021-03-31 DIAGNOSIS — S81.802A OPEN WOUND OF LEFT LOWER LEG, INITIAL ENCOUNTER: Primary | ICD-10-CM

## 2021-03-31 PROCEDURE — G8417 CALC BMI ABV UP PARAM F/U: HCPCS | Performed by: INTERNAL MEDICINE

## 2021-03-31 PROCEDURE — G8536 NO DOC ELDER MAL SCRN: HCPCS | Performed by: INTERNAL MEDICINE

## 2021-03-31 PROCEDURE — G8399 PT W/DXA RESULTS DOCUMENT: HCPCS | Performed by: INTERNAL MEDICINE

## 2021-03-31 PROCEDURE — 3017F COLORECTAL CA SCREEN DOC REV: CPT | Performed by: INTERNAL MEDICINE

## 2021-03-31 PROCEDURE — G8427 DOCREV CUR MEDS BY ELIG CLIN: HCPCS | Performed by: INTERNAL MEDICINE

## 2021-03-31 PROCEDURE — 99213 OFFICE O/P EST LOW 20 MIN: CPT | Performed by: INTERNAL MEDICINE

## 2021-03-31 PROCEDURE — 1101F PT FALLS ASSESS-DOCD LE1/YR: CPT | Performed by: INTERNAL MEDICINE

## 2021-03-31 PROCEDURE — G9717 DOC PT DX DEP/BP F/U NT REQ: HCPCS | Performed by: INTERNAL MEDICINE

## 2021-03-31 PROCEDURE — 1090F PRES/ABSN URINE INCON ASSESS: CPT | Performed by: INTERNAL MEDICINE

## 2021-03-31 PROCEDURE — G8752 SYS BP LESS 140: HCPCS | Performed by: INTERNAL MEDICINE

## 2021-03-31 PROCEDURE — G8754 DIAS BP LESS 90: HCPCS | Performed by: INTERNAL MEDICINE

## 2021-03-31 RX ORDER — MUPIROCIN 20 MG/G
OINTMENT TOPICAL DAILY
Qty: 30 G | Refills: 2 | Status: SHIPPED | OUTPATIENT
Start: 2021-03-31 | End: 2021-08-16 | Stop reason: SDUPTHER

## 2021-03-31 NOTE — PROGRESS NOTES
Subjective:     Is Holley Sandhoff is a 72-year-old female with history of an ischemic cardiomyopathy, chronic systolic congestive heart failure, chronic lower extremity edema who developed a large blistered lesion on her left anterior shin region approximately 3 weeks ago. The blister burst and she noted that the skin of the leg was red underneath the blister. Patient had previous cellulitis of the left lower leg and was treated with oral antibiotics after having been seen at urgent care. The wound has been treated with Bactroban ointment but is been slow to decrease in size. The patient denies any leg pain and has had no fevers, chills. Past Medical History:   Diagnosis Date    Anxiety 1/22/2018    Arthritis     OA    Asthma     Diabetes ([facility] [address]     Essential hypertension     GERD (gastroesophageal reflux disease)     Hypercholesterolemia 1/22/2018    Hypertension     Ill-defined condition     diverticulitis    Long-term use of high-risk medication 1/22/2018    Neuropathy     Obesity (BMI 30-39.9) 4/30/2019    Other ill-defined conditions(799.89)     IBS, spinal stenosis    Plantar fasciitis     Psychiatric disorder     depression, anxiety    Sleep apnea     uses CPAP    Type 2 diabetes mellitus with diabetic neuropathy, without long-term current use of insulin ([facility] [address] 6/5/2016     Past Surgical History:   Procedure Laterality Date    COLONOSCOPY N/A 3/14/2018    COL[facility] Giuliano Florentino [facility] END[facility]COPY    HX APPENDECTOMY      HX CHOLECYSTECTOMY  11/15/2018    HX HYSTERECTOMY      HX PACEMAKER      IR KYPHOPLASTY LUMBAR  12/22/2020    IR KYPHOPLASTY THORACIC  1/6/2021    [place] CARDIAC SURG PROCEDURE UNLIST      stent     Allergies   Allergen Reactions    Sulfa (Sulfonamide Antibiotics) Swelling    Amoxicillin Swelling     Current Outpatient Medications   Medication Sig Dispense Refill    mupirocin (BACTROBAN) 2 % ointment Apply  to affected area daily.  30 g 2    clonazePAM (KlonoPIN) 0.5 mg tablet TAKE 1 TABLET BY MOUTH NIGHTLY . DO NOT EXCEED  0.5  MG  PER  DAY 90 Tab 0    carvediloL (COREG) 25 mg tablet Take 25 mg by mouth two (2) times daily (with meals).  torsemide (DEMADEX) 20 mg tablet Take 20 mg by mouth daily.  ferrous sulfate 325 mg (65 mg iron) tablet Take  by mouth every other day.  cyclobenzaprine (FLEXERIL) 10 mg tablet TAKE 1 TABLET BY MOUTH THREE TIMES DAILY AS NEEDED FOR MUSCLE SPASM 90 Tab 0    hydrALAZINE (APRESOLINE) 50 mg tablet Take 1 Tab by mouth two (2) times a day. (Patient taking differently: Take 75 mg by mouth two (2) times a day.) 30 Tab 0    eucalyptus-peppermint oil (Ponaris) soln 1-2 Drops by Nasal route nightly as needed.  pregabalin (Lyrica) 150 mg capsule Take 150 mg by mouth two (2) times a day.  triamcinolone acetonide (KENALOG) 0.1 % topical cream APPLY A THIN FILM OF CREAM EXTERNALLY TO AFFECTED AREA TWICE DAILY 15 g 3    isosorbide mononitrate ER (IMDUR) 60 mg CR tablet Take 1 Tab by mouth daily. 90 Tab 3    magnesium oxide (MAG-OX) 400 mg tablet Take 1 tablet by mouth twice daily (Patient taking differently: daily. ) 180 Tab 3    omeprazole (PRILOSEC) 40 mg capsule TAKE 1 CAPSULE EVERY DAY 90 Cap 3    potassium chloride SR (KLOR-CON 10) 10 mEq tablet Take 1 Tab by mouth three (3) times daily. (Patient taking differently: Take 10 mEq by mouth four (4) times daily.) 540 Tab 3    SYMBICORT 160-4.5 mcg/actuation HFAA INHALE 2 PUFFS BY MOUTH TWICE DAILY 1 Inhaler 3    albuterol (PROVENTIL HFA, VENTOLIN HFA, PROAIR HFA) 90 mcg/actuation inhaler Take 1 Puff by inhalation every four (4) hours as needed for Wheezing. (Patient taking differently: Take 1 Puff by inhalation two (2) times daily as needed for Wheezing.) 1 Inhaler 6    diphenhydrAMINE (BENADRYL) 25 mg capsule Take 25 mg by mouth two (2) times a day.       traMADol (ULTRAM) 50 mg tablet Take 50 mg by mouth daily as needed for Pain (Used to supplement the Lyrica when lyrica doesnt manage the pain on its own).  dabigatran etexilate (PRADAXA) 150 mg capsule Take 1 Cap by mouth two (2) times a day. IF NO BLEEDING AT CATH SITE. 60 Cap 12    acetaminophen (TYLENOL EXTRA STRENGTH) 500 mg tablet Take 1,000 mg by mouth every eight (8) hours as needed for Pain (Headache).  lidocaine (ASPERCREME, LIDOCAINE,) 4 % topical cream Apply  to affected area daily as needed for Pain (Knee pain).  sodium chloride (AYR SALINE) 0.65 % nasal squeeze bottle 2 Sprays by Both Nostrils route every eight (8) hours as needed.  conjugated estrogens (PREMARIN) 0.625 mg/gram vaginal cream Insert 0.5 g into vagina two (2) times a week. Tuesdays and Thursdays       venlafaxine-SR (EFFEXOR XR) 150 mg capsule Take 150 mg by mouth two (2) times daily (with meals).  cholecalciferol (Vitamin D3) 25 mcg (1,000 unit) cap Take 1,000 Units by mouth daily.  aspirin 81 mg chewable tablet Take 81 mg by mouth daily.  atorvastatin (LIPITOR) 40 mg tablet Take 1 Tab by mouth nightly.  27 Tab 12     Social History     Socioeconomic History    Marital status:      Spouse name: Not on file    Number of children: Not on file    Years of education: Not on file    Highest education level: Not on file   Tobacco Use    Smoking status: Never Smoker    Smokeless tobacco: Never Used   Substance and Sexual Activity    Alcohol use: No    Drug use: No     Family History   Problem Relation Age of Onset    Arthritis-osteo Mother     Hypertension Mother     High Cholesterol Mother     Crohn's Disease Mother     Heart Disease Mother     Alcohol abuse Father     High Cholesterol Sister     Hypertension Sister     Thyroid Disease Sister     COPD Sister     High Cholesterol Brother     Hypertension Brother     COPD Brother     COPD Child     Inflammatory Bowel Dz Child        Review of Systems:  GEN: no weight loss, weight gain, fatigue or night sweats  CV: no PND, orthopnea, or palpitations  Resp: no dyspnea on exertion, no cough  Abd: no nausea, vomiting or diarrhea  EXT: Positive for nonhealing wound of left lower leg  Endocrine: no hair loss, excessive thirst or polyuria  Neurological ROS: no TIA or stroke symptoms  ROS otherwise negative      Objective:     Visit Vitals  /82 (BP 1 Location: Right upper arm, BP Patient Position: Sitting, BP Cuff Size: Large adult)   Pulse 80   Temp 97.9 °F (36.6 °C) (Oral)   Resp 20   Ht 5' 9\" (1.753 m)   Wt 262 lb (118.8 kg)   SpO2 97%   BMI 38.69 kg/m²     Body mass index is 38.69 kg/m². General:   alert, cooperative and no distress   Heart: S1 and S2 normal,no murmurs noted    Lungs: Clear to auscultation bilaterally, no increased work of breathing   Extremities: There is bilateral 2-3+ lower extremity edema. There is a 2-1/2 inch open wound in the left hansel lateral portion of the left lower leg which is red but without any surrounding redness or cellulitic rash. There is no purulent drainage. Neuro: ..alert, oriented x3,speech normal in context and clarity, cranial nerves II-XII intact,motor strength: full proximally and distally,gait: normal  reflexes: full and symmetric     Physical exam otherwise negative         Assessment/Plan:     Diagnoses and all orders for this visit:    Open wound of left lower leg, initial encounter  -     mupirocin (BACTROBAN) 2 % ointment; Apply  to affected area daily. , Normal, Disp-30 g, R-2        Other instructions: The patient has a nonhealing open wound of the left lower leg which is been present for 3 weeks after a blister burst.  She is already been treated with oral antibiotics and topical antibiotic ointment. The problem that she is having with her healing is her persistent edema. This is chronic and requires her to stay off of her feet as much as she can during the day with legs elevated in order to reduce this.   She does have home health coming out to the house but states that they are not treating this at the present time. The wound is very superficial and not deep and without any surrounding cellulitis. It would be best to treat this as a burn wound however she is allergic to sulfa drugs and we cannot use Silvadene cream to treat this. The wound was dressed with antibiotic ointment sterile Telfa and cohesive dressing. She will continue daily Bactroban applications and is is instructed to keep the wound dressed and dry. She should get off of her feet as much as possible and elevate her legs in order to reduce the edema and intratissue pressure that is present. Should she develop systemic symptoms such as fevers or chills she will call us for further directions. Follow-up in 3 weeks    Follow-up and Dispositions    · Return in about 3 weeks (around 4/21/2021). Edilma Barnes MD    Please note that this dictation was completed with [employer], the computer voice recognition software. Quite often unanticipated grammatical, syntax, homophones, and other interpretive errors are inadvertently transcribed by the computer software. Please disregard these errors. Please excuse any errors that have escaped final proofreading.

## 2021-03-31 NOTE — PROGRESS NOTES
Rolando Smith is a 78 y.o. female presenting for Leg Problem (L)  . 1. Have you been to the ER, urgent care clinic since your last visit? Hospitalized since your last visit? 3-17-21 [facility]    2. Have you seen or consulted any other health care providers outside of the [address] [place] [address] since your last visit? Include any pap smears or colon screening. No    Fall Risk Assessment, last 12 mths 8/13/2020   Able to walk? Yes   Fall in past 12 months? No   Number of falls in past 12 months -   Fall with injury? -         Abuse Screening Questionnaire 4/30/2020   Do you ever feel afraid of your partner? N   Are you in a relationship with someone who physically or mentally threatens you? N   Is it safe for you to go home? Y       3 most recent PHQ Screens 4/30/2020   Little interest or pleasure in doing things Not at all   Feeling down, depressed, irritable, or hopeless Not at all   Total Score PHQ 2 0   Trouble falling or staying asleep, or sleeping too much Not at all   Feeling tired or having little energy Not at all   Poor appetite, weight loss, or overeating Not at all   Feeling bad about yourself - or that you are a failure or have let yourself or your family down Not at all   Trouble concentrating on things such as school, work, reading, or watching TV Not at all   Moving or speaking so slowly that other people could have noticed; or the opposite being so fidgety that others notice Not at all   Thoughts of being better off dead, or hurting yourself in some way Not at all   PHQ 9 Score 0   How difficult have these problems made it for you to do your work, take care of your home and get along with others Not difficult at all       There are no discontinued medications.

## 2021-04-03 ENCOUNTER — IMMUNIZATION (OUTPATIENT)
Dept: FAMILY MEDICINE CLINIC | Age: 71
End: 2021-04-03
Payer: MEDICARE

## 2021-04-03 DIAGNOSIS — Z23 ENCOUNTER FOR IMMUNIZATION: Primary | ICD-10-CM

## 2021-04-03 PROCEDURE — 91300 COVID-19, MRNA, LNP-S, PF, 30MCG/0.3ML DOSE(PFIZER): CPT

## 2021-04-13 RX ORDER — TORSEMIDE 20 MG/1
20 TABLET ORAL DAILY
Qty: 90 TAB | Refills: 2 | Status: SHIPPED | OUTPATIENT
Start: 2021-04-13 | End: 2021-10-20

## 2021-04-13 RX ORDER — LANOLIN ALCOHOL/MO/W.PET/CERES
325 CREAM (GRAM) TOPICAL EVERY OTHER DAY
Qty: 90 TAB | Refills: 2 | Status: SHIPPED | OUTPATIENT
Start: 2021-04-13 | End: 2022-07-22

## 2021-04-13 RX ORDER — HYDRALAZINE HYDROCHLORIDE 50 MG/1
75 TABLET, FILM COATED ORAL 2 TIMES DAILY
Qty: 180 TAB | Refills: 2 | Status: ON HOLD | OUTPATIENT
Start: 2021-04-13 | End: 2021-05-20

## 2021-04-13 RX ORDER — CARVEDILOL 25 MG/1
25 TABLET ORAL 2 TIMES DAILY WITH MEALS
Qty: 180 TAB | Refills: 2 | Status: SHIPPED | OUTPATIENT
Start: 2021-04-13 | End: 2021-04-21 | Stop reason: ALTCHOICE

## 2021-04-13 NOTE — TELEPHONE ENCOUNTER
Requested Prescriptions     Pending Prescriptions Disp Refills    torsemide (DEMADEX) 20 mg tablet 90 Tab 3     Sig: Take 1 Tab by mouth daily.  ferrous sulfate 325 mg (65 mg iron) tablet 90 Tab 3     Sig: Take 1 Tab by mouth every other day.  carvediloL (COREG) 25 mg tablet 180 Tab 3     Sig: Take 1 Tab by mouth two (2) times daily (with meals).  hydrALAZINE (APRESOLINE) 50 mg tablet 180 Tab 3     Sig: Take 1.5 Tabs by mouth two (2) times a day.        Last Refill: 3/8/21  Next Appointment:6/14/21

## 2021-04-21 ENCOUNTER — OFFICE VISIT (OUTPATIENT)
Dept: INTERNAL MEDICINE CLINIC | Age: 71
End: 2021-04-21
Payer: MEDICARE

## 2021-04-21 VITALS
HEIGHT: 69 IN | HEART RATE: 80 BPM | TEMPERATURE: 98.1 F | RESPIRATION RATE: 20 BRPM | WEIGHT: 262 LBS | DIASTOLIC BLOOD PRESSURE: 80 MMHG | BODY MASS INDEX: 38.8 KG/M2 | SYSTOLIC BLOOD PRESSURE: 136 MMHG | OXYGEN SATURATION: 96 %

## 2021-04-21 DIAGNOSIS — R60.0 LOWER EXTREMITY EDEMA: Primary | ICD-10-CM

## 2021-04-21 DIAGNOSIS — I10 ESSENTIAL HYPERTENSION: Chronic | ICD-10-CM

## 2021-04-21 DIAGNOSIS — S81.802A OPEN WOUND OF LEFT LOWER LEG, INITIAL ENCOUNTER: ICD-10-CM

## 2021-04-21 PROCEDURE — G8399 PT W/DXA RESULTS DOCUMENT: HCPCS | Performed by: INTERNAL MEDICINE

## 2021-04-21 PROCEDURE — G9717 DOC PT DX DEP/BP F/U NT REQ: HCPCS | Performed by: INTERNAL MEDICINE

## 2021-04-21 PROCEDURE — G8752 SYS BP LESS 140: HCPCS | Performed by: INTERNAL MEDICINE

## 2021-04-21 PROCEDURE — G8427 DOCREV CUR MEDS BY ELIG CLIN: HCPCS | Performed by: INTERNAL MEDICINE

## 2021-04-21 PROCEDURE — 1101F PT FALLS ASSESS-DOCD LE1/YR: CPT | Performed by: INTERNAL MEDICINE

## 2021-04-21 PROCEDURE — 99213 OFFICE O/P EST LOW 20 MIN: CPT | Performed by: INTERNAL MEDICINE

## 2021-04-21 PROCEDURE — G8417 CALC BMI ABV UP PARAM F/U: HCPCS | Performed by: INTERNAL MEDICINE

## 2021-04-21 PROCEDURE — 3017F COLORECTAL CA SCREEN DOC REV: CPT | Performed by: INTERNAL MEDICINE

## 2021-04-21 PROCEDURE — 1090F PRES/ABSN URINE INCON ASSESS: CPT | Performed by: INTERNAL MEDICINE

## 2021-04-21 PROCEDURE — G8536 NO DOC ELDER MAL SCRN: HCPCS | Performed by: INTERNAL MEDICINE

## 2021-04-21 PROCEDURE — G8754 DIAS BP LESS 90: HCPCS | Performed by: INTERNAL MEDICINE

## 2021-04-21 RX ORDER — PANTOPRAZOLE SODIUM 40 MG/1
40 TABLET, DELAYED RELEASE ORAL DAILY
Qty: 30 TAB | Refills: 0 | Status: ON HOLD
Start: 2021-04-21 | End: 2021-10-06

## 2021-04-21 NOTE — PROGRESS NOTES
Subjective:     Mrs. Sindi Chiang presents to the office today in follow-up of her open wound of her left lower extremity due to her chronic lymphedema. She is been elevating her legs, applying Bactroban ointment and using compression stockings to keep the edema out of her legs. She has noted a dramatic improvement in the healing of this wound which is almost entirely clear. In addition she has been having some recent blood pressure elevations. She remains compliant with all medications. Past Medical History:   Diagnosis Date    Anxiety 1/22/2018    Arthritis     OA    Asthma     Diabetes ([facility] [address]     Essential hypertension     GERD (gastroesophageal reflux disease)     Hypercholesterolemia 1/22/2018    Hypertension     Ill-defined condition     diverticulitis    Long-term use of high-risk medication 1/22/2018    Neuropathy     Obesity (BMI 30-39.9) 4/30/2019    Other ill-defined conditions(799.89)     IBS, spinal stenosis    Plantar fasciitis     Psychiatric disorder     depression, anxiety    Sleep apnea     uses CPAP    Type 2 diabetes mellitus with diabetic neuropathy, without long-term current use of insulin ([facility] [address] 6/5/2016     Past Surgical History:   Procedure Laterality Date    COLONOSCOPY N/A 3/14/2018    COLON[facility] performed by [facility]ab[facility]in [facility] at [facility] ENDOSCOPY    HX APPENDECTOMY      HX CHOLECYSTECTOMY  11/15/2018    HX HYSTERECTOMY      HX PACEMAKER      IR KYPHOPLASTY LUMBAR  12/22/2020    IR KYPHOPLASTY THORACIC  1/6/2021    [place] CARDIAC SURG PROCEDURE UNLIST      stent     Allergies   Allergen Reactions    Sulfa (Sulfonamide Antibiotics) Swelling    Amoxicillin Swelling     Current Outpatient Medications   Medication Sig Dispense Refill    pantoprazole (PROTONIX) 40 mg tablet Take 1 Tab by mouth daily. 30 Tab 0    torsemide (DEMADEX) 20 mg tablet Take 1 Tab by mouth daily. 90 Tab 2    ferrous sulfate 325 mg (65 mg iron) tablet Take 1 Tab by mouth every other day.  90 Tab 2  hydrALAZINE (APRESOLINE) 50 mg tablet Take 1.5 Tabs by mouth two (2) times a day. 180 Tab 2    mupirocin (BACTROBAN) 2 % ointment Apply  to affected area daily. 30 g 2    clonazePAM (KlonoPIN) 0.5 mg tablet TAKE 1 TABLET BY MOUTH NIGHTLY . DO NOT EXCEED  0.5  MG  PER  DAY 90 Tab 0    cyclobenzaprine (FLEXERIL) 10 mg tablet TAKE 1 TABLET BY MOUTH THREE TIMES DAILY AS NEEDED FOR MUSCLE SPASM 90 Tab 0    eucalyptus-peppermint oil (Ponaris) soln 1-2 Drops by Nasal route nightly as needed.  pregabalin (Lyrica) 150 mg capsule Take 150 mg by mouth two (2) times a day.  triamcinolone acetonide (KENALOG) 0.1 % topical cream APPLY A THIN FILM OF CREAM EXTERNALLY TO AFFECTED AREA TWICE DAILY 15 g 3    isosorbide mononitrate ER (IMDUR) 60 mg CR tablet Take 1 Tab by mouth daily. 90 Tab 3    magnesium oxide (MAG-OX) 400 mg tablet Take 1 tablet by mouth twice daily (Patient taking differently: daily. ) 180 Tab 3    potassium chloride SR (KLOR-CON 10) 10 mEq tablet Take 1 Tab by mouth three (3) times daily. (Patient taking differently: Take 10 mEq by mouth four (4) times daily.) 540 Tab 3    SYMBICORT 160-4.5 mcg/actuation HFAA INHALE 2 PUFFS BY MOUTH TWICE DAILY 1 Inhaler 3    albuterol (PROVENTIL HFA, VENTOLIN HFA, PROAIR HFA) 90 mcg/actuation inhaler Take 1 Puff by inhalation every four (4) hours as needed for Wheezing. (Patient taking differently: Take 1 Puff by inhalation two (2) times daily as needed for Wheezing.) 1 Inhaler 6    dabigatran etexilate (PRADAXA) 150 mg capsule Take 1 Cap by mouth two (2) times a day. IF NO BLEEDING AT CATH SITE. 60 Cap 12    sodium chloride (AYR SALINE) 0.65 % nasal squeeze bottle 2 Sprays by Both Nostrils route every eight (8) hours as needed.  conjugated estrogens (PREMARIN) 0.625 mg/gram vaginal cream Insert 0.5 g into vagina two (2) times a week.  Tuesdays and Thursdays       venlafaxine-SR (EFFEXOR XR) 150 mg capsule Take 150 mg by mouth two (2) times daily (with meals).  cholecalciferol (Vitamin D3) 25 mcg (1,000 unit) cap Take 1,000 Units by mouth daily.  aspirin 81 mg chewable tablet Take 81 mg by mouth daily.  atorvastatin (LIPITOR) 40 mg tablet Take 1 Tab by mouth nightly. 27 Tab 12     Social History     Socioeconomic History    Marital status:      Spouse name: Not on file    Number of children: Not on file    Years of education: Not on file    Highest education level: Not on file   Tobacco Use    Smoking status: Never Smoker    Smokeless tobacco: Never Used   Substance and Sexual Activity    Alcohol use: No    Drug use: No     Family History   Problem Relation Age of Onset    Arthritis-osteo Mother     Hypertension Mother     High Cholesterol Mother     Crohn's Disease Mother     Heart Disease Mother     Alcohol abuse Father     High Cholesterol Sister     Hypertension Sister     Thyroid Disease Sister     COPD Sister     High Cholesterol Brother     Hypertension Brother     COPD Brother     COPD Child     Inflammatory Bowel Dz Child        Review of Systems:  GEN: no weight loss, weight gain, fatigue or night sweats  CV: no PND, orthopnea, or palpitations  Resp: no dyspnea on exertion, no cough  Abd: no nausea, vomiting or diarrhea  EXT: Positive for chronic lower extremity edema  Endocrine: no hair loss, excessive thirst or polyuria  Neurological ROS: no TIA or stroke symptoms  ROS otherwise negative      Objective:     Visit Vitals  /80   Pulse 80   Temp 98.1 °F (36.7 °C) (Oral)   Resp 20   Ht 5' 9\" (1.753 m)   Wt 262 lb (118.8 kg)   SpO2 96%   BMI 38.69 kg/m²     Body mass index is 38.69 kg/m². General:   alert, cooperative and no distress   Eyes: conjunctivae/sclerae clear.  PERRL, EOM's intact   Mouth:  No oral lesions, no pharyngeal erythema, no exudates   Neck: Trachea midline, no thyromegaly, no bruits   Heart: S1 and S2 normal,no murmurs noted    Lungs: Clear to auscultation bilaterally, no increased work of breathing   Abdomen: Soft, nontender. Normal bowel sounds   Extremities:  Edema of the legs is about 1+ today. The stasis ulcer of the left ankle has healed completely and there is no evidence of cellulitis. Neuro: ..alert, oriented x3,speech normal in context and clarity, cranial nerves II-XII intact,motor strength: full proximally and distally,gait: normal  reflexes: full and symmetric     Physical exam otherwise negative         Assessment/Plan:     Diagnoses and all orders for this visit:    Lower extremity edema    Open wound of left lower leg, initial encounter    Essential hypertension        Other instructions:   Patient's medications were reviewed and reconciled. Continued use of compression stockings    Leg elevation, compression stockings, salt restriction all recommended. Blood pressure seems adequately controlled on today's exam.    Follow-up in August as scheduled        Kwabena Law MD    Please note that this dictation was completed with [employer], the computer voice recognition software. Quite often unanticipated grammatical, syntax, homophones, and other interpretive errors are inadvertently transcribed by the computer software. Please disregard these errors. Please excuse any errors that have escaped final proofreading.

## 2021-04-21 NOTE — PROGRESS NOTES
Shannon Mann is a 78 y.o. female presenting for Leg Problem (3 week fu)  . 1. Have you been to the ER, urgent care clinic since your last visit? Hospitalized since your last visit? No    2. Have you seen or consulted any other health care providers outside of the [address] [place] [address] since your last visit? Include any pap smears or colon screening. Neurologist    Fall Risk Assessment, last 12 mths 8/13/2020   Able to walk? Yes   Fall in past 12 months? No   Number of falls in past 12 months -   Fall with injury? -         Abuse Screening Questionnaire 4/30/2020   Do you ever feel afraid of your partner? N   Are you in a relationship with someone who physically or mentally threatens you? N   Is it safe for you to go home? Y       3 most recent PHQ Screens 4/30/2020   Little interest or pleasure in doing things Not at all   Feeling down, depressed, irritable, or hopeless Not at all   Total Score PHQ 2 0   Trouble falling or staying asleep, or sleeping too much Not at all   Feeling tired or having little energy Not at all   Poor appetite, weight loss, or overeating Not at all   Feeling bad about yourself - or that you are a failure or have let yourself or your family down Not at all   Trouble concentrating on things such as school, work, reading, or watching TV Not at all   Moving or speaking so slowly that other people could have noticed; or the opposite being so fidgety that others notice Not at all   Thoughts of being better off dead, or hurting yourself in some way Not at all   PHQ 9 Score 0   How difficult have these problems made it for you to do your work, take care of your home and get along with others Not difficult at all       There are no discontinued medications.

## 2021-04-28 ENCOUNTER — TELEPHONE (OUTPATIENT)
Dept: INTERNAL MEDICINE CLINIC | Age: 71
End: 2021-04-28

## 2021-04-28 NOTE — TELEPHONE ENCOUNTER
[address] states patients blood pressure is good in the morning but climbs throughout the day. She states it has been anywhere from 160/100 - 190/100 around 11 am. She would like to adjust her hydralazine. Please advise.     Anselmo Rodriguez [phone] UIXOD

## 2021-05-19 ENCOUNTER — APPOINTMENT (OUTPATIENT)
Dept: CT IMAGING | Age: 71
DRG: 689 | End: 2021-05-19
Attending: EMERGENCY MEDICINE
Payer: MEDICARE

## 2021-05-19 ENCOUNTER — APPOINTMENT (OUTPATIENT)
Dept: GENERAL RADIOLOGY | Age: 71
DRG: 689 | End: 2021-05-19
Attending: EMERGENCY MEDICINE
Payer: MEDICARE

## 2021-05-19 ENCOUNTER — APPOINTMENT (OUTPATIENT)
Dept: ULTRASOUND IMAGING | Age: 71
DRG: 689 | End: 2021-05-19
Attending: STUDENT IN AN ORGANIZED HEALTH CARE EDUCATION/TRAINING PROGRAM
Payer: MEDICARE

## 2021-05-19 ENCOUNTER — HOSPITAL ENCOUNTER (INPATIENT)
Age: 71
LOS: 5 days | Discharge: HOME HEALTH | DRG: 689 | End: 2021-05-24
Attending: EMERGENCY MEDICINE | Admitting: STUDENT IN AN ORGANIZED HEALTH CARE EDUCATION/TRAINING PROGRAM
Payer: MEDICARE

## 2021-05-19 DIAGNOSIS — N30.00 ACUTE CYSTITIS WITHOUT HEMATURIA: Primary | ICD-10-CM

## 2021-05-19 DIAGNOSIS — G62.9 NEUROPATHY: ICD-10-CM

## 2021-05-19 DIAGNOSIS — Z79.899 POLYPHARMACY: ICD-10-CM

## 2021-05-19 DIAGNOSIS — G93.40 ENCEPHALOPATHY: ICD-10-CM

## 2021-05-19 DIAGNOSIS — E11.21 TYPE 2 DIABETES WITH NEPHROPATHY (HCC): ICD-10-CM

## 2021-05-19 LAB
ALBUMIN SERPL-MCNC: 3.1 G/DL (ref 3.5–5)
ALBUMIN/GLOB SERPL: 0.8 {RATIO} (ref 1.1–2.2)
ALP SERPL-CCNC: 185 U/L (ref 45–117)
ALT SERPL-CCNC: 27 U/L (ref 12–78)
AMMONIA PLAS-SCNC: 28 UMOL/L
ANION GAP SERPL CALC-SCNC: 7 MMOL/L (ref 5–15)
APPEARANCE UR: ABNORMAL
ARTERIAL PATENCY WRIST A: NEGATIVE
AST SERPL-CCNC: 30 U/L (ref 15–37)
ATRIAL RATE: 61 BPM
BACTERIA URNS QL MICRO: ABNORMAL /HPF
BASE EXCESS BLD CALC-SCNC: 1 MMOL/L
BASOPHILS # BLD: 0 K/UL (ref 0–0.1)
BASOPHILS NFR BLD: 0 % (ref 0–1)
BDY SITE: ABNORMAL
BILIRUB SERPL-MCNC: 1.2 MG/DL (ref 0.2–1)
BILIRUB UR QL: NEGATIVE
BUN SERPL-MCNC: 33 MG/DL (ref 6–20)
BUN/CREAT SERPL: 17 (ref 12–20)
CA-I BLD-SCNC: 1.1 MMOL/L (ref 1.12–1.32)
CALCIUM SERPL-MCNC: 9.4 MG/DL (ref 8.5–10.1)
CALCULATED R AXIS, ECG10: -90 DEGREES
CALCULATED T AXIS, ECG11: 81 DEGREES
CHLORIDE SERPL-SCNC: 108 MMOL/L (ref 97–108)
CO2 SERPL-SCNC: 27 MMOL/L (ref 21–32)
COLOR UR: ABNORMAL
CREAT SERPL-MCNC: 1.89 MG/DL (ref 0.55–1.02)
DIAGNOSIS, 93000: NORMAL
DIFFERENTIAL METHOD BLD: ABNORMAL
EOSINOPHIL # BLD: 0 K/UL (ref 0–0.4)
EOSINOPHIL NFR BLD: 0 % (ref 0–7)
EPITH CASTS URNS QL MICRO: ABNORMAL /LPF
ERYTHROCYTE [DISTWIDTH] IN BLOOD BY AUTOMATED COUNT: 16.5 % (ref 11.5–14.5)
EST. AVERAGE GLUCOSE BLD GHB EST-MCNC: 126 MG/DL
ETHANOL SERPL-MCNC: <10 MG/DL
GAS FLOW.O2 O2 DELIVERY SYS: ABNORMAL L/MIN
GLOBULIN SER CALC-MCNC: 3.9 G/DL (ref 2–4)
GLUCOSE BLD STRIP.AUTO-MCNC: 160 MG/DL (ref 65–117)
GLUCOSE BLD STRIP.AUTO-MCNC: 180 MG/DL (ref 65–117)
GLUCOSE BLD STRIP.AUTO-MCNC: 217 MG/DL (ref 65–117)
GLUCOSE SERPL-MCNC: 227 MG/DL (ref 65–100)
GLUCOSE UR STRIP.AUTO-MCNC: NEGATIVE MG/DL
HBA1C MFR BLD: 6 % (ref 4–5.6)
HCO3 BLD-SCNC: 24.6 MMOL/L (ref 22–26)
HCT VFR BLD AUTO: 36.7 % (ref 35–47)
HGB BLD-MCNC: 11.5 G/DL (ref 11.5–16)
HGB UR QL STRIP: ABNORMAL
HYALINE CASTS URNS QL MICRO: ABNORMAL /LPF (ref 0–5)
IMM GRANULOCYTES # BLD AUTO: 0.1 K/UL (ref 0–0.04)
IMM GRANULOCYTES NFR BLD AUTO: 1 % (ref 0–0.5)
KETONES UR QL STRIP.AUTO: NEGATIVE MG/DL
LACTATE SERPL-SCNC: 1.4 MMOL/L (ref 0.4–2)
LEUKOCYTE ESTERASE UR QL STRIP.AUTO: ABNORMAL
LYMPHOCYTES # BLD: 0.3 K/UL (ref 0.8–3.5)
LYMPHOCYTES NFR BLD: 3 % (ref 12–49)
MCH RBC QN AUTO: 28.6 PG (ref 26–34)
MCHC RBC AUTO-ENTMCNC: 31.3 G/DL (ref 30–36.5)
MCV RBC AUTO: 91.3 FL (ref 80–99)
MONOCYTES # BLD: 0.8 K/UL (ref 0–1)
MONOCYTES NFR BLD: 8 % (ref 5–13)
NEUTS SEG # BLD: 9.3 K/UL (ref 1.8–8)
NEUTS SEG NFR BLD: 88 % (ref 32–75)
NITRITE UR QL STRIP.AUTO: NEGATIVE
NRBC # BLD: 0 K/UL (ref 0–0.01)
NRBC BLD-RTO: 0 PER 100 WBC
PCO2 BLD: 34.6 MMHG (ref 35–45)
PH BLD: 7.46 [PH] (ref 7.35–7.45)
PH UR STRIP: 6 [PH] (ref 5–8)
PLATELET # BLD AUTO: 139 K/UL (ref 150–400)
PMV BLD AUTO: 12.1 FL (ref 8.9–12.9)
PO2 BLD: 70 MMHG (ref 80–100)
POTASSIUM SERPL-SCNC: 3.3 MMOL/L (ref 3.5–5.1)
PROT SERPL-MCNC: 7 G/DL (ref 6.4–8.2)
PROT UR STRIP-MCNC: 300 MG/DL
Q-T INTERVAL, ECG07: 440 MS
QRS DURATION, ECG06: 152 MS
QTC CALCULATION (BEZET), ECG08: 511 MS
RBC # BLD AUTO: 4.02 M/UL (ref 3.8–5.2)
RBC #/AREA URNS HPF: >100 /HPF (ref 0–5)
SAO2 % BLD: 94.8 % (ref 92–97)
SERVICE CMNT-IMP: ABNORMAL
SODIUM SERPL-SCNC: 142 MMOL/L (ref 136–145)
SP GR UR REFRACTOMETRY: 1.02 (ref 1–1.03)
SPECIMEN TYPE: ABNORMAL
TROPONIN I BLD-MCNC: 0.09 NG/ML (ref 0–0.08)
TROPONIN I BLD-MCNC: 0.1 NG/ML (ref 0–0.08)
TROPONIN I BLD-MCNC: 0.14 NG/ML (ref 0–0.08)
TROPONIN I SERPL-MCNC: 0.2 NG/ML
UA: UC IF INDICATED,UAUC: ABNORMAL
UROBILINOGEN UR QL STRIP.AUTO: 0.2 EU/DL (ref 0.2–1)
VENTRICULAR RATE, ECG03: 81 BPM
WBC # BLD AUTO: 10.5 K/UL (ref 3.6–11)
WBC URNS QL MICRO: >100 /HPF (ref 0–4)

## 2021-05-19 PROCEDURE — 36415 COLL VENOUS BLD VENIPUNCTURE: CPT

## 2021-05-19 PROCEDURE — 74011250636 HC RX REV CODE- 250/636: Performed by: EMERGENCY MEDICINE

## 2021-05-19 PROCEDURE — 74176 CT ABD & PELVIS W/O CONTRAST: CPT

## 2021-05-19 PROCEDURE — 76700 US EXAM ABDOM COMPLETE: CPT

## 2021-05-19 PROCEDURE — 93005 ELECTROCARDIOGRAM TRACING: CPT

## 2021-05-19 PROCEDURE — 82140 ASSAY OF AMMONIA: CPT

## 2021-05-19 PROCEDURE — 74011250636 HC RX REV CODE- 250/636: Performed by: STUDENT IN AN ORGANIZED HEALTH CARE EDUCATION/TRAINING PROGRAM

## 2021-05-19 PROCEDURE — 71045 X-RAY EXAM CHEST 1 VIEW: CPT

## 2021-05-19 PROCEDURE — 82962 GLUCOSE BLOOD TEST: CPT

## 2021-05-19 PROCEDURE — 2709999900 HC NON-CHARGEABLE SUPPLY

## 2021-05-19 PROCEDURE — 80053 COMPREHEN METABOLIC PANEL: CPT

## 2021-05-19 PROCEDURE — 74011636637 HC RX REV CODE- 636/637: Performed by: STUDENT IN AN ORGANIZED HEALTH CARE EDUCATION/TRAINING PROGRAM

## 2021-05-19 PROCEDURE — 87086 URINE CULTURE/COLONY COUNT: CPT

## 2021-05-19 PROCEDURE — 74011250636 HC RX REV CODE- 250/636: Performed by: NURSE PRACTITIONER

## 2021-05-19 PROCEDURE — 87040 BLOOD CULTURE FOR BACTERIA: CPT

## 2021-05-19 PROCEDURE — 81001 URINALYSIS AUTO W/SCOPE: CPT

## 2021-05-19 PROCEDURE — 83605 ASSAY OF LACTIC ACID: CPT

## 2021-05-19 PROCEDURE — 85025 COMPLETE CBC W/AUTO DIFF WBC: CPT

## 2021-05-19 PROCEDURE — 87077 CULTURE AEROBIC IDENTIFY: CPT

## 2021-05-19 PROCEDURE — 82803 BLOOD GASES ANY COMBINATION: CPT

## 2021-05-19 PROCEDURE — 99218 HC RM OBSERVATION: CPT

## 2021-05-19 PROCEDURE — 74011000258 HC RX REV CODE- 258: Performed by: EMERGENCY MEDICINE

## 2021-05-19 PROCEDURE — 74011250637 HC RX REV CODE- 250/637: Performed by: STUDENT IN AN ORGANIZED HEALTH CARE EDUCATION/TRAINING PROGRAM

## 2021-05-19 PROCEDURE — 84484 ASSAY OF TROPONIN QUANT: CPT

## 2021-05-19 PROCEDURE — 87186 SC STD MICRODIL/AGAR DIL: CPT

## 2021-05-19 PROCEDURE — 65660000000 HC RM CCU STEPDOWN

## 2021-05-19 PROCEDURE — P9047 ALBUMIN (HUMAN), 25%, 50ML: HCPCS | Performed by: NURSE PRACTITIONER

## 2021-05-19 PROCEDURE — 83036 HEMOGLOBIN GLYCOSYLATED A1C: CPT

## 2021-05-19 PROCEDURE — 36600 WITHDRAWAL OF ARTERIAL BLOOD: CPT

## 2021-05-19 PROCEDURE — 70450 CT HEAD/BRAIN W/O DYE: CPT

## 2021-05-19 PROCEDURE — 82077 ASSAY SPEC XCP UR&BREATH IA: CPT

## 2021-05-19 PROCEDURE — 99285 EMERGENCY DEPT VISIT HI MDM: CPT

## 2021-05-19 RX ORDER — IPRATROPIUM BROMIDE AND ALBUTEROL SULFATE 2.5; .5 MG/3ML; MG/3ML
3 SOLUTION RESPIRATORY (INHALATION)
Status: DISCONTINUED | OUTPATIENT
Start: 2021-05-19 | End: 2021-05-24 | Stop reason: HOSPADM

## 2021-05-19 RX ORDER — POTASSIUM CHLORIDE 20 MEQ/1
40 TABLET, EXTENDED RELEASE ORAL
Status: DISCONTINUED | OUTPATIENT
Start: 2021-05-19 | End: 2021-05-19

## 2021-05-19 RX ORDER — SODIUM CHLORIDE, SODIUM LACTATE, POTASSIUM CHLORIDE, CALCIUM CHLORIDE 600; 310; 30; 20 MG/100ML; MG/100ML; MG/100ML; MG/100ML
50 INJECTION, SOLUTION INTRAVENOUS CONTINUOUS
Status: DISCONTINUED | OUTPATIENT
Start: 2021-05-19 | End: 2021-05-21

## 2021-05-19 RX ORDER — POLYETHYLENE GLYCOL 3350 17 G/17G
17 POWDER, FOR SOLUTION ORAL DAILY PRN
Status: DISCONTINUED | OUTPATIENT
Start: 2021-05-19 | End: 2021-05-24 | Stop reason: HOSPADM

## 2021-05-19 RX ORDER — PANTOPRAZOLE SODIUM 40 MG/1
40 TABLET, DELAYED RELEASE ORAL
Status: DISCONTINUED | OUTPATIENT
Start: 2021-05-20 | End: 2021-05-24 | Stop reason: HOSPADM

## 2021-05-19 RX ORDER — GUAIFENESIN 100 MG/5ML
81 LIQUID (ML) ORAL DAILY
Status: DISCONTINUED | OUTPATIENT
Start: 2021-05-20 | End: 2021-05-24 | Stop reason: HOSPADM

## 2021-05-19 RX ORDER — ACETAMINOPHEN 650 MG/1
650 SUPPOSITORY RECTAL
Status: DISCONTINUED | OUTPATIENT
Start: 2021-05-19 | End: 2021-05-24 | Stop reason: HOSPADM

## 2021-05-19 RX ORDER — MAGNESIUM SULFATE 100 %
4 CRYSTALS MISCELLANEOUS AS NEEDED
Status: DISCONTINUED | OUTPATIENT
Start: 2021-05-19 | End: 2021-05-24 | Stop reason: HOSPADM

## 2021-05-19 RX ORDER — CARVEDILOL 12.5 MG/1
25 TABLET ORAL 2 TIMES DAILY WITH MEALS
Status: DISCONTINUED | OUTPATIENT
Start: 2021-05-19 | End: 2021-05-24 | Stop reason: HOSPADM

## 2021-05-19 RX ORDER — ACETAMINOPHEN 325 MG/1
650 TABLET ORAL
Status: DISCONTINUED | OUTPATIENT
Start: 2021-05-19 | End: 2021-05-24 | Stop reason: HOSPADM

## 2021-05-19 RX ORDER — ENOXAPARIN SODIUM 100 MG/ML
40 INJECTION SUBCUTANEOUS EVERY 24 HOURS
Status: DISCONTINUED | OUTPATIENT
Start: 2021-05-19 | End: 2021-05-20

## 2021-05-19 RX ORDER — POTASSIUM CHLORIDE 7.45 MG/ML
10 INJECTION INTRAVENOUS ONCE
Status: COMPLETED | OUTPATIENT
Start: 2021-05-19 | End: 2021-05-19

## 2021-05-19 RX ORDER — INSULIN LISPRO 100 [IU]/ML
INJECTION, SOLUTION INTRAVENOUS; SUBCUTANEOUS
Status: DISCONTINUED | OUTPATIENT
Start: 2021-05-19 | End: 2021-05-24 | Stop reason: HOSPADM

## 2021-05-19 RX ORDER — ONDANSETRON 2 MG/ML
4 INJECTION INTRAMUSCULAR; INTRAVENOUS
Status: DISCONTINUED | OUTPATIENT
Start: 2021-05-19 | End: 2021-05-24 | Stop reason: HOSPADM

## 2021-05-19 RX ORDER — SODIUM CHLORIDE 0.9 % (FLUSH) 0.9 %
5-40 SYRINGE (ML) INJECTION AS NEEDED
Status: DISCONTINUED | OUTPATIENT
Start: 2021-05-19 | End: 2021-05-24 | Stop reason: HOSPADM

## 2021-05-19 RX ORDER — DABIGATRAN ETEXILATE 150 MG/1
150 CAPSULE ORAL 2 TIMES DAILY
Status: DISCONTINUED | OUTPATIENT
Start: 2021-05-19 | End: 2021-05-19

## 2021-05-19 RX ORDER — ATORVASTATIN CALCIUM 40 MG/1
40 TABLET, FILM COATED ORAL
Status: DISCONTINUED | OUTPATIENT
Start: 2021-05-19 | End: 2021-05-24 | Stop reason: HOSPADM

## 2021-05-19 RX ORDER — SODIUM CHLORIDE 0.9 % (FLUSH) 0.9 %
5-40 SYRINGE (ML) INJECTION EVERY 8 HOURS
Status: DISCONTINUED | OUTPATIENT
Start: 2021-05-19 | End: 2021-05-24 | Stop reason: HOSPADM

## 2021-05-19 RX ORDER — ISOSORBIDE MONONITRATE 30 MG/1
60 TABLET, EXTENDED RELEASE ORAL DAILY
Status: DISCONTINUED | OUTPATIENT
Start: 2021-05-19 | End: 2021-05-24 | Stop reason: HOSPADM

## 2021-05-19 RX ORDER — PROMETHAZINE HYDROCHLORIDE 25 MG/1
12.5 TABLET ORAL
Status: DISCONTINUED | OUTPATIENT
Start: 2021-05-19 | End: 2021-05-24 | Stop reason: HOSPADM

## 2021-05-19 RX ORDER — HYDRALAZINE HYDROCHLORIDE 50 MG/1
50 TABLET, FILM COATED ORAL 3 TIMES DAILY
Status: DISCONTINUED | OUTPATIENT
Start: 2021-05-19 | End: 2021-05-24 | Stop reason: HOSPADM

## 2021-05-19 RX ORDER — ALBUMIN HUMAN 250 G/1000ML
25 SOLUTION INTRAVENOUS ONCE
Status: COMPLETED | OUTPATIENT
Start: 2021-05-19 | End: 2021-05-19

## 2021-05-19 RX ORDER — DEXTROSE 50 % IN WATER (D50W) INTRAVENOUS SYRINGE
25-50 AS NEEDED
Status: DISCONTINUED | OUTPATIENT
Start: 2021-05-19 | End: 2021-05-24 | Stop reason: HOSPADM

## 2021-05-19 RX ADMIN — SODIUM CHLORIDE, POTASSIUM CHLORIDE, SODIUM LACTATE AND CALCIUM CHLORIDE 50 ML/HR: 600; 310; 30; 20 INJECTION, SOLUTION INTRAVENOUS at 16:28

## 2021-05-19 RX ADMIN — CARVEDILOL 25 MG: 12.5 TABLET, FILM COATED ORAL at 15:51

## 2021-05-19 RX ADMIN — ENOXAPARIN SODIUM 40 MG: 40 INJECTION SUBCUTANEOUS at 21:49

## 2021-05-19 RX ADMIN — HYDRALAZINE HYDROCHLORIDE 50 MG: 50 TABLET, FILM COATED ORAL at 21:49

## 2021-05-19 RX ADMIN — CEFTRIAXONE SODIUM 2 G: 2 INJECTION, POWDER, FOR SOLUTION INTRAMUSCULAR; INTRAVENOUS at 18:03

## 2021-05-19 RX ADMIN — ALBUMIN (HUMAN) 25 G: 0.25 INJECTION, SOLUTION INTRAVENOUS at 20:48

## 2021-05-19 RX ADMIN — ATORVASTATIN CALCIUM 40 MG: 40 TABLET, FILM COATED ORAL at 21:49

## 2021-05-19 RX ADMIN — HYDRALAZINE HYDROCHLORIDE 50 MG: 50 TABLET, FILM COATED ORAL at 18:49

## 2021-05-19 RX ADMIN — HYDRALAZINE HYDROCHLORIDE 50 MG: 50 TABLET, FILM COATED ORAL at 15:51

## 2021-05-19 RX ADMIN — Medication 10 ML: at 15:52

## 2021-05-19 RX ADMIN — ACETAMINOPHEN 650 MG: 325 TABLET ORAL at 15:51

## 2021-05-19 RX ADMIN — ISOSORBIDE MONONITRATE 60 MG: 30 TABLET, EXTENDED RELEASE ORAL at 15:51

## 2021-05-19 RX ADMIN — Medication 10 ML: at 21:49

## 2021-05-19 RX ADMIN — POTASSIUM CHLORIDE 10 MEQ: 7.46 INJECTION, SOLUTION INTRAVENOUS at 14:08

## 2021-05-19 NOTE — ED NOTES
TRANSFER - OUT REPORT:    Verbal report given to Lorena(name) on Dharmesh Yanez  being transferred to [facility]) for routine progression of care       Report consisted of patients Situation, Background, Assessment and   Recommendations(SBAR). Information from the following report(s) SBAR, ED Summary and MAR was reviewed with the receiving nurse. Lines:       Opportunity for questions and clarification was provided.

## 2021-05-19 NOTE — ED PROVIDER NOTES
EMERGENCY DEPARTMENT HISTORY AND PHYSICAL EXAM      Date: 5/19/2021  Patient Name: George Kim    History of Presenting Illness     Chief Complaint   Patient presents with    Fall     Patient fell sometime this morning and  found her sitting on the floor with her back against the bed. Complains of back pain. Cannot sit in a chair per EMS. Was orthostatic at home.  Altered mental status     Not oriented in triage - can only speak in short sentences and is not answering all questions appropriately. History Provided By: Patient and EMS    HPI: George Kim, 78 y.o. female with history of hypertension, asthma, GERD, type 2 diabetes, anxiety presents to the ED with cc of weakness, fall, altered mental status. Per EMS patient has been having progressively worsening weakness and recurrent falls over the past week. She tried to get out of bed this morning and reports fall x2, she is not sure if she struck her head.  found her sitting on the floor next to the bed. She appears altered compared to baseline and is speaking with slurred speech and not responding completely appropriately to questions. She denies alcohol or recent drug use. Denies fevers, chills, recent illness. Per EMS patient has had increased weakness and has had decreased ability to ambulate around her home with use of Rollator. She does endorse lower abdominal pain as well as dysuria, urgency, frequency. Denies any chest pain, shortness of breath. Additionally endorses low back pain which is a chronic condition for her without acute changes. There are no other complaints, changes, or physical findings at this time. PCP: Tyshawn Pizano MD    No current facility-administered medications on file prior to encounter. Current Outpatient Medications on File Prior to Encounter   Medication Sig Dispense Refill    pantoprazole (PROTONIX) 40 mg tablet Take 1 Tab by mouth daily.  30 Tab 0    torsemide (DEMADEX) 20 mg tablet Take 1 Tab by mouth daily. 90 Tab 2    ferrous sulfate 325 mg (65 mg iron) tablet Take 1 Tab by mouth every other day. 90 Tab 2    hydrALAZINE (APRESOLINE) 50 mg tablet Take 1.5 Tabs by mouth two (2) times a day. (Patient taking differently: Take 75 mg by mouth three (3) times daily. ) 180 Tab 2    mupirocin (BACTROBAN) 2 % ointment Apply  to affected area daily. 30 g 2    clonazePAM (KlonoPIN) 0.5 mg tablet TAKE 1 TABLET BY MOUTH NIGHTLY . DO NOT EXCEED  0.5  MG  PER  DAY 90 Tab 0    cyclobenzaprine (FLEXERIL) 10 mg tablet TAKE 1 TABLET BY MOUTH THREE TIMES DAILY AS NEEDED FOR MUSCLE SPASM 90 Tab 0    eucalyptus-peppermint oil (Ponaris) soln 1-2 Drops by Nasal route nightly as needed.  pregabalin (Lyrica) 150 mg capsule Take 150 mg by mouth two (2) times a day.  triamcinolone acetonide (KENALOG) 0.1 % topical cream APPLY A THIN FILM OF CREAM EXTERNALLY TO AFFECTED AREA TWICE DAILY 15 g 3    isosorbide mononitrate ER (IMDUR) 60 mg CR tablet Take 1 Tab by mouth daily. 90 Tab 3    magnesium oxide (MAG-OX) 400 mg tablet Take 1 tablet by mouth twice daily (Patient taking differently: daily. ) 180 Tab 3    potassium chloride SR (KLOR-CON 10) 10 mEq tablet Take 1 Tab by mouth three (3) times daily. (Patient taking differently: Take 10 mEq by mouth four (4) times daily.) 540 Tab 3    SYMBICORT 160-4.5 mcg/actuation HFAA INHALE 2 PUFFS BY MOUTH TWICE DAILY 1 Inhaler 3    albuterol (PROVENTIL HFA, VENTOLIN HFA, PROAIR HFA) 90 mcg/actuation inhaler Take 1 Puff by inhalation every four (4) hours as needed for Wheezing. (Patient taking differently: Take 1 Puff by inhalation two (2) times daily as needed for Wheezing.) 1 Inhaler 6    dabigatran etexilate (PRADAXA) 150 mg capsule Take 1 Cap by mouth two (2) times a day. IF NO BLEEDING AT CATH SITE. 60 Cap 12    sodium chloride (AYR SALINE) 0.65 % nasal squeeze bottle 2 Sprays by Both Nostrils route every eight (8) hours as needed.       conjugated estrogens (PREMARIN) 0.625 mg/gram vaginal cream Insert 0.5 g into vagina two (2) times a week. Tuesdays and Thursdays       venlafaxine-SR (EFFEXOR XR) 150 mg capsule Take 150 mg by mouth two (2) times daily (with meals).  cholecalciferol (Vitamin D3) 25 mcg (1,000 unit) cap Take 1,000 Units by mouth daily.  aspirin 81 mg chewable tablet Take 81 mg by mouth daily.  atorvastatin (LIPITOR) 40 mg tablet Take 1 Tab by mouth nightly.  27 Tab 12       Past History     Past Medical History:  Past Medical History:   Diagnosis Date    Anxiety 1/22/2018    Arthritis     OA    Asthma     Diabetes ([facility] [address] [facility]     Essential hypertension     GERD (gastroesophageal reflux disease)     Hypercholesterolemia 1/22/2018    Hypertension     Ill-defined condition     diverticulitis    Long-term use of high-risk medication 1/22/2018    Neuropathy     Obesity (BMI 30-39.9) 4/30/2019    Other ill-defined conditions(799.89)     IBS, spinal stenosis    Plantar fasciitis     Psychiatric disorder     depression, anxiety    Sleep apnea     uses CPAP    Type 2 diabetes mellitus with diabetic neuropathy, without long-term current use of insulin ([facility] [address] 6/5/2016       Past Surgical History:  Past Surgical History:   Procedure Laterality Date    COLONOSCOPY N/A 3/14/2018    COLONOSCOPY performed by Abeba Obando [facility] at [facility] ENDOSCOPY    HX APPENDECTOMY      HX CHOLECYSTECTOMY  11/15/2018    HX HYSTERECTOMY      HX PACEMAKER      IR KYPHOPLASTY LUMBAR  12/22/2020    IR KYPHOPLASTY THORACIC  1/6/2021    FL CARDIAC SURG PROCEDURE UNLIST      stent       Family History:  Family History   Problem Relation Age of Onset   [facility] Arthritis-osteo Mother     Hypertension Mother     High Cholesterol Mother     Crohn's Disease Mother     Heart Disease Mother     Alcohol abuse Father     High Cholesterol Sister     Hypertension Sister     Thyroid Disease Sister     COPD Sister     High Cholesterol Brother     Hypertension Brother     COPD Brother     COPD Child     Inflammatory Bowel Dz Child        Social History:  Social History     Tobacco Use    Smoking status: Never Smoker    Smokeless tobacco: Never Used   Vaping Use    Vaping Use: Never used   Substance Use Topics    Alcohol use: No    Drug use: No       Allergies: Allergies   Allergen Reactions    Sulfa (Sulfonamide Antibiotics) Swelling    Amoxicillin Swelling         Review of Systems   Review of Systems   Constitutional: Positive for fatigue. Negative for chills and fever. Eyes: Negative for visual disturbance. Respiratory: Negative for cough and shortness of breath. Cardiovascular: Negative for chest pain and leg swelling. Gastrointestinal: Positive for abdominal pain. Negative for nausea and vomiting. Genitourinary: Positive for dysuria and urgency. Musculoskeletal: Positive for gait problem. Negative for back pain. Skin: Negative for color change and rash. Neurological: Positive for speech difficulty. Negative for dizziness, weakness, light-headedness, numbness and headaches. Hematological: Bruises/bleeds easily. All other systems reviewed and are negative. Physical Exam   Physical Exam  Vitals and nursing note reviewed. Constitutional:       General: She is not in acute distress. Appearance: Normal appearance. She is obese. She is not ill-appearing or toxic-appearing. Comments: Obese female lying on bed in no acute distress. Slurred speech. Responds appropriately to questions but does mumble frequently. HENT:      Head: Normocephalic and atraumatic. Nose: Nose normal.      Mouth/Throat:      Mouth: Mucous membranes are moist.   Eyes:      Extraocular Movements: Extraocular movements intact. Pupils: Pupils are equal, round, and reactive to light. Cardiovascular:      Rate and Rhythm: Normal rate and regular rhythm. Heart sounds: No murmur heard.      Pulmonary:      Effort: Pulmonary effort is normal. No respiratory distress. Breath sounds: Normal breath sounds. No wheezing. Abdominal:      General: There is no distension. Palpations: Abdomen is soft. Tenderness: There is no abdominal tenderness. There is no guarding or rebound. Musculoskeletal:         General: No swelling or tenderness. Normal range of motion. Cervical back: Normal range of motion and neck supple. No rigidity or tenderness. Right lower leg: No edema. Left lower leg: No edema. Skin:     General: Skin is warm and dry. Coloration: Skin is not pale. Findings: No erythema. Neurological:      General: No focal deficit present. Mental Status: She is alert. Comments: Moves all extremities, slurred speech present. Diagnostic Study Results     Labs -     Recent Results (from the past 12 hour(s))   EKG, 12 LEAD, INITIAL    Collection Time: 05/19/21 10:00 AM   Result Value Ref Range    Ventricular Rate 81 BPM    Atrial Rate 61 BPM    QRS Duration 152 ms    Q-T Interval 440 ms    QTC Calculation (Bezet) 511 ms    Calculated R Axis -90 degrees    Calculated T Axis 81 degrees    Diagnosis       Ventricular-paced rhythm  When compared with ECG of 01-JAN-2021 10:49,  No significant change was found     CBC WITH AUTOMATED DIFF    Collection Time: 05/19/21 10:05 AM   Result Value Ref Range    WBC 10.5 3.6 - 11.0 K/uL    RBC 4.02 3.80 - 5.20 M/uL    HGB 11.5 11.5 - 16.0 g/dL    HCT 36.7 35.0 - 47.0 %    MCV 91.3 80.0 - 99.0 FL    MCH 28.6 26.0 - 34.0 PG    MCHC 31.3 30.0 - 36.5 g/dL    RDW 16.5 (H) 11.5 - 14.5 %    PLATELET 233 (L) 038 - 400 K/uL    MPV 12.1 8.9 - 12.9 FL    NRBC 0.0 0  WBC    ABSOLUTE NRBC 0.00 0.00 - 0.01 K/uL    NEUTROPHILS 88 (H) 32 - 75 %    LYMPHOCYTES 3 (L) 12 - 49 %    MONOCYTES 8 5 - 13 %    EOSINOPHILS 0 0 - 7 %    BASOPHILS 0 0 - 1 %    IMMATURE GRANULOCYTES 1 (H) 0.0 - 0.5 %    ABS. NEUTROPHILS 9.3 (H) 1.8 - 8.0 K/UL    ABS.  LYMPHOCYTES 0.3 (L) 0.8 - 3.5 K/UL    ABS. MONOCYTES 0.8 0.0 - 1.0 K/UL    ABS. EOSINOPHILS 0.0 0.0 - 0.4 K/UL    ABS. BASOPHILS 0.0 0.0 - 0.1 K/UL    ABS. IMM. GRANS. 0.1 (H) 0.00 - 0.04 K/UL    DF AUTOMATED     METABOLIC PANEL, COMPREHENSIVE    Collection Time: 05/19/21 10:05 AM   Result Value Ref Range    Sodium 142 136 - 145 mmol/L    Potassium 3.3 (L) 3.5 - 5.1 mmol/L    Chloride 108 97 - 108 mmol/L    CO2 27 21 - 32 mmol/L    Anion gap 7 5 - 15 mmol/L    Glucose 227 (H) 65 - 100 mg/dL    BUN 33 (H) 6 - 20 MG/DL    Creatinine 1.89 (H) 0.55 - 1.02 MG/DL    BUN/Creatinine ratio 17 12 - 20      GFR est AA 32 (L) >60 ml/min/1.73m2    GFR est non-AA 26 (L) >60 ml/min/1.73m2    Calcium 9.4 8.5 - 10.1 MG/DL    Bilirubin, total 1.2 (H) 0.2 - 1.0 MG/DL    ALT (SGPT) 27 12 - 78 U/L    AST (SGOT) 30 15 - 37 U/L    Alk.  phosphatase 185 (H) 45 - 117 U/L    Protein, total 7.0 6.4 - 8.2 g/dL    Albumin 3.1 (L) 3.5 - 5.0 g/dL    Globulin 3.9 2.0 - 4.0 g/dL    A-G Ratio 0.8 (L) 1.1 - 2.2     ETHYL ALCOHOL    Collection Time: 05/19/21 10:05 AM   Result Value Ref Range    ALCOHOL(ETHYL),SERUM <10 <10 MG/DL   POC TROPONIN-I    Collection Time: 05/19/21 10:22 AM   Result Value Ref Range    Troponin-I (POC) 0.10 (H) 0.00 - 0.08 ng/mL   GLUCOSE, POC    Collection Time: 05/19/21 10:25 AM   Result Value Ref Range    Glucose (POC) 217 (H) 65 - 117 mg/dL    Performed by Elizabeth Muhammad RN ([email or web address])    URINALYSIS W/ REFLEX CULTURE    Collection Time: 05/19/21 10:52 AM    Specimen: Urine   Result Value Ref Range    Color YELLOW/STRAW      Appearance TURBID (A) CLEAR      Specific gravity 1.016 1.003 - 1.030      pH (UA) 6.0 5.0 - 8.0      Protein 300 (A) NEG mg/dL    Glucose Negative NEG mg/dL    Ketone Negative NEG mg/dL    Bilirubin Negative NEG      Blood LARGE (A) NEG      Urobilinogen 0.2 0.2 - 1.0 EU/dL    Nitrites Negative NEG      Leukocyte Esterase LARGE (A) NEG      WBC >100 (H) 0 - 4 /hpf    RBC >100 (H) 0 - 5 /hpf    Epithelial cells FEW FEW /lpf    Bacteria 4+ (A) NEG /hpf    UA:UC IF INDICATED URINE CULTURE ORDERED (A) CNI      Hyaline cast 2-5 0 - 5 /lpf   POC TROPONIN-I    Collection Time: 05/19/21 11:03 AM   Result Value Ref Range    Troponin-I (POC) 0.09 (H) 0.00 - 0.08 ng/mL   POC TROPONIN-I    Collection Time: 05/19/21 12:41 PM   Result Value Ref Range    Troponin-I (POC) 0.14 (H) 0.00 - 0.08 ng/mL   POC EG7    Collection Time: 05/19/21  2:36 PM   Result Value Ref Range    Calcium, ionized (POC) 1.10 (L) 1.12 - 1.32 mmol/L    pH (POC) 7.46 (H) 7.35 - 7.45      pCO2 (POC) 34.6 (L) 35.0 - 45.0 MMHG    pO2 (POC) 70 (L) 80 - 100 MMHG    HCO3 (POC) 24.6 22 - 26 MMOL/L    Base excess (POC) 1.0 mmol/L    sO2 (POC) 94.8 92 - 97 %    Site LEFT RADIAL      Device: ROOM AIR      Allens test (POC) Negative      Specimen type (POC) ARTERIAL         Radiologic Studies -   CT ABD PELV WO CONT   Final Result   Bilateral nonobstructing renal stones. No ureteral stone or hydronephrosis. No   acute abnormality. XR CHEST PORT   Final Result   1. There is cardiomegaly and mild pulmonary vascular congestion as evidenced by   increased size of vessels in the upper lung zones. No pneumonia or pulmonary   edema. CT HEAD WO CONT   Final Result   No acute process or change compared to the prior exam.            US ABD COMP    (Results Pending)     CT Results  (Last 48 hours)               05/19/21 1125  CT ABD PELV WO CONT Final result    Impression:  Bilateral nonobstructing renal stones. No ureteral stone or hydronephrosis. No   acute abnormality. Narrative:  EXAM: CT ABD PELV WO CONT       INDICATION: lower abd pain, recurrent falls, weakness       COMPARISON: None       CONTRAST:  None. TECHNIQUE:    Thin axial images were obtained through the abdomen and pelvis. Coronal and   sagittal reformats were generated. Oral contrast was not administered.  CT dose   reduction was achieved through use of a standardized protocol tailored for this   examination and automatic exposure control for dose modulation. The absence of intravenous contrast material reduces the sensitivity for   evaluation of the vasculature and solid organs. FINDINGS:    LOWER THORAX: No significant abnormality in the incidentally imaged lower chest.   LIVER: No mass. BILIARY TREE: Status post cholecystectomy. CBD is not dilated. SPLEEN: Spleen is mildly enlarged measuring 13.2 cm. PANCREAS: No focal abnormality. ADRENALS: Unremarkable. KIDNEYS/URETERS: Punctate nonobstructing renal stones are noted bilaterally. No   ureteral stone or hydronephrosis. STOMACH: Unremarkable. SMALL BOWEL: No dilatation or wall thickening. COLON: No dilatation or wall thickening. APPENDIX: Surgically absent. PERITONEUM: No ascites or pneumoperitoneum. RETROPERITONEUM: No lymphadenopathy or aortic aneurysm. REPRODUCTIVE ORGANS: The uterus is surgically absent. URINARY BLADDER: No mass or calculus. BONES: Degenerative changes are seen in the lumbar spine. Kyphoplasty is noted   at T12 and L1.   ABDOMINAL WALL: No mass or hernia. ADDITIONAL COMMENTS: N/A           05/19/21 0902  CT HEAD WO CONT Final result    Impression:  No acute process or change compared to the prior exam.               Narrative:  EXAM: CT HEAD WO CONT       INDICATION: AMS       COMPARISON: 11/12/2018. CONTRAST: None. TECHNIQUE: Unenhanced CT of the head was performed using 5 mm images. Brain and   bone windows were generated. Coronal and sagittal reformats. CT dose reduction   was achieved through use of a standardized protocol tailored for this   examination and automatic exposure control for dose modulation. FINDINGS:   The ventricles and sulci are normal in size, shape and configuration. . Focal   area of chronic ischemia is stable in the left basal ganglia. Small vessel   ischemic changes are again noted. There is no intracranial hemorrhage,   extra-axial collection, or mass effect.  The basilar cisterns are open. No CT   evidence of acute infarct. The bone windows demonstrate no abnormalities. The visualized portions of the   paranasal sinuses and mastoid air cells are clear. CXR Results  (Last 48 hours)               05/19/21 0915  XR CHEST PORT Final result    Impression:  1. There is cardiomegaly and mild pulmonary vascular congestion as evidenced by   increased size of vessels in the upper lung zones. No pneumonia or pulmonary   edema. Narrative:  EXAM: XR CHEST PORT       INDICATION: fall, weakness, AMS       COMPARISON: 1/1/2021       FINDINGS: A portable AP radiograph of the chest was obtained at 0906 hours. The   patient is on a cardiac monitor. There is persistent cardiomegaly. ICD remains   in place. Mild pulmonary vascular congestion is present. No pneumonia no   pulmonary edema. Medical Decision Making   I am the first provider for this patient. I reviewed the vital signs, available nursing notes, past medical history, past surgical history, family history and social history. Vital Signs-Reviewed the patient's vital signs. Patient Vitals for the past 12 hrs:   Temp Pulse Resp BP SpO2   05/19/21 1517 (!) 103 °F (39.4 °C) 81 20 (!) 157/81 94 %   05/19/21 0732 97.3 °F (36.3 °C) 82 22 (!) 174/100 96 %       EKG interpretation:   EKG per my interpretation ventricular paced rhythm, Rate 81bpm, leftward axis, QTC prolongation, no acute ischemic changes. Records Reviewed: Nursing Notes and Old Medical Records  I personally reviewed PCP office visit records from 4/12 1/21. Provider Notes (Medical Decision Making):   57-year-old female here with recurrent falls, generalized weakness, slurred speech, altered mental status. She is afebrile and vital signs stable, noted for some hypertension. EMS reports orthostatic hypotension on scene. Differential diagnosis includes infection, dehydration, electrolyte abnormality, ICH, polypharmacy.   She is on multiple medications that can exacerbate her symptoms of weakness and recurrent falls including clonazepam, cyclobenzaprine, venlafaxine, pregabalin. Additionally it appears that she is anticoagulated on Pradaxa and will obtain CT head. Will need to assess with CT abdomen pelvis, urinalysis, blood work, chest x-ray to look for signs of infection and electrolyte abnormality. Patient found to have significant urinary tract infection, I also have suspicion for polypharmacy causing her encephalopathy. Currently without fever in the ED or leukocytosis. Will treat with Rocephin. She is still encephalopathic and appears confused with delayed speech, will discuss with hospitalist for admission. ED Course:   Initial assessment performed. The patients presenting problems have been discussed, and they are in agreement with the care plan formulated and outlined with them. I have encouraged them to ask questions as they arise throughout their visit. Cardiac Monitoring: The cardiac monitor revealed the following rhythm as interpreted by me: Normal Sinus Rhythm rate 85 bpm    The cardiac monitor was ordered secondary to the patient's reported complaint of falls with altered mental status and to monitor the patient for dysrhythmia. Kareen Brody MD      Admission Note:  Patient is being admitted to the hospital by Dr. Michael Benavides, Service: Hospitalist.  The results of their tests and reasons for their admission have been discussed with them and available family. They convey agreement and understanding for the need to be admitted and for their admission diagnosis. Disposition:  Admit      Diagnosis     Clinical Impression:   1. Acute cystitis without hematuria    2. Encephalopathy    3. Polypharmacy        Attestations:  I am the first and primary provider of record for this patient's ED encounter. I personally performed the services described above in this documentation.   Kareen Brody MD    Please note that this dictation was completed with [employer], the computer voice recognition software. Quite often unanticipated grammatical, syntax, homophones, and other interpretive errors are inadvertently transcribed by the computer software. Please disregard these errors. Please excuse any errors that have escaped final proofreading. Thank you.

## 2021-05-19 NOTE — Clinical Note
Patient Class[de-identified] OBSERVATION [104]   Type of Bed: Remote Telemetry [29]   Cardiac Monitoring Required?: Yes   Reason for Observation: recurrent falls, UTI, polypharmacy   Admitting Diagnosis: UTI (urinary tract infection) [[id number]]   Admitting Physician: Latrell Epps [[id number]]   Attending Physician: Latrell Epps [[id number]]

## 2021-05-19 NOTE — REMOTE MONITORING
Spoke with primary RN Lorena regarding Sepsis 3-hour bundle.            Thank Delfino Matias, FRANCESCON, RN, [facility]  [address] # [phone]

## 2021-05-19 NOTE — H&P
.                  Hospitalist Admission Note    NAME: Rosemarie Romero   :  1950   MRN:  [id number]     Date/Time:  2021 12:49 PM    Patient PCP: Luciana Barnes MD  ______________________________________________________________________  Given the patient's current clinical presentation, I have a high level of concern for decompensation if discharged from the emergency department. Complex decision making was performed, which includes reviewing the patient's available past medical records, laboratory results, and x-ray films. My assessment of this patient's clinical condition and my plan of care is as follows. Assessment / Plan:      Acute metabolic encephalopathy  Polypharmacy  Obstructive sleep apnea  Has been taking pregabalin at home, usual dose is 300 mg daily may have taken double or triple the dose per her . Also on clonazepam.  And recently was started on tramadol. We will admit with telemetry  -We will hold off clonazepam and pregabalin  -We will perform blood gases to assure no significant CO2 narcosis/respiratory acidosis, will check ammonia level as well  -We will continue with nocturnal CPAP  -gentle IV hydration    Urinary tract infection  -Urine with pyuria and bacteriuria, burning micturition  -Will continue with ceftriaxone      BONES: Degenerative changes are seen in the lumbar spine. Kyphoplasty is noted  at T12 and L1.  ABDOMINAL WALL: No mass or hernia. ADDITIONAL COMMENTS: N/A     IMPRESSION  Bilateral nonobstructing renal stones. No ureteral stone or hydronephrosis. No  acute abnormality.     Ischemic heart disease  Chronic systolic heart failure additional fraction of 40% s/p ICD pacer  Paroxysmal atrial fibrillation  Hypertension uncontrolled  Mild troponin elevation in the setting of above  -We will continue with carvedilol 25 mg twice a day, hydralazine 50 mg 3 times a day, isosorbide 60 mg daily  -We will hold off dabigatran for now, may consider to go resume at a reduced dose of 75 mg twice a day with her creatinine clearance is less than 30   -Continue aspirin and Plavix  -will recheck troponin in 6 hours. Will consult cardiology with any symptoms, acute changes    History of diabetes complicated with neuropathy and nephropathy  Chronic kidney disease stage IV  Not on any medications  We will continue to observe for now with corrective sliding scale  Will hold of diuresis today with patient poor intake  Will consult nephrology (Sees Dr. George Mason) for evaluation     Mild splenomegaly on CT scan and mild thrombocytopenia  -US abdomen to assess liver morphology and portal circulation      Code Status: full code  Surrogate Decision Maker:  (information on record)    DVT Prophylaxis: Enoxaparin  GI Prophylaxis: not indicated    Baseline: independant      Subjective:   CHIEF COMPLAINT: lythargy    HISTORY OF PRESENT ILLNESS:     Chavez Rm is a 78 y.o.  female with past medical history significant for anxiety/depression, chronic lower back pain, diabetes with diabetic nephropathy, ischemic heart disease s/p ICD placement, paroxysmal atrial fibrillation on anticoagulation with Pradaxa, hypertension, chronic kidney disease. History was mainly elected from her  as she was very lethargic and was unable to provide any meaningful history. He described that she had been feeling lethargic since yesterday evening and had post concerns about her Lyrica dose increased recently as well as the addition of tramadol for pain control. He also had explained that she had loose bowel movements for the last 3 4 days, and had complained of burning micturition. Had felt a bit of a chill but no recorded fever. No respiratory symptoms and no other complaints. Earlier today she had slipped off her bed and fell on the floor no loss of consciousness.       In the emergency room her evaluation was significant for drowsiness/slow response, elevated blood pressure, a urinalysis with pyuria and bacteriuria, mildly elevated troponin level, creatinine of 1.89 which appears at her baseline, blood cell count of 10.5 no band forms were recorded, hemoglobin 11.5, platelets 719. We were asked to admit for work up and evaluation of the above problems.      Past Medical History:   Diagnosis Date    Anxiety 1/22/2018    Arthritis     OA    Asthma     Diabetes ([facility] [address]     Essential hypertension     GERD (gastroesophageal reflux disease)     Hypercholesterolemia 1/22/2018    Hypertension     Ill-defined condition     diverticulitis    Long-term use of high-risk medication 1/22/2018    Neuropathy     Obesity (BMI 30-39.9) 4/30/2019    Other ill-defined conditions(799.89)     IBS, spinal stenosis    Plantar fasciitis     Psychiatric disorder     depression, anxiety    Sleep apnea     uses CPAP    Type 2 diabetes mellitus with diabetic neuropathy, without long-term current use of insulin ([facility] [address] 6/5/2016        Past Surgical History:   Procedure Laterality Date    COLONOSCOPY N/A 3/14/2018    COLONOSCOPY performed by Phan Barry MD at [facility]    HX APPENDECTOMY      HX CHOLECYSTECTOMY  11/15/2018    HX HYSTERECTOMY      HX PACEMAKER      IR KYPHOPLASTY LUMBAR  12/22/2020    IR KYPHOPLASTY THORACIC  1/6/2021    OH CARDIAC SURG PROCEDURE UNLIST      stent       Social History     Tobacco Use    Smoking status: Never Smoker    Smokeless tobacco: Never Used   Substance Use Topics    Alcohol use: No        Family History   Problem Relation Age of Onset    Arthritis-osteo Mother     Hypertension Mother     High Cholesterol Mother    [facility] Crohn's Disease Mother     Heart Disease Mother     Alcohol abuse Father     High Cholesterol Sister     Hypertension Sister     Thyroid Disease Sister     COPD Sister     High Cholesterol Brother     Hypertension Brother     COPD Brother     COPD Child     Inflammatory Bowel Dz Child      Allergies Allergen Reactions    Sulfa (Sulfonamide Antibiotics) Swelling    Amoxicillin Swelling        Prior to Admission medications    Medication Sig Start Date End Date Taking? Authorizing Provider   pantoprazole (PROTONIX) 40 mg tablet Take 1 Tab by mouth daily. 4/21/21   Kishore Walls MD   torsemide (DEMADEX) 20 mg tablet Take 1 Tab by mouth daily. 4/13/21   Kishore Walls MD   ferrous sulfate 325 mg (65 mg iron) tablet Take 1 Tab by mouth every other day. 4/13/21   Kishore Walls MD   hydrALAZINE (APRESOLINE) 50 mg tablet Take 1.5 Tabs by mouth two (2) times a day. Patient taking differently: Take 75 mg by mouth three (3) times daily. 4/13/21   Kishore Walls MD   mupirocin (BACTROBAN) 2 % ointment Apply  to affected area daily. 3/31/21   Kishore Walls MD   clonazePAM (KlonoPIN) 0.5 mg tablet TAKE 1 TABLET BY MOUTH NIGHTLY . DO NOT EXCEED  0.5  MG  PER  DAY 3/29/21   Chidi Walls MD   cyclobenzaprine (FLEXERIL) 10 mg tablet TAKE 1 TABLET BY MOUTH THREE TIMES DAILY AS NEEDED FOR MUSCLE SPASM 1/28/21   Kishore Walls MD   eucalyptus-peppermint oil (Ponaris) soln 1-2 Drops by Nasal route nightly as needed. Provider, Historical   pregabalin (Lyrica) 150 mg capsule Take 150 mg by mouth two (2) times a day. Provider, Historical   triamcinolone acetonide (KENALOG) 0.1 % topical cream APPLY A THIN FILM OF CREAM EXTERNALLY TO AFFECTED AREA TWICE DAILY 10/8/20   Kishore Walls MD   isosorbide mononitrate ER (IMDUR) 60 mg CR tablet Take 1 Tab by mouth daily. 10/7/20   Dillon Harry MD   magnesium oxide (MAG-OX) 400 mg tablet Take 1 tablet by mouth twice daily  Patient taking differently: daily. 9/29/20   Dillon Harry MD   potassium chloride SR (KLOR-CON 10) 10 mEq tablet Take 1 Tab by mouth three (3) times daily. Patient taking differently: Take 10 mEq by mouth four (4) times daily.  5/11/20   Dillon Harry MD   SYMBICORT 160-4.5 mcg/actuation HFAA INHALE 2 PUFFS BY MOUTH TWICE DAILY 10/1/19   Yohana Walls MD   albuterol (PROVENTIL HFA, VENTOLIN HFA, PROAIR HFA) 90 mcg/actuation inhaler Take 1 Puff by inhalation every four (4) hours as needed for Wheezing. Patient taking differently: Take 1 Puff by inhalation two (2) times daily as needed for Wheezing. 8/17/19   Alexa Hagan MD   dabigatran etexilate (PRADAXA) 150 mg capsule Take 1 Cap by mouth two (2) times a day. IF NO BLEEDING AT CATH SITE. 2/24/19   Kelsy Wakefield MD   sodium chloride (AYR SALINE) 0.65 % nasal squeeze bottle 2 Sprays by Both Nostrils route every eight (8) hours as needed. Provider, Historical   conjugated estrogens (PREMARIN) 0.625 mg/gram vaginal cream Insert 0.5 g into vagina two (2) times a week. Tuesdays and Thursdays     Provider, Historical   venlafaxine-SR (EFFEXOR XR) 150 mg capsule Take 150 mg by mouth two (2) times daily (with meals). Provider, Historical   cholecalciferol (Vitamin D3) 25 mcg (1,000 unit) cap Take 1,000 Units by mouth daily. Other, MD Ernie   aspirin 81 mg chewable tablet Take 81 mg by mouth daily. Provider, Historical   atorvastatin (LIPITOR) 40 mg tablet Take 1 Tab by mouth nightly. 4/9/14   Kelsy Wakefield MD       REVIEW OF SYSTEMS:     I am not able to complete the review of systems because:    The patient is intubated and sedated   x The patient has altered mental status due to his acute medical problems    The patient has baseline aphasia from prior stroke(s)    The patient has baseline dementia and is not reliable historian    The patient is in acute medical distress and unable to provide information           Total of 12 systems reviewed as follows:       POSITIVE= underlined text  Negative = text not underlined  General:  fever, chills, sweats, generalized weakness, weight loss/gain,      loss of appetite   Eyes:    blurred vision, eye pain, loss of vision, double vision  ENT:    rhinorrhea, pharyngitis Respiratory:   cough, sputum production, SOB, STEEN, wheezing, pleuritic pain   Cardiology:   chest pain, palpitations, orthopnea, PND, edema, syncope   Gastrointestinal:  abdominal pain , N/V, diarrhea, dysphagia, constipation, bleeding   Genitourinary:  frequency, urgency, dysuria, hematuria, incontinence   Muskuloskeletal :  arthralgia, myalgia, back pain  Hematology:  easy bruising, nose or gum bleeding, lymphadenopathy   Dermatological: rash, ulceration, pruritis, color change / jaundice  Endocrine:   hot flashes or polydipsia   Neurological:  headache, dizziness, confusion, focal weakness, paresthesia,     Speech difficulties, memory loss, gait difficulty  Psychological: Feelings of anxiety, depression, agitation    Objective:   VITALS:    Visit Vitals  BP (!) 174/100 (BP 1 Location: Right upper arm, BP Patient Position: Supine)   Pulse 82   Temp 97.3 °F (36.3 °C)   Resp 22   Ht 5' 9\" (1.753 m)   Wt 122.5 kg (270 lb)   SpO2 96%   BMI 39.87 kg/m²       PHYSICAL EXAM:    General:    Alert, cooperative, no distress, appears stated age. HEENT: Atraumatic, anicteric sclerae, pink conjunctivae     No oral ulcers, mucosa moist, throat clear, dentition fair  Neck:  Supple, symmetrical,  thyroid: non tender  Lungs:   Clear to auscultation bilaterally. No Wheezing or Rhonchi. No rales. Chest wall:  No tenderness  No Accessory muscle use. Heart:   Regular  rhythm,  No  murmur   No edema  Abdomen:   Soft, non-tender. Not distended. Bowel sounds normal  Extremities: No cyanosis. No clubbing,      Skin turgor normal, Capillary refill normal, Radial dial pulse 2+  Skin:     Not pale. Not Jaundiced  No rashes   Psych:  Fair insight. Not depressed. Not anxious or agitated. Neurologic: EOMs intact. No facial asymmetry. Slow to respond. Obvious asterixis. Symmetrical strength, Sensation grossly intact.  Alert and oriented X 4.     _______________________________________________________________________  Care Plan discussed with:    Comments   Patient x    Family  x  at the bedside   RN     Care Manager                    Consultant:  frantz ED attending   _______________________________________________________________________  Expected  Disposition:   Home with Family x   HH/PT/OT/RN    SNF/LTC    LISA    ________________________________________________________________________  TOTAL TIME:  46 Minutes    Critical Care Provided     Minutes non procedure based      Comments    x Reviewed previous records   >50% of visit spent in counseling and coordination of care x Discussion with patient and/or family and questions answered       ________________________________________________________________________  Signed: Jose Luis Pierre MD    Procedures: see electronic medical records for all procedures/Xrays and details which were not copied into this note but were reviewed prior to creation of Plan.     LAB DATA REVIEWED:    Recent Results (from the past 24 hour(s))   EKG, 12 LEAD, INITIAL    Collection Time: 05/19/21 10:00 AM   Result Value Ref Range    Ventricular Rate 81 BPM    Atrial Rate 61 BPM    QRS Duration 152 ms    Q-T Interval 440 ms    QTC Calculation (Bezet) 511 ms    Calculated R Axis -90 degrees    Calculated T Axis 81 degrees    Diagnosis       Ventricular-paced rhythm  When compared with ECG of 01-JAN-2021 10:49,  No significant change was found     CBC WITH AUTOMATED DIFF    Collection Time: 05/19/21 10:05 AM   Result Value Ref Range    WBC 10.5 3.6 - 11.0 K/uL    RBC 4.02 3.80 - 5.20 M/uL    HGB 11.5 11.5 - 16.0 g/dL    HCT 36.7 35.0 - 47.0 %    MCV 91.3 80.0 - 99.0 FL    MCH 28.6 26.0 - 34.0 PG    MCHC 31.3 30.0 - 36.5 g/dL    RDW 16.5 (H) 11.5 - 14.5 %    PLATELET 191 (L) 253 - 400 K/uL    MPV 12.1 8.9 - 12.9 FL    NRBC 0.0 0  WBC    ABSOLUTE NRBC 0.00 0.00 - 0.01 K/uL    NEUTROPHILS 88 (H) 32 - 75 %    LYMPHOCYTES 3 (L) 12 - 49 %    MONOCYTES 8 5 - 13 %    EOSINOPHILS 0 0 - 7 %    BASOPHILS 0 0 - 1 %    IMMATURE GRANULOCYTES 1 (H) 0.0 - 0.5 %    ABS. NEUTROPHILS 9.3 (H) 1.8 - 8.0 K/UL    ABS. LYMPHOCYTES 0.3 (L) 0.8 - 3.5 K/UL    ABS. MONOCYTES 0.8 0.0 - 1.0 K/UL    ABS. EOSINOPHILS 0.0 0.0 - 0.4 K/UL    ABS. BASOPHILS 0.0 0.0 - 0.1 K/UL    ABS. IMM. GRANS. 0.1 (H) 0.00 - 0.04 K/UL    DF AUTOMATED     METABOLIC PANEL, COMPREHENSIVE    Collection Time: 05/19/21 10:05 AM   Result Value Ref Range    Sodium 142 136 - 145 mmol/L    Potassium 3.3 (L) 3.5 - 5.1 mmol/L    Chloride 108 97 - 108 mmol/L    CO2 27 21 - 32 mmol/L    Anion gap 7 5 - 15 mmol/L    Glucose 227 (H) 65 - 100 mg/dL    BUN 33 (H) 6 - 20 MG/DL    Creatinine 1.89 (H) 0.55 - 1.02 MG/DL    BUN/Creatinine ratio 17 12 - 20      GFR est AA 32 (L) >60 ml/min/1.73m2    GFR est non-AA 26 (L) >60 ml/min/1.73m2    Calcium 9.4 8.5 - 10.1 MG/DL    Bilirubin, total 1.2 (H) 0.2 - 1.0 MG/DL    ALT (SGPT) 27 12 - 78 U/L    AST (SGOT) 30 15 - 37 U/L    Alk.  phosphatase 185 (H) 45 - 117 U/L    Protein, total 7.0 6.4 - 8.2 g/dL    Albumin 3.1 (L) 3.5 - 5.0 g/dL    Globulin 3.9 2.0 - 4.0 g/dL    A-G Ratio 0.8 (L) 1.1 - 2.2     ETHYL ALCOHOL    Collection Time: 05/19/21 10:05 AM   Result Value Ref Range    ALCOHOL(ETHYL),SERUM <10 <10 MG/DL   POC TROPONIN-I    Collection Time: 05/19/21 10:22 AM   Result Value Ref Range    Troponin-I (POC) 0.10 (H) 0.00 - 0.08 ng/mL   GLUCOSE, POC    Collection Time: 05/19/21 10:25 AM   Result Value Ref Range    Glucose (POC) 217 (H) 65 - 117 mg/dL    Performed by Divya Lange RN ([facility])    URINALYSIS W/ REFLEX CULTURE    Collection Time: 05/19/21 10:52 AM    Specimen: Urine   Result Value Ref Range    Color YELLOW/STRAW      Appearance TURBID (A) CLEAR      Specific gravity 1.016 1.003 - 1.030      pH (UA) 6.0 5.0 - 8.0      Protein 300 (A) NEG mg/dL    Glucose Negative NEG mg/dL    Ketone Negative NEG mg/dL    Bilirubin Negative NEG      Blood LARGE (A) NEG      Urobilinogen 0.2 0.2 - 1.0 EU/dL    Nitrites Negative NEG      Leukocyte Esterase LARGE (A) NEG      WBC >100 (H) 0 - 4 /hpf    RBC >100 (H) 0 - 5 /hpf    Epithelial cells FEW FEW /lpf    Bacteria 4+ (A) NEG /hpf    UA:UC IF INDICATED URINE CULTURE ORDERED (A) CNI      Hyaline cast 2-5 0 - 5 /lpf   POC TROPONIN-I    Collection Time: 05/19/21 11:03 AM   Result Value Ref Range    Troponin-I (POC) 0.09 (H) 0.00 - 0.08 ng/mL

## 2021-05-20 LAB
ALBUMIN SERPL-MCNC: 2.6 G/DL (ref 3.5–5)
ALBUMIN/GLOB SERPL: 0.7 {RATIO} (ref 1.1–2.2)
ALP SERPL-CCNC: 139 U/L (ref 45–117)
ALT SERPL-CCNC: 21 U/L (ref 12–78)
ANION GAP SERPL CALC-SCNC: 5 MMOL/L (ref 5–15)
APTT PPP: 32 SEC (ref 22.1–31)
AST SERPL-CCNC: 33 U/L (ref 15–37)
BASOPHILS # BLD: 0 K/UL (ref 0–0.1)
BASOPHILS NFR BLD: 0 % (ref 0–1)
BILIRUB SERPL-MCNC: 0.8 MG/DL (ref 0.2–1)
BUN SERPL-MCNC: 37 MG/DL (ref 6–20)
BUN/CREAT SERPL: 18 (ref 12–20)
CALCIUM SERPL-MCNC: 8.6 MG/DL (ref 8.5–10.1)
CHLORIDE SERPL-SCNC: 108 MMOL/L (ref 97–108)
CO2 SERPL-SCNC: 29 MMOL/L (ref 21–32)
CREAT SERPL-MCNC: 2.03 MG/DL (ref 0.55–1.02)
DIFFERENTIAL METHOD BLD: ABNORMAL
EOSINOPHIL # BLD: 0.1 K/UL (ref 0–0.4)
EOSINOPHIL NFR BLD: 1 % (ref 0–7)
ERYTHROCYTE [DISTWIDTH] IN BLOOD BY AUTOMATED COUNT: 16.6 % (ref 11.5–14.5)
GLOBULIN SER CALC-MCNC: 3.5 G/DL (ref 2–4)
GLUCOSE BLD STRIP.AUTO-MCNC: 143 MG/DL (ref 65–117)
GLUCOSE BLD STRIP.AUTO-MCNC: 160 MG/DL (ref 65–117)
GLUCOSE BLD STRIP.AUTO-MCNC: 165 MG/DL (ref 65–117)
GLUCOSE SERPL-MCNC: 135 MG/DL (ref 65–100)
HCT VFR BLD AUTO: 30.8 % (ref 35–47)
HGB BLD-MCNC: 9.6 G/DL (ref 11.5–16)
IMM GRANULOCYTES # BLD AUTO: 0 K/UL (ref 0–0.04)
IMM GRANULOCYTES NFR BLD AUTO: 0 % (ref 0–0.5)
LYMPHOCYTES # BLD: 0.1 K/UL (ref 0.8–3.5)
LYMPHOCYTES NFR BLD: 1 % (ref 12–49)
MAGNESIUM SERPL-MCNC: 1.8 MG/DL (ref 1.6–2.4)
MCH RBC QN AUTO: 28.6 PG (ref 26–34)
MCHC RBC AUTO-ENTMCNC: 31.2 G/DL (ref 30–36.5)
MCV RBC AUTO: 91.7 FL (ref 80–99)
MONOCYTES # BLD: 0.3 K/UL (ref 0–1)
MONOCYTES NFR BLD: 5 % (ref 5–13)
NEUTS BAND NFR BLD MANUAL: 2 %
NEUTS SEG # BLD: 5.4 K/UL (ref 1.8–8)
NEUTS SEG NFR BLD: 91 % (ref 32–75)
NRBC # BLD: 0 K/UL (ref 0–0.01)
NRBC BLD-RTO: 0 PER 100 WBC
PHOSPHATE SERPL-MCNC: 2.8 MG/DL (ref 2.6–4.7)
PLATELET # BLD AUTO: 105 K/UL (ref 150–400)
PMV BLD AUTO: 12.5 FL (ref 8.9–12.9)
POTASSIUM SERPL-SCNC: 3.2 MMOL/L (ref 3.5–5.1)
PROT SERPL-MCNC: 6.1 G/DL (ref 6.4–8.2)
RBC # BLD AUTO: 3.36 M/UL (ref 3.8–5.2)
RBC MORPH BLD: ABNORMAL
SERVICE CMNT-IMP: ABNORMAL
SODIUM SERPL-SCNC: 142 MMOL/L (ref 136–145)
THERAPEUTIC RANGE,PTTT: ABNORMAL SECS (ref 58–77)
WBC # BLD AUTO: 5.9 K/UL (ref 3.6–11)

## 2021-05-20 PROCEDURE — 74011250637 HC RX REV CODE- 250/637: Performed by: STUDENT IN AN ORGANIZED HEALTH CARE EDUCATION/TRAINING PROGRAM

## 2021-05-20 PROCEDURE — 83735 ASSAY OF MAGNESIUM: CPT

## 2021-05-20 PROCEDURE — 74011000258 HC RX REV CODE- 258: Performed by: NURSE PRACTITIONER

## 2021-05-20 PROCEDURE — 74011250637 HC RX REV CODE- 250/637: Performed by: NURSE PRACTITIONER

## 2021-05-20 PROCEDURE — 85730 THROMBOPLASTIN TIME PARTIAL: CPT

## 2021-05-20 PROCEDURE — 74011636637 HC RX REV CODE- 636/637: Performed by: STUDENT IN AN ORGANIZED HEALTH CARE EDUCATION/TRAINING PROGRAM

## 2021-05-20 PROCEDURE — 84100 ASSAY OF PHOSPHORUS: CPT

## 2021-05-20 PROCEDURE — 80053 COMPREHEN METABOLIC PANEL: CPT

## 2021-05-20 PROCEDURE — 85025 COMPLETE CBC W/AUTO DIFF WBC: CPT

## 2021-05-20 PROCEDURE — 74011250636 HC RX REV CODE- 250/636: Performed by: NURSE PRACTITIONER

## 2021-05-20 PROCEDURE — 65660000000 HC RM CCU STEPDOWN

## 2021-05-20 PROCEDURE — 82962 GLUCOSE BLOOD TEST: CPT

## 2021-05-20 PROCEDURE — 87040 BLOOD CULTURE FOR BACTERIA: CPT

## 2021-05-20 PROCEDURE — 36415 COLL VENOUS BLD VENIPUNCTURE: CPT

## 2021-05-20 PROCEDURE — 99233 SBSQ HOSP IP/OBS HIGH 50: CPT | Performed by: CLINICAL NURSE SPECIALIST

## 2021-05-20 RX ORDER — CARVEDILOL 25 MG/1
25 TABLET ORAL 2 TIMES DAILY WITH MEALS
COMMUNITY
End: 2021-12-01

## 2021-05-20 RX ORDER — HEPARIN SODIUM 10000 [USP'U]/100ML
8.1-25 INJECTION, SOLUTION INTRAVENOUS
Status: DISCONTINUED | OUTPATIENT
Start: 2021-05-20 | End: 2021-05-24 | Stop reason: HOSPADM

## 2021-05-20 RX ORDER — HEPARIN SODIUM 5000 [USP'U]/ML
2000 INJECTION, SOLUTION INTRAVENOUS; SUBCUTANEOUS AS NEEDED
Status: DISCONTINUED | OUTPATIENT
Start: 2021-05-20 | End: 2021-05-24 | Stop reason: HOSPADM

## 2021-05-20 RX ORDER — HEPARIN SODIUM 5000 [USP'U]/ML
4000 INJECTION, SOLUTION INTRAVENOUS; SUBCUTANEOUS AS NEEDED
Status: DISCONTINUED | OUTPATIENT
Start: 2021-05-20 | End: 2021-05-24 | Stop reason: HOSPADM

## 2021-05-20 RX ORDER — HYDRALAZINE HYDROCHLORIDE 25 MG/1
75 TABLET, FILM COATED ORAL 3 TIMES DAILY
COMMUNITY

## 2021-05-20 RX ORDER — POTASSIUM CHLORIDE 750 MG/1
40 TABLET, FILM COATED, EXTENDED RELEASE ORAL
Status: COMPLETED | OUTPATIENT
Start: 2021-05-20 | End: 2021-05-20

## 2021-05-20 RX ADMIN — Medication 10 ML: at 05:28

## 2021-05-20 RX ADMIN — Medication 10 ML: at 21:19

## 2021-05-20 RX ADMIN — INSULIN LISPRO 2 UNITS: 100 INJECTION, SOLUTION INTRAVENOUS; SUBCUTANEOUS at 08:39

## 2021-05-20 RX ADMIN — HYDRALAZINE HYDROCHLORIDE 50 MG: 50 TABLET, FILM COATED ORAL at 21:17

## 2021-05-20 RX ADMIN — CARVEDILOL 25 MG: 12.5 TABLET, FILM COATED ORAL at 08:39

## 2021-05-20 RX ADMIN — INSULIN LISPRO 2 UNITS: 100 INJECTION, SOLUTION INTRAVENOUS; SUBCUTANEOUS at 12:23

## 2021-05-20 RX ADMIN — HYDRALAZINE HYDROCHLORIDE 50 MG: 50 TABLET, FILM COATED ORAL at 18:24

## 2021-05-20 RX ADMIN — HYDRALAZINE HYDROCHLORIDE 50 MG: 50 TABLET, FILM COATED ORAL at 08:39

## 2021-05-20 RX ADMIN — ATORVASTATIN CALCIUM 40 MG: 40 TABLET, FILM COATED ORAL at 21:17

## 2021-05-20 RX ADMIN — ACETAMINOPHEN 650 MG: 325 TABLET ORAL at 05:25

## 2021-05-20 RX ADMIN — ISOSORBIDE MONONITRATE 60 MG: 30 TABLET, EXTENDED RELEASE ORAL at 08:39

## 2021-05-20 RX ADMIN — POTASSIUM CHLORIDE 40 MEQ: 750 TABLET, EXTENDED RELEASE ORAL at 07:37

## 2021-05-20 RX ADMIN — ASPIRIN 81 MG: 81 TABLET, CHEWABLE ORAL at 08:39

## 2021-05-20 RX ADMIN — Medication 10 ML: at 14:00

## 2021-05-20 RX ADMIN — CARVEDILOL 25 MG: 12.5 TABLET, FILM COATED ORAL at 18:24

## 2021-05-20 RX ADMIN — CEFTRIAXONE 1 G: 1 INJECTION, POWDER, FOR SOLUTION INTRAMUSCULAR; INTRAVENOUS at 18:24

## 2021-05-20 RX ADMIN — HEPARIN SODIUM 8.1 UNITS/KG/HR: 10000 INJECTION, SOLUTION INTRAVENOUS at 20:48

## 2021-05-20 RX ADMIN — PANTOPRAZOLE SODIUM 40 MG: 40 TABLET, DELAYED RELEASE ORAL at 05:26

## 2021-05-20 NOTE — CONSULTS
[facility]  Consult Note        NAME: Mame Case       :  1950       MRN:  [id number]     Date/Time: 2021    Risk of deterioration: medium       Assessment:    Plan:  GNR Bacteremia  UTI  AYLIN/CKD 3-due to above/other comorbidities  Hypokalemia  HTN  CMO/EF 35%, pulm htn/tr  DM  OBesity  (B) non obstructing stones Creatinine up to 2 with GNR bacteremia. On Rocephin  Febrile/BP labile-may have had ischemic atn from this--bp 174/100-to 90/50  Replete K  CT:  Bilateral nonobstructing renal stones. No ureteral stone or hydronephrosis. No  acute abnormality. IVF adjusted-on LR now. Platelets/ H&H worse     Asked to see for aylin in the setting of uti/gnr bacteremia. Dr. Laura Gunter last note reviewed:  chronic kidney disease stage 3  CKD stage 3b, A2; h/o fluctuation in creatinine ( suspect underlying HTN nephrosclerosis/obesity/DM r/t CKD and superimposed cardiorenal syndrome). Creatinine ~1.3-1.4    Subjective:     Chief Complaint:  I\"m feeling a little better, still have a fever    Review of Systems: (+)hx of uti, (+) dysuria PTA  (-) n/v/cp/sob    Objective:     VITALS:   Last 24hrs VS reviewed since prior progress note.  Most recent are:  Visit Vitals  BP (!) 146/66 (BP 1 Location: Right upper arm, BP Patient Position: At rest)   Pulse 80   Temp 99.1 °F (37.3 °C)   Resp 18   Ht 5' 9\" (1.753 m)   Wt 122.5 kg (270 lb)   SpO2 95%   BMI 39.87 kg/m²     SpO2 Readings from Last 6 Encounters:   21 95%   21 96%   21 97%   21 96%   21 96%   21 96%            Intake/Output Summary (Last 24 hours) at 2021 1123  Last data filed at 2021 0840  Gross per 24 hour   Intake 683.33 ml   Output --   Net 683.33 ml        Telemetry Reviewed     PHYSICAL EXAM:    General   well developed, obese,  appears stated age, in no acute distress  EENT  Normocephalic, Atraumatic, EOMI  Respiratory   Clear To Auscultation bilaterally   Cardiology  Regular Rate and Rythmn    Abdominal  Soft, non-tender, non-distended  Extremities  No clubbing, cyanosis, tr edema             Lab Data Reviewed: (see below)    Medications Reviewed: (see below)    PMH/SH reviewed - no change compared to H&P    ___________________________________________________    Attending Physician: Lydia Mccollum MD     ____________________________________________________  MEDICATIONS:  Current Facility-Administered Medications   Medication Dose Route Frequency    cefTRIAXone (ROCEPHIN) 1 g in 0.9% sodium chloride (MBP/ADV) 50 mL MBP  1 g IntraVENous Q24H    sodium chloride (NS) flush 5-40 mL  5-40 mL IntraVENous Q8H    sodium chloride (NS) flush 5-40 mL  5-40 mL IntraVENous PRN    acetaminophen (TYLENOL) tablet 650 mg  650 mg Oral Q6H PRN    Or    acetaminophen (TYLENOL) suppository 650 mg  650 mg Rectal Q6H PRN    polyethylene glycol (MIRALAX) packet 17 g  17 g Oral DAILY PRN    promethazine (PHENERGAN) tablet 12.5 mg  12.5 mg Oral Q6H PRN    Or    ondansetron (ZOFRAN) injection 4 mg  4 mg IntraVENous Q6H PRN    aspirin chewable tablet 81 mg  81 mg Oral DAILY    atorvastatin (LIPITOR) tablet 40 mg  40 mg Oral QHS    carvediloL (COREG) tablet 25 mg  25 mg Oral BID WITH MEALS    isosorbide mononitrate ER (IMDUR) tablet 60 mg  60 mg Oral DAILY    hydrALAZINE (APRESOLINE) tablet 50 mg  50 mg Oral TID    pantoprazole (PROTONIX) tablet 40 mg  40 mg Oral ACB    albuterol-ipratropium (DUO-NEB) 2.5 MG-0.5 MG/3 ML  3 mL Nebulization Q6H PRN    enoxaparin (LOVENOX) injection 40 mg  40 mg SubCUTAneous Q24H    lactated Ringers infusion  50 mL/hr IntraVENous CONTINUOUS    glucose chewable tablet 16 g  4 Tablet Oral PRN    dextrose (D50W) injection syrg 12.5-25 g  25-50 mL IntraVENous PRN    glucagon (GLUCAGEN) injection 1 mg  1 mg IntraMUSCular PRN    insulin lispro (HUMALOG) injection   SubCUTAneous AC&HS        LABS:  Recent Labs     05/20/21  0335 05/19/21  1005   WBC 5.9 10.5   HGB 9.6* 11.5   HCT 30.8* 36.7   * 139*     Recent Labs     05/20/21  0335 05/19/21  1005    142   K 3.2* 3.3*    108   CO2 29 27   BUN 37* 33*   CREA 2.03* 1.89*   * 227*   CA 8.6 9.4   MG 1.8  --    PHOS 2.8  --      Recent Labs     05/20/21  0335 05/19/21  1005   ALT 21 27   * 185*   TBILI 0.8 1.2*   TP 6.1* 7.0   ALB 2.6* 3.1*   GLOB 3.5 3.9     No results for input(s): INR, PTP, APTT, INREXT in the last 72 hours. No results for input(s): FE, TIBC, PSAT, FERR in the last 72 hours. No results for input(s): PH, PCO2, PO2 in the last 72 hours.   Recent Labs     05/19/21  1705   TROIQ 0.20*     Lab Results   Component Value Date/Time    Glucose (POC) 160 (H) 05/20/2021 11:16 AM    Glucose (POC) 143 (H) 05/20/2021 07:09 AM    Glucose (POC) 160 (H) 05/19/2021 09:27 PM    Glucose (POC) 180 (H) 05/19/2021 05:20 PM    Glucose (POC) 217 (H) 05/19/2021 10:25 AM

## 2021-05-20 NOTE — PROGRESS NOTES
Discussed anticoagulation with attending MD.  Will begin heparin gtt. H/H down, likely dilutional component. - Will need to monitor blood count.

## 2021-05-20 NOTE — PROGRESS NOTES
Hospitalist Progress Note    NAME: Kendell Velez   :  1950   MRN:  [id number]     I reviewed pertinent labs/imaging and discussed plan of care with Attending Physician who is in agreement. Assessment / Plan:  Acute metabolic encephalopathy   Polypharmacy  UTI  CT head 21:  No acute process or change compared to the prior exam.  CT abdomen/pelvis 21:  Bilateral nonobstructing renal stones. No ureteral stone or hydronephrosis. No acute abnormality. UA 21:  Turbid, large blood, large LE, negative nitrite, 4+ bact. Urine culture 21:  Pending.  - Encephalopathy seems to be improving.   - Patient has been taking pregabalin at home PTA but according to her  has been overtaking this medication (double or triple dose). She is also on clonazepam and recently received a prescription for tramadol and lorazepam.  - [facility] [place]:     - Pregabalin 300 mg #180 for 90 day supply, last filled 21.     - Clonazepam 0.5 mg #30 for 30 day supply, last filled 21.     - Tramadol 50 mg #28 for 7 day supply, filled 21.     - Lorazepam 1 mg #2 for 1 day supply, filled 21.  - Continue ceftriaxone 1 gm IV daily. CAD  HTN  Dyslipidemia   Chronic systolic heart failure  PAF  Mild troponin elevation   - Cardiology consulted, awaiting input.   - Continue asa 81 mg po daily. - Continue carvedilol 25 mg po bid. - Continue hydralazine 50 mg po tid. - Continue isosorbide mononitrate ER 60 mg po daily. - Continue atorvastatin 40 mg po daily.  - Hold dabigatran for now 2/2 renal function.    - Hold diuretic for now 2/2 renal function. CKD stage III-IV  Diabetic nephropathy   - Nephrology consulted, awaiting input. - Avoid nephrotoxins.  - Adjust meds based on CrCl. - Monitor. Hypokalemia   - Supplement. - Monitor. Anemia of chronic disease   Thrombocytopenia  - No obvious sign of active hemorrhage.    - No tx indicated at this time. - Monitor. DM II  Hyperglycemia   Diabetic neuropathy  - Continue FSBS with SSI correction scale. - Continue to hold pregabalin for now. SHAYLA  - Continue CPAP at hs. GERD  - Continue pantoprazole 40 mg po daily. Mild splenomegaly on CT  - OP follow up.        30.0 - 39.9 Obese / Body mass index is 39.87 kg/m². Estimated discharge date:  TBD  Barriers:  None    Code status: Full  Prophylaxis: Lovenox  Recommended Disposition: Home w/Family     Subjective:     Chief Complaint / Reason for Physician Visit  Complains of LE neuropathy pain. Plan of care and pertinent events reviewed with bedside nurse. Review of Systems:  Symptom Y/N Comments  Symptom Y/N Comments   Fever/Chills Y   Chest Pain N    Poor Appetite N   Edema Y    Cough N   Abdominal Pain Y    Sputum N   Joint Pain N    SOB/STEEN N   Pruritis/Rash N    Nausea/vomit N   Tolerating PT/OT     Diarrhea N   Tolerating Diet Y    Constipation N   Other Y Neuropathy     Could NOT obtain due to:      Objective:     VITALS:   Last 24hrs VS reviewed since prior progress note. Most recent are:  Patient Vitals for the past 24 hrs:   Temp Pulse Resp BP SpO2   05/20/21 0751 -- 86 -- -- --   05/20/21 0520 (!) 101.3 °F (38.5 °C) 86 19 (!) 141/67 92 %   05/20/21 0041 98.1 °F (36.7 °C) 76 19 (!) 123/45 100 %   05/19/21 2152 -- -- -- (!) 108/57 --   05/19/21 1956 98.4 °F (36.9 °C) 80 19 (!) 90/50 92 %   05/19/21 1637 (!) 100.8 °F (38.2 °C) 80 19 (!) 123/55 (!) 89 %   05/19/21 1517 (!) 103 °F (39.4 °C) 81 20 (!) 157/81 94 %       Intake/Output Summary (Last 24 hours) at 5/20/2021 0820  Last data filed at 5/20/2021 0315  Gross per 24 hour   Intake 443.33 ml   Output --   Net 443.33 ml        I had a face to face encounter and independently examined this patient on 5/20/2021, as outlined below:  PHYSICAL EXAM:  General:  A/A. Able to state why she is in the hospital but unable to state the day of week or date (thinks it is Wednesday 12/19/21). NAD.       HEENT: Normocephalic. Sclera anicteric. EOMI. Mucous membranes moist.    Chest:  Resps even/unlabored with symmetrical CWE. Air entry diminished at bases. Lungs CTA. No use of accessory muscles. CV:  RRR. Normal S1/S2. Heart tones difficult to assess 2/2 body habitus and positioning. No JVD. Trace pretibial edema tello. GI:  Abdomen obese/NT. No organomegaly. No hernia. ABT X 4.    :  Voiding. Neurologic:  Nonfocal.  CN II-XII grossly intact. Face symmetrical.  Speech normal.     Psych:  Cooperative. No anxiety or agitation. No suicidal/homicidal ideation. Skin:  No jaundice. Chronic edema skin color changes noted on tello LEs. Reviewed most current lab test results and cultures  YES  Reviewed most current radiology test results   YES  Review and summation of old records today    NO  Reviewed patient's current orders and MAR    YES  PMH/SH reviewed - no change compared to H&P  ________________________________________________________________________  Care Plan discussed with:    Comments   [address] [place] [address]     Consultant                        Multidiciplinary team rounds were held today with , nursing, pharmacist and clinical coordinator. Patient's plan of care was discussed; medications were reviewed and discharge planning was addressed. ________________________________________________________________________  [place], NP     Procedures: see electronic medical records for all procedures/Xrays and details which were not copied into this note but were reviewed prior to creation of Plan. LABS:  I reviewed today's most current labs and imaging studies.   Pertinent labs include:  Recent Labs     05/20/21  0335 05/19/21  1005   WBC 5.9 10.5   HGB 9.6* 11.5   HCT 30.8* 36.7   * 139*     Recent Labs     05/20/21  0335 05/19/21  1005    142   K 3.2* 3.3*    108   CO2 29 27   * 227*   BUN 37* 33*   CREA 2.03* 1.89* CA 8.6 9.4   MG 1.8  --    PHOS 2.8  --    ALB 2.6* 3.1*   TBILI 0.8 1.2*   ALT 21 27       Signed: Mikey Morales NP

## 2021-05-20 NOTE — PROGRESS NOTES
End of Shift Note    Bedside shift change report given to Shameka Stearns RN(oncoming nurse) by Kumar Velasquez LPN\ Librado Benson RN (offgoing nurse). Report included the following information SBAR, Kardex, Intake/Output, MAR and Recent Results    Shift worked:   [id number]   Shift summary and any significant changes:     Patient given Albumin for hypotensive episode. NP notified see Note. Lethargy subsided and Patient is more alert. Vital Signs stable. Concerns for physician to address:       Zone phone for oncoming shift:   [phone]       Activity:  Activity Level: Up with Assistance  Number times ambulated in hallways past shift: 0  Number of times OOB to chair past shift: 0    Cardiac:   Cardiac Monitoring: Yes           Access:   Current line(s): PIV     Genitourinary:   Urinary status: external catheter    Respiratory:   O2 Device: None (Room air)  Chronic home O2 use?: NO  Incentive spirometer at bedside: NO     GI:  Last Bowel Movement Date: 05/19/21  Current diet:  DIET DIABETIC CONSISTENT CARB Regular  Passing flatus: YES  Tolerating current diet: YES       Pain Management:   Patient states pain is manageable on current regimen: YES    Skin:  Preet Score: 18  Interventions: increase time out of bed and nutritional support     Patient Safety:  Fall Score:  Total Score: 5  Interventions: bed/chair alarm and gripper socks  High Fall Risk: Yes    Length of Stay:  Expected LOS: - - -  Actual LOS: Cheli 1466 LPN

## 2021-05-20 NOTE — PROGRESS NOTES
Transition of Care Plan:    RUR: 34%  Disposition: Home w/ 2 Carina Haque referral sent via [facility] after obtaining [facility]  -4:25 pm [facility] Accepted. Information added to AVS.   Follow up appointments: PCP and specialists as indicated  DME needed: None identified  Transportation at Discharge:  - private vehicle  Keys or means to access home:     Per spouse   IM Medicare letter: Will need 2nd IM letter at discharge  Caregiver Contact:  - Hari Isbell - [phone]  Discharge Caregiver contacted prior to discharge? CM spoke to patient and her spouse - Hari Isbell - to confirm [facility] for [facility] at discharge - particular need for medication management and verification of home use matching discharge summary (AVS). Reason for Admission:   Altered Mental Status and UTI                 RUR Score:     34%      PCP: First and Last name:  Tyshawn Pziano MD     Name of Practice:   Are you a current patient: Yes/No:   Approximate date of last visit: 4/21/2021 - patient goes to PCP at least every 3 months   Can you do a virtual visit with your PCP:  Yes             Resources/supports as identified by patient/family:   None identified -  is primary caregiver - children also assist when needed                Top Challenges facing patient (as identified by patient/family and CM): Finances/Medication cost?      Verified Medicare Part A&B and [address] for 30 day Rx and [employer] for maintenance medications              Transportation?  or family provide all transport              Support system or lack thereof? Family, friends, insurance                     Living arrangements? Private one story home - 2 steps and front and one step to rear deck -            Self-care/ADLs/Cognition? Patient has assistance from  as needed.   She has been independent with all ADL's up until approximately 2 days prior to admission when she became much more drowsy. [facility] stopped services approximately 3 weeks ago and patient was up walking with rollator - able to wash dishes and do laundry at that time. Current Advanced Directive/Advance Care Plan:  Full Code      Healthcare Decision Maker:   Advance Care Planning     General Advance Care Planning (ACP) Conversation      Date of Conversation: 5/20/2021  Conducted with: Patient with Decision Making Capacity  Also confirmed with patient's spouse    Healthcare Decision Maker:     Click here to complete [address] including selection of the Healthcare Decision Maker Relationship (ie \"Primary\")  Today we documented Decision Maker(s) consistent with ACP documents on file. Content/Action Overview: Has ACP document(s) on file - reflects the patient's care preferences  Reviewed DNR/DNI and patient elects Full Code (Attempt Resuscitation)  Topics discussed: Confirmation of documents on file - ACP for mPOA and POST form      Length of Voluntary ACP Conversation in minutes:  25 minutes    Jorge Garcia RN       Payor Source Payor: Arnold Stuart / Plan: Marivel Cuff / Product Type: Medicare /                             Plan for utilizing home health:    Patient has had [facility] in the past - [address] [facility] [address] offered and referral sent to same agency per patient and her spouse choice                 Transition of Care Plan:                  CM in hospital room to see patient following Covid protocol - introduced self and role of CM. Patient was alert and oriented to person and place and able to confirm all demographic information and past medical Hx. All information was confirmed via telephone with patient's  (with her permission) due to her altered mental status on admission. Patient has used [facility] in the past and wishes to continue with their services going forward.   Patient was last seen approximately 3 weeks ago by [place], but has followed up with PCP, Neurology and Cardiology. Patient uses CPAP ([employer]) at home at night and also has rollator , BSC and shower chair.  is primary caregiver with additional family support from children and he also provides all transportation for patient. Current plan is for patient to return home w/ [facility] [place] and  to transport home. CM will continue to follow for additional discharge needs. Care Management Interventions  PCP Verified by CM: Yes (Dr. Jong Payton)  Transition of Care Consult (CM Consult):  Other (Initial CM Assessment)  Current Support Network: Lives with Spouse, Own Home  Confirm Follow Up Transport: Family  The Plan for Transition of Care is Related to the Following Treatment Goals : Home Health  The Patient and/or Patient Representative was Provided with a Choice of Provider and Agrees with the Discharge Plan?: Yes  Freedom of Choice List was Provided with Basic Dialogue that Supports the Patient's Individualized Plan of Care/Goals, Treatment Preferences and Shares the Quality Data Associated with the Providers?: Yes  Discharge Location  Discharge Placement: Home with home health ([facility] [place])    Jason Rodriguez RN, BSN, [address] [place] [address]    Coordinator  [phone]

## 2021-05-20 NOTE — PROGRESS NOTES
Pharmacy Medication Reconciliation     The patient was interviewed regarding current PTA medication list, use and drug allergies; Only the patient was present in room and obtained permission from patient to discuss drug regimen. The patient was questioned regarding use of any other inhalers, topical products, over the counter medications, herbal medications, vitamin products or ophthalmic/nasal/otic medication use. Allergy Update: Sulfa (sulfonamide antibiotics) and Amoxicillin    Recommendations/Findings: The following amendments were made to the patient's active medication list on file at [facility]:   1) Additions:   Carvedilol 25mg po bid    2) Deletions:   none    3) Changes:   She reports using the Estrogen vaginal cream daily vs just Tuesday and Thursday  She has a Symbicort inhaler for asthma but doesn't use it all the time  Area/wound on Leg where she was applying Bactroban has healed. Pertinent Findings: The Ferrous sulfate is Every other Day. -Clarified PTA med list with Patient. PTA medication list was corrected to the following:     Prior to Admission Medications   Prescriptions Last Dose Informant Taking? SYMBICORT 160-4.5 mcg/actuation HFAA   No   Sig: INHALE 2 PUFFS BY MOUTH TWICE DAILY   albuterol (PROVENTIL HFA, VENTOLIN HFA, PROAIR HFA) 90 mcg/actuation inhaler 5/12/2021 at Unknown time Self Yes   Sig: Take 1 Puff by inhalation every four (4) hours as needed for Wheezing. Patient taking differently: Take 1 Puff by inhalation two (2) times daily as needed for Wheezing. aspirin 81 mg chewable tablet 5/12/2021 at Unknown time Self Yes   Sig: Take 81 mg by mouth daily. atorvastatin (LIPITOR) 40 mg tablet 5/12/2021 at Unknown time Self Yes   Sig: Take 1 Tab by mouth nightly. carvediloL (COREG) 25 mg tablet   Yes   Sig: Take 25 mg by mouth two (2) times daily (with meals).    cholecalciferol (Vitamin D3) 25 mcg (1,000 unit) cap 5/12/2021 at Unknown time Self Yes   Sig: Take 1,000 Units by mouth daily. clonazePAM (KlonoPIN) 0.5 mg tablet 2021 at Unknown time  Yes   Sig: TAKE 1 TABLET BY MOUTH NIGHTLY . DO NOT EXCEED  0.5  MG  PER  DAY   conjugated estrogens (PREMARIN) 0.625 mg/gram vaginal cream 2021 at Unknown time Self Yes   Sig: Insert 0.5 g into vagina two (2) times a week. Using Daily   cyclobenzaprine (FLEXERIL) 10 mg tablet 2021 at Unknown time  Yes   Sig: TAKE 1 TABLET BY MOUTH THREE TIMES DAILY AS NEEDED FOR MUSCLE SPASM   dabigatran etexilate (PRADAXA) 150 mg capsule 2021 at Unknown time Self Yes   Sig: Take 1 Cap by mouth two (2) times a day. IF NO BLEEDING AT CATH SITE.   eucalyptus-peppermint oil (Ponaris) soln 2021 at Unknown time  Yes   Si-2 Drops by Nasal route nightly as needed. ferrous sulfate 325 mg (65 mg iron) tablet 2021 at Unknown time  Yes   Sig: Take 1 Tab by mouth every other day. hydrALAZINE (APRESOLINE) 25 mg tablet 2021 at Unknown time  Yes   Sig: Take 75 mg by mouth three (3) times daily. Indications: high blood pressure   isosorbide mononitrate ER (IMDUR) 60 mg CR tablet 2021 at Unknown time  Yes   Sig: Take 1 Tab by mouth daily. magnesium oxide (MAG-OX) 400 mg tablet 2021 at Unknown time  Yes   Sig: Take 1 tablet by mouth twice daily   Patient taking differently: daily. mupirocin (BACTROBAN) 2 % ointment 2021 at Unknown time  Yes   Sig: Apply  to affected area daily. pantoprazole (PROTONIX) 40 mg tablet 2021 at Unknown time  Yes   Sig: Take 1 Tab by mouth daily. potassium chloride SR (KLOR-CON 10) 10 mEq tablet 2021 at Unknown time  Yes   Sig: Take 1 Tab by mouth three (3) times daily. Patient taking differently: Take 10 mEq by mouth four (4) times daily. pregabalin (Lyrica) 150 mg capsule 2021 at Unknown time  Yes   Sig: Take 150 mg by mouth two (2) times a day.    sodium chloride (AYR SALINE) 0.65 % nasal squeeze bottle 2021 at Unknown time Self Yes   Si Sprays by Both Nostrils route every eight (8) hours as needed. torsemide (DEMADEX) 20 mg tablet 5/12/2021 at Unknown time  Yes   Sig: Take 1 Tab by mouth daily. triamcinolone acetonide (KENALOG) 0.1 % topical cream 5/12/2021 at Unknown time  Yes   Sig: APPLY A THIN FILM OF CREAM EXTERNALLY TO AFFECTED AREA TWICE DAILY   venlafaxine-SR (EFFEXOR XR) 150 mg capsule 5/12/2021 at Unknown time Self Yes   Sig: Take 150 mg by mouth two (2) times daily (with meals).       Facility-Administered Medications: None        Thank you,  Alessandra Cooper, [facility]

## 2021-05-20 NOTE — PROGRESS NOTES
Received message from patient's nurse Angel Jacobomira stating :    Patient is hypotensive BP is 90/50 day shift Nurse reported lethargy but seems currently more alert. She is normally hypertensive. Discussion / orders:    08/09/20    ECHO ADULT COMPLETE 08/11/2020 8/11/2020    Interpretation Summary  · LV: Dilated left ventricle. Mild concentric hypertrophy. Severe systolic function. Estimated left ventricular ejection fraction is 35 - 40%. Visually measured ejection fraction. Left ventricular diastolic dysfunction. · LA: Mildly dilated left atrium. · RV: Not well visualized. · RA: Dilated right atrium. · MV: Mitral annular calcification. Trace mitral valve regurgitation is present. · TV: Mild to moderate tricuspid valve regurgitation is present. · Pericardium: Trivial pericardial effusion. · AV: Aortic valve sclerosis. Aortic valve leaflet calcification present. Aortic valve mean gradient is 9.3 mmHg. Aortic valve area is 1.4 cm2.  · PA: Mild to moderate pulmonary hypertension. Pulmonary arterial systolic pressure is 43 mmHg. Signed by: Ginny Chow MD on 8/11/2020  6:17 PM      Patient Vitals for the past 12 hrs:   Temp Pulse Resp BP SpO2   05/19/21 1956 98.4 °F (36.9 °C) 80 19 (!) 90/50 92 %   05/19/21 1637 (!) 100.8 °F (38.2 °C) 80 19 (!) 123/55 (!) 89 %   05/19/21 1517 (!) 103 °F (39.4 °C) 81 20 (!) 157/81 94 %       · Albumin bolus 25% 25 g x 1 dose             Please note that this note was dictated using Dragon computer voice recognition software. Quite often unanticipated grammatical, syntax, homophones, and other interpretive errors are inadvertently transcribed by the computer software. Please disregard these errors. Please excuse any errors that have escaped final proofreading.

## 2021-05-20 NOTE — DIABETES MGMT
[address] [facility]    CLINICAL NURSE SPECIALIST CONSULT     Initial Presentation   Mihaela Quiñones is a 78 y.o. female admitted from the ER 5/19/21 after patient fell at home. Complained of back pain. AMS +. Slurred speech. Lower abdominal pain+. Could not sit. Afebrile. Hypertensive. 02 sats 96%  LAB: WBC 10.5.  with AG 7. Creatinine 1.89 with GFR of 26. Liver enzymes Normal. Alcohol Negative. Troponin Negative. Urinalysis with many cells, blood and nitrates. Urine glucose & ketones Negative. CXR:   There is cardiomegaly and mild pulmonary vascular congestion as evidenced by   increased size of vessels in the upper lung zones. No pneumonia or pulmonary   edema. CT HEAD: No acute process or change compared to the prior exam.  CT ABD:  Bilateral nonobstructing renal stones. No ureteral stone or hydronephrosis. No   acute abnormality. HX:   Past Medical History:   Diagnosis Date    Anxiety 1/22/2018    Arthritis     OA    Asthma     Diabetes ([facility] [address]     Essential hypertension     GERD (gastroesophageal reflux disease)     Hypercholesterolemia 1/22/2018    Hypertension     Ill-defined condition     diverticulitis    Long-term use of high-risk medication 1/22/2018    Neuropathy     Obesity (BMI 30-39.9) 4/30/2019    Other ill-defined conditions(799.89)     IBS, spinal stenosis    Plantar fasciitis     Psychiatric disorder     depression, anxiety    Sleep apnea     uses CPAP    Type 2 diabetes mellitus with diabetic neuropathy, without long-term current use of insulin ([address] 6/5/2016      DX: Acute cystitis. Encephalopathy. Polypharmacy    Treatment plan     TX: ABx. BP management. GI & Clot prevention    Hospital course   Clinical progress has been uncomplicated. Diabetes    Patient has known Type 2 diabetes (diagnosed in 2014 when she was having an MI), treated with nothing at this time.  States she was on glimiperide for a period of time but nothing for sometime. Family history positive for diabetes in sister. Admission  and A1c 6% indicate acceptable diabetes control. Ambulatory blood glucose management provided by primary care provider. Consulted by Provider for advanced diabetes nursing assessment and care, specifically related to   [x] Home management assessment    Diabetes-related medical history  Neurological complications  Peripheral neuropathy  Macrovascular disease  CAD and Myocardial infarction  Other associated conditions     Depression Left cataract (not mature)    Diabetes medication history - NOTHING  Drug class Currently in use Discontinued Never used   Biguanide      DDP-4 inhibitor       Sulfonylurea      Thiazolidinedione      GLP-1 RA      SGLT-2 inhibitors      Basal insulin      Bolus insulin      Fixed Dose  Combinations        Subjective   I found out that I had diabetes in 2014 when I had a heart attack. They had to put a stent in, and a pacer, and then a defibrillator.     Patient reports the following home diabetes self-care practices:  Eating pattern - Her  is responsible for meals   [x] Breakfast Yogurt with a banana  [x] Lunch  Pizza or hamburgers with FF  [x] Dinner  [place] or a hot meal  [x] Beverages Water  Physical activity pattern - Has long-term back problems that are treated with steroid injections. Uses Rollator to prevent falls  Monitoring pattern - Her  checks BGs; they are in the 100s  Taking medications pattern - Not on medication     Objective   Physical exam  General Obese female  Neuro  Alert, oriented but with slow speech. Ill-appearing. Cooperative  Vital Signs Afebrile.  Hypertensive  Visit Vitals  BP (!) 132/108   Pulse 80   Temp 98.6 °F (37 °C)   Resp 18   Ht 5' 9\" (1.753 m)   Wt 122.5 kg (270 lb)   SpO2 97%   BMI 39.87 kg/m²     Laboratory  Tests Today 5/19/21   A1c  6%    2277   Anion gap 5    Serum triglycerides     WBC 5.9 10.5   Serum creatinine 2.03 1.89   GFR 24 26   AST 33 30   ALT 21 27   Ammonia   28     Factors impacting BG management  Factor Dose Comments   Nutrition:  Carb-controlled meals   60 grams/meal   Good appetite   Pain  Rated 0   Infection: Cystitis Rocephin Q24 hrs Afebrile. WBC Normal   Other: AYLIN       Blood glucose pattern      Assessment and Plan   Nursing Diagnosis Risk for unstable blood glucose pattern   Nursing Intervention Domain 2141 Decision-making Support   Nursing Interventions Examined current inpatient diabetes control   Explored factors facilitating and impeding inpatient management  Identified self-management practices impeding diabetes control     Evaluation   This  female, with Type 2 diabetes (since 2014), did achieve diabetes control prior to admission, as evidenced by admission A1c of 6%. During this hospitalization, the patient has achieved inpatient blood glucose target of 100-180mg/dl after re-hydration. The factor that could play a role in blood glucose management is:  [x]  Kidney dysfunction    Suspect that we can use corrective insulin to manage BGs at least for the next 24 hours. Recommendations     [x] Use of Subcutaneous Insulin Order set (9442)  Insulin Dosing Specific recommendation   Basal                                      (Based on weight, BMI & GFR)     Nutritional                                      (Based on CHO/dextrose load)     Corrective                                       (Useful in adjusting insulin dosing)   [x] HIGH sensitivity      Billing Code(s)   [x] 86059 IP subsequent hospital care - 35 minutes     Before making these care recommendations, I personally reviewed the hospitalization record, including notes, laboratory & diagnostic data and current medications, and examined the patient at the bedside (circumstances permitting) before making care recommendations.      Total minutes: 121 TARYN Teran  Diabetes Clinical Nurse Specialist  [facility]  Access via [address] [place] [address]

## 2021-05-20 NOTE — PROGRESS NOTES
Blood culture reviewed. Patient with 2/4 bottles + for GNR. Bacteremia    - Continue ceftriaxone 1 gm IV q24h. - Repeat blood cultures.

## 2021-05-20 NOTE — PROGRESS NOTES
Bedside and Verbal shift change report given to Cherylene Reins, RN (oncoming nurse) by Carmella Yousif RN (offgoing nurse). Report included the following information SBAR, Kardex, Intake/Output, MAR and Recent Results.

## 2021-05-20 NOTE — PROGRESS NOTES
Problem: Falls - Risk of  Goal: *Absence of Falls  Description: Document Janina Reese Fall Risk and appropriate interventions in the flowsheet. Outcome: Progressing Towards Goal  Note: Fall Risk Interventions:  Mobility Interventions: Communicate number of staff needed for ambulation/transfer, Patient to call before getting OOB    Mentation Interventions: Bed/chair exit alarm, Increase mobility, More frequent rounding    Medication Interventions: Patient to call before getting OOB, Teach patient to arise slowly    Elimination Interventions: Call light in reach, Patient to call for help with toileting needs, Toilet paper/wipes in reach    History of Falls Interventions: Bed/chair exit alarm, Door open when patient unattended, Vital signs minimum Q4HRs X 24 hrs (comment for end date)         Problem: Patient Education: Go to Patient Education Activity  Goal: Patient/Family Education  Outcome: Progressing Towards Goal     Problem: Pressure Injury - Risk of  Goal: *Prevention of pressure injury  Description: Document Preet Scale and appropriate interventions in the flowsheet. Outcome: Progressing Towards Goal  Note: Pressure Injury Interventions:  Sensory Interventions: Assess changes in LOC, Float heels, Keep linens dry and wrinkle-free, Turn and reposition approx.  every two hours (pillows and wedges if needed)    Moisture Interventions: Check for incontinence Q2 hours and as needed, Internal/External urinary devices    Activity Interventions: Increase time out of bed, Pressure redistribution bed/mattress(bed type)    Mobility Interventions: Float heels, HOB 30 degrees or less, Pressure redistribution bed/mattress (bed type)    Nutrition Interventions: Document food/fluid/supplement intake                     Problem: Patient Education: Go to Patient Education Activity  Goal: Patient/Family Education  Outcome: Progressing Towards Goal     Problem: General Infection Care Plan (Adult and Pediatric)  Goal: Improvement in signs and symptoms of infection  Outcome: Progressing Towards Goal  Goal: *Optimize nutritional status  Outcome: Progressing Towards Goal     Problem: Patient Education: Go to Patient Education Activity  Goal: Patient/Family Education  Outcome: Progressing Towards Goal     Problem: Pain  Goal: *Control of Pain  Outcome: Progressing Towards Goal  Goal: *PALLIATIVE CARE:  Alleviation of Pain  Outcome: Progressing Towards Goal     Problem: Patient Education: Go to Patient Education Activity  Goal: Patient/Family Education  Outcome: Progressing Towards Goal

## 2021-05-20 NOTE — PROGRESS NOTES
4886 Received call from lab about critical results.      Steffanie 45 message sent to AMANDA Serna; \"blood cultures cane back positive: gram negative rids in 2/4 vials\"

## 2021-05-21 LAB
ALBUMIN SERPL-MCNC: 2.4 G/DL (ref 3.5–5)
ALBUMIN/GLOB SERPL: 0.7 {RATIO} (ref 1.1–2.2)
ALP SERPL-CCNC: 129 U/L (ref 45–117)
ALT SERPL-CCNC: 21 U/L (ref 12–78)
ANION GAP SERPL CALC-SCNC: 5 MMOL/L (ref 5–15)
APTT PPP: 29.4 SEC (ref 22.1–31)
APTT PPP: 35.7 SEC (ref 22.1–31)
APTT PPP: 54.1 SEC (ref 22.1–31)
AST SERPL-CCNC: 35 U/L (ref 15–37)
BACTERIA SPEC CULT: ABNORMAL
BASOPHILS # BLD: 0 K/UL (ref 0–0.1)
BASOPHILS NFR BLD: 0 % (ref 0–1)
BILIRUB SERPL-MCNC: 0.6 MG/DL (ref 0.2–1)
BUN SERPL-MCNC: 38 MG/DL (ref 6–20)
BUN/CREAT SERPL: 20 (ref 12–20)
CALCIUM SERPL-MCNC: 8.5 MG/DL (ref 8.5–10.1)
CC UR VC: ABNORMAL
CHLORIDE SERPL-SCNC: 110 MMOL/L (ref 97–108)
CO2 SERPL-SCNC: 27 MMOL/L (ref 21–32)
CREAT SERPL-MCNC: 1.9 MG/DL (ref 0.55–1.02)
DIFFERENTIAL METHOD BLD: ABNORMAL
EOSINOPHIL # BLD: 0.2 K/UL (ref 0–0.4)
EOSINOPHIL NFR BLD: 3 % (ref 0–7)
ERYTHROCYTE [DISTWIDTH] IN BLOOD BY AUTOMATED COUNT: 16.3 % (ref 11.5–14.5)
GLOBULIN SER CALC-MCNC: 3.5 G/DL (ref 2–4)
GLUCOSE BLD STRIP.AUTO-MCNC: 146 MG/DL (ref 65–117)
GLUCOSE BLD STRIP.AUTO-MCNC: 160 MG/DL (ref 65–117)
GLUCOSE BLD STRIP.AUTO-MCNC: 177 MG/DL (ref 65–117)
GLUCOSE BLD STRIP.AUTO-MCNC: 194 MG/DL (ref 65–117)
GLUCOSE SERPL-MCNC: 132 MG/DL (ref 65–100)
HCT VFR BLD AUTO: 30.3 % (ref 35–47)
HGB BLD-MCNC: 9.3 G/DL (ref 11.5–16)
IMM GRANULOCYTES # BLD AUTO: 0 K/UL (ref 0–0.04)
IMM GRANULOCYTES NFR BLD AUTO: 1 % (ref 0–0.5)
LYMPHOCYTES # BLD: 0.6 K/UL (ref 0.8–3.5)
LYMPHOCYTES NFR BLD: 13 % (ref 12–49)
MCH RBC QN AUTO: 28.4 PG (ref 26–34)
MCHC RBC AUTO-ENTMCNC: 30.7 G/DL (ref 30–36.5)
MCV RBC AUTO: 92.7 FL (ref 80–99)
MONOCYTES # BLD: 0.7 K/UL (ref 0–1)
MONOCYTES NFR BLD: 14 % (ref 5–13)
NEUTS SEG # BLD: 3.4 K/UL (ref 1.8–8)
NEUTS SEG NFR BLD: 69 % (ref 32–75)
NRBC # BLD: 0 K/UL (ref 0–0.01)
NRBC BLD-RTO: 0 PER 100 WBC
PLATELET # BLD AUTO: 106 K/UL (ref 150–400)
PMV BLD AUTO: 12.4 FL (ref 8.9–12.9)
POTASSIUM SERPL-SCNC: 3.3 MMOL/L (ref 3.5–5.1)
PROT SERPL-MCNC: 5.9 G/DL (ref 6.4–8.2)
RBC # BLD AUTO: 3.27 M/UL (ref 3.8–5.2)
SERVICE CMNT-IMP: ABNORMAL
SODIUM SERPL-SCNC: 142 MMOL/L (ref 136–145)
THERAPEUTIC RANGE,PTTT: ABNORMAL SECS (ref 58–77)
THERAPEUTIC RANGE,PTTT: ABNORMAL SECS (ref 58–77)
THERAPEUTIC RANGE,PTTT: NORMAL SECS (ref 58–77)
WBC # BLD AUTO: 5 K/UL (ref 3.6–11)

## 2021-05-21 PROCEDURE — 82962 GLUCOSE BLOOD TEST: CPT

## 2021-05-21 PROCEDURE — 74011250637 HC RX REV CODE- 250/637: Performed by: STUDENT IN AN ORGANIZED HEALTH CARE EDUCATION/TRAINING PROGRAM

## 2021-05-21 PROCEDURE — 99231 SBSQ HOSP IP/OBS SF/LOW 25: CPT | Performed by: CLINICAL NURSE SPECIALIST

## 2021-05-21 PROCEDURE — 74011250636 HC RX REV CODE- 250/636: Performed by: NURSE PRACTITIONER

## 2021-05-21 PROCEDURE — 74011636637 HC RX REV CODE- 636/637: Performed by: STUDENT IN AN ORGANIZED HEALTH CARE EDUCATION/TRAINING PROGRAM

## 2021-05-21 PROCEDURE — 97166 OT EVAL MOD COMPLEX 45 MIN: CPT

## 2021-05-21 PROCEDURE — 74011000258 HC RX REV CODE- 258: Performed by: NURSE PRACTITIONER

## 2021-05-21 PROCEDURE — 2709999900 HC NON-CHARGEABLE SUPPLY

## 2021-05-21 PROCEDURE — 85025 COMPLETE CBC W/AUTO DIFF WBC: CPT

## 2021-05-21 PROCEDURE — 74011250637 HC RX REV CODE- 250/637: Performed by: NURSE PRACTITIONER

## 2021-05-21 PROCEDURE — 65660000000 HC RM CCU STEPDOWN

## 2021-05-21 PROCEDURE — 80053 COMPREHEN METABOLIC PANEL: CPT

## 2021-05-21 PROCEDURE — 97535 SELF CARE MNGMENT TRAINING: CPT

## 2021-05-21 PROCEDURE — 74011250637 HC RX REV CODE- 250/637: Performed by: INTERNAL MEDICINE

## 2021-05-21 PROCEDURE — 36415 COLL VENOUS BLD VENIPUNCTURE: CPT

## 2021-05-21 PROCEDURE — 85730 THROMBOPLASTIN TIME PARTIAL: CPT

## 2021-05-21 RX ORDER — CLONAZEPAM 0.5 MG/1
0.5 TABLET ORAL
Status: DISCONTINUED | OUTPATIENT
Start: 2021-05-21 | End: 2021-05-24 | Stop reason: HOSPADM

## 2021-05-21 RX ORDER — PREGABALIN 150 MG/1
300 CAPSULE ORAL 2 TIMES DAILY
Status: DISCONTINUED | OUTPATIENT
Start: 2021-05-21 | End: 2021-05-24 | Stop reason: HOSPADM

## 2021-05-21 RX ORDER — POTASSIUM CHLORIDE 750 MG/1
40 TABLET, FILM COATED, EXTENDED RELEASE ORAL
Status: COMPLETED | OUTPATIENT
Start: 2021-05-21 | End: 2021-05-21

## 2021-05-21 RX ORDER — SODIUM,POTASSIUM PHOSPHATES 280-250MG
2 POWDER IN PACKET (EA) ORAL 2 TIMES DAILY
Status: COMPLETED | OUTPATIENT
Start: 2021-05-21 | End: 2021-05-22

## 2021-05-21 RX ADMIN — CARVEDILOL 25 MG: 12.5 TABLET, FILM COATED ORAL at 16:28

## 2021-05-21 RX ADMIN — Medication 10 ML: at 05:07

## 2021-05-21 RX ADMIN — HYDRALAZINE HYDROCHLORIDE 50 MG: 50 TABLET, FILM COATED ORAL at 16:28

## 2021-05-21 RX ADMIN — POTASSIUM & SODIUM PHOSPHATES POWDER PACK 280-160-250 MG 2 PACKET: 280-160-250 PACK at 18:14

## 2021-05-21 RX ADMIN — HYDRALAZINE HYDROCHLORIDE 50 MG: 50 TABLET, FILM COATED ORAL at 21:30

## 2021-05-21 RX ADMIN — PREGABALIN 300 MG: 150 CAPSULE ORAL at 18:15

## 2021-05-21 RX ADMIN — CEFTRIAXONE 1 G: 1 INJECTION, POWDER, FOR SOLUTION INTRAMUSCULAR; INTRAVENOUS at 18:14

## 2021-05-21 RX ADMIN — INSULIN LISPRO 2 UNITS: 100 INJECTION, SOLUTION INTRAVENOUS; SUBCUTANEOUS at 11:58

## 2021-05-21 RX ADMIN — INSULIN LISPRO 2 UNITS: 100 INJECTION, SOLUTION INTRAVENOUS; SUBCUTANEOUS at 09:48

## 2021-05-21 RX ADMIN — Medication 10 ML: at 21:30

## 2021-05-21 RX ADMIN — PREGABALIN 300 MG: 150 CAPSULE ORAL at 12:37

## 2021-05-21 RX ADMIN — INSULIN LISPRO 2 UNITS: 100 INJECTION, SOLUTION INTRAVENOUS; SUBCUTANEOUS at 16:28

## 2021-05-21 RX ADMIN — Medication 10 ML: at 14:00

## 2021-05-21 RX ADMIN — ATORVASTATIN CALCIUM 40 MG: 40 TABLET, FILM COATED ORAL at 21:30

## 2021-05-21 RX ADMIN — POTASSIUM CHLORIDE 40 MEQ: 750 TABLET, EXTENDED RELEASE ORAL at 09:23

## 2021-05-21 RX ADMIN — POTASSIUM & SODIUM PHOSPHATES POWDER PACK 280-160-250 MG 2 PACKET: 280-160-250 PACK at 11:58

## 2021-05-21 RX ADMIN — PANTOPRAZOLE SODIUM 40 MG: 40 TABLET, DELAYED RELEASE ORAL at 09:23

## 2021-05-21 RX ADMIN — ISOSORBIDE MONONITRATE 60 MG: 30 TABLET, EXTENDED RELEASE ORAL at 09:22

## 2021-05-21 RX ADMIN — HEPARIN SODIUM 4000 UNITS: 5000 INJECTION INTRAVENOUS; SUBCUTANEOUS at 03:42

## 2021-05-21 RX ADMIN — CLONAZEPAM 0.5 MG: 0.5 TABLET ORAL at 21:30

## 2021-05-21 RX ADMIN — HEPARIN SODIUM 16.1 UNITS/KG/HR: 10000 INJECTION, SOLUTION INTRAVENOUS at 12:10

## 2021-05-21 RX ADMIN — CARVEDILOL 25 MG: 12.5 TABLET, FILM COATED ORAL at 09:21

## 2021-05-21 RX ADMIN — HYDRALAZINE HYDROCHLORIDE 50 MG: 50 TABLET, FILM COATED ORAL at 09:22

## 2021-05-21 RX ADMIN — ASPIRIN 81 MG: 81 TABLET, CHEWABLE ORAL at 09:23

## 2021-05-21 NOTE — PROGRESS NOTES
Primary care for this patient was provided by Bob Melo LPN and supervised by kirill Cazares RN. I agree with all assessment data charted and all nursing interventions conducted. I was present for all interventions and reassessments, appropriate nursing care provided.         Anthony Cazares RN

## 2021-05-21 NOTE — DIABETES MGMT
[address] [facility]     CLINICAL NURSE SPECIALIST CONSULT      Initial Presentation   Mihaela Quiñones is a 78 y.o. female admitted from the ER 5/19/21 after patient fell at home. Complained of back pain. AMS +. Slurred speech. Lower abdominal pain+. Could not sit. Afebrile. Hypertensive. 02 sats 96%  LAB: WBC 10.5.  with AG 7. Creatinine 1.89 with GFR of 26. Liver enzymes Normal. Alcohol Negative. Troponin Negative. Urinalysis with many cells, blood and nitrates. Urine glucose & ketones Negative. CXR:   There is cardiomegaly and mild pulmonary vascular congestion as evidenced by   increased size of vessels in the upper lung zones. No pneumonia or pulmonary   edema. CT HEAD: No acute process or change compared to the prior exam.  CT ABD:  Bilateral nonobstructing renal stones. No ureteral stone or hydronephrosis. No   acute abnormality.      DX: Acute cystitis. Encephalopathy. Polypharmacy     Diabetes    Patient has known Type 2 diabetes (diagnosed in 2014 when she was having an MI), treated with nothing at this time. States she was on glimiperide for a period of time but nothing for sometime. Family history positive for diabetes in sister. Admission  and A1c 6% indicate acceptable diabetes control. Ambulatory blood glucose management provided by primary care provider. RECOMMENDATION   BGs are well controlled on corrective insulin.    No further intervention required   Will sign off     Nicole Harper DNP, RN, ACNS-BC, BC-ADM, [facility]   Clinical Nurse Specialist-Diabetes & Endocrine disorders     [facility] (In-patient CNS consult service)   [phone]

## 2021-05-21 NOTE — PROGRESS NOTES
Problem: Self Care Deficits Care Plan (Adult)  Goal: *Acute Goals and Plan of Care (Insert Text)  Outcome: Progressing Towards Goal  Note: FUNCTIONAL STATUS PRIOR TO ADMISSION:      HOME SUPPORT: Pt was independent with self care tasks. Just graduated from [facility] 3 weeks ago and was independent with walker use. Lives with     Occupational Therapy Goals  Initiated 5/21/2021  1. Patient will perform ADLs standing 5 mins without fatigue or LOB with supervision/set-up within 7day(s). 2.  Patient will perform lower body ADLs with supervision/set-up within 7day(s). 3.  Patient will perform toilet transfers with supervision/set-up within 5 day(s). 4.  Patient will perform all aspects of toileting with supervision/set-up within 7 day(s). 5. Pt will participate in therapeutic activities to improve endurance for 10 mins in standing within 7 days. OCCUPATIONAL THERAPY EVALUATION  Patient: Phoenix Verma (43 y.o. female)  Date: 5/21/2021  Primary Diagnosis: UTI (urinary tract infection) [N39.0]  Polypharmacy [Z79.899]        Precautions:  fall, contact precautions    ASSESSMENT  Based on the objective data described below, the patient presents with deconditioned, fall at home, MRSA , hx back pain, Degenerative lumbar spine in hx, renal stones. Pt was cooperative. Reports she does not remember how she wound up on the floor. She presents with decreased strength, endurance and balance needing CGA for ambulation, toilet transfer, bed transfer sit to stand but needed min assist to get chair to stand. She was independent in getting on socks, CGA standing at sink to brush hair and teeth. She is unable to snap her gown but can put arms in and pull up. She could benefit from acute OT therapy to improve ADL skills to pre-hospitalization level of independence with rollator in home.     Current Level of Function Impacting Discharge (ADLs/self-care): Pt needs CGA in standing, needed Min assist to get up from chair after sitting for extended periods and CGA for toilet and bed transfer. Uses bedrails to get into bed and position self. Functional Outcome Measure: The patient scored 75 on the Barthel outcome measure which is indicative of Mild. Other factors to consider for discharge: Pt did well following previous admission with intensive home health and support from . Patient will benefit from skilled therapy intervention to address the above noted impairments. PLAN :  Recommendations and Planned Interventions: self care training, functional mobility training, therapeutic exercise, therapeutic activities, endurance activities, and patient education    Frequency/Duration: Patient will be followed by occupational therapy 5 times a week to address goals. Recommendation for discharge: (in order for the patient to meet his/her long term goals)  Occupational therapy at least 2 days/week in the home AND ensure assist and/or supervision for safety with ambulation/transfers    This discharge recommendation:  Has been made in collaboration with the attending provider and/or case management    IF patient discharges home will need the following DME: TBD prior to d/c       SUBJECTIVE:   Patient stated I have been up since about 10:30.  Pt Id's with name/ and is thought to be reliable  of recent therapy progress although she has no memory of fall in home or if she bumped her head. Was found by  sitting up on floor.     OBJECTIVE DATA SUMMARY:   HISTORY:   Past Medical History:   Diagnosis Date    Anxiety 2018    Arthritis     OA    Asthma     Diabetes ([address]     Essential hypertension     GERD (gastroesophageal reflux disease)     Hypercholesterolemia 2018    Hypertension     Ill-defined condition     diverticulitis    Long-term use of high-risk medication 2018    Neuropathy     Obesity (BMI 30-39.9) 2019    Other ill-defined conditions(799.89)     IBS, spinal stenosis Plantar fasciitis     Psychiatric disorder     depression, anxiety    Sleep apnea     uses CPAP    Type 2 diabetes mellitus with diabetic neuropathy, without long-term current use of insulin ([facility] [address] 6/5/2016     Past Surgical History:   Procedure Laterality Date    COLONOSCOPY N/A 3/14/2018    COLONOSCOPY performed by Harinder Ojeda MD at \[facility]\"" ENDOSCOPY    HX APPENDECTOMY      HX CHOLECYSTECTOMY  11/15/2018    HX HYSTERECTOMY      HX PACEMAKER      IR KYPHOPLASTY LUMBAR  12/22/2020    IR KYPHOPLASTY THORACIC  1/6/2021    ME CARDIAC SURG PROCEDURE UNLIST      stent       Expanded or extensive additional review of patient history:     Home Situation  Home Environment: Private residence  # Steps to Enter: 2  Rails to Enter: Yes  Hand Rails : Bilateral  Wheelchair Ramp: Yes  One/Two Story Residence: One story  Living Alone: No  Support Systems: Family member(s)  Patient Expects to be Discharged to[de-identified] Private residence  Current DME Used/Available at Home: [employer], rollator, Cane, straight, CPAP, Grab bars, Commode, bedside, Shower chair (grabbarsshower)  Tub or Shower Type: Tub/Shower combination    Hand dominance: Right    EXAMINATION OF PERFORMANCE DEFICITS:  Cognitive/Behavioral Status:  Neurologic State: Alert  Orientation Level: Oriented X4  Cognition: Follows commands        Safety/Judgement: Awareness of environment    Skin: Discoloration BLE below knees and pt reports hx seeping. Multiple needle stick bruises visible dorsum both hands, forearms. Edema: Edema visible BLE shin area but blanches with refill non-pitting    Hearing: Auditory  Auditory Impairment: None       Corrective Lenses: Glasses    Range of Motion:    AROM: Within functional limits     Strength:    Strength: Generally decreased, functional    Coordination:  Coordination: Generally decreased, functional (cannot snap clothing)  Fine Motor Skills-Upper: Left Intact; Right Intact    Gross Motor Skills-Upper: Left Intact; Right Intact    Tone & Sensation:    Tone: Normal      Balance:  Sitting: Intact  Standing: With support    Functional Mobility and Transfers for ADLs:  Bed Mobility:  Sit to Supine: Modified independent  Scooting: Modified independent    Transfers:  Sit to Stand: Minimum assistance  Stand to Sit: Contact guard assistance  Toilet Transfer : Contact guard assistance    ADL Assessment:    Oral Facial Hygiene/Grooming: Contact guard assistance    Upper Body Dressing: Minimum assistance (cannot snap gown, able to pull up onto arms)    Lower Body Dressing: Independent (seated crossed leg)    ADL Intervention and task modifications:    Grooming  Position Performed: Standing  Brushing Teeth: Contact guard assistance  Brushing/Combing Hair: Contact guard assistance  Adaptive Equipment: Other (comment) (held onto sink)    Upper Body [address]: Minimum  assistance    Lower Body Dressing Assistance  Socks: Independent    Cognitive Retraining  Safety/Judgement: Awareness of environment      Functional Measure  Barthel Index:    Bathin  Bladder: 10  Bowels: 10  Groomin (if in standing needs CGA)  Dressin  Feeding: 10  Mobility: 10  Stairs: 5  Toilet Use: 10  Transfer (Bed to Chair and Back): 10  Total: 75/100        The Barthel ADL Index: Guidelines  1. The index should be used as a record of what a patient does, not as a record of what a patient could do. 2. The main aim is to establish degree of independence from any help, physical or verbal, however minor and for whatever reason. 3. The need for supervision renders the patient not independent. 4. A patient's performance should be established using the best available evidence. Asking the patient, friends/relatives and nurses are the usual sources, but direct observation and common sense are also important. However direct testing is not needed.   5. Usually the patient's performance over the preceding 24-48 hours is important, but occasionally longer periods will be relevant. 6. Middle categories imply that the patient supplies over 50 per cent of the effort. 7. Use of aids to be independent is allowed. Marlen Lyons, Barthel, D.W. (7544). Functional evaluation: the Barthel Index. [address] [facility] (14)2. ENRIQUE Gonzalez Carolynn Fortune., Jennifer Cary, [address] (1999). Measuring the change indisability after inpatient rehabilitation; comparison of the responsiveness of the Barthel Index and Functional [place]arrison Measure. Journal of Neurology, Neurosurgery, and Psychiatry, 66(4), 648-705. Edwin Conley, N.J.A, PAWAN Izaguirre, & Irma Barry MMarleneA. (2004.) Assessment of post-stroke quality of life in cost-effectiveness studies: The usefulness of the Barthel Index and the EuroQoL-5D. Quality of Life Research, 15, 304-08        Occupational Therapy Evaluation Charge Determination   History Examination Decision-Making   MEDIUM Complexity : Expanded review of history including physical, cognitive and psychosocial  history  MEDIUM Complexity : 3-5 performance deficits relating to physical, cognitive , or psychosocial skils that result in activity limitations and / or participation restrictions MEDIUM Complexity : Patient may present with comorbidities that affect occupational performnce. Miniml to moderate modification of tasks or assistance (eg, physical or verbal ) with assesment(s) is necessary to enable patient to complete evaluation       Based on the above components, the patient evaluation is determined to be of the following complexity level: MEDIUM  Pain Rating:  Did not give pain rating, was asked and no complaint    Activity Tolerance:   Fair    After treatment patient left in no apparent distress:    Supine in bed, Heels elevated for pressure relief, Call bell within reach, and Side rails x 3    COMMUNICATION/EDUCATION:   The patients plan of care was discussed with: Registered nurse.      Patient/family have participated as able in goal setting and plan of care. This patients plan of care is appropriate for delegation to CINDA.     Thank you for this referral.  Nerissa Red OT  Time Calculation: 29 mins

## 2021-05-21 NOTE — PROGRESS NOTES
Re:  Apixaban dosing    Patients with ESRD with or without hemodialysis were not studied in clinical efficacy and safety studies with ELIQUIS; therefore, the dosing recommendation for patients with nonvalvular atrial fibrillation is based on pharmacokinetic and pharmacodynamic (anti-Factor Xa activity) data in subjects with ESRD maintained on dialysis. The recommended dose for ESRD patients maintained with hemodialysis is 5 mg orally twice daily. For ESRD patients maintained with hemodialysis with one of the following patient characteristics, age ? 80 years or body weight ?60 kg, reduce dose to 2.5 mg twice daily    Pt = 122.5 kg, Age = 78 yrs  CKD III    Patient would qualify for an initial 5 mg po BID Apixaban dosing regimen.       Yamila Munoz, [facility]

## 2021-05-21 NOTE — PROGRESS NOTES
1900--bedside report received from rose hernandez, pt sitting up in chair oriented x 4, has no complaints at this time, heparin drip verified with with offgoing RN, call bell in [facility] assessment as noted    1945--verified ptt and heparin rate change with pharmist devyn    2200--unable to get home cpap to turn on, pt placed on 3L, o2 stats 97%    2300--new purewick placed    0300--pt denies pain, nausea, am labs drawn and sent to lab per orders, call bell in [facility], o2 stats 98% 3L    0700--bedside report given to [address], rn who is assuming care of pt  0430--ptt results pending    0455--ptt 70.8, no changed, confirmed with pharmacist, Corinne Savage

## 2021-05-21 NOTE — PROGRESS NOTES
[facility] spoke with Maren Easley the pharmacist regarding PTT results. Plan to increase to 16.1U/Kg/H and skip the bolus to avoid overcorrection. Will redraw Ptt 6 hours from rate change.       Jade Waters RN

## 2021-05-21 NOTE — PROGRESS NOTES
[facility]  Progress Note        NAME: Veronica Reveles       :  1950       MRN:  [id number]     Date/Time: 2021    Risk of deterioration: medium       Assessment:    Plan:  GNR Bacteremia  UTI  AYLIN/CKD 3-due to above/other comorbidities  Hypokalemia  HTN  CMO/EF 35%, pulm htn/tr  DM  OBesity  (B) non obstructing stones Creatinine up to 2 with GNR bacteremia. Today 1.9  On Rocephin  Afebrile now, wbc improving  Replete K--neutrophos  CT:  Bilateral nonobstructing renal stones. No ureteral stone or hydronephrosis. No  acute abnormality. Heplock IV  Platelets/ H&H down-likely due to sepsis             Subjective:     Chief Complaint: I need help with my CPAP    Review of Systems: (+)hx of uti, (+) dysuria PTA  (-) n/v/cp/sob    Objective:     VITALS:   Last 24hrs VS reviewed since prior progress note.  Most recent are:  Visit Vitals  /77   Pulse 80   Temp 98.3 °F (36.8 °C)   Resp 17   Ht 5' 9\" (1.753 m)   Wt 122.5 kg (270 lb)   SpO2 96%   BMI 39.87 kg/m²     SpO2 Readings from Last 6 Encounters:   21 96%   21 96%   21 97%   21 96%   21 96%   21 96%            Intake/Output Summary (Last 24 hours) at 2021 1024  Last data filed at 2021 0549  Gross per 24 hour   Intake 240 ml   Output 950 ml   Net -710 ml        Telemetry Reviewed     PHYSICAL EXAM:    General   well developed, obese,  appears stated age, in no acute distress  EENT  Normocephalic, Atraumatic, EOMI  Respiratory   Clear To Auscultation bilaterally   Cardiology  Regular Rate and Rythmn    Abdominal  Soft, non-tender, non-distended  Extremities  No clubbing, cyanosis, tr edema             Lab Data Reviewed: (see below)    Medications Reviewed: (see below)    PMH/SH reviewed - no change compared to H&P    ___________________________________________________    Attending Physician: Wendi Dawkins, MD     ____________________________________________________  MEDICATIONS:  Current Facility-Administered Medications   Medication Dose Route Frequency    potassium, sodium phosphates (NEUTRA-PHOS) packet 2 Packet  2 Packet Oral BID    cefTRIAXone (ROCEPHIN) 1 g in 0.9% sodium chloride (MBP/ADV) 50 mL MBP  1 g IntraVENous Q24H    heparin 25,000 units in D5W 250 ml infusion  8.1-25 Units/kg/hr IntraVENous TITRATE    heparin (porcine) injection 2,000 Units  2,000 Units IntraVENous PRN    Or    heparin (porcine) injection 4,000 Units  4,000 Units IntraVENous PRN    sodium chloride (NS) flush 5-40 mL  5-40 mL IntraVENous Q8H    sodium chloride (NS) flush 5-40 mL  5-40 mL IntraVENous PRN    acetaminophen (TYLENOL) tablet 650 mg  650 mg Oral Q6H PRN    Or    acetaminophen (TYLENOL) suppository 650 mg  650 mg Rectal Q6H PRN    polyethylene glycol (MIRALAX) packet 17 g  17 g Oral DAILY PRN    promethazine (PHENERGAN) tablet 12.5 mg  12.5 mg Oral Q6H PRN    Or    ondansetron (ZOFRAN) injection 4 mg  4 mg IntraVENous Q6H PRN    aspirin chewable tablet 81 mg  81 mg Oral DAILY    atorvastatin (LIPITOR) tablet 40 mg  40 mg Oral QHS    carvediloL (COREG) tablet 25 mg  25 mg Oral BID WITH MEALS    isosorbide mononitrate ER (IMDUR) tablet 60 mg  60 mg Oral DAILY    hydrALAZINE (APRESOLINE) tablet 50 mg  50 mg Oral TID    pantoprazole (PROTONIX) tablet 40 mg  40 mg Oral ACB    albuterol-ipratropium (DUO-NEB) 2.5 MG-0.5 MG/3 ML  3 mL Nebulization Q6H PRN    lactated Ringers infusion  50 mL/hr IntraVENous CONTINUOUS    glucose chewable tablet 16 g  4 Tablet Oral PRN    dextrose (D50W) injection syrg 12.5-25 g  25-50 mL IntraVENous PRN    glucagon (GLUCAGEN) injection 1 mg  1 mg IntraMUSCular PRN    insulin lispro (HUMALOG) injection   SubCUTAneous AC&HS        LABS:  Recent Labs     05/21/21  0254 05/20/21  0335   WBC 5.0 5.9   HGB 9.3* 9.6*   HCT 30.3* 30.8*   * 105*     Recent Labs     05/21/21  0254 05/20/21  0335 05/19/21  1005    142 142   K 3.3* 3.2* 3.3*   * 108 108   CO2 27 29 27   BUN 38* 37* 33*   CREA 1.90* 2.03* 1.89*   * 135* 227*   CA 8.5 8.6 9.4   MG  --  1.8  --    PHOS  --  2.8  --      Recent Labs     05/21/21  0254 05/20/21  0335 05/19/21  1005   ALT 21 21 27   * 139* 185*   TBILI 0.6 0.8 1.2*   TP 5.9* 6.1* 7.0   ALB 2.4* 2.6* 3.1*   GLOB 3.5 3.5 3.9     Recent Labs     05/21/21  0254 05/20/21  1856   APTT 35.7* 32.0*      No results for input(s): FE, TIBC, PSAT, FERR in the last 72 hours. No results for input(s): PH, PCO2, PO2 in the last 72 hours.   Recent Labs     05/19/21  1705   TROIQ 0.20*     Lab Results   Component Value Date/Time    Glucose (POC) 146 (H) 05/21/2021 06:53 AM    Glucose (POC) 165 (H) 05/20/2021 08:53 PM    Glucose (POC) 160 (H) 05/20/2021 11:16 AM    Glucose (POC) 143 (H) 05/20/2021 07:09 AM    Glucose (POC) 160 (H) 05/19/2021 09:27 PM

## 2021-05-21 NOTE — PROGRESS NOTES
End of Shift Note    Bedside shift change report given to Ronda (oncoming nurse) by Kenyon David (offgoing nurse). Report included the following information SBAR, Kardex, Intake/Output, MAR and Recent Results    Shift worked:  [id number]     Shift summary and any significant changes:     Pt up in the chair most of the shift. No complaints of pain.  at bedside most of the day. No signs of active bleeding. Heparin titrated per MAR. No other significant changes noted this shift. Concerns for physician to address:  none     Zone phone for oncoming shift:   [phone]       Activity:  Activity Level: Up with Assistance  Number times ambulated in hallways past shift: 0  Number of times OOB to chair past shift: 3    Cardiac:   Cardiac Monitoring: Yes      Cardiac Rhythm: Sinus Rhythm    Access:   Current line(s): PIV and midline     Genitourinary:   Urinary status: voiding    Respiratory:   O2 Device: None (Room air)  Chronic home O2 use?: NO  Incentive spirometer at bedside: YES     GI:  Last Bowel Movement Date: 05/21/21  Current diet:  DIET DIABETIC CONSISTENT CARB Regular  Passing flatus: YES  Tolerating current diet: YES       Pain Management:   Patient states pain is manageable on current regimen: YES    Skin:  Preet Score: 18  Interventions: increase time out of bed    Patient Safety:  Fall Score:  Total Score: 5  Interventions: gripper socks and pt to call before getting OOB  High Fall Risk: Yes    Length of Stay:  Expected LOS: 4d 19h  Actual LOS: [address]

## 2021-05-21 NOTE — PROGRESS NOTES
End of Shift Note    Bedside shift change report given to [address] [place] [address] (oncoming nurse) by Michel Schneider RN (offgoing nurse). Report included the following information SBAR, Kardex, ED Summary, Intake/Output, MAR and Recent Results    Shift worked:  5365-7388     Shift summary and any significant changes: On Heparin drip. No signs of bleeding     Concerns for physician to address:       Zone phone for oncoming shift:   [phone]       Activity:  Activity Level: Up with Assistance  Number times ambulated in hallways past shift: 0  Number of times OOB to chair past shift: 0    Cardiac:   Cardiac Monitoring: Yes      Cardiac Rhythm: Sinus Rhythm    Access:   Current line(s): PIV     Genitourinary:   Urinary status: voiding and external catheter    Respiratory:   O2 Device: None (Room air)  Chronic home O2 use?: NO  Incentive spirometer at bedside: NO     GI:  Last Bowel Movement Date: 05/19/21  Current diet:  DIET DIABETIC CONSISTENT CARB Regular  Passing flatus: YES  Tolerating current diet: YES       Pain Management:   Patient states pain is manageable on current regimen: YES    Skin:  Preet Score: 18  Interventions: increase time out of bed and internal/external urinary devices    Patient Safety:  Fall Score:  Total Score: 5  Interventions: assistive device (walker, cane, etc), gripper socks, pt to call before getting OOB and stay with me (per policy)  High Fall Risk: Yes    Length of Stay:  Expected LOS: - - -  Actual LOS: Ally Santo, RN

## 2021-05-21 NOTE — PROGRESS NOTES
Hospitalist Progress Note    NAME: Sid Davis   :  1950   MRN:  [id number]     I reviewed pertinent labs/imaging and discussed plan of care with Attending Physician who is in agreement. Assessment / Plan:  Acute metabolic encephalopathy, improved   Polypharmacy  UTI  Bacteremia   CT head 21:  No acute process or change compared to the prior exam.  CT abdomen/pelvis 21:  Bilateral nonobstructing renal stones. No ureteral stone or hydronephrosis. No acute abnormality. UA 21:  Turbid, large blood, large LE, negative nitrite, 4+ bact. Urine culture 21:  >100,000 Col/mL GNR. Blood culture 21:  GNR in 4/4 bottles. Blood culture 21:  NG at 12 hours. - Encephalopathy significantly improved today. - Patient has been taking pregabalin at home PTA but according to her  has been overtaking this medication (double or triple dose). She is also on clonazepam and recently received a prescription for tramadol and lorazepam.  - [place]:     - Pregabalin 300 mg #180 for 90 day supply, last filled 21.     - Clonazepam 0.5 mg #30 for 30 day supply, last filled 21.     - Tramadol 50 mg #28 for 7 day supply, filled 21.     - Lorazepam 1 mg #2 for 1 day supply, filled 21.  - Continue ceftriaxone 1 gm IV daily. CAD  HTN  Dyslipidemia   Cardiomyopathy   Chronic systolic heart failure  PAF  Mild troponin elevation   - Continue asa 81 mg po daily. - Continue carvedilol 25 mg po bid. - Continue hydralazine 50 mg po tid. - Continue isosorbide mononitrate ER 60 mg po daily. - Continue atorvastatin 40 mg po daily. - Continue to hold dabigatran for now 2/2 renal function.    - Heparin gtt started 21.  - Hold diuretic for now 2/2 renal function. CKD stage III  Diabetic nephropathy   - Nephrology input appreciated. - Continue to avoid nephrotoxins.  - Adjust meds based on CrCl. - Monitor.       Valentino non-obstructing ureteral stones  - No tx indicated at this time. Anxiety  - Resume clonazepam 0.5 mg po daily at hs to avoid BZO withdrawal.    Hypokalemia   - Supplement. - Monitor. Anemia of chronic disease   Thrombocytopenia  - No obvious sign of active hemorrhage.    - Thrombocytopenia likely related to infection.   - No tx indicated at this time. - Monitor. DM II  Hyperglycemia   Diabetic neuropathy  - IP diabetes team input appreciated. - Continue FSBS with SSI correction scale. - Resume pregabalin 300 mg po bid. SHAYLA  - Continue CPAP at hs. GERD  - Continue pantoprazole 40 mg po daily. Mild splenomegaly on CT  - OP follow up.        30.0 - 39.9 Obese / Body mass index is 39.87 kg/m². Estimated discharge date:  TBD  Barriers:  None    Code status: Full  Prophylaxis: Lovenox  Recommended Disposition: Home w/Family     Subjective:     Chief Complaint / Reason for Physician Visit  Reports LE neuropathic pain. Plan of care and pertinent events reviewed with bedside nurse. Review of Systems:  Symptom Y/N Comments  Symptom Y/N Comments   Fever/Chills Y   Chest Pain N    Poor Appetite N   Edema Y    Cough N   Abdominal Pain Y    Sputum N   Joint Pain N    SOB/STEEN N   Pruritis/Rash N    Nausea/vomit N   Tolerating PT/OT     Diarrhea N   Tolerating Diet Y    Constipation N   Other Y Neuropathy     Could NOT obtain due to:      Objective:     VITALS:   Last 24hrs VS reviewed since prior progress note.  Most recent are:  Patient Vitals for the past 24 hrs:   Temp Pulse Resp BP SpO2   05/21/21 0800 -- 80 -- -- --   05/21/21 0743 -- -- -- -- 96 %   05/21/21 0308 98.3 °F (36.8 °C) 80 17 128/77 96 %   05/20/21 2252 99.4 °F (37.4 °C) 78 16 (!) 132/56 96 %   05/20/21 1921 99.8 °F (37.7 °C) 82 17 (!) 144/72 96 %   05/20/21 1605 99.1 °F (37.3 °C) 81 18 134/60 99 %   05/20/21 1600 -- 81 -- -- --   05/20/21 1150 98.6 °F (37 °C) 80 18 (!) 132/108 97 %   05/20/21 1146 -- 80 -- -- --       Intake/Output Summary (Last 24 hours) at 5/21/2021 1101  Last data filed at 5/21/2021 5032  Gross per 24 hour   Intake 240 ml   Output 950 ml   Net -710 ml        I had a face to face encounter and independently examined this patient on 5/21/2021, as outlined below:  PHYSICAL EXAM:  General:  A/A/O x 3. NAD. HEENT:  Normocephalic. Sclera anicteric. EOMI. Mucous membranes moist.    Chest:  Resps even/unlabored with symmetrical CWE. Air entry diminished at bases. Lungs CTA. No use of accessory muscles. CV:  RRR. Normal S1/S2. Heart tones difficult to assess 2/2 body habitus and positioning. No JVD. Trace pretibial edema tello. GI:  Abdomen obese/NT. No organomegaly. No hernia. ABT X 4.    :  Voiding. Neurologic:  Nonfocal.  CN II-XII grossly intact. Face symmetrical.  Speech normal.     Psych:  Cooperative. No anxiety or agitation. No suicidal/homicidal ideation. Skin:  No jaundice. Chronic edema skin color changes noted on tello LEs. Reviewed most current lab test results and cultures  YES  Reviewed most current radiology test results   YES  Review and summation of old records today    NO  Reviewed patient's current orders and MAR    YES  PMH/SH reviewed - no change compared to H&P  ________________________________________________________________________  Care Plan discussed with:    Comments   Patient [address]     Consultant                        Multidiciplinary team rounds were held today with , nursing, pharmacist and clinical coordinator. Patient's plan of care was discussed; medications were reviewed and discharge planning was addressed. ________________________________________________________________________  Tonja Roper NP     Procedures: see electronic medical records for all procedures/Xrays and details which were not copied into this note but were reviewed prior to creation of Plan.       LABS:  I reviewed today's most current labs and imaging studies.   Pertinent labs include:  Recent Labs     05/21/21  0254 05/20/21  0335 05/19/21  1005   WBC 5.0 5.9 10.5   HGB 9.3* 9.6* 11.5   HCT 30.3* 30.8* 36.7   * 105* 139*     Recent Labs     05/21/21  0254 05/20/21  0335 05/19/21  1005    142 142   K 3.3* 3.2* 3.3*   * 108 108   CO2 27 29 27   * 135* 227*   BUN 38* 37* 33*   CREA 1.90* 2.03* 1.89*   CA 8.5 8.6 9.4   MG  --  1.8  --    PHOS  --  2.8  --    ALB 2.4* 2.6* 3.1*   TBILI 0.6 0.8 1.2*   ALT 21 21 27       Signed: Grace Martinez NP

## 2021-05-22 LAB
ANION GAP SERPL CALC-SCNC: 3 MMOL/L (ref 5–15)
APTT PPP: 62.7 SEC (ref 22.1–31)
APTT PPP: 70.8 SEC (ref 22.1–31)
BACTERIA SPEC CULT: ABNORMAL
BASOPHILS # BLD: 0 K/UL (ref 0–0.1)
BASOPHILS NFR BLD: 0 % (ref 0–1)
BUN SERPL-MCNC: 36 MG/DL (ref 6–20)
BUN/CREAT SERPL: 20 (ref 12–20)
CALCIUM SERPL-MCNC: 8.7 MG/DL (ref 8.5–10.1)
CHLORIDE SERPL-SCNC: 107 MMOL/L (ref 97–108)
CO2 SERPL-SCNC: 30 MMOL/L (ref 21–32)
CREAT SERPL-MCNC: 1.8 MG/DL (ref 0.55–1.02)
DIFFERENTIAL METHOD BLD: ABNORMAL
EOSINOPHIL # BLD: 0.3 K/UL (ref 0–0.4)
EOSINOPHIL NFR BLD: 5 % (ref 0–7)
ERYTHROCYTE [DISTWIDTH] IN BLOOD BY AUTOMATED COUNT: 16.5 % (ref 11.5–14.5)
GLUCOSE BLD STRIP.AUTO-MCNC: 146 MG/DL (ref 65–117)
GLUCOSE BLD STRIP.AUTO-MCNC: 147 MG/DL (ref 65–117)
GLUCOSE BLD STRIP.AUTO-MCNC: 165 MG/DL (ref 65–117)
GLUCOSE BLD STRIP.AUTO-MCNC: 166 MG/DL (ref 65–117)
GLUCOSE SERPL-MCNC: 160 MG/DL (ref 65–100)
HCT VFR BLD AUTO: 33.4 % (ref 35–47)
HGB BLD-MCNC: 9.9 G/DL (ref 11.5–16)
IMM GRANULOCYTES # BLD AUTO: 0.1 K/UL (ref 0–0.04)
IMM GRANULOCYTES NFR BLD AUTO: 1 % (ref 0–0.5)
LYMPHOCYTES # BLD: 0.9 K/UL (ref 0.8–3.5)
LYMPHOCYTES NFR BLD: 18 % (ref 12–49)
MAGNESIUM SERPL-MCNC: 2.3 MG/DL (ref 1.6–2.4)
MCH RBC QN AUTO: 28 PG (ref 26–34)
MCHC RBC AUTO-ENTMCNC: 29.6 G/DL (ref 30–36.5)
MCV RBC AUTO: 94.6 FL (ref 80–99)
MONOCYTES # BLD: 0.6 K/UL (ref 0–1)
MONOCYTES NFR BLD: 12 % (ref 5–13)
NEUTS SEG # BLD: 3.1 K/UL (ref 1.8–8)
NEUTS SEG NFR BLD: 64 % (ref 32–75)
NRBC # BLD: 0 K/UL (ref 0–0.01)
NRBC BLD-RTO: 0 PER 100 WBC
PLATELET # BLD AUTO: 129 K/UL (ref 150–400)
PMV BLD AUTO: 12.4 FL (ref 8.9–12.9)
POTASSIUM SERPL-SCNC: 4.1 MMOL/L (ref 3.5–5.1)
RBC # BLD AUTO: 3.53 M/UL (ref 3.8–5.2)
SERVICE CMNT-IMP: ABNORMAL
SODIUM SERPL-SCNC: 140 MMOL/L (ref 136–145)
THERAPEUTIC RANGE,PTTT: ABNORMAL SECS (ref 58–77)
THERAPEUTIC RANGE,PTTT: ABNORMAL SECS (ref 58–77)
WBC # BLD AUTO: 4.9 K/UL (ref 3.6–11)

## 2021-05-22 PROCEDURE — 74011250637 HC RX REV CODE- 250/637: Performed by: NURSE PRACTITIONER

## 2021-05-22 PROCEDURE — 80048 BASIC METABOLIC PNL TOTAL CA: CPT

## 2021-05-22 PROCEDURE — 74011250636 HC RX REV CODE- 250/636: Performed by: NURSE PRACTITIONER

## 2021-05-22 PROCEDURE — 97116 GAIT TRAINING THERAPY: CPT

## 2021-05-22 PROCEDURE — 36415 COLL VENOUS BLD VENIPUNCTURE: CPT

## 2021-05-22 PROCEDURE — 97162 PT EVAL MOD COMPLEX 30 MIN: CPT

## 2021-05-22 PROCEDURE — 74011636637 HC RX REV CODE- 636/637: Performed by: STUDENT IN AN ORGANIZED HEALTH CARE EDUCATION/TRAINING PROGRAM

## 2021-05-22 PROCEDURE — 74011250637 HC RX REV CODE- 250/637: Performed by: STUDENT IN AN ORGANIZED HEALTH CARE EDUCATION/TRAINING PROGRAM

## 2021-05-22 PROCEDURE — 85025 COMPLETE CBC W/AUTO DIFF WBC: CPT

## 2021-05-22 PROCEDURE — 82962 GLUCOSE BLOOD TEST: CPT

## 2021-05-22 PROCEDURE — 85730 THROMBOPLASTIN TIME PARTIAL: CPT

## 2021-05-22 PROCEDURE — 65660000000 HC RM CCU STEPDOWN

## 2021-05-22 PROCEDURE — 74011250637 HC RX REV CODE- 250/637: Performed by: INTERNAL MEDICINE

## 2021-05-22 PROCEDURE — 77010033678 HC OXYGEN DAILY

## 2021-05-22 PROCEDURE — 74011000258 HC RX REV CODE- 258: Performed by: NURSE PRACTITIONER

## 2021-05-22 PROCEDURE — 83735 ASSAY OF MAGNESIUM: CPT

## 2021-05-22 PROCEDURE — 97530 THERAPEUTIC ACTIVITIES: CPT

## 2021-05-22 RX ADMIN — ATORVASTATIN CALCIUM 40 MG: 40 TABLET, FILM COATED ORAL at 21:02

## 2021-05-22 RX ADMIN — ACETAMINOPHEN 650 MG: 325 TABLET ORAL at 20:45

## 2021-05-22 RX ADMIN — ASPIRIN 81 MG: 81 TABLET, CHEWABLE ORAL at 09:10

## 2021-05-22 RX ADMIN — CARVEDILOL 25 MG: 12.5 TABLET, FILM COATED ORAL at 16:48

## 2021-05-22 RX ADMIN — Medication 10 ML: at 06:26

## 2021-05-22 RX ADMIN — Medication 10 ML: at 14:00

## 2021-05-22 RX ADMIN — POTASSIUM & SODIUM PHOSPHATES POWDER PACK 280-160-250 MG 2 PACKET: 280-160-250 PACK at 09:10

## 2021-05-22 RX ADMIN — ISOSORBIDE MONONITRATE 60 MG: 30 TABLET, EXTENDED RELEASE ORAL at 09:09

## 2021-05-22 RX ADMIN — PREGABALIN 300 MG: 150 CAPSULE ORAL at 09:09

## 2021-05-22 RX ADMIN — INSULIN LISPRO 2 UNITS: 100 INJECTION, SOLUTION INTRAVENOUS; SUBCUTANEOUS at 09:13

## 2021-05-22 RX ADMIN — ACETAMINOPHEN 650 MG: 325 TABLET ORAL at 09:08

## 2021-05-22 RX ADMIN — HYDRALAZINE HYDROCHLORIDE 50 MG: 50 TABLET, FILM COATED ORAL at 16:48

## 2021-05-22 RX ADMIN — INSULIN LISPRO 2 UNITS: 100 INJECTION, SOLUTION INTRAVENOUS; SUBCUTANEOUS at 16:44

## 2021-05-22 RX ADMIN — HEPARIN SODIUM 17.1 UNITS/KG/HR: 10000 INJECTION, SOLUTION INTRAVENOUS at 23:34

## 2021-05-22 RX ADMIN — CARVEDILOL 25 MG: 12.5 TABLET, FILM COATED ORAL at 09:10

## 2021-05-22 RX ADMIN — CEFTRIAXONE 1 G: 1 INJECTION, POWDER, FOR SOLUTION INTRAMUSCULAR; INTRAVENOUS at 17:52

## 2021-05-22 RX ADMIN — POTASSIUM & SODIUM PHOSPHATES POWDER PACK 280-160-250 MG 2 PACKET: 280-160-250 PACK at 17:52

## 2021-05-22 RX ADMIN — HYDRALAZINE HYDROCHLORIDE 50 MG: 50 TABLET, FILM COATED ORAL at 21:02

## 2021-05-22 RX ADMIN — HYDRALAZINE HYDROCHLORIDE 50 MG: 50 TABLET, FILM COATED ORAL at 09:10

## 2021-05-22 RX ADMIN — PREGABALIN 300 MG: 150 CAPSULE ORAL at 17:52

## 2021-05-22 RX ADMIN — HEPARIN SODIUM 17.1 UNITS/KG/HR: 10000 INJECTION, SOLUTION INTRAVENOUS at 11:51

## 2021-05-22 RX ADMIN — INSULIN LISPRO 2 UNITS: 100 INJECTION, SOLUTION INTRAVENOUS; SUBCUTANEOUS at 12:21

## 2021-05-22 RX ADMIN — PANTOPRAZOLE SODIUM 40 MG: 40 TABLET, DELAYED RELEASE ORAL at 09:09

## 2021-05-22 RX ADMIN — Medication 10 ML: at 21:03

## 2021-05-22 RX ADMIN — HEPARIN SODIUM 17.1 UNITS/KG/HR: 10000 INJECTION, SOLUTION INTRAVENOUS at 00:11

## 2021-05-22 RX ADMIN — CLONAZEPAM 0.5 MG: 0.5 TABLET ORAL at 21:02

## 2021-05-22 NOTE — PROGRESS NOTES
Problem: Mobility Impaired (Adult and Pediatric)  Goal: *Acute Goals and Plan of Care (Insert Text)  Description: FUNCTIONAL STATUS PRIOR TO ADMISSION: Patient was modified independent using a rollator for functional mobility. HOME SUPPORT PRIOR TO ADMISSION: The patient lived with spouse but did not require assist.    Physical Therapy Goals  Initiated 5/22/2021  1. Patient will move from supine to sit and sit to supine , scoot up and down, and roll side to side in bed with modified independence within 7 day(s). 2.  Patient will transfer from bed to chair and chair to bed with modified independence using the least restrictive device within 7 day(s). 3.  Patient will perform sit to stand with modified independence within 7 day(s). 4.  Patient will ambulate with supervision/set-up for 150 feet with the least restrictive device within 7 day(s).        5/22/2021 1333 by Ron Tavera, PT  Outcome: Progressing Towards Goal  5/22/2021 1330 by Ron Tavera, PT  Outcome: Progressing Towards Goal   PHYSICAL THERAPY EVALUATION  Patient: Nadja Gonzalez (77 y.o. female)  Date: 5/22/2021  Primary Diagnosis: UTI (urinary tract infection) [N39.0]  Polypharmacy [Z79.899]        Precautions:   Fall, Back (hx of compression fractures)    ASSESSMENT  Based on the objective data described below, the patient presents with A&Ox4. CGA for sit to stand, stand to sit, bed to chair, and ambulation with a RW. Ambulated for 85 ft on room air, able to sit on the toilet for the first time. Expressed fatigue after using the bathroom and was eager to get back in the chair. Current Level of Function Impacting Discharge (mobility/balance): CGA for mobility, RW    Functional Outcome Measure: The patient scored 65/100 on the Barthel outcome measure which is indicative of 35% impairment.       Other factors to consider for discharge: safe home environment, spouse support, uses rollator at home     Patient will benefit from skilled therapy intervention to address the above noted impairments. PLAN :  Recommendations and Planned Interventions: bed mobility training, transfer training, gait training, therapeutic exercises, patient and family training/education, and therapeutic activities      Frequency/Duration: Patient will be followed by physical therapy:  4 times a week to address goals. Recommendation for discharge: (in order for the patient to meet his/her long term goals)  Physical therapy at least 2 days/week in the home AND ensure assist and/or supervision for safety with mobility    This discharge recommendation:  Has been made in collaboration with the attending provider and/or case management    IF patient discharges home will need the following DME: none         SUBJECTIVE:   Patient stated I would like to continue therapy at home.     OBJECTIVE DATA SUMMARY:   HISTORY:    Past Medical History:   Diagnosis Date    Anxiety 1/22/2018    Arthritis     OA    Asthma     Diabetes ([facility] [address]     Essential hypertension     GERD (gastroesophageal reflux disease)     Hypercholesterolemia 1/22/2018    Hypertension     Ill-defined condition     diverticulitis    Long-term use of high-risk medication 1/22/2018    Neuropathy     Obesity (BMI 30-39.9) 4/30/2019    Other ill-defined conditions(799.89)     IBS, spinal stenosis    Plantar fasciitis     Psychiatric disorder     depression, anxiety    Sleep apnea     uses CPAP    Type 2 diabetes mellitus with diabetic neuropathy, without long-term current use of insulin ([facility] [address] 6/5/2016     Past Surgical History:   Procedure Laterality Date    COLONOSCOPY N/A 3/14/2018    COLONOSCOPY performed by Daryle German, MD at [facility] ENDOSCOPY    HX APPENDECTOMY      HX CHOLECYSTECTOMY  11/15/2018    HX HYSTERECTOMY      HX PACEMAKER      IR KYPHOPLASTY LUMBAR  12/22/2020    IR KYPHOPLASTY THORACIC  1/6/2021    OK CARDIAC SURG PROCEDURE UNLIST      stent       Personal factors and/or comorbidities impacting plan of care: OA, neuropathy, lumbar stenosis    Home Situation  Home Environment: Private residence  # Steps to Enter: 2  Rails to Enter: Yes  Hand Rails : Bilateral  Wheelchair Ramp: Yes  One/Two Story Residence: One story  Living Alone: No  Support Systems: Family member(s), Spouse/Significant Other/Partner  Patient Expects to be Discharged to[de-identified] Private residence  Current DME Used/Available at Home: Chelsy Zavala, rollator, Walker, rolling, [address], [place], straight, Shower chair, Grab bars, Commode, bedside, Brace/Splint (bed rail)  Tub or Shower Type: Tub/Shower combination    EXAMINATION/PRESENTATION/DECISION MAKING:   Critical Behavior:  Neurologic State: Alert  Orientation Level: Oriented X4  Cognition: Appropriate decision making, Appropriate safety awareness, Follows commands  Safety/Judgement: Awareness of environment, Good awareness of safety precautions, Fall prevention, Insight into deficits (hx of falls)  Hearing:   Auditory  Auditory Impairment: None  Skin:  hemosiderin staining  Edema: chronic in bilateral LE, moderate  Range Of Motion:  AROM: Within functional limits           PROM: Within functional limits           Strength:    Strength: Generally decreased, functional                    Tone & Sensation:   Tone: Normal              Sensation: Impaired (bilateral LE sock-like loss of sensation )               Coordination:  Coordination: Generally decreased, functional  Vision:   Acuity: Within Defined Limits  Functional Mobility:  Bed Mobility:  Rolling:  (up in chair when PT arrived)           Transfers:  Sit to Stand: Contact guard assistance  Stand to Sit: Contact guard assistance        Bed to Chair: Contact guard assistance              Balance:   Sitting: Intact  Standing: Impaired  Standing - Static: Good;Occasional  Standing - Dynamic : Fair;Constant support  Ambulation/Gait Training:  Distance (ft): 85 Feet (ft)  Assistive Device: Gait belt;Walker, rolling  Ambulation - Level of Assistance: Contact guard assistance     Gait Description (WDL): Exceptions to WDL  Gait Abnormalities: Decreased step clearance;Shuffling gait; Step to gait; Path deviations        Base of Support: Widened     Speed/Brielle: Slow  Step Length: Right shortened;Left shortened  Swing Pattern: Right symmetrical;Left symmetrical     Interventions: Safety awareness training;Verbal cues             Therapeutic Exercises:   Reviewed exercises pt was already doing at home (ankle pumps, SLR)    Functional Measure:  Barthel Index:    Bathin  Bladder: 5  Bowels: 10  Groomin  Dressin  Feeding: 10  Mobility: 10  Stairs: 0  Toilet Use: 10  Transfer (Bed to Chair and Back): 10  Total: 65/100       The Barthel ADL Index: Guidelines  1. The index should be used as a record of what a patient does, not as a record of what a patient could do. 2. The main aim is to establish degree of independence from any help, physical or verbal, however minor and for whatever reason. 3. The need for supervision renders the patient not independent. 4. A patient's performance should be established using the best available evidence. Asking the patient, friends/relatives and nurses are the usual sources, but direct observation and common sense are also important. However direct testing is not needed. 5. Usually the patient's performance over the preceding 24-48 hours is important, but occasionally longer periods will be relevant. 6. Middle categories imply that the patient supplies over 50 per cent of the effort. 7. Use of aids to be independent is allowed. Luan Chapo., Barthel, DMarleneW. (9750). Functional evaluation: the Barthel Index. [facility] (14)2. ENRIQUE Rosenberg, Heidy Kruger., Idea Decree.[facility], [address] [place] [address] (). Measuring the change indisability after inpatient rehabilitation; comparison of the responsiveness of the Barthel Index and Functional [place] Measure. Journal of Neurology, Neurosurgery, and Psychiatry, 66(4), 663-935.   Carlos Valentin Exel, N.J.A, Kaylee Villagran M.A. (2004.) Assessment of post-stroke quality of life in cost-effectiveness studies: The usefulness of the Barthel Index and the EuroQoL-5D. Quality of Life Research, 15, 773-78           Physical Therapy Evaluation Charge Determination   History Examination Presentation Decision-Making   HIGH Complexity :3+ comorbidities / personal factors will impact the outcome/ POC  MEDIUM Complexity : 3 Standardized tests and measures addressing body structure, function, activity limitation and / or participation in recreation  MEDIUM Complexity : Evolving with changing characteristics  Other outcome measures Barthel  MEDIUM      Based on the above components, the patient evaluation is determined to be of the following complexity level: MEDIUM    Pain Rating:  Chronic low back pain    Activity Tolerance:   Fair, SpO2 stable on RA, and requires rest breaks    After treatment patient left in no apparent distress:   Sitting in chair, Heels elevated for pressure relief, Call bell within reach, and Caregiver / family present    COMMUNICATION/EDUCATION:   The patients plan of care was discussed with: Registered nurse. Fall prevention education was provided and the patient/caregiver indicated understanding., Patient/family have participated as able in goal setting and plan of care. , and Patient/family agree to work toward stated goals and plan of care.     Thank you for this referral.  China Calvillo, PT   Time Calculation: 37 mins

## 2021-05-22 NOTE — PROGRESS NOTES
Problem: Falls - Risk of  Goal: *Absence of Falls  Description: Document Grace Pichardo Fall Risk and appropriate interventions in the flowsheet.   Outcome: Resolved/Met  Note: Fall Risk Interventions:  Mobility Interventions: Patient to call before getting OOB    Mentation Interventions: Adequate sleep, hydration, pain control    Medication Interventions: Patient to call before getting OOB    Elimination Interventions: Call light in reach    History of Falls Interventions: Door open when patient unattended

## 2021-05-22 NOTE — PROGRESS NOTES
PTT came back at 62.7 which is therapeutic, pharmacy was called to verify that Heparin IV drip does not need a rate change. PTT to be drawn again with morning labs.

## 2021-05-22 NOTE — PROGRESS NOTES
Hospitalist Progress Note    NAME: Kaden Bergman   :  1950   MRN:  [id number]     I reviewed pertinent labs/imaging and discussed plan of care with Attending Physician who is in agreement. Assessment / Plan:  Acute metabolic encephalopathy, improved   Polypharmacy  UTI  Bacteremia   CT head 21:  No acute process or change compared to the prior exam.  CT abdomen/pelvis 21:  Bilateral nonobstructing renal stones. No ureteral stone or hydronephrosis. No acute abnormality. UA 21:  Turbid, large blood, large LE, negative nitrite, 4+ bact. Urine culture 21:  >100,000 Col/mL E-Coli. Blood culture 21:  E-Coli in 4/4 bottles. Blood culture 21:  NG at 2 days.    - Encephalopathy improved today, patient slightly drowsy. - Patient has been taking pregabalin at home PTA but according to her  has been overtaking this medication (double or triple dose). She is also on clonazepam and recently received a prescription for tramadol and lorazepam.  - [facility] [place]:     - Pregabalin 300 mg #180 for 90 day supply, last filled 21.     - Clonazepam 0.5 mg #30 for 30 day supply, last filled 21.     - Tramadol 50 mg #28 for 7 day supply, filled 21.     - Lorazepam 1 mg #2 for 1 day supply, filled 21.  - Continue ceftriaxone 1 gm IV daily. CAD  HTN  Dyslipidemia   Cardiomyopathy   Chronic systolic heart failure  PAF  Mild troponin elevation   - Continue asa 81 mg po daily. - Continue carvedilol 25 mg po bid. - Continue hydralazine 50 mg po tid. - Continue isosorbide mononitrate ER 60 mg po daily. - Continue atorvastatin 40 mg po daily. - Continue to hold dabigatran for now 2/2 renal function.    - Request CM to check to see if patient's insurance will cover apixaban. - Heparin gtt started 21, continue for now.   - Continue to hold diuretic for now 2/2 renal function, patient does not have significant edema nor does she have any crackles or SOB. CKD stage III, historical baseline Cr 1.3-1.6   Diabetic nephropathy   - Nephrology input appreciated. - Continue to avoid nephrotoxins.  - Adjust meds based on CrCl. - Monitor. Valentino non-obstructing ureteral stones  - No tx indicated at this time. Anxiety  - Continue clonazepam 0.5 mg po daily at hs to avoid BZO withdrawal.    Anemia of chronic disease   Thrombocytopenia  - H/H stable and at historical baseline since initial drop from 11.5 which likely represented hemoconcentration.   - No obvious sign of active hemorrhage.    - Thrombocytopenia likely related to infection.   - No tx indicated at this time. - Continue to monitor. DM II  Hyperglycemia   Diabetic neuropathy  - IP diabetes team input appreciated. - Continue FSBS with SSI correction scale. - Patient continues to complain of neuropathic pain in valentino LEs/feet. - Continue pregabalin 300 mg po bid. SHAYLA  - Continue CPAP at hs. GERD  - Continue pantoprazole 40 mg po daily. Mild splenomegaly on CT  - OP follow up. Hypokalemia   - Supplement prn.  - Monitor. 30.0 - 39.9 Obese / Body mass index is 39.87 kg/m². Estimated discharge date:  TBD  Barriers:  None    Code status: Full  Prophylaxis: Lovenox  Recommended Disposition: Home w/Family     Subjective:     Chief Complaint / Reason for Physician Visit  Reports ontoing LE neuropathic pain. Slightly drowsy this a.m. Plan of care and pertinent events reviewed with bedside nurse. Review of Systems:  Symptom Y/N Comments  Symptom Y/N Comments   Fever/Chills Y   Chest Pain N    Poor Appetite N   Edema Y    Cough N   Abdominal Pain N    Sputum N   Joint Pain N    SOB/STEEN N   Pruritis/Rash N    Nausea/vomit N   Tolerating PT/OT     Diarrhea N   Tolerating Diet Y    Constipation N   Other Y Neuropathy     Could NOT obtain due to:      Objective:     VITALS:   Last 24hrs VS reviewed since prior progress note.  Most recent are:  Patient Vitals for the past 24 hrs:   Temp Pulse Resp BP SpO2   05/22/21 0832 98.6 °F (37 °C) 85 16 115/78 99 %   05/22/21 0419 98.3 °F (36.8 °C) 80 18 129/79 97 %   05/21/21 2358 98.7 °F (37.1 °C) 82 18 120/70 97 %   05/21/21 1927 99.1 °F (37.3 °C) 82 18 134/78 94 %   05/21/21 1547 -- 80 -- -- --   05/21/21 1500 -- -- -- (!) 143/87 --   05/21/21 1453 99.6 °F (37.6 °C) 80 17 112/68 94 %   05/21/21 1208 99 °F (37.2 °C) 92 17 129/77 94 %   05/21/21 1200 -- 80 -- -- --       Intake/Output Summary (Last 24 hours) at 5/22/2021 0911  Last data filed at 5/22/2021 4623  Gross per 24 hour   Intake 1179.43 ml   Output 400 ml   Net 779.43 ml        I had a face to face encounter and independently examined this patient on 5/22/2021, as outlined below:  PHYSICAL EXAM:  General:  A/A. Patient asked if she was \"going to have to go back to the hospital\". Slightly drowsy. NAD. HEENT:  Normocephalic. Sclera anicteric. EOMI. Mucous membranes moist.    Chest:  Resps even/unlabored with symmetrical CWE. Air entry diminished at bases. Lungs CTA. No use of accessory muscles. CV:  RRR. Paced. Normal S1/S2. Heart tones difficult to assess 2/2 body habitus and positioning. No JVD. Trace pretibial edema tello. GI:  Abdomen obese/NT. No organomegaly. No hernia. ABT X 4.    :  Voiding. Neurologic:  Nonfocal.  CN II-XII grossly intact. Face symmetrical.  Speech normal.     Psych:  Cooperative. No anxiety or agitation. No suicidal/homicidal ideation. Skin:  No jaundice. Chronic edema skin color changes noted on tello LEs.         Reviewed most current lab test results and cultures  YES  Reviewed most current radiology test results   YES  Review and summation of old records today    NO  Reviewed patient's current orders and MAR    YES  PMH/SH reviewed - no change compared to H&P  ________________________________________________________________________  Care Plan discussed with:    Comments   Patient Y    Family      RN Y    Care Manager     Consultant                        Multidiciplinary team rounds were held today with , nursing, pharmacist and clinical coordinator. Patient's plan of care was discussed; medications were reviewed and discharge planning was addressed. ________________________________________________________________________  Netta Stern NP     Procedures: see electronic medical records for all procedures/Xrays and details which were not copied into this note but were reviewed prior to creation of Plan. LABS:  I reviewed today's most current labs and imaging studies.   Pertinent labs include:  Recent Labs     05/22/21  0309 05/21/21  0254 05/20/21  0335   WBC 4.9 5.0 5.9   HGB 9.9* 9.3* 9.6*   HCT 33.4* 30.3* 30.8*   * 106* 105*     Recent Labs     05/22/21  0309 05/21/21  0254 05/20/21  0335 05/19/21  1005    142 142 142   K 4.1 3.3* 3.2* 3.3*    110* 108 108   CO2 30 27 29 27   * 132* 135* 227*   BUN 36* 38* 37* 33*   CREA 1.80* 1.90* 2.03* 1.89*   CA 8.7 8.5 8.6 9.4   MG 2.3  --  1.8  --    PHOS  --   --  2.8  --    ALB  --  2.4* 2.6* 3.1*   TBILI  --  0.6 0.8 1.2*   ALT  --  21 21 27       Signed: Netta Stern NP

## 2021-05-22 NOTE — PROGRESS NOTES
End of Shift Note Bedside shift change report given to Ciara Nunez (oncoming nurse) by Cristobal Barker LPN (offgoing nurse). Report included the following information SBAR, Kardex, Intake/Output and MAR Shift worked:  [id number] Shift summary and any significant changes:  
  tolerated ambulation in the room Chair for meals Tolerated meals Tolerated Physical therapy activities Urinating PTT lab today 62.7 (within therapautic range) Concerns for physician to address:  NONE Zone phone for oncoming shift:   NONE Activity: 
Activity Level: Up with Assistance Number times ambulated in hallways past shift: 0 Number of times OOB to chair past shift: 3 Cardiac:  
Cardiac Monitoring: Yes     
Cardiac Rhythm: Sinus Rhythm (paced) Access:  
Current line(s): PIV Genitourinary:  
Urinary status: voiding Respiratory:  
O2 Device: None (Room air) Chronic home O2 use?: NO Incentive spirometer at bedside: YES 
  
GI: 
Last Bowel Movement Date: 05/21/21 Current diet:  DIET DIABETIC CONSISTENT CARB Regular Passing flatus: YES Tolerating current diet: YES Pain Management:  
Patient states pain is manageable on current regimen: YES Skin: 
Preet Score: 18 Interventions: turn team, float heels, increase time out of bed and nutritional support Patient Safety: 
Fall Score: Total Score: 4 Interventions: gripper socks, pt to call before getting OOB and stay with me (per policy) High Fall Risk: Yes Length of Stay: 
Expected LOS: 4d 19h Actual LOS: 3 Ruby Vidal LPN  
86/59/31 
5221

## 2021-05-22 NOTE — PROGRESS NOTES
[facility]  Progress Note        NAME: Arnaldo Srinivasan       :  1950       MRN:  [id number]     Date/Time: 2021    Risk of deterioration: medium       Assessment:    Plan:  GNR Bacteremia  UTI  AYLIN/CKD 3-due to above/other comorbidities - follows with Dr. Michelle Tolliver  Hypokalemia  HTN  CMO/EF 35%, pulm htn/tr  DM  OBesity  (B) non obstructing stones Creatinine slowly better. On Rocephin    Afebrile now, wbc improving    Continue neutraphos    CT:  Bilateral nonobstructing renal stones. No ureteral stone or hydronephrosis. No  acute abnormality. Subjective:     Chief Complaint: \"I feel better now. \"  Less dyspnea. No N/V. No CP. Objective:     VITALS:   Last 24hrs VS reviewed since prior progress note.  Most recent are:  Visit Vitals  /78 (BP 1 Location: Right upper arm, BP Patient Position: At rest)   Pulse 85   Temp 98.6 °F (37 °C)   Resp 16   Ht 5' 9\" (1.753 m)   Wt 122.5 kg (270 lb)   SpO2 99%   BMI 39.87 kg/m²     SpO2 Readings from Last 6 Encounters:   21 99%   21 96%   21 97%   21 96%   21 96%   21 96%    O2 Flow Rate (L/min): 3 l/min       Intake/Output Summary (Last 24 hours) at 2021 0954  Last data filed at 2021 7704  Gross per 24 hour   Intake 1179.43 ml   Output 400 ml   Net 779.43 ml        Telemetry Reviewed     PHYSICAL EXAM:    General   well developed, obese,  appears stated age, in no acute distress  EENT  Normocephalic, Atraumatic, EOMI  Respiratory   Clear To Auscultation bilaterally   Cardiology  Regular Rate and Rythmn    Abdominal  Soft, non-tender, non-distended  Extremities  No clubbing, cyanosis, tr edema             Lab Data Reviewed: (see below)    Medications Reviewed: (see below)    PMH/SH reviewed - no change compared to H&P    ___________________________________________________    Attending Physician: Luis Rock, MD     ____________________________________________________  MEDICATIONS:  Current Facility-Administered Medications   Medication Dose Route Frequency    potassium, sodium phosphates (NEUTRA-PHOS) packet 2 Packet  2 Packet Oral BID    clonazePAM (KlonoPIN) tablet 0.5 mg  0.5 mg Oral QHS    pregabalin (LYRICA) capsule 300 mg  300 mg Oral BID    cefTRIAXone (ROCEPHIN) 1 g in 0.9% sodium chloride (MBP/ADV) 50 mL MBP  1 g IntraVENous Q24H    heparin 25,000 units in D5W 250 ml infusion  8.1-25 Units/kg/hr IntraVENous TITRATE    heparin (porcine) injection 2,000 Units  2,000 Units IntraVENous PRN    Or    heparin (porcine) injection 4,000 Units  4,000 Units IntraVENous PRN    sodium chloride (NS) flush 5-40 mL  5-40 mL IntraVENous Q8H    sodium chloride (NS) flush 5-40 mL  5-40 mL IntraVENous PRN    acetaminophen (TYLENOL) tablet 650 mg  650 mg Oral Q6H PRN    Or    acetaminophen (TYLENOL) suppository 650 mg  650 mg Rectal Q6H PRN    polyethylene glycol (MIRALAX) packet 17 g  17 g Oral DAILY PRN    promethazine (PHENERGAN) tablet 12.5 mg  12.5 mg Oral Q6H PRN    Or    ondansetron (ZOFRAN) injection 4 mg  4 mg IntraVENous Q6H PRN    aspirin chewable tablet 81 mg  81 mg Oral DAILY    atorvastatin (LIPITOR) tablet 40 mg  40 mg Oral QHS    carvediloL (COREG) tablet 25 mg  25 mg Oral BID WITH MEALS    isosorbide mononitrate ER (IMDUR) tablet 60 mg  60 mg Oral DAILY    hydrALAZINE (APRESOLINE) tablet 50 mg  50 mg Oral TID    pantoprazole (PROTONIX) tablet 40 mg  40 mg Oral ACB    albuterol-ipratropium (DUO-NEB) 2.5 MG-0.5 MG/3 ML  3 mL Nebulization Q6H PRN    glucose chewable tablet 16 g  4 Tablet Oral PRN    dextrose (D50W) injection syrg 12.5-25 g  25-50 mL IntraVENous PRN    glucagon (GLUCAGEN) injection 1 mg  1 mg IntraMUSCular PRN    insulin lispro (HUMALOG) injection   SubCUTAneous AC&HS        LABS:  Recent Labs     05/22/21  0309 05/21/21  0254   WBC 4.9 5.0   HGB 9.9* 9.3*   HCT 33.4* 30.3*   * 106*     Recent Labs     05/22/21  0309 05/21/21  0254 05/20/21  0335   NA 140 142 142   K 4.1 3.3* 3.2*    110* 108   CO2 30 27 29   BUN 36* 38* 37*   CREA 1.80* 1.90* 2.03*   * 132* 135*   CA 8.7 8.5 8.6   MG 2.3  --  1.8   PHOS  --   --  2.8     Recent Labs     05/21/21  0254 05/20/21  0335 05/19/21  1005   ALT 21 21 27   * 139* 185*   TBILI 0.6 0.8 1.2*   TP 5.9* 6.1* 7.0   ALB 2.4* 2.6* 3.1*   GLOB 3.5 3.5 3.9     Recent Labs     05/22/21  0309 05/21/21  1842 05/21/21  0952   APTT 70.8* 54.1* 29.4      No results for input(s): FE, TIBC, PSAT, FERR in the last 72 hours. No results for input(s): PH, PCO2, PO2 in the last 72 hours.   Recent Labs     05/19/21  1705   TROIQ 0.20*     Lab Results   Component Value Date/Time    Glucose (POC) 147 (H) 05/22/2021 07:01 AM    Glucose (POC) 160 (H) 05/21/2021 08:56 PM    Glucose (POC) 177 (H) 05/21/2021 04:22 PM    Glucose (POC) 194 (H) 05/21/2021 11:41 AM    Glucose (POC) 146 (H) 05/21/2021 06:53 AM

## 2021-05-23 LAB
ANION GAP SERPL CALC-SCNC: 5 MMOL/L (ref 5–15)
APTT PPP: 57.2 SEC (ref 22.1–31)
APTT PPP: 62.4 SEC (ref 22.1–31)
APTT PPP: 68.3 SEC (ref 22.1–31)
BUN SERPL-MCNC: 32 MG/DL (ref 6–20)
BUN/CREAT SERPL: 21 (ref 12–20)
CALCIUM SERPL-MCNC: 8.5 MG/DL (ref 8.5–10.1)
CHLORIDE SERPL-SCNC: 109 MMOL/L (ref 97–108)
CO2 SERPL-SCNC: 28 MMOL/L (ref 21–32)
CREAT SERPL-MCNC: 1.52 MG/DL (ref 0.55–1.02)
GLUCOSE BLD STRIP.AUTO-MCNC: 127 MG/DL (ref 65–117)
GLUCOSE BLD STRIP.AUTO-MCNC: 153 MG/DL (ref 65–117)
GLUCOSE BLD STRIP.AUTO-MCNC: 156 MG/DL (ref 65–117)
GLUCOSE BLD STRIP.AUTO-MCNC: 213 MG/DL (ref 65–117)
GLUCOSE SERPL-MCNC: 145 MG/DL (ref 65–100)
PHOSPHATE SERPL-MCNC: 4.6 MG/DL (ref 2.6–4.7)
POTASSIUM SERPL-SCNC: 3.8 MMOL/L (ref 3.5–5.1)
SERVICE CMNT-IMP: ABNORMAL
SODIUM SERPL-SCNC: 142 MMOL/L (ref 136–145)
THERAPEUTIC RANGE,PTTT: ABNORMAL SECS (ref 58–77)

## 2021-05-23 PROCEDURE — 74011250637 HC RX REV CODE- 250/637: Performed by: STUDENT IN AN ORGANIZED HEALTH CARE EDUCATION/TRAINING PROGRAM

## 2021-05-23 PROCEDURE — 36415 COLL VENOUS BLD VENIPUNCTURE: CPT

## 2021-05-23 PROCEDURE — 94760 N-INVAS EAR/PLS OXIMETRY 1: CPT

## 2021-05-23 PROCEDURE — 74011000258 HC RX REV CODE- 258: Performed by: NURSE PRACTITIONER

## 2021-05-23 PROCEDURE — 74011250637 HC RX REV CODE- 250/637: Performed by: NURSE PRACTITIONER

## 2021-05-23 PROCEDURE — 85730 THROMBOPLASTIN TIME PARTIAL: CPT

## 2021-05-23 PROCEDURE — 74011250636 HC RX REV CODE- 250/636: Performed by: NURSE PRACTITIONER

## 2021-05-23 PROCEDURE — 65660000000 HC RM CCU STEPDOWN

## 2021-05-23 PROCEDURE — 80048 BASIC METABOLIC PNL TOTAL CA: CPT

## 2021-05-23 PROCEDURE — 74011636637 HC RX REV CODE- 636/637: Performed by: STUDENT IN AN ORGANIZED HEALTH CARE EDUCATION/TRAINING PROGRAM

## 2021-05-23 PROCEDURE — 82962 GLUCOSE BLOOD TEST: CPT

## 2021-05-23 PROCEDURE — 84100 ASSAY OF PHOSPHORUS: CPT

## 2021-05-23 RX ORDER — INSULIN GLARGINE 100 [IU]/ML
5 INJECTION, SOLUTION SUBCUTANEOUS DAILY
Status: DISCONTINUED | OUTPATIENT
Start: 2021-05-23 | End: 2021-05-23

## 2021-05-23 RX ADMIN — INSULIN LISPRO 3 UNITS: 100 INJECTION, SOLUTION INTRAVENOUS; SUBCUTANEOUS at 11:24

## 2021-05-23 RX ADMIN — Medication 10 ML: at 21:14

## 2021-05-23 RX ADMIN — INSULIN LISPRO 2 UNITS: 100 INJECTION, SOLUTION INTRAVENOUS; SUBCUTANEOUS at 08:16

## 2021-05-23 RX ADMIN — CARVEDILOL 25 MG: 12.5 TABLET, FILM COATED ORAL at 16:38

## 2021-05-23 RX ADMIN — HYDRALAZINE HYDROCHLORIDE 50 MG: 50 TABLET, FILM COATED ORAL at 21:13

## 2021-05-23 RX ADMIN — ACETAMINOPHEN 650 MG: 325 TABLET ORAL at 17:46

## 2021-05-23 RX ADMIN — PREGABALIN 300 MG: 150 CAPSULE ORAL at 08:17

## 2021-05-23 RX ADMIN — Medication 10 ML: at 05:35

## 2021-05-23 RX ADMIN — HYDRALAZINE HYDROCHLORIDE 50 MG: 50 TABLET, FILM COATED ORAL at 16:38

## 2021-05-23 RX ADMIN — CLONAZEPAM 0.5 MG: 0.5 TABLET ORAL at 21:13

## 2021-05-23 RX ADMIN — PANTOPRAZOLE SODIUM 40 MG: 40 TABLET, DELAYED RELEASE ORAL at 08:17

## 2021-05-23 RX ADMIN — ATORVASTATIN CALCIUM 40 MG: 40 TABLET, FILM COATED ORAL at 21:13

## 2021-05-23 RX ADMIN — ISOSORBIDE MONONITRATE 60 MG: 30 TABLET, EXTENDED RELEASE ORAL at 08:17

## 2021-05-23 RX ADMIN — Medication 10 ML: at 14:37

## 2021-05-23 RX ADMIN — HYDRALAZINE HYDROCHLORIDE 50 MG: 50 TABLET, FILM COATED ORAL at 08:17

## 2021-05-23 RX ADMIN — ASPIRIN 81 MG: 81 TABLET, CHEWABLE ORAL at 08:17

## 2021-05-23 RX ADMIN — CEFTRIAXONE 1 G: 1 INJECTION, POWDER, FOR SOLUTION INTRAMUSCULAR; INTRAVENOUS at 17:46

## 2021-05-23 RX ADMIN — HEPARIN SODIUM 18.1 UNITS/KG/HR: 10000 INJECTION, SOLUTION INTRAVENOUS at 12:29

## 2021-05-23 RX ADMIN — PREGABALIN 300 MG: 150 CAPSULE ORAL at 17:46

## 2021-05-23 RX ADMIN — CARVEDILOL 25 MG: 12.5 TABLET, FILM COATED ORAL at 08:17

## 2021-05-23 NOTE — PROGRESS NOTES
Problem: Patient Education: Go to Patient Education Activity  Goal: Patient/Family Education  Outcome: Progressing Towards Goal     Problem: Pressure Injury - Risk of  Goal: *Prevention of pressure injury  Description: Document Preet Scale and appropriate interventions in the flowsheet.   Outcome: Progressing Towards Goal  Note: Pressure Injury Interventions:  Sensory Interventions: Assess changes in LOC    Moisture Interventions: Assess need for specialty bed, Absorbent underpads    Activity Interventions: Pressure redistribution bed/mattress(bed type), PT/OT evaluation, Increase time out of bed, Chair cushion    Mobility Interventions: Float heels, HOB 30 degrees or less, Pressure redistribution bed/mattress (bed type), PT/OT evaluation, Chair cushion    Nutrition Interventions: Document food/fluid/supplement intake, Discuss nutritional consult with provider    Friction and Shear Interventions: Foam dressings/transparent film/skin sealants, Feet elevated on foot rest, HOB 30 degrees or less, Lift sheet, Lift team/patient mobility team

## 2021-05-23 NOTE — PROGRESS NOTES
End of Shift Note    Bedside shift change report given to Kaya Rhodes RN (oncoming nurse) by Shilo Chavez (offgoing nurse). Report included the following information SBAR, Kardex, Intake/Output, MAR and Recent Results    Shift worked:  7am-7pm     Shift summary and any significant changes:     Pt rested. PTT drawn at 11 was therapeutic and resulted in no rate change on Heparin drip. Pt ambulated around room and sat up in chair today. Attempted to place second IV near end of shift to run antibiotics through but was not successful after 2 attempts. No other significant changes. Concerns for physician to address:  None. Zone phone for oncoming shift:   [phone]       Activity:  Activity Level: Up with Assistance  Number times ambulated in hallways past shift: 0  Number of times OOB to chair past shift: 2    Cardiac:   Cardiac Monitoring: Yes      Cardiac Rhythm: Ventricular Paced    Access:   Current line(s): PIV     Genitourinary:   Urinary status: voiding    Respiratory:   O2 Device: None (Room air)  Chronic home O2 use?: NO  Incentive spirometer at bedside: YES     GI:  Last Bowel Movement Date: 05/21/21  Current diet:  DIET DIABETIC CONSISTENT CARB Regular  Passing flatus: YES  Tolerating current diet: YES       Pain Management:   Patient states pain is manageable on current regimen: YES    Skin:  Preet Score: 18  Interventions: turn team, float heels, increase time out of bed, PT/OT consult and nutritional support     Patient Safety:  Fall Score:  Total Score: 4  Interventions: bed/chair alarm, assistive device (walker, cane, etc), gripper socks, pt to call before getting OOB and stay with me (per policy)  High Fall Risk: Yes    Length of Stay:  Expected LOS: 4d 19h  Actual LOS: [address]

## 2021-05-23 NOTE — PROGRESS NOTES
Hospitalist Progress Note    NAME: Odilia Gupta   :  1950   MRN:  [id number]     I reviewed pertinent labs/imaging and discussed plan of care with Attending Physician who is in agreement. Assessment / Plan:  Acute metabolic encephalopathy, improved   Polypharmacy  UTI  Bacteremia   CT head 21:  No acute process or change compared to the prior exam.  CT abdomen/pelvis 21:  Bilateral nonobstructing renal stones. No ureteral stone or hydronephrosis. No acute abnormality. UA 21:  Turbid, large blood, large LE, negative nitrite, 4+ bact. Urine culture 21:  >100,000 Col/mL E-Coli. Blood culture 21:  E-Coli in 4/4 bottles. Blood culture 21:  NG at 3 days.    - Encephalopathy improved today, patient slightly drowsy. - Patient has been taking pregabalin at home PTA but according to her  has been overtaking this medication (double or triple dose). She is also on clonazepam and recently received a prescription for tramadol and lorazepam.  - [facility] [place]:     - Pregabalin 300 mg #180 for 90 day supply, last filled 21.     - Clonazepam 0.5 mg #30 for 30 day supply, last filled 21.     - Tramadol 50 mg #28 for 7 day supply, filled 21.     - Lorazepam 1 mg #2 for 1 day supply, filled 21.  - Continue ceftriaxone 1 gm IV daily. CAD  HTN  Dyslipidemia   Cardiomyopathy   Chronic systolic heart failure  PAF  Mild troponin elevation   - No chest pain. - Continue asa 81 mg po daily. - Continue carvedilol 25 mg po bid. - Continue hydralazine 50 mg po tid. - Continue isosorbide mononitrate ER 60 mg po daily. - Continue atorvastatin 40 mg po daily. - Continue to hold dabigatran for now 2/2 renal function. -  requested to check to see if patient's insurance will cover apixaban. - Heparin gtt started 21, continue for now.   - Continue to hold diuretic for now 2/2 renal function, patient does not have significant edema nor does she have any crackles or SOB. CKD stage III, historical baseline Cr 1.3-1.6   Diabetic nephropathy   - Nephrology input appreciated. - Continue to avoid nephrotoxins.  - Adjust meds based on CrCl. - Monitor. Valentino non-obstructing renal calculi    - No tx indicated at this time. Anxiety  - Continue clonazepam 0.5 mg po daily at hs to avoid BZO withdrawal.    Anemia of chronic disease   Thrombocytopenia  - H/H stable and at historical baseline since initial drop from 11.5 which likely represented hemoconcentration.   - No obvious sign of active hemorrhage.    - Thrombocytopenia likely related to infection.   - No tx indicated at this time. - Continue to monitor. DM II  Hyperglycemia   Diabetic neuropathy  - IP diabetes team input appreciated. - Continue FSBS with SSI correction scale. - Patient reports improvement in neuropathic pain. - Continue pregabalin 300 mg po bid. SHAYLA  - Continue CPAP at hs. GERD  - Continue pantoprazole 40 mg po daily. Mild splenomegaly on CT  - OP follow up. Hypokalemia   - Supplement prn.  - Monitor. 30.0 - 39.9 Obese / Body mass index is 39.07 kg/m². Estimated discharge date:  TBD  Barriers:  None    Code status: Full  Prophylaxis: Lovenox  Recommended Disposition: Home w/Family     Subjective:     Chief Complaint / Reason for Physician Visit  Reports improvement in LE neuropathic pain. Plan of care and pertinent events reviewed with bedside nurse. Review of Systems:  Symptom Y/N Comments  Symptom Y/N Comments   Fever/Chills N   Chest Pain N    Poor Appetite N   Edema Y    Cough N   Abdominal Pain N    Sputum N   Joint Pain N    SOB/STEEN N   Pruritis/Rash N    Nausea/vomit N   Tolerating PT/OT     Diarrhea N   Tolerating Diet Y    Constipation N   Other Y Neuropathy     Could NOT obtain due to:      Objective:     VITALS:   Last 24hrs VS reviewed since prior progress note.  Most recent are:  Patient Vitals for the past 24 hrs:   Temp Pulse Resp BP SpO2   05/23/21 1030 98.1 °F (36.7 °C) 80 18 (!) 142/75 97 %   05/23/21 0804 97.8 °F (36.6 °C) 80 18 (!) 194/93 95 %   05/23/21 0400 98 °F (36.7 °C) 80 16 120/70 97 %   05/22/21 2230 98 °F (36.7 °C) 80 17 130/70 98 %   05/22/21 1957 98 °F (36.7 °C) 85 17 122/73 96 %   05/22/21 1629 97.6 °F (36.4 °C) 85 16 111/71 93 %   05/22/21 1600 -- 85 -- -- --       Intake/Output Summary (Last 24 hours) at 5/23/2021 1238  Last data filed at 5/23/2021 0341  Gross per 24 hour   Intake 450 ml   Output 1350 ml   Net -900 ml        I had a face to face encounter and independently examined this patient on 5/23/2021, as outlined below:  PHYSICAL EXAM:  General:  A/A/O X 3. NAD. HEENT:  Normocephalic. Sclera anicteric. EOMI. Mucous membranes moist.    Chest:  Resps even/unlabored with symmetrical CWE. Air entry diminished at bases. Lungs CTA. No use of accessory muscles. CV:  RRR. Paced. Normal S1/S2. Heart tones difficult to assess 2/2 body habitus. No JVD. Trace pretibial edema tello. GI:  Abdomen obese/NT. No organomegaly. No hernia. ABT X 4.    :  Voiding. Neurologic:  Nonfocal.  CN II-XII grossly intact. Face symmetrical.  Speech normal.     Psych:  Cooperative. No anxiety or agitation. No suicidal/homicidal ideation. Skin:  No jaundice. Chronic edema skin color changes noted on tello LEs. Reviewed most current lab test results and cultures  YES  Reviewed most current radiology test results   YES  Review and summation of old records today    NO  Reviewed patient's current orders and MAR    YES  PMH/SH reviewed - no change compared to H&P  ________________________________________________________________________  Care Plan discussed with:    Comments   Patient [address]     Consultant                        Multidiciplinary team rounds were held today with , nursing, pharmacist and clinical coordinator.   Patient's plan of care was discussed; medications were reviewed and discharge planning was addressed. ________________________________________________________________________  Melida Palomares NP     Procedures: see electronic medical records for all procedures/Xrays and details which were not copied into this note but were reviewed prior to creation of Plan. LABS:  I reviewed today's most current labs and imaging studies.   Pertinent labs include:  Recent Labs     05/22/21  0309 05/21/21  0254   WBC 4.9 5.0   HGB 9.9* 9.3*   HCT 33.4* 30.3*   * 106*     Recent Labs     05/23/21  0424 05/22/21  0309 05/21/21  0254    140 142   K 3.8 4.1 3.3*   * 107 110*   CO2 28 30 27   * 160* 132*   BUN 32* 36* 38*   CREA 1.52* 1.80* 1.90*   CA 8.5 8.7 8.5   MG  --  2.3  --    PHOS 4.6  --   --    ALB  --   --  2.4*   TBILI  --   --  0.6   ALT  --   --  21       Signed: Melida Palomares NP

## 2021-05-23 NOTE — PROGRESS NOTES
[facility]  Progress Note        NAME: Benjamin Martínez       :  1950       MRN:  [id number]     Date/Time: 2021    Risk of deterioration: medium       Assessment:    Plan:  GNR Bacteremia  UTI  AYLIN/CKD 3-due to above/other comorbidities - follows with Dr. Linnie Homans  Hypokalemia  HTN  CMO/EF 35%, pulm htn/tr  DM  OBesity  (B) non obstructing stones Creatinine slowly better. On Rocephin    Afebrile now, wbc improving    Phos better. Stop neutraphos    CT:  Bilateral nonobstructing renal stones. No ureteral stone or hydronephrosis. No  acute abnormality. Subjective:     Chief Complaint: \"I feel OK. \"  Less dyspnea. No N/V. No CP. Objective:     VITALS:   Last 24hrs VS reviewed since prior progress note.  Most recent are:  Visit Vitals  BP (!) 194/93   Pulse 80   Temp 97.8 °F (36.6 °C)   Resp 18   Ht 5' 9\" (1.753 m)   Wt 120 kg (264 lb 8.8 oz)   SpO2 95%   BMI 39.07 kg/m²     SpO2 Readings from Last 6 Encounters:   21 95%   21 96%   21 97%   21 96%   21 96%   21 96%    O2 Flow Rate (L/min): 3 l/min       Intake/Output Summary (Last 24 hours) at 2021 0857  Last data filed at 2021 0341  Gross per 24 hour   Intake 690 ml   Output 1350 ml   Net -660 ml        Telemetry Reviewed     PHYSICAL EXAM:    General   well developed, obese,  appears stated age, in no acute distress  EENT  Normocephalic, Atraumatic, EOMI  Respiratory   Clear To Auscultation bilaterally   Cardiology  Regular Rate and Rythmn    Abdominal  Soft, non-tender, non-distended  Extremities  No clubbing, cyanosis, tr edema             Lab Data Reviewed: (see below)    Medications Reviewed: (see below)    PMH/SH reviewed - no change compared to H&P    ___________________________________________________    Attending Physician: Floyd Diaz, MD     ____________________________________________________  MEDICATIONS:  Current Facility-Administered Medications   Medication Dose Route Frequency    clonazePAM (KlonoPIN) tablet 0.5 mg  0.5 mg Oral QHS    pregabalin (LYRICA) capsule 300 mg  300 mg Oral BID    cefTRIAXone (ROCEPHIN) 1 g in 0.9% sodium chloride (MBP/ADV) 50 mL MBP  1 g IntraVENous Q24H    heparin 25,000 units in D5W 250 ml infusion  8.1-25 Units/kg/hr IntraVENous TITRATE    heparin (porcine) injection 2,000 Units  2,000 Units IntraVENous PRN    Or    heparin (porcine) injection 4,000 Units  4,000 Units IntraVENous PRN    sodium chloride (NS) flush 5-40 mL  5-40 mL IntraVENous Q8H    sodium chloride (NS) flush 5-40 mL  5-40 mL IntraVENous PRN    acetaminophen (TYLENOL) tablet 650 mg  650 mg Oral Q6H PRN    Or    acetaminophen (TYLENOL) suppository 650 mg  650 mg Rectal Q6H PRN    polyethylene glycol (MIRALAX) packet 17 g  17 g Oral DAILY PRN    promethazine (PHENERGAN) tablet 12.5 mg  12.5 mg Oral Q6H PRN    Or    ondansetron (ZOFRAN) injection 4 mg  4 mg IntraVENous Q6H PRN    aspirin chewable tablet 81 mg  81 mg Oral DAILY    atorvastatin (LIPITOR) tablet 40 mg  40 mg Oral QHS    carvediloL (COREG) tablet 25 mg  25 mg Oral BID WITH MEALS    isosorbide mononitrate ER (IMDUR) tablet 60 mg  60 mg Oral DAILY    hydrALAZINE (APRESOLINE) tablet 50 mg  50 mg Oral TID    pantoprazole (PROTONIX) tablet 40 mg  40 mg Oral ACB    albuterol-ipratropium (DUO-NEB) 2.5 MG-0.5 MG/3 ML  3 mL Nebulization Q6H PRN    glucose chewable tablet 16 g  4 Tablet Oral PRN    dextrose (D50W) injection syrg 12.5-25 g  25-50 mL IntraVENous PRN    glucagon (GLUCAGEN) injection 1 mg  1 mg IntraMUSCular PRN    insulin lispro (HUMALOG) injection   SubCUTAneous AC&HS        LABS:  Recent Labs     05/22/21  0309 05/21/21  0254   WBC 4.9 5.0   HGB 9.9* 9.3*   HCT 33.4* 30.3*   * 106*     Recent Labs     05/23/21  0424 05/22/21  0309 05/21/21  0254    140 142   K 3.8 4.1 3.3*   * 107 110*   CO2 28 30 27   BUN 32* 36* 38*   CREA 1.52* 1.80* 1.90*   * 160* 132*   CA 8.5 8.7 8.5   MG  --  2.3  --    PHOS 4.6  --   --      Recent Labs     05/21/21  0254   ALT 21   *   TBILI 0.6   TP 5.9*   ALB 2.4*   GLOB 3.5     Recent Labs     05/23/21  0424 05/22/21  1141 05/22/21  0309   APTT 57.2* 62.7* 70.8*      No results for input(s): FE, TIBC, PSAT, FERR in the last 72 hours. No results for input(s): PH, PCO2, PO2 in the last 72 hours. No results for input(s): CPK, CKNDX, TROIQ in the last 72 hours.     No lab exists for component: CPKMB  Lab Results   Component Value Date/Time    Glucose (POC) 156 (H) 05/23/2021 06:27 AM    Glucose (POC) 146 (H) 05/22/2021 09:01 PM    Glucose (POC) 166 (H) 05/22/2021 04:24 PM    Glucose (POC) 165 (H) 05/22/2021 10:49 AM    Glucose (POC) 147 (H) 05/22/2021 07:01 AM

## 2021-05-24 ENCOUNTER — TELEPHONE (OUTPATIENT)
Dept: INTERNAL MEDICINE CLINIC | Age: 71
End: 2021-05-24

## 2021-05-24 VITALS
RESPIRATION RATE: 18 BRPM | DIASTOLIC BLOOD PRESSURE: 94 MMHG | SYSTOLIC BLOOD PRESSURE: 177 MMHG | HEIGHT: 69 IN | BODY MASS INDEX: 39.18 KG/M2 | TEMPERATURE: 98 F | HEART RATE: 80 BPM | OXYGEN SATURATION: 93 % | WEIGHT: 264.55 LBS

## 2021-05-24 LAB
ANION GAP SERPL CALC-SCNC: 6 MMOL/L (ref 5–15)
APTT PPP: 84.3 SEC (ref 22.1–31)
BUN SERPL-MCNC: 27 MG/DL (ref 6–20)
BUN/CREAT SERPL: 18 (ref 12–20)
CALCIUM SERPL-MCNC: 8.8 MG/DL (ref 8.5–10.1)
CHLORIDE SERPL-SCNC: 108 MMOL/L (ref 97–108)
CO2 SERPL-SCNC: 29 MMOL/L (ref 21–32)
CREAT SERPL-MCNC: 1.47 MG/DL (ref 0.55–1.02)
GLUCOSE BLD STRIP.AUTO-MCNC: 167 MG/DL (ref 65–117)
GLUCOSE SERPL-MCNC: 164 MG/DL (ref 65–100)
PHOSPHATE SERPL-MCNC: 4.1 MG/DL (ref 2.6–4.7)
POTASSIUM SERPL-SCNC: 3.8 MMOL/L (ref 3.5–5.1)
SERVICE CMNT-IMP: ABNORMAL
SODIUM SERPL-SCNC: 143 MMOL/L (ref 136–145)
THERAPEUTIC RANGE,PTTT: ABNORMAL SECS (ref 58–77)
TROPONIN I SERPL-MCNC: <0.05 NG/ML

## 2021-05-24 PROCEDURE — 94760 N-INVAS EAR/PLS OXIMETRY 1: CPT

## 2021-05-24 PROCEDURE — 84100 ASSAY OF PHOSPHORUS: CPT

## 2021-05-24 PROCEDURE — 74011250637 HC RX REV CODE- 250/637: Performed by: NURSE PRACTITIONER

## 2021-05-24 PROCEDURE — 85730 THROMBOPLASTIN TIME PARTIAL: CPT

## 2021-05-24 PROCEDURE — 84484 ASSAY OF TROPONIN QUANT: CPT

## 2021-05-24 PROCEDURE — 80048 BASIC METABOLIC PNL TOTAL CA: CPT

## 2021-05-24 PROCEDURE — 74011250637 HC RX REV CODE- 250/637: Performed by: STUDENT IN AN ORGANIZED HEALTH CARE EDUCATION/TRAINING PROGRAM

## 2021-05-24 PROCEDURE — 82962 GLUCOSE BLOOD TEST: CPT

## 2021-05-24 PROCEDURE — 74011250636 HC RX REV CODE- 250/636: Performed by: NURSE PRACTITIONER

## 2021-05-24 PROCEDURE — 36415 COLL VENOUS BLD VENIPUNCTURE: CPT

## 2021-05-24 RX ORDER — HYDRALAZINE HYDROCHLORIDE 50 MG/1
50 TABLET, FILM COATED ORAL 3 TIMES DAILY
Qty: 90 TABLET | Refills: 0 | Status: SHIPPED | OUTPATIENT
Start: 2021-05-24 | End: 2021-05-24

## 2021-05-24 RX ORDER — CEFDINIR 300 MG/1
300 CAPSULE ORAL 2 TIMES DAILY
Qty: 18 CAPSULE | Refills: 0 | Status: SHIPPED | OUTPATIENT
Start: 2021-05-24 | End: 2021-06-01 | Stop reason: ALTCHOICE

## 2021-05-24 RX ORDER — PREGABALIN 300 MG/1
300 CAPSULE ORAL 2 TIMES DAILY
Qty: 1 CAPSULE | Refills: 0 | Status: SHIPPED
Start: 2021-05-24 | End: 2022-07-22

## 2021-05-24 RX ADMIN — PREGABALIN 300 MG: 150 CAPSULE ORAL at 09:06

## 2021-05-24 RX ADMIN — CARVEDILOL 25 MG: 12.5 TABLET, FILM COATED ORAL at 09:05

## 2021-05-24 RX ADMIN — ASPIRIN 81 MG: 81 TABLET, CHEWABLE ORAL at 09:06

## 2021-05-24 RX ADMIN — Medication 10 ML: at 05:17

## 2021-05-24 RX ADMIN — HYDRALAZINE HYDROCHLORIDE 50 MG: 50 TABLET, FILM COATED ORAL at 09:05

## 2021-05-24 RX ADMIN — HEPARIN SODIUM 18.1 UNITS/KG/HR: 10000 INJECTION, SOLUTION INTRAVENOUS at 00:11

## 2021-05-24 RX ADMIN — ISOSORBIDE MONONITRATE 60 MG: 30 TABLET, EXTENDED RELEASE ORAL at 09:05

## 2021-05-24 RX ADMIN — PANTOPRAZOLE SODIUM 40 MG: 40 TABLET, DELAYED RELEASE ORAL at 09:08

## 2021-05-24 NOTE — PROGRESS NOTES
Transition of Care Plan:     RUR:   37%  Disposition: Home w/ [address] added to AVS.     1 PM Pt  called and RN forgot to give him scripts. RN lead is going to call in scripts to pharmacy [address]: Phone [phone]. CM called family to let them know we are calling them in now. Follow up appointments: PCP: CM emailed CM Specialist to make PCP: Ruel Carlos: 6/1/21at 10:30 AM  Urology: Dr. Nuha Muniz 5/25/21 at 2 PM  Nephrology: Dr. Tequila Best: 7/19/21 at 9 AM.  Office will call Pt directly after checking with MD to see if she needs to be seen sooner. DME needed: None identified    Transportation at Discharge:  - private vehicle    Keys or means to access home:     Per spouse   IM Medicare letter:  delivered today. Medicare pt has received, reviewed, and signed 2nd IM letter informing them of their right to appeal the discharge. Signed copy has been placed on pt bedside chart. Caregiver Contact:  - Ary Elder - [phone]    Discharge Caregiver contacted prior to discharge? CM spoke to patient and her spouse - Ary Elder  And they are in agreement with DC plan. No further CM needs. Care Management Interventions  PCP Verified by CM: Yes  Palliative Care Criteria Met (RRAT>21 & CHF Dx)?: No  Mode of Transport at Discharge:  Other (see comment)  Transition of Care Consult (CM Consult): Discharge Planning, [address]: No  Reason Outside [place]: Patient already serviced by other home care/hospice agency  MyChart Signup: No  Discharge Durable Medical Equipment: No  Physical Therapy Consult: No  Occupational Therapy Consult: No  Current Support Network: Lives with Spouse, Own Home  Confirm Follow Up Transport: Family  The Plan for Transition of Care is Related to the Following Treatment Goals : Home with Home Health: [facility]  The Patient and/or Patient Representative was Provided with a Choice of Provider and Agrees with the Discharge Plan?: No  Name of the Patient Representative Who was Provided with a Choice of Provider and Agrees with the Discharge Plan: Pt and Spouse  Freedom of Choice List was Provided with Basic Dialogue that Supports the Patient's Individualized Plan of Care/Goals, Treatment Preferences and Shares the Quality Data Associated with the Providers?: No  Discharge Location  Discharge Placement: Home with home health    Richi Cisse

## 2021-05-24 NOTE — DISCHARGE SUMMARY
Hospitalist Discharge Summary     Patient ID:  Mat Sanchez  [id number]  41 y.o.  1950 5/19/2021    PCP on record: Casimiro Wilson MD    Admit date: 5/19/2021  Discharge date and time: 5/24/2021    DISCHARGE DIAGNOSIS:  Acute metabolic encephalopathy, improved   Polypharmacy  UTI  Bacteremia   CAD  HTN  Dyslipidemia   Cardiomyopathy   Chronic systolic heart failure  PAF  Mild troponin elevation   CKD stage III, historical baseline Cr 1.3-1.6   Diabetic nephropathy   Valentino non-obstructing ureteral stones  Anxiety  Anemia of chronic disease   Thrombocytopenia  DM II  Hyperglycemia   Diabetic neuropathy  SHAYLA  GERD  Mild splenomegaly on CT  Hypokalemia     CONSULTATIONS:  IP CONSULT TO NEPHROLOGY    Excerpted HPI from H&P of Margo Issa MD:  Jade Suárez is a 78 y.o.  female with past medical history significant for anxiety/depression, chronic lower back pain, diabetes with diabetic nephropathy, ischemic heart disease s/p ICD placement, paroxysmal atrial fibrillation on anticoagulation with Pradaxa, hypertension, chronic kidney disease. History was mainly elected from her  as she was very lethargic and was unable to provide any meaningful history. He described that she had been feeling lethargic since yesterday evening and had post concerns about her Lyrica dose increased recently as well as the addition of tramadol for pain control. He also had explained that she had loose bowel movements for the last 3 4 days, and had complained of burning micturition. Had felt a bit of a chill but no recorded fever.   No respiratory symptoms and no other complaints.       Earlier today she had slipped off her bed and fell on the floor no loss of consciousness.     In the emergency room her evaluation was significant for drowsiness/slow response, elevated blood pressure, a urinalysis with pyuria and bacteriuria, mildly elevated troponin level, creatinine of 1.89 which appears at her baseline, blood cell count of 10.5 no band forms were recorded, hemoglobin 11.5, platelets 447. \"    ______________________________________________________________________  DISCHARGE SUMMARY/HOSPITAL COURSE:  for full details see H&P, daily progress notes, labs, consult notes. Acute metabolic encephalopathy, improved   Polypharmacy  UTI  Bacteremia   CT head 05/19/21:  No acute process or change compared to the prior exam.  CT abdomen/pelvis 05/19/21:  Bilateral nonobstructing renal stones. No ureteral stone or hydronephrosis. No acute abnormality. UA 05/19/21:  Turbid, large blood, large LE, negative nitrite, 4+ bact. Urine culture 05/19/21:  >100,000 Col/mL E-Coli. Blood culture 05/19/21:  E-Coli in 4/4 bottles. Blood culture 05/20/21:  NG at 4 days.    - Encephalopathy resolved prior to hospital discharge. - Patient has been taking pregabalin at home PTA but according to her  has been overtaking this medication (double or triple dose). She is also on clonazepam and recently received a prescription for tramadol and lorazepam.  - [place]:     - Pregabalin 300 mg #180 for 90 day supply, last filled 03/11/21.     - Clonazepam 0.5 mg #30 for 30 day supply, last filled 04/30/21.     - Tramadol 50 mg #28 for 7 day supply, filled 04/29/21.     - Lorazepam 1 mg #2 for 1 day supply, filled 05/11/21.  - Patient received 5 day course of ceftriaxone while hospitalized. - D/C on additional 9 days of cefdinir 300 mg po bid. - D/C on probiotic.     CAD  HTN  Dyslipidemia   Cardiomyopathy   Chronic systolic heart failure  PAF  Mild troponin elevation   - No chest pain. - Repeat troponin < 0.05  - Continue asa 81 mg po daily. - Continue carvedilol 25 mg po bid. - Continue hydralazine, increase back to 75 mg po tid which was PTA dose. - Continue isosorbide mononitrate ER 60 mg po daily.    - Continue atorvastatin 40 mg po daily.  - Patient was treated with heparin gtt while hospitalized 2/2 AYLIN. - Resume dabigatran 150 mg po bid, starting evening on 05/24/21, CrCl >50 on day of d/c.   - Resume diuretic at d/c.        CKD stage III, historical baseline Cr 1.3-1.6   Diabetic nephropathy   - Followed by nephrology while hospitalized. - Continue to avoid nephrotoxins.  - Serum Cr 1.47 on day of d/c.  - Follow up with nephrology (Dr. Catalino Soto).      Valentino non-obstructing renal calculi  - No pain. - Follow up with [facility].        Anxiety  - Continue clonazepam 0.5 mg po daily at hs, chronic med.      Anemia of chronic disease   Thrombocytopenia  - H/H stable while hospitalized. - No tx required. - No obvious sign of active hemorrhage.    - Follow up with PCP.      DM II  Hyperglycemia   Diabetic neuropathy  - Continue pregabalin 300 mg po bid. - Follow up with PCP.     SHAYLA  - Continue CPAP at hs.       GERD  - Continue pantoprazole 40 mg po daily.     Mild splenomegaly on CT  - Will need OP follow up with PCP.       Hypokalemia   - Supplemented while hospitalized. - Resume K+ supplement with diuretic. All discharge instructions and discharge medications were reviewed with the patient and her  who verbalized understanding. At the time of this dictation the patient's vital signs were as follows:  T 98, /94 (prior to a.m. meds), HR 80, RR 18, SpO2 93% on room air.      _______________________________________________________________________  Patient seen and examined by me on discharge day. Pertinent Findings:  General:  A/A/O X 3. NAD. HEENT:  Normocephalic. Sclera anicteric. EOMI. Mucous membranes moist.    Chest:  Resps even/unlabored with symmetrical CWE. Air entry diminished at bases. Lungs CTA. No use of accessory muscles. CV:  RRR. Paced. Normal S1/S2. Heart tones difficult to assess 2/2 body habitus. No JVD. Trace pretibial edema. GI:  Abdomen soft/NT/obese. No organomegaly. No hernia. ABT X 4.    :  Voiding. Neurologic:  Nonfocal.  CN II-XII grossly intact. Face symmetrical.  Speech normal.     Psych:  Cooperative. No anxiety or agitation. No suicidal/homicidal ideation. Skin:  No rashes or jaundice. Chronic edema shin color changes noted on tello LEs.   _______________________________________________________________________  DISCHARGE MEDICATIONS:   Current Discharge Medication List      START taking these medications    Details   cefdinir (OMNICEF) 300 mg capsule Take 1 Capsule by mouth two (2) times a day. Qty: 18 Capsule, Refills: 0  Start date: 5/24/2021      L.acidoph & parac-S.therm-Bifido (JONNIE Q2/RISAQUAD-2) 16 billion cell cap cap Take 1 Capsule by mouth daily. Qty: 10 Capsule, Refills: 0  Start date: 5/24/2021         CONTINUE these medications which have CHANGED    Details   pregabalin (LYRICA) 300 mg capsule Take 1 Capsule by mouth two (2) times a day. Max Daily Amount: 600 mg. This is the same dose that you have been prescribed prior to hospital admission. No prescription was sent for this medication. Associated Diagnoses: Neuropathy         CONTINUE these medications which have NOT CHANGED    Details   hydrALAZINE (APRESOLINE) 25 mg tablet Take 75 mg by mouth three (3) times daily. Indications: high blood pressure      carvediloL (COREG) 25 mg tablet Take 25 mg by mouth two (2) times daily (with meals). pantoprazole (PROTONIX) 40 mg tablet Take 1 Tab by mouth daily. Qty: 30 Tab, Refills: 0      torsemide (DEMADEX) 20 mg tablet Take 1 Tab by mouth daily. Qty: 90 Tab, Refills: 2      ferrous sulfate 325 mg (65 mg iron) tablet Take 1 Tab by mouth every other day. Qty: 90 Tab, Refills: 2      mupirocin (BACTROBAN) 2 % ointment Apply  to affected area daily. Qty: 30 g, Refills: 2    Associated Diagnoses: Open wound of left lower leg, initial encounter      clonazePAM (KlonoPIN) 0.5 mg tablet TAKE 1 TABLET BY MOUTH NIGHTLY .  DO NOT EXCEED  0.5  MG  PER  DAY  Qty: 90 Tab, Refills: 0 Associated Diagnoses: Anxiety      eucalyptus-peppermint oil (Ponaris) soln 1-2 Drops by Nasal route nightly as needed. triamcinolone acetonide (KENALOG) 0.1 % topical cream APPLY A THIN FILM OF CREAM EXTERNALLY TO AFFECTED AREA TWICE DAILY  Qty: 15 g, Refills: 3      isosorbide mononitrate ER (IMDUR) 60 mg CR tablet Take 1 Tab by mouth daily. Qty: 90 Tab, Refills: 3      magnesium oxide (MAG-OX) 400 mg tablet Take 1 tablet by mouth twice daily  Qty: 180 Tab, Refills: 3      potassium chloride SR (KLOR-CON 10) 10 mEq tablet Take 1 Tab by mouth three (3) times daily. Qty: 540 Tab, Refills: 3      albuterol (PROVENTIL HFA, VENTOLIN HFA, PROAIR HFA) 90 mcg/actuation inhaler Take 1 Puff by inhalation every four (4) hours as needed for Wheezing. Qty: 1 Inhaler, Refills: 6      dabigatran etexilate (PRADAXA) 150 mg capsule Take 1 Cap by mouth two (2) times a day. IF NO BLEEDING AT CATH SITE. Qty: 60 Cap, Refills: 12      sodium chloride (AYR SALINE) 0.65 % nasal squeeze bottle 2 Sprays by Both Nostrils route every eight (8) hours as needed. conjugated estrogens (PREMARIN) 0.625 mg/gram vaginal cream Insert 0.5 g into vagina two (2) times a week. Using Daily      venlafaxine-SR (EFFEXOR XR) 150 mg capsule Take 150 mg by mouth two (2) times daily (with meals). cholecalciferol (Vitamin D3) 25 mcg (1,000 unit) cap Take 1,000 Units by mouth daily. aspirin 81 mg chewable tablet Take 81 mg by mouth daily. Associated Diagnoses: Unspecified hereditary and idiopathic peripheral neuropathy; Essential and other specified forms of tremor; Migraine with aura, without mention of intractable migraine without mention of status migrainosus; Type II or unspecified type diabetes mellitus with neurological manifestations, not stated as uncontrolled(250.60) ([facility] [address] B12 deficiency; Ataxia; Spinal stenosis, lumbar region, without neurogenic claudication; HBP (high blood pressure); Polyneuropathy in diabetes(357.2);  Type II or unspecified type diabetes mellitus with neurological manifestations, not stated as uncontrolled(250.60) ([address] Polyneuropathy in diabetes(357.2)      atorvastatin (LIPITOR) 40 mg tablet Take 1 Tab by mouth nightly. Qty: 30 Tab, Refills: 12      SYMBICORT 160-4.5 mcg/actuation HFAA INHALE 2 PUFFS BY MOUTH TWICE DAILY  Qty: 1 Inhaler, Refills: 3    Comments: Please consider 90 day supplies to promote better adherence  Associated Diagnoses: Cough         STOP taking these medications       cyclobenzaprine (FLEXERIL) 10 mg tablet Comments:   Reason for Stopping:                 Patient Follow Up Instructions: Activity: Activity as tolerated  Diet: Diabetic Diet  Wound Care: None needed    Follow-up as noted below  Follow-up tests/labs:  Per PCP    Follow-up Information     Follow up With Specialties Details Why Contact Info    Barbara Severino MD Internal Medicine Go on 6/1/2021 for PCP post hospital follow up appt 10:30 A. [address] [place] [address]  [phone]      [address] [place] [address]  this is your home health agency. [address] [facility], [address] [place] [address] Urology  Go on 5/25/2021 Dr. Humaira Flores at 2:00 P. M.  for follow up appointment to discuss treatment for kidney stones [address] [place] [address] [address]    Corazon Del Cid MD Nephrology Go on 7/19/2021  at 9:00 A. M. for nephrology follow up appt. Office will check with MD and if she wants to see you sooner they will call you directly to schedule new appt.   [address]  [address]  [phone]          ________________________________________________________________    Risk of deterioration: High    Condition at Discharge:  Stable  __________________________________________________________________    Disposition  Home with family and home health services    ____________________________________________________________________    Code Status: Full Code  ___________________________________________________________________      Total time in minutes spent coordinating this discharge (includes going over instructions, follow-up, prescriptions, and preparing report for sign off to her PCP) :  >30 minutes    Signed:  Kiah Diallo NP

## 2021-05-24 NOTE — DISCHARGE INSTRUCTIONS
Urinary Tract Infection (UTI) in Women: Care Instructions  Overview     A urinary tract infection, or UTI, is a general term for an infection anywhere between the kidneys and the urethra (where urine comes out). Most UTIs are bladder infections. They often cause pain or burning when you urinate. UTIs are caused by bacteria and can be cured with antibiotics. Be sure to complete your treatment so that the infection does not get worse. Follow-up care is a key part of your treatment and safety. Be sure to make and go to all appointments, and call your doctor if you are having problems. It's also a good idea to know your test results and keep a list of the medicines you take. How can you care for yourself at home? · Take your antibiotics as directed. Do not stop taking them just because you feel better. You need to take the full course of antibiotics. · Drink extra water and other fluids for the next day or two. This will help make the urine less concentrated and help wash out the bacteria that are causing the infection. (If you have kidney, heart, or liver disease and have to limit fluids, talk with your doctor before you increase the amount of fluids you drink.)  · Avoid drinks that are carbonated or have caffeine. They can irritate the bladder. · Urinate often. Try to empty your bladder each time. · To relieve pain, take a hot bath or lay a heating pad set on low over your lower belly or genital area. Never go to sleep with a heating pad in place. To prevent UTIs  · Drink plenty of water each day. This helps you urinate often, which clears bacteria from your system. (If you have kidney, heart, or liver disease and have to limit fluids, talk with your doctor before you increase the amount of fluids you drink.)  · Urinate when you need to. · If you are sexually active, urinate right after you have sex. · Change sanitary pads often.   · Avoid douches, bubble baths, feminine hygiene sprays, and other feminine hygiene products that have deodorants. · After going to the bathroom, wipe from front to back. When should you call for help? Call your doctor now or seek immediate medical care if:    · Symptoms such as fever, chills, nausea, or vomiting get worse or appear for the first time.     · You have new pain in your back just below your rib cage. This is called flank pain.     · There is new blood or pus in your urine.     · You have any problems with your antibiotic medicine. Watch closely for changes in your health, and be sure to contact your doctor if:    · You are not getting better after taking an antibiotic for 2 days.     · Your symptoms go away but then come back. Where can you learn more? Go to http://www.gray.com/  Enter C174 in the search box to learn more about \"Urinary Tract Infection (UTI) in Women: Care Instructions. \"  Current as of: June 29, 2020               Content Version: 12.8  © 2006-2021 [employer]. Care instructions adapted under license by [employer] (which disclaims liability or warranty for this information). If you have questions about a medical condition or this instruction, always ask your healthcare professional. [place] [address] any warranty or liability for your use of this information. Sepsis: Care Instructions  Overview     Sepsis is an intense reaction to an infection. It can cause damage to the body and lead to dangerously low blood pressure. You may have inflammation across large areas of your body. It can damage tissue and even go deep into your organs. Infections that can lead to sepsis include:  · A skin infection such as from a cut. · A lung infection like pneumonia. · A kidney infection. · A gut infection such as E. coli. Sepsis is treated with antibiotics. Your doctor will try to find the infection that led to sepsis. [place] also get fluids through a vein (IV).  Machines will track your vital signs, including temperature, blood pressure, breathing rate, and pulse rate. The physical and mental effects of sepsis may not be seen for several weeks after treatment. And they may last long after the infection is gone. Physical problems may include:  · Feeling weak and tired. · Feeling out of breath. · Aches and pains. · Problems with getting around. · Trouble falling asleep or staying asleep. · Dry and itchy skin, brittle nails, and hair loss. Some of these effects can lead to problems with your organs or your feet, legs, hands, or arms. Sepsis can also affect your mind and emotions. Problems may include:  · Self-doubt. · Anxiety. · Nightmares. · Depression and mood problems. · Wanting to avoid other people. · Confusion. · Flashbacks and bad memories of your illness. It's important to care for yourself and try to avoid infections. This may lower your risk of getting sepsis again. Follow-up care is a key part of your treatment and safety. Be sure to make and go to all appointments, and call your doctor if you are having problems. It's also a good idea to know your test results and keep a list of the medicines you take. How can you care for yourself at home? · Be safe with medicines. Take your medicines exactly as prescribed. Call your doctor if you think you are having a problem with your medicine. · If your doctor prescribed antibiotics, take them as directed. Do not stop taking them just because you feel better. You need to take the full course of antibiotics. · Help prevent infections that could again lead to sepsis. ? Try to avoid colds and flu. If you must be around people who have a cold or the flu, wash your hands often. And get a flu vaccine every year. ? Ask your doctor if you need a pneumococcal vaccine (to prevent pneumonia, meningitis, and other infections). If you have had one before, ask your doctor if you need another dose. ? Clean any wounds or scrapes.   · Do not smoke or use other tobacco products. When you quit smoking, you are less likely to get a cold, the flu, bronchitis, and pneumonia. If you need help quitting, talk to your doctor about stop-smoking programs and medicines. These can increase your chances of quitting for good. · Drink plenty of fluids to prevent dehydration. Choose water and other caffeine-free clear liquids until you feel better. If you have kidney, heart, or liver disease and have to limit fluids, talk with your doctor before you increase the amount of fluids you drink. · Eat a healthy diet. Include fruits, vegetables, and whole grains in your diet every day. · If your doctor recommends it, try doing some physical activity. Walking is a good choice. Bit by bit, increase the amount you walk every day. · Talk with your family and friends about your challenges. Ask for help if you need it. · Keep a journal. Writing down your thoughts and feelings can help reduce your stress. · Ask family members to fill in gaps in your memory. · Set small goals for yourself that you can reach. Reward yourself for success. When should you call for help? Call 911 anytime you think you may need emergency care. For example, call if:    · You passed out (lost consciousness). Call your doctor now or seek immediate medical care if:    · You have symptoms such as:  ? Shortness of breath. ? Feeling very sick. ? Severe pain. ? A fast heart rate. ? Cool, pale, or clammy skin. ? Feeling confused. ? Feeling very sleepy, or you are hard to wake up.     · You are dizzy or lightheaded, or you feel like you may faint.     · You have a fever or chills. Watch closely for changes in your health, and be sure to contact your doctor if:    · You do not get better as expected. Where can you learn more? Go to http://www.gray.com/  Enter T383 in the search box to learn more about \"Sepsis: Care Instructions. \"  Current as of: September 23, 2020               Content Version: 12.8   [employer]. Care instructions adapted under license by [employer] (which disclaims liability or warranty for this information). If you have questions about a medical condition or this instruction, always ask your healthcare professional. [place] any warranty or liability for your use of this information. HOSPITALIST DISCHARGE INSTRUCTIONS    NAME: Shalini Dixon   :  1950   MRN:  [id number]     Date/Time:  2021 6:29 AM    ADMIT DATE: 2021   DISCHARGE DATE: 2021     Attending Physician: Michelle Barros NP    DISCHARGE DIAGNOSIS:  Acute metabolic encephalopathy (confusion caused by your infection)  Polypharmacy (use of multiple medications)  Urinary tract infection  Bacteremia (bacteria in your blood)  Coronary artery disease (abnormal blood flow to the heart muscle)  Hypertension (elevated blood pressure)  Dyslipidemia (abnormal blood lipids/fats)  Cardiomyopathy (weak heart muscle)  Chronic systolic heart failure (abnormal pumping of the heart muscle)  Paroxysmal atrial fibrillation (irregular heartbeat for which you are on blood thinners)  Chronic kidney disease (chronically abnormal kidney function)  Diabetic nephropathy (kidney damage caused by longstanding diabetes)  [place] non-obstructing renal stones (kidney stones, if you develop any abdominal or flank pain report to the emergency department.   You have been provided the name of a urologist so that you can have outpatient follow-up as needed)  Anxiety  Anemia of chronic disease (chronically low blood count)  Thrombocytopenia (low blood platelet count)  Type 2 diabetes  Diabetic neuropathy (nerve pain caused by longstanding diabetes)  Struct of sleep apnea (tendency to stop breathing while sleeping)  Gastroesophageal reflux disease (reflux of stomach contents into the esophagus)  Mild splenomegaly on CT (mildly enlarged spleen she will need patient follow-up (  Hypokalemia (blood potassium for which you had supplementation while hospitalized)      Medications: Per above medication reconciliation. Pain Management: per above medications    Recommended diet: Diabetic Diet    Recommended activity: Activity as tolerated    Wound care: None    Indwelling devices:  None    Supplemental Oxygen: None    Required Lab work: [address] PCP    Glucose management:  As you did prior to hospital admission    Code status: Full     Take all discharge paperwork with you to all of your follow up doctor appointments. Take your medications exactly as outlined in your discharge paperwork. Do not take any other medications unless specifically prescribed after your hospital stay. If your symptoms return/worsen seek medical attention immediately. You are being discharged on antibiotics. Antibiotics can cause diarrhea and bacterial infection in the GI tract. In order to reduce the risk of this you are being prescribed a probiotic. Should you develop a rash contact your Primary Care Provider immediately. If you develop swelling of the lips, mouth, or throat activate EMS or go to the closest Emergency Room. Outside physician follow up: Follow-up Information     Follow up With Specialties Details Why Contact Info    Nataliya Marti MD Internal Medicine Schedule an appointment as soon as possible for a visit in 1 week for PCP post hospital follow up appt. [address]      [address] [place] [address]  this is your home health agency. [address] [place] [address], [facility]  [phone]          Information obtained by :  I understand that if any problems occur once I am at home I am to contact my physician. I understand and acknowledge receipt of the instructions indicated above. Physician's or R.N.'s Signature                                                                  Date/Time                                                                                                                                              Patient or Representative

## 2021-05-24 NOTE — PROGRESS NOTES
Bedside and Verbal shift change report given to Zheng García 15 (oncoming nurse) by Urmila Martinez (offgoing nurse). Report included the following information SBAR, Kardex, ED Summary, Intake/Output, MAR and Recent Results.

## 2021-05-24 NOTE — TELEPHONE ENCOUNTER
[facility] states patient discharged from [facility] today and wants to know if you will follow and sign orders for her. Please call.      [phone] April

## 2021-05-24 NOTE — PROGRESS NOTES
[facility]  Progress Note        NAME: Ambreen Garcia       :  1950       MRN:  [id number]     Date/Time: 2021    Risk of deterioration: medium       Assessment:    Plan:  GNR Bacteremia  UTI  AYLIN/CKD 3-due to above/other comorbidities - follows with Dr. Randy Jauregui  Hypokalemia  HTN  CMO/EF 35%, pulm htn/tr  DM  OBesity  (B) non obstructing stones Creatinine slowly better. On Rocephin    Phos better. Stop neutraphos    CT:  Bilateral nonobstructing renal stones. No ureteral stone or hydronephrosis. No  acute abnormality. Subjective:     Chief Complaint: \"I'm going home today! \"  No dyspnea. No N/V. No CP. Objective:     VITALS:   Last 24hrs VS reviewed since prior progress note.  Most recent are:  Visit Vitals  BP (!) 177/94   Pulse 80   Temp 98 °F (36.7 °C)   Resp 18   Ht 5' 9\" (1.753 m)   Wt 120 kg (264 lb 8.8 oz)   SpO2 93%   BMI 39.07 kg/m²     SpO2 Readings from Last 6 Encounters:   21 93%   21 96%   21 97%   21 96%   21 96%   21 96%    O2 Flow Rate (L/min): 3 l/min       Intake/Output Summary (Last 24 hours) at 2021 1022  Last data filed at 2021 4165  Gross per 24 hour   Intake 770 ml   Output 400 ml   Net 370 ml        Telemetry Reviewed     PHYSICAL EXAM:    General   well developed, obese,  appears stated age, in no acute distress  EENT  Normocephalic, Atraumatic, EOMI  Respiratory   Clear To Auscultation bilaterally   Cardiology  Regular Rate and Rythmn    Abdominal  Soft, non-tender, non-distended  Extremities  No clubbing, cyanosis, tr edema             Lab Data Reviewed: (see below)    Medications Reviewed: (see below)    PMH/SH reviewed - no change compared to H&P    ___________________________________________________    Attending Physician: Ezzard Star, MD     ____________________________________________________  MEDICATIONS:  Current Facility-Administered Medications   Medication Dose Route Frequency    clonazePAM (KlonoPIN) tablet 0.5 mg  0.5 mg Oral QHS    pregabalin (LYRICA) capsule 300 mg  300 mg Oral BID    cefTRIAXone (ROCEPHIN) 1 g in 0.9% sodium chloride (MBP/ADV) 50 mL MBP  1 g IntraVENous Q24H    heparin 25,000 units in D5W 250 ml infusion  8.1-25 Units/kg/hr IntraVENous TITRATE    heparin (porcine) injection 2,000 Units  2,000 Units IntraVENous PRN    Or    heparin (porcine) injection 4,000 Units  4,000 Units IntraVENous PRN    sodium chloride (NS) flush 5-40 mL  5-40 mL IntraVENous Q8H    sodium chloride (NS) flush 5-40 mL  5-40 mL IntraVENous PRN    acetaminophen (TYLENOL) tablet 650 mg  650 mg Oral Q6H PRN    Or    acetaminophen (TYLENOL) suppository 650 mg  650 mg Rectal Q6H PRN    polyethylene glycol (MIRALAX) packet 17 g  17 g Oral DAILY PRN    promethazine (PHENERGAN) tablet 12.5 mg  12.5 mg Oral Q6H PRN    Or    ondansetron (ZOFRAN) injection 4 mg  4 mg IntraVENous Q6H PRN    aspirin chewable tablet 81 mg  81 mg Oral DAILY    atorvastatin (LIPITOR) tablet 40 mg  40 mg Oral QHS    carvediloL (COREG) tablet 25 mg  25 mg Oral BID WITH MEALS    isosorbide mononitrate ER (IMDUR) tablet 60 mg  60 mg Oral DAILY    hydrALAZINE (APRESOLINE) tablet 50 mg  50 mg Oral TID    pantoprazole (PROTONIX) tablet 40 mg  40 mg Oral ACB    albuterol-ipratropium (DUO-NEB) 2.5 MG-0.5 MG/3 ML  3 mL Nebulization Q6H PRN    glucose chewable tablet 16 g  4 Tablet Oral PRN    dextrose (D50W) injection syrg 12.5-25 g  25-50 mL IntraVENous PRN    glucagon (GLUCAGEN) injection 1 mg  1 mg IntraMUSCular PRN    insulin lispro (HUMALOG) injection   SubCUTAneous AC&HS        LABS:  Recent Labs     05/22/21  0309   WBC 4.9   HGB 9.9*   HCT 33.4*   *     Recent Labs     05/24/21  0319 05/23/21  0424 05/22/21  0309    142 140   K 3.8 3.8 4.1    109* 107   CO2 29 28 30   BUN 27* 32* 36*   CREA 1.47* 1.52* 1.80*   * 145* 160*   CA 8.8 8.5 8.7   MG  --   --  2.3   PHOS 4.1 4.6  --      No results for input(s): ALT, AP, TBIL, TBILI, TP, ALB, GLOB, GGT, AML, LPSE in the last 72 hours. No lab exists for component: SGOT, GPT, AMYP, HLPSE  Recent Labs     05/24/21  0337 05/23/21  1826 05/23/21  1131   APTT 84.3* 68.3* 62.4*      No results for input(s): FE, TIBC, PSAT, FERR in the last 72 hours. No results for input(s): PH, PCO2, PO2 in the last 72 hours.   Recent Labs     05/24/21  0319   TROIQ <0.05     Lab Results   Component Value Date/Time    Glucose (POC) 167 (H) 05/24/2021 06:33 AM    Glucose (POC) 153 (H) 05/23/2021 08:53 PM    Glucose (POC) 127 (H) 05/23/2021 04:09 PM    Glucose (POC) 213 (H) 05/23/2021 11:01 AM    Glucose (POC) 156 (H) 05/23/2021 06:27 AM

## 2021-05-25 ENCOUNTER — PATIENT OUTREACH (OUTPATIENT)
Dept: CASE MANAGEMENT | Age: 71
End: 2021-05-25

## 2021-05-25 LAB
BACTERIA SPEC CULT: NORMAL
SERVICE CMNT-IMP: NORMAL

## 2021-05-25 RX ORDER — CLOTRIMAZOLE AND BETAMETHASONE DIPROPIONATE 10; .64 MG/G; MG/G
CREAM TOPICAL 2 TIMES DAILY
COMMUNITY

## 2021-05-25 RX ORDER — OMEPRAZOLE 40 MG/1
CAPSULE, DELAYED RELEASE ORAL
Qty: 90 CAPSULE | Refills: 1 | Status: SHIPPED | OUTPATIENT
Start: 2021-05-25 | End: 2021-09-22 | Stop reason: SDUPTHER

## 2021-05-25 NOTE — PROGRESS NOTES
Care Transitions Initial Call    Call within 2 business days of discharge: Yes     Patient: Stephanie Frost Patient : 1950 MRN: [id number]    Last Discharge [address]       Complaint Diagnosis Description Type Department Provider    21 Fall; Altered mental status Acute cystitis without hematuria . .. ED to Hosp-Admission (Discharged) (ADMIT) Hortencia Negron MD; Kena Caldera... Was this an external facility discharge? No     Challenges to be reviewed by the provider   Additional needs identified to be addressed with provider yes  spouse concerned with patients BPs, currently /74 this AM   States BP elevated in the evenings. Advised to continue logging BP readings and take with her for hospital follow up appt         Method of communication with provider : chart routing    Discussed COVID-19 related testing which was not done at this time. Test results were not done. Advance Care Planning:   Does patient have an Advance Directive: yes, reviewed and current     Inpatient Readmission Risk score: Unplanned Readmit Risk Score: 45    Was this a readmission? no   Patient stated reason for the admission: AMS    Patients top risk factors for readmission: falls, medication management and polypharmacy   Interventions to address risk factors: Scheduled appointment with PCP-, Scheduled appointment with Specialist-, Obtained and reviewed discharge summary and/or continuity of care documents and Education of patient/family/caregiver/guardian to support self-management-monitoring BPs    Care Transition Nurse (CTN) contacted the patient by telephone to perform post hospital discharge assessment. Verified name and  with patient as identifiers. Provided introduction to self, and explanation of the CTN role. CTN reviewed discharge instructions, medical action plan and red flags with patient who verbalized understanding. Were discharge instructions available to patient? yes. Reviewed appropriate site of care based on symptoms and resources available to patient including: PCP, Specialist and [address] [place] [address] Ricky Lewis. Patient/spouse given an opportunity to ask questions and does not have any further questions or concerns at this time. The patient agrees to contact the PCP office for questions related to their healthcare. Medication reconciliation was performed with patient, who verbalizes understanding of administration of home medications. Referral to Pharm D needed: no     Home Health/Outpatient orders at discharge: home health care, PT, OT and [address]: [facility]  Date of initial visit: 5/25/2021    Durable Medical Equipment ordered at discharge: None    Covid Risk Education    Educated patient about risk for severe COVID-19 due to risk factors according to CDC guidelines. CTN reviewed discharge instructions, medical action plan and red flag symptoms patient who verbalized understanding. Discussed COVID vaccination status yes. Education provided on COVID-19 vaccination as appropriate. Discussed exposure protocols and quarantine with CDC Guidelines. Patient was given an opportunity to verbalize any questions and concerns and agrees to contact CTN or health care provider for questions related to their healthcare. Was patient discharged with a pulse oximeter? no   Discussed follow-up appointments. If no appointment was previously scheduled, appointment scheduling offered: yes Is follow up appointment scheduled within 7 days of discharge?  yes   [facility] follow up appointment(s):   Future Appointments   Date Time Provider Tammy Perales   6/1/2021 10:30 AM Vince Walls MD [facility] AMB   6/11/2021 11:30 AM [address]   6/14/2021  9:30 AM Vince Walls MD [facility] AMB     [facility] follow up appointment(s): 5/25 [facility]; 7/19 [facility]; 7/7 Dr. Brian Frost, cards    Plan for follow-up call in 7-10 days based on severity of symptoms and risk factors. Plan for next call: self management-BPs and follow up appointment-fu with [facility], PCP  CTN provided contact information for future needs. Goals Addressed                 This Visit's Progress     Attend follow up appointments on schedule          05/25/21   Will attend follow up appts scheduled with [facility], PCP, Nephrology   Will r/s appt with Dr. Rudolph Bryant office for earlier appt to address BP readings         Understands red flags post discharge.           05/25/21   Will perform home BP monitoring and log results for the next thirty days   Will provide copy of BP readings to present to Dr. Lillian Galo for review   Will not have any falls over the next thirty days post discharge

## 2021-05-28 ENCOUNTER — PATIENT OUTREACH (OUTPATIENT)
Dept: CASE MANAGEMENT | Age: 71
End: 2021-05-28

## 2021-06-01 ENCOUNTER — OFFICE VISIT (OUTPATIENT)
Dept: INTERNAL MEDICINE CLINIC | Age: 71
End: 2021-06-01
Payer: MEDICARE

## 2021-06-01 VITALS
TEMPERATURE: 98.2 F | BODY MASS INDEX: 37.8 KG/M2 | WEIGHT: 255.2 LBS | HEART RATE: 80 BPM | SYSTOLIC BLOOD PRESSURE: 118 MMHG | DIASTOLIC BLOOD PRESSURE: 80 MMHG | OXYGEN SATURATION: 96 % | HEIGHT: 69 IN | RESPIRATION RATE: 16 BRPM

## 2021-06-01 DIAGNOSIS — E11.40 TYPE 2 DIABETES MELLITUS WITH DIABETIC NEUROPATHY, WITHOUT LONG-TERM CURRENT USE OF INSULIN (HCC): Chronic | ICD-10-CM

## 2021-06-01 DIAGNOSIS — E11.21 TYPE 2 DIABETES WITH NEPHROPATHY (HCC): ICD-10-CM

## 2021-06-01 DIAGNOSIS — N39.0 URINARY TRACT INFECTION WITHOUT HEMATURIA, SITE UNSPECIFIED: ICD-10-CM

## 2021-06-01 DIAGNOSIS — G93.41 METABOLIC ENCEPHALOPATHY: Primary | ICD-10-CM

## 2021-06-01 LAB
ALBUMIN SERPL-MCNC: 3.6 G/DL (ref 3.5–5)
ALBUMIN/GLOB SERPL: 1 {RATIO} (ref 1.1–2.2)
ALP SERPL-CCNC: 207 U/L (ref 45–117)
ALT SERPL-CCNC: 32 U/L (ref 12–78)
ANION GAP SERPL CALC-SCNC: 8 MMOL/L (ref 5–15)
AST SERPL-CCNC: 28 U/L (ref 15–37)
BASOPHILS # BLD: 0 K/UL (ref 0–0.1)
BASOPHILS NFR BLD: 0 % (ref 0–1)
BILIRUB SERPL-MCNC: 0.7 MG/DL (ref 0.2–1)
BUN SERPL-MCNC: 29 MG/DL (ref 6–20)
BUN/CREAT SERPL: 17 (ref 12–20)
CALCIUM SERPL-MCNC: 9.4 MG/DL (ref 8.5–10.1)
CHLORIDE SERPL-SCNC: 106 MMOL/L (ref 97–108)
CO2 SERPL-SCNC: 30 MMOL/L (ref 21–32)
CREAT SERPL-MCNC: 1.72 MG/DL (ref 0.55–1.02)
DIFFERENTIAL METHOD BLD: ABNORMAL
EOSINOPHIL # BLD: 0.3 K/UL (ref 0–0.4)
EOSINOPHIL NFR BLD: 4 % (ref 0–7)
ERYTHROCYTE [DISTWIDTH] IN BLOOD BY AUTOMATED COUNT: 17.5 % (ref 11.5–14.5)
GLOBULIN SER CALC-MCNC: 3.5 G/DL (ref 2–4)
GLUCOSE SERPL-MCNC: 140 MG/DL (ref 65–100)
HCT VFR BLD AUTO: 41 % (ref 35–47)
HGB BLD-MCNC: 11.8 G/DL (ref 11.5–16)
IMM GRANULOCYTES # BLD AUTO: 0.1 K/UL (ref 0–0.04)
IMM GRANULOCYTES NFR BLD AUTO: 1 % (ref 0–0.5)
LYMPHOCYTES # BLD: 1 K/UL (ref 0.8–3.5)
LYMPHOCYTES NFR BLD: 12 % (ref 12–49)
MCH RBC QN AUTO: 28 PG (ref 26–34)
MCHC RBC AUTO-ENTMCNC: 28.8 G/DL (ref 30–36.5)
MCV RBC AUTO: 97.4 FL (ref 80–99)
MONOCYTES # BLD: 0.5 K/UL (ref 0–1)
MONOCYTES NFR BLD: 6 % (ref 5–13)
NEUTS SEG # BLD: 6.7 K/UL (ref 1.8–8)
NEUTS SEG NFR BLD: 77 % (ref 32–75)
NRBC # BLD: 0 K/UL (ref 0–0.01)
NRBC BLD-RTO: 0 PER 100 WBC
PLATELET # BLD AUTO: 254 K/UL (ref 150–400)
PMV BLD AUTO: 12.3 FL (ref 8.9–12.9)
POTASSIUM SERPL-SCNC: 4.6 MMOL/L (ref 3.5–5.1)
PROT SERPL-MCNC: 7.1 G/DL (ref 6.4–8.2)
RBC # BLD AUTO: 4.21 M/UL (ref 3.8–5.2)
RBC MORPH BLD: ABNORMAL
RBC MORPH BLD: ABNORMAL
SODIUM SERPL-SCNC: 144 MMOL/L (ref 136–145)
WBC # BLD AUTO: 8.6 K/UL (ref 3.6–11)

## 2021-06-01 PROCEDURE — G8427 DOCREV CUR MEDS BY ELIG CLIN: HCPCS | Performed by: INTERNAL MEDICINE

## 2021-06-01 PROCEDURE — 99495 TRANSJ CARE MGMT MOD F2F 14D: CPT | Performed by: INTERNAL MEDICINE

## 2021-06-01 RX ORDER — CYCLOBENZAPRINE HCL 10 MG
TABLET ORAL
Qty: 90 TABLET | Refills: 0 | Status: SHIPPED | OUTPATIENT
Start: 2021-06-01 | End: 2021-09-14 | Stop reason: SDUPTHER

## 2021-06-01 NOTE — PROGRESS NOTES
Emilia Mike is a 78 y.o. female presenting for Transitions Of Care  . 1. Have you been to the ER, urgent care clinic since your last visit? Hospitalized since your last visit?    Admit date: 5/19/2021  Discharge date and time: 5/24/2021    2. Have you seen or consulted any other health care providers outside of the [address] [place] [address] since your last visit? Include any pap smears or colon screening. No    Fall Risk Assessment, last 12 mths 8/13/2020   Able to walk? Yes   Fall in past 12 months? No   Number of falls in past 12 months -   Fall with injury? -         Abuse Screening Questionnaire 4/30/2020   Do you ever feel afraid of your partner? N   Are you in a relationship with someone who physically or mentally threatens you? N   Is it safe for you to go home? Y       3 most recent PHQ Screens 4/30/2020   Little interest or pleasure in doing things Not at all   Feeling down, depressed, irritable, or hopeless Not at all   Total Score PHQ 2 0   Trouble falling or staying asleep, or sleeping too much Not at all   Feeling tired or having little energy Not at all   Poor appetite, weight loss, or overeating Not at all   Feeling bad about yourself - or that you are a failure or have let yourself or your family down Not at all   Trouble concentrating on things such as school, work, reading, or watching TV Not at all   Moving or speaking so slowly that other people could have noticed; or the opposite being so fidgety that others notice Not at all   Thoughts of being better off dead, or hurting yourself in some way Not at all   PHQ 9 Score 0   How difficult have these problems made it for you to do your work, take care of your home and get along with others Not difficult at all       There are no discontinued medications.

## 2021-06-01 NOTE — PROGRESS NOTES
Subjective:     Mrs. Gabriela Osuna presents to the office today and transition of care subsequent to a hospitalization at [facility] from 5/19 until 5/24 when she presented to the emergency room lethargic and unable to provide any meaningful history the patient's history at that time was remarkable in that her Lyrica dose had increased and she was given tramadol for pain. She had also had loose bowel movements for 3 to 4 days and also complained with burning with urination. On the day of admission she had slipped off of her bed and fell on the floor with no loss of consciousness. In the emergency room she was found to be drowsy, slow to respond had an elevated blood pressure and a urinalysis which showed bacteria and pyuria. White blood cell count was 10,500. She was admitted for further evaluation and treatment. The patient was cultured and started empirically on IV Rocephin. CT scans of the head abdomen and pelvis were unrevealing. Blood cultures grew E. coli and 4/4 bottles and urine culture revealed E. coli. The patient's altered mental status improved and her medications were adjusted. She received 5 days of Rocephin while hospitalized and was given an additional 9 days of cefdinir to take. Since then the patient has been doing well and has had no problems with mentation. She has had no fevers, chills. Her weakness has been improving and her appetite has been good. She states that her blood sugars have been under better control.     Past Medical History:   Diagnosis Date    Anxiety 1/22/2018    Arthritis     OA    Asthma     Diabetes ([address]     Essential hypertension     GERD (gastroesophageal reflux disease)     Hypercholesterolemia 1/22/2018    Hypertension     Ill-defined condition     diverticulitis    Long-term use of high-risk medication 1/22/2018    Neuropathy     Obesity (BMI 30-39.9) 4/30/2019    Other ill-defined conditions(799.89)     IBS, spinal stenosis    Plantar fasciitis     Psychiatric disorder     depression, anxiety    Sleep apnea     uses CPAP    Type 2 diabetes mellitus with diabetic neuropathy, without long-term current use of insulin ([facility] [address] 6/5/2016     Past Surgical History:   Procedure Laterality Date    COLONOSCOPY N/A 3/14/2018    COLONOS[facility] performed [facility]iq [facility] ENDOSCOPY    HX APPENDECTOMY      HX CHOLECYSTECTOMY  11/15/2018    HX HYSTERECTOMY      HX PACEMAKER      IR KYPHOPLASTY LUMBAR  12/22/2020    IR KYPHOPLASTY THORACIC  1/6/2021    [place] CARDIAC SURG PROCEDURE UNLIST      stent     Allergies   Allergen Reactions    Sulfa (Sulfonamide Antibiotics) Swelling    Amoxicillin Swelling     Current Outpatient Medications   Medication Sig Dispense Refill    cyclobenzaprine (FLEXERIL) 10 mg tablet TAKE 1 TABLET BY MOUTH THREE TIMES DAILY AS NEEDED FOR MUSCLE SPASM 90 Tablet 0    omeprazole (PRILOSEC) 40 mg capsule TAKE 1 CAPSULE EVERY DAY 90 Capsule 1    clotrimazole-betamethasone (LOTRISONE) topical cream Apply  to affected area two (2) times a day.  L.acidoph & parac-S.therm-Bifido (JONNIE Q2/RISAQUAD-2) 16 billion cell cap cap Take 1 Capsule by mouth daily. 10 Capsule 0    pregabalin (LYRICA) 300 mg capsule Take 1 Capsule by mouth two (2) times a day. Max Daily Amount: 600 mg. 1 Capsule 0    hydrALAZINE (APRESOLINE) 25 mg tablet Take 75 mg by mouth three (3) times daily. Indications: high blood pressure      carvediloL (COREG) 25 mg tablet Take 25 mg by mouth two (2) times daily (with meals).  pantoprazole (PROTONIX) 40 mg tablet Take 1 Tab by mouth daily. 30 Tab 0    torsemide (DEMADEX) 20 mg tablet Take 1 Tab by mouth daily. 90 Tab 2    ferrous sulfate 325 mg (65 mg iron) tablet Take 1 Tab by mouth every other day. 90 Tab 2    mupirocin (BACTROBAN) 2 % ointment Apply  to affected area daily. 30 g 2    clonazePAM (KlonoPIN) 0.5 mg tablet TAKE 1 TABLET BY MOUTH NIGHTLY .  DO NOT EXCEED  0.5  MG  PER  DAY 90 Tab 0    eucalyptus-peppermint oil (Ponaris) soln 1-2 Drops by Nasal route nightly as needed.  triamcinolone acetonide (KENALOG) 0.1 % topical cream APPLY A THIN FILM OF CREAM EXTERNALLY TO AFFECTED AREA TWICE DAILY 15 g 3    isosorbide mononitrate ER (IMDUR) 60 mg CR tablet Take 1 Tab by mouth daily. 90 Tab 3    magnesium oxide (MAG-OX) 400 mg tablet Take 1 tablet by mouth twice daily (Patient taking differently: daily. ) 180 Tab 3    potassium chloride SR (KLOR-CON 10) 10 mEq tablet Take 1 Tab by mouth three (3) times daily. (Patient taking differently: Take 10 mEq by mouth four (4) times daily.) 540 Tab 3    SYMBICORT 160-4.5 mcg/actuation HFAA INHALE 2 PUFFS BY MOUTH TWICE DAILY 1 Inhaler 3    albuterol (PROVENTIL HFA, VENTOLIN HFA, PROAIR HFA) 90 mcg/actuation inhaler Take 1 Puff by inhalation every four (4) hours as needed for Wheezing. (Patient taking differently: Take 1 Puff by inhalation two (2) times daily as needed for Wheezing.) 1 Inhaler 6    dabigatran etexilate (PRADAXA) 150 mg capsule Take 1 Cap by mouth two (2) times a day. IF NO BLEEDING AT CATH SITE. 60 Cap 12    sodium chloride (AYR SALINE) 0.65 % nasal squeeze bottle 2 Sprays by Both Nostrils route every eight (8) hours as needed.  conjugated estrogens (PREMARIN) 0.625 mg/gram vaginal cream Insert 0.5 g into vagina two (2) times a week. Using Daily      venlafaxine-SR (EFFEXOR XR) 150 mg capsule Take 150 mg by mouth two (2) times daily (with meals).  cholecalciferol (Vitamin D3) 25 mcg (1,000 unit) cap Take 1,000 Units by mouth daily.  aspirin 81 mg chewable tablet Take 81 mg by mouth daily.  atorvastatin (LIPITOR) 40 mg tablet Take 1 Tab by mouth nightly.  27 Tab 12     Social History     Socioeconomic History    Marital status:      Spouse name: Not on file    Number of children: Not on file    Years of education: Not on file    Highest education level: Not on file   Tobacco Use    Smoking status: Never Smoker    Smokeless tobacco: Never Used   Vaping Use    Vaping Use: Never used   Substance and Sexual Activity    Alcohol use: No    Drug use: No     Social Determinants of Health     Financial Resource Strain:     Difficulty of Paying Living Expenses:    Food Insecurity:     Worried About Running Out of Food in the Last Year:     [address] in the Last Year:    Transportation Needs:     Lack of Transportation (Medical):      Lack of Transportation (Non-Medical):    Physical Activity:     Days of Exercise per Week:     Minutes of Exercise per Session:    Stress:     Feeling of Stress :    Social Connections:     Frequency of Communication with Friends and Family:     Frequency of Social Gatherings with Friends and Family:     Attends [employer] Services:     Active Member of Clubs or Organizations:     Attends Club or Organization Meetings:     Marital Status:      Family History   Problem Relation Age of Onset   [facility] Arthritis-osteo Mother     Hypertension Mother     High Cholesterol Mother     Crohn's Disease Mother     Heart Disease Mother     Alcohol abuse Father     High Cholesterol Sister     Hypertension Sister     Thyroid Disease Sister     COPD Sister     High Cholesterol Brother     Hypertension Brother     COPD Brother     COPD Child     Inflammatory Bowel Dz Child        Review of Systems:  GEN: no weight loss, weight gain, fatigue or night sweats  CV: no PND, orthopnea, or palpitations  Resp: no dyspnea on exertion, no cough  Abd: no nausea, vomiting or diarrhea  EXT: denies edema, claudication  Endocrine: no hair loss, excessive thirst or polyuria  Neurological ROS: no TIA or stroke symptoms  ROS otherwise negative      Objective:     Visit Vitals  /80 (BP 1 Location: Right upper arm, BP Patient Position: Sitting, BP Cuff Size: Adult)   Pulse 80   Temp 98.2 °F (36.8 °C) (Oral)   Resp 16   Ht 5' 9\" (1.753 m)   Wt 255 lb 3.2 oz (115.8 kg)   SpO2 96%   BMI 37.69 kg/m²     Body mass index is 37.69 kg/m². General:   alert, cooperative and no distress   Eyes: conjunctivae/sclerae clear. PERRL, EOM's intact   Mouth:  No oral lesions, no pharyngeal erythema, no exudates   Neck: Trachea midline, no thyromegaly, no bruits   Heart: S1 and S2 normal,no murmurs noted    Lungs: Clear to auscultation bilaterally, no increased work of breathing   Abdomen: Soft, nontender. Normal bowel sounds   Extremities: No edema or cyanosis   Neuro: ..alert, oriented x3,speech normal in context and clarity, cranial nerves II-XII intact,motor strength: full proximally and distally,gait: normal  reflexes: full and symmetric     Physical exam otherwise negative         Assessment/Plan:     Diagnoses and all orders for this visit:    Metabolic encephalopathy  -     COLLECTION VENOUS BLOOD,VENIPUNCTURE  -     CBC WITH AUTOMATED DIFF; Future  -     METABOLIC PANEL, COMPREHENSIVE; Future    Urinary tract infection without hematuria, site unspecified  -     URINALYSIS W/ REFLEX CULTURE; Future  -     CULTURE, URINE; Future    Type 2 diabetes mellitus with diabetic neuropathy, without long-term current use of insulin ([facility])    Type 2 diabetes with nephropathy ([facility] [place] [address]    Other orders  -     cyclobenzaprine (FLEXERIL) 10 mg tablet; TAKE 1 TABLET BY MOUTH THREE TIMES DAILY AS NEEDED FOR MUSCLE SPASM, Normal, Disp-90 Tablet, R-0        Other instructions: The patient's medications were reviewed and reconciled. No change in her current medical regimen will be made. A diabetic diet and daily blood sugar checks are recommended. Follow-up CBC and CMP will be obtained today. Complete oral antibiotics. We will arrange for follow-up urinalysis and urine culture at the beginning of next week. Follow-up as previously appointed. Follow-up and Dispositions    · Return for As previously scheduled.          John Nascimento MD    Please note that this dictation was completed with [employer], the computer voice recognition software. Quite often unanticipated grammatical, syntax, homophones, and other interpretive errors are inadvertently transcribed by the computer software. Please disregard these errors. Please excuse any errors that have escaped final proofreading.

## 2021-06-07 LAB
APPEARANCE UR: CLEAR
BACTERIA URNS QL MICRO: ABNORMAL /HPF
BILIRUB UR QL: NEGATIVE
COLOR UR: ABNORMAL
EPITH CASTS URNS QL MICRO: ABNORMAL /LPF
GLUCOSE UR STRIP.AUTO-MCNC: NEGATIVE MG/DL
HGB UR QL STRIP: NEGATIVE
HYALINE CASTS URNS QL MICRO: ABNORMAL /LPF (ref 0–5)
KETONES UR QL STRIP.AUTO: NEGATIVE MG/DL
LEUKOCYTE ESTERASE UR QL STRIP.AUTO: ABNORMAL
NITRITE UR QL STRIP.AUTO: NEGATIVE
PH UR STRIP: 5.5 [PH] (ref 5–8)
PROT UR STRIP-MCNC: NEGATIVE MG/DL
RBC #/AREA URNS HPF: ABNORMAL /HPF (ref 0–5)
SP GR UR REFRACTOMETRY: 1.01 (ref 1–1.03)
UA: UC IF INDICATED,UAUC: ABNORMAL
UROBILINOGEN UR QL STRIP.AUTO: 0.2 EU/DL (ref 0.2–1)
WBC URNS QL MICRO: ABNORMAL /HPF (ref 0–4)

## 2021-06-09 LAB
BACTERIA SPEC CULT: ABNORMAL
CC UR VC: ABNORMAL
SERVICE CMNT-IMP: ABNORMAL

## 2021-06-10 ENCOUNTER — TELEPHONE (OUTPATIENT)
Dept: INTERNAL MEDICINE CLINIC | Age: 71
End: 2021-06-10

## 2021-06-10 RX ORDER — NITROFURANTOIN 25; 75 MG/1; MG/1
100 CAPSULE ORAL 2 TIMES DAILY
Qty: 14 CAPSULE | Refills: 0 | Status: SHIPPED | OUTPATIENT
Start: 2021-06-10 | End: 2021-06-17

## 2021-06-10 RX ORDER — AMOXICILLIN 500 MG/1
500 CAPSULE ORAL 3 TIMES DAILY
Qty: 21 CAPSULE | Refills: 0 | Status: CANCELLED | OUTPATIENT
Start: 2021-06-10

## 2021-06-10 NOTE — TELEPHONE ENCOUNTER
Patient notified of lab results- please send pended Rx to [facility]:  Requested Prescriptions     Pending Prescriptions Disp Refills    amoxicillin (AMOXIL) 500 mg capsule 21 Capsule 0     Sig: Take 1 Capsule by mouth three (3) times daily.

## 2021-06-10 NOTE — TELEPHONE ENCOUNTER
----- Message from Wendi Khan MD sent at 6/9/2021 12:53 PM EDT -----  Urine culture shows infection.   Treat with amoxicillin 500 mg 3 times daily #21

## 2021-06-11 ENCOUNTER — HOSPITAL ENCOUNTER (OUTPATIENT)
Dept: INFUSION THERAPY | Age: 71
Discharge: HOME | End: 2021-06-11

## 2021-06-14 ENCOUNTER — PATIENT OUTREACH (OUTPATIENT)
Dept: CASE MANAGEMENT | Age: 71
End: 2021-06-14

## 2021-06-14 NOTE — PROGRESS NOTES
Care Transitions Follow Up Call    Challenges to be reviewed by the provider   Additional needs identified to be addressed with provider:   Pradaxa resumed       Method of communication with provider : none    Care Transition Nurse (CTN) contacted the patient by telephone to follow up after admission on 2021. Verified name and  with patient as identifiers. Addressed changes since last contact: none  Follow up appointment completed? yes. Was follow up appointment scheduled within 7 days of discharge? yes. Advance Care Planning:   Does patient have an Advance Directive: yes, reviewed and current. CTN reviewed discharge instructions, medical action plan and red flags with patient/spouse and discussed any barriers to care and/or understanding of plan of care after discharge. Discussed appropriate site of care based on symptoms and resources available to patient including: PCP and Specialist. The patient agrees to contact the PCP office for questions related to their healthcare. Patients top risk factors for readmission: medication management   Interventions to address risk factors: Scheduled appointment with Maureen Hernandez [facility] follow up appointment(s):   Future Appointments   Date Time Provider Tammy Perales   2021  1:30 PM Alvin Gomez MD PCA [facility]     Non-BS follow up appointment(s):  Dr. Lele Dooley    CTN provided contact information for future needs. Plan for follow-up call in 10-14 days based on severity of symptoms and risk factors.   Plan for next call: medication management-Pradaxa     Goals Addressed                 This Visit's Progress     Attend follow up appointments on schedule          21   Will attend follow up appts scheduled with [facility], PCP, Nephrology   Will r/s appt with Dr. Rachael Harrington office for earlier appt to address BP readings    21   Attended follow up appt with PCP       Understands red flags post discharge.    On track       05/25/21   Will perform home BP monitoring and log results for the next thirty days   Will provide copy of BP readings to present to Dr. Lex Ross for review  Esperanza Cushing Will not have any falls over the next thirty days post discharge    06/14/21   Will complete abx to treat UTI   Pradaxa resumed today   Will monitor for bleeding

## 2021-06-28 ENCOUNTER — PATIENT OUTREACH (OUTPATIENT)
Dept: CASE MANAGEMENT | Age: 71
End: 2021-06-28

## 2021-06-28 NOTE — PROGRESS NOTES
Patient has graduated from the Transitions of Care Coordination  program on 6/28/2021. Patient/family has the ability to self-manage at this time Care management goals have been completed. Patient was not referred to the [facility] team for further management. Goals Addressed                 This Visit's Progress     COMPLETED: Attend follow up appointments on schedule          05/25/21   Will attend follow up appts scheduled with [facility], PCP, Nephrology   Will r/s appt with Dr. Crista Lester office for earlier appt to address BP readings    06/14/21   Attended follow up appt with PCP       COMPLETED: Understands red flags post discharge. 05/25/21   Will perform home BP monitoring and log results for the next thirty days   Will provide copy of BP readings to present to Dr. Lou Marquez for review  Cheryl Braun Will not have any falls over the next thirty days post discharge    06/14/21   Will complete abx to treat UTI   Pradaxa resumed today   Will monitor for bleeding            Patient has Care Transition Nurse's contact information for any further questions, concerns, or needs.   Patients upcoming visits:    Future Appointments   Date Time Provider Tammy Perales   7/8/2021  1:30 PM Mckenna Walls MD [facility]

## 2021-07-08 ENCOUNTER — OFFICE VISIT (OUTPATIENT)
Dept: INTERNAL MEDICINE CLINIC | Age: 71
End: 2021-07-08
Payer: MEDICARE

## 2021-07-08 VITALS
BODY MASS INDEX: 38.51 KG/M2 | HEIGHT: 69 IN | RESPIRATION RATE: 20 BRPM | WEIGHT: 260 LBS | OXYGEN SATURATION: 94 % | SYSTOLIC BLOOD PRESSURE: 122 MMHG | TEMPERATURE: 98.3 F | DIASTOLIC BLOOD PRESSURE: 70 MMHG | HEART RATE: 80 BPM

## 2021-07-08 DIAGNOSIS — E78.00 HYPERCHOLESTEROLEMIA: Chronic | ICD-10-CM

## 2021-07-08 DIAGNOSIS — I25.118 CORONARY ARTERY DISEASE OF NATIVE HEART WITH STABLE ANGINA PECTORIS, UNSPECIFIED VESSEL OR LESION TYPE (HCC): ICD-10-CM

## 2021-07-08 DIAGNOSIS — E11.40 TYPE 2 DIABETES MELLITUS WITH DIABETIC NEUROPATHY, WITHOUT LONG-TERM CURRENT USE OF INSULIN (HCC): Chronic | ICD-10-CM

## 2021-07-08 DIAGNOSIS — I50.22 SYSTOLIC CHF, CHRONIC (HCC): Chronic | ICD-10-CM

## 2021-07-08 DIAGNOSIS — I48.20 CHRONIC ATRIAL FIBRILLATION (HCC): Chronic | ICD-10-CM

## 2021-07-08 DIAGNOSIS — Z79.899 LONG-TERM USE OF HIGH-RISK MEDICATION: Chronic | ICD-10-CM

## 2021-07-08 DIAGNOSIS — I10 ESSENTIAL HYPERTENSION: Chronic | ICD-10-CM

## 2021-07-08 DIAGNOSIS — I25.5 ISCHEMIC CARDIOMYOPATHY: Chronic | ICD-10-CM

## 2021-07-08 DIAGNOSIS — Z00.00 MEDICARE ANNUAL WELLNESS VISIT, SUBSEQUENT: Primary | ICD-10-CM

## 2021-07-08 PROCEDURE — 3017F COLORECTAL CA SCREEN DOC REV: CPT | Performed by: INTERNAL MEDICINE

## 2021-07-08 PROCEDURE — 1101F PT FALLS ASSESS-DOCD LE1/YR: CPT | Performed by: INTERNAL MEDICINE

## 2021-07-08 PROCEDURE — G8752 SYS BP LESS 140: HCPCS | Performed by: INTERNAL MEDICINE

## 2021-07-08 PROCEDURE — G8399 PT W/DXA RESULTS DOCUMENT: HCPCS | Performed by: INTERNAL MEDICINE

## 2021-07-08 PROCEDURE — G0439 PPPS, SUBSEQ VISIT: HCPCS | Performed by: INTERNAL MEDICINE

## 2021-07-08 PROCEDURE — 3044F HG A1C LEVEL LT 7.0%: CPT | Performed by: INTERNAL MEDICINE

## 2021-07-08 PROCEDURE — G8417 CALC BMI ABV UP PARAM F/U: HCPCS | Performed by: INTERNAL MEDICINE

## 2021-07-08 PROCEDURE — 99214 OFFICE O/P EST MOD 30 MIN: CPT | Performed by: INTERNAL MEDICINE

## 2021-07-08 PROCEDURE — G8754 DIAS BP LESS 90: HCPCS | Performed by: INTERNAL MEDICINE

## 2021-07-08 PROCEDURE — G8536 NO DOC ELDER MAL SCRN: HCPCS | Performed by: INTERNAL MEDICINE

## 2021-07-08 PROCEDURE — G8427 DOCREV CUR MEDS BY ELIG CLIN: HCPCS | Performed by: INTERNAL MEDICINE

## 2021-07-08 PROCEDURE — 2022F DILAT RTA XM EVC RTNOPTHY: CPT | Performed by: INTERNAL MEDICINE

## 2021-07-08 PROCEDURE — G9717 DOC PT DX DEP/BP F/U NT REQ: HCPCS | Performed by: INTERNAL MEDICINE

## 2021-07-08 PROCEDURE — 1090F PRES/ABSN URINE INCON ASSESS: CPT | Performed by: INTERNAL MEDICINE

## 2021-07-08 NOTE — PROGRESS NOTES
Veronica Reveles is a 78 y.o. female and presents with Follow Up Chronic Condition (3 mo fu) and Annual Wellness Visit  . Subjective:  Mrs. Sherry Monroe returns to the office today for Medicare wellness check and follow-up of multiple medical problems. Patient has type 2 diabetes mellitus that has been complicated by diabetic neuropathy. She currently is not on any medication for her type 2 diabetes. She checks her blood sugar once daily with average fasting blood sugars ranging between one hundred and twenty and one hundred and sixty. She denies polyuria, polydipsia or blurred vision. She does have significant neuropathy for which she is taking Lyrica. This seems to be controlling her discomfort. GERD is managed on Prilosec therapy. She denies breakthrough heartburn and has had no dysphagia, early satiety or unexplained weight loss. Blood pressure currently managed on hydralazine carvedilol, torsemide. She tolerates this without orthostatic dizziness, lower extremity edema, fatigue or palpitations. Denies headaches, numbness, tingling or focal neurological problems. Lipitor is being used to control her hypercholesterolemia. She denies muscle soreness or GI upset. She does have a history of coronary artery disease associated with ischemic cardiomyopathy and chronic systolic heart failure. Patient denies any chest pains, claudication and has had no PND, orthopnea or dyspnea on exertion. She has chronic atrial fibrillation which has been controlled. She is currently on Eliquis having previously been on Pradaxa. Patient has had no strokelike symptoms, palpitations or syncope. She is due to have a watchman procedure done in the near future.     Past Medical History:   Diagnosis Date    Anxiety 1/22/2018    Arthritis     OA    Asthma     Diabetes ([facility] [address]     Essential hypertension     GERD (gastroesophageal reflux disease)     Hypercholesterolemia 1/22/2018    Hypertension     Ill-defined condition     diverticulitis    Long-term use of high-risk medication 1/22/2018    Neuropathy     Obesity (BMI 30-39.9) 4/30/2019    Other ill-defined conditions(799.89)     IBS, spinal stenosis    Plantar fasciitis     Psychiatric disorder     depression, anxiety    Sleep apnea     uses CPAP    Type 2 diabetes mellitus with diabetic neuropathy, without long-term current use of insulin ([facility] [address] 6/5/2016     Past Surgical History:   Procedure Laterality Date    COLONOSCOPY N/A 3/14/2018    COLONOSCOPY performed by Agata Barrientos MD at [facility] ENDOSCOPY    HX APPENDECTOMY      HX CHOLECYSTECTOMY  11/15/2018    HX HYSTERECTOMY      HX PACEMAKER      IR KYPHOPLASTY LUMBAR  12/22/2020    IR KYPHOPLASTY THORACIC  1/6/2021    IL CARDIAC SURG PROCEDURE UNLIST      stent     Allergies   Allergen Reactions    Sulfa (Sulfonamide Antibiotics) Swelling    Amoxicillin Swelling     Current Outpatient Medications   Medication Sig Dispense Refill    cyclobenzaprine (FLEXERIL) 10 mg tablet TAKE 1 TABLET BY MOUTH THREE TIMES DAILY AS NEEDED FOR MUSCLE SPASM 90 Tablet 0    omeprazole (PRILOSEC) 40 mg capsule TAKE 1 CAPSULE EVERY DAY 90 Capsule 1    clotrimazole-betamethasone (LOTRISONE) topical cream Apply  to affected area two (2) times a day.  L.acidoph & parac-S.therm-Bifido (JONNIE Q2/RISAQUAD-2) 16 billion cell cap cap Take 1 Capsule by mouth daily. 10 Capsule 0    pregabalin (LYRICA) 300 mg capsule Take 1 Capsule by mouth two (2) times a day. Max Daily Amount: 600 mg. 1 Capsule 0    hydrALAZINE (APRESOLINE) 25 mg tablet Take 75 mg by mouth three (3) times daily. Indications: high blood pressure      carvediloL (COREG) 25 mg tablet Take 25 mg by mouth two (2) times daily (with meals).  pantoprazole (PROTONIX) 40 mg tablet Take 1 Tab by mouth daily. 30 Tab 0    torsemide (DEMADEX) 20 mg tablet Take 1 Tab by mouth daily.  90 Tab 2    ferrous sulfate 325 mg (65 mg iron) tablet Take 1 Tab by mouth every other day. 90 Tab 2    mupirocin (BACTROBAN) 2 % ointment Apply  to affected area daily. 30 g 2    clonazePAM (KlonoPIN) 0.5 mg tablet TAKE 1 TABLET BY MOUTH NIGHTLY . DO NOT EXCEED  0.5  MG  PER  DAY 90 Tab 0    eucalyptus-peppermint oil (Ponaris) soln 1-2 Drops by Nasal route nightly as needed.  triamcinolone acetonide (KENALOG) 0.1 % topical cream APPLY A THIN FILM OF CREAM EXTERNALLY TO AFFECTED AREA TWICE DAILY 15 g 3    isosorbide mononitrate ER (IMDUR) 60 mg CR tablet Take 1 Tab by mouth daily. 90 Tab 3    magnesium oxide (MAG-OX) 400 mg tablet Take 1 tablet by mouth twice daily (Patient taking differently: daily. ) 180 Tab 3    potassium chloride SR (KLOR-CON 10) 10 mEq tablet Take 1 Tab by mouth three (3) times daily. (Patient taking differently: Take 10 mEq by mouth four (4) times daily.) 540 Tab 3    SYMBICORT 160-4.5 mcg/actuation HFAA INHALE 2 PUFFS BY MOUTH TWICE DAILY 1 Inhaler 3    albuterol (PROVENTIL HFA, VENTOLIN HFA, PROAIR HFA) 90 mcg/actuation inhaler Take 1 Puff by inhalation every four (4) hours as needed for Wheezing. (Patient taking differently: Take 1 Puff by inhalation two (2) times daily as needed for Wheezing.) 1 Inhaler 6    dabigatran etexilate (PRADAXA) 150 mg capsule Take 1 Cap by mouth two (2) times a day. IF NO BLEEDING AT CATH SITE. 60 Cap 12    sodium chloride (AYR SALINE) 0.65 % nasal squeeze bottle 2 Sprays by Both Nostrils route every eight (8) hours as needed.  conjugated estrogens (PREMARIN) 0.625 mg/gram vaginal cream Insert 0.5 g into vagina two (2) times a week. Using Daily      venlafaxine-SR (EFFEXOR XR) 150 mg capsule Take 150 mg by mouth two (2) times daily (with meals).  cholecalciferol (Vitamin D3) 25 mcg (1,000 unit) cap Take 1,000 Units by mouth daily.  aspirin 81 mg chewable tablet Take 81 mg by mouth daily.  atorvastatin (LIPITOR) 40 mg tablet Take 1 Tab by mouth nightly.  30 Tab 12     Social History     Socioeconomic History  Marital status:      Spouse name: Not on file    Number of children: Not on file    Years of education: Not on file    Highest education level: Not on file   Tobacco Use    Smoking status: Never Smoker    Smokeless tobacco: Never Used   Vaping Use    Vaping Use: Never used   Substance and Sexual Activity    Alcohol use: No    Drug use: No     Social Determinants of Health     Financial Resource Strain:     Difficulty of Paying Living Expenses:    Food Insecurity:     Worried About Running Out of Food in the Last Year:     [address] in the Last Year:    Transportation Needs:     Lack of Transportation (Medical):      Lack of Transportation (Non-Medical):    Physical Activity:     Days of Exercise per Week:     Minutes of Exercise per Session:    Stress:     Feeling of Stress :    Social Connections:     Frequency of Communication with Friends and Family:     Frequency of Social Gatherings with Friends and Family:     Attends Muslim Services:     Active Member of Clubs or Organizations:     Attends Club or Organization Meetings:     Marital Status:      Family History   Problem Relation Age of Onset   [facility] Arthritis-osteo Mother     Hypertension Mother     High Cholesterol Mother     Crohn's Disease Mother     Heart Disease Mother     Alcohol abuse Father     High Cholesterol Sister     Hypertension Sister     Thyroid Disease Sister     COPD Sister     High Cholesterol Brother     Hypertension Brother     COPD Brother     COPD Child     Inflammatory Bowel Dz Child        Health Maintenance   Topic Date Due    DTaP/Tdap/Td series (1 - Tdap) Never done    Shingrix Vaccine Age 50> (1 of 2) Never done    Breast Cancer Screen Mammogram  04/04/2020    Pneumococcal 65+ years (2 of 2 - PPSV23) 10/11/2020    MICROALBUMIN Q1  05/01/2021    Flu Vaccine (1) 09/01/2021    Foot Exam Q1  10/28/2021    Eye Exam Retinal or Dilated  12/11/2021    Lipid Screen  03/08/2022    A1C test (Diabetic or Prediabetic)  05/19/2022    Medicare Yearly Exam  07/09/2022    Colorectal Cancer Screening Combo  03/14/2028    Hepatitis C Screening  Completed    Bone Densitometry (Dexa) Screening  Completed    COVID-19 Vaccine  Completed        Review of Systems  Constitutional: negative for fevers, chills, anorexia and weight loss  Eyes:   negative for visual disturbance and irritation  ENT:   negative for tinnitus,sore throat,nasal congestion,ear pain,hoarseness  Respiratory:  negative for cough, hemoptysis, dyspnea,wheezing  CV:   negative for chest pain, palpitations, lower extremity edema  GI:   negative for nausea, vomiting, diarrhea, abdominal pain,melena  Endo:               negative for polyuria,polydipsia,polyphagia,heat intolerance  Genitourinary: negative for frequency, dysuria and hematuria  Integumentary: negative for rash and pruritus  Hematologic:  negative for easy bruising and gum/nose bleeding  Musculoskel: negative for myalgias, arthralgias, back pain, muscle weakness, joint pain  Neurological:  negative for headaches, dizziness, vertigo, memory problems and gait   Behavl/Psych: negative for feelings of anxiety, depression, mood changes  ROS otherwise negative      Objective:  Visit Vitals  /70 (BP 1 Location: Left upper arm, BP Patient Position: Sitting, BP Cuff Size: Adult)   Pulse 80   Temp 98.3 °F (36.8 °C) (Oral)   Resp 20   Ht 5' 9\" (1.753 m)   Wt 260 lb (117.9 kg)   SpO2 94%   BMI 38.40 kg/m²     Body mass index is 38.4 kg/m².     Physical Exam:   General appearance - alert, well appearing, and in no distress  Mental status - alert, oriented to person, place, and time  EYE-VIVEK, EOMI,conjunctiva normal bilaterally, lids normal  ENT-ENT exam normal, no neck nodes or sinus tenderness  Nose - normal and patent, no erythema,  Or discharge   Mouth - mucous membranes moist, pharynx normal without lesions  Neck - supple, no significant adenopathy or bruit  Chest - clear to auscultation, no wheezes, rales or rhonchi. Heart - normal rate, regular rhythm, normal S1, S2, no murmurs, rubs, clicks or gallops   Abdomen - soft, nontender, nondistended, no masses or organomegaly  Lymph- no adenopathy palpable  Ext-trace pretibial edema is present with hyper pigmentary changes of the anterior tibial region. Skin-Warm and dry. no hyperpigmentation, vitiligo, or suspicious lesions  Neuro -alert, oriented, normal speech, no focal findings or movement disorder noted    In addition this patient is seen for AWV  as detailed below: This is a Subsequent Medicare Annual Wellness Exam (AWV) (Performed 12 months after IPPE or effective date of Medicare Part B enrollment)    I have reviewed the patient's medical history in detail and updated the computerized patient record. Problem list reviewed with patient and risk factors discussed. PSH, SH, FH, Medications and HM issues also reviewed and discussed. Depression screen, fall risk assessment, functional abilities and ACP also reviewed and discussed as above and below.     Depression Risk Factor Screening:     3 most recent PHQ Screens 7/8/2021   Little interest or pleasure in doing things Not at all   Feeling down, depressed, irritable, or hopeless Not at all   Total Score PHQ 2 0   Trouble falling or staying asleep, or sleeping too much Not at all   Feeling tired or having little energy Not at all   Poor appetite, weight loss, or overeating Not at all   Feeling bad about yourself - or that you are a failure or have let yourself or your family down Not at all   Trouble concentrating on things such as school, work, reading, or watching TV Not at all   Moving or speaking so slowly that other people could have noticed; or the opposite being so fidgety that others notice Not at all   Thoughts of being better off dead, or hurting yourself in some way Not at all   PHQ 9 Score 0   How difficult have these problems made it for you to do your work, take care of your home and get along with others Not difficult at all     Alcohol Risk Factor Screening:   Do you average more than 1 drink per night or more than 7 drinks a week:  No    On any one occasion in the past three months have you have had more than 3 drinks containing alcohol:  No    Functional Ability and Level of Safety:   Hearing Loss  Hearing is good. Activities of Daily Living  The home contains: handrails and grab bars  Patient does total self care    Fall Risk  Fall Risk Assessment, last 12 mths 7/8/2021   Able to walk? Yes   Fall in past 12 months? 0   Do you feel unsteady? 0   Are you worried about falling 0   Number of falls in past 12 months -   Fall with injury? -       Abuse Screen  Patient is not abused    Cognitive Screening   Evaluation of Cognitive Function:  Has your family/caregiver stated any concerns about your memory: no  Normal    Patient Care Team   Patient Care Team:  Cj Layton MD as PCP - General (Internal Medicine)  Cj Layton MD as PCP - [address] [place] [address] Provider  Marivel Pollard MD (Nephrology)  Reno Goodell, MD (Cardiology)  Denise Gutierrez MD (Female Pelvic Medicine and Reconstructive Surgery)  Darshana Hickey MD (Pulmonary Disease)    Assessment/Plan   Education and counseling provided:  Are appropriate based on today's review and evaluation    Assessment/Plan:   Impressions:      ICD-10-CM ICD-9-CM    1. Medicare annual wellness visit, subsequent  Z00.00 V70.0    2. Type 2 diabetes mellitus with diabetic neuropathy, without long-term current use of insulin (HCC)  E11.40 250.60 HEMOGLOBIN A1C WITH EAG     357.2 MICROALBUMIN, UR, RAND W/ MICROALB/CREAT RATIO   3. Essential hypertension  I10 401.9 COLLECTION VENOUS BLOOD,VENIPUNCTURE      CBC WITH AUTOMATED DIFF      METABOLIC PANEL, COMPREHENSIVE      URINALYSIS W/ REFLEX CULTURE   4. Hypercholesterolemia  E78.00 272.0 LIPID PANEL      TSH 3RD GENERATION   5.  Long-term use of high-risk medication Z79.899 V58.69 CK   6. Coronary artery disease of native heart with stable angina pectoris, unspecified vessel or lesion type ([facility] [address]  I25.118 414.01      413.9    7. Ischemic cardiomyopathy  I25.5 414.8    8. Systolic CHF, chronic (HCC)  I50.22 428.22      428.0    9. Chronic atrial fibrillation (HCC)  I48.20 427.31         Plan:  1. Continue present meds  2. Lifestyle modifications including Na restriction, low carb/fat diet, weight reduction and exercise (at least a walking program). Follow-up and Dispositions    · Return in about 6 months (around 1/8/2022). Orders Placed This Encounter    CBC WITH AUTOMATED DIFF     Standing Status:   Future     Standing Expiration Date:   7/8/2022    CK     Standing Status:   Future     Standing Expiration Date:   7/8/2022    HEMOGLOBIN A1C WITH EAG     Standing Status:   Future     Standing Expiration Date:   7/8/2022    LIPID PANEL     Standing Status:   Future     Standing Expiration Date:   7/9/3706    METABOLIC PANEL, COMPREHENSIVE     Standing Status:   Future     Standing Expiration Date:   7/8/2022    MICROALBUMIN, UR, RAND W/ MICROALB/CREAT RATIO     Standing Status:   Future     Standing Expiration Date:   7/8/2022    TSH 3RD GENERATION     Standing Status:   Future     Standing Expiration Date:   7/8/2022    URINALYSIS W/ REFLEX CULTURE     Standing Status:   Future     Standing Expiration Date:   7/8/2022    COLLECTION VENOUS BLOOD,VENIPUNCTURE       Reta Smith MD   Assessment/Plan:  Diagnoses and all orders for this visit:    Medicare annual wellness visit, subsequent    Type 2 diabetes mellitus with diabetic neuropathy, without long-term current use of insulin ([facility]  -     HEMOGLOBIN A1C WITH EAG; Future  -     MICROALBUMIN, UR, RAND W/ MICROALB/CREAT RATIO; Future    Essential hypertension  -     COLLECTION VENOUS BLOOD,VENIPUNCTURE  -     CBC WITH AUTOMATED DIFF; Future  -     METABOLIC PANEL, COMPREHENSIVE;  Future  - URINALYSIS W/ REFLEX CULTURE; Future    Hypercholesterolemia  -     LIPID PANEL; Future  -     TSH 3RD GENERATION; Future    Long-term use of high-risk medication  -     CK; Future    Coronary artery disease of native heart with stable angina pectoris, unspecified vessel or lesion type (HCC)    Ischemic cardiomyopathy    Systolic CHF, chronic (HCC)    Chronic atrial fibrillation [facility])        Health Maintenance Due   Topic Date Due    DTaP/Tdap/Td series (1 - Tdap) Never done    Shingrix Vaccine Age 50> (1 of 2) Never done    Breast Cancer Screen Mammogram  04/04/2020    Pneumococcal 65+ years (2 of 2 - PPSV23) 10/11/2020    MICROALBUMIN Q1  05/01/2021       Other instructions: The patient's medications were reviewed and reconciled. No change in her current medical regimen will be made. A no added salt, prudent diet is encouraged. Weight loss recommended with body mass index of 38.4    Continue to check blood sugars once daily    Health maintenance issues were reviewed and she is overdue for mammography which has been strongly encouraged. Age-appropriate vaccinations were reviewed and Tdap, shingles and Pneumovax twenty-three vaccinations have been encouraged. Await results of multiple labs    Controlled substance contract is renewed today. Follow-up in 6 months    Follow-up and Dispositions    · Return in about 6 months (around 1/8/2022). I have reviewed with the patient details of the assessment and plan and all questions were answered. Relevent patient education was performed. The most recent lab findings were reviewed with the patient. An After Visit Summary was printed and given to the patient. Lupillo Sesay MD    Please note that this dictation was completed with [employer], the [employer] voice recognition software. Quite often unanticipated grammatical, syntax, homophones, and other interpretive errors are inadvertently transcribed by the computer software.   Please disregard these errors. Please excuse any errors that have escaped final proofreading.

## 2021-07-08 NOTE — PATIENT INSTRUCTIONS
The best way to stay healthy is to live a healthy lifestyle. A healthy lifestyle includes regular exercise, eating a well-balanced diet, keeping a healthy weight and not smoking. Regular physical exams and screening tests are another important way to take care of yourself. Preventive exams provided by health care providers can find health problems early when treatment works best and can keep you from getting certain diseases or illnesses. Preventive services include exams, lab tests, screenings, shots, monitoring and information to help you take care of your own health. All people over 65 should have a pneumonia shot. Pneumonia shots are usually only needed once in a lifetime unless your doctor decides differently. In addition to your physical exam, some screening tests are recommended:    All people over 65 should have a yearly flu shot. People over 65 are at medium to high risk for Hepatitis B. Three shots are needed for complete protection. Bone mass measurement (dexa scan) is recommended every two years. Diabetes Mellitus screening is recommended every year. Glaucoma is an eye disease caused by high pressure in the eye. An eye exam is recommended every year. Cardiovascular screening tests that check your cholesterol and other blood fat (lipid) levels are recommended every five years. Colorectal Cancer screening tests help to find pre-cancerous polyps (growths in the colon) so they can be removed before they turn into cancer. Tests ordered for screening depend on your personal and family history risk factors. Prostate Cancer Screening (annually up to age 76)    Screening for breast cancer is recommended yearly with a Mammogram.    Screening for cervical and vaginal cancer is recommended with a pelvic and Pap test every two years.  However if you have had an abnormal pap in the past  three years or at high risk for cervical or vaginal cancer Medicare will cover a pap test and a pelvic exam every year. Here is a list of your current Health Maintenance items with a due date:  Health Maintenance Due   Topic Date Due    DTaP/Tdap/Td  (1 - Tdap) Never done    Shingles Vaccine (1 of 2) Never done    Mammogram  04/04/2020    Pneumococcal Vaccine (2 of 2 - PPSV23) 10/11/2020    Annual Well Visit  05/01/2021    Albumin Urine Test  05/01/2021          Learning About Carbohydrate (Carb) Counting and Eating Out When You Have Diabetes  Why plan your meals? Meal planning can be a key part of managing diabetes. Planning meals and snacks with the right balance of carbohydrate, protein, and fat can help you keep your blood sugar at the target level you set with your doctor. You don't have to eat special foods. You can eat what your family eats, including sweets once in a while. But you do have to pay attention to how often you eat and how much you eat of certain foods. You may want to work with a dietitian or a certified diabetes educator. He or she can give you tips and meal ideas and can answer your questions about meal planning. This health professional can also help you reach a healthy weight if that is one of your goals. What should you know about eating carbs? Managing the amount of carbohydrate (carbs) you eat is an important part of healthy meals when you have diabetes. Carbohydrate is found in many foods. · Learn which foods have carbs. And learn the amounts of carbs in different foods. ? Bread, cereal, pasta, and rice have about 15 grams of carbs in a serving. A serving is 1 slice of bread (1 ounce), ½ cup of cooked cereal, or 1/3 cup of cooked pasta or rice. ? Fruits have 15 grams of carbs in a serving. A serving is 1 small fresh fruit, such as an apple or orange; ½ of a banana; ½ cup of cooked or canned fruit; ½ cup of fruit juice; 1 cup of melon or raspberries; or 2 tablespoons of dried fruit. ? Milk and no-sugar-added yogurt have 15 grams of carbs in a serving.  A serving is 1 cup of milk or 2/3 cup of no-sugar-added yogurt. ? Starchy vegetables have 15 grams of carbs in a serving. A serving is ½ cup of mashed potatoes or sweet potato; 1 cup winter squash; ½ of a small baked potato; ½ cup of cooked beans; or ½ cup cooked corn or green peas. · Learn how much carbs to eat each day and at each meal. A dietitian or CDE can teach you how to keep track of the amount of carbs you eat. This is called carbohydrate counting. · If you are not sure how to count carbohydrate grams, use the Plate Method to plan meals. It is a good, quick way to make sure that you have a balanced meal. It also helps you spread carbs throughout the day. ? Divide your plate by types of foods. Put non-starchy vegetables on half the plate, meat or other protein food on one-quarter of the plate, and a grain or starchy vegetable in the final quarter of the plate. To this you can add a small piece of fruit and 1 cup of milk or yogurt, depending on how many carbs you are supposed to eat at a meal.  · Try to eat about the same amount of carbs at each meal. Do not \"save up\" your daily allowance of carbs to eat at one meal.  · Proteins have very little or no carbs per serving. Examples of proteins are beef, chicken, turkey, fish, eggs, tofu, cheese, cottage cheese, and peanut butter. A serving size of meat is 3 ounces, which is about the size of a deck of cards. Examples of meat substitute serving sizes (equal to 1 ounce of meat) are 1/4 cup of cottage cheese, 1 egg, 1 tablespoon of peanut butter, and ½ cup of tofu. How can you eat out and still eat healthy? · Learn to estimate the serving sizes of foods that have carbohydrate. If you measure food at home, it will be easier to estimate the amount in a serving of restaurant food. · If the meal you order has too much carbohydrate (such as potatoes, corn, or baked beans), ask to have a low-carbohydrate food instead. Ask for a salad or green vegetables.   · If you use insulin, check your blood sugar before and after eating out to help you plan how much to eat in the future. · If you eat more carbohydrate at a meal than you had planned, take a walk or do other exercise. This will help lower your blood sugar. What are some tips for eating healthy? · Limit saturated fat, such as the fat from meat and dairy products. This is a healthy choice because people who have diabetes are at higher risk of heart disease. So choose lean cuts of meat and nonfat or low-fat dairy products. Use olive or canola oil instead of butter or shortening when cooking. · Don't skip meals. Your blood sugar may drop too low if you skip meals and take insulin or certain medicines for diabetes. · Check with your doctor before you drink alcohol. Alcohol can cause your blood sugar to drop too low. Alcohol can also cause a bad reaction if you take certain diabetes medicines. Follow-up care is a key part of your treatment and safety. Be sure to make and go to all appointments, and call your doctor if you are having problems. It's also a good idea to know your test results and keep a list of the medicines you take. Where can you learn more? Go to http://www.[employer].com/  Enter I147 in the search box to learn more about \"Learning About Carbohydrate (Carb) Counting and Eating Out When You Have Diabetes. \"  Current as of: August 31, 2020               Content Version: 12.8  © 7190-3582 [employer]. Care instructions adapted under license by [employer] (which disclaims liability or warranty for this information). If you have questions about a medical condition or this instruction, always ask your healthcare professional. [place] [address] any warranty or liability for your use of this information.

## 2021-07-08 NOTE — LETTER
CONTROLLED SUBSTANCE MEDICATION AGREEMENT     Patient Name: Lesa Oconnor  Patient YOB: 1950     I understand, that controlled substance medications may be used to help better manage my symptoms and to improve my ability to function at home, work and in social settings. However, I also understand that these medications do have risks, which have been discussed with me, including possible development of physical or psychological dependence. I understand that successful treatment requires mutual trust and honesty between me and my provider. I understand and agree that following this Medication Agreement is necessary in continuing my provider-patient relationship and the success of my treatment plan. Explanation from my Provider: Benefits and Goals of Controlled Substance Medications: There are two potential goals for your treatment: (1) decreased pain and suffering (2) improved daily life functions. There are many possible treatments for your chronic condition(s). Alternatives such as physical therapy, yoga, massage, home daily exercise, meditation, relaxation techniques, injections, chiropractic manipulations, surgery, cognitive therapy, hypnosis and many medications that are not habit-forming may be used. Use of controlled substance medications may be helpful, but they are unlikely to resolve all symptoms or restore all function. Explanation from my Provider: Risks of Controlled Substance Medications:  Opioid pain medications: These medications can lead to problems such as addiction/dependence, sedation, lightheadedness/dizziness, memory issues, falls, constipation, nausea, or vomiting. They may also impair the ability to drive or operate machinery. Additionally, these medications may lower testosterone levels, leading to loss of bone strength, stamina and sex drive.   They may cause problems with breathing, sleep apnea and reduced coughing, which is especially dangerous for patients with lung disease. Overdose or dangerous interactions with alcohol and other medications may occur, leading to death. Hyperalgesia may develop, which means patients receiving opioids for the treatment of pain may become more sensitive to certain painful stimuli, and in some cases, experience pain from ordinarily non-painful stimuli. Women between the ages of 14-53 who could become pregnant should carefully weigh the risks and benefits of opioids with their physicians, as these medications increase the risk of pregnancy complications, including miscarriage,  delivery and stillbirth. It is also possible for babies to be born addicted to opioids. Opioid dependence withdrawal symptoms may include; feelings of uneasiness, increased pain, irritability, belly pain, diarrhea, sweats and goose-flesh. Benzodiazepines and non-benzodiazepine sleep medications: These medications can lead to problems such as addiction/dependence, sedation, fatigue, lightheadedness, dizziness, incoordination, falls, depression, hallucinations, and impaired judgment, memory and concentration. The ability to drive and operate machinery may also be affected. Abnormal sleep-related behaviors have been reported, including sleepwalking, driving, making telephone calls, eating, or having sex while not fully awake. These medications can suppress breathing and worsen sleep apnea, particularly when combined with alcohol or other sedating medications, potentially leading to death. Dependence withdrawal symptoms may include tremors, anxiety, hallucinations and seizures. Initials:_______  Stimulants:  Common adverse effects include addiction/dependence, increased blood  pressure and heart rate, decreased appetite, nausea, involuntary weight loss, insomnia,  irritability, and headaches.   These risks may increase when these medications are combined with other stimulants, such as caffeine pills or energy drinks, certain weight loss supplements and oral decongestants. Dependence withdrawal symptoms may include depressed mood, loss of interest, suicidal thoughts, anxiety, fatigue, appetite changes and agitation. Testosterone replacement therapy:  Potential side effects include increased risk of stroke and heart attack, blood clots, increased blood pressure, increased cholesterol, enlarged prostate, sleep apnea, irritability/aggression and other mood disorders, and decreased fertility. I agree and understand that I and my prescriber have the following rights and responsibilities regarding my treatment plan:     1. MY RIGHTS:  To be informed of my treatment and medication plan. To be an active participant in my health and wellbeing. 2. MY RESPONSIBILITY AND UNDERSTANDING FOR USE OF MEDICATIONS   I will take medications at the dose and frequency as directed. For my safety, I will not increase or change how I take my medications without the recommendation of my healthcare provider.  I will actively participate in any program recommended by my provider which may improve function, including social, physical, psychological programs.  I will not take my medications with alcohol or other drugs not prescribed to me. I understand that drinking alcohol with my medications increases the chances of side effects, including reduced breathing rate and could lead to personal injury when operating machinery.  I understand that if I have a history of substance use disorders, including alcohol or other illicit drugs, that I may be at increased risk of addiction to my medications.  I agree to notify my provider immediately if I should become pregnant so that my treatment plan can be adjusted.    I agree and understand that I shall only receive controlled substance medications from the prescriber that signed this agreement unless there is written agreement among other prescribers of controlled substances outlining the responsibility of the medications being prescribed.  I understand that the if the controlled medication is not helping to achieve goals, the dosage may be tapered and no longer prescribed. 3. MY RESPONSIBILITY FOR COMMUNICATION / PRESCRIPTION RENEWALS   I agree that all controlled substance medications that I take will be prescribed only by my provider. If another healthcare provider prescribes me medication in an emergency, I will notify my provider within seventy-two (72) hours.  I will arrange for refills at the prescribed interval ONLY during regular office hours. I will not ask for refills earlier than agreed, after-hours, on holidays or weekends. Refills may take up to 72 hours for processing and prescriptions to reach the pharmacy.  I will inform my other health care providers that I am taking these medications and of the existence of this [place] [id number]. In the event of an emergency, I will provide the same information to the emergency department prescribers. Initials:_______  [facility] I will keep my provider updated on the pharmacy I am using for controlled medication prescription filling. 4. MY RESPONSIBILITY FOR PROTECTING MEDICATIONS   I will protect my prescriptions and medications. I understand that lost or misplaced prescriptions will not be replaced.  I will keep medications only for my own use and will not share them with others. I will keep all medications away from children.  I agree that if my medications are adjusted or discontinued, I will properly dispose of any remaining medications. I understand that I will be required to dispose of any remaining controlled medications as, directed by my prescriber, prior to being provided with any prescriptions for other controlled medications. Medication drop box locations can be found at: [email or web address]    5.  MY RESPONSIBILITY WITH ILLEGAL DRUGS    I will not use illegal or street drugs or another person's prescription medications not prescribed to me.  If there are identified addiction type symptoms, then referral to a program may be provided by my provider and I agree to follow through with this recommendation. 6. MY RESPONSIBILITY FOR COOPERATION WITH INVESTIGATIONS   I understand that my provider will comply with any applicable law and may discuss my use and/or possible misuse/abuse of controlled substances and alcohol, as appropriate, with any health care provider involved in my care, pharmacist, or legal authority.  I authorize my provider and pharmacy to cooperate fully with law enforcement agencies (as permitted by law) in the investigation of any possible misuse, sale, or other diversion of my controlled substances.  I agree to waive any applicable privilege or right of privacy or confidentiality with respect to these authorizations. 7. PROVIDERS RIGHT TO MONITOR FOR SAFETY: PRESCRIPTION MONITORING / DRUG TESTING   I consent to drug/toxicology screening and will submit to a drug screen upon my providers request to assure I am only taking the prescribed drugs for my safety monitoring. I understand that a drug screen is a laboratory test in which a sample of my urine, blood or saliva is checked to see what drugs I have been taking. This may entail an observed urine specimen, which means that a nurse or other health care provider may watch me provide urine, and I will cooperate if I am asked to provide an observed specimen.  I understand that my provider will check a copy of my State Prescription Monitoring Program () Report in order to safely prescribe medications.  Pill Counts: I consent to pill counts when requested. I may be asked to bring all my prescribed controlled substance medications, in their original bottles, to all of my scheduled appointments. In addition, my provider may ask me to come to the practice at any time for a random pill count. Initials:_______    8. TERMINATION OF THIS AGREEMENT  For my safety, my prescriber has the right to stop prescribing controlled substance medications and may end this agreement.  Conditions that may result in termination of this agreement:  a. I do not show any improvement in pain, or my activity has not improved. b. I develop rapid tolerance or loss of improvement, as described in my treatment plan.  c. I develop significant side effects from the medication. d. My behavior is not consistent with the responsibilities outlined above, thereby causing safety concerns to continue prescribing controlled substance medications. e. I fail to follow the terms of this agreement. f. Other:____________________________       UNDERSTANDING THIS MEDICATION AGREEMENT:    I have read the above and have had all my questions answered. For chronic disease management, I know that my symptoms can be managed with many types of treatments. A chronic medication trial may be part of my treatment, but I must be an active participant in my care. Medication therapy is only one part of my symptom management plan. In some cases, there may be limited scientific evidence to support the chronic use of certain medications to improve symptoms and daily function. Furthermore, in certain circumstances, there may be scientific information that suggests that the use of chronic controlled substances may worsen my symptoms and increase my risk of unintentional death directly related to this medication therapy. I know that if my provider feels my risk from controlled medications is greater than my benefit, I will have my controlled substance medication(s) compassionately lowered or removed altogether. I further agree to allow this office to contact my HIPAA contact if there are concerns about my safety and use of the controlled medications.    I have agreed to use the prescribed controlled substance medications to me as instructed by my provider and as stated in this Medication Agreement. My initial on each page and my signature below shows that I have read each page and I have had the opportunity to ask questions with answers provided by my provider.     Patient Name (Printed): _____________________________________  Patient Signature:  ______________________   Date: _____________    Prescriber Name (Printed): ___________________________________  Prescriber Signature: _____________________  Date: _____________

## 2021-07-08 NOTE — PROGRESS NOTES
Chief Complaint   Patient presents with    Follow Up Chronic Condition     3 mo fu    Annual Wellness Visit       Depression Risk Factor Screening:     3 most recent PHQ Screens 7/8/2021   Little interest or pleasure in doing things Not at all   Feeling down, depressed, irritable, or hopeless Not at all   Total Score PHQ 2 0   Trouble falling or staying asleep, or sleeping too much Not at all   Feeling tired or having little energy Not at all   Poor appetite, weight loss, or overeating Not at all   Feeling bad about yourself - or that you are a failure or have let yourself or your family down Not at all   Trouble concentrating on things such as school, work, reading, or watching TV Not at all   Moving or speaking so slowly that other people could have noticed; or the opposite being so fidgety that others notice Not at all   Thoughts of being better off dead, or hurting yourself in some way Not at all   PHQ 9 Score 0   How difficult have these problems made it for you to do your work, take care of your home and get along with others Not difficult at all       Functional Ability and Level of Safety:     Activities of Daily Living  ADL Assessment 7/8/2021   Feeding yourself No Help Needed   Getting from bed to chair No Help Needed   Getting dressed No Help Needed   Bathing or showering No Help Needed   Walk across the room (includes cane/walker) No Help Needed   Using the telphone No Help Needed   Taking your medications No Help Needed   Preparing meals No Help Needed   Managing money (expenses/bills) No Help Needed   Moderately strenuous housework (laundry) Help Needed   Shopping for personal items (toiletries/medicines) Help Needed   Shopping for groceries Help Needed   Driving Help Needed   Climbing a flight of stairs Help Needed   Getting to places beyond walking distances Help Needed       Fall Risk  Fall Risk Assessment, last 12 mths 7/8/2021   Able to walk?  Yes   Fall in past 12 months? 0   Do you feel unsteady? 0   Are you worried about falling 0   Number of falls in past 12 months -   Fall with injury? -       Abuse Screen  Abuse Screening Questionnaire 7/8/2021   Do you ever feel afraid of your partner? N   Are you in a relationship with someone who physically or mentally threatens you? N   Is it safe for you to go home?  Y         Patient Care Team   Patient Care Team:  Jaclyn Ojeda MD as PCP - General (Internal Medicine)  Jaclyn Ojeda MD as PCP - [address] [place] [address] Provider  Rolan Sage MD (Nephrology)  Sher Souza MD (Cardiology)  Yovana King MD (Female Pelvic Medicine and Reconstructive Surgery)  Retta Sandifer, MD (Pulmonary Disease)

## 2021-07-09 LAB
ALBUMIN SERPL-MCNC: 3.6 G/DL (ref 3.5–5)
ALBUMIN/GLOB SERPL: 1.2 {RATIO} (ref 1.1–2.2)
ALP SERPL-CCNC: 132 U/L (ref 45–117)
ALT SERPL-CCNC: 22 U/L (ref 12–78)
ANION GAP SERPL CALC-SCNC: 8 MMOL/L (ref 5–15)
APPEARANCE UR: CLEAR
AST SERPL-CCNC: 16 U/L (ref 15–37)
BACTERIA URNS QL MICRO: NEGATIVE /HPF
BASOPHILS # BLD: 0 K/UL (ref 0–0.1)
BASOPHILS NFR BLD: 0 % (ref 0–1)
BILIRUB SERPL-MCNC: 0.5 MG/DL (ref 0.2–1)
BILIRUB UR QL: NEGATIVE
BUN SERPL-MCNC: 24 MG/DL (ref 6–20)
BUN/CREAT SERPL: 14 (ref 12–20)
CALCIUM SERPL-MCNC: 9.5 MG/DL (ref 8.5–10.1)
CHLORIDE SERPL-SCNC: 107 MMOL/L (ref 97–108)
CHOLEST SERPL-MCNC: 114 MG/DL
CK SERPL-CCNC: 47 U/L (ref 26–192)
CO2 SERPL-SCNC: 33 MMOL/L (ref 21–32)
COLOR UR: NORMAL
CREAT SERPL-MCNC: 1.71 MG/DL (ref 0.55–1.02)
CREAT UR-MCNC: <13 MG/DL
DIFFERENTIAL METHOD BLD: ABNORMAL
EOSINOPHIL # BLD: 0.4 K/UL (ref 0–0.4)
EOSINOPHIL NFR BLD: 5 % (ref 0–7)
EPITH CASTS URNS QL MICRO: NORMAL /LPF
ERYTHROCYTE [DISTWIDTH] IN BLOOD BY AUTOMATED COUNT: 17.1 % (ref 11.5–14.5)
EST. AVERAGE GLUCOSE BLD GHB EST-MCNC: 137 MG/DL
GLOBULIN SER CALC-MCNC: 3 G/DL (ref 2–4)
GLUCOSE SERPL-MCNC: 209 MG/DL (ref 65–100)
GLUCOSE UR STRIP.AUTO-MCNC: NEGATIVE MG/DL
HBA1C MFR BLD: 6.4 % (ref 4–5.6)
HCT VFR BLD AUTO: 39.5 % (ref 35–47)
HDLC SERPL-MCNC: 39 MG/DL
HDLC SERPL: 2.9 {RATIO} (ref 0–5)
HGB BLD-MCNC: 11.9 G/DL (ref 11.5–16)
HGB UR QL STRIP: NEGATIVE
HYALINE CASTS URNS QL MICRO: NORMAL /LPF (ref 0–5)
IMM GRANULOCYTES # BLD AUTO: 0 K/UL (ref 0–0.04)
IMM GRANULOCYTES NFR BLD AUTO: 1 % (ref 0–0.5)
KETONES UR QL STRIP.AUTO: NEGATIVE MG/DL
LDLC SERPL CALC-MCNC: 34.4 MG/DL (ref 0–100)
LEUKOCYTE ESTERASE UR QL STRIP.AUTO: NEGATIVE
LYMPHOCYTES # BLD: 1 K/UL (ref 0.8–3.5)
LYMPHOCYTES NFR BLD: 15 % (ref 12–49)
MCH RBC QN AUTO: 27.6 PG (ref 26–34)
MCHC RBC AUTO-ENTMCNC: 30.1 G/DL (ref 30–36.5)
MCV RBC AUTO: 91.6 FL (ref 80–99)
MICROALBUMIN UR-MCNC: <0.5 MG/DL
MICROALBUMIN/CREAT UR-RTO: NORMAL MG/G (ref 0–30)
MONOCYTES # BLD: 0.4 K/UL (ref 0–1)
MONOCYTES NFR BLD: 6 % (ref 5–13)
NEUTS SEG # BLD: 5 K/UL (ref 1.8–8)
NEUTS SEG NFR BLD: 73 % (ref 32–75)
NITRITE UR QL STRIP.AUTO: NEGATIVE
NRBC # BLD: 0 K/UL (ref 0–0.01)
NRBC BLD-RTO: 0 PER 100 WBC
PH UR STRIP: 7 [PH] (ref 5–8)
PLATELET # BLD AUTO: 187 K/UL (ref 150–400)
PMV BLD AUTO: 12.7 FL (ref 8.9–12.9)
POTASSIUM SERPL-SCNC: 3.9 MMOL/L (ref 3.5–5.1)
PROT SERPL-MCNC: 6.6 G/DL (ref 6.4–8.2)
PROT UR STRIP-MCNC: NEGATIVE MG/DL
RBC # BLD AUTO: 4.31 M/UL (ref 3.8–5.2)
RBC #/AREA URNS HPF: NORMAL /HPF (ref 0–5)
SODIUM SERPL-SCNC: 148 MMOL/L (ref 136–145)
SP GR UR REFRACTOMETRY: 1.01 (ref 1–1.03)
TRIGL SERPL-MCNC: 203 MG/DL (ref ?–150)
TSH SERPL DL<=0.05 MIU/L-ACNC: 1.42 UIU/ML (ref 0.36–3.74)
UA: UC IF INDICATED,UAUC: NORMAL
UROBILINOGEN UR QL STRIP.AUTO: 0.2 EU/DL (ref 0.2–1)
VLDLC SERPL CALC-MCNC: 40.6 MG/DL
WBC # BLD AUTO: 6.8 K/UL (ref 3.6–11)
WBC URNS QL MICRO: NORMAL /HPF (ref 0–4)

## 2021-07-10 DIAGNOSIS — F41.9 ANXIETY: ICD-10-CM

## 2021-07-11 RX ORDER — CLONAZEPAM 0.5 MG/1
TABLET ORAL
Qty: 90 TABLET | Refills: 0 | Status: SHIPPED | OUTPATIENT
Start: 2021-07-11 | End: 2021-10-12

## 2021-07-15 ENCOUNTER — TELEPHONE (OUTPATIENT)
Dept: INTERNAL MEDICINE CLINIC | Age: 71
End: 2021-07-15

## 2021-07-15 RX ORDER — POTASSIUM CHLORIDE 750 MG/1
10 TABLET, FILM COATED, EXTENDED RELEASE ORAL 4 TIMES DAILY
Qty: 360 TABLET | Refills: 0 | Status: ON HOLD | OUTPATIENT
Start: 2021-07-15 | End: 2021-10-06

## 2021-07-15 NOTE — TELEPHONE ENCOUNTER
Abbey Chapa states patient burns when urinating. She is aware that she was seen here this week. Please advise.      [phone] Abbey Chapa

## 2021-07-26 ENCOUNTER — TELEPHONE (OUTPATIENT)
Dept: INTERNAL MEDICINE CLINIC | Age: 71
End: 2021-07-26

## 2021-07-26 NOTE — TELEPHONE ENCOUNTER
Patient would like to know if you will write a letter. She received a letter stating she may have jury duty from 9/13 -11/5. She is unable to because of health issues. Please call.      [phone]

## 2021-08-04 RX ORDER — TRIAMCINOLONE ACETONIDE 1 MG/G
CREAM TOPICAL
Qty: 15 G | Refills: 3 | Status: SHIPPED | OUTPATIENT
Start: 2021-08-04 | End: 2021-12-20

## 2021-08-16 DIAGNOSIS — S81.802A OPEN WOUND OF LEFT LOWER LEG, INITIAL ENCOUNTER: ICD-10-CM

## 2021-08-16 NOTE — TELEPHONE ENCOUNTER
Last Refill: 7/8/21  Last Visit: 7/8/2021   Next Visit: 1/12/2022    Requested Prescriptions     Pending Prescriptions Disp Refills    mupirocin (BACTROBAN) 2 % ointment 30 g 2     Sig: Apply  to affected area daily.

## 2021-08-17 RX ORDER — MUPIROCIN 20 MG/G
OINTMENT TOPICAL DAILY
Qty: 30 G | Refills: 2 | Status: SHIPPED | OUTPATIENT
Start: 2021-08-17 | End: 2022-07-22

## 2021-08-17 RX ORDER — NYSTATIN 100000 [USP'U]/G
POWDER TOPICAL 4 TIMES DAILY
Qty: 1 BOTTLE | Refills: 1 | Status: SHIPPED | OUTPATIENT
Start: 2021-08-17 | End: 2022-02-07

## 2021-08-17 NOTE — TELEPHONE ENCOUNTER
Patient would like Nystatin powder to use under breasts:  Requested Prescriptions     Pending Prescriptions Disp Refills    nystatin (MYCOSTATIN) powder 1 Bottle 1     Sig: Apply  to affected area four (4) times daily.

## 2021-08-30 ENCOUNTER — TELEPHONE (OUTPATIENT)
Dept: INTERNAL MEDICINE CLINIC | Age: 71
End: 2021-08-30

## 2021-08-30 NOTE — TELEPHONE ENCOUNTER
Patient's  states she got out of the hospital last Thursday for 5 fx ribs. He states the paperwork didn't say to follow up with you but [facility] [place] operated by [facility] told him he does need to. Also they prescribed oxycodone and he said that is to strong for her. Can you prescribe something else for her? Please advise.      [phone]

## 2021-08-31 ENCOUNTER — OFFICE VISIT (OUTPATIENT)
Dept: INTERNAL MEDICINE CLINIC | Age: 71
End: 2021-08-31
Payer: MEDICARE

## 2021-08-31 VITALS
WEIGHT: 254 LBS | HEIGHT: 69 IN | TEMPERATURE: 97.8 F | RESPIRATION RATE: 16 BRPM | HEART RATE: 81 BPM | DIASTOLIC BLOOD PRESSURE: 78 MMHG | SYSTOLIC BLOOD PRESSURE: 128 MMHG | OXYGEN SATURATION: 94 % | BODY MASS INDEX: 37.62 KG/M2

## 2021-08-31 DIAGNOSIS — I25.5 ISCHEMIC CARDIOMYOPATHY: Chronic | ICD-10-CM

## 2021-08-31 DIAGNOSIS — S22.41XD CLOSED FRACTURE OF MULTIPLE RIBS OF RIGHT SIDE WITH ROUTINE HEALING: Primary | ICD-10-CM

## 2021-08-31 DIAGNOSIS — J45.20 MILD INTERMITTENT ASTHMA WITHOUT COMPLICATION: ICD-10-CM

## 2021-08-31 DIAGNOSIS — I50.22 SYSTOLIC CHF, CHRONIC (HCC): Chronic | ICD-10-CM

## 2021-08-31 PROCEDURE — G8427 DOCREV CUR MEDS BY ELIG CLIN: HCPCS | Performed by: INTERNAL MEDICINE

## 2021-08-31 PROCEDURE — 99215 OFFICE O/P EST HI 40 MIN: CPT | Performed by: INTERNAL MEDICINE

## 2021-08-31 RX ORDER — TRAMADOL HYDROCHLORIDE 50 MG/1
50 TABLET ORAL
Qty: 60 TABLET | Refills: 0 | Status: SHIPPED | OUTPATIENT
Start: 2021-08-31 | End: 2021-09-14 | Stop reason: SDUPTHER

## 2021-08-31 NOTE — PROGRESS NOTES
Subjective:     Mrs. Kim Gregory returns for office today and transition of care subsequent to a hospitalization at [address] [place] [address] from 8/22 until 8/26 when she was hospitalized with multiple right-sided rib fractures as a result of a fall at home. The patient states that 2 days prior to her admission she tripped on a rug in her dining room and hit the wall of an arch that  the dining room and living room. She hit the corner of the base of the arch and broke 5 ribs. Patient was able to manage at home for 2 days until she woke up on the day of admission and was unable to get out of bed. The patient was admitted and hospitalized and given breathing treatments and her CPAP. She did have some problems with oxygenation. She was seen by physical therapy and ambulated. She was subsequently discharged home on oxycodone along with home health services. The patient notes that she is able to get up and walk. She is having difficulty tolerating the oxycodone due to severe problems with dreaming and inability to sleep. She would like to change to an alternative medication for pain management. She denies any cough, shortness of breath or fever. She does have a history of heart failure with an ischemic cardiomyopathy but denies PND, orthopnea. She does have chronic lower extremity edema but this is not worsened.     Past Medical History:   Diagnosis Date    Anxiety 1/22/2018    Arthritis     OA    Asthma     Diabetes ([address]     Essential hypertension     GERD (gastroesophageal reflux disease)     Hypercholesterolemia 1/22/2018    Hypertension     Ill-defined condition     diverticulitis    Long-term use of high-risk medication 1/22/2018    Neuropathy     Obesity (BMI 30-39.9) 4/30/2019    Other ill-defined conditions(799.89)     IBS, spinal stenosis    Plantar fasciitis     Psychiatric disorder     depression, anxiety    Sleep apnea     uses CPAP    Type 2 diabetes mellitus with diabetic neuropathy, without long-term current use of insulin ([facility] [address] 6/5/2016     Past Surgical History:   Procedure Laterality Date    COLONOSCOPY N/A 3/14/2018    COLONOSCOPY performed by Harman Esquivel [facility] at [facility] ENDOSCOPY    HX APPENDECTOMY      HX CHOLECYSTECTOMY  11/15/2018    HX HYSTERECTOMY      HX PACEMAKER      IR KYPHOPLASTY LUMBAR  12/22/2020    IR KYPHOPLASTY THORACIC  1/6/2021    [place] CARDIAC SURG PROCEDURE UNLIST      stent     Allergies   Allergen Reactions    Sulfa (Sulfonamide Antibiotics) Swelling    Amoxicillin Swelling     Current Outpatient Medications   Medication Sig Dispense Refill    mupirocin (BACTROBAN) 2 % ointment Apply  to affected area daily. 30 g 2    nystatin (MYCOSTATIN) powder Apply  to affected area four (4) times daily. 1 Bottle 1    triamcinolone acetonide (KENALOG) 0.1 % topical cream APPLY A THIN FILM OF CREAM EXTERNALLY TO AFFECTED AREA TWICE DAILY 15 g 3    potassium chloride SR (KLOR-CON 10) 10 mEq tablet Take 1 Tablet by mouth four (4) times daily. 360 Tablet 0    clonazePAM (KlonoPIN) 0.5 mg tablet TAKE 1 TABLET BY MOUTH NIGHTLY . DO NOT EXCEED  1  PER  DAY 90 Tablet 0    cyclobenzaprine (FLEXERIL) 10 mg tablet TAKE 1 TABLET BY MOUTH THREE TIMES DAILY AS NEEDED FOR MUSCLE SPASM 90 Tablet 0    omeprazole (PRILOSEC) 40 mg capsule TAKE 1 CAPSULE EVERY DAY 90 Capsule 1    clotrimazole-betamethasone (LOTRISONE) topical cream Apply  to affected area two (2) times a day.  L.acidoph & parac-S.therm-Bifido (JONNIE Q2/RISAQUAD-2) 16 billion cell cap cap Take 1 Capsule by mouth daily. 10 Capsule 0    pregabalin (LYRICA) 300 mg capsule Take 1 Capsule by mouth two (2) times a day. Max Daily Amount: 600 mg. 1 Capsule 0    hydrALAZINE (APRESOLINE) 25 mg tablet Take 75 mg by mouth three (3) times daily. Indications: high blood pressure      carvediloL (COREG) 25 mg tablet Take 25 mg by mouth two (2) times daily (with meals).       pantoprazole (PROTONIX) 40 mg tablet Take 1 Tab by mouth daily. 30 Tab 0    torsemide (DEMADEX) 20 mg tablet Take 1 Tab by mouth daily. 90 Tab 2    ferrous sulfate 325 mg (65 mg iron) tablet Take 1 Tab by mouth every other day. 90 Tab 2    eucalyptus-peppermint oil (Ponaris) soln 1-2 Drops by Nasal route nightly as needed.  isosorbide mononitrate ER (IMDUR) 60 mg CR tablet Take 1 Tab by mouth daily. 90 Tab 3    magnesium oxide (MAG-OX) 400 mg tablet Take 1 tablet by mouth twice daily (Patient taking differently: daily. ) 180 Tab 3    SYMBICORT 160-4.5 mcg/actuation HFAA INHALE 2 PUFFS BY MOUTH TWICE DAILY 1 Inhaler 3    albuterol (PROVENTIL HFA, VENTOLIN HFA, PROAIR HFA) 90 mcg/actuation inhaler Take 1 Puff by inhalation every four (4) hours as needed for Wheezing. (Patient taking differently: Take 1 Puff by inhalation two (2) times daily as needed for Wheezing.) 1 Inhaler 6    dabigatran etexilate (PRADAXA) 150 mg capsule Take 1 Cap by mouth two (2) times a day. IF NO BLEEDING AT CATH SITE. 60 Cap 12    sodium chloride (AYR SALINE) 0.65 % nasal squeeze bottle 2 Sprays by Both Nostrils route every eight (8) hours as needed.  conjugated estrogens (PREMARIN) 0.625 mg/gram vaginal cream Insert 0.5 g into vagina two (2) times a week. Using Daily      venlafaxine-SR (EFFEXOR XR) 150 mg capsule Take 150 mg by mouth two (2) times daily (with meals).  cholecalciferol (Vitamin D3) 25 mcg (1,000 unit) cap Take 1,000 Units by mouth daily.  aspirin 81 mg chewable tablet Take 81 mg by mouth daily.  atorvastatin (LIPITOR) 40 mg tablet Take 1 Tab by mouth nightly.  27 Tab 12     Social History     Socioeconomic History    Marital status:      Spouse name: Not on file    Number of children: Not on file    Years of education: Not on file    Highest education level: Not on file   Tobacco Use    Smoking status: Never Smoker    Smokeless tobacco: Never Used   Vaping Use    Vaping Use: Never used   Substance and Sexual Activity    Alcohol use: No    Drug use: No     Social Determinants of Health     Financial Resource Strain:     Difficulty of Paying Living Expenses:    Food Insecurity:     Worried About Running Out of Food in the Last Year:     [address] in the Last Year:    Transportation Needs:     Lack of Transportation (Medical):  Lack of Transportation (Non-Medical):    Physical Activity:     Days of Exercise per Week:     Minutes of Exercise per Session:    Stress:     Feeling of Stress :    Social Connections:     Frequency of Communication with Friends and Family:     Frequency of Social Gatherings with Friends and Family:     Attends Synagogue Services:     Active Member of Clubs or Organizations:     Attends Club or Organization Meetings:     Marital Status:      Family History   Problem Relation Age of Onset   [facility] Arthritis-osteo Mother     Hypertension Mother     High Cholesterol Mother     Crohn's Disease Mother     Heart Disease Mother     Alcohol abuse Father     High Cholesterol Sister     Hypertension Sister     Thyroid Disease Sister     COPD Sister     High Cholesterol Brother     Hypertension Brother     COPD Brother     COPD Child     Inflammatory Bowel Dz Child        Review of Systems:  GEN: no weight loss, weight gain, fatigue or night sweats  CV: no PND, orthopnea, or palpitations  Resp: no dyspnea on exertion, no cough. Positive for right-sided chest pain  Abd: no nausea, vomiting or diarrhea  EXT: Positive for chronic lower extremity edema  Endocrine: no hair loss, excessive thirst or polyuria  Neurological ROS: no TIA or stroke symptoms  ROS otherwise negative      Objective:     Visit Vitals  /78 (BP 1 Location: Left upper arm, BP Patient Position: Sitting, BP Cuff Size: Adult)   Pulse 81   Temp 97.8 °F (36.6 °C) (Oral)   Resp 16   Ht 5' 9\" (1.753 m)   Wt 254 lb (115.2 kg)   SpO2 94%   BMI 37.51 kg/m²     Body mass index is 37.51 kg/m².     General:   alert, cooperative and no distress   Eyes: conjunctivae/sclerae clear. PERRL, EOM's intact   Mouth:  No oral lesions, no pharyngeal erythema, no exudates   Neck: Trachea midline, no thyromegaly, no bruits   Heart: S1 and S2 normal,no murmurs noted    Lungs: Clear to auscultation bilaterally, no increased work of breathing   Abdomen: Soft, nontender. Normal bowel sounds   Extremities:  Chronic 1+ edema present   Neuro: ..alert, oriented x3,speech normal in context and clarity, cranial nerves II-XII intact,motor strength: full proximally and distally,gait: normal  reflexes: full and symmetric     Physical exam otherwise negative         Assessment/Plan:     Diagnoses and all orders for this visit:    Closed fracture of multiple ribs of right side with routine healing    Mild intermittent asthma without complication    Systolic CHF, chronic (HCC)    Ischemic cardiomyopathy        Other instructions:   Patient's medications were reviewed and reconciled. The patient is having side effects related to her oxycodone. She does have an old prescription of tramadol at home and we will have her switch to this taking 50 mg up to 4 times daily for pain relief. Have recommended that she try to decrease the number of times that she is taking the medication after she has been on the medication for at least 2 weeks for pain management. Patient already has a controlled substance agreement in place    Continued home health nursing and physical therapy    Follow-up here as previously appointed    Follow-up and Dispositions    · Return for As previously scheduled. Jostin Oliveira MD    Please note that this dictation was completed with [employer], the computer voice recognition software. Quite often unanticipated grammatical, syntax, homophones, and other interpretive errors are inadvertently transcribed by the computer software. Please disregard these errors. Please excuse any errors that have escaped final proofreading.

## 2021-08-31 NOTE — PROGRESS NOTES
Mihaela Quiñones is a 78 y.o. female presenting for Transitions Of Care  . 1. Have you been to the ER, urgent care clinic since your last visit? Hospitalized since your last visit? 8/22/21-8/26/21 [facility]    2. Have you seen or consulted any other health care providers outside of the [address] [place] [address] since your last visit? Include any pap smears or colon screening. Cardiologist    Fall Risk Assessment, last 12 mths 7/8/2021   Able to walk? Yes   Fall in past 12 months? 0   Do you feel unsteady? 0   Are you worried about falling 0   Number of falls in past 12 months -   Fall with injury? -         Abuse Screening Questionnaire 7/8/2021   Do you ever feel afraid of your partner? N   Are you in a relationship with someone who physically or mentally threatens you? N   Is it safe for you to go home? Y       3 most recent PHQ Screens 7/8/2021   Little interest or pleasure in doing things Not at all   Feeling down, depressed, irritable, or hopeless Not at all   Total Score PHQ 2 0   Trouble falling or staying asleep, or sleeping too much Not at all   Feeling tired or having little energy Not at all   Poor appetite, weight loss, or overeating Not at all   Feeling bad about yourself - or that you are a failure or have let yourself or your family down Not at all   Trouble concentrating on things such as school, work, reading, or watching TV Not at all   Moving or speaking so slowly that other people could have noticed; or the opposite being so fidgety that others notice Not at all   Thoughts of being better off dead, or hurting yourself in some way Not at all   PHQ 9 Score 0   How difficult have these problems made it for you to do your work, take care of your home and get along with others Not difficult at all       There are no discontinued medications.

## 2021-08-31 NOTE — TELEPHONE ENCOUNTER
Patient only had 2 Tramadol at home. Needs refill:  Requested Prescriptions     Pending Prescriptions Disp Refills    traMADoL (ULTRAM) 50 mg tablet 60 Tablet 0     Sig: Take 1 Tablet by mouth four (4) times daily as needed for Pain for up to 30 days. Max Daily Amount: 200 mg.

## 2021-09-14 ENCOUNTER — TELEPHONE (OUTPATIENT)
Dept: INTERNAL MEDICINE CLINIC | Age: 71
End: 2021-09-14

## 2021-09-14 DIAGNOSIS — S22.41XD CLOSED FRACTURE OF MULTIPLE RIBS OF RIGHT SIDE WITH ROUTINE HEALING: ICD-10-CM

## 2021-09-14 RX ORDER — CYCLOBENZAPRINE HCL 10 MG
TABLET ORAL
Qty: 90 TABLET | Refills: 0 | Status: SHIPPED | OUTPATIENT
Start: 2021-09-14 | End: 2022-07-22

## 2021-09-14 RX ORDER — TRAMADOL HYDROCHLORIDE 50 MG/1
50 TABLET ORAL
Qty: 60 TABLET | Refills: 0 | Status: SHIPPED | OUTPATIENT
Start: 2021-09-14 | End: 2021-10-14

## 2021-09-14 NOTE — TELEPHONE ENCOUNTER
Requested Prescriptions     Pending Prescriptions Disp Refills    traMADoL (ULTRAM) 50 mg tablet 60 Tablet 0     Sig: Take 1 Tablet by mouth four (4) times daily as needed for Pain for up to 30 days. Max Daily Amount: 200 mg.     cyclobenzaprine (FLEXERIL) 10 mg tablet 90 Tablet 0     Sig: TAKE 1 TABLET BY MOUTH THREE TIMES DAILY AS NEEDED FOR MUSCLE SPASM       Last Refill: 6/1/21  Next Appointment:1/12/22

## 2021-09-22 RX ORDER — OMEPRAZOLE 40 MG/1
CAPSULE, DELAYED RELEASE ORAL
Qty: 90 CAPSULE | Refills: 1 | Status: SHIPPED | OUTPATIENT
Start: 2021-09-22 | End: 2022-01-31

## 2021-09-22 NOTE — TELEPHONE ENCOUNTER
Requested Prescriptions     Pending Prescriptions Disp Refills    omeprazole (PRILOSEC) 40 mg capsule 90 Capsule 1     Si Capsule daily.        Last Refill: 21  Next Appointment:22

## 2021-09-30 ENCOUNTER — HOSPITAL ENCOUNTER (OUTPATIENT)
Dept: GENERAL RADIOLOGY | Age: 71
Discharge: HOME | End: 2021-09-30
Attending: INTERNAL MEDICINE
Payer: MEDICARE

## 2021-09-30 ENCOUNTER — HOSPITAL ENCOUNTER (OUTPATIENT)
Dept: PREADMISSION TESTING | Age: 71
Discharge: HOME | End: 2021-09-30
Attending: INTERNAL MEDICINE
Payer: MEDICARE

## 2021-09-30 VITALS
BODY MASS INDEX: 39.55 KG/M2 | OXYGEN SATURATION: 96 % | HEART RATE: 80 BPM | RESPIRATION RATE: 18 BRPM | DIASTOLIC BLOOD PRESSURE: 70 MMHG | SYSTOLIC BLOOD PRESSURE: 134 MMHG | TEMPERATURE: 98.2 F | WEIGHT: 251.99 LBS | HEIGHT: 67 IN

## 2021-09-30 LAB
ANION GAP SERPL CALC-SCNC: 6 MMOL/L (ref 5–15)
BUN SERPL-MCNC: 28 MG/DL (ref 6–20)
BUN/CREAT SERPL: 17 (ref 12–20)
CALCIUM SERPL-MCNC: 9.3 MG/DL (ref 8.5–10.1)
CHLORIDE SERPL-SCNC: 110 MMOL/L (ref 97–108)
CO2 SERPL-SCNC: 26 MMOL/L (ref 21–32)
CREAT SERPL-MCNC: 1.61 MG/DL (ref 0.55–1.02)
ERYTHROCYTE [DISTWIDTH] IN BLOOD BY AUTOMATED COUNT: 17.2 % (ref 11.5–14.5)
GLUCOSE SERPL-MCNC: 156 MG/DL (ref 65–100)
HCT VFR BLD AUTO: 41.5 % (ref 35–47)
HGB BLD-MCNC: 13 G/DL (ref 11.5–16)
MCH RBC QN AUTO: 28.8 PG (ref 26–34)
MCHC RBC AUTO-ENTMCNC: 31.3 G/DL (ref 30–36.5)
MCV RBC AUTO: 92 FL (ref 80–99)
NRBC # BLD: 0 K/UL (ref 0–0.01)
NRBC BLD-RTO: 0 PER 100 WBC
PLATELET # BLD AUTO: 198 K/UL (ref 150–400)
PMV BLD AUTO: 11.5 FL (ref 8.9–12.9)
POTASSIUM SERPL-SCNC: 3.7 MMOL/L (ref 3.5–5.1)
RBC # BLD AUTO: 4.51 M/UL (ref 3.8–5.2)
SODIUM SERPL-SCNC: 142 MMOL/L (ref 136–145)
WBC # BLD AUTO: 8 K/UL (ref 3.6–11)

## 2021-09-30 PROCEDURE — 36415 COLL VENOUS BLD VENIPUNCTURE: CPT

## 2021-09-30 PROCEDURE — 85027 COMPLETE CBC AUTOMATED: CPT

## 2021-09-30 PROCEDURE — 80048 BASIC METABOLIC PNL TOTAL CA: CPT

## 2021-09-30 PROCEDURE — 71046 X-RAY EXAM CHEST 2 VIEWS: CPT

## 2021-09-30 RX ORDER — NITROFURANTOIN (MACROCRYSTALS) 100 MG/1
100 CAPSULE ORAL 2 TIMES DAILY
Status: ON HOLD | COMMUNITY
End: 2021-10-06 | Stop reason: ALTCHOICE

## 2021-10-01 ENCOUNTER — HOSPITAL ENCOUNTER (OUTPATIENT)
Dept: PREADMISSION TESTING | Age: 71
Discharge: HOME | End: 2021-10-01

## 2021-10-01 ENCOUNTER — HOSPITAL ENCOUNTER (OUTPATIENT)
Dept: PREADMISSION TESTING | Age: 71
Discharge: HOME | End: 2021-10-01
Attending: INTERNAL MEDICINE
Payer: MEDICARE

## 2021-10-01 LAB
APPEARANCE UR: CLEAR
BACTERIA SPEC CULT: ABNORMAL
BACTERIA SPEC CULT: ABNORMAL
BACTERIA URNS QL MICRO: NEGATIVE /HPF
BILIRUB UR QL: NEGATIVE
COLOR UR: ABNORMAL
EPITH CASTS URNS QL MICRO: ABNORMAL /LPF
GLUCOSE UR STRIP.AUTO-MCNC: NEGATIVE MG/DL
HGB UR QL STRIP: ABNORMAL
KETONES UR QL STRIP.AUTO: NEGATIVE MG/DL
LEUKOCYTE ESTERASE UR QL STRIP.AUTO: ABNORMAL
NITRITE UR QL STRIP.AUTO: NEGATIVE
PH UR STRIP: 6.5 [PH] (ref 5–8)
PROT UR STRIP-MCNC: NEGATIVE MG/DL
RBC #/AREA URNS HPF: ABNORMAL /HPF (ref 0–5)
SERVICE CMNT-IMP: ABNORMAL
SP GR UR REFRACTOMETRY: 1.01 (ref 1–1.03)
UA: UC IF INDICATED,UAUC: ABNORMAL
UROBILINOGEN UR QL STRIP.AUTO: 0.2 EU/DL (ref 0.2–1)
WBC URNS QL MICRO: ABNORMAL /HPF (ref 0–4)

## 2021-10-01 PROCEDURE — U0005 INFEC AGEN DETEC AMPLI PROBE: HCPCS

## 2021-10-01 PROCEDURE — 81001 URINALYSIS AUTO W/SCOPE: CPT

## 2021-10-01 NOTE — PERIOP NOTES
Called prescription for Mupirocin ointment to both nares BID x 5 days. Left a message for the patient to return my call.

## 2021-10-02 LAB
SARS-COV-2, XPLCVT: NOT DETECTED
SOURCE, COVRS: NORMAL

## 2021-10-06 ENCOUNTER — HOSPITAL ENCOUNTER (INPATIENT)
Age: 71
LOS: 1 days | Discharge: HOME | DRG: 274 | End: 2021-10-06
Attending: INTERNAL MEDICINE | Admitting: INTERNAL MEDICINE
Payer: MEDICARE

## 2021-10-06 ENCOUNTER — APPOINTMENT (OUTPATIENT)
Dept: CARDIAC CATH/INVASIVE PROCEDURES | Age: 71
DRG: 274 | End: 2021-10-06
Attending: INTERNAL MEDICINE
Payer: MEDICARE

## 2021-10-06 ENCOUNTER — APPOINTMENT (OUTPATIENT)
Dept: NON INVASIVE DIAGNOSTICS | Age: 71
DRG: 274 | End: 2021-10-06
Attending: INTERNAL MEDICINE
Payer: MEDICARE

## 2021-10-06 ENCOUNTER — ANESTHESIA (OUTPATIENT)
Dept: CARDIAC CATH/INVASIVE PROCEDURES | Age: 71
DRG: 274 | End: 2021-10-06
Payer: MEDICARE

## 2021-10-06 ENCOUNTER — ANESTHESIA EVENT (OUTPATIENT)
Dept: CARDIAC CATH/INVASIVE PROCEDURES | Age: 71
DRG: 274 | End: 2021-10-06
Payer: MEDICARE

## 2021-10-06 VITALS
HEIGHT: 67 IN | DIASTOLIC BLOOD PRESSURE: 73 MMHG | SYSTOLIC BLOOD PRESSURE: 133 MMHG | HEART RATE: 81 BPM | BODY MASS INDEX: 39.86 KG/M2 | RESPIRATION RATE: 20 BRPM | WEIGHT: 253.97 LBS | TEMPERATURE: 98.1 F | OXYGEN SATURATION: 96 %

## 2021-10-06 DIAGNOSIS — I48.91 ATRIAL FIBRILLATION, UNSPECIFIED TYPE (HCC): ICD-10-CM

## 2021-10-06 PROBLEM — Z95.818 PRESENCE OF WATCHMAN LEFT ATRIAL APPENDAGE CLOSURE DEVICE: Status: ACTIVE | Noted: 2021-10-06

## 2021-10-06 LAB
ANION GAP SERPL CALC-SCNC: 4 MMOL/L (ref 5–15)
BUN SERPL-MCNC: 31 MG/DL (ref 6–20)
BUN/CREAT SERPL: 18 (ref 12–20)
CALCIUM SERPL-MCNC: 8.6 MG/DL (ref 8.5–10.1)
CHLORIDE SERPL-SCNC: 110 MMOL/L (ref 97–108)
CO2 SERPL-SCNC: 28 MMOL/L (ref 21–32)
CREAT SERPL-MCNC: 1.7 MG/DL (ref 0.55–1.02)
ECHO LA MAJOR AXIS: 3.63 CM
ECHO LA MINOR AXIS: 1.62 CM
ECHO LV INTERNAL DIMENSION DIASTOLIC: 4.98 CM (ref 3.9–5.3)
ECHO LV INTERNAL DIMENSION SYSTOLIC: 3.44 CM
ECHO LV IVSD: 1.62 CM (ref 0.6–0.9)
ECHO LV IVSS: 1.91 CM
ECHO LV MASS 2D: 373.6 G (ref 67–162)
ECHO LV MASS INDEX 2D: 166.8 G/M2 (ref 43–95)
ECHO LV POSTERIOR WALL DIASTOLIC: 1.7 CM (ref 0.6–0.9)
ECHO LV POSTERIOR WALL SYSTOLIC: 2.06 CM
ERYTHROCYTE [DISTWIDTH] IN BLOOD BY AUTOMATED COUNT: 17.2 % (ref 11.5–14.5)
GLUCOSE BLD STRIP.AUTO-MCNC: 115 MG/DL (ref 65–117)
GLUCOSE BLD STRIP.AUTO-MCNC: 156 MG/DL (ref 65–117)
GLUCOSE BLD STRIP.AUTO-MCNC: 265 MG/DL (ref 65–117)
GLUCOSE SERPL-MCNC: 291 MG/DL (ref 65–100)
HCT VFR BLD AUTO: 40.4 % (ref 35–47)
HGB BLD-MCNC: 12.7 G/DL (ref 11.5–16)
MCH RBC QN AUTO: 29.1 PG (ref 26–34)
MCHC RBC AUTO-ENTMCNC: 31.4 G/DL (ref 30–36.5)
MCV RBC AUTO: 92.4 FL (ref 80–99)
NRBC # BLD: 0 K/UL (ref 0–0.01)
NRBC BLD-RTO: 0 PER 100 WBC
PLATELET # BLD AUTO: 184 K/UL (ref 150–400)
PMV BLD AUTO: 11.5 FL (ref 8.9–12.9)
POTASSIUM SERPL-SCNC: 4.1 MMOL/L (ref 3.5–5.1)
RBC # BLD AUTO: 4.37 M/UL (ref 3.8–5.2)
SERVICE CMNT-IMP: ABNORMAL
SERVICE CMNT-IMP: ABNORMAL
SERVICE CMNT-IMP: NORMAL
SODIUM SERPL-SCNC: 142 MMOL/L (ref 136–145)
WBC # BLD AUTO: 9 K/UL (ref 3.6–11)

## 2021-10-06 PROCEDURE — C1894 INTRO/SHEATH, NON-LASER: HCPCS | Performed by: INTERNAL MEDICINE

## 2021-10-06 PROCEDURE — 77030008543 HC TBNG MON PRSS MRTM -A: Performed by: INTERNAL MEDICINE

## 2021-10-06 PROCEDURE — 77030008684 HC TU ET CUF COVD -B: Performed by: NURSE ANESTHETIST, CERTIFIED REGISTERED

## 2021-10-06 PROCEDURE — 74011250636 HC RX REV CODE- 250/636: Performed by: NURSE ANESTHETIST, CERTIFIED REGISTERED

## 2021-10-06 PROCEDURE — 93308 TTE F-UP OR LMTD: CPT

## 2021-10-06 PROCEDURE — 76210000006 HC OR PH I REC 0.5 TO 1 HR

## 2021-10-06 PROCEDURE — 82962 GLUCOSE BLOOD TEST: CPT

## 2021-10-06 PROCEDURE — 33340 PERQ CLSR TCAT L ATR APNDGE: CPT

## 2021-10-06 PROCEDURE — 85347 COAGULATION TIME ACTIVATED: CPT

## 2021-10-06 PROCEDURE — 77030013079 HC BLNKT BAIR HGGR 3M -A: Performed by: NURSE ANESTHETIST, CERTIFIED REGISTERED

## 2021-10-06 PROCEDURE — 77030013797 HC KT TRNSDUC PRSSR EDWD -A: Performed by: INTERNAL MEDICINE

## 2021-10-06 PROCEDURE — B24BZZ4 ULTRASONOGRAPHY OF HEART WITH AORTA, TRANSESOPHAGEAL: ICD-10-PCS | Performed by: ANESTHESIOLOGY

## 2021-10-06 PROCEDURE — 77030026438 HC STYL ET INTUB CARD -A: Performed by: NURSE ANESTHETIST, CERTIFIED REGISTERED

## 2021-10-06 PROCEDURE — 77030016707 HC CATH ANGI DX SUPT1 CARD -B: Performed by: INTERNAL MEDICINE

## 2021-10-06 PROCEDURE — 76060000033 HC ANESTHESIA 1 TO 1.5 HR: Performed by: INTERNAL MEDICINE

## 2021-10-06 PROCEDURE — 74011000636 HC RX REV CODE- 636: Performed by: INTERNAL MEDICINE

## 2021-10-06 PROCEDURE — 02L73DK OCCLUSION OF LEFT ATRIAL APPENDAGE WITH INTRALUMINAL DEVICE, PERCUTANEOUS APPROACH: ICD-10-PCS | Performed by: INTERNAL MEDICINE

## 2021-10-06 PROCEDURE — C1760 CLOSURE DEV, VASC: HCPCS | Performed by: INTERNAL MEDICINE

## 2021-10-06 PROCEDURE — 85027 COMPLETE CBC AUTOMATED: CPT

## 2021-10-06 PROCEDURE — 74011000250 HC RX REV CODE- 250: Performed by: NURSE ANESTHETIST, CERTIFIED REGISTERED

## 2021-10-06 PROCEDURE — C1889 IMPLANT/INSERT DEVICE, NOC: HCPCS | Performed by: INTERNAL MEDICINE

## 2021-10-06 PROCEDURE — 93312 ECHO TRANSESOPHAGEAL: CPT

## 2021-10-06 PROCEDURE — 80048 BASIC METABOLIC PNL TOTAL CA: CPT

## 2021-10-06 PROCEDURE — 74011000258 HC RX REV CODE- 258: Performed by: NURSE ANESTHETIST, CERTIFIED REGISTERED

## 2021-10-06 PROCEDURE — 77030018729 HC ELECTRD DEFIB PAD CARD -B: Performed by: INTERNAL MEDICINE

## 2021-10-06 PROCEDURE — 65660000000 HC RM CCU STEPDOWN

## 2021-10-06 PROCEDURE — 36415 COLL VENOUS BLD VENIPUNCTURE: CPT

## 2021-10-06 PROCEDURE — 2709999900 HC NON-CHARGEABLE SUPPLY: Performed by: INTERNAL MEDICINE

## 2021-10-06 DEVICE — LEFT ATRIAL APPENDAGE CLOSURE DEVICE WITH DELIVERY SYSTEM
Type: IMPLANTABLE DEVICE | Status: FUNCTIONAL
Brand: WATCHMAN FLX™

## 2021-10-06 RX ORDER — SODIUM CHLORIDE, SODIUM LACTATE, POTASSIUM CHLORIDE, CALCIUM CHLORIDE 600; 310; 30; 20 MG/100ML; MG/100ML; MG/100ML; MG/100ML
25 INJECTION, SOLUTION INTRAVENOUS CONTINUOUS
Status: CANCELLED | OUTPATIENT
Start: 2021-10-06

## 2021-10-06 RX ORDER — ISOSORBIDE MONONITRATE 30 MG/1
60 TABLET, EXTENDED RELEASE ORAL DAILY
Status: DISCONTINUED | OUTPATIENT
Start: 2021-10-07 | End: 2021-10-06 | Stop reason: HOSPADM

## 2021-10-06 RX ORDER — TORSEMIDE 20 MG/1
20 TABLET ORAL DAILY
Status: DISCONTINUED | OUTPATIENT
Start: 2021-10-07 | End: 2021-10-06 | Stop reason: HOSPADM

## 2021-10-06 RX ORDER — IPRATROPIUM BROMIDE AND ALBUTEROL SULFATE 2.5; .5 MG/3ML; MG/3ML
3 SOLUTION RESPIRATORY (INHALATION)
Status: DISCONTINUED | OUTPATIENT
Start: 2021-10-06 | End: 2021-10-06 | Stop reason: HOSPADM

## 2021-10-06 RX ORDER — ATORVASTATIN CALCIUM 40 MG/1
40 TABLET, FILM COATED ORAL
Status: DISCONTINUED | OUTPATIENT
Start: 2021-10-06 | End: 2021-10-06 | Stop reason: HOSPADM

## 2021-10-06 RX ORDER — LANOLIN ALCOHOL/MO/W.PET/CERES
400 CREAM (GRAM) TOPICAL DAILY
Status: DISCONTINUED | OUTPATIENT
Start: 2021-10-07 | End: 2021-10-06 | Stop reason: HOSPADM

## 2021-10-06 RX ORDER — SODIUM CHLORIDE 0.9 % (FLUSH) 0.9 %
5-40 SYRINGE (ML) INJECTION AS NEEDED
Status: DISCONTINUED | OUTPATIENT
Start: 2021-10-06 | End: 2021-10-06 | Stop reason: HOSPADM

## 2021-10-06 RX ORDER — LIDOCAINE HYDROCHLORIDE 10 MG/ML
0.1 INJECTION, SOLUTION EPIDURAL; INFILTRATION; INTRACAUDAL; PERINEURAL AS NEEDED
Status: CANCELLED | OUTPATIENT
Start: 2021-10-06

## 2021-10-06 RX ORDER — VENLAFAXINE HYDROCHLORIDE 37.5 MG/1
150 CAPSULE, EXTENDED RELEASE ORAL 2 TIMES DAILY WITH MEALS
Status: DISCONTINUED | OUTPATIENT
Start: 2021-10-06 | End: 2021-10-06 | Stop reason: HOSPADM

## 2021-10-06 RX ORDER — LIDOCAINE HYDROCHLORIDE 20 MG/ML
INJECTION, SOLUTION EPIDURAL; INFILTRATION; INTRACAUDAL; PERINEURAL AS NEEDED
Status: DISCONTINUED | OUTPATIENT
Start: 2021-10-06 | End: 2021-10-06 | Stop reason: HOSPADM

## 2021-10-06 RX ORDER — PHENYLEPHRINE HCL IN 0.9% NACL 0.4MG/10ML
SYRINGE (ML) INTRAVENOUS
Status: DISCONTINUED | OUTPATIENT
Start: 2021-10-06 | End: 2021-10-06 | Stop reason: HOSPADM

## 2021-10-06 RX ORDER — HYDROMORPHONE HYDROCHLORIDE 1 MG/ML
.2-.5 INJECTION, SOLUTION INTRAMUSCULAR; INTRAVENOUS; SUBCUTANEOUS
Status: CANCELLED | OUTPATIENT
Start: 2021-10-06

## 2021-10-06 RX ORDER — FENTANYL CITRATE 50 UG/ML
25 INJECTION, SOLUTION INTRAMUSCULAR; INTRAVENOUS
Status: CANCELLED | OUTPATIENT
Start: 2021-10-06

## 2021-10-06 RX ORDER — ACETAMINOPHEN 325 MG/1
650 TABLET ORAL
Status: DISCONTINUED | OUTPATIENT
Start: 2021-10-06 | End: 2021-10-06 | Stop reason: HOSPADM

## 2021-10-06 RX ORDER — FENTANYL CITRATE 50 UG/ML
INJECTION, SOLUTION INTRAMUSCULAR; INTRAVENOUS AS NEEDED
Status: DISCONTINUED | OUTPATIENT
Start: 2021-10-06 | End: 2021-10-06 | Stop reason: HOSPADM

## 2021-10-06 RX ORDER — GUAIFENESIN 100 MG/5ML
81 LIQUID (ML) ORAL DAILY
Status: DISCONTINUED | OUTPATIENT
Start: 2021-10-07 | End: 2021-10-06 | Stop reason: HOSPADM

## 2021-10-06 RX ORDER — CLONAZEPAM 0.5 MG/1
0.5 TABLET ORAL
Status: DISCONTINUED | OUTPATIENT
Start: 2021-10-06 | End: 2021-10-06 | Stop reason: HOSPADM

## 2021-10-06 RX ORDER — TRAMADOL HYDROCHLORIDE 50 MG/1
50 TABLET ORAL
Status: DISCONTINUED | OUTPATIENT
Start: 2021-10-06 | End: 2021-10-06 | Stop reason: HOSPADM

## 2021-10-06 RX ORDER — PROPOFOL 10 MG/ML
INJECTION, EMULSION INTRAVENOUS AS NEEDED
Status: DISCONTINUED | OUTPATIENT
Start: 2021-10-06 | End: 2021-10-06 | Stop reason: HOSPADM

## 2021-10-06 RX ORDER — SODIUM CHLORIDE 9 MG/ML
INJECTION, SOLUTION INTRAVENOUS
Status: DISCONTINUED | OUTPATIENT
Start: 2021-10-06 | End: 2021-10-06 | Stop reason: HOSPADM

## 2021-10-06 RX ORDER — SODIUM CHLORIDE, SODIUM LACTATE, POTASSIUM CHLORIDE, CALCIUM CHLORIDE 600; 310; 30; 20 MG/100ML; MG/100ML; MG/100ML; MG/100ML
25 INJECTION, SOLUTION INTRAVENOUS CONTINUOUS
Status: CANCELLED | OUTPATIENT
Start: 2021-10-06 | End: 2021-10-07

## 2021-10-06 RX ORDER — FENTANYL CITRATE 50 UG/ML
50 INJECTION, SOLUTION INTRAMUSCULAR; INTRAVENOUS AS NEEDED
Status: CANCELLED | OUTPATIENT
Start: 2021-10-06

## 2021-10-06 RX ORDER — POTASSIUM CHLORIDE 750 MG/1
10 CAPSULE, EXTENDED RELEASE ORAL 4 TIMES DAILY
COMMUNITY
End: 2022-07-22

## 2021-10-06 RX ORDER — CARVEDILOL 12.5 MG/1
25 TABLET ORAL 2 TIMES DAILY WITH MEALS
Status: DISCONTINUED | OUTPATIENT
Start: 2021-10-06 | End: 2021-10-06 | Stop reason: HOSPADM

## 2021-10-06 RX ORDER — SUCCINYLCHOLINE CHLORIDE 20 MG/ML
INJECTION INTRAMUSCULAR; INTRAVENOUS AS NEEDED
Status: DISCONTINUED | OUTPATIENT
Start: 2021-10-06 | End: 2021-10-06 | Stop reason: HOSPADM

## 2021-10-06 RX ORDER — PANTOPRAZOLE SODIUM 40 MG/1
40 TABLET, DELAYED RELEASE ORAL
Status: DISCONTINUED | OUTPATIENT
Start: 2021-10-07 | End: 2021-10-06 | Stop reason: HOSPADM

## 2021-10-06 RX ORDER — MIDAZOLAM HYDROCHLORIDE 1 MG/ML
1 INJECTION, SOLUTION INTRAMUSCULAR; INTRAVENOUS AS NEEDED
Status: CANCELLED | OUTPATIENT
Start: 2021-10-06

## 2021-10-06 RX ORDER — HEPARIN SODIUM 1000 [USP'U]/ML
INJECTION, SOLUTION INTRAVENOUS; SUBCUTANEOUS AS NEEDED
Status: DISCONTINUED | OUTPATIENT
Start: 2021-10-06 | End: 2021-10-06 | Stop reason: HOSPADM

## 2021-10-06 RX ORDER — SODIUM CHLORIDE 0.9 % (FLUSH) 0.9 %
5-40 SYRINGE (ML) INJECTION EVERY 8 HOURS
Status: DISCONTINUED | OUTPATIENT
Start: 2021-10-06 | End: 2021-10-06 | Stop reason: HOSPADM

## 2021-10-06 RX ORDER — DEXAMETHASONE SODIUM PHOSPHATE 4 MG/ML
INJECTION, SOLUTION INTRA-ARTICULAR; INTRALESIONAL; INTRAMUSCULAR; INTRAVENOUS; SOFT TISSUE AS NEEDED
Status: DISCONTINUED | OUTPATIENT
Start: 2021-10-06 | End: 2021-10-06 | Stop reason: HOSPADM

## 2021-10-06 RX ORDER — ONDANSETRON 2 MG/ML
4 INJECTION INTRAMUSCULAR; INTRAVENOUS AS NEEDED
Status: CANCELLED | OUTPATIENT
Start: 2021-10-06

## 2021-10-06 RX ORDER — ZOLPIDEM TARTRATE 5 MG/1
5 TABLET ORAL
Status: DISCONTINUED | OUTPATIENT
Start: 2021-10-06 | End: 2021-10-06 | Stop reason: HOSPADM

## 2021-10-06 RX ORDER — HYDRALAZINE HYDROCHLORIDE 50 MG/1
75 TABLET, FILM COATED ORAL 3 TIMES DAILY
Status: DISCONTINUED | OUTPATIENT
Start: 2021-10-06 | End: 2021-10-06 | Stop reason: HOSPADM

## 2021-10-06 RX ORDER — POTASSIUM CHLORIDE 750 MG/1
20 TABLET, FILM COATED, EXTENDED RELEASE ORAL
Status: DISCONTINUED | OUTPATIENT
Start: 2021-10-07 | End: 2021-10-06 | Stop reason: HOSPADM

## 2021-10-06 RX ADMIN — Medication 20 MCG/MIN: at 12:46

## 2021-10-06 RX ADMIN — SODIUM CHLORIDE: 9 INJECTION, SOLUTION INTRAVENOUS at 12:30

## 2021-10-06 RX ADMIN — LIDOCAINE HYDROCHLORIDE 80 MG: 20 INJECTION, SOLUTION INTRAVENOUS at 12:35

## 2021-10-06 RX ADMIN — HEPARIN SODIUM 12000 UNITS: 1000 INJECTION, SOLUTION INTRAVENOUS; SUBCUTANEOUS at 13:15

## 2021-10-06 RX ADMIN — DEXAMETHASONE SODIUM PHOSPHATE 4 MG: 4 INJECTION, SOLUTION INTRAMUSCULAR; INTRAVENOUS at 12:41

## 2021-10-06 RX ADMIN — PROPOFOL 120 MG: 10 INJECTION, EMULSION INTRAVENOUS at 12:35

## 2021-10-06 RX ADMIN — SUCCINYLCHOLINE CHLORIDE 160 MG: 20 INJECTION, SOLUTION INTRAMUSCULAR; INTRAVENOUS at 12:35

## 2021-10-06 RX ADMIN — FENTANYL CITRATE 50 MCG: 50 INJECTION, SOLUTION INTRAMUSCULAR; INTRAVENOUS at 12:35

## 2021-10-06 NOTE — ANESTHESIA PROCEDURE NOTES
Arterial Line Placement    Start time: 10/6/2021 12:01 PM  End time: 10/6/2021 12:07 PM  Performed by: Kelley Mace MD  Authorized by:  Kelley Mace MD     Pre-Procedure  Indications:  Arterial pressure monitoring  Preanesthetic Checklist: patient identified, risks and benefits discussed, anesthesia consent, site marked, patient being monitored, timeout performed and patient being monitored      Procedure:   Prep:  Chlorhexidine  Seldinger Technique?: Yes    Orientation:  Left  Location:  Radial artery  Catheter size:  20 G  Number of attempts:  1  Cont Cardiac Output Sensor: No      Assessment:   Post-procedure:  Line secured and sterile dressing applied  Patient Tolerance:  Patient tolerated the procedure well with no immediate complications

## 2021-10-06 NOTE — ANESTHESIA PREPROCEDURE EVALUATION
Relevant Problems   RESPIRATORY SYSTEM   (+) Acute asthma exacerbation   (+) Asthma      NEUROLOGY   (+) Depression   (+) Migraine with aura, without mention of intractable migraine without mention of status migrainosus      CARDIOVASCULAR   (+) CAD (coronary artery disease)   (+) Chronic atrial fibrillation (HCC)   (+) HBP (high blood pressure)      RENAL FAILURE   (+) Stage 3 chronic kidney disease      ENDOCRINE   (+) Type 2 diabetes mellitus with diabetic neuropathy, without long-term current use of insulin (HCC)   (+) Type 2 diabetes with nephropathy (HCC)       Anesthetic History   No history of anesthetic complications            Review of Systems / Medical History  Patient summary reviewed, nursing notes reviewed and pertinent labs reviewed    Pulmonary        Sleep apnea: CPAP    Asthma : well controlled       Neuro/Psych         Psychiatric history     Cardiovascular    Hypertension        Dysrhythmias : atrial fibrillation  Pacemaker, CAD and cardiac stents    Exercise tolerance: >4 METS  Comments: EF 35-40   GI/Hepatic/Renal     GERD           Endo/Other    Diabetes: type 2    Morbid obesity and arthritis     Other Findings            Physical Exam    Airway  Mallampati: III  TM Distance: 4 - 6 cm  Neck ROM: normal range of motion   Mouth opening: Normal     Cardiovascular    Rhythm: irregular  Rate: normal         Dental  No notable dental hx       Pulmonary  Breath sounds clear to auscultation               Abdominal  GI exam deferred       Other Findings            Anesthetic Plan    ASA: 3  Anesthesia type: general    Monitoring Plan: Arterial line and ANGELITO      Induction: Intravenous  Anesthetic plan and risks discussed with: Patient

## 2021-10-06 NOTE — ANESTHESIA POSTPROCEDURE EVALUATION
Post-Anesthesia Evaluation and Assessment    Patient: Mich Sears MRN: [id number]  SSN: [id number]    YOB: 1950  Age: 73 y.o. Sex: female      I have evaluated the patient and they are stable and ready for discharge from the PACU. Cardiovascular Function/Vital Signs  Visit Vitals  /64   Pulse 80   Temp 36.7 °C (98.1 °F)   Resp 18   Ht 5' 7\" (1.702 m)   Wt 115.2 kg (254 lb)   SpO2 92%   BMI 39.78 kg/m²       Patient is status post General anesthesia for Procedure(s):  WATCHMAN BRAYDON CLOSURE DEVICE. Nausea/Vomiting: None    Postoperative hydration reviewed and adequate. Pain:  Pain Scale 1: Numeric (0 - 10) (10/06/21 1352)  Pain Intensity 1: 0 (10/06/21 1352)   Managed    Neurological Status:   Neuro (WDL): Exceptions to WDL (10/06/21 1352)  Neuro  Neurologic State: Drowsy (10/06/21 1352)  Orientation Level: Oriented to person;Oriented to place;Oriented to situation (10/06/21 1352)  Cognition: Follows commands (10/06/21 1352)  Speech: Clear (10/06/21 1352)  LUE Motor Response: Purposeful (10/06/21 1352)  LLE Motor Response: Purposeful (10/06/21 1352)  RUE Motor Response: Purposeful (10/06/21 1352)  RLE Motor Response: Purposeful (10/06/21 1352)   At baseline    Mental Status, Level of Consciousness: Alert and  oriented to person, place, and time    Pulmonary Status:   O2 Device: Nasal cannula (10/06/21 1430)   Adequate oxygenation and airway patent    Complications related to anesthesia: None    Post-anesthesia assessment completed. No concerns    Signed By: Gloria Long MD     October 6, 2021              Procedure(s):  WATCHMAN BRAYDON CLOSURE DEVICE. general    <BSHSIANPOST>    INITIAL Post-op Vital signs:   Vitals Value Taken Time   /59 10/06/21 1515   Temp 36.7 °C (98.1 °F) 10/06/21 1459   Pulse 80 10/06/21 1523   Resp 19 10/06/21 1523   SpO2 90 % 10/06/21 1523   Vitals shown include unvalidated device data.

## 2021-10-06 NOTE — Clinical Note
A venogram was performed on the Other (document in comment). Injected with single hand injection. Injection volume  = 5 mL.  BRAYDON

## 2021-10-06 NOTE — PERIOP NOTES
[facility] from Operating Room to PACU    Report received from [facility] EP lab and DANIEL Ríos CRNA regarding Racine Dowse. Surgeon(s):  Anesthesia, Case  And Procedure(s) (LRB):  SPECIAL PROCEDURE OUTSIDE OF OR (N/A)  confirmed   with allergies and dressings discussed. Anesthesia type, drugs, patient history, complications, estimated blood loss, vital signs, intake and output, and last pain medication, lines and temperature were reviewed. 1435 Left radial elvin removed. Handheld pressure x 5 minutes. Dressing applied. 1445 TRANSFER - OUT REPORT:    Verbal report given to [facility] RN(name) on Racine Bailey  being transferred to IVCU(unit) for routine post - op       Report consisted of patients Situation, Background, Assessment and   Recommendations(SBAR). Information from the following report(s) SBAR, Kardex, Procedure Summary, Intake/Output and MAR was reviewed with the receiving nurse. Opportunity for questions and clarification was provided.       Patient transported with:   Monitor  O2 @ 2 liters  Registered Nurse   Tech  Patient chart  No patient belongings in PACU

## 2021-10-06 NOTE — Clinical Note
Sheath #1: Sheath: inserted. Sheath inserted/placed in the right femoral  vein. Hemostasis achieved.  16fr sheath advanced RFV/ aspirated and flushed

## 2021-10-06 NOTE — H&P
EP/ ARRHYTHMIA Preprocedure Note    Patient ID:  Patient: Vidal Michaud  MRN: [id number]  Age: 73 y.o.  : 1950    Date of  Admission: 10/6/2021  9:09 AM   PCP:  Onelia Adhikari MD    Assessment: 1. Watchman candidate. Plan:     1. Given the potential benefits, risks, and alternatives, she agrees to proceed. Vidal Michaud is a 73 y.o. female with a history of the above here for her procedure.       Past Medical History:   Diagnosis Date    Anxiety 2018    Arthritis     OA    Asthma     CAD (coronary artery disease)     Diabetes ([facility] [address]     Essential hypertension     GERD (gastroesophageal reflux disease)     Hypercholesterolemia 2018    Hypertension     Ill-defined condition     diverticulitis    Long-term use of high-risk medication 2018    Neuropathy     Obesity (BMI 30-39.9) 2019    Other ill-defined conditions(799.89)     IBS, spinal stenosis    Plantar fasciitis     Psychiatric disorder     depression, anxiety    Sleep apnea     uses CPAP    Type 2 diabetes mellitus with diabetic neuropathy, without long-term current use of insulin ([facility] [address] 2016        Past Surgical History:   Procedure Laterality Date    COLONOSCOPY N/A 3/14/2018    [facility] Cat Diana [facility] ENDOSCOPY    HX APPENDECTOMY      HX CHOLECYSTECTOMY  11/15/2018    HX HYSTERECTOMY      HX PACEMAKER      IR KYPHOPLASTY LUMBAR  2020    IR KYPHOPLASTY THORACIC  2021    [place] CARDIAC SURG PROCEDURE UNLIST      stent       Social History     Tobacco Use    Smoking status: Never Smoker    Smokeless tobacco: Never Used   Substance Use Topics    Alcohol use: No        Family History   Problem Relation Age of Onset    Arthritis-osteo Mother     Hypertension Mother     High Cholesterol Mother     Crohn's Disease Mother     Heart Disease Mother     Alcohol abuse Father     High Cholesterol Sister     Hypertension Sister    [facility] Thyroid Disease Sister     COPD Sister     High Cholesterol Brother     Hypertension Brother     COPD Brother     COPD Child     Inflammatory Bowel Dz Child         Allergies   Allergen Reactions    Sulfa (Sulfonamide Antibiotics) Swelling    Amoxicillin Swelling          No current facility-administered medications for this encounter. Facility-Administered Medications Ordered in Other Encounters   Medication Dose Route Frequency    dexamethasone (DECADRON) 4 mg/mL injection   IntraVENous PRN    lidocaine (PF) (XYLOCAINE) 20 mg/mL (2 %) injection   IntraVENous PRN    fentaNYL citrate (PF) injection   IntraVENous PRN    succinylcholine (ANECTINE) injection   IntraVENous PRN    propofoL (DIPRIVAN) 10 mg/mL injection   IntraVENous PRN    PHENYLephrine (NEOSYNEPHRINE) in NS syringe   IntraVENous CONTINUOUS    0.9% sodium chloride infusion   IntraVENous CONTINUOUS       Review of Symptoms:  See OV. Objective:      Physical Exam:  Temp (24hrs), Av.1 °F (36.7 °C), Min:98.1 °F (36.7 °C), Max:98.1 °F (36.7 °C)    Patient Vitals for the past 8 hrs:   Pulse   10/06/21 1132 80    Patient Vitals for the past 8 hrs:   Resp   10/06/21 1132 21    Patient Vitals for the past 8 hrs:   BP   10/06/21 1132 (!) 154/78      No intake or output data in the 24 hours ending 10/06/21 1251    Nondiaphoretic, not in acute distress. Paced rhythm. Neuro grossly nonfocal.  No tremor. Awake and appropriate.       Alessandra Caal MD  10/6/2021

## 2021-10-06 NOTE — PROGRESS NOTES
Last BP recorded is 133/77. Hasn't made it to vitals summary yet. Echo did not show a significant pericardial effusion accumulation. Plan for discharge tonight.

## 2021-10-06 NOTE — Clinical Note
TRANSFER - IN REPORT:     Verbal report received from: [facility]. Report consisted of patient's Situation, Background, Assessment and   Recommendations(SBAR). Opportunity for questions and clarification was provided. Assessment completed upon patient's arrival to unit and care assumed. Patient transported with a Registered Nurse.

## 2021-10-06 NOTE — ANESTHESIA PROCEDURE NOTES
ANGELITO        Procedure Details: probe placement, image aquisition & interpretation    Risks and benefits discussed with the patient and plans are to proceed    Procedure Note    Performed by: Mague Rivera MD  Authorized by: Mague Rivera MD       Indications: assessment of surgical repair  Modalities: 2D, CF  Probe Type: multiplane  Insertion: atraumatic  Patient Status: intubated and sedated    Post Intervention Follow-up Study         Valve  Function  Regurgitation  Area    Aortic       Mitral       Tricuspid       Prosthetic        Complications: None  Comments: Intraoperative ANGELITO requested for watchman placement. No BRAYDON thrombus. No preexisting pericardial effusion. 24 mm Watchman successfully deployed into the BRAYDON with good compression and no leak as assessed by CFD. Left to right shunt at the transseptal site. No pericardial effusion at the end of the procedure.

## 2021-10-06 NOTE — PROGRESS NOTES
Patient walked hallway of IVCU. Patient had no complaints of dizziness, SOB or signs of distress. I have reviewed discharge instructions with the patient. The patient verbalized understanding. Discharge medications reviewed with patient and appropriate educational materials regarding medications and side effects teaching were provided. Site care instructions reviewed with patient. Pain management teaching completed. Patient instructed to make follow up appointments per discharge instructions. Patient belongings packed up and accounted for with patient and family. All patient belongings sent home with patient. Telemetry monitor and wires removed      Patient signed discharge instructions after reviewing them, and duplicate copy placed in chart.

## 2021-10-06 NOTE — Clinical Note
Sheath #1: Sheath: inserted. Sheath inserted/placed in the right femoral  vein. Hemostasis achieved.  sheath aspirated and flushed

## 2021-10-06 NOTE — DISCHARGE INSTRUCTIONS
[facility]      Patient ID:  Yoko Sams  [id number]  17 y.o.  1950    Admit Date: 10/6/2021  Discharge Date: 10/6/2021     Admitting Physician: Andrzej Rosa MD   Discharge Physician: Andrzej Rosa MD    Admission Diagnoses:   Atrial fibrillation, unspecified type [facility]) [I48.91]  Atrial fibrillation ([facility] [address] [I48.91]  Presence of Watchman left atrial appendage closure device [Z95.818]    Discharge Diagnoses: Active Problems:    Atrial fibrillation ([address] (10/6/2021)      Presence of Watchman left atrial appendage closure device (10/6/2021)      Discharge Condition: Good    Cardiology Procedures this Admission:  Watchman implant    Disposition: home with a     Follow-up:  Call the office to make an appointment with the nurse practitioner in 2 weeks. S/P WATCHMAN DISCHARGE INSTRUCTIONS    It is normal to feel tired the first couple days. Take it easy and follow the physicians instructions. CHECK THE CATHETER INSERTION SITE DAILY:  You may shower 24 hours after the procedure, remove the bandage during showering. Wash with soap and water and pat dry. Gentle cleaning of the site with soap and water is sufficient, cover with a dry clean dressing or bandage. Do not apply creams or powders to the area. Do not sit in a bathtub or pool of water for 7 days or until wound has completely healed. Temporary bruising and discomfort is normal and may last a few weeks. You may have a  formation of a small lump at the site which may last up to 6 weeks. CALL THE PHYSICIAN:  If the site becomes red, swollen or feels warm to the touch  If there is bleeding or drainage or if there is unusual pain at the groin or down the leg. If there is any bleeding, lie down, apply pressure or have someone apply pressure with a clean cloth until the bleeding stops.   If the bleeding continues, call 911 to be transported to the hospital.  DO NOT DRIVE YOURSELF, Dona 911.    Activity:    For the first 24-48 hours or as instructed by the physician:  No lifting, pushing or pulling over 5 pounds and no straining the insertion site. Do not life grocery bags or the garbage can, do not run the vacuum  or  for 7 days. Start with short walks as in the hospital and gradually increase as tolerated each day. It is recommended to walk 30 minutes 5-7 days per week. Follow your physicians instructions on activity. Avoid walking outside in extremes of heat or cold. Walk inside when it is cold and windy or hot and humid. Things to keep in mind:  No driving for at least 5 days, or as designated by your physician. Limit the number of times you go up and down the stairs  Take rests and pace yourself with activity. Be careful and do not strain with bowel movements. Medications: Take all medications as prescribed  Call your physician if you have any questions  Keep an updated list of your medications with you at all times and give a list to your physician and pharmacist    Signs and Symptoms:  Be cautious of symptoms of angina or recurrent symptoms such as chest discomfort, unusual shortness of breath or fatigue, palpitations. After Care: Follow up with your physician as instructed. Follow a heart healthy diet with proper portion control, daily stress management, daily exercise, blood pressure and cholesterol control , and smoking cessation. AFTER YOU TRANSESOPHAGEAL ECHOCARDIOGRAM    Do not eat or drink for at least two hours after your procedure. Your throat will be numb and there is a risk you might have difficulty swallowing for a while. Be careful when you do eat or drink for the first time especially with hot fluids since you could easily burn your throat. Call your doctor if:    · You are bleeding from your throat or mouth. · You have trouble breathing all of a sudden.   · You have chest pain or any pain that spreads to your neck, jaw, or arms.  · You have questions or concerns.   · You have a fever greater than 101°F.      Signed By: Andrzej Rosa MD     October 6, 2021

## 2021-10-06 NOTE — Clinical Note
Transseptal Cath Performed under hemodynamic and Fluoro and ANGELITO, RF VersaCross wire/ Phani Moons used

## 2021-10-06 NOTE — Clinical Note
TRANSFER - OUT REPORT:     Verbal report given to: DIAN Harper, (at bedside). Report consisted of patient's Situation, Background, Assessment and   Recommendations(SBAR). Opportunity for questions and clarification was provided. Patient transported with a Registered Nurse, Monitor and Oxygen. Oxygen used for patient = nasal cannula, @ 2 - 6 Liters.     Patient transported to: PACU.   transported with CRNA and EP Lab staff

## 2021-10-07 ENCOUNTER — PATIENT OUTREACH (OUTPATIENT)
Dept: CASE MANAGEMENT | Age: 71
End: 2021-10-07

## 2021-10-07 LAB — ACT BLD: 263 SECS (ref 79–138)

## 2021-10-07 NOTE — DISCHARGE SUMMARY
Cardiology Discharge Summary             Patient ID:  Sandra Smith  [id number]  89 y.o.  1950    Admit Date: 10/6/2021     Discharge Date: 10/6/2021   Admitting Physician: Milly Echols MD   Discharge Physician: Milly Echols MD    Admission and Discharge Diagnoses include:  1. Watchman candidate    Cardiology Procedures this Admission:  1. Successful Watchman implant    Hospital Course and Discharge Exam:  She was admitted for Watchman implant. No complications. Her limited post-implant echo looked OK. Discharged today, ambulatory and taking oral.  All questions answered for her and her . Aware of signs and symptoms warranting urgent medical follow-up or calling 911. Visit Vitals  /73   Pulse 81   Temp 98.1 °F (36.7 °C)   Resp 20   Ht 5' 7\" (1.702 m)   Wt 115.2 kg (253 lb 15.5 oz)   SpO2 96%   BMI 39.78 kg/m²       Physical Exam:  Nondiaphoretic, not in acute distress. Unlabored, clear to auscultation bilaterally. R groin site OK. Awake, appropriate, neuro grossly nonfocal.  Ambulatory. Disposition: home with a       FOLLOW-UP:       Call the office [phone] to make an appointment for 2 weeks. [address] [facility], [address]    [phone]  [address]    [email or web address]    Signed:  Milly Echols MD  10/6/2021

## 2021-10-08 RX ORDER — ACETAMINOPHEN 500 MG
500 TABLET ORAL
COMMUNITY

## 2021-10-08 RX ORDER — POLYETHYLENE GLYCOL 3350 17 G/17G
17 POWDER, FOR SOLUTION ORAL
COMMUNITY

## 2021-10-08 NOTE — PROGRESS NOTES
Care Transitions Initial Call    Call within 2 business days of discharge: Yes     Patient: Julia Wilson Patient : 1950 MRN: [id number]    Last Discharge [address]       Complaint Diagnosis Description Type Department Provider    10/6/21  Atrial fibrillation, unspecified type [facility]) Admission (Discharged) Holley Yi MD          Was this an external facility discharge? No     Challenges to be reviewed by the provider   Additional needs identified to be addressed with provider:  Yes    S/P Watchman procedure done 10/6/21. Has follow up with Dr. Lorna Oro on 10/27- will remain on Eliquis 5 mg BID for 45 days post procedure. Omeprazole 40 mg daily- no longer covered by RX plan- clarify with PCP for alternative. Received HH therapy with [facility]. Method of communication with provider : chart routing    Discussed COVID-19 related testing which was available at this time. Test results were negative. Patient informed of results, if available? Pre-procedure     Advance Care Planning:   Does patient have an Advance Directive:  yes; reviewed and current  and health care decision makers updated    Inpatient Readmission Risk score: Unplanned Readmit Risk Score: 24    Was this a readmission? no     Patients top risk factors for readmission: lack of knowledge about disease, medical condition-  and polypharmacy   Interventions to address risk factors: Education of patient/family/caregiver/guardian to support self-management-  and Assessment and support for treatment adherence and medication management-     Care Transition Nurse (CTN) contacted the patient by telephone to perform post hospital discharge assessment. Verified name and  with patient as identifiers. Provided introduction to self, and explanation of the CTN role. CTN reviewed discharge instructions, medical action plan and red flags with patient who verbalized understanding.  Were discharge instructions available to patient? yes. Reviewed appropriate site of care based on symptoms and resources available to patient including: PCP, Specialist, [facility] and [address]. Patient given an opportunity to ask questions and does not have any further questions or concerns at this time. The patient agrees to contact the PCP office for questions related to their healthcare. Medication reconciliation was performed with patient, who verbalizes understanding of administration of home medications. Advised obtaining a 90-day supply of all daily and as-needed medications. Referral to Pharm D needed: no     Home Health/Outpatient orders at discharge: [address]: [facility]- resumption of care  Date of initial visit: next week will resume. Durable Medical Equipment ordered at discharge: None    Covid Risk Education    Educated patient about risk for severe COVID-19 due to risk factors according to CDC guidelines. CTN reviewed discharge instructions, medical action plan and red flag symptoms with the patient who verbalized understanding. Discussed COVID vaccination status: no. Education provided on COVID-19 vaccination as appropriate. Discussed exposure protocols and quarantine with CDC Guidelines. Patient was given an opportunity to verbalize any questions and concerns and agrees to contact CTN or health care provider for questions related to their healthcare. Was patient discharged with a pulse oximeter? NA    Discussed follow-up appointments. If no appointment was previously scheduled, appointment scheduling offered: not needed. Is follow up appointment scheduled within 7 days of discharge? no.   [address] follow up appointment(s):   Future Appointments   Date Time Provider Tammy Perales   1/12/2022  8:00 AM Lorraine Brady MD [facility] BS [facility]     Non-[facility] follow up appointment(s):   Cardiology- Dr. Gama Rhodes- 10/27/21.      Plan for follow-up call in 7-10 days based on severity of symptoms and risk factors. Plan for next call:   · Assess current recovery from Watchman procedure- including monitoring for s/sx bleeding and Vital signs. · Follow up on RF procedure done on 10/18 with Dr. Eryn Jauregui. · Clarification of changing from omeprazole to alternative that will be covered by [employer]. CTN provided contact information for future needs. Goals Addressed                 This Visit's Progress       General     Reduce Risk of Hospitalization        10/8/21- spoke with Ms. Liz Denver She has cardiology follow up in place for 10/27- she will remain on Eliquis 5 mg BID for 45 days post procedure. She will monitor for s/sx bleeding.  Medication teaching done for all current medications taking. Updated list.     She has upcoming RF procedure to Left Back on 10/18, she had had right done. She is using RW for now and [facility] PT has been seeing her- will resume next week.       Updated [facility] care [facility]

## 2021-10-11 DIAGNOSIS — F41.9 ANXIETY: ICD-10-CM

## 2021-10-12 RX ORDER — CLONAZEPAM 0.5 MG/1
TABLET ORAL
Qty: 90 TABLET | Refills: 0 | Status: SHIPPED | OUTPATIENT
Start: 2021-10-12 | End: 2022-01-10

## 2021-10-19 ENCOUNTER — TELEPHONE (OUTPATIENT)
Dept: INTERNAL MEDICINE CLINIC | Age: 71
End: 2021-10-19

## 2021-10-19 NOTE — TELEPHONE ENCOUNTER
Steph Phelps would like a verbal order to see patient for one more visit next week to recertify visit.      [phone] Viv Roque

## 2021-10-20 NOTE — PROGRESS NOTES
Physician Progress Note      PATIENT:               Antony Pedersen  CSN #:                  [id number]  :                       1950  ADMIT DATE:       10/6/2021 9:09 AM  DISCH DATE:        10/6/2021 6:39 PM  RESPONDING  PROVIDER #:        Jose Danielson MD          QUERY TEXT:    Patient admitted with a fib , noted to have abnormal renal function labs . If possible, please document in progress notes and discharge summary if you are evaluating and/or treating any of the following:     The medical record reflects the following:  Risk Factors: HTN/diabetes  Clinical Indicators:  10/6/2021 16:35  BUN: 31 (H)  Creatinine: 1.70 (H)  GFR est non-AA: 30 (L)      Treatment: lab drawn    Thank you,  Ramila May RN, JUAN JOSE, Loc, CCDS  Certified Clinical   [phone]  Options provided:  -- CKD stage 3  -- renal labs  not clinically significant  -- Other - I will add my own diagnosis  -- Disagree - Not applicable / Not valid  -- Disagree - Clinically unable to determine / Unknown  -- Refer to Clinical Documentation Reviewer    PROVIDER RESPONSE TEXT:    This patient has CKD stage 3    Query created by: Cresencio Tate on 10/20/2021 11:52 AM      Electronically signed by:  Jose Danielson MD 10/20/2021 4:43 PM

## 2021-11-04 ENCOUNTER — PATIENT OUTREACH (OUTPATIENT)
Dept: CASE MANAGEMENT | Age: 71
End: 2021-11-04

## 2021-11-18 ENCOUNTER — HOSPITAL ENCOUNTER (OUTPATIENT)
Dept: PREADMISSION TESTING | Age: 71
Discharge: HOME | End: 2021-11-18
Payer: MEDICARE

## 2021-11-18 LAB
SARS-COV-2, XPLCVT: NOT DETECTED
SOURCE, COVRS: NORMAL

## 2021-11-18 PROCEDURE — U0005 INFEC AGEN DETEC AMPLI PROBE: HCPCS

## 2021-11-22 ENCOUNTER — HOSPITAL ENCOUNTER (OUTPATIENT)
Dept: NON INVASIVE DIAGNOSTICS | Age: 71
Discharge: HOME | End: 2021-11-22
Payer: MEDICARE

## 2021-11-22 VITALS
BODY MASS INDEX: 40.17 KG/M2 | RESPIRATION RATE: 20 BRPM | HEIGHT: 67 IN | HEART RATE: 81 BPM | WEIGHT: 255.95 LBS | SYSTOLIC BLOOD PRESSURE: 150 MMHG | OXYGEN SATURATION: 94 % | DIASTOLIC BLOOD PRESSURE: 88 MMHG

## 2021-11-22 DIAGNOSIS — I48.91 ATRIAL FIBRILLATION, UNSPECIFIED TYPE (HCC): ICD-10-CM

## 2021-11-22 LAB
ATRIAL RATE: 80 BPM
CALCULATED P AXIS, ECG09: 87 DEGREES
CALCULATED R AXIS, ECG10: -86 DEGREES
CALCULATED T AXIS, ECG11: 91 DEGREES
DIAGNOSIS, 93000: NORMAL
GLUCOSE BLD STRIP.AUTO-MCNC: 136 MG/DL (ref 65–117)
Q-T INTERVAL, ECG07: 462 MS
QRS DURATION, ECG06: 146 MS
QTC CALCULATION (BEZET), ECG08: 532 MS
SERVICE CMNT-IMP: ABNORMAL
VENTRICULAR RATE, ECG03: 80 BPM

## 2021-11-22 PROCEDURE — 93005 ELECTROCARDIOGRAM TRACING: CPT

## 2021-11-22 PROCEDURE — 99152 MOD SED SAME PHYS/QHP 5/>YRS: CPT

## 2021-11-22 PROCEDURE — 93325 DOPPLER ECHO COLOR FLOW MAPG: CPT

## 2021-11-22 PROCEDURE — 82962 GLUCOSE BLOOD TEST: CPT

## 2021-11-22 PROCEDURE — 74011000250 HC RX REV CODE- 250: Performed by: INTERNAL MEDICINE

## 2021-11-22 PROCEDURE — 74011250636 HC RX REV CODE- 250/636: Performed by: INTERNAL MEDICINE

## 2021-11-22 RX ORDER — GLUCOSAMINE SULFATE 1500 MG
POWDER IN PACKET (EA) ORAL DAILY
COMMUNITY

## 2021-11-22 RX ORDER — CLOPIDOGREL BISULFATE 75 MG/1
75 TABLET ORAL DAILY
Qty: 30 TABLET | Refills: 5 | Status: SHIPPED | OUTPATIENT
Start: 2021-11-22 | End: 2022-05-18 | Stop reason: SDUPTHER

## 2021-11-22 RX ORDER — FENTANYL CITRATE 50 UG/ML
12.5-5 INJECTION, SOLUTION INTRAMUSCULAR; INTRAVENOUS
Status: DISCONTINUED | OUTPATIENT
Start: 2021-11-22 | End: 2021-11-22

## 2021-11-22 RX ORDER — PANTOPRAZOLE SODIUM 40 MG/1
40 TABLET, DELAYED RELEASE ORAL DAILY
COMMUNITY
End: 2022-07-22

## 2021-11-22 RX ORDER — MIDAZOLAM HYDROCHLORIDE 1 MG/ML
.5-2 INJECTION, SOLUTION INTRAMUSCULAR; INTRAVENOUS
Status: DISCONTINUED | OUTPATIENT
Start: 2021-11-22 | End: 2021-11-22

## 2021-11-22 RX ORDER — DABIGATRAN ETEXILATE 150 MG/1
CAPSULE ORAL EVERY 12 HOURS
COMMUNITY
End: 2022-01-12 | Stop reason: ALTCHOICE

## 2021-11-22 RX ORDER — LIDOCAINE HYDROCHLORIDE 20 MG/ML
15 SOLUTION OROPHARYNGEAL AS NEEDED
Status: DISCONTINUED | OUTPATIENT
Start: 2021-11-22 | End: 2021-11-22

## 2021-11-22 RX ADMIN — FENTANYL CITRATE 25 MCG: 50 INJECTION INTRAMUSCULAR; INTRAVENOUS at 08:09

## 2021-11-22 RX ADMIN — MIDAZOLAM 1 MG: 1 INJECTION INTRAMUSCULAR; INTRAVENOUS at 08:09

## 2021-11-22 RX ADMIN — LIDOCAINE HYDROCHLORIDE 15 ML: 20 SOLUTION OROPHARYNGEAL at 08:05

## 2021-11-22 RX ADMIN — BENZOCAINE, BUTAMBEN, AND TETRACAINE HYDROCHLORIDE 1 SPRAY: .028; .004; .004 AEROSOL, SPRAY TOPICAL at 08:05

## 2021-11-22 NOTE — PROGRESS NOTES
Patient arrived to Non-Invasive Cardiology Lab for Out Patient ANGELITO Procedure. Staff introduced to patient. Patient identifiers verified with Name and Date of Birth. Procedure verified with patient. Consent forms reviewed and signed by patient or authorized representative and verified. Allergies verified. Patient informed of procedure and plan of care. Questions answered with review. Patient on cardiac monitor, non-invasive blood pressure, SPO2 monitor. On RA. Patient is A&Ox3. Patient reports no complaints. Patient on stretcher, in low position, with side rails up. Patient instructed to call for assistance as needed. Family in waiting room.

## 2021-11-22 NOTE — DISCHARGE INSTRUCTIONS
DISCHARGE SUMMARY       The following personal items collected during your admission are returned to you:   Dental Appliance:    Vision:  Glasses  Clothing:  Shirt, undergarments, jacket, mask, shoes & walker        PATIENT INSTRUCTIONS: Continue taking all the same medications as previously presicribed EXCEPT STOP ELIQUIS and START PLAVIX/CLOPIDOGREL AS DIRECTED. You had a transesophageal echocardiogram, your throat was numbed for the procedure, so start with sips of water and advance diet as swallowing returns to normal. (YOU MAY START AT 1015 AM). Avoid any scalding hot beverages for the next 4 hours. Call to make an appointment with Dr. Dev Doyle to be seen in 6 months or sooner if you have already previously scheduled an appointment. What to do at Home:  Recommended activity: No driving today, no operating heavy machinery. The discharge information has been reviewed with the PATIENT/ . The PATIENT  verbalized understanding. Cardiology Discharge Instructions                Patient ID:  Otoniel Potter  [id number]  25 y.o.  1950    Admit Date: 11/22/2021    Discharge Date: 11/22/2021   Physician: Hill Yang MD    Cardiology Procedures this Admission:  1. ANGELITO showing the Watchman implant is in good position    Disposition: home with a     Patient Instructions:    No hot food or liquids for 4 hours following the ANGELITO. Cool or warm is OK. STOP Eliquis and use clopidogrel in place (see electronic prescription). FOLLOW-UP:  Call the office [phone] to make an appointment for 6 months (or keep a sooner appointment if one is already made). [address], [address]    [phone]  [place], [address] [place] [address]    [email or web address]    Thank you for placing your trust for your heart with the physicians and staff of [facility] We have long provided [place] with the most advanced cardiovascular care possible using a personalized and caring approach.  And we hope to continue this strong tradition well into the future.     Signed:  Yadi Montoya MD  11/22/2021

## 2021-12-20 RX ORDER — TRIAMCINOLONE ACETONIDE 1 MG/G
CREAM TOPICAL
Qty: 15 G | Refills: 3 | Status: SHIPPED | OUTPATIENT
Start: 2021-12-20 | End: 2022-08-15 | Stop reason: SDUPTHER

## 2022-01-09 DIAGNOSIS — F41.9 ANXIETY: ICD-10-CM

## 2022-01-10 RX ORDER — CLONAZEPAM 0.5 MG/1
TABLET ORAL
Qty: 90 TABLET | Refills: 0 | Status: SHIPPED | OUTPATIENT
Start: 2022-01-10 | End: 2022-04-08

## 2022-01-12 ENCOUNTER — OFFICE VISIT (OUTPATIENT)
Dept: INTERNAL MEDICINE CLINIC | Age: 72
End: 2022-01-12
Payer: MEDICARE

## 2022-01-12 VITALS
TEMPERATURE: 98.1 F | SYSTOLIC BLOOD PRESSURE: 126 MMHG | DIASTOLIC BLOOD PRESSURE: 70 MMHG | BODY MASS INDEX: 41.06 KG/M2 | WEIGHT: 261.6 LBS | HEART RATE: 80 BPM | RESPIRATION RATE: 16 BRPM | OXYGEN SATURATION: 95 % | HEIGHT: 67 IN

## 2022-01-12 DIAGNOSIS — I50.22 SYSTOLIC CHF, CHRONIC (HCC): Chronic | ICD-10-CM

## 2022-01-12 DIAGNOSIS — E78.00 HYPERCHOLESTEROLEMIA: Chronic | ICD-10-CM

## 2022-01-12 DIAGNOSIS — I48.20 CHRONIC ATRIAL FIBRILLATION (HCC): Chronic | ICD-10-CM

## 2022-01-12 DIAGNOSIS — E66.9 OBESITY (BMI 30-39.9): Chronic | ICD-10-CM

## 2022-01-12 DIAGNOSIS — I25.5 ISCHEMIC CARDIOMYOPATHY: Chronic | ICD-10-CM

## 2022-01-12 DIAGNOSIS — E11.40 TYPE 2 DIABETES MELLITUS WITH DIABETIC NEUROPATHY, WITHOUT LONG-TERM CURRENT USE OF INSULIN (HCC): Primary | Chronic | ICD-10-CM

## 2022-01-12 DIAGNOSIS — I25.118 CORONARY ARTERY DISEASE OF NATIVE HEART WITH STABLE ANGINA PECTORIS, UNSPECIFIED VESSEL OR LESION TYPE (HCC): ICD-10-CM

## 2022-01-12 DIAGNOSIS — F41.9 ANXIETY: Chronic | ICD-10-CM

## 2022-01-12 PROBLEM — I25.10 CAD (CORONARY ARTERY DISEASE): Chronic | Status: ACTIVE | Noted: 2018-11-13

## 2022-01-12 LAB
ALBUMIN SERPL-MCNC: 3.7 G/DL (ref 3.5–5)
ALBUMIN/GLOB SERPL: 1.2 {RATIO} (ref 1.1–2.2)
ALP SERPL-CCNC: 143 U/L (ref 45–117)
ALT SERPL-CCNC: 18 U/L (ref 12–78)
ANION GAP SERPL CALC-SCNC: 7 MMOL/L (ref 5–15)
AST SERPL-CCNC: 12 U/L (ref 15–37)
BASOPHILS # BLD: 0 K/UL (ref 0–0.1)
BASOPHILS NFR BLD: 0 % (ref 0–1)
BILIRUB SERPL-MCNC: 0.6 MG/DL (ref 0.2–1)
BUN SERPL-MCNC: 26 MG/DL (ref 6–20)
BUN/CREAT SERPL: 17 (ref 12–20)
CALCIUM SERPL-MCNC: 9.7 MG/DL (ref 8.5–10.1)
CHLORIDE SERPL-SCNC: 108 MMOL/L (ref 97–108)
CHOLEST SERPL-MCNC: 103 MG/DL
CO2 SERPL-SCNC: 29 MMOL/L (ref 21–32)
CREAT SERPL-MCNC: 1.51 MG/DL (ref 0.55–1.02)
DIFFERENTIAL METHOD BLD: ABNORMAL
EOSINOPHIL # BLD: 0.3 K/UL (ref 0–0.4)
EOSINOPHIL NFR BLD: 5 % (ref 0–7)
ERYTHROCYTE [DISTWIDTH] IN BLOOD BY AUTOMATED COUNT: 17 % (ref 11.5–14.5)
EST. AVERAGE GLUCOSE BLD GHB EST-MCNC: 120 MG/DL
GLOBULIN SER CALC-MCNC: 3.1 G/DL (ref 2–4)
GLUCOSE SERPL-MCNC: 154 MG/DL (ref 65–100)
HBA1C MFR BLD: 5.8 % (ref 4–5.6)
HCT VFR BLD AUTO: 40.3 % (ref 35–47)
HDLC SERPL-MCNC: 35 MG/DL
HDLC SERPL: 2.9 {RATIO} (ref 0–5)
HGB BLD-MCNC: 11.9 G/DL (ref 11.5–16)
IMM GRANULOCYTES # BLD AUTO: 0 K/UL (ref 0–0.04)
IMM GRANULOCYTES NFR BLD AUTO: 0 % (ref 0–0.5)
LDLC SERPL CALC-MCNC: 38.6 MG/DL (ref 0–100)
LYMPHOCYTES # BLD: 1 K/UL (ref 0.8–3.5)
LYMPHOCYTES NFR BLD: 14 % (ref 12–49)
MCH RBC QN AUTO: 29.4 PG (ref 26–34)
MCHC RBC AUTO-ENTMCNC: 29.5 G/DL (ref 30–36.5)
MCV RBC AUTO: 99.5 FL (ref 80–99)
MONOCYTES # BLD: 0.4 K/UL (ref 0–1)
MONOCYTES NFR BLD: 6 % (ref 5–13)
NEUTS SEG # BLD: 5.3 K/UL (ref 1.8–8)
NEUTS SEG NFR BLD: 75 % (ref 32–75)
NRBC # BLD: 0 K/UL (ref 0–0.01)
NRBC BLD-RTO: 0 PER 100 WBC
PLATELET # BLD AUTO: 191 K/UL (ref 150–400)
PMV BLD AUTO: 12.1 FL (ref 8.9–12.9)
POTASSIUM SERPL-SCNC: 4.3 MMOL/L (ref 3.5–5.1)
PROT SERPL-MCNC: 6.8 G/DL (ref 6.4–8.2)
RBC # BLD AUTO: 4.05 M/UL (ref 3.8–5.2)
SODIUM SERPL-SCNC: 144 MMOL/L (ref 136–145)
TRIGL SERPL-MCNC: 147 MG/DL (ref ?–150)
TSH SERPL DL<=0.05 MIU/L-ACNC: 3.28 UIU/ML (ref 0.36–3.74)
VLDLC SERPL CALC-MCNC: 29.4 MG/DL
WBC # BLD AUTO: 7.1 K/UL (ref 3.6–11)

## 2022-01-12 PROCEDURE — G8754 DIAS BP LESS 90: HCPCS | Performed by: INTERNAL MEDICINE

## 2022-01-12 PROCEDURE — G8536 NO DOC ELDER MAL SCRN: HCPCS | Performed by: INTERNAL MEDICINE

## 2022-01-12 PROCEDURE — 1101F PT FALLS ASSESS-DOCD LE1/YR: CPT | Performed by: INTERNAL MEDICINE

## 2022-01-12 PROCEDURE — G8427 DOCREV CUR MEDS BY ELIG CLIN: HCPCS | Performed by: INTERNAL MEDICINE

## 2022-01-12 PROCEDURE — 99214 OFFICE O/P EST MOD 30 MIN: CPT | Performed by: INTERNAL MEDICINE

## 2022-01-12 PROCEDURE — 1090F PRES/ABSN URINE INCON ASSESS: CPT | Performed by: INTERNAL MEDICINE

## 2022-01-12 PROCEDURE — 3046F HEMOGLOBIN A1C LEVEL >9.0%: CPT | Performed by: INTERNAL MEDICINE

## 2022-01-12 PROCEDURE — G8417 CALC BMI ABV UP PARAM F/U: HCPCS | Performed by: INTERNAL MEDICINE

## 2022-01-12 PROCEDURE — G8399 PT W/DXA RESULTS DOCUMENT: HCPCS | Performed by: INTERNAL MEDICINE

## 2022-01-12 PROCEDURE — G8752 SYS BP LESS 140: HCPCS | Performed by: INTERNAL MEDICINE

## 2022-01-12 PROCEDURE — 3017F COLORECTAL CA SCREEN DOC REV: CPT | Performed by: INTERNAL MEDICINE

## 2022-01-12 PROCEDURE — G9717 DOC PT DX DEP/BP F/U NT REQ: HCPCS | Performed by: INTERNAL MEDICINE

## 2022-01-12 PROCEDURE — 2022F DILAT RTA XM EVC RTNOPTHY: CPT | Performed by: INTERNAL MEDICINE

## 2022-01-12 RX ORDER — POTASSIUM CHLORIDE 750 MG/1
TABLET, EXTENDED RELEASE ORAL
Qty: 360 TABLET | Refills: 1 | Status: SHIPPED | OUTPATIENT
Start: 2022-01-12 | End: 2022-07-22

## 2022-01-12 NOTE — PATIENT INSTRUCTIONS
DASH Diet: Care Instructions  Your Care Instructions     The DASH diet is an eating plan that can help lower your blood pressure. DASH stands for Dietary Approaches to Stop Hypertension. Hypertension is high blood pressure. The DASH diet focuses on eating foods that are high in calcium, potassium, and magnesium. These nutrients can lower blood pressure. The foods that are highest in these nutrients are fruits, vegetables, low-fat dairy products, nuts, seeds, and legumes. But taking calcium, potassium, and magnesium supplements instead of eating foods that are high in those nutrients does not have the same effect. The DASH diet also includes whole grains, fish, and poultry. The DASH diet is one of several lifestyle changes your doctor may recommend to lower your high blood pressure. Your doctor may also want you to decrease the amount of sodium in your diet. Lowering sodium while following the DASH diet can lower blood pressure even further than just the DASH diet alone. Follow-up care is a key part of your treatment and safety. Be sure to make and go to all appointments, and call your doctor if you are having problems. It's also a good idea to know your test results and keep a list of the medicines you take. How can you care for yourself at home? Following the DASH diet  · Eat 4 to 5 servings of fruit each day. A serving is 1 medium-sized piece of fruit, ½ cup chopped or canned fruit, 1/4 cup dried fruit, or 4 ounces (½ cup) of fruit juice. Choose fruit more often than fruit juice. · Eat 4 to 5 servings of vegetables each day. A serving is 1 cup of lettuce or raw leafy vegetables, ½ cup of chopped or cooked vegetables, or 4 ounces (½ cup) of vegetable juice. Choose vegetables more often than vegetable juice. · Get 2 to 3 servings of low-fat and fat-free dairy each day. A serving is 8 ounces of milk, 1 cup of yogurt, or 1 ½ ounces of cheese. · Eat 6 to 8 servings of grains each day.  A serving is 1 slice of bread, 1 ounce of dry cereal, or ½ cup of cooked rice, pasta, or cooked cereal. Try to choose whole-grain products as much as possible. · Limit lean meat, poultry, and fish to 2 servings each day. A serving is 3 ounces, about the size of a deck of cards. · Eat 4 to 5 servings of nuts, seeds, and legumes (cooked dried beans, lentils, and split peas) each week. A serving is 1/3 cup of nuts, 2 tablespoons of seeds, or ½ cup of cooked beans or peas. · Limit fats and oils to 2 to 3 servings each day. A serving is 1 teaspoon of vegetable oil or 2 tablespoons of salad dressing. · Limit sweets and added sugars to 5 servings or less a week. A serving is 1 tablespoon jelly or jam, ½ cup sorbet, or 1 cup of lemonade. · Eat less than 2,300 milligrams (mg) of sodium a day. If you limit your sodium to 1,500 mg a day, you can lower your blood pressure even more. · Be aware that all of these are the suggested number of servings for people who eat 1,800 to 2,000 calories a day. Your recommended number of servings may be different if you need more or fewer calories. Tips for success  · Start small. Do not try to make dramatic changes to your diet all at once. You might feel that you are missing out on your favorite foods and then be more likely to not follow the plan. Make small changes, and stick with them. Once those changes become habit, add a few more changes. · Try some of the following:  ? Make it a goal to eat a fruit or vegetable at every meal and at snacks. This will make it easy to get the recommended amount of fruits and vegetables each day. ? Try yogurt topped with fruit and nuts for a snack or healthy dessert. ? Add lettuce, tomato, cucumber, and onion to sandwiches. ? Combine a ready-made pizza crust with low-fat mozzarella cheese and lots of vegetable toppings. Try using tomatoes, squash, spinach, broccoli, carrots, cauliflower, and onions. ?  Have a variety of cut-up vegetables with a low-fat dip as an appetizer instead of chips and dip. ? Sprinkle sunflower seeds or chopped almonds over salads. Or try adding chopped walnuts or almonds to cooked vegetables. ? Try some vegetarian meals using beans and peas. Add garbanzo or kidney beans to salads. Make burritos and tacos with mashed dye beans or black beans. Where can you learn more? Go to http://www.sanchez.com/  Enter H967 in the search box to learn more about \"DASH Diet: Care Instructions. \"  Current as of: April 29, 2021               Content Version: 13.0  © 8678-0516 [employer]. Care instructions adapted under license by [employer] (which disclaims liability or warranty for this information). If you have questions about a medical condition or this instruction, always ask your healthcare professional. [address] any warranty or liability for your use of this information.

## 2022-01-12 NOTE — PROGRESS NOTES
Subjective:     Mrs. Kang Goes returns to the office today in follow-up of multiple medical problems. The patient has type 2 diabetes mellitus complicated by neuropathy. She is currently managed off of medication. Blood sugars have been varying between 120 and 160 when checked in the morning fasting. She denies polyuria or polydipsia. She currently is managing her neuropathy with venlafaxine and Lyrica. It is not worsened. She is up-to-date on diabetic retinal eye exam.    Anxiety is managed with clonazepam which she takes at night for sleep. This continues to work effectively for her and allows her to perform ADLs during the day. She has had no tolerance to the medication over time. She has hypercholesterolemia currently on statin therapy she tolerates this without muscle soreness or GI upset. The patient does have a history of coronary artery disease for which she has had a previous stent. She does have an ischemic cardiomyopathy and chronic congestive heart failure. She denies PND, orthopnea, shortness of breath or any worsening lower remedy edema. She does have chronic atrial fibrillation and did undergo a watchman procedure. She remains on anticoagulation. She has had no palpitations or syncope and denies any bleeding problems or strokelike symptoms.     Past Medical History:   Diagnosis Date    Anxiety 1/22/2018    Arrhythmia     Arthritis     OA    Asthma     CAD (coronary artery disease)     Diabetes ([facility] [address]     Essential hypertension     GERD (gastroesophageal reflux disease)     Hypercholesterolemia 1/22/2018    Hypertension     Ill-defined condition     diverticulitis    Long-term use of high-risk medication 1/22/2018    Neuropathy     Obesity (BMI 30-39.9) 4/30/2019    Other ill-defined conditions(799.89)     IBS, spinal stenosis    Plantar fasciitis     Psychiatric disorder     depression, anxiety    Sleep apnea     uses CPAP    Type 2 diabetes mellitus with diabetic neuropathy, without long-term current use of insulin ([facility] [address] [address] 6/5/2016     Past Surgical History:   Procedure Laterality Date    COLONOSCOPY N/A 3/14/2018    COLONOSCOPY performed by Melissa De Oliveira MD at \[facility]\"" ENDOSCOPY    HX APPENDECTOMY      HX CHOLECYSTECTOMY  11/15/2018    HX HYSTERECTOMY      HX PACEMAKER      IR KYPHOPLASTY LUMBAR  12/22/2020    IR KYPHOPLASTY THORACIC  1/6/2021    [place] CARDIAC SURG PROCEDURE UNLIST      stent     Allergies   Allergen Reactions    Sulfa (Sulfonamide Antibiotics) Swelling    Amoxicillin Swelling     Current Outpatient Medications   Medication Sig Dispense Refill    clonazePAM (KlonoPIN) 0.5 mg tablet TAKE 1 TABLET BY MOUTH NIGHTLY . DO NOT EXCEED 1 PER 24 HOURS 90 Tablet 0    triamcinolone acetonide (KENALOG) 0.1 % topical cream APPLY A THIN FILM OF CREAM EXTERNALLY TO THE AFFECTED AREA TWICE DAILY 15 g 3    carvediloL (COREG) 25 mg tablet TAKE 1 TABLET BY MOUTH TWO (2) TIMES DAILY (WITH MEALS). 180 Tablet 1    L. acidophilus/Strept/La p-glenn (JONNIE-Q,2, PO) Take  by mouth daily. 16 billion cell cap      pantoprazole (Protonix) 40 mg tablet Take 40 mg by mouth daily.  cholecalciferol (Vitamin D3) 25 mcg (1,000 unit) cap Take  by mouth daily.  clopidogreL (PLAVIX) 75 mg tab Take 1 Tablet by mouth daily. 30 Tablet 5    torsemide (DEMADEX) 20 mg tablet TAKE 1 TABLET EVERY DAY 90 Tablet 1    acetaminophen (TYLENOL) 500 mg tablet Take 500 mg by mouth every six (6) hours as needed for Pain.  polyethylene glycol (Miralax) 17 gram packet Take 17 g by mouth daily as needed for Constipation.  potassium chloride SA (MICRO-K) 10 mEq capsule Take 10 mEq by mouth four (4) times daily.       isosorbide mononitrate ER (IMDUR) 60 mg CR tablet TAKE 1 TABLET EVERY DAY 90 Tablet 1    omeprazole (PRILOSEC) 40 mg capsule TAKE 1 CAPSULE EVERY DAY 90 Capsule 1    cyclobenzaprine (FLEXERIL) 10 mg tablet TAKE 1 TABLET BY MOUTH THREE TIMES DAILY AS NEEDED FOR MUSCLE SPASM (Patient taking differently: 10 mg three (3) times daily as needed. TAKE 1 TABLET BY MOUTH THREE TIMES DAILY AS NEEDED FOR MUSCLE SPASM) 90 Tablet 0    mupirocin (BACTROBAN) 2 % ointment Apply  to affected area daily. (Patient taking differently: by Both Nostrils route daily.) 30 g 2    nystatin (MYCOSTATIN) powder Apply  to affected area four (4) times daily. (Patient taking differently: Apply  to affected area two (2) times a day.) 1 Bottle 1    clotrimazole-betamethasone (LOTRISONE) topical cream Apply  to affected area two (2) times a day.  pregabalin (LYRICA) 300 mg capsule Take 1 Capsule by mouth two (2) times a day. Max Daily Amount: 600 mg. 1 Capsule 0    hydrALAZINE (APRESOLINE) 25 mg tablet Take 75 mg by mouth three (3) times daily. Indications: high blood pressure      ferrous sulfate 325 mg (65 mg iron) tablet Take 1 Tab by mouth every other day. 90 Tab 2    magnesium oxide (MAG-OX) 400 mg tablet Take 1 tablet by mouth twice daily (Patient taking differently: daily. ) 180 Tab 3    SYMBICORT 160-4.5 mcg/actuation HFAA INHALE 2 PUFFS BY MOUTH TWICE DAILY (Patient taking differently: 2 Puffs daily as needed.) 1 Inhaler 3    albuterol (PROVENTIL HFA, VENTOLIN HFA, PROAIR HFA) 90 mcg/actuation inhaler Take 1 Puff by inhalation every four (4) hours as needed for Wheezing. (Patient taking differently: Take 1 Puff by inhalation two (2) times daily as needed for Wheezing.) 1 Inhaler 6    sodium chloride (AYR SALINE) 0.65 % nasal squeeze bottle 2 Sprays by Both Nostrils route every eight (8) hours as needed.  conjugated estrogens (PREMARIN) 0.625 mg/gram vaginal cream Insert 0.5 g into vagina two (2) times a week. Using Daily      venlafaxine-SR (EFFEXOR XR) 150 mg capsule Take 150 mg by mouth two (2) times daily (with meals).  cholecalciferol (Vitamin D3) 25 mcg (1,000 unit) cap Take 1,000 Units by mouth daily.  aspirin 81 mg cap Take 81 mg by mouth daily.       atorvastatin (LIPITOR) 40 mg tablet Take 1 Tab by mouth nightly. 27 Tab 12     Social History     Socioeconomic History    Marital status:    Tobacco Use    Smoking status: Never Smoker    Smokeless tobacco: Never Used   Vaping Use    Vaping Use: Never used   Substance and Sexual Activity    Alcohol use: No    Drug use: No     Family History   Problem Relation Age of Onset    OSTEOARTHRITIS Mother     Hypertension Mother     High Cholesterol Mother     Crohn's Disease Mother     Heart Disease Mother     Alcohol abuse Father     High Cholesterol Sister     Hypertension Sister     Thyroid Disease Sister     COPD Sister     High Cholesterol Brother     Hypertension Brother     COPD Brother     COPD Child     Inflammatory Bowel Dz Child        Review of Systems:  GEN: no weight loss, weight gain, fatigue or night sweats  CV: Denies chest pains or palpitations  Resp: no dyspnea on exertion, no cough  Abd: no nausea, vomiting or diarrhea  EXT: Positive intermittent edema  Endocrine: no hair loss, excessive thirst or polyuria  Neurological ROS: no TIA or stroke symptoms. Positive for paresthesias of feet  ROS otherwise negative      Objective:     Visit Vitals  /70 (BP 1 Location: Right upper arm, BP Patient Position: Sitting, BP Cuff Size: Adult)   Pulse 80   Temp 98.1 °F (36.7 °C) (Oral)   Resp 16   Ht 5' 7\" (1.702 m)   Wt 261 lb 9.6 oz (118.7 kg)   SpO2 95%   BMI 40.97 kg/m²     Body mass index is 40.97 kg/m². General:   alert, cooperative and no distress   Eyes: conjunctivae/sclerae clear. PERRL, EOM's intact   Mouth:  No oral lesions, no pharyngeal erythema, no exudates   Neck: Trachea midline, no thyromegaly, no bruits   Heart: Heart rate slightly irregular with controlled ventricular response   Lungs: Clear to auscultation bilaterally, no increased work of breathing   Abdomen: Soft, nontender.   Normal bowel sounds   Extremities: No edema or cyanosis   Neuro: ..alert, oriented x3,speech normal in context and clarity, cranial nerves II-XII intact,motor strength: full proximally and distally,gait: normal  reflexes: full and symmetric     Physical exam otherwise negative         Assessment/Plan:     Diagnoses and all orders for this visit:    Type 2 diabetes mellitus with diabetic neuropathy, without long-term current use of insulin (HCC)  -     COLLECTION VENOUS BLOOD,VENIPUNCTURE  -     CBC WITH AUTOMATED DIFF; Future  -     HEMOGLOBIN A1C WITH EAG; Future  -     LIPID PANEL; Future  -     METABOLIC PANEL, COMPREHENSIVE; Future  -     MICROALBUMIN, UR, RAND W/ MICROALB/CREAT RATIO; Future  -     URINALYSIS W/ REFLEX CULTURE; Future  -     METABOLIC PANEL, COMPREHENSIVE  -     LIPID PANEL  -     HEMOGLOBIN A1C WITH EAG  -     CBC WITH AUTOMATED DIFF    Hypercholesterolemia  -     LIPID PANEL; Future  -     TSH 3RD GENERATION; Future  -     TSH 3RD GENERATION  -     LIPID PANEL    Coronary artery disease of native heart with stable angina pectoris, unspecified vessel or lesion type (HCC)    Ischemic cardiomyopathy    Systolic CHF, chronic (HCC)    Chronic atrial fibrillation (HCC)    Anxiety    Obesity (BMI 30-39. 9)        Other instructions: The patient's medications were reviewed and reconciled. No change in her current medical regimen will be made. A no added salt, prudent diet is encouraged    Continue to check blood sugars once daily    Weight loss recommended with body mass index of 44    Patient is due to mammography done and she will schedule    Await results of multiple labs    Follow-up in 6 months    Follow-up and Dispositions    · Return in about 6 months (around 7/12/2022). Sheila Rodriguez MD    Please note that this dictation was completed with [employer]wlient, the computer voice recognition software. Quite often unanticipated grammatical, syntax, homophones, and other interpretive errors are inadvertently transcribed by the computer software. Please disregard these errors.   Please excuse any errors that have escaped final proofreading.

## 2022-01-12 NOTE — PROGRESS NOTES
Anuradha Freeman is a 73 y.o. female presenting for Follow Up Chronic Condition (6 mo fu)  . 1. Have you been to the ER, urgent care clinic since your last visit? Hospitalized since your last visit? No    2. Have you seen or consulted any other health care providers outside of the [address] [place] [address] since your last visit? Include any pap smears or colon screening. Nephrologist, Cardiologist, Ortho    Fall Risk Assessment, last 12 mths 1/12/2022   Able to walk? Yes   Fall in past 12 months? 0   Do you feel unsteady? 0   Are you worried about falling 0   Number of falls in past 12 months -   Fall with injury? -         Abuse Screening Questionnaire 1/12/2022   Do you ever feel afraid of your partner? N   Are you in a relationship with someone who physically or mentally threatens you? N   Is it safe for you to go home? Y       3 most recent PHQ Screens 7/8/2021   Little interest or pleasure in doing things Not at all   Feeling down, depressed, irritable, or hopeless Not at all   Total Score PHQ 2 0   Trouble falling or staying asleep, or sleeping too much Not at all   Feeling tired or having little energy Not at all   Poor appetite, weight loss, or overeating Not at all   Feeling bad about yourself - or that you are a failure or have let yourself or your family down Not at all   Trouble concentrating on things such as school, work, reading, or watching TV Not at all   Moving or speaking so slowly that other people could have noticed; or the opposite being so fidgety that others notice Not at all   Thoughts of being better off dead, or hurting yourself in some way Not at all   PHQ 9 Score 0   How difficult have these problems made it for you to do your work, take care of your home and get along with others Not difficult at all       There are no discontinued medications.

## 2022-01-13 ENCOUNTER — PATIENT MESSAGE (OUTPATIENT)
Dept: INTERNAL MEDICINE CLINIC | Age: 72
End: 2022-01-13

## 2022-01-13 DIAGNOSIS — Z12.31 ENCOUNTER FOR SCREENING MAMMOGRAM FOR MALIGNANT NEOPLASM OF BREAST: Primary | ICD-10-CM

## 2022-01-13 LAB
APPEARANCE UR: ABNORMAL
BACTERIA URNS QL MICRO: ABNORMAL /HPF
BILIRUB UR QL: NEGATIVE
COLOR UR: ABNORMAL
CREAT UR-MCNC: 101 MG/DL
EPITH CASTS URNS QL MICRO: ABNORMAL /LPF
GLUCOSE UR STRIP.AUTO-MCNC: NEGATIVE MG/DL
HGB UR QL STRIP: NEGATIVE
HYALINE CASTS URNS QL MICRO: ABNORMAL /LPF (ref 0–5)
KETONES UR QL STRIP.AUTO: NEGATIVE MG/DL
LEUKOCYTE ESTERASE UR QL STRIP.AUTO: ABNORMAL
MICROALBUMIN UR-MCNC: 2.34 MG/DL
MICROALBUMIN/CREAT UR-RTO: 23 MG/G (ref 0–30)
NITRITE UR QL STRIP.AUTO: NEGATIVE
PH UR STRIP: 5.5 [PH] (ref 5–8)
PROT UR STRIP-MCNC: ABNORMAL MG/DL
RBC #/AREA URNS HPF: ABNORMAL /HPF (ref 0–5)
SP GR UR REFRACTOMETRY: 1.02 (ref 1–1.03)
UA: UC IF INDICATED,UAUC: ABNORMAL
UROBILINOGEN UR QL STRIP.AUTO: 0.2 EU/DL (ref 0.2–1)
WBC URNS QL MICRO: ABNORMAL /HPF (ref 0–4)

## 2022-01-13 NOTE — TELEPHONE ENCOUNTER
Spoke to patient- she would like an order placed to have mammogram done at [id number] [facility].

## 2022-01-13 NOTE — TELEPHONE ENCOUNTER
From: Dawit García  To: Roger Estrella MD  Sent: 1/13/2022 9:00 AM EST  Subject: Mamogram    Where do I go to take Mamogram?

## 2022-01-16 LAB
BACTERIA SPEC CULT: ABNORMAL
CC UR VC: ABNORMAL
SERVICE CMNT-IMP: ABNORMAL

## 2022-01-17 RX ORDER — NITROFURANTOIN 25; 75 MG/1; MG/1
100 CAPSULE ORAL 2 TIMES DAILY
Qty: 14 CAPSULE | Refills: 0 | Status: SHIPPED | OUTPATIENT
Start: 2022-01-17 | End: 2022-07-22

## 2022-02-07 RX ORDER — NYSTATIN 100000 [USP'U]/G
POWDER TOPICAL
Qty: 15 G | Refills: 0 | Status: SHIPPED | OUTPATIENT
Start: 2022-02-07 | End: 2022-03-14

## 2022-03-01 NOTE — PROGRESS NOTES
1900 - Bedside report from [facility]. Up in chair, no pain or complaints. Up w walker to bathroom and back to chair, did well. Afib and Paced on monitor. Received patient at 1. Patient A/O x 3. NSR on tele. O2 saturation 95% on RA. No C/O chest pain or SOB. Patient voiding with no issue, purweick in place. R nephrostomy tube noted. Initially draining clear, cherry red fluid but now is clear, yellow urine. R ileostomy noted as well, draining well. Stoma intact. Dressing and bag C/D/I. Bed is locked and in low position. Call light and personal items within reach.  All needs met at this time

## 2022-03-14 RX ORDER — NYSTATIN 100000 [USP'U]/G
POWDER TOPICAL
Qty: 15 G | Refills: 0 | Status: SHIPPED | OUTPATIENT
Start: 2022-03-14 | End: 2022-05-16 | Stop reason: SDUPTHER

## 2022-03-18 PROBLEM — L60.0 INGROWN TOENAIL OF LEFT FOOT: Status: ACTIVE | Noted: 2019-12-17

## 2022-03-18 PROBLEM — I25.10 CAD (CORONARY ARTERY DISEASE): Status: ACTIVE | Noted: 2018-11-13

## 2022-03-18 PROBLEM — J45.909 ASTHMA: Status: ACTIVE | Noted: 2019-01-31

## 2022-03-18 PROBLEM — S81.802A OPEN WOUND OF LEFT LOWER LEG: Status: ACTIVE | Noted: 2021-03-31

## 2022-03-18 PROBLEM — G93.41 METABOLIC ENCEPHALOPATHY: Status: ACTIVE | Noted: 2021-06-01

## 2022-03-18 PROBLEM — F41.9 ANXIETY: Status: ACTIVE | Noted: 2018-01-22

## 2022-03-18 PROBLEM — I25.5 ISCHEMIC CARDIOMYOPATHY: Status: ACTIVE | Noted: 2018-11-13

## 2022-03-18 PROBLEM — J96.90 RESPIRATORY FAILURE (HCC): Status: ACTIVE | Noted: 2020-08-09

## 2022-03-18 PROBLEM — Z79.899 POLYPHARMACY: Status: ACTIVE | Noted: 2021-05-19

## 2022-03-19 PROBLEM — E78.00 HYPERCHOLESTEROLEMIA: Status: ACTIVE | Noted: 2018-01-22

## 2022-03-19 PROBLEM — E66.9 OBESITY (BMI 30-39.9): Status: ACTIVE | Noted: 2019-04-30

## 2022-03-19 PROBLEM — Z79.899 LONG-TERM USE OF HIGH-RISK MEDICATION: Status: ACTIVE | Noted: 2018-01-22

## 2022-03-19 PROBLEM — E16.2 HYPOGLYCEMIA: Status: ACTIVE | Noted: 2021-01-01

## 2022-03-19 PROBLEM — S22.41XD CLOSED FRACTURE OF MULTIPLE RIBS OF RIGHT SIDE WITH ROUTINE HEALING: Status: ACTIVE | Noted: 2021-08-31

## 2022-03-19 PROBLEM — M54.50 LOWER BACK PAIN: Status: ACTIVE | Noted: 2020-12-13

## 2022-03-19 PROBLEM — Z95.818 PRESENCE OF WATCHMAN LEFT ATRIAL APPENDAGE CLOSURE DEVICE: Status: ACTIVE | Noted: 2021-10-06

## 2022-03-19 PROBLEM — E87.6 HYPOKALEMIA: Status: ACTIVE | Noted: 2018-10-07

## 2022-03-19 PROBLEM — Z95.0 S/P PLACEMENT OF CARDIAC PACEMAKER: Status: ACTIVE | Noted: 2018-11-13

## 2022-03-19 PROBLEM — R53.83 LETHARGY: Status: ACTIVE | Noted: 2021-01-01

## 2022-03-19 PROBLEM — R60.0 LOWER EXTREMITY EDEMA: Status: ACTIVE | Noted: 2019-12-17

## 2022-03-19 PROBLEM — E11.21 TYPE 2 DIABETES WITH NEPHROPATHY (HCC): Status: ACTIVE | Noted: 2020-04-30

## 2022-03-19 PROBLEM — R09.02 HYPOXIA: Status: ACTIVE | Noted: 2020-12-13

## 2022-03-19 PROBLEM — J45.901 ACUTE ASTHMA EXACERBATION: Status: ACTIVE | Noted: 2019-08-17

## 2022-03-19 PROBLEM — I48.91 ATRIAL FIBRILLATION (HCC): Status: ACTIVE | Noted: 2021-10-06

## 2022-03-19 PROBLEM — N18.30 STAGE 3 CHRONIC KIDNEY DISEASE (HCC): Status: ACTIVE | Noted: 2018-11-13

## 2022-03-19 PROBLEM — I87.2 VENOUS STASIS DERMATITIS OF BOTH LOWER EXTREMITIES: Status: ACTIVE | Noted: 2019-12-17

## 2022-03-19 PROBLEM — K81.1 CHRONIC CHOLECYSTITIS: Status: ACTIVE | Noted: 2018-11-16

## 2022-03-20 PROBLEM — R10.13 EPIGASTRIC ABDOMINAL PAIN: Status: ACTIVE | Noted: 2018-11-13

## 2022-03-31 ENCOUNTER — HOSPITAL ENCOUNTER (OUTPATIENT)
Dept: MAMMOGRAPHY | Age: 72
Discharge: HOME | End: 2022-03-31
Attending: INTERNAL MEDICINE
Payer: MEDICARE

## 2022-03-31 DIAGNOSIS — Z12.31 ENCOUNTER FOR SCREENING MAMMOGRAM FOR MALIGNANT NEOPLASM OF BREAST: ICD-10-CM

## 2022-03-31 PROCEDURE — 77067 SCR MAMMO BI INCL CAD: CPT

## 2022-04-04 ENCOUNTER — TELEPHONE (OUTPATIENT)
Dept: INTERNAL MEDICINE CLINIC | Age: 72
End: 2022-04-04

## 2022-04-04 NOTE — TELEPHONE ENCOUNTER
Patient states she has her mammogram on 3/31/22. She would like the results. Please call.      [phone]

## 2022-04-04 NOTE — TELEPHONE ENCOUNTER
There are no results posted for anything done on 3/31 at this time in the [employer] system. They generally will contact her with the results.

## 2022-04-07 ENCOUNTER — TELEPHONE (OUTPATIENT)
Dept: INTERNAL MEDICINE CLINIC | Age: 72
End: 2022-04-07

## 2022-04-07 NOTE — TELEPHONE ENCOUNTER
Patient's  called for the patient regarding the results of her mammogram that was completed a week ago.

## 2022-04-08 DIAGNOSIS — F41.9 ANXIETY: ICD-10-CM

## 2022-04-08 RX ORDER — CLONAZEPAM 0.5 MG/1
TABLET ORAL
Qty: 90 TABLET | Refills: 0 | Status: SHIPPED | OUTPATIENT
Start: 2022-04-08 | End: 2022-07-08 | Stop reason: SDUPTHER

## 2022-04-25 RX ORDER — ISOSORBIDE MONONITRATE 60 MG/1
TABLET, EXTENDED RELEASE ORAL
Qty: 90 TABLET | Refills: 0 | Status: SHIPPED | OUTPATIENT
Start: 2022-04-25 | End: 2022-04-26 | Stop reason: SDUPTHER

## 2022-04-25 RX ORDER — CARVEDILOL 25 MG/1
25 TABLET ORAL 2 TIMES DAILY WITH MEALS
Qty: 180 TABLET | Refills: 0 | Status: SHIPPED | OUTPATIENT
Start: 2022-04-25 | End: 2022-04-26 | Stop reason: SDUPTHER

## 2022-04-25 RX ORDER — OMEPRAZOLE 40 MG/1
40 CAPSULE, DELAYED RELEASE ORAL DAILY
Qty: 90 CAPSULE | Refills: 0 | Status: SHIPPED | OUTPATIENT
Start: 2022-04-25 | End: 2022-04-26 | Stop reason: SDUPTHER

## 2022-04-25 NOTE — TELEPHONE ENCOUNTER
PCP: Pasquale Camejo MD    Last appt: 1/12/2022  Future Appointments   Date Time Provider Tammy Perales   4/26/2022  7:30 AM [facility] 2 [facility]   4/26/2022  8:00 AM [facility]   7/22/2022  8:00 AM Augustine Freeman MD [facility] BS AMB       Last refilled:3/2/22    Requested Prescriptions     Pending Prescriptions Disp Refills    omeprazole (PRILOSEC) 40 mg capsule 90 Capsule 0     Sig: Take 1 Capsule by mouth daily.  carvediloL (COREG) 25 mg tablet 180 Tablet 0     Sig: Take 1 Tablet by mouth two (2) times daily (with meals).

## 2022-04-26 ENCOUNTER — HOSPITAL ENCOUNTER (OUTPATIENT)
Dept: ULTRASOUND IMAGING | Age: 72
Discharge: HOME | End: 2022-04-26
Attending: INTERNAL MEDICINE
Payer: MEDICARE

## 2022-04-26 ENCOUNTER — HOSPITAL ENCOUNTER (OUTPATIENT)
Dept: MAMMOGRAPHY | Age: 72
Discharge: HOME | End: 2022-04-26
Attending: INTERNAL MEDICINE
Payer: MEDICARE

## 2022-04-26 DIAGNOSIS — R92.8 ABNORMAL MAMMOGRAM: ICD-10-CM

## 2022-04-26 PROCEDURE — 77061 BREAST TOMOSYNTHESIS UNI: CPT

## 2022-04-26 PROCEDURE — 76642 ULTRASOUND BREAST LIMITED: CPT

## 2022-04-26 RX ORDER — OMEPRAZOLE 40 MG/1
40 CAPSULE, DELAYED RELEASE ORAL DAILY
Qty: 90 CAPSULE | Refills: 1 | Status: SHIPPED | OUTPATIENT
Start: 2022-04-26

## 2022-04-26 RX ORDER — ISOSORBIDE MONONITRATE 60 MG/1
TABLET, EXTENDED RELEASE ORAL
Qty: 90 TABLET | Refills: 1 | Status: SHIPPED | OUTPATIENT
Start: 2022-04-26 | End: 2022-10-03 | Stop reason: SDUPTHER

## 2022-04-26 RX ORDER — CARVEDILOL 25 MG/1
25 TABLET ORAL 2 TIMES DAILY WITH MEALS
Qty: 180 TABLET | Refills: 1 | Status: SHIPPED | OUTPATIENT
Start: 2022-04-26

## 2022-04-26 NOTE — TELEPHONE ENCOUNTER
Requested Prescriptions     Pending Prescriptions Disp Refills    isosorbide mononitrate ER (IMDUR) 60 mg CR tablet 90 Tablet 1     Sig: TAKE 1 TABLET EVERY DAY    omeprazole (PRILOSEC) 40 mg capsule 90 Capsule 1     Sig: Take 1 Capsule by mouth daily.  carvediloL (COREG) 25 mg tablet 180 Tablet 1     Sig: Take 1 Tablet by mouth two (2) times daily (with meals). Last Refill: 4/25/22  Next Appointment:7/22/22    Please send to THE [facility]. Please resend.

## 2022-05-10 ENCOUNTER — HOSPITAL ENCOUNTER (OUTPATIENT)
Dept: MAMMOGRAPHY | Age: 72
Discharge: HOME | End: 2022-05-10
Attending: INTERNAL MEDICINE
Payer: MEDICARE

## 2022-05-10 VITALS
SYSTOLIC BLOOD PRESSURE: 135 MMHG | HEIGHT: 67 IN | TEMPERATURE: 98.2 F | WEIGHT: 256 LBS | RESPIRATION RATE: 20 BRPM | OXYGEN SATURATION: 98 % | BODY MASS INDEX: 40.18 KG/M2 | DIASTOLIC BLOOD PRESSURE: 84 MMHG | HEART RATE: 80 BPM

## 2022-05-10 DIAGNOSIS — R92.8 ABNORMAL MAMMOGRAM OF RIGHT BREAST: ICD-10-CM

## 2022-05-10 PROCEDURE — 74011000250 HC RX REV CODE- 250: Performed by: INTERNAL MEDICINE

## 2022-05-10 PROCEDURE — A4648 IMPLANTABLE TISSUE MARKER: HCPCS

## 2022-05-10 PROCEDURE — 77065 DX MAMMO INCL CAD UNI: CPT

## 2022-05-10 PROCEDURE — 88305 TISSUE EXAM BY PATHOLOGIST: CPT

## 2022-05-10 PROCEDURE — C1819 TISSUE LOCALIZATION-EXCISION: HCPCS

## 2022-05-10 PROCEDURE — 77030014115

## 2022-05-10 PROCEDURE — 2709999900 HC NON-CHARGEABLE SUPPLY

## 2022-05-10 PROCEDURE — 19081 BX BREAST 1ST LESION STRTCTC: CPT

## 2022-05-10 PROCEDURE — 77030013689 HC GD NDL EVIVA HOLO -A

## 2022-05-10 RX ORDER — LIDOCAINE HYDROCHLORIDE 10 MG/ML
4 INJECTION, SOLUTION EPIDURAL; INFILTRATION; INTRACAUDAL; PERINEURAL
Status: COMPLETED | OUTPATIENT
Start: 2022-05-10 | End: 2022-05-10

## 2022-05-10 RX ORDER — LIDOCAINE HYDROCHLORIDE 10 MG/ML
INJECTION, SOLUTION EPIDURAL; INFILTRATION; INTRACAUDAL; PERINEURAL
Status: DISPENSED
Start: 2022-05-10 | End: 2022-05-10

## 2022-05-10 RX ORDER — LIDOCAINE HYDROCHLORIDE AND EPINEPHRINE 10; 10 MG/ML; UG/ML
1.5 INJECTION, SOLUTION INFILTRATION; PERINEURAL ONCE
Status: COMPLETED | OUTPATIENT
Start: 2022-05-10 | End: 2022-05-10

## 2022-05-10 RX ADMIN — LIDOCAINE HYDROCHLORIDE 6 ML: 10 INJECTION, SOLUTION EPIDURAL; INFILTRATION; INTRACAUDAL; PERINEURAL at 11:23

## 2022-05-10 RX ADMIN — LIDOCAINE HYDROCHLORIDE AND EPINEPHRINE 8 ML: 10; 10 INJECTION, SOLUTION INFILTRATION; PERINEURAL at 11:24

## 2022-05-10 NOTE — PROGRESS NOTES
1045 - pt ambulated to [facility]. With assistance of her rollator accomp,. By nurse for her post right breast biopsy  Mammogram.  Dressing noted right outer, upper breast clean/dry/intact without bleeding, swelling or pain. 1050 - right breast sample to gross room via nurse and signed into lab log. 1100 - pt still noted in  Mammogram dept. - pt tolerating mammograms without difficulty. 1110 - I have reviewed discharge instructions with the patient. The patient verbalized understanding. Copy of discharge instructions per AVS copied, reviewed with pt., signature sheet signed and sent to med. Rec. For scanning r/t penpad not working and copy to pt. Prior to discharge. Dressing right upper, outer breast (0900 site) noted clean/dry/intact without bleeding, swelling, pain. Ice pack applied to right breast biopsy site prior to discharge. Pt stable and dressed self. Pt ok'd per Dr. Chantell Tapia for discharge following mammogram.  Pt ambulated with assistance of her rollator to waiting room for discharge to home via private vehicle with her  driving her home. Pt accomp. By nurse for discharge.

## 2022-05-10 NOTE — DISCHARGE INSTRUCTIONS
[address]  [phone]      Breast Biopsy Discharge Instructions          1. After the biopsy, we will place a clean covered ice pack over the biopsy site, within the bra - you should leave the ice pack on 30 minutes and then remove the ice pack for 1-2 hours until bedtime. If needed you can continue applying ice the following day. It is a good idea to wear your bra for support, both day and night unless this causes you discomfort. 2. You may take Tylenol (two tablets) every 4 to 6 hours as needed for pain. Do not take aspirin or aspirin products (e.g. ibuprofen, Advil, Motrin) as these may cause more bleeding. 3. You may expect some bruising and skin discoloration in the biopsy area. This is normal and generally should resolve in 5 to 7 days. 4. Most women do not find it necessary to restrict their activities after the procedure. You should rest as needed on the day of your biopsy. The next day, if you are feeling okay, you may resume your regular work/activity schedule. Avoid strenuous activity and heavy lifting, jogging, aerobics, or vacuuming for 48 hours after the procedure. 5. 48 hours after your biopsy, remove the large outer dressing and leave the steri-strips (tiny pieces of tape) in place. The steri-strips will usually fall off in a few days. You may shower 48 hours after your biopsy and you may get the steri-strips wet. If still present after 4 days, you may gently peel the strips off. Keep the area clean and dry and shower daily. 6. If you have bleeding from the incision area, hold firm pressure on the area for 20 minutes. This should control any slight oozing that might occur.   If you develop persistent bleeding or pain which does not respond to the above measures or if you develop a fever, excessive swelling, redness, heat or drainage, please call the Breast Health Navigator at [phone] during normal business hours (7 a.m. - 5 p.m.). After business hours, call [phone] and ask for the on-call Radiology Nurse to be paged, or your referring physician. 7. You will receive your biopsy results (pathology report) in 3-5 business days - the radiologist will review your results and you will receive a call from the radiology department and/or from your doctor.           Physician :  Dr. Denise Randall                                                                 Nurse:  Spencer Menendez RN       Tech: ______________                                                                       Zaire Hoffmann: ______________________________

## 2022-05-10 NOTE — ROUTINE PROCESS
Pt received in stereo room sitting on rollator(pt.'s) with her . Pt awake, alert and oriented x 3. Pt noted without c/o pain. Pt noted pacemaker/defibrillator inserted left chest 6 years ago, history of heart attack and she has the watchman left chest also. Pt currently on plavix and currently taking aspirin and plavix and Dr. Diego Corea aware.

## 2022-05-12 ENCOUNTER — TELEPHONE (OUTPATIENT)
Dept: MAMMOGRAPHY | Age: 72
End: 2022-05-12

## 2022-05-12 NOTE — TELEPHONE ENCOUNTER
Dr. Lane Marcelino reviewed pathology report and recommended, 1 year routine mammogram follow up. The patient was notified of benign results and recommendation for a 1 year routine mammogram follow up. Faith Dooley was advised the pathology report is available in [facility].

## 2022-05-13 NOTE — PROGRESS NOTES
Please contact this patient to let them know that the results of this recent test was unremarkable. Please have them schedule routine follow-up with me as necessary. Informed patient. F/up as scheduled.

## 2022-05-16 RX ORDER — NYSTATIN 100000 [USP'U]/G
POWDER TOPICAL 4 TIMES DAILY
Qty: 15 G | Refills: 0 | Status: SHIPPED | OUTPATIENT
Start: 2022-05-16 | End: 2022-07-07 | Stop reason: SDUPTHER

## 2022-05-16 NOTE — TELEPHONE ENCOUNTER
PCP: Vani Lockwood MD    Last appt: 1/12/2022  Future Appointments   Date Time Provider Tammy Perales   7/22/2022  8:00 AM Vani Lockwood MD [facility] AMB       Requested Prescriptions     Pending Prescriptions Disp Refills    nystatin (Nystop) powder 15 g 0         Other Comments:

## 2022-05-16 NOTE — TELEPHONE ENCOUNTER
PCP: Kam Mccurdy MD    Last appt: 1/12/2022  Future Appointments   Date Time Provider Tammy Perales   7/22/2022  8:00 AM Kam Mccurdy MD [facility] AMB       Requested Prescriptions     Pending Prescriptions Disp Refills    nystatin (Nystop) powder 15 g 0       Prior labs and Blood pressures:  BP Readings from Last 3 Encounters:   05/10/22 135/84   01/12/22 126/70   11/22/21 (!) 150/88     Lab Results   Component Value Date/Time    Sodium 144 01/12/2022 08:50 AM    Potassium 4.3 01/12/2022 08:50 AM    Chloride 108 01/12/2022 08:50 AM    CO2 29 01/12/2022 08:50 AM    Anion gap 7 01/12/2022 08:50 AM    Glucose 154 (H) 01/12/2022 08:50 AM    BUN 26 (H) 01/12/2022 08:50 AM    Creatinine 1.51 (H) 01/12/2022 08:50 AM    BUN/Creatinine ratio 17 01/12/2022 08:50 AM    GFR est AA 41 (L) 01/12/2022 08:50 AM    GFR est non-AA 34 (L) 01/12/2022 08:50 AM    Calcium 9.7 01/12/2022 08:50 AM

## 2022-05-18 RX ORDER — NYSTATIN 100000 [USP'U]/G
POWDER TOPICAL 4 TIMES DAILY
Qty: 15 G | Refills: 0 | Status: SHIPPED | OUTPATIENT
Start: 2022-05-18 | End: 2022-06-17

## 2022-05-18 RX ORDER — NYSTATIN 100000 [USP'U]/G
POWDER TOPICAL 4 TIMES DAILY
Qty: 15 G | Refills: 0 | Status: CANCELLED | OUTPATIENT
Start: 2022-05-18 | End: 2022-06-17

## 2022-05-18 RX ORDER — CLOPIDOGREL BISULFATE 75 MG/1
75 TABLET ORAL DAILY
Qty: 30 TABLET | Refills: 5 | Status: SHIPPED | OUTPATIENT
Start: 2022-05-18 | End: 2022-10-03

## 2022-05-18 NOTE — TELEPHONE ENCOUNTER
Patient called and stated medication was sent to the wrong pharmacy.  She would like it to go to [facility]

## 2022-05-18 NOTE — TELEPHONE ENCOUNTER
PCP: Rolly Lugo MD    Last appt: 1/12/2022  Future Appointments   Date Time Provider Tammy Perales   7/22/2022  8:00 AM Rolly Lugo MD [facility] BS AMB       Requested Prescriptions     Pending Prescriptions Disp Refills    clopidogreL (PLAVIX) 75 mg tab 30 Tablet 5     Sig: Take 1 Tablet by mouth daily.        Prior labs and Blood pressures:  BP Readings from Last 3 Encounters:   05/10/22 135/84   01/12/22 126/70   11/22/21 (!) 150/88     Lab Results   Component Value Date/Time    Sodium 144 01/12/2022 08:50 AM    Potassium 4.3 01/12/2022 08:50 AM    Chloride 108 01/12/2022 08:50 AM    CO2 29 01/12/2022 08:50 AM    Anion gap 7 01/12/2022 08:50 AM    Glucose 154 (H) 01/12/2022 08:50 AM    BUN 26 (H) 01/12/2022 08:50 AM    Creatinine 1.51 (H) 01/12/2022 08:50 AM    BUN/Creatinine ratio 17 01/12/2022 08:50 AM    GFR est AA 41 (L) 01/12/2022 08:50 AM    GFR est non-AA 34 (L) 01/12/2022 08:50 AM    Calcium 9.7 01/12/2022 08:50 AM     Lab Results   Component Value Date/Time    Hemoglobin A1c 5.8 (H) 01/12/2022 08:50 AM    Hemoglobin A1c (POC) 6.1 (A) 01/22/2018 01:42 PM     Lab Results   Component Value Date/Time    Cholesterol, total 103 01/12/2022 08:50 AM    Cholesterol (POC) 138.0 01/22/2018 01:42 PM    HDL Cholesterol 35 01/12/2022 08:50 AM    HDL Cholesterol (POC) 40.0 01/22/2018 01:42 PM    LDL Cholesterol (POC) 50.4 01/22/2018 01:42 PM    LDL, calculated 38.6 01/12/2022 08:50 AM    VLDL, calculated 29.4 01/12/2022 08:50 AM    Triglyceride 147 01/12/2022 08:50 AM    Triglycerides (POC) 238.0 (A) 01/22/2018 01:42 PM    CHOL/HDL Ratio 2.9 01/12/2022 08:50 AM     No results found for: Stephy Greenhouse, [id number]    Lab Results   Component Value Date/Time    TSH 3.28 01/12/2022 08:50 AM    TSH, 3rd generation 2.75 03/08/2021 10:56 AM

## 2022-06-28 RX ORDER — TORSEMIDE 20 MG/1
TABLET ORAL
Qty: 90 TABLET | Refills: 3 | Status: SHIPPED | OUTPATIENT
Start: 2022-06-28

## 2022-06-28 NOTE — TELEPHONE ENCOUNTER
PCP: Erik Nye MD    Last appt: 2022  Future Appointments   Date Time Provider Tammy Perales   2022  8:00 AM Erik Nye MD [facility] AMB       Requested Prescriptions     Pending Prescriptions Disp Refills    torsemide (DEMADEX) 20 mg tablet 90 Tablet 0     Si Tablet daily.        Prior labs and Blood pressures:  BP Readings from Last 3 Encounters:   05/10/22 135/84   22 126/70   21 (!) 150/88     Lab Results   Component Value Date/Time    Sodium 144 2022 08:50 AM    Potassium 4.3 2022 08:50 AM    Chloride 108 2022 08:50 AM    CO2 29 2022 08:50 AM    Anion gap 7 2022 08:50 AM    Glucose 154 (H) 2022 08:50 AM    BUN 26 (H) 2022 08:50 AM    Creatinine 1.51 (H) 2022 08:50 AM    BUN/Creatinine ratio 17 2022 08:50 AM    GFR est AA 41 (L) 2022 08:50 AM    GFR est non-AA 34 (L) 2022 08:50 AM    Calcium 9.7 2022 08:50 AM     Lab Results   Component Value Date/Time    Hemoglobin A1c 5.8 (H) 2022 08:50 AM    Hemoglobin A1c (POC) 6.1 (A) 2018 01:42 PM     Lab Results   Component Value Date/Time    Cholesterol, total 103 2022 08:50 AM    Cholesterol (POC) 138.0 2018 01:42 PM    HDL Cholesterol 35 2022 08:50 AM    HDL Cholesterol (POC) 40.0 2018 01:42 PM    LDL Cholesterol (POC) 50.4 2018 01:42 PM    LDL, calculated 38.6 2022 08:50 AM    VLDL, calculated 29.4 2022 08:50 AM    Triglyceride 147 2022 08:50 AM    Triglycerides (POC) 238.0 (A) 2018 01:42 PM    CHOL/HDL Ratio 2.9 2022 08:50 AM     No results found for: KATHY Pedro    Lab Results   Component Value Date/Time    TSH 3.28 2022 08:50 AM    TSH, 3rd generation 2.75 2021 10:56 AM

## 2022-07-07 RX ORDER — NYSTATIN 100000 [USP'U]/G
POWDER TOPICAL 4 TIMES DAILY
Qty: 15 G | Refills: 0 | Status: SHIPPED | OUTPATIENT
Start: 2022-07-07 | End: 2022-08-06

## 2022-07-07 NOTE — TELEPHONE ENCOUNTER
PCP: Bay Nesbitt MD    Last appt: 1/12/2022  Future Appointments   Date Time Provider Tammy Perales   7/22/2022  8:00 AM Bay Nesbitt MD PCA BS AMB       Requested Prescriptions     Pending Prescriptions Disp Refills    nystatin (Nystop) powder 15 g 0     Sig: Apply  to affected area four (4) times daily for 30 days.        Prior labs and Blood pressures:  BP Readings from Last 3 Encounters:   05/10/22 135/84   01/12/22 126/70   11/22/21 (!) 150/88     Lab Results   Component Value Date/Time    Sodium 144 01/12/2022 08:50 AM    Potassium 4.3 01/12/2022 08:50 AM    Chloride 108 01/12/2022 08:50 AM    CO2 29 01/12/2022 08:50 AM    Anion gap 7 01/12/2022 08:50 AM    Glucose 154 (H) 01/12/2022 08:50 AM    BUN 26 (H) 01/12/2022 08:50 AM    Creatinine 1.51 (H) 01/12/2022 08:50 AM    BUN/Creatinine ratio 17 01/12/2022 08:50 AM    GFR est AA 41 (L) 01/12/2022 08:50 AM    GFR est non-AA 34 (L) 01/12/2022 08:50 AM    Calcium 9.7 01/12/2022 08:50 AM     Lab Results   Component Value Date/Time    Hemoglobin A1c 5.8 (H) 01/12/2022 08:50 AM    Hemoglobin A1c (POC) 6.1 (A) 01/22/2018 01:42 PM     Lab Results   Component Value Date/Time    Cholesterol, total 103 01/12/2022 08:50 AM    Cholesterol (POC) 138.0 01/22/2018 01:42 PM    HDL Cholesterol 35 01/12/2022 08:50 AM    HDL Cholesterol (POC) 40.0 01/22/2018 01:42 PM    LDL Cholesterol (POC) 50.4 01/22/2018 01:42 PM    LDL, calculated 38.6 01/12/2022 08:50 AM    VLDL, calculated 29.4 01/12/2022 08:50 AM    Triglyceride 147 01/12/2022 08:50 AM    Triglycerides (POC) 238.0 (A) 01/22/2018 01:42 PM    CHOL/HDL Ratio 2.9 01/12/2022 08:50 AM     No results found for: Claudette Abbot, [id number]    Lab Results   Component Value Date/Time    TSH 3.28 01/12/2022 08:50 AM    TSH, 3rd generation 2.75 03/08/2021 10:56 AM

## 2022-07-08 DIAGNOSIS — F41.9 ANXIETY: ICD-10-CM

## 2022-07-08 NOTE — TELEPHONE ENCOUNTER
PCP: Jack Sanchez MD    Last appt: 1/12/2022  Future Appointments   Date Time Provider Tammy Perales   7/22/2022  8:00 AM Jack Sanchez MD PCAM BS AMB       Requested Prescriptions     Pending Prescriptions Disp Refills    clonazePAM (KlonoPIN) 0.5 mg tablet 90 Tablet 0       Prior labs and Blood pressures:  BP Readings from Last 3 Encounters:   05/10/22 135/84   01/12/22 126/70   11/22/21 (!) 150/88     Lab Results   Component Value Date/Time    Sodium 144 01/12/2022 08:50 AM    Potassium 4.3 01/12/2022 08:50 AM    Chloride 108 01/12/2022 08:50 AM    CO2 29 01/12/2022 08:50 AM    Anion gap 7 01/12/2022 08:50 AM    Glucose 154 (H) 01/12/2022 08:50 AM    BUN 26 (H) 01/12/2022 08:50 AM    Creatinine 1.51 (H) 01/12/2022 08:50 AM    BUN/Creatinine ratio 17 01/12/2022 08:50 AM    GFR est AA 41 (L) 01/12/2022 08:50 AM    GFR est non-AA 34 (L) 01/12/2022 08:50 AM    Calcium 9.7 01/12/2022 08:50 AM     Lab Results   Component Value Date/Time    Hemoglobin A1c 5.8 (H) 01/12/2022 08:50 AM    Hemoglobin A1c (POC) 6.1 (A) 01/22/2018 01:42 PM     Lab Results   Component Value Date/Time    Cholesterol, total 103 01/12/2022 08:50 AM    Cholesterol (POC) 138.0 01/22/2018 01:42 PM    HDL Cholesterol 35 01/12/2022 08:50 AM    HDL Cholesterol (POC) 40.0 01/22/2018 01:42 PM    LDL Cholesterol (POC) 50.4 01/22/2018 01:42 PM    LDL, calculated 38.6 01/12/2022 08:50 AM    VLDL, calculated 29.4 01/12/2022 08:50 AM    Triglyceride 147 01/12/2022 08:50 AM    Triglycerides (POC) 238.0 (A) 01/22/2018 01:42 PM    CHOL/HDL Ratio 2.9 01/12/2022 08:50 AM     No results found for: KATHY Lyle    Lab Results   Component Value Date/Time    TSH 3.28 01/12/2022 08:50 AM    TSH, 3rd generation 2.75 03/08/2021 10:56 AM

## 2022-07-11 RX ORDER — CLONAZEPAM 0.5 MG/1
TABLET ORAL
Qty: 90 TABLET | Refills: 0 | Status: SHIPPED | OUTPATIENT
Start: 2022-07-11 | End: 2022-10-10

## 2022-07-15 NOTE — MR AVS SNAPSHOT
[address] [place] [address] [address] [id number] [phone] Patient: Mushtaq Sung MRN: [id number] Visit Information Date & Time Provider Department Dept. Phone Encounter #  
 8/1/2018 10:00 AM Kar Mahoney [id number] [phone] [id number] Follow-up Instructions Return in about 6 months (around 2/1/2019). Upcoming Health Maintenance Date Due Hepatitis C Screening 1950 FOOT EXAM Q1 9/13/1962 EYE EXAM RETINAL OR DILATED Q1 9/13/1962 DTaP/Tdap/Td series (1 - Tdap) 9/13/1973 BREAST CANCER SCRN MAMMOGRAM 9/13/2002 FOBT Q 1 YEAR AGE 50-75 9/13/2002 ZOSTER VACCINE AGE 60> 7/13/2012 GLAUCOMA SCREENING Q2Y 9/13/2017 Bone Densitometry (Dexa) Screening 9/13/2017 Pneumococcal 65+ Low/Medium Risk (1 of 2 - PCV13) 9/13/2017 MEDICARE YEARLY EXAM 3/14/2018 HEMOGLOBIN A1C Q6M 7/22/2018 Influenza Age 5 to Adult 8/1/2018 MICROALBUMIN Q1 1/22/2019 LIPID PANEL Q1 1/22/2019 Allergies as of 8/1/2018  Review Complete On: 8/1/2018 By: Courtney Sotomayor MD  
  
 Severity Noted Reaction Type Reactions Sulfa (Sulfonamide Antibiotics) High 05/14/2010   Side Effect Swelling Amoxicillin  07/17/2014    Swelling Current Immunizations  Reviewed on 5/14/2010 Name Date Influenza High Dose Vaccine PF 9/20/2017 12:00 AM  
 Influenza Vaccine 10/1/2016 Pneumococcal Polysaccharide (PPSV-23)  Deferred (Patient Refused) Not reviewed this visit You Were Diagnosed With   
  
 Codes Comments Type 2 diabetes mellitus with diabetic neuropathy, without long-term current use of insulin (HCC)    -  Primary ICD-10-CM: E11.40 ICD-9-CM: 250.60, 357.2 Diabetic ulcer of left midfoot associated with type 2 diabetes mellitus, unspecified ulcer stage ([facility] [address]     ICD-10-CM: E11.621, O82.531 ICD-9-CM: 250.80, 707.14   
 Essential hypertension     ICD-10-CM: I10 
ICD-9-CM: 401.9 Hypercholesterolemia     ICD-10-CM: E78.00 ICD-9-CM: 272.0 Long-term use of high-risk medication     ICD-10-CM: Z79.899 ICD-9-CM: V58.69 Anxiety     ICD-10-CM: F41.9 ICD-9-CM: 300.00 Chronic atrial fibrillation (HCC)     ICD-10-CM: U74.6 ICD-9-CM: 427.31 Vitals BP Pulse Height(growth percentile) Weight(growth percentile) SpO2 BMI  
 124/80 (!) 51 5' 9\" (1.753 m) 238 lb (108 kg) 96% 35.15 kg/m2 OB Status Smoking Status Hysterectomy Never Smoker BMI and BSA Data Body Mass Index Body Surface Area  
 35.15 kg/m 2 2.29 m 2 Preferred Pharmacy Pharmacy Name Phone [place] [facility], [place] - [address] [place] [address] [phone] Your Updated Medication List  
  
   
This list is accurate as of 8/1/18 10:27 AM.  Always use your most recent med list. amLODIPine 5 mg tablet Commonly known as:  [place] TAKE 1 TABLET EVERY DAY  
  
 apixaban 5 mg tablet Commonly known as:  Princella Gardiner Take 1 Tab by mouth two (2) times a day. aspirin 81 mg chewable tablet Take 81 mg by mouth daily. atorvastatin 40 mg tablet Commonly known as:  LIPITOR Take 1 Tab by mouth nightly. bumetanide 1 mg tablet Commonly known as:  Vilma Puri Take 1 Tab by mouth daily. Take 1 tablet by mouth in the morning on Tuesday and Thursday, 2 tablets on Monday, Wednesday, and Friday. CALCIUM 600 + D 600-125 mg-unit Tab Generic drug:  calcium-cholecalciferol (d3) Take  by mouth.  
  
 clonazePAM 0.5 mg tablet Commonly known as:  KlonoPIN  
TAKE 1 TABLET EVERY NIGHT. MAX DAILY DOSE OF 0.5MG. lisinopril 20 mg tablet Commonly known as:  Tabatha Wilburn Take 1 Tab by mouth daily. LYRICA 100 mg capsule Generic drug:  pregabalin TAKE ONE CAPSULE BY MOUTH 3 TIMES A DAY  
  
 magnesium 250 mg Tab Take  by mouth. metoprolol succinate 50 mg XL tablet Commonly known as:  TOPROL-XL Take  by mouth daily. potassium gluconate 550 mg (90 mg) Tab Take  by mouth. PREMARIN 0.625 mg/gram vaginal cream  
Generic drug:  conjugated estrogens Insert 0.5 g into vagina daily. venlafaxine 75 mg tablet Commonly known as:  EFFEXOR  
TAKE 2 TABLETS TWICE A DAY  
  
 VITAMIN D3 1,000 unit Cap Generic drug:  cholecalciferol Take  by mouth. We Performed the Following AMB POC COMPLETE CBC,AUTOMATED ENTER Z5780008 CPT(R)] AMB POC URINALYSIS DIP STICK AUTO W/ MICRO  [60092 CPT(R)] AMB POC URINE, MICROALBUMIN, SEMIQUANT (1 RESULT) [73281 CPT(R)] AMB SUPPLY ORDER [[id number] Custom] Comments:  
 Diabetic shoes and inserts Dx: Type II diabetes with neuropathy. Code: E11.40 CK [id number] CPT(R)] COLLECTION VENOUS BLOOD,VENIPUNCTURE C4181949 CPT(R)] HEMOGLOBIN A1C W/O EAG [17514 CPT(R)] LIPID PANEL [69105 CPT(R)] METABOLIC PANEL, COMPREHENSIVE [80324 CPT(R)] TSH 3RD GENERATION [02672 CPT(R)] Follow-up Instructions Return in about 6 months (around 2/1/2019). Patient Instructions Diabetes Foot Health: Care Instructions Your Care Instructions When you have diabetes, your feet need extra care and attention. Diabetes can damage the nerve endings and blood vessels in your feet, making you less likely to notice when your feet are injured. Diabetes also limits your body's ability to fight infection and get blood to areas that need it. If you get a minor foot injury, it could become an ulcer or a serious infection. With good foot care, you can prevent most of these problems. Caring for your feet can be quick and easy. Most of the care can be done when you are bathing or getting ready for bed. Follow-up care is a key part of your treatment and safety.  Be sure to make and go to all appointments, and call your doctor if you are having problems. It's also a good idea to know your test results and keep a list of the medicines you take. How can you care for yourself at home? · Keep your blood sugar close to normal by watching what and how much you eat, monitoring blood sugar, taking medicines if prescribed, and getting regular exercise. · Do not smoke. Smoking affects blood flow and can make foot problems worse. If you need help quitting, talk to your doctor about stop-smoking programs and medicines. These can increase your chances of quitting for good. · Eat a diet that is low in fats. High fat intake can cause fat to build up in your blood vessels and decrease blood flow. · Inspect your feet daily for blisters, cuts, cracks, or sores. If you cannot see well, use a mirror or have someone help you. · Take care of your feet: 
[facility] your feet every day. Use warm (not hot) water. Check the water temperature with your wrists or other part of your body, not your feet. ¨ Dry your feet well. Pat them dry. Do not rub the skin on your feet too hard. Dry well between your toes. If the skin on your feet stays moist, bacteria or a fungus can grow, which can lead to infection. ¨ Keep your skin soft. Use moisturizing skin cream to keep the skin on your feet soft and prevent calluses and cracks. But do not put the cream between your toes, and stop using any cream that causes a rash. ¨ Clean underneath your toenails carefully. Do not use a sharp object to clean underneath your toenails. Use the blunt end of a nail file or other rounded tool. ¨ Trim and file your toenails straight across to prevent ingrown toenails. Use a nail clipper, not scissors. Use an emery board to smooth the edges. · Change socks daily. Socks without seams are best, because seams often rub the feet. You can find socks for people with diabetes from specialty catalogs. · Look inside your shoes every day for things like gravel or torn linings, which could cause blisters or sores. · Buy shoes that fit well: 
¨ Look for shoes that have plenty of space around the toes. This helps prevent bunions and blisters. ¨ Try on shoes while wearing the kind of socks you will usually wear with the shoes. ¨ Avoid plastic shoes. They may rub your feet and cause blisters. Good shoes should be made of materials that are flexible and breathable, such as leather or cloth. ¨ Break in new shoes slowly by wearing them for no more than an hour a day for several days. Take extra time to check your feet for red areas, blisters, or other problems after you wear new shoes. · Do not go barefoot. Do not wear sandals, and do not wear shoes with very thin soles. Thin soles are easy to puncture. They also do not protect your feet from hot pavement or cold weather. · Have your doctor check your feet during each visit. If you have a foot problem, see your doctor. Do not try to treat an early foot problem at home. Home remedies or treatments that you can buy without a prescription (such as corn removers) can be harmful. · Always get early treatment for foot problems. A minor irritation can lead to a major problem if not properly cared for early. When should you call for help? Call your doctor now or seek immediate medical care if: 
  · You have a foot sore, an ulcer or break in the skin that is not healing after 4 days, bleeding corns or calluses, or an ingrown toenail.  
  · You have blue or black areas, which can mean bruising or blood flow problems.  
  · You have peeling skin or tiny blisters between your toes or cracking or oozing of the skin.  
  · You have a fever for more than 24 hours and a foot sore.  
  · You have new numbness or tingling in your feet that does not go away after you move your feet or change positions.  
  · You have unexplained or unusual swelling of the foot or ankle.  
 Watch closely for changes in your health, and be sure to contact your doctor if: 
  · You cannot do proper foot care. Where can you learn more? Go to http://dawood-vera.info/. Enter A739 in the search box to learn more about \"Diabetes Foot Health: Care Instructions. \" Current as of: December 7, 2017 Content Version: 11.7 © 5043-6432 [employer]. Care instructions adapted under license by [employer] (which disclaims liability or warranty for this information). If you have questions about a medical condition or this instruction, always ask your healthcare professional. [address] any warranty or liability for your use of this information. Learning About Cutting Calories How do calories affect your weight? Food gives your body energy. Energy from the food you eat is measured in calories. This energy keeps your heart beating, your brain active, and your muscles working. Your body needs a certain number of calories each day. After your body uses the calories it needs, it stores extra calories as fat. To lose weight safely, you have to eat fewer calories while eating in a healthy way. How many calories do you need each day? The more active you are, the more calories you need. When you are less active, you need fewer calories. How many calories you need each day also depends on several things, including your age and whether you are male or female. Here are some general guidelines for adults: 
· Less active women and older adults need 1,600 to 2,000 calories each day. · Active women and less active men need 2,000 to 2,400 calories each day. · Active men need 2,400 to 3,000 calories each day. How can you cut calories and eat healthy meals? Whole grains, vegetables and fruits, and dried beans are good lower-calorie foods. They give you lots of nutrients and fiber. And they fill you up. Sweets, energy drinks, and soda pop are high in calories. They give you few nutrients and no fiber.  Try to limit soda pop, fruit juice, and energy drinks. Drink water instead. Some fats can be part of a healthy diet. But cutting back on fats from highly processed foods like fast foods and many snack foods is a good way to lower the calories in your diet. Also, use smaller amounts of fats like butter, margarine, salad dressing, and mayonnaise. Add fresh garlic, lemon, or herbs to your meals to add flavor without adding fat. Meats and dairy products can be a big source of hidden fats. Try to choose lean or low-fat versions of these products. Fat-free cookies, candies, chips, and frozen treats can still be high in sugar and calories. Some fat-free foods have more calories than regular ones. Eat fat-free treats in moderation, as you would other foods. If your favorite foods are high in fat, salt, sugar, or calories, limit how often you eat them. Eat smaller servings, or look for healthy substitutes. Fill up on fruits, vegetables, and whole grains. Eating at home · Use meat as a side dish instead of as the main part of your meal. 
· Try main dishes that use whole wheat pasta, brown rice, dried beans, or vegetables. · Find ways to cook with little or no fat, such as broiling, steaming, or grilling. · Use cooking spray instead of oil. If you use oil, use a monounsaturated oil, such as canola or olive oil. · Trim fat from meats before you cook them. · Drain off fat after you brown the meat or while you roast it. · Chill soups and stews after you cook them. Then skim the fat off the top after it hardens. Eating out · Order foods that are broiled or poached rather than fried or breaded. · Cut back on the amount of butter or margarine that you use on bread. · Order sauces, gravies, and salad dressings on the side, and use only a little. · When you order pasta, choose tomato sauce rather than cream sauce. · Ask for salsa with your baked potato instead of sour cream, butter, cheese, or beltran. · Order meals in a small size instead of upgrading to a large. · Share an entree, or take part of your food home to eat as another meal. 
· Share appetizers and desserts. Where can you learn more? Go to http://dawood-vera.info/. Enter 99 [id number] in the search box to learn more about \"Learning About Cutting Calories. \" Current as of: May 12, 2017 Content Version: 11.7 © 3930-4684 [employer]. Care instructions adapted under license by [employer] (which disclaims liability or warranty for this information). If you have questions about a medical condition or this instruction, always ask your healthcare professional. [place] [address] any warranty or liability for your use of this information. Introducing [facility]! [employer] introduces [employer] patient portal. Now you can access parts of your medical record, email your doctor's office, and request medication refills online. 1. In your internet browser, go to [email or web address] 2. Click on the First Time User? Click Here link in the Sign In box. You will see the New Member Sign Up page. 3. Enter your [employer] Access Code exactly as it appears below. You will not need to use this code after youve completed the sign-up process. If you do not sign up before the expiration date, you must request a new code. · [employer] Access Code: [email or web address] Expires: 10/30/2018  9:35 AM 
 
4. Enter the last four digits of your Social Security Number (xxxx) and Date of Birth (mm/dd/yyyy) as indicated and click Submit. You will be taken to the next sign-up page. 5. Create a Lagout ID. This will be your [employer] login ID and cannot be changed, so think of one that is secure and easy to remember. 6. Create a [employer] password. You can change your password at any time. 7. Enter your Password Reset Question and Answer. This can be used at a later time if you forget your password. 8. Enter your e-mail address. You will receive e-mail notification when new information is available in [address] 9. Click Sign Up. You can now view and download portions of your medical record. 10. Click the Download Summary menu link to download a portable copy of your medical information. If you have questions, please visit the Frequently Asked Questions section of the Mindie website. Remember, Mindie is NOT to be used for urgent needs. For medical emergencies, dial 911. Now available from your iPhone and Android! Please provide this summary of care documentation to your next provider. Your primary care clinician is listed as MARIO Valentine 21. If you have any questions after today's visit, please call [phone]. PRE-OP DIAGNOSIS:  Acute appendicitis 15-Jul-2022 18:05:44  Delia Bower

## 2022-07-20 PROBLEM — F32.A ANXIETY AND DEPRESSION: Status: ACTIVE | Noted: 2018-01-22

## 2022-07-20 PROBLEM — N18.32 CKD STAGE G3B/A1, GFR 30-44 AND ALBUMIN CREATININE RATIO <30 MG/G (HCC): Status: ACTIVE | Noted: 2018-11-13

## 2022-07-20 PROBLEM — F41.9 ANXIETY AND DEPRESSION: Status: ACTIVE | Noted: 2018-01-22

## 2022-07-20 NOTE — PROGRESS NOTES
Subjective:     Chief Complaint   Patient presents with    [facility]         Lawrence Mir is a 73 y.o. F.  She has a past medical history of coronary artery disease, diabetes, and hypertension, among other problems. I reviewed the medical record. The patient was seen by her primary care provider most recently in January for routine follow-up. She was noted to have type 2 diabetes mellitus that was being controlled with diet alone. She was using clonazepam for her anxiety to help her sleep. She was also noted to have a history of chronic atrial fibrillation, status post previous watchman placement. She was still on anticoagulation at the time. Physical examination at the time was notable for severe morbid obesity with a BMI of 40.1 kg/m². No changes were made to her medication regimen, and she was referred for routine laboratory testing. Over the past several months, the patient has undergone serial mammography for evaluation of abnormal imaging with her most recent mammogram performed in mid May. Stereotactic biopsy with [facility] [place] clip placement was performed. This showed only benign proliferative fibrocystic changes with stromal calcifications. Today, the patient comes in accompanied by her  for routine follow-up on her chronic medical concerns, a Medicare wellness visit, and establishment. Together, they report that there has been no significant clinical change over the past 6 months since her last visit. She reports feeling generally well overall today and her only somatic complaint relates to a sensation of a stickiness and discharge on her tongue that has developed over the past few weeks. She describes a white discharge on the base of her tongue with a sensation of stickiness when trying to talk. She denies any dysphagia or odynophagia, and denies any postnasal drip. Physical examination reveals a white discharge on the top of her tongue, suggestive of thrush.     The patient otherwise reports feeling well. She does not take any medications currently for her diabetes, with her last A1c in January being only 5.8%; she had been on Amaryl before at the direction of her previous primary care provider. She continues on atorvastatin 40 mg daily for her history of hyperlipidemia, and reports tolerating this well without any muscle aches or GI symptoms. The patient is noted to have a history of congestive heart failure with systolic dysfunction, and is currently followed by cardiology for this. She has a pacemaker in place. She also has a history of atrial fibrillation, status post watchman placement. She denies having had any changes in her exercise tolerance over the past few months, although she is usually quite sedentary. She uses a Rollator when outside and a cane at home from time to time. She denies any chest pain, worsening orthopnea, or any lower extremity edema, although she does use compression stockings most of the time. Her weight overall has been stable. The patient is noted to have a history of mild persistent asthma for which she takes both Symbicort and albuterol. She says that she has not had to resort to her albuterol inhaler very much over the past few months, although on further questioning it seems that she is using the albuterol on a daily basis and the Symbicort as needed. She denies having had any worsening shortness of breath or wheezing. The patient has a history of hypertension, in the setting of her heart failure. She continues on Imdur and hydralazine combination for this; she takes hydralazine 75 mg 3 times daily. With this, her blood pressure readings are reported to be typically in the 110/70 mmHg range overall; she notes that she had just taken her hydralazine just prior to today's visit. She denies any lightheadedness or dizziness or any other focal neurological symptoms. No recent falls or fainting spells.     Finally, the patient has a history of anxiety for which she continues on venlafaxine. She also uses Klonopin on a regular basis for this. She notes that she has had some difficulty with waking up in the middle the night after taking the clonazepam, which she has been on for several years. She wonders if she could be getting dependent upon the medication. She denies any overt anxiety or depression at this time. No suicidal or homicidal ideation. No abuse concerns. Routine Healthcare Maintenance issues are reviewed and discussed with the patient as noted below. Orders to update gaps in healthcare maintenance were placed as noted below in the Assessment and Plan, where applicable. She is notably due for routine eye examination for her diabetes. We also discussed her numerous vaccines that are due; she previously had PCV 13 vaccination but is due for Pneumovax or PCV 20. We also discussed Shingrix and tetanus vaccines, and noted that she is also due for a foot examination but referrals were placed as noted below. Medicare Wellness Questions: Patient lives at home and is not completely independent with regard to activities and instrumental activities of daily living. Patient does not drink alcohol to excess. Patient denies recent problems with memory, vision, hearing, balance or gait. Ambulates with difficulty. No abuse concerns. Patient denies recent depression or anxiety concerns. Patient is using safety equipment in the home.       Past Medical History:  Past Medical History:   Diagnosis Date    Age-related osteoporosis without current pathological fracture     Anxiety and depression 01/22/2018    Arthritis     OA    Asthma     CAD (coronary artery disease)     Chronic systolic heart failure (HCC)     CKD stage G3b/A1, GFR 30-44 and albumin creatinine ratio <30 mg/g (HCC)     Essential hypertension     GERD (gastroesophageal reflux disease)     History of diverticulitis     diverticulitis    History of echocardiogram 11/2021    LV: Estimated LVEF is 40 - 45%. Visually measured ejection fraction. Normal cavity size and wall thickness. Globally reduced systolic function. LA: Moderately dilated left atrium. Left atrial appendage is completely occluded. A Watchman left atrial appendage occluder is present and completely occludes the appendage. History of migraine     Hypercholesterolemia 01/22/2018    Irritable bowel syndrome     IBS, spinal stenosis    Long-term use of high-risk medication 01/22/2018    Lumbar spinal stenosis     Morbid (severe) obesity due to excess calories (HCC)     Neuropathy     Obesity (BMI 30-39.9) 04/30/2019    Obstructive sleep apnea     uses CPAP    Permanent atrial fibrillation (HCC)     Presence of implantable cardioverter-defibrillator (ICD)     Presence of Watchman left atrial appendage closure device     Type 2 diabetes mellitus with diabetic neuropathy, without long-term current use of insulin ([facility] [address] 06/05/2016    Venous insufficiency     Vitamin B12 deficiency        Past Surgical Histor:  Past Surgical History:   Procedure Laterality Date    COLONOSCOPY N/A 3/14/2018    COLONOSCOPY performed by Som Mancini MD at hospitals ENDOSCOPY    HX APPENDECTOMY      HX BREAST BIOPSY Left     about 4-5 years ago from 2022, neg    HX CHOLECYSTECTOMY  11/15/2018    HX HYSTERECTOMY      HX PACEMAKER      IR KYPHOPLASTY LUMBAR  12/22/2020    IR KYPHOPLASTY THORACIC  1/6/2021    DC CARDIAC SURG PROCEDURE UNLIST      stent       Allergies: Allergies   Allergen Reactions    Sulfa (Sulfonamide Antibiotics) Swelling    Amoxicillin Swelling       Medications:  Current Outpatient Medications   Medication Sig Dispense Refill    nystatin (MYCOSTATIN) 100,000 unit/mL suspension Take 5 mL by mouth four (4) times daily for 10 days. swish and spit 200 mL 0    clonazePAM (KlonoPIN) 0.5 mg tablet Take 1 tablet by mouth nightly 90 Tablet 0    nystatin (Nystop) powder Apply  to affected area four (4) times daily for 30 days.  15 g 0    torsemide (DEMADEX) 20 mg tablet TAKE 1 TABLET by mouth EVERY DAY 90 Tablet 3    isosorbide mononitrate ER (IMDUR) 60 mg CR tablet TAKE 1 TABLET EVERY DAY 90 Tablet 1    omeprazole (PRILOSEC) 40 mg capsule Take 1 Capsule by mouth daily. 90 Capsule 1    carvediloL (COREG) 25 mg tablet Take 1 Tablet by mouth two (2) times daily (with meals). 180 Tablet 1    triamcinolone acetonide (KENALOG) 0.1 % topical cream APPLY A THIN FILM OF CREAM EXTERNALLY TO THE AFFECTED AREA TWICE DAILY 15 g 3    L. acidophilus/Strept/La p-glenn (JONNIE-Q,2, PO) Take  by mouth daily. 16 billion cell cap      cholecalciferol (VITAMIN D3) 25 mcg (1,000 unit) cap Take  by mouth daily. acetaminophen (TYLENOL) 500 mg tablet Take 500 mg by mouth every six (6) hours as needed for Pain.      polyethylene glycol (MIRALAX) 17 gram packet Take 17 g by mouth daily as needed for Constipation. clotrimazole-betamethasone (LOTRISONE) topical cream Apply  to affected area two (2) times a day. hydrALAZINE (APRESOLINE) 25 mg tablet Take 75 mg by mouth three (3) times daily. Indications: high blood pressure      magnesium oxide (MAG-OX) 400 mg tablet Take 1 tablet by mouth twice daily (Patient taking differently: daily. ) 180 Tab 3    SYMBICORT 160-4.5 mcg/actuation HFAA INHALE 2 PUFFS BY MOUTH TWICE DAILY (Patient taking differently: 2 Puffs daily as needed.) 1 Inhaler 3    albuterol (PROVENTIL HFA, VENTOLIN HFA, PROAIR HFA) 90 mcg/actuation inhaler Take 1 Puff by inhalation every four (4) hours as needed for Wheezing. (Patient taking differently: Take 1 Puff by inhalation two (2) times daily as needed for Wheezing.) 1 Inhaler 6    sodium chloride (OCEAN) 0.65 % nasal squeeze bottle 2 Sprays by Both Nostrils route every eight (8) hours as needed. conjugated estrogens (PREMARIN) 0.625 mg/gram vaginal cream Insert 0.5 g into vagina two (2) times a week.  Using Daily      venlafaxine-SR (EFFEXOR-XR) 150 mg capsule Take 150 mg by mouth two (2) times daily (with meals). cholecalciferol (VITAMIN D3) 25 mcg (1,000 unit) cap Take 1,000 Units by mouth daily. aspirin 81 mg cap Take 81 mg by mouth daily. atorvastatin (LIPITOR) 40 mg tablet Take 1 Tab by mouth nightly. 30 Tab 12    clopidogreL (PLAVIX) 75 mg tab Take 1 Tablet by mouth daily.  30 Tablet 5    potassium chloride (KLOR-CON M10) 10 mEq tablet TAKE 1 TABLET FOUR TIMES DAILY 360 Tablet 1       Social History:  Social History     Socioeconomic History    Marital status:    Tobacco Use    Smoking status: Never    Smokeless tobacco: Never   Vaping Use    Vaping Use: Never used   Substance and Sexual Activity    Alcohol use: No    Drug use: No       Family History:  Family History   Problem Relation Age of Onset    OSTEOARTHRITIS Mother     Hypertension Mother     High Cholesterol Mother     Crohn's Disease Mother     Heart Disease Mother     Alcohol abuse Father     High Cholesterol Sister     Hypertension Sister     Thyroid Disease Sister     COPD Sister     High Cholesterol Brother     Hypertension Brother     COPD Brother     COPD Child     Inflammatory Bowel Dz Child        Immunizations:  Immunization History   Administered Date(s) Administered    COVID-19, PFIZER PURPLE top, DILUTE for use, (age 15 y+), IM, 30mcg/0.3mL 03/13/2021, 04/03/2021, 11/01/2021    Influenza High Dose Vaccine PF 09/20/2017, 10/11/2019, 11/01/2021    Influenza Vaccine 10/01/2016    Influenza Vaccine (Quad) PF (>6 Mo Flulaval, Fluarix, and >3 Yrs Afluria, Fluzone [id number]) 10/17/2018    Influenza, High-dose, Quadrivalent (>65 Yrs Fluzone High Dose Quad [id number]) 10/20/2020    Pneumococcal Conjugate (PCV-13) 10/11/2019        Healthcare Maintenance:  Health Maintenance   Topic Date Due    DTaP/Tdap/Td series (1 - Tdap) Never done    Shingrix Vaccine Age 50> (1 of 2) Never done    Pneumococcal 65+ years (2 - PPSV23 or PCV20) 10/11/2020    Foot Exam Q1  10/28/2021    Eye Exam Retinal or Dilated  12/11/2021 COVID-19 Vaccine (4 - Booster for Pfizer series) 03/01/2022    Flu Vaccine (1) 09/01/2022    A1C test (Diabetic or Prediabetic)  01/12/2023    Lipid Screen  01/12/2023    MICROALBUMIN Q1  01/13/2023    Depression Monitoring  07/22/2023    Medicare Yearly Exam  07/23/2023    Breast Cancer Screen Mammogram  05/10/2024    Colorectal Cancer Screening Combo  03/14/2028    Hepatitis C Screening  Completed    Bone Densitometry (Dexa) Screening  Completed        Review of Systems:  ROS:  Review of Systems   Constitutional: Negative. HENT:  Positive for sore throat. Eyes: Negative. Respiratory: Negative. Cardiovascular: Negative. Gastrointestinal: Negative. Genitourinary: Negative. Musculoskeletal: Negative. Skin: Negative. Neurological: Negative. Endo/Heme/Allergies: Negative. Psychiatric/Behavioral: Negative. ROS otherwise negative      Objective:     Vital Signs:  Visit Vitals  /60   Pulse 80   Temp 98.8 °F (37.1 °C) (Oral)   Ht 5' 7\" (1.702 m)   Wt 263 lb 4.8 oz (119.4 kg)   SpO2 94%   BMI 41.24 kg/m²       BMI:  Body mass index is 41.24 kg/m². Physical Examination:  Physical Exam  Constitutional:       Appearance: Normal appearance. She is obese. HENT:      Head: Normocephalic and atraumatic. Right Ear: External ear normal.      Left Ear: External ear normal.      Nose: Nose normal.      Mouth/Throat:      Mouth: Mucous membranes are moist.      Pharynx: Oropharyngeal exudate (white exudate on tongue) present. No posterior oropharyngeal erythema. Cardiovascular:      Rate and Rhythm: Normal rate and regular rhythm. Pulses: Normal pulses. Heart sounds: Normal heart sounds. No murmur heard. No friction rub. No gallop. Comments: Pacemaker left upper chest  Pulmonary:      Effort: Pulmonary effort is normal. No respiratory distress. Breath sounds: Normal breath sounds. No wheezing, rhonchi or rales. Abdominal:      General: Abdomen is flat. Bowel sounds are normal. There is no distension. Palpations: Abdomen is soft. Tenderness: no abdominal tenderness There is no guarding. Musculoskeletal:         General: No swelling, tenderness or deformity. Normal range of motion. Cervical back: Normal range of motion and neck supple. No rigidity or tenderness. Right lower leg: Edema (trace) present. Left lower leg: Edema (trace) present. Skin:     General: Skin is warm and dry. Findings: No erythema, lesion or rash. Comments: Chronic venostasis changes BL LE's   Neurological:      General: No focal deficit present. Mental Status: She is alert and oriented to person, place, and time. Mental status is at baseline. Sensory: No sensory deficit. Motor: No weakness. Gait: Gait abnormal (using rollator). Deep Tendon Reflexes: Reflexes normal.   Psychiatric:         Mood and Affect: Mood normal.         Behavior: Behavior normal.         Judgment: Judgment normal.        Physical exam otherwise negative    Diagnostic Testing:    Laboratory Studies:  Office Visit on 01/12/2022   Component Date Value Ref Range Status    TSH 01/12/2022 3.28  0.36 - 3.74 uIU/mL Final    Comment:   Due to TSH heterogeneity, both structurally and degree of glycosylation,  monoclonal antibodies used in the TSH assay may not accurately quantitate TSH. Therefore, this result should be correlated with clinical findings as well as  with other assessments of thyroid function, e.g., free T4, free T3.       Sodium 01/12/2022 144  136 - 145 mmol/L Final    Potassium 01/12/2022 4.3  3.5 - 5.1 mmol/L Final    Chloride 01/12/2022 108  97 - 108 mmol/L Final    CO2 01/12/2022 29  21 - 32 mmol/L Final    Anion gap 01/12/2022 7  5 - 15 mmol/L Final    Glucose 01/12/2022 154 (A) 65 - 100 mg/dL Final    BUN 01/12/2022 26 (A) 6 - 20 MG/DL Final    Creatinine 01/12/2022 1.51 (A) 0.55 - 1.02 MG/DL Final    BUN/Creatinine ratio 01/12/2022 17  12 - 20 Final    GFR est AA 01/12/2022 41 (A) >60 ml/min/1.73m2 Final    GFR est non-AA 01/12/2022 34 (A) >60 ml/min/1.73m2 Final    Comment: Estimated GFR is calculated using the IDMS-traceable Modification of Diet in  Renal Disease (MDRD) Study equation, reported for both  Americans  (GFRAA) and non- Americans (GFRNA), and normalized to 1.73m2 body  surface area. The physician must decide which value applies to the patient. Calcium 01/12/2022 9.7  8.5 - 10.1 MG/DL Final    Bilirubin, total 01/12/2022 0.6  0.2 - 1.0 MG/DL Final    ALT (SGPT) 01/12/2022 18  12 - 78 U/L Final    AST (SGOT) 01/12/2022 12 (A) 15 - 37 U/L Final    Alk. phosphatase 01/12/2022 143 (A) 45 - 117 U/L Final    Protein, total 01/12/2022 6.8  6.4 - 8.2 g/dL Final    Albumin 01/12/2022 3.7  3.5 - 5.0 g/dL Final    Globulin 01/12/2022 3.1  2.0 - 4.0 g/dL Final    A-G Ratio 01/12/2022 1.2  1.1 - 2.2   Final    Cholesterol, total 01/12/2022 103  <200 MG/DL Final    Triglyceride 01/12/2022 147  <150 MG/DL Final    Comment: Based on NCEP-ATP III:  Triglycerides <150 mg/dL  is considered normal, 150-199  mg/dL  borderline high,  200-499 mg/dL high and  greater than or equal to 500  mg/dL very high. HDL Cholesterol 01/12/2022 35  MG/DL Final    Comment: Based on NCEP ATP III, HDL Cholesterol <40 mg/dL is considered low and >60  mg/dL is elevated.       LDL, calculated 01/12/2022 38.6  0 - 100 MG/DL Final    Comment: Based on the NCEP-ATP: LDL-C concentrations are considered  optimal <100 mg/dL,  near optimal/above Normal 100-129 mg/dL Borderline High: 130-159, High: 160-189  mg/dL Very High: Greater than or equal to 190 mg/dL      VLDL, calculated 01/12/2022 29.4  MG/DL Final    CHOL/HDL Ratio 01/12/2022 2.9  0.0 - 5.0   Final    Hemoglobin A1c 01/12/2022 5.8 (A) 4.0 - 5.6 % Final    Comment: NEW METHOD PLEASE NOTE NEW REFERENCE RANGE  (NOTE)  HbA1C Interpretive Ranges  <5.7              Normal  5.7 - 6.4         Consider Prediabetes  >6.5              Consider Diabetes      Est. average glucose 01/12/2022 120  mg/dL Final    WBC 01/12/2022 7.1  3.6 - 11.0 K/uL Final    RBC 01/12/2022 4.05  3.80 - 5.20 M/uL Final    HGB 01/12/2022 11.9  11.5 - 16.0 g/dL Final    HCT 01/12/2022 40.3  35.0 - 47.0 % Final    MCV 01/12/2022 99.5 (A) 80.0 - 99.0 FL Final    MCH 01/12/2022 29.4  26.0 - 34.0 PG Final    MCHC 01/12/2022 29.5 (A) 30.0 - 36.5 g/dL Final    RDW 01/12/2022 17.0 (A) 11.5 - 14.5 % Final    PLATELET 96/03/8194 307  150 - 400 K/uL Final    MPV 01/12/2022 12.1  8.9 - 12.9 FL Final    NRBC 01/12/2022 0.0  0  WBC Final    ABSOLUTE NRBC 01/12/2022 0.00  0.00 - 0.01 K/uL Final    NEUTROPHILS 01/12/2022 75  32 - 75 % Final    LYMPHOCYTES 01/12/2022 14  12 - 49 % Final    MONOCYTES 01/12/2022 6  5 - 13 % Final    EOSINOPHILS 01/12/2022 5  0 - 7 % Final    BASOPHILS 01/12/2022 0  0 - 1 % Final    IMMATURE GRANULOCYTES 01/12/2022 0  0.0 - 0.5 % Final    ABS. NEUTROPHILS 01/12/2022 5.3  1.8 - 8.0 K/UL Final    ABS. LYMPHOCYTES 01/12/2022 1.0  0.8 - 3.5 K/UL Final    ABS. MONOCYTES 01/12/2022 0.4  0.0 - 1.0 K/UL Final    ABS. EOSINOPHILS 01/12/2022 0.3  0.0 - 0.4 K/UL Final    ABS. BASOPHILS 01/12/2022 0.0  0.0 - 0.1 K/UL Final    ABS. IMM.  GRANS. 01/12/2022 0.0  0.00 - 0.04 K/UL Final    DF 01/12/2022 AUTOMATED    Final    Color 01/13/2022 YELLOW/STRAW    Final    Color Reference Range: Straw, Yellow or Dark Yellow    Appearance 01/13/2022 CLOUDY (A) CLEAR   Final    Specific gravity 01/13/2022 1.017  1.003 - 1.030   Final    pH (UA) 01/13/2022 5.5  5.0 - 8.0   Final    Protein 01/13/2022 TRACE (A) Negative mg/dL Final    Glucose 01/13/2022 Negative  Negative mg/dL Final    Ketone 01/13/2022 Negative  Negative mg/dL Final    Bilirubin 01/13/2022 Negative  Negative   Final    Blood 01/13/2022 Negative  Negative   Final    Urobilinogen 01/13/2022 0.2  0.2 - 1.0 EU/dL Final    Nitrites 01/13/2022 Negative  Negative   Final    Leukocyte Esterase 01/13/2022 MODERATE (A) Negative   Final    UA:UC IF INDICATED 01/13/2022 URINE CULTURE ORDERED (A) CULTURE NOT INDICATED BY UA RESULT   Final    WBC 01/13/2022   0 - 4 /hpf Final    RBC 01/13/2022 0-5  0 - 5 /hpf Final    Epithelial cells 01/13/2022 FEW  FEW /lpf Final    Comment: Epithelial cell category consists of squamous cells and /or transitional  urothelial cells. Renal tubular cells, if present, are separately identified as  such. Bacteria 01/13/2022 3+ (A) Negative /hpf Final    Hyaline cast 01/13/2022 2-5  0 - 5 /lpf Final    Microalbumin,urine random 01/13/2022 2.34  MG/DL Final    No reference range has been established. Creatinine, urine 01/13/2022 101.00  mg/dL Final    No reference range has been established. Microalbumin/Creat ratio (mg/g cre* 01/13/2022 23  0 - 30 mg/g Final    Special Requests: 01/13/2022     Final                    Value:NO SPECIAL REQUESTS  Reflexed from [id number]      [place] Count 01/13/2022     Final                    Value:>100,000  COLONIES/mL      Culture result: 01/13/2022 ESCHERICHIA COLI (A)   Final   Hospital Outpatient Visit on 11/22/2021   Component Date Value Ref Range Status    Glucose (POC) 11/22/2021 136 (A) 65 - 117 mg/dL Final    Comment: (NOTE)  The FDA has indicated that no capillary point of care blood glucose  monitoring systems are approved for use in \"critically ill\" patients,  however they have not defined this population. The College of  American Pathologists has recommended that these devices should not  be used in cases such as severe hypotension, dehydration, shock, and  hyperglycemic-hyperosmolar state, amongst others. Venous or arterial  collection is the recommended specimen for testing these patients.       Performed by 11/22/2021 LIVIER DENNIS   Final    Ventricular Rate 11/22/2021 80  BPM Final    Atrial Rate 11/22/2021 80  BPM Final    QRS Duration 11/22/2021 146  ms Final    Q-T Interval 11/22/2021 462  ms Final    QTC Calculation (Bezet) 11/22/2021 532  ms Final    Calculated P Axis 11/22/2021 87  degrees Final    Calculated R Axis 11/22/2021 -86  degrees Final    Calculated T Axis 11/22/2021 91  degrees Final    Diagnosis 11/22/2021    Final                    Value:Ventricular-paced rhythm  Biventricular pacemaker detected  Abnormal ECG  When compared with ECG of 19-MAY-2021 10:00,  No significant change was found  Confirmed by Rodo Head ([id number]) on 11/22/2021 3:10:07 PM     Hospital Outpatient Visit on 11/18/2021   Component Date Value Ref Range Status    Specimen source 11/18/2021 Nasopharyngeal    Final    SARS-CoV-2 11/18/2021 Not detected  NOTD   Final    Comment:      The specimen is NEGATIVE for SARS-CoV-2, the novel coronavirus associated with COVID-19. A negative result does not rule out COVID-19. Ch[employer]is SARS-CoV-2 for use on the [employer] 6800/8800 Systems is a real-time RT-PCR test intended for the qualitative detection of nucleic acids from SARS-CoV-2  in clinician-collected nasal, nasopharyngeal,and oropharyngeal swab specimens from individuals who meet COVID-19 clinical and/or epidemiological criteria. [employer]iqu[employer] SARS-CoV-2 is for use only under Emergency Use Authorization (EUA) in laboratories certified under [address] (CLIA), [place] 641I, that meet requirements to perform high or moderate complexity tests. An individual without symptoms of COVID-19 and who is not shedding SARS-CoV-2 virus would expect to have a negative (not detected) result in this assay.   Fact sheet for Healthcare Providers: ConventionUpdate.co.nz  Fact sheet for Patients: http://www.herring[employer]/                           [address]240/download       Methodology: RT-PCR     Admission on 10/06/2021, Discharged on 10/06/2021   Component Date Value Ref Range Status    Glucose (POC) 10/06/2021 115  65 - 117 mg/dL Final    Comment: (NOTE)  The FDA has indicated that no capillary point of care blood glucose  monitoring systems are approved for use in \"critically ill\" patients,  however they have not defined this population. The College of  American Pathologists has recommended that these devices should not  be used in cases such as severe hypotension, dehydration, shock, and  hyperglycemic-hyperosmolar state, amongst others. Venous or arterial  collection is the recommended specimen for testing these patients. Performed by 10/06/2021 Josephinekirby Langfordhermila   Final    Glucose (POC) 10/06/2021 156 (A) 65 - 117 mg/dL Final    Comment: (NOTE)  The FDA has indicated that no capillary point of care blood glucose  monitoring systems are approved for use in \"critically ill\" patients,  however they have not defined this population. The College of  American Pathologists has recommended that these devices should not  be used in cases such as severe hypotension, dehydration, shock, and  hyperglycemic-hyperosmolar state, amongst others. Venous or arterial  collection is the recommended specimen for testing these patients.       Performed by 10/06/2021 Nelva Epley   Final    IVSd 10/06/2021 1.62 (A) 0.60 - 0.90 cm In process    IVSs 10/06/2021 1.91  cm In process    LVIDd 10/06/2021 4.98  3.90 - 5.30 cm In process    LVIDs 10/06/2021 3.44  cm In process    LVPWd 10/06/2021 1.70 (A) 0.60 - 0.90 cm In process    LVPWs 10/06/2021 2.06  cm In process    LA Major Axis 10/06/2021 3.63  cm In process    LV Mass 2D 10/06/2021 373.6  67 - 162 g In process    LV Mass 2D Index 10/06/2021 166.8  43 - 95 g/m2 In process    LA Minor [place] 10/06/2021 1.62  cm In process    Sodium 10/06/2021 142  136 - 145 mmol/L Final    Potassium 10/06/2021 4.1  3.5 - 5.1 mmol/L Final    Chloride 10/06/2021 110 (A) 97 - 108 mmol/L Final    CO2 10/06/2021 28  21 - 32 mmol/L Final    Anion gap 10/06/2021 4 (A) 5 - 15 mmol/L Final    Glucose 10/06/2021 291 (A) 65 - 100 mg/dL Final    BUN 10/06/2021 31 (A) 6 - 20 MG/DL Final Creatinine 10/06/2021 1.70 (A) 0.55 - 1.02 MG/DL Final    BUN/Creatinine ratio 10/06/2021 18  12 - 20   Final    GFR est AA 10/06/2021 36 (A) >60 ml/min/1.73m2 Final    GFR est non-AA 10/06/2021 30 (A) >60 ml/min/1.73m2 Final    Calcium 10/06/2021 8.6  8.5 - 10.1 MG/DL Final    WBC 10/06/2021 9.0  3.6 - 11.0 K/uL Final    RBC 10/06/2021 4.37  3.80 - 5.20 M/uL Final    HGB 10/06/2021 12.7  11.5 - 16.0 g/dL Final    HCT 10/06/2021 40.4  35.0 - 47.0 % Final    MCV 10/06/2021 92.4  80.0 - 99.0 FL Final    MCH 10/06/2021 29.1  26.0 - 34.0 PG Final    MCHC 10/06/2021 31.4  30.0 - 36.5 g/dL Final    RDW 10/06/2021 17.2 (A) 11.5 - 14.5 % Final    PLATELET 06/71/2312 622  150 - 400 K/uL Final    MPV 10/06/2021 11.5  8.9 - 12.9 FL Final    NRBC 10/06/2021 0.0  0  WBC Final    ABSOLUTE NRBC 10/06/2021 0.00  0.00 - 0.01 K/uL Final    Glucose (POC) 10/06/2021 265 (A) 65 - 117 mg/dL Final    Comment: (NOTE)  The FDA has indicated that no capillary point of care blood glucose  monitoring systems are approved for use in \"critically ill\" patients,  however they have not defined this population. The College of  American Pathologists has recommended that these devices should not  be used in cases such as severe hypotension, dehydration, shock, and  hyperglycemic-hyperosmolar state, amongst others. Venous or arterial  collection is the recommended specimen for testing these patients.       Performed by 10/06/2021 Carlito Lopez PCT   Final    Activated Clotting Time (POC) 10/06/2021 263 (A) 79 - [address] Final   Hospital Outpatient Visit on 10/01/2021   Component Date Value Ref Range Status    Color 10/01/2021 YELLOW/STRAW    Final    Color Reference Range: Straw, Yellow or Dark Yellow    Appearance 10/01/2021 CLEAR  CLEAR   Final    Specific gravity 10/01/2021 1.010  1.003 - 1.030   Final    pH (UA) 10/01/2021 6.5  5.0 - 8.0   Final    Protein 10/01/2021 Negative  NEG mg/dL Final    Glucose 10/01/2021 Negative  NEG mg/dL Final    Ketone 10/01/2021 Negative  NEG mg/dL Final    Bilirubin 10/01/2021 Negative  NEG   Final    Blood 10/01/2021 TRACE (A) NEG   Final    Urobilinogen 10/01/2021 0.2  0.2 - 1.0 EU/dL Final    Nitrites 10/01/2021 Negative  NEG   Final    Leukocyte Esterase 10/01/2021 SMALL (A) NEG   Final    WBC 10/01/2021 0-4  0 - 4 /hpf Final    RBC 10/01/2021 5-10  0 - 5 /hpf Final    Epithelial cells 10/01/2021 FEW  FEW /lpf Final    Epithelial cell category consists of squamous cells and /or transitional urothelial cells. Renal tubular cells, if present, are separately identified as such. Bacteria 10/01/2021 Negative  NEG /hpf Final    UA:UC IF INDICATED 10/01/2021 CULTURE NOT INDICATED BY UA RESULT  CNI   Final    Specimen source 10/01/2021 Nasopharyngeal    Final    SARS-CoV-2 10/01/2021 Not detected  NOTD   Final    Comment:      The specimen is NEGATIVE for SARS-CoV-2, the novel coronavirus associated with COVID-19. A negative result does not rule out COVID-19. Chiquis SARS-CoV-2 for use on the [employer] 6800/8800 Systems is a real-time RT-PCR test intended for the qualitative detection of nucleic acids from SARS-CoV-2  in clinician-collected nasal, nasopharyngeal,and oropharyngeal swab specimens from individuals who meet COVID-19 clinical and/or epidemiological criteria. [employer] SARS-CoV-2 is for use only under Emergency Use Authorization (EUA) in laboratories certified under [address] (CLIA), 42 [place]. 761N, that meet requirements to perform high or moderate complexity tests. An individual without symptoms of COVID-19 and who is not shedding SARS-CoV-2 virus would expect to have a negative (not detected) result in this assay.   Fact sheet for Healthcare Providers: ConventionUpdate.co.nz  Fact sheet for Patients: http://www.herring.[place]/                           [address]/download       Methodology: RT-PCR     Hospital Outpatient Visit on 09/30/2021 Component Date Value Ref Range Status    WBC 09/30/2021 8.0  3.6 - 11.0 K/uL Final    RBC 09/30/2021 4.51  3.80 - 5.20 M/uL Final    HGB 09/30/2021 13.0  11.5 - 16.0 g/dL Final    HCT 09/30/2021 41.5  35.0 - 47.0 % Final    MCV 09/30/2021 92.0  80.0 - 99.0 FL Final    MCH 09/30/2021 28.8  26.0 - 34.0 PG Final    MCHC 09/30/2021 31.3  30.0 - 36.5 g/dL Final    RDW 09/30/2021 17.2 (A) 11.5 - 14.5 % Final    PLATELET 46/58/2937 192  150 - 400 K/uL Final    MPV 09/30/2021 11.5  8.9 - 12.9 FL Final    NRBC 09/30/2021 0.0  0  WBC Final    ABSOLUTE NRBC 09/30/2021 0.00  0.00 - 0.01 K/uL Final    Sodium 09/30/2021 142  136 - 145 mmol/L Final    Potassium 09/30/2021 3.7  3.5 - 5.1 mmol/L Final    Chloride 09/30/2021 110 (A) 97 - 108 mmol/L Final    CO2 09/30/2021 26  21 - 32 mmol/L Final    Anion gap 09/30/2021 6  5 - 15 mmol/L Final    Glucose 09/30/2021 156 (A) 65 - 100 mg/dL Final    BUN 09/30/2021 28 (A) 6 - 20 MG/DL Final    Creatinine 09/30/2021 1.61 (A) 0.55 - 1.02 MG/DL Final    BUN/Creatinine ratio 09/30/2021 17  12 - 20   Final    GFR est AA 09/30/2021 38 (A) >60 ml/min/1.73m2 Final    GFR est non-AA 09/30/2021 32 (A) >60 ml/min/1.73m2 Final    Estimated GFR is calculated using the IDMS-traceable Modification of Diet in Renal Disease (MDRD) Study equation, reported for both  Americans (GFRAA) and non- Americans (GFRNA), and normalized to 1.73m2 body surface area. The physician must decide which value applies to the patient. Calcium 09/30/2021 9.3  8.5 - 10.1 MG/DL Final    Special Requests: 09/30/2021 NO SPECIAL REQUESTS    Final    Culture result: 09/30/2021 MRSA PRESENT (A)   Final    Culture result: 09/30/2021 Screening of patient nares for MRSA is for surveillance purposes and, if positive, to facilitate isolation considerations in high risk settings. It is not intended for automatic decolonization interventions per se as regimens are not sufficiently effective to warrant routine use. Final         Radiographic Studies:  XR Results (most recent):  Results from Hospital Encounter encounter on 09/30/21    XR CHEST PA LAT    Narrative  Exam:  2 view chest    Indication: Preoperative exam.    COMPARISON: 5/19/2021    PA and lateral views demonstrate mild cardiomegaly. ICD is in place. Lungs are  clear acute process. No adenopathy or pleural effusions. The patient status post vertebroplasty at T12 and L1. Impression  1. Mild cardiomegaly  2. No acute process     CARLOZ Results (most recent):  Results from [place] encounter on 05/10/22    CARLOZ POST BX IMAGING RT INCL CAD    Addendum 5/11/2022  4:27 PM  Addendum:    Addendum to stereotactic right breast biopsy, performed on 5/10/2022    PATHOLOGY RESULTS: Benign proliferative fibrocystic change with coarse stromal  calcification within fibrosis    RESULTS CONVEYED: Via telephone at 1625 hours EST on 5/11/2022. Patient voiced  her understanding and had no further questions at this time. ASSESSMENT: These results are concordant with the imaging findings. RECOMMENDATIONS: Continue annual screening mammograms, next due March 2023. Narrative  INDICATION: Right breast parenchymal asymmetry seen on recent mammograms  COMPARISON: Previous mammogram, April 26, 2022 and March 31, 2022. Cherl Spinner PROCEDURE:  After obtaining informed consent, and performing a \"time-out\" procedure, the  patient was placed on the stereotactic table with the right breast in the LM  position.  images were obtained. Using sterile technique and  approximately 4 cc's lidocaine for local anesthesia, a small skin incision was  made and the vacuum-assisted Suros core needle was advanced into the breast.  Appropriate needle position with respect to the parenchymal asymmetry was  documented with pre and post-fire images. Multiple cores were taken.   .  A biopsy clip was placed to padmini the biopsy site for future localization  purposes and its position confirmed with post-procedure digital mammograms. The  clip is positioned approximately 2 cm medial to the parenchymal asymmetry. .  There were no apparent complications. .    Impression  Stereotactic biopsy with Hydromark clip placement and pathology pending. Clip is  positioned approximately 2 cm medial to the parenchymal asymmetry. Further  recommendations will be based on final pathology results. CT Results (most recent):  Results from Hospital Encounter encounter on 05/19/21    CT ABD PELV WO CONT    Narrative  EXAM: CT ABD PELV WO CONT    INDICATION: lower abd pain, recurrent falls, weakness    COMPARISON: None    CONTRAST:  None. TECHNIQUE:  Thin axial images were obtained through the abdomen and pelvis. Coronal and  sagittal reformats were generated. Oral contrast was not administered. CT dose  reduction was achieved through use of a standardized protocol tailored for this  examination and automatic exposure control for dose modulation. The absence of intravenous contrast material reduces the sensitivity for  evaluation of the vasculature and solid organs. FINDINGS:  LOWER THORAX: No significant abnormality in the incidentally imaged lower chest.  LIVER: No mass. BILIARY TREE: Status post cholecystectomy. CBD is not dilated. SPLEEN: Spleen is mildly enlarged measuring 13.2 cm. PANCREAS: No focal abnormality. ADRENALS: Unremarkable. KIDNEYS/URETERS: Punctate nonobstructing renal stones are noted bilaterally. No  ureteral stone or hydronephrosis. STOMACH: Unremarkable. SMALL BOWEL: No dilatation or wall thickening. COLON: No dilatation or wall thickening. APPENDIX: Surgically absent. PERITONEUM: No ascites or pneumoperitoneum. RETROPERITONEUM: No lymphadenopathy or aortic aneurysm. REPRODUCTIVE ORGANS: The uterus is surgically absent. URINARY BLADDER: No mass or calculus. BONES: Degenerative changes are seen in the lumbar spine.  Kyphoplasty is noted  at T12 and L1.  ABDOMINAL WALL: No mass or hernia. ADDITIONAL COMMENTS: N/A    Impression  Bilateral nonobstructing renal stones. No ureteral stone or hydronephrosis. No  acute abnormality. DEXA Results (most recent):  Results from Appointment encounter on 05/14/19    DEXA BONE DENSITY STUDY AXIAL    Narrative  Bone Mineral Density    Indication: postmenopausal  Age: 79  Sex: Female. Menopause status: Postmenopausal.  Hormone replacement therapy: No    Number of falls in the past year: 1  Risk factors for osteoporosis: None. Current medication for osteoporosis: None. Comparison: None. Technique: Imaging was performed on the [employer]. World Health Organization  meta-analysis fracture risk calculator (FRAX) analysis was performed for 10 year  fracture risk probability assessment    Excluded sites: None    Findings:      Femoral Neck: Left  Bone mineral density (gm/cm2): 0.661  % of peak bone mass: 64  % for age matched controls: 70  T-score: -2.7  Z-score: -1.9    Total Hip: Left  Bone mineral density (gm/cm2): 0.804  % of peak bone mass: 80  % for age matched controls: 80  T-score: -1.6  Z-score: -1.2    Lumbar Spine: L1-L4  Bone mineral density (gm/cm2): 1.118  % of peak bone mass: 94  % for age matched controls: 80  T-score: -0.6  Z-score: -0.2    33% Radius:  Left  Bone mineral density (gm/cm2):  0.636  % of peak bone mass:  72  % for age matched controls:  T4  T-score:  -2.8  Z-score:  -1.3    Impression  Impression: This patient is osteoporotic using the World Health Organization criteria  10 year probability of major osteoporotic fracture: 13.7%  10 year probability of hip fracture: 3.3%    Recommendations:  Therapy recommendations need to be tailored to each individual patient.  Using  the [facility] FRAX absolute fracture algorithm, the  [address] [place] [address] recommends beginning pharmacological therapy in  postmenopausal women and men over the age of 48 with a 8 year probability of a  hip fracture of >3% OR with the 10 year probability of a major osteoporotic  fracture of >20%. Please reconsider testing based on risk factors. Currently, Medicare will only  reimburse for a central DXA examination every two years, unless the patient is  on chronic glucocorticoid therapy. Note: Please note that reliable, valid comparisons cannot be made between  studies which have been performed on machines from different manufacturers. If  clinically warranted, a follow up study performed at this site, on the same  unit, would allow the most sensitive assessment of change in bone mineral  density. MRI Results (most recent):  No results found for this or any previous visit. Assessment/Plan:       ICD-10-CM ICD-9-CM    1. Routine adult health maintenance  Z00.00 V70.0 HEMOGLOBIN A1C WITH EAG      CBC WITH AUTOMATED DIFF      METABOLIC PANEL, COMPREHENSIVE      LIPID PANEL      MICROALBUMIN, UR, RAND W/ MICROALB/CREAT RATIO      HEMOGLOBIN A1C WITH EAG      CBC WITH AUTOMATED DIFF      METABOLIC PANEL, COMPREHENSIVE      LIPID PANEL      MICROALBUMIN, UR, RAND W/ MICROALB/CREAT RATIO      2. Obesity, Class III, BMI 40-49.9 (morbid obesity) ([facility])  E66.01 278.01       3. Systolic CHF, chronic ([facility])  I50.22 428.22      428.0       4. Type 2 diabetes mellitus with diabetic neuropathy, without long-term current use of insulin ([facility])  E11.40 250.60      357.2       5. Chronic atrial fibrillation ([facility])  I48.20 427.31       6. Stage 3b chronic kidney disease ([facility])  N18.32 585.3       7. Coronary artery disease of native heart with stable angina pectoris, unspecified vessel or lesion type ([facility] [address]  I25.118 414.01      413.9       8. Essential hypertension  I10 401.9       9. Mixed hyperlipidemia  E78.2 272.2       10. Thrush  B37.0 112.0 nystatin (MYCOSTATIN) 100,000 unit/mL suspension      11. Type 2 diabetes with nephropathy ([facility])  E11.21 250.40 REFERRAL TO PODIATRY     583.81 REFERRAL TO OPHTHALMOLOGY      12.  Anxiety  F41.9 300.00       13. Encounter for annual wellness exam in Medicare patient  Z00.00 V70.0                  Healthcare Maintenance:  - Preventive measures are reviewed as per above  - Up to date on routine interventions except as noted above  - Orders placed to update gaps as noted  - Notes: Eye and foot examinations ordered. Fasting labs ordered. Atrial fibrillation   - Heart Rate Target: 60-90 bpm at rest   - Current Heart Rate Control: excellent   - Current Symptoms: none   - Current heart rate control: B-blocker   - Current anticoagulation:   Key Anti-Platelet Anticoagulant Meds               aspirin 81 mg cap (Taking) Take 81 mg by mouth daily. clopidogreL (PLAVIX) 75 mg tab Take 1 Tablet by mouth daily. Asaf Beecham well without bleeding/bruising sequelae. - Medication plan: continue; has watchman device as noted above        Coronary Artery Disease:   - Current Symptoms: No Symptoms, no angina   - History and Contributing Factors: diabetes, elevated cholesterol, hypertension, and known history of coronary artery disease  Key CAD CHF Meds               torsemide (DEMADEX) 20 mg tablet (Taking) TAKE 1 TABLET by mouth EVERY DAY    isosorbide mononitrate ER (IMDUR) 60 mg CR tablet (Taking) TAKE 1 TABLET EVERY DAY    carvediloL (COREG) 25 mg tablet (Taking) Take 1 Tablet by mouth two (2) times daily (with meals). hydrALAZINE (APRESOLINE) 25 mg tablet (Taking) Take 75 mg by mouth three (3) times daily. Indications: high blood pressure    aspirin 81 mg cap (Taking) Take 81 mg by mouth daily. atorvastatin (LIPITOR) 40 mg tablet (Taking) Take 1 Tab by mouth nightly. clopidogreL (PLAVIX) 75 mg tab Take 1 Tablet by mouth daily. - Target BP: < 130/80 mmHg; see Blood Pressure section   - Statin? YES   - Antiplatelet and/or Anticoagulant? YES   - ACEI/ARB? NO   - Beta Blocker?  YES   - Notes: Asymptomatic, follows with Cardiology; [facility]      Chronic Kidney Disease:   - Last Serum Creatinine: Lab Results   Component Value Date/Time    Creatinine (POC) 1.5 (H) 12/03/2019 01:27 PM    Creatinine 1.51 (H) 01/12/2022 08:50 AM       - Last GFR:   Lab Results   Component Value Date/Time    GFR est AA 41 (L) 01/12/2022 08:50 AM    GFR est non-AA 34 (L) 01/12/2022 08:50 AM      - Current Stage by eGFR: G3B   - Proteinuria: Checking MALB today   -   Key CKD Meds               torsemide (DEMADEX) 20 mg tablet (Taking) TAKE 1 TABLET by mouth EVERY DAY    cholecalciferol (VITAMIN D3) 25 mcg (1,000 unit) cap (Taking) Take  by mouth daily. cholecalciferol (VITAMIN D3) 25 mcg (1,000 unit) cap (Taking) Take 1,000 Units by mouth daily. potassium chloride (KLOR-CON M10) 10 mEq tablet TAKE 1 TABLET FOUR TIMES DAILY           - Blood Pressure Target: See Hypertension/Blood Pressure Management Section   - Advised avoidance of NSAIDs and other nephrotoxins wherever possible   - Advised renal dosing of medications where necessary   - Acid/Base Management: none   - Phosphorus Management: Vitamin D   - Nephrology Consultation: consider referral but will hold for now pending repeat labs   - Notes: CCM      Diabetes Mellitus:   - Type: Type 2 Diabetes   - Current A1C:   Lab Results   Component Value Date/Time    Hemoglobin A1c 5.8 (H) 01/12/2022 08:50 AM    Hemoglobin A1c (POC) 6.1 (A) 01/22/2018 01:42 PM       - Target R0G: <9%   - Complications: nephropathy and cardiovascular disease   - Relevant Diabetes Medications:  Key Antihyperglycemic Medications       Patient is on no antihyperglycemic meds.               - General Recommendations discussed today: diabetic diet discussed in detail, written exchange diet given, low cholesterol diet, weight control and daily exercise discussed, foot care discussed and Podiatry visits discussed, annual eye examinations at Ophthalmology discussed, glycohemoglobin and other lab monitoring discussed, and long term diabetic complications discussed   - Notes: on no medications at this time luis not start any pending repeat A1C; would consider Trulicity if needed          Essential Hypertension/Blood Pressure Management:   - Home BP Readings: usual 110/70   - Current Control: optimal   - Target BP: 130/80 mmHg   - Relevant BP Meds:  Key CAD CHF Meds               torsemide (DEMADEX) 20 mg tablet (Taking) TAKE 1 TABLET by mouth EVERY DAY    isosorbide mononitrate ER (IMDUR) 60 mg CR tablet (Taking) TAKE 1 TABLET EVERY DAY    carvediloL (COREG) 25 mg tablet (Taking) Take 1 Tablet by mouth two (2) times daily (with meals). hydrALAZINE (APRESOLINE) 25 mg tablet (Taking) Take 75 mg by mouth three (3) times daily. Indications: high blood pressure    aspirin 81 mg cap (Taking) Take 81 mg by mouth daily. atorvastatin (LIPITOR) 40 mg tablet (Taking) Take 1 Tab by mouth nightly. clopidogreL (PLAVIX) 75 mg tab Take 1 Tablet by mouth daily.               - Plan: continue current treatment regimen, continue current meds, dietary sodium restriction, regular aerobic exercise, weight loss   - Notes: CCM, mildly low, consider decreasing dose of Hydralazine        Hyperlipidemia/Dyslipidemia:   - Summary of Cardiovascular Risks and Goals:     LDL goal is under 100  diabetic  existing CAD  hypertension  hyperlipidemia  obese   -   Lab Results   Component Value Date/Time    LDL, calculated 38.6 01/12/2022 08:50 AM    HDL Cholesterol 35 01/12/2022 08:50 AM       - Relevant Cholesterol Meds:  Key Antihyperlipidemia Meds               atorvastatin (LIPITOR) 40 mg tablet (Taking) Take 1 Tab by mouth nightly.               - Cholesterol at target: yes   - Does patient meet USPSTF and ACC/AHA indications for pharmacotherapy (e.g., statin): yes   - GI symptoms with meds: NO   - Muscle aches with meds: NO   - Other Adverse effects with meds: NO   - Medication Plan: continue   - Notes: [facility]    Congestive Heart Failure:   - Current meds:    Key CAD CHF Meds               torsemide (DEMADEX) 20 mg tablet (Taking) TAKE 1 TABLET by mouth EVERY DAY    isosorbide mononitrate ER (IMDUR) 60 mg CR tablet (Taking) TAKE 1 TABLET EVERY DAY    carvediloL (COREG) 25 mg tablet (Taking) Take 1 Tablet by mouth two (2) times daily (with meals). hydrALAZINE (APRESOLINE) 25 mg tablet (Taking) Take 75 mg by mouth three (3) times daily. Indications: high blood pressure    aspirin 81 mg cap (Taking) Take 81 mg by mouth daily. atorvastatin (LIPITOR) 40 mg tablet (Taking) Take 1 Tab by mouth nightly.     clopidogreL (PLAVIX) 75 mg tab Take 1 Tablet by mouth daily.            - Last TTE: 11/21   - ACEI/ARB (Yes/No): NO   - ARB/ARNI (Yes/No): NO   - Spironolactone (Yes/No): NO   - Beta-blockade (Yes/No): YES   - Statin (Yes/No): YES   - BZJBZ1r (Yes/No): NO   - Diuretic Agents (Yes/No): YES   - Antiplatelet/Anticoagulation (Yes/No): YES   - Cardiology/Heart Failure Clinic Followup (Yes/No): YES   - Current NYHA Class Symptoms: 0   - Baseline Weight: 260 lbs   - Most Recent Weights:   Last 3 Recorded Weights in this Encounter    07/22/22 0804   Weight: 263 lb 4.8 oz (119.4 kg)       - Counseling provided: Emphasized salt restriction  Encouraged daily monitoring of the patient's weight  Encouraged regular exercise  No additional plans or changes   - Changes this visit: none   - Notes: Continue IMDUR + Hydralazine; appears euvolemic today, labs pending; continue Torsemide      Asthma/COPD:   - Current Symptoms: taking medications as instructed, no medication side effects noted, no significant ongoing wheezing or shortness of breath, using bronchodilator MDI less than twice a week   - CAT/ACT: ---   - MMRC: ---   - Current Assessment: mild persistent asthma, well controlled   - Current Regimen:   Key COPD Medications               SYMBICORT 160-4.5 mcg/actuation HFAA (Taking) INHALE 2 PUFFS BY MOUTH TWICE DAILY    albuterol (PROVENTIL HFA, VENTOLIN HFA, PROAIR HFA) 90 mcg/actuation inhaler (Taking) Take 1 Puff by inhalation every four (4) hours as needed for Wheezing.           - Plan: current treatment plan is effective, no change in therapy      Anxiety/Depression:   - Current PHQ: No data recorded    - Current RX:   Key Psychotherapeutic Meds               venlafaxine-SR (EFFEXOR-XR) 150 mg capsule (Taking) Take 150 mg by mouth two (2) times daily (with meals). - Psychology/Psychiatry Co-managing? No   - continue high dose Effexor. Advised patient that we would look to decrease her Klonopin dosing over time given the high risk of this medication. She endorses understanding and agreement. CCM for now. I have reviewed the patient's medical history in detail and updated the computerized patient record. We had a prolonged discussion about these complex clinical issues and went over the various important aspects to consider. All questions were answered. Advised the patient to call back or return to office if symptoms do not improve, change in nature, or persist.     The patient was given an after visit summary or informed of [place] [facility] which includes patient instructions, diagnoses, current medications, & vitals. she expressed understanding with the diagnosis and plan. Deepthi Braun MD    Please note that this dictation was completed with [employer], the computer voice recognition software. Quite often unanticipated grammatical, syntax, homophones, and other interpretive errors are inadvertently transcribed by the computer software. Please disregard these errors. Please excuse any errors that have escaped final proofreading. This is the Subsequent Medicare Annual Wellness Exam, performed 12 months or more after the Initial AWV or the last Subsequent AWV    I have reviewed the patient's medical history in detail and updated the computerized patient record.        Assessment/Plan   Education and counseling provided:  Are appropriate based on today's review and evaluation  Pneumococcal Vaccine  Cardiovascular screening blood test  Diabetes screening test    1. Routine adult health maintenance  -     HEMOGLOBIN A1C WITH EAG; Future  -     CBC WITH AUTOMATED DIFF; Future  -     METABOLIC PANEL, COMPREHENSIVE; Future  -     LIPID PANEL; Future  -     MICROALBUMIN, UR, RAND W/ MICROALB/CREAT RATIO; Future  2. Obesity, Class III, BMI 40-49.9 (morbid obesity) ([facility]  3. Systolic CHF, chronic ([facility]  4. Type 2 diabetes mellitus with diabetic neuropathy, without long-term current use of insulin ([facility]  5. Chronic atrial fibrillation (HCC)  6. Stage 3b chronic kidney disease ([facility]  7. Coronary artery disease of native heart with stable angina pectoris, unspecified vessel or lesion type ([facility]  8. Essential hypertension  9. Mixed hyperlipidemia  10. Thrush  -     nystatin (MYCOSTATIN) 100,000 unit/mL suspension; Take 5 mL by mouth four (4) times daily for 10 days. swish and spit, Normal, Disp-200 mL, R-0  11. Type 2 diabetes with nephropathy (HCC)  -     REFERRAL TO PODIATRY  -     REFERRAL TO OPHTHALMOLOGY  12. Anxiety  13.  Encounter for annual wellness exam in Medicare patient       Depression Risk Factor Screening     3 most recent PHQ Screens 7/22/2022   Little interest or pleasure in doing things Not at all   Feeling down, depressed, irritable, or hopeless Not at all   Total Score PHQ 2 0   Trouble falling or staying asleep, or sleeping too much -   Feeling tired or having little energy -   Poor appetite, weight loss, or overeating -   Feeling bad about yourself - or that you are a failure or have let yourself or your family down -   Trouble concentrating on things such as school, work, reading, or watching TV -   Moving or speaking so slowly that other people could have noticed; or the opposite being so fidgety that others notice -   Thoughts of being better off dead, or hurting yourself in some way -   PHQ 9 Score -   How difficult have these problems made it for you to do your work, take care of your home and get along with others - Alcohol & Drug Abuse Risk Screen    Do you average more than 1 drink per night or more than 7 drinks a week:  No    On any one occasion in the past three months have you have had more than 3 drinks containing alcohol:  No          Functional Ability and Level of Safety    Hearing: Hearing is good. Activities of Daily Living: The home contains: handrails and grab bars  Patient needs help with:  housework      Ambulation: with difficulty, uses a cane and rollator     Fall Risk:  Fall Risk Assessment, last 12 mths 1/12/2022   Able to walk? Yes   Fall in past 12 months? 0   Do you feel unsteady? 0   Are you worried about falling 0   Number of falls in past 12 months -   Fall with injury?  -      Abuse Screen:  Patient is not abused       Cognitive Screening    Has your family/caregiver stated any concerns about your memory: no     Cognitive Screening: Normal - Verbal Fluency Test    Health Maintenance Due     Health Maintenance Due   Topic Date Due    DTaP/Tdap/Td series (1 - Tdap) Never done    Shingrix Vaccine Age 49> (1 of 2) Never done    Pneumococcal 65+ years (2 - PPSV23 or PCV20) 10/11/2020    Foot Exam Q1  10/28/2021    Eye Exam Retinal or Dilated  12/11/2021    COVID-19 Vaccine (4 - Booster for [employer] series) 03/01/2022       Patient Care Team   Patient Care Team:  Maryam Harris MD as PCP - General (Internal Medicine Physician)  Eddie Arnett MD as PCP - [facility]  Danielle Godwin MD (Nephrology)  Rama Harris MD (Cardiovascular Disease Physician)  Je Cardenas MD (Female Pelvic Medicine and Reconstructive Surgery)  John Redding MD (Pulmonary Disease)  Zunilda Fields MD (Physical Medicine and Rehabilitation Physician)  Karis Lala MD (Neurology)  Elio Bedoya MD (Cardiovascular Disease Physician)    History     Patient Active Problem List   Diagnosis Code    Unspecified hereditary and idiopathic peripheral neuropathy G60.9    HBP (high blood pressure) I10    Spinal stenosis, lumbar region, without neurogenic claudication M48.061    Essential and other specified forms of tremor G25.0, G25.2    IBS (irritable bowel syndrome) K58.9    Anxiety and depression F41.9, F32. A    Migraine with aura, without mention of intractable migraine without mention of status migrainosus G43. 109    Right knee DJD M17.11    B12 deficiency E53.8    Ataxia R27.0    Foot pain, right S71.466    Acute systolic heart failure (HCC) I50.21    Fever R50.9    Recurrent UTI N39.0    Lower abdominal pain R10.30    Type 2 diabetes mellitus with diabetic neuropathy, without long-term current use of insulin ([facility]) E11.40    Chronic atrial fibrillation ([facility]) I48.20    Chronic systolic heart failure ([facility]) I50.22    Cellulitis of left lower leg L03.116    Anxiety F41.9    Hypercholesterolemia E78.00    Long-term use of high-risk medication Z79.899    Hypokalemia E87.6    Ischemic cardiomyopathy I25.5    Coronary artery disease of native heart with stable angina pectoris, unspecified vessel or lesion type ([facility] I25.118    Epigastric abdominal pain R10.13    S/P placement of cardiac pacemaker Z95.0    CKD stage G3b/A1, GFR 30-44 and albumin creatinine ratio <30 mg/g ([facility]) N18.32    Chronic cholecystitis K81.1    Asthma J45.909    Obesity (BMI 30-39. 9) E66.9    Acute asthma exacerbation J45.901    Lower extremity edema R60.0    Venous stasis dermatitis of both lower extremities I87.2    Ingrown toenail of left foot L60.0    Type 2 diabetes with nephropathy ([facility]) E11.21    Respiratory failure ([facility]) J96.90    Lower back pain M54.50    Hypoxia R09.02    Lethargy R53.83    Hypoglycemia E16.2    Open wound of left lower leg S81.802A    Polypharmacy Q94.235    Metabolic encephalopathy E91.50    Closed fracture of multiple ribs of right side with routine healing S22.41XD    Atrial fibrillation ([facility]) I48.91    Presence of Watchman left atrial appendage closure device Z95.818     Past Medical History: Diagnosis Date    Age-related osteoporosis without current pathological fracture     Anxiety and depression 01/22/2018    Arthritis     OA    Asthma     CAD (coronary artery disease)     Chronic systolic heart failure (HCC)     CKD stage G3b/A1, GFR 30-44 and albumin creatinine ratio <30 mg/g (HCC)     Essential hypertension     GERD (gastroesophageal reflux disease)     History of diverticulitis     diverticulitis    History of echocardiogram 11/2021    LV: Estimated LVEF is 40 - 45%. Visually measured ejection fraction. Normal cavity size and wall thickness. Globally reduced systolic function. LA: Moderately dilated left atrium. Left atrial appendage is completely occluded. A Watchman left atrial appendage occluder is present and completely occludes the appendage.     History of migraine     Hypercholesterolemia 01/22/2018    Irritable bowel syndrome     IBS, spinal stenosis    Long-term use of high-risk medication 01/22/2018    Lumbar spinal stenosis     Morbid (severe) obesity due to excess calories ([facility])     Neuropathy     Obesity (BMI 30-39.9) 04/30/2019    Obstructive sleep apnea     uses CPAP    Permanent atrial fibrillation (HCC)     Presence of implantable cardioverter-defibrillator (ICD)     Presence of Watchman left atrial appendage closure device     Type 2 diabetes mellitus with diabetic neuropathy, without long-term current use of insulin ([facility] 06/05/2016    Venous insufficiency     Vitamin B12 deficiency       Past Surgical History:   Procedure Laterality Date    COLONOSCOPY N/A 3/14/2018    COLONOSCOPY performed by Dale Horton MD at [facility] ENDOSCOPY    HX APPENDECTOMY      HX BREAST BIOPSY Left     about 4-5 years ago from 2022, neg    HX CHOLECYSTECTOMY  11/15/2018    HX HYSTERECTOMY      HX PACEMAKER      IR KYPHOPLASTY LUMBAR  12/22/2020    IR KYPHOPLASTY THORACIC  1/6/2021    DE CARDIAC SURG PROCEDURE UNLIST      stent     Current Outpatient Medications   Medication Sig Dispense Refill nystatin (MYCOSTATIN) 100,000 unit/mL suspension Take 5 mL by mouth four (4) times daily for 10 days. swish and spit 200 mL 0    clonazePAM (KlonoPIN) 0.5 mg tablet Take 1 tablet by mouth nightly 90 Tablet 0    nystatin (Nystop) powder Apply  to affected area four (4) times daily for 30 days. 15 g 0    torsemide (DEMADEX) 20 mg tablet TAKE 1 TABLET by mouth EVERY DAY 90 Tablet 3    isosorbide mononitrate ER (IMDUR) 60 mg CR tablet TAKE 1 TABLET EVERY DAY 90 Tablet 1    omeprazole (PRILOSEC) 40 mg capsule Take 1 Capsule by mouth daily. 90 Capsule 1    carvediloL (COREG) 25 mg tablet Take 1 Tablet by mouth two (2) times daily (with meals). 180 Tablet 1    triamcinolone acetonide (KENALOG) 0.1 % topical cream APPLY A THIN FILM OF CREAM EXTERNALLY TO THE AFFECTED AREA TWICE DAILY 15 g 3    L. acidophilus/Strept/La p-glenn (JONNIE-Q,2, PO) Take  by mouth daily. 16 billion cell cap      cholecalciferol (VITAMIN D3) 25 mcg (1,000 unit) cap Take  by mouth daily. acetaminophen (TYLENOL) 500 mg tablet Take 500 mg by mouth every six (6) hours as needed for Pain.      polyethylene glycol (MIRALAX) 17 gram packet Take 17 g by mouth daily as needed for Constipation. clotrimazole-betamethasone (LOTRISONE) topical cream Apply  to affected area two (2) times a day. hydrALAZINE (APRESOLINE) 25 mg tablet Take 75 mg by mouth three (3) times daily. Indications: high blood pressure      magnesium oxide (MAG-OX) 400 mg tablet Take 1 tablet by mouth twice daily (Patient taking differently: daily. ) 180 Tab 3    SYMBICORT 160-4.5 mcg/actuation HFAA INHALE 2 PUFFS BY MOUTH TWICE DAILY (Patient taking differently: 2 Puffs daily as needed.) 1 Inhaler 3    albuterol (PROVENTIL HFA, VENTOLIN HFA, PROAIR HFA) 90 mcg/actuation inhaler Take 1 Puff by inhalation every four (4) hours as needed for Wheezing.  (Patient taking differently: Take 1 Puff by inhalation two (2) times daily as needed for Wheezing.) 1 Inhaler 6    sodium chloride (OCEAN) 0.65 % nasal squeeze bottle 2 Sprays by Both Nostrils route every eight (8) hours as needed. conjugated estrogens (PREMARIN) 0.625 mg/gram vaginal cream Insert 0.5 g into vagina two (2) times a week. Using Daily      venlafaxine-SR (EFFEXOR-XR) 150 mg capsule Take 150 mg by mouth two (2) times daily (with meals). cholecalciferol (VITAMIN D3) 25 mcg (1,000 unit) cap Take 1,000 Units by mouth daily. aspirin 81 mg cap Take 81 mg by mouth daily. atorvastatin (LIPITOR) 40 mg tablet Take 1 Tab by mouth nightly. 30 Tab 12    clopidogreL (PLAVIX) 75 mg tab Take 1 Tablet by mouth daily. 30 Tablet 5    potassium chloride (KLOR-CON M10) 10 mEq tablet TAKE 1 TABLET FOUR TIMES DAILY 360 Tablet 1     Allergies   Allergen Reactions    Sulfa (Sulfonamide Antibiotics) Swelling    Amoxicillin Swelling       Family History   Problem Relation Age of Onset    OSTEOARTHRITIS Mother     Hypertension Mother     High Cholesterol Mother     Crohn's Disease Mother     Heart Disease Mother     Alcohol abuse Father     High Cholesterol Sister     Hypertension Sister     Thyroid Disease Sister     COPD Sister     High Cholesterol Brother     Hypertension Brother     COPD Brother     COPD Child     Inflammatory Bowel Dz Child      Social History     Tobacco Use    Smoking status: Never    Smokeless tobacco: Never   Substance Use Topics    Alcohol use: No         Marichuy Lay MD     This was a complex patient and total appointment time was at least 40 minutes, to include:  - review of medical record  - history gathering, physical examination, and review of systems  - medication reconciliation  - medical decision-making  - counseling on the plan of care        Follow-up and Dispositions    Return in about 6 months (around 1/22/2023), or if symptoms worsen or fail to improve.

## 2022-07-22 ENCOUNTER — OFFICE VISIT (OUTPATIENT)
Dept: INTERNAL MEDICINE CLINIC | Age: 72
End: 2022-07-22
Payer: MEDICARE

## 2022-07-22 DIAGNOSIS — Z00.00 ROUTINE ADULT HEALTH MAINTENANCE: Primary | ICD-10-CM

## 2022-07-22 DIAGNOSIS — I10 ESSENTIAL HYPERTENSION: ICD-10-CM

## 2022-07-22 DIAGNOSIS — B37.0 THRUSH: ICD-10-CM

## 2022-07-22 DIAGNOSIS — E78.2 MIXED HYPERLIPIDEMIA: ICD-10-CM

## 2022-07-22 DIAGNOSIS — I25.118 CORONARY ARTERY DISEASE OF NATIVE HEART WITH STABLE ANGINA PECTORIS, UNSPECIFIED VESSEL OR LESION TYPE (HCC): ICD-10-CM

## 2022-07-22 DIAGNOSIS — Z00.00 ENCOUNTER FOR ANNUAL WELLNESS EXAM IN MEDICARE PATIENT: ICD-10-CM

## 2022-07-22 DIAGNOSIS — F41.9 ANXIETY: ICD-10-CM

## 2022-07-22 DIAGNOSIS — E66.01 OBESITY, CLASS III, BMI 40-49.9 (MORBID OBESITY) (HCC): ICD-10-CM

## 2022-07-22 DIAGNOSIS — E11.40 TYPE 2 DIABETES MELLITUS WITH DIABETIC NEUROPATHY, WITHOUT LONG-TERM CURRENT USE OF INSULIN (HCC): ICD-10-CM

## 2022-07-22 DIAGNOSIS — I50.22 SYSTOLIC CHF, CHRONIC (HCC): ICD-10-CM

## 2022-07-22 DIAGNOSIS — N18.32 STAGE 3B CHRONIC KIDNEY DISEASE (HCC): ICD-10-CM

## 2022-07-22 DIAGNOSIS — I48.20 CHRONIC ATRIAL FIBRILLATION (HCC): ICD-10-CM

## 2022-07-22 DIAGNOSIS — E11.21 TYPE 2 DIABETES WITH NEPHROPATHY (HCC): ICD-10-CM

## 2022-07-22 LAB
ALBUMIN SERPL-MCNC: 3.8 G/DL (ref 3.5–5)
ALBUMIN/GLOB SERPL: 1.1 {RATIO} (ref 1.1–2.2)
ALP SERPL-CCNC: 165 U/L (ref 45–117)
ALT SERPL-CCNC: 23 U/L (ref 12–78)
ANION GAP SERPL CALC-SCNC: 6 MMOL/L (ref 5–15)
AST SERPL-CCNC: 17 U/L (ref 15–37)
BASOPHILS # BLD: 0.1 K/UL (ref 0–0.1)
BASOPHILS NFR BLD: 0 % (ref 0–1)
BILIRUB SERPL-MCNC: 0.7 MG/DL (ref 0.2–1)
BUN SERPL-MCNC: 19 MG/DL (ref 6–20)
BUN/CREAT SERPL: 11 (ref 12–20)
CALCIUM SERPL-MCNC: 10.1 MG/DL (ref 8.5–10.1)
CHLORIDE SERPL-SCNC: 106 MMOL/L (ref 97–108)
CHOLEST SERPL-MCNC: 114 MG/DL
CO2 SERPL-SCNC: 31 MMOL/L (ref 21–32)
CREAT SERPL-MCNC: 1.76 MG/DL (ref 0.55–1.02)
CREAT UR-MCNC: 113 MG/DL
DIFFERENTIAL METHOD BLD: ABNORMAL
EOSINOPHIL # BLD: 0.3 K/UL (ref 0–0.4)
EOSINOPHIL NFR BLD: 2 % (ref 0–7)
ERYTHROCYTE [DISTWIDTH] IN BLOOD BY AUTOMATED COUNT: 15.9 % (ref 11.5–14.5)
EST. AVERAGE GLUCOSE BLD GHB EST-MCNC: 140 MG/DL
GLOBULIN SER CALC-MCNC: 3.4 G/DL (ref 2–4)
GLUCOSE SERPL-MCNC: 198 MG/DL (ref 65–100)
HBA1C MFR BLD: 6.5 % (ref 4–5.6)
HCT VFR BLD AUTO: 45.5 % (ref 35–47)
HDLC SERPL-MCNC: 41 MG/DL
HDLC SERPL: 2.8 {RATIO} (ref 0–5)
HGB BLD-MCNC: 14.2 G/DL (ref 11.5–16)
IMM GRANULOCYTES # BLD AUTO: 0.1 K/UL (ref 0–0.04)
IMM GRANULOCYTES NFR BLD AUTO: 0 % (ref 0–0.5)
LDLC SERPL CALC-MCNC: 43.6 MG/DL (ref 0–100)
LYMPHOCYTES # BLD: 1.3 K/UL (ref 0.8–3.5)
LYMPHOCYTES NFR BLD: 12 % (ref 12–49)
MCH RBC QN AUTO: 30.7 PG (ref 26–34)
MCHC RBC AUTO-ENTMCNC: 31.2 G/DL (ref 30–36.5)
MCV RBC AUTO: 98.5 FL (ref 80–99)
MICROALBUMIN UR-MCNC: 1.45 MG/DL
MICROALBUMIN/CREAT UR-RTO: 13 MG/G (ref 0–30)
MONOCYTES # BLD: 0.6 K/UL (ref 0–1)
MONOCYTES NFR BLD: 5 % (ref 5–13)
NEUTS SEG # BLD: 9 K/UL (ref 1.8–8)
NEUTS SEG NFR BLD: 81 % (ref 32–75)
NRBC # BLD: 0 K/UL (ref 0–0.01)
NRBC BLD-RTO: 0 PER 100 WBC
PLATELET # BLD AUTO: 297 K/UL (ref 150–400)
PMV BLD AUTO: 11.1 FL (ref 8.9–12.9)
POTASSIUM SERPL-SCNC: 5 MMOL/L (ref 3.5–5.1)
PROT SERPL-MCNC: 7.2 G/DL (ref 6.4–8.2)
RBC # BLD AUTO: 4.62 M/UL (ref 3.8–5.2)
SODIUM SERPL-SCNC: 143 MMOL/L (ref 136–145)
TRIGL SERPL-MCNC: 147 MG/DL (ref ?–150)
VLDLC SERPL CALC-MCNC: 29.4 MG/DL
WBC # BLD AUTO: 11.3 K/UL (ref 3.6–11)

## 2022-07-22 PROCEDURE — G0439 PPPS, SUBSEQ VISIT: HCPCS | Performed by: INTERNAL MEDICINE

## 2022-07-22 PROCEDURE — 1123F ACP DISCUSS/DSCN MKR DOCD: CPT | Performed by: INTERNAL MEDICINE

## 2022-07-22 PROCEDURE — 3044F HG A1C LEVEL LT 7.0%: CPT | Performed by: INTERNAL MEDICINE

## 2022-07-22 PROCEDURE — 99215 OFFICE O/P EST HI 40 MIN: CPT | Performed by: INTERNAL MEDICINE

## 2022-07-22 RX ORDER — NYSTATIN 100000 [USP'U]/ML
500000 SUSPENSION ORAL 4 TIMES DAILY
Qty: 200 ML | Refills: 0 | Status: SHIPPED | OUTPATIENT
Start: 2022-07-22 | End: 2022-08-01

## 2022-07-22 NOTE — PROGRESS NOTES
Chief Complaint   Patient presents with    Establish Care       1. \"Have you been to the ER, urgent care clinic since your last visit? Hospitalized since your last visit? \" No    2. \"Have you seen or consulted any other health care providers outside of the [address] [place] [address] since your last visit? \" No     3. For patients aged 39-70: Has the patient had a colonoscopy / FIT/ Cologuard? No      If the patient is female:    4. For patients aged 41-77: Has the patient had a mammogram within the past 2 years? No      5. For patients aged 21-65: Has the patient had a pap smear?  No

## 2022-07-22 NOTE — PATIENT INSTRUCTIONS
Medicare Wellness Visit, Female     The best way to live healthy is to have a lifestyle where you eat a well-balanced diet, exercise regularly, limit alcohol use, and quit all forms of tobacco/nicotine, if applicable. Regular preventive services are another way to keep healthy. Preventive services (vaccines, screening tests, monitoring & exams) can help personalize your care plan, which helps you manage your own care. Screening tests can find health problems at the earliest stages, when they are easiest to treat. [facility] follows the current, evidence-based guidelines published by the [facility] ([facility]) when recommending preventive services for our patients. Because we follow these guidelines, sometimes recommendations change over time as research supports it. (For example, mammograms used to be recommended annually. Even though Medicare will still pay for an annual mammogram, the newer guidelines recommend a mammogram every two years for women of average risk). Of course, you and your doctor may decide to screen more often for some diseases, based on your risk and your co-morbidities (chronic disease you are already diagnosed with). Preventive services for you include:  - Medicare offers their members a free annual wellness visit, which is time for you and your primary care provider to discuss and plan for your preventive service needs. Take advantage of this benefit every year!  -All adults over the age of 72 should receive the recommended pneumonia vaccines. Current USPSTF guidelines recommend a series of two vaccines for the best pneumonia protection.   -All adults should have a flu vaccine yearly and a tetanus vaccine every 10 years.   -All adults age 48 and older should receive the shingles vaccines (series of two vaccines).       -All adults age 38-68 who are overweight should have a diabetes screening test once every three years.   -All adults born between [birth year] and 1963 should be screened once for Hepatitis C.  -Other screening tests and preventive services for persons with diabetes include: an eye exam to screen for diabetic retinopathy, a kidney function test, a foot exam, and stricter control over your cholesterol.   -Cardiovascular screening for adults with routine risk involves an electrocardiogram (ECG) at intervals determined by your doctor.   -Colorectal cancer screenings should be done for adults age 54-65 with no increased risk factors for colorectal cancer. There are a number of acceptable methods of screening for this type of cancer. Each test has its own benefits and drawbacks. Discuss with your doctor what is most appropriate for you during your annual wellness visit. The different tests include: colonoscopy (considered the best screening method), a fecal occult blood test, a fecal DNA test, and sigmoidoscopy.    -A bone mass density test is recommended when a woman turns 65 to screen for osteoporosis. This test is only recommended one time, as a screening. Some providers will use this same test as a disease monitoring tool if you already have osteoporosis. -Breast cancer screenings are recommended every other year for women of normal risk, age 54-69.  -Cervical cancer screenings for women over age 72 are only recommended with certain risk factors.      Here is a list of your current Health Maintenance items (your personalized list of preventive services) with a due date:  Health Maintenance Due   Topic Date Due    DTaP/Tdap/Td  (1 - Tdap) Never done    Shingles Vaccine (1 of 2) Never done    Pneumococcal Vaccine (2 - PPSV23 or PCV20) 10/11/2020    Diabetic Foot Care  10/28/2021    Eye Exam  12/11/2021    COVID-19 Vaccine (4 - Booster for [employer] series) 03/01/2022

## 2022-07-25 VITALS
BODY MASS INDEX: 37.1 KG/M2 | DIASTOLIC BLOOD PRESSURE: 60 MMHG | SYSTOLIC BLOOD PRESSURE: 100 MMHG | OXYGEN SATURATION: 94 % | TEMPERATURE: 98.8 F | WEIGHT: 236.4 LBS | HEIGHT: 67 IN | HEART RATE: 80 BPM

## 2022-08-01 DIAGNOSIS — B37.0 THRUSH: ICD-10-CM

## 2022-08-01 RX ORDER — NYSTATIN 100000 [USP'U]/ML
500000 SUSPENSION ORAL 4 TIMES DAILY
Qty: 200 ML | Refills: 0 | OUTPATIENT
Start: 2022-08-01 | End: 2022-08-11

## 2022-08-01 NOTE — TELEPHONE ENCOUNTER
PCP: Adithya Bolaños MD    Last appt: 7/22/2022  Future Appointments   Date Time Provider Tammy Perales   1/25/2023  8:00 AM Adithya Bolaños MD [facility] AMB       Requested Prescriptions     Pending Prescriptions Disp Refills    nystatin (MYCOSTATIN) 100,000 unit/mL suspension 200 mL 0     Sig: Take 5 mL by mouth four (4) times daily for 10 days.  swish and spit         Other Comments:

## 2022-08-01 NOTE — TELEPHONE ENCOUNTER
PCP: Maral Natarajan MD    Last appt: 7/22/2022  Future Appointments   Date Time Provider Tammy Perales   1/25/2023  8:00 AM Maral Natarajan MD [facility] AMB       Requested Prescriptions     Pending Prescriptions Disp Refills    nystatin (MYCOSTATIN) 100,000 unit/mL suspension 200 mL 0     Sig: Take 5 mL by mouth four (4) times daily for 10 days.  swish and spit         Other Comments:

## 2022-08-01 NOTE — TELEPHONE ENCOUNTER
PCP: Hanny Rodriguez MD    Last appt: 7/22/2022  Future Appointments   Date Time Provider Tammy Perales   1/25/2023  8:00 AM Hanny Rodriguez MD [facility] AMB       Requested Prescriptions     Pending Prescriptions Disp Refills    nystatin (MYCOSTATIN) 100,000 unit/mL suspension 200 mL 0     Sig: Take 5 mL by mouth four (4) times daily for 10 days.  swish and spit         Other Comments:

## 2022-08-01 NOTE — TELEPHONE ENCOUNTER
PCP: Xavier Rosa MD    Last appt: 7/22/2022  Future Appointments   Date Time Provider Tammy Perales   1/25/2023  8:00 AM Xavier Rosa MD [facility] AMB       Requested Prescriptions     Pending Prescriptions Disp Refills    nystatin (MYCOSTATIN) 100,000 unit/mL suspension 200 mL 0     Sig: Take 5 mL by mouth four (4) times daily for 10 days.  swish and spit       Prior labs and Blood pressures:  BP Readings from Last 3 Encounters:   07/22/22 100/60   05/10/22 135/84   01/12/22 126/70     Lab Results   Component Value Date/Time    Sodium 143 07/22/2022 08:42 AM    Potassium 5.0 07/22/2022 08:42 AM    Chloride 106 07/22/2022 08:42 AM    CO2 31 07/22/2022 08:42 AM    Anion gap 6 07/22/2022 08:42 AM    Glucose 198 (H) 07/22/2022 08:42 AM    BUN 19 07/22/2022 08:42 AM    Creatinine 1.76 (H) 07/22/2022 08:42 AM    BUN/Creatinine ratio 11 (L) 07/22/2022 08:42 AM    GFR est AA 35 (L) 07/22/2022 08:42 AM    GFR est non-AA 29 (L) 07/22/2022 08:42 AM    Calcium 10.1 07/22/2022 08:42 AM

## 2022-08-02 ENCOUNTER — TELEPHONE (OUTPATIENT)
Dept: INTERNAL MEDICINE CLINIC | Age: 72
End: 2022-08-02

## 2022-08-02 RX ORDER — CLOTRIMAZOLE 10 MG/1
10 LOZENGE ORAL; TOPICAL
Qty: 50 TABLET | Refills: 0 | Status: SHIPPED | OUTPATIENT
Start: 2022-08-02 | End: 2022-08-12

## 2022-08-02 NOTE — TELEPHONE ENCOUNTER
PCP: King Nnamdi MD    Last appt: 7/22/2022  Future Appointments   Date Time Provider Tammy Perales   1/25/2023  8:00 AM King Nnamdi MD [facility] AMB       Requested Prescriptions     Pending Prescriptions Disp Refills    nystatin (MYCOSTATIN) 100,000 unit/mL suspension 200 mL 0     Sig: Take 5 mL by mouth four (4) times daily for 10 days.  swish and spit         Other Comments:

## 2022-08-02 NOTE — TELEPHONE ENCOUNTER
Spoke to patient's , he stated that the medication that they currently have for his wife's tongue thrush is not helping and they would like to know if something different can be called in or if the patient needs to be seen? Please advise.

## 2022-08-03 RX ORDER — NYSTATIN 100000 [USP'U]/ML
500000 SUSPENSION ORAL 4 TIMES DAILY
Qty: 200 ML | Refills: 0 | OUTPATIENT
Start: 2022-08-03 | End: 2022-08-13

## 2022-08-18 RX ORDER — NYSTATIN AND TRIAMCINOLONE ACETONIDE 100000; 1 [USP'U]/G; MG/G
OINTMENT TOPICAL 2 TIMES DAILY
Qty: 30 G | Refills: 3 | Status: SHIPPED | OUTPATIENT
Start: 2022-08-18

## 2022-09-20 NOTE — TELEPHONE ENCOUNTER
Goal Outcome Evaluation:        A/O x4. VSS on RA. UP with 1, gw. Regular diet. Dressing CDI. Wound vac in place, no output. 2/10 pain, declined interventions. PICC placed today. Discharge home with home infusions, pending.                PCP: Sean Carey MD    Last appt: 7/22/2022  Future Appointments   Date Time Provider Tammy Perales   1/25/2023  8:00 AM Sean Carey MD [facility] AMB       Requested Prescriptions     Pending Prescriptions Disp Refills    nystatin-triamcinolone (MYCOLOG) 100,000-0.1 unit/gram-% ointment 30 g 3     Sig: Apply  to affected area two (2) times a day.          Other Comments:

## 2022-10-03 RX ORDER — ISOSORBIDE MONONITRATE 60 MG/1
TABLET, EXTENDED RELEASE ORAL
Qty: 90 TABLET | Refills: 1 | OUTPATIENT
Start: 2022-10-03

## 2022-10-03 RX ORDER — ISOSORBIDE MONONITRATE 60 MG/1
TABLET, EXTENDED RELEASE ORAL
Qty: 90 TABLET | Refills: 3 | Status: SHIPPED | OUTPATIENT
Start: 2022-10-03

## 2022-10-09 DIAGNOSIS — F41.9 ANXIETY: ICD-10-CM

## 2022-10-10 RX ORDER — CLONAZEPAM 0.5 MG/1
TABLET ORAL
Qty: 90 TABLET | Refills: 0 | Status: SHIPPED | OUTPATIENT
Start: 2022-10-10

## 2022-11-15 RX ORDER — CARVEDILOL 25 MG/1
TABLET ORAL
Qty: 180 TABLET | Refills: 1 | Status: SHIPPED | OUTPATIENT
Start: 2022-11-15

## 2022-11-15 RX ORDER — OMEPRAZOLE 40 MG/1
CAPSULE, DELAYED RELEASE ORAL
Qty: 90 CAPSULE | Refills: 1 | Status: SHIPPED | OUTPATIENT
Start: 2022-11-15

## 2022-11-15 NOTE — TELEPHONE ENCOUNTER
PCP: Raymundo Carrizales MD    Last appt: 7/22/2022  Future Appointments   Date Time Provider Tammy Perales   1/25/2023  8:00 AM Raymundo Carrizales MD [facility] AM[facility]       Last refilled:4/26/22    Requested Prescriptions     Pending Prescriptions Disp Refills    carvediloL (COREG) 25 mg tablet [Pharmacy Med Name: CARVEDILOL 25 MG Tablet] 180 Tablet 1     Sig: TAKE 1 TABLET TWICE DAILY WITH MEALS    omeprazole (PRILOSEC) 40 mg capsule [Pharmacy Med Name: OMEPRAZOLE 40 MG Capsule Delayed Release] 90 Capsule 1     Sig: TAKE 1 CAPSULE EVERY DAY

## 2022-11-16 NOTE — TELEPHONE ENCOUNTER
PCP: Guanako Marcelo MD    Last appt: 7/22/2022  Future Appointments   Date Time Provider Tammy Perales   1/25/2023  8:00 AM Guanako Marcelo MD [facility] AM[facility]       Requested Prescriptions     Pending Prescriptions Disp Refills    potassium chloride (KLOR-CON M10) 10 mEq tablet 360 Tablet 1       Prior labs and Blood pressures:  BP Readings from Last 3 Encounters:   07/22/22 100/60   05/10/22 135/84   01/12/22 126/70     Lab Results   Component Value Date/Time    Sodium 143 07/22/2022 08:42 AM    Potassium 5.0 07/22/2022 08:42 AM    Chloride 106 07/22/2022 08:42 AM    CO2 31 07/22/2022 08:42 AM    Anion gap 6 07/22/2022 08:42 AM    Glucose 198 (H) 07/22/2022 08:42 AM    BUN 19 07/22/2022 08:42 AM    Creatinine 1.76 (H) 07/22/2022 08:42 AM    BUN/Creatinine ratio 11 (L) 07/22/2022 08:42 AM    GFR est AA 35 (L) 07/22/2022 08:42 AM    GFR est non-AA 29 (L) 07/22/2022 08:42 AM    Calcium 10.1 07/22/2022 08:42 AM     Lab Results   Component Value Date/Time    Hemoglobin A1c 6.5 (H) 07/22/2022 08:42 AM    Hemoglobin A1c (POC) 6.1 (A) 01/22/2018 01:42 PM     Lab Results   Component Value Date/Time    Cholesterol, total 114 07/22/2022 08:42 AM    Cholesterol (POC) 138.0 01/22/2018 01:42 PM    HDL Cholesterol 41 07/22/2022 08:42 AM    HDL Cholesterol (POC) 40.0 01/22/2018 01:42 PM    LDL Cholesterol (POC) 50.4 01/22/2018 01:42 PM    LDL, calculated 43.6 07/22/2022 08:42 AM    VLDL, calculated 29.4 07/22/2022 08:42 AM    Triglyceride 147 07/22/2022 08:42 AM    Triglycerides (POC) 238.0 (A) 01/22/2018 01:42 PM    CHOL/HDL Ratio 2.8 07/22/2022 08:42 AM     No results found for: Viridiana Godinez [id number]    Lab Results   Component Value Date/Time    TSH 3.28 01/12/2022 08:50 AM    TSH, 3rd generation 2.75 03/08/2021 10:56 AM

## 2022-11-18 RX ORDER — POTASSIUM CHLORIDE 750 MG/1
TABLET, EXTENDED RELEASE ORAL
Qty: 360 TABLET | Refills: 1 | Status: SHIPPED | OUTPATIENT
Start: 2022-11-18

## 2022-11-18 RX ORDER — POTASSIUM CHLORIDE 750 MG/1
TABLET, EXTENDED RELEASE ORAL
Qty: 360 TABLET | Refills: 1 | OUTPATIENT
Start: 2022-11-18

## 2022-11-18 NOTE — TELEPHONE ENCOUNTER
PCP: Vidal Garrett MD    Last appt: 7/22/2022  Future Appointments   Date Time Provider Tammy Perales   1/25/2023  8:00 AM Vidal Garrett MD [facility] AMB       Requested Prescriptions     Pending Prescriptions Disp Refills    potassium chloride (KLOR-CON M10) 10 mEq tablet 360 Tablet 1       Prior labs and Blood pressures:  BP Readings from Last 3 Encounters:   07/22/22 100/60   05/10/22 135/84   01/12/22 126/70     Lab Results   Component Value Date/Time    Sodium 143 07/22/2022 08:42 AM    Potassium 5.0 07/22/2022 08:42 AM    Chloride 106 07/22/2022 08:42 AM    CO2 31 07/22/2022 08:42 AM    Anion gap 6 07/22/2022 08:42 AM    Glucose 198 (H) 07/22/2022 08:42 AM    BUN 19 07/22/2022 08:42 AM    Creatinine 1.76 (H) 07/22/2022 08:42 AM    BUN/Creatinine ratio 11 (L) 07/22/2022 08:42 AM    GFR est AA 35 (L) 07/22/2022 08:42 AM    GFR est non-AA 29 (L) 07/22/2022 08:42 AM    Calcium 10.1 07/22/2022 08:42 AM

## 2023-01-06 NOTE — PROGRESS NOTES
ICD-10-CM ICD-9-CM    1. Routine adult health maintenance  Z00.00 V70.0       2. Severe obesity (BMI 35.0-39.9) with comorbidity ([facility])  E66.01 278.01       3. Systolic CHF, chronic ([facility])  I50.22 428.22      428.0       4. Type 2 diabetes with nephropathy ([facility])  E11.21 250.40 HEMOGLOBIN A1C WITH EAG     583.81       5. Chronic atrial fibrillation ([facility])  I48.20 427.31       6. Coronary artery disease of native heart with stable angina pectoris, unspecified vessel or lesion type ([facility])  I25.118 414.01 LIPID PANEL     413.9       7. Stage 3b chronic kidney disease ([facility])  N18.32 585.3 CBC WITH AUTOMATED DIFF      METABOLIC PANEL, COMPREHENSIVE      MICROALBUMIN, UR, RAND W/ MICROALB/CREAT RATIO      8. Hypercholesterolemia  E78.00 272.0                Subjective:     Chief Complaint   Patient presents with    Follow-up       Nitza Luis is a 72 y.o. F.  She has a past medical history of coronary artery disease with chronic systolic heart failure as well as chronic kidney disease, among other numerous medical problems.  I reviewed and updated the medical record.  I saw this patient most recently in July for establishment.  At the time, the patient had recently undergone watchman placement for a history of chronic atrial fibrillation.  She was feeling generally okay.  She had complained of a sensation of stickiness and discharge on her tongue that had developed over the previous few weeks.  Physical examination has adjusted the possibility of thrush.  She was otherwise using Imdur and hydralazine for her heart failure, and using Klonopin on a regular basis for a history of anxiety.  Physical examination was notable for severe obesity with a BMI of 41 kg/m², along with bilateral lower extremity edema with venous stasis changes.  She was using a Rollator.  Laboratory studies were ordered, and she was given a prescription for nystatin swish and swallow for thrush.    Today, the patient comes in for routine follow-up.   She is accompanied by her  who confirms aspects of the history.  Together, they report that the patient has been doing generally well over the past 1 months.  She saw her cardiologist and nephrologist both recently, and was felt by both of those to be doing generally well.  Laboratory studies by her nephrologist apparently had demonstrated stable kidney function back in December, although the results of this are not available to me for review.  She denies having had any recent change in her exercise tolerance, although she is generally rather sedentary at baseline.  She typically walks with the use of her Rollator and continue to do so.  She denies having had any recent chest pain, shortness of breath, or orthopnea or paroxysmal nocturnal dyspnea.  Overall her breathing has been stable and she has not had to resort to rescue inhaler after using Symbicort.  She says that the thrush that she had been having before has completely resolved, although this took a while while using the nystatin swish and swallow.  She has been checking her blood glucose readings about a couple times per week and typically gets readings in the 120 mg/dL range without any hypoglycemic episodes or polyuria or polydipsia.  She does not currently take any medications for diabetes.  Her review of systems is otherwise negative.    Routine Healthcare Maintenance issues are reviewed and discussed with the patient as noted below. Orders to update gaps in healthcare maintenance were placed as noted below in the Assessment and Plan, where applicable.     Past Medical History:  Past Medical History:   Diagnosis Date    Age-related osteoporosis without current pathological fracture     Anxiety and depression 01/22/2018    Arthritis     OA    Asthma     CAD (coronary artery disease)     Chronic systolic heart failure (HCC)     CKD stage G3b/A1, GFR 30-44 and albumin creatinine ratio <30 mg/g (HCC)     Essential hypertension     GERD  (gastroesophageal reflux disease)     History of diverticulitis     diverticulitis    History of echocardiogram 11/2021    LV: Estimated LVEF is 40 - 45%. Visually measured ejection fraction. Normal cavity size and wall thickness. Globally reduced systolic function.  LA: Moderately dilated left atrium. Left atrial appendage is completely occluded. A Watchman left atrial appendage occluder is present and completely occludes the appendage.    History of migraine     Hypercholesterolemia 01/22/2018    Irritable bowel syndrome     IBS, spinal stenosis    Long-term use of high-risk medication 01/22/2018    Lumbar spinal stenosis     Morbid (severe) obesity due to excess calories ([facility])     Neuropathy     Obesity (BMI 30-39.9) 04/30/2019    Obstructive sleep apnea     uses CPAP    Permanent atrial fibrillation ([facility])     Presence of implantable cardioverter-defibrillator (ICD)     Presence of Watchman left atrial appendage closure device     Type 2 diabetes mellitus with diabetic neuropathy, without long-term current use of insulin ([facility]) 06/05/2016    Venous insufficiency     Vitamin B12 deficiency        Past Surgical Histor:  Past Surgical History:   Procedure Laterality Date    COLONOSCOPY N/A 3/14/2018    COLONOSCOPY performed by Enrike Navarrete MD at [facility] ENDOSCOPY    HX APPENDECTOMY      HX BREAST BIOPSY Left     about 4-5 years ago from 2022, neg    HX CHOLECYSTECTOMY  11/15/2018    HX HYSTERECTOMY      HX PACEMAKER      IR KYPHOPLASTY LUMBAR  12/22/2020    IR KYPHOPLASTY THORACIC  1/6/2021    MO UNLISTED PROCEDURE CARDIAC SURGERY      stent       Allergies:  Allergies   Allergen Reactions    Sulfa (Sulfonamide Antibiotics) Swelling    Amoxicillin Swelling       Medications:  Current Outpatient Medications   Medication Sig Dispense Refill    clonazePAM (KlonoPIN) 0.5 mg tablet Take 1 tablet by mouth nightly 90 Tablet 0    potassium chloride (KLOR-CON M10) 10 mEq tablet TAKE 1 TABLET FOUR TIMES DAILY 360 Tablet 1     carvediloL (COREG) 25 mg tablet TAKE 1 TABLET TWICE DAILY WITH MEALS 180 Tablet 1    omeprazole (PRILOSEC) 40 mg capsule TAKE 1 CAPSULE EVERY DAY 90 Capsule 1    isosorbide mononitrate ER (IMDUR) 60 mg CR tablet TAKE 1 TABLET EVERY DAY 90 Tablet 3    nystatin-triamcinolone (MYCOLOG) 100,000-0.1 unit/gram-% ointment Apply  to affected area two (2) times a day. 30 g 3    triamcinolone acetonide (KENALOG) 0.1 % topical cream APPLY A THIN FILM OF CREAM EXTERNALLY TO THE AFFECTED AREA TWICE DAILY 15 g 3    torsemide (DEMADEX) 20 mg tablet TAKE 1 TABLET by mouth EVERY DAY 90 Tablet 3    L. acidophilus/Strept/La p-glenn (JONNIE-Q,2, PO) Take  by mouth daily. 16 billion cell cap      cholecalciferol (VITAMIN D3) 25 mcg (1,000 unit) cap Take  by mouth daily.      acetaminophen (TYLENOL) 500 mg tablet Take 500 mg by mouth every six (6) hours as needed for Pain.      polyethylene glycol (MIRALAX) 17 gram packet Take 17 g by mouth daily as needed for Constipation.      clotrimazole-betamethasone (LOTRISONE) topical cream Apply  to affected area two (2) times a day.      hydrALAZINE (APRESOLINE) 25 mg tablet Take 75 mg by mouth three (3) times daily. Indications: high blood pressure      magnesium oxide (MAG-OX) 400 mg tablet Take 1 tablet by mouth twice daily (Patient taking differently: daily.) 180 Tab 3    SYMBICORT 160-4.5 mcg/actuation HFAA INHALE 2 PUFFS BY MOUTH TWICE DAILY (Patient taking differently: 2 Puffs daily as needed.) 1 Inhaler 3    albuterol (PROVENTIL HFA, VENTOLIN HFA, PROAIR HFA) 90 mcg/actuation inhaler Take 1 Puff by inhalation every four (4) hours as needed for Wheezing. (Patient taking differently: Take 1 Puff by inhalation two (2) times daily as needed for Wheezing.) 1 Inhaler 6    sodium chloride (OCEAN) 0.65 % nasal squeeze bottle 2 Sprays by Both Nostrils route every eight (8) hours as needed.      conjugated estrogens (PREMARIN) 0.625 mg/gram vaginal cream Insert 0.5 g into vagina two (2) times a week.  Using Daily      venlafaxine-SR (EFFEXOR-XR) 150 mg capsule Take 150 mg by mouth two (2) times daily (with meals).      aspirin 81 mg cap Take 81 mg by mouth daily.      atorvastatin (LIPITOR) 40 mg tablet Take 1 Tab by mouth nightly. 30 Tab 12    cholecalciferol (VITAMIN D3) 25 mcg (1,000 unit) cap Take 1,000 Units by mouth daily.         Social History:  Social History     Socioeconomic History    Marital status:    Tobacco Use    Smoking status: Never    Smokeless tobacco: Never   Vaping Use    Vaping Use: Never used   Substance and Sexual Activity    Alcohol use: No    Drug use: No       Family History:  Family History   Problem Relation Age of Onset    OSTEOARTHRITIS Mother     Hypertension Mother     High Cholesterol Mother     Crohn's Disease Mother     Heart Disease Mother     Alcohol abuse Father     High Cholesterol Sister     Hypertension Sister     Thyroid Disease Sister     COPD Sister     High Cholesterol Brother     Hypertension Brother     COPD Brother     COPD Child     Inflammatory Bowel Dz Child        Immunizations:  Immunization History   Administered Date(s) Administered     Influenza, FLUZONE (age 65 y+), High Dose 10/20/2020    COVID-19, PFIZER PURPLE top, DILUTE for use, (age 12 y+), IM, 30mcg/0.3mL 03/13/2021, 04/03/2021, 11/01/2021    Influenza High Dose Vaccine PF 09/20/2017, 10/11/2019, 11/01/2021, 11/07/2022    Influenza Vaccine 10/01/2016    Influenza, FLUARIX, FLULAVAL, FLUZONE (age 6 mo+) AND AFLURIA, (age 3 y+), PF, 0.5mL 10/17/2018    Pneumococcal Conjugate (PCV-13) 10/11/2019        Healthcare Maintenance:  Health Maintenance   Topic Date Due    DTaP/Tdap/Td series (1 - Tdap) Never done    Shingles Vaccine (1 of 2) Never done    Pneumococcal 65+ years (2 - PPSV23 if available, else PCV20) 10/11/2020    Eye Exam Retinal or Dilated  12/11/2021    COVID-19 Vaccine (4 - Booster for Pfizer series) 12/27/2021    Foot Exam Q1  03/01/2023    Diabetic Alb to Cr ratio (uACR) test   07/22/2023    GFR test (Diabetes, CKD 3-4, OR last GFR 15-59)  07/22/2023    Depression Monitoring  07/22/2023    A1C test (Diabetic or Prediabetic)  07/22/2023    Lipid Screen  07/22/2023    Medicare Yearly Exam  07/23/2023    Breast Cancer Screen Mammogram  05/10/2024    Colorectal Cancer Screening Combo  03/14/2028    Hepatitis C Screening  Completed    Bone Densitometry (Dexa) Screening  Completed    Flu Vaccine  Completed        Review of Systems:  ROS:  Review of Systems   Constitutional: Negative.    HENT: Negative.     Eyes: Negative.    Respiratory: Negative.     Cardiovascular: Negative.    Gastrointestinal: Negative.    Genitourinary: Negative.    Musculoskeletal: Negative.    Skin: Negative.    Neurological: Negative.    Endo/Heme/Allergies: Negative.    Psychiatric/Behavioral: Negative.      ROS otherwise negative      Objective:     Vital Signs:  Visit Vitals  /60 (BP 1 Location: Left upper arm, BP Patient Position: Sitting, BP Cuff Size: Large adult)   Pulse 80   Temp 98.5 °F (36.9 °C) (Oral)   Resp 18   Ht 5' 7\" (1.702 m)   Wt 233 lb 12.8 oz (106.1 kg)   SpO2 97%   BMI 36.62 kg/m²       BMI:  Body mass index is 36.62 kg/m².    Physical Examination:  Physical Exam  Constitutional:       Appearance: Normal appearance. She is obese.   HENT:      Head: Normocephalic and atraumatic.      Right Ear: External ear normal. There is impacted cerumen.      Left Ear: Tympanic membrane and external ear normal. There is no impacted cerumen.      Nose: Nose normal.      Mouth/Throat:      Mouth: Mucous membranes are moist.      Pharynx: Oropharynx is clear. No oropharyngeal exudate or posterior oropharyngeal erythema.   Cardiovascular:      Rate and Rhythm: Normal rate and regular rhythm.      Pulses: Normal pulses.      Heart sounds: Normal heart sounds. No murmur heard.    No friction rub. No gallop.      Comments: Pacemaker / AICD pocket normal  Pulmonary:      Effort: Pulmonary effort is normal. No  respiratory distress.      Breath sounds: Normal breath sounds. No wheezing, rhonchi or rales.   Abdominal:      General: Abdomen is flat. Bowel sounds are normal. There is no distension.      Palpations: Abdomen is soft.      Tenderness: There is no abdominal tenderness. There is no guarding.   Musculoskeletal:         General: No swelling, tenderness or deformity. Normal range of motion.      Cervical back: Normal range of motion and neck supple. No rigidity or tenderness.   Skin:     General: Skin is warm and dry.      Findings: No erythema, lesion or rash.   Neurological:      General: No focal deficit present.      Mental Status: She is alert and oriented to person, place, and time. Mental status is at baseline.      Sensory: No sensory deficit.      Motor: No weakness.      Gait: Gait abnormal.      Deep Tendon Reflexes: Reflexes normal.   Psychiatric:         Mood and Affect: Mood normal.         Behavior: Behavior normal.         Judgment: Judgment normal.        Physical exam otherwise negative    Diagnostic Testing:    Laboratory Studies:  No visits with results within 6 Month(s) from this visit.   Latest known visit with results is:   Office Visit on 07/22/2022   Component Date Value Ref Range Status    Hemoglobin A1c 07/22/2022 6.5 (A)  4.0 - 5.6 % Final    Comment: NEW METHOD  PLEASE NOTE NEW REFERENCE RANGE  (NOTE)  HbA1C Interpretive Ranges  <5.7              Normal  5.7 - 6.4         Consider Prediabetes  >6.5              Consider Diabetes      Est. average glucose 07/22/2022 140  mg/dL Final    WBC 07/22/2022 11.3 (A)  3.6 - 11.0 K/uL Final    RBC 07/22/2022 4.62  3.80 - 5.20 M/uL Final    HGB 07/22/2022 14.2  11.5 - 16.0 g/dL Final    HCT 07/22/2022 45.5  35.0 - 47.0 % Final    MCV 07/22/2022 98.5  80.0 - 99.0 FL Final    MCH 07/22/2022 30.7  26.0 - 34.0 PG Final    MCHC 07/22/2022 31.2  30.0 - 36.5 g/dL Final    RDW 07/22/2022 15.9 (A)  11.5 - 14.5 % Final    PLATELET 07/22/2022 297  150 - 400  K/uL Final    MPV 07/22/2022 11.1  8.9 - 12.9 FL Final    NRBC 07/22/2022 0.0  0  WBC Final    ABSOLUTE NRBC 07/22/2022 0.00  0.00 - 0.01 K/uL Final    NEUTROPHILS 07/22/2022 81 (A)  32 - 75 % Final    LYMPHOCYTES 07/22/2022 12  12 - 49 % Final    MONOCYTES 07/22/2022 5  5 - 13 % Final    EOSINOPHILS 07/22/2022 2  0 - 7 % Final    BASOPHILS 07/22/2022 0  0 - 1 % Final    IMMATURE GRANULOCYTES 07/22/2022 0  0.0 - 0.5 % Final    ABS. NEUTROPHILS 07/22/2022 9.0 (A)  1.8 - 8.0 K/UL Final    ABS. LYMPHOCYTES 07/22/2022 1.3  0.8 - 3.5 K/UL Final    ABS. MONOCYTES 07/22/2022 0.6  0.0 - 1.0 K/UL Final    ABS. EOSINOPHILS 07/22/2022 0.3  0.0 - 0.4 K/UL Final    ABS. BASOPHILS 07/22/2022 0.1  0.0 - 0.1 K/UL Final    ABS. IMM. GRANS. 07/22/2022 0.1 (A)  0.00 - 0.04 K/UL Final    DF 07/22/2022 AUTOMATED    Final    Sodium 07/22/2022 143  136 - 145 mmol/L Final    Potassium 07/22/2022 5.0  3.5 - 5.1 mmol/L Final    Chloride 07/22/2022 106  97 - 108 mmol/L Final    CO2 07/22/2022 31  21 - 32 mmol/L Final    Anion gap 07/22/2022 6  5 - 15 mmol/L Final    Glucose 07/22/2022 198 (A)  65 - 100 mg/dL Final    BUN 07/22/2022 19  6 - 20 MG/DL Final    Creatinine 07/22/2022 1.76 (A)  0.55 - 1.02 MG/DL Final    BUN/Creatinine ratio 07/22/2022 11 (A)  12 - 20   Final    GFR est AA 07/22/2022 35 (A)  >60 ml/min/1.73m2 Final    GFR est non-AA 07/22/2022 29 (A)  >60 ml/min/1.73m2 Final    Estimated GFR is calculated using the IDMS-traceable Modification of Diet in Renal Disease (MDRD) Study equation, reported for both  Americans (GFRAA) and non- Americans (GFRNA), and normalized to 1.73m2 body surface area. The physician must decide which value applies to the patient.    Calcium 07/22/2022 10.1  8.5 - 10.1 MG/DL Final    Bilirubin, total 07/22/2022 0.7  0.2 - 1.0 MG/DL Final    ALT (SGPT) 07/22/2022 23  12 - 78 U/L Final    AST (SGOT) 07/22/2022 17  15 - 37 U/L Final    Alk. phosphatase 07/22/2022 165 (A)  45 - 117 U/L  Final    Protein, total 07/22/2022 7.2  6.4 - 8.2 g/dL Final    Albumin 07/22/2022 3.8  3.5 - 5.0 g/dL Final    Globulin 07/22/2022 3.4  2.0 - 4.0 g/dL Final    A-G Ratio 07/22/2022 1.1  1.1 - 2.2   Final    Cholesterol, total 07/22/2022 114  <200 MG/DL Final    Triglyceride 07/22/2022 147  <150 MG/DL Final    Based on NCEP-ATP III:  Triglycerides <150 mg/dL  is considered normal, 150-199 mg/dL  borderline high,  200-499 mg/dL high and  greater than or equal to 500 mg/dL very high.    HDL Cholesterol 07/22/2022 41  MG/DL Final    Based on NCEP ATP III, HDL Cholesterol <40 mg/dL is considered low and >60 mg/dL is elevated.    LDL, calculated 07/22/2022 43.6  0 - 100 MG/DL Final    Comment: Based on the NCEP-ATP: LDL-C concentrations are considered  optimal <100 mg/dL,  near optimal/above Normal 100-129 mg/dL  Borderline High: 130-159, High: 160-189 mg/dL  Very High: Greater than or equal to 190 mg/dL      VLDL, calculated 07/22/2022 29.4  MG/DL Final    CHOL/HDL Ratio 07/22/2022 2.8  0.0 - 5.0   Final    Microalbumin,urine random 07/22/2022 1.45  MG/DL Final    No reference range has been established.    Creatinine, urine random 07/22/2022 113.00  mg/dL Final    No reference range has been established.    Microalbumin/Creat ratio (mg/g cre* 07/22/2022 13  0 - 30 mg/g Final         Radiographic Studies:  XR Results (most recent):  Results from Hospital Encounter encounter on 09/30/21    XR CHEST PA LAT    Narrative  Exam:  2 view chest    Indication: Preoperative exam.    COMPARISON: 5/19/2021    PA and lateral views demonstrate mild cardiomegaly. ICD is in place. Lungs are  clear acute process. No adenopathy or pleural effusions.    The patient status post vertebroplasty at T12 and L1.    Impression  1. Mild cardiomegaly  2. No acute process     CARLOZ Results (most recent):  Results from Hospital Encounter encounter on 05/10/22    CARLOZ POST BX IMAGING RT INCL CAD    Addendum 5/11/2022  4:27 PM  Addendum:    Addendum to  stereotactic right breast biopsy, performed on 5/10/2022    PATHOLOGY RESULTS: Benign proliferative fibrocystic change with coarse stromal  calcification within fibrosis    RESULTS CONVEYED: Via telephone at 1625 hours EST on 5/11/2022. Patient voiced  her understanding and had no further questions at this time.    ASSESSMENT: These results are concordant with the imaging findings.    RECOMMENDATIONS: Continue annual screening mammograms, next due March 2023.    Narrative  INDICATION: Right breast parenchymal asymmetry seen on recent mammograms  COMPARISON: Previous mammogram, April 26, 2022 and March 31, 2022.  .  PROCEDURE:  After obtaining informed consent, and performing a \"time-out\" procedure, the  patient was placed on the stereotactic table with the right breast in the LM  position.   images were obtained.   Using sterile technique and  approximately 4 cc's lidocaine for local anesthesia, a small skin incision was  made and the vacuum-assisted Suros core needle was advanced into the breast.  Appropriate needle position with respect to the parenchymal asymmetry was  documented with pre and post-fire images.  Multiple cores were taken.  .  A biopsy clip was placed to padmini the biopsy site for future localization  purposes and its position confirmed with post-procedure digital mammograms. The  clip is positioned approximately 2 cm medial to the parenchymal asymmetry.  .  There were no apparent complications.  .    Impression  Stereotactic biopsy with Hydromark clip placement and pathology pending. Clip is  positioned approximately 2 cm medial to the parenchymal asymmetry. Further  recommendations will be based on final pathology results.     CT Results (most recent):  Results from Hospital Encounter encounter on 05/19/21    CT ABD PELV WO CONT    Narrative  EXAM: CT ABD PELV WO CONT    INDICATION: lower abd pain, recurrent falls, weakness    COMPARISON: None    CONTRAST:  None.    TECHNIQUE:  Thin axial  images were obtained through the abdomen and pelvis. Coronal and  sagittal reformats were generated. Oral contrast was not administered. CT dose  reduction was achieved through use of a standardized protocol tailored for this  examination and automatic exposure control for dose modulation.    The absence of intravenous contrast material reduces the sensitivity for  evaluation of the vasculature and solid organs.    FINDINGS:  LOWER THORAX: No significant abnormality in the incidentally imaged lower chest.  LIVER: No mass.  BILIARY TREE: Status post cholecystectomy. CBD is not dilated.  SPLEEN: Spleen is mildly enlarged measuring 13.2 cm.  PANCREAS: No focal abnormality.  ADRENALS: Unremarkable.  KIDNEYS/URETERS: Punctate nonobstructing renal stones are noted bilaterally. No  ureteral stone or hydronephrosis.  STOMACH: Unremarkable.  SMALL BOWEL: No dilatation or wall thickening.  COLON: No dilatation or wall thickening.  APPENDIX: Surgically absent.  PERITONEUM: No ascites or pneumoperitoneum.  RETROPERITONEUM: No lymphadenopathy or aortic aneurysm.  REPRODUCTIVE ORGANS: The uterus is surgically absent.  URINARY BLADDER: No mass or calculus.  BONES: Degenerative changes are seen in the lumbar spine. Kyphoplasty is noted  at T12 and L1.  ABDOMINAL WALL: No mass or hernia.  ADDITIONAL COMMENTS: N/A    Impression  Bilateral nonobstructing renal stones. No ureteral stone or hydronephrosis. No  acute abnormality.     DEXA Results (most recent):  Results from Appointment encounter on 05/14/19    DEXA BONE DENSITY STUDY AXIAL    Narrative  Bone Mineral Density    Indication: postmenopausal  Age: 68  Sex: Female.    Menopause status: Postmenopausal.  Hormone replacement therapy: No    Number of falls in the past year: 1  Risk factors for osteoporosis: None.    Current medication for osteoporosis: None.    Comparison: None.    Technique: Imaging was performed on the [email or web address]. World Health Organization  meta-analysis  fracture risk calculator (FRAX) analysis was performed for 10 year  fracture risk probability assessment    Excluded sites: None    Findings:      Femoral Neck: Left  Bone mineral density (gm/cm2): 0.661  % of peak bone mass: 64  % for age matched controls: 71  T-score: -2.7  Z-score: -1.9    Total Hip: Left  Bone mineral density (gm/cm2): 0.804  % of peak bone mass: 80  % for age matched controls: 84  T-score: -1.6  Z-score: -1.2    Lumbar Spine: L1-L4  Bone mineral density (gm/cm2): 1.118  % of peak bone mass: 94  % for age matched controls: 98  T-score: -0.6  Z-score: -0.2    33% Radius:  Left  Bone mineral density (gm/cm2):  0.636  % of peak bone mass:  72  % for age matched controls:  T4  T-score:  -2.8  Z-score:  -1.3    Impression  Impression:    This patient is osteoporotic using the World Health Organization criteria  10 year probability of major osteoporotic fracture: 13.7%  10 year probability of hip fracture: 3.3%    Recommendations:  Therapy recommendations need to be tailored to each individual patient. Using  the World Health Organization (WHO) FRAX absolute fracture algorithm, the  National Osteoporosis Foundation recommends beginning pharmacological therapy in  postmenopausal women and men over the age of 50 with a 10 year probability of a  hip fracture of >3% OR with the 10 year probability of a major osteoporotic  fracture of >20%.  Please reconsider testing based on risk factors. Currently, Medicare will only  reimburse for a central DXA examination every two years, unless the patient is  on chronic glucocorticoid therapy.    Note: Please note that reliable, valid comparisons cannot be made between  studies which have been performed on machines from different manufacturers. If  clinically warranted, a follow up study performed at this site, on the same  unit, would allow the most sensitive assessment of change in bone mineral  density.     MRI Results (most recent):  No results found for this or  any previous visit.       Assessment/Plan:       ICD-10-CM ICD-9-CM    1. Routine adult health maintenance  Z00.00 V70.0       2. Severe obesity (BMI 35.0-39.9) with comorbidity ([facility])  E66.01 278.01       3. Systolic CHF, chronic ([facility])  I50.22 428.22      428.0       4. Type 2 diabetes with nephropathy ([facility])  E11.21 250.40 HEMOGLOBIN A1C WITH EAG     583.81       5. Chronic atrial fibrillation ([facility])  I48.20 427.31       6. Coronary artery disease of native heart with stable angina pectoris, unspecified vessel or lesion type ([facility])  I25.118 414.01 LIPID PANEL     413.9       7. Stage 3b chronic kidney disease ([facility])  N18.32 585.3 CBC WITH AUTOMATED DIFF      METABOLIC PANEL, COMPREHENSIVE      MICROALBUMIN, UR, RAND W/ MICROALB/CREAT RATIO      8. Hypercholesterolemia  E78.00 272.0                  Healthcare Maintenance:  - Preventive measures are reviewed as per above  - Up to date on routine interventions except as noted above  - Orders placed to update gaps as noted  - Notes: UTD, needs update on PCV20 vaccine      ASCVD:   - Type: CAD   - Current Symptoms: No Symptoms, no angina   - History and Contributing Factors: diabetes, elevated cholesterol, hypertension, and known history of coronary artery disease  Key CAD CHF Meds               carvediloL (COREG) 25 mg tablet (Taking) TAKE 1 TABLET TWICE DAILY WITH MEALS    isosorbide mononitrate ER (IMDUR) 60 mg CR tablet (Taking) TAKE 1 TABLET EVERY DAY    torsemide (DEMADEX) 20 mg tablet (Taking) TAKE 1 TABLET by mouth EVERY DAY    hydrALAZINE (APRESOLINE) 25 mg tablet (Taking) Take 75 mg by mouth three (3) times daily. Indications: high blood pressure    aspirin 81 mg cap (Taking) Take 81 mg by mouth daily.    atorvastatin (LIPITOR) 40 mg tablet (Taking) Take 1 Tab by mouth nightly.            - Target BP: < 130/80 mmHg; see Blood Pressure section   - Statin? YES   - Antiplatelet and/or Anticoagulant? YES   - ACEI/ARB? NO   - Beta Blocker? YES   - Notes: Asx,  [facility]      Essential Hypertension/Blood Pressure Management:   - Home BP Readings: not doing   - Current Control: optimal   - Target BP: <130/80 mmHg   - Relevant BP Meds:  Key CAD CHF Meds               carvediloL (COREG) 25 mg tablet (Taking) TAKE 1 TABLET TWICE DAILY WITH MEALS    isosorbide mononitrate ER (IMDUR) 60 mg CR tablet (Taking) TAKE 1 TABLET EVERY DAY    torsemide (DEMADEX) 20 mg tablet (Taking) TAKE 1 TABLET by mouth EVERY DAY    hydrALAZINE (APRESOLINE) 25 mg tablet (Taking) Take 75 mg by mouth three (3) times daily. Indications: high blood pressure    aspirin 81 mg cap (Taking) Take 81 mg by mouth daily.    atorvastatin (LIPITOR) 40 mg tablet (Taking) Take 1 Tab by mouth nightly.               - Plan: continue current treatment regimen, continue current meds, dietary sodium restriction, regular aerobic exercise, weight loss   - Notes: [facility]      Hyperlipidemia/Dyslipidemia:   - Summary of Cardiovascular Risks and Goals:     LDL goal is under 80  diabetic  existing CAD  hypertension  hyperlipidemia  former smoker   -   Lab Results   Component Value Date/Time    LDL, calculated 43.6 07/22/2022 08:42 AM    HDL Cholesterol 41 07/22/2022 08:42 AM       - Relevant Cholesterol Meds:  Key Antihyperlipidemia Meds               atorvastatin (LIPITOR) 40 mg tablet (Taking) Take 1 Tab by mouth nightly.              - Cholesterol at target: yes   - Does patient meet USPSTF and ACC/AHA indications for pharmacotherapy (e.g., statin): yes   - GI symptoms with meds: NO   - Muscle aches with meds: NO   - Other Adverse effects with meds: NO   - Medication Plan: continue   - Notes: [facility]    Anxiety/Depression:   - Current PHQ: No data recorded    - Current RX:   Key Psychotherapeutic Meds               venlafaxine-SR (EFFEXOR-XR) 150 mg capsule (Taking) Take 150 mg by mouth two (2) times daily (with meals).           Key Neurological Meds               aspirin 81 mg cap (Taking) Take 81 mg by mouth daily.           -  Psychology/Psychiatry Co-managing? No   - intermittent BZD use, stable symptoms, [facility]      Congestive Heart Failure:   - Current meds:    Key CAD CHF Meds               carvediloL (COREG) 25 mg tablet (Taking) TAKE 1 TABLET TWICE DAILY WITH MEALS    isosorbide mononitrate ER (IMDUR) 60 mg CR tablet (Taking) TAKE 1 TABLET EVERY DAY    torsemide (DEMADEX) 20 mg tablet (Taking) TAKE 1 TABLET by mouth EVERY DAY    hydrALAZINE (APRESOLINE) 25 mg tablet (Taking) Take 75 mg by mouth three (3) times daily. Indications: high blood pressure    aspirin 81 mg cap (Taking) Take 81 mg by mouth daily.    atorvastatin (LIPITOR) 40 mg tablet (Taking) Take 1 Tab by mouth nightly.            - Last TTE: 11/21 as noted abaove   - ACEI/ARB (Yes/No): NO   - ARB/ARNI (Yes/No): NO   - Spironolactone (Yes/No): NO   - Beta-blockade (Yes/No): YES   - Statin (Yes/No): NO   - QYLKU9d (Yes/No): NO but strongly consider given DM2 and systolic heart failure   - Diuretic Agents (Yes/No): YES   - Antiplatelet/Anticoagulation (Yes/No): YES   - Cardiology/Heart Failure Clinic Followup (Yes/No): YES   - Current NYHA Class Symptoms: 0   - Baseline Weight: 233   - Most Recent Weights:   Last 3 Recorded Weights in this Encounter    01/25/23 0800   Weight: 233 lb 12.8 oz (106.1 kg)       - Counseling provided: Emphasized salt restriction  Encouraged daily monitoring of the patient's weight  Encouraged regular exercise   - Changes this visit: none   - Notes: [facility]    Chronic Kidney Disease:   - Last Serum Creatinine:   Lab Results   Component Value Date/Time    Creatinine (POC) 1.5 (H) 12/03/2019 01:27 PM    Creatinine 1.76 (H) 07/22/2022 08:42 AM       - Last GFR:   Lab Results   Component Value Date/Time    GFR est AA 35 (L) 07/22/2022 08:42 AM    GFR est non-AA 29 (L) 07/22/2022 08:42 AM      - Current Stage by eGFR: G3B-G4   - Proteinuria: A1   -   Key CKD Meds               potassium chloride (KLOR-CON M10) 10 mEq tablet (Taking) TAKE 1 TABLET FOUR TIMES  DAILY    torsemide (DEMADEX) 20 mg tablet (Taking) TAKE 1 TABLET by mouth EVERY DAY    cholecalciferol (VITAMIN D3) 25 mcg (1,000 unit) cap (Taking) Take  by mouth daily.    cholecalciferol (VITAMIN D3) 25 mcg (1,000 unit) cap Take 1,000 Units by mouth daily.           - Blood Pressure Target: See Hypertension/Blood Pressure Management Section   - Advised avoidance of NSAIDs and other nephrotoxins wherever possible   - Advised renal dosing of medications where necessary   - Acid/Base Management: ---   - Phosphorus Management: VitD   - Nephrology Consultation: ongoing, Dr. Dockery   - Notes: [facility]    Diabetes Mellitus:   - Type: Type 2 Diabetes   - Current A1C:   Lab Results   Component Value Date/Time    Hemoglobin A1c 6.5 (H) 07/22/2022 08:42 AM    Hemoglobin A1c (POC) 6.1 (A) 01/22/2018 01:42 PM       - Target A1C: <7%   - Complications: nephropathy   - Relevant Diabetes Medications:  Key Antihyperglycemic Medications       Patient is on no antihyperglycemic meds.              - General Recommendations discussed today: ---   - Notes: [facility]          I have reviewed the patient's medical history in detail and updated the computerized patient record.      We had a prolonged discussion about these complex clinical issues and went over the various important aspects to consider. All questions were answered.      Advised the patient to call back or return to office if symptoms do not improve, change in nature, or persist.     The patient was given an after visit summary or informed of [employer] which includes patient instructions, diagnoses, current medications, & vitals.  she expressed understanding with the diagnosis and plan.     Damian Loja MD    Please note that this dictation was completed with Manager[employer], the [employer] voice recognition software.  Quite often unanticipated grammatical, syntax, homophones, and other interpretive errors are inadvertently transcribed by the computer software.  Please disregard these  errors.  Please excuse any errors that have escaped final proofreading.

## 2023-01-06 NOTE — PROGRESS NOTES
ICD-10-CM ICD-9-CM    1. Routine adult health maintenance  Z00.00 V70.0       2. Severe obesity (BMI 35.0-39. 9) with comorbidity ([facility]  E66.01 278.01       3. Systolic CHF, chronic (HCC)  I50.22 428.22      428.0       4. Type 2 diabetes with nephropathy ([facility])  E11.21 250.40 HEMOGLOBIN A1C WITH EAG     583.81       5. Chronic atrial fibrillation ([facility])  I48.20 427.31       6. Coronary artery disease of native heart with stable angina pectoris, unspecified vessel or lesion type ([facility]  I25.118 414.01 LIPID PANEL     413.9       7. Stage 3b chronic kidney disease ([facility])  N18.32 585.3 CBC WITH AUTOMATED DIFF      METABOLIC PANEL, COMPREHENSIVE      MICROALBUMIN, UR, RAND W/ MICROALB/CREAT RATIO      8. Hypercholesterolemia  E78.00 272.0                Subjective:     Chief Complaint   Patient presents with    Follow-up       Dante Headley is a 81 y.o. F.  She has a past medical history of coronary artery disease with chronic systolic heart failure as well as chronic kidney disease, among other numerous medical problems. I reviewed and updated the medical record. I saw this patient most recently in July for establishment. At the time, the patient had recently undergone watchman placement for a history of chronic atrial fibrillation. She was feeling generally okay. She had complained of a sensation of stickiness and discharge on her tongue that had developed over the previous few weeks. Physical examination has adjusted the possibility of thrush. She was otherwise using Imdur and hydralazine for her heart failure, and using Klonopin on a regular basis for a history of anxiety. Physical examination was notable for severe obesity with a BMI of 41 kg/m², along with bilateral lower extremity edema with venous stasis changes. She was using a Rollator. Laboratory studies were ordered, and she was given a prescription for nystatin swish and swallow for thrush. Today, the patient comes in for routine follow-up. She is accompanied by her  who confirms aspects of the history. Together, they report that the patient has been doing generally well over the past 1 months. She saw her cardiologist and nephrologist both recently, and was felt by both of those to be doing generally well. Laboratory studies by her nephrologist apparently had demonstrated stable kidney function back in December, although the results of this are not available to me for review. She denies having had any recent change in her exercise tolerance, although she is generally rather sedentary at baseline. She typically walks with the use of her Rollator and continue to do so. She denies having had any recent chest pain, shortness of breath, or orthopnea or paroxysmal nocturnal dyspnea. Overall her breathing has been stable and she has not had to resort to rescue inhaler after using Symbicort. She says that the thrush that she had been having before has completely resolved, although this took a while while using the nystatin swish and swallow. She has been checking her blood glucose readings about a couple times per week and typically gets readings in the 120 mg/dL range without any hypoglycemic episodes or polyuria or polydipsia. She does not currently take any medications for diabetes. Her review of systems is otherwise negative. Routine Healthcare Maintenance issues are reviewed and discussed with the patient as noted below. Orders to update gaps in healthcare maintenance were placed as noted below in the Assessment and Plan, where applicable.      Past Medical History:  Past Medical History:   Diagnosis Date    Age-related osteoporosis without current pathological fracture     Anxiety and depression 01/22/2018    Arthritis     OA    Asthma     CAD (coronary artery disease)     Chronic systolic heart failure (HCC)     CKD stage G3b/A1, GFR 30-44 and albumin creatinine ratio <30 mg/g (HCC)     Essential hypertension     GERD (gastroesophageal reflux disease)     History of diverticulitis     diverticulitis    History of echocardiogram 11/2021    LV: Estimated LVEF is 40 - 45%. Visually measured ejection fraction. Normal cavity size and wall thickness. Globally reduced systolic function. LA: Moderately dilated left atrium. Left atrial appendage is completely occluded. A Watchman left atrial appendage occluder is present and completely occludes the appendage. History of migraine     Hypercholesterolemia 01/22/2018    Irritable bowel syndrome     IBS, spinal stenosis    Long-term use of high-risk medication 01/22/2018    Lumbar spinal stenosis     Morbid (severe) obesity due to excess calories (HCC)     Neuropathy     Obesity (BMI 30-39.9) 04/30/2019    Obstructive sleep apnea     uses CPAP    Permanent atrial fibrillation (HCC)     Presence of implantable cardioverter-defibrillator (ICD)     Presence of Watchman left atrial appendage closure device     Type 2 diabetes mellitus with diabetic neuropathy, without long-term current use of insulin ([facility] 06/05/2016    Venous insufficiency     Vitamin B12 deficiency        Past Surgical Histor:  Past Surgical History:   Procedure Laterality Date    COLONOSCOPY N/A 3/14/2018    COLONOSCOPY performed by Tootie Balderas MD at \[facility]\"" ENDOSCOPY    HX APPENDECTOMY      HX BREAST BIOPSY Left     about 4-5 years ago from 2022, neg    HX CHOLECYSTECTOMY  11/15/2018    HX HYSTERECTOMY      HX PACEMAKER      IR KYPHOPLASTY LUMBAR  12/22/2020    IR KYPHOPLASTY THORACIC  1/6/2021    HI UNLISTED PROCEDURE CARDIAC SURGERY      stent       Allergies:   Allergies   Allergen Reactions    Sulfa (Sulfonamide Antibiotics) Swelling    Amoxicillin Swelling       Medications:  Current Outpatient Medications   Medication Sig Dispense Refill    clonazePAM (KlonoPIN) 0.5 mg tablet Take 1 tablet by mouth nightly 90 Tablet 0    potassium chloride (KLOR-CON M10) 10 mEq tablet TAKE 1 TABLET FOUR TIMES DAILY 360 Tablet 1 carvediloL (COREG) 25 mg tablet TAKE 1 TABLET TWICE DAILY WITH MEALS 180 Tablet 1    omeprazole (PRILOSEC) 40 mg capsule TAKE 1 CAPSULE EVERY DAY 90 Capsule 1    isosorbide mononitrate ER (IMDUR) 60 mg CR tablet TAKE 1 TABLET EVERY DAY 90 Tablet 3    nystatin-triamcinolone (MYCOLOG) 100,000-0.1 unit/gram-% ointment Apply  to affected area two (2) times a day. 30 g 3    triamcinolone acetonide (KENALOG) 0.1 % topical cream APPLY A THIN FILM OF CREAM EXTERNALLY TO THE AFFECTED AREA TWICE DAILY 15 g 3    torsemide (DEMADEX) 20 mg tablet TAKE 1 TABLET by mouth EVERY DAY 90 Tablet 3    L. acidophilus/Strept/La p-glenn (JONNIE-Q,2, PO) Take  by mouth daily. 16 billion cell cap      cholecalciferol (VITAMIN D3) 25 mcg (1,000 unit) cap Take  by mouth daily. acetaminophen (TYLENOL) 500 mg tablet Take 500 mg by mouth every six (6) hours as needed for Pain.      polyethylene glycol (MIRALAX) 17 gram packet Take 17 g by mouth daily as needed for Constipation. clotrimazole-betamethasone (LOTRISONE) topical cream Apply  to affected area two (2) times a day. hydrALAZINE (APRESOLINE) 25 mg tablet Take 75 mg by mouth three (3) times daily. Indications: high blood pressure      magnesium oxide (MAG-OX) 400 mg tablet Take 1 tablet by mouth twice daily (Patient taking differently: daily. ) 180 Tab 3    SYMBICORT 160-4.5 mcg/actuation HFAA INHALE 2 PUFFS BY MOUTH TWICE DAILY (Patient taking differently: 2 Puffs daily as needed.) 1 Inhaler 3    albuterol (PROVENTIL HFA, VENTOLIN HFA, PROAIR HFA) 90 mcg/actuation inhaler Take 1 Puff by inhalation every four (4) hours as needed for Wheezing. (Patient taking differently: Take 1 Puff by inhalation two (2) times daily as needed for Wheezing.) 1 Inhaler 6    sodium chloride (OCEAN) 0.65 % nasal squeeze bottle 2 Sprays by Both Nostrils route every eight (8) hours as needed. conjugated estrogens (PREMARIN) 0.625 mg/gram vaginal cream Insert 0.5 g into vagina two (2) times a week. Using Daily      venlafaxine-SR (EFFEXOR-XR) 150 mg capsule Take 150 mg by mouth two (2) times daily (with meals). aspirin 81 mg cap Take 81 mg by mouth daily. atorvastatin (LIPITOR) 40 mg tablet Take 1 Tab by mouth nightly. 30 Tab 12    cholecalciferol (VITAMIN D3) 25 mcg (1,000 unit) cap Take 1,000 Units by mouth daily.          Social History:  Social History     Socioeconomic History    Marital status:    Tobacco Use    Smoking status: Never    Smokeless tobacco: Never   Vaping Use    Vaping Use: Never used   Substance and Sexual Activity    Alcohol use: No    Drug use: No       Family History:  Family History   Problem Relation Age of Onset    OSTEOARTHRITIS Mother     Hypertension Mother     High Cholesterol Mother     Crohn's Disease Mother     Heart Disease Mother     Alcohol abuse Father     High Cholesterol Sister     Hypertension Sister     Thyroid Disease Sister     COPD Sister     High Cholesterol Brother     Hypertension Brother     COPD Brother     COPD Child     Inflammatory Bowel Dz Child        Immunizations:  Immunization History   Administered Date(s) Administered     Influenza, Ruben Narrow (age 72 y+), High Dose 10/20/2020    COVID-19, PFIZER PURPLE top, DILUTE for use, (age 15 y+), IM, 30mcg/0.3mL 03/13/2021, 04/03/2021, 11/01/2021    Influenza High Dose Vaccine PF 09/20/2017, 10/11/2019, 11/01/2021, 11/07/2022    Influenza Vaccine 10/01/2016    Influenza, FLUARIX, FLULAVAL, FLUZONE (age 10 mo+) AND AFLURIA, (age 1 y+), PF, 0.5mL 10/17/2018    Pneumococcal Conjugate (PCV-13) 10/11/2019        Healthcare Maintenance:  Health Maintenance   Topic Date Due    DTaP/Tdap/Td series (1 - Tdap) Never done    Shingles Vaccine (1 of 2) Never done    Pneumococcal 65+ years (2 - PPSV23 if available, else PCV20) 10/11/2020    Eye Exam Retinal or Dilated  12/11/2021    COVID-19 Vaccine (4 - Booster for Pfizer series) 12/27/2021    Foot Exam Q1  03/01/2023    Diabetic Alb to Cr ratio (uACR) test 07/22/2023    GFR test (Diabetes, CKD 3-4, OR last GFR 15-59)  07/22/2023    Depression Monitoring  07/22/2023    A1C test (Diabetic or Prediabetic)  07/22/2023    Lipid Screen  07/22/2023    Medicare Yearly Exam  07/23/2023    Breast Cancer Screen Mammogram  05/10/2024    Colorectal Cancer Screening Combo  03/14/2028    Hepatitis C Screening  Completed    Bone Densitometry (Dexa) Screening  Completed    Flu Vaccine  Completed        Review of Systems:  ROS:  Review of Systems   Constitutional: Negative. HENT: Negative. Eyes: Negative. Respiratory: Negative. Cardiovascular: Negative. Gastrointestinal: Negative. Genitourinary: Negative. Musculoskeletal: Negative. Skin: Negative. Neurological: Negative. Endo/Heme/Allergies: Negative. Psychiatric/Behavioral: Negative. ROS otherwise negative      Objective:     Vital Signs:  Visit Vitals  /60 (BP 1 Location: Left upper arm, BP Patient Position: Sitting, BP Cuff Size: Large adult)   Pulse 80   Temp 98.5 °F (36.9 °C) (Oral)   Resp 18   Ht 5' 7\" (1.702 m)   Wt 233 lb 12.8 oz (106.1 kg)   SpO2 97%   BMI 36.62 kg/m²       BMI:  Body mass index is 36.62 kg/m². Physical Examination:  Physical Exam  Constitutional:       Appearance: Normal appearance. She is obese. HENT:      Head: Normocephalic and atraumatic. Right Ear: External ear normal. There is impacted cerumen. Left Ear: Tympanic membrane and external ear normal. There is no impacted cerumen. Nose: Nose normal.      Mouth/Throat:      Mouth: Mucous membranes are moist.      Pharynx: Oropharynx is clear. No oropharyngeal exudate or posterior oropharyngeal erythema. Cardiovascular:      Rate and Rhythm: Normal rate and regular rhythm. Pulses: Normal pulses. Heart sounds: Normal heart sounds. No murmur heard. No friction rub. No gallop.       Comments: Pacemaker / AICD pocket normal  Pulmonary:      Effort: Pulmonary effort is normal. No respiratory distress. Breath sounds: Normal breath sounds. No wheezing, rhonchi or rales. Abdominal:      General: Abdomen is flat. Bowel sounds are normal. There is no distension. Palpations: Abdomen is soft. Tenderness: There is no abdominal tenderness. There is no guarding. Musculoskeletal:         General: No swelling, tenderness or deformity. Normal range of motion. Cervical back: Normal range of motion and neck supple. No rigidity or tenderness. Skin:     General: Skin is warm and dry. Findings: No erythema, lesion or rash. Neurological:      General: No focal deficit present. Mental Status: She is alert and oriented to person, place, and time. Mental status is at baseline. Sensory: No sensory deficit. Motor: No weakness. Gait: Gait abnormal.      Deep Tendon Reflexes: Reflexes normal.   Psychiatric:         Mood and Affect: Mood normal.         Behavior: Behavior normal.         Judgment: Judgment normal.        Physical exam otherwise negative    Diagnostic Testing:    Laboratory Studies:  No visits with results within 6 Month(s) from this visit.    Latest known visit with results is:   Office Visit on 07/22/2022   Component Date Value Ref Range Status    Hemoglobin A1c 07/22/2022 6.5 (A)  4.0 - 5.6 % Final    Comment: NEW METHOD  PLEASE NOTE NEW REFERENCE RANGE  (NOTE)  HbA1C Interpretive Ranges  <5.7              Normal  5.7 - 6.4         Consider Prediabetes  >6.5              Consider Diabetes      Est. average glucose 07/22/2022 140  mg/dL Final    WBC 07/22/2022 11.3 (A)  3.6 - 11.0 K/uL Final    RBC 07/22/2022 4.62  3.80 - 5.20 M/uL Final    HGB 07/22/2022 14.2  11.5 - 16.0 g/dL Final    HCT 07/22/2022 45.5  35.0 - 47.0 % Final    MCV 07/22/2022 98.5  80.0 - 99.0 FL Final    MCH 07/22/2022 30.7  26.0 - 34.0 PG Final    MCHC 07/22/2022 31.2  30.0 - 36.5 g/dL Final    RDW 07/22/2022 15.9 (A)  11.5 - 14.5 % Final    PLATELET 47/03/9407 296  150 - 400 K/uL Final    MPV 07/22/2022 11.1  8.9 - 12.9 FL Final    NRBC 07/22/2022 0.0  0  WBC Final    ABSOLUTE NRBC 07/22/2022 0.00  0.00 - 0.01 K/uL Final    NEUTROPHILS 07/22/2022 81 (A)  32 - 75 % Final    LYMPHOCYTES 07/22/2022 12  12 - 49 % Final    MONOCYTES 07/22/2022 5  5 - 13 % Final    EOSINOPHILS 07/22/2022 2  0 - 7 % Final    BASOPHILS 07/22/2022 0  0 - 1 % Final    IMMATURE GRANULOCYTES 07/22/2022 0  0.0 - 0.5 % Final    ABS. NEUTROPHILS 07/22/2022 9.0 (A)  1.8 - 8.0 K/UL Final    ABS. LYMPHOCYTES 07/22/2022 1.3  0.8 - 3.5 K/UL Final    ABS. MONOCYTES 07/22/2022 0.6  0.0 - 1.0 K/UL Final    ABS. EOSINOPHILS 07/22/2022 0.3  0.0 - 0.4 K/UL Final    ABS. BASOPHILS 07/22/2022 0.1  0.0 - 0.1 K/UL Final    ABS. IMM. GRANS. 07/22/2022 0.1 (A)  0.00 - 0.04 K/UL Final    DF 07/22/2022 AUTOMATED    Final    Sodium 07/22/2022 143  136 - 145 mmol/L Final    Potassium 07/22/2022 5.0  3.5 - 5.1 mmol/L Final    Chloride 07/22/2022 106  97 - 108 mmol/L Final    CO2 07/22/2022 31  21 - 32 mmol/L Final    Anion gap 07/22/2022 6  5 - 15 mmol/L Final    Glucose 07/22/2022 198 (A)  65 - 100 mg/dL Final    BUN 07/22/2022 19  6 - 20 MG/DL Final    Creatinine 07/22/2022 1.76 (A)  0.55 - 1.02 MG/DL Final    BUN/Creatinine ratio 07/22/2022 11 (A)  12 - 20   Final    GFR est AA 07/22/2022 35 (A)  >60 ml/min/1.73m2 Final    GFR est non-AA 07/22/2022 29 (A)  >60 ml/min/1.73m2 Final    Estimated GFR is calculated using the IDMS-traceable Modification of Diet in Renal Disease (MDRD) Study equation, reported for both  Americans (GFRAA) and non- Americans (GFRNA), and normalized to 1.73m2 body surface area. The physician must decide which value applies to the patient. Calcium 07/22/2022 10.1  8.5 - 10.1 MG/DL Final    Bilirubin, total 07/22/2022 0.7  0.2 - 1.0 MG/DL Final    ALT (SGPT) 07/22/2022 23  12 - 78 U/L Final    AST (SGOT) 07/22/2022 17  15 - 37 U/L Final    Alk.  phosphatase 07/22/2022 165 (A)  45 - 117 U/L Final    Protein, total 07/22/2022 7.2  6.4 - 8.2 g/dL Final    Albumin 07/22/2022 3.8  3.5 - 5.0 g/dL Final    Globulin 07/22/2022 3.4  2.0 - 4.0 g/dL Final    A-G Ratio 07/22/2022 1.1  1.1 - 2.2   Final    Cholesterol, total 07/22/2022 114  <200 MG/DL Final    Triglyceride 07/22/2022 147  <150 MG/DL Final    Based on NCEP-ATP III:  Triglycerides <150 mg/dL  is considered normal, 150-199 mg/dL  borderline high,  200-499 mg/dL high and  greater than or equal to 500 mg/dL very high. HDL Cholesterol 07/22/2022 41  MG/DL Final    Based on NCEP ATP III, HDL Cholesterol <40 mg/dL is considered low and >60 mg/dL is elevated. LDL, calculated 07/22/2022 43.6  0 - 100 MG/DL Final    Comment: Based on the NCEP-ATP: LDL-C concentrations are considered  optimal <100 mg/dL,  near optimal/above Normal 100-129 mg/dL  Borderline High: 130-159, High: 160-189 mg/dL  Very High: Greater than or equal to 190 mg/dL      VLDL, calculated 07/22/2022 29.4  MG/DL Final    CHOL/HDL Ratio 07/22/2022 2.8  0.0 - 5.0   Final    Microalbumin,urine random 07/22/2022 1.45  MG/DL Final    No reference range has been established. Creatinine, urine random 07/22/2022 113.00  mg/dL Final    No reference range has been established. Microalbumin/Creat ratio (mg/g cre* 07/22/2022 13  0 - 30 mg/g Final         Radiographic Studies:  XR Results (most recent):  Results from [place] encounter on 09/30/21    XR CHEST PA LAT    Narrative  Exam:  2 view chest    Indication: Preoperative exam.    COMPARISON: 5/19/2021    PA and lateral views demonstrate mild cardiomegaly. ICD is in place. Lungs are  clear acute process. No adenopathy or pleural effusions. The patient status post vertebroplasty at T12 and L1. Impression  1. Mild cardiomegaly  2.  No acute process     CARLOZ Results (most recent):  Results from [place] encounter on 05/10/22    CARLOZ POST BX IMAGING RT INCL CAD    Addendum 5/11/2022  4:27 PM  Addendum:    Addendum to stereotactic right breast biopsy, performed on 5/10/2022    PATHOLOGY RESULTS: Benign proliferative fibrocystic change with coarse stromal  calcification within fibrosis    RESULTS CONVEYED: Via telephone at 1625 hours EST on 5/11/2022. Patient voiced  her understanding and had no further questions at this time. ASSESSMENT: These results are concordant with the imaging findings. RECOMMENDATIONS: Continue annual screening mammograms, next due March 2023. Narrative  INDICATION: Right breast parenchymal asymmetry seen on recent mammograms  COMPARISON: Previous mammogram, April 26, 2022 and March 31, 2022. Nelly Solis PROCEDURE:  After obtaining informed consent, and performing a \"time-out\" procedure, the  patient was placed on the stereotactic table with the right breast in the LM  position.  images were obtained. Using sterile technique and  approximately 4 cc's lidocaine for local anesthesia, a small skin incision was  made and the vacuum-assisted Suros core needle was advanced into the breast.  Appropriate needle position with respect to the parenchymal asymmetry was  documented with pre and post-fire images. Multiple cores were taken. .  A biopsy clip was placed to padmini the biopsy site for future localization  purposes and its position confirmed with post-procedure digital mammograms. The  clip is positioned approximately 2 cm medial to the parenchymal asymmetry. .  There were no apparent complications. .    Impression  Stereotactic biopsy with Hydromark clip placement and pathology pending. Clip is  positioned approximately 2 cm medial to the parenchymal asymmetry. Further  recommendations will be based on final pathology results. CT Results (most recent):  Results from Hospital Encounter encounter on 05/19/21    CT ABD PELV WO CONT    Narrative  EXAM: CT ABD PELV WO CONT    INDICATION: lower abd pain, recurrent falls, weakness    COMPARISON: None    CONTRAST:  None.     TECHNIQUE:  Thin axial images were obtained through the abdomen and pelvis. Coronal and  sagittal reformats were generated. Oral contrast was not administered. CT dose  reduction was achieved through use of a standardized protocol tailored for this  examination and automatic exposure control for dose modulation. The absence of intravenous contrast material reduces the sensitivity for  evaluation of the vasculature and solid organs. FINDINGS:  LOWER THORAX: No significant abnormality in the incidentally imaged lower chest.  LIVER: No mass. BILIARY TREE: Status post cholecystectomy. CBD is not dilated. SPLEEN: Spleen is mildly enlarged measuring 13.2 cm. PANCREAS: No focal abnormality. ADRENALS: Unremarkable. KIDNEYS/URETERS: Punctate nonobstructing renal stones are noted bilaterally. No  ureteral stone or hydronephrosis. STOMACH: Unremarkable. SMALL BOWEL: No dilatation or wall thickening. COLON: No dilatation or wall thickening. APPENDIX: Surgically absent. PERITONEUM: No ascites or pneumoperitoneum. RETROPERITONEUM: No lymphadenopathy or aortic aneurysm. REPRODUCTIVE ORGANS: The uterus is surgically absent. URINARY BLADDER: No mass or calculus. BONES: Degenerative changes are seen in the lumbar spine. Kyphoplasty is noted  at T12 and L1.  ABDOMINAL WALL: No mass or hernia. ADDITIONAL COMMENTS: N/A    Impression  Bilateral nonobstructing renal stones. No ureteral stone or hydronephrosis. No  acute abnormality. DEXA Results (most recent):  Results from Appointment encounter on 05/14/19    DEXA BONE DENSITY STUDY AXIAL    Narrative  Bone Mineral Density    Indication: postmenopausal  Age: 79  Sex: Female. Menopause status: Postmenopausal.  Hormone replacement therapy: No    Number of falls in the past year: 1  Risk factors for osteoporosis: None. Current medication for osteoporosis: None. Comparison: None. Technique: Imaging was performed on the [employer].  World Health Organization  meta-analysis fracture risk calculator (FRAX) analysis was performed for 10 year  fracture risk probability assessment    Excluded sites: None    Findings:      Femoral Neck: Left  Bone mineral density (gm/cm2): 0.661  % of peak bone mass: 64  % for age matched controls: 70  T-score: -2.7  Z-score: -1.9    Total Hip: Left  Bone mineral density (gm/cm2): 0.804  % of peak bone mass: 80  % for age matched controls: 80  T-score: -1.6  Z-score: -1.2    Lumbar Spine: L1-L4  Bone mineral density (gm/cm2): 1.118  % of peak bone mass: 94  % for age matched controls: 80  T-score: -0.6  Z-score: -0.2    33% Radius:  Left  Bone mineral density (gm/cm2):  0.636  % of peak bone mass:  72  % for age matched controls:  T4  T-score:  -2.8  Z-score:  -1.3    Impression  Impression: This patient is osteoporotic using the World Health Organization criteria  10 year probability of major osteoporotic fracture: 13.7%  10 year probability of hip fracture: 3.3%    Recommendations:  Therapy recommendations need to be tailored to each individual patient. Using  the [facility]) FRAX absolute fracture algorithm, the  [facility] recommends beginning pharmacological therapy in  postmenopausal women and men over the age of 48 with a 8 year probability of a  hip fracture of >3% OR with the 10 year probability of a major osteoporotic  fracture of >20%. Please reconsider testing based on risk factors. Currently, Medicare will only  reimburse for a central DXA examination every two years, unless the patient is  on chronic glucocorticoid therapy. Note: Please note that reliable, valid comparisons cannot be made between  studies which have been performed on machines from different manufacturers. If  clinically warranted, a follow up study performed at this site, on the same  unit, would allow the most sensitive assessment of change in bone mineral  density.      MRI Results (most recent):  No results found for this or any previous visit. Assessment/Plan:       ICD-10-CM ICD-9-CM    1. Routine adult health maintenance  Z00.00 V70.0       2. Severe obesity (BMI 35.0-39. 9) with comorbidity ([facility]  E66.01 278.01       3. Systolic CHF, chronic (HCC)  I50.22 428.22      428.0       4. Type 2 diabetes with nephropathy ([facility])  E11.21 250.40 HEMOGLOBIN A1C WITH EAG     583.81       5. Chronic atrial fibrillation ([facility])  I48.20 427.31       6. Coronary artery disease of native heart with stable angina pectoris, unspecified vessel or lesion type ([facility]  I25.118 414.01 LIPID PANEL     413.9       7. Stage 3b chronic kidney disease ([facility])  N18.32 585.3 CBC WITH AUTOMATED DIFF      METABOLIC PANEL, COMPREHENSIVE      MICROALBUMIN, UR, RAND W/ MICROALB/CREAT RATIO      8. Hypercholesterolemia  E78.00 272.0                  Healthcare Maintenance:  - Preventive measures are reviewed as per above  - Up to date on routine interventions except as noted above  - Orders placed to update gaps as noted  - Notes: UTD, needs update on PCV20 vaccine      ASCVD:   - Type: CAD   - Current Symptoms: No Symptoms, no angina   - History and Contributing Factors: diabetes, elevated cholesterol, hypertension, and known history of coronary artery disease  Key CAD CHF Meds               carvediloL (COREG) 25 mg tablet (Taking) TAKE 1 TABLET TWICE DAILY WITH MEALS    isosorbide mononitrate ER (IMDUR) 60 mg CR tablet (Taking) TAKE 1 TABLET EVERY DAY    torsemide (DEMADEX) 20 mg tablet (Taking) TAKE 1 TABLET by mouth EVERY DAY    hydrALAZINE (APRESOLINE) 25 mg tablet (Taking) Take 75 mg by mouth three (3) times daily. Indications: high blood pressure    aspirin 81 mg cap (Taking) Take 81 mg by mouth daily. atorvastatin (LIPITOR) 40 mg tablet (Taking) Take 1 Tab by mouth nightly. - Target BP: < 130/80 mmHg; see Blood Pressure section   - Statin? YES   - Antiplatelet and/or Anticoagulant? YES   - ACEI/ARB? NO   - Beta Blocker?  YES   - Notes: Asx, [facility]      Essential Hypertension/Blood Pressure Management:   - Home BP Readings: not doing   - Current Control: optimal   - Target BP: <130/80 mmHg   - Relevant BP Meds:  Key CAD CHF Meds               carvediloL (COREG) 25 mg tablet (Taking) TAKE 1 TABLET TWICE DAILY WITH MEALS    isosorbide mononitrate ER (IMDUR) 60 mg CR tablet (Taking) TAKE 1 TABLET EVERY DAY    torsemide (DEMADEX) 20 mg tablet (Taking) TAKE 1 TABLET by mouth EVERY DAY    hydrALAZINE (APRESOLINE) 25 mg tablet (Taking) Take 75 mg by mouth three (3) times daily. Indications: high blood pressure    aspirin 81 mg cap (Taking) Take 81 mg by mouth daily. atorvastatin (LIPITOR) 40 mg tablet (Taking) Take 1 Tab by mouth nightly. - Plan: continue current treatment regimen, continue current meds, dietary sodium restriction, regular aerobic exercise, weight loss   - Notes: [facility]      Hyperlipidemia/Dyslipidemia:   - Summary of Cardiovascular Risks and Goals:     LDL goal is under 80  diabetic  existing CAD  hypertension  hyperlipidemia  former smoker   -   Lab Results   Component Value Date/Time    LDL, calculated 43.6 07/22/2022 08:42 AM    HDL Cholesterol 41 07/22/2022 08:42 AM       - Relevant Cholesterol Meds:  Key Antihyperlipidemia Meds               atorvastatin (LIPITOR) 40 mg tablet (Taking) Take 1 Tab by mouth nightly. - Cholesterol at target: yes   - Does patient meet USPSTF and ACC/AHA indications for pharmacotherapy (e.g., statin): yes   - GI symptoms with meds: NO   - Muscle aches with meds: NO   - Other Adverse effects with meds: NO   - Medication Plan: continue   - Notes: [facility]    Anxiety/Depression:   - Current PHQ: No data recorded    - Current RX:   Key Psychotherapeutic Meds               venlafaxine-SR (EFFEXOR-XR) 150 mg capsule (Taking) Take 150 mg by mouth two (2) times daily (with meals). Key Neurological Meds               aspirin 81 mg cap (Taking) Take 81 mg by mouth daily.            - Psychology/Psychiatry Co-managing? No   - intermittent BZD use, stable symptoms, [facility]      Congestive Heart Failure:   - Current meds:    Key CAD CHF Meds               carvediloL (COREG) 25 mg tablet (Taking) TAKE 1 TABLET TWICE DAILY WITH MEALS    isosorbide mononitrate ER (IMDUR) 60 mg CR tablet (Taking) TAKE 1 TABLET EVERY DAY    torsemide (DEMADEX) 20 mg tablet (Taking) TAKE 1 TABLET by mouth EVERY DAY    hydrALAZINE (APRESOLINE) 25 mg tablet (Taking) Take 75 mg by mouth three (3) times daily. Indications: high blood pressure    aspirin 81 mg cap (Taking) Take 81 mg by mouth daily. atorvastatin (LIPITOR) 40 mg tablet (Taking) Take 1 Tab by mouth nightly.             - Last TTE: 11/21 as noted abaove   - ACEI/ARB (Yes/No): NO   - ARB/ARNI (Yes/No): NO   - Spironolactone (Yes/No): NO   - Beta-blockade (Yes/No): YES   - Statin (Yes/No): NO   - BENLG1k (Yes/No): NO but strongly consider given DM2 and systolic heart failure   - Diuretic Agents (Yes/No): YES   - Antiplatelet/Anticoagulation (Yes/No): YES   - Cardiology/Heart Failure Clinic Followup (Yes/No): YES   - Current NYHA Class Symptoms: 0   - Baseline Weight: 233   - Most Recent Weights:   Last 3 Recorded Weights in this Encounter    01/25/23 0800   Weight: 233 lb 12.8 oz (106.1 kg)       - Counseling provided: Emphasized salt restriction  Encouraged daily monitoring of the patient's weight  Encouraged regular exercise   - Changes this visit: none   - Notes: [facility]    Chronic Kidney Disease:   - Last Serum Creatinine:   Lab Results   Component Value Date/Time    Creatinine (POC) 1.5 (H) 12/03/2019 01:27 PM    Creatinine 1.76 (H) 07/22/2022 08:42 AM       - Last GFR:   Lab Results   Component Value Date/Time    GFR est AA 35 (L) 07/22/2022 08:42 AM    GFR est non-AA 29 (L) 07/22/2022 08:42 AM      - Current Stage by eGFR: G3B-G4   - Proteinuria: A1   -   Key CKD Meds               potassium chloride (KLOR-CON M10) 10 mEq tablet (Taking) TAKE 1 TABLET FOUR TIMES DAILY    torsemide (DEMADEX) 20 mg tablet (Taking) TAKE 1 TABLET by mouth EVERY DAY    cholecalciferol (VITAMIN D3) 25 mcg (1,000 unit) cap (Taking) Take  by mouth daily. cholecalciferol (VITAMIN D3) 25 mcg (1,000 unit) cap Take 1,000 Units by mouth daily.           - Blood Pressure Target: See Hypertension/Blood Pressure Management Section   - Advised avoidance of NSAIDs and other nephrotoxins wherever possible   - Advised renal dosing of medications where necessary   - Acid/Base Management: ---   - Phosphorus Management: VitD   - Nephrology Consultation: ongoing, Dr. Mahamed Peng   - Notes: [facility]    Diabetes Mellitus:   - Type: Type 2 Diabetes   - Current A1C:   Lab Results   Component Value Date/Time    Hemoglobin A1c 6.5 (H) 07/22/2022 08:42 AM    Hemoglobin A1c (POC) 6.1 (A) 01/22/2018 01:42 PM       - Target Q5X: <0%   - Complications: nephropathy   - Relevant Diabetes Medications:  Key Antihyperglycemic Medications       Patient is on no antihyperglycemic meds. - General Recommendations discussed today: ---   - Notes: [facility]          I have reviewed the patient's medical history in detail and updated the computerized patient record. We had a prolonged discussion about these complex clinical issues and went over the various important aspects to consider. All questions were answered. Advised the patient to call back or return to office if symptoms do not improve, change in nature, or persist.     The patient was given an after visit summary or informed of [employer] which includes patient instructions, diagnoses, current medications, & vitals. she expressed understanding with the diagnosis and plan. Jessica Arriaga MD    Please note that this dictation was completed with F[employer], the [employer] voice recognition software. Quite often unanticipated grammatical, syntax, homophones, and other interpretive errors are inadvertently transcribed by the computer software.   Please disregard these errors. Please excuse any errors that have escaped final proofreading.

## 2023-01-23 NOTE — DISCHARGE SUMMARY
01/23/23 0450   Treatment   Treatment Type SLED   Treatment Status Blood returned   Dialysis Machine Number [id number]   Dialyzer Time (hours) 10   BVP (Liters) 88 L   Solutions Labeled and Current  Yes   Access Right;IJ;Temporary Cath   Catheter Dressing Intact  Yes   Alarms Engaged Yes   CRRT Comments tx completed   Prescription   Time (Hours) 10   Dialysate K + (mEq/L) 4   Dialysate CA + (mEq/L) 2.25   Dialysate HCO3 - (Bicarb) (mEq/L) 30   Dialysate Na + (mEq/L) 140   Cartridge Type   (r300)   Dialysate Flow Rate (mL/min) 200   UF Goal Rate 500 mL/hr   CRRT Hourly Documentation   Total UF (Hourly Cleared) (mL) 292        Hospitalist Discharge Summary     Patient ID:  Beatris Brunner  [id number]  68 y.o.  1950 12/13/2020    PCP on record: Jaylan Hartmann MD    Admit date: 12/13/2020  Discharge date and time: 12/24/2020    DISCHARGE DIAGNOSIS:    Lower Back Pain POA  S/P Kyphoplasty  Hypoxia POA (Resolved)  Chronic systolic CHF POA  Hypercholesterolemia POA  Chronic Afib POA  Type 2 Diabetes POA  Obesity (BMO 30-39. 9)  Stage 3 CKD POA    CONSULTATIONS:  IP CONSULT TO HOSPITALIST  IP CONSULT TO CARDIOLOGY  IP CONSULT TO NEUROSURGERY    Excerpted HPI from H&P of Abby Siddiqui MD:    Ro Moreno is a 78 y.o.  female with past medical history significant for CHF, SHAYLA on CPAP, and spinal stenosis who presents with worsening of lower back pain over the last 3 weeks. Patient has chronic lower back pain that is on and off for years. She follows up with an orthopedic doctor and receives steroid injections almost every 2 months. Her last dose was in October. Patient gets some relief with the steroid injection but the pain often recurs. Over the last 3 weeks, her lower back pain has gotten worse to the extent she was unable to walk or get out of bed. The pain has been persistent, 10/10, and occasionally radiates to the right leg. Worse with any kind of movement, no relieving factor. She has tried different analgesics and muscle relaxants with no significant improvement. She denies subjective fever, bowel/bladder incontinence, or lower extremity weakness. Patient's  called EMS for worsening back pain. Per EMS patient was febrile with a temp of 100.7. No record of fever in the ED. In the ED patient was noted to have a sat of 82% on room air. Patient states she has chronic shortness of breath and gets winded even with walking short distance.   She follows up with her pulmonologist and she was recently prescribed a pulse oximeter to monitor her oxygen but she has not gotten it yet. She denies recent worsening of shortness of breath, cough, chest pain, myalgia, or fatigue. She does have contact history with her son-in-law who has COVID infection.     Of note, patient also has left lower leg erythema and ulcer which started as a blister about a week ago. She was prescribed topical ointment/?antibiotics but hasn't seen any improvement.    ______________________________________________________________________  DISCHARGE SUMMARY/HOSPITAL COURSE:  for full details see H&P, daily progress notes, labs, consult notes. The patient is a pleasant 78year old patient with a past medical history significant for Hypertension, Acute/Chronic LLE cellulitis, chronic venous stasis bilateral LE. Systolic/diastolic heart failure, CAD, Chronic Afib with BIV ICD, CKD stage III, Type 2 diabetes with diabetic neuropathy, GERD, and anxiety and depression. She  Has had chronic low back pain. Whole body scan performed on admission showing acute L1 compression fracture. She was seen by IR for kyphoplasty and treated for LLE cellulitis. She had improved back pain and was discharged home with antibiotics and home health for PT, and wound care. Discharge discussed with patient and family. Verbalizes understanding. All questions answered at this time. Whole body scan done  IMPRESSION:   1. Acute L1 compression fracture. 2. No pattern of skeletal metastases      CXR 12/13/2020: Without acute infiltrate or congestion    CTA Chest - \"Small bilateral pleural effusions with overlying atelectasis. No pulmonary embolus or other acute cardiopulmonary process. \"       ECHO 8/2020 - EF 35-40%. Dilated left ventricle. Mild concentric hypertrophy. Severe systolic function. Left ventricular diastolic function.   Appreciate  Cardiology input - no plans for any invasive cardiac work up ,not grossly overloaded  _______________________________________________________________________  Patient seen and examined by me on discharge day. Pertinent Findings:  Gen:    Not in distress, cooperative  Chest: Clear lungs, no wheeze, no rales noted  CVS:   Regular rhythm. No edema  Abd:  Soft, not distended, not tender  Neuro:  Alert, not anxious  _______________________________________________________________________  DISCHARGE MEDICATIONS:   Current Discharge Medication List      START taking these medications    Details   calcium carbonate (OS-YEFRI) 500 mg calcium (1,250 mg) tablet Take 1 Tab by mouth daily. Qty: 30 Tab, Refills: 0      levoFLOXacin (LEVAQUIN) 750 mg tablet Take 1 Tab by mouth every fourty-eight (48) hours for 10 days. Qty: 5 Tab, Refills: 0      doxycycline (VIBRA-TABS) 100 mg tablet Take 1 Tab by mouth every twelve (12) hours for 10 days. Qty: 20 Tab, Refills: 0         CONTINUE these medications which have CHANGED    Details   bumetanide (BUMEX) 1 mg tablet Take 1 Tab by mouth daily. Qty: 30 Tab, Refills: 0      hydrALAZINE (APRESOLINE) 50 mg tablet Take 1 Tab by mouth two (2) times a day. Qty: 30 Tab, Refills: 0         CONTINUE these medications which have NOT CHANGED    Details   calcium phosphate-vitamin D3 (Caltrate Gummy Bites) 250 mg-10 mcg (400 unit) chew Take 2 Each by mouth daily. caltrate bone health gummies      oxymetazoline 0.05 % mist 2 Sprays by Nasal route every twelve (12) hours as needed (nose bleed). eucalyptus-peppermint oil (Ponaris) soln 1-2 Drops by Nasal route nightly as needed. pregabalin (Lyrica) 300 mg capsule Take 300 mg by mouth two (2) times a day. clonazePAM (KlonoPIN) 0.5 mg tablet TAKE 1 TABLET BY MOUTH NIGHTLY .  DO NOT EXCEED  0.5MG  PER  DAY  Qty: 90 Tab, Refills: 0    Associated Diagnoses: Anxiety      triamcinolone acetonide (KENALOG) 0.1 % topical cream APPLY A THIN FILM OF CREAM EXTERNALLY TO AFFECTED AREA TWICE DAILY  Qty: 15 g, Refills: 3      clotrimazole-betamethasone (LOTRISONE) topical cream APPLY TO THE AFFECTED AREA 2 TIMES DAILY  Qty: 15 g, Refills: 3 isosorbide mononitrate ER (IMDUR) 60 mg CR tablet Take 1 Tab by mouth daily. Qty: 90 Tab, Refills: 3      cyclobenzaprine (FLEXERIL) 10 mg tablet Take 1 tablet by mouth three times daily as needed for muscle spasm  Qty: 90 Tab, Refills: 0      glimepiride (AMARYL) 2 mg tablet Take 1 tablet by mouth twice daily  Qty: 180 Tab, Refills: 3      magnesium oxide (MAG-OX) 400 mg tablet Take 1 tablet by mouth twice daily  Qty: 180 Tab, Refills: 3      loratadine (Claritin) 10 mg tablet Take 10 mg by mouth two (2) times a day. Indications: Patient states she is using Claritin instead of Benadryl, is not taking Benadryl per patient. lisinopriL (PRINIVIL, ZESTRIL) 40 mg tablet Take 0.5 Tabs by mouth daily. Qty: 30 Tab, Refills: 1      mupirocin calcium (BACTROBAN) 2 % topical cream Apply  to affected area two (2) times a day. Qty: 15 g, Refills: 0      omeprazole (PRILOSEC) 40 mg capsule TAKE 1 CAPSULE EVERY DAY  Qty: 90 Cap, Refills: 3      potassium chloride SR (KLOR-CON 10) 10 mEq tablet Take 1 Tab by mouth three (3) times daily. Qty: 540 Tab, Refills: 3      SYMBICORT 160-4.5 mcg/actuation HFAA INHALE 2 PUFFS BY MOUTH TWICE DAILY  Qty: 1 Inhaler, Refills: 3    Comments: Please consider 90 day supplies to promote better adherence  Associated Diagnoses: Cough      albuterol (PROVENTIL HFA, VENTOLIN HFA, PROAIR HFA) 90 mcg/actuation inhaler Take 1 Puff by inhalation every four (4) hours as needed for Wheezing. Qty: 1 Inhaler, Refills: 6      diphenhydrAMINE (BENADRYL) 25 mg capsule Take 25 mg by mouth two (2) times a day. nystatin (MYCOSTATIN) topical cream Apply  to affected area two (2) times daily as needed for Skin Irritation (Rash). traMADol (ULTRAM) 50 mg tablet Take 50 mg by mouth daily as needed for Pain (Used to supplement the Lyrica when lyrica doesnt manage the pain on its own). dabigatran etexilate (PRADAXA) 150 mg capsule Take 1 Cap by mouth two (2) times a day.  IF NO BLEEDING AT CATH SITE. Qty: 60 Cap, Refills: 12      metoprolol succinate (TOPROL-XL) 100 mg tablet Take 1 Tab by mouth daily. Qty: 30 Tab, Refills: prn      acetaminophen (TYLENOL EXTRA STRENGTH) 500 mg tablet Take 1,000 mg by mouth every eight (8) hours as needed for Pain (Headache). lidocaine (ASPERCREME, LIDOCAINE,) 4 % topical cream Apply  to affected area daily as needed for Pain (Knee pain). sodium chloride (AYR SALINE) 0.65 % nasal squeeze bottle 2 Sprays by Both Nostrils route every eight (8) hours as needed. conjugated estrogens (PREMARIN) 0.625 mg/gram vaginal cream Insert 0.5 g into vagina two (2) times a week. Tuesdays and Thursdays       venlafaxine-SR (EFFEXOR XR) 150 mg capsule Take 150 mg by mouth two (2) times daily (with meals). cholecalciferol, vitamin d3, (VITAMIN D3) 400 unit cap Take 400 Units by mouth daily. aspirin 81 mg chewable tablet Take 81 mg by mouth daily. Associated Diagnoses: Unspecified hereditary and idiopathic peripheral neuropathy; Essential and other specified forms of tremor; Migraine with aura, without mention of intractable migraine without mention of status migrainosus; Type II or unspecified type diabetes mellitus with neurological manifestations, not stated as uncontrolled(250.60) ([address] B12 deficiency; Ataxia; Spinal stenosis, lumbar region, without neurogenic claudication; HBP (high blood pressure); Polyneuropathy in diabetes(357.2); Type II or unspecified type diabetes mellitus with neurological manifestations, not stated as uncontrolled(250.60) ([address] Polyneuropathy in diabetes(357.2)      atorvastatin (LIPITOR) 40 mg tablet Take 1 Tab by mouth nightly. Qty: 30 Tab, Refills: 12         STOP taking these medications       amLODIPine (NORVASC) 2.5 mg tablet Comments:   Reason for Stopping:         calcium carb/vit D2/minerals (CALTRATE PLUS PO) Comments:   Reason for Stopping:                 Patient Follow Up Instructions:    Activity: Activity as tolerated  Diet: Diabetic Diet  Wound Care: Left lower extremity venous stasis wounds: daily dressing changes cleanse with Normal Saline. Cover with a piece of Xeroform gauze, ABD pad and secure with gauze jared wrap    Follow-up with PCP in 1 week. Follow-up tests/labs PCP recommendation    Follow-up Information     Follow up With Specialties Details Why Contact Blaze Benedict MD Internal Medicine Go on 1/4/2021 9:30AM in office visit  [address]      [address] Call in 1 day this is your home health provider. Please call if you have not heard from them within 24 hours of discharge.   [address] [facility], [address] [place] [address]    Francheska Carrero MD Cardiology In 6 months  [facility]  [address] [place] [address],[address]  [phone]          ________________________________________________________________    Risk of deterioration: Low    Condition at Discharge:  Stable  __________________________________________________________________    Disposition  Home with family and home health services    ____________________________________________________________________    Code Status: Full Code  ___________________________________________________________________      Total time in minutes spent coordinating this discharge (includes going over instructions, follow-up, prescriptions, and preparing report for sign off to her PCP) :  >30 minutes    Signed:  Kelsea Weeks NP

## 2023-01-25 ENCOUNTER — OFFICE VISIT (OUTPATIENT)
Dept: INTERNAL MEDICINE CLINIC | Age: 73
End: 2023-01-25
Payer: MEDICARE

## 2023-01-25 VITALS
HEIGHT: 67 IN | RESPIRATION RATE: 18 BRPM | DIASTOLIC BLOOD PRESSURE: 60 MMHG | SYSTOLIC BLOOD PRESSURE: 102 MMHG | BODY MASS INDEX: 36.7 KG/M2 | OXYGEN SATURATION: 97 % | WEIGHT: 233.8 LBS | HEART RATE: 80 BPM | TEMPERATURE: 98.5 F

## 2023-01-25 DIAGNOSIS — N18.32 STAGE 3B CHRONIC KIDNEY DISEASE (HCC): ICD-10-CM

## 2023-01-25 DIAGNOSIS — I48.20 CHRONIC ATRIAL FIBRILLATION (HCC): ICD-10-CM

## 2023-01-25 DIAGNOSIS — Z00.00 ROUTINE ADULT HEALTH MAINTENANCE: Primary | ICD-10-CM

## 2023-01-25 DIAGNOSIS — I25.118 CORONARY ARTERY DISEASE OF NATIVE HEART WITH STABLE ANGINA PECTORIS, UNSPECIFIED VESSEL OR LESION TYPE (HCC): ICD-10-CM

## 2023-01-25 DIAGNOSIS — E11.21 TYPE 2 DIABETES WITH NEPHROPATHY (HCC): ICD-10-CM

## 2023-01-25 DIAGNOSIS — E78.00 HYPERCHOLESTEROLEMIA: ICD-10-CM

## 2023-01-25 DIAGNOSIS — I50.22 SYSTOLIC CHF, CHRONIC (HCC): ICD-10-CM

## 2023-01-25 DIAGNOSIS — E66.01 SEVERE OBESITY (BMI 35.0-39.9) WITH COMORBIDITY (HCC): ICD-10-CM

## 2023-01-25 PROCEDURE — G8417 CALC BMI ABV UP PARAM F/U: HCPCS | Performed by: INTERNAL MEDICINE

## 2023-01-25 PROCEDURE — 2022F DILAT RTA XM EVC RTNOPTHY: CPT | Performed by: INTERNAL MEDICINE

## 2023-01-25 PROCEDURE — G9717 DOC PT DX DEP/BP F/U NT REQ: HCPCS | Performed by: INTERNAL MEDICINE

## 2023-01-25 PROCEDURE — 99214 OFFICE O/P EST MOD 30 MIN: CPT | Performed by: INTERNAL MEDICINE

## 2023-01-25 PROCEDURE — 3074F SYST BP LT 130 MM HG: CPT | Performed by: INTERNAL MEDICINE

## 2023-01-25 PROCEDURE — G8399 PT W/DXA RESULTS DOCUMENT: HCPCS | Performed by: INTERNAL MEDICINE

## 2023-01-25 PROCEDURE — 1123F ACP DISCUSS/DSCN MKR DOCD: CPT | Performed by: INTERNAL MEDICINE

## 2023-01-25 PROCEDURE — 3017F COLORECTAL CA SCREEN DOC REV: CPT | Performed by: INTERNAL MEDICINE

## 2023-01-25 PROCEDURE — 1101F PT FALLS ASSESS-DOCD LE1/YR: CPT | Performed by: INTERNAL MEDICINE

## 2023-01-25 PROCEDURE — G9899 SCRN MAM PERF RSLTS DOC: HCPCS | Performed by: INTERNAL MEDICINE

## 2023-01-25 PROCEDURE — 3046F HEMOGLOBIN A1C LEVEL >9.0%: CPT | Performed by: INTERNAL MEDICINE

## 2023-01-25 PROCEDURE — G8427 DOCREV CUR MEDS BY ELIG CLIN: HCPCS | Performed by: INTERNAL MEDICINE

## 2023-01-25 PROCEDURE — 3078F DIAST BP <80 MM HG: CPT | Performed by: INTERNAL MEDICINE

## 2023-01-25 PROCEDURE — G8536 NO DOC ELDER MAL SCRN: HCPCS | Performed by: INTERNAL MEDICINE

## 2023-01-25 PROCEDURE — 1090F PRES/ABSN URINE INCON ASSESS: CPT | Performed by: INTERNAL MEDICINE

## 2023-01-25 NOTE — PROGRESS NOTES
Chief Complaint   Patient presents with    Follow-up       1. \"Have you been to the ER, urgent care clinic since your last visit?  Hospitalized since your last visit?\" No    2. \"Have you seen or consulted any other health care providers outside of the [facility] since your last visit?\" No     3. For patients aged 45-75: Has the patient had a colonoscopy / FIT/ Cologuard? No      If the patient is female:    4. For patients aged 40-74: Has the patient had a mammogram within the past 2 years? No      5. For patients aged 21-65: Has the patient had a pap smear? No

## 2023-01-25 NOTE — PROGRESS NOTES
Chief Complaint   Patient presents with    Follow-up       1. \"Have you been to the ER, urgent care clinic since your last visit? Hospitalized since your last visit? \" No    2. \"Have you seen or consulted any other health care providers outside of the [address] [place] [address] since your last visit? \" No     3. For patients aged 39-70: Has the patient had a colonoscopy / FIT/ Cologuard? No      If the patient is female:    4. For patients aged 41-77: Has the patient had a mammogram within the past 2 years? No      5. For patients aged 21-65: Has the patient had a pap smear?  No

## 2023-02-14 NOTE — PROGRESS NOTES
End of Shift Note    Bedside shift change report given to Apoorva Baptiste (oncoming nurse) by Bahman Elliott (offgoing nurse). Report included the following information SBAR, Kardex and MAR    Shift worked:  7a-7p     Shift summary and any significant changes:     none     Concerns for physician to address:  none     Zone phone for oncoming shift:          Activity:  Activity Level: Up with Assistance  Number times ambulated in hallways past shift: 3  Number of times OOB to chair past shift: 3    Cardiac:   Cardiac Monitoring: No      Cardiac Rhythm: Paced    Access:   Current line(s): PIV     Genitourinary:   Urinary status: voiding    Respiratory:   O2 Device: Nasal cannula  Chronic home O2 use?: NO  Incentive spirometer at bedside: NO     GI:  Last Bowel Movement Date: 12/11/20  Current diet:  DIET DIABETIC CONSISTENT CARB Regular; FR 1000ML  Passing flatus: YES  Tolerating current diet: YES       Pain Management:   Patient states pain is manageable on current regimen: YES    Skin:  Preet Score: 16  Interventions: float heels    Patient Safety:  Fall Score:  Total Score: 2  Interventions: gripper socks and pt to call before getting OOB       Length of Stay:  Expected LOS: - - -  Actual LOS: [facility] The patient is Stable - Low risk of patient condition declining or worsening    Shift Goals  Clinical Goals: Safety/mobility  Patient Goals: Sleep; Food  Family Goals: RADHA    Progress made toward(s) clinical / shift goals:    Problem: Risk for Aspiration  Goal: Patient's risk for aspiration will be absent or decrease  Outcome: Progressing   Pt up 90 degrees for breakfast and lunch.     Problem: Pain - Standard  Goal: Alleviation of pain or a reduction in pain to the patient’s comfort goal  Outcome: Progressing   0-10 pain scale in place.     Patient is not progressing towards the following goals:N/A

## 2023-03-01 ENCOUNTER — TRANSCRIBE ORDER (OUTPATIENT)
Dept: SCHEDULING | Age: 73
End: 2023-03-01

## 2023-03-01 DIAGNOSIS — Z12.31 VISIT FOR SCREENING MAMMOGRAM: Primary | ICD-10-CM

## 2023-03-22 RX ORDER — TRIAMCINOLONE ACETONIDE 1 MG/G
CREAM TOPICAL
Qty: 15 G | Refills: 3 | Status: SHIPPED | OUTPATIENT
Start: 2023-03-22

## 2023-04-05 RX ORDER — CLONAZEPAM 0.5 MG/1
TABLET ORAL
Qty: 90 TABLET | Refills: 0 | Status: SHIPPED
Start: 2023-04-05

## 2023-04-22 DIAGNOSIS — E11.21 TYPE 2 DIABETES WITH NEPHROPATHY (HCC): Primary | ICD-10-CM

## 2023-04-22 DIAGNOSIS — Z12.31 VISIT FOR SCREENING MAMMOGRAM: Primary | ICD-10-CM

## 2023-04-23 DIAGNOSIS — N18.32 STAGE 3B CHRONIC KIDNEY DISEASE (HCC): Primary | ICD-10-CM

## 2023-04-24 DIAGNOSIS — I25.118 CORONARY ARTERY DISEASE OF NATIVE HEART WITH STABLE ANGINA PECTORIS, UNSPECIFIED VESSEL OR LESION TYPE (HCC): Primary | ICD-10-CM

## 2023-04-24 DIAGNOSIS — N18.32 STAGE 3B CHRONIC KIDNEY DISEASE (HCC): Primary | ICD-10-CM

## 2023-04-27 ENCOUNTER — HOSPITAL ENCOUNTER (OUTPATIENT)
Dept: MAMMOGRAPHY | Age: 73
Discharge: HOME | End: 2023-04-27
Attending: INTERNAL MEDICINE
Payer: MEDICARE

## 2023-04-27 DIAGNOSIS — Z12.31 VISIT FOR SCREENING MAMMOGRAM: ICD-10-CM

## 2023-04-27 PROCEDURE — 77063 BREAST TOMOSYNTHESIS BI: CPT

## 2023-05-16 RX ORDER — POTASSIUM CHLORIDE 750 MG/1
TABLET, EXTENDED RELEASE ORAL
Qty: 360 TABLET | Refills: 3 | Status: SHIPPED | OUTPATIENT
Start: 2023-05-16

## 2023-05-16 NOTE — TELEPHONE ENCOUNTER
Requested Prescriptions     Pending Prescriptions Disp Refills    potassium chloride (KLOR-CON M) 10 MEQ extended release tablet [Pharmacy Med Name: POTASSIUM CHLORIDE ER 10 MEQ Tablet Extended Release] 360 tablet 3     Sig: TAKE 1 TABLET FOUR TIMES DAILY       RX refill request from the patient/pharmacy. Patient last seen 1/25/23 with labs, and next appt. scheduled for 7/28/23.

## 2023-06-05 NOTE — PROGRESS NOTES
Bimal Quiroz is a 74 y.o. female presenting for Cough; Wheezing; and Cold Symptoms  . 1. Have you been to the ER, urgent care clinic since your last visit? Hospitalized since your last visit? No    2. Have you seen or consulted any other health care providers outside of the [address] [place] [address] since your last visit? Include any pap smears or colon screening. No    Fall Risk Assessment, last 12 mths 8/1/2018   Able to walk? Yes   Fall in past 12 months? No         No flowsheet data found. No flowsheet data found. There are no discontinued medications. pt was running around with sister and pt states her sister twisted her left arm around 8pm. denies falling. pulses, cap refill WNL. last Motrin @8:30pm.

## 2023-06-21 ENCOUNTER — TELEPHONE (OUTPATIENT)
Facility: CLINIC | Age: 73
End: 2023-06-21

## 2023-06-23 DIAGNOSIS — I25.118 CORONARY ARTERY DISEASE OF NATIVE HEART WITH STABLE ANGINA PECTORIS, UNSPECIFIED VESSEL OR LESION TYPE (HCC): Primary | ICD-10-CM

## 2023-06-23 RX ORDER — CARVEDILOL 25 MG/1
25 TABLET ORAL 2 TIMES DAILY WITH MEALS
Qty: 180 TABLET | Refills: 0 | Status: SHIPPED | OUTPATIENT
Start: 2023-06-23

## 2023-06-23 NOTE — TELEPHONE ENCOUNTER
Requested Prescriptions     Pending Prescriptions Disp Refills    carvedilol (COREG) 25 MG tablet 180 tablet 0     Sig: Take 1 tablet by mouth 2 times daily (with meals)       RX refill request from the patient/pharmacy. Patient last seen 1/25/23 with labs, and next appt. scheduled for 7/28/23.

## 2023-07-05 DIAGNOSIS — F41.9 ANXIETY: Primary | ICD-10-CM

## 2023-07-06 DIAGNOSIS — F41.9 ANXIETY: ICD-10-CM

## 2023-07-06 RX ORDER — CLONAZEPAM 0.5 MG/1
0.5 TABLET ORAL NIGHTLY
Qty: 90 TABLET | Refills: 0 | Status: SHIPPED | OUTPATIENT
Start: 2023-07-06 | End: 2023-10-04

## 2023-07-06 RX ORDER — CLONAZEPAM 0.5 MG/1
0.5 TABLET ORAL NIGHTLY
Qty: 30 TABLET | Refills: 0 | OUTPATIENT
Start: 2023-07-06 | End: 2023-10-04

## 2023-07-06 NOTE — TELEPHONE ENCOUNTER
PCP: Carlie Mathur MD    Last appt: 1/25/2023  Future Appointments   Date Time Provider [address]   7/11/2023  8:00 AM LAB ONLY [facility] AMB   7/28/2023  2:30 PM LIZZY Tapia - NP [facility] AMB       Requested Prescriptions     Pending Prescriptions Disp Refills    clonazePAM (KLONOPIN) 0.5 MG tablet 30 tablet 0     Sig: Take 1 tablet by mouth nightly for 90 days.  **Must establish with new PCP for further refills** Max Daily Amount: 0.5 mg       Prior labs and Blood pressures:  BP Readings from Last 3 Encounters:   01/25/23 102/60   07/22/22 100/60   01/12/22 126/70     Lab Results   Component Value Date/Time     07/22/2022 08:42 AM    K 5.0 07/22/2022 08:42 AM     07/22/2022 08:42 AM    CO2 31 07/22/2022 08:42 AM    BUN 19 07/22/2022 08:42 AM    GFRAA 35 07/22/2022 08:42 AM     No results found for: HBA1C, [id number]  Lab Results   Component Value Date/Time    CHOL 114 07/22/2022 08:42 AM    HDL 41 07/22/2022 08:42 AM    VLDL 39 03/08/2021 10:56 AM     No results found for: VITD3, VD3RIA        Lab Results   Component Value Date/Time    TSH 3.28 01/12/2022 08:50 AM

## 2023-07-11 ENCOUNTER — NURSE ONLY (OUTPATIENT)
Facility: CLINIC | Age: 73
End: 2023-07-11

## 2023-07-11 DIAGNOSIS — E11.21 TYPE 2 DIABETES WITH NEPHROPATHY (HCC): ICD-10-CM

## 2023-07-11 DIAGNOSIS — N18.32 STAGE 3B CHRONIC KIDNEY DISEASE (HCC): ICD-10-CM

## 2023-07-11 DIAGNOSIS — I25.118 CORONARY ARTERY DISEASE OF NATIVE HEART WITH STABLE ANGINA PECTORIS, UNSPECIFIED VESSEL OR LESION TYPE (HCC): ICD-10-CM

## 2023-07-12 DIAGNOSIS — K58.9 IRRITABLE BOWEL SYNDROME, UNSPECIFIED TYPE: Primary | ICD-10-CM

## 2023-07-12 LAB
ALBUMIN SERPL-MCNC: 3.3 G/DL (ref 3.5–5)
ALBUMIN/GLOB SERPL: 1.1 (ref 1.1–2.2)
ALP SERPL-CCNC: 119 U/L (ref 45–117)
ALT SERPL-CCNC: 19 U/L (ref 12–78)
ANION GAP SERPL CALC-SCNC: 4 MMOL/L (ref 5–15)
AST SERPL-CCNC: 13 U/L (ref 15–37)
BASOPHILS # BLD: 0.1 K/UL (ref 0–0.1)
BASOPHILS NFR BLD: 1 % (ref 0–1)
BILIRUB SERPL-MCNC: 0.7 MG/DL (ref 0.2–1)
BUN SERPL-MCNC: 23 MG/DL (ref 6–20)
BUN/CREAT SERPL: 15 (ref 12–20)
CALCIUM SERPL-MCNC: 9.5 MG/DL (ref 8.5–10.1)
CHLORIDE SERPL-SCNC: 107 MMOL/L (ref 97–108)
CHOLEST SERPL-MCNC: 110 MG/DL
CO2 SERPL-SCNC: 31 MMOL/L (ref 21–32)
CREAT SERPL-MCNC: 1.55 MG/DL (ref 0.55–1.02)
DIFFERENTIAL METHOD BLD: ABNORMAL
EOSINOPHIL # BLD: 0.1 K/UL (ref 0–0.4)
EOSINOPHIL NFR BLD: 1 % (ref 0–7)
ERYTHROCYTE [DISTWIDTH] IN BLOOD BY AUTOMATED COUNT: 17.3 % (ref 11.5–14.5)
EST. AVERAGE GLUCOSE BLD GHB EST-MCNC: 100 MG/DL
GLOBULIN SER CALC-MCNC: 2.9 G/DL (ref 2–4)
GLUCOSE SERPL-MCNC: 140 MG/DL (ref 65–100)
HBA1C MFR BLD: 5.1 % (ref 4–5.6)
HCT VFR BLD AUTO: 40.7 % (ref 35–47)
HDLC SERPL-MCNC: 38 MG/DL
HDLC SERPL: 2.9 (ref 0–5)
HGB BLD-MCNC: 12.2 G/DL (ref 11.5–16)
IMM GRANULOCYTES # BLD AUTO: 0 K/UL (ref 0–0.04)
IMM GRANULOCYTES NFR BLD AUTO: 0 % (ref 0–0.5)
LDLC SERPL CALC-MCNC: 55.4 MG/DL (ref 0–100)
LYMPHOCYTES # BLD: 0.7 K/UL (ref 0.8–3.5)
LYMPHOCYTES NFR BLD: 13 % (ref 12–49)
MCH RBC QN AUTO: 27.3 PG (ref 26–34)
MCHC RBC AUTO-ENTMCNC: 30 G/DL (ref 30–36.5)
MCV RBC AUTO: 91.1 FL (ref 80–99)
MONOCYTES # BLD: 0.3 K/UL (ref 0–1)
MONOCYTES NFR BLD: 6 % (ref 5–13)
NEUTS SEG # BLD: 4.1 K/UL (ref 1.8–8)
NEUTS SEG NFR BLD: 79 % (ref 32–75)
NRBC # BLD: 0 K/UL (ref 0–0.01)
NRBC BLD-RTO: 0 PER 100 WBC
PLATELET # BLD AUTO: 174 K/UL (ref 150–400)
PMV BLD AUTO: 13 FL (ref 8.9–12.9)
POTASSIUM SERPL-SCNC: 4 MMOL/L (ref 3.5–5.1)
PROT SERPL-MCNC: 6.2 G/DL (ref 6.4–8.2)
RBC # BLD AUTO: 4.47 M/UL (ref 3.8–5.2)
RBC MORPH BLD: ABNORMAL
SODIUM SERPL-SCNC: 142 MMOL/L (ref 136–145)
TRIGL SERPL-MCNC: 83 MG/DL
VLDLC SERPL CALC-MCNC: 16.6 MG/DL
WBC # BLD AUTO: 5.3 K/UL (ref 3.6–11)

## 2023-07-12 RX ORDER — OMEPRAZOLE 40 MG/1
40 CAPSULE, DELAYED RELEASE ORAL DAILY
Qty: 90 CAPSULE | Refills: 1 | Status: SHIPPED | OUTPATIENT
Start: 2023-07-12

## 2023-07-12 NOTE — TELEPHONE ENCOUNTER
Requested Prescriptions     Pending Prescriptions Disp Refills    omeprazole (PRILOSEC) 40 MG delayed release capsule 90 capsule 1     Sig: Take 1 capsule by mouth daily       RX refill request from the patient/pharmacy. Patient last seen 1/25/23 with labs, and next appt. scheduled for 7/28/23.

## 2023-07-13 LAB
CREAT UR-MCNC: 102 MG/DL
MICROALBUMIN UR-MCNC: 1.85 MG/DL
MICROALBUMIN/CREAT UR-RTO: 18 MG/G (ref 0–30)

## 2023-07-19 RX ORDER — TRIAMCINOLONE ACETONIDE 1 MG/G
CREAM TOPICAL
Qty: 15 G | Refills: 0 | Status: CANCELLED | OUTPATIENT
Start: 2023-07-19

## 2023-07-19 NOTE — TELEPHONE ENCOUNTER
triamcinolone acetonide (KENALOG) 0.1 % topical cream 15 g 0 3/22/2023     Sig: APPLY A THIN FILM OF CREAM EXTERNALLY TO THE AFFECTED AREA TWICE DAILY      Last visit: 1/25/23  Future visit: 7/28/23    Pharm[facility]: [facility]

## 2023-07-20 RX ORDER — TRIAMCINOLONE ACETONIDE 1 MG/G
CREAM TOPICAL
Qty: 28 G | Refills: 1 | Status: SHIPPED | OUTPATIENT
Start: 2023-07-20 | End: 2023-09-08

## 2023-07-20 NOTE — TELEPHONE ENCOUNTER
PCP: Delores Mccloud MD    Last appt: 1/25/2023  Future Appointments   Date Time Provider [address]   7/28/2023  2:30 PM Yamilex Rosas, APRN - NP [facility] AMB       Requested Prescriptions     Pending Prescriptions Disp Refills    triamcinolone (KENALOG) 0.1 % cream  1     Sig: APPLY A THIN FILM OF CREAM EXTERNALLY TO THE AFFECTED AREA TWICE DAILY       Prior labs and Blood pressures:  BP Readings from Last 3 Encounters:   01/25/23 102/60   07/22/22 100/60   01/12/22 126/70     Lab Results   Component Value Date/Time     07/11/2023 08:23 AM    K 4.0 07/11/2023 08:23 AM     07/11/2023 08:23 AM    CO2 31 07/11/2023 08:23 AM    BUN 23 07/11/2023 08:23 AM    GFRAA 35 07/22/2022 08:42 AM     No results found for: HBA1C, [id number]  Lab Results   Component Value Date/Time    CHOL 110 07/11/2023 08:23 AM    HDL 38 07/11/2023 08:23 AM    VLDL 39 03/08/2021 10:56 AM     No results found for: VITD3, VD3RIA        Lab Results   Component Value Date/Time    TSH 3.28 01/12/2022 08:50 AM

## 2023-07-25 SDOH — HEALTH STABILITY: PHYSICAL HEALTH: ON AVERAGE, HOW MANY MINUTES DO YOU ENGAGE IN EXERCISE AT THIS LEVEL?: 30 MIN

## 2023-07-25 SDOH — HEALTH STABILITY: PHYSICAL HEALTH: ON AVERAGE, HOW MANY DAYS PER WEEK DO YOU ENGAGE IN MODERATE TO STRENUOUS EXERCISE (LIKE A BRISK WALK)?: 1 DAY

## 2023-07-25 ASSESSMENT — SOCIAL DETERMINANTS OF HEALTH (SDOH)
WITHIN THE LAST YEAR, HAVE YOU BEEN KICKED, HIT, SLAPPED, OR OTHERWISE PHYSICALLY HURT BY YOUR PARTNER OR EX-PARTNER?: NO
WITHIN THE LAST YEAR, HAVE TO BEEN RAPED OR FORCED TO HAVE ANY KIND OF SEXUAL ACTIVITY BY YOUR PARTNER OR EX-PARTNER?: NO
WITHIN THE LAST YEAR, HAVE YOU BEEN HUMILIATED OR EMOTIONALLY ABUSED IN OTHER WAYS BY YOUR PARTNER OR EX-PARTNER?: NO
WITHIN THE LAST YEAR, HAVE YOU BEEN AFRAID OF YOUR PARTNER OR EX-PARTNER?: NO

## 2023-07-28 ENCOUNTER — OFFICE VISIT (OUTPATIENT)
Facility: CLINIC | Age: 73
End: 2023-07-28
Payer: MEDICARE

## 2023-07-28 VITALS
WEIGHT: 244.6 LBS | BODY MASS INDEX: 38.39 KG/M2 | DIASTOLIC BLOOD PRESSURE: 80 MMHG | HEART RATE: 80 BPM | TEMPERATURE: 98.6 F | RESPIRATION RATE: 18 BRPM | HEIGHT: 67 IN | SYSTOLIC BLOOD PRESSURE: 120 MMHG | OXYGEN SATURATION: 91 %

## 2023-07-28 DIAGNOSIS — Z95.818 PRESENCE OF WATCHMAN LEFT ATRIAL APPENDAGE CLOSURE DEVICE: ICD-10-CM

## 2023-07-28 DIAGNOSIS — I48.20 CHRONIC ATRIAL FIBRILLATION (HCC): ICD-10-CM

## 2023-07-28 DIAGNOSIS — L30.4 INTERTRIGINOUS DERMATITIS ASSOCIATED WITH MOISTURE: Primary | ICD-10-CM

## 2023-07-28 DIAGNOSIS — Z00.00 MEDICARE ANNUAL WELLNESS VISIT, SUBSEQUENT: ICD-10-CM

## 2023-07-28 DIAGNOSIS — Z95.0 S/P PLACEMENT OF CARDIAC PACEMAKER: ICD-10-CM

## 2023-07-28 DIAGNOSIS — I50.22 CHRONIC SYSTOLIC HEART FAILURE (HCC): ICD-10-CM

## 2023-07-28 DIAGNOSIS — E11.21 TYPE 2 DIABETES WITH NEPHROPATHY (HCC): ICD-10-CM

## 2023-07-28 PROBLEM — J45.901 ACUTE ASTHMA EXACERBATION: Status: RESOLVED | Noted: 2019-08-17 | Resolved: 2023-07-28

## 2023-07-28 PROBLEM — R10.13 EPIGASTRIC ABDOMINAL PAIN: Status: RESOLVED | Noted: 2018-11-13 | Resolved: 2023-07-28

## 2023-07-28 PROBLEM — J96.90 RESPIRATORY FAILURE (HCC): Status: RESOLVED | Noted: 2020-08-09 | Resolved: 2023-07-28

## 2023-07-28 PROCEDURE — G0439 PPPS, SUBSEQ VISIT: HCPCS | Performed by: NURSE PRACTITIONER

## 2023-07-28 PROCEDURE — 3078F DIAST BP <80 MM HG: CPT | Performed by: NURSE PRACTITIONER

## 2023-07-28 PROCEDURE — 1123F ACP DISCUSS/DSCN MKR DOCD: CPT | Performed by: NURSE PRACTITIONER

## 2023-07-28 PROCEDURE — 3074F SYST BP LT 130 MM HG: CPT | Performed by: NURSE PRACTITIONER

## 2023-07-28 PROCEDURE — 3044F HG A1C LEVEL LT 7.0%: CPT | Performed by: NURSE PRACTITIONER

## 2023-07-28 RX ORDER — HYDRALAZINE HYDROCHLORIDE 50 MG/1
TABLET, FILM COATED ORAL
COMMUNITY
Start: 2023-06-05

## 2023-07-28 RX ORDER — BUMETANIDE 2 MG/1
2 TABLET ORAL DAILY
Qty: 90 TABLET | Refills: 1 | Status: SHIPPED | OUTPATIENT
Start: 2023-07-28

## 2023-07-28 RX ORDER — NYSTATIN 100000 U/G
CREAM TOPICAL
Qty: 30 G | Refills: 4 | Status: SHIPPED | OUTPATIENT
Start: 2023-07-28

## 2023-07-28 ASSESSMENT — PATIENT HEALTH QUESTIONNAIRE - PHQ9
4. FEELING TIRED OR HAVING LITTLE ENERGY: 0
5. POOR APPETITE OR OVEREATING: 0
10. IF YOU CHECKED OFF ANY PROBLEMS, HOW DIFFICULT HAVE THESE PROBLEMS MADE IT FOR YOU TO DO YOUR WORK, TAKE CARE OF THINGS AT HOME, OR GET ALONG WITH OTHER PEOPLE: 0
1. LITTLE INTEREST OR PLEASURE IN DOING THINGS: 0
SUM OF ALL RESPONSES TO PHQ QUESTIONS 1-9: 0
9. THOUGHTS THAT YOU WOULD BE BETTER OFF DEAD, OR OF HURTING YOURSELF: 0
3. TROUBLE FALLING OR STAYING ASLEEP: 0
SUM OF ALL RESPONSES TO PHQ QUESTIONS 1-9: 0
6. FEELING BAD ABOUT YOURSELF - OR THAT YOU ARE A FAILURE OR HAVE LET YOURSELF OR YOUR FAMILY DOWN: 0
8. MOVING OR SPEAKING SO SLOWLY THAT OTHER PEOPLE COULD HAVE NOTICED. OR THE OPPOSITE, BEING SO FIGETY OR RESTLESS THAT YOU HAVE BEEN MOVING AROUND A LOT MORE THAN USUAL: 0
2. FEELING DOWN, DEPRESSED OR HOPELESS: 0
7. TROUBLE CONCENTRATING ON THINGS, SUCH AS READING THE NEWSPAPER OR WATCHING TELEVISION: 0
SUM OF ALL RESPONSES TO PHQ QUESTIONS 1-9: 0
SUM OF ALL RESPONSES TO PHQ9 QUESTIONS 1 & 2: 0
SUM OF ALL RESPONSES TO PHQ QUESTIONS 1-9: 0

## 2023-07-28 ASSESSMENT — LIFESTYLE VARIABLES
HOW MANY STANDARD DRINKS CONTAINING ALCOHOL DO YOU HAVE ON A TYPICAL DAY: PATIENT DOES NOT DRINK
HOW OFTEN DO YOU HAVE A DRINK CONTAINING ALCOHOL: NEVER

## 2023-08-03 ASSESSMENT — ENCOUNTER SYMPTOMS
EYE PAIN: 0
ABDOMINAL PAIN: 0
BACK PAIN: 0
APNEA: 0
TROUBLE SWALLOWING: 0
EYE DISCHARGE: 0
SHORTNESS OF BREATH: 0
CONSTIPATION: 0
RECTAL PAIN: 0
COLOR CHANGE: 0
PHOTOPHOBIA: 0
WHEEZING: 0
NAUSEA: 0
VOMITING: 0
SORE THROAT: 0
FACIAL SWELLING: 0
SINUS PAIN: 0
DIARRHEA: 0
BLOOD IN STOOL: 0
CHOKING: 0
ANAL BLEEDING: 0
COUGH: 0
EYE REDNESS: 0
SINUS PRESSURE: 0

## 2023-08-27 PROBLEM — Z00.00 MEDICARE ANNUAL WELLNESS VISIT, SUBSEQUENT: Status: RESOLVED | Noted: 2023-07-28 | Resolved: 2023-08-27

## 2023-08-29 NOTE — PROGRESS NOTES
Problem: Falls - Risk of  Goal: *Absence of Falls  Description  Document Erica Payment Fall Risk and appropriate interventions in the flowsheet.   Outcome: Progressing Towards Goal  Note:   Fall Risk Interventions:  Mobility Interventions: Bed/chair exit alarm         Medication Interventions: Bed/chair exit alarm    Elimination Interventions: Call light in reach Refill request for blas'd up for medications.     Thank you   DUSTIN Camarillo

## 2023-09-05 NOTE — TELEPHONE ENCOUNTER
[facility]     isosorbide mononitrate ER (IMDUR) 60 mg CR tablet 90 Tablet 3 10/3/2022     Sig: TAKE 1 TABLET EVERY DAY

## 2023-09-06 NOTE — TELEPHONE ENCOUNTER
PCP: LIZZY Casarez NP    Last appt: 7/28/2023  Future Appointments   Date Time Provider [address]   10/27/2023  8:00 AM LAB ONLY [facility] AM[facility]   12/28/2023  8:30 AM LIZZY Casarez NP [facility] AM[facility]   6/14/2024  9:00 [facility]       Requested Prescriptions     Pending Prescriptions Disp Refills    isosorbide mononitrate (IMDUR) 60 MG extended release tablet 30 tablet      Sig: Take 1 tablet by mouth daily       Prior labs and Blood pressures:  BP Readings from Last 3 Encounters:   07/28/23 120/80   01/25/23 102/60   07/22/22 100/60     Lab Results   Component Value Date/Time     07/11/2023 08:23 AM    K 4.0 07/11/2023 08:23 AM     07/11/2023 08:23 AM    CO2 31 07/11/2023 08:23 AM    BUN 23 07/11/2023 08:23 AM    GFRAA 35 07/22/2022 08:42 AM     No results found for: HBA1C, [id number]  Lab Results   Component Value Date/Time    CHOL 110 07/11/2023 08:23 AM    HDL 38 07/11/2023 08:23 AM    VLDL 39 03/08/2021 10:56 AM     No results found for: VITD3, VD3RIA        Lab Results   Component Value Date/Time    TSH 3.28 01/12/2022 08:50 AM

## 2023-09-06 NOTE — TELEPHONE ENCOUNTER
PCP: LIZZY Leggett NP    Last appt: 7/28/2023  Future Appointments   Date Time Provider [address]   10/27/2023  8:00 AM LAB ONLY [facility] AMB   12/28/2023  8:30 AM LIZZY Leggett NP [facility] AM[facility]   6/14/2024  9:00 [facility]       Requested Prescriptions     Pending Prescriptions Disp Refills    triamcinolone (KENALOG) 0.1 % cream [Pharmacy Med Name: TRIAMCINOLONE ACETONIDE 0.1 % Cream] 30 g      Sig: APPLY A THIN FILM OF CREAM EXTERNALLY TO THE AFFECTED AREA TWICE DAILY       Prior labs and Blood pressures:  BP Readings from Last 3 Encounters:   07/28/23 120/80   01/25/23 102/60   07/22/22 100/60     Lab Results   Component Value Date/Time     07/11/2023 08:23 AM    K 4.0 07/11/2023 08:23 AM     07/11/2023 08:23 AM    CO2 31 07/11/2023 08:23 AM    BUN 23 07/11/2023 08:23 AM    GFRAA 35 07/22/2022 08:42 AM     No results found for: HBA1C, [id number]  Lab Results   Component Value Date/Time    CHOL 110 07/11/2023 08:23 AM    HDL 38 07/11/2023 08:23 AM    VLDL 39 03/08/2021 10:56 AM     No results found for: VITD3, VD3RIA        Lab Results   Component Value Date/Time    TSH 3.28 01/12/2022 08:50 AM

## 2023-09-07 ENCOUNTER — TELEPHONE (OUTPATIENT)
Facility: CLINIC | Age: 73
End: 2023-09-07

## 2023-09-07 DIAGNOSIS — I25.118 CORONARY ARTERY DISEASE OF NATIVE HEART WITH STABLE ANGINA PECTORIS, UNSPECIFIED VESSEL OR LESION TYPE (HCC): ICD-10-CM

## 2023-09-07 RX ORDER — CARVEDILOL 25 MG/1
25 TABLET ORAL 2 TIMES DAILY WITH MEALS
Qty: 180 TABLET | Refills: 0 | Status: SHIPPED | OUTPATIENT
Start: 2023-09-07

## 2023-09-07 NOTE — TELEPHONE ENCOUNTER
Requested Prescriptions     Pending Prescriptions Disp Refills    carvedilol (COREG) 25 MG tablet 180 tablet 0     Sig: Take 1 tablet by mouth 2 times daily (with meals)       RX refill request from the patient/pharmacy. Patient last seen 7/28/23 without labs, and next appt. scheduled for 12/28/23.

## 2023-09-07 NOTE — TELEPHONE ENCOUNTER
Pharmacy: [address] requesting an increased quantity to accommodate a 3 month supply.      triamcinolone (KENALOG) 0.1 % cream [[id number]]     Order Details  Dose, Route, Frequency: As Directed   Dispense Quantity: 28 g Refills: 1          Sig: APPLY A THIN FILM OF CREAM EXTERNALLY TO THE AFFECTED AREA TWICE DAILY

## 2023-09-08 RX ORDER — ISOSORBIDE MONONITRATE 60 MG/1
60 TABLET, EXTENDED RELEASE ORAL DAILY
Qty: 90 TABLET | Refills: 0 | Status: SHIPPED | OUTPATIENT
Start: 2023-09-08

## 2023-09-08 RX ORDER — TRIAMCINOLONE ACETONIDE 1 MG/G
CREAM TOPICAL
Qty: 30 G | Refills: 0 | Status: SHIPPED | OUTPATIENT
Start: 2023-09-08

## 2023-09-08 NOTE — TELEPHONE ENCOUNTER
This medication was already sent in for a refill request. Still waiting on the medication to be filled.

## 2023-10-04 DIAGNOSIS — F41.9 ANXIETY: ICD-10-CM

## 2023-10-04 RX ORDER — CLONAZEPAM 0.5 MG/1
TABLET ORAL
Qty: 90 TABLET | Refills: 0 | Status: SHIPPED | OUTPATIENT
Start: 2023-10-04 | End: 2023-11-03

## 2023-10-04 NOTE — TELEPHONE ENCOUNTER
PCP: LIZZY Nunn NP    Last appt: Visit date not found    Future Appointments   Date Time Provider [address]   10/27/2023  8:00 AM LAB ONLY JEROD BASHIR   12/28/2023  8:30 AM LIZZY Nunn NP   6/14/2024  9:00 AM Efrem Charlton DO [facility] BS AMB       Requested Prescriptions     Pending Prescriptions Disp Refills    clonazePAM (KLONOPIN) 0.5 MG tablet [Pharmacy Med Name: clonazePAM 0.5 MG Oral Tablet] 90 tablet 0     Sig: TAKE 1 TABLET BY MOUTH NIGHTLY .  DO NOT EXCEED 1 PER 24 HOURS )MUST  ESTABLISH  WITH  NEW  PCP  FOR  FURTHER  REFILLS)

## 2023-10-23 NOTE — TELEPHONE ENCOUNTER
PCP: LIZZY Strong NP    Last appt: Visit date not found    Future Appointments   Date Time Provider [address]   10/27/2023  8:00 AM LAB ONLY PCADANIELA BS AMB   12/28/2023  8:30 AM LIZZY Strong NP PC[facility]EL[facility] BS AMB   6/14/2024  9:00 AM Efrem Charlton DO [facility] BS AMB       Requested Prescriptions     Pending Prescriptions Disp Refills    triamcinolone (KENALOG) 0.1 % cream [Pharmacy Med Name: TRIAMCINOLONE ACETONIDE 0.1 % Cream] 30 g 10     Sig: APPLY A THIN FILM OF CREAM EXTERNALLY TO THE AFFECTED AREA TWICE DAILY

## 2023-10-25 RX ORDER — TRIAMCINOLONE ACETONIDE 1 MG/G
CREAM TOPICAL
Qty: 30 G | Refills: 10 | Status: SHIPPED | OUTPATIENT
Start: 2023-10-25

## 2023-10-27 ENCOUNTER — NURSE ONLY (OUTPATIENT)
Facility: CLINIC | Age: 73
End: 2023-10-27

## 2023-10-27 DIAGNOSIS — E11.40 TYPE 2 DIABETES MELLITUS WITH DIABETIC NEUROPATHY, WITHOUT LONG-TERM CURRENT USE OF INSULIN (HCC): ICD-10-CM

## 2023-10-27 DIAGNOSIS — E11.40 TYPE 2 DIABETES MELLITUS WITH DIABETIC NEUROPATHY, WITHOUT LONG-TERM CURRENT USE OF INSULIN (HCC): Primary | ICD-10-CM

## 2023-10-28 LAB
ALBUMIN SERPL-MCNC: 3.4 G/DL (ref 3.5–5)
ALBUMIN/GLOB SERPL: 1 (ref 1.1–2.2)
ALP SERPL-CCNC: 98 U/L (ref 45–117)
ALT SERPL-CCNC: 17 U/L (ref 12–78)
ANION GAP SERPL CALC-SCNC: 4 MMOL/L (ref 5–15)
APPEARANCE UR: CLEAR
AST SERPL-CCNC: 17 U/L (ref 15–37)
BACTERIA URNS QL MICRO: NEGATIVE /HPF
BASOPHILS # BLD: 0 K/UL (ref 0–0.1)
BASOPHILS NFR BLD: 1 % (ref 0–1)
BILIRUB SERPL-MCNC: 0.8 MG/DL (ref 0.2–1)
BILIRUB UR QL: NEGATIVE
BUN SERPL-MCNC: 22 MG/DL (ref 6–20)
BUN/CREAT SERPL: 15 (ref 12–20)
CALCIUM SERPL-MCNC: 8.9 MG/DL (ref 8.5–10.1)
CHLORIDE SERPL-SCNC: 108 MMOL/L (ref 97–108)
CHOLEST SERPL-MCNC: 99 MG/DL
CO2 SERPL-SCNC: 30 MMOL/L (ref 21–32)
COLOR UR: ABNORMAL
CREAT SERPL-MCNC: 1.42 MG/DL (ref 0.55–1.02)
DIFFERENTIAL METHOD BLD: ABNORMAL
EOSINOPHIL # BLD: 0.2 K/UL (ref 0–0.4)
EOSINOPHIL NFR BLD: 4 % (ref 0–7)
EPITH CASTS URNS QL MICRO: ABNORMAL /LPF
ERYTHROCYTE [DISTWIDTH] IN BLOOD BY AUTOMATED COUNT: 16.6 % (ref 11.5–14.5)
EST. AVERAGE GLUCOSE BLD GHB EST-MCNC: 103 MG/DL
GLOBULIN SER CALC-MCNC: 3.4 G/DL (ref 2–4)
GLUCOSE SERPL-MCNC: 109 MG/DL (ref 65–100)
GLUCOSE UR STRIP.AUTO-MCNC: NEGATIVE MG/DL
HBA1C MFR BLD: 5.2 % (ref 4–5.6)
HCT VFR BLD AUTO: 38.3 % (ref 35–47)
HDLC SERPL-MCNC: 33 MG/DL
HDLC SERPL: 3 (ref 0–5)
HGB BLD-MCNC: 11.3 G/DL (ref 11.5–16)
HGB UR QL STRIP: NEGATIVE
HYALINE CASTS URNS QL MICRO: ABNORMAL /LPF (ref 0–5)
IMM GRANULOCYTES # BLD AUTO: 0 K/UL (ref 0–0.04)
IMM GRANULOCYTES NFR BLD AUTO: 1 % (ref 0–0.5)
KETONES UR QL STRIP.AUTO: NEGATIVE MG/DL
LDLC SERPL CALC-MCNC: 38.2 MG/DL (ref 0–100)
LEUKOCYTE ESTERASE UR QL STRIP.AUTO: ABNORMAL
LYMPHOCYTES # BLD: 0.9 K/UL (ref 0.8–3.5)
LYMPHOCYTES NFR BLD: 17 % (ref 12–49)
MCH RBC QN AUTO: 27.2 PG (ref 26–34)
MCHC RBC AUTO-ENTMCNC: 29.5 G/DL (ref 30–36.5)
MCV RBC AUTO: 92.3 FL (ref 80–99)
MONOCYTES # BLD: 0.4 K/UL (ref 0–1)
MONOCYTES NFR BLD: 7 % (ref 5–13)
NEUTS SEG # BLD: 3.9 K/UL (ref 1.8–8)
NEUTS SEG NFR BLD: 70 % (ref 32–75)
NITRITE UR QL STRIP.AUTO: NEGATIVE
NRBC # BLD: 0 K/UL (ref 0–0.01)
NRBC BLD-RTO: 0 PER 100 WBC
PH UR STRIP: 6 (ref 5–8)
PLATELET # BLD AUTO: 179 K/UL (ref 150–400)
PMV BLD AUTO: 11.8 FL (ref 8.9–12.9)
POTASSIUM SERPL-SCNC: 3.7 MMOL/L (ref 3.5–5.1)
PROT SERPL-MCNC: 6.8 G/DL (ref 6.4–8.2)
PROT UR STRIP-MCNC: ABNORMAL MG/DL
RBC # BLD AUTO: 4.15 M/UL (ref 3.8–5.2)
RBC #/AREA URNS HPF: ABNORMAL /HPF (ref 0–5)
SODIUM SERPL-SCNC: 142 MMOL/L (ref 136–145)
SP GR UR REFRACTOMETRY: 1.02 (ref 1–1.03)
TRIGL SERPL-MCNC: 139 MG/DL
URINE CULTURE IF INDICATED: ABNORMAL
UROBILINOGEN UR QL STRIP.AUTO: 0.2 EU/DL (ref 0.2–1)
VLDLC SERPL CALC-MCNC: 27.8 MG/DL
WBC # BLD AUTO: 5.5 K/UL (ref 3.6–11)
WBC URNS QL MICRO: ABNORMAL /HPF (ref 0–4)

## 2023-10-29 LAB
BACTERIA SPEC CULT: NORMAL
CC UR VC: NORMAL
SERVICE CMNT-IMP: NORMAL

## 2023-11-20 DIAGNOSIS — I25.118 CORONARY ARTERY DISEASE OF NATIVE HEART WITH STABLE ANGINA PECTORIS, UNSPECIFIED VESSEL OR LESION TYPE (HCC): ICD-10-CM

## 2023-11-20 RX ORDER — CARVEDILOL 25 MG/1
25 TABLET ORAL 2 TIMES DAILY WITH MEALS
Qty: 180 TABLET | Refills: 1 | Status: SHIPPED | OUTPATIENT
Start: 2023-11-20

## 2023-11-20 RX ORDER — ISOSORBIDE MONONITRATE 60 MG/1
60 TABLET, EXTENDED RELEASE ORAL DAILY
Qty: 90 TABLET | Refills: 1 | Status: SHIPPED | OUTPATIENT
Start: 2023-11-20

## 2023-11-20 NOTE — TELEPHONE ENCOUNTER
PCP: Parish Dumont APRN - NP    Last appt: Visit date not found  Future Appointments   Date Time Provider Department Center   12/28/2023  8:30 AM Parish Dumont APRN - NP [facility] AMB   6/14/2024  9:00 AM Efrem Charlton DO [facility] BS AMB       Requested Prescriptions     Pending Prescriptions Disp Refills    isosorbide mononitrate (IMDUR) 60 MG extended release tablet [Pharmacy Med Name: ISOSORBIDE MONONITRATE ER 60 MG Tablet Extended Release 24 Hour] 90 tablet 10     Sig: TAKE 1 TABLET EVERY DAY    carvedilol (COREG) 25 MG tablet [Pharmacy Med Name: CARVEDILOL 25 MG Tablet] 180 tablet 10     Sig: TAKE 1 TABLET TWICE DAILY WITH MEALS       Prior labs and Blood pressures:  BP Readings from Last 3 Encounters:   07/28/23 120/80   01/25/23 102/60   07/22/22 100/60     Lab Results   Component Value Date/Time     10/27/2023 09:24 AM    K 3.7 10/27/2023 09:24 AM     10/27/2023 09:24 AM    CO2 30 10/27/2023 09:24 AM    BUN 22 10/27/2023 09:24 AM    GFRAA 35 07/22/2022 08:42 AM     No results found for: \"HBA1C\", \"[id number]\"  Lab Results   Component Value Date/Time    CHOL 99 10/27/2023 09:24 AM    HDL 33 10/27/2023 09:24 AM    VLDL 39 03/08/2021 10:56 AM     No results found for: \"VITD3\", \"VD3RIA\"        Lab Results   Component Value Date/Time    TSH 3.28 01/12/2022 08:50 AM

## 2023-12-04 ENCOUNTER — TELEPHONE (OUTPATIENT)
Facility: CLINIC | Age: 73
End: 2023-12-04

## 2023-12-04 SDOH — ECONOMIC STABILITY: FOOD INSECURITY: WITHIN THE PAST 12 MONTHS, YOU WORRIED THAT YOUR FOOD WOULD RUN OUT BEFORE YOU GOT MONEY TO BUY MORE.: SOMETIMES TRUE

## 2023-12-04 SDOH — ECONOMIC STABILITY: HOUSING INSECURITY
IN THE LAST 12 MONTHS, WAS THERE A TIME WHEN YOU DID NOT HAVE A STEADY PLACE TO SLEEP OR SLEPT IN A SHELTER (INCLUDING NOW)?: NO

## 2023-12-04 SDOH — ECONOMIC STABILITY: FOOD INSECURITY: WITHIN THE PAST 12 MONTHS, THE FOOD YOU BOUGHT JUST DIDN'T LAST AND YOU DIDN'T HAVE MONEY TO GET MORE.: SOMETIMES TRUE

## 2023-12-04 SDOH — ECONOMIC STABILITY: TRANSPORTATION INSECURITY
IN THE PAST 12 MONTHS, HAS LACK OF TRANSPORTATION KEPT YOU FROM MEETINGS, WORK, OR FROM GETTING THINGS NEEDED FOR DAILY LIVING?: NO

## 2023-12-04 SDOH — ECONOMIC STABILITY: INCOME INSECURITY: HOW HARD IS IT FOR YOU TO PAY FOR THE VERY BASICS LIKE FOOD, HOUSING, MEDICAL CARE, AND HEATING?: VERY HARD

## 2023-12-05 ENCOUNTER — OFFICE VISIT (OUTPATIENT)
Facility: CLINIC | Age: 73
End: 2023-12-05
Payer: MEDICARE

## 2023-12-05 VITALS
RESPIRATION RATE: 18 BRPM | OXYGEN SATURATION: 96 % | SYSTOLIC BLOOD PRESSURE: 130 MMHG | DIASTOLIC BLOOD PRESSURE: 80 MMHG | WEIGHT: 236.8 LBS | HEIGHT: 67 IN | TEMPERATURE: 98.6 F | HEART RATE: 80 BPM | BODY MASS INDEX: 37.17 KG/M2

## 2023-12-05 DIAGNOSIS — L30.4 INTERTRIGINOUS DERMATITIS ASSOCIATED WITH MOISTURE: ICD-10-CM

## 2023-12-05 DIAGNOSIS — F41.9 ANXIETY: ICD-10-CM

## 2023-12-05 DIAGNOSIS — E11.40 TYPE 2 DIABETES MELLITUS WITH DIABETIC NEUROPATHY, WITHOUT LONG-TERM CURRENT USE OF INSULIN (HCC): Primary | ICD-10-CM

## 2023-12-05 PROBLEM — G93.41 METABOLIC ENCEPHALOPATHY: Status: RESOLVED | Noted: 2021-06-01 | Resolved: 2023-12-05

## 2023-12-05 PROCEDURE — G8417 CALC BMI ABV UP PARAM F/U: HCPCS | Performed by: NURSE PRACTITIONER

## 2023-12-05 PROCEDURE — 2022F DILAT RTA XM EVC RTNOPTHY: CPT | Performed by: NURSE PRACTITIONER

## 2023-12-05 PROCEDURE — 1090F PRES/ABSN URINE INCON ASSESS: CPT | Performed by: NURSE PRACTITIONER

## 2023-12-05 PROCEDURE — 3075F SYST BP GE 130 - 139MM HG: CPT | Performed by: NURSE PRACTITIONER

## 2023-12-05 PROCEDURE — 3017F COLORECTAL CA SCREEN DOC REV: CPT | Performed by: NURSE PRACTITIONER

## 2023-12-05 PROCEDURE — G8484 FLU IMMUNIZE NO ADMIN: HCPCS | Performed by: NURSE PRACTITIONER

## 2023-12-05 PROCEDURE — G8399 PT W/DXA RESULTS DOCUMENT: HCPCS | Performed by: NURSE PRACTITIONER

## 2023-12-05 PROCEDURE — 1123F ACP DISCUSS/DSCN MKR DOCD: CPT | Performed by: NURSE PRACTITIONER

## 2023-12-05 PROCEDURE — G8427 DOCREV CUR MEDS BY ELIG CLIN: HCPCS | Performed by: NURSE PRACTITIONER

## 2023-12-05 PROCEDURE — 99213 OFFICE O/P EST LOW 20 MIN: CPT | Performed by: NURSE PRACTITIONER

## 2023-12-05 PROCEDURE — 3044F HG A1C LEVEL LT 7.0%: CPT | Performed by: NURSE PRACTITIONER

## 2023-12-05 PROCEDURE — 3079F DIAST BP 80-89 MM HG: CPT | Performed by: NURSE PRACTITIONER

## 2023-12-05 PROCEDURE — 1036F TOBACCO NON-USER: CPT | Performed by: NURSE PRACTITIONER

## 2023-12-05 RX ORDER — NYSTATIN 100000 U/G
CREAM TOPICAL
Qty: 30 G | Refills: 4 | Status: SHIPPED | OUTPATIENT
Start: 2023-12-05 | End: 2023-12-05 | Stop reason: SDUPTHER

## 2023-12-05 RX ORDER — CLONAZEPAM 0.5 MG/1
TABLET ORAL
Qty: 90 TABLET | Refills: 0 | Status: SHIPPED | OUTPATIENT
Start: 2023-12-05 | End: 2023-12-05

## 2023-12-05 RX ORDER — PREGABALIN 300 MG/1
300 CAPSULE ORAL DAILY
Status: ON HOLD | COMMUNITY

## 2023-12-05 RX ORDER — NYSTATIN 100000 U/G
CREAM TOPICAL
Qty: 30 G | Refills: 4 | Status: ON HOLD | OUTPATIENT
Start: 2023-12-05

## 2023-12-05 RX ORDER — CLONAZEPAM 0.5 MG/1
TABLET ORAL
Qty: 90 TABLET | Refills: 0 | Status: ON HOLD | OUTPATIENT
Start: 2023-12-05 | End: 2024-03-04

## 2023-12-05 ASSESSMENT — ENCOUNTER SYMPTOMS
FACIAL SWELLING: 0
CONSTIPATION: 0
NAUSEA: 0
SHORTNESS OF BREATH: 0
PHOTOPHOBIA: 0
EYE REDNESS: 0
COLOR CHANGE: 0
APNEA: 0
RECTAL PAIN: 0
BLOOD IN STOOL: 0
DIARRHEA: 0
SINUS PRESSURE: 0
ABDOMINAL PAIN: 0
VOMITING: 0
BACK PAIN: 0
WHEEZING: 0
TROUBLE SWALLOWING: 0
CHOKING: 0
EYE DISCHARGE: 0
SORE THROAT: 0
EYE PAIN: 0
COUGH: 0
ANAL BLEEDING: 0
SINUS PAIN: 0

## 2023-12-05 NOTE — PROGRESS NOTES
Tash Beltre (: 1950) is a 72 y.o. female here for evaluation of the following chief complaint(s):  Rash (Check rash under breasts )         SUBJECTIVE/OBJECTIVE:  HPI    Ms. Shane De Anda, 73 yo female, here today for four month f/u and concerns of non improving intertriginous dermatitis. She has not been using creams as prescribed. Has been using OTC vagisil anti-itch cream which she has been applying twice a week. Area does not appear to be worse or spreading. PMH includes chronic atrial fibrillation, Watchman device, chronic systolic heart failure, asthma, IBS, type 2 diabetes with nephropathy, dermatitis, pacemaker, intertrigo. Nephrologist:  Dr. Nicole Noel- podiatrist-    Allergies   Allergen Reactions    Sulfa Antibiotics Swelling    Amoxicillin Swelling       Current Outpatient Medications   Medication Sig Dispense Refill    pregabalin (LYRICA) 300 MG capsule Take 1 capsule by mouth daily. clonazePAM (KLONOPIN) 0.5 MG tablet TAKE 1 TABLET BY MOUTH NIGHTLY . DO NOT EXCEED 1 PER 24 HOURS 90 tablet 0    nystatin (MYCOSTATIN) 672533 UNIT/GM cream Apply topically 2 times daily.  30 g 4    isosorbide mononitrate (IMDUR) 60 MG extended release tablet TAKE 1 TABLET EVERY DAY 90 tablet 1    carvedilol (COREG) 25 MG tablet TAKE 1 TABLET TWICE DAILY WITH MEALS 180 tablet 1    triamcinolone (KENALOG) 0.1 % cream APPLY A THIN FILM OF CREAM EXTERNALLY TO THE AFFECTED AREA TWICE DAILY 30 g 10    hydrALAZINE (APRESOLINE) 50 MG tablet       bumetanide (BUMEX) 2 MG tablet Take 1 tablet by mouth daily 90 tablet 1    omeprazole (PRILOSEC) 40 MG delayed release capsule Take 1 capsule by mouth daily 90 capsule 1    potassium chloride (KLOR-CON M) 10 MEQ extended release tablet TAKE 1 TABLET FOUR TIMES DAILY 360 tablet 3    acetaminophen (TYLENOL) 500 MG tablet Take 1 tablet by mouth every 6 hours as needed      albuterol sulfate HFA (PROVENTIL;VENTOLIN;PROAIR) 108 (90 Base) MCG/ACT inhaler Inhale 1 puff

## 2023-12-06 DIAGNOSIS — K58.9 IRRITABLE BOWEL SYNDROME, UNSPECIFIED TYPE: ICD-10-CM

## 2023-12-06 LAB
ALBUMIN SERPL-MCNC: 3.4 G/DL (ref 3.5–5)
ALBUMIN/GLOB SERPL: 1 (ref 1.1–2.2)
ALP SERPL-CCNC: 113 U/L (ref 45–117)
ALT SERPL-CCNC: 12 U/L (ref 12–78)
ANION GAP SERPL CALC-SCNC: 4 MMOL/L (ref 5–15)
AST SERPL-CCNC: 11 U/L (ref 15–37)
BASOPHILS # BLD: 0 K/UL (ref 0–0.1)
BASOPHILS NFR BLD: 0 % (ref 0–1)
BILIRUB SERPL-MCNC: 0.7 MG/DL (ref 0.2–1)
BUN SERPL-MCNC: 26 MG/DL (ref 6–20)
BUN/CREAT SERPL: 16 (ref 12–20)
CALCIUM SERPL-MCNC: 9 MG/DL (ref 8.5–10.1)
CHLORIDE SERPL-SCNC: 111 MMOL/L (ref 97–108)
CO2 SERPL-SCNC: 31 MMOL/L (ref 21–32)
CREAT SERPL-MCNC: 1.63 MG/DL (ref 0.55–1.02)
DIFFERENTIAL METHOD BLD: ABNORMAL
EOSINOPHIL # BLD: 0.6 K/UL (ref 0–0.4)
EOSINOPHIL NFR BLD: 11 % (ref 0–7)
ERYTHROCYTE [DISTWIDTH] IN BLOOD BY AUTOMATED COUNT: 17.2 % (ref 11.5–14.5)
GLOBULIN SER CALC-MCNC: 3.3 G/DL (ref 2–4)
GLUCOSE SERPL-MCNC: 115 MG/DL (ref 65–100)
HCT VFR BLD AUTO: 36.3 % (ref 35–47)
HGB BLD-MCNC: 10.4 G/DL (ref 11.5–16)
IMM GRANULOCYTES # BLD AUTO: 0.1 K/UL (ref 0–0.04)
IMM GRANULOCYTES NFR BLD AUTO: 1 % (ref 0–0.5)
LYMPHOCYTES # BLD: 0.6 K/UL (ref 0.8–3.5)
LYMPHOCYTES NFR BLD: 11 % (ref 12–49)
MCH RBC QN AUTO: 26.1 PG (ref 26–34)
MCHC RBC AUTO-ENTMCNC: 28.7 G/DL (ref 30–36.5)
MCV RBC AUTO: 91.2 FL (ref 80–99)
MONOCYTES # BLD: 0.3 K/UL (ref 0–1)
MONOCYTES NFR BLD: 6 % (ref 5–13)
NEUTS SEG # BLD: 3.8 K/UL (ref 1.8–8)
NEUTS SEG NFR BLD: 71 % (ref 32–75)
NRBC # BLD: 0 K/UL (ref 0–0.01)
NRBC BLD-RTO: 0 PER 100 WBC
PLATELET # BLD AUTO: 161 K/UL (ref 150–400)
PMV BLD AUTO: 12.2 FL (ref 8.9–12.9)
POTASSIUM SERPL-SCNC: 3.9 MMOL/L (ref 3.5–5.1)
PROT SERPL-MCNC: 6.7 G/DL (ref 6.4–8.2)
RBC # BLD AUTO: 3.98 M/UL (ref 3.8–5.2)
RBC MORPH BLD: ABNORMAL
RBC MORPH BLD: ABNORMAL
SODIUM SERPL-SCNC: 146 MMOL/L (ref 136–145)
WBC # BLD AUTO: 5.4 K/UL (ref 3.6–11)

## 2023-12-06 RX ORDER — OMEPRAZOLE 40 MG/1
40 CAPSULE, DELAYED RELEASE ORAL DAILY
Qty: 90 CAPSULE | Refills: 3 | Status: ON HOLD | OUTPATIENT
Start: 2023-12-06

## 2023-12-06 NOTE — TELEPHONE ENCOUNTER
PCP: LIZZY Matute NP    Last appt: 12/5/2023  Future Appointments   Date Time Provider [address]   3/5/2024  9:30 AM LIZZY Matute NP [facility] AMB   6/14/2024  9:00 AM Scarlett Charlton DO [facility] BS AMB       Requested Prescriptions     Pending Prescriptions Disp Refills    omeprazole (PRILOSEC) 40 MG delayed release capsule [Pharmacy Med Name: OMEPRAZOLE 40 MG Capsule Delayed Release] 90 capsule 3     Sig: TAKE 1 CAPSULE EVERY DAY       Prior labs and Blood pressures:  BP Readings from Last 3 Encounters:   12/05/23 130/80   07/28/23 120/80   01/25/23 102/60     Lab Results   Component Value Date/Time     12/05/2023 03:29 PM    K 3.9 12/05/2023 03:29 PM     12/05/2023 03:29 PM    CO2 31 12/05/2023 03:29 PM    BUN 26 12/05/2023 03:29 PM    GFRAA 35 07/22/2022 08:42 AM     No results found for: \"HBA1C\", \"[id number]\"  Lab Results   Component Value Date/Time    CHOL 99 10/27/2023 09:24 AM    HDL 33 10/27/2023 09:24 AM    VLDL 39 03/08/2021 10:56 AM     No results found for: \"VITD3\", \"VD3RIA\"        Lab Results   Component Value Date/Time    TSH 3.28 01/12/2022 08:50 AM

## 2023-12-08 ENCOUNTER — APPOINTMENT (OUTPATIENT)
Facility: HOSPITAL | Age: 73
DRG: 291 | End: 2023-12-08
Payer: MEDICARE

## 2023-12-08 ENCOUNTER — HOSPITAL ENCOUNTER (INPATIENT)
Facility: HOSPITAL | Age: 73
LOS: 5 days | Discharge: HOME | DRG: 291 | End: 2023-12-14
Attending: EMERGENCY MEDICINE | Admitting: STUDENT IN AN ORGANIZED HEALTH CARE EDUCATION/TRAINING PROGRAM
Payer: MEDICARE

## 2023-12-08 DIAGNOSIS — I50.22 CHRONIC SYSTOLIC HEART FAILURE (HCC): Primary | ICD-10-CM

## 2023-12-08 DIAGNOSIS — I51.7 LVH (LEFT VENTRICULAR HYPERTROPHY): ICD-10-CM

## 2023-12-08 DIAGNOSIS — I50.23 ACUTE ON CHRONIC SYSTOLIC CONGESTIVE HEART FAILURE (HCC): ICD-10-CM

## 2023-12-08 DIAGNOSIS — I50.1: ICD-10-CM

## 2023-12-08 DIAGNOSIS — J96.01 ACUTE HYPOXEMIC RESPIRATORY FAILURE (HCC): ICD-10-CM

## 2023-12-08 LAB
ALBUMIN SERPL-MCNC: 3.2 G/DL (ref 3.5–5)
ALBUMIN/GLOB SERPL: 1 (ref 1.1–2.2)
ALP SERPL-CCNC: 120 U/L (ref 45–117)
ALT SERPL-CCNC: 15 U/L (ref 12–78)
ANION GAP SERPL CALC-SCNC: 5 MMOL/L (ref 5–15)
AST SERPL-CCNC: 14 U/L (ref 15–37)
BASOPHILS # BLD: 0 K/UL (ref 0–0.1)
BASOPHILS NFR BLD: 0 % (ref 0–1)
BILIRUB SERPL-MCNC: 1.2 MG/DL (ref 0.2–1)
BUN SERPL-MCNC: 21 MG/DL (ref 6–20)
BUN/CREAT SERPL: 14 (ref 12–20)
CALCIUM SERPL-MCNC: 8.6 MG/DL (ref 8.5–10.1)
CHLORIDE SERPL-SCNC: 110 MMOL/L (ref 97–108)
CO2 SERPL-SCNC: 30 MMOL/L (ref 21–32)
CREAT SERPL-MCNC: 1.48 MG/DL (ref 0.55–1.02)
DIFFERENTIAL METHOD BLD: ABNORMAL
EOSINOPHIL # BLD: 0.5 K/UL (ref 0–0.4)
EOSINOPHIL NFR BLD: 11 % (ref 0–7)
ERYTHROCYTE [DISTWIDTH] IN BLOOD BY AUTOMATED COUNT: 17.5 % (ref 11.5–14.5)
FLUAV AG NPH QL IA: NEGATIVE
FLUBV AG NOSE QL IA: NEGATIVE
GLOBULIN SER CALC-MCNC: 3.1 G/DL (ref 2–4)
GLUCOSE SERPL-MCNC: 133 MG/DL (ref 65–100)
HCT VFR BLD AUTO: 33 % (ref 35–47)
HGB BLD-MCNC: 10 G/DL (ref 11.5–16)
IMM GRANULOCYTES # BLD AUTO: 0 K/UL (ref 0–0.04)
IMM GRANULOCYTES NFR BLD AUTO: 1 % (ref 0–0.5)
LYMPHOCYTES # BLD: 0.5 K/UL (ref 0.8–3.5)
LYMPHOCYTES NFR BLD: 11 % (ref 12–49)
MAGNESIUM SERPL-MCNC: 1.7 MG/DL (ref 1.6–2.4)
MCH RBC QN AUTO: 26.9 PG (ref 26–34)
MCHC RBC AUTO-ENTMCNC: 30.3 G/DL (ref 30–36.5)
MCV RBC AUTO: 88.7 FL (ref 80–99)
MONOCYTES # BLD: 0.3 K/UL (ref 0–1)
MONOCYTES NFR BLD: 6 % (ref 5–13)
NEUTS SEG # BLD: 3.5 K/UL (ref 1.8–8)
NEUTS SEG NFR BLD: 71 % (ref 32–75)
NRBC # BLD: 0 K/UL (ref 0–0.01)
NRBC BLD-RTO: 0 PER 100 WBC
NT PRO BNP: 3022 PG/ML
PLATELET # BLD AUTO: 144 K/UL (ref 150–400)
PMV BLD AUTO: 11.2 FL (ref 8.9–12.9)
POTASSIUM SERPL-SCNC: 3.4 MMOL/L (ref 3.5–5.1)
PROT SERPL-MCNC: 6.3 G/DL (ref 6.4–8.2)
RBC # BLD AUTO: 3.72 M/UL (ref 3.8–5.2)
RBC MORPH BLD: ABNORMAL
SARS-COV-2 RDRP RESP QL NAA+PROBE: NOT DETECTED
SODIUM SERPL-SCNC: 145 MMOL/L (ref 136–145)
SOURCE: NORMAL
TROPONIN I SERPL HS-MCNC: 28 NG/L (ref 0–51)
WBC # BLD AUTO: 4.8 K/UL (ref 3.6–11)

## 2023-12-08 PROCEDURE — 85025 COMPLETE CBC W/AUTO DIFF WBC: CPT

## 2023-12-08 PROCEDURE — 83735 ASSAY OF MAGNESIUM: CPT

## 2023-12-08 PROCEDURE — 93005 ELECTROCARDIOGRAM TRACING: CPT | Performed by: EMERGENCY MEDICINE

## 2023-12-08 PROCEDURE — G0378 HOSPITAL OBSERVATION PER HR: HCPCS

## 2023-12-08 PROCEDURE — 6370000000 HC RX 637 (ALT 250 FOR IP): Performed by: STUDENT IN AN ORGANIZED HEALTH CARE EDUCATION/TRAINING PROGRAM

## 2023-12-08 PROCEDURE — 94761 N-INVAS EAR/PLS OXIMETRY MLT: CPT

## 2023-12-08 PROCEDURE — 99285 EMERGENCY DEPT VISIT HI MDM: CPT

## 2023-12-08 PROCEDURE — 6370000000 HC RX 637 (ALT 250 FOR IP): Performed by: EMERGENCY MEDICINE

## 2023-12-08 PROCEDURE — 96365 THER/PROPH/DIAG IV INF INIT: CPT

## 2023-12-08 PROCEDURE — 96375 TX/PRO/DX INJ NEW DRUG ADDON: CPT

## 2023-12-08 PROCEDURE — 96372 THER/PROPH/DIAG INJ SC/IM: CPT

## 2023-12-08 PROCEDURE — 71045 X-RAY EXAM CHEST 1 VIEW: CPT

## 2023-12-08 PROCEDURE — 83880 ASSAY OF NATRIURETIC PEPTIDE: CPT

## 2023-12-08 PROCEDURE — 36415 COLL VENOUS BLD VENIPUNCTURE: CPT

## 2023-12-08 PROCEDURE — 2500000003 HC RX 250 WO HCPCS: Performed by: EMERGENCY MEDICINE

## 2023-12-08 PROCEDURE — 87635 SARS-COV-2 COVID-19 AMP PRB: CPT

## 2023-12-08 PROCEDURE — 87804 INFLUENZA ASSAY W/OPTIC: CPT

## 2023-12-08 PROCEDURE — 84484 ASSAY OF TROPONIN QUANT: CPT

## 2023-12-08 PROCEDURE — 80053 COMPREHEN METABOLIC PANEL: CPT

## 2023-12-08 PROCEDURE — 6360000002 HC RX W HCPCS: Performed by: STUDENT IN AN ORGANIZED HEALTH CARE EDUCATION/TRAINING PROGRAM

## 2023-12-08 RX ORDER — IPRATROPIUM BROMIDE AND ALBUTEROL SULFATE 2.5; .5 MG/3ML; MG/3ML
1 SOLUTION RESPIRATORY (INHALATION) EVERY 4 HOURS PRN
Status: DISCONTINUED | OUTPATIENT
Start: 2023-12-08 | End: 2023-12-14 | Stop reason: HOSPADM

## 2023-12-08 RX ORDER — ACETAMINOPHEN 325 MG/1
650 TABLET ORAL EVERY 6 HOURS PRN
Status: DISCONTINUED | OUTPATIENT
Start: 2023-12-08 | End: 2023-12-14 | Stop reason: HOSPADM

## 2023-12-08 RX ORDER — ISOSORBIDE MONONITRATE 30 MG/1
60 TABLET, EXTENDED RELEASE ORAL DAILY
Status: DISCONTINUED | OUTPATIENT
Start: 2023-12-09 | End: 2023-12-14 | Stop reason: HOSPADM

## 2023-12-08 RX ORDER — IPRATROPIUM BROMIDE AND ALBUTEROL SULFATE 2.5; .5 MG/3ML; MG/3ML
1 SOLUTION RESPIRATORY (INHALATION)
Status: COMPLETED | OUTPATIENT
Start: 2023-12-08 | End: 2023-12-08

## 2023-12-08 RX ORDER — LANOLIN ALCOHOL/MO/W.PET/CERES
3 CREAM (GRAM) TOPICAL NIGHTLY PRN
Status: DISCONTINUED | OUTPATIENT
Start: 2023-12-08 | End: 2023-12-14 | Stop reason: HOSPADM

## 2023-12-08 RX ORDER — CLONAZEPAM 0.5 MG/1
0.5 TABLET ORAL NIGHTLY PRN
Status: DISCONTINUED | OUTPATIENT
Start: 2023-12-08 | End: 2023-12-14 | Stop reason: HOSPADM

## 2023-12-08 RX ORDER — PANTOPRAZOLE SODIUM 40 MG/1
40 TABLET, DELAYED RELEASE ORAL
Status: DISCONTINUED | OUTPATIENT
Start: 2023-12-09 | End: 2023-12-14 | Stop reason: HOSPADM

## 2023-12-08 RX ORDER — ENOXAPARIN SODIUM 100 MG/ML
30 INJECTION SUBCUTANEOUS 2 TIMES DAILY
Status: DISCONTINUED | OUTPATIENT
Start: 2023-12-08 | End: 2023-12-14 | Stop reason: HOSPADM

## 2023-12-08 RX ORDER — ONDANSETRON 2 MG/ML
4 INJECTION INTRAMUSCULAR; INTRAVENOUS EVERY 6 HOURS PRN
Status: DISCONTINUED | OUTPATIENT
Start: 2023-12-08 | End: 2023-12-14 | Stop reason: HOSPADM

## 2023-12-08 RX ORDER — ONDANSETRON 4 MG/1
4 TABLET, ORALLY DISINTEGRATING ORAL EVERY 8 HOURS PRN
Status: DISCONTINUED | OUTPATIENT
Start: 2023-12-08 | End: 2023-12-14 | Stop reason: HOSPADM

## 2023-12-08 RX ORDER — SODIUM CHLORIDE 0.9 % (FLUSH) 0.9 %
5-40 SYRINGE (ML) INJECTION EVERY 12 HOURS SCHEDULED
Status: DISCONTINUED | OUTPATIENT
Start: 2023-12-08 | End: 2023-12-14 | Stop reason: HOSPADM

## 2023-12-08 RX ORDER — ACETAMINOPHEN 650 MG/1
650 SUPPOSITORY RECTAL EVERY 6 HOURS PRN
Status: DISCONTINUED | OUTPATIENT
Start: 2023-12-08 | End: 2023-12-14 | Stop reason: HOSPADM

## 2023-12-08 RX ORDER — VENLAFAXINE HYDROCHLORIDE 150 MG/1
150 CAPSULE, EXTENDED RELEASE ORAL 2 TIMES DAILY WITH MEALS
Status: DISCONTINUED | OUTPATIENT
Start: 2023-12-09 | End: 2023-12-14 | Stop reason: HOSPADM

## 2023-12-08 RX ORDER — BUMETANIDE 0.25 MG/ML
1 INJECTION INTRAMUSCULAR; INTRAVENOUS ONCE
Status: COMPLETED | OUTPATIENT
Start: 2023-12-08 | End: 2023-12-08

## 2023-12-08 RX ORDER — HYDRALAZINE HYDROCHLORIDE 25 MG/1
75 TABLET, FILM COATED ORAL 3 TIMES DAILY
Status: DISCONTINUED | OUTPATIENT
Start: 2023-12-08 | End: 2023-12-14 | Stop reason: HOSPADM

## 2023-12-08 RX ORDER — POLYETHYLENE GLYCOL 3350 17 G/17G
17 POWDER, FOR SOLUTION ORAL DAILY PRN
Status: DISCONTINUED | OUTPATIENT
Start: 2023-12-08 | End: 2023-12-14 | Stop reason: HOSPADM

## 2023-12-08 RX ORDER — ATORVASTATIN CALCIUM 40 MG/1
40 TABLET, FILM COATED ORAL NIGHTLY
Status: DISCONTINUED | OUTPATIENT
Start: 2023-12-08 | End: 2023-12-14 | Stop reason: HOSPADM

## 2023-12-08 RX ORDER — SODIUM CHLORIDE 0.9 % (FLUSH) 0.9 %
5-40 SYRINGE (ML) INJECTION PRN
Status: DISCONTINUED | OUTPATIENT
Start: 2023-12-08 | End: 2023-12-14 | Stop reason: HOSPADM

## 2023-12-08 RX ORDER — CARVEDILOL 12.5 MG/1
25 TABLET ORAL 2 TIMES DAILY WITH MEALS
Status: DISCONTINUED | OUTPATIENT
Start: 2023-12-09 | End: 2023-12-14 | Stop reason: HOSPADM

## 2023-12-08 RX ORDER — POTASSIUM CHLORIDE 20 MEQ/1
40 TABLET, EXTENDED RELEASE ORAL ONCE
Status: COMPLETED | OUTPATIENT
Start: 2023-12-08 | End: 2023-12-08

## 2023-12-08 RX ORDER — PREGABALIN 100 MG/1
300 CAPSULE ORAL DAILY
Status: DISCONTINUED | OUTPATIENT
Start: 2023-12-09 | End: 2023-12-14 | Stop reason: HOSPADM

## 2023-12-08 RX ORDER — MAGNESIUM SULFATE 1 G/100ML
1000 INJECTION INTRAVENOUS ONCE
Status: COMPLETED | OUTPATIENT
Start: 2023-12-08 | End: 2023-12-08

## 2023-12-08 RX ORDER — SODIUM CHLORIDE 9 MG/ML
INJECTION, SOLUTION INTRAVENOUS PRN
Status: DISCONTINUED | OUTPATIENT
Start: 2023-12-08 | End: 2023-12-14 | Stop reason: HOSPADM

## 2023-12-08 RX ADMIN — ATORVASTATIN CALCIUM 40 MG: 40 TABLET, FILM COATED ORAL at 21:47

## 2023-12-08 RX ADMIN — IPRATROPIUM BROMIDE AND ALBUTEROL SULFATE 1 DOSE: .5; 3 SOLUTION RESPIRATORY (INHALATION) at 17:35

## 2023-12-08 RX ADMIN — ENOXAPARIN SODIUM 30 MG: 100 INJECTION SUBCUTANEOUS at 21:59

## 2023-12-08 RX ADMIN — MAGNESIUM SULFATE HEPTAHYDRATE 1000 MG: 1 INJECTION, SOLUTION INTRAVENOUS at 21:56

## 2023-12-08 RX ADMIN — BUMETANIDE 1 MG: 0.25 INJECTION INTRAMUSCULAR; INTRAVENOUS at 17:37

## 2023-12-08 RX ADMIN — POTASSIUM CHLORIDE 40 MEQ: 1500 TABLET, EXTENDED RELEASE ORAL at 21:47

## 2023-12-08 RX ADMIN — NITROGLYCERIN 1 INCH: 20 OINTMENT TOPICAL at 21:48

## 2023-12-08 RX ADMIN — HYDRALAZINE HYDROCHLORIDE 75 MG: 50 TABLET, FILM COATED ORAL at 21:47

## 2023-12-08 ASSESSMENT — PAIN SCALES - GENERAL
PAINLEVEL_OUTOF10: 0
PAINLEVEL_OUTOF10: 6

## 2023-12-08 NOTE — ED PROVIDER NOTES
occluder is present and completely occludes the appendage. History of migraine     Hypercholesterolemia 01/22/2018    Irritable bowel syndrome     IBS, spinal stenosis    Long-term use of high-risk medication 01/22/2018    Lumbar spinal stenosis     Morbid (severe) obesity due to excess calories (HCC)     Neuropathy     Obesity (BMI 30-39.9) 04/30/2019    Obstructive sleep apnea     uses CPAP    Permanent atrial fibrillation (HCC)     Presence of implantable cardioverter-defibrillator (ICD)     Presence of Watchman left atrial appendage closure device     Type 2 diabetes mellitus with diabetic neuropathy, without long-term current use of insulin ([address]) 06/05/2016    Venous insufficiency     Vitamin B12 deficiency        Past Surgical History:   Procedure Laterality Date    APPENDECTOMY      BREAST BIOPSY Left     about 4-5 years ago from 2022, neg    CHOLECYSTECTOMY  11/15/2018    COLONOSCOPY N/A 3/14/2018    COLONOSCOPY performed by Lilibeth Fuentes MD at [facility] ([address] [facility])      IR KYPHOPLASTY LUMBAR FIRST LEVEL  12/22/2020    IR KYPHOPLASTY LUMBAR FIRST LEVEL 12/22/2020 [facility]    IR KYPHOPLASTY LUMBAR FIRST LEVEL  12/22/2020    IR KYPHOPLASTY THORACIC FIRST LEVEL  1/6/2021    IR KYPHOPLASTY THORACIC FIRST LEVEL 1/6/2021 [facility]    IR KYPHOPLASTY THORACIC FIRST LEVEL  1/6/2021    [facility] LOC BREAST BIOPSY RIGHT Right 5/10/2022    [facility] STEROTACT[facility] LOC BREAST BIOPSY RIGHT 5/10/2022 [facility]    PACEMAKER      CO UNLISTED PROCEDURE CARDIAC SURGERY      stent       Social History     Tobacco Use    Smoking status: Never    Smokeless tobacco: Never   Vaping Use    Vaping Use: Never used   Substance Use Topics    Alcohol use: No    Drug use: No       Allergies: Allergies   Allergen Reactions    Sulfa Antibiotics Swelling    Amoxicillin Swelling     Review of Systems   A Review of Systems was reviewed by me today during this encounter.   Pertinent positive

## 2023-12-08 NOTE — ED NOTES
Purewick applied. Pt resting quietly in dry brief with  at bedside.      Radha Mirza RN  12/08/23 3549

## 2023-12-08 NOTE — ED NOTES
spO2 down to 87% on room air while sleeping. Initiated NC at 2 lpm, spO2 up to 97%. Dr. Akash Murry notified.      Jaylon Jones RN  12/08/23 5422

## 2023-12-09 PROBLEM — I50.9 ACUTE CONGESTIVE HEART FAILURE, UNSPECIFIED HEART FAILURE TYPE (HCC): Status: ACTIVE | Noted: 2023-12-09

## 2023-12-09 LAB
ANION GAP SERPL CALC-SCNC: 4 MMOL/L (ref 5–15)
BASOPHILS # BLD: 0 K/UL (ref 0–0.1)
BASOPHILS NFR BLD: 0 % (ref 0–1)
BUN SERPL-MCNC: 20 MG/DL (ref 6–20)
BUN/CREAT SERPL: 14 (ref 12–20)
CALCIUM SERPL-MCNC: 8.8 MG/DL (ref 8.5–10.1)
CHLORIDE SERPL-SCNC: 108 MMOL/L (ref 97–108)
CO2 SERPL-SCNC: 30 MMOL/L (ref 21–32)
CREAT SERPL-MCNC: 1.48 MG/DL (ref 0.55–1.02)
DIFFERENTIAL METHOD BLD: ABNORMAL
EOSINOPHIL # BLD: 0.5 K/UL (ref 0–0.4)
EOSINOPHIL NFR BLD: 9 % (ref 0–7)
ERYTHROCYTE [DISTWIDTH] IN BLOOD BY AUTOMATED COUNT: 17.4 % (ref 11.5–14.5)
FERRITIN SERPL-MCNC: 36 NG/ML (ref 26–388)
GLUCOSE SERPL-MCNC: 178 MG/DL (ref 65–100)
HCT VFR BLD AUTO: 30.6 % (ref 35–47)
HGB BLD-MCNC: 9.3 G/DL (ref 11.5–16)
IMM GRANULOCYTES # BLD AUTO: 0.1 K/UL (ref 0–0.04)
IMM GRANULOCYTES NFR BLD AUTO: 1 % (ref 0–0.5)
IRON SATN MFR SERPL: 14 % (ref 20–50)
IRON SERPL-MCNC: 48 UG/DL (ref 35–150)
LYMPHOCYTES # BLD: 0.4 K/UL (ref 0.8–3.5)
LYMPHOCYTES NFR BLD: 8 % (ref 12–49)
MAGNESIUM SERPL-MCNC: 2.1 MG/DL (ref 1.6–2.4)
MCH RBC QN AUTO: 26.9 PG (ref 26–34)
MCHC RBC AUTO-ENTMCNC: 30.4 G/DL (ref 30–36.5)
MCV RBC AUTO: 88.4 FL (ref 80–99)
MONOCYTES # BLD: 0.3 K/UL (ref 0–1)
MONOCYTES NFR BLD: 6 % (ref 5–13)
NEUTS SEG # BLD: 4.1 K/UL (ref 1.8–8)
NEUTS SEG NFR BLD: 76 % (ref 32–75)
NRBC # BLD: 0 K/UL (ref 0–0.01)
NRBC BLD-RTO: 0 PER 100 WBC
PLATELET # BLD AUTO: 148 K/UL (ref 150–400)
PMV BLD AUTO: 11.4 FL (ref 8.9–12.9)
POTASSIUM SERPL-SCNC: 3.2 MMOL/L (ref 3.5–5.1)
RBC # BLD AUTO: 3.46 M/UL (ref 3.8–5.2)
RBC MORPH BLD: ABNORMAL
SODIUM SERPL-SCNC: 142 MMOL/L (ref 136–145)
TIBC SERPL-MCNC: 347 UG/DL (ref 250–450)
WBC # BLD AUTO: 5.4 K/UL (ref 3.6–11)

## 2023-12-09 PROCEDURE — G0378 HOSPITAL OBSERVATION PER HR: HCPCS

## 2023-12-09 PROCEDURE — 94660 CPAP INITIATION&MGMT: CPT

## 2023-12-09 PROCEDURE — 80048 BASIC METABOLIC PNL TOTAL CA: CPT

## 2023-12-09 PROCEDURE — 1100000000 HC RM PRIVATE

## 2023-12-09 PROCEDURE — 5A09457 ASSISTANCE WITH RESPIRATORY VENTILATION, 24-96 CONSECUTIVE HOURS, CONTINUOUS POSITIVE AIRWAY PRESSURE: ICD-10-PCS | Performed by: INTERNAL MEDICINE

## 2023-12-09 PROCEDURE — 6360000002 HC RX W HCPCS: Performed by: STUDENT IN AN ORGANIZED HEALTH CARE EDUCATION/TRAINING PROGRAM

## 2023-12-09 PROCEDURE — 2700000000 HC OXYGEN THERAPY PER DAY

## 2023-12-09 PROCEDURE — 6370000000 HC RX 637 (ALT 250 FOR IP): Performed by: STUDENT IN AN ORGANIZED HEALTH CARE EDUCATION/TRAINING PROGRAM

## 2023-12-09 PROCEDURE — 83540 ASSAY OF IRON: CPT

## 2023-12-09 PROCEDURE — 94761 N-INVAS EAR/PLS OXIMETRY MLT: CPT

## 2023-12-09 PROCEDURE — 85025 COMPLETE CBC W/AUTO DIFF WBC: CPT

## 2023-12-09 PROCEDURE — 2580000003 HC RX 258: Performed by: STUDENT IN AN ORGANIZED HEALTH CARE EDUCATION/TRAINING PROGRAM

## 2023-12-09 PROCEDURE — 83550 IRON BINDING TEST: CPT

## 2023-12-09 PROCEDURE — 2500000003 HC RX 250 WO HCPCS: Performed by: STUDENT IN AN ORGANIZED HEALTH CARE EDUCATION/TRAINING PROGRAM

## 2023-12-09 PROCEDURE — 83735 ASSAY OF MAGNESIUM: CPT

## 2023-12-09 PROCEDURE — 36415 COLL VENOUS BLD VENIPUNCTURE: CPT

## 2023-12-09 PROCEDURE — 94640 AIRWAY INHALATION TREATMENT: CPT

## 2023-12-09 PROCEDURE — 82728 ASSAY OF FERRITIN: CPT

## 2023-12-09 RX ORDER — BUMETANIDE 1 MG/1
2 TABLET ORAL DAILY
Status: DISCONTINUED | OUTPATIENT
Start: 2023-12-09 | End: 2023-12-14 | Stop reason: HOSPADM

## 2023-12-09 RX ORDER — POTASSIUM CHLORIDE 20 MEQ/1
40 TABLET, EXTENDED RELEASE ORAL ONCE
Status: COMPLETED | OUTPATIENT
Start: 2023-12-09 | End: 2023-12-09

## 2023-12-09 RX ADMIN — HYDRALAZINE HYDROCHLORIDE 75 MG: 50 TABLET, FILM COATED ORAL at 13:56

## 2023-12-09 RX ADMIN — ARFORMOTEROL TARTRATE: 15 SOLUTION RESPIRATORY (INHALATION) at 08:23

## 2023-12-09 RX ADMIN — POTASSIUM BICARBONATE 40 MEQ: 782 TABLET, EFFERVESCENT ORAL at 09:02

## 2023-12-09 RX ADMIN — ACETAMINOPHEN 650 MG: 325 TABLET ORAL at 01:36

## 2023-12-09 RX ADMIN — VENLAFAXINE HYDROCHLORIDE 150 MG: 150 CAPSULE, EXTENDED RELEASE ORAL at 17:13

## 2023-12-09 RX ADMIN — POTASSIUM CHLORIDE 40 MEQ: 1500 TABLET, EXTENDED RELEASE ORAL at 04:09

## 2023-12-09 RX ADMIN — ENOXAPARIN SODIUM 30 MG: 100 INJECTION SUBCUTANEOUS at 08:55

## 2023-12-09 RX ADMIN — ISOSORBIDE MONONITRATE 60 MG: 30 TABLET, EXTENDED RELEASE ORAL at 08:54

## 2023-12-09 RX ADMIN — CARVEDILOL 25 MG: 12.5 TABLET, FILM COATED ORAL at 08:53

## 2023-12-09 RX ADMIN — ATORVASTATIN CALCIUM 40 MG: 40 TABLET, FILM COATED ORAL at 21:18

## 2023-12-09 RX ADMIN — BUMETANIDE 2 MG: 1 TABLET ORAL at 14:31

## 2023-12-09 RX ADMIN — CARVEDILOL 25 MG: 12.5 TABLET, FILM COATED ORAL at 17:13

## 2023-12-09 RX ADMIN — PREGABALIN 300 MG: 100 CAPSULE ORAL at 08:54

## 2023-12-09 RX ADMIN — HYDRALAZINE HYDROCHLORIDE 75 MG: 50 TABLET, FILM COATED ORAL at 21:18

## 2023-12-09 RX ADMIN — MICONAZOLE NITRATE: 2 POWDER TOPICAL at 21:23

## 2023-12-09 RX ADMIN — MICONAZOLE NITRATE: 2 POWDER TOPICAL at 03:31

## 2023-12-09 RX ADMIN — MICONAZOLE NITRATE: 2 POWDER TOPICAL at 08:55

## 2023-12-09 RX ADMIN — SODIUM CHLORIDE, PRESERVATIVE FREE 10 ML: 5 INJECTION INTRAVENOUS at 08:54

## 2023-12-09 RX ADMIN — PANTOPRAZOLE SODIUM 40 MG: 40 TABLET, DELAYED RELEASE ORAL at 06:17

## 2023-12-09 RX ADMIN — HYDRALAZINE HYDROCHLORIDE 75 MG: 50 TABLET, FILM COATED ORAL at 08:53

## 2023-12-09 RX ADMIN — ENOXAPARIN SODIUM 30 MG: 100 INJECTION SUBCUTANEOUS at 21:18

## 2023-12-09 RX ADMIN — VENLAFAXINE HYDROCHLORIDE 150 MG: 150 CAPSULE, EXTENDED RELEASE ORAL at 08:53

## 2023-12-09 RX ADMIN — ACETAMINOPHEN 650 MG: 325 TABLET ORAL at 17:20

## 2023-12-09 RX ADMIN — ARFORMOTEROL TARTRATE: 15 SOLUTION RESPIRATORY (INHALATION) at 21:29

## 2023-12-09 ASSESSMENT — PAIN SCALES - GENERAL
PAINLEVEL_OUTOF10: 0
PAINLEVEL_OUTOF10: 0
PAINLEVEL_OUTOF10: 5
PAINLEVEL_OUTOF10: 3
PAINLEVEL_OUTOF10: 0
PAINLEVEL_OUTOF10: 0

## 2023-12-09 ASSESSMENT — PAIN DESCRIPTION - ORIENTATION: ORIENTATION: OUTER

## 2023-12-09 ASSESSMENT — PAIN DESCRIPTION - DESCRIPTORS
DESCRIPTORS: SORE;POUNDING
DESCRIPTORS: ACHING;DISCOMFORT

## 2023-12-09 ASSESSMENT — PAIN DESCRIPTION - LOCATION
LOCATION: HEAD
LOCATION: HEAD

## 2023-12-09 NOTE — ED NOTES
INPATIENT BEHAVIORAL HEALTH PROGRESS NOTE/EVALUATION    Patient: Velasquez Jasso Date: 2019   : 1984 Attending: Cristi Hutson MD   35 year old male      Chief Complaint: \"better\"    Subjective: Mr Jasso continues to report improvement in his withdrawals. He will not be able to do residential program because he does not have the benefits. Team is working to see if he would qualify for CRC as step down. He has been attending groups and was commended for this. No medication changes indicated this time. He has been finding gabapentin to be helpful for his anxiety.    Medication changes: None.     Psychiatric ROS:  - Depression: Patient reports feeling hopeless, helpless, decreased concentration, decreased energy, guilty feelings, decreased interest.  - Anxiety: Patient reports feeling tense at shoulders/neck, worrying about day to day things, feeling fatigue, restless, irritable  - Appetite: fair  - Sleep: poor  - Auditory/Visual hallucinations/Paranoia: Denies  - Suicidal thoughts:  Denies  - Homicidal thoughts: Denies    Precautions: q 15 minutes - Reason: Safekeeping    Treatment: individual tx group tx    Laboratory Studies:     Lab Results   Component Value Date    WBC 6.6 2019    HGB 13.8 2019    HCT 40.3 2019     (L) 2019    SODIUM 142 2019    POTASSIUM 4.2 2019    CHLORIDE 104 2019    CO2 24 2019    GLUCOSE 104 (H) 2019    BUN 11 2019    CREATININE 0.79 2019    CALCIUM 8.6 2019    ALBUMIN 4.0 2019    MG 2.1 2012    BILIRUBIN 0.3 2019    ALKPT 58 2019     (H) 2019     (H) 2019    PHOS 4.3 2011     CIWA Scores:   Patient Vitals for the past 1000 hrs:   CIWA-Ar Total Score   19 0600 1   19 2230 3   19 1630 6   19 1025 5   19 0400 4   19 6   19 1700 4   19 1430 3   19 1025 4   19 0500 9       Medication  Patient's BP is trending up; 193/87;  Informed EDMD.     Gail Jimenez RN  12/08/23 7559 Side Effects: Absent    Medical ROS: Denies shortness of breath, chest pain, nausea and vomiting.      Social History:   Family/Friends: Limited support  Relationships/children:  single  Job: not working  Living situation: homeless  Current stressors: See HPI for details.  Guns access: denies  Past/current legal issues: denies    Assessment for Harm to Self/Others: Moderate to severe risk unless remains hospitalized.    Vital 24 Hour Range Most Recent Value   Temperature Temp  Min: 98.7 °F (37.1 °C)  Max: 98.7 °F (37.1 °C) 98.7 °F (37.1 °C) (07/02/19 0722)   Pulse Pulse  Min: 58  Max: 58 58 (07/02/19 0724)   Respiratory Resp  Min: 16  Max: 16 16 (07/02/19 0722)   Non-Invasive  Blood Pressure BP  Min: 116/74  Max: 120/75 120/75 (07/02/19 0724)   Pulse Oximetry SpO2  Min: 99 %  Max: 99 % 99 % (07/02/19 0722)   O2 No data recorded       Mental Status Exam:   General appearance: Distressed  Speech  soft  Volume: soft  Latency: prolonged  Mood/affect: depressed, anxious  Psychomotor: slow  Associations: intact  Thought process: intact  Thought content: Denies HI/ SI.   Perceptual disorders/hallucinations: none  Orientation: oriented to person, place, time, and general circumstances  Attention: decreased  Concentration: decreased  Language: Normal  Fund of knowledge: average  Memory: Inability to recall events of past 24 hours, or significant memory loss  Insight: poor, as evidenced by patients' lack of insight into his own illness  Judgement: poor, as evidenced by lack of engagement in treatment    Assessment:  Alcohol Use Disorder  Unspecified Depressive Disorder  Rule out cannabis use disorder       Case discussed in: treatment team    Patient Progress and Treatment Changes: Slow    Reason for continued hospitalization:  Detox.    Anticipated discharge to: PHP vs IOP vs Outpatient care.     Anticipated Discharge Date: 7/3/19    This information is confidential and disclosure without patient consent or statutory  authorization is prohibited by law.    Cristi Hutson MD  Inpatient Psychiatry  Pager: [phone]

## 2023-12-09 NOTE — H&P
Hospitalist Admission Note    NAME:  Andry Velazco   :  1950   MRN:  [id number]     Date/Time:  2023 9:22 PM    Patient PCP: LIZZY Moreno NP    ______________________________________________________________________  Given the patient's current clinical presentation, I have a high level of concern for decompensation if discharged from the emergency department. Complex decision making was performed, which includes reviewing the patient's available past medical records, laboratory results, and x-ray films. My assessment of this patient's clinical condition and my plan of care is as follows. Assessment / Plan: Active Problems:  Acute hypoxemic respiratory failure-suspect multifactorial etiology  Mild combined heart failure exacerbation-EF 40-45%  Hypertensive urgency/emergency  CAD  Essential hypertension  Hyperlipidemia  COPD/asthma overlap  LEELA compliant with CPAP  Obesity class II by BMI 37.5  CKD 3, baseline  MDD/FELIPA/Insomnia  GERD    Plan:  Acute hypoxemic respiratory failure-suspect multifactorial etiology  Mild combined heart failure exacerbation-EF 40-45%  Hypertensive urgency/emergency  CAD  Essential hypertension  Hyperlipidemia  Admit to telemetry observation  Continuous pulse oximetry and supplemental oxygen as needed maintain saturations greater than 90%  Pulmonary toilet  IV Bumex given in ED with adequate diuretic response  -Reassess respiratory status in the morning and repeat dose as needed or resume PTA p.o.  Bumex  Continue PTA aspirin, Coreg, hydralazine, Imdur  Trial Nitropaste given hypertensive urgency/emergency range    COPD/asthma overlap  Formulary substitution Dulera  DuoNeb as needed    LEELA compliant with CPAP  Nocturnal CPAP ordered    Obesity class II by BMI 37.5  Recommend medically assisted weight loss in outpatient setting    CKD 3, baseline  Trend renal function  Renally dose medications and avoid nephrotoxic agents    MDD/FELIPA/Insomnia  Continue

## 2023-12-10 LAB
ANION GAP SERPL CALC-SCNC: 7 MMOL/L (ref 5–15)
BASOPHILS # BLD: 0 K/UL (ref 0–0.1)
BASOPHILS NFR BLD: 1 % (ref 0–1)
BUN SERPL-MCNC: 21 MG/DL (ref 6–20)
BUN/CREAT SERPL: 15 (ref 12–20)
CALCIUM SERPL-MCNC: 9 MG/DL (ref 8.5–10.1)
CHLORIDE SERPL-SCNC: 109 MMOL/L (ref 97–108)
CO2 SERPL-SCNC: 30 MMOL/L (ref 21–32)
CREAT SERPL-MCNC: 1.41 MG/DL (ref 0.55–1.02)
DIFFERENTIAL METHOD BLD: ABNORMAL
EOSINOPHIL # BLD: 0.6 K/UL (ref 0–0.4)
EOSINOPHIL NFR BLD: 12 % (ref 0–7)
ERYTHROCYTE [DISTWIDTH] IN BLOOD BY AUTOMATED COUNT: 17.7 % (ref 11.5–14.5)
GLUCOSE SERPL-MCNC: 116 MG/DL (ref 65–100)
HCT VFR BLD AUTO: 31.5 % (ref 35–47)
HGB BLD-MCNC: 9.5 G/DL (ref 11.5–16)
IMM GRANULOCYTES # BLD AUTO: 0 K/UL (ref 0–0.04)
IMM GRANULOCYTES NFR BLD AUTO: 1 % (ref 0–0.5)
LYMPHOCYTES # BLD: 0.5 K/UL (ref 0.8–3.5)
LYMPHOCYTES NFR BLD: 11 % (ref 12–49)
MCH RBC QN AUTO: 26.8 PG (ref 26–34)
MCHC RBC AUTO-ENTMCNC: 30.2 G/DL (ref 30–36.5)
MCV RBC AUTO: 89 FL (ref 80–99)
MONOCYTES # BLD: 0.3 K/UL (ref 0–1)
MONOCYTES NFR BLD: 6 % (ref 5–13)
NEUTS SEG # BLD: 3.1 K/UL (ref 1.8–8)
NEUTS SEG NFR BLD: 69 % (ref 32–75)
NRBC # BLD: 0 K/UL (ref 0–0.01)
NRBC BLD-RTO: 0 PER 100 WBC
PLATELET # BLD AUTO: 143 K/UL (ref 150–400)
PMV BLD AUTO: 11.6 FL (ref 8.9–12.9)
POTASSIUM SERPL-SCNC: 3.6 MMOL/L (ref 3.5–5.1)
RBC # BLD AUTO: 3.54 M/UL (ref 3.8–5.2)
SODIUM SERPL-SCNC: 146 MMOL/L (ref 136–145)
WBC # BLD AUTO: 4.4 K/UL (ref 3.6–11)

## 2023-12-10 PROCEDURE — 85025 COMPLETE CBC W/AUTO DIFF WBC: CPT

## 2023-12-10 PROCEDURE — 94761 N-INVAS EAR/PLS OXIMETRY MLT: CPT

## 2023-12-10 PROCEDURE — 36415 COLL VENOUS BLD VENIPUNCTURE: CPT

## 2023-12-10 PROCEDURE — 1100000000 HC RM PRIVATE

## 2023-12-10 PROCEDURE — 94640 AIRWAY INHALATION TREATMENT: CPT

## 2023-12-10 PROCEDURE — 6360000002 HC RX W HCPCS: Performed by: STUDENT IN AN ORGANIZED HEALTH CARE EDUCATION/TRAINING PROGRAM

## 2023-12-10 PROCEDURE — 6370000000 HC RX 637 (ALT 250 FOR IP): Performed by: STUDENT IN AN ORGANIZED HEALTH CARE EDUCATION/TRAINING PROGRAM

## 2023-12-10 PROCEDURE — 2700000000 HC OXYGEN THERAPY PER DAY

## 2023-12-10 PROCEDURE — 80048 BASIC METABOLIC PNL TOTAL CA: CPT

## 2023-12-10 RX ADMIN — CARVEDILOL 25 MG: 12.5 TABLET, FILM COATED ORAL at 17:00

## 2023-12-10 RX ADMIN — ISOSORBIDE MONONITRATE 60 MG: 30 TABLET, EXTENDED RELEASE ORAL at 08:53

## 2023-12-10 RX ADMIN — VENLAFAXINE HYDROCHLORIDE 150 MG: 150 CAPSULE, EXTENDED RELEASE ORAL at 08:53

## 2023-12-10 RX ADMIN — HYDRALAZINE HYDROCHLORIDE 75 MG: 50 TABLET, FILM COATED ORAL at 08:53

## 2023-12-10 RX ADMIN — CARVEDILOL 25 MG: 12.5 TABLET, FILM COATED ORAL at 08:53

## 2023-12-10 RX ADMIN — MICONAZOLE NITRATE: 2 POWDER TOPICAL at 20:38

## 2023-12-10 RX ADMIN — ARFORMOTEROL TARTRATE: 15 SOLUTION RESPIRATORY (INHALATION) at 20:28

## 2023-12-10 RX ADMIN — ENOXAPARIN SODIUM 30 MG: 100 INJECTION SUBCUTANEOUS at 20:37

## 2023-12-10 RX ADMIN — BUMETANIDE 2 MG: 1 TABLET ORAL at 08:53

## 2023-12-10 RX ADMIN — HYDRALAZINE HYDROCHLORIDE 75 MG: 50 TABLET, FILM COATED ORAL at 20:37

## 2023-12-10 RX ADMIN — ENOXAPARIN SODIUM 30 MG: 100 INJECTION SUBCUTANEOUS at 08:52

## 2023-12-10 RX ADMIN — ARFORMOTEROL TARTRATE: 15 SOLUTION RESPIRATORY (INHALATION) at 07:43

## 2023-12-10 RX ADMIN — VENLAFAXINE HYDROCHLORIDE 150 MG: 150 CAPSULE, EXTENDED RELEASE ORAL at 17:00

## 2023-12-10 RX ADMIN — ATORVASTATIN CALCIUM 40 MG: 40 TABLET, FILM COATED ORAL at 20:37

## 2023-12-10 RX ADMIN — PANTOPRAZOLE SODIUM 40 MG: 40 TABLET, DELAYED RELEASE ORAL at 06:20

## 2023-12-10 RX ADMIN — HYDRALAZINE HYDROCHLORIDE 75 MG: 50 TABLET, FILM COATED ORAL at 13:54

## 2023-12-10 RX ADMIN — MICONAZOLE NITRATE: 2 POWDER TOPICAL at 08:54

## 2023-12-10 RX ADMIN — ACETAMINOPHEN 650 MG: 325 TABLET ORAL at 20:37

## 2023-12-10 RX ADMIN — PREGABALIN 300 MG: 100 CAPSULE ORAL at 08:53

## 2023-12-10 ASSESSMENT — PAIN DESCRIPTION - DESCRIPTORS: DESCRIPTORS: ACHING

## 2023-12-10 ASSESSMENT — PAIN SCALES - GENERAL
PAINLEVEL_OUTOF10: 4
PAINLEVEL_OUTOF10: 0
PAINLEVEL_OUTOF10: 0

## 2023-12-10 ASSESSMENT — PAIN DESCRIPTION - LOCATION: LOCATION: HEAD

## 2023-12-10 NOTE — CARE COORDINATION
If unable to wean oxygen successfully and home setup is needed prior to discharge, walking oxygen challenge documentation will be required per Medicare guidelines. Home Oxygen Test  Date of test:   Time of test:      Sa02 ##% on room air AT REST. Sa02 ##% on room air DURING AMBULATION. Sa02 ##% on _ Liters DURING AMBULATION. Sa02 ##% on _ Liters AT REST/AFTER AMBULATION. Exact percentages, no ranges.    If they need 4-5 liters they usually want you to test under 4-5 and then with 4-5 too    Fawad Bourne, [address] [address] Manager  [phone]

## 2023-12-11 ENCOUNTER — APPOINTMENT (OUTPATIENT)
Facility: HOSPITAL | Age: 73
DRG: 291 | End: 2023-12-11
Attending: STUDENT IN AN ORGANIZED HEALTH CARE EDUCATION/TRAINING PROGRAM
Payer: MEDICARE

## 2023-12-11 LAB
ANION GAP SERPL CALC-SCNC: 5 MMOL/L (ref 5–15)
BASOPHILS # BLD: 0 K/UL (ref 0–0.1)
BASOPHILS NFR BLD: 1 % (ref 0–1)
BUN SERPL-MCNC: 22 MG/DL (ref 6–20)
BUN/CREAT SERPL: 16 (ref 12–20)
CALCIUM SERPL-MCNC: 9 MG/DL (ref 8.5–10.1)
CHLORIDE SERPL-SCNC: 110 MMOL/L (ref 97–108)
CO2 SERPL-SCNC: 29 MMOL/L (ref 21–32)
CREAT SERPL-MCNC: 1.41 MG/DL (ref 0.55–1.02)
DIFFERENTIAL METHOD BLD: ABNORMAL
ECHO AO ASC DIAM: 3.9 CM
ECHO AO ASCENDING AORTA INDEX: 1.79 CM/M2
ECHO AO ROOT DIAM: 3.8 CM
ECHO AO ROOT INDEX: 1.74 CM/M2
ECHO AV AREA PEAK VELOCITY: 0.7 CM2
ECHO AV AREA PEAK VELOCITY: 0.8 CM2
ECHO AV MEAN GRADIENT: 29 MMHG
ECHO AV MEAN VELOCITY: 2.6 M/S
ECHO AV PEAK GRADIENT: 49 MMHG
ECHO AV PEAK GRADIENT: 53 MMHG
ECHO AV PEAK VELOCITY: 3.5 M/S
ECHO AV PEAK VELOCITY: 3.6 M/S
ECHO AV VTI: 79.3 CM
ECHO BSA: 2.27 M2
ECHO LA DIAMETER INDEX: 1.79 CM/M2
ECHO LA DIAMETER: 3.9 CM
ECHO LA TO AORTIC ROOT RATIO: 1.03
ECHO LV E' LATERAL VELOCITY: 3 CM/S
ECHO LV E' SEPTAL VELOCITY: 6 CM/S
ECHO LV EDV A4C: 76 ML
ECHO LV EDV INDEX A4C: 35 ML/M2
ECHO LV EJECTION FRACTION A4C: 62 %
ECHO LV ESV A4C: 29 ML
ECHO LV ESV INDEX A4C: 13 ML/M2
ECHO LV FRACTIONAL SHORTENING: 18 % (ref 28–44)
ECHO LV INTERNAL DIMENSION DIASTOLE INDEX: 2.06 CM/M2
ECHO LV INTERNAL DIMENSION DIASTOLIC: 4.5 CM (ref 3.9–5.3)
ECHO LV INTERNAL DIMENSION SYSTOLIC INDEX: 1.7 CM/M2
ECHO LV INTERNAL DIMENSION SYSTOLIC: 3.7 CM
ECHO LV IVSD: 1.1 CM (ref 0.6–0.9)
ECHO LV MASS 2D: 210.2 G (ref 67–162)
ECHO LV MASS INDEX 2D: 96.4 G/M2 (ref 43–95)
ECHO LV POSTERIOR WALL DIASTOLIC: 1.4 CM (ref 0.6–0.9)
ECHO LV RELATIVE WALL THICKNESS RATIO: 0.62
ECHO LVOT AREA: 3.8 CM2
ECHO LVOT DIAM: 2.2 CM
ECHO LVOT PEAK GRADIENT: 2 MMHG
ECHO LVOT PEAK VELOCITY: 0.7 M/S
ECHO MV A VELOCITY: 0.38 M/S
ECHO MV E DECELERATION TIME (DT): 147.6 MS
ECHO MV E VELOCITY: 1.17 M/S
ECHO MV E/A RATIO: 3.08
ECHO MV E/E' LATERAL: 39
ECHO MV E/E' RATIO (AVERAGED): 29.25
ECHO PV MAX VELOCITY: 1 M/S
ECHO PV PEAK GRADIENT: 4 MMHG
ECHO RV FREE WALL PEAK S': 12 CM/S
ECHO RV TAPSE: 1.3 CM (ref 1.7–?)
ECHO TV REGURGITANT MAX VELOCITY: 3.78 M/S
ECHO TV REGURGITANT PEAK GRADIENT: 58 MMHG
EOSINOPHIL # BLD: 0.6 K/UL (ref 0–0.4)
EOSINOPHIL NFR BLD: 13 % (ref 0–7)
ERYTHROCYTE [DISTWIDTH] IN BLOOD BY AUTOMATED COUNT: 17.7 % (ref 11.5–14.5)
GLUCOSE SERPL-MCNC: 123 MG/DL (ref 65–100)
HCT VFR BLD AUTO: 31.2 % (ref 35–47)
HGB BLD-MCNC: 9.3 G/DL (ref 11.5–16)
IMM GRANULOCYTES # BLD AUTO: 0 K/UL (ref 0–0.04)
IMM GRANULOCYTES NFR BLD AUTO: 1 % (ref 0–0.5)
LYMPHOCYTES # BLD: 0.5 K/UL (ref 0.8–3.5)
LYMPHOCYTES NFR BLD: 12 % (ref 12–49)
MCH RBC QN AUTO: 26.3 PG (ref 26–34)
MCHC RBC AUTO-ENTMCNC: 29.8 G/DL (ref 30–36.5)
MCV RBC AUTO: 88.4 FL (ref 80–99)
MONOCYTES # BLD: 0.3 K/UL (ref 0–1)
MONOCYTES NFR BLD: 7 % (ref 5–13)
NEUTS SEG # BLD: 2.9 K/UL (ref 1.8–8)
NEUTS SEG NFR BLD: 66 % (ref 32–75)
NRBC # BLD: 0 K/UL (ref 0–0.01)
NRBC BLD-RTO: 0 PER 100 WBC
PLATELET # BLD AUTO: 141 K/UL (ref 150–400)
PMV BLD AUTO: 11.7 FL (ref 8.9–12.9)
POTASSIUM SERPL-SCNC: 3.2 MMOL/L (ref 3.5–5.1)
RBC # BLD AUTO: 3.53 M/UL (ref 3.8–5.2)
SODIUM SERPL-SCNC: 144 MMOL/L (ref 136–145)
WBC # BLD AUTO: 4.4 K/UL (ref 3.6–11)

## 2023-12-11 PROCEDURE — 2700000000 HC OXYGEN THERAPY PER DAY

## 2023-12-11 PROCEDURE — 93306 TTE W/DOPPLER COMPLETE: CPT

## 2023-12-11 PROCEDURE — 94640 AIRWAY INHALATION TREATMENT: CPT

## 2023-12-11 PROCEDURE — 6370000000 HC RX 637 (ALT 250 FOR IP): Performed by: STUDENT IN AN ORGANIZED HEALTH CARE EDUCATION/TRAINING PROGRAM

## 2023-12-11 PROCEDURE — 36415 COLL VENOUS BLD VENIPUNCTURE: CPT

## 2023-12-11 PROCEDURE — 85025 COMPLETE CBC W/AUTO DIFF WBC: CPT

## 2023-12-11 PROCEDURE — 6360000002 HC RX W HCPCS: Performed by: STUDENT IN AN ORGANIZED HEALTH CARE EDUCATION/TRAINING PROGRAM

## 2023-12-11 PROCEDURE — 94761 N-INVAS EAR/PLS OXIMETRY MLT: CPT

## 2023-12-11 PROCEDURE — 94660 CPAP INITIATION&MGMT: CPT

## 2023-12-11 PROCEDURE — 1100000000 HC RM PRIVATE

## 2023-12-11 PROCEDURE — 80048 BASIC METABOLIC PNL TOTAL CA: CPT

## 2023-12-11 RX ORDER — CASTOR OIL AND BALSAM, PERU 788; 87 MG/G; MG/G
OINTMENT TOPICAL 2 TIMES DAILY
Status: DISCONTINUED | OUTPATIENT
Start: 2023-12-12 | End: 2023-12-14 | Stop reason: HOSPADM

## 2023-12-11 RX ADMIN — PREGABALIN 300 MG: 100 CAPSULE ORAL at 10:09

## 2023-12-11 RX ADMIN — ISOSORBIDE MONONITRATE 60 MG: 30 TABLET, EXTENDED RELEASE ORAL at 10:09

## 2023-12-11 RX ADMIN — HYDRALAZINE HYDROCHLORIDE 75 MG: 50 TABLET, FILM COATED ORAL at 10:09

## 2023-12-11 RX ADMIN — ENOXAPARIN SODIUM 30 MG: 100 INJECTION SUBCUTANEOUS at 10:08

## 2023-12-11 RX ADMIN — ARFORMOTEROL TARTRATE: 15 SOLUTION RESPIRATORY (INHALATION) at 19:31

## 2023-12-11 RX ADMIN — BUMETANIDE 2 MG: 1 TABLET ORAL at 10:08

## 2023-12-11 RX ADMIN — MICONAZOLE NITRATE: 2 POWDER TOPICAL at 20:47

## 2023-12-11 RX ADMIN — VENLAFAXINE HYDROCHLORIDE 150 MG: 150 CAPSULE, EXTENDED RELEASE ORAL at 18:02

## 2023-12-11 RX ADMIN — PANTOPRAZOLE SODIUM 40 MG: 40 TABLET, DELAYED RELEASE ORAL at 05:12

## 2023-12-11 RX ADMIN — ARFORMOTEROL TARTRATE: 15 SOLUTION RESPIRATORY (INHALATION) at 07:58

## 2023-12-11 RX ADMIN — ATORVASTATIN CALCIUM 40 MG: 40 TABLET, FILM COATED ORAL at 20:46

## 2023-12-11 RX ADMIN — CARVEDILOL 25 MG: 12.5 TABLET, FILM COATED ORAL at 10:09

## 2023-12-11 RX ADMIN — MICONAZOLE NITRATE: 2 POWDER TOPICAL at 10:13

## 2023-12-11 RX ADMIN — ENOXAPARIN SODIUM 30 MG: 100 INJECTION SUBCUTANEOUS at 20:46

## 2023-12-11 RX ADMIN — HYDRALAZINE HYDROCHLORIDE 75 MG: 50 TABLET, FILM COATED ORAL at 14:43

## 2023-12-11 RX ADMIN — HYDRALAZINE HYDROCHLORIDE 75 MG: 50 TABLET, FILM COATED ORAL at 20:46

## 2023-12-11 RX ADMIN — CARVEDILOL 25 MG: 12.5 TABLET, FILM COATED ORAL at 18:02

## 2023-12-11 RX ADMIN — VENLAFAXINE HYDROCHLORIDE 150 MG: 150 CAPSULE, EXTENDED RELEASE ORAL at 10:09

## 2023-12-11 ASSESSMENT — PAIN SCALES - GENERAL
PAINLEVEL_OUTOF10: 0
PAINLEVEL_OUTOF10: 0

## 2023-12-11 NOTE — WOUND CARE
Wound Care nurse consult for POA right diabetic foot wound. Chart reviewed and patient assessed. 71 y/o CF admitted for acute hypoxemic respiratory failure. Past Medical History:   Diagnosis Date    Age-related osteoporosis without current pathological fracture     Anxiety and depression 01/22/2018    Arthritis     OA    Asthma     CAD (coronary artery disease)     Chronic systolic heart failure (HCC)     CKD stage G3b/A1, GFR 30-44 and albumin creatinine ratio <30 mg/g (HCC)     Essential hypertension     GERD (gastroesophageal reflux disease)     History of diverticulitis     diverticulitis    History of echocardiogram 11/2021    LV: Estimated LVEF is 40 - 45%. Visually measured ejection fraction. Normal cavity size and wall thickness. Globally reduced systolic function. LA: Moderately dilated left atrium. Left atrial appendage is completely occluded. A Watchman left atrial appendage occluder is present and completely occludes the appendage.     History of migraine     Hypercholesterolemia 01/22/2018    Irritable bowel syndrome     IBS, spinal stenosis    Long-term use of high-risk medication 01/22/2018    Lumbar spinal stenosis     Morbid (severe) obesity due to excess calories (HCC)     Neuropathy     Obesity (BMI 30-39.9) 04/30/2019    Obstructive sleep apnea     uses CPAP    Permanent atrial fibrillation (HCC)     Presence of implantable cardioverter-defibrillator (ICD)     Presence of Watchman left atrial appendage closure device     Type 2 diabetes mellitus with diabetic neuropathy, without long-term current use of insulin ([address]) 06/05/2016    Venous insufficiency     Vitamin B12 deficiency      Past Surgical History:   Procedure Laterality Date    APPENDECTOMY      BREAST BIOPSY Left     about 4-5 years ago from 2022, neg    CHOLECYSTECTOMY  11/15/2018    COLONOSCOPY N/A 3/14/2018    COLONOSCOPY performed by Giuliana Irizarry MD at [facility] ENDOSCOPY    HYSTERECTOMY (CERVIX STATUS UNKNOWN)      IR KYPHOPLASTY

## 2023-12-11 NOTE — CARE COORDINATION
Care Management Initial Assessment       RUR:19%  Readmission? No  1st IM letter given? No  1st  letter given: No     12/11/23 2380   Service Assessment   Patient Orientation Alert and Oriented   Cognition Alert   History Provided By Patient   Primary Ai Atkinson is: Legal Next of Kin   PCP Verified by CM Yes   Last Visit to PCP Within last 3 months   Prior Functional Level Assistance with the following:;Bathing;Dressing; Toileting;Cooking;Housework;Feeding;Mobility; Shopping   Current Functional Level Assistance with the following:;Bathing;Dressing; Toileting;Feeding;Cooking;Housework; Shopping;Mobility   Can patient return to prior living arrangement Yes   Ability to make needs known: Good   Family able to assist with home care needs: Yes   Would you like for me to discuss the discharge plan with any other family members/significant others, and if so, who? Yes  ()   Financial Resources Medicare   Community Resources None   Social/Functional History   Lives With Spouse   Type of [address] Shower/Tub Shower chair with back   [address] chair;Grab bars in [facility] Responsibilities No   Ambulation Assistance Independent   Transfer Assistance Independent   Active  No   Mode of Transportation Family;Car   Occupation Retired   Discharge Planning   Type of [address] [place] [address] Prior To Admission None   Potential Assistance Needed N/A   DME Ordered? No   Potential Assistance Purchasing Medications No   Type of Home Care Services None   Patient expects to be discharged to: House   History of falls?  0   Services At/After Discharge   Transition of Care Consult (CM Consult) N/A   Services At/After Discharge None

## 2023-12-12 LAB
ANION GAP SERPL CALC-SCNC: 4 MMOL/L (ref 5–15)
BUN SERPL-MCNC: 26 MG/DL (ref 6–20)
BUN/CREAT SERPL: 17 (ref 12–20)
CALCIUM SERPL-MCNC: 8.9 MG/DL (ref 8.5–10.1)
CHLORIDE SERPL-SCNC: 108 MMOL/L (ref 97–108)
CO2 SERPL-SCNC: 33 MMOL/L (ref 21–32)
CREAT SERPL-MCNC: 1.56 MG/DL (ref 0.55–1.02)
GLUCOSE SERPL-MCNC: 149 MG/DL (ref 65–100)
POTASSIUM SERPL-SCNC: 3.2 MMOL/L (ref 3.5–5.1)
SODIUM SERPL-SCNC: 145 MMOL/L (ref 136–145)

## 2023-12-12 PROCEDURE — 6360000002 HC RX W HCPCS: Performed by: STUDENT IN AN ORGANIZED HEALTH CARE EDUCATION/TRAINING PROGRAM

## 2023-12-12 PROCEDURE — 6370000000 HC RX 637 (ALT 250 FOR IP): Performed by: INTERNAL MEDICINE

## 2023-12-12 PROCEDURE — 6370000000 HC RX 637 (ALT 250 FOR IP): Performed by: STUDENT IN AN ORGANIZED HEALTH CARE EDUCATION/TRAINING PROGRAM

## 2023-12-12 PROCEDURE — 94761 N-INVAS EAR/PLS OXIMETRY MLT: CPT

## 2023-12-12 PROCEDURE — 36415 COLL VENOUS BLD VENIPUNCTURE: CPT

## 2023-12-12 PROCEDURE — 2700000000 HC OXYGEN THERAPY PER DAY

## 2023-12-12 PROCEDURE — 2580000003 HC RX 258: Performed by: INTERNAL MEDICINE

## 2023-12-12 PROCEDURE — 80048 BASIC METABOLIC PNL TOTAL CA: CPT

## 2023-12-12 PROCEDURE — 2580000003 HC RX 258: Performed by: STUDENT IN AN ORGANIZED HEALTH CARE EDUCATION/TRAINING PROGRAM

## 2023-12-12 PROCEDURE — 94640 AIRWAY INHALATION TREATMENT: CPT

## 2023-12-12 PROCEDURE — 1100000000 HC RM PRIVATE

## 2023-12-12 RX ORDER — 0.9 % SODIUM CHLORIDE 0.9 %
250 INTRAVENOUS SOLUTION INTRAVENOUS ONCE
Status: COMPLETED | OUTPATIENT
Start: 2023-12-12 | End: 2023-12-12

## 2023-12-12 RX ADMIN — HYDRALAZINE HYDROCHLORIDE 75 MG: 50 TABLET, FILM COATED ORAL at 09:07

## 2023-12-12 RX ADMIN — Medication: at 10:30

## 2023-12-12 RX ADMIN — SODIUM CHLORIDE, PRESERVATIVE FREE 10 ML: 5 INJECTION INTRAVENOUS at 22:28

## 2023-12-12 RX ADMIN — CARVEDILOL 25 MG: 12.5 TABLET, FILM COATED ORAL at 18:36

## 2023-12-12 RX ADMIN — ISOSORBIDE MONONITRATE 60 MG: 30 TABLET, EXTENDED RELEASE ORAL at 09:07

## 2023-12-12 RX ADMIN — HYDRALAZINE HYDROCHLORIDE 75 MG: 50 TABLET, FILM COATED ORAL at 22:28

## 2023-12-12 RX ADMIN — ENOXAPARIN SODIUM 30 MG: 100 INJECTION SUBCUTANEOUS at 22:28

## 2023-12-12 RX ADMIN — POTASSIUM BICARBONATE 40 MEQ: 782 TABLET, EFFERVESCENT ORAL at 15:26

## 2023-12-12 RX ADMIN — HYDRALAZINE HYDROCHLORIDE 75 MG: 50 TABLET, FILM COATED ORAL at 15:26

## 2023-12-12 RX ADMIN — MICONAZOLE NITRATE: 2 POWDER TOPICAL at 22:29

## 2023-12-12 RX ADMIN — PREGABALIN 300 MG: 100 CAPSULE ORAL at 09:07

## 2023-12-12 RX ADMIN — ATORVASTATIN CALCIUM 40 MG: 40 TABLET, FILM COATED ORAL at 22:28

## 2023-12-12 RX ADMIN — SODIUM CHLORIDE 250 ML: 9 INJECTION, SOLUTION INTRAVENOUS at 15:28

## 2023-12-12 RX ADMIN — ACETAMINOPHEN 650 MG: 325 TABLET ORAL at 18:39

## 2023-12-12 RX ADMIN — ENOXAPARIN SODIUM 30 MG: 100 INJECTION SUBCUTANEOUS at 09:06

## 2023-12-12 RX ADMIN — CARVEDILOL 25 MG: 12.5 TABLET, FILM COATED ORAL at 09:07

## 2023-12-12 RX ADMIN — VENLAFAXINE HYDROCHLORIDE 150 MG: 150 CAPSULE, EXTENDED RELEASE ORAL at 18:36

## 2023-12-12 RX ADMIN — PANTOPRAZOLE SODIUM 40 MG: 40 TABLET, DELAYED RELEASE ORAL at 05:33

## 2023-12-12 RX ADMIN — BUMETANIDE 2 MG: 1 TABLET ORAL at 09:07

## 2023-12-12 RX ADMIN — ARFORMOTEROL TARTRATE: 15 SOLUTION RESPIRATORY (INHALATION) at 19:28

## 2023-12-12 RX ADMIN — VENLAFAXINE HYDROCHLORIDE 150 MG: 150 CAPSULE, EXTENDED RELEASE ORAL at 09:07

## 2023-12-12 RX ADMIN — MICONAZOLE NITRATE: 2 POWDER TOPICAL at 09:12

## 2023-12-12 RX ADMIN — ARFORMOTEROL TARTRATE: 15 SOLUTION RESPIRATORY (INHALATION) at 07:49

## 2023-12-12 ASSESSMENT — PAIN SCALES - GENERAL: PAINLEVEL_OUTOF10: 0

## 2023-12-12 NOTE — FLOWSHEET NOTE
12/12/23 1400   Vital Signs   Orthostatic B/P and Pulse?  Yes   Blood Pressure Lying 127/69   Pulse Lying 80 PER MINUTE   Blood Pressure Sitting 140/68   Pulse Sitting 80 PER MINUTE   Blood Pressure Standing 108/61   Pulse Standing 92 PER MINUTE     Will notify MD Boxo[place]lementine of + orthostatic BPS

## 2023-12-12 NOTE — PLAN OF CARE
Problem: Discharge Planning  Goal: Discharge to home or other facility with appropriate resources  12/11/2023 1031 by Merle Serrano RN  Outcome: Progressing  12/10/2023 2136 by Paola Matias RN  Outcome: Not Progressing     Problem: Pain  Goal: Verbalizes/displays adequate comfort level or baseline comfort level  12/11/2023 1031 by Merle Serrano RN  Outcome: Progressing  12/10/2023 2136 by Paola Matias RN  Outcome: Progressing     Problem: Skin/Tissue Integrity  Goal: Absence of new skin breakdown  Description: 1. Monitor for areas of redness and/or skin breakdown  2. Assess vascular access sites hourly  3. Every 4-6 hours minimum:  Change oxygen saturation probe site  4. Every 4-6 hours:  If on nasal continuous positive airway pressure, respiratory therapy assess nares and determine need for appliance change or resting period.   12/11/2023 1031 by Merle Serrano RN  Outcome: Progressing  12/10/2023 2136 by Paola Matias RN  Outcome: Not Progressing     Problem: Cognitive:  Goal: Knowledge of wound care  Description: Knowledge of wound care  12/11/2023 1031 by Merle Serrano RN  Outcome: Progressing  12/10/2023 2136 by Paola Matias RN  Outcome: Not Progressing  Goal: Understands risk factors for wounds  Description: Understands risk factors for wounds  12/11/2023 1031 by Merle Serrano RN  Outcome: Progressing  12/10/2023 2136 by Paola Matias RN  Outcome: Progressing     Problem: Wound:  Goal: Will show signs of wound healing; wound closure and no evidence of infection  Description: Will show signs of wound healing; wound closure and no evidence of infection  Outcome: Progressing     Problem: Pressure Ulcer:  Goal: Signs of wound healing will improve  Description: Signs of wound healing will improve  Outcome: Progressing  Goal: Absence of new pressure ulcer  Description: Absence of new pressure ulcer  Outcome: Progressing  Goal: Will show no infection signs and symptoms  Description:
Problem: Pain  Goal: Verbalizes/displays adequate comfort level or baseline comfort level  Outcome: Progressing     Problem: Cognitive:  Goal: Understands risk factors for wounds  Description: Understands risk factors for wounds  Outcome: Progressing     Problem: Falls - Risk of:  Goal: Will remain free from falls  Description: Will remain free from falls  Outcome: Progressing     Problem: Safety - Adult  Goal: Free from fall injury  Outcome: Progressing     Problem: Respiratory - Adult  Goal: Achieves optimal ventilation and oxygenation  12/10/2023 2118 by Regan Minor RCP  Outcome: Progressing  12/10/2023 1057 by Marilyn Gordillo RT  Outcome: Progressing  Flowsheets  Taken 12/10/2023 1057 by RT Liyah  Achieves optimal ventilation and oxygenation:   Assess for changes in respiratory status   Position to facilitate oxygenation and minimize respiratory effort   Respiratory therapy support as indicated   Oxygen supplementation based on oxygen saturation or arterial blood gases   Assess and instruct to report shortness of breath or any respiratory difficulty  Taken 12/10/2023 0819 by Johanny Alarcon LPN  Achieves optimal ventilation and oxygenation: Assess for changes in respiratory status     Problem: Discharge Planning  Goal: Discharge to home or other facility with appropriate resources  Outcome: Not Progressing     Problem: Skin/Tissue Integrity  Goal: Absence of new skin breakdown  Description: 1. Monitor for areas of redness and/or skin breakdown  2. Assess vascular access sites hourly  3. Every 4-6 hours minimum:  Change oxygen saturation probe site  4. Every 4-6 hours:  If on nasal continuous positive airway pressure, respiratory therapy assess nares and determine need for appliance change or resting period.   Outcome: Not Progressing     Problem: Cognitive:  Goal: Knowledge of wound care  Description: Knowledge of wound care  Outcome: Not Progressing
Smoking cessation:  Goal: Ability to formulate a plan to maintain a tobacco-free life will be supported  Description: Ability to formulate a plan to maintain a tobacco-free life will be supported  Outcome: Progressing     Problem: Compression therapy:  Goal: Will be free from complications associated with compression therapy  Description: Will be free from complications associated with compression therapy  Outcome: Progressing     Problem: Weight control:  Goal: Ability to maintain an optimal weight for height and age will be supported  Description: Ability to maintain an optimal weight for height and age will be supported  Outcome: Progressing     Problem: Falls - Risk of:  Goal: Will remain free from falls  Description: Will remain free from falls  Outcome: Progressing     Problem: Blood Glucose:  Goal: Ability to maintain appropriate glucose levels will improve  Description: Ability to maintain appropriate glucose levels will improve  Outcome: Progressing
by Lowell Sinclair RN  Outcome: Progressing  12/11/2023 1031 by Rosalia Pete RN  Outcome: Progressing     Problem: Falls - Risk of:  Goal: Will remain free from falls  Description: Will remain free from falls  12/11/2023 2302 by Lowell Sinclair RN  Outcome: Progressing  12/11/2023 1031 by Rosalia Pete RN  Outcome: Progressing     Problem: Blood Glucose:  Goal: Ability to maintain appropriate glucose levels will improve  Description: Ability to maintain appropriate glucose levels will improve  12/11/2023 2302 by Lowell Sinclair RN  Outcome: Progressing  12/11/2023 1031 by Rosalia Pete RN  Outcome: Progressing     Problem: Safety - Adult  Goal: Free from fall injury  12/11/2023 2302 by Lowell Sinclair RN  Outcome: Progressing  12/11/2023 1031 by Rosalia Pete RN  Outcome: Progressing     Problem: Respiratory - Adult  Goal: Achieves optimal ventilation and oxygenation  12/11/2023 2302 by Lowell Sinclair RN  Outcome: Progressing  12/11/2023 2116 by Oni Campbell RCP  Outcome: Progressing  12/11/2023 1031 by Rosalia Pete RN  Outcome: Progressing     Problem: ABCDS Injury Assessment  Goal: Absence of physical injury  12/11/2023 2302 by Lowell Sinclair RN  Outcome: Progressing  12/11/2023 1031 by Rosalia Pete RN  Outcome: Progressing     Problem: Chronic Conditions and Co-morbidities  Goal: Patient's chronic conditions and co-morbidity symptoms are monitored and maintained or improved  12/11/2023 2302 by Lowell Sinclair RN  Outcome: Progressing  12/11/2023 1031 by Rosalia Pete RN  Outcome: Progressing

## 2023-12-12 NOTE — CARE COORDINATION
Transition of Care Plan:    RUR: 17%  Prior Level of Functioning: independent   Disposition: home with spouse   Follow up appointments: PCP, specialists as indicated   DME needed: home [place] at discharge: spouse   IM/IMM Medicare/ letter given: given  Is patient a  and connected with VA? no   If yes, was [employer] transfer form completed and VA notified? Caregiver Contact: spouse  Discharge Caregiver contacted prior to discharge? To be contacted   Care Conference needed? no  Barriers to discharge:  medical clearance       12/12/23 [facility] Discharge   Transition of Care Consult (CM Consult) Discharge Planning   Services At/After Discharge DME  (home oxygen)   [email or web address] Information Provided? No   Mode of Transport at Discharge Other (see comment)  (spouse)   Confirm Follow Up Transport Family   Condition of Participation: Discharge Planning   The Plan for Transition of Care is related to the following treatment goals: DME   The Patient and/or Patient Representative was provided with a Choice of Provider? Patient   The Patient and/Or Patient Representative agree with the Discharge Plan? Yes   Freedom of Choice list was provided with basic dialogue that supports the patient's individualized plan of care/goals, treatment preferences, and shares the quality data associated with the providers? Yes       Chart reviewed. 2nd IM given. Pt qualifies for home O2- ordered today through [address] accepted and portable tank delivered today at bedside. Pt will return home with spouse once medically cleared- anticipate d/c in AM. Will continue to follow.     CHERYLE Ramirez   Care Manager, [address] [place] [address]

## 2023-12-13 LAB
ANION GAP SERPL CALC-SCNC: 2 MMOL/L (ref 5–15)
BUN SERPL-MCNC: 27 MG/DL (ref 6–20)
BUN/CREAT SERPL: 19 (ref 12–20)
CALCIUM SERPL-MCNC: 9.1 MG/DL (ref 8.5–10.1)
CHLORIDE SERPL-SCNC: 106 MMOL/L (ref 97–108)
CO2 SERPL-SCNC: 35 MMOL/L (ref 21–32)
CREAT SERPL-MCNC: 1.42 MG/DL (ref 0.55–1.02)
GLUCOSE SERPL-MCNC: 129 MG/DL (ref 65–100)
POTASSIUM SERPL-SCNC: 3.2 MMOL/L (ref 3.5–5.1)
SODIUM SERPL-SCNC: 143 MMOL/L (ref 136–145)

## 2023-12-13 PROCEDURE — 94640 AIRWAY INHALATION TREATMENT: CPT

## 2023-12-13 PROCEDURE — 36415 COLL VENOUS BLD VENIPUNCTURE: CPT

## 2023-12-13 PROCEDURE — 6360000002 HC RX W HCPCS: Performed by: STUDENT IN AN ORGANIZED HEALTH CARE EDUCATION/TRAINING PROGRAM

## 2023-12-13 PROCEDURE — 1100000000 HC RM PRIVATE

## 2023-12-13 PROCEDURE — 2580000003 HC RX 258: Performed by: STUDENT IN AN ORGANIZED HEALTH CARE EDUCATION/TRAINING PROGRAM

## 2023-12-13 PROCEDURE — 80048 BASIC METABOLIC PNL TOTAL CA: CPT

## 2023-12-13 PROCEDURE — 94760 N-INVAS EAR/PLS OXIMETRY 1: CPT

## 2023-12-13 PROCEDURE — 94761 N-INVAS EAR/PLS OXIMETRY MLT: CPT

## 2023-12-13 PROCEDURE — 6370000000 HC RX 637 (ALT 250 FOR IP): Performed by: STUDENT IN AN ORGANIZED HEALTH CARE EDUCATION/TRAINING PROGRAM

## 2023-12-13 PROCEDURE — 2700000000 HC OXYGEN THERAPY PER DAY

## 2023-12-13 RX ADMIN — Medication: at 08:47

## 2023-12-13 RX ADMIN — VENLAFAXINE HYDROCHLORIDE 150 MG: 150 CAPSULE, EXTENDED RELEASE ORAL at 17:36

## 2023-12-13 RX ADMIN — CARVEDILOL 25 MG: 12.5 TABLET, FILM COATED ORAL at 08:46

## 2023-12-13 RX ADMIN — ATORVASTATIN CALCIUM 40 MG: 40 TABLET, FILM COATED ORAL at 22:16

## 2023-12-13 RX ADMIN — MELATONIN 3 MG: at 22:16

## 2023-12-13 RX ADMIN — ISOSORBIDE MONONITRATE 60 MG: 30 TABLET, EXTENDED RELEASE ORAL at 08:46

## 2023-12-13 RX ADMIN — CARVEDILOL 25 MG: 12.5 TABLET, FILM COATED ORAL at 17:35

## 2023-12-13 RX ADMIN — CLONAZEPAM 0.5 MG: 0.5 TABLET ORAL at 22:16

## 2023-12-13 RX ADMIN — MICONAZOLE NITRATE: 2 POWDER TOPICAL at 08:45

## 2023-12-13 RX ADMIN — SODIUM CHLORIDE, PRESERVATIVE FREE 10 ML: 5 INJECTION INTRAVENOUS at 08:47

## 2023-12-13 RX ADMIN — HYDRALAZINE HYDROCHLORIDE 75 MG: 50 TABLET, FILM COATED ORAL at 08:46

## 2023-12-13 RX ADMIN — ENOXAPARIN SODIUM 30 MG: 100 INJECTION SUBCUTANEOUS at 22:16

## 2023-12-13 RX ADMIN — SODIUM CHLORIDE, PRESERVATIVE FREE 10 ML: 5 INJECTION INTRAVENOUS at 08:48

## 2023-12-13 RX ADMIN — ARFORMOTEROL TARTRATE: 15 SOLUTION RESPIRATORY (INHALATION) at 07:28

## 2023-12-13 RX ADMIN — VENLAFAXINE HYDROCHLORIDE 150 MG: 150 CAPSULE, EXTENDED RELEASE ORAL at 08:46

## 2023-12-13 RX ADMIN — Medication: at 06:11

## 2023-12-13 RX ADMIN — PANTOPRAZOLE SODIUM 40 MG: 40 TABLET, DELAYED RELEASE ORAL at 06:10

## 2023-12-13 RX ADMIN — PREGABALIN 300 MG: 100 CAPSULE ORAL at 08:46

## 2023-12-13 RX ADMIN — ENOXAPARIN SODIUM 30 MG: 100 INJECTION SUBCUTANEOUS at 08:46

## 2023-12-13 RX ADMIN — Medication: at 22:19

## 2023-12-13 ASSESSMENT — PAIN SCALES - GENERAL: PAINLEVEL_OUTOF10: 0

## 2023-12-13 NOTE — DISCHARGE INSTRUCTIONS
Blood pressure daily. Follow with primary to readjust blood pressure medication.   BMP in 1 week  Daily weight, if gaining 2 pounds more than 2 consecutive days follow-up with primary to adjust diuretics/Bumex dose

## 2023-12-13 NOTE — CARE COORDINATION
12/13/23 1600   Services At/After Discharge   Transition of Care Consult (CM Consult) Discharge [facility] DME   [address] Provided? No   Mode of Transport at Discharge Other (see comment)  (spouse)   Confirm Follow Up Transport Family   Condition of Participation: Discharge Planning   The Plan for Transition of Care is related to the following treatment goals: DME- home oxygen   The Patient and/or Patient Representative was provided with a Choice of Provider? Patient   The Patient and/Or Patient Representative agree with the Discharge Plan? Yes   Freedom of Choice list was provided with basic dialogue that supports the patient's individualized plan of care/goals, treatment preferences, and shares the quality data associated with the providers? Yes       Cleared for d/c from CM standpoint. Home oxygen provided by [employer]- portable tank at bedside. Spouse to transport home. No further CM needs identified.     Gudelia Robles MSW   Care Manager, [facility]

## 2023-12-14 VITALS
RESPIRATION RATE: 19 BRPM | WEIGHT: 239.42 LBS | BODY MASS INDEX: 37.58 KG/M2 | HEART RATE: 79 BPM | TEMPERATURE: 98.4 F | HEIGHT: 67 IN | DIASTOLIC BLOOD PRESSURE: 63 MMHG | SYSTOLIC BLOOD PRESSURE: 123 MMHG | OXYGEN SATURATION: 94 %

## 2023-12-14 PROCEDURE — 6370000000 HC RX 637 (ALT 250 FOR IP): Performed by: STUDENT IN AN ORGANIZED HEALTH CARE EDUCATION/TRAINING PROGRAM

## 2023-12-14 RX ADMIN — PANTOPRAZOLE SODIUM 40 MG: 40 TABLET, DELAYED RELEASE ORAL at 06:33

## 2023-12-14 ASSESSMENT — PAIN SCALES - GENERAL: PAINLEVEL_OUTOF10: 0

## 2023-12-18 ENCOUNTER — HOSPITAL ENCOUNTER (INPATIENT)
Facility: HOSPITAL | Age: 73
LOS: 3 days | Discharge: NURSING, REHAB OR LONG-TERM CARE | DRG: 871 | End: 2023-12-29
Attending: EMERGENCY MEDICINE | Admitting: INTERNAL MEDICINE
Payer: MEDICARE

## 2023-12-18 ENCOUNTER — APPOINTMENT (OUTPATIENT)
Facility: HOSPITAL | Age: 73
DRG: 871 | End: 2023-12-18
Payer: MEDICARE

## 2023-12-18 DIAGNOSIS — G93.40 ACUTE ENCEPHALOPATHY: ICD-10-CM

## 2023-12-18 DIAGNOSIS — R11.2 NAUSEA AND VOMITING, UNSPECIFIED VOMITING TYPE: Primary | ICD-10-CM

## 2023-12-18 DIAGNOSIS — R50.9 FEVER AND CHILLS: ICD-10-CM

## 2023-12-18 DIAGNOSIS — Z95.810 PRESENCE OF COMBINATION INTERNAL CARDIAC DEFIBRILLATOR (ICD) AND PACEMAKER: ICD-10-CM

## 2023-12-18 DIAGNOSIS — I33.0 ACUTE BACTERIAL ENDOCARDITIS: ICD-10-CM

## 2023-12-18 DIAGNOSIS — J96.01 ACUTE HYPOXEMIC RESPIRATORY FAILURE (HCC): ICD-10-CM

## 2023-12-18 DIAGNOSIS — I50.9 ACUTE CONGESTIVE HEART FAILURE, UNSPECIFIED HEART FAILURE TYPE (HCC): ICD-10-CM

## 2023-12-18 DIAGNOSIS — R78.81 STAPHYLOCOCCUS AUREUS BACTEREMIA: ICD-10-CM

## 2023-12-18 DIAGNOSIS — J90 PLEURAL EFFUSION: ICD-10-CM

## 2023-12-18 DIAGNOSIS — B95.61 STAPHYLOCOCCUS AUREUS BACTEREMIA: ICD-10-CM

## 2023-12-18 LAB
ALBUMIN SERPL-MCNC: 3.2 G/DL (ref 3.5–5)
ALBUMIN/GLOB SERPL: 0.9 (ref 1.1–2.2)
ALP SERPL-CCNC: 124 U/L (ref 45–117)
ALT SERPL-CCNC: 18 U/L (ref 12–78)
ANION GAP BLD CALC-SCNC: 9 (ref 10–20)
ANION GAP SERPL CALC-SCNC: 3 MMOL/L (ref 5–15)
AST SERPL-CCNC: 13 U/L (ref 15–37)
BASE EXCESS BLD CALC-SCNC: 4.1 MMOL/L
BASOPHILS # BLD: 0 K/UL (ref 0–0.1)
BASOPHILS NFR BLD: 0 % (ref 0–1)
BILIRUB SERPL-MCNC: 0.7 MG/DL (ref 0.2–1)
BUN SERPL-MCNC: 26 MG/DL (ref 6–20)
BUN/CREAT SERPL: 17 (ref 12–20)
CA-I BLD-MCNC: 1.23 MMOL/L (ref 1.12–1.32)
CALCIUM SERPL-MCNC: 9.3 MG/DL (ref 8.5–10.1)
CHLORIDE BLD-SCNC: 107 MMOL/L (ref 100–108)
CHLORIDE SERPL-SCNC: 109 MMOL/L (ref 97–108)
CO2 BLD-SCNC: 27 MMOL/L (ref 19–24)
CO2 SERPL-SCNC: 30 MMOL/L (ref 21–32)
CREAT SERPL-MCNC: 1.53 MG/DL (ref 0.55–1.02)
CREAT UR-MCNC: 1.4 MG/DL (ref 0.6–1.3)
DIFFERENTIAL METHOD BLD: ABNORMAL
EOSINOPHIL # BLD: 0.1 K/UL (ref 0–0.4)
EOSINOPHIL NFR BLD: 2 % (ref 0–7)
ERYTHROCYTE [DISTWIDTH] IN BLOOD BY AUTOMATED COUNT: 16.3 % (ref 11.5–14.5)
FLUAV AG NPH QL IA: NEGATIVE
FLUBV AG NOSE QL IA: NEGATIVE
GLOBULIN SER CALC-MCNC: 3.4 G/DL (ref 2–4)
GLUCOSE BLD STRIP.AUTO-MCNC: 153 MG/DL (ref 74–106)
GLUCOSE SERPL-MCNC: 162 MG/DL (ref 65–100)
HCO3 BLDA-SCNC: 28 MMOL/L
HCT VFR BLD AUTO: 33.4 % (ref 35–47)
HGB BLD-MCNC: 9.9 G/DL (ref 11.5–16)
IMM GRANULOCYTES # BLD AUTO: 0 K/UL (ref 0–0.04)
IMM GRANULOCYTES NFR BLD AUTO: 0 % (ref 0–0.5)
LACTATE BLD-SCNC: 0.79 MMOL/L (ref 0.4–2)
LIPASE SERPL-CCNC: 24 U/L (ref 13–75)
LYMPHOCYTES # BLD: 0.3 K/UL (ref 0.8–3.5)
LYMPHOCYTES NFR BLD: 6 % (ref 12–49)
MCH RBC QN AUTO: 26.1 PG (ref 26–34)
MCHC RBC AUTO-ENTMCNC: 29.6 G/DL (ref 30–36.5)
MCV RBC AUTO: 88.1 FL (ref 80–99)
MONOCYTES # BLD: 0.3 K/UL (ref 0–1)
MONOCYTES NFR BLD: 5 % (ref 5–13)
NEUTS SEG # BLD: 5 K/UL (ref 1.8–8)
NEUTS SEG NFR BLD: 87 % (ref 32–75)
NRBC # BLD: 0 K/UL (ref 0–0.01)
NRBC BLD-RTO: 0 PER 100 WBC
NT PRO BNP: 3365 PG/ML
PCO2 BLDV: 39.2 MMHG (ref 41–51)
PH BLDV: 7.47 (ref 7.32–7.42)
PLATELET # BLD AUTO: 163 K/UL (ref 150–400)
PMV BLD AUTO: 11.3 FL (ref 8.9–12.9)
PO2 BLDV: 47 MMHG (ref 25–40)
POTASSIUM BLD-SCNC: 4.1 MMOL/L (ref 3.5–5.5)
POTASSIUM SERPL-SCNC: 4.2 MMOL/L (ref 3.5–5.1)
PROCALCITONIN SERPL-MCNC: <0.05 NG/ML
PROT SERPL-MCNC: 6.6 G/DL (ref 6.4–8.2)
RBC # BLD AUTO: 3.79 M/UL (ref 3.8–5.2)
RBC MORPH BLD: ABNORMAL
RSV RNA NPH QL NAA+PROBE: NOT DETECTED
SAO2 % BLD: 85 %
SARS-COV-2 RDRP RESP QL NAA+PROBE: NOT DETECTED
SODIUM BLD-SCNC: 143 MMOL/L (ref 136–145)
SODIUM SERPL-SCNC: 142 MMOL/L (ref 136–145)
SOURCE: NORMAL
SPECIMEN SITE: ABNORMAL
TROPONIN I SERPL HS-MCNC: 47 NG/L (ref 0–51)
WBC # BLD AUTO: 5.7 K/UL (ref 3.6–11)

## 2023-12-18 PROCEDURE — 96375 TX/PRO/DX INJ NEW DRUG ADDON: CPT

## 2023-12-18 PROCEDURE — 84484 ASSAY OF TROPONIN QUANT: CPT

## 2023-12-18 PROCEDURE — 83690 ASSAY OF LIPASE: CPT

## 2023-12-18 PROCEDURE — 80053 COMPREHEN METABOLIC PANEL: CPT

## 2023-12-18 PROCEDURE — 74177 CT ABD & PELVIS W/CONTRAST: CPT

## 2023-12-18 PROCEDURE — 6360000002 HC RX W HCPCS: Performed by: EMERGENCY MEDICINE

## 2023-12-18 PROCEDURE — 96366 THER/PROPH/DIAG IV INF ADDON: CPT

## 2023-12-18 PROCEDURE — 84145 PROCALCITONIN (PCT): CPT

## 2023-12-18 PROCEDURE — 83880 ASSAY OF NATRIURETIC PEPTIDE: CPT

## 2023-12-18 PROCEDURE — 84295 ASSAY OF SERUM SODIUM: CPT

## 2023-12-18 PROCEDURE — 82947 ASSAY GLUCOSE BLOOD QUANT: CPT

## 2023-12-18 PROCEDURE — 87804 INFLUENZA ASSAY W/OPTIC: CPT

## 2023-12-18 PROCEDURE — 36415 COLL VENOUS BLD VENIPUNCTURE: CPT

## 2023-12-18 PROCEDURE — 85025 COMPLETE CBC W/AUTO DIFF WBC: CPT

## 2023-12-18 PROCEDURE — 87077 CULTURE AEROBIC IDENTIFY: CPT

## 2023-12-18 PROCEDURE — 96368 THER/DIAG CONCURRENT INF: CPT

## 2023-12-18 PROCEDURE — 87634 RSV DNA/RNA AMP PROBE: CPT

## 2023-12-18 PROCEDURE — 82330 ASSAY OF CALCIUM: CPT

## 2023-12-18 PROCEDURE — 84132 ASSAY OF SERUM POTASSIUM: CPT

## 2023-12-18 PROCEDURE — 99285 EMERGENCY DEPT VISIT HI MDM: CPT

## 2023-12-18 PROCEDURE — 82803 BLOOD GASES ANY COMBINATION: CPT

## 2023-12-18 PROCEDURE — 87186 SC STD MICRODIL/AGAR DIL: CPT

## 2023-12-18 PROCEDURE — 96365 THER/PROPH/DIAG IV INF INIT: CPT

## 2023-12-18 PROCEDURE — 87040 BLOOD CULTURE FOR BACTERIA: CPT

## 2023-12-18 PROCEDURE — 71045 X-RAY EXAM CHEST 1 VIEW: CPT

## 2023-12-18 PROCEDURE — 6360000004 HC RX CONTRAST MEDICATION: Performed by: EMERGENCY MEDICINE

## 2023-12-18 PROCEDURE — 87154 CUL TYP ID BLD PTHGN 6+ TRGT: CPT

## 2023-12-18 PROCEDURE — 87635 SARS-COV-2 COVID-19 AMP PRB: CPT

## 2023-12-18 RX ORDER — ACETAMINOPHEN 325 MG/1
650 TABLET ORAL EVERY 4 HOURS PRN
Status: DISCONTINUED | OUTPATIENT
Start: 2023-12-18 | End: 2023-12-27 | Stop reason: SDUPTHER

## 2023-12-18 RX ORDER — ACETAMINOPHEN 325 MG/1
650 TABLET ORAL
Status: COMPLETED | OUTPATIENT
Start: 2023-12-18 | End: 2023-12-19

## 2023-12-18 RX ORDER — BUMETANIDE 0.25 MG/ML
1 INJECTION INTRAMUSCULAR; INTRAVENOUS ONCE
Status: COMPLETED | OUTPATIENT
Start: 2023-12-18 | End: 2023-12-19

## 2023-12-18 RX ORDER — ONDANSETRON 2 MG/ML
4 INJECTION INTRAMUSCULAR; INTRAVENOUS ONCE
Status: COMPLETED | OUTPATIENT
Start: 2023-12-18 | End: 2023-12-18

## 2023-12-18 RX ADMIN — IOPAMIDOL 100 ML: 755 INJECTION, SOLUTION INTRAVENOUS at 22:07

## 2023-12-18 RX ADMIN — ONDANSETRON HYDROCHLORIDE 4 MG: 2 INJECTION, SOLUTION INTRAMUSCULAR; INTRAVENOUS at 23:30

## 2023-12-18 ASSESSMENT — LIFESTYLE VARIABLES
HOW MANY STANDARD DRINKS CONTAINING ALCOHOL DO YOU HAVE ON A TYPICAL DAY: PATIENT DOES NOT DRINK
HOW OFTEN DO YOU HAVE A DRINK CONTAINING ALCOHOL: NEVER
HOW OFTEN DO YOU HAVE A DRINK CONTAINING ALCOHOL: NEVER

## 2023-12-18 ASSESSMENT — PAIN - FUNCTIONAL ASSESSMENT: PAIN_FUNCTIONAL_ASSESSMENT: NONE - DENIES PAIN

## 2023-12-19 ENCOUNTER — APPOINTMENT (OUTPATIENT)
Facility: HOSPITAL | Age: 73
DRG: 871 | End: 2023-12-19
Payer: MEDICARE

## 2023-12-19 PROBLEM — R50.9 FEVER AND CHILLS: Status: ACTIVE | Noted: 2023-12-19

## 2023-12-19 LAB
ACCESSION NUMBER, LLC1M: ABNORMAL
ACINETOBACTER CALCOAC BAUMANNII COMPLEX BY PCR: NOT DETECTED
APPEARANCE UR: CLEAR
B FRAGILIS DNA BLD POS QL NAA+NON-PROBE: NOT DETECTED
B PERT DNA SPEC QL NAA+PROBE: NOT DETECTED
BACTERIA URNS QL MICRO: ABNORMAL /HPF
BILIRUB UR QL: NEGATIVE
BIOFIRE TEST COMMENT: ABNORMAL
BORDETELLA PARAPERTUSSIS BY PCR: NOT DETECTED
C ALBICANS DNA BLD POS QL NAA+NON-PROBE: NOT DETECTED
C AURIS DNA BLD POS QL NAA+NON-PROBE: NOT DETECTED
C GATTII+NEOFOR DNA BLD POS QL NAA+N-PRB: NOT DETECTED
C GLABRATA DNA BLD POS QL NAA+NON-PROBE: NOT DETECTED
C KRUSEI DNA BLD POS QL NAA+NON-PROBE: NOT DETECTED
C PARAP DNA BLD POS QL NAA+NON-PROBE: NOT DETECTED
C PNEUM DNA SPEC QL NAA+PROBE: NOT DETECTED
C TROPICLS DNA BLD POS QL NAA+NON-PROBE: NOT DETECTED
COLOR UR: ABNORMAL
E CLOAC COMP DNA BLD POS NAA+NON-PROBE: NOT DETECTED
E COLI DNA BLD POS QL NAA+NON-PROBE: NOT DETECTED
E FAECALIS DNA BLD POS QL NAA+NON-PROBE: NOT DETECTED
E FAECIUM DNA BLD POS QL NAA+NON-PROBE: NOT DETECTED
ECHO BSA: 2.18 M2
ENTEROBACTERALES DNA BLD POS NAA+N-PRB: NOT DETECTED
EPITH CASTS URNS QL MICRO: ABNORMAL /LPF
FLUAV SUBTYP SPEC NAA+PROBE: NOT DETECTED
FLUBV RNA SPEC QL NAA+PROBE: NOT DETECTED
GLUCOSE BLD STRIP.AUTO-MCNC: 118 MG/DL (ref 65–117)
GLUCOSE BLD STRIP.AUTO-MCNC: 132 MG/DL (ref 65–117)
GLUCOSE BLD STRIP.AUTO-MCNC: 184 MG/DL (ref 65–117)
GLUCOSE UR STRIP.AUTO-MCNC: NEGATIVE MG/DL
GP B STREP DNA BLD POS QL NAA+NON-PROBE: NOT DETECTED
HADV DNA SPEC QL NAA+PROBE: NOT DETECTED
HAEM INFLU DNA BLD POS QL NAA+NON-PROBE: NOT DETECTED
HCOV 229E RNA SPEC QL NAA+PROBE: NOT DETECTED
HCOV HKU1 RNA SPEC QL NAA+PROBE: NOT DETECTED
HCOV NL63 RNA SPEC QL NAA+PROBE: NOT DETECTED
HCOV OC43 RNA SPEC QL NAA+PROBE: NOT DETECTED
HGB UR QL STRIP: NEGATIVE
HMPV RNA SPEC QL NAA+PROBE: NOT DETECTED
HPIV1 RNA SPEC QL NAA+PROBE: NOT DETECTED
HPIV2 RNA SPEC QL NAA+PROBE: NOT DETECTED
HPIV3 RNA SPEC QL NAA+PROBE: NOT DETECTED
HPIV4 RNA SPEC QL NAA+PROBE: NOT DETECTED
K OXYTOCA DNA BLD POS QL NAA+NON-PROBE: NOT DETECTED
KETONES UR QL STRIP.AUTO: NEGATIVE MG/DL
KLEBSIELLA SP DNA BLD POS QL NAA+NON-PRB: NOT DETECTED
KLEBSIELLA SP DNA BLD POS QL NAA+NON-PRB: NOT DETECTED
L MONOCYTOG DNA BLD POS QL NAA+NON-PROBE: NOT DETECTED
LEUKOCYTE ESTERASE UR QL STRIP.AUTO: NEGATIVE
M PNEUMO DNA SPEC QL NAA+PROBE: NOT DETECTED
MECA+MECC+MREJ ISLT/SPM QL: DETECTED
N MEN DNA BLD POS QL NAA+NON-PROBE: NOT DETECTED
NITRITE UR QL STRIP.AUTO: NEGATIVE
P AERUGINOSA DNA BLD POS NAA+NON-PROBE: NOT DETECTED
PH UR STRIP: 7 (ref 5–8)
PROT UR STRIP-MCNC: NEGATIVE MG/DL
PROTEUS SP DNA BLD POS QL NAA+NON-PROBE: NOT DETECTED
RBC #/AREA URNS HPF: ABNORMAL /HPF (ref 0–5)
RESISTANT GENE TARGETS: ABNORMAL
RSV RNA SPEC QL NAA+PROBE: NOT DETECTED
RV+EV RNA SPEC QL NAA+PROBE: NOT DETECTED
S AUREUS DNA BLD POS QL NAA+NON-PROBE: DETECTED
S AUREUS+CONS DNA BLD POS NAA+NON-PROBE: DETECTED
S EPIDERMIDIS DNA BLD POS QL NAA+NON-PRB: NOT DETECTED
S LUGDUNENSIS DNA BLD POS QL NAA+NON-PRB: NOT DETECTED
S MALTOPHILIA DNA BLD POS QL NAA+NON-PRB: NOT DETECTED
S MARCESCENS DNA BLD POS NAA+NON-PROBE: NOT DETECTED
S PNEUM DNA BLD POS QL NAA+NON-PROBE: NOT DETECTED
S PYO DNA BLD POS QL NAA+NON-PROBE: NOT DETECTED
SALMONELLA DNA BLD POS QL NAA+NON-PROBE: NOT DETECTED
SARS-COV-2 RNA RESP QL NAA+PROBE: NOT DETECTED
SERVICE CMNT-IMP: ABNORMAL
SP GR UR REFRACTOMETRY: 1.02
STREPTOCOCCUS DNA BLD POS NAA+NON-PROBE: NOT DETECTED
URINE CULTURE IF INDICATED: ABNORMAL
UROBILINOGEN UR QL STRIP.AUTO: 0.2 EU/DL (ref 0.2–1)
WBC URNS QL MICRO: ABNORMAL /HPF (ref 0–4)

## 2023-12-19 PROCEDURE — G0378 HOSPITAL OBSERVATION PER HR: HCPCS

## 2023-12-19 PROCEDURE — 6360000002 HC RX W HCPCS: Performed by: STUDENT IN AN ORGANIZED HEALTH CARE EDUCATION/TRAINING PROGRAM

## 2023-12-19 PROCEDURE — 82962 GLUCOSE BLOOD TEST: CPT

## 2023-12-19 PROCEDURE — 6360000002 HC RX W HCPCS: Performed by: INTERNAL MEDICINE

## 2023-12-19 PROCEDURE — 6370000000 HC RX 637 (ALT 250 FOR IP): Performed by: NURSE PRACTITIONER

## 2023-12-19 PROCEDURE — 6370000000 HC RX 637 (ALT 250 FOR IP): Performed by: INTERNAL MEDICINE

## 2023-12-19 PROCEDURE — 93005 ELECTROCARDIOGRAM TRACING: CPT | Performed by: EMERGENCY MEDICINE

## 2023-12-19 PROCEDURE — 96372 THER/PROPH/DIAG INJ SC/IM: CPT

## 2023-12-19 PROCEDURE — 6360000002 HC RX W HCPCS: Performed by: NURSE PRACTITIONER

## 2023-12-19 PROCEDURE — 96375 TX/PRO/DX INJ NEW DRUG ADDON: CPT

## 2023-12-19 PROCEDURE — 0202U NFCT DS 22 TRGT SARS-COV-2: CPT

## 2023-12-19 PROCEDURE — 97530 THERAPEUTIC ACTIVITIES: CPT

## 2023-12-19 PROCEDURE — 96368 THER/DIAG CONCURRENT INF: CPT

## 2023-12-19 PROCEDURE — 96366 THER/PROPH/DIAG IV INF ADDON: CPT

## 2023-12-19 PROCEDURE — 6370000000 HC RX 637 (ALT 250 FOR IP): Performed by: EMERGENCY MEDICINE

## 2023-12-19 PROCEDURE — 2580000003 HC RX 258: Performed by: NURSE PRACTITIONER

## 2023-12-19 PROCEDURE — 2700000000 HC OXYGEN THERAPY PER DAY

## 2023-12-19 PROCEDURE — 2500000003 HC RX 250 WO HCPCS: Performed by: EMERGENCY MEDICINE

## 2023-12-19 PROCEDURE — 93970 EXTREMITY STUDY: CPT

## 2023-12-19 PROCEDURE — 94640 AIRWAY INHALATION TREATMENT: CPT

## 2023-12-19 PROCEDURE — 96365 THER/PROPH/DIAG IV INF INIT: CPT

## 2023-12-19 PROCEDURE — 97161 PT EVAL LOW COMPLEX 20 MIN: CPT

## 2023-12-19 PROCEDURE — 81001 URINALYSIS AUTO W/SCOPE: CPT

## 2023-12-19 PROCEDURE — 97530 THERAPEUTIC ACTIVITIES: CPT | Performed by: OCCUPATIONAL THERAPIST

## 2023-12-19 PROCEDURE — 97166 OT EVAL MOD COMPLEX 45 MIN: CPT | Performed by: OCCUPATIONAL THERAPIST

## 2023-12-19 RX ORDER — SODIUM CHLORIDE 9 MG/ML
INJECTION, SOLUTION INTRAVENOUS PRN
Status: DISCONTINUED | OUTPATIENT
Start: 2023-12-19 | End: 2023-12-29 | Stop reason: HOSPADM

## 2023-12-19 RX ORDER — LEVOFLOXACIN 5 MG/ML
750 INJECTION, SOLUTION INTRAVENOUS
Status: DISCONTINUED | OUTPATIENT
Start: 2023-12-21 | End: 2023-12-22

## 2023-12-19 RX ORDER — VITAMIN B COMPLEX
1000 TABLET ORAL DAILY
Status: DISCONTINUED | OUTPATIENT
Start: 2023-12-19 | End: 2023-12-29 | Stop reason: HOSPADM

## 2023-12-19 RX ORDER — POTASSIUM CHLORIDE 20 MEQ/1
40 TABLET, EXTENDED RELEASE ORAL PRN
Status: DISCONTINUED | OUTPATIENT
Start: 2023-12-19 | End: 2023-12-29 | Stop reason: HOSPADM

## 2023-12-19 RX ORDER — POLYETHYLENE GLYCOL 3350 17 G/17G
17 POWDER, FOR SOLUTION ORAL DAILY PRN
Status: DISCONTINUED | OUTPATIENT
Start: 2023-12-19 | End: 2023-12-29 | Stop reason: HOSPADM

## 2023-12-19 RX ORDER — INSULIN LISPRO 100 [IU]/ML
0-4 INJECTION, SOLUTION INTRAVENOUS; SUBCUTANEOUS NIGHTLY
Status: DISCONTINUED | OUTPATIENT
Start: 2023-12-19 | End: 2023-12-29 | Stop reason: HOSPADM

## 2023-12-19 RX ORDER — TRIAMCINOLONE ACETONIDE 1 MG/G
CREAM TOPICAL 2 TIMES DAILY PRN
Status: DISCONTINUED | OUTPATIENT
Start: 2023-12-19 | End: 2023-12-29 | Stop reason: HOSPADM

## 2023-12-19 RX ORDER — ACETAMINOPHEN 650 MG/1
650 SUPPOSITORY RECTAL EVERY 6 HOURS PRN
Status: DISCONTINUED | OUTPATIENT
Start: 2023-12-19 | End: 2023-12-29 | Stop reason: HOSPADM

## 2023-12-19 RX ORDER — ALBUTEROL SULFATE 2.5 MG/3ML
2.5 SOLUTION RESPIRATORY (INHALATION) EVERY 4 HOURS PRN
Status: DISCONTINUED | OUTPATIENT
Start: 2023-12-19 | End: 2023-12-29 | Stop reason: HOSPADM

## 2023-12-19 RX ORDER — VENLAFAXINE HYDROCHLORIDE 150 MG/1
150 CAPSULE, EXTENDED RELEASE ORAL 2 TIMES DAILY WITH MEALS
Status: DISCONTINUED | OUTPATIENT
Start: 2023-12-19 | End: 2023-12-29 | Stop reason: HOSPADM

## 2023-12-19 RX ORDER — ARFORMOTEROL TARTRATE 15 UG/2ML
15 SOLUTION RESPIRATORY (INHALATION)
Status: DISCONTINUED | OUTPATIENT
Start: 2023-12-19 | End: 2023-12-29 | Stop reason: HOSPADM

## 2023-12-19 RX ORDER — SODIUM CHLORIDE 0.9 % (FLUSH) 0.9 %
5-40 SYRINGE (ML) INJECTION PRN
Status: DISCONTINUED | OUTPATIENT
Start: 2023-12-19 | End: 2023-12-29 | Stop reason: HOSPADM

## 2023-12-19 RX ORDER — ACETAMINOPHEN 325 MG/1
650 TABLET ORAL EVERY 6 HOURS PRN
Status: DISCONTINUED | OUTPATIENT
Start: 2023-12-19 | End: 2023-12-29 | Stop reason: HOSPADM

## 2023-12-19 RX ORDER — LANOLIN ALCOHOL/MO/W.PET/CERES
3 CREAM (GRAM) TOPICAL NIGHTLY PRN
Status: DISCONTINUED | OUTPATIENT
Start: 2023-12-19 | End: 2023-12-29 | Stop reason: HOSPADM

## 2023-12-19 RX ORDER — CASTOR OIL AND BALSAM, PERU 788; 87 MG/G; MG/G
OINTMENT TOPICAL 2 TIMES DAILY
Status: DISCONTINUED | OUTPATIENT
Start: 2023-12-19 | End: 2023-12-29 | Stop reason: HOSPADM

## 2023-12-19 RX ORDER — BUMETANIDE 1 MG/1
2 TABLET ORAL DAILY
Status: DISCONTINUED | OUTPATIENT
Start: 2023-12-19 | End: 2023-12-25

## 2023-12-19 RX ORDER — ONDANSETRON 2 MG/ML
4 INJECTION INTRAMUSCULAR; INTRAVENOUS EVERY 6 HOURS PRN
Status: DISCONTINUED | OUTPATIENT
Start: 2023-12-19 | End: 2023-12-29 | Stop reason: HOSPADM

## 2023-12-19 RX ORDER — METRONIDAZOLE 500 MG/100ML
500 INJECTION, SOLUTION INTRAVENOUS EVERY 8 HOURS
Status: DISCONTINUED | OUTPATIENT
Start: 2023-12-19 | End: 2023-12-22

## 2023-12-19 RX ORDER — CLOTRIMAZOLE AND BETAMETHASONE DIPROPIONATE 10; .64 MG/G; MG/G
CREAM TOPICAL 2 TIMES DAILY
Status: DISCONTINUED | OUTPATIENT
Start: 2023-12-19 | End: 2023-12-29 | Stop reason: HOSPADM

## 2023-12-19 RX ORDER — LANOLIN ALCOHOL/MO/W.PET/CERES
400 CREAM (GRAM) TOPICAL 2 TIMES DAILY
Status: DISCONTINUED | OUTPATIENT
Start: 2023-12-19 | End: 2023-12-29 | Stop reason: HOSPADM

## 2023-12-19 RX ORDER — DEXTROSE MONOHYDRATE 100 MG/ML
INJECTION, SOLUTION INTRAVENOUS CONTINUOUS PRN
Status: DISCONTINUED | OUTPATIENT
Start: 2023-12-19 | End: 2023-12-29 | Stop reason: HOSPADM

## 2023-12-19 RX ORDER — BUDESONIDE 0.5 MG/2ML
0.5 INHALANT ORAL
Status: DISCONTINUED | OUTPATIENT
Start: 2023-12-19 | End: 2023-12-29 | Stop reason: HOSPADM

## 2023-12-19 RX ORDER — ONDANSETRON 4 MG/1
4 TABLET, ORALLY DISINTEGRATING ORAL EVERY 8 HOURS PRN
Status: DISCONTINUED | OUTPATIENT
Start: 2023-12-19 | End: 2023-12-29 | Stop reason: HOSPADM

## 2023-12-19 RX ORDER — MAGNESIUM SULFATE IN WATER 40 MG/ML
2000 INJECTION, SOLUTION INTRAVENOUS PRN
Status: DISCONTINUED | OUTPATIENT
Start: 2023-12-19 | End: 2023-12-29 | Stop reason: HOSPADM

## 2023-12-19 RX ORDER — ECHINACEA PURPUREA EXTRACT 125 MG
2 TABLET ORAL EVERY 8 HOURS PRN
Status: DISCONTINUED | OUTPATIENT
Start: 2023-12-19 | End: 2023-12-29 | Stop reason: HOSPADM

## 2023-12-19 RX ORDER — POTASSIUM CHLORIDE 20 MEQ/1
10 TABLET, EXTENDED RELEASE ORAL 4 TIMES DAILY
Status: DISCONTINUED | OUTPATIENT
Start: 2023-12-19 | End: 2023-12-29 | Stop reason: HOSPADM

## 2023-12-19 RX ORDER — CARVEDILOL 12.5 MG/1
25 TABLET ORAL 2 TIMES DAILY WITH MEALS
Status: DISCONTINUED | OUTPATIENT
Start: 2023-12-19 | End: 2023-12-29 | Stop reason: HOSPADM

## 2023-12-19 RX ORDER — LEVOFLOXACIN 5 MG/ML
500 INJECTION, SOLUTION INTRAVENOUS
Status: DISCONTINUED | OUTPATIENT
Start: 2023-12-19 | End: 2023-12-19

## 2023-12-19 RX ORDER — CLONAZEPAM 0.5 MG/1
0.5 TABLET ORAL NIGHTLY
Status: DISCONTINUED | OUTPATIENT
Start: 2023-12-19 | End: 2023-12-29 | Stop reason: HOSPADM

## 2023-12-19 RX ORDER — PANTOPRAZOLE SODIUM 40 MG/1
40 TABLET, DELAYED RELEASE ORAL
Status: DISCONTINUED | OUTPATIENT
Start: 2023-12-19 | End: 2023-12-29 | Stop reason: HOSPADM

## 2023-12-19 RX ORDER — INSULIN LISPRO 100 [IU]/ML
0-8 INJECTION, SOLUTION INTRAVENOUS; SUBCUTANEOUS
Status: DISCONTINUED | OUTPATIENT
Start: 2023-12-19 | End: 2023-12-29 | Stop reason: HOSPADM

## 2023-12-19 RX ORDER — IPRATROPIUM BROMIDE AND ALBUTEROL SULFATE 2.5; .5 MG/3ML; MG/3ML
1 SOLUTION RESPIRATORY (INHALATION) EVERY 4 HOURS PRN
Status: DISCONTINUED | OUTPATIENT
Start: 2023-12-19 | End: 2023-12-29 | Stop reason: HOSPADM

## 2023-12-19 RX ORDER — PREGABALIN 100 MG/1
300 CAPSULE ORAL DAILY
Status: DISCONTINUED | OUTPATIENT
Start: 2023-12-19 | End: 2023-12-29 | Stop reason: HOSPADM

## 2023-12-19 RX ORDER — ATORVASTATIN CALCIUM 40 MG/1
40 TABLET, FILM COATED ORAL NIGHTLY
Status: DISCONTINUED | OUTPATIENT
Start: 2023-12-19 | End: 2023-12-29

## 2023-12-19 RX ORDER — ISOSORBIDE MONONITRATE 30 MG/1
60 TABLET, EXTENDED RELEASE ORAL DAILY
Status: DISCONTINUED | OUTPATIENT
Start: 2023-12-19 | End: 2023-12-29 | Stop reason: HOSPADM

## 2023-12-19 RX ORDER — SODIUM CHLORIDE 0.9 % (FLUSH) 0.9 %
5-40 SYRINGE (ML) INJECTION EVERY 12 HOURS SCHEDULED
Status: DISCONTINUED | OUTPATIENT
Start: 2023-12-19 | End: 2023-12-29 | Stop reason: HOSPADM

## 2023-12-19 RX ORDER — ENOXAPARIN SODIUM 100 MG/ML
30 INJECTION SUBCUTANEOUS 2 TIMES DAILY
Status: DISCONTINUED | OUTPATIENT
Start: 2023-12-19 | End: 2023-12-29 | Stop reason: HOSPADM

## 2023-12-19 RX ORDER — POTASSIUM CHLORIDE 7.45 MG/ML
10 INJECTION INTRAVENOUS PRN
Status: DISCONTINUED | OUTPATIENT
Start: 2023-12-19 | End: 2023-12-29 | Stop reason: HOSPADM

## 2023-12-19 RX ADMIN — POTASSIUM CHLORIDE 10 MEQ: 20 TABLET, EXTENDED RELEASE ORAL at 16:51

## 2023-12-19 RX ADMIN — METRONIDAZOLE 500 MG: 500 INJECTION, SOLUTION INTRAVENOUS at 16:49

## 2023-12-19 RX ADMIN — SODIUM CHLORIDE, PRESERVATIVE FREE 10 ML: 5 INJECTION INTRAVENOUS at 21:21

## 2023-12-19 RX ADMIN — VENLAFAXINE HYDROCHLORIDE 150 MG: 150 CAPSULE, EXTENDED RELEASE ORAL at 11:27

## 2023-12-19 RX ADMIN — ACETAMINOPHEN 650 MG: 325 TABLET ORAL at 21:20

## 2023-12-19 RX ADMIN — PANTOPRAZOLE SODIUM 40 MG: 40 TABLET, DELAYED RELEASE ORAL at 09:48

## 2023-12-19 RX ADMIN — POTASSIUM CHLORIDE 10 MEQ: 20 TABLET, EXTENDED RELEASE ORAL at 21:19

## 2023-12-19 RX ADMIN — ATORVASTATIN CALCIUM 40 MG: 40 TABLET, FILM COATED ORAL at 21:20

## 2023-12-19 RX ADMIN — CARVEDILOL 25 MG: 12.5 TABLET, FILM COATED ORAL at 16:51

## 2023-12-19 RX ADMIN — ENOXAPARIN SODIUM 30 MG: 100 INJECTION SUBCUTANEOUS at 21:20

## 2023-12-19 RX ADMIN — CARVEDILOL 25 MG: 12.5 TABLET, FILM COATED ORAL at 08:50

## 2023-12-19 RX ADMIN — BUDESONIDE 500 MCG: 0.5 INHALANT RESPIRATORY (INHALATION) at 20:45

## 2023-12-19 RX ADMIN — BUMETANIDE 2 MG: 1 TABLET ORAL at 08:51

## 2023-12-19 RX ADMIN — BUDESONIDE 500 MCG: 0.5 INHALANT RESPIRATORY (INHALATION) at 08:14

## 2023-12-19 RX ADMIN — VENLAFAXINE HYDROCHLORIDE 150 MG: 150 CAPSULE, EXTENDED RELEASE ORAL at 16:51

## 2023-12-19 RX ADMIN — Medication 400 MG: at 08:51

## 2023-12-19 RX ADMIN — ENOXAPARIN SODIUM 30 MG: 100 INJECTION SUBCUTANEOUS at 08:49

## 2023-12-19 RX ADMIN — BUMETANIDE 1 MG: 0.25 INJECTION, SOLUTION INTRAMUSCULAR; INTRAVENOUS at 00:08

## 2023-12-19 RX ADMIN — POTASSIUM CHLORIDE 10 MEQ: 20 TABLET, EXTENDED RELEASE ORAL at 09:48

## 2023-12-19 RX ADMIN — Medication 1000 UNITS: at 08:51

## 2023-12-19 RX ADMIN — CLOTRIMAZOLE AND BETAMETHASONE DIPROPIONATE: 10; .5 CREAM TOPICAL at 21:20

## 2023-12-19 RX ADMIN — SODIUM CHLORIDE, PRESERVATIVE FREE 10 ML: 5 INJECTION INTRAVENOUS at 08:52

## 2023-12-19 RX ADMIN — MICONAZOLE NITRATE: 20 CREAM TOPICAL at 21:21

## 2023-12-19 RX ADMIN — METRONIDAZOLE 500 MG: 500 INJECTION, SOLUTION INTRAVENOUS at 08:45

## 2023-12-19 RX ADMIN — ISOSORBIDE MONONITRATE 60 MG: 30 TABLET, EXTENDED RELEASE ORAL at 08:49

## 2023-12-19 RX ADMIN — Medication: at 21:24

## 2023-12-19 RX ADMIN — POTASSIUM CHLORIDE 10 MEQ: 20 TABLET, EXTENDED RELEASE ORAL at 14:09

## 2023-12-19 RX ADMIN — CLONAZEPAM 0.5 MG: 0.5 TABLET ORAL at 21:20

## 2023-12-19 RX ADMIN — ARFORMOTEROL TARTRATE 15 MCG: 15 SOLUTION RESPIRATORY (INHALATION) at 20:45

## 2023-12-19 RX ADMIN — LEVOFLOXACIN 750 MG: 5 INJECTION, SOLUTION INTRAVENOUS at 08:41

## 2023-12-19 RX ADMIN — Medication 400 MG: at 21:19

## 2023-12-19 RX ADMIN — ACETAMINOPHEN 650 MG: 325 TABLET ORAL at 00:05

## 2023-12-19 RX ADMIN — PREGABALIN 300 MG: 150 CAPSULE ORAL at 08:50

## 2023-12-19 RX ADMIN — ARFORMOTEROL TARTRATE 15 MCG: 15 SOLUTION RESPIRATORY (INHALATION) at 08:14

## 2023-12-19 RX ADMIN — ACETAMINOPHEN 650 MG: 325 TABLET ORAL at 07:56

## 2023-12-19 NOTE — PROGRESS NOTES
TRANSFER - IN REPORT:    Verbal report received from Geovanny Chong on Jeremiah Castleman  being received from ED for routine progression of patient care      Report consisted of patient's Situation, Background, Assessment and   Recommendations(SBAR). Information from the following report(s) Nurse Handoff Report, Intake/Output, and MAR was reviewed with the receiving nurse. Opportunity for questions and clarification was provided.

## 2023-12-19 NOTE — ED NOTES
TRANSFER - OUT REPORT:    Verbal report given to Tomas Tam RN on Speedy Littlejohn  being transferred to [facility] for routine progression of patient care       Report consisted of patient's Situation, Background, Assessment and   Recommendations(SBAR). Information from the following report(s) Index, ED Encounter Summary, ED SBAR, and MAR was reviewed with the receiving nurse. Austin Fall Assessment:    Presents to emergency department  because of falls (Syncope, seizure, or loss of consciousness): No  Age > 70: Yes  Altered Mental Status, Intoxication with alcohol or substance confusion (Disorientation, impaired judgment, poor safety awaremess, or inability to follow instructions): No  Impaired Mobility: Ambulates or transfers with assistive devices or assistance; Unable to ambulate or transer.: Yes  Nursing Judgement: Yes          Lines:   Peripheral IV 12/18/23 Right;Dorsal Forearm (Active)   Site Assessment Clean, dry & intact 12/18/23 2112   Line Status Brisk blood return;Specimen collected;Normal saline locked; Flushed 12/18/23 2112   Line Care Connections checked and tightened 12/18/23 2112   Phlebitis Assessment No symptoms 12/18/23 2112   Infiltration Assessment 0 12/18/23 2112   Dressing Status New dressing applied 12/18/23 2112   Dressing Type Transparent 12/18/23 2112        Opportunity for questions and clarification was provided.       Patient transported with:  Monitor and Tech

## 2023-12-19 NOTE — ED NOTES
Pt's O2 titrated up to 2. 5LPM by NC for spO2 of 90-91% on 2LPM.     Jayjay Valdez RN  12/19/23 0045

## 2023-12-19 NOTE — ED PROVIDER NOTES
[facility] EMERGENCY DEPT  EMERGENCY DEPARTMENT ENCOUNTER       Pt Name: Maylin Heredia  MRN: [id number]  [address] [facility] 1950  Date of evaluation: 12/18/2023  Provider: Sheron Samayoa DO   PCP: LIZZY Jackson NP  Note Started: 11:39 PM EST 12/18/23     CHIEF COMPLAINT       Chief Complaint   Patient presents with    Altered Mental Status     Patient arrives w EMS for c/o lethargy, acute onset. Patient was released from here last week for SOB. Patient required 2 L of O2 while in route. HISTORY OF PRESENT ILLNESS: 1 or more elements      History From: patient/EMS, History limited by: none     Maylin Heredia is a 72 y.o. female presents to the ED by EMS for generalized weakness, n/v.        Please See MDM for Additional Details of the HPI/PMH  Nursing Notes were all reviewed and agreed with or any disagreements were addressed in the HPI. REVIEW OF SYSTEMS        Positives and Pertinent negatives as per HPI. PAST HISTORY     Past Medical History:  Past Medical History:   Diagnosis Date    Age-related osteoporosis without current pathological fracture     Anxiety and depression 01/22/2018    Arthritis     OA    Asthma     CAD (coronary artery disease)     Chronic systolic heart failure (HCC)     CKD stage G3b/A1, GFR 30-44 and albumin creatinine ratio <30 mg/g (HCC)     Essential hypertension     GERD (gastroesophageal reflux disease)     History of diverticulitis     diverticulitis    History of echocardiogram 11/2021    LV: Estimated LVEF is 40 - 45%. Visually measured ejection fraction. Normal cavity size and wall thickness. Globally reduced systolic function. LA: Moderately dilated left atrium. Left atrial appendage is completely occluded. A Watchman left atrial appendage occluder is present and completely occludes the appendage.     History of migraine     Hypercholesterolemia 01/22/2018    Irritable bowel syndrome     IBS, spinal stenosis    Long-term use of high-risk medication 01/22/2018 splenomegaly.      Trace right-sided pleural effusion.      Nonobstructive nephrolithiasis.      Incidental and/or nonemergent findings are as described above.          XR CHEST PORTABLE   Final Result   No Acute Disease.               PROCEDURES   Unless otherwise noted below, none  Procedures     CRITICAL CARE TIME   None    EMERGENCY DEPARTMENT COURSE and DIFFERENTIAL DIAGNOSIS/MDM   Vitals:    Vitals:    12/18/23 2030 12/18/23 2100 12/18/23 2250 12/18/23 2300   BP: (!) 163/101 (!) 170/126 (!) 156/91    Pulse: 80 80 80 80   Resp: 24 22 16 16   Temp:       SpO2: 92% 93% 91% 92%   Weight:       Height:            Patient was given the following medications:  Medications   ondansetron (ZOFRAN) injection 4 mg (has no administration in time range)   acetaminophen (TYLENOL) tablet 650 mg (has no administration in time range)   bumetanide (BUMEX) injection 1 mg (has no administration in time range)   acetaminophen (TYLENOL) tablet 650 mg (has no administration in time range)   iopamidol (ISOVUE-370) 76 % injection 100 mL (100 mLs IntraVENous Given 12/18/23 2207)       Medical Decision Making  Amount and/or Complexity of Data Reviewed  Labs: ordered.  Radiology: ordered.  ECG/medicine tests: ordered.    Risk  OTC drugs.  Prescription drug management.  Decision regarding hospitalization.    Pt admitted 12/8-12/14 for Acute hypoxic resp failure. Pt with hx of CHF, EF 40-45%, hx of CAD, HTN, HPLD, COPD, CKD, Pt dc home on 2l O2.     Pt presents to the ED for lethargy. Per EMS they were called as pt had been more confused today, Pt arrives c/o nausea and vomiting and mild abd discomfort. She states generalized abd, dull ache, no alleviating or exacerbating factors. She denies any fever or chills. She denies any CP. She states there is mild SOA. She denies any cough or cold symptoms. She denies any headache, loss of sensation or strength.     Unclear etiology of symptoms. Broad work-up. Will obtain sepsis labs, EKG, CXR, CT

## 2023-12-19 NOTE — PROGRESS NOTES
12/19/23 1009 12/19/23 1011 12/19/23 1013   Vital Signs   Pulse  --  84 83   /61 (!) 89/66 112/64  (readjusted cuff)   MAP (Calculated) 83 74 80   BP Location Right upper arm Right upper arm Right upper arm   BP Method Automatic Automatic Automatic   Patient Position Supine Sitting Sitting   Oxygen Therapy   SpO2  --  91 % 91 %   Pulse Oximeter Device Mode  --  Continuous  --    O2 Device  --  Nasal cannula Nasal cannula   O2 Flow Rate (L/min)  --  2.5 L/min 2.5 L/min      12/19/23 1015 12/19/23 1018   Vital Signs   Pulse 83 81   BP (!) 92/53 114/63   MAP (Calculated) 66 80   BP Location Right upper arm Right upper arm   BP Method Automatic Automatic   Patient Position Sitting  (after attempting to get standing BP) Supine  (head of stretcher raised)   Oxygen Therapy   SpO2 90 % 92 %   Pulse Oximeter Device Mode  --   --    O2 Device Nasal cannula Nasal cannula   O2 Flow Rate (L/min) 2.5 L/min 2.5 L/min     Eval complete and note to follow. Recommend SNF rehab at discharge.

## 2023-12-19 NOTE — CARE COORDINATION
Care Management Initial Assessment       RUR: N/A - OBS   Readmission? Yes - 12/8-12/14/23 at [facility] for Acute Hypoxic Respiratory Failure  1st IM letter given? Yes - 12/18/23  1st  letter given: No      Initial note: CM reviewed chart. CM completed assessment with pt and pt's spouse at bedside. CM introduced self, role of CM, verified demographics, and discussed transition of care planning. Pt resides with spouse at home, spouse is main caregiver. Pt has a shower chair, rollator, and home oxygen. Pt's spouse anticipated to transport pt home at time of d/c. CM discussed SNF recs, pt and family at this time would like to coordinate home health through [facility] ([place], [place]) instead of pursue SNF. Care management will continue to be available to assist as transition of care planning needs arise. Full assessment below:       12/19/23 1113   Service Assessment   Patient Orientation Alert and Oriented   Cognition Alert   History Provided By Patient;Spouse   Primary Caregiver Spouse   Accompanied By/Relationship SpouseWesley, and daughter, Shelia   Support Systems Spouse/Significant Other;Children   Patient's Healthcare Decision Maker is: Named in Scanned ACP Document   PCP Verified by CM Yes  (Parish Dumont, EMERALD)   Last Visit to PCP Within last 3 months  (2 weeks ago per pt's spouse)   Prior Functional Level Assistance with the following:;Bathing;Dressing;Toileting;Housework;Shopping;Cooking   Current Functional Level Assistance with the following:;Bathing;Dressing;Toileting;Cooking;Housework;Shopping   Can patient return to prior living arrangement Yes   Ability to make needs known: Good   Family able to assist with home care needs: Yes   Would you like for me to discuss the discharge plan with any other family members/significant others, and if so, who? Yes  (Yes, Spouse Wesley Oakes [phone])   Financial Resources Medicare   Community Resources None   Social/Functional History   Lives With Spouse   Home Layout  One level   Home Access Ramped entrance   Bathroom Shower/Tub Tub/Shower unit   Bathroom Toilet Standard   Bathroom Equipment Grab bars in shower; Shower chair   Home Equipment Rollator;Oxygen  (2L Supplied by [employer])   [address] Help From Family   Active  No   Patient's  Info Spouse   Mode of Transportation Family;Car   Occupation Retired   Discharge Planning   Type of [facility] Prior To Admission None   [address]  (Family reports wanting [address] services coordinated)   Type of [address] None   Patient expects to be discharged to: House   Condition of Participation: Discharge Planning   The Plan for Transition of Care is related to the following treatment goals: Home with continued support of family, anticipate need for home health   The Patient and/or Patient Representative was provided with a Choice of Provider? Patient;Patient Representative   Name of the Patient Representative who was provided with the Choice of Provider and agrees with the Discharge Plan? Spouse, Gris Win   The Patient and/Or Patient Representative agree with the Discharge Plan? Yes   Freedom of Choice list was provided with basic dialogue that supports the patient's individualized plan of care/goals, treatment preferences, and shares the quality data associated with the providers? Yes        12/19/23 1136   Readmission Assessment   Number of Days since last admission? 1-7 days   Previous Disposition Home with Family   Who is being Interviewed Patient;Caregiver   What was the patient's/caregiver's perception as to why they think they needed to return back to the hospital? Other (Comment)  (Vomiting, fever, chills)   Did you visit your Primary Care Physician after you left the hospital, before you returned this time? No   Why weren't you able to visit your PCP?  Other (Comment)  (Seen two weeks ago prior

## 2023-12-19 NOTE — PLAN OF CARE
understanding    Thank you for this referral.  Mercedes Carty OTR/L       Occupational Therapy Evaluation Charge Determination   History Examination Decision-Making   MEDIUM Complexity : Expanded review of history including physical, cognitive and psychocial history  MEDIUM Complexity: 3-5 Performance deficits relating to physical, cognitive, or psychosocial skills that result in activity limitations and/or participation restrictions MEDIUM Complexity: Patient may present with comorbidities that affect occupational performance.  Minimal to moderate modifications of tasks or assist (eg. physical or verbal) with assist is necessary to enable pt to complete eval   Based on the above components, the patient evaluation is determined to be of the following complexity level: Medium

## 2023-12-19 NOTE — H&P
Hospitalist Admission Note    NAME:   Tomeka Penn   : 1950   MRN: [id number]     Date/Time: 2023 2:19 AM    Patient PCP: LIZZY Conley - NP    ______________________________________________________________________  Given the patient's current clinical presentation, I have a high level of concern for decompensation if discharged from the emergency department. Complex decision making was performed, which includes reviewing the patient's available past medical records, laboratory results, and x-ray films. My assessment of this patient's clinical condition and my plan of care is as follows. Assessment / Plan:    Nausea and vomiting mild acute, no longer nauseated, mild fever, resolved s/p tylenol, no acute infectious processes noted on exam  -antiemetics prn  -tylenol prn  -awaiting blood cultures x2- pending done in the ER  -pt doesn't meet SIRS criteria at this time, [facility], await blood cultures    Generalized weakness acute on chronic, likely 2/2 chronic medical conditions, requiring ambulatory device at home, concern for pt  to care for at home, requesting SNF placement  -PT/OT eval and treat  -will place in observation bed, as pt's  doesn't feel safe to care for pt at home, orders as below in prep for possible SNF/rehab as d/w pt.  -, - discharge planning, considering SNF  -up with assistance, turn q2h  -monitor I/O  -continue home CHF meds, doesn't appear exacerbation FVO, presently not requiring increased O2, BNP mildly up from prior check    Recent acute hypoxemic respiratory failure suspected multifactorial- chf EF 40-45% and h/o CAD- required 2lpm o2 at recent discharge 23  -o2 per protocol  -continue home meds    Hypertension, chronic  Hyperlipidemia, chronic  Copd/asthma overlap- requiring 2lpm o2, discharged 23 w/o2  MDD/FELIPA/insomnia, chronic  CKD 3, baseline, baseline cret 2019-present revealed 1.34-2.03.   GERD,

## 2023-12-19 NOTE — PLAN OF CARE
Problem: Skin/Tissue Integrity  Goal: Absence of new skin breakdown  Description: 1. Monitor for areas of redness and/or skin breakdown  2. Assess vascular access sites hourly  3. Every 4-6 hours minimum:  Change oxygen saturation probe site  4. Every 4-6 hours:  If on nasal continuous positive airway pressure, respiratory therapy assess nares and determine need for appliance change or resting period. Outcome: Progressing     Problem: Respiratory - Adult  Goal: Achieves optimal ventilation and oxygenation  12/19/2023 1444 by Elinor Benoit RN  Outcome: Progressing  12/19/2023 1106 by Kaiden Granda, RT  Outcome: Progressing     Problem: Physical Therapy - Adult  Goal: By Discharge: Performs mobility at highest level of function for planned discharge setting. See evaluation for individualized goals. Description: FUNCTIONAL STATUS PRIOR TO ADMISSION: Patient was modified independent using a rollator for functional mobility. and At baseline the patient was on 2.5 liters/min of supplemental O2 continuously. HOME SUPPORT PRIOR TO ADMISSION: The patient lived with spouse but did not require assistance. Physical Therapy Goals  Initiated 12/19/2023  1. Patient will move from supine to sit and sit to supine and roll side to side in bed with contact guard assist within 7 day(s). 2.  Patient will perform sit to stand with contact guard assist within 7 day(s). 3.  Patient will transfer from bed to chair and chair to bed with contact guard assist using the least restrictive device within 7 day(s). 4.  Patient will ambulate with contact guard assist for 30 feet with the least restrictive device within 7 day(s). 12/19/2023 1144 by Miguel Summers, PT  Outcome: Progressing     Problem: Occupational Therapy - Adult  Goal: By Discharge: Performs self-care activities at highest level of function for planned discharge setting. See evaluation for individualized goals.   Description: FUNCTIONAL STATUS PRIOR

## 2023-12-19 NOTE — PROGRESS NOTES
Patient admitted by night team earlier in the day  Patient is coming the hospital chief complaints of nausea, vomiting and abdominal pain. She does not report any diarrhea  She had a fever of 101. No tachycardia. Borderline low blood pressure but currently blood pressure is improved  Creatinine is 1.5, no leukocytosis    Nausea, vomiting and abdominal pain  Generalized weakness/physical deconditioning  Congestive heart failure  COPD/asthma  Hypertension  Dyslipidemia    Plan:  -CT abdomen shows no acute process but shows hepatic steatosis with trace pleural effusion  -Given continued fever, will start empiric antibiotics with levofloxacin and Flagyl. She may have had enteritis. -Due to continued fever, will order further workup. Rapid COVID and also influenza were negative.   Will check a respiratory viral panel.  -Check ultrasound Doppler of the legs given continued fever  -Check CBC and BMP in a.m. tomorrow  -Continue remaining current plan for now

## 2023-12-20 ENCOUNTER — APPOINTMENT (OUTPATIENT)
Facility: HOSPITAL | Age: 73
DRG: 871 | End: 2023-12-20
Payer: MEDICARE

## 2023-12-20 ENCOUNTER — APPOINTMENT (OUTPATIENT)
Facility: HOSPITAL | Age: 73
DRG: 871 | End: 2023-12-20
Attending: INTERNAL MEDICINE
Payer: MEDICARE

## 2023-12-20 LAB
ANION GAP SERPL CALC-SCNC: 4 MMOL/L (ref 5–15)
BASOPHILS # BLD: 0 K/UL (ref 0–0.1)
BASOPHILS NFR BLD: 1 % (ref 0–1)
BUN SERPL-MCNC: 23 MG/DL (ref 6–20)
BUN/CREAT SERPL: 15 (ref 12–20)
CALCIUM SERPL-MCNC: 8.5 MG/DL (ref 8.5–10.1)
CHLORIDE SERPL-SCNC: 110 MMOL/L (ref 97–108)
CO2 SERPL-SCNC: 24 MMOL/L (ref 21–32)
CREAT SERPL-MCNC: 1.54 MG/DL (ref 0.55–1.02)
DIFFERENTIAL METHOD BLD: ABNORMAL
ECHO AV AREA PEAK VELOCITY: 1 CM2
ECHO AV AREA VTI: 0.9 CM2
ECHO AV AREA/BSA VTI: 0.4 CM2/M2
ECHO AV MEAN GRADIENT: 28 MMHG
ECHO AV MEAN VELOCITY: 2.5 M/S
ECHO AV PEAK GRADIENT: 42 MMHG
ECHO AV PEAK GRADIENT: 43 MMHG
ECHO AV PEAK VELOCITY: 3.2 M/S
ECHO AV PEAK VELOCITY: 3.3 M/S
ECHO AV VTI: 55.7 CM
ECHO BSA: 2.18 M2
ECHO LVOT AREA: 3.5 CM2
ECHO LVOT AV VTI INDEX: 0.27
ECHO LVOT DIAM: 2.1 CM
ECHO LVOT MEAN GRADIENT: 2 MMHG
ECHO LVOT PEAK GRADIENT: 4 MMHG
ECHO LVOT PEAK GRADIENT: 4 MMHG
ECHO LVOT PEAK VELOCITY: 1 M/S
ECHO LVOT PEAK VELOCITY: 1 M/S
ECHO LVOT STROKE VOLUME INDEX: 25.1 ML/M2
ECHO LVOT SV: 53 ML
ECHO LVOT VTI: 15.3 CM
ECHO TV REGURGITANT MAX VELOCITY: 2.89 M/S
ECHO TV REGURGITANT PEAK GRADIENT: 33 MMHG
EKG ATRIAL RATE: 80 BPM
EKG DIAGNOSIS: NORMAL
EKG P AXIS: 63 DEGREES
EKG Q-T INTERVAL: 438 MS
EKG QRS DURATION: 142 MS
EKG QTC CALCULATION (BAZETT): 505 MS
EKG R AXIS: 270 DEGREES
EKG T AXIS: 69 DEGREES
EKG VENTRICULAR RATE: 80 BPM
EOSINOPHIL # BLD: 0.2 K/UL (ref 0–0.4)
EOSINOPHIL NFR BLD: 6 % (ref 0–7)
ERYTHROCYTE [DISTWIDTH] IN BLOOD BY AUTOMATED COUNT: 16.8 % (ref 11.5–14.5)
GLUCOSE BLD STRIP.AUTO-MCNC: 105 MG/DL (ref 65–117)
GLUCOSE BLD STRIP.AUTO-MCNC: 152 MG/DL (ref 65–117)
GLUCOSE BLD STRIP.AUTO-MCNC: 183 MG/DL (ref 65–117)
GLUCOSE BLD STRIP.AUTO-MCNC: 213 MG/DL (ref 65–117)
GLUCOSE SERPL-MCNC: 86 MG/DL (ref 65–100)
HCT VFR BLD AUTO: 31 % (ref 35–47)
HGB BLD-MCNC: 8.9 G/DL (ref 11.5–16)
IMM GRANULOCYTES # BLD AUTO: 0 K/UL (ref 0–0.04)
IMM GRANULOCYTES NFR BLD AUTO: 1 % (ref 0–0.5)
LYMPHOCYTES # BLD: 0.5 K/UL (ref 0.8–3.5)
LYMPHOCYTES NFR BLD: 14 % (ref 12–49)
MCH RBC QN AUTO: 25.4 PG (ref 26–34)
MCHC RBC AUTO-ENTMCNC: 28.7 G/DL (ref 30–36.5)
MCV RBC AUTO: 88.3 FL (ref 80–99)
MONOCYTES # BLD: 0.3 K/UL (ref 0–1)
MONOCYTES NFR BLD: 8 % (ref 5–13)
NEUTS SEG # BLD: 2.3 K/UL (ref 1.8–8)
NEUTS SEG NFR BLD: 70 % (ref 32–75)
NRBC # BLD: 0 K/UL (ref 0–0.01)
NRBC BLD-RTO: 0 PER 100 WBC
PLATELET # BLD AUTO: 142 K/UL (ref 150–400)
PMV BLD AUTO: 11.2 FL (ref 8.9–12.9)
POTASSIUM SERPL-SCNC: 4 MMOL/L (ref 3.5–5.1)
RBC # BLD AUTO: 3.51 M/UL (ref 3.8–5.2)
RBC MORPH BLD: ABNORMAL
SERVICE CMNT-IMP: ABNORMAL
SERVICE CMNT-IMP: NORMAL
SODIUM SERPL-SCNC: 138 MMOL/L (ref 136–145)
WBC # BLD AUTO: 3.3 K/UL (ref 3.6–11)

## 2023-12-20 PROCEDURE — 6360000002 HC RX W HCPCS: Performed by: STUDENT IN AN ORGANIZED HEALTH CARE EDUCATION/TRAINING PROGRAM

## 2023-12-20 PROCEDURE — 2580000003 HC RX 258: Performed by: NURSE PRACTITIONER

## 2023-12-20 PROCEDURE — 36415 COLL VENOUS BLD VENIPUNCTURE: CPT

## 2023-12-20 PROCEDURE — G0378 HOSPITAL OBSERVATION PER HR: HCPCS

## 2023-12-20 PROCEDURE — 6360000002 HC RX W HCPCS: Performed by: NURSE PRACTITIONER

## 2023-12-20 PROCEDURE — 6370000000 HC RX 637 (ALT 250 FOR IP): Performed by: INTERNAL MEDICINE

## 2023-12-20 PROCEDURE — 73700 CT LOWER EXTREMITY W/O DYE: CPT

## 2023-12-20 PROCEDURE — 94760 N-INVAS EAR/PLS OXIMETRY 1: CPT

## 2023-12-20 PROCEDURE — 80048 BASIC METABOLIC PNL TOTAL CA: CPT

## 2023-12-20 PROCEDURE — 6370000000 HC RX 637 (ALT 250 FOR IP): Performed by: NURSE PRACTITIONER

## 2023-12-20 PROCEDURE — 87040 BLOOD CULTURE FOR BACTERIA: CPT

## 2023-12-20 PROCEDURE — 85025 COMPLETE CBC W/AUTO DIFF WBC: CPT

## 2023-12-20 PROCEDURE — 93321 DOPPLER ECHO F-UP/LMTD STD: CPT

## 2023-12-20 PROCEDURE — 96366 THER/PROPH/DIAG IV INF ADDON: CPT

## 2023-12-20 PROCEDURE — 94640 AIRWAY INHALATION TREATMENT: CPT

## 2023-12-20 PROCEDURE — 2700000000 HC OXYGEN THERAPY PER DAY

## 2023-12-20 PROCEDURE — 6360000002 HC RX W HCPCS: Performed by: INTERNAL MEDICINE

## 2023-12-20 PROCEDURE — 96367 TX/PROPH/DG ADDL SEQ IV INF: CPT

## 2023-12-20 PROCEDURE — 82962 GLUCOSE BLOOD TEST: CPT

## 2023-12-20 PROCEDURE — 96372 THER/PROPH/DIAG INJ SC/IM: CPT

## 2023-12-20 PROCEDURE — 99223 1ST HOSP IP/OBS HIGH 75: CPT | Performed by: INTERNAL MEDICINE

## 2023-12-20 RX ORDER — LACTOBACILLUS RHAMNOSUS GG 10B CELL
1 CAPSULE ORAL
Status: DISCONTINUED | OUTPATIENT
Start: 2023-12-20 | End: 2023-12-29 | Stop reason: HOSPADM

## 2023-12-20 RX ADMIN — BUDESONIDE 500 MCG: 0.5 INHALANT RESPIRATORY (INHALATION) at 09:09

## 2023-12-20 RX ADMIN — PREGABALIN 300 MG: 150 CAPSULE ORAL at 09:08

## 2023-12-20 RX ADMIN — Medication 2500 MG: at 11:45

## 2023-12-20 RX ADMIN — BUMETANIDE 2 MG: 1 TABLET ORAL at 09:09

## 2023-12-20 RX ADMIN — ENOXAPARIN SODIUM 30 MG: 100 INJECTION SUBCUTANEOUS at 21:11

## 2023-12-20 RX ADMIN — Medication 1 CAPSULE: at 14:28

## 2023-12-20 RX ADMIN — MUPIROCIN: 20 OINTMENT TOPICAL at 14:28

## 2023-12-20 RX ADMIN — SODIUM CHLORIDE, PRESERVATIVE FREE 10 ML: 5 INJECTION INTRAVENOUS at 09:31

## 2023-12-20 RX ADMIN — CLONAZEPAM 0.5 MG: 0.5 TABLET ORAL at 21:12

## 2023-12-20 RX ADMIN — VENLAFAXINE HYDROCHLORIDE 150 MG: 150 CAPSULE, EXTENDED RELEASE ORAL at 17:12

## 2023-12-20 RX ADMIN — Medication 400 MG: at 21:11

## 2023-12-20 RX ADMIN — PANTOPRAZOLE SODIUM 40 MG: 40 TABLET, DELAYED RELEASE ORAL at 09:09

## 2023-12-20 RX ADMIN — ATORVASTATIN CALCIUM 40 MG: 40 TABLET, FILM COATED ORAL at 21:11

## 2023-12-20 RX ADMIN — METRONIDAZOLE 500 MG: 500 INJECTION, SOLUTION INTRAVENOUS at 00:47

## 2023-12-20 RX ADMIN — METRONIDAZOLE 500 MG: 500 INJECTION, SOLUTION INTRAVENOUS at 17:20

## 2023-12-20 RX ADMIN — ARFORMOTEROL TARTRATE 15 MCG: 15 SOLUTION RESPIRATORY (INHALATION) at 09:09

## 2023-12-20 RX ADMIN — CARVEDILOL 25 MG: 12.5 TABLET, FILM COATED ORAL at 09:08

## 2023-12-20 RX ADMIN — CLOTRIMAZOLE AND BETAMETHASONE DIPROPIONATE: 10; .5 CREAM TOPICAL at 21:16

## 2023-12-20 RX ADMIN — ACETAMINOPHEN 650 MG: 325 TABLET ORAL at 05:08

## 2023-12-20 RX ADMIN — ARFORMOTEROL TARTRATE 15 MCG: 15 SOLUTION RESPIRATORY (INHALATION) at 20:10

## 2023-12-20 RX ADMIN — MUPIROCIN: 20 OINTMENT TOPICAL at 21:16

## 2023-12-20 RX ADMIN — VENLAFAXINE HYDROCHLORIDE 150 MG: 150 CAPSULE, EXTENDED RELEASE ORAL at 09:32

## 2023-12-20 RX ADMIN — POTASSIUM CHLORIDE 10 MEQ: 20 TABLET, EXTENDED RELEASE ORAL at 17:12

## 2023-12-20 RX ADMIN — Medication 1000 UNITS: at 09:09

## 2023-12-20 RX ADMIN — MICONAZOLE NITRATE: 20 CREAM TOPICAL at 21:15

## 2023-12-20 RX ADMIN — CARVEDILOL 25 MG: 12.5 TABLET, FILM COATED ORAL at 17:12

## 2023-12-20 RX ADMIN — POTASSIUM CHLORIDE 10 MEQ: 20 TABLET, EXTENDED RELEASE ORAL at 21:12

## 2023-12-20 RX ADMIN — MICONAZOLE NITRATE: 20 CREAM TOPICAL at 09:28

## 2023-12-20 RX ADMIN — ISOSORBIDE MONONITRATE 60 MG: 30 TABLET, EXTENDED RELEASE ORAL at 09:08

## 2023-12-20 RX ADMIN — ENOXAPARIN SODIUM 30 MG: 100 INJECTION SUBCUTANEOUS at 09:10

## 2023-12-20 RX ADMIN — Medication 400 MG: at 09:10

## 2023-12-20 RX ADMIN — BUDESONIDE 500 MCG: 0.5 INHALANT RESPIRATORY (INHALATION) at 20:10

## 2023-12-20 RX ADMIN — POTASSIUM CHLORIDE 10 MEQ: 20 TABLET, EXTENDED RELEASE ORAL at 09:08

## 2023-12-20 RX ADMIN — METRONIDAZOLE 500 MG: 500 INJECTION, SOLUTION INTRAVENOUS at 09:20

## 2023-12-20 RX ADMIN — Medication: at 09:29

## 2023-12-20 RX ADMIN — CLOTRIMAZOLE AND BETAMETHASONE DIPROPIONATE: 10; .5 CREAM TOPICAL at 09:30

## 2023-12-20 RX ADMIN — SODIUM CHLORIDE, PRESERVATIVE FREE 10 ML: 5 INJECTION INTRAVENOUS at 21:21

## 2023-12-20 RX ADMIN — POTASSIUM CHLORIDE 10 MEQ: 20 TABLET, EXTENDED RELEASE ORAL at 13:44

## 2023-12-20 ASSESSMENT — PAIN DESCRIPTION - DESCRIPTORS: DESCRIPTORS: ACHING

## 2023-12-20 ASSESSMENT — PAIN SCALES - GENERAL: PAINLEVEL_OUTOF10: 3

## 2023-12-20 ASSESSMENT — PAIN DESCRIPTION - LOCATION: LOCATION: HEAD;EAR

## 2023-12-20 NOTE — PROGRESS NOTES
End of Shift Note    Bedside shift change report given to Daylin ALARCON  (oncoming nurse) by jR Silva LPN (offgoing nurse). Report included the following information SBAR, Kardex, and MAR    Shift worked:  7a-7p     Shift summary and any significant changes:     Patient is on contact precautions. Patient has a wound to her right foot and wound care did the dressing change. Patient received two runs of metronidazole. wound cultures were ordered, wound care already changed the bandages.   Patient had no complaints of n/v.          Rj Silva LPN

## 2023-12-20 NOTE — PROGRESS NOTES
Hospitalist Progress Note    NAME:   Adriano Trujillo   : 1950   MRN: [id number]     Date/Time: 2023 2:25 PM  Patient PCP: LIZZY Jaeger NP    Estimated discharge date:   Barriers: No blood cultures      Assessment / Plan:    Nausea, vomiting and abdominal pain likely enteritis  -CT abdomen shows no acute process. -LFTs are within normal limits. Lipase is normal.  Total bilirubin is normal.  Troponin is normal  -She reports improvement of symptoms. Continue levofloxacin and Flagyl. She has no diarrhea. Staph aureus bacteremia 2 x 2  Fever  Right foot ulcer  -Blood cultures from  are showing Staph aureus bacteremia in 2 sets. Will repeat blood cultures. Start empiric vancomycin.  -Check CT of right foot due to right foot ulcer. Consult podiatry. Patient cannot have MRI due to pacemaker  -Follow repeat blood cultures  -Check CBC in a.m.  -ID consulted and discussed with ID today. Appreciate recommendations from ID  -Check echocardiogram to rule out endocarditis    Systolic congestive heart failure-continue Bumex, Coreg  Hypertension, chronic  Hyperlipidemia, chronic  Copd/asthma overlap- requiring 2lpm o2, discharged 23 w/o2  MDD/FELIPA/insomnia, chronic  CKD 3, baseline, baseline cret 2019-present revealed 1.34-2.03.   GERD, chronic  Dermatitis, chronic, eczema, and candida pannus  CAD, h/o, presently asymptomatic  T2DM w/neuropathy chronic, w/o insulin use- diet controlled  Mild anemia- slightly improved since last check, asymptomatic, w/o reported bleeding per pt, recent iron profile wnl  LEELA compliant with cpap   -home meds resumed  -nocturnal cpap ordered, may use home cpap if available  -NF substitution Dulera  -amabs prn  -NF substitution protonix      Medical Decision Making:   I personally reviewed labs: CBC, BMP  I personally reviewed imaging: CT abdomen  I personally reviewed EKG:  Toxic drug monitoring: Monitor creatinine while on

## 2023-12-20 NOTE — PROGRESS NOTES
End of Shift Note    Bedside shift change report given to [id number]  (oncoming nurse) by Vy Gonzalez RN (offgoing nurse). Report included the following information SBAR, Kardex, MAR, and Recent Results    Shift worked:  [id number]     Shift summary and any significant changes:     Pt complained of headache and earache this am, prn tylenol given per MAR. Pt took all other medication per MAR.       Concerns for physician to address:       Zone phone for oncoming shift:              Vy Gonzalez RN

## 2023-12-20 NOTE — PLAN OF CARE
Problem: Skin/Tissue Integrity  Goal: Absence of new skin breakdown  Description: 1. Monitor for areas of redness and/or skin breakdown  2. Assess vascular access sites hourly  3. Every 4-6 hours minimum:  Change oxygen saturation probe site  4. Every 4-6 hours:  If on nasal continuous positive airway pressure, respiratory therapy assess nares and determine need for appliance change or resting period. 12/20/2023 0133 by Kim Barnett RN  Outcome: Progressing  12/19/2023 1444 by Nola Collier RN  Outcome: Progressing     Problem: Safety - Adult  Goal: Free from fall injury  Outcome: Progressing     Problem: Occupational Therapy - Adult  Goal: By Discharge: Performs self-care activities at highest level of function for planned discharge setting. See evaluation for individualized goals. Description: FUNCTIONAL STATUS PRIOR TO ADMISSION:  recently started on home O2 2.5L after recent hospital admit, ambulating with rollator walker,  assists pt to step over tub and pt was able to stand and shower on her own, performed all ADLs on her own, was washing dishes,  performed all other IADLS  Receives Help From: Family,  ,  ,  ,  ,  ,  ,  ,  ,  , Active : No     HOME SUPPORT: Patient lived  whom has been struggling to care for pt after recent hospital admit. Occupational Therapy Goals:  Initiated 12/19/2023  1. Patient will perform grooming with Supervision within 7 day(s). 2.  Patient will perform upper body dressing with Supervision within 7 day(s). 3.  Patient will perform lower body dressing with Minimal Assist with AE PRN within 7 day(s). 4.  Patient will perform toilet transfers with Supervision  within 7 day(s). 5.  Patient will perform all aspects of toileting with Minimal Assist within 7 day(s).       12/19/2023 1145 by Loida Irby OTR/L  Outcome: Not Progressing

## 2023-12-20 NOTE — CONSULTS
Seen patient in the room resting   Right foot plantar ulcer has been treated by Dr. Sudhakar Hazel its been there for some time  Vascular right foot adequate with palpable pedal pulses   Forefoot semi rigid cavus with increased forefoot load contributing to the chronicity of the ulcer     Small plantar non infected sub 5th ulcer with good granular bed minimal drainage   No acute care is needed as per Podiatry   Advised patient to continue out patient care with Dr. Sheldon Winchester local wound care as per wound care RN    Can be discharged as per Podiatry     Full note to follow

## 2023-12-20 NOTE — WOUND CARE
Wound Care Consult for the wound on the right 5th metatarsal head plantar surface that was present on admission:  Chart reviewed, patient assessed. Pt. Was admitted for what she says was \"shortness of breath\". Per the physician hx patient was admitted for lethargy, abd pain, N/V and fever. She is currently on oxygen by [facility] [place]. Flu and Covid 19 tests were negative. Pt. Is on MRSA contact precautions for past wound infection and a a nasal test that detected it as well a few months ago. Pt. Goes to Dr. Ahsan Taylor, a local Podiatrist for regular care of this burn wound. Pt. Got the wound from a heating pad she used on the foot for \"foot aching\" from neuropathy. Pt. Is basically insensate with no protective sensation on either foot. Assessment of the wound: Macerated wound edges and immediate periwound skin is white / Fabiene Hue / Arlis Kimi. The wound bed itself is pink and granulation tissue is predominant. No tunneling or undermining. This does not appear to be infected but it is at least colonized with MRSA in   2020. Wound Right foot today: Cleansing it and then  Providing a moist wound dressing has improved  The wound bed is less than 24 hours. Yesterday the wound was crusty:      Treatment: wound cleansed with Vashe and wiped with gauze. Applied Therahoney HD sheet cut to fit the wound over the edges. Foam dressing to cover the wound and then the patient's sock. Plan: Wound care needs to be done every day with assessment of the wound bed documented by nursing. Wound care:  cleanse with NS and wipe with gauze. Apply a piece of Therahoney sheet onto the wound bed and cover with a small foam dressing. Date the dressing daily.      Fercho Guerrero RN, BSN, [employer]

## 2023-12-20 NOTE — CONSULTS
Infectious Disease Consult    Date of Consultation:  December 20, 2023  Reason for Consultation:staph aureus bacteremia unclear source   Referring Physician: Dr Wen Echavarria  Date of Admission:12/18/2023      Impression    Staphylococcal bacteremia  Blood cultures 12/18+ for probable MSSA 4/4-RFA/ [place]  Mec a / mre j genes positive on [facility] ID panel concerning  For MRSA  Repeat cultures-pending  Patient for echo/JUAN MANUEL. Right foot plantar ulcer,   Heating pad injury, ongoing for  past 5 months  For imaging, wound culture  Podiatry evaluation. Acute hypoxic respiratory failure  2ry to CHF  Exacerbation of COPD. Pacemaker, defibrillator/Watchman present  Patient will require JUAN MANUEL    Diabetes type 2  Diabetic neuropathy  A1c 5.2    Nausea, vomiting, abdominal pain  Resolved    CKD 3  Cr 1.54    MDD/FELIPA  Continue home meds    Obesity  BMI 34.76    Plan  Continue Vancomycin IV  Pharmacy to dose per creatinine clearance please  D/w micro lab-MRSA on [facility] ID panel  Final ID LILIANA is pending  Repeat blood cultures x 2   until negative cultures, obtained  CT/MRI of R/foot, culture R/foot plantar wound  MRSA decolonization- nasal mupirocin, hibiclens wipes  Patient will require echo/JUAN MANUEL evaluate cardiac defibrillator/watchman. D/w Dr Wen Echavarria    Extensive review of chart notes, labs, imaging, cultures done  Additionally review of done: Recent reports-Labs, cultures, imaging  D/w -hospitalist, AGNES    Valerio Lisy is a 72 y.o.  female with PMHx significant for CHF EF 40-45%, CAD, hypertension, hyperlipidemia, COPD/asthma overlap, LEELA compliant w/CPAP, CKD 3, baseline, MDD/FELIPA/insomnia, GERD. Of note patient is status post recent admission 12/8 to 12/14. She was treated for acute hypoxic respiratory failure, CHF, CKD 3, hypertension. Patient had reported  vomiting after nausea, with generalized abdominal pain, and increased generalized weakness. No history of diarrhea.  She was noted to have a mild temp of 100.8 in ER, which was resolved discharge, swelling, lumps in neck, sores on tongue   CV:  Negative for chest pain, dyspnea on exertion, leg edema   Lungs: Negative for shortness of breath, cough, wheezing   Abdomen: Negative for abdominal pain, nausea, vomiting, diarrhea, constipation   Genitourinary: Negative for genital pain or genital discharge     Neuro: Negative for headache, numbness, tingling, extremity weakness,  syncope, seizures    Skin: Negative for rash, sores/open wounds   Musculoskeletal: Negative for joint pain, joint swelling, joint erythema    Endocrine: Negative for high or low blood sugars, heat or cold intolerance    Psych: Negative for manic behavior     Vitals:    12/20/23 0914   BP: 117/82   Pulse: 80   Resp: 16   Temp:    SpO2: 98%           PHYSICAL EXAM:  General:          WD, WN. Alert, cooperative, no acute distress    EENT:              EOMI. Anicteric sclerae. MMM  Resp:               CTA bilaterally, no wheezing or rales.  No accessory muscle use  CV:                  Regular  rhythm,  No edema  GI:                   Soft, Non distended, Non tender.  +Bowel sounds  Neurologic:      Alert and oriented X 3, normal speech,   Psych:             Good insight. Not anxious nor agitated  Skin:                No rashes.  No jaundice.  Extremities: No edema, R/foot dressing+  Podiatry photo and note reviewed        Recent Results (from the past 24 hour(s))   Respiratory Panel, Molecular, with COVID-19 (Restricted: peds pts or suitable admitted adults)    Collection Time: 12/19/23 11:24 AM    Specimen: Nasopharyngeal   Result Value Ref Range    Adenovirus by PCR Not detected NOTD      Coronavirus 229E by PCR Not detected NOTD      Coronavirus HKU1 by PCR Not detected NOTD      Coronavirus NL63 by PCR Not detected NOTD      Coronavirus OC43 by PCR Not detected NOTD      SARS-CoV-2, PCR Not detected NOTD      Human Metapneumovirus by PCR Not detected NOTD      Rhinovirus Enterovirus PCR Not detected NOTD      Influenza A by PCR

## 2023-12-21 ENCOUNTER — APPOINTMENT (OUTPATIENT)
Facility: HOSPITAL | Age: 73
DRG: 871 | End: 2023-12-21
Payer: MEDICARE

## 2023-12-21 PROBLEM — N18.31 STAGE 3A CHRONIC KIDNEY DISEASE (HCC): Status: ACTIVE | Noted: 2018-11-13

## 2023-12-21 PROBLEM — J44.1 ACUTE EXACERBATION OF COPD WITH ASTHMA (HCC): Status: ACTIVE | Noted: 2019-08-17

## 2023-12-21 PROBLEM — L97.515 ULCER OF RIGHT FOOT WITH MUSCLE INVOLVEMENT WITHOUT EVIDENCE OF NECROSIS (HCC): Status: ACTIVE | Noted: 2023-12-21

## 2023-12-21 PROBLEM — R11.2 NAUSEA AND VOMITING: Status: ACTIVE | Noted: 2023-12-21

## 2023-12-21 PROBLEM — R78.81 STAPHYLOCOCCUS AUREUS BACTEREMIA: Status: ACTIVE | Noted: 2023-12-21

## 2023-12-21 PROBLEM — B95.61 STAPHYLOCOCCUS AUREUS BACTEREMIA: Status: ACTIVE | Noted: 2023-12-21

## 2023-12-21 PROBLEM — E11.42 DIABETIC POLYNEUROPATHY ASSOCIATED WITH TYPE 2 DIABETES MELLITUS (HCC): Status: ACTIVE | Noted: 2023-12-21

## 2023-12-21 PROBLEM — J45.901 ACUTE EXACERBATION OF COPD WITH ASTHMA (HCC): Status: ACTIVE | Noted: 2019-08-17

## 2023-12-21 PROBLEM — E11.65 POORLY CONTROLLED DIABETES MELLITUS (HCC): Status: ACTIVE | Noted: 2023-12-21

## 2023-12-21 PROBLEM — Z95.810 PRESENCE OF COMBINATION INTERNAL CARDIAC DEFIBRILLATOR (ICD) AND PACEMAKER: Status: ACTIVE | Noted: 2023-12-21

## 2023-12-21 LAB
ANION GAP SERPL CALC-SCNC: 4 MMOL/L (ref 5–15)
BACTERIA SPEC CULT: ABNORMAL
BASOPHILS # BLD: 0 K/UL (ref 0–0.1)
BASOPHILS NFR BLD: 0 % (ref 0–1)
BUN SERPL-MCNC: 25 MG/DL (ref 6–20)
BUN/CREAT SERPL: 16 (ref 12–20)
CALCIUM SERPL-MCNC: 8.6 MG/DL (ref 8.5–10.1)
CHLORIDE SERPL-SCNC: 108 MMOL/L (ref 97–108)
CO2 SERPL-SCNC: 30 MMOL/L (ref 21–32)
CREAT SERPL-MCNC: 1.53 MG/DL (ref 0.55–1.02)
DIFFERENTIAL METHOD BLD: ABNORMAL
EOSINOPHIL # BLD: 0.3 K/UL (ref 0–0.4)
EOSINOPHIL NFR BLD: 8 % (ref 0–7)
ERYTHROCYTE [DISTWIDTH] IN BLOOD BY AUTOMATED COUNT: 16.5 % (ref 11.5–14.5)
GLUCOSE BLD STRIP.AUTO-MCNC: 118 MG/DL (ref 65–117)
GLUCOSE BLD STRIP.AUTO-MCNC: 124 MG/DL (ref 65–117)
GLUCOSE BLD STRIP.AUTO-MCNC: 170 MG/DL (ref 65–117)
GLUCOSE BLD STRIP.AUTO-MCNC: 262 MG/DL (ref 65–117)
GLUCOSE SERPL-MCNC: 117 MG/DL (ref 65–100)
HCT VFR BLD AUTO: 31.1 % (ref 35–47)
HGB BLD-MCNC: 9.1 G/DL (ref 11.5–16)
IMM GRANULOCYTES # BLD AUTO: 0 K/UL (ref 0–0.04)
IMM GRANULOCYTES NFR BLD AUTO: 0 % (ref 0–0.5)
LYMPHOCYTES # BLD: 0.6 K/UL (ref 0.8–3.5)
LYMPHOCYTES NFR BLD: 15 % (ref 12–49)
MCH RBC QN AUTO: 25.2 PG (ref 26–34)
MCHC RBC AUTO-ENTMCNC: 29.3 G/DL (ref 30–36.5)
MCV RBC AUTO: 86.1 FL (ref 80–99)
MONOCYTES # BLD: 0.3 K/UL (ref 0–1)
MONOCYTES NFR BLD: 8 % (ref 5–13)
NEUTS SEG # BLD: 2.8 K/UL (ref 1.8–8)
NEUTS SEG NFR BLD: 69 % (ref 32–75)
NRBC # BLD: 0 K/UL (ref 0–0.01)
NRBC BLD-RTO: 0 PER 100 WBC
PLATELET # BLD AUTO: 144 K/UL (ref 150–400)
PMV BLD AUTO: 11.2 FL (ref 8.9–12.9)
POTASSIUM SERPL-SCNC: 3.7 MMOL/L (ref 3.5–5.1)
RBC # BLD AUTO: 3.61 M/UL (ref 3.8–5.2)
RBC MORPH BLD: ABNORMAL
SERVICE CMNT-IMP: ABNORMAL
SODIUM SERPL-SCNC: 142 MMOL/L (ref 136–145)
WBC # BLD AUTO: 4 K/UL (ref 3.6–11)

## 2023-12-21 PROCEDURE — 6370000000 HC RX 637 (ALT 250 FOR IP): Performed by: NURSE PRACTITIONER

## 2023-12-21 PROCEDURE — 82962 GLUCOSE BLOOD TEST: CPT

## 2023-12-21 PROCEDURE — 6370000000 HC RX 637 (ALT 250 FOR IP): Performed by: INTERNAL MEDICINE

## 2023-12-21 PROCEDURE — G0378 HOSPITAL OBSERVATION PER HR: HCPCS

## 2023-12-21 PROCEDURE — 96372 THER/PROPH/DIAG INJ SC/IM: CPT

## 2023-12-21 PROCEDURE — 97535 SELF CARE MNGMENT TRAINING: CPT

## 2023-12-21 PROCEDURE — 73620 X-RAY EXAM OF FOOT: CPT

## 2023-12-21 PROCEDURE — 85025 COMPLETE CBC W/AUTO DIFF WBC: CPT

## 2023-12-21 PROCEDURE — 94640 AIRWAY INHALATION TREATMENT: CPT

## 2023-12-21 PROCEDURE — 6360000002 HC RX W HCPCS: Performed by: INTERNAL MEDICINE

## 2023-12-21 PROCEDURE — 2580000003 HC RX 258: Performed by: NURSE PRACTITIONER

## 2023-12-21 PROCEDURE — 6360000002 HC RX W HCPCS: Performed by: NURSE PRACTITIONER

## 2023-12-21 PROCEDURE — 80048 BASIC METABOLIC PNL TOTAL CA: CPT

## 2023-12-21 PROCEDURE — 99233 SBSQ HOSP IP/OBS HIGH 50: CPT | Performed by: INTERNAL MEDICINE

## 2023-12-21 PROCEDURE — 2700000000 HC OXYGEN THERAPY PER DAY

## 2023-12-21 PROCEDURE — 6360000002 HC RX W HCPCS: Performed by: STUDENT IN AN ORGANIZED HEALTH CARE EDUCATION/TRAINING PROGRAM

## 2023-12-21 PROCEDURE — 94761 N-INVAS EAR/PLS OXIMETRY MLT: CPT

## 2023-12-21 PROCEDURE — 96366 THER/PROPH/DIAG IV INF ADDON: CPT

## 2023-12-21 PROCEDURE — 36415 COLL VENOUS BLD VENIPUNCTURE: CPT

## 2023-12-21 PROCEDURE — 2580000003 HC RX 258: Performed by: INTERNAL MEDICINE

## 2023-12-21 RX ADMIN — POTASSIUM CHLORIDE 10 MEQ: 20 TABLET, EXTENDED RELEASE ORAL at 11:55

## 2023-12-21 RX ADMIN — VENLAFAXINE HYDROCHLORIDE 150 MG: 150 CAPSULE, EXTENDED RELEASE ORAL at 08:41

## 2023-12-21 RX ADMIN — CARVEDILOL 25 MG: 12.5 TABLET, FILM COATED ORAL at 16:45

## 2023-12-21 RX ADMIN — ISOSORBIDE MONONITRATE 60 MG: 30 TABLET, EXTENDED RELEASE ORAL at 08:42

## 2023-12-21 RX ADMIN — ARFORMOTEROL TARTRATE 15 MCG: 15 SOLUTION RESPIRATORY (INHALATION) at 20:10

## 2023-12-21 RX ADMIN — INSULIN LISPRO 4 UNITS: 100 INJECTION, SOLUTION INTRAVENOUS; SUBCUTANEOUS at 11:55

## 2023-12-21 RX ADMIN — VENLAFAXINE HYDROCHLORIDE 150 MG: 150 CAPSULE, EXTENDED RELEASE ORAL at 16:45

## 2023-12-21 RX ADMIN — CLOTRIMAZOLE AND BETAMETHASONE DIPROPIONATE: 10; .5 CREAM TOPICAL at 08:39

## 2023-12-21 RX ADMIN — SODIUM CHLORIDE: 9 INJECTION, SOLUTION INTRAVENOUS at 08:47

## 2023-12-21 RX ADMIN — Medication 400 MG: at 21:45

## 2023-12-21 RX ADMIN — SODIUM CHLORIDE, PRESERVATIVE FREE 10 ML: 5 INJECTION INTRAVENOUS at 08:41

## 2023-12-21 RX ADMIN — Medication 1000 UNITS: at 08:41

## 2023-12-21 RX ADMIN — ARFORMOTEROL TARTRATE 15 MCG: 15 SOLUTION RESPIRATORY (INHALATION) at 09:02

## 2023-12-21 RX ADMIN — POTASSIUM CHLORIDE 10 MEQ: 20 TABLET, EXTENDED RELEASE ORAL at 16:36

## 2023-12-21 RX ADMIN — SODIUM CHLORIDE: 9 INJECTION, SOLUTION INTRAVENOUS at 17:15

## 2023-12-21 RX ADMIN — POTASSIUM CHLORIDE 10 MEQ: 20 TABLET, EXTENDED RELEASE ORAL at 21:44

## 2023-12-21 RX ADMIN — MELATONIN 3 MG: at 21:45

## 2023-12-21 RX ADMIN — Medication 400 MG: at 08:41

## 2023-12-21 RX ADMIN — SODIUM CHLORIDE: 9 INJECTION, SOLUTION INTRAVENOUS at 12:02

## 2023-12-21 RX ADMIN — POTASSIUM CHLORIDE 10 MEQ: 20 TABLET, EXTENDED RELEASE ORAL at 08:42

## 2023-12-21 RX ADMIN — CARVEDILOL 25 MG: 12.5 TABLET, FILM COATED ORAL at 08:41

## 2023-12-21 RX ADMIN — MUPIROCIN: 20 OINTMENT TOPICAL at 08:39

## 2023-12-21 RX ADMIN — BUDESONIDE 500 MCG: 0.5 INHALANT RESPIRATORY (INHALATION) at 20:10

## 2023-12-21 RX ADMIN — Medication 1 CAPSULE: at 11:55

## 2023-12-21 RX ADMIN — Medication: at 08:40

## 2023-12-21 RX ADMIN — METRONIDAZOLE 500 MG: 500 INJECTION, SOLUTION INTRAVENOUS at 08:48

## 2023-12-21 RX ADMIN — BUDESONIDE 500 MCG: 0.5 INHALANT RESPIRATORY (INHALATION) at 09:02

## 2023-12-21 RX ADMIN — MICONAZOLE NITRATE: 20 CREAM TOPICAL at 08:39

## 2023-12-21 RX ADMIN — LEVOFLOXACIN 750 MG: 5 INJECTION, SOLUTION INTRAVENOUS at 14:54

## 2023-12-21 RX ADMIN — ENOXAPARIN SODIUM 30 MG: 100 INJECTION SUBCUTANEOUS at 21:45

## 2023-12-21 RX ADMIN — VANCOMYCIN HYDROCHLORIDE 1000 MG: 1 INJECTION, POWDER, LYOPHILIZED, FOR SOLUTION INTRAVENOUS at 12:02

## 2023-12-21 RX ADMIN — BUMETANIDE 2 MG: 1 TABLET ORAL at 08:41

## 2023-12-21 RX ADMIN — PANTOPRAZOLE SODIUM 40 MG: 40 TABLET, DELAYED RELEASE ORAL at 06:13

## 2023-12-21 RX ADMIN — ATORVASTATIN CALCIUM 40 MG: 40 TABLET, FILM COATED ORAL at 21:45

## 2023-12-21 RX ADMIN — METRONIDAZOLE 500 MG: 500 INJECTION, SOLUTION INTRAVENOUS at 01:42

## 2023-12-21 RX ADMIN — ENOXAPARIN SODIUM 30 MG: 100 INJECTION SUBCUTANEOUS at 08:42

## 2023-12-21 RX ADMIN — PREGABALIN 300 MG: 150 CAPSULE ORAL at 08:41

## 2023-12-21 RX ADMIN — METRONIDAZOLE 500 MG: 500 INJECTION, SOLUTION INTRAVENOUS at 17:16

## 2023-12-21 RX ADMIN — SODIUM CHLORIDE: 9 INJECTION, SOLUTION INTRAVENOUS at 14:53

## 2023-12-21 RX ADMIN — CLONAZEPAM 0.5 MG: 0.5 TABLET ORAL at 21:45

## 2023-12-21 NOTE — PROGRESS NOTES
suitable admitted adults) [[id number]] Collected: 12/19/23 1106    Order Status: Canceled Specimen: Nasopharyngeal     Blood Culture 2 [[id number]]  (Abnormal) Collected: 12/18/23 2130    Order Status: Completed Specimen: Blood Updated: 12/21/23 0926     Special Requests --        LEFT  Antecubital       Culture       * Methicillin Resistant Staphylococcus aureus * GROWING IN BOTH BOTTLES DRAWN SITE= LA REFER TO [id number] FOR SENSITIVITIES          Blood Culture 1 [[id number]]  (Abnormal)  (Susceptibility) Collected: 12/18/23 2115    Order Status: Completed Specimen: Blood Updated: 12/21/23 0922     Special Requests --        RIGHT  FOREARM       Culture       * Methicillin Resistant Staphylococcus aureus * GROWING IN BOTH BOTTLES DRAWN SITE = RIGHT FOREARM            (NOTE) GRAM POSITIVE [facility] IN [facility] CALLED AND READ BACK BY CORKY PRESTON RN AT 2304 BY   NO CALL  MRSA- KNOWN MRSA PATIENT    Susceptibility        Methicillin-Resistant Staphylococcus aureus      BACTERIAL SUSCEPTIBILITY PANEL LILIANA      ciprofloxacin >=8 ug/mL Resistant      clindamycin >=4 ug/mL Resistant      DAPTOmycin 0.25 ug/mL Sensitive      doxycycline 1 ug/mL Sensitive      erythromycin >=8 ug/mL Resistant      gentamicin <=0.5 ug/mL Sensitive      levofloxacin >=8 ug/mL Resistant      linezolid 2 ug/mL Sensitive      oxacillin >=4 ug/mL Resistant      rifampin <=0.5 ug/mL Sensitive  [1]       tetracycline <=1 ug/mL Sensitive      trimethoprim-sulfamethoxazole <=10 ug/mL Sensitive      vancomycin 1 ug/mL Sensitive                   [1]  Rifampin is not to be used for mono-therapy.                    Culture, Blood, PCR ID Panel [[id number]]  (Abnormal) Collected: 12/18/23 2115    Order Status: Completed Specimen: Blood Updated: 12/19/23 2308     Accession Number NEYMAR [id number]     Enterococcus faecalis by PCR Not detected        Enterococcus faecium by PCR Not detected        Listeria monocytogenes by PCR Not detected        STAPHYLOCOCCUS the novel coronavirus associated with COVID-19.     Negative results should be treated as presumptive and, if inconsistent with clinical signs and symptoms or necessary for patient management, should be tested with an alternative molecular assay.  Negative results do not preclude SARS-CoV-2 infection and should not be used as the sole basis for patient management decisions.     This test has been authorized by the FDA under an Emergency Use Authorization (EUA) for use by authorized laboratories.   Fact sheet for Healthcare Providers:  https://www.fda.gov/media/566807/download  Fact sheet for Patients: https://www.fda.gov/media/945353/download     Methodology: Isothermal Nucleic Acid Amplification                 Xray Result (most recent):  XR FOOT RIGHT (2 VIEWS) 12/21/2023    Narrative  EXAM: XR FOOT RIGHT (2 VIEWS)    INDICATION: plantar ulcer evaluate for bone destruction.    COMPARISON: None.    FINDINGS: Two views of the right foot demonstrate no fracture or other acute  osseous or articular abnormality. The soft tissues are within normal limits.    Impression  No acute abnormality. No evidence of osteomyelitis. No soft tissue  gas or foreign body.      Vascular duplex lower extremity venous bilateral    Result Date: 12/19/2023    No evidence of deep vein or superficial vein thrombosis in the right lower extremity. Vessels demonstrate normal compressibility, color filling, and phasic and spontaneous flow.   No evidence of deep vein or superficial vein thrombosis in the left lower extremity. Vessels demonstrate normal compressibility, color filling, and phasic and spontaneous flow.     CT ABDOMEN PELVIS W IV CONTRAST Additional Contrast? None    Result Date: 12/18/2023  CLINICAL HISTORY: n/v, abd pain INDICATION: n/v, abd pain COMPARISON: None. CONTRAST: 100  ml Isovue 370 TECHNIQUE: Multislice helical CT was performed of the abdomen and pelvis following uneventful rapid bolus intravenous contrast

## 2023-12-21 NOTE — PLAN OF CARE
Problem: Occupational Therapy - Adult  Goal: By Discharge: Performs self-care activities at highest level of function for planned discharge setting. See evaluation for individualized goals. Description: FUNCTIONAL STATUS PRIOR TO ADMISSION:  recently started on home O2 2.5L after recent hospital admit, ambulating with rollator walker,  assists pt to step over tub and pt was able to stand and shower on her own, performed all ADLs on her own, was washing dishes,  performed all other IADLS  Receives Help From: Family,  ,  ,  ,  ,  ,  ,  ,  ,  , Active : No     HOME SUPPORT: Patient lived  whom has been struggling to care for pt after recent hospital admit. Occupational Therapy Goals:  Initiated 12/19/2023  1. Patient will perform grooming with Supervision within 7 day(s). 2.  Patient will perform upper body dressing with Supervision within 7 day(s). 3.  Patient will perform lower body dressing with Minimal Assist with AE PRN within 7 day(s). 4.  Patient will perform toilet transfers with Supervision  within 7 day(s). 5.  Patient will perform all aspects of toileting with Minimal Assist within 7 day(s). Outcome: Progressing   OCCUPATIONAL THERAPY TREATMENT  Patient: Maira Bowens (42 y.o. female)  Date: 12/21/2023  Primary Diagnosis: Fever and chills [R50.9]  Pleural effusion [J90]  Acute encephalopathy [G93.40]  Nausea and vomiting, unspecified vomiting type [R11.2]       Precautions: Fall Risk, Bed Alarm, Contact Precautions (DNR, no feeling B feet)                Chart, occupational therapy assessment, plan of care, and goals were reviewed. ASSESSMENT  Patient continues to benefit from skilled OT services and is progressing towards goals. Good participation with OT; dressing off R foot on approach; RN notified. Patient with bright affect and consistently willing to work.  Patient did not report pain, able to sit edge of bed after requiring 9 minutes for supine to sit;

## 2023-12-22 PROBLEM — B95.62 MRSA BACTEREMIA: Status: ACTIVE | Noted: 2023-12-21

## 2023-12-22 LAB
ANION GAP SERPL CALC-SCNC: 2 MMOL/L (ref 5–15)
BASOPHILS # BLD: 0 K/UL (ref 0–0.1)
BASOPHILS NFR BLD: 1 % (ref 0–1)
BUN SERPL-MCNC: 20 MG/DL (ref 6–20)
BUN/CREAT SERPL: 15 (ref 12–20)
CALCIUM SERPL-MCNC: 8.8 MG/DL (ref 8.5–10.1)
CHLORIDE SERPL-SCNC: 109 MMOL/L (ref 97–108)
CO2 SERPL-SCNC: 31 MMOL/L (ref 21–32)
CREAT SERPL-MCNC: 1.3 MG/DL (ref 0.55–1.02)
DIFFERENTIAL METHOD BLD: ABNORMAL
EOSINOPHIL # BLD: 0.2 K/UL (ref 0–0.4)
EOSINOPHIL NFR BLD: 7 % (ref 0–7)
ERYTHROCYTE [DISTWIDTH] IN BLOOD BY AUTOMATED COUNT: 16.3 % (ref 11.5–14.5)
GLUCOSE BLD STRIP.AUTO-MCNC: 121 MG/DL (ref 65–117)
GLUCOSE BLD STRIP.AUTO-MCNC: 140 MG/DL (ref 65–117)
GLUCOSE BLD STRIP.AUTO-MCNC: 150 MG/DL (ref 65–117)
GLUCOSE BLD STRIP.AUTO-MCNC: 95 MG/DL (ref 65–117)
GLUCOSE SERPL-MCNC: 106 MG/DL (ref 65–100)
HCT VFR BLD AUTO: 30.3 % (ref 35–47)
HGB BLD-MCNC: 8.9 G/DL (ref 11.5–16)
IMM GRANULOCYTES # BLD AUTO: 0 K/UL (ref 0–0.04)
IMM GRANULOCYTES NFR BLD AUTO: 0 % (ref 0–0.5)
LYMPHOCYTES # BLD: 0.3 K/UL (ref 0.8–3.5)
LYMPHOCYTES NFR BLD: 10 % (ref 12–49)
MCH RBC QN AUTO: 25.5 PG (ref 26–34)
MCHC RBC AUTO-ENTMCNC: 29.4 G/DL (ref 30–36.5)
MCV RBC AUTO: 86.8 FL (ref 80–99)
MONOCYTES # BLD: 0.3 K/UL (ref 0–1)
MONOCYTES NFR BLD: 11 % (ref 5–13)
NEUTS SEG # BLD: 2.3 K/UL (ref 1.8–8)
NEUTS SEG NFR BLD: 72 % (ref 32–75)
NRBC # BLD: 0 K/UL (ref 0–0.01)
NRBC BLD-RTO: 0 PER 100 WBC
PLATELET # BLD AUTO: 130 K/UL (ref 150–400)
PMV BLD AUTO: 11.5 FL (ref 8.9–12.9)
POTASSIUM SERPL-SCNC: 3.8 MMOL/L (ref 3.5–5.1)
RBC # BLD AUTO: 3.49 M/UL (ref 3.8–5.2)
SERVICE CMNT-IMP: ABNORMAL
SERVICE CMNT-IMP: NORMAL
SODIUM SERPL-SCNC: 142 MMOL/L (ref 136–145)
VANCOMYCIN SERPL-MCNC: 12.7 UG/ML
WBC # BLD AUTO: 3.2 K/UL (ref 3.6–11)

## 2023-12-22 PROCEDURE — 6360000002 HC RX W HCPCS: Performed by: NURSE PRACTITIONER

## 2023-12-22 PROCEDURE — 94760 N-INVAS EAR/PLS OXIMETRY 1: CPT

## 2023-12-22 PROCEDURE — 99233 SBSQ HOSP IP/OBS HIGH 50: CPT | Performed by: INTERNAL MEDICINE

## 2023-12-22 PROCEDURE — 82962 GLUCOSE BLOOD TEST: CPT

## 2023-12-22 PROCEDURE — 6360000002 HC RX W HCPCS: Performed by: INTERNAL MEDICINE

## 2023-12-22 PROCEDURE — 80202 ASSAY OF VANCOMYCIN: CPT

## 2023-12-22 PROCEDURE — 2580000003 HC RX 258: Performed by: NURSE PRACTITIONER

## 2023-12-22 PROCEDURE — 94640 AIRWAY INHALATION TREATMENT: CPT

## 2023-12-22 PROCEDURE — 97535 SELF CARE MNGMENT TRAINING: CPT

## 2023-12-22 PROCEDURE — G0378 HOSPITAL OBSERVATION PER HR: HCPCS

## 2023-12-22 PROCEDURE — 85025 COMPLETE CBC W/AUTO DIFF WBC: CPT

## 2023-12-22 PROCEDURE — 2700000000 HC OXYGEN THERAPY PER DAY

## 2023-12-22 PROCEDURE — 6360000002 HC RX W HCPCS: Performed by: STUDENT IN AN ORGANIZED HEALTH CARE EDUCATION/TRAINING PROGRAM

## 2023-12-22 PROCEDURE — 36415 COLL VENOUS BLD VENIPUNCTURE: CPT

## 2023-12-22 PROCEDURE — 96366 THER/PROPH/DIAG IV INF ADDON: CPT

## 2023-12-22 PROCEDURE — 97530 THERAPEUTIC ACTIVITIES: CPT

## 2023-12-22 PROCEDURE — 6370000000 HC RX 637 (ALT 250 FOR IP): Performed by: NURSE PRACTITIONER

## 2023-12-22 PROCEDURE — 80048 BASIC METABOLIC PNL TOTAL CA: CPT

## 2023-12-22 PROCEDURE — 6370000000 HC RX 637 (ALT 250 FOR IP): Performed by: INTERNAL MEDICINE

## 2023-12-22 PROCEDURE — 96372 THER/PROPH/DIAG INJ SC/IM: CPT

## 2023-12-22 PROCEDURE — 2580000003 HC RX 258: Performed by: INTERNAL MEDICINE

## 2023-12-22 RX ORDER — LEVOFLOXACIN 5 MG/ML
750 INJECTION, SOLUTION INTRAVENOUS EVERY 24 HOURS
Status: DISCONTINUED | OUTPATIENT
Start: 2023-12-22 | End: 2023-12-22

## 2023-12-22 RX ADMIN — METRONIDAZOLE 500 MG: 500 INJECTION, SOLUTION INTRAVENOUS at 10:03

## 2023-12-22 RX ADMIN — TRIAMCINOLONE ACETONIDE: 1 CREAM TOPICAL at 10:00

## 2023-12-22 RX ADMIN — METRONIDAZOLE 500 MG: 500 INJECTION, SOLUTION INTRAVENOUS at 18:54

## 2023-12-22 RX ADMIN — METRONIDAZOLE 500 MG: 500 INJECTION, SOLUTION INTRAVENOUS at 00:56

## 2023-12-22 RX ADMIN — ARFORMOTEROL TARTRATE 15 MCG: 15 SOLUTION RESPIRATORY (INHALATION) at 19:27

## 2023-12-22 RX ADMIN — ARFORMOTEROL TARTRATE 15 MCG: 15 SOLUTION RESPIRATORY (INHALATION) at 08:05

## 2023-12-22 RX ADMIN — POTASSIUM CHLORIDE 10 MEQ: 20 TABLET, EXTENDED RELEASE ORAL at 21:59

## 2023-12-22 RX ADMIN — POTASSIUM CHLORIDE 10 MEQ: 20 TABLET, EXTENDED RELEASE ORAL at 09:55

## 2023-12-22 RX ADMIN — ATORVASTATIN CALCIUM 40 MG: 40 TABLET, FILM COATED ORAL at 21:59

## 2023-12-22 RX ADMIN — Medication: at 22:00

## 2023-12-22 RX ADMIN — CLONAZEPAM 0.5 MG: 0.5 TABLET ORAL at 21:59

## 2023-12-22 RX ADMIN — ENOXAPARIN SODIUM 30 MG: 100 INJECTION SUBCUTANEOUS at 09:57

## 2023-12-22 RX ADMIN — PANTOPRAZOLE SODIUM 40 MG: 40 TABLET, DELAYED RELEASE ORAL at 06:23

## 2023-12-22 RX ADMIN — ISOSORBIDE MONONITRATE 60 MG: 30 TABLET, EXTENDED RELEASE ORAL at 09:54

## 2023-12-22 RX ADMIN — Medication 1000 UNITS: at 09:55

## 2023-12-22 RX ADMIN — TRIAMCINOLONE ACETONIDE: 1 CREAM TOPICAL at 22:04

## 2023-12-22 RX ADMIN — SALINE NASAL SPRAY 2 SPRAY: 1.5 SOLUTION NASAL at 10:00

## 2023-12-22 RX ADMIN — VENLAFAXINE HYDROCHLORIDE 150 MG: 150 CAPSULE, EXTENDED RELEASE ORAL at 09:54

## 2023-12-22 RX ADMIN — Medication: at 16:17

## 2023-12-22 RX ADMIN — Medication 1 CAPSULE: at 13:27

## 2023-12-22 RX ADMIN — ENOXAPARIN SODIUM 30 MG: 100 INJECTION SUBCUTANEOUS at 21:59

## 2023-12-22 RX ADMIN — BUDESONIDE 500 MCG: 0.5 INHALANT RESPIRATORY (INHALATION) at 08:05

## 2023-12-22 RX ADMIN — SALINE NASAL SPRAY 2 SPRAY: 1.5 SOLUTION NASAL at 10:01

## 2023-12-22 RX ADMIN — SODIUM CHLORIDE, PRESERVATIVE FREE 10 ML: 5 INJECTION INTRAVENOUS at 22:00

## 2023-12-22 RX ADMIN — CLOTRIMAZOLE AND BETAMETHASONE DIPROPIONATE: 10; .5 CREAM TOPICAL at 22:05

## 2023-12-22 RX ADMIN — MELATONIN 3 MG: at 21:59

## 2023-12-22 RX ADMIN — BUDESONIDE 500 MCG: 0.5 INHALANT RESPIRATORY (INHALATION) at 19:27

## 2023-12-22 RX ADMIN — CARVEDILOL 25 MG: 12.5 TABLET, FILM COATED ORAL at 17:43

## 2023-12-22 RX ADMIN — PREGABALIN 300 MG: 150 CAPSULE ORAL at 09:54

## 2023-12-22 RX ADMIN — MICONAZOLE NITRATE: 20 CREAM TOPICAL at 22:04

## 2023-12-22 RX ADMIN — LEVOFLOXACIN 750 MG: 5 INJECTION, SOLUTION INTRAVENOUS at 17:25

## 2023-12-22 RX ADMIN — Medication 400 MG: at 09:55

## 2023-12-22 RX ADMIN — Medication 400 MG: at 21:59

## 2023-12-22 RX ADMIN — SODIUM CHLORIDE, PRESERVATIVE FREE 10 ML: 5 INJECTION INTRAVENOUS at 09:59

## 2023-12-22 RX ADMIN — POTASSIUM CHLORIDE 10 MEQ: 20 TABLET, EXTENDED RELEASE ORAL at 17:27

## 2023-12-22 RX ADMIN — VANCOMYCIN HYDROCHLORIDE 1000 MG: 1 INJECTION, POWDER, LYOPHILIZED, FOR SOLUTION INTRAVENOUS at 13:27

## 2023-12-22 RX ADMIN — MUPIROCIN: 20 OINTMENT TOPICAL at 22:05

## 2023-12-22 RX ADMIN — POTASSIUM CHLORIDE 10 MEQ: 20 TABLET, EXTENDED RELEASE ORAL at 13:28

## 2023-12-22 RX ADMIN — BUMETANIDE 2 MG: 1 TABLET ORAL at 09:54

## 2023-12-22 RX ADMIN — CARVEDILOL 25 MG: 12.5 TABLET, FILM COATED ORAL at 09:54

## 2023-12-22 RX ADMIN — VENLAFAXINE HYDROCHLORIDE 150 MG: 150 CAPSULE, EXTENDED RELEASE ORAL at 17:43

## 2023-12-22 RX ADMIN — MICONAZOLE NITRATE: 20 CREAM TOPICAL at 16:17

## 2023-12-22 ASSESSMENT — PAIN SCALES - GENERAL
PAINLEVEL_OUTOF10: 0
PAINLEVEL_OUTOF10: 0

## 2023-12-22 NOTE — PROGRESS NOTES
Infectious Disease progress    Impression    MRSA bacteremia  Blood cultures 12/18+ for MRSA 4/4-RFA/ LA  Repeat cultures 12/20-NG  Echo negative, poor visualization of valves  Patient will require JUAN MANUEL.    Right foot plantar ulcer   Heating pad injury, ongoing for  past 5 months  CT foot 12/20 + for superficial plantar wound 5th MT head  No drainable fluid ongoing.  Appreciate podiatry input  Continue wound care    Acute hypoxic respiratory failure  2ry to CHF  Exacerbation of COPD.    Pacemaker, defibrillator/Watchman present  Patient will require JUAN MANUEL    Diabetes type 2  Diabetic neuropathy  A1c 5.2    Nausea, vomiting, abdominal pain  Resolved    CKD 3 diabetes  Cr 1.3    MDD/FELIPA  Continue home meds    Obesity  BMI 34.76    Plan  Continue Vancomycin IV  Pharmacy to dose per creatinine clearance please  Plan for daptomycin for discharge, given CKD 3, advanced age and   risk for nephrotoxicity  Please give 1 dose prior to discharge  No statin while on daptomycin  Plan is for 4 weeks of MRSA therapy, if endovascular infection present 6 weeks  MRSA decolonization- nasal mupirocin, hibiclens wipes  Patient for JUAN MANUEL evaluate cardiac defibrillator/watchman.    I will be on vacation, please contact ID on-call with questions, concerns.  Antimicrobial orders below, if JUAN MANUEL is + it would need to be extended to 6 weeks end date  1/31/2024      Antimicrobial orders for discharge- if JUAN MANUEL is negative  -Daptomycin 8 mg/kgIV daily 4 weeks end date 1/17/2024  -Do not pull line at end of therapy  -No statin while on daptomycin  Patient will require negative surveillance blood cultures prior to pull of line.    -Weekly CBC, CMP, CK -  -Fax reports to [phone], call with critical labs  at [phone]/ [phone]  -Encourage adequate fluids, daily probiotic/yogurt  -If line malfunction occurs and home health cannot reposition  please send patient to ED immediately  -ID follow-up -1/18/2024 at 3:30 PM  If persistent side effects occur stop  antibiotic and call-ID        Extensive review of chart notes, labs, imaging, cultures done  Additionally review of done: Recent reports-Labs, cultures, imaging  D/w -hospitalist, RN    Eloina Aspen is a 72 y.o.  female with PMHx significant for CHF EF 40-45%, CAD, hypertension, hyperlipidemia, COPD/asthma overlap, LEELA compliant w/CPAP, CKD 3, baseline, MDD/FELIPA/insomnia, GERD. Of note patient is status post recent admission 12/8 to 12/14. She was treated for acute hypoxic respiratory failure, CHF, CKD 3, hypertension. Patient had reported  vomiting after nausea, with generalized abdominal pain, and increased generalized weakness. No history of diarrhea. She was noted to have a mild temp of 100.8 in ER, which was resolved after receiving tylenol. Patient's  had pt return to the ER via EMS due to increased generalized weakness for further evaluation and management . Blood cultures done on 12/18 positive for probable MSSA  4/4 drawn fr  om RFA/LA. mec a & mrej is positive on BC ID panel. Discussed with microbiology. Probable MRSA, final cultures to follow. Patient seen today. Sitting up in bed. Complains of some shortness of breath. No more vomiting. Patient noted to have an ulcer on plantar aspect of right foot. Discussed with wound care. CT negative for OM. Pt seen today. Resting, arousable. Past Medical History:   Diagnosis Date    Age-related osteoporosis without current pathological fracture     Anxiety and depression 01/22/2018    Arthritis     OA    Asthma     CAD (coronary artery disease)     Chronic systolic heart failure (HCC)     CKD stage G3b/A1, GFR 30-44 and albumin creatinine ratio <30 mg/g (HCC)     Essential hypertension     GERD (gastroesophageal reflux disease)     History of diverticulitis     diverticulitis    History of echocardiogram 11/2021    LV: Estimated LVEF is 40 - 45%. Visually measured ejection fraction. Normal cavity size and wall thickness.  Globally reduced

## 2023-12-22 NOTE — PLAN OF CARE
Problem: Physical Therapy - Adult  Goal: By Discharge: Performs mobility at highest level of function for planned discharge setting. See evaluation for individualized goals. Description: FUNCTIONAL STATUS PRIOR TO ADMISSION: Patient was modified independent using a rollator for functional mobility. and At baseline the patient was on 2.5 liters/min of supplemental O2 continuously. HOME SUPPORT PRIOR TO ADMISSION: The patient lived with spouse but did not require assistance. Physical Therapy Goals  Initiated 12/19/2023  1. Patient will move from supine to sit and sit to supine and roll side to side in bed with contact guard assist within 7 day(s). 2.  Patient will perform sit to stand with contact guard assist within 7 day(s). 3.  Patient will transfer from bed to chair and chair to bed with contact guard assist using the least restrictive device within 7 day(s). 4.  Patient will ambulate with contact guard assist for 30 feet with the least restrictive device within 7 day(s). Outcome: Progressing   PHYSICAL THERAPY TREATMENT    Patient: Anca Osman (46 y.o. female)  Date: 12/22/2023  Diagnosis: Fever and chills [R50.9]  Pleural effusion [J90]  Acute encephalopathy [G93.40]  Nausea and vomiting, unspecified vomiting type [R11.2] Fever and chills      Precautions: Fall Risk, Bed Alarm, Contact Precautions (DNR, no feeling B feet)                    ASSESSMENT:  Patient continues to benefit from skilled PT services and is progressing towards goals. Barriers to independence with functional mobility include general weakness, impaired sensation bilat feet, decreased activity tolerance, impaired balance, gait instability, increased risk for fall. Pt demo good effort with all tasks. Moved supine to sit with increased time and min A. Otherwise required min A x 2 for transfer sit to stand to RW x 2 trials, bed<->[facility] stand pivot with RW.  Pt returned to bed at end of session as no recliner available with: registered nurse    Patient Education  Education Given To: Patient  Education Provided: Role of Therapy;Plan of Care;Transfer Training;Fall Prevention Strategies  Education Method: Verbal  Barriers to Learning: None  Education Outcome: Verbalized understanding;Continued education needed      Flora Mathur, PT  Minutes: 34

## 2023-12-22 NOTE — PROGRESS NOTES
Hospitalist Progress Note    NAME:   Marilee Oakes   : 1950   MRN: [id number]     Date/Time: 2023 2:07 PM  Patient PCP: Parish Dumont APRN - NP    Estimated discharge date:   Barriers:Clearance of bacetermia, culture sensitivities, Transesophegeal echo      Assessment / Plan:    Nausea, vomiting and abdominal pain likely enteritis  -CT abdomen shows no acute process.    -LFTs are within normal limits.  Lipase is normal.  Total bilirubin is normal.  Troponin is normal  -She reports improvement of symptoms.  Continue levofloxacin and Flagyl/Vancomycin.  She has no diarrhea.    Staph aureus bacteremia 2 x 2  Fever  Right foot ulcer  -Blood cultures from  are showing Staph aureus bacteremia in 2 sets.  Continue Vancomycin for Abx coverage  -Follow up culture sensitivities  -Follow up Surveillance cultures  -Check CT of right foot due to right foot ulcer.  Consult podiatry.  Patient cannot have MRI due to pacemaker  -Follow repeat blood cultures  -Check CBC in a.m.  -ID consult appreciated, continue to follow recommmendations  -TTE results reviewed  -Pt to get Trans esophageal echo on , will follow up results     Systolic congestive heart failure-continue Bumex, Coreg  Hypertension, chronic  Hyperlipidemia, chronic  Copd/asthma overlap- requiring 2lpm o2, discharged 23 w/o2  MDD/FELIPA/insomnia, chronic  CKD 3, baseline, baseline cret 2019-present revealed 1.34-2.03.  GERD, chronic  Dermatitis, chronic, eczema, and candida pannus  CAD, h/o, presently asymptomatic  T2DM w/neuropathy chronic, w/o insulin use- diet controlled  Mild anemia- slightly improved since last check, asymptomatic, w/o reported bleeding per pt, recent iron profile wnl  LEELA compliant with cpap   -home meds resumed  -nocturnal cpap ordered, may use home cpap if available  -NF substitution Dulera  -duonebs prn  -NF substitution protonix      Medical Decision Making:   I personally reviewed labs: CBC, BMP  I  NO  Reviewed patient's current orders and MAR    YES  PMH/SH reviewed - no change compared to H&P    Procedures: see electronic medical records for all procedures/Xrays and details which were not copied into this note but were reviewed prior to creation of Plan. LABS:  I reviewed today's most current labs and imaging studies. Pertinent labs include:  Recent Labs     12/20/23 0313 12/21/23  0426 12/22/23  0523   WBC 3.3* 4.0 3.2*   HGB 8.9* 9.1* 8.9*   HCT 31.0* 31.1* 30.3*   * 144* 130*       Recent Labs     12/20/23 0316 12/21/23  0426 12/22/23  0523    142 142   K 4.0 3.7 3.8   * 108 109*   CO2 24 30 31   GLUCOSE 86 117* 106*   BUN 23* 25* 20   CREATININE 1.54* 1.53* 1.30*   CALCIUM 8.5 8.6 8.8         Signed:  Fatoumata Rivera MD

## 2023-12-22 NOTE — CONSULTS
[facility]        Consult    NAME: Shireen Soto   :  1950   MRN:  [id number]     Date/Time:  2023 11:02 AM    Patient PCP: LIZZY Moctezuma NP  ________________________________________________________________________     Assessment:     Foot wound  Staph bacteremia    1) CAD (Prox LAD KAY 2014 for NQWMI), Neg PET for ischemia 2018. Min CAD 2019.  2) CM: Mixed ischemic/tachycardia mediated CM 2014 (EF 20%); EF 45% 2017. Jen Roger was too expensive. *EF 15-20% 2018. EF 30% w/ minimal CAD at cath 2019. EF 20-25% (m-mod MR; mod TR) 2019. *EF 35-40% 2020 (admit for CHF: too much salt). *EF 40-45% 2021 (@ [facility] for CHF, off some meds after kyphoplasty/low BPs)          *EF 50% 2022 (AS mild w/ mean 16). 3) HTN,   4) AFIB: PAfib (RFA 2016 and 2017), Recurrent/persistent afib : AV node ablation 10/2018. *implant of the 24 mm Watchman device in the anterior chicken wing DEMARIO 10/2021. *AC stopped 2021 after JUAN MANUEL showed no leak around Watchman device. 5) St Sudhakar PPM 2014 for bradycardia while on therapy for AFib: changed to BiV ICD 10/2018.  6) Dyslipidemia   ( labs per PCP). 7) CKD: Aldactone stopped in . Cr 2 2018 (after mary): Cr 1.5 2018 & 2019;            *Cr 1.4/gfr 39 2020. Cr 1.9/gfr 26 3/2021 (avoiding ACEi/ARB). (Dr. Isabelle Carolina)  8) DM  9) nephrolithiasis  10) LEELA (on CPAP),   11) cholecystectomy 2018.  12) LE venous insufficiency:  B SFV ablation 3/2020. Compression stockings/wraps changed to pump (1 hr bid). Obesity: 262# 2019. 270 to 281# . 279# 2020; 281# 2020. 257# 3/2021. 233# .    2022: Medication list not provided today. No anginal chest pain nor change in dyspnea. Rare fleeting atypical chest pain. No palpitations. Home BP well controlled 120-130.    2023: No chest pain, dyspnea, or palpitations. Using a walker.   Home BP well controlled

## 2023-12-22 NOTE — PLAN OF CARE
Problem: Occupational Therapy - Adult  Goal: By Discharge: Performs self-care activities at highest level of function for planned discharge setting. See evaluation for individualized goals. Description: FUNCTIONAL STATUS PRIOR TO ADMISSION:  recently started on home O2 2.5L after recent hospital admit, ambulating with rollator walker,  assists pt to step over tub and pt was able to stand and shower on her own, performed all ADLs on her own, was washing dishes,  performed all other IADLS  Receives Help From: Family,  ,  ,  ,  ,  ,  ,  ,  ,  , Active : No     HOME SUPPORT: Patient lived  whom has been struggling to care for pt after recent hospital admit. Occupational Therapy Goals:  Initiated 12/19/2023  1. Patient will perform grooming with Supervision within 7 day(s). 2.  Patient will perform upper body dressing with Supervision within 7 day(s). 3.  Patient will perform lower body dressing with Minimal Assist with AE PRN within 7 day(s). 4.  Patient will perform toilet transfers with Supervision  within 7 day(s). 5.  Patient will perform all aspects of toileting with Minimal Assist within 7 day(s). Outcome: Progressing   OCCUPATIONAL THERAPY TREATMENT  Patient: Anca Osman (48 y.o. female)  Date: 12/22/2023  Primary Diagnosis: Fever and chills [R50.9]  Pleural effusion [J90]  Acute encephalopathy [G93.40]  Nausea and vomiting, unspecified vomiting type [R11.2]       Precautions: Fall Risk, Bed Alarm, Contact Precautions (DNR, no feeling B feet)                Chart, occupational therapy assessment, plan of care, and goals were reviewed. ASSESSMENT  Patient continues to benefit from skilled OT services and is progressing towards goals.  Good participation with self care and functional mobility tasks; able to consistently follow commands, good sitting balance edge of bed for LE ADLs but fatigues quickly, recommend spouse to continue to assist due to wound on R foot which she is unable to feel.              PLAN :  Patient continues to benefit from skilled intervention to address the above impairments.  Continue treatment per established plan of care to address goals.    Recommend with staff: Patient up to chair for all meals with RW to recliner up 1-2 hours max/LEs elevated    Recommend next OT session: toileting ([facility] trial)    Recommendation for discharge: (in order for the patient to meet his/her long term goals): Therapy up to 5 days/week in Skilled nursing facility    Other factors to consider for discharge: patient's current support system is unable to meet their requirements for physical assistance, high risk for falls, and not safe to be alone    IF patient discharges home will need the following DME: continuing to assess with progress       SUBJECTIVE:   Patient stated “My  will help with what I need.”    OBJECTIVE DATA SUMMARY:   Cognitive/Behavioral Status:  Orientation  Overall Orientation Status: Within Functional Limits  Orientation Level: Oriented X4  Cognition  Overall Cognitive Status: WFL    Functional Mobility and Transfers for ADLs:  Bed Mobility:  Bed Mobility Training  Bed Mobility Training: Yes  Interventions: Verbal cues  Supine to Sit: Additional time;Minimum assistance  Sit to Supine: Stand-by assistance;Additional time  Scooting: Stand-by assistance;Additional time (scooting in supine, fatigued quickly)     Transfers:   Transfer Training  Transfer Training: Yes  Sit to Stand: Minimum assistance;Assist X2  Stand to Sit: Minimum assistance  Stand Pivot Transfers: Minimum assistance;Assist X2;Additional time (bed<->BSC stand pivot with RW)           Balance:  Standing: Impaired  Balance  Sitting: Impaired  Sitting - Static: Good (unsupported)  Sitting - Dynamic: Fair (occasional)  Standing: Impaired  Standing - Static: Constant support;Fair  Standing - Dynamic: Constant support;Fair      ADL Intervention:         Feeding: Setup;Modified

## 2023-12-23 LAB
ANION GAP SERPL CALC-SCNC: 5 MMOL/L (ref 5–15)
BASOPHILS # BLD: 0 K/UL (ref 0–0.1)
BASOPHILS NFR BLD: 0 % (ref 0–1)
BUN SERPL-MCNC: 20 MG/DL (ref 6–20)
BUN/CREAT SERPL: 14 (ref 12–20)
CALCIUM SERPL-MCNC: 9.1 MG/DL (ref 8.5–10.1)
CHLORIDE SERPL-SCNC: 109 MMOL/L (ref 97–108)
CO2 SERPL-SCNC: 30 MMOL/L (ref 21–32)
CREAT SERPL-MCNC: 1.38 MG/DL (ref 0.55–1.02)
DIFFERENTIAL METHOD BLD: ABNORMAL
EOSINOPHIL # BLD: 0 K/UL (ref 0–0.4)
EOSINOPHIL NFR BLD: 0 % (ref 0–7)
ERYTHROCYTE [DISTWIDTH] IN BLOOD BY AUTOMATED COUNT: 16.3 % (ref 11.5–14.5)
GLUCOSE BLD STRIP.AUTO-MCNC: 102 MG/DL (ref 65–117)
GLUCOSE BLD STRIP.AUTO-MCNC: 132 MG/DL (ref 65–117)
GLUCOSE BLD STRIP.AUTO-MCNC: 149 MG/DL (ref 65–117)
GLUCOSE BLD STRIP.AUTO-MCNC: 95 MG/DL (ref 65–117)
GLUCOSE BLD STRIP.AUTO-MCNC: 99 MG/DL (ref 65–117)
GLUCOSE SERPL-MCNC: 112 MG/DL (ref 65–100)
HCT VFR BLD AUTO: 30.8 % (ref 35–47)
HGB BLD-MCNC: 9.2 G/DL (ref 11.5–16)
IMM GRANULOCYTES # BLD AUTO: 0 K/UL (ref 0–0.04)
IMM GRANULOCYTES NFR BLD AUTO: 1 % (ref 0–0.5)
LYMPHOCYTES # BLD: 0.3 K/UL (ref 0.8–3.5)
LYMPHOCYTES NFR BLD: 10 % (ref 12–49)
MCH RBC QN AUTO: 25.9 PG (ref 26–34)
MCHC RBC AUTO-ENTMCNC: 29.9 G/DL (ref 30–36.5)
MCV RBC AUTO: 86.8 FL (ref 80–99)
MONOCYTES # BLD: 0.3 K/UL (ref 0–1)
MONOCYTES NFR BLD: 10 % (ref 5–13)
NEUTS SEG # BLD: 2.5 K/UL (ref 1.8–8)
NEUTS SEG NFR BLD: 79 % (ref 32–75)
NRBC # BLD: 0 K/UL (ref 0–0.01)
NRBC BLD-RTO: 0 PER 100 WBC
PLATELET # BLD AUTO: 139 K/UL (ref 150–400)
PMV BLD AUTO: 12.2 FL (ref 8.9–12.9)
POTASSIUM SERPL-SCNC: 3.9 MMOL/L (ref 3.5–5.1)
RBC # BLD AUTO: 3.55 M/UL (ref 3.8–5.2)
SERVICE CMNT-IMP: ABNORMAL
SERVICE CMNT-IMP: ABNORMAL
SERVICE CMNT-IMP: NORMAL
SODIUM SERPL-SCNC: 144 MMOL/L (ref 136–145)
WBC # BLD AUTO: 3.2 K/UL (ref 3.6–11)

## 2023-12-23 PROCEDURE — 2580000003 HC RX 258: Performed by: NURSE PRACTITIONER

## 2023-12-23 PROCEDURE — 6370000000 HC RX 637 (ALT 250 FOR IP): Performed by: NURSE PRACTITIONER

## 2023-12-23 PROCEDURE — 96366 THER/PROPH/DIAG IV INF ADDON: CPT

## 2023-12-23 PROCEDURE — 6360000002 HC RX W HCPCS: Performed by: NURSE PRACTITIONER

## 2023-12-23 PROCEDURE — 80048 BASIC METABOLIC PNL TOTAL CA: CPT

## 2023-12-23 PROCEDURE — 2700000000 HC OXYGEN THERAPY PER DAY

## 2023-12-23 PROCEDURE — G0378 HOSPITAL OBSERVATION PER HR: HCPCS

## 2023-12-23 PROCEDURE — 94761 N-INVAS EAR/PLS OXIMETRY MLT: CPT

## 2023-12-23 PROCEDURE — 85025 COMPLETE CBC W/AUTO DIFF WBC: CPT

## 2023-12-23 PROCEDURE — 94640 AIRWAY INHALATION TREATMENT: CPT

## 2023-12-23 PROCEDURE — 82962 GLUCOSE BLOOD TEST: CPT

## 2023-12-23 PROCEDURE — 6370000000 HC RX 637 (ALT 250 FOR IP): Performed by: INTERNAL MEDICINE

## 2023-12-23 PROCEDURE — 6360000002 HC RX W HCPCS: Performed by: INTERNAL MEDICINE

## 2023-12-23 PROCEDURE — 2580000003 HC RX 258: Performed by: INTERNAL MEDICINE

## 2023-12-23 PROCEDURE — 6360000002 HC RX W HCPCS: Performed by: STUDENT IN AN ORGANIZED HEALTH CARE EDUCATION/TRAINING PROGRAM

## 2023-12-23 PROCEDURE — 96372 THER/PROPH/DIAG INJ SC/IM: CPT

## 2023-12-23 PROCEDURE — 36415 COLL VENOUS BLD VENIPUNCTURE: CPT

## 2023-12-23 RX ADMIN — SODIUM CHLORIDE, PRESERVATIVE FREE 10 ML: 5 INJECTION INTRAVENOUS at 22:00

## 2023-12-23 RX ADMIN — POTASSIUM CHLORIDE 10 MEQ: 20 TABLET, EXTENDED RELEASE ORAL at 08:20

## 2023-12-23 RX ADMIN — ISOSORBIDE MONONITRATE 60 MG: 30 TABLET, EXTENDED RELEASE ORAL at 08:20

## 2023-12-23 RX ADMIN — TRIAMCINOLONE ACETONIDE: 1 CREAM TOPICAL at 08:23

## 2023-12-23 RX ADMIN — BUDESONIDE 500 MCG: 0.5 INHALANT RESPIRATORY (INHALATION) at 07:36

## 2023-12-23 RX ADMIN — Medication: at 08:24

## 2023-12-23 RX ADMIN — VENLAFAXINE HYDROCHLORIDE 150 MG: 150 CAPSULE, EXTENDED RELEASE ORAL at 16:56

## 2023-12-23 RX ADMIN — MUPIROCIN: 20 OINTMENT TOPICAL at 08:25

## 2023-12-23 RX ADMIN — Medication 400 MG: at 21:49

## 2023-12-23 RX ADMIN — POTASSIUM CHLORIDE 10 MEQ: 20 TABLET, EXTENDED RELEASE ORAL at 14:12

## 2023-12-23 RX ADMIN — VANCOMYCIN HYDROCHLORIDE 1000 MG: 1 INJECTION, POWDER, LYOPHILIZED, FOR SOLUTION INTRAVENOUS at 14:18

## 2023-12-23 RX ADMIN — Medication: at 21:55

## 2023-12-23 RX ADMIN — PREGABALIN 300 MG: 150 CAPSULE ORAL at 08:21

## 2023-12-23 RX ADMIN — SODIUM CHLORIDE, PRESERVATIVE FREE 10 ML: 5 INJECTION INTRAVENOUS at 08:21

## 2023-12-23 RX ADMIN — CARVEDILOL 25 MG: 12.5 TABLET, FILM COATED ORAL at 16:57

## 2023-12-23 RX ADMIN — CLOTRIMAZOLE AND BETAMETHASONE DIPROPIONATE: 10; .5 CREAM TOPICAL at 21:57

## 2023-12-23 RX ADMIN — CLONAZEPAM 0.5 MG: 0.5 TABLET ORAL at 21:49

## 2023-12-23 RX ADMIN — MICONAZOLE NITRATE: 20 CREAM TOPICAL at 08:24

## 2023-12-23 RX ADMIN — BUDESONIDE 500 MCG: 0.5 INHALANT RESPIRATORY (INHALATION) at 21:31

## 2023-12-23 RX ADMIN — BUMETANIDE 2 MG: 1 TABLET ORAL at 08:20

## 2023-12-23 RX ADMIN — ATORVASTATIN CALCIUM 40 MG: 40 TABLET, FILM COATED ORAL at 21:49

## 2023-12-23 RX ADMIN — ARFORMOTEROL TARTRATE 15 MCG: 15 SOLUTION RESPIRATORY (INHALATION) at 07:36

## 2023-12-23 RX ADMIN — ENOXAPARIN SODIUM 30 MG: 100 INJECTION SUBCUTANEOUS at 08:19

## 2023-12-23 RX ADMIN — ENOXAPARIN SODIUM 30 MG: 100 INJECTION SUBCUTANEOUS at 21:48

## 2023-12-23 RX ADMIN — MUPIROCIN: 20 OINTMENT TOPICAL at 21:57

## 2023-12-23 RX ADMIN — CLOTRIMAZOLE AND BETAMETHASONE DIPROPIONATE: 10; .5 CREAM TOPICAL at 08:25

## 2023-12-23 RX ADMIN — PANTOPRAZOLE SODIUM 40 MG: 40 TABLET, DELAYED RELEASE ORAL at 05:59

## 2023-12-23 RX ADMIN — Medication 1 CAPSULE: at 14:13

## 2023-12-23 RX ADMIN — VENLAFAXINE HYDROCHLORIDE 150 MG: 150 CAPSULE, EXTENDED RELEASE ORAL at 08:19

## 2023-12-23 RX ADMIN — CARVEDILOL 25 MG: 12.5 TABLET, FILM COATED ORAL at 08:19

## 2023-12-23 RX ADMIN — Medication 1000 UNITS: at 08:20

## 2023-12-23 RX ADMIN — POTASSIUM CHLORIDE 10 MEQ: 20 TABLET, EXTENDED RELEASE ORAL at 16:56

## 2023-12-23 RX ADMIN — Medication 400 MG: at 08:20

## 2023-12-23 RX ADMIN — ARFORMOTEROL TARTRATE 15 MCG: 15 SOLUTION RESPIRATORY (INHALATION) at 21:30

## 2023-12-23 RX ADMIN — MICONAZOLE NITRATE: 20 CREAM TOPICAL at 21:56

## 2023-12-23 RX ADMIN — POTASSIUM CHLORIDE 10 MEQ: 20 TABLET, EXTENDED RELEASE ORAL at 21:50

## 2023-12-23 ASSESSMENT — PAIN SCALES - GENERAL
PAINLEVEL_OUTOF10: 0

## 2023-12-24 ENCOUNTER — APPOINTMENT (OUTPATIENT)
Facility: HOSPITAL | Age: 73
DRG: 871 | End: 2023-12-24
Payer: MEDICARE

## 2023-12-24 LAB
ANION GAP SERPL CALC-SCNC: 2 MMOL/L (ref 5–15)
BASOPHILS # BLD: 0 K/UL (ref 0–0.1)
BASOPHILS NFR BLD: 0 % (ref 0–1)
BUN SERPL-MCNC: 22 MG/DL (ref 6–20)
BUN/CREAT SERPL: 15 (ref 12–20)
CALCIUM SERPL-MCNC: 9.1 MG/DL (ref 8.5–10.1)
CHLORIDE SERPL-SCNC: 105 MMOL/L (ref 97–108)
CO2 SERPL-SCNC: 35 MMOL/L (ref 21–32)
CREAT SERPL-MCNC: 1.48 MG/DL (ref 0.55–1.02)
DIFFERENTIAL METHOD BLD: ABNORMAL
EOSINOPHIL # BLD: 0 K/UL (ref 0–0.4)
EOSINOPHIL NFR BLD: 1 % (ref 0–7)
ERYTHROCYTE [DISTWIDTH] IN BLOOD BY AUTOMATED COUNT: 16.6 % (ref 11.5–14.5)
GLUCOSE BLD STRIP.AUTO-MCNC: 112 MG/DL (ref 65–117)
GLUCOSE BLD STRIP.AUTO-MCNC: 162 MG/DL (ref 65–117)
GLUCOSE BLD STRIP.AUTO-MCNC: 198 MG/DL (ref 65–117)
GLUCOSE BLD STRIP.AUTO-MCNC: 95 MG/DL (ref 65–117)
GLUCOSE SERPL-MCNC: 79 MG/DL (ref 65–100)
HCT VFR BLD AUTO: 31.5 % (ref 35–47)
HGB BLD-MCNC: 9.4 G/DL (ref 11.5–16)
IMM GRANULOCYTES # BLD AUTO: 0 K/UL (ref 0–0.04)
IMM GRANULOCYTES NFR BLD AUTO: 1 % (ref 0–0.5)
LYMPHOCYTES # BLD: 0.5 K/UL (ref 0.8–3.5)
LYMPHOCYTES NFR BLD: 17 % (ref 12–49)
MCH RBC QN AUTO: 25.3 PG (ref 26–34)
MCHC RBC AUTO-ENTMCNC: 29.8 G/DL (ref 30–36.5)
MCV RBC AUTO: 84.9 FL (ref 80–99)
MONOCYTES # BLD: 0.3 K/UL (ref 0–1)
MONOCYTES NFR BLD: 10 % (ref 5–13)
NEUTS SEG # BLD: 2.2 K/UL (ref 1.8–8)
NEUTS SEG NFR BLD: 71 % (ref 32–75)
NRBC # BLD: 0 K/UL (ref 0–0.01)
NRBC BLD-RTO: 0 PER 100 WBC
PLATELET # BLD AUTO: 142 K/UL (ref 150–400)
PMV BLD AUTO: 12 FL (ref 8.9–12.9)
POTASSIUM SERPL-SCNC: 3.8 MMOL/L (ref 3.5–5.1)
RBC # BLD AUTO: 3.71 M/UL (ref 3.8–5.2)
SERVICE CMNT-IMP: ABNORMAL
SERVICE CMNT-IMP: ABNORMAL
SERVICE CMNT-IMP: NORMAL
SERVICE CMNT-IMP: NORMAL
SODIUM SERPL-SCNC: 142 MMOL/L (ref 136–145)
WBC # BLD AUTO: 3.1 K/UL (ref 3.6–11)

## 2023-12-24 PROCEDURE — 2580000003 HC RX 258: Performed by: NURSE PRACTITIONER

## 2023-12-24 PROCEDURE — 2700000000 HC OXYGEN THERAPY PER DAY

## 2023-12-24 PROCEDURE — 6360000002 HC RX W HCPCS: Performed by: NURSE PRACTITIONER

## 2023-12-24 PROCEDURE — G0378 HOSPITAL OBSERVATION PER HR: HCPCS

## 2023-12-24 PROCEDURE — 6370000000 HC RX 637 (ALT 250 FOR IP): Performed by: INTERNAL MEDICINE

## 2023-12-24 PROCEDURE — 82962 GLUCOSE BLOOD TEST: CPT

## 2023-12-24 PROCEDURE — 71045 X-RAY EXAM CHEST 1 VIEW: CPT

## 2023-12-24 PROCEDURE — 96372 THER/PROPH/DIAG INJ SC/IM: CPT

## 2023-12-24 PROCEDURE — 6360000002 HC RX W HCPCS: Performed by: STUDENT IN AN ORGANIZED HEALTH CARE EDUCATION/TRAINING PROGRAM

## 2023-12-24 PROCEDURE — 94761 N-INVAS EAR/PLS OXIMETRY MLT: CPT

## 2023-12-24 PROCEDURE — 80048 BASIC METABOLIC PNL TOTAL CA: CPT

## 2023-12-24 PROCEDURE — 2580000003 HC RX 258: Performed by: INTERNAL MEDICINE

## 2023-12-24 PROCEDURE — 6360000002 HC RX W HCPCS: Performed by: INTERNAL MEDICINE

## 2023-12-24 PROCEDURE — 6370000000 HC RX 637 (ALT 250 FOR IP): Performed by: NURSE PRACTITIONER

## 2023-12-24 PROCEDURE — 85025 COMPLETE CBC W/AUTO DIFF WBC: CPT

## 2023-12-24 PROCEDURE — 94760 N-INVAS EAR/PLS OXIMETRY 1: CPT

## 2023-12-24 PROCEDURE — 94640 AIRWAY INHALATION TREATMENT: CPT

## 2023-12-24 PROCEDURE — 36415 COLL VENOUS BLD VENIPUNCTURE: CPT

## 2023-12-24 PROCEDURE — 96366 THER/PROPH/DIAG IV INF ADDON: CPT

## 2023-12-24 RX ORDER — IPRATROPIUM BROMIDE AND ALBUTEROL SULFATE 2.5; .5 MG/3ML; MG/3ML
1 SOLUTION RESPIRATORY (INHALATION) EVERY 4 HOURS PRN
Status: DISCONTINUED | OUTPATIENT
Start: 2023-12-24 | End: 2023-12-29 | Stop reason: HOSPADM

## 2023-12-24 RX ADMIN — Medication 400 MG: at 09:15

## 2023-12-24 RX ADMIN — BUDESONIDE 500 MCG: 0.5 INHALANT RESPIRATORY (INHALATION) at 07:32

## 2023-12-24 RX ADMIN — CARVEDILOL 25 MG: 12.5 TABLET, FILM COATED ORAL at 18:16

## 2023-12-24 RX ADMIN — POTASSIUM CHLORIDE 10 MEQ: 20 TABLET, EXTENDED RELEASE ORAL at 09:15

## 2023-12-24 RX ADMIN — MICONAZOLE NITRATE: 20 CREAM TOPICAL at 22:40

## 2023-12-24 RX ADMIN — ARFORMOTEROL TARTRATE 15 MCG: 15 SOLUTION RESPIRATORY (INHALATION) at 07:32

## 2023-12-24 RX ADMIN — ARFORMOTEROL TARTRATE 15 MCG: 15 SOLUTION RESPIRATORY (INHALATION) at 19:39

## 2023-12-24 RX ADMIN — Medication: at 09:17

## 2023-12-24 RX ADMIN — IPRATROPIUM BROMIDE AND ALBUTEROL SULFATE 1 DOSE: 2.5; .5 SOLUTION RESPIRATORY (INHALATION) at 19:38

## 2023-12-24 RX ADMIN — Medication 1000 UNITS: at 09:15

## 2023-12-24 RX ADMIN — SODIUM CHLORIDE, PRESERVATIVE FREE 10 ML: 5 INJECTION INTRAVENOUS at 09:13

## 2023-12-24 RX ADMIN — CLOTRIMAZOLE AND BETAMETHASONE DIPROPIONATE: 10; .5 CREAM TOPICAL at 09:18

## 2023-12-24 RX ADMIN — SODIUM CHLORIDE, PRESERVATIVE FREE 10 ML: 5 INJECTION INTRAVENOUS at 21:00

## 2023-12-24 RX ADMIN — PANTOPRAZOLE SODIUM 40 MG: 40 TABLET, DELAYED RELEASE ORAL at 06:37

## 2023-12-24 RX ADMIN — MUPIROCIN: 20 OINTMENT TOPICAL at 09:19

## 2023-12-24 RX ADMIN — MUPIROCIN: 20 OINTMENT TOPICAL at 22:41

## 2023-12-24 RX ADMIN — VANCOMYCIN HYDROCHLORIDE 1000 MG: 1 INJECTION, POWDER, LYOPHILIZED, FOR SOLUTION INTRAVENOUS at 13:03

## 2023-12-24 RX ADMIN — SODIUM CHLORIDE, PRESERVATIVE FREE 10 ML: 5 INJECTION INTRAVENOUS at 23:04

## 2023-12-24 RX ADMIN — Medication: at 22:38

## 2023-12-24 RX ADMIN — ENOXAPARIN SODIUM 30 MG: 100 INJECTION SUBCUTANEOUS at 09:14

## 2023-12-24 RX ADMIN — ISOSORBIDE MONONITRATE 60 MG: 30 TABLET, EXTENDED RELEASE ORAL at 09:16

## 2023-12-24 RX ADMIN — CLOTRIMAZOLE AND BETAMETHASONE DIPROPIONATE: 10; .5 CREAM TOPICAL at 22:39

## 2023-12-24 RX ADMIN — PREGABALIN 300 MG: 150 CAPSULE ORAL at 09:16

## 2023-12-24 RX ADMIN — BUDESONIDE 500 MCG: 0.5 INHALANT RESPIRATORY (INHALATION) at 19:39

## 2023-12-24 RX ADMIN — CLONAZEPAM 0.5 MG: 0.5 TABLET ORAL at 22:37

## 2023-12-24 RX ADMIN — VENLAFAXINE HYDROCHLORIDE 150 MG: 150 CAPSULE, EXTENDED RELEASE ORAL at 09:15

## 2023-12-24 RX ADMIN — Medication 1 CAPSULE: at 13:04

## 2023-12-24 RX ADMIN — POTASSIUM CHLORIDE 10 MEQ: 20 TABLET, EXTENDED RELEASE ORAL at 18:17

## 2023-12-24 RX ADMIN — Medication 400 MG: at 22:37

## 2023-12-24 RX ADMIN — ENOXAPARIN SODIUM 30 MG: 100 INJECTION SUBCUTANEOUS at 22:36

## 2023-12-24 RX ADMIN — POTASSIUM CHLORIDE 10 MEQ: 20 TABLET, EXTENDED RELEASE ORAL at 13:04

## 2023-12-24 RX ADMIN — POTASSIUM CHLORIDE 10 MEQ: 20 TABLET, EXTENDED RELEASE ORAL at 22:36

## 2023-12-24 RX ADMIN — MICONAZOLE NITRATE: 20 CREAM TOPICAL at 09:19

## 2023-12-24 RX ADMIN — BUMETANIDE 2 MG: 1 TABLET ORAL at 09:15

## 2023-12-24 RX ADMIN — CARVEDILOL 25 MG: 12.5 TABLET, FILM COATED ORAL at 09:15

## 2023-12-24 RX ADMIN — ATORVASTATIN CALCIUM 40 MG: 40 TABLET, FILM COATED ORAL at 22:37

## 2023-12-24 RX ADMIN — VENLAFAXINE HYDROCHLORIDE 150 MG: 150 CAPSULE, EXTENDED RELEASE ORAL at 18:17

## 2023-12-24 RX ADMIN — TRIAMCINOLONE ACETONIDE: 1 CREAM TOPICAL at 09:18

## 2023-12-24 ASSESSMENT — PAIN SCALES - GENERAL: PAINLEVEL_OUTOF10: 0

## 2023-12-24 NOTE — PROGRESS NOTES
End of Shift Note    Bedside shift change report given to Melissa ALARCON (oncoming nurse) by Asya Garcia RN (offgoing nurse). Report included the following information SBAR, Kardex, MAR, Recent Results, and Cardiac Rhythm V paced    Shift worked: Night   Shift summary and any significant changes:    Received from the clinical Observation unit on a special bed. Total nursing care pt needing 2 assists to turn and change 2 hourly. The sacrum is intact. The dressings on the right lower leg are intact and for change today. VS were stable and Labs drawn this morning.    Concerns for physician to address:       Zone phone for oncoming shift:          Asya Garcia RN

## 2023-12-25 LAB
ANION GAP SERPL CALC-SCNC: 2 MMOL/L (ref 5–15)
BASOPHILS # BLD: 0 K/UL (ref 0–0.1)
BASOPHILS NFR BLD: 0 % (ref 0–1)
BUN SERPL-MCNC: 27 MG/DL (ref 6–20)
BUN/CREAT SERPL: 18 (ref 12–20)
CALCIUM SERPL-MCNC: 9.1 MG/DL (ref 8.5–10.1)
CHLORIDE SERPL-SCNC: 107 MMOL/L (ref 97–108)
CO2 SERPL-SCNC: 35 MMOL/L (ref 21–32)
CREAT SERPL-MCNC: 1.49 MG/DL (ref 0.55–1.02)
DIFFERENTIAL METHOD BLD: ABNORMAL
EOSINOPHIL # BLD: 0.2 K/UL (ref 0–0.4)
EOSINOPHIL NFR BLD: 7 % (ref 0–7)
ERYTHROCYTE [DISTWIDTH] IN BLOOD BY AUTOMATED COUNT: 16.3 % (ref 11.5–14.5)
GLUCOSE BLD STRIP.AUTO-MCNC: 137 MG/DL (ref 65–117)
GLUCOSE BLD STRIP.AUTO-MCNC: 151 MG/DL (ref 65–117)
GLUCOSE BLD STRIP.AUTO-MCNC: 175 MG/DL (ref 65–117)
GLUCOSE BLD STRIP.AUTO-MCNC: 98 MG/DL (ref 65–117)
GLUCOSE SERPL-MCNC: 96 MG/DL (ref 65–100)
HCT VFR BLD AUTO: 32.8 % (ref 35–47)
HGB BLD-MCNC: 9.7 G/DL (ref 11.5–16)
IMM GRANULOCYTES # BLD AUTO: 0 K/UL (ref 0–0.04)
IMM GRANULOCYTES NFR BLD AUTO: 0 % (ref 0–0.5)
LYMPHOCYTES # BLD: 0.5 K/UL (ref 0.8–3.5)
LYMPHOCYTES NFR BLD: 17 % (ref 12–49)
MCH RBC QN AUTO: 25.4 PG (ref 26–34)
MCHC RBC AUTO-ENTMCNC: 29.6 G/DL (ref 30–36.5)
MCV RBC AUTO: 85.9 FL (ref 80–99)
MONOCYTES # BLD: 0.2 K/UL (ref 0–1)
MONOCYTES NFR BLD: 7 % (ref 5–13)
NEUTS SEG # BLD: 2.1 K/UL (ref 1.8–8)
NEUTS SEG NFR BLD: 69 % (ref 32–75)
NRBC # BLD: 0 K/UL (ref 0–0.01)
NRBC BLD-RTO: 0 PER 100 WBC
PLATELET # BLD AUTO: 144 K/UL (ref 150–400)
PMV BLD AUTO: 11.7 FL (ref 8.9–12.9)
POTASSIUM SERPL-SCNC: 4 MMOL/L (ref 3.5–5.1)
RBC # BLD AUTO: 3.82 M/UL (ref 3.8–5.2)
SERVICE CMNT-IMP: ABNORMAL
SERVICE CMNT-IMP: NORMAL
SODIUM SERPL-SCNC: 144 MMOL/L (ref 136–145)
VANCOMYCIN SERPL-MCNC: 15.7 UG/ML
WBC # BLD AUTO: 3 K/UL (ref 3.6–11)

## 2023-12-25 PROCEDURE — G0378 HOSPITAL OBSERVATION PER HR: HCPCS

## 2023-12-25 PROCEDURE — 6360000002 HC RX W HCPCS: Performed by: INTERNAL MEDICINE

## 2023-12-25 PROCEDURE — 96372 THER/PROPH/DIAG INJ SC/IM: CPT

## 2023-12-25 PROCEDURE — 2500000003 HC RX 250 WO HCPCS: Performed by: INTERNAL MEDICINE

## 2023-12-25 PROCEDURE — 96376 TX/PRO/DX INJ SAME DRUG ADON: CPT

## 2023-12-25 PROCEDURE — 94761 N-INVAS EAR/PLS OXIMETRY MLT: CPT

## 2023-12-25 PROCEDURE — 94640 AIRWAY INHALATION TREATMENT: CPT

## 2023-12-25 PROCEDURE — 80202 ASSAY OF VANCOMYCIN: CPT

## 2023-12-25 PROCEDURE — 2700000000 HC OXYGEN THERAPY PER DAY

## 2023-12-25 PROCEDURE — 2580000003 HC RX 258: Performed by: NURSE PRACTITIONER

## 2023-12-25 PROCEDURE — 6370000000 HC RX 637 (ALT 250 FOR IP): Performed by: NURSE PRACTITIONER

## 2023-12-25 PROCEDURE — 6370000000 HC RX 637 (ALT 250 FOR IP): Performed by: INTERNAL MEDICINE

## 2023-12-25 PROCEDURE — 85025 COMPLETE CBC W/AUTO DIFF WBC: CPT

## 2023-12-25 PROCEDURE — 80048 BASIC METABOLIC PNL TOTAL CA: CPT

## 2023-12-25 PROCEDURE — 36415 COLL VENOUS BLD VENIPUNCTURE: CPT

## 2023-12-25 PROCEDURE — 6360000002 HC RX W HCPCS: Performed by: NURSE PRACTITIONER

## 2023-12-25 PROCEDURE — 6360000002 HC RX W HCPCS: Performed by: STUDENT IN AN ORGANIZED HEALTH CARE EDUCATION/TRAINING PROGRAM

## 2023-12-25 PROCEDURE — 2580000003 HC RX 258: Performed by: INTERNAL MEDICINE

## 2023-12-25 PROCEDURE — 82962 GLUCOSE BLOOD TEST: CPT

## 2023-12-25 PROCEDURE — 96366 THER/PROPH/DIAG IV INF ADDON: CPT

## 2023-12-25 RX ORDER — ASPIRIN 81 MG/1
81 TABLET, CHEWABLE ORAL DAILY
Status: DISCONTINUED | OUTPATIENT
Start: 2023-12-25 | End: 2023-12-29 | Stop reason: HOSPADM

## 2023-12-25 RX ORDER — BUMETANIDE 0.25 MG/ML
2 INJECTION INTRAMUSCULAR; INTRAVENOUS ONCE
Status: DISCONTINUED | OUTPATIENT
Start: 2023-12-25 | End: 2023-12-25

## 2023-12-25 RX ORDER — BUMETANIDE 0.25 MG/ML
2 INJECTION INTRAMUSCULAR; INTRAVENOUS DAILY
Status: DISCONTINUED | OUTPATIENT
Start: 2023-12-26 | End: 2023-12-29 | Stop reason: HOSPADM

## 2023-12-25 RX ORDER — BUMETANIDE 0.25 MG/ML
2 INJECTION INTRAMUSCULAR; INTRAVENOUS ONCE
Status: COMPLETED | OUTPATIENT
Start: 2023-12-25 | End: 2023-12-25

## 2023-12-25 RX ORDER — POTASSIUM CHLORIDE 20 MEQ/1
20 TABLET, EXTENDED RELEASE ORAL ONCE
Status: COMPLETED | OUTPATIENT
Start: 2023-12-25 | End: 2023-12-25

## 2023-12-25 RX ADMIN — VANCOMYCIN HYDROCHLORIDE 1000 MG: 1 INJECTION, POWDER, LYOPHILIZED, FOR SOLUTION INTRAVENOUS at 11:23

## 2023-12-25 RX ADMIN — CLOTRIMAZOLE AND BETAMETHASONE DIPROPIONATE: 10; .5 CREAM TOPICAL at 09:31

## 2023-12-25 RX ADMIN — ATORVASTATIN CALCIUM 40 MG: 40 TABLET, FILM COATED ORAL at 22:11

## 2023-12-25 RX ADMIN — POTASSIUM CHLORIDE 10 MEQ: 20 TABLET, EXTENDED RELEASE ORAL at 18:04

## 2023-12-25 RX ADMIN — CLONAZEPAM 0.5 MG: 0.5 TABLET ORAL at 22:11

## 2023-12-25 RX ADMIN — SALINE NASAL SPRAY 2 SPRAY: 1.5 SOLUTION NASAL at 09:31

## 2023-12-25 RX ADMIN — POTASSIUM CHLORIDE 10 MEQ: 20 TABLET, EXTENDED RELEASE ORAL at 09:35

## 2023-12-25 RX ADMIN — Medication: at 09:31

## 2023-12-25 RX ADMIN — Medication 1000 UNITS: at 09:36

## 2023-12-25 RX ADMIN — PANTOPRAZOLE SODIUM 40 MG: 40 TABLET, DELAYED RELEASE ORAL at 07:21

## 2023-12-25 RX ADMIN — ISOSORBIDE MONONITRATE 60 MG: 30 TABLET, EXTENDED RELEASE ORAL at 09:34

## 2023-12-25 RX ADMIN — CLOTRIMAZOLE AND BETAMETHASONE DIPROPIONATE: 10; .5 CREAM TOPICAL at 22:14

## 2023-12-25 RX ADMIN — ENOXAPARIN SODIUM 30 MG: 100 INJECTION SUBCUTANEOUS at 22:11

## 2023-12-25 RX ADMIN — CARVEDILOL 25 MG: 12.5 TABLET, FILM COATED ORAL at 18:04

## 2023-12-25 RX ADMIN — ENOXAPARIN SODIUM 30 MG: 100 INJECTION SUBCUTANEOUS at 09:34

## 2023-12-25 RX ADMIN — ASPIRIN 81 MG: 81 TABLET, CHEWABLE ORAL at 13:41

## 2023-12-25 RX ADMIN — ARFORMOTEROL TARTRATE 15 MCG: 15 SOLUTION RESPIRATORY (INHALATION) at 09:07

## 2023-12-25 RX ADMIN — VENLAFAXINE HYDROCHLORIDE 150 MG: 150 CAPSULE, EXTENDED RELEASE ORAL at 09:36

## 2023-12-25 RX ADMIN — Medication 400 MG: at 09:35

## 2023-12-25 RX ADMIN — BUMETANIDE 2 MG: 0.25 INJECTION, SOLUTION INTRAMUSCULAR; INTRAVENOUS at 11:15

## 2023-12-25 RX ADMIN — ACETAMINOPHEN 650 MG: 325 TABLET ORAL at 09:40

## 2023-12-25 RX ADMIN — POTASSIUM CHLORIDE 10 MEQ: 20 TABLET, EXTENDED RELEASE ORAL at 13:43

## 2023-12-25 RX ADMIN — POTASSIUM CHLORIDE 20 MEQ: 1500 TABLET, EXTENDED RELEASE ORAL at 11:14

## 2023-12-25 RX ADMIN — Medication 1 CAPSULE: at 11:13

## 2023-12-25 RX ADMIN — BUMETANIDE 2 MG: 1 TABLET ORAL at 09:34

## 2023-12-25 RX ADMIN — VENLAFAXINE HYDROCHLORIDE 150 MG: 150 CAPSULE, EXTENDED RELEASE ORAL at 18:04

## 2023-12-25 RX ADMIN — TRIAMCINOLONE ACETONIDE: 1 CREAM TOPICAL at 09:33

## 2023-12-25 RX ADMIN — SODIUM CHLORIDE, PRESERVATIVE FREE 10 ML: 5 INJECTION INTRAVENOUS at 22:17

## 2023-12-25 RX ADMIN — Medication 400 MG: at 22:16

## 2023-12-25 RX ADMIN — CARVEDILOL 25 MG: 12.5 TABLET, FILM COATED ORAL at 09:34

## 2023-12-25 RX ADMIN — Medication: at 22:13

## 2023-12-25 RX ADMIN — POTASSIUM CHLORIDE 10 MEQ: 20 TABLET, EXTENDED RELEASE ORAL at 22:12

## 2023-12-25 RX ADMIN — MICONAZOLE NITRATE: 20 CREAM TOPICAL at 22:17

## 2023-12-25 RX ADMIN — MICONAZOLE NITRATE: 20 CREAM TOPICAL at 09:33

## 2023-12-25 RX ADMIN — PREGABALIN 300 MG: 150 CAPSULE ORAL at 09:35

## 2023-12-25 RX ADMIN — BUDESONIDE 500 MCG: 0.5 INHALANT RESPIRATORY (INHALATION) at 09:07

## 2023-12-25 ASSESSMENT — PAIN DESCRIPTION - LOCATION: LOCATION: HEAD

## 2023-12-25 ASSESSMENT — PAIN DESCRIPTION - DESCRIPTORS: DESCRIPTORS: ACHING

## 2023-12-25 ASSESSMENT — PAIN SCALES - GENERAL: PAINLEVEL_OUTOF10: 1

## 2023-12-25 NOTE — PROGRESS NOTES
.End of Shift Note    Bedside shift change report given to Denisse Calvert (oncoming nurse) by Jamison Miller RN (offgoing nurse). Report included the following information SBAR, Kardex, Intake/Output, MAR, Recent Results, and Med Rec Status    Shift worked:  [id number]     Shift summary and any significant changes:     Pt given prescribed med's per MAR. Pt kept at semi fowlers position for wheezing. Pt given incentive spirometer and educated on use. Family at bedside. Caring rounds completed.      Concerns for physician to address:  none     Zone phone for oncoming shift:   [phone] , RN

## 2023-12-25 NOTE — PROGRESS NOTES
[facility]     Progress Note      12/25/2023 10:27 AM  NAME: Marilee Oakes   MRN:  [id number]   Admit Diagnosis: Fever and chills [R50.9]  Pleural effusion [J90]  Acute encephalopathy [G93.40]  Nausea and vomiting, unspecified vomiting type [R11.2]        Assessment:       Foot wound  Staph bacteremia     1) CAD (Prox LAD KAY 4/2014 for NQWMI), Neg PET for ischemia 9/2018. Min CAD 2/2019.  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  Entresto was too expensive.          *EF 15-20% 9/2018.  EF 30% w/ minimal CAD at cath 2/2019. EF 20-25% (m-mod MR; mod TR) 8/2019.          *EF 35-40% 8/2020 (admit for CHF: too much salt).         *EF 40-45% 2/2021 (@ [facility] for CHF, off some meds after kyphoplasty/low BPs)          *EF 50% 1/2022 (AS mild w/ mean 16).  3) HTN,   4) AFIB: PAfib (RFA 4/2016 and 1/2017), Recurrent/persistent afib 2017/2018: AV node ablation 10/2018.        *implant of the 24 mm Watchman device in the anterior chicken wing DEMARIO 10/2021.        *AC stopped 11/2021 after JUAN MANUEL showed no leak around Watchman device.  5) St Sudhakar PPM 7/2014 for bradycardia while on therapy for AFib: changed to BiV ICD 10/2018.  6) Dyslipidemia   ( labs per PCP).     7) CKD: Aldactone stopped in 2014. Cr 2 11/2018 (after mary): Cr 1.5 12/2018 & 8/2019;            *Cr 1.4/gfr 39 12/2020. Cr 1.9/gfr 26 3/2021 (avoiding ACEi/ARB). (Dr. Evangelista)  8) DM  9) nephrolithiasis  10) LEELA (on CPAP),   11) cholecystectomy 11/2018.  12) LE venous insufficiency:  B SFV ablation 3/2020.  Compression stockings/wraps changed to pump (1 hr bid).  Obesity: 262# 9/2019. 270 to 281# 2020. 279# 8/2020; 281# 12/2020. 257# 3/2021. 233# 2023.     4/2022: Medication list not provided today.   No anginal chest pain nor change in dyspnea.  Rare fleeting atypical chest pain.  No palpitations.  Home BP well controlled 120-130.     6/2023: No chest pain, dyspnea, or palpitations.  Using a walker.  Home  0.083% nebulizer solution 2.5 mg  2.5 mg Nebulization Q4H PRN    atorvastatin (LIPITOR) tablet 40 mg  40 mg Oral Nightly    bumetanide (BUMEX) tablet 2 mg  2 mg Oral Daily    carvedilol (COREG) tablet 25 mg  25 mg Oral BID with meals    clonazePAM (KLONOPIN) tablet 0.5 mg  0.5 mg Oral Nightly    isosorbide mononitrate (IMDUR) extended release tablet 60 mg  60 mg Oral Daily    magnesium oxide (MAG-OX) tablet 400 mg  400 mg Oral BID    pantoprazole (PROTONIX) tablet 40 mg  40 mg Oral QAM AC    pregabalin (LYRICA) capsule 300 mg  300 mg Oral Daily    sodium chloride (OCEAN) 0.65 % nasal spray 2 spray  2 spray Nasal Q8H PRN    venlafaxine (EFFEXOR XR) extended release capsule 150 mg  150 mg Oral BID WC    Vitamin D (CHOLECALCIFEROL) tablet 1,000 Units  1,000 Units Oral Daily    miconazole (MICOTIN) 2 % cream   Topical BID    triamcinolone (KENALOG) 0.1 % cream   Topical BID PRN    potassium chloride (KLOR-CON M) extended release tablet 10 mEq  10 mEq Oral 4x daily    clotrimazole-betamethasone (LOTRISONE) cream   Topical BID    sodium chloride flush 0.9 % injection 5-40 mL  5-40 mL IntraVENous 2 times per day    sodium chloride flush 0.9 % injection 5-40 mL  5-40 mL IntraVENous PRN    0.9 % sodium chloride infusion   IntraVENous PRN    potassium chloride (KLOR-CON M) extended release tablet 40 mEq  40 mEq Oral PRN    Or    potassium bicarb-citric acid (EFFER-K) effervescent tablet 40 mEq  40 mEq Oral PRN    Or    potassium chloride 10 mEq/100 mL IVPB (Peripheral Line)  10 mEq IntraVENous PRN    magnesium sulfate 2000 mg in 50 mL IVPB premix  2,000 mg IntraVENous PRN    enoxaparin Sodium (LOVENOX) injection 30 mg  30 mg SubCUTAneous BID    ondansetron (ZOFRAN-ODT) disintegrating tablet 4 mg  4 mg Oral Q8H PRN    Or    ondansetron (ZOFRAN) injection 4 mg  4 mg IntraVENous Q6H PRN    polyethylene glycol (GLYCOLAX) packet 17 g  17 g Oral Daily PRN    acetaminophen (TYLENOL) tablet 650 mg  650 mg Oral Q6H PRN    Or

## 2023-12-25 NOTE — PROGRESS NOTES
.End of Shift Note    Bedside shift change report given to [address]  (oncoming nurse) by Camilo Tamayo RN (offgoing nurse). Report included the following information SBAR, Kardex, Intake/Output, MAR, Recent Results, and Med Rec Status    Shift worked:  [id number]     Shift summary and any significant changes:     Pt given prescribed med's per MAR. Pt still wheezing called for resp for additional neb. Caring rounds completed.       Concerns for physician to address:  none     Zone phone for oncoming shift:   Timbo Navarrete RN

## 2023-12-25 NOTE — PROGRESS NOTES
Hospitalist Progress Note    NAME:   Laly Ramey   : 1950   MRN: [id number]     Date/Time: 2023 3:01 PM  Patient PCP: LIZZY Hernández - EMERALD    Estimated discharge date:   Barriers:Clearance of bacetermia, culture sensitivities, Transesophegeal echo      Assessment / Plan:    Nausea, vomiting and abdominal pain likely enteritis  -CT abdomen shows no acute process. -LFTs are within normal limits. Lipase is normal.  Total bilirubin is normal.  Troponin is normal  -She reports improvement of symptoms. Continue levofloxacin and Flagyl/Vancomycin. She has no diarrhea. Staph aureus bacteremia 2 x 2  Fever  Right foot ulcer  -Blood cultures from  are showing Staph aureus bacteremia in 2 sets. Continue Vancomycin for Abx coverage  -Follow up culture sensitivities  -Follow up Surveillance cultures  -Check CT of right foot due to right foot ulcer. Consult podiatry. Patient cannot have MRI due to pacemaker  -Follow repeat blood cultures  -Check CBC in a.m.  -ID consult appreciated, continue to follow recommmendations  -TTE results reviewed  -Pt to get Trans esophageal echo on , will follow up results     Systolic congestive heart failure-continue Bumex, Coreg  Hypertension, chronic  Hyperlipidemia, chronic  Copd/asthma overlap- requiring 2lpm o2, discharged 23 w/o2  MDD/FELIPA/insomnia, chronic  CKD 3, baseline, baseline cret 2019-present revealed 1.34-2.03.   GERD, chronic  Dermatitis, chronic, eczema, and candida pannus  CAD, h/o, presently asymptomatic  T2DM w/neuropathy chronic, w/o insulin use- diet controlled  Mild anemia- slightly improved since last check, asymptomatic, w/o reported bleeding per pt, recent iron profile wnl  LEELA compliant with cpap   -home meds resumed  -nocturnal cpap ordered, may use home cpap if available  -NF substitution Dulera  -duonebs prn  -NF substitution protonix      Medical Decision Making:   I personally reviewed labs: CBC, BMP  I

## 2023-12-26 ENCOUNTER — APPOINTMENT (OUTPATIENT)
Facility: HOSPITAL | Age: 73
DRG: 871 | End: 2023-12-26
Attending: INTERNAL MEDICINE
Payer: MEDICARE

## 2023-12-26 PROBLEM — I38 ENDOCARDITIS: Status: ACTIVE | Noted: 2023-12-26

## 2023-12-26 LAB
ANION GAP SERPL CALC-SCNC: 1 MMOL/L (ref 5–15)
BACTERIA SPEC CULT: NORMAL
BACTERIA SPEC CULT: NORMAL
BASOPHILS # BLD: 0 K/UL (ref 0–0.1)
BASOPHILS NFR BLD: 0 % (ref 0–1)
BUN SERPL-MCNC: 27 MG/DL (ref 6–20)
BUN/CREAT SERPL: 20 (ref 12–20)
CALCIUM SERPL-MCNC: 9.1 MG/DL (ref 8.5–10.1)
CHLORIDE SERPL-SCNC: 108 MMOL/L (ref 97–108)
CO2 SERPL-SCNC: 36 MMOL/L (ref 21–32)
CREAT SERPL-MCNC: 1.38 MG/DL (ref 0.55–1.02)
DIFFERENTIAL METHOD BLD: ABNORMAL
ECHO BSA: 2.18 M2
EOSINOPHIL # BLD: 0.3 K/UL (ref 0–0.4)
EOSINOPHIL NFR BLD: 10 % (ref 0–7)
ERYTHROCYTE [DISTWIDTH] IN BLOOD BY AUTOMATED COUNT: 16.2 % (ref 11.5–14.5)
GLUCOSE BLD STRIP.AUTO-MCNC: 110 MG/DL (ref 65–117)
GLUCOSE BLD STRIP.AUTO-MCNC: 124 MG/DL (ref 65–117)
GLUCOSE BLD STRIP.AUTO-MCNC: 96 MG/DL (ref 65–117)
GLUCOSE BLD STRIP.AUTO-MCNC: 98 MG/DL (ref 65–117)
GLUCOSE SERPL-MCNC: 116 MG/DL (ref 65–100)
HCT VFR BLD AUTO: 33.4 % (ref 35–47)
HGB BLD-MCNC: 9.5 G/DL (ref 11.5–16)
IMM GRANULOCYTES # BLD AUTO: 0 K/UL (ref 0–0.04)
IMM GRANULOCYTES NFR BLD AUTO: 1 % (ref 0–0.5)
LYMPHOCYTES # BLD: 0.5 K/UL (ref 0.8–3.5)
LYMPHOCYTES NFR BLD: 18 % (ref 12–49)
MCH RBC QN AUTO: 24.7 PG (ref 26–34)
MCHC RBC AUTO-ENTMCNC: 28.4 G/DL (ref 30–36.5)
MCV RBC AUTO: 87 FL (ref 80–99)
MONOCYTES # BLD: 0.2 K/UL (ref 0–1)
MONOCYTES NFR BLD: 6 % (ref 5–13)
NEUTS SEG # BLD: 1.9 K/UL (ref 1.8–8)
NEUTS SEG NFR BLD: 65 % (ref 32–75)
NRBC # BLD: 0 K/UL (ref 0–0.01)
NRBC BLD-RTO: 0 PER 100 WBC
NT PRO BNP: 1437 PG/ML
PLATELET # BLD AUTO: 156 K/UL (ref 150–400)
PMV BLD AUTO: 11.4 FL (ref 8.9–12.9)
POTASSIUM SERPL-SCNC: 4.5 MMOL/L (ref 3.5–5.1)
RBC # BLD AUTO: 3.84 M/UL (ref 3.8–5.2)
RBC MORPH BLD: ABNORMAL
SERVICE CMNT-IMP: ABNORMAL
SERVICE CMNT-IMP: NORMAL
SODIUM SERPL-SCNC: 145 MMOL/L (ref 136–145)
WBC # BLD AUTO: 2.9 K/UL (ref 3.6–11)

## 2023-12-26 PROCEDURE — 1100000000 HC RM PRIVATE

## 2023-12-26 PROCEDURE — 2580000003 HC RX 258: Performed by: NURSE PRACTITIONER

## 2023-12-26 PROCEDURE — 6360000002 HC RX W HCPCS: Performed by: NURSE PRACTITIONER

## 2023-12-26 PROCEDURE — 82962 GLUCOSE BLOOD TEST: CPT

## 2023-12-26 PROCEDURE — 6360000002 HC RX W HCPCS: Performed by: STUDENT IN AN ORGANIZED HEALTH CARE EDUCATION/TRAINING PROGRAM

## 2023-12-26 PROCEDURE — 99152 MOD SED SAME PHYS/QHP 5/>YRS: CPT

## 2023-12-26 PROCEDURE — 94640 AIRWAY INHALATION TREATMENT: CPT

## 2023-12-26 PROCEDURE — 96376 TX/PRO/DX INJ SAME DRUG ADON: CPT

## 2023-12-26 PROCEDURE — 6370000000 HC RX 637 (ALT 250 FOR IP): Performed by: INTERNAL MEDICINE

## 2023-12-26 PROCEDURE — 80048 BASIC METABOLIC PNL TOTAL CA: CPT

## 2023-12-26 PROCEDURE — 6360000002 HC RX W HCPCS: Performed by: INTERNAL MEDICINE

## 2023-12-26 PROCEDURE — 96366 THER/PROPH/DIAG IV INF ADDON: CPT

## 2023-12-26 PROCEDURE — 2580000003 HC RX 258: Performed by: INTERNAL MEDICINE

## 2023-12-26 PROCEDURE — C8925 2D TEE W OR W/O FOL W/CON,IN: HCPCS

## 2023-12-26 PROCEDURE — 83880 ASSAY OF NATRIURETIC PEPTIDE: CPT

## 2023-12-26 PROCEDURE — 2700000000 HC OXYGEN THERAPY PER DAY

## 2023-12-26 PROCEDURE — 2500000003 HC RX 250 WO HCPCS: Performed by: INTERNAL MEDICINE

## 2023-12-26 PROCEDURE — 36415 COLL VENOUS BLD VENIPUNCTURE: CPT

## 2023-12-26 PROCEDURE — 6370000000 HC RX 637 (ALT 250 FOR IP): Performed by: NURSE PRACTITIONER

## 2023-12-26 PROCEDURE — 96372 THER/PROPH/DIAG INJ SC/IM: CPT

## 2023-12-26 PROCEDURE — 94760 N-INVAS EAR/PLS OXIMETRY 1: CPT

## 2023-12-26 PROCEDURE — 94761 N-INVAS EAR/PLS OXIMETRY MLT: CPT

## 2023-12-26 PROCEDURE — 85025 COMPLETE CBC W/AUTO DIFF WBC: CPT

## 2023-12-26 RX ORDER — MIDAZOLAM HYDROCHLORIDE 1 MG/ML
INJECTION INTRAMUSCULAR; INTRAVENOUS PRN
Status: DISCONTINUED | OUTPATIENT
Start: 2023-12-26 | End: 2023-12-26

## 2023-12-26 RX ORDER — FENTANYL CITRATE 50 UG/ML
INJECTION, SOLUTION INTRAMUSCULAR; INTRAVENOUS PRN
Status: DISCONTINUED | OUTPATIENT
Start: 2023-12-26 | End: 2023-12-26

## 2023-12-26 RX ORDER — LIDOCAINE HYDROCHLORIDE 20 MG/ML
SOLUTION OROPHARYNGEAL PRN
Status: DISCONTINUED | OUTPATIENT
Start: 2023-12-26 | End: 2023-12-26

## 2023-12-26 RX ADMIN — VANCOMYCIN HYDROCHLORIDE 1000 MG: 1 INJECTION, POWDER, LYOPHILIZED, FOR SOLUTION INTRAVENOUS at 14:28

## 2023-12-26 RX ADMIN — POTASSIUM CHLORIDE 10 MEQ: 20 TABLET, EXTENDED RELEASE ORAL at 21:00

## 2023-12-26 RX ADMIN — VENLAFAXINE HYDROCHLORIDE 150 MG: 150 CAPSULE, EXTENDED RELEASE ORAL at 18:31

## 2023-12-26 RX ADMIN — ENOXAPARIN SODIUM 30 MG: 100 INJECTION SUBCUTANEOUS at 21:00

## 2023-12-26 RX ADMIN — FENTANYL CITRATE 25 MCG: 50 INJECTION, SOLUTION INTRAMUSCULAR; INTRAVENOUS at 11:33

## 2023-12-26 RX ADMIN — ARFORMOTEROL TARTRATE 15 MCG: 15 SOLUTION RESPIRATORY (INHALATION) at 07:23

## 2023-12-26 RX ADMIN — Medication 1000 UNITS: at 10:14

## 2023-12-26 RX ADMIN — CARVEDILOL 25 MG: 12.5 TABLET, FILM COATED ORAL at 10:14

## 2023-12-26 RX ADMIN — ARFORMOTEROL TARTRATE 15 MCG: 15 SOLUTION RESPIRATORY (INHALATION) at 20:59

## 2023-12-26 RX ADMIN — PANTOPRAZOLE SODIUM 40 MG: 40 TABLET, DELAYED RELEASE ORAL at 09:00

## 2023-12-26 RX ADMIN — CARVEDILOL 25 MG: 12.5 TABLET, FILM COATED ORAL at 18:31

## 2023-12-26 RX ADMIN — ENOXAPARIN SODIUM 30 MG: 100 INJECTION SUBCUTANEOUS at 10:09

## 2023-12-26 RX ADMIN — VENLAFAXINE HYDROCHLORIDE 150 MG: 150 CAPSULE, EXTENDED RELEASE ORAL at 10:13

## 2023-12-26 RX ADMIN — CLOTRIMAZOLE AND BETAMETHASONE DIPROPIONATE: 10; .5 CREAM TOPICAL at 21:04

## 2023-12-26 RX ADMIN — BENZOCAINE, BUTAMBEN, AND TETRACAINE HYDROCHLORIDE 1 SPRAY: .028; .004; .004 AEROSOL, SPRAY TOPICAL at 11:27

## 2023-12-26 RX ADMIN — BUDESONIDE 500 MCG: 0.5 INHALANT RESPIRATORY (INHALATION) at 07:23

## 2023-12-26 RX ADMIN — SODIUM CHLORIDE: 9 INJECTION, SOLUTION INTRAVENOUS at 08:05

## 2023-12-26 RX ADMIN — BUDESONIDE 500 MCG: 0.5 INHALANT RESPIRATORY (INHALATION) at 20:59

## 2023-12-26 RX ADMIN — ASPIRIN 81 MG: 81 TABLET, CHEWABLE ORAL at 10:15

## 2023-12-26 RX ADMIN — CLONAZEPAM 0.5 MG: 0.5 TABLET ORAL at 21:00

## 2023-12-26 RX ADMIN — MICONAZOLE NITRATE: 20 CREAM TOPICAL at 10:27

## 2023-12-26 RX ADMIN — Medication 1 CAPSULE: at 13:46

## 2023-12-26 RX ADMIN — ISOSORBIDE MONONITRATE 60 MG: 30 TABLET, EXTENDED RELEASE ORAL at 10:15

## 2023-12-26 RX ADMIN — Medication 400 MG: at 10:13

## 2023-12-26 RX ADMIN — BUMETANIDE 2 MG: 0.25 INJECTION, SOLUTION INTRAMUSCULAR; INTRAVENOUS at 10:33

## 2023-12-26 RX ADMIN — Medication: at 10:40

## 2023-12-26 RX ADMIN — SODIUM CHLORIDE, PRESERVATIVE FREE 10 ML: 5 INJECTION INTRAVENOUS at 21:05

## 2023-12-26 RX ADMIN — MICONAZOLE NITRATE: 20 CREAM TOPICAL at 21:03

## 2023-12-26 RX ADMIN — Medication 400 MG: at 21:01

## 2023-12-26 RX ADMIN — PREGABALIN 300 MG: 150 CAPSULE ORAL at 10:13

## 2023-12-26 RX ADMIN — SODIUM CHLORIDE, PRESERVATIVE FREE 10 ML: 5 INJECTION INTRAVENOUS at 10:29

## 2023-12-26 RX ADMIN — LIDOCAINE HYDROCHLORIDE 10 ML: 20 SOLUTION ORAL at 11:24

## 2023-12-26 RX ADMIN — POTASSIUM CHLORIDE 10 MEQ: 20 TABLET, EXTENDED RELEASE ORAL at 18:30

## 2023-12-26 RX ADMIN — MIDAZOLAM HYDROCHLORIDE 1 MG: 1 INJECTION, SOLUTION INTRAMUSCULAR; INTRAVENOUS at 11:33

## 2023-12-26 RX ADMIN — ATORVASTATIN CALCIUM 40 MG: 40 TABLET, FILM COATED ORAL at 21:00

## 2023-12-26 RX ADMIN — Medication: at 21:03

## 2023-12-26 RX ADMIN — POTASSIUM CHLORIDE 10 MEQ: 20 TABLET, EXTENDED RELEASE ORAL at 13:46

## 2023-12-26 RX ADMIN — CLOTRIMAZOLE AND BETAMETHASONE DIPROPIONATE: 10; .5 CREAM TOPICAL at 10:30

## 2023-12-26 RX ADMIN — POTASSIUM CHLORIDE 10 MEQ: 20 TABLET, EXTENDED RELEASE ORAL at 10:14

## 2023-12-26 NOTE — PROGRESS NOTES
Hospitalist Progress Note    NAME:   Cintia Jaimes   : 1950   MRN: [id number]     Date/Time: 2023 1:00 PM  Patient PCP: LIZZY Echeverria NP    Estimated discharge date:   Barriers:Clearance of bacetermia, culture sensitivities      Assessment / Plan:    Nausea, vomiting and abdominal pain likely enteritis  -CT abdomen shows no acute process. -LFTs are within normal limits. Lipase is normal.  Total bilirubin is normal.  Troponin is normal  -She reports improvement of symptoms. Continue levofloxacin and Flagyl/Vancomycin. She has no diarrhea. Staph aureus bacteremia 2 x 2  Fever  Right foot ulcer  -Blood cultures from  are showing Staph aureus bacteremia in 2 sets. Continue Vancomycin for Abx coverage  -Follow up culture sensitivities  -Follow up Surveillance cultures  -Check CT of right foot due to right foot ulcer. Consult podiatry. Patient cannot have MRI due to pacemaker  -Follow repeat blood cultures  -Check CBC in a.m.  -ID consult appreciated, continue to follow recommmendations  -TTE results reviewed  -JUAN MANUEL negative for IE     Systolic congestive heart failure-continue Bumex, Coreg  Hypertension, chronic  Hyperlipidemia, chronic  Copd/asthma overlap- requiring 2lpm o2, discharged 23 w/o2  MDD/FELIPA/insomnia, chronic  CKD 3, baseline, baseline cret 2019-present revealed 1.34-2.03.   GERD, chronic  Dermatitis, chronic, eczema, and candida pannus  CAD, h/o, presently asymptomatic  T2DM w/neuropathy chronic, w/o insulin use- diet controlled  Mild anemia- slightly improved since last check, asymptomatic, w/o reported bleeding per pt, recent iron profile wnl  LEEAL compliant with cpap   -home meds resumed  -nocturnal cpap ordered, may use home cpap if available  -NF substitution Dulera  -duonebs prn  -NF substitution protonix      Medical Decision Making:   I personally reviewed labs: CBC, BMP  I personally reviewed imaging: =  I personally reviewed EKG:EKG telemetry strip - paced rhythm  Toxic drug monitoring: Monitor creatinine while on vancomycin  Discussed case with: Case Management in IDR's        Code Status: Full code  DVT Prophylaxis: Lovenox  GI Prophylaxis:    Subjective:     Chief Complaint / Reason for Physician Visit  Patient seen and evaluated at bedside, patient currently has no new complaints, overnight events reviewed, discussed with RN      Objective:     VITALS:   Last 24hrs VS reviewed since prior progress note. Most recent are:  Patient Vitals for the past 24 hrs:   BP Temp Temp src Pulse Resp SpO2   12/26/23 1235 112/68 -- -- 86 21 94 %   12/26/23 1230 118/72 -- -- 86 21 95 %   12/26/23 1225 121/65 -- -- 82 29 95 %   12/26/23 1220 118/72 -- -- 80 24 96 %   12/26/23 1215 113/64 -- -- 80 21 94 %   12/26/23 1210 119/66 -- -- 80 16 95 %   12/26/23 1205 104/63 -- -- 80 21 93 %   12/26/23 1200 (!) 117/59 -- -- 80 21 92 %   12/26/23 1155 116/65 -- -- 86 23 93 %   12/26/23 1150 122/68 -- -- 80 23 95 %   12/26/23 1145 (!) 105/56 -- -- 86 24 95 %   12/26/23 1140 103/61 -- -- 80 26 93 %   12/26/23 1135 (!) 105/59 -- -- 80 20 96 %   12/26/23 1125 (!) 143/78 -- -- 80 23 94 %   12/26/23 1033 (!) 143/90 -- -- -- -- --   12/26/23 1014 (!) 148/83 -- -- 80 -- --   12/26/23 0823 (!) 148/83 98.4 °F (36.9 °C) Oral 80 18 92 %   12/26/23 0723 -- -- -- 81 18 95 %   12/26/23 0331 (!) 147/91 98.8 °F (37.1 °C) -- 80 -- 95 %   12/26/23 0045 -- -- -- 80 20 --   12/25/23 2251 -- -- -- 80 20 97 %   12/25/23 1510 107/66 97.5 °F (36.4 °C) Oral 82 17 97 %           Intake/Output Summary (Last 24 hours) at 12/26/2023 1300  Last data filed at 12/25/2023 2002  Gross per 24 hour   Intake --   Output 1575 ml   Net -1575 ml          I had a face to face encounter and independently examined this patient on 12/26/2023, as outlined below:  PHYSICAL EXAM:  General: Alert, cooperative  EENT:  EOMI. Anicteric sclerae.  Resp:  CTA bilaterally, no wheezing or rales.  No accessory muscle

## 2023-12-26 NOTE — PROGRESS NOTES
TRANSFER - OUT REPORT:    Verbal report given to AGNES Block(name) on Mary Briggs being transferred to Oncology(unit) for routine progression of patient care       Report consisted of patient's Situation, Background, Assessment and   Recommendations(SBAR). Information from the following report(s) MAR and Recent Results was reviewed with the receiving nurse. Opportunity for questions and clarification was provided.       Patient transported with:   O2 @ 2 liters  Registered Nurse

## 2023-12-26 NOTE — PROGRESS NOTES
Patient arrived to Non-Invasive Cardiology Lab for In Patient JUAN MANUEL Procedure. Staff introduced to patient. Patient identifiers verified with Name and Date of Birth. Procedure verified with patient. Consent forms reviewed and signed by patient or authorized representative and verified. Allergies verified. Patient informed of procedure and plan of care. Questions answered with review. Patient on cardiac monitor, non-invasive blood pressure, SPO2 monitor. On 2 L n/c. Patient is A&Ox3. Patient reports no complaints. Patient on stretcher, in low position, with side rails up. Patient instructed to call for assistance as needed.

## 2023-12-26 NOTE — PROGRESS NOTES
New order acknowledged and chart reviewed. Patient cleared by nursing to mobilize. The patient was unable to stay awake when attempt was made. She may still be recovering from sedation from earlier JUAN MANUEL. Session aborted and PT will follow up tomorrow.     Kvein Fraga, PT

## 2023-12-26 NOTE — PROGRESS NOTES
[facility]     Progress Note      12/26/2023 12:30 PM  NAME: Estuardo Martinez   MRN:  [id number]   Admit Diagnosis: Fever and chills [R50.9]  Pleural effusion [J90]  Acute encephalopathy [G93.40]  Nausea and vomiting, unspecified vomiting type [R11.2]        Assessment:       Foot wound  Staph bacteremia     1) CAD (Prox LAD KAY 4/2014 for NQWMI), Neg PET for ischemia 9/2018. Min CAD 2/2019.  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 Chiki Garb was too expensive. *EF 15-20% 9/2018. EF 30% w/ minimal CAD at cath 2/2019. EF 20-25% (m-mod MR; mod TR) 8/2019. *EF 35-40% 8/2020 (admit for CHF: too much salt). *EF 40-45% 2/2021 (@ [facility] for CHF, off some meds after kyphoplasty/low BPs)          *EF 50% 1/2022 (AS mild w/ mean 16). 3) HTN,   4) AFIB: PAfib (RFA 4/2016 and 1/2017), Recurrent/persistent afib 2017/2018: AV node ablation 10/2018. *implant of the 24 mm Watchman device in the anterior chicken wing DEMARIO 10/2021. *AC stopped 11/2021 after JUAN MANUEL showed no leak around Watchman device. 5) St Sudhakar PPM 7/2014 for bradycardia while on therapy for AFib: changed to BiV ICD 10/2018.  6) Dyslipidemia   ( labs per PCP). 7) CKD: Aldactone stopped in 2014. Cr 2 11/2018 (after mary): Cr 1.5 12/2018 & 8/2019;            *Cr 1.4/gfr 39 12/2020. Cr 1.9/gfr 26 3/2021 (avoiding ACEi/ARB). (Dr. Terrance Finch)  8) DM  9) nephrolithiasis  10) LEELA (on CPAP),   11) cholecystectomy 11/2018.  12) LE venous insufficiency:  B SFV ablation 3/2020. Compression stockings/wraps changed to pump (1 hr bid). Obesity: 262# 9/2019. 270 to 281# 2020. 279# 8/2020; 281# 12/2020. 257# 3/2021. 233# 2023.     4/2022: Medication list not provided today. No anginal chest pain nor change in dyspnea. Rare fleeting atypical chest pain. No palpitations. Home BP well controlled 120-130.     6/2023: No chest pain, dyspnea, or palpitations. Using a walker.   Home

## 2023-12-26 NOTE — PROGRESS NOTES
Pt sedated with 1mg Versed and 25mcg Fentanyl for JUAN MANUEL (monitored sedation from 1130 to 1150)

## 2023-12-27 LAB
ANION GAP SERPL CALC-SCNC: ABNORMAL MMOL/L (ref 5–15)
BASOPHILS # BLD: 0 K/UL (ref 0–0.1)
BASOPHILS NFR BLD: 0 % (ref 0–1)
BUN SERPL-MCNC: 24 MG/DL (ref 6–20)
BUN/CREAT SERPL: 19 (ref 12–20)
CALCIUM SERPL-MCNC: 9 MG/DL (ref 8.5–10.1)
CHLORIDE SERPL-SCNC: 109 MMOL/L (ref 97–108)
CO2 SERPL-SCNC: 36 MMOL/L (ref 21–32)
CREAT SERPL-MCNC: 1.28 MG/DL (ref 0.55–1.02)
DIFFERENTIAL METHOD BLD: ABNORMAL
EOSINOPHIL # BLD: 0.2 K/UL (ref 0–0.4)
EOSINOPHIL NFR BLD: 6 % (ref 0–7)
ERYTHROCYTE [DISTWIDTH] IN BLOOD BY AUTOMATED COUNT: 16.1 % (ref 11.5–14.5)
GLUCOSE BLD STRIP.AUTO-MCNC: 109 MG/DL (ref 65–117)
GLUCOSE BLD STRIP.AUTO-MCNC: 111 MG/DL (ref 65–117)
GLUCOSE BLD STRIP.AUTO-MCNC: 123 MG/DL (ref 65–117)
GLUCOSE BLD STRIP.AUTO-MCNC: 164 MG/DL (ref 65–117)
GLUCOSE SERPL-MCNC: 120 MG/DL (ref 65–100)
HCT VFR BLD AUTO: 32.8 % (ref 35–47)
HGB BLD-MCNC: 9.5 G/DL (ref 11.5–16)
IMM GRANULOCYTES # BLD AUTO: 0 K/UL (ref 0–0.04)
IMM GRANULOCYTES NFR BLD AUTO: 0 % (ref 0–0.5)
LYMPHOCYTES # BLD: 0.5 K/UL (ref 0.8–3.5)
LYMPHOCYTES NFR BLD: 20 % (ref 12–49)
MCH RBC QN AUTO: 25.3 PG (ref 26–34)
MCHC RBC AUTO-ENTMCNC: 29 G/DL (ref 30–36.5)
MCV RBC AUTO: 87.2 FL (ref 80–99)
METAMYELOCYTES NFR BLD MANUAL: 1 %
MONOCYTES # BLD: 0.2 K/UL (ref 0–1)
MONOCYTES NFR BLD: 7 % (ref 5–13)
NEUTS SEG # BLD: 1.8 K/UL (ref 1.8–8)
NEUTS SEG NFR BLD: 66 % (ref 32–75)
NRBC # BLD: 0 K/UL (ref 0–0.01)
NRBC BLD-RTO: 0 PER 100 WBC
PLATELET # BLD AUTO: 159 K/UL (ref 150–400)
PMV BLD AUTO: 11.3 FL (ref 8.9–12.9)
POTASSIUM SERPL-SCNC: 4 MMOL/L (ref 3.5–5.1)
RBC # BLD AUTO: 3.76 M/UL (ref 3.8–5.2)
RBC MORPH BLD: ABNORMAL
SERVICE CMNT-IMP: ABNORMAL
SERVICE CMNT-IMP: ABNORMAL
SERVICE CMNT-IMP: NORMAL
SERVICE CMNT-IMP: NORMAL
SODIUM SERPL-SCNC: 143 MMOL/L (ref 136–145)
WBC # BLD AUTO: 2.7 K/UL (ref 3.6–11)
WBC MORPH BLD: ABNORMAL

## 2023-12-27 PROCEDURE — 2500000003 HC RX 250 WO HCPCS: Performed by: INTERNAL MEDICINE

## 2023-12-27 PROCEDURE — 36415 COLL VENOUS BLD VENIPUNCTURE: CPT

## 2023-12-27 PROCEDURE — 94760 N-INVAS EAR/PLS OXIMETRY 1: CPT

## 2023-12-27 PROCEDURE — 6360000002 HC RX W HCPCS: Performed by: NURSE PRACTITIONER

## 2023-12-27 PROCEDURE — 2700000000 HC OXYGEN THERAPY PER DAY

## 2023-12-27 PROCEDURE — 80048 BASIC METABOLIC PNL TOTAL CA: CPT

## 2023-12-27 PROCEDURE — 97535 SELF CARE MNGMENT TRAINING: CPT

## 2023-12-27 PROCEDURE — 2580000003 HC RX 258: Performed by: NURSE PRACTITIONER

## 2023-12-27 PROCEDURE — 6370000000 HC RX 637 (ALT 250 FOR IP): Performed by: NURSE PRACTITIONER

## 2023-12-27 PROCEDURE — 6370000000 HC RX 637 (ALT 250 FOR IP): Performed by: INTERNAL MEDICINE

## 2023-12-27 PROCEDURE — 97116 GAIT TRAINING THERAPY: CPT

## 2023-12-27 PROCEDURE — 85025 COMPLETE CBC W/AUTO DIFF WBC: CPT

## 2023-12-27 PROCEDURE — 1100000000 HC RM PRIVATE

## 2023-12-27 PROCEDURE — 94640 AIRWAY INHALATION TREATMENT: CPT

## 2023-12-27 PROCEDURE — 6360000002 HC RX W HCPCS: Performed by: INTERNAL MEDICINE

## 2023-12-27 PROCEDURE — 82962 GLUCOSE BLOOD TEST: CPT

## 2023-12-27 PROCEDURE — 2580000003 HC RX 258: Performed by: INTERNAL MEDICINE

## 2023-12-27 PROCEDURE — 6360000002 HC RX W HCPCS: Performed by: STUDENT IN AN ORGANIZED HEALTH CARE EDUCATION/TRAINING PROGRAM

## 2023-12-27 RX ORDER — CASTOR OIL AND BALSAM, PERU 788; 87 MG/G; MG/G
OINTMENT TOPICAL 2 TIMES DAILY
Qty: 28.35 G | Refills: 0 | Status: SHIPPED | OUTPATIENT
Start: 2023-12-27

## 2023-12-27 RX ORDER — DIPHENHYDRAMINE HCL 25 MG
25 CAPSULE ORAL ONCE
Status: COMPLETED | OUTPATIENT
Start: 2023-12-27 | End: 2023-12-27

## 2023-12-27 RX ORDER — ARFORMOTEROL TARTRATE 15 UG/2ML
15 SOLUTION RESPIRATORY (INHALATION)
Qty: 120 ML | Refills: 3 | Status: SHIPPED | OUTPATIENT
Start: 2023-12-27

## 2023-12-27 RX ORDER — ASPIRIN 81 MG/1
81 TABLET, CHEWABLE ORAL DAILY
Qty: 30 TABLET | Refills: 3 | Status: SHIPPED | OUTPATIENT
Start: 2023-12-28

## 2023-12-27 RX ORDER — BUDESONIDE 0.5 MG/2ML
0.5 INHALANT ORAL
Qty: 60 EACH | Refills: 3 | Status: SHIPPED | OUTPATIENT
Start: 2023-12-27

## 2023-12-27 RX ADMIN — Medication 400 MG: at 10:49

## 2023-12-27 RX ADMIN — VENLAFAXINE HYDROCHLORIDE 150 MG: 150 CAPSULE, EXTENDED RELEASE ORAL at 19:32

## 2023-12-27 RX ADMIN — BUDESONIDE 500 MCG: 0.5 INHALANT RESPIRATORY (INHALATION) at 19:46

## 2023-12-27 RX ADMIN — Medication 1000 UNITS: at 10:49

## 2023-12-27 RX ADMIN — Medication 1 CAPSULE: at 15:14

## 2023-12-27 RX ADMIN — PREGABALIN 300 MG: 150 CAPSULE ORAL at 10:48

## 2023-12-27 RX ADMIN — POTASSIUM CHLORIDE 10 MEQ: 20 TABLET, EXTENDED RELEASE ORAL at 22:11

## 2023-12-27 RX ADMIN — CLOTRIMAZOLE AND BETAMETHASONE DIPROPIONATE: 10; .5 CREAM TOPICAL at 11:07

## 2023-12-27 RX ADMIN — Medication: at 11:07

## 2023-12-27 RX ADMIN — POTASSIUM CHLORIDE 10 MEQ: 20 TABLET, EXTENDED RELEASE ORAL at 15:14

## 2023-12-27 RX ADMIN — ARFORMOTEROL TARTRATE 15 MCG: 15 SOLUTION RESPIRATORY (INHALATION) at 08:55

## 2023-12-27 RX ADMIN — Medication: at 22:07

## 2023-12-27 RX ADMIN — ENOXAPARIN SODIUM 30 MG: 100 INJECTION SUBCUTANEOUS at 10:51

## 2023-12-27 RX ADMIN — CLOTRIMAZOLE AND BETAMETHASONE DIPROPIONATE: 10; .5 CREAM TOPICAL at 22:08

## 2023-12-27 RX ADMIN — ARFORMOTEROL TARTRATE 15 MCG: 15 SOLUTION RESPIRATORY (INHALATION) at 19:46

## 2023-12-27 RX ADMIN — VENLAFAXINE HYDROCHLORIDE 150 MG: 150 CAPSULE, EXTENDED RELEASE ORAL at 10:49

## 2023-12-27 RX ADMIN — ASPIRIN 81 MG: 81 TABLET, CHEWABLE ORAL at 10:49

## 2023-12-27 RX ADMIN — MICONAZOLE NITRATE: 20 CREAM TOPICAL at 22:07

## 2023-12-27 RX ADMIN — MICONAZOLE NITRATE: 20 CREAM TOPICAL at 11:05

## 2023-12-27 RX ADMIN — CARVEDILOL 25 MG: 12.5 TABLET, FILM COATED ORAL at 19:31

## 2023-12-27 RX ADMIN — POTASSIUM CHLORIDE 10 MEQ: 20 TABLET, EXTENDED RELEASE ORAL at 10:50

## 2023-12-27 RX ADMIN — SODIUM CHLORIDE, PRESERVATIVE FREE 10 ML: 5 INJECTION INTRAVENOUS at 22:12

## 2023-12-27 RX ADMIN — POTASSIUM CHLORIDE 10 MEQ: 20 TABLET, EXTENDED RELEASE ORAL at 19:31

## 2023-12-27 RX ADMIN — BUDESONIDE 500 MCG: 0.5 INHALANT RESPIRATORY (INHALATION) at 08:55

## 2023-12-27 RX ADMIN — BUMETANIDE 2 MG: 0.25 INJECTION, SOLUTION INTRAMUSCULAR; INTRAVENOUS at 10:50

## 2023-12-27 RX ADMIN — DIPHENHYDRAMINE HYDROCHLORIDE 25 MG: 25 CAPSULE ORAL at 19:32

## 2023-12-27 RX ADMIN — SODIUM CHLORIDE, PRESERVATIVE FREE 10 ML: 5 INJECTION INTRAVENOUS at 10:56

## 2023-12-27 RX ADMIN — CLONAZEPAM 0.5 MG: 0.5 TABLET ORAL at 22:01

## 2023-12-27 RX ADMIN — PANTOPRAZOLE SODIUM 40 MG: 40 TABLET, DELAYED RELEASE ORAL at 06:43

## 2023-12-27 RX ADMIN — ENOXAPARIN SODIUM 30 MG: 100 INJECTION SUBCUTANEOUS at 22:33

## 2023-12-27 RX ADMIN — Medication 400 MG: at 22:01

## 2023-12-27 RX ADMIN — ISOSORBIDE MONONITRATE 60 MG: 30 TABLET, EXTENDED RELEASE ORAL at 10:50

## 2023-12-27 RX ADMIN — CARVEDILOL 25 MG: 12.5 TABLET, FILM COATED ORAL at 10:48

## 2023-12-27 RX ADMIN — DAPTOMYCIN 700 MG: 500 INJECTION, POWDER, LYOPHILIZED, FOR SOLUTION INTRAVENOUS at 15:23

## 2023-12-27 NOTE — DISCHARGE SUMMARY
Hospitalist Discharge Summary     Patient ID:  Marilee Oakes  [id number]  73 y.o.  1950 12/18/2023    PCP on record: Parish Dumont APRN - NP    Admit date: 12/18/2023  Discharge date and time: 12/27/2023    DISCHARGE DIAGNOSIS:    Nausea vomiting/abdominal pain/enteritis/Staph aureus bacteremia/fever/right foot ulcer/acute decompensated systolic heart failure/hypertension/hyperlipidemia/COPD/asthma/major depressive disorder/generalized anxiety disorder/chronic kidney disease stage III/gastroesophageal reflux disease/dermatitis/coronary artery disease/type 2 diabetes/mild anemia/obstructive sleep apnea on CPAP    CONSULTATIONS:  IP CONSULT TO HOSPITALIST  IP CONSULT TO SOCIAL WORK  IP CONSULT TO CASE MANAGEMENT  IP WOUND CARE NURSE CONSULT TO EVAL  IP CONSULT TO PHARMACY  IP CONSULT TO INFECTIOUS DISEASES  IP CONSULT TO PODIATRY  IP CONSULT TO CARDIOLOGY  IP CONSULT TO CASE MANAGEMENT  IP CONSULT TO PHARMACY  IP CONSULT TO OCCUPATIONAL THERAPY  IP CONSULT TO PHYSICAL THERAPY    Excerpted HPI from H&P of Grazyna Lim MD:  Marilee Oakes is a 73 y.o.  female with PMHx as listed below presenting to the emergency department with complaints of progressively worsening shortness of breath over the past 24-48 hours not associated with URI symptoms or cough.  Denies sick contacts.  Reports slight increase in bilateral lower extremity edema and interval development of orthopnea.  ROS otherwise negative.  Denies tobacco, alcohol, illicit drugs.     In the ED, patient afebrile and hemodynamically stable (hypertensive 160s/80s), saturating mid 90s on 2 L/min via nasal cannula desaturating to upper 80s on room air.  CXR negative for acute process.  COVID and flu negative.  Labs demonstrate: proBNP 3022, magnesium 1.7, sodium 145, potassium 3.4, glucose 133, BUN 21, creatinine 1.48 (baseline), LFTs grossly unremarkable, WBC 4.8, hemoglobin 10.0-MCV 88.7, platelets 144, high sensitive troponin 28.  evening.     sodium chloride 0.9 % SOLN 50 mL with DAPTOmycin 500 MG SOLR 700 mg  Infuse 700 mg intravenously every 24 hours for 21 days            CONTINUE taking these medications      acetaminophen 500 MG tablet  Commonly known as: TYLENOL     albuterol sulfate  (90 Base) MCG/ACT inhaler  Commonly known as: PROVENTIL;VENTOLIN;PROAIR     atorvastatin 40 MG tablet  Commonly known as: LIPITOR     budesonide-formoterol 160-4.5 MCG/ACT Aero  Commonly known as: SYMBICORT     bumetanide 2 MG tablet  Commonly known as: BUMEX  TAKE 1 TABLET EVERY DAY     carvedilol 25 MG tablet  Commonly known as: COREG  TAKE 1 TABLET TWICE DAILY WITH MEALS     clonazePAM 0.5 MG tablet  Commonly known as: KLONOPIN  TAKE 1 TABLET BY MOUTH NIGHTLY . DO NOT EXCEED 1 PER 24 HOURS     clotrimazole-betamethasone 1-0.05 % cream  Commonly known as: LOTRISONE     isosorbide mononitrate 60 MG extended release tablet  Commonly known as: IMDUR  TAKE 1 TABLET EVERY DAY     magnesium oxide 400 MG tablet  Commonly known as: MAG-OX     nystatin 418572 UNIT/GM cream  Commonly known as: MYCOSTATIN  Apply topically 2 times daily.      omeprazole 40 MG delayed release capsule  Commonly known as: PRILOSEC  TAKE 1 CAPSULE EVERY DAY     potassium chloride 10 MEQ extended release tablet  Commonly known as: KLOR-CON M  TAKE 1 TABLET FOUR TIMES DAILY     pregabalin 300 MG capsule  Commonly known as: LYRICA     sodium chloride 0.65 % nasal spray  Commonly known as: OCEAN     triamcinolone 0.1 % cream  Commonly known as: KENALOG  APPLY A THIN FILM OF CREAM EXTERNALLY TO THE AFFECTED AREA TWICE DAILY     venlafaxine 150 MG extended release capsule  Commonly known as: EFFEXOR XR     vitamin D 25 MCG (1000 UT) Caps               Where to Get Your Medications        These medications were sent to [address] [place] [address] [phone] - F [phone] [address]      Phone:

## 2023-12-27 NOTE — PLAN OF CARE
Impaired  Balance  Sitting: Intact  Standing: Impaired  Standing - Static: Fair;Constant support    ADL Intervention:    Feeding: Independent    LE Dressing: Contact guard assistance    Pain Ratin/10   Pain Intervention(s):   pain is at a level acceptable to the patient    Activity Tolerance:   Fair  and signs and symptoms of orthostatic hypotension  Please refer to the flowsheet for vital signs taken during this treatment. After treatment:   Patient left in no apparent distress sitting up in chair, Call bell within reach, Bed/ chair alarm activated, and Caregiver / family present    COMMUNICATION/EDUCATION:   The patient's plan of care was discussed with: physical therapist and registered nurse    Patient Education  Education Given To: Patient  Education Provided: ADL Adaptive Strategies;Transfer Training; Fall Prevention Strategies  Education Method: Verbal  Barriers to Learning: None  Education Outcome: Verbalized understanding    Thank you for this referral.  Veronika Romero OT  Minutes: 17

## 2023-12-27 NOTE — CARE COORDINATION
Transition of Care Plan:    RUR: 20%  Prior Level of Functioning: Needs assistance with ADLs  Disposition: SNF-[facility] Care   Follow up appointments: Follow up with PCP and/or Specialist   DME needed: rolling walker   Transportation at discharge: Family to assist   IM/IMM Medicare/ letter given: 2nd IM Medicare Letter   Is patient a [place] and connected with [facility]? N/A   If yes, was [place] transfer form completed and [facility] notified? N/A  Caregiver Contact: Luis Alberto Lu (spouse) [phone]  Discharge Caregiver contacted prior to discharge? Family to be contacted   Care Conference needed? Not at this time   Barriers to discharge: SNF Placement       CM: Zoltan Conde is currently working with pt in the [address] [facility]. Unit. CM aware that pt has d/c orders in place. Pt is being recommended for IV abx and HHC (pt/ot/sn) at the time of d/c.      CM staffed case with pt and spouse at bedside, regarding the following. Pt and spouse had wanted to discuss options: HHC or SNF. Pt and spouse selected for decided upon [address] [place] [address] (snf) at the time of d/c. CM will place referral for SNF, with potential acceptance on 12/28/23. CM informed physician of the following.     CHERYLE Garay CM  [phone]

## 2023-12-27 NOTE — PROGRESS NOTES
Physician Progress Note      PATIENT:               Thai Cedeno  CSN #:                  [id number]  :                       1950  ADMIT DATE:       2023 7:43 PM  [address] [facility] DATE:  Jorge Thomas  PROVIDER #:        Louis Meza MD          QUERY TEXT:    Dr Levi Washington  Pt admitted with N/V/enteritis/foot ulcer. Pt noted to have lethargic, WBC   5-2.7, PCT <0.05, lactic acid 0.79, Staph aureus bacteremia 2 x 2. If   possible, please document in the progress notes and discharge summary if you   are evaluating and /or treating any of the following: The medical record reflects the following:  Risk Factors: DM, CHF  Clinical Indicators: Staph aureus bacteremia 2 x 2, lethargic, AMS, Temp   101.0-100.8; WBC 5.7-3.2-3.1-2.7; PCT <0.05; LA (POC) 0.79; PLTS   [phone]  Treatment: Levaquin, Flagyl, Vancomycin  Options provided:  -- Sepsis, present on admission  -- Sepsis, present on admission, now resolved  -- Sepsis, developed following admission  -- enteritis/foot ulcer without Sepsis  -- Sepsis was ruled out  -- Other - I will add my own diagnosis  -- Disagree - Not applicable / Not valid  -- Disagree - Clinically unable to determine / Unknown  -- Refer to Clinical Documentation Reviewer    PROVIDER RESPONSE TEXT:    This patient was treated for sepsis this admission, which was present on   admission and is currently resolved. Query created by: Hannah Cranker on 2023 10:06 AM      QUERY TEXT:    Dr Maria C Patel  Pt admitted with N/V/Eneteriits and has systolic CHF documented. If possible,   please document in progress notes and discharge summary further specificity   regarding the type and acuity of CHF:    The medical record reflects the following:  Risk Factors: CHF, DM  Clinical Indicators: Mirna Sinclair [id number]; -XRAY; Cardiomegaly and   interstitial pulmonary edema.  Cardiac notes on ;  'More dyspnic,   wheezing, will give additional diuresis, JUAN MANUEL Tuesday if lung exam improves'  Treatment:

## 2023-12-27 NOTE — PROGRESS NOTES
End of Shift Note    Bedside shift change report given to [address] [place] [address] (oncoming nurse) by Lenora Maciel RN (offgoing nurse). Report included the following information SBAR, Kardex, and MAR    Shift worked:  7am-1930pm     Shift summary and any significant changes:     Prescribed medication done. JUAN MANUEL was done today. Versed was given to the pt in the procedure. Pt seems sleepy. Vitals are stable. Daily care provided. Pt is in contact precaution. Hourly round completed.          Lenora Maciel RN

## 2023-12-27 NOTE — PROGRESS NOTES
End of Shift Note    Bedside shift change report given to Mar Brown (oncoming nurse) by Anjali Lazcano RN (offgoing nurse). Report included the following information SBAR, Kardex, Intake/Output, MAR, Recent Results, and Cardiac Rhythm NSR    Shift worked: Night   Shift summary and any significant changes:    Appeared drowsy at shift change which was associated to Fentanyl and Versed given during JUAN MANUEL. Woke with ease to take her 2100 meds.  Hourly rounding and 2 hourly change of position maintained   Remained drowsy with S/S of LEELA   Concerns for physician to address:       Zone phone for oncoming shift:          Anjali Lazcano, RN

## 2023-12-27 NOTE — PROGRESS NOTES
Occupational Therapy    Acknowledged repeated OT order. Pt evaluated on 12/19/2023 and last seen on 12/22/2023. Please refer to therapy notes.     Polina Laguerre, OT

## 2023-12-27 NOTE — PROGRESS NOTES
Physical Therapy    Asked to see pt as pt was for possible d/c to home with . Previous recs have been for SNF rehab prior to return home as pt has experienced a decline from her baseline. Pt is showing some progress but needs A of 1 for all transfers and amb with a rolling walker and needed assist for management of O2 cord. Feel pt could still benefit from SNF rehab to strengthen prior to return home however /family reported to CM that they wish to take pt home. Pt will need HHPT and will benefit from a rolling walker for increased stability and will likely need wheelchair for mobility out of the home as she does not have tolerance for longer distance amb. Relayed all to CM. Full report to follow. Jeanne Shin PT    Addendum: spoke later with CM and  had then decided to accept recommendation of SNF rehab. Support this decision. See full note for details.

## 2023-12-28 LAB
ANION GAP SERPL CALC-SCNC: 0 MMOL/L (ref 5–15)
BUN SERPL-MCNC: 25 MG/DL (ref 6–20)
BUN/CREAT SERPL: 22 (ref 12–20)
CALCIUM SERPL-MCNC: 9 MG/DL (ref 8.5–10.1)
CHLORIDE SERPL-SCNC: 109 MMOL/L (ref 97–108)
CK SERPL-CCNC: 24 U/L (ref 26–192)
CO2 SERPL-SCNC: 33 MMOL/L (ref 21–32)
CREAT SERPL-MCNC: 1.14 MG/DL (ref 0.55–1.02)
GLUCOSE BLD STRIP.AUTO-MCNC: 106 MG/DL (ref 65–117)
GLUCOSE BLD STRIP.AUTO-MCNC: 127 MG/DL (ref 65–117)
GLUCOSE BLD STRIP.AUTO-MCNC: 184 MG/DL (ref 65–117)
GLUCOSE BLD STRIP.AUTO-MCNC: 86 MG/DL (ref 65–117)
GLUCOSE SERPL-MCNC: 83 MG/DL (ref 65–100)
POTASSIUM SERPL-SCNC: 4.1 MMOL/L (ref 3.5–5.1)
SERVICE CMNT-IMP: ABNORMAL
SERVICE CMNT-IMP: ABNORMAL
SERVICE CMNT-IMP: NORMAL
SERVICE CMNT-IMP: NORMAL
SODIUM SERPL-SCNC: 142 MMOL/L (ref 136–145)

## 2023-12-28 PROCEDURE — C1751 CATH, INF, PER/CENT/MIDLINE: HCPCS

## 2023-12-28 PROCEDURE — 6360000002 HC RX W HCPCS: Performed by: NURSE PRACTITIONER

## 2023-12-28 PROCEDURE — 36569 INSJ PICC 5 YR+ W/O IMAGING: CPT

## 2023-12-28 PROCEDURE — 6360000002 HC RX W HCPCS: Performed by: INTERNAL MEDICINE

## 2023-12-28 PROCEDURE — 2580000003 HC RX 258: Performed by: NURSE PRACTITIONER

## 2023-12-28 PROCEDURE — 82962 GLUCOSE BLOOD TEST: CPT

## 2023-12-28 PROCEDURE — 6370000000 HC RX 637 (ALT 250 FOR IP): Performed by: INTERNAL MEDICINE

## 2023-12-28 PROCEDURE — 80048 BASIC METABOLIC PNL TOTAL CA: CPT

## 2023-12-28 PROCEDURE — 36415 COLL VENOUS BLD VENIPUNCTURE: CPT

## 2023-12-28 PROCEDURE — 2500000003 HC RX 250 WO HCPCS: Performed by: INTERNAL MEDICINE

## 2023-12-28 PROCEDURE — 6360000002 HC RX W HCPCS: Performed by: STUDENT IN AN ORGANIZED HEALTH CARE EDUCATION/TRAINING PROGRAM

## 2023-12-28 PROCEDURE — 02HV33Z INSERTION OF INFUSION DEVICE INTO SUPERIOR VENA CAVA, PERCUTANEOUS APPROACH: ICD-10-PCS | Performed by: NURSE PRACTITIONER

## 2023-12-28 PROCEDURE — 6370000000 HC RX 637 (ALT 250 FOR IP): Performed by: NURSE PRACTITIONER

## 2023-12-28 PROCEDURE — 2580000003 HC RX 258: Performed by: INTERNAL MEDICINE

## 2023-12-28 PROCEDURE — 94640 AIRWAY INHALATION TREATMENT: CPT

## 2023-12-28 PROCEDURE — 82550 ASSAY OF CK (CPK): CPT

## 2023-12-28 PROCEDURE — 2700000000 HC OXYGEN THERAPY PER DAY

## 2023-12-28 PROCEDURE — 76937 US GUIDE VASCULAR ACCESS: CPT

## 2023-12-28 PROCEDURE — 1100000000 HC RM PRIVATE

## 2023-12-28 RX ADMIN — BUMETANIDE 2 MG: 0.25 INJECTION, SOLUTION INTRAMUSCULAR; INTRAVENOUS at 11:03

## 2023-12-28 RX ADMIN — Medication: at 11:18

## 2023-12-28 RX ADMIN — ARFORMOTEROL TARTRATE 15 MCG: 15 SOLUTION RESPIRATORY (INHALATION) at 08:47

## 2023-12-28 RX ADMIN — POTASSIUM CHLORIDE 10 MEQ: 20 TABLET, EXTENDED RELEASE ORAL at 22:06

## 2023-12-28 RX ADMIN — BUDESONIDE 500 MCG: 0.5 INHALANT RESPIRATORY (INHALATION) at 21:11

## 2023-12-28 RX ADMIN — CARVEDILOL 25 MG: 12.5 TABLET, FILM COATED ORAL at 11:02

## 2023-12-28 RX ADMIN — DAPTOMYCIN 700 MG: 500 INJECTION, POWDER, LYOPHILIZED, FOR SOLUTION INTRAVENOUS at 16:15

## 2023-12-28 RX ADMIN — MICONAZOLE NITRATE: 20 CREAM TOPICAL at 11:19

## 2023-12-28 RX ADMIN — POTASSIUM CHLORIDE 10 MEQ: 20 TABLET, EXTENDED RELEASE ORAL at 17:46

## 2023-12-28 RX ADMIN — ENOXAPARIN SODIUM 30 MG: 100 INJECTION SUBCUTANEOUS at 22:07

## 2023-12-28 RX ADMIN — SODIUM CHLORIDE, PRESERVATIVE FREE 10 ML: 5 INJECTION INTRAVENOUS at 22:10

## 2023-12-28 RX ADMIN — BUDESONIDE 500 MCG: 0.5 INHALANT RESPIRATORY (INHALATION) at 08:47

## 2023-12-28 RX ADMIN — VENLAFAXINE HYDROCHLORIDE 150 MG: 150 CAPSULE, EXTENDED RELEASE ORAL at 11:01

## 2023-12-28 RX ADMIN — Medication 400 MG: at 22:07

## 2023-12-28 RX ADMIN — PREGABALIN 300 MG: 150 CAPSULE ORAL at 11:02

## 2023-12-28 RX ADMIN — Medication 1 CAPSULE: at 13:32

## 2023-12-28 RX ADMIN — POTASSIUM CHLORIDE 10 MEQ: 20 TABLET, EXTENDED RELEASE ORAL at 11:00

## 2023-12-28 RX ADMIN — Medication 1000 UNITS: at 11:02

## 2023-12-28 RX ADMIN — MICONAZOLE NITRATE: 20 CREAM TOPICAL at 22:08

## 2023-12-28 RX ADMIN — ISOSORBIDE MONONITRATE 60 MG: 30 TABLET, EXTENDED RELEASE ORAL at 11:02

## 2023-12-28 RX ADMIN — CARVEDILOL 25 MG: 12.5 TABLET, FILM COATED ORAL at 17:45

## 2023-12-28 RX ADMIN — Medication 400 MG: at 11:03

## 2023-12-28 RX ADMIN — CLOTRIMAZOLE AND BETAMETHASONE DIPROPIONATE: 10; .5 CREAM TOPICAL at 22:08

## 2023-12-28 RX ADMIN — POTASSIUM CHLORIDE 10 MEQ: 20 TABLET, EXTENDED RELEASE ORAL at 13:32

## 2023-12-28 RX ADMIN — CLONAZEPAM 0.5 MG: 0.5 TABLET ORAL at 22:06

## 2023-12-28 RX ADMIN — ENOXAPARIN SODIUM 30 MG: 100 INJECTION SUBCUTANEOUS at 11:03

## 2023-12-28 RX ADMIN — PANTOPRAZOLE SODIUM 40 MG: 40 TABLET, DELAYED RELEASE ORAL at 05:38

## 2023-12-28 RX ADMIN — ARFORMOTEROL TARTRATE 15 MCG: 15 SOLUTION RESPIRATORY (INHALATION) at 21:12

## 2023-12-28 RX ADMIN — ASPIRIN 81 MG: 81 TABLET, CHEWABLE ORAL at 11:02

## 2023-12-28 RX ADMIN — SODIUM CHLORIDE, PRESERVATIVE FREE 10 ML: 5 INJECTION INTRAVENOUS at 11:19

## 2023-12-28 RX ADMIN — Medication: at 22:09

## 2023-12-28 RX ADMIN — VENLAFAXINE HYDROCHLORIDE 150 MG: 150 CAPSULE, EXTENDED RELEASE ORAL at 17:46

## 2023-12-28 RX ADMIN — CLOTRIMAZOLE AND BETAMETHASONE DIPROPIONATE: 10; .5 CREAM TOPICAL at 11:17

## 2023-12-28 NOTE — PROGRESS NOTES
Hospitalist Progress Note    NAME:   Maylin Heredia   : 1950   MRN: [id number]     Date/Time: 2023 5:59 PM  Patient PCP: LIZZY Jackson - NP    Estimated discharge date:   Barriers: tolerance of daptomycin      Assessment / Plan:    Nausea, vomiting and abdominal pain likely enteritis  -CT abdomen shows no acute process. -LFTs are within normal limits. Lipase is normal.  Total bilirubin is normal.  Troponin is normal  -She reports improvement of symptoms  he has no diarrhea.  - s/p 4 days of levoflox and flagyl  - vanc transitioned to Dapto    Staph aureus bacteremia 2 x 2  Fever  Right foot ulcer  -Blood cultures from  are showing Staph aureus bacteremia in 2 sets. Continue Vancomycin for Abx coverage  -Follow up culture sensitivities  -Follow up Surveillance cultures  -Check CT of right foot due to right foot ulcer. Consult podiatry. Patient cannot have MRI due to pacemaker  -Follow repeat blood cultures  -Check CBC in a.m.  -ID consult appreciated, continue to follow recommmendations  -TTE results reviewed  -JUAN MANUEL negative for IE   - possible rash after starting dapto on LUE noted from . No rash visible on my exam today  - discussed with ID , would like to avoid outpatient Vanc, trying Dapto again to monitor for rash recurrence    Systolic congestive heart failure-continue Bumex, Coreg  Hypertension, chronic  Hyperlipidemia, chronic  Copd/asthma overlap- requiring 2lpm o2, discharged 23 w/o2  MDD/FELIPA/insomnia, chronic  CKD 3, baseline, baseline cret 2019-present revealed 1.34-2.03.   GERD, chronic  Dermatitis, chronic, eczema, and candida pannus  CAD, h/o, presently asymptomatic  T2DM w/neuropathy chronic, w/o insulin use- diet controlled  Mild anemia- slightly improved since last check, asymptomatic, w/o reported bleeding per pt, recent iron profile wnl  LEELA compliant with cpap   -home meds resumed  -nocturnal cpap ordered, may use home cpap if

## 2023-12-28 NOTE — PROGRESS NOTES
Comprehensive Nutrition Assessment    Type and Reason for Visit:  Initial, RD Nutrition Re-Screen/LOS    Nutrition Recommendations/Plan:   Continue diet as tolerated  If BG consistently >180mg/dL would add Diabetic diet restriction  Please document % meals and supplements consumed in flowsheet I/O's under intake      Nutrition Assessment:  Pt admitted with fever+ PE.  PMH: CKD, HTN, COPD, CAD, DM, GERD.  Chart reviewed for LOS, pt supposed to DC yesterday but this has been postponed due to rash (possible allergic reaction).  MST negative for previous nutritional risk factors.  BG , will monitor need for diabetic restriction.  BM noted today.  Encourage PO intake.        Nutrition Related Findings:    Meds: Vitamin D, protonix, KCl, bumex, cubicin, humalog, culturelle, magox.    BM: 12/28   Wound Type: Diabetic Ulcer       Current Nutrition Intake & Therapies:          ADULT DIET; Regular; Low Fat/Low Chol/High Fiber/2 gm Na    Anthropometric Measures:  Height: 170.2 cm (5' 7\")  Ideal Body Weight (IBW): 135 lbs (61 kg)       Current Body Weight: 123.4 kg (272 lb 0.8 oz), 201.5 % IBW. Weight Source: Bed Scale  Current BMI (kg/m2): 42.6                          BMI Categories: Obese Class 3 (BMI 40.0 or greater)    Estimated Daily Nutrient Needs:  Energy Requirements Based On: Formula  Weight Used for Energy Requirements: Current  Energy (kcal/day): MSJ 1800 (1782 x 1.3 -500 for obesity)  Weight Used for Protein Requirements: Ideal  Protein (g/day): 73-92g (1.2-1.5gPro/kg IBW)  Method Used for Fluid Requirements: 1 ml/kcal  Fluid (ml/day): 1800mL    Nutrition Diagnosis:   No nutrition diagnosis at this time     Nutrition Interventions:   Food and/or Nutrient Delivery: Continue Current Diet  Nutrition Education/Counseling: No recommendation at this time  Coordination of Nutrition Care: Continue to monitor while inpatient       Goals:     Goals: PO intake 75% or greater, by next RD assessment       Nutrition  Monitoring and Evaluation:   Behavioral-Environmental Outcomes: None Identified  Food/Nutrient Intake Outcomes: Food and Nutrient Intake  Physical Signs/Symptoms Outcomes: Biochemical Data, Nutrition Focused Physical Findings, Skin, Weight    Discharge Planning:    Continue current diet     Rosaura De La Rosa RD, [facility]  Contact: ext [phone]

## 2023-12-28 NOTE — PROGRESS NOTES
End of Shift Note    Bedside shift change report given to AGNES Clay (oncoming nurse) by Sandy Felton RN (offgoing nurse). Report included the following information SBAR, Kardex, Intake/Output, MAR, Recent Results, and Cardiac Rhythm Ventricle paced    Shift worked:  7 am to 7:30 pm     Shift summary and any significant changes:     Held all insulin doses due to blood sugars below 200, see MAR. All other scheduled medications administered. Patient denied pain during shift. Worked with PT/OT during shift; patient 1 assist with walker to chair and bedside commode. Patient had bowel movement during shift; incontinence care and purewick change provided. Urine output documented. PIV's flushed and patent. Patient's  came to visit during shift. Nursing rounds and education. Concerns for physician to address:  Patient developed redness and swelling to left arm after IV Daptomycin administration; 1 time dose PO Benadryl administered, see MAR, and MD made aware. Zone phone for oncoming shift:          Activity:     Number times ambulated in hallways past shift: 0  Number of times OOB to chair past shift: 1    Cardiac:   Cardiac Monitoring: Yes           Access:  Current line(s): PIV     Genitourinary:   Urinary status: voiding and external catheter    Respiratory:      Chronic home O2 use?: YES  Incentive spirometer at bedside: YES       GI:     Current diet:  ADULT DIET; Regular; Low Fat/Low Chol/High Fiber/2 gm Na  Passing flatus: YES  Tolerating current diet: YES       Pain Management:   Patient states pain is manageable on current regimen: YES    Skin:     Interventions: float heels, increase time out of bed, PT/OT consult, and internal/external urinary devices    Patient Safety:  Fall Score:    Interventions: bed/chair alarm, assistive device (walker, cane.  etc), gripper socks, and pt to call before getting OOB       Length of Stay:  Expected LOS: 9  Actual LOS: [address], RN

## 2023-12-28 NOTE — CARE COORDINATION
Transition of Care Plan:    RUR: 20%  Prior Level of Functioning: Needs assistance with ADLs  Disposition: SNF-[facility]   Follow up appointments: Follow up with PCP and/or Specialist   DME needed: rolling walker   Transportation at discharge: Family to assist   IM/IMM Medicare/ letter given: 2nd IM Medicare Letter   Is patient a [place] and connected with VA? N/A   If yes, was [employer] transfer form completed and VA notified? N/A  Caregiver Contact: Ro Whittington (spouse) [phone]  Discharge Caregiver contacted prior to discharge? Family to be contacted   Care Conference needed? Not at this time   Barriers to discharge: Medical Stable     CM aware that pt will remain as inpatient for 24hrs to monitor IV abx, due to possible rash (allergic reaction). Pt has gregorio placed. Pt was accepted to [facility]. CM will inform facility that pt is not medically stable to d/c. CM will continue to follow.     CHERYLE Boucher   [phone]

## 2023-12-28 NOTE — PROGRESS NOTES
End of Shift Note    Bedside shift change report given to Mar Brown (oncoming nurse) by Governor Albert RN (offgoing nurse). Report included the following information SBAR, Kardex, MAR, Recent Results, and Cardiac Rhythm Paced     Shift worked: Night     Shift summary and any significant changes:    Pt sleeps very deeply but wakes up with ease and follows instructions. Able to turn and reposition herself. Pericare done and Pure wick changed thrice at night as it was leaking.      Concerns for physician to address:       Zone phone for oncoming shift:            Governor Price RN

## 2023-12-29 VITALS
TEMPERATURE: 97.6 F | BODY MASS INDEX: 42.7 KG/M2 | SYSTOLIC BLOOD PRESSURE: 153 MMHG | DIASTOLIC BLOOD PRESSURE: 91 MMHG | HEART RATE: 80 BPM | OXYGEN SATURATION: 96 % | HEIGHT: 67 IN | RESPIRATION RATE: 14 BRPM | WEIGHT: 272.05 LBS

## 2023-12-29 LAB
GLUCOSE BLD STRIP.AUTO-MCNC: 140 MG/DL (ref 65–117)
GLUCOSE BLD STRIP.AUTO-MCNC: 95 MG/DL (ref 65–117)
SERVICE CMNT-IMP: ABNORMAL
SERVICE CMNT-IMP: NORMAL

## 2023-12-29 PROCEDURE — 6370000000 HC RX 637 (ALT 250 FOR IP): Performed by: NURSE PRACTITIONER

## 2023-12-29 PROCEDURE — 6360000002 HC RX W HCPCS: Performed by: INTERNAL MEDICINE

## 2023-12-29 PROCEDURE — 6370000000 HC RX 637 (ALT 250 FOR IP): Performed by: INTERNAL MEDICINE

## 2023-12-29 PROCEDURE — 94640 AIRWAY INHALATION TREATMENT: CPT

## 2023-12-29 PROCEDURE — 6360000002 HC RX W HCPCS: Performed by: NURSE PRACTITIONER

## 2023-12-29 PROCEDURE — 97535 SELF CARE MNGMENT TRAINING: CPT

## 2023-12-29 PROCEDURE — 2700000000 HC OXYGEN THERAPY PER DAY

## 2023-12-29 PROCEDURE — 2580000003 HC RX 258: Performed by: NURSE PRACTITIONER

## 2023-12-29 PROCEDURE — 82962 GLUCOSE BLOOD TEST: CPT

## 2023-12-29 PROCEDURE — 94760 N-INVAS EAR/PLS OXIMETRY 1: CPT

## 2023-12-29 PROCEDURE — 2580000003 HC RX 258: Performed by: INTERNAL MEDICINE

## 2023-12-29 PROCEDURE — 2500000003 HC RX 250 WO HCPCS: Performed by: INTERNAL MEDICINE

## 2023-12-29 PROCEDURE — 6360000002 HC RX W HCPCS: Performed by: STUDENT IN AN ORGANIZED HEALTH CARE EDUCATION/TRAINING PROGRAM

## 2023-12-29 RX ORDER — HYDRALAZINE HYDROCHLORIDE 20 MG/ML
10 INJECTION INTRAMUSCULAR; INTRAVENOUS EVERY 6 HOURS PRN
Status: DISCONTINUED | OUTPATIENT
Start: 2023-12-29 | End: 2023-12-29 | Stop reason: HOSPADM

## 2023-12-29 RX ADMIN — BUDESONIDE 500 MCG: 0.5 INHALANT RESPIRATORY (INHALATION) at 09:33

## 2023-12-29 RX ADMIN — ENOXAPARIN SODIUM 30 MG: 100 INJECTION SUBCUTANEOUS at 11:52

## 2023-12-29 RX ADMIN — PREGABALIN 300 MG: 150 CAPSULE ORAL at 11:48

## 2023-12-29 RX ADMIN — DAPTOMYCIN 700 MG: 500 INJECTION, POWDER, LYOPHILIZED, FOR SOLUTION INTRAVENOUS at 12:22

## 2023-12-29 RX ADMIN — VENLAFAXINE HYDROCHLORIDE 150 MG: 150 CAPSULE, EXTENDED RELEASE ORAL at 12:18

## 2023-12-29 RX ADMIN — ASPIRIN 81 MG: 81 TABLET, CHEWABLE ORAL at 11:48

## 2023-12-29 RX ADMIN — BUMETANIDE 2 MG: 0.25 INJECTION, SOLUTION INTRAMUSCULAR; INTRAVENOUS at 11:54

## 2023-12-29 RX ADMIN — PANTOPRAZOLE SODIUM 40 MG: 40 TABLET, DELAYED RELEASE ORAL at 12:18

## 2023-12-29 RX ADMIN — SODIUM CHLORIDE: 9 INJECTION, SOLUTION INTRAVENOUS at 12:21

## 2023-12-29 RX ADMIN — Medication: at 12:04

## 2023-12-29 RX ADMIN — CARVEDILOL 25 MG: 12.5 TABLET, FILM COATED ORAL at 12:17

## 2023-12-29 RX ADMIN — SODIUM CHLORIDE, PRESERVATIVE FREE 10 ML: 5 INJECTION INTRAVENOUS at 12:23

## 2023-12-29 RX ADMIN — POTASSIUM CHLORIDE 10 MEQ: 20 TABLET, EXTENDED RELEASE ORAL at 12:17

## 2023-12-29 RX ADMIN — ARFORMOTEROL TARTRATE 15 MCG: 15 SOLUTION RESPIRATORY (INHALATION) at 09:33

## 2023-12-29 RX ADMIN — Medication 1 CAPSULE: at 11:46

## 2023-12-29 RX ADMIN — Medication 1000 UNITS: at 11:48

## 2023-12-29 RX ADMIN — SODIUM CHLORIDE, PRESERVATIVE FREE 10 ML: 5 INJECTION INTRAVENOUS at 11:55

## 2023-12-29 RX ADMIN — Medication 400 MG: at 11:46

## 2023-12-29 RX ADMIN — ISOSORBIDE MONONITRATE 60 MG: 30 TABLET, EXTENDED RELEASE ORAL at 11:47

## 2023-12-29 NOTE — PLAN OF CARE
Problem: Skin/Tissue Integrity  Goal: Absence of new skin breakdown  Description: 1. Monitor for areas of redness and/or skin breakdown  2. Assess vascular access sites hourly  3. Every 4-6 hours minimum:  Change oxygen saturation probe site  4. Every 4-6 hours:  If on nasal continuous positive airway pressure, respiratory therapy assess nares and determine need for appliance change or resting period.   Outcome: Progressing     Problem: Respiratory - Adult  Goal: Achieves optimal ventilation and oxygenation  12/29/2023 0115 by Edson Jesus RN  Outcome: Progressing  12/28/2023 1240 by Prerna Horne RT  Outcome: Progressing  Flowsheets (Taken 12/28/2023 1240)  Achieves optimal ventilation and oxygenation:   Assess for changes in respiratory status   Position to facilitate oxygenation and minimize respiratory effort   Respiratory therapy support as indicated   Oxygen supplementation based on oxygen saturation or arterial blood gases   Assess and instruct to report shortness of breath or any respiratory difficulty     Problem: Discharge Planning  Goal: Discharge to home or other facility with appropriate resources  Outcome: Progressing     Problem: Safety - Adult  Goal: Free from fall injury  Outcome: Progressing     Problem: Chronic Conditions and Co-morbidities  Goal: Patient's chronic conditions and co-morbidity symptoms are monitored and maintained or improved  Outcome: Progressing     Problem: ABCDS Injury Assessment  Goal: Absence of physical injury  Outcome: Progressing

## 2023-12-29 NOTE — DISCHARGE SUMMARY
[id number]  [phone]        Total time in minutes spent coordinating this discharge (includes going over instructions, follow-up, prescriptions, and preparing report for sign off to her PCP) :  35 minutes    Sesar Miller MD

## 2023-12-29 NOTE — PROGRESS NOTES
End of Shift Note    Bedside shift change report given to Uriel Alcaraz RN (oncoming nurse) by Jocelyn Dye RN (offgoing nurse). Report included the following information SBAR, Kardex, MAR, Recent Results, and Cardiac Rhythm Ventricular paced     Shift worked:  7 am to 7:30 pm     Shift summary and any significant changes:     Held all insulin doses due to blood sugars below 200, see MAR. All other scheduled medications administered. Patient denied pain during shift. PICC line was placed for  long-term IV antibiotics/discharge. Patient received another dose of IV Daptomycin and tolerated it well. Patient had bowel movement during shift; incontinence care and purewick change provided. Urine output documented. Wound on right foot clean, dry & intact. PIV and PICC line flushed and patent. Patient's  came to visit during shift. Nursing rounds and education. Concerns for physician to address:       Zone phone for oncoming shift:          Activity:     Number times ambulated in hallways past shift: 0  Number of times OOB to chair past shift: 0    Cardiac:   Cardiac Monitoring: Yes           Access:  Current line(s): PIV and PICC     Genitourinary:   Urinary status: voiding and external catheter    Respiratory:      Chronic home O2 use?: YES  Incentive spirometer at bedside: YES       GI:     Current diet:  ADULT DIET; Regular; Low Fat/Low Chol/High Fiber/2 gm Na  Passing flatus: YES  Tolerating current diet: YES       Pain Management:   Patient states pain is manageable on current regimen: YES    Skin:     Interventions: float heels, increase time out of bed, PT/OT consult, and internal/external urinary devices    Patient Safety:  Fall Score:    Interventions: bed/chair alarm, assistive device (walker, cane.  etc), gripper socks, and pt to call before getting OOB       Length of Stay:  Expected LOS: 10  Actual LOS: 2      Jocelyn Dye RN

## 2023-12-29 NOTE — CARE COORDINATION
12/29/23 [facility] Discharge   Transition of Care Consult (CM Consult) SNF  ([address] [place] [address])   Partner SNF Yes   Services At/After Discharge [address] (SNF)  ([address] [place] [address])   [address] Provided? Yes   Mode of Transport at Discharge BLS   Confirm Follow Up Transport Family   Condition of Participation: Discharge Planning   The Patient and/or Patient Representative was provided with a Choice of Provider? Patient Representative  (pts spouse)   The Patient and/Or Patient Representative agree with the Discharge Plan? Yes   Freedom of Choice list was provided with basic dialogue that supports the patient's individualized plan of care/goals, treatment preferences, and shares the quality data associated with the providers? Yes     Transition of Care Plan to SNF/Rehab    Communication to Patient/Family:  Met with patient and family and they are agreeable to the transition plan. The Plan for Transition of Care is related to the following treatment goals:     Pt will d/c on today and will transition to snf: [address] [place] [address], for rehab. CM informed pts spouse of the following and he was agreeable to the following (verbal consent). Pt will be transport by [facility] today at 2P    The Patient and/or patient representative was provided with a choice of provider and agrees  with the discharge plan. Yes [x] No []    A Freedom of choice list was provided with basic dialogue that supports the patient's individualized plan of care/goals and shares the quality data associated with the providers. Yes [x] No []    SNF/Rehab Transition:  Patient has been accepted to [address] [place] [address] SNF/Rehab and meets criteria for admission. Patient will transported by [facility] and expected to leave at 2P. Communication to SNF/Rehab:  Bedside RN, ONC Unit , has been notified to update the transition plan to the facility and call report ().   Discharge information has been updated on the AVS. And communicated to facility via [employer], or CC link.     Discharge instructions to be fax'd to facility at (Fax #).     [] BCPI-A  Patient has been identified as part of the BCPI-A Program.  For Care Coordination associated with that Bundle Program, please contact   Bundle information has been communication to     Nursing Please include all hard scripts for controlled substances, med rec and dc summary, and AVS in packet.     Reviewed and confirmed with facility, [facility] , can manage the patient care needs for the following:     Dev with (X) only those applicable:  Medication:  [x]Medications are available at the facility  []IV Antibiotics    []Controlled Substance - hard copies available sent.  []Weekly Labs    Equipment:  []CPAP/BiPAP  []Wound Vacuum  []Lane or Urinary Device  []PICC/Central Line  []Nebulizer  []Ventilator    Treatment:  []Isolation (for MRSA, VRE, etc.)  []Surgical Drain Management  []Tracheostomy Care  []Dressing Changes  []Dialysis with transportation  []PEG Care  []Oxygen  []Daily Weights for Heart Failure    Dietary:  []Any diet limitations  []Tube Feedings   []Total Parenteral Management (TPN)    Financial Resources:  []Medicaid Application Completed    []UAI Completed and copy given to pt/family  and copy given to pt/family  []A screening has previously been completed.    []Level II Completed    [] Private pay individual who will not become   financially eligible for Medicaid within 6 months from admission to a [facility].     [] Individual refused to have screening conducted.     []Medicaid Application Completed    []The screening denied because it was determined individual did not need/did not qualify for nursing facility level of care.  [] Out of state residents seeking direct admission to a VA nursing facility.  [] Individuals who are inpatients of an out of state hospital, or in state or out of state veterans/ hospital

## 2023-12-29 NOTE — DISCHARGE INSTRUCTIONS
You were treated for a foot ulcer and bacteria in your blood. You are being discharged on intravenous antibiotics through January 17th. Please followup with the providers listed in this paperwork and take the medications as listed.

## 2024-01-03 NOTE — TELEPHONE ENCOUNTER
Left message to return my call. 2 weeks/Nothing per vagina/No tub baths/No douching/No tampons/No intercourse

## 2024-01-22 ENCOUNTER — APPOINTMENT (OUTPATIENT)
Facility: HOSPITAL | Age: 74
End: 2024-01-22
Payer: MEDICARE

## 2024-01-22 ENCOUNTER — HOSPITAL ENCOUNTER (INPATIENT)
Facility: HOSPITAL | Age: 74
LOS: 5 days | Discharge: HOME | End: 2024-01-27
Attending: STUDENT IN AN ORGANIZED HEALTH CARE EDUCATION/TRAINING PROGRAM | Admitting: INTERNAL MEDICINE
Payer: MEDICARE

## 2024-01-22 DIAGNOSIS — U07.1 COVID-19: ICD-10-CM

## 2024-01-22 DIAGNOSIS — J81.0 ACUTE PULMONARY EDEMA (HCC): ICD-10-CM

## 2024-01-22 DIAGNOSIS — N30.00 ACUTE CYSTITIS WITHOUT HEMATURIA: Primary | ICD-10-CM

## 2024-01-22 DIAGNOSIS — I50.9 ACUTE ON CHRONIC CONGESTIVE HEART FAILURE, UNSPECIFIED HEART FAILURE TYPE (HCC): ICD-10-CM

## 2024-01-22 DIAGNOSIS — R53.83 OTHER FATIGUE: ICD-10-CM

## 2024-01-22 LAB
ALBUMIN SERPL-MCNC: 2.7 G/DL (ref 3.5–5)
ALBUMIN/GLOB SERPL: 0.8 (ref 1.1–2.2)
ALP SERPL-CCNC: 169 U/L (ref 45–117)
ALT SERPL-CCNC: 12 U/L (ref 12–78)
ANION GAP SERPL CALC-SCNC: 3 MMOL/L (ref 5–15)
APPEARANCE UR: ABNORMAL
AST SERPL-CCNC: 10 U/L (ref 15–37)
BACTERIA URNS QL MICRO: ABNORMAL /HPF
BASOPHILS # BLD: 0 K/UL (ref 0–0.1)
BASOPHILS NFR BLD: 0 % (ref 0–1)
BILIRUB SERPL-MCNC: 0.5 MG/DL (ref 0.2–1)
BILIRUB UR QL: NEGATIVE
BUN SERPL-MCNC: 17 MG/DL (ref 6–20)
BUN/CREAT SERPL: 13 (ref 12–20)
CALCIUM SERPL-MCNC: 8.8 MG/DL (ref 8.5–10.1)
CAOX CRY URNS QL MICRO: ABNORMAL
CHLORIDE SERPL-SCNC: 114 MMOL/L (ref 97–108)
CO2 SERPL-SCNC: 29 MMOL/L (ref 21–32)
COLOR UR: ABNORMAL
CREAT SERPL-MCNC: 1.29 MG/DL (ref 0.55–1.02)
DIFFERENTIAL METHOD BLD: ABNORMAL
EOSINOPHIL # BLD: 0.4 K/UL (ref 0–0.4)
EOSINOPHIL NFR BLD: 9 % (ref 0–7)
EPITH CASTS URNS QL MICRO: ABNORMAL /LPF
ERYTHROCYTE [DISTWIDTH] IN BLOOD BY AUTOMATED COUNT: 16.9 % (ref 11.5–14.5)
GLOBULIN SER CALC-MCNC: 3.5 G/DL (ref 2–4)
GLUCOSE BLD STRIP.AUTO-MCNC: 112 MG/DL (ref 65–117)
GLUCOSE SERPL-MCNC: 102 MG/DL (ref 65–100)
GLUCOSE UR STRIP.AUTO-MCNC: NEGATIVE MG/DL
HCT VFR BLD AUTO: 34.8 % (ref 35–47)
HGB BLD-MCNC: 9.6 G/DL (ref 11.5–16)
HGB UR QL STRIP: ABNORMAL
IMM GRANULOCYTES # BLD AUTO: 0 K/UL (ref 0–0.04)
IMM GRANULOCYTES NFR BLD AUTO: 1 % (ref 0–0.5)
KETONES UR QL STRIP.AUTO: NEGATIVE MG/DL
LEUKOCYTE ESTERASE UR QL STRIP.AUTO: ABNORMAL
LYMPHOCYTES # BLD: 0.5 K/UL (ref 0.8–3.5)
LYMPHOCYTES NFR BLD: 12 % (ref 12–49)
MAGNESIUM SERPL-MCNC: 1.9 MG/DL (ref 1.6–2.4)
MCH RBC QN AUTO: 23.2 PG (ref 26–34)
MCHC RBC AUTO-ENTMCNC: 27.6 G/DL (ref 30–36.5)
MCV RBC AUTO: 84.3 FL (ref 80–99)
MONOCYTES # BLD: 0.3 K/UL (ref 0–1)
MONOCYTES NFR BLD: 6 % (ref 5–13)
NEUTS SEG # BLD: 3.2 K/UL (ref 1.8–8)
NEUTS SEG NFR BLD: 72 % (ref 32–75)
NITRITE UR QL STRIP.AUTO: POSITIVE
NRBC # BLD: 0 K/UL (ref 0–0.01)
NRBC BLD-RTO: 0 PER 100 WBC
NT PRO BNP: 6492 PG/ML
PH UR STRIP: 6 (ref 5–8)
PLATELET # BLD AUTO: 186 K/UL (ref 150–400)
PMV BLD AUTO: 11.5 FL (ref 8.9–12.9)
POTASSIUM SERPL-SCNC: 4.3 MMOL/L (ref 3.5–5.1)
PROT SERPL-MCNC: 6.2 G/DL (ref 6.4–8.2)
PROT UR STRIP-MCNC: 30 MG/DL
RBC # BLD AUTO: 4.13 M/UL (ref 3.8–5.2)
RBC #/AREA URNS HPF: ABNORMAL /HPF (ref 0–5)
RBC MORPH BLD: ABNORMAL
RBC MORPH BLD: ABNORMAL
SARS-COV-2 RDRP RESP QL NAA+PROBE: DETECTED
SERVICE CMNT-IMP: NORMAL
SODIUM SERPL-SCNC: 146 MMOL/L (ref 136–145)
SOURCE: ABNORMAL
SP GR UR REFRACTOMETRY: 1.02
TROPONIN I SERPL HS-MCNC: 21 NG/L (ref 0–51)
TSH SERPL DL<=0.05 MIU/L-ACNC: 3.37 UIU/ML (ref 0.36–3.74)
URINE CULTURE IF INDICATED: ABNORMAL
UROBILINOGEN UR QL STRIP.AUTO: 0.2 EU/DL (ref 0.2–1)
WBC # BLD AUTO: 4.4 K/UL (ref 3.6–11)
WBC URNS QL MICRO: ABNORMAL /HPF (ref 0–4)

## 2024-01-22 PROCEDURE — 84443 ASSAY THYROID STIM HORMONE: CPT

## 2024-01-22 PROCEDURE — 85379 FIBRIN DEGRADATION QUANT: CPT

## 2024-01-22 PROCEDURE — 96374 THER/PROPH/DIAG INJ IV PUSH: CPT

## 2024-01-22 PROCEDURE — 81001 URINALYSIS AUTO W/SCOPE: CPT

## 2024-01-22 PROCEDURE — 86140 C-REACTIVE PROTEIN: CPT

## 2024-01-22 PROCEDURE — 71045 X-RAY EXAM CHEST 1 VIEW: CPT

## 2024-01-22 PROCEDURE — 2580000003 HC RX 258: Performed by: INTERNAL MEDICINE

## 2024-01-22 PROCEDURE — 96375 TX/PRO/DX INJ NEW DRUG ADDON: CPT

## 2024-01-22 PROCEDURE — 99285 EMERGENCY DEPT VISIT HI MDM: CPT

## 2024-01-22 PROCEDURE — 87186 SC STD MICRODIL/AGAR DIL: CPT

## 2024-01-22 PROCEDURE — 82962 GLUCOSE BLOOD TEST: CPT

## 2024-01-22 PROCEDURE — 83880 ASSAY OF NATRIURETIC PEPTIDE: CPT

## 2024-01-22 PROCEDURE — 85025 COMPLETE CBC W/AUTO DIFF WBC: CPT

## 2024-01-22 PROCEDURE — 36415 COLL VENOUS BLD VENIPUNCTURE: CPT

## 2024-01-22 PROCEDURE — 2060000000 HC ICU INTERMEDIATE R&B

## 2024-01-22 PROCEDURE — 87077 CULTURE AEROBIC IDENTIFY: CPT

## 2024-01-22 PROCEDURE — 84484 ASSAY OF TROPONIN QUANT: CPT

## 2024-01-22 PROCEDURE — 2580000003 HC RX 258: Performed by: STUDENT IN AN ORGANIZED HEALTH CARE EDUCATION/TRAINING PROGRAM

## 2024-01-22 PROCEDURE — 80053 COMPREHEN METABOLIC PANEL: CPT

## 2024-01-22 PROCEDURE — 82803 BLOOD GASES ANY COMBINATION: CPT

## 2024-01-22 PROCEDURE — 93005 ELECTROCARDIOGRAM TRACING: CPT | Performed by: STUDENT IN AN ORGANIZED HEALTH CARE EDUCATION/TRAINING PROGRAM

## 2024-01-22 PROCEDURE — 87086 URINE CULTURE/COLONY COUNT: CPT

## 2024-01-22 PROCEDURE — 6360000002 HC RX W HCPCS: Performed by: STUDENT IN AN ORGANIZED HEALTH CARE EDUCATION/TRAINING PROGRAM

## 2024-01-22 PROCEDURE — 87635 SARS-COV-2 COVID-19 AMP PRB: CPT

## 2024-01-22 PROCEDURE — 83735 ASSAY OF MAGNESIUM: CPT

## 2024-01-22 RX ORDER — INSULIN LISPRO 100 [IU]/ML
0-4 INJECTION, SOLUTION INTRAVENOUS; SUBCUTANEOUS
Status: DISCONTINUED | OUTPATIENT
Start: 2024-01-23 | End: 2024-01-27 | Stop reason: HOSPADM

## 2024-01-22 RX ORDER — BUDESONIDE 0.5 MG/2ML
0.5 INHALANT ORAL
Status: DISCONTINUED | OUTPATIENT
Start: 2024-01-22 | End: 2024-01-27 | Stop reason: HOSPADM

## 2024-01-22 RX ORDER — SODIUM CHLORIDE 9 MG/ML
INJECTION, SOLUTION INTRAVENOUS PRN
Status: DISCONTINUED | OUTPATIENT
Start: 2024-01-22 | End: 2024-01-27 | Stop reason: HOSPADM

## 2024-01-22 RX ORDER — ONDANSETRON 4 MG/1
4 TABLET, ORALLY DISINTEGRATING ORAL EVERY 8 HOURS PRN
Status: DISCONTINUED | OUTPATIENT
Start: 2024-01-22 | End: 2024-01-27 | Stop reason: HOSPADM

## 2024-01-22 RX ORDER — SODIUM CHLORIDE 0.9 % (FLUSH) 0.9 %
5-40 SYRINGE (ML) INJECTION PRN
Status: DISCONTINUED | OUTPATIENT
Start: 2024-01-22 | End: 2024-01-27 | Stop reason: HOSPADM

## 2024-01-22 RX ORDER — ACETAMINOPHEN 500 MG
500 TABLET ORAL EVERY 6 HOURS PRN
Status: DISCONTINUED | OUTPATIENT
Start: 2024-01-22 | End: 2024-01-27 | Stop reason: HOSPADM

## 2024-01-22 RX ORDER — ASPIRIN 81 MG/1
81 TABLET, CHEWABLE ORAL DAILY
Status: DISCONTINUED | OUTPATIENT
Start: 2024-01-23 | End: 2024-01-23

## 2024-01-22 RX ORDER — VENLAFAXINE HYDROCHLORIDE 37.5 MG/1
150 CAPSULE, EXTENDED RELEASE ORAL 2 TIMES DAILY WITH MEALS
Status: DISCONTINUED | OUTPATIENT
Start: 2024-01-23 | End: 2024-01-27 | Stop reason: HOSPADM

## 2024-01-22 RX ORDER — CARVEDILOL 12.5 MG/1
25 TABLET ORAL 2 TIMES DAILY WITH MEALS
Status: DISCONTINUED | OUTPATIENT
Start: 2024-01-22 | End: 2024-01-27 | Stop reason: HOSPADM

## 2024-01-22 RX ORDER — POLYETHYLENE GLYCOL 3350 17 G/17G
17 POWDER, FOR SOLUTION ORAL DAILY PRN
Status: DISCONTINUED | OUTPATIENT
Start: 2024-01-22 | End: 2024-01-27 | Stop reason: HOSPADM

## 2024-01-22 RX ORDER — PREGABALIN 100 MG/1
300 CAPSULE ORAL DAILY
Status: DISCONTINUED | OUTPATIENT
Start: 2024-01-23 | End: 2024-01-27 | Stop reason: HOSPADM

## 2024-01-22 RX ORDER — ONDANSETRON 2 MG/ML
4 INJECTION INTRAMUSCULAR; INTRAVENOUS EVERY 6 HOURS PRN
Status: DISCONTINUED | OUTPATIENT
Start: 2024-01-22 | End: 2024-01-27 | Stop reason: HOSPADM

## 2024-01-22 RX ORDER — SODIUM CHLORIDE 0.9 % (FLUSH) 0.9 %
5-40 SYRINGE (ML) INJECTION EVERY 12 HOURS SCHEDULED
Status: DISCONTINUED | OUTPATIENT
Start: 2024-01-22 | End: 2024-01-27 | Stop reason: HOSPADM

## 2024-01-22 RX ORDER — LABETALOL HYDROCHLORIDE 5 MG/ML
20 INJECTION INTRAVENOUS EVERY 4 HOURS PRN
Status: DISCONTINUED | OUTPATIENT
Start: 2024-01-22 | End: 2024-01-23

## 2024-01-22 RX ORDER — ISOSORBIDE MONONITRATE 30 MG/1
60 TABLET, EXTENDED RELEASE ORAL DAILY
Status: DISCONTINUED | OUTPATIENT
Start: 2024-01-23 | End: 2024-01-27 | Stop reason: HOSPADM

## 2024-01-22 RX ORDER — LANOLIN ALCOHOL/MO/W.PET/CERES
400 CREAM (GRAM) TOPICAL 2 TIMES DAILY
Status: DISCONTINUED | OUTPATIENT
Start: 2024-01-22 | End: 2024-01-27 | Stop reason: HOSPADM

## 2024-01-22 RX ORDER — POTASSIUM CHLORIDE 20 MEQ/1
20 TABLET, EXTENDED RELEASE ORAL 2 TIMES DAILY
Status: DISCONTINUED | OUTPATIENT
Start: 2024-01-22 | End: 2024-01-27 | Stop reason: HOSPADM

## 2024-01-22 RX ORDER — BUMETANIDE 0.25 MG/ML
2 INJECTION INTRAMUSCULAR; INTRAVENOUS 2 TIMES DAILY
Status: DISCONTINUED | OUTPATIENT
Start: 2024-01-23 | End: 2024-01-27

## 2024-01-22 RX ORDER — DEXTROSE MONOHYDRATE 100 MG/ML
INJECTION, SOLUTION INTRAVENOUS CONTINUOUS PRN
Status: DISCONTINUED | OUTPATIENT
Start: 2024-01-22 | End: 2024-01-27 | Stop reason: HOSPADM

## 2024-01-22 RX ORDER — ENOXAPARIN SODIUM 100 MG/ML
30 INJECTION SUBCUTANEOUS 2 TIMES DAILY
Status: DISCONTINUED | OUTPATIENT
Start: 2024-01-22 | End: 2024-01-27 | Stop reason: HOSPADM

## 2024-01-22 RX ORDER — INSULIN LISPRO 100 [IU]/ML
0-4 INJECTION, SOLUTION INTRAVENOUS; SUBCUTANEOUS NIGHTLY
Status: DISCONTINUED | OUTPATIENT
Start: 2024-01-22 | End: 2024-01-27 | Stop reason: HOSPADM

## 2024-01-22 RX ORDER — LEVOFLOXACIN 5 MG/ML
750 INJECTION, SOLUTION INTRAVENOUS EVERY 24 HOURS
Status: DISCONTINUED | OUTPATIENT
Start: 2024-01-23 | End: 2024-01-25

## 2024-01-22 RX ORDER — IPRATROPIUM BROMIDE AND ALBUTEROL SULFATE 2.5; .5 MG/3ML; MG/3ML
1 SOLUTION RESPIRATORY (INHALATION)
Status: DISCONTINUED | OUTPATIENT
Start: 2024-01-23 | End: 2024-01-23

## 2024-01-22 RX ORDER — FUROSEMIDE 10 MG/ML
40 INJECTION INTRAMUSCULAR; INTRAVENOUS ONCE
Status: COMPLETED | OUTPATIENT
Start: 2024-01-22 | End: 2024-01-22

## 2024-01-22 RX ADMIN — SODIUM CHLORIDE, PRESERVATIVE FREE 10 ML: 5 INJECTION INTRAVENOUS at 23:37

## 2024-01-22 RX ADMIN — FUROSEMIDE 40 MG: 10 INJECTION, SOLUTION INTRAMUSCULAR; INTRAVENOUS at 19:55

## 2024-01-22 RX ADMIN — SODIUM CHLORIDE 1000 MG: 900 INJECTION INTRAVENOUS at 19:56

## 2024-01-22 ASSESSMENT — PAIN SCALES - GENERAL: PAINLEVEL_OUTOF10: 0

## 2024-01-22 NOTE — ED PROVIDER NOTES
[facility] EMERGENCY DEPT  EMERGENCY DEPARTMENT ENCOUNTER       Pt Name: Marilee Oakes  MRN: [id number]  Birthdate 1950  Date of evaluation: 1/22/2024  Provider: Rell Jones MD   PCP: Parish Dumont APRN - NP  Note Started: 5:03 PM EST 1/22/24     CHIEF COMPLAINT       Chief Complaint   Patient presents with    Fatigue     Pt BIBEMS for cc of generalized malaise for x3 days. Pt wears 4LNC at baseline for CHF. Pt endorses a headache.         HISTORY OF PRESENT ILLNESS: 1 or more elements      History From: patient, History limited by: none     Marilee Oakes is a 73 y.o. female presenting with fatigue.  History is bit limited as patient appears quite malaised.  Notes some chest pain all over.  For me she denies a headache but per triage she did have a headache at that time.  Notes some chronic shortness of breath.  She notes been fatigued for several months so unsure of the acuity of this.  There is also reports of a AICD issue but patient unsure of this.       Please See MDM for Additional Details of the HPI/PMH  Nursing Notes were all reviewed and agreed with or any disagreements were addressed in the HPI.     REVIEW OF SYSTEMS        Positives and Pertinent negatives as per HPI.    PAST HISTORY     Past Medical History:  Past Medical History:   Diagnosis Date    Age-related osteoporosis without current pathological fracture     Anxiety and depression 01/22/2018    Arthritis     OA    Asthma     CAD (coronary artery disease)     Chronic systolic heart failure (HCC)     CKD stage G3b/A1, GFR 30-44 and albumin creatinine ratio <30 mg/g (HCC)     Essential hypertension     GERD (gastroesophageal reflux disease)     History of diverticulitis     diverticulitis    History of echocardiogram 11/2021    LV: Estimated LVEF is 40 - 45%. Visually measured ejection fraction. Normal cavity size and wall thickness. Globally reduced systolic function.  LA: Moderately dilated left atrium. Left atrial appendage is  capsule 300 mg ( Oral Automatically Held 1/26/24 0900)   venlafaxine (EFFEXOR XR) extended release capsule 150 mg ( Oral Automatically Held 1/26/24 1700)   sodium chloride flush 0.9 % injection 5-40 mL (has no administration in time range)   sodium chloride flush 0.9 % injection 5-40 mL (has no administration in time range)   0.9 % sodium chloride infusion (has no administration in time range)   enoxaparin Sodium (LOVENOX) injection 30 mg (has no administration in time range)   ondansetron (ZOFRAN-ODT) disintegrating tablet 4 mg (has no administration in time range)     Or   ondansetron (ZOFRAN) injection 4 mg (has no administration in time range)   polyethylene glycol (GLYCOLAX) packet 17 g (has no administration in time range)   insulin lispro (HUMALOG) injection vial 0-4 Units (has no administration in time range)   insulin lispro (HUMALOG) injection vial 0-4 Units (has no administration in time range)   glucose chewable tablet 16 g (has no administration in time range)   dextrose bolus 10% 125 mL (has no administration in time range)     Or   dextrose bolus 10% 250 mL (has no administration in time range)   glucagon injection 1 mg (has no administration in time range)   dextrose 10 % infusion (has no administration in time range)   furosemide (LASIX) injection 40 mg (40 mg IntraVENous Given 1/22/24 1955)   cefTRIAXone (ROCEPHIN) 1,000 mg in sodium chloride 0.9 % 50 mL IVPB (mini-bag) (1,000 mg IntraVENous New Bag 1/22/24 1956)       Medical Decision Making  73-year-old female with history of ischemic cardiomyopathy on 4 L nasal cannula at baseline, asthma, IBS, CKD presenting with fatigue.  History is bit limited as patient appears quite malaised.  Notes some chest pain all over.  For me she denies a headache but per triage she did have a headache at that time.  Notes some chronic shortness of breath.  She notes been fatigued for several months so unsure of the acuity of this.  There is also reports of a AICD

## 2024-01-23 LAB
ANION GAP SERPL CALC-SCNC: 2 MMOL/L (ref 5–15)
ARTERIAL PATENCY WRIST A: NEGATIVE
BASE EXCESS BLDV CALC-SCNC: 3.1 MMOL/L
BASOPHILS # BLD: 0 K/UL (ref 0–0.1)
BASOPHILS NFR BLD: 0 % (ref 0–1)
BDY SITE: ABNORMAL
BUN SERPL-MCNC: 15 MG/DL (ref 6–20)
BUN/CREAT SERPL: 12 (ref 12–20)
CALCIUM SERPL-MCNC: 8.9 MG/DL (ref 8.5–10.1)
CHLORIDE SERPL-SCNC: 109 MMOL/L (ref 97–108)
CO2 SERPL-SCNC: 31 MMOL/L (ref 21–32)
CREAT SERPL-MCNC: 1.29 MG/DL (ref 0.55–1.02)
CRP SERPL-MCNC: 4.56 MG/DL (ref 0–0.6)
D DIMER PPP FEU-MCNC: 3.88 MG/L FEU (ref 0–0.65)
DIFFERENTIAL METHOD BLD: ABNORMAL
EKG ATRIAL RATE: 73 BPM
EKG DIAGNOSIS: NORMAL
EKG Q-T INTERVAL: 426 MS
EKG QRS DURATION: 136 MS
EKG QTC CALCULATION (BAZETT): 491 MS
EKG R AXIS: -70 DEGREES
EKG T AXIS: 103 DEGREES
EKG VENTRICULAR RATE: 80 BPM
EOSINOPHIL # BLD: 0.4 K/UL (ref 0–0.4)
EOSINOPHIL NFR BLD: 9 % (ref 0–7)
ERYTHROCYTE [DISTWIDTH] IN BLOOD BY AUTOMATED COUNT: 16.9 % (ref 11.5–14.5)
GAS FLOW.O2 O2 DELIVERY SYS: ABNORMAL
GLUCOSE BLD STRIP.AUTO-MCNC: 130 MG/DL (ref 65–117)
GLUCOSE BLD STRIP.AUTO-MCNC: 152 MG/DL (ref 65–117)
GLUCOSE BLD STRIP.AUTO-MCNC: 97 MG/DL (ref 65–117)
GLUCOSE BLD STRIP.AUTO-MCNC: 99 MG/DL (ref 65–117)
GLUCOSE SERPL-MCNC: 112 MG/DL (ref 65–100)
HCO3 BLDV-SCNC: 29.1 MMOL/L (ref 23–28)
HCT VFR BLD AUTO: 36 % (ref 35–47)
HGB BLD-MCNC: 9.9 G/DL (ref 11.5–16)
IMM GRANULOCYTES # BLD AUTO: 0 K/UL (ref 0–0.04)
IMM GRANULOCYTES NFR BLD AUTO: 1 % (ref 0–0.5)
LYMPHOCYTES # BLD: 0.5 K/UL (ref 0.8–3.5)
LYMPHOCYTES NFR BLD: 12 % (ref 12–49)
MAGNESIUM SERPL-MCNC: 1.8 MG/DL (ref 1.6–2.4)
MCH RBC QN AUTO: 23 PG (ref 26–34)
MCHC RBC AUTO-ENTMCNC: 27.5 G/DL (ref 30–36.5)
MCV RBC AUTO: 83.5 FL (ref 80–99)
MONOCYTES # BLD: 0.2 K/UL (ref 0–1)
MONOCYTES NFR BLD: 6 % (ref 5–13)
NEUTS SEG # BLD: 2.9 K/UL (ref 1.8–8)
NEUTS SEG NFR BLD: 72 % (ref 32–75)
NRBC # BLD: 0 K/UL (ref 0–0.01)
NRBC BLD-RTO: 0 PER 100 WBC
O2/TOTAL GAS SETTING VFR VENT: 28 %
PCO2 BLDV: 50.1 MMHG (ref 41–51)
PH BLDV: 7.37 (ref 7.32–7.42)
PLATELET # BLD AUTO: 197 K/UL (ref 150–400)
PMV BLD AUTO: 11.4 FL (ref 8.9–12.9)
PO2 BLDV: 154 MMHG (ref 25–40)
POTASSIUM SERPL-SCNC: 3.4 MMOL/L (ref 3.5–5.1)
RBC # BLD AUTO: 4.31 M/UL (ref 3.8–5.2)
RBC MORPH BLD: ABNORMAL
SAO2 % BLDV: 99.3 % (ref 65–88)
SERVICE CMNT-IMP: ABNORMAL
SERVICE CMNT-IMP: NORMAL
SERVICE CMNT-IMP: NORMAL
SODIUM SERPL-SCNC: 142 MMOL/L (ref 136–145)
SPECIMEN TYPE: ABNORMAL
WBC # BLD AUTO: 4 K/UL (ref 3.6–11)

## 2024-01-23 PROCEDURE — 6360000002 HC RX W HCPCS: Performed by: STUDENT IN AN ORGANIZED HEALTH CARE EDUCATION/TRAINING PROGRAM

## 2024-01-23 PROCEDURE — 2060000000 HC ICU INTERMEDIATE R&B

## 2024-01-23 PROCEDURE — 80048 BASIC METABOLIC PNL TOTAL CA: CPT

## 2024-01-23 PROCEDURE — 6360000002 HC RX W HCPCS: Performed by: INTERNAL MEDICINE

## 2024-01-23 PROCEDURE — 6370000000 HC RX 637 (ALT 250 FOR IP): Performed by: INTERNAL MEDICINE

## 2024-01-23 PROCEDURE — 2700000000 HC OXYGEN THERAPY PER DAY

## 2024-01-23 PROCEDURE — 83735 ASSAY OF MAGNESIUM: CPT

## 2024-01-23 PROCEDURE — 92610 EVALUATE SWALLOWING FUNCTION: CPT

## 2024-01-23 PROCEDURE — 6370000000 HC RX 637 (ALT 250 FOR IP): Performed by: STUDENT IN AN ORGANIZED HEALTH CARE EDUCATION/TRAINING PROGRAM

## 2024-01-23 PROCEDURE — C9113 INJ PANTOPRAZOLE SODIUM, VIA: HCPCS | Performed by: INTERNAL MEDICINE

## 2024-01-23 PROCEDURE — 2580000003 HC RX 258: Performed by: INTERNAL MEDICINE

## 2024-01-23 PROCEDURE — 36415 COLL VENOUS BLD VENIPUNCTURE: CPT

## 2024-01-23 PROCEDURE — 82962 GLUCOSE BLOOD TEST: CPT

## 2024-01-23 PROCEDURE — A4216 STERILE WATER/SALINE, 10 ML: HCPCS | Performed by: INTERNAL MEDICINE

## 2024-01-23 PROCEDURE — 94640 AIRWAY INHALATION TREATMENT: CPT

## 2024-01-23 PROCEDURE — 85025 COMPLETE CBC W/AUTO DIFF WBC: CPT

## 2024-01-23 RX ORDER — HYDRALAZINE HYDROCHLORIDE 20 MG/ML
10 INJECTION INTRAMUSCULAR; INTRAVENOUS ONCE
Status: COMPLETED | OUTPATIENT
Start: 2024-01-23 | End: 2024-01-23

## 2024-01-23 RX ORDER — AMLODIPINE BESYLATE 5 MG/1
5 TABLET ORAL DAILY
Status: DISCONTINUED | OUTPATIENT
Start: 2024-01-23 | End: 2024-01-23

## 2024-01-23 RX ORDER — ASPIRIN 81 MG/1
81 TABLET, CHEWABLE ORAL 2 TIMES DAILY
Status: DISCONTINUED | OUTPATIENT
Start: 2024-01-23 | End: 2024-01-27 | Stop reason: HOSPADM

## 2024-01-23 RX ORDER — AMLODIPINE BESYLATE 5 MG/1
10 TABLET ORAL DAILY
Status: DISCONTINUED | OUTPATIENT
Start: 2024-01-24 | End: 2024-01-27 | Stop reason: HOSPADM

## 2024-01-23 RX ORDER — IPRATROPIUM BROMIDE AND ALBUTEROL SULFATE 2.5; .5 MG/3ML; MG/3ML
1 SOLUTION RESPIRATORY (INHALATION)
Status: DISCONTINUED | OUTPATIENT
Start: 2024-01-23 | End: 2024-01-27 | Stop reason: HOSPADM

## 2024-01-23 RX ORDER — AMLODIPINE BESYLATE 5 MG/1
5 TABLET ORAL ONCE
Status: COMPLETED | OUTPATIENT
Start: 2024-01-23 | End: 2024-01-23

## 2024-01-23 RX ORDER — HYDRALAZINE HYDROCHLORIDE 20 MG/ML
10 INJECTION INTRAMUSCULAR; INTRAVENOUS EVERY 6 HOURS PRN
Status: DISCONTINUED | OUTPATIENT
Start: 2024-01-23 | End: 2024-01-27 | Stop reason: HOSPADM

## 2024-01-23 RX ADMIN — LABETALOL HYDROCHLORIDE 20 MG: 5 INJECTION INTRAVENOUS at 08:01

## 2024-01-23 RX ADMIN — IPRATROPIUM BROMIDE AND ALBUTEROL SULFATE 1 DOSE: 2.5; .5 SOLUTION RESPIRATORY (INHALATION) at 20:00

## 2024-01-23 RX ADMIN — SODIUM CHLORIDE, PRESERVATIVE FREE 10 ML: 5 INJECTION INTRAVENOUS at 08:01

## 2024-01-23 RX ADMIN — POTASSIUM CHLORIDE 20 MEQ: 1500 TABLET, EXTENDED RELEASE ORAL at 21:50

## 2024-01-23 RX ADMIN — ASPIRIN 81 MG: 81 TABLET, CHEWABLE ORAL at 08:38

## 2024-01-23 RX ADMIN — SODIUM CHLORIDE, PRESERVATIVE FREE 40 MG: 5 INJECTION INTRAVENOUS at 08:37

## 2024-01-23 RX ADMIN — HYDRALAZINE HYDROCHLORIDE 10 MG: 20 INJECTION, SOLUTION INTRAMUSCULAR; INTRAVENOUS at 09:58

## 2024-01-23 RX ADMIN — ENOXAPARIN SODIUM 30 MG: 100 INJECTION SUBCUTANEOUS at 00:45

## 2024-01-23 RX ADMIN — SODIUM CHLORIDE, PRESERVATIVE FREE 40 MG: 5 INJECTION INTRAVENOUS at 00:46

## 2024-01-23 RX ADMIN — CARVEDILOL 25 MG: 12.5 TABLET, FILM COATED ORAL at 08:38

## 2024-01-23 RX ADMIN — ISOSORBIDE MONONITRATE 60 MG: 30 TABLET, EXTENDED RELEASE ORAL at 08:38

## 2024-01-23 RX ADMIN — LEVOFLOXACIN 750 MG: 5 INJECTION, SOLUTION INTRAVENOUS at 01:03

## 2024-01-23 RX ADMIN — POTASSIUM CHLORIDE 20 MEQ: 1500 TABLET, EXTENDED RELEASE ORAL at 08:38

## 2024-01-23 RX ADMIN — ACETAMINOPHEN 500 MG: 500 TABLET ORAL at 18:06

## 2024-01-23 RX ADMIN — ENOXAPARIN SODIUM 30 MG: 100 INJECTION SUBCUTANEOUS at 08:38

## 2024-01-23 RX ADMIN — IPRATROPIUM BROMIDE AND ALBUTEROL SULFATE 1 DOSE: .5; 3 SOLUTION RESPIRATORY (INHALATION) at 07:25

## 2024-01-23 RX ADMIN — Medication 400 MG: at 21:49

## 2024-01-23 RX ADMIN — ASPIRIN 81 MG: 81 TABLET, CHEWABLE ORAL at 21:50

## 2024-01-23 RX ADMIN — Medication 400 MG: at 08:38

## 2024-01-23 RX ADMIN — AMLODIPINE BESYLATE 5 MG: 5 TABLET ORAL at 09:58

## 2024-01-23 RX ADMIN — ACETAMINOPHEN 500 MG: 500 TABLET ORAL at 08:38

## 2024-01-23 RX ADMIN — BUMETANIDE 2 MG: 0.25 INJECTION INTRAMUSCULAR; INTRAVENOUS at 08:37

## 2024-01-23 RX ADMIN — IPRATROPIUM BROMIDE AND ALBUTEROL SULFATE 1 DOSE: .5; 3 SOLUTION RESPIRATORY (INHALATION) at 11:27

## 2024-01-23 RX ADMIN — BUDESONIDE 500 MCG: 0.5 INHALANT RESPIRATORY (INHALATION) at 07:30

## 2024-01-23 RX ADMIN — LABETALOL HYDROCHLORIDE 20 MG: 5 INJECTION INTRAVENOUS at 00:45

## 2024-01-23 RX ADMIN — BUDESONIDE 500 MCG: 0.5 INHALANT RESPIRATORY (INHALATION) at 20:00

## 2024-01-23 RX ADMIN — BUMETANIDE 2 MG: 0.25 INJECTION INTRAMUSCULAR; INTRAVENOUS at 21:55

## 2024-01-23 RX ADMIN — ENOXAPARIN SODIUM 30 MG: 100 INJECTION SUBCUTANEOUS at 21:49

## 2024-01-23 RX ADMIN — CARVEDILOL 25 MG: 12.5 TABLET, FILM COATED ORAL at 18:06

## 2024-01-23 ASSESSMENT — PAIN DESCRIPTION - LOCATION: LOCATION: HEAD

## 2024-01-23 NOTE — PROGRESS NOTES
249 Notified Dr Christensen about patient's blood pressure still being elevated in the 170's -180's  after receiving Labetalol to bring down.  251: Advised by Fredy Christensen to monitor for now.

## 2024-01-23 NOTE — PROGRESS NOTES
Speech LAnguage Pathology EVALUATION/DISCHARGE    Patient: Marilee Oakes (73 y.o. female)  Date: 1/23/2024  Primary Diagnosis: Acute pulmonary edema (HCC) [J81.0]  Heart failure (HCC) [I50.9]  Acute cystitis without hematuria [N30.00]  Other fatigue [R53.83]  Acute on chronic congestive heart failure, unspecified heart failure type (HCC) [I50.9]  COVID-19 [U07.1]       Precautions:                    ASSESSMENT :  Based on the objective data described below, the patient presents with a functional oropharyngeal swallow. Patient endorses history of reflux. Mastication assessed to be timely and complete. Patient tolerated single and sequential sips of thin liquid without difficulty. No overt signs of aspiration observed with any consistency trialed.    At this time, patient felt safe to initiate baseline diet of Regular/Thin Liquids. No further acute SLP needs identified at this time. Please re-consult if needs arise.    Patient will be discharged from skilled speech-language pathology services at this time.     PLAN :  Recommendations and Planned Interventions:  Diet: Regular and thin liquids  -- Medication whole in puree as is patient's baseline  -- Routine oral care, as able    Reflux precautions:  -- Upright for all PO intake  -- Remaining upright for at least 30-45 minutes after meals  -- Slow rate of intake  -- Avoiding spicy/acidic foods and carbonated drinks as needed  -- Alternating liquids/solids       Acute SLP Services: No, patient will be discharged from acute skilled speech-language pathology at this time.    Discharge Recommendations: No, additional SLP treatment not indicated at discharge     SUBJECTIVE:   Patient stated, “it feels like something is on the tip of my tongue.”    OBJECTIVE:     Past Medical History:   Diagnosis Date    Age-related osteoporosis without current pathological fracture     Anxiety and depression 01/22/2018    Arthritis     OA    Asthma     CAD (coronary artery disease)

## 2024-01-23 NOTE — PROGRESS NOTES
ADULT PROTOCOL: JET AEROSOL ASSESSMENT    Patient  Marilee Oakes     73 y.o.   female     1/23/2024  11:28 AM    Breath Sounds Pre Procedure: Breath Sounds Pre-Tx ADELE: Diminished                                  Breath Sounds Pre-Tx LLL: Diminished        Breath Sounds Pre-Tx RUL: Diminished        Breath Sounds Pre-Tx RML: Diminished        Breath Sounds Pre-Tx RLL: Diminished  Breath Sounds Post Procedure: Breath Sounds Post-Tx ADELE: Diminished          Breath Sounds Post-Tx LLL: Diminished          Breath Sounds Post-Tx RUL: Diminished          Breath Sounds Post-Tx RML: Diminished          Breath Sounds Post-Tx RLL: Diminished                                         SpO2:  SpO2: 98 %   with Oxygen                Nebulizer Therapy: Current medications Medications: Albuterol/Ipratropium      Changed: Yes  Nebulizer changed to home dose of BID      Problem List:   Patient Active Problem List   Diagnosis    Open wound of left lower leg    HBP (high blood pressure)    IBS (irritable bowel syndrome)    Foot pain, right    Ischemic cardiomyopathy    Anxiety    Ingrown toenail of left foot    Asthma    Polypharmacy    Long-term use of high-risk medication    Hypokalemia    Lethargy    Hypoglycemia    Acute exacerbation of COPD with asthma (HCC)    Obesity (BMI 30-39.9)    Hypoxia    Lower back pain    Chronic atrial fibrillation (HCC)    Hypercholesterolemia    Presence of Watchman left atrial appendage closure device    Atrial fibrillation (HCC)    Spinal stenosis, lumbar region, without neurogenic claudication    B12 deficiency    Lower abdominal pain    Type 2 diabetes mellitus with diabetic neuropathy, without long-term current use of insulin (HCC)    Right knee DJD    Recurrent UTI    Type 2 diabetes with nephropathy (HCC)    Ataxia    Lower extremity edema    Venous stasis dermatitis of both lower extremities    Closed fracture of multiple ribs of right side with routine healing    S/P placement of cardiac

## 2024-01-23 NOTE — CARE COORDINATION
Care Management Initial Assessment       RUR: 7  Readmission? Yes - 1/22/25.  DC from [facility] on 12/29/23 to [facility].  1st IM letter given? Yes - 01/22/24  1st  letter given: No    Plan is for Pt to return home with spouse with VASILE order to [facility].   -Therapy to see Pt tomorrow, 1/24/24.  CM will hold off on sending VASILE order until we know that is the rec from therapy.    Pt Spouse is caregiver and they live in one story home with ramp to enter in the back.   DME: CPAP, Oxygen through [employer]: 4 LPM via NC at Baseline. Rollator, WC, and Shower chair at home.   Pharmacy: [facility] on [address] for short term needs and [facility] for mail order long term med needs.   Spouse will transport her home in car.       Advance Care Planning     General Advance Care Planning (ACP) Conversation    Date of Conversation: 1/22/2024  Conducted with: Patient with Decision Making Capacity    Healthcare Decision Maker:    Primary Decision Maker: Wesley Oakes - Spouse - [phone]  Click here to complete Healthcare Decision Makers including selection of the Healthcare Decision Maker Relationship (ie \"Primary\").   Today we documented Decision Maker(s) consistent with ACP documents on file.    Content/Action Overview:  Has ACP document(s) on file - reflects the patient's care preferences  Reviewed DNR/DNI and patient elects DNR order - referred to ACP Clinical Specialist & placed order        Length of Voluntary ACP Conversation in minutes:  <16 minutes (Non-Billable)    Jaclyn Haro         01/23/24 1630   Service Assessment   Patient Orientation Alert and Oriented   Cognition Other (see comment)  (Sleeping so did Assessment with Spouse over the phone)   History Provided By Spouse   Primary Caregiver Spouse   Support Systems Spouse/Significant Other   Patient's Healthcare Decision Maker is: Named in Scanned ACP Document   PCP Verified by CM Yes   Last Visit to PCP Within last 3 months   Prior Functional  Level Assistance with the following:;Bathing;Dressing;Toileting;Cooking;Housework;Shopping   Current Functional Level Assistance with the following:;Bathing;Dressing;Toileting;Cooking;Housework;Shopping   Can patient return to prior living arrangement Yes   Ability to make needs known: Good   Family able to assist with home care needs: Yes   Social/Functional History   Lives With Spouse   Type of Home House   Home Layout One level   Home Access Ramped entrance  (ramped entrance in back)   Home Equipment Oxygen;Rollator;Wheelchair-manual  (CPAP. Oxygen through [employer] BL 4 LPM)   Receives Help From Family   Active  No   Patient's  Info Spouse: Wesley   Mode of Transportation Car   Occupation Retired   Discharge Planning   Type of Residence House   Living Arrangements Spouse/Significant Other   Current Services Prior To Admission Durable Medical Equipment   Current DME Prior to Arrival Cpap;Wheelchair;Walker;Shower Chair   Potential Assistance Needed Home Care  (VASILE with [facility])   DME Ordered? No   Potential Assistance Purchasing Medications No   Type of Home Care Services OT;PT;Nursing Services   Patient expects to be discharged to: House       Readmission Assessment  Number of Days since last admission?: 8-30 days  Previous Disposition: Home with Home Health (DC from [facility] on 1/20/24, home with spouse with [facility] )  Who is being Interviewed: Patient  What was the patient's/caregiver's perception as to why they think they needed to return back to the hospital?: Other (Comment) (Family thinks poor care at [facility] gave her Covid and a UTI.)  Did you visit your Primary Care Physician after you left the hospital, before you returned this time?: No (Just released from SNF on 1/20 and came to ER 1/22.)  Why weren't you able to visit your PCP?: Other (Comment) (Came to hospital before she could get to appt)  Did you see a specialist, such as Cardiac, Pulmonary, Orthopedic

## 2024-01-23 NOTE — PROGRESS NOTES
Physical Therapy services attempted @9:15AM. Upon arrival, Pt c/o \"eyes hurt\". RN Team appraised and arrived for follow up. Per cursory review of medical chart and communication with RN Team, agreed upon HOLD of therapy services at this time per continued elevated BP (191/112). PT team will follow as time permits and as medically appropriate to patient tolerance.    RN Team aware.    Madison Alejandro, PT, DPT

## 2024-01-23 NOTE — H&P
cavity size and wall thickness. Globally reduced systolic function.  LA: Moderately dilated left atrium. Left atrial appendage is completely occluded. A Watchman left atrial appendage occluder is present and completely occludes the appendage.    History of migraine     Hypercholesterolemia 01/22/2018    Irritable bowel syndrome     IBS, spinal stenosis    Long-term use of high-risk medication 01/22/2018    Lumbar spinal stenosis     Morbid (severe) obesity due to excess calories (HCC)     Neuropathy     Obesity (BMI 30-39.9) 04/30/2019    Obstructive sleep apnea     uses CPAP    Permanent atrial fibrillation (HCC)     Presence of implantable cardioverter-defibrillator (ICD)     Presence of Watchman left atrial appendage closure device     Type 2 diabetes mellitus with diabetic neuropathy, without long-term current use of insulin (HCC) 06/05/2016    Venous insufficiency     Vitamin B12 deficiency         Past Surgical History:   Procedure Laterality Date    APPENDECTOMY      BREAST BIOPSY Left     about 4-5 years ago from 2022, neg    CHOLECYSTECTOMY  11/15/2018    COLONOSCOPY N/A 3/14/2018    COLONOSCOPY performed by Pepito Quintero MD at [facility]    HYSTERECTOMY (CERVIX STATUS UNKNOWN)      IR KYPHOPLASTY LUMBAR FIRST LEVEL  12/22/2020    IR KYPHOPLASTY LUMBAR FIRST LEVEL 12/22/2020 [facility]    IR KYPHOPLASTY LUMBAR FIRST LEVEL  12/22/2020    IR KYPHOPLASTY THORACIC FIRST LEVEL  1/6/2021    IR KYPHOPLASTY THORACIC FIRST LEVEL 1/6/2021 [facility]    IR KYPHOPLASTY THORACIC FIRST LEVEL  1/6/2021    [facility] LOC BREAST BIOPSY RIGHT Right 5/10/2022    [facility] ST[facility]OT[facility] LOC BREAST BIOPSY RIGHT 5/10/2022 [facility]    PACEMAKER      [place] UNLISTED PROCEDURE CARDIAC SURGERY      stent       Social History     Tobacco Use    Smoking status: Never    Smokeless tobacco: Never   Substance Use Topics    Alcohol use: No        Family History   Problem Relation Age of Onset    Hypertension Mother     COPD  Child     COPD Brother     Hypertension Brother     High Cholesterol Brother     COPD Sister     Thyroid Disease Sister     Hypertension Sister     High Cholesterol Sister     Alcohol Abuse Father     Heart Disease Mother     Crohn's Disease Mother     Osteoarthritis Mother     Inflam Bowel Dis Child     High Cholesterol Mother        Allergies   Allergen Reactions    Sulfa Antibiotics Swelling    Amoxicillin Swelling        Prior to Admission medications    Medication Sig Start Date End Date Taking? Authorizing Provider   arformoterol tartrate (BROVANA) 15 MCG/2ML NEBU Take 2 mLs by nebulization in the morning and 2 mLs in the evening. 12/27/23   Grazyna Lim MD   budesonide (PULMICORT) 0.5 MG/2ML nebulizer suspension Take 2 mLs by nebulization in the morning and 2 mLs in the evening. 12/27/23   Grazyna Lim MD   aspirin 81 MG chewable tablet Take 1 tablet by mouth daily 12/28/23   Grazyna Lim MD   balsum peru-castor oil (VENELEX) OINT ointment Apply topically 2 times daily 12/27/23   Grazyna Lim MD   bumetanide (BUMEX) 2 MG tablet TAKE 1 TABLET EVERY DAY 12/21/23   Parish Dumont APRN - NP   omeprazole (PRILOSEC) 40 MG delayed release capsule TAKE 1 CAPSULE EVERY DAY 12/6/23   Parish Dumont APRN - NP   pregabalin (LYRICA) 300 MG capsule Take 1 capsule by mouth daily.    Provider, MD Angela   clonazePAM (KLONOPIN) 0.5 MG tablet TAKE 1 TABLET BY MOUTH NIGHTLY . DO NOT EXCEED 1 PER 24 HOURS 12/5/23 3/4/24  Parish Dumont APRN - NP   nystatin (MYCOSTATIN) 253645 UNIT/GM cream Apply topically 2 times daily. 12/5/23   Parish Dumont APRN - NP   isosorbide mononitrate (IMDUR) 60 MG extended release tablet TAKE 1 TABLET EVERY DAY 11/20/23   Parish Dumont APRN - NP   carvedilol (COREG) 25 MG tablet TAKE 1 TABLET TWICE DAILY WITH MEALS 11/20/23   Parish Dumont APRN - NP   triamcinolone (KENALOG) 0.1 % cream APPLY A THIN FILM OF CREAM

## 2024-01-23 NOTE — ED NOTES
Report given to AGNES Carrasco. Nurse was informed of reason for arrival, vitals, labs, medications, orders, procedures, results, anything left pending and current plan of action. Questions were asked and received prior to departure from the patient.

## 2024-01-23 NOTE — PROGRESS NOTES
Hospitalist Progress Note    NAME:   Marilee Oakes   : 1950   MRN: [id number]     Date/Time: 2024 2:52 PM  Patient PCP: Parish Dumont APRN - NP    Estimated discharge date:  Barriers:       Assessment / Plan:    CHF with ischemic and nonischemic cardiomyopathy   pulmonary edema  Acute on chronic systolic CHF  A.fib   Status post pacemaker  S/p Watchman implant   COVID infection  Continue with Bumex  D-dimer 3.8  Continue Coreg  Wean oxygen down as tolerated  Cardio input appreciated  Continue with baby aspirin twice daily      UTI  Urine culture gram-negative rods  Continue with Levaquin      Hypertensive urgency  Resume oral home medication medication  Continue with Bumex 2 mg twice daily  Increase amlodipine to 10 mg daily  Hydralazine as needed     COPD  Continue with home inhalers with pharmacy substitution     CKD stage III  Creatinine at baseline     Diabetes mellitus with neuropathy  Please start sliding scale insulin     LEELA on CPAP  Will order CPAP at bedtime     History of recent Staph aureus bacteremia 2023  JUAN MANUEL no evidence of endocarditis    Hypernatremia  Resolved    Medical Decision Making:   I personally reviewed labs:yes  I personally reviewed imaging:yes  I personally reviewed EKG:yes  Toxic drug monitoring: yes  Discussed case with: yes        Code Status: DNR  DVT Prophylaxis: Lovenox  GI Prophylaxis: Protonix    Subjective:     Chief Complaint / Reason for Physician Visit  \"Patient seen and examined, feeling better, more awake, passed bedside swallow eval, started on diet\".  Discussed with RN events overnight.       Objective:     VITALS:   Last 24hrs VS reviewed since prior progress note. Most recent are:  Patient Vitals for the past 24 hrs:   BP Temp Temp src Pulse Resp SpO2 Weight   24 1100 128/72 98.3 °F (36.8 °C) Oral 80 16 98 % --   24 1030 131/68 -- -- 80 18 -- --   24 1015 126/76 -- -- 80 17 -- --   24 1000 (!) 148/84 -- -- 80 16 -- --  lab test results and cultures  YES  Reviewed most current radiology test results   YES  Review and summation of old records today    NO  Reviewed patient's current orders and MAR    YES  PMH/SH reviewed - no change compared to H&P    Procedures: see electronic medical records for all procedures/Xrays and details which were not copied into this note but were reviewed prior to creation of Plan.      LABS:  I reviewed today's most current labs and imaging studies.  Pertinent labs include:  Recent Labs     01/22/24  1732 01/23/24  0121   WBC 4.4 4.0   HGB 9.6* 9.9*   HCT 34.8* 36.0    197     Recent Labs     01/22/24  1732 01/23/24  0121   * 142   K 4.3 3.4*   * 109*   CO2 29 31   GLUCOSE 102* 112*   BUN 17 15   CREATININE 1.29* 1.29*   CALCIUM 8.8 8.9   MG 1.9 1.8   LABALBU 2.7*  --    BILITOT 0.5  --    AST 10*  --    ALT 12  --        Signed: Marybeth Ballesteros MD

## 2024-01-23 NOTE — CONSULTS
EP/ ARRHYTHMIA CONSULT requested secondary to CHF with BIV pacing    Patient ID:  Patient: Marilee Oakes  MRN: [id number]  Age: 73 y.o.  : 1950    Date of  Admission: 2024  4:28 PM   PCP:  Parish Dumont APRN - NP  Usual cardiologist:  Bruce Abbott MD    Assessment:   Acute on chronic systolic CHF.  Echo 2023 EF 50-55%, moderate aortic valve stenosis.  EF improved with BIV pacing and medication.    Net IO Since Admission: -1,540 mL [24 1125]    Patient Vitals for the past 96 hrs (Last 3 readings):   Weight   24 1630 105.4 kg (232 lb 5.8 oz)       COVID infection.  Recent MRSA bacteremia 2023.  JUAN MANUEL without evidence of bacterial endocarditis.  Permanent atrial fibrillation.  Remote [facility] [facility] [facility] BIV-ICD upgrade and AV node ablation in 10/2018, she is pacemaker-dependent.  She has a retained disconnected/capped RV pacing lead--so MRI incompatible.  Chronic mixed ischemic/nonischemic cardiomyopathy EF 35-40% in 2020, old anterior infarct.  This improved with BIV pacing and medication.  Hypertensive heart disease with heart failure and CKD stage 3.  Lower extremity venous insufficiency with venous ablation in .  History of LLE ulcer.  Has right foot ulcer.  DM type 2 without chronic insulin.  Diabetic neuropathy.  Watchman implant in , transitioned from AC to DAPT and then ASA monotherapy.  DNR.     Plan:     Continue IV diuresis.  Noted is that she has shock therapies enabled in the setting of DNR.  I don't think any BIV pacing reprogramming is needed at this time.  Continue carvedilol.  Increase amlodipine to 10 mg daily.  Increase baby ASA to TWICE DAILY.  Urine culture pending.        [x]       High complexity decision making was performed in this patient at high risk for decompensation with multiple organ involvement.    Marilee Oakes is a 73 y.o. female with a history of the above.  I was asked to see her for her cardiology issues including  Brother     High Cholesterol Brother     COPD Sister     Thyroid Disease Sister     Hypertension Sister     High Cholesterol Sister     Alcohol Abuse Father     Heart Disease Mother     Crohn's Disease Mother     Osteoarthritis Mother     Inflam Bowel Dis Child     High Cholesterol Mother         Allergies   Allergen Reactions    Sulfa Antibiotics Swelling    Amoxicillin Swelling          Current Facility-Administered Medications   Medication Dose Route Frequency    [START ON 1/24/2024] amLODIPine (NORVASC) tablet 10 mg  10 mg Oral Daily    labetalol (NORMODYNE;TRANDATE) injection syringe 20 mg  20 mg IntraVENous Q4H PRN    levoFLOXacin (LEVAQUIN) 750 MG/150ML infusion 750 mg  750 mg IntraVENous Q24H    acetaminophen (TYLENOL) tablet 500 mg  500 mg Oral Q6H PRN    ipratropium 0.5 mg-albuterol 2.5 mg (DUONEB) nebulizer solution 1 Dose  1 Dose Inhalation Q4H WA RT    aspirin chewable tablet 81 mg  81 mg Oral Daily    budesonide (PULMICORT) nebulizer suspension 500 mcg  0.5 mg Nebulization BID RT    bumetanide (BUMEX) injection 2 mg  2 mg IntraVENous BID    carvedilol (COREG) tablet 25 mg  25 mg Oral BID with meals    isosorbide mononitrate (IMDUR) extended release tablet 60 mg  60 mg Oral Daily    magnesium oxide (MAG-OX) tablet 400 mg  400 mg Oral BID    pantoprazole (PROTONIX) 40 mg in sodium chloride (PF) 0.9 % 10 mL injection  40 mg IntraVENous Daily    potassium chloride (KLOR-CON M) extended release tablet 20 mEq  20 mEq Oral BID    [Held by provider] pregabalin (LYRICA) capsule 300 mg  300 mg Oral Daily    [Held by provider] venlafaxine (EFFEXOR XR) extended release capsule 150 mg  150 mg Oral BID WC    sodium chloride flush 0.9 % injection 5-40 mL  5-40 mL IntraVENous 2 times per day    sodium chloride flush 0.9 % injection 5-40 mL  5-40 mL IntraVENous PRN    0.9 % sodium chloride infusion   IntraVENous PRN    enoxaparin Sodium (LOVENOX) injection 30 mg  30 mg SubCUTAneous BID    ondansetron (ZOFRAN-ODT)

## 2024-01-23 NOTE — H&P
Occupational Therapy    Referral received, chart reviewed and noted patient's BP is currently 119/112. Will defer OT evaluation and follow back pending control of BP.     Tom Vargas, OTR/L

## 2024-01-23 NOTE — ED NOTES
Report received from AGNES Alvarez. Reviewed reason for patient arrival, vitals, labs, medications, orders, procedures, results, pending orders/results and current plan for disposition. Questions were asked and answered prior to departure.

## 2024-01-24 LAB
ANION GAP SERPL CALC-SCNC: 3 MMOL/L (ref 5–15)
BACTERIA SPEC CULT: ABNORMAL
BUN SERPL-MCNC: 13 MG/DL (ref 6–20)
BUN/CREAT SERPL: 9 (ref 12–20)
CALCIUM SERPL-MCNC: 9.1 MG/DL (ref 8.5–10.1)
CC UR VC: ABNORMAL
CHLORIDE SERPL-SCNC: 103 MMOL/L (ref 97–108)
CO2 SERPL-SCNC: 36 MMOL/L (ref 21–32)
CREAT SERPL-MCNC: 1.42 MG/DL (ref 0.55–1.02)
ERYTHROCYTE [DISTWIDTH] IN BLOOD BY AUTOMATED COUNT: 17 % (ref 11.5–14.5)
GLUCOSE BLD STRIP.AUTO-MCNC: 123 MG/DL (ref 65–117)
GLUCOSE BLD STRIP.AUTO-MCNC: 146 MG/DL (ref 65–117)
GLUCOSE BLD STRIP.AUTO-MCNC: 166 MG/DL (ref 65–117)
GLUCOSE SERPL-MCNC: 137 MG/DL (ref 65–100)
HCT VFR BLD AUTO: 39.6 % (ref 35–47)
HGB BLD-MCNC: 11.2 G/DL (ref 11.5–16)
MCH RBC QN AUTO: 23.2 PG (ref 26–34)
MCHC RBC AUTO-ENTMCNC: 28.3 G/DL (ref 30–36.5)
MCV RBC AUTO: 82 FL (ref 80–99)
NRBC # BLD: 0 K/UL (ref 0–0.01)
NRBC BLD-RTO: 0 PER 100 WBC
PLATELET # BLD AUTO: 224 K/UL (ref 150–400)
PMV BLD AUTO: 10.9 FL (ref 8.9–12.9)
POTASSIUM SERPL-SCNC: 3.1 MMOL/L (ref 3.5–5.1)
RBC # BLD AUTO: 4.83 M/UL (ref 3.8–5.2)
SERVICE CMNT-IMP: ABNORMAL
SODIUM SERPL-SCNC: 142 MMOL/L (ref 136–145)
WBC # BLD AUTO: 3.4 K/UL (ref 3.6–11)

## 2024-01-24 PROCEDURE — 6370000000 HC RX 637 (ALT 250 FOR IP): Performed by: INTERNAL MEDICINE

## 2024-01-24 PROCEDURE — 6370000000 HC RX 637 (ALT 250 FOR IP): Performed by: HOSPITALIST

## 2024-01-24 PROCEDURE — 6370000000 HC RX 637 (ALT 250 FOR IP): Performed by: STUDENT IN AN ORGANIZED HEALTH CARE EDUCATION/TRAINING PROGRAM

## 2024-01-24 PROCEDURE — 85027 COMPLETE CBC AUTOMATED: CPT

## 2024-01-24 PROCEDURE — 76937 US GUIDE VASCULAR ACCESS: CPT

## 2024-01-24 PROCEDURE — 2580000003 HC RX 258: Performed by: INTERNAL MEDICINE

## 2024-01-24 PROCEDURE — C9113 INJ PANTOPRAZOLE SODIUM, VIA: HCPCS | Performed by: INTERNAL MEDICINE

## 2024-01-24 PROCEDURE — A4216 STERILE WATER/SALINE, 10 ML: HCPCS | Performed by: INTERNAL MEDICINE

## 2024-01-24 PROCEDURE — 97166 OT EVAL MOD COMPLEX 45 MIN: CPT

## 2024-01-24 PROCEDURE — 36415 COLL VENOUS BLD VENIPUNCTURE: CPT

## 2024-01-24 PROCEDURE — 97161 PT EVAL LOW COMPLEX 20 MIN: CPT

## 2024-01-24 PROCEDURE — 6360000002 HC RX W HCPCS: Performed by: INTERNAL MEDICINE

## 2024-01-24 PROCEDURE — 80048 BASIC METABOLIC PNL TOTAL CA: CPT

## 2024-01-24 PROCEDURE — 2700000000 HC OXYGEN THERAPY PER DAY

## 2024-01-24 PROCEDURE — 2060000000 HC ICU INTERMEDIATE R&B

## 2024-01-24 PROCEDURE — 97530 THERAPEUTIC ACTIVITIES: CPT

## 2024-01-24 PROCEDURE — 94640 AIRWAY INHALATION TREATMENT: CPT

## 2024-01-24 PROCEDURE — 97535 SELF CARE MNGMENT TRAINING: CPT

## 2024-01-24 PROCEDURE — 82962 GLUCOSE BLOOD TEST: CPT

## 2024-01-24 RX ORDER — LEVOFLOXACIN 750 MG/1
750 TABLET, FILM COATED ORAL ONCE
Status: COMPLETED | OUTPATIENT
Start: 2024-01-24 | End: 2024-01-24

## 2024-01-24 RX ORDER — LACTULOSE 10 G/15ML
20 SOLUTION ORAL ONCE
Status: COMPLETED | OUTPATIENT
Start: 2024-01-24 | End: 2024-01-24

## 2024-01-24 RX ADMIN — IPRATROPIUM BROMIDE AND ALBUTEROL SULFATE 1 DOSE: 2.5; .5 SOLUTION RESPIRATORY (INHALATION) at 09:04

## 2024-01-24 RX ADMIN — POTASSIUM CHLORIDE 20 MEQ: 1500 TABLET, EXTENDED RELEASE ORAL at 21:08

## 2024-01-24 RX ADMIN — BUDESONIDE 500 MCG: 0.5 INHALANT RESPIRATORY (INHALATION) at 09:03

## 2024-01-24 RX ADMIN — Medication 400 MG: at 09:39

## 2024-01-24 RX ADMIN — SODIUM CHLORIDE, PRESERVATIVE FREE 10 ML: 5 INJECTION INTRAVENOUS at 11:02

## 2024-01-24 RX ADMIN — LEVOFLOXACIN 750 MG: 750 TABLET, FILM COATED ORAL at 01:31

## 2024-01-24 RX ADMIN — Medication 400 MG: at 21:08

## 2024-01-24 RX ADMIN — SODIUM CHLORIDE, PRESERVATIVE FREE 10 ML: 5 INJECTION INTRAVENOUS at 21:09

## 2024-01-24 RX ADMIN — CARVEDILOL 25 MG: 12.5 TABLET, FILM COATED ORAL at 17:32

## 2024-01-24 RX ADMIN — ISOSORBIDE MONONITRATE 60 MG: 30 TABLET, EXTENDED RELEASE ORAL at 09:38

## 2024-01-24 RX ADMIN — ENOXAPARIN SODIUM 30 MG: 100 INJECTION SUBCUTANEOUS at 09:39

## 2024-01-24 RX ADMIN — IPRATROPIUM BROMIDE AND ALBUTEROL SULFATE 1 DOSE: 2.5; .5 SOLUTION RESPIRATORY (INHALATION) at 20:59

## 2024-01-24 RX ADMIN — ASPIRIN 81 MG: 81 TABLET, CHEWABLE ORAL at 21:08

## 2024-01-24 RX ADMIN — POTASSIUM CHLORIDE 20 MEQ: 1500 TABLET, EXTENDED RELEASE ORAL at 09:39

## 2024-01-24 RX ADMIN — ONDANSETRON 4 MG: 2 INJECTION INTRAMUSCULAR; INTRAVENOUS at 17:32

## 2024-01-24 RX ADMIN — BUDESONIDE 500 MCG: 0.5 INHALANT RESPIRATORY (INHALATION) at 20:59

## 2024-01-24 RX ADMIN — AMLODIPINE BESYLATE 10 MG: 5 TABLET ORAL at 09:38

## 2024-01-24 RX ADMIN — ASPIRIN 81 MG: 81 TABLET, CHEWABLE ORAL at 09:39

## 2024-01-24 RX ADMIN — ACETAMINOPHEN 500 MG: 500 TABLET ORAL at 10:24

## 2024-01-24 RX ADMIN — BUMETANIDE 2 MG: 0.25 INJECTION INTRAMUSCULAR; INTRAVENOUS at 11:01

## 2024-01-24 RX ADMIN — SODIUM CHLORIDE, PRESERVATIVE FREE 40 MG: 5 INJECTION INTRAVENOUS at 11:02

## 2024-01-24 RX ADMIN — CARVEDILOL 25 MG: 12.5 TABLET, FILM COATED ORAL at 09:39

## 2024-01-24 RX ADMIN — LACTULOSE 20 G: 20 SOLUTION ORAL at 17:32

## 2024-01-24 RX ADMIN — ONDANSETRON 4 MG: 4 TABLET, ORALLY DISINTEGRATING ORAL at 09:39

## 2024-01-24 RX ADMIN — BUMETANIDE 2 MG: 0.25 INJECTION INTRAMUSCULAR; INTRAVENOUS at 21:09

## 2024-01-24 RX ADMIN — ACETAMINOPHEN 500 MG: 500 TABLET ORAL at 01:31

## 2024-01-24 RX ADMIN — ENOXAPARIN SODIUM 30 MG: 100 INJECTION SUBCUTANEOUS at 21:10

## 2024-01-24 ASSESSMENT — PAIN SCALES - GENERAL
PAINLEVEL_OUTOF10: 3
PAINLEVEL_OUTOF10: 3

## 2024-01-24 ASSESSMENT — PAIN DESCRIPTION - DESCRIPTORS
DESCRIPTORS: ACHING
DESCRIPTORS: ACHING

## 2024-01-24 ASSESSMENT — PAIN DESCRIPTION - LOCATION
LOCATION: HEAD
LOCATION: HEAD

## 2024-01-24 ASSESSMENT — PAIN DESCRIPTION - ORIENTATION: ORIENTATION: ANTERIOR

## 2024-01-24 NOTE — CARE COORDINATION
Transition of Care Plan:    RUR: 25%   Prior Level of Functioning: assistance with ADLs/IADLs  Disposition: home with [facility]   If SNF or IPR: Date FOC offered:   Date FOC received:   Accepting facility:   Date authorization started with reference number:   Date authorization received and expires:   Follow up appointments: PCP,Specialists  DME needed: Pt has home o2, CPAP, rollator, and  w/c  Transportation at discharge: spouse  IM/IMM Medicare/ letter given: needed at d/c  Is patient a [place] and connected with [facility]?    If yes, was [place] transfer form completed and [facility] notified?   Caregiver Contact:     Wesley Oakes (Spouse)  [phone] (Mobile)     Discharge Caregiver contacted prior to discharge?   Care Conference needed?   Barriers to discharge:     CM received an email from [facility] requesting clinicals.  CM sent via [employer].      Camille Arevalo LMSW  Supervisee in Social Work  Care Management, [facility]  [phone]

## 2024-01-24 NOTE — PLAN OF CARE
Problem: Respiratory - Adult  Goal: Achieves optimal ventilation and oxygenation  1/23/2024 2004 by Jo Ann Oakes RCP  Outcome: Progressing  1/23/2024 0934 by Marissa Martinez, RT  Outcome: Progressing

## 2024-01-24 NOTE — PROGRESS NOTES
Hospitalist Progress Note    NAME:   Marilee Oakes   : 1950   MRN: [id number]     Date/Time: 2024 2:09 PM  Patient PCP: Parish Dumont APRN - NP    Estimated discharge date:   Barriers: mentation improvement      Assessment / Plan:    CHF with ischemic and nonischemic cardiomyopathy   pulmonary edema (4L at home)  Acute on chronic systolic CHF  A.fib   Status post pacemaker  S/p Watchman implant   COVID infection  Continue with Bumex  D-dimer 3.8  Continue Coreg  Wean oxygen down as tolerated  Cardio input appreciated  Continue with baby aspirin twice daily  Currently on 2 L nasal cannula      UTI  Urine culture gram-negative rods  Continue with Levaquin IV day 2      Hypertensive urgency  Resume oral home medication medication  Continue with Bumex 2 mg twice daily  Increase amlodipine to 10 mg daily  Hydralazine as needed     COPD  Continue with home inhalers with pharmacy substitution     CKD stage III  Creatinine at baseline     Diabetes mellitus with neuropathy  Please start sliding scale insulin     LEELA on CPAP  Will order CPAP at bedtime     History of recent Staph aureus bacteremia 2023  JUAN MANUEL no evidence of endocarditis    Hypernatremia  Resolved    Medical Decision Making:   I personally reviewed labs:yes  I personally reviewed imaging:yes  I personally reviewed EKG:yes  Toxic drug monitoring: yes  Discussed case with: yes        Code Status: DNR  DVT Prophylaxis: Lovenox  GI Prophylaxis: Protonix    Subjective:     Chief Complaint / Reason for Physician Visit  \"Patient seen and examined, feeling better, no shortness of breath, had confusion episode in the morning, removed her IV, better when I saw her on round.  Discussed with RN events overnight.       Objective:     VITALS:   Last 24hrs VS reviewed since prior progress note. Most recent are:  Patient Vitals for the past 24 hrs:   BP Temp Temp src Pulse Resp SpO2   24 1209 112/78 97.8 °F (36.6 °C) Oral 80 22 94 %    BUN 17 15   CREATININE 1.29* 1.29*   CALCIUM 8.8 8.9   MG 1.9 1.8   LABALBU 2.7*  --    BILITOT 0.5  --    AST 10*  --    ALT 12  --          Signed: Marybeth Ballesteros MD

## 2024-01-24 NOTE — PROGRESS NOTES
EP/ ARRHYTHMIA Progress Note    Patient ID:  Patient: Marilee Oakes  MRN: [id number]  Age: 73 y.o.  : 1950    Date of  Admission: 2024  4:28 PM   PCP:  Parish Dumont APRN - NP  Usual cardiologist:  Bruce Abbott MD    Assessment:   Acute on chronic systolic CHF.  Echo 2023 EF 50-55%, moderate aortic valve stenosis.  EF improved with BIV pacing and medication.    Net IO Since Admission: -4,080 mL [24 1816]    Patient Vitals for the past 96 hrs (Last 3 readings):   Weight   24 1630 105.4 kg (232 lb 5.8 oz)         COVID infection.  Recent MRSA bacteremia 2023.  JUAN MANUEL without evidence of bacterial endocarditis.  Permanent atrial fibrillation.  Remote [employer] BIV-ICD upgrade and AV node ablation in 10/2018, she is pacemaker-dependent.  She has a retained disconnected/capped RV pacing lead--so MRI incompatible.  Chronic mixed ischemic/nonischemic cardiomyopathy EF 35-40% in 2020, old anterior infarct.  This improved with BIV pacing and medication.  Hypertensive heart disease with heart failure and CKD stage 3.  Lower extremity venous insufficiency with venous ablation in .  History of LLE ulcer.  Has right foot ulcer.  DM type 2 without chronic insulin.  Diabetic neuropathy.  Watchman implant in , transitioned from AC to DAPT and then ASA monotherapy.  Klebsiella pneumoniae UTI.  DNR.     Plan:     Continue IV diuresis.  Noted is that she has shock therapies enabled in the setting of DNR.  I don't think any BIV pacing reprogramming is needed at this time.  Continue carvedilol.  Increased amlodipine to 10 mg daily.  Increased baby ASA to TWICE DAILY.        [x]       High complexity decision making was performed in this patient at high risk for decompensation with multiple organ involvement.    Marilee Oakes is a 73 y.o. female with a history of the above.  I was asked to see her for her cardiology issues including CHF in the setting of COVID

## 2024-01-24 NOTE — PLAN OF CARE
Spouse  Type of Home: House  Home Layout: One level  Home Access: Stairs to enter with rails, Ramped entrance (ramped entrance per chart)  Entrance Stairs - Number of Steps: 2  Entrance Stairs - Rails: Left  Bathroom Equipment: Shower chair  Home Equipment: Walker, 4 wheeled, Oxygen, Wheelchair-manual  Receives Help From: Family  Active : No  Patient's  Info: Spouse: Wesley  Mode of Transportation: Car  Occupation: Retired    Cognitive/Behavioral Status:  Orientation  Orientation Level: Oriented to place;Oriented to person  Cognition  Overall Cognitive Status: Exceptions  Following Commands: Follows one step commands with repetition;Follows one step commands with increased time  Attention Span: Attends with cues to redirect  Problem Solving: Decreased awareness of errors;Assistance required to generate solutions  Initiation: Requires cues for some  Sequencing: Requires cues for some    Skin: appears intact    Edema: none noted    Hearing:   Hearing  Hearing: Within functional limits    Vision/Perceptual:          Vision  Vision: Within Functional Limits       Strength:    Strength: Generally decreased, functional    Tone & Sensation:   Tone: Normal  Sensation: Intact    Coordination:  Coordination: Generally decreased, functional    Range Of Motion:  AROM: Generally decreased, functional       Functional Mobility:  Bed Mobility:     Bed Mobility Training  Bed Mobility Training: Yes  Interventions: Verbal cues;Tactile cues  Sit to Supine: Moderate assistance  Transfers:     Transfer Training  Transfer Training: Yes (with RW)  Sit to Stand: Minimum assistance  Stand to Sit: Minimum assistance  Bed to Chair: Minimum assistance  Balance:               Balance  Sitting: Intact  Standing: Impaired  Standing - Static: Fair;Constant support  Standing - Dynamic: Constant support;Fair  Ambulation/Gait Training:                       Gait  Overall Level of Assistance: Minimum assistance;Assist X1  Distance (ft): 10  AM-PAC  MEDIUM   Based on the above components, the patient evaluation is determined to be of the following complexity level: Low

## 2024-01-24 NOTE — PROGRESS NOTES
Bedside and Verbal shift change report given to Janny RN (oncoming nurse) by ANN Bowser RN (offgoing nurse).  Report given with SBAR, Kardex, Intake/Output, MAR and Recent Results.

## 2024-01-24 NOTE — PLAN OF CARE
Problem: Occupational Therapy - Adult  Goal: By Discharge: Performs self-care activities at highest level of function for planned discharge setting.  See evaluation for individualized goals.  Description: FUNCTIONAL STATUS PRIOR TO ADMISSION:  Pt was recently admitted, December 2023, with recommendation for SNF, however spouse ended up taking pt home at a x1 assist level. Unclear if spouse was still assisting pt PTA.      HOME SUPPORT: Patient lived with spouse. Unclear if pt still required assistance from spouse PTA/.     Occupational Therapy Goals:  Initiated 1/24/2024  1.  Patient will perform grooming in stance with Contact Guard Assist within 7 day(s).  2.  Patient will perform lower body dressing with Minimal Assist within 7 day(s).  3.  Patient will perform upper body dressing with Set-up within 7 day(s).  4.  Patient will perform toilet transfers with Contact Guard Assist  within 7 day(s).  5.  Patient will perform all aspects of toileting with Minimal Assist within 7 day(s).  6.  Patient will complete supine<>sit with Minimal Assist  within 7 day(s).        Outcome: Progressing   OCCUPATIONAL THERAPY EVALUATION    Patient: Marilee Oakes (73 y.o. female)  Date: 1/24/2024  Primary Diagnosis: Acute pulmonary edema (HCC) [J81.0]  Heart failure (HCC) [I50.9]  Acute cystitis without hematuria [N30.00]  Other fatigue [R53.83]  Acute on chronic congestive heart failure, unspecified heart failure type (HCC) [I50.9]  COVID-19 [U07.1]         Precautions: Fall Risk, Isolation (droplet)                  ASSESSMENT :  The patient is limited by decreased functional mobility, independence in ADLs, high-level IADLs, ROM, strength, body mechanics, activity tolerance, endurance, safety awareness, cognition, command following, attention/concentration, coordination, balance, posture, fine-motor control.    Based on the impairments listed above pt is currently requiring up to min/mod assist for functional mobility and set  cleanser    LE Bathing: Minimal assistance       UE Dressing: Stand by assistance       LE Dressing: Moderate assistance       Toileting: Moderate assistance            Functional Mobility: Minimal assistance;Adaptive equipment  Functional Mobility Skilled Clinical Factors: with RW         ADL Intervention and task modifications:          [facility] \"6 Clicks\"                                                       Daily Activity Inpatient Short Form  How much help from another person does the patient currently need... Total; A Lot A Little None   1.  Putting on and taking off regular lower body clothing? []  1 [x]  2 []  3 []  4   2.  Bathing (including washing, rinsing, drying)? []  1 [x]  2 []  3 []  4   3.  Toileting, which includes using toilet, bedpan or urinal? [] 1 [x]  2 []  3 []  4   4.  Putting on and taking off regular upper body clothing? []  1 []  2 [x]  3 []  4   5.  Taking care of personal grooming such as brushing teeth? []  1 []  2 []  3 [x]  4   6.  Eating meals? []  1 []  2 []  3 [x]  4   © , Trustees of [facility], under license to [employer]. All rights reserved     Score: 17/24     Interpretation of Tool:  Represents clinically-significant functional categories (i.e. Activities of daily living).    Cutoff score 39.4 (19) correlates to a good likelihood of discharging home versus a facility  Mariela Mendez, Марина Bonner, Angelo Capps, Radha Zendejas, Allen Franco, Audie Mendez, AM-PAC “6-Clicks” Functional Assessment Scores Predict Acute Care Hospital Discharge Destination, Physical Therapy, Volume 94, Issue 9, 2014, Pages 2854-1443, [email or web address]    Pain Ratin/10   Pain Intervention(s):       Activity Tolerance:   Fair     After treatment:   Patient left in no apparent distress in bed, Call bell within reach, Bed/ chair alarm activated, and Side rails x3    COMMUNICATION/EDUCATION:   The patient's plan of

## 2024-01-25 LAB
ANION GAP SERPL CALC-SCNC: 3 MMOL/L (ref 5–15)
BUN SERPL-MCNC: 11 MG/DL (ref 6–20)
BUN/CREAT SERPL: 8 (ref 12–20)
CALCIUM SERPL-MCNC: 9.4 MG/DL (ref 8.5–10.1)
CHLORIDE SERPL-SCNC: 104 MMOL/L (ref 97–108)
CO2 SERPL-SCNC: 36 MMOL/L (ref 21–32)
CREAT SERPL-MCNC: 1.38 MG/DL (ref 0.55–1.02)
ERYTHROCYTE [DISTWIDTH] IN BLOOD BY AUTOMATED COUNT: 16.9 % (ref 11.5–14.5)
GLUCOSE BLD STRIP.AUTO-MCNC: 118 MG/DL (ref 65–117)
GLUCOSE BLD STRIP.AUTO-MCNC: 126 MG/DL (ref 65–117)
GLUCOSE BLD STRIP.AUTO-MCNC: 128 MG/DL (ref 65–117)
GLUCOSE BLD STRIP.AUTO-MCNC: 146 MG/DL (ref 65–117)
GLUCOSE SERPL-MCNC: 118 MG/DL (ref 65–100)
HCT VFR BLD AUTO: 37.3 % (ref 35–47)
HGB BLD-MCNC: 10.4 G/DL (ref 11.5–16)
MCH RBC QN AUTO: 23.1 PG (ref 26–34)
MCHC RBC AUTO-ENTMCNC: 27.9 G/DL (ref 30–36.5)
MCV RBC AUTO: 82.9 FL (ref 80–99)
NRBC # BLD: 0 K/UL (ref 0–0.01)
NRBC BLD-RTO: 0 PER 100 WBC
PLATELET # BLD AUTO: 254 K/UL (ref 150–400)
PMV BLD AUTO: 11.7 FL (ref 8.9–12.9)
POTASSIUM SERPL-SCNC: 3.1 MMOL/L (ref 3.5–5.1)
RBC # BLD AUTO: 4.5 M/UL (ref 3.8–5.2)
SERVICE CMNT-IMP: ABNORMAL
SODIUM SERPL-SCNC: 143 MMOL/L (ref 136–145)
WBC # BLD AUTO: 3.6 K/UL (ref 3.6–11)

## 2024-01-25 PROCEDURE — 80048 BASIC METABOLIC PNL TOTAL CA: CPT

## 2024-01-25 PROCEDURE — 6370000000 HC RX 637 (ALT 250 FOR IP): Performed by: INTERNAL MEDICINE

## 2024-01-25 PROCEDURE — 2580000003 HC RX 258: Performed by: INTERNAL MEDICINE

## 2024-01-25 PROCEDURE — 36415 COLL VENOUS BLD VENIPUNCTURE: CPT

## 2024-01-25 PROCEDURE — 94640 AIRWAY INHALATION TREATMENT: CPT

## 2024-01-25 PROCEDURE — 6370000000 HC RX 637 (ALT 250 FOR IP): Performed by: STUDENT IN AN ORGANIZED HEALTH CARE EDUCATION/TRAINING PROGRAM

## 2024-01-25 PROCEDURE — A4216 STERILE WATER/SALINE, 10 ML: HCPCS | Performed by: INTERNAL MEDICINE

## 2024-01-25 PROCEDURE — C9113 INJ PANTOPRAZOLE SODIUM, VIA: HCPCS | Performed by: INTERNAL MEDICINE

## 2024-01-25 PROCEDURE — 6360000002 HC RX W HCPCS: Performed by: INTERNAL MEDICINE

## 2024-01-25 PROCEDURE — 2060000000 HC ICU INTERMEDIATE R&B

## 2024-01-25 PROCEDURE — 6370000000 HC RX 637 (ALT 250 FOR IP): Performed by: HOSPITALIST

## 2024-01-25 PROCEDURE — 2700000000 HC OXYGEN THERAPY PER DAY

## 2024-01-25 PROCEDURE — 85027 COMPLETE CBC AUTOMATED: CPT

## 2024-01-25 PROCEDURE — 94760 N-INVAS EAR/PLS OXIMETRY 1: CPT

## 2024-01-25 PROCEDURE — 82962 GLUCOSE BLOOD TEST: CPT

## 2024-01-25 RX ORDER — POTASSIUM CHLORIDE 750 MG/1
10 TABLET, FILM COATED, EXTENDED RELEASE ORAL ONCE
Status: COMPLETED | OUTPATIENT
Start: 2024-01-25 | End: 2024-01-25

## 2024-01-25 RX ORDER — POTASSIUM CHLORIDE 750 MG/1
40 TABLET, FILM COATED, EXTENDED RELEASE ORAL ONCE
Status: COMPLETED | OUTPATIENT
Start: 2024-01-25 | End: 2024-01-25

## 2024-01-25 RX ORDER — LEVOFLOXACIN 750 MG/1
750 TABLET, FILM COATED ORAL
Status: DISCONTINUED | OUTPATIENT
Start: 2024-01-27 | End: 2024-01-27 | Stop reason: HOSPADM

## 2024-01-25 RX ADMIN — ISOSORBIDE MONONITRATE 60 MG: 30 TABLET, EXTENDED RELEASE ORAL at 09:10

## 2024-01-25 RX ADMIN — Medication 400 MG: at 09:10

## 2024-01-25 RX ADMIN — ENOXAPARIN SODIUM 30 MG: 100 INJECTION SUBCUTANEOUS at 09:10

## 2024-01-25 RX ADMIN — CARVEDILOL 25 MG: 12.5 TABLET, FILM COATED ORAL at 18:51

## 2024-01-25 RX ADMIN — ASPIRIN 81 MG: 81 TABLET, CHEWABLE ORAL at 20:32

## 2024-01-25 RX ADMIN — POTASSIUM CHLORIDE 40 MEQ: 750 TABLET, FILM COATED, EXTENDED RELEASE ORAL at 11:22

## 2024-01-25 RX ADMIN — SODIUM CHLORIDE, PRESERVATIVE FREE 40 MG: 5 INJECTION INTRAVENOUS at 09:10

## 2024-01-25 RX ADMIN — ASPIRIN 81 MG: 81 TABLET, CHEWABLE ORAL at 09:10

## 2024-01-25 RX ADMIN — CARVEDILOL 25 MG: 12.5 TABLET, FILM COATED ORAL at 09:10

## 2024-01-25 RX ADMIN — AMLODIPINE BESYLATE 10 MG: 5 TABLET ORAL at 09:10

## 2024-01-25 RX ADMIN — BUMETANIDE 2 MG: 0.25 INJECTION INTRAMUSCULAR; INTRAVENOUS at 20:33

## 2024-01-25 RX ADMIN — Medication 400 MG: at 20:32

## 2024-01-25 RX ADMIN — POTASSIUM CHLORIDE 10 MEQ: 750 TABLET, FILM COATED, EXTENDED RELEASE ORAL at 06:34

## 2024-01-25 RX ADMIN — SODIUM CHLORIDE, PRESERVATIVE FREE 10 ML: 5 INJECTION INTRAVENOUS at 20:33

## 2024-01-25 RX ADMIN — LEVOFLOXACIN 750 MG: 5 INJECTION, SOLUTION INTRAVENOUS at 01:21

## 2024-01-25 RX ADMIN — BUDESONIDE 500 MCG: 0.5 INHALANT RESPIRATORY (INHALATION) at 08:21

## 2024-01-25 RX ADMIN — SODIUM CHLORIDE, PRESERVATIVE FREE 10 ML: 5 INJECTION INTRAVENOUS at 09:11

## 2024-01-25 RX ADMIN — ACETAMINOPHEN 500 MG: 500 TABLET ORAL at 01:26

## 2024-01-25 RX ADMIN — BUDESONIDE 500 MCG: 0.5 INHALANT RESPIRATORY (INHALATION) at 20:47

## 2024-01-25 RX ADMIN — POTASSIUM CHLORIDE 20 MEQ: 1500 TABLET, EXTENDED RELEASE ORAL at 09:10

## 2024-01-25 RX ADMIN — ENOXAPARIN SODIUM 30 MG: 100 INJECTION SUBCUTANEOUS at 20:32

## 2024-01-25 RX ADMIN — IPRATROPIUM BROMIDE AND ALBUTEROL SULFATE 1 DOSE: 2.5; .5 SOLUTION RESPIRATORY (INHALATION) at 20:47

## 2024-01-25 RX ADMIN — POTASSIUM CHLORIDE 20 MEQ: 1500 TABLET, EXTENDED RELEASE ORAL at 20:32

## 2024-01-25 RX ADMIN — ONDANSETRON 4 MG: 4 TABLET, ORALLY DISINTEGRATING ORAL at 11:21

## 2024-01-25 RX ADMIN — IPRATROPIUM BROMIDE AND ALBUTEROL SULFATE 1 DOSE: 2.5; .5 SOLUTION RESPIRATORY (INHALATION) at 08:24

## 2024-01-25 RX ADMIN — BUMETANIDE 2 MG: 0.25 INJECTION INTRAMUSCULAR; INTRAVENOUS at 09:09

## 2024-01-25 ASSESSMENT — PAIN SCALES - GENERAL
PAINLEVEL_OUTOF10: 0
PAINLEVEL_OUTOF10: 3
PAINLEVEL_OUTOF10: 0

## 2024-01-25 ASSESSMENT — PAIN DESCRIPTION - LOCATION: LOCATION: LEG

## 2024-01-25 NOTE — PROGRESS NOTES
Hospitalist Progress Note    NAME:   Marilee Oakes   : 1950   MRN: [id number]     Date/Time: 2024 3:21 PM  Patient PCP: Parish Dumont APRN - NP    Estimated discharge date:   Barriers: diuresis, Cardiology clearance      Assessment / Plan:    CHF with ischemic and nonischemic cardiomyopathy   pulmonary edema (4L at home)  Acute on chronic systolic CHF  A.fib   Status post pacemaker  S/p Watchman implant   COVID infection  D-dimer 3.8  Continue Coreg  Wean oxygen down as tolerated  Cardio input appreciated  Continue with baby aspirin twice daily  Currently on 2 L nasal cannula  - O2 hanging off her nose, encouraged her to wear this O2  - continuing IV diuresis with Cardiology    Pan-sensitive Klebsiella UTI  Encephalopathy  Urine culture growing Pan-sensitive Klebsiella   - continuing Levoflox for a longer course of 7 days given intermittent encephalopathy  - patient oriented  on my evaluation however confused in the afternoon per nursing, may be related to UTI    Hypertensive urgency  Resume oral home medication medication  Continue with Bumex 2 mg twice daily  Increase amlodipine to 10 mg daily  Hydralazine as needed     COPD  Continue with home inhalers with pharmacy substitution     CKD stage III  Creatinine at baseline     Diabetes mellitus with neuropathy  sliding scale insulin     LEELA on CPAP  Will order CPAP at bedtime     History of recent Staph aureus bacteremia 2023  JUAN MANUEL no evidence of endocarditis    Hypernatremia  Resolved    Medical Decision Making:   I personally reviewed labs: cbc bmp, urine cultures  I personally reviewed imaging:  I personally reviewed EKG:  Toxic drug monitoring: monitor hgb for anemia from lovenox toxicity  Discussed case with: rn cm    Code Status: DNR  DVT Prophylaxis: Lovenox  GI Prophylaxis: Protonix    Subjective:     Chief Complaint / Reason for Physician Visit  Followup CHF exacerbation. Patient feels ok today, no complaints. O2 hanging  off her nose.      Objective:     VITALS:   Last 24hrs VS reviewed since prior progress note. Most recent are:  Patient Vitals for the past 24 hrs:   BP Temp Temp src Pulse Resp SpO2   01/25/24 1127 129/81 97.7 °F (36.5 °C) Oral 86 18 95 %   01/25/24 0821 -- -- -- -- 28 98 %   01/25/24 0745 (!) 163/92 97.7 °F (36.5 °C) Oral 80 19 95 %   01/25/24 0242 (!) 158/99 97.9 °F (36.6 °C) Oral 80 29 95 %   01/24/24 2321 (!) 149/95 98.4 °F (36.9 °C) Oral 80 18 94 %   01/24/24 2109 (!) 131/101 -- -- -- -- --   01/24/24 1920 (!) 131/101 97.5 °F (36.4 °C) Oral 80 20 95 %           Intake/Output Summary (Last 24 hours) at 1/25/2024 1521  Last data filed at 1/25/2024 0706  Gross per 24 hour   Intake --   Output 700 ml   Net -700 ml          I had a face to face encounter and independently examined this patient on 1/25/2024, as outlined below:  PHYSICAL EXAM:  General: Alert, cooperative  EENT:  EOMI. Anicteric sclerae.  Resp:  CTA bilaterally, no wheezing or rales.  No accessory muscle use  CV:  Regular  rate,  No edema  GI:  Soft, Non distended, Non tender.  +Bowel sounds  Neurologic:  Alert and oriented X 2, normal speech,   Psych:   Poor insight, Not anxious nor agitated  Skin:  No rashes.  No jaundice    Reviewed most current lab test results and cultures  YES  Reviewed most current radiology test results   YES  Review and summation of old records today    NO  Reviewed patient's current orders and MAR    YES  PMH/SH reviewed - no change compared to H&P    Procedures: see electronic medical records for all procedures/Xrays and details which were not copied into this note but were reviewed prior to creation of Plan.      LABS:  I reviewed today's most current labs and imaging studies.  Pertinent labs include:  Recent Labs     01/23/24  0121 01/24/24  1557 01/25/24  0121   WBC 4.0 3.4* 3.6   HGB 9.9* 11.2* 10.4*   HCT 36.0 39.6 37.3    224 254       Recent Labs     01/22/24  1732 01/23/24  0121 01/24/24  1557 01/25/24  0121

## 2024-01-25 NOTE — CARE COORDINATION
Transition of Care Plan:     RUR: 25%   Prior Level of Functioning: assistance with ADLs/IADLs  Disposition: home with [facility]  If SNF or IPR: Date FOC offered:   Date FOC received:   Accepting facility:   Date authorization started with reference number:   Date authorization received and expires:   Follow up appointments: PCP,Specialists  DME needed: Pt has home o2, CPAP, rollator, and  w/c  Transportation at discharge: spouse  IM/IMM Medicare/ letter given: needed at d/c  Is patient a [place] and connected with [facility]?               If yes, was [place] transfer form completed and [facility] notified?   Caregiver Contact:   Wesley Oakes (Spouse)  [phone] (Mobile)   Discharge Caregiver contacted prior to discharge?   Care Conference needed?   Barriers to discharge:     CM reviewed therapy's recommendations of SNF at d/c.  CM called pt's spouse to discuss.  Pt's spouse adamant that pt will go home with [facility] at d/c.           Camille Arevalo LMSW  Supervisee in Social Work  Care Management, [facility]  [id number]

## 2024-01-25 NOTE — PLAN OF CARE
Problem: Discharge Planning  Goal: Discharge to home or other facility with appropriate resources  Outcome: Progressing     Problem: Pain  Goal: Verbalizes/displays adequate comfort level or baseline comfort level  Outcome: Progressing     Problem: Safety - Adult  Goal: Free from fall injury  Outcome: Progressing     Problem: Chronic Conditions and Co-morbidities  Goal: Patient's chronic conditions and co-morbidity symptoms are monitored and maintained or improved  Outcome: Progressing     Problem: Respiratory - Adult  Goal: Achieves optimal ventilation and oxygenation  1/24/2024 2338 by Lars Springer RN  Outcome: Progressing  1/24/2024 2325 by Edison [place] RCP  Outcome: Progressing  1/24/2024 1728 by Zabrina Garay, RT  Outcome: Progressing     Problem: Occupational Therapy - Adult  Goal: By Discharge: Performs self-care activities at highest level of function for planned discharge setting.  See evaluation for individualized goals.  Description: FUNCTIONAL STATUS PRIOR TO ADMISSION:  Pt was recently admitted, December 2023, with recommendation for SNF, however spouse ended up taking pt home at a x1 assist level. Unclear if spouse was still assisting pt PTA.      HOME SUPPORT: Patient lived with spouse. Unclear if pt still required assistance from spouse PTA/.     Occupational Therapy Goals:  Initiated 1/24/2024  1.  Patient will perform grooming in stance with Contact Guard Assist within 7 day(s).  2.  Patient will perform lower body dressing with Minimal Assist within 7 day(s).  3.  Patient will perform upper body dressing with Set-up within 7 day(s).  4.  Patient will perform toilet transfers with Contact Guard Assist  within 7 day(s).  5.  Patient will perform all aspects of toileting with Minimal Assist within 7 day(s).  6.  Patient will complete supine<>sit with Minimal Assist  within 7 day(s).        1/24/2024 1513 by Savannah Gambino, OT  Outcome: Progressing     Problem: Physical Therapy -

## 2024-01-25 NOTE — PROGRESS NOTES
Vitals for the past 8 hrs:   Pulse   01/25/24 0745 80   01/25/24 0242 80      Patient Vitals for the past 8 hrs:   Resp   01/25/24 0821 28   01/25/24 0745 19   01/25/24 0242 29      Patient Vitals for the past 8 hrs:   BP   01/25/24 0745 (!) 163/92   01/25/24 0242 (!) 158/99          Intake/Output Summary (Last 24 hours) at 1/25/2024 1012  Last data filed at 1/25/2024 0706  Gross per 24 hour   Intake --   Output 700 ml   Net -700 ml         Nondiaphoretic, not in acute distress.  L chest BIV-ICD site OK.  No scleral icterus, mucous membranes moist, conjuctivae pink, no xanthelasma.  Unlabored, clear to auscultation bilaterally anteriorly, symmetric air movement.  Regular rate and rhythm, no murmur, pericardial rub, knock, or gallop.  Stable peripheral edema.  Palpable radial pulses bilaterally.  Abdomen, soft, nontender, nondistended.  Extremities without cyanosis or clubbing.  Muscle tone and bulk normal for age.  Skin warm and dry.  R foot ulcer.  Neuro grossly nonfocal.  No tremor.  Awake and appropriate.    CARDIOGRAPHICS and STUDIES, I reviewed:    Telemetry:  Atrial fibrillation with demand pacing.    ECG:  Atrial fibrillation with BIV pacing.    CXR:  Bilateral pulmonary edema suggested.       Labs:  No results for input(s): \"CPK\", \"CKMB\" in the last 72 hours.    Invalid input(s): \"CPKMB\", \"CKNDX\", \"TROIQ\"  Lab Results   Component Value Date/Time    CHOL 99 10/27/2023 09:24 AM    HDL 33 10/27/2023 09:24 AM     No results for input(s): \"INR\", \"APTT\" in the last 72 hours.    Invalid input(s): \"PTP\"   Recent Labs     01/23/24  0121 01/24/24  1557 01/25/24  0121    142 143   K 3.4* 3.1* 3.1*   * 103 104   CO2 31 36* 36*   BUN 15 13 11   WBC 4.0 3.4* 3.6   HGB 9.9* 11.2* 10.4*   HCT 36.0 39.6 37.3    224 254       Recent Labs     01/22/24  1732   GLOB 3.5       No components found for: \"GLPOC\"  No results for input(s): \"PH\", \"PCO2\", \"PO2\" in the last 72 hours.      Edgard Santos MD 01/25/24

## 2024-01-25 NOTE — PROGRESS NOTES
1900 Bedside and Verbal shift change report given to Lars ALARCON (oncoming nurse) by Eileen RN (offgoing nurse). Report included the following information Nurse Handoff Report, MAR, Recent Results, and Cardiac Rhythm V.Paced .     0547 Latest potassium is 3.1. Informed Amparo CASTILLO (also info about pt taking bumex 2mg  and Klor con 20 meq twice a day.    End of Shift Note    Bedside shift change report given to Janny ALARCON (oncoming nurse) by Lars Springer RN (offgoing nurse).  Report included the following information SBAR, MAR, Recent Results, and Cardiac Rhythm V.Paced    Shift worked:  7pm-7am     Shift summary and any significant changes:    Morning bloods collected and sent to lab       Concerns for physician to address:        Zone phone for oncoming shift:           Activity:     Number times ambulated in hallways past shift: 0  Number of times OOB to chair past shift: 0    Cardiac:   Cardiac Monitoring: Yes           Access:  Current line(s): PIV     Genitourinary:   Urinary status: voiding    Respiratory:      Chronic home O2 use?: YES  Incentive spirometer at bedside: YES       GI:     Current diet:  ADULT DIET; Regular  Passing flatus: YES  Tolerating current diet: YES       Pain Management:   Patient states pain is manageable on current regimen: YES    Skin:     Interventions: specialty bed, float heels, increase time out of bed, foam dressing, PT/OT consult, limit briefs, internal/external urinary devices, and nutritional support    Patient Safety:  Fall Score:    Interventions: bed/chair alarm, assistive device (walker, cane. etc), gripper socks, pt to call before getting OOB, stay with me (per policy), and gait belt       Length of Stay:  Expected LOS: 3  Actual LOS: 2      Lars Springer RN

## 2024-01-26 LAB
ANION GAP SERPL CALC-SCNC: 5 MMOL/L (ref 5–15)
BUN SERPL-MCNC: 14 MG/DL (ref 6–20)
BUN/CREAT SERPL: 8 (ref 12–20)
CALCIUM SERPL-MCNC: 10 MG/DL (ref 8.5–10.1)
CHLORIDE SERPL-SCNC: 107 MMOL/L (ref 97–108)
CO2 SERPL-SCNC: 34 MMOL/L (ref 21–32)
CREAT SERPL-MCNC: 1.75 MG/DL (ref 0.55–1.02)
ERYTHROCYTE [DISTWIDTH] IN BLOOD BY AUTOMATED COUNT: 17 % (ref 11.5–14.5)
GLUCOSE BLD STRIP.AUTO-MCNC: 109 MG/DL (ref 65–117)
GLUCOSE BLD STRIP.AUTO-MCNC: 132 MG/DL (ref 65–117)
GLUCOSE BLD STRIP.AUTO-MCNC: 148 MG/DL (ref 65–117)
GLUCOSE BLD STRIP.AUTO-MCNC: 150 MG/DL (ref 65–117)
GLUCOSE SERPL-MCNC: 136 MG/DL (ref 65–100)
HCT VFR BLD AUTO: 39.2 % (ref 35–47)
HGB BLD-MCNC: 11 G/DL (ref 11.5–16)
MCH RBC QN AUTO: 23.1 PG (ref 26–34)
MCHC RBC AUTO-ENTMCNC: 28.1 G/DL (ref 30–36.5)
MCV RBC AUTO: 82.4 FL (ref 80–99)
NRBC # BLD: 0 K/UL (ref 0–0.01)
NRBC BLD-RTO: 0 PER 100 WBC
PLATELET # BLD AUTO: 310 K/UL (ref 150–400)
PMV BLD AUTO: 11.5 FL (ref 8.9–12.9)
POTASSIUM SERPL-SCNC: 3.8 MMOL/L (ref 3.5–5.1)
RBC # BLD AUTO: 4.76 M/UL (ref 3.8–5.2)
SERVICE CMNT-IMP: ABNORMAL
SERVICE CMNT-IMP: NORMAL
SODIUM SERPL-SCNC: 146 MMOL/L (ref 136–145)
WBC # BLD AUTO: 4.3 K/UL (ref 3.6–11)

## 2024-01-26 PROCEDURE — 97535 SELF CARE MNGMENT TRAINING: CPT

## 2024-01-26 PROCEDURE — A4216 STERILE WATER/SALINE, 10 ML: HCPCS | Performed by: INTERNAL MEDICINE

## 2024-01-26 PROCEDURE — 2700000000 HC OXYGEN THERAPY PER DAY

## 2024-01-26 PROCEDURE — 94640 AIRWAY INHALATION TREATMENT: CPT

## 2024-01-26 PROCEDURE — 2060000000 HC ICU INTERMEDIATE R&B

## 2024-01-26 PROCEDURE — 6370000000 HC RX 637 (ALT 250 FOR IP): Performed by: STUDENT IN AN ORGANIZED HEALTH CARE EDUCATION/TRAINING PROGRAM

## 2024-01-26 PROCEDURE — 94760 N-INVAS EAR/PLS OXIMETRY 1: CPT

## 2024-01-26 PROCEDURE — 6370000000 HC RX 637 (ALT 250 FOR IP): Performed by: INTERNAL MEDICINE

## 2024-01-26 PROCEDURE — 80048 BASIC METABOLIC PNL TOTAL CA: CPT

## 2024-01-26 PROCEDURE — 6360000002 HC RX W HCPCS: Performed by: INTERNAL MEDICINE

## 2024-01-26 PROCEDURE — C9113 INJ PANTOPRAZOLE SODIUM, VIA: HCPCS | Performed by: INTERNAL MEDICINE

## 2024-01-26 PROCEDURE — 85027 COMPLETE CBC AUTOMATED: CPT

## 2024-01-26 PROCEDURE — 82962 GLUCOSE BLOOD TEST: CPT

## 2024-01-26 PROCEDURE — 97110 THERAPEUTIC EXERCISES: CPT

## 2024-01-26 PROCEDURE — 36415 COLL VENOUS BLD VENIPUNCTURE: CPT

## 2024-01-26 PROCEDURE — 2580000003 HC RX 258: Performed by: INTERNAL MEDICINE

## 2024-01-26 PROCEDURE — 97116 GAIT TRAINING THERAPY: CPT

## 2024-01-26 RX ORDER — BUMETANIDE 1 MG/1
2 TABLET ORAL DAILY
Status: DISCONTINUED | OUTPATIENT
Start: 2024-01-26 | End: 2024-01-27 | Stop reason: HOSPADM

## 2024-01-26 RX ADMIN — BUMETANIDE 2 MG: 1 TABLET ORAL at 18:44

## 2024-01-26 RX ADMIN — ISOSORBIDE MONONITRATE 60 MG: 30 TABLET, EXTENDED RELEASE ORAL at 09:37

## 2024-01-26 RX ADMIN — CARVEDILOL 25 MG: 12.5 TABLET, FILM COATED ORAL at 18:44

## 2024-01-26 RX ADMIN — ASPIRIN 81 MG: 81 TABLET, CHEWABLE ORAL at 09:37

## 2024-01-26 RX ADMIN — IPRATROPIUM BROMIDE AND ALBUTEROL SULFATE 1 DOSE: 2.5; .5 SOLUTION RESPIRATORY (INHALATION) at 09:05

## 2024-01-26 RX ADMIN — ASPIRIN 81 MG: 81 TABLET, CHEWABLE ORAL at 22:34

## 2024-01-26 RX ADMIN — SODIUM CHLORIDE, PRESERVATIVE FREE 10 ML: 5 INJECTION INTRAVENOUS at 09:54

## 2024-01-26 RX ADMIN — Medication 400 MG: at 09:37

## 2024-01-26 RX ADMIN — IPRATROPIUM BROMIDE AND ALBUTEROL SULFATE 1 DOSE: 2.5; .5 SOLUTION RESPIRATORY (INHALATION) at 20:37

## 2024-01-26 RX ADMIN — SODIUM CHLORIDE, PRESERVATIVE FREE 40 MG: 5 INJECTION INTRAVENOUS at 09:37

## 2024-01-26 RX ADMIN — AMLODIPINE BESYLATE 10 MG: 5 TABLET ORAL at 09:37

## 2024-01-26 RX ADMIN — POTASSIUM CHLORIDE 20 MEQ: 1500 TABLET, EXTENDED RELEASE ORAL at 22:35

## 2024-01-26 RX ADMIN — ENOXAPARIN SODIUM 30 MG: 100 INJECTION SUBCUTANEOUS at 09:36

## 2024-01-26 RX ADMIN — POTASSIUM CHLORIDE 20 MEQ: 1500 TABLET, EXTENDED RELEASE ORAL at 09:37

## 2024-01-26 RX ADMIN — Medication 400 MG: at 22:36

## 2024-01-26 RX ADMIN — CARVEDILOL 25 MG: 12.5 TABLET, FILM COATED ORAL at 09:37

## 2024-01-26 RX ADMIN — BUDESONIDE 500 MCG: 0.5 INHALANT RESPIRATORY (INHALATION) at 08:59

## 2024-01-26 RX ADMIN — ENOXAPARIN SODIUM 30 MG: 100 INJECTION SUBCUTANEOUS at 22:36

## 2024-01-26 RX ADMIN — SODIUM CHLORIDE, PRESERVATIVE FREE 10 ML: 5 INJECTION INTRAVENOUS at 22:37

## 2024-01-26 RX ADMIN — BUDESONIDE 500 MCG: 0.5 INHALANT RESPIRATORY (INHALATION) at 20:37

## 2024-01-26 ASSESSMENT — PAIN SCALES - GENERAL: PAINLEVEL_OUTOF10: 0

## 2024-01-26 NOTE — PLAN OF CARE
Problem: Physical Therapy - Adult  Goal: By Discharge: Performs mobility at highest level of function for planned discharge setting.  See evaluation for individualized goals.  Description: FUNCTIONAL STATUS PRIOR TO ADMISSION: Pt reports Mod I with Rollator household distances. Denies falls in last year    HOME SUPPORT PRIOR TO ADMISSION: Lives with  in 1 story home, KRISTEN with 1 rail. Step-over tub with shower chair    Physical Therapy Goals  Initiated 1/24/2024  1.  Patient will move from supine to sit and sit to supine, scoot up and down, and roll side to side in bed with supervision/set-up within 7 day(s).    2.  Patient will perform sit to stand with supervision/set-up within 7 day(s).  3.  Patient will transfer from bed to chair and chair to bed with contact guard assist using the least restrictive device within 7 day(s).  4.  Patient will ambulate with contact guard assist for 50 feet with the least restrictive device within 7 day(s).   5.  Patient will ascend/descend 4 stairs with 1 handrail(s) with contact guard assist within 7 day(s).   Outcome: Progressing   PHYSICAL THERAPY TREATMENT    Patient: Marilee Oakes (73 y.o. female)  Date: 1/26/2024  Diagnosis: Acute pulmonary edema (HCC) [J81.0]  Heart failure (HCC) [I50.9]  Acute cystitis without hematuria [N30.00]  Other fatigue [R53.83]  Acute on chronic congestive heart failure, unspecified heart failure type (HCC) [I50.9]  COVID-19 [U07.1] Heart failure (HCC)      Precautions:  (falls, droplet plus/airborne (COVID), UTI)                      ASSESSMENT:  Pt tolerated PT services well and continues to progress toward PT POC goals. Pt was received in bed. Pt's spouse present t/o with Pt consent. Upon arrival, Pt's spouse reported concern/upset re misinformation provided by unknown source that therapy teams had recommended discharge to a particular site. Pt's spouse recounted a prior negative experience at the location with adamant goal to not  Walking in hospital room? []  1 []  2 [x]  3  []  4   6.  Climbing 3-5 steps with a railing? []  1 []  2 [x]  3  []  4     Raw Score: 18/24                            Cutoff score ?171,2,3 had higher odds of discharging home with home health or need of SNF/IPR.    1. Mariela Mendez, Марина Bonner, Angelo Capps, Radha Zendejas, Allen Franco, Audie Mendez.  Validity of the AM-PAC “6-Clicks” Inpatient Daily Activity and Basic Mobility Short Forms. Physical Therapy Mar 2014, 94 3) [phone]; DOI: 10.2522/ptj.[id number]  2. Tomas SUAREZ, Araceli RAMOS, Chris RAMOS, Irwin RAMOS. Association of AM-PAC \"6-Clicks\" Basic Mobility and Daily Activity Scores With Discharge Destination. Phys Ther. 2021 Apr 4;101(4):moja178. doi: 10.1093/ptj/nrfz028. PMID: 89229751.  3. Yann RAMOS, Elijah D, Violette S, Sarah K, Jazlyn S. Activity Measure for Post-Acute Care \"6-Clicks\" Basic Mobility Scores Predict Discharge Destination After Acute Care Hospitalization in Select Patient Groups: A Retrospective, Observational Study. Arch Rehabil Res Clin Transl. 2022 Jul 16;4(3):183801. doi: 10.1016/j.arrct.2022.423938. PMID: 47328197; PMCID: [id number].  4. Andrea BAINS, Shantelle S, Morales W, Cecily P. AM-PAC Short Forms Manual 4.0. Revised 2/2020.                                                                                                                                                                                                                              Pain Rating:  No report or s/s pain.    Pain Intervention(s):   Not applicable.    Activity Tolerance:   Good    After treatment:   Patient left in no apparent distress sitting up in chair, Call bell within reach, Bed/ chair alarm activated, and Caregiver / family present      COMMUNICATION/EDUCATION:   The patient's plan of care was discussed with: occupational therapist, registered nurse, and spouse.    Patient Education  Education Given To: Patient;Family (spouse)  Education Provided: Role

## 2024-01-26 NOTE — PROGRESS NOTES
EP/ ARRHYTHMIA Progress Note    Patient ID:  Patient: Marilee Oakes  MRN: [id number]  Age: 73 y.o.  : 1950    Date of  Admission: 2024  4:28 PM   PCP:  Parish Dumont APRN - NP  Usual cardiologist:  Bruce Abbott MD    Assessment:   Acute on chronic systolic CHF.  Echo 2023 EF 50-55%, moderate aortic valve stenosis.  EF improved with BIV pacing and medication.    Net IO Since Admission: -4,780 mL [24 1713]    No data found.      COVID infection.  Recent MRSA bacteremia 2023.  JUAN MANUEL without evidence of bacterial endocarditis.  Permanent atrial fibrillation.  Remote [employer] BIV-ICD upgrade and AV node ablation in 10/2018, she is pacemaker-dependent.  She has a retained disconnected/capped RV pacing lead--so MRI incompatible.  Chronic mixed ischemic/nonischemic cardiomyopathy EF 35-40% in 2020, old anterior infarct.  This improved with BIV pacing and medication.  Hypertensive heart disease with heart failure and CKD stage 3.  Lower extremity venous insufficiency with venous ablation in .  History of LLE ulcer.  Has right foot ulcer.  DM type 2 without chronic insulin.  Diabetic neuropathy.  Watchman implant in , transitioned from AC to DAPT and then ASA monotherapy.  Klebsiella pneumoniae UTI.  DNR.     Plan:     Agree with change from IV to less aggressive home oral diuretic, volume contracted by labs.  Noted is that she has shock therapies enabled in the setting of DNR.  I don't think any BIV pacing reprogramming is needed at this time.  Continue carvedilol.  Increased amlodipine to 10 mg daily.  Increased baby ASA to TWICE DAILY here, can go to once daily on discharge.  Klebsiella pneumoniae UTI noted, treated with levofloxacin.    Stable from a cardiac standpoint.      [x]       High complexity decision making was performed in this patient at high risk for decompensation with multiple organ involvement.    Marilee Oakes is a 73 y.o. female with  °C)    No data found.   No data found.   No data found.     No intake or output data in the 24 hours ending 01/26/24 1713      Nondiaphoretic, not in acute distress.  L chest BIV-ICD site OK.  No scleral icterus, mucous membranes moist, conjuctivae pink, no xanthelasma.  Unlabored, clear to auscultation bilaterally anteriorly, symmetric air movement.  Regular rate and rhythm, no murmur, pericardial rub, knock, or gallop.  Stable peripheral edema.  Palpable radial pulses bilaterally.  Abdomen, soft, nontender, nondistended.  Extremities without cyanosis or clubbing.  Muscle tone and bulk normal for age.  Skin warm and dry.  R foot ulcer.  Neuro grossly nonfocal.  No tremor.  Awake and appropriate.    CARDIOGRAPHICS and STUDIES, I reviewed:    Telemetry:  Atrial fibrillation with demand pacing.    ECG:  Atrial fibrillation with BIV pacing.    CXR:  Bilateral pulmonary edema suggested.       Labs:  No results for input(s): \"CPK\", \"CKMB\" in the last 72 hours.    Invalid input(s): \"CPKMB\", \"CKNDX\", \"TROIQ\"  Lab Results   Component Value Date/Time    CHOL 99 10/27/2023 09:24 AM    HDL 33 10/27/2023 09:24 AM     No results for input(s): \"INR\", \"APTT\" in the last 72 hours.    Invalid input(s): \"PTP\"   Recent Labs     01/24/24  1557 01/25/24  0121 01/26/24  0032    143 146*   K 3.1* 3.1* 3.8    104 107   CO2 36* 36* 34*   BUN 13 11 14   WBC 3.4* 3.6 4.3   HGB 11.2* 10.4* 11.0*   HCT 39.6 37.3 39.2    254 310       No results for input(s): \"TP\", \"ALB\", \"GLOB\", \"GGT\", \"AML\" in the last 72 hours.    Invalid input(s): \"SGOT\", \"GPT\", \"AP\", \"TBIL\", \"AMYP\", \"LPSE\", \"HLPSE\"    No components found for: \"GLPOC\"  No results for input(s): \"PH\", \"PCO2\", \"PO2\" in the last 72 hours.      Edgard Santos MD 01/26/24 5:13 PM

## 2024-01-26 NOTE — PLAN OF CARE
Problem: Occupational Therapy - Adult  Goal: By Discharge: Performs self-care activities at highest level of function for planned discharge setting.  See evaluation for individualized goals.  Description: FUNCTIONAL STATUS PRIOR TO ADMISSION:  Pt was recently admitted, December 2023, with recommendation for SNF, however spouse ended up taking pt home at a x1 assist level. Unclear if spouse was still assisting pt PTA.      HOME SUPPORT: Patient lived with spouse. Unclear if pt still required assistance from spouse PTA/.     Occupational Therapy Goals:  Initiated 1/24/2024  1.  Patient will perform grooming in stance with Contact Guard Assist within 7 day(s).  2.  Patient will perform lower body dressing with Minimal Assist within 7 day(s).  3.  Patient will perform upper body dressing with Set-up within 7 day(s).  4.  Patient will perform toilet transfers with Contact Guard Assist  within 7 day(s).  5.  Patient will perform all aspects of toileting with Minimal Assist within 7 day(s).  6.  Patient will complete supine<>sit with Minimal Assist  within 7 day(s).        Outcome: Progressing   OCCUPATIONAL THERAPY TREATMENT  Patient: Marilee Oakes (73 y.o. female)  Date: 1/26/2024  Primary Diagnosis: Acute pulmonary edema (HCC) [J81.0]  Heart failure (HCC) [I50.9]  Acute cystitis without hematuria [N30.00]  Other fatigue [R53.83]  Acute on chronic congestive heart failure, unspecified heart failure type (HCC) [I50.9]  COVID-19 [U07.1]       Precautions: Fall Risk, Isolation (droplet)                Chart, occupational therapy assessment, plan of care, and goals were reviewed.    ASSESSMENT  Patient continues to benefit from skilled OT services and is slowly progressing towards goals. Pt demonstrated improvement with activity tolerance, able to mobilize from bed<>bathroom<>recliner and engage in LB dressing. Pt continues to present with impaired cognition, with decreased insight, attention, concentration, requiring

## 2024-01-26 NOTE — PLAN OF CARE
Problem: Discharge Planning  Goal: Discharge to home or other facility with appropriate resources  Outcome: Progressing     Problem: Pain  Goal: Verbalizes/displays adequate comfort level or baseline comfort level  Outcome: Progressing     Problem: Safety - Adult  Goal: Free from fall injury  Outcome: Progressing     Problem: Chronic Conditions and Co-morbidities  Goal: Patient's chronic conditions and co-morbidity symptoms are monitored and maintained or improved  Outcome: Progressing     Problem: Respiratory - Adult  Goal: Achieves optimal ventilation and oxygenation  1/25/2024 2143 by Lars Springer RN  Outcome: Progressing  1/25/2024 0824 by Rosaura Moreira, RT  Outcome: Progressing     Problem: Skin/Tissue Integrity  Goal: Absence of new skin breakdown  Description: 1.  Monitor for areas of redness and/or skin breakdown  2.  Assess vascular access sites hourly  3.  Every 4-6 hours minimum:  Change oxygen saturation probe site  4.  Every 4-6 hours:  If on nasal continuous positive airway pressure, respiratory therapy assess nares and determine need for appliance change or resting period.  Outcome: Progressing

## 2024-01-26 NOTE — PROGRESS NOTES
1500: pt had some intermittent confusion and hallucinating. [place] pt screaming and stated there was a machine on her bed moving. This occurred a couple of times back to back where pt also thought the bed was moving. RN reassured pt she was ok and in the hospital. Pt then called  on phone to say her feet were burning (pt did the same thing last night).  voiced concerns about her having done this before with the UTI. Notified MD, orders received to restart PO abx tomorrow.   1910: Bedside and Verbal shift change report given to AGNES Sousa (oncoming nurse) by Janny ALARCON (offgoing nurse).  Report given with SBAR, Kardex, Intake/Output, MAR and Recent Results.

## 2024-01-26 NOTE — PROGRESS NOTES
1900 Bedside and Verbal shift change report given to Lars ALARCON (oncoming nurse) by Janny ALARCON (offgoing nurse). Report included the following information Nurse Handoff Report, MAR, Recent Results, and Cardiac Rhythm V.Paced .     0030 Pt tries to get out of bed for the 3rd time since the start of the shift. Pt states that \"a cat is on her foot and fire is on her back\" that is why she is getting up and keeps calling me \"Janes\" (her brother as per pt).    End of Shift Note    Bedside shift change report given to Leila ALARCON (oncoming nurse) by Lars Springer RN (offgoing nurse).  Report included the following information SBAR, MAR, Recent Results, and Cardiac Rhythm V.Paced    Shift worked:  7pm-7am     Shift summary and any significant changes:     Morning bloods collected and sent to lab.    Bed alarm on. Kept monitored.    See above   Concerns for physician to address:        Zone phone for oncoming shift:           Activity:     Number times ambulated in hallways past shift: 0  Number of times OOB to chair past shift: 0    Cardiac:   Cardiac Monitoring: Yes           Access:  Current line(s): PIV     Genitourinary:   Urinary status: voiding and external catheter    Respiratory:      Chronic home O2 use?: YES  Incentive spirometer at bedside: YES       GI:     Current diet:  ADULT DIET; Regular  Passing flatus: YES  Tolerating current diet: YES       Pain Management:   Patient states pain is manageable on current regimen: YES    Skin:     Interventions: specialty bed, float heels, increase time out of bed, foam dressing, PT/OT consult, limit briefs, internal/external urinary devices, and nutritional support    Patient Safety:  Fall Score:    Interventions: bed/chair alarm, assistive device (walker, cane. etc), gripper socks, pt to call before getting OOB, stay with me (per policy), and gait belt       Length of Stay:  Expected LOS: 4  Actual LOS: 3      Lars Springer, RN

## 2024-01-26 NOTE — PROGRESS NOTES
Hospitalist Progress Note    NAME:   Marilee Oakes   : 1950   MRN: [id number]     Date/Time: 2024 4:53 PM  Patient PCP: Parish Dumont APRN - NP    Estimated discharge date:   Barriers: diuresis, Cardiology clearance      Assessment / Plan:    CHF with ischemic and nonischemic cardiomyopathy   pulmonary edema (4L at home)  Acute on chronic systolic CHF  A.fib   Status post pacemaker  S/p Watchman implant   COVID infection  D-dimer 3.8  Continue Coreg  Wean oxygen down as tolerated  Cardio input appreciated  Continue with baby aspirin twice daily  Currently on 2 L nasal cannula  - O2 hanging off her nose, encouraged her to wear this O2  - transitioned from IV bumex 2mg bid back to home bumex 2mg po daily    Pan-sensitive Klebsiella UTI  Encephalopathy  Urine culture growing Pan-sensitive Klebsiella   - continuing Levoflox for a longer course of 7 days given intermittent encephalopathy  - patient oriented  on my evaluation however confused in the afternoon per nursing, may be related to UTI    Hypertensive urgency  Resume oral home medication medication  Continue with Bumex 2 mg twice daily  Increase amlodipine to 10 mg daily  Hydralazine as needed     COPD  Continue with home inhalers with pharmacy substitution     CKD stage III  Creatinine at baseline     Diabetes mellitus with neuropathy  sliding scale insulin     LEELA on CPAP  Will order CPAP at bedtime     History of recent Staph aureus bacteremia 2023  JUAN MANUEL no evidence of endocarditis    Hypernatremia  Resolved    Medical Decision Making:   I personally reviewed labs: cbc bmp  I personally reviewed imaging:  I personally reviewed EKG:  Toxic drug monitoring: monitor hgb for anemia from lovenox toxicity  Discussed case with: rn cm    Code Status: DNR  DVT Prophylaxis: Lovenox  GI Prophylaxis: Protonix    Subjective:     Chief Complaint / Reason for Physician Visit  Followup CHF exacerbation. Feels ok today, no

## 2024-01-26 NOTE — PROGRESS NOTES
Bedside and Verbal shift change report given to Leila ALARCON (oncoming nurse) by Lars ALARCON (offgoing nurse). Report included the following information Nurse Handoff Report, Adult Overview, Recent Results, and Cardiac Rhythm V paced .     End of Shift Note    Bedside shift change report given to *** (oncoming nurse) by Leila Mead RN (offgoing nurse).  Report included the following information SBAR, MAR, and Recent Results    Shift worked:  7-7     Shift summary and any significant changes:          Concerns for physician to address:       Zone phone for oncoming shift:          Activity:     Number times ambulated in hallways past shift: 0  Number of times OOB to chair past shift: 1    Cardiac:   Cardiac Monitoring: Yes           Access:  Current line(s): PIV     Genitourinary:   Urinary status: voiding and external catheter    Respiratory:      Chronic home O2 use?: NO  Incentive spirometer at bedside: NO       GI:     Current diet:  ADULT DIET; Regular  Passing flatus: YES  Tolerating current diet: YES       Pain Management:   Patient states pain is manageable on current regimen: YES    Skin:     Interventions: turn team and increase time out of bed    Patient Safety:  Fall Score:    Interventions: bed/chair alarm, assistive device (walker, cane. etc), and gripper socks       Length of Stay:  Expected LOS: 4  Actual LOS: 4      Leila Mead RN

## 2024-01-26 NOTE — CARE COORDINATION
Transition of Care Plan:     RUR: 23%   Prior Level of Functioning: assistance with ADLs/IADLs  Disposition: home with [facility] of [facility] (pt's spouse does not want SNF)  If SNF or IPR: Date FOC offered:   Date FOC received:   Accepting facility:   Date authorization started with reference number:   Date authorization received and expires:   Follow up appointments: PCP,Specialists  DME needed: Pt has home o2, CPAP, rollator, and  w/c  Transportation at discharge: spouse  IM/IMM Medicare/ letter given: needed at d/c  Is patient a  and connected with VA?               If yes, was  transfer form completed and VA notified?   Caregiver Contact:   VioletteWesley (Spouse)  [phone] (Mobile)   Discharge Caregiver contacted prior to discharge?   Care Conference needed?   Barriers to discharge:     CM called pt's room but did not get an answer.  CM called pt's spouse to discuss d/c.  CM reviewed 2IM notice with him and will email it to him at [email or web address].  Pt's spouse asked that the d/c happen tomorrow morning instead of today as he does not drive at night and has left the hospital.  MELVIN explained [facility] is all set up for pt.      Camille Arevalo, LOTTIE  Supervisee in Social Work  Care Management, [facility]  [phone]

## 2024-01-26 NOTE — PLAN OF CARE
Problem: Discharge Planning  Goal: Discharge to home or other facility with appropriate resources  1/26/2024 1031 by Leila Mead RN  Outcome: Progressing  1/25/2024 2143 by Lars Springer RN  Outcome: Progressing     Problem: Pain  Goal: Verbalizes/displays adequate comfort level or baseline comfort level  1/26/2024 1031 by Leila Mead RN  Outcome: Progressing  1/25/2024 2143 by Lars Springer RN  Outcome: Progressing     Problem: Safety - Adult  Goal: Free from fall injury  1/26/2024 1031 by Leila Mead RN  Outcome: Progressing  1/25/2024 2143 by Lars Springer RN  Outcome: Progressing     Problem: Chronic Conditions and Co-morbidities  Goal: Patient's chronic conditions and co-morbidity symptoms are monitored and maintained or improved  1/26/2024 1031 by Leila Mead RN  Outcome: Progressing  1/25/2024 2143 by Lars Springer RN  Outcome: Progressing     Problem: Respiratory - Adult  Goal: Achieves optimal ventilation and oxygenation  1/26/2024 1031 by Leila Mead RN  Outcome: Progressing  1/26/2024 0906 by Rosaura Moreira, RT  Outcome: Progressing  1/25/2024 2308 by Edison Lake RCP  Outcome: Progressing  1/25/2024 2143 by Lars Springer RN  Outcome: Progressing     Problem: Skin/Tissue Integrity  Goal: Absence of new skin breakdown  Description: 1.  Monitor for areas of redness and/or skin breakdown  2.  Assess vascular access sites hourly  3.  Every 4-6 hours minimum:  Change oxygen saturation probe site  4.  Every 4-6 hours:  If on nasal continuous positive airway pressure, respiratory therapy assess nares and determine need for appliance change or resting period.  1/26/2024 1031 by Leila Mead RN  Outcome: Progressing  1/25/2024 2143 by Lars Springer RN  Outcome: Progressing

## 2024-01-27 VITALS
BODY MASS INDEX: 36.39 KG/M2 | TEMPERATURE: 98.7 F | WEIGHT: 232.37 LBS | RESPIRATION RATE: 18 BRPM | HEART RATE: 85 BPM | DIASTOLIC BLOOD PRESSURE: 84 MMHG | SYSTOLIC BLOOD PRESSURE: 163 MMHG | OXYGEN SATURATION: 98 %

## 2024-01-27 LAB
ANION GAP SERPL CALC-SCNC: 4 MMOL/L (ref 5–15)
BUN SERPL-MCNC: 25 MG/DL (ref 6–20)
BUN/CREAT SERPL: 13 (ref 12–20)
CALCIUM SERPL-MCNC: 10.2 MG/DL (ref 8.5–10.1)
CHLORIDE SERPL-SCNC: 109 MMOL/L (ref 97–108)
CO2 SERPL-SCNC: 36 MMOL/L (ref 21–32)
CREAT SERPL-MCNC: 1.9 MG/DL (ref 0.55–1.02)
GLUCOSE BLD STRIP.AUTO-MCNC: 132 MG/DL (ref 65–117)
GLUCOSE SERPL-MCNC: 162 MG/DL (ref 65–100)
POTASSIUM SERPL-SCNC: 3.9 MMOL/L (ref 3.5–5.1)
SERVICE CMNT-IMP: ABNORMAL
SODIUM SERPL-SCNC: 149 MMOL/L (ref 136–145)

## 2024-01-27 PROCEDURE — C9113 INJ PANTOPRAZOLE SODIUM, VIA: HCPCS | Performed by: INTERNAL MEDICINE

## 2024-01-27 PROCEDURE — 6370000000 HC RX 637 (ALT 250 FOR IP): Performed by: STUDENT IN AN ORGANIZED HEALTH CARE EDUCATION/TRAINING PROGRAM

## 2024-01-27 PROCEDURE — 94640 AIRWAY INHALATION TREATMENT: CPT

## 2024-01-27 PROCEDURE — 80048 BASIC METABOLIC PNL TOTAL CA: CPT

## 2024-01-27 PROCEDURE — 6370000000 HC RX 637 (ALT 250 FOR IP): Performed by: INTERNAL MEDICINE

## 2024-01-27 PROCEDURE — 6360000002 HC RX W HCPCS: Performed by: INTERNAL MEDICINE

## 2024-01-27 PROCEDURE — 94760 N-INVAS EAR/PLS OXIMETRY 1: CPT

## 2024-01-27 PROCEDURE — A4216 STERILE WATER/SALINE, 10 ML: HCPCS | Performed by: INTERNAL MEDICINE

## 2024-01-27 PROCEDURE — 2700000000 HC OXYGEN THERAPY PER DAY

## 2024-01-27 PROCEDURE — 36415 COLL VENOUS BLD VENIPUNCTURE: CPT

## 2024-01-27 PROCEDURE — 2580000003 HC RX 258: Performed by: INTERNAL MEDICINE

## 2024-01-27 PROCEDURE — 82962 GLUCOSE BLOOD TEST: CPT

## 2024-01-27 RX ORDER — AMLODIPINE BESYLATE 10 MG/1
10 TABLET ORAL DAILY
Qty: 30 TABLET | Refills: 3 | Status: SHIPPED | OUTPATIENT
Start: 2024-01-28

## 2024-01-27 RX ORDER — LEVOFLOXACIN 750 MG/1
750 TABLET, FILM COATED ORAL
Qty: 1 TABLET | Refills: 0 | Status: SHIPPED | OUTPATIENT
Start: 2024-01-29 | End: 2024-01-30

## 2024-01-27 RX ADMIN — ASPIRIN 81 MG: 81 TABLET, CHEWABLE ORAL at 10:39

## 2024-01-27 RX ADMIN — BUMETANIDE 2 MG: 1 TABLET ORAL at 10:40

## 2024-01-27 RX ADMIN — LEVOFLOXACIN 750 MG: 750 TABLET, FILM COATED ORAL at 10:39

## 2024-01-27 RX ADMIN — IPRATROPIUM BROMIDE AND ALBUTEROL SULFATE 1 DOSE: 2.5; .5 SOLUTION RESPIRATORY (INHALATION) at 09:31

## 2024-01-27 RX ADMIN — CARVEDILOL 25 MG: 12.5 TABLET, FILM COATED ORAL at 10:39

## 2024-01-27 RX ADMIN — ENOXAPARIN SODIUM 30 MG: 100 INJECTION SUBCUTANEOUS at 10:40

## 2024-01-27 RX ADMIN — ISOSORBIDE MONONITRATE 60 MG: 30 TABLET, EXTENDED RELEASE ORAL at 10:40

## 2024-01-27 RX ADMIN — SODIUM CHLORIDE, PRESERVATIVE FREE 10 ML: 5 INJECTION INTRAVENOUS at 10:42

## 2024-01-27 RX ADMIN — SODIUM CHLORIDE, PRESERVATIVE FREE 40 MG: 5 INJECTION INTRAVENOUS at 10:38

## 2024-01-27 RX ADMIN — AMLODIPINE BESYLATE 10 MG: 5 TABLET ORAL at 10:40

## 2024-01-27 RX ADMIN — BUDESONIDE 500 MCG: 0.5 INHALANT RESPIRATORY (INHALATION) at 09:31

## 2024-01-27 RX ADMIN — POTASSIUM CHLORIDE 20 MEQ: 1500 TABLET, EXTENDED RELEASE ORAL at 10:39

## 2024-01-27 RX ADMIN — Medication 400 MG: at 10:40

## 2024-01-27 NOTE — CARE COORDINATION
Transition of Care Plan:    RUR: 23%-High Risk\"  Prior Level of Functioning: Requires assistance with her ADLs and IADLs  Disposition: Home with home health services-[facility] has accepted the patient and is aware she is discharging home today 1/27/24  If SNF or IPR: Date FOC offered: N/A  Follow up appointments: PCP & Specialists as indicated  DME needed: The patient has home O2, CPAP, rollator, and wheelchair   Transportation at discharge: The patient's  is at bedside and will be transporting her home   IM/IMM Medicare/ letter given: 2nd IM letter sent to patient's  yesterday, 1/26/24  Is patient a  and connected with VA? N/A   If yes, was [place] transfer form completed and VA notified?   Caregiver Contact: Wesley Oakes, , Phone: [phone]  Discharge Caregiver contacted prior to discharge? Yes, CM spoke to the patient's  via phone   Care Conference needed? No  Barriers to discharge: N/A, patient is being discharged today, 1/27/24    CM was alerted that the patient is being discharged today, 1/27/24. CM spoke to the patient's  via phone and he stated that he is at bedside and is going to transport the patient home. He stated that he received a call from [facility] yesterday, 1/26/24. CM also contacted [facility] today, 1/27/24, and alerted them that the patient is being discharged today, 1/27/24. 2nd IM letter was completed yesterday, 1/26/24.     From CM perspective, the patient can be discharged.     Mya Hinton, [facility]  [phone]

## 2024-01-27 NOTE — DISCHARGE SUMMARY
Discharge Summary    Name: Marilee Oakes  [id number]  YOB: 1950 (Age: 73 y.o.)   Date of Admission: 1/22/2024  Date of Discharge: 1/27/2024  Attending Physician: Dr. Mendel    Discharge Diagnosis:     CHF with ischemic and nonischemic cardiomyopathy   pulmonary edema (4L at home)  Acute on chronic systolic CHF  A.fib   Status post pacemaker  S/p Watchman implant 2021  COVID infection  Pan-sensitive Klebsiella UTI  Encephalopathy  Hypernatremia   History of recent Staph aureus bacteremia 12/2023   LEELA on CPAP   Diabetes mellitus with neuropathy   CKD stage III   COPD   Hypertensive urgency     Consultations:  IP WOUND CARE NURSE CONSULT TO EVAL  IP CONSULT TO CASE MANAGEMENT      Brief Admission History/Reason for Admission Per Marie Lopez MD:       Brief Hospital Course by Main Problems:     CHF with ischemic and nonischemic cardiomyopathy   pulmonary edema (4L at home)  Acute on chronic systolic CHF  A.fib   Status post pacemaker  S/p Watchman implant 2021  COVID infection  D-dimer 3.8  Continue Coreg  Wean oxygen down as tolerated  Cardio input appreciated  Continue with baby aspirin twice daily  Currently on 2 L nasal cannula  - O2 hanging off her nose, encouraged her to wear this O2  - transitioned from IV bumex 2mg bid back to home bumex 2mg po daily  - creatine and sodium slightly elevated. Patient and family want to leave. Recommended that patient hold on bumex and resume taking this Tuesday to give her time to get more hydrated. Recommended they followup with PCP within 1 week for a bmp check  - outpatient follouwp with Cardiology     Pan-sensitive Klebsiella UTI  Encephalopathy  Urine culture growing Pan-sensitive Klebsiella   - continuing Levoflox for a longer course of 7 days given intermittent encephalopathy, rx given for discharge  - mental status improving     Hypertensive urgency  Resume oral home medication medication  Bumex noted above  Increase

## 2024-01-27 NOTE — DISCHARGE INSTRUCTIONS
You were treated for heart failure and COVID infection, as well as UTI. Please take the medications as listed and followup with your primary care doctor; ask them to check your kidney function within 1 week. Followup with Cardiology as well. Restart the bumex (bumetanide) on TUESDAY 1/30/24.

## 2024-01-27 NOTE — PROGRESS NOTES
Bedside and Verbal shift change report given to Leila ALARCON (oncoming nurse) by Monique ALARCON (offgoing nurse). Report included the following information Nurse Handoff Report, Adult Overview, MAR, and Recent Results.     0750: Pt's  demanding that pt. Be discharged or he will leave AMA. Mendel MD aware.     1130: Pt's BMP results came back abnormal. Pt. Presents with confusion and hallucinations and a change in mentation from yesterday morning. Mendel MD aware.     DISCHARGE SUMMARY FROM [facility] NURSE    The patient is stable for discharge. I have reviewed the discharge instructions with the spouse. The spouse verbalized understanding. All questions were fully answered. The spouse verbalized no complaints.    Hard scripts and medication handouts were given and reviewed with the spouse. Appropriate educational materials and medication side effects teaching were also provided.    Cardiac monitor and IV line(s) were removed.     There were no personal belongings, valuables or home medications left at patient's bedside,  or safe.     Leila Mead RN, 1/27/2024 12:56 PM

## 2024-01-27 NOTE — PLAN OF CARE
Problem: Discharge Planning  Goal: Discharge to home or other facility with appropriate resources  1/27/2024 0756 by Leila Mead RN  Outcome: Progressing  1/27/2024 0411 by Shelia Montero RN  Outcome: Progressing     Problem: Pain  Goal: Verbalizes/displays adequate comfort level or baseline comfort level  1/27/2024 0756 by Leila Mead RN  Outcome: Progressing  1/27/2024 0411 by Shelia Montero RN  Outcome: Progressing     Problem: Safety - Adult  Goal: Free from fall injury  Outcome: Progressing     Problem: Chronic Conditions and Co-morbidities  Goal: Patient's chronic conditions and co-morbidity symptoms are monitored and maintained or improved  1/27/2024 0756 by Leila Mead RN  Outcome: Progressing  1/27/2024 0411 by Shelia Montero RN  Outcome: Progressing     Problem: Respiratory - Adult  Goal: Achieves optimal ventilation and oxygenation  1/27/2024 0756 by Leila Mead RN  Outcome: Progressing  1/27/2024 0411 by Shelia Montero RN  Outcome: Progressing  1/26/2024 2340 by Edison Lake RCP  Outcome: Progressing     Problem: Skin/Tissue Integrity  Goal: Absence of new skin breakdown  Description: 1.  Monitor for areas of redness and/or skin breakdown  2.  Assess vascular access sites hourly  3.  Every 4-6 hours minimum:  Change oxygen saturation probe site  4.  Every 4-6 hours:  If on nasal continuous positive airway pressure, respiratory therapy assess nares and determine need for appliance change or resting period.  1/27/2024 0756 by Leila Mead RN  Outcome: Progressing  1/27/2024 0411 by Shelia Montero RN  Outcome: Progressing

## 2024-01-27 NOTE — PROGRESS NOTES
EP/ ARRHYTHMIA Progress Note    Patient ID:  Patient: Marilee Oakes  MRN: [id number]  Age: 73 y.o.  : 1950    Date of  Admission: 2024  4:28 PM   PCP:  Parish Dumont APRN - NP  Usual cardiologist:  Bruce Abbott MD    Assessment:   Acute on chronic systolic CHF.  Echo 2023 EF 50-55%, moderate aortic valve stenosis.  EF improved with BIV pacing and medication.    Net IO Since Admission: -5,705 mL [24 1145]    No data found.      COVID infection.  Recent MRSA bacteremia 2023.  JUAN MANUEL without evidence of bacterial endocarditis.  Permanent atrial fibrillation.  Remote [facility] [facility] BIV-ICD upgrade and AV node ablation in 10/2018, she is pacemaker-dependent.  She has a retained disconnected/capped RV pacing lead--so MRI incompatible.  Chronic mixed ischemic/nonischemic cardiomyopathy EF 35-40% in 2020, old anterior infarct.  This improved with BIV pacing and medication.  Hypertensive heart disease with heart failure and CKD stage 3.  Lower extremity venous insufficiency with venous ablation in .  History of LLE ulcer.  Has right foot ulcer.  DM type 2 without chronic insulin.  Diabetic neuropathy.  Watchman implant in , transitioned from AC to DAPT and then ASA monotherapy.  Klebsiella pneumoniae UTI.  DNR.     Plan:     Agree with change from IV to less aggressive home oral diuretic, volume contracted by labs.  Noted is that she has shock therapies enabled in the setting of DNR.  I don't think any BIV pacing reprogramming is needed at this time.  Continue carvedilol.  Increased amlodipine to 10 mg daily.  Increased baby ASA to TWICE DAILY here, can go to once daily on discharge.  Klebsiella pneumoniae UTI noted, treated with levofloxacin.    Stable from a cardiac standpoint.    :    Feels better on room air.  Hold bumex for two days then resume.  Discussed with Dr Stoddard        [x]       High complexity decision making was performed in this patient at

## 2024-01-27 NOTE — PROGRESS NOTES
0500 Pt confused throughout the night and having hallucinations of people who are not in the room. Pt placed legs up on bedside table two times. Able to be re-oriented and redirected about half the time.Baby monitor placed in room. Bed alarm on.

## 2024-01-27 NOTE — PLAN OF CARE
Problem: Discharge Planning  Goal: Discharge to home or other facility with appropriate resources  Outcome: Progressing     Problem: Pain  Goal: Verbalizes/displays adequate comfort level or baseline comfort level  Outcome: Progressing     Problem: Chronic Conditions and Co-morbidities  Goal: Patient's chronic conditions and co-morbidity symptoms are monitored and maintained or improved  Outcome: Progressing     Problem: Respiratory - Adult  Goal: Achieves optimal ventilation and oxygenation  1/26/2024 2340 by Edison Lake, ZOHRA  Outcome: Progressing     Problem: Skin/Tissue Integrity  Goal: Absence of new skin breakdown  Description: 1.  Monitor for areas of redness and/or skin breakdown  2.  Assess vascular access sites hourly  3.  Every 4-6 hours minimum:  Change oxygen saturation probe site  4.  Every 4-6 hours:  If on nasal continuous positive airway pressure, respiratory therapy assess nares and determine need for appliance change or resting period.  Outcome: Progressing

## 2024-02-01 ENCOUNTER — TELEPHONE (OUTPATIENT)
Facility: CLINIC | Age: 74
End: 2024-02-01

## 2024-02-01 NOTE — TELEPHONE ENCOUNTER
Patient's  called requesting an order for a hospital bed from her pcp, Parish Dumont. Please advise.

## 2024-02-02 ENCOUNTER — TELEPHONE (OUTPATIENT)
Facility: CLINIC | Age: 74
End: 2024-02-02

## 2024-02-02 NOTE — TELEPHONE ENCOUNTER
Michael Ha: We told the patient's  that Ritu will get in touch with Chas[facility] to get the paperwork filled out for a hospital bed for their home. The paperwork is in the .     CB (): [phone]

## 2024-02-02 NOTE — TELEPHONE ENCOUNTER
Patients  is asking for a call back regarding this request.  He is asking to get a hospital bed for their home.

## 2024-02-06 DIAGNOSIS — J44.9 CHRONIC OBSTRUCTIVE PULMONARY DISEASE, UNSPECIFIED COPD TYPE (HCC): Primary | ICD-10-CM

## 2024-02-06 DIAGNOSIS — I50.9 HEART FAILURE, UNSPECIFIED HF CHRONICITY, UNSPECIFIED HEART FAILURE TYPE (HCC): ICD-10-CM

## 2024-02-19 NOTE — TELEPHONE ENCOUNTER
Nikhil Kim patient was able to get hospital bed. Dr. Calderon had filled out the information for the patient to get a hospital bed. Thank you for following up on this

## 2024-02-23 ENCOUNTER — TELEPHONE (OUTPATIENT)
Facility: CLINIC | Age: 74
End: 2024-02-23

## 2024-02-23 NOTE — TELEPHONE ENCOUNTER
----- Message from Anivalbernie Arango sent at 2/20/2024  3:35 PM EST -----  Subject: Message to Provider    QUESTIONS  Information for Provider? Wesley called in on behalf of Marilee, returning   a call from Janny. Please call the patient back (Wesley).  ---------------------------------------------------------------------------  --------------  CALL BACK INFO  [phone]; OK to leave message on voicemail  ---------------------------------------------------------------------------  --------------  SCRIPT ANSWERS  Relationship to Patient? Spouse/Partner  Representative Name? Wesley  Is the representative on the Communication Release of Information (REINA)   form in Epic? Yes

## 2024-03-22 ENCOUNTER — APPOINTMENT (OUTPATIENT)
Facility: HOSPITAL | Age: 74
DRG: 493 | End: 2024-03-22
Payer: MEDICARE

## 2024-03-22 ENCOUNTER — HOSPITAL ENCOUNTER (INPATIENT)
Facility: HOSPITAL | Age: 74
LOS: 4 days | Discharge: NURSING, REHAB OR LONG-TERM CARE | DRG: 493 | End: 2024-03-26
Attending: INTERNAL MEDICINE | Admitting: INTERNAL MEDICINE
Payer: MEDICARE

## 2024-03-22 DIAGNOSIS — S82.892A CLOSED FRACTURE OF LEFT ANKLE, INITIAL ENCOUNTER: Primary | ICD-10-CM

## 2024-03-22 PROBLEM — S82.842A BIMALLEOLAR FRACTURE OF LEFT ANKLE, CLOSED, INITIAL ENCOUNTER: Status: ACTIVE | Noted: 2024-03-22

## 2024-03-22 LAB
ALBUMIN SERPL-MCNC: 3.2 G/DL (ref 3.5–5)
ALBUMIN/GLOB SERPL: 0.9 (ref 1.1–2.2)
ALP SERPL-CCNC: 123 U/L (ref 45–117)
ALT SERPL-CCNC: 17 U/L (ref 12–78)
ANION GAP SERPL CALC-SCNC: ABNORMAL MMOL/L (ref 5–15)
APPEARANCE UR: CLEAR
AST SERPL-CCNC: 22 U/L (ref 15–37)
BACTERIA URNS QL MICRO: NEGATIVE /HPF
BASOPHILS # BLD: 0 K/UL (ref 0–0.1)
BASOPHILS NFR BLD: 0 % (ref 0–1)
BILIRUB SERPL-MCNC: 1.8 MG/DL (ref 0.2–1)
BILIRUB UR QL: NEGATIVE
BUN SERPL-MCNC: 14 MG/DL (ref 6–20)
BUN/CREAT SERPL: 11 (ref 12–20)
CALCIUM SERPL-MCNC: 9.4 MG/DL (ref 8.5–10.1)
CHLORIDE SERPL-SCNC: 112 MMOL/L (ref 97–108)
CO2 SERPL-SCNC: 33 MMOL/L (ref 21–32)
COLOR UR: ABNORMAL
CREAT SERPL-MCNC: 1.27 MG/DL (ref 0.55–1.02)
DIFFERENTIAL METHOD BLD: ABNORMAL
EOSINOPHIL # BLD: 0.7 K/UL (ref 0–0.4)
EOSINOPHIL NFR BLD: 10 % (ref 0–7)
EPITH CASTS URNS QL MICRO: ABNORMAL /LPF
ERYTHROCYTE [DISTWIDTH] IN BLOOD BY AUTOMATED COUNT: 19.2 % (ref 11.5–14.5)
GLOBULIN SER CALC-MCNC: 3.6 G/DL (ref 2–4)
GLUCOSE BLD STRIP.AUTO-MCNC: 179 MG/DL (ref 65–117)
GLUCOSE BLD STRIP.AUTO-MCNC: 193 MG/DL (ref 65–117)
GLUCOSE SERPL-MCNC: 127 MG/DL (ref 65–100)
GLUCOSE UR STRIP.AUTO-MCNC: NEGATIVE MG/DL
HCT VFR BLD AUTO: 36.2 % (ref 35–47)
HGB BLD-MCNC: 10.5 G/DL (ref 11.5–16)
HGB UR QL STRIP: NEGATIVE
IMM GRANULOCYTES # BLD AUTO: 0 K/UL (ref 0–0.04)
IMM GRANULOCYTES NFR BLD AUTO: 0 % (ref 0–0.5)
KETONES UR QL STRIP.AUTO: NEGATIVE MG/DL
LEUKOCYTE ESTERASE UR QL STRIP.AUTO: ABNORMAL
LYMPHOCYTES # BLD: 0.7 K/UL (ref 0.8–3.5)
LYMPHOCYTES NFR BLD: 11 % (ref 12–49)
MCH RBC QN AUTO: 25.5 PG (ref 26–34)
MCHC RBC AUTO-ENTMCNC: 29 G/DL (ref 30–36.5)
MCV RBC AUTO: 87.9 FL (ref 80–99)
MONOCYTES # BLD: 0.3 K/UL (ref 0–1)
MONOCYTES NFR BLD: 4 % (ref 5–13)
NEUTS SEG # BLD: 5.1 K/UL (ref 1.8–8)
NEUTS SEG NFR BLD: 75 % (ref 32–75)
NITRITE UR QL STRIP.AUTO: POSITIVE
NRBC # BLD: 0 K/UL (ref 0–0.01)
NRBC BLD-RTO: 0 PER 100 WBC
PH UR STRIP: 6.5 (ref 5–8)
PLATELET # BLD AUTO: 171 K/UL (ref 150–400)
PMV BLD AUTO: 11.6 FL (ref 8.9–12.9)
POTASSIUM SERPL-SCNC: 4.3 MMOL/L (ref 3.5–5.1)
PROT SERPL-MCNC: 6.8 G/DL (ref 6.4–8.2)
PROT UR STRIP-MCNC: ABNORMAL MG/DL
RBC # BLD AUTO: 4.12 M/UL (ref 3.8–5.2)
RBC #/AREA URNS HPF: ABNORMAL /HPF (ref 0–5)
RBC MORPH BLD: ABNORMAL
RBC MORPH BLD: ABNORMAL
SERVICE CMNT-IMP: ABNORMAL
SERVICE CMNT-IMP: ABNORMAL
SODIUM SERPL-SCNC: 144 MMOL/L (ref 136–145)
SP GR UR REFRACTOMETRY: 1.01
URINE CULTURE IF INDICATED: ABNORMAL
UROBILINOGEN UR QL STRIP.AUTO: 1 EU/DL (ref 0.2–1)
WBC # BLD AUTO: 6.8 K/UL (ref 3.6–11)
WBC URNS QL MICRO: ABNORMAL /HPF (ref 0–4)

## 2024-03-22 PROCEDURE — 6360000002 HC RX W HCPCS: Performed by: INTERNAL MEDICINE

## 2024-03-22 PROCEDURE — 94640 AIRWAY INHALATION TREATMENT: CPT

## 2024-03-22 PROCEDURE — 71045 X-RAY EXAM CHEST 1 VIEW: CPT

## 2024-03-22 PROCEDURE — 87086 URINE CULTURE/COLONY COUNT: CPT

## 2024-03-22 PROCEDURE — 73610 X-RAY EXAM OF ANKLE: CPT

## 2024-03-22 PROCEDURE — 36415 COLL VENOUS BLD VENIPUNCTURE: CPT

## 2024-03-22 PROCEDURE — APPNB15 APP NON BILLABLE TIME 0-15 MINS: Performed by: PHYSICIAN ASSISTANT

## 2024-03-22 PROCEDURE — 94664 DEMO&/EVAL PT USE INHALER: CPT

## 2024-03-22 PROCEDURE — 6370000000 HC RX 637 (ALT 250 FOR IP): Performed by: INTERNAL MEDICINE

## 2024-03-22 PROCEDURE — 82962 GLUCOSE BLOOD TEST: CPT

## 2024-03-22 PROCEDURE — 99285 EMERGENCY DEPT VISIT HI MDM: CPT

## 2024-03-22 PROCEDURE — 2700000000 HC OXYGEN THERAPY PER DAY

## 2024-03-22 PROCEDURE — 2500000003 HC RX 250 WO HCPCS: Performed by: PHYSICIAN ASSISTANT

## 2024-03-22 PROCEDURE — 85025 COMPLETE CBC W/AUTO DIFF WBC: CPT

## 2024-03-22 PROCEDURE — 93005 ELECTROCARDIOGRAM TRACING: CPT | Performed by: INTERNAL MEDICINE

## 2024-03-22 PROCEDURE — 81001 URINALYSIS AUTO W/SCOPE: CPT

## 2024-03-22 PROCEDURE — 6360000002 HC RX W HCPCS: Performed by: PHYSICIAN ASSISTANT

## 2024-03-22 PROCEDURE — 80053 COMPREHEN METABOLIC PANEL: CPT

## 2024-03-22 PROCEDURE — 1100000000 HC RM PRIVATE

## 2024-03-22 PROCEDURE — 2580000003 HC RX 258: Performed by: INTERNAL MEDICINE

## 2024-03-22 RX ORDER — LANOLIN ALCOHOL/MO/W.PET/CERES
400 CREAM (GRAM) TOPICAL 2 TIMES DAILY
Status: DISCONTINUED | OUTPATIENT
Start: 2024-03-22 | End: 2024-03-26 | Stop reason: HOSPADM

## 2024-03-22 RX ORDER — MORPHINE SULFATE 2 MG/ML
1 INJECTION, SOLUTION INTRAMUSCULAR; INTRAVENOUS EVERY 4 HOURS PRN
Status: DISCONTINUED | OUTPATIENT
Start: 2024-03-22 | End: 2024-03-26 | Stop reason: HOSPADM

## 2024-03-22 RX ORDER — ARFORMOTEROL TARTRATE 15 UG/2ML
15 SOLUTION RESPIRATORY (INHALATION)
Status: DISCONTINUED | OUTPATIENT
Start: 2024-03-22 | End: 2024-03-26 | Stop reason: HOSPADM

## 2024-03-22 RX ORDER — SODIUM CHLORIDE 0.9 % (FLUSH) 0.9 %
5-40 SYRINGE (ML) INJECTION PRN
Status: DISCONTINUED | OUTPATIENT
Start: 2024-03-22 | End: 2024-03-26 | Stop reason: HOSPADM

## 2024-03-22 RX ORDER — AMLODIPINE BESYLATE 5 MG/1
10 TABLET ORAL DAILY
Status: DISCONTINUED | OUTPATIENT
Start: 2024-03-22 | End: 2024-03-26 | Stop reason: HOSPADM

## 2024-03-22 RX ORDER — PREGABALIN 75 MG/1
300 CAPSULE ORAL DAILY
Status: DISCONTINUED | OUTPATIENT
Start: 2024-03-22 | End: 2024-03-26 | Stop reason: HOSPADM

## 2024-03-22 RX ORDER — CASTOR OIL AND BALSAM, PERU 788; 87 MG/G; MG/G
OINTMENT TOPICAL 2 TIMES DAILY
Status: DISCONTINUED | OUTPATIENT
Start: 2024-03-22 | End: 2024-03-26 | Stop reason: HOSPADM

## 2024-03-22 RX ORDER — TRAMADOL HYDROCHLORIDE 50 MG/1
50 TABLET ORAL EVERY 6 HOURS PRN
Status: DISCONTINUED | OUTPATIENT
Start: 2024-03-22 | End: 2024-03-26 | Stop reason: HOSPADM

## 2024-03-22 RX ORDER — PANTOPRAZOLE SODIUM 40 MG/1
40 TABLET, DELAYED RELEASE ORAL
Status: DISCONTINUED | OUTPATIENT
Start: 2024-03-23 | End: 2024-03-26 | Stop reason: HOSPADM

## 2024-03-22 RX ORDER — LEVOFLOXACIN 5 MG/ML
750 INJECTION, SOLUTION INTRAVENOUS EVERY 24 HOURS
Status: COMPLETED | OUTPATIENT
Start: 2024-03-22 | End: 2024-03-24

## 2024-03-22 RX ORDER — SODIUM CHLORIDE 0.9 % (FLUSH) 0.9 %
5-40 SYRINGE (ML) INJECTION EVERY 12 HOURS SCHEDULED
Status: DISCONTINUED | OUTPATIENT
Start: 2024-03-22 | End: 2024-03-26 | Stop reason: HOSPADM

## 2024-03-22 RX ORDER — INSULIN LISPRO 100 [IU]/ML
0-4 INJECTION, SOLUTION INTRAVENOUS; SUBCUTANEOUS NIGHTLY
Status: DISCONTINUED | OUTPATIENT
Start: 2024-03-22 | End: 2024-03-26 | Stop reason: HOSPADM

## 2024-03-22 RX ORDER — ACETAMINOPHEN 325 MG/1
650 TABLET ORAL EVERY 6 HOURS PRN
Status: DISCONTINUED | OUTPATIENT
Start: 2024-03-22 | End: 2024-03-26 | Stop reason: HOSPADM

## 2024-03-22 RX ORDER — MIDAZOLAM HYDROCHLORIDE 1 MG/ML
2 INJECTION, SOLUTION INTRAMUSCULAR; INTRAVENOUS ONCE
Status: COMPLETED | OUTPATIENT
Start: 2024-03-22 | End: 2024-03-22

## 2024-03-22 RX ORDER — ONDANSETRON 4 MG/1
4 TABLET, ORALLY DISINTEGRATING ORAL EVERY 8 HOURS PRN
Status: DISCONTINUED | OUTPATIENT
Start: 2024-03-22 | End: 2024-03-23 | Stop reason: SDUPTHER

## 2024-03-22 RX ORDER — ACETAMINOPHEN 650 MG/1
650 SUPPOSITORY RECTAL EVERY 6 HOURS PRN
Status: DISCONTINUED | OUTPATIENT
Start: 2024-03-22 | End: 2024-03-26 | Stop reason: HOSPADM

## 2024-03-22 RX ORDER — VENLAFAXINE HYDROCHLORIDE 150 MG/1
150 CAPSULE, EXTENDED RELEASE ORAL 2 TIMES DAILY WITH MEALS
Status: DISCONTINUED | OUTPATIENT
Start: 2024-03-22 | End: 2024-03-26

## 2024-03-22 RX ORDER — CARVEDILOL 12.5 MG/1
25 TABLET ORAL 2 TIMES DAILY WITH MEALS
Status: DISCONTINUED | OUTPATIENT
Start: 2024-03-22 | End: 2024-03-26 | Stop reason: HOSPADM

## 2024-03-22 RX ORDER — POLYETHYLENE GLYCOL 3350 17 G/17G
17 POWDER, FOR SOLUTION ORAL DAILY PRN
Status: DISCONTINUED | OUTPATIENT
Start: 2024-03-22 | End: 2024-03-26 | Stop reason: HOSPADM

## 2024-03-22 RX ORDER — MIDAZOLAM HYDROCHLORIDE 1 MG/ML
5 INJECTION, SOLUTION INTRAMUSCULAR; INTRAVENOUS ONCE
Status: DISCONTINUED | OUTPATIENT
Start: 2024-03-22 | End: 2024-03-22

## 2024-03-22 RX ORDER — KETAMINE HCL IN NACL, ISO-OSM 100MG/10ML
0.4 SYRINGE (ML) INJECTION ONCE
Status: COMPLETED | OUTPATIENT
Start: 2024-03-22 | End: 2024-03-22

## 2024-03-22 RX ORDER — BUMETANIDE 1 MG/1
2 TABLET ORAL DAILY
Status: DISCONTINUED | OUTPATIENT
Start: 2024-03-22 | End: 2024-03-26 | Stop reason: HOSPADM

## 2024-03-22 RX ORDER — INSULIN LISPRO 100 [IU]/ML
0-4 INJECTION, SOLUTION INTRAVENOUS; SUBCUTANEOUS
Status: DISCONTINUED | OUTPATIENT
Start: 2024-03-22 | End: 2024-03-26 | Stop reason: HOSPADM

## 2024-03-22 RX ORDER — POTASSIUM CHLORIDE 20 MEQ/1
20 TABLET, EXTENDED RELEASE ORAL DAILY
Status: DISCONTINUED | OUTPATIENT
Start: 2024-03-22 | End: 2024-03-26 | Stop reason: HOSPADM

## 2024-03-22 RX ORDER — ASPIRIN 81 MG/1
81 TABLET, CHEWABLE ORAL DAILY
Status: DISCONTINUED | OUTPATIENT
Start: 2024-03-22 | End: 2024-03-26 | Stop reason: HOSPADM

## 2024-03-22 RX ORDER — CLONAZEPAM 0.5 MG/1
0.5 TABLET ORAL NIGHTLY
Status: DISCONTINUED | OUTPATIENT
Start: 2024-03-22 | End: 2024-03-26 | Stop reason: HOSPADM

## 2024-03-22 RX ORDER — ISOSORBIDE MONONITRATE 30 MG/1
60 TABLET, EXTENDED RELEASE ORAL DAILY
Status: DISCONTINUED | OUTPATIENT
Start: 2024-03-22 | End: 2024-03-26 | Stop reason: HOSPADM

## 2024-03-22 RX ORDER — ONDANSETRON 2 MG/ML
4 INJECTION INTRAMUSCULAR; INTRAVENOUS EVERY 6 HOURS PRN
Status: DISCONTINUED | OUTPATIENT
Start: 2024-03-22 | End: 2024-03-23 | Stop reason: SDUPTHER

## 2024-03-22 RX ORDER — BUDESONIDE 0.5 MG/2ML
0.5 INHALANT ORAL
Status: DISCONTINUED | OUTPATIENT
Start: 2024-03-22 | End: 2024-03-26 | Stop reason: HOSPADM

## 2024-03-22 RX ORDER — ALBUTEROL SULFATE 2.5 MG/3ML
2.5 SOLUTION RESPIRATORY (INHALATION) EVERY 6 HOURS PRN
Status: DISCONTINUED | OUTPATIENT
Start: 2024-03-22 | End: 2024-03-26 | Stop reason: HOSPADM

## 2024-03-22 RX ORDER — SODIUM CHLORIDE 9 MG/ML
INJECTION, SOLUTION INTRAVENOUS PRN
Status: DISCONTINUED | OUTPATIENT
Start: 2024-03-22 | End: 2024-03-26 | Stop reason: HOSPADM

## 2024-03-22 RX ADMIN — ARFORMOTEROL TARTRATE 15 MCG: 15 SOLUTION RESPIRATORY (INHALATION) at 09:54

## 2024-03-22 RX ADMIN — POTASSIUM CHLORIDE 20 MEQ: 1500 TABLET, EXTENDED RELEASE ORAL at 11:05

## 2024-03-22 RX ADMIN — Medication 41 MG: at 11:38

## 2024-03-22 RX ADMIN — CARVEDILOL 25 MG: 12.5 TABLET, FILM COATED ORAL at 11:06

## 2024-03-22 RX ADMIN — BUMETANIDE 2 MG: 1 TABLET ORAL at 11:03

## 2024-03-22 RX ADMIN — ARFORMOTEROL TARTRATE 15 MCG: 15 SOLUTION RESPIRATORY (INHALATION) at 19:21

## 2024-03-22 RX ADMIN — AMLODIPINE BESYLATE 10 MG: 5 TABLET ORAL at 11:06

## 2024-03-22 RX ADMIN — Medication: at 21:32

## 2024-03-22 RX ADMIN — Medication 400 MG: at 11:07

## 2024-03-22 RX ADMIN — SODIUM CHLORIDE, PRESERVATIVE FREE 10 ML: 5 INJECTION INTRAVENOUS at 11:08

## 2024-03-22 RX ADMIN — SODIUM CHLORIDE, PRESERVATIVE FREE 5 ML: 5 INJECTION INTRAVENOUS at 21:32

## 2024-03-22 RX ADMIN — TRAMADOL HYDROCHLORIDE 50 MG: 50 TABLET ORAL at 15:30

## 2024-03-22 RX ADMIN — MIDAZOLAM HYDROCHLORIDE 2 MG: 1 INJECTION, SOLUTION INTRAMUSCULAR; INTRAVENOUS at 09:58

## 2024-03-22 RX ADMIN — ISOSORBIDE MONONITRATE 60 MG: 30 TABLET, EXTENDED RELEASE ORAL at 11:01

## 2024-03-22 RX ADMIN — ASPIRIN 81 MG: 81 TABLET, CHEWABLE ORAL at 10:59

## 2024-03-22 RX ADMIN — BUDESONIDE 500 MCG: 0.5 INHALANT RESPIRATORY (INHALATION) at 09:54

## 2024-03-22 RX ADMIN — LEVOFLOXACIN 750 MG: 5 INJECTION, SOLUTION INTRAVENOUS at 15:23

## 2024-03-22 RX ADMIN — BUDESONIDE 500 MCG: 0.5 INHALANT RESPIRATORY (INHALATION) at 19:21

## 2024-03-22 RX ADMIN — CARVEDILOL 25 MG: 12.5 TABLET, FILM COATED ORAL at 16:43

## 2024-03-22 RX ADMIN — Medication 400 MG: at 21:24

## 2024-03-22 RX ADMIN — TRAMADOL HYDROCHLORIDE 50 MG: 50 TABLET ORAL at 21:30

## 2024-03-22 RX ADMIN — CLONAZEPAM 0.5 MG: 0.5 TABLET ORAL at 21:25

## 2024-03-22 RX ADMIN — PREGABALIN 300 MG: 150 CAPSULE ORAL at 11:03

## 2024-03-22 RX ADMIN — Medication: at 15:20

## 2024-03-22 ASSESSMENT — PAIN DESCRIPTION - FREQUENCY
FREQUENCY: INTERMITTENT

## 2024-03-22 ASSESSMENT — PAIN DESCRIPTION - ONSET
ONSET: ON-GOING
ONSET: ON-GOING
ONSET: SUDDEN
ONSET: SUDDEN

## 2024-03-22 ASSESSMENT — PAIN DESCRIPTION - ORIENTATION
ORIENTATION: LEFT

## 2024-03-22 ASSESSMENT — PAIN DESCRIPTION - LOCATION
LOCATION: ANKLE

## 2024-03-22 ASSESSMENT — PAIN - FUNCTIONAL ASSESSMENT
PAIN_FUNCTIONAL_ASSESSMENT: 0-10
PAIN_FUNCTIONAL_ASSESSMENT: PREVENTS OR INTERFERES SOME ACTIVE ACTIVITIES AND ADLS

## 2024-03-22 ASSESSMENT — PAIN DESCRIPTION - PAIN TYPE
TYPE: ACUTE PAIN

## 2024-03-22 ASSESSMENT — PAIN SCALES - GENERAL
PAINLEVEL_OUTOF10: 6
PAINLEVEL_OUTOF10: 5
PAINLEVEL_OUTOF10: 8
PAINLEVEL_OUTOF10: 0
PAINLEVEL_OUTOF10: 7
PAINLEVEL_OUTOF10: 5

## 2024-03-22 ASSESSMENT — PAIN DESCRIPTION - DESCRIPTORS
DESCRIPTORS: ACHING
DESCRIPTORS: ACHING
DESCRIPTORS: DISCOMFORT
DESCRIPTORS: SORE

## 2024-03-22 NOTE — ED PROVIDER NOTES
patient autonomously. My supervision physician was on site and available for consultation if needed.     I am the Primary Clinician of Record.   Rani Yeung PA-C (electronically signed)    (Please note that parts of this dictation were completed with voice recognition software. Quite often unanticipated grammatical, syntax, homophones, and other interpretive errors are inadvertently transcribed by the computer software. Please disregards these errors. Please excuse any errors that have escaped final proofreading.)        Rani Yeung PA-C  03/22/24 2231

## 2024-03-22 NOTE — ED NOTES
Assumed care of pt at this time. Pt arrives with reports of a GLF this morning d/t rollator slipping out from under her. Pt denies dizziness prior to fall, cp/sob. Pt has obvious deformity to L ankle. Pt on monitor x3 with call bell in reach.     0845: Pt resting with no complaints. Pt remains on monitor x3 with call bell in reach. Pt family at bedside.     0945: Pt has no complaints. Pt remains on monitor x3 with call bell in reach. Pt family at bedside.     1045: Pt placed on purewik for comfort at this time. Pt remains on monitor x3 with levi bell in reach. Pt family at bedside.     1145: Pt resting in ED stretcher and on monitor x3 with call bell in reach.

## 2024-03-22 NOTE — H&P
Hospitalist Admission Note    NAME:   Marilee Oakes   : 1950   MRN: [id number]     Date/Time: 3/22/2024 9:14 AM    Patient PCP: No primary care provider on file.    ______________________________________________________________________  Given the patient's current clinical presentation, I have a high level of concern for decompensation if discharged from the emergency department.  Complex decision making was performed, which includes reviewing the patient's available past medical records, laboratory results, and x-ray films.       My assessment of this patient's clinical condition and my plan of care is as follows.    Assessment / Plan:    Ground-level fall at home causing left bimalleolar ankle fracture POA  UTI POA  Preop cardiac/medical clearance  COPD on 2 L/m at baseline  Obesity with obstructive sleep apnea on CPAP  Hypertension   CAD  Chronic systolic CHF status post ICD  A-fib status post watchman's device  Hypercholesterolemia  Diabetes with neuropathy  CKD stage III  Creatinine 1.2  LFTs normal  Total bilirubin 1.8  Hemoglobin 10.5  UA positive for nitrite  Urine culture pending  X-ray left ankle= left bimalleolar ankle fracture with mild displacement noted  Chest x-ray clear  EKG= 76 bpm ventricular paced rhythm, no acute ST or T wave changes noted    Admit to orthopedic bed on remote telemetry  Pain management as needed  Cardiac diet with diabetic restrictions for tonight n.p.o. after midnight for ORIF tomorrow as planned by primary orthopedic team  Continue aspirin, amlodipine, Coreg, Imdur, home dose Bumex p.o.  Patient not on any ACE or ARB due to CKD  Inpatient cardiology consult for preop clearance due to complex cardiac history  Inpatient orthopedic consulted in ED-has splinted the left ankle for now after realignment  Fingersticks before every meal and at bedtime with dietary diabetic restrictions   Sliding scale lispro here, patient currently not on any antidiabetic medications but

## 2024-03-23 ENCOUNTER — ANESTHESIA (OUTPATIENT)
Facility: HOSPITAL | Age: 74
End: 2024-03-23
Payer: MEDICARE

## 2024-03-23 ENCOUNTER — APPOINTMENT (OUTPATIENT)
Facility: HOSPITAL | Age: 74
DRG: 493 | End: 2024-03-23
Payer: MEDICARE

## 2024-03-23 ENCOUNTER — ANESTHESIA EVENT (OUTPATIENT)
Facility: HOSPITAL | Age: 74
End: 2024-03-23
Payer: MEDICARE

## 2024-03-23 LAB
ABO + RH BLD: NORMAL
ANION GAP SERPL CALC-SCNC: 2 MMOL/L (ref 5–15)
BACTERIA SPEC CULT: NORMAL
BASOPHILS # BLD: 0 K/UL (ref 0–0.1)
BASOPHILS NFR BLD: 0 % (ref 0–1)
BLOOD GROUP ANTIBODIES SERPL: NORMAL
BUN SERPL-MCNC: 16 MG/DL (ref 6–20)
BUN/CREAT SERPL: 14 (ref 12–20)
CALCIUM SERPL-MCNC: 9 MG/DL (ref 8.5–10.1)
CHLORIDE SERPL-SCNC: 107 MMOL/L (ref 97–108)
CO2 SERPL-SCNC: 33 MMOL/L (ref 21–32)
CREAT SERPL-MCNC: 1.17 MG/DL (ref 0.55–1.02)
DIFFERENTIAL METHOD BLD: ABNORMAL
EKG ATRIAL RATE: 76 BPM
EKG DIAGNOSIS: NORMAL
EKG Q-T INTERVAL: 444 MS
EKG QRS DURATION: 138 MS
EKG QTC CALCULATION (BAZETT): 512 MS
EKG R AXIS: 259 DEGREES
EKG T AXIS: 77 DEGREES
EKG VENTRICULAR RATE: 80 BPM
EOSINOPHIL # BLD: 0.8 K/UL (ref 0–0.4)
EOSINOPHIL NFR BLD: 13 % (ref 0–7)
ERYTHROCYTE [DISTWIDTH] IN BLOOD BY AUTOMATED COUNT: 19.2 % (ref 11.5–14.5)
GLUCOSE BLD STRIP.AUTO-MCNC: 128 MG/DL (ref 65–117)
GLUCOSE BLD STRIP.AUTO-MCNC: 147 MG/DL (ref 65–117)
GLUCOSE BLD STRIP.AUTO-MCNC: 155 MG/DL (ref 65–117)
GLUCOSE BLD STRIP.AUTO-MCNC: 158 MG/DL (ref 65–117)
GLUCOSE BLD STRIP.AUTO-MCNC: 195 MG/DL (ref 65–117)
GLUCOSE SERPL-MCNC: 137 MG/DL (ref 65–100)
HCT VFR BLD AUTO: 31.5 % (ref 35–47)
HGB BLD-MCNC: 9.3 G/DL (ref 11.5–16)
IMM GRANULOCYTES # BLD AUTO: 0 K/UL (ref 0–0.04)
IMM GRANULOCYTES NFR BLD AUTO: 1 % (ref 0–0.5)
LYMPHOCYTES # BLD: 0.8 K/UL (ref 0.8–3.5)
LYMPHOCYTES NFR BLD: 13 % (ref 12–49)
MCH RBC QN AUTO: 25.6 PG (ref 26–34)
MCHC RBC AUTO-ENTMCNC: 29.5 G/DL (ref 30–36.5)
MCV RBC AUTO: 86.8 FL (ref 80–99)
MONOCYTES # BLD: 0.5 K/UL (ref 0–1)
MONOCYTES NFR BLD: 8 % (ref 5–13)
NEUTS SEG # BLD: 4.1 K/UL (ref 1.8–8)
NEUTS SEG NFR BLD: 65 % (ref 32–75)
NRBC # BLD: 0 K/UL (ref 0–0.01)
NRBC BLD-RTO: 0 PER 100 WBC
PLATELET # BLD AUTO: 141 K/UL (ref 150–400)
PMV BLD AUTO: 11.3 FL (ref 8.9–12.9)
POTASSIUM SERPL-SCNC: 3.2 MMOL/L (ref 3.5–5.1)
RBC # BLD AUTO: 3.63 M/UL (ref 3.8–5.2)
SERVICE CMNT-IMP: ABNORMAL
SERVICE CMNT-IMP: NORMAL
SODIUM SERPL-SCNC: 142 MMOL/L (ref 136–145)
SPECIMEN EXP DATE BLD: NORMAL
WBC # BLD AUTO: 6.2 K/UL (ref 3.6–11)

## 2024-03-23 PROCEDURE — 86900 BLOOD TYPING SEROLOGIC ABO: CPT

## 2024-03-23 PROCEDURE — 64445 NJX AA&/STRD SCIATIC NRV IMG: CPT | Performed by: STUDENT IN AN ORGANIZED HEALTH CARE EDUCATION/TRAINING PROGRAM

## 2024-03-23 PROCEDURE — 1100000000 HC RM PRIVATE

## 2024-03-23 PROCEDURE — 7100000001 HC PACU RECOVERY - ADDTL 15 MIN: Performed by: ORTHOPAEDIC SURGERY

## 2024-03-23 PROCEDURE — C1713 ANCHOR/SCREW BN/BN,TIS/BN: HCPCS | Performed by: ORTHOPAEDIC SURGERY

## 2024-03-23 PROCEDURE — 82962 GLUCOSE BLOOD TEST: CPT

## 2024-03-23 PROCEDURE — 6370000000 HC RX 637 (ALT 250 FOR IP): Performed by: ORTHOPAEDIC SURGERY

## 2024-03-23 PROCEDURE — 0QSK04Z REPOSITION LEFT FIBULA WITH INTERNAL FIXATION DEVICE, OPEN APPROACH: ICD-10-PCS | Performed by: ORTHOPAEDIC SURGERY

## 2024-03-23 PROCEDURE — 3700000000 HC ANESTHESIA ATTENDED CARE: Performed by: ORTHOPAEDIC SURGERY

## 2024-03-23 PROCEDURE — 99221 1ST HOSP IP/OBS SF/LOW 40: CPT | Performed by: ORTHOPAEDIC SURGERY

## 2024-03-23 PROCEDURE — 6360000002 HC RX W HCPCS: Performed by: ORTHOPAEDIC SURGERY

## 2024-03-23 PROCEDURE — 6360000002 HC RX W HCPCS: Performed by: STUDENT IN AN ORGANIZED HEALTH CARE EDUCATION/TRAINING PROGRAM

## 2024-03-23 PROCEDURE — 2580000003 HC RX 258: Performed by: INTERNAL MEDICINE

## 2024-03-23 PROCEDURE — 6370000000 HC RX 637 (ALT 250 FOR IP): Performed by: INTERNAL MEDICINE

## 2024-03-23 PROCEDURE — 80048 BASIC METABOLIC PNL TOTAL CA: CPT

## 2024-03-23 PROCEDURE — 2709999900 HC NON-CHARGEABLE SUPPLY: Performed by: ORTHOPAEDIC SURGERY

## 2024-03-23 PROCEDURE — 94761 N-INVAS EAR/PLS OXIMETRY MLT: CPT

## 2024-03-23 PROCEDURE — 2580000003 HC RX 258: Performed by: ORTHOPAEDIC SURGERY

## 2024-03-23 PROCEDURE — 27814 TREATMENT OF ANKLE FRACTURE: CPT | Performed by: ORTHOPAEDIC SURGERY

## 2024-03-23 PROCEDURE — 51798 US URINE CAPACITY MEASURE: CPT

## 2024-03-23 PROCEDURE — 2580000003 HC RX 258: Performed by: NURSE ANESTHETIST, CERTIFIED REGISTERED

## 2024-03-23 PROCEDURE — 2700000000 HC OXYGEN THERAPY PER DAY

## 2024-03-23 PROCEDURE — 3600000014 HC SURGERY LEVEL 4 ADDTL 15MIN: Performed by: ORTHOPAEDIC SURGERY

## 2024-03-23 PROCEDURE — 86850 RBC ANTIBODY SCREEN: CPT

## 2024-03-23 PROCEDURE — 3700000001 HC ADD 15 MINUTES (ANESTHESIA): Performed by: ORTHOPAEDIC SURGERY

## 2024-03-23 PROCEDURE — 51701 INSERT BLADDER CATHETER: CPT

## 2024-03-23 PROCEDURE — 6360000002 HC RX W HCPCS: Performed by: NURSE ANESTHETIST, CERTIFIED REGISTERED

## 2024-03-23 PROCEDURE — 94640 AIRWAY INHALATION TREATMENT: CPT

## 2024-03-23 PROCEDURE — 86901 BLOOD TYPING SEROLOGIC RH(D): CPT

## 2024-03-23 PROCEDURE — 3600000004 HC SURGERY LEVEL 4 BASE: Performed by: ORTHOPAEDIC SURGERY

## 2024-03-23 PROCEDURE — 64447 NJX AA&/STRD FEMORAL NRV IMG: CPT | Performed by: STUDENT IN AN ORGANIZED HEALTH CARE EDUCATION/TRAINING PROGRAM

## 2024-03-23 PROCEDURE — 36415 COLL VENOUS BLD VENIPUNCTURE: CPT

## 2024-03-23 PROCEDURE — C1769 GUIDE WIRE: HCPCS | Performed by: ORTHOPAEDIC SURGERY

## 2024-03-23 PROCEDURE — 85025 COMPLETE CBC W/AUTO DIFF WBC: CPT

## 2024-03-23 PROCEDURE — 7100000000 HC PACU RECOVERY - FIRST 15 MIN: Performed by: ORTHOPAEDIC SURGERY

## 2024-03-23 DEVICE — SCREW BNE L50MM DIA3.5MM CORT S STL ST FULL THRD LOK T15: Type: IMPLANTABLE DEVICE | Site: ANKLE | Status: FUNCTIONAL

## 2024-03-23 DEVICE — IMPLANTABLE DEVICE: Type: IMPLANTABLE DEVICE | Site: ANKLE | Status: FUNCTIONAL

## 2024-03-23 DEVICE — SCREW BNE L18MM DIA4MM CANC S STL ST CANN NONLOCKING FULL: Type: IMPLANTABLE DEVICE | Site: ANKLE | Status: FUNCTIONAL

## 2024-03-23 DEVICE — SCREW BNE L14MM DIA3.5MM CORT S STL ST FULL THRD LOK T15: Type: IMPLANTABLE DEVICE | Site: ANKLE | Status: FUNCTIONAL

## 2024-03-23 DEVICE — SCREW BNE L16MM DIA3.5MM CORT S STL ST LOK FULL THRD T15: Type: IMPLANTABLE DEVICE | Site: ANKLE | Status: FUNCTIONAL

## 2024-03-23 DEVICE — PLATE BNE L W10.1XL84MM THK3.5MM 6 H BILAT S STL STR RIG: Type: IMPLANTABLE DEVICE | Site: ANKLE | Status: FUNCTIONAL

## 2024-03-23 RX ORDER — IPRATROPIUM BROMIDE AND ALBUTEROL SULFATE 2.5; .5 MG/3ML; MG/3ML
1 SOLUTION RESPIRATORY (INHALATION)
Status: DISCONTINUED | OUTPATIENT
Start: 2024-03-23 | End: 2024-03-23 | Stop reason: HOSPADM

## 2024-03-23 RX ORDER — PROCHLORPERAZINE EDISYLATE 5 MG/ML
5 INJECTION INTRAMUSCULAR; INTRAVENOUS
Status: DISCONTINUED | OUTPATIENT
Start: 2024-03-23 | End: 2024-03-23 | Stop reason: HOSPADM

## 2024-03-23 RX ORDER — FENTANYL CITRATE 50 UG/ML
25 INJECTION, SOLUTION INTRAMUSCULAR; INTRAVENOUS EVERY 5 MIN PRN
Status: DISCONTINUED | OUTPATIENT
Start: 2024-03-23 | End: 2024-03-23 | Stop reason: HOSPADM

## 2024-03-23 RX ORDER — NALOXONE HYDROCHLORIDE 0.4 MG/ML
INJECTION, SOLUTION INTRAMUSCULAR; INTRAVENOUS; SUBCUTANEOUS PRN
Status: DISCONTINUED | OUTPATIENT
Start: 2024-03-23 | End: 2024-03-23 | Stop reason: HOSPADM

## 2024-03-23 RX ORDER — BUPIVACAINE HYDROCHLORIDE 5 MG/ML
INJECTION, SOLUTION EPIDURAL; INTRACAUDAL PRN
Status: DISCONTINUED | OUTPATIENT
Start: 2024-03-23 | End: 2024-03-23 | Stop reason: SDUPTHER

## 2024-03-23 RX ORDER — GLUCAGON 1 MG/ML
1 KIT INJECTION PRN
Status: DISCONTINUED | OUTPATIENT
Start: 2024-03-23 | End: 2024-03-23 | Stop reason: HOSPADM

## 2024-03-23 RX ORDER — DEXTROSE MONOHYDRATE 100 MG/ML
INJECTION, SOLUTION INTRAVENOUS CONTINUOUS PRN
Status: DISCONTINUED | OUTPATIENT
Start: 2024-03-23 | End: 2024-03-23 | Stop reason: HOSPADM

## 2024-03-23 RX ORDER — SODIUM CHLORIDE 0.9 % (FLUSH) 0.9 %
5-40 SYRINGE (ML) INJECTION EVERY 12 HOURS SCHEDULED
Status: DISCONTINUED | OUTPATIENT
Start: 2024-03-23 | End: 2024-03-23 | Stop reason: HOSPADM

## 2024-03-23 RX ORDER — HYDROMORPHONE HYDROCHLORIDE 1 MG/ML
0.5 INJECTION, SOLUTION INTRAMUSCULAR; INTRAVENOUS; SUBCUTANEOUS EVERY 5 MIN PRN
Status: DISCONTINUED | OUTPATIENT
Start: 2024-03-23 | End: 2024-03-23 | Stop reason: HOSPADM

## 2024-03-23 RX ORDER — ONDANSETRON 2 MG/ML
4 INJECTION INTRAMUSCULAR; INTRAVENOUS
Status: DISCONTINUED | OUTPATIENT
Start: 2024-03-23 | End: 2024-03-23 | Stop reason: HOSPADM

## 2024-03-23 RX ORDER — ENOXAPARIN SODIUM 100 MG/ML
40 INJECTION SUBCUTANEOUS DAILY
Status: DISCONTINUED | OUTPATIENT
Start: 2024-03-23 | End: 2024-03-26 | Stop reason: HOSPADM

## 2024-03-23 RX ORDER — ONDANSETRON 2 MG/ML
4 INJECTION INTRAMUSCULAR; INTRAVENOUS EVERY 6 HOURS PRN
Status: DISCONTINUED | OUTPATIENT
Start: 2024-03-23 | End: 2024-03-26 | Stop reason: HOSPADM

## 2024-03-23 RX ORDER — ONDANSETRON 2 MG/ML
INJECTION INTRAMUSCULAR; INTRAVENOUS PRN
Status: DISCONTINUED | OUTPATIENT
Start: 2024-03-23 | End: 2024-03-23 | Stop reason: SDUPTHER

## 2024-03-23 RX ORDER — SODIUM CHLORIDE 0.9 % (FLUSH) 0.9 %
5-40 SYRINGE (ML) INJECTION PRN
Status: DISCONTINUED | OUTPATIENT
Start: 2024-03-23 | End: 2024-03-23 | Stop reason: HOSPADM

## 2024-03-23 RX ORDER — SODIUM CHLORIDE, SODIUM LACTATE, POTASSIUM CHLORIDE, CALCIUM CHLORIDE 600; 310; 30; 20 MG/100ML; MG/100ML; MG/100ML; MG/100ML
INJECTION, SOLUTION INTRAVENOUS CONTINUOUS PRN
Status: DISCONTINUED | OUTPATIENT
Start: 2024-03-23 | End: 2024-03-23 | Stop reason: SDUPTHER

## 2024-03-23 RX ORDER — ONDANSETRON 4 MG/1
4 TABLET, ORALLY DISINTEGRATING ORAL EVERY 8 HOURS PRN
Status: DISCONTINUED | OUTPATIENT
Start: 2024-03-23 | End: 2024-03-26 | Stop reason: HOSPADM

## 2024-03-23 RX ORDER — CEFAZOLIN SODIUM/WATER 2 G/20 ML
SYRINGE (ML) INTRAVENOUS PRN
Status: DISCONTINUED | OUTPATIENT
Start: 2024-03-23 | End: 2024-03-23 | Stop reason: SDUPTHER

## 2024-03-23 RX ORDER — SODIUM CHLORIDE 9 MG/ML
INJECTION, SOLUTION INTRAVENOUS PRN
Status: DISCONTINUED | OUTPATIENT
Start: 2024-03-23 | End: 2024-03-26 | Stop reason: HOSPADM

## 2024-03-23 RX ORDER — SODIUM CHLORIDE 9 MG/ML
INJECTION, SOLUTION INTRAVENOUS CONTINUOUS
Status: DISCONTINUED | OUTPATIENT
Start: 2024-03-23 | End: 2024-03-23

## 2024-03-23 RX ORDER — POTASSIUM CHLORIDE 750 MG/1
40 TABLET, FILM COATED, EXTENDED RELEASE ORAL ONCE
Status: COMPLETED | OUTPATIENT
Start: 2024-03-23 | End: 2024-03-23

## 2024-03-23 RX ORDER — SODIUM CHLORIDE 0.9 % (FLUSH) 0.9 %
5-40 SYRINGE (ML) INJECTION EVERY 12 HOURS SCHEDULED
Status: DISCONTINUED | OUTPATIENT
Start: 2024-03-23 | End: 2024-03-26 | Stop reason: HOSPADM

## 2024-03-23 RX ORDER — SODIUM CHLORIDE 0.9 % (FLUSH) 0.9 %
5-40 SYRINGE (ML) INJECTION PRN
Status: DISCONTINUED | OUTPATIENT
Start: 2024-03-23 | End: 2024-03-26 | Stop reason: HOSPADM

## 2024-03-23 RX ORDER — PHENYLEPHRINE HCL IN 0.9% NACL 0.4MG/10ML
SYRINGE (ML) INTRAVENOUS PRN
Status: DISCONTINUED | OUTPATIENT
Start: 2024-03-23 | End: 2024-03-23 | Stop reason: SDUPTHER

## 2024-03-23 RX ORDER — PROPOFOL 10 MG/ML
INJECTION, EMULSION INTRAVENOUS PRN
Status: DISCONTINUED | OUTPATIENT
Start: 2024-03-23 | End: 2024-03-23 | Stop reason: SDUPTHER

## 2024-03-23 RX ORDER — SODIUM CHLORIDE 9 MG/ML
INJECTION, SOLUTION INTRAVENOUS PRN
Status: DISCONTINUED | OUTPATIENT
Start: 2024-03-23 | End: 2024-03-23 | Stop reason: HOSPADM

## 2024-03-23 RX ADMIN — Medication 3 AMPULE: at 08:00

## 2024-03-23 RX ADMIN — BUMETANIDE 2 MG: 1 TABLET ORAL at 10:59

## 2024-03-23 RX ADMIN — TRAMADOL HYDROCHLORIDE 50 MG: 50 TABLET ORAL at 21:42

## 2024-03-23 RX ADMIN — WATER 2000 MG: 1 INJECTION INTRAMUSCULAR; INTRAVENOUS; SUBCUTANEOUS at 16:37

## 2024-03-23 RX ADMIN — MEPIVACAINE HYDROCHLORIDE 10 ML: 20 INJECTION, SOLUTION EPIDURAL; INFILTRATION at 08:00

## 2024-03-23 RX ADMIN — BUPIVACAINE HYDROCHLORIDE 30 ML: 5 INJECTION, SOLUTION EPIDURAL; INTRACAUDAL; PERINEURAL at 07:50

## 2024-03-23 RX ADMIN — ASPIRIN 81 MG: 81 TABLET, CHEWABLE ORAL at 11:02

## 2024-03-23 RX ADMIN — PREGABALIN 300 MG: 150 CAPSULE ORAL at 10:59

## 2024-03-23 RX ADMIN — ENOXAPARIN SODIUM 40 MG: 100 INJECTION SUBCUTANEOUS at 16:37

## 2024-03-23 RX ADMIN — CLONAZEPAM 0.5 MG: 0.5 TABLET ORAL at 20:49

## 2024-03-23 RX ADMIN — ACETAMINOPHEN 650 MG: 325 TABLET ORAL at 07:06

## 2024-03-23 RX ADMIN — Medication 2000 MG: at 08:28

## 2024-03-23 RX ADMIN — Medication 80 MCG: at 09:13

## 2024-03-23 RX ADMIN — PROPOFOL 20 MG: 10 INJECTION, EMULSION INTRAVENOUS at 07:50

## 2024-03-23 RX ADMIN — PROPOFOL 40 MCG/KG/MIN: 10 INJECTION, EMULSION INTRAVENOUS at 08:25

## 2024-03-23 RX ADMIN — PROPOFOL 20 MG: 10 INJECTION, EMULSION INTRAVENOUS at 08:26

## 2024-03-23 RX ADMIN — Medication 80 MCG: at 09:41

## 2024-03-23 RX ADMIN — TRAMADOL HYDROCHLORIDE 50 MG: 50 TABLET ORAL at 07:06

## 2024-03-23 RX ADMIN — SODIUM CHLORIDE, PRESERVATIVE FREE 5 ML: 5 INJECTION INTRAVENOUS at 20:49

## 2024-03-23 RX ADMIN — SODIUM CHLORIDE, PRESERVATIVE FREE 10 ML: 5 INJECTION INTRAVENOUS at 08:00

## 2024-03-23 RX ADMIN — CARVEDILOL 25 MG: 12.5 TABLET, FILM COATED ORAL at 10:59

## 2024-03-23 RX ADMIN — POTASSIUM CHLORIDE 40 MEQ: 750 TABLET, EXTENDED RELEASE ORAL at 10:59

## 2024-03-23 RX ADMIN — Medication 80 MCG: at 09:39

## 2024-03-23 RX ADMIN — Medication: at 11:10

## 2024-03-23 RX ADMIN — BUPIVACAINE HYDROCHLORIDE 10 ML: 5 INJECTION, SOLUTION EPIDURAL; INTRACAUDAL; PERINEURAL at 07:55

## 2024-03-23 RX ADMIN — LEVOFLOXACIN 750 MG: 5 INJECTION, SOLUTION INTRAVENOUS at 16:38

## 2024-03-23 RX ADMIN — ONDANSETRON 4 MG: 2 INJECTION INTRAMUSCULAR; INTRAVENOUS at 09:13

## 2024-03-23 RX ADMIN — Medication: at 11:01

## 2024-03-23 RX ADMIN — CARVEDILOL 25 MG: 12.5 TABLET, FILM COATED ORAL at 16:37

## 2024-03-23 RX ADMIN — Medication: at 20:50

## 2024-03-23 RX ADMIN — Medication 80 MCG: at 09:19

## 2024-03-23 RX ADMIN — BUDESONIDE 500 MCG: 0.5 INHALANT RESPIRATORY (INHALATION) at 19:41

## 2024-03-23 RX ADMIN — Medication 80 MCG: at 09:29

## 2024-03-23 RX ADMIN — ACETAMINOPHEN 650 MG: 325 TABLET ORAL at 16:37

## 2024-03-23 RX ADMIN — ARFORMOTEROL TARTRATE 15 MCG: 15 SOLUTION RESPIRATORY (INHALATION) at 19:41

## 2024-03-23 RX ADMIN — ISOSORBIDE MONONITRATE 60 MG: 30 TABLET, EXTENDED RELEASE ORAL at 10:59

## 2024-03-23 RX ADMIN — SODIUM CHLORIDE, PRESERVATIVE FREE 10 ML: 5 INJECTION INTRAVENOUS at 11:00

## 2024-03-23 RX ADMIN — Medication 400 MG: at 10:59

## 2024-03-23 RX ADMIN — POTASSIUM CHLORIDE 20 MEQ: 1500 TABLET, EXTENDED RELEASE ORAL at 10:59

## 2024-03-23 RX ADMIN — Medication 1 AMPULE: at 20:50

## 2024-03-23 RX ADMIN — SODIUM CHLORIDE, PRESERVATIVE FREE 5 ML: 5 INJECTION INTRAVENOUS at 20:50

## 2024-03-23 RX ADMIN — Medication 400 MG: at 20:49

## 2024-03-23 RX ADMIN — SODIUM CHLORIDE, POTASSIUM CHLORIDE, SODIUM LACTATE AND CALCIUM CHLORIDE: 600; 310; 30; 20 INJECTION, SOLUTION INTRAVENOUS at 07:57

## 2024-03-23 ASSESSMENT — PAIN DESCRIPTION - ONSET
ONSET: SUDDEN
ONSET: ON-GOING

## 2024-03-23 ASSESSMENT — PAIN DESCRIPTION - DESCRIPTORS
DESCRIPTORS: ACHING
DESCRIPTORS: ACHING

## 2024-03-23 ASSESSMENT — PAIN SCALES - GENERAL
PAINLEVEL_OUTOF10: 3
PAINLEVEL_OUTOF10: 0
PAINLEVEL_OUTOF10: 6
PAINLEVEL_OUTOF10: 6
PAINLEVEL_OUTOF10: 0

## 2024-03-23 ASSESSMENT — PAIN DESCRIPTION - ORIENTATION
ORIENTATION: LEFT
ORIENTATION: LEFT;POSTERIOR
ORIENTATION: LEFT

## 2024-03-23 ASSESSMENT — PAIN DESCRIPTION - LOCATION
LOCATION: ANKLE;FOOT
LOCATION: ANKLE

## 2024-03-23 ASSESSMENT — PAIN - FUNCTIONAL ASSESSMENT
PAIN_FUNCTIONAL_ASSESSMENT: PREVENTS OR INTERFERES SOME ACTIVE ACTIVITIES AND ADLS
PAIN_FUNCTIONAL_ASSESSMENT: PREVENTS OR INTERFERES SOME ACTIVE ACTIVITIES AND ADLS

## 2024-03-23 ASSESSMENT — PAIN DESCRIPTION - FREQUENCY
FREQUENCY: INTERMITTENT
FREQUENCY: INTERMITTENT

## 2024-03-23 ASSESSMENT — PAIN DESCRIPTION - PAIN TYPE
TYPE: ACUTE PAIN
TYPE: ACUTE PAIN

## 2024-03-23 NOTE — OP NOTE
[facility]              [address] [place]  [address]                            OPERATIVE REPORT      PATIENT NAME: NEDRA WEBER              : 1950  MED REC NO: [id number]                       ROOM: OR  ACCOUNT NO: [id number]                       ADMIT DATE: 2024  PROVIDER: Edgardo Farnsworth DO    DATE OF SERVICE:  2024    PREOPERATIVE DIAGNOSES:  Bimalleolar left ankle fracture.    POSTOPERATIVE DIAGNOSES:  Bimalleolar left ankle fracture.    PROCEDURES PERFORMED:  ORIF, left ankle fracture.    SURGEON:  Edgardo Farnsworth DO    ASSISTANT:  [facility] staff.    ANESTHESIA:  General.    ESTIMATED BLOOD LOSS:  Minimal.    SPECIMENS REMOVED:  None.    INTRAOPERATIVE FINDINGS:  fx     COMPLICATIONS:  None.    IMPLANTS:  Synthes.    INDICATIONS:  The patient is a 73-year-old female, who presented to the hospital yesterday after a ground level fall.  She has neuropathy and edema in the lower extremities and this complicated a closed reduction of the left ankle in the ER.  She was placed in a splint.  We discussed treatment options.  Risks and benefits of surgical treatment were explained.  We discussed risks of infection, blood loss, neurovascular injury, anesthesia risks, and risks secondary to patient's comorbidities.  Once she was medically optimized and ready for surgical treatment, she was seen in the preoperative holding area.  History and physical forms and consent forms were confirmed under chart.  Her operative extremity was marked by Orthopedics.    DESCRIPTION OF PROCEDURE:  The patient was taken to the OR and transferred to the operating room table.  She was placed in supine position.  All prominences were carefully padded.  She was given the sedation and intubated by Anesthesia.  Sterile prepping and draping was performed.  After sterile prepping and draping, a time-out was called.  The patient was identified by name, date of birth, operative

## 2024-03-23 NOTE — ANESTHESIA PROCEDURE NOTES
Peripheral Block    Patient location during procedure: holding area  Reason for block: post-op pain management and at surgeon's request  Start time: 3/23/2024 7:40 AM  End time: 3/23/2024 8:00 AM  Staffing  Performed: anesthesiologist   Anesthesiologist: Edison Tinsley MD  Performed by: Edison Tinsley MD  Authorized by: Edison Tinsley MD    Preanesthetic Checklist  Completed: patient identified, IV checked, site marked, risks and benefits discussed, surgical/procedural consents, equipment checked, pre-op evaluation, timeout performed, anesthesia consent given, oxygen available, monitors applied/VS acknowledged, fire risk safety assessment completed and verbalized and blood product R/B/A discussed and consented  Peripheral Block   Patient position: supine  Prep: ChloraPrep  Provider prep: mask and sterile gloves  Patient monitoring: cardiac monitor, continuous pulse ox, continuous capnometry, frequent blood pressure checks, IV access, oxygen and responsive to questions  Block type: Saphenous  Laterality: left  Injection technique: single-shot  Guidance: ultrasound guided    Needle   Needle type: insulated echogenic nerve stimulator needle   Needle gauge: 20 G  Needle localization: ultrasound guidance  Needle length: 10 cm  Assessment   Injection assessment: negative aspiration for heme, no paresthesia on injection, local visualized surrounding nerve on ultrasound and no intravascular symptoms  Paresthesia pain: none  Slow fractionated injection: yes  Hemodynamics: stable  Outcomes: uncomplicated and patient tolerated procedure well    Additional Notes  With a left periarterial femoral block for tourniquet pain with 10cc 2% mepivacaine

## 2024-03-23 NOTE — ANESTHESIA POSTPROCEDURE EVALUATION
Department of Anesthesiology  Postprocedure Note    Patient: Marilee Oakes  MRN: [id number]  YOB: 1950  Date of evaluation: 3/23/2024    Procedure Summary       Date: 03/23/24 Room / Location: [facility] MAIN OR 7 / [facility] MAIN OR    Anesthesia Start: 0819 Anesthesia Stop: 0947    Procedure: LEFT ANKLE OPEN REDUCTION INTERNAL FIXATION (Left: Ankle) Diagnosis:       Closed fracture of left ankle, initial encounter      (Closed fracture of left ankle, initial encounter [S82.892A])    Providers: Edgardo Farnsworth DO Responsible Provider: Edison Tinsley MD    Anesthesia Type: Regional, MAC ASA Status: 3            Anesthesia Type: Regional, MAC    Amol Phase I: Amol Score: 4    Amol Phase II:      Anesthesia Post Evaluation    Patient location during evaluation: PACU  Patient participation: complete - patient participated  Level of consciousness: awake and alert  Airway patency: patent  Nausea & Vomiting: no nausea  Cardiovascular status: hemodynamically stable  Respiratory status: acceptable  Hydration status: euvolemic  Multimodal analgesia pain management approach  Pain management: adequate    No notable events documented.

## 2024-03-23 NOTE — ANESTHESIA PRE PROCEDURE
Weight:       Height:                                                  BP Readings from Last 3 Encounters:   03/23/24 (!) 140/78   01/27/24 (!) 163/84   12/29/23 (!) 153/91       NPO Status: Time of last liquid consumption: 2200                                                 Date of last liquid consumption: 03/22/24                             BMI:   Wt Readings from Last 3 Encounters:   03/22/24 102.5 kg (226 lb)   01/22/24 105.4 kg (232 lb 5.8 oz)   12/22/23 123.4 kg (272 lb 0.8 oz)     Body mass index is 33.37 kg/m².    CBC:   Lab Results   Component Value Date/Time    WBC 6.2 03/23/2024 04:59 AM    RBC 3.63 03/23/2024 04:59 AM    HGB 9.3 03/23/2024 04:59 AM    HCT 31.5 03/23/2024 04:59 AM    MCV 86.8 03/23/2024 04:59 AM    RDW 19.2 03/23/2024 04:59 AM     03/23/2024 04:59 AM       CMP:   Lab Results   Component Value Date/Time     03/23/2024 04:59 AM    K 3.2 03/23/2024 04:59 AM     03/23/2024 04:59 AM    CO2 33 03/23/2024 04:59 AM    BUN 16 03/23/2024 04:59 AM    CREATININE 1.17 03/23/2024 04:59 AM    GFRAA 35 07/22/2022 08:42 AM    AGRATIO 0.9 03/22/2024 09:55 AM    AGRATIO 1.1 07/22/2022 08:42 AM    LABGLOM 50 03/23/2024 04:59 AM    GLUCOSE 137 03/23/2024 04:59 AM    PROT 6.8 03/22/2024 09:55 AM    CALCIUM 9.0 03/23/2024 04:59 AM    BILITOT 1.8 03/22/2024 09:55 AM    ALKPHOS 123 03/22/2024 09:55 AM    ALKPHOS 165 07/22/2022 08:42 AM    AST 22 03/22/2024 09:55 AM    ALT 17 03/22/2024 09:55 AM       POC Tests:   Recent Labs     03/23/24  0641   POCGLU 155*       Coags:   Lab Results   Component Value Date/Time    APTT 84.3 05/24/2021 03:37 AM       HCG (If Applicable): No results found for: \"PREGTESTUR\", \"PREGSERUM\", \"HCG\", \"HCGQUANT\"     ABGs:   Lab Results   Component Value Date/Time    PHART 7.46 05/19/2021 02:36 PM    PO2ART 70 05/19/2021 02:36 PM    KCQ4ELI 34.6 05/19/2021 02:36 PM    MEK2KJE 28 12/18/2023 09:05 PM    BEART 1.0 05/19/2021 02:36 PM        Type & Screen (If

## 2024-03-23 NOTE — BRIEF OP NOTE
Brief Postoperative Note      Patient: Marilee Oakes  YOB: 1950  MRN: [id number]    Date of Procedure: 3/23/2024    Pre-Op Diagnosis Codes:     * Closed fracture of left ankle, initial encounter [S82.892A]    Post-Op Diagnosis: Same       Procedure(s):  LEFT ANKLE OPEN REDUCTION INTERNAL FIXATION    Surgeon(s):  Edgardo Farnsworth DO    Assistant:  Surgical Assistant: Rell Mcgowan    Anesthesia: Other    Estimated Blood Loss (mL): Minimal    Complications: None    Specimens:   * No specimens in log *    Implants:  Implant Name Type Inv. Item Serial No.  Lot No. LRB No. Used Action   PLATE BNE L [id number] THK3.5MM 6 H BILAT S STL STR RIG - SNA  PLATE BNE L [id number] THK3.5MM 6 H BILAT S STL STR RIG NA [employer]PU[employer]-WD NA Left 1 Implanted   SCREW BNE L16MM DIA3.5MM MANFRED S STL ST ELVIRA FULL THRD T15 - SNA  SCREW BNE L16MM DIA3.5MM MANFRED S STL ST ELVIRA FULL THRD T15 NA [employer]-WD NA Left 2 Implanted   SCREW BNE L18MM DIA4MM CANC S STL ST MICHELLE NONLOCKING FULL - SNA  SCREW BNE L18MM DIA4MM CANC S STL ST MICHELLE NONLOCKING FULL NA [employer]-WD NA Left 2 Implanted   SCREW BNE L14MM DIA3.5MM MANFRED S STL ST FULL THRD ELVIRA T15 - SNA  SCREW BNE L14MM DIA3.5MM MANFRED S STL ST FULL THRD ELVIRA T15 NA DEPU[employer]-WD NA Left 1 Implanted   SCREW BNE L50MM DIA3.5MM MANFRED S STL ST FULL THRD ELVIRA T15 - SNA  SCREW BNE L50MM DIA3.5MM MANFRED S STL ST FULL THRD ELVIRA T15 NA DEPUY [employer]-WD NA Left 1 Implanted   SCREW BNE MICHELLE SHT THRD 4X40 MM COMPR TI NS - SNA  SCREW BNE MICHELLE SHT THRD 4X40 MM COMPR TI NS NA DEPUY [employer]-WD NA Left 2 Implanted         Drains: * No LDAs found *    Findings: alexsandra fx      Electronically signed by Edgardo Farnsworth DO on 3/23/2024 at 9:21 AM

## 2024-03-23 NOTE — ANESTHESIA PROCEDURE NOTES

## 2024-03-23 NOTE — CONSULTS
ORTHOPAEDIC CONSULT NOTE    Subjective:     Date of Consultation:  March 23, 2024      Marilee Oakes is a 73 y.o. female who is being seen for left ankle deformity s/p fall. Pt reports injury occurred  yesterday.  Workup has revealed left ankle fx. Chronic neuropathy noted.  Minimal pain    Patient Active Problem List    Diagnosis Date Noted    Coronary artery disease of native heart with stable angina pectoris, unspecified vessel or lesion type ([facility])     Closed fracture of multiple ribs of right side with routine healing 08/31/2021    Ingrown toenail of left foot 12/17/2019    Lower extremity edema 12/17/2019    Venous stasis dermatitis of both lower extremities 12/17/2019    Obesity (BMI 30-39.9) 04/30/2019    Chronic cholecystitis 11/16/2018    Ischemic cardiomyopathy 11/13/2018    Stage 3a chronic kidney disease (HCC) 11/13/2018    Anxiety 01/22/2018    Long-term use of high-risk medication 01/22/2018    Hypercholesterolemia 01/22/2018    Cellulitis of left lower leg 06/06/2016    Bimalleolar fracture of left ankle, closed, initial encounter 03/22/2024    Heart failure (HCC) 01/22/2024    Endocarditis 12/26/2023    Nausea and vomiting 12/21/2023    MRSA bacteremia 12/21/2023    Ulcer of right foot with muscle involvement without evidence of necrosis ([facility]) 12/21/2023    Diabetic polyneuropathy associated with type 2 diabetes mellitus ([facility]) 12/21/2023    Poorly controlled diabetes mellitus ([facility]) 12/21/2023    Presence of combination internal cardiac defibrillator (ICD) and pacemaker 12/21/2023    Fever and chills 12/19/2023    Acute congestive heart failure, unspecified heart failure type ([facility]) 12/09/2023    Acute respiratory failure with hypoxia ([facility]) 12/08/2023    Intertriginous dermatitis associated with moisture 07/28/2023    Presence of Watchman left atrial appendage closure device 10/06/2021    Atrial fibrillation (HCC) 10/06/2021    Polypharmacy 05/19/2021    Open wound of left lower leg 03/31/2021    
Ortho:    Left ankle fracture  Pt w/o sig complaints of pain  Accompanied by       LLE with obvious deformity  Intact skin w/ chronic vascular changes noted--- cellulitis episode noted by pt/family 6ish mths ago   Intact achilles mech-- plantar spasm noted  Brisk cap refill--- less than 3 secs  Diminished sensation bilat LE, from from proximal 1/3 of shin to toes-- Baseline peripheral neuropathy                   Imaging reviewed:  EXAM: XR ANKLE LEFT (MIN 3 VIEWS)   INDICATION: trauma.   COMPARISON: None.   FINDINGS: Three views of the left ankle demonstrate a mildly displaced  bimalleolar ankle fracture with overlying soft tissue swelling. The ankle  mortise appears grossly intact..     IMPRESSION: Mildly displaced bimalleolar ankle fracture with overlying soft tissue swelling.      Procedure:   DIscussed nature of condition and treatment options with patient and .   No local anesthesia necessary   Proceeded with reduction using  manual manipulation.  I fitted a well padded, well molded posterior and stirrup splint for immobilization.    Three attempts were made to achieve an anatomic reduction, despite adjunctive medication, and aggressive hip/knee flexion spasm of the ankle into plantarflexion was persistent. Pt was completely comfortable w/o complaints during each attempt.  Patient tolerated the procedure well.  Neurovascular exam intact post procedure, Splint precautions reviewed.   Post reduction films confirm improved alignment       NPO after midnight  Hospitalist to admits, pre-op eval and optimization  NWB on the LLE  Aggressive elevation of the LLE    Plan for ORIF tomorrow morning with Dr Daja Vee PA-C      
lb Wt kg BMI kg/m2 BSA m2 O2 Sat%    2:07 /88               1:52 /92 81   5.0 9.00 175.26 233.00 105.687 34.41             PHYSICAL EXAM:  Exam Findings Details   Const Neg Level of Distress - Awake / Alert.  Nourishment - Well Nourished.   Neck Neg JVP - Less than 8 cm of H20.   Resp Neg Respirations - Nonlabored.  Breath Sounds - Clear Throughout.  Rales - Absent.  Wheezes - Absent.  Rhonchi - Absent.   Cardiac Neg Rhythm - Regular.  Palpation - PMI Normal.  Heart Sounds - S1 Normal, S2 Normal, No S3, No S4.   Cardiac Pos Murmurs - I/VI systolic ejection murmur.   Vasc Neg Carotid - No Bruits Noted.  Posterior Tibial - Bilateral Normal Pulse.   EXT Pos Lower Extremity Edema - Bilateral.   EXT Note   Trivial LE edema.   Psych Neg Orientation - Oriented to Time, Person, Place.   Completed Orders (This Visit)  Order Details Reason Side Interpretation Result Additional Info Initial Treatment Date Region   Patient will maintain and submit a BP diar                 IMPRESSION AND PLAN:  Heart failure symptoms plan of care documented - Yes  Hypertension plan of care documented - Yes  01. Atherosclerotic heart disease of native coronary artery with other forms of angina pectoris (I25.118):  No anginal symptoms. Dyspnea resolved with weight loss.    We discussed the signs and symptoms of unstable angina, myocardial infarction and malignant arrhythmia.  The patient knows to seek immediate medical attention should they occur.  ECG done   02. Dilated cardiomyopathy (I42.0):  Mixed ischemic/non-ischemic (tachycardia mediated) CM previously; EF 50% 1/2022: Stable.   03. Chronic combined systolic (congestive) and diastolic (congestive) heart failure (I50.42):  The patient is compliant with their CHF regimen.  is tolerating the current beta-blocker dose.   04. Hyp hrt & chr kdny dis w hrt fail and stg 1-4/unsp chr kdny (I13.0):  Previously well controlled, remains on Coreg 25 bid, hydralazine 75 tid, Imdur 30; also on

## 2024-03-24 LAB
ANION GAP SERPL CALC-SCNC: 4 MMOL/L (ref 5–15)
BASOPHILS # BLD: 0 K/UL (ref 0–0.1)
BASOPHILS NFR BLD: 0 % (ref 0–1)
BUN SERPL-MCNC: 21 MG/DL (ref 6–20)
BUN/CREAT SERPL: 14 (ref 12–20)
CALCIUM SERPL-MCNC: 8.7 MG/DL (ref 8.5–10.1)
CHLORIDE SERPL-SCNC: 106 MMOL/L (ref 97–108)
CO2 SERPL-SCNC: 31 MMOL/L (ref 21–32)
CREAT SERPL-MCNC: 1.45 MG/DL (ref 0.55–1.02)
DIFFERENTIAL METHOD BLD: ABNORMAL
EOSINOPHIL # BLD: 0.7 K/UL (ref 0–0.4)
EOSINOPHIL NFR BLD: 10 % (ref 0–7)
ERYTHROCYTE [DISTWIDTH] IN BLOOD BY AUTOMATED COUNT: 18.9 % (ref 11.5–14.5)
GLUCOSE BLD STRIP.AUTO-MCNC: 166 MG/DL (ref 65–117)
GLUCOSE BLD STRIP.AUTO-MCNC: 168 MG/DL (ref 65–117)
GLUCOSE BLD STRIP.AUTO-MCNC: 189 MG/DL (ref 65–117)
GLUCOSE BLD STRIP.AUTO-MCNC: 251 MG/DL (ref 65–117)
GLUCOSE SERPL-MCNC: 201 MG/DL (ref 65–100)
HCT VFR BLD AUTO: 31.4 % (ref 35–47)
HGB BLD-MCNC: 9.2 G/DL (ref 11.5–16)
IMM GRANULOCYTES # BLD AUTO: 0 K/UL (ref 0–0.04)
IMM GRANULOCYTES NFR BLD AUTO: 0 % (ref 0–0.5)
LYMPHOCYTES # BLD: 0.6 K/UL (ref 0.8–3.5)
LYMPHOCYTES NFR BLD: 9 % (ref 12–49)
MCH RBC QN AUTO: 26 PG (ref 26–34)
MCHC RBC AUTO-ENTMCNC: 29.3 G/DL (ref 30–36.5)
MCV RBC AUTO: 88.7 FL (ref 80–99)
MONOCYTES # BLD: 0.5 K/UL (ref 0–1)
MONOCYTES NFR BLD: 7 % (ref 5–13)
NEUTS SEG # BLD: 5.3 K/UL (ref 1.8–8)
NEUTS SEG NFR BLD: 75 % (ref 32–75)
NRBC # BLD: 0 K/UL (ref 0–0.01)
NRBC BLD-RTO: 0 PER 100 WBC
PLATELET # BLD AUTO: 101 K/UL (ref 150–400)
POTASSIUM SERPL-SCNC: 3.7 MMOL/L (ref 3.5–5.1)
RBC # BLD AUTO: 3.54 M/UL (ref 3.8–5.2)
SERVICE CMNT-IMP: ABNORMAL
SODIUM SERPL-SCNC: 141 MMOL/L (ref 136–145)
WBC # BLD AUTO: 7.1 K/UL (ref 3.6–11)

## 2024-03-24 PROCEDURE — 80048 BASIC METABOLIC PNL TOTAL CA: CPT

## 2024-03-24 PROCEDURE — 6370000000 HC RX 637 (ALT 250 FOR IP): Performed by: INTERNAL MEDICINE

## 2024-03-24 PROCEDURE — 85025 COMPLETE CBC W/AUTO DIFF WBC: CPT

## 2024-03-24 PROCEDURE — 2580000003 HC RX 258: Performed by: ORTHOPAEDIC SURGERY

## 2024-03-24 PROCEDURE — 97166 OT EVAL MOD COMPLEX 45 MIN: CPT

## 2024-03-24 PROCEDURE — 82962 GLUCOSE BLOOD TEST: CPT

## 2024-03-24 PROCEDURE — 94640 AIRWAY INHALATION TREATMENT: CPT

## 2024-03-24 PROCEDURE — 6360000002 HC RX W HCPCS: Performed by: ORTHOPAEDIC SURGERY

## 2024-03-24 PROCEDURE — 97535 SELF CARE MNGMENT TRAINING: CPT

## 2024-03-24 PROCEDURE — 94761 N-INVAS EAR/PLS OXIMETRY MLT: CPT

## 2024-03-24 PROCEDURE — 6370000000 HC RX 637 (ALT 250 FOR IP): Performed by: ORTHOPAEDIC SURGERY

## 2024-03-24 PROCEDURE — APPNB15 APP NON BILLABLE TIME 0-15 MINS: Performed by: ORTHOPAEDIC SURGERY

## 2024-03-24 PROCEDURE — 1100000000 HC RM PRIVATE

## 2024-03-24 PROCEDURE — 2700000000 HC OXYGEN THERAPY PER DAY

## 2024-03-24 PROCEDURE — 36415 COLL VENOUS BLD VENIPUNCTURE: CPT

## 2024-03-24 RX ADMIN — Medication: at 21:44

## 2024-03-24 RX ADMIN — Medication 1 AMPULE: at 21:53

## 2024-03-24 RX ADMIN — ENOXAPARIN SODIUM 40 MG: 100 INJECTION SUBCUTANEOUS at 14:40

## 2024-03-24 RX ADMIN — CARVEDILOL 25 MG: 12.5 TABLET, FILM COATED ORAL at 09:06

## 2024-03-24 RX ADMIN — Medication 1 AMPULE: at 09:14

## 2024-03-24 RX ADMIN — LEVOFLOXACIN 750 MG: 5 INJECTION, SOLUTION INTRAVENOUS at 14:46

## 2024-03-24 RX ADMIN — Medication 400 MG: at 09:06

## 2024-03-24 RX ADMIN — CARVEDILOL 25 MG: 12.5 TABLET, FILM COATED ORAL at 17:11

## 2024-03-24 RX ADMIN — SODIUM CHLORIDE, PRESERVATIVE FREE 10 ML: 5 INJECTION INTRAVENOUS at 21:44

## 2024-03-24 RX ADMIN — BUDESONIDE 500 MCG: 0.5 INHALANT RESPIRATORY (INHALATION) at 19:22

## 2024-03-24 RX ADMIN — CLONAZEPAM 0.5 MG: 0.5 TABLET ORAL at 21:43

## 2024-03-24 RX ADMIN — BUMETANIDE 2 MG: 1 TABLET ORAL at 09:07

## 2024-03-24 RX ADMIN — ARFORMOTEROL TARTRATE 15 MCG: 15 SOLUTION RESPIRATORY (INHALATION) at 19:22

## 2024-03-24 RX ADMIN — POTASSIUM CHLORIDE 20 MEQ: 1500 TABLET, EXTENDED RELEASE ORAL at 09:06

## 2024-03-24 RX ADMIN — INSULIN LISPRO 2 UNITS: 100 INJECTION, SOLUTION INTRAVENOUS; SUBCUTANEOUS at 12:26

## 2024-03-24 RX ADMIN — ASPIRIN 81 MG: 81 TABLET, CHEWABLE ORAL at 09:07

## 2024-03-24 RX ADMIN — ACETAMINOPHEN 650 MG: 325 TABLET ORAL at 00:37

## 2024-03-24 RX ADMIN — TRAMADOL HYDROCHLORIDE 50 MG: 50 TABLET ORAL at 09:11

## 2024-03-24 RX ADMIN — Medication 400 MG: at 21:43

## 2024-03-24 RX ADMIN — PANTOPRAZOLE SODIUM 40 MG: 40 TABLET, DELAYED RELEASE ORAL at 06:44

## 2024-03-24 RX ADMIN — Medication: at 09:13

## 2024-03-24 RX ADMIN — WATER 2000 MG: 1 INJECTION INTRAMUSCULAR; INTRAVENOUS; SUBCUTANEOUS at 00:38

## 2024-03-24 RX ADMIN — ISOSORBIDE MONONITRATE 60 MG: 30 TABLET, EXTENDED RELEASE ORAL at 09:07

## 2024-03-24 RX ADMIN — Medication: at 09:14

## 2024-03-24 RX ADMIN — SODIUM CHLORIDE, PRESERVATIVE FREE 10 ML: 5 INJECTION INTRAVENOUS at 09:09

## 2024-03-24 RX ADMIN — ONDANSETRON 4 MG: 4 TABLET, ORALLY DISINTEGRATING ORAL at 14:40

## 2024-03-24 RX ADMIN — TRAMADOL HYDROCHLORIDE 50 MG: 50 TABLET ORAL at 03:42

## 2024-03-24 RX ADMIN — PREGABALIN 300 MG: 150 CAPSULE ORAL at 09:07

## 2024-03-24 ASSESSMENT — PAIN DESCRIPTION - ONSET
ONSET: SUDDEN
ONSET: GRADUAL
ONSET: SUDDEN
ONSET: GRADUAL
ONSET: GRADUAL

## 2024-03-24 ASSESSMENT — PAIN DESCRIPTION - LOCATION
LOCATION: ANKLE;FOOT
LOCATION: ANKLE;FOOT
LOCATION: BACK
LOCATION: ANKLE
LOCATION: ANKLE

## 2024-03-24 ASSESSMENT — PAIN DESCRIPTION - FREQUENCY
FREQUENCY: INTERMITTENT

## 2024-03-24 ASSESSMENT — PAIN - FUNCTIONAL ASSESSMENT
PAIN_FUNCTIONAL_ASSESSMENT: ACTIVITIES ARE NOT PREVENTED

## 2024-03-24 ASSESSMENT — PAIN SCALES - GENERAL
PAINLEVEL_OUTOF10: 0
PAINLEVEL_OUTOF10: 4
PAINLEVEL_OUTOF10: 2
PAINLEVEL_OUTOF10: 3
PAINLEVEL_OUTOF10: 0
PAINLEVEL_OUTOF10: 0
PAINLEVEL_OUTOF10: 3

## 2024-03-24 ASSESSMENT — PAIN DESCRIPTION - ORIENTATION
ORIENTATION: LEFT
ORIENTATION: MID
ORIENTATION: LEFT

## 2024-03-24 ASSESSMENT — PAIN DESCRIPTION - PAIN TYPE
TYPE: ACUTE PAIN
TYPE: SURGICAL PAIN
TYPE: SURGICAL PAIN
TYPE: ACUTE PAIN
TYPE: SURGICAL PAIN

## 2024-03-24 ASSESSMENT — PAIN DESCRIPTION - DESCRIPTORS
DESCRIPTORS: ACHING
DESCRIPTORS: DISCOMFORT
DESCRIPTORS: DISCOMFORT
DESCRIPTORS: ACHING
DESCRIPTORS: DISCOMFORT

## 2024-03-24 NOTE — CARE COORDINATION
Care Management Initial Assessment     RUR: 23% - High  Readmission? No  1st IM letter given? Yes   1st  letter given: N/a    74 YO White Female admitted on 3/22/24 for closed fracture of left ankle after falling down trying to  her oxygen tubing from the floor. Last hospital admission 1/22/24-1/27/24. Lives in 1-story house (ramp entry) in [place], [place] with spouse. Supportive 2 daughters & their spouses live nearby. Reports at baseline, Nicolette w/ rollator for mobility, ADLs & most IADL's. Spouse helps her with transfers in/out of shower/tub. Spouse provides transportation. Reports activity can be limited by shortness of breath. Currently open with [facility]  PT/SN. Hx of SNF ([facility], [facility]). Denies hx of IPR. DME includes: 2LPM continuous O2 ([employer]), rollator, RW, CPAP, and manual w/c. Preferred Rx is [facility] or [facility] ([facility]) for short-term meds. Has Medicare A&B and Electra Supplement. [facility] is following for prescription management, but PCP just left practice. Requesting assistance with setting up new PCP @ [facility] (Elida Palacios NP). CM specialist notified.     **Please note pt is DDNR on file from 10/10/18 and needs to be updated**    CM met with pt & spouse at bedside to verify demographics, complete assessment. PT/OT consults placed, pending at this time. Per nursing report, needed MaxA x 2 for transfers last evening. CM discussed possibility of rehab recommendation for d/c pending evaluations. Spouse is very adamant against SNF placement, as they both report terrible past experiences at SNF. Education provided re: IPR, but would be dependent on recommendation from PT/OT and whether pt could tolerate 3-5 hours of therapy daily. Spouse is unsure she'll be able to tolerate that with her breathing issues but is open to the idea if they feel it is appropriate. Otherwise, would like to d/c home with [facility] for [facility]

## 2024-03-25 LAB
ANION GAP SERPL CALC-SCNC: 4 MMOL/L (ref 5–15)
BASOPHILS # BLD: 0 K/UL (ref 0–0.1)
BASOPHILS NFR BLD: 0 % (ref 0–1)
BUN SERPL-MCNC: 19 MG/DL (ref 6–20)
BUN/CREAT SERPL: 15 (ref 12–20)
CALCIUM SERPL-MCNC: 9.1 MG/DL (ref 8.5–10.1)
CHLORIDE SERPL-SCNC: 106 MMOL/L (ref 97–108)
CO2 SERPL-SCNC: 32 MMOL/L (ref 21–32)
CREAT SERPL-MCNC: 1.31 MG/DL (ref 0.55–1.02)
DIFFERENTIAL METHOD BLD: ABNORMAL
EOSINOPHIL # BLD: 0.7 K/UL (ref 0–0.4)
EOSINOPHIL NFR BLD: 12 % (ref 0–7)
ERYTHROCYTE [DISTWIDTH] IN BLOOD BY AUTOMATED COUNT: 18.7 % (ref 11.5–14.5)
GLUCOSE BLD STRIP.AUTO-MCNC: 132 MG/DL (ref 65–117)
GLUCOSE BLD STRIP.AUTO-MCNC: 152 MG/DL (ref 65–117)
GLUCOSE BLD STRIP.AUTO-MCNC: 214 MG/DL (ref 65–117)
GLUCOSE BLD STRIP.AUTO-MCNC: 282 MG/DL (ref 65–117)
GLUCOSE SERPL-MCNC: 153 MG/DL (ref 65–100)
HCT VFR BLD AUTO: 31.3 % (ref 35–47)
HGB BLD-MCNC: 9 G/DL (ref 11.5–16)
IMM GRANULOCYTES # BLD AUTO: 0 K/UL (ref 0–0.04)
IMM GRANULOCYTES NFR BLD AUTO: 0 % (ref 0–0.5)
LYMPHOCYTES # BLD: 0.8 K/UL (ref 0.8–3.5)
LYMPHOCYTES NFR BLD: 14 % (ref 12–49)
MCH RBC QN AUTO: 25.4 PG (ref 26–34)
MCHC RBC AUTO-ENTMCNC: 28.8 G/DL (ref 30–36.5)
MCV RBC AUTO: 88.4 FL (ref 80–99)
MONOCYTES # BLD: 0.4 K/UL (ref 0–1)
MONOCYTES NFR BLD: 6 % (ref 5–13)
NEUTS SEG # BLD: 3.9 K/UL (ref 1.8–8)
NEUTS SEG NFR BLD: 68 % (ref 32–75)
NRBC # BLD: 0 K/UL (ref 0–0.01)
NRBC BLD-RTO: 0 PER 100 WBC
PLATELET # BLD AUTO: 136 K/UL (ref 150–400)
PMV BLD AUTO: 11.6 FL (ref 8.9–12.9)
POTASSIUM SERPL-SCNC: 3.4 MMOL/L (ref 3.5–5.1)
RBC # BLD AUTO: 3.54 M/UL (ref 3.8–5.2)
SERVICE CMNT-IMP: ABNORMAL
SODIUM SERPL-SCNC: 142 MMOL/L (ref 136–145)
WBC # BLD AUTO: 5.7 K/UL (ref 3.6–11)

## 2024-03-25 PROCEDURE — 2700000000 HC OXYGEN THERAPY PER DAY

## 2024-03-25 PROCEDURE — 1100000000 HC RM PRIVATE

## 2024-03-25 PROCEDURE — 6370000000 HC RX 637 (ALT 250 FOR IP): Performed by: ORTHOPAEDIC SURGERY

## 2024-03-25 PROCEDURE — 94761 N-INVAS EAR/PLS OXIMETRY MLT: CPT

## 2024-03-25 PROCEDURE — 2580000003 HC RX 258: Performed by: ORTHOPAEDIC SURGERY

## 2024-03-25 PROCEDURE — 6370000000 HC RX 637 (ALT 250 FOR IP): Performed by: INTERNAL MEDICINE

## 2024-03-25 PROCEDURE — 85025 COMPLETE CBC W/AUTO DIFF WBC: CPT

## 2024-03-25 PROCEDURE — APPNB15 APP NON BILLABLE TIME 0-15 MINS: Performed by: ORTHOPAEDIC SURGERY

## 2024-03-25 PROCEDURE — 82962 GLUCOSE BLOOD TEST: CPT

## 2024-03-25 PROCEDURE — 36415 COLL VENOUS BLD VENIPUNCTURE: CPT

## 2024-03-25 PROCEDURE — 80048 BASIC METABOLIC PNL TOTAL CA: CPT

## 2024-03-25 PROCEDURE — 6360000002 HC RX W HCPCS: Performed by: ORTHOPAEDIC SURGERY

## 2024-03-25 PROCEDURE — 97161 PT EVAL LOW COMPLEX 20 MIN: CPT | Performed by: PHYSICAL THERAPIST

## 2024-03-25 PROCEDURE — 97530 THERAPEUTIC ACTIVITIES: CPT | Performed by: PHYSICAL THERAPIST

## 2024-03-25 PROCEDURE — 94640 AIRWAY INHALATION TREATMENT: CPT

## 2024-03-25 PROCEDURE — 97535 SELF CARE MNGMENT TRAINING: CPT

## 2024-03-25 RX ORDER — POTASSIUM CHLORIDE 750 MG/1
20 TABLET, FILM COATED, EXTENDED RELEASE ORAL ONCE
Status: COMPLETED | OUTPATIENT
Start: 2024-03-25 | End: 2024-03-25

## 2024-03-25 RX ADMIN — SODIUM CHLORIDE, PRESERVATIVE FREE 10 ML: 5 INJECTION INTRAVENOUS at 08:51

## 2024-03-25 RX ADMIN — Medication: at 08:48

## 2024-03-25 RX ADMIN — VENLAFAXINE HYDROCHLORIDE 150 MG: 150 CAPSULE, EXTENDED RELEASE ORAL at 08:50

## 2024-03-25 RX ADMIN — SODIUM CHLORIDE, PRESERVATIVE FREE 10 ML: 5 INJECTION INTRAVENOUS at 08:50

## 2024-03-25 RX ADMIN — INSULIN LISPRO 2 UNITS: 100 INJECTION, SOLUTION INTRAVENOUS; SUBCUTANEOUS at 11:40

## 2024-03-25 RX ADMIN — INSULIN LISPRO 1 UNITS: 100 INJECTION, SOLUTION INTRAVENOUS; SUBCUTANEOUS at 17:30

## 2024-03-25 RX ADMIN — ISOSORBIDE MONONITRATE 60 MG: 30 TABLET, EXTENDED RELEASE ORAL at 08:50

## 2024-03-25 RX ADMIN — Medication 400 MG: at 08:51

## 2024-03-25 RX ADMIN — Medication: at 20:51

## 2024-03-25 RX ADMIN — SODIUM CHLORIDE, PRESERVATIVE FREE 10 ML: 5 INJECTION INTRAVENOUS at 20:52

## 2024-03-25 RX ADMIN — CARVEDILOL 25 MG: 12.5 TABLET, FILM COATED ORAL at 08:50

## 2024-03-25 RX ADMIN — VENLAFAXINE HYDROCHLORIDE 150 MG: 150 CAPSULE, EXTENDED RELEASE ORAL at 17:30

## 2024-03-25 RX ADMIN — POTASSIUM CHLORIDE 20 MEQ: 1500 TABLET, EXTENDED RELEASE ORAL at 08:50

## 2024-03-25 RX ADMIN — PREGABALIN 300 MG: 150 CAPSULE ORAL at 08:51

## 2024-03-25 RX ADMIN — BUDESONIDE 500 MCG: 0.5 INHALANT RESPIRATORY (INHALATION) at 08:57

## 2024-03-25 RX ADMIN — ENOXAPARIN SODIUM 40 MG: 100 INJECTION SUBCUTANEOUS at 17:30

## 2024-03-25 RX ADMIN — Medication 1 AMPULE: at 08:51

## 2024-03-25 RX ADMIN — ARFORMOTEROL TARTRATE 15 MCG: 15 SOLUTION RESPIRATORY (INHALATION) at 08:57

## 2024-03-25 RX ADMIN — BUMETANIDE 2 MG: 1 TABLET ORAL at 08:50

## 2024-03-25 RX ADMIN — POLYETHYLENE GLYCOL 3350 17 G: 17 POWDER, FOR SOLUTION ORAL at 08:48

## 2024-03-25 RX ADMIN — CLONAZEPAM 0.5 MG: 0.5 TABLET ORAL at 20:50

## 2024-03-25 RX ADMIN — CARVEDILOL 25 MG: 12.5 TABLET, FILM COATED ORAL at 17:30

## 2024-03-25 RX ADMIN — Medication 400 MG: at 20:50

## 2024-03-25 RX ADMIN — POTASSIUM CHLORIDE 20 MEQ: 750 TABLET, FILM COATED, EXTENDED RELEASE ORAL at 10:31

## 2024-03-25 RX ADMIN — TRAMADOL HYDROCHLORIDE 50 MG: 50 TABLET ORAL at 21:56

## 2024-03-25 RX ADMIN — Medication 1 AMPULE: at 20:51

## 2024-03-25 RX ADMIN — ASPIRIN 81 MG: 81 TABLET, CHEWABLE ORAL at 08:50

## 2024-03-25 ASSESSMENT — PAIN SCALES - GENERAL
PAINLEVEL_OUTOF10: 6
PAINLEVEL_OUTOF10: 0

## 2024-03-25 NOTE — CARE COORDINATION
Transition of Care Plan:    RUR: 25% high risk  Prior Level of Functioning: some assistance needed  Disposition: HH vs IPR  If SNF or IPR: Date FOC offered: 3/25/24  Date FOC received: 3/25/24  Accepting facility:   Date authorization started with reference number:   Date authorization received and expires:   Follow up appointments: PCP and specialist  DME needed: n/a  Transportation at discharge: spouse vs BLS  IM/IMM Medicare/ letter given: will need 2nd IM letter  Is patient a  and connected with [facility]? no   If yes, was [place] transfer form completed and [facility] notified?   Caregiver Contact:  Wesley [phone]  Discharge Caregiver contacted prior to discharge? Yes, at bedside  Care Conference needed? no  Barriers to discharge: IPR placement    1245 - met with pt and  Wesley in room to discuss discharge plan. Pt and Wesley confirmed that SNF is not an option but they agree with recommendation for additional rehab prior to going home. Wesley reported that CM yesterday spoke with them and provided education re IPR, additional questions were presented to this CM and additional education provided. Pt and Wesley voiced preference for [facility] or [facility], agreeable to CM sending referrals to both facilities. CM reiterated that eligibility determination would be made by each facility based on therapy notes and other clinicals, CM will update when more information is known about placement vs home with home health.    Corie Espino, [facility]  Care Management  [phone]

## 2024-03-26 VITALS
HEIGHT: 69 IN | DIASTOLIC BLOOD PRESSURE: 64 MMHG | WEIGHT: 226 LBS | RESPIRATION RATE: 18 BRPM | BODY MASS INDEX: 33.47 KG/M2 | SYSTOLIC BLOOD PRESSURE: 122 MMHG | OXYGEN SATURATION: 100 % | HEART RATE: 80 BPM | TEMPERATURE: 97.7 F

## 2024-03-26 LAB
GLUCOSE BLD STRIP.AUTO-MCNC: 124 MG/DL (ref 65–117)
GLUCOSE BLD STRIP.AUTO-MCNC: 270 MG/DL (ref 65–117)
SERVICE CMNT-IMP: ABNORMAL
SERVICE CMNT-IMP: ABNORMAL

## 2024-03-26 PROCEDURE — 94761 N-INVAS EAR/PLS OXIMETRY MLT: CPT

## 2024-03-26 PROCEDURE — 94640 AIRWAY INHALATION TREATMENT: CPT

## 2024-03-26 PROCEDURE — 2700000000 HC OXYGEN THERAPY PER DAY

## 2024-03-26 PROCEDURE — 6370000000 HC RX 637 (ALT 250 FOR IP): Performed by: INTERNAL MEDICINE

## 2024-03-26 PROCEDURE — 6370000000 HC RX 637 (ALT 250 FOR IP): Performed by: ORTHOPAEDIC SURGERY

## 2024-03-26 PROCEDURE — 94760 N-INVAS EAR/PLS OXIMETRY 1: CPT

## 2024-03-26 PROCEDURE — 2580000003 HC RX 258: Performed by: ORTHOPAEDIC SURGERY

## 2024-03-26 PROCEDURE — 82962 GLUCOSE BLOOD TEST: CPT

## 2024-03-26 PROCEDURE — 6360000002 HC RX W HCPCS: Performed by: ORTHOPAEDIC SURGERY

## 2024-03-26 RX ORDER — VENLAFAXINE HYDROCHLORIDE 150 MG/1
150 CAPSULE, EXTENDED RELEASE ORAL 2 TIMES DAILY WITH MEALS
Status: DISCONTINUED | OUTPATIENT
Start: 2024-03-26 | End: 2024-03-26 | Stop reason: HOSPADM

## 2024-03-26 RX ORDER — TRAMADOL HYDROCHLORIDE 50 MG/1
50 TABLET ORAL EVERY 6 HOURS PRN
Qty: 15 TABLET | Refills: 0 | Status: SHIPPED | OUTPATIENT
Start: 2024-03-26 | End: 2024-03-29

## 2024-03-26 RX ORDER — POLYETHYLENE GLYCOL 3350 17 G/17G
17 POWDER, FOR SOLUTION ORAL DAILY PRN
Qty: 527 G | Refills: 0 | Status: SHIPPED | OUTPATIENT
Start: 2024-03-26 | End: 2024-04-25

## 2024-03-26 RX ORDER — BUDESONIDE AND FORMOTEROL FUMARATE DIHYDRATE 160; 4.5 UG/1; UG/1
2 AEROSOL RESPIRATORY (INHALATION) 2 TIMES DAILY
Qty: 30.6 G | Refills: 1 | Status: SHIPPED | OUTPATIENT
Start: 2024-03-26

## 2024-03-26 RX ORDER — TRAMADOL HYDROCHLORIDE 50 MG/1
50 TABLET ORAL EVERY 6 HOURS PRN
Qty: 15 TABLET | Refills: 0 | Status: SHIPPED | OUTPATIENT
Start: 2024-03-26 | End: 2024-03-26

## 2024-03-26 RX ORDER — NALOXONE HYDROCHLORIDE 4 MG/.1ML
1 SPRAY NASAL PRN
Qty: 1 EACH | Refills: 1 | Status: SHIPPED | OUTPATIENT
Start: 2024-03-26

## 2024-03-26 RX ORDER — CLONAZEPAM 0.5 MG/1
0.5 TABLET ORAL NIGHTLY
Qty: 15 TABLET | Refills: 0 | Status: SHIPPED | OUTPATIENT
Start: 2024-03-26 | End: 2024-04-10

## 2024-03-26 RX ADMIN — VENLAFAXINE HYDROCHLORIDE 150 MG: 150 CAPSULE, EXTENDED RELEASE ORAL at 12:04

## 2024-03-26 RX ADMIN — Medication 1 AMPULE: at 09:16

## 2024-03-26 RX ADMIN — Medication: at 09:10

## 2024-03-26 RX ADMIN — POTASSIUM CHLORIDE 20 MEQ: 1500 TABLET, EXTENDED RELEASE ORAL at 09:08

## 2024-03-26 RX ADMIN — INSULIN LISPRO 2 UNITS: 100 INJECTION, SOLUTION INTRAVENOUS; SUBCUTANEOUS at 12:04

## 2024-03-26 RX ADMIN — Medication 400 MG: at 09:08

## 2024-03-26 RX ADMIN — PANTOPRAZOLE SODIUM 40 MG: 40 TABLET, DELAYED RELEASE ORAL at 05:24

## 2024-03-26 RX ADMIN — Medication: at 09:12

## 2024-03-26 RX ADMIN — PREGABALIN 300 MG: 150 CAPSULE ORAL at 09:14

## 2024-03-26 RX ADMIN — BUMETANIDE 2 MG: 1 TABLET ORAL at 09:08

## 2024-03-26 RX ADMIN — SODIUM CHLORIDE, PRESERVATIVE FREE 10 ML: 5 INJECTION INTRAVENOUS at 09:12

## 2024-03-26 RX ADMIN — ISOSORBIDE MONONITRATE 60 MG: 30 TABLET, EXTENDED RELEASE ORAL at 09:08

## 2024-03-26 RX ADMIN — ASPIRIN 81 MG: 81 TABLET, CHEWABLE ORAL at 09:08

## 2024-03-26 RX ADMIN — CARVEDILOL 25 MG: 12.5 TABLET, FILM COATED ORAL at 09:08

## 2024-03-26 RX ADMIN — BUDESONIDE 500 MCG: 0.5 INHALANT RESPIRATORY (INHALATION) at 08:03

## 2024-03-26 RX ADMIN — ARFORMOTEROL TARTRATE 15 MCG: 15 SOLUTION RESPIRATORY (INHALATION) at 08:03

## 2024-03-26 NOTE — PLAN OF CARE
Problem: Occupational Therapy - Adult  Goal: By Discharge: Performs self-care activities at highest level of function for planned discharge setting.  See evaluation for individualized goals.  Description: FUNCTIONAL STATUS PRIOR TO ADMISSION:  Modified Independent with ADLs and functional mobility with rollator, with exception of supervision for shower transfer. Pt assisted with light household tasks, including washing dished. Pt admitted January 2024 and discharged home with spouse support.    , ADL Assistance: Independent (supervision for shower transfer, otherwise (I))    HOME SUPPORT: Patient lived spouse who is home and able to assist.    Occupational Therapy Goals:  Initiated 3/24/2024  1.  Patient will perform supine<>sit with Minimal Assist within 7 day(s).  2.  Patient will perform sit<>stand with Moderate Assist within 7 day(s).  3.  Patient will perform upper body dressing with Contact Guard Assist within 7 day(s).  4.  Patient will perform toilet transfers with Moderate Assist  within 7 day(s).  5.  Patient will demonstrate fair sitting balance in preparation for functional transfers and ADLs within 7 day(s).  6.  Patient will adhere to NWB LLE during functional activities with min verbal cues within 7 day(s).        Outcome: Progressing     OCCUPATIONAL THERAPY TREATMENT  Patient: Marilee Oakes (73 y.o. female)  Date: 3/25/2024  Primary Diagnosis: Closed fracture of left ankle, initial encounter [S82.892A]  Bimalleolar fracture of left ankle, closed, initial encounter [S82.842A]  Procedure(s) (LRB):  LEFT ANKLE OPEN REDUCTION INTERNAL FIXATION (Left) 2 Days Post-Op   Precautions: Fall Risk, Weight Bearing   Left Lower Extremity Weight Bearing: Non Weight Bearing            Chart, occupational therapy assessment, plan of care, and goals were reviewed.    ASSESSMENT  Patient continues to benefit from skilled OT services and is slowly progressing towards goals. Pt demonstrated improved sitting balance 
  Problem: Physical Therapy - Adult  Goal: By Discharge: Performs mobility at highest level of function for planned discharge setting.  See evaluation for individualized goals.  Description: FUNCTIONAL STATUS PRIOR TO ADMISSION: Patient was modified independent using a rollator for functional mobility.  Had been receiving  PT intermittently.    HOME SUPPORT PRIOR TO ADMISSION: The patient lived with  but did not require assistance.    Physical Therapy Goals  Initiated 3/25/2024  1.  Patient will move from supine to sit and sit to supine in bed with supervision/set-up within 7 day(s).    2.  Patient will perform sit to stand with moderate assistance within 7 day(s).  3.  Patient will transfer from bed to chair and chair to bed with moderate assistance using the least restrictive device within 7 day(s).  4.  Patient will ambulate with maximal assistance for 5 feet with the least restrictive device within 7 day(s).     Outcome: Progressing   PHYSICAL THERAPY EVALUATION    Patient: Marilee Oakes (73 y.o. female)  Date: 3/25/2024  Primary Diagnosis: Closed fracture of left ankle, initial encounter [S82.892A]  Bimalleolar fracture of left ankle, closed, initial encounter [S82.842A]  Procedure(s) (LRB):  LEFT ANKLE OPEN REDUCTION INTERNAL FIXATION (Left) 2 Days Post-Op   Precautions: Restrictions/Precautions: Fall Risk, Weight Bearing   Lower Extremity Weight Bearing Restrictions  Left Lower Extremity Weight Bearing: Non Weight Bearing                  ASSESSMENT :   DEFICITS/IMPAIRMENTS:   The patient is limited by impaired balance, gait instability, generalized weakness, NWB status and decreased activity tolerance.  Currently needing Martin for roll and come to the EOB.  Able to scoot herself to the EOB with increased time.  Good sitting balance and needs cues to scoot to the EOB.  Sit to stand with maxA x 2 with RW and is unable to maintain NWB status.  Patient is highly motivated and was independent at home 
  Problem: Respiratory - Adult  Goal: Achieves optimal ventilation and oxygenation  3/22/2024 2318 by Jyotsna Yang, RN  Outcome: Progressing  Note; pts oxygenation monitored with baseline 2L NC applied  3/22/2024 1925 by Danni Webber, RT  Outcome: Progressing     Problem: Skin/Tissue Integrity  Goal: Absence of new skin breakdown  Description: 1.  Monitor for areas of redness and/or skin breakdown  2.  Assess vascular access sites hourly  3.  Every 4-6 hours minimum:  Change oxygen saturation probe site  4.  Every 4-6 hours:  If on nasal continuous positive airway pressure, respiratory therapy assess nares and determine need for appliance change or resting period.  Outcome: Progressing  Note; pt with no evidence of new skin breakdown     Problem: Safety - Adult  Goal: Free from fall injury  Outcome: Progressing  Note; pt remains free from fall injury     Problem: Chronic Conditions and Co-morbidities  Goal: Patient's chronic conditions and co-morbidity symptoms are monitored and maintained or improved  Outcome: Progressing     Problem: Pain  Goal: Verbalizes/displays adequate comfort level or baseline comfort level  Outcome: Progressing  Note; pt reports prn tramadol managing pain level     
  Problem: Respiratory - Adult  Goal: Achieves optimal ventilation and oxygenation  3/23/2024 0907 by Francine Cantu RN  Outcome: Progressing  3/22/2024 2318 by Jyotsna Yang RN  Outcome: Progressing  3/22/2024 1925 by Danni Webber, RT  Outcome: Progressing     Problem: Skin/Tissue Integrity  Goal: Absence of new skin breakdown  Description: 1.  Monitor for areas of redness and/or skin breakdown  2.  Assess vascular access sites hourly  3.  Every 4-6 hours minimum:  Change oxygen saturation probe site  4.  Every 4-6 hours:  If on nasal continuous positive airway pressure, respiratory therapy assess nares and determine need for appliance change or resting period.  3/23/2024 0907 by Francine Cantu RN  Outcome: Progressing  3/22/2024 2318 by Jyotsna Yang RN  Outcome: Progressing     Problem: Safety - Adult  Goal: Free from fall injury  3/23/2024 0907 by Francine Cantu RN  Outcome: Progressing  3/22/2024 2318 by Jyotsna Yang RN  Outcome: Progressing     Problem: Chronic Conditions and Co-morbidities  Goal: Patient's chronic conditions and co-morbidity symptoms are monitored and maintained or improved  3/23/2024 0907 by Francine Cantu RN  Outcome: Progressing  3/22/2024 2318 by Jyotsna Yang RN  Outcome: Progressing     Problem: Pain  Goal: Verbalizes/displays adequate comfort level or baseline comfort level  3/23/2024 0907 by Francine Cantu RN  Outcome: Progressing  3/22/2024 2318 by Jyotsna Yang RN  Outcome: Progressing     Problem: ABCDS Injury Assessment  Goal: Absence of physical injury  Outcome: Progressing     
  Problem: Respiratory - Adult  Goal: Achieves optimal ventilation and oxygenation  3/24/2024 0116 by Jyotsna Yang, RN  Outcome: Progressing  3/23/2024 1945 by Danni Webber, RT  Outcome: Progressing     Problem: Skin/Tissue Integrity  Goal: Absence of new skin breakdown  Description: 1.  Monitor for areas of redness and/or skin breakdown  2.  Assess vascular access sites hourly  3.  Every 4-6 hours minimum:  Change oxygen saturation probe site  4.  Every 4-6 hours:  If on nasal continuous positive airway pressure, respiratory therapy assess nares and determine need for appliance change or resting period.  Outcome: Progressing     Problem: Safety - Adult  Goal: Free from fall injury  Outcome: Progressing     Problem: Chronic Conditions and Co-morbidities  Goal: Patient's chronic conditions and co-morbidity symptoms are monitored and maintained or improved  Outcome: Progressing     Problem: Pain  Goal: Verbalizes/displays adequate comfort level or baseline comfort level  Outcome: Progressing     Problem: ABCDS Injury Assessment  Goal: Absence of physical injury  Outcome: Progressing     
  Problem: Respiratory - Adult  Goal: Achieves optimal ventilation and oxygenation  3/24/2024 2103 by Yared Odom, RN  Outcome: Progressing  Flowsheets (Taken 3/24/2024 2103)  Achieves optimal ventilation and oxygenation:   Assess for changes in respiratory status   Position to facilitate oxygenation and minimize respiratory effort   Assess for changes in mentation and behavior   Oxygen supplementation based on oxygen saturation or arterial blood gases   Encourage broncho-pulmonary hygiene including cough, deep breathe, incentive spirometry   Assess and instruct to report shortness of breath or any respiratory difficulty  3/24/2024 1925 by Danni Webber, RT  Outcome: Progressing     Problem: Skin/Tissue Integrity  Goal: Absence of new skin breakdown  Description: 1.  Monitor for areas of redness and/or skin breakdown  2.  Assess vascular access sites hourly  3.  Every 4-6 hours minimum:  Change oxygen saturation probe site  4.  Every 4-6 hours:  If on nasal continuous positive airway pressure, respiratory therapy assess nares and determine need for appliance change or resting period.  Outcome: Progressing     Problem: Safety - Adult  Goal: Free from fall injury  Outcome: Progressing  Flowsheets (Taken 3/24/2024 2103)  Free From Fall Injury: Instruct family/caregiver on patient safety     Problem: Chronic Conditions and Co-morbidities  Goal: Patient's chronic conditions and co-morbidity symptoms are monitored and maintained or improved  Outcome: Progressing  Flowsheets (Taken 3/24/2024 2103)  Care Plan - Patient's Chronic Conditions and Co-Morbidity Symptoms are Monitored and Maintained or Improved: Monitor and assess patient's chronic conditions and comorbid symptoms for stability, deterioration, or improvement     Problem: Pain  Goal: Verbalizes/displays adequate comfort level or baseline comfort level  Outcome: Progressing  Flowsheets (Taken 3/24/2024 2103)  Verbalizes/displays adequate comfort level or 
  Problem: Respiratory - Adult  Goal: Achieves optimal ventilation and oxygenation  3/26/2024 1245 by Veronika Tomlinson RN  Outcome: Completed  3/26/2024 0824 by Naheed Kelley, RT  Outcome: Progressing     Problem: Skin/Tissue Integrity  Goal: Absence of new skin breakdown  Description: 1.  Monitor for areas of redness and/or skin breakdown  2.  Assess vascular access sites hourly  3.  Every 4-6 hours minimum:  Change oxygen saturation probe site  4.  Every 4-6 hours:  If on nasal continuous positive airway pressure, respiratory therapy assess nares and determine need for appliance change or resting period.  Outcome: Completed     Problem: Safety - Adult  Goal: Free from fall injury  Outcome: Completed     Problem: Chronic Conditions and Co-morbidities  Goal: Patient's chronic conditions and co-morbidity symptoms are monitored and maintained or improved  Outcome: Completed     Problem: Pain  Goal: Verbalizes/displays adequate comfort level or baseline comfort level  Outcome: Completed     Problem: ABCDS Injury Assessment  Goal: Absence of physical injury  Outcome: Completed     Problem: Neurosensory - Adult  Goal: Achieves maximal functionality and self care  Outcome: Completed     Problem: Cardiovascular - Adult  Goal: Maintains optimal cardiac output and hemodynamic stability  Outcome: Completed     Problem: Skin/Tissue Integrity - Adult  Goal: Skin integrity remains intact  Outcome: Completed  Goal: Incisions, wounds, or drain sites healing without S/S of infection  Outcome: Completed     Problem: Musculoskeletal - Adult  Goal: Maintain proper alignment of affected body part  Outcome: Completed     Problem: Gastrointestinal - Adult  Goal: Maintains or returns to baseline bowel function  Outcome: Completed  Goal: Maintains adequate nutritional intake  Outcome: Completed     Problem: Genitourinary - Adult  Goal: Absence of urinary retention  Outcome: Completed     Problem: Discharge Planning  Goal: Discharge to 
Adult  Goal: Achieves maximal functionality and self care  Outcome: Progressing     Problem: Cardiovascular - Adult  Goal: Maintains optimal cardiac output and hemodynamic stability  Outcome: Progressing     Problem: Skin/Tissue Integrity - Adult  Goal: Skin integrity remains intact  Outcome: Progressing  Flowsheets (Taken 3/25/2024 0831)  Skin Integrity Remains Intact: Monitor for areas of redness and/or skin breakdown  Goal: Incisions, wounds, or drain sites healing without S/S of infection  Outcome: Progressing     Problem: Musculoskeletal - Adult  Goal: Maintain proper alignment of affected body part  Outcome: Progressing     Problem: Gastrointestinal - Adult  Goal: Maintains or returns to baseline bowel function  Outcome: Progressing  Goal: Maintains adequate nutritional intake  Outcome: Progressing     Problem: Genitourinary - Adult  Goal: Absence of urinary retention  Outcome: Progressing     
cues;Tactile cues  Rolling: Moderate assistance  Supine to Sit: Moderate assistance;Additional time (HOB elevated, mod verbal cues for sequencing)  Sit to Supine: Maximum assistance  Scooting: Moderate assistance    Transfers:      Transfer Training  Transfer Training: Yes  Sit to Stand: Maximum assistance (unable to clear buttocks from EOB)                     Balance:   Standing: Impaired  Balance  Sitting: Impaired  Sitting - Static: Fair (occasional) (R lateral leaning, with eventual anterior leaning noted at end of session)  Sitting - Dynamic: Fair (occasional)  Standing: Impaired  Standing - Static: Poor (unable to achieve full standing)      ADL Assessment:          Feeding: Independent       Grooming: Setup  Grooming Skilled Clinical Factors: seated    UE Bathing: Minimal assistance       Skin Care: Chlorhexidine wipes;Foam skin cleanser;Incontinent cleanser    LE Bathing: Maximum assistance       UE Dressing: Minimal assistance       LE Dressing: Dependent/Total       Toileting: Dependent/Total                         ADL Intervention and task modifications:    Patient instructed and verbalized understanding regarding of benefits of maintaining activity tolerance, functional mobility, and independence with self care tasks during acute stay  to ensure safe return home and to baseline.     Pt educated on NWB LLE status 2/2 fx and ORIF. Pt demonstrated understanding of precautions, but was not able maintain when attempted to stand.             [facility] AM-PACTM \"6 Clicks\"                                                       Daily Activity Inpatient Short Form  How much help from another person does the patient currently need... Total; A Lot A Little None   1.  Putting on and taking off regular lower body clothing? [x]  1 []  2 []  3 []  4   2.  Bathing (including washing, rinsing, drying)? []  1 [x]  2 []  3 []  4   3.  Toileting, which includes using toilet, bedpan or urinal? [x] 1 []  2 []  3 []  4

## 2024-03-26 NOTE — DISCHARGE SUMMARY
STOP taking these medications      amLODIPine 10 MG tablet  Commonly known as: NORVASC     arformoterol tartrate 15 MCG/2ML Nebu  Commonly known as: BROVANA     budesonide 0.5 MG/2ML nebulizer suspension  Commonly known as: PULMICORT            ASK your doctor about these medications      venlafaxine 150 MG extended release capsule  Commonly known as: EFFEXOR XR               Where to Get Your Medications        These medications were sent to [facility] [address] [place] [address] [address] [facility] - P [phone] - F [phone] [address] [facility] [address]      Phone: [phone]   budesonide-formoterol 160-4.5 MCG/ACT Aero  naloxone 4 MG/0.1ML Liqd nasal spray  polyethylene glycol 17 g packet  traMADol 50 MG tablet       Information about where to get these medications is not yet available    Ask your nurse or doctor about these medications  clonazePAM 0.5 MG tablet             DISPOSITION:    Home with Family:       Home with HH/PT/OT/RN:    SNF/LTC: y   VALERIE:    OTHER:            Code status: Full    1. Recommended diet: Regular diabetic     2. Recommended activity: NWB LLE in boot     3. If you experience any of the following symptoms then please call your primary care physician or return to the emergency room if you cannot get hold of your doctor:    4. Wound Care: per ortho recommendations as discussed    5. Lab work: cbc and bmp in 1 week     6.Bring these papers with you to your follow up appointments. The papers will help your doctors be sure to continue the care plan from the hospital.        Follow up with:   PCP : Monalisa Thorpe LPN Jones, Jasmine P, DO  [address] [facility] [address] [phone]    Go on 6/21/2024  at 10:00 a.m. for your NEW PCP hospital follow up.    [employer] [address]   [address] [place] [address] [phone]  Call  if you need assistance prior to your new PCP appt.    Monalisa Thorpe LPN    Schedule an appointment as soon as

## 2024-03-26 NOTE — DISCHARGE INSTRUCTIONS
Patient or Representative Signature                                                          Date/Time

## 2024-03-26 NOTE — CARE COORDINATION
[facility]  [facility] - [facility]. Bed # to be assigned on arrival, call report [phone] and accepting physician Dr. Torres Morejon. [facility] @ 1330. Clear from CM.     Transition of Care Plan:    RUR: 26% (high RUR)  Prior Level of Functioning: Needing some assistance  Disposition: [facility] - [facility]  If SNF or IPR: Date FOC offered: 03/25/24  Date FOC received: 03/25/24  Accepting facility: [facility]   Date authorization started with reference number:   Date authorization received and expires:   Follow up appointments: defer to [facility]PR   DME needed: defer to IPR  Transportation at discharge: [facility] @ 1330  IM/IMM Medicare/ letter given: 2nd given  Is patient a [facility] and connected with [facility]? N/a   If yes, was [facility] transfer form completed and [facility] notified? N/a   Caregiver Contact: Wesley Oakes; spouse; [phone]  Discharge Caregiver contacted prior to discharge? Pt to contact  Care Conference needed? Not at this time  Barriers to discharge: None    0823 - Assumed transitions of care planning from MELVIN Espino. Chart review indicates that plan was SNF but family declined, CM placed referrals to I[facility]. [facility] has accepted. Unit CM working to confirm bed availability for possible d/c today if still medically clear. CM will clarify transport BLS vs spouse with family.     0849 - [facility] has bed available today, CM will set-up for d/c this afternoon with BLS. Pending info for [facility].     0941 - MD placed d/c order, reported pt is clear for d/c. Call to report [phone], accepting Dr. Torres Morejon, room number to be assigned on arrival. Message sent to MD with info for [facility]. Pt is clear for d/c from CM.        03/26/24 0919   Services At/After Discharge   Transition of Care Consult (CM Consult) Acute Rehab   Services At/After Discharge Inpatient rehab    Resource Information Provided? No  (N/a)   Mode of Transport at Discharge BLS  ([facility])   Hospital Transport Time of Discharge 1330   Confirm Follow Up Transport

## 2024-03-28 NOTE — PROGRESS NOTES
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 03/25/24 1507   Dressing Type Transparent 03/25/24 1507        Opportunity for questions and clarification was provided.      Patient transported with:  O2 @ 2lpm        
[facility]     Progress Note      3/26/2024 9:35 AM  NAME: Marilee Oakes   MRN:  [id number]   Admit Diagnosis: Closed fracture of left ankle, initial encounter [W28.620A]  Bimalleolar fracture of left ankle, closed, initial encounter [A18.605M]     Problem List:     --CAD  CM  HTN  PAF/Persistent s/p watchman - ASA only  Bradycardia  Dyslipidemia  CK  DM  LEELA  LE Venous Insufficiency      Assessment/Plan:     --Doing well. Discharging to [facility] today. Continue ASA, Coreg, No ACE due to CKD, Bumex 2. Amlodipine held due to BP. (122/64) this AM        [x]       High complexity decision making was performed in this patient at high risk for decompensation with multiple organ involvement.    We discussed the expected course, resolution and complications of the diagnoses in detail.  Medication risk, benefits, costs, interactions, and alternatives were discussed as indicated.  I advised him to contact the office if his condition worsens, changes or fails to improve as anticipated.  Patient expressed understanding with the diagnoses  and plan.    Subjective:     Marilee Oakes denies chest pain, dyspnea.  Discussed with RN events overnight.     Review of Systems:    Symptom Y/N Comments  Symptom Y/N Comments   Fever/Chills N   Chest Pain N    Poor Appetite N   Edema N    Cough N   Abdominal Pain N    Sputum N   Joint Pain N    SOB/NOVOA N   Pruritis/Rash N    Nausea/vomit N   Tolerating PT/OT Y    Diarrhea N   Tolerating Diet Y    Constipation N   Other       Could NOT obtain due to:      Objective:      Physical Exam:    Last 24hrs VS reviewed since prior progress note. Most recent are:    /64   Pulse 80   Temp 97.7 °F (36.5 °C)   Resp 18   Ht 1.753 m (5' 9\")   Wt 102.5 kg (226 lb)   SpO2 100%   BMI 33.37 kg/m²     Intake/Output Summary (Last 24 hours) at 3/26/2024 0935  Last data filed at 3/25/2024 1242  Gross per 24 hour   Intake --   Output 900 ml   Net -900 ml        General 
07/28/2023    Presence of Watchman left atrial appendage closure device 10/06/2021    Atrial fibrillation (HCC) 10/06/2021    Polypharmacy 05/19/2021    Open wound of left lower leg 03/31/2021    Lethargy 01/01/2021    Hypoglycemia 01/01/2021    Hypoxia 12/13/2020    Lower back pain 12/13/2020    Type 2 diabetes with nephropathy ([facility]) 04/30/2020    Acute exacerbation of COPD with asthma ([facility]) 08/17/2019    Asthma 01/31/2019    S/P placement of cardiac pacemaker 11/13/2018    Hypokalemia 10/07/2018    Anxiety and depression 01/22/2018    Chronic atrial fibrillation ([facility]) 06/05/2016    Lower abdominal pain 06/05/2016    Type 2 diabetes mellitus with diabetic neuropathy, without long-term current use of insulin ([facility]) 06/05/2016    Chronic systolic heart failure ([facility]) 06/05/2016    Recurrent UTI 06/04/2016    Foot pain, right 11/20/2013    HBP (high blood pressure) 02/22/2013    IBS (irritable bowel syndrome) 02/22/2013    Spinal stenosis, lumbar region, without neurogenic claudication 02/22/2013    B12 deficiency 02/22/2013    Right knee DJD 02/22/2013    Ataxia 02/22/2013     Past Medical History:   Diagnosis Date    Age-related osteoporosis without current pathological fracture     Anxiety and depression 01/22/2018    Arthritis     OA    Asthma     CAD (coronary artery disease)     Chronic systolic heart failure ([facility])     CKD stage G3b/A1, GFR 30-44 and albumin creatinine ratio <30 mg/g ([facility])     Essential hypertension     GERD (gastroesophageal reflux disease)     History of diverticulitis     diverticulitis    History of echocardiogram 11/2021    LV: Estimated LVEF is 40 - 45%. Visually measured ejection fraction. Normal cavity size and wall thickness. Globally reduced systolic function.  LA: Moderately dilated left atrium. Left atrial appendage is completely occluded. A Watchman left atrial appendage occluder is present and completely occludes the appendage.    History of migraine     Hypercholesterolemia 
frequency, urgency, dysuria, hematuria, incontinence   Muskuloskeletal : negative for arthralgia, myalgia   Hematology: negative for easy bruising, bleeding, lymphadenopathy   Dermatological: negative for rash, ulceration, mole change, new lesion   Endocrine: negative for hot flashes or polydipsia   Neurological: negative for headache, dizziness, confusion, focal weakness, paresthesia, memory loss, gait disturbance   Psychological: negative for anxiety, depression, agitation       Objective:      Physical Exam:  Temp (24hrs), Av.5 °F (36.9 °C), Min:97.7 °F (36.5 °C), Max:99.3 °F (37.4 °C)    Patient Vitals for the past 8 hrs:   Pulse   24 0857 80   24 0830 80   24 0339 80    Patient Vitals for the past 8 hrs:   Resp   24 0857 18   24 0830 18   24 0339 17    Patient Vitals for the past 8 hrs:   BP   24 0830 128/68   24 0339 134/71        Intake/Output Summary (Last 24 hours) at 3/25/2024 0902  Last data filed at 3/25/2024 0645  Gross per 24 hour   Intake --   Output 1425 ml   Net -1425 ml       Nondiaphoretic, not in acute distress.  No scleral icterus, mucous membranes moist, conjuctivae pink, no xanthelasma.  Unlabored, clear to auscultation bilaterally anteriorly, symmetric air movement.  Regular paced rhythm, no new murmur.  Abdomen, soft, nontender, nondistended.  Extremities without cyanosis or clubbing.  L leg boot.  Skin warm and dry.  No rashes or ulcers visible.  Neuro grossly nonfocal.  No tremor.  Awake and appropriate.    CARDIOGRAPHICS and STUDIES, I reviewed:    Telemetry:  Atrial fibrillation with V pacing.      Labs:  No results for input(s): \"CPK\", \"CKMB\" in the last 72 hours.    Invalid input(s): \"CPKMB\", \"CKNDX\", \"TROIQ\"  Lab Results   Component Value Date/Time    CHOL 99 10/27/2023 09:24 AM    HDL 33 10/27/2023 09:24 AM     No results for input(s): \"INR\", \"APTT\" in the last 72 hours.    Invalid input(s): \"PTP\"   Recent Labs     24  045 
orthopnea, PND, edema, syncope   Gastrointestinal: negative for abdominal pain, N/V, dysphagia, change in bowel habits, bleeding   Genitourinary: negative for frequency, urgency, dysuria, hematuria, incontinence   Muskuloskeletal : negative for arthralgia, myalgia   Hematology: negative for easy bruising, bleeding, lymphadenopathy   Dermatological: negative for rash, ulceration, mole change, new lesion   Endocrine: negative for hot flashes or polydipsia   Neurological: negative for headache, dizziness, confusion, focal weakness, paresthesia, memory loss, gait disturbance   Psychological: negative for anxiety, depression, agitation       Objective:      Physical Exam:  Temp (24hrs), Av.2 °F (36.8 °C), Min:97.7 °F (36.5 °C), Max:98.8 °F (37.1 °C)    Patient Vitals for the past 8 hrs:   Pulse   24 0856 80   24 0804 79      Patient Vitals for the past 8 hrs:   Resp   24 0856 18   24 0804 18      Patient Vitals for the past 8 hrs:   BP   24 0856 122/64          Intake/Output Summary (Last 24 hours) at 3/26/2024 1205  Last data filed at 3/25/2024 1242  Gross per 24 hour   Intake --   Output 900 ml   Net -900 ml         Nondiaphoretic, not in acute distress.  No scleral icterus, mucous membranes moist, conjuctivae pink, no xanthelasma.  Unlabored, clear to auscultation bilaterally anteriorly, symmetric air movement.  Regular paced rhythm, no new murmur.  Abdomen, soft, nontender, nondistended.  Extremities without cyanosis or clubbing.  L leg boot.  Skin warm and dry.  No rashes or ulcers visible.  Neuro grossly nonfocal.  No tremor.  Awake and appropriate.    CARDIOGRAPHICS and STUDIES, I reviewed:    Telemetry:  Atrial fibrillation with V pacing.      Labs:  No results for input(s): \"CPK\", \"CKMB\" in the last 72 hours.    Invalid input(s): \"CPKMB\", \"CKNDX\", \"TROIQ\"  Lab Results   Component Value Date/Time    CHOL 99 10/27/2023 09:24 AM    HDL 33 10/27/2023 09:24 AM     No results for

## 2024-04-03 ENCOUNTER — APPOINTMENT (OUTPATIENT)
Facility: HOSPITAL | Age: 74
End: 2024-04-03
Payer: MEDICARE

## 2024-04-03 ENCOUNTER — HOSPITAL ENCOUNTER (INPATIENT)
Facility: HOSPITAL | Age: 74
LOS: 9 days | Discharge: NURSING, REHAB OR LONG-TERM CARE | End: 2024-04-12
Attending: INTERNAL MEDICINE | Admitting: INTERNAL MEDICINE
Payer: MEDICARE

## 2024-04-03 DIAGNOSIS — T84.7XXA INFECTION OF LOWER EXTREMITY ASSOCIATED WITH HARDWARE (HCC): ICD-10-CM

## 2024-04-03 DIAGNOSIS — T81.49XA SURGICAL WOUND INFECTION: ICD-10-CM

## 2024-04-03 DIAGNOSIS — S82.842A BIMALLEOLAR FRACTURE OF LEFT ANKLE, CLOSED, INITIAL ENCOUNTER: Primary | ICD-10-CM

## 2024-04-03 DIAGNOSIS — T81.9XXA COMPLICATION OF PROCEDURE, INITIAL ENCOUNTER: ICD-10-CM

## 2024-04-03 DIAGNOSIS — R78.81 MRSA BACTEREMIA: ICD-10-CM

## 2024-04-03 DIAGNOSIS — R60.0 LOWER EXTREMITY EDEMA: ICD-10-CM

## 2024-04-03 DIAGNOSIS — B95.62 MRSA BACTEREMIA: ICD-10-CM

## 2024-04-03 DIAGNOSIS — I50.21 ACUTE SYSTOLIC HEART FAILURE (HCC): ICD-10-CM

## 2024-04-03 LAB
ALBUMIN SERPL-MCNC: 2.2 G/DL (ref 3.5–5)
ALBUMIN/GLOB SERPL: 0.5 (ref 1.1–2.2)
ALP SERPL-CCNC: 220 U/L (ref 45–117)
ALT SERPL-CCNC: 14 U/L (ref 12–78)
ANION GAP SERPL CALC-SCNC: 4 MMOL/L (ref 5–15)
AST SERPL-CCNC: 18 U/L (ref 15–37)
BASOPHILS # BLD: 0.1 K/UL (ref 0–0.1)
BASOPHILS NFR BLD: 1 % (ref 0–1)
BILIRUB SERPL-MCNC: 0.6 MG/DL (ref 0.2–1)
BUN SERPL-MCNC: 30 MG/DL (ref 6–20)
BUN/CREAT SERPL: 21 (ref 12–20)
CALCIUM SERPL-MCNC: 9.2 MG/DL (ref 8.5–10.1)
CHLORIDE SERPL-SCNC: 105 MMOL/L (ref 97–108)
CO2 SERPL-SCNC: 34 MMOL/L (ref 21–32)
CREAT SERPL-MCNC: 1.4 MG/DL (ref 0.55–1.02)
DIFFERENTIAL METHOD BLD: ABNORMAL
EOSINOPHIL # BLD: 0.2 K/UL (ref 0–0.4)
EOSINOPHIL NFR BLD: 3 % (ref 0–7)
ERYTHROCYTE [DISTWIDTH] IN BLOOD BY AUTOMATED COUNT: 17.2 % (ref 11.5–14.5)
GLOBULIN SER CALC-MCNC: 4.5 G/DL (ref 2–4)
GLUCOSE BLD STRIP.AUTO-MCNC: 122 MG/DL (ref 65–117)
GLUCOSE BLD STRIP.AUTO-MCNC: 130 MG/DL (ref 65–117)
GLUCOSE BLD STRIP.AUTO-MCNC: 88 MG/DL (ref 65–117)
GLUCOSE SERPL-MCNC: 111 MG/DL (ref 65–100)
HCT VFR BLD AUTO: 33 % (ref 35–47)
HGB BLD-MCNC: 9.1 G/DL (ref 11.5–16)
IMM GRANULOCYTES # BLD AUTO: 0.1 K/UL (ref 0–0.04)
IMM GRANULOCYTES NFR BLD AUTO: 2 % (ref 0–0.5)
LACTATE BLD-SCNC: 0.5 MMOL/L (ref 0.4–2)
LACTATE BLD-SCNC: 2.33 MMOL/L (ref 0.4–2)
LYMPHOCYTES # BLD: 1 K/UL (ref 0.8–3.5)
LYMPHOCYTES NFR BLD: 15 % (ref 12–49)
MCH RBC QN AUTO: 24.5 PG (ref 26–34)
MCHC RBC AUTO-ENTMCNC: 27.6 G/DL (ref 30–36.5)
MCV RBC AUTO: 88.7 FL (ref 80–99)
MONOCYTES # BLD: 0.6 K/UL (ref 0–1)
MONOCYTES NFR BLD: 9 % (ref 5–13)
NEUTS SEG # BLD: 4.7 K/UL (ref 1.8–8)
NEUTS SEG NFR BLD: 70 % (ref 32–75)
NRBC # BLD: 0 K/UL (ref 0–0.01)
NRBC BLD-RTO: 0 PER 100 WBC
PLATELET # BLD AUTO: 271 K/UL (ref 150–400)
PMV BLD AUTO: 11.9 FL (ref 8.9–12.9)
POTASSIUM SERPL-SCNC: 3.9 MMOL/L (ref 3.5–5.1)
PROT SERPL-MCNC: 6.7 G/DL (ref 6.4–8.2)
RBC # BLD AUTO: 3.72 M/UL (ref 3.8–5.2)
RBC MORPH BLD: ABNORMAL
SERVICE CMNT-IMP: ABNORMAL
SERVICE CMNT-IMP: ABNORMAL
SERVICE CMNT-IMP: NORMAL
SODIUM SERPL-SCNC: 143 MMOL/L (ref 136–145)
WBC # BLD AUTO: 6.7 K/UL (ref 3.6–11)

## 2024-04-03 PROCEDURE — 6370000000 HC RX 637 (ALT 250 FOR IP): Performed by: INTERNAL MEDICINE

## 2024-04-03 PROCEDURE — 87070 CULTURE OTHR SPECIMN AEROBIC: CPT

## 2024-04-03 PROCEDURE — 73610 X-RAY EXAM OF ANKLE: CPT

## 2024-04-03 PROCEDURE — 93971 EXTREMITY STUDY: CPT

## 2024-04-03 PROCEDURE — 1100000003 HC PRIVATE W/ TELEMETRY

## 2024-04-03 PROCEDURE — 2580000003 HC RX 258: Performed by: INTERNAL MEDICINE

## 2024-04-03 PROCEDURE — 82962 GLUCOSE BLOOD TEST: CPT

## 2024-04-03 PROCEDURE — 80053 COMPREHEN METABOLIC PANEL: CPT

## 2024-04-03 PROCEDURE — 87147 CULTURE TYPE IMMUNOLOGIC: CPT

## 2024-04-03 PROCEDURE — 36415 COLL VENOUS BLD VENIPUNCTURE: CPT

## 2024-04-03 PROCEDURE — 83605 ASSAY OF LACTIC ACID: CPT

## 2024-04-03 PROCEDURE — 6360000002 HC RX W HCPCS: Performed by: PHYSICIAN ASSISTANT

## 2024-04-03 PROCEDURE — 85025 COMPLETE CBC W/AUTO DIFF WBC: CPT

## 2024-04-03 PROCEDURE — 2580000003 HC RX 258: Performed by: PHYSICIAN ASSISTANT

## 2024-04-03 PROCEDURE — 87205 SMEAR GRAM STAIN: CPT

## 2024-04-03 PROCEDURE — APPNB15 APP NON BILLABLE TIME 0-15 MINS: Performed by: PHYSICIAN ASSISTANT

## 2024-04-03 PROCEDURE — 99284 EMERGENCY DEPT VISIT MOD MDM: CPT

## 2024-04-03 PROCEDURE — 87040 BLOOD CULTURE FOR BACTERIA: CPT

## 2024-04-03 PROCEDURE — 87186 SC STD MICRODIL/AGAR DIL: CPT

## 2024-04-03 PROCEDURE — 87077 CULTURE AEROBIC IDENTIFY: CPT

## 2024-04-03 RX ORDER — ACETAMINOPHEN 650 MG/1
650 SUPPOSITORY RECTAL EVERY 6 HOURS PRN
Status: DISCONTINUED | OUTPATIENT
Start: 2024-04-03 | End: 2024-04-12 | Stop reason: HOSPADM

## 2024-04-03 RX ORDER — 0.9 % SODIUM CHLORIDE 0.9 %
30 INTRAVENOUS SOLUTION INTRAVENOUS ONCE
Status: COMPLETED | OUTPATIENT
Start: 2024-04-03 | End: 2024-04-03

## 2024-04-03 RX ORDER — ACETAMINOPHEN 325 MG/1
650 TABLET ORAL EVERY 6 HOURS PRN
Status: DISCONTINUED | OUTPATIENT
Start: 2024-04-03 | End: 2024-04-12 | Stop reason: HOSPADM

## 2024-04-03 RX ORDER — TRAMADOL HYDROCHLORIDE 50 MG/1
50 TABLET ORAL EVERY 6 HOURS PRN
Status: DISCONTINUED | OUTPATIENT
Start: 2024-04-03 | End: 2024-04-12 | Stop reason: HOSPADM

## 2024-04-03 RX ORDER — ONDANSETRON 2 MG/ML
4 INJECTION INTRAMUSCULAR; INTRAVENOUS EVERY 6 HOURS PRN
Status: DISCONTINUED | OUTPATIENT
Start: 2024-04-03 | End: 2024-04-12 | Stop reason: HOSPADM

## 2024-04-03 RX ORDER — SODIUM CHLORIDE 9 MG/ML
INJECTION, SOLUTION INTRAVENOUS PRN
Status: DISCONTINUED | OUTPATIENT
Start: 2024-04-03 | End: 2024-04-12 | Stop reason: HOSPADM

## 2024-04-03 RX ORDER — ALBUTEROL SULFATE 2.5 MG/3ML
2.5 SOLUTION RESPIRATORY (INHALATION) EVERY 4 HOURS PRN
Status: DISCONTINUED | OUTPATIENT
Start: 2024-04-03 | End: 2024-04-12 | Stop reason: HOSPADM

## 2024-04-03 RX ORDER — PANTOPRAZOLE SODIUM 40 MG/1
40 TABLET, DELAYED RELEASE ORAL
Status: DISCONTINUED | OUTPATIENT
Start: 2024-04-04 | End: 2024-04-12 | Stop reason: HOSPADM

## 2024-04-03 RX ORDER — POTASSIUM CHLORIDE 20 MEQ/1
10 TABLET, EXTENDED RELEASE ORAL DAILY
Status: DISCONTINUED | OUTPATIENT
Start: 2024-04-03 | End: 2024-04-12 | Stop reason: HOSPADM

## 2024-04-03 RX ORDER — INSULIN LISPRO 100 [IU]/ML
0-4 INJECTION, SOLUTION INTRAVENOUS; SUBCUTANEOUS
Status: DISCONTINUED | OUTPATIENT
Start: 2024-04-03 | End: 2024-04-12 | Stop reason: HOSPADM

## 2024-04-03 RX ORDER — ASPIRIN 81 MG/1
81 TABLET, CHEWABLE ORAL DAILY
Status: DISCONTINUED | OUTPATIENT
Start: 2024-04-03 | End: 2024-04-12 | Stop reason: HOSPADM

## 2024-04-03 RX ORDER — ISOSORBIDE MONONITRATE 30 MG/1
60 TABLET, EXTENDED RELEASE ORAL DAILY
Status: DISCONTINUED | OUTPATIENT
Start: 2024-04-03 | End: 2024-04-12 | Stop reason: HOSPADM

## 2024-04-03 RX ORDER — ALBUTEROL SULFATE 90 UG/1
1 AEROSOL, METERED RESPIRATORY (INHALATION) EVERY 4 HOURS PRN
Status: DISCONTINUED | OUTPATIENT
Start: 2024-04-03 | End: 2024-04-03

## 2024-04-03 RX ORDER — ONDANSETRON 2 MG/ML
4 INJECTION INTRAMUSCULAR; INTRAVENOUS EVERY 4 HOURS PRN
Status: DISCONTINUED | OUTPATIENT
Start: 2024-04-03 | End: 2024-04-03

## 2024-04-03 RX ORDER — INSULIN LISPRO 100 [IU]/ML
0-4 INJECTION, SOLUTION INTRAVENOUS; SUBCUTANEOUS NIGHTLY
Status: DISCONTINUED | OUTPATIENT
Start: 2024-04-03 | End: 2024-04-12 | Stop reason: HOSPADM

## 2024-04-03 RX ORDER — SODIUM CHLORIDE 0.9 % (FLUSH) 0.9 %
5-40 SYRINGE (ML) INJECTION EVERY 12 HOURS SCHEDULED
Status: DISCONTINUED | OUTPATIENT
Start: 2024-04-03 | End: 2024-04-12 | Stop reason: HOSPADM

## 2024-04-03 RX ORDER — CASTOR OIL AND BALSAM, PERU 788; 87 MG/G; MG/G
OINTMENT TOPICAL 3 TIMES DAILY
Status: DISCONTINUED | OUTPATIENT
Start: 2024-04-03 | End: 2024-04-12 | Stop reason: HOSPADM

## 2024-04-03 RX ORDER — CARVEDILOL 12.5 MG/1
25 TABLET ORAL 2 TIMES DAILY WITH MEALS
Status: DISCONTINUED | OUTPATIENT
Start: 2024-04-03 | End: 2024-04-12 | Stop reason: HOSPADM

## 2024-04-03 RX ORDER — POLYETHYLENE GLYCOL 3350 17 G/17G
17 POWDER, FOR SOLUTION ORAL DAILY PRN
Status: DISCONTINUED | OUTPATIENT
Start: 2024-04-03 | End: 2024-04-12 | Stop reason: HOSPADM

## 2024-04-03 RX ORDER — ACETAMINOPHEN 325 MG/1
650 TABLET ORAL EVERY 6 HOURS PRN
Status: DISCONTINUED | OUTPATIENT
Start: 2024-04-03 | End: 2024-04-03

## 2024-04-03 RX ORDER — ONDANSETRON 4 MG/1
4 TABLET, ORALLY DISINTEGRATING ORAL EVERY 8 HOURS PRN
Status: DISCONTINUED | OUTPATIENT
Start: 2024-04-03 | End: 2024-04-12 | Stop reason: HOSPADM

## 2024-04-03 RX ORDER — BUMETANIDE 1 MG/1
2 TABLET ORAL DAILY
Status: DISCONTINUED | OUTPATIENT
Start: 2024-04-03 | End: 2024-04-12 | Stop reason: HOSPADM

## 2024-04-03 RX ORDER — SODIUM CHLORIDE 0.9 % (FLUSH) 0.9 %
5-40 SYRINGE (ML) INJECTION PRN
Status: DISCONTINUED | OUTPATIENT
Start: 2024-04-03 | End: 2024-04-12 | Stop reason: HOSPADM

## 2024-04-03 RX ORDER — LANOLIN ALCOHOL/MO/W.PET/CERES
400 CREAM (GRAM) TOPICAL 2 TIMES DAILY
Status: DISCONTINUED | OUTPATIENT
Start: 2024-04-03 | End: 2024-04-12 | Stop reason: HOSPADM

## 2024-04-03 RX ORDER — PREGABALIN 150 MG/1
300 CAPSULE ORAL DAILY
Status: DISCONTINUED | OUTPATIENT
Start: 2024-04-03 | End: 2024-04-12 | Stop reason: HOSPADM

## 2024-04-03 RX ORDER — CLONAZEPAM 0.5 MG/1
0.5 TABLET ORAL NIGHTLY
Status: DISCONTINUED | OUTPATIENT
Start: 2024-04-03 | End: 2024-04-12 | Stop reason: HOSPADM

## 2024-04-03 RX ADMIN — Medication: at 20:27

## 2024-04-03 RX ADMIN — SODIUM CHLORIDE, PRESERVATIVE FREE 10 ML: 5 INJECTION INTRAVENOUS at 20:26

## 2024-04-03 RX ADMIN — PREGABALIN 300 MG: 150 CAPSULE ORAL at 18:39

## 2024-04-03 RX ADMIN — POTASSIUM CHLORIDE 10 MEQ: 1500 TABLET, EXTENDED RELEASE ORAL at 18:38

## 2024-04-03 RX ADMIN — Medication 400 MG: at 20:25

## 2024-04-03 RX ADMIN — SODIUM CHLORIDE 1986 ML: 9 INJECTION, SOLUTION INTRAVENOUS at 13:52

## 2024-04-03 RX ADMIN — BUMETANIDE 2 MG: 1 TABLET ORAL at 18:38

## 2024-04-03 RX ADMIN — Medication 2500 MG: at 15:59

## 2024-04-03 RX ADMIN — CLONAZEPAM 0.5 MG: 0.5 TABLET ORAL at 20:25

## 2024-04-03 RX ADMIN — ISOSORBIDE MONONITRATE 60 MG: 30 TABLET, EXTENDED RELEASE ORAL at 18:38

## 2024-04-03 RX ADMIN — CARVEDILOL 25 MG: 12.5 TABLET, FILM COATED ORAL at 18:39

## 2024-04-03 RX ADMIN — ASPIRIN 81 MG: 81 TABLET, CHEWABLE ORAL at 18:39

## 2024-04-03 ASSESSMENT — PAIN - FUNCTIONAL ASSESSMENT: PAIN_FUNCTIONAL_ASSESSMENT: NONE - DENIES PAIN

## 2024-04-03 ASSESSMENT — PAIN SCALES - GENERAL: PAINLEVEL_OUTOF10: 0

## 2024-04-03 NOTE — H&P
Hospitalist Admission Note    NAME:   Marilee Oakes   : 1950   MRN: [id number]     Date/Time: 4/3/2024 4:04 PM    Patient PCP: Monalisa Thorpe LPN    ______________________________________________________________________  Given the patient's current clinical presentation, I have a high level of concern for decompensation if discharged from the emergency department.  Complex decision making was performed, which includes reviewing the patient's available past medical records, laboratory results, and x-ray films.       My assessment of this patient's clinical condition and my plan of care is as follows.    Assessment / Plan:    MRSA bacteremia  Recent left ankle open reduction internal fixation  Fever  Lactic acidosis  -Patient developed fever while at rehab and blood cultures were ordered which grew MRSA and was sent to [facility]  -Patient underwent a left ankle open reduction into fixation on 3/22 for left ankle fracture.  -In the emergency department, patient was noted to have small amount of pus coming from left ankle incision according to ER PA which was sent for cultures  -X-ray of left ankle is pending.  Will consult orthopedics.  Keep n.p.o. from midnight.  Consult pharmacy for vancomycin dosing.  If patient has any further fever while on vancomycin, consider broadening antibiotic coverage.  For now continue vancomycin.  -She does have mild lactic acid of 2.3 but has no leukocytosis or tachycardia.  Received bolus IV fluids in the ED.  Holding off on further IV fluids due to CHF.  Recheck lactic acid in about 6 hours.  -Check CBC and BMP in a.m.  -Pending ultrasound Doppler of left leg      CKD stage III  -Baseline creatinine is around 1.2-1.4.  Currently creatinine is 1.4.  Check BMP in a.m.    Chronic respiratory failure due to COPD on 2 L at all times  Obstructive sleep apnea on CPAP  Hypertension  Coronary artery disease  Chronic systolic congestive heart failure s/p AICD  Atrial fibrillation

## 2024-04-03 NOTE — PROGRESS NOTES
Pharmacy Antimicrobial Kinetic Dosing    Indication for Antimicrobials: bloodstream infection (hx MRSA bacteremia)    Current Regimen of Each Antimicrobial:  Vancomycin pharmacy to dose; Start Date 4/3; Day # 1    Previous Antimicrobial Therapy:     Goal Level: Vancomycin -600    Date Dose & Interval Measured (mcg/mL) Predicted AUC                       Significant Cultures:   4/3: blood: ngtd  4/3: wound: prelim    Labs:  Recent Labs     Units 24  1315   CREATININE MG/DL 1.40*   BUN MG/DL 30*   WBC K/uL 6.7     Temp (24hrs), Av.2 °F (36.8 °C), Min:98.2 °F (36.8 °C), Max:98.2 °F (36.8 °C)      Conditions for Dosing Consideration: None    Creatinine Clearance (mL/min): Estimated Creatinine Clearance: 49 mL/min (A) (based on SCr of 1.4 mg/dL (H)).       Impression/Plan:   Vancomycin 2500mg load, then 1250mg q 24h for auc 520  Cbc and bmp ordered for   Antimicrobial stop date to be determined     Pharmacy will follow daily and adjust medications as appropriate for renal function and/or serum levels.    Thank you,  Rj Patel RPH

## 2024-04-03 NOTE — CONSULTS
MEQ extended release tablet TAKE 1 TABLET FOUR TIMES DAILY 5/16/23   Cecilio Tena MD   acetaminophen (TYLENOL) 500 MG tablet Take 1 tablet by mouth every 6 hours as needed    Automatic Reconciliation, Ar   albuterol sulfate HFA (PROVENTIL;VENTOLIN;PROAIR) 108 (90 Base) MCG/ACT inhaler Inhale 1 puff into the lungs every 4 hours as needed 8/17/19   Automatic Reconciliation, Ar   vitamin D 25 MCG (1000 UT) CAPS Take by mouth daily    Automatic Reconciliation, Ar   clotrimazole-betamethasone (LOTRISONE) 1-0.05 % cream Apply topically 2 times daily    Automatic Reconciliation, Ar   magnesium oxide (MAG-OX) 400 MG tablet Take 1 tablet by mouth 2 times daily 9/29/20   Automatic Reconciliation, Ar   sodium chloride (OCEAN) 0.65 % nasal spray 2 sprays by Nasal route every 8 hours as needed    Automatic Reconciliation, Ar   venlafaxine (EFFEXOR XR) 150 MG extended release capsule Take 1 capsule by mouth 2 times daily (with meals)  Patient not taking: Reported on 3/23/2024    Automatic Reconciliation, Ar     Current Facility-Administered Medications   Medication Dose Route Frequency    vancomycin (VANCOCIN) 2500 mg in sodium chloride 0.9 % 500 mL IVPB  2,500 mg IntraVENous Once     Current Outpatient Medications   Medication Sig    naloxone (NARCAN) 4 MG/0.1ML LIQD nasal spray 1 spray by Nasal route as needed for Opioid Reversal    polyethylene glycol (GLYCOLAX) 17 g packet Take 1 packet by mouth daily as needed for Constipation    budesonide-formoterol (SYMBICORT) 160-4.5 MCG/ACT AERO Inhale 2 puffs into the lungs 2 times daily    clonazePAM (KLONOPIN) 0.5 MG tablet Take 1 tablet by mouth nightly for 15 days. Max Daily Amount: 0.5 mg    aspirin 81 MG chewable tablet Take 1 tablet by mouth daily    balsum peru-castor oil (VENELEX) OINT ointment Apply topically 2 times daily    bumetanide (BUMEX) 2 MG tablet TAKE 1 TABLET EVERY DAY    omeprazole (PRILOSEC) 40 MG delayed release capsule TAKE 1 CAPSULE EVERY DAY    pregabalin  32 mmol/L    Anion Gap 4 (L) 5 - 15 mmol/L    Glucose 111 (H) 65 - 100 mg/dL    BUN 30 (H) 6 - 20 MG/DL    Creatinine 1.40 (H) 0.55 - 1.02 MG/DL    Bun/Cre Ratio 21 (H) 12 - 20      Est, Glom Filt Rate 40 (L) >60 ml/min/1.73m2    Calcium 9.2 8.5 - 10.1 MG/DL    Total Bilirubin 0.6 0.2 - 1.0 MG/DL    ALT 14 12 - 78 U/L    AST 18 15 - 37 U/L    Alk Phosphatase 220 (H) 45 - 117 U/L    Total Protein 6.7 6.4 - 8.2 g/dL    Albumin 2.2 (L) 3.5 - 5.0 g/dL    Globulin 4.5 (H) 2.0 - 4.0 g/dL    Albumin/Globulin Ratio 0.5 (L) 1.1 - 2.2     POC Lactic Acid    Collection Time: 04/03/24  1:16 PM   Result Value Ref Range    POC Lactic Acid 2.33 (HH) 0.40 - 2.00 mmol/L         Impression:     Patient Active Problem List    Diagnosis Date Noted    Coronary artery disease of native heart with stable angina pectoris, unspecified vessel or lesion type ([facility])     Closed fracture of multiple ribs of right side with routine healing 08/31/2021    Ingrown toenail of left foot 12/17/2019    Lower extremity edema 12/17/2019    Venous stasis dermatitis of both lower extremities 12/17/2019    Obesity (BMI 30-39.9) 04/30/2019    Chronic cholecystitis 11/16/2018    Ischemic cardiomyopathy 11/13/2018    Stage 3a chronic kidney disease (HCC) 11/13/2018    Anxiety 01/22/2018    Long-term use of high-risk medication 01/22/2018    Hypercholesterolemia 01/22/2018    Cellulitis of left lower leg 06/06/2016    Bimalleolar fracture of left ankle, closed, initial encounter 03/22/2024    Heart failure (HCC) 01/22/2024    Endocarditis 12/26/2023    Nausea and vomiting 12/21/2023    MRSA bacteremia 12/21/2023    Ulcer of right foot with muscle involvement without evidence of necrosis (HCC) 12/21/2023    Diabetic polyneuropathy associated with type 2 diabetes mellitus (HCC) 12/21/2023    Poorly controlled diabetes mellitus (HCC) 12/21/2023    Presence of combination internal cardiac defibrillator (ICD) and pacemaker 12/21/2023    Fever and chills 12/19/2023

## 2024-04-03 NOTE — ED PROVIDER NOTES
[facility] EMERGENCY DEPT  EMERGENCY DEPARTMENT ENCOUNTER       Pt Name: Marilee Oakes  MRN: [id number]  Birthdate 1950  Date of evaluation: 4/3/2024  Provider: NIKKI Mckeon   PCP: Monalisa Thorpe LPN  Note Started: 1:42 PM EDT 4/3/24     CHIEF COMPLAINT       Chief Complaint   Patient presents with    Blood Infection     BIBEMS from [facility]. Pt here a week ago and underwent surgery for an open tib fib fx. Pt transferred here d/t MRSA found in her blood. Pt on 2lpm O2 at baseline for COPD.       HISTORY OF PRESENT ILLNESS: 1 or more elements      History From: Patient  HPI Limitations: None     Marilee Oakes is a 73 y.o. female who presents by EMS from [facility] and rehabilitation after a positive blood culture.  Patient reports that she had ankle surgery about 1 week ago due to an ankle fracture.  She was discharged to a rehab facility and noted to develop a fever a couple days ago.  This prompted blood cultures to be ordered as an outpatient.  The results came back today and are positive for MRSA.  Patient denies pain to the lower leg.  There is been no fall or injury since surgery.     Nursing Notes were all reviewed and agreed with or any disagreements were addressed in the HPI.     REVIEW OF SYSTEMS      Review of Systems     Positives and Pertinent negatives as per HPI.    PAST HISTORY     Past Medical History:  Past Medical History:   Diagnosis Date    Age-related osteoporosis without current pathological fracture     Anxiety and depression 01/22/2018    Arthritis     OA    Asthma     CAD (coronary artery disease)     Chronic systolic heart failure (HCC)     CKD stage G3b/A1, GFR 30-44 and albumin creatinine ratio <30 mg/g (HCC)     Essential hypertension     GERD (gastroesophageal reflux disease)     History of diverticulitis     diverticulitis    History of echocardiogram 11/2021    LV: Estimated LVEF is 40 - 45%. Visually measured ejection fraction. Normal cavity  ordered, EKG's are interpreted by the Emergency Department Physician in the absence of a cardiologist.  Please see their note for interpretation of EKG.      RADIOLOGY:  Non-plain film images such as CT, Ultrasound and MRI are read by the radiologist. Plain radiographic images are visualized and preliminarily interpreted by the ED Provider with the below findings:     N/A     Interpretation per the Radiologist below, if available at the time of this note:     EXAM: XR ANKLE LEFT (MIN 3 VIEWS)     INDICATION: recent surgery for alexsandra ankle fracture.     COMPARISON: 3/22/2024.     FINDINGS: Three views of the left ankle demonstrate interval plate and screw  fixation of the fibular fracture with syndesmotic screw. 2 screw fixate the  medial malleolar fracture. Fibular fracture demonstrates one half shafts width  posterior displacement of the distal fracture fragment. There is less than one  half shafts width lateral displacement of the medial malleolar fracture. Bones  are osteopenic. Degenerative changes in the midfoot.     IMPRESSION:     1. Postoperative changes of the ankle detailed above.     PROCEDURES   Unless otherwise noted below, none  Procedures     CRITICAL CARE TIME   Patient does not meet Critical Care Time, 0 minutes     EMERGENCY DEPARTMENT COURSE and DIFFERENTIAL DIAGNOSIS/MDM   Vitals:    Vitals:    04/03/24 1224 04/03/24 1230 04/03/24 1245 04/03/24 1300   BP: 122/74 128/70 134/62 134/82   Pulse: 87      Resp: 18      Temp: 98.2 °F (36.8 °C)      SpO2: 98% 97% 96% 100%   Weight: 109.5 kg (241 lb 6.5 oz)           Patient was given the following medications:  Medications   vancomycin (VANCOCIN) 2500 mg in sodium chloride 0.9 % 500 mL IVPB (has no administration in time range)   sodium chloride 0.9 % bolus 1,986 mL (1,986 mLs IntraVENous New Bag 4/3/24 1352)       CONSULTS: (Who and What was discussed)  IP CONSULT TO ORTHOPEDIC SURGERY  IP CONSULT TO PHARMACY  IP WOUND CARE NURSE CONSULT TO LIZZETTE  IP

## 2024-04-03 NOTE — ED NOTES
1530 wet and soiled brief changed, fresh brief and purewick in place. Pt repositioned for comfort    1600 rpt lactic postponed d/t IV not drawing back, unsuccessful attempts at straight stick and new IV start

## 2024-04-04 LAB
ANION GAP SERPL CALC-SCNC: 2 MMOL/L (ref 5–15)
BASOPHILS # BLD: 0.1 K/UL (ref 0–0.1)
BASOPHILS NFR BLD: 1 % (ref 0–1)
BUN SERPL-MCNC: 26 MG/DL (ref 6–20)
BUN/CREAT SERPL: 20 (ref 12–20)
CALCIUM SERPL-MCNC: 9 MG/DL (ref 8.5–10.1)
CHLORIDE SERPL-SCNC: 107 MMOL/L (ref 97–108)
CO2 SERPL-SCNC: 32 MMOL/L (ref 21–32)
CREAT SERPL-MCNC: 1.29 MG/DL (ref 0.55–1.02)
DIFFERENTIAL METHOD BLD: ABNORMAL
EOSINOPHIL # BLD: 0.3 K/UL (ref 0–0.4)
EOSINOPHIL NFR BLD: 4 % (ref 0–7)
ERYTHROCYTE [DISTWIDTH] IN BLOOD BY AUTOMATED COUNT: 17.3 % (ref 11.5–14.5)
GLUCOSE BLD STRIP.AUTO-MCNC: 122 MG/DL (ref 65–117)
GLUCOSE BLD STRIP.AUTO-MCNC: 128 MG/DL (ref 65–117)
GLUCOSE BLD STRIP.AUTO-MCNC: 157 MG/DL (ref 65–117)
GLUCOSE BLD STRIP.AUTO-MCNC: 241 MG/DL (ref 65–117)
GLUCOSE SERPL-MCNC: 123 MG/DL (ref 65–100)
HCT VFR BLD AUTO: 30.2 % (ref 35–47)
HGB BLD-MCNC: 8.2 G/DL (ref 11.5–16)
IMM GRANULOCYTES # BLD AUTO: 0.1 K/UL (ref 0–0.04)
IMM GRANULOCYTES NFR BLD AUTO: 2 % (ref 0–0.5)
LYMPHOCYTES # BLD: 0.8 K/UL (ref 0.8–3.5)
LYMPHOCYTES NFR BLD: 12 % (ref 12–49)
MCH RBC QN AUTO: 24.3 PG (ref 26–34)
MCHC RBC AUTO-ENTMCNC: 27.2 G/DL (ref 30–36.5)
MCV RBC AUTO: 89.3 FL (ref 80–99)
MONOCYTES # BLD: 0.5 K/UL (ref 0–1)
MONOCYTES NFR BLD: 8 % (ref 5–13)
NEUTS SEG # BLD: 4.8 K/UL (ref 1.8–8)
NEUTS SEG NFR BLD: 73 % (ref 32–75)
NRBC # BLD: 0 K/UL (ref 0–0.01)
NRBC BLD-RTO: 0 PER 100 WBC
PLATELET # BLD AUTO: 336 K/UL (ref 150–400)
PMV BLD AUTO: 10.4 FL (ref 8.9–12.9)
POTASSIUM SERPL-SCNC: 3.8 MMOL/L (ref 3.5–5.1)
RBC # BLD AUTO: 3.38 M/UL (ref 3.8–5.2)
RBC MORPH BLD: ABNORMAL
SERVICE CMNT-IMP: ABNORMAL
SODIUM SERPL-SCNC: 141 MMOL/L (ref 136–145)
WBC # BLD AUTO: 6.6 K/UL (ref 3.6–11)

## 2024-04-04 PROCEDURE — 94640 AIRWAY INHALATION TREATMENT: CPT

## 2024-04-04 PROCEDURE — APPNB15 APP NON BILLABLE TIME 0-15 MINS: Performed by: PHYSICIAN ASSISTANT

## 2024-04-04 PROCEDURE — 6360000002 HC RX W HCPCS: Performed by: STUDENT IN AN ORGANIZED HEALTH CARE EDUCATION/TRAINING PROGRAM

## 2024-04-04 PROCEDURE — 36415 COLL VENOUS BLD VENIPUNCTURE: CPT

## 2024-04-04 PROCEDURE — 2700000000 HC OXYGEN THERAPY PER DAY

## 2024-04-04 PROCEDURE — 2580000003 HC RX 258: Performed by: INTERNAL MEDICINE

## 2024-04-04 PROCEDURE — 1100000003 HC PRIVATE W/ TELEMETRY

## 2024-04-04 PROCEDURE — 97607 NEG PRS WND THR NDME<=50SQCM: CPT

## 2024-04-04 PROCEDURE — 85025 COMPLETE CBC W/AUTO DIFF WBC: CPT

## 2024-04-04 PROCEDURE — 6370000000 HC RX 637 (ALT 250 FOR IP): Performed by: INTERNAL MEDICINE

## 2024-04-04 PROCEDURE — 94760 N-INVAS EAR/PLS OXIMETRY 1: CPT

## 2024-04-04 PROCEDURE — 99223 1ST HOSP IP/OBS HIGH 75: CPT | Performed by: INTERNAL MEDICINE

## 2024-04-04 PROCEDURE — 6360000002 HC RX W HCPCS: Performed by: INTERNAL MEDICINE

## 2024-04-04 PROCEDURE — 82962 GLUCOSE BLOOD TEST: CPT

## 2024-04-04 PROCEDURE — 80048 BASIC METABOLIC PNL TOTAL CA: CPT

## 2024-04-04 RX ORDER — ENOXAPARIN SODIUM 100 MG/ML
40 INJECTION SUBCUTANEOUS DAILY
Status: DISCONTINUED | OUTPATIENT
Start: 2024-04-04 | End: 2024-04-08

## 2024-04-04 RX ADMIN — Medication: at 12:38

## 2024-04-04 RX ADMIN — TRAMADOL HYDROCHLORIDE 50 MG: 50 TABLET ORAL at 10:17

## 2024-04-04 RX ADMIN — MUPIROCIN: 20 OINTMENT TOPICAL at 22:12

## 2024-04-04 RX ADMIN — ISOSORBIDE MONONITRATE 60 MG: 30 TABLET, EXTENDED RELEASE ORAL at 10:17

## 2024-04-04 RX ADMIN — POTASSIUM CHLORIDE 10 MEQ: 1500 TABLET, EXTENDED RELEASE ORAL at 10:17

## 2024-04-04 RX ADMIN — ARFORMOTEROL TARTRATE: 15 SOLUTION RESPIRATORY (INHALATION) at 07:26

## 2024-04-04 RX ADMIN — CLONAZEPAM 0.5 MG: 0.5 TABLET ORAL at 22:05

## 2024-04-04 RX ADMIN — BUMETANIDE 2 MG: 1 TABLET ORAL at 10:17

## 2024-04-04 RX ADMIN — VANCOMYCIN HYDROCHLORIDE 1250 MG: 10 INJECTION, POWDER, LYOPHILIZED, FOR SOLUTION INTRAVENOUS at 17:34

## 2024-04-04 RX ADMIN — Medication: at 10:18

## 2024-04-04 RX ADMIN — Medication 400 MG: at 10:17

## 2024-04-04 RX ADMIN — Medication 400 MG: at 22:03

## 2024-04-04 RX ADMIN — SODIUM CHLORIDE, PRESERVATIVE FREE 10 ML: 5 INJECTION INTRAVENOUS at 10:18

## 2024-04-04 RX ADMIN — ENOXAPARIN SODIUM 40 MG: 100 INJECTION SUBCUTANEOUS at 12:42

## 2024-04-04 RX ADMIN — MUPIROCIN: 20 OINTMENT TOPICAL at 14:35

## 2024-04-04 RX ADMIN — PANTOPRAZOLE SODIUM 40 MG: 40 TABLET, DELAYED RELEASE ORAL at 06:25

## 2024-04-04 RX ADMIN — SODIUM CHLORIDE, PRESERVATIVE FREE 10 ML: 5 INJECTION INTRAVENOUS at 22:06

## 2024-04-04 RX ADMIN — Medication: at 17:32

## 2024-04-04 RX ADMIN — CARVEDILOL 25 MG: 12.5 TABLET, FILM COATED ORAL at 17:32

## 2024-04-04 RX ADMIN — CARVEDILOL 25 MG: 12.5 TABLET, FILM COATED ORAL at 10:23

## 2024-04-04 RX ADMIN — PREGABALIN 300 MG: 150 CAPSULE ORAL at 10:17

## 2024-04-04 RX ADMIN — ASPIRIN 81 MG: 81 TABLET, CHEWABLE ORAL at 10:17

## 2024-04-04 ASSESSMENT — PAIN DESCRIPTION - DESCRIPTORS: DESCRIPTORS: ACHING

## 2024-04-04 ASSESSMENT — PAIN SCALES - GENERAL
PAINLEVEL_OUTOF10: 0
PAINLEVEL_OUTOF10: 3

## 2024-04-04 ASSESSMENT — PAIN DESCRIPTION - ORIENTATION: ORIENTATION: LEFT

## 2024-04-04 ASSESSMENT — PAIN DESCRIPTION - LOCATION: LOCATION: ANKLE

## 2024-04-04 NOTE — CARE COORDINATION
Care Management Initial Assessment       RUR: 27% high risk  Readmission? Yes - 3/26/24  1st IM letter given? Yes - 4/3/24  1st  letter given: No    Met with pt at bedside for readmission assessment; pt known to CM from previous admission, when pt presented for a planned surgery then discharged to [facility]. Pt stated she was doing well with rehab, would like to return when discharged to continue her PT/OT rehab; stated she was seated in a chair, forgot to lock it before standing up, lost her balance and fell, which led to her return to [facility]. Pt continues to report good support from  [facility]. Discussed ACP, pt stated she was DNR at one point, is Full Code now, does not completely understand what advance directive would do. CM offered  referral for ACP review, pt stated she would like that conversation, CM to reach out to  services. Pt requested return to [facility] at discharge, CM advised would send referral however [facility] will need to review to determine whether pt continues to be appropriate with this change in care needs, pt verbalized understanding. CM sent referral.     04/04/24 1621   Service Assessment   Patient Orientation Alert and Oriented   Cognition Alert   History Provided By Patient   Primary Caregiver Self   Support Systems Spouse/Significant Other   Patient's Healthcare Decision Maker is: Named in Scanned ACP Document   PCP Verified by CM No  (Pt stated she is looking for a PCP at present)   Prior Functional Level Independent in ADLs/IADLs   Current Functional Level Assistance with the following:;Housework;Shopping;Mobility   Can patient return to prior living arrangement Yes   Ability to make needs known: Good   Family able to assist with home care needs: Yes   Would you like for me to discuss the discharge plan with any other family members/significant others, and if so, who? Yes  ( [facility])   Financial Resources Medicare;Other (Comment)  (MCR A&B, BCBS  was the patient's/caregiver's perception as to why they think they needed to return back to the hospital? Other (Comment)  (Pt stated she forgot to lock her chair before getting up and fell)   Did you visit your Primary Care Physician after you left the hospital, before you returned this time? No   Why weren't you able to visit your PCP? Other (Comment)  (was at [facility])   Did you see a specialist, such as Cardiac, Pulmonary, Orthopedic Physician, etc. after you left the hospital? Yes  (PM&R)   Who advised the patient to return to the hospital? Acute Rehab   Does the patient report anything that got in the way of taking their medications? No   In our efforts to provide the best possible care to you and others like you, can you think of anything that we could have done to help you after you left the hospital the first time, so that you might not have needed to return so soon? Other (Comment)  (Pt stated \"nothing anyone could do, I was just clumsy\")     Corie Espino, [facility]  Care Management  [phone]

## 2024-04-04 NOTE — PROGRESS NOTES
Hospitalist Progress Note    NAME:   Marilee Oakes   : 1950   MRN: [id number]     Date/Time: 2024 11:04 AM  Patient PCP: Monalisa Thorpe LPN    Estimated discharge date: 24  Barriers: needs id recs , needs final blood culture , need to get records from [facility]      Assessment / Plan:  MRSA bacteremia  Recent left ankle open reduction internal fixation  Fever  Lactic acidosis  Patient developed fever while at rehab and blood cultures were ordered which grew MRSA and was sent to [facility]  LAC - 2.33>0.50  Patient underwent a left ankle open reduction into fixation on 3/22 for left ankle fracture. Xray left ankle -  Three views of the left ankle demonstrate interval plate and screw fixation of the fibular fracture with syndesmotic screw. 2 screw fixate the medial malleolar fracture. Fibular fracture demonstrates one half shafts width posterior displacement of the distal fracture fragment. There is less than one half shafts width lateral displacement of the medial malleolar fracture. Bones  are osteopenic. Degenerative changes in the midfoot.    In the emergency department, patient was noted to have small amount of pus coming from left ankle incision according to ER PA which was sent for cultures- LIGHT MRSA   Ortho consulted - advised for NWB , and dressing changes , incisional vac as per wound care .   Ultrasound Doppler of left leg- negative for clots   Infectious disease consulted for further recommendation  Continue vancomycin per pharmacy  Continue contact precaution    CKD stage III  Baseline creatinine is around 1.2-1.4.   Currently creatinine is 1.29.    Check BMP in a.m.  Renally dose medications   Avoid nephrotoxic medications      Chronic respiratory failure due to COPD on 2 L at all times  Obstructive sleep apnea on CPAP  Hypertension  Coronary artery disease  Chronic systolic congestive heart failure s/p AICD  Atrial fibrillation status post watchman's device and  -- 92 18 100 % -- --   04/03/24 2316 (!) 107/58 98.2 °F (36.8 °C) Oral 81 18 99 % -- --   04/03/24 2258 -- -- -- -- -- -- 1.753 m (5' 9\") 102.7 kg (226 lb 6.6 oz)   04/03/24 2245 110/60 98.1 °F (36.7 °C) Oral 80 18 100 % -- --   04/03/24 1830 (!) 155/70 -- -- 80 (!) 0 94 % -- --   04/03/24 1800 106/66 -- -- 80 23 100 % -- --   04/03/24 1730 (!) 148/67 -- -- 80 22 (!) 79 % -- --   04/03/24 1700 (!) 109/91 -- -- 80 26 -- -- --   04/03/24 1630 (!) 144/75 -- -- 82 17 -- -- --   04/03/24 1615 (!) 147/83 -- -- 80 20 97 % -- --   04/03/24 1600 (!) 140/79 -- -- 83 21 100 % -- --   04/03/24 1545 138/72 -- -- 80 18 99 % -- --   04/03/24 1530 (!) 144/80 -- -- 80 24 98 % -- --   04/03/24 1515 (!) 144/74 -- -- 85 24 99 % -- --   04/03/24 1500 (!) 146/76 -- -- 80 23 100 % -- --   04/03/24 1445 139/74 -- -- 80 24 100 % -- --   04/03/24 1430 126/75 -- -- 83 21 96 % -- --   04/03/24 1415 134/69 -- -- 83 22 100 % -- --   04/03/24 1400 139/81 -- -- 83 23 100 % -- --   04/03/24 1300 134/82 -- -- -- -- 100 % -- --   04/03/24 1245 134/62 -- -- -- -- 96 % -- --   04/03/24 1230 128/70 -- -- -- -- 97 % -- --   04/03/24 1224 122/74 98.2 °F (36.8 °C) -- 87 18 98 % -- 109.5 kg (241 lb 6.5 oz)       No intake or output data in the 24 hours ending 04/04/24 1104     I had a face to face encounter and independently examined this patient on 4/4/2024, as outlined below:  PHYSICAL EXAM:  General: Alert, cooperative, on oxygen via nasal cannula  EENT:  EOMI. Anicteric sclerae.  Resp:  CTA bilaterally, no wheezing or rales.  No accessory muscle use  CV:  Irregular rhythm,  No edema.  Ejection systolic murmur on left second intercostal space.  Pacemaker on left upper chest  GI:  Soft, Non distended, Non tender.  +Bowel sounds  Neurologic:  Alert and oriented X 3, normal speech  Psych:   Good insight. Not anxious nor agitated  Skin:  No rashes.  No jaundice  Extremity:       Below-knee posterior slab on left side, capillary refill time normal on toes.
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
## 2024-04-04 NOTE — PROGRESS NOTES
Physician Progress Note      PATIENT:               NEDRA WEBER  CSN #:                  [id number]  :                       1950  ADMIT DATE:       4/3/2024 12:19 PM  DISCH DATE:  RESPONDING  PROVIDER #:        Mayelin Fitzgerald MD          QUERY TEXT:    Pt admitted, noted to have lactic acidosis per H&P. If possible, please   document in the progress notes and discharge summary if you are evaluating   and/or treating any of the following:    \"Lactic acidosis is defined as a serum lactate concentration above 4 mEq/L as   a result of lactic acid production exceeding its clearance by the liver\".      The medical record reflects the following:  Risk Factors: 74 y/o female to ED with wound to ankle. Noted to have elevated   lactic acid and abnormal wound culture.    Clinical Indicators:  H&P: MRSA bacteremia  Recent left ankle open reduction internal fixation  Fever  Lactic acidosis  She does have mild lactic acid of 2.3 but has no leukocytosis or tachycardia.    24 13:16  POC Lactic Acid: 2.33 ()    Wound Culture: LIGHT Preliminary report of Methicillin Resistant   Staphylococcus aureus by antigen detection.      Treatment: Admit, hospitalist consult, Ortho consult, ID Consult, Wound care   consult, Wound culture in ED, ABX: Vancomycin, Contact isolation, vitals per   unit routine.  Options provided:  -- Lactic Acidosis  -- Lactic Acidosis ruled out  -- Other - I will add my own diagnosis  -- Disagree - Not applicable / Not valid  -- Disagree - Clinically unable to determine / Unknown  -- Refer to Clinical Documentation Reviewer    PROVIDER RESPONSE TEXT:    After further study lactic acidosis ruled out.    Query created by: Rilee, Cheryle on 2024 11:49 AM      QUERY TEXT:    Pt admitted with Ankle wound and underwent Left ankle open reduction internal   fixation on 3/22/24 per H&P 4/3/24.?  If possible, please document in progress notes and discharge summary the   relationship if any between
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
## 2024-04-05 LAB
ANION GAP SERPL CALC-SCNC: 4 MMOL/L (ref 5–15)
BACTERIA SPEC CULT: ABNORMAL
BASOPHILS # BLD: 0 K/UL (ref 0–0.1)
BASOPHILS NFR BLD: 0 % (ref 0–1)
BUN SERPL-MCNC: 22 MG/DL (ref 6–20)
BUN/CREAT SERPL: 17 (ref 12–20)
CALCIUM SERPL-MCNC: 9 MG/DL (ref 8.5–10.1)
CHLORIDE SERPL-SCNC: 108 MMOL/L (ref 97–108)
CO2 SERPL-SCNC: 32 MMOL/L (ref 21–32)
CREAT SERPL-MCNC: 1.33 MG/DL (ref 0.55–1.02)
DIFFERENTIAL METHOD BLD: ABNORMAL
EOSINOPHIL # BLD: 0.2 K/UL (ref 0–0.4)
EOSINOPHIL NFR BLD: 4 % (ref 0–7)
ERYTHROCYTE [DISTWIDTH] IN BLOOD BY AUTOMATED COUNT: 17.1 % (ref 11.5–14.5)
GLUCOSE BLD STRIP.AUTO-MCNC: 116 MG/DL (ref 65–117)
GLUCOSE BLD STRIP.AUTO-MCNC: 160 MG/DL (ref 65–117)
GLUCOSE BLD STRIP.AUTO-MCNC: 178 MG/DL (ref 65–117)
GLUCOSE BLD STRIP.AUTO-MCNC: 201 MG/DL (ref 65–117)
GLUCOSE SERPL-MCNC: 128 MG/DL (ref 65–100)
GRAM STN SPEC: ABNORMAL
GRAM STN SPEC: ABNORMAL
HCT VFR BLD AUTO: 30.9 % (ref 35–47)
HGB BLD-MCNC: 8.5 G/DL (ref 11.5–16)
IMM GRANULOCYTES # BLD AUTO: 0.1 K/UL (ref 0–0.04)
IMM GRANULOCYTES NFR BLD AUTO: 2 % (ref 0–0.5)
LYMPHOCYTES # BLD: 0.9 K/UL (ref 0.8–3.5)
LYMPHOCYTES NFR BLD: 15 % (ref 12–49)
MCH RBC QN AUTO: 25.2 PG (ref 26–34)
MCHC RBC AUTO-ENTMCNC: 27.5 G/DL (ref 30–36.5)
MCV RBC AUTO: 91.7 FL (ref 80–99)
MONOCYTES # BLD: 0.5 K/UL (ref 0–1)
MONOCYTES NFR BLD: 8 % (ref 5–13)
NEUTS SEG # BLD: 4.4 K/UL (ref 1.8–8)
NEUTS SEG NFR BLD: 71 % (ref 32–75)
NRBC # BLD: 0 K/UL (ref 0–0.01)
NRBC BLD-RTO: 0 PER 100 WBC
PLATELET # BLD AUTO: 354 K/UL (ref 150–400)
PMV BLD AUTO: 10.8 FL (ref 8.9–12.9)
POTASSIUM SERPL-SCNC: 3.7 MMOL/L (ref 3.5–5.1)
RBC # BLD AUTO: 3.37 M/UL (ref 3.8–5.2)
RBC MORPH BLD: ABNORMAL
SERVICE CMNT-IMP: ABNORMAL
SERVICE CMNT-IMP: NORMAL
SODIUM SERPL-SCNC: 144 MMOL/L (ref 136–145)
VANCOMYCIN SERPL-MCNC: 17.1 UG/ML
WBC # BLD AUTO: 6.1 K/UL (ref 3.6–11)

## 2024-04-05 PROCEDURE — 97530 THERAPEUTIC ACTIVITIES: CPT

## 2024-04-05 PROCEDURE — 6360000002 HC RX W HCPCS: Performed by: INTERNAL MEDICINE

## 2024-04-05 PROCEDURE — 94760 N-INVAS EAR/PLS OXIMETRY 1: CPT

## 2024-04-05 PROCEDURE — 97535 SELF CARE MNGMENT TRAINING: CPT

## 2024-04-05 PROCEDURE — 85025 COMPLETE CBC W/AUTO DIFF WBC: CPT

## 2024-04-05 PROCEDURE — 97162 PT EVAL MOD COMPLEX 30 MIN: CPT

## 2024-04-05 PROCEDURE — 2700000000 HC OXYGEN THERAPY PER DAY

## 2024-04-05 PROCEDURE — 94761 N-INVAS EAR/PLS OXIMETRY MLT: CPT

## 2024-04-05 PROCEDURE — 97165 OT EVAL LOW COMPLEX 30 MIN: CPT

## 2024-04-05 PROCEDURE — 1100000003 HC PRIVATE W/ TELEMETRY

## 2024-04-05 PROCEDURE — 99233 SBSQ HOSP IP/OBS HIGH 50: CPT | Performed by: INTERNAL MEDICINE

## 2024-04-05 PROCEDURE — 6370000000 HC RX 637 (ALT 250 FOR IP): Performed by: STUDENT IN AN ORGANIZED HEALTH CARE EDUCATION/TRAINING PROGRAM

## 2024-04-05 PROCEDURE — 94640 AIRWAY INHALATION TREATMENT: CPT

## 2024-04-05 PROCEDURE — 2580000003 HC RX 258: Performed by: INTERNAL MEDICINE

## 2024-04-05 PROCEDURE — 80202 ASSAY OF VANCOMYCIN: CPT

## 2024-04-05 PROCEDURE — 6370000000 HC RX 637 (ALT 250 FOR IP): Performed by: INTERNAL MEDICINE

## 2024-04-05 PROCEDURE — 80048 BASIC METABOLIC PNL TOTAL CA: CPT

## 2024-04-05 PROCEDURE — 82962 GLUCOSE BLOOD TEST: CPT

## 2024-04-05 PROCEDURE — 6360000002 HC RX W HCPCS: Performed by: STUDENT IN AN ORGANIZED HEALTH CARE EDUCATION/TRAINING PROGRAM

## 2024-04-05 PROCEDURE — 36415 COLL VENOUS BLD VENIPUNCTURE: CPT

## 2024-04-05 RX ADMIN — Medication: at 11:38

## 2024-04-05 RX ADMIN — MUPIROCIN: 20 OINTMENT TOPICAL at 09:52

## 2024-04-05 RX ADMIN — SODIUM CHLORIDE, PRESERVATIVE FREE 10 ML: 5 INJECTION INTRAVENOUS at 09:53

## 2024-04-05 RX ADMIN — Medication: at 09:51

## 2024-04-05 RX ADMIN — PANTOPRAZOLE SODIUM 40 MG: 40 TABLET, DELAYED RELEASE ORAL at 07:00

## 2024-04-05 RX ADMIN — TRAMADOL HYDROCHLORIDE 50 MG: 50 TABLET ORAL at 11:38

## 2024-04-05 RX ADMIN — MUPIROCIN: 20 OINTMENT TOPICAL at 20:53

## 2024-04-05 RX ADMIN — POTASSIUM CHLORIDE 10 MEQ: 1500 TABLET, EXTENDED RELEASE ORAL at 09:50

## 2024-04-05 RX ADMIN — Medication: at 22:34

## 2024-04-05 RX ADMIN — Medication 400 MG: at 20:51

## 2024-04-05 RX ADMIN — Medication 400 MG: at 09:51

## 2024-04-05 RX ADMIN — CARVEDILOL 25 MG: 12.5 TABLET, FILM COATED ORAL at 16:06

## 2024-04-05 RX ADMIN — Medication: at 09:52

## 2024-04-05 RX ADMIN — PREGABALIN 300 MG: 150 CAPSULE ORAL at 09:51

## 2024-04-05 RX ADMIN — CLONAZEPAM 0.5 MG: 0.5 TABLET ORAL at 20:51

## 2024-04-05 RX ADMIN — BUMETANIDE 2 MG: 1 TABLET ORAL at 09:50

## 2024-04-05 RX ADMIN — ISOSORBIDE MONONITRATE 60 MG: 30 TABLET, EXTENDED RELEASE ORAL at 09:50

## 2024-04-05 RX ADMIN — CARVEDILOL 25 MG: 12.5 TABLET, FILM COATED ORAL at 09:50

## 2024-04-05 RX ADMIN — ENOXAPARIN SODIUM 40 MG: 100 INJECTION SUBCUTANEOUS at 09:50

## 2024-04-05 RX ADMIN — SODIUM CHLORIDE, PRESERVATIVE FREE 10 ML: 5 INJECTION INTRAVENOUS at 20:53

## 2024-04-05 RX ADMIN — ASPIRIN 81 MG: 81 TABLET, CHEWABLE ORAL at 09:50

## 2024-04-05 RX ADMIN — VANCOMYCIN HYDROCHLORIDE 1250 MG: 10 INJECTION, POWDER, LYOPHILIZED, FOR SOLUTION INTRAVENOUS at 16:11

## 2024-04-05 RX ADMIN — Medication: at 18:20

## 2024-04-05 RX ADMIN — ARFORMOTEROL TARTRATE: 15 SOLUTION RESPIRATORY (INHALATION) at 07:31

## 2024-04-05 ASSESSMENT — PAIN SCALES - GENERAL
PAINLEVEL_OUTOF10: 3
PAINLEVEL_OUTOF10: 0
PAINLEVEL_OUTOF10: 1
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
## 2024-04-05 NOTE — PROGRESS NOTES
Hospitalist Progress Note    NAME:   Marilee Oakes   : 1950   MRN: [id number]     Date/Time: 2024 10:59 AM  Patient PCP: Monalisa Thorpe LPN    Estimated discharge date: 24  Barriers: needs id recs , needs final blood culture , need to get records from [facility]    Assessment / Plan:  MRSA bacteremia  Recent left ankle open reduction internal fixation  Fever  Lactic acidosis  MRSA bacteremia on  as well     Recently Patient developed fever while at rehab and blood cultures were ordered which grew MRSA and was sent to [facility]  LAC - 2.33>0.50  Patient underwent a left ankle open reduction into fixation on 3/22 for left ankle fracture. Xray left ankle -  Three views of the left ankle demonstrate interval plate and screw fixation of the fibular fracture with syndesmotic screw. 2 screw fixate the medial malleolar fracture. Fibular fracture demonstrates one half shafts width posterior displacement of the distal fracture fragment. There is less than one half shafts width lateral displacement of the medial malleolar fracture. Bones  are osteopenic. Degenerative changes in the midfoot.    In the emergency department, patient was noted to have small amount of pus coming from left ankle incision according to ER PA which was sent for cultures- LIGHT MRSA   Ortho consulted - advised for NWB , and dressing changes , incisional vac as per wound care .   Ultrasound Doppler of left leg- negative for clots   Infectious disease consulted for further recommendation  Continue vancomycin per pharmacy  Continue contact precaution  Will try to get records from [facility] - regarding blood culture     CKD stage III  Baseline creatinine is around 1.2-1.4.   Currently creatinine is 1.33  Check BMP in a.m.  Renally dose medications   Avoid nephrotoxic medications      Chronic respiratory failure due to COPD on 2 L at all times  Obstructive sleep apnea on CPAP  Hypertension  Coronary artery disease  Chronic systolic

## 2024-04-05 NOTE — PROGRESS NOTES
End of Shift Note    Bedside shift change report given to Mayur ALARCON (oncoming nurse) by Fracnine Cantu RN (offgoing nurse).  Report included the following information SBAR, Kardex, Intake/Output, MAR, and Recent Results    Shift worked:  day     Shift summary and any significant changes:     VS stable, pt up with 2 assist gait belt and walker to chair/bsc, prn tramadol given for complaints of pain, voiding clear yellow urine via purewicc, multiple large brown soft and loose bms today     Concerns for physician to address:       Zone phone for oncoming shift:   [phone]       Activity:     Number times ambulated in hallways past shift: 0  Number of times OOB to chair past shift: 0    Cardiac:   Cardiac Monitoring: No           Access:  Current line(s): PIV     Genitourinary:   Urinary status: voiding    Respiratory:      Chronic home O2 use?: YES  Incentive spirometer at bedside: YES       GI:     Current diet:  ADULT DIET; Regular  ADULT ORAL NUTRITION SUPPLEMENT; Breakfast, Lunch, Dinner, AM Snack, PM Snack, HS Snack; Standard High Calorie/High Protein Oral Supplement  Passing flatus: YES  Tolerating current diet: YES       Pain Management:   Patient states pain is manageable on current regimen: YES    Skin:     Interventions: float heels, increase time out of bed, PT/OT consult, limit briefs, and internal/external urinary devices    Patient Safety:  Fall Score:    Interventions: bed/chair alarm, assistive device (walker, cane. etc), gripper socks, pt to call before getting OOB, stay with me (per policy), and gait belt       Length of Stay:  Expected LOS: 5  Actual LOS: 3      Francine Cantu, RN

## 2024-04-05 NOTE — PROGRESS NOTES
Dr. Fitzgerald notified that no current orders are in place for PT/OT eval and treat. Orders given for PT and OT eval and treat.

## 2024-04-05 NOTE — PLAN OF CARE
Problem: Occupational Therapy - Adult  Goal: By Discharge: Performs self-care activities at highest level of function for planned discharge setting.  See evaluation for individualized goals.  Description: FUNCTIONAL STATUS PRIOR TO ADMISSION:  At true baseline, patient generally modified independent with ADLs and functional mobility using rollator. She does benefit from supervision for shower transfers. Patient participates in light home management ADLs. Note recent admission and L ankle ORIF on 3/22 following fracture; patient discharged to [facility].     Receives Help From: Family ( assists with IADLs when needed), ADL Assistance: Independent, Ambulation Assistance: Needs assistance,  , Active : No (active  at baseline, not recently)     HOME SUPPORT: Patient lived with supportive spouse.    Occupational Therapy Goals:  Initiated 4/5/2024  1.  Patient will perform adhere to NWB LLE throughout functional activity/mobility with minimal verbal cues within 7 day(s).  2.  Patient will perform upper body dressing and bathing with Supervision within 7 day(s).  3.  Patient will perform seated lower body bathing using lateral weight-shifting technique with Minimal Assist within 7 day(s).  4.  Patient will perform toilet transfers to bariatric drop-arm [facility] with Supervision / Set-up within 7 day(s).  5.  Patient will perform all aspects of seated toileting with Minimal Assist within 7 day(s).  6.  Patient will participate in upper extremity therapeutic exercise/activities with Supervision for 10 minutes within 7 day(s).    7.  Patient will utilize energy conservation techniques during functional activities with verbal cues within 7 day(s).  Outcome: Progressing   OCCUPATIONAL THERAPY EVALUATION    Patient: Marilee Oakes (73 y.o. female)  Date: 4/5/2024  Primary Diagnosis: Acute systolic heart failure (HCC) [I50.21]  Surgical wound infection [T81.49XA]  MRSA bacteremia [R78.81, B95.62]  Complication of  evaluation is determined to be of the following complexity level: Low

## 2024-04-05 NOTE — PROGRESS NOTES
Infectious Disease progress          Impression    MRSA bacteremia  Blood cultures + for MRSA at [facility]  Details unavailable at this time  Blood cultures 4/3-no growth.    Bimalleolar left ankle fracture  S/p ORIF L/ankle fracture 3/23  Swelling + & serosanguineous drainage at surgical site  S/p incisional wound VAC placement 4/4.  Wound culture 4/3+ for MRSA  Culture reported from drainage at surgical site.    CAD  Acute on chronic systolic CHF  A.fib  Watchmen present.  Retained RV pacing lead+.      CKD 3  Cr 1.33      Diabetes Mellitus 2   On SSI  No recent A1c      COPD  On 2 L  LEELA on CPAP    Plan  Continue Vancomycin IV  Target trough 15-20  Pharmacy to dose per creatinine clearance please  Adequate fluids, daily probiotic  ECHO, pt will need JUAN MANUEL if pacer lead+  Elevate LLE  ID service to follow.    Please contact ID on call with questions, concerns over the weekend.        Extensive review of chart notes, labs, imaging, cultures done  Additionally review of done: Recent reports-Labs, cultures, imaging  D/w -hospitalist, RN  Patient seen today.  Laying in bed.  Denies complaints.    Past Medical History:   Diagnosis Date    Age-related osteoporosis without current pathological fracture     Anxiety and depression 01/22/2018    Arthritis     OA    Asthma     CAD (coronary artery disease)     Chronic systolic heart failure (HCC)     CKD stage G3b/A1, GFR 30-44 and albumin creatinine ratio <30 mg/g (HCC)     Essential hypertension     GERD (gastroesophageal reflux disease)     History of diverticulitis     diverticulitis    History of echocardiogram 11/2021    LV: Estimated LVEF is 40 - 45%. Visually measured ejection fraction. Normal cavity size and wall thickness. Globally reduced systolic function.  LA: Moderately dilated left atrium. Left atrial appendage is completely occluded. A Watchman left atrial appendage occluder is present and completely occludes the appendage.    History of migraine      displaced bimalleolar ankle fracture with overlying soft tissue swelling. The ankle mortise appears grossly intact..      Mildly displaced bimalleolar ankle fracture with overlying soft tissue swelling.      Mariela Marino MD FACP

## 2024-04-05 NOTE — PLAN OF CARE
Problem: Skin/Tissue Integrity  Goal: Absence of new skin breakdown  Description: 1.  Monitor for areas of redness and/or skin breakdown  2.  Assess vascular access sites hourly  3.  Every 4-6 hours minimum:  Change oxygen saturation probe site  4.  Every 4-6 hours:  If on nasal continuous positive airway pressure, respiratory therapy assess nares and determine need for appliance change or resting period.  Outcome: Progressing     Problem: Safety - Adult  Goal: Free from fall injury  Outcome: Progressing  Flowsheets (Taken 4/5/2024 0027)  Free From Fall Injury:   Instruct family/caregiver on patient safety   Based on caregiver fall risk screen, instruct family/caregiver to ask for assistance with transferring infant if caregiver noted to have fall risk factors     Problem: ABCDS Injury Assessment  Goal: Absence of physical injury  Outcome: Progressing  Flowsheets (Taken 4/5/2024 0027)  Absence of Physical Injury: Implement safety measures based on patient assessment     Problem: Chronic Conditions and Co-morbidities  Goal: Patient's chronic conditions and co-morbidity symptoms are monitored and maintained or improved  Outcome: Progressing  Flowsheets (Taken 4/5/2024 0027)  Care Plan - Patient's Chronic Conditions and Co-Morbidity Symptoms are Monitored and Maintained or Improved: Monitor and assess patient's chronic conditions and comorbid symptoms for stability, deterioration, or improvement     Problem: Pain  Goal: Verbalizes/displays adequate comfort level or baseline comfort level  Outcome: Progressing  Flowsheets (Taken 4/5/2024 0027)  Verbalizes/displays adequate comfort level or baseline comfort level:   Encourage patient to monitor pain and request assistance   Assess pain using appropriate pain scale   Administer analgesics based on type and severity of pain and evaluate response   Implement non-pharmacological measures as appropriate and evaluate response   Consider cultural and social influences on pain

## 2024-04-05 NOTE — PROGRESS NOTES
Pharmacy Antimicrobial Kinetic Dosing    Indication for Antimicrobials: bloodstream infection (hx MRSA bacteremia)    Current Regimen of Each Antimicrobial:  Vancomycin 1250mg IV q24h; Start Date 4/3; Day # 3    Previous Antimicrobial Therapy:     Goal Level: Vancomycin -600    Date Dose & Interval Measured (mcg/mL) Predicted AUC    15:00 1250mg IV q24h 17.1 616                 Significant Cultures:   4/3: blood: ngtd  4/3: wound: MRSA    Labs:  Recent Labs     Units 24  0417 24  0624 24  1315   CREATININE MG/DL 1.33* 1.29* 1.40*   BUN MG/DL 22* 26* 30*   WBC K/uL 6.1 6.6 6.7     Temp (24hrs), Av.6 °F (37 °C), Min:97.7 °F (36.5 °C), Max:99.5 °F (37.5 °C)    Conditions for Dosing Consideration: None    Creatinine Clearance (mL/min): Estimated Creatinine Clearance: 49 mL/min (A) (based on SCr of 1.33 mg/dL (H)).     Impression/Plan:   Vancomycin level resulted, AUC calculated at 616. Will reduce dose to 1000 mg IV Q24H, expected   Antimicrobial stop date to be determined     Pharmacy will follow daily and adjust medications as appropriate for renal function and/or serum levels.    Thank you,  Sterling Stout, [facility]

## 2024-04-05 NOTE — PLAN OF CARE
Problem: Physical Therapy - Adult  Goal: By Discharge: Performs mobility at highest level of function for planned discharge setting.  See evaluation for individualized goals.  Description: FUNCTIONAL STATUS PRIOR TO ADMISSION: Pt comes from IPR at [facility] s/p having fall with L ankle fx ORIF done on 3/23/24; now to ED from rehab with fever, MRSA and now with wound vac L ankle; pt is NWB and was working on scooting, lateral transfers, and standing at rehab to this point    HOME SUPPORT PRIOR TO ADMISSION: was at [facility]    Physical Therapy Goals  Initiated 4/5/2024  1.  Patient will move from supine to sit and sit to supine, scoot up and down, and roll side to side in bed with supervision/set-up within 7 day(s).    2.  Patient will perform sit to stand with moderate assistance within 7 day(s).  3.  Patient will transfer from bed to chair and chair to bed with contact guard assist using the least restrictive device/lateral scoot within 7 day(s).      Outcome: Progressing   PHYSICAL THERAPY EVALUATION    Patient: Marilee Oakes (73 y.o. female)  Date: 4/5/2024  Primary Diagnosis: Acute systolic heart failure (HCC) [I50.21]  Surgical wound infection [T81.49XA]  MRSA bacteremia [R78.81, B95.62]  Complication of procedure, initial encounter [T81.9XXA]       Precautions: Restrictions/Precautions: Contact Precautions, Other (comment), Weight Bearing, Fall Risk, Bed Alarm (MRSA, now with L ankle wound vac)  Required Braces or Orthoses?: Yes (L ankle with dressing and splint) Required Braces or Orthoses?: Yes (L ankle with dressing and splint) Lower Extremity Weight Bearing Restrictions  Left Lower Extremity Weight Bearing: Non Weight Bearing                  ASSESSMENT :   DEFICITS/IMPAIRMENTS:   The patient is limited by decreased functional mobility, independence in ADLs, strength, activity tolerance adm with HF and MRSA at ankle now with wound vac; was at [facility] after L ankle fx ORIF Mar 23, 2024. Pt is CGA/min A for bed  assistance  Scooting: Stand-by assistance  Transfers:     Transfer Training  Transfer Training: Yes  Interventions: Manual cues;Safety awareness training;Tactile cues;Verbal cues  Sit to Stand: Moderate assistance;Maximum assistance;Assist X2  Stand to Sit: Moderate assistance;Maximum assistance;Assist X2  Bed to Chair: Other (comment) (scooted well at edge of bed, too fatigued to then try to chair but has ability to scoot with CGA laterally with drop arm to recliner)  Balance:               Balance  Sitting: Intact  Standing: Impaired  Standing - Static: Constant support;Poor (difficulty with NWB, improved after initial transition but also difficulty attaining upright posture)  Standing - Dynamic: Not tested  Ambulation/Gait Training:                       Gait  Gait Training: No                                                                                                                                                                                                                                                 [facility]      Basic Mobility Inpatient Short Form (6-Clicks) Version 2  How much HELP from another person do you currently need... (If the patient hasn't done an activity recently, how much help from another person do you think they would need if they tried?) Total A Lot A Little None   1.  Turning from your back to your side while in a flat bed without using bedrails? []  1 []  2 [x]  3  []  4   2.  Moving from lying on your back to sitting on the side of a flat bed without using bedrails? []  1 []  2 [x]  3  []  4   3.  Moving to and from a bed to a chair (including a wheelchair)? []  1 [x]  2 []  3  []  4   4. Standing up from a chair using your arms (e.g. wheelchair or bedside chair)? []  1 [x]  2 []  3  []  4   5.  Walking in hospital room? [x]  1 []  2 []  3  []  4   6.  Climbing 3-5 steps with a railing? [x]  1 []  2 []  3  []  4     Raw Score: 12/24

## 2024-04-05 NOTE — PROGRESS NOTES
Visited patient to assess learning needs r/t medication side effects    Learning readiness assessment:   Patient will   [x] Accept teaching  [] Will not accept teaching  [] Is willing to learn    Verbatims  Preferred method to learn: Explanation  Goals to learn: Describe use and side effect of medication  Motivation to learn: Wants to remain out of hospital    Review of positive medication side effects:   [] Lightheadedness  [] Dizziness  [] Nausea  [] Vomiting  [] Constipation  [] Diarrhea    Factors affecting learning:  [x] Cognition - may be forgetful  [x] Language: English  [x] Engagement in subject - engaged  [x] Social  - family helps memory    Learning needs  Patient needs assistance with:   [x] Understanding of condition   [x] Understanding of restrictions  [x] Understanding of treatment  [x] Understanding of medication     Demonstrates learning:   [x] Describes medication names - Tramadol   [x] Recognizes medication names- Tramadol  [] Relates treatment to condition   [x] Recalls a medication side effect - Tramadol (constipation)      LYNNE-I Nursing Dx   Readiness to Learn about Medication   Nursing interventions  Checks indicate the need to implement.     [x] Focus on these medication classes: Antibiotics and Bronchodialator Inhaler  [x] Have patient identify medication while taking them  [x]        Review printed medication education material  [x]        Incorporate teaching with caregiver or family member  []        Ask if pt can use medication from home:   [] Have patient demonstrate correct use of shot/inhaler  []        Not Applicable                                                                                   Referral   [] Glucose Management  [] Stroke care/teaching  []        Spiritual Care  [] Dietician  []        Pharmacy for redosing/retiming/or formulary med  []        Music Therapy  [] Case Management - for drug/alcohol cessation resources  [x]        Not Applicable

## 2024-04-05 NOTE — PROGRESS NOTES
Ortho:    Incisional vac fitted with wound care  leo-incisional erythema resolved  Well padded splint re-fitted      Continue strict NWB---- after today's conversation with the pt, I believe she has been unable to maintain post op WB recommendations while participating with therapy at [facility]    Initial cultures-- MRSA--- although those with taken from the skin  ID consulting for ABX recommendations    Pt remains stable- afebrile, no WBC--- NO indication for urgent sx treatment at this time    Will follow-    Mike Vee PA-C

## 2024-04-05 NOTE — PROGRESS NOTES
End of Shift Note    Bedside shift change report given to AGNES Escamilla (oncoming nurse) by Yared Odom RN (offgoing nurse).  Report included the following information SBAR, Procedure Summary, Intake/Output, MAR, and Recent Results    Shift worked:  night     Shift summary and any significant changes:     VS stable, pt c/o minimal pain, pt is voiding with the purewick while in bed, pt on bedrest and is NWB to LLE.      Concerns for physician to address:  See above     Zone phone for oncoming shift:   [phone]     Activity:     Number times ambulated in hallways past shift: 0  Number of times OOB to chair past shift: 0    Cardiac:   Cardiac Monitoring: No           Access:  Current line(s): PIV     Genitourinary:   Urinary status: voiding and external catheter    Respiratory:      Chronic home O2 use?: NO  Incentive spirometer at bedside: YES       GI:     Current diet:  ADULT DIET; Regular  Passing flatus: YES  Tolerating current diet: YES       Pain Management:   Patient states pain is manageable on current regimen: YES    Skin:     Interventions: PT/OT consult and internal/external urinary devices    Patient Safety:  Fall Score:    Interventions: bed/chair alarm, assistive device (walker, cane. etc), gripper socks, pt to call before getting OOB, and stay with me (per policy)       Length of Stay:  Expected LOS: 3  Actual LOS: 2      Yared Odom, RN

## 2024-04-06 LAB
ANION GAP SERPL CALC-SCNC: 1 MMOL/L (ref 5–15)
BASOPHILS # BLD: 0.1 K/UL (ref 0–0.1)
BASOPHILS NFR BLD: 1 % (ref 0–1)
BUN SERPL-MCNC: 26 MG/DL (ref 6–20)
BUN/CREAT SERPL: 20 (ref 12–20)
CALCIUM SERPL-MCNC: 9 MG/DL (ref 8.5–10.1)
CHLORIDE SERPL-SCNC: 105 MMOL/L (ref 97–108)
CO2 SERPL-SCNC: 34 MMOL/L (ref 21–32)
CREAT SERPL-MCNC: 1.3 MG/DL (ref 0.55–1.02)
DIFFERENTIAL METHOD BLD: ABNORMAL
EOSINOPHIL # BLD: 0.3 K/UL (ref 0–0.4)
EOSINOPHIL NFR BLD: 5 % (ref 0–7)
ERYTHROCYTE [DISTWIDTH] IN BLOOD BY AUTOMATED COUNT: 17.1 % (ref 11.5–14.5)
EST. AVERAGE GLUCOSE BLD GHB EST-MCNC: 120 MG/DL
GLUCOSE BLD STRIP.AUTO-MCNC: 136 MG/DL (ref 65–117)
GLUCOSE BLD STRIP.AUTO-MCNC: 153 MG/DL (ref 65–117)
GLUCOSE BLD STRIP.AUTO-MCNC: 181 MG/DL (ref 65–117)
GLUCOSE BLD STRIP.AUTO-MCNC: 249 MG/DL (ref 65–117)
GLUCOSE SERPL-MCNC: 143 MG/DL (ref 65–100)
HBA1C MFR BLD: 5.8 % (ref 4–5.6)
HCT VFR BLD AUTO: 29.6 % (ref 35–47)
HGB BLD-MCNC: 8.2 G/DL (ref 11.5–16)
IMM GRANULOCYTES # BLD AUTO: 0.1 K/UL (ref 0–0.04)
IMM GRANULOCYTES NFR BLD AUTO: 1 % (ref 0–0.5)
LYMPHOCYTES # BLD: 0.8 K/UL (ref 0.8–3.5)
LYMPHOCYTES NFR BLD: 13 % (ref 12–49)
MCH RBC QN AUTO: 24.6 PG (ref 26–34)
MCHC RBC AUTO-ENTMCNC: 27.7 G/DL (ref 30–36.5)
MCV RBC AUTO: 88.6 FL (ref 80–99)
MONOCYTES # BLD: 0.5 K/UL (ref 0–1)
MONOCYTES NFR BLD: 8 % (ref 5–13)
NEUTS SEG # BLD: 4.7 K/UL (ref 1.8–8)
NEUTS SEG NFR BLD: 72 % (ref 32–75)
NRBC # BLD: 0 K/UL (ref 0–0.01)
NRBC BLD-RTO: 0 PER 100 WBC
PLATELET # BLD AUTO: 451 K/UL (ref 150–400)
PMV BLD AUTO: 10.5 FL (ref 8.9–12.9)
POTASSIUM SERPL-SCNC: 3.6 MMOL/L (ref 3.5–5.1)
RBC # BLD AUTO: 3.34 M/UL (ref 3.8–5.2)
RBC MORPH BLD: ABNORMAL
SERVICE CMNT-IMP: ABNORMAL
SODIUM SERPL-SCNC: 140 MMOL/L (ref 136–145)
WBC # BLD AUTO: 6.5 K/UL (ref 3.6–11)

## 2024-04-06 PROCEDURE — 83036 HEMOGLOBIN GLYCOSYLATED A1C: CPT

## 2024-04-06 PROCEDURE — 2580000003 HC RX 258: Performed by: STUDENT IN AN ORGANIZED HEALTH CARE EDUCATION/TRAINING PROGRAM

## 2024-04-06 PROCEDURE — 94760 N-INVAS EAR/PLS OXIMETRY 1: CPT

## 2024-04-06 PROCEDURE — 2700000000 HC OXYGEN THERAPY PER DAY

## 2024-04-06 PROCEDURE — 6370000000 HC RX 637 (ALT 250 FOR IP): Performed by: INTERNAL MEDICINE

## 2024-04-06 PROCEDURE — 2580000003 HC RX 258: Performed by: INTERNAL MEDICINE

## 2024-04-06 PROCEDURE — 6360000002 HC RX W HCPCS: Performed by: STUDENT IN AN ORGANIZED HEALTH CARE EDUCATION/TRAINING PROGRAM

## 2024-04-06 PROCEDURE — 82962 GLUCOSE BLOOD TEST: CPT

## 2024-04-06 PROCEDURE — 1100000003 HC PRIVATE W/ TELEMETRY

## 2024-04-06 PROCEDURE — 94640 AIRWAY INHALATION TREATMENT: CPT

## 2024-04-06 PROCEDURE — 85025 COMPLETE CBC W/AUTO DIFF WBC: CPT

## 2024-04-06 PROCEDURE — 6360000002 HC RX W HCPCS: Performed by: INTERNAL MEDICINE

## 2024-04-06 PROCEDURE — 80048 BASIC METABOLIC PNL TOTAL CA: CPT

## 2024-04-06 PROCEDURE — 36415 COLL VENOUS BLD VENIPUNCTURE: CPT

## 2024-04-06 RX ADMIN — ISOSORBIDE MONONITRATE 60 MG: 30 TABLET, EXTENDED RELEASE ORAL at 09:04

## 2024-04-06 RX ADMIN — MUPIROCIN: 20 OINTMENT TOPICAL at 09:12

## 2024-04-06 RX ADMIN — Medication 400 MG: at 20:33

## 2024-04-06 RX ADMIN — TRAMADOL HYDROCHLORIDE 50 MG: 50 TABLET ORAL at 10:57

## 2024-04-06 RX ADMIN — VANCOMYCIN HYDROCHLORIDE 1000 MG: 1 INJECTION, POWDER, LYOPHILIZED, FOR SOLUTION INTRAVENOUS at 15:24

## 2024-04-06 RX ADMIN — Medication: at 11:28

## 2024-04-06 RX ADMIN — MUPIROCIN: 20 OINTMENT TOPICAL at 20:39

## 2024-04-06 RX ADMIN — TRAMADOL HYDROCHLORIDE 50 MG: 50 TABLET ORAL at 20:37

## 2024-04-06 RX ADMIN — Medication 400 MG: at 09:05

## 2024-04-06 RX ADMIN — ENOXAPARIN SODIUM 40 MG: 100 INJECTION SUBCUTANEOUS at 09:04

## 2024-04-06 RX ADMIN — Medication: at 09:06

## 2024-04-06 RX ADMIN — SODIUM CHLORIDE, PRESERVATIVE FREE 10 ML: 5 INJECTION INTRAVENOUS at 09:12

## 2024-04-06 RX ADMIN — POTASSIUM CHLORIDE 10 MEQ: 1500 TABLET, EXTENDED RELEASE ORAL at 09:04

## 2024-04-06 RX ADMIN — CARVEDILOL 25 MG: 12.5 TABLET, FILM COATED ORAL at 18:20

## 2024-04-06 RX ADMIN — ARFORMOTEROL TARTRATE: 15 SOLUTION RESPIRATORY (INHALATION) at 08:13

## 2024-04-06 RX ADMIN — Medication: at 20:40

## 2024-04-06 RX ADMIN — PREGABALIN 300 MG: 150 CAPSULE ORAL at 09:04

## 2024-04-06 RX ADMIN — CLONAZEPAM 0.5 MG: 0.5 TABLET ORAL at 20:33

## 2024-04-06 RX ADMIN — BUMETANIDE 2 MG: 1 TABLET ORAL at 09:05

## 2024-04-06 RX ADMIN — Medication: at 18:20

## 2024-04-06 RX ADMIN — ASPIRIN 81 MG: 81 TABLET, CHEWABLE ORAL at 09:05

## 2024-04-06 RX ADMIN — PANTOPRAZOLE SODIUM 40 MG: 40 TABLET, DELAYED RELEASE ORAL at 05:54

## 2024-04-06 RX ADMIN — SODIUM CHLORIDE, PRESERVATIVE FREE 10 ML: 5 INJECTION INTRAVENOUS at 20:33

## 2024-04-06 RX ADMIN — INSULIN LISPRO 1 UNITS: 100 INJECTION, SOLUTION INTRAVENOUS; SUBCUTANEOUS at 12:16

## 2024-04-06 RX ADMIN — CARVEDILOL 25 MG: 12.5 TABLET, FILM COATED ORAL at 09:05

## 2024-04-06 ASSESSMENT — PAIN - FUNCTIONAL ASSESSMENT
PAIN_FUNCTIONAL_ASSESSMENT: ACTIVITIES ARE NOT PREVENTED
PAIN_FUNCTIONAL_ASSESSMENT: PREVENTS OR INTERFERES SOME ACTIVE ACTIVITIES AND ADLS
PAIN_FUNCTIONAL_ASSESSMENT: PREVENTS OR INTERFERES SOME ACTIVE ACTIVITIES AND ADLS

## 2024-04-06 ASSESSMENT — PAIN SCALES - GENERAL
PAINLEVEL_OUTOF10: 7
PAINLEVEL_OUTOF10: 2
PAINLEVEL_OUTOF10: 4
PAINLEVEL_OUTOF10: 6
PAINLEVEL_OUTOF10: 0

## 2024-04-06 ASSESSMENT — PAIN DESCRIPTION - ONSET
ONSET: SUDDEN
ONSET: SUDDEN

## 2024-04-06 ASSESSMENT — PAIN DESCRIPTION - ORIENTATION
ORIENTATION: LEFT

## 2024-04-06 ASSESSMENT — PAIN DESCRIPTION - LOCATION
LOCATION: LEG
LOCATION: FOOT;TOE (COMMENT WHICH ONE)
LOCATION: LEG

## 2024-04-06 ASSESSMENT — PAIN DESCRIPTION - DESCRIPTORS
DESCRIPTORS: ACHING;SORE
DESCRIPTORS: ACHING
DESCRIPTORS: ACHING

## 2024-04-06 ASSESSMENT — PAIN DESCRIPTION - FREQUENCY
FREQUENCY: INTERMITTENT
FREQUENCY: INTERMITTENT

## 2024-04-06 ASSESSMENT — PAIN DESCRIPTION - PAIN TYPE
TYPE: ACUTE PAIN
TYPE: ACUTE PAIN

## 2024-04-06 NOTE — PROGRESS NOTES
End of Shift Note    Bedside shift change report given to Mayur ALARCON (oncoming nurse) by Francine Cantu RN (offgoing nurse).  Report included the following information SBAR, Kardex, Intake/Output, MAR, and Recent Results    Shift worked:  day     Shift summary and any significant changes:     VS stable, pt up with at least 2 assist gait belt and walker to chair/bsc, prn tramadol given for complaints of pain, voiding clear yellow urine via purewicc, large brown soft and loose bm today     Concerns for physician to address:       Zone phone for oncoming shift:   [phone]       Activity:     Number times ambulated in hallways past shift: 0  Number of times OOB to chair past shift: 0    Cardiac:   Cardiac Monitoring: No           Access:  Current line(s): PIV     Genitourinary:   Urinary status: voiding    Respiratory:      Chronic home O2 use?: YES  Incentive spirometer at bedside: YES       GI:     Current diet:  ADULT DIET; Regular  ADULT ORAL NUTRITION SUPPLEMENT; Breakfast, Lunch, Dinner, AM Snack, PM Snack, HS Snack; Standard High Calorie/High Protein Oral Supplement  Passing flatus: YES  Tolerating current diet: YES       Pain Management:   Patient states pain is manageable on current regimen: YES    Skin:     Interventions: float heels, increase time out of bed, PT/OT consult, limit briefs, and internal/external urinary devices    Patient Safety:  Fall Score:    Interventions: bed/chair alarm, assistive device (walker, cane. etc), gripper socks, pt to call before getting OOB, stay with me (per policy), and gait belt       Length of Stay:  Expected LOS: 5  Actual LOS: 3      Francine Cantu, RN

## 2024-04-06 NOTE — PLAN OF CARE
Problem: Skin/Tissue Integrity  Goal: Absence of new skin breakdown  Description: 1.  Monitor for areas of redness and/or skin breakdown  2.  Assess vascular access sites hourly  3.  Every 4-6 hours minimum:  Change oxygen saturation probe site  4.  Every 4-6 hours:  If on nasal continuous positive airway pressure, respiratory therapy assess nares and determine need for appliance change or resting period.  Outcome: Progressing     Problem: Safety - Adult  Goal: Free from fall injury  Outcome: Progressing     Problem: ABCDS Injury Assessment  Goal: Absence of physical injury  Outcome: Progressing     Problem: Chronic Conditions and Co-morbidities  Goal: Patient's chronic conditions and co-morbidity symptoms are monitored and maintained or improved  Outcome: Progressing     Problem: Pain  Goal: Verbalizes/displays adequate comfort level or baseline comfort level  Outcome: Progressing     Problem: Respiratory - Adult  Goal: Achieves optimal ventilation and oxygenation  Outcome: Progressing     Problem: Physical Therapy - Adult  Goal: By Discharge: Performs mobility at highest level of function for planned discharge setting.  See evaluation for individualized goals.  Description: FUNCTIONAL STATUS PRIOR TO ADMISSION: Pt comes from [facility] at [facility] s/p having fall with L ankle fx ORIF done on 3/23/24; now to ED from rehab with fever, MRSA and now with wound vac L ankle; pt is NWB and was working on scooting, lateral transfers, and standing at rehab to this point    HOME SUPPORT PRIOR TO ADMISSION: was at [facility]    Physical Therapy Goals  Initiated 4/5/2024  1.  Patient will move from supine to sit and sit to supine, scoot up and down, and roll side to side in bed with supervision/set-up within 7 day(s).    2.  Patient will perform sit to stand with moderate assistance within 7 day(s).  3.  Patient will transfer from bed to chair and chair to bed with contact guard assist using the least restrictive device/lateral scoot

## 2024-04-06 NOTE — PLAN OF CARE
Problem: Skin/Tissue Integrity  Goal: Absence of new skin breakdown  Description: 1.  Monitor for areas of redness and/or skin breakdown  2.  Assess vascular access sites hourly  3.  Every 4-6 hours minimum:  Change oxygen saturation probe site  4.  Every 4-6 hours:  If on nasal continuous positive airway pressure, respiratory therapy assess nares and determine need for appliance change or resting period.  4/6/2024 1038 by Francine Cantu RN  Outcome: Progressing  4/5/2024 2232 by Mayur Hurley RN  Outcome: Progressing     Problem: Safety - Adult  Goal: Free from fall injury  4/6/2024 1038 by Francine Cantu RN  Outcome: Progressing  4/5/2024 2232 by Mayur Hurley RN  Outcome: Progressing     Problem: ABCDS Injury Assessment  Goal: Absence of physical injury  4/6/2024 1038 by Francine Cantu RN  Outcome: Progressing  4/5/2024 2232 by Mayur Hurley RN  Outcome: Progressing     Problem: Chronic Conditions and Co-morbidities  Goal: Patient's chronic conditions and co-morbidity symptoms are monitored and maintained or improved  4/6/2024 1038 by Francine Cantu RN  Outcome: Progressing  4/5/2024 2232 by Mayur Hurley RN  Outcome: Progressing     Problem: Pain  Goal: Verbalizes/displays adequate comfort level or baseline comfort level  4/6/2024 1038 by Francine Cantu RN  Outcome: Progressing  4/5/2024 2232 by Mayur Hurley RN  Outcome: Progressing     Problem: Respiratory - Adult  Goal: Achieves optimal ventilation and oxygenation  4/6/2024 1038 by Francine Cantu RN  Outcome: Progressing  4/6/2024 0814 by Preston Mcdonough, RT  Outcome: Progressing  4/5/2024 2232 by Mayur Hurley RN  Outcome: Progressing

## 2024-04-06 NOTE — PROGRESS NOTES
Hospitalist Progress Note    NAME:   Marilee Oakes   : 1950   MRN: [id number]     Date/Time: 2024 11:57 AM  Patient PCP: Monalisa Thorpe LPN    Estimated discharge date: 24  Barriers: needs id recs , needs final blood culture     Assessment / Plan:  MRSA bacteremia  Recent left ankle open reduction internal fixation  Fever  Lactic acidosis  MRSA bacteremia on  as well     Recently Patient developed fever while at rehab and blood cultures were ordered which grew MRSA and was sent to [facility]  LAC - 2.33>0.50  MRSA screen +   Patient underwent a left ankle open reduction into fixation on 3/22 for left ankle fracture. Xray left ankle -  Three views of the left ankle demonstrate interval plate and screw fixation of the fibular fracture with syndesmotic screw. 2 screw fixate the medial malleolar fracture. Fibular fracture demonstrates one half shafts width posterior displacement of the distal fracture fragment. There is less than one half shafts width lateral displacement of the medial malleolar fracture. Bones  are osteopenic. Degenerative changes in the midfoot.    In the emergency department, patient was noted to have small amount of pus coming from left ankle incision according to ER PA which was sent for cultures- LIGHT MRSA   Ortho consulted - advised for NWB , and dressing changes , incisional vac as per wound care .   Ultrasound Doppler of left leg- negative for clots   Infectious disease consulted-  Continue vancomycin per pharmacy, Continue contact precaution.   Patient waiting echo.     CKD stage III  Baseline creatinine is around 1.2-1.4.   Currently creatinine is 1.30  Check BMP in a.m.  Renally dose medications   Avoid nephrotoxic medications      Chronic respiratory failure due to COPD on 2 L at all times  Obstructive sleep apnea on CPAP  Hypertension  Coronary artery disease  Chronic systolic congestive heart failure s/p AICD  Atrial fibrillation status post watchman's device

## 2024-04-07 PROBLEM — T81.49XA INFECTION OF POSTOPERATIVE WOUND DUE TO METHICILLIN-RESISTANT STAPHYLOCOCCUS AUREUS: Status: ACTIVE | Noted: 2024-04-07

## 2024-04-07 PROBLEM — T81.49XA SURGICAL WOUND INFECTION: Status: ACTIVE | Noted: 2024-04-07

## 2024-04-07 PROBLEM — B95.62 INFECTION OF POSTOPERATIVE WOUND DUE TO METHICILLIN-RESISTANT STAPHYLOCOCCUS AUREUS: Status: ACTIVE | Noted: 2024-04-07

## 2024-04-07 PROBLEM — J41.0 SIMPLE CHRONIC BRONCHITIS (HCC): Status: ACTIVE | Noted: 2024-04-07

## 2024-04-07 LAB
ANION GAP SERPL CALC-SCNC: 1 MMOL/L (ref 5–15)
BASOPHILS # BLD: 0 K/UL (ref 0–0.1)
BASOPHILS NFR BLD: 0 % (ref 0–1)
BUN SERPL-MCNC: 27 MG/DL (ref 6–20)
BUN/CREAT SERPL: 21 (ref 12–20)
CALCIUM SERPL-MCNC: 9 MG/DL (ref 8.5–10.1)
CHLORIDE SERPL-SCNC: 106 MMOL/L (ref 97–108)
CO2 SERPL-SCNC: 35 MMOL/L (ref 21–32)
CREAT SERPL-MCNC: 1.26 MG/DL (ref 0.55–1.02)
DIFFERENTIAL METHOD BLD: ABNORMAL
EOSINOPHIL # BLD: 0.3 K/UL (ref 0–0.4)
EOSINOPHIL NFR BLD: 6 % (ref 0–7)
ERYTHROCYTE [DISTWIDTH] IN BLOOD BY AUTOMATED COUNT: 17 % (ref 11.5–14.5)
GLUCOSE BLD STRIP.AUTO-MCNC: 134 MG/DL (ref 65–117)
GLUCOSE BLD STRIP.AUTO-MCNC: 175 MG/DL (ref 65–117)
GLUCOSE BLD STRIP.AUTO-MCNC: 208 MG/DL (ref 65–117)
GLUCOSE BLD STRIP.AUTO-MCNC: 226 MG/DL (ref 65–117)
GLUCOSE SERPL-MCNC: 140 MG/DL (ref 65–100)
HCT VFR BLD AUTO: 29.9 % (ref 35–47)
HGB BLD-MCNC: 8.3 G/DL (ref 11.5–16)
IMM GRANULOCYTES # BLD AUTO: 0.1 K/UL (ref 0–0.04)
IMM GRANULOCYTES NFR BLD AUTO: 1 % (ref 0–0.5)
LYMPHOCYTES # BLD: 0.8 K/UL (ref 0.8–3.5)
LYMPHOCYTES NFR BLD: 14 % (ref 12–49)
MCH RBC QN AUTO: 24.7 PG (ref 26–34)
MCHC RBC AUTO-ENTMCNC: 27.8 G/DL (ref 30–36.5)
MCV RBC AUTO: 89 FL (ref 80–99)
MONOCYTES # BLD: 0.4 K/UL (ref 0–1)
MONOCYTES NFR BLD: 7 % (ref 5–13)
NEUTS SEG # BLD: 4.1 K/UL (ref 1.8–8)
NEUTS SEG NFR BLD: 72 % (ref 32–75)
NRBC # BLD: 0 K/UL (ref 0–0.01)
NRBC BLD-RTO: 0 PER 100 WBC
PLATELET # BLD AUTO: 442 K/UL (ref 150–400)
POTASSIUM SERPL-SCNC: 3.7 MMOL/L (ref 3.5–5.1)
RBC # BLD AUTO: 3.36 M/UL (ref 3.8–5.2)
RBC MORPH BLD: ABNORMAL
SERVICE CMNT-IMP: ABNORMAL
SODIUM SERPL-SCNC: 142 MMOL/L (ref 136–145)
VANCOMYCIN SERPL-MCNC: 15.9 UG/ML
WBC # BLD AUTO: 5.7 K/UL (ref 3.6–11)

## 2024-04-07 PROCEDURE — 6360000002 HC RX W HCPCS: Performed by: STUDENT IN AN ORGANIZED HEALTH CARE EDUCATION/TRAINING PROGRAM

## 2024-04-07 PROCEDURE — 6370000000 HC RX 637 (ALT 250 FOR IP): Performed by: INTERNAL MEDICINE

## 2024-04-07 PROCEDURE — 85025 COMPLETE CBC W/AUTO DIFF WBC: CPT

## 2024-04-07 PROCEDURE — 80202 ASSAY OF VANCOMYCIN: CPT

## 2024-04-07 PROCEDURE — 82962 GLUCOSE BLOOD TEST: CPT

## 2024-04-07 PROCEDURE — 2580000003 HC RX 258: Performed by: INTERNAL MEDICINE

## 2024-04-07 PROCEDURE — 1100000003 HC PRIVATE W/ TELEMETRY

## 2024-04-07 PROCEDURE — 2580000003 HC RX 258: Performed by: STUDENT IN AN ORGANIZED HEALTH CARE EDUCATION/TRAINING PROGRAM

## 2024-04-07 PROCEDURE — 94760 N-INVAS EAR/PLS OXIMETRY 1: CPT

## 2024-04-07 PROCEDURE — 80048 BASIC METABOLIC PNL TOTAL CA: CPT

## 2024-04-07 PROCEDURE — 94761 N-INVAS EAR/PLS OXIMETRY MLT: CPT

## 2024-04-07 PROCEDURE — 2700000000 HC OXYGEN THERAPY PER DAY

## 2024-04-07 PROCEDURE — 36415 COLL VENOUS BLD VENIPUNCTURE: CPT

## 2024-04-07 RX ADMIN — TRAMADOL HYDROCHLORIDE 50 MG: 50 TABLET ORAL at 14:01

## 2024-04-07 RX ADMIN — VANCOMYCIN HYDROCHLORIDE 1000 MG: 1 INJECTION, POWDER, LYOPHILIZED, FOR SOLUTION INTRAVENOUS at 14:03

## 2024-04-07 RX ADMIN — SODIUM CHLORIDE, PRESERVATIVE FREE 10 ML: 5 INJECTION INTRAVENOUS at 08:38

## 2024-04-07 RX ADMIN — Medication 400 MG: at 20:09

## 2024-04-07 RX ADMIN — PANTOPRAZOLE SODIUM 40 MG: 40 TABLET, DELAYED RELEASE ORAL at 05:13

## 2024-04-07 RX ADMIN — POTASSIUM CHLORIDE 10 MEQ: 1500 TABLET, EXTENDED RELEASE ORAL at 08:37

## 2024-04-07 RX ADMIN — PREGABALIN 300 MG: 150 CAPSULE ORAL at 08:37

## 2024-04-07 RX ADMIN — Medication: at 08:38

## 2024-04-07 RX ADMIN — CLONAZEPAM 0.5 MG: 0.5 TABLET ORAL at 20:09

## 2024-04-07 RX ADMIN — CARVEDILOL 25 MG: 12.5 TABLET, FILM COATED ORAL at 16:58

## 2024-04-07 RX ADMIN — ASPIRIN 81 MG: 81 TABLET, CHEWABLE ORAL at 08:36

## 2024-04-07 RX ADMIN — Medication: at 17:03

## 2024-04-07 RX ADMIN — ENOXAPARIN SODIUM 40 MG: 100 INJECTION SUBCUTANEOUS at 08:38

## 2024-04-07 RX ADMIN — CARVEDILOL 25 MG: 12.5 TABLET, FILM COATED ORAL at 08:36

## 2024-04-07 RX ADMIN — ISOSORBIDE MONONITRATE 60 MG: 30 TABLET, EXTENDED RELEASE ORAL at 08:36

## 2024-04-07 RX ADMIN — INSULIN LISPRO 1 UNITS: 100 INJECTION, SOLUTION INTRAVENOUS; SUBCUTANEOUS at 16:57

## 2024-04-07 RX ADMIN — INSULIN LISPRO 1 UNITS: 100 INJECTION, SOLUTION INTRAVENOUS; SUBCUTANEOUS at 11:50

## 2024-04-07 RX ADMIN — BUMETANIDE 2 MG: 1 TABLET ORAL at 08:37

## 2024-04-07 RX ADMIN — MUPIROCIN: 20 OINTMENT TOPICAL at 20:09

## 2024-04-07 RX ADMIN — Medication 400 MG: at 08:37

## 2024-04-07 RX ADMIN — MUPIROCIN: 20 OINTMENT TOPICAL at 08:39

## 2024-04-07 RX ADMIN — SODIUM CHLORIDE, PRESERVATIVE FREE 10 ML: 5 INJECTION INTRAVENOUS at 20:12

## 2024-04-07 RX ADMIN — Medication: at 20:09

## 2024-04-07 RX ADMIN — Medication: at 14:01

## 2024-04-07 ASSESSMENT — PAIN DESCRIPTION - LOCATION
LOCATION: COCCYX
LOCATION: ANKLE;HEAD
LOCATION: ANKLE;HEAD
LOCATION: LEG

## 2024-04-07 ASSESSMENT — PAIN DESCRIPTION - PAIN TYPE
TYPE: ACUTE PAIN

## 2024-04-07 ASSESSMENT — PAIN DESCRIPTION - FREQUENCY
FREQUENCY: INTERMITTENT

## 2024-04-07 ASSESSMENT — PAIN DESCRIPTION - ORIENTATION
ORIENTATION: LEFT
ORIENTATION: MID
ORIENTATION: LEFT
ORIENTATION: LEFT

## 2024-04-07 ASSESSMENT — PAIN DESCRIPTION - DESCRIPTORS
DESCRIPTORS: BURNING
DESCRIPTORS: ACHING

## 2024-04-07 ASSESSMENT — PAIN DESCRIPTION - ONSET
ONSET: SUDDEN

## 2024-04-07 ASSESSMENT — PAIN SCALES - GENERAL
PAINLEVEL_OUTOF10: 4
PAINLEVEL_OUTOF10: 6
PAINLEVEL_OUTOF10: 2
PAINLEVEL_OUTOF10: 3

## 2024-04-07 NOTE — PROGRESS NOTES
End of Shift Note    Bedside shift change report given to Mayur ALARCON (oncoming nurse) by Francine Cantu RN (offgoing nurse).  Report included the following information SBAR, Kardex, Intake/Output, MAR, and Recent Results    Shift worked:  day     Shift summary and any significant changes:     VS stable, pt up with at least 2 assist gait belt and walker to chair/bsc, prn tramadol given for complaints of pain, voiding clear yellow urine via purewicc, large brown soft and loose bm today     Concerns for physician to address:       Zone phone for oncoming shift:   [phone]       Activity:     Number times ambulated in hallways past shift: 0  Number of times OOB to chair past shift: 0    Cardiac:   Cardiac Monitoring: No           Access:  Current line(s): PIV     Genitourinary:   Urinary status: voiding    Respiratory:      Chronic home O2 use?: YES  Incentive spirometer at bedside: YES       GI:     Current diet:  ADULT DIET; Regular  ADULT ORAL NUTRITION SUPPLEMENT; Breakfast, Lunch, Dinner, AM Snack, PM Snack, HS Snack; Standard High Calorie/High Protein Oral Supplement  Passing flatus: YES  Tolerating current diet: YES       Pain Management:   Patient states pain is manageable on current regimen: YES    Skin:     Interventions: float heels, increase time out of bed, PT/OT consult, limit briefs, and internal/external urinary devices    Patient Safety:  Fall Score:    Interventions: bed/chair alarm, assistive device (walker, cane. etc), gripper socks, pt to call before getting OOB, stay with me (per policy), and gait belt       Length of Stay:  Expected LOS: 5  Actual LOS: 4      Francine Cantu, RN

## 2024-04-07 NOTE — PLAN OF CARE
Problem: Skin/Tissue Integrity  Goal: Absence of new skin breakdown  Description: 1.  Monitor for areas of redness and/or skin breakdown  2.  Assess vascular access sites hourly  3.  Every 4-6 hours minimum:  Change oxygen saturation probe site  4.  Every 4-6 hours:  If on nasal continuous positive airway pressure, respiratory therapy assess nares and determine need for appliance change or resting period.  4/6/2024 2241 by Mayur Hurley RN  Outcome: Progressing  4/6/2024 1038 by Francine Cantu RN  Outcome: Progressing     Problem: Safety - Adult  Goal: Free from fall injury  4/6/2024 2241 by Mayur Hurley RN  Outcome: Progressing  4/6/2024 1038 by Francine Cantu RN  Outcome: Progressing     Problem: ABCDS Injury Assessment  Goal: Absence of physical injury  4/6/2024 2241 by Mayur Hurley RN  Outcome: Progressing  4/6/2024 1038 by Francine Cantu RN  Outcome: Progressing     Problem: Chronic Conditions and Co-morbidities  Goal: Patient's chronic conditions and co-morbidity symptoms are monitored and maintained or improved  4/6/2024 2241 by Mayur Hurley RN  Outcome: Progressing  4/6/2024 1038 by Francine Cantu RN  Outcome: Progressing     Problem: Pain  Goal: Verbalizes/displays adequate comfort level or baseline comfort level  4/6/2024 2241 by Mayur Hurley RN  Outcome: Progressing  4/6/2024 1038 by Francine aCntu RN  Outcome: Progressing     Problem: Respiratory - Adult  Goal: Achieves optimal ventilation and oxygenation  4/6/2024 2241 by Mayur Hurley RN  Outcome: Progressing  4/6/2024 1038 by Francine Cantu RN  Outcome: Progressing

## 2024-04-07 NOTE — PROGRESS NOTES
Hospitalist Progress Note    NAME:   Marilee Oakes   : 1950   MRN: [id number]     Date/Time: 2024 9:37 AM  Patient PCP: Monalisa Thorpe LPN    Estimated discharge date:   Barriers: Final blood cultures and antibiotic recommendation.      Assessment / Plan:  MRSA bacteremia  Recent left ankle ORIF  Fever  Elevated lactic acid  MRSA bacteremia on  as well    Recently Patient developed fever while at rehab and blood cultures were ordered which grew MRSA and was sent to [facility]  LAC - 2.33>0.50  MRSA screen +   Patient underwent a left ankle open reduction into fixation on 3/22 for left ankle fracture. Xray left ankle -  Three views of the left ankle demonstrate interval plate and screw fixation of the fibular fracture with syndesmotic screw. 2 screw fixate the medial malleolar fracture. Fibular fracture demonstrates one half shafts width posterior displacement of the distal fracture fragment. There is less than one half shafts width lateral displacement of the medial malleolar fracture. Bones  are osteopenic. Degenerative changes in the midfoot.  In the emergency department, patient was noted to have small amount of pus coming from left ankle incision according to ER PA which was sent for cultures- LIGHT MRSA   Ortho consulted - advised for NWB , and dressing changes , incisional vac as per wound care .   Ultrasound Doppler of left leg- negative for clots   Infectious disease consulted-  Continue vancomycin per pharmacy, Continue contact precaution.   Patient waiting echo.  : Echo order placed.  Patient reports that her left ankle does not hurt that much.  I spoke with orthopedic PA Rbobin Vee and he told me that patient may need to go to the OR for possible washout/debridement of the left ankle tomorrow.     CKD stage III  Baseline creatinine is around 1.2-1.4.   Currently creatinine is 1.30  Check BMP in a.m.  Renally dose medications   Avoid nephrotoxic medications      Chronic  (36.5 °C) Oral 88 16 98 %   04/06/24 1057 -- -- -- -- 16 --         Intake/Output Summary (Last 24 hours) at 4/7/2024 0937  Last data filed at 4/6/2024 1830  Gross per 24 hour   Intake --   Output 1600 ml   Net -1600 ml        I had a face to face encounter and independently examined this patient on 4/7/2024, as outlined below:  PHYSICAL EXAM:  General: Alert, obese, cooperative  Extremities: Left ankle bandaged  EENT:  EOMI. Anicteric sclerae.  Resp:  2 L of oxygen, diminished breath sounds  CV:  Regular  rhythm,  No edema  GI:  Soft, Non distended, Non tender.  +Bowel sounds  Neurologic:  Alert and oriented X 3, normal speech,   Psych:   Good insight. Not anxious nor agitated  Skin:  No rashes.  No jaundice    Reviewed most current lab test results and cultures  YES  Reviewed most current radiology test results   YES  Review and summation of old records today    NO  Reviewed patient's current orders and MAR    YES  PMH/SH reviewed - no change compared to H&P    Procedures: see electronic medical records for all procedures/Xrays and details which were not copied into this note but were reviewed prior to creation of Plan.      LABS:  I reviewed today's most current labs and imaging studies.  Pertinent labs include:  Recent Labs     04/05/24 0417 04/06/24  0553 04/07/24  0517   WBC 6.1 6.5 5.7   HGB 8.5* 8.2* 8.3*   HCT 30.9* 29.6* 29.9*    451* 442*     Recent Labs     04/05/24 0417 04/06/24  0553 04/07/24  0517    140 142   K 3.7 3.6 3.7    105 106   CO2 32 34* 35*   GLUCOSE 128* 143* 140*   BUN 22* 26* 27*   CREATININE 1.33* 1.30* 1.26*   CALCIUM 9.0 9.0 9.0       Signed: Christa Ordonez MD

## 2024-04-07 NOTE — PLAN OF CARE
Problem: Skin/Tissue Integrity  Goal: Absence of new skin breakdown  Description: 1.  Monitor for areas of redness and/or skin breakdown  2.  Assess vascular access sites hourly  3.  Every 4-6 hours minimum:  Change oxygen saturation probe site  4.  Every 4-6 hours:  If on nasal continuous positive airway pressure, respiratory therapy assess nares and determine need for appliance change or resting period.  4/7/2024 0757 by Francine Cantu RN  Outcome: Progressing  4/6/2024 2241 by Mayur Hurley RN  Outcome: Progressing     Problem: Safety - Adult  Goal: Free from fall injury  4/7/2024 0757 by Francine Cantu RN  Outcome: Progressing  4/6/2024 2241 by Mayur Hurley RN  Outcome: Progressing     Problem: ABCDS Injury Assessment  Goal: Absence of physical injury  4/7/2024 0757 by Francine Cantu RN  Outcome: Progressing  4/6/2024 2241 by Mayur Hurley RN  Outcome: Progressing     Problem: Chronic Conditions and Co-morbidities  Goal: Patient's chronic conditions and co-morbidity symptoms are monitored and maintained or improved  4/7/2024 0757 by Francine Cantu RN  Outcome: Progressing  4/6/2024 2241 by Mayur Hurley RN  Outcome: Progressing     Problem: Pain  Goal: Verbalizes/displays adequate comfort level or baseline comfort level  4/7/2024 0757 by Francine Cantu RN  Outcome: Progressing  4/6/2024 2241 by Mayur Hurley RN  Outcome: Progressing     Problem: Respiratory - Adult  Goal: Achieves optimal ventilation and oxygenation  4/7/2024 0757 by Francine Cantu RN  Outcome: Progressing  4/6/2024 2241 by Mayur Hurley RN  Outcome: Progressing

## 2024-04-08 LAB
ANION GAP SERPL CALC-SCNC: 0 MMOL/L (ref 5–15)
BASOPHILS # BLD: 0 K/UL (ref 0–0.1)
BASOPHILS NFR BLD: 0 % (ref 0–1)
BUN SERPL-MCNC: 27 MG/DL (ref 6–20)
BUN/CREAT SERPL: 20 (ref 12–20)
CALCIUM SERPL-MCNC: 9.4 MG/DL (ref 8.5–10.1)
CHLORIDE SERPL-SCNC: 103 MMOL/L (ref 97–108)
CO2 SERPL-SCNC: 36 MMOL/L (ref 21–32)
CREAT SERPL-MCNC: 1.38 MG/DL (ref 0.55–1.02)
DIFFERENTIAL METHOD BLD: ABNORMAL
EOSINOPHIL # BLD: 0.4 K/UL (ref 0–0.4)
EOSINOPHIL NFR BLD: 6 % (ref 0–7)
ERYTHROCYTE [DISTWIDTH] IN BLOOD BY AUTOMATED COUNT: 16.5 % (ref 11.5–14.5)
GLUCOSE BLD STRIP.AUTO-MCNC: 135 MG/DL (ref 65–117)
GLUCOSE BLD STRIP.AUTO-MCNC: 171 MG/DL (ref 65–117)
GLUCOSE BLD STRIP.AUTO-MCNC: 309 MG/DL (ref 65–117)
GLUCOSE BLD STRIP.AUTO-MCNC: 311 MG/DL (ref 65–117)
GLUCOSE BLD STRIP.AUTO-MCNC: 316 MG/DL (ref 65–117)
GLUCOSE SERPL-MCNC: 206 MG/DL (ref 65–100)
HCT VFR BLD AUTO: 32.4 % (ref 35–47)
HGB BLD-MCNC: 9 G/DL (ref 11.5–16)
IMM GRANULOCYTES # BLD AUTO: 0.1 K/UL (ref 0–0.04)
IMM GRANULOCYTES NFR BLD AUTO: 1 % (ref 0–0.5)
LYMPHOCYTES # BLD: 0.8 K/UL (ref 0.8–3.5)
LYMPHOCYTES NFR BLD: 11 % (ref 12–49)
MAGNESIUM SERPL-MCNC: 1.8 MG/DL (ref 1.6–2.4)
MCH RBC QN AUTO: 24.2 PG (ref 26–34)
MCHC RBC AUTO-ENTMCNC: 27.8 G/DL (ref 30–36.5)
MCV RBC AUTO: 87.1 FL (ref 80–99)
MONOCYTES # BLD: 0.4 K/UL (ref 0–1)
MONOCYTES NFR BLD: 6 % (ref 5–13)
NEUTS SEG # BLD: 5.4 K/UL (ref 1.8–8)
NEUTS SEG NFR BLD: 76 % (ref 32–75)
NRBC # BLD: 0 K/UL (ref 0–0.01)
NRBC BLD-RTO: 0 PER 100 WBC
PHOSPHATE SERPL-MCNC: 3.4 MG/DL (ref 2.6–4.7)
PLATELET # BLD AUTO: 461 K/UL (ref 150–400)
PMV BLD AUTO: 10.4 FL (ref 8.9–12.9)
POTASSIUM SERPL-SCNC: 3.6 MMOL/L (ref 3.5–5.1)
RBC # BLD AUTO: 3.72 M/UL (ref 3.8–5.2)
RBC MORPH BLD: ABNORMAL
SERVICE CMNT-IMP: ABNORMAL
SODIUM SERPL-SCNC: 139 MMOL/L (ref 136–145)
WBC # BLD AUTO: 7.1 K/UL (ref 3.6–11)

## 2024-04-08 PROCEDURE — 94760 N-INVAS EAR/PLS OXIMETRY 1: CPT

## 2024-04-08 PROCEDURE — 2700000000 HC OXYGEN THERAPY PER DAY

## 2024-04-08 PROCEDURE — 80048 BASIC METABOLIC PNL TOTAL CA: CPT

## 2024-04-08 PROCEDURE — APPNB15 APP NON BILLABLE TIME 0-15 MINS: Performed by: ORTHOPAEDIC SURGERY

## 2024-04-08 PROCEDURE — 82962 GLUCOSE BLOOD TEST: CPT

## 2024-04-08 PROCEDURE — 83735 ASSAY OF MAGNESIUM: CPT

## 2024-04-08 PROCEDURE — 97530 THERAPEUTIC ACTIVITIES: CPT

## 2024-04-08 PROCEDURE — 1100000003 HC PRIVATE W/ TELEMETRY

## 2024-04-08 PROCEDURE — 97535 SELF CARE MNGMENT TRAINING: CPT

## 2024-04-08 PROCEDURE — 6360000002 HC RX W HCPCS: Performed by: STUDENT IN AN ORGANIZED HEALTH CARE EDUCATION/TRAINING PROGRAM

## 2024-04-08 PROCEDURE — 94640 AIRWAY INHALATION TREATMENT: CPT

## 2024-04-08 PROCEDURE — 2580000003 HC RX 258: Performed by: INTERNAL MEDICINE

## 2024-04-08 PROCEDURE — 36415 COLL VENOUS BLD VENIPUNCTURE: CPT

## 2024-04-08 PROCEDURE — 85025 COMPLETE CBC W/AUTO DIFF WBC: CPT

## 2024-04-08 PROCEDURE — 99233 SBSQ HOSP IP/OBS HIGH 50: CPT | Performed by: INTERNAL MEDICINE

## 2024-04-08 PROCEDURE — 94761 N-INVAS EAR/PLS OXIMETRY MLT: CPT

## 2024-04-08 PROCEDURE — 6360000002 HC RX W HCPCS: Performed by: INTERNAL MEDICINE

## 2024-04-08 PROCEDURE — 6370000000 HC RX 637 (ALT 250 FOR IP): Performed by: INTERNAL MEDICINE

## 2024-04-08 PROCEDURE — 84100 ASSAY OF PHOSPHORUS: CPT

## 2024-04-08 RX ORDER — ENOXAPARIN SODIUM 100 MG/ML
30 INJECTION SUBCUTANEOUS 2 TIMES DAILY
Status: DISCONTINUED | OUTPATIENT
Start: 2024-04-08 | End: 2024-04-12 | Stop reason: HOSPADM

## 2024-04-08 RX ADMIN — ASPIRIN 81 MG: 81 TABLET, CHEWABLE ORAL at 09:48

## 2024-04-08 RX ADMIN — CLONAZEPAM 0.5 MG: 0.5 TABLET ORAL at 21:23

## 2024-04-08 RX ADMIN — MUPIROCIN: 20 OINTMENT TOPICAL at 09:50

## 2024-04-08 RX ADMIN — MUPIROCIN: 20 OINTMENT TOPICAL at 21:40

## 2024-04-08 RX ADMIN — BUMETANIDE 2 MG: 1 TABLET ORAL at 09:48

## 2024-04-08 RX ADMIN — ARFORMOTEROL TARTRATE: 15 SOLUTION RESPIRATORY (INHALATION) at 09:36

## 2024-04-08 RX ADMIN — CARVEDILOL 25 MG: 12.5 TABLET, FILM COATED ORAL at 16:59

## 2024-04-08 RX ADMIN — Medication: at 21:40

## 2024-04-08 RX ADMIN — CARVEDILOL 25 MG: 12.5 TABLET, FILM COATED ORAL at 09:48

## 2024-04-08 RX ADMIN — POTASSIUM CHLORIDE 10 MEQ: 1500 TABLET, EXTENDED RELEASE ORAL at 09:48

## 2024-04-08 RX ADMIN — INSULIN LISPRO 4 UNITS: 100 INJECTION, SOLUTION INTRAVENOUS; SUBCUTANEOUS at 21:23

## 2024-04-08 RX ADMIN — SODIUM CHLORIDE, PRESERVATIVE FREE 10 ML: 5 INJECTION INTRAVENOUS at 09:49

## 2024-04-08 RX ADMIN — Medication 400 MG: at 09:48

## 2024-04-08 RX ADMIN — INSULIN LISPRO 3 UNITS: 100 INJECTION, SOLUTION INTRAVENOUS; SUBCUTANEOUS at 11:49

## 2024-04-08 RX ADMIN — Medication: at 09:50

## 2024-04-08 RX ADMIN — SODIUM CHLORIDE, PRESERVATIVE FREE 10 ML: 5 INJECTION INTRAVENOUS at 21:26

## 2024-04-08 RX ADMIN — Medication: at 13:56

## 2024-04-08 RX ADMIN — ENOXAPARIN SODIUM 40 MG: 100 INJECTION SUBCUTANEOUS at 09:49

## 2024-04-08 RX ADMIN — ISOSORBIDE MONONITRATE 60 MG: 30 TABLET, EXTENDED RELEASE ORAL at 09:48

## 2024-04-08 RX ADMIN — Medication 400 MG: at 21:23

## 2024-04-08 RX ADMIN — Medication: at 17:00

## 2024-04-08 RX ADMIN — PREGABALIN 300 MG: 150 CAPSULE ORAL at 09:48

## 2024-04-08 RX ADMIN — PANTOPRAZOLE SODIUM 40 MG: 40 TABLET, DELAYED RELEASE ORAL at 05:03

## 2024-04-08 RX ADMIN — DAPTOMYCIN 650 MG: 500 INJECTION, POWDER, LYOPHILIZED, FOR SOLUTION INTRAVENOUS at 13:37

## 2024-04-08 ASSESSMENT — PAIN SCALES - GENERAL
PAINLEVEL_OUTOF10: 0
PAINLEVEL_OUTOF10: 0

## 2024-04-08 NOTE — PROGRESS NOTES
End of Shift Note    Bedside shift change report given to Yared ALARCON (oncoming nurse) by Kimmie Saha RN (offgoing nurse).  Report included the following information SBAR and Kardex    Shift worked:  days     Shift summary and any significant changes:     Started dabtomycin     Concerns for physician to address:       Zone phone for oncoming shift:   0650       Activity:     Number times ambulated in hallways past shift: 0  Number of times OOB to chair past shift: 1    Cardiac:   Cardiac Monitoring: No           Access:  Current line(s): PIV     Genitourinary:   Urinary status: external catheter    Respiratory:      Chronic home O2 use?: YES  Incentive spirometer at bedside: YES       GI:     Current diet:  ADULT ORAL NUTRITION SUPPLEMENT; Breakfast, Lunch, Dinner; Diabetic Oral Supplement  ADULT DIET; Regular; Safety Tray; Safety Tray (Disposables)  Diet NPO  Passing flatus: YES  Tolerating current diet: YES       Pain Management:   Patient states pain is manageable on current regimen: YES    Skin:     Interventions: float heels, increase time out of bed, PT/OT consult, limit briefs, internal/external urinary devices, and nutritional support    Patient Safety:  Fall Score:    Interventions: assistive device (walker, cane. etc), gripper socks, and gait belt       Length of Stay:  Expected LOS: 6  Actual LOS: 5      Kimmie Saha RN

## 2024-04-08 NOTE — PLAN OF CARE
Problem: Physical Therapy - Adult  Goal: By Discharge: Performs mobility at highest level of function for planned discharge setting.  See evaluation for individualized goals.  Description: FUNCTIONAL STATUS PRIOR TO ADMISSION: Pt comes from [facility] s/p having fall with L ankle fx ORIF done on 3/23/24; now to ED from rehab with fever, MRSA and now with wound vac L ankle; pt is NWB and was working on scooting, lateral transfers, and standing at rehab to this point    HOME SUPPORT PRIOR TO ADMISSION: was at [facility]    Physical Therapy Goals  Initiated 4/5/2024  1.  Patient will move from supine to sit and sit to supine, scoot up and down, and roll side to side in bed with supervision/set-up within 7 day(s).    2.  Patient will perform sit to stand with moderate assistance within 7 day(s).  3.  Patient will transfer from bed to chair and chair to bed with contact guard assist using the least restrictive device/lateral scoot within 7 day(s).      Outcome: Progressing     PHYSICAL THERAPY TREATMENT    Patient: Marilee Oakes (73 y.o. female)  Date: 4/8/2024  Diagnosis: Acute systolic heart failure (HCC) [I50.21]  Surgical wound infection [T81.49XA]  MRSA bacteremia [R78.81, B95.62]  Complication of procedure, initial encounter [T81.9XXA] Bacteremia due to methicillin resistant Staphylococcus aureus      Precautions: Contact Precautions, Other (comment), Weight Bearing, Fall Risk, Bed Alarm (MRSA, now with L ankle wound vac)   Left Lower Extremity Weight Bearing: Non Weight Bearing                  ASSESSMENT:  Patient continues to benefit from skilled PT services and is progressing towards goals. Patient continues to present with impaired strength, ROM, activity tolerance, balance, and functional transfers. Patient participated in bilateral rolling with Min A and was dependent for posterior hygiene due to bowel incontinence. Obtained drop-arm recliner to facilitate lateral scooting transfer. Patient required Max  cues;Safety awareness training;Demonstration;Weight shifting training/pressure relief  Bed to Chair: Maximum assistance;Assist X2 (Lateral transfer to drop-arm recliner)  Balance:  Balance  Sitting: Intact             Pain Ratin/10   Pain Intervention(s):       Activity Tolerance:   Fair  and requires rest breaks    After treatment:   Patient left in no apparent distress in bed, Call bell within reach, Bed/ chair alarm activated, Caregiver / family present, Side rails x3, and Patient offloaded in partial L side lying for pressure relief      COMMUNICATION/EDUCATION:   The patient's plan of care was discussed with: occupational therapist and registered nurse    Patient Education  Education Given To: Patient  Education Provided: Role of Therapy;Plan of Care;Precautions;Transfer Training  Education Method: Verbal;Teach Back  Barriers to Learning: Cognition  Education Outcome: Continued education needed;Verbalized understanding      Arnel Obrien, PT  Minutes: 35

## 2024-04-08 NOTE — PROGRESS NOTES
Infectious Disease progress          Impression    MRSA bacteremia  Blood cultures + for MRSA at   Details unavailable at this time  Blood cultures 4/3-no growth.    Bimalleolar left ankle fracture  S/p ORIF L/ankle fracture 3/23  Swelling + & serosanguinous drainage at surgical site  S/p incisional wound VAC placement 4/4.  Wound culture 4/3+ for MRSA  Culture reported from drainage at surgical site.    CAD  Acute on chronic systolic CHF  A.fib  Watchmen present.  Retained RV pacing lead+.      HILARY on CKD 3  Worsening Cr 1.38      Diabetes Mellitus 2   On SSI   A1c 5.8      COPD  On 2 L  LEELA on CPAP    Plan  Change to Daptomycin 8mg/ kg IV  No statin on Daptomycin please  Adequate fluids, daily probiotic  ECHO, pt will need JUAN MANUEL if pacer lead+  Elevate LLE  Ortho for next steps  ID service to follow.            Extensive review of chart notes, labs, imaging, cultures done  Additionally review of done: Recent reports-Labs, cultures, imaging  D/w -hospitalist, RN  Patient seen today.  Laying in bed.  Denies complaints.Wound dresssing +    Past Medical History:   Diagnosis Date    Age-related osteoporosis without current pathological fracture     Anxiety and depression 01/22/2018    Arthritis     OA    Asthma     CAD (coronary artery disease)     Chronic systolic heart failure (HCC)     CKD stage G3b/A1, GFR 30-44 and albumin creatinine ratio <30 mg/g (HCC)     Essential hypertension     GERD (gastroesophageal reflux disease)     History of diverticulitis     diverticulitis    History of echocardiogram 11/2021    LV: Estimated LVEF is 40 - 45%. Visually measured ejection fraction. Normal cavity size and wall thickness. Globally reduced systolic function.  LA: Moderately dilated left atrium. Left atrial appendage is completely occluded. A Watchman left atrial appendage occluder is present and completely occludes the appendage.    History of migraine     Hypercholesterolemia 01/22/2018    Irritable bowel syndrome      reduction of bimalleolar acute displaced fracture, with persistent lateral subluxation at the tibiotalar joint.    XR CHEST PORTABLE    Result Date: 3/22/2024  PORTABLE CHEST RADIOGRAPH/S: 3/22/2024 9:32 AM INDICATION: Preop. COMPARISON: 1/22/2024. TECHNIQUE: Portable frontal semiupright radiograph/s of the chest. FINDINGS: The lungs are slightly hypoinflated, but clear. The central airways are patent. No pneumothorax or pleural effusion. The heart is enlarged, even given portable technique, and the patient is post pacemaker/AICD placement.     Clear lungs. Cardiomegaly.    XR ANKLE LEFT (MIN 3 VIEWS)    Result Date: 3/22/2024  EXAM: XR ANKLE LEFT (MIN 3 VIEWS) INDICATION: trauma. COMPARISON: None. FINDINGS: Three views of the left ankle demonstrate a mildly displaced bimalleolar ankle fracture with overlying soft tissue swelling. The ankle mortise appears grossly intact..      Mildly displaced bimalleolar ankle fracture with overlying soft tissue swelling.      Mariela Marino MD FACP

## 2024-04-08 NOTE — PROGRESS NOTES
n.p.o. after midnight for the same.  The orthopedics is recommending continuing IV antibiotics for now.     CKD stage III  Baseline creatinine is around 1.2-1.4.   Currently creatinine is 1.30  Check BMP in a.m.  Renally dose medications   Avoid nephrotoxic medications      Chronic respiratory failure due to COPD on 2 L at all times  Obstructive sleep apnea on CPAP  Hypertension  Coronary artery disease  Chronic systolic congestive heart failure s/p AICD  Atrial fibrillation status post watchman's device and aspirin  Dyslipidemia  Diabetes with neuropathy  Mild chronic anemia   Continue on home O2.  Continue nocturnal CPAP.  Continue PTA Coreg and Imdur.  Norvasc was held during last admission due to borderline low blood pressure  Continue Bumex with potassium supplementation  Continue PTA aspirin  It appears that she is not on any medications for diabetes at home.  Last A1c back in 2027 was 5.2.  HB not requiring transfusion at this moment - can monitor           Medical Decision Making:   I personally reviewed labs: CBC, BMP, blood cultures, wound culture  I personally reviewed imaging:  I personally reviewed EKG:  Toxic drug monitoring: Kidney function while patient is on IV vancomycin  Discussed case with: Patient, RN, ID,         Code Status: Full code  DVT Prophylaxis: lovenox   Baseline: From rehab    Subjective:     Chief Complaint / Reason for Physician Visit  \" Follow-up for MRSA bacteremia, recent left knee ORIF, elevated lactic acid, CKD 3, chronic respiratory failure on 2 L of oxygen, COPD, LEELA on CPAP, HTN, CAD, CHF, status post AICD, A-fib with Watchman device, HLD, diabetes mellitus, neuropathy, chronic anemia.\".  Discussed with RN events overnight.       Objective:     VITALS:   Last 24hrs VS reviewed since prior progress note. Most recent are:  Patient Vitals for the past 24 hrs:   BP Temp Temp src Pulse Resp SpO2   04/08/24 0308 (!) 148/80 99 °F (37.2 °C) -- 81 18 92 %   04/07/24 2230  136/73 98.6 °F (37 °C) -- 80 16 99 %   04/07/24 2000 129/68 97.9 °F (36.6 °C) Oral 82 17 98 %   04/07/24 1520 (!) 145/82 -- -- 83 -- 97 %   04/07/24 1401 -- -- -- -- 16 --   04/07/24 0723 115/81 98.4 °F (36.9 °C) -- 79 16 99 %         Intake/Output Summary (Last 24 hours) at 4/8/2024 0709  Last data filed at 4/7/2024 1700  Gross per 24 hour   Intake --   Output 1100 ml   Net -1100 ml        I had a face to face encounter and independently examined this patient on 4/8/2024, as outlined below:  PHYSICAL EXAM:  General: Alert, obese, cooperative  Extremities: Left ankle bandaged with wound VAC in place draining serosanguineous fluid  EENT:  EOMI. Anicteric sclerae.  Resp:  2 L of oxygen, diminished breath sounds  CV:  Regular  rhythm,  No edema  GI:  Soft, Non distended, Non tender.  +Bowel sounds  Neurologic:  Alert and oriented X 3, normal speech,   Psych:   Good insight. Not anxious nor agitated  Skin:  No rashes.  No jaundice    Reviewed most current lab test results and cultures  YES  Reviewed most current radiology test results   YES  Review and summation of old records today    NO  Reviewed patient's current orders and MAR    YES  PMH/SH reviewed - no change compared to H&P    Procedures: see electronic medical records for all procedures/Xrays and details which were not copied into this note but were reviewed prior to creation of Plan.      LABS:  I reviewed today's most current labs and imaging studies.  Pertinent labs include:  Recent Labs     04/06/24  0553 04/07/24  0517   WBC 6.5 5.7   HGB 8.2* 8.3*   HCT 29.6* 29.9*   * 442*     Recent Labs     04/06/24  0553 04/07/24  0517    142   K 3.6 3.7    106   CO2 34* 35*   GLUCOSE 143* 140*   BUN 26* 27*   CREATININE 1.30* 1.26*   CALCIUM 9.0 9.0       Signed: Christa Ordonez MD

## 2024-04-08 NOTE — PLAN OF CARE
Problem: Occupational Therapy - Adult  Goal: By Discharge: Performs self-care activities at highest level of function for planned discharge setting.  See evaluation for individualized goals.  Description: FUNCTIONAL STATUS PRIOR TO ADMISSION:  At true baseline, patient generally modified independent with ADLs and functional mobility using rollator. She does benefit from supervision for shower transfers. Patient participates in light home management ADLs. Note recent admission and L ankle ORIF on 3/22 following fracture; patient discharged to [facility].     Receives Help From: Family ( assists with IADLs when needed), ADL Assistance: Independent, Ambulation Assistance: Needs assistance,  , Active : No (active  at baseline, not recently)     HOME SUPPORT: Patient lived with supportive spouse.    Occupational Therapy Goals:  Initiated 4/5/2024  1.  Patient will perform adhere to NWB LLE throughout functional activity/mobility with minimal verbal cues within 7 day(s).  2.  Patient will perform upper body dressing and bathing with Supervision within 7 day(s).  3.  Patient will perform seated lower body bathing using lateral weight-shifting technique with Minimal Assist within 7 day(s).  4.  Patient will perform toilet transfers to bariatric drop-arm [facility] with Supervision / Set-up within 7 day(s).  5.  Patient will perform all aspects of seated toileting with Minimal Assist within 7 day(s).  6.  Patient will participate in upper extremity therapeutic exercise/activities with Supervision for 10 minutes within 7 day(s).    7.  Patient will utilize energy conservation techniques during functional activities with verbal cues within 7 day(s).  Outcome: Progressing   OCCUPATIONAL THERAPY TREATMENT  Patient: Marilee Oakes (73 y.o. female)  Date: 4/8/2024  Primary Diagnosis: Acute systolic heart failure (HCC) [I50.21]  Surgical wound infection [T81.49XA]  MRSA bacteremia [R78.81, B95.62]  Complication of

## 2024-04-08 NOTE — PROGRESS NOTES
Brief orthopedic note:    Plan:  -No current drainage other than minimal bloody discharge noted in wound VAC canister.  -Recommend continuing IV antibiotics for now.  -Will continue to monitor for output of the patient's wound VAC.  No plans for current surgical interventions.  Will allow time for fracture to heal after ORIF prior to consideration of further surgeries for the patient's wound unless symptoms are significantly worsening.  -Continue strict nonweightbearing left lower extremity    Plan per Dr Farnsworth.   NIKKI Stallings

## 2024-04-08 NOTE — PROGRESS NOTES
Cardiology: MRSA on outside blood Cx per report;   Neg MRSA bacteremia here; +MRSA nares/wound.    JUAN MANUEL requested (negative 12/2023): NPO p MN. All risks d/w pt.

## 2024-04-08 NOTE — PROGRESS NOTES
TRANSFER - IN REPORT:    Verbal report received from Tanya ALARCON on Marilee Oakes  being received from PACU for routine post-op      Report consisted of patient's Situation, Background, Assessment and   Recommendations(SBAR).     Information from the following report(s) Nurse Handoff Report and Index was reviewed with the receiving nurse.    Opportunity for questions and clarification was provided.      Assessment completed upon patient's arrival to unit and care assumed.

## 2024-04-08 NOTE — PROGRESS NOTES
Pharmacy Note - Dose adjustment for daptomycin per P&T approved protocol.    Dosing Weight Used: AdjBW: [(102.7 - 66.2) * 0.4] + 66.2 = 80.8    BMI:   BMI Readings from Last 1 Encounters:   04/03/24 33.44 kg/m²     Height:   Ht Readings from Last 1 Encounters:   04/03/24 1.753 m (5' 9\")     ABW:  Wt Readings from Last 1 Encounters:   04/03/24 102.7 kg (226 lb 6.6 oz)        IBW and AdjBW: Ideal body weight: 66.2 kg (145 lb 15.1 oz)  Adjusted ideal body weight: 80.8 kg (178 lb 2.1 oz)        Dosing:  Estimated Creatinine Clearance: 48 mL/min (A) (based on SCr of 1.38 mg/dL (H)).  Recent Labs     04/06/24  0553 04/07/24  0517 04/08/24  0853   BUN 26* 27* 27*   CREATININE 1.30* 1.26* 1.38*           Max recommended dose is 1500mg daily. If dose is exceeded confirm dosing with infectious disease physician or fellow pharmacist. Increased incidence of side effects although benefit may outweigh risks in some cases. See dosing protocol for more information.    Statin Therapy    No statin therapy currently ordered    CK Monitoring    Due to obesity, renal impairment or drug interactions CK will be ordered more frequently then once weekly.    Ordered dose:    Daptomycin 800 mg IV every 24 hours has been ordered but will be adjusted to 650 mg IV q24h, (8mg/kg based on adjusted body weight) due to BMI > 30.    Thank you,  ANA LILIA SANTACRUZ, [facility]  4/8/2024, 11:57 AM

## 2024-04-08 NOTE — PLAN OF CARE
Problem: Skin/Tissue Integrity  Goal: Absence of new skin breakdown  Description: 1.  Monitor for areas of redness and/or skin breakdown  2.  Assess vascular access sites hourly  3.  Every 4-6 hours minimum:  Change oxygen saturation probe site  4.  Every 4-6 hours:  If on nasal continuous positive airway pressure, respiratory therapy assess nares and determine need for appliance change or resting period.  Outcome: Progressing     Problem: Safety - Adult  Goal: Free from fall injury  Outcome: Progressing     Problem: ABCDS Injury Assessment  Goal: Absence of physical injury  Outcome: Progressing     Problem: Chronic Conditions and Co-morbidities  Goal: Patient's chronic conditions and co-morbidity symptoms are monitored and maintained or improved  Outcome: Progressing     Problem: Pain  Goal: Verbalizes/displays adequate comfort level or baseline comfort level  Outcome: Progressing     Problem: Respiratory - Adult  Goal: Achieves optimal ventilation and oxygenation  Outcome: Progressing

## 2024-04-09 ENCOUNTER — APPOINTMENT (OUTPATIENT)
Facility: HOSPITAL | Age: 74
End: 2024-04-09
Attending: INTERNAL MEDICINE
Payer: MEDICARE

## 2024-04-09 LAB
ANION GAP SERPL CALC-SCNC: 4 MMOL/L (ref 5–15)
BACTERIA SPEC CULT: NORMAL
BACTERIA SPEC CULT: NORMAL
BUN SERPL-MCNC: 31 MG/DL (ref 6–20)
BUN/CREAT SERPL: 19 (ref 12–20)
CALCIUM SERPL-MCNC: 9.4 MG/DL (ref 8.5–10.1)
CHLORIDE SERPL-SCNC: 103 MMOL/L (ref 97–108)
CO2 SERPL-SCNC: 34 MMOL/L (ref 21–32)
CREAT SERPL-MCNC: 1.6 MG/DL (ref 0.55–1.02)
ECHO BSA: 2.24 M2
ERYTHROCYTE [DISTWIDTH] IN BLOOD BY AUTOMATED COUNT: 16.7 % (ref 11.5–14.5)
GLUCOSE BLD STRIP.AUTO-MCNC: 131 MG/DL (ref 65–117)
GLUCOSE BLD STRIP.AUTO-MCNC: 165 MG/DL (ref 65–117)
GLUCOSE BLD STRIP.AUTO-MCNC: 177 MG/DL (ref 65–117)
GLUCOSE BLD STRIP.AUTO-MCNC: 193 MG/DL (ref 65–117)
GLUCOSE SERPL-MCNC: 141 MG/DL (ref 65–100)
HCT VFR BLD AUTO: 29.8 % (ref 35–47)
HGB BLD-MCNC: 8.3 G/DL (ref 11.5–16)
LACTATE SERPL-SCNC: 1.8 MMOL/L (ref 0.4–2)
MAGNESIUM SERPL-MCNC: 1.9 MG/DL (ref 1.6–2.4)
MCH RBC QN AUTO: 24.6 PG (ref 26–34)
MCHC RBC AUTO-ENTMCNC: 27.9 G/DL (ref 30–36.5)
MCV RBC AUTO: 88.2 FL (ref 80–99)
NRBC # BLD: 0 K/UL (ref 0–0.01)
NRBC BLD-RTO: 0 PER 100 WBC
PHOSPHATE SERPL-MCNC: 4.2 MG/DL (ref 2.6–4.7)
PLATELET # BLD AUTO: 445 K/UL (ref 150–400)
PMV BLD AUTO: 10.1 FL (ref 8.9–12.9)
POTASSIUM SERPL-SCNC: 4.1 MMOL/L (ref 3.5–5.1)
RBC # BLD AUTO: 3.38 M/UL (ref 3.8–5.2)
SERVICE CMNT-IMP: ABNORMAL
SERVICE CMNT-IMP: NORMAL
SERVICE CMNT-IMP: NORMAL
SODIUM SERPL-SCNC: 141 MMOL/L (ref 136–145)
WBC # BLD AUTO: 6.7 K/UL (ref 3.6–11)

## 2024-04-09 PROCEDURE — 80048 BASIC METABOLIC PNL TOTAL CA: CPT

## 2024-04-09 PROCEDURE — 94640 AIRWAY INHALATION TREATMENT: CPT

## 2024-04-09 PROCEDURE — 99152 MOD SED SAME PHYS/QHP 5/>YRS: CPT

## 2024-04-09 PROCEDURE — 2580000003 HC RX 258: Performed by: INTERNAL MEDICINE

## 2024-04-09 PROCEDURE — 6360000002 HC RX W HCPCS: Performed by: INTERNAL MEDICINE

## 2024-04-09 PROCEDURE — 82962 GLUCOSE BLOOD TEST: CPT

## 2024-04-09 PROCEDURE — 84100 ASSAY OF PHOSPHORUS: CPT

## 2024-04-09 PROCEDURE — 6370000000 HC RX 637 (ALT 250 FOR IP): Performed by: INTERNAL MEDICINE

## 2024-04-09 PROCEDURE — 36415 COLL VENOUS BLD VENIPUNCTURE: CPT

## 2024-04-09 PROCEDURE — 83605 ASSAY OF LACTIC ACID: CPT

## 2024-04-09 PROCEDURE — 6360000002 HC RX W HCPCS: Performed by: STUDENT IN AN ORGANIZED HEALTH CARE EDUCATION/TRAINING PROGRAM

## 2024-04-09 PROCEDURE — 99153 MOD SED SAME PHYS/QHP EA: CPT

## 2024-04-09 PROCEDURE — 83735 ASSAY OF MAGNESIUM: CPT

## 2024-04-09 PROCEDURE — 1100000003 HC PRIVATE W/ TELEMETRY

## 2024-04-09 PROCEDURE — 93312 ECHO TRANSESOPHAGEAL: CPT

## 2024-04-09 PROCEDURE — B24BZZ4 ULTRASONOGRAPHY OF HEART WITH AORTA, TRANSESOPHAGEAL: ICD-10-PCS | Performed by: INTERNAL MEDICINE

## 2024-04-09 PROCEDURE — 94761 N-INVAS EAR/PLS OXIMETRY MLT: CPT

## 2024-04-09 PROCEDURE — 85027 COMPLETE CBC AUTOMATED: CPT

## 2024-04-09 PROCEDURE — 2700000000 HC OXYGEN THERAPY PER DAY

## 2024-04-09 PROCEDURE — 94760 N-INVAS EAR/PLS OXIMETRY 1: CPT

## 2024-04-09 RX ORDER — MIDAZOLAM HYDROCHLORIDE 1 MG/ML
INJECTION INTRAMUSCULAR; INTRAVENOUS PRN
Status: COMPLETED | OUTPATIENT
Start: 2024-04-09 | End: 2024-04-09

## 2024-04-09 RX ORDER — FENTANYL CITRATE 50 UG/ML
INJECTION, SOLUTION INTRAMUSCULAR; INTRAVENOUS PRN
Status: COMPLETED | OUTPATIENT
Start: 2024-04-09 | End: 2024-04-09

## 2024-04-09 RX ORDER — LIDOCAINE HYDROCHLORIDE 20 MG/ML
SOLUTION OROPHARYNGEAL PRN
Status: COMPLETED | OUTPATIENT
Start: 2024-04-09 | End: 2024-04-09

## 2024-04-09 RX ADMIN — CARVEDILOL 25 MG: 12.5 TABLET, FILM COATED ORAL at 09:03

## 2024-04-09 RX ADMIN — CARVEDILOL 25 MG: 12.5 TABLET, FILM COATED ORAL at 17:07

## 2024-04-09 RX ADMIN — MIDAZOLAM HYDROCHLORIDE 2 MG: 1 INJECTION, SOLUTION INTRAMUSCULAR; INTRAVENOUS at 10:46

## 2024-04-09 RX ADMIN — Medication: at 17:07

## 2024-04-09 RX ADMIN — LIDOCAINE HYDROCHLORIDE 15 ML: 20 SOLUTION ORAL at 10:42

## 2024-04-09 RX ADMIN — DAPTOMYCIN 650 MG: 500 INJECTION, POWDER, LYOPHILIZED, FOR SOLUTION INTRAVENOUS at 13:44

## 2024-04-09 RX ADMIN — ARFORMOTEROL TARTRATE: 15 SOLUTION RESPIRATORY (INHALATION) at 08:23

## 2024-04-09 RX ADMIN — Medication: at 09:04

## 2024-04-09 RX ADMIN — Medication: at 15:41

## 2024-04-09 RX ADMIN — ACETAMINOPHEN 650 MG: 325 TABLET ORAL at 15:43

## 2024-04-09 RX ADMIN — FENTANYL CITRATE 25 MCG: 50 INJECTION, SOLUTION INTRAMUSCULAR; INTRAVENOUS at 10:46

## 2024-04-09 RX ADMIN — SODIUM CHLORIDE, PRESERVATIVE FREE 10 ML: 5 INJECTION INTRAVENOUS at 09:05

## 2024-04-09 RX ADMIN — BENZOCAINE, BUTAMBEN, AND TETRACAINE HYDROCHLORIDE 3 SPRAY: .028; .004; .004 AEROSOL, SPRAY TOPICAL at 10:44

## 2024-04-09 ASSESSMENT — PAIN SCALES - GENERAL
PAINLEVEL_OUTOF10: 8
PAINLEVEL_OUTOF10: 0

## 2024-04-09 ASSESSMENT — PAIN DESCRIPTION - LOCATION: LOCATION: BACK

## 2024-04-09 ASSESSMENT — PAIN DESCRIPTION - ORIENTATION: ORIENTATION: MID;POSTERIOR

## 2024-04-09 ASSESSMENT — PAIN - FUNCTIONAL ASSESSMENT: PAIN_FUNCTIONAL_ASSESSMENT: PREVENTS OR INTERFERES SOME ACTIVE ACTIVITIES AND ADLS

## 2024-04-09 ASSESSMENT — PAIN DESCRIPTION - DESCRIPTORS: DESCRIPTORS: ACHING;SORE

## 2024-04-09 ASSESSMENT — PAIN DESCRIPTION - PAIN TYPE: TYPE: CHRONIC PAIN

## 2024-04-09 NOTE — PLAN OF CARE
Problem: Skin/Tissue Integrity  Goal: Absence of new skin breakdown  Description: 1.  Monitor for areas of redness and/or skin breakdown  2.  Assess vascular access sites hourly  3.  Every 4-6 hours minimum:  Change oxygen saturation probe site  4.  Every 4-6 hours:  If on nasal continuous positive airway pressure, respiratory therapy assess nares and determine need for appliance change or resting period.  Outcome: Progressing     Problem: Safety - Adult  Goal: Free from fall injury  Outcome: Progressing  Flowsheets (Taken 4/8/2024 0806 by Kimmie Saha, RN)  Free From Fall Injury:   Instruct family/caregiver on patient safety   Based on caregiver fall risk screen, instruct family/caregiver to ask for assistance with transferring infant if caregiver noted to have fall risk factors     Problem: ABCDS Injury Assessment  Goal: Absence of physical injury  Outcome: Progressing  Flowsheets (Taken 4/5/2024 0027)  Absence of Physical Injury: Implement safety measures based on patient assessment     Problem: Chronic Conditions and Co-morbidities  Goal: Patient's chronic conditions and co-morbidity symptoms are monitored and maintained or improved  Outcome: Progressing  Flowsheets (Taken 4/8/2024 0806 by Kimmie Saha, RN)  Care Plan - Patient's Chronic Conditions and Co-Morbidity Symptoms are Monitored and Maintained or Improved:   Monitor and assess patient's chronic conditions and comorbid symptoms for stability, deterioration, or improvement   Collaborate with multidisciplinary team to address chronic and comorbid conditions and prevent exacerbation or deterioration     Problem: Pain  Goal: Verbalizes/displays adequate comfort level or baseline comfort level  Outcome: Progressing  Flowsheets (Taken 4/5/2024 0027)  Verbalizes/displays adequate comfort level or baseline comfort level:   Encourage patient to monitor pain and request assistance   Assess pain using appropriate pain scale   Administer analgesics based on

## 2024-04-09 NOTE — PLAN OF CARE
Problem: Skin/Tissue Integrity  Goal: Absence of new skin breakdown  Description: 1.  Monitor for areas of redness and/or skin breakdown  2.  Assess vascular access sites hourly  3.  Every 4-6 hours minimum:  Change oxygen saturation probe site  4.  Every 4-6 hours:  If on nasal continuous positive airway pressure, respiratory therapy assess nares and determine need for appliance change or resting period.  4/9/2024 1100 by Kimmie Saha RN  Outcome: Progressing  4/8/2024 2350 by Yared Odom RN  Outcome: Progressing     Problem: Safety - Adult  Goal: Free from fall injury  4/9/2024 1100 by Kimmie Saha RN  Outcome: Progressing  Flowsheets (Taken 4/9/2024 0815)  Free From Fall Injury: Instruct family/caregiver on patient safety  4/8/2024 2350 by Yared Odom RN  Outcome: Progressing  Flowsheets (Taken 4/8/2024 0806 by Kimmie Saha RN)  Free From Fall Injury:   Instruct family/caregiver on patient safety   Based on caregiver fall risk screen, instruct family/caregiver to ask for assistance with transferring infant if caregiver noted to have fall risk factors     Problem: ABCDS Injury Assessment  Goal: Absence of physical injury  4/9/2024 1100 by Kimmie Saha RN  Outcome: Progressing  4/8/2024 2350 by Yared Odom RN  Outcome: Progressing  Flowsheets (Taken 4/5/2024 0027)  Absence of Physical Injury: Implement safety measures based on patient assessment     Problem: Chronic Conditions and Co-morbidities  Goal: Patient's chronic conditions and co-morbidity symptoms are monitored and maintained or improved  4/9/2024 1100 by Kimmie Saha RN  Outcome: Progressing  Flowsheets (Taken 4/9/2024 0815)  Care Plan - Patient's Chronic Conditions and Co-Morbidity Symptoms are Monitored and Maintained or Improved: Monitor and assess patient's chronic conditions and comorbid symptoms for stability, deterioration, or improvement  4/8/2024 2350 by Yared Odom RN  Outcome:  Progressing  Flowsheets (Taken 4/8/2024 0806 by Kimmie Saha RN)  Care Plan - Patient's Chronic Conditions and Co-Morbidity Symptoms are Monitored and Maintained or Improved:   Monitor and assess patient's chronic conditions and comorbid symptoms for stability, deterioration, or improvement   Collaborate with multidisciplinary team to address chronic and comorbid conditions and prevent exacerbation or deterioration     Problem: Pain  Goal: Verbalizes/displays adequate comfort level or baseline comfort level  4/9/2024 1100 by Kimmie Saha RN  Outcome: Progressing  4/8/2024 2350 by Yared Odom, RN  Outcome: Progressing  Flowsheets (Taken 4/5/2024 0027)  Verbalizes/displays adequate comfort level or baseline comfort level:   Encourage patient to monitor pain and request assistance   Assess pain using appropriate pain scale   Administer analgesics based on type and severity of pain and evaluate response   Implement non-pharmacological measures as appropriate and evaluate response   Consider cultural and social influences on pain and pain management   Notify Licensed Independent Practitioner if interventions unsuccessful or patient reports new pain     Problem: Respiratory - Adult  Goal: Achieves optimal ventilation and oxygenation  4/9/2024 1100 by Kimmie Saha RN  Outcome: Progressing  4/9/2024 0826 by Vera Avila, RT  Outcome: Progressing  4/8/2024 2350 by Yared Odom RN  Outcome: Progressing  Flowsheets (Taken 4/8/2024 0806 by Kimmie Saha RN)  Achieves optimal ventilation and oxygenation:   Assess for changes in respiratory status   Assess for changes in mentation and behavior   Position to facilitate oxygenation and minimize respiratory effort

## 2024-04-09 NOTE — PROGRESS NOTES
Pt sedated with 2mg Versed and 25mcg Fentanyl for JUAN MANUEL (monitored sedation from 1045 to 1108)

## 2024-04-09 NOTE — PROGRESS NOTES
Hospitalist Progress Note    NAME:   Marilee Oakes   : 1950   MRN: [id number]     Date/Time: 2024 8:22 AM  Patient PCP: Monalisa Thorpe LPN    Estimated discharge date:   Barriers: JUAN MANUEL on , final blood cultures and antibiotic recommendation.      Assessment / Plan:  MRSA bacteremia  Recent left ankle ORIF  Fever  Elevated lactic acid  MRSA bacteremia on  as well    Recently Patient developed fever while at rehab and blood cultures were ordered which grew MRSA and was sent to [facility]  LAC - 2.33>0.50  MRSA screen +   Patient underwent a left ankle open reduction into fixation on 3/22 for left ankle fracture. Xray left ankle -  Three views of the left ankle demonstrate interval plate and screw fixation of the fibular fracture with syndesmotic screw. 2 screw fixate the medial malleolar fracture. Fibular fracture demonstrates one half shafts width posterior displacement of the distal fracture fragment. There is less than one half shafts width lateral displacement of the medial malleolar fracture. Bones  are osteopenic. Degenerative changes in the midfoot.  In the emergency department, patient was noted to have small amount of pus coming from left ankle incision according to ER PA which was sent for cultures- LIGHT MRSA   Ortho consulted - advised for NWB , and dressing changes , incisional vac as per wound care .   Ultrasound Doppler of left leg- negative for clots   Infectious disease consulted-  Continue vancomycin per pharmacy, Continue contact precaution.   Patient waiting echo.  : Echo order placed.  Patient reports that her left ankle does not hurt that much.  I spoke with orthopedic PA Robbin Vee and he told me that patient may need to go to the OR for possible washout/debridement of the left ankle tomorrow.  : I spoke with ID Dr. Marino and given her complicated history of bacteremia with MRSA, it is prudent to get a JUAN MANUEL.  Cardiology has been consulted.  Will keep her

## 2024-04-09 NOTE — PROGRESS NOTES
Occupational Therapy    Chart reviewed and interventions attempted. Pt CASSIDY for JUAN MANUEL. Will defer and continue to follow.

## 2024-04-09 NOTE — PROGRESS NOTES
End of Shift Note    Bedside shift change report given to Steffen PINTO RN (oncoming nurse) by Yared Odom RN (offgoing nurse).  Report included the following information SBAR and Kardex    Shift worked:  night     Shift summary and any significant changes:     VS stable, pt c/o no pain, pt voiding with the purewick, pt has a rash on arms, daptomycin started yesterday and vancomycin dc.   Concerns for physician to address:  BUE rash, could be abx side effect.    Zone phone for oncoming shift:   [phone]     Activity:     Number times ambulated in hallways past shift: 0  Number of times OOB to chair past shift: 1    Cardiac:   Cardiac Monitoring: No           Access:  Current line(s): PIV     Genitourinary:   Urinary status: external catheter    Respiratory:      Chronic home O2 use?: YES  Incentive spirometer at bedside: YES       GI:     Current diet:  ADULT ORAL NUTRITION SUPPLEMENT; Breakfast, Lunch, Dinner; Diabetic Oral Supplement  ADULT DIET; Regular; Safety Tray; Safety Tray (Disposables)  Diet NPO  Passing flatus: YES  Tolerating current diet: YES       Pain Management:   Patient states pain is manageable on current regimen: YES    Skin:     Interventions: float heels, increase time out of bed, PT/OT consult, limit briefs, internal/external urinary devices, and nutritional support    Patient Safety:  Fall Score:    Interventions: assistive device (walker, cane. etc), gripper socks, and gait belt       Length of Stay:  Expected LOS: 6  Actual LOS: 5      Yared Odom RN

## 2024-04-09 NOTE — PROGRESS NOTES
TRANSFER - OUT REPORT:    Verbal report given to Kimmie(name) on Marilee Oakes being transferred to Ortho (unit) for routine progression of patient care       Report consisted of patient's Situation, Background, Assessment and   Recommendations(SBAR).     Information from the following report(s) Nurse Handoff Report was reviewed with the receiving nurse.    Opportunity for questions and clarification was provided.      Patient transported with:   Tech

## 2024-04-09 NOTE — PROGRESS NOTES
End of Shift Note    Bedside shift change report given to Yared ALARCON (oncoming nurse) by Kimmie Saha RN (offgoing nurse).  Report included the following information SBAR and Kardex    Shift worked:  days     Shift summary and any significant changes:     Marcus today, oral drink, meds and food tolerated well, resting in bed     Concerns for physician to address:       Zone phone for oncoming shift:   [phone]       Activity:     Number times ambulated in hallways past shift: 0  Number of times OOB to chair past shift: 1    Cardiac:   Cardiac Monitoring: No           Access:  Current line(s): PIV     Genitourinary:   Urinary status: external catheter    Respiratory:      Chronic home O2 use?: YES  Incentive spirometer at bedside: YES       GI:     Current diet:  ADULT DIET; Regular; 3 carb choices (45 gm/meal)  Passing flatus: YES  Tolerating current diet: YES       Pain Management:   Patient states pain is manageable on current regimen: YES    Skin:     Interventions: float heels, increase time out of bed, PT/OT consult, limit briefs, internal/external urinary devices, and nutritional support    Patient Safety:  Fall Score:    Interventions: assistive device (walker, cane. etc), gripper socks, and gait belt       Length of Stay:  Expected LOS: 8  Actual LOS: 6      Kimmie Saha RN

## 2024-04-10 ENCOUNTER — TELEPHONE (OUTPATIENT)
Age: 74
End: 2024-04-10

## 2024-04-10 PROBLEM — T84.7XXA INFECTION OF LOWER EXTREMITY ASSOCIATED WITH HARDWARE (HCC): Status: ACTIVE | Noted: 2024-04-10

## 2024-04-10 LAB
ANION GAP SERPL CALC-SCNC: 4 MMOL/L (ref 5–15)
BUN SERPL-MCNC: 33 MG/DL (ref 6–20)
BUN/CREAT SERPL: 22 (ref 12–20)
CALCIUM SERPL-MCNC: 9.6 MG/DL (ref 8.5–10.1)
CHLORIDE SERPL-SCNC: 103 MMOL/L (ref 97–108)
CO2 SERPL-SCNC: 31 MMOL/L (ref 21–32)
CREAT SERPL-MCNC: 1.48 MG/DL (ref 0.55–1.02)
CRP SERPL-MCNC: 6.89 MG/DL (ref 0–0.3)
ERYTHROCYTE [DISTWIDTH] IN BLOOD BY AUTOMATED COUNT: 16.7 % (ref 11.5–14.5)
ERYTHROCYTE [SEDIMENTATION RATE] IN BLOOD: >140 MM/HR (ref 0–30)
FERRITIN SERPL-MCNC: 70 NG/ML (ref 26–388)
GLUCOSE BLD STRIP.AUTO-MCNC: 161 MG/DL (ref 65–117)
GLUCOSE BLD STRIP.AUTO-MCNC: 213 MG/DL (ref 65–117)
GLUCOSE BLD STRIP.AUTO-MCNC: 224 MG/DL (ref 65–117)
GLUCOSE BLD STRIP.AUTO-MCNC: 242 MG/DL (ref 65–117)
GLUCOSE SERPL-MCNC: 158 MG/DL (ref 65–100)
HCT VFR BLD AUTO: 30.7 % (ref 35–47)
HEMOCCULT STL QL: NEGATIVE
HGB BLD-MCNC: 8.8 G/DL (ref 11.5–16)
IRON SATN MFR SERPL: 8 % (ref 20–50)
IRON SERPL-MCNC: 18 UG/DL (ref 35–150)
MAGNESIUM SERPL-MCNC: 1.9 MG/DL (ref 1.6–2.4)
MCH RBC QN AUTO: 25.1 PG (ref 26–34)
MCHC RBC AUTO-ENTMCNC: 28.7 G/DL (ref 30–36.5)
MCV RBC AUTO: 87.5 FL (ref 80–99)
NRBC # BLD: 0 K/UL (ref 0–0.01)
NRBC BLD-RTO: 0 PER 100 WBC
PHOSPHATE SERPL-MCNC: 4.5 MG/DL (ref 2.6–4.7)
PLATELET # BLD AUTO: 400 K/UL (ref 150–400)
PMV BLD AUTO: 10.3 FL (ref 8.9–12.9)
POTASSIUM SERPL-SCNC: 4.1 MMOL/L (ref 3.5–5.1)
RBC # BLD AUTO: 3.51 M/UL (ref 3.8–5.2)
SERVICE CMNT-IMP: ABNORMAL
SODIUM SERPL-SCNC: 138 MMOL/L (ref 136–145)
TIBC SERPL-MCNC: 213 UG/DL (ref 250–450)
WBC # BLD AUTO: 7.7 K/UL (ref 3.6–11)

## 2024-04-10 PROCEDURE — 83550 IRON BINDING TEST: CPT

## 2024-04-10 PROCEDURE — 97530 THERAPEUTIC ACTIVITIES: CPT

## 2024-04-10 PROCEDURE — 85027 COMPLETE CBC AUTOMATED: CPT

## 2024-04-10 PROCEDURE — 80048 BASIC METABOLIC PNL TOTAL CA: CPT

## 2024-04-10 PROCEDURE — 97110 THERAPEUTIC EXERCISES: CPT

## 2024-04-10 PROCEDURE — 2700000000 HC OXYGEN THERAPY PER DAY

## 2024-04-10 PROCEDURE — 36415 COLL VENOUS BLD VENIPUNCTURE: CPT

## 2024-04-10 PROCEDURE — 94761 N-INVAS EAR/PLS OXIMETRY MLT: CPT

## 2024-04-10 PROCEDURE — 99233 SBSQ HOSP IP/OBS HIGH 50: CPT | Performed by: INTERNAL MEDICINE

## 2024-04-10 PROCEDURE — 94760 N-INVAS EAR/PLS OXIMETRY 1: CPT

## 2024-04-10 PROCEDURE — 1100000003 HC PRIVATE W/ TELEMETRY

## 2024-04-10 PROCEDURE — 2580000003 HC RX 258: Performed by: INTERNAL MEDICINE

## 2024-04-10 PROCEDURE — 86140 C-REACTIVE PROTEIN: CPT

## 2024-04-10 PROCEDURE — 83540 ASSAY OF IRON: CPT

## 2024-04-10 PROCEDURE — 82728 ASSAY OF FERRITIN: CPT

## 2024-04-10 PROCEDURE — 83735 ASSAY OF MAGNESIUM: CPT

## 2024-04-10 PROCEDURE — 97535 SELF CARE MNGMENT TRAINING: CPT

## 2024-04-10 PROCEDURE — 6370000000 HC RX 637 (ALT 250 FOR IP): Performed by: INTERNAL MEDICINE

## 2024-04-10 PROCEDURE — 85652 RBC SED RATE AUTOMATED: CPT

## 2024-04-10 PROCEDURE — 82962 GLUCOSE BLOOD TEST: CPT

## 2024-04-10 PROCEDURE — 84100 ASSAY OF PHOSPHORUS: CPT

## 2024-04-10 PROCEDURE — 94640 AIRWAY INHALATION TREATMENT: CPT

## 2024-04-10 PROCEDURE — 6360000002 HC RX W HCPCS: Performed by: INTERNAL MEDICINE

## 2024-04-10 PROCEDURE — 82272 OCCULT BLD FECES 1-3 TESTS: CPT

## 2024-04-10 PROCEDURE — 6360000002 HC RX W HCPCS: Performed by: STUDENT IN AN ORGANIZED HEALTH CARE EDUCATION/TRAINING PROGRAM

## 2024-04-10 RX ADMIN — CARVEDILOL 25 MG: 12.5 TABLET, FILM COATED ORAL at 10:17

## 2024-04-10 RX ADMIN — INSULIN LISPRO 1 UNITS: 100 INJECTION, SOLUTION INTRAVENOUS; SUBCUTANEOUS at 13:22

## 2024-04-10 RX ADMIN — ENOXAPARIN SODIUM 30 MG: 100 INJECTION SUBCUTANEOUS at 00:04

## 2024-04-10 RX ADMIN — Medication 400 MG: at 00:04

## 2024-04-10 RX ADMIN — Medication: at 00:03

## 2024-04-10 RX ADMIN — Medication: at 10:12

## 2024-04-10 RX ADMIN — BUMETANIDE 2 MG: 1 TABLET ORAL at 10:18

## 2024-04-10 RX ADMIN — ARFORMOTEROL TARTRATE: 15 SOLUTION RESPIRATORY (INHALATION) at 08:58

## 2024-04-10 RX ADMIN — CLONAZEPAM 0.5 MG: 0.5 TABLET ORAL at 21:30

## 2024-04-10 RX ADMIN — ENOXAPARIN SODIUM 30 MG: 100 INJECTION SUBCUTANEOUS at 21:30

## 2024-04-10 RX ADMIN — ASPIRIN 81 MG: 81 TABLET, CHEWABLE ORAL at 10:18

## 2024-04-10 RX ADMIN — DAPTOMYCIN 650 MG: 500 INJECTION, POWDER, LYOPHILIZED, FOR SOLUTION INTRAVENOUS at 13:23

## 2024-04-10 RX ADMIN — POTASSIUM CHLORIDE 10 MEQ: 1500 TABLET, EXTENDED RELEASE ORAL at 10:18

## 2024-04-10 RX ADMIN — CARVEDILOL 25 MG: 12.5 TABLET, FILM COATED ORAL at 18:03

## 2024-04-10 RX ADMIN — Medication 400 MG: at 10:18

## 2024-04-10 RX ADMIN — ENOXAPARIN SODIUM 30 MG: 100 INJECTION SUBCUTANEOUS at 10:36

## 2024-04-10 RX ADMIN — INSULIN LISPRO 1 UNITS: 100 INJECTION, SOLUTION INTRAVENOUS; SUBCUTANEOUS at 18:03

## 2024-04-10 RX ADMIN — ISOSORBIDE MONONITRATE 60 MG: 30 TABLET, EXTENDED RELEASE ORAL at 10:18

## 2024-04-10 RX ADMIN — Medication: at 15:00

## 2024-04-10 RX ADMIN — SODIUM CHLORIDE, PRESERVATIVE FREE 10 ML: 5 INJECTION INTRAVENOUS at 00:04

## 2024-04-10 RX ADMIN — SODIUM CHLORIDE, PRESERVATIVE FREE 10 ML: 5 INJECTION INTRAVENOUS at 21:49

## 2024-04-10 RX ADMIN — PANTOPRAZOLE SODIUM 40 MG: 40 TABLET, DELAYED RELEASE ORAL at 06:58

## 2024-04-10 RX ADMIN — MUPIROCIN: 20 OINTMENT TOPICAL at 21:49

## 2024-04-10 RX ADMIN — Medication: at 18:11

## 2024-04-10 RX ADMIN — Medication: at 21:49

## 2024-04-10 RX ADMIN — PREGABALIN 300 MG: 150 CAPSULE ORAL at 10:18

## 2024-04-10 RX ADMIN — CLONAZEPAM 0.5 MG: 0.5 TABLET ORAL at 00:04

## 2024-04-10 RX ADMIN — SODIUM CHLORIDE, PRESERVATIVE FREE 10 ML: 5 INJECTION INTRAVENOUS at 10:18

## 2024-04-10 RX ADMIN — Medication 400 MG: at 21:48

## 2024-04-10 NOTE — PLAN OF CARE
Problem: Physical Therapy - Adult  Goal: By Discharge: Performs mobility at highest level of function for planned discharge setting.  See evaluation for individualized goals.  Description: FUNCTIONAL STATUS PRIOR TO ADMISSION: Pt comes from [facility] at [facility] s/p having fall with L ankle fx ORIF done on 3/23/24; now to ED from rehab with fever, MRSA and now with wound vac L ankle; pt is NWB and was working on scooting, lateral transfers, and standing at rehab to this point    HOME SUPPORT PRIOR TO ADMISSION: was at [facility]    Physical Therapy Goals  Initiated 4/5/2024  1.  Patient will move from supine to sit and sit to supine, scoot up and down, and roll side to side in bed with supervision/set-up within 7 day(s).    2.  Patient will perform sit to stand with moderate assistance within 7 day(s).  3.  Patient will transfer from bed to chair and chair to bed with contact guard assist using the least restrictive device/lateral scoot within 7 day(s).      Outcome: Progressing   PHYSICAL THERAPY TREATMENT    Patient: Marilee Oakes (73 y.o. female)  Date: 4/10/2024  Diagnosis: Acute systolic heart failure (HCC) [I50.21]  Surgical wound infection [T81.49XA]  MRSA bacteremia [R78.81, B95.62]  Complication of procedure, initial encounter [T81.9XXA] MRSA bacteremia      Precautions: Contact Precautions, Other (comment), Weight Bearing, Fall Risk, Bed Alarm (MRSA, now with L ankle wound vac)   Left Lower Extremity Weight Bearing: Non Weight Bearing                  ASSESSMENT:  Patient continues to benefit from skilled PT services and is progressing towards goals. Patient is pleasant and agreeable to transfer via lateral transfer to the chair (drop arm recliner at bedside)  Lisa attempted without sliding boad but pt had trouble and actually had a bowel movement during transfer.  Pt slid back onto bed cleaned up bed and floor and retrieved sliding board.  Pt then transferred to chair via sliding board and max A x 2.  Pt then  did LE ex in chair sitting.  Patient is not pleased about having to maybe go back to [facility] - neither does she want to go to a SNF.  According to Mike JORDAN it seems possible (from x ray) that she may have put some weight on L NWB side as fixation does not look great compared to when it was repaired.  Thus if she returns there this (NWB) must be emphasized. Patient left comfortable in chair with  present and call bell.         PLAN:  Patient continues to benefit from skilled intervention to address the above impairments.  Continue treatment per established plan of care.    Recommend with staff: lateral transfer to chair with sliding board    Recommend next PT session: transfer to chair with sliding board and bed mobility supine to sit    Recommendation for discharge: (in order for the patient to meet his/her long term goals): Therapy 3 hours/day 5-7 days/week    Other factors to consider for discharge: high risk for falls, new weight bearing restrictions limiting activity or patient is unable to maintain, and no additional factors    IF patient discharges home will need the following DME: NA - SNF or IPR       SUBJECTIVE:   Patient stated, \"I don't want to go back to [facility] its too hard!  Pt concerned about one particular person and this PT suggested that she address this issue directly..\"    OBJECTIVE DATA SUMMARY:   Critical Behavior:      No issues    Functional Mobility Training:  Bed Mobility:  Bed Mobility Training  Overall Level of Assistance: Maximum assistance;Assist X2 (for lateral sagarfer)  Interventions: Verbal cues;Tactile cues;Weight shifting training/pressure relief  Sit to Supine: Moderate assistance  Scooting: Contact-guard assistance  Transfers:  Transfer Training  Transfer Training: Yes  Overall Level of Assistance: Maximum assistance;Assist X2  Interventions: Verbal cues;Tactile cues;Weight shifting training/pressure relief  Sit to Stand:  (unable)  Bed to Chair: Assist

## 2024-04-10 NOTE — PROGRESS NOTES
ID  Discussed all ID related issues with spouse of patient.  Advised that patient currently has a complicated infection that could be involving  Orthopedic hardware and require 6 weeks of antibiotic therapy IV,  Followed by oral suppressive therapy for 1 to 3 to 6 months as tolerated  All questions answered.

## 2024-04-10 NOTE — PROGRESS NOTES
Infectious Disease progress          Impression    MRSA bacteremia  Blood cultures + for MRSA at SNF  Details unavailable at this time  Blood cultures 4/3-no growth.  JUAN MANUEL negative, appreciate cardiollogy input    Bimalleolar left ankle fracture  S/p ORIF L/ankle fracture 3/23  Swelling + & serosanguinous drainage at surgical site  S/p incisional wound VAC placement 4/4.  Wound culture 4/3+ for MRSA  Culture reported from drainage at surgical site.    CAD  Acute on chronic systolic CHF  A.fib  Watchmen present.  Retained RV pacing lead+.      HILARY on CKD 3  Worsening Cr 1.38      Diabetes Mellitus 2   On SSI   A1c 5.8      COPD  On 2 L  LEELA on CPAP    Plan  Daptomycin 8mg/ kg IV x 6 weeks  No statin on Daptomycin please  Adequate fluids, daily probiotic  Elevate LLE  MRSA decolonization-  Nasal mupirocin, Hibiclens skin wipes  No surgery per Ortho.   Will see wound with dressing change tomorrow       Antimicrobial orders for discharge  -Daptomycin 8 mg by KG IV daily  end date 5/15  -Pull line at end of therapy.    -No statin while on daptomycin  -Weekly CBC, CMP, CK & ESR/CRP every other week-  -Fax reports to [phone], call with critical labs  at [phone]/ [phone]  -Encourage adequate fluids, daily probiotic/yogurt  -If line malfunction occurs and home health cannot reposition  please send patient to ED immediately  -ID follow-up -5/14 at 2 PM  - If persistent side effects occur stop antibiotic and call-ID/PCP              Extensive review of chart notes, labs, imaging, cultures done  Additionally review of done: Recent reports-Labs, cultures, imaging  D/w -hospitalist, RN  Patient seen today.  Laying in bed.  Denies complaints.Wound dresssing +    Past Medical History:   Diagnosis Date    Age-related osteoporosis without current pathological fracture     Anxiety and depression 01/22/2018    Arthritis     OA    Asthma     CAD (coronary artery disease)     Chronic systolic heart failure (HCC)     CKD stage G3b/A1,

## 2024-04-10 NOTE — PROGRESS NOTES
Hospitalist Progress Note    NAME:   Marilee Oakes   : 1950   MRN: [id number]     Date/Time: 4/10/2024 3:37 PM  Patient PCP: Monalisa Thorpe LPN    Estimated discharge date:   Barriers: ID recommendation.      Assessment / Plan:  MRSA bacteremia  Recent left ankle ORIF  Fever  Elevated lactic acid  MRSA bacteremia on  as well    Recently Patient developed fever while at rehab and blood cultures were ordered which grew MRSA and was sent to [facility]  LAC - 2.33>0.50  MRSA screen +   Patient underwent a left ankle open reduction into fixation on 3/22 for left ankle fracture. Xray left ankle -  Three views of the left ankle demonstrate interval plate and screw fixation of the fibular fracture with syndesmotic screw. 2 screw fixate the medial malleolar fracture. Fibular fracture demonstrates one half shafts width posterior displacement of the distal fracture fragment. There is less than one half shafts width lateral displacement of the medial malleolar fracture. Bones  are osteopenic. Degenerative changes in the midfoot.  In the emergency department, patient was noted to have small amount of pus coming from left ankle incision according to ER PA which was sent for cultures- LIGHT MRSA   Ortho consulted - advised for NWB , and dressing changes , incisional vac as per wound care .   Ultrasound Doppler of left leg- negative for clots   Infectious disease consulted-  Continue vancomycin per pharmacy, Continue contact precaution.   Patient waiting echo.  : Echo order placed.  Patient reports that her left ankle does not hurt that much.  I spoke with orthopedic PA Robbin Vee and he told me that patient may need to go to the OR for possible washout/debridement of the left ankle tomorrow.  : I spoke with ID Dr. Marino and given her complicated history of bacteremia with MRSA, it is prudent to get a JUAN MANUEL.  Cardiology has been consulted.  Will keep her n.p.o. after midnight for the same.  The

## 2024-04-10 NOTE — WOUND CARE
Wound care nurse will reassess the ankle / lower leg wounds on Thursday and remove the Prevena incisional Vac. Will see the wound tomorrow with the ID physician.   Verónica Collins RN, BSN, CWON

## 2024-04-10 NOTE — PLAN OF CARE
Problem: Skin/Tissue Integrity  Goal: Absence of new skin breakdown  Description: 1.  Monitor for areas of redness and/or skin breakdown  2.  Assess vascular access sites hourly  3.  Every 4-6 hours minimum:  Change oxygen saturation probe site  4.  Every 4-6 hours:  If on nasal continuous positive airway pressure, respiratory therapy assess nares and determine need for appliance change or resting period.  Outcome: Progressing     Problem: Safety - Adult  Goal: Free from fall injury  Outcome: Progressing  Flowsheets (Taken 4/9/2024 0815 by Kimmie Saha, RN)  Free From Fall Injury: Instruct family/caregiver on patient safety     Problem: ABCDS Injury Assessment  Goal: Absence of physical injury  Outcome: Progressing  Flowsheets (Taken 4/5/2024 0027)  Absence of Physical Injury: Implement safety measures based on patient assessment     Problem: Chronic Conditions and Co-morbidities  Goal: Patient's chronic conditions and co-morbidity symptoms are monitored and maintained or improved  Outcome: Progressing  Flowsheets (Taken 4/9/2024 0815 by Kimmie Saha, RN)  Care Plan - Patient's Chronic Conditions and Co-Morbidity Symptoms are Monitored and Maintained or Improved: Monitor and assess patient's chronic conditions and comorbid symptoms for stability, deterioration, or improvement     Problem: Pain  Goal: Verbalizes/displays adequate comfort level or baseline comfort level  Outcome: Progressing  Flowsheets (Taken 4/5/2024 0027)  Verbalizes/displays adequate comfort level or baseline comfort level:   Encourage patient to monitor pain and request assistance   Assess pain using appropriate pain scale   Administer analgesics based on type and severity of pain and evaluate response   Implement non-pharmacological measures as appropriate and evaluate response   Consider cultural and social influences on pain and pain management   Notify Licensed Independent Practitioner if interventions unsuccessful or patient reports

## 2024-04-10 NOTE — CARE COORDINATION
[facility] anticipates pt returning when she has reached medical stability and is cleared to return for rehab. Pt aware and in agreement with this plan.    Corie Espino, [facility]  [facility] Management  [phone]

## 2024-04-10 NOTE — PLAN OF CARE
Problem: Occupational Therapy - Adult  Goal: By Discharge: Performs self-care activities at highest level of function for planned discharge setting.  See evaluation for individualized goals.  Description: FUNCTIONAL STATUS PRIOR TO ADMISSION:  At true baseline, patient generally modified independent with ADLs and functional mobility using rollator. She does benefit from supervision for shower transfers. Patient participates in light home management ADLs. Note recent admission and L ankle ORIF on 3/22 following fracture; patient discharged to [facility].     Receives Help From: Family ( assists with IADLs when needed), ADL Assistance: Independent, Ambulation Assistance: Needs assistance,  , Active : No (active  at baseline, not recently)     HOME SUPPORT: Patient lived with supportive spouse.    Occupational Therapy Goals:  Initiated 4/5/2024  1.  Patient will perform adhere to NWB LLE throughout functional activity/mobility with minimal verbal cues within 7 day(s).  2.  Patient will perform upper body dressing and bathing with Supervision within 7 day(s).  3.  Patient will perform seated lower body bathing using lateral weight-shifting technique with Minimal Assist within 7 day(s).  4.  Patient will perform toilet transfers to bariatric drop-arm [facility] with Supervision / Set-up within 7 day(s).  5.  Patient will perform all aspects of seated toileting with Minimal Assist within 7 day(s).  6.  Patient will participate in upper extremity therapeutic exercise/activities with Supervision for 10 minutes within 7 day(s).    7.  Patient will utilize energy conservation techniques during functional activities with verbal cues within 7 day(s).  Outcome: Progressing   OCCUPATIONAL THERAPY TREATMENT  Patient: Marilee Oakes (73 y.o. female)  Date: 4/10/2024  Primary Diagnosis: Acute systolic heart failure (HCC) [I50.21]  Surgical wound infection [T81.49XA]  MRSA bacteremia [R78.81, B95.62]  Complication of

## 2024-04-10 NOTE — PLAN OF CARE
Problem: Skin/Tissue Integrity  Goal: Absence of new skin breakdown  Description: 1.  Monitor for areas of redness and/or skin breakdown  2.  Assess vascular access sites hourly  3.  Every 4-6 hours minimum:  Change oxygen saturation probe site  4.  Every 4-6 hours:  If on nasal continuous positive airway pressure, respiratory therapy assess nares and determine need for appliance change or resting period.  Outcome: Progressing     Problem: Safety - Adult  Goal: Free from fall injury  Outcome: Progressing     Problem: ABCDS Injury Assessment  Goal: Absence of physical injury  Outcome: Progressing     Problem: Chronic Conditions and Co-morbidities  Goal: Patient's chronic conditions and co-morbidity symptoms are monitored and maintained or improved  Outcome: Progressing     Problem: Pain  Goal: Verbalizes/displays adequate comfort level or baseline comfort level  Outcome: Progressing

## 2024-04-10 NOTE — PROGRESS NOTES
End of Shift Note    Bedside shift change report given to Clifton RN (oncoming nurse) by Yared Odom RN (offgoing nurse).  Report included the following information SBAR and Kardex    Shift worked:  night     Shift summary and any significant changes:     VS stable, pt c/o no pain, pt voiding with the purewick. Large BM this am.   Concerns for physician to address:  none   Zone phone for oncoming shift:   [phone]     Activity:     Number times ambulated in hallways past shift: 0  Number of times OOB to chair past shift: 1    Cardiac:   Cardiac Monitoring: No           Access:  Current line(s): PIV     Genitourinary:   Urinary status: external catheter    Respiratory:      Chronic home O2 use?: YES  Incentive spirometer at bedside: YES       GI:     Current diet:  ADULT DIET; Regular; 3 carb choices (45 gm/meal)  Passing flatus: YES  Tolerating current diet: YES       Pain Management:   Patient states pain is manageable on current regimen: YES    Skin:     Interventions: float heels, increase time out of bed, PT/OT consult, limit briefs, internal/external urinary devices, and nutritional support    Patient Safety:  Fall Score:    Interventions: assistive device (walker, cane. etc), gripper socks, and gait belt       Length of Stay:  Expected LOS: 8  Actual LOS: 7      Yared Odom, RN

## 2024-04-11 LAB
CK SERPL-CCNC: 22 U/L (ref 26–192)
GLUCOSE BLD STRIP.AUTO-MCNC: 133 MG/DL (ref 65–117)
GLUCOSE BLD STRIP.AUTO-MCNC: 141 MG/DL (ref 65–117)
GLUCOSE BLD STRIP.AUTO-MCNC: 172 MG/DL (ref 65–117)
GLUCOSE BLD STRIP.AUTO-MCNC: 188 MG/DL (ref 65–117)
SERVICE CMNT-IMP: ABNORMAL

## 2024-04-11 PROCEDURE — 1100000003 HC PRIVATE W/ TELEMETRY

## 2024-04-11 PROCEDURE — 82550 ASSAY OF CK (CPK): CPT

## 2024-04-11 PROCEDURE — 6370000000 HC RX 637 (ALT 250 FOR IP): Performed by: INTERNAL MEDICINE

## 2024-04-11 PROCEDURE — 94761 N-INVAS EAR/PLS OXIMETRY MLT: CPT

## 2024-04-11 PROCEDURE — 2700000000 HC OXYGEN THERAPY PER DAY

## 2024-04-11 PROCEDURE — 97530 THERAPEUTIC ACTIVITIES: CPT | Performed by: PHYSICAL THERAPIST

## 2024-04-11 PROCEDURE — 2580000003 HC RX 258: Performed by: INTERNAL MEDICINE

## 2024-04-11 PROCEDURE — 6360000002 HC RX W HCPCS: Performed by: INTERNAL MEDICINE

## 2024-04-11 PROCEDURE — 94640 AIRWAY INHALATION TREATMENT: CPT

## 2024-04-11 PROCEDURE — 97535 SELF CARE MNGMENT TRAINING: CPT

## 2024-04-11 PROCEDURE — 82962 GLUCOSE BLOOD TEST: CPT

## 2024-04-11 PROCEDURE — 6360000002 HC RX W HCPCS: Performed by: STUDENT IN AN ORGANIZED HEALTH CARE EDUCATION/TRAINING PROGRAM

## 2024-04-11 PROCEDURE — 36415 COLL VENOUS BLD VENIPUNCTURE: CPT

## 2024-04-11 RX ADMIN — Medication: at 12:25

## 2024-04-11 RX ADMIN — DAPTOMYCIN 650 MG: 500 INJECTION, POWDER, LYOPHILIZED, FOR SOLUTION INTRAVENOUS at 12:23

## 2024-04-11 RX ADMIN — Medication: at 10:43

## 2024-04-11 RX ADMIN — Medication: at 21:21

## 2024-04-11 RX ADMIN — Medication 400 MG: at 10:26

## 2024-04-11 RX ADMIN — ENOXAPARIN SODIUM 30 MG: 100 INJECTION SUBCUTANEOUS at 10:26

## 2024-04-11 RX ADMIN — SODIUM CHLORIDE, PRESERVATIVE FREE 10 ML: 5 INJECTION INTRAVENOUS at 21:22

## 2024-04-11 RX ADMIN — Medication: at 21:22

## 2024-04-11 RX ADMIN — ACETAMINOPHEN 650 MG: 325 TABLET ORAL at 10:24

## 2024-04-11 RX ADMIN — CARVEDILOL 25 MG: 12.5 TABLET, FILM COATED ORAL at 18:41

## 2024-04-11 RX ADMIN — ISOSORBIDE MONONITRATE 60 MG: 30 TABLET, EXTENDED RELEASE ORAL at 10:26

## 2024-04-11 RX ADMIN — MUPIROCIN: 20 OINTMENT TOPICAL at 10:26

## 2024-04-11 RX ADMIN — CLONAZEPAM 0.5 MG: 0.5 TABLET ORAL at 21:20

## 2024-04-11 RX ADMIN — ARFORMOTEROL TARTRATE: 15 SOLUTION RESPIRATORY (INHALATION) at 09:37

## 2024-04-11 RX ADMIN — MUPIROCIN: 20 OINTMENT TOPICAL at 21:21

## 2024-04-11 RX ADMIN — CARVEDILOL 25 MG: 12.5 TABLET, FILM COATED ORAL at 10:25

## 2024-04-11 RX ADMIN — ENOXAPARIN SODIUM 30 MG: 100 INJECTION SUBCUTANEOUS at 21:20

## 2024-04-11 RX ADMIN — Medication 400 MG: at 21:20

## 2024-04-11 RX ADMIN — POTASSIUM CHLORIDE 10 MEQ: 1500 TABLET, EXTENDED RELEASE ORAL at 10:25

## 2024-04-11 RX ADMIN — SODIUM CHLORIDE, PRESERVATIVE FREE 10 ML: 5 INJECTION INTRAVENOUS at 10:37

## 2024-04-11 RX ADMIN — ASPIRIN 81 MG: 81 TABLET, CHEWABLE ORAL at 10:25

## 2024-04-11 RX ADMIN — BUMETANIDE 2 MG: 1 TABLET ORAL at 10:26

## 2024-04-11 RX ADMIN — PANTOPRAZOLE SODIUM 40 MG: 40 TABLET, DELAYED RELEASE ORAL at 05:02

## 2024-04-11 RX ADMIN — PREGABALIN 300 MG: 150 CAPSULE ORAL at 10:26

## 2024-04-11 ASSESSMENT — PAIN SCALES - GENERAL
PAINLEVEL_OUTOF10: 6
PAINLEVEL_OUTOF10: 7
PAINLEVEL_OUTOF10: 0

## 2024-04-11 ASSESSMENT — PAIN DESCRIPTION - LOCATION: LOCATION: LEG

## 2024-04-11 ASSESSMENT — PAIN - FUNCTIONAL ASSESSMENT: PAIN_FUNCTIONAL_ASSESSMENT: PREVENTS OR INTERFERES SOME ACTIVE ACTIVITIES AND ADLS

## 2024-04-11 ASSESSMENT — PAIN DESCRIPTION - DESCRIPTORS: DESCRIPTORS: ACHING

## 2024-04-11 ASSESSMENT — PAIN DESCRIPTION - ORIENTATION: ORIENTATION: RIGHT;LEFT

## 2024-04-11 NOTE — WOUND CARE
Wound Care consult to reassess the wound on the left lower leg  today. This is a surgical sutured wound that has had a Prevena incisional wound Vac on it for one week today. Pt. Has orthopedic hardware in place and the surgical isite continues to drain copiously at times.   The legs are both edematous.   Pt. Has a recent history of MRSA bacteremia and a recent left ankle open reduction / internal fixation.      Admission photo       Today the wound is still draining but about 50% of it is approximated well and appears to be healing.   The site is macerated despite the Wound Vac due to the edema, although the edema has decreased some over the last week.   The drainage from the wound is serous-sanguinous (mostly serous). No s/sx of pus, redness or heat. Only excess edema - type drainage from venous stasis. Pt. Has the typical hemosiderin staining consistent with the venous stasis.   Plan: Continue the Pressure injury prevention and the wound care as written. May need to adjust the amount of dressing changes due to the overflow of the leg edema.   Will consider a compression therapy if we can get an DESHAUN test results done and they are WNL. Need to make sure she has no arterial disease.   Verónica Collins, RN, BSN, CWON

## 2024-04-11 NOTE — PLAN OF CARE
Problem: Occupational Therapy - Adult  Goal: By Discharge: Performs self-care activities at highest level of function for planned discharge setting.  See evaluation for individualized goals.  Description: FUNCTIONAL STATUS PRIOR TO ADMISSION:  At true baseline, patient generally modified independent with ADLs and functional mobility using rollator. She does benefit from supervision for shower transfers. Patient participates in light home management ADLs. Note recent admission and L ankle ORIF on 3/22 following fracture; patient discharged to [facility].     Receives Help From: Family ( assists with IADLs when needed), ADL Assistance: Independent, Ambulation Assistance: Needs assistance,  , Active : No (active  at baseline, not recently)     HOME SUPPORT: Patient lived with supportive spouse.    Occupational Therapy Goals:  Initiated 4/5/2024  1.  Patient will perform adhere to NWB LLE throughout functional activity/mobility with minimal verbal cues within 7 day(s).  2.  Patient will perform upper body dressing and bathing with Supervision within 7 day(s).  3.  Patient will perform seated lower body bathing using lateral weight-shifting technique with Minimal Assist within 7 day(s).  4.  Patient will perform toilet transfers to bariatric drop-arm [facility] with Supervision / Set-up within 7 day(s).  5.  Patient will perform all aspects of seated toileting with Minimal Assist within 7 day(s).  6.  Patient will participate in upper extremity therapeutic exercise/activities with Supervision for 10 minutes within 7 day(s).    7.  Patient will utilize energy conservation techniques during functional activities with verbal cues within 7 day(s).  Outcome: Progressing   OCCUPATIONAL THERAPY TREATMENT  Patient: Marilee Oakes (73 y.o. female)  Date: 4/11/2024  Primary Diagnosis: Acute systolic heart failure (HCC) [I50.21]  Surgical wound infection [T81.49XA]  MRSA bacteremia [R78.81, B95.62]  Complication of

## 2024-04-11 NOTE — PROGRESS NOTES
Occupational Therapy    Chart reviewed and interventions attempted. Wound care at bedside. Will defer and continue for follow.

## 2024-04-11 NOTE — PLAN OF CARE
Problem: Skin/Tissue Integrity  Goal: Absence of new skin breakdown  Description: 1.  Monitor for areas of redness and/or skin breakdown  2.  Assess vascular access sites hourly  3.  Every 4-6 hours minimum:  Change oxygen saturation probe site  4.  Every 4-6 hours:  If on nasal continuous positive airway pressure, respiratory therapy assess nares and determine need for appliance change or resting period.  4/10/2024 2259 by Aleyda Phillips RN  Outcome: Progressing  4/10/2024 1555 by Jenny Mock RN  Outcome: Progressing     Problem: Safety - Adult  Goal: Free from fall injury  4/10/2024 2259 by Aleyda Phillips RN  Outcome: Progressing  4/10/2024 1555 by Jenny Mock RN  Outcome: Progressing     Problem: ABCDS Injury Assessment  Goal: Absence of physical injury  4/10/2024 2259 by Aleyda Phillips RN  Outcome: Progressing  4/10/2024 1555 by Jenny Mock RN  Outcome: Progressing     Problem: Chronic Conditions and Co-morbidities  Goal: Patient's chronic conditions and co-morbidity symptoms are monitored and maintained or improved  4/10/2024 1555 by Jenny Mock RN  Outcome: Progressing     Problem: Pain  Goal: Verbalizes/displays adequate comfort level or baseline comfort level  4/10/2024 1555 by Jenny Mock RN  Outcome: Progressing     Problem: Respiratory - Adult  Goal: Achieves optimal ventilation and oxygenation  4/10/2024 1555 by Jenny Mock RN  Outcome: Progressing  4/10/2024 1027 by Marva Ramos RT  Outcome: Progressing     Problem: Physical Therapy - Adult  Goal: By Discharge: Performs mobility at highest level of function for planned discharge setting.  See evaluation for individualized goals.  Description: FUNCTIONAL STATUS PRIOR TO ADMISSION: Pt comes from I[facility] at [facility] s/p having fall with L ankle fx ORIF done on 3/23/24; now to ED from rehab with fever, MRSA and now with wound vac L ankle; pt is NWB and was working on scooting, lateral transfers, and standing at rehab  toileting with Minimal Assist within 7 day(s).  6.  Patient will participate in upper extremity therapeutic exercise/activities with Supervision for 10 minutes within 7 day(s).    7.  Patient will utilize energy conservation techniques during functional activities with verbal cues within 7 day(s).  4/10/2024 1300 by Savannah Gambino, OT  Outcome: Progressing

## 2024-04-11 NOTE — PROGRESS NOTES
End of Shift Note    Bedside shift change report given to AGNES Alvarado/Joel RN (oncoming nurse) by Aleyda Phillips RN (offgoing nurse).  Report included the following information SBAR, Kardex, and MAR    Shift worked:  7p-7a     Shift summary and any significant changes:     No significant changes overnight.     Concerns for physician to address:  none     Zone phone for oncoming shift:   [phone]       Activity:     Number times ambulated in hallways past shift: 0  Number of times OOB to chair past shift: 0    Cardiac:   Cardiac Monitoring: No           Access:  Current line(s): PIV     Genitourinary:   Urinary status: voiding and external catheter    Respiratory:      Chronic home O2 use?: yes  Incentive spirometer at bedside: NO       GI:     Current diet:  ADULT DIET; Regular; 3 carb choices (45 gm/meal)  Passing flatus: YES  Tolerating current diet: YES       Pain Management:   Patient states pain is manageable on current regimen: YES    Skin:     Interventions: turn team, specialty bed, float heels, and increase time out of bed    Patient Safety:  Fall Score:    Interventions: bed/chair alarm, assistive device (walker, cane. etc), gripper socks, and pt to call before getting OOB       Length of Stay:  Expected LOS: 8  Actual LOS: 8      Aleyda Phillips RN

## 2024-04-11 NOTE — PROGRESS NOTES
Infectious Disease progress          Impression    MRSA bacteremia  Blood cultures + for MRSA at SNF  Details unavailable at this time  Blood cultures 4/3-no growth.  JUAN MANUEL negative, appreciate cardiollogy input    Bimalleolar left ankle fracture  S/p ORIF L/ankle fracture 3/23  Swelling + & serosanguinous drainage at surgical site  S/p incisional wound VAC placement 4/4.  Wound culture 4/3+ for MRSA  Culture reported from drainage at surgical site.  Surgical site  wound exam done with Wound Care   Wound still draining serous fluid( see photo)  D/w Ortho PA.    CAD  Acute on chronic systolic CHF  A.fib  Watchmen present.  Retained RV pacing lead+.      HILARY on CKD 3  Worsening Cr 1.38      Diabetes Mellitus 2   On SSI   A1c 5.8      COPD  On 2 L  LEELA on CPAP    Plan  Daptomycin 8mg/ kg IV x 6 weeks  No statin on Daptomycin please  Adequate fluids, daily probiotic  Elevate LLE  MRSA decolonization-  Nasal mupirocin, Hibiclens skin wipes   Wound Care per Wound care Team      Antimicrobial orders for discharge  -Daptomycin 8 mg by KG IV daily  end date 5/15  -Pull line at end of therapy.    -No statin while on daptomycin  -Weekly CBC, CMP, CK & ESR/CRP every other week-  -Fax reports to [phone], call with critical labs  at [phone]/ [phone]  -Encourage adequate fluids, daily probiotic/yogurt  -If line malfunction occurs and home health cannot reposition  please send patient to ED immediately  -ID follow-up -5/14 at 2 PM  - If persistent side effects occur stop antibiotic and call-ID/PCP              Extensive review of chart notes, labs, imaging, cultures done  Additionally review of done: Recent reports-Labs, cultures, imaging  D/w -hospitalist, RN  Patient seen today.  Laying in bed.  Denies new  complaints.  LLE exam done . Still swollen, serous drainage +        Past Medical History:   Diagnosis Date    Age-related osteoporosis without current pathological fracture     Anxiety and depression 01/22/2018    Arthritis   ANKLE LEFT (MIN 3 VIEWS) INDICATION: trauma. COMPARISON: None. FINDINGS: Three views of the left ankle demonstrate a mildly displaced bimalleolar ankle fracture with overlying soft tissue swelling. The ankle mortise appears grossly intact..      Mildly displaced bimalleolar ankle fracture with overlying soft tissue swelling.      Mariela Marino MD FACP

## 2024-04-11 NOTE — PLAN OF CARE
Problem: Physical Therapy - Adult  Goal: By Discharge: Performs mobility at highest level of function for planned discharge setting.  See evaluation for individualized goals.  Description: FUNCTIONAL STATUS PRIOR TO ADMISSION: Pt comes from [facility] at [facility] s/p having fall with L ankle fx ORIF done on 3/23/24; now to ED from rehab with fever, MRSA and now with wound vac L ankle; pt is NWB and was working on scooting, lateral transfers, and standing at rehab to this point    HOME SUPPORT PRIOR TO ADMISSION: was at [facility]    Physical Therapy Goals  Initiated 4/5/2024  1.  Patient will move from supine to sit and sit to supine, scoot up and down, and roll side to side in bed with supervision/set-up within 7 day(s).    2.  Patient will perform sit to stand with moderate assistance within 7 day(s).  3.  Patient will transfer from bed to chair and chair to bed with contact guard assist using the least restrictive device/lateral scoot within 7 day(s).      Outcome: Progressing   PHYSICAL THERAPY TREATMENT    Patient: Marilee Oakes (73 y.o. female)  Date: 4/11/2024  Diagnosis: Acute systolic heart failure (HCC) [I50.21]  Surgical wound infection [T81.49XA]  MRSA bacteremia [R78.81, B95.62]  Complication of procedure, initial encounter [T81.9XXA] MRSA bacteremia      Precautions: Contact Precautions, Other (comment), Weight Bearing, Fall Risk, Bed Alarm (MRSA, now with L ankle wound vac)   Left Lower Extremity Weight Bearing: Non Weight Bearing                  ASSESSMENT:  Patient continues to benefit from skilled PT services and is slowly progressing towards goals. Patient's mobility is improved today, with less assistance required for transfers. Able to come to EOB with min assist and good sitting balance demonstrated EOB.  Sit to stand with max assist x 1 with additional assistance to ensure patient is NWB left LE.  Able to come to stand for 5-10 seconds with initial posterior lean.  Returned to EOB then performed    Good    After treatment:   Patient left in no apparent distress sitting up in chair, Call bell within reach, Bed/ chair alarm activated, and Caregiver / family present (RN to elevate LE's)      COMMUNICATION/EDUCATION:   The patient's plan of care was discussed with: occupational therapist and registered nurse    Patient Education  Education Given To: Patient  Education Provided: Role of Therapy;Plan of Care;Transfer Training  Education Provided Comments: transfers  Education Method: Verbal  Barriers to Learning: None  Education Outcome: Verbalized understanding;Continued education needed      Jeanna Lewis, PT  Minutes: 26

## 2024-04-11 NOTE — PLAN OF CARE
Problem: Skin/Tissue Integrity  Goal: Absence of new skin breakdown  Description: 1.  Monitor for areas of redness and/or skin breakdown  2.  Assess vascular access sites hourly  3.  Every 4-6 hours minimum:  Change oxygen saturation probe site  4.  Every 4-6 hours:  If on nasal continuous positive airway pressure, respiratory therapy assess nares and determine need for appliance change or resting period.  Outcome: Progressing     Problem: ABCDS Injury Assessment  Goal: Absence of physical injury  Outcome: Progressing     Problem: Respiratory - Adult  Goal: Achieves optimal ventilation and oxygenation  4/11/2024 1000 by Marina Wyatt RCP  Outcome: Progressing  4/11/2024 0938 by Marina Wyatt RCP  Outcome: Progressing     Problem: Physical Therapy - Adult  Goal: By Discharge: Performs mobility at highest level of function for planned discharge setting.  See evaluation for individualized goals.  Description: FUNCTIONAL STATUS PRIOR TO ADMISSION: Pt comes from [facility] at [facility] s/p having fall with L ankle fx ORIF done on 3/23/24; now to ED from rehab with fever, MRSA and now with wound vac L ankle; pt is NWB and was working on scooting, lateral transfers, and standing at rehab to this point    HOME SUPPORT PRIOR TO ADMISSION: was at [facility]    Physical Therapy Goals  Initiated 4/5/2024  1.  Patient will move from supine to sit and sit to supine, scoot up and down, and roll side to side in bed with supervision/set-up within 7 day(s).    2.  Patient will perform sit to stand with moderate assistance within 7 day(s).  3.  Patient will transfer from bed to chair and chair to bed with contact guard assist using the least restrictive device/lateral scoot within 7 day(s).      4/11/2024 1232 by Jeanna Lewis, PT  Outcome: Progressing     Problem: Occupational Therapy - Adult  Goal: By Discharge: Performs self-care activities at highest level of function for planned discharge setting.  See evaluation for individualized  goals.  Description: FUNCTIONAL STATUS PRIOR TO ADMISSION:  At true baseline, patient generally modified independent with ADLs and functional mobility using rollator. She does benefit from supervision for shower transfers. Patient participates in light home management ADLs. Note recent admission and L ankle ORIF on 3/22 following fracture; patient discharged to [facility].     Receives Help From: Family ( assists with IADLs when needed), ADL Assistance: Independent, Ambulation Assistance: Needs assistance,  , Active : No (active  at baseline, not recently)     HOME SUPPORT: Patient lived with supportive spouse.    Occupational Therapy Goals:  Initiated 4/5/2024  1.  Patient will perform adhere to NWB LLE throughout functional activity/mobility with minimal verbal cues within 7 day(s).  2.  Patient will perform upper body dressing and bathing with Supervision within 7 day(s).  3.  Patient will perform seated lower body bathing using lateral weight-shifting technique with Minimal Assist within 7 day(s).  4.  Patient will perform toilet transfers to bariatric drop-arm [facility] with Supervision / Set-up within 7 day(s).  5.  Patient will perform all aspects of seated toileting with Minimal Assist within 7 day(s).  6.  Patient will participate in upper extremity therapeutic exercise/activities with Supervision for 10 minutes within 7 day(s).    7.  Patient will utilize energy conservation techniques during functional activities with verbal cues within 7 day(s).  4/11/2024 1255 by Savannah Gambino, OT  Outcome: Progressing

## 2024-04-11 NOTE — CARE COORDINATION
Edgar Daniel ordering pt's daptomycin so they can provide it when she admits. MELVIN, Edgar rep, and [facility] care team had been working towards discharge this afternoon, however pt requires PICC line be placed for abx administration at [facility]. Admission deferred from 4/11. MELVIN to check bed availability 4/12.    Corie Espino, [facility]  Care Management  [phone]

## 2024-04-12 ENCOUNTER — APPOINTMENT (OUTPATIENT)
Facility: HOSPITAL | Age: 74
End: 2024-04-12
Attending: GENERAL ACUTE CARE HOSPITAL
Payer: MEDICARE

## 2024-04-12 ENCOUNTER — APPOINTMENT (OUTPATIENT)
Facility: HOSPITAL | Age: 74
End: 2024-04-12
Payer: MEDICARE

## 2024-04-12 VITALS
BODY MASS INDEX: 33.53 KG/M2 | TEMPERATURE: 97.7 F | WEIGHT: 226.41 LBS | RESPIRATION RATE: 16 BRPM | HEIGHT: 69 IN | SYSTOLIC BLOOD PRESSURE: 131 MMHG | OXYGEN SATURATION: 100 % | HEART RATE: 80 BPM | DIASTOLIC BLOOD PRESSURE: 75 MMHG

## 2024-04-12 LAB
GLUCOSE BLD STRIP.AUTO-MCNC: 134 MG/DL (ref 65–117)
GLUCOSE BLD STRIP.AUTO-MCNC: 139 MG/DL (ref 65–117)
GLUCOSE BLD STRIP.AUTO-MCNC: 216 MG/DL (ref 65–117)
SERVICE CMNT-IMP: ABNORMAL

## 2024-04-12 PROCEDURE — 94640 AIRWAY INHALATION TREATMENT: CPT

## 2024-04-12 PROCEDURE — 2700000000 HC OXYGEN THERAPY PER DAY

## 2024-04-12 PROCEDURE — C1751 CATH, INF, PER/CENT/MIDLINE: HCPCS

## 2024-04-12 PROCEDURE — 6370000000 HC RX 637 (ALT 250 FOR IP): Performed by: INTERNAL MEDICINE

## 2024-04-12 PROCEDURE — 94760 N-INVAS EAR/PLS OXIMETRY 1: CPT

## 2024-04-12 PROCEDURE — APPNB45 APP NON BILLABLE 31-45 MINUTES: Performed by: PHYSICIAN ASSISTANT

## 2024-04-12 PROCEDURE — 2580000003 HC RX 258: Performed by: INTERNAL MEDICINE

## 2024-04-12 PROCEDURE — 76937 US GUIDE VASCULAR ACCESS: CPT

## 2024-04-12 PROCEDURE — 36569 INSJ PICC 5 YR+ W/O IMAGING: CPT

## 2024-04-12 PROCEDURE — 02HV33Z INSERTION OF INFUSION DEVICE INTO SUPERIOR VENA CAVA, PERCUTANEOUS APPROACH: ICD-10-PCS | Performed by: INTERNAL MEDICINE

## 2024-04-12 PROCEDURE — 71045 X-RAY EXAM CHEST 1 VIEW: CPT

## 2024-04-12 PROCEDURE — 6360000002 HC RX W HCPCS: Performed by: STUDENT IN AN ORGANIZED HEALTH CARE EDUCATION/TRAINING PROGRAM

## 2024-04-12 PROCEDURE — 6360000002 HC RX W HCPCS: Performed by: INTERNAL MEDICINE

## 2024-04-12 PROCEDURE — 93922 UPR/L XTREMITY ART 2 LEVELS: CPT

## 2024-04-12 PROCEDURE — 29581 APPL MULTLAYER CMPRN SYS LEG: CPT

## 2024-04-12 PROCEDURE — 82962 GLUCOSE BLOOD TEST: CPT

## 2024-04-12 RX ORDER — TRAMADOL HYDROCHLORIDE 50 MG/1
50 TABLET ORAL EVERY 6 HOURS PRN
Qty: 4 TABLET | Refills: 0 | Status: SHIPPED
Start: 2024-04-12 | End: 2024-04-15

## 2024-04-12 RX ADMIN — ASPIRIN 81 MG: 81 TABLET, CHEWABLE ORAL at 09:24

## 2024-04-12 RX ADMIN — Medication: at 09:26

## 2024-04-12 RX ADMIN — Medication: at 13:23

## 2024-04-12 RX ADMIN — SODIUM CHLORIDE: 9 INJECTION, SOLUTION INTRAVENOUS at 13:18

## 2024-04-12 RX ADMIN — DAPTOMYCIN 650 MG: 500 INJECTION, POWDER, LYOPHILIZED, FOR SOLUTION INTRAVENOUS at 13:19

## 2024-04-12 RX ADMIN — ISOSORBIDE MONONITRATE 60 MG: 30 TABLET, EXTENDED RELEASE ORAL at 09:24

## 2024-04-12 RX ADMIN — INSULIN LISPRO 1 UNITS: 100 INJECTION, SOLUTION INTRAVENOUS; SUBCUTANEOUS at 13:21

## 2024-04-12 RX ADMIN — PREGABALIN 300 MG: 150 CAPSULE ORAL at 09:24

## 2024-04-12 RX ADMIN — Medication 400 MG: at 09:24

## 2024-04-12 RX ADMIN — MUPIROCIN: 20 OINTMENT TOPICAL at 09:26

## 2024-04-12 RX ADMIN — BUMETANIDE 2 MG: 1 TABLET ORAL at 09:24

## 2024-04-12 RX ADMIN — POTASSIUM CHLORIDE 10 MEQ: 1500 TABLET, EXTENDED RELEASE ORAL at 09:24

## 2024-04-12 RX ADMIN — PANTOPRAZOLE SODIUM 40 MG: 40 TABLET, DELAYED RELEASE ORAL at 06:09

## 2024-04-12 RX ADMIN — ARFORMOTEROL TARTRATE: 15 SOLUTION RESPIRATORY (INHALATION) at 08:26

## 2024-04-12 RX ADMIN — CARVEDILOL 25 MG: 12.5 TABLET, FILM COATED ORAL at 09:24

## 2024-04-12 RX ADMIN — ENOXAPARIN SODIUM 30 MG: 100 INJECTION SUBCUTANEOUS at 09:24

## 2024-04-12 RX ADMIN — SODIUM CHLORIDE, PRESERVATIVE FREE 10 ML: 5 INJECTION INTRAVENOUS at 09:26

## 2024-04-12 ASSESSMENT — PAIN SCALES - GENERAL
PAINLEVEL_OUTOF10: 0
PAINLEVEL_OUTOF10: 0

## 2024-04-12 NOTE — PLAN OF CARE
Problem: Skin/Tissue Integrity  Goal: Absence of new skin breakdown  Description: 1.  Monitor for areas of redness and/or skin breakdown  2.  Assess vascular access sites hourly  3.  Every 4-6 hours minimum:  Change oxygen saturation probe site  4.  Every 4-6 hours:  If on nasal continuous positive airway pressure, respiratory therapy assess nares and determine need for appliance change or resting period.  4/12/2024 1823 by Esther Sierra RN  Outcome: Adequate for Discharge  4/12/2024 1041 by Esther Sierra RN  Outcome: Progressing     Problem: Safety - Adult  Goal: Free from fall injury  4/12/2024 1823 by Esther Sierra RN  Outcome: Adequate for Discharge  4/12/2024 1041 by Esther Sierra RN  Outcome: Progressing  Flowsheets (Taken 4/12/2024 0930)  Free From Fall Injury: Instruct family/caregiver on patient safety     Problem: ABCDS Injury Assessment  Goal: Absence of physical injury  4/12/2024 1823 by Esther Sierra RN  Outcome: Adequate for Discharge  4/12/2024 1041 by Esther Sierra RN  Outcome: Progressing  Flowsheets (Taken 4/12/2024 0930)  Absence of Physical Injury: Implement safety measures based on patient assessment     Problem: Chronic Conditions and Co-morbidities  Goal: Patient's chronic conditions and co-morbidity symptoms are monitored and maintained or improved  4/12/2024 1823 by Esther Sierra RN  Outcome: Adequate for Discharge  4/12/2024 1041 by Esther Sierra RN  Outcome: Progressing     Problem: Pain  Goal: Verbalizes/displays adequate comfort level or baseline comfort level  4/12/2024 1823 by Esther Sierra RN  Outcome: Adequate for Discharge  4/12/2024 1041 by Esther Sierra RN  Outcome: Progressing     Problem: Respiratory - Adult  Goal: Achieves optimal ventilation and oxygenation  4/12/2024 1823 by Esther Sierra RN  Outcome: Adequate for Discharge  4/12/2024 1041 by Esther Sierra RN  Outcome: Progressing  4/12/2024 0828 by Waldo Gordon, RT  Outcome:

## 2024-04-12 NOTE — PROGRESS NOTES
Report called to Sabrina Back RN with [facility]. SBAR/History/Allergies reviewed. Opportunity for questions provided.

## 2024-04-12 NOTE — PROGRESS NOTES
Pain and swelling improved  Working with pt  Pice of wound reviewed.  Some drainage notes, just serous .  No signs of active infection    Visit Vitals  /69   Pulse 58   Temp 98.1 °F (36.7 °C) (Oral)   Resp 17   Ht 1.753 m (5' 9\")   Wt 102.7 kg (226 lb 6.6 oz)   SpO2 91%   BMI 33.44 kg/m²     Dressing clean and dry    Continue pt  If drainage persists may need wound I and D  Ok to dc with good wound care.  Follow up in office 5-7 days

## 2024-04-12 NOTE — PLAN OF CARE
Problem: Skin/Tissue Integrity  Goal: Absence of new skin breakdown  Description: 1.  Monitor for areas of redness and/or skin breakdown  2.  Assess vascular access sites hourly  3.  Every 4-6 hours minimum:  Change oxygen saturation probe site  4.  Every 4-6 hours:  If on nasal continuous positive airway pressure, respiratory therapy assess nares and determine need for appliance change or resting period.  4/12/2024 1041 by Esther Sierra, RN  Outcome: Progressing  4/11/2024 2210 by Kimmie Mahmood LPN  Outcome: Progressing     Problem: Safety - Adult  Goal: Free from fall injury  4/12/2024 1041 by Esther Sierra RN  Outcome: Progressing  Flowsheets (Taken 4/12/2024 0930)  Free From Fall Injury: Instruct family/caregiver on patient safety  4/11/2024 2210 by Kimmie Mahmood LPN  Outcome: Progressing     Problem: ABCDS Injury Assessment  Goal: Absence of physical injury  4/12/2024 1041 by Esther Sierra RN  Outcome: Progressing  Flowsheets (Taken 4/12/2024 0930)  Absence of Physical Injury: Implement safety measures based on patient assessment  4/11/2024 2210 by Kimmie Mahmood LPN  Outcome: Progressing     Problem: Chronic Conditions and Co-morbidities  Goal: Patient's chronic conditions and co-morbidity symptoms are monitored and maintained or improved  Outcome: Progressing     Problem: Pain  Goal: Verbalizes/displays adequate comfort level or baseline comfort level  4/12/2024 1041 by Esther Sierra RN  Outcome: Progressing  4/11/2024 2210 by Kimmie Mahmood LPN  Outcome: Progressing     Problem: Respiratory - Adult  Goal: Achieves optimal ventilation and oxygenation  4/12/2024 1041 by Esther Sierra, RN  Outcome: Progressing  4/12/2024 0828 by Waldo Gordon, RT  Outcome: Progressing  Flowsheets (Taken 4/12/2024 0828)  Achieves optimal ventilation and oxygenation:   Assess for changes in respiratory status   Position to facilitate oxygenation and minimize respiratory effort   Respiratory therapy support as

## 2024-04-12 NOTE — WOUND CARE
Wound care consult to place a therapeutic compression wrap to control the edema in the left lower leg and foot.  Pt. Is healing from a Fractured ankle but she has a lot of edema / fluid in the foot and leg that is constantly dripping from the suture site.   Appropriate tests ordered to rule out PAD.  Pt. Already has known Venous stasis and struggles with edema in the lower legs bilaterally.   Pt. Is supposed to be non-weight baring at this time.     Assessment: the dressing applied yesterday was a silver alginate and a foam dressing and there was serous drainage on the dressing today but it was half saturated.   When the dressing was removed, the site dripped the serous fluid. Sutures are still intact and clean. No redness or pus from the site. No pain at the site.   DESHAUN done today:     Lower Arterial Doppler    Bilateral ABIs are unreliable due to medial calcinosis.   Right side findings: Normal resting TBI. Right PVR waveforms: Normal - ankle and metatarsal region. Right toe PPG is normal.   Left side findings: Normal resting TBI. Left PVR waveforms: Normal - ankle and metatarsal region. Left toe PPG is normal.     Arterial Pressure Measurements     Right Left   Brachial     128 mmHg         Post Tibial 160 mmHg       175 mmHg         Dorslis Pedis 172 mmHg       182 mmHg         DESHAUN 1.34       1.42         Toe 138 mmHg       158 mmHg         TBI 1.08       1.23           Other non-invasive vascular test done to assess compressibility:  Vas Drp Lower extremity(left)    No evidence of deep vein thrombosis in the left lower extremity. No evidence of deep vein or superficial vein thrombosis in the left lower extremity. Vessels demonstrate normal compressibility, color filling, and phasic and spontaneous flow.     Indication:DVT hx     No evidence of deep vein thrombosis in the left lower extremity. No evidence of deep vein or superficial vein thrombosis in the left lower extremity. Vessels demonstrate normal

## 2024-04-12 NOTE — PLAN OF CARE
ADULT PROTOCOL: JET AEROSOL ASSESSMENT    Patient  Marilee Oakes     73 y.o.   female     4/12/2024  8:28 AM    Breath Sounds Pre Procedure: Breath Sounds Pre-Tx ADELE: Diminished                                  Breath Sounds Pre-Tx LLL: Diminished        Breath Sounds Pre-Tx RUL: Diminished        Breath Sounds Pre-Tx RML: Diminished        Breath Sounds Pre-Tx RLL: Diminished  Breath Sounds Post Procedure: Breath Sounds Post-Tx ADELE: Diminished          Breath Sounds Post-Tx LLL: Diminished          Breath Sounds Post-Tx RUL: Diminished          Breath Sounds Post-Tx RML: Diminished          Breath Sounds Post-Tx RLL: Diminished                                   Heart Rate: Pre procedure Pre-Tx Pulse: 78           Post procedure Post-Tx Pulse: 78    Resp Rate: Pre procedure Pre-Tx Resps: 16           Post procedure Post-Tx Resps: 16    SpO2:  SpO2: 99 %   with Oxygen                Nebulizer Therapy: Current medications Medications: Arformoterol, Budesonide        Changed: No    Problem List:   Patient Active Problem List   Diagnosis    Open wound of left lower leg    HBP (high blood pressure)    IBS (irritable bowel syndrome)    Foot pain, right    Ischemic cardiomyopathy    Anxiety    Ingrown toenail of left foot    Asthma    Polypharmacy    Long-term use of high-risk medication    Hypokalemia    Lethargy    Hypoglycemia    Acute exacerbation of COPD with asthma (HCC)    Obesity (BMI 30-39.9)    Hypoxia    Lower back pain    Chronic atrial fibrillation (HCC)    Hypercholesterolemia    Presence of Watchman left atrial appendage closure device    Atrial fibrillation (HCC)    Spinal stenosis, lumbar region, without neurogenic claudication    B12 deficiency    Lower abdominal pain    Type 2 diabetes mellitus with diabetic neuropathy, without long-term current use of insulin (HCC)    Right knee DJD    Recurrent UTI    Type 2 diabetes with nephropathy (HCC)    Ataxia    Lower extremity edema    Venous stasis

## 2024-04-12 NOTE — PROGRESS NOTES
Hospitalist Progress Note    NAME:   Marilee Oakes   : 1950   MRN: [id number]     Patient PCP: Monalisa Thorpe LPN    Assessment / Plan:  MRSA bacteremia  Recent left ankle ORIF  Fever  Elevated lactic acid  MRSA bacteremia on  as well    Recently Patient developed fever while at rehab and blood cultures were ordered which grew MRSA and was sent to [facility]  LAC - 2.33>0.50  MRSA screen +   Patient underwent a left ankle open reduction into fixation on 3/22 for left ankle fracture.   Xray left ankle - interval plate and screw fixation of the fibular fracture with syndesmotic screw. 2 screw fixate the medial malleolar fracture. Fibular fracture demonstrates one half shafts width posterior displacement of the distal fracture fragment. There is less than one half shafts width lateral displacement of the medial malleolar fracture.   In the emergency department, patient was noted to have small amount of pus coming from left ankle incision according to ER PA which was sent for cultures- LIGHT MRSA   Ortho consulted - advised for NWB , and dressing changes , incisional vac as per wound care  JUAN MANUEL neg  Ultrasound Doppler of left leg- negative for clots   Wound VAC on left ankle shows serosanguineous fluid, removed on   ID final recs Dapto for 6 weeks, end date 5/15, no statin while on Dapto.  Wound care RN recs DESHAUN prior to applying compression dressing therapy  Awaiting placement in IPR, stable for DC    CKD stage III  Baseline creatinine is around 1.2-1.4.   Currently creatinine is 1.30  Renally dose medications   Avoid nephrotoxic medications  F/up w Nephro for Epogen given anemia and CKD. Check iron profile      Chronic respiratory failure due to COPD on 2 L at all times  Obstructive sleep apnea on CPAP  Hypertension  Coronary artery disease  Chronic systolic congestive heart failure s/p AICD  Atrial fibrillation status post watchman's device and aspirin  Dyslipidemia  Diabetes with   Alert and oriented X 3, normal speech,   Psych:   Good insight. Not anxious nor agitated  Skin:  No rashes.  No jaundice. LLE w serousanginous drainage    Reviewed most current lab test results and cultures  YES  Reviewed most current radiology test results   YES  Review and summation of old records today    NO  Reviewed patient's current orders and MAR    YES  PMH/SH reviewed - no change compared to H&P    Procedures: see electronic medical records for all procedures/Xrays and details which were not copied into this note but were reviewed prior to creation of Plan.      LABS:  I reviewed today's most current labs and imaging studies.  Pertinent labs include:  Recent Labs     04/09/24  0758 04/10/24  0713   WBC 6.7 7.7   HGB 8.3* 8.8*   HCT 29.8* 30.7*   * 400       Recent Labs     04/09/24  0758 04/10/24  0713    138   K 4.1 4.1    103   CO2 34* 31   GLUCOSE 141* 158*   BUN 31* 33*   CREATININE 1.60* 1.48*   CALCIUM 9.4 9.6   MG 1.9 1.9   PHOS 4.2 4.5         Signed: Warren Conde MD

## 2024-04-12 NOTE — PROCEDURES
PICC Education: Explained reason and rationale for PICC placement along with providing education in order to make an informed consent including nature, risks, benefits, potential complications, care and maintenance of PICC line. The opportunity for questions or concerns was given. Patient  gave written consent for PICC procedure to be done at the bedside. Patient  verbalizes understanding at this time.      Procedure:  Time out completed.  Pre procedure assessment done.  Maximum sterile barrier precautions observed throughout procedure.  Lidocaine 1% 3.0 ml sc given prior to cannulation  Cannulated Basilic vein using ultrasound guidance and modified seldinger technique.  Inserted SINGLE lumen 4 fr PICC in  RIGHT arm using, Care[employer] Health Tip Location System and 3CG   Patient has Paced/ irregular rhythm. Chest xray ordered.       Blood return verified and flushed with 20ml NS in each port.  Sterile CHG impregnated dressing applied with Stat loc per protocol.  Curios caps applied to each port.  Reason for access (Long term abx 6 weeks daptomycin).  Complications related to insertion (None).    Patient tolerated procedure well with minimal blood loss.   PICC procedure performed by: Elida Duarte RN, BSN, [facility]/ Vascular Access Nurse  Assisted by: Caio ChildsRN, BSN, [facility] / Vascular Access Nurse    RIGHT  arm circumference: 34 cm.   Catheter internal length:  36 cm  Catheter external length: 0 cm  TOTAL Length:36 cm  PICC catheter occupies 18% of vein    Brand of catheter:  BARD  Lot #[id number]   REF# [id number]    EXP 04/31/2025.    Primary nurse notified PICC line may be NOT be used until chest xray completed and to hang new infusion tubing prior to use.        Xray completed at 1305.     1608--Xray read by Dr. Roosevelt Troy MD who stated PICC okay to use.    Elida Duarte RN, BSN, [facility]  Vascular Access Team

## 2024-04-12 NOTE — CARE COORDINATION
[facility] room to be assigned on arrival  Call report to [phone]  [address]  Accepting MD: Torres Morejon      Transition of Care Plan:    RUR: 27% high risk  Prior Level of Functioning: some assistance needed with IADLs  Disposition: [facility] IPR  If SNF or IPR: Date FOC offered: 4/4/24  Date FOC received: 4/4/24  Accepting facility: [facility]  Date authorization started with reference number: n/a  Date authorization received and expires:   Follow up appointments: defer to facility  DME needed: defer to facility  Transportation at discharge: BLS   IM/IMM Medicare/ letter given: second letter to be given today  Is patient a  and connected with VA? no   If yes, was  transfer form completed and VA notified?   Caregiver Contact:  Wesley  Discharge Caregiver contacted prior to discharge? Yes, at bedside  Care Conference needed? no  Barriers to discharge: PICC placement, DESHAUN test    1015 - [facility] liaison has confirmed bed availability today; pt will need to receive today's dose of daptomycin while at [facility]. MELVIN spoke with pt's  Wesley briefly outside room, informed Wesley that [facility] is ready to accept today as long as all medical criteria are met, Wesley verbalized understanding and agreement. MELVIN spoke with care team re discharge timing; transportation requested for late afternoon to ensure sufficient time for DESHAUN test and PICC placement.      Corie Espino, [facility]  Care Management  [phone]

## 2024-04-12 NOTE — DISCHARGE SUMMARY
Discharge Summary    Name: Marilee Oakes  [id number]  YOB: 1950 (Age: 73 y.o.)   Date of Admission: 4/3/2024  Date of Discharge: 4/12/2024  Attending Physician: Ricky Troy MD    Discharge Diagnosis:   MRSA bacteremia  Bimalleolar left ankle fracture  S/p ORIF L/ankle fracture 3/23  CKD stage III  Chronic respiratory failure due to COPD on 2 L at all times  Obstructive sleep apnea on CPAP  Hypertension  Coronary artery disease  Chronic systolic congestive heart failure s/p AICD  Atrial fibrillation status post watchman's device and aspirin  Dyslipidemia  Diabetes with neuropathy  Mild chronic anemia        Consultations:  IP CONSULT TO ORTHOPEDIC SURGERY  IP CONSULT TO PHARMACY  IP CONSULT TO HOSPITALIST  IP CONSULT TO PHARMACY  IP WOUND CARE NURSE CONSULT TO EVAL  IP CONSULT TO INFECTIOUS DISEASES  IP CONSULT TO CARDIOLOGY  IP CONSULT TO CASE MANAGEMENT  IP WOUND CARE NURSE CONSULT TO EVAL      Brief Admission History/Reason for Admission Per Chris Malik MD:   Marilee Oakes is a 73 y.o.  female with PMHx significant for recent left ankle open reduction and internal fixation, COPD, hypertension, coronary artery disease, congestive heart failure, atrial fibrillation is coming the hospital chief complaints of positive blood cultures which resulted MRSA at rehab and also had some postoperative fever.  Patient was discharged from the hospital on 3/26 after undergoing a left ankle open reduction to fixation to rehab and while at rehab she developed fever for which blood cultures were checked and they grew MRSA and patient was sent to [facility] for further evaluation.  Patient also reported some increasing pain at the left ankle along with some drainage when dressing was removed.  Does not report any chest pain or shortness of breath.     On arrival to ED, noted to have stable vitals.  On labs noted to have creatinine 1.4, glucose is 111, lactic acid is 2.3.  ALT is  chewable tablet  Take 1 tablet by mouth daily     balsum peru-castor oil Oint ointment  Apply topically 2 times daily     budesonide-formoterol 160-4.5 MCG/ACT Aero  Commonly known as: Symbicort  Inhale 2 puffs into the lungs 2 times daily     bumetanide 2 MG tablet  Commonly known as: BUMEX  TAKE 1 TABLET EVERY DAY     carvedilol 25 MG tablet  Commonly known as: COREG  TAKE 1 TABLET TWICE DAILY WITH MEALS     clonazePAM 0.5 MG tablet  Commonly known as: KLONOPIN  Take 1 tablet by mouth nightly for 15 days. Max Daily Amount: 0.5 mg     clotrimazole-betamethasone 1-0.05 % cream  Commonly known as: LOTRISONE     isosorbide mononitrate 60 MG extended release tablet  Commonly known as: IMDUR  TAKE 1 TABLET EVERY DAY     magnesium oxide 400 MG tablet  Commonly known as: MAG-OX     naloxone 4 MG/0.1ML Liqd nasal spray  Commonly known as: Narcan  1 spray by Nasal route as needed for Opioid Reversal     nystatin 291071 UNIT/GM cream  Commonly known as: MYCOSTATIN  Apply topically 2 times daily.     omeprazole 40 MG delayed release capsule  Commonly known as: PRILOSEC  TAKE 1 CAPSULE EVERY DAY     polyethylene glycol 17 g packet  Commonly known as: GLYCOLAX  Take 1 packet by mouth daily as needed for Constipation     potassium chloride 10 MEQ extended release tablet  Commonly known as: KLOR-CON M  TAKE 1 TABLET FOUR TIMES DAILY     pregabalin 300 MG capsule  Commonly known as: LYRICA     sodium chloride 0.65 % nasal spray  Commonly known as: OCEAN     triamcinolone 0.1 % cream  Commonly known as: KENALOG  APPLY A THIN FILM OF CREAM EXTERNALLY TO THE AFFECTED AREA TWICE DAILY     vitamin D 25 MCG (1000 UT) Caps            STOP taking these medications      venlafaxine 150 MG extended release capsule  Commonly known as: EFFEXOR XR               Where to Get Your Medications        Information about where to get these medications is not yet available    Ask your nurse or doctor about these medications  traMADol 50 MG tablet

## 2024-04-12 NOTE — PLAN OF CARE
Problem: Skin/Tissue Integrity  Goal: Absence of new skin breakdown  Description: 1.  Monitor for areas of redness and/or skin breakdown  2.  Assess vascular access sites hourly  3.  Every 4-6 hours minimum:  Change oxygen saturation probe site  4.  Every 4-6 hours:  If on nasal continuous positive airway pressure, respiratory therapy assess nares and determine need for appliance change or resting period.  4/11/2024 2210 by Kimmie Mahmood LPN  Outcome: Progressing  4/11/2024 1916 by Joel Jacobsen RN  Outcome: Progressing     Problem: Safety - Adult  Goal: Free from fall injury  Outcome: Progressing     Problem: ABCDS Injury Assessment  Goal: Absence of physical injury  4/11/2024 2210 by Kimmie Mahmood LPN  Outcome: Progressing  4/11/2024 1916 by Joel Jacobsen RN  Outcome: Progressing     Problem: Pain  Goal: Verbalizes/displays adequate comfort level or baseline comfort level  Outcome: Progressing     Problem: Respiratory - Adult  Goal: Achieves optimal ventilation and oxygenation  4/11/2024 1000 by Marina Wyatt RCP  Outcome: Progressing  4/11/2024 0938 by Marina Wyatt RCP  Outcome: Progressing     Problem: Physical Therapy - Adult  Goal: By Discharge: Performs mobility at highest level of function for planned discharge setting.  See evaluation for individualized goals.  Description: FUNCTIONAL STATUS PRIOR TO ADMISSION: Pt comes from [facility] at [facility] s/p having fall with L ankle fx ORIF done on 3/23/24; now to ED from rehab with fever, MRSA and now with wound vac L ankle; pt is NWB and was working on scooting, lateral transfers, and standing at rehab to this point    HOME SUPPORT PRIOR TO ADMISSION: was at [facility]    Physical Therapy Goals  Initiated 4/5/2024  1.  Patient will move from supine to sit and sit to supine, scoot up and down, and roll side to side in bed with supervision/set-up within 7 day(s).    2.  Patient will perform sit to stand with moderate assistance within 7 day(s).  3.  Patient will

## 2024-04-13 LAB
ECHO BSA: 2.24 M2
VAS LEFT ABI: 1.42
VAS LEFT ARM BP: 128 MMHG
VAS LEFT DORSALIS PEDIS BP: 182 MMHG
VAS LEFT PTA BP: 175 MMHG
VAS LEFT TBI: 1.23
VAS LEFT TOE PRESSURE: 158 MMHG
VAS RIGHT ABI: 1.34
VAS RIGHT DORSALIS PEDIS BP: 172 MMHG
VAS RIGHT PTA BP: 160 MMHG
VAS RIGHT TBI: 1.08
VAS RIGHT TOE PRESSURE: 138 MMHG

## 2024-04-17 ENCOUNTER — TELEPHONE (OUTPATIENT)
Age: 74
End: 2024-04-17

## 2024-04-17 NOTE — TELEPHONE ENCOUNTER
Dr Carroll with [facility]     Due to kidney function - they changed daptomycin dosage to every 48 hours   They will fax labs tomorrow     Best number to reach her is C [phone]

## 2024-04-19 ENCOUNTER — TELEPHONE (OUTPATIENT)
Age: 74
End: 2024-04-19

## 2024-04-19 NOTE — TELEPHONE ENCOUNTER
Per  Note from 4/19/2024    ID  Message received today 4/19 that physician from [facility] had called regarding changing  Daptomycin to every 48hours.  Dr.Emily Hummel contacted at [phone] as soon as message was received.  Dr. Hummel confirmed that patient had been changed to Daptomycin every 48 hours due to rise in Cr.Dr Hummel stated that Patient also has a rash.?  Vasculitic.  Rash is progressing to cover upper and lower extremities.  Advised to hold Daptomycin, even though it is thought to be vasculitic.  Patient to receive Daptomycin today.  This dose is to be held. Physician/ on call physician at [facility] to contact me with update on rash on Sunday .  Advised to send patient to ED if rash is getting worse.Dr Hummel did not believe pt required ED evaluation as she is clinically getting better & she stated she was not going to send patient to ED.

## 2024-04-19 NOTE — TELEPHONE ENCOUNTER
ID  Message received today 4/19 that physician from [facility] had called regarding changing  Daptomycin to every 48hours.  Dr.Emily Hummel contacted at [phone] as soon as message was received.  Dr. Hummel confirmed that patient had been changed to Daptomycin every 48 hours due to rise in Cr.Dr Hummel stated that Patient also has a rash.?  Vasculitic.  Rash is progressing to cover upper and lower extremities.  Advised to hold Daptomycin, even though it is thought to be vasculitic.  Patient to receive Daptomycin today.  This dose is to be held. Physician/ on call physician at [facility] to contact me with update on rash on Sunday .  Advised to send patient to ED if rash is getting worse.Dr Hummel did not believe pt required ED evaluation as she is clinically getting better & she stated she was not going to send patient to ED.

## 2024-04-19 NOTE — TELEPHONE ENCOUNTER
Correction Dr Anusha Hummel     C  [phone]    Ref:   (Oldest Message First)  April 17, 2024  Me     TE    4/17/24  3:31 PM  Note     Dr Carroll with [facility]      Due to kidney function - they changed daptomycin dosage to every 48 hours   They will fax labs tomorrow      Best number to reach her is C [phone]             TE    4/17/24  3:31 PM  You routed this conversation to [facility]  April 18, 2024  Me     TE    4/18/24  4:15 PM  Note      calling again - Pls give her a call back              TE    4/18/24  4:15 PM  You routed this conversation to [facility]

## 2024-04-22 ENCOUNTER — TELEPHONE (OUTPATIENT)
Age: 74
End: 2024-04-22

## 2024-04-22 NOTE — TELEPHONE ENCOUNTER
Called and left message with nurse to to check to see if Vancomycin has been started and daptomycin has stopped.

## 2024-04-22 NOTE — TELEPHONE ENCOUNTER
Called and spoke with nurse desir.     Patient is to start Vancomycin Today 18:00pm    Daptomycin has stopped

## 2024-04-22 NOTE — TELEPHONE ENCOUNTER
ID  Case discussed with Dr. Morejon yesterday/21. Per Dr Morejon rash is better.  Advised to stop Daptomycin  Facility pharmacist to start vancomycin & dose with care.  Monitor creatinine closely. Dr Morejon was agreeable with plan of care.      Please call facility and make sure they are dosing vancomycin  Daptomycin to be stopped  I would like to see patient- please add her to schedule on 4/25 at 4 pm. If they cannot  Bring her in, we could do a virtual appointment.  Thank you

## 2024-04-24 ENCOUNTER — TELEPHONE (OUTPATIENT)
Age: 74
End: 2024-04-24

## 2024-04-24 NOTE — TELEPHONE ENCOUNTER
Called and spoke with Nurse Octavio Ramos.     Patient renal function is worsening    Rash has been improving     STAT labs sent out today    Per Nurse: Pt is experiencing cognitive impairment    I will try to marcie an VV for pt on 4/30 due to cognitive impairment at the moment       [facility] [phone]

## 2024-04-24 NOTE — TELEPHONE ENCOUNTER
Please call facility and ask how patient is doing?  How is the rash?  Renal function?  I  patient labs.  Please place on my desk when you receive them.  I would like to see her tomorrow at 1230 or  4/30 at 330-in person or virtual also okay.

## 2024-04-25 ENCOUNTER — APPOINTMENT (OUTPATIENT)
Facility: HOSPITAL | Age: 74
End: 2024-04-25
Payer: MEDICARE

## 2024-04-25 ENCOUNTER — HOSPITAL ENCOUNTER (EMERGENCY)
Facility: HOSPITAL | Age: 74
Discharge: HOME | End: 2024-04-28
Payer: MEDICARE

## 2024-04-25 ENCOUNTER — HOSPITAL ENCOUNTER (INPATIENT)
Facility: HOSPITAL | Age: 74
LOS: 15 days | Discharge: HOME | End: 2024-05-10
Attending: EMERGENCY MEDICINE | Admitting: INTERNAL MEDICINE
Payer: MEDICARE

## 2024-04-25 ENCOUNTER — TELEPHONE (OUTPATIENT)
Age: 74
End: 2024-04-25

## 2024-04-25 DIAGNOSIS — N17.9 AKI (ACUTE KIDNEY INJURY) (HCC): Primary | ICD-10-CM

## 2024-04-25 DIAGNOSIS — E11.42 DIABETIC POLYNEUROPATHY ASSOCIATED WITH TYPE 2 DIABETES MELLITUS (HCC): ICD-10-CM

## 2024-04-25 DIAGNOSIS — D64.9 ANEMIA, UNSPECIFIED TYPE: ICD-10-CM

## 2024-04-25 LAB
ALBUMIN SERPL-MCNC: 2.7 G/DL (ref 3.5–5)
ALBUMIN/GLOB SERPL: 0.7 (ref 1.1–2.2)
ALP SERPL-CCNC: 153 U/L (ref 45–117)
ALT SERPL-CCNC: 15 U/L (ref 12–78)
ANION GAP SERPL CALC-SCNC: 6 MMOL/L (ref 5–15)
APPEARANCE UR: ABNORMAL
AST SERPL-CCNC: 12 U/L (ref 15–37)
BACTERIA URNS QL MICRO: ABNORMAL /HPF
BASOPHILS # BLD: 0 K/UL (ref 0–0.1)
BASOPHILS NFR BLD: 1 % (ref 0–1)
BILIRUB SERPL-MCNC: 0.6 MG/DL (ref 0.2–1)
BILIRUB UR QL CFM: NEGATIVE
BUN SERPL-MCNC: 47 MG/DL (ref 6–20)
BUN/CREAT SERPL: 18 (ref 12–20)
CALCIUM SERPL-MCNC: 8.8 MG/DL (ref 8.5–10.1)
CHLORIDE SERPL-SCNC: 110 MMOL/L (ref 97–108)
CO2 SERPL-SCNC: 29 MMOL/L (ref 21–32)
COLOR UR: ABNORMAL
CREAT SERPL-MCNC: 2.59 MG/DL (ref 0.55–1.02)
DIFFERENTIAL METHOD BLD: ABNORMAL
EOSINOPHIL # BLD: 0.2 K/UL (ref 0–0.4)
EOSINOPHIL NFR BLD: 6 % (ref 0–7)
EPITH CASTS URNS QL MICRO: ABNORMAL /LPF
ERYTHROCYTE [DISTWIDTH] IN BLOOD BY AUTOMATED COUNT: 18.7 % (ref 11.5–14.5)
GLOBULIN SER CALC-MCNC: 3.7 G/DL (ref 2–4)
GLUCOSE BLD STRIP.AUTO-MCNC: 131 MG/DL (ref 65–117)
GLUCOSE BLD STRIP.AUTO-MCNC: 139 MG/DL (ref 65–117)
GLUCOSE BLD STRIP.AUTO-MCNC: 155 MG/DL (ref 65–117)
GLUCOSE SERPL-MCNC: 115 MG/DL (ref 65–100)
GLUCOSE UR STRIP.AUTO-MCNC: NEGATIVE MG/DL
HCT VFR BLD AUTO: 26.5 % (ref 35–47)
HEMOCCULT STL QL: NEGATIVE
HGB BLD-MCNC: 7.5 G/DL (ref 11.5–16)
HGB UR QL STRIP: ABNORMAL
IMM GRANULOCYTES # BLD AUTO: 0 K/UL (ref 0–0.04)
IMM GRANULOCYTES NFR BLD AUTO: 1 % (ref 0–0.5)
KETONES UR QL STRIP.AUTO: NEGATIVE MG/DL
LACTATE BLD-SCNC: 0.64 MMOL/L (ref 0.4–2)
LEUKOCYTE ESTERASE UR QL STRIP.AUTO: ABNORMAL
LYMPHOCYTES # BLD: 0.6 K/UL (ref 0.8–3.5)
LYMPHOCYTES NFR BLD: 15 % (ref 12–49)
MAGNESIUM SERPL-MCNC: 2.2 MG/DL (ref 1.6–2.4)
MCH RBC QN AUTO: 24.7 PG (ref 26–34)
MCHC RBC AUTO-ENTMCNC: 28.3 G/DL (ref 30–36.5)
MCV RBC AUTO: 87.2 FL (ref 80–99)
MONOCYTES # BLD: 0.3 K/UL (ref 0–1)
MONOCYTES NFR BLD: 7 % (ref 5–13)
NEUTS SEG # BLD: 3 K/UL (ref 1.8–8)
NEUTS SEG NFR BLD: 70 % (ref 32–75)
NITRITE UR QL STRIP.AUTO: NEGATIVE
NRBC # BLD: 0 K/UL (ref 0–0.01)
NRBC BLD-RTO: 0 PER 100 WBC
PH UR STRIP: 6 (ref 5–8)
PLATELET # BLD AUTO: 187 K/UL (ref 150–400)
PMV BLD AUTO: 10.9 FL (ref 8.9–12.9)
POTASSIUM SERPL-SCNC: 3.2 MMOL/L (ref 3.5–5.1)
PROCALCITONIN SERPL-MCNC: <0.05 NG/ML
PROT SERPL-MCNC: 6.4 G/DL (ref 6.4–8.2)
PROT UR STRIP-MCNC: 30 MG/DL
RBC # BLD AUTO: 3.04 M/UL (ref 3.8–5.2)
RBC #/AREA URNS HPF: >100 /HPF (ref 0–5)
RBC MORPH BLD: ABNORMAL
SERVICE CMNT-IMP: ABNORMAL
SODIUM SERPL-SCNC: 145 MMOL/L (ref 136–145)
SP GR UR REFRACTOMETRY: 1.01
TROPONIN I SERPL HS-MCNC: 28 NG/L (ref 0–51)
URINE CULTURE IF INDICATED: ABNORMAL
UROBILINOGEN UR QL STRIP.AUTO: 1 EU/DL (ref 0.2–1)
WBC # BLD AUTO: 4.1 K/UL (ref 3.6–11)
WBC URNS QL MICRO: ABNORMAL /HPF (ref 0–4)
YEAST URNS QL MICRO: PRESENT

## 2024-04-25 PROCEDURE — 84145 PROCALCITONIN (PCT): CPT

## 2024-04-25 PROCEDURE — 85025 COMPLETE CBC W/AUTO DIFF WBC: CPT

## 2024-04-25 PROCEDURE — 83605 ASSAY OF LACTIC ACID: CPT

## 2024-04-25 PROCEDURE — 84484 ASSAY OF TROPONIN QUANT: CPT

## 2024-04-25 PROCEDURE — 83735 ASSAY OF MAGNESIUM: CPT

## 2024-04-25 PROCEDURE — 99285 EMERGENCY DEPT VISIT HI MDM: CPT

## 2024-04-25 PROCEDURE — 71045 X-RAY EXAM CHEST 1 VIEW: CPT

## 2024-04-25 PROCEDURE — 6360000002 HC RX W HCPCS: Performed by: INTERNAL MEDICINE

## 2024-04-25 PROCEDURE — 6370000000 HC RX 637 (ALT 250 FOR IP): Performed by: INTERNAL MEDICINE

## 2024-04-25 PROCEDURE — 2580000003 HC RX 258: Performed by: EMERGENCY MEDICINE

## 2024-04-25 PROCEDURE — 96360 HYDRATION IV INFUSION INIT: CPT

## 2024-04-25 PROCEDURE — 76770 US EXAM ABDO BACK WALL COMP: CPT

## 2024-04-25 PROCEDURE — 82272 OCCULT BLD FECES 1-3 TESTS: CPT

## 2024-04-25 PROCEDURE — 1100000003 HC PRIVATE W/ TELEMETRY

## 2024-04-25 PROCEDURE — 70450 CT HEAD/BRAIN W/O DYE: CPT

## 2024-04-25 PROCEDURE — 2580000003 HC RX 258: Performed by: INTERNAL MEDICINE

## 2024-04-25 PROCEDURE — 82962 GLUCOSE BLOOD TEST: CPT

## 2024-04-25 PROCEDURE — 36415 COLL VENOUS BLD VENIPUNCTURE: CPT

## 2024-04-25 PROCEDURE — 81001 URINALYSIS AUTO W/SCOPE: CPT

## 2024-04-25 PROCEDURE — 80053 COMPREHEN METABOLIC PANEL: CPT

## 2024-04-25 PROCEDURE — 87040 BLOOD CULTURE FOR BACTERIA: CPT

## 2024-04-25 PROCEDURE — 93005 ELECTROCARDIOGRAM TRACING: CPT | Performed by: EMERGENCY MEDICINE

## 2024-04-25 RX ORDER — 0.9 % SODIUM CHLORIDE 0.9 %
1000 INTRAVENOUS SOLUTION INTRAVENOUS ONCE
Status: COMPLETED | OUTPATIENT
Start: 2024-04-25 | End: 2024-04-25

## 2024-04-25 RX ORDER — ENOXAPARIN SODIUM 100 MG/ML
30 INJECTION SUBCUTANEOUS DAILY
Status: DISCONTINUED | OUTPATIENT
Start: 2024-04-26 | End: 2024-05-03

## 2024-04-25 RX ORDER — SODIUM CHLORIDE 9 MG/ML
INJECTION, SOLUTION INTRAVENOUS CONTINUOUS
Status: ACTIVE | OUTPATIENT
Start: 2024-04-25 | End: 2024-04-27

## 2024-04-25 RX ORDER — PANTOPRAZOLE SODIUM 40 MG/1
40 TABLET, DELAYED RELEASE ORAL
Status: DISCONTINUED | OUTPATIENT
Start: 2024-04-26 | End: 2024-05-10 | Stop reason: HOSPADM

## 2024-04-25 RX ORDER — LANOLIN ALCOHOL/MO/W.PET/CERES
3 CREAM (GRAM) TOPICAL NIGHTLY
Status: DISCONTINUED | OUTPATIENT
Start: 2024-04-25 | End: 2024-05-10 | Stop reason: HOSPADM

## 2024-04-25 RX ORDER — ACETAMINOPHEN 650 MG/1
650 SUPPOSITORY RECTAL EVERY 6 HOURS PRN
Status: DISCONTINUED | OUTPATIENT
Start: 2024-04-25 | End: 2024-05-10 | Stop reason: HOSPADM

## 2024-04-25 RX ORDER — ONDANSETRON 4 MG/1
4 TABLET, ORALLY DISINTEGRATING ORAL EVERY 8 HOURS PRN
Status: DISCONTINUED | OUTPATIENT
Start: 2024-04-25 | End: 2024-05-10 | Stop reason: HOSPADM

## 2024-04-25 RX ORDER — DEXTROSE MONOHYDRATE 100 MG/ML
INJECTION, SOLUTION INTRAVENOUS CONTINUOUS PRN
Status: DISCONTINUED | OUTPATIENT
Start: 2024-04-25 | End: 2024-04-28 | Stop reason: SDUPTHER

## 2024-04-25 RX ORDER — ONDANSETRON 2 MG/ML
4 INJECTION INTRAMUSCULAR; INTRAVENOUS EVERY 6 HOURS PRN
Status: DISCONTINUED | OUTPATIENT
Start: 2024-04-25 | End: 2024-05-10 | Stop reason: HOSPADM

## 2024-04-25 RX ORDER — PREGABALIN 100 MG/1
300 CAPSULE ORAL DAILY
Status: DISCONTINUED | OUTPATIENT
Start: 2024-04-25 | End: 2024-04-26

## 2024-04-25 RX ORDER — ISOSORBIDE MONONITRATE 30 MG/1
60 TABLET, EXTENDED RELEASE ORAL DAILY
Status: DISCONTINUED | OUTPATIENT
Start: 2024-04-25 | End: 2024-05-10 | Stop reason: HOSPADM

## 2024-04-25 RX ORDER — CLONAZEPAM 0.5 MG/1
0.5 TABLET ORAL NIGHTLY
Status: DISCONTINUED | OUTPATIENT
Start: 2024-04-25 | End: 2024-05-10 | Stop reason: HOSPADM

## 2024-04-25 RX ORDER — ACETAMINOPHEN 325 MG/1
650 TABLET ORAL EVERY 6 HOURS PRN
Status: DISCONTINUED | OUTPATIENT
Start: 2024-04-25 | End: 2024-05-10 | Stop reason: HOSPADM

## 2024-04-25 RX ORDER — POLYETHYLENE GLYCOL 3350 17 G/17G
17 POWDER, FOR SOLUTION ORAL DAILY PRN
Status: DISCONTINUED | OUTPATIENT
Start: 2024-04-25 | End: 2024-05-10 | Stop reason: HOSPADM

## 2024-04-25 RX ORDER — INSULIN LISPRO 100 [IU]/ML
0-4 INJECTION, SOLUTION INTRAVENOUS; SUBCUTANEOUS NIGHTLY
Status: DISCONTINUED | OUTPATIENT
Start: 2024-04-25 | End: 2024-04-28

## 2024-04-25 RX ORDER — GLUCAGON 1 MG/ML
1 KIT INJECTION PRN
Status: DISCONTINUED | OUTPATIENT
Start: 2024-04-25 | End: 2024-04-28 | Stop reason: SDUPTHER

## 2024-04-25 RX ORDER — SODIUM CHLORIDE 0.9 % (FLUSH) 0.9 %
5-40 SYRINGE (ML) INJECTION PRN
Status: DISCONTINUED | OUTPATIENT
Start: 2024-04-25 | End: 2024-05-10 | Stop reason: HOSPADM

## 2024-04-25 RX ORDER — ALBUTEROL SULFATE 90 UG/1
1 AEROSOL, METERED RESPIRATORY (INHALATION) EVERY 4 HOURS PRN
Status: DISCONTINUED | OUTPATIENT
Start: 2024-04-25 | End: 2024-04-27

## 2024-04-25 RX ORDER — ASPIRIN 81 MG/1
81 TABLET, CHEWABLE ORAL DAILY
Status: DISCONTINUED | OUTPATIENT
Start: 2024-04-25 | End: 2024-05-10 | Stop reason: HOSPADM

## 2024-04-25 RX ORDER — MAGNESIUM HYDROXIDE/ALUMINUM HYDROXICE/SIMETHICONE 120; 1200; 1200 MG/30ML; MG/30ML; MG/30ML
30 SUSPENSION ORAL EVERY 6 HOURS PRN
Status: DISCONTINUED | OUTPATIENT
Start: 2024-04-25 | End: 2024-05-10 | Stop reason: HOSPADM

## 2024-04-25 RX ORDER — SODIUM CHLORIDE 9 MG/ML
INJECTION, SOLUTION INTRAVENOUS PRN
Status: DISCONTINUED | OUTPATIENT
Start: 2024-04-25 | End: 2024-05-10 | Stop reason: HOSPADM

## 2024-04-25 RX ORDER — POTASSIUM CHLORIDE 20 MEQ/1
40 TABLET, EXTENDED RELEASE ORAL ONCE
Status: COMPLETED | OUTPATIENT
Start: 2024-04-25 | End: 2024-04-25

## 2024-04-25 RX ORDER — INSULIN LISPRO 100 [IU]/ML
0-4 INJECTION, SOLUTION INTRAVENOUS; SUBCUTANEOUS
Status: DISCONTINUED | OUTPATIENT
Start: 2024-04-25 | End: 2024-04-28

## 2024-04-25 RX ORDER — SODIUM CHLORIDE 0.9 % (FLUSH) 0.9 %
5-40 SYRINGE (ML) INJECTION EVERY 12 HOURS SCHEDULED
Status: DISCONTINUED | OUTPATIENT
Start: 2024-04-25 | End: 2024-05-10 | Stop reason: HOSPADM

## 2024-04-25 RX ORDER — CARVEDILOL 12.5 MG/1
25 TABLET ORAL 2 TIMES DAILY WITH MEALS
Status: DISCONTINUED | OUTPATIENT
Start: 2024-04-25 | End: 2024-05-10 | Stop reason: HOSPADM

## 2024-04-25 RX ADMIN — ISOSORBIDE MONONITRATE 60 MG: 30 TABLET, EXTENDED RELEASE ORAL at 16:25

## 2024-04-25 RX ADMIN — SODIUM CHLORIDE, PRESERVATIVE FREE 10 ML: 5 INJECTION INTRAVENOUS at 21:36

## 2024-04-25 RX ADMIN — POTASSIUM CHLORIDE 40 MEQ: 1500 TABLET, EXTENDED RELEASE ORAL at 16:25

## 2024-04-25 RX ADMIN — SODIUM CHLORIDE: 9 INJECTION, SOLUTION INTRAVENOUS at 16:25

## 2024-04-25 RX ADMIN — PREGABALIN 300 MG: 150 CAPSULE ORAL at 16:25

## 2024-04-25 RX ADMIN — CARVEDILOL 25 MG: 12.5 TABLET, FILM COATED ORAL at 16:25

## 2024-04-25 RX ADMIN — SODIUM CHLORIDE 1000 ML: 9 INJECTION, SOLUTION INTRAVENOUS at 13:23

## 2024-04-25 ASSESSMENT — PAIN SCALES - GENERAL: PAINLEVEL_OUTOF10: 0

## 2024-04-25 NOTE — TELEPHONE ENCOUNTER
Worsening cognitive function and renal function.  Patient needs to be sent to ED.  Dr Lambert messaged me -I think that was the plan  Please let nurse know.  Thanks

## 2024-04-25 NOTE — H&P
Hospitalist Admission Note    NAME:   Marilee Oakes   : 1950   MRN: [id number]     Date/Time: 2024 2:36 PM    Patient PCP: No primary care provider on file.    ______________________________________________________________________  Given the patient's current clinical presentation, I have a high level of concern for decompensation if discharged from the emergency department.  Complex decision making was performed, which includes reviewing the patient's available past medical records, laboratory results, and x-ray films.       My assessment of this patient's clinical condition and my plan of care is as follows.    Assessment / Plan:  Acute on chronic renal failure  CKD 3 with baseline creatinine between 1.2-1.6.  Monitor patient telemetry.  Continue gentle hydration with normal saline at 100 cc/h.  Hold Bumex.  Will consult nephrology for further input.    Toxic metabolic encephalopathy  Patient has been lethargic.  CT head is negative.  Continue to monitor in telemetry with neurochecks every 4 hours.  Treat underlying cause.  If mentation does not improve, consider neurology consult.    MRSA wound infection  Recurrent UTI  Left ankle fracture s/p ORIF  Apparently patient was treated for UTI and MRSA wound infection recently.  Left leg bandaged.  Patient is afebrile, white count is normal, procalcitonin is negative.  Will hold off on any antibiotics for now.  Follow-up with wound culture, CBC, lactic acid.    Anemia  Baseline hemoglobin between 8-9.  Currently hemoglobin is 7.5.  Occult blood was negative.  Monitor CBC.  Transfuse if hemoglobin is less than 7.0.    CAD  CHF with ICD  A-fib with Watchman device  Continue with aspirin, Imdur, Coreg.    Diabetes mellitus  Neuropathy  Fingerstick glucose and sliding scale insulin.  Check for A1c  Continue with Lyrica.    Anxiety  Continue with Klonopin.    GERD  Continue with Protonix.        Medical Decision Making:   I personally reviewed labs: CBC,

## 2024-04-25 NOTE — CONSULTS
04/25/24        I have been asked to see this patient by Christa Ordonez MD  for advice/opinion re: -----A KI on CKD                                                        Assessment:         HILARY on CKD stage IIIb  Acute kidney injury grade 2 KDIGO classification  Urinary incontinence  ?  Urosepsis  Chronic anemia Discussion:     Known to have baseline CKD stage IIIb A1  Monitor lymphedema and type 2 diabetes without complications  Follows with Dr. Evangelista-baseline creatinine 1.3-1.7    Plan:   Agree with use of IV fluids  Urine culture and treatment for urosepsis  Renal ultrasound  Workup for HILARY                 Thanks for consulting me. Renal service will follow patient with you.Please don't hesitate to contact me if any questions arise of if I can assist in any manner.      Kiki Eason MD  Cell no- [phone]  Available on perfect serve.          Signed By: Kiki Eason MD     April 25, 2024           Consult Date: 4/25/2024    Inpatient consult to Nephrology  Consult performed by: Kiki Eason MD  Consult ordered by: Christa Ordonez MD            Subjective   HISTORY OF PRESENTING ILLNESS :  Marilee Oakes is a 73 y.o.,female ,White (non-) with history of CKD stage IIIb, A1, hypertension, congestive heart failure with reduced vision fraction, lymphedema, LEELA on CPAP who has been at [facility] for [facility] after ORIF ankle fracture.  Sent to emergency room because of abnormal labs and worsening kidney function.  ?  Receiving vancomycin  Follows with Dr. Evangelista in clinic  Poor historian  States that she has been bedridden for some time and has been lately incontinent of urine with dysuria    PMH:  Past Medical History:   Diagnosis Date    Age-related osteoporosis without current pathological fracture     Anxiety and depression 01/22/2018     as needed for Opioid Reversal 1 each 1    polyethylene glycol (GLYCOLAX) 17 g packet Take 1 packet by mouth daily as needed for Constipation 527 g 0    budesonide-formoterol (SYMBICORT) 160-4.5 MCG/ACT AERO Inhale 2 puffs into the lungs 2 times daily 30.6 g 1    clonazePAM (KLONOPIN) 0.5 MG tablet Take 1 tablet by mouth nightly for 15 days. Max Daily Amount: 0.5 mg 15 tablet 0    aspirin 81 MG chewable tablet Take 1 tablet by mouth daily 30 tablet 3    balsum peru-castor oil (VENELEX) OINT ointment Apply topically 2 times daily 28.35 g 0    bumetanide (BUMEX) 2 MG tablet TAKE 1 TABLET EVERY DAY 90 tablet 3    omeprazole (PRILOSEC) 40 MG delayed release capsule TAKE 1 CAPSULE EVERY DAY 90 capsule 3    pregabalin (LYRICA) 300 MG capsule Take 1 capsule by mouth daily.      nystatin (MYCOSTATIN) 959199 UNIT/GM cream Apply topically 2 times daily. 30 g 4    isosorbide mononitrate (IMDUR) 60 MG extended release tablet TAKE 1 TABLET EVERY DAY 90 tablet 1    carvedilol (COREG) 25 MG tablet TAKE 1 TABLET TWICE DAILY WITH MEALS 180 tablet 1    triamcinolone (KENALOG) 0.1 % cream APPLY A THIN FILM OF CREAM EXTERNALLY TO THE AFFECTED AREA TWICE DAILY 30 g 10    potassium chloride (KLOR-CON M) 10 MEQ extended release tablet TAKE 1 TABLET FOUR TIMES DAILY 360 tablet 3    acetaminophen (TYLENOL) 500 MG tablet Take 1 tablet by mouth every 6 hours as needed      albuterol sulfate HFA (PROVENTIL;VENTOLIN;PROAIR) 108 (90 Base) MCG/ACT inhaler Inhale 1 puff into the lungs every 4 hours as needed      vitamin D 25 MCG (1000 UT) CAPS Take by mouth daily      clotrimazole-betamethasone (LOTRISONE) 1-0.05 % cream Apply topically 2 times daily      magnesium oxide (MAG-OX) 400 MG tablet Take 1 tablet by mouth 2 times daily      sodium chloride (OCEAN) 0.65 % nasal spray 2 sprays by Nasal route every 8 hours as needed           Review of Systems:  Pertinent items are noted in the History of Present Illness.     Objective     Vital signs for

## 2024-04-25 NOTE — ED PROVIDER NOTES
MRM 3 [facility] TELE  EMERGENCY DEPARTMENT ENCOUNTER       Pt Name: Marilee Oakes  MRN: [id number]  Birthdate 1950  Date of evaluation: 4/25/2024  Provider: Campos Palomares MD   PCP: No primary care provider on file.  Note Started: 6:09 PM 4/25/24     CHIEF COMPLAINT       Chief Complaint   Patient presents with    Cystitis     Pt arrives from [facility] cc of \"kidney issues\" r/t abx for a bladder infection. Pt is also at [facility] for a LLE fracture and MRSA in the lower legs. Denies any pain at at this time        HISTORY OF PRESENT ILLNESS: 1 or more elements      History From: Patient, History limited by: None     Marilee Oakes is a 73 y.o. female who presents with several complaints.  Per family she has had worsening mentation of the past several days, looks more ill, looks more pale.  She is currently at rehab facility where she is receiving IV antibiotics for an infected fractured ankle.  Recently developed a UTI which has since cleared.  She was sent for worsening labs at outpatient facility including worsening GFR of 22.  She has had to change her antibiotics due to rash and kidney function, believes she is currently on vancomycin for MRSA.     Nursing Notes were all reviewed and agreed with or any disagreements were addressed in the HPI.     REVIEW OF SYSTEMS        Positives and Pertinent negatives as per HPI.    PAST HISTORY     Past Medical History:  Past Medical History:   Diagnosis Date    Age-related osteoporosis without current pathological fracture     Anxiety and depression 01/22/2018    Arthritis     OA    Asthma     CAD (coronary artery disease)     Chronic systolic heart failure (HCC)     CKD stage G3b/A1, GFR 30-44 and albumin creatinine ratio <30 mg/g (HCC)     Essential hypertension     GERD (gastroesophageal reflux disease)     History of diverticulitis     diverticulitis    History of echocardiogram 11/2021    LV: Estimated LVEF is 40 - 45%. Visually measured ejection fraction. Normal  infusion (has no administration in time range)   enoxaparin Sodium (LOVENOX) injection 30 mg (has no administration in time range)   ondansetron (ZOFRAN-ODT) disintegrating tablet 4 mg (has no administration in time range)     Or   ondansetron (ZOFRAN) injection 4 mg (has no administration in time range)   polyethylene glycol (GLYCOLAX) packet 17 g (has no administration in time range)   acetaminophen (TYLENOL) tablet 650 mg (has no administration in time range)     Or   acetaminophen (TYLENOL) suppository 650 mg (has no administration in time range)   0.9 % sodium chloride infusion ( IntraVENous New Bag 4/25/24 1625)   melatonin tablet 3 mg (has no administration in time range)   aluminum & magnesium hydroxide-simethicone (MAALOX) 200-200-20 MG/5ML suspension 30 mL (has no administration in time range)   albuterol sulfate HFA (PROVENTIL;VENTOLIN;PROAIR) 108 (90 Base) MCG/ACT inhaler 1 puff (has no administration in time range)   aspirin chewable tablet 81 mg (0 mg Oral Held 4/25/24 1657)   carvedilol (COREG) tablet 25 mg (25 mg Oral Given 4/25/24 1625)   clonazePAM (KLONOPIN) tablet 0.5 mg (has no administration in time range)   isosorbide mononitrate (IMDUR) extended release tablet 60 mg (60 mg Oral Given 4/25/24 1625)   pantoprazole (PROTONIX) tablet 40 mg (has no administration in time range)   pregabalin (LYRICA) capsule 300 mg (300 mg Oral Given 4/25/24 1625)   sodium chloride (OCEAN, BABY AYR) 0.65 % nasal spray 2 spray (has no administration in time range)   glucose chewable tablet 16 g (has no administration in time range)   dextrose bolus 10% 125 mL (has no administration in time range)     Or   dextrose bolus 10% 250 mL (has no administration in time range)   glucagon injection 1 mg (has no administration in time range)   dextrose 10 % infusion (has no administration in time range)   insulin lispro (HUMALOG) injection vial 0-4 Units (0 Units SubCUTAneous Held 4/25/24 1653)   insulin lispro (HUMALOG)

## 2024-04-25 NOTE — PROGRESS NOTES
[facility]         NAME:Marilee Oakes  MRN:[id number]   :1950     Consulted for hilary.    Patient is seen by [facility] in past  I will ask nurse to call them    Thanks.        HILARY (acute kidney injury) (HCC) [N17.9]    has a past medical history of Age-related osteoporosis without current pathological fracture, Anxiety and depression, Arthritis, Asthma, CAD (coronary artery disease), Chronic systolic heart failure (HCC), CKD stage G3b/A1, GFR 30-44 and albumin creatinine ratio <30 mg/g (HCC), Essential hypertension, GERD (gastroesophageal reflux disease), History of diverticulitis, History of echocardiogram, History of migraine, Hypercholesterolemia, Irritable bowel syndrome, Long-term use of high-risk medication, Lumbar spinal stenosis, Morbid (severe) obesity due to excess calories (HCC), Neuropathy, Obesity (BMI 30-39.9), Obstructive sleep apnea, Permanent atrial fibrillation (HCC), Presence of implantable cardioverter-defibrillator (ICD), Presence of Watchman left atrial appendage closure device, Type 2 diabetes mellitus with diabetic neuropathy, without long-term current use of insulin (HCC), Venous insufficiency, and Vitamin B12 deficiency.    has a past surgical history that includes IR KYPHOPLASTY LUMBAR 1 VERTEBRAL BODY (2020); IR KYPHOPLASTY THORACIC 1 VERTEBRAL BODY (2021); FRANCISCA STEREO BREAST BX W LOC DEVICE 1ST LESION RIGHT (Right, 5/10/2022); Cholecystectomy (11/15/2018); IR KYPHOPLASTY LUMBAR 1 VERTEBRAL BODY (2020); pacemaker; Breast biopsy (Left); Hysterectomy; Colonoscopy (N/A, 3/14/2018); pr unlisted procedure cardiac surgery; Appendectomy; IR KYPHOPLASTY THORACIC 1 VERTEBRAL BODY (2021); and Ankle fracture surgery (Left, 3/23/2024).   MD [facility]  [facility] Office  [address], [address]  [place] [address]  Phone - [phone]         Fax - [phone] [facility]  [address]

## 2024-04-25 NOTE — PROGRESS NOTES
P&T-Approved DVT Prophylaxis Dosing    Per P&T Committee-approved protocol enoxaparin 40mg daily has been adjusted to enoxaparin 30mg daily based on weight and renal function as shown in the table below.         Rj Patel RPH

## 2024-04-26 ENCOUNTER — APPOINTMENT (OUTPATIENT)
Facility: HOSPITAL | Age: 74
End: 2024-04-26
Payer: MEDICARE

## 2024-04-26 LAB
ABO + RH BLD: NORMAL
ALBUMIN SERPL-MCNC: 2.4 G/DL (ref 3.5–5)
ALBUMIN/GLOB SERPL: 0.6 (ref 1.1–2.2)
ALP SERPL-CCNC: 152 U/L (ref 45–117)
ALT SERPL-CCNC: 15 U/L (ref 12–78)
ANION GAP SERPL CALC-SCNC: 4 MMOL/L (ref 5–15)
AST SERPL-CCNC: 11 U/L (ref 15–37)
BASOPHILS # BLD: 0 K/UL (ref 0–0.1)
BASOPHILS # BLD: NORMAL K/UL (ref 0–0.1)
BASOPHILS NFR BLD: 1 % (ref 0–1)
BASOPHILS NFR BLD: NORMAL % (ref 0–1)
BILIRUB SERPL-MCNC: 0.5 MG/DL (ref 0.2–1)
BLOOD GROUP ANTIBODIES SERPL: NORMAL
BUN SERPL-MCNC: 40 MG/DL (ref 6–20)
BUN/CREAT SERPL: 17 (ref 12–20)
CALCIUM SERPL-MCNC: 8.4 MG/DL (ref 8.5–10.1)
CHLORIDE SERPL-SCNC: 114 MMOL/L (ref 97–108)
CK SERPL-CCNC: 26 U/L (ref 26–192)
CO2 SERPL-SCNC: 27 MMOL/L (ref 21–32)
CREAT SERPL-MCNC: 2.32 MG/DL (ref 0.55–1.02)
DIFFERENTIAL METHOD BLD: ABNORMAL
DIFFERENTIAL METHOD BLD: NORMAL
EKG ATRIAL RATE: 84 BPM
EKG DIAGNOSIS: NORMAL
EKG Q-T INTERVAL: 470 MS
EKG QRS DURATION: 146 MS
EKG QTC CALCULATION (BAZETT): 542 MS
EKG R AXIS: 256 DEGREES
EKG T AXIS: 62 DEGREES
EKG VENTRICULAR RATE: 80 BPM
EOSINOPHIL # BLD: 0.3 K/UL (ref 0–0.4)
EOSINOPHIL # BLD: NORMAL K/UL (ref 0–0.4)
EOSINOPHIL NFR BLD: 6 % (ref 0–7)
EOSINOPHIL NFR BLD: NORMAL % (ref 0–7)
ERYTHROCYTE [DISTWIDTH] IN BLOOD BY AUTOMATED COUNT: 19.2 % (ref 11.5–14.5)
ERYTHROCYTE [DISTWIDTH] IN BLOOD BY AUTOMATED COUNT: NORMAL % (ref 11.5–14.5)
GLOBULIN SER CALC-MCNC: 3.7 G/DL (ref 2–4)
GLUCOSE BLD STRIP.AUTO-MCNC: 125 MG/DL (ref 65–117)
GLUCOSE BLD STRIP.AUTO-MCNC: 138 MG/DL (ref 65–117)
GLUCOSE BLD STRIP.AUTO-MCNC: 168 MG/DL (ref 65–117)
GLUCOSE BLD STRIP.AUTO-MCNC: 195 MG/DL (ref 65–117)
GLUCOSE SERPL-MCNC: 101 MG/DL (ref 65–100)
HCT VFR BLD AUTO: 28.9 % (ref 35–47)
HCT VFR BLD AUTO: NORMAL % (ref 35–47)
HGB BLD-MCNC: 8.1 G/DL (ref 11.5–16)
HGB BLD-MCNC: NORMAL G/DL (ref 11.5–16)
IMM GRANULOCYTES # BLD AUTO: 0 K/UL (ref 0–0.04)
IMM GRANULOCYTES # BLD AUTO: NORMAL K/UL (ref 0–0.04)
IMM GRANULOCYTES NFR BLD AUTO: 1 % (ref 0–0.5)
IMM GRANULOCYTES NFR BLD AUTO: NORMAL % (ref 0–0.5)
LACTATE SERPL-SCNC: 1.3 MMOL/L (ref 0.4–2)
LYMPHOCYTES # BLD: 0.6 K/UL (ref 0.8–3.5)
LYMPHOCYTES # BLD: NORMAL K/UL (ref 0.8–3.5)
LYMPHOCYTES NFR BLD: 14 % (ref 12–49)
LYMPHOCYTES NFR BLD: NORMAL % (ref 12–49)
MAGNESIUM SERPL-MCNC: 2 MG/DL (ref 1.6–2.4)
MCH RBC QN AUTO: 25 PG (ref 26–34)
MCH RBC QN AUTO: NORMAL PG (ref 26–34)
MCHC RBC AUTO-ENTMCNC: 28 G/DL (ref 30–36.5)
MCHC RBC AUTO-ENTMCNC: NORMAL G/DL (ref 30–36.5)
MCV RBC AUTO: 89.2 FL (ref 80–99)
MCV RBC AUTO: NORMAL FL (ref 80–99)
MONOCYTES # BLD: 0.4 K/UL (ref 0–1)
MONOCYTES # BLD: NORMAL K/UL (ref 0–1)
MONOCYTES NFR BLD: 9 % (ref 5–13)
MONOCYTES NFR BLD: NORMAL % (ref 5–13)
NEUTS SEG # BLD: 3 K/UL (ref 1.8–8)
NEUTS SEG # BLD: NORMAL K/UL (ref 1.8–8)
NEUTS SEG NFR BLD: 69 % (ref 32–75)
NEUTS SEG NFR BLD: NORMAL % (ref 32–75)
NRBC # BLD: 0 K/UL (ref 0–0.01)
NRBC # BLD: NORMAL K/UL (ref 0–0.01)
NRBC BLD-RTO: 0 PER 100 WBC
NRBC BLD-RTO: NORMAL PER 100 WBC
PHOSPHATE SERPL-MCNC: 5.4 MG/DL (ref 2.6–4.7)
PLATELET # BLD AUTO: 197 K/UL (ref 150–400)
PLATELET # BLD AUTO: NORMAL K/UL (ref 150–400)
PMV BLD AUTO: 11.2 FL (ref 8.9–12.9)
PMV BLD AUTO: NORMAL FL (ref 8.9–12.9)
POTASSIUM SERPL-SCNC: 3.5 MMOL/L (ref 3.5–5.1)
PROT SERPL-MCNC: 6.1 G/DL (ref 6.4–8.2)
RBC # BLD AUTO: 3.24 M/UL (ref 3.8–5.2)
RBC # BLD AUTO: NORMAL M/UL (ref 3.8–5.2)
RBC MORPH BLD: ABNORMAL
RBC MORPH BLD: NORMAL
RBC MORPH BLD: NORMAL
SERVICE CMNT-IMP: ABNORMAL
SODIUM SERPL-SCNC: 145 MMOL/L (ref 136–145)
SPECIMEN EXP DATE BLD: NORMAL
WBC # BLD AUTO: 4.3 K/UL (ref 3.6–11)
WBC # BLD AUTO: NORMAL K/UL (ref 3.6–11)

## 2024-04-26 PROCEDURE — 84100 ASSAY OF PHOSPHORUS: CPT

## 2024-04-26 PROCEDURE — 97530 THERAPEUTIC ACTIVITIES: CPT

## 2024-04-26 PROCEDURE — 82962 GLUCOSE BLOOD TEST: CPT

## 2024-04-26 PROCEDURE — 1100000003 HC PRIVATE W/ TELEMETRY

## 2024-04-26 PROCEDURE — 6370000000 HC RX 637 (ALT 250 FOR IP): Performed by: INTERNAL MEDICINE

## 2024-04-26 PROCEDURE — 6360000002 HC RX W HCPCS: Performed by: INTERNAL MEDICINE

## 2024-04-26 PROCEDURE — 2700000000 HC OXYGEN THERAPY PER DAY

## 2024-04-26 PROCEDURE — 97162 PT EVAL MOD COMPLEX 30 MIN: CPT

## 2024-04-26 PROCEDURE — 86900 BLOOD TYPING SEROLOGIC ABO: CPT

## 2024-04-26 PROCEDURE — 6370000000 HC RX 637 (ALT 250 FOR IP): Performed by: STUDENT IN AN ORGANIZED HEALTH CARE EDUCATION/TRAINING PROGRAM

## 2024-04-26 PROCEDURE — 85025 COMPLETE CBC W/AUTO DIFF WBC: CPT

## 2024-04-26 PROCEDURE — 97535 SELF CARE MNGMENT TRAINING: CPT

## 2024-04-26 PROCEDURE — 36415 COLL VENOUS BLD VENIPUNCTURE: CPT

## 2024-04-26 PROCEDURE — 74176 CT ABD & PELVIS W/O CONTRAST: CPT

## 2024-04-26 PROCEDURE — 97165 OT EVAL LOW COMPLEX 30 MIN: CPT

## 2024-04-26 PROCEDURE — 86901 BLOOD TYPING SEROLOGIC RH(D): CPT

## 2024-04-26 PROCEDURE — 83735 ASSAY OF MAGNESIUM: CPT

## 2024-04-26 PROCEDURE — 86850 RBC ANTIBODY SCREEN: CPT

## 2024-04-26 PROCEDURE — 99223 1ST HOSP IP/OBS HIGH 75: CPT | Performed by: INTERNAL MEDICINE

## 2024-04-26 PROCEDURE — 2580000003 HC RX 258: Performed by: INTERNAL MEDICINE

## 2024-04-26 PROCEDURE — 83605 ASSAY OF LACTIC ACID: CPT

## 2024-04-26 PROCEDURE — 94640 AIRWAY INHALATION TREATMENT: CPT

## 2024-04-26 PROCEDURE — 80053 COMPREHEN METABOLIC PANEL: CPT

## 2024-04-26 PROCEDURE — 82550 ASSAY OF CK (CPK): CPT

## 2024-04-26 RX ORDER — FLUCONAZOLE 200 MG/1
200 TABLET ORAL DAILY
Status: COMPLETED | OUTPATIENT
Start: 2024-04-26 | End: 2024-04-28

## 2024-04-26 RX ORDER — PREGABALIN 75 MG/1
150 CAPSULE ORAL 2 TIMES DAILY
Status: DISCONTINUED | OUTPATIENT
Start: 2024-04-26 | End: 2024-04-29

## 2024-04-26 RX ADMIN — ENOXAPARIN SODIUM 30 MG: 100 INJECTION SUBCUTANEOUS at 10:00

## 2024-04-26 RX ADMIN — SODIUM CHLORIDE: 9 INJECTION, SOLUTION INTRAVENOUS at 15:08

## 2024-04-26 RX ADMIN — FLUCONAZOLE 200 MG: 200 TABLET ORAL at 12:08

## 2024-04-26 RX ADMIN — PREGABALIN 150 MG: 75 CAPSULE ORAL at 21:47

## 2024-04-26 RX ADMIN — CLONAZEPAM 0.5 MG: 0.5 TABLET ORAL at 21:47

## 2024-04-26 RX ADMIN — ASPIRIN 81 MG: 81 TABLET, CHEWABLE ORAL at 10:00

## 2024-04-26 RX ADMIN — CEFTAROLINE FOSAMIL 300 MG: 600 POWDER, FOR SOLUTION INTRAVENOUS at 17:51

## 2024-04-26 RX ADMIN — CARVEDILOL 25 MG: 12.5 TABLET, FILM COATED ORAL at 10:00

## 2024-04-26 RX ADMIN — ISOSORBIDE MONONITRATE 60 MG: 30 TABLET, EXTENDED RELEASE ORAL at 10:00

## 2024-04-26 RX ADMIN — CARVEDILOL 25 MG: 12.5 TABLET, FILM COATED ORAL at 17:55

## 2024-04-26 RX ADMIN — ARFORMOTEROL TARTRATE: 15 SOLUTION RESPIRATORY (INHALATION) at 07:41

## 2024-04-26 RX ADMIN — PANTOPRAZOLE SODIUM 40 MG: 40 TABLET, DELAYED RELEASE ORAL at 06:40

## 2024-04-26 RX ADMIN — MELATONIN 3 MG: at 21:47

## 2024-04-26 RX ADMIN — SODIUM CHLORIDE, PRESERVATIVE FREE 10 ML: 5 INJECTION INTRAVENOUS at 21:48

## 2024-04-26 RX ADMIN — SODIUM CHLORIDE, PRESERVATIVE FREE 10 ML: 5 INJECTION INTRAVENOUS at 10:00

## 2024-04-26 RX ADMIN — SODIUM CHLORIDE: 9 INJECTION, SOLUTION INTRAVENOUS at 03:15

## 2024-04-26 RX ADMIN — ARFORMOTEROL TARTRATE: 15 SOLUTION RESPIRATORY (INHALATION) at 20:14

## 2024-04-26 ASSESSMENT — PAIN SCALES - GENERAL: PAINLEVEL_OUTOF10: 0

## 2024-04-26 NOTE — PLAN OF CARE
Problem: Safety - Adult  Goal: Free from fall injury  Outcome: Progressing  Flowsheets (Taken 4/26/2024 0215)  Free From Fall Injury:   Based on caregiver fall risk screen, instruct family/caregiver to ask for assistance with transferring infant if caregiver noted to have fall risk factors   Instruct family/caregiver on patient safety

## 2024-04-26 NOTE — CARE COORDINATION
Care Management Initial Assessment       RUR: 33% high   Readmission? Yes - 4/3-4/12/24 at [facility] for MRSA bacteremia   1st IM letter given? Yes - 4/25/24 by Patient Access  1st  letter given: No - N/A      Initial note: Chart review completed prior to assessment. CM completed assessment with pt's spouse, Wesley Oakes, per pt's preference. CM introduced self, role of CM, verified demographics to ensure accuracy, and discussed transition of care planning. Pt presented from [facility]; spouse reports that pt has been there approximately 2 weeks now. Spouse reports being displeased with care and rehab at this facility, voices that he would like for pt to return home at time of d/c with home health services. Preference for home health agency is [facility]. Need clarification on status of pt's IV ABX which were being administered by [facility]; may have IV ABX needs at d/c? Pt has the following DME at home: hospital bed, shower chair, rollator, home oxygen ([employer] wheelchair, and sliding board. Spouse is main caregiver support but also endorses family support of two local daughters and two sisters. Spouse anticipates need for medical transport at d/c. AVS updated with pt's upcoming new pt PCP appt. Pt's previous PCP office ([facility]) continues to write medication refills until pt is seen by new [facility] provider, as pt's previous provider left the practice. This office should receive home health orders to see if one of their providers will follow in interim.     Care management will continue to be available to assist as transition of care planning needs arise. Full readmission assessment below:         04/26/24 1141   Service Assessment   Patient Orientation Unable to Assess   History Provided By Spouse   Primary Caregiver Spouse   Accompanied By/Relationship Spouse and two sisters at bedside previously   Support Systems Spouse/Significant Other   Patient's  present: Spouse Wesley Oakes     Healthcare Decision Maker:   Primary Decision Maker: Wesley Oakes - Spouse - [phone]    Secondary Decision Maker: Shelia Malik - Child - [phone]  Click here to complete Healthcare Decision Makers including selection of the Healthcare Decision Maker Relationship (ie \"Primary\").   Today we documented Decision Maker(s) consistent with ACP documents on file.    Content/Action Overview:  Has ACP document(s) on file - reflects the patient's care preferences    Length of Voluntary ACP Conversation in minutes:  <16 minutes (Non-Billable)    AQUILES Pruitt.  Care Manager, [facility]  [phone]/Available on Perfect Serve

## 2024-04-26 NOTE — PROGRESS NOTES
Hospitalist Progress Note    NAME:   Marilee Oakes   : 1950   MRN: [id number]     Date/Time: 2024 5:30 PM  Patient PCP: No primary care provider on file.    Estimated discharge date:   Barriers: antibiotic plan, may need Orthopedic involvement. Patient and family adamantly do not want patient to go back to [facility]      Assessment / Plan:    Acute on chronic renal failure - improving  CKD 3 with baseline creatinine between 1.2-1.6.  Monitor patient telemetry.  Continue gentle hydration  Hold Bumex.  consulted nephrology for further input.     Toxic metabolic encephalopathy - resolved  Patient has been lethargic.  CT head is negative.  Continue to monitor in telemetry with neurochecks every 4 hours.  Treat underlying cause.  If mentation does not improve, consider neurology consult.     MRSA wound infection  Recurrent UTI  Left ankle fracture s/p ORIF  Blood cultures taken from [facility] were positive for MRSA per MD at [facility], prompting prior admission. No blood cultures here are positive. Reportedly had rash with Daptomycin and now renal failure with Vancomycin.  Left leg bandaged.  Patient is afebrile, white count is normal, procalcitonin is negative.  - ID consulted, appreciate assistance  - may need Orthopedic involvement, will discuss with ID  - PICC line is present     Anemia  Baseline hemoglobin between 8-9.  Currently hemoglobin is 7.5.  Occult blood was negative.  Monitor CBC.  Transfuse if hemoglobin is less than 7.0.     CAD  CHF with ICD  A-fib with Watchman device  Continue with aspirin, Imdur, Coreg.     Diabetes mellitus  Neuropathy  Fingerstick glucose and sliding scale insulin.   Continue with Lyrica.     Anxiety  Continue with Klonopin.     GERD  Continue with Protonix.         Medical Decision Making:   I personally reviewed labs: CBC, BMP  I personally reviewed imaging: CT abd  I personally reviewed EKG:   Toxic drug monitoring: H&H while patient is on

## 2024-04-26 NOTE — PROGRESS NOTES
Nursing contacted Nocturnist/cross cover provider and notified patient lab result of hgb 5.8 down from 7.5 w/o active bleeding reported. VSS. No other reported concerns at this time. Ordered stat recheck h/h and type and screen, pt will need blood consent as no electronic one appears in computer. However, has ivf running, asked nurse to please ensure whoever redraws the labs that ivf off for at least 2 mins first- consider trs if hgb remains <7.0 per dayshift note RECs reviewed. Will defer further evaluation/management to the day shift primary care team. Patient denies any further complaints or concerns. No acute distress reported. Nursing to notify Hospitalist for further/continued concerns. Will remain available overnight for further concerns if nursing/patient needs.     Non-billable note.

## 2024-04-26 NOTE — PLAN OF CARE
Problem: Occupational Therapy - Adult  Goal: By Discharge: Performs self-care activities at highest level of function for planned discharge setting.  See evaluation for individualized goals.  Description: FUNCTIONAL STATUS PRIOR TO ADMISSION:  Patient is a questionable historian, admitted from [facility] rehab. Note recent hospitalization and L ankle ORIF (3/23/24), discharging to [facility] with NWB LLE precautions. Prior to initial injury, patient was modified independent with ADLs and functional mobility using rollator.     Receives Help From: Family, ADL Assistance: Needs assistance, Homemaking Assistance: Needs assistance, Ambulation Assistance: Needs assistance, Active : No     HOME SUPPORT: Patient lived with supportive spouse, however admitted from [facility].    Occupational Therapy Goals:  Initiated 4/26/2024  1.  Patient will perform adhere to NWB LLE throughout functional activity/mobility with minimal verbal cues within 7 day(s).  2.  Patient will perform upper body dressing and bathing with Supervision within 7 day(s).  3.  Patient will perform seated lower body bathing using lateral weight-shifting technique with Minimal Assist within 7 day(s).  4.  Patient will perform lateral toilet transfers to bariatric drop-arm [facility] with Supervision / Set-up within 7 day(s).  5.  Patient will perform all aspects of seated toileting with Minimal Assist within 7 day(s).  6.  Patient will participate in upper extremity therapeutic exercise/activities with Supervision for 10 minutes within 7 day(s).    7.  Patient will utilize energy conservation techniques during functional activities with verbal cues within 7 day(s).  Outcome: Progressing   OCCUPATIONAL THERAPY EVALUATION    Patient: Marilee Oakes (73 y.o. female)  Date: 4/26/2024  Primary Diagnosis: HILARY (acute kidney injury) (HCC) [N17.9]  Anemia, unspecified type [D64.9]         Precautions: Fall Risk                  ASSESSMENT :  The patient is limited by decreased

## 2024-04-26 NOTE — PROGRESS NOTES
Spiritual Care Assessment/Progress Note  [facility]    Name: Marilee Oakes MRN: [id number]    Age: 73 y.o.     Sex: female   Language: English     Date: 4/26/2024            Total Time Calculated: 15 min              Spiritual Assessment begun in [facility] 3 MED TELE  Service Provided For: Patient  Referral/Consult From: Rounding  Encounter Overview/Reason: Initial Encounter    Spiritual beliefs:      [x] Involved in a martin tradition/spiritual practice: Rastafari     [] Supported by a martin community:      [] Claims no spiritual orientation:      [] Seeking spiritual identity:           [] Adheres to an individual form of spirituality:      [] Not able to assess:                Identified resources for coping and support system:   Support System: Spouse       [x] Prayer                  [] Devotional reading               [] Music                  [] Guided Imagery     [] Pet visits                                        [] Other: (COMMENT)     Specific area/focus of visit   Encounter:    Crisis:    Spiritual/Emotional needs: Type: Spiritual Support  Ritual, Rites and Sacraments:    Grief, Loss, and Adjustments:    Ethics/Mediation:    Behavioral Health:    Palliative Care:    Advance Care Planning:           Narrative:   Reviewed chart prior to visit. Stepped into room. Patient was available for a visit. She shared about her stay at [facility] and other hospitals. She is grateful for the staff at [facility]. She shared about her ankle and the need to allow time for it to heal. Patient was receptive to prayer and this 's visit - both listening presence and conversation. She was grateful for the visit.     JEAN Fragoso.  PRN    paging service [phone]

## 2024-04-26 NOTE — CONSULTS
Infectious Disease Consult    Date of Consultation:  April 26, 2024  Reason for Consultation:MRSA bacteremia  Referring Physician: Dr Mendel  Date of Admission:4/25/2024      Impression    HILARY on CKD3  Cr 2.32, previously 2.59  Nephrology following.    Acute metabolic encephalopathy  Resolved  Negative CT head.      MRSA bacteremia  Blood cultures + for MRSA at SNF  Details unavailable at this time  D/w SNF MD today, she confirms  Blood cultures 4/3-no growth.  JUAN MANUEL negative.  Negative Blood cultures this admission.  D/c on Daptomycin  S/p macular rash with Daptomycin.  Changed to Vancomycin on 4/22.     Bimalleolar left ankle fracture  S/p ORIF L/ankle fracture 3/23  Swelling + & serosanguinous drainage at surgical site  S/p incisional wound VAC placement 4/4.  Wound culture 4/3+ for MRSA  Culture reported from drainage at surgical site.    Yeast + in UA  Fuconazole po x 3 days.       CAD  Acute on chronic systolic CHF  A.fib  Watchmen present.  Retained RV pacing lead+.         Diabetes Mellitus 2   On SSI   A1c 5.8        COPD  LEELA on CPAP      Obesity  BMI 35.81      Plan  Change to Ceftaroline IV with planned end date 5/15  Pharmacy to adjust to Cr clearance  Pt cannot use Daptomycin, worsening Cr with Vancomycin use  D/w pt that Ceftaroline iv is not  standard of care for MRSA  bacteremia.      Extensive review of chart notes, labs, imaging, cultures done  Additionally review of done: Recent reports-Labs, cultures, imaging  D/w -hospitalist, RN  Marilee S Violette is a 73 y.o. female with past medical history significant for MRSA bacteremia, ORIF of left ankle fracture, wound drainage at surgical site  & wound culture positive for MRSA.  Patient is well-known to ID service.  She was treated for MRSA bacteremia and surgical site infection during her recent admission 4/3 -4/12.  Patient also has history of CKD 3, COPD, LEELA, CAD, diabetes type 2, hypertension.  She was discharged on daptomycin IV.  ID service was  disease      CT ABDOMEN PELVIS WO CONTRAST Additional Contrast? None    Result Date: 4/26/2024  EXAM: CT ABDOMEN PELVIS WO CONTRAST INDICATION: ? hydronephrosis and Pyelonephritis/ calculi COMPARISON: CT 12/18/2023 IV CONTRAST: None. ORAL CONTRAST: None. TECHNIQUE: Thin axial images were obtained through the abdomen and pelvis. Coronal and sagittal reformats were generated. CT dose reduction was achieved through use of a standardized protocol tailored for this examination and automatic exposure control for dose modulation. The absence of intravenous and oral contrast material reduces the sensitivity for evaluation of the vasculature and solid organs. Lack of IV contrast substantially diminishes the capacity of CT to evaluate for pyelonephritis. FINDINGS: LOWER THORAX: Trace to small bilateral pleural effusions. AICD artifacts. LIVER: No mass. BILIARY TREE: Cholecystectomy. CBD is not dilated. SPLEEN: Splenomegaly again shown. Splenic length 18 cm, similar to previous. PANCREAS: No focal abnormality. ADRENALS: Unremarkable. KIDNEYS/URETERS: No hydronephrosis or hydroureter. Numerous tiny nonobstructing renal calculi demonstrated bilaterally. No ureteral calculus demonstrated. STOMACH: Unremarkable. SMALL BOWEL: No dilatation or wall thickening. COLON: No dilatation or wall thickening. APPENDIX: Not identified. PERITONEUM: No ascites or pneumoperitoneum. RETROPERITONEUM: No lymphadenopathy or aortic aneurysm. Atherosclerotic calcifications. REPRODUCTIVE ORGANS: Hysterectomy. URINARY BLADDER: Nondistended. No bladder calculus or mass lesion evident. BONES: No destructive bone lesion. Osteopenia. T12 and L1 vertebral compression fractures with cement augmentation again shown ABDOMINAL WALL: Bilateral indirect inguinal hernias containing fat, larger on the right. ADDITIONAL COMMENTS: N/A     No acute abdominal or pelvic findings. Splenomegaly again shown. No hydronephrosis or hydroureter. Tiny bilateral renal calculi.

## 2024-04-26 NOTE — PROGRESS NOTES
Called by imaging department, patient was having a renal U/S but before completion, she declined and started shouting, and seemed confused for the date of birth at this time, tried to redirect but could not be redirected to agree.  Has been sleeping most of the night and declined to take her night pill.

## 2024-04-26 NOTE — PLAN OF CARE
Problem: Physical Therapy - Adult  Goal: By Discharge: Performs mobility at highest level of function for planned discharge setting.  See evaluation for individualized goals.  Description: FUNCTIONAL STATUS PRIOR TO ADMISSION: The patient is a questionable historian. Presenting from [facility]. Multiple recent hospitalizations.  At [facility] for rehabilitation s/p fall with L ankle fx ORIF done on 3/23/24. pt is NWB and was working on scooting, lateral transfers, and standing at rehab in parallel bars to this point (per patient report).    HOME SUPPORT PRIOR TO ADMISSION: The patient lived with spouse. Admitted from [facility].    Physical Therapy Goals  Initiated 4/26/2024  1.  Patient will move from supine to sit and sit to supine, scoot up and down, and roll side to side in bed with modified independence within 7 day(s).    2.  Patient will perform sit to stand with moderate assistance within 7 day(s).  3.  Patient will transfer from bed to chair and chair to bed with contact guard assist using the least restrictive device within 7 day(s).  Outcome: Progressing     PHYSICAL THERAPY EVALUATION    Patient: Marilee Oakes (73 y.o. female)  Date: 4/26/2024  Primary Diagnosis: HILARY (acute kidney injury) ([facility]) [N17.9]  Anemia, unspecified type [D64.9]       Precautions: Restrictions/Precautions: Fall Risk                R LE NWB    ASSESSMENT :   DEFICITS/IMPAIRMENTS:   The patient is limited by decreased functional mobility, independence in ADLs, high-level IADLs, ROM, strength, activity tolerance, endurance, cognition, attention/concentration, coordination, balance, posture, increased pain levels. Patient presenting from [facility] where she was rehabilitating from L ankle ORIF s/pf fall. She is loosely oriented but is confused and is a questionable historian. Reportedly, she is working on lateral transfers with sliding board and standing with parallel bars at [facility] and was planned for [place] home 4/29/24 per patient. She has

## 2024-04-26 NOTE — PROGRESS NOTES
04/26/24        I have been asked to see this patient by Mendel, David L, MD  for advice/opinion re: -----A KI on CKD                                                        Assessment:         HILARY on CKD stage IIIb  Acute kidney injury grade 2 KDIGO classification  Urinary incontinence  ?  Urosepsis-positive for yeast  Chronic anemia Discussion:     Creatinine trend 2.59 => 2.32  Hemoglobin low at 8.1  Only 1 kidney was visualized and the test was stopped patient's noncooperation  Known to have baseline CKD stage IIIb A,  lymphedema and type 2 diabetes without complications  Follows with Dr. Evangelista-baseline creatinine 1.3-1.7    Plan:   Continue IV fluids  CT scan of abdomen pelvis without contrast  Add Diflucan                 Thanks for consulting me. Renal service will follow patient with you.Please don't hesitate to contact me if any questions arise of if I can assist in any manner.      Kiki Eason MD  Cell no- [phone]  Available on perfect serve.          Signed By: Kiki Eason MD     April 26, 2024           Consult Date: 4/26/2024      Subjective   Seen and examined.  Not in any pain  On IV fluids  Afebrile  Wearing depends  PMH:  Past Medical History:   Diagnosis Date    Age-related osteoporosis without current pathological fracture     Anxiety and depression 01/22/2018    Arthritis     OA    Asthma     CAD (coronary artery disease)     Chronic systolic heart failure (HCC)     CKD stage G3b/A1, GFR 30-44 and albumin creatinine ratio <30 mg/g (HCC)     Essential hypertension     GERD (gastroesophageal reflux disease)     History of diverticulitis     diverticulitis    History of echocardiogram 11/2021    LV: Estimated LVEF is 40 - 45%. Visually measured ejection fraction. Normal cavity size and wall thickness. Globally reduced systolic function.  LA:

## 2024-04-27 ENCOUNTER — APPOINTMENT (OUTPATIENT)
Facility: HOSPITAL | Age: 74
End: 2024-04-27
Payer: MEDICARE

## 2024-04-27 PROBLEM — E08.8 DIABETES MELLITUS DUE TO UNDERLYING CONDITION, CONTROLLED, WITH COMPLICATION (HCC): Status: ACTIVE | Noted: 2024-04-27

## 2024-04-27 PROBLEM — B37.49 YEAST UTI: Status: ACTIVE | Noted: 2024-04-27

## 2024-04-27 PROBLEM — T36.95XA ANTIBIOTIC CAUSING ADVERSE EFFECT: Status: ACTIVE | Noted: 2024-04-27

## 2024-04-27 PROBLEM — J44.9 CHRONIC OBSTRUCTIVE PULMONARY DISEASE (HCC): Status: ACTIVE | Noted: 2024-04-27

## 2024-04-27 LAB
ANION GAP SERPL CALC-SCNC: 6 MMOL/L (ref 5–15)
ARTERIAL PATENCY WRIST A: POSITIVE
BASE DEFICIT BLD-SCNC: 6.3 MMOL/L
BDY SITE: ABNORMAL
BUN SERPL-MCNC: 36 MG/DL (ref 6–20)
BUN/CREAT SERPL: 15 (ref 12–20)
CALCIUM SERPL-MCNC: 8.2 MG/DL (ref 8.5–10.1)
CHLORIDE SERPL-SCNC: 115 MMOL/L (ref 97–108)
CO2 SERPL-SCNC: 24 MMOL/L (ref 21–32)
CREAT SERPL-MCNC: 2.44 MG/DL (ref 0.55–1.02)
CREAT UR-MCNC: 110 MG/DL
GAS FLOW.O2 O2 DELIVERY SYS: ABNORMAL
GLUCOSE BLD STRIP.AUTO-MCNC: 118 MG/DL (ref 65–117)
GLUCOSE BLD STRIP.AUTO-MCNC: 185 MG/DL (ref 65–117)
GLUCOSE BLD STRIP.AUTO-MCNC: 224 MG/DL (ref 65–117)
GLUCOSE SERPL-MCNC: 131 MG/DL (ref 65–100)
HCO3 BLD-SCNC: 20.8 MMOL/L (ref 22–26)
O2/TOTAL GAS SETTING VFR VENT: 2 %
PCO2 BLD: 48 MMHG (ref 35–45)
PH BLD: 7.24 (ref 7.35–7.45)
PO2 BLD: 87 MMHG (ref 80–100)
POTASSIUM SERPL-SCNC: 3.4 MMOL/L (ref 3.5–5.1)
PROT UR-MCNC: 137 MG/DL (ref 0–11.9)
PROT/CREAT UR-RTO: 1.2
SAO2 % BLD: 94.7 % (ref 92–97)
SERVICE CMNT-IMP: ABNORMAL
SODIUM SERPL-SCNC: 145 MMOL/L (ref 136–145)
SPECIMEN TYPE: ABNORMAL

## 2024-04-27 PROCEDURE — 6370000000 HC RX 637 (ALT 250 FOR IP): Performed by: STUDENT IN AN ORGANIZED HEALTH CARE EDUCATION/TRAINING PROGRAM

## 2024-04-27 PROCEDURE — 2580000003 HC RX 258: Performed by: INTERNAL MEDICINE

## 2024-04-27 PROCEDURE — 2700000000 HC OXYGEN THERAPY PER DAY

## 2024-04-27 PROCEDURE — 6360000002 HC RX W HCPCS: Performed by: INTERNAL MEDICINE

## 2024-04-27 PROCEDURE — 6370000000 HC RX 637 (ALT 250 FOR IP): Performed by: INTERNAL MEDICINE

## 2024-04-27 PROCEDURE — 84156 ASSAY OF PROTEIN URINE: CPT

## 2024-04-27 PROCEDURE — 2060000000 HC ICU INTERMEDIATE R&B

## 2024-04-27 PROCEDURE — 2580000003 HC RX 258: Performed by: STUDENT IN AN ORGANIZED HEALTH CARE EDUCATION/TRAINING PROGRAM

## 2024-04-27 PROCEDURE — 82803 BLOOD GASES ANY COMBINATION: CPT

## 2024-04-27 PROCEDURE — 36600 WITHDRAWAL OF ARTERIAL BLOOD: CPT

## 2024-04-27 PROCEDURE — 82962 GLUCOSE BLOOD TEST: CPT

## 2024-04-27 PROCEDURE — 6360000002 HC RX W HCPCS: Performed by: STUDENT IN AN ORGANIZED HEALTH CARE EDUCATION/TRAINING PROGRAM

## 2024-04-27 PROCEDURE — 94640 AIRWAY INHALATION TREATMENT: CPT

## 2024-04-27 PROCEDURE — 80048 BASIC METABOLIC PNL TOTAL CA: CPT

## 2024-04-27 PROCEDURE — 5A09557 ASSISTANCE WITH RESPIRATORY VENTILATION, GREATER THAN 96 CONSECUTIVE HOURS, CONTINUOUS POSITIVE AIRWAY PRESSURE: ICD-10-PCS | Performed by: STUDENT IN AN ORGANIZED HEALTH CARE EDUCATION/TRAINING PROGRAM

## 2024-04-27 PROCEDURE — 82570 ASSAY OF URINE CREATININE: CPT

## 2024-04-27 PROCEDURE — 71045 X-RAY EXAM CHEST 1 VIEW: CPT

## 2024-04-27 PROCEDURE — 36415 COLL VENOUS BLD VENIPUNCTURE: CPT

## 2024-04-27 PROCEDURE — 94660 CPAP INITIATION&MGMT: CPT

## 2024-04-27 PROCEDURE — 6360000002 HC RX W HCPCS: Performed by: NURSE PRACTITIONER

## 2024-04-27 RX ORDER — IPRATROPIUM BROMIDE AND ALBUTEROL SULFATE 2.5; .5 MG/3ML; MG/3ML
1 SOLUTION RESPIRATORY (INHALATION)
Status: DISCONTINUED | OUTPATIENT
Start: 2024-04-27 | End: 2024-04-27

## 2024-04-27 RX ORDER — PREDNISONE 20 MG/1
40 TABLET ORAL DAILY
Status: DISCONTINUED | OUTPATIENT
Start: 2024-04-27 | End: 2024-04-27

## 2024-04-27 RX ORDER — ALBUTEROL SULFATE 2.5 MG/3ML
2.5 SOLUTION RESPIRATORY (INHALATION)
Status: DISCONTINUED | OUTPATIENT
Start: 2024-04-27 | End: 2024-05-10 | Stop reason: HOSPADM

## 2024-04-27 RX ORDER — IPRATROPIUM BROMIDE AND ALBUTEROL SULFATE 2.5; .5 MG/3ML; MG/3ML
1 SOLUTION RESPIRATORY (INHALATION)
Status: DISCONTINUED | OUTPATIENT
Start: 2024-04-28 | End: 2024-05-10 | Stop reason: HOSPADM

## 2024-04-27 RX ORDER — FUROSEMIDE 10 MG/ML
40 INJECTION INTRAMUSCULAR; INTRAVENOUS ONCE
Status: COMPLETED | OUTPATIENT
Start: 2024-04-27 | End: 2024-04-27

## 2024-04-27 RX ORDER — DIAZEPAM 5 MG/ML
2.5 INJECTION, SOLUTION INTRAMUSCULAR; INTRAVENOUS ONCE
Status: COMPLETED | OUTPATIENT
Start: 2024-04-27 | End: 2024-04-27

## 2024-04-27 RX ORDER — FUROSEMIDE 10 MG/ML
20 INJECTION INTRAMUSCULAR; INTRAVENOUS 2 TIMES DAILY
Status: DISCONTINUED | OUTPATIENT
Start: 2024-04-28 | End: 2024-05-10 | Stop reason: HOSPADM

## 2024-04-27 RX ORDER — ALBUTEROL SULFATE 90 UG/1
1 AEROSOL, METERED RESPIRATORY (INHALATION)
Status: DISCONTINUED | OUTPATIENT
Start: 2024-04-27 | End: 2024-04-27 | Stop reason: CLARIF

## 2024-04-27 RX ADMIN — CARVEDILOL 25 MG: 12.5 TABLET, FILM COATED ORAL at 18:55

## 2024-04-27 RX ADMIN — CEFTAROLINE FOSAMIL 300 MG: 600 POWDER, FOR SOLUTION INTRAVENOUS at 00:44

## 2024-04-27 RX ADMIN — ARFORMOTEROL TARTRATE: 15 SOLUTION RESPIRATORY (INHALATION) at 07:56

## 2024-04-27 RX ADMIN — CLONAZEPAM 0.5 MG: 0.5 TABLET ORAL at 22:48

## 2024-04-27 RX ADMIN — ISOSORBIDE MONONITRATE 60 MG: 30 TABLET, EXTENDED RELEASE ORAL at 09:30

## 2024-04-27 RX ADMIN — PANTOPRAZOLE SODIUM 40 MG: 40 TABLET, DELAYED RELEASE ORAL at 06:37

## 2024-04-27 RX ADMIN — WATER 125 MG: 1 INJECTION INTRAMUSCULAR; INTRAVENOUS; SUBCUTANEOUS at 22:49

## 2024-04-27 RX ADMIN — INSULIN LISPRO 1 UNITS: 100 INJECTION, SOLUTION INTRAVENOUS; SUBCUTANEOUS at 18:55

## 2024-04-27 RX ADMIN — CEFTAROLINE FOSAMIL 300 MG: 600 POWDER, FOR SOLUTION INTRAVENOUS at 17:02

## 2024-04-27 RX ADMIN — PREGABALIN 150 MG: 75 CAPSULE ORAL at 22:47

## 2024-04-27 RX ADMIN — PREDNISONE 40 MG: 20 TABLET ORAL at 10:41

## 2024-04-27 RX ADMIN — FUROSEMIDE 40 MG: 10 INJECTION, SOLUTION INTRAMUSCULAR; INTRAVENOUS at 22:48

## 2024-04-27 RX ADMIN — SODIUM CHLORIDE: 9 INJECTION, SOLUTION INTRAVENOUS at 16:30

## 2024-04-27 RX ADMIN — CEFTAROLINE FOSAMIL 300 MG: 600 POWDER, FOR SOLUTION INTRAVENOUS at 09:45

## 2024-04-27 RX ADMIN — ENOXAPARIN SODIUM 30 MG: 100 INJECTION SUBCUTANEOUS at 09:30

## 2024-04-27 RX ADMIN — IPRATROPIUM BROMIDE AND ALBUTEROL SULFATE 1 DOSE: .5; 3 SOLUTION RESPIRATORY (INHALATION) at 17:38

## 2024-04-27 RX ADMIN — SODIUM CHLORIDE, PRESERVATIVE FREE 10 ML: 5 INJECTION INTRAVENOUS at 09:31

## 2024-04-27 RX ADMIN — ALBUTEROL SULFATE 2.5 MG: 2.5 SOLUTION RESPIRATORY (INHALATION) at 17:45

## 2024-04-27 RX ADMIN — PREGABALIN 150 MG: 75 CAPSULE ORAL at 09:30

## 2024-04-27 RX ADMIN — IPRATROPIUM BROMIDE AND ALBUTEROL SULFATE 1 DOSE: .5; 3 SOLUTION RESPIRATORY (INHALATION) at 20:29

## 2024-04-27 RX ADMIN — IPRATROPIUM BROMIDE AND ALBUTEROL SULFATE 1 DOSE: .5; 3 SOLUTION RESPIRATORY (INHALATION) at 14:50

## 2024-04-27 RX ADMIN — DIAZEPAM 2.5 MG: 5 INJECTION, SOLUTION INTRAMUSCULAR; INTRAVENOUS at 09:30

## 2024-04-27 RX ADMIN — ASPIRIN 81 MG: 81 TABLET, CHEWABLE ORAL at 09:34

## 2024-04-27 RX ADMIN — FLUCONAZOLE 200 MG: 200 TABLET ORAL at 09:30

## 2024-04-27 RX ADMIN — CARVEDILOL 25 MG: 12.5 TABLET, FILM COATED ORAL at 09:30

## 2024-04-27 RX ADMIN — ARFORMOTEROL TARTRATE: 15 SOLUTION RESPIRATORY (INHALATION) at 20:29

## 2024-04-27 ASSESSMENT — PAIN SCALES - GENERAL: PAINLEVEL_OUTOF10: 0

## 2024-04-27 NOTE — PROGRESS NOTES
End of Shift Note    Bedside shift change report given to AGNES Kirkland (oncoming nurse) by Rosalind Ware LPN (offgoing nurse).  Report included the following information SBAR, Kardex, Intake/Output, MAR, Accordion, Recent Results, Med Rec Status, and Cardiac Rhythm v-paced    Shift worked: [id number]   Shift summary and any significant changes:       Labs collected. No complaints of pain during shift. No complaints at the time of shift change. Plan of care ongoing.      Concerns for physician to address:  N/A     [facility] phone for oncoming shift:   [phone]       Activity:     Number times ambulated in hallways past shift: 0  Number of times OOB to chair past shift: 0    Cardiac:   Cardiac Monitoring: Yes           Access:  Current line(s): PIV and PICC     Genitourinary:   Urinary status: incontinent and external catheter    Respiratory:      Chronic home O2 use?: YES  Incentive spirometer at bedside: YES       GI:     Current diet:  ADULT DIET; Regular  Passing flatus: YES  Tolerating current diet: YES       Pain Management:   Patient states pain is manageable on current regimen: YES    Skin:     Interventions: turn team, float heels, increase time out of bed, PT/OT consult, and internal/external urinary devices    Patient Safety:  Fall Score:    Interventions: bed/chair alarm, assistive device (walker, cane. etc), gripper socks, pt to call before getting OOB, and stay with me (per policy)       Length of Stay:  Expected LOS: 4  Actual LOS: 2      Rosalind Ware LPN

## 2024-04-27 NOTE — PROGRESS NOTES
Bedside shift change report given to KI Boyer (oncoming nurse) by AGNES Osborne (offgoing nurse). Report included the following information Nurse Handoff Report, Index, ED Encounter Summary, ED SBAR, Adult Overview, Surgery Report, Intake/Output, MAR, Recent Results, Med Rec Status, Cardiac Rhythm Paced, Quality Measures, and Neuro Assessment.

## 2024-04-27 NOTE — PROGRESS NOTES
Name: Marilee Oakes   MRN: [id number]  : 1950                                                           Assessment:            HILARY on CKD stage IIIb  Acute kidney injury grade 2 KDIGO classification  Urinary incontinence  ?  Urosepsis-positive for yeast  Chronic anemia  AE of COPD-mild Discussion:      Creatinine trend 2.59 => 2.32>>2.44  UCPR 2.2 grams  KOREY- Only 1 kidney was visualized and the test was stopped patient's noncooperation; CT A/P with no hydro  Known to have baseline CKD stage IIIb A,  lymphedema and type 2 diabetes without complications  Follows with Dr. Evangelista-baseline creatinine 1.3-1.7  ?Urinary retention     Plan:   Patient got gentle IV hydration for last 2 days.  She just came off of IV fluids earlier today.    Check bladder scan.  Lane catheter if residual of greater than 300 mL   On Diflucan  A.m. labs    Discussed with patient and RN      Subjective:  Feels okay.  Dark and concentrated urine earlier per RN.  + SOB with some wheezing    Exam:  /87   Pulse 80   Temp 97.5 °F (36.4 °C) (Oral)   Resp 16   Wt 110 kg (242 lb 8.1 oz)   SpO2 99%   BMI 35.81 kg/m²     WB/WN, NAD  + Bilateral scattered wheezing, no overt distress  Regular rate rhythm  Trace edema  Aox3    Labs/Data:    Lab Results   Component Value Date    WBC 4.3 2024    HGB 8.1 (L) 2024    HCT 28.9 (L) 2024    MCV 89.2 2024     2024       Lab Results   Component Value Date/Time     2024 02:36 AM    K 3.4 2024 02:36 AM     2024 02:36 AM    CO2 24 2024 02:36 AM    BUN 36 2024 02:36 AM    CREATININE 2.44 2024 02:36 AM    GLUCOSE 131 2024 02:36 AM    CALCIUM 8.2 2024 02:36 AM    LABGLOM 21 2024 02:36 AM        Wt Readings from Last 3 Encounters:   24 110 kg (242 lb 8.1

## 2024-04-27 NOTE — PROGRESS NOTES
Hospitalist Progress Note    NAME:   Marilee Oakes   : 1950   MRN: [id number]     Date/Time: 2024 2:19 PM  Patient PCP: No primary care provider on file.    Estimated discharge date:   Barriers: respiratory improvement, final antibiotic plan, may need Orthopedic involvement. Patient and family adamantly do not want patient to go back to [facility]      Assessment / Plan:    Acute on chronic renal failure - improving  CKD 3 with baseline creatinine between 1.2-1.6.  Monitor patient telemetry.  Continue gentle hydration  Hold Bumex.  consulted nephrology for further input.      MRSA wound infection  Recurrent UTI  Left ankle fracture s/p ORIF  Blood cultures taken from [facility] were positive for MRSA per MD at [facility], prompting prior admission. No blood cultures here are positive. Reportedly had rash with Daptomycin and now renal failure with Vancomycin.  Left leg bandaged.  Patient is afebrile, white count is normal, procalcitonin is negative.  - ID consulted, appreciate assistance  - may need Orthopedic involvement, will discuss with ID  - PICC line is present    COPD - mild exacerbation  Wheezing noted  - pred 40  - scheduled duonebs    Toxic metabolic encephalopathy - resolved  Patient has been lethargic.  CT head is negative.  Continue to monitor in telemetry with neurochecks every 4 hours.  Treat underlying cause.  If mentation does not improve, consider neurology consult.     Anemia  Baseline hemoglobin between 8-9.  Currently hemoglobin is 7.5.  Occult blood was negative.  Monitor CBC.  Transfuse if hemoglobin is less than 7.0.     CAD  CHF with ICD  A-fib with Watchman device  Continue with aspirin, Imdur, Coreg.     Diabetes mellitus  Neuropathy  Fingerstick glucose and sliding scale insulin.   Continue with Lyrica.     Anxiety  Continue with Klonopin.  - gave one dose of IV valium for acute anxiety      GERD  Continue with Protonix.         Medical Decision Making:   I  were reviewed prior to creation of Plan.      LABS:  I reviewed today's most current labs and imaging studies.  Pertinent labs include:  Recent Labs     04/25/24  1156 04/26/24  0454 04/26/24  0536   WBC 4.1 PLEASE DISREGARD RESULTS 4.3   HGB 7.5* PLEASE DISREGARD RESULTS 8.1*   HCT 26.5* PLEASE DISREGARD RESULTS 28.9*    PLEASE DISREGARD RESULTS 197       Recent Labs     04/25/24  1156 04/26/24  0330 04/27/24  0236    145 145   K 3.2* 3.5 3.4*   * 114* 115*   CO2 29 27 24   GLUCOSE 115* 101* 131*   BUN 47* 40* 36*   CREATININE 2.59* 2.32* 2.44*   CALCIUM 8.8 8.4* 8.2*   MG 2.2 2.0  --    PHOS  --  5.4*  --    BILITOT 0.6 0.5  --    AST 12* 11*  --    ALT 15 15  --          Signed: David L Mendel, MD

## 2024-04-27 NOTE — PROGRESS NOTES
Rapid Response Note    Respiratory therapy called in reference to patient being more lethargic and needing back to back nebs due to wheezing.  I was asked to look in on patient.  On arrival Patient was lying in bed on O2 2 lpm nc appears she was sleeping easily aroused by voice oriented x4 patient did say she felt short of breath but this has been going on for awhile.  She stated she uses a CPAP at home but did not have it here now.  No Chest pain per patient.  Primary nurse states patient has been lethargic during the day.  Resp rate 24 sat's upper 90's able to move all ext's exp wheezing noted in upper fields.    Spoke with Dr Mendel   orders for Solu-Medrol 125 mg now, ABG, and ok for CPAP    ABG  pH 7.24  PCO2 48  PO2 87 Bicarb 20.8  Dr Mendel notified of results.  Orders to start BIPAP and transfer patient to stepdown.  Repeat ABG 1 hour after start of BIPAP.     Nursing Supervisor advised in reference to transfer and Respiratory notified in reference to BIPAP    Patient will transfer to room 2137.  Primary nurse will take care of transfer and report.      Dennis Gutierrez RN  Rapid Response Team  [phone]

## 2024-04-28 LAB
ANION GAP SERPL CALC-SCNC: 3 MMOL/L (ref 5–15)
BASOPHILS # BLD: 0 K/UL (ref 0–0.1)
BASOPHILS NFR BLD: 0 % (ref 0–1)
BUN SERPL-MCNC: 35 MG/DL (ref 6–20)
BUN/CREAT SERPL: 12 (ref 12–20)
CALCIUM SERPL-MCNC: 8.6 MG/DL (ref 8.5–10.1)
CHLORIDE SERPL-SCNC: 114 MMOL/L (ref 97–108)
CO2 SERPL-SCNC: 26 MMOL/L (ref 21–32)
CREAT SERPL-MCNC: 2.9 MG/DL (ref 0.55–1.02)
DIFFERENTIAL METHOD BLD: ABNORMAL
EOSINOPHIL # BLD: 0 K/UL (ref 0–0.4)
EOSINOPHIL NFR BLD: 0 % (ref 0–7)
ERYTHROCYTE [DISTWIDTH] IN BLOOD BY AUTOMATED COUNT: 19.1 % (ref 11.5–14.5)
FOLATE SERPL-MCNC: 10.6 NG/ML (ref 5–21)
GLUCOSE BLD STRIP.AUTO-MCNC: 213 MG/DL (ref 65–117)
GLUCOSE BLD STRIP.AUTO-MCNC: 240 MG/DL (ref 65–117)
GLUCOSE BLD STRIP.AUTO-MCNC: 274 MG/DL (ref 65–117)
GLUCOSE BLD STRIP.AUTO-MCNC: 405 MG/DL (ref 65–117)
GLUCOSE SERPL-MCNC: 207 MG/DL (ref 65–100)
HCT VFR BLD AUTO: 26.9 % (ref 35–47)
HGB BLD-MCNC: 7.4 G/DL (ref 11.5–16)
IMM GRANULOCYTES # BLD AUTO: 0 K/UL (ref 0–0.04)
IMM GRANULOCYTES NFR BLD AUTO: 1 % (ref 0–0.5)
LYMPHOCYTES # BLD: 0.3 K/UL (ref 0.8–3.5)
LYMPHOCYTES NFR BLD: 12 % (ref 12–49)
MAGNESIUM SERPL-MCNC: 1.9 MG/DL (ref 1.6–2.4)
MCH RBC QN AUTO: 24.7 PG (ref 26–34)
MCHC RBC AUTO-ENTMCNC: 27.5 G/DL (ref 30–36.5)
MCV RBC AUTO: 89.7 FL (ref 80–99)
MONOCYTES # BLD: 0 K/UL (ref 0–1)
MONOCYTES NFR BLD: 1 % (ref 5–13)
NEUTS SEG # BLD: 2.1 K/UL (ref 1.8–8)
NEUTS SEG NFR BLD: 86 % (ref 32–75)
NRBC # BLD: 0 K/UL (ref 0–0.01)
NRBC BLD-RTO: 0 PER 100 WBC
NT PRO BNP: ABNORMAL PG/ML
PHOSPHATE SERPL-MCNC: 5.2 MG/DL (ref 2.6–4.7)
PLATELET # BLD AUTO: 165 K/UL (ref 150–400)
PMV BLD AUTO: 11 FL (ref 8.9–12.9)
POTASSIUM SERPL-SCNC: 4.1 MMOL/L (ref 3.5–5.1)
RBC # BLD AUTO: 3 M/UL (ref 3.8–5.2)
RBC MORPH BLD: ABNORMAL
SERVICE CMNT-IMP: ABNORMAL
SODIUM SERPL-SCNC: 143 MMOL/L (ref 136–145)
T4 FREE SERPL-MCNC: 0.7 NG/DL (ref 0.8–1.5)
TSH SERPL DL<=0.05 MIU/L-ACNC: 0.34 UIU/ML (ref 0.36–3.74)
VIT B12 SERPL-MCNC: 516 PG/ML (ref 193–986)
WBC # BLD AUTO: 2.4 K/UL (ref 3.6–11)

## 2024-04-28 PROCEDURE — 82607 VITAMIN B-12: CPT

## 2024-04-28 PROCEDURE — 6370000000 HC RX 637 (ALT 250 FOR IP): Performed by: INTERNAL MEDICINE

## 2024-04-28 PROCEDURE — 83735 ASSAY OF MAGNESIUM: CPT

## 2024-04-28 PROCEDURE — 6360000002 HC RX W HCPCS: Performed by: INTERNAL MEDICINE

## 2024-04-28 PROCEDURE — 2700000000 HC OXYGEN THERAPY PER DAY

## 2024-04-28 PROCEDURE — 6360000002 HC RX W HCPCS: Performed by: NURSE PRACTITIONER

## 2024-04-28 PROCEDURE — 2060000000 HC ICU INTERMEDIATE R&B

## 2024-04-28 PROCEDURE — 85025 COMPLETE CBC W/AUTO DIFF WBC: CPT

## 2024-04-28 PROCEDURE — 6370000000 HC RX 637 (ALT 250 FOR IP): Performed by: STUDENT IN AN ORGANIZED HEALTH CARE EDUCATION/TRAINING PROGRAM

## 2024-04-28 PROCEDURE — 2580000003 HC RX 258: Performed by: INTERNAL MEDICINE

## 2024-04-28 PROCEDURE — 84439 ASSAY OF FREE THYROXINE: CPT

## 2024-04-28 PROCEDURE — 80048 BASIC METABOLIC PNL TOTAL CA: CPT

## 2024-04-28 PROCEDURE — 84100 ASSAY OF PHOSPHORUS: CPT

## 2024-04-28 PROCEDURE — 82962 GLUCOSE BLOOD TEST: CPT

## 2024-04-28 PROCEDURE — 94640 AIRWAY INHALATION TREATMENT: CPT

## 2024-04-28 PROCEDURE — 6360000002 HC RX W HCPCS: Performed by: STUDENT IN AN ORGANIZED HEALTH CARE EDUCATION/TRAINING PROGRAM

## 2024-04-28 PROCEDURE — 82746 ASSAY OF FOLIC ACID SERUM: CPT

## 2024-04-28 PROCEDURE — 2580000003 HC RX 258: Performed by: STUDENT IN AN ORGANIZED HEALTH CARE EDUCATION/TRAINING PROGRAM

## 2024-04-28 PROCEDURE — 84443 ASSAY THYROID STIM HORMONE: CPT

## 2024-04-28 PROCEDURE — 83880 ASSAY OF NATRIURETIC PEPTIDE: CPT

## 2024-04-28 PROCEDURE — 94660 CPAP INITIATION&MGMT: CPT

## 2024-04-28 PROCEDURE — 36415 COLL VENOUS BLD VENIPUNCTURE: CPT

## 2024-04-28 RX ORDER — GLUCAGON 1 MG/ML
1 KIT INJECTION PRN
Status: DISCONTINUED | OUTPATIENT
Start: 2024-04-28 | End: 2024-05-10 | Stop reason: HOSPADM

## 2024-04-28 RX ORDER — INSULIN LISPRO 100 [IU]/ML
0-8 INJECTION, SOLUTION INTRAVENOUS; SUBCUTANEOUS
Status: DISCONTINUED | OUTPATIENT
Start: 2024-04-28 | End: 2024-05-10 | Stop reason: HOSPADM

## 2024-04-28 RX ORDER — DEXTROSE MONOHYDRATE 100 MG/ML
INJECTION, SOLUTION INTRAVENOUS CONTINUOUS PRN
Status: DISCONTINUED | OUTPATIENT
Start: 2024-04-28 | End: 2024-05-10 | Stop reason: HOSPADM

## 2024-04-28 RX ORDER — INSULIN LISPRO 100 [IU]/ML
0-4 INJECTION, SOLUTION INTRAVENOUS; SUBCUTANEOUS NIGHTLY
Status: DISCONTINUED | OUTPATIENT
Start: 2024-04-28 | End: 2024-05-10 | Stop reason: HOSPADM

## 2024-04-28 RX ADMIN — ASPIRIN 81 MG: 81 TABLET, CHEWABLE ORAL at 09:09

## 2024-04-28 RX ADMIN — PANTOPRAZOLE SODIUM 40 MG: 40 TABLET, DELAYED RELEASE ORAL at 09:12

## 2024-04-28 RX ADMIN — MELATONIN 3 MG: at 21:27

## 2024-04-28 RX ADMIN — ISOSORBIDE MONONITRATE 60 MG: 30 TABLET, EXTENDED RELEASE ORAL at 09:09

## 2024-04-28 RX ADMIN — ARFORMOTEROL TARTRATE: 15 SOLUTION RESPIRATORY (INHALATION) at 07:29

## 2024-04-28 RX ADMIN — FUROSEMIDE 20 MG: 10 INJECTION, SOLUTION INTRAMUSCULAR; INTRAVENOUS at 18:31

## 2024-04-28 RX ADMIN — IPRATROPIUM BROMIDE AND ALBUTEROL SULFATE 1 DOSE: .5; 3 SOLUTION RESPIRATORY (INHALATION) at 07:29

## 2024-04-28 RX ADMIN — Medication 8 UNITS: at 13:14

## 2024-04-28 RX ADMIN — WATER 60 MG: 1 INJECTION INTRAMUSCULAR; INTRAVENOUS; SUBCUTANEOUS at 22:38

## 2024-04-28 RX ADMIN — ENOXAPARIN SODIUM 30 MG: 100 INJECTION SUBCUTANEOUS at 09:08

## 2024-04-28 RX ADMIN — FUROSEMIDE 20 MG: 10 INJECTION, SOLUTION INTRAMUSCULAR; INTRAVENOUS at 09:08

## 2024-04-28 RX ADMIN — INSULIN LISPRO 1 UNITS: 100 INJECTION, SOLUTION INTRAVENOUS; SUBCUTANEOUS at 09:08

## 2024-04-28 RX ADMIN — PREGABALIN 150 MG: 75 CAPSULE ORAL at 21:27

## 2024-04-28 RX ADMIN — WATER 60 MG: 1 INJECTION INTRAMUSCULAR; INTRAVENOUS; SUBCUTANEOUS at 06:03

## 2024-04-28 RX ADMIN — CARVEDILOL 25 MG: 12.5 TABLET, FILM COATED ORAL at 18:31

## 2024-04-28 RX ADMIN — CEFTAROLINE FOSAMIL 300 MG: 600 POWDER, FOR SOLUTION INTRAVENOUS at 13:24

## 2024-04-28 RX ADMIN — IPRATROPIUM BROMIDE AND ALBUTEROL SULFATE 1 DOSE: .5; 3 SOLUTION RESPIRATORY (INHALATION) at 20:21

## 2024-04-28 RX ADMIN — SODIUM CHLORIDE, PRESERVATIVE FREE 10 ML: 5 INJECTION INTRAVENOUS at 09:13

## 2024-04-28 RX ADMIN — ARFORMOTEROL TARTRATE: 15 SOLUTION RESPIRATORY (INHALATION) at 20:21

## 2024-04-28 RX ADMIN — CEFTAROLINE FOSAMIL 300 MG: 600 POWDER, FOR SOLUTION INTRAVENOUS at 06:01

## 2024-04-28 RX ADMIN — CARVEDILOL 25 MG: 12.5 TABLET, FILM COATED ORAL at 09:09

## 2024-04-28 RX ADMIN — WATER 60 MG: 1 INJECTION INTRAMUSCULAR; INTRAVENOUS; SUBCUTANEOUS at 18:29

## 2024-04-28 RX ADMIN — PREGABALIN 150 MG: 75 CAPSULE ORAL at 09:19

## 2024-04-28 RX ADMIN — Medication 4 UNITS: at 18:33

## 2024-04-28 RX ADMIN — SODIUM CHLORIDE, PRESERVATIVE FREE 10 ML: 5 INJECTION INTRAVENOUS at 21:28

## 2024-04-28 RX ADMIN — WATER 60 MG: 1 INJECTION INTRAMUSCULAR; INTRAVENOUS; SUBCUTANEOUS at 09:09

## 2024-04-28 RX ADMIN — CLONAZEPAM 0.5 MG: 0.5 TABLET ORAL at 21:27

## 2024-04-28 RX ADMIN — FLUCONAZOLE 200 MG: 200 TABLET ORAL at 09:09

## 2024-04-28 RX ADMIN — SODIUM CHLORIDE, PRESERVATIVE FREE 10 ML: 5 INJECTION INTRAVENOUS at 01:30

## 2024-04-28 RX ADMIN — CEFTAROLINE FOSAMIL 300 MG: 600 POWDER, FOR SOLUTION INTRAVENOUS at 22:31

## 2024-04-28 RX ADMIN — ACETAMINOPHEN 650 MG: 325 TABLET ORAL at 22:52

## 2024-04-28 ASSESSMENT — PAIN DESCRIPTION - LOCATION: LOCATION: HEAD

## 2024-04-28 ASSESSMENT — PAIN SCALES - GENERAL
PAINLEVEL_OUTOF10: 0

## 2024-04-28 NOTE — PROGRESS NOTES
Name: Marilee Oakes   MRN: [id number]  : 1950                                                           Assessment:            HILARY on CKD stage IIIb  Acute kidney injury grade 2 KDIGO classification  Urinary incontinence  ?  Urosepsis-positive for yeast; recurrent UTI  Chronic anemia  AE of COPD-mild  CHF with ICD  Afib with watchman device  L ankle fracture- s/p ORIF  MRSA wound infection- Bcx at [facility] for MRSA PTA Discussion:      Creatinine trend 2.59 => 2.32>>2.44>>2.9 (suspect ATN from Bacteremia/UTI/Afib + CRS)    UCPR 2.2 grams  CT A/P with no hydro  Known to have baseline CKD stage IIIb ,  lymphedema and type 2 diabetes without complications  Follows with Dr. Evangelista-baseline creatinine 1.3-1.7  No high residual on bladder scan  Got IVF for 2 days and then started IV Lasix on        Plan:   Ct IV lasix 20 mg BID to allow permissive rise in Cr  On Diflucan  IV Ceftaroline per 1ry team  Monitor UOP  A.m. labs    Discussed with patient, Dr Mendel      Subjective:  Still feels sob, but slight better    Exam:  /68   Pulse 80   Temp 98.1 °F (36.7 °C) (Oral)   Resp 12   Wt 110 kg (242 lb 8.1 oz)   SpO2 93%   BMI 35.81 kg/m²     WB/WN, NAD  + Bilateral  wheezing, no overt distress  Irregular rhythm, distant  Trace edema  Aox3    Labs/Data:    Lab Results   Component Value Date    WBC 2.4 (L) 2024    HGB 7.4 (L) 2024    HCT 26.9 (L) 2024    MCV 89.7 2024     2024       Lab Results   Component Value Date/Time     2024 03:49 AM    K 4.1 2024 03:49 AM     2024 03:49 AM    CO2 26 2024 03:49 AM    BUN 35 2024 03:49 AM    CREATININE 2.90 2024 03:49 AM    GLUCOSE 207 2024 03:49 AM    CALCIUM 8.6 2024 03:49 AM    LABGLOM 17 2024 03:49 AM        Wt Readings

## 2024-04-28 NOTE — PROGRESS NOTES
Hospitalist Progress Note    NAME:   Marilee Oakes   : 1950   MRN: [id number]     Date/Time: 2024 12:36 PM  Patient PCP: No primary care provider on file.    Estimated discharge date:   Barriers: renal improvement, diuresis, respiratory improvement, mentation improvement, final antibiotic plan, may need Orthopedic involvement. Patient and family adamantly do not want patient to go back to [facility]      Assessment / Plan:    CHF with ICD - mild exacerbation  CAD  A-fib with Watchman device  Continue with aspirin, Imdur, Coreg.  - BNP elevated, coarse lung sounds  - lasix added  - cpap overnight    Acute on chronic renal failure   CKD 3 with baseline creatinine between 1.2-1.6.  Monitor patient telemetry.  Continue gentle hydration  Hold Bumex.  - lasix as above  Nephrology following    COPD - exacerbation  Wheezing noted  - prednisone changed to methylpred given worsening  evening  - scheduled duonebs    MRSA wound infection  Recurrent UTI  Left ankle fracture s/p ORIF  Blood cultures taken from [facility] were positive for MRSA per MD at [facility], prompting prior admission. No blood cultures here are positive. Reportedly had rash with Daptomycin and now renal failure with Vancomycin.  Left leg bandaged.  Patient is afebrile, white count is normal, procalcitonin is negative.  - ID consulted, appreciate assistance  - may need Orthopedic involvement, will discuss with ID  - PICC line is present    Toxic metabolic encephalopathy - intermittent  Patient has been lethargic.  CT head is negative.  Continue to monitor in telemetry with neurochecks every 4 hours.  Treat underlying cause.  - waxing and waning mental status, suspect delirium along with mild hypercarbia  If mentation does not improve, consider neurology consult.     Anemia  Baseline hemoglobin between 8-9.  Currently hemoglobin is 7.5.  Occult blood was negative.  Monitor CBC.  Transfuse if hemoglobin is less than 7.0.      Diabetes  mellitus  Neuropathy  Fingerstick glucose and sliding scale insulin.   Continue with Lyrica.     Anxiety  Continue with Klonopin.  - gave one dose of IV valium for acute anxiety 4/27     GERD  Continue with Protonix.         Medical Decision Making:   I personally reviewed labs: CBC, BMP  I personally reviewed imaging:   I personally reviewed EKG:   Toxic drug monitoring: H&H while patient is on Lovenox  Discussed case with:  RNMELVIN        Code Status: Full code  DVT Prophylaxis: Lovenox  Baseline: Unknown baseline, likely debilitated, coming from nursing home/[facility]      Subjective:     Chief Complaint / Reason for Physician Visit  \"Followup HILARY\".  Discussed with RN events overnight. More confused this morning.      Objective:     VITALS:   Last 24hrs VS reviewed since prior progress note. Most recent are:  Patient Vitals for the past 24 hrs:   BP Temp Temp src Pulse Resp SpO2   04/28/24 0715 (!) 144/94 97.7 °F (36.5 °C) Oral 80 25 --   04/28/24 0329 -- -- -- -- -- 93 %   04/27/24 2330 (!) 154/83 97.9 °F (36.6 °C) Axillary 80 24 90 %   04/27/24 2309 -- -- -- -- -- 100 %   04/27/24 2033 -- -- -- -- -- 100 %   04/27/24 2032 (!) 154/83 97.5 °F (36.4 °C) Oral 80 19 --   04/27/24 1855 139/87 -- -- 80 -- --   04/27/24 1650 -- -- -- 80 -- --   04/27/24 1558 139/87 97.5 °F (36.4 °C) Oral 80 16 99 %           Intake/Output Summary (Last 24 hours) at 4/28/2024 1236  Last data filed at 4/27/2024 1820  Gross per 24 hour   Intake --   Output 300 ml   Net -300 ml          I had a face to face encounter and independently examined this patient on 4/28/2024, as outlined below:  PHYSICAL EXAM:  General: Alert, cooperative  EENT:  EOMI. Anicteric sclerae.  Resp:  CTA bilaterally, no wheezing or rales.  No accessory muscle use  CV:  Regular  rate,  No edema  GI:  Soft, Non distended, Non tender.  +Bowel sounds  Neurologic:  Alert and oriented X 2, normal speech,   Skin:  No rashes.  No jaundice    Reviewed most current lab

## 2024-04-28 NOTE — PROGRESS NOTES
Dayshift Dr Mendel contacted Nocturnist/cross cover provider and notified patient is due for transfer to SDU from /s and being placed on bipap as was a RRT today, states 1 hr s/p start bipap to do ABG repeat already ordered. No other concerns reported. No acute distress reported. VSS as per last documented. Seen pt briefly on the way to her room at SDU, pt has some mild belly breathing, appears a little winded, but fairly stable. Presently only able to listen to the lungs sounds on the front of chest as pt is enroute to her new room, will plan to come back by and listen to posterior, nurse called RT so they will bring bipap to initiate as was not started prior to transfer to SDU, nurse taking over pt will remind RT to do a abg 1 hour after starting bipap, and will also please notify me of results as I d/w her. Plan to follow-up on abg s/p bipap initiation after nurse msg me. Pt denies dyspnea on exam, she is alert/awake. She comes up to the SDU on o2 via NC. Will defer further evaluation/management to the day shift primary attending care team. Patient denies any further complaints or concerns. Nursing to notify Hospitalist for further/continued concerns. Will remain available overnight for further concerns if nursing/patient needs.     Update  2046 nurse reported no order for bipap had been placed. States RT at bedside and reported pt already on prn nebs, RT states they feel the slight amount of CO2 pt is up on abg is not necessary for bipap per nurse reported, I did place HS bipap as per notes pt is on cpap at home, and may need some ventilatory support as is also, albeit mild, having some belly breathing. Abg showed mild respiratory acidosis. Stat cxr ordered- results pending. No other concerns reported. Stable at this time. I was able to listen to the lung sounds with the nurse and nurse reported pt not on any diuresis. Pt has course lungs rales throughout. Having the bipap may also help force some of this fluid  out of the lungs, had MIVF earlier today- this is presently off as order was completed. Has h/o ckd and chf. At this time will add 40mg iv lasix x1. Will also add lasix 20mg iv bid scheduled- with HILARY will at this time defer further dose adjustment decision to dayshift attending team, also placed nephrology consult as now with hilary on ckd and chf h/o- requiring diuresis.     Latest Reference Range & Units 04/27/24 18:48   POC pH 7.35 - 7.45   7.24 (LL)   POC pCO2 35.0 - 45.0 MMHG 48.0 (H)   POC PO2 80 - 100 MMHG 87   POC HCO3 22 - 26 MMOL/L 20.8 (L)   POC O2 SAT 92 - 97 % 94.7   POC Stan's Test -   Positive   FIO2 % 2   (LL): Data is critically low  (H): Data is abnormally high  (L): Data is abnormally low    Update  2337 nurse reported RT placed on cpap 60/20/RA instead of bipap. Nurse reported she d/w RT with bipap with crackles and as above, was placed on cpap by RT. Nurse reported no other concerns presently.    Non-billable note.

## 2024-04-29 ENCOUNTER — APPOINTMENT (OUTPATIENT)
Facility: HOSPITAL | Age: 74
End: 2024-04-29
Payer: MEDICARE

## 2024-04-29 LAB
ANION GAP SERPL CALC-SCNC: 7 MMOL/L (ref 5–15)
BASOPHILS # BLD: 0 K/UL (ref 0–0.1)
BASOPHILS NFR BLD: 0 % (ref 0–1)
BUN SERPL-MCNC: 46 MG/DL (ref 6–20)
BUN/CREAT SERPL: 12 (ref 12–20)
CALCIUM SERPL-MCNC: 8.6 MG/DL (ref 8.5–10.1)
CHLORIDE SERPL-SCNC: 113 MMOL/L (ref 97–108)
CO2 SERPL-SCNC: 23 MMOL/L (ref 21–32)
CREAT SERPL-MCNC: 3.7 MG/DL (ref 0.55–1.02)
DIFFERENTIAL METHOD BLD: ABNORMAL
EOSINOPHIL # BLD: 0 K/UL (ref 0–0.4)
EOSINOPHIL NFR BLD: 0 % (ref 0–7)
ERYTHROCYTE [DISTWIDTH] IN BLOOD BY AUTOMATED COUNT: 19 % (ref 11.5–14.5)
GLUCOSE BLD STRIP.AUTO-MCNC: 225 MG/DL (ref 65–117)
GLUCOSE BLD STRIP.AUTO-MCNC: 259 MG/DL (ref 65–117)
GLUCOSE BLD STRIP.AUTO-MCNC: 272 MG/DL (ref 65–117)
GLUCOSE BLD STRIP.AUTO-MCNC: 351 MG/DL (ref 65–117)
GLUCOSE SERPL-MCNC: 232 MG/DL (ref 65–100)
HCT VFR BLD AUTO: 30 % (ref 35–47)
HGB BLD-MCNC: 8.2 G/DL (ref 11.5–16)
IMM GRANULOCYTES # BLD AUTO: 0 K/UL (ref 0–0.04)
IMM GRANULOCYTES NFR BLD AUTO: 0 % (ref 0–0.5)
LYMPHOCYTES # BLD: 0.2 K/UL (ref 0.8–3.5)
LYMPHOCYTES NFR BLD: 5 % (ref 12–49)
MAGNESIUM SERPL-MCNC: 1.8 MG/DL (ref 1.6–2.4)
MCH RBC QN AUTO: 24.8 PG (ref 26–34)
MCHC RBC AUTO-ENTMCNC: 27.3 G/DL (ref 30–36.5)
MCV RBC AUTO: 90.6 FL (ref 80–99)
MONOCYTES # BLD: 0 K/UL (ref 0–1)
MONOCYTES NFR BLD: 0 % (ref 5–13)
NEUTS SEG # BLD: 4.1 K/UL (ref 1.8–8)
NEUTS SEG NFR BLD: 95 % (ref 32–75)
NRBC # BLD: 0 K/UL (ref 0–0.01)
NRBC BLD-RTO: 0 PER 100 WBC
PHOSPHATE SERPL-MCNC: 7 MG/DL (ref 2.6–4.7)
PLATELET # BLD AUTO: 190 K/UL (ref 150–400)
PMV BLD AUTO: 11.6 FL (ref 8.9–12.9)
POTASSIUM SERPL-SCNC: 4.1 MMOL/L (ref 3.5–5.1)
RBC # BLD AUTO: 3.31 M/UL (ref 3.8–5.2)
RBC MORPH BLD: ABNORMAL
SERVICE CMNT-IMP: ABNORMAL
SODIUM SERPL-SCNC: 143 MMOL/L (ref 136–145)
WBC # BLD AUTO: 4.3 K/UL (ref 3.6–11)

## 2024-04-29 PROCEDURE — 83735 ASSAY OF MAGNESIUM: CPT

## 2024-04-29 PROCEDURE — 97535 SELF CARE MNGMENT TRAINING: CPT | Performed by: OCCUPATIONAL THERAPIST

## 2024-04-29 PROCEDURE — 85025 COMPLETE CBC W/AUTO DIFF WBC: CPT

## 2024-04-29 PROCEDURE — 6360000002 HC RX W HCPCS: Performed by: INTERNAL MEDICINE

## 2024-04-29 PROCEDURE — 82962 GLUCOSE BLOOD TEST: CPT

## 2024-04-29 PROCEDURE — 2580000003 HC RX 258: Performed by: STUDENT IN AN ORGANIZED HEALTH CARE EDUCATION/TRAINING PROGRAM

## 2024-04-29 PROCEDURE — 97110 THERAPEUTIC EXERCISES: CPT

## 2024-04-29 PROCEDURE — 6370000000 HC RX 637 (ALT 250 FOR IP): Performed by: STUDENT IN AN ORGANIZED HEALTH CARE EDUCATION/TRAINING PROGRAM

## 2024-04-29 PROCEDURE — 2700000000 HC OXYGEN THERAPY PER DAY

## 2024-04-29 PROCEDURE — 6360000002 HC RX W HCPCS: Performed by: NURSE PRACTITIONER

## 2024-04-29 PROCEDURE — 2060000000 HC ICU INTERMEDIATE R&B

## 2024-04-29 PROCEDURE — 6370000000 HC RX 637 (ALT 250 FOR IP): Performed by: INTERNAL MEDICINE

## 2024-04-29 PROCEDURE — 94640 AIRWAY INHALATION TREATMENT: CPT

## 2024-04-29 PROCEDURE — 97530 THERAPEUTIC ACTIVITIES: CPT | Performed by: OCCUPATIONAL THERAPIST

## 2024-04-29 PROCEDURE — 94660 CPAP INITIATION&MGMT: CPT

## 2024-04-29 PROCEDURE — 6360000002 HC RX W HCPCS: Performed by: STUDENT IN AN ORGANIZED HEALTH CARE EDUCATION/TRAINING PROGRAM

## 2024-04-29 PROCEDURE — 84100 ASSAY OF PHOSPHORUS: CPT

## 2024-04-29 PROCEDURE — 99233 SBSQ HOSP IP/OBS HIGH 50: CPT | Performed by: INTERNAL MEDICINE

## 2024-04-29 PROCEDURE — 36415 COLL VENOUS BLD VENIPUNCTURE: CPT

## 2024-04-29 PROCEDURE — 97530 THERAPEUTIC ACTIVITIES: CPT

## 2024-04-29 PROCEDURE — 80048 BASIC METABOLIC PNL TOTAL CA: CPT

## 2024-04-29 PROCEDURE — 73610 X-RAY EXAM OF ANKLE: CPT

## 2024-04-29 PROCEDURE — 2580000003 HC RX 258: Performed by: INTERNAL MEDICINE

## 2024-04-29 PROCEDURE — APPNB45 APP NON BILLABLE 31-45 MINUTES: Performed by: ORTHOPAEDIC SURGERY

## 2024-04-29 RX ORDER — PREGABALIN 75 MG/1
150 CAPSULE ORAL NIGHTLY
Status: DISCONTINUED | OUTPATIENT
Start: 2024-04-29 | End: 2024-05-10 | Stop reason: HOSPADM

## 2024-04-29 RX ADMIN — ASPIRIN 81 MG: 81 TABLET, CHEWABLE ORAL at 09:33

## 2024-04-29 RX ADMIN — CEFTAROLINE FOSAMIL 300 MG: 600 POWDER, FOR SOLUTION INTRAVENOUS at 05:28

## 2024-04-29 RX ADMIN — CARVEDILOL 25 MG: 12.5 TABLET, FILM COATED ORAL at 09:33

## 2024-04-29 RX ADMIN — ISOSORBIDE MONONITRATE 60 MG: 30 TABLET, EXTENDED RELEASE ORAL at 09:32

## 2024-04-29 RX ADMIN — CEFTAROLINE FOSAMIL 300 MG: 600 POWDER, FOR SOLUTION INTRAVENOUS at 22:49

## 2024-04-29 RX ADMIN — ENOXAPARIN SODIUM 30 MG: 100 INJECTION SUBCUTANEOUS at 09:32

## 2024-04-29 RX ADMIN — WATER 60 MG: 1 INJECTION INTRAMUSCULAR; INTRAVENOUS; SUBCUTANEOUS at 12:20

## 2024-04-29 RX ADMIN — CARVEDILOL 25 MG: 12.5 TABLET, FILM COATED ORAL at 18:06

## 2024-04-29 RX ADMIN — FUROSEMIDE 20 MG: 10 INJECTION, SOLUTION INTRAMUSCULAR; INTRAVENOUS at 09:35

## 2024-04-29 RX ADMIN — Medication 2 UNITS: at 09:31

## 2024-04-29 RX ADMIN — CEFTAROLINE FOSAMIL 300 MG: 600 POWDER, FOR SOLUTION INTRAVENOUS at 13:24

## 2024-04-29 RX ADMIN — IPRATROPIUM BROMIDE AND ALBUTEROL SULFATE 1 DOSE: .5; 3 SOLUTION RESPIRATORY (INHALATION) at 08:51

## 2024-04-29 RX ADMIN — SODIUM CHLORIDE: 9 INJECTION, SOLUTION INTRAVENOUS at 22:45

## 2024-04-29 RX ADMIN — ARFORMOTEROL TARTRATE: 15 SOLUTION RESPIRATORY (INHALATION) at 08:51

## 2024-04-29 RX ADMIN — SODIUM CHLORIDE, PRESERVATIVE FREE 10 ML: 5 INJECTION INTRAVENOUS at 09:42

## 2024-04-29 RX ADMIN — Medication 8 UNITS: at 12:19

## 2024-04-29 RX ADMIN — ARFORMOTEROL TARTRATE: 15 SOLUTION RESPIRATORY (INHALATION) at 19:35

## 2024-04-29 RX ADMIN — CLONAZEPAM 0.5 MG: 0.5 TABLET ORAL at 21:04

## 2024-04-29 RX ADMIN — WATER 60 MG: 1 INJECTION INTRAMUSCULAR; INTRAVENOUS; SUBCUTANEOUS at 21:37

## 2024-04-29 RX ADMIN — PREGABALIN 150 MG: 75 CAPSULE ORAL at 09:33

## 2024-04-29 RX ADMIN — SODIUM CHLORIDE, PRESERVATIVE FREE 10 ML: 5 INJECTION INTRAVENOUS at 21:39

## 2024-04-29 RX ADMIN — PANTOPRAZOLE SODIUM 40 MG: 40 TABLET, DELAYED RELEASE ORAL at 05:29

## 2024-04-29 RX ADMIN — Medication 4 UNITS: at 18:07

## 2024-04-29 RX ADMIN — WATER 60 MG: 1 INJECTION INTRAMUSCULAR; INTRAVENOUS; SUBCUTANEOUS at 05:25

## 2024-04-29 RX ADMIN — IPRATROPIUM BROMIDE AND ALBUTEROL SULFATE 1 DOSE: .5; 3 SOLUTION RESPIRATORY (INHALATION) at 19:35

## 2024-04-29 ASSESSMENT — PAIN SCALES - GENERAL
PAINLEVEL_OUTOF10: 0

## 2024-04-29 NOTE — CONSULTS
Orthopedic consult note:    73-year-old female s/p left ankle ORIF by Dr. Farnsworth on 3/23.  Patient subsequently developed MRSA bacteremia as well as concern for infection of her surgical incision.  Our services were consulted today to evaluate the patient status post left ankle ORIF to evaluate her wound.  Patient denies any new or increased pain in the left ankle.  She states that her dressings have not been changed for several days.  She denies any loss of function in the ankle.  She denies any sensation changes.  She denies any new complaints at this time.    Exam: Inspection of the left ankle reveals moderate swelling diffusely throughout the left lower extremity.  Inspections of the incisions medial and laterally shows a well-healed medial incision.  The lateral incision also appears to be healing at this time with some mild wound dehiscence.  No erythema or drainage noted upon palpation.  Very scant amount of drainage noted on the dressings of the lateral surgical site.  Patient is performing some active range of motion of the left ankle as well as active range of motion of all digits.  Sensation light touch intact throughout the left lower extremity.  Capillary fill less than 3 seconds all digits.    Plan:  -Surgical site appears to be much improved compared to previous exams.  No active drainage today or erythema on exam.  -No plans for surgical intervention at this time.  -Will plan to do dressing change tomorrow as well as removal of sutures.  -Continue nonweightbearing in cam boot when up with physical therapy.  -Radiographs ordered of the left ankle still pending.    Dr. Farnsworth aware and agrees.  NIKKI Stallings

## 2024-04-29 NOTE — PROGRESS NOTES
End of Shift Note    Bedside shift change report given to AGNES ZULUAGA (oncoming nurse) by Vale Pa RN (offgoing nurse).  Report included the following information SBAR, Kardex, OR Summary, Procedure Summary, Intake/Output, MAR, Recent Results, Med Rec Status, Cardiac Rhythm V PACED, Alarm Parameters , and Quality Measures    Shift worked:       Shift summary and any significant changes:     NO major events last night; some confusion at night     Concerns for physician to address:       Zone phone for oncoming shift:          Activity:     Number times ambulated in hallways past shift: 0  Number of times OOB to chair past shift: 0    Cardiac:   Cardiac Monitoring: Yes           Access:  Current line(s): PICC     Genitourinary:   Urinary status: incontinent    Respiratory:      Chronic home O2 use?: YES  Incentive spirometer at bedside: YES       GI:     Current diet:  ADULT DIET; Regular  Passing flatus: YES  Tolerating current diet: YES       Pain Management:   Patient states pain is manageable on current regimen: YES    Skin:     Interventions: turn team, PT/OT consult, limit briefs, and internal/external urinary devices    Patient Safety:  Fall Score:    Interventions: bed/chair alarm, assistive device (walker, cane. etc), gripper socks, and pt to call before getting OOB       Length of Stay:  Expected LOS: 4  Actual LOS: 4      Vale Pa RN

## 2024-04-29 NOTE — PLAN OF CARE
Problem: Physical Therapy - Adult  Goal: By Discharge: Performs mobility at highest level of function for planned discharge setting.  See evaluation for individualized goals.  Description: FUNCTIONAL STATUS PRIOR TO ADMISSION: The patient is a questionable historian. Presenting from [facility]. Multiple recent hospitalizations.  At [facility] for rehabilitation s/p fall with L ankle fx ORIF done on 3/23/24. pt is NWB and was working on scooting, lateral transfers, and standing at rehab in parallel bars to this point (per patient report).    HOME SUPPORT PRIOR TO ADMISSION: The patient lived with spouse. Admitted from [facility].    Physical Therapy Goals  Initiated 4/26/2024  1.  Patient will move from supine to sit and sit to supine, scoot up and down, and roll side to side in bed with modified independence within 7 day(s).    2.  Patient will perform sit to stand with moderate assistance within 7 day(s).  3.  Patient will transfer from bed to chair and chair to bed with contact guard assist using the least restrictive device within 7 day(s).  Outcome: Progressing   PHYSICAL THERAPY TREATMENT    Patient: Marilee Oakes (73 y.o. female)  Date: 4/29/2024  Diagnosis: HILARY (acute kidney injury) ([facility]) [N17.9]  Anemia, unspecified type [D64.9] HILARY (acute kidney injury) ([facility])      Precautions: Fall Risk                      ASSESSMENT:  Patient continues to benefit from skilled PT services and is slowly progressing towards goals. The patient reports staying in bed all weekend. Reviewed exercises the patient can do in bed to improve strength and limited deconditioning with good tolerance and fair performance. Came to sitting with min a x 2 with good/fair sitting balance. Had trouble donning socks due to decreased sitting balance. Had planned on doing bed to chair transfer with drop arm, but the chair drop arm mechanism was broken and stuck in up position. Worked instead on side scooting alone edge of bed while keeping NWB on LLE.  Needed min a x 2 after about 10 minutes of teaching and coaching. Returned to supine with mod/min a x 2 and positioned in chair position. All needs met and resting comfortably when the session ended.       PLAN:  Patient continues to benefit from skilled intervention to address the above impairments.  Continue treatment per established plan of care.    Recommend with staff: bed to chair transfers with Rylan Lift only    Recommend next PT session: bring sliding board to bed to chair transfer training.    Recommendation for discharge: (in order for the patient to meet his/her long term goals): Therapy 3 hours/day 5-7 days/week    Other factors to consider for discharge: patient's current support system is unable to meet their requirements for physical assistance, high risk for falls, concern for safely navigating or managing the home environment, and new weight bearing restrictions limiting activity or patient is unable to maintain    IF patient discharges home will need the following DME: patient owns DME required for discharge and continuing to assess with progress       SUBJECTIVE:   Patient stated, \" I was in bed all weekend. \"    OBJECTIVE DATA SUMMARY:   Critical Behavior:  Orientation  Orientation Level: Oriented to place;Oriented to person;Oriented to situation  Cognition  Overall Cognitive Status: Exceptions  Following Commands: Follows multistep commands with repitition  Attention Span: Appears intact  Insights: Decreased awareness of deficits  Initiation: Does not require cues  Sequencing: Requires cues for some    Functional Mobility Training:  Bed Mobility:  Bed Mobility Training  Bed Mobility Training: Yes  Interventions: Verbal cues;Tactile cues;Weight shifting training/pressure relief;Safety awareness training;Visual cues  Rolling: Contact-guard assistance  Supine to Sit: Minimum assistance;Assist X2  Sit to Supine: Minimum assistance;Moderate assistance;Assist X2  Scooting: Minimum assistance;Assist

## 2024-04-29 NOTE — PLAN OF CARE
Problem: Occupational Therapy - Adult  Goal: By Discharge: Performs self-care activities at highest level of function for planned discharge setting.  See evaluation for individualized goals.  Description: FUNCTIONAL STATUS PRIOR TO ADMISSION:  Patient is a questionable historian, admitted from [facility] rehab. Note recent hospitalization and L ankle ORIF (3/23/24), discharging to [facility] with NWB LLE precautions. Prior to initial injury, patient was modified independent with ADLs and functional mobility using rollator.     Receives Help From: Family, ADL Assistance: Needs assistance, Homemaking Assistance: Needs assistance, Ambulation Assistance: Needs assistance, Active : No     HOME SUPPORT: Patient lived with supportive spouse, however admitted from [facility].    Occupational Therapy Goals:  Initiated 4/26/2024  1.  Patient will perform adhere to NWB LLE throughout functional activity/mobility with minimal verbal cues within 7 day(s).  2.  Patient will perform upper body dressing and bathing with Supervision within 7 day(s).  3.  Patient will perform seated lower body bathing using lateral weight-shifting technique with Minimal Assist within 7 day(s).  4.  Patient will perform lateral toilet transfers to bariatric drop-arm [facility] with Supervision / Set-up within 7 day(s).  5.  Patient will perform all aspects of seated toileting with Minimal Assist within 7 day(s).  6.  Patient will participate in upper extremity therapeutic exercise/activities with Supervision for 10 minutes within 7 day(s).    7.  Patient will utilize energy conservation techniques during functional activities with verbal cues within 7 day(s).  4/29/2024 1747 by Tom Vargas, OTR/L  Outcome: Not Progressing    OCCUPATIONAL THERAPY TREATMENT  Patient: Marilee Oakes (73 y.o. female)  Date: 4/29/2024  Primary Diagnosis: HILARY (acute kidney injury) ([facility]) [N17.9]  Anemia, unspecified type [D64.9]       Precautions: Fall Risk                Chart,

## 2024-04-29 NOTE — PROGRESS NOTES
Infectious Disease Progress        Impression    HILARY on CKD3 worsening  Cr 3.70, previously 2.59  Nephrology following.    Acute metabolic encephalopathy  Resolved  Negative CT head.      MRSA bacteremia  Blood cultures + for MRSA at SNF  Details unavailable at this time  D/w SNF MD today, she confirms  Blood cultures 4/3-no growth.  JUAN MANUEL negative.  Negative Blood cultures this admission.  D/c on Daptomycin  S/p macular rash with Daptomycin.  Changed to Vancomycin on 4/22.     Bimalleolar left ankle fracture  S/p ORIF L/ankle fracture 3/23  Swelling + & serosanguinous drainage at surgical site  S/p incisional wound VAC placement 4/4.  Wound culture 4/3+ for MRSA  Culture reported from drainage at surgical site.    Yeast + in UA  Fuconazole po x 3 days.       CAD  Acute on chronic systolic CHF  A.fib  Watchmen present.  Retained RV pacing lead+.         Diabetes Mellitus 2   On SSI   A1c 5.8        COPD  LEELA on CPAP      Obesity  BMI 35.81      Plan  Ceftaroline 300  mg  IV Q12 planned end date 5/15  Pharmacy to adjust to Cr clearance please  Pt cannot use Daptomycin, worsening Cr with Vancomycin use  D/w pt that Ceftaroline iv is not  standard of care for MRSA  bacteremia.  MRSA decolonization  Ortho evaluation of wound infection  ID service to follow    Extensive review of chart notes, labs, imaging, cultures done  Additionally review of done: Recent reports-Labs, cultures, imaging  D/w -hospitalist, AGNES FRIED Violette is a 73 y.o. female with past medical history significant for MRSA bacteremia, ORIF of left ankle fracture, wound drainage at surgical site  & wound culture positive for MRSA.  Patient is well-known to ID service.  She was treated for MRSA bacteremia and surgical site infection during her recent admission 4/3 -4/12.  Patient also has history of CKD 3, COPD, LEELA, CAD, diabetes type 2, hypertension.  She was discharged on daptomycin IV.  ID service was notified of worsening rash secondary to daptomycin

## 2024-04-29 NOTE — PROGRESS NOTES
Hospitalist Progress Note    NAME:   Marilee Oakes   : 1950   MRN: [id number]     Date/Time: 2024 1:45 PM  Patient PCP: No primary care provider on file.    Estimated discharge date:   Barriers: renal improvement,  mentation stability, final antibiotic plan, Patient and family adamantly do not want patient to go back to [facility]      Assessment / Plan:    CHF with ICD - mild exacerbation, imrpoving  CAD  A-fib with Watchman device  Continue with aspirin, Imdur, Coreg.  - BNP elevated, coarse lung sounds  - cpap overnight  - holding lasix with renal function declining    Acute on chronic renal failure   CKD 3 with baseline creatinine between 1.2-1.6.  Monitor patient telemetry.  Hold Bumex.  - lasix as above  Nephrology following    COPD - exacerbation  Chronic respiratory failure - 2L at baseline  Wheezing noted  - prednisone changed to methylpred given worsening  evening  - scheduled duonebs  - weaning steroids  - back to 2L which is her basline    MRSA wound infection  Recurrent UTI  Left ankle fracture s/p ORIF  Blood cultures taken from [facility] were positive for MRSA per MD at [facility], prompting prior admission. No blood cultures here are positive. Reportedly had rash with Daptomycin and now renal failure with Vancomycin.  Left leg bandaged.  Patient is afebrile, white count is normal, procalcitonin is negative.  - ID consulted, appreciate assistance  - PICC line is present  - Ortho consulted, appreciate assistance    Toxic metabolic encephalopathy - improving  Patient has been lethargic.  CT head is negative.  Continue to monitor in telemetry with neurochecks every 4 hours.  Treat underlying cause.  - waxing and waning mental status, suspect delirium along with mild hypercarbia  - changed lyrica from bid to qhs, may be contributing    Possible euthyroid sick syndrome  - low TSH and t4 likely due to infection  - repeat with PCP in 1 month     Anemia  Baseline hemoglobin between  8-9.  Currently hemoglobin is 7.5.  Occult blood was negative.  Monitor CBC.  Transfuse if hemoglobin is less than 7.0.      Diabetes mellitus  Neuropathy  Fingerstick glucose and sliding scale insulin.   Continue with Lyrica.     Anxiety  Continue with Klonopin.  - gave one dose of IV valium for acute anxiety 4/27     GERD  Continue with Protonix.         Medical Decision Making:   I personally reviewed labs: CBC, BMP  I personally reviewed imaging:   I personally reviewed EKG:   Toxic drug monitoring: H&H while patient is on Lovenox  Discussed case with:  RN, CM, ID        Code Status: Full code  DVT Prophylaxis: Lovenox  Baseline: Unknown baseline, likely debilitated, coming from nursing home/[facility] re[facility]      Subjective:     Chief Complaint / Reason for Physician Visit  \"Followup HILARY\".  Discussed with RN events overnight. Mentating well today.  concerned regarding renal function.      Objective:     VITALS:   Last 24hrs VS reviewed since prior progress note. Most recent are:  Patient Vitals for the past 24 hrs:   BP Temp Temp src Pulse Resp SpO2   04/29/24 1115 134/70 97.4 °F (36.3 °C) Oral 80 16 94 %   04/29/24 0930 129/75 -- -- 80 22 --   04/29/24 0715 137/79 97.4 °F (36.3 °C) Oral 80 21 95 %   04/29/24 0400 119/68 97.8 °F (36.6 °C) Oral 80 19 --   04/29/24 0005 -- -- -- -- -- 97 %   04/29/24 0000 118/73 98.4 °F (36.9 °C) Oral 94 21 --   04/28/24 2021 -- -- -- -- -- 98 %   04/28/24 2000 (!) 132/102 98.3 °F (36.8 °C) Oral 80 28 --   04/28/24 1602 (!) 133/122 97.8 °F (36.6 °C) Oral 85 16 96 %           Intake/Output Summary (Last 24 hours) at 4/29/2024 1345  Last data filed at 4/29/2024 1252  Gross per 24 hour   Intake 860 ml   Output 800 ml   Net 60 ml          I had a face to face encounter and independently examined this patient on 4/29/2024, as outlined below:  PHYSICAL EXAM:  General: Alert, cooperative  EENT:  EOMI. Anicteric sclerae.  Resp:  CTA bilaterally, no wheezing or rales.  No accessory

## 2024-04-29 NOTE — PROGRESS NOTES
Name: Marilee Oakes   MRN: [id number]  : 1950                                                           Assessment:            HILARY on CKD stage IIIb  Acute kidney injury grade 2 KDIGO classification  Urinary incontinence  ?  Urosepsis-positive for yeast; recurrent UTI  Chronic anemia  AE of COPD-mild  CHF with ICD  Afib with watchman device  L ankle fracture- s/p ORIF  MRSA wound infection- Bcx at [facility] for MRSA PTA Discussion:      Creatinine worsening (suspect ATN from Bacteremia/UTI/Afib + CRS - ?vanc)    UCPR 2.2 grams  CT A/P with no hydro  Known to have baseline CKD stage IIIb ,  lymphedema and type 2 diabetes without complications  Follows with Dr. Evangelista-baseline creatinine 1.3-1.7  No high residual on bladder scan         Plan:   Lasix on hold  Off of vanc  IV Ceftaroline per ID  Monitor UOP  A.m. labs    I talked with the patient and her .  We talked about the worsening creatinine.  I told them that there is no acute need for dialysis at this point but that HD is a possibility.   I told them that if she required dialysis I expect that it would be short term.    Discussed with patient and .      Subjective:    Confused.    Exam:  /75   Pulse 80   Temp 97.4 °F (36.3 °C) (Oral)   Resp 22   Wt 110 kg (242 lb 8.1 oz)   SpO2 95%   BMI 35.81 kg/m²     WB/WN, NAD  rhonchi  Irregular rhythm, distant  Trace edema      Labs/Data:    Lab Results   Component Value Date    WBC 4.3 2024    HGB 8.2 (L) 2024    HCT 30.0 (L) 2024    MCV 90.6 2024     2024       Lab Results   Component Value Date/Time     2024 05:55 AM    K 4.1 2024 05:55 AM     2024 05:55 AM    CO2 23 2024 05:55 AM    BUN 46 2024 05:55 AM    CREATININE 3.70 2024 05:55 AM    GLUCOSE 232 2024

## 2024-04-30 ENCOUNTER — ANESTHESIA EVENT (OUTPATIENT)
Facility: HOSPITAL | Age: 74
End: 2024-04-30
Payer: MEDICARE

## 2024-04-30 ENCOUNTER — ANESTHESIA (OUTPATIENT)
Facility: HOSPITAL | Age: 74
End: 2024-04-30
Payer: MEDICARE

## 2024-04-30 ENCOUNTER — APPOINTMENT (OUTPATIENT)
Facility: HOSPITAL | Age: 74
End: 2024-04-30
Payer: MEDICARE

## 2024-04-30 LAB
AMMONIA PLAS-SCNC: 53 UMOL/L
ANION GAP SERPL CALC-SCNC: 7 MMOL/L (ref 5–15)
BASOPHILS # BLD: 0 K/UL (ref 0–0.1)
BASOPHILS NFR BLD: 0 % (ref 0–1)
BUN SERPL-MCNC: 57 MG/DL (ref 6–20)
BUN/CREAT SERPL: 14 (ref 12–20)
CALCIUM SERPL-MCNC: 8.2 MG/DL (ref 8.5–10.1)
CHLORIDE SERPL-SCNC: 114 MMOL/L (ref 97–108)
CO2 SERPL-SCNC: 22 MMOL/L (ref 21–32)
CREAT SERPL-MCNC: 4.11 MG/DL (ref 0.55–1.02)
DIFFERENTIAL METHOD BLD: ABNORMAL
EOSINOPHIL # BLD: 0 K/UL (ref 0–0.4)
EOSINOPHIL NFR BLD: 0 % (ref 0–7)
ERYTHROCYTE [DISTWIDTH] IN BLOOD BY AUTOMATED COUNT: 18.9 % (ref 11.5–14.5)
GLUCOSE BLD STRIP.AUTO-MCNC: 204 MG/DL (ref 65–117)
GLUCOSE BLD STRIP.AUTO-MCNC: 219 MG/DL (ref 65–117)
GLUCOSE BLD STRIP.AUTO-MCNC: 235 MG/DL (ref 65–117)
GLUCOSE BLD STRIP.AUTO-MCNC: 242 MG/DL (ref 65–117)
GLUCOSE BLD STRIP.AUTO-MCNC: 351 MG/DL (ref 65–117)
GLUCOSE BLD STRIP.AUTO-MCNC: 362 MG/DL (ref 65–117)
GLUCOSE SERPL-MCNC: 225 MG/DL (ref 65–100)
HCT VFR BLD AUTO: 28 % (ref 35–47)
HGB BLD-MCNC: 7.5 G/DL (ref 11.5–16)
IMM GRANULOCYTES # BLD AUTO: 0 K/UL (ref 0–0.04)
IMM GRANULOCYTES NFR BLD AUTO: 1 % (ref 0–0.5)
LYMPHOCYTES # BLD: 0.3 K/UL (ref 0.8–3.5)
LYMPHOCYTES NFR BLD: 6 % (ref 12–49)
MAGNESIUM SERPL-MCNC: 1.8 MG/DL (ref 1.6–2.4)
MCH RBC QN AUTO: 24.8 PG (ref 26–34)
MCHC RBC AUTO-ENTMCNC: 26.8 G/DL (ref 30–36.5)
MCV RBC AUTO: 92.7 FL (ref 80–99)
MONOCYTES # BLD: 0.1 K/UL (ref 0–1)
MONOCYTES NFR BLD: 2 % (ref 5–13)
NEUTS SEG # BLD: 4 K/UL (ref 1.8–8)
NEUTS SEG NFR BLD: 91 % (ref 32–75)
NRBC # BLD: 0 K/UL (ref 0–0.01)
NRBC BLD-RTO: 0 PER 100 WBC
PHOSPHATE SERPL-MCNC: 6.5 MG/DL (ref 2.6–4.7)
PLATELET # BLD AUTO: 184 K/UL (ref 150–400)
PMV BLD AUTO: 11 FL (ref 8.9–12.9)
POTASSIUM SERPL-SCNC: 4 MMOL/L (ref 3.5–5.1)
RBC # BLD AUTO: 3.02 M/UL (ref 3.8–5.2)
RBC MORPH BLD: ABNORMAL
SERVICE CMNT-IMP: ABNORMAL
SODIUM SERPL-SCNC: 143 MMOL/L (ref 136–145)
WBC # BLD AUTO: 4.4 K/UL (ref 3.6–11)

## 2024-04-30 PROCEDURE — 6370000000 HC RX 637 (ALT 250 FOR IP): Performed by: STUDENT IN AN ORGANIZED HEALTH CARE EDUCATION/TRAINING PROGRAM

## 2024-04-30 PROCEDURE — 80048 BASIC METABOLIC PNL TOTAL CA: CPT

## 2024-04-30 PROCEDURE — 82140 ASSAY OF AMMONIA: CPT

## 2024-04-30 PROCEDURE — 87070 CULTURE OTHR SPECIMN AEROBIC: CPT

## 2024-04-30 PROCEDURE — 6360000002 HC RX W HCPCS: Performed by: STUDENT IN AN ORGANIZED HEALTH CARE EDUCATION/TRAINING PROGRAM

## 2024-04-30 PROCEDURE — 6370000000 HC RX 637 (ALT 250 FOR IP): Performed by: INTERNAL MEDICINE

## 2024-04-30 PROCEDURE — 7100000000 HC PACU RECOVERY - FIRST 15 MIN: Performed by: ORTHOPAEDIC SURGERY

## 2024-04-30 PROCEDURE — 2580000003 HC RX 258: Performed by: STUDENT IN AN ORGANIZED HEALTH CARE EDUCATION/TRAINING PROGRAM

## 2024-04-30 PROCEDURE — 6360000002 HC RX W HCPCS: Performed by: ANESTHESIOLOGY

## 2024-04-30 PROCEDURE — 84100 ASSAY OF PHOSPHORUS: CPT

## 2024-04-30 PROCEDURE — 2709999900 HC NON-CHARGEABLE SUPPLY: Performed by: ORTHOPAEDIC SURGERY

## 2024-04-30 PROCEDURE — 36415 COLL VENOUS BLD VENIPUNCTURE: CPT

## 2024-04-30 PROCEDURE — 0SPG04Z REMOVAL OF INTERNAL FIXATION DEVICE FROM LEFT ANKLE JOINT, OPEN APPROACH: ICD-10-PCS | Performed by: ORTHOPAEDIC SURGERY

## 2024-04-30 PROCEDURE — 83735 ASSAY OF MAGNESIUM: CPT

## 2024-04-30 PROCEDURE — 3700000001 HC ADD 15 MINUTES (ANESTHESIA): Performed by: ORTHOPAEDIC SURGERY

## 2024-04-30 PROCEDURE — 2580000003 HC RX 258: Performed by: INTERNAL MEDICINE

## 2024-04-30 PROCEDURE — 64447 NJX AA&/STRD FEMORAL NRV IMG: CPT | Performed by: ANESTHESIOLOGY

## 2024-04-30 PROCEDURE — 7100000001 HC PACU RECOVERY - ADDTL 15 MIN: Performed by: ORTHOPAEDIC SURGERY

## 2024-04-30 PROCEDURE — 0QSHXZZ REPOSITION LEFT TIBIA, EXTERNAL APPROACH: ICD-10-PCS | Performed by: ORTHOPAEDIC SURGERY

## 2024-04-30 PROCEDURE — 11044 DBRDMT BONE 1ST 20 SQ CM/<: CPT | Performed by: ORTHOPAEDIC SURGERY

## 2024-04-30 PROCEDURE — 64445 NJX AA&/STRD SCIATIC NRV IMG: CPT | Performed by: ANESTHESIOLOGY

## 2024-04-30 PROCEDURE — 6360000002 HC RX W HCPCS: Performed by: NURSE ANESTHETIST, CERTIFIED REGISTERED

## 2024-04-30 PROCEDURE — 6360000002 HC RX W HCPCS: Performed by: INTERNAL MEDICINE

## 2024-04-30 PROCEDURE — 2580000003 HC RX 258: Performed by: NURSE ANESTHETIST, CERTIFIED REGISTERED

## 2024-04-30 PROCEDURE — 85025 COMPLETE CBC W/AUTO DIFF WBC: CPT

## 2024-04-30 PROCEDURE — 3600000003 HC SURGERY LEVEL 3 BASE: Performed by: ORTHOPAEDIC SURGERY

## 2024-04-30 PROCEDURE — 2700000000 HC OXYGEN THERAPY PER DAY

## 2024-04-30 PROCEDURE — 3600000013 HC SURGERY LEVEL 3 ADDTL 15MIN: Performed by: ORTHOPAEDIC SURGERY

## 2024-04-30 PROCEDURE — 3700000000 HC ANESTHESIA ATTENDED CARE: Performed by: ORTHOPAEDIC SURGERY

## 2024-04-30 PROCEDURE — 87205 SMEAR GRAM STAIN: CPT

## 2024-04-30 PROCEDURE — 0QBH0ZZ EXCISION OF LEFT TIBIA, OPEN APPROACH: ICD-10-PCS | Performed by: ORTHOPAEDIC SURGERY

## 2024-04-30 PROCEDURE — 94640 AIRWAY INHALATION TREATMENT: CPT

## 2024-04-30 PROCEDURE — 2500000003 HC RX 250 WO HCPCS: Performed by: NURSE ANESTHETIST, CERTIFIED REGISTERED

## 2024-04-30 PROCEDURE — 94660 CPAP INITIATION&MGMT: CPT

## 2024-04-30 PROCEDURE — 20680 REMOVAL OF IMPLANT DEEP: CPT | Performed by: ORTHOPAEDIC SURGERY

## 2024-04-30 PROCEDURE — 2060000000 HC ICU INTERMEDIATE R&B

## 2024-04-30 PROCEDURE — 82962 GLUCOSE BLOOD TEST: CPT

## 2024-04-30 PROCEDURE — 87075 CULTR BACTERIA EXCEPT BLOOD: CPT

## 2024-04-30 RX ORDER — MIDAZOLAM HYDROCHLORIDE 1 MG/ML
INJECTION INTRAMUSCULAR; INTRAVENOUS PRN
Status: DISCONTINUED | OUTPATIENT
Start: 2024-04-30 | End: 2024-04-30 | Stop reason: SDUPTHER

## 2024-04-30 RX ORDER — SODIUM CHLORIDE 0.9 % (FLUSH) 0.9 %
5-40 SYRINGE (ML) INJECTION EVERY 12 HOURS SCHEDULED
Status: DISCONTINUED | OUTPATIENT
Start: 2024-04-30 | End: 2024-05-01

## 2024-04-30 RX ORDER — SODIUM CHLORIDE 0.9 % (FLUSH) 0.9 %
5-40 SYRINGE (ML) INJECTION EVERY 12 HOURS SCHEDULED
Status: DISCONTINUED | OUTPATIENT
Start: 2024-04-30 | End: 2024-04-30 | Stop reason: HOSPADM

## 2024-04-30 RX ORDER — FENTANYL CITRATE 50 UG/ML
50 INJECTION, SOLUTION INTRAMUSCULAR; INTRAVENOUS EVERY 5 MIN PRN
Status: DISCONTINUED | OUTPATIENT
Start: 2024-04-30 | End: 2024-04-30 | Stop reason: HOSPADM

## 2024-04-30 RX ORDER — SODIUM CHLORIDE 0.9 % (FLUSH) 0.9 %
5-40 SYRINGE (ML) INJECTION PRN
Status: DISCONTINUED | OUTPATIENT
Start: 2024-04-30 | End: 2024-04-30 | Stop reason: HOSPADM

## 2024-04-30 RX ORDER — PHENYLEPHRINE HCL IN 0.9% NACL 0.4MG/10ML
SYRINGE (ML) INTRAVENOUS PRN
Status: DISCONTINUED | OUTPATIENT
Start: 2024-04-30 | End: 2024-04-30 | Stop reason: SDUPTHER

## 2024-04-30 RX ORDER — ROPIVACAINE HYDROCHLORIDE 5 MG/ML
INJECTION, SOLUTION EPIDURAL; INFILTRATION; PERINEURAL PRN
Status: DISCONTINUED | OUTPATIENT
Start: 2024-04-30 | End: 2024-04-30 | Stop reason: SDUPTHER

## 2024-04-30 RX ORDER — SODIUM CHLORIDE 0.9 % (FLUSH) 0.9 %
5-40 SYRINGE (ML) INJECTION PRN
Status: DISCONTINUED | OUTPATIENT
Start: 2024-04-30 | End: 2024-05-01

## 2024-04-30 RX ORDER — HYDROMORPHONE HYDROCHLORIDE 1 MG/ML
0.25 INJECTION, SOLUTION INTRAMUSCULAR; INTRAVENOUS; SUBCUTANEOUS EVERY 5 MIN PRN
Status: DISCONTINUED | OUTPATIENT
Start: 2024-04-30 | End: 2024-04-30 | Stop reason: HOSPADM

## 2024-04-30 RX ORDER — NALOXONE HYDROCHLORIDE 0.4 MG/ML
INJECTION, SOLUTION INTRAMUSCULAR; INTRAVENOUS; SUBCUTANEOUS PRN
Status: DISCONTINUED | OUTPATIENT
Start: 2024-04-30 | End: 2024-04-30 | Stop reason: HOSPADM

## 2024-04-30 RX ORDER — DEXMEDETOMIDINE HYDROCHLORIDE 100 UG/ML
INJECTION, SOLUTION INTRAVENOUS PRN
Status: DISCONTINUED | OUTPATIENT
Start: 2024-04-30 | End: 2024-04-30 | Stop reason: SDUPTHER

## 2024-04-30 RX ORDER — SODIUM CHLORIDE 9 MG/ML
INJECTION, SOLUTION INTRAVENOUS PRN
Status: DISCONTINUED | OUTPATIENT
Start: 2024-04-30 | End: 2024-05-10 | Stop reason: HOSPADM

## 2024-04-30 RX ORDER — PROCHLORPERAZINE EDISYLATE 5 MG/ML
5 INJECTION INTRAMUSCULAR; INTRAVENOUS
Status: DISCONTINUED | OUTPATIENT
Start: 2024-04-30 | End: 2024-04-30 | Stop reason: HOSPADM

## 2024-04-30 RX ORDER — SODIUM CHLORIDE 9 MG/ML
INJECTION, SOLUTION INTRAVENOUS PRN
Status: DISCONTINUED | OUTPATIENT
Start: 2024-04-30 | End: 2024-04-30 | Stop reason: HOSPADM

## 2024-04-30 RX ORDER — CEFAZOLIN SODIUM 1 G/3ML
INJECTION, POWDER, FOR SOLUTION INTRAMUSCULAR; INTRAVENOUS PRN
Status: DISCONTINUED | OUTPATIENT
Start: 2024-04-30 | End: 2024-04-30 | Stop reason: SDUPTHER

## 2024-04-30 RX ORDER — SODIUM CHLORIDE, SODIUM LACTATE, POTASSIUM CHLORIDE, CALCIUM CHLORIDE 600; 310; 30; 20 MG/100ML; MG/100ML; MG/100ML; MG/100ML
INJECTION, SOLUTION INTRAVENOUS CONTINUOUS PRN
Status: DISCONTINUED | OUTPATIENT
Start: 2024-04-30 | End: 2024-04-30 | Stop reason: SDUPTHER

## 2024-04-30 RX ORDER — LINEZOLID 2 MG/ML
600 INJECTION, SOLUTION INTRAVENOUS EVERY 12 HOURS
Status: DISCONTINUED | OUTPATIENT
Start: 2024-04-30 | End: 2024-05-10 | Stop reason: HOSPADM

## 2024-04-30 RX ORDER — FENTANYL CITRATE 50 UG/ML
INJECTION, SOLUTION INTRAMUSCULAR; INTRAVENOUS PRN
Status: DISCONTINUED | OUTPATIENT
Start: 2024-04-30 | End: 2024-04-30 | Stop reason: SDUPTHER

## 2024-04-30 RX ADMIN — ROPIVACAINE HYDROCHLORIDE 30 ML: 5 INJECTION, SOLUTION EPIDURAL; INFILTRATION; PERINEURAL at 16:43

## 2024-04-30 RX ADMIN — DEXMEDETOMIDINE HYDROCHLORIDE 2 MCG: 100 INJECTION, SOLUTION, CONCENTRATE INTRAVENOUS at 17:00

## 2024-04-30 RX ADMIN — CEFAZOLIN 1.9 G: 1 INJECTION, POWDER, FOR SOLUTION INTRAMUSCULAR; INTRAVENOUS; PARENTERAL at 17:26

## 2024-04-30 RX ADMIN — ROPIVACAINE HYDROCHLORIDE 10 ML: 5 INJECTION, SOLUTION EPIDURAL; INFILTRATION; PERINEURAL at 16:48

## 2024-04-30 RX ADMIN — SODIUM CHLORIDE, SODIUM LACTATE, POTASSIUM CHLORIDE, AND CALCIUM CHLORIDE: 600; 310; 30; 20 INJECTION, SOLUTION INTRAVENOUS at 16:45

## 2024-04-30 RX ADMIN — Medication 40 MCG: at 17:42

## 2024-04-30 RX ADMIN — CEFTAROLINE FOSAMIL 300 MG: 600 POWDER, FOR SOLUTION INTRAVENOUS at 05:49

## 2024-04-30 RX ADMIN — WATER 60 MG: 1 INJECTION INTRAMUSCULAR; INTRAVENOUS; SUBCUTANEOUS at 21:49

## 2024-04-30 RX ADMIN — IPRATROPIUM BROMIDE AND ALBUTEROL SULFATE 1 DOSE: .5; 3 SOLUTION RESPIRATORY (INHALATION) at 07:45

## 2024-04-30 RX ADMIN — DEXMEDETOMIDINE HYDROCHLORIDE 2 MCG: 100 INJECTION, SOLUTION, CONCENTRATE INTRAVENOUS at 17:03

## 2024-04-30 RX ADMIN — FENTANYL CITRATE 50 MCG: 50 INJECTION, SOLUTION INTRAMUSCULAR; INTRAVENOUS at 16:28

## 2024-04-30 RX ADMIN — MIDAZOLAM HYDROCHLORIDE 1 MG: 1 INJECTION, SOLUTION INTRAMUSCULAR; INTRAVENOUS at 16:44

## 2024-04-30 RX ADMIN — DEXMEDETOMIDINE HYDROCHLORIDE 2 MCG: 100 INJECTION, SOLUTION, CONCENTRATE INTRAVENOUS at 17:06

## 2024-04-30 RX ADMIN — ASPIRIN 81 MG: 81 TABLET, CHEWABLE ORAL at 09:33

## 2024-04-30 RX ADMIN — Medication 80 MCG: at 17:30

## 2024-04-30 RX ADMIN — SODIUM CHLORIDE, PRESERVATIVE FREE 10 ML: 5 INJECTION INTRAVENOUS at 21:59

## 2024-04-30 RX ADMIN — Medication 4 UNITS: at 12:53

## 2024-04-30 RX ADMIN — SODIUM CHLORIDE, PRESERVATIVE FREE 10 ML: 5 INJECTION INTRAVENOUS at 09:34

## 2024-04-30 RX ADMIN — WATER 60 MG: 1 INJECTION INTRAMUSCULAR; INTRAVENOUS; SUBCUTANEOUS at 05:46

## 2024-04-30 RX ADMIN — LINEZOLID 600 MG: 600 INJECTION, SOLUTION INTRAVENOUS at 09:48

## 2024-04-30 RX ADMIN — ARFORMOTEROL TARTRATE: 15 SOLUTION RESPIRATORY (INHALATION) at 07:45

## 2024-04-30 RX ADMIN — Medication 80 MCG: at 17:36

## 2024-04-30 RX ADMIN — CEFAZOLIN 0.1 G: 1 INJECTION, POWDER, FOR SOLUTION INTRAMUSCULAR; INTRAVENOUS; PARENTERAL at 17:20

## 2024-04-30 RX ADMIN — WATER 60 MG: 1 INJECTION INTRAMUSCULAR; INTRAVENOUS; SUBCUTANEOUS at 12:54

## 2024-04-30 RX ADMIN — LINEZOLID 600 MG: 600 INJECTION, SOLUTION INTRAVENOUS at 21:56

## 2024-04-30 RX ADMIN — DEXMEDETOMIDINE HYDROCHLORIDE 2 MCG: 100 INJECTION, SOLUTION, CONCENTRATE INTRAVENOUS at 17:09

## 2024-04-30 RX ADMIN — FENTANYL CITRATE 50 MCG: 50 INJECTION, SOLUTION INTRAMUSCULAR; INTRAVENOUS at 16:47

## 2024-04-30 RX ADMIN — Medication 2 UNITS: at 09:30

## 2024-04-30 RX ADMIN — CARVEDILOL 25 MG: 12.5 TABLET, FILM COATED ORAL at 09:33

## 2024-04-30 RX ADMIN — ARFORMOTEROL TARTRATE: 15 SOLUTION RESPIRATORY (INHALATION) at 21:13

## 2024-04-30 RX ADMIN — ISOSORBIDE MONONITRATE 60 MG: 30 TABLET, EXTENDED RELEASE ORAL at 09:33

## 2024-04-30 RX ADMIN — MIDAZOLAM HYDROCHLORIDE 1 MG: 1 INJECTION, SOLUTION INTRAMUSCULAR; INTRAVENOUS at 16:28

## 2024-04-30 RX ADMIN — Medication 40 MCG: at 17:26

## 2024-04-30 RX ADMIN — IPRATROPIUM BROMIDE AND ALBUTEROL SULFATE 1 DOSE: .5; 3 SOLUTION RESPIRATORY (INHALATION) at 19:25

## 2024-04-30 RX ADMIN — PANTOPRAZOLE SODIUM 40 MG: 40 TABLET, DELAYED RELEASE ORAL at 05:46

## 2024-04-30 ASSESSMENT — PAIN SCALES - GENERAL
PAINLEVEL_OUTOF10: 0

## 2024-04-30 ASSESSMENT — PAIN - FUNCTIONAL ASSESSMENT: PAIN_FUNCTIONAL_ASSESSMENT: 0-10

## 2024-04-30 NOTE — PROGRESS NOTES
Occupational Therapy    Chart reviewed, patient is currently off the floor for hardware removal and I & D.  Will defer and continue to follow for OT services.     Tom Vargas, OTR/L

## 2024-04-30 NOTE — PROGRESS NOTES
Brief orthopedic note:    Imaging reviewed of left ankle.    Plan:  - It appears patient has unstable joint of left ankle based on films.   - Will plan for left ankle hardware removal and I&D by Dr Farnsworth tomorrow afternoon  - NPO after midnight  - hold lovenox in am  - Obtain consent  - Will discuss in detail procedure and future plans with patient in am     Dr Farnsworth aware and agrees   Robbin Khalil PA-C

## 2024-04-30 NOTE — ANESTHESIA PROCEDURE NOTES
Peripheral Block    Patient location during procedure: pre-op  Reason for block: post-op pain management  Start time: 4/30/2024 4:28 PM  End time: 4/30/2024 4:43 PM  Staffing  Performed: anesthesiologist   Anesthesiologist: Chin Godoy MD  Performed by: Chin Godoy MD  Authorized by: Chin Godoy MD    Preanesthetic Checklist  Completed: patient identified, IV checked, site marked, risks and benefits discussed, surgical/procedural consents, equipment checked, pre-op evaluation, timeout performed, anesthesia consent given, oxygen available, monitors applied/VS acknowledged, fire risk safety assessment completed and verbalized and blood product R/B/A discussed and consented  Peripheral Block   Block type: Sciatic  Popliteal  Laterality: left  Injection technique: single-shot  Guidance: nerve stimulator and ultrasound guided    Needle   Needle type: insulated echogenic nerve stimulator needle   Needle gauge: 20 G  Needle localization: nerve stimulator and ultrasound guidance  Needle length: 8 cm  Assessment   Injection assessment: negative aspiration for heme, no paresthesia on injection, local visualized surrounding nerve on ultrasound and no intravascular symptoms  Paresthesia pain: none  Slow fractionated injection: yes  Hemodynamics: stable  Outcomes: uncomplicated and patient tolerated procedure well

## 2024-04-30 NOTE — PROGRESS NOTES
Vascular access team to room for PICC line dressing change, patient currently off the floor in OR.    Jil Cole RN, [facility]-BC  Vascular Access Team

## 2024-04-30 NOTE — PROGRESS NOTES
Hospitalist Progress Note    NAME:   Marilee Oakes   : 1950   MRN: [id number]     Date/Time: 2024 4:10 PM  Patient PCP: No primary care provider on file.    Estimated discharge date:   Barriers: recovery after Orthopedic procedure, renal improvement, final antibiotic plan, Patient and family adamantly do not want patient to go back to [facility]      Assessment / Plan:    MRSA wound infection  Recurrent UTI  Left ankle fracture s/p ORIF  Blood cultures taken from [facility] were positive for MRSA per MD at [facility], prompting prior admission. No blood cultures here are positive. Reportedly had rash with Daptomycin and now renal failure with Vancomycin.  Left leg bandaged.  Patient is afebrile, white count is normal, procalcitonin is negative.  - ID consulted, appreciate assistance  - Ceftaroline  - PICC line is present  - Ortho consulted, appreciate assistance  - going back to OR with Ortho  for hardware removal and I&D    CHF with ICD - mild exacerbation, improving  CAD  A-fib with Watchman device  Continue with aspirin, Imdur, Coreg.  - BNP elevated, coarse lung sounds  - cpap overnight  - holding lasix with renal function declining    Acute on chronic renal failure   CKD 3 with baseline creatinine between 1.2-1.6.  Monitor patient telemetry.  Hold Bumex.  - lasix as above  Nephrology following, Cr uptrending, no need for HD    COPD - exacerbation, improving  Chronic respiratory failure - 2L at baseline  Wheezing noted  - prednisone changed to methylpred given worsening  evening  - scheduled duonebs  - weaning steroids  - back to 2L which is her basline    Toxic metabolic encephalopathy - improving  Patient has been lethargic.  CT head is negative.  Continue to monitor in telemetry with neurochecks every 4 hours.  Treat underlying cause.  - waxing and waning mental status, suspect delirium along with mild hypercarbia  - changed lyrica from bid to qhs, may be contributing    Possible  face to face encounter and independently examined this patient on 4/30/2024, as outlined below:  PHYSICAL EXAM:  General: Alert, cooperative  EENT:  EOMI. Anicteric sclerae.  Resp:  CTA bilaterally, no wheezing or rales.  No accessory muscle use  CV:  Regular  rate,  No edema  GI:  Soft, Non distended, Non tender.  +Bowel sounds  Neurologic:  Alert and oriented X 4, more alert, normal speech,   Skin:  No rashes.  No jaundice    Reviewed most current lab test results and cultures  YES  Reviewed most current radiology test results   YES  Review and summation of old records today    NO  Reviewed patient's current orders and MAR    YES  PMH/SH reviewed - no change compared to H&P    Procedures: see electronic medical records for all procedures/Xrays and details which were not copied into this note but were reviewed prior to creation of Plan.      LABS:  I reviewed today's most current labs and imaging studies.  Pertinent labs include:  Recent Labs     04/28/24  0349 04/29/24  0555 04/30/24  0350   WBC 2.4* 4.3 4.4   HGB 7.4* 8.2* 7.5*   HCT 26.9* 30.0* 28.0*    190 184       Recent Labs     04/28/24  0349 04/29/24  0555 04/30/24  0350    143 143   K 4.1 4.1 4.0   * 113* 114*   CO2 26 23 22   GLUCOSE 207* 232* 225*   BUN 35* 46* 57*   CREATININE 2.90* 3.70* 4.11*   CALCIUM 8.6 8.6 8.2*   MG 1.9 1.8 1.8   PHOS 5.2* 7.0* 6.5*         Signed: David L Mendel, MD

## 2024-04-30 NOTE — BRIEF OP NOTE
Brief Postoperative Note      Patient: Marilee Oakes  YOB: 1950  MRN: [id number]    Date of Procedure: 4/30/2024    Pre-Op Diagnosis Codes:     Left ankle infected nonunion, failed ortho hardware    Post-Op Diagnosis: Same       Procedure(s):  ANKLE INCISION AND DRAINAGE WITH HARDWARE REMOVAL, excisional debridement of bone, soft tissue, tendon, muscle, closed reduction and splint    Surgeon(s):  Edgardo Farnsworth DO    Assistant:  First Assistant: Jyotsna Rivas APRN - NP    Anesthesia: Choice    Estimated Blood Loss (mL): less than 50     Complications: None    Specimens:   ID Type Source Tests Collected by Time Destination   1 : Left Ankle Deep Wound Culture Tissue Ankle CULTURE, WOUND, CULTURE, TISSUE Edgardo Farnsworth DO 4/30/2024 1720        Implants:  * No implants in log *      Drains: * No LDAs found *    Findings:  Infection Present At Time Of Surgery (PATOS) (choose all levels that have infection present):  - Deep Infection (muscle/fascia) present as evidenced by phlegmon  Other Findings: nonunion    Electronically signed by Edgardo Farnsworth DO on 4/30/2024 at 6:30 PM

## 2024-04-30 NOTE — ANESTHESIA PROCEDURE NOTES
Peripheral Block    Patient location during procedure: pre-op  Reason for block: post-op pain management  Start time: 4/30/2024 4:44 PM  End time: 4/30/2024 4:48 PM  Staffing  Performed: anesthesiologist   Anesthesiologist: Chin Godoy MD  Performed by: Chin Godoy MD  Authorized by: Chin Godoy MD    Preanesthetic Checklist  Completed: patient identified, IV checked, site marked, risks and benefits discussed, surgical/procedural consents, equipment checked, pre-op evaluation, timeout performed, anesthesia consent given, oxygen available, monitors applied/VS acknowledged, fire risk safety assessment completed and verbalized and blood product R/B/A discussed and consented  Peripheral Block   Block type: Femoral  Laterality: left  Injection technique: single-shot  Guidance: nerve stimulator and ultrasound guided    Needle   Needle type: insulated echogenic nerve stimulator needle   Needle gauge: 20 G  Needle localization: nerve stimulator and ultrasound guidance  Needle length: 8 cm  Assessment   Injection assessment: negative aspiration for heme, no paresthesia on injection, local visualized surrounding nerve on ultrasound and no intravascular symptoms  Paresthesia pain: none  Slow fractionated injection: yes  Hemodynamics: stable  Outcomes: uncomplicated and patient tolerated procedure well

## 2024-04-30 NOTE — PROGRESS NOTES
End of Shift Note    Bedside shift change report given to AGENS Nuñez (oncoming nurse) by Vera Richardson, AGNES (offgoing nurse).  Report included the following information SBAR, Kardex, and MAR    Shift worked:  Days     Shift summary and any significant changes:     Ortho PA Remi took sutures out and redressed L ankle    Plan for L ankle hardware removal & I&D by Dr Farnsworth today. Pt still off the floor at shift change     ASHANTI 5/2, Home with [facility]   Concerns for physician to address:  -     Zone phone for oncoming shift:   -       Length of Stay:  Expected LOS: 8  Actual LOS: 5      Vera Richardson, RN

## 2024-04-30 NOTE — PLAN OF CARE
skin breakdown  Description: 1.  Monitor for areas of redness and/or skin breakdown  2.  Assess vascular access sites hourly  3.  Every 4-6 hours minimum:  Change oxygen saturation probe site  4.  Every 4-6 hours:  If on nasal continuous positive airway pressure, respiratory therapy assess nares and determine need for appliance change or resting period.  Outcome: Progressing     Problem: Safety - Adult  Goal: Free from fall injury  Outcome: Progressing     Problem: ABCDS Injury Assessment  Goal: Absence of physical injury  Outcome: Progressing     Problem: Chronic Conditions and Co-morbidities  Goal: Patient's chronic conditions and co-morbidity symptoms are monitored and maintained or improved  Outcome: Progressing     Problem: Physical Therapy - Adult  Goal: By Discharge: Performs mobility at highest level of function for planned discharge setting.  See evaluation for individualized goals.  Description: FUNCTIONAL STATUS PRIOR TO ADMISSION: The patient is a questionable historian. Presenting from [facility]. Multiple recent hospitalizations.  At [facility] for rehabilitation s/p fall with L ankle fx ORIF done on 3/23/24. pt is NWB and was working on scooting, lateral transfers, and standing at rehab in parallel bars to this point (per patient report).    HOME SUPPORT PRIOR TO ADMISSION: The patient lived with spouse. Admitted from [facility].    Physical Therapy Goals  Initiated 4/26/2024  1.  Patient will move from supine to sit and sit to supine, scoot up and down, and roll side to side in bed with modified independence within 7 day(s).    2.  Patient will perform sit to stand with moderate assistance within 7 day(s).  3.  Patient will transfer from bed to chair and chair to bed with contact guard assist using the least restrictive device within 7 day(s).  4/29/2024 1743 by Rico Shepherd, PT  Outcome: Progressing     Problem: Respiratory - Adult  Goal: Achieves optimal ventilation and oxygenation  4/30/2024 0238 by  during functional activities with verbal cues within 7 day(s).  4/29/2024 1747 by Tom Vargas, OTR/L  Outcome: Not Progressing  4/29/2024 1747 by Tom Vargas, OTR/L  Outcome: Progressing

## 2024-04-30 NOTE — ANESTHESIA PRE PROCEDURE
(gastroesophageal reflux disease)    • History of diverticulitis     diverticulitis   • History of echocardiogram 11/2021    LV: Estimated LVEF is 40 - 45%. Visually measured ejection fraction. Normal cavity size and wall thickness. Globally reduced systolic function.  LA: Moderately dilated left atrium. Left atrial appendage is completely occluded. A Watchman left atrial appendage occluder is present and completely occludes the appendage.   • History of migraine    • Hypercholesterolemia 01/22/2018   • Irritable bowel syndrome     IBS, spinal stenosis   • Long-term use of high-risk medication 01/22/2018   • Lumbar spinal stenosis    • Morbid (severe) obesity due to excess calories ([facility])    • Neuropathy    • Obesity (BMI 30-39.9) 04/30/2019   • Obstructive sleep apnea     uses CPAP   • Permanent atrial fibrillation ([facility])    • Presence of implantable cardioverter-defibrillator (ICD)    • Presence of Watchman left atrial appendage closure device    • Type 2 diabetes mellitus with diabetic neuropathy, without long-term current use of insulin ([facility]) 06/05/2016   • Venous insufficiency    • Vitamin B12 deficiency        Past Surgical History:        Procedure Laterality Date   • ANKLE FRACTURE SURGERY Left 3/23/2024    LEFT ANKLE OPEN REDUCTION INTERNAL FIXATION performed by Edgardo Farnsworth DO at [facility] MAIN OR   • APPENDECTOMY     • BREAST BIOPSY Left     about 4-5 years ago from 2022, neg   • CHOLECYSTECTOMY  11/15/2018   • COLONOSCOPY N/A 3/14/2018    COLONOSCOPY performed by Pepito Quintero MD at [facility] ENDOSCOPY   • HYSTERECTOMY (CERVIX STATUS UNKNOWN)     • IR KYPHOPLASTY LUMBAR FIRST LEVEL  12/22/2020    IR KYPHOPLASTY LUMBAR FIRST LEVEL 12/22/2020 [facility]   • IR KYPHOPLASTY LUMBAR FIRST LEVEL  12/22/2020   • IR KYPHOPLASTY THORACIC FIRST LEVEL  1/6/2021    IR KYPHOPLASTY THORACIC FIRST LEVEL 1/6/2021 [facility]   • IR KYPHOPLASTY THORACIC FIRST LEVEL  1/6/2021   • FRANCISCA STEROTACTIC LOC BREAST BIOPSY

## 2024-04-30 NOTE — PROGRESS NOTES
Endocarditis 12/26/2023    Nausea and vomiting 12/21/2023    MRSA bacteremia 12/21/2023    Ulcer of right foot with muscle involvement without evidence of necrosis ([facility]) 12/21/2023    Diabetic polyneuropathy associated with type 2 diabetes mellitus ([facility]) 12/21/2023    Poorly controlled diabetes mellitus ([facility]) 12/21/2023    Presence of combination internal cardiac defibrillator (ICD) and pacemaker 12/21/2023    Fever and chills 12/19/2023    Acute congestive heart failure, unspecified heart failure type ([facility]) 12/09/2023    Acute respiratory failure with hypoxia ([facility]) 12/08/2023    Intertriginous dermatitis associated with moisture 07/28/2023    Presence of Watchman left atrial appendage closure device 10/06/2021    Atrial fibrillation ([facility]) 10/06/2021    Polypharmacy 05/19/2021    Open wound of left lower leg 03/31/2021    Lethargy 01/01/2021    Hypoglycemia 01/01/2021    Hypoxia 12/13/2020    Lower back pain 12/13/2020    Type 2 diabetes with nephropathy ([facility]) 04/30/2020    Acute exacerbation of COPD with asthma ([facility]) 08/17/2019    Asthma 01/31/2019    S/P placement of cardiac pacemaker 11/13/2018    Hypokalemia 10/07/2018    Anxiety and depression 01/22/2018    Chronic atrial fibrillation ([facility]) 06/05/2016    Lower abdominal pain 06/05/2016    Type 2 diabetes mellitus with diabetic neuropathy, without long-term current use of insulin ([facility]) 06/05/2016    Chronic systolic heart failure ([facility]) 06/05/2016    Recurrent UTI 06/04/2016    Foot pain, right 11/20/2013    HBP (high blood pressure) 02/22/2013    IBS (irritable bowel syndrome) 02/22/2013    Spinal stenosis, lumbar region, without neurogenic claudication 02/22/2013    B12 deficiency 02/22/2013    Right knee DJD 02/22/2013    Ataxia 02/22/2013     Principal Problem:    HILARY (acute kidney injury) ([facility])  Active Problems:    Coronary artery disease due to lipid rich plaque    Surgical site infection    Antibiotic causing adverse effect    Yeast UTI    Chronic  obstructive pulmonary disease (HCC)    Diabetes mellitus due to underlying condition, controlled, with complication (HCC)  Resolved Problems:    * No resolved hospital problems. *      Plan:       -  Medicine for Pre-Operative Clearence/ Post-Operative management.   - ID managing IV abx   -  Discussed tx options.  Based on fx displacement and signs of hardware failure, likely infected nonunion.  Will take intra-op cultures.  Will eventually need TTC nail if infection clears.   The risks of surgery were explained.  Risks of infection, blood loss, neurovascular injury, anesthesia risks, and risks secondary to patient comorbidities were explained.              Edgardo Farnsworth, DO  [facility]

## 2024-04-30 NOTE — PERIOP NOTE
TRANSFER - IN REPORT:    Verbal report received from Vera ALARCON on Marilee Oakes  being received from [facility] Room 2137 for ordered procedure      Report consisted of patient's Situation, Background, Assessment and   Recommendations(SBAR).     Information from the following report(s) Nurse Handoff Report, Surgery Report, Intake/Output, MAR, Recent Results, Med Rec Status, Cardiac Rhythm Vpaced, and Procedure Verification was reviewed with the receiving nurse.    Opportunity for questions and clarification was provided.      Assessment completed upon patient's arrival to unit and care assumed.

## 2024-04-30 NOTE — PERIOP NOTE
15:35= brought to Pre OP via bed from room 2137; awake and alert, knows year, where she is, but did not know month and stated they were \"removing crowns\" when asked about procedure; consent for OR previously signed in room; d/t pt being slightly confused, telephone consent called to Wesley (spouse) for procedure and anesthesia (2), and verified with Lainey Curiel, AGNES.    15:44= Dr. Godoy in to discuss Anesthesia. Informed of glucose 235; no orders received; also informed that Vera, Primary RN, stated pt had \"a little bit of cereal and water at 09:00\";  was feeding her; RN informed spouse to not feed anymore food or drink.    15:50= warming blanket applied.    15:59= music therapy (Michael Mathur Greatest Hits) playing to calm prior to surgery.    16:19= mepilex dsg applied to sacrum; appears to be old darkened skin, blanchable, but no erythema or skin breakdown; repositioned in bed; ready for OR; call bell in reach, music playing, lights dimmed. Unable to remove ring from left fourth finger; taped to finger, telephone consent for jewelry called to Wesley (spouse), and second verification with AGNES Betts.    16:28= timeout performed with Dr. Godoy; 2LO2NC currently on pt and frequent VS started.     16:44= second timeout performed with DR. Godoy for femoral block; currently on 5LO2NC d/t O2 decreasing to mid 70s-80s. and frequent VS cycling. Dr. Farnsworth in to sign left ankle.

## 2024-04-30 NOTE — PROGRESS NOTES
Physical Therapy    Chart reviewed, patient is currently off the floor for hardware removal and I & D.  Will defer and continue to follow.    RUMA Ayers

## 2024-04-30 NOTE — PROGRESS NOTES
Name: Marilee Oakes   MRN: [id number]  : 1950                                                           Assessment:            HILARY on CKD stage IIIb  Acute kidney injury grade 2 KDIGO classification  Urinary incontinence  ?  Urosepsis-positive for yeast; recurrent UTI  Chronic anemia  AE of COPD-mild  CHF with ICD  Afib with watchman device  L ankle fracture- s/p ORIF  MRSA wound infection- Bcx at [facility] for MRSA PTA Discussion:      Creatinine worsening (suspect ATN from infection/UTI/Afib + CRS - ?vanc)    UCPR 2.2 grams  CT A/P with no hydro  Known to have baseline CKD stage IIIb ,  lymphedema and type 2 diabetes without complications  Follows with Dr. Evangelista-baseline creatinine 1.3-1.7  No high residual on bladder scan         Plan:   Lasix on hold  Off of vanc  IV Ceftaroline per ID  Monitor UOP  AM labs    I talked with the patient and her .  We talked about the worsening creatinine.  I told them that there is no acute need for dialysis at this point but that HD is a possibility.   I told them that if she required dialysis I expect that it would be short term.    Discussed with patient and .      Subjective:    Confused.    Exam:  BP (!) 141/99   Pulse 80   Temp 98 °F (36.7 °C) (Oral)   Resp 18   Wt 110 kg (242 lb 8.1 oz)   SpO2 97%   BMI 35.81 kg/m²     WB/WN, NAD  rhonchi  Irregular rhythm, distant  Trace edema      Labs/Data:    Lab Results   Component Value Date    WBC 4.4 2024    HGB 7.5 (L) 2024    HCT 28.0 (L) 2024    MCV 92.7 2024     2024       Lab Results   Component Value Date/Time     2024 03:50 AM    K 4.0 2024 03:50 AM     2024 03:50 AM    CO2 22 2024 03:50 AM    BUN 57 2024 03:50 AM    CREATININE 4.11 2024 03:50 AM    GLUCOSE 225 2024

## 2024-04-30 NOTE — CARE COORDINATION
Transition of Care Plan:    RUR: 29    Prior Level of Functioning: Pt has the following DME at home: hospital bed, shower chair, rollator, home oxygen ([employer] wheelchair, and sliding board. Spouse is main caregiver support but also endorses family support of two local daughters and two sisters.  Pt needs assistance with ADLs: Bathing, Housework, Shopping and Mobility.    Disposition:  Home with [facility].   1:53 PM   CM completed chart review.   Therapy rec IPR and Pt refusing and wants to go Home with [facility].   Pt has a new PCP appt scheduled or 6/21 with Dr. Mathur.  [facility] has a PCP that can sign HH orders until new PCP can be seen.   - CM sent referral to [facility] via [facility] and we will need to send HH order and DC summary to them prior to DC.     ASHANTI: 5/2?    Pt needs Nephrology/Ortho/ID Clearance.  CM will need to know the final rec for IV ABX if that is needed at DC so that we can get that process started.     If SNF or IPR: Date FOC offered:   Date FOC received:   Accepting facility:   Date authorization started with reference number:   Date authorization received and expires:     Follow up appointments:PCP: Kevan: 6/21/24  ID: Ned: 5/14 at 2 PM   DME needed: n/a  Transportation at discharge: BLS transport will need to be arranged.   IM/IMM Medicare/ letter given: 2nd IM letter will need to be delivered.   Is patient a  and connected with [facility]? N/a   If yes, was [facility] transfer form completed and [facility] notified?   Caregiver Contact: Spouse: Wesley Oakes; [phone]  Discharge Caregiver contacted prior to discharge? yes  Care Conference needed? N/a  Barriers to discharge: Renal Improvement  Ortho and ID Clearance  Need for IV ABX: Final Rec?    Jaclyn Haro, MELVIN  [id number]

## 2024-05-01 LAB
ANION GAP SERPL CALC-SCNC: 7 MMOL/L (ref 5–15)
BACTERIA SPEC CULT: NORMAL
BACTERIA SPEC CULT: NORMAL
BASOPHILS # BLD: 0 K/UL (ref 0–0.1)
BASOPHILS NFR BLD: 0 % (ref 0–1)
BUN SERPL-MCNC: 69 MG/DL (ref 6–20)
BUN/CREAT SERPL: 16 (ref 12–20)
CALCIUM SERPL-MCNC: 8.2 MG/DL (ref 8.5–10.1)
CHLORIDE SERPL-SCNC: 112 MMOL/L (ref 97–108)
CO2 SERPL-SCNC: 22 MMOL/L (ref 21–32)
CREAT SERPL-MCNC: 4.35 MG/DL (ref 0.55–1.02)
DIFFERENTIAL METHOD BLD: ABNORMAL
EOSINOPHIL # BLD: 0 K/UL (ref 0–0.4)
EOSINOPHIL NFR BLD: 0 % (ref 0–7)
ERYTHROCYTE [DISTWIDTH] IN BLOOD BY AUTOMATED COUNT: 18.8 % (ref 11.5–14.5)
GLUCOSE BLD STRIP.AUTO-MCNC: 214 MG/DL (ref 65–117)
GLUCOSE BLD STRIP.AUTO-MCNC: 234 MG/DL (ref 65–117)
GLUCOSE BLD STRIP.AUTO-MCNC: 257 MG/DL (ref 65–117)
GLUCOSE BLD STRIP.AUTO-MCNC: 348 MG/DL (ref 65–117)
GLUCOSE BLD STRIP.AUTO-MCNC: 352 MG/DL (ref 65–117)
GLUCOSE BLD STRIP.AUTO-MCNC: 363 MG/DL (ref 65–117)
GLUCOSE BLD STRIP.AUTO-MCNC: 371 MG/DL (ref 65–117)
GLUCOSE SERPL-MCNC: 229 MG/DL (ref 65–100)
HCT VFR BLD AUTO: 27.9 % (ref 35–47)
HGB BLD-MCNC: 7.5 G/DL (ref 11.5–16)
IMM GRANULOCYTES # BLD AUTO: 0 K/UL (ref 0–0.04)
IMM GRANULOCYTES NFR BLD AUTO: 1 % (ref 0–0.5)
LYMPHOCYTES # BLD: 0.3 K/UL (ref 0.8–3.5)
LYMPHOCYTES NFR BLD: 6 % (ref 12–49)
MCH RBC QN AUTO: 24.4 PG (ref 26–34)
MCHC RBC AUTO-ENTMCNC: 26.9 G/DL (ref 30–36.5)
MCV RBC AUTO: 90.9 FL (ref 80–99)
MONOCYTES # BLD: 0 K/UL (ref 0–1)
MONOCYTES NFR BLD: 1 % (ref 5–13)
NEUTS SEG # BLD: 4.2 K/UL (ref 1.8–8)
NEUTS SEG NFR BLD: 92 % (ref 32–75)
NRBC # BLD: 0 K/UL (ref 0–0.01)
NRBC BLD-RTO: 0 PER 100 WBC
PHOSPHATE SERPL-MCNC: 7.3 MG/DL (ref 2.6–4.7)
PLATELET # BLD AUTO: 167 K/UL (ref 150–400)
PMV BLD AUTO: 11.3 FL (ref 8.9–12.9)
POTASSIUM SERPL-SCNC: 4.3 MMOL/L (ref 3.5–5.1)
RBC # BLD AUTO: 3.07 M/UL (ref 3.8–5.2)
RBC MORPH BLD: ABNORMAL
RBC MORPH BLD: ABNORMAL
SERVICE CMNT-IMP: ABNORMAL
SERVICE CMNT-IMP: NORMAL
SERVICE CMNT-IMP: NORMAL
SODIUM SERPL-SCNC: 141 MMOL/L (ref 136–145)
WBC # BLD AUTO: 4.5 K/UL (ref 3.6–11)

## 2024-05-01 PROCEDURE — 36415 COLL VENOUS BLD VENIPUNCTURE: CPT

## 2024-05-01 PROCEDURE — 6360000002 HC RX W HCPCS: Performed by: INTERNAL MEDICINE

## 2024-05-01 PROCEDURE — 84100 ASSAY OF PHOSPHORUS: CPT

## 2024-05-01 PROCEDURE — 6360000002 HC RX W HCPCS: Performed by: STUDENT IN AN ORGANIZED HEALTH CARE EDUCATION/TRAINING PROGRAM

## 2024-05-01 PROCEDURE — 94640 AIRWAY INHALATION TREATMENT: CPT

## 2024-05-01 PROCEDURE — 80048 BASIC METABOLIC PNL TOTAL CA: CPT

## 2024-05-01 PROCEDURE — 94660 CPAP INITIATION&MGMT: CPT

## 2024-05-01 PROCEDURE — 85025 COMPLETE CBC W/AUTO DIFF WBC: CPT

## 2024-05-01 PROCEDURE — 6370000000 HC RX 637 (ALT 250 FOR IP): Performed by: INTERNAL MEDICINE

## 2024-05-01 PROCEDURE — 6370000000 HC RX 637 (ALT 250 FOR IP): Performed by: STUDENT IN AN ORGANIZED HEALTH CARE EDUCATION/TRAINING PROGRAM

## 2024-05-01 PROCEDURE — 2060000000 HC ICU INTERMEDIATE R&B

## 2024-05-01 PROCEDURE — 83735 ASSAY OF MAGNESIUM: CPT

## 2024-05-01 PROCEDURE — 2580000003 HC RX 258: Performed by: STUDENT IN AN ORGANIZED HEALTH CARE EDUCATION/TRAINING PROGRAM

## 2024-05-01 PROCEDURE — APPNB30 APP NON BILLABLE TIME 0-30 MINS: Performed by: PHYSICIAN ASSISTANT

## 2024-05-01 PROCEDURE — 2580000003 HC RX 258: Performed by: INTERNAL MEDICINE

## 2024-05-01 RX ADMIN — CARVEDILOL 25 MG: 12.5 TABLET, FILM COATED ORAL at 08:59

## 2024-05-01 RX ADMIN — ARFORMOTEROL TARTRATE: 15 SOLUTION RESPIRATORY (INHALATION) at 09:21

## 2024-05-01 RX ADMIN — SODIUM CHLORIDE, PRESERVATIVE FREE 10 ML: 5 INJECTION INTRAVENOUS at 09:00

## 2024-05-01 RX ADMIN — Medication 8 UNITS: at 12:41

## 2024-05-01 RX ADMIN — Medication 2 UNITS: at 09:02

## 2024-05-01 RX ADMIN — SODIUM CHLORIDE, PRESERVATIVE FREE 10 ML: 5 INJECTION INTRAVENOUS at 21:03

## 2024-05-01 RX ADMIN — ARFORMOTEROL TARTRATE: 15 SOLUTION RESPIRATORY (INHALATION) at 19:52

## 2024-05-01 RX ADMIN — PANTOPRAZOLE SODIUM 40 MG: 40 TABLET, DELAYED RELEASE ORAL at 05:57

## 2024-05-01 RX ADMIN — LINEZOLID 600 MG: 600 INJECTION, SOLUTION INTRAVENOUS at 21:10

## 2024-05-01 RX ADMIN — ISOSORBIDE MONONITRATE 60 MG: 30 TABLET, EXTENDED RELEASE ORAL at 09:00

## 2024-05-01 RX ADMIN — IPRATROPIUM BROMIDE AND ALBUTEROL SULFATE 1 DOSE: .5; 3 SOLUTION RESPIRATORY (INHALATION) at 19:45

## 2024-05-01 RX ADMIN — CARVEDILOL 25 MG: 12.5 TABLET, FILM COATED ORAL at 18:18

## 2024-05-01 RX ADMIN — IPRATROPIUM BROMIDE AND ALBUTEROL SULFATE 1 DOSE: .5; 3 SOLUTION RESPIRATORY (INHALATION) at 09:15

## 2024-05-01 RX ADMIN — CLONAZEPAM 0.5 MG: 0.5 TABLET ORAL at 21:03

## 2024-05-01 RX ADMIN — WATER 60 MG: 1 INJECTION INTRAMUSCULAR; INTRAVENOUS; SUBCUTANEOUS at 21:03

## 2024-05-01 RX ADMIN — PREGABALIN 150 MG: 75 CAPSULE ORAL at 21:03

## 2024-05-01 RX ADMIN — LINEZOLID 600 MG: 600 INJECTION, SOLUTION INTRAVENOUS at 10:43

## 2024-05-01 RX ADMIN — WATER 60 MG: 1 INJECTION INTRAMUSCULAR; INTRAVENOUS; SUBCUTANEOUS at 12:40

## 2024-05-01 RX ADMIN — ASPIRIN 81 MG: 81 TABLET, CHEWABLE ORAL at 09:00

## 2024-05-01 RX ADMIN — WATER 60 MG: 1 INJECTION INTRAMUSCULAR; INTRAVENOUS; SUBCUTANEOUS at 05:56

## 2024-05-01 RX ADMIN — Medication 4 UNITS: at 18:18

## 2024-05-01 NOTE — PLAN OF CARE
Problem: Discharge Planning  Goal: Discharge to home or other facility with appropriate resources  Outcome: Progressing     Problem: Skin/Tissue Integrity  Goal: Absence of new skin breakdown  Description: 1.  Monitor for areas of redness and/or skin breakdown  2.  Assess vascular access sites hourly  3.  Every 4-6 hours minimum:  Change oxygen saturation probe site  4.  Every 4-6 hours:  If on nasal continuous positive airway pressure, respiratory therapy assess nares and determine need for appliance change or resting period.  Outcome: Progressing     Problem: Safety - Adult  Goal: Free from fall injury  Outcome: Progressing     Problem: ABCDS Injury Assessment  Goal: Absence of physical injury  Outcome: Progressing     Problem: Chronic Conditions and Co-morbidities  Goal: Patient's chronic conditions and co-morbidity symptoms are monitored and maintained or improved  Outcome: Progressing     Problem: Respiratory - Adult  Goal: Achieves optimal ventilation and oxygenation  5/1/2024 0332 by Joaquin Alvarenga RN  Outcome: Progressing  4/30/2024 2115 by Jo Ann Oakes RCP  Outcome: Progressing     Problem: Pain  Goal: Verbalizes/displays adequate comfort level or baseline comfort level  Outcome: Progressing

## 2024-05-01 NOTE — PLAN OF CARE
Problem: Discharge Planning  Goal: Discharge to home or other facility with appropriate resources  Outcome: Progressing     Problem: Skin/Tissue Integrity  Goal: Absence of new skin breakdown  Description: 1.  Monitor for areas of redness and/or skin breakdown  2.  Assess vascular access sites hourly  3.  Every 4-6 hours minimum:  Change oxygen saturation probe site  4.  Every 4-6 hours:  If on nasal continuous positive airway pressure, respiratory therapy assess nares and determine need for appliance change or resting period.  Outcome: Progressing     Problem: Safety - Adult  Goal: Free from fall injury  Outcome: Progressing     Problem: ABCDS Injury Assessment  Goal: Absence of physical injury  Outcome: Progressing     Problem: Chronic Conditions and Co-morbidities  Goal: Patient's chronic conditions and co-morbidity symptoms are monitored and maintained or improved  Outcome: Progressing     Problem: Respiratory - Adult  Goal: Achieves optimal ventilation and oxygenation  5/1/2024 1934 by Kristina Leos, RN  Outcome: Progressing  5/1/2024 0922 by Jyotsna Valentino, RT  Outcome: Progressing     Problem: Pain  Goal: Verbalizes/displays adequate comfort level or baseline comfort level  Outcome: Progressing

## 2024-05-01 NOTE — CARE COORDINATION
CM note: spoke briefly with  and patient. He agreed to go back to [facility] when discharged and follow up with [facility] when discharged from Rehab. Patient was a little confused. When asked how long she was in rehab she said \"15 years\". Referral placed for [facility].    English Melchor ALARCON CM   [id number]

## 2024-05-01 NOTE — OP NOTE
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    PROCEDURES PERFORMED:       1. Left ankle excisional debridement of bone, soft tissue, tendon, muscle.     2. Left ankle removal of hardware.     3. Left ankle closed reduction and splinting.    SURGEON:  Edgardo Farnsworth DO    ASSISTANT:  [facility] staff.    ANESTHESIA:  MAC with a nerve block.    ESTIMATED BLOOD LOSS:  Less than 50 mL.    SPECIMENS REMOVED:  Left ankle deep wound cultures sent for analysis.    INTRAOPERATIVE FINDINGS:  infxn     COMPLICATIONS:  None.    IMPLANTS:  None.    INDICATIONS:  The patient is a 73-year-old female who underwent ORIF of left ankle about a month ago.  She has multiple medical comorbidities including advanced neuropathy of the legs and cellulitis.  She was known to be high risk for surgical treatment.  She was found to have MRSA bacteremia soon after surgical treatment.  She did have some drainage from her wound.  X-rays slowly over time showed movement of the plate and fracture indicating a nonunion and hardware failure.  We hoped that wound care and IV antibiotics would resolve infection and allow for some healing of her fracture site.  However, as she continued to worsen, we discussed further treatment options.  Risks and benefits of left ankle removal of hardware with excisional debridement was explained.  We discussed risks of continued infection, blood loss, neurovascular injury, anesthesia risk, and risks

## 2024-05-01 NOTE — PROGRESS NOTES
Name: Marilee Oakes   MRN: [id number]  : 1950                                                           Assessment:            HILARY on CKD stage IIIb  Acute kidney injury grade 2 KDIGO classification  Urinary incontinence  ?  Urosepsis-positive for yeast; recurrent UTI  Chronic anemia  AE of COPD-mild  CHF with ICD  Afib with watchman device  L ankle fracture- s/p ORIF  MRSA wound infection- Bcx at [facility] for MRSA PTA Discussion:      Creatinine worsening (suspect ATN from infection/UTI/Afib + CRS - ?vanc)    UCPR 2.2 grams  CT A/P with no hydro  Known to have baseline CKD stage IIIb ,  lymphedema and type 2 diabetes without complications  Follows with Dr. Evangelista-baseline creatinine 1.3-1.7  No high residual on bladder scan         Plan:   Lasix on hold  Off of vanc  Antibiotics per ID  Monitor UOP  AM labs    I talked with the patient and her .  We talked about the worsening creatinine.  I told them that there is no acute need for dialysis at this point but that HD is a possibility.   I told them that if she required dialysis I expect that it would be short term.    Discussed with patient and .      Subjective:    Confused.    Exam:  BP (!) 124/96   Pulse 80   Temp 97.6 °F (36.4 °C) (Axillary)   Resp 25   Wt 110 kg (242 lb 8.1 oz)   SpO2 97%   BMI 35.81 kg/m²     WB/WN, NAD  rhonchi  Irregular rhythm, distant  Trace edema      Labs/Data:    Lab Results   Component Value Date    WBC 4.5 2024    HGB 7.5 (L) 2024    HCT 27.9 (L) 2024    MCV 90.9 2024     2024       Lab Results   Component Value Date/Time     2024 02:46 AM    K 4.3 2024 02:46 AM     2024 02:46 AM    CO2 22 2024 02:46 AM    BUN 69 2024 02:46 AM    CREATININE 4.35 2024 02:46 AM    GLUCOSE 229

## 2024-05-01 NOTE — ANESTHESIA POSTPROCEDURE EVALUATION
Department of Anesthesiology  Postprocedure Note    Patient: Marilee Oakes  MRN: [id number]  YOB: 1950  Date of evaluation: 4/30/2024    Procedure Summary       Date: 04/30/24 Room / Location: [facility] MA[facility] OR M3 / [facility] MA[facility] OR    Anesthesia Start: 1658 Anesthesia Stop: 1825    Procedure: ANKLE INCISION AND DRAINAGE WITH HARDWARE REMOVAL (Left: Ankle) Diagnosis:       Ankle abscess      (Ankle abscess [L02.419])    Providers: Edgardo Farnsworth DO Responsible Provider: Chin Godoy MD    Anesthesia Type: MAC, regional ASA Status: 4            Anesthesia Type: No value filed.    Amol Phase I: Amol Score: 9    Amol Phase II:      Anesthesia Post Evaluation    Patient location during evaluation: PACU  Patient participation: complete - patient participated  Level of consciousness: sleepy but conscious and responsive to verbal stimuli  Airway patency: patent  Nausea & Vomiting: no vomiting and no nausea  Cardiovascular status: blood pressure returned to baseline and hemodynamically stable  Respiratory status: acceptable  Hydration status: stable  Pain management: adequate    No notable events documented.

## 2024-05-01 NOTE — PLAN OF CARE
Problem: Respiratory - Adult  Goal: Achieves optimal ventilation and oxygenation  4/30/2024 2115 by Jo Ann Oakes RCP  Outcome: Progressing  4/30/2024 1219 by Zabrina Garay RT  Outcome: Progressing  Flowsheets (Taken 4/30/2024 0930 by Vera Richardson RN)  Achieves optimal ventilation and oxygenation:   Assess for changes in mentation and behavior   Assess for changes in respiratory status   Position to facilitate oxygenation and minimize respiratory effort

## 2024-05-01 NOTE — PROGRESS NOTES
Pod 1 hardware removal ankle  Pain well managed  Vac with amount  small yellow output    /61   Pulse 91   Temp 98 °F (36.7 °C) (Oral)   Resp 26   Wt 110 kg (242 lb 8.1 oz)   SpO2 97%   BMI 35.81 kg/m²      Dressing clean and dry    Elevate when in bed  Antibiotics per ID

## 2024-05-01 NOTE — PROGRESS NOTES
Hospitalist Progress Note    NAME:   Marilee Oakes   : 1950   MRN: [id number]     Date/Time: 2024 3:23 PM  Patient PCP: No primary care provider on file.    Estimated discharge date:   Barriers: recovery after Orthopedic procedure, renal improvement, final antibiotic plan, Patient and family adamantly do not want patient to go back to [facility]      Assessment / Plan:    MRSA wound infection  Recurrent UTI  Left ankle fracture s/p ORIF  Blood cultures taken from [facility] were positive for MRSA per MD at [facility], prompting prior admission. No blood cultures here are positive. Reportedly had rash with Daptomycin and now renal failure with Vancomycin.  Left leg bandaged.  Patient is afebrile, white count is normal, procalcitonin is negative.  - ID consulted, appreciate assistance  - Ceftaroline  - PICC line is present  - Ortho consulted, appreciate assistance  Status post surgery on   1. Left ankle excisional debridement of bone, soft tissue, tendon, muscle.     2. Left ankle removal of hardware.     3. Left ankle closed reduction and splinting.    ID initially recommended ceftaroline, switch to Zyvox  Awaiting final ID recommendations    CHF with ICD - mild exacerbation, improving  CAD  A-fib with Watchman device  Continue with aspirin, Imdur, Coreg.  - BNP elevated, coarse lung sounds  - cpap overnight  - holding lasix with renal function declining    Acute on chronic renal failure   CKD 3 with baseline creatinine between 1.2-1.6.  Monitor patient telemetry.  Hold Bumex.  - lasix as above  Nephrology following, Cr uptrending, no need for HD    COPD - exacerbation, improving  Chronic respiratory failure - 2L at baseline  - scheduled duonebs  - weaning steroids  - back to 2L which is her basline    Toxic metabolic encephalopathy - improving  Patient has been lethargic.  CT head is negative.  Continue to monitor in telemetry with neurochecks every 4 hours.  Treat underlying cause.  - waxing

## 2024-05-02 ENCOUNTER — APPOINTMENT (OUTPATIENT)
Facility: HOSPITAL | Age: 74
End: 2024-05-02
Payer: MEDICARE

## 2024-05-02 PROBLEM — R73.9 STEROID-INDUCED HYPERGLYCEMIA: Status: ACTIVE | Noted: 2024-05-02

## 2024-05-02 PROBLEM — T38.0X5A STEROID-INDUCED HYPERGLYCEMIA: Status: ACTIVE | Noted: 2024-05-02

## 2024-05-02 LAB
ANION GAP SERPL CALC-SCNC: 7 MMOL/L (ref 5–15)
BASOPHILS # BLD: 0 K/UL (ref 0–0.1)
BASOPHILS NFR BLD: 0 % (ref 0–1)
BUN SERPL-MCNC: 80 MG/DL (ref 6–20)
BUN/CREAT SERPL: 17 (ref 12–20)
CALCIUM SERPL-MCNC: 7.9 MG/DL (ref 8.5–10.1)
CHLORIDE SERPL-SCNC: 109 MMOL/L (ref 97–108)
CO2 SERPL-SCNC: 23 MMOL/L (ref 21–32)
CREAT SERPL-MCNC: 4.64 MG/DL (ref 0.55–1.02)
DIFFERENTIAL METHOD BLD: ABNORMAL
EOSINOPHIL # BLD: 0 K/UL (ref 0–0.4)
EOSINOPHIL NFR BLD: 0 % (ref 0–7)
ERYTHROCYTE [DISTWIDTH] IN BLOOD BY AUTOMATED COUNT: 18.5 % (ref 11.5–14.5)
GLUCOSE BLD STRIP.AUTO-MCNC: 202 MG/DL (ref 65–117)
GLUCOSE BLD STRIP.AUTO-MCNC: 226 MG/DL (ref 65–117)
GLUCOSE BLD STRIP.AUTO-MCNC: 294 MG/DL (ref 65–117)
GLUCOSE BLD STRIP.AUTO-MCNC: 378 MG/DL (ref 65–117)
GLUCOSE BLD STRIP.AUTO-MCNC: 386 MG/DL (ref 65–117)
GLUCOSE BLD STRIP.AUTO-MCNC: 390 MG/DL (ref 65–117)
GLUCOSE SERPL-MCNC: 219 MG/DL (ref 65–100)
HCT VFR BLD AUTO: 26.5 % (ref 35–47)
HGB BLD-MCNC: 7.3 G/DL (ref 11.5–16)
IMM GRANULOCYTES # BLD AUTO: 0 K/UL (ref 0–0.04)
IMM GRANULOCYTES NFR BLD AUTO: 0 % (ref 0–0.5)
LYMPHOCYTES # BLD: 0.1 K/UL (ref 0.8–3.5)
LYMPHOCYTES NFR BLD: 4 % (ref 12–49)
MAGNESIUM SERPL-MCNC: 1.8 MG/DL (ref 1.6–2.4)
MCH RBC QN AUTO: 24.7 PG (ref 26–34)
MCHC RBC AUTO-ENTMCNC: 27.5 G/DL (ref 30–36.5)
MCV RBC AUTO: 89.5 FL (ref 80–99)
MONOCYTES # BLD: 0 K/UL (ref 0–1)
MONOCYTES NFR BLD: 1 % (ref 5–13)
NEUTS SEG # BLD: 3.1 K/UL (ref 1.8–8)
NEUTS SEG NFR BLD: 95 % (ref 32–75)
NRBC # BLD: 0.02 K/UL (ref 0–0.01)
NRBC BLD-RTO: 0.6 PER 100 WBC
PHOSPHATE SERPL-MCNC: 7.3 MG/DL (ref 2.6–4.7)
PLATELET # BLD AUTO: 143 K/UL (ref 150–400)
PMV BLD AUTO: 11 FL (ref 8.9–12.9)
POTASSIUM SERPL-SCNC: 4.3 MMOL/L (ref 3.5–5.1)
RBC # BLD AUTO: 2.96 M/UL (ref 3.8–5.2)
RBC MORPH BLD: ABNORMAL
RBC MORPH BLD: ABNORMAL
SERVICE CMNT-IMP: ABNORMAL
SODIUM SERPL-SCNC: 139 MMOL/L (ref 136–145)
WBC # BLD AUTO: 3.2 K/UL (ref 3.6–11)

## 2024-05-02 PROCEDURE — 2709999900 IR NONTUNNELED VASCULAR CATHETER > 5 YEARS

## 2024-05-02 PROCEDURE — 87340 HEPATITIS B SURFACE AG IA: CPT

## 2024-05-02 PROCEDURE — 2500000003 HC RX 250 WO HCPCS: Performed by: RADIOLOGY

## 2024-05-02 PROCEDURE — 80048 BASIC METABOLIC PNL TOTAL CA: CPT

## 2024-05-02 PROCEDURE — 6370000000 HC RX 637 (ALT 250 FOR IP): Performed by: STUDENT IN AN ORGANIZED HEALTH CARE EDUCATION/TRAINING PROGRAM

## 2024-05-02 PROCEDURE — 6360000002 HC RX W HCPCS: Performed by: STUDENT IN AN ORGANIZED HEALTH CARE EDUCATION/TRAINING PROGRAM

## 2024-05-02 PROCEDURE — 86704 HEP B CORE ANTIBODY TOTAL: CPT

## 2024-05-02 PROCEDURE — 83735 ASSAY OF MAGNESIUM: CPT

## 2024-05-02 PROCEDURE — 2700000000 HC OXYGEN THERAPY PER DAY

## 2024-05-02 PROCEDURE — 2580000003 HC RX 258: Performed by: ANESTHESIOLOGY

## 2024-05-02 PROCEDURE — 84100 ASSAY OF PHOSPHORUS: CPT

## 2024-05-02 PROCEDURE — 6370000000 HC RX 637 (ALT 250 FOR IP)

## 2024-05-02 PROCEDURE — 36415 COLL VENOUS BLD VENIPUNCTURE: CPT

## 2024-05-02 PROCEDURE — 6370000000 HC RX 637 (ALT 250 FOR IP): Performed by: INTERNAL MEDICINE

## 2024-05-02 PROCEDURE — 71045 X-RAY EXAM CHEST 1 VIEW: CPT

## 2024-05-02 PROCEDURE — 6360000002 HC RX W HCPCS: Performed by: INTERNAL MEDICINE

## 2024-05-02 PROCEDURE — 86706 HEP B SURFACE ANTIBODY: CPT

## 2024-05-02 PROCEDURE — 85025 COMPLETE CBC W/AUTO DIFF WBC: CPT

## 2024-05-02 PROCEDURE — 99221 1ST HOSP IP/OBS SF/LOW 40: CPT

## 2024-05-02 PROCEDURE — 2580000003 HC RX 258: Performed by: INTERNAL MEDICINE

## 2024-05-02 PROCEDURE — 6360000002 HC RX W HCPCS

## 2024-05-02 PROCEDURE — 2060000000 HC ICU INTERMEDIATE R&B

## 2024-05-02 PROCEDURE — 2580000003 HC RX 258: Performed by: STUDENT IN AN ORGANIZED HEALTH CARE EDUCATION/TRAINING PROGRAM

## 2024-05-02 PROCEDURE — 02H633Z INSERTION OF INFUSION DEVICE INTO RIGHT ATRIUM, PERCUTANEOUS APPROACH: ICD-10-PCS | Performed by: RADIOLOGY

## 2024-05-02 PROCEDURE — 5A1D70Z PERFORMANCE OF URINARY FILTRATION, INTERMITTENT, LESS THAN 6 HOURS PER DAY: ICD-10-PCS | Performed by: INTERNAL MEDICINE

## 2024-05-02 PROCEDURE — 6360000002 HC RX W HCPCS: Performed by: RADIOLOGY

## 2024-05-02 PROCEDURE — 94640 AIRWAY INHALATION TREATMENT: CPT

## 2024-05-02 PROCEDURE — 2580000003 HC RX 258

## 2024-05-02 PROCEDURE — 82962 GLUCOSE BLOOD TEST: CPT

## 2024-05-02 PROCEDURE — 90935 HEMODIALYSIS ONE EVALUATION: CPT

## 2024-05-02 RX ORDER — LIDOCAINE HYDROCHLORIDE 20 MG/ML
20 INJECTION, SOLUTION INFILTRATION; PERINEURAL ONCE
Status: COMPLETED | OUTPATIENT
Start: 2024-05-02 | End: 2024-05-02

## 2024-05-02 RX ORDER — HEPARIN 100 UNIT/ML
300 SYRINGE INTRAVENOUS ONCE
Status: COMPLETED | OUTPATIENT
Start: 2024-05-02 | End: 2024-05-02

## 2024-05-02 RX ORDER — HEPARIN SODIUM 200 [USP'U]/100ML
200 INJECTION, SOLUTION INTRAVENOUS ONCE
Status: DISCONTINUED | OUTPATIENT
Start: 2024-05-02 | End: 2024-05-10 | Stop reason: HOSPADM

## 2024-05-02 RX ADMIN — ARFORMOTEROL TARTRATE: 15 SOLUTION RESPIRATORY (INHALATION) at 20:14

## 2024-05-02 RX ADMIN — LIDOCAINE HYDROCHLORIDE 5 ML: 20 INJECTION, SOLUTION INFILTRATION; PERINEURAL at 14:17

## 2024-05-02 RX ADMIN — INSULIN HUMAN 11 UNITS: 100 INJECTION, SUSPENSION SUBCUTANEOUS at 21:02

## 2024-05-02 RX ADMIN — Medication 300 UNITS: at 14:17

## 2024-05-02 RX ADMIN — IPRATROPIUM BROMIDE AND ALBUTEROL SULFATE 1 DOSE: .5; 3 SOLUTION RESPIRATORY (INHALATION) at 20:14

## 2024-05-02 RX ADMIN — SODIUM CHLORIDE, PRESERVATIVE FREE 10 ML: 5 INJECTION INTRAVENOUS at 21:06

## 2024-05-02 RX ADMIN — ISOSORBIDE MONONITRATE 60 MG: 30 TABLET, EXTENDED RELEASE ORAL at 08:35

## 2024-05-02 RX ADMIN — LINEZOLID 600 MG: 600 INJECTION, SOLUTION INTRAVENOUS at 08:44

## 2024-05-02 RX ADMIN — ASPIRIN 81 MG: 81 TABLET, CHEWABLE ORAL at 08:35

## 2024-05-02 RX ADMIN — LINEZOLID 600 MG: 600 INJECTION, SOLUTION INTRAVENOUS at 21:12

## 2024-05-02 RX ADMIN — Medication 8 UNITS: at 12:01

## 2024-05-02 RX ADMIN — INSULIN HUMAN 11 UNITS: 100 INJECTION, SUSPENSION SUBCUTANEOUS at 12:01

## 2024-05-02 RX ADMIN — MELATONIN 3 MG: at 21:06

## 2024-05-02 RX ADMIN — SODIUM CHLORIDE 5 ML: 9 INJECTION, SOLUTION INTRAVENOUS at 21:12

## 2024-05-02 RX ADMIN — CLONAZEPAM 0.5 MG: 0.5 TABLET ORAL at 21:06

## 2024-05-02 RX ADMIN — CARVEDILOL 25 MG: 12.5 TABLET, FILM COATED ORAL at 08:35

## 2024-05-02 RX ADMIN — PANTOPRAZOLE SODIUM 40 MG: 40 TABLET, DELAYED RELEASE ORAL at 05:49

## 2024-05-02 RX ADMIN — IPRATROPIUM BROMIDE AND ALBUTEROL SULFATE 1 DOSE: .5; 3 SOLUTION RESPIRATORY (INHALATION) at 09:24

## 2024-05-02 RX ADMIN — SODIUM CHLORIDE, PRESERVATIVE FREE 10 ML: 5 INJECTION INTRAVENOUS at 08:36

## 2024-05-02 RX ADMIN — WATER 40 MG: 1 INJECTION INTRAMUSCULAR; INTRAVENOUS; SUBCUTANEOUS at 21:02

## 2024-05-02 RX ADMIN — WATER 60 MG: 1 INJECTION INTRAMUSCULAR; INTRAVENOUS; SUBCUTANEOUS at 05:48

## 2024-05-02 RX ADMIN — WATER 40 MG: 1 INJECTION INTRAMUSCULAR; INTRAVENOUS; SUBCUTANEOUS at 12:02

## 2024-05-02 RX ADMIN — ARFORMOTEROL TARTRATE: 15 SOLUTION RESPIRATORY (INHALATION) at 09:30

## 2024-05-02 RX ADMIN — Medication 2 UNITS: at 08:34

## 2024-05-02 RX ADMIN — ENOXAPARIN SODIUM 30 MG: 100 INJECTION SUBCUTANEOUS at 12:01

## 2024-05-02 ASSESSMENT — PAIN SCALES - GENERAL
PAINLEVEL_OUTOF10: 0

## 2024-05-02 NOTE — PROCEDURES
Placed patient's jenifer catheter without complication. Post chest x ray completed with catheter in correct spot.     Spoke with dialysis to notify them of the placement.     Gave bedside report to patient's primary nurse.

## 2024-05-02 NOTE — PROGRESS NOTES
Occupational Therapy    Attempted to see patient for OT treatment, but she was just transported off the floor for HD. Will follow back tomorrow to attempt OT treatment.    Tom Vargas, OTR/L

## 2024-05-02 NOTE — DIABETES MGMT
[facility]  DIABETES MANAGEMENT CONSULT    Consulted by Provider for advanced nursing evaluation and care for inpatient blood glucose management.    Evaluation and Action Plan   Marilee Oakes is a 73 year old female patient with diet controlled Type 2 diabetes. Her current A1c is 5.8%. The patient took Metformin in the past but is was d/c'd by her PCP since BG's have been very well controlled. The patient's admission BG was 115.  She has since started on methylprednisolone and BG's are steadily rising. Infection and steroid use are likely primary culprits of BG elevation. The patient will require insulin to help override effects of the steroid. I suspect she will not require insulin once steroid dosing is complete.     Management Rationale Action Plan   Medication   Corrective insulin Using medium dose sensitivity based on weight    Overriding steroid effect Using 0.1 units/kg/D based on weight 0.1 x kg insulin with  40 mg methylprednisolone    Additional orders          Diabetes Discharge Plan   Medication  TBD   Additional orders            Initial Presentation   Marilee Oakes is a 73 y.o. female admitted  after experiencing complications from ankle surgery. The patient reportedly fractured ankle on March 22, 2024. The patient reportedly had hardware place during the surgery.Positive for MRSA.   LAB: Glucose 115. Creatine 2.59. A1c 5.8%.   CXR:  CT:Narrative & Impression  EXAM: CT HEAD WO CONTRAST     INDICATION: altered level of consciousness     COMPARISON: CT head without contrast from 5/19/2021.     TECHNIQUE: Unenhanced CT of the head was performed using 5 mm images. Brain and  bone windows were generated.  CT dose reduction was achieved through use of a  standardized protocol tailored for this examination and automatic exposure  control for dose modulation.      FINDINGS:  Generalized volume loss. The ventricles and sulci are normal in size, shape and  configuration and

## 2024-05-02 NOTE — FLOWSHEET NOTE
Primary RN SBAR: Kristina Leos RN  Comments: Tolerated treatment well.      05/02/24 1900   Vital Signs   /81   Temp 97.2 °F (36.2 °C)   Pulse 80   Respirations 18   Pain Assessment   Pain Assessment None - Denies Pain   Pain Level 0   Post-Hemodialysis Assessment   Post-Treatment Procedures Blood returned;Catheter Capped, clamped with Saline x2 ports   Machine Disinfection Process Acid/Vinegar Clean;Bleach;Exterior Machine Disinfection   Rinseback Volume (ml) 300 ml   Blood Volume Processed (Liters) 34.8 L   Dialyzer Clearance Clear   Duration of Treatment (minutes)   (2 hours)   Hemodialysis Intake (ml) 500 ml   Hemodialysis Output (ml) 2500 ml   NET Removed (ml) 2000   Tolerated Treatment Good   Patient Response to Treatment Tolerated well   Bilateral Breath Sounds Rhonchi   RLE Edema Non-pitting   LLE Edema Non-pitting   Time Off 1848   Patient Disposition Return to room   Observations & Evaluations   Level of Consciousness 0   Oriented X x4  (intermittent confusion)   Heart Rhythm Regular   Respiratory Quality/Effort Unlabored   O2 Device Nasal cannula   Skin Color Pale   Skin Condition/Temp Dry;Warm   Appetite Fair   Abdomen Inspection Obese;Soft   Bowel Sounds (All Quadrants) Active        05/02/24 1900   Hemodialysis Central Access Right Neck   Placement Date: 05/02/24   Present on Admission/Arrival: No  Orientation: Right  Access Location: Neck   Site Assessment Clean, dry & intact   CVC Lumen Status Flushed;Normal saline locked   Venous Lumen Status Flushed   Arterial Lumen Status Flushed   Line Care Cap changed   Dressing Status Clean, dry & intact

## 2024-05-02 NOTE — PROGRESS NOTES
Name: Marilee Oakes   MRN: [id number]  : 1950                                                           Assessment:            HILARY on CKD stage IIIb  Acute kidney injury grade 2 KDIGO classification  Urinary incontinence  ?  Urosepsis-positive for yeast; recurrent UTI  Chronic anemia  AE of COPD-mild  CHF with ICD  Afib with watchman device  L ankle fracture- s/p ORIF  MRSA wound infection- Bcx at [facility] for MRSA PTA Discussion:      Creatinine worsening (suspect ATN from infection/UTI/Afib + CRS )    UCPR 2.2 grams  CT A/P with no hydro  Known to have baseline CKD stage IIIb ,  lymphedema and type 2 diabetes without complications  Follows with Dr. Evangelista-baseline creatinine 1.3-1.7  No high residual on bladder scan         Plan:   Lasix on hold  Off of vanc  Antibiotics per ID  Monitor UOP  AM labs    I talked with the patient and her .  We talked about the worsening creatinine.  I told them that we should go ahead and start dialysis.  I talked about the dialysis procedure and placement of the catheter.  They both understand and agree to proceed.   I told them that  I would expect that it would be short term.    Discussed with patient and  and RN.      Subjective:    Confused.   at bedside.    Exam:  /82   Pulse 80   Temp 96.9 °F (36.1 °C) (Axillary)   Resp 18   Wt 110 kg (242 lb 8.1 oz)   SpO2 94%   BMI 35.81 kg/m²     WB/WN, NAD  rhonchi  Irregular rhythm, distant  Trace edema      Labs/Data:    Lab Results   Component Value Date    WBC 3.2 (L) 2024    HGB 7.3 (L) 2024    HCT 26.5 (L) 2024    MCV 89.5 2024     (L) 2024       Lab Results   Component Value Date/Time     2024 02:14 AM    K 4.3 2024 02:14 AM     2024 02:14 AM    CO2 23 2024 02:14 AM    BUN 80

## 2024-05-02 NOTE — FLOWSHEET NOTE
Primary RN SBAR: Kristina Leos RN  Patient Education provided: Ordered Dialysis Treatment  Preferred Education method and Primary language: Verbal; English  Hospital associated wait time; reason: 30 minute transport delay    No results found for: \"HEPBSAG\", \"HEPBSAB\": SENT TO LAB 05/02/24 05/02/24 1640   Vital Signs   /66   Temp 98.4 °F (36.9 °C)   Pulse 80   Respirations 18   Pain Assessment   Pain Assessment None - Denies Pain   Treatment   Time On 1648   Treatment Goal 2000   Observations & Evaluations   Level of Consciousness 0   Oriented X x4  (intermittent confusion)   Heart Rhythm Regular   Respiratory Quality/Effort Unlabored   O2 Device Nasal cannula   Bilateral Breath Sounds Rhonchi   Skin Color Pale   Skin Condition/Temp Dry;Warm   Appetite Fair   Abdomen Inspection Obese;Soft   Bowel Sounds (All Quadrants) Active   RLE Edema Non-pitting   LLE Edema Non-pitting   Technical Checks   Dialysis Machine No. 07   RO Machine Number 0   Dialyzer Lot No. [id number]   Tubing Lot Number [id number]   All Connections Secure Yes   NS Bag Yes   Saline Line Double Clamped Yes   Dialyzer Revaclear 300   Prime Volume (mL) 200 mL   ICEBOAT I;C;E;B;O;A;T   RO Machine Log Sheet Completed Yes   Machine Alarm Self Test Completed;Passed   Air Foam Detector Tested;Proper Function;pH Reading   Extracorporeal Circuit Tested for Integrity Yes   Machine Conductivity 13.8   Bleach Test (Neg) Yes   Bath Temperature 98.6 °F (37 °C)   Dialysis Bath   K+ (Potassium) 2   Ca+ (Calcium) 2.5   Na+ (Sodium) 140   HCO3 (Bicarb) 35   Treatment Initiation   Dialyze Hours 2   Treatment  Initiation Universal Precautions maintained;Lines secured to patient;Connections secured;Prime given;Venous Parameters set;Arterial Parameters set;Air foam detector engaged;Saline line double clamped;Revaclear Dialyzer;REV-300   Access   Add Access? Yes   Hemodialysis Central Access Right Neck   Placement Date: 05/02/24   Present on Admission/Arrival: No

## 2024-05-02 NOTE — PROGRESS NOTES
Chart reviewed. Met with patient in room. She was up in chair eating lunch. Requested PT return after lunch for therapy. Returned about 1355 and patient was on gurney to be transported off the floor to HD. Recommend for out of bed to chair transfers a Rylan be used for patient and staff safety at this time. PT will follow up tomorrow.    Rico Shepherd, PT

## 2024-05-02 NOTE — PLAN OF CARE
Problem: Discharge Planning  Goal: Discharge to home or other facility with appropriate resources  Outcome: Progressing     Problem: Skin/Tissue Integrity  Goal: Absence of new skin breakdown  Description: 1.  Monitor for areas of redness and/or skin breakdown  2.  Assess vascular access sites hourly  3.  Every 4-6 hours minimum:  Change oxygen saturation probe site  4.  Every 4-6 hours:  If on nasal continuous positive airway pressure, respiratory therapy assess nares and determine need for appliance change or resting period.  Outcome: Progressing     Problem: Safety - Adult  Goal: Free from fall injury  Outcome: Progressing     Problem: ABCDS Injury Assessment  Goal: Absence of physical injury  Outcome: Progressing     Problem: Chronic Conditions and Co-morbidities  Goal: Patient's chronic conditions and co-morbidity symptoms are monitored and maintained or improved  Outcome: Progressing     Problem: Respiratory - Adult  Goal: Achieves optimal ventilation and oxygenation  5/2/2024 1738 by Kristina Leos, RN  Outcome: Progressing  5/2/2024 0933 by Marina Wyatt, ZOHRA  Outcome: Progressing     Problem: Pain  Goal: Verbalizes/displays adequate comfort level or baseline comfort level  Outcome: Progressing

## 2024-05-02 NOTE — PROGRESS NOTES
Hospitalist Progress Note    NAME:   Marilee Oakes   : 1950   MRN: [id number]     Date/Time: 2024 4:29 PM  Patient PCP: No primary care provider on file.    Estimated discharge date:   Barriers: recovery after Orthopedic procedure, renal improvement, final antibiotic plan, Patient and family adamantly do not want patient to go back to [facility], started on HD on       Assessment / Plan:    MRSA wound infection  Recurrent UTI  Left ankle fracture s/p ORIF  Blood cultures taken from [facility] were positive for MRSA per MD at [facility], prompting prior admission. No blood cultures here are positive. Reportedly had rash with Daptomycin and now renal failure with Vancomycin.  Left leg bandaged.  Patient is afebrile, white count is normal, procalcitonin is negative.  - ID consulted, appreciate assistance  - Ceftaroline  - PICC line is present  - Ortho consulted, appreciate assistance  Status post surgery on   1. Left ankle excisional debridement of bone, soft tissue, tendon, muscle.     2. Left ankle removal of hardware.     3. Left ankle closed reduction and splinting.    ID initially recommended ceftaroline, switch to Zyvox  Awaiting final ID recommendations, discussed with ID     CHF with ICD - mild exacerbation, improving  CAD  A-fib with Watchman device  Continue with aspirin, Imdur, Coreg.  - BNP elevated, coarse lung sounds  - cpap overnight  - holding lasix with renal function declining    Acute on chronic renal failure   CKD 3 with baseline creatinine between 1.2-1.6.  Monitor patient telemetry.  Hold Bumex.  - lasix as above  Nephrology following, Cr uptrending, plan is to start HD today   Tripp to be placed   Discussed with nephrology     COPD - exacerbation, improving  Chronic respiratory failure - 2L at baseline  - scheduled duonebs  - weaning steroids  - back to 2L which is her basline    Toxic metabolic encephalopathy - improving  Patient has been lethargic.  CT head is    05/02/24 0715 (!) 136/121 97.4 °F (36.3 °C) Oral 84 18 94 %   05/02/24 0600 -- -- -- 80 13 97 %   05/02/24 0250 (!) 139/92 96.9 °F (36.1 °C) Axillary 94 16 97 %   05/01/24 2300 126/88 97.4 °F (36.3 °C) Axillary 91 14 97 %   05/01/24 2230 -- -- -- 80 14 95 %   05/01/24 1920 132/80 97.7 °F (36.5 °C) Oral 81 19 96 %   05/01/24 1818 (!) 149/80 -- -- 80 -- --         No intake or output data in the 24 hours ending 05/02/24 1629       I had a face to face encounter and independently examined this patient on 5/2/2024, as outlined below:  PHYSICAL EXAM:  General: Alert, cooperative  EENT:  EOMI. Anicteric sclerae.  Resp:  CTA bilaterally, no wheezing or rales.  No accessory muscle use  CV:  Regular  rate,  No edema  GI:  Soft, Non distended, Non tender.  +Bowel sounds  Neurologic:  Alert and oriented X 4, more alert, normal speech,   Skin:  No rashes.  No jaundice    Reviewed most current lab test results and cultures  YES  Reviewed most current radiology test results   YES  Review and summation of old records today    NO  Reviewed patient's current orders and MAR    YES  PMH/SH reviewed - no change compared to H&P    Procedures: see electronic medical records for all procedures/Xrays and details which were not copied into this note but were reviewed prior to creation of Plan.      LABS:  I reviewed today's most current labs and imaging studies.  Pertinent labs include:  Recent Labs     04/30/24  0350 05/01/24  0246 05/02/24  0214   WBC 4.4 4.5 3.2*   HGB 7.5* 7.5* 7.3*   HCT 28.0* 27.9* 26.5*    167 143*       Recent Labs     04/30/24  0350 05/01/24  0246 05/02/24  0214    141 139   K 4.0 4.3 4.3   * 112* 109*   CO2 22 22 23   GLUCOSE 225* 229* 219*   BUN 57* 69* 80*   CREATININE 4.11* 4.35* 4.64*   CALCIUM 8.2* 8.2* 7.9*   MG 1.8 1.8 1.8   PHOS 6.5* 7.3* 7.3*         Signed: Marie Lopez MD

## 2024-05-02 NOTE — PLAN OF CARE
Problem: Discharge Planning  Goal: Discharge to home or other facility with appropriate resources  5/1/2024 2343 by Joaquin Alvarenga RN  Outcome: Progressing  5/1/2024 1934 by Kristina Leos RN  Outcome: Progressing     Problem: Skin/Tissue Integrity  Goal: Absence of new skin breakdown  Description: 1.  Monitor for areas of redness and/or skin breakdown  2.  Assess vascular access sites hourly  3.  Every 4-6 hours minimum:  Change oxygen saturation probe site  4.  Every 4-6 hours:  If on nasal continuous positive airway pressure, respiratory therapy assess nares and determine need for appliance change or resting period.  5/1/2024 2343 by Joaquin Alvarenga RN  Outcome: Progressing  5/1/2024 1934 by Kristina Leos RN  Outcome: Progressing     Problem: Safety - Adult  Goal: Free from fall injury  5/1/2024 2343 by Joaquin Alvarenga RN  Outcome: Progressing  5/1/2024 1934 by Kristina Leos RN  Outcome: Progressing     Problem: ABCDS Injury Assessment  Goal: Absence of physical injury  5/1/2024 2343 by Joaquin Alvarenga RN  Outcome: Progressing  5/1/2024 1934 by Kristina Leos RN  Outcome: Progressing     Problem: Chronic Conditions and Co-morbidities  Goal: Patient's chronic conditions and co-morbidity symptoms are monitored and maintained or improved  5/1/2024 2343 by Joaquin Alvarenga RN  Outcome: Progressing  5/1/2024 1934 by Kristina Leos RN  Outcome: Progressing     Problem: Respiratory - Adult  Goal: Achieves optimal ventilation and oxygenation  5/1/2024 2343 by Joaquin Alvarenga RN  Outcome: Progressing  5/1/2024 2005 by Bonnie Cormier RCP  Outcome: Progressing  5/1/2024 1934 by Kristina Leos RN  Outcome: Progressing     Problem: Pain  Goal: Verbalizes/displays adequate comfort level or baseline comfort level  5/1/2024 2343 by Joaquin Alvarenga RN  Outcome: Progressing  5/1/2024 1934 by Kristina Leos RN  Outcome: Progressing

## 2024-05-03 PROBLEM — Z99.2 DIALYSIS PATIENT (HCC): Status: ACTIVE | Noted: 2024-05-03

## 2024-05-03 LAB
ANION GAP SERPL CALC-SCNC: 10 MMOL/L (ref 5–15)
BASOPHILS # BLD: 0 K/UL (ref 0–0.1)
BASOPHILS NFR BLD: 0 % (ref 0–1)
BUN SERPL-MCNC: 63 MG/DL (ref 6–20)
BUN/CREAT SERPL: 18 (ref 12–20)
CALCIUM SERPL-MCNC: 7.9 MG/DL (ref 8.5–10.1)
CHLORIDE SERPL-SCNC: 106 MMOL/L (ref 97–108)
CO2 SERPL-SCNC: 25 MMOL/L (ref 21–32)
CREAT SERPL-MCNC: 3.52 MG/DL (ref 0.55–1.02)
DIFFERENTIAL METHOD BLD: ABNORMAL
EOSINOPHIL # BLD: 0 K/UL (ref 0–0.4)
EOSINOPHIL NFR BLD: 0 % (ref 0–7)
ERYTHROCYTE [DISTWIDTH] IN BLOOD BY AUTOMATED COUNT: 18.3 % (ref 11.5–14.5)
ERYTHROCYTE [SEDIMENTATION RATE] IN BLOOD: 18 MM/HR (ref 0–30)
GLUCOSE BLD STRIP.AUTO-MCNC: 142 MG/DL (ref 65–117)
GLUCOSE BLD STRIP.AUTO-MCNC: 157 MG/DL (ref 65–117)
GLUCOSE BLD STRIP.AUTO-MCNC: 162 MG/DL (ref 65–117)
GLUCOSE BLD STRIP.AUTO-MCNC: 222 MG/DL (ref 65–117)
GLUCOSE SERPL-MCNC: 187 MG/DL (ref 65–100)
HBV SURFACE AB SER QL: NONREACTIVE
HBV SURFACE AB SER-ACNC: <3.1 MIU/ML
HBV SURFACE AG SER QL: <0.1 INDEX
HBV SURFACE AG SER QL: NEGATIVE
HCT VFR BLD AUTO: 28.6 % (ref 35–47)
HGB BLD-MCNC: 8.2 G/DL (ref 11.5–16)
IMM GRANULOCYTES # BLD AUTO: 0.1 K/UL (ref 0–0.04)
IMM GRANULOCYTES NFR BLD AUTO: 1 % (ref 0–0.5)
LYMPHOCYTES # BLD: 0.3 K/UL (ref 0.8–3.5)
LYMPHOCYTES NFR BLD: 6 % (ref 12–49)
MAGNESIUM SERPL-MCNC: 1.9 MG/DL (ref 1.6–2.4)
MCH RBC QN AUTO: 24.8 PG (ref 26–34)
MCHC RBC AUTO-ENTMCNC: 28.7 G/DL (ref 30–36.5)
MCV RBC AUTO: 86.7 FL (ref 80–99)
MONOCYTES # BLD: 0.2 K/UL (ref 0–1)
MONOCYTES NFR BLD: 4 % (ref 5–13)
NEUTS SEG # BLD: 4.9 K/UL (ref 1.8–8)
NEUTS SEG NFR BLD: 89 % (ref 32–75)
NRBC # BLD: 0.02 K/UL (ref 0–0.01)
NRBC BLD-RTO: 0.4 PER 100 WBC
PHOSPHATE SERPL-MCNC: 5.6 MG/DL (ref 2.6–4.7)
PLATELET # BLD AUTO: 157 K/UL (ref 150–400)
PMV BLD AUTO: 10.9 FL (ref 8.9–12.9)
POTASSIUM SERPL-SCNC: 3.9 MMOL/L (ref 3.5–5.1)
RBC # BLD AUTO: 3.3 M/UL (ref 3.8–5.2)
RBC MORPH BLD: ABNORMAL
RBC MORPH BLD: ABNORMAL
SERVICE CMNT-IMP: ABNORMAL
SODIUM SERPL-SCNC: 141 MMOL/L (ref 136–145)
WBC # BLD AUTO: 5.5 K/UL (ref 3.6–11)

## 2024-05-03 PROCEDURE — 6370000000 HC RX 637 (ALT 250 FOR IP)

## 2024-05-03 PROCEDURE — 97530 THERAPEUTIC ACTIVITIES: CPT

## 2024-05-03 PROCEDURE — 94640 AIRWAY INHALATION TREATMENT: CPT

## 2024-05-03 PROCEDURE — 6360000002 HC RX W HCPCS: Performed by: INTERNAL MEDICINE

## 2024-05-03 PROCEDURE — 84100 ASSAY OF PHOSPHORUS: CPT

## 2024-05-03 PROCEDURE — 2580000003 HC RX 258: Performed by: ANESTHESIOLOGY

## 2024-05-03 PROCEDURE — 80048 BASIC METABOLIC PNL TOTAL CA: CPT

## 2024-05-03 PROCEDURE — 82962 GLUCOSE BLOOD TEST: CPT

## 2024-05-03 PROCEDURE — 85025 COMPLETE CBC W/AUTO DIFF WBC: CPT

## 2024-05-03 PROCEDURE — 2060000000 HC ICU INTERMEDIATE R&B

## 2024-05-03 PROCEDURE — 2580000003 HC RX 258: Performed by: INTERNAL MEDICINE

## 2024-05-03 PROCEDURE — 97535 SELF CARE MNGMENT TRAINING: CPT | Performed by: OCCUPATIONAL THERAPIST

## 2024-05-03 PROCEDURE — 6370000000 HC RX 637 (ALT 250 FOR IP): Performed by: STUDENT IN AN ORGANIZED HEALTH CARE EDUCATION/TRAINING PROGRAM

## 2024-05-03 PROCEDURE — 99231 SBSQ HOSP IP/OBS SF/LOW 25: CPT

## 2024-05-03 PROCEDURE — 97110 THERAPEUTIC EXERCISES: CPT

## 2024-05-03 PROCEDURE — 36415 COLL VENOUS BLD VENIPUNCTURE: CPT

## 2024-05-03 PROCEDURE — 6370000000 HC RX 637 (ALT 250 FOR IP): Performed by: INTERNAL MEDICINE

## 2024-05-03 PROCEDURE — 90935 HEMODIALYSIS ONE EVALUATION: CPT

## 2024-05-03 PROCEDURE — 83735 ASSAY OF MAGNESIUM: CPT

## 2024-05-03 PROCEDURE — 86140 C-REACTIVE PROTEIN: CPT

## 2024-05-03 PROCEDURE — 85652 RBC SED RATE AUTOMATED: CPT

## 2024-05-03 PROCEDURE — 6360000002 HC RX W HCPCS

## 2024-05-03 PROCEDURE — 2700000000 HC OXYGEN THERAPY PER DAY

## 2024-05-03 PROCEDURE — 94660 CPAP INITIATION&MGMT: CPT

## 2024-05-03 PROCEDURE — 2580000003 HC RX 258

## 2024-05-03 PROCEDURE — 97530 THERAPEUTIC ACTIVITIES: CPT | Performed by: OCCUPATIONAL THERAPIST

## 2024-05-03 PROCEDURE — 99233 SBSQ HOSP IP/OBS HIGH 50: CPT | Performed by: INTERNAL MEDICINE

## 2024-05-03 RX ORDER — HEPARIN SODIUM 5000 [USP'U]/ML
5000 INJECTION, SOLUTION INTRAVENOUS; SUBCUTANEOUS EVERY 8 HOURS SCHEDULED
Status: DISCONTINUED | OUTPATIENT
Start: 2024-05-04 | End: 2024-05-10 | Stop reason: HOSPADM

## 2024-05-03 RX ADMIN — PREGABALIN 150 MG: 75 CAPSULE ORAL at 21:13

## 2024-05-03 RX ADMIN — CARVEDILOL 25 MG: 12.5 TABLET, FILM COATED ORAL at 09:08

## 2024-05-03 RX ADMIN — PANTOPRAZOLE SODIUM 40 MG: 40 TABLET, DELAYED RELEASE ORAL at 09:09

## 2024-05-03 RX ADMIN — ASPIRIN 81 MG: 81 TABLET, CHEWABLE ORAL at 09:09

## 2024-05-03 RX ADMIN — CLONAZEPAM 0.5 MG: 0.5 TABLET ORAL at 21:12

## 2024-05-03 RX ADMIN — WATER 40 MG: 1 INJECTION INTRAMUSCULAR; INTRAVENOUS; SUBCUTANEOUS at 04:16

## 2024-05-03 RX ADMIN — ARFORMOTEROL TARTRATE: 15 SOLUTION RESPIRATORY (INHALATION) at 08:35

## 2024-05-03 RX ADMIN — ENOXAPARIN SODIUM 30 MG: 100 INJECTION SUBCUTANEOUS at 09:08

## 2024-05-03 RX ADMIN — WATER 40 MG: 1 INJECTION INTRAMUSCULAR; INTRAVENOUS; SUBCUTANEOUS at 11:58

## 2024-05-03 RX ADMIN — ARFORMOTEROL TARTRATE: 15 SOLUTION RESPIRATORY (INHALATION) at 20:39

## 2024-05-03 RX ADMIN — INSULIN HUMAN 11 UNITS: 100 INJECTION, SUSPENSION SUBCUTANEOUS at 04:16

## 2024-05-03 RX ADMIN — SODIUM CHLORIDE, PRESERVATIVE FREE 10 ML: 5 INJECTION INTRAVENOUS at 21:13

## 2024-05-03 RX ADMIN — CARVEDILOL 25 MG: 12.5 TABLET, FILM COATED ORAL at 17:58

## 2024-05-03 RX ADMIN — ISOSORBIDE MONONITRATE 60 MG: 30 TABLET, EXTENDED RELEASE ORAL at 09:09

## 2024-05-03 RX ADMIN — INSULIN HUMAN 11 UNITS: 100 INJECTION, SUSPENSION SUBCUTANEOUS at 21:01

## 2024-05-03 RX ADMIN — LINEZOLID 600 MG: 600 INJECTION, SOLUTION INTRAVENOUS at 09:13

## 2024-05-03 RX ADMIN — WATER 40 MG: 1 INJECTION INTRAMUSCULAR; INTRAVENOUS; SUBCUTANEOUS at 21:01

## 2024-05-03 RX ADMIN — LINEZOLID 600 MG: 600 INJECTION, SOLUTION INTRAVENOUS at 21:05

## 2024-05-03 RX ADMIN — SODIUM CHLORIDE 50 ML: 9 INJECTION, SOLUTION INTRAVENOUS at 21:04

## 2024-05-03 RX ADMIN — INSULIN HUMAN 11 UNITS: 100 INJECTION, SUSPENSION SUBCUTANEOUS at 11:58

## 2024-05-03 RX ADMIN — IPRATROPIUM BROMIDE AND ALBUTEROL SULFATE 1 DOSE: .5; 3 SOLUTION RESPIRATORY (INHALATION) at 20:39

## 2024-05-03 RX ADMIN — IPRATROPIUM BROMIDE AND ALBUTEROL SULFATE 1 DOSE: .5; 3 SOLUTION RESPIRATORY (INHALATION) at 08:34

## 2024-05-03 RX ADMIN — MELATONIN 3 MG: at 21:13

## 2024-05-03 ASSESSMENT — PAIN SCALES - GENERAL
PAINLEVEL_OUTOF10: 0

## 2024-05-03 NOTE — PLAN OF CARE
Problem: Occupational Therapy - Adult  Goal: By Discharge: Performs self-care activities at highest level of function for planned discharge setting.  See evaluation for individualized goals.  Description: FUNCTIONAL STATUS PRIOR TO ADMISSION:  Patient is a questionable historian, admitted from [facility] rehab. Note recent hospitalization and L ankle ORIF (3/23/24), discharging to [facility] with NWB LLE precautions. Prior to initial injury, patient was modified independent with ADLs and functional mobility using rollator.     Receives Help From: Family, ADL Assistance: Needs assistance, Homemaking Assistance: Needs assistance, Ambulation Assistance: Needs assistance, Active : No     HOME SUPPORT: Patient lived with supportive spouse, however admitted from [facility].    Occupational Therapy Goals:  Initiated 4/26/2024, goals remain appropriate as per 5/3 weekly reevaluation.   1.  Patient will perform adhere to NWB LLE throughout functional activity/mobility with minimal verbal cues within 7 day(s).  2.  Patient will perform upper body dressing and bathing with Supervision within 7 day(s).  3.  Patient will perform seated lower body bathing using lateral weight-shifting technique with Minimal Assist within 7 day(s).  4.  Patient will perform lateral toilet transfers to bariatric drop-arm BSC with Supervision / Set-up within 7 day(s).  5.  Patient will perform all aspects of seated toileting with Minimal Assist within 7 day(s).  6.  Patient will participate in upper extremity therapeutic exercise/activities with Supervision for 10 minutes within 7 day(s).    7.  Patient will utilize energy conservation techniques during functional activities with verbal cues within 7 day(s).  Outcome: Progressing    OCCUPATIONAL THERAPY TREATMENT: WEEKLY REASSESSMENT    Patient: Marilee Oakes (73 y.o. female)  Date: 5/3/2024  Primary Diagnosis: HILARY (acute kidney injury) (HCC) [N17.9]  Anemia, unspecified type [D64.9]  Procedure(s)  (LRB):  ANKLE INCISION AND DRAINAGE WITH HARDWARE REMOVAL (Left) 3 Days Post-Op   Precautions: Fall Risk, Up as Tolerated, Bed Alarm, Weight Bearing   Left Lower Extremity Weight Bearing: Non Weight Bearing            Chart, occupational therapy assessment, plan of care, and goals were reviewed.    ASSESSMENT  Patient continues to benefit from skilled OT services and is slowly progressing towards goals. Overall she was SBA to min A for bed mobility, min A for scooting on all directions, supervision/set up for seated grooming and she was CGA for donning/doffing her R shoe. She does required cueing to maintain LLE NWB during EOB scooting, but is slowly demonstrating carryover. Good balance noted for seated ADLs and scooting seated EOB this session. At this time the patient remains limited by LLE NWB precautions, GW, decreased activity tolerance, baseline obesity and decreased safety awareness. She will continue to benefit from acute OT and will need to return to [facility] after discharge.              PLAN  Goals have been updated based on progression since last assessment.  Patient continues to benefit from skilled intervention to address the above impairments.    Recommendations and Planned Interventions:   self care training, therapeutic activities, functional mobility training, balance training, therapeutic exercise, cognitive retraining, patient education, and home safety training    Frequency/Duration: OT Plan of Care: 4 times/week      Recommendation for discharge: (in order for the patient to meet his/her long term goals): Therapy 3 hours/day 5-7 days/week           OBJECTIVE DATA SUMMARY:   Cognitive/Behavioral Status:  Orientation  Overall Orientation Status: Within Functional Limits  Orientation Level: Oriented to place;Oriented to person;Oriented to situation;Disoriented to time  Cognition  Overall Cognitive Status: Exceptions  Arousal/Alertness: Appropriate responses to stimuli  Following Commands: Follows one

## 2024-05-03 NOTE — PROGRESS NOTES
ADULT PROTOCOL: JET AEROSOL ASSESSMENT    Patient  Marilee Oakes     73 y.o.   female     5/3/2024  2:49 PM    Breath Sounds Pre Procedure: Breath Sounds Pre-Tx ADELE: Diminished                                  Breath Sounds Pre-Tx LLL: Diminished        Breath Sounds Pre-Tx RUL: Diminished        Breath Sounds Pre-Tx RML: Diminished        Breath Sounds Pre-Tx RLL: Diminished  Breath Sounds Post Procedure: Breath Sounds Post-Tx ADELE: Diminished          Breath Sounds Post-Tx LLL: Diminished          Breath Sounds Post-Tx RUL: Diminished          Breath Sounds Post-Tx RML: Diminished          Breath Sounds Post-Tx RLL: Diminished                                     Heart Rate: Pre procedure Pre-Tx Pulse: 82           Post procedure Post-Tx Pulse: 81    Resp Rate: Pre procedure Pre-Tx Resps: 18           Post procedure Post-Tx Resps: 16    Peak Flow: Pre bronchodilator             Post bronchodilator       Oxygen: O2 Therapy: Oxygen   2L nasal cannula     Changed: No    SpO2:  SpO2: 98 %   with Oxygen                Nebulizer Therapy: Current medications Medications: Arformoterol, Budesonide      Changed: No    Comments:      Problem List:   Patient Active Problem List   Diagnosis    Open wound of left lower leg    HBP (high blood pressure)    IBS (irritable bowel syndrome)    Foot pain, right    Ischemic cardiomyopathy    Anxiety    Ingrown toenail of left foot    Asthma    Polypharmacy    Long-term use of high-risk medication    Hypokalemia    Lethargy    Hypoglycemia    Acute exacerbation of COPD with asthma (HCC)    Obesity (BMI 30-39.9)    Hypoxia    Lower back pain    Chronic atrial fibrillation (HCC)    Hypercholesterolemia    Presence of Watchman left atrial appendage closure device    Atrial fibrillation (HCC)    Spinal stenosis, lumbar region, without neurogenic claudication    B12 deficiency    Lower abdominal pain    Type 2 diabetes mellitus with diabetic neuropathy, without long-term current use of

## 2024-05-03 NOTE — PROGRESS NOTES
Name: Marilee Oakes   MRN: [id number]  : 1950                                                           Assessment:            HILARY on CKD stage IIIb  Acute kidney injury grade 2 KDIGO classification  Urinary incontinence  ?  Urosepsis-positive for yeast; recurrent UTI  Chronic anemia  AE of COPD-mild  CHF with ICD  Afib with watchman device  L ankle fracture- s/p ORIF  MRSA wound infection- Bcx at [facility] for MRSA PTA Discussion:      Creatinine worsening (suspect ATN from infection/UTI/Afib + CRS )    UCPR 2.2 grams  CT A/P with no hydro  Known to have baseline CKD stage IIIb ,  lymphedema and type 2 diabetes without complications  Follows with Dr. Evangelista-baseline creatinine 1.3-1.7  No high residual on bladder scan         Plan:   Lasix on hold  Off of vanc  Antibiotics per ID  Monitor UOP  AM labs  Watch for renal recovery.    Had HD #1 on .  Plan for HD today and again in AM.    Discussed with patient and  and HD RN.      Subjective:    No complaints.    Exam:  BP (!) 149/89   Pulse 80   Temp 98.1 °F (36.7 °C) (Oral)   Resp 24   Wt 110 kg (242 lb 8.1 oz)   SpO2 97%   BMI 35.81 kg/m²     WB/WN, NAD  rhonchi  Irregular rhythm, distant  Trace edema      Labs/Data:    Lab Results   Component Value Date    WBC 5.5 2024    HGB 8.2 (L) 2024    HCT 28.6 (L) 2024    MCV 86.7 2024     2024       Lab Results   Component Value Date/Time     2024 05:00 AM    K 3.9 2024 05:00 AM     2024 05:00 AM    CO2 25 2024 05:00 AM    BUN 63 2024 05:00 AM    CREATININE 3.52 2024 05:00 AM    GLUCOSE 187 2024 05:00 AM    CALCIUM 7.9 2024 05:00 AM    LABGLOM 13 2024 05:00 AM    LABGLOM 11 2024 03:50 AM        Wt Readings from Last 3 Encounters:   24 110 kg (242  lb 8.1 oz)   04/03/24 102.7 kg (226 lb 6.6 oz)   03/22/24 102.5 kg (226 lb)         Intake/Output Summary (Last 24 hours) at 5/3/2024 1015  Last data filed at 5/3/2024 0000  Gross per 24 hour   Intake 1100 ml   Output 3050 ml   Net -1950 ml         Patient seen and examined. Chart reviewed. Labs, data and other pertinent notes reviewed in last 24 hrs.    PMH/SH/FH reviewed and unchanged compared to H&P      Dion Brizuela MD

## 2024-05-03 NOTE — PROGRESS NOTES
Infectious Disease Progress        Impression    HILARY on CKD3   On HD  Cr 3.52      MRSA bacteremia  Blood cultures + for MRSA at SNF  Details unavailable at this time  D/w SNF MD today, she confirms  Blood cultures 4/3-no growth.  JUAN MANUEL negative.  Negative Blood cultures this admission.  D/c on Daptomycin  S/p macular rash with Daptomycin.  Changed to Vancomycin on 4/22.     Bimalleolar left ankle fracture  Non union   with hardware failure  S/p ORIF L/ankle fracture 3/23  Swelling + & serosanguinous drainage at surgical site  S/p incisional wound VAC placement 4/4.  Wound culture 4/3+ for MRSA  Culture reported from drainage at surgical site.  S/p S/p debridement & removal of hardware  Closed reduction & splinting 4/30 by Ortho. Findings of purulence per op note  S/p removal of hardware, plan for wound vacc x 7 days. D/w Dr Farnsworth.      Acute metabolic encephalopathy  Resolved  Negative CT head.      Yeast + in UA  Fuconazole po x 3 days.       CAD  Acute on chronic systolic CHF  A.fib  Watchmen present.  Retained RV pacing lead+.         Diabetes Mellitus 2   On SSI   A1c 5.8        COPD  LEELA on CPAP      Obesity  BMI 35.81      Plan  Continue Linezolid IV  Monitor for adverse events- anemia, leucopenia, thrombo cytopenia  Pt will require MRSA therapy end date 5/15  MRSA decolonization-nasal mupirocin, Hibiclens skin wipes  Will plan to change to dxycycline po for DC given potential for numerous side ffects with linezolid, more pronounced in elderly.  ID service to follow.      Please contact ID on call with questions, concerns over the weekend.      Extensive review of chart notes, labs, imaging, cultures done  Additionally review of done: Recent reports-Labs, cultures, imaging  D/w -hospitalist, RN  Marilee FRIED Violette is a 73 y.o. female with past medical history significant for MRSA bacteremia, ORIF of left ankle fracture, wound drainage at surgical site  & wound culture positive for MRSA.  Patient is well-known to ID  BLADDER: The urinary bladder is normal.     Left kidney not imaged. No right renal hydronephrosis. Evidence for medical renal disease.    XR CHEST PORTABLE    Addendum Date: 4/25/2024    Addendum:  Right arm PICC line has its tip overlying the right atrium. It can be withdrawn approximately 5 cm    Result Date: 4/25/2024  INDICATION:  Sepsis Exam: Portable chest 1135.: Is rotated to the right. Comparison: 4/12/2024. Findings: Cardiomediastinal silhouette is within normal limits. Pulmonary vasculature is not engorged. There are no focal parenchymal opacities, effusions, or pneumothorax. Multi lead left chest AICD unchanged     1. No acute disease     CT HEAD WO CONTRAST    Result Date: 4/25/2024  EXAM: CT HEAD WO CONTRAST INDICATION: altered level of consciousness COMPARISON: CT head without contrast from 5/19/2021. TECHNIQUE: Unenhanced CT of the head was performed using 5 mm images. Brain and bone windows were generated.  CT dose reduction was achieved through use of a standardized protocol tailored for this examination and automatic exposure control for dose modulation. FINDINGS: Generalized volume loss. The ventricles and sulci are normal in size, shape and configuration and midline. Mild subcortical and periventricular white matter hypodensities likely represent chronic microangiopathic changes. Chronic infarction left cerebellum.. There is no intracranial hemorrhage, extra-axial collection, mass, mass effect or midline shift.  The basilar cisterns are open. No acute infarct is identified. The bone windows demonstrate no abnormalities. The visualized portions of the paranasal sinuses and mastoid air cells are clear.     No acute intracranial abnormality. Chronic changes as above.    Vascular ankle brachial index (DESHAUN)    Result Date: 4/13/2024    Right side findings: Resting DESHAUN is 1.34. Resting TBI is 1.08.   Left side findings: Resting DESHAUN is 1.42. Resting TBI is 1.23.   Bilateral ABIs are unreliable due to

## 2024-05-03 NOTE — PROGRESS NOTES
ORTHO - Progress Note  Post Op day: 3 Days Post-Op    Marilee Oakes     [id number]  female    73 y.o.    1950    Admit date:2024  Procedures:Procedure(s):  ANKLE INCISION AND DRAINAGE WITH HARDWARE REMOVAL  Surgeon:Surgeon(s) and Role:     * Edgardo Farnsworth, DO - Primary        SUBJECTIVE:     Marilee Oakes is a 73 y.o. female resting in the bed.  Patient has no complaints.  Denies F/C, nausea, vomiting, dizziness, lightheadedness, chest pain, or shortness of breath.    OBJECTIVE:       Physical Exam:  General: alert, cooperative, no distress.   Gastrointestinal:  non-distended .    Cardiovascular: equal pulses in the  lower extremities,  Brisk cap refill in all distal extremities   Genitourinary: Purewick   Respiratory: No respiratory distress   Neurological:Neurovascular exam within normal limits.     Senstion intact: LE bilat.    Motor: + Moving all digits.  Dressing/Wound: Splint LLE Clean, dry and intact. No significant erythema or swelling.    Vital Signs:       Patient Vitals for the past 8 hrs:   BP Temp Temp src Pulse Resp SpO2   24 1202 (!) 153/92 97.8 °F (36.6 °C) Oral 80 20 98 %   24 0835 -- -- -- -- -- 97 %   24 0730 (!) 149/89 98.1 °F (36.7 °C) Oral 80 24 95 %                                          Temp (24hrs), Av.9 °F (36.6 °C), Min:97.2 °F (36.2 °C), Max:98.4 °F (36.9 °C)    Date 24 0000 - 24 2359   Shift 3174-6815 4475-5730 1701-1619 24 Hour Total   INTAKE   Shift Total(mL/kg)       OUTPUT   Urine(mL/kg/hr) 350(0.4)   350   Shift Total(mL/kg) 350(3.2)   350(3.2)   Weight (kg) 110 110 110 110     Labs:        Recent Labs     24  0500   HCT 28.6*   HGB 8.2*     PT/OT:              ASSESSMENT / PLAN:   Principal Problem:    HILARY (acute kidney injury) (HCC)  Active Problems:    Coronary artery disease due to lipid rich plaque    Surgical site infection    Antibiotic causing adverse effect    Yeast UTI    Chronic obstructive pulmonary disease

## 2024-05-03 NOTE — PLAN OF CARE
Problem: Physical Therapy - Adult  Goal: By Discharge: Performs mobility at highest level of function for planned discharge setting.  See evaluation for individualized goals.  Description: FUNCTIONAL STATUS PRIOR TO ADMISSION: The patient is a questionable historian. Presenting from [facility]. Multiple recent hospitalizations.  At [facility] for rehabilitation s/p fall with L ankle fx ORIF done on 3/23/24. pt is NWB and was working on scooting, lateral transfers, and standing at rehab in parallel bars to this point (per patient report). New surgery to remove L ankle hardware with I&D and application of wound vac dressing due to infection and failure of bones to heal on 4/30/24.    HOME SUPPORT PRIOR TO ADMISSION: The patient lived with spouse. Admitted from [facility].    Physical Therapy Goals  Initiated 4/26/2024; Re-assessed 5/3/24 - current goals still appropriate.  1.  Patient will move from supine to sit and sit to supine, scoot up and down, and roll side to side in bed with modified independence within 7 day(s).    2.  Patient will perform sit to stand with moderate assistance within 7 day(s).  3.  Patient will transfer from bed to chair and chair to bed with contact guard assist using the least restrictive device within 7 day(s).  Outcome: Progressing   PHYSICAL THERAPY TREATMENT: WEEKLY REASSESSMENT    Patient: Marilee Oakes (73 y.o. female)  Date: 5/3/2024  Primary Diagnosis: HILARY (acute kidney injury) (HCC) [N17.9]  Anemia, unspecified type [D64.9]  Procedure(s) (LRB):  ANKLE INCISION AND DRAINAGE WITH HARDWARE REMOVAL (Left) 3 Days Post-Op   Precautions: Fall Risk, Up as Tolerated, Bed Alarm, Weight Bearing   Left Lower Extremity Weight Bearing: Non Weight Bearing                  ASSESSMENT :  Patient continues to benefit from skilled PT services and is slowly progressing towards goals. The patient was in bed when PT arrived. Reviewed and performed LE exercises with good tolerance, except for L ankle which     uses CPAP    Permanent atrial fibrillation (HCC)     Presence of implantable cardioverter-defibrillator (ICD)     Presence of Watchman left atrial appendage closure device     Type 2 diabetes mellitus with diabetic neuropathy, without long-term current use of insulin (HCC) 06/05/2016    Venous insufficiency     Vitamin B12 deficiency      Past Surgical History:   Procedure Laterality Date    ANKLE FRACTURE SURGERY Left 3/23/2024    LEFT ANKLE OPEN REDUCTION INTERNAL FIXATION performed by Edgardo Farnsworth DO at [facility] MAIN OR    APPENDECTOMY      BREAST BIOPSY Left     about 4-5 years ago from 2022, neg    CHOLECYSTECTOMY  11/15/2018    COLONOSCOPY N/A 3/14/2018    COLONOSCOPY performed by Pepito Quintero MD at [facility] ENDOSCOPY    HYSTERECTOMY (CERVIX STATUS UNKNOWN)      IR KYPHOPLASTY LUMBAR FIRST LEVEL  12/22/2020    IR KYPHOPLASTY LUMBAR FIRST LEVEL 12/22/2020 [facility]    IR KYPHOPLASTY LUMBAR FIRST LEVEL  12/22/2020    IR KYPHOPLASTY THORACIC FIRST LEVEL  1/6/2021    IR KYPHOPLASTY THORACIC FIRST LEVEL 1/6/2021 [facility]    IR KYPHOPLASTY THORACIC FIRST LEVEL  1/6/2021    IR NONTUNNELED VASCULAR CATHETER  5/2/2024    IR NONTUNNELED VASCULAR CATHETER 5/2/2024 Jeremy Oakes Jr., APRN - NP [facility]    LEG SURGERY Left 4/30/2024    ANKLE INCISION AND DRAINAGE WITH HARDWARE REMOVAL performed by Edgardo Farnsworth DO at [facility] MAIN OR    FRANCISCA STEROTACTIC LOC BREAST BIOPSY RIGHT Right 5/10/2022    FRANCISCA STEROTACTIC LOC BREAST BIOPSY RIGHT 5/10/2022 [facility]    PACEMAKER      WI UNLISTED PROCEDURE CARDIAC SURGERY      stent       Home Situation:  Social/Functional History  Lives With: Spouse  Type of Home: House  Home Layout: One level  Home Access: Stairs to enter with rails, Ramped entrance  Entrance Stairs - Number of Steps: 2  Entrance Stairs - Rails: Both  Bathroom Shower/Tub: Tub/Shower unit  Bathroom Toilet: Standard  Bathroom Equipment: Grab bars in shower, Shower chair  Home Equipment:

## 2024-05-03 NOTE — PLAN OF CARE
Problem: Discharge Planning  Goal: Discharge to home or other facility with appropriate resources  Outcome: Progressing  Flowsheets (Taken 5/3/2024 0800)  Discharge to home or other facility with appropriate resources: Identify barriers to discharge with patient and caregiver     Problem: Skin/Tissue Integrity  Goal: Absence of new skin breakdown  Description: 1.  Monitor for areas of redness and/or skin breakdown  2.  Assess vascular access sites hourly  3.  Every 4-6 hours minimum:  Change oxygen saturation probe site  4.  Every 4-6 hours:  If on nasal continuous positive airway pressure, respiratory therapy assess nares and determine need for appliance change or resting period.  5/3/2024 1003 by Anusha Miles RN  Outcome: Progressing  5/2/2024 2142 by Madison Whitmore RN  Outcome: Progressing  5/2/2024 2141 by Madison Whitmore RN  Outcome: Progressing     Problem: Safety - Adult  Goal: Free from fall injury  5/3/2024 1003 by Anusha Miles RN  Outcome: Progressing  5/2/2024 2142 by Madison Whitmore RN  Outcome: Progressing  5/2/2024 2141 by Madison Whitmore RN  Outcome: Progressing     Problem: ABCDS Injury Assessment  Goal: Absence of physical injury  5/3/2024 1003 by Anusha Miles RN  Outcome: Progressing  5/2/2024 2142 by Madison Whitmore RN  Outcome: Progressing  5/2/2024 2141 by Madison Whitmore RN  Outcome: Progressing     Problem: Chronic Conditions and Co-morbidities  Goal: Patient's chronic conditions and co-morbidity symptoms are monitored and maintained or improved  5/3/2024 1003 by Anusha Miles RN  Outcome: Progressing  Flowsheets (Taken 5/3/2024 0800)  Care Plan - Patient's Chronic Conditions and Co-Morbidity Symptoms are Monitored and Maintained or Improved: Monitor and assess patient's chronic conditions and comorbid symptoms for stability, deterioration, or improvement  5/2/2024 2142 by Madison Whitmore RN  Outcome: Progressing     Problem: Respiratory - Adult  Goal: Achieves optimal ventilation and  oxygenation  5/3/2024 1003 by Anusha Miles RN  Outcome: Progressing  5/3/2024 0838 by Shauna Esquivel RCP  Outcome: Progressing  Flowsheets (Taken 5/3/2024 0800 by Anusha Miles, RN)  Achieves optimal ventilation and oxygenation: Assess for changes in respiratory status  5/2/2024 2142 by Madison Whitmore RN  Outcome: Progressing     Problem: Pain  Goal: Verbalizes/displays adequate comfort level or baseline comfort level  5/3/2024 1003 by Anusha Miles RN  Outcome: Progressing  Flowsheets (Taken 5/3/2024 0730)  Verbalizes/displays adequate comfort level or baseline comfort level: Encourage patient to monitor pain and request assistance  5/2/2024 2142 by Madison Whitmore, RN  Outcome: Progressing

## 2024-05-03 NOTE — DIABETES MGMT
[facility] DIABETES [facility]  DIABETES MANAGEMENT CONSULT    Consulted by Provider for advanced nursing evaluation and care for inpatient blood glucose management.    Evaluation and Action Plan   Marilee Oakes is a 73 year old female patient with diet controlled Type 2 diabetes. Her current A1c is 5.8%. The patient took Metformin in the past but is was d/c'd by her PCP since BG's have been very well controlled. The patient's admission BG was 115.  She has since started on methylprednisolone and BG's are steadily rising. Infection and steroid use are likely primary culprits of BG elevation. The patient will require insulin to help override effects of the steroid. I suspect she will not require insulin once steroid dosing is complete.   BG's are stable on the current regimen. The patient reports having a dialysis treatment yesterday without complications. The patient does look better today. I will continue to use the NPH to override the effects of the steroid. I suspect the patient will not require insulin once steroid dosing is complete    Management Rationale Action Plan   Medication   Corrective insulin Using medium dose sensitivity based on weight    Overriding steroid effect Using 0.1 units/kg/D based on weight 0.1 x kg insulin with  40 mg methylprednisolone    Additional orders          Diabetes Discharge Plan   Medication  TBD   Additional orders            Initial Presentation   Marilee Oakes is a 73 y.o. female admitted  after experiencing complications from ankle surgery. The patient reportedly fractured ankle on March 22, 2024. The patient reportedly had hardware place during the surgery.Positive for MRSA.   LAB: Glucose 115. Creatine 2.59. A1c 5.8%.   CXR:  CT:Narrative & Impression  EXAM: CT HEAD WO CONTRAST     INDICATION: altered level of consciousness     COMPARISON: CT head without contrast from 5/19/2021.     TECHNIQUE: Unenhanced CT of the head was performed using 5 mm images. Brain

## 2024-05-03 NOTE — CARE COORDINATION
Transition of Care Plan:     RUR: 29     Prior Level of Functioning: Pt has the following DME at home: hospital bed, shower chair, rollator, home oxygen ([employer] wheelchair, and sliding board. Spouse is main caregiver support Family support of two local daughters and two sisters.  Pt needs assistance with ADLs: Bathing, Housework, Shopping and Mobility.     Disposition:  Home with [facility].      Pt has a new PCP appt scheduled or 6/21 with Dr. Mathur.  [facility] has a PCP that can sign HH orders until new PCP can be seen.   - .      If SNF or IPR: Date FOC offered: [facility] evaluating for admission/return  Date FOC received:   Accepting facility:   Date authorization started with reference number: no auth needed  Date authorization received and expires:      Follow up appointments:PCP: Kevan: 6/21/24  ID: Sinsonnymelvin: 5/14 at 2 PM   DME needed: patient has multiple DME at home  Transportation at discharge: BLS transport will need to be arranged.   IM/IMM Medicare/ letter given: 2nd IM letter will need to be delivered.   Is patient a [place] and connected with [facility]? N/a              If yes, was [place] transfer form completed and [facility] notified?   Caregiver Contact: Spouse: Wesley Oakes; [phone]  Discharge Caregiver contacted prior to discharge? yes  Care Conference needed? N/a  Barriers to discharge: Renal Improvement       agreeing to have patient go to [facility]. [facility] will continue to evaluate for admission. Possible DC the 7th . ABX IV to continue until 5/15    English Melchor ALARCON CM  [id number]        Revision History

## 2024-05-03 NOTE — PROGRESS NOTES
2312 (!) 156/80 98 °F (36.7 °C) Oral 80 18 96 %   05/02/24 1900 136/81 97.2 °F (36.2 °C) Oral 80 18 97 %   05/02/24 1845 (!) 153/85 -- -- 81 -- --   05/02/24 1830 (!) 141/80 -- -- 80 -- --   05/02/24 1815 (!) 145/80 -- -- 80 -- --   05/02/24 1800 (!) 147/78 -- -- 80 -- --   05/02/24 1745 (!) 151/84 -- -- 80 -- --   05/02/24 1730 134/72 -- -- 80 -- --   05/02/24 1715 138/72 -- -- 80 -- --   05/02/24 1700 133/82 -- -- 80 -- --   05/02/24 1648 128/66 -- -- 80 -- --   05/02/24 1640 128/66 98.4 °F (36.9 °C) -- 80 18 --   05/02/24 1455 (!) 154/83 -- -- 88 21 97 %   05/02/24 1420 (!) 145/85 -- -- 80 17 98 %   05/02/24 1410 (!) 140/84 -- -- 80 19 98 %   05/02/24 1405 122/74 -- -- 80 19 99 %           Intake/Output Summary (Last 24 hours) at 5/3/2024 1203  Last data filed at 5/3/2024 0000  Gross per 24 hour   Intake 1100 ml   Output 3050 ml   Net -1950 ml          I had a face to face encounter and independently examined this patient on 5/3/2024, as outlined below:  PHYSICAL EXAM:  General: Alert, cooperative  EENT:  EOMI. Anicteric sclerae.  Resp:  CTA bilaterally, no wheezing or rales.  No accessory muscle use  CV:  Regular  rate,  No edema  GI:  Soft, Non distended, Non tender.  +Bowel sounds  Neurologic:  Alert and oriented X 4, more alert, normal speech,   Skin:  No rashes.  No jaundice    Reviewed most current lab test results and cultures  YES  Reviewed most current radiology test results   YES  Review and summation of old records today    NO  Reviewed patient's current orders and MAR    YES  PMH/SH reviewed - no change compared to H&P    Procedures: see electronic medical records for all procedures/Xrays and details which were not copied into this note but were reviewed prior to creation of Plan.      LABS:  I reviewed today's most current labs and imaging studies.  Pertinent labs include:  Recent Labs     05/01/24  0246 05/02/24  0214 05/03/24  0500   WBC 4.5 3.2* 5.5   HGB 7.5* 7.3* 8.2*   HCT 27.9* 26.5* 28.6*

## 2024-05-03 NOTE — PROGRESS NOTES
0720-Bedside and Verbal shift change report given to AGNES Tavares (oncoming nurse) by AGNES Wallace (offgoing nurse). Report included the following information Nurse Handoff Report, Index, Intake/Output, MAR, Recent Results, Cardiac Rhythm V paced, and Neuro Assessment.  Noted non blanchable excoriation to bilateral inner buttocks. Also Assessed by Madison Saleh RN during shiftchange.  Patient also noted to have had a BM.  Cleaned up from small brown soft BM.  Sitter at bedside (patient was trying to remove Tripp and PICC on multiple occassions and was no longer responding to Avasure. Tripp and PICC dressings to be changed today.      0800-Tripp Dressing changed by HARLEY Bass RN.    1000-PICC dressing changed.    1100-Patient for HD again today.  Put on tele box 127.  Per HD RN, patient to be dialyzed after lunch.    1350-Patient to HD with her sitter.  Telemetry notified.    1730-Patient back from HD.  They pulled 2.5L.    1910-Bedside and Verbal shift change report given to AGNES Wallace (oncoming nurse) by AGNES Tavares (offgoing nurse). Report included the following information Nurse Handoff Report, Index, Intake/Output, MAR, Recent Results, Cardiac Rhythm V paced, and Neuro Assessment.  Patient continues to try to pull PICC and Tripp and is confused.  Still requiring sitter.

## 2024-05-03 NOTE — PROGRESS NOTES
0650 Avasis no longer effective and Pt continues to pick at lines trying to remove Tripp PICC . She is confused and also oriented to self and events in her life . She does not know why she is in hospital . Requires frequent redirection . Charge nurse and CCL made aware ,Dr Amparo ortiz served . I mean time for safety PCT sits for now

## 2024-05-03 NOTE — PLAN OF CARE
Problem: Discharge Planning  Goal: Discharge to home or other facility with appropriate resources  5/2/2024 1738 by Kristina Leos RN  Outcome: Progressing     Problem: Skin/Tissue Integrity  Goal: Absence of new skin breakdown  Description: 1.  Monitor for areas of redness and/or skin breakdown  2.  Assess vascular access sites hourly  3.  Every 4-6 hours minimum:  Change oxygen saturation probe site  4.  Every 4-6 hours:  If on nasal continuous positive airway pressure, respiratory therapy assess nares and determine need for appliance change or resting period.  5/2/2024 2142 by Madison Whitmore RN  Outcome: Progressing  5/2/2024 2141 by Madison Whitmore RN  Outcome: Progressing  5/2/2024 1738 by Kristina Leos RN  Outcome: Progressing     Problem: Safety - Adult  Goal: Free from fall injury  5/2/2024 2142 by Madison Whitmore RN  Outcome: Progressing  5/2/2024 2141 by Madison Whitmore RN  Outcome: Progressing  5/2/2024 1738 by Kristina Leos RN  Outcome: Progressing     Problem: ABCDS Injury Assessment  Goal: Absence of physical injury  5/2/2024 2142 by Madison Whitmore RN  Outcome: Progressing  5/2/2024 2141 by Madison Whitmore RN  Outcome: Progressing  5/2/2024 1738 by Kristina Leos RN  Outcome: Progressing     Problem: Chronic Conditions and Co-morbidities  Goal: Patient's chronic conditions and co-morbidity symptoms are monitored and maintained or improved  5/2/2024 2142 by Madison Whitmore RN  Outcome: Progressing  5/2/2024 1738 by Kristina Leos RN  Outcome: Progressing     Problem: Respiratory - Adult  Goal: Achieves optimal ventilation and oxygenation  5/2/2024 2142 by Madison Whitmore RN  Outcome: Progressing  5/2/2024 1738 by Kristina Leos RN  Outcome: Progressing  5/2/2024 0933 by Marina Wyatt RCP  Outcome: Progressing     Problem: Pain  Goal: Verbalizes/displays adequate comfort level or baseline comfort level  5/2/2024 2142 by Madison Whitmore RN  Outcome: Progressing  5/2/2024 1738 by Kristina Leos

## 2024-05-03 NOTE — FLOWSHEET NOTE
Pre hd note    05/03/24 1445   Vital Signs   BP (!) 157/83   Temp 98.4 °F (36.9 °C)   Pulse 79   Respirations 18   Pain Assessment   Pain Assessment None - Denies Pain   Pain Level 0   Treatment   Time On 1445   Treatment Goal 2500   Observations & Evaluations   Level of Consciousness 0   Oriented X 2   Heart Rhythm Regular   Respiratory Quality/Effort Dyspnea with exertion   O2 Device Nasal cannula   Bilateral Breath Sounds Diminished   Skin Condition/Temp Dry;Warm   Abdomen Inspection Obese   RLE Edema +2   LLE Edema +2   Technical Checks   Dialysis Machine No. 10   RO Machine Number [id number]   Dialyzer Lot No. [id number]   Tubing Lot Number [id number]   All Connections Secure Yes   NS Bag Yes   Saline Line Double Clamped Yes   Dialyzer Revaclear 300   Prime Volume (mL) 300 mL   ICEBOAT I;C;E;B;O;A;T   RO Machine Log Sheet Completed Yes   Machine Alarm Self Test Completed;Passed   Air Foam Detector Tested;Proper Function;pH Reading   Extracorporeal Circuit Tested for Integrity Yes   Machine Conductivity 13.9   Machine Ph 7.2   Manual Ph 7.2   Bleach Test (Neg) Yes   Bath Temperature 98.1 °F (36.7 °C)   Dialysis Bath   K+ (Potassium) 2   Ca+ (Calcium) 2.5   Na+ (Sodium) 140   HCO3 (Bicarb) 35   Bicarbonate Concentrate Lot No. [id number]   Acid Concentrate Lot No. [id number]   Treatment Initiation   Dialyze Hours 2.5   Treatment  Initiation Universal Precautions maintained;Lines secured to patient;Connections secured;Prime given;Venous Parameters set;Arterial Parameters set;Air foam detector engaged;Dialysate;Saline line double clamped;Hemo-Safe Applied;Dialyzer;Revaclear Dialyzer;Kidney;REV-300   Hemodialysis Central Access Right Neck   Placement Date: 05/02/24   Present on Admission/Arrival: No  Orientation: Right  Access Location: Neck   Continued need for line? Yes   Site Assessment Bleeding   CVC Lumen Status Alcohol cap applied   Venous Lumen Status Brisk blood return;Alcohol cap applied;Flushed   Arterial Lumen

## 2024-05-03 NOTE — PROGRESS NOTES
End of Shift Note    Bedside shift change report given to  (oncoming nurse) by Madison Whitmore RN (offgoing nurse).  Report included the following information SBAR    Shift worked:  Pt has drain to left ankle surgical site from 4/30 I do not see a LDA for this Drainage is minimal and non measurable ,color serous sang site CDI   Dressing to left ankle CDI   Shift summary and any significant changes:     Pt confused and attempting to remove HD port several times Charge RN Tiffani notified and [facility] ordered and placed at bedside the patient s  Wesley was called by Me and made aware ,and gave permission   Pr refused home I did place on her she removed immediately saying it was \"to small \" Placed NC 2 liters back on     ,Requiring multiple redirections from avasis techs not to touch her HD port  have it taped down well nad her PICC line wrapped with a ace wrap        Concerns for physician to address:  Spoke with  to inform of AVASIS he mentioned to me the thinks the lyrica makes her confused ,he asked me to hold it one night and have MD evaluate      Zone phone for oncoming shift:          Activity:     Number times ambulated in hallways past shift: 0  Number of times OOB to chair past shift: 0    Cardiac:   Cardiac Monitoring: Yes           Access:  Current line(s): PICC HD PORT     Genitourinary:   Urinary status: external catheter    Respiratory:      Chronic home O2 use?: YES  Incentive spirometer at bedside: YES       GI:     Current diet:  ADULT DIET; Regular; 3 carb choices (45 gm/meal); Low Fat/Low Chol/High Fiber/KAREEM; Low Sodium (2 gm)  DIET ONE TIME MESSAGE;  Passing flatus: YES  Tolerating current diet: YES       Pain Management:   Patient states pain is manageable on current regimen: YES    Skin:     Interventions: turn team, specialty bed, float heels, and increase time out of bed    Patient Safety:  Fall Score:    Interventions: bed/chair alarm, assistive device (walker, cane. etc), gripper

## 2024-05-04 LAB
ANION GAP SERPL CALC-SCNC: 5 MMOL/L (ref 5–15)
BACTERIA SPEC CULT: NORMAL
BACTERIA SPEC CULT: NORMAL
BASOPHILS # BLD: 0 K/UL (ref 0–0.1)
BASOPHILS NFR BLD: 0 % (ref 0–1)
BUN SERPL-MCNC: 45 MG/DL (ref 6–20)
BUN/CREAT SERPL: 17 (ref 12–20)
CALCIUM SERPL-MCNC: 7.9 MG/DL (ref 8.5–10.1)
CHLORIDE SERPL-SCNC: 105 MMOL/L (ref 97–108)
CO2 SERPL-SCNC: 29 MMOL/L (ref 21–32)
CREAT SERPL-MCNC: 2.7 MG/DL (ref 0.55–1.02)
DIFFERENTIAL METHOD BLD: ABNORMAL
EOSINOPHIL # BLD: 0 K/UL (ref 0–0.4)
EOSINOPHIL NFR BLD: 0 % (ref 0–7)
ERYTHROCYTE [DISTWIDTH] IN BLOOD BY AUTOMATED COUNT: 18.1 % (ref 11.5–14.5)
GLUCOSE BLD STRIP.AUTO-MCNC: 122 MG/DL (ref 65–117)
GLUCOSE BLD STRIP.AUTO-MCNC: 160 MG/DL (ref 65–117)
GLUCOSE BLD STRIP.AUTO-MCNC: 228 MG/DL (ref 65–117)
GLUCOSE BLD STRIP.AUTO-MCNC: 96 MG/DL (ref 65–117)
GLUCOSE SERPL-MCNC: 131 MG/DL (ref 65–100)
GRAM STN SPEC: NORMAL
GRAM STN SPEC: NORMAL
HBV CORE AB SERPL QL IA: NEGATIVE
HCT VFR BLD AUTO: 28.3 % (ref 35–47)
HGB BLD-MCNC: 8.1 G/DL (ref 11.5–16)
IMM GRANULOCYTES # BLD AUTO: 0 K/UL (ref 0–0.04)
IMM GRANULOCYTES NFR BLD AUTO: 1 % (ref 0–0.5)
LYMPHOCYTES # BLD: 0.5 K/UL (ref 0.8–3.5)
LYMPHOCYTES NFR BLD: 11 % (ref 12–49)
MAGNESIUM SERPL-MCNC: 1.8 MG/DL (ref 1.6–2.4)
MCH RBC QN AUTO: 24.3 PG (ref 26–34)
MCHC RBC AUTO-ENTMCNC: 28.6 G/DL (ref 30–36.5)
MCV RBC AUTO: 85 FL (ref 80–99)
MONOCYTES # BLD: 0.3 K/UL (ref 0–1)
MONOCYTES NFR BLD: 7 % (ref 5–13)
NEUTS SEG # BLD: 3.6 K/UL (ref 1.8–8)
NEUTS SEG NFR BLD: 81 % (ref 32–75)
NRBC # BLD: 0 K/UL (ref 0–0.01)
NRBC BLD-RTO: 0 PER 100 WBC
PHOSPHATE SERPL-MCNC: 4.8 MG/DL (ref 2.6–4.7)
PLATELET # BLD AUTO: 145 K/UL (ref 150–400)
PMV BLD AUTO: 11.5 FL (ref 8.9–12.9)
POTASSIUM SERPL-SCNC: 3.4 MMOL/L (ref 3.5–5.1)
RBC # BLD AUTO: 3.33 M/UL (ref 3.8–5.2)
RBC MORPH BLD: ABNORMAL
SERVICE CMNT-IMP: ABNORMAL
SERVICE CMNT-IMP: NORMAL
SODIUM SERPL-SCNC: 139 MMOL/L (ref 136–145)
WBC # BLD AUTO: 4.4 K/UL (ref 3.6–11)

## 2024-05-04 PROCEDURE — 6370000000 HC RX 637 (ALT 250 FOR IP): Performed by: STUDENT IN AN ORGANIZED HEALTH CARE EDUCATION/TRAINING PROGRAM

## 2024-05-04 PROCEDURE — 83735 ASSAY OF MAGNESIUM: CPT

## 2024-05-04 PROCEDURE — 2580000003 HC RX 258: Performed by: INTERNAL MEDICINE

## 2024-05-04 PROCEDURE — 2580000003 HC RX 258

## 2024-05-04 PROCEDURE — 2580000003 HC RX 258: Performed by: ANESTHESIOLOGY

## 2024-05-04 PROCEDURE — 6360000002 HC RX W HCPCS: Performed by: INTERNAL MEDICINE

## 2024-05-04 PROCEDURE — 94640 AIRWAY INHALATION TREATMENT: CPT

## 2024-05-04 PROCEDURE — 6370000000 HC RX 637 (ALT 250 FOR IP): Performed by: INTERNAL MEDICINE

## 2024-05-04 PROCEDURE — 85025 COMPLETE CBC W/AUTO DIFF WBC: CPT

## 2024-05-04 PROCEDURE — 82962 GLUCOSE BLOOD TEST: CPT

## 2024-05-04 PROCEDURE — 2060000000 HC ICU INTERMEDIATE R&B

## 2024-05-04 PROCEDURE — 2700000000 HC OXYGEN THERAPY PER DAY

## 2024-05-04 PROCEDURE — 90935 HEMODIALYSIS ONE EVALUATION: CPT

## 2024-05-04 PROCEDURE — 6370000000 HC RX 637 (ALT 250 FOR IP)

## 2024-05-04 PROCEDURE — 36415 COLL VENOUS BLD VENIPUNCTURE: CPT

## 2024-05-04 PROCEDURE — 6360000002 HC RX W HCPCS

## 2024-05-04 PROCEDURE — 84100 ASSAY OF PHOSPHORUS: CPT

## 2024-05-04 PROCEDURE — 80048 BASIC METABOLIC PNL TOTAL CA: CPT

## 2024-05-04 PROCEDURE — 94660 CPAP INITIATION&MGMT: CPT

## 2024-05-04 RX ORDER — HYDRALAZINE HYDROCHLORIDE 20 MG/ML
10 INJECTION INTRAMUSCULAR; INTRAVENOUS EVERY 6 HOURS PRN
Status: DISCONTINUED | OUTPATIENT
Start: 2024-05-04 | End: 2024-05-10 | Stop reason: HOSPADM

## 2024-05-04 RX ORDER — BENZONATATE 100 MG/1
100 CAPSULE ORAL 3 TIMES DAILY
Status: DISCONTINUED | OUTPATIENT
Start: 2024-05-04 | End: 2024-05-10 | Stop reason: HOSPADM

## 2024-05-04 RX ORDER — CASTOR OIL AND BALSAM, PERU 788; 87 MG/G; MG/G
OINTMENT TOPICAL 2 TIMES DAILY
Status: DISCONTINUED | OUTPATIENT
Start: 2024-05-04 | End: 2024-05-10 | Stop reason: HOSPADM

## 2024-05-04 RX ORDER — POTASSIUM CHLORIDE 20 MEQ/1
20 TABLET, EXTENDED RELEASE ORAL ONCE
Status: COMPLETED | OUTPATIENT
Start: 2024-05-04 | End: 2024-05-04

## 2024-05-04 RX ADMIN — ISOSORBIDE MONONITRATE 60 MG: 30 TABLET, EXTENDED RELEASE ORAL at 12:24

## 2024-05-04 RX ADMIN — HEPARIN SODIUM 5000 UNITS: 5000 INJECTION INTRAVENOUS; SUBCUTANEOUS at 05:46

## 2024-05-04 RX ADMIN — POTASSIUM CHLORIDE 20 MEQ: 1500 TABLET, EXTENDED RELEASE ORAL at 12:30

## 2024-05-04 RX ADMIN — MELATONIN 3 MG: at 21:04

## 2024-05-04 RX ADMIN — SODIUM CHLORIDE, PRESERVATIVE FREE 10 ML: 5 INJECTION INTRAVENOUS at 12:31

## 2024-05-04 RX ADMIN — BENZONATATE 100 MG: 100 CAPSULE ORAL at 16:16

## 2024-05-04 RX ADMIN — HEPARIN SODIUM 5000 UNITS: 5000 INJECTION INTRAVENOUS; SUBCUTANEOUS at 22:00

## 2024-05-04 RX ADMIN — ONDANSETRON 4 MG: 2 INJECTION INTRAMUSCULAR; INTRAVENOUS at 12:18

## 2024-05-04 RX ADMIN — INSULIN HUMAN 11 UNITS: 100 INJECTION, SUSPENSION SUBCUTANEOUS at 04:55

## 2024-05-04 RX ADMIN — WATER 40 MG: 1 INJECTION INTRAMUSCULAR; INTRAVENOUS; SUBCUTANEOUS at 04:55

## 2024-05-04 RX ADMIN — Medication: at 05:00

## 2024-05-04 RX ADMIN — PANTOPRAZOLE SODIUM 40 MG: 40 TABLET, DELAYED RELEASE ORAL at 12:20

## 2024-05-04 RX ADMIN — LINEZOLID 600 MG: 600 INJECTION, SOLUTION INTRAVENOUS at 21:15

## 2024-05-04 RX ADMIN — HEPARIN SODIUM 1300 UNITS: 1000 INJECTION INTRAVENOUS; SUBCUTANEOUS at 11:21

## 2024-05-04 RX ADMIN — CARVEDILOL 25 MG: 12.5 TABLET, FILM COATED ORAL at 18:11

## 2024-05-04 RX ADMIN — INSULIN HUMAN 11 UNITS: 100 INJECTION, SUSPENSION SUBCUTANEOUS at 12:19

## 2024-05-04 RX ADMIN — ARFORMOTEROL TARTRATE: 15 SOLUTION RESPIRATORY (INHALATION) at 20:43

## 2024-05-04 RX ADMIN — WATER 40 MG: 1 INJECTION INTRAMUSCULAR; INTRAVENOUS; SUBCUTANEOUS at 12:19

## 2024-05-04 RX ADMIN — SODIUM CHLORIDE 50 ML: 9 INJECTION, SOLUTION INTRAVENOUS at 21:14

## 2024-05-04 RX ADMIN — SODIUM CHLORIDE, PRESERVATIVE FREE 10 ML: 5 INJECTION INTRAVENOUS at 21:03

## 2024-05-04 RX ADMIN — INSULIN HUMAN 11 UNITS: 100 INJECTION, SUSPENSION SUBCUTANEOUS at 20:58

## 2024-05-04 RX ADMIN — LINEZOLID 600 MG: 600 INJECTION, SOLUTION INTRAVENOUS at 12:29

## 2024-05-04 RX ADMIN — WATER 40 MG: 1 INJECTION INTRAMUSCULAR; INTRAVENOUS; SUBCUTANEOUS at 20:58

## 2024-05-04 RX ADMIN — SODIUM CHLORIDE: 9 INJECTION, SOLUTION INTRAVENOUS at 12:28

## 2024-05-04 RX ADMIN — PREGABALIN 150 MG: 75 CAPSULE ORAL at 20:59

## 2024-05-04 RX ADMIN — IPRATROPIUM BROMIDE AND ALBUTEROL SULFATE 1 DOSE: .5; 3 SOLUTION RESPIRATORY (INHALATION) at 20:43

## 2024-05-04 RX ADMIN — ASPIRIN 81 MG: 81 TABLET, CHEWABLE ORAL at 12:20

## 2024-05-04 RX ADMIN — HEPARIN SODIUM 5000 UNITS: 5000 INJECTION INTRAVENOUS; SUBCUTANEOUS at 12:19

## 2024-05-04 RX ADMIN — CLONAZEPAM 0.5 MG: 0.5 TABLET ORAL at 20:59

## 2024-05-04 RX ADMIN — Medication: at 21:06

## 2024-05-04 ASSESSMENT — PAIN SCALES - GENERAL
PAINLEVEL_OUTOF10: 0

## 2024-05-04 NOTE — PROGRESS NOTES
End of Shift Note    Bedside shift change report given to Madison (oncoming nurse) by Natalia Castillo RN (offgoing nurse).  Report included the following information SBAR and MAR    Shift worked:  7a-7p     Shift summary and any significant changes:     Dialysis performed today; see note     Concerns for physician to address:  None     Zone phone for oncoming shift:   [phone]       Activity:     Number times ambulated in hallways past shift: 0  Number of times OOB to chair past shift: 0    Cardiac:   Cardiac Monitoring: Yes           Access:  Current line(s): PIV     Genitourinary:   Urinary status: external catheter    Respiratory:      Chronic home O2 use?: YES  Incentive spirometer at bedside: NO       GI:     Current diet:  ADULT DIET; Regular; 3 carb choices (45 gm/meal); Low Fat/Low Chol/High Fiber/KAREEM; Low Sodium (2 gm)  DIET ONE TIME MESSAGE;  Passing flatus: YES  Tolerating current diet: YES       Pain Management:   Patient states pain is manageable on current regimen: YES    Skin:     Interventions: increase time out of bed    Patient Safety:  Fall Score:    Interventions: assistive device (walker, cane. etc)       Length of Stay:  Expected LOS: 12  Actual LOS: 9      Natalia Castillo RN

## 2024-05-04 NOTE — PROGRESS NOTES
End of Shift Note    Bedside shift change report given to  (oncoming nurse) by Madison Whitmore RN (offgoing nurse).  Report included the following information SBAR and MAR    Shift worked:  Sitter at bedside remains confused ,but less confused ,pulling less at lines ,LLE  dressing remains intact toes Warm mobile   ,Drain remains intact no accumulation of  new drainage .      Shift summary and any significant changes:     1900 Dual skin sacrum blanchable excoriation ,cleaned with soap and water ,zgaurd applied ,Will order wound care consult    Right plantar area callus skin with small open area in middle will cover with foam offload and order wound care consult Pic Taken  .Thsi wound has documented 1/2024   Concerns for physician to address:       Zone phone for oncoming shift:          Activity:     Number times ambulated in hallways past shift: 0  Number of times OOB to chair past shift: 0    Cardiac:   Cardiac Monitoring: Yes           Access:  Current line(s): PIV     Genitourinary:   Urinary status: incontinent and external catheter    Respiratory:      Chronic home O2 use?: YES  Incentive spirometer at bedside: YES       GI:     Current diet:  ADULT DIET; Regular; 3 carb choices (45 gm/meal); Low Fat/Low Chol/High Fiber/KAREEM; Low Sodium (2 gm)  DIET ONE TIME MESSAGE;  Passing flatus: YES  Tolerating current diet: YES       Pain Management:   Patient states pain is manageable on current regimen: YES    Skin:     Interventions: specialty bed, float heels, increase time out of bed, PT/OT consult, limit briefs, internal/external urinary devices, and nutritional support    Patient Safety:  Fall Score:    Interventions: bed/chair alarm, assistive device (walker, cane. etc), gripper socks, pt to call before getting OOB, and stay with me (per policy)       Length of Stay:  Expected LOS: 12  Actual LOS: 9      Madison Whitmore RN

## 2024-05-04 NOTE — FLOWSHEET NOTE
05/04/24 1115   Vital Signs   BP (!) 163/84   Temp 98.1 °F (36.7 °C)   Pulse 80   Respirations 18   Pain Assessment   Pain Assessment None - Denies Pain   Post-Hemodialysis Assessment   Post-Treatment Procedures Blood returned;Catheter capped, clamped and heparinized x 2 ports   Machine Disinfection Process Exterior Machine Disinfection   Rinseback Volume (ml) 300 ml   Blood Volume Processed (Liters) 50.7 L   Dialyzer Clearance Lightly streaked   Duration of Treatment (minutes) 180 minutes   Hemodialysis Intake (ml) 500 ml   Hemodialysis Output (ml) 3000 ml   NET Removed (ml) 2500   Tolerated Treatment Good   Bilateral Breath Sounds Diminished   Edema Right lower extremity;Left lower extremity   RLE Edema +2   LLE Edema +2   Time Off 1115   Patient Disposition Return to room   Observations & Evaluations   Level of Consciousness 0   Oriented X 2   Heart Rhythm Regular   Respiratory Quality/Effort Dyspnea with exertion   O2 Device Nasal cannula   Skin Color Pale   Skin Condition/Temp Dry;Warm   Appetite Good   Abdomen Inspection Obese;Soft   Bowel Sounds (All Quadrants) Active     Primary RN SBAR: Natalia Castillo RN  Comments: Pt tolerated treatment well without any complaints or complications. Pt confused. Sitter remained at bedside.      will keep pt on PT program while @ LI to prevent weakness/deconditioning./No skilled PT needs

## 2024-05-04 NOTE — FLOWSHEET NOTE
Primary RN SBAR: Natalia Castillo RN  Patient Education provided: HD Treatment  Preferred Education method and Primary language: Verbal, English  Hospital associated wait time; reason: 45 minutes wait for transport, Shelby ALARCON transported  Hepatitis B Surface Ag   Date/Time Value Ref Range Status   05/02/2024 05:36 PM <0.10 Index Final     Hep B S Ab   Date/Time Value Ref Range Status   05/02/2024 05:36 PM <3.10 mIU/mL Final        05/04/24 0815   Vital Signs   BP (!) 163/86   Temp 97.7 °F (36.5 °C)   Pulse 80   Respirations 18   Pain Assessment   Pain Assessment None - Denies Pain   Treatment   Time On 0815   Treatment Goal 2500 ml   Observations & Evaluations   Level of Consciousness 0   Oriented X 2   Heart Rhythm Regular   Respiratory Quality/Effort Dyspnea with exertion   O2 Device Nasal cannula   Skin Color Pale   Skin Condition/Temp Dry;Warm   Appetite Good   Abdomen Inspection Obese;Soft   Bowel Sounds (All Quadrants) Active   Edema Right lower extremity;Left lower extremity   RLE Edema +2   LLE Edema +2   Technical Checks   Dialysis Machine No. 09   RO Machine Number [id number]   Dialyzer Lot No. [id number]   Tubing Lot Number [id number]   All Connections Secure Yes   NS Bag Yes   Saline Line Double Clamped Yes   Dialyzer Revaclear 300   Prime Volume (mL) 200 mL   ICEBOAT I;C;E;B;O;A;T   RO Machine Log Sheet Completed Yes   Machine Alarm Self Test Completed;Passed   Air Foam Detector Tested;Proper Function   Extracorporeal Circuit Tested for Integrity Yes   Machine Conductivity 13.7   Manual Ph 7.2   Bleach Test (Neg) Yes   Bath Temperature 98.6 °F (37 °C)   Dialysis Bath   K+ (Potassium) 2   Ca+ (Calcium) 2.5   Na+ (Sodium) 140   HCO3 (Bicarb) 35   Treatment Initiation   Dialyze Hours 3   Treatment  Initiation Universal Precautions maintained;Lines secured to patient;Connections secured;Prime given;Venous Parameters set;Arterial Parameters set;Saline line double clamped;Revaclear Dialyzer   Hemodialysis Central

## 2024-05-04 NOTE — PROGRESS NOTES
Name: Marilee Oakes   MRN: [id number]  : 1950                                                           Assessment:            HILARY on CKD stage IIIb  Acute kidney injury grade 2 KDIGO classification  Urinary incontinence  ?  Urosepsis-positive for yeast; recurrent UTI  Chronic anemia  AE of COPD-mild  CHF with ICD  Afib with watchman device  L ankle fracture- s/p ORIF  MRSA wound infection- Bcx at [facility] for MRSA PTA Discussion:      Creatinine worsening (suspect ATN from infection/UTI/Afib + CRS )    UCPR 2.2 grams  CT A/P with no hydro  Known to have baseline CKD stage IIIb ,  lymphedema and type 2 diabetes without complications  Follows with Dr. Evangelista-baseline creatinine 1.3-1.7  No high residual on bladder scan         Plan:   Lasix on hold  Off of vanc  Antibiotics per ID  Monitor UOP  AM labs  Watch for renal recovery.    Had HD #1 on .  Seen on HD # 3 today at 0830.    Watch for renal recovery.    Will see again Monday.  Please call if any questions.    Discussed with patient and  and HD RN.      Subjective:    No complaints.    Exam:  BP (!) 160/82   Pulse 78   Temp 97.7 °F (36.5 °C)   Resp 18   Wt 109 kg (240 lb 4.8 oz)   SpO2 96%   BMI 35.49 kg/m²     WB/WN, NAD  rhonchi  Irregular rhythm, distant  Trace edema      Labs/Data:    Lab Results   Component Value Date    WBC 4.4 2024    HGB 8.1 (L) 2024    HCT 28.3 (L) 2024    MCV 85.0 2024     (L) 2024       Lab Results   Component Value Date/Time     2024 05:08 AM    K 3.4 2024 05:08 AM     2024 05:08 AM    CO2 29 2024 05:08 AM    BUN 45 2024 05:08 AM    CREATININE 2.70 2024 05:08 AM    GLUCOSE 131 2024 05:08 AM    CALCIUM 7.9 2024 05:08 AM    LABGLOM 18 2024 05:08 AM    LABGLOM 11

## 2024-05-04 NOTE — PLAN OF CARE
Problem: Skin/Tissue Integrity  Goal: Absence of new skin breakdown  Description: 1.  Monitor for areas of redness and/or skin breakdown  2.  Assess vascular access sites hourly  3.  Every 4-6 hours minimum:  Change oxygen saturation probe site  4.  Every 4-6 hours:  If on nasal continuous positive airway pressure, respiratory therapy assess nares and determine need for appliance change or resting period.  Outcome: Progressing     Problem: Safety - Adult  Goal: Free from fall injury  Outcome: Progressing     Problem: ABCDS Injury Assessment  Goal: Absence of physical injury  Outcome: Progressing     Problem: Chronic Conditions and Co-morbidities  Goal: Patient's chronic conditions and co-morbidity symptoms are monitored and maintained or improved  Outcome: Progressing     Problem: Physical Therapy - Adult  Goal: By Discharge: Performs mobility at highest level of function for planned discharge setting.  See evaluation for individualized goals.  Description: FUNCTIONAL STATUS PRIOR TO ADMISSION: The patient is a questionable historian. Presenting from [facility]. Multiple recent hospitalizations.  At [facility] for rehabilitation s/p fall with L ankle fx ORIF done on 3/23/24. pt is NWB and was working on scooting, lateral transfers, and standing at rehab in parallel bars to this point (per patient report). New surgery to remove L ankle hardware with I&D and application of wound vac dressing due to infection and failure of bones to heal on 4/30/24.    HOME SUPPORT PRIOR TO ADMISSION: The patient lived with spouse. Admitted from [facility].    Physical Therapy Goals  Initiated 4/26/2024; Re-assessed 5/3/24 - current goals still appropriate.  1.  Patient will move from supine to sit and sit to supine, scoot up and down, and roll side to side in bed with modified independence within 7 day(s).    2.  Patient will perform sit to stand with moderate assistance within 7 day(s).  3.  Patient will transfer from bed to chair and chair  Achieves optimal ventilation and oxygenation  5/4/2024 0340 by Madison Whitmore, RN  Outcome: Progressing  5/3/2024 2041 by Danni Webber, RT  Outcome: Progressing     Problem: Pain  Goal: Verbalizes/displays adequate comfort level or baseline comfort level  Outcome: Progressing

## 2024-05-04 NOTE — PROGRESS NOTES
Comprehensive Nutrition Assessment    Type and Reason for Visit:  Initial, RD Nutrition Re-Screen/LOS    Nutrition Recommendations/Plan:   Consistent carb, cardiac diet  Nepro daily to support wound healing  Please document % meals and supplements consumed in flowsheet I/O's under intake      Malnutrition Assessment:  Malnutrition Status:  No malnutrition (05/04/24 1345)        Nutrition Assessment:     Chart reviewed for LOS. Pt medically noted for MRSA wound infection, recurrent UTI, L ankle fracture s/p ORIF, CHF, CAD, CKD3, started HD on 5/2, COPD, chronic respiratory failure, toxic metabolic encephalopathy (more confused at night), DM2. MST was negative for malnutrition risk factors PTA. Good PO intake documented by nursing recently. Weight hx appears stable with some outliers. Phos has been elevated but trending down with HD. Will add diet restriction as needed. Will add daily Nepro shake to support post op wound healing and continue monitoring.    Patient Vitals for the past 120 hrs:   PO Meals Eaten (%)   05/03/24 1202 76 - 100%   05/03/24 0835 76 - 100%   04/29/24 1729 51 - 75%     Wt Readings from Last 10 Encounters:   05/04/24 109 kg (240 lb 4.8 oz)   04/03/24 102.7 kg (226 lb 6.6 oz)   03/22/24 102.5 kg (226 lb)   01/22/24 105.4 kg (232 lb 5.8 oz)   12/22/23 123.4 kg (272 lb 0.8 oz)   12/11/23 108.6 kg (239 lb 6.7 oz)   12/05/23 107.4 kg (236 lb 12.8 oz)   07/28/23 110.9 kg (244 lb 9.6 oz)   01/25/23 106.1 kg (233 lb 12.8 oz)   07/22/22 107.2 kg (236 lb 6.4 oz)   ]    Nutrition Related Findings:    Labs: K 3.4, Cr 2.70, BG -131, phos 4.8 (trending down). Meds: humalog, NPH, solu-medrol, protonix, zofran. Edema: 2+ generalized, trace BUE, 2+ BLE. BM 5/3. Wound Type: Surgical Incision, Diabetic Ulcer       Current Nutrition Intake & Therapies:    Average Meal Intake: %  Average Supplements Intake: None Ordered  ADULT DIET; Regular; 3 carb choices (45 gm/meal); Low Fat/Low Chol/High Fiber/KAREEM;

## 2024-05-04 NOTE — PROGRESS NOTES
Hospitalist Progress Note    NAME:   Marilee Oakes   : 1950   MRN: [id number]     Date/Time: 2024 1:50 PM  Patient PCP: No primary care provider on file.    Estimated discharge date:   Barriers: recovery after Orthopedic procedure, renal improvement, final antibiotic plan, Patient and family adamantly do not want patient to go back to [facility], started on HD on       Assessment / Plan:    MRSA wound infection  Recurrent UTI  Left ankle fracture s/p ORIF  Blood cultures taken from [facility] were positive for MRSA per MD at [facility], prompting prior admission. No blood cultures here are positive. Reportedly had rash with Daptomycin and now renal failure with Vancomycin.  Left leg bandaged.  Patient is afebrile, white count is normal, procalcitonin is negative.  - ID consulted, appreciate assistance  - Ceftaroline  - PICC line is present  - Ortho consulted, appreciate assistance  Status post surgery on   1. Left ankle excisional debridement of bone, soft tissue, tendon, muscle.     2. Left ankle removal of hardware.     3. Left ankle closed reduction and splinting.    ID initially recommended ceftaroline, switch to Zyvox  Awaiting final ID recommendations, discussed with ID     CHF with ICD - mild exacerbation, improving  CAD  A-fib with Watchman device  Continue with aspirin, Imdur, Coreg.  - BNP elevated, coarse lung sounds  - cpap overnight  - holding lasix with renal function declining    Acute on chronic renal failure   CKD 3 with baseline creatinine between 1.2-1.6.  Monitor patient telemetry.  Hold Bumex.  - lasix as above  Nephrology following, Cr uptrending, plan is to start HD on   Tripp placed on   Discussed with nephrology   Plan for 3rd session of HD today    COPD - exacerbation, improving  Chronic respiratory failure - 2L at baseline  - scheduled duonebs  - weaning steroids  - back to 2L which is her basline    Toxic metabolic encephalopathy -

## 2024-05-05 ENCOUNTER — APPOINTMENT (OUTPATIENT)
Facility: HOSPITAL | Age: 74
End: 2024-05-05
Payer: MEDICARE

## 2024-05-05 LAB
ANION GAP SERPL CALC-SCNC: 3 MMOL/L (ref 5–15)
ANION GAP SERPL CALC-SCNC: 6 MMOL/L (ref 5–15)
BASOPHILS # BLD: 0 K/UL (ref 0–0.1)
BASOPHILS # BLD: 0 K/UL (ref 0–0.1)
BASOPHILS NFR BLD: 0 % (ref 0–1)
BASOPHILS NFR BLD: 0 % (ref 0–1)
BUN SERPL-MCNC: 35 MG/DL (ref 6–20)
BUN SERPL-MCNC: 40 MG/DL (ref 6–20)
BUN/CREAT SERPL: 16 (ref 12–20)
BUN/CREAT SERPL: 16 (ref 12–20)
CALCIUM SERPL-MCNC: 7.6 MG/DL (ref 8.5–10.1)
CALCIUM SERPL-MCNC: 7.9 MG/DL (ref 8.5–10.1)
CHLORIDE SERPL-SCNC: 105 MMOL/L (ref 97–108)
CHLORIDE SERPL-SCNC: 105 MMOL/L (ref 97–108)
CO2 SERPL-SCNC: 27 MMOL/L (ref 21–32)
CO2 SERPL-SCNC: 30 MMOL/L (ref 21–32)
CREAT SERPL-MCNC: 2.21 MG/DL (ref 0.55–1.02)
CREAT SERPL-MCNC: 2.55 MG/DL (ref 0.55–1.02)
CRP SERPL-MCNC: 0.44 MG/DL (ref 0–0.3)
DIFFERENTIAL METHOD BLD: ABNORMAL
DIFFERENTIAL METHOD BLD: ABNORMAL
EOSINOPHIL # BLD: 0 K/UL (ref 0–0.4)
EOSINOPHIL # BLD: 0 K/UL (ref 0–0.4)
EOSINOPHIL NFR BLD: 0 % (ref 0–7)
EOSINOPHIL NFR BLD: 0 % (ref 0–7)
ERYTHROCYTE [DISTWIDTH] IN BLOOD BY AUTOMATED COUNT: 17.5 % (ref 11.5–14.5)
ERYTHROCYTE [DISTWIDTH] IN BLOOD BY AUTOMATED COUNT: 17.6 % (ref 11.5–14.5)
GLUCOSE BLD STRIP.AUTO-MCNC: 133 MG/DL (ref 65–117)
GLUCOSE BLD STRIP.AUTO-MCNC: 234 MG/DL (ref 65–117)
GLUCOSE BLD STRIP.AUTO-MCNC: 239 MG/DL (ref 65–117)
GLUCOSE BLD STRIP.AUTO-MCNC: 328 MG/DL (ref 65–117)
GLUCOSE BLD STRIP.AUTO-MCNC: 330 MG/DL (ref 65–117)
GLUCOSE BLD STRIP.AUTO-MCNC: 338 MG/DL (ref 65–117)
GLUCOSE SERPL-MCNC: 130 MG/DL (ref 65–100)
GLUCOSE SERPL-MCNC: 251 MG/DL (ref 65–100)
HCT VFR BLD AUTO: 25 % (ref 35–47)
HCT VFR BLD AUTO: 28.5 % (ref 35–47)
HGB BLD-MCNC: 7.3 G/DL (ref 11.5–16)
HGB BLD-MCNC: 8.2 G/DL (ref 11.5–16)
IMM GRANULOCYTES # BLD AUTO: 0 K/UL (ref 0–0.04)
IMM GRANULOCYTES # BLD AUTO: 0.1 K/UL (ref 0–0.04)
IMM GRANULOCYTES NFR BLD AUTO: 1 % (ref 0–0.5)
IMM GRANULOCYTES NFR BLD AUTO: 1 % (ref 0–0.5)
LYMPHOCYTES # BLD: 0.3 K/UL (ref 0.8–3.5)
LYMPHOCYTES # BLD: 0.4 K/UL (ref 0.8–3.5)
LYMPHOCYTES NFR BLD: 6 % (ref 12–49)
LYMPHOCYTES NFR BLD: 7 % (ref 12–49)
MAGNESIUM SERPL-MCNC: 1.6 MG/DL (ref 1.6–2.4)
MAGNESIUM SERPL-MCNC: 1.8 MG/DL (ref 1.6–2.4)
MCH RBC QN AUTO: 24.3 PG (ref 26–34)
MCH RBC QN AUTO: 24.4 PG (ref 26–34)
MCHC RBC AUTO-ENTMCNC: 28.8 G/DL (ref 30–36.5)
MCHC RBC AUTO-ENTMCNC: 29.2 G/DL (ref 30–36.5)
MCV RBC AUTO: 83.6 FL (ref 80–99)
MCV RBC AUTO: 84.6 FL (ref 80–99)
MONOCYTES # BLD: 0.1 K/UL (ref 0–1)
MONOCYTES # BLD: 0.2 K/UL (ref 0–1)
MONOCYTES NFR BLD: 3 % (ref 5–13)
MONOCYTES NFR BLD: 3 % (ref 5–13)
NEUTS SEG # BLD: 3.8 K/UL (ref 1.8–8)
NEUTS SEG # BLD: 4.3 K/UL (ref 1.8–8)
NEUTS SEG NFR BLD: 89 % (ref 32–75)
NEUTS SEG NFR BLD: 90 % (ref 32–75)
NRBC # BLD: 0 K/UL (ref 0–0.01)
NRBC # BLD: 0 K/UL (ref 0–0.01)
NRBC BLD-RTO: 0 PER 100 WBC
NRBC BLD-RTO: 0 PER 100 WBC
PHOSPHATE SERPL-MCNC: 4.3 MG/DL (ref 2.6–4.7)
PLATELET # BLD AUTO: 117 K/UL (ref 150–400)
PLATELET # BLD AUTO: 133 K/UL (ref 150–400)
PMV BLD AUTO: 11.5 FL (ref 8.9–12.9)
PMV BLD AUTO: 11.7 FL (ref 8.9–12.9)
POTASSIUM SERPL-SCNC: 4 MMOL/L (ref 3.5–5.1)
POTASSIUM SERPL-SCNC: 4.1 MMOL/L (ref 3.5–5.1)
RBC # BLD AUTO: 2.99 M/UL (ref 3.8–5.2)
RBC # BLD AUTO: 3.37 M/UL (ref 3.8–5.2)
RBC MORPH BLD: ABNORMAL
SERVICE CMNT-IMP: ABNORMAL
SODIUM SERPL-SCNC: 138 MMOL/L (ref 136–145)
SODIUM SERPL-SCNC: 138 MMOL/L (ref 136–145)
TROPONIN I SERPL HS-MCNC: 45 NG/L (ref 0–51)
WBC # BLD AUTO: 4.2 K/UL (ref 3.6–11)
WBC # BLD AUTO: 5 K/UL (ref 3.6–11)

## 2024-05-05 PROCEDURE — C9113 INJ PANTOPRAZOLE SODIUM, VIA: HCPCS | Performed by: NURSE PRACTITIONER

## 2024-05-05 PROCEDURE — 6360000002 HC RX W HCPCS: Performed by: INTERNAL MEDICINE

## 2024-05-05 PROCEDURE — 2580000003 HC RX 258: Performed by: INTERNAL MEDICINE

## 2024-05-05 PROCEDURE — 84100 ASSAY OF PHOSPHORUS: CPT

## 2024-05-05 PROCEDURE — 71045 X-RAY EXAM CHEST 1 VIEW: CPT

## 2024-05-05 PROCEDURE — 6370000000 HC RX 637 (ALT 250 FOR IP)

## 2024-05-05 PROCEDURE — 6370000000 HC RX 637 (ALT 250 FOR IP): Performed by: STUDENT IN AN ORGANIZED HEALTH CARE EDUCATION/TRAINING PROGRAM

## 2024-05-05 PROCEDURE — 85025 COMPLETE CBC W/AUTO DIFF WBC: CPT

## 2024-05-05 PROCEDURE — A4216 STERILE WATER/SALINE, 10 ML: HCPCS | Performed by: NURSE PRACTITIONER

## 2024-05-05 PROCEDURE — 6370000000 HC RX 637 (ALT 250 FOR IP): Performed by: INTERNAL MEDICINE

## 2024-05-05 PROCEDURE — 94640 AIRWAY INHALATION TREATMENT: CPT

## 2024-05-05 PROCEDURE — 2580000003 HC RX 258

## 2024-05-05 PROCEDURE — 2060000000 HC ICU INTERMEDIATE R&B

## 2024-05-05 PROCEDURE — 83735 ASSAY OF MAGNESIUM: CPT

## 2024-05-05 PROCEDURE — 94660 CPAP INITIATION&MGMT: CPT

## 2024-05-05 PROCEDURE — 80048 BASIC METABOLIC PNL TOTAL CA: CPT

## 2024-05-05 PROCEDURE — 2700000000 HC OXYGEN THERAPY PER DAY

## 2024-05-05 PROCEDURE — 84484 ASSAY OF TROPONIN QUANT: CPT

## 2024-05-05 PROCEDURE — 6360000002 HC RX W HCPCS: Performed by: NURSE PRACTITIONER

## 2024-05-05 PROCEDURE — 6360000002 HC RX W HCPCS

## 2024-05-05 PROCEDURE — 2580000003 HC RX 258: Performed by: NURSE PRACTITIONER

## 2024-05-05 PROCEDURE — 36415 COLL VENOUS BLD VENIPUNCTURE: CPT

## 2024-05-05 PROCEDURE — 82962 GLUCOSE BLOOD TEST: CPT

## 2024-05-05 RX ADMIN — BENZONATATE 100 MG: 100 CAPSULE ORAL at 08:17

## 2024-05-05 RX ADMIN — HEPARIN SODIUM 5000 UNITS: 5000 INJECTION INTRAVENOUS; SUBCUTANEOUS at 21:36

## 2024-05-05 RX ADMIN — ARFORMOTEROL TARTRATE: 15 SOLUTION RESPIRATORY (INHALATION) at 08:30

## 2024-05-05 RX ADMIN — IPRATROPIUM BROMIDE AND ALBUTEROL SULFATE 1 DOSE: .5; 3 SOLUTION RESPIRATORY (INHALATION) at 08:30

## 2024-05-05 RX ADMIN — HEPARIN SODIUM 5000 UNITS: 5000 INJECTION INTRAVENOUS; SUBCUTANEOUS at 13:37

## 2024-05-05 RX ADMIN — SODIUM CHLORIDE, PRESERVATIVE FREE 10 ML: 5 INJECTION INTRAVENOUS at 08:17

## 2024-05-05 RX ADMIN — PREGABALIN 150 MG: 75 CAPSULE ORAL at 21:36

## 2024-05-05 RX ADMIN — Medication: at 08:21

## 2024-05-05 RX ADMIN — ACETAMINOPHEN 650 MG: 325 TABLET ORAL at 15:40

## 2024-05-05 RX ADMIN — LINEZOLID 600 MG: 600 INJECTION, SOLUTION INTRAVENOUS at 21:50

## 2024-05-05 RX ADMIN — Medication 6 UNITS: at 16:35

## 2024-05-05 RX ADMIN — WATER 40 MG: 1 INJECTION INTRAMUSCULAR; INTRAVENOUS; SUBCUTANEOUS at 21:37

## 2024-05-05 RX ADMIN — ONDANSETRON 4 MG: 2 INJECTION INTRAMUSCULAR; INTRAVENOUS at 05:19

## 2024-05-05 RX ADMIN — WATER 40 MG: 1 INJECTION INTRAMUSCULAR; INTRAVENOUS; SUBCUTANEOUS at 04:30

## 2024-05-05 RX ADMIN — MELATONIN 3 MG: at 21:36

## 2024-05-05 RX ADMIN — Medication 2 UNITS: at 11:38

## 2024-05-05 RX ADMIN — BENZONATATE 100 MG: 100 CAPSULE ORAL at 13:37

## 2024-05-05 RX ADMIN — WATER 40 MG: 1 INJECTION INTRAMUSCULAR; INTRAVENOUS; SUBCUTANEOUS at 11:36

## 2024-05-05 RX ADMIN — SODIUM CHLORIDE 40 MG: 9 INJECTION INTRAMUSCULAR; INTRAVENOUS; SUBCUTANEOUS at 21:33

## 2024-05-05 RX ADMIN — SODIUM CHLORIDE 35 ML: 9 INJECTION, SOLUTION INTRAVENOUS at 21:49

## 2024-05-05 RX ADMIN — PANTOPRAZOLE SODIUM 40 MG: 40 TABLET, DELAYED RELEASE ORAL at 08:17

## 2024-05-05 RX ADMIN — CARVEDILOL 25 MG: 12.5 TABLET, FILM COATED ORAL at 08:16

## 2024-05-05 RX ADMIN — LINEZOLID 600 MG: 600 INJECTION, SOLUTION INTRAVENOUS at 08:22

## 2024-05-05 RX ADMIN — Medication: at 21:57

## 2024-05-05 RX ADMIN — ASPIRIN 81 MG: 81 TABLET, CHEWABLE ORAL at 08:16

## 2024-05-05 RX ADMIN — CARVEDILOL 25 MG: 12.5 TABLET, FILM COATED ORAL at 16:35

## 2024-05-05 RX ADMIN — INSULIN HUMAN 11 UNITS: 100 INJECTION, SUSPENSION SUBCUTANEOUS at 11:37

## 2024-05-05 RX ADMIN — CLONAZEPAM 0.5 MG: 0.5 TABLET ORAL at 21:36

## 2024-05-05 RX ADMIN — SODIUM CHLORIDE, PRESERVATIVE FREE 10 ML: 5 INJECTION INTRAVENOUS at 21:38

## 2024-05-05 RX ADMIN — INSULIN HUMAN 11 UNITS: 100 INJECTION, SUSPENSION SUBCUTANEOUS at 05:03

## 2024-05-05 RX ADMIN — ISOSORBIDE MONONITRATE 60 MG: 30 TABLET, EXTENDED RELEASE ORAL at 08:17

## 2024-05-05 RX ADMIN — HEPARIN SODIUM 5000 UNITS: 5000 INJECTION INTRAVENOUS; SUBCUTANEOUS at 05:34

## 2024-05-05 RX ADMIN — INSULIN HUMAN 11 UNITS: 100 INJECTION, SUSPENSION SUBCUTANEOUS at 21:37

## 2024-05-05 RX ADMIN — BENZONATATE 100 MG: 100 CAPSULE ORAL at 21:36

## 2024-05-05 RX ADMIN — BENZONATATE 100 MG: 100 CAPSULE ORAL at 00:00

## 2024-05-05 ASSESSMENT — PAIN SCALES - GENERAL
PAINLEVEL_OUTOF10: 5
PAINLEVEL_OUTOF10: 0
PAINLEVEL_OUTOF10: 7

## 2024-05-05 ASSESSMENT — PAIN DESCRIPTION - ORIENTATION: ORIENTATION: RIGHT

## 2024-05-05 ASSESSMENT — PAIN DESCRIPTION - LOCATION
LOCATION: HEAD;BACK
LOCATION: CHEST

## 2024-05-05 ASSESSMENT — PAIN DESCRIPTION - DESCRIPTORS
DESCRIPTORS: ACHING
DESCRIPTORS: ACHING

## 2024-05-05 NOTE — PROGRESS NOTES
End of Shift Note    Bedside shift change report given to AGNES Corrales (oncoming nurse) by Vera Richardson RN (offgoing nurse).  Report included the following information SBAR, Kardex, and MAR    Shift worked:  Days     Shift summary and any significant changes:    Discussed with charge RN Kimberly regarding sitter at bedside. Per sitter, pt hasn't been pulling at lines at all. Sitter came out of RM at 1300 and is teching on the floor incase sitter is necessary again    Plan for HD tomorrow    PRN tylenol given x1   Concerns for physician to address:  -     Zone phone for oncoming shift:   -       Length of Stay:  Expected LOS: 12  Actual LOS: 10      Vera Richardson, RN

## 2024-05-05 NOTE — PROGRESS NOTES
End of Shift Note    Bedside shift change report given to  (oncoming nurse) by Madison Whitmore RN (offgoing nurse).  Report included the following information     Shift worked:  Sitter @ bedside for safety Pt tryst to remove PICC ,and attempts to touch both PICC and Tripp   Pt remains confused ,but less than Thursday night ,      Shift summary and any significant changes:          Concerns for physician to address:       Zone phone for oncoming shift:          Activity:     Number times ambulated in hallways past shift: 0  Number of times OOB to chair past shift: 0    Cardiac:   Cardiac Monitoring: Yes           Access:  Current line(s): PICC     Genitourinary:   Urinary status: external catheter    Respiratory:      Chronic home O2 use?: YES  Incentive spirometer at bedside: NO       GI:     Current diet:  ADULT DIET; Regular; 3 carb choices (45 gm/meal); Low Fat/Low Chol/High Fiber/KAREEM; Low Sodium (2 gm)  DIET ONE TIME MESSAGE;  Passing flatus: YES  Tolerating current diet: YES       Pain Management:   Patient states pain is manageable on current regimen: YES    Skin:     Interventions: specialty bed, float heels, increase time out of bed, foam dressing, limit briefs, internal/external urinary devices, and nutritional support    Patient Safety:  Fall Score:    Interventions: bed/chair alarm, assistive device (walker, cane. etc), gripper socks, pt to call before getting OOB, and stay with me (per policy)       Length of Stay:  Expected LOS: 12  Actual LOS: 9      Mdaison Whitmore RN

## 2024-05-05 NOTE — PROGRESS NOTES
Hospitalist Progress Note    NAME:   Marilee Oakes   : 1950   MRN: [id number]     Date/Time: 2024 12:40 PM  Patient PCP: No primary care provider on file.    Estimated discharge date:   Barriers: recovery after Orthopedic procedure, renal improvement, final antibiotic plan, Patient and family adamantly do not want patient to go back to [facility], started on HD on       Assessment / Plan:    MRSA wound infection  Recurrent UTI  Left ankle fracture s/p ORIF  Blood cultures taken from [facility] were positive for MRSA per MD at [facility], prompting prior admission. No blood cultures here are positive. Reportedly had rash with Daptomycin and now renal failure with Vancomycin.  Left leg bandaged.  Patient is afebrile, white count is normal, procalcitonin is negative.  - ID consulted, appreciate assistance  - Ceftaroline  - PICC line is present  - Ortho consulted, appreciate assistance  Status post surgery on   1. Left ankle excisional debridement of bone, soft tissue, tendon, muscle.     2. Left ankle removal of hardware.     3. Left ankle closed reduction and splinting.    ID initially recommended ceftaroline, switch to Zyvox  Awaiting final ID recommendations, discussed with ID     CHF with ICD - mild exacerbation, improving  CAD  A-fib with Watchman device  Continue with aspirin, Imdur, Coreg.  - BNP elevated, coarse lung sounds  - cpap overnight  - holding lasix with renal function declining    Acute on chronic renal failure   CKD 3 with baseline creatinine between 1.2-1.6.  Monitor patient telemetry.  Hold Bumex.  - lasix as above  Nephrology following, Cr uptrending, plan is to start HD on   Tripp placed on   Discussed with nephrology   Plan for 3rd session of HD on     COPD - exacerbation, improving  Chronic respiratory failure - 2L at baseline  - scheduled duonebs  - weaning steroids  - back to 2L which is her basline    Toxic metabolic encephalopathy -  97.8 °F (36.6 °C) Oral 80 18 97 % --   05/05/24 0332 -- -- -- -- -- 98 % --   05/05/24 0322 (!) 160/71 98.3 °F (36.8 °C) Oral 80 18 98 % --   05/05/24 0011 -- -- -- 80 12 98 % --   05/04/24 2222 114/88 97.5 °F (36.4 °C) Oral 100 21 99 % --   05/04/24 2047 -- -- -- -- -- 99 % --   05/04/24 1915 (!) 155/85 98.6 °F (37 °C) Oral 84 18 100 % --   05/04/24 1528 135/89 97.4 °F (36.3 °C) Oral -- -- 95 % --   05/04/24 1338 -- -- -- -- -- -- 1.753 m (5' 9\")           Intake/Output Summary (Last 24 hours) at 5/5/2024 1240  Last data filed at 5/5/2024 0840  Gross per 24 hour   Intake 1440 ml   Output 800 ml   Net 640 ml          I had a face to face encounter and independently examined this patient on 5/5/2024, as outlined below:  PHYSICAL EXAM:  General: Alert, cooperative  EENT:  EOMI. Anicteric sclerae.  Resp:  CTA bilaterally, no wheezing or rales.  No accessory muscle use  CV:  Regular  rate,  No edema  GI:  Soft, Non distended, Non tender.  +Bowel sounds  Neurologic:  Alert and oriented X 3, more alert, normal speech,   Skin:  No rashes.  No jaundice    Reviewed most current lab test results and cultures  YES  Reviewed most current radiology test results   YES  Review and summation of old records today    NO  Reviewed patient's current orders and MAR    YES  PMH/SH reviewed - no change compared to H&P    Procedures: see electronic medical records for all procedures/Xrays and details which were not copied into this note but were reviewed prior to creation of Plan.      LABS:  I reviewed today's most current labs and imaging studies.  Pertinent labs include:  Recent Labs     05/03/24  0500 05/04/24  0508 05/05/24  0509   WBC 5.5 4.4 5.0   HGB 8.2* 8.1* 8.2*   HCT 28.6* 28.3* 28.5*    145* 133*       Recent Labs     05/03/24  0500 05/04/24  0508 05/05/24  0509    139 138   K 3.9 3.4* 4.0    105 105   CO2 25 29 30   GLUCOSE 187* 131* 130*   BUN 63* 45* 35*   CREATININE 3.52* 2.70* 2.21*   CALCIUM 7.9* 7.9*

## 2024-05-05 NOTE — PROGRESS NOTES
Name: Marilee Oakes   MRN: [id number]  : 1950                                                           Assessment:            HILARY on CKD stage IIIb  Acute kidney injury grade 2 KDIGO classification  Urinary incontinence  ?  Urosepsis-positive for yeast; recurrent UTI  Chronic anemia  AE of COPD-mild  CHF with ICD  Afib with watchman device  L ankle fracture- s/p ORIF  MRSA wound infection- Bcx at [facility] for MRSA PTA Discussion:      Creatinine worsening (suspect ATN from infection/UTI/Afib + CRS )    UCPR 2.2 grams  CT A/P with no hydro  Known to have baseline CKD stage IIIb ,  lymphedema and type 2 diabetes without complications  Follows with Dr. Evangelista-baseline creatinine 1.3-1.7  No high residual on bladder scan         Plan:   Lasix on hold  Off of vanc  Antibiotics per ID  Monitor UOP  AM labs  Watch for renal recovery.    Had HD #1 on .  HD #3 on .  Hold HD today and reassess in AM.    Watch for renal recovery.        Discussed with patient.      Subjective:    No complaints.  Mildly confused.    Exam:  BP (!) 153/83   Pulse 80   Temp 97.8 °F (36.6 °C) (Oral)   Resp 21   Ht 1.753 m (5' 9\")   Wt 109 kg (240 lb 4.8 oz)   SpO2 99%   BMI 35.49 kg/m²     WB/WN, NAD  rhonchi  Irregular rhythm, distant  Trace edema      Labs/Data:    Lab Results   Component Value Date    WBC 5.0 2024    HGB 8.2 (L) 2024    HCT 28.5 (L) 2024    MCV 84.6 2024     (L) 2024       Lab Results   Component Value Date/Time     2024 05:09 AM    K 4.0 2024 05:09 AM     2024 05:09 AM    CO2 30 2024 05:09 AM    BUN 35 2024 05:09 AM    CREATININE 2.21 2024 05:09 AM    GLUCOSE 130 2024 05:09 AM    CALCIUM 7.9 2024 05:09 AM    LABGLOM 23 2024 05:09 AM    LABGLOM 11 2024 03:50

## 2024-05-05 NOTE — PROGRESS NOTES
End of Shift Note    Bedside shift change report given to  (oncoming nurse) by Madison Whitmore RN (offgoing nurse).  Report included the following information     Shift worked:  Sitter @ bedside for safety Pt tryst to remove PICC ,and attempts to touch both PICC and Tripp   Pt remains confused ,but less than Thursday night ,   0600 Pt remains confused sitter remains at bedside I would suggest  she still needs sitter for safety and today when she is fully awake to reevaluate need for sitter       Shift summary and any significant changes:          Concerns for physician to address:       Zone phone for oncoming shift:          Activity:     Number times ambulated in hallways past shift: 0  Number of times OOB to chair past shift: 0    Cardiac:   Cardiac Monitoring: Yes           Access:  Current line(s): PICC     Genitourinary:   Urinary status: external catheter    Respiratory:      Chronic home O2 use?: YES  Incentive spirometer at bedside: NO       GI:     Current diet:  ADULT DIET; Regular; 3 carb choices (45 gm/meal); Low Fat/Low Chol/High Fiber/KAREEM; Low Sodium (2 gm)  DIET ONE TIME MESSAGE;  Passing flatus: YES  Tolerating current diet: YES       Pain Management:   Patient states pain is manageable on current regimen: YES    Skin:     Interventions: specialty bed, float heels, increase time out of bed, foam dressing, limit briefs, internal/external urinary devices, and nutritional support    Patient Safety:  Fall Score:    Interventions: bed/chair alarm, assistive device (walker, cane. etc), gripper socks, pt to call before getting OOB, and stay with me (per policy)       Length of Stay:  Expected LOS: 12  Actual LOS: 9      Madison Whitmore RN

## 2024-05-05 NOTE — PLAN OF CARE
Problem: Skin/Tissue Integrity  Goal: Absence of new skin breakdown  Description: 1.  Monitor for areas of redness and/or skin breakdown  2.  Assess vascular access sites hourly  3.  Every 4-6 hours minimum:  Change oxygen saturation probe site  4.  Every 4-6 hours:  If on nasal continuous positive airway pressure, respiratory therapy assess nares and determine need for appliance change or resting period.  Outcome: Progressing     Problem: Safety - Adult  Goal: Free from fall injury  Outcome: Progressing     Problem: ABCDS Injury Assessment  Goal: Absence of physical injury  Outcome: Progressing     Problem: Chronic Conditions and Co-morbidities  Goal: Patient's chronic conditions and co-morbidity symptoms are monitored and maintained or improved  Outcome: Progressing     Problem: Respiratory - Adult  Goal: Achieves optimal ventilation and oxygenation  5/4/2024 2032 by Madison Whitmore, RN  Outcome: Progressing  5/4/2024 1700 by Natalia Castillo, RN  Outcome: Progressing     Problem: Pain  Goal: Verbalizes/displays adequate comfort level or baseline comfort level  Outcome: Progressing     Problem: Nutrition Deficit:  Goal: Optimize nutritional status  Outcome: Progressing

## 2024-05-06 LAB
ANION GAP SERPL CALC-SCNC: 3 MMOL/L (ref 5–15)
BASOPHILS # BLD: 0 K/UL (ref 0–0.1)
BASOPHILS NFR BLD: 0 % (ref 0–1)
BUN SERPL-MCNC: 42 MG/DL (ref 6–20)
BUN/CREAT SERPL: 16 (ref 12–20)
CALCIUM SERPL-MCNC: 8.3 MG/DL (ref 8.5–10.1)
CHLORIDE SERPL-SCNC: 104 MMOL/L (ref 97–108)
CO2 SERPL-SCNC: 30 MMOL/L (ref 21–32)
CREAT SERPL-MCNC: 2.57 MG/DL (ref 0.55–1.02)
DIFFERENTIAL METHOD BLD: ABNORMAL
EKG ATRIAL RATE: 58 BPM
EKG DIAGNOSIS: NORMAL
EKG Q-T INTERVAL: 434 MS
EKG QRS DURATION: 144 MS
EKG QTC CALCULATION (BAZETT): 500 MS
EKG R AXIS: 261 DEGREES
EKG T AXIS: 69 DEGREES
EKG VENTRICULAR RATE: 80 BPM
EOSINOPHIL # BLD: 0 K/UL (ref 0–0.4)
EOSINOPHIL NFR BLD: 0 % (ref 0–7)
ERYTHROCYTE [DISTWIDTH] IN BLOOD BY AUTOMATED COUNT: 17.4 % (ref 11.5–14.5)
GLUCOSE BLD STRIP.AUTO-MCNC: 174 MG/DL (ref 65–117)
GLUCOSE BLD STRIP.AUTO-MCNC: 222 MG/DL (ref 65–117)
GLUCOSE BLD STRIP.AUTO-MCNC: 225 MG/DL (ref 65–117)
GLUCOSE BLD STRIP.AUTO-MCNC: 266 MG/DL (ref 65–117)
GLUCOSE SERPL-MCNC: 223 MG/DL (ref 65–100)
HCT VFR BLD AUTO: 27.5 % (ref 35–47)
HGB BLD-MCNC: 8 G/DL (ref 11.5–16)
IMM GRANULOCYTES # BLD AUTO: 0.1 K/UL (ref 0–0.04)
IMM GRANULOCYTES NFR BLD AUTO: 1 % (ref 0–0.5)
LYMPHOCYTES # BLD: 0.3 K/UL (ref 0.8–3.5)
LYMPHOCYTES NFR BLD: 5 % (ref 12–49)
MAGNESIUM SERPL-MCNC: 1.7 MG/DL (ref 1.6–2.4)
MCH RBC QN AUTO: 24.4 PG (ref 26–34)
MCHC RBC AUTO-ENTMCNC: 29.1 G/DL (ref 30–36.5)
MCV RBC AUTO: 83.8 FL (ref 80–99)
MONOCYTES # BLD: 0.1 K/UL (ref 0–1)
MONOCYTES NFR BLD: 1 % (ref 5–13)
NEUTS SEG # BLD: 5.1 K/UL (ref 1.8–8)
NEUTS SEG NFR BLD: 93 % (ref 32–75)
NRBC # BLD: 0 K/UL (ref 0–0.01)
NRBC BLD-RTO: 0 PER 100 WBC
PHOSPHATE SERPL-MCNC: 5.2 MG/DL (ref 2.6–4.7)
PLATELET # BLD AUTO: 124 K/UL (ref 150–400)
PMV BLD AUTO: 11.1 FL (ref 8.9–12.9)
POTASSIUM SERPL-SCNC: 4.1 MMOL/L (ref 3.5–5.1)
RBC # BLD AUTO: 3.28 M/UL (ref 3.8–5.2)
RBC MORPH BLD: ABNORMAL
SERVICE CMNT-IMP: ABNORMAL
SODIUM SERPL-SCNC: 137 MMOL/L (ref 136–145)
WBC # BLD AUTO: 5.6 K/UL (ref 3.6–11)

## 2024-05-06 PROCEDURE — 97535 SELF CARE MNGMENT TRAINING: CPT | Performed by: OCCUPATIONAL THERAPIST

## 2024-05-06 PROCEDURE — 6360000002 HC RX W HCPCS: Performed by: INTERNAL MEDICINE

## 2024-05-06 PROCEDURE — 6370000000 HC RX 637 (ALT 250 FOR IP): Performed by: STUDENT IN AN ORGANIZED HEALTH CARE EDUCATION/TRAINING PROGRAM

## 2024-05-06 PROCEDURE — 97110 THERAPEUTIC EXERCISES: CPT

## 2024-05-06 PROCEDURE — 2700000000 HC OXYGEN THERAPY PER DAY

## 2024-05-06 PROCEDURE — 6370000000 HC RX 637 (ALT 250 FOR IP): Performed by: INTERNAL MEDICINE

## 2024-05-06 PROCEDURE — 6370000000 HC RX 637 (ALT 250 FOR IP)

## 2024-05-06 PROCEDURE — 82962 GLUCOSE BLOOD TEST: CPT

## 2024-05-06 PROCEDURE — 2580000003 HC RX 258

## 2024-05-06 PROCEDURE — 85025 COMPLETE CBC W/AUTO DIFF WBC: CPT

## 2024-05-06 PROCEDURE — 2580000003 HC RX 258: Performed by: ANESTHESIOLOGY

## 2024-05-06 PROCEDURE — 80048 BASIC METABOLIC PNL TOTAL CA: CPT

## 2024-05-06 PROCEDURE — 83735 ASSAY OF MAGNESIUM: CPT

## 2024-05-06 PROCEDURE — 97530 THERAPEUTIC ACTIVITIES: CPT

## 2024-05-06 PROCEDURE — 97530 THERAPEUTIC ACTIVITIES: CPT | Performed by: OCCUPATIONAL THERAPIST

## 2024-05-06 PROCEDURE — 6360000002 HC RX W HCPCS

## 2024-05-06 PROCEDURE — 2580000003 HC RX 258: Performed by: INTERNAL MEDICINE

## 2024-05-06 PROCEDURE — 97116 GAIT TRAINING THERAPY: CPT

## 2024-05-06 PROCEDURE — 36415 COLL VENOUS BLD VENIPUNCTURE: CPT

## 2024-05-06 PROCEDURE — 2060000000 HC ICU INTERMEDIATE R&B

## 2024-05-06 PROCEDURE — 94640 AIRWAY INHALATION TREATMENT: CPT

## 2024-05-06 PROCEDURE — 84100 ASSAY OF PHOSPHORUS: CPT

## 2024-05-06 RX ADMIN — ACETAMINOPHEN 650 MG: 325 TABLET ORAL at 10:23

## 2024-05-06 RX ADMIN — ONDANSETRON 4 MG: 2 INJECTION INTRAMUSCULAR; INTRAVENOUS at 12:12

## 2024-05-06 RX ADMIN — ASPIRIN 81 MG: 81 TABLET, CHEWABLE ORAL at 10:06

## 2024-05-06 RX ADMIN — LINEZOLID 600 MG: 600 INJECTION, SOLUTION INTRAVENOUS at 10:12

## 2024-05-06 RX ADMIN — WATER 40 MG: 1 INJECTION INTRAMUSCULAR; INTRAVENOUS; SUBCUTANEOUS at 12:13

## 2024-05-06 RX ADMIN — BENZONATATE 100 MG: 100 CAPSULE ORAL at 15:09

## 2024-05-06 RX ADMIN — SODIUM CHLORIDE: 9 INJECTION, SOLUTION INTRAVENOUS at 10:11

## 2024-05-06 RX ADMIN — Medication 2 UNITS: at 17:26

## 2024-05-06 RX ADMIN — HYDRALAZINE HYDROCHLORIDE 10 MG: 20 INJECTION, SOLUTION INTRAMUSCULAR; INTRAVENOUS at 11:40

## 2024-05-06 RX ADMIN — HEPARIN SODIUM 5000 UNITS: 5000 INJECTION INTRAVENOUS; SUBCUTANEOUS at 15:09

## 2024-05-06 RX ADMIN — MELATONIN 3 MG: at 20:54

## 2024-05-06 RX ADMIN — Medication 4 UNITS: at 12:13

## 2024-05-06 RX ADMIN — INSULIN HUMAN 11 UNITS: 100 INJECTION, SUSPENSION SUBCUTANEOUS at 05:26

## 2024-05-06 RX ADMIN — ARFORMOTEROL TARTRATE: 15 SOLUTION RESPIRATORY (INHALATION) at 07:14

## 2024-05-06 RX ADMIN — CLONAZEPAM 0.5 MG: 0.5 TABLET ORAL at 20:54

## 2024-05-06 RX ADMIN — PREGABALIN 150 MG: 75 CAPSULE ORAL at 20:54

## 2024-05-06 RX ADMIN — WATER 40 MG: 1 INJECTION INTRAMUSCULAR; INTRAVENOUS; SUBCUTANEOUS at 05:26

## 2024-05-06 RX ADMIN — IPRATROPIUM BROMIDE AND ALBUTEROL SULFATE 1 DOSE: .5; 3 SOLUTION RESPIRATORY (INHALATION) at 07:08

## 2024-05-06 RX ADMIN — CARVEDILOL 25 MG: 12.5 TABLET, FILM COATED ORAL at 17:24

## 2024-05-06 RX ADMIN — SODIUM CHLORIDE, PRESERVATIVE FREE 10 ML: 5 INJECTION INTRAVENOUS at 10:14

## 2024-05-06 RX ADMIN — HEPARIN SODIUM 5000 UNITS: 5000 INJECTION INTRAVENOUS; SUBCUTANEOUS at 20:54

## 2024-05-06 RX ADMIN — BENZONATATE 100 MG: 100 CAPSULE ORAL at 20:54

## 2024-05-06 RX ADMIN — INSULIN HUMAN 11 UNITS: 100 INJECTION, SUSPENSION SUBCUTANEOUS at 12:13

## 2024-05-06 RX ADMIN — PANTOPRAZOLE SODIUM 40 MG: 40 TABLET, DELAYED RELEASE ORAL at 06:53

## 2024-05-06 RX ADMIN — Medication: at 10:14

## 2024-05-06 RX ADMIN — CARVEDILOL 25 MG: 12.5 TABLET, FILM COATED ORAL at 10:06

## 2024-05-06 RX ADMIN — BENZONATATE 100 MG: 100 CAPSULE ORAL at 10:06

## 2024-05-06 RX ADMIN — LINEZOLID 600 MG: 600 INJECTION, SOLUTION INTRAVENOUS at 21:04

## 2024-05-06 RX ADMIN — SODIUM CHLORIDE, PRESERVATIVE FREE 10 ML: 5 INJECTION INTRAVENOUS at 21:04

## 2024-05-06 RX ADMIN — HEPARIN SODIUM 5000 UNITS: 5000 INJECTION INTRAVENOUS; SUBCUTANEOUS at 05:26

## 2024-05-06 RX ADMIN — IPRATROPIUM BROMIDE AND ALBUTEROL SULFATE 1 DOSE: .5; 3 SOLUTION RESPIRATORY (INHALATION) at 20:58

## 2024-05-06 RX ADMIN — ARFORMOTEROL TARTRATE: 15 SOLUTION RESPIRATORY (INHALATION) at 21:03

## 2024-05-06 RX ADMIN — Medication: at 21:04

## 2024-05-06 RX ADMIN — SODIUM CHLORIDE 35 ML: 9 INJECTION, SOLUTION INTRAVENOUS at 21:02

## 2024-05-06 RX ADMIN — ISOSORBIDE MONONITRATE 60 MG: 30 TABLET, EXTENDED RELEASE ORAL at 10:09

## 2024-05-06 RX ADMIN — EPOETIN ALFA-EPBX 10000 UNITS: 10000 INJECTION, SOLUTION INTRAVENOUS; SUBCUTANEOUS at 20:55

## 2024-05-06 ASSESSMENT — PAIN SCALES - GENERAL
PAINLEVEL_OUTOF10: 5
PAINLEVEL_OUTOF10: 0
PAINLEVEL_OUTOF10: 0

## 2024-05-06 ASSESSMENT — PAIN - FUNCTIONAL ASSESSMENT: PAIN_FUNCTIONAL_ASSESSMENT: ACTIVITIES ARE NOT PREVENTED

## 2024-05-06 ASSESSMENT — PAIN DESCRIPTION - LOCATION: LOCATION: BACK

## 2024-05-06 ASSESSMENT — PAIN DESCRIPTION - DESCRIPTORS: DESCRIPTORS: ACHING

## 2024-05-06 ASSESSMENT — PAIN DESCRIPTION - ORIENTATION: ORIENTATION: LOWER

## 2024-05-06 NOTE — PLAN OF CARE
Problem: Occupational Therapy - Adult  Goal: By Discharge: Performs self-care activities at highest level of function for planned discharge setting.  See evaluation for individualized goals.  Description: FUNCTIONAL STATUS PRIOR TO ADMISSION:  Patient is a questionable historian, admitted from [facility] rehab. Note recent hospitalization and L ankle ORIF (3/23/24), discharging to [facility] with NWB LLE precautions. Prior to initial injury, patient was modified independent with ADLs and functional mobility using rollator.     Receives Help From: Family, ADL Assistance: Needs assistance, Homemaking Assistance: Needs assistance, Ambulation Assistance: Needs assistance, Active : No     HOME SUPPORT: Patient lived with supportive spouse, however admitted from [facility].    Occupational Therapy Goals:  Initiated 4/26/2024, goals remain appropriate as per 5/3 weekly reevaluation.   1.  Patient will perform adhere to NWB LLE throughout functional activity/mobility with minimal verbal cues within 7 day(s).  2.  Patient will perform upper body dressing and bathing with Supervision within 7 day(s).  3.  Patient will perform seated lower body bathing using lateral weight-shifting technique with Minimal Assist within 7 day(s).  4.  Patient will perform lateral toilet transfers to bariatric drop-arm C with Supervision / Set-up within 7 day(s).  5.  Patient will perform all aspects of seated toileting with Minimal Assist within 7 day(s).  6.  Patient will participate in upper extremity therapeutic exercise/activities with Supervision for 10 minutes within 7 day(s).    7.  Patient will utilize energy conservation techniques during functional activities with verbal cues within 7 day(s).  Outcome: Progressing     OCCUPATIONAL THERAPY TREATMENT  Patient: Marilee Oakes (73 y.o. female)  Date: 5/6/2024  Primary Diagnosis: HILARY (acute kidney injury) (HCC) [N17.9]  Anemia, unspecified type [D64.9]  Procedure(s) (LRB):  ANKLE INCISION AND  frequent rest breaks, and SpO2 stable on room air      After treatment:   Patient left in no apparent distress in bed, Call bell within reach, and Caregiver / family present    COMMUNICATION/EDUCATION:   The patient's plan of care was discussed with: registered nurse         Thank you for this referral.  Tom Vargas, OTR/L  Minutes: 41

## 2024-05-06 NOTE — SIGNIFICANT EVENT
RAPID RESPONSE TEAM NOTE:    2111: Responded to overhead rapid response for chest pain. Patient is admitted with wound infection and UTI as well as mild CHF and COPD exacerbation.    Assessment:  Upon arrival to bedside, patient is awake, alert, oriented, and following commands. Complaining of right-sided chest pain that radiates to her back. Patient currently rates pain at 5/10. Patient denies shortness of breath or lightheadedness.    Vital signs as follows: HR: 80, BP: 144/76 (94), RR: 16, SpO2: 97% on 2L NC.    Interventions:  [place], NP at bedside, received orders for:    - EKG  - CBC, BMP, Mg, and Troponin levels  - CXR  - Protonix 40 mg IVP    Outcome:  EKG shows no changes, patient states chest pain has now resolved on its own. CXR completed. Labs sent.    Patient to remain in room at this time    Please call with any questions or concerns  Rebeka Page RN  RRT [phone]

## 2024-05-06 NOTE — SIGNIFICANT EVENT
Nocturnist/cross cover provider called to room at approximately 2111 as RRT was called for chest pain.     Patient Vitals for the past 8 hrs:   BP Temp Temp src Pulse Resp SpO2   05/05/24 1915 (!) 140/93 98.5 °F (36.9 °C) Oral 80 17 99 %   05/05/24 1635 (!) 152/76 -- -- 80 -- --   05/05/24 1430 (!) 143/79 98.3 °F (36.8 °C) Oral 80 20 100 %       Intake/Output Summary (Last 24 hours) at 5/5/2024 2118  Last data filed at 5/5/2024 1453  Gross per 24 hour   Intake 850 ml   Output 950 ml   Net -100 ml         BACKGROUND/ SITUATION:  Attending provider following patient: Dr Lopez    Admitted with MRSA wound infection left ankle s/p ORIF, recurrent UTI, s/p 4/30 ankle hardware excision and debridement, ID following case and adjusting antbx, CHF w/ICD w/ mild exacerbation- improving, CAD, afib w/watchman device, acute on chronic renal failure, ckd stage 3 at baseline, copd exacerbation- improving, chronic resp failure on 2lpm at baseline, toxic metabolic encephalopathy- improving, intermittent confusion and agitation at night, possible euthyroid sick syndrome, anemia, hypokalemia, T2DM w/hyperglycemia, DM neuropathy, anxiety, GERD.    FINDINGS:  On bedside exam pt reported pain to the right side of the chest, pain is reproducible to palpation, she reported some pain on palpation of the left chest as well. EKG shows upon my initial review no ischemia or ectopy- pending final cards review. Pt reported not really eating much, sister of pt at the bedside reported pt having a lot of anxiety, didn't sleep all day, which I did explain normal to sleep at night and not during the day so this is ok. Pt states having some difficulty to sleep at night, has melatonin to take and RECs she try this first and then we can consider something additional like atarax to help with anxiety/sleep. No c/o dyspnea, at her baseline 2lpm w/o reported increased o2 use. Pt reported pain 5/10 presently but states this is already starting to feel

## 2024-05-06 NOTE — CARE COORDINATION
Transition of Care Plan:     RUR: 29     Prior Level of Functioning: Pt has the following DME at home: hospital bed, shower chair, rollator, home oxygen ([employer] wheelchair, and sliding board. Spouse is main caregiver support Family support of two local daughters and two sisters.  Pt needs assistance with ADLs: Bathing, Housework, Shopping and Mobility.     Disposition:  IPR recommended     Pt has a new PCP appt scheduled or 6/21 with Dr. Mathur.  [facility] has a PCP that can sign HH orders until new PCP can be seen.   - .      If SNF or IPR: Date FOC offered: [facility] evaluating for admission/return  Date FOC received:   Accepting facility:   Date authorization started with reference number: no auth needed  Date authorization received and expires:      Follow up appointments:PCP: Kevan: 6/21/24  ID: Ned: 5/14 at 2 PM   DME needed: patient has multiple DME at home  Transportation at discharge: BLS transport will need to be arranged.   IM/IMM Medicare/ letter given: 2nd IM letter will need to be delivered.   Is patient a Skipwith and connected with [facility]? N/a              If yes, was Skipwith transfer form completed and [facility] notified?   Caregiver Contact: Spouse: Wesley Oakes; [phone]  Discharge Caregiver contacted prior to discharge? yes  Care Conference needed? N/a  Barriers to discharge: Renal Improvement         agreeing to have patient go to [facility]. [facility] will continue to evaluate for admission.  ABX IV to continue until 5/15  CM reviewed notes, Last PT OT notes from 5/3.  Will continue to work on DC planning, possible [facility]    CM addendum 11:29 discussed placement with . He will take patient home at time of DC. He will have HH set up with [facility]. Unknowns are related to dialysis and to IV abx set up. CM will  need this information asap to help determine DC needs.     English Melchor ALARCON CM  [phone]         Revision History

## 2024-05-06 NOTE — PROGRESS NOTES
Patient refused to wear BIPAP mask. Patient placed on 2LPM nasal cannula. Patient tried to take nasal cannula off again.

## 2024-05-06 NOTE — PLAN OF CARE
Problem: Physical Therapy - Adult  Goal: By Discharge: Performs mobility at highest level of function for planned discharge setting.  See evaluation for individualized goals.  Description: FUNCTIONAL STATUS PRIOR TO ADMISSION: The patient is a questionable historian. Presenting from [facility]. Multiple recent hospitalizations.  At [facility] for rehabilitation s/p fall with L ankle fx ORIF done on 3/23/24. pt is NWB and was working on scooting, lateral transfers, and standing at rehab in parallel bars to this point (per patient report). New surgery to remove L ankle hardware with I&D and application of wound vac dressing due to infection and failure of bones to heal on 4/30/24.    HOME SUPPORT PRIOR TO ADMISSION: The patient lived with spouse. Admitted from [facility].    Physical Therapy Goals  Initiated 4/26/2024; Re-assessed 5/3/24 - current goals still appropriate.  1.  Patient will move from supine to sit and sit to supine, scoot up and down, and roll side to side in bed with modified independence within 7 day(s).    2.  Patient will perform sit to stand with moderate assistance within 7 day(s).  3.  Patient will transfer from bed to chair and chair to bed with contact guard assist using the least restrictive device within 7 day(s).  Outcome: Progressing   PHYSICAL THERAPY TREATMENT    Patient: Marilee Oakes (73 y.o. female)  Date: 5/6/2024  Diagnosis: HILARY (acute kidney injury) ([facility]) [N17.9]  Anemia, unspecified type [D64.9] HILARY (acute kidney injury) ([facility])  Procedure(s) (LRB):  ANKLE INCISION AND DRAINAGE WITH HARDWARE REMOVAL (Left) 6 Days Post-Op  Precautions:  (falls, LLE NWB, HILARY)   Left Lower Extremity Weight Bearing: Non Weight Bearing                  ASSESSMENT:  Pt seen by PT Team and continues to demo slow progress toward PT POC goals. Pt's spouse present at start of session and reports likely preference to dc home with in home services. Pt presents as AO x4 with slowed response/task initiation. Pt is  Constant support (poor plus/fair minus)  Standing - Dynamic: Constant support;Poor     Ambulation/Gait Training:     Gait  Gait Training: Yes  Overall Level of Assistance: Maximum assistance  Distance (ft): 1 Feet  Assistive Device: Gait belt;Walker, rolling  Interventions: Tactile cues;Safety awareness training;Manual cues;Demonstration;Verbal cues (steadying assist, AD advance, weight shift, tactile/vcs)  Gait Abnormalities:  (unsteady step to gait, poor WB adherence)  Number of Stairs Trained: 0    Neuro Re-Education:                                                                                                                                                                                                                                                 [facility] AM-PAC®      Basic Mobility Inpatient Short Form (6-Clicks) Version 2  How much HELP from another person do you currently need... (If the patient hasn't done an activity recently, how much help from another person do you think they would need if they tried?) Total A Lot A Little None   1.  Turning from your back to your side while in a flat bed without using bedrails? []  1 [x]  2 []  3  []  4   2.  Moving from lying on your back to sitting on the side of a flat bed without using bedrails? []  1 [x]  2 []  3  []  4   3.  Moving to and from a bed to a chair (including a wheelchair)? []  1 [x]  2 []  3  []  4   4. Standing up from a chair using your arms (e.g. wheelchair or bedside chair)? []  1 [x]  2 []  3  []  4   5.  Walking in hospital room? []  1 [x]  2 []  3  []  4   6.  Climbing 3-5 steps with a railing? [x]  1 []  2 []  3  []  4     Raw Score: 11/24                            Cutoff score ?171,2,3 had higher odds of discharging home with home health or need of SNF/IPR.    1. Mariela Mendez, Марина Bonner, Angelo Capps, Radha Zendejas, Allen Franco, Audie Mendez.  Validity of the AM-PAC “6-Clicks” Inpatient Daily Activity

## 2024-05-06 NOTE — PLAN OF CARE
Problem: Discharge Planning  Goal: Discharge to home or other facility with appropriate resources  Outcome: Progressing     Problem: Skin/Tissue Integrity  Goal: Absence of new skin breakdown  Description: 1.  Monitor for areas of redness and/or skin breakdown  2.  Assess vascular access sites hourly  3.  Every 4-6 hours minimum:  Change oxygen saturation probe site  4.  Every 4-6 hours:  If on nasal continuous positive airway pressure, respiratory therapy assess nares and determine need for appliance change or resting period.  Outcome: Progressing     Problem: Safety - Adult  Goal: Free from fall injury  Outcome: Progressing     Problem: ABCDS Injury Assessment  Goal: Absence of physical injury  Outcome: Progressing     Problem: Chronic Conditions and Co-morbidities  Goal: Patient's chronic conditions and co-morbidity symptoms are monitored and maintained or improved  Outcome: Progressing     Problem: Respiratory - Adult  Goal: Achieves optimal ventilation and oxygenation  5/6/2024 1053 by Seema Seals, RN  Outcome: Progressing  5/6/2024 0728 by Naheed Kelley, RT  Outcome: Progressing     Problem: Pain  Goal: Verbalizes/displays adequate comfort level or baseline comfort level  Outcome: Progressing     Problem: Nutrition Deficit:  Goal: Optimize nutritional status  Outcome: Progressing

## 2024-05-06 NOTE — PROGRESS NOTES
ADULT PROTOCOL: JET AEROSOL ASSESSMENT    Patient  Marilee Oakes     73 y.o.   female     5/6/2024  7:29 AM    Breath Sounds Pre Procedure: Breath Sounds Pre-Tx ADELE: Diminished                                  Breath Sounds Pre-Tx LLL: Diminished        Breath Sounds Pre-Tx RUL: Diminished        Breath Sounds Pre-Tx RML: Diminished        Breath Sounds Pre-Tx RLL: Diminished  Breath Sounds Post Procedure: Breath Sounds Post-Tx ADELE: Diminished          Breath Sounds Post-Tx LLL: Diminished          Breath Sounds Post-Tx RUL: Diminished          Breath Sounds Post-Tx RML: Diminished          Breath Sounds Post-Tx RLL: Diminished                                     Heart Rate: Pre procedure Pre-Tx Pulse: 80           Post procedure Post-Tx Pulse: 80    Resp Rate: Pre procedure Pre-Tx Resps: 21           Post procedure Post-Tx Resps: 20    Oxygen: O2 Therapy: Oxygen humidified   nasal cannula     Changed: No    SpO2:  SpO2: 100 %   with Oxygen                Nebulizer Therapy: Current medications Medications: Budesonide/Formoterol      Changed: No        Problem List:   Patient Active Problem List   Diagnosis    Open wound of left lower leg    HBP (high blood pressure)    IBS (irritable bowel syndrome)    Foot pain, right    Ischemic cardiomyopathy    Anxiety    Ingrown toenail of left foot    Asthma    Polypharmacy    Long-term use of high-risk medication    Hypokalemia    Lethargy    Hypoglycemia    Acute exacerbation of COPD with asthma (HCC)    Obesity (BMI 30-39.9)    Hypoxia    Lower back pain    Chronic atrial fibrillation (HCC)    Hypercholesterolemia    Presence of Watchman left atrial appendage closure device    Atrial fibrillation (HCC)    Spinal stenosis, lumbar region, without neurogenic claudication    B12 deficiency    Lower abdominal pain    Type 2 diabetes mellitus with diabetic neuropathy, without long-term current use of insulin (HCC)    Right knee DJD    Recurrent UTI    Type 2 diabetes with  nephropathy (HCC)    Ataxia    Lower extremity edema    Venous stasis dermatitis of both lower extremities    Closed fracture of multiple ribs of right side with routine healing    S/P placement of cardiac pacemaker    Chronic cholecystitis    Cellulitis of left lower leg    Stage 3a chronic kidney disease ([facility])    Anxiety and depression    Chronic systolic heart failure ([facility])    Coronary artery disease due to lipid rich plaque    Intertriginous dermatitis associated with moisture    Acute respiratory failure with hypoxia (HCC)    Acute congestive heart failure, unspecified heart failure type ([facility])    Fever and chills    Nausea and vomiting    MRSA bacteremia    Ulcer of right foot with muscle involvement without evidence of necrosis ([facility])    Diabetic polyneuropathy associated with type 2 diabetes mellitus ([facility])    Poorly controlled diabetes mellitus ([facility])    Presence of combination internal cardiac defibrillator (ICD) and pacemaker    Endocarditis    Heart failure ([facility])    Closed bimalleolar fracture of left ankle    Infection of postoperative wound due to methicillin-resistant Staphylococcus aureus    Surgical site infection    Simple chronic bronchitis (HCC)    Hardware complicating wound infection ([facility])    HILARY (acute kidney injury) ([facility])    Antibiotic causing adverse effect    Yeast UTI    Chronic obstructive pulmonary disease ([facility])    Diabetes mellitus due to underlying condition, controlled, with complication ([facility])    Steroid-induced hyperglycemia    Dialysis patient ([facility])       Respiratory Therapist: Naheed Werner, RT

## 2024-05-06 NOTE — PROGRESS NOTES
0700: End of Shift Note    Bedside shift change report given to AGNES Shaw (oncoming nurse) by Savanna Zimmer RN (offgoing nurse).  Report included the following information SBAR, Kardex, ED Summary, Intake/Output, MAR, Recent Results, and Cardiac Rhythm Ventricular Paced    Shift worked:  1230-5926     Shift summary and any significant changes:    -PT fearful of not having sitter present. Pt requesting to have RN or a tech stay in the room with her overnight. Pt contacted sister asking her stay. Sister at bedside    -Rapid Response Called for pt complaint of right sided chest pain. EKG, labs and stat cxr ordered.    Concerns for physician to address: -0430: Pt had 8 beats Vtach. Asymptomatic with VSS     Zone phone for oncoming shift:  [phone]       Savanna Zimmer RN

## 2024-05-06 NOTE — PROGRESS NOTES
NAME: Marilee Oakes        :  1950        MRN:  [id number]          Assessment :    Plan:  HILARY on CKD stage 3b  Anemia  COPD  CHF with AICD  Afib w/ watchman  Left ankle fracture with infection  Recurrent uti HD initiated , last on ; creatinine now stable at ~ 2.6; making some urine; hold HD today; ATN; Upc ratio 2.2; No hydro    -160's/70-90's    Hgb < 10; give retactrit       Subjective:     Chief Complaint:  in bed. Alert. Not a great historian, but denies complaint. We discussed the above. Her  is at bedside.     Review of Systems:    Symptom Y/N Comments  Symptom Y/N Comments   Fever/Chills    Chest Pain     Poor Appetite    Edema     Cough    Abdominal Pain     Sputum    Joint Pain     SOB/NOVOA    Pruritis/Rash     Nausea/vomit    Tolerating PT/OT     Diarrhea    Tolerating Diet     Constipation    Other       Could not obtain due to:      Objective:     VITALS:   Last 24hrs VS reviewed since prior progress note. Most recent are:  Vitals:    24 0212   BP: 129/80   Pulse: 89   Resp: 15   Temp:    SpO2: 95%       Intake/Output Summary (Last 24 hours) at 2024 0547  Last data filed at 2024 2138  Gross per 24 hour   Intake 650 ml   Output 550 ml   Net 100 ml      Telemetry Reviewed:     PHYSICAL EXAM:  General: NAD      Lab Data Reviewed: (see below)    Medications Reviewed: (see below)    PMH/SH reviewed - no change compared to H&P  ________________________________________________________________________  Care Plan discussed with:  Patient     Family      RN     Care Manager                    Consultant:          Comments   >50% of visit spent in counseling and coordination of care       ________________________________________________________________________  Марина Evangelista MD     Procedures: see electronic medical records for all procedures/Xrays and details which  were not copied into this note but  Nightly    glucose chewable tablet 16 g  4 tablet Oral PRN    dextrose bolus 10% 125 mL  125 mL IntraVENous PRN    Or    dextrose bolus 10% 250 mL  250 mL IntraVENous PRN    glucagon injection 1 mg  1 mg SubCUTAneous PRN    dextrose 10 % infusion   IntraVENous Continuous PRN    insulin lispro (HUMALOG) injection vial 0-8 Units  0-8 Units SubCUTAneous TID WC    insulin lispro (HUMALOG) injection vial 0-4 Units  0-4 Units SubCUTAneous Nightly    albuterol (PROVENTIL) (2.5 MG/3ML) 0.083% nebulizer solution 2.5 mg  2.5 mg Nebulization Q2H PRN    ipratropium 0.5 mg-albuterol 2.5 mg (DUONEB) nebulizer solution 1 Dose  1 Dose Inhalation BID RT    [Held by provider] furosemide (LASIX) injection 20 mg  20 mg IntraVENous BID    sodium chloride flush 0.9 % injection 5-40 mL  5-40 mL IntraVENous 2 times per day    sodium chloride flush 0.9 % injection 5-40 mL  5-40 mL IntraVENous PRN    0.9 % sodium chloride infusion   IntraVENous PRN    ondansetron (ZOFRAN-ODT) disintegrating tablet 4 mg  4 mg Oral Q8H PRN    Or    ondansetron (ZOFRAN) injection 4 mg  4 mg IntraVENous Q6H PRN    polyethylene glycol (GLYCOLAX) packet 17 g  17 g Oral Daily PRN    acetaminophen (TYLENOL) tablet 650 mg  650 mg Oral Q6H PRN    Or    acetaminophen (TYLENOL) suppository 650 mg  650 mg Rectal Q6H PRN    melatonin tablet 3 mg  3 mg Oral Nightly    aluminum & magnesium hydroxide-simethicone (MAALOX) 200-200-20 MG/5ML suspension 30 mL  30 mL Oral Q6H PRN    aspirin chewable tablet 81 mg  81 mg Oral Daily    carvedilol (COREG) tablet 25 mg  25 mg Oral BID with meals    clonazePAM (KLONOPIN) tablet 0.5 mg  0.5 mg Oral Nightly    isosorbide mononitrate (IMDUR) extended release tablet 60 mg  60 mg Oral Daily    pantoprazole (PROTONIX) tablet 40 mg  40 mg Oral QAM AC    sodium chloride (OCEAN, BABY AYR) 0.65 % nasal spray 2 spray  2 spray Nasal Q8H PRN    arformoterol 15 mcg-budesonide 0.25 mg neb solution   Nebulization BID RT

## 2024-05-06 NOTE — PROGRESS NOTES
Rapid Response Chest Pain. Held Schedule breathing treatment at this ttime.  2126: Changed to a regular nasal cannula from a CO2 nasal cannula. Patient on 2LPM SPO2 99%.

## 2024-05-06 NOTE — PROGRESS NOTES
Hospitalist Progress Note    NAME:   Marilee Oakes   : 1950   MRN: [id number]     Date/Time: 2024 2:45 PM  Patient PCP: No primary care provider on file.    Estimated discharge date:   Barriers: recovery after Orthopedic procedure, renal improvement, final antibiotic plan, Patient and family adamantly do not want patient to go back to [facility], started on HD on       Assessment / Plan:    MRSA wound infection  Recurrent UTI  Left ankle fracture s/p ORIF  Blood cultures taken from [facility] were positive for MRSA per MD at [facility], prompting prior admission. No blood cultures here are positive. Reportedly had rash with Daptomycin and now renal failure with Vancomycin.  Left leg bandaged.  Patient is afebrile, white count is normal, procalcitonin is negative.  - ID consulted, appreciate assistance  - Ceftaroline  - PICC line is present  - Ortho consulted, appreciate assistance  Status post surgery on   1. Left ankle excisional debridement of bone, soft tissue, tendon, muscle.     2. Left ankle removal of hardware.     3. Left ankle closed reduction and splinting.    ID initially recommended ceftaroline, switch to Zyvox  Awaiting final ID recommendations, discussed with ID     CHF with ICD - mild exacerbation, improving  CAD  A-fib with Watchman device  Continue with aspirin, Imdur, Coreg.  - BNP elevated, coarse lung sounds  - cpap overnight  - holding lasix with renal function declining    Episode of chest pain   Pt c/o burning chest pain , seems to be reflux   Acute on chronic renal failure   CKD 3 with baseline creatinine between 1.2-1.6.  Monitor patient telemetry.  Hold Bumex.  - lasix as above  Nephrology following, Cr uptrending, plan is to start HD on   Tripp placed on   Discussed with nephrology    3rd session of HD on   Nephro to decide on long term hD needs   COPD - exacerbation, improving  Chronic respiratory failure - 2L at baseline  - scheduled  40* 42*   CREATININE 2.70* 2.21* 2.55* 2.57*   CALCIUM 7.9* 7.9* 7.6* 8.3*   MG 1.8 1.6 1.8 1.7   PHOS 4.8* 4.3  --  5.2*         Signed: Marie Lopez MD

## 2024-05-06 NOTE — PROGRESS NOTES
Pt seen by PT Team and continues to demo slow progress toward PT POC goals. Pt's spouse present at start of session and reports likely preference to dc home with in home services. Pt presents as AO x4 with slowed response/task initiation. Pt confirms Zayra with rollator/RW at baseline and resides in a 1SH with her spouse. Pt's two daughters will able be available to provide care if d/c'd to home per Pt/spousal report. With increased time/effort, most tasks performed with maxA including R/L roll, bed mobility, positioning, multiple functional sit <-> stand transfers and few steps at EOB. Pt demo difficulty with LLE NWB adherence despite repeat tactile/vcs and limb lift assist. Once in stand, significant R side lean with maxA therapist facilitated assist to maintain balance and fall risk avoidance. Pt reports, \"I guess I can tell I am leaning\". Pt encouraged to take seated rest break for safety. Prior to stand and immediately upon removal of bedding, therapist observed prior/ongoing fecal loss. Pt reports ability to 'know usually' if having a BM. Pt education to reinforce call bell use for hygiene/leo care assist. Coordinated care with RN Team who provided totalA for cleaning and chux change. Deferral of additional attempted OOB/GT activity per inability to safely tolerate and observed fatigue onset. Per PLOF and ongoing appreciable functional mobility, continued recommended dc to an inpatient rehab (IPR vs SNF) setting prior to return home. Pt's spouse reports displeasure with prior inpatient settings. Compassionate listening and education to discuss alternative site options with CM Team toward recovery and optimal safe transition to next level of care. Per Pt/spousal report, hospital bed, first level set up, w/c, RW, BSC, shower chair and family support already in place. Spouse reports, \"I have a gait belt and can do it\".  Pt receiving RN Team care when therapist departed. PT Team will follow. Full PT Note to

## 2024-05-06 NOTE — PLAN OF CARE
Problem: Discharge Planning  Goal: Discharge to home or other facility with appropriate resources  Outcome: Progressing  Flowsheets  Taken 5/5/2024 1915 by Savanna Zimmer, RN  Discharge to home or other facility with appropriate resources:   Identify barriers to discharge with patient and caregiver   Arrange for needed discharge resources and transportation as appropriate   Identify discharge learning needs (meds, wound care, etc)   Arrange for interpreters to assist at discharge as needed   Refer to discharge planning if patient needs post-hospital services based on physician order or complex needs related to functional status, cognitive ability or social support system  Taken 5/5/2024 0738 by Vera Richardson RN  Discharge to home or other facility with appropriate resources: Identify barriers to discharge with patient and caregiver     Problem: Skin/Tissue Integrity  Goal: Absence of new skin breakdown  Description: 1.  Monitor for areas of redness and/or skin breakdown  2.  Assess vascular access sites hourly  3.  Every 4-6 hours minimum:  Change oxygen saturation probe site  4.  Every 4-6 hours:  If on nasal continuous positive airway pressure, respiratory therapy assess nares and determine need for appliance change or resting period.  Outcome: Progressing     Problem: Safety - Adult  Goal: Free from fall injury  Outcome: Progressing     Problem: ABCDS Injury Assessment  Goal: Absence of physical injury  Outcome: Progressing     Problem: Chronic Conditions and Co-morbidities  Goal: Patient's chronic conditions and co-morbidity symptoms are monitored and maintained or improved  Outcome: Progressing  Flowsheets  Taken 5/5/2024 1915 by Savanna Zimmer, RN  Care Plan - Patient's Chronic Conditions and Co-Morbidity Symptoms are Monitored and Maintained or Improved:   Monitor and assess patient's chronic conditions and comorbid symptoms for stability, deterioration, or improvement   Collaborate with  Savanna RN  Verbalizes/displays adequate comfort level or baseline comfort level:   Encourage patient to monitor pain and request assistance   Administer analgesics based on type and severity of pain and evaluate response   Assess pain using appropriate pain scale   Implement non-pharmacological measures as appropriate and evaluate response   Consider cultural and social influences on pain and pain management   Notify Licensed Independent Practitioner if interventions unsuccessful or patient reports new pain  Taken 5/5/2024 0738 by Vera Richardson RN  Verbalizes/displays adequate comfort level or baseline comfort level:   Assess pain using appropriate pain scale   Encourage patient to monitor pain and request assistance   Administer analgesics based on type and severity of pain and evaluate response     Problem: Nutrition Deficit:  Goal: Optimize nutritional status  Outcome: Progressing

## 2024-05-06 NOTE — WOUND CARE
Wound care nurse consult for right foot ulcer.   Past Medical History:   Diagnosis Date    Age-related osteoporosis without current pathological fracture     Anxiety and depression 01/22/2018    Arthritis     OA    Asthma     CAD (coronary artery disease)     Chronic systolic heart failure (HCC)     CKD stage G3b/A1, GFR 30-44 and albumin creatinine ratio <30 mg/g (HCC)     Essential hypertension     GERD (gastroesophageal reflux disease)     History of diverticulitis     diverticulitis    History of echocardiogram 11/2021    LV: Estimated LVEF is 40 - 45%. Visually measured ejection fraction. Normal cavity size and wall thickness. Globally reduced systolic function.  LA: Moderately dilated left atrium. Left atrial appendage is completely occluded. A Watchman left atrial appendage occluder is present and completely occludes the appendage.    History of migraine     Hypercholesterolemia 01/22/2018    Irritable bowel syndrome     IBS, spinal stenosis    Long-term use of high-risk medication 01/22/2018    Lumbar spinal stenosis     Morbid (severe) obesity due to excess calories ([facility])     Neuropathy     Obesity (BMI 30-39.9) 04/30/2019    Obstructive sleep apnea     uses CPAP    Permanent atrial fibrillation ([facility])     Presence of implantable cardioverter-defibrillator (ICD)     Presence of Watchman left atrial appendage closure device     Type 2 diabetes mellitus with diabetic neuropathy, without long-term current use of insulin ([facility]) 06/05/2016    Venous insufficiency     Vitamin B12 deficiency      Past Surgical History:   Procedure Laterality Date    ANKLE FRACTURE SURGERY Left 3/23/2024    LEFT ANKLE OPEN REDUCTION INTERNAL FIXATION performed by Edgardo Farnsworth DO at [facility] MAIN OR    APPENDECTOMY      BREAST BIOPSY Left     about 4-5 years ago from 2022, neg    CHOLECYSTECTOMY  11/15/2018    COLONOSCOPY N/A 3/14/2018    COLONOSCOPY performed by Pepito Quintero MD at [facility] ENDOSCOPY    HYSTERECTOMY (CERVIX STATUS

## 2024-05-06 NOTE — PROGRESS NOTES
I have reviewed and am in agreement with the assessment and documentation as performed by my orientee, AGNES Espinal.

## 2024-05-07 LAB
ANION GAP SERPL CALC-SCNC: 4 MMOL/L (ref 5–15)
BASOPHILS # BLD: 0 K/UL (ref 0–0.1)
BASOPHILS NFR BLD: 0 % (ref 0–1)
BUN SERPL-MCNC: 55 MG/DL (ref 6–20)
BUN/CREAT SERPL: 20 (ref 12–20)
CALCIUM SERPL-MCNC: 8.7 MG/DL (ref 8.5–10.1)
CHLORIDE SERPL-SCNC: 107 MMOL/L (ref 97–108)
CO2 SERPL-SCNC: 29 MMOL/L (ref 21–32)
CREAT SERPL-MCNC: 2.77 MG/DL (ref 0.55–1.02)
DIFFERENTIAL METHOD BLD: ABNORMAL
EOSINOPHIL # BLD: 0 K/UL (ref 0–0.4)
EOSINOPHIL NFR BLD: 0 % (ref 0–7)
ERYTHROCYTE [DISTWIDTH] IN BLOOD BY AUTOMATED COUNT: 17.6 % (ref 11.5–14.5)
GLUCOSE BLD STRIP.AUTO-MCNC: 102 MG/DL (ref 65–117)
GLUCOSE BLD STRIP.AUTO-MCNC: 226 MG/DL (ref 65–117)
GLUCOSE BLD STRIP.AUTO-MCNC: 235 MG/DL (ref 65–117)
GLUCOSE BLD STRIP.AUTO-MCNC: 291 MG/DL (ref 65–117)
GLUCOSE SERPL-MCNC: 119 MG/DL (ref 65–100)
HCT VFR BLD AUTO: 33.6 % (ref 35–47)
HGB BLD-MCNC: 9.6 G/DL (ref 11.5–16)
IMM GRANULOCYTES # BLD AUTO: 0 K/UL (ref 0–0.04)
IMM GRANULOCYTES NFR BLD AUTO: 1 % (ref 0–0.5)
LYMPHOCYTES # BLD: 0.2 K/UL (ref 0.8–3.5)
LYMPHOCYTES NFR BLD: 4 % (ref 12–49)
MAGNESIUM SERPL-MCNC: 1.7 MG/DL (ref 1.6–2.4)
MCH RBC QN AUTO: 24.7 PG (ref 26–34)
MCHC RBC AUTO-ENTMCNC: 28.6 G/DL (ref 30–36.5)
MCV RBC AUTO: 86.4 FL (ref 80–99)
MONOCYTES # BLD: 0.1 K/UL (ref 0–1)
MONOCYTES NFR BLD: 2 % (ref 5–13)
NEUTS SEG # BLD: 4.5 K/UL (ref 1.8–8)
NEUTS SEG NFR BLD: 93 % (ref 32–75)
NRBC # BLD: 0 K/UL (ref 0–0.01)
NRBC BLD-RTO: 0 PER 100 WBC
PHOSPHATE SERPL-MCNC: 5.3 MG/DL (ref 2.6–4.7)
PLATELET # BLD AUTO: 88 K/UL (ref 150–400)
PLATELET COMMENT: ABNORMAL
PMV BLD AUTO: 12.7 FL (ref 8.9–12.9)
POTASSIUM SERPL-SCNC: 4.1 MMOL/L (ref 3.5–5.1)
RBC # BLD AUTO: 3.89 M/UL (ref 3.8–5.2)
RBC MORPH BLD: ABNORMAL
SERVICE CMNT-IMP: ABNORMAL
SERVICE CMNT-IMP: NORMAL
SODIUM SERPL-SCNC: 140 MMOL/L (ref 136–145)
WBC # BLD AUTO: 4.8 K/UL (ref 3.6–11)

## 2024-05-07 PROCEDURE — 6370000000 HC RX 637 (ALT 250 FOR IP): Performed by: INTERNAL MEDICINE

## 2024-05-07 PROCEDURE — 6360000002 HC RX W HCPCS: Performed by: INTERNAL MEDICINE

## 2024-05-07 PROCEDURE — 85025 COMPLETE CBC W/AUTO DIFF WBC: CPT

## 2024-05-07 PROCEDURE — 2580000003 HC RX 258

## 2024-05-07 PROCEDURE — 2060000000 HC ICU INTERMEDIATE R&B

## 2024-05-07 PROCEDURE — 6370000000 HC RX 637 (ALT 250 FOR IP): Performed by: STUDENT IN AN ORGANIZED HEALTH CARE EDUCATION/TRAINING PROGRAM

## 2024-05-07 PROCEDURE — 82962 GLUCOSE BLOOD TEST: CPT

## 2024-05-07 PROCEDURE — 94640 AIRWAY INHALATION TREATMENT: CPT

## 2024-05-07 PROCEDURE — 2580000003 HC RX 258: Performed by: INTERNAL MEDICINE

## 2024-05-07 PROCEDURE — 36415 COLL VENOUS BLD VENIPUNCTURE: CPT

## 2024-05-07 PROCEDURE — 99233 SBSQ HOSP IP/OBS HIGH 50: CPT | Performed by: INTERNAL MEDICINE

## 2024-05-07 PROCEDURE — 97530 THERAPEUTIC ACTIVITIES: CPT

## 2024-05-07 PROCEDURE — 80048 BASIC METABOLIC PNL TOTAL CA: CPT

## 2024-05-07 PROCEDURE — 6360000002 HC RX W HCPCS

## 2024-05-07 PROCEDURE — 2700000000 HC OXYGEN THERAPY PER DAY

## 2024-05-07 PROCEDURE — 6370000000 HC RX 637 (ALT 250 FOR IP)

## 2024-05-07 PROCEDURE — 99231 SBSQ HOSP IP/OBS SF/LOW 25: CPT

## 2024-05-07 PROCEDURE — 83735 ASSAY OF MAGNESIUM: CPT

## 2024-05-07 PROCEDURE — 84100 ASSAY OF PHOSPHORUS: CPT

## 2024-05-07 RX ORDER — AMLODIPINE BESYLATE 5 MG/1
5 TABLET ORAL DAILY
Status: DISCONTINUED | OUTPATIENT
Start: 2024-05-07 | End: 2024-05-10 | Stop reason: HOSPADM

## 2024-05-07 RX ADMIN — BENZONATATE 100 MG: 100 CAPSULE ORAL at 20:24

## 2024-05-07 RX ADMIN — WATER 40 MG: 1 INJECTION INTRAMUSCULAR; INTRAVENOUS; SUBCUTANEOUS at 12:05

## 2024-05-07 RX ADMIN — HEPARIN SODIUM 5000 UNITS: 5000 INJECTION INTRAVENOUS; SUBCUTANEOUS at 06:34

## 2024-05-07 RX ADMIN — SODIUM CHLORIDE, PRESERVATIVE FREE 10 ML: 5 INJECTION INTRAVENOUS at 20:26

## 2024-05-07 RX ADMIN — HEPARIN SODIUM 5000 UNITS: 5000 INJECTION INTRAVENOUS; SUBCUTANEOUS at 14:47

## 2024-05-07 RX ADMIN — MELATONIN 3 MG: at 20:25

## 2024-05-07 RX ADMIN — ARFORMOTEROL TARTRATE: 15 SOLUTION RESPIRATORY (INHALATION) at 19:46

## 2024-05-07 RX ADMIN — LINEZOLID 600 MG: 600 INJECTION, SOLUTION INTRAVENOUS at 20:37

## 2024-05-07 RX ADMIN — IPRATROPIUM BROMIDE AND ALBUTEROL SULFATE 1 DOSE: .5; 3 SOLUTION RESPIRATORY (INHALATION) at 19:46

## 2024-05-07 RX ADMIN — ISOSORBIDE MONONITRATE 60 MG: 30 TABLET, EXTENDED RELEASE ORAL at 08:20

## 2024-05-07 RX ADMIN — CLONAZEPAM 0.5 MG: 0.5 TABLET ORAL at 20:24

## 2024-05-07 RX ADMIN — SODIUM CHLORIDE, PRESERVATIVE FREE 10 ML: 5 INJECTION INTRAVENOUS at 08:21

## 2024-05-07 RX ADMIN — HEPARIN SODIUM 5000 UNITS: 5000 INJECTION INTRAVENOUS; SUBCUTANEOUS at 23:09

## 2024-05-07 RX ADMIN — WATER 40 MG: 1 INJECTION INTRAMUSCULAR; INTRAVENOUS; SUBCUTANEOUS at 00:19

## 2024-05-07 RX ADMIN — Medication: at 08:31

## 2024-05-07 RX ADMIN — ASPIRIN 81 MG: 81 TABLET, CHEWABLE ORAL at 08:20

## 2024-05-07 RX ADMIN — PREGABALIN 150 MG: 75 CAPSULE ORAL at 20:25

## 2024-05-07 RX ADMIN — ACETAMINOPHEN 650 MG: 325 TABLET ORAL at 08:20

## 2024-05-07 RX ADMIN — INSULIN HUMAN 11 UNITS: 100 INJECTION, SUSPENSION SUBCUTANEOUS at 00:19

## 2024-05-07 RX ADMIN — CARVEDILOL 25 MG: 12.5 TABLET, FILM COATED ORAL at 18:20

## 2024-05-07 RX ADMIN — BENZONATATE 100 MG: 100 CAPSULE ORAL at 08:20

## 2024-05-07 RX ADMIN — CARVEDILOL 25 MG: 12.5 TABLET, FILM COATED ORAL at 08:20

## 2024-05-07 RX ADMIN — Medication: at 20:26

## 2024-05-07 RX ADMIN — AMLODIPINE BESYLATE 5 MG: 5 TABLET ORAL at 08:20

## 2024-05-07 RX ADMIN — Medication 4 UNITS: at 12:09

## 2024-05-07 RX ADMIN — PANTOPRAZOLE SODIUM 40 MG: 40 TABLET, DELAYED RELEASE ORAL at 08:20

## 2024-05-07 RX ADMIN — LINEZOLID 600 MG: 600 INJECTION, SOLUTION INTRAVENOUS at 08:26

## 2024-05-07 RX ADMIN — Medication 2 UNITS: at 18:41

## 2024-05-07 RX ADMIN — BENZONATATE 100 MG: 100 CAPSULE ORAL at 14:56

## 2024-05-07 RX ADMIN — IPRATROPIUM BROMIDE AND ALBUTEROL SULFATE 1 DOSE: .5; 3 SOLUTION RESPIRATORY (INHALATION) at 08:06

## 2024-05-07 RX ADMIN — INSULIN HUMAN 8 UNITS: 100 INJECTION, SUSPENSION SUBCUTANEOUS at 12:06

## 2024-05-07 RX ADMIN — ARFORMOTEROL TARTRATE: 15 SOLUTION RESPIRATORY (INHALATION) at 08:07

## 2024-05-07 ASSESSMENT — PAIN SCALES - GENERAL
PAINLEVEL_OUTOF10: 6
PAINLEVEL_OUTOF10: 0

## 2024-05-07 NOTE — PLAN OF CARE
Problem: Physical Therapy - Adult  Goal: By Discharge: Performs mobility at highest level of function for planned discharge setting.  See evaluation for individualized goals.  Description: FUNCTIONAL STATUS PRIOR TO ADMISSION: The patient is a questionable historian. Presenting from [facility]. Multiple recent hospitalizations.  At [facility] for rehabilitation s/p fall with L ankle fx ORIF done on 3/23/24. pt is NWB and was working on scooting, lateral transfers, and standing at rehab in parallel bars to this point (per patient report). New surgery to remove L ankle hardware with I&D and application of wound vac dressing due to infection and failure of bones to heal on 4/30/24.    HOME SUPPORT PRIOR TO ADMISSION: The patient lived with spouse. Admitted from [facility].    Physical Therapy Goals  Initiated 4/26/2024; Re-assessed 5/3/24 - current goals still appropriate.  1.  Patient will move from supine to sit and sit to supine, scoot up and down, and roll side to side in bed with modified independence within 7 day(s).    2.  Patient will perform sit to stand with moderate assistance within 7 day(s).  3.  Patient will transfer from bed to chair and chair to bed with contact guard assist using the least restrictive device within 7 day(s).  Outcome: Progressing   PHYSICAL THERAPY TREATMENT    Patient: Marilee Oakes (73 y.o. female)  Date: 5/7/2024  Diagnosis: HILARY (acute kidney injury) ([facility]) [N17.9]  Anemia, unspecified type [D64.9] HILARY (acute kidney injury) ([facility])  Procedure(s) (LRB):  ANKLE INCISION AND DRAINAGE WITH HARDWARE REMOVAL (Left) 7 Days Post-Op  Precautions:  (falls, LLE NWB, HILARY)   Left Lower Extremity Weight Bearing: Non Weight Bearing                  ASSESSMENT:  Patient continues to benefit from skilled PT services and is slowly progressing towards goals. Patient lying in bed sleeping when PT arrived. Came to sitting with minimum assistance and additional time. Sitting balance was good statically and fair

## 2024-05-07 NOTE — PLAN OF CARE
Problem: Discharge Planning  Goal: Discharge to home or other facility with appropriate resources  5/6/2024 2245 by Savanna Zimmer RN  Outcome: Progressing  Flowsheets (Taken 5/6/2024 1930)  Discharge to home or other facility with appropriate resources:   Identify barriers to discharge with patient and caregiver   Arrange for needed discharge resources and transportation as appropriate   Identify discharge learning needs (meds, wound care, etc)   Arrange for interpreters to assist at discharge as needed   Refer to discharge planning if patient needs post-hospital services based on physician order or complex needs related to functional status, cognitive ability or social support system  5/6/2024 1053 by Seema Seals RN  Outcome: Progressing     Problem: Skin/Tissue Integrity  Goal: Absence of new skin breakdown  Description: 1.  Monitor for areas of redness and/or skin breakdown  2.  Assess vascular access sites hourly  3.  Every 4-6 hours minimum:  Change oxygen saturation probe site  4.  Every 4-6 hours:  If on nasal continuous positive airway pressure, respiratory therapy assess nares and determine need for appliance change or resting period.  5/6/2024 2245 by Savanna Zimmer RN  Outcome: Progressing  5/6/2024 1053 by Seema Seals RN  Outcome: Progressing     Problem: Safety - Adult  Goal: Free from fall injury  5/6/2024 2245 by Savanna Zimmer RN  Outcome: Progressing  5/6/2024 1053 by Seema Seals RN  Outcome: Progressing     Problem: ABCDS Injury Assessment  Goal: Absence of physical injury  5/6/2024 2245 by Savanna Zimmer RN  Outcome: Progressing  5/6/2024 1053 by Seema Seals RN  Outcome: Progressing     Problem: Chronic Conditions and Co-morbidities  Goal: Patient's chronic conditions and co-morbidity symptoms are monitored and maintained or improved  5/6/2024 2245 by Savanna Zimmer RN  Outcome: Progressing  Flowsheets (Taken 5/6/2024 1930)  Care Plan - Patient's Chronic Conditions and  function for planned discharge setting.  See evaluation for individualized goals.  Description: FUNCTIONAL STATUS PRIOR TO ADMISSION:  Patient is a questionable historian, admitted from [facility] rehab. Note recent hospitalization and L ankle ORIF (3/23/24), discharging to [facility] with NWB LLE precautions. Prior to initial injury, patient was modified independent with ADLs and functional mobility using rollator.     Receives Help From: Family, ADL Assistance: Needs assistance, Homemaking Assistance: Needs assistance, Ambulation Assistance: Needs assistance, Active : No     HOME SUPPORT: Patient lived with supportive spouse, however admitted from [facility].    Occupational Therapy Goals:  Initiated 4/26/2024, goals remain appropriate as per 5/3 weekly reevaluation.   1.  Patient will perform adhere to NWB LLE throughout functional activity/mobility with minimal verbal cues within 7 day(s).  2.  Patient will perform upper body dressing and bathing with Supervision within 7 day(s).  3.  Patient will perform seated lower body bathing using lateral weight-shifting technique with Minimal Assist within 7 day(s).  4.  Patient will perform lateral toilet transfers to bariatric drop-arm [facility] with Supervision / Set-up within 7 day(s).  5.  Patient will perform all aspects of seated toileting with Minimal Assist within 7 day(s).  6.  Patient will participate in upper extremity therapeutic exercise/activities with Supervision for 10 minutes within 7 day(s).    7.  Patient will utilize energy conservation techniques during functional activities with verbal cues within 7 day(s).  5/6/2024 1726 by Tom Vargas, OTR/L  Outcome: Progressing     Problem: Respiratory - Adult  Goal: Achieves optimal ventilation and oxygenation  5/6/2024 2245 by Savanna Zimmer, RN  Outcome: Progressing  5/6/2024 2057 by Natalia Skaggs, RT  Outcome: Progressing  Flowsheets (Taken 5/6/2024 1930 by Savanna Zimmer, RN)  Achieves optimal ventilation and

## 2024-05-07 NOTE — CARE COORDINATION
CM Update: [facility] will follow patient at home for SN, OT, PT    English Melchor ALARCON CM   [id number]

## 2024-05-07 NOTE — PROGRESS NOTES
midnight, check another BMP tomorrow if creatinine trending up,will  place permacath for long-term HD  COPD - exacerbation, improving  Chronic respiratory failure - 2L at baseline  - scheduled duonebs  - weaning steroids  - back to 2L which is her basline    Toxic metabolic encephalopathy - improving  Intermittent confusion and agitation at night  CT head is negative.  Continue to monitor in telemetry with neurochecks every 4 hours.  Treat underlying cause.  - waxing and waning mental status, suspect delirium along with mild hypercarbia  - changed lyrica from bid to qhs, may be contributing  05/02: AAOx3 this am   05/03: Confused overnight, alert oriented this morning    Possible euthyroid sick syndrome  - low TSH and t4 likely due to infection  - repeat with PCP in 1 month     Anemia  Baseline hemoglobin between 8-9.  Currently hemoglobin is 7.5.  Occult blood was negative.  Monitor CBC.  Transfuse if hemoglobin is less than 7.0.      Hypokalemia  Diabetes mellitus with hyperglycemia  Neuropathy  Fingerstick glucose and sliding scale insulin.   Continue with Lyrica.  Blood sugar trending up with IV steroid, wean down the steroids  Appreciate diabetic management help  Anxiety  Continue with Klonopin.  - gave one dose of IV valium for acute anxiety 4/27     GERD  Continue with Protonix.         Medical Decision Making:   I personally reviewed labs: CBC, BMP  I personally reviewed imaging:   I personally reviewed EKG:   Toxic drug monitoring: H&H while patient is on Lovenox  Discussed case with:  RN      Updated patient  at bedside on 5/06, all questions answered     Code Status: Full code  DVT Prophylaxis: Resume Lovenox  Baseline: Unknown baseline, likely debilitated, coming from nursing home[facility]      Subjective:     Chief Complaint / Reason for Physician Visit  \"Followup HILARY\".  Discussed with RN events overnight.  No acute events  Objective:     VITALS:   Last 24hrs VS reviewed since prior    K 4.0 4.1 4.1 4.1    105 104 107   CO2 30 27 30 29   GLUCOSE 130* 251* 223* 119*   BUN 35* 40* 42* 55*   CREATININE 2.21* 2.55* 2.57* 2.77*   CALCIUM 7.9* 7.6* 8.3* 8.7   MG 1.6 1.8 1.7 1.7   PHOS 4.3  --  5.2* 5.3*         Signed: Marie Lopez MD

## 2024-05-07 NOTE — PROGRESS NOTES
Picc team unable to get another line.  Ok to remove the picc if PIV access.  Pt will discharge po and not IV abx.

## 2024-05-07 NOTE — PROGRESS NOTES
NAME: Marilee Oakes        :  1950        MRN:  [id number]          Assessment :    Plan:  HILARY on CKD stage 3b  Anemia  COPD  CHF with AICD  Afib w/ watchman  Left ankle fracture with infection  Recurrent uti HD initiated , last on ; creatinine 2.6 to 2.8; UOP 1100 ml; hold HD again today; ATN; Upc ratio 2.2; No hydro    -170's/70-90's - will add amlodipine 5 mg daily    Hgb < 10; continue retacrit    If creatinine is worse again tomorrow then will ask for a perm cath from IR and ask CM to work on outpatient HD.        Subjective:     Chief Complaint:  in bed. Alert. Not a great historian, but denies complaint. We discussed the above. Her  is at bedside.     Review of Systems:    Symptom Y/N Comments  Symptom Y/N Comments   Fever/Chills    Chest Pain     Poor Appetite    Edema     Cough    Abdominal Pain     Sputum    Joint Pain     SOB/NOVOA    Pruritis/Rash     Nausea/vomit    Tolerating PT/OT     Diarrhea    Tolerating Diet     Constipation    Other       Could not obtain due to:      Objective:     VITALS:   Last 24hrs VS reviewed since prior progress note. Most recent are:  Vitals:    24 0445   BP: (!) 155/95   Pulse: 80   Resp: 21   Temp: 98 °F (36.7 °C)   SpO2: 96%       Intake/Output Summary (Last 24 hours) at 2024 0602  Last data filed at 2024 2104  Gross per 24 hour   Intake 10 ml   Output 1100 ml   Net -1090 ml        Telemetry Reviewed:     PHYSICAL EXAM:  General: NAD      Lab Data Reviewed: (see below)    Medications Reviewed: (see below)    PMH/SH reviewed - no change compared to H&P  ________________________________________________________________________  Care Plan discussed with:  Patient     Family      RN     Care Manager                    Consultant:          Comments   >50% of visit spent in counseling and coordination of care    (porcine) injection 5,000 Units  5,000 Units SubCUTAneous 3 times per day    heparin 2 units/mL solution in 0.9% sodium chloride  200 mL Irrigation Once    linezolid (ZYVOX) IVPB 600 mg  600 mg IntraVENous Q12H    0.9 % sodium chloride infusion   IntraVENous PRN    pregabalin (LYRICA) capsule 150 mg  150 mg Oral Nightly    glucose chewable tablet 16 g  4 tablet Oral PRN    dextrose bolus 10% 125 mL  125 mL IntraVENous PRN    Or    dextrose bolus 10% 250 mL  250 mL IntraVENous PRN    glucagon injection 1 mg  1 mg SubCUTAneous PRN    dextrose 10 % infusion   IntraVENous Continuous PRN    insulin lispro (HUMALOG) injection vial 0-8 Units  0-8 Units SubCUTAneous TID WC    insulin lispro (HUMALOG) injection vial 0-4 Units  0-4 Units SubCUTAneous Nightly    albuterol (PROVENTIL) (2.5 MG/3ML) 0.083% nebulizer solution 2.5 mg  2.5 mg Nebulization Q2H PRN    ipratropium 0.5 mg-albuterol 2.5 mg (DUONEB) nebulizer solution 1 Dose  1 Dose Inhalation BID RT    [Held by provider] furosemide (LASIX) injection 20 mg  20 mg IntraVENous BID    sodium chloride flush 0.9 % injection 5-40 mL  5-40 mL IntraVENous 2 times per day    sodium chloride flush 0.9 % injection 5-40 mL  5-40 mL IntraVENous PRN    0.9 % sodium chloride infusion   IntraVENous PRN    ondansetron (ZOFRAN-ODT) disintegrating tablet 4 mg  4 mg Oral Q8H PRN    Or    ondansetron (ZOFRAN) injection 4 mg  4 mg IntraVENous Q6H PRN    polyethylene glycol (GLYCOLAX) packet 17 g  17 g Oral Daily PRN    acetaminophen (TYLENOL) tablet 650 mg  650 mg Oral Q6H PRN    Or    acetaminophen (TYLENOL) suppository 650 mg  650 mg Rectal Q6H PRN    melatonin tablet 3 mg  3 mg Oral Nightly    aluminum & magnesium hydroxide-simethicone (MAALOX) 200-200-20 MG/5ML suspension 30 mL  30 mL Oral Q6H PRN    aspirin chewable tablet 81 mg  81 mg Oral Daily    carvedilol (COREG) tablet 25 mg  25 mg Oral BID with meals    clonazePAM (KLONOPIN) tablet 0.5 mg  0.5 mg Oral Nightly    isosorbide

## 2024-05-07 NOTE — PROGRESS NOTES
systolic function.  LA: Moderately dilated left atrium. Left atrial appendage is completely occluded. A Watchman left atrial appendage occluder is present and completely occludes the appendage.    History of migraine     Hypercholesterolemia 01/22/2018    Irritable bowel syndrome     IBS, spinal stenosis    Long-term use of high-risk medication 01/22/2018    Lumbar spinal stenosis     Morbid (severe) obesity due to excess calories (HCC)     Neuropathy     Obesity (BMI 30-39.9) 04/30/2019    Obstructive sleep apnea     uses CPAP    Permanent atrial fibrillation (HCC)     Presence of implantable cardioverter-defibrillator (ICD)     Presence of Watchman left atrial appendage closure device     Type 2 diabetes mellitus with diabetic neuropathy, without long-term current use of insulin (HCC) 06/05/2016    Venous insufficiency     Vitamin B12 deficiency        Past Surgical History:   Procedure Laterality Date    ANKLE FRACTURE SURGERY Left 3/23/2024    LEFT ANKLE OPEN REDUCTION INTERNAL FIXATION performed by Edgardo Farnsworth DO at [facility] MAIN OR    APPENDECTOMY      BREAST BIOPSY Left     about 4-5 years ago from 2022, neg    CHOLECYSTECTOMY  11/15/2018    COLONOSCOPY N/A 3/14/2018    COLONOSCOPY performed by Pepito Quintero MD at [facility] ENDOSCOPY    HYSTERECTOMY (CERVIX STATUS UNKNOWN)      IR KYPHOPLASTY LUMBAR FIRST LEVEL  12/22/2020    IR KYPHOPLASTY LUMBAR FIRST LEVEL 12/22/2020 [facility] IR    IR KYPHOPLASTY LUMBAR FIRST LEVEL  12/22/2020    IR KYPHOPLASTY THORACIC FIRST LEVEL  1/6/2021    IR KYPHOPLASTY THORACIC FIRST LEVEL 1/6/2021 [facility]    IR KYPHOPLASTY THORACIC FIRST LEVEL  1/6/2021    IR NONTUNNELED VASCULAR CATHETER  5/2/2024    IR NONTUNNELED VASCULAR CATHETER 5/2/2024 Jeremy Oakes Jr., APRN - NP [facility] RAD [facility] IR    LEG SURGERY Left 4/30/2024    ANKLE INCISION AND DRAINAGE WITH HARDWARE REMOVAL performed by Edgardo Farnsworth DO at [facility] MAIN OR    [facility] ST[facility] LOC BREAST BIOPSY RIGHT  Right 5/10/2022    [facility] STEROTACTIC LOC BREAST BIOPSY RIGHT 5/10/2022 [facility]    PACEMAKER      TN UNLISTED PROCEDURE CARDIAC SURGERY      stent       Allergies   Allergen Reactions    Sulfa Antibiotics Swelling    Daptomycin Rash    Amoxicillin Swelling       Tobacco Use: Low Risk  (2024)    Patient History     Smoking Tobacco Use: Never     Smokeless Tobacco Use: Never     Passive Exposure: Not on file       Alcohol Use: Not At Risk (3/22/2024)    AUDIT-C     Frequency of Alcohol Consumption: Never     Average Number of Drinks: Patient does not drink     Frequency of Binge Drinking: Never         Family Status   Relation Name Status    Mother      Child  (Not Specified)    Brother  (Not Specified)    Sister  (Not Specified)    Father         Prior to Admission Medications   Prescriptions Last Dose Informant Patient Reported? Taking?   acetaminophen (TYLENOL) 500 MG tablet   Yes No   Sig: Take 1 tablet by mouth every 6 hours as needed   albuterol sulfate HFA (PROVENTIL;VENTOLIN;PROAIR) 108 (90 Base) MCG/ACT inhaler   Yes No   Sig: Inhale 1 puff into the lungs every 4 hours as needed   aspirin 81 MG chewable tablet   No No   Sig: Take 1 tablet by mouth daily   balsum peru-castor oil (VENELEX) OINT ointment   No No   Sig: Apply topically 2 times daily   budesonide-formoterol (SYMBICORT) 160-4.5 MCG/ACT AERO   No No   Sig: Inhale 2 puffs into the lungs 2 times daily   bumetanide (BUMEX) 2 MG tablet   No No   Sig: TAKE 1 TABLET EVERY DAY   carvedilol (COREG) 25 MG tablet   No No   Sig: TAKE 1 TABLET TWICE DAILY WITH MEALS   clonazePAM (KLONOPIN) 0.5 MG tablet   No No   Sig: Take 1 tablet by mouth nightly for 15 days. Max Daily Amount: 0.5 mg   clotrimazole-betamethasone (LOTRISONE) 1-0.05 % cream   Yes No   Sig: Apply topically 2 times daily   isosorbide mononitrate (IMDUR) 60 MG extended release tablet   No No   Sig: TAKE 1 TABLET EVERY DAY   magnesium oxide (MAG-OX) 400 MG tablet   Yes No

## 2024-05-07 NOTE — DIABETES MGMT
improving insulin sensitivity and action  Procedure for blood glucose monitoring & options for low-cost products  Medications plan at discharge     Billing Code(s)   [] 99326 IP subsequent hospital care - 50 minutes   [] 80738 IP subsequent hospital care - 35 minutes   [x] 91461 IP subsequent hospital care - 25 minutes   [] 16184 IP initial hospital care - 40 minutes     Before making these care recommendations, I personally reviewed the hospitalization record, including notes, laboratory & diagnostic data and current medications, and examined the patient at the bedside.  Total minutes: 25 minutes    LIZZY Samuel - CNS  Diabetes Clinical Nurse Specialist  [facility]  Access via [employer]

## 2024-05-07 NOTE — PROGRESS NOTES
0700: End of Shift Note    Bedside shift change report given to AGNES Coleman (oncoming nurse) by Savanna Zimmer RN (offgoing nurse).  Report included the following information SBAR, Kardex, Intake/Output, MAR, and Cardiac Rhythm Ventricular Paced    Shift worked:  [id number]     Shift summary and any significant changes:    -No insulin coverage overnight    -Pt placed on specialty bed.    Concerns for physician to address: -     Zone phone for oncoming shift:  [phone]       Savanna Zimmer RN

## 2024-05-08 LAB
ANION GAP SERPL CALC-SCNC: 3 MMOL/L (ref 5–15)
BASOPHILS # BLD: 0 K/UL (ref 0–0.1)
BASOPHILS NFR BLD: 0 % (ref 0–1)
BUN SERPL-MCNC: 61 MG/DL (ref 6–20)
BUN/CREAT SERPL: 21 (ref 12–20)
CALCIUM SERPL-MCNC: 8.5 MG/DL (ref 8.5–10.1)
CHLORIDE SERPL-SCNC: 106 MMOL/L (ref 97–108)
CO2 SERPL-SCNC: 29 MMOL/L (ref 21–32)
CREAT SERPL-MCNC: 2.91 MG/DL (ref 0.55–1.02)
DIFFERENTIAL METHOD BLD: ABNORMAL
EOSINOPHIL # BLD: 0 K/UL (ref 0–0.4)
EOSINOPHIL NFR BLD: 0 % (ref 0–7)
ERYTHROCYTE [DISTWIDTH] IN BLOOD BY AUTOMATED COUNT: 17.9 % (ref 11.5–14.5)
GLUCOSE BLD STRIP.AUTO-MCNC: 174 MG/DL (ref 65–117)
GLUCOSE BLD STRIP.AUTO-MCNC: 203 MG/DL (ref 65–117)
GLUCOSE BLD STRIP.AUTO-MCNC: 217 MG/DL (ref 65–117)
GLUCOSE BLD STRIP.AUTO-MCNC: 297 MG/DL (ref 65–117)
GLUCOSE SERPL-MCNC: 173 MG/DL (ref 65–100)
HCT VFR BLD AUTO: 27.7 % (ref 35–47)
HGB BLD-MCNC: 8 G/DL (ref 11.5–16)
IMM GRANULOCYTES # BLD AUTO: 0.1 K/UL (ref 0–0.04)
IMM GRANULOCYTES NFR BLD AUTO: 1 % (ref 0–0.5)
LYMPHOCYTES # BLD: 0.3 K/UL (ref 0.8–3.5)
LYMPHOCYTES NFR BLD: 4 % (ref 12–49)
MAGNESIUM SERPL-MCNC: 1.6 MG/DL (ref 1.6–2.4)
MCH RBC QN AUTO: 24.5 PG (ref 26–34)
MCHC RBC AUTO-ENTMCNC: 28.9 G/DL (ref 30–36.5)
MCV RBC AUTO: 85 FL (ref 80–99)
MONOCYTES # BLD: 0.2 K/UL (ref 0–1)
MONOCYTES NFR BLD: 2 % (ref 5–13)
NEUTS SEG # BLD: 7.2 K/UL (ref 1.8–8)
NEUTS SEG NFR BLD: 93 % (ref 32–75)
NRBC # BLD: 0 K/UL (ref 0–0.01)
NRBC BLD-RTO: 0 PER 100 WBC
PHOSPHATE SERPL-MCNC: 4.8 MG/DL (ref 2.6–4.7)
PLATELET # BLD AUTO: 103 K/UL (ref 150–400)
PMV BLD AUTO: 11.9 FL (ref 8.9–12.9)
POTASSIUM SERPL-SCNC: 4.6 MMOL/L (ref 3.5–5.1)
RBC # BLD AUTO: 3.26 M/UL (ref 3.8–5.2)
RBC MORPH BLD: ABNORMAL
RBC MORPH BLD: ABNORMAL
SERVICE CMNT-IMP: ABNORMAL
SODIUM SERPL-SCNC: 138 MMOL/L (ref 136–145)
WBC # BLD AUTO: 7.8 K/UL (ref 3.6–11)

## 2024-05-08 PROCEDURE — 6360000002 HC RX W HCPCS: Performed by: INTERNAL MEDICINE

## 2024-05-08 PROCEDURE — 2580000003 HC RX 258

## 2024-05-08 PROCEDURE — 6370000000 HC RX 637 (ALT 250 FOR IP): Performed by: INTERNAL MEDICINE

## 2024-05-08 PROCEDURE — 2580000003 HC RX 258: Performed by: STUDENT IN AN ORGANIZED HEALTH CARE EDUCATION/TRAINING PROGRAM

## 2024-05-08 PROCEDURE — 94640 AIRWAY INHALATION TREATMENT: CPT

## 2024-05-08 PROCEDURE — 6360000002 HC RX W HCPCS: Performed by: STUDENT IN AN ORGANIZED HEALTH CARE EDUCATION/TRAINING PROGRAM

## 2024-05-08 PROCEDURE — 82962 GLUCOSE BLOOD TEST: CPT

## 2024-05-08 PROCEDURE — 2580000003 HC RX 258: Performed by: INTERNAL MEDICINE

## 2024-05-08 PROCEDURE — 36415 COLL VENOUS BLD VENIPUNCTURE: CPT

## 2024-05-08 PROCEDURE — 6360000002 HC RX W HCPCS

## 2024-05-08 PROCEDURE — 6370000000 HC RX 637 (ALT 250 FOR IP): Performed by: STUDENT IN AN ORGANIZED HEALTH CARE EDUCATION/TRAINING PROGRAM

## 2024-05-08 PROCEDURE — 83735 ASSAY OF MAGNESIUM: CPT

## 2024-05-08 PROCEDURE — 2060000000 HC ICU INTERMEDIATE R&B

## 2024-05-08 PROCEDURE — 80048 BASIC METABOLIC PNL TOTAL CA: CPT

## 2024-05-08 PROCEDURE — 6370000000 HC RX 637 (ALT 250 FOR IP)

## 2024-05-08 PROCEDURE — APPNB45 APP NON BILLABLE 31-45 MINUTES: Performed by: ORTHOPAEDIC SURGERY

## 2024-05-08 PROCEDURE — 90935 HEMODIALYSIS ONE EVALUATION: CPT

## 2024-05-08 PROCEDURE — 85025 COMPLETE CBC W/AUTO DIFF WBC: CPT

## 2024-05-08 PROCEDURE — 99233 SBSQ HOSP IP/OBS HIGH 50: CPT | Performed by: INTERNAL MEDICINE

## 2024-05-08 PROCEDURE — 84100 ASSAY OF PHOSPHORUS: CPT

## 2024-05-08 PROCEDURE — 2700000000 HC OXYGEN THERAPY PER DAY

## 2024-05-08 RX ORDER — MAGNESIUM SULFATE IN WATER 40 MG/ML
2000 INJECTION, SOLUTION INTRAVENOUS ONCE
Status: COMPLETED | OUTPATIENT
Start: 2024-05-08 | End: 2024-05-08

## 2024-05-08 RX ADMIN — HEPARIN SODIUM 5000 UNITS: 5000 INJECTION INTRAVENOUS; SUBCUTANEOUS at 20:11

## 2024-05-08 RX ADMIN — SODIUM CHLORIDE, PRESERVATIVE FREE 10 ML: 5 INJECTION INTRAVENOUS at 20:12

## 2024-05-08 RX ADMIN — IPRATROPIUM BROMIDE AND ALBUTEROL SULFATE 1 DOSE: .5; 3 SOLUTION RESPIRATORY (INHALATION) at 09:08

## 2024-05-08 RX ADMIN — INSULIN HUMAN 8 UNITS: 100 INJECTION, SUSPENSION SUBCUTANEOUS at 01:14

## 2024-05-08 RX ADMIN — LINEZOLID 600 MG: 600 INJECTION, SOLUTION INTRAVENOUS at 08:53

## 2024-05-08 RX ADMIN — PANTOPRAZOLE SODIUM 40 MG: 40 TABLET, DELAYED RELEASE ORAL at 06:47

## 2024-05-08 RX ADMIN — WATER 40 MG: 1 INJECTION INTRAMUSCULAR; INTRAVENOUS; SUBCUTANEOUS at 01:14

## 2024-05-08 RX ADMIN — ARFORMOTEROL TARTRATE: 15 SOLUTION RESPIRATORY (INHALATION) at 09:13

## 2024-05-08 RX ADMIN — BENZONATATE 100 MG: 100 CAPSULE ORAL at 08:50

## 2024-05-08 RX ADMIN — ACETAMINOPHEN 650 MG: 325 TABLET ORAL at 08:50

## 2024-05-08 RX ADMIN — SODIUM CHLORIDE, PRESERVATIVE FREE 10 ML: 5 INJECTION INTRAVENOUS at 08:54

## 2024-05-08 RX ADMIN — MAGNESIUM SULFATE HEPTAHYDRATE 2000 MG: 40 INJECTION, SOLUTION INTRAVENOUS at 10:49

## 2024-05-08 RX ADMIN — CLONAZEPAM 0.5 MG: 0.5 TABLET ORAL at 20:11

## 2024-05-08 RX ADMIN — PREGABALIN 150 MG: 75 CAPSULE ORAL at 20:11

## 2024-05-08 RX ADMIN — Medication: at 08:50

## 2024-05-08 RX ADMIN — CARVEDILOL 25 MG: 12.5 TABLET, FILM COATED ORAL at 08:50

## 2024-05-08 RX ADMIN — Medication: at 20:11

## 2024-05-08 RX ADMIN — HEPARIN SODIUM 1300 UNITS: 1000 INJECTION INTRAVENOUS; SUBCUTANEOUS at 16:30

## 2024-05-08 RX ADMIN — MELATONIN 3 MG: at 20:12

## 2024-05-08 RX ADMIN — INSULIN HUMAN 8 UNITS: 100 INJECTION, SUSPENSION SUBCUTANEOUS at 19:06

## 2024-05-08 RX ADMIN — BENZONATATE 100 MG: 100 CAPSULE ORAL at 20:11

## 2024-05-08 RX ADMIN — HEPARIN SODIUM 5000 UNITS: 5000 INJECTION INTRAVENOUS; SUBCUTANEOUS at 06:47

## 2024-05-08 RX ADMIN — ISOSORBIDE MONONITRATE 60 MG: 30 TABLET, EXTENDED RELEASE ORAL at 08:50

## 2024-05-08 RX ADMIN — CARVEDILOL 25 MG: 12.5 TABLET, FILM COATED ORAL at 18:09

## 2024-05-08 RX ADMIN — Medication 2 UNITS: at 08:59

## 2024-05-08 RX ADMIN — LINEZOLID 600 MG: 600 INJECTION, SOLUTION INTRAVENOUS at 20:18

## 2024-05-08 RX ADMIN — ASPIRIN 81 MG: 81 TABLET, CHEWABLE ORAL at 08:50

## 2024-05-08 RX ADMIN — AMLODIPINE BESYLATE 5 MG: 5 TABLET ORAL at 08:50

## 2024-05-08 RX ADMIN — WATER 40 MG: 1 INJECTION INTRAMUSCULAR; INTRAVENOUS; SUBCUTANEOUS at 18:57

## 2024-05-08 ASSESSMENT — PAIN SCALES - GENERAL
PAINLEVEL_OUTOF10: 0

## 2024-05-08 NOTE — PROGRESS NOTES
NAME: Marilee Oakes        :  1950        MRN:  [id number]          Assessment :    Plan:  HILARY on CKD stage 3b  Anemia  COPD  CHF with AICD  Afib w/ watchman  Left ankle fracture with infection  Recurrent uti HD initiated , last on ; creatinine continues to rise, now 2.9; UOP 1450 ml; HD today; ATN; Upc ratio 2.2; No hydro    added amlodipine 5 mg daily on     Hgb < 10; continue retacrit    I think she needs some incremental HD, otherwise she will be at high risk for readmission for uremia/volume. will ask for a perm cath from IR and ask CM to work on outpatient HD.        Subjective:     Chief Complaint:  on bsc. Alert. Not a great historian, but denies complaint. We discussed the above. Her  is at bedside.     Review of Systems:    Symptom Y/N Comments  Symptom Y/N Comments   Fever/Chills    Chest Pain     Poor Appetite    Edema     Cough    Abdominal Pain     Sputum    Joint Pain     SOB/NOVOA    Pruritis/Rash     Nausea/vomit    Tolerating PT/OT     Diarrhea    Tolerating Diet     Constipation    Other       Could not obtain due to:      Objective:     VITALS:   Last 24hrs VS reviewed since prior progress note. Most recent are:  Vitals:    24 0250   BP: (!) 140/110   Pulse: 83   Resp: 22   Temp: 97.4 °F (36.3 °C)   SpO2: 100%       Intake/Output Summary (Last 24 hours) at 2024 0554  Last data filed at 2024 2251  Gross per 24 hour   Intake 2040 ml   Output 1450 ml   Net 590 ml        Telemetry Reviewed:     PHYSICAL EXAM:  General: NAD      Lab Data Reviewed: (see below)    Medications Reviewed: (see below)    PMH/SH reviewed - no change compared to H&P  ________________________________________________________________________  Care Plan discussed with:  Patient     Family      RN     Care Manager                    Consultant:          Comments   >50% of visit spent in counseling and coordination of care   heparin (porcine) injection 5,000 Units  5,000 Units SubCUTAneous 3 times per day    heparin 2 units/mL solution in 0.9% sodium chloride  200 mL Irrigation Once    linezolid (ZYVOX) IVPB 600 mg  600 mg IntraVENous Q12H    0.9 % sodium chloride infusion   IntraVENous PRN    pregabalin (LYRICA) capsule 150 mg  150 mg Oral Nightly    glucose chewable tablet 16 g  4 tablet Oral PRN    dextrose bolus 10% 125 mL  125 mL IntraVENous PRN    Or    dextrose bolus 10% 250 mL  250 mL IntraVENous PRN    glucagon injection 1 mg  1 mg SubCUTAneous PRN    dextrose 10 % infusion   IntraVENous Continuous PRN    insulin lispro (HUMALOG) injection vial 0-8 Units  0-8 Units SubCUTAneous TID WC    insulin lispro (HUMALOG) injection vial 0-4 Units  0-4 Units SubCUTAneous Nightly    albuterol (PROVENTIL) (2.5 MG/3ML) 0.083% nebulizer solution 2.5 mg  2.5 mg Nebulization Q2H PRN    ipratropium 0.5 mg-albuterol 2.5 mg (DUONEB) nebulizer solution 1 Dose  1 Dose Inhalation BID RT    [Held by provider] furosemide (LASIX) injection 20 mg  20 mg IntraVENous BID    sodium chloride flush 0.9 % injection 5-40 mL  5-40 mL IntraVENous 2 times per day    sodium chloride flush 0.9 % injection 5-40 mL  5-40 mL IntraVENous PRN    0.9 % sodium chloride infusion   IntraVENous PRN    ondansetron (ZOFRAN-ODT) disintegrating tablet 4 mg  4 mg Oral Q8H PRN    Or    ondansetron (ZOFRAN) injection 4 mg  4 mg IntraVENous Q6H PRN    polyethylene glycol (GLYCOLAX) packet 17 g  17 g Oral Daily PRN    acetaminophen (TYLENOL) tablet 650 mg  650 mg Oral Q6H PRN    Or    acetaminophen (TYLENOL) suppository 650 mg  650 mg Rectal Q6H PRN    melatonin tablet 3 mg  3 mg Oral Nightly    aluminum & magnesium hydroxide-simethicone (MAALOX) 200-200-20 MG/5ML suspension 30 mL  30 mL Oral Q6H PRN    aspirin chewable tablet 81 mg  81 mg Oral Daily    carvedilol (COREG) tablet 25 mg  25 mg Oral BID with meals    clonazePAM (KLONOPIN) tablet 0.5 mg  0.5 mg Oral Nightly    isosorbide

## 2024-05-08 NOTE — PROGRESS NOTES
End of Shift Note    Bedside shift change report given to Virginia ALARCON (oncoming nurse) by Rachel Greco RN (offgoing nurse).  Report included the following information SBAR, Kardex, Intake/Output, MAR, Recent Results, and Cardiac Rhythm V paced    Shift worked:  [id number]     Shift summary and any significant changes:    Pt did not sleep on this shift. Initially A0x4  Currently Aox2 only.     Concerns for physician to address:  Confused at night time, insomnia   Zone phone for oncoming shift:          Activity:     Number times ambulated in hallways past shift: 0  Number of times OOB to chair past shift: 0    Cardiac:   Cardiac Monitoring: Yes           Access:  Current line(s): PICC     Genitourinary:   Urinary status: voiding, incontinent, and external catheter    Respiratory:      Chronic home O2 use?: YES  Incentive spirometer at bedside: NO       GI:     Current diet:  ADULT DIET; Regular; 3 carb choices (45 gm/meal); Low Fat/Low Chol/High Fiber/KAREEM; Low Sodium (2 gm)  DIET ONE TIME MESSAGE;  Diet NPO  Passing flatus: YES  Tolerating current diet: YES       Pain Management:   Patient states pain is manageable on current regimen: YES    Skin:     Interventions: specialty bed, float heels, increase time out of bed, limit briefs, and internal/external urinary devices    Patient Safety:  Fall Score:    Interventions: bed/chair alarm, assistive device (walker, cane. etc), pt to call before getting OOB, and stay with me (per policy)       Length of Stay:  Expected LOS: 15  Actual LOS: 13      Rachel Greco, RN

## 2024-05-08 NOTE — PROGRESS NOTES
Occupational Therapy  Chart reviewed and attempted to see patient for OT treatment. The patient is currently off the floor for HD and Permacath placement. Will defer today, but continue to follow.     Tom Vargas, OTR/L

## 2024-05-08 NOTE — PROGRESS NOTES
05/08/24  0401   HCT 27.7*   HGB 8.0*     PT/OT:              ASSESSMENT / PLAN:   Principal Problem:    HILARY (acute kidney injury) ([facility])  Active Problems:    Coronary artery disease due to lipid rich plaque    Surgical site infection    Hardware complicating wound infection ([facility])    Antibiotic causing adverse effect    Yeast UTI    Chronic obstructive pulmonary disease (HCC)    Diabetes mellitus due to underlying condition, controlled, with complication (HCC)    Steroid-induced hyperglycemia    Dialysis patient ([facility])  Resolved Problems:    * No resolved hospital problems. *       -  Continue PT/OT NWB LLE in boot  -Patient will continue IV antibiotics for now until she clears her infection.  -Further surgical plans for left ankle will be established in the future once infection is cleared.  Patient will need to see foot and ankle specialist for further surgeries however. Will not be during this hospital stay   - Okay to remove boot for daily dressing changes. Otherwise, must remain in boot. Will possibly get a new boot if they do not have a smaller one that they were previously given   - Stitches to stay until 3 weeks post op       Signed By: NIKKI Stallings

## 2024-05-08 NOTE — PROGRESS NOTES
Granulocytes % 1 (H) 0.0 - 0.5 %    Neutrophils Absolute 7.2 1.8 - 8.0 K/UL    Lymphocytes Absolute 0.3 (L) 0.8 - 3.5 K/UL    Monocytes Absolute 0.2 0.0 - 1.0 K/UL    Eosinophils Absolute 0.0 0.0 - 0.4 K/UL    Basophils Absolute 0.0 0.0 - 0.1 K/UL    Immature Granulocytes Absolute 0.1 (H) 0.00 - 0.04 K/UL    Differential Type SMEAR SCANNED      RBC Comment ANISOCYTOSIS  1+        RBC Comment MICROCYTOSIS  1+       Magnesium    Collection Time: 05/08/24  4:01 AM   Result Value Ref Range    Magnesium 1.6 1.6 - 2.4 mg/dL   Phosphorus    Collection Time: 05/08/24  4:01 AM   Result Value Ref Range    Phosphorus 4.8 (H) 2.6 - 4.7 MG/DL   POCT Glucose    Collection Time: 05/08/24  7:25 AM   Result Value Ref Range    POC Glucose 203 (H) 65 - 117 mg/dL    Performed by: Amargo Jimy PCT    POCT Glucose    Collection Time: 05/08/24 11:10 AM   Result Value Ref Range    POC Glucose 297 (H) 65 - 117 mg/dL    Performed by: Amargo [place] PCT        Results       Procedure Component Value Units Date/Time    Culture, Anaerobic [[id number]] Collected: 04/30/24 1720    Order Status: Completed Specimen: Left Updated: 05/04/24 0743     Special Requests NO SPECIAL REQUESTS        Culture NO GROWTH 4 DAYS       Culture, Wound [[id number]] Collected: 04/30/24 1720    Order Status: Completed Specimen: Ankle Updated: 05/04/24 0742     Special Requests NO SPECIAL REQUESTS        Gram Stain NO WBC'S SEEN         NO ORGANISMS SEEN        Culture NO GROWTH 4 DAYS       Culture, Urine [[id number]] Collected: 04/25/24 1745    Order Status: Canceled Specimen: Urine, clean catch     Blood Culture 1 [[id number]] Collected: 04/25/24 1156    Order Status: Completed Specimen: Blood Updated: 05/01/24 0808     Special Requests --        LEFT  Antecubital       Culture NO GROWTH 6 DAYS       Blood Culture 2 [[id number]] Collected: 04/25/24 1156    Order Status: Completed Specimen: Blood Updated: 05/01/24 0808     Special Requests --        LEFT  ARM    fracture with syndesmotic screw. 2 screw fixate the medial malleolar fracture. Fibular fracture demonstrates one half shafts width posterior displacement of the distal fracture fragment. There is less than one half shafts width lateral displacement of the medial malleolar fracture. Bones are osteopenic. Degenerative changes in the midfoot.     1. Postoperative changes of the ankle detailed above.    Vascular duplex lower extremity venous left    Result Date: 4/3/2024    No evidence of deep vein thrombosis in the left lower extremity. No evidence of deep vein or superficial vein thrombosis in the left lower extremity. Vessels demonstrate normal compressibility, color filling, and phasic and spontaneous flow. Indication:DVT hx No evidence of deep vein thrombosis in the left lower extremity. No evidence of deep vein or superficial vein thrombosis in the left lower extremity. Vessels demonstrate normal compressibility, color filling, and phasic and spontaneous flow.       Mariela Marino MD FACP

## 2024-05-08 NOTE — PROGRESS NOTES
Hospitalist Progress Note    NAME:   Marilee Oakes   : 1950   MRN: [id number]     Date/Time: 2024 8:17 AM  Patient PCP: No primary care provider on file.    Estimated discharge date:   Barriers: recovery after Orthopedic procedure, renal improvement, final antibiotic plan, Patient and family adamantly do not want patient to go back to [facility], started on HD on , nephrology clearance        Assessment / Plan:     MRSA wound infection  Recurrent UTI  Left ankle fracture s/p ORIF  Blood cultures taken from [facility] were positive for MRSA per MD at [facility], prompting prior admission. No blood cultures here are positive. Reportedly had rash with Daptomycin and now renal failure with Vancomycin.  Left leg bandaged.  Patient is afebrile, white count is normal, procalcitonin is negative.  - PICC line is present  - Ortho : recommended Continue PT/OT NWB LLE in boot  -Patient will continue IV antibiotics for now until she clears her infection.  -Further surgical plans for left ankle will be established in the future once infection is cleared.  Patient will need to see foot and ankle specialist for further surgeries however. Will not be during this hospital stay   - Okay to remove boot for daily dressing changes. Otherwise, must remain in boot. Will possibly get a new boot if they do not have a smaller one that they were previously given      S/p ceftaroline, switch to IV Zyvox  As per  ID, plan is to discharge patient on p.o. doxycycline       CHF with ICD - mild exacerbation, improving  CAD  A-fib with Watchman device  Continue with aspirin, Imdur, Coreg.  - BNP elevated, coarse lung sounds  - cpap overnight  - holding lasix with renal function declining     Episode of chest pain   Pt c/o burning chest pain likely from GERD  -Continue with PPI    Acute on chronic renal failure   CKD 3 with baseline creatinine between 1.2-1.6.  Now requiring HD   - Nephrology following   - erm cath from IR and  °F (36.3 °C) Oral 83 22 100 %   05/07/24 2251 138/81 98.1 °F (36.7 °C) Oral 93 21 99 %   05/07/24 1958 (!) 160/94 98 °F (36.7 °C) Oral 80 22 98 %   05/07/24 1541 (!) 142/84 98.2 °F (36.8 °C) Oral 80 18 --   05/07/24 1131 (!) 138/90 98 °F (36.7 °C) Oral 80 18 97 %         Intake/Output Summary (Last 24 hours) at 5/8/2024 0817  Last data filed at 5/8/2024 0646  Gross per 24 hour   Intake 2040 ml   Output 1750 ml   Net 290 ml        I had a face to face encounter and independently examined this patient on 5/8/2024, as outlined below:  PHYSICAL EXAM:  General: Alert, cooperative  EENT:  EOMI. Anicteric sclerae.  Resp:  Bibasilar crackles   CV:  Regular  rhythm,  No edema  GI:  Soft, Non distended, Non tender.  +Bowel sounds  Neurologic:  Alert and oriented X 3, normal speech,   Psych:   Good insight. Not anxious nor agitated  Skin:  No rashes.  No jaundice,bandage on left leg    Reviewed most current lab test results and cultures  YES  Reviewed most current radiology test results   YES  Review and summation of old records today    NO  Reviewed patient's current orders and MAR    YES  PMH/SH reviewed - no change compared to H&P    Procedures: see electronic medical records for all procedures/Xrays and details which were not copied into this note but were reviewed prior to creation of Plan.      LABS:  I reviewed today's most current labs and imaging studies.  Pertinent labs include:  Recent Labs     05/06/24 0333 05/07/24 0443 05/08/24  0401   WBC 5.6 4.8 7.8   HGB 8.0* 9.6* 8.0*   HCT 27.5* 33.6* 27.7*   * 88* 103*     Recent Labs     05/06/24 0333 05/07/24 0443 05/08/24  0401    140 138   K 4.1 4.1 4.6    107 106   CO2 30 29 29   GLUCOSE 223* 119* 173*   BUN 42* 55* 61*   CREATININE 2.57* 2.77* 2.91*   CALCIUM 8.3* 8.7 8.5   MG 1.7 1.7 1.6   PHOS 5.2* 5.3* 4.8*       Signed: Cecy Hackett MD

## 2024-05-08 NOTE — FLOWSHEET NOTE
05/08/24 1627   Vital Signs   BP (!) 148/79   Temp 97.5 °F (36.4 °C)   Pulse 81   Respirations 18   Pain Assessment   Pain Assessment None - Denies Pain   Post-Hemodialysis Assessment   Post-Treatment Procedures Blood returned;Catheter capped, clamped and heparinized x 2 ports   Machine Disinfection Process Acid/Vinegar Clean;Heat Disinfect;Exterior Machine Disinfection   Rinseback Volume (ml) 300 ml   Blood Volume Processed (Liters) 58.9 L   Dialyzer Clearance Lightly streaked   Duration of Treatment (minutes) 180 minutes   Hemodialysis Intake (ml) 500 ml   Hemodialysis Output (ml) 3000 ml   NET Removed (ml) 2500   Tolerated Treatment Good   Bilateral Breath Sounds Diminished   Edema Right lower extremity;Left lower extremity   RLE Edema Trace;+1   LLE Edema Trace;+1   Time Off 1627   Patient Disposition Return to room   Observations & Evaluations   Level of Consciousness 0   Oriented X 3   Heart Rhythm Regular   Respiratory Quality/Effort Unlabored   O2 Device Nasal cannula   Skin Color Ecchymosis (comment)   Skin Condition/Temp Dry;Warm   Appetite Good   Abdomen Inspection Obese;Soft   Bowel Sounds (All Quadrants) Active     Primary RN SBAR: Virginia Hurd RN  Comments: Pt tolerated treatment well without any complaints or complications.

## 2024-05-08 NOTE — CARE COORDINATION
Transition of Care Plan:     RUR: 28%      Prior Level of Functioning: assistance with ADLs/IADLs: Bathing, Housework, Shopping and Mobility.     Disposition:  home with [facility] & new HD set up     If SNF or IPR: Date FOC offered: Date FOC received:   Accepting facility:   Date authorization started with reference number: no auth needed  Date authorization received and expires:      Follow up appointments:PCP: Kevan: 6/21/24  ID: Ned: 5/14 at 2 PM   DME needed: patient has multiple DME at home  Transportation at discharge: BLS transport will need to be arranged.   IM/IMM Medicare/ letter given: 2nd IM letter will need to be delivered.   Is patient a [place] and connected with [facility]? N/a              If yes, was  transfer form completed and [facility] notified?   Caregiver Contact: Spouse: Wesley Oakes; [phone]  Discharge Caregiver contacted prior to discharge? yes  Care Conference needed? N/a  Barriers to discharge: HD set up              CM noted previous CM set pt up with HH through [facility] [place].  Pt currently on IV ABX; however, the plan is to switch to po at d/c per notes.  CM received consult to set up home HD.  CM will send referral to CHERIE Arevalo LMSW  Supervisee in Social Work  Care Management, [facility]  [phone]

## 2024-05-08 NOTE — FLOWSHEET NOTE
Primary RN SBAR: Virginia Hurd, AGNES  Patient Education provided: HD treatment  Preferred Education method and Primary language: Verbal, English  Hospital associated wait time; reason: 25 min wait for transport    Hepatitis B Surface Ag   Date/Time Value Ref Range Status   05/02/2024 05:36 PM <0.10 Index Final     Hep B S Ab   Date/Time Value Ref Range Status   05/02/2024 05:36 PM <3.10 mIU/mL Final        05/08/24 1326   Vital Signs   /74   Temp 97.7 °F (36.5 °C)   Pulse 80   Respirations 18   Pain Assessment   Pain Assessment None - Denies Pain   Treatment   Time On 1326   Treatment Goal 2000 ml   Observations & Evaluations   Level of Consciousness 0   Oriented X 3   Heart Rhythm Regular   Respiratory Quality/Effort Dyspnea with exertion   O2 Device Nasal cannula   Bilateral Breath Sounds Diminished   Skin Color Ecchymosis (comment)   Skin Condition/Temp Dry;Warm   Appetite Good   Abdomen Inspection Obese;Soft   Bowel Sounds (All Quadrants) Active   Edema Right lower extremity;Left lower extremity   RLE Edema Trace;+1   LLE Edema Trace;+1   Technical Checks   Dialysis Machine No. 09   RO Machine Number [id number]   Dialyzer Lot No. [id number]   Tubing Lot Number [id number]   All Connections Secure Yes   NS Bag Yes   Saline Line Double Clamped Yes   Dialyzer Revaclear 300   Prime Volume (mL) 200 mL   ICEBOAT I;C;E;B;O;A;T   RO Machine Log Sheet Completed Yes   Machine Alarm Self Test Completed;Passed   Air Foam Detector Tested;Proper Function   Extracorporeal Circuit Tested for Integrity Yes   Machine Conductivity 13.8   Manual Ph 7.2   Bleach Test (Neg) Yes   Bath Temperature 98.6 °F (37 °C)   Dialysis Bath   K+ (Potassium) 3   Ca+ (Calcium) 2.5   Na+ (Sodium) 140   HCO3 (Bicarb) 35   Treatment Initiation   Dialyze Hours 3   Treatment  Initiation Universal Precautions maintained;Lines secured to patient;Connections secured;Prime given;Venous Parameters set;Arterial Parameters set;Air foam detector engaged;Saline line  77.1

## 2024-05-08 NOTE — PROGRESS NOTES
Physical therapy services attempted @1:40PM. Pt currently off unit and unavailable 2/2 receiving dialysis. PT Team will try to provide skilled therapy services at a later date as time permits and as medically appropriate to patient tolerance.    Madison Alejandro, PT, DPT

## 2024-05-08 NOTE — PLAN OF CARE
Problem: Discharge Planning  Goal: Discharge to home or other facility with appropriate resources  5/7/2024 2204 by Rachel Greco RN  Outcome: Progressing  5/7/2024 1032 by Virginia Hurd RN  Outcome: Progressing  Flowsheets (Taken 5/7/2024 0830)  Discharge to home or other facility with appropriate resources: Identify barriers to discharge with patient and caregiver     Problem: Skin/Tissue Integrity  Goal: Absence of new skin breakdown  Description: 1.  Monitor for areas of redness and/or skin breakdown  2.  Assess vascular access sites hourly  3.  Every 4-6 hours minimum:  Change oxygen saturation probe site  4.  Every 4-6 hours:  If on nasal continuous positive airway pressure, respiratory therapy assess nares and determine need for appliance change or resting period.  5/7/2024 2204 by Rachel Greco RN  Outcome: Progressing  5/7/2024 1032 by Virginia Hurd RN  Outcome: Progressing     Problem: Safety - Adult  Goal: Free from fall injury  5/7/2024 2204 by Rachel Greco RN  Outcome: Progressing  5/7/2024 1032 by Virginia Hurd RN  Outcome: Progressing     Problem: ABCDS Injury Assessment  Goal: Absence of physical injury  5/7/2024 2204 by Rachel Greco RN  Outcome: Progressing  5/7/2024 1032 by Virginia Hurd RN  Outcome: Progressing     Problem: Chronic Conditions and Co-morbidities  Goal: Patient's chronic conditions and co-morbidity symptoms are monitored and maintained or improved  5/7/2024 2204 by Rachel Greco RN  Outcome: Progressing  5/7/2024 1032 by Virginia Hurd RN  Outcome: Progressing  Flowsheets (Taken 5/7/2024 0830)  Care Plan - Patient's Chronic Conditions and Co-Morbidity Symptoms are Monitored and Maintained or Improved: Monitor and assess patient's chronic conditions and comorbid symptoms for stability, deterioration, or improvement     Problem: Physical Therapy - Adult  Goal: By Discharge: Performs mobility at highest level of function for planned discharge setting.  See evaluation

## 2024-05-09 ENCOUNTER — APPOINTMENT (OUTPATIENT)
Facility: HOSPITAL | Age: 74
End: 2024-05-09
Attending: INTERNAL MEDICINE
Payer: MEDICARE

## 2024-05-09 ENCOUNTER — TELEPHONE (OUTPATIENT)
Age: 74
End: 2024-05-09

## 2024-05-09 LAB
ANION GAP SERPL CALC-SCNC: 3 MMOL/L (ref 5–15)
BASOPHILS # BLD: 0 K/UL (ref 0–0.1)
BASOPHILS NFR BLD: 0 % (ref 0–1)
BUN SERPL-MCNC: 32 MG/DL (ref 6–20)
BUN/CREAT SERPL: 17 (ref 12–20)
CALCIUM SERPL-MCNC: 7.8 MG/DL (ref 8.5–10.1)
CHLORIDE SERPL-SCNC: 104 MMOL/L (ref 97–108)
CO2 SERPL-SCNC: 29 MMOL/L (ref 21–32)
CREAT SERPL-MCNC: 1.89 MG/DL (ref 0.55–1.02)
DIFFERENTIAL METHOD BLD: ABNORMAL
EOSINOPHIL # BLD: 0 K/UL (ref 0–0.4)
EOSINOPHIL NFR BLD: 0 % (ref 0–7)
ERYTHROCYTE [DISTWIDTH] IN BLOOD BY AUTOMATED COUNT: 17.9 % (ref 11.5–14.5)
GLUCOSE BLD STRIP.AUTO-MCNC: 116 MG/DL (ref 65–117)
GLUCOSE BLD STRIP.AUTO-MCNC: 150 MG/DL (ref 65–117)
GLUCOSE BLD STRIP.AUTO-MCNC: 215 MG/DL (ref 65–117)
GLUCOSE SERPL-MCNC: 261 MG/DL (ref 65–100)
HCT VFR BLD AUTO: 26.5 % (ref 35–47)
HGB BLD-MCNC: 7.7 G/DL (ref 11.5–16)
IMM GRANULOCYTES # BLD AUTO: 0.1 K/UL (ref 0–0.04)
IMM GRANULOCYTES NFR BLD AUTO: 1 % (ref 0–0.5)
LYMPHOCYTES # BLD: 0.2 K/UL (ref 0.8–3.5)
LYMPHOCYTES NFR BLD: 4 % (ref 12–49)
MAGNESIUM SERPL-MCNC: 1.9 MG/DL (ref 1.6–2.4)
MCH RBC QN AUTO: 24.7 PG (ref 26–34)
MCHC RBC AUTO-ENTMCNC: 29.1 G/DL (ref 30–36.5)
MCV RBC AUTO: 84.9 FL (ref 80–99)
MONOCYTES # BLD: 0.1 K/UL (ref 0–1)
MONOCYTES NFR BLD: 2 % (ref 5–13)
NEUTS SEG # BLD: 4.7 K/UL (ref 1.8–8)
NEUTS SEG NFR BLD: 93 % (ref 32–75)
NRBC # BLD: 0 K/UL (ref 0–0.01)
NRBC BLD-RTO: 0 PER 100 WBC
PHOSPHATE SERPL-MCNC: 3.2 MG/DL (ref 2.6–4.7)
PLATELET # BLD AUTO: 81 K/UL (ref 150–400)
PMV BLD AUTO: 12.2 FL (ref 8.9–12.9)
POTASSIUM SERPL-SCNC: 4.1 MMOL/L (ref 3.5–5.1)
RBC # BLD AUTO: 3.12 M/UL (ref 3.8–5.2)
RBC MORPH BLD: ABNORMAL
SERVICE CMNT-IMP: ABNORMAL
SERVICE CMNT-IMP: ABNORMAL
SERVICE CMNT-IMP: NORMAL
SODIUM SERPL-SCNC: 136 MMOL/L (ref 136–145)
WBC # BLD AUTO: 5.1 K/UL (ref 3.6–11)

## 2024-05-09 PROCEDURE — 6370000000 HC RX 637 (ALT 250 FOR IP): Performed by: INTERNAL MEDICINE

## 2024-05-09 PROCEDURE — 6360000002 HC RX W HCPCS: Performed by: STUDENT IN AN ORGANIZED HEALTH CARE EDUCATION/TRAINING PROGRAM

## 2024-05-09 PROCEDURE — 2060000000 HC ICU INTERMEDIATE R&B

## 2024-05-09 PROCEDURE — 80048 BASIC METABOLIC PNL TOTAL CA: CPT

## 2024-05-09 PROCEDURE — 85025 COMPLETE CBC W/AUTO DIFF WBC: CPT

## 2024-05-09 PROCEDURE — 2580000003 HC RX 258: Performed by: STUDENT IN AN ORGANIZED HEALTH CARE EDUCATION/TRAINING PROGRAM

## 2024-05-09 PROCEDURE — 6360000002 HC RX W HCPCS: Performed by: INTERNAL MEDICINE

## 2024-05-09 PROCEDURE — 2500000003 HC RX 250 WO HCPCS: Performed by: STUDENT IN AN ORGANIZED HEALTH CARE EDUCATION/TRAINING PROGRAM

## 2024-05-09 PROCEDURE — 97530 THERAPEUTIC ACTIVITIES: CPT

## 2024-05-09 PROCEDURE — 36415 COLL VENOUS BLD VENIPUNCTURE: CPT

## 2024-05-09 PROCEDURE — 97530 THERAPEUTIC ACTIVITIES: CPT | Performed by: OCCUPATIONAL THERAPIST

## 2024-05-09 PROCEDURE — 0JH63XZ INSERTION OF TUNNELED VASCULAR ACCESS DEVICE INTO CHEST SUBCUTANEOUS TISSUE AND FASCIA, PERCUTANEOUS APPROACH: ICD-10-PCS | Performed by: STUDENT IN AN ORGANIZED HEALTH CARE EDUCATION/TRAINING PROGRAM

## 2024-05-09 PROCEDURE — 82962 GLUCOSE BLOOD TEST: CPT

## 2024-05-09 PROCEDURE — 6370000000 HC RX 637 (ALT 250 FOR IP): Performed by: STUDENT IN AN ORGANIZED HEALTH CARE EDUCATION/TRAINING PROGRAM

## 2024-05-09 PROCEDURE — 97110 THERAPEUTIC EXERCISES: CPT

## 2024-05-09 PROCEDURE — 2580000003 HC RX 258: Performed by: INTERNAL MEDICINE

## 2024-05-09 PROCEDURE — 02H633Z INSERTION OF INFUSION DEVICE INTO RIGHT ATRIUM, PERCUTANEOUS APPROACH: ICD-10-PCS | Performed by: STUDENT IN AN ORGANIZED HEALTH CARE EDUCATION/TRAINING PROGRAM

## 2024-05-09 PROCEDURE — 94640 AIRWAY INHALATION TREATMENT: CPT

## 2024-05-09 PROCEDURE — 2709999900 IR TUNNELED CVC PLACE WO SQ PORT/PUMP > 5 YEARS

## 2024-05-09 PROCEDURE — 83735 ASSAY OF MAGNESIUM: CPT

## 2024-05-09 PROCEDURE — 97535 SELF CARE MNGMENT TRAINING: CPT | Performed by: OCCUPATIONAL THERAPIST

## 2024-05-09 PROCEDURE — 84100 ASSAY OF PHOSPHORUS: CPT

## 2024-05-09 RX ORDER — HEPARIN SODIUM 5000 [USP'U]/ML
10000 INJECTION, SOLUTION INTRAVENOUS; SUBCUTANEOUS ONCE
Status: DISCONTINUED | OUTPATIENT
Start: 2024-05-09 | End: 2024-05-10 | Stop reason: HOSPADM

## 2024-05-09 RX ORDER — FENTANYL CITRATE 50 UG/ML
INJECTION, SOLUTION INTRAMUSCULAR; INTRAVENOUS PRN
Status: COMPLETED | OUTPATIENT
Start: 2024-05-09 | End: 2024-05-09

## 2024-05-09 RX ORDER — PREDNISONE 20 MG/1
40 TABLET ORAL DAILY
Status: DISCONTINUED | OUTPATIENT
Start: 2024-05-10 | End: 2024-05-10 | Stop reason: HOSPADM

## 2024-05-09 RX ORDER — PREDNISONE 20 MG/1
40 TABLET ORAL DAILY
Status: DISCONTINUED | OUTPATIENT
Start: 2024-05-09 | End: 2024-05-09

## 2024-05-09 RX ORDER — LIDOCAINE HYDROCHLORIDE 20 MG/ML
20 INJECTION, SOLUTION INFILTRATION; PERINEURAL ONCE
Status: COMPLETED | OUTPATIENT
Start: 2024-05-09 | End: 2024-05-09

## 2024-05-09 RX ORDER — HEPARIN SODIUM 200 [USP'U]/100ML
200 INJECTION, SOLUTION INTRAVENOUS ONCE
Status: COMPLETED | OUTPATIENT
Start: 2024-05-09 | End: 2024-05-09

## 2024-05-09 RX ADMIN — LINEZOLID 600 MG: 600 INJECTION, SOLUTION INTRAVENOUS at 21:04

## 2024-05-09 RX ADMIN — FENTANYL CITRATE 25 MCG: 50 INJECTION, SOLUTION INTRAMUSCULAR; INTRAVENOUS at 09:15

## 2024-05-09 RX ADMIN — FENTANYL CITRATE 25 MCG: 50 INJECTION, SOLUTION INTRAMUSCULAR; INTRAVENOUS at 09:10

## 2024-05-09 RX ADMIN — WATER 40 MG: 1 INJECTION INTRAMUSCULAR; INTRAVENOUS; SUBCUTANEOUS at 10:25

## 2024-05-09 RX ADMIN — CARVEDILOL 25 MG: 12.5 TABLET, FILM COATED ORAL at 10:35

## 2024-05-09 RX ADMIN — ASPIRIN 81 MG: 81 TABLET, CHEWABLE ORAL at 10:26

## 2024-05-09 RX ADMIN — SODIUM CHLORIDE, PRESERVATIVE FREE 10 ML: 5 INJECTION INTRAVENOUS at 20:21

## 2024-05-09 RX ADMIN — CARVEDILOL 25 MG: 12.5 TABLET, FILM COATED ORAL at 17:03

## 2024-05-09 RX ADMIN — HEPARIN SODIUM 3600 UNITS: 1000 INJECTION INTRAVENOUS; SUBCUTANEOUS at 09:16

## 2024-05-09 RX ADMIN — SODIUM CHLORIDE, PRESERVATIVE FREE 10 ML: 5 INJECTION INTRAVENOUS at 10:28

## 2024-05-09 RX ADMIN — Medication 2 UNITS: at 17:03

## 2024-05-09 RX ADMIN — CLONAZEPAM 0.5 MG: 0.5 TABLET ORAL at 20:20

## 2024-05-09 RX ADMIN — ISOSORBIDE MONONITRATE 60 MG: 30 TABLET, EXTENDED RELEASE ORAL at 10:26

## 2024-05-09 RX ADMIN — LINEZOLID 600 MG: 600 INJECTION, SOLUTION INTRAVENOUS at 10:28

## 2024-05-09 RX ADMIN — AMLODIPINE BESYLATE 5 MG: 5 TABLET ORAL at 10:26

## 2024-05-09 RX ADMIN — BENZONATATE 100 MG: 100 CAPSULE ORAL at 20:20

## 2024-05-09 RX ADMIN — IPRATROPIUM BROMIDE AND ALBUTEROL SULFATE 1 DOSE: .5; 3 SOLUTION RESPIRATORY (INHALATION) at 07:23

## 2024-05-09 RX ADMIN — PREGABALIN 150 MG: 75 CAPSULE ORAL at 20:20

## 2024-05-09 RX ADMIN — ARFORMOTEROL TARTRATE: 15 SOLUTION RESPIRATORY (INHALATION) at 20:34

## 2024-05-09 RX ADMIN — ARFORMOTEROL TARTRATE: 15 SOLUTION RESPIRATORY (INHALATION) at 07:29

## 2024-05-09 RX ADMIN — BENZONATATE 100 MG: 100 CAPSULE ORAL at 10:26

## 2024-05-09 RX ADMIN — LIDOCAINE HYDROCHLORIDE 10 ML: 20 INJECTION, SOLUTION INFILTRATION; PERINEURAL at 09:15

## 2024-05-09 RX ADMIN — Medication: at 20:23

## 2024-05-09 RX ADMIN — HEPARIN SODIUM IN SODIUM CHLORIDE 1000 UNITS: 200 INJECTION INTRAVENOUS at 09:17

## 2024-05-09 RX ADMIN — IPRATROPIUM BROMIDE AND ALBUTEROL SULFATE 1 DOSE: .5; 3 SOLUTION RESPIRATORY (INHALATION) at 20:34

## 2024-05-09 RX ADMIN — WATER 2000 MG: 1 INJECTION INTRAMUSCULAR; INTRAVENOUS; SUBCUTANEOUS at 09:10

## 2024-05-09 RX ADMIN — PANTOPRAZOLE SODIUM 40 MG: 40 TABLET, DELAYED RELEASE ORAL at 05:00

## 2024-05-09 RX ADMIN — FENTANYL CITRATE 25 MCG: 50 INJECTION, SOLUTION INTRAMUSCULAR; INTRAVENOUS at 09:12

## 2024-05-09 RX ADMIN — BENZONATATE 100 MG: 100 CAPSULE ORAL at 17:03

## 2024-05-09 RX ADMIN — ONDANSETRON 4 MG: 4 TABLET, ORALLY DISINTEGRATING ORAL at 20:20

## 2024-05-09 RX ADMIN — Medication: at 10:26

## 2024-05-09 RX ADMIN — MELATONIN 3 MG: at 20:20

## 2024-05-09 ASSESSMENT — PAIN SCALES - GENERAL
PAINLEVEL_OUTOF10: 0
PAINLEVEL_OUTOF10: 0

## 2024-05-09 NOTE — PLAN OF CARE
DRAINAGE WITH HARDWARE REMOVAL (Left) 9 Days Post-Op   Precautions:  (falls, LLE NWB, HILARY)   Left Lower Extremity Weight Bearing: Non Weight Bearing            Chart, occupational therapy assessment, plan of care, and goals were reviewed.    ASSESSMENT  Patient presented supine in bed agreeable to work with therapy. She is making steady functional progress and is putting forth her best effort. Overall she was SBA for bed mobility, Min A for sliding board transfer to , she was up to mod A for LB dressing, was dependent for toileting hygiene (bowel incontinent) and was supervision for seated grooming. Although she is attempting to be compliant with her LLE NWB precautions she does get confused with which LE she is suppose to be NWB on and required additional cueing this session for consistent adherence during LB dressing, scooting and transfers. Excellent progress noted with LB dressing, with cueing needed for technique and problem solving. At this time she will continue to benefit from acute OT and will need SNF rehab after discharge.              PLAN :  Patient continues to benefit from skilled intervention to address the above impairments.  Continue treatment per established plan of care to address goals.      Recommendation for discharge: (in order for the patient to meet his/her long term goals)SNF rehab    Other factors to consider for discharge: patient's current support system is unable to meet their requirements for physical assistance and impaired cognition    IF patient discharges home will need the following DME: bedside commode with drop arm and sliding/transfer board         OBJECTIVE DATA SUMMARY:   Cognitive/Behavioral Status:  Orientation  Orientation Level: Oriented X4  Cognition  Following Commands: Follows multistep commands with increased time;Follows multistep commands with repitition  Attention Span: Difficulty dividing attention  Safety Judgement: Decreased awareness of need for  safety  Problem Solving: Assistance required to generate solutions;Assistance required to implement solutions;Assistance required to correct errors made  Insights: Fully aware of deficits  Initiation: Does not require cues  Sequencing: Requires cues for some    Functional Mobility and Transfers for ADLs:  Bed Mobility:  Bed Mobility Training  Rolling: Stand-by assistance  Supine to Sit: Stand-by assistance  Scooting: Contact-guard assistance;Minimum assistance     Transfers:   Transfer Training  Bed to Chair: Minimum assistance (scooting laterally on sliding board to drop arm recliner)  Sliding Board: Minimum assistance      Balance:     Balance  Sitting: Intact      ADL Intervention:    Grooming: Supervision;Setup   Grooming Skilled Clinical Factors: seated in chair to wash face and comb hair    LE Dressing: Stand by assistance;Verbal cueing;Moderate assistance;Supervision;Setup  LE Dressing Skilled Clinical Factors: supervision for donning R shoe, mod A overall to don/doff underpants to and from waist, seated EOB and performing wieght shifts. Patient using cossed leg technique and requiring cueing to maintain LLE NWB when crossing RLE.    Toileting: Dependent/Total  Toileting Skilled Clinical Factors: for clothing management and bowel hygiene.      Pain Rating:  No complaint of pain      Activity Tolerance:   Fair  and requires rest breaks  Please refer to the flowsheet for vital signs taken during this treatment.    After treatment:   Patient left in no apparent distress sitting up in chair, Call bell within reach, and Bed/ chair alarm activated    COMMUNICATION/EDUCATION:   The patient's plan of care was discussed with: physical therapist and registered nurse         Thank you for this referral.  Tom Vargas, OTR/L  Minutes: 28

## 2024-05-09 NOTE — PROGRESS NOTES
0745: pt arrived to [facility] recovery via stretcher. Pt answered all orientation questions correctly. Vss. Consent obtained from  by two colleague nurses.     0900: in angio being prepped.     0920: jenifer catheter removed and permacath placed in right subclavian. Pt received 100 mcg of fentanyl. Pt is in no distress at this time and has no complaints.     0935: report given to primary nurse.     0945: transport picked pt up.

## 2024-05-09 NOTE — PLAN OF CARE
2300: Bedside and Verbal shift change report given to AGNES Xie (oncoming nurse) by AGNES Parish (offgoing nurse). Report included the following information Nurse Handoff Report and Index. During report, patient had gown off and became agitated when nurse attempted to place a new gown on. Nurse will attempt to place gown on later. Told by previous that PICC line is to be removed but patient is still receiving antibiotics.   0500: Heparin held for procedure.   0722: Patient moved to stretcher and headed to IR with transport, tele box and eye glasses. Spouse was at the bedside and had no questions for this nurse.      End of Shift Note    Bedside shift change report given to AGNES Tavares (oncoming nurse) by ANJU TORRES RN (offgoing nurse).  Report included the following information SBAR and Kardex    Shift worked:  [id number]     Shift summary and any significant changes:     Nurse left PICC line in place because patient is still receiving IV antibiotics and needs orders from ID to remove line.   Heparin was held for perm cath.  Patient was up most of the night.   Concerns for physician to address:       Zone phone for oncoming shift:          Activity:     Number times ambulated in hallways past shift: 0  Number of times OOB to chair past shift: 0    Cardiac:   Cardiac Monitoring: Yes           Access:  Current line(s): PICC     Genitourinary:   Urinary status: voiding, incontinent, and external catheter    Respiratory:      Chronic home O2 use?: NO  Incentive spirometer at bedside: N/A       GI:     Current diet:  Diet NPO  Passing flatus: YES  Tolerating current diet: YES       Pain Management:   Patient states pain is manageable on current regimen: YES    Skin:     Interventions: internal/external urinary devices    Patient Safety:  Fall Score:    Interventions: bed/chair alarm       Length of Stay:  Expected LOS: 14  Actual LOS: 14      ANJU TORRES RN                              Problem: Discharge Planning  Goal:  improved  5/8/2024 2216 by Марина Rodriguez RN  Outcome: Progressing  Flowsheets (Taken 5/8/2024 1930)  Care Plan - Patient's Chronic Conditions and Co-Morbidity Symptoms are Monitored and Maintained or Improved:   Monitor and assess patient's chronic conditions and comorbid symptoms for stability, deterioration, or improvement   Collaborate with multidisciplinary team to address chronic and comorbid conditions and prevent exacerbation or deterioration   Update acute care plan with appropriate goals if chronic or comorbid symptoms are exacerbated and prevent overall improvement and discharge  5/8/2024 1616 by Virginia Hurd RN  Outcome: Progressing  Flowsheets (Taken 5/8/2024 0735)  Care Plan - Patient's Chronic Conditions and Co-Morbidity Symptoms are Monitored and Maintained or Improved: Monitor and assess patient's chronic conditions and comorbid symptoms for stability, deterioration, or improvement     Problem: Respiratory - Adult  Goal: Achieves optimal ventilation and oxygenation  5/8/2024 2216 by Марина Rodriguez RN  Outcome: Progressing  Flowsheets (Taken 5/8/2024 1930)  Achieves optimal ventilation and oxygenation:   Assess for changes in respiratory status   Assess for changes in mentation and behavior   Position to facilitate oxygenation and minimize respiratory effort   Oxygen supplementation based on oxygen saturation or arterial blood gases   Initiate smoking cessation protocol as indicated   Encourage broncho-pulmonary hygiene including cough, deep breathe, incentive spirometry   Assess the need for suctioning and aspirate as needed   Assess and instruct to report shortness of breath or any respiratory difficulty   Respiratory therapy support as indicated  5/8/2024 1616 by Virginia Hurd RN  Outcome: Progressing     Problem: Pain  Goal: Verbalizes/displays adequate comfort level or baseline comfort level  5/8/2024 2216 by Марина Rodriguez RN  Outcome: Progressing  Flowsheets (Taken 5/8/2024

## 2024-05-09 NOTE — DIABETES MGMT
[facility]  DIABETES MANAGEMENT CONSULT    Consulted by Provider for advanced nursing evaluation and care for inpatient blood glucose management.    Evaluation and Action Plan   Marilee Oakes is a 73 year old female patient with diet controlled Type 2 diabetes. Her current A1c is 5.8%. The patient took Metformin in the past but is was d/c'd by her PCP since BG's have been very well controlled. The patient's admission BG was 115.  She has since started on methylprednisolone and BG's are steadily rising. Infection and steroid use are likely primary culprits of BG elevation. The patient will require insulin to help override effects of the steroid. I suspect she will not require insulin once steroid dosing is complete.   BG's are stable on the current regimen. The patient reports having a dialysis treatment yesterday without complications. The patient does look better today. I will continue to use the NPH to override the effects of the steroid. I suspect the patient will not require insulin once steroid dosing is complete.   Steroid dosing continues although it has been decreased.  this morning. I have slightly decreased the insulin linked with the steroid.   Bg's remain stable. The steroid has been further tapered. I have decreased the NPH dose as well. The patient will likely not require insulin was the steroid is stopped. ASAEL for IR procedure (port for dialysis).   Management Rationale Action Plan   Medication   Corrective insulin Using medium dose sensitivity based on weight    Overriding steroid effect Using 0.1units/kg/D based on weight Use 8 units NPH  with  40 mg methylprednisolone    Additional orders          Diabetes Discharge Plan   Medication  TBD   Additional orders            Initial Presentation   Marilee Oakes is a 73 y.o. female admitted  after experiencing complications from ankle surgery. The patient reportedly fractured ankle on March 22, 2024. The patient    Physical exam  General Obese female.  Neuro  Alert, oriented ( ASAEL)  Vital Signs   Vitals:    05/09/24 1035   BP: (!) 148/85   Pulse: 80   Resp:    Temp:    SpO2:          Laboratory  Recent Labs     05/07/24 0443 05/08/24  0401 05/09/24  0020   WBC 4.8 7.8 5.1   HGB 9.6* 8.0* 7.7*   HCT 33.6* 27.7* 26.5*   MCV 86.4 85.0 84.9   PLT 88* 103* 81*       Recent Labs     05/07/24  0443 05/08/24  0401 05/09/24  0020    138 136   K 4.1 4.6 4.1    106 104   CO2 29 29 29   PHOS 5.3* 4.8* 3.2   BUN 55* 61* 32*   CREATININE 2.77* 2.91* 1.89*       Lab Results   Component Value Date    ALT 15 04/26/2024    AST 11 (L) 04/26/2024    ALKPHOS 152 (H) 04/26/2024    BILITOT 0.5 04/26/2024     Lab Results   Component Value Date    TSH 3.28 01/12/2022    IJH5HHS 0.34 (L) 04/28/2024     Lab Results   Component Value Date    LABA1C 5.8 (H) 04/06/2024    LABA1C 5.2 10/27/2023    LABA1C 5.1 07/11/2023       Factors impacting BG management  Factor Dose Comments   Nutrition:  Standard meals     60 grams/meal      Eating well   Drugs:    Steroids       methylprednisolone       Impairs insulin action     Pain PRN Acetaminophen     Infection linezolid    Other:   Kidney function  Liver function       HILARY on CKD  Normal      Blood glucose pattern        Assessment and Nursing Intervention   Nursing Diagnosis Risk for unstable blood glucose pattern   Nursing Intervention Domain 5250 Decision-making Support   Nursing Interventions Examined current inpatient diabetes/blood glucose control   Explored factors facilitating and impeding inpatient management  Explored corrective strategies with patient and responsible inpatient provider   Informed patient of rational for insulin strategy while hospitalized     Nursing Diagnosis 30380 Ineffective Health Management   Nursing Intervention Domain 5250 Decision-making Support   Nursing Interventions Identified diabetes self-management practices impeding diabetes control  Discussed diabetes

## 2024-05-09 NOTE — PROGRESS NOTES
0720-Patient to Angio for permacath.    1035-Patient back from Angio with permacath.  Assess, hooked to overhead monitor, morning meds given.  No c/o pain at this time.    1100-New permacath bleeding.  Quik clot and new dressing applied.    1500-Permacath continues to bleed.  Talked to IR who wait to hold manual pressure over the IJ (above the cath).  Dressing changed by CCL Marquita.    1505-PT/OT working with patient.    1530-Patient up to chair via slide board    1800-Patient attempted to use slide board to get back to bed, but was too weak.  She stood and pivoted on her right leg with the walker and 2 nurses.    1915-Bedside and Verbal shift change report given to AGNES Parish (oncoming nurse) by AGNES Tavares (offgoing nurse). Report included the following information Nurse Handoff Report, Index, Intake/Output, MAR, Recent Results, Cardiac Rhythm V paced, and Neuro Assessment.

## 2024-05-09 NOTE — PLAN OF CARE
Problem: Discharge Planning  Goal: Discharge to home or other facility with appropriate resources  5/8/2024 2216 by Марина Rodriguez, RN  Outcome: Progressing  Flowsheets (Taken 5/8/2024 1930)  Discharge to home or other facility with appropriate resources:   Identify barriers to discharge with patient and caregiver   Arrange for needed discharge resources and transportation as appropriate   Identify discharge learning needs (meds, wound care, etc)   Arrange for interpreters to assist at discharge as needed   Refer to discharge planning if patient needs post-hospital services based on physician order or complex needs related to functional status, cognitive ability or social support system  5/8/2024 1616 by Virginia Hurd, RN  Outcome: Progressing

## 2024-05-09 NOTE — TELEPHONE ENCOUNTER
Patients  ([facility] is calling to reschedule appt for May 14.  Patient is in the hospital    [facility] # [phone]

## 2024-05-09 NOTE — PLAN OF CARE
Problem: Physical Therapy - Adult  Goal: By Discharge: Performs mobility at highest level of function for planned discharge setting.  See evaluation for individualized goals.  Description: FUNCTIONAL STATUS PRIOR TO ADMISSION: The patient is a questionable historian. Presenting from [facility]. Multiple recent hospitalizations.  At [facility] for rehabilitation s/p fall with L ankle fx ORIF done on 3/23/24. pt is NWB and was working on scooting, lateral transfers, and standing at rehab in parallel bars to this point (per patient report). New surgery to remove L ankle hardware with I&D and application of wound vac dressing due to infection and failure of bones to heal on 4/30/24.    HOME SUPPORT PRIOR TO ADMISSION: The patient lived with spouse. Admitted from [facility].    Physical Therapy Goals  Revised 5/9/2024  1.  Patient will move from supine to sit and sit to supine, scoot up and down, and roll side to side in bed with supervision/set-up within 7 day(s).    2.  Patient will perform sit to stand with minimal assistance x 2 within 7 day(s).  3.  Patient will transfer from bed to chair and chair to bed with contact guard assist using sliding board the least restrictive device within 7 day(s).  4.  Patient will transfer from bed to chair and chair to bed with minimum assistance using rolling walker within 7 day(s).     Physical Therapy Goals  Initiated 4/26/2024; Re-assessed 5/3/24 - current goals still appropriate.  1.  Patient will move from supine to sit and sit to supine, scoot up and down, and roll side to side in bed with modified independence within 7 day(s). Not Met 5/9/24.  2.  Patient will perform sit to stand with moderate assistance within 7 day(s). Not Met 5/9/24.  3.  Patient will transfer from bed to chair and chair to bed with contact guard assist using the least restrictive device within 7 day(s). Not Met 5/9/24.  Outcome: Progressing   PHYSICAL THERAPY TREATMENT: WEEKLY REASSESSMENT    Patient: Marilee FRIED  hasn't done an activity recently, how much help from another person do you think they would need if they tried?) Total A Lot A Little None   1.  Turning from your back to your side while in a flat bed without using bedrails? []  1 []  2 [x]  3  []  4   2.  Moving from lying on your back to sitting on the side of a flat bed without using bedrails? []  1 []  2 [x]  3  []  4   3.  Moving to and from a bed to a chair (including a wheelchair)? []  1 [x]  2 []  3  []  4   4. Standing up from a chair using your arms (e.g. wheelchair or bedside chair)? []  1 [x]  2 []  3  []  4   5.  Walking in hospital room? [x]  1 []  2 []  3  []  4   6.  Climbing 3-5 steps with a railing? [x]  1 []  2 []  3  []  4     Raw Score: 12/24                            Cutoff score ?171,2,3 had higher odds of discharging home with home health or need of SNF/IPR.    1. Mariela Mendez, Марина Bonner, Angelo Capps, Radha Zendejas, Allen Franco, Audie Mendez.  Validity of the AM-PAC “6-Clicks” Inpatient Daily Activity and Basic Mobility Short Forms. Physical Therapy Mar 2014, [phone]; DOI: 10.2522/ptj.99987914  2. Tomas SUAREZ, Araceli J, Chris J, Irwin J. Association of AM-PAC \"6-Clicks\" Basic Mobility and Daily Activity Scores With Discharge Destination. Phys Ther. 2021 Apr 4;101(4):krxm976. doi: 10.1093/ptj/prfz279. PMID: 47131150.  3. Yann RAMOS, Elijah SAHU, Violette S, Sarah K, Jazlny S. Activity Measure for Post-Acute Care \"6-Clicks\" Basic Mobility Scores Predict Discharge Destination After Acute Care Hospitalization in Select Patient Groups: A Retrospective, Observational Study. Arch Rehabil Res Clin Transl. 2022 Jul 16;4(3):174275. doi: 10.1016/j.arrct.2022.397354. PMID: 53985643; PMCID: [id number].  4. Andrea BAINS, Shantelle S, Morales W, Cecily P. AM-PAC Short Forms Manual 4.0. Revised 2/2020.

## 2024-05-09 NOTE — PROGRESS NOTES
Hospitalist Progress Note    NAME:   Marilee Oakes   : 1950   MRN: [id number]     Date/Time: 2024 7:49 AM  Patient PCP: No primary care provider on file.    Estimated discharge date:   Barriers: recovery after Orthopedic procedure, renal improvement, final antibiotic plan, Patient and family adamantly do not want patient to go back to [facility], started on HD on , nephrology clearance        Assessment / Plan:     MRSA wound infection  Recurrent UTI  Left ankle fracture s/p ORIF  Blood cultures taken from [facility] were positive for MRSA per MD at [facility], prompting prior admission. No blood cultures here are positive. Reportedly had rash with Daptomycin and now renal failure with Vancomycin.  Left leg bandaged.  Patient is afebrile, white count is normal, procalcitonin is negative.  - PICC line is present  - Ortho : recommended Continue PT/OT NWB LLE in boot  -Patient will continue IV antibiotics for now until she clears her infection.  -Further surgical plans for left ankle will be established in the future once infection is cleared.  Patient will need to see foot and ankle specialist for further surgeries however. Will not be during this hospital stay   - Okay to remove boot for daily dressing changes. Otherwise, must remain in boot. -Stitches to stay until 3 weeks post op      On : Discussed with ID -plan to continue iv antibiotics while in hospital , remove PICC line on discharge and discharge on oral antibiotics.   Discussed with Ortho: Needs Ankle surgery clinic follow up in few weeks for surgery   S/p ceftaroline,   Continue with  IV Zyvox  As per  ID, plan is to discharge patient on p.o. doxycycline end date 5/15       CHF with ICD - mild exacerbation, improving  CAD  A-fib with Watchman device  Continue with aspirin, Imdur, Coreg.  - BNP elevated, coarse lung sounds  - cpap overnight  - holding lasix with renal function declining     Episode of chest pain   Pt c/o burning

## 2024-05-09 NOTE — PROGRESS NOTES
NAME: Marilee Oakes        :  1950        MRN:  [id number]          Assessment :    Plan:  HILARY on CKD stage 3b  Anemia  COPD  CHF with AICD  Afib w/ watchman  Left ankle fracture with infection  Recurrent uti HD initiated , HD MWF for now; ATN; Upc ratio 2.2; No hydro    added amlodipine 5 mg daily on     Hgb < 10; continue retacrit    perm cath today and CM to work on outpatient HD at [facility]        Subjective:     Chief Complaint:  in bed Alert. Not a great historian, but denies complaint. We discussed the above. Her  is at bedside.     Review of Systems:    Symptom Y/N Comments  Symptom Y/N Comments   Fever/Chills    Chest Pain     Poor Appetite    Edema     Cough    Abdominal Pain     Sputum    Joint Pain     SOB/NOVOA    Pruritis/Rash     Nausea/vomit    Tolerating PT/OT     Diarrhea    Tolerating Diet     Constipation    Other       Could not obtain due to:      Objective:     VITALS:   Last 24hrs VS reviewed since prior progress note. Most recent are:  Vitals:    24 0337   BP: (!) 143/80   Pulse: 80   Resp: 17   Temp: 97.9 °F (36.6 °C)   SpO2: (!) 9%       Intake/Output Summary (Last 24 hours) at 2024 0603  Last data filed at 2024 0337  Gross per 24 hour   Intake 800 ml   Output 3800 ml   Net -3000 ml        Telemetry Reviewed:     PHYSICAL EXAM:  General: NAD      Lab Data Reviewed: (see below)    Medications Reviewed: (see below)    PMH/SH reviewed - no change compared to H&P  ________________________________________________________________________  Care Plan discussed with:  Patient     Family      RN     Care Manager                    Consultant:          Comments   >50% of visit spent in counseling and coordination of care       ________________________________________________________________________  Марина Evangelista MD     Procedures: see electronic medical records for all procedures/Xrays and  details which  were not copied into this note but were reviewed prior to creation of Plan.      LABS:  Recent Labs     05/08/24  0401 05/09/24  0020   WBC 7.8 5.1   HGB 8.0* 7.7*   HCT 27.7* 26.5*   * 81*       Recent Labs     05/07/24  0443 05/08/24  0401 05/09/24  0020    138 136   K 4.1 4.6 4.1    106 104   CO2 29 29 29   BUN 55* 61* 32*   MG 1.7 1.6 1.9   PHOS 5.3* 4.8* 3.2       No results for input(s): \"TP\", \"GLOB\", \"GGT\" in the last 72 hours.    Invalid input(s): \"SGOT\", \"GPT\", \"AP\", \"TBIL\", \"ALB\", \"AML\", \"AMYP\", \"LPSE\", \"HLPSE\"  No results for input(s): \"INR\", \"APTT\" in the last 72 hours.    Invalid input(s): \"PTP\"   No results for input(s): \"TIBC\", \"FERR\" in the last 72 hours.    Invalid input(s): \"FE\", \"PSAT\"   No results found for: \"RBCF\"   No results for input(s): \"PH\", \"PCO2\", \"PO2\" in the last 72 hours.  No results for input(s): \"CPK\", \"CKMB\", \"TROPONINI\" in the last 72 hours.  No components found for: \"GLPOC\"  @labua@    MEDICATIONS:  Current Facility-Administered Medications   Medication Dose Route Frequency    insulin NPH (HumuLIN N;NovoLIN N) injection vial 8 Units  8 Units SubCUTAneous Q12H    And    methylPREDNISolone sodium succ (SOLU-MEDROL) 40 mg in sterile water 1 mL injection  40 mg IntraVENous Daily    amLODIPine (NORVASC) tablet 5 mg  5 mg Oral Daily    epoetin gerardo-epbx (RETACRIT) injection 10,000 Units  10,000 Units SubCUTAneous Weekly    balsum peru-castor oil (VENELEX) ointment   Topical BID    hydrALAZINE (APRESOLINE) injection 10 mg  10 mg IntraVENous Q6H PRN    heparin (porcine) 1000 UNIT/ML injection 1,300 Units  1,300 Units IntraCATHeter PRN    And    heparin (porcine) 1000 UNIT/ML injection 1,300 Units  1,300 Units IntraCATHeter PRN    benzonatate (TESSALON) capsule 100 mg  100 mg Oral TID    heparin (porcine) injection 5,000 Units  5,000 Units SubCUTAneous 3 times per day    heparin 2 units/mL solution in 0.9% sodium chloride  200 mL Irrigation Once

## 2024-05-09 NOTE — TELEPHONE ENCOUNTER
Patient is currently admitted. Appointment will be jessica once she is discharged from hospital.

## 2024-05-10 ENCOUNTER — HOSPITAL ENCOUNTER (EMERGENCY)
Facility: HOSPITAL | Age: 74
Discharge: HOME | DRG: 682 | End: 2024-05-11
Attending: EMERGENCY MEDICINE
Payer: MEDICARE

## 2024-05-10 ENCOUNTER — TELEPHONE (OUTPATIENT)
Age: 74
End: 2024-05-10

## 2024-05-10 VITALS
BODY MASS INDEX: 35.59 KG/M2 | SYSTOLIC BLOOD PRESSURE: 128 MMHG | OXYGEN SATURATION: 95 % | RESPIRATION RATE: 19 BRPM | HEIGHT: 69 IN | HEART RATE: 80 BPM | DIASTOLIC BLOOD PRESSURE: 65 MMHG | TEMPERATURE: 97.1 F | WEIGHT: 240.3 LBS

## 2024-05-10 DIAGNOSIS — T14.8XXA BLEEDING FROM WOUND: Primary | ICD-10-CM

## 2024-05-10 LAB
ANION GAP SERPL CALC-SCNC: 5 MMOL/L (ref 5–15)
BASOPHILS # BLD: 0 K/UL (ref 0–0.1)
BASOPHILS NFR BLD: 0 % (ref 0–1)
BUN SERPL-MCNC: 47 MG/DL (ref 6–20)
BUN/CREAT SERPL: 18 (ref 12–20)
CALCIUM SERPL-MCNC: 8.5 MG/DL (ref 8.5–10.1)
CHLORIDE SERPL-SCNC: 105 MMOL/L (ref 97–108)
CO2 SERPL-SCNC: 28 MMOL/L (ref 21–32)
CREAT SERPL-MCNC: 2.67 MG/DL (ref 0.55–1.02)
DIFFERENTIAL METHOD BLD: ABNORMAL
EOSINOPHIL # BLD: 0 K/UL (ref 0–0.4)
EOSINOPHIL NFR BLD: 0 % (ref 0–7)
ERYTHROCYTE [DISTWIDTH] IN BLOOD BY AUTOMATED COUNT: 18 % (ref 11.5–14.5)
GLUCOSE BLD STRIP.AUTO-MCNC: 148 MG/DL (ref 65–117)
GLUCOSE BLD STRIP.AUTO-MCNC: 181 MG/DL (ref 65–117)
GLUCOSE BLD STRIP.AUTO-MCNC: 259 MG/DL (ref 65–117)
GLUCOSE SERPL-MCNC: 209 MG/DL (ref 65–100)
HCT VFR BLD AUTO: 26.5 % (ref 35–47)
HGB BLD-MCNC: 7.7 G/DL (ref 11.5–16)
IMM GRANULOCYTES # BLD AUTO: 0 K/UL (ref 0–0.04)
IMM GRANULOCYTES NFR BLD AUTO: 1 % (ref 0–0.5)
LYMPHOCYTES # BLD: 0.6 K/UL (ref 0.8–3.5)
LYMPHOCYTES NFR BLD: 13 % (ref 12–49)
MAGNESIUM SERPL-MCNC: 1.9 MG/DL (ref 1.6–2.4)
MCH RBC QN AUTO: 24.8 PG (ref 26–34)
MCHC RBC AUTO-ENTMCNC: 29.1 G/DL (ref 30–36.5)
MCV RBC AUTO: 85.2 FL (ref 80–99)
MONOCYTES # BLD: 0.4 K/UL (ref 0–1)
MONOCYTES NFR BLD: 8 % (ref 5–13)
NEUTS SEG # BLD: 3.8 K/UL (ref 1.8–8)
NEUTS SEG NFR BLD: 78 % (ref 32–75)
NRBC # BLD: 0 K/UL (ref 0–0.01)
NRBC BLD-RTO: 0 PER 100 WBC
PHOSPHATE SERPL-MCNC: 4.2 MG/DL (ref 2.6–4.7)
PLATELET # BLD AUTO: 84 K/UL (ref 150–400)
PMV BLD AUTO: 11 FL (ref 8.9–12.9)
POTASSIUM SERPL-SCNC: 4.2 MMOL/L (ref 3.5–5.1)
RBC # BLD AUTO: 3.11 M/UL (ref 3.8–5.2)
RBC MORPH BLD: ABNORMAL
SERVICE CMNT-IMP: ABNORMAL
SODIUM SERPL-SCNC: 138 MMOL/L (ref 136–145)
WBC # BLD AUTO: 4.8 K/UL (ref 3.6–11)

## 2024-05-10 PROCEDURE — 6370000000 HC RX 637 (ALT 250 FOR IP): Performed by: STUDENT IN AN ORGANIZED HEALTH CARE EDUCATION/TRAINING PROGRAM

## 2024-05-10 PROCEDURE — 82962 GLUCOSE BLOOD TEST: CPT

## 2024-05-10 PROCEDURE — 6360000002 HC RX W HCPCS: Performed by: INTERNAL MEDICINE

## 2024-05-10 PROCEDURE — 80048 BASIC METABOLIC PNL TOTAL CA: CPT

## 2024-05-10 PROCEDURE — 6370000000 HC RX 637 (ALT 250 FOR IP): Performed by: INTERNAL MEDICINE

## 2024-05-10 PROCEDURE — 2580000003 HC RX 258: Performed by: INTERNAL MEDICINE

## 2024-05-10 PROCEDURE — 84100 ASSAY OF PHOSPHORUS: CPT

## 2024-05-10 PROCEDURE — 83735 ASSAY OF MAGNESIUM: CPT

## 2024-05-10 PROCEDURE — 2700000000 HC OXYGEN THERAPY PER DAY

## 2024-05-10 PROCEDURE — 85025 COMPLETE CBC W/AUTO DIFF WBC: CPT

## 2024-05-10 PROCEDURE — 6370000000 HC RX 637 (ALT 250 FOR IP)

## 2024-05-10 PROCEDURE — 94640 AIRWAY INHALATION TREATMENT: CPT

## 2024-05-10 PROCEDURE — 90935 HEMODIALYSIS ONE EVALUATION: CPT

## 2024-05-10 PROCEDURE — 36415 COLL VENOUS BLD VENIPUNCTURE: CPT

## 2024-05-10 PROCEDURE — 99233 SBSQ HOSP IP/OBS HIGH 50: CPT | Performed by: INTERNAL MEDICINE

## 2024-05-10 RX ORDER — DOXYCYCLINE HYCLATE 100 MG
100 TABLET ORAL 2 TIMES DAILY
Qty: 10 TABLET | Refills: 0 | Status: ON HOLD | OUTPATIENT
Start: 2024-05-10 | End: 2024-05-15

## 2024-05-10 RX ORDER — PREDNISONE 10 MG/1
TABLET ORAL
Qty: 21 TABLET | Refills: 0 | Status: ON HOLD | OUTPATIENT
Start: 2024-05-11 | End: 2024-05-21

## 2024-05-10 RX ORDER — PREGABALIN 150 MG/1
150 CAPSULE ORAL DAILY
Qty: 30 CAPSULE | Refills: 0 | Status: ON HOLD
Start: 2024-05-10 | End: 2024-06-09

## 2024-05-10 RX ORDER — SELENIUM 50 MCG
1 TABLET ORAL DAILY
Qty: 5 CAPSULE | Refills: 0 | Status: ON HOLD | OUTPATIENT
Start: 2024-05-10 | End: 2024-05-15

## 2024-05-10 RX ORDER — AMLODIPINE BESYLATE 5 MG/1
5 TABLET ORAL DAILY
Qty: 30 TABLET | Refills: 3 | Status: ON HOLD | OUTPATIENT
Start: 2024-05-11

## 2024-05-10 RX ORDER — BENZONATATE 100 MG/1
100 CAPSULE ORAL 3 TIMES DAILY
Qty: 21 CAPSULE | Refills: 0 | Status: ON HOLD | OUTPATIENT
Start: 2024-05-10 | End: 2024-05-17

## 2024-05-10 RX ORDER — POLYETHYLENE GLYCOL 3350 17 G/17G
17 POWDER, FOR SOLUTION ORAL DAILY PRN
Qty: 527 G | Refills: 0 | Status: ON HOLD | OUTPATIENT
Start: 2024-05-10 | End: 2024-06-09

## 2024-05-10 RX ADMIN — HEPARIN SODIUM 1800 UNITS: 1000 INJECTION INTRAVENOUS; SUBCUTANEOUS at 11:46

## 2024-05-10 RX ADMIN — AMLODIPINE BESYLATE 5 MG: 5 TABLET ORAL at 08:30

## 2024-05-10 RX ADMIN — IPRATROPIUM BROMIDE AND ALBUTEROL SULFATE 1 DOSE: .5; 3 SOLUTION RESPIRATORY (INHALATION) at 08:12

## 2024-05-10 RX ADMIN — Medication 4 UNITS: at 17:59

## 2024-05-10 RX ADMIN — ARFORMOTEROL TARTRATE: 15 SOLUTION RESPIRATORY (INHALATION) at 08:12

## 2024-05-10 RX ADMIN — Medication: at 09:00

## 2024-05-10 RX ADMIN — PANTOPRAZOLE SODIUM 40 MG: 40 TABLET, DELAYED RELEASE ORAL at 05:24

## 2024-05-10 RX ADMIN — BENZONATATE 100 MG: 100 CAPSULE ORAL at 08:30

## 2024-05-10 RX ADMIN — BENZONATATE 100 MG: 100 CAPSULE ORAL at 14:00

## 2024-05-10 RX ADMIN — INSULIN HUMAN 8 UNITS: 100 INJECTION, SUSPENSION SUBCUTANEOUS at 08:29

## 2024-05-10 RX ADMIN — SODIUM CHLORIDE, PRESERVATIVE FREE 10 ML: 5 INJECTION INTRAVENOUS at 08:30

## 2024-05-10 RX ADMIN — HEPARIN SODIUM 1800 UNITS: 1000 INJECTION INTRAVENOUS; SUBCUTANEOUS at 11:48

## 2024-05-10 RX ADMIN — PREDNISONE 40 MG: 20 TABLET ORAL at 08:30

## 2024-05-10 RX ADMIN — ASPIRIN 81 MG: 81 TABLET, CHEWABLE ORAL at 08:30

## 2024-05-10 RX ADMIN — CARVEDILOL 25 MG: 12.5 TABLET, FILM COATED ORAL at 17:56

## 2024-05-10 RX ADMIN — LINEZOLID 600 MG: 600 INJECTION, SOLUTION INTRAVENOUS at 08:36

## 2024-05-10 RX ADMIN — ISOSORBIDE MONONITRATE 60 MG: 30 TABLET, EXTENDED RELEASE ORAL at 08:30

## 2024-05-10 RX ADMIN — CARVEDILOL 25 MG: 12.5 TABLET, FILM COATED ORAL at 08:30

## 2024-05-10 ASSESSMENT — PAIN SCALES - GENERAL
PAINLEVEL_OUTOF10: 0

## 2024-05-10 NOTE — PROGRESS NOTES
Infectious Disease Progress        Impression    MRSA bacteremia  Blood cultures + for MRSA at SNF  Details unavailable at this time  D/w SNF MD today, she confirms  Blood cultures 4/3-no growth.  JUAN MANUEL negative.  Negative Blood cultures this admission.  D/c on Daptomycin  S/p macular rash with Daptomycin.  Changed to Vancomycin on 4/22.  Vancomycin discontinued due to worsening Cr.     Bimalleolar left ankle fracture  Non union   with hardware failure  S/p ORIF L/ankle fracture 3/23  Swelling + & serosanguinous drainage at surgical site  S/p incisional wound VAC placement 4/4.  Wound culture 4/3+ for MRSA  Culture reported from drainage at surgical site.  S/p S/p debridement & removal of hardware  Closed reduction & splinting 4/30 by Ortho. Findings of purulence per op note  S/p removal of hardware, plan for wound vacc x 7 days. D/w Dr Farnsworth.    HILARY on CKD3   On HD  Cr 2.77      Acute metabolic encephalopathy  Resolved  Negative CT head.      Yeast + in UA  S/p Fuconazole po x 3 days.       CAD  Acute on chronic systolic CHF  A.fib  Watchmen present.  Retained RV pacing lead+.         Diabetes Mellitus 2   On SSI   A1c 5.8        COPD  LEELA on CPAP      Obesity  BMI 35.81    ESR 18, previously>140  CRP 0.44, previously 6.89      Plan  Continue Linezolid IV  Monitor for adverse events- anemia, leucopenia, thrombo cytopenia  Pt will require MRSA therapy end date 5/15,   May need further extension with p.o. antibiotic per clinical course  MRSA decolonization-nasal mupirocin, Hibiclens skin wipes  Will plan to change to doxycycline po for DC given potential for numerous side ffects with linezolid, more pronounced in elderly.    ID follow up 5/14 at 2 pm    Antimicrobial orders for discharge  -Doxycycline 100 mg p.o. twice daily  - End date 5/15  -Encourage adequate fluids, daily probiotic/yogurt  -If line malfunction occurs and home health cannot reposition  please send patient to ED immediately  -ID follow-up -5/14 at 2  seen on the PM leads. 10) The visualized portions of the ascending aorta appears normal; the aortic arch, and descending aorta have mild atherosclerosis but no mobile atheroma. Conclusion: No vegetations noted.      XR ANKLE LEFT (MIN 3 VIEWS)    Result Date: 4/3/2024  EXAM: XR ANKLE LEFT (MIN 3 VIEWS) INDICATION: recent surgery for alexsandra ankle fracture. COMPARISON: 3/22/2024. FINDINGS: Three views of the left ankle demonstrate interval plate and screw fixation of the fibular fracture with syndesmotic screw. 2 screw fixate the medial malleolar fracture. Fibular fracture demonstrates one half shafts width posterior displacement of the distal fracture fragment. There is less than one half shafts width lateral displacement of the medial malleolar fracture. Bones are osteopenic. Degenerative changes in the midfoot.     1. Postoperative changes of the ankle detailed above.    Vascular duplex lower extremity venous left    Result Date: 4/3/2024    No evidence of deep vein thrombosis in the left lower extremity. No evidence of deep vein or superficial vein thrombosis in the left lower extremity. Vessels demonstrate normal compressibility, color filling, and phasic and spontaneous flow. Indication:DVT hx No evidence of deep vein thrombosis in the left lower extremity. No evidence of deep vein or superficial vein thrombosis in the left lower extremity. Vessels demonstrate normal compressibility, color filling, and phasic and spontaneous flow.       Mariela Marino MD FACP

## 2024-05-10 NOTE — PROGRESS NOTES
ADULT PROTOCOL: JET AEROSOL ASSESSMENT    Patient  Marilee Oakes     73 y.o.   female     5/10/2024  2:16 PM    Breath Sounds Pre Procedure: Breath Sounds Pre-Tx ADELE: Diminished                                  Breath Sounds Pre-Tx LLL: Diminished        Breath Sounds Pre-Tx RUL: Diminished        Breath Sounds Pre-Tx RML: Diminished        Breath Sounds Pre-Tx RLL: Diminished  Breath Sounds Post Procedure: Breath Sounds Post-Tx ADELE: Diminished          Breath Sounds Post-Tx LLL: Diminished          Breath Sounds Post-Tx RUL: Diminished          Breath Sounds Post-Tx RML: Diminished          Breath Sounds Post-Tx RLL: Diminished                                     Heart Rate: Pre procedure Pre-Tx Pulse: 80           Post procedure Post-Tx Pulse: 80    Resp Rate: Pre procedure Pre-Tx Resps: 19           Post procedure Post-Tx Resps: 18    Peak Flow: Pre bronchodilator             Post bronchodilator       Oxygen: O2 Therapy: Room air   nasal cannula     Changed: No    SpO2:  SpO2: (!) 84 %   with Oxygen                Nebulizer Therapy: Current medications Medications: Albuterol/Ipratropium      Changed: No    Comments:        Problem List:   Patient Active Problem List   Diagnosis    Open wound of left lower leg    HBP (high blood pressure)    IBS (irritable bowel syndrome)    Foot pain, right    Ischemic cardiomyopathy    Anxiety    Ingrown toenail of left foot    Asthma    Polypharmacy    Long-term use of high-risk medication    Hypokalemia    Lethargy    Hypoglycemia    Acute exacerbation of COPD with asthma (HCC)    Obesity (BMI 30-39.9)    Hypoxia    Lower back pain    Chronic atrial fibrillation (HCC)    Hypercholesterolemia    Presence of Watchman left atrial appendage closure device    Atrial fibrillation (HCC)    Spinal stenosis, lumbar region, without neurogenic claudication    B12 deficiency    Lower abdominal pain    Type 2 diabetes mellitus with diabetic neuropathy, without long-term current use of  insulin ([facility])    Right knee DJD    Recurrent UTI    Type 2 diabetes with nephropathy ([facility])    Ataxia    Lower extremity edema    Venous stasis dermatitis of both lower extremities    Closed fracture of multiple ribs of right side with routine healing    S/P placement of cardiac pacemaker    Chronic cholecystitis    Cellulitis of left lower leg    Stage 3a chronic kidney disease ([facility])    Anxiety and depression    Chronic systolic heart failure ([facility])    Coronary artery disease due to lipid rich plaque    Intertriginous dermatitis associated with moisture    Acute respiratory failure with hypoxia ([facility])    Acute congestive heart failure, unspecified heart failure type ([facility])    Fever and chills    Nausea and vomiting    MRSA bacteremia    Ulcer of right foot with muscle involvement without evidence of necrosis ([facility])    Diabetic polyneuropathy associated with type 2 diabetes mellitus ([facility])    Poorly controlled diabetes mellitus ([facility])    Presence of combination internal cardiac defibrillator (ICD) and pacemaker    Endocarditis    Heart failure ([facility])    Closed bimalleolar fracture of left ankle    Infection of postoperative wound due to methicillin-resistant Staphylococcus aureus    Surgical site infection    Simple chronic bronchitis ([facility])    Hardware complicating wound infection ([facility])    HILARY (acute kidney injury) ([facility])    Antibiotic causing adverse effect    Yeast UTI    Chronic obstructive pulmonary disease ([facility])    Diabetes mellitus due to underlying condition, controlled, with complication ([facility])    Steroid-induced hyperglycemia    Dialysis patient ([facility])       Respiratory Therapist: Shauna Esquivel RCP

## 2024-05-10 NOTE — PROGRESS NOTES
NAME: Marilee Oakes        :  1950        MRN:  [id number]          Assessment :    Plan:  HILARY on CKD stage 3b  Anemia  COPD  CHF with AICD  Afib w/ watchman  Left ankle fracture with infection  Recurrent uti HD initiated , HD MWF for now - short HD today; ATN; Upc ratio 2.2; No hydro    added amlodipine 5 mg daily on     Hgb < 10; continue retacrit    perm cath  and CM working on outpatient HD at [facility]     Stable for discharge from my standpoint once outpatient HD has been arranged    Will see again on Monday if still here, but please call with questions or changes in the meantime.        Subjective:     Chief Complaint:  in bed Alert. Not a great historian, but denies complaint. We discussed the above.     The patient was seen on dialysis at 11:15 am .  BP is stable. Catheter is functioning well. D/W HD nurse.      Review of Systems:    Symptom Y/N Comments  Symptom Y/N Comments   Fever/Chills    Chest Pain     Poor Appetite    Edema     Cough    Abdominal Pain     Sputum    Joint Pain     SOB/NOVOA    Pruritis/Rash     Nausea/vomit    Tolerating PT/OT     Diarrhea    Tolerating Diet     Constipation    Other       Could not obtain due to:      Objective:     VITALS:   Last 24hrs VS reviewed since prior progress note. Most recent are:  Vitals:    05/10/24 0306   BP: 125/75   Pulse: 88   Resp: 16   Temp: 97.8 °F (36.6 °C)   SpO2: 94%       Intake/Output Summary (Last 24 hours) at 5/10/2024 0558  Last data filed at 2024 1507  Gross per 24 hour   Intake 960.4 ml   Output --   Net 960.4 ml        Telemetry Reviewed:     PHYSICAL EXAM:  General: NAD      Lab Data Reviewed: (see below)    Medications Reviewed: (see below)    PMH/SH reviewed - no change compared to H&P  ________________________________________________________________________  Care Plan discussed with:  Patient     Family      RN     Care Manager                     Consultant:          Comments   >50% of visit spent in counseling and coordination of care       ________________________________________________________________________  Марина Evangelista MD     Procedures: see electronic medical records for all procedures/Xrays and details which  were not copied into this note but were reviewed prior to creation of Plan.      LABS:  Recent Labs     05/09/24  0020 05/10/24  0309   WBC 5.1 4.8   HGB 7.7* 7.7*   HCT 26.5* 26.5*   PLT 81* 84*       Recent Labs     05/08/24  0401 05/09/24  0020 05/10/24  0309    136 138   K 4.6 4.1 4.2    104 105   CO2 29 29 28   BUN 61* 32* 47*   MG 1.6 1.9 1.9   PHOS 4.8* 3.2 4.2       No results for input(s): \"TP\", \"GLOB\", \"GGT\" in the last 72 hours.    Invalid input(s): \"SGOT\", \"GPT\", \"AP\", \"TBIL\", \"ALB\", \"AML\", \"AMYP\", \"LPSE\", \"HLPSE\"  No results for input(s): \"INR\", \"APTT\" in the last 72 hours.    Invalid input(s): \"PTP\"   No results for input(s): \"TIBC\", \"FERR\" in the last 72 hours.    Invalid input(s): \"FE\", \"PSAT\"   No results found for: \"RBCF\"   No results for input(s): \"PH\", \"PCO2\", \"PO2\" in the last 72 hours.  No results for input(s): \"CPK\", \"CKMB\", \"TROPONINI\" in the last 72 hours.  No components found for: \"GLPOC\"  @labua@    MEDICATIONS:  Current Facility-Administered Medications   Medication Dose Route Frequency    heparin (porcine) injection 10,000 Units  10,000 Units IntraCATHeter Once    insulin NPH (HumuLIN N;NovoLIN N) injection vial 8 Units  8 Units SubCUTAneous Daily    predniSONE (DELTASONE) tablet 40 mg  40 mg Oral Daily    amLODIPine (NORVASC) tablet 5 mg  5 mg Oral Daily    epoetin gerardo-epbx (RETACRIT) injection 10,000 Units  10,000 Units SubCUTAneous Weekly    balsum peru-castor oil (VENELEX) ointment   Topical BID    hydrALAZINE (APRESOLINE) injection 10 mg  10 mg IntraVENous Q6H PRN    heparin (porcine) 1000 UNIT/ML injection 1,300 Units  1,300 Units IntraCATHeter PRN    And    heparin (porcine) 1000 UNIT/ML

## 2024-05-10 NOTE — FLOWSHEET NOTE
05/10/24 0906   Treatment   Time On 0900   Treatment Goal 2 L   Observations & Evaluations   Level of Consciousness 0   Oriented X 3   Heart Rhythm Regular   Respiratory Quality/Effort Unlabored   O2 Device None (Room air)   Skin Condition/Temp Dry;Warm   Edema Right lower extremity;Left lower extremity   Vital Signs   /60   Temp 97.5 °F (36.4 °C)   Pulse 80   Respirations 18   Pain Assessment   Pain Assessment None - Denies Pain   Technical Checks   Dialysis Machine No. 09   RO Machine Number [id number]   Dialyzer Lot No. [id number]   Tubing Lot Number [id number]   All Connections Secure Yes   NS Bag Yes   Saline Line Double Clamped Yes   Dialyzer Revaclear 300   Prime Volume (mL) 200 mL   ICEBOAT I;C;E;B;O;A;T   RO Machine Log Sheet Completed Yes   Machine Alarm Self Test Completed;Passed   Air Foam Detector Tested;Proper Function;pH Reading   Extracorporeal Circuit Tested for Integrity Yes   Machine Conductivity 13.9   Machine Ph 7.4   Bleach Test (Neg) Yes   Bath Temperature 98.6 °F (37 °C)   Treatment Initiation   Dialyze Hours 2.5   Treatment  Initiation Universal Precautions maintained;Lines secured to patient;Connections secured;Prime given;Venous Parameters set;Arterial Parameters set;Air foam detector engaged;Dialysate;Saline line double clamped;Dialyzer;Revaclear Dialyzer;REV-300   During Hemodialysis Assessment   Blood Flow Rate (ml/min) 400 ml/min   Arterial Pressure (mmHg) -130 mmHg   Venous Pressure (mmHg) 100   TMP 70      Comments HD tx started   Access Visible Yes   Ultrafiltration Rate (ml/hr) 1000 ml/hr   Ultrafiltration Removed (ml) 0 ml   Hemodialysis Central Access Right Subclavian   Placement Date/Time: 05/09/24 1100   Present on Admission/Arrival: No  Inserted by: IF  Orientation: Right  Access Location: Subclavian   Continued need for line? Yes   Site Assessment Intact   CVC Lumen Status Flushed   Venous Lumen Status Brisk blood return   Arterial Lumen Status Brisk blood return

## 2024-05-10 NOTE — DISCHARGE INSTRUCTIONS
HOSPITALIST DISCHARGE INSTRUCTIONS    NAME: Marilee Oakes   :  1950   MRN:  [id number]     Date/Time:  5/10/2024 3:40 PM    ADMIT DATE: 2024     DISCHARGE DATE: 5/10/2024     DISCHARGE DIAGNOSIS:  MRSA wound infection  Recurrent UTI  Left ankle fracture s/p ORIF  CHF with ICD - mild exacerbation, improving  CAD  A-fib with Watchman device  Episode of burning chest pain likely from GERD  Acute on chronic renal failure   CKD 3 with baseline creatinine between 1.2-1.6.-Now requiring HD   COPD - exacerbation, improving  Chronic respiratory failure - 2L at baseline  Toxic metabolic encephalopathy - resolved   Intermittent confusion and agitation at night-possible delirium   Possible euthyroid sick syndrome  Anemia  Hypokalemia-resolved   Diabetes mellitus with hyperglycemia  Neuropathy  Anxiety    MEDICATIONS:  As per medication reconciliation  list  It is important that you take the medication exactly as they are prescribed.   Keep your medication in the bottles provided by the pharmacist and keep a list of the medication names, dosages, and times to be taken in your wallet.   Do not take other medications without consulting your doctor.     Pain Management: per above medications    What to do at Home:  Antimicrobial orders for discharge  -Doxycycline 100 mg p.o. twice daily  - End date 5/15  -Encourage adequate fluids, daily probiotic/yogurt  -If line malfunction occurs and home health cannot reposition  please send patient to ED immediately  -ID follow-up - at 2 PM  - If persistent side effects occur stop antibiotic and call-ID/PCP    HD as scheduled by renal team   Bumetanide  discontinued   Take NPH insulin only for 3 more days then stop.   need to see foot and ankle specialist for further surgeries  Okay to remove boot for daily dressing changes. Otherwise, must remain in boot. -  Stitches to stay until 3 weeks post op       Recommended diet:  renal diet    Recommended activity:

## 2024-05-10 NOTE — PROGRESS NOTES
Patient discharge ome with family member [facility] patient at home sister is following them. Discharge instruction given to family members. Right arm picc removed pressure dressing applied

## 2024-05-10 NOTE — PROGRESS NOTES
Hospitalist Progress Note    NAME:   Marilee Oakes   : 1950   MRN: [id number]     Date/Time: 5/10/2024 8:04 AM  Patient PCP: No primary care provider on file.    Estimated discharge date:   Barriers: , Patient and family adamantly do not want patient to go back to [facility], needs HD chair outpatient       Assessment / Plan:     MRSA wound infection  Recurrent UTI  Left ankle fracture s/p ORIF  Blood cultures taken from [facility] were positive for MRSA per MD at [facility], prompting prior admission. No blood cultures here are positive. Reportedly had rash with Daptomycin and now renal failure with Vancomycin.  Left leg bandaged.  Patient is afebrile, white count is normal, procalcitonin is negative.  - PICC line is present  - Ortho : recommended Continue PT/OT NWB LLE in boot  -Further surgical plans for left ankle will be established in the future once infection is cleared.  Patient will need to see foot and ankle specialist for further surgeries however. Will not be during this hospital stay   - Okay to remove boot for daily dressing changes. Otherwise, must remain in boot. -Stitches to stay until 3 weeks post op      -S/p ceftaroline,   -Continue with  IV Zyvox  -Appreciated ID recs    Antimicrobial orders for discharge  -Doxycycline 100 mg p.o. twice daily  - End date 5/15  -Encourage adequate fluids, daily probiotic/yogurt  -If line malfunction occurs and home health cannot reposition  please send patient to ED immediately  -ID follow-up - at 2 PM  - If persistent side effects occur stop antibiotic and call-ID/PCP       CHF with ICD - mild exacerbation, improving  CAD  A-fib with Watchman device  Continue with aspirin, Imdur, Coreg.  - BNP elevated, coarse lung sounds  - cpap overnight  - holding lasix with renal function declining     Episode of chest pain   Pt c/o burning chest pain likely from GERD  -Continue with PPI    Acute on chronic renal failure   CKD 3 with baseline creatinine

## 2024-05-10 NOTE — PLAN OF CARE
Problem: Discharge Planning  Goal: Discharge to home or other facility with appropriate resources  5/9/2024 2003 by Марина Rodriguez RN  Outcome: Progressing  Flowsheets  Taken 5/9/2024 1908 by Марина Rodriguez RN  Discharge to home or other facility with appropriate resources:   Identify barriers to discharge with patient and caregiver   Arrange for needed discharge resources and transportation as appropriate   Identify discharge learning needs (meds, wound care, etc)   Arrange for interpreters to assist at discharge as needed   Refer to discharge planning if patient needs post-hospital services based on physician order or complex needs related to functional status, cognitive ability or social support system  Taken 5/9/2024 0720 by Anusha Miles RN  Discharge to home or other facility with appropriate resources: Identify barriers to discharge with patient and caregiver     Problem: Skin/Tissue Integrity  Goal: Absence of new skin breakdown  Description: 1.  Monitor for areas of redness and/or skin breakdown  2.  Assess vascular access sites hourly  3.  Every 4-6 hours minimum:  Change oxygen saturation probe site  4.  Every 4-6 hours:  If on nasal continuous positive airway pressure, respiratory therapy assess nares and determine need for appliance change or resting period.  5/10/2024 0949 by Ada Dawson RN  Outcome: Progressing  5/9/2024 2003 by Марина Rodriguez RN  Outcome: Progressing     Problem: Safety - Adult  Goal: Free from fall injury  5/10/2024 0949 by Ada Dawson RN  Outcome: Progressing  5/9/2024 2003 by Марина Rodriguez RN  Outcome: Progressing     Problem: ABCDS Injury Assessment  Goal: Absence of physical injury  5/9/2024 2003 by Марина Rodriguez RN  Outcome: Progressing     Problem: Chronic Conditions and Co-morbidities  Goal: Patient's chronic conditions and co-morbidity symptoms are monitored and maintained or improved  5/10/2024 0949 by Ada Dawson RN  Outcome:

## 2024-05-10 NOTE — PLAN OF CARE
Problem: Discharge Planning  Goal: Discharge to home or other facility with appropriate resources  Outcome: Progressing  Flowsheets  Taken 5/9/2024 1908 by Марина Rodriguez RN  Discharge to home or other facility with appropriate resources:   Identify barriers to discharge with patient and caregiver   Arrange for needed discharge resources and transportation as appropriate   Identify discharge learning needs (meds, wound care, etc)   Arrange for interpreters to assist at discharge as needed   Refer to discharge planning if patient needs post-hospital services based on physician order or complex needs related to functional status, cognitive ability or social support system  Taken 5/9/2024 0720 by Anusha Miles RN  Discharge to home or other facility with appropriate resources: Identify barriers to discharge with patient and caregiver     Problem: Skin/Tissue Integrity  Goal: Absence of new skin breakdown  Description: 1.  Monitor for areas of redness and/or skin breakdown  2.  Assess vascular access sites hourly  3.  Every 4-6 hours minimum:  Change oxygen saturation probe site  4.  Every 4-6 hours:  If on nasal continuous positive airway pressure, respiratory therapy assess nares and determine need for appliance change or resting period.  Outcome: Progressing     Problem: Safety - Adult  Goal: Free from fall injury  Outcome: Progressing     Problem: ABCDS Injury Assessment  Goal: Absence of physical injury  Outcome: Progressing     Problem: Chronic Conditions and Co-morbidities  Goal: Patient's chronic conditions and co-morbidity symptoms are monitored and maintained or improved  Outcome: Progressing  Flowsheets  Taken 5/9/2024 1908 by Марина Rodriguez RN  Care Plan - Patient's Chronic Conditions and Co-Morbidity Symptoms are Monitored and Maintained or Improved:   Monitor and assess patient's chronic conditions and comorbid symptoms for stability, deterioration, or improvement   Collaborate with multidisciplinary  participate in upper extremity therapeutic exercise/activities with Supervision for 10 minutes within 7 day(s).    7.  Patient will utilize energy conservation techniques during functional activities with verbal cues within 7 day(s).  5/9/2024 1559 by Tom Vargas, OTR/L  Outcome: Progressing     Problem: Respiratory - Adult  Goal: Achieves optimal ventilation and oxygenation  5/9/2024 2003 by Марина Rodriguez RN  Outcome: Progressing  Flowsheets (Taken 5/9/2024 1908)  Achieves optimal ventilation and oxygenation:   Assess for changes in respiratory status   Assess for changes in mentation and behavior   Position to facilitate oxygenation and minimize respiratory effort   Oxygen supplementation based on oxygen saturation or arterial blood gases   Initiate smoking cessation protocol as indicated   Encourage broncho-pulmonary hygiene including cough, deep breathe, incentive spirometry   Assess the need for suctioning and aspirate as needed   Assess and instruct to report shortness of breath or any respiratory difficulty   Respiratory therapy support as indicated  5/9/2024 1011 by Matt Hernandez, RT  Outcome: Progressing  Flowsheets (Taken 5/9/2024 0720 by Anusha Miles RN)  Achieves optimal ventilation and oxygenation: Assess for changes in respiratory status     Problem: Pain  Goal: Verbalizes/displays adequate comfort level or baseline comfort level  Outcome: Progressing  Flowsheets  Taken 5/9/2024 1908 by Марина Rodriguez RN  Verbalizes/displays adequate comfort level or baseline comfort level:   Encourage patient to monitor pain and request assistance   Assess pain using appropriate pain scale   Administer analgesics based on type and severity of pain and evaluate response   Implement non-pharmacological measures as appropriate and evaluate response   Consider cultural and social influences on pain and pain management   Notify Licensed Independent Practitioner if interventions unsuccessful or patient reports new

## 2024-05-10 NOTE — DISCHARGE SUMMARY
Discharge Summary    Name: Marilee Oakes  [id number]  YOB: 1950 (Age: 73 y.o.)   Date of Admission: 4/25/2024  Date of Discharge: 5/10/2024  Attending Physician: Cecy Hackett MD    Discharge Diagnosis:   MRSA wound infection  Recurrent UTI  Left ankle fracture s/p ORIF  CHF with ICD - mild exacerbation, improving  CAD  A-fib with Watchman device  Episode of burning chest pain likely from GERD  Acute on chronic renal failure   CKD 3 with baseline creatinine between 1.2-1.6.-Now requiring HD   COPD - exacerbation, improving  Chronic respiratory failure - 2L at baseline  Toxic metabolic encephalopathy - resolved   Intermittent confusion and agitation at night-possible delirium   Possible euthyroid sick syndrome  Anemia  Hypokalemia-resolved   Diabetes mellitus with hyperglycemia  Neuropathy  Anxiety    Consultations:  IP CONSULT TO NEPHROLOGY  IP CONSULT TO INFECTIOUS DISEASES  IP CONSULT TO ORTHOPEDIC SURGERY  IP CONSULT TO DIABETES MANAGEMENT  IP WOUND CARE NURSE CONSULT TO EVAL  IP WOUND CARE NURSE CONSULT TO EVAL  IP CONSULT TO CASE MANAGEMENT  IP CONSULT TO CASE MANAGEMENT  IP CONSULT TO CASE MANAGEMENT      Brief Admission History/Reason for Admission Per Christa Ordonez MD:   Marilee Oakes is a 73 y.o.  female with PMHx significant for Obesity with BMI of 33 and has COPD, chronic respiratory failure on 2 L of oxygen, obesity, LEELA on CPAP, HTN, HLD, CAD, CHF, status post AICD, A-fib and had watchman's device, diabetes mellitus, neuropathy, CKD 3 with baseline creatinine between 1.2-1.6 IBS, anxiety, GERD.  Patient had left bimalleolar fracture and underwent ORIF and subsequently developed infection of the left ankle and is [facility]  Today patient is brought to the ED with several complaints including worsening mentation for the last several days, she looks more pale.  Also had UTI in the rehab for which she was  PATIENT APPOINTMENT. Please contact office with any questions.    [facility]    Follow up  This home health company will follow you at home.    Mariela Marino MD  [address] [facility] [address] [phone]    Go on 5/14/2024            Total time in minutes spent coordinating this discharge (includes going over instructions, follow-up, prescriptions, and preparing report for sign off to her PCP) :  35 minutes

## 2024-05-10 NOTE — DIABETES MGMT
[facility] DIABETES HEALTH  DIABETES MANAGEMENT CONSULT    Consulted by Provider for advanced nursing evaluation and care for inpatient blood glucose management.    Evaluation and Action Plan   Marilee Oakes is a 73 year old female patient with diet controlled Type 2 diabetes. Her current A1c is 5.8%. The patient took Metformin in the past but is was d/c'd by her PCP since BG's have been very well controlled. The patient's admission BG was 115.  She has since started on methylprednisolone and BG's are steadily rising. Infection and steroid use are likely primary culprits of BG elevation. The patient will require insulin to help override effects of the steroid. I suspect she will not require insulin once steroid dosing is complete.   BG's are stable on the current regimen. The patient reports having a dialysis treatment yesterday without complications. The patient does look better today. I will continue to use the NPH to override the effects of the steroid. I suspect the patient will not require insulin once steroid dosing is complete.   Steroid dosing continues although it has been decreased.  this morning. I have slightly decreased the insulin linked with the steroid.   Bg's remain stable. The steroid has been further tapered. I have decreased the NPH dose as well. The patient will likely not require insulin was the steroid is stopped. ASAEL for IR procedure (port for dialysis).     5/10: Adjusted to ral once daily steroid on 5/10.  BG trends remain in target on conservative NPH insulin to cover for steroid. Noted NPH held on 5/9 with fasting  today.   Management Rationale Action Plan   Medication   Corrective insulin Using medium dose sensitivity based on weight    Overriding steroid effect Using 0.1units/kg/D based on weight DISCONTINUE NPH when oral steroid adjusted down to 20 mg   Additional orders          Diabetes Discharge Plan   Medication  Will likely not require any diabetes

## 2024-05-10 NOTE — PROGRESS NOTES
Comprehensive Nutrition Assessment    Type and Reason for Visit:  Reassess    Nutrition Recommendations/Plan:   Continue current diet      Malnutrition Assessment:  Malnutrition Status:  No malnutrition (05/04/24 1345)      Nutrition Assessment:    Chart reviewed; patient receiving a carb controlled/cardiac diet. Good PO intake per flowsheets. Potassium and phos WNL. Patient confused at time of visit; only focus was \"getting out of here.\" Discharge planning noted. Continue current nutrition plan of care. Encourage intake of meals.     Patient Vitals for the past 120 hrs:   PO Meals Eaten (%)   05/10/24 0945 51 - 75%   05/09/24 1136 76 - 100%     Nutrition Related Findings:    K+ 4.2, Phos 4.2, -[phone]   BM 5/10   Norvasc, Humalog, NPH, Prednisone   Wound Type: Surgical Incision       Current Nutrition Intake & Therapies:    Average Meal Intake: 51-75%, %  Average Supplements Intake: None Ordered  ADULT DIET; Regular; 3 carb choices (45 gm/meal); Low Fat/Low Chol/High Fiber/KAREEM; Low Sodium (2 gm)    Anthropometric Measures:  Height: 175.3 cm (5' 9\")  Ideal Body Weight (IBW): 145 lbs (66 kg)       Current Body Weight: 109 kg (240 lb 4.8 oz), 165.7 % IBW. Weight Source: Bed Scale  Current BMI (kg/m2): 35.5        Weight Adjustment For: No Adjustment                 BMI Categories: Obese Class 2 (BMI 35.0 -39.9)    Estimated Daily Nutrient Needs:  Energy Requirements Based On: Formula  Weight Used for Energy Requirements: Current  Energy (kcal/day): 1770 kcals (BMR x 1.3AF - 400)  Weight Used for Protein Requirements: Ideal  Protein (g/day): 99g (1.5g/kg IBW)  Method Used for Fluid Requirements: 1 ml/kcal  Fluid (ml/day): 1800mL    Nutrition Diagnosis:   No nutrition diagnosis at this time     Nutrition Interventions:   Food and/or Nutrient Delivery: Continue Current Diet  Nutrition Education/Counseling: No recommendation at this time  Coordination of Nutrition Care: Continue to monitor while  inpatient       Goals:     Goals: PO intake 75% or greater, by next RD assessment       Nutrition Monitoring and Evaluation:   Behavioral-Environmental Outcomes: None Identified  Food/Nutrient Intake Outcomes: Food and Nutrient Intake  Physical Signs/Symptoms Outcomes: Biochemical Data, Weight    Discharge Planning:    Continue current diet     Katherine Lai RD  Contact: ext [phone]

## 2024-05-10 NOTE — CARE COORDINATION
Pt is cleared for d/c from a CM standpoint.          Transition of Care Plan:     RUR: 30%    Prior Level of Functioning: assistance with ADLs/IADLs: Bathing, Housework, Shopping and Mobility   Disposition:  home with [facility] & new HD set up at [facility] SUAREZ W, F at 11:45 a.m.   If SNF or IPR: Date FOC offered: Date FOC received:   Accepting facility:   Date authorization started with reference number: no auth needed  Date authorization received and expires:      Follow up appointments:PCP: Kevan: 6/21/24  ID: Ned: 5/14 at 2 PM   DME needed: patient has multiple DME at home  Transportation at discharge: [facility] at 6:30 p.m.  IM/IMM Medicare/ letter given: 2nd IM provided  Is patient a [place] and connected with [facility]? N/a              If yes, was  transfer form completed and [facility] notified?   Caregiver Contact: Spouse: Wesley Oakes; [phone]  Discharge Caregiver contacted prior to discharge? yes  Care Conference needed? N/a  Barriers to discharge:                  4:13 p.m. CM called Anush[facility] of [facility] to confirm they received PCS needed to transport pt home.    4:04 p.m. CM faxed final ID recs, d/c summary, notes and labs to [facility] at [phone].     3:33 p.m. [facility] will transport pt at 6:30 p.m.  PCS faxed to them at [phone] and copy on bedside chart.  CM updated pt at bedside and sister at bedside.  CM sent updated order to [facility] as well as H&P.    3:18 p.m. CM informed attending physician who will work on d/c.  CM met with pt to review 2IM.  Signed copy placed on bedside chart.  CM called [facility] to transport.    3:05 p.m. CM called pt's  to let him know HD is set up.  CM asked about home DME.  Pt has all recommended equipment.  CM educated pt's spouse on [employer] for a reduced cost and provided phone number of [phone].  CM also gave him H2H's #.  CM encouraged him to work with  at facility who can assist with ongoing transportation needs.      2:40 p.m. CM received a call from [facility] who reported pt's chair time with be 11:45 a.m. M, W and F. Pt will start Monday, 5/13/24 and will need to arrive at 10:45 a.m. Fabby will need d/c summary once entered faxed to [phone].     10:49 a.m. CM spoke with [facility].  They are waiting on insurance to approve as well as location.  Fabby will call this CM back once details are known.    CM called [facility] to follow up on the referral previously sent.  [facility]by aguero [facility] [phone] asked that referral be faxed to [phone].   CM faxed clinicals.         05/10/24 8964   Services At/After Discharge   Transition of Care Consult (CM Consult) Discharge Planning   Services At/After Discharge Home Health;Outpatient care transitions  ([facility] and new  set up at [facility].)   Mode of Transport at Discharge BLS  ([facility] at 6:30 p.m.)   Condition of Participation: Discharge Planning   The Patient and/or Patient Representative was provided with a Choice of Provider? Patient;Patient Representative   Name of the Patient Representative who was provided with the Choice of Provider and agrees with the Discharge Plan?  Mr. Oakes   The Patient and/Or Patient Representative agree with the Discharge Plan? Yes   Freedom of Choice list was provided with basic dialogue that supports the patient's individualized plan of care/goals, treatment preferences, and shares the quality data associated with the providers?  Yes           Camille Arevalo, LOTTIE  Supervisee in Social Work  Care Management, [facility]  [phone]

## 2024-05-10 NOTE — FLOWSHEET NOTE
05/10/24 1140   Vital Signs   /68   Temp 98 °F (36.7 °C)   Pulse 80   Respirations 19   Pain Assessment   Pain Assessment None - Denies Pain   Post-Hemodialysis Assessment   Post-Treatment Procedures Blood returned;Catheter capped, clamped and heparinized x 2 ports   Machine Disinfection Process Acid/Vinegar Clean;Heat Disinfect;Exterior Machine Disinfection   Rinseback Volume (ml) 200 ml   Blood Volume Processed (Liters) 58 L   Dialyzer Clearance Lightly streaked   Duration of Treatment (minutes) 150 minutes   Hemodialysis Intake (ml) 400 ml   Hemodialysis Output (ml) 2400 ml   NET Removed (ml) 2000   Tolerated Treatment Good   Edema Right lower extremity;Left lower extremity   Time Off 1130   Observations & Evaluations   Level of Consciousness 0   Oriented X 3   Respiratory Quality/Effort Unlabored   Skin Condition/Temp Dry;Warm     Primary RN SBAR: Ada Dawson RN    Comments: Tolerated tx well

## 2024-05-11 ENCOUNTER — HOSPITAL ENCOUNTER (INPATIENT)
Facility: HOSPITAL | Age: 74
LOS: 7 days | Discharge: NURSING, REHAB OR LONG-TERM CARE | DRG: 682 | End: 2024-05-18
Attending: EMERGENCY MEDICINE | Admitting: INTERNAL MEDICINE
Payer: MEDICARE

## 2024-05-11 ENCOUNTER — APPOINTMENT (OUTPATIENT)
Facility: HOSPITAL | Age: 74
DRG: 682 | End: 2024-05-11
Payer: MEDICARE

## 2024-05-11 VITALS
HEART RATE: 84 BPM | TEMPERATURE: 97.8 F | SYSTOLIC BLOOD PRESSURE: 161 MMHG | DIASTOLIC BLOOD PRESSURE: 84 MMHG | BODY MASS INDEX: 35.44 KG/M2 | WEIGHT: 240 LBS | RESPIRATION RATE: 18 BRPM | OXYGEN SATURATION: 97 %

## 2024-05-11 DIAGNOSIS — B95.62 INFECTION OF POSTOPERATIVE WOUND DUE TO METHICILLIN-RESISTANT STAPHYLOCOCCUS AUREUS: ICD-10-CM

## 2024-05-11 DIAGNOSIS — T81.49XA INFECTION OF POSTOPERATIVE WOUND DUE TO METHICILLIN-RESISTANT STAPHYLOCOCCUS AUREUS: ICD-10-CM

## 2024-05-11 DIAGNOSIS — D69.6 THROMBOCYTOPENIA (HCC): ICD-10-CM

## 2024-05-11 DIAGNOSIS — T14.8XXA DRAINAGE FROM WOUND: Primary | ICD-10-CM

## 2024-05-11 PROBLEM — Z78.9 UNABLE TO CARE FOR SELF: Status: ACTIVE | Noted: 2024-05-11

## 2024-05-11 LAB
ALBUMIN SERPL-MCNC: 2.8 G/DL (ref 3.5–5)
ALBUMIN/GLOB SERPL: 1 (ref 1.1–2.2)
ALP SERPL-CCNC: 108 U/L (ref 45–117)
ALT SERPL-CCNC: 12 U/L (ref 12–78)
ANION GAP SERPL CALC-SCNC: 3 MMOL/L (ref 5–15)
APPEARANCE UR: ABNORMAL
AST SERPL-CCNC: 11 U/L (ref 15–37)
BACTERIA URNS QL MICRO: NEGATIVE /HPF
BASOPHILS # BLD: 0 K/UL (ref 0–0.1)
BASOPHILS NFR BLD: 0 % (ref 0–1)
BILIRUB SERPL-MCNC: 1.1 MG/DL (ref 0.2–1)
BILIRUB UR QL: NEGATIVE
BUN SERPL-MCNC: 41 MG/DL (ref 6–20)
BUN/CREAT SERPL: 17 (ref 12–20)
CALCIUM SERPL-MCNC: 8.3 MG/DL (ref 8.5–10.1)
CHLORIDE SERPL-SCNC: 106 MMOL/L (ref 97–108)
CO2 SERPL-SCNC: 32 MMOL/L (ref 21–32)
COLOR UR: ABNORMAL
CREAT SERPL-MCNC: 2.42 MG/DL (ref 0.55–1.02)
DIFFERENTIAL METHOD BLD: ABNORMAL
EOSINOPHIL # BLD: 0.1 K/UL (ref 0–0.4)
EOSINOPHIL NFR BLD: 1 % (ref 0–7)
EPITH CASTS URNS QL MICRO: ABNORMAL /LPF
ERYTHROCYTE [DISTWIDTH] IN BLOOD BY AUTOMATED COUNT: 18.5 % (ref 11.5–14.5)
GLOBULIN SER CALC-MCNC: 2.9 G/DL (ref 2–4)
GLUCOSE BLD STRIP.AUTO-MCNC: 132 MG/DL (ref 65–117)
GLUCOSE BLD STRIP.AUTO-MCNC: 155 MG/DL (ref 65–117)
GLUCOSE SERPL-MCNC: 156 MG/DL (ref 65–100)
GLUCOSE UR STRIP.AUTO-MCNC: NEGATIVE MG/DL
HCT VFR BLD AUTO: 27.1 % (ref 35–47)
HGB BLD-MCNC: 7.8 G/DL (ref 11.5–16)
HGB UR QL STRIP: ABNORMAL
HYALINE CASTS URNS QL MICRO: ABNORMAL /LPF (ref 0–2)
IMM GRANULOCYTES # BLD AUTO: 0 K/UL (ref 0–0.04)
IMM GRANULOCYTES NFR BLD AUTO: 0 % (ref 0–0.5)
KETONES UR QL STRIP.AUTO: NEGATIVE MG/DL
LEUKOCYTE ESTERASE UR QL STRIP.AUTO: ABNORMAL
LYMPHOCYTES # BLD: 0.6 K/UL (ref 0.8–3.5)
LYMPHOCYTES NFR BLD: 11 % (ref 12–49)
MAGNESIUM SERPL-MCNC: 1.7 MG/DL (ref 1.6–2.4)
MCH RBC QN AUTO: 24.5 PG (ref 26–34)
MCHC RBC AUTO-ENTMCNC: 28.8 G/DL (ref 30–36.5)
MCV RBC AUTO: 85.2 FL (ref 80–99)
MONOCYTES # BLD: 0.5 K/UL (ref 0–1)
MONOCYTES NFR BLD: 8 % (ref 5–13)
NEUTS SEG # BLD: 4.7 K/UL (ref 1.8–8)
NEUTS SEG NFR BLD: 80 % (ref 32–75)
NITRITE UR QL STRIP.AUTO: NEGATIVE
NRBC # BLD: 0 K/UL (ref 0–0.01)
NRBC BLD-RTO: 0 PER 100 WBC
PH UR STRIP: 5.5 (ref 5–8)
PLATELET # BLD AUTO: 84 K/UL (ref 150–400)
PLATELET COMMENT: ABNORMAL
PMV BLD AUTO: 12.4 FL (ref 8.9–12.9)
POTASSIUM SERPL-SCNC: 3.7 MMOL/L (ref 3.5–5.1)
PROT SERPL-MCNC: 5.7 G/DL (ref 6.4–8.2)
PROT UR STRIP-MCNC: 100 MG/DL
RBC # BLD AUTO: 3.18 M/UL (ref 3.8–5.2)
RBC #/AREA URNS HPF: ABNORMAL /HPF (ref 0–5)
RBC MORPH BLD: ABNORMAL
RBC MORPH BLD: ABNORMAL
SERVICE CMNT-IMP: ABNORMAL
SERVICE CMNT-IMP: ABNORMAL
SODIUM SERPL-SCNC: 141 MMOL/L (ref 136–145)
SP GR UR REFRACTOMETRY: 1.01
URINE CULTURE IF INDICATED: ABNORMAL
UROBILINOGEN UR QL STRIP.AUTO: 0.2 EU/DL (ref 0.2–1)
WBC # BLD AUTO: 5.9 K/UL (ref 3.6–11)
WBC URNS QL MICRO: ABNORMAL /HPF (ref 0–4)

## 2024-05-11 PROCEDURE — 83735 ASSAY OF MAGNESIUM: CPT

## 2024-05-11 PROCEDURE — APPNB15 APP NON BILLABLE TIME 0-15 MINS: Performed by: PHYSICIAN ASSISTANT

## 2024-05-11 PROCEDURE — 6370000000 HC RX 637 (ALT 250 FOR IP): Performed by: INTERNAL MEDICINE

## 2024-05-11 PROCEDURE — 2700000000 HC OXYGEN THERAPY PER DAY

## 2024-05-11 PROCEDURE — 81001 URINALYSIS AUTO W/SCOPE: CPT

## 2024-05-11 PROCEDURE — 73600 X-RAY EXAM OF ANKLE: CPT

## 2024-05-11 PROCEDURE — 80053 COMPREHEN METABOLIC PANEL: CPT

## 2024-05-11 PROCEDURE — 99285 EMERGENCY DEPT VISIT HI MDM: CPT

## 2024-05-11 PROCEDURE — 36415 COLL VENOUS BLD VENIPUNCTURE: CPT

## 2024-05-11 PROCEDURE — 6360000002 HC RX W HCPCS: Performed by: INTERNAL MEDICINE

## 2024-05-11 PROCEDURE — 82962 GLUCOSE BLOOD TEST: CPT

## 2024-05-11 PROCEDURE — 73610 X-RAY EXAM OF ANKLE: CPT

## 2024-05-11 PROCEDURE — 2580000003 HC RX 258: Performed by: INTERNAL MEDICINE

## 2024-05-11 PROCEDURE — 85025 COMPLETE CBC W/AUTO DIFF WBC: CPT

## 2024-05-11 PROCEDURE — 1100000003 HC PRIVATE W/ TELEMETRY

## 2024-05-11 RX ORDER — HEPARIN SODIUM 5000 [USP'U]/ML
5000 INJECTION, SOLUTION INTRAVENOUS; SUBCUTANEOUS EVERY 8 HOURS SCHEDULED
Status: DISCONTINUED | OUTPATIENT
Start: 2024-05-11 | End: 2024-05-18 | Stop reason: HOSPADM

## 2024-05-11 RX ORDER — AMLODIPINE BESYLATE 5 MG/1
5 TABLET ORAL DAILY
Status: DISCONTINUED | OUTPATIENT
Start: 2024-05-11 | End: 2024-05-18 | Stop reason: HOSPADM

## 2024-05-11 RX ORDER — PREDNISONE 20 MG/1
40 TABLET ORAL DAILY
Status: DISCONTINUED | OUTPATIENT
Start: 2024-05-11 | End: 2024-05-18 | Stop reason: HOSPADM

## 2024-05-11 RX ORDER — ONDANSETRON 4 MG/1
4 TABLET, ORALLY DISINTEGRATING ORAL EVERY 8 HOURS PRN
Status: DISCONTINUED | OUTPATIENT
Start: 2024-05-11 | End: 2024-05-18 | Stop reason: HOSPADM

## 2024-05-11 RX ORDER — ONDANSETRON 2 MG/ML
4 INJECTION INTRAMUSCULAR; INTRAVENOUS EVERY 6 HOURS PRN
Status: DISCONTINUED | OUTPATIENT
Start: 2024-05-11 | End: 2024-05-18 | Stop reason: HOSPADM

## 2024-05-11 RX ORDER — BENZONATATE 100 MG/1
100 CAPSULE ORAL 3 TIMES DAILY
Status: DISCONTINUED | OUTPATIENT
Start: 2024-05-11 | End: 2024-05-18 | Stop reason: HOSPADM

## 2024-05-11 RX ORDER — ISOSORBIDE MONONITRATE 30 MG/1
60 TABLET, EXTENDED RELEASE ORAL DAILY
Status: DISCONTINUED | OUTPATIENT
Start: 2024-05-11 | End: 2024-05-18 | Stop reason: HOSPADM

## 2024-05-11 RX ORDER — CARVEDILOL 12.5 MG/1
25 TABLET ORAL 2 TIMES DAILY WITH MEALS
Status: DISCONTINUED | OUTPATIENT
Start: 2024-05-11 | End: 2024-05-18 | Stop reason: HOSPADM

## 2024-05-11 RX ORDER — POLYETHYLENE GLYCOL 3350 17 G/17G
17 POWDER, FOR SOLUTION ORAL DAILY PRN
Status: DISCONTINUED | OUTPATIENT
Start: 2024-05-11 | End: 2024-05-18 | Stop reason: HOSPADM

## 2024-05-11 RX ORDER — ACETAMINOPHEN 650 MG/1
650 SUPPOSITORY RECTAL EVERY 6 HOURS PRN
Status: DISCONTINUED | OUTPATIENT
Start: 2024-05-11 | End: 2024-05-18 | Stop reason: HOSPADM

## 2024-05-11 RX ORDER — PREGABALIN 150 MG/1
150 CAPSULE ORAL DAILY
Status: DISCONTINUED | OUTPATIENT
Start: 2024-05-11 | End: 2024-05-16

## 2024-05-11 RX ORDER — LACTOBACILLUS RHAMNOSUS GG 10B CELL
1 CAPSULE ORAL DAILY
Status: DISCONTINUED | OUTPATIENT
Start: 2024-05-11 | End: 2024-05-18 | Stop reason: HOSPADM

## 2024-05-11 RX ORDER — PANTOPRAZOLE SODIUM 40 MG/1
40 TABLET, DELAYED RELEASE ORAL
Status: DISCONTINUED | OUTPATIENT
Start: 2024-05-12 | End: 2024-05-18 | Stop reason: HOSPADM

## 2024-05-11 RX ORDER — ASPIRIN 81 MG/1
81 TABLET, CHEWABLE ORAL DAILY
Status: DISCONTINUED | OUTPATIENT
Start: 2024-05-11 | End: 2024-05-18 | Stop reason: HOSPADM

## 2024-05-11 RX ORDER — SODIUM CHLORIDE 0.9 % (FLUSH) 0.9 %
5-40 SYRINGE (ML) INJECTION EVERY 12 HOURS SCHEDULED
Status: DISCONTINUED | OUTPATIENT
Start: 2024-05-11 | End: 2024-05-18 | Stop reason: HOSPADM

## 2024-05-11 RX ORDER — SODIUM CHLORIDE 9 MG/ML
INJECTION, SOLUTION INTRAVENOUS PRN
Status: DISCONTINUED | OUTPATIENT
Start: 2024-05-11 | End: 2024-05-18 | Stop reason: HOSPADM

## 2024-05-11 RX ORDER — SODIUM CHLORIDE 0.9 % (FLUSH) 0.9 %
5-40 SYRINGE (ML) INJECTION PRN
Status: DISCONTINUED | OUTPATIENT
Start: 2024-05-11 | End: 2024-05-18 | Stop reason: HOSPADM

## 2024-05-11 RX ORDER — ACETAMINOPHEN 325 MG/1
650 TABLET ORAL EVERY 6 HOURS PRN
Status: DISCONTINUED | OUTPATIENT
Start: 2024-05-11 | End: 2024-05-18 | Stop reason: HOSPADM

## 2024-05-11 RX ORDER — DOXYCYCLINE HYCLATE 100 MG
100 TABLET ORAL 2 TIMES DAILY
Status: DISCONTINUED | OUTPATIENT
Start: 2024-05-11 | End: 2024-05-18 | Stop reason: HOSPADM

## 2024-05-11 RX ADMIN — HEPARIN SODIUM 5000 UNITS: 5000 INJECTION INTRAVENOUS; SUBCUTANEOUS at 16:19

## 2024-05-11 RX ADMIN — DOXYCYCLINE HYCLATE 100 MG: 100 TABLET, COATED ORAL at 16:19

## 2024-05-11 RX ADMIN — DOXYCYCLINE HYCLATE 100 MG: 100 TABLET, COATED ORAL at 20:30

## 2024-05-11 RX ADMIN — BENZONATATE 100 MG: 100 CAPSULE ORAL at 20:30

## 2024-05-11 RX ADMIN — CARVEDILOL 25 MG: 12.5 TABLET, FILM COATED ORAL at 18:20

## 2024-05-11 RX ADMIN — BENZONATATE 100 MG: 100 CAPSULE ORAL at 16:19

## 2024-05-11 RX ADMIN — SODIUM CHLORIDE, PRESERVATIVE FREE 10 ML: 5 INJECTION INTRAVENOUS at 20:30

## 2024-05-11 ASSESSMENT — PAIN DESCRIPTION - LOCATION: LOCATION: ANKLE

## 2024-05-11 ASSESSMENT — PAIN - FUNCTIONAL ASSESSMENT: PAIN_FUNCTIONAL_ASSESSMENT: 0-10

## 2024-05-11 ASSESSMENT — PAIN DESCRIPTION - ORIENTATION: ORIENTATION: LEFT

## 2024-05-11 ASSESSMENT — PAIN SCALES - GENERAL: PAINLEVEL_OUTOF10: 3

## 2024-05-11 NOTE — H&P
Hospitalist Admission Note    NAME:   Marilee Oakes   : 1950   MRN: [id number]     Date/Time: 2024 2:25 PM    Patient PCP: Mariela Marino MD nephrology= Dr. Evangelista    ______________________________________________________________________  Given the patient's current clinical presentation, I have a high level of concern for decompensation if discharged from the emergency department.  Complex decision making was performed, which includes reviewing the patient's available past medical records, laboratory results, and x-ray films.       My assessment of this patient's clinical condition and my plan of care is as follows.    Assessment / Plan:    Recent MRSA wound infection POA  Left ankle fracture s/p ORIF status post MRSA wound infection status post hardware removal status post Zyvox discharged on p.o. doxycycline per ID recommendations yesterday.  Patient went home with home health despite SNF recommendations  Return back to ED last night and then again today morning with complaints of left ankle pain with feeling of something popped like a stitch in the ankle  History of noncompliance with nonweightbearing recommendation by orthopedic on the L ankle post op    Admit to orthopedic floor bed  Inpatient PT OT eval for DC planning-if recommended last admission  Inpatient case management consult for SNF arrangement  Inpatient Ortho consult for evaluation of the left ankle sutures/wound  Last admission Ortho recommended Continue PT/OT NWB LLE in boot  Further surgical plans for left ankle will be established in the future once infection is cleared -likely with podiatrist.  Ortho was okay to remove boot for daily dressing changes. Otherwise, must remain in boot. -Stitches were supposed to stay until 3 weeks post op .  S/p ceftaroline, s/p  IV Zyvox, will resume doxycycline prescribed on discharge yesterday  Outpatient ID follow-up on  already in place  Antimicrobial orders for  appendage is completely occluded. A Watchman left atrial appendage occluder is present and completely occludes the appendage.    History of migraine     Hypercholesterolemia 01/22/2018    Irritable bowel syndrome     IBS, spinal stenosis    Long-term use of high-risk medication 01/22/2018    Lumbar spinal stenosis     Morbid (severe) obesity due to excess calories (HCC)     Neuropathy     Obesity (BMI 30-39.9) 04/30/2019    Obstructive sleep apnea     uses CPAP    Permanent atrial fibrillation (HCC)     Presence of implantable cardioverter-defibrillator (ICD)     Presence of Watchman left atrial appendage closure device     Type 2 diabetes mellitus with diabetic neuropathy, without long-term current use of insulin (HCC) 06/05/2016    Venous insufficiency     Vitamin B12 deficiency         Past Surgical History:   Procedure Laterality Date    ANKLE FRACTURE SURGERY Left 3/23/2024    LEFT ANKLE OPEN REDUCTION INTERNAL FIXATION performed by Edgardo Farnsworth DO at [facility] MAIN OR    APPENDECTOMY      BREAST BIOPSY Left     about 4-5 years ago from 2022, neg    CHOLECYSTECTOMY  11/15/2018    COLONOSCOPY N/A 3/14/2018    COLONOSCOPY performed by Pepito Quintero MD at [facility] ENDOSCOPY    HYSTERECTOMY (CERVIX STATUS UNKNOWN)      IR KYPHOPLASTY LUMBAR FIRST LEVEL  12/22/2020    IR KYPHOPLASTY LUMBAR FIRST LEVEL 12/22/2020 [facility] RAD ANGIO IR    IR KYPHOPLASTY LUMBAR FIRST LEVEL  12/22/2020    IR KYPHOPLASTY THORACIC FIRST LEVEL  1/6/2021    IR KYPHOPLASTY THORACIC FIRST LEVEL 1/6/2021 [facility] RAD ANGIO IR    IR KYPHOPLASTY THORACIC FIRST LEVEL  1/6/2021    IR NONTUNNELED VASCULAR CATHETER  5/2/2024    IR NONTUNNELED VASCULAR CATHETER 5/2/2024 Jeremy Oakes Jr., APRN - NP [facility] RAD ANGIO IR    IR TUNNELED CATHETER PLACEMENT GREATER THAN 5 YEARS  5/9/2024    IR TUNNELED CATHETER PLACEMENT GREATER THAN 5 YEARS 5/9/2024 Claudette Cochran, APRSAMANTHA - NP [facility] RAD ANGIO IR    LEG SURGERY Left 4/30/2024    ANKLE INCISION AND DRAINAGE WITH HARDWARE

## 2024-05-11 NOTE — ED PROVIDER NOTES
2:45 PM    Chief Complaint   Patient presents with    Ankle Pain     Pt BIBEMS from home reporting 6/10 left ankle pain- was seen here last night for stitches, placed weight on foot and states she felt a stitch might have popped. EMS reports scant bleeding. EMS also noted mild wheezing upon arrival and gave pt breathing treatment en route. Wheezing resolved on arrival.       The patient presents with: Left ankle pain.  Patient has a complex past medical history including previous left ankle fracture and ORIF complicated by hardware infection necessitating prolonged antibiotics, CHF, CAD, CKD on hemodialysis, chronic respiratory failure on 2 L was discharged from the hospital yesterday.    At hospital discharge, physical therapy recommended discharge to SNF.  Family elected for discharge home at that time.  This is patient's second ED visit since discharge.  Initially patient seen after bearing weight on ankle and rupturing sutures.  Patient then transported back home and attempted to ambulate again.  Patient's  concerned as he cannot care for her.    My exam shows: 73-year-old female, appears chronically ill, resting in bed, no acute distress.  Patient is on 2 L nasal cannula, occasional cough, but no respiratory distress.  She is pale.  She has a right CVC in place for dialysis access with mild ecchymosis around it.  Patient's left ankle is dressed with sutures in place, no bleeding, purulent drainage or extracted.    Impression/Plan: In brief this is a 73-year-old female who presents with left ankle pain.  Vital signs are within normal limits.  Appears to be unfortunate situation where family attempted to take patient home to care for her but are unable to.  Will obtain screening labs for admission.  Will obtain plain film and replace boot.  Patient will need to be hospitalized for PT evaluation and possible placement.  Patient and family agreeable to plan.    Patient discussed with son due to social  determinants of health.    Nursing note and vitals reviewed.    MEDICATIONS GIVEN:  Medications   amLODIPine (NORVASC) tablet 5 mg (has no administration in time range)   aspirin chewable tablet 81 mg (has no administration in time range)   benzonatate (TESSALON) capsule 100 mg (has no administration in time range)   mometasone-formoterol (DULERA) 200-5 MCG/ACT inhaler 2 puff (has no administration in time range)   carvedilol (COREG) tablet 25 mg (has no administration in time range)   doxycycline hyclate (VIBRA-TABS) tablet 100 mg (has no administration in time range)   predniSONE (DELTASONE) tablet 40 mg (has no administration in time range)   insulin NPH (HumuLIN N;NovoLIN N) injection vial 8 Units (has no administration in time range)   isosorbide mononitrate (IMDUR) extended release tablet 60 mg (has no administration in time range)   acidophilus capsule 1 capsule (has no administration in time range)   pantoprazole (PROTONIX) tablet 40 mg (has no administration in time range)   pregabalin (LYRICA) capsule 150 mg (has no administration in time range)   sodium chloride (OCEAN) 0.65 % nasal spray 2 spray (has no administration in time range)   sodium chloride flush 0.9 % injection 5-40 mL (has no administration in time range)   sodium chloride flush 0.9 % injection 5-40 mL (has no administration in time range)   0.9 % sodium chloride infusion (has no administration in time range)   ondansetron (ZOFRAN-ODT) disintegrating tablet 4 mg (has no administration in time range)     Or   ondansetron (ZOFRAN) injection 4 mg (has no administration in time range)   polyethylene glycol (GLYCOLAX) packet 17 g (has no administration in time range)   acetaminophen (TYLENOL) tablet 650 mg (has no administration in time range)     Or   acetaminophen (TYLENOL) suppository 650 mg (has no administration in time range)   heparin (porcine) injection 5,000 Units (has no administration in time range)       I personally saw and examined

## 2024-05-11 NOTE — ED PROVIDER NOTES
[facility] EMERGENCY DEPT  EMERGENCY DEPARTMENT ENCOUNTER       Pt Name: Marilee Oakes  MRN: [id number]  Birthdate 1950  Date of evaluation: 5/11/2024  Provider: Giovani Rodrigues MD   PCP: Mariela Marino MD  Note Started: 2:06 PM EDT 5/11/24     CHIEF COMPLAINT       Chief Complaint   Patient presents with    Ankle Pain     Pt BIBEMS from home reporting 6/10 left ankle pain- was seen here last night for stitches, placed weight on foot and states she felt a stitch might have popped. EMS reports scant bleeding. EMS also noted mild wheezing upon arrival and gave pt breathing treatment en route. Wheezing resolved on arrival.        HISTORY OF PRESENT ILLNESS: 1 or more elements      History From: Patient, , History limited by: Patient's underlying dementia none     Marilee Oakes is a 73 y.o. female with a notable past history of left bimalleolar fracture status post ORIF, subsequently developing MRSA infection in the wound requiring inpatient admission and [facility].  She returned to the hospital for worsening mental status, worsening infection, and was subsequently discharged with p.o. doxycycline on 5/10/2024.  She return to the emergency department yesterday evening with concerns for wound dehiscence and bleeding from around the sutures, was discharged home.  She returns today with her  who has concerns for his ability to care for her at home.  She is supposed to be nonweightbearing left lower extremity however has been bearing weight on it in order to get to the bathroom.  She states that there is some slight drainage in the lateral incision, sutures appear to be intact without wound dehiscence.       Please See MDM for Additional Details of the HPI/PMH  Nursing Notes were all reviewed and agreed with or any disagreements were addressed in the HPI.     REVIEW OF SYSTEMS        Positives and Pertinent negatives as per HPI.    PAST HISTORY     Past Medical History:  Past Medical

## 2024-05-11 NOTE — CONSULTS
DJD 02/22/2013    Ataxia 02/22/2013     Family History   Problem Relation Age of Onset    Hypertension Mother     COPD Child     COPD Brother     Hypertension Brother     High Cholesterol Brother     COPD Sister     Thyroid Disease Sister     Hypertension Sister     High Cholesterol Sister     Alcohol Abuse Father     Heart Disease Mother     Crohn's Disease Mother     Osteoarthritis Mother     Inflam Bowel Dis Child     High Cholesterol Mother       Social History     Tobacco Use    Smoking status: Never    Smokeless tobacco: Never   Substance Use Topics    Alcohol use: No     Past Medical History:   Diagnosis Date    Age-related osteoporosis without current pathological fracture     Anxiety and depression 01/22/2018    Arthritis     OA    Asthma     CAD (coronary artery disease)     Chronic systolic heart failure (HCC)     CKD stage G3b/A1, GFR 30-44 and albumin creatinine ratio <30 mg/g (HCC)     Essential hypertension     GERD (gastroesophageal reflux disease)     History of diverticulitis     diverticulitis    History of echocardiogram 11/2021    LV: Estimated LVEF is 40 - 45%. Visually measured ejection fraction. Normal cavity size and wall thickness. Globally reduced systolic function.  LA: Moderately dilated left atrium. Left atrial appendage is completely occluded. A Watchman left atrial appendage occluder is present and completely occludes the appendage.    History of migraine     Hypercholesterolemia 01/22/2018    Irritable bowel syndrome     IBS, spinal stenosis    Long-term use of high-risk medication 01/22/2018    Lumbar spinal stenosis     Morbid (severe) obesity due to excess calories (HCC)     Neuropathy     Obesity (BMI 30-39.9) 04/30/2019    Obstructive sleep apnea     uses CPAP    Permanent atrial fibrillation (HCC)     Presence of implantable cardioverter-defibrillator (ICD)     Presence of Watchman left atrial appendage closure device     Type 2 diabetes mellitus with diabetic neuropathy,  (COREG) 25 MG tablet TAKE 1 TABLET TWICE DAILY WITH MEALS    triamcinolone (KENALOG) 0.1 % cream APPLY A THIN FILM OF CREAM EXTERNALLY TO THE AFFECTED AREA TWICE DAILY    acetaminophen (TYLENOL) 500 MG tablet Take 1 tablet by mouth every 6 hours as needed    albuterol sulfate HFA (PROVENTIL;VENTOLIN;PROAIR) 108 (90 Base) MCG/ACT inhaler Inhale 1 puff into the lungs every 4 hours as needed    vitamin D 25 MCG (1000 UT) CAPS Take by mouth daily    clotrimazole-betamethasone (LOTRISONE) 1-0.05 % cream Apply topically 2 times daily    magnesium oxide (MAG-OX) 400 MG tablet Take 1 tablet by mouth 2 times daily    sodium chloride (OCEAN) 0.65 % nasal spray 2 sprays by Nasal route every 8 hours as needed      Allergies   Allergen Reactions    Sulfa Antibiotics Swelling    Daptomycin Rash    Amoxicillin Swelling        Review of Systems:  A comprehensive review of systems was negative except for that written in the HPI.    Mental Status: some intermittent confusion    Objective:     Patient Vitals for the past 8 hrs:   BP Temp Temp src Pulse Resp SpO2   24 1441 (!) 100/53 98 °F (36.7 °C) Oral 85 20 98 %   24 1200 (!) 149/89 -- -- 80 -- 92 %   24 1106 139/74 97.9 °F (36.6 °C) Oral 82 19 99 %     Temp (24hrs), Av.9 °F (36.6 °C), Min:97.8 °F (36.6 °C), Max:98 °F (36.7 °C)      Gen: Well-developed,  in no acute distress drifting off to sleep.  Appears weak and debilitated   HEENT: Pink conjunctivae, hearing intact to voice, moist mucous membranes   Neck: Supple  Resp: No respiratory distress nasal o2. Wet cough  Card: irreg, cap refill less than 3 sec in distal digits   Abd:  non-distended  Musc: intact sutures, thin bloody drainage from inferior aspect of lateral incision. No erythema.  No calf ttp,   Intact EHL/FHL/knee ext  Skin:  Skin warm, pink, dry  Neuro: Cranial nerves are grossly intact, follows commands appropriately   Psych: Fair insight, oriented to person, place, alert             Imaging

## 2024-05-11 NOTE — DISCHARGE INSTRUCTIONS
It was a pleasure taking care of you at [facility] Emergency Department today.  We know that when you come to [facility], you are entrusting us with your health, comfort, and safety.  Our physicians and nurses honor that trust, and we truly appreciate the opportunity to care for you and your loved ones.      We also value your feedback.  If you receive a survey about your Emergency Department experience today, please fill it out.  We care about our patients' feedback, and we listen to what you have to say.  Thank you!

## 2024-05-12 LAB
GLUCOSE BLD STRIP.AUTO-MCNC: 125 MG/DL (ref 65–117)
GLUCOSE BLD STRIP.AUTO-MCNC: 286 MG/DL (ref 65–117)
GLUCOSE BLD STRIP.AUTO-MCNC: 338 MG/DL (ref 65–117)
GLUCOSE BLD STRIP.AUTO-MCNC: 357 MG/DL (ref 65–117)
GLUCOSE BLD STRIP.AUTO-MCNC: 362 MG/DL (ref 65–117)
SERVICE CMNT-IMP: ABNORMAL

## 2024-05-12 PROCEDURE — APPNB30 APP NON BILLABLE TIME 0-30 MINS: Performed by: ORTHOPAEDIC SURGERY

## 2024-05-12 PROCEDURE — 94640 AIRWAY INHALATION TREATMENT: CPT

## 2024-05-12 PROCEDURE — 6370000000 HC RX 637 (ALT 250 FOR IP): Performed by: NURSE PRACTITIONER

## 2024-05-12 PROCEDURE — 2700000000 HC OXYGEN THERAPY PER DAY

## 2024-05-12 PROCEDURE — 6360000002 HC RX W HCPCS: Performed by: INTERNAL MEDICINE

## 2024-05-12 PROCEDURE — 97165 OT EVAL LOW COMPLEX 30 MIN: CPT

## 2024-05-12 PROCEDURE — 6370000000 HC RX 637 (ALT 250 FOR IP): Performed by: INTERNAL MEDICINE

## 2024-05-12 PROCEDURE — 97535 SELF CARE MNGMENT TRAINING: CPT

## 2024-05-12 PROCEDURE — 94761 N-INVAS EAR/PLS OXIMETRY MLT: CPT

## 2024-05-12 PROCEDURE — 97530 THERAPEUTIC ACTIVITIES: CPT

## 2024-05-12 PROCEDURE — 2580000003 HC RX 258: Performed by: INTERNAL MEDICINE

## 2024-05-12 PROCEDURE — 1100000003 HC PRIVATE W/ TELEMETRY

## 2024-05-12 PROCEDURE — 97110 THERAPEUTIC EXERCISES: CPT

## 2024-05-12 PROCEDURE — 97161 PT EVAL LOW COMPLEX 20 MIN: CPT

## 2024-05-12 PROCEDURE — 82962 GLUCOSE BLOOD TEST: CPT

## 2024-05-12 RX ORDER — HYDROXYZINE HYDROCHLORIDE 25 MG/1
25 TABLET, FILM COATED ORAL 3 TIMES DAILY PRN
Status: DISCONTINUED | OUTPATIENT
Start: 2024-05-12 | End: 2024-05-18 | Stop reason: HOSPADM

## 2024-05-12 RX ORDER — HYDROXYZINE HYDROCHLORIDE 25 MG/1
25 TABLET, FILM COATED ORAL ONCE
Status: COMPLETED | OUTPATIENT
Start: 2024-05-12 | End: 2024-05-12

## 2024-05-12 RX ORDER — LORAZEPAM 2 MG/ML
0.5 INJECTION INTRAMUSCULAR ONCE
Status: COMPLETED | OUTPATIENT
Start: 2024-05-12 | End: 2024-05-12

## 2024-05-12 RX ORDER — HALOPERIDOL 5 MG/ML
2 INJECTION INTRAMUSCULAR EVERY 6 HOURS PRN
Status: DISCONTINUED | OUTPATIENT
Start: 2024-05-12 | End: 2024-05-15

## 2024-05-12 RX ADMIN — HEPARIN SODIUM 5000 UNITS: 5000 INJECTION INTRAVENOUS; SUBCUTANEOUS at 08:42

## 2024-05-12 RX ADMIN — CARVEDILOL 25 MG: 12.5 TABLET, FILM COATED ORAL at 08:42

## 2024-05-12 RX ADMIN — BENZONATATE 100 MG: 100 CAPSULE ORAL at 08:42

## 2024-05-12 RX ADMIN — ASPIRIN 81 MG: 81 TABLET, CHEWABLE ORAL at 08:43

## 2024-05-12 RX ADMIN — CARVEDILOL 25 MG: 12.5 TABLET, FILM COATED ORAL at 16:56

## 2024-05-12 RX ADMIN — HEPARIN SODIUM 5000 UNITS: 5000 INJECTION INTRAVENOUS; SUBCUTANEOUS at 15:13

## 2024-05-12 RX ADMIN — HALOPERIDOL LACTATE 2 MG: 5 INJECTION, SOLUTION INTRAMUSCULAR at 18:36

## 2024-05-12 RX ADMIN — ISOSORBIDE MONONITRATE 60 MG: 30 TABLET, EXTENDED RELEASE ORAL at 08:42

## 2024-05-12 RX ADMIN — PREDNISONE 40 MG: 20 TABLET ORAL at 08:42

## 2024-05-12 RX ADMIN — PREGABALIN 150 MG: 150 CAPSULE ORAL at 08:42

## 2024-05-12 RX ADMIN — INSULIN HUMAN 8 UNITS: 100 INJECTION, SUSPENSION SUBCUTANEOUS at 08:42

## 2024-05-12 RX ADMIN — HEPARIN SODIUM 5000 UNITS: 5000 INJECTION INTRAVENOUS; SUBCUTANEOUS at 00:45

## 2024-05-12 RX ADMIN — BENZONATATE 100 MG: 100 CAPSULE ORAL at 15:14

## 2024-05-12 RX ADMIN — ARFORMOTEROL TARTRATE: 15 SOLUTION RESPIRATORY (INHALATION) at 08:03

## 2024-05-12 RX ADMIN — ACETAMINOPHEN 650 MG: 325 TABLET ORAL at 00:44

## 2024-05-12 RX ADMIN — BENZONATATE 100 MG: 100 CAPSULE ORAL at 20:15

## 2024-05-12 RX ADMIN — Medication 1 CAPSULE: at 08:42

## 2024-05-12 RX ADMIN — AMLODIPINE BESYLATE 5 MG: 5 TABLET ORAL at 08:43

## 2024-05-12 RX ADMIN — LORAZEPAM 0.5 MG: 2 INJECTION INTRAMUSCULAR; INTRAVENOUS at 20:15

## 2024-05-12 RX ADMIN — HYDROXYZINE HYDROCHLORIDE 25 MG: 25 TABLET, FILM COATED ORAL at 00:45

## 2024-05-12 RX ADMIN — SODIUM CHLORIDE, PRESERVATIVE FREE 10 ML: 5 INJECTION INTRAVENOUS at 20:54

## 2024-05-12 RX ADMIN — DOXYCYCLINE HYCLATE 100 MG: 100 TABLET, COATED ORAL at 08:43

## 2024-05-12 RX ADMIN — DOXYCYCLINE HYCLATE 100 MG: 100 TABLET, COATED ORAL at 20:15

## 2024-05-12 RX ADMIN — HALOPERIDOL LACTATE 2 MG: 5 INJECTION, SOLUTION INTRAMUSCULAR at 23:02

## 2024-05-12 RX ADMIN — ACETAMINOPHEN 650 MG: 325 TABLET ORAL at 19:06

## 2024-05-12 ASSESSMENT — PAIN DESCRIPTION - LOCATION: LOCATION: ANKLE

## 2024-05-12 ASSESSMENT — PAIN SCALES - GENERAL
PAINLEVEL_OUTOF10: 10
PAINLEVEL_OUTOF10: 3

## 2024-05-12 ASSESSMENT — PAIN DESCRIPTION - ORIENTATION: ORIENTATION: LEFT

## 2024-05-12 ASSESSMENT — PAIN - FUNCTIONAL ASSESSMENT: PAIN_FUNCTIONAL_ASSESSMENT: PREVENTS OR INTERFERES SOME ACTIVE ACTIVITIES AND ADLS

## 2024-05-12 ASSESSMENT — PAIN DESCRIPTION - DESCRIPTORS: DESCRIPTORS: ACHING

## 2024-05-12 NOTE — PLAN OF CARE
Problem: Safety - Adult  Goal: Free from fall injury  5/12/2024 1423 by Araceli Maki LPN  Outcome: Progressing  Flowsheets (Taken 5/12/2024 0737)  Free From Fall Injury: Instruct family/caregiver on patient safety  5/12/2024 0051 by Jenny Mock RN  Outcome: Progressing     Problem: Chronic Conditions and Co-morbidities  Goal: Patient's chronic conditions and co-morbidity symptoms are monitored and maintained or improved  5/12/2024 1423 by Araceli Maki LPN  Outcome: Progressing  5/12/2024 0051 by Jenny Mock RN  Outcome: Progressing     Problem: Respiratory - Adult  Goal: Achieves optimal ventilation and oxygenation  5/12/2024 1423 by Araceli Maki LPN  Outcome: Progressing  5/12/2024 0835 by Naheed Kelley, RT  Outcome: Progressing  Flowsheets (Taken 5/12/2024 0737 by Araceli Maki LPN)  Achieves optimal ventilation and oxygenation:   Assess for changes in respiratory status   Assess for changes in mentation and behavior   Position to facilitate oxygenation and minimize respiratory effort   Oxygen supplementation based on oxygen saturation or arterial blood gases   Encourage broncho-pulmonary hygiene including cough, deep breathe, incentive spirometry   Assess the need for suctioning and aspirate as needed   Assess and instruct to report shortness of breath or any respiratory difficulty   Respiratory therapy support as indicated  5/12/2024 0051 by Jenny Mock RN  Outcome: Progressing

## 2024-05-12 NOTE — PROGRESS NOTES
Hospitalist Progress Note    NAME:   Marilee Oakes   : 1950   MRN: [id number]     Date: 2024    Patient PCP: Mariela Marino MD    Hospital Problem list:     Recent MRSA wound infection POA  Left ankle fracture s/p ORIF 2024  MRSA bacteremia with new fevers 4/3/2024 - 2024 discharged on daptomycin  MRSA left ankle infection s/p hardware removal -5/10/2024 discharged on PO doxycycline  Family unable to care for patient at home, SNF recommended last admit  Patient went home with home health despite SNF recommendations  Return back to ED last night and then again today morning with complaints of left ankle pain   Feeling of something popped like a stitch in the ankle  Inpatient PT OT eval for DC planning-if recommended last admission  Inpatient case management consult for SNF arrangement  Inpatient Ortho consult for evaluation of the left ankle sutures/wound  Last admission Ortho recommended   Continue PT/OT with NWB LLE in boot  Further surgical plans for left ankle will be established in the future once infection is cleared   Ortho was okay to remove boot for daily dressing changes.   Otherwise, must remain in boot.  Stitches were supposed to stay until 3 weeks post op .  Doxycycline  Orthopedics saw    Acute metabolic encephalopathy today  Acute delirium  Agitated and confused this evening  Similar confusion documented last admit, ? Hospital delirium  CT head negative last admit  Reduced lyrica from bid to qhs last admit  PRN haldol and hydroxyzine    Chronic diastolic CHF with ICD POA LVEF 55-60%  CAD POA  A-fib with Watchman device POA  Continue with aspirin, Imdur, Coreg.  BNP elevated, coarse lung sounds  CPAP overnight     GERD  -Continue with PPI     Acute kidney injury POA since  admit creat 2.59 --> 4.64, ? ATN  CKD 3 POA baseline creatinine between 1.2-1.5 in early 2024  Hemodialysis initiated last admission, Discharged with a [facility]   Nephrology

## 2024-05-12 NOTE — PROGRESS NOTES
End of Shift Note    Bedside shift change report given to Araceli (oncoming nurse) by Jenny Mock RN (offgoing nurse).  Report included the following information SBAR    Shift worked:  7pm-7am     Shift summary and any significant changes:     Patient confused and agitated all night, vital signs stable night shift MD notified. Unable to obtain blood draw due to patient withdrawing her arms screaming \"jarad please stop it.\" Patient had 6 bowel movements that were soft and seedy.     Concerns for physician to address:  Confusion, agitation, and disorientation at night     Zone phone for oncoming shift:   [phone]       Activity:     Number times ambulated in hallways past shift: 0  Number of times OOB to chair past shift: 0    Cardiac:   Cardiac Monitoring: No           Access:  Current line(s): PIV     Genitourinary:   Urinary status: incontinent and external catheter    Respiratory:      Chronic home O2 use?: YES  Incentive spirometer at bedside: YES       GI:     Current diet:  ADULT DIET; Regular; 4 carb choices (60 gm/meal); Low Fat/Low Chol/High Fiber/2 gm Na  Passing flatus: YES   Tolerating current diet: YES       Pain Management:   Patient states pain is manageable on current regimen: YES    Skin:     Interventions: float heels and internal/external urinary devices    Patient Safety:  Fall Score:    Interventions: bed/chair alarm and gripper socks       Length of Stay:  Expected LOS: 2  Actual LOS: 1      Jenyn Mock RN

## 2024-05-12 NOTE — PLAN OF CARE
Problem: Physical Therapy - Adult  Goal: By Discharge: Performs mobility at highest level of function for planned discharge setting.  See evaluation for individualized goals.  Description: FUNCTIONAL STATUS PRIOR TO ADMISSION: pt discharged from rehab transferring to/from chair via slide board with S, pt had not been performing sit <> stand transfers     HOME SUPPORT PRIOR TO ADMISSION: The patient lived with .    Physical Therapy Goals  Initiated 5/12/2024  1.  Patient will move from supine to sit and sit to supine, scoot up and down, and roll side to side in bed with independence within 7 day(s).    2.  Patient will transfer from bed to chair and chair to bed with modified independence using the least restrictive device within 7 day(s).  4.  Patient will be independent with home exercise program within 7 day(s).   Outcome: Progressing     PHYSICAL THERAPY EVALUATION    Patient: Marilee Oakes (73 y.o. female)  Date: 5/12/2024  Primary Diagnosis: Drainage from wound [T14.8XXA]  Infection of postoperative wound due to methicillin-resistant Staphylococcus aureus [T81.49XA, B95.62]  Unable to care for self [Z78.9]       Precautions: Restrictions/Precautions: Bed Alarm, Fall Risk (LLE NWB)  Required Braces or Orthoses?: Yes (LLE CAM boot at all times) Required Braces or Orthoses?: Yes (LLE CAM boot at all times) Lower Extremity Weight Bearing Restrictions  Left Lower Extremity Weight Bearing: Non Weight Bearing  Partial Weight Bearing Percentage Or Pounds: with boot                  ASSESSMENT :   Pt seen for PT evaluation following readmission for bleeding at L ankle surgical site (ORIF 3/23) and dialysis port. Pt demos anti-gravity strength of BLE, transferred supine > sit with SBA. Pt performed lateral scooting toward HOB with CGA initially, progressing to Min A 2/2 fatigue. Pt required seated rest breaks and cues for PLB for energy conservation.  Pt returned sit > supine with min A. Pt left in chair

## 2024-05-12 NOTE — PROGRESS NOTES
RENAL  PROGRESS NOTE        Subjective:    Patient was dc on Friday and back on saturday  Left lower ext bleeding  Objective:   VITALS SIGNS:    BP (!) 142/83   Pulse 80   Temp 97.9 °F (36.6 °C)   Resp 18   SpO2 99%             Temp (24hrs), Av.8 °F (36.6 °C), Min:97.5 °F (36.4 °C), Max:98.1 °F (36.7 °C)         PHYSICAL EXAM:      DATA REVIEW:     INTAKE / OUTPUT:   Last shift:      No intake/output data recorded.  Last 3 shifts: No intake/output data recorded.  No intake or output data in the 24 hours ending 24 0830      LABS:   LABS:  Recent Labs     24  1240 05/10/24  0309 24  0020 24  0401    138 136 138   K 3.7 4.2 4.1 4.6    105 104 106   CO2 32 28 29 29   BUN 41* 47* 32* 61*   CREATININE 2.42* 2.67* 1.89* 2.91*   CALCIUM 8.3* 8.5 7.8* 8.5   PHOS  --  4.2 3.2 4.8*   MG 1.7 1.9 1.9 1.6     Recent Labs     24  1240 05/10/24  0309 24  0020   WBC 5.9 4.8 5.1   HGB 7.8* 7.7* 7.7*   HCT 27.1* 26.5* 26.5*   PLT 84* 84* 81*     No results for input(s): \"KU\", \"CLU\", \"CREAU\" in the last 720 hours.    Invalid input(s): \"ALEJANDRA\", \"PROU\"                Assessment   :                                               Plan:  HILARY on CKD stage 3b  Anemia  COPD  CHF with AICD  Afib w/ watchman  Left ankle fracture with infection  Recurrent uti HD initiated , HD MWF for now - short HD today; ATN; Upc ratio 2.2; No hydro     added amlodipine 5 mg daily on      Hgb < 10; continue retacrit     perm cath  and   outpatient HD at [facility]       HD in AM  Discussed with her and family

## 2024-05-12 NOTE — PROGRESS NOTES
Nursing contacted Nocturnist/cross cover provider via non-urgent messaging system [employer] and notified patient having severe anxiety, states can't relax, asking form something for anxiety. No other concerns reported. No acute distress reported. No other information provided by nurse, ALISIA. Ordered atarax 25mg po x1. Will defer further evaluation/management to the day shift primary attending care team. Patient denies any further complaints or concerns. Nursing to notify Hospitalist for further/continued concerns. Will remain available overnight for further concerns if nursing/patient needs. Please note, there are RRT systems in this hospital in place that if nursing has acute or critical patient condition change or concern, this is to help facilitate and notify that patient needs immediate bedside evaluation by a provider.     Non-billable note.

## 2024-05-12 NOTE — PLAN OF CARE
Problem: Safety - Adult  Goal: Free from fall injury  Outcome: Progressing     Problem: Chronic Conditions and Co-morbidities  Goal: Patient's chronic conditions and co-morbidity symptoms are monitored and maintained or improved  Outcome: Progressing     Problem: Respiratory - Adult  Goal: Achieves optimal ventilation and oxygenation  5/12/2024 0051 by Jenny Mock, RN  Outcome: Progressing  5/11/2024 1927 by Danni Webber, RT  Outcome: Progressing

## 2024-05-12 NOTE — PROGRESS NOTES
1825 pt was attempting to climb out of bed, grabbing at staff members, attempting to remove Hemodialysis port, yelling about needing to leave. Called attending for medication. One time dose for IM Haldol placed. Code PRASHANT was called since pt was unable to be re-orientated. Security arrived and attempted to call pt down. IM Haldol given. Pt continues to yell and attempt to climb out of bed and grab staff members. Attending and Nursing supervisor called. Pt is beginning to calm down but still yells and throws legs over side rails. Attending placed order for soft restraints, sitter, and one time dose of IV Ativan. Weighted Bacova offered, however pt is refusing. Weighted blanket at bedside, sitter aware.

## 2024-05-12 NOTE — PROGRESS NOTES
Patient confused and extremely agitated, continues to yell \"please jarad stop it!\" Attempts to reorient the patient to time and place was not successful and patient refused to stay still for blood draw.

## 2024-05-12 NOTE — PROGRESS NOTES
ORTHO - Progress Note    Marilee Oakes     [id number]  female    73 y.o.    1950    Admit date:2024        SUBJECTIVE:     Marilee Oakes is a 73 y.o. female resting in the bed.  Patient has complaints of appropriate post-op pain.   Patients  at bedside.   Denies F/C, nausea, vomiting, dizziness, lightheadedness, chest pain, or shortness of breath.    OBJECTIVE:       Physical Exam:  General: alert, cooperative, no distress.   Gastrointestinal:  non-distended .    Cardiovascular: equal pulses in the lower extremities,  Brisk cap refill in all distal extremities   Genitourinary:  Purewick  Respiratory: No respiratory distress   Neurological:Neurovascular exam within normal limits.     Senstion intact: LE bilat.    Motor: + DF/PF/EHL.   Musculoskeletal: Phillip's sign negative in R lower extremities.  Calves soft, supple, non-tender upon palpation or with passive stretch.  No sign of DVT   Dressing/Wound:  Boot LLE; Serous drainage on dressing of medial incision and bloody drainage of lateral incision. No active drainage. No dehiscence. No erythema   Vital Signs:       Patient Vitals for the past 8 hrs:   BP Temp Pulse Resp SpO2 Height Weight   24 1053 -- -- -- -- -- 1.753 m (5' 9\") 108.9 kg (240 lb)   24 0803 -- -- 80 18 99 % -- --   24 0737 (!) 142/83 97.9 °F (36.6 °C) 80 16 100 % -- --   24 0331 125/85 97.5 °F (36.4 °C) 80 16 100 % -- --                                          Temp (24hrs), Av.8 °F (36.6 °C), Min:97.5 °F (36.4 °C), Max:98.1 °F (36.7 °C)      Labs:        Recent Labs     24  1240   HCT 27.1*   HGB 7.8*     PT/OT:              ASSESSMENT / PLAN:   Principal Problem:    Unable to care for self  Resolved Problems:    * No resolved hospital problems. *     - Dressings changed today.  -  Continue PT/OT NWB LLE  - Boot on LLE unless changing dressings.   -  DVT prophylaxis- SCD w/ Heparin  - Continue Abx per ID/ Medical service  - Patient will need

## 2024-05-12 NOTE — CARE COORDINATION
Care Management Initial Assessment       RUR: 34% HIGH  Readmission? Yes - 4/25-5/10/24 at [facility] for HILARY  1st IM letter given? Yes - 5/11/24 by Patient Access  1st  letter given: No      Initial note: Chart review completed prior to assessment. CM completed assessment with pt's spouse, Wesley Oakes. CM introduced self, role of CM, verified demographics to ensure accuracy, and discussed transition of care planning. CM is familiar with pt/spouse from previous admissions. Pt returned home with [facility] per request of pt's spouse; per last therapy note, recommendations were for SNF. Pt has recent hx of IPR at [facility], at last admission pt's spouse did not wish for pt to return there. Pt has the following DME at home: hospital bed, shower chair, rollator, home oxygen ([employer] wheelchair, and sliding board.     Admission documentation and reports from pt's spouse are inconsistent - spouse is reporting that pt did not attempt to put weight on non weight bearing LLE. He reports that pt remained in her recliner as recommended during her time at home. He reports concerns about bleeding from her ankle sutures prompted return trip to ED on 5/10 evening, and reports that return to ED on yesterday 5/11 was prompted by recommendations of [facility] SN during SOC visit.     As previous recommendations as recently as 5/9 were for SNF and pt was not successful upon return with home health services, anticipate that SNF will continue to be recommended. Will need updated PT/OT evaluations. Discussed previous recommendations with pt's spouse, who is in agreement for rehab only. Spouse clarified that he does NOT wish to place pt in a nursing home for long term care at this time. CM validated spouse's preference, and reiterated that at present time we are speaking about rehabilitation placement. Further long term care planning discussions will occur at SNF based on pt's progress and family's ability to provide for her  arrangement Unknown at present  (Anticipating recommendations for SNF as this was prior rec's from last PT/OT visit on 5/9)   Family able to assist with home care needs: Yes   Would you like for me to discuss the discharge plan with any other family members/significant others, and if so, who? Yes  (Spouse, Wesley Violette)   Financial Resources Medicare   Social/Functional History   Lives With Spouse   Type of Home House   Home Layout One level   Home Access Stairs to enter with rails;Ramped entrance   Entrance Stairs - Number of Steps 2   Entrance Stairs - Rails Both   Bathroom Shower/Tub Tub/Shower unit   Bathroom Toilet Standard   Bathroom Equipment Grab bars in shower;Shower chair   Bathroom Accessibility Accessible   Receives Help From Family   Active  No   Patient's  Info Spouse, Wesley Oakes   Occupation Retired   Discharge Planning   Type of Residence House   Living Arrangements Spouse/Significant Other   Current Services Prior To Admission Home Care;Durable Medical Equipment  ([employer] )   Current DME Prior to Arrival Oxygen Therapy (Comment);Hospital Bed;Shower Chair;Walker;Wheelchair   Potential Assistance Needed Skilled Nursing Facility   DME Ordered? No   Potential Assistance Purchasing Medications No   Type of Home Care Services PT;OT;Nursing Services   Patient expects to be discharged to: Skilled nursing facility   Services At/After Discharge   Mode of Transport at Discharge BLS   Confirm Follow Up Transport Family   Condition of Participation: Discharge Planning   The Plan for Transition of Care is related to the following treatment goals: Skilled nursing facility for strengthening prior to return home   The Patient and/or Patient Representative was provided with a Choice of Provider? Patient Representative   Name of the Patient Representative who was provided with the Choice of Provider and agrees with the Discharge Plan?  Spouse, Wesley Oakes   Freedom of Choice list was provided

## 2024-05-12 NOTE — PROGRESS NOTES
on the floor rounding and to visit Ms. Oakes. She is located in room 107 on the Orthopedics floor.  engaged the family (her and her ) in conversation. They shared their thoughts and love for family (daughters).  affirmed their thoughts, provided empathetic listening  and encouraged them. Patient appeared sleepy today however she expressed gratitude for the visit.    Spiritual health Services are available 24 hours a day as requested.    Rev. LUIS ALBERTO Falk. [facility]   Paging Service [phone])

## 2024-05-12 NOTE — PLAN OF CARE
Problem: Occupational Therapy - Adult  Goal: By Discharge: Performs self-care activities at highest level of function for planned discharge setting.  See evaluation for individualized goals.  Description: FUNCTIONAL STATUS PRIOR TO ADMISSION:  Patient was not ambulation, secondary to LLE NWB status and recently prolong hospitalization, reporting she was transferring to/from manual W/C implementing lateral transfer technique.  Reports good PRN assist from .  Receives Help From: Family,  ,  ,  ,  ,  ,  ,  ,  ,  , Active : No     HOME SUPPORT: Patient lived  who is able to provide PRN assist.   reports he completed transfer training with patient during recent stay with [facility].    Occupational Therapy Goals:  Initiated 5/12/2024  1.  Patient will perform EOB lower body dressing with Minimal Assist within 7 day(s).  2.  Patient will perform EOB lower body bathing with Set-up within 7 day(s).  3.  Patient will perform upper body bathing/dressing with Set-up within 7 day(s).  4.  Patient will perform toilet transfers, implementing slide board to/from [facility], with Contact Guard Assist  within 7 day(s).  5.  Patient will perform all aspects of toileting with Contact Guard Assist within 7 day(s).  6.  Patient will participate in upper extremity therapeutic exercise/activities with Modified [place] for 10 minutes within 7 day(s).      Outcome: Progressing     OCCUPATIONAL THERAPY EVALUATION    Patient: Marilee Oakes (73 y.o. female)  Date: 5/12/2024  Primary Diagnosis: Drainage from wound [T14.8XXA]  Infection of postoperative wound due to methicillin-resistant Staphylococcus aureus [T81.49XA, B95.62]  Unable to care for self [Z78.9]         Precautions:     Left Lower Extremity Weight Bearing: Non Weight Bearing              ASSESSMENT :  The patient is limited by decreased self-efficacy, mild confusion regarding time (reported it was 2014), decreased strength/endurance, decreased

## 2024-05-13 ENCOUNTER — APPOINTMENT (OUTPATIENT)
Facility: HOSPITAL | Age: 74
DRG: 682 | End: 2024-05-13
Payer: MEDICARE

## 2024-05-13 LAB
ALBUMIN SERPL-MCNC: 3 G/DL (ref 3.5–5)
ANION GAP SERPL CALC-SCNC: 6 MMOL/L (ref 5–15)
BASOPHILS # BLD: 0 K/UL (ref 0–0.1)
BASOPHILS NFR BLD: 0 % (ref 0–1)
BUN SERPL-MCNC: 56 MG/DL (ref 6–20)
BUN/CREAT SERPL: 18 (ref 12–20)
CALCIUM SERPL-MCNC: 8.6 MG/DL (ref 8.5–10.1)
CHLORIDE SERPL-SCNC: 108 MMOL/L (ref 97–108)
CO2 SERPL-SCNC: 29 MMOL/L (ref 21–32)
CREAT SERPL-MCNC: 3.2 MG/DL (ref 0.55–1.02)
DIFFERENTIAL METHOD BLD: ABNORMAL
ECHO BSA: 2.3 M2
EOSINOPHIL # BLD: 0.1 K/UL (ref 0–0.4)
EOSINOPHIL NFR BLD: 1 % (ref 0–7)
ERYTHROCYTE [DISTWIDTH] IN BLOOD BY AUTOMATED COUNT: 18.5 % (ref 11.5–14.5)
GLUCOSE BLD STRIP.AUTO-MCNC: 106 MG/DL (ref 65–117)
GLUCOSE BLD STRIP.AUTO-MCNC: 120 MG/DL (ref 65–117)
GLUCOSE BLD STRIP.AUTO-MCNC: 151 MG/DL (ref 65–117)
GLUCOSE SERPL-MCNC: 178 MG/DL (ref 65–100)
HCT VFR BLD AUTO: 25.3 % (ref 35–47)
HGB BLD-MCNC: 7.4 G/DL (ref 11.5–16)
IMM GRANULOCYTES # BLD AUTO: 0.1 K/UL (ref 0–0.04)
IMM GRANULOCYTES NFR BLD AUTO: 1 % (ref 0–0.5)
LYMPHOCYTES # BLD: 0.9 K/UL (ref 0.8–3.5)
LYMPHOCYTES NFR BLD: 13 % (ref 12–49)
MCH RBC QN AUTO: 24.8 PG (ref 26–34)
MCHC RBC AUTO-ENTMCNC: 29.2 G/DL (ref 30–36.5)
MCV RBC AUTO: 84.9 FL (ref 80–99)
MONOCYTES # BLD: 0.9 K/UL (ref 0–1)
MONOCYTES NFR BLD: 13 % (ref 5–13)
NEUTS SEG # BLD: 4.7 K/UL (ref 1.8–8)
NEUTS SEG NFR BLD: 72 % (ref 32–75)
NRBC # BLD: 0 K/UL (ref 0–0.01)
NRBC BLD-RTO: 0 PER 100 WBC
PHOSPHATE SERPL-MCNC: 3.4 MG/DL (ref 2.6–4.7)
PLATELET # BLD AUTO: 67 K/UL (ref 150–400)
PLATELET COMMENT: ABNORMAL
POTASSIUM SERPL-SCNC: 3.6 MMOL/L (ref 3.5–5.1)
RBC # BLD AUTO: 2.98 M/UL (ref 3.8–5.2)
RBC MORPH BLD: ABNORMAL
RBC MORPH BLD: ABNORMAL
SERVICE CMNT-IMP: ABNORMAL
SERVICE CMNT-IMP: ABNORMAL
SERVICE CMNT-IMP: NORMAL
SODIUM SERPL-SCNC: 143 MMOL/L (ref 136–145)
WBC # BLD AUTO: 6.7 K/UL (ref 3.6–11)

## 2024-05-13 PROCEDURE — 6370000000 HC RX 637 (ALT 250 FOR IP): Performed by: CLINICAL NURSE SPECIALIST

## 2024-05-13 PROCEDURE — 36415 COLL VENOUS BLD VENIPUNCTURE: CPT

## 2024-05-13 PROCEDURE — 90935 HEMODIALYSIS ONE EVALUATION: CPT

## 2024-05-13 PROCEDURE — 82962 GLUCOSE BLOOD TEST: CPT

## 2024-05-13 PROCEDURE — 93970 EXTREMITY STUDY: CPT

## 2024-05-13 PROCEDURE — 2580000003 HC RX 258: Performed by: INTERNAL MEDICINE

## 2024-05-13 PROCEDURE — 6360000002 HC RX W HCPCS: Performed by: INTERNAL MEDICINE

## 2024-05-13 PROCEDURE — 94761 N-INVAS EAR/PLS OXIMETRY MLT: CPT

## 2024-05-13 PROCEDURE — 97530 THERAPEUTIC ACTIVITIES: CPT

## 2024-05-13 PROCEDURE — 97535 SELF CARE MNGMENT TRAINING: CPT

## 2024-05-13 PROCEDURE — 6370000000 HC RX 637 (ALT 250 FOR IP): Performed by: INTERNAL MEDICINE

## 2024-05-13 PROCEDURE — 1100000003 HC PRIVATE W/ TELEMETRY

## 2024-05-13 PROCEDURE — 80069 RENAL FUNCTION PANEL: CPT

## 2024-05-13 PROCEDURE — 85025 COMPLETE CBC W/AUTO DIFF WBC: CPT

## 2024-05-13 PROCEDURE — 99231 SBSQ HOSP IP/OBS SF/LOW 25: CPT | Performed by: CLINICAL NURSE SPECIALIST

## 2024-05-13 PROCEDURE — 94760 N-INVAS EAR/PLS OXIMETRY 1: CPT

## 2024-05-13 PROCEDURE — 5A1D70Z PERFORMANCE OF URINARY FILTRATION, INTERMITTENT, LESS THAN 6 HOURS PER DAY: ICD-10-PCS | Performed by: INTERNAL MEDICINE

## 2024-05-13 PROCEDURE — 94640 AIRWAY INHALATION TREATMENT: CPT

## 2024-05-13 RX ORDER — INSULIN LISPRO 100 [IU]/ML
0-4 INJECTION, SOLUTION INTRAVENOUS; SUBCUTANEOUS
Status: DISCONTINUED | OUTPATIENT
Start: 2024-05-13 | End: 2024-05-18 | Stop reason: HOSPADM

## 2024-05-13 RX ORDER — HEPARIN SODIUM 5000 [USP'U]/ML
5000 INJECTION, SOLUTION INTRAVENOUS; SUBCUTANEOUS EVERY 8 HOURS SCHEDULED
Status: CANCELLED | OUTPATIENT
Start: 2024-05-13

## 2024-05-13 RX ORDER — INSULIN LISPRO 100 [IU]/ML
0-4 INJECTION, SOLUTION INTRAVENOUS; SUBCUTANEOUS NIGHTLY
Status: DISCONTINUED | OUTPATIENT
Start: 2024-05-13 | End: 2024-05-18 | Stop reason: HOSPADM

## 2024-05-13 RX ADMIN — BENZONATATE 100 MG: 100 CAPSULE ORAL at 12:19

## 2024-05-13 RX ADMIN — ISOSORBIDE MONONITRATE 60 MG: 30 TABLET, EXTENDED RELEASE ORAL at 10:07

## 2024-05-13 RX ADMIN — SODIUM CHLORIDE, PRESERVATIVE FREE 10 ML: 5 INJECTION INTRAVENOUS at 12:24

## 2024-05-13 RX ADMIN — DOXYCYCLINE HYCLATE 100 MG: 100 TABLET, COATED ORAL at 12:19

## 2024-05-13 RX ADMIN — HYDROXYZINE HYDROCHLORIDE 25 MG: 25 TABLET, FILM COATED ORAL at 10:07

## 2024-05-13 RX ADMIN — ARFORMOTEROL TARTRATE: 15 SOLUTION RESPIRATORY (INHALATION) at 21:26

## 2024-05-13 RX ADMIN — INSULIN HUMAN 20 UNITS: 100 INJECTION, SUSPENSION SUBCUTANEOUS at 12:18

## 2024-05-13 RX ADMIN — PREGABALIN 150 MG: 150 CAPSULE ORAL at 12:19

## 2024-05-13 RX ADMIN — HYDROXYZINE HYDROCHLORIDE 25 MG: 25 TABLET, FILM COATED ORAL at 18:15

## 2024-05-13 RX ADMIN — BENZONATATE 100 MG: 100 CAPSULE ORAL at 18:14

## 2024-05-13 RX ADMIN — DOXYCYCLINE HYCLATE 100 MG: 100 TABLET, COATED ORAL at 20:16

## 2024-05-13 RX ADMIN — INSULIN HUMAN 8 UNITS: 100 INJECTION, SUSPENSION SUBCUTANEOUS at 12:25

## 2024-05-13 RX ADMIN — CARVEDILOL 25 MG: 12.5 TABLET, FILM COATED ORAL at 12:20

## 2024-05-13 RX ADMIN — ACETAMINOPHEN 650 MG: 325 TABLET ORAL at 05:09

## 2024-05-13 RX ADMIN — SODIUM CHLORIDE, PRESERVATIVE FREE 10 ML: 5 INJECTION INTRAVENOUS at 20:19

## 2024-05-13 RX ADMIN — ACETAMINOPHEN 650 MG: 325 TABLET ORAL at 20:15

## 2024-05-13 RX ADMIN — HEPARIN SODIUM 5000 UNITS: 5000 INJECTION INTRAVENOUS; SUBCUTANEOUS at 12:19

## 2024-05-13 RX ADMIN — PREDNISONE 40 MG: 20 TABLET ORAL at 12:19

## 2024-05-13 RX ADMIN — Medication 1 CAPSULE: at 12:19

## 2024-05-13 RX ADMIN — BENZONATATE 100 MG: 100 CAPSULE ORAL at 20:15

## 2024-05-13 RX ADMIN — PANTOPRAZOLE SODIUM 40 MG: 40 TABLET, DELAYED RELEASE ORAL at 05:09

## 2024-05-13 RX ADMIN — CARVEDILOL 25 MG: 12.5 TABLET, FILM COATED ORAL at 18:13

## 2024-05-13 RX ADMIN — ASPIRIN 81 MG: 81 TABLET, CHEWABLE ORAL at 12:18

## 2024-05-13 RX ADMIN — AMLODIPINE BESYLATE 5 MG: 5 TABLET ORAL at 10:07

## 2024-05-13 ASSESSMENT — PAIN SCALES - WONG BAKER: WONGBAKER_NUMERICALRESPONSE: HURTS A LITTLE BIT

## 2024-05-13 ASSESSMENT — PAIN SCALES - GENERAL
PAINLEVEL_OUTOF10: 3
PAINLEVEL_OUTOF10: 3

## 2024-05-13 ASSESSMENT — PAIN DESCRIPTION - LOCATION: LOCATION: INCISION;LEG

## 2024-05-13 ASSESSMENT — PAIN DESCRIPTION - ORIENTATION: ORIENTATION: LEFT;RIGHT

## 2024-05-13 NOTE — PLAN OF CARE
each attempt. Pt with some difficulty maintaining NWB L LE with fatigue, frequent cues provided. She was boosted/scooted up in chair end of session with max A x2 and roberto pad. She was left end of session up in chair, all needs met, call bell in reach, B LE elevated for comfort, chair alarm in place and roll belt + seatbelt alarm in place. Pt given busy blanket for fidget distractions and avasure telesitter in place end of session. She is unsafe to DC home at this time, will need SNF placement at [place].         PLAN:  Patient continues to benefit from skilled intervention to address the above impairments.  Continue treatment per established plan of care.        Recommendation for discharge: (in order for the patient to meet his/her long term goals): Therapy up to 5 days/week in Skilled nursing facility    Other factors to consider for discharge: impaired cognition, high risk for falls, and not safe to be alone    IF patient discharges home will need the following DME: continuing to assess with progress       SUBJECTIVE:   Patient stated, \"I'm Edne who are you?? Am I in the right place?? Where am I??.\"    OBJECTIVE DATA SUMMARY:   Critical Behavior:  Orientation  Overall Orientation Status: Impaired  Orientation Level: Oriented to person;Oriented to situation;Disoriented to place;Disoriented to time  Cognition  Overall Cognitive Status: Exceptions  Arousal/Alertness: Appropriate responses to stimuli  Following Commands: Follows one step commands with increased time  Attention Span: Difficulty attending to directions  Memory: Appears intact  Safety Judgement: Decreased awareness of need for safety  Insights: Impaired  Initiation: Requires cues for some  Sequencing: Requires cues for some    Functional Mobility Training:  Bed Mobility:  Bed Mobility Training  Bed Mobility Training: Yes  Interventions: Safety awareness training;Verbal cues  Rolling: Minimum assistance  Supine to Sit: Minimum assistance;Assist X2  Scooting:

## 2024-05-13 NOTE — PROGRESS NOTES
Physical Therapy     Chart reviewed up to date. Attempted to see pt for PT session, pt currently receiving dialysis at bedside and not available. Will defer and continue to follow.     Cheyenne Gusman PT, DPT, NCS

## 2024-05-13 NOTE — PROGRESS NOTES
RENAL  PROGRESS NOTE             Assessment   :                                               Plan:  HILARY on CKD stage 3b  Anxiety/agitation  Anemia  COPD  CHF with AICD  Afib w/ watchman  Left ankle fracture with infection  Recurrent uti As per patient request to stop dialysis immediately, at 10:30 AM dialysis was stopped.  Patient agreed that we would evaluate for needs for dialysis tomorrow again.  Duration of treatment was 110 minutes and neck fluid removal was 1.2 L.  She was hemodynamically stable and there were no complications.    ? Insight   about her need for dialysis  HD initiated , HD MWF for now - short HD today; ATN; Upc ratio 2.2; No hydro     added amlodipine 5 mg daily on      Hgb < 10; continue retacrit     perm cath  and   outpatient HD at [facility]                   Subjective:   Seen and examined while on dialysis.  Extremely anxious wants dialysis to be stopped right away   states that she did not consent for dialysis  Objective:   VITALS SIGNS:    BP (!) 167/81   Pulse 80   Temp 97.5 °F (36.4 °C)   Resp 18   Ht 1.753 m (5' 9\")   Wt 108.9 kg (240 lb)   SpO2 92%   BMI 35.44 kg/m²             Temp (24hrs), Av.9 °F (36.6 °C), Min:97.5 °F (36.4 °C), Max:98.2 °F (36.8 °C)         PHYSICAL EXAM:    Anxious, alert oriented to place and person  Not short of breath  With soft restraints  Tachycardic  Not with insight  DATA REVIEW:     INTAKE / OUTPUT:   Last shift:      701 - 1900  In: 500   Out: 1815   Last 3 shifts: 1901 -  0700  In: -   Out: 650 [Urine:650]    Intake/Output Summary (Last 24 hours) at 2024 1339  Last data filed at 2024 1030  Gross per 24 hour   Intake 500 ml   Output 2465 ml   Net -1965 ml         LABS:   LABS:  Recent Labs     24  0815 24  1240 05/10/24  0309 24  0020    141 138 136   K 3.6 3.7 4.2 4.1    106 105 104   CO2 29 32 28 29   BUN 56* 41* 47* 32*   CREATININE 3.20* 2.42* 2.67* 1.89*   CALCIUM

## 2024-05-13 NOTE — FLOWSHEET NOTE
Pre Dialysis:   05/13/24 0840   Treatment   Time On 0840   Time Off 1210   Treatment Goal 3L   Observations & Evaluations   Level of Consciousness 0   Heart Rhythm Regular   Respiratory Quality/Effort Unlabored   O2 Device None (Room air)   Skin Condition/Temp Dry;Warm   Abdomen Inspection Obese;Soft   Edema Generalized   Vital Signs   BP (!) 170/49   Temp 97.9 °F (36.6 °C)   Pulse 80   Respirations 18   Pain Assessment   Pain Assessment None - Denies Pain   Technical Checks   Dialysis Machine No. 04   RO Machine Number [id number]   Dialyzer Lot No. [id number]   Tubing Lot Number [id number]   All Connections Secure Yes   NS Bag Yes   Saline Line Double Clamped Yes   Dialyzer Revaclear 300   Prime Volume (mL) 250 mL   ICEBOAT I;C;E;B;O;A;T   RO Machine Log Sheet Completed Yes   Machine Alarm Self Test Completed;Passed   Air Foam Detector Tested;Proper Function   Extracorporeal Circuit Tested for Integrity Yes   Machine Conductivity 13.7   Manual Ph 7.4   Bleach Test (Neg) Yes   Bath Temperature 96.8 °F (36 °C)   Treatment Initiation   Dialyze Hours 3.5   Treatment  Initiation [place] Precautions maintained;Connections secured;Prime given;Venous Parameters set;Arterial Parameters set;Air foam detector engaged;Saline line double clamped;Dialyzer   During Hemodialysis Assessment   Blood Flow Rate (ml/min) 400 ml/min   Arterial Pressure (mmHg) -110 mmHg   Venous Pressure (mmHg) 100   TMP 60      Access Visible Yes   Ultrafiltration Rate (ml/hr) 1000 ml/hr   Ultrafiltration Removed (ml) 0 ml   Hemodialysis Central Access Right Subclavian   Placement Date/Time: 05/09/24 1100   Present on Admission/Arrival: No  Inserted by: IF  Orientation: Right  Access Location: Subclavian   Continued need for line? Yes   Site Assessment Clean, dry & intact   CVC Lumen Status Infusing   Venous Lumen Status Brisk blood return;Flushed;Infusing   Arterial Lumen Status Brisk blood return;Flushed;Infusing   Line Care Ports  Status Clean, dry & intact   Dressing Change Due 05/14/24   Post-Hemodialysis Assessment   Post-Treatment Procedures Blood returned;Catheter Capped, clamped with Saline x2 ports   Machine Disinfection Process Exterior Machine Disinfection;Acid/Vinegar Clean;Bleach   Rinseback Volume (ml) 250 ml   Blood Volume Processed (Liters) 41.7 L   Dialyzer Clearance Lightly streaked   Duration of Treatment (minutes) 110 minutes   Hemodialysis Intake (ml) 500 ml   Hemodialysis Output (ml) 1815 ml   NET Removed (ml) 1315   Tolerated Treatment Poor   Interventions Taken Other (Comment)  (Pt treatment stopped early per MD)   Physician Notified Yes   Provider Notification   Reason for Communication Patient request   Provider Name Marimar   Provider Notification Physician   Method of Communication Face to face   Response At bedside   Notification Time 1030       Primary RN SBAR: RACHEL Mock, RN    Comments: Pre labs drawn from red port.  1000 Pt extremely anxious, discussed with primary RN to give atarax.  1030 Pt continues to be agitated requesting to stop dialysis immediately. Dr. Minaya in to see pt while on dialysis, per MD stop treatment. He will reevaluate need for HD tomorrow.

## 2024-05-13 NOTE — PROGRESS NOTES
Occupational Therapy   05.13.2024    Chart reviewed in prep for OT treatment. Patient currently receiving dialysis at bedside and not available. Will defer and continue to follow. Thank you.     Araseli Houser MS, OTR/L

## 2024-05-13 NOTE — PLAN OF CARE
Problem: Occupational Therapy - Adult  Goal: By Discharge: Performs self-care activities at highest level of function for planned discharge setting.  See evaluation for individualized goals.  Description: FUNCTIONAL STATUS PRIOR TO ADMISSION:  Patient was not ambulation, secondary to LLE NWB status and recently prolong hospitalization, reporting she was transferring to/from manual W/C implementing lateral transfer technique.  Reports good PRN assist from .  Receives Help From: Family,  ,  ,  ,  ,  ,  ,  ,  ,  , Active : No     HOME SUPPORT: Patient lived  who is able to provide PRN assist.   reports he completed transfer training with patient during recent stay with [facility].    Occupational Therapy Goals:  Initiated 5/12/2024  1.  Patient will perform EOB lower body dressing with Minimal Assist within 7 day(s).  2.  Patient will perform EOB lower body bathing with Set-up within 7 day(s).  3.  Patient will perform upper body bathing/dressing with Set-up within 7 day(s).  4.  Patient will perform toilet transfers, implementing slide board to/from [facility], with Contact Guard Assist  within 7 day(s).  5.  Patient will perform all aspects of toileting with Contact Guard Assist within 7 day(s).  6.  Patient will participate in upper extremity therapeutic exercise/activities with Modified [place] for 10 minutes within 7 day(s).      Outcome: Progressing    OCCUPATIONAL THERAPY TREATMENT  Patient: Marilee Oakes (73 y.o. female)  Date: 5/13/2024  Primary Diagnosis: Drainage from wound [T14.8XXA]  Infection of postoperative wound due to methicillin-resistant Staphylococcus aureus [T81.49XA, B95.62]  Unable to care for self [Z78.9]       Precautions: Bed Alarm, Fall Risk (LLE NWB)   Left Lower Extremity Weight Bearing: Non Weight Bearing            Chart, occupational therapy assessment, plan of care, and goals were reviewed.    ASSESSMENT  Patient continues to benefit from skilled OT

## 2024-05-13 NOTE — PROGRESS NOTES
Nursing contacted Nocturnist/cross cover provider via non-urgent messaging system [employer] [employer] and notified patient getting out of her restraints and agitated and trying to repeatedly get out of bed and reported the prn haldol 2mg that received around 1830ish earlier helped to calm and relax. No other concerns reported. No acute distress reported. No other information provided by nurse, ALISIA. Ordered nurse ok to give the prn 2mg haldol dose due q6h early, can give now- msg back via perfect serve ok to give now. Will defer further evaluation/management to the day shift primary attending care team. Patient denies any further complaints or concerns. Nursing to notify Hospitalist for further/continued concerns. Will remain available overnight for further concerns if nursing/patient needs. Please note, there are RRT systems in this hospital in place that if nursing has acute or critical patient condition change or concern, this is to help facilitate and notify that patient needs immediate bedside evaluation by a provider.     Non-billable note.

## 2024-05-13 NOTE — PROGRESS NOTES
Restraint update:     5/12 @ 6084: Patient removed right wrist restraint. Restraint replaced and patient offered water. Patient refuses to put a gown on and is attempting to remove Cam boot. Will continue to monitor.

## 2024-05-13 NOTE — DIABETES MGMT
[facility] FOR [facility]ABETES HEALTH  DIABETES MANAGEMENT CONSULT    Consulted by Provider for advanced nursing evaluation and care for inpatient blood glucose management.    Evaluation and Action Plan   Marilee Oakes is a 73 year old female patient with diet controlled Type 2 diabetes, CKD on HD, CHF, CAD, A-Fib with Watchman device, COPD and peripheral neuropathy who had a recent L ankle fracture s/p ORIF and discharged to [facility].  She was treated for a UTI at rehab but was noted to be altered and pale precipitating ED visit.  Upon this admission, was noted to have a MRSA wound infection and treated with IV antibiotics.   Her current A1c is 5.8%. The patient took Metformin in the past but is was d/c'd by her PCP since BG's have been very well controlled. The patient's admission BG was 115.  She has since started on methylprednisolone and BG's are steadily rising. Infection and steroid use are likely primary culprits of BG elevation. The patient will require insulin to help override effects of the steroid. I suspect she will not require insulin once steroid dosing is complete.     She did discharged home with  on 5/10/24 despite recommendations for SNF and returned to the ED 5/11/24 with ankle pain.  She continues on prednisone, now weaned to 40mg daily. Will adjust NPH to offset steroid induced hyperglycemia.        Management Rationale Action Plan   Medication   Corrective insulin Using low dose sensitivity based on weight ACHS   Overriding steroid effect Using 0.2 units/kg/D based on weight Increased NPH to 20 units daily.       Additional orders  Continue carb consistent diet (60g CHO/meal)       Diabetes Discharge Plan   Medication  Will likely not require any diabetes medications at discharge given ESRD and in target A1C 5.8%   Additional orders            Initial Presentation   Marilee Oakes is a 73 y.o. female admitted  after experiencing complications from ankle surgery. The patient reportedly  Intervention Domain 2647 Decision-making Support   Nursing Interventions Identified diabetes self-management practices impeding diabetes control  Discussed diabetes survival skills related to  Healthy Plate eating plan; given handouts  Role of physical activity in improving insulin sensitivity and action  Procedure for blood glucose monitoring & options for low-cost products  Medications plan at discharge     Billing Code(s)   17448    Before making these care recommendations, I personally reviewed the hospitalization record, including notes, laboratory & diagnostic data and current medications, and examined the patient at the bedside.  Total minutes: 25    LIZZY Law - CNS  Diabetes Clinical Nurse Specialist  Program for Diabetes Health  Access via DBVu

## 2024-05-13 NOTE — PROGRESS NOTES
5/12 @ 2235: restraints initiated, patient combative and trying to get out of bed, rip out her hemodialysis catheter and screaming \"I have to get out of  here.\"     5/12 @ 2258: patient breaking free from restraints and crawling out of bed, patient refusing to put gown on, let staff take vital signs, and refusing to lay back in bed. NP contacted via perfect serve for dose of Haldol. Restraints re-applied    Will continue to monitor patient and assess restraints

## 2024-05-13 NOTE — PLAN OF CARE
Problem: Safety - Adult  Goal: Free from fall injury  5/13/2024 0300 by Jenny Mock RN  Outcome: Progressing  5/12/2024 1423 by Araceli Maki LPN  Outcome: Progressing  Flowsheets (Taken 5/12/2024 0737)  Free From Fall Injury: Instruct family/caregiver on patient safety     Problem: Chronic Conditions and Co-morbidities  Goal: Patient's chronic conditions and co-morbidity symptoms are monitored and maintained or improved  5/13/2024 0300 by Jenny Mock RN  Outcome: Progressing  5/12/2024 1423 by Araceli Maki LPN  Outcome: Progressing     Problem: Respiratory - Adult  Goal: Achieves optimal ventilation and oxygenation  5/13/2024 0300 by Jenny Mock RN  Outcome: Progressing  5/12/2024 1423 by Araceli Maki LPN  Outcome: Progressing     Problem: Safety - Medical Restraint  Goal: Remains free of injury from restraints (Restraint for Interference with Medical Device)  Description: INTERVENTIONS:  1. Determine that other, less restrictive measures have been tried or would not be effective before applying the restraint  2. Evaluate the patient's condition at the time of restraint application  3. Inform patient/family regarding the reason for restraint  4. Q2H: Monitor safety, psychosocial status, comfort, nutrition and hydration  Outcome: Progressing  Flowsheets  Taken 5/13/2024 0123  Remains free of injury from restraints (restraint for interference with medical device): (incontinent) --  Taken 5/12/2024 1911  Remains free of injury from restraints (restraint for interference with medical device):   Determine that other, less restrictive measures have been tried or would not be effective before applying the restraint   Evaluate the patient's condition at the time of restraint application   Every 2 hours: Monitor safety, psychosocial status, comfort, nutrition and hydration

## 2024-05-13 NOTE — CARE COORDINATION
Spoke with pt's  Wesley, who shared that he and pt do not want SNF, he would like referral sent to [facility] for pt to go back there; CM validated this preference, advised that referral will be sent for their medical team to review, Wesley verbalized understanding and agreement.    Corie Espino, [facility]  Care Management  [phone]

## 2024-05-13 NOTE — PROGRESS NOTES
Hospitalist Progress Note    NAME:   Marilee Oakes   : 1950   MRN: [id number]     Date: 2024    Patient PCP: Mariela Marino MD    Hospital Problem list:     Recent MRSA wound infection POA  Left ankle fracture s/p ORIF 2024  MRSA bacteremia with new fevers 4/3/2024 - 2024 discharged on daptomycin  MRSA left ankle infection s/p hardware removal -5/10/2024 discharged on PO doxycycline  Family unable to care for patient at home, SNF recommended last admit  Patient went home with home health despite SNF recommendations  Return back to ED last night and then again today morning with complaints of left ankle pain   Feeling of something popped like a stitch in the ankle  Inpatient PT OT eval for DC planning-if recommended last admission  Inpatient case management consult for SNF arrangement  Inpatient Ortho consult for evaluation of the left ankle sutures/wound  Last admission Ortho recommended   Continue PT/OT with NWB LLE in boot  Further surgical plans for left ankle will be established in the future once infection is cleared   Ortho was okay to remove boot for daily dressing changes.   Otherwise, must remain in boot.  Stitches were supposed to stay until 3 weeks post op .  Doxycycline  Orthopedics saw    Acute metabolic encephalopathy today  Acute delirium  Agitated and confused this evening  Similar confusion documented last admit, ? Hospital delirium  CT head negative last admit  Reduced lyrica from bid to qhs last admit  PRN haldol and hydroxyzine    New thrombocytopenia PLTs 67,000 POA  Plts normal beginning of last admit 143,000 to 197,000  Dropping at discharge to 84,000  ? Zyvox, ? Sepsis, ? ISABELLE  Got linezolid x 10 days, stopped on 5/10              Typically recovers over 1-2 weeks  Check Coags and ISABELLE antibody  Hold heparin or now  Check LE dopplers to rule out DVT     Chronic diastolic CHF with ICD POA LVEF 55-60%  CAD POA  A-fib with Watchman device POA  Continue

## 2024-05-14 LAB
ALBUMIN SERPL-MCNC: 2.9 G/DL (ref 3.5–5)
ALBUMIN/GLOB SERPL: 1 (ref 1.1–2.2)
ALP SERPL-CCNC: 134 U/L (ref 45–117)
ALT SERPL-CCNC: 17 U/L (ref 12–78)
ANION GAP SERPL CALC-SCNC: 4 MMOL/L (ref 5–15)
APPEARANCE UR: ABNORMAL
APTT PPP: 20.6 SEC (ref 22.1–31)
AST SERPL-CCNC: 14 U/L (ref 15–37)
BACTERIA URNS QL MICRO: NEGATIVE /HPF
BASOPHILS # BLD: 0 K/UL (ref 0–0.1)
BASOPHILS NFR BLD: 0 % (ref 0–1)
BILIRUB SERPL-MCNC: 0.8 MG/DL (ref 0.2–1)
BILIRUB UR QL: NEGATIVE
BUN SERPL-MCNC: 53 MG/DL (ref 6–20)
BUN/CREAT SERPL: 16 (ref 12–20)
CALCIUM SERPL-MCNC: 8.6 MG/DL (ref 8.5–10.1)
CHLORIDE SERPL-SCNC: 106 MMOL/L (ref 97–108)
CO2 SERPL-SCNC: 29 MMOL/L (ref 21–32)
COLOR UR: ABNORMAL
CREAT SERPL-MCNC: 3.34 MG/DL (ref 0.55–1.02)
DIFFERENTIAL METHOD BLD: ABNORMAL
EOSINOPHIL # BLD: 0 K/UL (ref 0–0.4)
EOSINOPHIL NFR BLD: 0 % (ref 0–7)
EPITH CASTS URNS QL MICRO: ABNORMAL /LPF
ERYTHROCYTE [DISTWIDTH] IN BLOOD BY AUTOMATED COUNT: 18.4 % (ref 11.5–14.5)
FIBRINOGEN PPP-MCNC: 192 MG/DL (ref 200–475)
GLOBULIN SER CALC-MCNC: 2.9 G/DL (ref 2–4)
GLUCOSE BLD STRIP.AUTO-MCNC: 123 MG/DL (ref 65–117)
GLUCOSE BLD STRIP.AUTO-MCNC: 167 MG/DL (ref 65–117)
GLUCOSE BLD STRIP.AUTO-MCNC: 262 MG/DL (ref 65–117)
GLUCOSE BLD STRIP.AUTO-MCNC: 64 MG/DL (ref 65–117)
GLUCOSE BLD STRIP.AUTO-MCNC: 67 MG/DL (ref 65–117)
GLUCOSE SERPL-MCNC: 235 MG/DL (ref 65–100)
GLUCOSE UR STRIP.AUTO-MCNC: NEGATIVE MG/DL
HAPTOGLOB SERPL-MCNC: 104 MG/DL (ref 30–200)
HCT VFR BLD AUTO: 25.6 % (ref 35–47)
HGB BLD-MCNC: 7.2 G/DL (ref 11.5–16)
HGB UR QL STRIP: ABNORMAL
HYALINE CASTS URNS QL MICRO: ABNORMAL /LPF (ref 0–2)
IMM GRANULOCYTES # BLD AUTO: 0.1 K/UL (ref 0–0.04)
IMM GRANULOCYTES NFR BLD AUTO: 1 % (ref 0–0.5)
INR PPP: 1.2 (ref 0.9–1.1)
KETONES UR QL STRIP.AUTO: NEGATIVE MG/DL
LDH SERPL L TO P-CCNC: 302 U/L (ref 81–246)
LEUKOCYTE ESTERASE UR QL STRIP.AUTO: ABNORMAL
LYMPHOCYTES # BLD: 0.3 K/UL (ref 0.8–3.5)
LYMPHOCYTES NFR BLD: 6 % (ref 12–49)
MCH RBC QN AUTO: 24.7 PG (ref 26–34)
MCHC RBC AUTO-ENTMCNC: 28.1 G/DL (ref 30–36.5)
MCV RBC AUTO: 87.7 FL (ref 80–99)
MONOCYTES # BLD: 0.1 K/UL (ref 0–1)
MONOCYTES NFR BLD: 2 % (ref 5–13)
NEUTS SEG # BLD: 5.2 K/UL (ref 1.8–8)
NEUTS SEG NFR BLD: 91 % (ref 32–75)
NITRITE UR QL STRIP.AUTO: NEGATIVE
NRBC # BLD: 0 K/UL (ref 0–0.01)
NRBC BLD-RTO: 0 PER 100 WBC
PH UR STRIP: 5 (ref 5–8)
PLATELET # BLD AUTO: 77 K/UL (ref 150–400)
POTASSIUM SERPL-SCNC: 4.4 MMOL/L (ref 3.5–5.1)
PROT SERPL-MCNC: 5.8 G/DL (ref 6.4–8.2)
PROT UR STRIP-MCNC: 100 MG/DL
PROTHROMBIN TIME: 11.9 SEC (ref 9–11.1)
RBC # BLD AUTO: 2.92 M/UL (ref 3.8–5.2)
RBC #/AREA URNS HPF: ABNORMAL /HPF (ref 0–5)
RBC MORPH BLD: ABNORMAL
RETICS # AUTO: 0.03 M/UL (ref 0.02–0.08)
RETICS/RBC NFR AUTO: 1 % (ref 0.7–2.1)
SERVICE CMNT-IMP: ABNORMAL
SERVICE CMNT-IMP: NORMAL
SODIUM SERPL-SCNC: 139 MMOL/L (ref 136–145)
SP GR UR REFRACTOMETRY: 1.01
SPECIMEN HOLD: NORMAL
THERAPEUTIC RANGE: ABNORMAL SECS (ref 58–77)
UROBILINOGEN UR QL STRIP.AUTO: 0.2 EU/DL (ref 0.2–1)
WBC # BLD AUTO: 5.7 K/UL (ref 3.6–11)
WBC URNS QL MICRO: ABNORMAL /HPF (ref 0–4)

## 2024-05-14 PROCEDURE — 94640 AIRWAY INHALATION TREATMENT: CPT

## 2024-05-14 PROCEDURE — 6370000000 HC RX 637 (ALT 250 FOR IP)

## 2024-05-14 PROCEDURE — 85730 THROMBOPLASTIN TIME PARTIAL: CPT

## 2024-05-14 PROCEDURE — 82962 GLUCOSE BLOOD TEST: CPT

## 2024-05-14 PROCEDURE — 86022 PLATELET ANTIBODIES: CPT

## 2024-05-14 PROCEDURE — 83615 LACTATE (LD) (LDH) ENZYME: CPT

## 2024-05-14 PROCEDURE — 83010 ASSAY OF HAPTOGLOBIN QUANT: CPT

## 2024-05-14 PROCEDURE — 85045 AUTOMATED RETICULOCYTE COUNT: CPT

## 2024-05-14 PROCEDURE — 94760 N-INVAS EAR/PLS OXIMETRY 1: CPT

## 2024-05-14 PROCEDURE — 80053 COMPREHEN METABOLIC PANEL: CPT

## 2024-05-14 PROCEDURE — 99231 SBSQ HOSP IP/OBS SF/LOW 25: CPT

## 2024-05-14 PROCEDURE — 1100000003 HC PRIVATE W/ TELEMETRY

## 2024-05-14 PROCEDURE — 2580000003 HC RX 258: Performed by: INTERNAL MEDICINE

## 2024-05-14 PROCEDURE — 85384 FIBRINOGEN ACTIVITY: CPT

## 2024-05-14 PROCEDURE — 85610 PROTHROMBIN TIME: CPT

## 2024-05-14 PROCEDURE — 0HQLXZZ REPAIR LEFT LOWER LEG SKIN, EXTERNAL APPROACH: ICD-10-PCS | Performed by: EMERGENCY MEDICINE

## 2024-05-14 PROCEDURE — 85025 COMPLETE CBC W/AUTO DIFF WBC: CPT

## 2024-05-14 PROCEDURE — 2700000000 HC OXYGEN THERAPY PER DAY

## 2024-05-14 PROCEDURE — 6370000000 HC RX 637 (ALT 250 FOR IP): Performed by: INTERNAL MEDICINE

## 2024-05-14 PROCEDURE — 6360000002 HC RX W HCPCS: Performed by: INTERNAL MEDICINE

## 2024-05-14 PROCEDURE — 36415 COLL VENOUS BLD VENIPUNCTURE: CPT

## 2024-05-14 PROCEDURE — 81001 URINALYSIS AUTO W/SCOPE: CPT

## 2024-05-14 RX ADMIN — BENZONATATE 100 MG: 100 CAPSULE ORAL at 20:25

## 2024-05-14 RX ADMIN — ASPIRIN 81 MG: 81 TABLET, CHEWABLE ORAL at 09:58

## 2024-05-14 RX ADMIN — INSULIN HUMAN 12 UNITS: 100 INJECTION, SUSPENSION SUBCUTANEOUS at 09:57

## 2024-05-14 RX ADMIN — PREDNISONE 40 MG: 20 TABLET ORAL at 09:58

## 2024-05-14 RX ADMIN — ISOSORBIDE MONONITRATE 60 MG: 30 TABLET, EXTENDED RELEASE ORAL at 09:58

## 2024-05-14 RX ADMIN — AMLODIPINE BESYLATE 5 MG: 5 TABLET ORAL at 09:58

## 2024-05-14 RX ADMIN — PANTOPRAZOLE SODIUM 40 MG: 40 TABLET, DELAYED RELEASE ORAL at 10:01

## 2024-05-14 RX ADMIN — DOXYCYCLINE HYCLATE 100 MG: 100 TABLET, COATED ORAL at 09:58

## 2024-05-14 RX ADMIN — SODIUM CHLORIDE, PRESERVATIVE FREE 10 ML: 5 INJECTION INTRAVENOUS at 20:25

## 2024-05-14 RX ADMIN — SODIUM CHLORIDE, PRESERVATIVE FREE 10 ML: 5 INJECTION INTRAVENOUS at 10:08

## 2024-05-14 RX ADMIN — HALOPERIDOL LACTATE 2 MG: 5 INJECTION, SOLUTION INTRAMUSCULAR at 17:04

## 2024-05-14 RX ADMIN — ARFORMOTEROL TARTRATE: 15 SOLUTION RESPIRATORY (INHALATION) at 07:54

## 2024-05-14 RX ADMIN — CARVEDILOL 25 MG: 12.5 TABLET, FILM COATED ORAL at 09:58

## 2024-05-14 RX ADMIN — BENZONATATE 100 MG: 100 CAPSULE ORAL at 09:58

## 2024-05-14 RX ADMIN — DOXYCYCLINE HYCLATE 100 MG: 100 TABLET, COATED ORAL at 20:25

## 2024-05-14 RX ADMIN — ARFORMOTEROL TARTRATE: 15 SOLUTION RESPIRATORY (INHALATION) at 18:31

## 2024-05-14 RX ADMIN — Medication 1 CAPSULE: at 09:58

## 2024-05-14 RX ADMIN — PREGABALIN 150 MG: 150 CAPSULE ORAL at 09:58

## 2024-05-14 ASSESSMENT — PAIN SCALES - GENERAL: PAINLEVEL_OUTOF10: 0

## 2024-05-14 NOTE — PROGRESS NOTES
ORTHO - Progress Note      Marilee Oakes     [id number]  female    73 y.o.    1950    Admit date:2024    SUBJECTIVE:     Marilee Oakes is a 73 y.o. female resting in the bed.   per nursing report- awake, restless and agitated all night    OBJECTIVE:       Physical Exam:  General: sleeping , cooperative, no distress.   Gastrointestinal:  non-distended .    Cardiovascular:  Brisk cap refill in all distal extremities   Genitourinary: Voiding independently   Respiratory: No respiratory distress  nasal o2  Musculoskeletal: Phillip's sign negative in bilateral lower extremities.  Calves soft, supple, non-tender upon palpation or with passive stretch.    Dressing/Wound:  bandage and dressing saturated(thin bloody),  No odor --- NO erythema or sig swelling.    Vital Signs:       Patient Vitals for the past 8 hrs:   BP Temp Temp src Pulse Resp SpO2   24 1051 123/65 97.5 °F (36.4 °C) Axillary 79 18 98 %   24 0947 (!) 149/92 97.7 °F (36.5 °C) -- 84 18 98 %                                          Temp (24hrs), Av.8 °F (36.6 °C), Min:97.5 °F (36.4 °C), Max:98.2 °F (36.8 °C)      Labs:        Recent Labs     24  0815   HCT 25.3*   HGB 7.4*     PT/OT:              ASSESSMENT / PLAN:   Principal Problem:    Unable to care for self  Resolved Problems:    * No resolved hospital problems. *     -  Dressing changed today with RN assistance --- daily change necessary with drainage   -  Heel floated in CAM boot  -  Continue PT/OT NWB on the LLE  -  DVT prophylaxis- SCD w/ Heparin(lorraine held)  -  DC planning - Placement     Signed By: Mike Vee PA-C

## 2024-05-14 NOTE — PROGRESS NOTES
RENAL  PROGRESS NOTE             Assessment   :                                               Plan:  HILARY on CKD stage 3b  Anxiety/agitation- better   Anemia  COPD  CHF with AICD  Afib w/ watchman  Left ankle fracture with infection  Recurrent uti -hemodialysis stopped after patient stated that she did not want dialysis.  - present.  With full insight today.  Stated that she will be open to dialysis if she needs it again.  However no need for dialysis today.  Will monitor labs tomorrow morning and discuss need for dialysis or not with her again tomorrow.  HD initiated , HD MWF for now - short HD today; ATN; Upc ratio 2.2; No hydro     added amlodipine 5 mg daily on      Hgb < 10; continue retacrit     perm cath  and   outpatient HD at [facility]                   Subjective:   Seen and examined.  No new complaints.  Present along with .  Full insight today.    Objective:   VITALS SIGNS:    BP (!) 146/70   Pulse 79   Temp 97.9 °F (36.6 °C) (Oral)   Resp 25   Ht 1.753 m (5' 9\")   Wt 108.9 kg (240 lb)   SpO2 93%   BMI 35.44 kg/m²             Temp (24hrs), Av.9 °F (36.6 °C), Min:97.5 °F (36.4 °C), Max:98.2 °F (36.8 °C)         PHYSICAL EXAM:    Obese, pleasant, alert oriented x 3  No respiratory distress  Nonlabored respiration  Mild edema  Nontunneled hemodialysis access  Coherent  DATA REVIEW:     INTAKE / OUTPUT:   Last shift:      No intake/output data recorded.  Last 3 shifts:  1901 -  0700  In: 500   Out: 2465 [Urine:650]    Intake/Output Summary (Last 24 hours) at 2024 0917  Last data filed at 2024 1030  Gross per 24 hour   Intake 500 ml   Output 1815 ml   Net -1315 ml         LABS:   LABS:  Recent Labs     24  0815 24  1240 05/10/24  0309 24  0020    141 138 136   K 3.6 3.7 4.2 4.1    106 105 104   CO2 29 32 28 29   BUN 56* 41* 47* 32*   CREATININE 3.20* 2.42* 2.67* 1.89*   CALCIUM 8.6 8.3* 8.5 7.8*   PHOS 3.4  --  4.2 3.2

## 2024-05-14 NOTE — ED PROVIDER NOTES
EMERGENCY DEPARTMENT HISTORY AND PHYSICAL EXAM    Date: 5/10/2024  Patient Name: Marilee Oakes  Patient Age and Sex: 73 y.o. female  MRN:  [id number]  CSN:  [id number]    History of Present Illness     Chief Complaint   Patient presents with    Wound Check     BIBEMS from home surgical wound started bleeding tonight after attempting to walk on it to go to BR. Pt reports increased pain and bleeding        History Provided By: Patient    Ability to gather history was limited by:     HPI: Marilee Oakes, 73 y.o. female   With extensive medical comorbidities, complains of bleeding from her recent surgical wound on the left ankle.  No significant acute pain.  She was just discharged in the hospital less than 24 hours ago after orthopedic ORIF for a left ankle bimalleolar fracture.      Tobacco Use      Smoking status: Never      Smokeless tobacco: Never     Past History   The patient's medical, surgical, and social history were reviewed by me today.    Current Medications:  No current facility-administered medications on file prior to encounter.     Current Outpatient Medications on File Prior to Encounter   Medication Sig Dispense Refill    pregabalin (LYRICA) 150 MG capsule Take 1 capsule by mouth daily for 30 days. Max Daily Amount: 150 mg 30 capsule 0    insulin NPH (HUMULIN N;NOVOLIN N) 100 UNIT/ML injection vial Inject 8 Units into the skin daily for 3 days 1 each 0    amLODIPine (NORVASC) 5 MG tablet Take 1 tablet by mouth daily 30 tablet 3    predniSONE (DELTASONE) 10 MG tablet Take 4 tablets by mouth daily for 2 days, THEN 3 tablets daily for 2 days, THEN 2 tablets daily for 2 days, THEN 1 tablet daily for 2 days, THEN 0.5 tablets daily for 2 days. 21 tablet 0    benzonatate (TESSALON) 100 MG capsule Take 1 capsule by mouth in the morning, at noon, and at bedtime for 7 days 21 capsule 0    polyethylene glycol (GLYCOLAX) 17 g packet Take 1 packet by mouth daily as needed for Constipation 527 g 0

## 2024-05-14 NOTE — DIABETES MGMT
[facility]ALTH  DIABETES MANAGEMENT CONSULT    Consulted by Provider for advanced nursing evaluation and care for inpatient blood glucose management.    Evaluation and Action Plan   Marilee Oakes is a 73 year old female patient with diet controlled Type 2 diabetes. Her current A1c is 5.8%. The patient took Metformin in the past but is was d/c'd by her PCP since BG's have been very well controlled. The patient's admission BG was 115.  She has since started on methylprednisolone and BG's are steadily rising. Infection and steroid use are likely primary culprits of BG elevation. The patient will require insulin to help override effects of the steroid. I suspect she will not require insulin once steroid dosing is complete.   BG's are stable on the current regimen. The patient reports having a dialysis treatment yesterday without complications. The patient does look better today. I will continue to use the NPH to override the effects of the steroid. I suspect the patient will not require insulin once steroid dosing is complete.   Steroid dosing continues although it has been decreased.  this morning. I have slightly decreased the insulin linked with the steroid.   Bg's remain stable. The steroid has been further tapered. I have decreased the NPH dose as well. The patient will likely not require insulin was the steroid is stopped. ASAEL for IR procedure (port for dialysis).   The patient uses daily prednisone. The basal dose was increased yesterday. However BG was 64. Insulin dose reduced for this morning. The patient is eating well with a pleasant mood today.     Management Rationale Action Plan   Medication   Corrective insulin Using medium dose sensitivity based on weight    Overriding steroid effect Using 0.1 units/kg/D based on weight Use 12 units NPH  with  40 mg methylprednisolone    Additional orders          Diabetes Discharge Plan   Medication  TBD   Additional orders

## 2024-05-15 LAB
ALBUMIN SERPL-MCNC: 2.8 G/DL (ref 3.5–5)
ANION GAP SERPL CALC-SCNC: 6 MMOL/L (ref 5–15)
BASOPHILS # BLD: 0 K/UL (ref 0–0.1)
BASOPHILS NFR BLD: 0 % (ref 0–1)
BUN SERPL-MCNC: 52 MG/DL (ref 6–20)
BUN/CREAT SERPL: 15 (ref 12–20)
CALCIUM SERPL-MCNC: 8.4 MG/DL (ref 8.5–10.1)
CHLORIDE SERPL-SCNC: 108 MMOL/L (ref 97–108)
CO2 SERPL-SCNC: 30 MMOL/L (ref 21–32)
CREAT SERPL-MCNC: 3.41 MG/DL (ref 0.55–1.02)
DIFFERENTIAL METHOD BLD: ABNORMAL
EOSINOPHIL # BLD: 0 K/UL (ref 0–0.4)
EOSINOPHIL NFR BLD: 0 % (ref 0–7)
ERYTHROCYTE [DISTWIDTH] IN BLOOD BY AUTOMATED COUNT: 18.7 % (ref 11.5–14.5)
GLUCOSE BLD STRIP.AUTO-MCNC: 116 MG/DL (ref 65–117)
GLUCOSE BLD STRIP.AUTO-MCNC: 144 MG/DL (ref 65–117)
GLUCOSE BLD STRIP.AUTO-MCNC: 216 MG/DL (ref 65–117)
GLUCOSE BLD STRIP.AUTO-MCNC: 301 MG/DL (ref 65–117)
GLUCOSE BLD STRIP.AUTO-MCNC: 312 MG/DL (ref 65–117)
GLUCOSE SERPL-MCNC: 132 MG/DL (ref 65–100)
HCT VFR BLD AUTO: 23.8 % (ref 35–47)
HGB BLD-MCNC: 6.8 G/DL (ref 11.5–16)
IMM GRANULOCYTES # BLD AUTO: 0.1 K/UL (ref 0–0.04)
IMM GRANULOCYTES NFR BLD AUTO: 1 % (ref 0–0.5)
LYMPHOCYTES # BLD: 1 K/UL (ref 0.8–3.5)
LYMPHOCYTES NFR BLD: 19 % (ref 12–49)
MCH RBC QN AUTO: 24.5 PG (ref 26–34)
MCHC RBC AUTO-ENTMCNC: 28.6 G/DL (ref 30–36.5)
MCV RBC AUTO: 85.9 FL (ref 80–99)
MONOCYTES # BLD: 0.6 K/UL (ref 0–1)
MONOCYTES NFR BLD: 11 % (ref 5–13)
NEUTS SEG # BLD: 3.5 K/UL (ref 1.8–8)
NEUTS SEG NFR BLD: 69 % (ref 32–75)
NRBC # BLD: 0 K/UL (ref 0–0.01)
NRBC BLD-RTO: 0 PER 100 WBC
PHOSPHATE SERPL-MCNC: 3.8 MG/DL (ref 2.6–4.7)
PLATELET # BLD AUTO: 65 K/UL (ref 150–400)
PLATELET COMMENT: ABNORMAL
POTASSIUM SERPL-SCNC: 3.9 MMOL/L (ref 3.5–5.1)
RBC # BLD AUTO: 2.77 M/UL (ref 3.8–5.2)
RBC MORPH BLD: ABNORMAL
RBC MORPH BLD: ABNORMAL
SERVICE CMNT-IMP: ABNORMAL
SERVICE CMNT-IMP: NORMAL
SODIUM SERPL-SCNC: 144 MMOL/L (ref 136–145)
WBC # BLD AUTO: 5.2 K/UL (ref 3.6–11)

## 2024-05-15 PROCEDURE — APPNB30 APP NON BILLABLE TIME 0-30 MINS: Performed by: ORTHOPAEDIC SURGERY

## 2024-05-15 PROCEDURE — 94760 N-INVAS EAR/PLS OXIMETRY 1: CPT

## 2024-05-15 PROCEDURE — 1100000003 HC PRIVATE W/ TELEMETRY

## 2024-05-15 PROCEDURE — 94640 AIRWAY INHALATION TREATMENT: CPT

## 2024-05-15 PROCEDURE — 2700000000 HC OXYGEN THERAPY PER DAY

## 2024-05-15 PROCEDURE — 2580000003 HC RX 258: Performed by: INTERNAL MEDICINE

## 2024-05-15 PROCEDURE — 85025 COMPLETE CBC W/AUTO DIFF WBC: CPT

## 2024-05-15 PROCEDURE — 76937 US GUIDE VASCULAR ACCESS: CPT

## 2024-05-15 PROCEDURE — 6370000000 HC RX 637 (ALT 250 FOR IP): Performed by: CLINICAL NURSE SPECIALIST

## 2024-05-15 PROCEDURE — 6370000000 HC RX 637 (ALT 250 FOR IP): Performed by: INTERNAL MEDICINE

## 2024-05-15 PROCEDURE — 6360000002 HC RX W HCPCS: Performed by: INTERNAL MEDICINE

## 2024-05-15 PROCEDURE — 36415 COLL VENOUS BLD VENIPUNCTURE: CPT

## 2024-05-15 PROCEDURE — 6370000000 HC RX 637 (ALT 250 FOR IP)

## 2024-05-15 PROCEDURE — 80069 RENAL FUNCTION PANEL: CPT

## 2024-05-15 PROCEDURE — 82962 GLUCOSE BLOOD TEST: CPT

## 2024-05-15 RX ADMIN — DOXYCYCLINE HYCLATE 100 MG: 100 TABLET, COATED ORAL at 10:00

## 2024-05-15 RX ADMIN — ARFORMOTEROL TARTRATE: 15 SOLUTION RESPIRATORY (INHALATION) at 08:11

## 2024-05-15 RX ADMIN — ISOSORBIDE MONONITRATE 60 MG: 30 TABLET, EXTENDED RELEASE ORAL at 10:00

## 2024-05-15 RX ADMIN — CARVEDILOL 25 MG: 12.5 TABLET, FILM COATED ORAL at 09:59

## 2024-05-15 RX ADMIN — DOXYCYCLINE HYCLATE 100 MG: 100 TABLET, COATED ORAL at 23:02

## 2024-05-15 RX ADMIN — ASPIRIN 81 MG: 81 TABLET, CHEWABLE ORAL at 10:00

## 2024-05-15 RX ADMIN — INSULIN LISPRO 3 UNITS: 100 INJECTION, SOLUTION INTRAVENOUS; SUBCUTANEOUS at 16:52

## 2024-05-15 RX ADMIN — BENZONATATE 100 MG: 100 CAPSULE ORAL at 23:02

## 2024-05-15 RX ADMIN — BENZONATATE 100 MG: 100 CAPSULE ORAL at 10:01

## 2024-05-15 RX ADMIN — PREDNISONE 40 MG: 20 TABLET ORAL at 10:00

## 2024-05-15 RX ADMIN — SODIUM CHLORIDE, PRESERVATIVE FREE 10 ML: 5 INJECTION INTRAVENOUS at 23:02

## 2024-05-15 RX ADMIN — INSULIN HUMAN 12 UNITS: 100 INJECTION, SUSPENSION SUBCUTANEOUS at 09:59

## 2024-05-15 RX ADMIN — CARVEDILOL 25 MG: 12.5 TABLET, FILM COATED ORAL at 16:52

## 2024-05-15 RX ADMIN — PANTOPRAZOLE SODIUM 40 MG: 40 TABLET, DELAYED RELEASE ORAL at 05:23

## 2024-05-15 RX ADMIN — ARFORMOTEROL TARTRATE: 15 SOLUTION RESPIRATORY (INHALATION) at 20:58

## 2024-05-15 RX ADMIN — Medication 1 CAPSULE: at 10:00

## 2024-05-15 RX ADMIN — AMLODIPINE BESYLATE 5 MG: 5 TABLET ORAL at 10:00

## 2024-05-15 RX ADMIN — PREGABALIN 150 MG: 150 CAPSULE ORAL at 10:00

## 2024-05-15 ASSESSMENT — PAIN SCALES - GENERAL
PAINLEVEL_OUTOF10: 0
PAINLEVEL_OUTOF10: 0

## 2024-05-15 NOTE — PROGRESS NOTES
Hospitalist Progress Note    NAME:   Marilee Oakes   : 1950   MRN: [id number]     Date: 2024    Patient PCP: Mariela Marino MD    Hospital Problem list:     Recent MRSA wound infection POA  Left ankle fracture s/p ORIF 2024  MRSA bacteremia with new fevers 4/3/2024 - 2024 discharged on daptomycin  MRSA left ankle infection s/p hardware removal -5/10/2024 discharged on PO doxycycline  Family unable to care for patient at home, SNF recommended last admit  Patient went home with home health despite SNF recommendations  Return back to ED last night and then again today morning with complaints of left ankle pain   Feeling of something popped like a stitch in the ankle  Inpatient PT OT eval for DC planning-if recommended last admission  Inpatient case management consult for SNF arrangement  Inpatient Ortho consult for evaluation of the left ankle sutures/wound  Last admission Ortho recommended   Continue PT/OT with NWB LLE in boot  Further surgical plans for left ankle will be established in the future once infection is cleared   Ortho was okay to remove boot for daily dressing changes.   Otherwise, must remain in boot.  Stitches were supposed to stay until 3 weeks post op .  Doxycycline  Orthopedics saw   concerned re UTI, repeat UA with no pyuria or bactiuria    Acute metabolic encephalopathy   Acute delirium recurrent  Agitated and confused at times  Similar confusion documented last admit, ? Hospital delirium  CT head negative last admit  Reduced lyrica from bid to qhs last admit  PRN haldol and hydroxyzine    New thrombocytopenia PLTs 67,000 POA  Plts normal beginning of last admit 143,000 to 197,000  Dropping at discharge to 84,000  ? Zyvox, ? Sepsis, ? ISABELLE  Got linezolid x 10 days, stopped on 5/10              Typically recovers over 1-2 weeks  PT INR 1.2, PTT 20.6, fibrinogen 192  Normal B12 516 and folate 10.6 last admit  , T bili 0.8, haptoglobin

## 2024-05-15 NOTE — PROGRESS NOTES
Haldol effective patient resting .  No attempts to get out of bed .  No yelling don't touch me while in the room alone.

## 2024-05-15 NOTE — PROGRESS NOTES
Occupational Therapy    Chart reviewed and interventions attempted. Pt currently utilizing bedpan and requested more time. OT will defer and continue to follow.     Addendum    Chart reviewed for second attempt. Pt's hgb noted to be 6.8, therefore OT will defer and follow up once hgb >7.0.

## 2024-05-15 NOTE — PROGRESS NOTES
Hospitalist Progress Note    NAME:   Marilee Oakes   : 1950   MRN: [id number]     Date: 5/15/2024    Patient PCP: Mariela Marino MD    Estimated discharge date: ?-17    Barriers: SNF placement, sundowning controlled and off TeleSitter for 24 hours    Hospital Problem list:     Recent MRSA wound infection POA  Left ankle fracture s/p ORIF 2024  MRSA bacteremia with new fevers 4/3/2024 - 2024 discharged on daptomycin  MRSA left ankle infection s/p hardware removal -5/10/2024 discharged on PO doxycycline  Family unable to care for patient at home, SNF recommended last admit  Patient went home with home health despite SNF recommendations  Return back to ED last night and then again today morning with complaints of left ankle pain   Feeling of something popped like a stitch in the ankle  Inpatient PT OT eval for DC planning-if recommended last admission  Inpatient case management consult for SNF arrangement  Inpatient Ortho consult for evaluation of the left ankle sutures/wound  Last admission Ortho recommended   Continue PT/OT with NWB LLE in boot  Further surgical plans for left ankle will be established in the future once infection is cleared   Ortho was okay to remove boot for daily dressing changes.   Otherwise, must remain in boot.  Stitches were supposed to stay until 3 weeks post op .  Doxycycline  Orthopedics seen the patient-recommends nonweightbearing on left lower extremity, SNF/rehab this time   concerned re UTI, repeat UA with no pyuria or bactiuria    Acute metabolic encephalopathy -fluctuating due to sundowning, currently mental status stable with  at bedside today morning  Acute delirium recurrent  Agitated and confused at times  Similar confusion documented last admit, ? Hospital delirium likely  CT head negative last admit  Reduced lyrica from bid to qhs last admit  PRN haldol and hydroxyzine  DC telemetry sitter today morning as patient much  speech,   Psych:    Not anxious nor agitated  Skin:  No rashes.  No jaundice     Reviewed most current lab test results and cultures  YES  Reviewed most current radiology test results   YES  Review and summation of old records today    NO  Reviewed patient's current orders and MAR    YES  PMH/SH reviewed - no change compared to H&P    ________________________________________________________________________        Comments   >50% of visit spent in counseling and coordination of care     ________________________________________________________________________  Lisa Troy MD     Procedures: see electronic medical records for all procedures/Xrays and details which were not copied into this note but were reviewed prior to creation of Plan.      LABS:  I reviewed today's most current labs and imaging studies.  Pertinent labs include:  Recent Labs     05/13/24  0815 05/14/24  1747 05/15/24  1033   WBC 6.7 5.7 5.2   HGB 7.4* 7.2* 6.8*   HCT 25.3* 25.6* 23.8*   PLT 67* 77* 65*       Recent Labs     05/13/24  0815 05/14/24  1747 05/15/24  1033    139 144   K 3.6 4.4 3.9    106 108   CO2 29 29 30   GLUCOSE 178* 235* 132*   BUN 56* 53* 52*   CREATININE 3.20* 3.34* 3.41*   CALCIUM 8.6 8.6 8.4*   PHOS 3.4  --  3.8   BILITOT  --  0.8  --    AST  --  14*  --    ALT  --  17  --    INR  --  1.2*  --          Signed: Lisa Troy MD

## 2024-05-15 NOTE — PLAN OF CARE
Problem: Safety - Adult  Goal: Free from fall injury  Outcome: Progressing     Problem: Chronic Conditions and Co-morbidities  Goal: Patient's chronic conditions and co-morbidity symptoms are monitored and maintained or improved  Outcome: Progressing     Problem: Respiratory - Adult  Goal: Achieves optimal ventilation and oxygenation  Outcome: Progressing     Problem: Safety - Medical Restraint  Goal: Remains free of injury from restraints (Restraint for Interference with Medical Device)  Description: INTERVENTIONS:  1. Determine that other, less restrictive measures have been tried or would not be effective before applying the restraint  2. Evaluate the patient's condition at the time of restraint application  3. Inform patient/family regarding the reason for restraint  4. Q2H: Monitor safety, psychosocial status, comfort, nutrition and hydration  Outcome: Progressing

## 2024-05-15 NOTE — CARE COORDINATION
Spoke with pt's  Wesley re discharge plans. Wesley stated that preferred post-acute facility is [facility], where pt will receive lower-intensity rehab and there is a dialysis center on-site. CM validated this preference, advised Wesley that [facility] referral will be canceled and pt/ team will proceed with [facility]. CM advised Wesley that [facility] will need to arrange dialysis place at facility which may cause delay, if there are no delays pt could discharge as soon as Thursday, Wesley verbalized understanding. CM notified facility liaisons of choice. Will follow up in the morning for discharge readiness, [facility] liaison confirmed a bed will be available.    Corie Espino, [facility]  Care Management  [phone]

## 2024-05-15 NOTE — PLAN OF CARE
Problem: Safety - Adult  Goal: Free from fall injury  5/15/2024 1240 by Esther Sierra RN  Outcome: Adequate for Discharge  Flowsheets (Taken 5/15/2024 0945)  Free From Fall Injury: Instruct family/caregiver on patient safety  5/14/2024 2304 by Mayur Hurley RN  Outcome: Progressing     Problem: Chronic Conditions and Co-morbidities  Goal: Patient's chronic conditions and co-morbidity symptoms are monitored and maintained or improved  5/15/2024 1240 by Esther Sierra RN  Outcome: Adequate for Discharge  5/14/2024 2304 by Mayur Hurley RN  Outcome: Progressing     Problem: Respiratory - Adult  Goal: Achieves optimal ventilation and oxygenation  5/15/2024 1240 by Esther Sierra RN  Outcome: Adequate for Discharge  5/15/2024 0903 by Marva Ramos RT  Outcome: Progressing  5/14/2024 2304 by Mayur Hurley RN  Outcome: Progressing     Problem: Safety - Medical Restraint  Goal: Remains free of injury from restraints (Restraint for Interference with Medical Device)  Description: INTERVENTIONS:  1. Determine that other, less restrictive measures have been tried or would not be effective before applying the restraint  2. Evaluate the patient's condition at the time of restraint application  3. Inform patient/family regarding the reason for restraint  4. Q2H: Monitor safety, psychosocial status, comfort, nutrition and hydration  5/15/2024 1240 by Esther Sierra RN  Outcome: Adequate for Discharge  5/14/2024 2304 by Mayur Hurley RN  Outcome: Progressing     Problem: Pain  Goal: Verbalizes/displays adequate comfort level or baseline comfort level  Outcome: Adequate for Discharge

## 2024-05-15 NOTE — PROGRESS NOTES
RENAL  PROGRESS NOTE             Assessment   :                                               Plan:  HILARY on CKD stage 3b  Anxiety/agitation- better   Anemia  COPD  CHF with AICD  Afib w/ watchman  Left ankle fracture with infection  Recurrent uti Creatinine trend last 3 days 3.2 => 3.34 => 3.41.  Deferred dialysis today  Urine output not accurately recorded  -hemodialysis stopped after patient stated that she did not want dialysis.  HD initiated , HD MWF for now - short HD today; ATN; Upc ratio 2.2; No hydro     added amlodipine 5 mg daily on      Hgb < 10; continue retacrit     perm cath  and   outpatient HD at [facility]                   Subjective:   Seen and examined.  No new complaints.      Objective:   VITALS SIGNS:    /68   Pulse 79   Temp 97.3 °F (36.3 °C) (Oral)   Resp 16   Ht 1.753 m (5' 9\")   Wt 108.9 kg (240 lb)   SpO2 98%   BMI 35.44 kg/m²             Temp (24hrs), Av.7 °F (37.6 °C), Min:97.3 °F (36.3 °C), Max:111.2 °F (44 °C)         PHYSICAL EXAM:    Obese, pleasant, alert oriented x 3  No respiratory distress  Nonlabored respiration  Mild edema  Nontunneled hemodialysis access  Coherent  DATA REVIEW:     INTAKE / OUTPUT:   Last shift:      No intake/output data recorded.  Last 3 shifts: No intake/output data recorded.  No intake or output data in the 24 hours ending 05/15/24 1804        LABS:   LABS:  Recent Labs     05/15/24  1033 24  1747 24  0815 24  1240 05/10/24  0309 24  0020    139 143 141 138 136   K 3.9 4.4 3.6 3.7 4.2 4.1    106 108 106 105 104   CO2 30 29 29 32 28 29   BUN 52* 53* 56* 41* 47* 32*   CREATININE 3.41* 3.34* 3.20* 2.42* 2.67* 1.89*   CALCIUM 8.4* 8.6 8.6 8.3* 8.5 7.8*   PHOS 3.8  --  3.4  --  4.2 3.2   MG  --   --   --  1.7 1.9 1.9     Recent Labs     05/15/24  1033 24  1747 24  0815   WBC 5.2 5.7 6.7   HGB 6.8* 7.2* 7.4*   HCT 23.8* 25.6* 25.3*   PLT 65* 77* 67*     No results for input(s): \"KU\",  \"CLU\", \"CREAU\" in the last 720 hours.    Invalid input(s): \"ALEJANDRA\", \"PROU\"

## 2024-05-15 NOTE — PLAN OF CARE
Problem: Safety - Adult  Goal: Free from fall injury  5/15/2024 1251 by Esther Sierra RN  Outcome: Adequate for Discharge  5/15/2024 1240 by Esther Sierra RN  Outcome: Adequate for Discharge  Flowsheets (Taken 5/15/2024 0945)  Free From Fall Injury: Instruct family/caregiver on patient safety  5/14/2024 2304 by Mayur Hurley RN  Outcome: Progressing     Problem: Chronic Conditions and Co-morbidities  Goal: Patient's chronic conditions and co-morbidity symptoms are monitored and maintained or improved  5/15/2024 1251 by Esther Sierra RN  Outcome: Adequate for Discharge  5/15/2024 1240 by Esther Sierra RN  Outcome: Adequate for Discharge  5/14/2024 2304 by Mayur Hurley RN  Outcome: Progressing     Problem: Respiratory - Adult  Goal: Achieves optimal ventilation and oxygenation  5/15/2024 1251 by Esther Sierra RN  Outcome: Adequate for Discharge  5/15/2024 1240 by Esther Sierra RN  Outcome: Adequate for Discharge  5/15/2024 0903 by Marva Ramos RT  Outcome: Progressing  5/14/2024 2304 by Mayur Hurley RN  Outcome: Progressing     Problem: Safety - Medical Restraint  Goal: Remains free of injury from restraints (Restraint for Interference with Medical Device)  Description: INTERVENTIONS:  1. Determine that other, less restrictive measures have been tried or would not be effective before applying the restraint  2. Evaluate the patient's condition at the time of restraint application  3. Inform patient/family regarding the reason for restraint  4. Q2H: Monitor safety, psychosocial status, comfort, nutrition and hydration  5/15/2024 1251 by Esther Sierra RN  Outcome: Adequate for Discharge  5/15/2024 1240 by Esther Sierra RN  Outcome: Adequate for Discharge  5/14/2024 2304 by Mayur Hurley RN  Outcome: Progressing     Problem: Pain  Goal: Verbalizes/displays adequate comfort level or baseline comfort level  5/15/2024 1251 by Esther Sierra RN  Outcome: Adequate for

## 2024-05-15 NOTE — PROGRESS NOTES
Patient refused vitals, refused blood sugar check.  Patient trying to climb out of bed.  Patient screaming Awhhh , awhhh, don't touch me dont touch me\" no one in the room .  Haldol  2 mg given iM .  Right upper arm .

## 2024-05-15 NOTE — PROGRESS NOTES
ORTHO - Progress Note      Marilee Oakes     [id number]  female    73 y.o.    1950    Admit date:2024    SUBJECTIVE:     Marilee Oakes is a 73 y.o. female resting in the bed.     at bedside.    OBJECTIVE:       Physical Exam:  General: sleeping , cooperative, no distress.   Gastrointestinal:  non-distended .    Cardiovascular:  Brisk cap refill in all distal extremities   Genitourinary: Voiding independently   Respiratory: No respiratory distress    Musculoskeletal: Phillip's sign negative in bilateral lower extremities.  Calves soft, supple, non-tender upon palpation or with passive stretch.    Dressing/Wound:  bandage and dressing saturated lateral incision (thin bloody),  No odor --- NO erythema or sig swelling.    Vital Signs:       Patient Vitals for the past 8 hrs:   BP Temp Temp src Pulse Resp SpO2   05/15/24 1112 121/68 97.3 °F (36.3 °C) Oral 79 16 98 %   05/15/24 0945 139/74 98.1 °F (36.7 °C) Oral 79 16 --   05/15/24 0825 -- -- -- 82 18 98 %   05/15/24 0812 -- -- -- 79 18 98 %                                          Temp (24hrs), Av.7 °F (37.6 °C), Min:97.3 °F (36.3 °C), Max:111.2 °F (44 °C)      Labs:        Recent Labs     24  1747 05/15/24  1033   HCT 25.6* 23.8*   HGB 7.2* 6.8*   INR 1.2*  --      PT/OT:              ASSESSMENT / PLAN:   Principal Problem:    Unable to care for self  Resolved Problems:    * No resolved hospital problems. *     -  Dressing changed today with RN assistance --- daily change necessary with drainage   -  Heel floated in CAM boot  -  Continue PT/OT NWB on the LLE  -  DVT prophylaxis- SCD w/ Heparin(lorraine held)  -  DC planning - Placement pending    Signed By: NIKKI Stallings

## 2024-05-16 LAB
GLUCOSE BLD STRIP.AUTO-MCNC: 108 MG/DL (ref 65–117)
GLUCOSE BLD STRIP.AUTO-MCNC: 184 MG/DL (ref 65–117)
GLUCOSE BLD STRIP.AUTO-MCNC: 197 MG/DL (ref 65–117)
GLUCOSE BLD STRIP.AUTO-MCNC: 285 MG/DL (ref 65–117)
SERVICE CMNT-IMP: ABNORMAL
SERVICE CMNT-IMP: NORMAL

## 2024-05-16 PROCEDURE — 94640 AIRWAY INHALATION TREATMENT: CPT

## 2024-05-16 PROCEDURE — 6360000002 HC RX W HCPCS: Performed by: INTERNAL MEDICINE

## 2024-05-16 PROCEDURE — 6370000000 HC RX 637 (ALT 250 FOR IP): Performed by: INTERNAL MEDICINE

## 2024-05-16 PROCEDURE — 90935 HEMODIALYSIS ONE EVALUATION: CPT

## 2024-05-16 PROCEDURE — 82962 GLUCOSE BLOOD TEST: CPT

## 2024-05-16 PROCEDURE — 94760 N-INVAS EAR/PLS OXIMETRY 1: CPT

## 2024-05-16 PROCEDURE — 2700000000 HC OXYGEN THERAPY PER DAY

## 2024-05-16 PROCEDURE — 2580000003 HC RX 258: Performed by: INTERNAL MEDICINE

## 2024-05-16 PROCEDURE — 1100000003 HC PRIVATE W/ TELEMETRY

## 2024-05-16 PROCEDURE — 6370000000 HC RX 637 (ALT 250 FOR IP)

## 2024-05-16 RX ORDER — PREGABALIN 150 MG/1
150 CAPSULE ORAL
Status: DISCONTINUED | OUTPATIENT
Start: 2024-05-17 | End: 2024-05-18 | Stop reason: HOSPADM

## 2024-05-16 RX ADMIN — CARVEDILOL 25 MG: 12.5 TABLET, FILM COATED ORAL at 17:48

## 2024-05-16 RX ADMIN — BENZONATATE 100 MG: 100 CAPSULE ORAL at 08:30

## 2024-05-16 RX ADMIN — CARVEDILOL 25 MG: 12.5 TABLET, FILM COATED ORAL at 08:30

## 2024-05-16 RX ADMIN — Medication 1 CAPSULE: at 08:30

## 2024-05-16 RX ADMIN — DOXYCYCLINE HYCLATE 100 MG: 100 TABLET, COATED ORAL at 08:30

## 2024-05-16 RX ADMIN — PREGABALIN 150 MG: 150 CAPSULE ORAL at 08:30

## 2024-05-16 RX ADMIN — ACETAMINOPHEN 650 MG: 325 TABLET ORAL at 07:16

## 2024-05-16 RX ADMIN — PREDNISONE 40 MG: 20 TABLET ORAL at 08:30

## 2024-05-16 RX ADMIN — ARFORMOTEROL TARTRATE: 15 SOLUTION RESPIRATORY (INHALATION) at 08:50

## 2024-05-16 RX ADMIN — ASPIRIN 81 MG: 81 TABLET, CHEWABLE ORAL at 08:28

## 2024-05-16 RX ADMIN — SODIUM CHLORIDE, PRESERVATIVE FREE 10 ML: 5 INJECTION INTRAVENOUS at 21:10

## 2024-05-16 RX ADMIN — BENZONATATE 100 MG: 100 CAPSULE ORAL at 21:09

## 2024-05-16 RX ADMIN — PANTOPRAZOLE SODIUM 40 MG: 40 TABLET, DELAYED RELEASE ORAL at 06:07

## 2024-05-16 RX ADMIN — DOXYCYCLINE HYCLATE 100 MG: 100 TABLET, COATED ORAL at 21:09

## 2024-05-16 RX ADMIN — INSULIN HUMAN 12 UNITS: 100 INJECTION, SUSPENSION SUBCUTANEOUS at 08:30

## 2024-05-16 RX ADMIN — ARFORMOTEROL TARTRATE: 15 SOLUTION RESPIRATORY (INHALATION) at 19:39

## 2024-05-16 RX ADMIN — ISOSORBIDE MONONITRATE 60 MG: 30 TABLET, EXTENDED RELEASE ORAL at 08:30

## 2024-05-16 RX ADMIN — AMLODIPINE BESYLATE 5 MG: 5 TABLET ORAL at 08:30

## 2024-05-16 ASSESSMENT — PAIN DESCRIPTION - LOCATION: LOCATION: BACK

## 2024-05-16 ASSESSMENT — PAIN SCALES - GENERAL
PAINLEVEL_OUTOF10: 2
PAINLEVEL_OUTOF10: 0
PAINLEVEL_OUTOF10: 5
PAINLEVEL_OUTOF10: 0

## 2024-05-16 ASSESSMENT — PAIN DESCRIPTION - DESCRIPTORS: DESCRIPTORS: ACHING;DISCOMFORT

## 2024-05-16 ASSESSMENT — PAIN SCALES - WONG BAKER: WONGBAKER_NUMERICALRESPONSE: NO HURT

## 2024-05-16 NOTE — PROGRESS NOTES
Physician Progress Note      PATIENT:               NEDRA WEBER  [facility] #:                  [id number]  :                       1950  ADMIT DATE:       2024 10:52 AM  DISCH DATE:  RESPONDING  PROVIDER #:        Lisa Troy MD          QUERY TEXT:    Dear Attending physician,    Noted documentation in progress notes of 5/15- Acute metabolic encephalopathy   -fluctuating due to sundowning. Per the H&P the patient was A/O x 4.    In order to support the diagnosis of metabolic encephalopathy, please include   additional clinical indicators in your documentation.  Or please document if   the diagnosis of metabolic encephalopathy has been ruled out after further   study.    The medical record reflects the following:  Risk Factors: has HILARY, recent infection    Clinical Indicators:  Per H&P- PHYSICAL EXAM:Neurologic: No facial asymmetry. No aphasia or slurred   speech. Symmetrical strength, Sensation grossly intact. AAOx4.  Acute on chronic renal failure -ATN suspected  -per nephrology- Agitation and confusion overnight, now in restraints  Calm when I saw, oriented to birthday and place, Year \"\"-Per PN- Acute metabolic encephalopathy -fluctuating due to sundowning,   currently mental status stable with  at bedside today morning    Treatment: Telesitter, Haldol 2 mg IM given on , monitor neuro status      Thank you    Carol Carey RN, BSN, CRCR, CCDS  Clinical Documentation Improvement  [facility] phone # [phone] or via [employer]  Options provided:  -- metabolic encephalopathy, not POA present as evidenced by, Please document   evidence.  -- metabolic encephalopathy was ruled out  -- Other - I will add my own diagnosis  -- Disagree - Not applicable / Not valid  -- Disagree - Clinically unable to determine / Unknown  -- Refer to Clinical Documentation Reviewer    PROVIDER RESPONSE TEXT:    metabolic encephalopathy, not POA is present as evidenced by    Query created by:

## 2024-05-16 NOTE — PROGRESS NOTES
RENAL  PROGRESS NOTE             Assessment   :                                               Plan:  HILARY on CKD stage 3b  Anxiety/agitation- better   Anemia  COPD  CHF with AICD  Afib w/ watchman  Left ankle fracture with infection  Recurrent uti Creatinine trend last 3 days 3.2 => 3.34 => 3.41 => pending  She has been voiding well though the urine output is not recorded.  She will be going to [place] soon.  Will attempt dialysis today.  Will suggest watch renal function closely at [place] for recovery.  Discussed this with patient who agrees  Discussed this with spouse who agrees  -hemodialysis stopped after patient stated that she did not want dialysis.  HD initiated , HD MWF for now - short HD today; ATN; Upc ratio 2.2; No hydro     added amlodipine 5 mg daily on      Hgb < 10; continue retacrit     perm cath  and   outpatient HD at [facility]                   Subjective:   Seen and examined.  Not in any distress  No complaints .      Objective:   VITALS SIGNS:    BP (!) 142/86   Pulse 78   Temp 97.9 °F (36.6 °C)   Resp 20   Ht 1.753 m (5' 9\")   Wt 108.9 kg (240 lb)   SpO2 100%   BMI 35.44 kg/m²             Temp (24hrs), Av.7 °F (36.5 °C), Min:97.2 °F (36.2 °C), Max:98.1 °F (36.7 °C)         PHYSICAL EXAM:    Obese, pleasant, alert oriented x 3  No respiratory distress  Nonlabored respiration  Mild edema  Nontunneled hemodialysis access R  side  Bruising frequent  Coherent  DATA REVIEW:     INTAKE / OUTPUT:   Last shift:      No intake/output data recorded.  Last 3 shifts:  190 -  0700  In: -   Out: 450 [Urine:450]    Intake/Output Summary (Last 24 hours) at 2024 0911  Last data filed at 2024 0108  Gross per 24 hour   Intake --   Output 450 ml   Net -450 ml           LABS:   LABS:  Recent Labs     05/15/24  1033 24  1747 24  0815 24  1240 05/10/24  0309 24  0020    139 143 141 138 136   K 3.9 4.4 3.6 3.7 4.2 4.1    106 108 106  105 104   CO2 30 29 29 32 28 29   BUN 52* 53* 56* 41* 47* 32*   CREATININE 3.41* 3.34* 3.20* 2.42* 2.67* 1.89*   CALCIUM 8.4* 8.6 8.6 8.3* 8.5 7.8*   PHOS 3.8  --  3.4  --  4.2 3.2   MG  --   --   --  1.7 1.9 1.9     Recent Labs     05/15/24  1033 05/14/24  1747 05/13/24  0815   WBC 5.2 5.7 6.7   HGB 6.8* 7.2* 7.4*   HCT 23.8* 25.6* 25.3*   PLT 65* 77* 67*     No results for input(s): \"KU\", \"CLU\", \"CREAU\" in the last 720 hours.    Invalid input(s): \"ALEJANDRA\", \"PROU\"

## 2024-05-16 NOTE — PROGRESS NOTES
Hospitalist Progress Note    NAME:   Marilee Oakes   : 1950   MRN: [id number]     Date: 2024    Patient PCP: Mariela Marino MD    Estimated discharge date: ?    Barriers: SNF placement, [facility] being arranged, Nephro clearance, Off telesitter >24 hrs    Hospital Problem list:     Recent MRSA wound infection POA  Left ankle fracture s/p ORIF 2024  MRSA bacteremia with new fevers 4/3/2024 - 2024 discharged on daptomycin  MRSA left ankle infection s/p hardware removal -5/10/2024 discharged on PO doxycycline  Family unable to care for patient at home, SNF recommended last admit  Patient went home with home health despite SNF recommendations  Return back to ED last night and then again today morning with complaints of left ankle pain   Feeling of something popped like a stitch in the ankle  Inpatient PT OT eval for DC planning-if recommended last admission  Inpatient case management consult for SNF arrangement  Inpatient Ortho consult for evaluation of the left ankle sutures/wound  Last admission Ortho recommended   Continue PT/OT with NWB LLE in boot  Further surgical plans for left ankle will be established in the future once infection is cleared   Ortho was okay to remove boot for daily dressing changes.   Otherwise, must remain in boot.  Stitches were supposed to stay until 3 weeks post op .  Doxycycline  Orthopedics seen the patient-recommends nonweightbearing on left lower extremity, SNF/rehab this time   concerned re UTI, repeat UA with no pyuria or bactiuria    Acute metabolic encephalopathy -fluctuating due to sundowning, currently mental status stable with  at bedside today morning  Acute delirium recurrent  Agitated and confused at times  Similar confusion documented last admit, ? Hospital delirium likely  CT head negative last admit  Reduced lyrica from bid to qhs last admit  PRN haldol and hydroxyzine  Off telemetry sitter since 5/15 AM    New  ml   Net -450 ml          I had a face to face encounter and independently examined this patient on 5/16/2024, as outlined below:    PHYSICAL EXAM:  General: Alert, mildly agitated  EENT:  Anicteric sclerae.  Resp:  CTA bilaterally, no wheezing or rales.  No accessory muscle use  CV:  Regular  rhythm,  No edema  GI:  Soft, Non distended, Non tender.  +Bowel sounds  Neurologic:  Alert and oriented X 2, normal speech,   Psych:    Not anxious nor agitated  Skin:  No rashes.  No jaundice     Reviewed most current lab test results and cultures  YES  Reviewed most current radiology test results   YES  Review and summation of old records today    NO  Reviewed patient's current orders and MAR    YES  PMH/SH reviewed - no change compared to H&P    ________________________________________________________________________        Comments   >50% of visit spent in counseling and coordination of care     ________________________________________________________________________  Lisa Troy MD     Procedures: see electronic medical records for all procedures/Xrays and details which were not copied into this note but were reviewed prior to creation of Plan.      LABS:  I reviewed today's most current labs and imaging studies.  Pertinent labs include:  Recent Labs     05/14/24  1747 05/15/24  1033   WBC 5.7 5.2   HGB 7.2* 6.8*   HCT 25.6* 23.8*   PLT 77* 65*       Recent Labs     05/14/24  1747 05/15/24  1033    144   K 4.4 3.9    108   CO2 29 30   GLUCOSE 235* 132*   BUN 53* 52*   CREATININE 3.34* 3.41*   CALCIUM 8.6 8.4*   PHOS  --  3.8   BILITOT 0.8  --    AST 14*  --    ALT 17  --    INR 1.2*  --          Signed: Lisa Troy MD

## 2024-05-16 NOTE — PLAN OF CARE
Problem: Safety - Adult  Goal: Free from fall injury  5/16/2024 1049 by Araceli Maki LPN  Outcome: Progressing  Flowsheets (Taken 5/16/2024 0738)  Free From Fall Injury: Instruct family/caregiver on patient safety  5/16/2024 0010 by Inrda Vieira RN  Outcome: Progressing     Problem: Chronic Conditions and Co-morbidities  Goal: Patient's chronic conditions and co-morbidity symptoms are monitored and maintained or improved  5/16/2024 1049 by Araceli Maki LPN  Outcome: Progressing  Flowsheets (Taken 5/16/2024 0738)  Care Plan - Patient's Chronic Conditions and Co-Morbidity Symptoms are Monitored and Maintained or Improved:   Monitor and assess patient's chronic conditions and comorbid symptoms for stability, deterioration, or improvement   Collaborate with multidisciplinary team to address chronic and comorbid conditions and prevent exacerbation or deterioration   Update acute care plan with appropriate goals if chronic or comorbid symptoms are exacerbated and prevent overall improvement and discharge  5/16/2024 0010 by Indra Vieira RN  Outcome: Progressing     Problem: Respiratory - Adult  Goal: Achieves optimal ventilation and oxygenation  5/16/2024 1049 by Araceli Maki LPN  Outcome: Progressing  Flowsheets (Taken 5/16/2024 0738)  Achieves optimal ventilation and oxygenation:   Assess for changes in respiratory status   Assess for changes in mentation and behavior   Position to facilitate oxygenation and minimize respiratory effort   Oxygen supplementation based on oxygen saturation or arterial blood gases   Encourage broncho-pulmonary hygiene including cough, deep breathe, incentive spirometry   Assess the need for suctioning and aspirate as needed   Assess and instruct to report shortness of breath or any respiratory difficulty   Respiratory therapy support as indicated  5/16/2024 0010 by Indra Vieira RN  Outcome: Progressing  5/15/2024 2059 by Bella Rudd RCP  Outcome: Progressing      Problem: Safety - Medical Restraint  Goal: Remains free of injury from restraints (Restraint for Interference with Medical Device)  Description: INTERVENTIONS:  1. Determine that other, less restrictive measures have been tried or would not be effective before applying the restraint  2. Evaluate the patient's condition at the time of restraint application  3. Inform patient/family regarding the reason for restraint  4. Q2H: Monitor safety, psychosocial status, comfort, nutrition and hydration  5/16/2024 1049 by Araceli Maki LPN  Outcome: Progressing  5/16/2024 0010 by Indra Vieira, RN  Outcome: Progressing     Problem: Pain  Goal: Verbalizes/displays adequate comfort level or baseline comfort level  5/16/2024 1049 by Araceli Maki LPN  Outcome: Progressing  5/16/2024 0010 by Indra Vieira, RN  Outcome: Progressing     Problem: Skin/Tissue Integrity  Goal: Absence of new skin breakdown  Description: 1.  Monitor for areas of redness and/or skin breakdown  2.  Assess vascular access sites hourly  3.  Every 4-6 hours minimum:  Change oxygen saturation probe site  4.  Every 4-6 hours:  If on nasal continuous positive airway pressure, respiratory therapy assess nares and determine need for appliance change or resting period.  Outcome: Progressing

## 2024-05-16 NOTE — PLAN OF CARE
Problem: Safety - Adult  Goal: Free from fall injury  5/16/2024 0010 by Indra Vieira RN  Outcome: Progressing  5/15/2024 1251 by Esther Sierra RN  Outcome: Adequate for Discharge  5/15/2024 1240 by Esther Sierra RN  Outcome: Adequate for Discharge  Flowsheets (Taken 5/15/2024 0980)  Free From Fall Injury: Instruct family/caregiver on patient safety     Problem: Chronic Conditions and Co-morbidities  Goal: Patient's chronic conditions and co-morbidity symptoms are monitored and maintained or improved  5/16/2024 0010 by Indra Vieira RN  Outcome: Progressing  5/15/2024 1251 by Esther Sierra RN  Outcome: Adequate for Discharge  5/15/2024 1240 by Esther Sierra RN  Outcome: Adequate for Discharge     Problem: Respiratory - Adult  Goal: Achieves optimal ventilation and oxygenation  5/16/2024 0010 by Indra Vieira RN  Outcome: Progressing  5/15/2024 2059 by Bella Rudd RCP  Outcome: Progressing  5/15/2024 1251 by Esther Sierra RN  Outcome: Adequate for Discharge  5/15/2024 1240 by Esther Sierra RN  Outcome: Adequate for Discharge     Problem: Safety - Medical Restraint  Goal: Remains free of injury from restraints (Restraint for Interference with Medical Device)  Description: INTERVENTIONS:  1. Determine that other, less restrictive measures have been tried or would not be effective before applying the restraint  2. Evaluate the patient's condition at the time of restraint application  3. Inform patient/family regarding the reason for restraint  4. Q2H: Monitor safety, psychosocial status, comfort, nutrition and hydration  5/16/2024 0010 by Indra Vieira RN  Outcome: Progressing  5/15/2024 1251 by Esther Sierra RN  Outcome: Adequate for Discharge  5/15/2024 1240 by Esther Sierra RN  Outcome: Adequate for Discharge     Problem: Pain  Goal: Verbalizes/displays adequate comfort level or baseline comfort level  5/16/2024 0010 by Indra Vieira RN  Outcome: Progressing  5/15/2024 1251 by

## 2024-05-16 NOTE — PROGRESS NOTES
ORTHO - Progress Note      Marilee Oakes     [id number]  female    73 y.o.    1950    Admit date:2024    SUBJECTIVE:     Marilee Oakes is a 73 y.o. female resting in the bed.     at bedside.    OBJECTIVE:       Physical Exam:  General: sleeping , cooperative, no distress.   Gastrointestinal:  non-distended .    Cardiovascular:  Brisk cap refill in all distal extremities   Genitourinary: Voiding independently   Respiratory: No respiratory distress    Musculoskeletal: Phillip's sign negative in bilateral lower extremities.  Calves soft, supple, non-tender upon palpation or with passive stretch.    Dressing/Wound:  bandage and dressing saturated lateral incision  scant drainage (thin bloody),  No odor --- NO erythema or sig swelling.    Vital Signs:       Patient Vitals for the past 8 hrs:   BP Temp Temp src Pulse Resp SpO2   24 1315 130/75 -- -- 81 -- --   24 1300 130/66 98.4 °F (36.9 °C) -- 80 20 --   24 0900 -- -- -- 80 20 100 %   24 0850 -- -- -- 78 20 100 %   24 0738 (!) 142/86 97.9 °F (36.6 °C) Oral 80 16 100 %   24 0716 -- -- -- -- 16 --                                            Temp (24hrs), Av.9 °F (36.6 °C), Min:97.2 °F (36.2 °C), Max:98.4 °F (36.9 °C)    Date 24 0000 - 24 2359   Shift 5426-6435 1143-1464 3808-5434 24 Hour Total   INTAKE   Shift Total(mL/kg)       OUTPUT   Urine(mL/kg/hr) 450(0.5)   450   Shift Total(mL/kg) 450(4.1)   450(4.1)   Weight (kg) 108.9 108.9 108.9 108.9     Labs:        Recent Labs     24  1747 05/15/24  1033   HCT 25.6* 23.8*   HGB 7.2* 6.8*   INR 1.2*  --        PT/OT:              ASSESSMENT / PLAN:   Principal Problem:    Unable to care for self  Resolved Problems:    * No resolved hospital problems. *     -  Dressing changed today with RN assistance --- daily change necessary with drainage   -  Heel floated in CAM boot  -  Continue PT/OT NWB on the LLE  -  DVT prophylaxis- SCD w/ Heparin(lorraine held)  -   DC planning - Placement pending    Signed By: Gt Brown PA-C

## 2024-05-16 NOTE — FLOWSHEET NOTE
Primary RN SBAR: Araceli Maki, AGNES  Patient Education provided: HD treatment  Preferred Education method and Primary language: Verbal, English  Hospital associated wait time; reason: 60 min wait for transport  Hepatitis B Surface Ag   Date/Time Value Ref Range Status   05/02/2024 05:36 PM <0.10 Index Final     Hep B S Ab   Date/Time Value Ref Range Status   05/02/2024 05:36 PM <3.10 mIU/mL Final        05/16/24 1300   Vital Signs   /66   Temp 98.4 °F (36.9 °C)   Pulse 80   Respirations 20   Pain Assessment   Pain Assessment None - Denies Pain   Treatment   Time On 1300   Treatment Goal 1000 ml   Observations & Evaluations   Level of Consciousness 0   Oriented X 3   Heart Rhythm Regular   Respiratory Quality/Effort Unlabored   O2 Device Nasal cannula   Bilateral Breath Sounds Diminished   Skin Color Ecchymosis (comment);Pale   Skin Condition/Temp Dry;Warm   Appetite Good   Abdomen Inspection Obese;Soft   Bowel Sounds (All Quadrants) Active   Edema Right lower extremity;Left lower extremity   RLE Edema +1;+2   LLE Edema +1;+2   Technical Checks   Dialysis Machine No. 10   RO Machine Number R10   Dialyzer Lot No. [id number]   Tubing Lot Number [id number]   All Connections Secure Yes   NS Bag Yes   Saline Line Double Clamped Yes   Dialyzer Revaclear 300   Prime Volume (mL) 200 mL   ICEBOAT I;C;E;B;O;A;T   RO Machine Log Sheet Completed Yes   Machine Alarm Self Test Passed;Completed   Air Foam Detector Tested;Proper Function   Extracorporeal Circuit Tested for Integrity Yes   Machine Conductivity 13.9   Manual Ph 7.2   Bleach Test (Neg) Yes   Bath Temperature 98.6 °F (37 °C)   Dialysis Bath   K+ (Potassium) 4   Ca+ (Calcium) 2.5   Na+ (Sodium) 138   HCO3 (Bicarb) 35   Treatment Initiation   Dialyze Hours 3.5   Treatment  Initiation Universal Precautions maintained;Lines secured to patient;Connections secured;Prime given;Venous Parameters set;Arterial Parameters set;Air foam detector engaged;Saline line double

## 2024-05-16 NOTE — FLOWSHEET NOTE
05/16/24 1530   Vital Signs   BP (!) 150/80   Temp 97.7 °F (36.5 °C)   Pulse 79   Respirations 20   Pain Assessment   Pain Assessment None - Denies Pain   Post-Hemodialysis Assessment   Post-Treatment Procedures Blood returned;Catheter Capped, clamped with Saline x2 ports   Machine Disinfection Process Acid/Vinegar Clean;Heat Disinfect;Exterior Machine Disinfection   Rinseback Volume (ml) 300 ml   Blood Volume Processed (Liters) 50.5 L   Dialyzer Clearance Lightly streaked   Duration of Treatment (minutes) 150 minutes   Hemodialysis Intake (ml) 500 ml   Hemodialysis Output (ml) 1500 ml   NET Removed (ml) 1000   Tolerated Treatment Good   Bilateral Breath Sounds Diminished   Edema Right lower extremity;Left lower extremity   RLE Edema +1;+2   LLE Edema +1;+2   Time Off 1530   Patient Disposition Return to room   Observations & Evaluations   Level of Consciousness 0   Oriented X 3   Heart Rhythm Regular   Respiratory Quality/Effort Unlabored   O2 Device Nasal cannula   Skin Color Ecchymosis (comment);Pale   Skin Condition/Temp Dry;Fragile   Appetite Good   Abdomen Inspection Obese;Soft   Bowel Sounds (All Quadrants) Active     Primary RN SBAR: Araceli Maki RN  Comments: Pt tolerated treatment well without any complaints or complications.

## 2024-05-16 NOTE — PROGRESS NOTES
End of Shift Note    Bedside shift change report given to Araceli EMERSON (oncoming nurse) by Indra Vieira RN (offgoing nurse).  Report included the following information SBAR, Kardex, Procedure Summary, Intake/Output, MAR, and Recent Results    Shift worked:  Nights       Shift summary and any significant changes:     none     Concerns for physician to address:  none     Zone phone for oncoming shift:   [phone]       Activity:     Number times ambulated in hallways past shift: 0  Number of times OOB to chair past shift: 0    Cardiac:   Cardiac Monitoring: No           Access:  Current line(s): PIV and HD access     Genitourinary:   Urinary status: voiding and external catheter    Respiratory:      Chronic home O2 use?: YES  Incentive spirometer at bedside: YES       GI:     Current diet:  ADULT DIET; Regular; 4 carb choices (60 gm/meal); Low Fat/Low Chol/High Fiber/2 gm Na  ADULT ORAL NUTRITION SUPPLEMENT; Breakfast, Lunch, Dinner; Renal Oral Supplement  Passing flatus: YES  Tolerating current diet: YES       Pain Management:   Patient states pain is manageable on current regimen: YES    Skin:     Interventions: float heels and increase time out of bed    Patient Safety:  Fall Score:    Interventions: bed/chair alarm and gripper socks       Length of Stay:  Expected LOS: 5  Actual LOS: 5      Indra Vieira RN

## 2024-05-16 NOTE — CARE COORDINATION
[facility] has bed and dialysis place available for pt when medically cleared for discharge. Nephro MD did not request discharge today as pt was being monitored for potential improvements in renal function for possible reduction or other change to dialysis.    Corie Espino, [facility] [place]  Care Management  [phone]

## 2024-05-17 LAB
BASOPHILS # BLD: 0 K/UL (ref 0–0.1)
BASOPHILS NFR BLD: 0 % (ref 0–1)
DIFFERENTIAL METHOD BLD: ABNORMAL
EOSINOPHIL # BLD: 0 K/UL (ref 0–0.4)
EOSINOPHIL NFR BLD: 1 % (ref 0–7)
ERYTHROCYTE [DISTWIDTH] IN BLOOD BY AUTOMATED COUNT: 18.8 % (ref 11.5–14.5)
GLUCOSE BLD STRIP.AUTO-MCNC: 114 MG/DL (ref 65–117)
GLUCOSE BLD STRIP.AUTO-MCNC: 155 MG/DL (ref 65–117)
GLUCOSE BLD STRIP.AUTO-MCNC: 245 MG/DL (ref 65–117)
GLUCOSE BLD STRIP.AUTO-MCNC: 89 MG/DL (ref 65–117)
HCT VFR BLD AUTO: 25.6 % (ref 35–47)
HGB BLD-MCNC: 7.2 G/DL (ref 11.5–16)
IMM GRANULOCYTES # BLD AUTO: 0 K/UL (ref 0–0.04)
IMM GRANULOCYTES NFR BLD AUTO: 1 % (ref 0–0.5)
LYMPHOCYTES # BLD: 0.9 K/UL (ref 0.8–3.5)
LYMPHOCYTES NFR BLD: 19 % (ref 12–49)
MCH RBC QN AUTO: 24.7 PG (ref 26–34)
MCHC RBC AUTO-ENTMCNC: 28.1 G/DL (ref 30–36.5)
MCV RBC AUTO: 87.7 FL (ref 80–99)
MONOCYTES # BLD: 0.4 K/UL (ref 0–1)
MONOCYTES NFR BLD: 9 % (ref 5–13)
NEUTS SEG # BLD: 3.1 K/UL (ref 1.8–8)
NEUTS SEG NFR BLD: 70 % (ref 32–75)
NRBC # BLD: 0.02 K/UL (ref 0–0.01)
NRBC BLD-RTO: 0.4 PER 100 WBC
PLATELET # BLD AUTO: 77 K/UL (ref 150–400)
RBC # BLD AUTO: 2.92 M/UL (ref 3.8–5.2)
SERVICE CMNT-IMP: ABNORMAL
SERVICE CMNT-IMP: ABNORMAL
SERVICE CMNT-IMP: NORMAL
SERVICE CMNT-IMP: NORMAL
WBC # BLD AUTO: 4.5 K/UL (ref 3.6–11)

## 2024-05-17 PROCEDURE — 85025 COMPLETE CBC W/AUTO DIFF WBC: CPT

## 2024-05-17 PROCEDURE — 2700000000 HC OXYGEN THERAPY PER DAY

## 2024-05-17 PROCEDURE — 99231 SBSQ HOSP IP/OBS SF/LOW 25: CPT

## 2024-05-17 PROCEDURE — 6370000000 HC RX 637 (ALT 250 FOR IP)

## 2024-05-17 PROCEDURE — 94640 AIRWAY INHALATION TREATMENT: CPT

## 2024-05-17 PROCEDURE — 82962 GLUCOSE BLOOD TEST: CPT

## 2024-05-17 PROCEDURE — 1100000003 HC PRIVATE W/ TELEMETRY

## 2024-05-17 PROCEDURE — 6370000000 HC RX 637 (ALT 250 FOR IP): Performed by: HOSPITALIST

## 2024-05-17 PROCEDURE — 6370000000 HC RX 637 (ALT 250 FOR IP): Performed by: INTERNAL MEDICINE

## 2024-05-17 PROCEDURE — 94761 N-INVAS EAR/PLS OXIMETRY MLT: CPT

## 2024-05-17 PROCEDURE — 2580000003 HC RX 258: Performed by: INTERNAL MEDICINE

## 2024-05-17 PROCEDURE — 6360000002 HC RX W HCPCS: Performed by: INTERNAL MEDICINE

## 2024-05-17 RX ORDER — LANOLIN ALCOHOL/MO/W.PET/CERES
6 CREAM (GRAM) TOPICAL NIGHTLY PRN
Status: DISCONTINUED | OUTPATIENT
Start: 2024-05-17 | End: 2024-05-18 | Stop reason: HOSPADM

## 2024-05-17 RX ORDER — BUMETANIDE 1 MG/1
2 TABLET ORAL DAILY
COMMUNITY

## 2024-05-17 RX ORDER — HYDROXYZINE HYDROCHLORIDE 25 MG/1
25 TABLET, FILM COATED ORAL ONCE
Status: DISCONTINUED | OUTPATIENT
Start: 2024-05-17 | End: 2024-05-18 | Stop reason: HOSPADM

## 2024-05-17 RX ORDER — BUMETANIDE 1 MG/1
2 TABLET ORAL DAILY
Status: DISCONTINUED | OUTPATIENT
Start: 2024-05-18 | End: 2024-05-18 | Stop reason: HOSPADM

## 2024-05-17 RX ADMIN — ACETAMINOPHEN 650 MG: 325 TABLET ORAL at 22:38

## 2024-05-17 RX ADMIN — ASPIRIN 81 MG: 81 TABLET, CHEWABLE ORAL at 08:56

## 2024-05-17 RX ADMIN — ARFORMOTEROL TARTRATE: 15 SOLUTION RESPIRATORY (INHALATION) at 08:22

## 2024-05-17 RX ADMIN — HYDROXYZINE HYDROCHLORIDE 25 MG: 25 TABLET, FILM COATED ORAL at 00:03

## 2024-05-17 RX ADMIN — DOXYCYCLINE HYCLATE 100 MG: 100 TABLET, COATED ORAL at 08:56

## 2024-05-17 RX ADMIN — PANTOPRAZOLE SODIUM 40 MG: 40 TABLET, DELAYED RELEASE ORAL at 08:56

## 2024-05-17 RX ADMIN — AMLODIPINE BESYLATE 5 MG: 5 TABLET ORAL at 08:55

## 2024-05-17 RX ADMIN — BENZONATATE 100 MG: 100 CAPSULE ORAL at 08:55

## 2024-05-17 RX ADMIN — BENZONATATE 100 MG: 100 CAPSULE ORAL at 20:53

## 2024-05-17 RX ADMIN — PREDNISONE 40 MG: 20 TABLET ORAL at 08:55

## 2024-05-17 RX ADMIN — INSULIN HUMAN 10 UNITS: 100 INJECTION, SUSPENSION SUBCUTANEOUS at 08:56

## 2024-05-17 RX ADMIN — SODIUM CHLORIDE, PRESERVATIVE FREE 10 ML: 5 INJECTION INTRAVENOUS at 20:55

## 2024-05-17 RX ADMIN — ISOSORBIDE MONONITRATE 60 MG: 30 TABLET, EXTENDED RELEASE ORAL at 08:56

## 2024-05-17 RX ADMIN — PREGABALIN 150 MG: 150 CAPSULE ORAL at 20:53

## 2024-05-17 RX ADMIN — ARFORMOTEROL TARTRATE: 15 SOLUTION RESPIRATORY (INHALATION) at 19:29

## 2024-05-17 RX ADMIN — MELATONIN 6 MG: at 22:33

## 2024-05-17 RX ADMIN — Medication 1 CAPSULE: at 08:55

## 2024-05-17 RX ADMIN — CARVEDILOL 25 MG: 12.5 TABLET, FILM COATED ORAL at 16:11

## 2024-05-17 RX ADMIN — MELATONIN 6 MG: at 00:56

## 2024-05-17 RX ADMIN — BENZONATATE 100 MG: 100 CAPSULE ORAL at 15:31

## 2024-05-17 RX ADMIN — CARVEDILOL 25 MG: 12.5 TABLET, FILM COATED ORAL at 08:55

## 2024-05-17 RX ADMIN — ACETAMINOPHEN 650 MG: 325 TABLET ORAL at 04:00

## 2024-05-17 RX ADMIN — DOXYCYCLINE HYCLATE 100 MG: 100 TABLET, COATED ORAL at 20:53

## 2024-05-17 ASSESSMENT — PAIN SCALES - GENERAL
PAINLEVEL_OUTOF10: 3
PAINLEVEL_OUTOF10: 10
PAINLEVEL_OUTOF10: 0
PAINLEVEL_OUTOF10: 3

## 2024-05-17 ASSESSMENT — PAIN - FUNCTIONAL ASSESSMENT: PAIN_FUNCTIONAL_ASSESSMENT: PREVENTS OR INTERFERES SOME ACTIVE ACTIVITIES AND ADLS

## 2024-05-17 ASSESSMENT — PAIN DESCRIPTION - ORIENTATION
ORIENTATION: LOWER
ORIENTATION: MID;LOWER

## 2024-05-17 ASSESSMENT — PAIN DESCRIPTION - LOCATION
LOCATION: BACK
LOCATION: BACK

## 2024-05-17 ASSESSMENT — PAIN DESCRIPTION - DESCRIPTORS
DESCRIPTORS: ACHING
DESCRIPTORS: ACHING

## 2024-05-17 NOTE — PROGRESS NOTES
RENAL  PROGRESS NOTE             Assessment   :                                               Plan:  HILARY on CKD stage 3b  Anxiety/agitation- better   Anemia  COPD  CHF with AICD  Afib w/ watchman  Left ankle fracture with infection  Recurrent uti HILARY needing PRN dialysis  Creatinine trending around 3.4.  Labs tomorrow a.m.    -3.5-hour  dialysis treatment-1 L fluid removed  -hemodialysis stopped after patient stated that she did not want dialysis.  HD initiated , HD MWF for now - short HD today; ATN; Upc ratio 2.2; No hydro     added amlodipine 5 mg daily on      Hgb < 10; continue retacrit     perm cath  and   outpatient HD at [facility]                   Subjective:   Seen and examined.  Not in any distress  No complaints .      Objective:   VITALS SIGNS:    BP (!) 142/89   Pulse 93   Temp 97.5 °F (36.4 °C)   Resp 16   Ht 1.753 m (5' 9\")   Wt 108.9 kg (240 lb)   SpO2 100%   BMI 35.44 kg/m²             Temp (24hrs), Av.8 °F (36.6 °C), Min:97.5 °F (36.4 °C), Max:98.2 °F (36.8 °C)         PHYSICAL EXAM:    Obese, pleasant, alert oriented x 3  No respiratory distress  Nonlabored respiration  Mild edema  Nontunneled hemodialysis access R  side  Bruising frequent  Coherent  DATA REVIEW:     INTAKE / OUTPUT:   Last shift:      No intake/output data recorded.  Last 3 shifts: 05/15 1901 -  0700  In: 500   Out: 1950 [Urine:450]  No intake or output data in the 24 hours ending 24 1616          LABS:   LABS:  Recent Labs     05/15/24  1033 24  1747 24  0815 24  1240 05/10/24  0309 24  0020    139 143 141 138 136   K 3.9 4.4 3.6 3.7 4.2 4.1    106 108 106 105 104   CO2 30 29 29 32 28 29   BUN 52* 53* 56* 41* 47* 32*   CREATININE 3.41* 3.34* 3.20* 2.42* 2.67* 1.89*   CALCIUM 8.4* 8.6 8.6 8.3* 8.5 7.8*   PHOS 3.8  --  3.4  --  4.2 3.2   MG  --   --   --  1.7 1.9 1.9     Recent Labs     24  1025 05/15/24  1033 24  1747   WBC 4.5 5.2 5.7   HGB  7.2* 6.8* 7.2*   HCT 25.6* 23.8* 25.6*   PLT 77* 65* 77*     No results for input(s): \"KU\", \"CLU\", \"CREAU\" in the last 720 hours.    Invalid input(s): \"ALEJANDRA\", \"PROU\"

## 2024-05-17 NOTE — PROGRESS NOTES
Hospitalist Progress Note    NAME:   Marilee Oakes   : 1950   MRN: [id number]     Date: 2024    Patient PCP: Mariela Marino MD    Estimated discharge date: - pending plt and hbg improvement      Hospital Problem list:     Recent MRSA wound infection POA  Left ankle fracture s/p ORIF 2024  MRSA bacteremia with new fevers 4/3/2024 - 2024 discharged on daptomycin  MRSA left ankle infection s/p hardware removal -5/10/2024 discharged on PO doxycycline  Family unable to care for patient at home, SNF recommended last admit  Patient went home with home health despite SNF recommendations  Return back to ED last night and then again today morning with complaints of left ankle pain   Feeling of something popped like a stitch in the ankle  Inpatient PT OT eval for DC planning-if recommended last admission  Inpatient case management consult for SNF arrangement  Inpatient Ortho consult for evaluation of the left ankle sutures/wound  Last admission Ortho recommended   Continue PT/OT with NWB LLE in boot  Further surgical plans for left ankle will be established in the future once infection is cleared   Ortho was okay to remove boot for daily dressing changes.   Otherwise, must remain in boot.  Stitches were supposed to stay until 3 weeks post op .  Doxycycline  Orthopedics seen the patient-recommends nonweightbearing on left lower extremity, SNF/rehab this time   concerned re UTI, repeat UA with no pyuria or bactiuria    Acute metabolic encephalopathy -fluctuating due to sundowning, currently mental status stable with  at bedside today     Acute delirium recurrent  Agitated and confused at times  Similar confusion documented last admit, ? Hospital delirium likely  CT head negative last admit  educed lyrica from bid to qhs last admit  PRN haldol and hydroxyzine  Off telemetry sitter since 5/15 AM    New thrombocytopenia PLTs slightly down to 65 K  Plts normal beginning

## 2024-05-17 NOTE — DIABETES MGMT
[facility]  DIABETES MANAGEMENT CONSULT    Consulted by Provider for advanced nursing evaluation and care for inpatient blood glucose management.    Evaluation and Action Plan   Marilee Oakes is a 73 year old female patient with diet controlled Type 2 diabetes. Her current A1c is 5.8%. The patient took Metformin in the past but is was d/c'd by her PCP since BG's have been very well controlled. The patient's admission BG was 115.  She has since started on methylprednisolone and BG's are steadily rising. Infection and steroid use are likely primary culprits of BG elevation. The patient will require insulin to help override effects of the steroid. I suspect she will not require insulin once steroid dosing is complete.   BG's are stable on the current regimen. The patient reports having a dialysis treatment yesterday without complications. The patient does look better today. I will continue to use the NPH to override the effects of the steroid. I suspect the patient will not require insulin once steroid dosing is complete.   Steroid dosing continues although it has been decreased.  this morning. I have slightly decreased the insulin linked with the steroid.   Bg's remain stable. The steroid has been further tapered. I have decreased the NPH dose as well. The patient will likely not require insulin was the steroid is stopped. ASAEL for IR procedure (port for dialysis).   The patient uses daily prednisone. The basal dose was increased yesterday. However BG was 64. Insulin dose reduced for this morning. The patient is eating well with a pleasant mood today.   Bg's below goal this morning. Steroid continues. I have decreased the NPH used to override the steroids.       Management Rationale Action Plan   Medication   Corrective insulin Using medium dose sensitivity based on weight    Overriding steroid effect Using 0.1 units/kg/D based on weight Use 10 units NPH  with  40 mg  ratio <30 mg/g (HCC)     Essential hypertension     GERD (gastroesophageal reflux disease)     History of diverticulitis     diverticulitis    History of echocardiogram 11/2021    LV: Estimated LVEF is 40 - 45%. Visually measured ejection fraction. Normal cavity size and wall thickness. Globally reduced systolic function.  LA: Moderately dilated left atrium. Left atrial appendage is completely occluded. A Watchman left atrial appendage occluder is present and completely occludes the appendage.    History of migraine     Hypercholesterolemia 01/22/2018    Irritable bowel syndrome     IBS, spinal stenosis    Long-term use of high-risk medication 01/22/2018    Lumbar spinal stenosis     Morbid (severe) obesity due to excess calories ([facility])     Neuropathy     Obesity (BMI 30-39.9) 04/30/2019    Obstructive sleep apnea     uses CPAP    Permanent atrial fibrillation ([facility])     Presence of implantable cardioverter-defibrillator (ICD)     Presence of Watchman left atrial appendage closure device     Type 2 diabetes mellitus with diabetic neuropathy, without long-term current use of insulin ([facility]) 06/05/2016    Venous insufficiency     Vitamin B12 deficiency         INITIAL DX: Drainage from wound [T14.8XXA]  Infection of postoperative wound due to methicillin-resistant Staphylococcus aureus [T81.49XA, B95.62]  Unable to care for self [Z78.9]     Current Treatment     TX: Abx. Cardiac management. ASA. Steroid. Protonix. Lyrica.     Hospital Course   Clinical progress has been complicated by infection and steroid induced hyperglycemia.     Diabetes History   The patient has a hx of diet controlled Type 2 diabetes. She resides with her spouse of 53 years. The patient was most recently at inpatient rehab ( [facility]) after suffering an ankle fracture in in March.     Diabetes-related Medical History    Neurological complications  Peripheral neuropathy  Microvascular disease  Nephropathy  Macrovascular disease  CAD  Other

## 2024-05-17 NOTE — CARE COORDINATION
Pt has bed available at [facility], is set up with HD den. Platelets and hgb low, hospitalist addressing these lab values prior to clearing pt medically for discharge. Transport packet in chart folder in case of weekend discharge. [facility] admissions team aware of potential weekend admission.    Corie Espino [facility]  Care Management  [phone]

## 2024-05-17 NOTE — PROGRESS NOTES
End of Shift Note    Bedside shift change report given to Chantel (oncoming nurse) by Idalia Sandoval RN (offgoing nurse).  Report included the following information SBAR, Kardex, and MAR    Shift worked:  7p-7a     Shift summary and any significant changes:     Humalog was held due to the patients blood glucose level, see MAR.  Scheduled medications were given, see MAR.  IV has been flushed and is patent.  Patient was given Atarax and Tylenol once each respectively, see PRN MAR.  Patient requested a sleep-aid, Dr. Christensen was notified and order was received for Melatonin which was administered, see MAR.  Patient was repositioned during my shift.  Patient teaching and routine rounding has been done.     Concerns for physician to address:       Zone phone for oncoming shift:          Activity:     Number times ambulated in hallways past shift: 0  Number of times OOB to chair past shift: 0    Cardiac:   Cardiac Monitoring: No           Access:  Current line(s): PIV     Genitourinary:   Urinary status: voiding and external catheter    Respiratory:      Chronic home O2 use?: YES  Incentive spirometer at bedside: NO       GI:     Current diet:  ADULT DIET; Regular; 4 carb choices (60 gm/meal); Low Fat/Low Chol/High Fiber/2 gm Na  ADULT ORAL NUTRITION SUPPLEMENT; Breakfast, Lunch, Dinner; Renal Oral Supplement  Passing flatus: YES  Tolerating current diet: YES       Pain Management:   Patient states pain is manageable on current regimen: YES    Skin:     Interventions: float heels, internal/external urinary devices, and nutritional support    Patient Safety:  Fall Score:    Interventions: bed/chair alarm, gripper socks, and pt to call before getting OOB       Length of Stay:  Expected LOS: 6  Actual LOS: 6      Idalia Sandoval RN

## 2024-05-17 NOTE — PROGRESS NOTES
Occupational Therapy    Pt's chart reviewed. Pt's Hgb 6.8 as of 5/15. Per nursing pt is getting a redraw this morning to see if transfusion is indicated. OT will defer at this time and follow up as able.

## 2024-05-17 NOTE — PROGRESS NOTES
ORTHO - Progress Note  Post Op day: * No surgery found *    Marilee Oakes     [id number]  female    73 y.o.    1950    Admit date:2024        SUBJECTIVE:     Marilee Oakes is a 73 y.o. female resting in the bed.  Nervous about the dressing change, \"I do better when family is around\"    OBJECTIVE:       Physical Exam:  General: alert, awake, cooperative, no distress.   Gastrointestinal:  non-distended .    Cardiovascular:Brisk cap refill in all distal extremities   Genitourinary: Voiding independently   Respiratory: No respiratory distress   Neurological:Neurovascular exam within normal limits.     Senstion intact: baseline severe LE diabetic neuropathy    Motor: + FHL/EHL.   Musculoskeletal:  Calves soft, supple, non-tender upon palpation   Dressing/Wound:  Clean, dry and intact. No significant erythema or swelling.    Vital Signs:       Patient Vitals for the past 8 hrs:   BP Temp Pulse Resp SpO2   24 1051 (!) 142/89 97.5 °F (36.4 °C) 93 16 --   24 0845 132/78 97.7 °F (36.5 °C) 87 19 --   24 0835 -- -- 79 16 100 %   24 0822 -- -- 86 16 100 %                                          Temp (24hrs), Av.8 °F (36.6 °C), Min:97.5 °F (36.4 °C), Max:98.2 °F (36.8 °C)      Labs:        Recent Labs     24  1747 05/15/24  1033 24  1025   HCT 25.6*   < > 25.6*   HGB 7.2*   < > 7.2*   INR 1.2*  --   --     < > = values in this interval not displayed.     PT/OT:              ASSESSMENT / PLAN:   Principal Problem:    Unable to care for self  Resolved Problems:    * No resolved hospital problems. *     -  Medical team following HB and PLT closely  -  Dressing changed today-- minimal drainage on dressing   ---medial sutures removed, replaced with steri-strip.  Lateral sutures NOT ready to remove  -  Incisional vac placed, skin padded and secured with ACE-  keep at least one week  -  Heel floated in CAM boot  -  Continue PT/OT NWB on the LLE  -  DVT prophylaxis- SCD w/

## 2024-05-17 NOTE — DISCHARGE SUMMARY
Discharge Summary    Name: Marilee Oakes  [id number]  YOB: 1950 (Age: 73 y.o.)   Date of Admission: 5/11/2024  Date of Discharge: 5/18/2024  Attending Physician: Judi Hammer MD    Discharge Diagnosis:   Recent MRSA wound infection POA  Left ankle fracture s/p ORIF March 22, 2024  MRSA bacteremia with new fevers 4/3/2024 - 4/12/2024 discharged on daptomycin  MRSA left ankle infection s/p hardware removal 4/25-5/10/2024 discharged on PO doxycycline  Family unable to care for patient at home, SNF recommended last admit  Acute metabolic encephalopathy   Acute delirium recurrent   New thrombocytopenia PLTs   Chronic diastolic CHF with ICD POA  CAD POA  A-fib with Watchman device POA   GERD    Consultations:  IP CONSULT TO ORTHOPEDIC SURGERY  IP CONSULT TO CASE MANAGEMENT  IP CONSULT TO NEPHROLOGY      Brief Admission History/Reason for Admission Per Lisa Troy MD:   Marilee Oakes is a 73 y.o.  female with PMHx significant for poorly controlled diabetes with severe diabetic neuropathy, A-fib status post watchman's, COPD with chronic respiratory failure on 2 L of oxygen at baseline, HILARY/ATN now on hemodialysis Monday Wednesday Friday, status post recent left ankle fracture status post MRSA infection requiring hardware removal just discharged yesterday after being treated with IV Zyvox for MRSA bacteremia and wound infection-switched over to p.o. doxycycline on discharge.  Patient unable to care for herself at home and not able to follow nonweightbearing on left ankle recommendations by orthopedics causing recurrent complications of the left ankle.  Patient had declined SNF placement recommended by PT OT last admission and had gone home with home health to come back within 24 hours.  ED with complications of left ankle.  Patient deemed in need for placement was recommended to be admitted to the hospital.  We were asked to admit for work up and evaluation of the above    05/18/24 0027 -- -- -- -- 16 --   05/18/24 0012 137/79 98.1 °F (36.7 °C) -- 79 20 --   05/17/24 2145 (!) 147/76 -- -- -- -- --   05/17/24 2045 (!) 153/79 97.8 °F (36.6 °C) Oral 82 16 100 %   05/17/24 1930 -- -- -- 80 16 100 %       Gen:    Not in distress  Chest: Clear lungs  CVS:   Regular rhythm.  No edema  Abd:  Soft, not distended, not tender  Neuro:  awake, moving all exts    Discharge/Recent Laboratory Results:  Recent Labs     05/18/24  0746      K 4.6      CO2 27   BUN 52*   CREATININE 3.15*   GLUCOSE 114*   CALCIUM 8.8     Recent Labs     05/18/24  0746   HGB 8.5*   HCT 31.6*   WBC 5.8   *       Discharge Medications:     Medication List        CONTINUE taking these medications      acetaminophen 500 MG tablet  Commonly known as: TYLENOL     albuterol sulfate  (90 Base) MCG/ACT inhaler  Commonly known as: PROVENTIL;VENTOLIN;PROAIR     amLODIPine 5 MG tablet  Commonly known as: NORVASC  Take 1 tablet by mouth daily     aspirin 81 MG chewable tablet  Take 1 tablet by mouth daily     balsum peru-castor oil Oint ointment  Apply topically 2 times daily     budesonide-formoterol 160-4.5 MCG/ACT Aero  Commonly known as: Symbicort  Inhale 2 puffs into the lungs 2 times daily     bumetanide 1 MG tablet  Commonly known as: BUMEX     carvedilol 25 MG tablet  Commonly known as: COREG  TAKE 1 TABLET TWICE DAILY WITH MEALS     clotrimazole-betamethasone 1-0.05 % cream  Commonly known as: LOTRISONE     insulin  UNIT/ML injection vial  Commonly known as: HumuLIN N;NovoLIN N  Inject 8 Units into the skin daily for 3 days     isosorbide mononitrate 60 MG extended release tablet  Commonly known as: IMDUR  TAKE 1 TABLET EVERY DAY     magnesium oxide 400 MG tablet  Commonly known as: MAG-OX     naloxone 4 MG/0.1ML Liqd nasal spray  Commonly known as: Narcan  1 spray by Nasal route as needed for Opioid Reversal     nystatin 138980 UNIT/GM cream  Commonly known as: MYCOSTATIN  Apply topically 2

## 2024-05-18 VITALS
HEIGHT: 69 IN | HEART RATE: 79 BPM | TEMPERATURE: 97.5 F | DIASTOLIC BLOOD PRESSURE: 69 MMHG | WEIGHT: 240 LBS | RESPIRATION RATE: 16 BRPM | SYSTOLIC BLOOD PRESSURE: 117 MMHG | OXYGEN SATURATION: 97 % | BODY MASS INDEX: 35.55 KG/M2

## 2024-05-18 LAB
ANION GAP SERPL CALC-SCNC: 5 MMOL/L (ref 5–15)
BUN SERPL-MCNC: 52 MG/DL (ref 6–20)
BUN/CREAT SERPL: 17 (ref 12–20)
CALCIUM SERPL-MCNC: 8.8 MG/DL (ref 8.5–10.1)
CHLORIDE SERPL-SCNC: 108 MMOL/L (ref 97–108)
CO2 SERPL-SCNC: 27 MMOL/L (ref 21–32)
CREAT SERPL-MCNC: 3.15 MG/DL (ref 0.55–1.02)
ERYTHROCYTE [DISTWIDTH] IN BLOOD BY AUTOMATED COUNT: 19.2 % (ref 11.5–14.5)
GLUCOSE BLD STRIP.AUTO-MCNC: 112 MG/DL (ref 65–117)
GLUCOSE BLD STRIP.AUTO-MCNC: 187 MG/DL (ref 65–117)
GLUCOSE SERPL-MCNC: 114 MG/DL (ref 65–100)
HCT VFR BLD AUTO: 31.6 % (ref 35–47)
HGB BLD-MCNC: 8.5 G/DL (ref 11.5–16)
MCH RBC QN AUTO: 24.6 PG (ref 26–34)
MCHC RBC AUTO-ENTMCNC: 26.9 G/DL (ref 30–36.5)
MCV RBC AUTO: 91.6 FL (ref 80–99)
NRBC # BLD: 0.02 K/UL (ref 0–0.01)
NRBC BLD-RTO: 0.3 PER 100 WBC
PF4 HEPARIN CMPLX AB SER-ACNC: 0.05 OD (ref 0–0.4)
PLATELET # BLD AUTO: 103 K/UL (ref 150–400)
POTASSIUM SERPL-SCNC: 4.6 MMOL/L (ref 3.5–5.1)
RBC # BLD AUTO: 3.45 M/UL (ref 3.8–5.2)
SERVICE CMNT-IMP: ABNORMAL
SERVICE CMNT-IMP: NORMAL
SODIUM SERPL-SCNC: 140 MMOL/L (ref 136–145)
WBC # BLD AUTO: 5.8 K/UL (ref 3.6–11)

## 2024-05-18 PROCEDURE — 94761 N-INVAS EAR/PLS OXIMETRY MLT: CPT

## 2024-05-18 PROCEDURE — 2580000003 HC RX 258: Performed by: INTERNAL MEDICINE

## 2024-05-18 PROCEDURE — 2700000000 HC OXYGEN THERAPY PER DAY

## 2024-05-18 PROCEDURE — 6360000002 HC RX W HCPCS: Performed by: INTERNAL MEDICINE

## 2024-05-18 PROCEDURE — 6370000000 HC RX 637 (ALT 250 FOR IP): Performed by: INTERNAL MEDICINE

## 2024-05-18 PROCEDURE — 36415 COLL VENOUS BLD VENIPUNCTURE: CPT

## 2024-05-18 PROCEDURE — 82962 GLUCOSE BLOOD TEST: CPT

## 2024-05-18 PROCEDURE — 85027 COMPLETE CBC AUTOMATED: CPT

## 2024-05-18 PROCEDURE — 80048 BASIC METABOLIC PNL TOTAL CA: CPT

## 2024-05-18 PROCEDURE — 6370000000 HC RX 637 (ALT 250 FOR IP)

## 2024-05-18 PROCEDURE — 94640 AIRWAY INHALATION TREATMENT: CPT

## 2024-05-18 RX ADMIN — SODIUM CHLORIDE, PRESERVATIVE FREE 10 ML: 5 INJECTION INTRAVENOUS at 14:03

## 2024-05-18 RX ADMIN — ONDANSETRON 4 MG: 2 INJECTION INTRAMUSCULAR; INTRAVENOUS at 14:03

## 2024-05-18 RX ADMIN — BENZONATATE 100 MG: 100 CAPSULE ORAL at 14:03

## 2024-05-18 RX ADMIN — ISOSORBIDE MONONITRATE 60 MG: 30 TABLET, EXTENDED RELEASE ORAL at 09:07

## 2024-05-18 RX ADMIN — AMLODIPINE BESYLATE 5 MG: 5 TABLET ORAL at 09:06

## 2024-05-18 RX ADMIN — PANTOPRAZOLE SODIUM 40 MG: 40 TABLET, DELAYED RELEASE ORAL at 05:55

## 2024-05-18 RX ADMIN — ASPIRIN 81 MG: 81 TABLET, CHEWABLE ORAL at 09:07

## 2024-05-18 RX ADMIN — ARFORMOTEROL TARTRATE: 15 SOLUTION RESPIRATORY (INHALATION) at 07:31

## 2024-05-18 RX ADMIN — INSULIN HUMAN 10 UNITS: 100 INJECTION, SUSPENSION SUBCUTANEOUS at 09:06

## 2024-05-18 RX ADMIN — PREDNISONE 40 MG: 20 TABLET ORAL at 09:07

## 2024-05-18 RX ADMIN — BUMETANIDE 2 MG: 1 TABLET ORAL at 09:07

## 2024-05-18 RX ADMIN — Medication 1 CAPSULE: at 09:07

## 2024-05-18 RX ADMIN — HYDROXYZINE HYDROCHLORIDE 25 MG: 25 TABLET, FILM COATED ORAL at 14:02

## 2024-05-18 RX ADMIN — BENZONATATE 100 MG: 100 CAPSULE ORAL at 09:07

## 2024-05-18 RX ADMIN — DOXYCYCLINE HYCLATE 100 MG: 100 TABLET, COATED ORAL at 09:06

## 2024-05-18 RX ADMIN — CARVEDILOL 25 MG: 12.5 TABLET, FILM COATED ORAL at 09:07

## 2024-05-18 NOTE — PROGRESS NOTES
RENAL  PROGRESS NOTE             Assessment   :                                               Plan:  HILARY on CKD stage 3b  Anxiety/agitation- better   Anemia  COPD  CHF with AICD  Afib w/ watchman  Left ankle fracture with infection  Recurrent uti HILARY needing PRN dialysis  Creatinine trending lower.  3.15<--3.4  No dialysis today.  Need to be followed up closely rehab place for need for dialysis    -3.5-hour  dialysis treatment-1 L fluid removed  -hemodialysis stopped after patient stated that she did not want dialysis.  HD initiated , HD MWF for now - short HD today; ATN; Upc ratio 2.2; No hydro     added amlodipine 5 mg daily on      Hgb < 10; continue retacrit     perm cath  and   outpatient HD at [facility]                   Subjective:   Seen and examined.  Accompanied by spouse.  Expecting to be discharged later today    Objective:   VITALS SIGNS:    /69   Pulse 79   Temp 97.5 °F (36.4 °C) (Axillary)   Resp 16   Ht 1.753 m (5' 9\")   Wt 108.9 kg (240 lb)   SpO2 97%   BMI 35.44 kg/m²             Temp (24hrs), Av.9 °F (36.6 °C), Min:97.5 °F (36.4 °C), Max:98.1 °F (36.7 °C)         PHYSICAL EXAM:    Obese, pleasant, alert oriented x 3  No respiratory distress  Nonlabored respiration  Mild edema  Nontunneled hemodialysis access R  side  Bruising frequent  Coherent  DATA REVIEW:     INTAKE / OUTPUT:   Last shift:      No intake/output data recorded.  Last 3 shifts: No intake/output data recorded.  No intake or output data in the 24 hours ending 24 1529          LABS:   LABS:  Recent Labs     24  0746 05/15/24  1033 24  1747 24  0815 24  1240 05/10/24  0309 24  0020    144 139 143 141 138 136   K 4.6 3.9 4.4 3.6 3.7 4.2 4.1    108 106 108 106 105 104   CO2 27 30 29 29 32 28 29   BUN 52* 52* 53* 56* 41* 47* 32*   CREATININE 3.15* 3.41* 3.34* 3.20* 2.42* 2.67* 1.89*   CALCIUM 8.8 8.4* 8.6 8.6 8.3* 8.5 7.8*   PHOS  --  3.8  --  3.4  --  4.2  3.2   MG  --   --   --   --  1.7 1.9 1.9     Recent Labs     05/18/24  0746 05/17/24  1025 05/15/24  1033   WBC 5.8 4.5 5.2   HGB 8.5* 7.2* 6.8*   HCT 31.6* 25.6* 23.8*   * 77* 65*     No results for input(s): \"KU\", \"CLU\", \"CREAU\" in the last 720 hours.    Invalid input(s): \"ALEJANDRA\", \"PROU\"

## 2024-05-18 NOTE — PLAN OF CARE
Problem: Safety - Adult  Goal: Free from fall injury  Outcome: Progressing     Problem: Respiratory - Adult  Goal: Achieves optimal ventilation and oxygenation  5/17/2024 1933 by Danni Webber, RT  Outcome: Progressing     Problem: Pain  Goal: Verbalizes/displays adequate comfort level or baseline comfort level  Outcome: Progressing     Problem: Skin/Tissue Integrity  Goal: Absence of new skin breakdown  Description: 1.  Monitor for areas of redness and/or skin breakdown  2.  Assess vascular access sites hourly  3.  Every 4-6 hours minimum:  Change oxygen saturation probe site  4.  Every 4-6 hours:  If on nasal continuous positive airway pressure, respiratory therapy assess nares and determine need for appliance change or resting period.  Outcome: Progressing

## 2024-05-18 NOTE — CARE COORDINATION
Transition of Care Plan:    RUR: 35%-\"High Risk\"  Prior Level of Functioning: Requires assistance with her ADLs and IADLs  Disposition: SNF-Patient has been accepted to [facility] and she has been assigned to room 137-A  If SNF or IPR: Date FOC offered: 5/16/24  Date FOC received: 5/16/24  Accepting facility: [facility]   Date authorization started with reference number: N/A, the patient does not require insurance auth  Follow up appointments: PCP & Specialists as indicated   DME needed: TBD by SNF  Transportation at discharge: CM arranged for Hospital to Home stretcher transport for the patient at 2:45 PM  IM/IMM Medicare/ letter given: 2nd IM done via phone with the patient's   Is patient a  and connected with [facility]? N/A   If yes, was [facility] transfer form completed and [facility] notified?   Caregiver Contact: Wesley Oakes, , Phone: [phone]  Discharge Caregiver contacted prior to discharge? CM spoke to the patient's  via phone   Care Conference needed? No  Barriers to discharge: N/A, patient is being discharged today, 5/18/24    CM contacted [facility] and confirmed that they are able to accept the patient for admission today, 5/18/24. The phone number for report is [phone]. The patient has been assigned to room 137-A    CM received nursing supervisor approval to arrange for Hospital to Home stretcher transport for the patient. They will be arriving to transport the patient at 2:45 PM. CM placed transport paperwork on the patient's chart.     2nd IM was done verbally with the patient's  via phone.     From CM perspective, the patient can be discharged.     Mya Hinton, LIYAH  [phone]

## 2024-05-20 ENCOUNTER — OFFICE VISIT (OUTPATIENT)
Facility: CLINIC | Age: 74
End: 2024-05-20
Payer: MEDICARE

## 2024-05-20 VITALS — SYSTOLIC BLOOD PRESSURE: 130 MMHG | HEART RATE: 72 BPM | RESPIRATION RATE: 22 BRPM | DIASTOLIC BLOOD PRESSURE: 80 MMHG

## 2024-05-20 DIAGNOSIS — J41.0 SIMPLE CHRONIC BRONCHITIS (HCC): Chronic | ICD-10-CM

## 2024-05-20 DIAGNOSIS — S82.842D CLOSED BIMALLEOLAR FRACTURE OF LEFT ANKLE WITH ROUTINE HEALING, SUBSEQUENT ENCOUNTER: Primary | ICD-10-CM

## 2024-05-20 DIAGNOSIS — E11.40 TYPE 2 DIABETES MELLITUS WITH DIABETIC NEUROPATHY, WITHOUT LONG-TERM CURRENT USE OF INSULIN (HCC): Chronic | ICD-10-CM

## 2024-05-20 DIAGNOSIS — S22.41XD CLOSED FRACTURE OF MULTIPLE RIBS OF RIGHT SIDE WITH ROUTINE HEALING: ICD-10-CM

## 2024-05-20 DIAGNOSIS — I25.83 CORONARY ARTERY DISEASE DUE TO LIPID RICH PLAQUE: Chronic | ICD-10-CM

## 2024-05-20 DIAGNOSIS — F32.A ANXIETY AND DEPRESSION: Chronic | ICD-10-CM

## 2024-05-20 DIAGNOSIS — N18.6 ESRD (END STAGE RENAL DISEASE) ON DIALYSIS (HCC): Chronic | ICD-10-CM

## 2024-05-20 DIAGNOSIS — E78.00 HYPERCHOLESTEROLEMIA: Chronic | ICD-10-CM

## 2024-05-20 DIAGNOSIS — I25.10 CORONARY ARTERY DISEASE DUE TO LIPID RICH PLAQUE: Chronic | ICD-10-CM

## 2024-05-20 DIAGNOSIS — I50.22 CHRONIC SYSTOLIC HEART FAILURE (HCC): Chronic | ICD-10-CM

## 2024-05-20 DIAGNOSIS — J96.01 ACUTE RESPIRATORY FAILURE WITH HYPOXIA (HCC): ICD-10-CM

## 2024-05-20 DIAGNOSIS — G31.84 MCI (MILD COGNITIVE IMPAIRMENT) WITH MEMORY LOSS: ICD-10-CM

## 2024-05-20 DIAGNOSIS — I25.5 ISCHEMIC CARDIOMYOPATHY: Chronic | ICD-10-CM

## 2024-05-20 DIAGNOSIS — Z99.2 ESRD (END STAGE RENAL DISEASE) ON DIALYSIS (HCC): Chronic | ICD-10-CM

## 2024-05-20 DIAGNOSIS — E66.01 SEVERE OBESITY (BMI 35.0-39.9) WITH COMORBIDITY (HCC): ICD-10-CM

## 2024-05-20 DIAGNOSIS — I08.0 MITRAL REGURGITATION AND AORTIC STENOSIS: Chronic | ICD-10-CM

## 2024-05-20 DIAGNOSIS — Z95.810 PRESENCE OF COMBINATION INTERNAL CARDIAC DEFIBRILLATOR (ICD) AND PACEMAKER: Chronic | ICD-10-CM

## 2024-05-20 DIAGNOSIS — F41.9 ANXIETY AND DEPRESSION: Chronic | ICD-10-CM

## 2024-05-20 DIAGNOSIS — Z95.818 PRESENCE OF WATCHMAN LEFT ATRIAL APPENDAGE CLOSURE DEVICE: Chronic | ICD-10-CM

## 2024-05-20 DIAGNOSIS — I48.20 CHRONIC ATRIAL FIBRILLATION (HCC): Chronic | ICD-10-CM

## 2024-05-20 DIAGNOSIS — I10 ESSENTIAL HYPERTENSION: Chronic | ICD-10-CM

## 2024-05-20 PROBLEM — I48.91 ATRIAL FIBRILLATION (HCC): Status: RESOLVED | Noted: 2021-10-06 | Resolved: 2024-05-20

## 2024-05-20 PROBLEM — R78.81 MRSA BACTEREMIA: Status: RESOLVED | Noted: 2023-12-21 | Resolved: 2024-05-20

## 2024-05-20 PROBLEM — R50.9 FEVER AND CHILLS: Status: RESOLVED | Noted: 2023-12-19 | Resolved: 2024-05-20

## 2024-05-20 PROBLEM — Z95.0 S/P PLACEMENT OF CARDIAC PACEMAKER: Chronic | Status: ACTIVE | Noted: 2018-11-13

## 2024-05-20 PROBLEM — I87.2 VENOUS STASIS DERMATITIS OF BOTH LOWER EXTREMITIES: Chronic | Status: ACTIVE | Noted: 2019-12-17

## 2024-05-20 PROBLEM — E08.8 DIABETES MELLITUS DUE TO UNDERLYING CONDITION, CONTROLLED, WITH COMPLICATION (HCC): Status: RESOLVED | Noted: 2024-04-27 | Resolved: 2024-05-20

## 2024-05-20 PROBLEM — E16.2 HYPOGLYCEMIA: Status: RESOLVED | Noted: 2021-01-01 | Resolved: 2024-05-20

## 2024-05-20 PROBLEM — E66.9 OBESITY (BMI 30-39.9): Chronic | Status: ACTIVE | Noted: 2019-04-30

## 2024-05-20 PROBLEM — I50.9 HEART FAILURE (HCC): Status: RESOLVED | Noted: 2024-01-22 | Resolved: 2024-05-20

## 2024-05-20 PROBLEM — N18.31 STAGE 3A CHRONIC KIDNEY DISEASE (HCC): Chronic | Status: ACTIVE | Noted: 2018-11-13

## 2024-05-20 PROBLEM — M54.50 LOWER BACK PAIN: Status: RESOLVED | Noted: 2020-12-13 | Resolved: 2024-05-20

## 2024-05-20 PROBLEM — E87.6 HYPOKALEMIA: Status: RESOLVED | Noted: 2018-10-07 | Resolved: 2024-05-20

## 2024-05-20 PROBLEM — R09.02 HYPOXIA: Status: RESOLVED | Noted: 2020-12-13 | Resolved: 2024-05-20

## 2024-05-20 PROBLEM — N18.31 STAGE 3A CHRONIC KIDNEY DISEASE (HCC): Chronic | Status: RESOLVED | Noted: 2018-11-13 | Resolved: 2024-05-20

## 2024-05-20 PROBLEM — T36.95XA ANTIBIOTIC CAUSING ADVERSE EFFECT: Status: RESOLVED | Noted: 2024-04-27 | Resolved: 2024-05-20

## 2024-05-20 PROBLEM — E11.42 DIABETIC POLYNEUROPATHY ASSOCIATED WITH TYPE 2 DIABETES MELLITUS (HCC): Chronic | Status: ACTIVE | Noted: 2023-12-21

## 2024-05-20 PROBLEM — B95.62 MRSA BACTEREMIA: Status: RESOLVED | Noted: 2023-12-21 | Resolved: 2024-05-20

## 2024-05-20 PROBLEM — J44.9 CHRONIC OBSTRUCTIVE PULMONARY DISEASE (HCC): Chronic | Status: ACTIVE | Noted: 2024-04-27

## 2024-05-20 PROBLEM — R11.2 NAUSEA AND VOMITING: Status: RESOLVED | Noted: 2023-12-21 | Resolved: 2024-05-20

## 2024-05-20 PROCEDURE — 1123F ACP DISCUSS/DSCN MKR DOCD: CPT | Performed by: FAMILY MEDICINE

## 2024-05-20 PROCEDURE — 3044F HG A1C LEVEL LT 7.0%: CPT | Performed by: FAMILY MEDICINE

## 2024-05-20 PROCEDURE — 99306 1ST NF CARE HIGH MDM 50: CPT | Performed by: FAMILY MEDICINE

## 2024-05-20 NOTE — PROGRESS NOTES
occluded. A Watchman left atrial appendage occluder is present and completely occludes the appendage.    History of migraine     Hypercholesterolemia 01/22/2018    Irritable bowel syndrome     IBS, spinal stenosis    Long-term use of high-risk medication 01/22/2018    Lumbar spinal stenosis     Morbid (severe) obesity due to excess calories (HCC)     Neuropathy     Obesity (BMI 30-39.9) 04/30/2019    Obstructive sleep apnea     uses CPAP    Permanent atrial fibrillation (HCC)     Presence of implantable cardioverter-defibrillator (ICD)     Presence of Watchman left atrial appendage closure device     Type 2 diabetes mellitus with diabetic neuropathy, without long-term current use of insulin (HCC) 06/05/2016    Venous insufficiency     Vitamin B12 deficiency        Past Surgical History:  Past Surgical History:   Procedure Laterality Date    ANKLE FRACTURE SURGERY Left 3/23/2024    LEFT ANKLE OPEN REDUCTION INTERNAL FIXATION performed by Edgardo Farnsworth DO at [facility] MAIN OR    APPENDECTOMY      BREAST BIOPSY Left     about 4-5 years ago from 2022, neg    CHOLECYSTECTOMY  11/15/2018    COLONOSCOPY N/A 3/14/2018    COLONOSCOPY performed by Pepito Quintero MD at [facility] ENDOSCOPY    HYSTERECTOMY (CERVIX STATUS UNKNOWN)      IR KYPHOPLASTY LUMBAR FIRST LEVEL  12/22/2020    IR KYPHOPLASTY LUMBAR FIRST LEVEL 12/22/2020 [facility] RAD ANGIO IR    IR KYPHOPLASTY LUMBAR FIRST LEVEL  12/22/2020    IR KYPHOPLASTY THORACIC FIRST LEVEL  1/6/2021    IR KYPHOPLASTY THORACIC FIRST LEVEL 1/6/2021 [facility] RAD ANGIO IR    IR KYPHOPLASTY THORACIC FIRST LEVEL  1/6/2021    IR NONTUNNELED VASCULAR CATHETER  5/2/2024    IR NONTUNNELED VASCULAR CATHETER 5/2/2024 Jeremy Oakes Jr., APRN - NP [facility] RAD ANGIO IR    IR TUNNELED CATHETER PLACEMENT GREATER THAN 5 YEARS  5/9/2024    IR TUNNELED CATHETER PLACEMENT GREATER THAN 5 YEARS 5/9/2024 Claudette Cochran APRSAMANTHA - NP [facility] RAD ANGIO IR    LEG SURGERY Left 4/30/2024    ANKLE INCISION AND DRAINAGE WITH HARDWARE

## 2024-05-22 ENCOUNTER — OFFICE VISIT (OUTPATIENT)
Facility: CLINIC | Age: 74
End: 2024-05-22

## 2024-05-22 DIAGNOSIS — L97.515 ULCER OF RIGHT FOOT WITH MUSCLE INVOLVEMENT WITHOUT EVIDENCE OF NECROSIS (HCC): ICD-10-CM

## 2024-05-22 DIAGNOSIS — I50.23 ACUTE ON CHRONIC SYSTOLIC CONGESTIVE HEART FAILURE (HCC): ICD-10-CM

## 2024-05-22 DIAGNOSIS — Z76.89 ENCOUNTER FOR SOCIAL WORK INTERVENTION: Primary | ICD-10-CM

## 2024-05-22 DIAGNOSIS — I25.118 ATHEROSCLEROTIC HEART DISEASE OF NATIVE CORONARY ARTERY WITH OTHER FORMS OF ANGINA PECTORIS (HCC): ICD-10-CM

## 2024-05-22 NOTE — PROGRESS NOTES
Social Work Assessment    Date of referral: 5/22/24  Referral received from: Bel Pereira NP  Reason for referral: Assess for needs    MSW met with patient and , Wesley, to introduce them to [facility] Social Work.  Mrs. Oakes was sleeping for the majority of the time.  Her  provided the majority of the information.  Mr. Oakes stated that his wife was mostly independent before she broke her ankle.  She utilized a rollator.     Social history    Living situation prior to SNF:   Mrs. Oakes resided with her  in a ranch style home with two steps to enter.  Mr. Oakes stated that there is a ramp in the rear of the home that leads to a gravel driveway.    Anticipated living situation following discharge:  It is anticipated that patient will return to her home with her .    Marital status: [] Single   [x]   []     []    [] Life Partner   [] Other  Mrs. Oakes has been  to  Wesley for 53 years.    Family support:  Mr. Oakes stated that they have 2 daughters that are .     Previous career:   Mrs. Oakes retired from [employer] where she worked for 43 years.    Was assistance needed for ADLs prior to SNF?  [x] Yes   [] No  Additional notes:    Spirituality/Congregational affiliation:   Additional notes:  Mr. Oakes stated that the family is Yazidi.    Is patient a :          [] Yes    [x]  No  Additional notes:    Advance Care Plan:          [x] Yes   [] No  Additional notes:    Housing:   Are you concerned about housing?                                  [] Yes   [x] No  Additional notes:    Food:  Can you afford nutritious meals?                                     [x] Yes   [] No  Are you able to prepare your own meals?                       [x]Yes   [] No  Are you able to access food?                                           [x] Yes   [] No  Additional notes:    Financial:   Do you manage your own finances?

## 2024-05-24 ENCOUNTER — OFFICE VISIT (OUTPATIENT)
Facility: CLINIC | Age: 74
End: 2024-05-24

## 2024-05-24 DIAGNOSIS — I10 HYPERTENSION, UNSPECIFIED TYPE: ICD-10-CM

## 2024-05-24 DIAGNOSIS — I50.23 ACUTE ON CHRONIC SYSTOLIC CONGESTIVE HEART FAILURE (HCC): Primary | ICD-10-CM

## 2024-05-24 DIAGNOSIS — Z46.89 ENCOUNTER FOR MANAGEMENT OF WOUND VAC: ICD-10-CM

## 2024-05-24 DIAGNOSIS — G47.00 INSOMNIA, UNSPECIFIED TYPE: ICD-10-CM

## 2024-05-24 DIAGNOSIS — I48.20 CHRONIC ATRIAL FIBRILLATION (HCC): ICD-10-CM

## 2024-05-24 DIAGNOSIS — L97.515 ULCER OF RIGHT FOOT WITH MUSCLE INVOLVEMENT WITHOUT EVIDENCE OF NECROSIS (HCC): ICD-10-CM

## 2024-05-24 NOTE — PROGRESS NOTES
PLACE OF SERVICE:  [facility] [address] [place] [address]    SKILLED VISIT      Chief Complaint:  skilled visit     [facility]  Hospital notes:  Marilee Oakes is a 73 y.o.  female with PMHx significant for poorly controlled diabetes with severe diabetic neuropathy, A-fib status post watchman's, COPD with chronic respiratory failure on 2 L of oxygen at baseline, HILARY/ATN now on hemodialysis Monday Wednesday Friday, status post recent left ankle fracture status post MRSA infection requiring hardware removal just discharged on 5/11/24  after being treated with IV Zyvox for MRSA bacteremia and wound infection-switched over to p.o. doxycycline on discharge.  Patient unable to care for herself at home and not able to follow nonweightbearing on left ankle recommendations by orthopedics causing recurrent complications of the left ankle.  Patient had declined SNF placement recommended by PT OT last admission and had gone home with home health to come back within 24 hours. Returned back to the ED with complications of left ankle.  Patient deemed in need for placement was recommended to be admitted to the hospital.  After stabilizing the hospital she has agreed to come to [facility] for her rehab, physical therapy And to continue her dialysis.  She had wound VAC running on her left leg and her right leg dressing was clean dry intact.  No bleeding or drainage noted  She is on  isosorbide  and amlodipine for her Hypertension, blood pressure trends stable  and stable .Hold paramettes in place. For ESRD she is compliant with her dialysis in the facility will continue with her renal multivitamins  History of CHF she is currently on Bumex 2 mg and Coreg 25 mg no orthopnea no PND no shortness of breath.  COPD lungs diminished bilaterally no wheezing noted no shortness of breath she will continue with her inhalers oxygen saturation 99. History of anemia baseline hemoglobin is between 8 and 9 did

## 2024-05-29 ENCOUNTER — OFFICE VISIT (OUTPATIENT)
Facility: CLINIC | Age: 74
End: 2024-05-29
Payer: MEDICARE

## 2024-05-29 DIAGNOSIS — Z99.2 ESRD (END STAGE RENAL DISEASE) ON DIALYSIS (HCC): Chronic | ICD-10-CM

## 2024-05-29 DIAGNOSIS — J44.0 CHRONIC OBSTRUCTIVE PULMONARY DISEASE WITH ACUTE LOWER RESPIRATORY INFECTION (HCC): Chronic | ICD-10-CM

## 2024-05-29 DIAGNOSIS — I10 ESSENTIAL HYPERTENSION: Primary | Chronic | ICD-10-CM

## 2024-05-29 DIAGNOSIS — I48.91 ATRIAL FIBRILLATION, UNSPECIFIED TYPE (HCC): ICD-10-CM

## 2024-05-29 DIAGNOSIS — N18.6 ESRD (END STAGE RENAL DISEASE) ON DIALYSIS (HCC): Chronic | ICD-10-CM

## 2024-05-29 DIAGNOSIS — I50.9 ACUTE CONGESTIVE HEART FAILURE, UNSPECIFIED HEART FAILURE TYPE (HCC): ICD-10-CM

## 2024-05-29 DIAGNOSIS — S81.802A OPEN WOUND OF LEFT LOWER LEG, INITIAL ENCOUNTER: ICD-10-CM

## 2024-05-29 DIAGNOSIS — D64.9 ANEMIA, UNSPECIFIED TYPE: ICD-10-CM

## 2024-05-29 PROCEDURE — 1123F ACP DISCUSS/DSCN MKR DOCD: CPT | Performed by: NURSE PRACTITIONER

## 2024-05-29 PROCEDURE — 99309 SBSQ NF CARE MODERATE MDM 30: CPT | Performed by: NURSE PRACTITIONER

## 2024-05-29 NOTE — ASSESSMENT & PLAN NOTE
Status post wound VAC removal from left ankle wound.  Sutures removed today by in-house wound nurse.  In-house wound NP to continue to evaluate and treat.  Patient continues on doxycycline twice a day until 6/3/2024   In-house wound care following notes reviewed from 5/20/24

## 2024-05-29 NOTE — ASSESSMENT & PLAN NOTE
Continue isosorbide and Coreg and amlodipine, hold parameters in place blood pressure trends reviewed remained stable in acceptable range.

## 2024-05-29 NOTE — ASSESSMENT & PLAN NOTE
Continue inhalers, not in any acute respiratory status oxygen saturation 99% on 2 L oxygen continuous

## 2024-05-29 NOTE — PROGRESS NOTES
PLACE OF SERVICE:  [facility] [address] [place] [address]    SKILLED VISIT    5/29/2024    Chief Complaint: ESRD on dialysis,     HPI : Marilee Oakes is a 73 y.o. female patient seen today for for follow-up.  Patient is alert and oriented able to make her needs known.  Patient denies signs and respiratory distress, lungs clear upon assessment.  Patient has history of COPD with chronic respiratory failure she continues on 2 L of oxygen.  Patient status post Watchman for history of A-fib, with stable rate and rhythm. She continues on Coreg twice a day and aspirin 81 mg. Patient continues on dialysis Tuesday Thursday and Saturday for history of ESRD, via right chest port access. Patient continues on doxycycline twice a day until 6/ 3/2024 for infected left ankle fracture with hardware removal. Patient was admitted to facility with wound VAC in which was removed last Friday, patient seen and evaluated by in-house wound NP. Sutures removed by in-house wound nurse today.  As per wound nurse site clean dry and intact without signs of infection. Patient remains on nonweightbearing to ankle at this time.  Patient is scheduled for follow-up orthopedic appointment on 6/6/2024.  For history of hypertension she continues on amlodipine and Isosorbide Mononitrate, blood pressure and heart rate remained stable.  For history of CHF she continues on Bumex 2 mg, will continue to monitor weights weekly and monitor for signs and symptoms of dehydration.  Labs as of 5/27/2024 hemoglobin 7.4 new order placed for iron supplement.  Patient does have anemia at baseline with hemoglobin A1c between 8 and 9, will continue to monitor H&H periodically.  Patient continues to work with OT and PT notes reviewed.  No further concerns reported from patient at this time she remains hemodynamically stable at present.  Will continue to monitor closely.    PMH:   CHF ESRD A-fib with watchman syndrome GERD 
Age appropriate behavior

## 2024-05-29 NOTE — ASSESSMENT & PLAN NOTE
Patient remained stable on Bumex 2 mg and aspirin 81 mg.  Will continue to monitor weights weekly.

## 2024-05-29 NOTE — ASSESSMENT & PLAN NOTE
Heart rate trends reviewed remained stable continue aspirin for anticoagulation isosorbide and Coreg for rate control

## 2024-05-29 NOTE — ASSESSMENT & PLAN NOTE
Hemoglobin 7.5 hematocrit 24.0 as of 5/27/2021.  New order placed for iron supplement daily.    Patient hemoglobin at baseline is 8 and 9  Will continue to monitor H&H periodically

## 2024-05-29 NOTE — ASSESSMENT & PLAN NOTE
Patient continues on dialysis Tuesday Thursday Saturday without any complication via right chest access.

## 2024-05-31 ENCOUNTER — OFFICE VISIT (OUTPATIENT)
Facility: CLINIC | Age: 74
End: 2024-05-31

## 2024-05-31 DIAGNOSIS — D63.1 ANEMIA DUE TO CHRONIC KIDNEY DISEASE, ON CHRONIC DIALYSIS (HCC): ICD-10-CM

## 2024-05-31 DIAGNOSIS — I10 ESSENTIAL HYPERTENSION: Chronic | ICD-10-CM

## 2024-05-31 DIAGNOSIS — Z99.2 ESRD (END STAGE RENAL DISEASE) ON DIALYSIS (HCC): Chronic | ICD-10-CM

## 2024-05-31 DIAGNOSIS — N18.6 ESRD (END STAGE RENAL DISEASE) ON DIALYSIS (HCC): Chronic | ICD-10-CM

## 2024-05-31 DIAGNOSIS — N18.6 ANEMIA DUE TO CHRONIC KIDNEY DISEASE, ON CHRONIC DIALYSIS (HCC): ICD-10-CM

## 2024-05-31 DIAGNOSIS — Z99.2 ANEMIA DUE TO CHRONIC KIDNEY DISEASE, ON CHRONIC DIALYSIS (HCC): ICD-10-CM

## 2024-05-31 DIAGNOSIS — J44.9 CHRONIC OBSTRUCTIVE PULMONARY DISEASE, UNSPECIFIED COPD TYPE (HCC): Chronic | ICD-10-CM

## 2024-05-31 DIAGNOSIS — S81.802A OPEN WOUND OF LEFT LOWER LEG, INITIAL ENCOUNTER: Primary | ICD-10-CM

## 2024-05-31 DIAGNOSIS — I48.91 ATRIAL FIBRILLATION, UNSPECIFIED TYPE (HCC): ICD-10-CM

## 2024-05-31 NOTE — PROGRESS NOTES
PLACE OF SERVICE:  [facility] [address] [place] [address]    SKILLED VISIT        Chief Complaint: ESRD on dialysis,     HPI : Marilee Oakes is a 73 y.o. female patient seen today for for follow-up.  Patient is alert and oriented able to make her needs known.  Patient denies signs and respiratory distress, lungs clear upon assessment. Patient has history of COPD with chronic respiratory failure she continues on 2 L of oxygen. Patient status post Watchman for history of A-fib, with stable rate and rhythm. She continues on Coreg twice a day and aspirin 81 mg. Patient continues on dialysis Tuesday Thursday and Saturday for history of ESRD, via right chest port access. Patient continues on doxycycline twice a day until 6/ 3/2024 for infected left ankle fracture with hardware removal. Patient is complaining of diarrhea secondary to doxycycline treatment, new order placed for probiotic and twice daily x 10 days and Imodium as needed for diarrhea. Patient status post sutures removal by in-house wound nurse today, site remains clean dry and intact without signs of infection. Patient remains on nonweightbearing to ankle at this time.  Patient is scheduled for follow-up orthopedic appointment on 6/6/2024. For history of hypertension she continues on amlodipine and Isosorbide Mononitrate, blood pressure and heart rate remained within therapeutic range. For history of CHF she continues on Bumex 2 mg, will continue to monitor weights weekly and labs for signs and symptoms of dehydration.  She continues on iron supplement daily, patient does have anemia at baseline with hemoglobin A1c between 8 and 9, will continue to monitor H&H periodically. Patient continues to work with OT and PT she remains motivated and continues to participate. No further concerns reported.  She remains hemodynamically stable at present. Will continue to monitor closely.    PMH:   CHF ESRD A-fib with watchman

## 2024-06-01 NOTE — ASSESSMENT & PLAN NOTE
Continue on isosorbide and Coreg and amlodipine, hold parameters in place blood pressure trends reviewed remained stable in therapeutic range.

## 2024-06-01 NOTE — ASSESSMENT & PLAN NOTE
Status post staples removal by in-house wound nurse.  In-house wound NP to continue to evaluate and treat.  Patient continues on doxycycline twice a day until 6/3/2024   In-house wound care following notes reviewed from 5/20/24

## 2024-06-01 NOTE — ASSESSMENT & PLAN NOTE
Heart rate trends reviewed remained stable continue aspirin for anticoagulation isosorbide and Coreg for rate contro

## 2024-06-05 ENCOUNTER — SOCIAL WORK (OUTPATIENT)
Facility: CLINIC | Age: 74
End: 2024-06-05

## 2024-06-05 ENCOUNTER — OFFICE VISIT (OUTPATIENT)
Facility: CLINIC | Age: 74
End: 2024-06-05
Payer: MEDICARE

## 2024-06-05 DIAGNOSIS — I48.91 ATRIAL FIBRILLATION, UNSPECIFIED TYPE (HCC): ICD-10-CM

## 2024-06-05 DIAGNOSIS — I50.9 ACUTE CONGESTIVE HEART FAILURE, UNSPECIFIED HEART FAILURE TYPE (HCC): ICD-10-CM

## 2024-06-05 DIAGNOSIS — I10 ESSENTIAL HYPERTENSION: Primary | Chronic | ICD-10-CM

## 2024-06-05 DIAGNOSIS — J96.01 ACUTE RESPIRATORY FAILURE WITH HYPOXIA (HCC): ICD-10-CM

## 2024-06-05 DIAGNOSIS — K64.9 ACUTE HEMORRHOID: ICD-10-CM

## 2024-06-05 PROCEDURE — 99309 SBSQ NF CARE MODERATE MDM 30: CPT | Performed by: NURSE PRACTITIONER

## 2024-06-05 NOTE — ASSESSMENT & PLAN NOTE
Patient is complaining of hemorrhoidal pain we will order placed for hemorrhoid suppository at bedtime x 10 days.

## 2024-06-05 NOTE — PROGRESS NOTES
PLACE OF SERVICE:  [facility] [address] [place] [address]    SKILLED VISIT        Chief Complaint: ESRD on dialysis, hemorrhoidal pain    HPI : Marilee Oakes is a 73 y.o. female patient seen today for for follow-up. Patient is alert and oriented able to make her needs known. Patient is complaining of painful hemorrhoids, new order placed for hemorrhoid suppository at bedtime x 10 days.  She denies signs and respiratory distress, lungs clear upon assessment. Patient has history of COPD with chronic respiratory failure she continues on 2 L of oxygen. For history of A-fib with stable rate and rhythm, she continues on Coreg twice a day and aspirin 81 mg. Patient continues on dialysis Tuesday Thursday and Saturday for history of ESRD, via right chest port access. Patient status post doxycycline  for infected left ankle fracture with hardware removal.  At previous visit patient complained of diarrhea, no reports of diarrhea voiced at  time of visit. Patient is scheduled for follow-up orthopedic appointment on 6/6/2024.  She continues on amlodipine and Isosorbide Mononitrate, blood pressure and heart rate remained within therapeutic range for history of hypertension.  She remains stable on Bumex 2 mg, will continue to monitor weights weekly and labs for signs and symptoms of dehydration.  Edema persist to lower extremities.  Patient continues on iron supplement daily, patient does have anemia at baseline with hemoglobin between 8 and 9, will continue to monitor H&H periodically. Patient continues to work with OT and PT she remains motivated and continues to participates in sessions. No further concerns reported.  She remains hemodynamically stable at time of visit. Will continue to monitor closely.    PMH:   CHF ESRD A-fib with watchman syndrome GERD hypertension,COPD Chronic respiratory failure      Social History / Family History:      Medications: Reviewed in EMR and

## 2024-06-05 NOTE — PROGRESS NOTES
Consult with Mai Cue[facility] ([facility] )    MSW had conversation with Mai regarding low-cost transportation options for patient upon discharge if dialysis is still needed.   The concern is the cost for wheelchair accessible transportation that will drive into the driveway to  patient from the rear of the house.  There is free transportation but it will only  from the curb of the home.    Mai stated that she would follow up with dialysis center to determine if there will be a need upon discharge.  Mai stated that she would also consult with PT to determine if patient is able to transfer into the car at the rear of home and be driven to the curb for Care Van services.      MSW will follow-up with Mai once additional information is obtained.    Freda LOBATO, LMSW, [facility]    Senior Care Services  [facility]   [address]   [place] [address]  Cell [phone]

## 2024-06-05 NOTE — PROGRESS NOTES
Consult with Mai Cuevas ([facility] )     MSW had conversation with Mai regarding low-cost transportation options for patient upon discharge if dialysis is still needed.   The concern is the cost for wheelchair accessible transportation that will drive into the driveway to  patient from the rear of the house.  There is free transportation but it will only  from the curb of the home.     Mai stated that she would follow up with dialysis center to determine if there will be a need upon discharge.  Mai stated that she would also consult with PT to determine if patient is able to transfer into the car at the rear of home and be driven to the curb for Care Van services.       MSW will follow-up with Mai once additional information is obtained.     Freda LOBATO, LMSW, [facility]    Senior Care Services  [facility]   [address]   [place] [address]  Cell [phone]

## 2024-06-07 ENCOUNTER — OFFICE VISIT (OUTPATIENT)
Facility: CLINIC | Age: 74
End: 2024-06-07

## 2024-06-07 DIAGNOSIS — N18.6 ANEMIA DUE TO CHRONIC KIDNEY DISEASE, ON CHRONIC DIALYSIS (HCC): ICD-10-CM

## 2024-06-07 DIAGNOSIS — D63.1 ANEMIA DUE TO CHRONIC KIDNEY DISEASE, ON CHRONIC DIALYSIS (HCC): ICD-10-CM

## 2024-06-07 DIAGNOSIS — J96.01 ACUTE RESPIRATORY FAILURE WITH HYPOXIA (HCC): ICD-10-CM

## 2024-06-07 DIAGNOSIS — Z99.2 ANEMIA DUE TO CHRONIC KIDNEY DISEASE, ON CHRONIC DIALYSIS (HCC): ICD-10-CM

## 2024-06-07 DIAGNOSIS — I48.91 ATRIAL FIBRILLATION, UNSPECIFIED TYPE (HCC): ICD-10-CM

## 2024-06-07 DIAGNOSIS — N18.6 ESRD (END STAGE RENAL DISEASE) ON DIALYSIS (HCC): Chronic | ICD-10-CM

## 2024-06-07 DIAGNOSIS — Z99.2 ESRD (END STAGE RENAL DISEASE) ON DIALYSIS (HCC): Chronic | ICD-10-CM

## 2024-06-07 DIAGNOSIS — I50.9 ACUTE CONGESTIVE HEART FAILURE, UNSPECIFIED HEART FAILURE TYPE (HCC): Primary | ICD-10-CM

## 2024-06-07 DIAGNOSIS — J44.9 CHRONIC OBSTRUCTIVE PULMONARY DISEASE, UNSPECIFIED COPD TYPE (HCC): Chronic | ICD-10-CM

## 2024-06-08 NOTE — PROGRESS NOTES
PLACE OF SERVICE:  [facility] [address] [place] [address]    SKILLED VISIT        Chief Complaint: ESRD on dialysis, hemorrhoidal pain    HPI : Marilee Oakes is a 73 y.o. female patient seen today for for follow-up. Patient is alert and oriented able to make her needs known. Patient is complaining of painful hemorrhoids, new order placed for hemorrhoid suppository at bedtime x 10 days. She denies signs and respiratory distress, lungs clear upon assessment. Patient has history of COPD with chronic respiratory failure she continues on 2 L of oxygen. For history of A-fib with stable rate and rhythm, she continues on Coreg twice a day and aspirin 81 mg. Patient continues on dialysis Tuesday Thursday and Saturday for history of ESRD, via right chest port access. Patient status post doxycycline  for infected left ankle fracture with hardware removal. At previous visit patient complained of diarrhea, no reports of diarrhea voiced at  time of visit. Patient is scheduled for follow-up orthopedic appointment on 6/6/2024. She continues on amlodipine and Isosorbide Mononitrate, blood pressure and heart rate remained within therapeutic range for history of hypertension. She remains stable on Bumex 2 mg, will continue to monitor weights weekly and labs for signs and symptoms of dehydration. Edema persist to lower extremities. Patient continues on iron supplement daily, patient does have anemia at baseline with hemoglobin between 8 and 9, will continue to monitor H&H periodically. Patient continues to work with OT and PT she remains motivated and continues to participates in sessions. No further concerns reported.  She remains hemodynamically stable at time of visit. Will continue to monitor closely.    Today reported that she is still having hemorrhoidal pain and have not received suppository ordered. Nursing staff reported that suppository needs to be approved by admin staff, form

## 2024-06-10 ENCOUNTER — OFFICE VISIT (OUTPATIENT)
Facility: CLINIC | Age: 74
End: 2024-06-10
Payer: MEDICARE

## 2024-06-10 DIAGNOSIS — I50.9 ACUTE CONGESTIVE HEART FAILURE, UNSPECIFIED HEART FAILURE TYPE (HCC): ICD-10-CM

## 2024-06-10 DIAGNOSIS — D63.1 ANEMIA DUE TO CHRONIC KIDNEY DISEASE, ON CHRONIC DIALYSIS (HCC): ICD-10-CM

## 2024-06-10 DIAGNOSIS — N18.6 ESRD (END STAGE RENAL DISEASE) ON DIALYSIS (HCC): Chronic | ICD-10-CM

## 2024-06-10 DIAGNOSIS — Z99.2 ESRD (END STAGE RENAL DISEASE) ON DIALYSIS (HCC): Chronic | ICD-10-CM

## 2024-06-10 DIAGNOSIS — T84.7XXA HARDWARE COMPLICATING WOUND INFECTION, INITIAL ENCOUNTER (HCC): ICD-10-CM

## 2024-06-10 DIAGNOSIS — J96.01 ACUTE RESPIRATORY FAILURE WITH HYPOXIA (HCC): ICD-10-CM

## 2024-06-10 DIAGNOSIS — N18.6 ANEMIA DUE TO CHRONIC KIDNEY DISEASE, ON CHRONIC DIALYSIS (HCC): ICD-10-CM

## 2024-06-10 DIAGNOSIS — Z99.2 ANEMIA DUE TO CHRONIC KIDNEY DISEASE, ON CHRONIC DIALYSIS (HCC): ICD-10-CM

## 2024-06-10 DIAGNOSIS — I48.91 ATRIAL FIBRILLATION, UNSPECIFIED TYPE (HCC): Primary | ICD-10-CM

## 2024-06-10 PROCEDURE — 1123F ACP DISCUSS/DSCN MKR DOCD: CPT | Performed by: NURSE PRACTITIONER

## 2024-06-10 PROCEDURE — 99310 SBSQ NF CARE HIGH MDM 45: CPT | Performed by: NURSE PRACTITIONER

## 2024-06-10 NOTE — ASSESSMENT & PLAN NOTE
Patient remained stable on Bumex 2 mg and aspirin 81 mg. Will continue to monitor weights weekly.

## 2024-06-10 NOTE — ASSESSMENT & PLAN NOTE
For surgical repair on 6/25/2024.  Patient is hoping to be discharged from facility by that time, if so she will need to follow-up with [facility] for presurgical testing

## 2024-06-10 NOTE — PROGRESS NOTES
PLACE OF SERVICE:  [facility] [address] [place] [address]    SKILLED VISIT        Chief Complaint: ESRD on dialysis, hemorrhoidal pain    HPI : Marilee Oakes is a 73 y.o. female patient seen today for for follow-up. Patient is alert and oriented able to make her needs known. She denies signs and respiratory distress, lungs clear upon assessment. Patient has history of COPD with chronic respiratory failure she continues on 2 L of oxygen. For history of A-fib with stable rate and rhythm, she continues on Coreg twice a day and aspirin 81 mg. Patient continues to complain of painful hemorrhoids, and stated she has not received ordered suppository.  Rectal site examined and noted with small protruding hernia, with some redness around rectal area. Suppository inserted and single ointment applied reddened area.  Patient is also complaining of loose bowel movements, she continues on Imodium 2 mg as needed. Continues on dialysis Tuesday Thursday and Saturday for history of ESRD, via right chest port access. She continues on amlodipine and Isosorbide Mononitrate, blood pressure and heart rate remained within therapeutic range for history of hypertension. She remains stable on Bumex 2 mg, edema persist to lower extremities. Patient continues on iron supplement daily, patient does have anemia at baseline with hemoglobin between 8 and 9, will continue to monitor H&H periodically. Patient continues to work with OT and PT she remains motivated and continues to participates in sessions. No further concerns reported.  She remains hemodynamically stable at time of visit. Will continue to monitor closely.  Patient status post orthopedic appointment on 6/6/2024, patient scheduled for surgical procedure of left foot on 6/25/24. Presurgical orders in chart, spouse stated that patient will not be in facility until date of surgical procedure. Notified that patient will need to follow-up with PCP

## 2024-06-11 NOTE — PERIOP NOTE
BALJINDER TO CHECK WITH OFFICE IF PAT APPOINTMENT IS NEEDED OR NOT. NOTHING MARKED ON POSTING SHEET IN EPIC.

## 2024-06-11 NOTE — PERIOP NOTE
LVM WITH SURGEON'S OFFICE TO SEE IF PATIENT NEED PAT   Rituxan Pregnancy And Lactation Text: This medication is Pregnancy Category C and it isn't know if it is safe during pregnancy. It is unknown if this medication is excreted in breast milk but similar antibodies are known to be excreted.

## 2024-06-12 ENCOUNTER — OFFICE VISIT (OUTPATIENT)
Facility: CLINIC | Age: 74
End: 2024-06-12
Payer: MEDICARE

## 2024-06-12 DIAGNOSIS — Z99.2 ANEMIA DUE TO CHRONIC KIDNEY DISEASE, ON CHRONIC DIALYSIS (HCC): Primary | ICD-10-CM

## 2024-06-12 DIAGNOSIS — Z99.2 ESRD (END STAGE RENAL DISEASE) ON DIALYSIS (HCC): Chronic | ICD-10-CM

## 2024-06-12 DIAGNOSIS — D63.1 ANEMIA DUE TO CHRONIC KIDNEY DISEASE, ON CHRONIC DIALYSIS (HCC): Primary | ICD-10-CM

## 2024-06-12 DIAGNOSIS — N18.6 ANEMIA DUE TO CHRONIC KIDNEY DISEASE, ON CHRONIC DIALYSIS (HCC): Primary | ICD-10-CM

## 2024-06-12 DIAGNOSIS — T84.7XXA HARDWARE COMPLICATING WOUND INFECTION, INITIAL ENCOUNTER (HCC): ICD-10-CM

## 2024-06-12 DIAGNOSIS — N18.6 ESRD (END STAGE RENAL DISEASE) ON DIALYSIS (HCC): Chronic | ICD-10-CM

## 2024-06-12 DIAGNOSIS — I50.9 ACUTE CONGESTIVE HEART FAILURE, UNSPECIFIED HEART FAILURE TYPE (HCC): ICD-10-CM

## 2024-06-12 DIAGNOSIS — J44.9 CHRONIC OBSTRUCTIVE PULMONARY DISEASE, UNSPECIFIED COPD TYPE (HCC): Chronic | ICD-10-CM

## 2024-06-12 DIAGNOSIS — I48.91 ATRIAL FIBRILLATION, UNSPECIFIED TYPE (HCC): ICD-10-CM

## 2024-06-12 PROCEDURE — 99309 SBSQ NF CARE MODERATE MDM 30: CPT | Performed by: NURSE PRACTITIONER

## 2024-06-12 PROCEDURE — 1123F ACP DISCUSS/DSCN MKR DOCD: CPT | Performed by: NURSE PRACTITIONER

## 2024-06-12 NOTE — ASSESSMENT & PLAN NOTE
For surgical repair on 6/25/2024.  Presurgical lab to be collected on 617/2024, EKG to be done.  Patient scheduled for cardiology evaluation on 6/20/2024.

## 2024-06-12 NOTE — PERIOP NOTE
SPOKE WITH CARLOS AT THE SURGEON'S OFFICE AND SHE STATED THAT PATIENT IS IN A FACILITY AND SHE SENT ORDERS SO PATIENT CAN HAVE PAT DONE THERE.

## 2024-06-12 NOTE — ASSESSMENT & PLAN NOTE
Patient remained stable on Bumex 2 mg and aspirin 81 mg. Will continue to monitor weights weekly.

## 2024-06-12 NOTE — PROGRESS NOTES
PLACE OF SERVICE:  [facility] [address] [place] [address]    SKILLED VISIT        Chief Complaint: ESRD on dialysis, hemorrhoidal pain    HPI : Marilee Oakes is a 73 y.o. female patient seen today for for follow-up. Patient is alert and oriented able to make her needs known. She denies signs and respiratory distress, lungs clear upon assessment. Patient has history of COPD with chronic respiratory failure she continues on 2 L of oxygen. For history of A-fib with stable rate and rhythm, she continues on Coreg twice a day and aspirin 81 mg. Patient continues to complain of painful hemorrhoids, and stated she has not received ordered suppository.  Rectal site examined and noted with small protruding hernia, with some redness around rectal area. Suppository inserted and single ointment applied reddened area.  Patient is also complaining of loose bowel movements, she continues on Imodium 2 mg as needed. Continues on dialysis Tuesday Thursday and Saturday for history of ESRD, via right chest port access. She continues on amlodipine and Isosorbide Mononitrate, blood pressure and heart rate remained within therapeutic range for history of hypertension. She remains stable on Bumex 2 mg, edema persist to lower extremities. Patient continues on iron supplement daily, patient does have anemia at baseline with hemoglobin between 8 and 9, will continue to monitor H&H periodically. Patient continues to work with OT and PT she remains motivated and continues to participates in sessions. No further concerns reported.  She remains hemodynamically stable at time of visit. Will continue to monitor closely.  Patient status post orthopedic appointment on 6/6/2024, patient scheduled for surgical procedure of left foot on 6/25/24. Spoke to spouse who has agreed to have patient stay in facility until after procedure is done. Presurgical orders in chart, Lab for CBC with diff Chem 7 PT/INR/PTT  and

## 2024-06-17 ENCOUNTER — OFFICE VISIT (OUTPATIENT)
Facility: CLINIC | Age: 74
End: 2024-06-17
Payer: MEDICARE

## 2024-06-17 DIAGNOSIS — I48.91 ATRIAL FIBRILLATION, UNSPECIFIED TYPE (HCC): ICD-10-CM

## 2024-06-17 DIAGNOSIS — J96.01 ACUTE RESPIRATORY FAILURE WITH HYPOXIA (HCC): ICD-10-CM

## 2024-06-17 DIAGNOSIS — N18.6 ANEMIA DUE TO CHRONIC KIDNEY DISEASE, ON CHRONIC DIALYSIS (HCC): ICD-10-CM

## 2024-06-17 DIAGNOSIS — Z99.2 ANEMIA DUE TO CHRONIC KIDNEY DISEASE, ON CHRONIC DIALYSIS (HCC): ICD-10-CM

## 2024-06-17 DIAGNOSIS — N18.6 ESRD (END STAGE RENAL DISEASE) ON DIALYSIS (HCC): Chronic | ICD-10-CM

## 2024-06-17 DIAGNOSIS — I50.9 ACUTE CONGESTIVE HEART FAILURE, UNSPECIFIED HEART FAILURE TYPE (HCC): Primary | ICD-10-CM

## 2024-06-17 DIAGNOSIS — Z99.2 ESRD (END STAGE RENAL DISEASE) ON DIALYSIS (HCC): Chronic | ICD-10-CM

## 2024-06-17 DIAGNOSIS — I10 ESSENTIAL HYPERTENSION: Chronic | ICD-10-CM

## 2024-06-17 DIAGNOSIS — K64.9 ACUTE HEMORRHOID: ICD-10-CM

## 2024-06-17 DIAGNOSIS — D63.1 ANEMIA DUE TO CHRONIC KIDNEY DISEASE, ON CHRONIC DIALYSIS (HCC): ICD-10-CM

## 2024-06-17 PROCEDURE — 1123F ACP DISCUSS/DSCN MKR DOCD: CPT | Performed by: NURSE PRACTITIONER

## 2024-06-17 PROCEDURE — 99309 SBSQ NF CARE MODERATE MDM 30: CPT | Performed by: NURSE PRACTITIONER

## 2024-06-17 NOTE — PROGRESS NOTES
PLACE OF SERVICE:  [facility] [address] [place] [address]    SKILLED VISIT        Chief Complaint: ESRD on dialysis, hemorrhoidal pain    HPI : Marilee Oakes is a 73 y.o. female patient seen today for for follow-up. Patient is alert and oriented able to make her needs known. She denies signs and respiratory distress, lungs clear upon assessment. Patient has history of COPD with chronic respiratory failure she continues on 2 L of oxygen. For history of A-fib with stable rate and rhythm, she continues on Coreg twice a day and aspirin 81 mg. Patient continues to complain of painful hemorrhoids, status post hemorrhoid suppository. New order placed for hemorrhoidal pads,  patient states that suppository is causing her loose bowel movements.  Patient is also complaining of increased neuropathic pain and discomfort, she continues on Lyrica 150 mg. Patient stated she was on 300 mg at home and since dose has decreased she noted pain has increase.  New order placed for Lyrica to be increased to 150 mg twice a day, will monitor for effectiveness. Continues on dialysis Tuesday Thursday and Saturday for history of ESRD, via right chest port access. She continues on amlodipine and Isosorbide Mononitrate, blood pressure and heart rate remained within therapeutic range for history of hypertension. She remains stable on Bumex 2 mg, edema persist to lower extremities. Patient continues on iron supplement daily, patient does have anemia at baseline with hemoglobin between 8 and 9, will continue to monitor H&H periodically. Patient continues to work with OT and PT she remains motivated and continues to participates in sessions. No further concerns reported. She remains hemodynamically stable at time of visit. Will continue to monitor closely. Patient is scheduled for surgical procedure of left foot on 6/25/24. Spoke to spouse who has agreed to have patient stay in facility until after

## 2024-06-17 NOTE — PERIOP NOTE
LVM ON PT'S CELL PHONE FOR HER TO CALL TO DO PAT PHONE INTERVIEW.    WHILE REVIEWING CHART, IT IS NOTED THAT [facility] [address]# [phone].  CALLED AND SPOKE W/ LORI IN SOCIAL WORK AND ADVISED THAT PT'S SURGERY IS 6/25/24 AND WE WOULD NEED HER HX, MEDICATION LIST, HT/WT AND ALLERGIES FAXED OVER FOR REVIEW AND TO UPDATE IN CC.  LORI STATES SHE WILL FAX THIS INFO OVER ONCE SHE HAS IT ALL TOGETHER.

## 2024-06-17 NOTE — ASSESSMENT & PLAN NOTE
Patient continues to complain of hemorrhoidal pain, was previously on hemorrhoidal suppository.  Patient stated her most positive results in loose bowel movements.  New order placed for hemorrhoidal pads to be placed twice a day.

## 2024-06-19 ENCOUNTER — OFFICE VISIT (OUTPATIENT)
Facility: CLINIC | Age: 74
End: 2024-06-19
Payer: MEDICARE

## 2024-06-19 DIAGNOSIS — I50.9 ACUTE CONGESTIVE HEART FAILURE, UNSPECIFIED HEART FAILURE TYPE (HCC): ICD-10-CM

## 2024-06-19 DIAGNOSIS — Z99.2 ANEMIA DUE TO CHRONIC KIDNEY DISEASE, ON CHRONIC DIALYSIS (HCC): Primary | ICD-10-CM

## 2024-06-19 DIAGNOSIS — D63.1 ANEMIA DUE TO CHRONIC KIDNEY DISEASE, ON CHRONIC DIALYSIS (HCC): Primary | ICD-10-CM

## 2024-06-19 DIAGNOSIS — N18.6 ESRD (END STAGE RENAL DISEASE) ON DIALYSIS (HCC): Chronic | ICD-10-CM

## 2024-06-19 DIAGNOSIS — J96.01 ACUTE RESPIRATORY FAILURE WITH HYPOXIA (HCC): ICD-10-CM

## 2024-06-19 DIAGNOSIS — Z99.2 ESRD (END STAGE RENAL DISEASE) ON DIALYSIS (HCC): Chronic | ICD-10-CM

## 2024-06-19 DIAGNOSIS — N18.6 ANEMIA DUE TO CHRONIC KIDNEY DISEASE, ON CHRONIC DIALYSIS (HCC): Primary | ICD-10-CM

## 2024-06-19 DIAGNOSIS — I48.91 ATRIAL FIBRILLATION, UNSPECIFIED TYPE (HCC): ICD-10-CM

## 2024-06-19 PROCEDURE — 1123F ACP DISCUSS/DSCN MKR DOCD: CPT | Performed by: NURSE PRACTITIONER

## 2024-06-19 PROCEDURE — 99309 SBSQ NF CARE MODERATE MDM 30: CPT | Performed by: NURSE PRACTITIONER

## 2024-06-19 RX ORDER — FOLIC ACID/VIT B COMPLEX AND C 0.8 MG
1 TABLET ORAL DAILY
COMMUNITY
End: 2024-07-08 | Stop reason: SDUPTHER

## 2024-06-19 RX ORDER — LIDOCAINE 5 G/100G
CREAM RECTAL; TOPICAL 2 TIMES DAILY
COMMUNITY

## 2024-06-19 RX ORDER — FLUTICASONE PROPIONATE AND SALMETEROL 250; 50 UG/1; UG/1
1 POWDER RESPIRATORY (INHALATION) EVERY 12 HOURS
COMMUNITY
End: 2024-07-08 | Stop reason: SDUPTHER

## 2024-06-19 RX ORDER — LOPERAMIDE HYDROCHLORIDE 2 MG/1
2 CAPSULE ORAL 4 TIMES DAILY PRN
COMMUNITY
End: 2024-07-08 | Stop reason: SDUPTHER

## 2024-06-19 RX ORDER — BENZONATATE 100 MG/1
100 CAPSULE ORAL 3 TIMES DAILY PRN
COMMUNITY
End: 2024-07-08 | Stop reason: SDUPTHER

## 2024-06-19 RX ORDER — FERROUS SULFATE 325(65) MG
325 TABLET ORAL
COMMUNITY
End: 2024-07-08 | Stop reason: SDUPTHER

## 2024-06-19 RX ORDER — POLYETHYLENE GLYCOL 3350 17 G/17G
17 POWDER, FOR SOLUTION ORAL DAILY PRN
COMMUNITY
End: 2024-07-08 | Stop reason: SDUPTHER

## 2024-06-19 NOTE — ASSESSMENT & PLAN NOTE
Patient continues on dialysis Tuesday Thursday Saturday without any complication via right chest access.  BUN 41.2 creatinine 2.94 as of 6/70/2024

## 2024-06-19 NOTE — ASSESSMENT & PLAN NOTE
Hemoglobin 8.4 and hematocrit 26.1 as of 6/17/2024 and increase since last hemoglobin on 5/27/2024

## 2024-06-19 NOTE — PERIOP NOTE
(LYRICA) 150 MG capsule Take 1 capsule by mouth daily for 30 days. Max Daily Amount: 150 mg (Patient taking differently: Take 1 capsule by mouth 2 times daily.)    insulin NPH (HUMULIN N;NOVOLIN N) 100 UNIT/ML injection vial Inject 8 Units into the skin daily for 3 days    amLODIPine (NORVASC) 5 MG tablet Take 1 tablet by mouth daily    naloxone (NARCAN) 4 MG/0.1ML LIQD nasal spray 1 spray by Nasal route as needed for Opioid Reversal    budesonide-formoterol (SYMBICORT) 160-4.5 MCG/ACT AERO Inhale 2 puffs into the lungs 2 times daily    aspirin 81 MG chewable tablet Take 1 tablet by mouth daily    balsum peru-castor oil (VENELEX) OINT ointment Apply topically 2 times daily    omeprazole (PRILOSEC) 40 MG delayed release capsule TAKE 1 CAPSULE EVERY DAY    nystatin (MYCOSTATIN) 464740 UNIT/GM cream Apply topically 2 times daily.    isosorbide mononitrate (IMDUR) 60 MG extended release tablet TAKE 1 TABLET EVERY DAY    carvedilol (COREG) 25 MG tablet TAKE 1 TABLET TWICE DAILY WITH MEALS    triamcinolone (KENALOG) 0.1 % cream APPLY A THIN FILM OF CREAM EXTERNALLY TO THE AFFECTED AREA TWICE DAILY    acetaminophen (TYLENOL) 500 MG tablet Take 1 tablet by mouth every 6 hours as needed    albuterol sulfate HFA (PROVENTIL;VENTOLIN;PROAIR) 108 (90 Base) MCG/ACT inhaler Inhale 1 puff into the lungs every 4 hours as needed    vitamin D 25 MCG (1000 UT) CAPS Take by mouth daily    clotrimazole-betamethasone (LOTRISONE) 1-0.05 % cream Apply topically 2 times daily    magnesium oxide (MAG-OX) 400 MG tablet Take 1 tablet by mouth 2 times daily    sodium chloride (OCEAN) 0.65 % nasal spray 2 sprays by Nasal route every 8 hours as needed        YOU MUST ONLY TAKE THESE MEDICATIONS THE MORNING OF SURGERY  AMLODIPINE, CARVEDILOL, ISOSORBIDE MONONITRATE, OMEPRAZOLE, PREGABALIN,     MEDICATIONS TO TAKE THE MORNING OF SURGERY ONLY IF NEEDED: TYLENOL: LAST DOSE TO BE 4 HOURS PRIOR TO ARRIVAL,  OCEAN NASAL SPRAY, SIMETHICONE, NARCAN,  will contact your designated visitor to inform them of your room number or to otherwise review other pertinent information regarding your care.    Social distancing practices are strongly encouraged in waiting areas and the cafeteria.       The patient was contacted via telephone.   She  NURSE TO FOLLOW UP  understanding of all instructions does need reinforcement.

## 2024-06-19 NOTE — PROGRESS NOTES
([facility])  Assessment & Plan:  Patient continues on oxygen at 2 L/min O2 sats stable, no complaints of respiratory distress   3. Acute congestive heart failure, unspecified heart failure type ([facility])  Assessment & Plan:  Patient remained stable on Bumex 2 mg and aspirin 81 mg. Will continue to monitor weights weekly  4. Atrial fibrillation, unspecified type ([facility])  Assessment & Plan:  Heart rate trends reviewed remained stable continue aspirin for anticoagulation isosorbide and Coreg for rate control    5. ESRD (end stage renal disease) on dialysis ([facility])  Assessment & Plan:  Patient continues on dialysis Tuesday Thursday Saturday without any complication via right chest access.  BUN 41.2 creatinine 2.94 as of 6/70/2024    Bel Pereira, LIZZY - CNP

## 2024-06-19 NOTE — PERIOP NOTE
RECEIVED PTS MEDICATION RECORD FROM JOSE ARMANDO, PLACED IN COMPUTER, CALLED JOSE ARMANDO SPOKE WITH VIK MARTINEZ SEC,  STATES PATIENT HAD LABS AND EKG DONE ON 06/17 WILL FAX THEM OVER FAX NUMBER GIVEN, PT ALSO HAS CARDIOLOGY APPOINTMENT ON 06/20/2024 FOR CLEARANCE FOR SURGERY.  TRANSFERRED ME TO PATIENTS ROOM NO ANSWER,  TRYING TO COMPLETE PT HISTORY

## 2024-06-21 ENCOUNTER — OFFICE VISIT (OUTPATIENT)
Facility: CLINIC | Age: 74
End: 2024-06-21

## 2024-06-21 DIAGNOSIS — Z99.2 ANEMIA DUE TO CHRONIC KIDNEY DISEASE, ON CHRONIC DIALYSIS (HCC): ICD-10-CM

## 2024-06-21 DIAGNOSIS — D63.1 ANEMIA DUE TO CHRONIC KIDNEY DISEASE, ON CHRONIC DIALYSIS (HCC): ICD-10-CM

## 2024-06-21 DIAGNOSIS — I10 ESSENTIAL HYPERTENSION: Chronic | ICD-10-CM

## 2024-06-21 DIAGNOSIS — I48.91 ATRIAL FIBRILLATION, UNSPECIFIED TYPE (HCC): ICD-10-CM

## 2024-06-21 DIAGNOSIS — N18.6 ANEMIA DUE TO CHRONIC KIDNEY DISEASE, ON CHRONIC DIALYSIS (HCC): ICD-10-CM

## 2024-06-21 DIAGNOSIS — Z95.818 PRESENCE OF WATCHMAN LEFT ATRIAL APPENDAGE CLOSURE DEVICE: Primary | Chronic | ICD-10-CM

## 2024-06-21 DIAGNOSIS — J44.9 CHRONIC OBSTRUCTIVE PULMONARY DISEASE, UNSPECIFIED COPD TYPE (HCC): Chronic | ICD-10-CM

## 2024-06-21 DIAGNOSIS — I50.9 ACUTE CONGESTIVE HEART FAILURE, UNSPECIFIED HEART FAILURE TYPE (HCC): ICD-10-CM

## 2024-06-21 NOTE — ASSESSMENT & PLAN NOTE
Hemoglobin 8.4 and hematocrit 26.1 as of 6/17/2024 and increase since last hemoglobin on 5/27/2024

## 2024-06-21 NOTE — PROGRESS NOTES
PLACE OF SERVICE:  [facility] [address] [place] [address]    SKILLED VISIT        Chief Complaint: ESRD on dialysis, hemorrhoidal pain    HPI : Marilee Oakes is a 73 y.o. female patient seen today for for follow-up. Patient is alert and oriented able to make her needs known. She denies signs and respiratory distress, lungs clear upon assessment. Patient has history of COPD with chronic respiratory failure she continues on 2 L of oxygen. For history of A-fib with stable rate and rhythm, she continues on Coreg twice a day and aspirin 81 mg.  At previous visit patient complained of painful hemorrhoids, she continues on hemorrhoidal pads without any complaints at this time.  Patient is complaining of painful urination, new order placed for urine analysis with reflex to culture to rule out UTI.  Patient encouraged to increase oral intake.  She continues on Lyrica 150 mg twice a day, for neuropathic pain..  Patient continues on  dialysis 3 days a week for history of ESRD, via right chest port access. She continues on amlodipine and Isosorbide Mononitrate, blood pressure and heart rate remained within therapeutic range for history of hypertension. She remains stable on Bumex 2 mg, edema persist to lower extremities. Patient continues on iron supplement daily, patient does have anemia at baseline with hemoglobin between 8 and 9. Current hemoglobin 8.5 and hematocrit 26.1 as of 6/17/2024, will continue to monitor H&H periodically. Patient continues to work with OT and PT she remains motivated and continues to participates in sessions. No further concerns reported. She remains hemodynamically stable at time of visit. Will continue to monitor closely. Patient is scheduled for surgical procedure of left foot on 6/25/24.  Presurgical orders in chart, Labs,  EKG and cardiology evaluation completed.cardiology recommended to stop amlodipine and continue with aspirin for stroke

## 2024-06-21 NOTE — ASSESSMENT & PLAN NOTE
As per cardiology evaluation on 6/20/2024 patient to continue aspirin for stroke prevention and watchman

## 2024-06-21 NOTE — PROGRESS NOTES
PLACE OF SERVICE:  [facility] [address] [place] [address]    SKILLED VISIT        Chief Complaint: ESRD on dialysis, hemorrhoidal pain    HPI : Marilee Oakes is a 73 y.o. female patient seen today for for follow-up. Patient is alert and oriented able to make her needs known. She denies signs and respiratory distress, lungs clear upon assessment. Patient has history of COPD with chronic respiratory failure she continues on 2 L of oxygen. For history of A-fib with stable rate and rhythm, she continues on Coreg twice a day and aspirin 81 mg.  At previous visit patient complained of painful hemorrhoids, she continues on hemorrhoidal pads without any complaints at this time.  Patient is complaining of painful urination, new order placed for urine analysis with reflex to culture to rule out UTI.  Patient encouraged to increase oral intake.  She continues on Lyrica 150 mg twice a day, for neuropathic pain..  Patient continues on  dialysis 3 days a week for history of ESRD, via right chest port access. She continues on amlodipine and Isosorbide Mononitrate, blood pressure and heart rate remained within therapeutic range for history of hypertension. She remains stable on Bumex 2 mg, edema persist to lower extremities. Patient continues on iron supplement daily, patient does have anemia at baseline with hemoglobin between 8 and 9. Current hemoglobin 8.5 and hematocrit 26.1 as of 6/17/2024, will continue to monitor H&H periodically. Patient continues to work with OT and PT she remains motivated and continues to participates in sessions. No further concerns reported. She remains hemodynamically stable at time of visit. Will continue to monitor closely. Patient is scheduled for surgical procedure of left foot on 6/25/24.  Presurgical orders in chart, Labs,  EKG and cardiology evaluation completed.cardiology recommended to stop amlodipine and continue with aspirin for stroke

## 2024-06-24 ENCOUNTER — OFFICE VISIT (OUTPATIENT)
Facility: CLINIC | Age: 74
End: 2024-06-24
Payer: MEDICARE

## 2024-06-24 DIAGNOSIS — N39.0 URINARY TRACT INFECTION WITHOUT HEMATURIA, SITE UNSPECIFIED: ICD-10-CM

## 2024-06-24 DIAGNOSIS — Z99.2 ESRD (END STAGE RENAL DISEASE) ON DIALYSIS (HCC): Chronic | ICD-10-CM

## 2024-06-24 DIAGNOSIS — J44.9 CHRONIC OBSTRUCTIVE PULMONARY DISEASE, UNSPECIFIED COPD TYPE (HCC): Chronic | ICD-10-CM

## 2024-06-24 DIAGNOSIS — N18.6 ESRD (END STAGE RENAL DISEASE) ON DIALYSIS (HCC): Chronic | ICD-10-CM

## 2024-06-24 DIAGNOSIS — Z95.818 PRESENCE OF WATCHMAN LEFT ATRIAL APPENDAGE CLOSURE DEVICE: Chronic | ICD-10-CM

## 2024-06-24 DIAGNOSIS — J96.01 ACUTE RESPIRATORY FAILURE WITH HYPOXIA (HCC): ICD-10-CM

## 2024-06-24 DIAGNOSIS — I50.9 ACUTE CONGESTIVE HEART FAILURE, UNSPECIFIED HEART FAILURE TYPE (HCC): Primary | ICD-10-CM

## 2024-06-24 DIAGNOSIS — I48.91 ATRIAL FIBRILLATION, UNSPECIFIED TYPE (HCC): ICD-10-CM

## 2024-06-24 DIAGNOSIS — I10 ESSENTIAL HYPERTENSION: Chronic | ICD-10-CM

## 2024-06-24 PROCEDURE — 1123F ACP DISCUSS/DSCN MKR DOCD: CPT | Performed by: NURSE PRACTITIONER

## 2024-06-24 PROCEDURE — 99310 SBSQ NF CARE HIGH MDM 45: CPT | Performed by: NURSE PRACTITIONER

## 2024-06-24 NOTE — PROGRESS NOTES
PLACE OF SERVICE:  [facility] [address] [place] [address]    SKILLED VISIT        Chief Complaint: ESRD on dialysis, hemorrhoidal pain    HPI : Marilee Oakes is a 73 y.o. female patient seen today for for follow-up. Patient is alert and oriented able to make her needs known. She denies signs and respiratory distress, lungs clear upon assessment. Patient has history of COPD with chronic respiratory failure she continues on 2 L of oxygen. For history of A-fib with stable rate and rhythm, she continues on Coreg twice a day and aspirin 81 mg.  At previous visit patient is complaining of painful urination, urine positive for UTI. Culture positive for Klebsiella Aerogenes, new order placed for nitrofurantoin 100 mg twice a day x 7 days. Patient scheduled for surgical procedure in a.m. call placed to provider awaiting for response to notify her of patient's Infection and what steps to take regarding antibiotic therapy.  She continues on Lyrica 150 mg twice a day, for neuropathic pain. Patient continues on  dialysis 3 days a week for history of ESRD, via right chest port access. She continues on amlodipine and Isosorbide Mononitrate, blood pressure and heart rate remained within therapeutic range for history of hypertension. She remains stable on Bumex 2 mg, edema persist to lower extremities. Patient continues on iron supplement daily, patient does have anemia at baseline with hemoglobin between 8 and 9. Current hemoglobin 8.5 and hematocrit 26.1 as of 6/17/2024, will continue to monitor H&H periodically. Patient continues to work with OT and PT she remains motivated and continues to participates in sessions. No further concerns reported. She remains hemodynamically stable at time of visit. Will continue to monitor closely. Patient is scheduled for surgical procedure of left foot on 6/25/24.  Presurgical testing completed as recommended by surgeon.    Spoke to surgical nurse Freddy

## 2024-06-25 ENCOUNTER — ANESTHESIA (OUTPATIENT)
Facility: HOSPITAL | Age: 74
End: 2024-06-25
Payer: MEDICARE

## 2024-06-25 ENCOUNTER — ANESTHESIA EVENT (OUTPATIENT)
Facility: HOSPITAL | Age: 74
End: 2024-06-25
Payer: MEDICARE

## 2024-06-25 ENCOUNTER — HOSPITAL ENCOUNTER (INPATIENT)
Facility: HOSPITAL | Age: 74
LOS: 3 days | Discharge: HOME | End: 2024-06-28
Attending: ORTHOPAEDIC SURGERY | Admitting: ORTHOPAEDIC SURGERY
Payer: MEDICARE

## 2024-06-25 ENCOUNTER — APPOINTMENT (OUTPATIENT)
Facility: HOSPITAL | Age: 74
End: 2024-06-25
Attending: ORTHOPAEDIC SURGERY
Payer: MEDICARE

## 2024-06-25 DIAGNOSIS — Z98.1 S/P ANKLE ARTHRODESIS: Primary | ICD-10-CM

## 2024-06-25 PROBLEM — M21.072 VALGUS FOOT DEFORMITY, ACQUIRED, LEFT: Status: ACTIVE | Noted: 2024-06-25

## 2024-06-25 LAB
ANION GAP BLD CALC-SCNC: 9.7 MMOL/L (ref 10–20)
CA-I BLD-MCNC: 1.18 MMOL/L (ref 1.12–1.32)
CHLORIDE BLD-SCNC: 101 MMOL/L (ref 98–107)
CO2 BLD-SCNC: 29.3 MMOL/L (ref 21–32)
CREAT BLD-MCNC: 1.79 MG/DL (ref 0.6–1.3)
GLUCOSE BLD STRIP.AUTO-MCNC: 172 MG/DL (ref 65–117)
GLUCOSE BLD-MCNC: 114 MG/DL (ref 70–110)
HGB BLD-MCNC: 7.2 G/DL (ref 11.5–16)
POTASSIUM BLD-SCNC: 3.8 MMOL/L (ref 3.5–5.1)
SERVICE CMNT-IMP: ABNORMAL
SERVICE CMNT-IMP: ABNORMAL
SODIUM BLD-SCNC: 140 MMOL/L (ref 136–145)

## 2024-06-25 PROCEDURE — 6360000002 HC RX W HCPCS: Performed by: ANESTHESIOLOGY

## 2024-06-25 PROCEDURE — 86923 COMPATIBILITY TEST ELECTRIC: CPT

## 2024-06-25 PROCEDURE — 6360000002 HC RX W HCPCS: Performed by: NURSE ANESTHETIST, CERTIFIED REGISTERED

## 2024-06-25 PROCEDURE — 2580000003 HC RX 258: Performed by: ORTHOPAEDIC SURGERY

## 2024-06-25 PROCEDURE — 7100000000 HC PACU RECOVERY - FIRST 15 MIN: Performed by: ORTHOPAEDIC SURGERY

## 2024-06-25 PROCEDURE — 6370000000 HC RX 637 (ALT 250 FOR IP): Performed by: ANESTHESIOLOGY

## 2024-06-25 PROCEDURE — 2709999900 HC NON-CHARGEABLE SUPPLY: Performed by: ORTHOPAEDIC SURGERY

## 2024-06-25 PROCEDURE — 3600000014 HC SURGERY LEVEL 4 ADDTL 15MIN: Performed by: ORTHOPAEDIC SURGERY

## 2024-06-25 PROCEDURE — 6360000002 HC RX W HCPCS: Performed by: ORTHOPAEDIC SURGERY

## 2024-06-25 PROCEDURE — 2500000003 HC RX 250 WO HCPCS: Performed by: NURSE ANESTHETIST, CERTIFIED REGISTERED

## 2024-06-25 PROCEDURE — 1100000000 HC RM PRIVATE

## 2024-06-25 PROCEDURE — A4217 STERILE WATER/SALINE, 500 ML: HCPCS | Performed by: ORTHOPAEDIC SURGERY

## 2024-06-25 PROCEDURE — 3700000001 HC ADD 15 MINUTES (ANESTHESIA): Performed by: ORTHOPAEDIC SURGERY

## 2024-06-25 PROCEDURE — 86900 BLOOD TYPING SEROLOGIC ABO: CPT

## 2024-06-25 PROCEDURE — 86901 BLOOD TYPING SEROLOGIC RH(D): CPT

## 2024-06-25 PROCEDURE — 64445 NJX AA&/STRD SCIATIC NRV IMG: CPT | Performed by: ANESTHESIOLOGY

## 2024-06-25 PROCEDURE — 3600000004 HC SURGERY LEVEL 4 BASE: Performed by: ORTHOPAEDIC SURGERY

## 2024-06-25 PROCEDURE — 36415 COLL VENOUS BLD VENIPUNCTURE: CPT

## 2024-06-25 PROCEDURE — P9045 ALBUMIN (HUMAN), 5%, 250 ML: HCPCS | Performed by: ANESTHESIOLOGY

## 2024-06-25 PROCEDURE — 82962 GLUCOSE BLOOD TEST: CPT

## 2024-06-25 PROCEDURE — P9045 ALBUMIN (HUMAN), 5%, 250 ML: HCPCS | Performed by: NURSE ANESTHETIST, CERTIFIED REGISTERED

## 2024-06-25 PROCEDURE — 6370000000 HC RX 637 (ALT 250 FOR IP): Performed by: ORTHOPAEDIC SURGERY

## 2024-06-25 PROCEDURE — 7100000001 HC PACU RECOVERY - ADDTL 15 MIN: Performed by: ORTHOPAEDIC SURGERY

## 2024-06-25 PROCEDURE — 2720000010 HC SURG SUPPLY STERILE: Performed by: ORTHOPAEDIC SURGERY

## 2024-06-25 PROCEDURE — 3700000000 HC ANESTHESIA ATTENDED CARE: Performed by: ORTHOPAEDIC SURGERY

## 2024-06-25 PROCEDURE — 80047 BASIC METABLC PNL IONIZED CA: CPT

## 2024-06-25 PROCEDURE — 64447 NJX AA&/STRD FEMORAL NRV IMG: CPT | Performed by: ANESTHESIOLOGY

## 2024-06-25 PROCEDURE — 0SGG04Z FUSION OF LEFT ANKLE JOINT WITH INTERNAL FIXATION DEVICE, OPEN APPROACH: ICD-10-PCS | Performed by: ORTHOPAEDIC SURGERY

## 2024-06-25 PROCEDURE — 2580000003 HC RX 258: Performed by: ANESTHESIOLOGY

## 2024-06-25 PROCEDURE — 2580000003 HC RX 258: Performed by: NURSE ANESTHETIST, CERTIFIED REGISTERED

## 2024-06-25 PROCEDURE — 85018 HEMOGLOBIN: CPT

## 2024-06-25 PROCEDURE — C1713 ANCHOR/SCREW BN/BN,TIS/BN: HCPCS | Performed by: ORTHOPAEDIC SURGERY

## 2024-06-25 PROCEDURE — C1769 GUIDE WIRE: HCPCS | Performed by: ORTHOPAEDIC SURGERY

## 2024-06-25 PROCEDURE — 86850 RBC ANTIBODY SCREEN: CPT

## 2024-06-25 DEVICE — IMPLANTABLE DEVICE: Type: IMPLANTABLE DEVICE | Site: ANKLE | Status: FUNCTIONAL

## 2024-06-25 DEVICE — SCREW BNE L40MM DIA5MM TI THRD LO PROF: Type: IMPLANTABLE DEVICE | Site: ANKLE | Status: FUNCTIONAL

## 2024-06-25 DEVICE — CAP END 0MM CANN LOK FOR CENTRONAIL ANK COMPR NAILING SYS: Type: IMPLANTABLE DEVICE | Site: ANKLE | Status: FUNCTIONAL

## 2024-06-25 RX ORDER — SODIUM CHLORIDE 9 MG/ML
INJECTION, SOLUTION INTRAVENOUS PRN
Status: DISCONTINUED | OUTPATIENT
Start: 2024-06-25 | End: 2024-06-28 | Stop reason: HOSPADM

## 2024-06-25 RX ORDER — SODIUM CHLORIDE 0.9 % (FLUSH) 0.9 %
5-40 SYRINGE (ML) INJECTION PRN
Status: DISCONTINUED | OUTPATIENT
Start: 2024-06-25 | End: 2024-06-25 | Stop reason: HOSPADM

## 2024-06-25 RX ORDER — LOPERAMIDE HYDROCHLORIDE 2 MG/1
2 CAPSULE ORAL 4 TIMES DAILY PRN
Status: DISCONTINUED | OUTPATIENT
Start: 2024-06-25 | End: 2024-06-28 | Stop reason: HOSPADM

## 2024-06-25 RX ORDER — BUMETANIDE 1 MG/1
2 TABLET ORAL DAILY
Status: DISCONTINUED | OUTPATIENT
Start: 2024-06-26 | End: 2024-06-26

## 2024-06-25 RX ORDER — AMLODIPINE BESYLATE 5 MG/1
5 TABLET ORAL DAILY
Status: DISCONTINUED | OUTPATIENT
Start: 2024-06-26 | End: 2024-06-26

## 2024-06-25 RX ORDER — SODIUM CHLORIDE 9 MG/ML
INJECTION, SOLUTION INTRAVENOUS CONTINUOUS
Status: DISCONTINUED | OUTPATIENT
Start: 2024-06-25 | End: 2024-06-27

## 2024-06-25 RX ORDER — SODIUM CHLORIDE, SODIUM LACTATE, POTASSIUM CHLORIDE, CALCIUM CHLORIDE 600; 310; 30; 20 MG/100ML; MG/100ML; MG/100ML; MG/100ML
INJECTION, SOLUTION INTRAVENOUS CONTINUOUS
Status: DISCONTINUED | OUTPATIENT
Start: 2024-06-25 | End: 2024-06-25 | Stop reason: HOSPADM

## 2024-06-25 RX ORDER — ROPIVACAINE HYDROCHLORIDE 5 MG/ML
INJECTION, SOLUTION EPIDURAL; INFILTRATION; PERINEURAL
Status: COMPLETED | OUTPATIENT
Start: 2024-06-25 | End: 2024-06-25

## 2024-06-25 RX ORDER — BISACODYL 5 MG/1
5 TABLET, DELAYED RELEASE ORAL DAILY PRN
Status: DISCONTINUED | OUTPATIENT
Start: 2024-06-25 | End: 2024-06-28 | Stop reason: HOSPADM

## 2024-06-25 RX ORDER — SODIUM CHLORIDE 9 MG/ML
INJECTION, SOLUTION INTRAVENOUS PRN
Status: DISCONTINUED | OUTPATIENT
Start: 2024-06-25 | End: 2024-06-25 | Stop reason: HOSPADM

## 2024-06-25 RX ORDER — PANTOPRAZOLE SODIUM 40 MG/1
40 TABLET, DELAYED RELEASE ORAL
Status: DISCONTINUED | OUTPATIENT
Start: 2024-06-26 | End: 2024-06-28 | Stop reason: HOSPADM

## 2024-06-25 RX ORDER — ONDANSETRON 2 MG/ML
4 INJECTION INTRAMUSCULAR; INTRAVENOUS
Status: DISCONTINUED | OUTPATIENT
Start: 2024-06-25 | End: 2024-06-25 | Stop reason: HOSPADM

## 2024-06-25 RX ORDER — FENTANYL CITRATE 50 UG/ML
25 INJECTION, SOLUTION INTRAMUSCULAR; INTRAVENOUS EVERY 5 MIN PRN
Status: DISCONTINUED | OUTPATIENT
Start: 2024-06-25 | End: 2024-06-25 | Stop reason: HOSPADM

## 2024-06-25 RX ORDER — BENZONATATE 100 MG/1
100 CAPSULE ORAL 3 TIMES DAILY PRN
Status: DISCONTINUED | OUTPATIENT
Start: 2024-06-25 | End: 2024-06-28 | Stop reason: HOSPADM

## 2024-06-25 RX ORDER — PREGABALIN 75 MG/1
150 CAPSULE ORAL DAILY
Status: DISCONTINUED | OUTPATIENT
Start: 2024-06-25 | End: 2024-06-26 | Stop reason: SDUPTHER

## 2024-06-25 RX ORDER — SODIUM CHLORIDE 0.9 % (FLUSH) 0.9 %
5-40 SYRINGE (ML) INJECTION EVERY 12 HOURS SCHEDULED
Status: DISCONTINUED | OUTPATIENT
Start: 2024-06-25 | End: 2024-06-28 | Stop reason: HOSPADM

## 2024-06-25 RX ORDER — NALOXONE HYDROCHLORIDE 0.4 MG/ML
INJECTION, SOLUTION INTRAMUSCULAR; INTRAVENOUS; SUBCUTANEOUS PRN
Status: DISCONTINUED | OUTPATIENT
Start: 2024-06-25 | End: 2024-06-25 | Stop reason: HOSPADM

## 2024-06-25 RX ORDER — LIDOCAINE HYDROCHLORIDE 20 MG/ML
INJECTION, SOLUTION EPIDURAL; INFILTRATION; INTRACAUDAL; PERINEURAL PRN
Status: DISCONTINUED | OUTPATIENT
Start: 2024-06-25 | End: 2024-06-25 | Stop reason: SDUPTHER

## 2024-06-25 RX ORDER — ALBUMIN, HUMAN INJ 5% 5 %
SOLUTION INTRAVENOUS PRN
Status: DISCONTINUED | OUTPATIENT
Start: 2024-06-25 | End: 2024-06-25 | Stop reason: SDUPTHER

## 2024-06-25 RX ORDER — PROCHLORPERAZINE EDISYLATE 5 MG/ML
5 INJECTION INTRAMUSCULAR; INTRAVENOUS
Status: DISCONTINUED | OUTPATIENT
Start: 2024-06-25 | End: 2024-06-25 | Stop reason: HOSPADM

## 2024-06-25 RX ORDER — ACETAMINOPHEN 500 MG
1000 TABLET ORAL ONCE
Status: COMPLETED | OUTPATIENT
Start: 2024-06-25 | End: 2024-06-25

## 2024-06-25 RX ORDER — SODIUM CHLORIDE 0.9 % (FLUSH) 0.9 %
5-40 SYRINGE (ML) INJECTION PRN
Status: DISCONTINUED | OUTPATIENT
Start: 2024-06-25 | End: 2024-06-28 | Stop reason: HOSPADM

## 2024-06-25 RX ORDER — NALOXONE HYDROCHLORIDE 0.4 MG/ML
0.4 INJECTION, SOLUTION INTRAMUSCULAR; INTRAVENOUS; SUBCUTANEOUS PRN
Status: DISCONTINUED | OUTPATIENT
Start: 2024-06-25 | End: 2024-06-28 | Stop reason: HOSPADM

## 2024-06-25 RX ORDER — CARVEDILOL 12.5 MG/1
25 TABLET ORAL 2 TIMES DAILY WITH MEALS
Status: DISCONTINUED | OUTPATIENT
Start: 2024-06-26 | End: 2024-06-28 | Stop reason: HOSPADM

## 2024-06-25 RX ORDER — ISOSORBIDE MONONITRATE 60 MG/1
60 TABLET, EXTENDED RELEASE ORAL DAILY
Status: DISCONTINUED | OUTPATIENT
Start: 2024-06-26 | End: 2024-06-28 | Stop reason: HOSPADM

## 2024-06-25 RX ORDER — MORPHINE SULFATE 2 MG/ML
2 INJECTION, SOLUTION INTRAMUSCULAR; INTRAVENOUS
Status: DISCONTINUED | OUTPATIENT
Start: 2024-06-25 | End: 2024-06-26

## 2024-06-25 RX ORDER — NEPHROCAP 1 MG
1 CAP ORAL DAILY
Status: DISCONTINUED | OUTPATIENT
Start: 2024-06-25 | End: 2024-06-28 | Stop reason: HOSPADM

## 2024-06-25 RX ORDER — MORPHINE SULFATE 2 MG/ML
4 INJECTION, SOLUTION INTRAMUSCULAR; INTRAVENOUS
Status: DISCONTINUED | OUTPATIENT
Start: 2024-06-25 | End: 2024-06-26

## 2024-06-25 RX ORDER — OXYCODONE HYDROCHLORIDE 5 MG/1
5 TABLET ORAL EVERY 4 HOURS PRN
Status: DISCONTINUED | OUTPATIENT
Start: 2024-06-25 | End: 2024-06-28 | Stop reason: HOSPADM

## 2024-06-25 RX ORDER — OXYCODONE HYDROCHLORIDE 5 MG/1
10 TABLET ORAL EVERY 4 HOURS PRN
Status: DISCONTINUED | OUTPATIENT
Start: 2024-06-25 | End: 2024-06-27

## 2024-06-25 RX ORDER — MIDAZOLAM HYDROCHLORIDE 2 MG/2ML
2 INJECTION, SOLUTION INTRAMUSCULAR; INTRAVENOUS ONCE
Status: COMPLETED | OUTPATIENT
Start: 2024-06-25 | End: 2024-06-25

## 2024-06-25 RX ORDER — MAGNESIUM OXIDE 400 MG/1
400 TABLET ORAL 2 TIMES DAILY
Status: DISCONTINUED | OUTPATIENT
Start: 2024-06-25 | End: 2024-06-25 | Stop reason: SDUPTHER

## 2024-06-25 RX ORDER — SIMETHICONE 40MG/0.6ML
20 SUSPENSION, DROPS(FINAL DOSAGE FORM)(ML) ORAL EVERY 6 HOURS PRN
Status: DISCONTINUED | OUTPATIENT
Start: 2024-06-25 | End: 2024-06-28 | Stop reason: HOSPADM

## 2024-06-25 RX ORDER — HYDROMORPHONE HYDROCHLORIDE 1 MG/ML
0.5 INJECTION, SOLUTION INTRAMUSCULAR; INTRAVENOUS; SUBCUTANEOUS EVERY 5 MIN PRN
Status: DISCONTINUED | OUTPATIENT
Start: 2024-06-25 | End: 2024-06-25 | Stop reason: HOSPADM

## 2024-06-25 RX ORDER — LANOLIN ALCOHOL/MO/W.PET/CERES
4.5 CREAM (GRAM) TOPICAL NIGHTLY
Status: DISCONTINUED | OUTPATIENT
Start: 2024-06-25 | End: 2024-06-28 | Stop reason: HOSPADM

## 2024-06-25 RX ORDER — ONDANSETRON 2 MG/ML
INJECTION INTRAMUSCULAR; INTRAVENOUS PRN
Status: DISCONTINUED | OUTPATIENT
Start: 2024-06-25 | End: 2024-06-25 | Stop reason: SDUPTHER

## 2024-06-25 RX ORDER — LIDOCAINE HYDROCHLORIDE 10 MG/ML
1 INJECTION, SOLUTION EPIDURAL; INFILTRATION; INTRACAUDAL; PERINEURAL
Status: DISCONTINUED | OUTPATIENT
Start: 2024-06-25 | End: 2024-06-25 | Stop reason: HOSPADM

## 2024-06-25 RX ORDER — FERROUS SULFATE 325(65) MG
325 TABLET ORAL
Status: DISCONTINUED | OUTPATIENT
Start: 2024-06-26 | End: 2024-06-28 | Stop reason: HOSPADM

## 2024-06-25 RX ORDER — SODIUM CHLORIDE 0.9 % (FLUSH) 0.9 %
5-40 SYRINGE (ML) INJECTION EVERY 12 HOURS SCHEDULED
Status: DISCONTINUED | OUTPATIENT
Start: 2024-06-25 | End: 2024-06-25 | Stop reason: HOSPADM

## 2024-06-25 RX ORDER — ALBUMIN, HUMAN INJ 5% 5 %
SOLUTION INTRAVENOUS
Status: DISPENSED
Start: 2024-06-25 | End: 2024-06-26

## 2024-06-25 RX ORDER — ALBUTEROL SULFATE 2.5 MG/3ML
2.5 SOLUTION RESPIRATORY (INHALATION) EVERY 4 HOURS PRN
Status: DISCONTINUED | OUTPATIENT
Start: 2024-06-25 | End: 2024-06-28 | Stop reason: HOSPADM

## 2024-06-25 RX ORDER — ONDANSETRON 2 MG/ML
4 INJECTION INTRAMUSCULAR; INTRAVENOUS EVERY 6 HOURS PRN
Status: DISCONTINUED | OUTPATIENT
Start: 2024-06-25 | End: 2024-06-28 | Stop reason: HOSPADM

## 2024-06-25 RX ORDER — OXYCODONE HYDROCHLORIDE 5 MG/1
5 TABLET ORAL
Status: DISCONTINUED | OUTPATIENT
Start: 2024-06-25 | End: 2024-06-25 | Stop reason: HOSPADM

## 2024-06-25 RX ORDER — ALBUMIN, HUMAN INJ 5% 5 %
12.5 SOLUTION INTRAVENOUS ONCE
Status: COMPLETED | OUTPATIENT
Start: 2024-06-25 | End: 2024-06-25

## 2024-06-25 RX ORDER — LANOLIN ALCOHOL/MO/W.PET/CERES
400 CREAM (GRAM) TOPICAL 2 TIMES DAILY
Status: DISCONTINUED | OUTPATIENT
Start: 2024-06-25 | End: 2024-06-28 | Stop reason: HOSPADM

## 2024-06-25 RX ORDER — DEXTROSE MONOHYDRATE 100 MG/ML
INJECTION, SOLUTION INTRAVENOUS CONTINUOUS PRN
Status: DISCONTINUED | OUTPATIENT
Start: 2024-06-25 | End: 2024-06-28 | Stop reason: HOSPADM

## 2024-06-25 RX ORDER — ASPIRIN 81 MG/1
81 TABLET, CHEWABLE ORAL DAILY
Status: DISCONTINUED | OUTPATIENT
Start: 2024-06-25 | End: 2024-06-28 | Stop reason: HOSPADM

## 2024-06-25 RX ORDER — EPHEDRINE SULFATE 50 MG/ML
INJECTION INTRAVENOUS PRN
Status: DISCONTINUED | OUTPATIENT
Start: 2024-06-25 | End: 2024-06-25 | Stop reason: SDUPTHER

## 2024-06-25 RX ORDER — ROCURONIUM BROMIDE 10 MG/ML
INJECTION, SOLUTION INTRAVENOUS PRN
Status: DISCONTINUED | OUTPATIENT
Start: 2024-06-25 | End: 2024-06-25 | Stop reason: SDUPTHER

## 2024-06-25 RX ORDER — FENTANYL CITRATE 50 UG/ML
100 INJECTION, SOLUTION INTRAMUSCULAR; INTRAVENOUS ONCE
Status: COMPLETED | OUTPATIENT
Start: 2024-06-25 | End: 2024-06-25

## 2024-06-25 RX ADMIN — FENTANYL CITRATE 100 MCG: 50 INJECTION INTRAMUSCULAR; INTRAVENOUS at 12:56

## 2024-06-25 RX ADMIN — ROPIVACAINE HYDROCHLORIDE 10 ML: 5 INJECTION, SOLUTION EPIDURAL; INFILTRATION; PERINEURAL at 13:02

## 2024-06-25 RX ADMIN — ALBUMIN (HUMAN) 12.5 G: 12.5 INJECTION, SOLUTION INTRAVENOUS at 14:12

## 2024-06-25 RX ADMIN — SODIUM CHLORIDE: 9 INJECTION, SOLUTION INTRAVENOUS at 20:30

## 2024-06-25 RX ADMIN — ALBUMIN (HUMAN) 12.5 G: 12.5 INJECTION, SOLUTION INTRAVENOUS at 18:10

## 2024-06-25 RX ADMIN — PROPOFOL 100 MG: 10 INJECTION, EMULSION INTRAVENOUS at 13:43

## 2024-06-25 RX ADMIN — ROCURONIUM BROMIDE 40 MG: 10 INJECTION, SOLUTION INTRAVENOUS at 13:44

## 2024-06-25 RX ADMIN — EPHEDRINE SULFATE 10 MG: 50 INJECTION INTRAVENOUS at 17:29

## 2024-06-25 RX ADMIN — OXYCODONE 10 MG: 5 TABLET ORAL at 22:57

## 2024-06-25 RX ADMIN — MORPHINE SULFATE 4 MG: 2 INJECTION, SOLUTION INTRAMUSCULAR; INTRAVENOUS at 21:50

## 2024-06-25 RX ADMIN — WATER 2000 MG: 1 INJECTION INTRAMUSCULAR; INTRAVENOUS; SUBCUTANEOUS at 21:50

## 2024-06-25 RX ADMIN — ONDANSETRON 4 MG: 2 INJECTION INTRAMUSCULAR; INTRAVENOUS at 17:07

## 2024-06-25 RX ADMIN — ROPIVACAINE HYDROCHLORIDE 20 ML: 5 INJECTION, SOLUTION EPIDURAL; INFILTRATION; PERINEURAL at 12:53

## 2024-06-25 RX ADMIN — ACETAMINOPHEN 1000 MG: 500 TABLET ORAL at 12:41

## 2024-06-25 RX ADMIN — MIDAZOLAM 2 MG: 1 INJECTION INTRAMUSCULAR; INTRAVENOUS at 12:51

## 2024-06-25 RX ADMIN — SODIUM CHLORIDE: 9 INJECTION, SOLUTION INTRAVENOUS at 21:48

## 2024-06-25 RX ADMIN — WATER 2000 MG: 1 INJECTION INTRAMUSCULAR; INTRAVENOUS; SUBCUTANEOUS at 14:00

## 2024-06-25 RX ADMIN — PHENYLEPHRINE HYDROCHLORIDE 80 MCG: 10 INJECTION INTRAVENOUS at 13:47

## 2024-06-25 RX ADMIN — LIDOCAINE HYDROCHLORIDE 40 MG: 20 INJECTION, SOLUTION EPIDURAL; INFILTRATION; INTRACAUDAL; PERINEURAL at 13:43

## 2024-06-25 RX ADMIN — SODIUM CHLORIDE: 9 INJECTION, SOLUTION INTRAVENOUS at 12:22

## 2024-06-25 ASSESSMENT — PAIN DESCRIPTION - ORIENTATION
ORIENTATION: LEFT
ORIENTATION: LEFT

## 2024-06-25 ASSESSMENT — PAIN DESCRIPTION - LOCATION
LOCATION: FOOT
LOCATION: FOOT

## 2024-06-25 ASSESSMENT — PAIN DESCRIPTION - DESCRIPTORS
DESCRIPTORS: ACHING
DESCRIPTORS: ACHING

## 2024-06-25 ASSESSMENT — PAIN SCALES - GENERAL
PAINLEVEL_OUTOF10: 10
PAINLEVEL_OUTOF10: 10

## 2024-06-25 ASSESSMENT — PAIN - FUNCTIONAL ASSESSMENT: PAIN_FUNCTIONAL_ASSESSMENT: 0-10

## 2024-06-25 NOTE — FLOWSHEET NOTE
06/25/24 1416   Family Communication    Relationship to Patient Spouse    Phone Number [phone]   Family/Significant Other Update Called;Updated   Delivery Origin Nurse   Message Disposition Family present - message delivered   Update Given Yes   Family Communication   Family Update Message Procedure started;Patient stable

## 2024-06-25 NOTE — PERIOP NOTE
TRANSFER - OUT REPORT:    Verbal report given to Kristie (name) on Marilee Oakes  being transferred to 565 (unit) for routine post-op       Report consisted of patient’s Situation, Background, Assessment and   Recommendations(SBAR).     Time Pre op antibiotic given:1400  Anesthesia Stop time: 1730    Information from the following report(s) SBAR, OR Summary, Procedure Summary, Intake/Output, MAR, and Cardiac Rhythm V-Fib  was reviewed with the receiving nurse.    Opportunity for questions and clarification was provided.     Is the patient on 02? No       L/Min RA       Other N/A    Is the patient on a monitor? No    Is the nurse transporting with the patient? No    Surgical Waiting Area notified of patient's transfer from PACU? No I spoke to pt's  who is @ home @ 6:30 pm

## 2024-06-25 NOTE — ANESTHESIA PRE PROCEDURE
intra-cardiac shunt is seen on color doppler.  9) No intra-cardiac thrombus or vegetation is seen on the valves; no definitive thrombus nor vegetation is seen on the PM leads.  10) The visualized portions of the ascending aorta appears normal; the aortic arch, and descending aorta have mild atherosclerosis but no mobile atheroma.          Neuro/Psych:   (+) neuromuscular disease:, psychiatric history:depression/anxiety              ROS comment: Ataxia  Diabetic polyneuropathy GI/Hepatic/Renal:   (+) GERD:, renal disease (CRI, Stage III): CRI and ARF, morbid obesity         ROS comment: IBS (irritable bowel syndrome)    Hx Chronic cholecystitis s/p cholecystectomy  Hx Appendectomy.   Endo/Other:    (+) DiabetesType II DM, poorly controlled, blood dyscrasia (Hgb = 7.5 on 4/30/24): anemia:., electrolyte abnormalities.                 Abdominal:   (+) obese          Vascular:           ROS comment: Venous stasis dermatitis of both lower extremities. Other Findings: Abdominal exam deferred        Anesthesia Plan      general and regional     ASA 3       Induction: intravenous.    MIPS: Prophylactic antiemetics administered.  Anesthetic plan and risks discussed with patient.    Use of blood products discussed with patient whom consented to blood products.    Plan discussed with CRNA.    Attending anesthesiologist reviewed and agrees with Preprocedure content      Post-op pain plan if not by surgeon: single peripheral nerve block    Last HD this AM, discussed risks associated with regional anesthesia. Pt has neuropathy in her feet, discussed potential prolonged numbness in her ankle and foot. Pt understands and agrees to proceed      Keagan Vásquez MD   6/25/2024            
No

## 2024-06-25 NOTE — ANESTHESIA PROCEDURE NOTES
Peripheral Block    Patient location during procedure: pre-op  Reason for block: post-op pain management and at surgeon's request  Start time: 6/25/2024 12:45 PM  End time: 6/25/2024 12:53 PM  Staffing  Performed: anesthesiologist   Anesthesiologist: Colin Mohan MD  Performed by: Colin Mohan MD  Authorized by: Colin Mohan MD    Preanesthetic Checklist  Completed: patient identified, IV checked, site marked, risks and benefits discussed, surgical/procedural consents, equipment checked, pre-op evaluation, timeout performed, anesthesia consent given, oxygen available, monitors applied/VS acknowledged and fire risk safety assessment completed and verbalized  Peripheral Block   Prep: ChloraPrep  Provider prep: mask and sterile gloves  Patient monitoring: cardiac monitor, continuous pulse ox, frequent blood pressure checks, IV access and oxygen  Block type: Sciatic  Popliteal  Laterality: left  Injection technique: single-shot  Guidance: nerve stimulator and ultrasound guided    Needle   Needle type: insulated echogenic nerve stimulator needle   Needle gauge: 21 G  Needle localization: anatomical landmarks and ultrasound guidance  Needle length: 10 cm  Assessment   Injection assessment: negative aspiration for heme, no paresthesia on injection, local visualized surrounding nerve on ultrasound and no intravascular symptoms  Paresthesia pain: none  Slow fractionated injection: yes  Hemodynamics: stable  Outcomes: uncomplicated and patient tolerated procedure well    Medications Administered  ropivacaine (NAROPIN) injection 0.5% - Perineural   20 mL - 6/25/2024 12:53:00 PM

## 2024-06-25 NOTE — ANESTHESIA POSTPROCEDURE EVALUATION
Post-Anesthesia Evaluation and Assessment    Patient: Marilee Oakes MRN: [id number]  SSN: [id number]    YOB: 1950  Age: 73 y.o.  Sex: female      I have evaluated the patient and they are stable and ready for discharge from the PACU.     Cardiovascular Function/Vital Signs  Visit Vitals  BP (!) 107/58   Pulse 80   Temp 97.2 °F (36.2 °C) (Oral)   Resp 20   Ht 1.753 m (5' 9\")   Wt 108.4 kg (239 lb)   SpO2 98%   BMI 35.29 kg/m²       Patient is status post General anesthesia for Procedure(s):  LEFT ANKLE AND SUBTALAR JOINT  ARTHRODESIS WITH RETROGRADE TIBIATALOCALCANEAL NAIL, FUSION RECONSTRUCTION, ANKLE ARTHROSCOPIC DEBRIDEMENT.    Nausea/Vomiting: None    Postoperative hydration reviewed and adequate.    Pain:      Managed    Neurological Status:       At baseline    Mental Status, Level of Consciousness: Alert and  oriented to person, place, and time    Pulmonary Status:       Adequate oxygenation and airway patent    Complications related to anesthesia: None    Post-anesthesia assessment completed. No concerns    Signed By: Colin Mohan MD     June 25, 2024            Department of Anesthesiology  Postprocedure Note    Patient: Marilee Oakes  MRN: [id number]  YOB: 1950  Date of evaluation: 6/25/2024    Procedure Summary       Date: 06/25/24 Room / Location: [facility] MAIN OR [address] [place] [address] MAIN OR    Anesthesia Start: 1326 Anesthesia Stop: 1730    Procedure: LEFT ANKLE AND SUBTALAR JOINT  ARTHRODESIS WITH RETROGRADE TIBIATALOCALCANEAL NAIL, FUSION RECONSTRUCTION, ANKLE ARTHROSCOPIC DEBRIDEMENT (Left: Ankle) Diagnosis:       Closed displaced bimalleolar fracture of left lower leg with malunion      Valgus foot, left      Left ankle pain, unspecified chronicity      Closed bimalleolar fracture of left ankle, initial encounter      Inadequately controlled diabetes mellitus (HCC)      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

## 2024-06-25 NOTE — BRIEF OP NOTE
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  Closed displaced bimalleolar fracture of left lower leg with nonunion/malunion ;  2.  History of surgery ORIF bimalleolar by another surgeon.   3.  Closed bimalleolar fracture of left ankle, sequela;  4. Poorly controlled diabetes mellitus type 2.       Procedure(s):  LEFT ANKLE AND SUBTALAR JOINT  ARTHRODESIS WITH RETROGRADE TIBIATALOCALCANEAL NAIL, FUSION RECONSTRUCTION, ANKLE ARTHROSCOPIC DEBRIDEMENT OF TIBIOTALAR JOINT.    Surgeon(s):  Maame Chan MD    Assistant:  Surgical Assistant: José Antonio Gates    Anesthesia: General    Estimated Blood Loss (mL): Minimal    Complications: None    Specimens:   * No specimens in log *    Implants:  Implant Name Type Inv. Item Serial No.  Lot No. LRB No. Used Action   NAIL IM MICHELLE [id number] MM ANK HINDFOOT COMPR TI CENTRONAIL - SNA  NAIL IM MICHELLE 10X200 MM ANK HINDFOOT COMPR TI CENTRONAIL NA [employer]- [id number] Left 1 Implanted   SCREW BNE L40MM DIA5MM TI THRD LO PROF - SNA  SCREW BNE L40MM DIA5MM TI THRD LO PROF NA [employer]-WD [id number] Left 1 Implanted   SCREW BNE L35MM DIA5MM TI THRD LO PROF - SNA  SCREW BNE L35MM DIA5MM TI THRD LO PROF NA [employer]WD [id number] Left 1 Implanted   SCREW BNE L25MM DIA5MM TI THRD LO PROF - SNA  SCREW BNE L25MM DIA5MM TI THRD LO PROF NA [employer]WD [id number] Left 1 Implanted   SCREW BNE [id number] TI THRD LO PROF ST ACN - SNA  SCREW BNE [id number] TI

## 2024-06-25 NOTE — ANESTHESIA PROCEDURE NOTES
Peripheral Block    Patient location during procedure: pre-op  Reason for block: post-op pain management and at surgeon's request  Start time: 6/25/2024 12:55 PM  End time: 6/25/2024 1:02 PM  Staffing  Performed: anesthesiologist   Anesthesiologist: Colin Mohan MD  Performed by: Colin Mohan MD  Authorized by: Colin Mohan MD    Preanesthetic Checklist  Completed: patient identified, IV checked, site marked, risks and benefits discussed, surgical/procedural consents, equipment checked, pre-op evaluation, timeout performed, anesthesia consent given, oxygen available, monitors applied/VS acknowledged and fire risk safety assessment completed and verbalized  Peripheral Block   Patient position: supine  Prep: ChloraPrep  Provider prep: mask and sterile gloves  Patient monitoring: cardiac monitor, continuous pulse ox, frequent blood pressure checks, IV access and oxygen  Block type: Saphenous  Laterality: left  Injection technique: single-shot  Guidance: ultrasound guided  Local infiltration: ropivacaine  Infiltration strength: 0.5 %  Local infiltration: ropivacaineDose: 30 mL    Needle   Needle type: insulated echogenic nerve stimulator needle   Needle gauge: 21 G  Needle localization: anatomical landmarks and ultrasound guidance  Needle length: 10 cm  Assessment   Injection assessment: negative aspiration for heme, no paresthesia on injection, local visualized surrounding nerve on ultrasound and no intravascular symptoms  Paresthesia pain: none  Slow fractionated injection: yes  Hemodynamics: stable  Outcomes: uncomplicated and patient tolerated procedure well    Medications Administered  ropivacaine (NAROPIN) injection 0.5% - Perineural   10 mL - 6/25/2024 1:02:00 PM

## 2024-06-25 NOTE — ASSESSMENT & PLAN NOTE
At previous visit patient complained of dysuria and urine collected and sent for UA and C&S.   Urine positive for Klebsiella, new order placed for nitrofurantoin 100 mg twice a day x 7 days

## 2024-06-26 LAB
ALBUMIN SERPL-MCNC: 2.7 G/DL (ref 3.5–5)
ALBUMIN/GLOB SERPL: 0.9 (ref 1.1–2.2)
ALP SERPL-CCNC: 110 U/L (ref 45–117)
ALT SERPL-CCNC: 8 U/L (ref 12–78)
ANION GAP SERPL CALC-SCNC: 4 MMOL/L (ref 5–15)
ANION GAP SERPL CALC-SCNC: 6 MMOL/L (ref 5–15)
AST SERPL-CCNC: 13 U/L (ref 15–37)
BASOPHILS # BLD: 0 K/UL (ref 0–0.1)
BASOPHILS NFR BLD: 0 % (ref 0–1)
BILIRUB SERPL-MCNC: 0.3 MG/DL (ref 0.2–1)
BUN SERPL-MCNC: 30 MG/DL (ref 6–20)
BUN SERPL-MCNC: 31 MG/DL (ref 6–20)
BUN/CREAT SERPL: 12 (ref 12–20)
BUN/CREAT SERPL: 13 (ref 12–20)
CALCIUM SERPL-MCNC: 8.4 MG/DL (ref 8.5–10.1)
CALCIUM SERPL-MCNC: 8.5 MG/DL (ref 8.5–10.1)
CHLORIDE SERPL-SCNC: 108 MMOL/L (ref 97–108)
CHLORIDE SERPL-SCNC: 109 MMOL/L (ref 97–108)
CO2 SERPL-SCNC: 25 MMOL/L (ref 21–32)
CO2 SERPL-SCNC: 27 MMOL/L (ref 21–32)
CREAT SERPL-MCNC: 2.42 MG/DL (ref 0.55–1.02)
CREAT SERPL-MCNC: 2.48 MG/DL (ref 0.55–1.02)
DIFFERENTIAL METHOD BLD: ABNORMAL
EOSINOPHIL # BLD: 0.4 K/UL (ref 0–0.4)
EOSINOPHIL NFR BLD: 5 % (ref 0–7)
ERYTHROCYTE [DISTWIDTH] IN BLOOD BY AUTOMATED COUNT: 18.4 % (ref 11.5–14.5)
EST. AVERAGE GLUCOSE BLD GHB EST-MCNC: 111 MG/DL
GLOBULIN SER CALC-MCNC: 3 G/DL (ref 2–4)
GLUCOSE BLD STRIP.AUTO-MCNC: 138 MG/DL (ref 65–117)
GLUCOSE BLD STRIP.AUTO-MCNC: 151 MG/DL (ref 65–117)
GLUCOSE BLD STRIP.AUTO-MCNC: 155 MG/DL (ref 65–117)
GLUCOSE BLD STRIP.AUTO-MCNC: 168 MG/DL (ref 65–117)
GLUCOSE BLD STRIP.AUTO-MCNC: 179 MG/DL (ref 65–117)
GLUCOSE SERPL-MCNC: 183 MG/DL (ref 65–100)
GLUCOSE SERPL-MCNC: 187 MG/DL (ref 65–100)
HBA1C MFR BLD: 5.5 % (ref 4–5.6)
HCT VFR BLD AUTO: 23.2 % (ref 35–47)
HCT VFR BLD AUTO: 25 % (ref 35–47)
HGB BLD-MCNC: 7 G/DL (ref 11.5–16)
HGB BLD-MCNC: 7.2 G/DL (ref 11.5–16)
IMM GRANULOCYTES # BLD AUTO: 0 K/UL (ref 0–0.04)
IMM GRANULOCYTES NFR BLD AUTO: 0 % (ref 0–0.5)
LYMPHOCYTES # BLD: 0.7 K/UL (ref 0.8–3.5)
LYMPHOCYTES NFR BLD: 10 % (ref 12–49)
MCH RBC QN AUTO: 25.1 PG (ref 26–34)
MCHC RBC AUTO-ENTMCNC: 28.8 G/DL (ref 30–36.5)
MCV RBC AUTO: 87.1 FL (ref 80–99)
MONOCYTES # BLD: 0.6 K/UL (ref 0–1)
MONOCYTES NFR BLD: 9 % (ref 5–13)
NEUTS SEG # BLD: 5.3 K/UL (ref 1.8–8)
NEUTS SEG NFR BLD: 76 % (ref 32–75)
NRBC # BLD: 0 K/UL (ref 0–0.01)
NRBC BLD-RTO: 0 PER 100 WBC
PHOSPHATE SERPL-MCNC: 4.1 MG/DL (ref 2.6–4.7)
PLATELET # BLD AUTO: 219 K/UL (ref 150–400)
PMV BLD AUTO: 11.2 FL (ref 8.9–12.9)
POTASSIUM SERPL-SCNC: 4.1 MMOL/L (ref 3.5–5.1)
POTASSIUM SERPL-SCNC: 4.1 MMOL/L (ref 3.5–5.1)
PROT SERPL-MCNC: 5.7 G/DL (ref 6.4–8.2)
RBC # BLD AUTO: 2.87 M/UL (ref 3.8–5.2)
RBC MORPH BLD: ABNORMAL
RBC MORPH BLD: ABNORMAL
SERVICE CMNT-IMP: ABNORMAL
SODIUM SERPL-SCNC: 139 MMOL/L (ref 136–145)
SODIUM SERPL-SCNC: 140 MMOL/L (ref 136–145)
WBC # BLD AUTO: 7 K/UL (ref 3.6–11)

## 2024-06-26 PROCEDURE — APPNB30 APP NON BILLABLE TIME 0-30 MINS: Performed by: NURSE PRACTITIONER

## 2024-06-26 PROCEDURE — 82962 GLUCOSE BLOOD TEST: CPT

## 2024-06-26 PROCEDURE — 85014 HEMATOCRIT: CPT

## 2024-06-26 PROCEDURE — 97165 OT EVAL LOW COMPLEX 30 MIN: CPT

## 2024-06-26 PROCEDURE — 97530 THERAPEUTIC ACTIVITIES: CPT

## 2024-06-26 PROCEDURE — 51798 US URINE CAPACITY MEASURE: CPT

## 2024-06-26 PROCEDURE — 6370000000 HC RX 637 (ALT 250 FOR IP): Performed by: ORTHOPAEDIC SURGERY

## 2024-06-26 PROCEDURE — 6360000002 HC RX W HCPCS: Performed by: NURSE PRACTITIONER

## 2024-06-26 PROCEDURE — P9047 ALBUMIN (HUMAN), 25%, 50ML: HCPCS | Performed by: NURSE PRACTITIONER

## 2024-06-26 PROCEDURE — 6370000000 HC RX 637 (ALT 250 FOR IP): Performed by: NURSE PRACTITIONER

## 2024-06-26 PROCEDURE — 2700000000 HC OXYGEN THERAPY PER DAY

## 2024-06-26 PROCEDURE — 94760 N-INVAS EAR/PLS OXIMETRY 1: CPT

## 2024-06-26 PROCEDURE — APPNB60 APP NON BILLABLE TIME 46-60 MINS: Performed by: NURSE PRACTITIONER

## 2024-06-26 PROCEDURE — 6360000002 HC RX W HCPCS: Performed by: ORTHOPAEDIC SURGERY

## 2024-06-26 PROCEDURE — 85025 COMPLETE CBC W/AUTO DIFF WBC: CPT

## 2024-06-26 PROCEDURE — 2580000003 HC RX 258: Performed by: ORTHOPAEDIC SURGERY

## 2024-06-26 PROCEDURE — 80053 COMPREHEN METABOLIC PANEL: CPT

## 2024-06-26 PROCEDURE — 36415 COLL VENOUS BLD VENIPUNCTURE: CPT

## 2024-06-26 PROCEDURE — 97162 PT EVAL MOD COMPLEX 30 MIN: CPT

## 2024-06-26 PROCEDURE — 85018 HEMOGLOBIN: CPT

## 2024-06-26 PROCEDURE — 83036 HEMOGLOBIN GLYCOSYLATED A1C: CPT

## 2024-06-26 PROCEDURE — 97535 SELF CARE MNGMENT TRAINING: CPT

## 2024-06-26 PROCEDURE — 1100000000 HC RM PRIVATE

## 2024-06-26 PROCEDURE — 84100 ASSAY OF PHOSPHORUS: CPT

## 2024-06-26 RX ORDER — INSULIN LISPRO 100 [IU]/ML
0-4 INJECTION, SOLUTION INTRAVENOUS; SUBCUTANEOUS
Status: DISCONTINUED | OUTPATIENT
Start: 2024-06-26 | End: 2024-06-28 | Stop reason: HOSPADM

## 2024-06-26 RX ORDER — PREGABALIN 75 MG/1
150 CAPSULE ORAL
Status: DISCONTINUED | OUTPATIENT
Start: 2024-06-26 | End: 2024-06-28 | Stop reason: HOSPADM

## 2024-06-26 RX ORDER — DEXTROSE MONOHYDRATE 100 MG/ML
INJECTION, SOLUTION INTRAVENOUS CONTINUOUS PRN
Status: DISCONTINUED | OUTPATIENT
Start: 2024-06-26 | End: 2024-06-28 | Stop reason: HOSPADM

## 2024-06-26 RX ORDER — INSULIN LISPRO 100 [IU]/ML
0-4 INJECTION, SOLUTION INTRAVENOUS; SUBCUTANEOUS NIGHTLY
Status: DISCONTINUED | OUTPATIENT
Start: 2024-06-26 | End: 2024-06-28 | Stop reason: HOSPADM

## 2024-06-26 RX ORDER — ALBUMIN (HUMAN) 12.5 G/50ML
25 SOLUTION INTRAVENOUS ONCE
Status: COMPLETED | OUTPATIENT
Start: 2024-06-26 | End: 2024-06-26

## 2024-06-26 RX ORDER — MORPHINE SULFATE 2 MG/ML
2 INJECTION, SOLUTION INTRAMUSCULAR; INTRAVENOUS
Status: DISCONTINUED | OUTPATIENT
Start: 2024-06-26 | End: 2024-06-27

## 2024-06-26 RX ADMIN — SODIUM CHLORIDE, PRESERVATIVE FREE 10 ML: 5 INJECTION INTRAVENOUS at 09:01

## 2024-06-26 RX ADMIN — OXYCODONE 10 MG: 5 TABLET ORAL at 20:37

## 2024-06-26 RX ADMIN — Medication 400 MG: at 08:45

## 2024-06-26 RX ADMIN — WATER 2000 MG: 1 INJECTION INTRAMUSCULAR; INTRAVENOUS; SUBCUTANEOUS at 05:58

## 2024-06-26 RX ADMIN — Medication 400 MG: at 20:38

## 2024-06-26 RX ADMIN — ALBUMIN (HUMAN) 25 G: 0.25 INJECTION, SOLUTION INTRAVENOUS at 11:47

## 2024-06-26 RX ADMIN — MORPHINE SULFATE 2 MG: 2 INJECTION, SOLUTION INTRAMUSCULAR; INTRAVENOUS at 00:32

## 2024-06-26 RX ADMIN — OXYCODONE HYDROCHLORIDE 5 MG: 5 TABLET ORAL at 15:04

## 2024-06-26 RX ADMIN — ASPIRIN 81 MG CHEWABLE TABLET 81 MG: 81 TABLET CHEWABLE at 08:44

## 2024-06-26 RX ADMIN — ASPIRIN 81 MG CHEWABLE TABLET 81 MG: 81 TABLET CHEWABLE at 02:57

## 2024-06-26 RX ADMIN — CARVEDILOL 25 MG: 12.5 TABLET, FILM COATED ORAL at 19:10

## 2024-06-26 RX ADMIN — PANTOPRAZOLE SODIUM 40 MG: 40 TABLET, DELAYED RELEASE ORAL at 05:58

## 2024-06-26 RX ADMIN — FERROUS SULFATE TAB 325 MG (65 MG ELEMENTAL FE) 325 MG: 325 (65 FE) TAB at 08:45

## 2024-06-26 RX ADMIN — PREGABALIN 150 MG: 75 CAPSULE ORAL at 20:38

## 2024-06-26 RX ADMIN — SODIUM CHLORIDE, PRESERVATIVE FREE 10 ML: 5 INJECTION INTRAVENOUS at 20:39

## 2024-06-26 RX ADMIN — SODIUM CHLORIDE: 9 INJECTION, SOLUTION INTRAVENOUS at 06:08

## 2024-06-26 RX ADMIN — Medication 4.5 MG: at 20:37

## 2024-06-26 RX ADMIN — Medication 1 MG: at 08:45

## 2024-06-26 ASSESSMENT — PAIN SCALES - GENERAL
PAINLEVEL_OUTOF10: 6
PAINLEVEL_OUTOF10: 2
PAINLEVEL_OUTOF10: 7
PAINLEVEL_OUTOF10: 8

## 2024-06-26 ASSESSMENT — PAIN DESCRIPTION - ORIENTATION
ORIENTATION: LEFT
ORIENTATION: RIGHT;LEFT

## 2024-06-26 ASSESSMENT — PAIN DESCRIPTION - LOCATION
LOCATION: FOOT
LOCATION: HEAD;LEG

## 2024-06-26 ASSESSMENT — PAIN DESCRIPTION - DESCRIPTORS
DESCRIPTORS: ACHING
DESCRIPTORS: ACHING

## 2024-06-26 NOTE — CONSULTS
Seen and examined  Thanks for the consult  A/P:  New ESRD ,HD at her rehab ? TTS  PC  Hypotension  Anemia  S/p ankle surgery    Labs today  Dc amlodipine and bumex  Plan for HD in AM unless issues with K today  Decrease IVF  OTTO  Discussed with her

## 2024-06-26 NOTE — PLAN OF CARE
Problem: Pain  Goal: Verbalizes/displays adequate comfort level or baseline comfort level  6/26/2024 1323 by Germain Topete RN  Outcome: Progressing  6/26/2024 1106 by Germain Topete RN  Outcome: Progressing     Problem: Safety - Adult  Goal: Free from fall injury  6/26/2024 1323 by Germain Topete RN  Outcome: Progressing  6/26/2024 1106 by Germain Topete RN  Outcome: Progressing     Problem: Discharge Planning  Goal: Discharge to home or other facility with appropriate resources  6/26/2024 1323 by Germain Topete RN  Outcome: Progressing  6/26/2024 1106 by Germain Topete RN  Outcome: Progressing

## 2024-06-26 NOTE — PLAN OF CARE
Problem: Physical Therapy - Adult  Goal: By Discharge: Performs mobility at highest level of function for planned discharge setting.  See evaluation for individualized goals.  Description: FUNCTIONAL STATUS PRIOR TO ADMISSION: Patient was non-ambulatory prior to admission.  Unsure of prior level of function and pt is a questionable historian.  Per chart review pt was using a sliding board for transfers and a wheelchair as primary means for functional mobility.   Also noted 2 leno lift slings in her wheelchair and likely most recently requiring a leno lift for transfers.     HOME SUPPORT PRIOR TO ADMISSION: Pt residing at [facility] prior to admission.  Pt reports she lives with her spouse at baseline.     Physical Therapy Goals  Initiated 6/26/2024  1.  Patient will move from supine to sit and sit to supine and roll side to side in bed with moderate assistance within 7 day(s).   2.  Patient will transfer from bed to chair and chair to bed with moderate assistance x 2 using a sliding board within 7 day(s).      Outcome: Progressing   PHYSICAL THERAPY EVALUATION    Patient: Marilee Oakes (73 y.o. 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) [E11.44]  Valgus foot deformity, acquired, left [M21.072]  Procedure(s) (LRB):  LEFT ANKLE AND SUBTALAR JOINT  ARTHRODESIS WITH RETROGRADE TIBIATALOCALCANEAL NAIL, FUSION RECONSTRUCTION, ANKLE ARTHROSCOPIC DEBRIDEMENT (Left) 1 Day Post-Op   Precautions: Restrictions/Precautions: Weight Bearing, Fall Risk, Surgical protocol   Lower Extremity Weight Bearing Restrictions  Left Lower Extremity Weight Bearing: Non Weight  Jeremy Oakes Jr., APRN - NP [facility]    IR TUNNELED CATHETER PLACEMENT GREATER THAN 5 YEARS  05/09/2024    IR TUNNELED CATHETER PLACEMENT GREATER THAN 5 YEARS 5/9/2024 Claudette Cochran, LIZZY - NP [facility]    LEG SURGERY Left 04/30/2024    ANKLE INCISION AND DRAINAGE WITH HARDWARE REMOVAL performed by Edgardo Farnsworth DO at [facility]    [facility] STEROTACT[facility] LOC BREAST BIOPSY RIGHT Right 05/10/2022    FRANCISCA STEROTACTIC LOC BREAST BIOPSY RIGHT 5/10/2022 [facility]    PACEMAKER      OH UNLISTED PROCEDURE CARDIAC SURGERY      stent       Home Situation:  Social/Functional History  Lives With: Other (comment), Spouse  Home Layout: One level  Home Access: Level entry  Bathroom Shower/Tub: Tub/Shower unit  Bathroom Equipment: Tub transfer bench  Home Equipment: Wheelchair - Manual, Rollator  Receives Help From: Family  ADL Assistance: Needs assistance  Homemaking Assistance: Needs assistance  Ambulation Assistance: Needs assistance  Transfer Assistance: Needs assistance  Active : No  Mode of Transportation: Van    Cognitive/Behavioral Status:  Orientation  Overall Orientation Status: Impaired  Orientation Level: Disoriented to place;Oriented to time however required repetition of question to state year;Disoriented to situation;Oriented to person    Skin: ace wrap and splint LLE         Strength:    Strength: Grossly decreased, non-functional, does not use RLE to assist with transfer  BUE generally decreased, functional however requires cueing to engage in using UE to assist with transfers    Tone & Sensation:   Tone: Normal  Sensation: Intact    Coordination:  Coordination: Generally decreased, functional    Range Of Motion:  AROM: Generally decreased, functional  PROM: Generally decreased, functional    Functional Mobility:  Bed Mobility:     Bed Mobility Training  Bed Mobility Training: Yes  Overall Level of Assistance: Maximum assistance;Assist X2  Interventions: Safety awareness training;Verbal  complexity level: Medium

## 2024-06-26 NOTE — CARE COORDINATION
Care Management Initial Assessment       RUR:37%  Readmission? No  1st IM letter given? Yes - 6/26/24  1st  letter given: Yes  Disposition-To return to [facility] to continue therapy when medically stable CM to contact Ori prior to discharge at [phone]    Transportation-by   F/U with PCP/Specialist    Home O2 at 2 L    S/P LEFT ANKLE AND SUBTALAR JOINT  ARTHRODESIS WITH RETROGRADE TIBIATALOCALCANEAL NAIL, FUSION RECONSTRUCTION, ANKLE ARTHROSCOPIC DEBRIDEMENT OF TIBIOTALAR JOINT on 6/25/24       CM met with patient and her  W[place] to discuss discharge planning. Patient lived with her  in their private residence before being admitted to [facility] on 5/18/24 for therapy following a hospital stay at [facility]. Patient needs assistance with her ADLs and IADLs following a fall in March this year. Her  verified her demographic information and did not voice any discharge barriers.     CM will continue to follow for ANGELIQUE. ALONDRA Orellana MSA, RN, CM     06/26/24 [id number]   Service Assessment   Patient Orientation Person;Place;Self;Situation;Other (see comment)  (Groggy from pain med)   Cognition Alert   Primary Caregiver Spouse   Support Systems Spouse/Significant Other;Family Members   PCP Verified by CM Yes   Last Visit to PCP Within last 3 months   Prior Functional Level Assistance with the following:;Bathing;Dressing;Cooking;Housework;Shopping;Mobility   Current Functional Level Assistance with the following:;Bathing;Dressing;Cooking;Housework;Shopping;Mobility   Can patient return to prior living arrangement Yes   Ability to make needs known: Good   Family able to assist with home care needs: Yes   Would you like for me to discuss the discharge plan with any other family members/significant others, and if so, who? Yes   Financial Resources Medicare   Social/Functional History   Lives With Other (comment);Spouse  (Patient in [facility] for therapy)   Home Layout One level   Home Access Level

## 2024-06-26 NOTE — OP NOTE
and exsanguinated with an Esmarch, tourniquet inflated.  Initial pin was inserted from the plantar heel for injury from the calcaneus, talus and tibial shaft across the former joints that were now prepared.  The alignment of the pin was checked multiple times by fluoroscopic views obtained, and once the position good, proceeded to the rest of procedure to insert the nail across the joints.      An incision was made at the plantar foot in line or spanning the pin that was inserted.  Next, the entry reamer inserted to ream the outer part and then the reamers for the tibial preparation.  Once the reamers were sized up to the correct diameter, chatter in the tibial shaft was good at a 10 mm diameter nail.  The 120 mm length TTC nail was inserted attached to the guide device for the screw insertion.  The position of the nail was confirmed good by fluoroscopic views obtained.  All 3 views obtained confirmed excellent position of the tibial nail inserted from the calcaneus and into the center of the talus and into the center of the tibia.  Next multiple multiple screws were inserted starting with a talus screw inserted from medial to lateral using the guide device, then 2 screws medial to lateral into the tibia.  Finally a screw from lateral to medial calcaneus and posterior to anterior calcaneus.  Once there was excellent fixation, all look good by radiographs obtained, the final views were saved.  All of the screws were completed after incisions have been made for entry of the drill and then the screw.  The length of the screws were measured and inserted without complications.  The incisions that were made for the screw insertion, and for the nail insertion at the plantar aspect were then all repaired with nylon suture with exception of the plantar foot incision, which was repaired with Monocryl and nylon.  Excellent repair of all incisions was completed.      There was a small draining wound from the lateral incision  from her previous surgery, had clear drainage and that was covered during the entire procedure after the extremity prepped with Betadine and covered with Ioban throughout the procedure.  The Ioban over the draining lateral long incision was removed after all the surgical incisions made for the nail insertion were covered with new dressing.  The tourniquet was deflated.  There was no significant bleeders found and there was satisfactory capillary refill to the foot and toes.  There was excellent position to the foot and ankle and excellent stability after completion of procedure.  Final fluoroscopic views obtained were saved.    The leg was cleaned and dry.  Sterile dressing and a well-padded splint were applied.  The patient was awakened from anesthesia, found stable, and transferred to recovery room in same stable condition.  No surgical or anesthetic complications during this procedure and all sponge and needle counts were correct at the end of procedure.    Surgical assistants were necessary for this procedure to help hold the limb, assist in retracting, and repair of all incisions.    TOURNIQUET TIME:  120 minutes total; 275 mmHg left thigh tourniquet.    DRAINS:  None.    DISPOSITION:  Stable.          MD NATALIIA ODOM/TAJ  D:  06/26/2024 12:59:49  T:  06/26/2024 16:56:51  JOB #:  [id number]

## 2024-06-26 NOTE — CONSULTS
[address]  [address]                              CONSULTATION      PATIENT NAME: NEDRA WEBER              : 1950  MED REC NO: [id number]                       ROOM: 565  ACCOUNT NO: [id number]                       ADMIT DATE: 2024  PROVIDER: Karla Meraz MD    DATE OF SERVICE:  2024    ATTENDING PHYSICIAN:  GIL DYER    REASON FOR CONSULTATION:  The patient is seen for end-stage renal disease.  Thanks for the consult.    HISTORY OF PRESENT ILLNESS:  We had the pleasure of seeing the patient.  She came from her nursing home [facility], multiple medical problems.  Main reason she came in for left lower extremity fracture to be managed by Orthopedics.  The patient's fairly new slot of dialysis, she dialyzed at [facility].  I do not have a lot of information about that.    PAST MEDICAL HISTORY:  PermCath, end-stage renal disease, diabetes, hypertension, COPD, history of CHF.    SOCIAL HISTORY:  Reviewed.    FAMILY HISTORY:  Reviewed.    REVIEW OF SYSTEMS:  As noted in the HPI.  Other systems negative.    ALLERGIES:  SULFA, DAPTOMYCIN, AMOXICILLIN.    MEDICATIONS:  Inpatient as follow:  Albumin, amlodipine, Bumex 2 mg daily, iron, Ancef, IV fluid 100 mL an hour.    PHYSICAL EXAMINATION:  GENERAL:  Not in distress, very forgetful.  VITAL SIGNS:  BP is 90/61, on 1 L of oxygen.  EXTREMITIES:  Trace edema, left lower extremity.  Checked PermCath.    LABORATORY DATA:  As follows:  No labs from today except for hemoglobin of 7.    IMPRESSION:    1. History of end-stage renal disease, on hemodialysis.  Not sure if she has end-stage renal disease or HILARY.  Supposedly dialyze TTS at rehab place.  2. PermCath.  3. Anemia.  4. Hypotension.  5. COPD.  6. Status post ankle surgery.    RECOMMENDATIONS:    1. Stop amlodipine and Bumex.  2. Decrease IV fluid.  3. Labs today.  4. Plan for dialysis tomorrow.  5. We will also

## 2024-06-26 NOTE — H&P
[facility] Adult  Hospitalist Group    Hospitalist Consult  Primary Care Provider: Mariela Marino MD  Consult requested by: Dr Chan    Subjective:     Marilee Oakes is a 73 y.o. female with a past medical history significant for HFpEF, ESRD on HD, hypertension, COPD with chronic respiratory failure on home oxygen at 2 L, A-fib status post Watchman, chronic lower extremity edema, neuropathy, diabetes, anemia of chronic disease who presents with closed displaced bimalleolar fracture of the left lower leg with malunion. Onset of symptoms was in March following a fall and hospital stay at [facility] following which patient was discharged to [facility].  Patient is currently admitted to the orthopedic service and is postop day 1 status post   Left ankle and subtalar joint arthrodesis with retrograde tibiotalar calcaneal nail, fusion reconstruction, ankle arthroscopic debridement.  We are asked to see the patient in consult to assist in managing diabetes and other medical issues.    Review of patient's chart did not reveal any intraoperative or postoperative complications. At this time the patient has no complaints except for pain in the left lower extremity.  The patient is somewhat groggy however able to verify medications and her past medical history.     She denies any headaches or dizziness. Denies any cough, chest pain, shortness of breath. Denies fever or chills. Denies nausea, vomiting, diarrhea, constipation.     Review of Systems:    Musculoskeletal:positive for bone pain     Past Medical History:   Diagnosis Date    Acute and chronic respiratory failure with hypoxia (HCC)     Age-related osteoporosis without current pathological fracture     Age-related osteoporosis without current pathological fracture     Anemia in chronic kidney disease     Anxiety and depression 01/22/2018    Arthritis     OA    Asthma     UNCOMPLICATED    Atherosclerotic heart disease of native coronary artery with  Take 1 tablet by mouth daily 12/28/23   Grazyna Lim MD   balsum peru-castor oil (VENELEX) OINT ointment Apply topically 2 times daily 12/27/23   Grazyna Lim MD   omeprazole (PRILOSEC) 40 MG delayed release capsule TAKE 1 CAPSULE EVERY DAY 12/6/23   Parish Dumont APRN - NP   nystatin (MYCOSTATIN) 823082 UNIT/GM cream Apply topically 2 times daily. 12/5/23   Parish Dumont APRN - NP   isosorbide mononitrate (IMDUR) 60 MG extended release tablet TAKE 1 TABLET EVERY DAY 11/20/23   Parish Dumont APRN - NP   carvedilol (COREG) 25 MG tablet TAKE 1 TABLET TWICE DAILY WITH MEALS 11/20/23   Parish Dumont APRN - NP   triamcinolone (KENALOG) 0.1 % cream APPLY A THIN FILM OF CREAM EXTERNALLY TO THE AFFECTED AREA TWICE DAILY 10/25/23   Parish Dumont APRN - NP   acetaminophen (TYLENOL) 500 MG tablet Take 1 tablet by mouth every 6 hours as needed    Automatic Reconciliation, Ar   albuterol sulfate HFA (PROVENTIL;VENTOLIN;PROAIR) 108 (90 Base) MCG/ACT inhaler Inhale 1 puff into the lungs every 4 hours as needed 8/17/19   Automatic Reconciliation, Ar   vitamin D 25 MCG (1000 UT) CAPS Take by mouth daily    Automatic Reconciliation, Ar   clotrimazole-betamethasone (LOTRISONE) 1-0.05 % cream Apply topically 2 times daily    Automatic Reconciliation, Ar   magnesium oxide (MAG-OX) 400 MG tablet Take 1 tablet by mouth 2 times daily 9/29/20   Automatic Reconciliation, Ar   sodium chloride (OCEAN) 0.65 % nasal spray 2 sprays by Nasal route every 8 hours as needed    Automatic Reconciliation, Ar     Allergies   Allergen Reactions    Sulfa Antibiotics Swelling    Daptomycin Rash    Amoxicillin Swelling      Family History   Problem Relation Age of Onset    Hypertension Mother     COPD Child     COPD Brother     Hypertension Brother     High Cholesterol Brother     COPD Sister     Thyroid Disease Sister     Hypertension Sister     High Cholesterol Sister     Alcohol Abuse Father     Heart Disease

## 2024-06-26 NOTE — PLAN OF CARE
Problem: Occupational Therapy - Adult  Goal: By Discharge: Performs self-care activities at highest level of function for planned discharge setting.  See evaluation for individualized goals.  Description: FUNCTIONAL STATUS PRIOR TO ADMISSION:  Patient was not ambulatory using w/c  Receives Help From: Family, ADL Assistance: Needs assistance,  ,  ,  ,  ,  , Homemaking Assistance: Needs assistance, Ambulation Assistance: Needs assistance, Transfer Assistance: Needs assistance, Active : No     HOME SUPPORT: Pt admitted from [facility].    Occupational Therapy Goals:  Initiated 6/26/2024  1.  Patient will perform grooming with Set-up within 7 day(s).  2.  Patient will perform upper body dressing with Set-up within 7 day(s).  3.  Patient will perform lower body dressing (using reacher) seated on EOB, rolling side to side to don underwear over hips with Moderate Assist within 7 day(s).  4.  Patient will perform toilet transfers with Maximal Assist and Assist x1  within 7 day(s).  5.  Patient will perform all aspects of toileting with Maximal Assist and Assist x1 within 7 day(s).  6.  Patient will participate in upper extremity therapeutic exercise/activities with Minimal Assist for 10 minutes within 7 day(s).    7.  Patient will utilize energy conservation techniques during functional activities with verbal cues within 7 day(s).   6/26/2024 1437 by Buffy Burr, OT  Outcome: Progressing  OCCUPATIONAL THERAPY EVALUATION    Patient: Marilee Oakes (73 y.o. 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 User may not have seen previous data.)  Cognition  Overall Cognitive Status: Exceptions  Arousal/Alertness: Appropriate responses to stimuli  Following Commands: Follows one step commands with repetition;Inconsistently follows commands  Attention Span: Difficulty attending to directions;Difficulty dividing attention  Memory: Impaired;Decreased recall of recent events;Decreased short term memory  Safety Judgement: Decreased awareness of need for assistance;Decreased awareness of need for safety  Problem Solving: Decreased awareness of errors;Assistance required to identify errors made  Insights: Decreased awareness of deficits  Initiation: Requires cues for all  Sequencing: Requires cues for all    Skin:     Edema:     Hearing:        Vision/Perceptual:        Wears glasses          Range of Motion:   AROM: Generally decreased, functional         Strength:  Strength: Generally decreased, functional      Coordination:  Coordination: Within functional limits     Coordination: Generally decreased, functional      Tone & Sensation:   Tone: Normal             Functional Mobility and Transfers for ADLs:    Bed Mobility:     Bed Mobility Training  Bed Mobility Training: Yes  Overall Level of Assistance: Maximum assistance;Assist X2  Interventions: Safety awareness training;Verbal cues  Rolling: Maximum assistance;Assist X2  Supine to Sit: Maximum assistance;Assist X2  Scooting: Maximum assistance    Transfers:      Transfer Training  Transfer Training: Yes  Overall Level of Assistance: Maximum assistance;Assist X2  Interventions: Manual cues;Tactile cues;Verbal cues  Sliding Board: Maximum

## 2024-06-26 NOTE — DISCHARGE INSTRUCTIONS
INSTRUCTIONS FOLLOWING FOOT SURGERY    ACTIVITY   Elevate feet for 48 hours.   Use ice as directed by your doctor.   Use crutches as directed by your doctor.    DIET   Clear liquids until no nausea or vomiting; then light diet for the first day   Advance to regular diet on second day, unless your doctor orders otherwise.    PAIN   Take pain medication as directed by your doctor.   Call your doctor if pain is NOT relieved by medication.   DO NOT take aspirin or blood thinners until directed by your doctor.    DRESSING CARE Keep your dressing dry at all times - you may want to purchase a cast bag from the drug store to help with this    FOLLOW-UP PHONE CALLS   Calls will be made by nursing staff.   If you have any problems, call your doctor as needed.    CALL YOUR DOCTOR IF   Excessive bleeding that does not stop after holding mild pressure over the area   Temperature of 101°F or above   Redness, excessive swelling or bruising, andlor green or yellow, smelly discharge from incision   Loss of sensation -- cold, white, or blue toes    AFTER ANESTHESIA   For the first 24 hours: DO NOT Drive, Drink alcoholic beverages, or Make important decisions.   Be aware of dizziness following anesthesia and while taking pain medication.        MEDICATIONS:  Continue home medications as previously prescribed with the following changes: if you have a cast or splint on then take one 325 mg aspirin every day as long as you have no history of GI ulcer   If you take coumadin, eliquis, xarelto, or plavix you may resume these medications 1 day after surgery and take them as your doctor presribes.        APPOINTMENT DATE/TIME: Keep follow up appointment as previously arranged.    Your doctor’s phone number: [phone]              Discharge SNF/Rehab Instructions/LTAC       PATIENT ID: Marilee Oakes  MRN: [id number]   YOB: 1950    DATE OF ADMISSION: 6/25/2024 10:41 AM    DATE OF DISCHARGE: 6/28/2024    PRIMARY CARE PROVIDER:  Mariela Marino MD       ATTENDING PHYSICIAN: Nika Pham MD  DISCHARGING PROVIDER: David M Milligram, PA-C     To contact this individual call [phone] and ask the  to page.   If unavailable ask to be transferred the Adult Hospitalist Department.    CONSULTATIONS: IP CONSULT TO HOSPITALIST  IP CONSULT TO CASE MANAGEMENT  IP CONSULT TO HOSPITALIST  IP CONSULT TO CASE MANAGEMENT  IP CONSULT TO DIABETES MANAGEMENT  IP CONSULT TO NEPHROLOGY    PROCEDURES/SURGERIES: Procedure(s):  LEFT ANKLE AND SUBTALAR JOINT  ARTHRODESIS WITH RETROGRADE TIBIATALOCALCANEAL NAIL, FUSION RECONSTRUCTION, ANKLE ARTHROSCOPIC DEBRIDEMENT    ADMITTING DIAGNOSES & HOSPITAL COURSE:   Marilee Oakes is a 73 y.o. female with a past medical history significant for HFpEF, ESRD on HD, hypertension, COPD with chronic respiratory failure on home oxygen at 2 L, A-fib status post Watchman, chronic lower extremity edema, neuropathy, diabetes, anemia of chronic disease who presents with closed displaced bimalleolar fracture of the left lower leg with malunion. Onset of symptoms was in March following a fall and hospital stay at [facility] following which patient was discharged to [facility].  Patient is currently admitted to the orthopedic service and is postop day 1 status post   Left ankle and subtalar joint arthrodesis with retrograde tibiotalar calcaneal nail, fusion reconstruction, ankle arthroscopic debridement.  We are asked to see the patient in consult to assist in managing diabetes and other medical issues.     DISCHARGE DIAGNOSES / PLAN:       Left ankle fracture s/p ORIF with nonunion s/p left ankle retrograde IM nail 6/26  - PRN Norco at discharge  - Follow up with orthopedic surgeon  - PT/OT     HTN  HFpEF  A-fib s/p watchman  - Continue Coreg, isosorbide and asa  - Resume bumex at discharge     COPD with chronic respiratory failure  - on home oxygen 2lnc, currently at baseline  - albuterol and tessalon prn     ESRD

## 2024-06-26 NOTE — CONSULTS
[facility]  PROGRAM FOR DIABETES HEALTH  DIABETES MANAGEMENT CONSULT    Consulted by Nursing for advanced nursing evaluation and care for inpatient blood glucose management.    Evaluation and Action Plan   Marilee Oakes is a 73 y.o. female admitted s/p orthopedic surgery on left ankle after falling - PTA has been recuperating at [facility]. Post op course uncomplicated thus far.  Current A1C pending however may not be accurate given anemia of chronic disease associated with ESRD. BG trends appearing to correlate-     Diabetes self mgmt practices deferred at initial consult due to other healthcare providers in room -     6/26: BG  trends remain in target.  Home NPH ordered for this evening. Low dose corrective scale in place. Would continue current regimen as ordered.     Diabetes mgmt will peripherally follow while patient is hospitalized, hospitalist team also following along.     Management Rationale Action Plan   Medication   Basal needs Using PTA basal insulin  NPH 8 units QHS   Nutritional needs Covers carb load in meals NA   Corrective insulin Using LOW sensitivity based on ESRD ACHS   Lab [x]        Hemoglobin [id number]     Additional orders  Carb consistent diet (60g CHO/meal)       Diabetes Discharge Plan   Medication  RESUME PTA NPH dose          Initial Presentation   Marilee Oakes is a 73 y.o. female who is s/p orthopedic surgery  on 6/25/24 for Closed displaced bimalleolar fracture of left lower leg with malunion .  S/p LEFT ANKLE AND SUBTALAR JOINT  ARTHRODESIS WITH RETROGRADE TIBIATALOCALCANEAL NAIL, FUSION RECONSTRUCTION, ANKLE ARTHROSCOPIC DEBRIDEMENT OF TIBIOTALAR JOINT.     HX:   Past Medical History:   Diagnosis Date    Acute and chronic respiratory failure with hypoxia (HCC)     Age-related osteoporosis without current pathological fracture     Age-related osteoporosis without current pathological fracture     Anemia in chronic kidney disease     Anxiety and depression 01/22/2018    Arthritis      OA    Asthma     UNCOMPLICATED    Atherosclerotic heart disease of native coronary artery with angina pectoris ([facility])     CAD (coronary artery disease)     Chronic systolic heart failure ([facility])     CKD stage G3b/A1, GFR 30-44 and albumin creatinine ratio <30 mg/g ([facility])     STAGE 3B    COPD (chronic obstructive pulmonary disease) ([facility])     WITH (ACUTE) EXACERATION    Dependence on renal dialysis ([facility])     Dependence on supplemental oxygen     Difficulty in walking, not elsewhere classified     Essential hypertension     GERD (gastroesophageal reflux disease)     Hemorrhoids     PAIN    History of diverticulitis     diverticulitis    History of echocardiogram 11/2021    LV: Estimated LVEF is 40 - 45%. Visually measured ejection fraction. Normal cavity size and wall thickness. Globally reduced systolic function.  LA: Moderately dilated left atrium. Left atrial appendage is completely occluded. A Watchman left atrial appendage occluder is present and completely occludes the appendage.    History of migraine     History of MRSA infection     Hypercholesterolemia 01/22/2018    Hypertensive heart and chronic kidney disease with heart failure and stage 1 through stage 4 chronic kidney disease, or unspecified chronic kidney disease (HCC)     Immunodeficiency, unspecified ([facility])     Irritable bowel syndrome     IBS, spinal stenosis    Long term (current) use of insulin ([facility])     Long-term use of high-risk medication 01/22/2018    Lumbar spinal stenosis     Metabolic encephalopathy     Morbid (severe) obesity due to excess calories ([facility])     Muscle weakness (generalized)     Neuropathy     Obesity (BMI 30-39.9) 04/30/2019    Obstructive sleep apnea     uses CPAP    Permanent atrial fibrillation ([facility])     Presence of implantable cardioverter-defibrillator (ICD)     Presence of other cardiac implants and grafts     PACEMAKER    Presence of Watchman left atrial appendage closure device     Primary generalized (osteo)arthritis      Spinal stenosis, lumbar region, without neurogenic claudication     Thrombocytopenia (HCC)     Type 2 diabetes mellitus with diabetic neuropathy, without long-term current use of insulin ([facility]) 06/05/2016    Venous insufficiency     Vitamin B12 deficiency         INITIAL DX: Closed displaced bimalleolar fracture of left lower leg with malunion [S82.842P]  Valgus foot, left [M21.072]  Left ankle pain, unspecified chronicity [M25.572]  Closed bimalleolar fracture of left ankle, initial encounter [S82.842A]  Inadequately controlled diabetes mellitus ([facility]) [E11.65]  Diabetic amyotrophy associated with type 2 diabetes mellitus ([facility]) [E11.44]  Valgus foot deformity, acquired, left [M21.072]     Current Treatment     TX: orthopedic post op care    Hospital Course   Clinical progress has been uncomplicated    Diabetes History   Type Diabetes-Type 2  Ambulatory BG management provided by:   Family History:    Lab Results   Component Value Date    LABA1C 5.8 (H) 04/06/2024    LABA1C 5.2 10/27/2023    LABA1C 5.1 07/11/2023     Diabetes-related Medical History  Neurological complications  Peripheral neuropathy  Microvascular disease  Gvedwjlicfu-OSPC-TH dependant (T/TH/S)  Macrovascular disease  CAD  Other associated conditions     HFpEF/HTN/ COPD-02 dependant/ afib with watchman/ anemia of chronic disease    Diabetes Medication History  Diabetes drug class Diabetes drug name Additional Comments   Insulin NPH 8 units daily      Diabetes self-management practices:   Eating pattern   [] Eating a carbohydrate-controlled meal plan  [] Breakfast    [] Lunch     [] Dinner     [] Bedtime    [] Snacks    [] Beverages    [] Dentition status   Physical activity pattern   [] Employing a physical activity program to control BG    Monitoring pattern   [] Testing BGs sufficiently to inform self-management adjustments  [] Breakfast    [] Lunch     [] Dinner     [] Bedtime    Taking medications pattern  [] Consistent

## 2024-06-26 NOTE — PROGRESS NOTES
Ortho NP Note    POD# 1  s/p LEFT ANKLE AND SUBTALAR JOINT  ARTHRODESIS WITH RETROGRADE TIBIATALOCALCANEAL NAIL, FUSION RECONSTRUCTION, ANKLE ARTHROSCOPIC DEBRIDEMENT   Pt seen with  at bedside.     Pt resting in bed. Groggy, easily wakes to voice.   Initially rough night with pain, received 2 doses of Morphine overnight and pain much improved this morning.   Reports numbness in BLE at baseline, hx neuropathy   Tolerating regular diet. No nausea.   No complaints.      VSS Afebrile. 2L NC     Visit Vitals  BP 90/61   Pulse 87   Temp 98.5 °F (36.9 °C) (Oral)   Resp 16   Ht 1.753 m (5' 9\")   Wt 108.4 kg (239 lb)   SpO2 99%   BMI 35.29 kg/m²       Voiding status: voiding/purwick          Labs    Lab Results   Component Value Date/Time    HGB 7.0 06/26/2024 07:14 AM      Lab Results   Component Value Date/Time    INR 1.2 05/14/2024 05:47 PM      Lab Results   Component Value Date/Time     05/18/2024 07:46 AM    K 4.6 05/18/2024 07:46 AM     05/18/2024 07:46 AM    CO2 27 05/18/2024 07:46 AM    BUN 52 05/18/2024 07:46 AM     Recent Glucose Results:   Glucose   Date Value Ref Range Status   05/18/2024 114 (H) 65 - 100 mg/dL Final   05/15/2024 132 (H) 65 - 100 mg/dL Final   05/14/2024 235 (H) 65 - 100 mg/dL Final           Body mass index is 35.29 kg/m². : A BMI > 30 is classified as obesity and > 40 is classified as morbid obesity.     Skin is pale.   Splint dressing c.d.I - elevated on pillows. No significant swelling   Cryotherapy in place over splint  Calves soft and supple; No pain with passive stretch  BLE numbness at baseline, reports hx neuropathy. Sensation present proximal to splint.   SCDs for mechanical DVT proph while in bed     PLAN:  1) PT BID, OT - NWB  2) Pain control - scheduled tylenol, and prn  oxycodone  .  3) Post op care - Continue bowel regimen, encouraged IS. Straight cath per protocol.  Splint dressing  to remain in place until follow up  days unless integrity is lost.  Keep

## 2024-06-27 LAB
ANION GAP SERPL CALC-SCNC: 7 MMOL/L (ref 5–15)
BASOPHILS # BLD: 0 K/UL (ref 0–0.1)
BASOPHILS NFR BLD: 0 % (ref 0–1)
BUN SERPL-MCNC: 35 MG/DL (ref 6–20)
BUN/CREAT SERPL: 14 (ref 12–20)
CALCIUM SERPL-MCNC: 9.3 MG/DL (ref 8.5–10.1)
CHLORIDE SERPL-SCNC: 111 MMOL/L (ref 97–108)
CO2 SERPL-SCNC: 23 MMOL/L (ref 21–32)
CREAT SERPL-MCNC: 2.57 MG/DL (ref 0.55–1.02)
DIFFERENTIAL METHOD BLD: ABNORMAL
EOSINOPHIL # BLD: 0.3 K/UL (ref 0–0.4)
EOSINOPHIL NFR BLD: 5 % (ref 0–7)
ERYTHROCYTE [DISTWIDTH] IN BLOOD BY AUTOMATED COUNT: 18.3 % (ref 11.5–14.5)
GLUCOSE BLD STRIP.AUTO-MCNC: 140 MG/DL (ref 65–117)
GLUCOSE BLD STRIP.AUTO-MCNC: 165 MG/DL (ref 65–117)
GLUCOSE BLD STRIP.AUTO-MCNC: 181 MG/DL (ref 65–117)
GLUCOSE BLD STRIP.AUTO-MCNC: 194 MG/DL (ref 65–117)
GLUCOSE SERPL-MCNC: 148 MG/DL (ref 65–100)
HBV SURFACE AG SER QL: <0.1 INDEX
HBV SURFACE AG SER QL: NEGATIVE
HCT VFR BLD AUTO: 21.8 % (ref 35–47)
HGB BLD-MCNC: 6.5 G/DL (ref 11.5–16)
HISTORY CHECK: NORMAL
IMM GRANULOCYTES # BLD AUTO: 0.1 K/UL (ref 0–0.04)
IMM GRANULOCYTES NFR BLD AUTO: 1 % (ref 0–0.5)
LYMPHOCYTES # BLD: 0.6 K/UL (ref 0.8–3.5)
LYMPHOCYTES NFR BLD: 10 % (ref 12–49)
MCH RBC QN AUTO: 25.8 PG (ref 26–34)
MCHC RBC AUTO-ENTMCNC: 29.8 G/DL (ref 30–36.5)
MCV RBC AUTO: 86.5 FL (ref 80–99)
MONOCYTES # BLD: 0.4 K/UL (ref 0–1)
MONOCYTES NFR BLD: 8 % (ref 5–13)
NEUTS SEG # BLD: 4.1 K/UL (ref 1.8–8)
NEUTS SEG NFR BLD: 76 % (ref 32–75)
NRBC # BLD: 0 K/UL (ref 0–0.01)
NRBC BLD-RTO: 0 PER 100 WBC
PLATELET # BLD AUTO: 167 K/UL (ref 150–400)
PLATELET COMMENT: ABNORMAL
PMV BLD AUTO: 11 FL (ref 8.9–12.9)
POTASSIUM SERPL-SCNC: 4 MMOL/L (ref 3.5–5.1)
RBC # BLD AUTO: 2.52 M/UL (ref 3.8–5.2)
RBC MORPH BLD: ABNORMAL
RBC MORPH BLD: ABNORMAL
SERVICE CMNT-IMP: ABNORMAL
SODIUM SERPL-SCNC: 141 MMOL/L (ref 136–145)
WBC # BLD AUTO: 5.5 K/UL (ref 3.6–11)

## 2024-06-27 PROCEDURE — 6370000000 HC RX 637 (ALT 250 FOR IP): Performed by: NURSE PRACTITIONER

## 2024-06-27 PROCEDURE — 30233N1 TRANSFUSION OF NONAUTOLOGOUS RED BLOOD CELLS INTO PERIPHERAL VEIN, PERCUTANEOUS APPROACH: ICD-10-PCS | Performed by: FAMILY MEDICINE

## 2024-06-27 PROCEDURE — 1100000000 HC RM PRIVATE

## 2024-06-27 PROCEDURE — 82962 GLUCOSE BLOOD TEST: CPT

## 2024-06-27 PROCEDURE — 6370000000 HC RX 637 (ALT 250 FOR IP): Performed by: ORTHOPAEDIC SURGERY

## 2024-06-27 PROCEDURE — 5A1D70Z PERFORMANCE OF URINARY FILTRATION, INTERMITTENT, LESS THAN 6 HOURS PER DAY: ICD-10-PCS | Performed by: FAMILY MEDICINE

## 2024-06-27 PROCEDURE — 36415 COLL VENOUS BLD VENIPUNCTURE: CPT

## 2024-06-27 PROCEDURE — APPNB60 APP NON BILLABLE TIME 46-60 MINS: Performed by: NURSE PRACTITIONER

## 2024-06-27 PROCEDURE — 2700000000 HC OXYGEN THERAPY PER DAY

## 2024-06-27 PROCEDURE — 87340 HEPATITIS B SURFACE AG IA: CPT

## 2024-06-27 PROCEDURE — 80048 BASIC METABOLIC PNL TOTAL CA: CPT

## 2024-06-27 PROCEDURE — 94760 N-INVAS EAR/PLS OXIMETRY 1: CPT

## 2024-06-27 PROCEDURE — P9016 RBC LEUKOCYTES REDUCED: HCPCS

## 2024-06-27 PROCEDURE — 85025 COMPLETE CBC W/AUTO DIFF WBC: CPT

## 2024-06-27 PROCEDURE — 2580000003 HC RX 258: Performed by: ORTHOPAEDIC SURGERY

## 2024-06-27 PROCEDURE — 6360000002 HC RX W HCPCS: Performed by: INTERNAL MEDICINE

## 2024-06-27 PROCEDURE — 36430 TRANSFUSION BLD/BLD COMPNT: CPT

## 2024-06-27 PROCEDURE — 90935 HEMODIALYSIS ONE EVALUATION: CPT

## 2024-06-27 RX ORDER — SENNOSIDES A AND B 8.6 MG/1
1 TABLET, FILM COATED ORAL 2 TIMES DAILY
Status: DISCONTINUED | OUTPATIENT
Start: 2024-06-27 | End: 2024-06-28 | Stop reason: HOSPADM

## 2024-06-27 RX ORDER — SENNOSIDES A AND B 8.6 MG/1
1 TABLET, FILM COATED ORAL 2 TIMES DAILY
Qty: 14 TABLET | Refills: 0 | Status: SHIPPED
Start: 2024-06-27 | End: 2024-07-04

## 2024-06-27 RX ORDER — OXYCODONE HYDROCHLORIDE 5 MG/1
5 TABLET ORAL EVERY 4 HOURS PRN
Qty: 30 TABLET | Refills: 0 | Status: SHIPPED | OUTPATIENT
Start: 2024-06-27 | End: 2024-07-02

## 2024-06-27 RX ORDER — POLYETHYLENE GLYCOL 3350 17 G/17G
17 POWDER, FOR SOLUTION ORAL DAILY
Status: DISCONTINUED | OUTPATIENT
Start: 2024-06-27 | End: 2024-06-28 | Stop reason: HOSPADM

## 2024-06-27 RX ORDER — ALBUMIN (HUMAN) 12.5 G/50ML
25 SOLUTION INTRAVENOUS AS NEEDED
Status: DISCONTINUED | OUTPATIENT
Start: 2024-06-27 | End: 2024-06-28 | Stop reason: HOSPADM

## 2024-06-27 RX ADMIN — CARVEDILOL 25 MG: 12.5 TABLET, FILM COATED ORAL at 10:12

## 2024-06-27 RX ADMIN — HUMAN INSULIN 8 UNITS: 100 INJECTION, SUSPENSION SUBCUTANEOUS at 10:13

## 2024-06-27 RX ADMIN — HEPARIN SODIUM 1800 UNITS: 1000 INJECTION INTRAVENOUS; SUBCUTANEOUS at 16:42

## 2024-06-27 RX ADMIN — SENNOSIDES 8.6 MG: 8.6 TABLET, FILM COATED ORAL at 21:40

## 2024-06-27 RX ADMIN — OXYCODONE HYDROCHLORIDE 5 MG: 5 TABLET ORAL at 19:04

## 2024-06-27 RX ADMIN — Medication 400 MG: at 21:42

## 2024-06-27 RX ADMIN — SODIUM CHLORIDE, PRESERVATIVE FREE 10 ML: 5 INJECTION INTRAVENOUS at 12:04

## 2024-06-27 RX ADMIN — POLYETHYLENE GLYCOL 3350 17 G: 17 POWDER, FOR SOLUTION ORAL at 18:14

## 2024-06-27 RX ADMIN — OXYCODONE HYDROCHLORIDE 5 MG: 5 TABLET ORAL at 12:00

## 2024-06-27 RX ADMIN — Medication 400 MG: at 10:11

## 2024-06-27 RX ADMIN — CARVEDILOL 25 MG: 12.5 TABLET, FILM COATED ORAL at 18:14

## 2024-06-27 RX ADMIN — ASPIRIN 81 MG CHEWABLE TABLET 81 MG: 81 TABLET CHEWABLE at 10:13

## 2024-06-27 RX ADMIN — FERROUS SULFATE TAB 325 MG (65 MG ELEMENTAL FE) 325 MG: 325 (65 FE) TAB at 10:13

## 2024-06-27 RX ADMIN — OXYCODONE 10 MG: 5 TABLET ORAL at 06:04

## 2024-06-27 RX ADMIN — SODIUM CHLORIDE, PRESERVATIVE FREE 10 ML: 5 INJECTION INTRAVENOUS at 21:40

## 2024-06-27 RX ADMIN — ISOSORBIDE MONONITRATE 60 MG: 60 TABLET, EXTENDED RELEASE ORAL at 10:12

## 2024-06-27 RX ADMIN — Medication 1 MG: at 12:00

## 2024-06-27 RX ADMIN — PANTOPRAZOLE SODIUM 40 MG: 40 TABLET, DELAYED RELEASE ORAL at 06:04

## 2024-06-27 RX ADMIN — Medication 4.5 MG: at 21:40

## 2024-06-27 RX ADMIN — PREGABALIN 150 MG: 75 CAPSULE ORAL at 21:40

## 2024-06-27 ASSESSMENT — PAIN SCALES - GENERAL
PAINLEVEL_OUTOF10: 10
PAINLEVEL_OUTOF10: 0
PAINLEVEL_OUTOF10: 8

## 2024-06-27 ASSESSMENT — PAIN DESCRIPTION - DESCRIPTORS: DESCRIPTORS: ACHING

## 2024-06-27 ASSESSMENT — PAIN DESCRIPTION - ORIENTATION: ORIENTATION: RIGHT;LEFT

## 2024-06-27 ASSESSMENT — PAIN DESCRIPTION - LOCATION
LOCATION: SACRUM
LOCATION: LEG;HIP

## 2024-06-27 NOTE — PROGRESS NOTES
RENAL  PROGRESS NOTE        Subjective:    resting  Objective:   VITALS SIGNS:    BP (!) 110/56   Pulse 82   Temp 98.7 °F (37.1 °C) (Oral)   Resp 18   Ht 1.753 m (5' 9\")   Wt 108.4 kg (239 lb)   SpO2 99%   BMI 35.29 kg/m²             Temp (24hrs), Av.7 °F (37.1 °C), Min:98.7 °F (37.1 °C), Max:98.7 °F (37.1 °C)         PHYSICAL EXAM:  NAD    DATA REVIEW:     INTAKE / OUTPUT:   Last shift:      No intake/output data recorded.  Last 3 shifts:  190 -  0700  In: 0.1 [I.V.:0.1]  Out: 300 [Urine:300]    Intake/Output Summary (Last 24 hours) at 2024 0900  Last data filed at 2024 2020  Gross per 24 hour   Intake --   Output 300 ml   Net -300 ml         LABS:   LABS:  Recent Labs     24  0801 24  1134 24  1223    140  139  --    K 4.0 4.1  4.1  --    * 109*  108  --    CO2   --    BUN 35* 30*  31*  --    CREATININE 2.57* 2.42*  2.48* 1.79*   CALCIUM 9.3 8.5  8.4*  --    PHOS  --  4.1  --      Recent Labs     24  1134 24  0714   WBC 7.0  --    HGB 7.2* 7.0*   HCT 25.0* 23.2*     --      No results for input(s): \"KU\", \"CLU\" in the last 720 hours.    Invalid input(s): \"ALEJANDRA\", \"CREAU\", \"PROU\"      Assessment:     New ESRD ,HD at her rehab ? TTS  PC  Hypotension  Anemia  S/p ankle surgery        Dc IVF  HD today   OTTO   Karla Meraz MD

## 2024-06-27 NOTE — FLOWSHEET NOTE
PRE HD TREATMENT   06/27/24 1300   Vital Signs   BP (!) 106/56   Temp 97.8 °F (36.6 °C)   Pulse 79   Respirations 18   SpO2 97 %   Pain Assessment   Pain Assessment None - Denies Pain   Response to Pain Intervention Patient satisfied   Treatment   Time On 1305   Time Off 1635   Treatment Goal 1L, 3.5 hours   Observations & Evaluations   Level of Consciousness 0   Oriented X 3   Respiratory Quality/Effort Unlabored   O2 Device Nasal cannula   Bilateral Breath Sounds Clear   Skin Color Pale   Skin Condition/Temp Warm   Abdomen Inspection Soft   Technical Checks   Dialysis Machine No. 02   RO Machine Number [id number]   Dialyzer Lot No. [id number]   Tubing Lot Number [id number]   All Connections Secure Yes   NS Bag Yes   Saline Line Double Clamped Yes   Dialyzer Revaclear 300   Prime Volume (mL) 200 mL   ICEBOAT I;C;E;B;O;A;T   RO Machine Log Sheet Completed Yes   Machine Alarm Self Test Completed;Passed   Air Foam Detector Tested;Proper Function;pH Reading   Extracorporeal Circuit Tested for Integrity Yes   Machine Conductivity 13.8   Bleach Test (Neg) Yes   Bath Temperature 96.8 °F (36 °C)   Dialysis Bath   K+ (Potassium) 2   Ca+ (Calcium) 2.5   Na+ (Sodium) 140   HCO3 (Bicarb) 38   Bicarbonate Concentrate Lot No. [id number]   Acid Concentrate Lot No. [id number]   Treatment Initiation   Dialyze Hours 3.5   Treatment  Initiation Universal Precautions maintained;Lines secured to patient;Connections secured;Prime given;Venous Parameters set;Arterial Parameters set;Air foam detector engaged;[place] Device;Saline line double clamped;Dialyzer;REV-300   Hemodialysis Central Access Right Subclavian   Placement Date/Time: 05/09/24 1100   Present on Admission/Arrival: No  Inserted by: IF  Orientation: Right  Access Location: Subclavian   Continued need for line? Yes   Site Assessment Clean, dry & intact   CVC Lumen Status Brisk blood return;Flushed   Venous Lumen Status Brisk blood return;Flushed   Arterial Lumen Status Brisk blood  return;Flushed   Alcohol Cap Used Yes   Line Care Cap changed;Connections checked and tightened;Chlorhexidine wipes;Ports disinfected   Dressing Type Sterile dressing, transparent   Date of Last Dressing Change 06/26/24   Dressing Status Clean, dry & intact   Dressing Change Due 06/28/24     Primary RN SBAR: Germain Topete RN  Patient Education provided: dialysis treatment order  Incapacitated Nurse kamlesh. provided: yes  Preferred Education method and Primary language: Verbal/English  Hospital associated wait time; reason: none  Hepatitis B Surface Ag   Date/Time Value Ref Range Status   06/27/2024 10:59 AM <0.10 Index Final     Hep B S Ag Interp   Date/Time Value Ref Range Status   06/27/2024 10:59 AM Negative NEG   Final     Hep B S Ab   Date/Time Value Ref Range Status   05/02/2024 05:36 PM <3.10 mIU/mL Final     Hep B S Ab Interp   Date/Time Value Ref Range Status   05/02/2024 05:36 PM NONREACTIVE NR   Final     Comment:     (NOTE)  The ADVIA Centaur Anti-HBs2 assay is traceable to the World Health   Organization (WHO) Hepatitis B Immunoglobulin 1st International   Reference Preparation (1977). Samples with a calculated value of 10   mIU/mL or greater are considered reactive (protective) in accordance   with the CDC guidelines. The accepted criteria for immunity to HBV is   anti-HBs activity greater than or equal to 10 mIU/mL, as defined by   the WHO International Reference Preparation.  Assay performance has not been established in pregnant women,   patients who are immunosuppressed or immunocompromised, nor have   performance characteristics been established in conjunction with   other 's assays for specific HBV serologic markers. This   assay does not differentiate between vaccine induced immune response   and a response due to infection with HBV. Passively acquired anti-HBs   may be identified following patient transfusion, receipt of   immunoglobulin products, etc.         06/27/24 1305   During

## 2024-06-27 NOTE — PROGRESS NOTES
[facility]                                                                                          Hospitalist Progress Note  LIZZY Ramirez - CNP  Answering service: [phone] OR [phone] from in house phone        Date of Service:  2024  NAME:  Marilee Oakes  :  1950  MRN:  [id number]       Admission Summary:   Marilee Oakes is a 73 y.o. female with a past medical history significant for HFpEF, ESRD on HD, hypertension, COPD with chronic respiratory failure on home oxygen at 2 L, A-fib status post Watchman, chronic lower extremity edema, neuropathy, diabetes, anemia of chronic disease who presents with closed displaced bimalleolar fracture of the left lower leg with malunion. Onset of symptoms was in March following a fall and hospital stay at [facility] following which patient was discharged to [facility].  Patient is currently admitted to the orthopedic service and is postop day 1 status post   Left ankle and subtalar joint arthrodesis with retrograde tibiotalar calcaneal nail, fusion reconstruction, ankle arthroscopic debridement.  We are asked to see the patient in consult to assist in managing diabetes and other medical issues.       Interval history / Subjective:     patient arousable, answers yes no to questions, remain in bed, on oxygen 2 lnc     Assessment & Plan:      Left ankle fracture s/p ORIF with nonunion s/p left ankle retrograde IM nail   -multimodal pain relief  -ortho following  -PT/OT    HTN  HFpEF  A-fib s/p watchman  -continue Coreg, isosorbide and asa  -hold bumex at this time    COPD with chronic respiratory failure  -on home oxygen 2lnc, currently at baseline  -albuterol and tessalon prn     ESRD on HD TTS  -dialysis this afternoon  -nephrology following  -trend labs    DM  with neuropathy  -continue home NPH  -continue SSI, check BS per protocol  -continue home lyrica    Acute on chronic anemia, chronic  Screening     Physical abuse: Denies     Verbal abuse: Denies     Emotional abuse: Denies     Financial abuse: Denies     Sexual abuse: Denies   Utilities: Not At Risk (5/12/2024)    [facility] Utilities     Threatened with loss of utilities: No       Review of Systems:   Pertinent items are noted in HPI.       Vital Signs:    Last 24hrs VS reviewed since prior progress note. Most recent are:  Vitals:    06/27/24 1330   BP: (!) 98/49   Pulse: 81   Resp:    Temp:    SpO2:          Intake/Output Summary (Last 24 hours) at 6/27/2024 1338  Last data filed at 6/26/2024 2020  Gross per 24 hour   Intake --   Output 300 ml   Net -300 ml        Physical Examination:     I had a face to face encounter with this patient and independently examined them on 6/27/2024 as outlined below:          General : alert , awake, no acute distress,   HEENT: PEERL, EOMI, moist mucus membrane  Neck: supple, no JVD, no meningeal signs  Chest: Clear to auscultation bilaterally   CVS: S1 S2 heard, Capillary refill less than 2 seconds  Abd: soft/ non tender, non distended, BS physiological,   Ext: no clubbing, no cyanosis, no edema, brisk 2+ DP pulses  Neuro/Psych: pleasant mood and affect, CN 2-12 grossly intact, sensory grossly within normal limit  Skin: warm     Data Review:    Review and/or order of clinical lab test      I have personally and independently reviewed all pertinent labs, diagnostic studies, imaging, and have provided independent interpretation of the same.     Labs:     Recent Labs     06/26/24  1134 06/27/24  0801   WBC 7.0 5.5   HGB 7.2* 6.5*   HCT 25.0* 21.8*    167     Recent Labs     06/26/24  1134 06/27/24  0801     139 141   K 4.1  4.1 4.0   *  108 111*   CO2 25  27 23   BUN 30*  31* 35*   PHOS 4.1  --      Recent Labs     06/26/24  1134   ALT 8*   GLOB 3.0     No results for input(s): \"INR\", \"APTT\" in the last 72 hours.    Invalid input(s): \"PTP\"   No results for input(s): \"TIBC\" in the last 72     albuterol (PROVENTIL) (2.5 MG/3ML) 0.083% nebulizer solution 2.5 mg  2.5 mg Nebulization Q4H PRN    aspirin chewable tablet 81 mg  81 mg Oral Daily    Virt-Caps 1 mg  1 capsule Oral Daily    benzonatate (TESSALON) capsule 100 mg  100 mg Oral TID PRN    carvedilol (COREG) tablet 25 mg  25 mg Oral BID with meals    ferrous sulfate (IRON 325) tablet 325 mg  325 mg Oral Daily with breakfast    isosorbide mononitrate (IMDUR) extended release tablet 60 mg  60 mg Oral Daily    insulin NPH (HumuLIN N;NovoLIN N) injection vial 8 Units  8 Units SubCUTAneous Daily    loperamide (IMODIUM) capsule 2 mg  2 mg Oral 4x Daily PRN    melatonin tablet 4.5 mg  4.5 mg Oral Nightly    naloxone (NARCAN) injection 0.4 mg  0.4 mg IntraVENous PRN    pantoprazole (PROTONIX) tablet 40 mg  40 mg Oral QAM AC    simethicone (MYLICON) 40 MG/0.6ML suspension drops 20 mg  20 mg Oral Q6H PRN    sodium chloride flush 0.9 % injection 5-40 mL  5-40 mL IntraVENous 2 times per day    sodium chloride flush 0.9 % injection 5-40 mL  5-40 mL IntraVENous PRN    0.9 % sodium chloride infusion   IntraVENous PRN    oxyCODONE (ROXICODONE) immediate release tablet 5 mg  5 mg Oral Q4H PRN    bisacodyl (DULCOLAX) EC tablet 5 mg  5 mg Oral Daily PRN    ondansetron (ZOFRAN) injection 4 mg  4 mg IntraVENous Q6H PRN    dextrose bolus 10% 125 mL  125 mL IntraVENous PRN    Or    dextrose bolus 10% 250 mL  250 mL IntraVENous PRN    glucagon injection 1 mg  1 mg SubCUTAneous PRN    dextrose 10 % infusion   IntraVENous Continuous PRN    magnesium oxide (MAG-OX) tablet 400 mg  400 mg Oral BID     ______________________________________________________________________  EXPECTED LENGTH OF STAY: Unable to retrieve estimated LOS  ACTUAL LENGTH OF STAY:          2                 Kodak Guerra, APRN - CNP

## 2024-06-27 NOTE — CONSENT
Informed Consent for Blood Component Transfusion Note    I have discussed with the patient and spouse the rationale for blood component transfusion; its benefits in treating or preventing fatigue, organ damage, or death; and its risk which includes mild transfusion reactions, rare risk of blood borne infection, or more serious but rare reactions. I have discussed the alternatives to transfusion, including the risk and consequences of not receiving transfusion. The patient and spouse had an opportunity to ask questions and had agreed to proceed with transfusion of blood components.    Electronically signed by LIZZY Calix NP on 6/27/24 at 12:35 PM EDT

## 2024-06-27 NOTE — PROGRESS NOTES
Ortho NP Note    POD# 2  s/p LEFT ANKLE AND SUBTALAR JOINT  ARTHRODESIS WITH RETROGRADE TIBIATALOCALCANEAL NAIL, FUSION RECONSTRUCTION, ANKLE ARTHROSCOPIC DEBRIDEMENT   Pt seen with  at bedside.     Pt resting in bed. Much more alert and awake today. Orieted x3.   Currently reports no postop pain, receiving oxycodone 10 mg   Tolerating regular diet. No nausea. No BM since surgery.   No complaints.      VSS Afebrile. 2L NC     Visit Vitals  BP (!) 110/56   Pulse 82   Temp 98.7 °F (37.1 °C) (Oral)   Resp 18   Ht 1.753 m (5' 9\")   Wt 108.4 kg (239 lb)   SpO2 99%   BMI 35.29 kg/m²       Voiding status: voiding/purwick          Labs    Lab Results   Component Value Date/Time    HGB 6.5 06/27/2024 08:01 AM      Lab Results   Component Value Date/Time    INR 1.2 05/14/2024 05:47 PM      Lab Results   Component Value Date/Time     06/27/2024 08:01 AM    K 4.0 06/27/2024 08:01 AM     06/27/2024 08:01 AM    CO2 23 06/27/2024 08:01 AM    BUN 35 06/27/2024 08:01 AM     Recent Glucose Results:   Glucose   Date Value Ref Range Status   06/27/2024 148 (H) 65 - 100 mg/dL Final   06/26/2024 187 (H) 65 - 100 mg/dL Final   06/26/2024 183 (H) 65 - 100 mg/dL Final           Body mass index is 35.29 kg/m². : A BMI > 30 is classified as obesity and > 40 is classified as morbid obesity.     Skin is pale.   Splint dressing c.d.I - elevated on pillows. No significant swelling   Cryotherapy in place over splint  Calves soft and supple; No pain with passive stretch  BLE numbness at baseline, reports hx neuropathy. Sensation present proximal to splint. Able to move toes  SCDs for mechanical DVT proph while in bed     PLAN:  1) PT BID, OT - NWB  2) Pain control - scheduled tylenol, and prn  oxycodone  .  3) Post op care - Continue bowel regimen, encouraged IS. Straight cath per protocol.  Splint dressing  to remain in place until follow up  days unless integrity is lost.  Keep splint clean and dry.     5) Hospitalist & DTC  consult for medical management - hx CHF, Afib, COPD (2L at baseline), DM2, ESRD on HD. Chronic anemia - Hgb 6.5 today from 8.5. Minimal EBL in surgery. Will transfuse 1 unit PRBC during HD treatment today.   6) Discharge planning - Plans to return to [facility] when medically stable.       LIZZY Calix - NP

## 2024-06-27 NOTE — PROGRESS NOTES
Occupational Therapy Contact Note  06/27/24    Patient with acute HgB drop to 6.5 and is currently being set up for HD at bedside. Will defer and follow up per POC.    Thank you,  BRENNEN Ness, OTR/L

## 2024-06-28 VITALS
BODY MASS INDEX: 35.4 KG/M2 | SYSTOLIC BLOOD PRESSURE: 107 MMHG | WEIGHT: 239 LBS | RESPIRATION RATE: 18 BRPM | HEIGHT: 69 IN | HEART RATE: 83 BPM | OXYGEN SATURATION: 93 % | DIASTOLIC BLOOD PRESSURE: 63 MMHG | TEMPERATURE: 98 F

## 2024-06-28 PROBLEM — E11.65 TYPE 2 DIABETES MELLITUS WITH HYPERGLYCEMIA, WITHOUT LONG-TERM CURRENT USE OF INSULIN (HCC): Status: ACTIVE | Noted: 2020-04-30

## 2024-06-28 PROBLEM — Z98.1 S/P ANKLE ARTHRODESIS: Status: ACTIVE | Noted: 2024-06-28

## 2024-06-28 LAB
ABO + RH BLD: NORMAL
ANION GAP SERPL CALC-SCNC: 6 MMOL/L (ref 5–15)
BASOPHILS # BLD: 0 K/UL (ref 0–0.1)
BASOPHILS NFR BLD: 0 % (ref 0–1)
BLD PROD TYP BPU: NORMAL
BLOOD BANK BLOOD PRODUCT EXPIRATION DATE: NORMAL
BLOOD BANK DISPENSE STATUS: NORMAL
BLOOD BANK ISBT PRODUCT BLOOD TYPE: 5100
BLOOD BANK PRODUCT CODE: NORMAL
BLOOD BANK UNIT TYPE AND RH: NORMAL
BLOOD GROUP ANTIBODIES SERPL: NORMAL
BPU ID: NORMAL
BUN SERPL-MCNC: 24 MG/DL (ref 6–20)
BUN/CREAT SERPL: 11 (ref 12–20)
CALCIUM SERPL-MCNC: 9.1 MG/DL (ref 8.5–10.1)
CHLORIDE SERPL-SCNC: 103 MMOL/L (ref 97–108)
CO2 SERPL-SCNC: 30 MMOL/L (ref 21–32)
CREAT SERPL-MCNC: 2.1 MG/DL (ref 0.55–1.02)
CROSSMATCH RESULT: NORMAL
DIFFERENTIAL METHOD BLD: ABNORMAL
EOSINOPHIL # BLD: 0.3 K/UL (ref 0–0.4)
EOSINOPHIL NFR BLD: 5 % (ref 0–7)
ERYTHROCYTE [DISTWIDTH] IN BLOOD BY AUTOMATED COUNT: 17.2 % (ref 11.5–14.5)
GLUCOSE BLD STRIP.AUTO-MCNC: 127 MG/DL (ref 65–117)
GLUCOSE BLD STRIP.AUTO-MCNC: 187 MG/DL (ref 65–117)
GLUCOSE SERPL-MCNC: 138 MG/DL (ref 65–100)
HCT VFR BLD AUTO: 24.7 % (ref 35–47)
HGB BLD-MCNC: 7.7 G/DL (ref 11.5–16)
IMM GRANULOCYTES # BLD AUTO: 0.1 K/UL (ref 0–0.04)
IMM GRANULOCYTES NFR BLD AUTO: 1 % (ref 0–0.5)
LYMPHOCYTES # BLD: 0.5 K/UL (ref 0.8–3.5)
LYMPHOCYTES NFR BLD: 8 % (ref 12–49)
MAGNESIUM SERPL-MCNC: 1.9 MG/DL (ref 1.6–2.4)
MCH RBC QN AUTO: 26.4 PG (ref 26–34)
MCHC RBC AUTO-ENTMCNC: 31.2 G/DL (ref 30–36.5)
MCV RBC AUTO: 84.6 FL (ref 80–99)
MONOCYTES # BLD: 0.5 K/UL (ref 0–1)
MONOCYTES NFR BLD: 8 % (ref 5–13)
NEUTS SEG # BLD: 4.9 K/UL (ref 1.8–8)
NEUTS SEG NFR BLD: 78 % (ref 32–75)
NRBC # BLD: 0 K/UL (ref 0–0.01)
NRBC BLD-RTO: 0 PER 100 WBC
PLATELET # BLD AUTO: 184 K/UL (ref 150–400)
PMV BLD AUTO: 11.6 FL (ref 8.9–12.9)
POTASSIUM SERPL-SCNC: 3.5 MMOL/L (ref 3.5–5.1)
RBC # BLD AUTO: 2.92 M/UL (ref 3.8–5.2)
RBC MORPH BLD: ABNORMAL
SERVICE CMNT-IMP: ABNORMAL
SERVICE CMNT-IMP: ABNORMAL
SODIUM SERPL-SCNC: 139 MMOL/L (ref 136–145)
SPECIMEN EXP DATE BLD: NORMAL
UNIT DIVISION: 0
UNIT ISSUE DATE/TIME: NORMAL
WBC # BLD AUTO: 6.3 K/UL (ref 3.6–11)

## 2024-06-28 PROCEDURE — 85025 COMPLETE CBC W/AUTO DIFF WBC: CPT

## 2024-06-28 PROCEDURE — 83735 ASSAY OF MAGNESIUM: CPT

## 2024-06-28 PROCEDURE — 99231 SBSQ HOSP IP/OBS SF/LOW 25: CPT | Performed by: CLINICAL NURSE SPECIALIST

## 2024-06-28 PROCEDURE — 6370000000 HC RX 637 (ALT 250 FOR IP): Performed by: NURSE PRACTITIONER

## 2024-06-28 PROCEDURE — 6370000000 HC RX 637 (ALT 250 FOR IP): Performed by: ORTHOPAEDIC SURGERY

## 2024-06-28 PROCEDURE — APPNB60 APP NON BILLABLE TIME 46-60 MINS: Performed by: NURSE PRACTITIONER

## 2024-06-28 PROCEDURE — 2700000000 HC OXYGEN THERAPY PER DAY

## 2024-06-28 PROCEDURE — 80048 BASIC METABOLIC PNL TOTAL CA: CPT

## 2024-06-28 PROCEDURE — 94760 N-INVAS EAR/PLS OXIMETRY 1: CPT

## 2024-06-28 PROCEDURE — 2580000003 HC RX 258: Performed by: ORTHOPAEDIC SURGERY

## 2024-06-28 PROCEDURE — 82962 GLUCOSE BLOOD TEST: CPT

## 2024-06-28 PROCEDURE — 36415 COLL VENOUS BLD VENIPUNCTURE: CPT

## 2024-06-28 RX ADMIN — ASPIRIN 81 MG CHEWABLE TABLET 81 MG: 81 TABLET CHEWABLE at 09:11

## 2024-06-28 RX ADMIN — PANTOPRAZOLE SODIUM 40 MG: 40 TABLET, DELAYED RELEASE ORAL at 07:07

## 2024-06-28 RX ADMIN — SODIUM CHLORIDE, PRESERVATIVE FREE 10 ML: 5 INJECTION INTRAVENOUS at 09:13

## 2024-06-28 RX ADMIN — SENNOSIDES 8.6 MG: 8.6 TABLET, FILM COATED ORAL at 09:12

## 2024-06-28 RX ADMIN — ISOSORBIDE MONONITRATE 60 MG: 60 TABLET, EXTENDED RELEASE ORAL at 09:06

## 2024-06-28 RX ADMIN — OXYCODONE HYDROCHLORIDE 5 MG: 5 TABLET ORAL at 05:28

## 2024-06-28 RX ADMIN — CARVEDILOL 25 MG: 12.5 TABLET, FILM COATED ORAL at 09:09

## 2024-06-28 RX ADMIN — Medication 1 MG: at 09:12

## 2024-06-28 RX ADMIN — HUMAN INSULIN 8 UNITS: 100 INJECTION, SUSPENSION SUBCUTANEOUS at 09:13

## 2024-06-28 RX ADMIN — Medication 400 MG: at 09:06

## 2024-06-28 RX ADMIN — POLYETHYLENE GLYCOL 3350 17 G: 17 POWDER, FOR SOLUTION ORAL at 09:04

## 2024-06-28 RX ADMIN — FERROUS SULFATE TAB 325 MG (65 MG ELEMENTAL FE) 325 MG: 325 (65 FE) TAB at 09:10

## 2024-06-28 ASSESSMENT — PAIN SCALES - GENERAL: PAINLEVEL_OUTOF10: 7

## 2024-06-28 ASSESSMENT — PAIN DESCRIPTION - ORIENTATION: ORIENTATION: LEFT

## 2024-06-28 ASSESSMENT — PAIN DESCRIPTION - DESCRIPTORS: DESCRIPTORS: ACHING

## 2024-06-28 ASSESSMENT — PAIN DESCRIPTION - LOCATION: LOCATION: SHOULDER

## 2024-06-28 NOTE — PROGRESS NOTES
Ortho NP Note    POD# 3  s/p LEFT ANKLE AND SUBTALAR JOINT  ARTHRODESIS WITH RETROGRADE TIBIATALOCALCANEAL NAIL, FUSION RECONSTRUCTION, ANKLE ARTHROSCOPIC DEBRIDEMENT   Pt seen with  at bedside.     Pt resting in bed, eating breakfast. Awake and alert. Orieted x3. Smiling.    Currently reports no postop pain, receiving oxycodone.   Numbness in lisa feet at baseline, hx neuropathy.  Tolerating regular diet. No nausea. No BM since surgery. Bowel regimen started yesterday.   No complaints.      VSS Afebrile. Room air.     Visit Vitals  /63   Pulse 83   Temp 98 °F (36.7 °C) (Oral)   Resp 18   Ht 1.753 m (5' 9\")   Wt 108.4 kg (239 lb)   SpO2 93%   BMI 35.29 kg/m²       Voiding status: voiding/purwick          Labs    Lab Results   Component Value Date/Time    HGB 7.7 06/28/2024 07:21 AM      Lab Results   Component Value Date/Time    INR 1.2 05/14/2024 05:47 PM      Lab Results   Component Value Date/Time     06/28/2024 07:21 AM    K 3.5 06/28/2024 07:21 AM     06/28/2024 07:21 AM    CO2 30 06/28/2024 07:21 AM    BUN 24 06/28/2024 07:21 AM     Recent Glucose Results:   Glucose   Date Value Ref Range Status   06/28/2024 138 (H) 65 - 100 mg/dL Final   06/27/2024 148 (H) 65 - 100 mg/dL Final   06/26/2024 187 (H) 65 - 100 mg/dL Final   06/26/2024 183 (H) 65 - 100 mg/dL Final           Body mass index is 35.29 kg/m². : A BMI > 30 is classified as obesity and > 40 is classified as morbid obesity.     Splint dressing c.d.I - elevated on pillows. No significant swelling   Cryotherapy in place over splint  Calves soft and supple; No pain with passive stretch  BLE numbness at baseline, reports hx neuropathy. Sensation present proximal to splint. Able to move toes. Unable to palpate pulse due to splint.   SCDs for mechanical DVT proph while in bed     PLAN:  1) PT BID, OT - NWB  2) Pain control - scheduled tylenol, and prn oxycodone  .  3) Post op care - Continue bowel regimen, encouraged IS. Splint dressing   to remain in place until follow up unless integrity is lost.  Keep splint clean and dry.     5) Hospitalist, Nephrology, Endocrine & DTC following for medical management - hx CHF, Afib, COPD (2L at baseline), DM2, ESRD on HD. Chronic anemia - Received 1 unit PRBC yesterday for Hgb 6.5 today. Hgb 7.7 today, at baseline, VSS.   6) Discharge planning - Plans to return to [facility] today pending transport.       LIZZY Calix - NP

## 2024-06-28 NOTE — DISCHARGE SUMMARY
Discharge Summary       PATIENT ID: Marilee Oakes  MRN: [id number]   YOB: 1950    DATE OF ADMISSION: 6/25/2024 10:41 AM    DATE OF DISCHARGE: 6/28/2024   PRIMARY CARE PROVIDER: Mariela Marino MD     ATTENDING PHYSICIAN: Nika Pham MD  DISCHARGING PROVIDER: David M Milligram, PA-C    To contact this individual call [phone] and ask the  to page.  If unavailable ask to be transferred the Adult Hospitalist Department.    CONSULTATIONS: IP CONSULT TO HOSPITALIST  IP CONSULT TO CASE MANAGEMENT  IP CONSULT TO HOSPITALIST  IP CONSULT TO CASE MANAGEMENT  IP CONSULT TO DIABETES MANAGEMENT  IP CONSULT TO NEPHROLOGY    PROCEDURES/SURGERIES: Procedure(s):  LEFT ANKLE AND SUBTALAR JOINT  ARTHRODESIS WITH RETROGRADE TIBIATALOCALCANEAL NAIL, FUSION RECONSTRUCTION, ANKLE ARTHROSCOPIC DEBRIDEMENT     ADMITTING DIAGNOSES & HOSPITAL COURSE:   Marilee Oakes is a 73 y.o. female with a past medical history significant for HFpEF, ESRD on HD, hypertension, COPD with chronic respiratory failure on home oxygen at 2 L, A-fib status post Watchman, chronic lower extremity edema, neuropathy, diabetes, anemia of chronic disease who presents with closed displaced bimalleolar fracture of the left lower leg with malunion. Onset of symptoms was in March following a fall and hospital stay at [facility] following which patient was discharged to [place] rehab.  Patient is currently admitted to the orthopedic service and is postop day 1 status post   Left ankle and subtalar joint arthrodesis with retrograde tibiotalar calcaneal nail, fusion reconstruction, ankle arthroscopic debridement.  We are asked to see the patient in consult to assist in managing diabetes and other medical issues.    DISCHARGE DIAGNOSES / PLAN:      Left ankle fracture s/p ORIF with nonunion s/p left ankle retrograde IM nail 6/26  - PRN Norco at discharge  - Follow up with orthopedic surgeon  - PT/OT     HTN  HFpEF  A-fib s/p watchman  -  to get these medications is not yet available    Ask your nurse or doctor about these medications  oxyCODONE 5 MG immediate release tablet  senna 8.6 MG tablet           NOTIFY YOUR PHYSICIAN FOR ANY OF THE FOLLOWING:   Fever over 101 degrees for 24 hours.   Chest pain, shortness of breath, fever, chills, nausea, vomiting, diarrhea, change in mentation, falling, weakness, bleeding. Severe pain or pain not relieved by medications.  Or, any other signs or symptoms that you may have questions about.    DISPOSITION:    Home With:   OT  PT  HH  RN      x Long term SNF/Inpatient Rehab    Independent/assisted living    Hospice    Other:       PATIENT CONDITION AT DISCHARGE:     Functional status    Poor    x Deconditioned     Independent      Cognition    x Lucid     Forgetful     Dementia      Catheters/lines (plus indication)    Lane     PICC     PEG    x None      Code status    x Full code     DNR      PHYSICAL EXAMINATION AT DISCHARGE:    General : alert x 3, awake, no acute distress,   HEENT: PEERL, EOMI, moist mucus membrane  Neck: supple, no JVD, no meningeal signs  Chest: Clear to auscultation bilaterally   CVS: S1 S2 heard, Capillary refill less than 2 seconds  Abd: soft/ Non tender, non distended, BS physiological,   Ext: no clubbing, no cyanosis, no edema, brisk 2+ DP pulses, L ankle splint in place  Neuro/Psych: pleasant mood and affect, CN 2-12 grossly intact, sensory grossly within normal limit, moves all extremities   Skin: warm     CHRONIC MEDICAL DIAGNOSES:  Principal Problem:    Valgus foot deformity, acquired, left  Active Problems:    Type 2 diabetes mellitus with hyperglycemia, without long-term current use of insulin (HCC)    S/P ankle arthrodesis  Resolved Problems:    * No resolved hospital problems. *        Greater than 31 minutes were spent with the patient on counseling and coordination of care    Signed:   David M Milligram, PA-C  6/28/2024  10:09 AM

## 2024-06-28 NOTE — PROGRESS NOTES
TRANSFER - OUT REPORT:    Marilee Oakes  being transferred to [facility] for Continuation of care.     No handoff report was given. Called twice to give report to the receiving nurse. No answer on the first attempt. Second attempt was answered but I remained on hold for over 15 minutes.            Lines:   Hemodialysis Central Access Right Subclavian (Active)   Continued need for line? Yes 06/28/24 0917   Site Assessment Clean, dry & intact 06/28/24 0917   CVC Lumen Status Alcohol cap present 06/28/24 0917   Venous Lumen Status Alcohol cap present 06/28/24 0917   Arterial Lumen Status Alcohol cap present 06/28/24 0917   Alcohol Cap Used Yes 06/28/24 0917   Line Care Cap changed;Connections checked and tightened;Chlorhexidine wipes;Ports disinfected 06/28/24 0917   Dressing Type Sterile dressing, transparent 06/28/24 0917   Date of Last Dressing Change 06/26/24 06/28/24 0917   Dressing Status Clean, dry & intact 06/28/24 0917   Dressing Change Due 06/28/24 06/28/24 0917        Opportunity for questions and clarification was provided.      Patient transported with:  O2 @ 2lpm

## 2024-06-28 NOTE — PLAN OF CARE
Problem: Pain  Goal: Verbalizes/displays adequate comfort level or baseline comfort level  Outcome: Adequate for Discharge     Problem: Safety - Adult  Goal: Free from fall injury  Outcome: Adequate for Discharge     Problem: Discharge Planning  Goal: Discharge to home or other facility with appropriate resources  Outcome: Adequate for Discharge  Flowsheets (Taken 6/28/2024 0917)  Discharge to home or other facility with appropriate resources: Identify discharge learning needs (meds, wound care, etc)     Problem: Skin/Tissue Integrity  Goal: Absence of new skin breakdown  Description: 1.  Monitor for areas of redness and/or skin breakdown  2.  Assess vascular access sites hourly  3.  Every 4-6 hours minimum:  Change oxygen saturation probe site  4.  Every 4-6 hours:  If on nasal continuous positive airway pressure, respiratory therapy assess nares and determine need for appliance change or resting period.  Outcome: Adequate for Discharge     Problem: Chronic Conditions and Co-morbidities  Goal: Patient's chronic conditions and co-morbidity symptoms are monitored and maintained or improved  Outcome: Adequate for Discharge  Flowsheets (Taken 6/28/2024 0917)  Care Plan - Patient's Chronic Conditions and Co-Morbidity Symptoms are Monitored and Maintained or Improved: Monitor and assess patient's chronic conditions and comorbid symptoms for stability, deterioration, or improvement

## 2024-06-28 NOTE — DIABETES MGMT
stenosis     Metabolic encephalopathy     Morbid (severe) obesity due to excess calories (HCC)     Muscle weakness (generalized)     Neuropathy     Obesity (BMI 30-39.9) 04/30/2019    Obstructive sleep apnea     uses CPAP    Permanent atrial fibrillation (HCC)     Presence of implantable cardioverter-defibrillator (ICD)     Presence of other cardiac implants and grafts     PACEMAKER    Presence of Watchman left atrial appendage closure device     Primary generalized (osteo)arthritis     Spinal stenosis, lumbar region, without neurogenic claudication     Thrombocytopenia (HCC)     Type 2 diabetes mellitus with diabetic neuropathy, without long-term current use of insulin (HCC) 06/05/2016    Venous insufficiency     Vitamin B12 deficiency         INITIAL DX: Closed displaced bimalleolar fracture of left lower leg with malunion [S82.842P]  Valgus foot, left [M21.072]  Left ankle pain, unspecified chronicity [M25.572]  Closed bimalleolar fracture of left ankle, initial encounter [S82.842A]  Inadequately controlled diabetes mellitus (HCC) [E11.65]  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) [E11.44]  Valgus foot deformity, acquired, left [M21.072]     Current Treatment     TX: orthopedic post op care    Hospital Course   Clinical progress has been uncomplicated  6/25: OR with orthopaedics   6/27: PRBC transfusion  Diabetes History   Type Diabetes-Type 2  Ambulatory BG management provided by:   Family History:    Lab Results   Component Value Date    LABA1C 5.5 06/26/2024    LABA1C 5.8 (H) 04/06/2024    LABA1C 5.2 10/27/2023     Diabetes-related Medical History  Neurological complications  Peripheral neuropathy  Microvascular disease  Bodmawmysqo-WRTD-RU dependant (T/TH/S)  Macrovascular disease  CAD  Other associated conditions     HFpEF/HTN/ COPD-02 dependant/ afib with watchman/ anemia of chronic disease    Diabetes Medication History  Diabetes drug class Diabetes drug name Additional Comments    Insulin NPH 8 units daily      Overall evaluation:    [x] Achieving A1c target with drug therapy & self-care practices    Subjective   \"I don't take insulin at home anymore.\"   Objective   Physical exam  General Overweight  female  Neuro  Awake, alert, oriented   Vital Signs   Vitals:    24 0842   BP: 107/63   Pulse: 83   Resp: 18   Temp: 98 °F (36.7 °C)   SpO2: 93%       Laboratory  Recent Labs     24  1134 24  0801 24  0721   WBC 7.0 5.5 6.3   HGB 7.2* 6.5* 7.7*   HCT 25.0* 21.8* 24.7*   MCV 87.1 86.5 84.6    167 184       Recent Labs     24  1134 24  0801 24  0721     139 141 139   K 4.1  4.1 4.0 3.5   *  108 111* 103   CO2 25  27 23 30   PHOS 4.1  --   --    BUN 30*  31* 35* 24*   CREATININE 2.42*  2.48* 2.57* 2.10*       Lab Results   Component Value Date    ALT 8 (L) 2024    AST 13 (L) 2024    ALKPHOS 110 2024    BILITOT 0.3 2024     Lab Results   Component Value Date    TSH 3.28 2022    [id number] 0.34 (L) 2024         Factors impacting BG management  Factor Dose Comments   Nutrition:  Standard meals   60 grams/meal    Pain PRN    Infection NA    Kidney function ESRD-HD dependant    Liver function WNL      Blood glucose pattern      Significant diabetes-related events over the past 24-72 hours  A1C5.5%, question accuracy  Fasting B  Pre-prandial: 165-194  Basal: NPH 8 untis QHS  Bolus: 0  Correction: 0  Total daily insulin dose in the last 24 hours: 8    Assessment and Nursing Intervention   Nursing Diagnosis Risk for unstable blood glucose pattern   Nursing Intervention Domain 4497 Decision-making Support   Nursing Interventions Examined current inpatient diabetes/blood glucose control   Explored factors facilitating and impeding inpatient management  Explored corrective strategies with patient and responsible inpatient provider   Informed patient of rational for insulin strategy while hospitalized      Nursing Diagnosis 45653 Ineffective Health Management   Nursing Intervention Domain 7161 Decision-making Support   Nursing Interventions Identified diabetes self-management practices impeding diabetes control  Discussed diabetes survival skills related to  Healthy Plate eating plan; given handouts  Role of physical activity in improving insulin sensitivity and action  Procedure for blood glucose monitoring & options for low-cost products  Medications plan at discharge     Billing Code(s)   26564    Before making these care recommendations, I personally reviewed the hospitalization record, including notes, laboratory & diagnostic data and current medications, and examined the patient at the bedside.  Total minutes: 25    LIZZY Law   Diabetes Clinical Nurse Specialist and Board Certified Advanced Diabetes Manager  [facility]  Access via [employer]

## 2024-06-28 NOTE — CARE COORDINATION
ANGELIQUE- D/c to [facility] today  Transport- has arranged a wheelchair van to pick pt up at 12:30pm  Nursing-Call report [phone]    CM noted that this pt is being discharged back to [facility] today. CM sent a referral to [facility] via Epic today. CM messaged Tiffani ([phone]) from [facility] and she can accept this pt back there today after 12 noon. CM met with this pt and her  to discuss. Pt stated that she is ready to return to [facility]. The  has arranged for a wheelchair van to pick this pt up today at 12:30pm. An envelope containing pt's AVS and MAR will be sent with pt to [facility]. Also, pt's nurse will call report.    Patient verbalized understanding and gave permission for possible discharge within 4 hours of receiving IMM     CARLEE Marte,ACM-SW

## 2024-06-28 NOTE — PROGRESS NOTES
RENAL  PROGRESS NOTE        Subjective:    resting  Objective:   VITALS SIGNS:    /63   Pulse 83   Temp 98 °F (36.7 °C) (Oral)   Resp 18   Ht 1.753 m (5' 9\")   Wt 108.4 kg (239 lb)   SpO2 93%   BMI 35.29 kg/m²             Temp (24hrs), Av.9 °F (37.2 °C), Min:97.5 °F (36.4 °C), Max:100.6 °F (38.1 °C)         PHYSICAL EXAM:  NAD    DATA REVIEW:     INTAKE / OUTPUT:   Last shift:      No intake/output data recorded.  Last 3 shifts:  190 -  0700  In: 1110   Out:  [Urine:300]    Intake/Output Summary (Last 24 hours) at 2024 0852  Last data filed at 2024 1640  Gross per 24 hour   Intake 1110 ml   Output 1723 ml   Net -613 ml           LABS:   LABS:  Recent Labs     24  0721 24  0801 24  1134    141 140  139   K 3.5 4.0 4.1  4.1    111* 109*  108   CO2 30 23 25  27   BUN 24* 35* 30*  31*   CREATININE 2.10* 2.57* 2.42*  2.48*   CALCIUM 9.1 9.3 8.5  8.4*   PHOS  --   --  4.1   MG 1.9  --   --        Recent Labs     24  0801 24  1134 24  0714   WBC 5.5 7.0  --    HGB 6.5* 7.2* 7.0*   HCT 21.8* 25.0* 23.2*    219  --        No results for input(s): \"KU\", \"CLU\" in the last 720 hours.    Invalid input(s): \"ALEJANDRA\", \"CREAU\", \"PROU\"      Assessment:     New ESRD ,HD at her rehab  TTS  PC  Hypotension  Anemia  S/p ankle surgery           HD  in AM  Need PRBC unless given yesterday   OTTO  Will follow back on Monday;please call us if needed over the weekend   Karla Meraz MD

## 2024-07-08 ENCOUNTER — TELEPHONE (OUTPATIENT)
Age: 74
End: 2024-07-08

## 2024-07-08 ENCOUNTER — OFFICE VISIT (OUTPATIENT)
Facility: CLINIC | Age: 74
End: 2024-07-08
Payer: MEDICARE

## 2024-07-08 DIAGNOSIS — Z99.2 ANEMIA DUE TO CHRONIC KIDNEY DISEASE, ON CHRONIC DIALYSIS (HCC): ICD-10-CM

## 2024-07-08 DIAGNOSIS — I10 ESSENTIAL HYPERTENSION: Chronic | ICD-10-CM

## 2024-07-08 DIAGNOSIS — L30.4 INTERTRIGINOUS DERMATITIS ASSOCIATED WITH MOISTURE: ICD-10-CM

## 2024-07-08 DIAGNOSIS — I25.118 CORONARY ARTERY DISEASE OF NATIVE HEART WITH STABLE ANGINA PECTORIS, UNSPECIFIED VESSEL OR LESION TYPE (HCC): ICD-10-CM

## 2024-07-08 DIAGNOSIS — D63.1 ANEMIA DUE TO CHRONIC KIDNEY DISEASE, ON CHRONIC DIALYSIS (HCC): ICD-10-CM

## 2024-07-08 DIAGNOSIS — N18.6 ESRD (END STAGE RENAL DISEASE) ON DIALYSIS (HCC): Chronic | ICD-10-CM

## 2024-07-08 DIAGNOSIS — N18.6 ANEMIA DUE TO CHRONIC KIDNEY DISEASE, ON CHRONIC DIALYSIS (HCC): ICD-10-CM

## 2024-07-08 DIAGNOSIS — Z99.2 ESRD (END STAGE RENAL DISEASE) ON DIALYSIS (HCC): Chronic | ICD-10-CM

## 2024-07-08 DIAGNOSIS — R52 PAIN AGGRAVATED BY WALKING: Primary | ICD-10-CM

## 2024-07-08 DIAGNOSIS — I50.9 ACUTE CONGESTIVE HEART FAILURE, UNSPECIFIED HEART FAILURE TYPE (HCC): ICD-10-CM

## 2024-07-08 DIAGNOSIS — J96.01 ACUTE RESPIRATORY FAILURE WITH HYPOXIA (HCC): ICD-10-CM

## 2024-07-08 DIAGNOSIS — I48.21 PERMANENT ATRIAL FIBRILLATION (HCC): Primary | ICD-10-CM

## 2024-07-08 DIAGNOSIS — E11.42 DIABETIC POLYNEUROPATHY ASSOCIATED WITH TYPE 2 DIABETES MELLITUS (HCC): ICD-10-CM

## 2024-07-08 DIAGNOSIS — K58.9 IRRITABLE BOWEL SYNDROME, UNSPECIFIED TYPE: ICD-10-CM

## 2024-07-08 PROCEDURE — 1123F ACP DISCUSS/DSCN MKR DOCD: CPT | Performed by: NURSE PRACTITIONER

## 2024-07-08 PROCEDURE — 99310 SBSQ NF CARE HIGH MDM 45: CPT | Performed by: NURSE PRACTITIONER

## 2024-07-08 RX ORDER — CARVEDILOL 25 MG/1
25 TABLET ORAL 2 TIMES DAILY WITH MEALS
Qty: 60 TABLET | Refills: 0 | Status: SHIPPED | OUTPATIENT
Start: 2024-07-08

## 2024-07-08 RX ORDER — AMLODIPINE BESYLATE 5 MG/1
5 TABLET ORAL DAILY
Qty: 30 TABLET | Refills: 0 | Status: SHIPPED | OUTPATIENT
Start: 2024-07-08

## 2024-07-08 RX ORDER — POLYETHYLENE GLYCOL 3350 17 G/17G
17 POWDER, FOR SOLUTION ORAL DAILY
Qty: 765 G | Refills: 0 | Status: SHIPPED | OUTPATIENT
Start: 2024-07-08 | End: 2024-08-22

## 2024-07-08 RX ORDER — PREGABALIN 150 MG/1
150 CAPSULE ORAL DAILY
Qty: 30 CAPSULE | Refills: 0 | Status: SHIPPED | OUTPATIENT
Start: 2024-07-08 | End: 2024-08-07

## 2024-07-08 RX ORDER — MAGNESIUM OXIDE 400 MG/1
1 TABLET ORAL 2 TIMES DAILY
Qty: 30 TABLET | Refills: 0 | Status: SHIPPED | OUTPATIENT
Start: 2024-07-08

## 2024-07-08 RX ORDER — ISOSORBIDE MONONITRATE 60 MG/1
60 TABLET, EXTENDED RELEASE ORAL DAILY
Qty: 30 TABLET | Refills: 0 | Status: SHIPPED | OUTPATIENT
Start: 2024-07-08 | End: 2024-08-07

## 2024-07-08 RX ORDER — LOPERAMIDE HYDROCHLORIDE 2 MG/1
2 CAPSULE ORAL 4 TIMES DAILY PRN
Qty: 30 CAPSULE | Refills: 0 | Status: SHIPPED | OUTPATIENT
Start: 2024-07-08

## 2024-07-08 RX ORDER — CLOTRIMAZOLE AND BETAMETHASONE DIPROPIONATE 10; .64 MG/G; MG/G
CREAM TOPICAL 2 TIMES DAILY
Qty: 45 G | Refills: 2 | Status: SHIPPED | OUTPATIENT
Start: 2024-07-08 | End: 2024-08-07

## 2024-07-08 RX ORDER — TRAMADOL HYDROCHLORIDE 50 MG/1
50 TABLET ORAL EVERY 8 HOURS PRN
Qty: 20 TABLET | Refills: 0 | Status: SHIPPED | OUTPATIENT
Start: 2024-07-08 | End: 2024-07-15

## 2024-07-08 RX ORDER — FOLIC ACID/VIT B COMPLEX AND C 0.8 MG
1 TABLET ORAL DAILY
Qty: 30 TABLET | Refills: 0 | Status: SHIPPED | OUTPATIENT
Start: 2024-07-08 | End: 2024-08-07

## 2024-07-08 RX ORDER — ASPIRIN 81 MG/1
81 TABLET, CHEWABLE ORAL DAILY
Qty: 30 TABLET | Refills: 0 | Status: SHIPPED | OUTPATIENT
Start: 2024-07-08

## 2024-07-08 RX ORDER — BENZONATATE 100 MG/1
100 CAPSULE ORAL 3 TIMES DAILY PRN
Qty: 30 CAPSULE | Refills: 0 | Status: SHIPPED | OUTPATIENT
Start: 2024-07-08

## 2024-07-08 RX ORDER — BUMETANIDE 1 MG/1
2 TABLET ORAL DAILY
Qty: 30 TABLET | Refills: 0 | Status: SHIPPED | OUTPATIENT
Start: 2024-07-08

## 2024-07-08 RX ORDER — CASTOR OIL AND BALSAM, PERU 788; 87 MG/G; MG/G
87 OINTMENT TOPICAL 2 TIMES DAILY
Qty: 28.35 G | Refills: 0 | Status: SHIPPED | OUTPATIENT
Start: 2024-07-08

## 2024-07-08 RX ORDER — ECHINACEA PURPUREA EXTRACT 125 MG
2 TABLET ORAL EVERY 8 HOURS PRN
Qty: 1 EACH | Refills: 0 | Status: SHIPPED | OUTPATIENT
Start: 2024-07-08

## 2024-07-08 RX ORDER — ACETAMINOPHEN 500 MG
500 TABLET ORAL EVERY 6 HOURS PRN
Qty: 120 TABLET | Refills: 0 | Status: SHIPPED | OUTPATIENT
Start: 2024-07-08

## 2024-07-08 RX ORDER — ALBUTEROL SULFATE 90 UG/1
1 AEROSOL, METERED RESPIRATORY (INHALATION) EVERY 4 HOURS PRN
Qty: 18 G | Refills: 0 | Status: SHIPPED | OUTPATIENT
Start: 2024-07-08 | End: 2024-08-07

## 2024-07-08 RX ORDER — TRIAMCINOLONE ACETONIDE 1 MG/G
CREAM TOPICAL
Qty: 30 G | Refills: 0 | Status: SHIPPED | OUTPATIENT
Start: 2024-07-08

## 2024-07-08 RX ORDER — FERROUS SULFATE 325(65) MG
325 TABLET ORAL
Qty: 90 TABLET | Refills: 0 | Status: SHIPPED | OUTPATIENT
Start: 2024-07-08

## 2024-07-08 RX ORDER — OMEPRAZOLE 40 MG/1
40 CAPSULE, DELAYED RELEASE ORAL DAILY
Qty: 30 CAPSULE | Refills: 0 | Status: SHIPPED | OUTPATIENT
Start: 2024-07-08 | End: 2024-08-07

## 2024-07-08 RX ORDER — FLUTICASONE PROPIONATE AND SALMETEROL 250; 50 UG/1; UG/1
1 POWDER RESPIRATORY (INHALATION) EVERY 12 HOURS
Qty: 60 EACH | Refills: 0 | Status: SHIPPED | OUTPATIENT
Start: 2024-07-08 | End: 2024-08-07

## 2024-07-08 RX ORDER — NYSTATIN 100000 U/G
CREAM TOPICAL
Qty: 30 G | Refills: 0 | Status: SHIPPED | OUTPATIENT
Start: 2024-07-08

## 2024-07-08 NOTE — PROGRESS NOTES
PLACE OF SERVICE:  [facility] [address] [place] [address]    SKILLED VISIT        Chief Complaint: ESRD on dialysis, Left ankle  tibiatal ocalcaneal nail, fusion, reconstruction, ankle arthroscopic debridement.    HPI : Marilee Oakes is a 73 y.o. female patient seen today for for follow-up. Patient is alert and oriented able to make her needs known. She denies signs and respiratory distress, lungs clear upon assessment. She  continues on 2 L of oxygen. For history of A-fib s/p watchman, she continues on Coreg twice a day and aspirin 81 mg.  Patient status post patient continues on  dialysis 3 days a week for history of ESRD, via right chest port access. She continues on amlodipine and Isosorbide Mononitrate, blood pressure and heart rate Left ankle and subtalar joint arthrodesis with retrograde tibiatal ocalcaneal nail, fusion, reconstruction, ankle arthroscopic debridement.  Dressing clean dry and intact. Patient is complaining of increased pain she continues on Tylenol without relief.  New order placed for tramadol 50 mg every 12 hours as needed. Patient continues on amlodipine and Isosorbide Mononitrate, blood pressure and heart rate stable.  She remains stable on Bumex 2 mg, edema with noted improvement  to lower extremities. Patient continues on iron supplement daily, patient does have anemia at baseline with hemoglobin between 8 and 9. Will continue to monitor H&H periodically. Patient continues to work with OT and PT she remains motivated and continues to participates in sessions. No further concerns reported. She remains hemodynamically stable at time of visit. Will continue to monitor closely.  Patient is scheduled for discharge home on Wednesday.  Discharge orders completed and medication sent to pharmacy electronically.    PMH:   CHF ESRD A-fib with watchman syndrome GERD hypertension,COPD Chronic respiratory failure      Social History / Family History:

## 2024-07-08 NOTE — TELEPHONE ENCOUNTER
[facility] was admitted to their facility on 5-18-24 and is being discharged  7-9-24    Patient wants to schedule a follow up appointment to see you    Please call  with appointment information     Wesley Oakes    [phone]      Kim's phone  [phone]

## 2024-07-15 ENCOUNTER — HOSPITAL ENCOUNTER (EMERGENCY)
Facility: HOSPITAL | Age: 74
Discharge: HOME | End: 2024-07-15
Attending: STUDENT IN AN ORGANIZED HEALTH CARE EDUCATION/TRAINING PROGRAM
Payer: MEDICARE

## 2024-07-15 VITALS
TEMPERATURE: 97.9 F | SYSTOLIC BLOOD PRESSURE: 108 MMHG | OXYGEN SATURATION: 99 % | DIASTOLIC BLOOD PRESSURE: 59 MMHG | RESPIRATION RATE: 21 BRPM | WEIGHT: 240 LBS | HEART RATE: 81 BPM | BODY MASS INDEX: 35.55 KG/M2 | HEIGHT: 69 IN

## 2024-07-15 DIAGNOSIS — I95.9 TRANSIENT HYPOTENSION: Primary | ICD-10-CM

## 2024-07-15 LAB
ALBUMIN SERPL-MCNC: 2.5 G/DL (ref 3.5–5)
ALBUMIN/GLOB SERPL: 0.6 (ref 1.1–2.2)
ALP SERPL-CCNC: 175 U/L (ref 45–117)
ALT SERPL-CCNC: 11 U/L (ref 12–78)
ANION GAP SERPL CALC-SCNC: 5 MMOL/L (ref 5–15)
AST SERPL-CCNC: 15 U/L (ref 15–37)
BASOPHILS # BLD: 0 K/UL (ref 0–0.1)
BASOPHILS NFR BLD: 0 % (ref 0–1)
BILIRUB SERPL-MCNC: 0.4 MG/DL (ref 0.2–1)
BUN SERPL-MCNC: 16 MG/DL (ref 6–20)
BUN/CREAT SERPL: 10 (ref 12–20)
CALCIUM SERPL-MCNC: 8.8 MG/DL (ref 8.5–10.1)
CHLORIDE SERPL-SCNC: 102 MMOL/L (ref 97–108)
CO2 SERPL-SCNC: 31 MMOL/L (ref 21–32)
COMMENT:: NORMAL
CREAT SERPL-MCNC: 1.54 MG/DL (ref 0.55–1.02)
DIFFERENTIAL METHOD BLD: ABNORMAL
EOSINOPHIL # BLD: 0.1 K/UL (ref 0–0.4)
EOSINOPHIL NFR BLD: 2 % (ref 0–7)
ERYTHROCYTE [DISTWIDTH] IN BLOOD BY AUTOMATED COUNT: 16.1 % (ref 11.5–14.5)
GLOBULIN SER CALC-MCNC: 4.1 G/DL (ref 2–4)
GLUCOSE SERPL-MCNC: 167 MG/DL (ref 65–100)
HCT VFR BLD AUTO: 31.6 % (ref 35–47)
HGB BLD-MCNC: 8.9 G/DL (ref 11.5–16)
IMM GRANULOCYTES # BLD AUTO: 0.1 K/UL (ref 0–0.04)
IMM GRANULOCYTES NFR BLD AUTO: 1 % (ref 0–0.5)
LYMPHOCYTES # BLD: 0.7 K/UL (ref 0.8–3.5)
LYMPHOCYTES NFR BLD: 11 % (ref 12–49)
MCH RBC QN AUTO: 24.5 PG (ref 26–34)
MCHC RBC AUTO-ENTMCNC: 28.2 G/DL (ref 30–36.5)
MCV RBC AUTO: 86.8 FL (ref 80–99)
MONOCYTES # BLD: 0.5 K/UL (ref 0–1)
MONOCYTES NFR BLD: 8 % (ref 5–13)
NEUTS SEG # BLD: 5.1 K/UL (ref 1.8–8)
NEUTS SEG NFR BLD: 78 % (ref 32–75)
NRBC # BLD: 0 K/UL (ref 0–0.01)
NRBC BLD-RTO: 0 PER 100 WBC
PLATELET # BLD AUTO: 367 K/UL (ref 150–400)
PMV BLD AUTO: 10.8 FL (ref 8.9–12.9)
POTASSIUM SERPL-SCNC: 3.4 MMOL/L (ref 3.5–5.1)
PROT SERPL-MCNC: 6.6 G/DL (ref 6.4–8.2)
RBC # BLD AUTO: 3.64 M/UL (ref 3.8–5.2)
RBC MORPH BLD: ABNORMAL
RBC MORPH BLD: ABNORMAL
SODIUM SERPL-SCNC: 138 MMOL/L (ref 136–145)
SPECIMEN HOLD: NORMAL
WBC # BLD AUTO: 6.5 K/UL (ref 3.6–11)

## 2024-07-15 PROCEDURE — 93005 ELECTROCARDIOGRAM TRACING: CPT | Performed by: STUDENT IN AN ORGANIZED HEALTH CARE EDUCATION/TRAINING PROGRAM

## 2024-07-15 PROCEDURE — 36415 COLL VENOUS BLD VENIPUNCTURE: CPT

## 2024-07-15 PROCEDURE — 99284 EMERGENCY DEPT VISIT MOD MDM: CPT

## 2024-07-15 PROCEDURE — 80053 COMPREHEN METABOLIC PANEL: CPT

## 2024-07-15 PROCEDURE — 85025 COMPLETE CBC W/AUTO DIFF WBC: CPT

## 2024-07-15 NOTE — ED NOTES
Pt found to have had BM. Pt clothing, brief, chucks pad changed. Mary care performed. Pt tolerated well.

## 2024-07-15 NOTE — ED NOTES
Hospital to home at bedside to transport pt home. Discharge instructions reviewed and in hand, pt and  verbalized understanding. Patient out of ED on stretcher with hospital to home staff, no acute distress noted.

## 2024-07-15 NOTE — DISCHARGE INSTRUCTIONS
Please discontinue your amlodipine. Please follow up with your doctor this week for a recheck of your blood pressure

## 2024-07-15 NOTE — ED PROVIDER NOTES
FERROUS SULFATE (IRON 325) 325 (65 FE) MG TABLET    Take 1 tablet by mouth daily (with breakfast)    FLUTICASONE-SALMETEROL (ADVAIR) 250-50 MCG/ACT AEPB DISKUS INHALER    Inhale 1 puff into the lungs in the morning and 1 puff in the evening.    INSULIN NPH (HUMULIN N;NOVOLIN N) 100 UNIT/ML INJECTION VIAL    Inject 8 Units into the skin daily for 3 days    ISOSORBIDE MONONITRATE (IMDUR) 60 MG EXTENDED RELEASE TABLET    Take 1 tablet by mouth daily    LIDOCAINE, ANORECTAL, 5 % CREA    Apply topically in the morning and at bedtime    LOPERAMIDE (IMODIUM) 2 MG CAPSULE    Take 1 capsule by mouth 4 times daily as needed for Diarrhea    MAGNESIUM OXIDE (MAG-OX) 400 MG TABLET    Take 1 tablet by mouth 2 times daily    MELATONIN 5 MG CAPS    Take 1 tablet by mouth at bedtime    NALOXONE (NARCAN) 4 MG/0.1ML LIQD NASAL SPRAY    1 spray by Nasal route as needed for Opioid Reversal    NYSTATIN (MYCOSTATIN) 352931 UNIT/GM CREAM    Apply topically 2 times daily.    OMEPRAZOLE (PRILOSEC) 40 MG DELAYED RELEASE CAPSULE    Take 1 capsule by mouth daily    POLYETHYLENE GLYCOL (GLYCOLAX) 17 GM/SCOOP POWDER    Take 17 g by mouth daily    PREGABALIN (LYRICA) 150 MG CAPSULE    Take 1 capsule by mouth daily for 30 days. Max Daily Amount: 150 mg    SIMETHICONE PO    Take 1 tablet by mouth every 6-8 hours as needed    SODIUM CHLORIDE (OCEAN) 0.65 % NASAL SPRAY    2 sprays by Nasal route every 8 hours as needed for Congestion    TRAMADOL (ULTRAM) 50 MG TABLET    Take 1 tablet by mouth every 8 hours as needed for Pain for up to 7 days. Intended supply: 5 days. Take lowest dose possible to manage pain Max Daily Amount: 150 mg    TRIAMCINOLONE (KENALOG) 0.1 % CREAM    Apply topically 2 times daily.    VITAMIN D 25 MCG (1000 UT) CAPS    Take 1 capsule by mouth daily       SCREENINGS               No data recorded        PHYSICAL EXAM      ED Triage Vitals [07/15/24 1430]   Enc Vitals Group      BP 97/82      Pulse 80      Respirations 18      Temp

## 2024-07-16 LAB
EKG ATRIAL RATE: 88 BPM
EKG DIAGNOSIS: NORMAL
EKG Q-T INTERVAL: 448 MS
EKG QRS DURATION: 152 MS
EKG QTC CALCULATION (BAZETT): 539 MS
EKG R AXIS: 240 DEGREES
EKG T AXIS: 58 DEGREES
EKG VENTRICULAR RATE: 87 BPM

## 2024-07-25 ENCOUNTER — TELEPHONE (OUTPATIENT)
Age: 74
End: 2024-07-25

## 2024-07-30 ENCOUNTER — TELEPHONE (OUTPATIENT)
Age: 74
End: 2024-07-30

## 2024-07-30 NOTE — TELEPHONE ENCOUNTER
----- Message from Debbie Harry Apryl sent at 7/26/2024 10:39 AM EDT -----  Regarding: [facility] Appointment Request  [facility] Appointment Request    Patient needs appointment for [facility] Appointment Type: New to Provider.    Patient Requested Dates(s): as soon as possible   Patient Requested Time: anytime   Provider Name: Elida Palacios, LIZZY-CNP    Reason for Appointment Request: Established Patient - Available appointments did not meet patient need  --------------------------------------------------------------------------------------------------------------------------    Relationship to Patient: Spouse/Partner     Call Back Information: OK to leave message on voicemail  Preferred Call Back Number: [phone]

## 2024-08-02 ENCOUNTER — HOME HEALTH ADMISSION (OUTPATIENT)
Dept: HOME HEALTH SERVICES | Facility: HOME HEALTH | Age: 74
End: 2024-08-02

## 2024-08-11 ENCOUNTER — APPOINTMENT (OUTPATIENT)
Facility: HOSPITAL | Age: 74
End: 2024-08-11
Payer: MEDICARE

## 2024-08-11 ENCOUNTER — HOSPITAL ENCOUNTER (EMERGENCY)
Facility: HOSPITAL | Age: 74
Discharge: HOME | End: 2024-08-11
Attending: STUDENT IN AN ORGANIZED HEALTH CARE EDUCATION/TRAINING PROGRAM
Payer: MEDICARE

## 2024-08-11 VITALS
RESPIRATION RATE: 19 BRPM | WEIGHT: 240 LBS | TEMPERATURE: 98.7 F | OXYGEN SATURATION: 93 % | HEART RATE: 80 BPM | DIASTOLIC BLOOD PRESSURE: 102 MMHG | BODY MASS INDEX: 35.55 KG/M2 | HEIGHT: 69 IN | SYSTOLIC BLOOD PRESSURE: 183 MMHG

## 2024-08-11 DIAGNOSIS — Z20.822 CLOSE EXPOSURE TO COVID-19 VIRUS: ICD-10-CM

## 2024-08-11 DIAGNOSIS — R06.02 SHORTNESS OF BREATH: ICD-10-CM

## 2024-08-11 DIAGNOSIS — R11.2 NAUSEA AND VOMITING, UNSPECIFIED VOMITING TYPE: Primary | ICD-10-CM

## 2024-08-11 LAB
ALBUMIN SERPL-MCNC: 2.7 G/DL (ref 3.5–5)
ALBUMIN/GLOB SERPL: 0.8 (ref 1.1–2.2)
ALP SERPL-CCNC: 180 U/L (ref 45–117)
ALT SERPL-CCNC: 12 U/L (ref 12–78)
ANION GAP SERPL CALC-SCNC: 3 MMOL/L (ref 5–15)
AST SERPL-CCNC: 14 U/L (ref 15–37)
BASOPHILS # BLD: 0 K/UL (ref 0–0.1)
BASOPHILS NFR BLD: 0 % (ref 0–1)
BILIRUB SERPL-MCNC: 0.6 MG/DL (ref 0.2–1)
BUN SERPL-MCNC: 32 MG/DL (ref 6–20)
BUN/CREAT SERPL: 19 (ref 12–20)
CALCIUM SERPL-MCNC: 9.2 MG/DL (ref 8.5–10.1)
CHLORIDE SERPL-SCNC: 113 MMOL/L (ref 97–108)
CO2 SERPL-SCNC: 31 MMOL/L (ref 21–32)
CREAT SERPL-MCNC: 1.68 MG/DL (ref 0.55–1.02)
DIFFERENTIAL METHOD BLD: ABNORMAL
EOSINOPHIL # BLD: 0.3 K/UL (ref 0–0.4)
EOSINOPHIL NFR BLD: 6 % (ref 0–7)
ERYTHROCYTE [DISTWIDTH] IN BLOOD BY AUTOMATED COUNT: 17.3 % (ref 11.5–14.5)
FLUAV RNA SPEC QL NAA+PROBE: NOT DETECTED
FLUBV RNA SPEC QL NAA+PROBE: NOT DETECTED
GLOBULIN SER CALC-MCNC: 3.6 G/DL (ref 2–4)
GLUCOSE SERPL-MCNC: 129 MG/DL (ref 65–100)
HCT VFR BLD AUTO: 34.2 % (ref 35–47)
HGB BLD-MCNC: 9.7 G/DL (ref 11.5–16)
IMM GRANULOCYTES # BLD AUTO: 0.1 K/UL (ref 0–0.04)
IMM GRANULOCYTES NFR BLD AUTO: 1 % (ref 0–0.5)
LYMPHOCYTES # BLD: 0.7 K/UL (ref 0.8–3.5)
LYMPHOCYTES NFR BLD: 12 % (ref 12–49)
MCH RBC QN AUTO: 24.9 PG (ref 26–34)
MCHC RBC AUTO-ENTMCNC: 28.4 G/DL (ref 30–36.5)
MCV RBC AUTO: 87.9 FL (ref 80–99)
MONOCYTES # BLD: 0.3 K/UL (ref 0–1)
MONOCYTES NFR BLD: 6 % (ref 5–13)
NEUTS SEG # BLD: 4.4 K/UL (ref 1.8–8)
NEUTS SEG NFR BLD: 75 % (ref 32–75)
NRBC # BLD: 0 K/UL (ref 0–0.01)
NRBC BLD-RTO: 0 PER 100 WBC
NT PRO BNP: 9931 PG/ML
PLATELET # BLD AUTO: 158 K/UL (ref 150–400)
PMV BLD AUTO: 11.9 FL (ref 8.9–12.9)
POTASSIUM SERPL-SCNC: 3.7 MMOL/L (ref 3.5–5.1)
PROT SERPL-MCNC: 6.3 G/DL (ref 6.4–8.2)
RBC # BLD AUTO: 3.89 M/UL (ref 3.8–5.2)
RBC MORPH BLD: ABNORMAL
SARS-COV-2 RNA RESP QL NAA+PROBE: NOT DETECTED
SODIUM SERPL-SCNC: 147 MMOL/L (ref 136–145)
TROPONIN I SERPL HS-MCNC: 42 NG/L (ref 0–51)
WBC # BLD AUTO: 5.8 K/UL (ref 3.6–11)

## 2024-08-11 PROCEDURE — 85025 COMPLETE CBC W/AUTO DIFF WBC: CPT

## 2024-08-11 PROCEDURE — 96375 TX/PRO/DX INJ NEW DRUG ADDON: CPT

## 2024-08-11 PROCEDURE — 99285 EMERGENCY DEPT VISIT HI MDM: CPT

## 2024-08-11 PROCEDURE — 6360000002 HC RX W HCPCS: Performed by: STUDENT IN AN ORGANIZED HEALTH CARE EDUCATION/TRAINING PROGRAM

## 2024-08-11 PROCEDURE — 6370000000 HC RX 637 (ALT 250 FOR IP): Performed by: STUDENT IN AN ORGANIZED HEALTH CARE EDUCATION/TRAINING PROGRAM

## 2024-08-11 PROCEDURE — 83880 ASSAY OF NATRIURETIC PEPTIDE: CPT

## 2024-08-11 PROCEDURE — 87636 SARSCOV2 & INF A&B AMP PRB: CPT

## 2024-08-11 PROCEDURE — 94640 AIRWAY INHALATION TREATMENT: CPT

## 2024-08-11 PROCEDURE — 71045 X-RAY EXAM CHEST 1 VIEW: CPT

## 2024-08-11 PROCEDURE — 96374 THER/PROPH/DIAG INJ IV PUSH: CPT

## 2024-08-11 PROCEDURE — 80053 COMPREHEN METABOLIC PANEL: CPT

## 2024-08-11 PROCEDURE — 36415 COLL VENOUS BLD VENIPUNCTURE: CPT

## 2024-08-11 PROCEDURE — 2700000000 HC OXYGEN THERAPY PER DAY

## 2024-08-11 PROCEDURE — 93005 ELECTROCARDIOGRAM TRACING: CPT | Performed by: STUDENT IN AN ORGANIZED HEALTH CARE EDUCATION/TRAINING PROGRAM

## 2024-08-11 PROCEDURE — 94664 DEMO&/EVAL PT USE INHALER: CPT

## 2024-08-11 PROCEDURE — 84484 ASSAY OF TROPONIN QUANT: CPT

## 2024-08-11 RX ORDER — IPRATROPIUM BROMIDE AND ALBUTEROL SULFATE 2.5; .5 MG/3ML; MG/3ML
1 SOLUTION RESPIRATORY (INHALATION)
Status: COMPLETED | OUTPATIENT
Start: 2024-08-11 | End: 2024-08-11

## 2024-08-11 RX ORDER — ONDANSETRON 4 MG/1
4 TABLET, ORALLY DISINTEGRATING ORAL 3 TIMES DAILY PRN
Qty: 21 TABLET | Refills: 0 | Status: ON HOLD | OUTPATIENT
Start: 2024-08-11

## 2024-08-11 RX ORDER — ONDANSETRON 2 MG/ML
4 INJECTION INTRAMUSCULAR; INTRAVENOUS ONCE
Status: COMPLETED | OUTPATIENT
Start: 2024-08-11 | End: 2024-08-11

## 2024-08-11 RX ORDER — BUMETANIDE 0.25 MG/ML
1 INJECTION INTRAMUSCULAR; INTRAVENOUS ONCE
Status: COMPLETED | OUTPATIENT
Start: 2024-08-11 | End: 2024-08-11

## 2024-08-11 RX ADMIN — IPRATROPIUM BROMIDE AND ALBUTEROL SULFATE 1 DOSE: .5; 3 SOLUTION RESPIRATORY (INHALATION) at 21:03

## 2024-08-11 RX ADMIN — ONDANSETRON 4 MG: 2 INJECTION INTRAMUSCULAR; INTRAVENOUS at 23:04

## 2024-08-11 RX ADMIN — ONDANSETRON 4 MG: 2 INJECTION INTRAMUSCULAR; INTRAVENOUS at 19:21

## 2024-08-11 RX ADMIN — BUMETANIDE 1 MG: 0.25 INJECTION INTRAMUSCULAR; INTRAVENOUS at 20:56

## 2024-08-11 RX ADMIN — IPRATROPIUM BROMIDE AND ALBUTEROL SULFATE 1 DOSE: .5; 3 SOLUTION RESPIRATORY (INHALATION) at 19:22

## 2024-08-11 ASSESSMENT — LIFESTYLE VARIABLES
HOW OFTEN DO YOU HAVE A DRINK CONTAINING ALCOHOL: NEVER
HOW MANY STANDARD DRINKS CONTAINING ALCOHOL DO YOU HAVE ON A TYPICAL DAY: PATIENT DOES NOT DRINK

## 2024-08-11 NOTE — ED NOTES
ED visit d/t SOB - onset of sx, 1730 - sudden at home - since onset alsos reports worsening nausea and vomiting at home, vomited x3 - also endorses fatigue / muscle aches - baseline of 2 L NC - EMS increased to 4 L NC - hx of CHF - NOTE, presents with an ortho boot to (L) LE - hx of ankle fracture with repair which then got infected requiring revision, last ankle procedure took place, end of May 2024;; speaking in full sentences at this time;

## 2024-08-12 NOTE — ED NOTES
Patient resting in stretcher at this time. Patient is currently 96% on 4L NC at this time. Patient denies change in symptoms, endorses improvement to respirations after duo-neb administration. Siderails x2 in upright position, call light within reach. Updated patient on plan of care.

## 2024-08-12 NOTE — ED NOTES
Patient reports removal of dialysis access on Thursday, 08/08/2024 after completion of temporary dialysis. Patient's schedule noted as MWF, states she last received dialysis on Wednesday, 08/07/2024. Patient denies changes in condition prior to ED visit.    Confirmed past medical history with patient to include CHF, HTN, A-Fib, and COPD. Patient takes Bumex daily and denies any missed doses of medication. Per chart review, patient had a watchman device placed recently with resolution to irregular rhythms; patient's monitor confirms paced rhythm.

## 2024-08-12 NOTE — ED NOTES
Patient to ED with increase in shortness of breath. Patient wears 2L of O2 by nasal cannula at baseline, increased to 4L by EMS during transportation. Patient is maintaining 95% on 4L NC at this time. Patient reports improvement in symptoms after duo-neb administration, denies full resolution of symptoms. Patient is resting in stretcher with no visible or reported distress at this time.  at bedside.

## 2024-08-14 ENCOUNTER — APPOINTMENT (OUTPATIENT)
Facility: HOSPITAL | Age: 74
DRG: 689 | End: 2024-08-14
Payer: MEDICARE

## 2024-08-14 ENCOUNTER — HOSPITAL ENCOUNTER (INPATIENT)
Facility: HOSPITAL | Age: 74
LOS: 4 days | Discharge: HOME HEALTH | DRG: 689 | End: 2024-08-19
Attending: EMERGENCY MEDICINE | Admitting: STUDENT IN AN ORGANIZED HEALTH CARE EDUCATION/TRAINING PROGRAM
Payer: MEDICARE

## 2024-08-14 DIAGNOSIS — G93.40 ACUTE ENCEPHALOPATHY: Primary | ICD-10-CM

## 2024-08-14 DIAGNOSIS — N39.0 COMPLICATED UTI (URINARY TRACT INFECTION): ICD-10-CM

## 2024-08-14 DIAGNOSIS — N18.9 CHRONIC KIDNEY DISEASE, UNSPECIFIED CKD STAGE: ICD-10-CM

## 2024-08-14 DIAGNOSIS — E87.0 HYPERNATREMIA: ICD-10-CM

## 2024-08-14 DIAGNOSIS — J96.11 CHRONIC HYPOXEMIC RESPIRATORY FAILURE (HCC): ICD-10-CM

## 2024-08-14 LAB
ALBUMIN SERPL-MCNC: 2.7 G/DL (ref 3.5–5)
ALBUMIN/GLOB SERPL: 0.8 (ref 1.1–2.2)
ALP SERPL-CCNC: 167 U/L (ref 45–117)
ALT SERPL-CCNC: 12 U/L (ref 12–78)
ANION GAP BLD CALC-SCNC: 1 (ref 10–20)
ANION GAP SERPL CALC-SCNC: 4 MMOL/L (ref 5–15)
APPEARANCE UR: ABNORMAL
AST SERPL-CCNC: 10 U/L (ref 15–37)
BACTERIA URNS QL MICRO: ABNORMAL /HPF
BASE EXCESS BLD CALC-SCNC: 8.7 MMOL/L
BASOPHILS # BLD: 0 K/UL (ref 0–0.1)
BASOPHILS NFR BLD: 0 % (ref 0–1)
BILIRUB SERPL-MCNC: 0.5 MG/DL (ref 0.2–1)
BILIRUB UR QL: NEGATIVE
BUN SERPL-MCNC: 28 MG/DL (ref 6–20)
BUN/CREAT SERPL: 16 (ref 12–20)
CA-I BLD-MCNC: 1.35 MMOL/L (ref 1.12–1.32)
CALCIUM SERPL-MCNC: 9.2 MG/DL (ref 8.5–10.1)
CHLORIDE BLD-SCNC: 109 MMOL/L (ref 100–108)
CHLORIDE SERPL-SCNC: 111 MMOL/L (ref 97–108)
CO2 BLD-SCNC: 37 MMOL/L (ref 19–24)
CO2 SERPL-SCNC: 32 MMOL/L (ref 21–32)
COLOR UR: ABNORMAL
COMMENT:: NORMAL
CREAT SERPL-MCNC: 1.73 MG/DL (ref 0.55–1.02)
CREAT UR-MCNC: 1.7 MG/DL (ref 0.6–1.3)
DIFFERENTIAL METHOD BLD: ABNORMAL
EKG ATRIAL RATE: 105 BPM
EKG DIAGNOSIS: NORMAL
EKG Q-T INTERVAL: 428 MS
EKG QRS DURATION: 148 MS
EKG QTC CALCULATION (BAZETT): 493 MS
EKG R AXIS: 249 DEGREES
EKG T AXIS: 62 DEGREES
EKG VENTRICULAR RATE: 80 BPM
EOSINOPHIL # BLD: 0.4 K/UL (ref 0–0.4)
EOSINOPHIL NFR BLD: 7 % (ref 0–7)
EPITH CASTS URNS QL MICRO: ABNORMAL /LPF
ERYTHROCYTE [DISTWIDTH] IN BLOOD BY AUTOMATED COUNT: 17.4 % (ref 11.5–14.5)
GLOBULIN SER CALC-MCNC: 3.6 G/DL (ref 2–4)
GLUCOSE BLD STRIP.AUTO-MCNC: 101 MG/DL (ref 74–99)
GLUCOSE SERPL-MCNC: 115 MG/DL (ref 65–100)
GLUCOSE UR STRIP.AUTO-MCNC: NEGATIVE MG/DL
HCO3 BLDA-SCNC: 36 MMOL/L
HCT VFR BLD AUTO: 32.8 % (ref 35–47)
HGB BLD-MCNC: 9.4 G/DL (ref 11.5–16)
HGB UR QL STRIP: ABNORMAL
IMM GRANULOCYTES # BLD AUTO: 0.1 K/UL (ref 0–0.04)
IMM GRANULOCYTES NFR BLD AUTO: 1 % (ref 0–0.5)
KETONES UR QL STRIP.AUTO: NEGATIVE MG/DL
LACTATE BLD-SCNC: 0.85 MMOL/L (ref 0.4–2)
LEUKOCYTE ESTERASE UR QL STRIP.AUTO: ABNORMAL
LYMPHOCYTES # BLD: 0.8 K/UL (ref 0.8–3.5)
LYMPHOCYTES NFR BLD: 15 % (ref 12–49)
MAGNESIUM SERPL-MCNC: 1.8 MG/DL (ref 1.6–2.4)
MCH RBC QN AUTO: 25.1 PG (ref 26–34)
MCHC RBC AUTO-ENTMCNC: 28.7 G/DL (ref 30–36.5)
MCV RBC AUTO: 87.7 FL (ref 80–99)
MONOCYTES # BLD: 0.4 K/UL (ref 0–1)
MONOCYTES NFR BLD: 8 % (ref 5–13)
NEUTS SEG # BLD: 3.5 K/UL (ref 1.8–8)
NEUTS SEG NFR BLD: 69 % (ref 32–75)
NITRITE UR QL STRIP.AUTO: POSITIVE
NRBC # BLD: 0 K/UL (ref 0–0.01)
NRBC BLD-RTO: 0 PER 100 WBC
PCO2 BLDV: 61.7 MMHG (ref 41–51)
PH BLDV: 7.37 (ref 7.32–7.42)
PH UR STRIP: 5.5 (ref 5–8)
PLATELET # BLD AUTO: 168 K/UL (ref 150–400)
PMV BLD AUTO: 12.1 FL (ref 8.9–12.9)
PO2 BLDV: 42 MMHG (ref 25–40)
POTASSIUM BLD-SCNC: 3.7 MMOL/L (ref 3.5–5.5)
POTASSIUM SERPL-SCNC: 3.5 MMOL/L (ref 3.5–5.1)
PROCALCITONIN SERPL-MCNC: <0.05 NG/ML
PROT SERPL-MCNC: 6.3 G/DL (ref 6.4–8.2)
PROT UR STRIP-MCNC: 30 MG/DL
RBC # BLD AUTO: 3.74 M/UL (ref 3.8–5.2)
RBC #/AREA URNS HPF: ABNORMAL /HPF (ref 0–5)
RBC MORPH BLD: ABNORMAL
SAO2 % BLD: 74 %
SODIUM BLD-SCNC: 147 MMOL/L (ref 136–145)
SODIUM SERPL-SCNC: 147 MMOL/L (ref 136–145)
SP GR UR REFRACTOMETRY: 1.02
SPECIMEN HOLD: NORMAL
SPECIMEN SITE: ABNORMAL
TROPONIN I SERPL HS-MCNC: 31 NG/L (ref 0–51)
URINE CULTURE IF INDICATED: ABNORMAL
UROBILINOGEN UR QL STRIP.AUTO: 0.2 EU/DL (ref 0.2–1)
WBC # BLD AUTO: 5.2 K/UL (ref 3.6–11)
WBC URNS QL MICRO: >100 /HPF (ref 0–4)

## 2024-08-14 PROCEDURE — 82330 ASSAY OF CALCIUM: CPT

## 2024-08-14 PROCEDURE — 84145 PROCALCITONIN (PCT): CPT

## 2024-08-14 PROCEDURE — 82803 BLOOD GASES ANY COMBINATION: CPT

## 2024-08-14 PROCEDURE — 36415 COLL VENOUS BLD VENIPUNCTURE: CPT

## 2024-08-14 PROCEDURE — 87086 URINE CULTURE/COLONY COUNT: CPT

## 2024-08-14 PROCEDURE — 81001 URINALYSIS AUTO W/SCOPE: CPT

## 2024-08-14 PROCEDURE — 84484 ASSAY OF TROPONIN QUANT: CPT

## 2024-08-14 PROCEDURE — 84132 ASSAY OF SERUM POTASSIUM: CPT

## 2024-08-14 PROCEDURE — 99285 EMERGENCY DEPT VISIT HI MDM: CPT

## 2024-08-14 PROCEDURE — 70450 CT HEAD/BRAIN W/O DYE: CPT

## 2024-08-14 PROCEDURE — 87040 BLOOD CULTURE FOR BACTERIA: CPT

## 2024-08-14 PROCEDURE — 84295 ASSAY OF SERUM SODIUM: CPT

## 2024-08-14 PROCEDURE — 93005 ELECTROCARDIOGRAM TRACING: CPT | Performed by: EMERGENCY MEDICINE

## 2024-08-14 PROCEDURE — 87088 URINE BACTERIA CULTURE: CPT

## 2024-08-14 PROCEDURE — 80053 COMPREHEN METABOLIC PANEL: CPT

## 2024-08-14 PROCEDURE — 85025 COMPLETE CBC W/AUTO DIFF WBC: CPT

## 2024-08-14 PROCEDURE — 87186 SC STD MICRODIL/AGAR DIL: CPT

## 2024-08-14 PROCEDURE — 83735 ASSAY OF MAGNESIUM: CPT

## 2024-08-14 PROCEDURE — 82947 ASSAY GLUCOSE BLOOD QUANT: CPT

## 2024-08-14 PROCEDURE — 2580000003 HC RX 258: Performed by: EMERGENCY MEDICINE

## 2024-08-14 PROCEDURE — 71045 X-RAY EXAM CHEST 1 VIEW: CPT

## 2024-08-14 RX ORDER — 0.9 % SODIUM CHLORIDE 0.9 %
1000 INTRAVENOUS SOLUTION INTRAVENOUS ONCE
Status: COMPLETED | OUTPATIENT
Start: 2024-08-14 | End: 2024-08-15

## 2024-08-14 RX ORDER — 0.9 % SODIUM CHLORIDE 0.9 %
30 INTRAVENOUS SOLUTION INTRAVENOUS ONCE
Status: DISCONTINUED | OUTPATIENT
Start: 2024-08-14 | End: 2024-08-14

## 2024-08-14 RX ADMIN — SODIUM CHLORIDE 1000 ML: 9 INJECTION, SOLUTION INTRAVENOUS at 22:50

## 2024-08-15 PROBLEM — N39.0 COMPLICATED UTI (URINARY TRACT INFECTION): Status: ACTIVE | Noted: 2024-08-15

## 2024-08-15 LAB
ANION GAP SERPL CALC-SCNC: 8 MMOL/L (ref 5–15)
BASOPHILS # BLD: 0.1 K/UL (ref 0–0.1)
BASOPHILS NFR BLD: 1 % (ref 0–1)
BUN SERPL-MCNC: 25 MG/DL (ref 6–20)
BUN/CREAT SERPL: 16 (ref 12–20)
CALCIUM SERPL-MCNC: 9.1 MG/DL (ref 8.5–10.1)
CHLORIDE SERPL-SCNC: 112 MMOL/L (ref 97–108)
CO2 SERPL-SCNC: 27 MMOL/L (ref 21–32)
CREAT SERPL-MCNC: 1.54 MG/DL (ref 0.55–1.02)
DIFFERENTIAL METHOD BLD: ABNORMAL
EKG ATRIAL RATE: 84 BPM
EKG DIAGNOSIS: NORMAL
EKG Q-T INTERVAL: 436 MS
EKG QRS DURATION: 148 MS
EKG QTC CALCULATION (BAZETT): 502 MS
EKG R AXIS: 251 DEGREES
EKG T AXIS: 67 DEGREES
EKG VENTRICULAR RATE: 80 BPM
EOSINOPHIL # BLD: 0.4 K/UL (ref 0–0.4)
EOSINOPHIL NFR BLD: 7 % (ref 0–7)
ERYTHROCYTE [DISTWIDTH] IN BLOOD BY AUTOMATED COUNT: 17.4 % (ref 11.5–14.5)
GLUCOSE SERPL-MCNC: 114 MG/DL (ref 65–100)
HCT VFR BLD AUTO: 34.1 % (ref 35–47)
HGB BLD-MCNC: 9.6 G/DL (ref 11.5–16)
IMM GRANULOCYTES # BLD AUTO: 0 K/UL (ref 0–0.04)
IMM GRANULOCYTES NFR BLD AUTO: 0 % (ref 0–0.5)
LYMPHOCYTES # BLD: 0.7 K/UL (ref 0.8–3.5)
LYMPHOCYTES NFR BLD: 13 % (ref 12–49)
MAGNESIUM SERPL-MCNC: 1.6 MG/DL (ref 1.6–2.4)
MCH RBC QN AUTO: 24.9 PG (ref 26–34)
MCHC RBC AUTO-ENTMCNC: 28.2 G/DL (ref 30–36.5)
MCV RBC AUTO: 88.3 FL (ref 80–99)
MONOCYTES # BLD: 0.4 K/UL (ref 0–1)
MONOCYTES NFR BLD: 8 % (ref 5–13)
NEUTS SEG # BLD: 4 K/UL (ref 1.8–8)
NEUTS SEG NFR BLD: 71 % (ref 32–75)
NRBC # BLD: 0 K/UL (ref 0–0.01)
NRBC BLD-RTO: 0 PER 100 WBC
PLATELET # BLD AUTO: 147 K/UL (ref 150–400)
PMV BLD AUTO: 11.5 FL (ref 8.9–12.9)
POTASSIUM SERPL-SCNC: 3.5 MMOL/L (ref 3.5–5.1)
RBC # BLD AUTO: 3.86 M/UL (ref 3.8–5.2)
RBC MORPH BLD: ABNORMAL
SODIUM SERPL-SCNC: 147 MMOL/L (ref 136–145)
WBC # BLD AUTO: 5.6 K/UL (ref 3.6–11)

## 2024-08-15 PROCEDURE — 97530 THERAPEUTIC ACTIVITIES: CPT

## 2024-08-15 PROCEDURE — 2580000003 HC RX 258: Performed by: STUDENT IN AN ORGANIZED HEALTH CARE EDUCATION/TRAINING PROGRAM

## 2024-08-15 PROCEDURE — 36415 COLL VENOUS BLD VENIPUNCTURE: CPT

## 2024-08-15 PROCEDURE — 97165 OT EVAL LOW COMPLEX 30 MIN: CPT

## 2024-08-15 PROCEDURE — 6360000002 HC RX W HCPCS: Performed by: STUDENT IN AN ORGANIZED HEALTH CARE EDUCATION/TRAINING PROGRAM

## 2024-08-15 PROCEDURE — 83735 ASSAY OF MAGNESIUM: CPT

## 2024-08-15 PROCEDURE — 2700000000 HC OXYGEN THERAPY PER DAY

## 2024-08-15 PROCEDURE — 97163 PT EVAL HIGH COMPLEX 45 MIN: CPT

## 2024-08-15 PROCEDURE — 80048 BASIC METABOLIC PNL TOTAL CA: CPT

## 2024-08-15 PROCEDURE — 97535 SELF CARE MNGMENT TRAINING: CPT

## 2024-08-15 PROCEDURE — 6370000000 HC RX 637 (ALT 250 FOR IP): Performed by: NURSE PRACTITIONER

## 2024-08-15 PROCEDURE — 94761 N-INVAS EAR/PLS OXIMETRY MLT: CPT

## 2024-08-15 PROCEDURE — 6370000000 HC RX 637 (ALT 250 FOR IP): Performed by: STUDENT IN AN ORGANIZED HEALTH CARE EDUCATION/TRAINING PROGRAM

## 2024-08-15 PROCEDURE — 6360000002 HC RX W HCPCS: Performed by: NURSE PRACTITIONER

## 2024-08-15 PROCEDURE — 1100000003 HC PRIVATE W/ TELEMETRY

## 2024-08-15 PROCEDURE — 94640 AIRWAY INHALATION TREATMENT: CPT

## 2024-08-15 PROCEDURE — 85025 COMPLETE CBC W/AUTO DIFF WBC: CPT

## 2024-08-15 RX ORDER — ENOXAPARIN SODIUM 100 MG/ML
30 INJECTION SUBCUTANEOUS 2 TIMES DAILY
Status: DISCONTINUED | OUTPATIENT
Start: 2024-08-15 | End: 2024-08-19 | Stop reason: HOSPADM

## 2024-08-15 RX ORDER — POLYETHYLENE GLYCOL 3350 17 G/17G
17 POWDER, FOR SOLUTION ORAL DAILY PRN
Status: DISCONTINUED | OUTPATIENT
Start: 2024-08-15 | End: 2024-08-19 | Stop reason: HOSPADM

## 2024-08-15 RX ORDER — SODIUM CHLORIDE 450 MG/100ML
INJECTION, SOLUTION INTRAVENOUS CONTINUOUS
Status: ACTIVE | OUTPATIENT
Start: 2024-08-15 | End: 2024-08-15

## 2024-08-15 RX ORDER — SODIUM CHLORIDE 0.9 % (FLUSH) 0.9 %
5-40 SYRINGE (ML) INJECTION EVERY 12 HOURS SCHEDULED
Status: DISCONTINUED | OUTPATIENT
Start: 2024-08-15 | End: 2024-08-19 | Stop reason: HOSPADM

## 2024-08-15 RX ORDER — AMLODIPINE BESYLATE 5 MG/1
5 TABLET ORAL DAILY
Status: DISCONTINUED | OUTPATIENT
Start: 2024-08-15 | End: 2024-08-19 | Stop reason: HOSPADM

## 2024-08-15 RX ORDER — ISOSORBIDE MONONITRATE 30 MG/1
60 TABLET, EXTENDED RELEASE ORAL DAILY
Status: DISCONTINUED | OUTPATIENT
Start: 2024-08-15 | End: 2024-08-19 | Stop reason: HOSPADM

## 2024-08-15 RX ORDER — OXYCODONE HYDROCHLORIDE AND ACETAMINOPHEN 5; 325 MG/1; MG/1
1 TABLET ORAL EVERY 4 HOURS PRN
Status: DISCONTINUED | OUTPATIENT
Start: 2024-08-15 | End: 2024-08-19 | Stop reason: HOSPADM

## 2024-08-15 RX ORDER — NEPHROCAP 1 MG
1 CAP ORAL DAILY
Status: DISCONTINUED | OUTPATIENT
Start: 2024-08-15 | End: 2024-08-19 | Stop reason: HOSPADM

## 2024-08-15 RX ORDER — MORPHINE SULFATE 2 MG/ML
2 INJECTION, SOLUTION INTRAMUSCULAR; INTRAVENOUS ONCE
Status: COMPLETED | OUTPATIENT
Start: 2024-08-16 | End: 2024-08-15

## 2024-08-15 RX ORDER — ONDANSETRON 2 MG/ML
4 INJECTION INTRAMUSCULAR; INTRAVENOUS EVERY 6 HOURS PRN
Status: DISCONTINUED | OUTPATIENT
Start: 2024-08-15 | End: 2024-08-19 | Stop reason: HOSPADM

## 2024-08-15 RX ORDER — SODIUM CHLORIDE 9 MG/ML
INJECTION, SOLUTION INTRAVENOUS PRN
Status: DISCONTINUED | OUTPATIENT
Start: 2024-08-15 | End: 2024-08-19 | Stop reason: HOSPADM

## 2024-08-15 RX ORDER — IPRATROPIUM BROMIDE AND ALBUTEROL SULFATE 2.5; .5 MG/3ML; MG/3ML
1 SOLUTION RESPIRATORY (INHALATION) EVERY 4 HOURS PRN
Status: DISCONTINUED | OUTPATIENT
Start: 2024-08-15 | End: 2024-08-19 | Stop reason: HOSPADM

## 2024-08-15 RX ORDER — LANOLIN ALCOHOL/MO/W.PET/CERES
3 CREAM (GRAM) TOPICAL NIGHTLY PRN
Status: DISCONTINUED | OUTPATIENT
Start: 2024-08-15 | End: 2024-08-19 | Stop reason: HOSPADM

## 2024-08-15 RX ORDER — ACETAMINOPHEN 325 MG/1
650 TABLET ORAL EVERY 6 HOURS PRN
Status: DISCONTINUED | OUTPATIENT
Start: 2024-08-15 | End: 2024-08-19 | Stop reason: HOSPADM

## 2024-08-15 RX ORDER — LANOLIN ALCOHOL/MO/W.PET/CERES
3 CREAM (GRAM) TOPICAL NIGHTLY
Status: DISCONTINUED | OUTPATIENT
Start: 2024-08-15 | End: 2024-08-19 | Stop reason: HOSPADM

## 2024-08-15 RX ORDER — ACETAMINOPHEN 650 MG/1
650 SUPPOSITORY RECTAL EVERY 6 HOURS PRN
Status: DISCONTINUED | OUTPATIENT
Start: 2024-08-15 | End: 2024-08-19 | Stop reason: HOSPADM

## 2024-08-15 RX ORDER — ASPIRIN 81 MG/1
81 TABLET, CHEWABLE ORAL DAILY
Status: DISCONTINUED | OUTPATIENT
Start: 2024-08-15 | End: 2024-08-19 | Stop reason: HOSPADM

## 2024-08-15 RX ORDER — CARVEDILOL 12.5 MG/1
25 TABLET ORAL 2 TIMES DAILY WITH MEALS
Status: DISCONTINUED | OUTPATIENT
Start: 2024-08-15 | End: 2024-08-19 | Stop reason: HOSPADM

## 2024-08-15 RX ORDER — SODIUM CHLORIDE 0.9 % (FLUSH) 0.9 %
5-40 SYRINGE (ML) INJECTION PRN
Status: DISCONTINUED | OUTPATIENT
Start: 2024-08-15 | End: 2024-08-19 | Stop reason: HOSPADM

## 2024-08-15 RX ORDER — PANTOPRAZOLE SODIUM 40 MG/1
40 TABLET, DELAYED RELEASE ORAL
Status: DISCONTINUED | OUTPATIENT
Start: 2024-08-15 | End: 2024-08-19 | Stop reason: HOSPADM

## 2024-08-15 RX ORDER — ONDANSETRON 4 MG/1
4 TABLET, ORALLY DISINTEGRATING ORAL EVERY 8 HOURS PRN
Status: DISCONTINUED | OUTPATIENT
Start: 2024-08-15 | End: 2024-08-19 | Stop reason: HOSPADM

## 2024-08-15 RX ORDER — FERROUS SULFATE 325(65) MG
325 TABLET ORAL
Status: DISCONTINUED | OUTPATIENT
Start: 2024-08-15 | End: 2024-08-19 | Stop reason: HOSPADM

## 2024-08-15 RX ADMIN — ISOSORBIDE MONONITRATE 60 MG: 30 TABLET, EXTENDED RELEASE ORAL at 09:16

## 2024-08-15 RX ADMIN — OXYCODONE HYDROCHLORIDE AND ACETAMINOPHEN 1 TABLET: 5; 325 TABLET ORAL at 16:06

## 2024-08-15 RX ADMIN — AMLODIPINE BESYLATE 5 MG: 5 TABLET ORAL at 09:17

## 2024-08-15 RX ADMIN — RENO CAPS 1 MG: 100; 1.5; 1.7; 20; 10; 1; 150; 5; 6 CAPSULE ORAL at 09:16

## 2024-08-15 RX ADMIN — MICONAZOLE NITRATE: 20 CREAM TOPICAL at 19:59

## 2024-08-15 RX ADMIN — ARFORMOTEROL TARTRATE: 15 SOLUTION RESPIRATORY (INHALATION) at 18:39

## 2024-08-15 RX ADMIN — ARFORMOTEROL TARTRATE: 15 SOLUTION RESPIRATORY (INHALATION) at 07:07

## 2024-08-15 RX ADMIN — FERROUS SULFATE TAB 325 MG (65 MG ELEMENTAL FE) 325 MG: 325 (65 FE) TAB at 09:17

## 2024-08-15 RX ADMIN — MORPHINE SULFATE 2 MG: 2 INJECTION, SOLUTION INTRAMUSCULAR; INTRAVENOUS at 23:43

## 2024-08-15 RX ADMIN — IPRATROPIUM BROMIDE AND ALBUTEROL SULFATE 1 DOSE: .5; 3 SOLUTION RESPIRATORY (INHALATION) at 06:48

## 2024-08-15 RX ADMIN — ENOXAPARIN SODIUM 30 MG: 100 INJECTION SUBCUTANEOUS at 09:27

## 2024-08-15 RX ADMIN — SODIUM CHLORIDE, PRESERVATIVE FREE 10 ML: 5 INJECTION INTRAVENOUS at 19:59

## 2024-08-15 RX ADMIN — CARVEDILOL 25 MG: 12.5 TABLET, FILM COATED ORAL at 17:12

## 2024-08-15 RX ADMIN — SODIUM CHLORIDE, PRESERVATIVE FREE 10 ML: 5 INJECTION INTRAVENOUS at 09:28

## 2024-08-15 RX ADMIN — ENOXAPARIN SODIUM 30 MG: 100 INJECTION SUBCUTANEOUS at 01:26

## 2024-08-15 RX ADMIN — MELATONIN 3 MG: at 19:58

## 2024-08-15 RX ADMIN — MELATONIN 3 MG: at 01:47

## 2024-08-15 RX ADMIN — SODIUM CHLORIDE: 4.5 INJECTION, SOLUTION INTRAVENOUS at 01:25

## 2024-08-15 RX ADMIN — ASPIRIN 81 MG: 81 TABLET, CHEWABLE ORAL at 09:16

## 2024-08-15 RX ADMIN — MICONAZOLE NITRATE: 20 CREAM TOPICAL at 09:28

## 2024-08-15 RX ADMIN — ENOXAPARIN SODIUM 30 MG: 100 INJECTION SUBCUTANEOUS at 19:58

## 2024-08-15 RX ADMIN — CEFTRIAXONE 1000 MG: 1 INJECTION, POWDER, FOR SOLUTION INTRAMUSCULAR; INTRAVENOUS at 23:47

## 2024-08-15 RX ADMIN — CEFTRIAXONE SODIUM 2000 MG: 2 INJECTION, POWDER, FOR SOLUTION INTRAMUSCULAR; INTRAVENOUS at 01:21

## 2024-08-15 RX ADMIN — MICONAZOLE NITRATE: 20 CREAM TOPICAL at 02:52

## 2024-08-15 RX ADMIN — OXYCODONE HYDROCHLORIDE AND ACETAMINOPHEN 1 TABLET: 5; 325 TABLET ORAL at 23:08

## 2024-08-15 RX ADMIN — PANTOPRAZOLE SODIUM 40 MG: 40 TABLET, DELAYED RELEASE ORAL at 06:29

## 2024-08-15 RX ADMIN — MELATONIN 3 MG: at 23:09

## 2024-08-15 RX ADMIN — ACETAMINOPHEN 650 MG: 325 TABLET ORAL at 12:55

## 2024-08-15 RX ADMIN — CARVEDILOL 25 MG: 12.5 TABLET, FILM COATED ORAL at 09:16

## 2024-08-15 ASSESSMENT — PAIN DESCRIPTION - ORIENTATION
ORIENTATION: LEFT

## 2024-08-15 ASSESSMENT — PAIN DESCRIPTION - LOCATION
LOCATION: FOOT;PELVIS
LOCATION: NECK
LOCATION: FOOT

## 2024-08-15 ASSESSMENT — PAIN SCALES - GENERAL
PAINLEVEL_OUTOF10: 6
PAINLEVEL_OUTOF10: 5
PAINLEVEL_OUTOF10: 8
PAINLEVEL_OUTOF10: 6

## 2024-08-15 ASSESSMENT — PAIN DESCRIPTION - DESCRIPTORS
DESCRIPTORS: BURNING
DESCRIPTORS: ACHING

## 2024-08-15 NOTE — ED PROVIDER NOTES
heart disease of native coronary artery with angina pectoris ([facility])     CAD (coronary artery disease)     Chronic systolic heart failure ([facility])     CKD stage G3b/A1, GFR 30-44 and albumin creatinine ratio <30 mg/g ([facility])     STAGE 3B    COPD (chronic obstructive pulmonary disease) ([facility])     WITH (ACUTE) EXACERATION    Dependence on renal dialysis ([facility])     Dependence on supplemental oxygen     Difficulty in walking, not elsewhere classified     Essential hypertension     GERD (gastroesophageal reflux disease)     Hemorrhoids     PAIN    History of diverticulitis     diverticulitis    History of echocardiogram 11/2021    LV: Estimated LVEF is 40 - 45%. Visually measured ejection fraction. Normal cavity size and wall thickness. Globally reduced systolic function.  LA: Moderately dilated left atrium. Left atrial appendage is completely occluded. A Watchman left atrial appendage occluder is present and completely occludes the appendage.    History of migraine     History of MRSA infection     Hypercholesterolemia 01/22/2018    Hypertensive heart and chronic kidney disease with heart failure and stage 1 through stage 4 chronic kidney disease, or unspecified chronic kidney disease ([facility])     Immunodeficiency, unspecified ([facility])     Irritable bowel syndrome     IBS, spinal stenosis    Long term (current) use of insulin ([facility])     Long-term use of high-risk medication 01/22/2018    Lumbar spinal stenosis     Metabolic encephalopathy     Morbid (severe) obesity due to excess calories ([facility])     Muscle weakness (generalized)     Neuropathy     Obesity (BMI 30-39.9) 04/30/2019    Obstructive sleep apnea     uses CPAP    Permanent atrial fibrillation ([facility])     Presence of implantable cardioverter-defibrillator (ICD)     Presence of other cardiac implants and grafts     PACEMAKER    Presence of Watchman left atrial appendage closure device     Primary generalized (osteo)arthritis     Spinal stenosis, lumbar region, without neurogenic

## 2024-08-15 NOTE — ED NOTES
This RN phoned Pt's  (Wesley) with updates. All questions answered for now, and I invited him to phone us any time.

## 2024-08-15 NOTE — PROCEDURES
PROCEDURE NOTE  Date: 8/15/2024   Name: Marilee Oakes  YOB: 1950    Procedures        Patient has a immobilizer to her left leg, under the immobilizer is a wrapped dressing from wound care she had received from a wound clinic. Patient skin was checked over, other than that area. Patient has skin rash to abd folds, groin, excoriation to buttock but unopened areas.

## 2024-08-15 NOTE — H&P
12:28 AM    Please do not hesitate to reach out to myself or assigned provider on-call via [facility] paging system with questions or concerns.     Procedures: see electronic medical records for all procedures/Xrays and details which were not copied into this note but were reviewed prior to creation of Plan.

## 2024-08-15 NOTE — ED TRIAGE NOTES
Pt was alert and oriented upon arrival to ED. Able to answer all questions appropriately including name, birthday, address.

## 2024-08-15 NOTE — ED NOTES
departing at this time and asks to be phoned when decision is made re: discharge or admit. Pt will require stretcher transport.  reports the patient seemed altered because she answered \"84\" when he asked her her age; she answered, \"I don't know who I am!\" when he asked for her name.

## 2024-08-16 LAB
AMMONIA PLAS-SCNC: 29 UMOL/L
ANION GAP SERPL CALC-SCNC: 8 MMOL/L (ref 5–15)
BASOPHILS # BLD: 0.1 K/UL (ref 0–0.1)
BASOPHILS NFR BLD: 1 % (ref 0–1)
BUN SERPL-MCNC: 19 MG/DL (ref 6–20)
BUN/CREAT SERPL: 12 (ref 12–20)
CALCIUM SERPL-MCNC: 9.3 MG/DL (ref 8.5–10.1)
CHLORIDE SERPL-SCNC: 112 MMOL/L (ref 97–108)
CO2 SERPL-SCNC: 28 MMOL/L (ref 21–32)
CREAT SERPL-MCNC: 1.55 MG/DL (ref 0.55–1.02)
DIFFERENTIAL METHOD BLD: ABNORMAL
EOSINOPHIL # BLD: 0.5 K/UL (ref 0–0.4)
EOSINOPHIL NFR BLD: 9 % (ref 0–7)
ERYTHROCYTE [DISTWIDTH] IN BLOOD BY AUTOMATED COUNT: 17.8 % (ref 11.5–14.5)
GLUCOSE SERPL-MCNC: 113 MG/DL (ref 65–100)
HCT VFR BLD AUTO: 34.3 % (ref 35–47)
HGB BLD-MCNC: 10 G/DL (ref 11.5–16)
IMM GRANULOCYTES # BLD AUTO: 0.1 K/UL (ref 0–0.04)
IMM GRANULOCYTES NFR BLD AUTO: 1 % (ref 0–0.5)
LYMPHOCYTES # BLD: 0.7 K/UL (ref 0.8–3.5)
LYMPHOCYTES NFR BLD: 12 % (ref 12–49)
MAGNESIUM SERPL-MCNC: 1.8 MG/DL (ref 1.6–2.4)
MCH RBC QN AUTO: 25.5 PG (ref 26–34)
MCHC RBC AUTO-ENTMCNC: 29.2 G/DL (ref 30–36.5)
MCV RBC AUTO: 87.5 FL (ref 80–99)
MONOCYTES # BLD: 0.4 K/UL (ref 0–1)
MONOCYTES NFR BLD: 7 % (ref 5–13)
NEUTS SEG # BLD: 4.1 K/UL (ref 1.8–8)
NEUTS SEG NFR BLD: 70 % (ref 32–75)
NRBC # BLD: 0 K/UL (ref 0–0.01)
NRBC BLD-RTO: 0 PER 100 WBC
PLATELET # BLD AUTO: 158 K/UL (ref 150–400)
PMV BLD AUTO: 12.3 FL (ref 8.9–12.9)
POTASSIUM SERPL-SCNC: 3.3 MMOL/L (ref 3.5–5.1)
RBC # BLD AUTO: 3.92 M/UL (ref 3.8–5.2)
RBC MORPH BLD: ABNORMAL
SODIUM SERPL-SCNC: 148 MMOL/L (ref 136–145)
WBC # BLD AUTO: 5.9 K/UL (ref 3.6–11)

## 2024-08-16 PROCEDURE — 6370000000 HC RX 637 (ALT 250 FOR IP): Performed by: STUDENT IN AN ORGANIZED HEALTH CARE EDUCATION/TRAINING PROGRAM

## 2024-08-16 PROCEDURE — 36415 COLL VENOUS BLD VENIPUNCTURE: CPT

## 2024-08-16 PROCEDURE — 94640 AIRWAY INHALATION TREATMENT: CPT

## 2024-08-16 PROCEDURE — 83735 ASSAY OF MAGNESIUM: CPT

## 2024-08-16 PROCEDURE — 94761 N-INVAS EAR/PLS OXIMETRY MLT: CPT

## 2024-08-16 PROCEDURE — 85025 COMPLETE CBC W/AUTO DIFF WBC: CPT

## 2024-08-16 PROCEDURE — 6370000000 HC RX 637 (ALT 250 FOR IP): Performed by: NURSE PRACTITIONER

## 2024-08-16 PROCEDURE — 6360000002 HC RX W HCPCS: Performed by: STUDENT IN AN ORGANIZED HEALTH CARE EDUCATION/TRAINING PROGRAM

## 2024-08-16 PROCEDURE — 82140 ASSAY OF AMMONIA: CPT

## 2024-08-16 PROCEDURE — 6360000002 HC RX W HCPCS: Performed by: NURSE PRACTITIONER

## 2024-08-16 PROCEDURE — 1100000003 HC PRIVATE W/ TELEMETRY

## 2024-08-16 PROCEDURE — 99222 1ST HOSP IP/OBS MODERATE 55: CPT | Performed by: INTERNAL MEDICINE

## 2024-08-16 PROCEDURE — 80048 BASIC METABOLIC PNL TOTAL CA: CPT

## 2024-08-16 RX ORDER — DEXTROSE MONOHYDRATE AND SODIUM CHLORIDE 5; .45 G/100ML; G/100ML
INJECTION, SOLUTION INTRAVENOUS CONTINUOUS
Status: DISCONTINUED | OUTPATIENT
Start: 2024-08-16 | End: 2024-08-16

## 2024-08-16 RX ORDER — LORAZEPAM 2 MG/ML
0.5 INJECTION INTRAMUSCULAR ONCE
Status: COMPLETED | OUTPATIENT
Start: 2024-08-16 | End: 2024-08-16

## 2024-08-16 RX ADMIN — ENOXAPARIN SODIUM 30 MG: 100 INJECTION SUBCUTANEOUS at 08:45

## 2024-08-16 RX ADMIN — MELATONIN 3 MG: at 20:23

## 2024-08-16 RX ADMIN — ENOXAPARIN SODIUM 30 MG: 100 INJECTION SUBCUTANEOUS at 20:24

## 2024-08-16 RX ADMIN — RENO CAPS 1 MG: 100; 1.5; 1.7; 20; 10; 1; 150; 5; 6 CAPSULE ORAL at 08:44

## 2024-08-16 RX ADMIN — CARVEDILOL 25 MG: 12.5 TABLET, FILM COATED ORAL at 18:31

## 2024-08-16 RX ADMIN — ACETAMINOPHEN 650 MG: 325 TABLET ORAL at 15:23

## 2024-08-16 RX ADMIN — ACETAMINOPHEN 650 MG: 325 TABLET ORAL at 20:23

## 2024-08-16 RX ADMIN — FERROUS SULFATE TAB 325 MG (65 MG ELEMENTAL FE) 325 MG: 325 (65 FE) TAB at 08:45

## 2024-08-16 RX ADMIN — ASPIRIN 81 MG: 81 TABLET, CHEWABLE ORAL at 08:44

## 2024-08-16 RX ADMIN — ARFORMOTEROL TARTRATE: 15 SOLUTION RESPIRATORY (INHALATION) at 07:22

## 2024-08-16 RX ADMIN — CARVEDILOL 25 MG: 12.5 TABLET, FILM COATED ORAL at 08:45

## 2024-08-16 RX ADMIN — MICONAZOLE NITRATE: 20 CREAM TOPICAL at 08:45

## 2024-08-16 RX ADMIN — AMLODIPINE BESYLATE 5 MG: 5 TABLET ORAL at 08:44

## 2024-08-16 RX ADMIN — ARFORMOTEROL TARTRATE: 15 SOLUTION RESPIRATORY (INHALATION) at 20:12

## 2024-08-16 RX ADMIN — ISOSORBIDE MONONITRATE 60 MG: 30 TABLET, EXTENDED RELEASE ORAL at 08:45

## 2024-08-16 RX ADMIN — LORAZEPAM 0.5 MG: 2 INJECTION INTRAMUSCULAR; INTRAVENOUS at 05:52

## 2024-08-16 RX ADMIN — PANTOPRAZOLE SODIUM 40 MG: 40 TABLET, DELAYED RELEASE ORAL at 05:53

## 2024-08-16 RX ADMIN — OXYCODONE HYDROCHLORIDE AND ACETAMINOPHEN 1 TABLET: 5; 325 TABLET ORAL at 20:24

## 2024-08-16 ASSESSMENT — PAIN SCALES - GENERAL
PAINLEVEL_OUTOF10: 0
PAINLEVEL_OUTOF10: 3
PAINLEVEL_OUTOF10: 3
PAINLEVEL_OUTOF10: 6

## 2024-08-16 ASSESSMENT — PAIN DESCRIPTION - PAIN TYPE: TYPE: ACUTE PAIN

## 2024-08-16 ASSESSMENT — PAIN DESCRIPTION - DESCRIPTORS
DESCRIPTORS: SORE;ACHING
DESCRIPTORS: ACHING

## 2024-08-16 ASSESSMENT — PAIN - FUNCTIONAL ASSESSMENT: PAIN_FUNCTIONAL_ASSESSMENT: ACTIVITIES ARE NOT PREVENTED

## 2024-08-16 ASSESSMENT — PAIN DESCRIPTION - LOCATION
LOCATION: SHOULDER
LOCATION: HEAD
LOCATION: ABDOMEN

## 2024-08-16 ASSESSMENT — PAIN DESCRIPTION - ORIENTATION: ORIENTATION: LEFT

## 2024-08-16 ASSESSMENT — PAIN DESCRIPTION - FREQUENCY: FREQUENCY: INTERMITTENT

## 2024-08-16 NOTE — CARE COORDINATION
Care Management Initial Assessment       RUR: 34% High Risk  Readmission? No  1st IM letter given? Yes   1st  letter given: N/A    Initial Assessment: Chart reviewed. CM met with pt and pts spouse at the bedside to introduce self and role. Verified contact information and demographics. Pt resides with spouse in a one level home with ramp entrance. Pt PCP is in need of new PCP. Preferred pharmacy is [facility] on [address] Pt has no hx needing IPR/SNF services. Pt currently open with [facility]. Pt recently needs assistance with ADL's and has a walker, wheelchair, and cane. Pt also has home O2 however, could not remember DME company. PT on 2 liters with a concentraor and portable tanks. Pt is not an active . ?Pts has no transportation and will need BLS transportation for discharge.?CM gave PT/OT recs for rehab. Pt and spouse declined rehab and would like to contunue with HH. She?states there is no concerns for her to return home. CM will continue to follow.    Plan A: Home with follow up appts and HH  Full assessment below:          08/16/24 2226   Service Assessment   Patient Orientation Person;Alert and Oriented;Place;Situation;Self   Cognition Alert   History Provided By Patient   Primary Caregiver Spouse   Support Systems Spouse/Significant Other   Patient's Healthcare Decision Maker is: Legal Next of Kin   PCP Verified by CM No   Prior Functional Level Assistance with the following:;Mobility;Shopping;Housework;Cooking   Current Functional Level Mobility;Assistance with the following:;Cooking;Housework;Shopping   Can patient return to prior living arrangement Yes   Ability to make needs known: Good   Family able to assist with home care needs: Yes   Would you like for me to discuss the discharge plan with any other family members/significant others, and if so, who? Yes  (Wesley Oakes (Spouse)  [phone] (Mobile))   Social/Functional History   Lives With Spouse   Type of Home House   Home Layout

## 2024-08-16 NOTE — CONSULTS
normal  []Lungs clear  [] Respiratory distress  []Tachypnea  [] Other:  Abdomen: [] Soft/non-tender  [] Normal appearance  [] Distended  [] Ascites  [] Other:  Neurological: [] Normal speech  [] Normal sensation  []Deficits present:  Extremity: [] Normal skin color/temp  [] Clubbing/cyanosis  [] No edema  [] Other:    Wt Readings from Last 15 Encounters:   08/15/24 104.9 kg (231 lb 4.2 oz)   08/11/24 108.9 kg (240 lb)   08/01/24 108.9 kg (240 lb)   07/15/24 108.9 kg (240 lb)   07/11/24 108.9 kg (240 lb)   06/25/24 108.4 kg (239 lb)   06/06/24 108.9 kg (240 lb)   05/12/24 108.9 kg (240 lb)   05/10/24 108.9 kg (240 lb)   05/04/24 109 kg (240 lb 4.8 oz)   04/03/24 102.7 kg (226 lb 6.6 oz)   03/22/24 102.5 kg (226 lb)   01/22/24 105.4 kg (232 lb 5.8 oz)   12/22/23 123.4 kg (272 lb 0.8 oz)   12/11/23 108.6 kg (239 lb 6.7 oz)        Current Diet: ADULT DIET; Regular; 4 carb choices (60 gm/meal); Low Fat/Low Chol/High Fiber/KAREEM       PSYCHOSOCIAL/SPIRITUAL SCREENING:   Palliative IDT has assessed this patient for cultural preferences / practices and a referral made as appropriate to needs (Cultural Services, Patient Advocacy, Ethics, etc.)    Spiritual Affiliation: Pentecostalism    Any spiritual / Worship concerns:  [] Yes /  [x] No   If \"Yes\" to discuss with pastoral care during IDT     Does caregiver feel burdened by caring for their loved one:   [] Yes /  [x] No /  [] No Caregiver Present/Available [] No Caregiver [] Pt Lives at Facility  If \"Yes\" to discuss with social work during IDT    Anticipatory grief assessment:   [x] Normal  / [] Maladaptive     If \"Maladaptive\" to discuss with social work during IDT    ESAS Anxiety:      ESAS Depression:          LAB AND IMAGING FINDINGS:   Objective data reviewed:  labs, images, records, medication use, vitals, and chart     FINAL COMMENTS   Thank you for allowing Palliative Medicine to participate in the care of Marilee Oakes.    Only check if applicable and billing time

## 2024-08-16 NOTE — WOUND CARE
Wound Care consult for the surgical wounds on the left ankle and foot:  Chart reviewed and patient assessed. Pt. Had some kind of orthopedic surgery  on 8/1/24 by Dr. Nae Chan of [facility]. She appears to be healing well from the fractures.  She is a poorly controlled diabetic.    Assessment: Generally sleepy but does wake up to moan some. No c/o pain with this exam and the wound care.    Steri-strips on closed, healed wounds:   All Steri-stips were loose and removed and the wounds are closed, not draining. Cleansed with wound cleanser and left VITO.     Wound cleansed with wound cleanser spray and then dressed with the xeroform gauze (same as what was already on it from surgery).  Covered with ABD and wrapped with ismael. Wrapped also with an ACE bandage. Taped and dated the dressing.  Heel now floated.     Heel / plantar wound cleansed with wound cleanser and then dressed with a Xeroform and ABD. Wrapped with the wound above and then the ACE.   Heels floated and should stay that way.   Plan: Wound care ordered for every other day unless Dr. Chan orders something else.   Pressure injury prevention with turning and floating heels.   Verónica Collins, RN, BSN, CWON

## 2024-08-17 LAB
ANION GAP SERPL CALC-SCNC: 5 MMOL/L (ref 5–15)
BACTERIA SPEC CULT: ABNORMAL
BASOPHILS # BLD: 0 K/UL (ref 0–0.1)
BASOPHILS NFR BLD: 0 % (ref 0–1)
BUN SERPL-MCNC: 18 MG/DL (ref 6–20)
BUN/CREAT SERPL: 12 (ref 12–20)
CALCIUM SERPL-MCNC: 9.3 MG/DL (ref 8.5–10.1)
CC UR VC: ABNORMAL
CHLORIDE SERPL-SCNC: 110 MMOL/L (ref 97–108)
CO2 SERPL-SCNC: 31 MMOL/L (ref 21–32)
CREAT SERPL-MCNC: 1.54 MG/DL (ref 0.55–1.02)
DIFFERENTIAL METHOD BLD: ABNORMAL
EOSINOPHIL # BLD: 0.5 K/UL (ref 0–0.4)
EOSINOPHIL NFR BLD: 9 % (ref 0–7)
ERYTHROCYTE [DISTWIDTH] IN BLOOD BY AUTOMATED COUNT: 17.5 % (ref 11.5–14.5)
GLUCOSE SERPL-MCNC: 136 MG/DL (ref 65–100)
HCT VFR BLD AUTO: 34.8 % (ref 35–47)
HGB BLD-MCNC: 9.9 G/DL (ref 11.5–16)
IMM GRANULOCYTES # BLD AUTO: 0.1 K/UL (ref 0–0.04)
IMM GRANULOCYTES NFR BLD AUTO: 1 % (ref 0–0.5)
LYMPHOCYTES # BLD: 0.7 K/UL (ref 0.8–3.5)
LYMPHOCYTES NFR BLD: 13 % (ref 12–49)
MAGNESIUM SERPL-MCNC: 1.8 MG/DL (ref 1.6–2.4)
MCH RBC QN AUTO: 25.1 PG (ref 26–34)
MCHC RBC AUTO-ENTMCNC: 28.4 G/DL (ref 30–36.5)
MCV RBC AUTO: 88.1 FL (ref 80–99)
MONOCYTES # BLD: 0.4 K/UL (ref 0–1)
MONOCYTES NFR BLD: 8 % (ref 5–13)
NEUTS SEG # BLD: 3.3 K/UL (ref 1.8–8)
NEUTS SEG NFR BLD: 69 % (ref 32–75)
NRBC # BLD: 0 K/UL (ref 0–0.01)
NRBC BLD-RTO: 0 PER 100 WBC
PLATELET # BLD AUTO: 164 K/UL (ref 150–400)
PMV BLD AUTO: 11.6 FL (ref 8.9–12.9)
POTASSIUM SERPL-SCNC: 3.3 MMOL/L (ref 3.5–5.1)
RBC # BLD AUTO: 3.95 M/UL (ref 3.8–5.2)
RBC MORPH BLD: ABNORMAL
SERVICE CMNT-IMP: ABNORMAL
SODIUM SERPL-SCNC: 146 MMOL/L (ref 136–145)
WBC # BLD AUTO: 5 K/UL (ref 3.6–11)

## 2024-08-17 PROCEDURE — 6370000000 HC RX 637 (ALT 250 FOR IP): Performed by: NURSE PRACTITIONER

## 2024-08-17 PROCEDURE — 83735 ASSAY OF MAGNESIUM: CPT

## 2024-08-17 PROCEDURE — 6360000002 HC RX W HCPCS: Performed by: STUDENT IN AN ORGANIZED HEALTH CARE EDUCATION/TRAINING PROGRAM

## 2024-08-17 PROCEDURE — 94640 AIRWAY INHALATION TREATMENT: CPT

## 2024-08-17 PROCEDURE — 2580000003 HC RX 258: Performed by: STUDENT IN AN ORGANIZED HEALTH CARE EDUCATION/TRAINING PROGRAM

## 2024-08-17 PROCEDURE — 36415 COLL VENOUS BLD VENIPUNCTURE: CPT

## 2024-08-17 PROCEDURE — 1100000003 HC PRIVATE W/ TELEMETRY

## 2024-08-17 PROCEDURE — 6370000000 HC RX 637 (ALT 250 FOR IP): Performed by: STUDENT IN AN ORGANIZED HEALTH CARE EDUCATION/TRAINING PROGRAM

## 2024-08-17 PROCEDURE — 94761 N-INVAS EAR/PLS OXIMETRY MLT: CPT

## 2024-08-17 PROCEDURE — 2700000000 HC OXYGEN THERAPY PER DAY

## 2024-08-17 PROCEDURE — 80048 BASIC METABOLIC PNL TOTAL CA: CPT

## 2024-08-17 PROCEDURE — 85025 COMPLETE CBC W/AUTO DIFF WBC: CPT

## 2024-08-17 RX ORDER — HYDROXYZINE HYDROCHLORIDE 10 MG/1
10 TABLET, FILM COATED ORAL ONCE
Status: COMPLETED | OUTPATIENT
Start: 2024-08-17 | End: 2024-08-17

## 2024-08-17 RX ADMIN — CARVEDILOL 25 MG: 12.5 TABLET, FILM COATED ORAL at 17:38

## 2024-08-17 RX ADMIN — ISOSORBIDE MONONITRATE 60 MG: 30 TABLET, EXTENDED RELEASE ORAL at 08:36

## 2024-08-17 RX ADMIN — ARFORMOTEROL TARTRATE: 15 SOLUTION RESPIRATORY (INHALATION) at 07:29

## 2024-08-17 RX ADMIN — CARVEDILOL 25 MG: 12.5 TABLET, FILM COATED ORAL at 08:36

## 2024-08-17 RX ADMIN — IPRATROPIUM BROMIDE AND ALBUTEROL SULFATE 1 DOSE: .5; 3 SOLUTION RESPIRATORY (INHALATION) at 05:36

## 2024-08-17 RX ADMIN — OXYCODONE HYDROCHLORIDE AND ACETAMINOPHEN 1 TABLET: 5; 325 TABLET ORAL at 02:00

## 2024-08-17 RX ADMIN — SODIUM CHLORIDE, PRESERVATIVE FREE 10 ML: 5 INJECTION INTRAVENOUS at 20:26

## 2024-08-17 RX ADMIN — CEFTRIAXONE 1000 MG: 1 INJECTION, POWDER, FOR SOLUTION INTRAMUSCULAR; INTRAVENOUS at 00:47

## 2024-08-17 RX ADMIN — PANTOPRAZOLE SODIUM 40 MG: 40 TABLET, DELAYED RELEASE ORAL at 05:06

## 2024-08-17 RX ADMIN — ACETAMINOPHEN 650 MG: 325 TABLET ORAL at 13:34

## 2024-08-17 RX ADMIN — AMLODIPINE BESYLATE 5 MG: 5 TABLET ORAL at 08:37

## 2024-08-17 RX ADMIN — ENOXAPARIN SODIUM 30 MG: 100 INJECTION SUBCUTANEOUS at 20:25

## 2024-08-17 RX ADMIN — FERROUS SULFATE TAB 325 MG (65 MG ELEMENTAL FE) 325 MG: 325 (65 FE) TAB at 08:36

## 2024-08-17 RX ADMIN — OXYCODONE HYDROCHLORIDE AND ACETAMINOPHEN 1 TABLET: 5; 325 TABLET ORAL at 22:25

## 2024-08-17 RX ADMIN — ASPIRIN 81 MG: 81 TABLET, CHEWABLE ORAL at 08:36

## 2024-08-17 RX ADMIN — MICONAZOLE NITRATE: 20 CREAM TOPICAL at 20:25

## 2024-08-17 RX ADMIN — OXYCODONE HYDROCHLORIDE AND ACETAMINOPHEN 1 TABLET: 5; 325 TABLET ORAL at 18:25

## 2024-08-17 RX ADMIN — MICONAZOLE NITRATE: 20 CREAM TOPICAL at 08:37

## 2024-08-17 RX ADMIN — ENOXAPARIN SODIUM 30 MG: 100 INJECTION SUBCUTANEOUS at 08:37

## 2024-08-17 RX ADMIN — HYDROXYZINE HYDROCHLORIDE 10 MG: 10 TABLET ORAL at 04:08

## 2024-08-17 RX ADMIN — MELATONIN 3 MG: at 22:06

## 2024-08-17 RX ADMIN — SODIUM CHLORIDE, PRESERVATIVE FREE 10 ML: 5 INJECTION INTRAVENOUS at 08:38

## 2024-08-17 RX ADMIN — ARFORMOTEROL TARTRATE: 15 SOLUTION RESPIRATORY (INHALATION) at 20:41

## 2024-08-17 RX ADMIN — RENO CAPS 1 MG: 100; 1.5; 1.7; 20; 10; 1; 150; 5; 6 CAPSULE ORAL at 08:36

## 2024-08-17 RX ADMIN — ONDANSETRON 4 MG: 2 INJECTION INTRAMUSCULAR; INTRAVENOUS at 04:59

## 2024-08-17 RX ADMIN — MELATONIN 3 MG: at 20:25

## 2024-08-17 ASSESSMENT — PAIN DESCRIPTION - LOCATION
LOCATION: NECK
LOCATION: NECK
LOCATION: ANKLE;SHOULDER
LOCATION: BACK

## 2024-08-17 ASSESSMENT — PAIN SCALES - GENERAL
PAINLEVEL_OUTOF10: 4
PAINLEVEL_OUTOF10: 10
PAINLEVEL_OUTOF10: 0
PAINLEVEL_OUTOF10: 3
PAINLEVEL_OUTOF10: 5

## 2024-08-17 ASSESSMENT — PAIN DESCRIPTION - DESCRIPTORS
DESCRIPTORS: ACHING

## 2024-08-17 ASSESSMENT — PAIN DESCRIPTION - ORIENTATION
ORIENTATION: LOWER
ORIENTATION: LEFT

## 2024-08-18 LAB
25(OH)D3 SERPL-MCNC: 83.3 NG/ML (ref 30–100)
ANION GAP SERPL CALC-SCNC: 3 MMOL/L (ref 5–15)
BASOPHILS # BLD: 0.1 K/UL (ref 0–0.1)
BASOPHILS NFR BLD: 1 % (ref 0–1)
BUN SERPL-MCNC: 17 MG/DL (ref 6–20)
BUN/CREAT SERPL: 10 (ref 12–20)
CALCIUM SERPL-MCNC: 9.4 MG/DL (ref 8.5–10.1)
CHLORIDE SERPL-SCNC: 113 MMOL/L (ref 97–108)
CO2 SERPL-SCNC: 31 MMOL/L (ref 21–32)
CREAT SERPL-MCNC: 1.72 MG/DL (ref 0.55–1.02)
DIFFERENTIAL METHOD BLD: ABNORMAL
EOSINOPHIL # BLD: 0.3 K/UL (ref 0–0.4)
EOSINOPHIL NFR BLD: 6 % (ref 0–7)
ERYTHROCYTE [DISTWIDTH] IN BLOOD BY AUTOMATED COUNT: 17.8 % (ref 11.5–14.5)
FOLATE SERPL-MCNC: 23.4 NG/ML (ref 5–21)
GLUCOSE SERPL-MCNC: 136 MG/DL (ref 65–100)
HCT VFR BLD AUTO: 35.5 % (ref 35–47)
HGB BLD-MCNC: 9.9 G/DL (ref 11.5–16)
IMM GRANULOCYTES # BLD AUTO: 0.1 K/UL (ref 0–0.04)
IMM GRANULOCYTES NFR BLD AUTO: 1 % (ref 0–0.5)
LYMPHOCYTES # BLD: 0.7 K/UL (ref 0.8–3.5)
LYMPHOCYTES NFR BLD: 12 % (ref 12–49)
MAGNESIUM SERPL-MCNC: 1.8 MG/DL (ref 1.6–2.4)
MCH RBC QN AUTO: 25.2 PG (ref 26–34)
MCHC RBC AUTO-ENTMCNC: 27.9 G/DL (ref 30–36.5)
MCV RBC AUTO: 90.3 FL (ref 80–99)
MONOCYTES # BLD: 0.4 K/UL (ref 0–1)
MONOCYTES NFR BLD: 7 % (ref 5–13)
NEUTS SEG # BLD: 4.1 K/UL (ref 1.8–8)
NEUTS SEG NFR BLD: 73 % (ref 32–75)
NRBC # BLD: 0 K/UL (ref 0–0.01)
NRBC BLD-RTO: 0 PER 100 WBC
PLATELET # BLD AUTO: 178 K/UL (ref 150–400)
PMV BLD AUTO: 12.1 FL (ref 8.9–12.9)
POTASSIUM SERPL-SCNC: 3.5 MMOL/L (ref 3.5–5.1)
RBC # BLD AUTO: 3.93 M/UL (ref 3.8–5.2)
RBC MORPH BLD: ABNORMAL
SODIUM SERPL-SCNC: 147 MMOL/L (ref 136–145)
VIT B12 SERPL-MCNC: 553 PG/ML (ref 193–986)
WBC # BLD AUTO: 5.7 K/UL (ref 3.6–11)

## 2024-08-18 PROCEDURE — 6370000000 HC RX 637 (ALT 250 FOR IP): Performed by: STUDENT IN AN ORGANIZED HEALTH CARE EDUCATION/TRAINING PROGRAM

## 2024-08-18 PROCEDURE — 82306 VITAMIN D 25 HYDROXY: CPT

## 2024-08-18 PROCEDURE — 6360000002 HC RX W HCPCS: Performed by: STUDENT IN AN ORGANIZED HEALTH CARE EDUCATION/TRAINING PROGRAM

## 2024-08-18 PROCEDURE — 6370000000 HC RX 637 (ALT 250 FOR IP): Performed by: INTERNAL MEDICINE

## 2024-08-18 PROCEDURE — 94640 AIRWAY INHALATION TREATMENT: CPT

## 2024-08-18 PROCEDURE — 36415 COLL VENOUS BLD VENIPUNCTURE: CPT

## 2024-08-18 PROCEDURE — 80048 BASIC METABOLIC PNL TOTAL CA: CPT

## 2024-08-18 PROCEDURE — 82746 ASSAY OF FOLIC ACID SERUM: CPT

## 2024-08-18 PROCEDURE — 82607 VITAMIN B-12: CPT

## 2024-08-18 PROCEDURE — 6370000000 HC RX 637 (ALT 250 FOR IP): Performed by: NURSE PRACTITIONER

## 2024-08-18 PROCEDURE — 85025 COMPLETE CBC W/AUTO DIFF WBC: CPT

## 2024-08-18 PROCEDURE — 2580000003 HC RX 258: Performed by: STUDENT IN AN ORGANIZED HEALTH CARE EDUCATION/TRAINING PROGRAM

## 2024-08-18 PROCEDURE — 1100000003 HC PRIVATE W/ TELEMETRY

## 2024-08-18 PROCEDURE — 6360000002 HC RX W HCPCS: Performed by: INTERNAL MEDICINE

## 2024-08-18 PROCEDURE — 83735 ASSAY OF MAGNESIUM: CPT

## 2024-08-18 PROCEDURE — 94761 N-INVAS EAR/PLS OXIMETRY MLT: CPT

## 2024-08-18 PROCEDURE — 2700000000 HC OXYGEN THERAPY PER DAY

## 2024-08-18 RX ORDER — HALOPERIDOL 5 MG/ML
1 INJECTION INTRAMUSCULAR EVERY 6 HOURS PRN
Status: DISCONTINUED | OUTPATIENT
Start: 2024-08-18 | End: 2024-08-19 | Stop reason: HOSPADM

## 2024-08-18 RX ORDER — LEVOFLOXACIN 750 MG/1
750 TABLET, FILM COATED ORAL
Status: DISCONTINUED | OUTPATIENT
Start: 2024-08-18 | End: 2024-08-19 | Stop reason: HOSPADM

## 2024-08-18 RX ORDER — HALOPERIDOL 5 MG/ML
1 INJECTION INTRAMUSCULAR EVERY 6 HOURS PRN
Status: DISCONTINUED | OUTPATIENT
Start: 2024-08-18 | End: 2024-08-18

## 2024-08-18 RX ORDER — LORAZEPAM 2 MG/ML
0.5 INJECTION INTRAMUSCULAR ONCE
Status: COMPLETED | OUTPATIENT
Start: 2024-08-18 | End: 2024-08-18

## 2024-08-18 RX ADMIN — ISOSORBIDE MONONITRATE 60 MG: 30 TABLET, EXTENDED RELEASE ORAL at 09:41

## 2024-08-18 RX ADMIN — MICONAZOLE NITRATE: 20 CREAM TOPICAL at 20:21

## 2024-08-18 RX ADMIN — LORAZEPAM 0.5 MG: 2 INJECTION INTRAMUSCULAR; INTRAVENOUS at 02:59

## 2024-08-18 RX ADMIN — LEVOFLOXACIN 750 MG: 750 TABLET, FILM COATED ORAL at 09:41

## 2024-08-18 RX ADMIN — OXYCODONE HYDROCHLORIDE AND ACETAMINOPHEN 1 TABLET: 5; 325 TABLET ORAL at 20:19

## 2024-08-18 RX ADMIN — ONDANSETRON 4 MG: 4 TABLET, ORALLY DISINTEGRATING ORAL at 22:40

## 2024-08-18 RX ADMIN — AMLODIPINE BESYLATE 5 MG: 5 TABLET ORAL at 09:41

## 2024-08-18 RX ADMIN — CEFTRIAXONE 1000 MG: 1 INJECTION, POWDER, FOR SOLUTION INTRAMUSCULAR; INTRAVENOUS at 01:01

## 2024-08-18 RX ADMIN — OXYCODONE HYDROCHLORIDE AND ACETAMINOPHEN 1 TABLET: 5; 325 TABLET ORAL at 02:32

## 2024-08-18 RX ADMIN — ACETAMINOPHEN 650 MG: 325 TABLET ORAL at 00:55

## 2024-08-18 RX ADMIN — ASPIRIN 81 MG: 81 TABLET, CHEWABLE ORAL at 09:41

## 2024-08-18 RX ADMIN — RENO CAPS 1 MG: 100; 1.5; 1.7; 20; 10; 1; 150; 5; 6 CAPSULE ORAL at 09:41

## 2024-08-18 RX ADMIN — CARVEDILOL 25 MG: 12.5 TABLET, FILM COATED ORAL at 09:41

## 2024-08-18 RX ADMIN — HALOPERIDOL LACTATE 1 MG: 5 INJECTION, SOLUTION INTRAMUSCULAR at 10:32

## 2024-08-18 RX ADMIN — ENOXAPARIN SODIUM 30 MG: 100 INJECTION SUBCUTANEOUS at 09:41

## 2024-08-18 RX ADMIN — MELATONIN 3 MG: at 23:58

## 2024-08-18 RX ADMIN — ENOXAPARIN SODIUM 30 MG: 100 INJECTION SUBCUTANEOUS at 20:20

## 2024-08-18 RX ADMIN — ARFORMOTEROL TARTRATE: 15 SOLUTION RESPIRATORY (INHALATION) at 21:00

## 2024-08-18 RX ADMIN — ACETAMINOPHEN 650 MG: 325 TABLET ORAL at 22:53

## 2024-08-18 RX ADMIN — FERROUS SULFATE TAB 325 MG (65 MG ELEMENTAL FE) 325 MG: 325 (65 FE) TAB at 09:41

## 2024-08-18 RX ADMIN — ARFORMOTEROL TARTRATE: 15 SOLUTION RESPIRATORY (INHALATION) at 07:32

## 2024-08-18 RX ADMIN — MELATONIN 3 MG: at 20:19

## 2024-08-18 ASSESSMENT — PAIN DESCRIPTION - LOCATION
LOCATION: HEAD
LOCATION: ABDOMEN
LOCATION: GENERALIZED
LOCATION: SHOULDER

## 2024-08-18 ASSESSMENT — PAIN SCALES - GENERAL
PAINLEVEL_OUTOF10: 10
PAINLEVEL_OUTOF10: 7
PAINLEVEL_OUTOF10: 6
PAINLEVEL_OUTOF10: 5
PAINLEVEL_OUTOF10: 5
PAINLEVEL_OUTOF10: 10

## 2024-08-18 ASSESSMENT — PAIN DESCRIPTION - DESCRIPTORS
DESCRIPTORS: ACHING
DESCRIPTORS: ACHING

## 2024-08-18 ASSESSMENT — PAIN DESCRIPTION - ORIENTATION: ORIENTATION: RIGHT

## 2024-08-18 NOTE — ED PROVIDER NOTES
by mouth daily (with breakfast)     fluticasone-salmeterol 250-50 MCG/ACT Aepb diskus inhaler  Commonly known as: ADVAIR  Inhale 1 puff into the lungs in the morning and 1 puff in the evening.     isosorbide mononitrate 60 MG extended release tablet  Commonly known as: IMDUR  Take 1 tablet by mouth daily     Lidocaine (Anorectal) 5 % Crea     loperamide 2 MG capsule  Commonly known as: IMODIUM  Take 1 capsule by mouth 4 times daily as needed for Diarrhea     magnesium oxide 400 MG tablet  Commonly known as: MAG-OX  Take 1 tablet by mouth 2 times daily     Melatonin 5 MG Caps  Take 1 tablet by mouth at bedtime     naloxone 4 MG/0.1ML Liqd nasal spray  Commonly known as: Narcan  1 spray by Nasal route as needed for Opioid Reversal     nystatin [id number] UNIT/GM cream  Commonly known as: MYCOSTATIN  Apply topically 2 times daily.     omeprazole 40 MG delayed release capsule  Commonly known as: PRILOSEC  Take 1 capsule by mouth daily     polyethylene glycol 17 GM/SCOOP powder  Commonly known as: GLYCOLAX  Take 17 g by mouth daily     pregabalin 150 MG capsule  Commonly known as: LYRICA  Take 1 capsule by mouth daily for 30 days. Max Daily Amount: 150 mg     Renal Multivitamin Formula Tabs  Take 1 tablet by mouth daily     SIMETHICONE PO     sodium chloride 0.65 % nasal spray  Commonly known as: OCEAN  2 sprays by Nasal route every 8 hours as needed for Congestion     triamcinolone 0.1 % cream  Commonly known as: KENALOG  Apply topically 2 times daily.     vitamin D 25 MCG (1000 UT) Caps  Take 1 capsule by mouth daily               Where to Get Your Medications        These medications were sent to [facility] - [place], [place] - [address] - P [phone] - F [phone]  [address], [facility] [address]      Phone: [phone]   ondansetron 4 MG disintegrating tablet           DISCONTINUED MEDICATIONS:  Discharge Medication List as of 8/11/2024 10:59 PM          I am the Primary

## 2024-08-19 ENCOUNTER — HOSPITAL ENCOUNTER (INPATIENT)
Facility: HOSPITAL | Age: 74
LOS: 6 days | Discharge: HOME HEALTH | DRG: 291 | End: 2024-08-26
Attending: EMERGENCY MEDICINE | Admitting: INTERNAL MEDICINE
Payer: MEDICARE

## 2024-08-19 ENCOUNTER — APPOINTMENT (OUTPATIENT)
Facility: HOSPITAL | Age: 74
DRG: 291 | End: 2024-08-19
Payer: MEDICARE

## 2024-08-19 VITALS
WEIGHT: 223.77 LBS | HEART RATE: 80 BPM | DIASTOLIC BLOOD PRESSURE: 112 MMHG | RESPIRATION RATE: 18 BRPM | TEMPERATURE: 98.7 F | SYSTOLIC BLOOD PRESSURE: 164 MMHG | BODY MASS INDEX: 33.14 KG/M2 | HEIGHT: 69 IN | OXYGEN SATURATION: 96 %

## 2024-08-19 DIAGNOSIS — I50.33 ACUTE ON CHRONIC DIASTOLIC HEART FAILURE (HCC): ICD-10-CM

## 2024-08-19 DIAGNOSIS — I50.9 ACUTE ON CHRONIC CONGESTIVE HEART FAILURE, UNSPECIFIED HEART FAILURE TYPE (HCC): Primary | ICD-10-CM

## 2024-08-19 DIAGNOSIS — E11.42 DIABETIC POLYNEUROPATHY ASSOCIATED WITH TYPE 2 DIABETES MELLITUS (HCC): ICD-10-CM

## 2024-08-19 LAB
ALBUMIN SERPL-MCNC: 3.1 G/DL (ref 3.5–5)
ALBUMIN/GLOB SERPL: 0.9 (ref 1.1–2.2)
ALP SERPL-CCNC: 168 U/L (ref 45–117)
ALT SERPL-CCNC: 14 U/L (ref 12–78)
ANION GAP SERPL CALC-SCNC: 4 MMOL/L (ref 5–15)
ANION GAP SERPL CALC-SCNC: 7 MMOL/L (ref 5–15)
AST SERPL-CCNC: 18 U/L (ref 15–37)
BASOPHILS # BLD: 0 K/UL (ref 0–0.1)
BASOPHILS NFR BLD: 0 % (ref 0–1)
BILIRUB SERPL-MCNC: 0.6 MG/DL (ref 0.2–1)
BUN SERPL-MCNC: 15 MG/DL (ref 6–20)
BUN SERPL-MCNC: 16 MG/DL (ref 6–20)
BUN/CREAT SERPL: 10 (ref 12–20)
BUN/CREAT SERPL: 9 (ref 12–20)
CALCIUM SERPL-MCNC: 9.5 MG/DL (ref 8.5–10.1)
CALCIUM SERPL-MCNC: 9.7 MG/DL (ref 8.5–10.1)
CHLORIDE SERPL-SCNC: 110 MMOL/L (ref 97–108)
CHLORIDE SERPL-SCNC: 110 MMOL/L (ref 97–108)
CO2 SERPL-SCNC: 29 MMOL/L (ref 21–32)
CO2 SERPL-SCNC: 32 MMOL/L (ref 21–32)
CREAT SERPL-MCNC: 1.53 MG/DL (ref 0.55–1.02)
CREAT SERPL-MCNC: 1.74 MG/DL (ref 0.55–1.02)
DIFFERENTIAL METHOD BLD: ABNORMAL
EOSINOPHIL # BLD: 0.5 K/UL (ref 0–0.4)
EOSINOPHIL NFR BLD: 6 % (ref 0–7)
ERYTHROCYTE [DISTWIDTH] IN BLOOD BY AUTOMATED COUNT: 19.2 % (ref 11.5–14.5)
GLOBULIN SER CALC-MCNC: 3.6 G/DL (ref 2–4)
GLUCOSE SERPL-MCNC: 137 MG/DL (ref 65–100)
GLUCOSE SERPL-MCNC: 138 MG/DL (ref 65–100)
HCT VFR BLD AUTO: 38 % (ref 35–47)
HGB BLD-MCNC: 10.7 G/DL (ref 11.5–16)
IMM GRANULOCYTES # BLD AUTO: 0.1 K/UL (ref 0–0.04)
IMM GRANULOCYTES NFR BLD AUTO: 1 % (ref 0–0.5)
LYMPHOCYTES # BLD: 0.7 K/UL (ref 0.8–3.5)
LYMPHOCYTES NFR BLD: 9 % (ref 12–49)
MAGNESIUM SERPL-MCNC: 1.9 MG/DL (ref 1.6–2.4)
MCH RBC QN AUTO: 25.3 PG (ref 26–34)
MCHC RBC AUTO-ENTMCNC: 28.2 G/DL (ref 30–36.5)
MCV RBC AUTO: 89.8 FL (ref 80–99)
MONOCYTES # BLD: 0.7 K/UL (ref 0–1)
MONOCYTES NFR BLD: 8 % (ref 5–13)
NEUTS SEG # BLD: 6.3 K/UL (ref 1.8–8)
NEUTS SEG NFR BLD: 76 % (ref 32–75)
NRBC # BLD: 0 K/UL (ref 0–0.01)
NRBC BLD-RTO: 0 PER 100 WBC
PLATELET # BLD AUTO: 221 K/UL (ref 150–400)
PMV BLD AUTO: 11.4 FL (ref 8.9–12.9)
POTASSIUM SERPL-SCNC: 3.2 MMOL/L (ref 3.5–5.1)
POTASSIUM SERPL-SCNC: 3.5 MMOL/L (ref 3.5–5.1)
PROT SERPL-MCNC: 6.7 G/DL (ref 6.4–8.2)
RBC # BLD AUTO: 4.23 M/UL (ref 3.8–5.2)
RBC MORPH BLD: ABNORMAL
SODIUM SERPL-SCNC: 146 MMOL/L (ref 136–145)
SODIUM SERPL-SCNC: 146 MMOL/L (ref 136–145)
WBC # BLD AUTO: 8.3 K/UL (ref 3.6–11)

## 2024-08-19 PROCEDURE — 6360000004 HC RX CONTRAST MEDICATION: Performed by: RADIOLOGY

## 2024-08-19 PROCEDURE — 6360000002 HC RX W HCPCS: Performed by: STUDENT IN AN ORGANIZED HEALTH CARE EDUCATION/TRAINING PROGRAM

## 2024-08-19 PROCEDURE — 94761 N-INVAS EAR/PLS OXIMETRY MLT: CPT

## 2024-08-19 PROCEDURE — 80053 COMPREHEN METABOLIC PANEL: CPT

## 2024-08-19 PROCEDURE — 36415 COLL VENOUS BLD VENIPUNCTURE: CPT

## 2024-08-19 PROCEDURE — 6370000000 HC RX 637 (ALT 250 FOR IP): Performed by: NURSE PRACTITIONER

## 2024-08-19 PROCEDURE — 2580000003 HC RX 258: Performed by: STUDENT IN AN ORGANIZED HEALTH CARE EDUCATION/TRAINING PROGRAM

## 2024-08-19 PROCEDURE — 94640 AIRWAY INHALATION TREATMENT: CPT

## 2024-08-19 PROCEDURE — 6370000000 HC RX 637 (ALT 250 FOR IP): Performed by: STUDENT IN AN ORGANIZED HEALTH CARE EDUCATION/TRAINING PROGRAM

## 2024-08-19 PROCEDURE — 83880 ASSAY OF NATRIURETIC PEPTIDE: CPT

## 2024-08-19 PROCEDURE — 83735 ASSAY OF MAGNESIUM: CPT

## 2024-08-19 PROCEDURE — 99285 EMERGENCY DEPT VISIT HI MDM: CPT

## 2024-08-19 PROCEDURE — 2700000000 HC OXYGEN THERAPY PER DAY

## 2024-08-19 PROCEDURE — 97535 SELF CARE MNGMENT TRAINING: CPT | Performed by: OCCUPATIONAL THERAPIST

## 2024-08-19 PROCEDURE — 97110 THERAPEUTIC EXERCISES: CPT

## 2024-08-19 PROCEDURE — 85025 COMPLETE CBC W/AUTO DIFF WBC: CPT

## 2024-08-19 PROCEDURE — 97530 THERAPEUTIC ACTIVITIES: CPT

## 2024-08-19 PROCEDURE — 74177 CT ABD & PELVIS W/CONTRAST: CPT

## 2024-08-19 PROCEDURE — 84484 ASSAY OF TROPONIN QUANT: CPT

## 2024-08-19 RX ORDER — LIDOCAINE 4 G/G
1 PATCH TOPICAL DAILY
Status: DISCONTINUED | OUTPATIENT
Start: 2024-08-19 | End: 2024-08-19 | Stop reason: HOSPADM

## 2024-08-19 RX ORDER — BUMETANIDE 1 MG/1
1 TABLET ORAL DAILY
Qty: 30 TABLET | Refills: 0 | Status: ON HOLD
Start: 2024-08-19

## 2024-08-19 RX ORDER — IOPAMIDOL 755 MG/ML
100 INJECTION, SOLUTION INTRAVASCULAR
Status: COMPLETED | OUTPATIENT
Start: 2024-08-19 | End: 2024-08-19

## 2024-08-19 RX ORDER — LEVOFLOXACIN 750 MG/1
750 TABLET, FILM COATED ORAL
Qty: 3 TABLET | Refills: 0 | Status: ON HOLD | OUTPATIENT
Start: 2024-08-20 | End: 2024-08-25

## 2024-08-19 RX ORDER — QUETIAPINE FUMARATE 25 MG/1
25 TABLET, FILM COATED ORAL NIGHTLY
Status: DISCONTINUED | OUTPATIENT
Start: 2024-08-19 | End: 2024-08-19 | Stop reason: HOSPADM

## 2024-08-19 RX ADMIN — SODIUM CHLORIDE, PRESERVATIVE FREE 10 ML: 5 INJECTION INTRAVENOUS at 09:53

## 2024-08-19 RX ADMIN — CARVEDILOL 25 MG: 12.5 TABLET, FILM COATED ORAL at 09:51

## 2024-08-19 RX ADMIN — FERROUS SULFATE TAB 325 MG (65 MG ELEMENTAL FE) 325 MG: 325 (65 FE) TAB at 09:51

## 2024-08-19 RX ADMIN — ACETAMINOPHEN 650 MG: 325 TABLET ORAL at 12:40

## 2024-08-19 RX ADMIN — ASPIRIN 81 MG: 81 TABLET, CHEWABLE ORAL at 10:01

## 2024-08-19 RX ADMIN — ARFORMOTEROL TARTRATE: 15 SOLUTION RESPIRATORY (INHALATION) at 07:42

## 2024-08-19 RX ADMIN — PANTOPRAZOLE SODIUM 40 MG: 40 TABLET, DELAYED RELEASE ORAL at 06:30

## 2024-08-19 RX ADMIN — IOPAMIDOL 100 ML: 755 INJECTION, SOLUTION INTRAVENOUS at 23:43

## 2024-08-19 RX ADMIN — ENOXAPARIN SODIUM 30 MG: 100 INJECTION SUBCUTANEOUS at 09:52

## 2024-08-19 RX ADMIN — RENO CAPS 1 MG: 100; 1.5; 1.7; 20; 10; 1; 150; 5; 6 CAPSULE ORAL at 09:52

## 2024-08-19 RX ADMIN — MICONAZOLE NITRATE: 20 CREAM TOPICAL at 09:55

## 2024-08-19 RX ADMIN — ISOSORBIDE MONONITRATE 60 MG: 30 TABLET, EXTENDED RELEASE ORAL at 09:52

## 2024-08-19 RX ADMIN — AMLODIPINE BESYLATE 5 MG: 5 TABLET ORAL at 09:50

## 2024-08-19 RX ADMIN — OXYCODONE HYDROCHLORIDE AND ACETAMINOPHEN 1 TABLET: 5; 325 TABLET ORAL at 01:28

## 2024-08-19 ASSESSMENT — PAIN DESCRIPTION - DESCRIPTORS: DESCRIPTORS: ACHING;DISCOMFORT

## 2024-08-19 ASSESSMENT — PAIN DESCRIPTION - LOCATION
LOCATION: NECK
LOCATION: NECK

## 2024-08-19 ASSESSMENT — PAIN SCALES - GENERAL
PAINLEVEL_OUTOF10: 0
PAINLEVEL_OUTOF10: 9
PAINLEVEL_OUTOF10: 10

## 2024-08-19 ASSESSMENT — PAIN - FUNCTIONAL ASSESSMENT: PAIN_FUNCTIONAL_ASSESSMENT: 0-10

## 2024-08-19 ASSESSMENT — PAIN DESCRIPTION - ORIENTATION: ORIENTATION: LEFT

## 2024-08-19 NOTE — PROGRESS NOTES
Hospitalist Progress Note    NAME:   Marilee Oakes   : 1950   MRN: [id number]     Date/Time: 8/15/2024 10:48 AM  Patient PCP: No primary care provider on file.    Estimated discharge date: greater than 24 hours  Barriers: Clinical stability. Awaiting urine culture results. PT/OT evaluation. CM to follow for discharge planning.    Assessment / Plan:  Acute toxic encephalopathy - improving        Complicated UTI  - procalcitonin <0.05, lactic acid 0.85  - preliminary blood cultures on 24 shows NGTD  - influenza and COVID negative  - vitamin B12, folate, vitamin D and ammonia ordered  - CT of head on 24 shows no acute process  - urine culture pending  - strict I&O  - bladder scan  - continue rocephin until final results of culture received      2.  Hypernatremia       CKD 3       Recent temporary hemodialysis   - Na+ 147  - baseline creatinine  1.89 - 3.41  - Creatinine improving 1.54  - continue 0.45% NS as ordered  - monitor lab work  - if no improvement consider nephrology consult    3. COPD       Chronic hypoxemic respiratory failure on 2LNC  - chest xray on 24 shows:  Small right pleural effusion, cardiomegaly  - incentive spirometry  - continue scheduled and prn nebulizer treatments  - turn, cough and deep breathe      4. CAD      Essential HTN      Hyperlipidemia      History of CHF  - JUAN MANUEL on 24 shows an EF of 55%-60% with moderate LVH  - continue norvasc, coreg, aspirin, imdur  - daily weights  - strict I&O    5. Intertrigo  - continue miconazole    6. GERD  - continue PPI    7. Impaired skin integrity      Recent left ankle surgery complicated by MRSA infection  - LLE boot and dressing  - patient previously seen at wound clinic  - follow by Dr. Chan wound care as outpatient  - will consult wound care nurse for recommendations    PT/OT      Medical Decision Making:   I personally reviewed labs: yes  I personally reviewed imaging: yes  I personally reviewed EKG: yes  Toxic drug 
      Hospitalist Progress Note    NAME:   Marilee Oakes   : 1950   MRN: [id number]     Date/Time: 2024 12:00 PM  Patient PCP: No primary care provider on file.    Estimated discharge date:  , [facility] likely  Barriers: Clinical stability. Awaiting urine culture results, SNF as recc arranged, AMS improved/off Sitter >24 hrs      Assessment / Plan:  Acute toxic encephalopathy -still confused overnight, on safety sitter , as per pt 's  pt get like this each time she gets UTI        Likely due to Complicated UTI POA  - procalcitonin <0.05, lactic acid 0.85  - preliminary blood cultures on 24 shows NGTD  - influenza and COVID negative  - vitamin B12, folate, vitamin D and ammonia ordered for completeness- still pending   - CT of head on 24 shows no acute process  - follow urine culture-  growing >100k Gram neg rods colonies  - strict I&O  - bladder scan  - continue IV Abx- Rocephin for now  - PT/OT eval noted-- SNF recommended      2.  Hypernatremia POA- persistent at 146       CKD 3- Cr stable       Recent temporary hemodialysis   - baseline creatinine  1.89 - 3.41  - Creatinine improved at  1.54  - DC IVF- pt improved and drinking Po well  - monitor lab work  - if no improvement consider nephrology consult    3. COPD       Chronic hypoxemic respiratory failure on 2-3LNC  - chest xray on 24 shows:  Small right pleural effusion, cardiomegaly  - incentive spirometry  - continue scheduled and prn nebulizer treatments  - turn, cough and deep breathe      4. CAD      Essential HTN      Hyperlipidemia      History of CHF  - JUAN MANUEL on 24 shows an EF of 55%-60% with moderate LVH  - continue norvasc, coreg, aspirin, imdur  - daily weights  - strict I&O    5. Intertrigo  - continue miconazole    6. GERD  - continue PPI    7. Impaired skin integrity      Recent left ankle surgery complicated by MRSA infection  - LLE boot and dressing  - patient previously seen at wound clinic  - follow by  
      Hospitalist Progress Note    NAME:   Marilee Oakes   : 1950   MRN: [id number]     Date/Time: 2024 11:51 AM  Patient PCP: No primary care provider on file.    Estimated discharge date:  , SNF likely  Barriers: Clinical stability.  SNF as recc arranged, AMS improved      Assessment / Plan:  Acute toxic encephalopathy -still confused overnight due to sun downing,  on safety sitter , as per pt 's  pt get like this each time she gets UTI- improves during day time        Likely due to Complicated UTI POA        Underlying Dementia causing Delirium/sundowning POA  - procalcitonin <0.05, lactic acid 0.85  - preliminary blood cultures on 24 shows NGTD  - influenza and COVID negative  - vitamin B12, folate, vitamin D and ammonia ordered for completeness- still pending   - CT of head on 24 shows no acute process  - follow urine culture-  Klebsiella growing- sensitive to rocephin  - strict I&O  - bladder scan  - continue IV Abx- Rocephin- change to Po Levaquin based on C&S-- lost IV access  - PT/OT eval noted-- SNF recommended but  wants pt home with HH with [facility]   - IV/IM Haldol prn severe agitation, Po geodon is IV lost    2.  Hypernatremia POA- persistent at 147       CKD 3- Cr stable       Recent temporary hemodialysis   - baseline creatinine  1.89 - 3.41  - Creatinine  1.54- > 1.7 today  - DC IVF- pt improved and drinking Po well  - monitor lab work  - if no improvement consider nephrology consult    3. COPD       Chronic hypoxemic respiratory failure on 2-3LNC  - chest xray on 24 shows:  Small right pleural effusion, cardiomegaly  - incentive spirometry  - continue scheduled and prn nebulizer treatments  - turn, cough and deep breathe      4. CAD      Essential HTN      Hyperlipidemia      History of CHF  - JUAN MANUEL on 24 shows an EF of 55%-60% with moderate LVH  - continue norvasc, coreg, aspirin, imdur  - daily weights  - strict I&O    5. Intertrigo  - 
0016: Via perfect serve, messaged on-call physician regarding patients continued restless, confusion, and yelling out. Requesting additional management orders as nonpharmacologic interventions and PRN melatonin, percocet, tylenol, and Zofran are not providing patient any relief.    0017: Via perfect serve, On-call physician advising to message the in-house physician for evaluation to make adjustments to medication regimen.    0132: Via perfect serve, messaged in-house physician regarding patients confusion, restlessness, and yelling out. Physician updated on medication patient has received for both sleep and pain that has been unsuccessful in treating symptoms. Requesting further management at this time.    0139: Via perfect serve, In- house physician advising to try nonpharmacologic interventions and order a sitter if needed, as there is no indication for further sedatives. No new orders at this time.    0141: Via perfect serve, messaged in-house that patient already has a virtual sitter to redirect the patient at this time     0142: Via perfect serve, In- house advising to order in-person sitter if needed, minimize interactions, and dim lights. No new orders at this time.    0148: Via perfect serve, messaged in-house physician to please come to evaluate patient at bedside as patient has been restless for days and symptoms are not relieved with current medication regimen.     0207: Via perfect serve, in-house physician expressed that he will come to bedside to address this concern when he gets a window to come upstairs, and his rational for not providing sedatives as to avoid both morbidity, mortality, and not to further disrupt the patients sleep wake cycle. No new orders at this time.    0246: In-house physician at bedside.     0249: Via perfect serve, in-house messaged after assessment of patient that no orders for intervention will be added at this time. Physician also advising to relay to day-shift nurse to 
9*End of Shift Note    Bedside shift change report given to KI Boyer (oncoming nurse) by Dylon Lucero RN (offgoing nurse).  Report included the following information SBAR REPORTS LIST: SBAR, Kardex, ED Summary, Intake/Output, MAR, Recent Results, Med Rec Status, Alarm Parameters , and Quality Measures    Shift worked:  7a-7p     Shift summary and any significant changes:     Patient more cooperative during nursing care today. Virtual safety monitoring continued. Reorientation provided as required. Kim Salmon more safe to transfer OOB. Patient tolerated OOB and up in the chair at least 2 hours.      Concerns for physician to address:  None     Zone phone for oncoming shift:   [phone]       Activity:  Activity: Kim Salmon for OOB transfer.  Number times ambulated in hallways past shift: 0  Number of times OOB to chair past shift: 1    Cardiac:   Cardiac Monitoring: YES / NO: No        Access:  Current line(s): IV ACCESS: - PIV    Genitourinary:   Urinary status: Urinary status: external catheter    Respiratory:   oxygen delivery: 3 liters/min via nasal cannula  Chronic home O2 use?: YES / NO: Yes  Incentive spirometer at bedside: YES / NO: No      GI:  Current diet:  DIET: regular  Passing flatus: YES / NO: Yes  Tolerating current diet: YES / NO: Yes      Pain Management:   Patient states pain is manageable on current regimen: YES / NO: Yes    Skin:    Interventions: MAYDA SCALE: 16    Patient Safety:  Fall Score: FALL RISK ASSESSMENT: At risk due to:  confusion and ankle fracture  Interventions: Fall Interventions Provided: Implemented/recommended use of non-skid footwear, Implemented/recommended use of fall risk identification flag to all team members, Implemented/recommended assistive devices and encouraged their use, Implemented/recommended resources for alarm system (personal alarm, bed alarm, call bell, etc.) , Implemented/recommended environmental changes (remove hazards, lower bed, improve lighting, etc.), 
BRIEF NOTE:    73-year-old female admitted for toxic encephalopathy secondary to Klebsiella UTI.  Nursing contacting me with concerns for agitation, restlessness, and patient frequently calling out.  Patient given melatonin, Haldol during night shift.  Nursing initially reached out to on-call provider for further medications to assist with agitation and rest who recommended I be contacted for bedside evaluation.      By description patient is not posing a risk to herself or staff and given well-documented adverse outcomes including higher rates of morbidity and mortality in geriatric population receiving sedative medications I informed nursing I would not pursue sedative medications and recommended nonpharmacologic interventions.  I have already ordered delirium precaution/management instructions for nursing at time of admission 8/15.  Recommended consideration of sitter if she had concerns patient would attempt to get a better fall.  Was informed that patient has a virtual sitter and is redirectable further substantiating decision not to pursue sedative therapies.    Nursing page me again informing me that patient will not allow staff to turn off her lights or shut her door and that patient has not slept for days and they believe she requires additional management. I was then informed that patient is complaining of pain and nausea-has received Percocet, Tylenol, Zofran.  Pain is reportedly in her neck and shoulders.    Given nontraumatic and apparent chronic nature of pain would avoid escalating opiates as this not indicated in chronic musculoskeletal pain and is also associated with worsened geriatric outcomes and worsening delirium and may be contributing to nausea. NSAIDs contraindicated in [place]. Trial lidocaine patch and topical ice as desired.    Informed nursing there would be a delay in coming to see the patient in person as there is single provider coverage in-house and I have more acute patient complaints 
Called spouse Wesley to notify him that due to his wife's confusion we have placed a virtual  in the room to help keep her safe. Wesley stated understanding and gave permission. Wesley states in the past when his wife has had UTIs she has required the use of a VSC until the confusion cleared with treatment.  
Chart reviewed for PCP hospital follow up. Patient's current ASHANTI is 8/19/24. Patient is currently recommended for SNF. Pending patient discharge.  
Chart reviewed. Patient is having increasing confusion this afternoon. Her RN recommends PT defer at this time. Will defer and continue to follow.    Rico Shepherd, PT    
End of Shift Note    Bedside shift change report given to  AGNES SALINAS  (oncoming nurse) by Adali Seals RN .        Shift worked:  DAYS   Shift summary and any significant changes:     -LAST BM 8/16/24   -WORKED WITH PT/OT     Concerns for physician to address:  NONE   Zone phone for oncoming shift:   [phone]     Patient Information  Marilee Oakes  73 y.o.  8/14/2024  8:25 PM by Miguel Rivero DO. Marilee Oakes was admitted from [facility]               
End of Shift Note    Bedside shift change report given to AGNES Collazo (oncoming nurse) by Dylon Lucero RN (offgoing nurse).  Report included the following information SBAR REPORTS LIST: SBAR, Kardex, Procedure Summary, Intake/Output, MAR, Recent Results, Med Rec Status, and Quality Measures    Shift worked:  7a-7p     Shift summary and any significant changes:     Patient refused cardiac monitoring device, and PIV placement, provider informed and discontinuation of tele authorized. Labs completed. Patient refused some of her meals. PO hydration encouraged during shift. Virtual safety monitoring continued. Patient reported neck pain, received pain medication as ordered. Prefers tylenol for pain.     Concerns for physician to address:  Confusion     Zone phone for oncoming shift:   #[phone]       Activity:  Activity: NWB on left ankle  Number times ambulated in hallways past shift: 0  Number of times OOB to chair past shift: 0    Cardiac:   Cardiac Monitoring: YES / NO: No and refused        Access:  Current line(s): IV ACCESS: - None    Genitourinary:   Urinary status: Urinary status: external catheter    Respiratory:   oxygen delivery: 3 liters/min via nasal cannula  Chronic home O2 use?: YES / NO: Yes  Incentive spirometer at bedside: YES / NO: No      GI:  Current diet:  DIET: regular  Passing flatus: YES / NO: Yes  Tolerating current diet: YES / NO: Yes occasional eat small bites but prefers PO fluid.      Pain Management:   Patient states pain is manageable on current regimen: YES / NO: Yes    Skin:    Interventions: MAYDA SCALE: 18    Patient Safety:  Fall Score: FALL RISK ASSESSMENT: At risk due to:  fracture of left ankle and confusion  Interventions: Fall Interventions Provided: Implemented/recommended use of non-skid footwear, Implemented/recommended use of fall risk identification flag to all team members, Implemented/recommended assistive devices and encouraged their use, Implemented/recommended 
End of Shift Note    Bedside shift change report given to AGNES Gerber (oncoming nurse) by Rosalind Ware LPN (offgoing nurse).  Report included the following information SBAR, Kardex, Intake/Output, MAR, Accordion, Recent Results, and Med Rec Status    Shift worked:  0631-0903     Shift summary and any significant changes:    Labs collected. Tele sitter continued. PRN percocet given at 2225 and 0232. PRN tylenol given at 0055. PRN melatonin given at 2206. One-time does of ativan given at  0259 for restlessness. Plan of are ongoing.   Concerns for physician to address: Patient continuous to be restless and confused.   Zone phone for oncoming shift:   [phone]       Activity:     Number times ambulated in hallways past shift: 0  Number of times OOB to chair past shift: 0    Cardiac:   Cardiac Monitoring: No           Access:  Current line(s): PIV     Genitourinary:   Urinary status: incontinent and external catheter    Respiratory:      Chronic home O2 use?: NO  Incentive spirometer at bedside: YES       GI:     Current diet:  ADULT DIET; Regular; 4 carb choices (60 gm/meal); Low Fat/Low Chol/High Fiber/KAREEM  Passing flatus: YES  Tolerating current diet: YES       Pain Management:   Patient states pain is manageable on current regimen: YES    Skin:     Interventions: float heels, PT/OT consult, and internal/external urinary devices    Patient Safety:  Fall Score:    Interventions: bed/chair alarm, assistive device (walker, cane. etc), gripper socks, pt to call before getting OOB, stay with me (per policy), and tele-sitter       Length of Stay:  Expected LOS: 5  Actual LOS: 2      Rosalind Ware LPN                           
End of Shift Note    Bedside shift change report given to AGNES PEREZ (oncoming nurse) by Vale Pa RN (offgoing nurse).  Report included the following information SBAR, Kardex, Intake/Output, Recent Results, Med Rec Status, Cardiac Rhythm  , Alarm Parameters , and Quality Measures    Shift worked:       Shift summary and any significant changes:     PT CONFUSED most pf the  night, tasking/trying to get out bed, pulling off NC.  Constant neck pain- meds given as ordered and turning.  CONTINUE  TELE MONITORING     Concerns for physician to address:       Zone phone for oncoming shift:          Activity:     Number times ambulated in hallways past shift: 0  Number of times OOB to chair past shift: 0    Cardiac:   Cardiac Monitoring: No           Access:  Current line(s): PIV     Genitourinary:   Urinary status: voiding, incontinent, and external catheter    Respiratory:      Chronic home O2 use?: YES  Incentive spirometer at bedside: YES       GI:     Current diet:  ADULT DIET; Regular; 4 carb choices (60 gm/meal); Low Fat/Low Chol/High Fiber/KAREEM  Passing flatus: YES  Tolerating current diet: YES       Pain Management:   Patient states pain is manageable on current regimen: YES    Skin:     Interventions: turn team, specialty bed, float heels, limit briefs, and internal/external urinary devices    Patient Safety:  Fall Score:    Interventions: bed/chair alarm, assistive device (walker, cane. etc), gripper socks, and pt to call before getting OOB       Length of Stay:  Expected LOS: 5  Actual LOS: 2      Vale Pa RN                           
End of Shift Note    Bedside shift change report given to KI Diaz (oncoming nurse) by Zabrina Morales RN (offgoing nurse).  Report included the following information SBAR REPORTS LIST: SBAR, Intake/Output, and MAR    Shift worked:  7-1930     Shift summary and any significant changes:     N/a     Concerns for physician to address:  N/a     Zone phone for oncoming shift:   [phone]       Activity:  Activity: bed bound  Number times ambulated in hallways past shift: 0  Number of times OOB to chair past shift: 0    Cardiac:   Cardiac Monitoring: YES / NO: No        Access:  Current line(s): IV ACCESS: - Peripheral IV - site  no IV, insertion date: N/a    Genitourinary:   Urinary status: Urinary status: Patient is voiding without difficulty.    Respiratory:   oxygen delivery: 3   liters/min via nasal cannula  Chronic home O2 use?: YES / NO: No  Incentive spirometer at bedside: YES / NO: No      GI:  Current diet:  DIET: regular  Passing flatus: YES / NO: Yes  Tolerating current diet: YES / NO: Yes      Pain Management:   Patient states pain is manageable on current regimen: YES / NO: Yes    Skin:    Interventions: MAYDA SCALE: 15    Patient Safety:  Fall Score: FALL RISK ASSESSMENT: NA  Interventions: Fall Interventions Provided: Implemented/recommended use of non-skid footwear      Length of Stay:  Expected LOS: 5  Actual LOS: 3      Zabrina Morales RN                           
Messaged by nursing and informed  patient was experiencing hematuria.  Has UA positive for Klebsiella sensitive to ceftriaxone.  Unclear if there is been recent straight catheterization-awaiting nursing response.  Nevertheless.  Patient is hemodynamically stable and despite bright red appearance of urine and this is likely reflective of low volumes of bleeding.  Will add on bladder checks to ensure no retention from clot.  Defer need for urology consultation to daytime pending continued versus resolution of hematuria.  
NEW PATIENT PCP transitional care appointment has been scheduled with Dr. Darien Potter on 9/13/24 8790. [facility] placed [facility] information AVS for patient resource. Pending patient discharge. Iman De Santiago, Care Management Assistant  
Nursing contacted Nocturnist/cross cover provider via non-urgent messaging system [employer] and notified patient anxious. No other concerns reported. No acute distress reported. No other information provided by nurse. VSS.     Ordered atarax 10mg po x1. Will defer further evaluation/management to the day shift primary attending care team. Patient denies any further complaints or concerns.     Nursing to notify Hospitalist for further/continued concerns. Will remain available overnight for further concerns if nursing/patient needs. Please note, there are RRT systems in this hospital in place that if nursing has acute or critical patient condition change or concern, this is to help facilitate and notify that patient needs immediate bedside evaluation by a provider.     Non-billable note.      
Nursing contacted Nocturnist/cross cover provider via non-urgent messaging system [employer] and notified patient confused, screaming, crying, stating she wants to go home, states appears in pain and gave pain med at 2308 oxycodone p.o., and also reported patient had 2 doses of melatonin.,  Nurse asking for additional medication for pain, anxiety no other concerns reported. No acute distress reported. No other information provided by nurse.  Upon clarification with nurse via [employer] [place] nurse reported patient is psychotic, crying and yelling, asking for additional pain meds and states that patient recently pulled out both of her peripheral IVs and has no IV access.  VSS.     Ordered morphine 2 mg injection for pain, see if this alleviates patient's symptoms that she is having and helps her calm down sufficiently, allowing nurse to be able to reinitiate IV therapy access when able.  Vital signs stable as prior recently documented.  No acute distress reported.  At this time due to patient's risk for safety to herself, pulling out lines, of interfering with medical treatments will initiate the morphine to see if that helps alleviate her pain.  And helps her calm down.  Appears her last QTc was greater than 500 on recent EKG so would try to defer additional medications that may contribute to prolongation of the QTc at this time. Will defer further evaluation/management to the day shift primary attending care team. Patient denies any further complaints or concerns.     Nursing to notify Hospitalist for further/continued concerns. Will remain available overnight for further concerns if nursing/patient needs. Please note, there are RRT systems in this hospital in place that if nursing has acute or critical patient condition change or concern, this is to help facilitate and notify that patient needs immediate bedside evaluation by a provider.     Update:  9850 nurse reported pt restless, asking for something as states pt 
Occupational Therapy  Will hold OT, per RN, as pt is having increased confusion this afternoon.  Will continue to follow.    
Pt is discharged , transport by [facility] transport service, handoff report given to the [facility]   Pt is going home with HH., discharge documents given to [facility] transport person .   IV removed ,tylenol  pain med. Given before discharged   Pt alert & ox2 , 3L nc, wound dressing changed , bp elevated , doctor is aware .     
Weight Bearing (with walking boot)            Chart, occupational therapy assessment, plan of care, and goals were reviewed.    ASSESSMENT  Patient continues to benefit from skilled OT services and is slowly progressing towards goals. Pt was feeling nauseated this session; she was able to manage the emesis bag.   feels that the nausea is due to not eating well over the past several days.    Pt was seated in chair and  suggested getting dressed in preparation for returning home this date.  Pt needed maximal assistance for lower body adls and maximal assistance for transfer to bed.  At home,  reports use of sliding board that he has no concerns re: returning to using the board for transfers.    Pt continues to be non WB LLE in boot when OOB and needs assistance to maintain.    Multiple bouts of nausea during tx session this date, requiring encouragement and additional time to recover.  Nursing aware and present.             PLAN :  Patient continues to benefit from skilled intervention to address the above impairments.  Continue treatment per established plan of care to address goals.    Recommend with staff: encourage upright at bed level    Recommend next OT session: POC    Recommendation for discharge: (in order for the patient to meet his/her long term goals): Therapy 2x a week in the home, however, may require supervision, cognitive and/or physical assistance due to the following concerns listed below:  will be taking pt home--medical transport    Other factors to consider for discharge: high risk for falls, not safe to be alone, concern for safely navigating or managing the home environment, and  weight bearing restrictions limiting activity or patient is unable to maintain    IF patient discharges home will need the following DME: none       SUBJECTIVE:   Patient stated “I feel nauseated.”    OBJECTIVE DATA SUMMARY:   Cognitive/Behavioral Status:  Orientation  Overall Orientation 
acute distress noted.  PHYSICAL EXAM:  General: Frail, Alert, cooperative  EENT:  EOMI. Anicteric sclerae.  Resp:  CTA bilaterally, no wheezing or rales.  No accessory muscle use  CV:  Regular  rhythm, generalized edema noted to bilateral lower extremities. Telemetry:  vpaced  GI:  Soft, Non distended, Non tender.  +Bowel sounds  Neurologic:  Alert and oriented X 3, normal speech,   Psych:   Not anxious nor agitated  Skin:  No rashes.  No jaundice. Boot  and dressing intact to LLE    Reviewed most current lab test results and cultures  YES  Reviewed most current radiology test results   YES  Review and summation of old records today    NO  Reviewed patient's current orders and MAR    YES  PMH/SH reviewed - no change compared to H&P    Procedures: see electronic medical records for all procedures/Xrays and details which were not copied into this note but were reviewed prior to creation of Plan.      LABS:  I reviewed today's most current labs and imaging studies.  Pertinent labs include:  Recent Labs     08/14/24  2055 08/15/24  0629   WBC 5.2 5.6   HGB 9.4* 9.6*   HCT 32.8* 34.1*    147*     Recent Labs     08/14/24  2055 08/14/24  2240 08/14/24  2247 08/15/24  0629   *  --   --  147*   K 3.5  --   --  3.5   *  --   --  112*   CO2 32  --   --  27   GLUCOSE 115*  --   --  114*   BUN 28*  --   --  25*   CREATININE 1.73*  --  1.7* 1.54*   CALCIUM 9.2  --   --  9.1   MG  --  1.8  --  1.6   BILITOT 0.5  --   --   --    AST 10*  --   --   --    ALT 12  --   --   --        Signed: Lisa Troy MD

## 2024-08-19 NOTE — PALLIATIVE CARE DISCHARGE
Goals of Care/Treatment Preferences    The Palliative Medicine team was consulted as part of your/your loved one's care in the hospital. Our team is a supportive service; we strive to relieve suffering and improve quality of life.    We reviewed advance care planning information, which includes the following:    Primary Decision Maker: Wesley Oakes - Spouse - [phone]    Secondary Decision Maker: Shelia Malik - Child - [phone]  Patient's Healthcare Decision Maker is:: Legal Next of Kin  Confirm Advance Directive: None  Patient Would Like to Complete Advance Directive: Unable    Patient/Health Care Proxy Stated Goals: Prolong life    We reviewed / discussed your code status as:   Code Status: Full Code     “Full Code” means perform CPR in the event of cardiac arrest.      “DNR” means do NOT perform CPR in the event of cardiac arrest.      “Partial Code” means you have specific preferences, please discuss with your healthcare team.      “No Order” means this issue was not addressed / resolved during your stay    Medical Interventions: Full interventions  Other Instructions:     Artificially Administered Nutrition: No feeding tube    Because of the importance of this information, we are providing you with a printed copy to share with other healthcare providers after this hospitalization is complete.

## 2024-08-19 NOTE — PLAN OF CARE
Problem: Discharge Planning  Goal: Discharge to home or other facility with appropriate resources  Outcome: Progressing     Problem: Skin/Tissue Integrity  Goal: Absence of new skin breakdown  Description: 1.  Monitor for areas of redness and/or skin breakdown  2.  Assess vascular access sites hourly  3.  Every 4-6 hours minimum:  Change oxygen saturation probe site  4.  Every 4-6 hours:  If on nasal continuous positive airway pressure, respiratory therapy assess nares and determine need for appliance change or resting period.  Outcome: Progressing     Problem: Safety - Adult  Goal: Free from fall injury  Outcome: Progressing     
  Problem: Discharge Planning  Goal: Discharge to home or other facility with appropriate resources  Outcome: Progressing     Problem: Skin/Tissue Integrity  Goal: Absence of new skin breakdown  Description: 1.  Monitor for areas of redness and/or skin breakdown  2.  Assess vascular access sites hourly  3.  Every 4-6 hours minimum:  Change oxygen saturation probe site  4.  Every 4-6 hours:  If on nasal continuous positive airway pressure, respiratory therapy assess nares and determine need for appliance change or resting period.  Outcome: Progressing     Problem: Safety - Adult  Goal: Free from fall injury  Outcome: Progressing     Problem: Respiratory - Adult  Goal: Achieves optimal ventilation and oxygenation  8/18/2024 1026 by Zabrina Morales, RN  Outcome: Progressing  8/17/2024 2041 by Natalia Skaggs, RT  Outcome: Progressing     Problem: Chronic Conditions and Co-morbidities  Goal: Patient's chronic conditions and co-morbidity symptoms are monitored and maintained or improved  Outcome: Progressing     Problem: Pain  Goal: Verbalizes/displays adequate comfort level or baseline comfort level  Outcome: Progressing     
  Problem: Occupational Therapy - Adult  Goal: By Discharge: Performs self-care activities at highest level of function for planned discharge setting.  See evaluation for individualized goals.  Description: FUNCTIONAL STATUS PRIOR TO ADMISSION:  Patient admitted 6/2024 for L ankle fracture debridement, discharged to SNF with NWB precautions. Discharged home 7/2024 with spouse assisting with ADLs and slide board transfers. Patient remains LLE NWB in walking boot, has been receiving HHOT/PT.     Receives Help From: Family, ADL Assistance: Needs assistance (spouse assists with LB ADLs), Ambulation Assistance: Non-ambulatory (wheelchair, working on stand pivot transfer with HH), Transfer Assistance: Needs assistance (transfer to wheelchair with transfer board)    HOME SUPPORT: Patient lived alone with spouse to provide assistance.    Occupational Therapy Goals:  Initiated 8/15/2024  1.  Patient will perform bathing with Moderate Assist within 7 day(s).  2.  Patient will perform upper body dressing with Contact Guard Assist within 7 day(s).  3.  Patient will perform lower body dressing with Maximal Assist within 7 day(s).  4.  Patient will perform stand pivot toilet transfers with Maximal Assist while maintaining NWB precautions within 7 day(s).  5.  Patient will perform all aspects of toileting with Maximal Assist within 7 day(s).  6.  Patient will participate in upper extremity therapeutic exercise/activities with Contact Guard Assist for 5 minutes within 7 day(s).    7.  Patient will utilize energy conservation techniques during functional activities with verbal cues within 7 day(s).    Outcome: Progressing     OCCUPATIONAL THERAPY EVALUATION    Patient: Marilee Oakes (73 y.o. female)  Date: 8/15/2024  Primary Diagnosis: Complicated UTI (urinary tract infection) [N39.0]         Precautions: Weight Bearing   Left Lower Extremity Weight Bearing: Non Weight Bearing (with walking boot)              ASSESSMENT :  The 
  The patient is limited by decreased functional mobility, independence in ADLs, high-level IADLs, ROM, strength, body mechanics, activity tolerance, endurance, safety awareness, cognition, attention/concentration, coordination, balance following admission 8/14/24 due to UTI with AMS.    Based on the impairments listed above the patient is functioning well below her recent baseline at home noted above. Currently demonstrates cg assistance with supine to sitting with HOB elevated and the patient using rails. Sitting balance was intact. Sit to stand needed max a x 2 and the patient was unable to maintain NWB on LLE. Mod a x 2 for lateral scoot transfer from bed to drop arm recliner chair was performed. Left up in chair with all needs met when the session ended.    Patient will benefit from skilled intervention to address the above impairments.    Functional Outcome Measure:  The patient scored 10/24 on the [facility] outcome measure.  Cutoff score ?171,2,3 had higher odds of discharging home with home health or need of SNF/IPR.       PLAN :  Recommendations and Planned Interventions:   bed mobility training, transfer training, therapeutic exercises, neuromuscular re-education, edema management/control, patient and family training/education, and therapeutic activities    Frequency/Duration: Patient will be followed by physical therapy to address goals, PT Plan of Care: 5 times/week to address goals.    Recommend with staff: therapy recommendations for staff: Recommend mobility with staff assist x2 using gait belt and Sliding Board. and Recommend toileting using  recommended toilet device: a bed pan.    Recommend for next PT session: using sliding board and lateral transfers to a chair    Recommendation for discharge: (in order for the patient to meet his/her long term goals): Therapy up to 5 days/week in Skilled nursing facility    Other factors to consider for discharge: patient's current support system is unable to meet 
                                                                         Pain Ratin/10   Pain Intervention(s):   pain is at a level acceptable to the patient    Activity Tolerance:   Fair , SpO2 stable on room air, and limited by nausea    After treatment:   Patient left in no apparent distress in bed, Call bell within reach, and working with RN      COMMUNICATION/EDUCATION:   The patient's plan of care was discussed with: occupational therapist, registered nurse, and     Patient Education  Education Given To: Patient;Family  Education Provided: Role of Therapy;Plan of Care;Precautions;Transfer Training;Fall Prevention Strategies;ADL Adaptive Strategies;Equipment;Family Education;Home Exercise Program;Orientation  Education Method: Demonstration;Verbal;Teach Back  Barriers to Learning: Cognition  Education Outcome: Continued education needed;Verbalized understanding      Rico Shepherd, PT  Minutes: 40

## 2024-08-19 NOTE — CARE COORDINATION
Transition of Care Plan:    RUR: 32% High Risk  Prior Level of Functioning: Assistance with ADL's  Disposition: Home with [facility]  If SNF or IPR: Date FOC offered: 8/16 (Declined)  Follow up appointments: New PCP follow up  DME needed: N/A  Transportation at discharge: [facility] @1:00PM  IM/IMM Medicare/ letter given: 2 IM Given 8/19  Is patient a [place] and connected with [facility]? N/A  Caregiver Contact:   Wesley Oakes (Spouse)  [phone] (Mobile)   Discharge Caregiver contacted prior to discharge? Yes  Care Conference needed? N/A  Barriers to discharge:  N/A       CM acknowledged d/c. Chart reviewed, IDR completed. Pt will d/c home with follow up apts and [facility] with [facility]. Pt's spouse has been notified of d/c. [facility] will pick her up for d/c @1:00pm. ?2 IM letter given 8/19. CM will remain accessible if any needs arise prior to discharge.         08/19/24 1202   Services At/After Discharge   Transition of Care Consult (CM Consult) Home Health   Services At/After Discharge Home Health    Resource Information Provided? No   Mode of Transport at Discharge BLS   Hospital Transport Time of Discharge 1300   Confirm Follow Up Transport Family   Condition of Participation: Discharge Planning   The Plan for Transition of Care is related to the following treatment goals: Pt will discharge home with a New PCP appt. and [facility]   The Patient and/or Patient Representative was provided with a Choice of Provider? Patient   The Patient and/Or Patient Representative agree with the Discharge Plan? Yes   Freedom of Choice list was provided with basic dialogue that supports the patient's individualized plan of care/goals, treatment preferences, and shares the quality data associated with the providers?  No  (N/A ([facility]))       CARLEE Geller,CM  [phone]

## 2024-08-19 NOTE — DISCHARGE SUMMARY
spent coordinating this discharge (includes going over instructions, follow-up, prescriptions, and preparing report for sign off to her PCP) :  35 minutes

## 2024-08-19 NOTE — DISCHARGE INSTRUCTIONS
HOSPITALIST DISCHARGE INSTRUCTIONS    NAME: Marilee Oakes   :  1950   MRN:  [id number]     Date/Time:  2024 12:42 PM    ADMIT DATE: 2024     DISCHARGE DATE: 2024     DISCHARGE DIAGNOSIS:  Acute toxic encephalopathy POA - resolved in Ams with  at bedside  Likely due to Complicated UTI POA- Klebsiella on urine cx- cont Levaquin on discharge  Underlying Dementia causing Delirium/ POA- improved now, better at home per   Hypernatremia POA- improved 146  CKD 3- Cr stable at 1.53  H/o Recent temporary hemodialysis   COPD   Chronic hypoxemic respiratory failure on 2-3LNC  CAD  Essential HTN  Hyperlipidemia  History of CHF  Intertrigo  GERD  Impaired skin integrity  Recent left ankle surgery complicated by MRSA infection- Outpatient followup with Dr Chan on Wednesday as arranged  Full code    MEDICATIONS:  As per medication reconciliation  list  It is important that you take the medication exactly as they are prescribed.   Keep your medication in the bottles provided by the pharmacist and keep a list of the medication names, dosages, and times to be taken in your wallet.   Do not take other medications without consulting your doctor.     Pain Management: per above medications    What to do at Home    Recommended diet:  cardiac diet, diabetic diet, and low fat, low cholesterol diet    Recommended activity: activity as tolerated    If you have questions regarding the hospital related prescriptions or hospital related issues please call at .    If you experience any of the following symptoms then please call your primary care physician or return to the emergency room if you cannot get hold of your doctor:  Fever, chills, nausea, vomiting, diarrhea, change in mentation, falling, bleeding, shortness of breath,     Follow Up:  PCP  you are to call and set up an appointment to see them in 7-10 days.  Dr Chan (ortho ankle specialist) on

## 2024-08-20 ENCOUNTER — APPOINTMENT (OUTPATIENT)
Facility: HOSPITAL | Age: 74
DRG: 291 | End: 2024-08-20
Payer: MEDICARE

## 2024-08-20 PROBLEM — I50.33 ACUTE ON CHRONIC DIASTOLIC HEART FAILURE (HCC): Status: ACTIVE | Noted: 2024-08-20

## 2024-08-20 LAB
ANION GAP SERPL CALC-SCNC: 3 MMOL/L (ref 5–15)
APPEARANCE UR: CLEAR
BACTERIA SPEC CULT: NORMAL
BACTERIA SPEC CULT: NORMAL
BACTERIA URNS QL MICRO: NEGATIVE /HPF
BILIRUB UR QL: NEGATIVE
BUN SERPL-MCNC: 16 MG/DL (ref 6–20)
BUN/CREAT SERPL: 9 (ref 12–20)
CALCIUM SERPL-MCNC: 9.3 MG/DL (ref 8.5–10.1)
CHLORIDE SERPL-SCNC: 111 MMOL/L (ref 97–108)
CO2 SERPL-SCNC: 32 MMOL/L (ref 21–32)
COLOR UR: ABNORMAL
CREAT SERPL-MCNC: 1.71 MG/DL (ref 0.55–1.02)
EPITH CASTS URNS QL MICRO: ABNORMAL /LPF
GLUCOSE BLD STRIP.AUTO-MCNC: 108 MG/DL (ref 65–117)
GLUCOSE BLD STRIP.AUTO-MCNC: 122 MG/DL (ref 65–117)
GLUCOSE BLD STRIP.AUTO-MCNC: 196 MG/DL (ref 65–117)
GLUCOSE BLD STRIP.AUTO-MCNC: 201 MG/DL (ref 65–117)
GLUCOSE BLD STRIP.AUTO-MCNC: 354 MG/DL (ref 65–117)
GLUCOSE BLD STRIP.AUTO-MCNC: 95 MG/DL (ref 65–117)
GLUCOSE SERPL-MCNC: 111 MG/DL (ref 65–100)
GLUCOSE UR STRIP.AUTO-MCNC: NEGATIVE MG/DL
HGB UR QL STRIP: ABNORMAL
HYALINE CASTS URNS QL MICRO: ABNORMAL /LPF (ref 0–2)
KETONES UR QL STRIP.AUTO: NEGATIVE MG/DL
LEUKOCYTE ESTERASE UR QL STRIP.AUTO: ABNORMAL
MAGNESIUM SERPL-MCNC: 1.7 MG/DL (ref 1.6–2.4)
NITRITE UR QL STRIP.AUTO: NEGATIVE
NT PRO BNP: 9930 PG/ML
PH UR STRIP: 5.5 (ref 5–8)
POTASSIUM SERPL-SCNC: 3.2 MMOL/L (ref 3.5–5.1)
PROT UR STRIP-MCNC: ABNORMAL MG/DL
RBC #/AREA URNS HPF: ABNORMAL /HPF (ref 0–5)
SERVICE CMNT-IMP: ABNORMAL
SERVICE CMNT-IMP: NORMAL
SODIUM SERPL-SCNC: 146 MMOL/L (ref 136–145)
SP GR UR REFRACTOMETRY: 1.02
TROPONIN I SERPL HS-MCNC: 37 NG/L (ref 0–51)
TROPONIN I SERPL HS-MCNC: 39 NG/L (ref 0–51)
URINE CULTURE IF INDICATED: ABNORMAL
UROBILINOGEN UR QL STRIP.AUTO: 0.2 EU/DL (ref 0.2–1)
WBC URNS QL MICRO: ABNORMAL /HPF (ref 0–4)

## 2024-08-20 PROCEDURE — 6360000002 HC RX W HCPCS: Performed by: INTERNAL MEDICINE

## 2024-08-20 PROCEDURE — 71045 X-RAY EXAM CHEST 1 VIEW: CPT

## 2024-08-20 PROCEDURE — 6360000002 HC RX W HCPCS: Performed by: EMERGENCY MEDICINE

## 2024-08-20 PROCEDURE — 96374 THER/PROPH/DIAG INJ IV PUSH: CPT

## 2024-08-20 PROCEDURE — 81001 URINALYSIS AUTO W/SCOPE: CPT

## 2024-08-20 PROCEDURE — 94640 AIRWAY INHALATION TREATMENT: CPT

## 2024-08-20 PROCEDURE — 80048 BASIC METABOLIC PNL TOTAL CA: CPT

## 2024-08-20 PROCEDURE — 82962 GLUCOSE BLOOD TEST: CPT

## 2024-08-20 PROCEDURE — 83735 ASSAY OF MAGNESIUM: CPT

## 2024-08-20 PROCEDURE — 36415 COLL VENOUS BLD VENIPUNCTURE: CPT

## 2024-08-20 PROCEDURE — 6370000000 HC RX 637 (ALT 250 FOR IP): Performed by: INTERNAL MEDICINE

## 2024-08-20 PROCEDURE — 1100000003 HC PRIVATE W/ TELEMETRY

## 2024-08-20 PROCEDURE — 2580000003 HC RX 258: Performed by: INTERNAL MEDICINE

## 2024-08-20 RX ORDER — TRIAMCINOLONE ACETONIDE 1 MG/G
CREAM TOPICAL 2 TIMES DAILY
Status: DISCONTINUED | OUTPATIENT
Start: 2024-08-20 | End: 2024-08-26 | Stop reason: HOSPADM

## 2024-08-20 RX ORDER — POLYETHYLENE GLYCOL 3350 17 G/17G
17 POWDER, FOR SOLUTION ORAL DAILY
Status: DISCONTINUED | OUTPATIENT
Start: 2024-08-20 | End: 2024-08-26 | Stop reason: HOSPADM

## 2024-08-20 RX ORDER — CARVEDILOL 12.5 MG/1
25 TABLET ORAL 2 TIMES DAILY WITH MEALS
Status: DISCONTINUED | OUTPATIENT
Start: 2024-08-20 | End: 2024-08-26 | Stop reason: HOSPADM

## 2024-08-20 RX ORDER — PANTOPRAZOLE SODIUM 40 MG/1
40 TABLET, DELAYED RELEASE ORAL
Status: DISCONTINUED | OUTPATIENT
Start: 2024-08-20 | End: 2024-08-26 | Stop reason: HOSPADM

## 2024-08-20 RX ORDER — ISOSORBIDE MONONITRATE 30 MG/1
60 TABLET, EXTENDED RELEASE ORAL DAILY
Status: DISCONTINUED | OUTPATIENT
Start: 2024-08-20 | End: 2024-08-26 | Stop reason: HOSPADM

## 2024-08-20 RX ORDER — ACETAMINOPHEN 650 MG/1
650 SUPPOSITORY RECTAL EVERY 6 HOURS PRN
Status: DISCONTINUED | OUTPATIENT
Start: 2024-08-20 | End: 2024-08-26 | Stop reason: HOSPADM

## 2024-08-20 RX ORDER — BUMETANIDE 0.25 MG/ML
1 INJECTION INTRAMUSCULAR; INTRAVENOUS 2 TIMES DAILY
Status: DISCONTINUED | OUTPATIENT
Start: 2024-08-20 | End: 2024-08-20

## 2024-08-20 RX ORDER — AMLODIPINE BESYLATE 5 MG/1
5 TABLET ORAL DAILY
Status: DISCONTINUED | OUTPATIENT
Start: 2024-08-20 | End: 2024-08-20

## 2024-08-20 RX ORDER — AMLODIPINE BESYLATE 5 MG/1
5 TABLET ORAL DAILY
Status: DISCONTINUED | OUTPATIENT
Start: 2024-08-21 | End: 2024-08-26 | Stop reason: HOSPADM

## 2024-08-20 RX ORDER — ONDANSETRON 2 MG/ML
4 INJECTION INTRAMUSCULAR; INTRAVENOUS EVERY 6 HOURS PRN
Status: DISCONTINUED | OUTPATIENT
Start: 2024-08-20 | End: 2024-08-26 | Stop reason: HOSPADM

## 2024-08-20 RX ORDER — PREGABALIN 75 MG/1
150 CAPSULE ORAL DAILY
Status: DISCONTINUED | OUTPATIENT
Start: 2024-08-20 | End: 2024-08-26 | Stop reason: HOSPADM

## 2024-08-20 RX ORDER — NEPHROCAP 1 MG
1 CAP ORAL DAILY
Status: DISCONTINUED | OUTPATIENT
Start: 2024-08-20 | End: 2024-08-26 | Stop reason: HOSPADM

## 2024-08-20 RX ORDER — POLYETHYLENE GLYCOL 3350 17 G/17G
17 POWDER, FOR SOLUTION ORAL DAILY PRN
Status: DISCONTINUED | OUTPATIENT
Start: 2024-08-20 | End: 2024-08-26 | Stop reason: HOSPADM

## 2024-08-20 RX ORDER — QUETIAPINE FUMARATE 25 MG/1
25 TABLET, FILM COATED ORAL EVERY EVENING
Status: DISCONTINUED | OUTPATIENT
Start: 2024-08-20 | End: 2024-08-26 | Stop reason: HOSPADM

## 2024-08-20 RX ORDER — SODIUM CHLORIDE 9 MG/ML
INJECTION, SOLUTION INTRAVENOUS PRN
Status: DISCONTINUED | OUTPATIENT
Start: 2024-08-20 | End: 2024-08-26 | Stop reason: HOSPADM

## 2024-08-20 RX ORDER — FERROUS SULFATE 325(65) MG
325 TABLET ORAL
Status: DISCONTINUED | OUTPATIENT
Start: 2024-08-20 | End: 2024-08-26 | Stop reason: HOSPADM

## 2024-08-20 RX ORDER — ENOXAPARIN SODIUM 100 MG/ML
30 INJECTION SUBCUTANEOUS 2 TIMES DAILY
Status: DISCONTINUED | OUTPATIENT
Start: 2024-08-20 | End: 2024-08-26 | Stop reason: HOSPADM

## 2024-08-20 RX ORDER — SODIUM CHLORIDE 0.9 % (FLUSH) 0.9 %
5-40 SYRINGE (ML) INJECTION PRN
Status: DISCONTINUED | OUTPATIENT
Start: 2024-08-20 | End: 2024-08-26 | Stop reason: HOSPADM

## 2024-08-20 RX ORDER — AMLODIPINE BESYLATE 5 MG/1
5 TABLET ORAL ONCE
Status: COMPLETED | OUTPATIENT
Start: 2024-08-20 | End: 2024-08-20

## 2024-08-20 RX ORDER — BUMETANIDE 0.25 MG/ML
1 INJECTION INTRAMUSCULAR; INTRAVENOUS ONCE
Status: COMPLETED | OUTPATIENT
Start: 2024-08-20 | End: 2024-08-20

## 2024-08-20 RX ORDER — MAGNESIUM OXIDE 400 MG/1
400 TABLET ORAL 2 TIMES DAILY
Status: DISCONTINUED | OUTPATIENT
Start: 2024-08-20 | End: 2024-08-20 | Stop reason: CLARIF

## 2024-08-20 RX ORDER — ACETAMINOPHEN 325 MG/1
650 TABLET ORAL EVERY 6 HOURS PRN
Status: DISCONTINUED | OUTPATIENT
Start: 2024-08-20 | End: 2024-08-26 | Stop reason: HOSPADM

## 2024-08-20 RX ORDER — ECHINACEA PURPUREA EXTRACT 125 MG
2 TABLET ORAL EVERY 8 HOURS PRN
Status: DISCONTINUED | OUTPATIENT
Start: 2024-08-20 | End: 2024-08-26 | Stop reason: HOSPADM

## 2024-08-20 RX ORDER — MICONAZOLE NITRATE 20 MG/G
CREAM TOPICAL 2 TIMES DAILY
Status: DISCONTINUED | OUTPATIENT
Start: 2024-08-20 | End: 2024-08-26 | Stop reason: HOSPADM

## 2024-08-20 RX ORDER — VITAMIN B COMPLEX
1000 TABLET ORAL DAILY
Status: DISCONTINUED | OUTPATIENT
Start: 2024-08-20 | End: 2024-08-26 | Stop reason: HOSPADM

## 2024-08-20 RX ORDER — HYDROXYZINE HYDROCHLORIDE 25 MG/1
25 TABLET, FILM COATED ORAL 3 TIMES DAILY PRN
Status: DISCONTINUED | OUTPATIENT
Start: 2024-08-20 | End: 2024-08-26 | Stop reason: HOSPADM

## 2024-08-20 RX ORDER — CASTOR OIL AND BALSAM, PERU 788; 87 MG/G; MG/G
87 OINTMENT TOPICAL 2 TIMES DAILY
Status: DISCONTINUED | OUTPATIENT
Start: 2024-08-20 | End: 2024-08-26 | Stop reason: HOSPADM

## 2024-08-20 RX ORDER — BENZONATATE 100 MG/1
100 CAPSULE ORAL 3 TIMES DAILY PRN
Status: DISCONTINUED | OUTPATIENT
Start: 2024-08-20 | End: 2024-08-26 | Stop reason: HOSPADM

## 2024-08-20 RX ORDER — BUMETANIDE 0.25 MG/ML
1 INJECTION INTRAMUSCULAR; INTRAVENOUS DAILY
Status: DISCONTINUED | OUTPATIENT
Start: 2024-08-21 | End: 2024-08-22

## 2024-08-20 RX ORDER — LANOLIN ALCOHOL/MO/W.PET/CERES
400 CREAM (GRAM) TOPICAL 2 TIMES DAILY
Status: DISCONTINUED | OUTPATIENT
Start: 2024-08-20 | End: 2024-08-26 | Stop reason: HOSPADM

## 2024-08-20 RX ORDER — SIMETHICONE 80 MG
80 TABLET,CHEWABLE ORAL 4 TIMES DAILY PRN
Status: DISCONTINUED | OUTPATIENT
Start: 2024-08-20 | End: 2024-08-26 | Stop reason: HOSPADM

## 2024-08-20 RX ORDER — LOPERAMIDE HCL 2 MG
2 CAPSULE ORAL 4 TIMES DAILY PRN
Status: DISCONTINUED | OUTPATIENT
Start: 2024-08-20 | End: 2024-08-26 | Stop reason: HOSPADM

## 2024-08-20 RX ORDER — SODIUM CHLORIDE 0.9 % (FLUSH) 0.9 %
5-40 SYRINGE (ML) INJECTION EVERY 12 HOURS SCHEDULED
Status: DISCONTINUED | OUTPATIENT
Start: 2024-08-20 | End: 2024-08-26 | Stop reason: HOSPADM

## 2024-08-20 RX ORDER — ONDANSETRON 4 MG/1
4 TABLET, ORALLY DISINTEGRATING ORAL EVERY 8 HOURS PRN
Status: DISCONTINUED | OUTPATIENT
Start: 2024-08-20 | End: 2024-08-26 | Stop reason: HOSPADM

## 2024-08-20 RX ORDER — ASPIRIN 81 MG/1
81 TABLET, CHEWABLE ORAL DAILY
Status: DISCONTINUED | OUTPATIENT
Start: 2024-08-20 | End: 2024-08-26 | Stop reason: HOSPADM

## 2024-08-20 RX ORDER — ALBUTEROL SULFATE 90 UG/1
1 AEROSOL, METERED RESPIRATORY (INHALATION) EVERY 4 HOURS PRN
Status: DISCONTINUED | OUTPATIENT
Start: 2024-08-20 | End: 2024-08-23

## 2024-08-20 RX ORDER — LANOLIN ALCOHOL/MO/W.PET/CERES
4.5 CREAM (GRAM) TOPICAL NIGHTLY
Status: DISCONTINUED | OUTPATIENT
Start: 2024-08-20 | End: 2024-08-26 | Stop reason: HOSPADM

## 2024-08-20 RX ADMIN — RENO CAPS 1 MG: 100; 1.5; 1.7; 20; 10; 1; 150; 5; 6 CAPSULE ORAL at 09:19

## 2024-08-20 RX ADMIN — SODIUM CHLORIDE 1000 MG: 900 INJECTION INTRAVENOUS at 15:43

## 2024-08-20 RX ADMIN — ISOSORBIDE MONONITRATE 60 MG: 30 TABLET, EXTENDED RELEASE ORAL at 09:19

## 2024-08-20 RX ADMIN — Medication 1000 UNITS: at 09:20

## 2024-08-20 RX ADMIN — ENOXAPARIN SODIUM 30 MG: 100 INJECTION SUBCUTANEOUS at 09:21

## 2024-08-20 RX ADMIN — BUMETANIDE 1 MG: 0.25 INJECTION INTRAMUSCULAR; INTRAVENOUS at 00:53

## 2024-08-20 RX ADMIN — QUETIAPINE FUMARATE 25 MG: 25 TABLET ORAL at 16:49

## 2024-08-20 RX ADMIN — BUMETANIDE 1 MG: 0.25 INJECTION INTRAMUSCULAR; INTRAVENOUS at 09:20

## 2024-08-20 RX ADMIN — Medication 400 MG: at 20:59

## 2024-08-20 RX ADMIN — SODIUM CHLORIDE, PRESERVATIVE FREE 10 ML: 5 INJECTION INTRAVENOUS at 09:26

## 2024-08-20 RX ADMIN — CARVEDILOL 25 MG: 12.5 TABLET, FILM COATED ORAL at 16:49

## 2024-08-20 RX ADMIN — Medication 0.09 G: at 09:21

## 2024-08-20 RX ADMIN — ASPIRIN 81 MG: 81 TABLET, CHEWABLE ORAL at 09:19

## 2024-08-20 RX ADMIN — ARFORMOTEROL TARTRATE: 15 SOLUTION RESPIRATORY (INHALATION) at 09:40

## 2024-08-20 RX ADMIN — CARVEDILOL 25 MG: 12.5 TABLET, FILM COATED ORAL at 09:20

## 2024-08-20 RX ADMIN — MELATONIN 4.5 MG: at 21:13

## 2024-08-20 RX ADMIN — ACETAMINOPHEN 650 MG: 325 TABLET ORAL at 16:46

## 2024-08-20 RX ADMIN — ENOXAPARIN SODIUM 30 MG: 100 INJECTION SUBCUTANEOUS at 21:02

## 2024-08-20 RX ADMIN — ACETAMINOPHEN 650 MG: 325 TABLET ORAL at 10:48

## 2024-08-20 RX ADMIN — FERROUS SULFATE TAB 325 MG (65 MG ELEMENTAL FE) 325 MG: 325 (65 FE) TAB at 09:20

## 2024-08-20 RX ADMIN — ARFORMOTEROL TARTRATE: 15 SOLUTION RESPIRATORY (INHALATION) at 18:04

## 2024-08-20 RX ADMIN — PREGABALIN 150 MG: 75 CAPSULE ORAL at 09:20

## 2024-08-20 RX ADMIN — AMLODIPINE BESYLATE 5 MG: 5 TABLET ORAL at 15:39

## 2024-08-20 RX ADMIN — Medication 400 MG: at 09:19

## 2024-08-20 RX ADMIN — POTASSIUM BICARBONATE 40 MEQ: 782 TABLET, EFFERVESCENT ORAL at 09:22

## 2024-08-20 RX ADMIN — SODIUM CHLORIDE, PRESERVATIVE FREE 10 ML: 5 INJECTION INTRAVENOUS at 20:59

## 2024-08-20 RX ADMIN — POLYETHYLENE GLYCOL 3350 17 G: 17 POWDER, FOR SOLUTION ORAL at 09:22

## 2024-08-20 ASSESSMENT — PAIN SCALES - GENERAL
PAINLEVEL_OUTOF10: 2
PAINLEVEL_OUTOF10: 6
PAINLEVEL_OUTOF10: 6
PAINLEVEL_OUTOF10: 3

## 2024-08-20 ASSESSMENT — PAIN DESCRIPTION - ORIENTATION: ORIENTATION: MID

## 2024-08-20 ASSESSMENT — PAIN DESCRIPTION - LOCATION
LOCATION: NECK

## 2024-08-20 ASSESSMENT — PAIN DESCRIPTION - DESCRIPTORS
DESCRIPTORS: JABBING;NAGGING
DESCRIPTORS: JABBING

## 2024-08-20 ASSESSMENT — PAIN - FUNCTIONAL ASSESSMENT: PAIN_FUNCTIONAL_ASSESSMENT: ACTIVITIES ARE NOT PREVENTED

## 2024-08-20 NOTE — ED PROVIDER NOTES
[facility] CARDIOPULMONARY CARE  EMERGENCY DEPARTMENT ENCOUNTER       Pt Name: Marilee Oakes  MRN: [id number]  Birthdate 1950  Date of evaluation: 8/19/2024  Provider: Mat Chavez DO   PCP: Bel Pereira APRN - CNP  Note Started: 10:00 PM EDT 8/19/24     CHIEF COMPLAINT       Chief Complaint   Patient presents with    Urinary Frequency     D/c home yesterday for UTI, states feels its no better        HISTORY OF PRESENT ILLNESS: 1 or more elements      History From: Patient, History limited by: none     Marilee Oakes is a 73 y.o. female presenting the emergency department with complaints of urinary frequency.  She does complain of shortness of breath.  She was discharged yesterday after being hospitalized with encephalopathy and a UTI.       Please See MDM for Additional Details of the HPI/PMH  Nursing Notes were all reviewed and agreed with or any disagreements were addressed in the HPI.     REVIEW OF SYSTEMS        Positives and Pertinent negatives as per HPI.    PAST HISTORY     Past Medical History:  Past Medical History:   Diagnosis Date    Acute and chronic respiratory failure with hypoxia (HCC)     Age-related osteoporosis without current pathological fracture     Age-related osteoporosis without current pathological fracture     Anemia in chronic kidney disease     Anxiety and depression 01/22/2018    Arthritis     OA    Asthma     UNCOMPLICATED    Atherosclerotic heart disease of native coronary artery with angina pectoris (HCC)     CAD (coronary artery disease)     Chronic systolic heart failure (HCC)     CKD stage G3b/A1, GFR 30-44 and albumin creatinine ratio <30 mg/g (HCC)     STAGE 3B    COPD (chronic obstructive pulmonary disease) (HCC)     WITH (ACUTE) EXACERATION    Dependence on renal dialysis (HCC)     Dependence on supplemental oxygen     Difficulty in walking, not elsewhere classified     Essential hypertension     GERD (gastroesophageal reflux disease)     Hemorrhoids     PAIN

## 2024-08-20 NOTE — ED NOTES
Verbal (telephone) report given to Dori (oncoming nurse) by Lisa (offgoing nurse). Report included the following information Nurse Handoff Report, ED Encounter Summary, ED SBAR, Adult Overview, Intake/Output, MAR, Recent Results, and Neuro Assessment, outstanding orders to be completed. Opportunity for questions provided

## 2024-08-20 NOTE — ED NOTES
Bedside and Verbal shift change report given to AGNES Gonzalez (oncoming nurse) by AGNES Solis (offgoing nurse). Report included the following information Nurse Handoff Report and ED Encounter Summary.

## 2024-08-20 NOTE — H&P
Hospitalist Admission Note    NAME:   Marilee Oakes   : 1950   MRN: [id number]     Date/Time: 2024 1:48 PM    Patient PCP: Bel Pereira APRN - CNP    ______________________________________________________________________  Given the patient's current clinical presentation, I have a high level of concern for decompensation if discharged from the emergency department.  Complex decision making was performed, which includes reviewing the patient's available past medical records, laboratory results, and x-ray films.       My assessment of this patient's clinical condition and my plan of care is as follows.    Assessment / Plan:    Chest pain:  Patient denied chest pain to me  Troponin 39  Will repeat troponin    UTI:  Diagnosed on last admission, was discharged on levofloxacin  Continue ceftriaxone while he is here in the hospital, can be discharged on levofloxacin      Acute on chronic diastolic heart failure  S/p bumex 1 mg iv in ED  Continue bumex 1 mg iv daily    Acute hypokalemia:  Will replete potassium    Anxiety/agitation:  Hydroxyzine prn    Hx of delirium/sundowning  Start seroquel 25 mg po every evening      COPD  Chronic hypoxic respiratory failure on 2-3L nasal cannula  Not on exacerbation    Hypertension  CAD  Hyperlipidemia  EF 55-60%  Continue amlodipine, coreg, aspirin, imdur.    Intertrigo:  Continue miconazole    GERD:  Continue PPI      Impaired skin integrity      Recent left ankle surgery complicated by MRSA infection  - LLE boot and dressing  - patient previously seen at wound clinic  - follow by Dr. Chan wound care as outpatien              Medical Decision Making:   I personally reviewed labs: cbc, BMP: low potassium- will replete, creatinine stable at 1.7  BNP: 9930  Troponin 39    I personally reviewed imaging:CXR report  I personally reviewed EKG:  Toxic drug monitoring: will monitor creatinine and electrolytes while patient is getting iv diuresis.   Discussed case  (65 Fe) MG tablet Take 1 tablet by mouth daily (with breakfast) 7/8/24   Bel Pereira APRN - CNP   amLODIPine (NORVASC) 5 MG tablet Take 1 tablet by mouth daily 7/8/24   Bel Pereira APRN - CNP   carvedilol (COREG) 25 MG tablet Take 1 tablet by mouth with breakfast and with evening meal 7/8/24   Bel Pereira APRN - CNP   isosorbide mononitrate (IMDUR) 60 MG extended release tablet Take 1 tablet by mouth daily 7/8/24 8/7/24  Bel Pereira APRN - CNP   fluticasone-salmeterol (ADVAIR) 250-50 MCG/ACT AEPB diskus inhaler Inhale 1 puff into the lungs in the morning and 1 puff in the evening. 7/8/24 8/7/24  Bel Pereira APRN - CNP   polyethylene glycol (GLYCOLAX) 17 GM/SCOOP powder Take 17 g by mouth daily 7/8/24 8/22/24  Bel Pereira APRN - CNP   loperamide (IMODIUM) 2 MG capsule Take 1 capsule by mouth 4 times daily as needed for Diarrhea 7/8/24   Bel Pereira APRN - CNP   Melatonin 5 MG CAPS Take 1 tablet by mouth at bedtime 7/8/24   Bel Pereira APRN - CNP   magnesium oxide (MAG-OX) 400 MG tablet Take 1 tablet by mouth 2 times daily 7/8/24   Bel Pereira APRN - CNP   sodium chloride (OCEAN) 0.65 % nasal spray 2 sprays by Nasal route every 8 hours as needed for Congestion 7/8/24   Bel Pereira APRN - CNP   omeprazole (PRILOSEC) 40 MG delayed release capsule Take 1 capsule by mouth daily 7/8/24 8/7/24  Bel Pereira APRN - CNP   B Complex-C-Folic Acid (RENAL MULTIVITAMIN FORMULA) TABS Take 1 tablet by mouth daily 7/8/24 8/7/24  Bel Pereira APRN - CNP   vitamin D 25 MCG (1000 UT) CAPS Take 1 capsule by mouth daily 7/8/24   Bel Pereira APRN - CNP   triamcinolone (KENALOG) 0.1 % cream Apply topically 2 times daily. 7/8/24   Bel Pereira, APRN - CNP   albuterol sulfate HFA (PROVENTIL;VENTOLIN;PROAIR) 108 (90 Base) MCG/ACT inhaler Inhale 1 puff into the lungs every 4 hours as needed for Shortness of Breath 7/8/24

## 2024-08-20 NOTE — ED NOTES
Called patient's  to provide an update. Opportunity for questions provide and those questions were answered. No further questions at conversation end.

## 2024-08-20 NOTE — PROGRESS NOTES
End of Shift Note    Bedside shift change report given to RN (oncoming nurse) by Dori Tierney RN (offgoing nurse).  Report included the following information SBAR    Shift worked:  6554-9056     Shift summary and any significant changes:     Increased agitation at 1440.  MD added 25 mg of Seroquel. Behavior improved. Patient sleeping     Concerns for physician to address:  Patient appears oversedated after 25 mg of Seroquel     Zone phone for oncoming shift:              Dori Tierney RN

## 2024-08-20 NOTE — ED NOTES
Patient's  called for update, provided opportunities for him to ask questions, answered those questions no further questions at time of conversation's end.

## 2024-08-20 NOTE — ED NOTES
Assumed care of pt at this time, report received from AGNES Solis. Patient awake, sitting in stretcher. Patient alert, oriented, x2, shouting. Disoriented to situation and place. Patient redirectable. Provided with water per request.

## 2024-08-21 LAB
ANION GAP SERPL CALC-SCNC: 3 MMOL/L (ref 5–15)
BASOPHILS # BLD: 0 K/UL (ref 0–0.1)
BASOPHILS NFR BLD: 0 % (ref 0–1)
BUN SERPL-MCNC: 17 MG/DL (ref 6–20)
BUN/CREAT SERPL: 10 (ref 12–20)
CALCIUM SERPL-MCNC: 8.8 MG/DL (ref 8.5–10.1)
CHLORIDE SERPL-SCNC: 109 MMOL/L (ref 97–108)
CO2 SERPL-SCNC: 34 MMOL/L (ref 21–32)
CREAT SERPL-MCNC: 1.72 MG/DL (ref 0.55–1.02)
DIFFERENTIAL METHOD BLD: ABNORMAL
EOSINOPHIL # BLD: 0.4 K/UL (ref 0–0.4)
EOSINOPHIL NFR BLD: 7 % (ref 0–7)
ERYTHROCYTE [DISTWIDTH] IN BLOOD BY AUTOMATED COUNT: 19.4 % (ref 11.5–14.5)
GLUCOSE BLD STRIP.AUTO-MCNC: 138 MG/DL (ref 65–117)
GLUCOSE BLD STRIP.AUTO-MCNC: 161 MG/DL (ref 65–117)
GLUCOSE BLD STRIP.AUTO-MCNC: 249 MG/DL (ref 65–117)
GLUCOSE BLD STRIP.AUTO-MCNC: 253 MG/DL (ref 65–117)
GLUCOSE BLD STRIP.AUTO-MCNC: 92 MG/DL (ref 65–117)
GLUCOSE SERPL-MCNC: 173 MG/DL (ref 65–100)
HCT VFR BLD AUTO: 34.9 % (ref 35–47)
HGB BLD-MCNC: 9.8 G/DL (ref 11.5–16)
IMM GRANULOCYTES # BLD AUTO: 0 K/UL (ref 0–0.04)
IMM GRANULOCYTES NFR BLD AUTO: 1 % (ref 0–0.5)
LYMPHOCYTES # BLD: 0.7 K/UL (ref 0.8–3.5)
LYMPHOCYTES NFR BLD: 11 % (ref 12–49)
MCH RBC QN AUTO: 25.6 PG (ref 26–34)
MCHC RBC AUTO-ENTMCNC: 28.1 G/DL (ref 30–36.5)
MCV RBC AUTO: 91.1 FL (ref 80–99)
MONOCYTES # BLD: 0.5 K/UL (ref 0–1)
MONOCYTES NFR BLD: 7 % (ref 5–13)
NEUTS SEG # BLD: 4.6 K/UL (ref 1.8–8)
NEUTS SEG NFR BLD: 74 % (ref 32–75)
NRBC # BLD: 0 K/UL (ref 0–0.01)
NRBC BLD-RTO: 0 PER 100 WBC
PHOSPHATE SERPL-MCNC: 2.8 MG/DL (ref 2.6–4.7)
PLATELET # BLD AUTO: 180 K/UL (ref 150–400)
PMV BLD AUTO: 11.9 FL (ref 8.9–12.9)
POTASSIUM SERPL-SCNC: 3.9 MMOL/L (ref 3.5–5.1)
RBC # BLD AUTO: 3.83 M/UL (ref 3.8–5.2)
SERVICE CMNT-IMP: ABNORMAL
SERVICE CMNT-IMP: NORMAL
SODIUM SERPL-SCNC: 146 MMOL/L (ref 136–145)
WBC # BLD AUTO: 6.2 K/UL (ref 3.6–11)

## 2024-08-21 PROCEDURE — 97530 THERAPEUTIC ACTIVITIES: CPT

## 2024-08-21 PROCEDURE — 2580000003 HC RX 258: Performed by: STUDENT IN AN ORGANIZED HEALTH CARE EDUCATION/TRAINING PROGRAM

## 2024-08-21 PROCEDURE — 36415 COLL VENOUS BLD VENIPUNCTURE: CPT

## 2024-08-21 PROCEDURE — 97162 PT EVAL MOD COMPLEX 30 MIN: CPT

## 2024-08-21 PROCEDURE — 6360000002 HC RX W HCPCS: Performed by: STUDENT IN AN ORGANIZED HEALTH CARE EDUCATION/TRAINING PROGRAM

## 2024-08-21 PROCEDURE — 94640 AIRWAY INHALATION TREATMENT: CPT

## 2024-08-21 PROCEDURE — 1100000003 HC PRIVATE W/ TELEMETRY

## 2024-08-21 PROCEDURE — 97535 SELF CARE MNGMENT TRAINING: CPT

## 2024-08-21 PROCEDURE — 82962 GLUCOSE BLOOD TEST: CPT

## 2024-08-21 PROCEDURE — 6370000000 HC RX 637 (ALT 250 FOR IP): Performed by: INTERNAL MEDICINE

## 2024-08-21 PROCEDURE — 97166 OT EVAL MOD COMPLEX 45 MIN: CPT

## 2024-08-21 PROCEDURE — 6360000002 HC RX W HCPCS: Performed by: INTERNAL MEDICINE

## 2024-08-21 PROCEDURE — 85025 COMPLETE CBC W/AUTO DIFF WBC: CPT

## 2024-08-21 PROCEDURE — 2580000003 HC RX 258: Performed by: INTERNAL MEDICINE

## 2024-08-21 PROCEDURE — 84100 ASSAY OF PHOSPHORUS: CPT

## 2024-08-21 PROCEDURE — 76937 US GUIDE VASCULAR ACCESS: CPT

## 2024-08-21 PROCEDURE — 80048 BASIC METABOLIC PNL TOTAL CA: CPT

## 2024-08-21 PROCEDURE — 2700000000 HC OXYGEN THERAPY PER DAY

## 2024-08-21 RX ORDER — DEXTROSE MONOHYDRATE 100 MG/ML
INJECTION, SOLUTION INTRAVENOUS CONTINUOUS PRN
Status: DISCONTINUED | OUTPATIENT
Start: 2024-08-21 | End: 2024-08-26 | Stop reason: HOSPADM

## 2024-08-21 RX ORDER — GLUCAGON 1 MG/ML
1 KIT INJECTION PRN
Status: DISCONTINUED | OUTPATIENT
Start: 2024-08-21 | End: 2024-08-26 | Stop reason: HOSPADM

## 2024-08-21 RX ORDER — INSULIN LISPRO 100 [IU]/ML
0-4 INJECTION, SOLUTION INTRAVENOUS; SUBCUTANEOUS NIGHTLY
Status: DISCONTINUED | OUTPATIENT
Start: 2024-08-21 | End: 2024-08-26 | Stop reason: HOSPADM

## 2024-08-21 RX ORDER — INSULIN LISPRO 100 [IU]/ML
0-8 INJECTION, SOLUTION INTRAVENOUS; SUBCUTANEOUS
Status: DISCONTINUED | OUTPATIENT
Start: 2024-08-21 | End: 2024-08-26 | Stop reason: HOSPADM

## 2024-08-21 RX ADMIN — SODIUM CHLORIDE, PRESERVATIVE FREE 10 ML: 5 INJECTION INTRAVENOUS at 22:14

## 2024-08-21 RX ADMIN — Medication 0.09 G: at 10:35

## 2024-08-21 RX ADMIN — SODIUM CHLORIDE, PRESERVATIVE FREE 10 ML: 5 INJECTION INTRAVENOUS at 10:35

## 2024-08-21 RX ADMIN — PANTOPRAZOLE SODIUM 40 MG: 40 TABLET, DELAYED RELEASE ORAL at 08:48

## 2024-08-21 RX ADMIN — HYDROXYZINE HYDROCHLORIDE 25 MG: 25 TABLET ORAL at 23:46

## 2024-08-21 RX ADMIN — TRIAMCINOLONE ACETONIDE: 1 CREAM TOPICAL at 22:13

## 2024-08-21 RX ADMIN — Medication 0.09 G: at 01:06

## 2024-08-21 RX ADMIN — MICONAZOLE NITRATE: 20 CREAM TOPICAL at 22:14

## 2024-08-21 RX ADMIN — CARVEDILOL 25 MG: 12.5 TABLET, FILM COATED ORAL at 17:19

## 2024-08-21 RX ADMIN — TRIAMCINOLONE ACETONIDE: 1 CREAM TOPICAL at 10:34

## 2024-08-21 RX ADMIN — RENO CAPS 1 MG: 100; 1.5; 1.7; 20; 10; 1; 150; 5; 6 CAPSULE ORAL at 08:48

## 2024-08-21 RX ADMIN — ASPIRIN 81 MG: 81 TABLET, CHEWABLE ORAL at 08:48

## 2024-08-21 RX ADMIN — BUMETANIDE 1 MG: 0.25 INJECTION INTRAMUSCULAR; INTRAVENOUS at 08:49

## 2024-08-21 RX ADMIN — MICONAZOLE NITRATE: 20 CREAM TOPICAL at 01:07

## 2024-08-21 RX ADMIN — MICONAZOLE NITRATE: 20 CREAM TOPICAL at 10:33

## 2024-08-21 RX ADMIN — ENOXAPARIN SODIUM 30 MG: 100 INJECTION SUBCUTANEOUS at 21:59

## 2024-08-21 RX ADMIN — Medication 1000 UNITS: at 08:49

## 2024-08-21 RX ADMIN — PREGABALIN 150 MG: 75 CAPSULE ORAL at 08:48

## 2024-08-21 RX ADMIN — QUETIAPINE FUMARATE 25 MG: 25 TABLET ORAL at 17:17

## 2024-08-21 RX ADMIN — Medication 400 MG: at 22:00

## 2024-08-21 RX ADMIN — FERROUS SULFATE TAB 325 MG (65 MG ELEMENTAL FE) 325 MG: 325 (65 FE) TAB at 08:48

## 2024-08-21 RX ADMIN — WATER 5 MG: 1 INJECTION INTRAMUSCULAR; INTRAVENOUS; SUBCUTANEOUS at 05:40

## 2024-08-21 RX ADMIN — Medication 0.09 G: at 22:15

## 2024-08-21 RX ADMIN — CARVEDILOL 25 MG: 12.5 TABLET, FILM COATED ORAL at 08:48

## 2024-08-21 RX ADMIN — ISOSORBIDE MONONITRATE 60 MG: 30 TABLET, EXTENDED RELEASE ORAL at 08:48

## 2024-08-21 RX ADMIN — AMLODIPINE BESYLATE 5 MG: 5 TABLET ORAL at 08:48

## 2024-08-21 RX ADMIN — ACETAMINOPHEN 650 MG: 325 TABLET ORAL at 23:46

## 2024-08-21 RX ADMIN — Medication 400 MG: at 08:48

## 2024-08-21 RX ADMIN — ARFORMOTEROL TARTRATE: 15 SOLUTION RESPIRATORY (INHALATION) at 07:43

## 2024-08-21 RX ADMIN — ENOXAPARIN SODIUM 30 MG: 100 INJECTION SUBCUTANEOUS at 08:49

## 2024-08-21 RX ADMIN — SODIUM CHLORIDE 1000 MG: 900 INJECTION INTRAVENOUS at 13:27

## 2024-08-21 RX ADMIN — POLYETHYLENE GLYCOL 3350 17 G: 17 POWDER, FOR SOLUTION ORAL at 08:49

## 2024-08-21 RX ADMIN — MELATONIN 4.5 MG: at 21:59

## 2024-08-21 RX ADMIN — TRIAMCINOLONE ACETONIDE: 1 CREAM TOPICAL at 01:07

## 2024-08-21 ASSESSMENT — PAIN DESCRIPTION - LOCATION: LOCATION: ANKLE

## 2024-08-21 ASSESSMENT — PAIN SCALES - GENERAL: PAINLEVEL_OUTOF10: 3

## 2024-08-21 ASSESSMENT — PAIN DESCRIPTION - ORIENTATION: ORIENTATION: LEFT

## 2024-08-21 ASSESSMENT — PAIN DESCRIPTION - DESCRIPTORS: DESCRIPTORS: ACHING

## 2024-08-21 NOTE — PROGRESS NOTES
End of Shift Note    Bedside shift change report given to RN (oncoming nurse) by Dori Tierney RN (offgoing nurse).  Report included the following information SBAR    Shift worked:  3851-6838     Shift summary and any significant changes:     Patient noted pain on sole of left foot. RN unwrapped foot to reveal a pink soft lump. RN Notified MD and requested wound care consult.  Wound care orders were placed from consult three days ago (8/16), but the wound is new and not present in these pictures. Work order requested for [place] phone, none available on the unit so unable to take a picture of the new wound.   Concerns for physician to address:  New wound     Zone phone for oncoming shift:                Dori Tierney RN

## 2024-08-21 NOTE — BSMART NOTE
observed by pt's report.  Panic is not observed. Phobias are not observed.  Obsessive compulsive tendencies are not observed.      Section III - Mental Status Exam  The patient's appearance shows no evidence of impairment.  The patient's behavior shows no evidence of impairment. The patient is oriented to time, place, person and situation.  The patient's speech shows no evidence of impairment.  The patient's mood is euthymic.  The range of affect shows no evidence of impairment.  The patient's thought content demonstrates no evidence of impairment.  The thought process shows no evidence of impairment.  The patient's perception shows no evidence of impairment. The patient's memory shows no evidence of impairment.  The patient's appetite shows no evidence of impairment.  The patient's sleep shows no evidence of impairment. The patient's insight shows no evidence of impairment.  The patient's judgement shows no evidence of impairment.      The patient has demonstrated mental capacity to provide informed consent.    Section IV - Substance Abuse  The patient is not using substances. UDS result: unk BAL: unk    Section V - Medical  Past Medical History:   Diagnosis Date    Acute and chronic respiratory failure with hypoxia ([facility])     Age-related osteoporosis without current pathological fracture     Age-related osteoporosis without current pathological fracture     Anemia in chronic kidney disease     Anxiety and depression 01/22/2018    Arthritis     OA    Asthma     UNCOMPLICATED    Atherosclerotic heart disease of native coronary artery with angina pectoris ([facility])     CAD (coronary artery disease)     Chronic systolic heart failure ([facility])     CKD stage G3b/A1, GFR 30-44 and albumin creatinine ratio <30 mg/g ([facility])     STAGE 3B    COPD (chronic obstructive pulmonary disease) ([facility])     WITH (ACUTE) EXACERATION    Dependence on renal dialysis ([facility])     Dependence on supplemental oxygen     Difficulty in walking, not elsewhere  classified     Essential hypertension     GERD (gastroesophageal reflux disease)     Hemorrhoids     PAIN    History of diverticulitis     diverticulitis    History of echocardiogram 11/2021    LV: Estimated LVEF is 40 - 45%. Visually measured ejection fraction. Normal cavity size and wall thickness. Globally reduced systolic function.  LA: Moderately dilated left atrium. Left atrial appendage is completely occluded. A Watchman left atrial appendage occluder is present and completely occludes the appendage.    History of migraine     History of MRSA infection     Hypercholesterolemia 01/22/2018    Hypertensive heart and chronic kidney disease with heart failure and stage 1 through stage 4 chronic kidney disease, or unspecified chronic kidney disease (HCC)     Immunodeficiency, unspecified (HCC)     Irritable bowel syndrome     IBS, spinal stenosis    Long term (current) use of insulin (HCC)     Long-term use of high-risk medication 01/22/2018    Lumbar spinal stenosis     Metabolic encephalopathy     Morbid (severe) obesity due to excess calories ([facility])     Muscle weakness (generalized)     Neuropathy     Obesity (BMI 30-39.9) 04/30/2019    Obstructive sleep apnea     uses CPAP    Permanent atrial fibrillation (HCC)     Presence of implantable cardioverter-defibrillator (ICD)     Presence of other cardiac implants and grafts     PACEMAKER    Presence of Watchman left atrial appendage closure device     Primary generalized (osteo)arthritis     Spinal stenosis, lumbar region, without neurogenic claudication     Thrombocytopenia (HCC)     Type 2 diabetes mellitus with diabetic neuropathy, without long-term current use of insulin (HCC) 06/05/2016    Venous insufficiency     Vitamin B12 deficiency        Section VI - Living Arrangements  The patient .  The patient lives spouse. The patient has 2 children, ages adult . The patient does plan to return home upon discharge. The patient's source of income comes from

## 2024-08-21 NOTE — PROGRESS NOTES
SAMANTHA Troy MD     Procedures: see electronic medical records for all procedures/Xrays and details which were not copied into this note but were reviewed prior to creation of Plan.      LABS:  I reviewed today's most current labs and imaging studies.  Pertinent labs include:  Recent Labs     08/19/24 2220 08/21/24  1031   WBC 8.3 6.2   HGB 10.7* 9.8*   HCT 38.0 34.9*    180     Recent Labs     08/19/24 2220 08/20/24  0803 08/21/24  1031   * 146* 146*   K 3.2* 3.2* 3.9   * 111* 109*   CO2 32 32 34*   GLUCOSE 138* 111* 173*   BUN 16 16 17   CREATININE 1.74* 1.71* 1.72*   CALCIUM 9.5 9.3 8.8   MG 1.9 1.7  --    PHOS  --   --  2.8   BILITOT 0.6  --   --    AST 18  --   --    ALT 14  --   --        Signed: Ricky Troy MD

## 2024-08-21 NOTE — PLAN OF CARE
Problem: Safety - Adult  Goal: Free from fall injury  Outcome: Progressing  Flowsheets (Taken 8/20/2024 1032 by Dori Kirby RN)  Free From Fall Injury:   Instruct family/caregiver on patient safety   Based on caregiver fall risk screen, instruct family/caregiver to ask for assistance with transferring infant if caregiver noted to have fall risk factors     Problem: Discharge Planning  Goal: Discharge to home or other facility with appropriate resources  Outcome: Progressing  Flowsheets (Taken 8/21/2024 0451)  Discharge to home or other facility with appropriate resources: Identify barriers to discharge with patient and caregiver     Problem: Pain  Goal: Verbalizes/displays adequate comfort level or baseline comfort level  Outcome: Progressing  Flowsheets (Taken 8/21/2024 0451)  Verbalizes/displays adequate comfort level or baseline comfort level:   Encourage patient to monitor pain and request assistance   Assess pain using appropriate pain scale     Problem: Skin/Tissue Integrity  Goal: Absence of new skin breakdown  Description: 1.  Monitor for areas of redness and/or skin breakdown  2.  Assess vascular access sites hourly  3.  Every 4-6 hours minimum:  Change oxygen saturation probe site  4.  Every 4-6 hours:  If on nasal continuous positive airway pressure, respiratory therapy assess nares and determine need for appliance change or resting period.  Outcome: Progressing     Problem: Respiratory - Adult  Goal: Achieves optimal ventilation and oxygenation  Outcome: Progressing  Flowsheets (Taken 8/21/2024 0451)  Achieves optimal ventilation and oxygenation:   Assess for changes in respiratory status   Assess for changes in mentation and behavior   Position to facilitate oxygenation and minimize respiratory effort   Oxygen supplementation based on oxygen saturation or arterial blood gases     Problem: Confusion  Goal: Confusion, delirium, dementia, or psychosis is improved or at baseline  Description:  INTERVENTIONS:  1. Assess for possible contributors to thought disturbance, including medications, impaired vision or hearing, underlying metabolic abnormalities, dehydration, psychiatric diagnoses, and notify attending LIP  2. [place] high risk fall precautions, as indicated  3. Provide frequent short contacts to provide reality reorientation, refocusing and direction  4. Decrease environmental stimuli, including noise as appropriate  5. Monitor and intervene to maintain adequate nutrition, hydration, elimination, sleep and activity  6. If unable to ensure safety without constant attention obtain sitter and review sitter guidelines with assigned personnel  7. Initiate Psychosocial CNS and Spiritual Care consult, as indicated  Outcome: Progressing  Flowsheets (Taken 8/21/2024 4185)  Effect of thought disturbance (confusion, delirium, dementia, or psychosis) are managed with adequate functional status:   Assess for contributors to thought disturbance, including medications, impaired vision or hearing, underlying metabolic abnormalities, dehydration, psychiatric diagnoses, notify LIP   [place] high risk fall precautions, as indicated   Provide frequent short contacts to provide reality reorientation, refocusing and direction   Decrease environmental stimuli, including noise as appropriate

## 2024-08-21 NOTE — CARE COORDINATION
Advance Care Planning     General Advance Care Planning (ACP) Conversation    Date of Conversation: 8/21/2024  Conducted with: Patient with Decision Making Capacity  Other persons present: Spouse      Healthcare Decision Maker:   Primary Decision Maker: VioletteWesley - Spouse - [phone]    Secondary Decision Maker: Shelia Malik - Child - [phone]       Content/Action Overview:  Has ACP document(s) on file - reflects the patient's care preferences  Reviewed DNR/DNI and patient elects Full Code (Attempt Resuscitation)        Length of Voluntary ACP Conversation in minutes:  <16 minutes (Non-Billable)    ENGLISH H HOSAY

## 2024-08-21 NOTE — PROGRESS NOTES
2058- Pt was lethargic/confused/agitated upon waking up. Pt kept saying  let's go, let's go\". Upon asking where pt want to go, pt did not answer. This RN held PO meds due to this reason. MD notified.    2122- Pt is screaming \" help, help\" with eyes closed.    2335- pt is more alert. Answered all questions. Gave the held PO meds.    0515- pt has a episode of confusion at this time. This nurse try to get the blood for labs but pt kept asking me to leave the room. Pt was resisting and tried to pull the IV. This nurse could not get labs done due to this reason.    0530- MD notified. Ordered Zyprexa 5 mg IM.

## 2024-08-21 NOTE — CARE COORDINATION
08/21/24 0920   Readmission Assessment   Number of Days since last admission? 1-7 days   Previous Disposition Home with Family   Who is being Interviewed Patient;Caregiver   What was the patient's/caregiver's perception as to why they think they needed to return back to the hospital? Other (Comment)  (chest pain, elevated blood pressure)   Did you visit your Primary Care Physician after you left the hospital, before you returned this time? No   Why weren't you able to visit your PCP? Other (Comment)  (too soon for appt.)   Did you see a specialist, such as Cardiac, Pulmonary, Orthopedic Physician, etc. after you left the hospital? No   Who advised the patient to return to the hospital? Self-referral   Does the patient report anything that got in the way of taking their medications? No   In our efforts to provide the best possible care to you and others like you, can you think of anything that we could have done to help you after you left the hospital the first time, so that you might not have needed to return so soon? Other (Comment)  (Continue with Miguel BARON)     English Melchor ALARCON CM   [phone]

## 2024-08-21 NOTE — PLAN OF CARE
Problem: Occupational Therapy - Adult  Goal: By Discharge: Performs self-care activities at highest level of function for planned discharge setting.  See evaluation for individualized goals.  Description: FUNCTIONAL STATUS PRIOR TO ADMISSION:  Patient was not ambulatory following L ankle fx and NWB status. Pt has been in and out of the hospital, recently dc home with  on 19th but admitted back at end of the day. Pt was getting help in all ADLs from her .     HOME SUPPORT: Patient lived with  for assistance.    Occupational Therapy Goals:  Initiated 8/21/2024  1.  Patient will perform grooming with Set-up seated EOB within 7 day(s).  2.  Patient will perform upper body dressing with Set-up seated EOB within 7 day(s).  3.  Patient will perform lower body dressing with Moderate Assist within 7 day(s).  4.  Patient will perform toilet transfers with Moderate Assist to BS  within 7 day(s).  5.  Patient will perform all aspects of toileting with Moderate Assist within 7 day(s).  6.  Patient will participate in upper extremity therapeutic exercise/activities with Minimal Assist for 15 minutes within 7 day(s).        Outcome: Progressing     OCCUPATIONAL THERAPY EVALUATION    Patient: Marilee Oakes (73 y.o. female)  Date: 8/21/2024  Primary Diagnosis: Acute on chronic diastolic heart failure (HCC) [I50.33]  Acute on chronic congestive heart failure, unspecified heart failure type (HCC) [I50.9]         Precautions:                    ASSESSMENT :  The patient is limited by decreased functional mobility, strength, activity tolerance, safety awareness, cognition, and WB status with continued ankle injury . The patient is AAOx2, disoriented to time, able to recall at end of the session. Overall min A x2 for functional mobility, pt drowsy and fatigued to stand, unable to maintain WB precautions today. Max to total A for LB dressing and min A to mod A for UB dressing. Recommend HHOT with  for    Grooming: Setup     UE Bathing: Minimal assistance     LE Bathing: Dependent/Total     UE Dressing: Minimal assistance     LE Dressing: Dependent/Total     Toileting: Dependent/Total    ADL Intervention and task modifications:    Patient progressed from supine in her bed to sitting EOB, pt initially drowsy and decreased orientation.   Bed mobility: min A x2 to initiate and complete activity. Cues for hand placement and scooting to EOB  Sitting balance: close SBA but no LOB    Provided a detailed explanation on importance of pacing, especially when moving to avoid falls due to hypotension. Encouraged \"pre\" activity in sitting and standing prior to moving onto the next position. Patient was able to demonstrate safety with movement in sitting and standing.    Orientation assistance with multiple choice for year and month. Pt was able to get on last attempt following two failed attempts.           [facility] AM-PACTM \"6 Clicks\"                                                       Daily Activity Inpatient Short Form  How much help from another person does the patient currently need... Total; A Lot A Little None   1.  Putting on and taking off regular lower body clothing? []  1 [x]  2 []  3 []  4   2.  Bathing (including washing, rinsing, drying)? []  1 [x]  2 []  3 []  4   3.  Toileting, which includes using toilet, bedpan or urinal? [] 1 [x]  2 []  3 []  4   4.  Putting on and taking off regular upper body clothing? []  1 []  2 [x]  3 []  4   5.  Taking care of personal grooming such as brushing teeth? []  1 []  2 [x]  3 []  4   6.  Eating meals? []  1 []  2 [x]  3 []  4   © 2007, Trustees of [facility], under license to [employer]. All rights reserved     Score: 15/24     Interpretation of Tool:  Represents clinically-significant functional categories (i.e. Activities of daily living).    Cutoff score 39.4 (19) correlates to a good likelihood of discharging home versus a facility  Mariela Mendez

## 2024-08-21 NOTE — CONSULTS
confusion/disorientation.     Thank your your consult. Please feel free to consult us again as needed.

## 2024-08-21 NOTE — CARE COORDINATION
CM Update: Referral placed for Medicaid application with [employer].  English Melchor ALARCON CM   [phone]

## 2024-08-21 NOTE — WOUND CARE
Wound care consulted again for this patient who is now readmitted. She was just seen 3 days ago for the same wounds that are post op wounds, some of which are now scabbed over. The lower leg / ankle wound is an post operative site that needs to remain dressed as ordered. The heels need to stay floated. Pt's orthopedic surgeon is Dr. Maame Chan from [facility].   Photo taken on 8/16:  Left heel healing PI:      Ankle / leg operative site      Healed steri-strip sites - now  scabbed:       Wound care for the foot and ankle wounds:  Cleanse the sites with the spray wound cleanser and wipe with gauze. Apply a xeroform gauze to the surgical sites and the heel. Cover with ABD pads and wrap with the small roll ismael. Tape and date the dressing. Keep the heels floated.   Nursing to do the wound care today.   Verónica Collins RN, BSN, CWON

## 2024-08-21 NOTE — CARE COORDINATION
Care Management Initial Assessment       RUR:34%  Readmission? Yes -     1st IM letter given? Yes -   1st  letter given: No     08/21/24 0907   Service Assessment   Patient Orientation Alert and Oriented   Cognition Alert   History Provided By Patient;Spouse   Primary Caregiver Spouse   Support Systems Spouse/Significant Other;Children   Patient's Healthcare Decision Maker is: Legal Next of Kin   PCP Verified by CM Yes   Last Visit to PCP Within last 3 months   Prior Functional Level Assistance with the following:;Bathing;Dressing;Toileting;Cooking;Housework;Shopping;Mobility   Current Functional Level Mobility;Shopping;Housework;Cooking;Toileting;Dressing;Bathing;Assistance with the following:   Can patient return to prior living arrangement Yes   Ability to make needs known: Good   Family able to assist with home care needs: Yes   Would you like for me to discuss the discharge plan with any other family members/significant others, and if so, who?   ()   Financial Resources Medicare   Social/Functional History   Lives With Spouse   Type of Home House   Home Access   (ramp)   Bathroom Toilet Standard   Bathroom Equipment Commode   Bathroom Accessibility Accessible   Home Equipment Adjustable bed;Walker - Standard;Wheelchair - Manual   Receives Help From Family   ADL Assistance Needs assistance   Ambulation Assistance Non-ambulatory  (foot surgery, not cleared for mobility on that foot.)   Transfer Assistance Needs assistance   Active  No   Occupation Retired   Discharge Planning   Type of Residence House   Living Arrangements Spouse/Significant Other   Current Services Prior To Admission Home Care   DME Ordered? No   Potential Assistance Purchasing Medications No   Type of Home Care Services OT;PT;Skilled Therapy   Patient expects to be discharged to: House   Services At/After Discharge   [place] Resource Information Provided? No     CM met with patient and spouse. She was just discharged from

## 2024-08-21 NOTE — PLAN OF CARE
Problem: Physical Therapy - Adult  Goal: By Discharge: Performs mobility at highest level of function for planned discharge setting.  See evaluation for individualized goals.  Description: FUNCTIONAL STATUS PRIOR TO ADMISSION: Patient was not ambulatory following L ankle fx and NWB status. Pt has been in and out of the hospital, recently dc home with  on 19th but admitted back after 5 hours. Pt was getting help in all ADLs from her .     HOME SUPPORT PRIOR TO ADMISSION: The patient lived alone with  to provide assistance.    Physical Therapy Goals  Initiated 8/21/2024  1.  Patient will move from supine to sit and sit to supine, scoot up and down, and roll side to side in bed with minimal assistance within 7 day(s).    2.  Patient will perform sit to stand with minimal assistance within 7 day(s).  3.  Patient will transfer from bed to chair and chair to bed with minimal assistance using the least restrictive device within 7 day(s).  4.  Patient will ambulate with minimal assistance for 5 feet with the least restrictive device within 7 day(s).   5.  Patient/caregiver will be independent with home exercise program within 7 day(s).   Outcome: Progressing     PHYSICAL THERAPY EVALUATION    Patient: Marilee Oakes (73 y.o. female)  Date: 8/21/2024  Primary Diagnosis: Acute on chronic diastolic heart failure (HCC) [I50.33]  Acute on chronic congestive heart failure, unspecified heart failure type (HCC) [I50.9]       Precautions: Restrictions/Precautions: Weight Bearing, Contact Precautions, Up as Tolerated, Fall Risk (MRSA; NWB LLE, CAM boot when OOB)   Lower Extremity Weight Bearing Restrictions  Left Lower Extremity Weight Bearing: Non Weight Bearing (with CAM boot)                  ASSESSMENT :   Pt seen for PT evaluation following readmission for chest pain after <1 day at home. Pt encountered semi-reclined in bed in NAD, cleared by RN to participate and agreeable to therapy.  Pt sleepy but  discussed with: occupational therapist and registered nurse    Patient Education  Education Given To: Patient;Family  Education Provided: Role of Therapy;Plan of Care;Precautions  Education Provided Comments: NWB precautions and use of Cam Boot on LLE  Education Method: Demonstration;Verbal;Teach Back  Barriers to Learning: Cognition  Education Outcome: Continued education needed;Verbalized understanding    Thank you for this referral.  ALEKSEY SALAZAR, PT  Minutes: 25      Physical Therapy Evaluation Charge Determination   History Examination Presentation Decision-Making   MEDIUM  Complexity : 1-2 comorbidities / personal factors will impact the outcome/ POC  MEDIUM Complexity : 3 Standardized tests and measures addressin body structure, function, activity limitation and / or participation in recreation  MEDIUM Complexity : Evolving with changing characteristics  AM-PAC  MEDIUM   Based on the above components, the patient evaluation is determined to be of the following complexity level: Medium

## 2024-08-22 ENCOUNTER — APPOINTMENT (OUTPATIENT)
Facility: HOSPITAL | Age: 74
DRG: 291 | End: 2024-08-22
Payer: MEDICARE

## 2024-08-22 LAB
ANION GAP SERPL CALC-SCNC: 5 MMOL/L (ref 5–15)
BUN SERPL-MCNC: 18 MG/DL (ref 6–20)
BUN/CREAT SERPL: 10 (ref 12–20)
CALCIUM SERPL-MCNC: 8.4 MG/DL (ref 8.5–10.1)
CHLORIDE SERPL-SCNC: 109 MMOL/L (ref 97–108)
CO2 SERPL-SCNC: 31 MMOL/L (ref 21–32)
CREAT SERPL-MCNC: 1.73 MG/DL (ref 0.55–1.02)
ECHO BSA: 2.33 M2
GLUCOSE BLD STRIP.AUTO-MCNC: 128 MG/DL (ref 65–117)
GLUCOSE BLD STRIP.AUTO-MCNC: 153 MG/DL (ref 65–117)
GLUCOSE BLD STRIP.AUTO-MCNC: 182 MG/DL (ref 65–117)
GLUCOSE BLD STRIP.AUTO-MCNC: 245 MG/DL (ref 65–117)
GLUCOSE SERPL-MCNC: 143 MG/DL (ref 65–100)
POTASSIUM SERPL-SCNC: 3.1 MMOL/L (ref 3.5–5.1)
SERVICE CMNT-IMP: ABNORMAL
SODIUM SERPL-SCNC: 145 MMOL/L (ref 136–145)

## 2024-08-22 PROCEDURE — 6360000002 HC RX W HCPCS: Performed by: STUDENT IN AN ORGANIZED HEALTH CARE EDUCATION/TRAINING PROGRAM

## 2024-08-22 PROCEDURE — 2580000003 HC RX 258: Performed by: INTERNAL MEDICINE

## 2024-08-22 PROCEDURE — 36415 COLL VENOUS BLD VENIPUNCTURE: CPT

## 2024-08-22 PROCEDURE — 93970 EXTREMITY STUDY: CPT

## 2024-08-22 PROCEDURE — 97535 SELF CARE MNGMENT TRAINING: CPT

## 2024-08-22 PROCEDURE — 6370000000 HC RX 637 (ALT 250 FOR IP): Performed by: INTERNAL MEDICINE

## 2024-08-22 PROCEDURE — 97530 THERAPEUTIC ACTIVITIES: CPT

## 2024-08-22 PROCEDURE — 6360000002 HC RX W HCPCS: Performed by: INTERNAL MEDICINE

## 2024-08-22 PROCEDURE — 80048 BASIC METABOLIC PNL TOTAL CA: CPT

## 2024-08-22 PROCEDURE — 94640 AIRWAY INHALATION TREATMENT: CPT

## 2024-08-22 PROCEDURE — 1100000003 HC PRIVATE W/ TELEMETRY

## 2024-08-22 PROCEDURE — 82962 GLUCOSE BLOOD TEST: CPT

## 2024-08-22 RX ORDER — LORAZEPAM 2 MG/ML
1 INJECTION INTRAMUSCULAR ONCE
Status: COMPLETED | OUTPATIENT
Start: 2024-08-22 | End: 2024-08-22

## 2024-08-22 RX ORDER — HALOPERIDOL 5 MG/ML
INJECTION INTRAMUSCULAR
Status: DISPENSED
Start: 2024-08-22 | End: 2024-08-23

## 2024-08-22 RX ORDER — ALPRAZOLAM 0.5 MG
0.5 TABLET ORAL 3 TIMES DAILY PRN
Status: DISCONTINUED | OUTPATIENT
Start: 2024-08-22 | End: 2024-08-26 | Stop reason: HOSPADM

## 2024-08-22 RX ORDER — BUMETANIDE 0.25 MG/ML
1 INJECTION INTRAMUSCULAR; INTRAVENOUS 2 TIMES DAILY
Status: DISCONTINUED | OUTPATIENT
Start: 2024-08-22 | End: 2024-08-26 | Stop reason: HOSPADM

## 2024-08-22 RX ORDER — HALOPERIDOL 5 MG/ML
1 INJECTION INTRAMUSCULAR EVERY 6 HOURS PRN
Status: DISCONTINUED | OUTPATIENT
Start: 2024-08-22 | End: 2024-08-26 | Stop reason: HOSPADM

## 2024-08-22 RX ADMIN — AMLODIPINE BESYLATE 5 MG: 5 TABLET ORAL at 08:24

## 2024-08-22 RX ADMIN — MELATONIN 4.5 MG: at 22:51

## 2024-08-22 RX ADMIN — PANTOPRAZOLE SODIUM 40 MG: 40 TABLET, DELAYED RELEASE ORAL at 06:45

## 2024-08-22 RX ADMIN — ISOSORBIDE MONONITRATE 60 MG: 30 TABLET, EXTENDED RELEASE ORAL at 08:24

## 2024-08-22 RX ADMIN — ARFORMOTEROL TARTRATE: 15 SOLUTION RESPIRATORY (INHALATION) at 20:44

## 2024-08-22 RX ADMIN — Medication 0.09 G: at 20:44

## 2024-08-22 RX ADMIN — Medication 1000 UNITS: at 08:24

## 2024-08-22 RX ADMIN — Medication 400 MG: at 08:24

## 2024-08-22 RX ADMIN — RENO CAPS 1 MG: 100; 1.5; 1.7; 20; 10; 1; 150; 5; 6 CAPSULE ORAL at 08:24

## 2024-08-22 RX ADMIN — BUMETANIDE 1 MG: 0.25 INJECTION INTRAMUSCULAR; INTRAVENOUS at 08:24

## 2024-08-22 RX ADMIN — SODIUM CHLORIDE, PRESERVATIVE FREE 10 ML: 5 INJECTION INTRAVENOUS at 21:29

## 2024-08-22 RX ADMIN — QUETIAPINE FUMARATE 25 MG: 25 TABLET ORAL at 18:31

## 2024-08-22 RX ADMIN — HYDROXYZINE HYDROCHLORIDE 25 MG: 25 TABLET ORAL at 19:20

## 2024-08-22 RX ADMIN — FERROUS SULFATE TAB 325 MG (65 MG ELEMENTAL FE) 325 MG: 325 (65 FE) TAB at 08:24

## 2024-08-22 RX ADMIN — INSULIN LISPRO 2 UNITS: 100 INJECTION, SOLUTION INTRAVENOUS; SUBCUTANEOUS at 08:23

## 2024-08-22 RX ADMIN — CARVEDILOL 25 MG: 12.5 TABLET, FILM COATED ORAL at 08:24

## 2024-08-22 RX ADMIN — POLYETHYLENE GLYCOL 3350 17 G: 17 POWDER, FOR SOLUTION ORAL at 08:23

## 2024-08-22 RX ADMIN — ENOXAPARIN SODIUM 30 MG: 100 INJECTION SUBCUTANEOUS at 21:23

## 2024-08-22 RX ADMIN — HALOPERIDOL LACTATE 1 MG: 5 INJECTION, SOLUTION INTRAMUSCULAR at 18:12

## 2024-08-22 RX ADMIN — TRIAMCINOLONE ACETONIDE: 1 CREAM TOPICAL at 20:45

## 2024-08-22 RX ADMIN — SODIUM CHLORIDE, PRESERVATIVE FREE 10 ML: 5 INJECTION INTRAVENOUS at 10:25

## 2024-08-22 RX ADMIN — TRIAMCINOLONE ACETONIDE: 1 CREAM TOPICAL at 11:55

## 2024-08-22 RX ADMIN — SODIUM CHLORIDE 1000 MG: 900 INJECTION INTRAVENOUS at 15:09

## 2024-08-22 RX ADMIN — POTASSIUM BICARBONATE 20 MEQ: 782 TABLET, EFFERVESCENT ORAL at 08:23

## 2024-08-22 RX ADMIN — ARFORMOTEROL TARTRATE: 15 SOLUTION RESPIRATORY (INHALATION) at 09:40

## 2024-08-22 RX ADMIN — Medication 0.09 G: at 10:24

## 2024-08-22 RX ADMIN — POTASSIUM BICARBONATE 40 MEQ: 782 TABLET, EFFERVESCENT ORAL at 11:55

## 2024-08-22 RX ADMIN — LORAZEPAM 1 MG: 2 INJECTION INTRAMUSCULAR; INTRAVENOUS at 18:20

## 2024-08-22 RX ADMIN — ASPIRIN 81 MG: 81 TABLET, CHEWABLE ORAL at 08:24

## 2024-08-22 RX ADMIN — PREGABALIN 150 MG: 75 CAPSULE ORAL at 08:24

## 2024-08-22 RX ADMIN — ENOXAPARIN SODIUM 30 MG: 100 INJECTION SUBCUTANEOUS at 08:24

## 2024-08-22 NOTE — PLAN OF CARE
Problem: Physical Therapy - Adult  Goal: By Discharge: Performs mobility at highest level of function for planned discharge setting.  See evaluation for individualized goals.  Description: FUNCTIONAL STATUS PRIOR TO ADMISSION: Patient was not ambulatory following L ankle fx and NWB status. Pt has been in and out of the hospital, recently dc home with  on 19th but admitted back after 5 hours. Pt was getting help in all ADLs from her .     HOME SUPPORT PRIOR TO ADMISSION: The patient lived alone with  to provide assistance.    Physical Therapy Goals  Initiated 8/21/2024  1.  Patient will move from supine to sit and sit to supine, scoot up and down, and roll side to side in bed with minimal assistance within 7 day(s).    2.  Patient will perform sit to stand with minimal assistance within 7 day(s).  3.  Patient will transfer from bed to chair and chair to bed with minimal assistance using the least restrictive device within 7 day(s).  4.  Patient will ambulate with minimal assistance for 5 feet with the least restrictive device within 7 day(s).   5.  Patient/caregiver will be independent with home exercise program within 7 day(s).   Outcome: Progressing     PHYSICAL THERAPY TREATMENT    Patient: Marilee Oakes (73 y.o. female)  Date: 8/22/2024  Diagnosis: Acute on chronic diastolic heart failure (HCC) [I50.33]  Acute on chronic congestive heart failure, unspecified heart failure type (HCC) [I50.9] Acute on chronic diastolic heart failure (HCC)      Precautions: Weight Bearing, Contact Precautions, Up as Tolerated, Fall Risk (MRSA; NWB LLE, CAM boot when OOB)   Left Lower Extremity Weight Bearing: Non Weight Bearing (with CAM boot)                  ASSESSMENT:  Patient continues to benefit from skilled PT services and is progressing towards goals. Pt encountered semi-reclined in bed in NAD, +open wound to sole of L foot but cleared by RN to participate and agreeable to therapy. Pt educated  regarding LLE NWB and maintaining LLE elevated off floor during mobility, verbalized understanding. Pt transferred semifowler > sit and scooted anteriorly with SBA. Right lateral trunk lean performed with SBA for slide board placement. Pt then scooted laterally on slideboard bed > chair with Min A x2 using roberto for increased lateral excursion. Once in chair using B arm rest, pt able to reposition in chair. Pt left semi-reclined in chair in NAD, all needs met.          PLAN:  Patient continues to benefit from skilled intervention to address the above impairments.  Continue treatment per established plan of care.        Recommendation for discharge: (in order for the patient to meet his/her long term goals): Therapy 2x a week in the home, however, may require supervision, cognitive and/or physical assistance due to the following concerns listed below: or Therapy up to 5 days/week in Skilled nursing facility    Other factors to consider for discharge: poor safety awareness, impaired cognition, high risk for falls, not safe to be alone, and concern for safely navigating or managing the home environment    IF patient discharges home will need the following DME: patient owns DME required for discharge       SUBJECTIVE:   Patient stated, \"I'm a little less sleepy today.\"    OBJECTIVE DATA SUMMARY:   Critical Behavior:  Orientation  Orientation Level: Oriented to place;Oriented to person;Disoriented to situation  Cognition  Arousal/Alertness: Appropriate responses to stimuli  Following Commands: Follows one step commands consistently  Attention Span: Appears intact  Problem Solving: Assistance required to generate solutions;Assistance required to implement solutions  Initiation: Requires cues for some  Sequencing: Requires cues for some    Functional Mobility Training:  Bed Mobility:  Bed Mobility Training  Bed Mobility Training: Yes  Overall Level of Assistance: Stand-by assistance  Rolling: Stand-by assistance  Supine to

## 2024-08-22 NOTE — PROGRESS NOTES
Hospitalist Progress Note    NAME:   Marilee Oakes   : 1950   MRN: [id number]     Date/Time: 2024 11:29 AM  Patient PCP: Bel Pereira APRN - CNP    Estimated discharge date:   Barriers: Volume status, CHF      Assessment / Plan:           UTI:  Diagnosed on last admission, was discharged on levofloxacin  Continue ceftriaxone while he is here in the hospital, can be discharged on levofloxacin        Acute on chronic diastolic heart failure  X-ray showed mild pulm edema, small to moderate bilateral pleural effusion  S/p bumex 1 mg iv in ED  Currently on 1 mg daily, changed to 1 mg IV twice daily  Daily weight     Acute hypokalemia:  Replaced,      Anxiety/agitation:  Hydroxyzine prn     Hx of delirium/sundowning  AVOID BENZOz,   Seroquel nightly          COPD  Chronic hypoxic respiratory failure on 2-3L nasal cannula  Not on exacerbation     Hypertension  CAD  Hyperlipidemia  EF 55-60%  Continue amlodipine, coreg, aspirin, imdur.     Intertrigo:  Continue miconazole     GERD:  Continue PPI        Impaired skin integrity      Recent left ankle surgery complicated by MRSA infection  - LLE boot and dressing  - patient previously seen at wound clinic  - follow by Dr. Chan wound care as outpatien  -Does not appear to be infected  Get Doppler of lower extremity to rule out DVT                 I personally reviewed imaging:CXR report  I personally reviewed EKG:  Toxic drug monitoring:   Discussed case with: RN, patient, IDR     Code Status: full  DVT Prophylaxis:lovenox   Baseline:        Subjective:     Chief Complaint / Reason for Physician Visit  Shortness of breath is improved, currently at baseline oxygen requirement.   at the bedside, answered all questions  Objective:     VITALS:   Last 24hrs VS reviewed since prior progress note. Most recent are:  Patient Vitals for the past 24 hrs:   BP Temp Temp src Pulse Resp SpO2   24 1039 -- 98.1 °F (36.7 °C) Oral -- -- --

## 2024-08-22 NOTE — PROGRESS NOTES
End of Shift Note    Bedside shift change report given to  (oncoming nurse) by iDgna Hair RN (offgoing nurse).  Report included the following information SBAR and MAR    Shift worked:  7a-7p     Shift summary and any significant changes:    Patient was pleasantly confused for most of the day, took an afternoon nap and woke up very agitated and combative.  Rapid response called.  IM haldol and IV ativan administered.  Sitter currently at bedside.      Concerns for physician to address: Patient needs IV meds for agitation.      Zone phone for oncoming shift:       Activity:     Number times ambulated in hallways past shift: 0  Number of times OOB to chair past shift: 1    Cardiac:   Cardiac Monitoring: Yes           Access:  Current line(s): PIV     Genitourinary:   Urinary status: incontinent    Respiratory:      Chronic home O2 use?: YES  Incentive spirometer at bedside: N/A       GI:     Current diet:  ADULT DIET; Regular; Low Fat/Low Chol/High Fiber/2 gm Na; 1800 ml  Passing flatus: YES  Tolerating current diet: YES       Pain Management:   Patient states pain is manageable on current regimen: N/A    Skin:     Interventions: turn team, float heels, increase time out of bed, foam dressing, PT/OT consult, and limit briefs    Patient Safety:  Fall Score:    Interventions: bed/chair alarm, gripper socks, and sitter at bedside       Length of Stay:  Expected LOS: 4  Actual LOS: 2      Digna Hair RN

## 2024-08-22 NOTE — PROGRESS NOTES
Attempted to schedule CARDIO hospital follow up appointment.  will like to make Cardio appointment . Pending patient discharge. Laura Chu, Care Management Assistant

## 2024-08-22 NOTE — CARE COORDINATION
Transition of Care Plan:    RUR: 35% High RUR  Prior Level of Functioning: NEEDS ASSISTANCE  Disposition: HOME WITH HOME HEALTH [facility] [phone]   If SNF or IPR: Date FOC offered: NA  Date FOC received:   Accepting facility:   Date authorization started with reference number:   Date authorization received and expires:   Follow up appointments: PCP/SPECIALIST  DME needed: NONE  Transportation at discharge: Stretcher need is anticipated  IM/IMM Medicare/ letter given: 2nd IM upon discharge  Is patient a [place] and connected with [facility]? na   If yes, was [place] transfer form completed and [facility] notified? na  Caregiver Contact: Wesley Oakes,(Spouse) [phone]    Discharge Caregiver contacted prior to discharge? contacted  Care Conference needed? no  Barriers to discharge: medical clearance    CM has secured home health with [facility] [phone]   Soc within 48 hours. The patient is anticipated to need stretcher       Sandra Bowers, RN  Case Management  [phone]

## 2024-08-22 NOTE — PLAN OF CARE
Problem: Safety - Adult  Goal: Free from fall injury  Outcome: Progressing  Flowsheets (Taken 8/21/2024 0740 by Dori Kirby, RN)  Free From Fall Injury: Instruct family/caregiver on patient safety     Problem: Discharge Planning  Goal: Discharge to home or other facility with appropriate resources  Outcome: Progressing  Flowsheets (Taken 8/21/2024 0451 by Ca Troy, RN)  Discharge to home or other facility with appropriate resources: Identify barriers to discharge with patient and caregiver     Problem: Pain  Goal: Verbalizes/displays adequate comfort level or baseline comfort level  Outcome: Progressing  Flowsheets (Taken 8/21/2024 0451 by Ca Troy, RN)  Verbalizes/displays adequate comfort level or baseline comfort level:   Encourage patient to monitor pain and request assistance   Assess pain using appropriate pain scale     Problem: Skin/Tissue Integrity  Goal: Absence of new skin breakdown  Description: 1.  Monitor for areas of redness and/or skin breakdown  2.  Assess vascular access sites hourly  3.  Every 4-6 hours minimum:  Change oxygen saturation probe site  4.  Every 4-6 hours:  If on nasal continuous positive airway pressure, respiratory therapy assess nares and determine need for appliance change or resting period.  Outcome: Progressing     Problem: Respiratory - Adult  Goal: Achieves optimal ventilation and oxygenation  Outcome: Progressing  Flowsheets (Taken 8/21/2024 0451 by Ca Troy, RN)  Achieves optimal ventilation and oxygenation:   Assess for changes in respiratory status   Assess for changes in mentation and behavior   Position to facilitate oxygenation and minimize respiratory effort   Oxygen supplementation based on oxygen saturation or arterial blood gases     Problem: Confusion  Goal: Confusion, delirium, dementia, or psychosis is improved or at baseline  Description: INTERVENTIONS:  1. Assess for possible contributors to thought disturbance, including medications,

## 2024-08-22 NOTE — PROGRESS NOTES
PT DAILY TREATMENT NOTE     Patient Name: Ramya Delgado  Date:2022  : 1959  [x]  Patient  Verified  Payor: [place] MEDICARE / Plan: VA MEDICARE PART A & B / Product Type: Medicare /    In time:8:20  Out time:9:05  Total Treatment Time (min): 39  Visit #: 1 of 10    Medicare/BCBS Only   Total Timed Codes (min):  23 1:1 Treatment Time:  45       Treatment Area: Pain in left shoulder [M25.512]  Bursitis of left shoulder [M75.52]  Left knee pain [M25.562]  Unilateral primary osteoarthritis, left knee [M17.12]    SUBJECTIVE  Pain Level (0-10 scale): 8-9/10  Any medication changes, allergies to medications, adverse drug reactions, diagnosis change, or new procedure performed?: [x] No    [] Yes (see summary sheet for update)  Subjective functional status/changes:   [] No changes reported    Chief Complaint: Left shoulder, knee  History/Mechanism of Injury: In October, was lifting sick sister that was about to faint. Felt pain onset a day or so later that has progressively worsened over time. In December, stepped into uneven soil in the grass, felt twinge in left knee that was intermittent in nature. During  holiday, was kneeling for prolonged period, cleaning kitchen cabinets and has had pain with fully flexing knee and shooting pains in subpatellar region. Current Symptoms/Deficits: Pt has increased pain with internal rotation of left arm and raising arm out to the side with elbow extended. Pain along knee cap, patellar fat pad, back of knee with bending knee, leaning or kneeling on left knee, prolonged standing/amb/sitting - stiffness and pain.   Pain-  Current: 8-9/10     Worst: 8-9/10   Best: 8-910  Previous Treatment/Compliance: heat pad  PMHx/Surgical Hx: MRI left shoulder - articular fraying posterior supraspinatus insertion, subdeltoid, subacromial bursitis  Work Hx: N/A  Living Situation: 1-story home with spouse with FROG; step to pattern with UE support with pain at times  Hobbies: Per morning report patient did not sleep overnight.  Patient fell asleep this afternoon and woke up yelling for her sister Elif.  Patient became very agitated.  Rapid was called due to patient becoming agitated and combative while trying to get out of bed and refused to be cleaned up.  Received orders for IM haldol and IV ativan.  Patient still agitated, yelling,  and trying to get out of bed, calling out \"please let me go\" \"Elif\".    swimming 2x/week - not doing currently  Pt Goals: \"Avoid surgery, reduction in pain. \"      OBJECTIVE    22 min [x]Eval                  []Re-Eval     23 min Therapeutic Activity:  []  See flow sheet : Patient education on therapy assessment, prognosis, expectations for therapy sessions, patient goals, and HEP. Rationale: to improve the patients ability to adhere to HEP and therapy sessions for increased compliance when working toward therapy goals.            With   [] TE   [x] TA   [] neuro   [] other: Patient Education: [x] Review HEP    [] Progressed/Changed HEP based on:   [] positioning   [] body mechanics   [] transfers   [] heat/ice application    [] other:      Other Objective/Functional Measures: FOTO 49 pts    Observation: WNL  Palpation: TTP at anterior left shoulder along proximal biceps; supraspinatus, infraspinatus mm; left quads, pes anserinus, ITB, distal HS mm    Shoulder AROM/PROM:                                           AROM (deg)              PROM (deg)   Right Left Right Left   Flexion 150 120  140   Extension 70 70     Scaption 150 125  135   ER at 45 Deg ABD 90 80     IR at 39 Deg ABD 70 70     Seated Flexion  --- --- --------------- ---------------       Strength:   Right (/5) Left (/5)   GHJ   Flexion 5 4-             Abduction 5 4-             Extension 5 5             ER 5 5             IR 5 5   Upper Trapezius  NT NT   Middle/Lower Trap NT NT   Elbow Flexion 5 5   Elbow Extension 5 5    Strength NT NT     Joint Feel: firm, empty end feel with pain  Scapulohumeral Rhythm: mild scapular hypomobility    Reflexes/Sensation: intact to light touch    Special Tests :      Right Left   Mg-German    +   Cross Arm     Speed's  +   Painful Arc  +   Sulcus     Apprehension     -   Right Left   Sulcus     Apprehension     Load + Shift     -   Right Left   ER Lag     Drop Arm     Empty Can     Belly Press     -   Right Left   Labral Shear       [place]'s     Biceps Load  -      - Other Tests/Measures:  Right Knee AROM 0 - 124 deg  Left Knee AROM -2 - 122 deg (pain at end range flexion)  Functional squat - increased pain left anterior knee  B Hip/Knee MMT: 5/5 grossly bilaterally      Pain Level (0-10 scale) post treatment: 8-9/10    ASSESSMENT/Changes in Function: See POC    Patient will continue to benefit from skilled PT services to modify and progress therapeutic interventions, address functional mobility deficits, address ROM deficits, address strength deficits, analyze and address soft tissue restrictions, analyze and cue movement patterns, analyze and modify body mechanics/ergonomics, assess and modify postural abnormalities, address imbalance/dizziness and instruct in home and community integration to attain remaining goals.      [x]  See Plan of Care  []  See progress note/recertification  []  See Discharge Summary         Progress towards goals / Updated goals:  See POC    PLAN  [x]  Upgrade activities as tolerated     []  Continue plan of care  [x]  Update interventions per flow sheet       []  Discharge due to:_  []  Other:_      Vanna Nick, PT 2/16/2022  8:19 AM    Future Appointments   Date Time Provider Roslyn Luna   3/7/2022 11:30 AM Matthew Sandy MD [address]   3/15/2022  1:30 PM [facility] INFUSION PHLEBOTOMIST [facility]   3/17/2022  1:30 PM [facility] FAST TRACK NURSE [facility]   6/30/2022 11:00 AM Jocelin Escobar DO [facility] AMB   7/26/2022  8:35 AM [facility] LAB VISIT [facility] AM[facility]   8/2/2022 10:20 AM Feliciano Castaneda MD [facility] AMB

## 2024-08-22 NOTE — SIGNIFICANT EVENT
Rapid Called at 1811    Responded to RRT at 1811 for Altered mental status    Provider at bedside: YES  Interventions ordered: haldol, ativan   Sepsis Suspected: No  Transfer to Higher Level of Care: na    Pt started to be ALOC and yelling \"I want to go home\". Primary RN, MD and writer at bedside assessed pt, several staff held pt arm to keep pt safe. IM Haldol was Rxed, and given by primary RN. Also pt was placed supine on bed to keep her safe.     Vitals:    08/22/24 1718   BP: (!) 142/75   Pulse:    Resp: 18   Temp: 98 °F (36.7 °C)   SpO2:         Rapid Ended at 1827  RRT RN assisted with transport to accepting unit No.    Alicja Rouse RN

## 2024-08-22 NOTE — PLAN OF CARE
with continued NWB of LLE. Improved bed mobility today able to come to sitting with heavy use of bed rails, verbal cueing and SBA with good tolerance for upright activity. Min Ax2 for lateral transfer (1 assist anteriorly, 1 assist to maintain LLE NWB) via sliding board to drop arm chair with patient offering great efforts with verbal cueing for hand and R foot placement to best facilitate transfer. O2 and HR stable on her baseline 2L during session.          PLAN :  Patient continues to benefit from skilled intervention to address the above impairments.  Continue treatment per established plan of care to address goals.    Recommend with staff: 2 assist sliding board transfer to R    Recommendation for discharge: (in order for the patient to meet his/her long term goals): Therapy 2x a week in the home, however, may require supervision, cognitive and/or physical assistance due to the following concerns listed below: or Therapy up to 5 days/week in Skilled nursing facility    Other factors to consider for discharge: high risk for falls, not safe to be alone, and concern for safely navigating or managing the home environment    IF patient discharges home will need the following DME: continuing to assess with progress       SUBJECTIVE:   Patient stated “It feels good to be up.”    OBJECTIVE DATA SUMMARY:   Cognitive/Behavioral Status:  Orientation  Orientation Level: Oriented to place;Oriented to person;Disoriented to situation  Cognition  Arousal/Alertness: Appropriate responses to stimuli  Following Commands: Follows one step commands consistently  Attention Span: Appears intact  Problem Solving: Assistance required to generate solutions;Assistance required to implement solutions  Initiation: Requires cues for some  Sequencing: Requires cues for some    Functional Mobility and Transfers for ADLs:  Bed Mobility:  Bed Mobility Training  Bed Mobility Training: Yes  Rolling: Stand-by assistance  Supine to Sit: Stand-by  assistance  Scooting: Stand-by assistance     Transfers:   Transfer Training  Transfer Training: Yes  Sliding Board: Minimum assistance;Assist X2;Additional time           Balance:     Balance  Sitting: Intact  Sitting - Static: Good (unsupported)  Sitting - Dynamic: Good (unsupported)      ADL Intervention:                                                    LE Dressing: Dependent/Total       Toileting: Dependent/Total                       Skin Care: Chlorhexidine wipes;Bath wipes;Foam skin cleanser                Pain Rating:  Pt reports pain in foot \"if I try to push on it in the bed\"    Pain Intervention(s):         Activity Tolerance:   Fair   Please refer to the flowsheet for vital signs taken during this treatment.    After treatment:   Patient left in no apparent distress sitting up in chair, Call bell within reach, Bed/ chair alarm activated, and Caregiver / family present    COMMUNICATION/EDUCATION:   The patient's plan of care was discussed with: physical therapist and registered nurse    Patient Education  Education Given To: Patient;Family  Education Provided: Role of Therapy;Plan of Care;Fall Prevention Strategies;Energy Conservation;Mobility Training;Transfer Training  Education Method: Demonstration;Verbal;Teach Back  Barriers to Learning: None  Education Outcome: Verbalized understanding;Continued education needed;Demonstrated understanding    Thank you for this referral.  Saima Lewis OT  Minutes: 26

## 2024-08-23 LAB
ANION GAP SERPL CALC-SCNC: 5 MMOL/L (ref 5–15)
BUN SERPL-MCNC: 17 MG/DL (ref 6–20)
BUN/CREAT SERPL: 10 (ref 12–20)
CALCIUM SERPL-MCNC: 9.4 MG/DL (ref 8.5–10.1)
CHLORIDE SERPL-SCNC: 107 MMOL/L (ref 97–108)
CO2 SERPL-SCNC: 30 MMOL/L (ref 21–32)
CREAT SERPL-MCNC: 1.65 MG/DL (ref 0.55–1.02)
ERYTHROCYTE [DISTWIDTH] IN BLOOD BY AUTOMATED COUNT: 19.4 % (ref 11.5–14.5)
GLUCOSE BLD STRIP.AUTO-MCNC: 136 MG/DL (ref 65–117)
GLUCOSE BLD STRIP.AUTO-MCNC: 173 MG/DL (ref 65–117)
GLUCOSE SERPL-MCNC: 156 MG/DL (ref 65–100)
HCT VFR BLD AUTO: 39.5 % (ref 35–47)
HGB BLD-MCNC: 11.2 G/DL (ref 11.5–16)
MAGNESIUM SERPL-MCNC: 1.9 MG/DL (ref 1.6–2.4)
MCH RBC QN AUTO: 25.4 PG (ref 26–34)
MCHC RBC AUTO-ENTMCNC: 28.4 G/DL (ref 30–36.5)
MCV RBC AUTO: 89.6 FL (ref 80–99)
NRBC # BLD: 0 K/UL (ref 0–0.01)
NRBC BLD-RTO: 0 PER 100 WBC
PLATELET # BLD AUTO: 212 K/UL (ref 150–400)
PMV BLD AUTO: 11.6 FL (ref 8.9–12.9)
POTASSIUM SERPL-SCNC: 3.4 MMOL/L (ref 3.5–5.1)
RBC # BLD AUTO: 4.41 M/UL (ref 3.8–5.2)
SERVICE CMNT-IMP: ABNORMAL
SERVICE CMNT-IMP: ABNORMAL
SODIUM SERPL-SCNC: 142 MMOL/L (ref 136–145)
WBC # BLD AUTO: 8.4 K/UL (ref 3.6–11)

## 2024-08-23 PROCEDURE — 82962 GLUCOSE BLOOD TEST: CPT

## 2024-08-23 PROCEDURE — 2580000003 HC RX 258: Performed by: INTERNAL MEDICINE

## 2024-08-23 PROCEDURE — 6360000002 HC RX W HCPCS: Performed by: INTERNAL MEDICINE

## 2024-08-23 PROCEDURE — 2700000000 HC OXYGEN THERAPY PER DAY

## 2024-08-23 PROCEDURE — 36415 COLL VENOUS BLD VENIPUNCTURE: CPT

## 2024-08-23 PROCEDURE — 6370000000 HC RX 637 (ALT 250 FOR IP): Performed by: INTERNAL MEDICINE

## 2024-08-23 PROCEDURE — 97530 THERAPEUTIC ACTIVITIES: CPT

## 2024-08-23 PROCEDURE — 94640 AIRWAY INHALATION TREATMENT: CPT

## 2024-08-23 PROCEDURE — 97535 SELF CARE MNGMENT TRAINING: CPT

## 2024-08-23 PROCEDURE — 80048 BASIC METABOLIC PNL TOTAL CA: CPT

## 2024-08-23 PROCEDURE — 1100000000 HC RM PRIVATE

## 2024-08-23 PROCEDURE — 94761 N-INVAS EAR/PLS OXIMETRY MLT: CPT

## 2024-08-23 PROCEDURE — 85027 COMPLETE CBC AUTOMATED: CPT

## 2024-08-23 PROCEDURE — 83735 ASSAY OF MAGNESIUM: CPT

## 2024-08-23 RX ORDER — ALBUTEROL SULFATE 0.83 MG/ML
2.5 SOLUTION RESPIRATORY (INHALATION) EVERY 4 HOURS PRN
Status: DISCONTINUED | OUTPATIENT
Start: 2024-08-23 | End: 2024-08-26 | Stop reason: HOSPADM

## 2024-08-23 RX ADMIN — Medication 400 MG: at 22:22

## 2024-08-23 RX ADMIN — Medication 0.09 G: at 13:50

## 2024-08-23 RX ADMIN — POLYETHYLENE GLYCOL 3350 17 G: 17 POWDER, FOR SOLUTION ORAL at 10:36

## 2024-08-23 RX ADMIN — Medication 0.09 G: at 22:57

## 2024-08-23 RX ADMIN — SODIUM CHLORIDE, PRESERVATIVE FREE 10 ML: 5 INJECTION INTRAVENOUS at 13:56

## 2024-08-23 RX ADMIN — PREGABALIN 150 MG: 75 CAPSULE ORAL at 10:36

## 2024-08-23 RX ADMIN — ENOXAPARIN SODIUM 30 MG: 100 INJECTION SUBCUTANEOUS at 22:22

## 2024-08-23 RX ADMIN — Medication 400 MG: at 10:40

## 2024-08-23 RX ADMIN — ENOXAPARIN SODIUM 30 MG: 100 INJECTION SUBCUTANEOUS at 10:36

## 2024-08-23 RX ADMIN — ISOSORBIDE MONONITRATE 60 MG: 30 TABLET, EXTENDED RELEASE ORAL at 10:38

## 2024-08-23 RX ADMIN — QUETIAPINE FUMARATE 25 MG: 25 TABLET ORAL at 18:05

## 2024-08-23 RX ADMIN — CARVEDILOL 25 MG: 12.5 TABLET, FILM COATED ORAL at 10:41

## 2024-08-23 RX ADMIN — FERROUS SULFATE TAB 325 MG (65 MG ELEMENTAL FE) 325 MG: 325 (65 FE) TAB at 10:42

## 2024-08-23 RX ADMIN — ASPIRIN 81 MG: 81 TABLET, CHEWABLE ORAL at 10:38

## 2024-08-23 RX ADMIN — BUMETANIDE 1 MG: 0.25 INJECTION INTRAMUSCULAR; INTRAVENOUS at 10:44

## 2024-08-23 RX ADMIN — AMLODIPINE BESYLATE 5 MG: 5 TABLET ORAL at 10:43

## 2024-08-23 RX ADMIN — SODIUM CHLORIDE, PRESERVATIVE FREE 10 ML: 5 INJECTION INTRAVENOUS at 22:58

## 2024-08-23 RX ADMIN — TRIAMCINOLONE ACETONIDE: 1 CREAM TOPICAL at 13:47

## 2024-08-23 RX ADMIN — MELATONIN 4.5 MG: at 22:22

## 2024-08-23 RX ADMIN — ARFORMOTEROL TARTRATE: 15 SOLUTION RESPIRATORY (INHALATION) at 22:06

## 2024-08-23 RX ADMIN — POTASSIUM BICARBONATE 20 MEQ: 782 TABLET, EFFERVESCENT ORAL at 10:43

## 2024-08-23 RX ADMIN — SODIUM CHLORIDE 1000 MG: 900 INJECTION INTRAVENOUS at 13:54

## 2024-08-23 RX ADMIN — ARFORMOTEROL TARTRATE: 15 SOLUTION RESPIRATORY (INHALATION) at 07:42

## 2024-08-23 RX ADMIN — Medication 1000 UNITS: at 10:43

## 2024-08-23 RX ADMIN — BUMETANIDE 1 MG: 0.25 INJECTION INTRAMUSCULAR; INTRAVENOUS at 18:03

## 2024-08-23 RX ADMIN — MICONAZOLE NITRATE: 20 CREAM TOPICAL at 13:49

## 2024-08-23 RX ADMIN — CARVEDILOL 25 MG: 12.5 TABLET, FILM COATED ORAL at 18:06

## 2024-08-23 NOTE — PROGRESS NOTES
Hospitalist Progress Note    NAME:   Marilee Oakes   : 1950   MRN: [id number]     Date/Time: 2024 10:44 AM  Patient PCP: Bel Pereira APRN - CNP    Estimated discharge date:   Barriers: Volume status, CHF      Assessment / Plan:    Acute on chronic diastolic heart failure  X-ray showed mild pulm edema, small to moderate bilateral pleural effusion  S/p bumex 1 mg iv in ED  Currently on 1 mg daily, changed to 1 mg IV twice daily  Still appear volume overloaded, continue IV diuretics.  Daily weight, weight has not been checked today       UTI:  Diagnosed on last admission, was discharged on levofloxacin  Continue ceftriaxone while he is here in the hospital, can be discharged on levofloxacin     Hx of delirium/ing  AVOID BENZOz, which will make delirium worse  Seroquel nightly  QTc reviewed, appropriate for paced rhythm  Can use Haldol judiciously     COPD  Chronic hypoxic respiratory failure on 2-3L nasal cannula  Sleep apnea  Not on exacerbation  Has CPAP at home, but not using, advised to bring CPAP in the hospital, if not then she can use CPAP from hospital          Acute hypokalemia:  Replaced,      Anxiety/agitation:  Hydroxyzine prn            Hypertension  CAD  Hyperlipidemia  EF 55-60%  Continue amlodipine, coreg, aspirin, imdur.     Intertrigo:  Continue miconazole     GERD:  Continue PPI        Impaired skin integrity      Recent left ankle surgery complicated by MRSA infection  - LLE boot and dressing  - patient previously seen at wound clinic  - follow by Dr. Chan wound care as outpatien  -Does not appear to be infected                   I personally reviewed imaging:CXR report  I personally reviewed EKG:  Toxic drug monitoring:   Discussed case with: RN, patient, IDR     Code Status: full  DVT Prophylaxis:lovenox   Baseline:        Subjective:     Chief Complaint / Reason for Physician Visit  Yesterday patient had rapid response as patient was agitated!!  Mental

## 2024-08-23 NOTE — PROGRESS NOTES
Physical Therapy    PT treatment deferred as pt with decreased MARLEN and confusion, unable to participate. Will con't to follow.    Flora Mathur, PT, MPT

## 2024-08-23 NOTE — PLAN OF CARE
Problem: Occupational Therapy - Adult  Goal: By Discharge: Performs self-care activities at highest level of function for planned discharge setting.  See evaluation for individualized goals.  Description: FUNCTIONAL STATUS PRIOR TO ADMISSION:  Patient was not ambulatory following L ankle fx and NWB status. Pt has been in and out of the hospital, recently dc home with  on 19th but admitted back at end of the day. Pt was getting help in all ADLs from her .     HOME SUPPORT: Patient lived with  for assistance.    Occupational Therapy Goals:  Initiated 8/21/2024  1.  Patient will perform grooming with Set-up seated EOB within 7 day(s).  2.  Patient will perform upper body dressing with Set-up seated EOB within 7 day(s).  3.  Patient will perform lower body dressing with Moderate Assist within 7 day(s).  4.  Patient will perform toilet transfers with Moderate Assist to [facility]  within 7 day(s).  5.  Patient will perform all aspects of toileting with Moderate Assist within 7 day(s).  6.  Patient will participate in upper extremity therapeutic exercise/activities with Minimal Assist for 15 minutes within 7 day(s).        Outcome: Not Progressing   OCCUPATIONAL THERAPY TREATMENT  Patient: Marilee Oakes (73 y.o. female)  Date: 8/23/2024  Primary Diagnosis: Acute on chronic diastolic heart failure (HCC) [I50.33]  Acute on chronic congestive heart failure, unspecified heart failure type (HCC) [I50.9]       Precautions: Weight Bearing, Contact Precautions, Up as Tolerated, Fall Risk (MRSA; NWB LLE, CAM boot when OOB)   Left Lower Extremity Weight Bearing: Non Weight Bearing (with CAM boot)            Chart, occupational therapy assessment, plan of care, and goals were reviewed.    ASSESSMENT  Patient continues to benefit from skilled OT services and is not progressing towards goals. Pt was supine in bed, and sleeping.  Per chart and nsg pt had been awake all night.  With sternal rub, she was able to wake  up, and but remained confused.  She was max assist to wash her face, and thought she was at home.  She continued to try to take off her gown, and stated that she was at home on her patio and needed to go to the hospital.  OT tried to get pt to sit on Eob and she refused and was so confused she did not know what OT was asking her to do.  Pt has sitter in room and was left with HOB elevated and continuing to to take her gown off .  Pt will need rehab at discharge.              PLAN :  Patient continues to benefit from skilled intervention to address the above impairments.  Continue treatment per established plan of care to address goals.    Recommend with staff: HOB elevated for meals and ADLs     Recommend next OT session: work on sitting on Eob in prep for ADLs     Recommendation for discharge: (in order for the patient to meet his/her long term goals): Therapy up to 5 days/week in Skilled nursing facility    Other factors to consider for discharge: poor safety awareness, impaired cognition, and high risk for falls    IF patient discharges home will need the following DME:  tbd       SUBJECTIVE:   Patient stated “I am at home.  I need to go to the hospital.  Why aren't you helping me get there?.”    OBJECTIVE DATA SUMMARY:   Cognitive/Behavioral Status:  Orientation  Orientation Level: Oriented to person;Disoriented to place;Disoriented to time;Disoriented to situation  Cognition  Overall Cognitive Status: Exceptions  Arousal/Alertness: Delayed responses to stimuli  Following Commands: Inconsistently follows commands  Attention Span: Impaired;Unable to maintain attention  Memory: Impaired  Safety Judgement: Impaired  Problem Solving: Impaired  Insights: Not aware of deficits  Initiation: Requires cues for all  Sequencing: Requires cues for all    Functional Mobility and Transfers for ADLs:           Balance:     Balance  Sitting: Intact (long sitting in bed)      ADL Intervention:         Feeding: Verbal

## 2024-08-24 LAB
ANION GAP SERPL CALC-SCNC: 8 MMOL/L (ref 5–15)
BUN SERPL-MCNC: 13 MG/DL (ref 6–20)
BUN/CREAT SERPL: 9 (ref 12–20)
CALCIUM SERPL-MCNC: 9 MG/DL (ref 8.5–10.1)
CHLORIDE SERPL-SCNC: 107 MMOL/L (ref 97–108)
CO2 SERPL-SCNC: 31 MMOL/L (ref 21–32)
CREAT SERPL-MCNC: 1.41 MG/DL (ref 0.55–1.02)
GLUCOSE BLD STRIP.AUTO-MCNC: 105 MG/DL (ref 65–117)
GLUCOSE BLD STRIP.AUTO-MCNC: 163 MG/DL (ref 65–117)
GLUCOSE BLD STRIP.AUTO-MCNC: 164 MG/DL (ref 65–117)
GLUCOSE SERPL-MCNC: 94 MG/DL (ref 65–100)
POTASSIUM SERPL-SCNC: 3.4 MMOL/L (ref 3.5–5.1)
SERVICE CMNT-IMP: ABNORMAL
SERVICE CMNT-IMP: ABNORMAL
SERVICE CMNT-IMP: NORMAL
SODIUM SERPL-SCNC: 146 MMOL/L (ref 136–145)

## 2024-08-24 PROCEDURE — 6360000002 HC RX W HCPCS: Performed by: INTERNAL MEDICINE

## 2024-08-24 PROCEDURE — 80048 BASIC METABOLIC PNL TOTAL CA: CPT

## 2024-08-24 PROCEDURE — 6370000000 HC RX 637 (ALT 250 FOR IP): Performed by: INTERNAL MEDICINE

## 2024-08-24 PROCEDURE — 2580000003 HC RX 258: Performed by: INTERNAL MEDICINE

## 2024-08-24 PROCEDURE — 94640 AIRWAY INHALATION TREATMENT: CPT

## 2024-08-24 PROCEDURE — 36415 COLL VENOUS BLD VENIPUNCTURE: CPT

## 2024-08-24 PROCEDURE — 94761 N-INVAS EAR/PLS OXIMETRY MLT: CPT

## 2024-08-24 PROCEDURE — 6370000000 HC RX 637 (ALT 250 FOR IP): Performed by: HOSPITALIST

## 2024-08-24 PROCEDURE — 1100000000 HC RM PRIVATE

## 2024-08-24 PROCEDURE — 2700000000 HC OXYGEN THERAPY PER DAY

## 2024-08-24 PROCEDURE — 82962 GLUCOSE BLOOD TEST: CPT

## 2024-08-24 RX ADMIN — ARFORMOTEROL TARTRATE: 15 SOLUTION RESPIRATORY (INHALATION) at 08:48

## 2024-08-24 RX ADMIN — ACETAMINOPHEN 650 MG: 325 TABLET ORAL at 05:29

## 2024-08-24 RX ADMIN — ASPIRIN 81 MG: 81 TABLET, CHEWABLE ORAL at 09:27

## 2024-08-24 RX ADMIN — MICONAZOLE NITRATE: 20 CREAM TOPICAL at 09:33

## 2024-08-24 RX ADMIN — Medication 400 MG: at 21:51

## 2024-08-24 RX ADMIN — MELATONIN 4.5 MG: at 21:51

## 2024-08-24 RX ADMIN — Medication 1000 UNITS: at 09:27

## 2024-08-24 RX ADMIN — SODIUM CHLORIDE, PRESERVATIVE FREE 10 ML: 5 INJECTION INTRAVENOUS at 09:00

## 2024-08-24 RX ADMIN — POLYETHYLENE GLYCOL 3350 17 G: 17 POWDER, FOR SOLUTION ORAL at 09:32

## 2024-08-24 RX ADMIN — POTASSIUM BICARBONATE 20 MEQ: 782 TABLET, EFFERVESCENT ORAL at 04:39

## 2024-08-24 RX ADMIN — SODIUM CHLORIDE, PRESERVATIVE FREE 10 ML: 5 INJECTION INTRAVENOUS at 22:08

## 2024-08-24 RX ADMIN — QUETIAPINE FUMARATE 25 MG: 25 TABLET ORAL at 17:33

## 2024-08-24 RX ADMIN — PREGABALIN 150 MG: 75 CAPSULE ORAL at 09:27

## 2024-08-24 RX ADMIN — HYDROXYZINE HYDROCHLORIDE 25 MG: 25 TABLET ORAL at 04:39

## 2024-08-24 RX ADMIN — ENOXAPARIN SODIUM 30 MG: 100 INJECTION SUBCUTANEOUS at 21:49

## 2024-08-24 RX ADMIN — ACETAMINOPHEN 650 MG: 325 TABLET ORAL at 21:50

## 2024-08-24 RX ADMIN — MICONAZOLE NITRATE: 20 CREAM TOPICAL at 22:01

## 2024-08-24 RX ADMIN — AMLODIPINE BESYLATE 5 MG: 5 TABLET ORAL at 09:27

## 2024-08-24 RX ADMIN — Medication 400 MG: at 09:27

## 2024-08-24 RX ADMIN — PANTOPRAZOLE SODIUM 40 MG: 40 TABLET, DELAYED RELEASE ORAL at 05:29

## 2024-08-24 RX ADMIN — TRIAMCINOLONE ACETONIDE: 1 CREAM TOPICAL at 22:01

## 2024-08-24 RX ADMIN — CARVEDILOL 25 MG: 12.5 TABLET, FILM COATED ORAL at 17:32

## 2024-08-24 RX ADMIN — HYDROXYZINE HYDROCHLORIDE 25 MG: 25 TABLET ORAL at 21:51

## 2024-08-24 RX ADMIN — ISOSORBIDE MONONITRATE 60 MG: 30 TABLET, EXTENDED RELEASE ORAL at 09:27

## 2024-08-24 RX ADMIN — POTASSIUM BICARBONATE 20 MEQ: 782 TABLET, EFFERVESCENT ORAL at 15:14

## 2024-08-24 RX ADMIN — HYDROXYZINE HYDROCHLORIDE 25 MG: 25 TABLET ORAL at 15:15

## 2024-08-24 RX ADMIN — BUMETANIDE 1 MG: 0.25 INJECTION INTRAMUSCULAR; INTRAVENOUS at 09:28

## 2024-08-24 RX ADMIN — Medication 0.09 G: at 22:00

## 2024-08-24 RX ADMIN — CARVEDILOL 25 MG: 12.5 TABLET, FILM COATED ORAL at 09:27

## 2024-08-24 RX ADMIN — ENOXAPARIN SODIUM 30 MG: 100 INJECTION SUBCUTANEOUS at 09:26

## 2024-08-24 RX ADMIN — TRIAMCINOLONE ACETONIDE: 1 CREAM TOPICAL at 09:32

## 2024-08-24 RX ADMIN — BUMETANIDE 1 MG: 0.25 INJECTION INTRAMUSCULAR; INTRAVENOUS at 17:32

## 2024-08-24 RX ADMIN — Medication 0.09 G: at 09:32

## 2024-08-24 RX ADMIN — RENO CAPS 1 MG: 100; 1.5; 1.7; 20; 10; 1; 150; 5; 6 CAPSULE ORAL at 09:26

## 2024-08-24 RX ADMIN — SODIUM CHLORIDE 1000 MG: 900 INJECTION INTRAVENOUS at 15:16

## 2024-08-24 RX ADMIN — ARFORMOTEROL TARTRATE: 15 SOLUTION RESPIRATORY (INHALATION) at 21:29

## 2024-08-24 RX ADMIN — FERROUS SULFATE TAB 325 MG (65 MG ELEMENTAL FE) 325 MG: 325 (65 FE) TAB at 09:27

## 2024-08-24 ASSESSMENT — PAIN SCALES - GENERAL
PAINLEVEL_OUTOF10: 10
PAINLEVEL_OUTOF10: 6

## 2024-08-24 ASSESSMENT — PAIN DESCRIPTION - LOCATION
LOCATION: NECK
LOCATION: GENERALIZED

## 2024-08-24 ASSESSMENT — PAIN DESCRIPTION - DESCRIPTORS: DESCRIPTORS: DISCOMFORT

## 2024-08-24 ASSESSMENT — PAIN SCALES - WONG BAKER: WONGBAKER_NUMERICALRESPONSE: NO HURT

## 2024-08-24 NOTE — PROGRESS NOTES
Report given in SBAR format to AGNES Castellano.  Patient given scheduled meds as ordered.  Was a bit anxious earlier in the shift and kept repeating \"They're coming to kill me, let them kill me first.\" Over and over so Atarax given PO.  Patient IV leaking so replaced with a 22g to the left FA.  Patient tolerated well.  Pt did refuse Dinner time glucose check.

## 2024-08-24 NOTE — PATIENT CHOICE
Patient is refusing glucose checks at this time. 1:1 sitter and I  attempted in asking to take it; refusing both times. RN aware.

## 2024-08-24 NOTE — PROGRESS NOTES
End of Shift Note    Bedside shift change report given to AGNES Allen (oncoming nurse) by Rosalind Ware LPN (offgoing nurse).  Report included the following information SBAR, Kardex, Procedure Summary, Intake/Output, MAR, Accordion, Recent Results, Med Rec Status, and Cardiac Rhythm V-paced    Shift worked:  [id number]     Shift summary and any significant changes:     Labs collected. PRN atarax given at 0439. PRN tylenol given at 0529. No complaints at the time of shift change. Plan of care ongoing.   Concerns for physician to address:  N/a     Zone phone for oncoming shift:   [phone]       Activity:     Number times ambulated in hallways past shift: 0  Number of times OOB to chair past shift: 0    Cardiac:   Cardiac Monitoring: Yes           Access:  Current line(s): PIV     Genitourinary:   Urinary status: incontinent and external catheter    Respiratory:      Chronic home O2 use?: YES  Incentive spirometer at bedside: NO       GI:     Current diet:  ADULT DIET; Regular; Low Fat/Low Chol/High Fiber/2 gm Na; 1800 ml  Passing flatus: YES  Tolerating current diet: YES       Pain Management:   Patient states pain is manageable on current regimen: YES    Skin:     Interventions: float heels, increase time out of bed, PT/OT consult, limit briefs, and internal/external urinary devices    Patient Safety:  Fall Score:    Interventions: bed/chair alarm, assistive device (walker, cane. etc), gripper socks, pt to call before getting OOB, stay with me (per policy), and sitter at bedside       Length of Stay:  Expected LOS: 6  Actual LOS: 3      Rosalind Ware LPN

## 2024-08-25 LAB
ANION GAP SERPL CALC-SCNC: 4 MMOL/L (ref 5–15)
BUN SERPL-MCNC: 16 MG/DL (ref 6–20)
BUN/CREAT SERPL: 9 (ref 12–20)
CALCIUM SERPL-MCNC: 8.3 MG/DL (ref 8.5–10.1)
CHLORIDE SERPL-SCNC: 106 MMOL/L (ref 97–108)
CO2 SERPL-SCNC: 36 MMOL/L (ref 21–32)
CREAT SERPL-MCNC: 1.69 MG/DL (ref 0.55–1.02)
GLUCOSE BLD STRIP.AUTO-MCNC: 121 MG/DL (ref 65–117)
GLUCOSE BLD STRIP.AUTO-MCNC: 150 MG/DL (ref 65–117)
GLUCOSE BLD STRIP.AUTO-MCNC: 162 MG/DL (ref 65–117)
GLUCOSE BLD STRIP.AUTO-MCNC: 187 MG/DL (ref 65–117)
GLUCOSE SERPL-MCNC: 165 MG/DL (ref 65–100)
MAGNESIUM SERPL-MCNC: 1.8 MG/DL (ref 1.6–2.4)
POTASSIUM SERPL-SCNC: 2.8 MMOL/L (ref 3.5–5.1)
SERVICE CMNT-IMP: ABNORMAL
SODIUM SERPL-SCNC: 146 MMOL/L (ref 136–145)

## 2024-08-25 PROCEDURE — 94640 AIRWAY INHALATION TREATMENT: CPT

## 2024-08-25 PROCEDURE — 6360000002 HC RX W HCPCS: Performed by: INTERNAL MEDICINE

## 2024-08-25 PROCEDURE — 6370000000 HC RX 637 (ALT 250 FOR IP): Performed by: HOSPITALIST

## 2024-08-25 PROCEDURE — 1100000000 HC RM PRIVATE

## 2024-08-25 PROCEDURE — 2700000000 HC OXYGEN THERAPY PER DAY

## 2024-08-25 PROCEDURE — 80048 BASIC METABOLIC PNL TOTAL CA: CPT

## 2024-08-25 PROCEDURE — 83735 ASSAY OF MAGNESIUM: CPT

## 2024-08-25 PROCEDURE — 82962 GLUCOSE BLOOD TEST: CPT

## 2024-08-25 PROCEDURE — 94761 N-INVAS EAR/PLS OXIMETRY MLT: CPT

## 2024-08-25 PROCEDURE — 36415 COLL VENOUS BLD VENIPUNCTURE: CPT

## 2024-08-25 PROCEDURE — 6360000002 HC RX W HCPCS: Performed by: HOSPITALIST

## 2024-08-25 PROCEDURE — 6370000000 HC RX 637 (ALT 250 FOR IP): Performed by: INTERNAL MEDICINE

## 2024-08-25 PROCEDURE — 2580000003 HC RX 258: Performed by: INTERNAL MEDICINE

## 2024-08-25 RX ORDER — POTASSIUM CHLORIDE 1500 MG/1
40 TABLET, EXTENDED RELEASE ORAL ONCE
Status: COMPLETED | OUTPATIENT
Start: 2024-08-25 | End: 2024-08-25

## 2024-08-25 RX ORDER — POTASSIUM CHLORIDE 7.45 MG/ML
10 INJECTION INTRAVENOUS ONCE
Status: COMPLETED | OUTPATIENT
Start: 2024-08-25 | End: 2024-08-25

## 2024-08-25 RX ADMIN — HYDROXYZINE HYDROCHLORIDE 25 MG: 25 TABLET ORAL at 21:04

## 2024-08-25 RX ADMIN — ARFORMOTEROL TARTRATE: 15 SOLUTION RESPIRATORY (INHALATION) at 21:54

## 2024-08-25 RX ADMIN — FERROUS SULFATE TAB 325 MG (65 MG ELEMENTAL FE) 325 MG: 325 (65 FE) TAB at 09:52

## 2024-08-25 RX ADMIN — CARVEDILOL 25 MG: 12.5 TABLET, FILM COATED ORAL at 18:43

## 2024-08-25 RX ADMIN — Medication 400 MG: at 21:03

## 2024-08-25 RX ADMIN — QUETIAPINE FUMARATE 25 MG: 25 TABLET ORAL at 18:44

## 2024-08-25 RX ADMIN — Medication 400 MG: at 09:51

## 2024-08-25 RX ADMIN — POTASSIUM CHLORIDE 10 MEQ: 7.46 INJECTION, SOLUTION INTRAVENOUS at 05:46

## 2024-08-25 RX ADMIN — Medication 0.09 G: at 10:05

## 2024-08-25 RX ADMIN — TRIAMCINOLONE ACETONIDE: 1 CREAM TOPICAL at 10:33

## 2024-08-25 RX ADMIN — SODIUM CHLORIDE, PRESERVATIVE FREE 10 ML: 5 INJECTION INTRAVENOUS at 21:12

## 2024-08-25 RX ADMIN — RENO CAPS 1 MG: 100; 1.5; 1.7; 20; 10; 1; 150; 5; 6 CAPSULE ORAL at 09:49

## 2024-08-25 RX ADMIN — CARVEDILOL 25 MG: 12.5 TABLET, FILM COATED ORAL at 09:48

## 2024-08-25 RX ADMIN — MICONAZOLE NITRATE: 20 CREAM TOPICAL at 21:12

## 2024-08-25 RX ADMIN — PANTOPRAZOLE SODIUM 40 MG: 40 TABLET, DELAYED RELEASE ORAL at 05:46

## 2024-08-25 RX ADMIN — ASPIRIN 81 MG: 81 TABLET, CHEWABLE ORAL at 09:52

## 2024-08-25 RX ADMIN — ISOSORBIDE MONONITRATE 60 MG: 30 TABLET, EXTENDED RELEASE ORAL at 09:51

## 2024-08-25 RX ADMIN — POTASSIUM BICARBONATE 20 MEQ: 782 TABLET, EFFERVESCENT ORAL at 09:51

## 2024-08-25 RX ADMIN — POTASSIUM CHLORIDE 40 MEQ: 1500 TABLET, EXTENDED RELEASE ORAL at 05:46

## 2024-08-25 RX ADMIN — ACETAMINOPHEN 650 MG: 325 TABLET ORAL at 15:16

## 2024-08-25 RX ADMIN — TRIAMCINOLONE ACETONIDE: 1 CREAM TOPICAL at 21:13

## 2024-08-25 RX ADMIN — ENOXAPARIN SODIUM 30 MG: 100 INJECTION SUBCUTANEOUS at 21:03

## 2024-08-25 RX ADMIN — BUMETANIDE 1 MG: 0.25 INJECTION INTRAMUSCULAR; INTRAVENOUS at 09:49

## 2024-08-25 RX ADMIN — PREGABALIN 150 MG: 75 CAPSULE ORAL at 09:50

## 2024-08-25 RX ADMIN — SODIUM CHLORIDE, PRESERVATIVE FREE 10 ML: 5 INJECTION INTRAVENOUS at 09:57

## 2024-08-25 RX ADMIN — ENOXAPARIN SODIUM 30 MG: 100 INJECTION SUBCUTANEOUS at 09:52

## 2024-08-25 RX ADMIN — AMLODIPINE BESYLATE 5 MG: 5 TABLET ORAL at 09:52

## 2024-08-25 RX ADMIN — Medication 0.09 G: at 21:14

## 2024-08-25 RX ADMIN — MELATONIN 4.5 MG: at 21:03

## 2024-08-25 RX ADMIN — ONDANSETRON 4 MG: 4 TABLET, ORALLY DISINTEGRATING ORAL at 09:51

## 2024-08-25 RX ADMIN — BUMETANIDE 1 MG: 0.25 INJECTION INTRAMUSCULAR; INTRAVENOUS at 18:44

## 2024-08-25 RX ADMIN — MICONAZOLE NITRATE: 20 CREAM TOPICAL at 10:05

## 2024-08-25 RX ADMIN — Medication 1000 UNITS: at 09:49

## 2024-08-25 ASSESSMENT — PAIN DESCRIPTION - LOCATION: LOCATION: BACK

## 2024-08-25 ASSESSMENT — PAIN SCALES - GENERAL
PAINLEVEL_OUTOF10: 4
PAINLEVEL_OUTOF10: 1
PAINLEVEL_OUTOF10: 6

## 2024-08-25 ASSESSMENT — PAIN DESCRIPTION - DESCRIPTORS: DESCRIPTORS: ACHING;DISCOMFORT

## 2024-08-25 NOTE — PROGRESS NOTES
Spiritual Health Assessment/Progress Note  [facility]    Initial Encounter,  ,  ,      Name: Marilee Oakes MRN: [id number]    Age: 73 y.o.     Sex: female   Language: English   [employer]ism: [employer]an   Acute on chronic diastolic heart failure (HCC)     Date: 8/25/2024            Total Time Calculated: 22 min              Spiritual Assessment began in [facility] 3 MED TELE        Referral/Consult From: Rounding   Encounter Overview/Reason: Initial Encounter  Service Provided For: Patient and family together    Hemalatha, Belief, Meaning:   Patient identifies as spiritual, is connected with a hemalatha tradition or spiritual practice, and has beliefs or practices that help with coping during difficult times  Family/Friends identify as spiritual      Importance and Influence:  Patient Other: unknown  Family/Friends Other: unknown    Community:  Patient is connected with a spiritual community and feels well-supported. Support system includes: Spouse/Partner, Children, and Hemalatha Community  Family/Friends feel well-supported. Support system includes: Spouse/Partner and Children    Assessment and Plan of Care:     Patient Interventions include: Facilitated expression of thoughts and feelings, Explored spiritual coping/struggle/distress and theological reflection, and Affirmed coping skills/support systems  Family/Friends Interventions include: Other:      Patient Plan of Care: No spiritual needs identified for follow-up and Spiritual Care available upon further referral  Family/Friends Plan of Care: No spiritual needs identified for follow-up and Spiritual Care available upon further referral    Mrs Oakes spoke about her recent health challenges and her hopes of going home soon.  She and her  have two daughters and three grandsons.  Mrs Oakes shared her daughters offer support as they are able.  She attends [employer] with her sister.  Shared prayer with her and her .     CHARLEEN Khan,  Psychiatric, Staff [facility]     Paging Service [phone] ([phone])      Electronically signed by Chaplain LANETTE, [facility]B on 8/25/2024 at 10:51 AM

## 2024-08-25 NOTE — PROGRESS NOTES
Hospitalist Progress Note    NAME:   Marilee Oakes   : 1950   MRN: [id number]     Date/Time: 2024 12:38 PM  Patient PCP: Bel Pereira APRN - CNP    Estimated discharge date:   Barriers: Volume status, CHF      Assessment / Plan:    Acute on chronic diastolic heart failure  X-ray showed mild pulm edema, small to moderate bilateral pleural effusion  S/p bumex 1 mg iv in ED  Currently on 1 mg daily, changed to 1 mg IV twice daily  Still appear volume overloaded, continue IV diuretics.  Weight improving, continue same medications       UTI:  Diagnosed on last admission, was discharged on levofloxacin  Continue ceftriaxone while he is here in the hospital, can be discharged on levofloxacin     Hx of delirium/sundowning  AVOID BENZOz, which will make delirium worse  Seroquel nightly  QTc reviewed, appropriate for paced rhythm  Can use Haldol judiciously     COPD  Chronic hypoxic respiratory failure on 2-3L nasal cannula  Sleep apnea  Not on exacerbation  Has CPAP at home, but not using, advised to bring CPAP in the hospital, if not then she can use CPAP from hospital          Acute hypokalemia:  Replaced,      Anxiety/agitation:  Hydroxyzine prn            Hypertension  CAD  Hyperlipidemia  EF 55-60%  Continue amlodipine, coreg, aspirin, imdur.     Intertrigo:  Continue miconazole     GERD:  Continue PPI        Impaired skin integrity      Recent left ankle surgery complicated by MRSA infection  - LLE boot and dressing  - patient previously seen at wound clinic  - follow by Dr. Chan wound care as outpatien  -Does not appear to be infected                   I personally reviewed imaging: Ultrasound  I personally reviewed EKG:  Toxic drug monitoring:   Discussed case with: RN, patient, IDR     Code Status: full  DVT Prophylaxis:lovenox   Baseline:        Subjective:     Chief Complaint / Reason for Physician Visit  Continues to have episode of sundowning in the evening.  No fever overnight.   Discussed with  at the bedside  Objective:     VITALS:   Last 24hrs VS reviewed since prior progress note. Most recent are:  Patient Vitals for the past 24 hrs:   BP Temp Temp src Pulse Resp SpO2 Weight   08/25/24 0818 (!) 169/90 99 °F (37.2 °C) Oral 80 18 95 % --   08/25/24 0313 121/63 98.1 °F (36.7 °C) Oral 80 18 95 % --   08/25/24 0300 -- -- -- -- -- -- 108.9 kg (240 lb 1.3 oz)   08/24/24 1958 (!) 149/57 99 °F (37.2 °C) Oral 93 18 100 % --   08/24/24 1601 136/80 -- Oral 80 24 (!) 87 % --   08/24/24 1309 (!) 108/53 97.6 °F (36.4 °C) Axillary 86 19 92 % --         Intake/Output Summary (Last 24 hours) at 8/25/2024 1238  Last data filed at 8/25/2024 0613  Gross per 24 hour   Intake 125 ml   Output 800 ml   Net -675 ml          I had a face to face encounter and independently examined this patient on 8/25/2024, as outlined below:  PHYSICAL EXAM:  General: Alert, cooperative  EENT:  EOMI. Anicteric sclerae.  Resp:  Diminished air entry bilateral lower lobes, crackles at bases  CV:  Regular  rhythm, 1-2+ pitting edema bilateral legs  GI:  Soft, Non distended, Non tender.  +Bowel sounds  Neurologic:  Alert and oriented X 2  Psych:   Fair insight  Skin:  No rashes.  No jaundice    Reviewed most current lab test results and cultures  YES  Reviewed most current radiology test results   YES  Review and summation of old records today    NO  Reviewed patient's current orders and MAR    YES  PMH/SH reviewed - no change compared to H&P  ________________________________________________________________________  Care Plan discussed with:    Comments   Patient x    Family      RN x    Care Manager     Consultant                        Multidiciplinary team rounds were held today with , nursing, pharmacist and clinical coordinator.  Patient's plan of care was discussed; medications were reviewed and discharge planning was addressed.

## 2024-08-26 VITALS
OXYGEN SATURATION: 97 % | WEIGHT: 240.08 LBS | HEART RATE: 80 BPM | DIASTOLIC BLOOD PRESSURE: 85 MMHG | RESPIRATION RATE: 20 BRPM | SYSTOLIC BLOOD PRESSURE: 143 MMHG | HEIGHT: 69 IN | BODY MASS INDEX: 35.56 KG/M2 | TEMPERATURE: 97.5 F

## 2024-08-26 LAB
GLUCOSE BLD STRIP.AUTO-MCNC: 122 MG/DL (ref 65–117)
GLUCOSE BLD STRIP.AUTO-MCNC: 259 MG/DL (ref 65–117)
SERVICE CMNT-IMP: ABNORMAL
SERVICE CMNT-IMP: ABNORMAL

## 2024-08-26 PROCEDURE — 94761 N-INVAS EAR/PLS OXIMETRY MLT: CPT

## 2024-08-26 PROCEDURE — 6360000002 HC RX W HCPCS: Performed by: INTERNAL MEDICINE

## 2024-08-26 PROCEDURE — 94640 AIRWAY INHALATION TREATMENT: CPT

## 2024-08-26 PROCEDURE — 2580000003 HC RX 258: Performed by: INTERNAL MEDICINE

## 2024-08-26 PROCEDURE — 6370000000 HC RX 637 (ALT 250 FOR IP): Performed by: INTERNAL MEDICINE

## 2024-08-26 PROCEDURE — 82962 GLUCOSE BLOOD TEST: CPT

## 2024-08-26 PROCEDURE — 2700000000 HC OXYGEN THERAPY PER DAY

## 2024-08-26 PROCEDURE — 6370000000 HC RX 637 (ALT 250 FOR IP): Performed by: STUDENT IN AN ORGANIZED HEALTH CARE EDUCATION/TRAINING PROGRAM

## 2024-08-26 RX ORDER — LEVOFLOXACIN 750 MG/1
750 TABLET, FILM COATED ORAL
Qty: 3 TABLET | Refills: 0 | Status: ON HOLD | OUTPATIENT
Start: 2024-08-26 | End: 2024-08-31

## 2024-08-26 RX ORDER — PREGABALIN 75 MG/1
75 CAPSULE ORAL DAILY
Qty: 30 CAPSULE | Refills: 0 | Status: ON HOLD | OUTPATIENT
Start: 2024-08-26 | End: 2024-09-25

## 2024-08-26 RX ORDER — QUETIAPINE FUMARATE 25 MG/1
25 TABLET, FILM COATED ORAL EVERY EVENING
Qty: 60 TABLET | Refills: 3 | Status: ON HOLD | OUTPATIENT
Start: 2024-08-26

## 2024-08-26 RX ORDER — BUMETANIDE 1 MG/1
1.5 TABLET ORAL DAILY
Qty: 45 TABLET | Refills: 0 | Status: ON HOLD | OUTPATIENT
Start: 2024-08-26 | End: 2024-09-25

## 2024-08-26 RX ORDER — POTASSIUM CHLORIDE 1500 MG/1
20 TABLET, EXTENDED RELEASE ORAL 2 TIMES DAILY
Qty: 60 TABLET | Refills: 0 | Status: ON HOLD | OUTPATIENT
Start: 2024-08-26 | End: 2024-09-25

## 2024-08-26 RX ADMIN — ISOSORBIDE MONONITRATE 60 MG: 30 TABLET, EXTENDED RELEASE ORAL at 08:54

## 2024-08-26 RX ADMIN — INSULIN LISPRO 4 UNITS: 100 INJECTION, SOLUTION INTRAVENOUS; SUBCUTANEOUS at 13:23

## 2024-08-26 RX ADMIN — Medication 0.09 G: at 08:56

## 2024-08-26 RX ADMIN — TRIAMCINOLONE ACETONIDE: 1 CREAM TOPICAL at 08:58

## 2024-08-26 RX ADMIN — AMLODIPINE BESYLATE 5 MG: 5 TABLET ORAL at 08:54

## 2024-08-26 RX ADMIN — ARFORMOTEROL TARTRATE: 15 SOLUTION RESPIRATORY (INHALATION) at 08:31

## 2024-08-26 RX ADMIN — CARVEDILOL 25 MG: 12.5 TABLET, FILM COATED ORAL at 08:54

## 2024-08-26 RX ADMIN — Medication 400 MG: at 08:54

## 2024-08-26 RX ADMIN — POLYETHYLENE GLYCOL 3350 17 G: 17 POWDER, FOR SOLUTION ORAL at 08:57

## 2024-08-26 RX ADMIN — ALPRAZOLAM 0.5 MG: 0.5 TABLET ORAL at 14:25

## 2024-08-26 RX ADMIN — BUMETANIDE 1 MG: 0.25 INJECTION INTRAMUSCULAR; INTRAVENOUS at 08:54

## 2024-08-26 RX ADMIN — SODIUM CHLORIDE, PRESERVATIVE FREE 5 ML: 5 INJECTION INTRAVENOUS at 08:57

## 2024-08-26 RX ADMIN — Medication 1000 UNITS: at 08:53

## 2024-08-26 RX ADMIN — FERROUS SULFATE TAB 325 MG (65 MG ELEMENTAL FE) 325 MG: 325 (65 FE) TAB at 08:54

## 2024-08-26 RX ADMIN — ALPRAZOLAM 0.5 MG: 0.5 TABLET ORAL at 01:35

## 2024-08-26 RX ADMIN — ASPIRIN 81 MG: 81 TABLET, CHEWABLE ORAL at 08:54

## 2024-08-26 RX ADMIN — RENO CAPS 1 MG: 100; 1.5; 1.7; 20; 10; 1; 150; 5; 6 CAPSULE ORAL at 08:54

## 2024-08-26 RX ADMIN — ENOXAPARIN SODIUM 30 MG: 100 INJECTION SUBCUTANEOUS at 08:55

## 2024-08-26 RX ADMIN — MICONAZOLE NITRATE: 20 CREAM TOPICAL at 08:57

## 2024-08-26 RX ADMIN — PANTOPRAZOLE SODIUM 40 MG: 40 TABLET, DELAYED RELEASE ORAL at 08:53

## 2024-08-26 ASSESSMENT — PAIN SCALES - GENERAL: PAINLEVEL_OUTOF10: 0

## 2024-08-26 NOTE — DISCHARGE INSTRUCTIONS
HOSPITALIST DISCHARGE INSTRUCTIONS    NAME: Marilee Oakes   :  1950   MRN:  [id number]     Date/Time:  2024 12:58 PM    ADMIT DATE: 2024   DISCHARGE DATE: 2024     Acute on chronic diastolic heart failure  UTI:  Hx of delirium/sundowning  COPD  Chronic hypoxic respiratory failure on 2-3L nasal cannula  Sleep apnea  Anxiety/agitation:    It is important that you take the medication exactly as they are prescribed.   Keep your medication in the bottles provided by the pharmacist and keep a list of the medication names, dosages, and times to be taken in your wallet.   Do not take other medications without consulting your doctor.       What to do at Home    Recommended diet:  cardiac diet    Recommended activity: activity as tolerated      If you have questions regarding the hospital related prescriptions or hospital related issues please call [facility]' office at . You can always direct your questions to your primary care doctor if you are unable to reach your hospital physician; your PCP works as an extension of your hospital doctor just like your hospital doctor is an extension of your PCP for your time at the hospital ([facility])    If you experience any of the following symptoms then please call your primary care physician or return to the emergency room if you cannot get hold of your doctor:    Fever, chills, nausea, vomiting, or persistent diarrhea  Worsening weakness or new problems with your speech or balance  Dark stools or visible blood in your stools  New Leg swelling or shortness of breath as these could be signs of a clot    Additional Instructions:      Bring these papers with you to your follow up appointments. The papers will help your doctors be sure to continue the care plan from the hospital.              Information obtained by :  I understand that if any problems occur once I am at home I am to contact my physician.    I understand and acknowledge  receipt of the instructions indicated above.                                                                                                                                           Physician's or R.N.'s Signature                                                                  Date/Time                                                                                                                                              Patient or Representative Signature

## 2024-08-26 NOTE — PROGRESS NOTES
Patient's  would like to schedule the cardiologist appt per [facility] note on 8/22/24 8455. Pending patient discharge.

## 2024-08-26 NOTE — CARE COORDINATION
Cleared for D/C from CM standpoint    Transition of Care Plan:    RUR: 35%  Prior Level of Functioning: needs assistance  Disposition: home with spouse, [facility]  If SNF or IPR: Date FOC offered: spouse declined placement  Follow up appointments: PCP, specialists as indicated   DME needed: owns DME required for discharge   Transportation at discharge: [facility], ETA 3:30PM  IM/IMM Medicare/ letter given: given  Is patient a  and connected with VA? no   If yes, was [place] transfer form completed and VA notified?   Caregiver Contact: spouse   Discharge Caregiver contacted prior to discharge? yes  Care Conference needed? no  Barriers to discharge:  none    Chart reviewed. CM aware of discharge order. Met with pt and spouse at bedside this AM to finalize and review d/c plan. 2nd IM given. Pt returning home with spouse and resumption of [facility] care services. CM secured [facility] transport for d/c, ETA 3:30PM No further CM needs identified at this time.        08/26/24 1413   Services At/After Discharge   Transition of Care Consult (CM Consult) Discharge Planning   Services At/After Discharge Home Health;Transport    Resource Information Provided? No   Mode of Transport at Discharge [facility]  ([facility])   [facility] Transport Time of Discharge 1530   Confirm Follow Up Transport Family   Condition of Participation: Discharge Planning   The Plan for Transition of Care is related to the following treatment goals: home with HHC, OP follow up appointments   The Patient and/or Patient Representative was provided with a Choice of Provider? Patient;Patient Representative   Name of the Patient Representative who was provided with the Choice of Provider and agrees with the Discharge Plan?  spouse   The Patient and/Or Patient Representative agree with the Discharge Plan? Yes   Freedom of Choice list was provided with basic dialogue that supports the patient's individualized plan of care/goals, treatment

## 2024-08-26 NOTE — PLAN OF CARE
Problem: Safety - Adult  Goal: Free from fall injury  Outcome: Adequate for Discharge  Flowsheets (Taken 8/26/2024 0835)  Free From Fall Injury: Instruct family/caregiver on patient safety     Problem: Discharge Planning  Goal: Discharge to home or other facility with appropriate resources  Outcome: Adequate for Discharge     Problem: Pain  Goal: Verbalizes/displays adequate comfort level or baseline comfort level  Outcome: Adequate for Discharge     Problem: Skin/Tissue Integrity  Goal: Absence of new skin breakdown  Description: 1.  Monitor for areas of redness and/or skin breakdown  2.  Assess vascular access sites hourly  3.  Every 4-6 hours minimum:  Change oxygen saturation probe site  4.  Every 4-6 hours:  If on nasal continuous positive airway pressure, respiratory therapy assess nares and determine need for appliance change or resting period.  Outcome: Adequate for Discharge     Problem: Respiratory - Adult  Goal: Achieves optimal ventilation and oxygenation  8/26/2024 1619 by Esther Sierra RN  Outcome: Adequate for Discharge  Flowsheets (Taken 8/26/2024 0835)  Achieves optimal ventilation and oxygenation: Assess for changes in respiratory status  8/26/2024 0550 by Akash Mendoza RCP  Outcome: Progressing     Problem: Confusion  Goal: Confusion, delirium, dementia, or psychosis is improved or at baseline  Description: INTERVENTIONS:  1. Assess for possible contributors to thought disturbance, including medications, impaired vision or hearing, underlying metabolic abnormalities, dehydration, psychiatric diagnoses, and notify attending LIP  2. [place] high risk fall precautions, as indicated  3. Provide frequent short contacts to provide reality reorientation, refocusing and direction  4. Decrease environmental stimuli, including noise as appropriate  5. Monitor and intervene to maintain adequate nutrition, hydration, elimination, sleep and activity  6. If unable to ensure safety without constant  attention obtain sitter and review sitter guidelines with assigned personnel  7. Initiate Psychosocial CNS and Spiritual Care consult, as indicated  Outcome: Adequate for Discharge     Problem: Chronic Conditions and Co-morbidities  Goal: Patient's chronic conditions and co-morbidity symptoms are monitored and maintained or improved  Outcome: Adequate for Discharge

## 2024-08-26 NOTE — DISCHARGE SUMMARY
Discharge Summary    Name: Marilee Oakes  [id number]  YOB: 1950 (Age: 73 y.o.)   Date of Admission: 8/19/2024  Date of Discharge: 8/26/2024  Attending Physician: Ricky Troy MD    Discharge Diagnosis:   Acute on chronic diastolic heart failure  UTI:  Hx of delirium/sundowning  COPD  Chronic hypoxic respiratory failure on 2-3L nasal cannula  Sleep apnea  Anxiety/agitation:               Baseline:   Consultations:  IP CONSULT TO PSYCHIATRY  IP WOUND CARE NURSE CONSULT TO EVAL  IP CONSULT TO CASE MANAGEMENT      Brief Admission History/Reason for Admission Per Kathy Siddiqui MD:   Marilee Oakes is a 73 y.o.  female with PMHx significant for COPD on home oxygen 2-3L, CAD, hypertension, hyperlipidemia, diastolic heart failure presented brought to hospital by  with c/o chest pain.   Patient was discharged from hospital yesterday.   stated that patient was discharged when he felt that patient was not ready for discharge, now she is back as she was c/o chest pain.  Patient was alert and oriented X 4 to me. However, was agitated and wanting to leave.  Initially when I went to see patient, patient's  was by the bedside, she was calm- when asked why she was here, she pointed towards her  and asked me to talk to him.   Later, after her  left, patient was agitated, trying to get out of bed, removing oxygen off. I went to evaluate her again. She was able to tell me that she was in the hospital, date, president names. Denied chest pain to me. Stated that she was here because her  didn't want to take care of her. She stated that she wants to go home. I explained to her that its not safe to go home as her  doesn't want her home and want her to get treatment here. Patient states that she has her sister and friends to take care of her.  I called  again and told him that she doesn't want to stay in the hospital.

## 2024-08-28 ENCOUNTER — APPOINTMENT (OUTPATIENT)
Facility: HOSPITAL | Age: 74
DRG: 291 | End: 2024-08-28
Payer: MEDICARE

## 2024-08-28 ENCOUNTER — HOSPITAL ENCOUNTER (INPATIENT)
Facility: HOSPITAL | Age: 74
LOS: 6 days | Discharge: HOME HEALTH | DRG: 291 | End: 2024-09-04
Attending: EMERGENCY MEDICINE | Admitting: STUDENT IN AN ORGANIZED HEALTH CARE EDUCATION/TRAINING PROGRAM
Payer: MEDICARE

## 2024-08-28 DIAGNOSIS — J81.0 ACUTE PULMONARY EDEMA (HCC): Primary | ICD-10-CM

## 2024-08-28 DIAGNOSIS — L03.116 CELLULITIS OF LEFT LOWER EXTREMITY: ICD-10-CM

## 2024-08-28 DIAGNOSIS — R06.03 ACUTE RESPIRATORY DISTRESS: ICD-10-CM

## 2024-08-28 LAB
ALBUMIN SERPL-MCNC: 2.5 G/DL (ref 3.5–5)
ALBUMIN/GLOB SERPL: 0.8 (ref 1.1–2.2)
ALP SERPL-CCNC: 121 U/L (ref 45–117)
ALT SERPL-CCNC: 11 U/L (ref 12–78)
ANION GAP SERPL CALC-SCNC: ABNORMAL MMOL/L (ref 5–15)
AST SERPL-CCNC: 10 U/L (ref 15–37)
BASOPHILS # BLD: 0 K/UL (ref 0–0.1)
BASOPHILS NFR BLD: 0 % (ref 0–1)
BILIRUB SERPL-MCNC: 0.4 MG/DL (ref 0.2–1)
BUN SERPL-MCNC: 22 MG/DL (ref 6–20)
BUN/CREAT SERPL: 12 (ref 12–20)
CALCIUM SERPL-MCNC: 8.5 MG/DL (ref 8.5–10.1)
CHLORIDE SERPL-SCNC: 108 MMOL/L (ref 97–108)
CO2 SERPL-SCNC: 39 MMOL/L (ref 21–32)
COMMENT:: NORMAL
CREAT SERPL-MCNC: 1.81 MG/DL (ref 0.55–1.02)
DIFFERENTIAL METHOD BLD: ABNORMAL
EOSINOPHIL # BLD: 0.4 K/UL (ref 0–0.4)
EOSINOPHIL NFR BLD: 8 % (ref 0–7)
ERYTHROCYTE [DISTWIDTH] IN BLOOD BY AUTOMATED COUNT: 18.5 % (ref 11.5–14.5)
GLOBULIN SER CALC-MCNC: 3.2 G/DL (ref 2–4)
GLUCOSE SERPL-MCNC: 179 MG/DL (ref 65–100)
HCT VFR BLD AUTO: 32 % (ref 35–47)
HGB BLD-MCNC: 9 G/DL (ref 11.5–16)
IMM GRANULOCYTES # BLD AUTO: 0 K/UL (ref 0–0.04)
IMM GRANULOCYTES NFR BLD AUTO: 0 % (ref 0–0.5)
LACTATE BLD-SCNC: 1.34 MMOL/L (ref 0.4–2)
LYMPHOCYTES # BLD: 0.6 K/UL (ref 0.8–3.5)
LYMPHOCYTES NFR BLD: 12 % (ref 12–49)
MAGNESIUM SERPL-MCNC: 2.1 MG/DL (ref 1.6–2.4)
MCH RBC QN AUTO: 25.8 PG (ref 26–34)
MCHC RBC AUTO-ENTMCNC: 28.1 G/DL (ref 30–36.5)
MCV RBC AUTO: 91.7 FL (ref 80–99)
MONOCYTES # BLD: 0.6 K/UL (ref 0–1)
MONOCYTES NFR BLD: 11 % (ref 5–13)
NEUTS SEG # BLD: 3.5 K/UL (ref 1.8–8)
NEUTS SEG NFR BLD: 69 % (ref 32–75)
NRBC # BLD: 0 K/UL (ref 0–0.01)
NRBC BLD-RTO: 0 PER 100 WBC
NT PRO BNP: 2493 PG/ML
PLATELET # BLD AUTO: 155 K/UL (ref 150–400)
PMV BLD AUTO: 11.7 FL (ref 8.9–12.9)
POTASSIUM SERPL-SCNC: 3.9 MMOL/L (ref 3.5–5.1)
PROT SERPL-MCNC: 5.7 G/DL (ref 6.4–8.2)
RBC # BLD AUTO: 3.49 M/UL (ref 3.8–5.2)
RBC MORPH BLD: ABNORMAL
SARS-COV-2 RNA RESP QL NAA+PROBE: NOT DETECTED
SODIUM SERPL-SCNC: 146 MMOL/L (ref 136–145)
SOURCE: NORMAL
SPECIMEN HOLD: NORMAL
TROPONIN I SERPL HS-MCNC: 23 NG/L (ref 0–51)
WBC # BLD AUTO: 5.1 K/UL (ref 3.6–11)

## 2024-08-28 PROCEDURE — 6360000002 HC RX W HCPCS: Performed by: EMERGENCY MEDICINE

## 2024-08-28 PROCEDURE — 84484 ASSAY OF TROPONIN QUANT: CPT

## 2024-08-28 PROCEDURE — 71045 X-RAY EXAM CHEST 1 VIEW: CPT

## 2024-08-28 PROCEDURE — 96375 TX/PRO/DX INJ NEW DRUG ADDON: CPT

## 2024-08-28 PROCEDURE — 99285 EMERGENCY DEPT VISIT HI MDM: CPT

## 2024-08-28 PROCEDURE — 83735 ASSAY OF MAGNESIUM: CPT

## 2024-08-28 PROCEDURE — 87635 SARS-COV-2 COVID-19 AMP PRB: CPT

## 2024-08-28 PROCEDURE — 87040 BLOOD CULTURE FOR BACTERIA: CPT

## 2024-08-28 PROCEDURE — 85652 RBC SED RATE AUTOMATED: CPT

## 2024-08-28 PROCEDURE — 93005 ELECTROCARDIOGRAM TRACING: CPT | Performed by: EMERGENCY MEDICINE

## 2024-08-28 PROCEDURE — 85025 COMPLETE CBC W/AUTO DIFF WBC: CPT

## 2024-08-28 PROCEDURE — 83880 ASSAY OF NATRIURETIC PEPTIDE: CPT

## 2024-08-28 PROCEDURE — 36415 COLL VENOUS BLD VENIPUNCTURE: CPT

## 2024-08-28 PROCEDURE — 80053 COMPREHEN METABOLIC PANEL: CPT

## 2024-08-28 PROCEDURE — 83605 ASSAY OF LACTIC ACID: CPT

## 2024-08-28 PROCEDURE — 86140 C-REACTIVE PROTEIN: CPT

## 2024-08-28 RX ORDER — FUROSEMIDE 10 MG/ML
60 INJECTION INTRAMUSCULAR; INTRAVENOUS
Status: COMPLETED | OUTPATIENT
Start: 2024-08-28 | End: 2024-08-28

## 2024-08-28 RX ADMIN — FUROSEMIDE 60 MG: 10 INJECTION, SOLUTION INTRAMUSCULAR; INTRAVENOUS at 22:32

## 2024-08-28 ASSESSMENT — PAIN SCALES - GENERAL
PAINLEVEL_OUTOF10: 0
PAINLEVEL_OUTOF10: 0

## 2024-08-29 ENCOUNTER — APPOINTMENT (OUTPATIENT)
Facility: HOSPITAL | Age: 74
DRG: 291 | End: 2024-08-29
Payer: MEDICARE

## 2024-08-29 PROBLEM — I50.9 ACUTE HEART FAILURE, UNSPECIFIED HEART FAILURE TYPE (HCC): Status: ACTIVE | Noted: 2024-08-29

## 2024-08-29 LAB
APPEARANCE UR: CLEAR
BACTERIA URNS QL MICRO: NEGATIVE /HPF
BILIRUB UR QL: NEGATIVE
COLOR UR: ABNORMAL
CRP SERPL-MCNC: 4.36 MG/DL (ref 0–0.3)
EKG ATRIAL RATE: 64 BPM
EKG DIAGNOSIS: NORMAL
EKG Q-T INTERVAL: 436 MS
EKG QRS DURATION: 138 MS
EKG QTC CALCULATION (BAZETT): 502 MS
EKG R AXIS: 249 DEGREES
EKG T AXIS: 56 DEGREES
EKG VENTRICULAR RATE: 80 BPM
EPITH CASTS URNS QL MICRO: ABNORMAL /LPF
ERYTHROCYTE [SEDIMENTATION RATE] IN BLOOD: 41 MM/HR (ref 0–30)
GLUCOSE UR STRIP.AUTO-MCNC: NEGATIVE MG/DL
HGB UR QL STRIP: ABNORMAL
HYALINE CASTS URNS QL MICRO: ABNORMAL /LPF (ref 0–2)
KETONES UR QL STRIP.AUTO: NEGATIVE MG/DL
LEUKOCYTE ESTERASE UR QL STRIP.AUTO: ABNORMAL
NITRITE UR QL STRIP.AUTO: NEGATIVE
PH UR STRIP: 6.5 (ref 5–8)
PROT UR STRIP-MCNC: NEGATIVE MG/DL
RBC #/AREA URNS HPF: ABNORMAL /HPF (ref 0–5)
SP GR UR REFRACTOMETRY: 1.01
URINE CULTURE IF INDICATED: ABNORMAL
UROBILINOGEN UR QL STRIP.AUTO: 0.2 EU/DL (ref 0.2–1)
WBC URNS QL MICRO: ABNORMAL /HPF (ref 0–4)

## 2024-08-29 PROCEDURE — 6370000000 HC RX 637 (ALT 250 FOR IP): Performed by: STUDENT IN AN ORGANIZED HEALTH CARE EDUCATION/TRAINING PROGRAM

## 2024-08-29 PROCEDURE — 96375 TX/PRO/DX INJ NEW DRUG ADDON: CPT

## 2024-08-29 PROCEDURE — 94640 AIRWAY INHALATION TREATMENT: CPT

## 2024-08-29 PROCEDURE — 6360000002 HC RX W HCPCS: Performed by: STUDENT IN AN ORGANIZED HEALTH CARE EDUCATION/TRAINING PROGRAM

## 2024-08-29 PROCEDURE — 2580000003 HC RX 258: Performed by: EMERGENCY MEDICINE

## 2024-08-29 PROCEDURE — 1100000003 HC PRIVATE W/ TELEMETRY

## 2024-08-29 PROCEDURE — 81001 URINALYSIS AUTO W/SCOPE: CPT

## 2024-08-29 PROCEDURE — 96365 THER/PROPH/DIAG IV INF INIT: CPT

## 2024-08-29 PROCEDURE — 2580000003 HC RX 258: Performed by: STUDENT IN AN ORGANIZED HEALTH CARE EDUCATION/TRAINING PROGRAM

## 2024-08-29 PROCEDURE — 2700000000 HC OXYGEN THERAPY PER DAY

## 2024-08-29 PROCEDURE — 73610 X-RAY EXAM OF ANKLE: CPT

## 2024-08-29 PROCEDURE — 94761 N-INVAS EAR/PLS OXIMETRY MLT: CPT

## 2024-08-29 PROCEDURE — 94760 N-INVAS EAR/PLS OXIMETRY 1: CPT

## 2024-08-29 PROCEDURE — 6360000002 HC RX W HCPCS: Performed by: EMERGENCY MEDICINE

## 2024-08-29 RX ORDER — QUETIAPINE FUMARATE 25 MG/1
25 TABLET, FILM COATED ORAL EVERY EVENING
Status: DISCONTINUED | OUTPATIENT
Start: 2024-08-29 | End: 2024-09-04 | Stop reason: HOSPADM

## 2024-08-29 RX ORDER — ACETAMINOPHEN 650 MG/1
650 SUPPOSITORY RECTAL EVERY 6 HOURS PRN
Status: DISCONTINUED | OUTPATIENT
Start: 2024-08-29 | End: 2024-09-04 | Stop reason: HOSPADM

## 2024-08-29 RX ORDER — SODIUM CHLORIDE 0.9 % (FLUSH) 0.9 %
5-40 SYRINGE (ML) INJECTION PRN
Status: DISCONTINUED | OUTPATIENT
Start: 2024-08-29 | End: 2024-09-04 | Stop reason: HOSPADM

## 2024-08-29 RX ORDER — ASPIRIN 81 MG/1
81 TABLET, CHEWABLE ORAL DAILY
Status: DISCONTINUED | OUTPATIENT
Start: 2024-08-29 | End: 2024-09-04 | Stop reason: HOSPADM

## 2024-08-29 RX ORDER — DOXYCYCLINE HYCLATE 100 MG
100 TABLET ORAL
Status: DISCONTINUED | OUTPATIENT
Start: 2024-08-29 | End: 2024-08-29

## 2024-08-29 RX ORDER — ONDANSETRON 4 MG/1
4 TABLET, ORALLY DISINTEGRATING ORAL EVERY 8 HOURS PRN
Status: DISCONTINUED | OUTPATIENT
Start: 2024-08-29 | End: 2024-09-04 | Stop reason: HOSPADM

## 2024-08-29 RX ORDER — PANTOPRAZOLE SODIUM 40 MG/1
40 TABLET, DELAYED RELEASE ORAL
Status: DISCONTINUED | OUTPATIENT
Start: 2024-08-29 | End: 2024-09-04 | Stop reason: HOSPADM

## 2024-08-29 RX ORDER — BUMETANIDE 0.25 MG/ML
2 INJECTION INTRAMUSCULAR; INTRAVENOUS 2 TIMES DAILY
Status: DISCONTINUED | OUTPATIENT
Start: 2024-08-29 | End: 2024-09-03

## 2024-08-29 RX ORDER — CARVEDILOL 12.5 MG/1
25 TABLET ORAL 2 TIMES DAILY WITH MEALS
Status: DISCONTINUED | OUTPATIENT
Start: 2024-08-29 | End: 2024-09-04 | Stop reason: HOSPADM

## 2024-08-29 RX ORDER — PREGABALIN 75 MG/1
75 CAPSULE ORAL DAILY
Status: DISCONTINUED | OUTPATIENT
Start: 2024-08-29 | End: 2024-09-04 | Stop reason: HOSPADM

## 2024-08-29 RX ORDER — MICONAZOLE NITRATE 20 MG/G
CREAM TOPICAL 2 TIMES DAILY
Status: DISCONTINUED | OUTPATIENT
Start: 2024-08-29 | End: 2024-09-04 | Stop reason: HOSPADM

## 2024-08-29 RX ORDER — ONDANSETRON 2 MG/ML
4 INJECTION INTRAMUSCULAR; INTRAVENOUS EVERY 6 HOURS PRN
Status: DISCONTINUED | OUTPATIENT
Start: 2024-08-29 | End: 2024-09-04 | Stop reason: HOSPADM

## 2024-08-29 RX ORDER — AMLODIPINE BESYLATE 5 MG/1
5 TABLET ORAL DAILY
Status: DISCONTINUED | OUTPATIENT
Start: 2024-08-29 | End: 2024-09-04 | Stop reason: HOSPADM

## 2024-08-29 RX ORDER — FERROUS SULFATE 325(65) MG
325 TABLET ORAL
Status: DISCONTINUED | OUTPATIENT
Start: 2024-08-29 | End: 2024-09-04 | Stop reason: HOSPADM

## 2024-08-29 RX ORDER — BUMETANIDE 0.25 MG/ML
1 INJECTION INTRAMUSCULAR; INTRAVENOUS ONCE
Status: COMPLETED | OUTPATIENT
Start: 2024-08-29 | End: 2024-08-29

## 2024-08-29 RX ORDER — LANOLIN ALCOHOL/MO/W.PET/CERES
6 CREAM (GRAM) TOPICAL NIGHTLY
Status: DISCONTINUED | OUTPATIENT
Start: 2024-08-29 | End: 2024-09-04 | Stop reason: HOSPADM

## 2024-08-29 RX ORDER — ECHINACEA PURPUREA EXTRACT 125 MG
2 TABLET ORAL EVERY 8 HOURS PRN
Status: DISCONTINUED | OUTPATIENT
Start: 2024-08-29 | End: 2024-09-04 | Stop reason: HOSPADM

## 2024-08-29 RX ORDER — ACETAMINOPHEN 325 MG/1
650 TABLET ORAL EVERY 6 HOURS PRN
Status: DISCONTINUED | OUTPATIENT
Start: 2024-08-29 | End: 2024-09-04 | Stop reason: HOSPADM

## 2024-08-29 RX ORDER — SODIUM CHLORIDE 9 MG/ML
INJECTION, SOLUTION INTRAVENOUS PRN
Status: DISCONTINUED | OUTPATIENT
Start: 2024-08-29 | End: 2024-09-04 | Stop reason: HOSPADM

## 2024-08-29 RX ORDER — IPRATROPIUM BROMIDE AND ALBUTEROL SULFATE 2.5; .5 MG/3ML; MG/3ML
1 SOLUTION RESPIRATORY (INHALATION) EVERY 4 HOURS PRN
Status: DISCONTINUED | OUTPATIENT
Start: 2024-08-29 | End: 2024-09-04 | Stop reason: HOSPADM

## 2024-08-29 RX ORDER — POLYETHYLENE GLYCOL 3350 17 G/17G
17 POWDER, FOR SOLUTION ORAL DAILY PRN
Status: DISCONTINUED | OUTPATIENT
Start: 2024-08-29 | End: 2024-09-04 | Stop reason: HOSPADM

## 2024-08-29 RX ORDER — ISOSORBIDE MONONITRATE 30 MG/1
60 TABLET, EXTENDED RELEASE ORAL DAILY
Status: DISCONTINUED | OUTPATIENT
Start: 2024-08-29 | End: 2024-09-04 | Stop reason: HOSPADM

## 2024-08-29 RX ORDER — SODIUM CHLORIDE 0.9 % (FLUSH) 0.9 %
5-40 SYRINGE (ML) INJECTION EVERY 12 HOURS SCHEDULED
Status: DISCONTINUED | OUTPATIENT
Start: 2024-08-29 | End: 2024-09-04 | Stop reason: HOSPADM

## 2024-08-29 RX ORDER — ACETAMINOPHEN 325 MG/1
650 TABLET ORAL EVERY 4 HOURS PRN
Status: DISCONTINUED | OUTPATIENT
Start: 2024-08-29 | End: 2024-08-29

## 2024-08-29 RX ORDER — ENOXAPARIN SODIUM 100 MG/ML
40 INJECTION SUBCUTANEOUS DAILY
Status: DISCONTINUED | OUTPATIENT
Start: 2024-08-29 | End: 2024-08-30

## 2024-08-29 RX ADMIN — PREGABALIN 75 MG: 75 CAPSULE ORAL at 11:10

## 2024-08-29 RX ADMIN — SODIUM CHLORIDE, PRESERVATIVE FREE 10 ML: 5 INJECTION INTRAVENOUS at 11:11

## 2024-08-29 RX ADMIN — ACETAMINOPHEN 650 MG: 325 TABLET ORAL at 21:32

## 2024-08-29 RX ADMIN — BUMETANIDE 2 MG: 0.25 INJECTION INTRAMUSCULAR; INTRAVENOUS at 18:55

## 2024-08-29 RX ADMIN — BUMETANIDE 2 MG: 0.25 INJECTION INTRAMUSCULAR; INTRAVENOUS at 11:10

## 2024-08-29 RX ADMIN — SODIUM CHLORIDE, PRESERVATIVE FREE 10 ML: 5 INJECTION INTRAVENOUS at 21:33

## 2024-08-29 RX ADMIN — CARVEDILOL 25 MG: 12.5 TABLET, FILM COATED ORAL at 11:10

## 2024-08-29 RX ADMIN — BUMETANIDE 1 MG: 0.25 INJECTION INTRAMUSCULAR; INTRAVENOUS at 00:46

## 2024-08-29 RX ADMIN — MELATONIN 6 MG: at 02:44

## 2024-08-29 RX ADMIN — MICONAZOLE NITRATE: 20 CREAM TOPICAL at 12:45

## 2024-08-29 RX ADMIN — FERROUS SULFATE TAB 325 MG (65 MG ELEMENTAL FE) 325 MG: 325 (65 FE) TAB at 11:10

## 2024-08-29 RX ADMIN — CARVEDILOL 25 MG: 12.5 TABLET, FILM COATED ORAL at 18:55

## 2024-08-29 RX ADMIN — ARFORMOTEROL TARTRATE: 15 SOLUTION RESPIRATORY (INHALATION) at 21:35

## 2024-08-29 RX ADMIN — MELATONIN 6 MG: at 21:32

## 2024-08-29 RX ADMIN — AMLODIPINE BESYLATE 5 MG: 5 TABLET ORAL at 11:10

## 2024-08-29 RX ADMIN — QUETIAPINE FUMARATE 25 MG: 25 TABLET ORAL at 18:55

## 2024-08-29 RX ADMIN — ASPIRIN 81 MG: 81 TABLET, CHEWABLE ORAL at 11:10

## 2024-08-29 RX ADMIN — ARFORMOTEROL TARTRATE: 15 SOLUTION RESPIRATORY (INHALATION) at 08:07

## 2024-08-29 RX ADMIN — ISOSORBIDE MONONITRATE 60 MG: 30 TABLET, EXTENDED RELEASE ORAL at 11:10

## 2024-08-29 RX ADMIN — Medication 2500 MG: at 02:15

## 2024-08-29 RX ADMIN — MICONAZOLE NITRATE: 20 CREAM TOPICAL at 21:34

## 2024-08-29 RX ADMIN — CEFEPIME 2000 MG: 2 INJECTION, POWDER, FOR SOLUTION INTRAVENOUS at 01:17

## 2024-08-29 RX ADMIN — ENOXAPARIN SODIUM 40 MG: 100 INJECTION SUBCUTANEOUS at 11:11

## 2024-08-29 ASSESSMENT — PAIN DESCRIPTION - DESCRIPTORS: DESCRIPTORS: ACHING

## 2024-08-29 ASSESSMENT — PAIN - FUNCTIONAL ASSESSMENT: PAIN_FUNCTIONAL_ASSESSMENT: PREVENTS OR INTERFERES WITH MANY ACTIVE NOT PASSIVE ACTIVITIES

## 2024-08-29 ASSESSMENT — PAIN SCALES - GENERAL
PAINLEVEL_OUTOF10: 5
PAINLEVEL_OUTOF10: 0

## 2024-08-29 ASSESSMENT — PAIN DESCRIPTION - LOCATION: LOCATION: FOOT

## 2024-08-29 ASSESSMENT — PAIN DESCRIPTION - ORIENTATION: ORIENTATION: LEFT

## 2024-08-29 NOTE — ED NOTES
TRANSFER - OUT REPORT:    Verbal report given to Suzie ALARCON on Marilee Oakes  being transferred to [facility] for routine progression of patient care       Report consisted of patient's Situation, Background, Assessment and   Recommendations(SBAR).     Information from the following report(s) Nurse Handoff Report was reviewed with the receiving nurse.    [place] Fall Assessment:    Presents to emergency department  because of falls (Syncope, seizure, or loss of consciousness): No  Age > 70: Yes  Altered Mental Status, Intoxication with alcohol or substance confusion (Disorientation, impaired judgment, poor safety awaremess, or inability to follow instructions): No  Impaired Mobility: Ambulates or transfers with assistive devices or assistance; Unable to ambulate or transer.: Yes  Nursing Judgement: Yes          Lines:   Peripheral IV 08/28/24 Right;Anterior Cephalic (Active)        Opportunity for questions and clarification was provided.      Patient transported with:  Monitor, O2 @ 2lpm, and Tech

## 2024-08-29 NOTE — CARE COORDINATION
Care Management Initial Assessment       RUR: 39%  Readmission? Yes - discharged on /26 1st IM letter given? Yes   1st  letter given: No     Chart reviewed. Pt recently discharged from [facility] on Monday, 8/26. Spoke with spouse by phone today to complete assessment. Verified contact information and demographics. Pt lives with spouse in a one level home with ramped entrance to back door. Spouse is primary caregiver. Pt has been mostly non ambulatory for the past 10 weeks due to ankle infection. Pt was due to see ortho last week but was in the hospital. Spouse has rescheduled this appointment to 9/10 @ 8AM with Dr. Chan. Spouse confirms [facility] made visit prior to return to hospital. Pt has home O2 provided by [employer]. Preferred pharmacy is [facility] on [address]. Hx of SNF stay at [facility]. Pt has two supportive daughters who live nearby. ACP on file. Inquired if pt has applied for Medicaid in the past- spouse reports she applied and was denied due to being over income.     Anticipated d/c plan is to return home with spouse and [facility]. Pt will need medical transport. Will continue to follow.         08/29/24 1602   Service Assessment   Patient Orientation Alert and Oriented   Cognition Alert   History Provided By Spouse;Medical Record   Primary Caregiver Spouse   Support Systems Spouse/Significant Other;Children   Patient's Healthcare Decision Maker is: Named in Scanned ACP Document   PCP Verified by CM Yes   Last Visit to PCP Within last 3 months   Prior Functional Level Assistance with the following:;Bathing;Dressing;Toileting;Cooking;Housework;Shopping;Mobility   Current Functional Level Assistance with the following:;Bathing;Dressing;Toileting;Cooking;Housework;Shopping;Mobility   Can patient return to prior living arrangement Yes   Ability to make needs known: Good   Family able to assist with home care needs: Yes   Would you like for me to discuss the discharge plan with

## 2024-08-29 NOTE — WOUND CARE
gel with gauze. Cover with ABD and wrap with ismael. Date each dressing.   Wound care to follow up with patient if she is here on Tuesday.   Keep the heels floated and turn the patient for pressure injury prevention.  Verónica Collins RN, BSN, CWON

## 2024-08-29 NOTE — ED NOTES
This nurse tried x2 to call report to Med Tele unit but no one would take report in order to transport pt.

## 2024-08-29 NOTE — ED PROVIDER NOTES
elevated at 2493.  IV Lasix ordered.      Patient observed for couple hours following IV Lasix without any significant urinary output.  Will order 1 of Bumex.  Will consult hospitalist for admission.    Given the erythema of the lower leg with an elevated CRP we will start IV antibiotics to cover for cellulitis.  Do not feel that this is a surgical issue at this time.    FINAL IMPRESSION     1. Acute pulmonary edema (HCC)    2. Acute respiratory distress    3. Cellulitis of left lower extremity          DISPOSITION/PLAN   Marilee Oakes's  results have been reviewed with her.  She has been counseled regarding her diagnosis, treatment, and plan.  She verbally conveys understanding and agreement of the signs, symptoms, diagnosis, treatment and prognosis and additionally agrees to follow up as discussed.  She also agrees with the care-plan and conveys that all of her questions have been answered.     CLINICAL IMPRESSION    Admit Note: Pt is being admitted by hospitalist. The results of their tests and reason(s) for their admission have been discussed with pt and/or available family. They convey agreement and understanding for the need to be admitted and for the admission diagnosis.           I am the Primary Clinician of Record.   Rico Manuel DO (electronically signed)    (Please note that parts of this dictation were completed with voice recognition software. Quite often unanticipated grammatical, syntax, homophones, and other interpretive errors are inadvertently transcribed by the computer software. Please disregards these errors. Please excuse any errors that have escaped final proofreading.)          Rico Manuel DO  08/29/24 0146

## 2024-08-29 NOTE — H&P
Hospitalist Admission Note    NAME:  Marilee Oakes   :  1950   MRN:  [id number]     Date/Time:  2024 2:22 AM    Patient PCP: Bel Pereira APRN - CNP    ______________________________________________________________________  Given the patient's current clinical presentation, I have a high level of concern for decompensation if discharged from the emergency department.  Complex decision making was performed, which includes reviewing the patient's available past medical records, laboratory results, and x-ray films.       My assessment of this patient's clinical condition and my plan of care is as follows.    Assessment / Plan:    Active Problems:  Acute diastolic heart failure exacerbation  CAD  Essential hypertension and hyperlipidemia  COPD with chronic hypoxemic respiratory failure 2-3 L requirement  Possible recurrent ankle infection  Elevated CRP  History left ankle ORIF complicated by MRSA  Intertrigo  Chronic hypernatremia  CKD 3, baseline  Normocytic anemia, at baseline  GERD  History sundowning  Dementia    Plan:  Acute diastolic heart failure exacerbation  CAD  Essential hypertension and hyperlipidemia  Admit to telemetry monitoring  Twice daily Bumex  -Strict intake and output with daily weights  -Monitor electrolytes and replace as indicated  Elevate BLE  Continue PTA amlodipine, carvedilol, Imdur    COPD with chronic hypoxemic respiratory failure 2-3 L requirement  Formulary substitution arformoterol budesonide nebulizer  DuoNebs every 4 hours as needed    Possible recurrent ankle infection  Elevated CRP  History left ankle ORIF complicated by MRSA  Continue empiric vancomycin and cefepime  Wound care consulted    Intertrigo  Formulary substitution miconazole     Chronic hypernatremia  Trend sodium    CKD 3, baseline  Trend renal function  Renally dose medications and avoid nephrotoxic agents    Normocytic anemia, at baseline  Trend hemoglobin    GERD  Continue PTA

## 2024-08-30 LAB
ANION GAP SERPL CALC-SCNC: 1 MMOL/L (ref 5–15)
BASOPHILS # BLD: 0.1 K/UL (ref 0–0.1)
BASOPHILS NFR BLD: 1 % (ref 0–1)
BUN SERPL-MCNC: 20 MG/DL (ref 6–20)
BUN/CREAT SERPL: 12 (ref 12–20)
CALCIUM SERPL-MCNC: 9 MG/DL (ref 8.5–10.1)
CHLORIDE SERPL-SCNC: 106 MMOL/L (ref 97–108)
CO2 SERPL-SCNC: 37 MMOL/L (ref 21–32)
CREAT SERPL-MCNC: 1.69 MG/DL (ref 0.55–1.02)
DIFFERENTIAL METHOD BLD: ABNORMAL
EOSINOPHIL # BLD: 0.5 K/UL (ref 0–0.4)
EOSINOPHIL NFR BLD: 9 % (ref 0–7)
ERYTHROCYTE [DISTWIDTH] IN BLOOD BY AUTOMATED COUNT: 18.4 % (ref 11.5–14.5)
GLUCOSE SERPL-MCNC: 109 MG/DL (ref 65–100)
HCT VFR BLD AUTO: 33.1 % (ref 35–47)
HGB BLD-MCNC: 9.4 G/DL (ref 11.5–16)
IMM GRANULOCYTES # BLD AUTO: 0 K/UL (ref 0–0.04)
IMM GRANULOCYTES NFR BLD AUTO: 0 % (ref 0–0.5)
LYMPHOCYTES # BLD: 0.9 K/UL (ref 0.8–3.5)
LYMPHOCYTES NFR BLD: 17 % (ref 12–49)
MCH RBC QN AUTO: 25.7 PG (ref 26–34)
MCHC RBC AUTO-ENTMCNC: 28.4 G/DL (ref 30–36.5)
MCV RBC AUTO: 90.4 FL (ref 80–99)
MONOCYTES # BLD: 0.5 K/UL (ref 0–1)
MONOCYTES NFR BLD: 9 % (ref 5–13)
NEUTS SEG # BLD: 3.5 K/UL (ref 1.8–8)
NEUTS SEG NFR BLD: 64 % (ref 32–75)
NRBC # BLD: 0 K/UL (ref 0–0.01)
NRBC BLD-RTO: 0 PER 100 WBC
PLATELET # BLD AUTO: 151 K/UL (ref 150–400)
PMV BLD AUTO: 11.7 FL (ref 8.9–12.9)
POTASSIUM SERPL-SCNC: 3.7 MMOL/L (ref 3.5–5.1)
RBC # BLD AUTO: 3.66 M/UL (ref 3.8–5.2)
RBC MORPH BLD: ABNORMAL
RBC MORPH BLD: ABNORMAL
SODIUM SERPL-SCNC: 144 MMOL/L (ref 136–145)
WBC # BLD AUTO: 5.5 K/UL (ref 3.6–11)

## 2024-08-30 PROCEDURE — 80048 BASIC METABOLIC PNL TOTAL CA: CPT

## 2024-08-30 PROCEDURE — 6360000002 HC RX W HCPCS: Performed by: STUDENT IN AN ORGANIZED HEALTH CARE EDUCATION/TRAINING PROGRAM

## 2024-08-30 PROCEDURE — 2580000003 HC RX 258: Performed by: STUDENT IN AN ORGANIZED HEALTH CARE EDUCATION/TRAINING PROGRAM

## 2024-08-30 PROCEDURE — 99221 1ST HOSP IP/OBS SF/LOW 40: CPT | Performed by: ORTHOPAEDIC SURGERY

## 2024-08-30 PROCEDURE — 36415 COLL VENOUS BLD VENIPUNCTURE: CPT

## 2024-08-30 PROCEDURE — 1100000003 HC PRIVATE W/ TELEMETRY

## 2024-08-30 PROCEDURE — 94640 AIRWAY INHALATION TREATMENT: CPT

## 2024-08-30 PROCEDURE — 2700000000 HC OXYGEN THERAPY PER DAY

## 2024-08-30 PROCEDURE — 85025 COMPLETE CBC W/AUTO DIFF WBC: CPT

## 2024-08-30 PROCEDURE — 6370000000 HC RX 637 (ALT 250 FOR IP): Performed by: STUDENT IN AN ORGANIZED HEALTH CARE EDUCATION/TRAINING PROGRAM

## 2024-08-30 PROCEDURE — 94761 N-INVAS EAR/PLS OXIMETRY MLT: CPT

## 2024-08-30 RX ORDER — ENOXAPARIN SODIUM 100 MG/ML
30 INJECTION SUBCUTANEOUS 2 TIMES DAILY
Status: DISCONTINUED | OUTPATIENT
Start: 2024-08-30 | End: 2024-09-04

## 2024-08-30 RX ADMIN — MELATONIN 6 MG: at 21:14

## 2024-08-30 RX ADMIN — ASPIRIN 81 MG: 81 TABLET, CHEWABLE ORAL at 09:29

## 2024-08-30 RX ADMIN — PREGABALIN 75 MG: 75 CAPSULE ORAL at 09:11

## 2024-08-30 RX ADMIN — MICONAZOLE NITRATE: 20 CREAM TOPICAL at 09:29

## 2024-08-30 RX ADMIN — QUETIAPINE FUMARATE 25 MG: 25 TABLET ORAL at 18:39

## 2024-08-30 RX ADMIN — CEFEPIME 2000 MG: 2 INJECTION, POWDER, FOR SOLUTION INTRAVENOUS at 00:53

## 2024-08-30 RX ADMIN — VANCOMYCIN HYDROCHLORIDE 1000 MG: 1 INJECTION, POWDER, LYOPHILIZED, FOR SOLUTION INTRAVENOUS at 02:44

## 2024-08-30 RX ADMIN — FERROUS SULFATE TAB 325 MG (65 MG ELEMENTAL FE) 325 MG: 325 (65 FE) TAB at 09:11

## 2024-08-30 RX ADMIN — SODIUM CHLORIDE, PRESERVATIVE FREE 10 ML: 5 INJECTION INTRAVENOUS at 09:13

## 2024-08-30 RX ADMIN — CARVEDILOL 25 MG: 12.5 TABLET, FILM COATED ORAL at 09:10

## 2024-08-30 RX ADMIN — CARVEDILOL 25 MG: 12.5 TABLET, FILM COATED ORAL at 18:39

## 2024-08-30 RX ADMIN — ENOXAPARIN SODIUM 40 MG: 100 INJECTION SUBCUTANEOUS at 09:10

## 2024-08-30 RX ADMIN — ISOSORBIDE MONONITRATE 60 MG: 30 TABLET, EXTENDED RELEASE ORAL at 09:09

## 2024-08-30 RX ADMIN — SODIUM CHLORIDE, PRESERVATIVE FREE 10 ML: 5 INJECTION INTRAVENOUS at 21:15

## 2024-08-30 RX ADMIN — ARFORMOTEROL TARTRATE: 15 SOLUTION RESPIRATORY (INHALATION) at 20:33

## 2024-08-30 RX ADMIN — AMLODIPINE BESYLATE 5 MG: 5 TABLET ORAL at 09:10

## 2024-08-30 RX ADMIN — BUMETANIDE 2 MG: 0.25 INJECTION INTRAMUSCULAR; INTRAVENOUS at 18:39

## 2024-08-30 RX ADMIN — ENOXAPARIN SODIUM 30 MG: 100 INJECTION SUBCUTANEOUS at 21:14

## 2024-08-30 RX ADMIN — MICONAZOLE NITRATE: 20 CREAM TOPICAL at 21:14

## 2024-08-30 RX ADMIN — ACETAMINOPHEN 650 MG: 325 TABLET ORAL at 17:21

## 2024-08-30 RX ADMIN — BUMETANIDE 2 MG: 0.25 INJECTION INTRAMUSCULAR; INTRAVENOUS at 09:11

## 2024-08-30 RX ADMIN — ARFORMOTEROL TARTRATE: 15 SOLUTION RESPIRATORY (INHALATION) at 08:02

## 2024-08-30 RX ADMIN — SODIUM CHLORIDE: 9 INJECTION, SOLUTION INTRAVENOUS at 00:52

## 2024-08-30 RX ADMIN — PANTOPRAZOLE SODIUM 40 MG: 40 TABLET, DELAYED RELEASE ORAL at 06:11

## 2024-08-30 ASSESSMENT — PAIN DESCRIPTION - LOCATION: LOCATION: BACK

## 2024-08-30 ASSESSMENT — PAIN DESCRIPTION - DESCRIPTORS: DESCRIPTORS: ACHING;DISCOMFORT

## 2024-08-30 NOTE — CONSULTS
ORTHOPAEDIC CONSULT NOTE    Subjective:     Date of Consultation:  August 30, 2024      Marilee Oakes is a 73 y.o. female who is being seen for left ankle pain.  She has multiple medical comorbidities and has undergone revision ankle surgeries after ambulating on an ankle fracture which caused significant displacement.  She is now battling wound issues.  Orthopedics consulted for evaluation.    Patient Active Problem List    Diagnosis Date Noted    ESRD (end stage renal disease) on dialysis ([facility]) 05/20/2024    Coronary artery disease due to lipid rich plaque     Closed fracture of multiple ribs of right side with routine healing 08/31/2021    Ingrown toenail of left foot 12/17/2019    Lower extremity edema 12/17/2019    Venous stasis dermatitis of both lower extremities 12/17/2019    Obesity (BMI 30-39.9) 04/30/2019    Chronic cholecystitis 11/16/2018    Ischemic cardiomyopathy 11/13/2018    Long-term use of high-risk medication 01/22/2018    Hypercholesterolemia 01/22/2018    Cellulitis of left lower leg 06/06/2016    Acute heart failure, unspecified heart failure type ([facility]) 08/29/2024    Acute on chronic diastolic heart failure ([facility]) 08/20/2024    Complicated UTI (urinary tract infection) 08/15/2024    S/P ankle arthrodesis 06/28/2024    Valgus foot deformity, acquired, left 06/25/2024    Urinary tract infection without hematuria 06/24/2024    Acute hemorrhoid 06/05/2024    Anemia 05/29/2024    Mitral regurgitation and aortic stenosis 05/20/2024    Unable to care for self 05/11/2024    Steroid-induced hyperglycemia 05/02/2024    Yeast UTI 04/27/2024    Chronic obstructive pulmonary disease ([facility]) 04/27/2024    HILARY (acute kidney injury) ([facility]) 04/25/2024    Hardware complicating wound infection ([facility]) 04/10/2024    Infection of postoperative wound due to methicillin-resistant Staphylococcus aureus 04/07/2024    Surgical site infection 04/07/2024    Simple chronic bronchitis ([facility]) 04/07/2024    Closed bimalleolar 
Dis Child     High Cholesterol Mother        REVIEW OF SYSTEMS:     []         Unable to obtain  ROS due to ---   [x]         Total of 12 systems reviewed as follows:    Constitutional: negative fever, negative chills, negative weight loss  Eyes:   negative visual changes  ENT:   negative sore throat, tongue or lip swelling  Respiratory:  negative cough, negative dyspnea  Cards:  negative for chest pain, palpitations, lower extremity edema  GI:   negative for nausea, vomiting, diarrhea, and abdominal pain  Genitourinary: negative for frequency, dysuria  Integument:  negative for rash   Hematologic:  negative for easy bruising and gum/nose bleeding  Musculoskel: negative for myalgias,  back pain  Neurological:  negative for headaches, dizziness, vertigo, weakness  Behavl/Psych: negative for feelings of anxiety, depression     Pertinent Positives include :    Objective:      Physical Exam:    Last 24hrs VS reviewed since prior progress note. Most recent are:    /74   Pulse 93   Temp 97.3 °F (36.3 °C) (Oral)   Resp 18   SpO2 100%     Intake/Output Summary (Last 24 hours) at 8/30/2024 1710  Last data filed at 8/30/2024 0748  Gross per 24 hour   Intake 800 ml   Output 1420 ml   Net -620 ml        General Appearance: Well developed, well nourished, alert & oriented x 3,    no acute distress.  Ears/Nose/Mouth/Throat: Pupils equal and round, Hearing grossly normal.  Neck: Supple.  JVP within normal limits. Carotids good upstrokes, with no bruit.  Chest: Lungs clear to auscultation bilaterally.  Cardiovascular: Regular rate and rhythm, S1S2 normal, no murmur, rubs, gallops.  Abdomen: Soft, non-tender, bowel sounds are active. No organomegaly.  Extremities: LLE edema worse than RLE.  DUY wrapped.  Femoral pulses +2, Distal Pulses +1.  Skin: Warm and dry.  Neuro: CN II-XII grossly intact, Strength and sensation grossly intact.    []         Post-cath site without hematoma, bruit, tenderness, or thrill.  Distal pulses

## 2024-08-30 NOTE — CARE COORDINATION
Transition of Care Plan:    RUR: 40%  Prior Level of Functioning: needs assistance  Disposition: home with spouse, [facility]   If SNF or IPR: Date FOC offered: spouse declines placement  Follow up appointments: PCP, specialists as indicated   DME needed: owns DME required for d/c   Transportation at discharge: medical transport   IM/IMM Medicare/ letter given: to be given   Is patient a [place] and connected with [facility]? no   If yes, was  transfer form completed and [facility] notified?   Caregiver Contact: spouse  Discharge Caregiver contacted prior to discharge? yes  Care Conference needed? no  Barriers to discharge:  medical clearance    Chart reviewed. CM continuing to follow for discharge planning. Patient unable to participate in PT/OT today. Anticipate possible d/c over the weekend pending progress. VASILE orders submitted to [facility] via [facility]. Pt will need medical transport at time of d/c.     Pt may benefit from cardiology consult due to frequent readmissions and diagnosis of CHF. Spouse reports pt has frequent volume overload, shortness of breath, and chest pain once home.    CHERYLE Domínguez   Care Manager, [facility]  [phone]

## 2024-08-31 LAB
ANION GAP SERPL CALC-SCNC: 2 MMOL/L (ref 5–15)
BASOPHILS # BLD: 0 K/UL (ref 0–0.1)
BASOPHILS NFR BLD: 0 % (ref 0–1)
BUN SERPL-MCNC: 18 MG/DL (ref 6–20)
BUN/CREAT SERPL: 11 (ref 12–20)
CALCIUM SERPL-MCNC: 9 MG/DL (ref 8.5–10.1)
CHLORIDE SERPL-SCNC: 101 MMOL/L (ref 97–108)
CO2 SERPL-SCNC: 39 MMOL/L (ref 21–32)
CREAT SERPL-MCNC: 1.65 MG/DL (ref 0.55–1.02)
DIFFERENTIAL METHOD BLD: ABNORMAL
EKG ATRIAL RATE: 80 BPM
EKG DIAGNOSIS: NORMAL
EKG Q-T INTERVAL: 446 MS
EKG QRS DURATION: 146 MS
EKG QTC CALCULATION (BAZETT): 514 MS
EKG R AXIS: 255 DEGREES
EKG T AXIS: 48 DEGREES
EKG VENTRICULAR RATE: 80 BPM
EOSINOPHIL # BLD: 0.5 K/UL (ref 0–0.4)
EOSINOPHIL NFR BLD: 8 % (ref 0–7)
ERYTHROCYTE [DISTWIDTH] IN BLOOD BY AUTOMATED COUNT: 17.6 % (ref 11.5–14.5)
GLUCOSE SERPL-MCNC: 111 MG/DL (ref 65–100)
HCT VFR BLD AUTO: 31.9 % (ref 35–47)
HGB BLD-MCNC: 9.4 G/DL (ref 11.5–16)
IMM GRANULOCYTES # BLD AUTO: 0 K/UL (ref 0–0.04)
IMM GRANULOCYTES NFR BLD AUTO: 0 % (ref 0–0.5)
LYMPHOCYTES # BLD: 0.8 K/UL (ref 0.8–3.5)
LYMPHOCYTES NFR BLD: 13 % (ref 12–49)
MAGNESIUM SERPL-MCNC: 1.8 MG/DL (ref 1.6–2.4)
MCH RBC QN AUTO: 25.6 PG (ref 26–34)
MCHC RBC AUTO-ENTMCNC: 29.5 G/DL (ref 30–36.5)
MCV RBC AUTO: 86.9 FL (ref 80–99)
MONOCYTES # BLD: 0.6 K/UL (ref 0–1)
MONOCYTES NFR BLD: 9 % (ref 5–13)
NEUTS SEG # BLD: 4.3 K/UL (ref 1.8–8)
NEUTS SEG NFR BLD: 70 % (ref 32–75)
NRBC # BLD: 0 K/UL (ref 0–0.01)
NRBC BLD-RTO: 0 PER 100 WBC
PLATELET # BLD AUTO: 155 K/UL (ref 150–400)
PMV BLD AUTO: 11.4 FL (ref 8.9–12.9)
POTASSIUM SERPL-SCNC: 3.3 MMOL/L (ref 3.5–5.1)
RBC # BLD AUTO: 3.67 M/UL (ref 3.8–5.2)
SODIUM SERPL-SCNC: 142 MMOL/L (ref 136–145)
TROPONIN I SERPL HS-MCNC: 27 NG/L (ref 0–51)
VANCOMYCIN SERPL-MCNC: 19.2 UG/ML
WBC # BLD AUTO: 6.2 K/UL (ref 3.6–11)

## 2024-08-31 PROCEDURE — 97530 THERAPEUTIC ACTIVITIES: CPT

## 2024-08-31 PROCEDURE — 83735 ASSAY OF MAGNESIUM: CPT

## 2024-08-31 PROCEDURE — 6370000000 HC RX 637 (ALT 250 FOR IP): Performed by: STUDENT IN AN ORGANIZED HEALTH CARE EDUCATION/TRAINING PROGRAM

## 2024-08-31 PROCEDURE — 97162 PT EVAL MOD COMPLEX 30 MIN: CPT

## 2024-08-31 PROCEDURE — 93005 ELECTROCARDIOGRAM TRACING: CPT | Performed by: INTERNAL MEDICINE

## 2024-08-31 PROCEDURE — 94761 N-INVAS EAR/PLS OXIMETRY MLT: CPT

## 2024-08-31 PROCEDURE — 84484 ASSAY OF TROPONIN QUANT: CPT

## 2024-08-31 PROCEDURE — 85025 COMPLETE CBC W/AUTO DIFF WBC: CPT

## 2024-08-31 PROCEDURE — 6360000002 HC RX W HCPCS: Performed by: NURSE PRACTITIONER

## 2024-08-31 PROCEDURE — 2580000003 HC RX 258: Performed by: NURSE PRACTITIONER

## 2024-08-31 PROCEDURE — 94640 AIRWAY INHALATION TREATMENT: CPT

## 2024-08-31 PROCEDURE — 2580000003 HC RX 258: Performed by: STUDENT IN AN ORGANIZED HEALTH CARE EDUCATION/TRAINING PROGRAM

## 2024-08-31 PROCEDURE — 6360000002 HC RX W HCPCS: Performed by: STUDENT IN AN ORGANIZED HEALTH CARE EDUCATION/TRAINING PROGRAM

## 2024-08-31 PROCEDURE — 97166 OT EVAL MOD COMPLEX 45 MIN: CPT

## 2024-08-31 PROCEDURE — 2700000000 HC OXYGEN THERAPY PER DAY

## 2024-08-31 PROCEDURE — 36415 COLL VENOUS BLD VENIPUNCTURE: CPT

## 2024-08-31 PROCEDURE — 6370000000 HC RX 637 (ALT 250 FOR IP): Performed by: INTERNAL MEDICINE

## 2024-08-31 PROCEDURE — 80202 ASSAY OF VANCOMYCIN: CPT

## 2024-08-31 PROCEDURE — 80048 BASIC METABOLIC PNL TOTAL CA: CPT

## 2024-08-31 PROCEDURE — 1100000003 HC PRIVATE W/ TELEMETRY

## 2024-08-31 RX ADMIN — ENOXAPARIN SODIUM 30 MG: 100 INJECTION SUBCUTANEOUS at 20:00

## 2024-08-31 RX ADMIN — BUMETANIDE 2 MG: 0.25 INJECTION INTRAMUSCULAR; INTRAVENOUS at 10:35

## 2024-08-31 RX ADMIN — PREGABALIN 75 MG: 75 CAPSULE ORAL at 10:35

## 2024-08-31 RX ADMIN — SODIUM CHLORIDE: 9 INJECTION, SOLUTION INTRAVENOUS at 00:45

## 2024-08-31 RX ADMIN — CARVEDILOL 25 MG: 12.5 TABLET, FILM COATED ORAL at 10:35

## 2024-08-31 RX ADMIN — CEFEPIME 2000 MG: 2 INJECTION, POWDER, FOR SOLUTION INTRAVENOUS at 00:47

## 2024-08-31 RX ADMIN — MELATONIN 6 MG: at 19:42

## 2024-08-31 RX ADMIN — ENOXAPARIN SODIUM 30 MG: 100 INJECTION SUBCUTANEOUS at 10:38

## 2024-08-31 RX ADMIN — MICONAZOLE NITRATE: 20 CREAM TOPICAL at 19:42

## 2024-08-31 RX ADMIN — ACETAMINOPHEN 650 MG: 325 TABLET ORAL at 12:16

## 2024-08-31 RX ADMIN — SODIUM CHLORIDE, PRESERVATIVE FREE 10 ML: 5 INJECTION INTRAVENOUS at 10:38

## 2024-08-31 RX ADMIN — SODIUM CHLORIDE, PRESERVATIVE FREE 10 ML: 5 INJECTION INTRAVENOUS at 19:42

## 2024-08-31 RX ADMIN — AMLODIPINE BESYLATE 5 MG: 5 TABLET ORAL at 10:35

## 2024-08-31 RX ADMIN — BUMETANIDE 2 MG: 0.25 INJECTION INTRAMUSCULAR; INTRAVENOUS at 18:58

## 2024-08-31 RX ADMIN — FERROUS SULFATE TAB 325 MG (65 MG ELEMENTAL FE) 325 MG: 325 (65 FE) TAB at 10:36

## 2024-08-31 RX ADMIN — ARFORMOTEROL TARTRATE: 15 SOLUTION RESPIRATORY (INHALATION) at 08:05

## 2024-08-31 RX ADMIN — ACETAMINOPHEN 650 MG: 325 TABLET ORAL at 21:17

## 2024-08-31 RX ADMIN — MICONAZOLE NITRATE: 20 CREAM TOPICAL at 10:38

## 2024-08-31 RX ADMIN — PANTOPRAZOLE SODIUM 40 MG: 40 INJECTION, POWDER, FOR SOLUTION INTRAVENOUS at 04:24

## 2024-08-31 RX ADMIN — ARFORMOTEROL TARTRATE: 15 SOLUTION RESPIRATORY (INHALATION) at 21:15

## 2024-08-31 RX ADMIN — EMPAGLIFLOZIN 10 MG: 10 TABLET, FILM COATED ORAL at 10:36

## 2024-08-31 RX ADMIN — ACETAMINOPHEN 650 MG: 325 TABLET ORAL at 03:17

## 2024-08-31 RX ADMIN — QUETIAPINE FUMARATE 25 MG: 25 TABLET ORAL at 18:59

## 2024-08-31 RX ADMIN — VANCOMYCIN HYDROCHLORIDE 1000 MG: 1 INJECTION, POWDER, LYOPHILIZED, FOR SOLUTION INTRAVENOUS at 02:21

## 2024-08-31 RX ADMIN — ASPIRIN 81 MG: 81 TABLET, CHEWABLE ORAL at 10:35

## 2024-08-31 RX ADMIN — CARVEDILOL 25 MG: 12.5 TABLET, FILM COATED ORAL at 18:58

## 2024-08-31 RX ADMIN — ISOSORBIDE MONONITRATE 60 MG: 30 TABLET, EXTENDED RELEASE ORAL at 10:35

## 2024-08-31 ASSESSMENT — PAIN DESCRIPTION - ORIENTATION
ORIENTATION: POSTERIOR
ORIENTATION: LEFT
ORIENTATION: LEFT

## 2024-08-31 ASSESSMENT — PAIN - FUNCTIONAL ASSESSMENT
PAIN_FUNCTIONAL_ASSESSMENT: PREVENTS OR INTERFERES WITH MANY ACTIVE NOT PASSIVE ACTIVITIES
PAIN_FUNCTIONAL_ASSESSMENT: PREVENTS OR INTERFERES SOME ACTIVE ACTIVITIES AND ADLS

## 2024-08-31 ASSESSMENT — PAIN DESCRIPTION - LOCATION
LOCATION: BACK;ABDOMEN
LOCATION: FOOT
LOCATION: FOOT

## 2024-08-31 ASSESSMENT — PAIN SCALES - GENERAL
PAINLEVEL_OUTOF10: 4
PAINLEVEL_OUTOF10: 5
PAINLEVEL_OUTOF10: 4
PAINLEVEL_OUTOF10: 5

## 2024-08-31 ASSESSMENT — PAIN DESCRIPTION - DESCRIPTORS
DESCRIPTORS: ACHING;DISCOMFORT
DESCRIPTORS: ACHING
DESCRIPTORS: ACHING;DISCOMFORT

## 2024-09-01 LAB
ANION GAP SERPL CALC-SCNC: 5 MMOL/L (ref 5–15)
BASOPHILS # BLD: 0 K/UL (ref 0–0.1)
BASOPHILS NFR BLD: 0 % (ref 0–1)
BUN SERPL-MCNC: 19 MG/DL (ref 6–20)
BUN/CREAT SERPL: 11 (ref 12–20)
CALCIUM SERPL-MCNC: 9.2 MG/DL (ref 8.5–10.1)
CHLORIDE SERPL-SCNC: 102 MMOL/L (ref 97–108)
CO2 SERPL-SCNC: 36 MMOL/L (ref 21–32)
CREAT SERPL-MCNC: 1.67 MG/DL (ref 0.55–1.02)
DIFFERENTIAL METHOD BLD: ABNORMAL
EOSINOPHIL # BLD: 0.6 K/UL (ref 0–0.4)
EOSINOPHIL NFR BLD: 10 % (ref 0–7)
ERYTHROCYTE [DISTWIDTH] IN BLOOD BY AUTOMATED COUNT: 17.7 % (ref 11.5–14.5)
GLUCOSE SERPL-MCNC: 109 MG/DL (ref 65–100)
HCT VFR BLD AUTO: 34.4 % (ref 35–47)
HGB BLD-MCNC: 10.3 G/DL (ref 11.5–16)
IMM GRANULOCYTES # BLD AUTO: 0 K/UL (ref 0–0.04)
IMM GRANULOCYTES NFR BLD AUTO: 0 % (ref 0–0.5)
LYMPHOCYTES # BLD: 0.9 K/UL (ref 0.8–3.5)
LYMPHOCYTES NFR BLD: 14 % (ref 12–49)
MAGNESIUM SERPL-MCNC: 1.8 MG/DL (ref 1.6–2.4)
MCH RBC QN AUTO: 26.3 PG (ref 26–34)
MCHC RBC AUTO-ENTMCNC: 29.9 G/DL (ref 30–36.5)
MCV RBC AUTO: 87.8 FL (ref 80–99)
MONOCYTES # BLD: 0.6 K/UL (ref 0–1)
MONOCYTES NFR BLD: 10 % (ref 5–13)
NEUTS SEG # BLD: 4 K/UL (ref 1.8–8)
NEUTS SEG NFR BLD: 66 % (ref 32–75)
NRBC # BLD: 0 K/UL (ref 0–0.01)
NRBC BLD-RTO: 0 PER 100 WBC
PLATELET # BLD AUTO: 156 K/UL (ref 150–400)
PMV BLD AUTO: 11.6 FL (ref 8.9–12.9)
POTASSIUM SERPL-SCNC: 3.2 MMOL/L (ref 3.5–5.1)
RBC # BLD AUTO: 3.92 M/UL (ref 3.8–5.2)
SODIUM SERPL-SCNC: 143 MMOL/L (ref 136–145)
WBC # BLD AUTO: 6.1 K/UL (ref 3.6–11)

## 2024-09-01 PROCEDURE — 6360000002 HC RX W HCPCS: Performed by: STUDENT IN AN ORGANIZED HEALTH CARE EDUCATION/TRAINING PROGRAM

## 2024-09-01 PROCEDURE — 80048 BASIC METABOLIC PNL TOTAL CA: CPT

## 2024-09-01 PROCEDURE — 36415 COLL VENOUS BLD VENIPUNCTURE: CPT

## 2024-09-01 PROCEDURE — 94761 N-INVAS EAR/PLS OXIMETRY MLT: CPT

## 2024-09-01 PROCEDURE — 85025 COMPLETE CBC W/AUTO DIFF WBC: CPT

## 2024-09-01 PROCEDURE — 6370000000 HC RX 637 (ALT 250 FOR IP): Performed by: STUDENT IN AN ORGANIZED HEALTH CARE EDUCATION/TRAINING PROGRAM

## 2024-09-01 PROCEDURE — 2580000003 HC RX 258: Performed by: STUDENT IN AN ORGANIZED HEALTH CARE EDUCATION/TRAINING PROGRAM

## 2024-09-01 PROCEDURE — 2700000000 HC OXYGEN THERAPY PER DAY

## 2024-09-01 PROCEDURE — 1100000003 HC PRIVATE W/ TELEMETRY

## 2024-09-01 PROCEDURE — 6370000000 HC RX 637 (ALT 250 FOR IP): Performed by: INTERNAL MEDICINE

## 2024-09-01 PROCEDURE — 83735 ASSAY OF MAGNESIUM: CPT

## 2024-09-01 PROCEDURE — 94640 AIRWAY INHALATION TREATMENT: CPT

## 2024-09-01 RX ADMIN — SODIUM CHLORIDE, PRESERVATIVE FREE 10 ML: 5 INJECTION INTRAVENOUS at 09:35

## 2024-09-01 RX ADMIN — EMPAGLIFLOZIN 10 MG: 10 TABLET, FILM COATED ORAL at 09:35

## 2024-09-01 RX ADMIN — BUMETANIDE 2 MG: 0.25 INJECTION INTRAMUSCULAR; INTRAVENOUS at 09:34

## 2024-09-01 RX ADMIN — PANTOPRAZOLE SODIUM 40 MG: 40 TABLET, DELAYED RELEASE ORAL at 06:31

## 2024-09-01 RX ADMIN — POLYETHYLENE GLYCOL 3350 17 G: 17 POWDER, FOR SOLUTION ORAL at 09:38

## 2024-09-01 RX ADMIN — PREGABALIN 75 MG: 75 CAPSULE ORAL at 09:35

## 2024-09-01 RX ADMIN — SODIUM CHLORIDE, PRESERVATIVE FREE 10 ML: 5 INJECTION INTRAVENOUS at 21:30

## 2024-09-01 RX ADMIN — CARVEDILOL 25 MG: 12.5 TABLET, FILM COATED ORAL at 09:35

## 2024-09-01 RX ADMIN — ASPIRIN 81 MG: 81 TABLET, CHEWABLE ORAL at 09:35

## 2024-09-01 RX ADMIN — CEFEPIME 2000 MG: 2 INJECTION, POWDER, FOR SOLUTION INTRAVENOUS at 00:25

## 2024-09-01 RX ADMIN — FERROUS SULFATE TAB 325 MG (65 MG ELEMENTAL FE) 325 MG: 325 (65 FE) TAB at 09:35

## 2024-09-01 RX ADMIN — ACETAMINOPHEN 650 MG: 325 TABLET ORAL at 09:36

## 2024-09-01 RX ADMIN — MICONAZOLE NITRATE: 20 CREAM TOPICAL at 21:37

## 2024-09-01 RX ADMIN — ENOXAPARIN SODIUM 30 MG: 100 INJECTION SUBCUTANEOUS at 21:30

## 2024-09-01 RX ADMIN — BUMETANIDE 2 MG: 0.25 INJECTION INTRAMUSCULAR; INTRAVENOUS at 18:00

## 2024-09-01 RX ADMIN — SODIUM CHLORIDE: 9 INJECTION, SOLUTION INTRAVENOUS at 00:24

## 2024-09-01 RX ADMIN — ACETAMINOPHEN 650 MG: 325 TABLET ORAL at 21:43

## 2024-09-01 RX ADMIN — VANCOMYCIN HYDROCHLORIDE 750 MG: 750 INJECTION, POWDER, LYOPHILIZED, FOR SOLUTION INTRAVENOUS at 02:30

## 2024-09-01 RX ADMIN — MELATONIN 6 MG: at 21:30

## 2024-09-01 RX ADMIN — ENOXAPARIN SODIUM 30 MG: 100 INJECTION SUBCUTANEOUS at 09:34

## 2024-09-01 RX ADMIN — ONDANSETRON 4 MG: 2 INJECTION INTRAMUSCULAR; INTRAVENOUS at 09:38

## 2024-09-01 RX ADMIN — QUETIAPINE FUMARATE 25 MG: 25 TABLET ORAL at 18:03

## 2024-09-01 RX ADMIN — ISOSORBIDE MONONITRATE 60 MG: 30 TABLET, EXTENDED RELEASE ORAL at 09:35

## 2024-09-01 RX ADMIN — ARFORMOTEROL TARTRATE: 15 SOLUTION RESPIRATORY (INHALATION) at 20:59

## 2024-09-01 RX ADMIN — MICONAZOLE NITRATE: 20 CREAM TOPICAL at 09:37

## 2024-09-01 RX ADMIN — AMLODIPINE BESYLATE 5 MG: 5 TABLET ORAL at 09:35

## 2024-09-01 ASSESSMENT — PAIN SCALES - GENERAL
PAINLEVEL_OUTOF10: 3
PAINLEVEL_OUTOF10: 5

## 2024-09-01 ASSESSMENT — PAIN DESCRIPTION - LOCATION
LOCATION: ABDOMEN
LOCATION: BUTTOCKS

## 2024-09-01 ASSESSMENT — PAIN DESCRIPTION - DESCRIPTORS: DESCRIPTORS: DISCOMFORT

## 2024-09-02 LAB
ANION GAP SERPL CALC-SCNC: 5 MMOL/L (ref 5–15)
BACTERIA SPEC CULT: NORMAL
BACTERIA SPEC CULT: NORMAL
BUN SERPL-MCNC: 22 MG/DL (ref 6–20)
BUN/CREAT SERPL: 13 (ref 12–20)
CALCIUM SERPL-MCNC: 9.1 MG/DL (ref 8.5–10.1)
CHLORIDE SERPL-SCNC: 102 MMOL/L (ref 97–108)
CO2 SERPL-SCNC: 36 MMOL/L (ref 21–32)
CREAT SERPL-MCNC: 1.68 MG/DL (ref 0.55–1.02)
GLUCOSE SERPL-MCNC: 103 MG/DL (ref 65–100)
MAGNESIUM SERPL-MCNC: 1.8 MG/DL (ref 1.6–2.4)
POTASSIUM SERPL-SCNC: 2.9 MMOL/L (ref 3.5–5.1)
SERVICE CMNT-IMP: NORMAL
SERVICE CMNT-IMP: NORMAL
SODIUM SERPL-SCNC: 143 MMOL/L (ref 136–145)

## 2024-09-02 PROCEDURE — 6370000000 HC RX 637 (ALT 250 FOR IP): Performed by: STUDENT IN AN ORGANIZED HEALTH CARE EDUCATION/TRAINING PROGRAM

## 2024-09-02 PROCEDURE — 6360000002 HC RX W HCPCS: Performed by: STUDENT IN AN ORGANIZED HEALTH CARE EDUCATION/TRAINING PROGRAM

## 2024-09-02 PROCEDURE — 94761 N-INVAS EAR/PLS OXIMETRY MLT: CPT

## 2024-09-02 PROCEDURE — 36415 COLL VENOUS BLD VENIPUNCTURE: CPT

## 2024-09-02 PROCEDURE — 2580000003 HC RX 258: Performed by: STUDENT IN AN ORGANIZED HEALTH CARE EDUCATION/TRAINING PROGRAM

## 2024-09-02 PROCEDURE — 2580000003 HC RX 258: Performed by: INTERNAL MEDICINE

## 2024-09-02 PROCEDURE — 1100000003 HC PRIVATE W/ TELEMETRY

## 2024-09-02 PROCEDURE — 83735 ASSAY OF MAGNESIUM: CPT

## 2024-09-02 PROCEDURE — 6360000002 HC RX W HCPCS: Performed by: INTERNAL MEDICINE

## 2024-09-02 PROCEDURE — 94640 AIRWAY INHALATION TREATMENT: CPT

## 2024-09-02 PROCEDURE — 80048 BASIC METABOLIC PNL TOTAL CA: CPT

## 2024-09-02 PROCEDURE — 6370000000 HC RX 637 (ALT 250 FOR IP): Performed by: INTERNAL MEDICINE

## 2024-09-02 PROCEDURE — 2700000000 HC OXYGEN THERAPY PER DAY

## 2024-09-02 RX ORDER — MAGNESIUM SULFATE 1 G/100ML
1000 INJECTION INTRAVENOUS ONCE
Status: COMPLETED | OUTPATIENT
Start: 2024-09-02 | End: 2024-09-02

## 2024-09-02 RX ADMIN — ENOXAPARIN SODIUM 30 MG: 100 INJECTION SUBCUTANEOUS at 11:11

## 2024-09-02 RX ADMIN — ACETAMINOPHEN 650 MG: 325 TABLET ORAL at 03:57

## 2024-09-02 RX ADMIN — SODIUM CHLORIDE, PRESERVATIVE FREE 10 ML: 5 INJECTION INTRAVENOUS at 22:18

## 2024-09-02 RX ADMIN — AMLODIPINE BESYLATE 5 MG: 5 TABLET ORAL at 11:11

## 2024-09-02 RX ADMIN — POTASSIUM BICARBONATE 40 MEQ: 782 TABLET, EFFERVESCENT ORAL at 17:34

## 2024-09-02 RX ADMIN — MICONAZOLE NITRATE: 20 CREAM TOPICAL at 11:18

## 2024-09-02 RX ADMIN — FERROUS SULFATE TAB 325 MG (65 MG ELEMENTAL FE) 325 MG: 325 (65 FE) TAB at 11:27

## 2024-09-02 RX ADMIN — ACETAMINOPHEN 650 MG: 325 TABLET ORAL at 18:05

## 2024-09-02 RX ADMIN — ENOXAPARIN SODIUM 30 MG: 100 INJECTION SUBCUTANEOUS at 22:18

## 2024-09-02 RX ADMIN — QUETIAPINE FUMARATE 25 MG: 25 TABLET ORAL at 18:07

## 2024-09-02 RX ADMIN — ISOSORBIDE MONONITRATE 60 MG: 30 TABLET, EXTENDED RELEASE ORAL at 11:10

## 2024-09-02 RX ADMIN — MAGNESIUM SULFATE HEPTAHYDRATE 1000 MG: 1 INJECTION, SOLUTION INTRAVENOUS at 11:17

## 2024-09-02 RX ADMIN — EMPAGLIFLOZIN 10 MG: 10 TABLET, FILM COATED ORAL at 11:10

## 2024-09-02 RX ADMIN — BUMETANIDE 2 MG: 0.25 INJECTION INTRAMUSCULAR; INTRAVENOUS at 11:11

## 2024-09-02 RX ADMIN — PREGABALIN 75 MG: 75 CAPSULE ORAL at 11:10

## 2024-09-02 RX ADMIN — MELATONIN 6 MG: at 22:18

## 2024-09-02 RX ADMIN — CARVEDILOL 25 MG: 12.5 TABLET, FILM COATED ORAL at 11:27

## 2024-09-02 RX ADMIN — CEFEPIME 2000 MG: 2 INJECTION, POWDER, FOR SOLUTION INTRAVENOUS at 00:55

## 2024-09-02 RX ADMIN — BUMETANIDE 2 MG: 0.25 INJECTION INTRAMUSCULAR; INTRAVENOUS at 18:14

## 2024-09-02 RX ADMIN — ARFORMOTEROL TARTRATE: 15 SOLUTION RESPIRATORY (INHALATION) at 07:58

## 2024-09-02 RX ADMIN — VANCOMYCIN HYDROCHLORIDE 750 MG: 750 INJECTION, POWDER, LYOPHILIZED, FOR SOLUTION INTRAVENOUS at 02:22

## 2024-09-02 RX ADMIN — ARFORMOTEROL TARTRATE: 15 SOLUTION RESPIRATORY (INHALATION) at 19:28

## 2024-09-02 RX ADMIN — PANTOPRAZOLE SODIUM 40 MG: 40 TABLET, DELAYED RELEASE ORAL at 06:30

## 2024-09-02 RX ADMIN — SODIUM CHLORIDE, PRESERVATIVE FREE 10 ML: 5 INJECTION INTRAVENOUS at 11:17

## 2024-09-02 RX ADMIN — ASPIRIN 81 MG: 81 TABLET, CHEWABLE ORAL at 11:11

## 2024-09-02 ASSESSMENT — PAIN DESCRIPTION - DESCRIPTORS
DESCRIPTORS: ACHING
DESCRIPTORS: ACHING;DISCOMFORT

## 2024-09-02 ASSESSMENT — PAIN DESCRIPTION - LOCATION
LOCATION: ARM
LOCATION: HEAD;NECK

## 2024-09-02 ASSESSMENT — PAIN DESCRIPTION - ORIENTATION
ORIENTATION: RIGHT;LEFT
ORIENTATION: POSTERIOR

## 2024-09-02 ASSESSMENT — PAIN SCALES - GENERAL
PAINLEVEL_OUTOF10: 3
PAINLEVEL_OUTOF10: 5

## 2024-09-03 LAB
ANION GAP SERPL CALC-SCNC: 3 MMOL/L (ref 5–15)
BASOPHILS # BLD: 0 K/UL (ref 0–0.1)
BASOPHILS NFR BLD: 0 % (ref 0–1)
BUN SERPL-MCNC: 23 MG/DL (ref 6–20)
BUN/CREAT SERPL: 13 (ref 12–20)
CALCIUM SERPL-MCNC: 8.7 MG/DL (ref 8.5–10.1)
CHLORIDE SERPL-SCNC: 102 MMOL/L (ref 97–108)
CO2 SERPL-SCNC: 37 MMOL/L (ref 21–32)
CREAT SERPL-MCNC: 1.8 MG/DL (ref 0.55–1.02)
DIFFERENTIAL METHOD BLD: ABNORMAL
EOSINOPHIL # BLD: 0.5 K/UL (ref 0–0.4)
EOSINOPHIL NFR BLD: 10 % (ref 0–7)
ERYTHROCYTE [DISTWIDTH] IN BLOOD BY AUTOMATED COUNT: 17.4 % (ref 11.5–14.5)
GLUCOSE SERPL-MCNC: 107 MG/DL (ref 65–100)
HCT VFR BLD AUTO: 34.4 % (ref 35–47)
HGB BLD-MCNC: 10.4 G/DL (ref 11.5–16)
IMM GRANULOCYTES # BLD AUTO: 0 K/UL (ref 0–0.04)
IMM GRANULOCYTES NFR BLD AUTO: 0 % (ref 0–0.5)
LYMPHOCYTES # BLD: 1 K/UL (ref 0.8–3.5)
LYMPHOCYTES NFR BLD: 19 % (ref 12–49)
MAGNESIUM SERPL-MCNC: 1.9 MG/DL (ref 1.6–2.4)
MCH RBC QN AUTO: 26.3 PG (ref 26–34)
MCHC RBC AUTO-ENTMCNC: 30.2 G/DL (ref 30–36.5)
MCV RBC AUTO: 86.9 FL (ref 80–99)
MONOCYTES # BLD: 0.4 K/UL (ref 0–1)
MONOCYTES NFR BLD: 7 % (ref 5–13)
NEUTS SEG # BLD: 3.4 K/UL (ref 1.8–8)
NEUTS SEG NFR BLD: 64 % (ref 32–75)
NRBC # BLD: 0 K/UL (ref 0–0.01)
NRBC BLD-RTO: 0 PER 100 WBC
PLATELET # BLD AUTO: 155 K/UL (ref 150–400)
PMV BLD AUTO: 11.3 FL (ref 8.9–12.9)
POTASSIUM SERPL-SCNC: 3 MMOL/L (ref 3.5–5.1)
RBC # BLD AUTO: 3.96 M/UL (ref 3.8–5.2)
SODIUM SERPL-SCNC: 142 MMOL/L (ref 136–145)
WBC # BLD AUTO: 5.4 K/UL (ref 3.6–11)

## 2024-09-03 PROCEDURE — 83735 ASSAY OF MAGNESIUM: CPT

## 2024-09-03 PROCEDURE — 2580000003 HC RX 258: Performed by: STUDENT IN AN ORGANIZED HEALTH CARE EDUCATION/TRAINING PROGRAM

## 2024-09-03 PROCEDURE — 36415 COLL VENOUS BLD VENIPUNCTURE: CPT

## 2024-09-03 PROCEDURE — 6370000000 HC RX 637 (ALT 250 FOR IP): Performed by: STUDENT IN AN ORGANIZED HEALTH CARE EDUCATION/TRAINING PROGRAM

## 2024-09-03 PROCEDURE — 2580000003 HC RX 258: Performed by: INTERNAL MEDICINE

## 2024-09-03 PROCEDURE — 6360000002 HC RX W HCPCS: Performed by: INTERNAL MEDICINE

## 2024-09-03 PROCEDURE — 85025 COMPLETE CBC W/AUTO DIFF WBC: CPT

## 2024-09-03 PROCEDURE — 1100000003 HC PRIVATE W/ TELEMETRY

## 2024-09-03 PROCEDURE — 80048 BASIC METABOLIC PNL TOTAL CA: CPT

## 2024-09-03 PROCEDURE — 2700000000 HC OXYGEN THERAPY PER DAY

## 2024-09-03 PROCEDURE — 6360000002 HC RX W HCPCS: Performed by: STUDENT IN AN ORGANIZED HEALTH CARE EDUCATION/TRAINING PROGRAM

## 2024-09-03 PROCEDURE — 6370000000 HC RX 637 (ALT 250 FOR IP): Performed by: INTERNAL MEDICINE

## 2024-09-03 PROCEDURE — 94640 AIRWAY INHALATION TREATMENT: CPT

## 2024-09-03 PROCEDURE — 94761 N-INVAS EAR/PLS OXIMETRY MLT: CPT

## 2024-09-03 RX ORDER — BUMETANIDE 1 MG/1
2 TABLET ORAL 2 TIMES DAILY
Status: DISCONTINUED | OUTPATIENT
Start: 2024-09-03 | End: 2024-09-04 | Stop reason: HOSPADM

## 2024-09-03 RX ORDER — POTASSIUM CHLORIDE 750 MG/1
40 TABLET, EXTENDED RELEASE ORAL ONCE
Status: COMPLETED | OUTPATIENT
Start: 2024-09-03 | End: 2024-09-03

## 2024-09-03 RX ADMIN — ENOXAPARIN SODIUM 30 MG: 100 INJECTION SUBCUTANEOUS at 10:09

## 2024-09-03 RX ADMIN — PREGABALIN 75 MG: 75 CAPSULE ORAL at 10:08

## 2024-09-03 RX ADMIN — CARVEDILOL 25 MG: 12.5 TABLET, FILM COATED ORAL at 17:33

## 2024-09-03 RX ADMIN — ARFORMOTEROL TARTRATE: 15 SOLUTION RESPIRATORY (INHALATION) at 20:12

## 2024-09-03 RX ADMIN — ISOSORBIDE MONONITRATE 60 MG: 30 TABLET, EXTENDED RELEASE ORAL at 10:08

## 2024-09-03 RX ADMIN — CEFEPIME 2000 MG: 2 INJECTION, POWDER, FOR SOLUTION INTRAVENOUS at 00:18

## 2024-09-03 RX ADMIN — POTASSIUM CHLORIDE 40 MEQ: 750 TABLET, FILM COATED, EXTENDED RELEASE ORAL at 10:08

## 2024-09-03 RX ADMIN — BUMETANIDE 2 MG: 0.25 INJECTION INTRAMUSCULAR; INTRAVENOUS at 10:09

## 2024-09-03 RX ADMIN — ENOXAPARIN SODIUM 30 MG: 100 INJECTION SUBCUTANEOUS at 20:49

## 2024-09-03 RX ADMIN — MICONAZOLE NITRATE: 20 CREAM TOPICAL at 20:50

## 2024-09-03 RX ADMIN — QUETIAPINE FUMARATE 25 MG: 25 TABLET ORAL at 17:34

## 2024-09-03 RX ADMIN — SODIUM CHLORIDE, PRESERVATIVE FREE 10 ML: 5 INJECTION INTRAVENOUS at 20:50

## 2024-09-03 RX ADMIN — MICONAZOLE NITRATE: 20 CREAM TOPICAL at 09:30

## 2024-09-03 RX ADMIN — FERROUS SULFATE TAB 325 MG (65 MG ELEMENTAL FE) 325 MG: 325 (65 FE) TAB at 10:08

## 2024-09-03 RX ADMIN — MELATONIN 6 MG: at 20:49

## 2024-09-03 RX ADMIN — ONDANSETRON 4 MG: 2 INJECTION INTRAMUSCULAR; INTRAVENOUS at 06:10

## 2024-09-03 RX ADMIN — ARFORMOTEROL TARTRATE: 15 SOLUTION RESPIRATORY (INHALATION) at 08:50

## 2024-09-03 RX ADMIN — CARVEDILOL 25 MG: 12.5 TABLET, FILM COATED ORAL at 10:08

## 2024-09-03 RX ADMIN — AMLODIPINE BESYLATE 5 MG: 5 TABLET ORAL at 10:08

## 2024-09-03 RX ADMIN — BUMETANIDE 2 MG: 1 TABLET ORAL at 17:33

## 2024-09-03 RX ADMIN — SODIUM CHLORIDE, PRESERVATIVE FREE 5 ML: 5 INJECTION INTRAVENOUS at 11:03

## 2024-09-03 RX ADMIN — EMPAGLIFLOZIN 10 MG: 10 TABLET, FILM COATED ORAL at 10:08

## 2024-09-03 RX ADMIN — ASPIRIN 81 MG: 81 TABLET, CHEWABLE ORAL at 10:08

## 2024-09-03 RX ADMIN — VANCOMYCIN HYDROCHLORIDE 750 MG: 750 INJECTION, POWDER, LYOPHILIZED, FOR SOLUTION INTRAVENOUS at 02:53

## 2024-09-03 RX ADMIN — ACETAMINOPHEN 650 MG: 325 TABLET ORAL at 00:16

## 2024-09-03 ASSESSMENT — PAIN SCALES - WONG BAKER
WONGBAKER_NUMERICALRESPONSE: NO HURT
WONGBAKER_NUMERICALRESPONSE: NO HURT

## 2024-09-03 ASSESSMENT — PAIN DESCRIPTION - LOCATION: LOCATION: BACK

## 2024-09-03 ASSESSMENT — PAIN SCALES - GENERAL
PAINLEVEL_OUTOF10: 0
PAINLEVEL_OUTOF10: 2
PAINLEVEL_OUTOF10: 3

## 2024-09-03 ASSESSMENT — PAIN DESCRIPTION - ORIENTATION: ORIENTATION: RIGHT;LEFT

## 2024-09-04 VITALS
BODY MASS INDEX: 30.27 KG/M2 | RESPIRATION RATE: 20 BRPM | TEMPERATURE: 98.4 F | WEIGHT: 205 LBS | DIASTOLIC BLOOD PRESSURE: 76 MMHG | OXYGEN SATURATION: 98 % | SYSTOLIC BLOOD PRESSURE: 131 MMHG | HEART RATE: 81 BPM

## 2024-09-04 LAB
ANION GAP SERPL CALC-SCNC: 3 MMOL/L (ref 5–15)
BASOPHILS # BLD: 0 K/UL (ref 0–0.1)
BASOPHILS NFR BLD: 0 % (ref 0–1)
BUN SERPL-MCNC: 22 MG/DL (ref 6–20)
BUN/CREAT SERPL: 12 (ref 12–20)
CALCIUM SERPL-MCNC: 9.6 MG/DL (ref 8.5–10.1)
CHLORIDE SERPL-SCNC: 104 MMOL/L (ref 97–108)
CO2 SERPL-SCNC: 35 MMOL/L (ref 21–32)
CREAT SERPL-MCNC: 1.81 MG/DL (ref 0.55–1.02)
DIFFERENTIAL METHOD BLD: ABNORMAL
EOSINOPHIL # BLD: 0.5 K/UL (ref 0–0.4)
EOSINOPHIL NFR BLD: 9 % (ref 0–7)
ERYTHROCYTE [DISTWIDTH] IN BLOOD BY AUTOMATED COUNT: 17.7 % (ref 11.5–14.5)
GLUCOSE SERPL-MCNC: 116 MG/DL (ref 65–100)
HCT VFR BLD AUTO: 35.9 % (ref 35–47)
HGB BLD-MCNC: 10.7 G/DL (ref 11.5–16)
IMM GRANULOCYTES # BLD AUTO: 0.1 K/UL (ref 0–0.04)
IMM GRANULOCYTES NFR BLD AUTO: 1 % (ref 0–0.5)
LYMPHOCYTES # BLD: 0.8 K/UL (ref 0.8–3.5)
LYMPHOCYTES NFR BLD: 13 % (ref 12–49)
MCH RBC QN AUTO: 26.3 PG (ref 26–34)
MCHC RBC AUTO-ENTMCNC: 29.8 G/DL (ref 30–36.5)
MCV RBC AUTO: 88.2 FL (ref 80–99)
MONOCYTES # BLD: 0.4 K/UL (ref 0–1)
MONOCYTES NFR BLD: 7 % (ref 5–13)
NEUTS SEG # BLD: 4.2 K/UL (ref 1.8–8)
NEUTS SEG NFR BLD: 70 % (ref 32–75)
NRBC # BLD: 0 K/UL (ref 0–0.01)
NRBC BLD-RTO: 0 PER 100 WBC
PLATELET # BLD AUTO: 166 K/UL (ref 150–400)
PMV BLD AUTO: 11.3 FL (ref 8.9–12.9)
POTASSIUM SERPL-SCNC: 4.5 MMOL/L (ref 3.5–5.1)
RBC # BLD AUTO: 4.07 M/UL (ref 3.8–5.2)
RBC MORPH BLD: ABNORMAL
SODIUM SERPL-SCNC: 142 MMOL/L (ref 136–145)
WBC # BLD AUTO: 6 K/UL (ref 3.6–11)

## 2024-09-04 PROCEDURE — 2580000003 HC RX 258: Performed by: INTERNAL MEDICINE

## 2024-09-04 PROCEDURE — 2580000003 HC RX 258: Performed by: STUDENT IN AN ORGANIZED HEALTH CARE EDUCATION/TRAINING PROGRAM

## 2024-09-04 PROCEDURE — 6360000002 HC RX W HCPCS: Performed by: INTERNAL MEDICINE

## 2024-09-04 PROCEDURE — 94640 AIRWAY INHALATION TREATMENT: CPT

## 2024-09-04 PROCEDURE — 2700000000 HC OXYGEN THERAPY PER DAY

## 2024-09-04 PROCEDURE — 85025 COMPLETE CBC W/AUTO DIFF WBC: CPT

## 2024-09-04 PROCEDURE — 80048 BASIC METABOLIC PNL TOTAL CA: CPT

## 2024-09-04 PROCEDURE — 6370000000 HC RX 637 (ALT 250 FOR IP): Performed by: INTERNAL MEDICINE

## 2024-09-04 PROCEDURE — 6360000002 HC RX W HCPCS: Performed by: STUDENT IN AN ORGANIZED HEALTH CARE EDUCATION/TRAINING PROGRAM

## 2024-09-04 PROCEDURE — 36415 COLL VENOUS BLD VENIPUNCTURE: CPT

## 2024-09-04 PROCEDURE — 6370000000 HC RX 637 (ALT 250 FOR IP): Performed by: STUDENT IN AN ORGANIZED HEALTH CARE EDUCATION/TRAINING PROGRAM

## 2024-09-04 PROCEDURE — 94761 N-INVAS EAR/PLS OXIMETRY MLT: CPT

## 2024-09-04 RX ORDER — BUMETANIDE 2 MG/1
2 TABLET ORAL 2 TIMES DAILY
Qty: 30 TABLET | Refills: 1 | Status: SHIPPED | OUTPATIENT
Start: 2024-09-04

## 2024-09-04 RX ORDER — ENOXAPARIN SODIUM 100 MG/ML
40 INJECTION SUBCUTANEOUS DAILY
Status: DISCONTINUED | OUTPATIENT
Start: 2024-09-04 | End: 2024-09-04 | Stop reason: HOSPADM

## 2024-09-04 RX ORDER — DOXYCYCLINE HYCLATE 100 MG
100 TABLET ORAL 2 TIMES DAILY
Qty: 10 TABLET | Refills: 0 | Status: SHIPPED | OUTPATIENT
Start: 2024-09-04 | End: 2024-09-09

## 2024-09-04 RX ADMIN — ARFORMOTEROL TARTRATE: 15 SOLUTION RESPIRATORY (INHALATION) at 07:40

## 2024-09-04 RX ADMIN — ASPIRIN 81 MG: 81 TABLET, CHEWABLE ORAL at 08:31

## 2024-09-04 RX ADMIN — ACETAMINOPHEN 650 MG: 325 TABLET ORAL at 10:53

## 2024-09-04 RX ADMIN — BUMETANIDE 2 MG: 1 TABLET ORAL at 08:31

## 2024-09-04 RX ADMIN — PANTOPRAZOLE SODIUM 40 MG: 40 TABLET, DELAYED RELEASE ORAL at 06:26

## 2024-09-04 RX ADMIN — CARVEDILOL 25 MG: 12.5 TABLET, FILM COATED ORAL at 08:31

## 2024-09-04 RX ADMIN — EMPAGLIFLOZIN 10 MG: 10 TABLET, FILM COATED ORAL at 08:31

## 2024-09-04 RX ADMIN — AMLODIPINE BESYLATE 5 MG: 5 TABLET ORAL at 08:31

## 2024-09-04 RX ADMIN — MICONAZOLE NITRATE: 20 CREAM TOPICAL at 08:33

## 2024-09-04 RX ADMIN — ISOSORBIDE MONONITRATE 60 MG: 30 TABLET, EXTENDED RELEASE ORAL at 08:31

## 2024-09-04 RX ADMIN — FERROUS SULFATE TAB 325 MG (65 MG ELEMENTAL FE) 325 MG: 325 (65 FE) TAB at 08:31

## 2024-09-04 RX ADMIN — ENOXAPARIN SODIUM 40 MG: 100 INJECTION SUBCUTANEOUS at 08:31

## 2024-09-04 RX ADMIN — PREGABALIN 75 MG: 75 CAPSULE ORAL at 08:31

## 2024-09-04 RX ADMIN — CEFEPIME 2000 MG: 2 INJECTION, POWDER, FOR SOLUTION INTRAVENOUS at 01:34

## 2024-09-04 RX ADMIN — SODIUM CHLORIDE, PRESERVATIVE FREE 10 ML: 5 INJECTION INTRAVENOUS at 08:17

## 2024-09-04 ASSESSMENT — PAIN SCALES - GENERAL
PAINLEVEL_OUTOF10: 0
PAINLEVEL_OUTOF10: 5

## 2024-09-04 ASSESSMENT — PAIN DESCRIPTION - DESCRIPTORS: DESCRIPTORS: SORE

## 2024-09-04 ASSESSMENT — PAIN DESCRIPTION - FREQUENCY: FREQUENCY: INTERMITTENT

## 2024-09-04 ASSESSMENT — PAIN DESCRIPTION - ORIENTATION: ORIENTATION: POSTERIOR

## 2024-09-04 ASSESSMENT — PAIN DESCRIPTION - PAIN TYPE: TYPE: ACUTE PAIN

## 2024-09-04 ASSESSMENT — PAIN - FUNCTIONAL ASSESSMENT: PAIN_FUNCTIONAL_ASSESSMENT: ACTIVITIES ARE NOT PREVENTED

## 2024-09-04 ASSESSMENT — PAIN DESCRIPTION - LOCATION: LOCATION: NECK

## 2024-09-04 NOTE — PROGRESS NOTES
Hospitalist Progress Note    NAME:   Marilee Oakes   : 1950   MRN: [id number]     Date/Time: 2024 9:20 AM  Patient PCP: Bel Pereira APRN - CNP    Estimated discharge date: 2024  Barriers: Needs resolution of swelling    Assessment / Plan:  Acute diastolic heart failure exacerbation  CAD  Essential hypertension and hyperlipidemia  Admitted to telemetry  BNP 2493  Chest x-ray on 2024-diffuse pulm edema bilateral effusions unchanged  Recent echo on 2024--normal LV size and function ejection fraction 55 to 60%.  Twice daily Bumex to be continued  Strict intake and output with daily weights  Patient is negative by 950 mL - last shift  Monitor electrolytes and replace as indicated  Elevate BLE  Continue PTA amlodipine, carvedilol, Imdur     COPD with chronic hypoxemic respiratory failure 2-3 L requirement  Formulary substitution arformoterol budesonide nebulizer  DuoNebs every 4 hours as needed     Possible recurrent left ankle infection  Elevated CRP  History left ankle ORIF complicated by MRSA  Lactic acid 1.34  CRP 4.36  Sedimentation rate 41  Continue empiric vancomycin and cefepime  Wound care consulted  Consulted [facility]     Intertrigo  Formulary substitution miconazole      Chronic hypernatremia  Trend sodium     CKD 3, baseline  Trend renal function  Renally dose medications and avoid nephrotoxic agents     Normocytic anemia, at baseline  Trend hemoglobin     GERD  Continue PTA Protonix     History sundowning  Dementia  Delirium precautions  Melatonin nightly  Continue PTA Seroquel    Mild chronic anemia  Hemoglobin stable     Medical Decision Making:   I personally reviewed labs: CBC BMP  I personally reviewed imaging:Chest x-ray on 2024-diffuse pulm edema bilateral effusions unchanged  Toxic drug monitoring: IV diuretics, electrolytes-Daily BMP  Discussed case with: Patient     Code Status: Full  DVT Prophylaxis: Lovenox  GI Prophylaxis: 
      Hospitalist Progress Note    NAME:   Marilee Oakes   : 1950   MRN: [id number]     Date/Time: 2024 9:23 AM  Patient PCP: Bel Pereira APRN - CNP    Estimated discharge date: 9/3/2024  Barriers: Cardiology clearance    Assessment / Plan:    Acute diastolic heart failure exacerbation  CAD  Essential hypertension and hyperlipidemia  Admitted to telemetry  BNP 2493  Chest x-ray on 2024-diffuse pulm edema bilateral effusions unchanged  Recent echo on 2024--normal LV size and function ejection fraction 55 to 60%.  Strict intake and output with daily weights  Continue PTA amlodipine, carvedilol, Imdur  Weight 223lbs on , was 240lbs .  No weight today - will request  Breathing better.  Continue bumex IV 2mg BID.  Depending on weight, can likely transition back to PO bumex.   Continue oxygen  Monitor electrolytes and replace as indicated    COPD with chronic hypoxemic respiratory failure 2-3 L requirement  Formulary substitution arformoterol budesonide nebulizer  DuoNebs every 4 hours as needed     Possible recurrent left ankle infection  Elevated CRP  History left ankle ORIF complicated by MRSA  Lactic acid 1.34  CRP 4.36  Sedimentation rate 41  Blood cultures on 828 negative   Continue empiric vancomycin and cefepime  Wound care consulted   Ortho consulted.  No obvious cellulitis.  Continue CAM boot. Continue NWB on the LLE pending future office FU w/ Dr Chan      Intertrigo  Formulary substitution miconazole      Mild Hypernatremia  -now normal     CKD 3, baseline  Trend renal function  Renally dose medications and avoid nephrotoxic agents     Normocytic anemia, at baseline  Trend hemoglobin     GERD  Continue PTA Protonix     History sundowning  Dementia  Delirium precautions  Melatonin nightly  Continue PTA Seroquel    Mild chronic anemia  Hemoglobin stable     Medical Decision Making:   I personally reviewed labs: CBC BMP  I personally reviewed imaging: X-ray left ankle done 
      Hospitalist Progress Note    NAME:   Marilee Oakes   : 1950   MRN: [id number]     Date/Time: 2024 8:30 AM  Patient PCP: Bel Pereira APRN - CNP    Estimated discharge date: 2024  Barriers: Needs resolution of swelling, PT OT    Assessment / Plan:  Acute diastolic heart failure exacerbation  CAD  Essential hypertension and hyperlipidemia  Admitted to telemetry  BNP 2493  Chest x-ray on 2024-diffuse pulm edema bilateral effusions unchanged  Recent echo on 2024--normal LV size and function ejection fraction 55 to 60%.  Twice daily Bumex to be continued  Strict intake and output with daily weights  Patient is negative by 320 mL - last shift  Monitor electrolytes and replace as indicated  Elevate BLE  Continue PTA amlodipine, carvedilol, Imdur     COPD with chronic hypoxemic respiratory failure 2-3 L requirement  Formulary substitution arformoterol budesonide nebulizer  DuoNebs every 4 hours as needed     Possible recurrent left ankle infection  Elevated CRP  History left ankle ORIF complicated by MRSA  Lactic acid 1.34  CRP 4.36  Sedimentation rate 41  Blood cultures on 828 negative  Continue empiric vancomycin and cefepime  Wound care consulted  Consulted [facility]-discussed with [facility].  Will find out about the weightbearing status on that extremity.     Intertrigo  Formulary substitution miconazole      Chronic hypernatremia  Trend sodium     CKD 3, baseline  Trend renal function  Renally dose medications and avoid nephrotoxic agents     Normocytic anemia, at baseline  Trend hemoglobin     GERD  Continue PTA Protonix     History sundowning  Dementia  Delirium precautions  Melatonin nightly  Continue PTA Seroquel    Mild chronic anemia  Hemoglobin stable     Medical Decision Making:   I personally reviewed labs: CBC BMP  I personally reviewed imaging: X-ray left ankle done on 4Diffuse soft tissue swelling. Osteopenia and 
      Hospitalist Progress Note    NAME:   Marilee Oakes   : 1950   MRN: [id number]     Date/Time: 2024 10:31 AM  Patient PCP: Bel Pereira APRN - CNP    Estimated discharge date: 2024  Barriers: Cardiology clearance    Assessment / Plan:    Acute diastolic heart failure exacerbation  CAD  Essential hypertension and hyperlipidemia  Admitted to telemetry  BNP 2493  Chest x-ray on 2024-diffuse pulm edema bilateral effusions unchanged  Recent echo on 2024--normal LV size and function ejection fraction 55 to 60%.  Strict intake and output with daily weights  Continue PTA amlodipine, carvedilol, Imdur  Weight 223lbs on , was 240lbs .  No weight today - will request  Breathing better.  Continue bumex IV 2mg BID.  Depending on weight, can likely transition back to PO bumex.   Continue oxygen  Monitor electrolytes and replace as indicated    COPD with chronic hypoxemic respiratory failure 2-3 L requirement  Formulary substitution arformoterol budesonide nebulizer  DuoNebs every 4 hours as needed     Possible recurrent left ankle infection  Elevated CRP  History left ankle ORIF complicated by MRSA  Lactic acid 1.34  CRP 4.36  Sedimentation rate 41  Blood cultures on 828 negative   Continue empiric vancomycin and cefepime  Wound care consulted   Ortho consulted.  No obvious cellulitis.  Continue CAM boot. Continue NWB on the LLE pending future office FU w/ Dr Chan      Intertrigo  Formulary substitution miconazole      Mild Hypernatremia  -now normal     CKD 3, baseline  Trend renal function  Renally dose medications and avoid nephrotoxic agents     Normocytic anemia, at baseline  Trend hemoglobin     GERD  Continue PTA Protonix     History sundowning  Dementia  Delirium precautions  Melatonin nightly  Continue PTA Seroquel    Mild chronic anemia  Hemoglobin stable     Medical Decision Making:   I personally reviewed labs: CBC BMP  I personally reviewed imaging: X-ray left ankle done 
      Hospitalist Progress Note    NAME:   Marilee Oakes   : 1950   MRN: [id number]     Date/Time: 9/3/2024 3:35 PM  Patient PCP: Bel Pereira APRN - CNP    Estimated discharge date:   Barriers: Improvement of ankle cellulitis    Assessment / Plan:    Acute diastolic heart failure exacerbation  CAD  Essential hypertension and hyperlipidemia  Admitted to telemetry  BNP 2493  Chest x-ray on 2024-diffuse pulm edema bilateral effusions unchanged  Recent echo on 2024--normal LV size and function ejection fraction 55 to 60%.  Strict intake and output with daily weights  Continue PTA amlodipine, carvedilol, Imdur  Weight 223lbs on , was 240lbs .  No weight today - will request  Breathing better.  Continue bumex IV 2mg BID.  Depending on weight, can likely transition back to PO bumex.   Continue oxygen  Monitor electrolytes and replace as indicated  9/3: Change IV Bumex to p.o.  Continue Coreg.  Strict I's and O's, daily weights and low-salt diet.      COPD with chronic hypoxemic respiratory failure 2-3 L requirement  Formulary substitution arformoterol budesonide nebulizer  DuoNebs every 4 hours as needed     Possible recurrent left ankle infection  Elevated CRP  History left ankle ORIF complicated by MRSA  Lactic acid 1.34  CRP 4.36  Sedimentation rate 41  Blood cultures on 828 negative   Continue empiric vancomycin and cefepime  Wound care to follow.  Ortho consulted.  No obvious cellulitis.  Continue CAM boot. Continue NWB on the LLE pending future office FU w/ Dr Chan     Hypokalemia  -KCl replaced.  Recheck in a.m.     Intertrigo  Formulary substitution miconazole      Mild Hypernatremia  -now normal     CKD 3, baseline  Trend renal function  Renally dose medications and avoid nephrotoxic agents     Normocytic anemia, at baseline  Trend hemoglobin     GERD  Continue PTA Protonix     History sundowning  Dementia  Delirium precautions  Melatonin nightly  Continue PTA Seroquel    Mild 
      Hospitalist Progress Note    NAME:   Marilee Oakes   : 1950   MRN: [id number]     Date/Time: 2024 3:35 PM  Patient PCP: Bel Pereira APRN - CNP    Estimated discharge date: 2024  Barriers: Needs resolution of swelling, PT OT    Assessment / Plan:    Acute diastolic heart failure exacerbation  CAD  Essential hypertension and hyperlipidemia  Admitted to telemetry  BNP 2493  Chest x-ray on 2024-diffuse pulm edema bilateral effusions unchanged  Recent echo on 2024--normal LV size and function ejection fraction 55 to 60%.  Strict intake and output with daily weights  Continue PTA amlodipine, carvedilol, Imdur  Weight 223lbs on , was 240lbs .  No weight today - will request  Breathing better.  Continue bumex IV 2mg BID.  Depending on weight, can likely transition back to PO bumex.   Continue oxygen  Monitor electrolytes and replace as indicated    COPD with chronic hypoxemic respiratory failure 2-3 L requirement  Formulary substitution arformoterol budesonide nebulizer  DuoNebs every 4 hours as needed     Possible recurrent left ankle infection  Elevated CRP  History left ankle ORIF complicated by MRSA  Lactic acid 1.34  CRP 4.36  Sedimentation rate 41  Blood cultures on 828 negative   Continue empiric vancomycin and cefepime  Wound care consulted   Ortho consulted.  No obvious cellulitis.  Continue CAM boot. Continue NWB on the LLE pending future office FU w/ Dr Chan      Intertrigo  Formulary substitution miconazole      Mild Hypernatremia  -now normal     CKD 3, baseline  Trend renal function  Renally dose medications and avoid nephrotoxic agents     Normocytic anemia, at baseline  Trend hemoglobin     GERD  Continue PTA Protonix     History sundowning  Dementia  Delirium precautions  Melatonin nightly  Continue PTA Seroquel    Mild chronic anemia  Hemoglobin stable     Medical Decision Making:   I personally reviewed labs: CBC BMP  I personally reviewed imaging: X-ray 
.End of Shift Note    Bedside shift change report given to tex lowery (oncoming nurse) by Dinora Troy RN (offgoing nurse).  Report included the following information SBAR, ED Summary, Intake/Output, Recent Results, Med Rec Status, Quality Measures, and Dual Neuro Assessment    Shift worked:  7a-7p     Shift summary and any significant changes:     Cx2 turn , pt have BM 1 time   No complain      Concerns for physician to address:  -     Zone phone for oncoming shift:   [phone]       Activity:     Number times ambulated in hallways past shift: 0  Number of times OOB to chair past shift: 0    Cardiac:   Cardiac Monitoring: Yes           Access:  Current line(s): PIV     Genitourinary:   Urinary status: voiding and external catheter    Respiratory:      Chronic home O2 use?: YES  Incentive spirometer at bedside: YES       GI:     Current diet:  ADULT DIET; Regular; 4 carb choices (60 gm/meal); Low Fat/Low Chol/High Fiber/KAREEM; 1500 ml  Passing flatus: YES  Tolerating current diet: YES       Pain Management:   Patient states pain is manageable on current regimen: YES    Skin:     Interventions: turn team, float heels, increase time out of bed, internal/external urinary devices, and nutritional support    Patient Safety:  Fall Score:    Interventions: bed/chair alarm, assistive device (walker, cane. etc), gripper socks, pt to call before getting OOB, and stay with me (per policy)       Length of Stay:  Expected LOS: 3  Actual LOS: 0      Dinora Troy RN                           
Chart reviewed for PCP hospital follow up. Patient's ASHANTI is currently 9/4/24. [facility] can schedule the appt when patient is clinically ready to discharge. [facility] placed [facility] information AVS for patient resource. Pending patient discharge.   
End of Shift Note     Bedside shift change report given to Dinora ALARCON (oncoming nurse) by Mabel Perez RN (offgoing nurse).  Report included the following information SBAR, Intake/Output, MAR, Recent Results, and Cardiac Rhythm  V Paced  At handoff report pt is awake in bed.   Shift worked:  NIGHT       Shift summary and any significant changes:        0404H RN informed NP Kimberly that  \"pt is confused. She just started complaining of chest pain now, whenever I ask her again she's gonna say it's her abdomen and her back that is hurting. (we've been repositioning her) I'm not really sure about her chest pain because she's confused. VS are stable. I just did an EKG, I took a picture pls see media. AM labs are also in\"     0413H NP ordered protonix IV x1 dose, Provider is familiar with the patient and says she has h/o reflux. She also reviewed the EKG. Troponin added.   ------------    -Labs drawn  -turning done, heels floated  -unable to obtain pt weight, she declined to try getting up and she is using and old bed.       Concerns for physician to address:       Zone phone for oncoming shift:   [phone]         Activity:  Number times ambulated in hallways past shift: 0  Number of times OOB to chair past shift: 0     Cardiac:   Cardiac Monitoring: Yes         Access:  Current line(s): PIV      Genitourinary:   Urinary status: voiding and external catheter     Respiratory:   Chronic home O2 use?: YES  Incentive spirometer at bedside: YES     GI:  Current diet:  ADULT DIET; Regular; 4 carb choices (60 gm/meal); Low Fat/Low Chol/High Fiber/KAREEM; 1500 ml  Passing flatus: YES  Tolerating current diet: YES     Pain Management:   Patient states pain is manageable on current regimen: YES     Skin:  Interventions: turn team, float heels, and internal/external urinary devices    Patient Safety:  Fall Score:    Interventions: bed/chair alarm, assistive device (walker, cane. etc), gripper socks, pt to call before getting OOB, and 
End of Shift Note     Bedside shift change report given to Dinora ALARCON (oncoming nurse) by Mabel Perez RN (offgoing nurse).  Report included the following information SBAR, Intake/Output, MAR, Recent Results, and Cardiac Rhythm  V Paced  At handoff report pt is awake in bed.   Shift worked:  NIGHT       Shift summary and any significant changes:      -Pt slept most of the night.   -Turning done, Heels floated   -Labs drawn   -tele-sitter at bedside          Concerns for physician to address:        Zone phone for oncoming shift:   [phone]         Activity:  Number times ambulated in hallways past shift: 0  Number of times OOB to chair past shift: 0     Cardiac:   Cardiac Monitoring: Yes         Access:  Current line(s): PIV      Genitourinary:   Urinary status: voiding and external catheter     Respiratory:   Chronic home O2 use?: YES  Incentive spirometer at bedside: YES     GI:  Current diet:  ADULT DIET; Regular; 4 carb choices (60 gm/meal); Low Fat/Low Chol/High Fiber/KAREEM; 1500 ml  Passing flatus: YES  Tolerating current diet: YES     Pain Management:   Patient states pain is manageable on current regimen: YES     Skin:  Interventions: turn team, float heels, and internal/external urinary devices    Patient Safety:  Fall Score:    Interventions: bed/chair alarm, assistive device (walker, cane. etc), gripper socks, pt to call before getting OOB, and stay with me (per policy)     Length of Stay:  Expected LOS: 3  Actual LOS: 1        Mabel Perez RN  
End of Shift Note    0800 Bedside shift change report given to AGNES Farias (oncoming nurse) by CHERYL JAMES RN (offgoing nurse).  Report included the following information SBAR REPORTS LIST: SBAR, Intake/Output, MAR, Recent Results, and Cardiac Rhythm AV paced    Shift worked:  5026-1518     Shift summary and any significant changes:     Patient settled in well. On 2 litres of oxygen ,no labs for this morning. Patient refused  to take protonix this morning and it was wasted.     Concerns for physician to address:  None     Zone phone for oncoming shift:   [phone]           CHERYL JAMES, AGNES                            
End of Shift Note    Bedside shift change report given to Acacia ALARCON (oncoming nurse) by Noelle Esparza RN (offgoing nurse).  Report included the following information SBAR, Kardex, Intake/Output, MAR, Recent Results, and Med Rec Status    Shift worked:  1900- 0700     Shift summary and any significant changes:     Patient had a fair night, PT,OT     Concerns for physician to address:  NONE     Zone phone for oncoming shift:   [phone]       Activity:     Number times ambulated in hallways past shift: 0  Number of times OOB to chair past shift: 0    Cardiac:   Cardiac Monitoring: Yes           Access:  Current line(s): PIV     Genitourinary:   Urinary status: voiding and external catheter    Respiratory:      Chronic home O2 use?: YES  Incentive spirometer at bedside: YES       GI:     Current diet:  ADULT DIET; Regular; 4 carb choices (60 gm/meal); Low Fat/Low Chol/High Fiber/KAREEM; 1500 ml  Passing flatus: YES  Tolerating current diet: YES       Pain Management:   Patient states pain is manageable on current regimen: YES    Skin:     Interventions: increase time out of bed and PT/OT consult    Patient Safety:  Fall Score:    Interventions: bed/chair alarm, gripper socks, and pt to call before getting OOB       Length of Stay:  Expected LOS: 7  Actual LOS: 6      Noelle Esparza RN                           
End of Shift Note    Bedside shift change report given to Dinora ALARCON (oncoming nurse) by Dinora Troy RN (offgoing nurse).  Report included the following information SBAR, Intake/Output, MAR, Recent Results, and Cardiac Rhythm  V Paced  At handoff report pt is awake in bed.   Shift worked:  7a-7pm      Shift summary and any significant changes:    Turning done, Heels floated  Dressing changed   Cardio consult done      Concerns for physician to address:  -     Zone phone for oncoming shift:   [phone]       Activity:     Number times ambulated in hallways past shift: 0  Number of times OOB to chair past shift: 0    Cardiac:   Cardiac Monitoring: Yes           Access:  Current line(s): PIV     Genitourinary:   Urinary status: voiding and external catheter    Respiratory:      Chronic home O2 use?: YES  Incentive spirometer at bedside: YES       GI:     Current diet:  ADULT DIET; Regular; 4 carb choices (60 gm/meal); Low Fat/Low Chol/High Fiber/KAREEM; 1500 ml  Passing flatus: YES  Tolerating current diet: YES       Pain Management:   Patient states pain is manageable on current regimen: YES    Skin:     Interventions: turn team, float heels, and internal/external urinary devices    Patient Safety:  Fall Score:    Interventions: bed/chair alarm, assistive device (walker, cane. etc), gripper socks, pt to call before getting OOB, and stay with me (per policy)       Length of Stay:  Expected LOS: 3  Actual LOS: 1      Dinora Troy RN                           
End of Shift Note    Bedside shift change report given to Dinora ALARCON (oncoming nurse) by Mabel Perez RN (offgoing nurse).  Report included the following information SBAR, Intake/Output, MAR, Recent Results, and Cardiac Rhythm  V Paced  At handoff report pt is awake in bed.   Shift worked:  NIGHT      Shift summary and any significant changes:     -Pt slept most of the night, with some episodes of confusion   -Turning done, Heels floated   -Labs drawn        Concerns for physician to address:       Zone phone for oncoming shift:   [phone]       Activity:     Number times ambulated in hallways past shift: 0  Number of times OOB to chair past shift: 0    Cardiac:   Cardiac Monitoring: Yes           Access:  Current line(s): PIV     Genitourinary:   Urinary status: voiding and external catheter    Respiratory:      Chronic home O2 use?: YES  Incentive spirometer at bedside: YES       GI:     Current diet:  ADULT DIET; Regular; 4 carb choices (60 gm/meal); Low Fat/Low Chol/High Fiber/KAREEM; 1500 ml  Passing flatus: YES  Tolerating current diet: YES       Pain Management:   Patient states pain is manageable on current regimen: YES    Skin:     Interventions: turn team, float heels, and internal/external urinary devices    Patient Safety:  Fall Score:    Interventions: bed/chair alarm, assistive device (walker, cane. etc), gripper socks, pt to call before getting OOB, and stay with me (per policy)       Length of Stay:  Expected LOS: 3  Actual LOS: 1      Mabel Perez RN                           
End of Shift Note    Bedside shift change report given to Mabel lowery RN (oncoming nurse) by Dinora Troy RN (offgoing nurse).  Report included the following information SBAR, Intake/Output, MAR, Recent Results, and Cardiac Rhythm  V Paced  At handoff report pt is awake in bed.   Shift worked:  7a-7pm      Shift summary and any significant changes:    Qx2 Turn done, Heels floated  Dressing changed   Bmx 2   Standing weight - 98.3 kg   Hold  evening BP med. , soft BP , notified to attending BP .   Concerns for physician to address: Regarding modify lyrica med. As home dose .   Regarding evening bp    Zone phone for oncoming shift:   [phone]       Activity:     Number times ambulated in hallways past shift: 0  Number of times OOB to chair past shift: 0    Cardiac:   Cardiac Monitoring: Yes           Access:  Current line(s): PIV     Genitourinary:   Urinary status: voiding and external catheter    Respiratory:      Chronic home O2 use?: YES  Incentive spirometer at bedside: YES       GI:     Current diet:  ADULT DIET; Regular; 4 carb choices (60 gm/meal); Low Fat/Low Chol/High Fiber/KAREEM; 1500 ml  Passing flatus: YES  Tolerating current diet: YES       Pain Management:   Patient states pain is manageable on current regimen: YES    Skin:     Interventions: turn team, float heels, and internal/external urinary devices    Patient Safety:  Fall Score:    Interventions: bed/chair alarm, assistive device (walker, cane. etc), gripper socks, pt to call before getting OOB, and stay with me (per policy)       Length of Stay:  Expected LOS: 5  Actual LOS: 3      Dinora Troy RN                           
End of Shift Note    Bedside shift change report given to Mabel lowery RN (oncoming nurse) by Dinora Troy RN (offgoing nurse).  Report included the following information SBAR, Intake/Output, MAR, Recent Results, and Cardiac Rhythm  V Paced  At handoff report pt is awake in bed.   Shift worked:  7a-7pm      Shift summary and any significant changes:    Turning done, Heels floated  Dressing changed   BMX1     Concerns for physician to address:  -     Zone phone for oncoming shift:   [phone]       Activity:     Number times ambulated in hallways past shift: 0  Number of times OOB to chair past shift: 0    Cardiac:   Cardiac Monitoring: Yes           Access:  Current line(s): PIV     Genitourinary:   Urinary status: voiding and external catheter    Respiratory:      Chronic home O2 use?: YES  Incentive spirometer at bedside: YES       GI:     Current diet:  ADULT DIET; Regular; 4 carb choices (60 gm/meal); Low Fat/Low Chol/High Fiber/KAREEM; 1500 ml  Passing flatus: YES  Tolerating current diet: YES       Pain Management:   Patient states pain is manageable on current regimen: YES    Skin:     Interventions: turn team, float heels, and internal/external urinary devices    Patient Safety:  Fall Score:    Interventions: bed/chair alarm, assistive device (walker, cane. etc), gripper socks, pt to call before getting OOB, and stay with me (per policy)       Length of Stay:  Expected LOS: 5  Actual LOS: 2      Dinora Troy RN                           
End of Shift Note    Bedside shift change report given to ning lowery RN (oncoming nurse) by Dinora Troy RN (offgoing nurse).  Report included the following information SBAR, Intake/Output, MAR, Recent Results, and Cardiac Rhythm  V Paced  At handoff report pt is awake in bed.   Shift worked:  7a-7pm      Shift summary and any significant changes:    Qx2 Turn done, Heels floated  Dressing changed   Bmx 2   Standing weight - 93.3 kg   Hold  evening BP med. , soft BP , notified to attending BP .   Concerns for physician to address:   Regarding evening bp    Zone phone for oncoming shift:   [phone]       Activity:     Number times ambulated in hallways past shift: 0  Number of times OOB to chair past shift: 0    Cardiac:   Cardiac Monitoring: Yes           Access:  Current line(s): PIV     Genitourinary:   Urinary status: voiding and external catheter    Respiratory:      Chronic home O2 use?: YES  Incentive spirometer at bedside: YES       GI:     Current diet:  ADULT DIET; Regular; 4 carb choices (60 gm/meal); Low Fat/Low Chol/High Fiber/KAREEM; 1500 ml  Passing flatus: YES  Tolerating current diet: YES       Pain Management:   Patient states pain is manageable on current regimen: YES    Skin:     Interventions: turn team, float heels, and internal/external urinary devices    Patient Safety:  Fall Score:    Interventions: bed/chair alarm, assistive device (walker, cane. etc), gripper socks, pt to call before getting OOB, and stay with me (per policy)       Length of Stay:  Expected LOS: 5  Actual LOS: 4      Dinora Troy RN                           
Hospital follow-up PCP transitional care appointment has been scheduled with Dr. Darien Potter on 9/13/24 1120. This is a previously scheduled appt. [facility] placed [facility] information AVS for patient resource. Pending patient discharge.  Iman De Santiago, Care Management Assistant   
Nursing contacted Nocturnist/cross cover provider via non-urgent messaging system [facility] and notified patient confused, reporting possibly chest pain, but on clarification pt reporting back and abdominal pain per nurse reported, states frequently repositioning pt due to discomfort. No other concerns reported. No acute distress reported. No other information provided by nurse. VSS. Pt received tylenol 0317 per prn orders. EKG done by nursing.    EKG upon my initial review no ischemia or ectopy or changes from prior- pending final cards review. Ordered protonix 40mg iv x1, pt is somewhat familiar to me as she is repeatedly been hospitalized, has a h/o reflux, on protonix po already and in the past for similar complaint has had good response to reflux meds, am labs already done by nursing per ordered by dayshift. Please see cards note. Out of abundance of caution will do add on trop level to the am labs, trop neg this admit and past few trops done as well. Will defer further evaluation/management to the day shift primary attending care team. Patient denies any further complaints or concerns.     Nursing to notify Hospitalist for further/continued concerns. Will remain available overnight for further concerns if nursing/patient needs. Please note, there are RRT systems in this hospital in place that if nursing has acute or critical patient condition change or concern, this is to help facilitate and notify that patient needs immediate bedside evaluation by a provider.     Non-billable note.       
Occupational Therapy    Orders acknowledged, chart reviewed. Pt cleared by RN for therapy. Pt received in bed, lethargic. Pt aroused to therapist voice and able to squeeze hands on command, however, unable to maintain arousal or continue to follow commands in order to participate in OT evaluation. Will follow-up later/tomorrow for formal evaluation as deemed medically appropriate. Thanks.    Carol Malik MS, OTR/L  
Ortho:    Dressing change today-             Minimal drainage noted from lateral aspect- single nylon suture present  No obvious cellulitis  CAM boot at bedside    Pt was NWB since last office FU 7/24----   Continue NWB on the LLE pending future office FU w/ Dr Dustin Vee PA-C    
P&T-Approved DVT Prophylaxis Dosing    Per P&T Committee-approved protocol enoxaparin 40 mg daily has been adjusted to enoxaparin 30 mg BID based on weight and renal function as shown in the table below.         Loren Benitez, [facility]           
Patient refusing to drink dissolvable potassium tablets.  MD notified and asked for RN to have pharmacy change to powder.  Pharmacy stated we don't stock the powder, only tablets or IV.  Waiting on MD response.  
Pharmacy Antimicrobial Kinetic Dosing    Indication for Antimicrobials: SSTI (Possible recurrent ankle infection, Hx left ankle ORIF complicated by MRSA)    Current Regimen of Each Antimicrobial:  Vancomycin Pharmacy to Dose; Start Date ; Day # 1  Cefepime 2g IV q24h; Start Date ; Day # 1    Previous Antimicrobial Therapy:  N/A    Goal Level: Vancomycin trough 15-20    Date Dose & Interval Measured (mcg/mL) Predicted AUC                       Significant Cultures:    Blood, paired: ngtd, prelim    Labs:  Recent Labs     Units 24  2113   CREATININE MG/DL 1.81*   BUN MG/DL 22*   WBC K/uL 5.1     Temp (24hrs), Av.8 °F (37.1 °C), Min:98.5 °F (36.9 °C), Max:99 °F (37.2 °C)    Conditions for Dosing Consideration:  CKD (baseline 1.5-1.7)    Creatinine Clearance (mL/min): Estimated Creatinine Clearance: 37 mL/min (A) (based on SCr of 1.81 mg/dL (H)).     Impression/Plan:   Vancomycin 2500mg IV loading dose administered  Ordered vancomycin 1000mg IV q24h for a projected   Level scheduled for  0100  BMP ordered daily   Antimicrobial stop date 5 days     Pharmacy will follow daily and adjust medications as appropriate for renal function and/or serum levels.    Thank you,  VENUS MAYA [facility]   
Pharmacy Antimicrobial Kinetic Dosing    Indication for Antimicrobials: SSTI (Possible recurrent ankle infection, Hx left ankle ORIF complicated by MRSA)    Current Regimen of Each Antimicrobial:  Vancomycin Pharmacy to Dose; Start Date ; Day # 2  Cefepime 2g IV q24h; Start Date ; Day # 2    Goal Level: Vancomycin trough 15-20    Date Dose & Interval Measured (mcg/mL) Predicted AUC                       Significant Cultures:    Blood, paired: ngtd, prelim    Labs:  Recent Labs     Units 24  0330 24  2113   CREATININE MG/DL 1.69* 1.81*   BUN MG/DL 20 22*   WBC K/uL 5.5 5.1     Temp (24hrs), Av.4 °F (36.9 °C), Min:97.3 °F (36.3 °C), Max:99.4 °F (37.4 °C)    Conditions for Dosing Consideration:  CKD (baseline 1.5-1.7)    Creatinine Clearance (mL/min): Estimated Creatinine Clearance: 40 mL/min (A) (based on SCr of 1.69 mg/dL (H)).     Impression/Plan:   Continue vancomycin 1000 mg IV q24h for . Vancomycin level scheduled for  0100  Cefepime as above   BMP ordered daily   Antimicrobial stop date 5 days     Pharmacy will follow daily and adjust medications as appropriate for renal function and/or serum levels.    Thank you,  Loren Benitez [facility]     
Pharmacy Antimicrobial Kinetic Dosing    Indication for Antimicrobials: SSTI (Possible recurrent ankle infection, Hx left ankle ORIF complicated by MRSA)    Current Regimen of Each Antimicrobial:  Vancomycin Pharmacy to Dose; Start Date ; Day # 3  Cefepime 2g IV q24h; Start Date ; Day # 3    Goal Level: Vancomycin trough 15-20    Date Dose & Interval Measured (mcg/mL) Predicted AUC     00:35 1000 mg q24h 19.2 605                 Significant Cultures:    Blood, paired: ngtd, prelim    Labs:  Recent Labs     Units 24  0035 24  0330 24  2113   CREATININE MG/DL 1.65* 1.69* 1.81*   BUN MG/DL 18 20 22*   WBC K/uL 6.2 5.5 5.1     Temp (24hrs), Av.5 °F (36.9 °C), Min:97.7 °F (36.5 °C), Max:99.7 °F (37.6 °C)    Conditions for Dosing Consideration:  CKD (baseline 1.5-1.7)    Creatinine Clearance (mL/min): Estimated Creatinine Clearance: 41 mL/min (A) (based on SCr of 1.65 mg/dL (H)).     Impression/Plan:   The vancomycin level resulted at 19.2mcg/ml (AUC supratherapeutic 605). Will adjust the dose to 750 mg IV q24h.   Cefepime as above   BMP ordered daily   Antimicrobial stop date 5 days     Pharmacy will follow daily and adjust medications as appropriate for renal function and/or serum levels.    Thank you,  Loren Benitez, [facility]       
Physical Therapy     Orders received and acknowledged, chart reviewed. Attempted to see patient for PT evaluation, pt lethargic, unable to maintain eyes open despite verbal/tactile/kinesthetic stimuli and not following commands; not appropriate to participate in therapy at this time. PT will continue to follow as medically appropriate and available.     Thank you,  ALEKSEY SALAZAR, PT, DPT    
Report given to AGNES Farias.  
Spiritual Health Assessment/Progress Note  [facility]    Initial Encounter,  ,  ,      Name: Marilee Oakes MRN: [id number]    Age: 73 y.o.     Sex: female   Language: English   Anabaptism: Muslim   Acute heart failure, unspecified heart failure type (HCC)     Date: 8/31/2024            Total Time Calculated: 20 min              Spiritual Assessment began in [facility] 3 MED TELE        Referral/Consult From: Nurse   Encounter Overview/Reason: Initial Encounter  Service Provided For: Patient    Hemalatha, Belief, Meaning:   Patient identifies as spiritual: patient shared that she is a Rastafarian  Family/Friends No family/friends present      Importance and Influence:  Patient has spiritual/personal beliefs that influence decisions regarding their health  Family/Friends no family/friends present    Community:  Patient Other: Unable to assess  Family/Friends Other:      Assessment and Plan of Care:     Patient Interventions include: Facilitated expression of thoughts and feelings/Patient was calling out \"help me\" and the nurse was helping her with medicine for her pain. I asked if patient had a particular hemalatha tradition. (She shared that she is Rastafarian - Hoahaoism.) I offered a prayer of comfort, inviting God's presence, assurance and God's blessings over Ms. Oakes. She thanked me. She shared she cannot get comfortable. (The nurse shared that she would help adjust Ms. Oakes in the bed with the pillows behind her.) I thanked the nurse.   Family/Friends Interventions include: Other:      Patient Plan of Care: Spiritual Care available upon further referral  Family/Friends Plan of Care: Other:      Electronically signed by Chaplain CIERRA on 8/31/2024 at 3:45 AM   
[facility]     Progress Note      9/1/2024 9:28 AM  NAME: Marilee Oakes   MRN:  [id number]   Admit Diagnosis: Acute respiratory distress [R06.03]  Acute pulmonary edema (HCC) [J81.0]  Cellulitis of left lower extremity [L03.116]  Acute heart failure, unspecified heart failure type (HCC) [I50.9]               Assessment:       Dyspnea.  CXR with edema.  Left ankle pain     Problem list:   Ms Oakes has a h/o:  1) CAD (Prox LAD KAY 4/2014 for NQWMI), Neg PET for ischemia 9/2018. Min CAD 2/2019.  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  Entresto was too expensive.          *EF 15-20% 9/2018.  EF 30% w/ minimal CAD at cath 2/2019. EF 20-25% (m-mod MR; mod TR) 8/2019.          *EF 35-40% 8/2020 (admit for CHF: too much salt).         *EF 40-45% 2/2021 (@ [facility] for CHF, off some meds after kyphoplasty/low BPs)          *EF 50% 1/2022 (AS mild w/ mean 16).  3) HTN,   4) AFIB: PAfib (RFA 4/2016 and 1/2017), Recurrent/persistent afib 2017/2018: AV node ablation 10/2018.        *implant of the 24 mm Watchman device in the anterior chicken wing DEMARIO 10/2021.        *AC stopped 11/2021 after JUAN MANUEL showed no leak around Watchman device.  5) St Sudhakar PPM 7/2014 for bradycardia while on therapy for AFib: changed to BiV ICD 10/2018.  6) Dyslipidemia   ( labs per PCP).     7) CKD: Aldactone stopped in 2014. Cr 2 11/2018 (after mary): Cr 1.5 12/2018 & 8/2019;            *Cr 1.4/gfr 39 12/2020. Cr 1.9/gfr 26 3/2021 (avoiding ACEi/ARB). (Dr. Evangelista)  8) DM  9) nephrolithiasis  10) LEELA (on CPAP),   11) cholecystectomy 11/2018.  12) LE venous insufficiency:  B SFV ablation 3/2020.  Compression stockings/wraps changed to pump (1 hr bid).  Obesity: 262# 9/2019. 270 to 281# 2020. 279# 8/2020; 281# 12/2020. 257# 3/2021. 233# 2023.     Recent MRSA bacteremia 12/2023. JUAN MANUEL without evidence of bacterial endocarditis.      Hospital admission in 1/ 2024 for acute on chronic systolic heart 
[facility]     Progress Note      9/2/2024 2:30 PM  NAME: Marilee Oakes   MRN:  [id number]   Admit Diagnosis: Acute respiratory distress [R06.03]  Acute pulmonary edema (HCC) [J81.0]  Cellulitis of left lower extremity [L03.116]  Acute heart failure, unspecified heart failure type (HCC) [I50.9]               Assessment:       Dyspnea.  CXR with edema.  Left ankle pain     Problem list:   Ms Oakes has a h/o:  1) CAD (Prox LAD KAY 4/2014 for NQWMI), Neg PET for ischemia 9/2018. Min CAD 2/2019.  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  Entresto was too expensive.          *EF 15-20% 9/2018.  EF 30% w/ minimal CAD at cath 2/2019. EF 20-25% (m-mod MR; mod TR) 8/2019.          *EF 35-40% 8/2020 (admit for CHF: too much salt).         *EF 40-45% 2/2021 (@ [facility]CV for CHF, off some meds after kyphoplasty/low BPs)          *EF 50% 1/2022 (AS mild w/ mean 16).  3) HTN,   4) AFIB: PAfib (RFA 4/2016 and 1/2017), Recurrent/persistent afib 2017/2018: AV node ablation 10/2018.        *implant of the 24 mm Watchman device in the anterior chicken wing DEMARIO 10/2021.        *AC stopped 11/2021 after JUAN MANUEL showed no leak around Watchman device.  5) St Sudhakar PPM 7/2014 for bradycardia while on therapy for AFib: changed to BiV ICD 10/2018.  6) Dyslipidemia   ( labs per PCP).     7) CKD: Aldactone stopped in 2014. Cr 2 11/2018 (after mary): Cr 1.5 12/2018 & 8/2019;            *Cr 1.4/gfr 39 12/2020. Cr 1.9/gfr 26 3/2021 (avoiding ACEi/ARB). (Dr. Evangelista)  8) DM  9) nephrolithiasis  10) LEELA (on CPAP),   11) cholecystectomy 11/2018.  12) LE venous insufficiency:  B SFV ablation 3/2020.  Compression stockings/wraps changed to pump (1 hr bid).  Obesity: 262# 9/2019. 270 to 281# 2020. 279# 8/2020; 281# 12/2020. 257# 3/2021. 233# 2023.     Recent MRSA bacteremia 12/2023. JUAN MANUEL without evidence of bacterial endocarditis.      Hospital admission in 1/ 2024 for acute on chronic systolic heart 
input(s): \"TP\", \"GLOB\", \"GGT\" in the last 72 hours.    Invalid input(s): \"SGOT\", \"GPT\", \"AP\", \"TBIL\", \"ALB\", \"AML\", \"AMYP\", \"LPSE\", \"HLPSE\"    No results for input(s): \"PH\", \"PCO2\", \"PO2\" in the last 72 hours.    Medications Personally Reviewed:    Current Facility-Administered Medications   Medication Dose Route Frequency    enoxaparin Sodium (LOVENOX) injection 30 mg  30 mg SubCUTAneous BID    empagliflozin (JARDIANCE) tablet 10 mg  10 mg Oral Daily    amLODIPine (NORVASC) tablet 5 mg  5 mg Oral Daily    aspirin chewable tablet 81 mg  81 mg Oral Daily    arformoterol 15 mcg-budesonide 0.5 mg neb solution   Nebulization BID RT    carvedilol (COREG) tablet 25 mg  25 mg Oral BID with meals    ferrous sulfate (IRON 325) tablet 325 mg  325 mg Oral Daily with breakfast    isosorbide mononitrate (IMDUR) extended release tablet 60 mg  60 mg Oral Daily    melatonin tablet 6 mg  6 mg Oral Nightly    miconazole (MICOTIN) 2 % cream   Topical BID    pantoprazole (PROTONIX) tablet 40 mg  40 mg Oral QAM AC    pregabalin (LYRICA) capsule 75 mg  75 mg Oral Daily    QUEtiapine (SEROQUEL) tablet 25 mg  25 mg Oral QPM    sodium chloride (OCEAN) 0.65 % nasal spray 2 spray  2 spray Nasal Q8H PRN    sodium chloride flush 0.9 % injection 5-40 mL  5-40 mL IntraVENous 2 times per day    sodium chloride flush 0.9 % injection 5-40 mL  5-40 mL IntraVENous PRN    0.9 % sodium chloride infusion   IntraVENous PRN    ondansetron (ZOFRAN-ODT) disintegrating tablet 4 mg  4 mg Oral Q8H PRN    Or    ondansetron (ZOFRAN) injection 4 mg  4 mg IntraVENous Q6H PRN    polyethylene glycol (GLYCOLAX) packet 17 g  17 g Oral Daily PRN    acetaminophen (TYLENOL) tablet 650 mg  650 mg Oral Q6H PRN    Or    acetaminophen (TYLENOL) suppository 650 mg  650 mg Rectal Q6H PRN    bumetanide (BUMEX) injection 2 mg  2 mg IntraVENous BID    ipratropium 0.5 mg-albuterol 2.5 mg (DUONEB) nebulizer solution 1 Dose  1 Dose Inhalation Q4H PRN    ceFEPIme (MAXIPIME) 2,000 mg 
(DUONEB) nebulizer solution 1 Dose  1 Dose Inhalation Q4H PRN    ceFEPIme (MAXIPIME) 2,000 mg in sodium chloride 0.9 % 100 mL IVPB (mini-bag)  2,000 mg IntraVENous Q24H         Shravan Rodriguez MD

## 2024-09-04 NOTE — CARE COORDINATION
Transition of Care Plan:     RUR: 40%    Prior Level of Functioning: needs assistance    Disposition: Home with spouse and [facility]     2:35 PM   CM checked [facility] Board and ETA is now 3:37 PM     11:43 AM  [facility] ETA 2 PM   CM requested 1 PM and they have been coming early.  Please have Pt ready around 1-2 PM.     11:20 AM   Noted DC order.   CM sent updates to [facility] and made appt on [facility]. [place] tool given to RN.   CM talked to Pt Spouse and he is home and ready to receive Pt this afternoon.     If SNF or IPR: Date FOC offered: spouse declines placement  Follow up appointments: PCP, specialists as indicated   DME needed: owns DME required for d/c   Transportation at discharge: medical transport   IM/IMM Medicare/ letter given: to be given   Is patient a  and connected with VA? no              If yes, was [place] transfer form completed and [facility] notified?   Caregiver Contact: spouse  Discharge Caregiver contacted prior to discharge? yes  Care Conference needed? no  Barriers to discharge:      Jaclyn Haro CM  [id number]       09/04/24 1117   Discharge Planning   Type of Residence House   Living Arrangements Spouse/Significant Other   Current Services Prior To Admission None   Potential Assistance Needed Home Care   DME Ordered? No   Potential Assistance Purchasing Medications No   Type of Home Care Services PT;OT;Nursing Services   Patient expects to be discharged to: House   Services At/After Discharge   Transition of Care Consult (CM Consult) Home Health  ([place])   Internal Home Health No   Reason Outside Agency Chosen Patient already serviced by other home care/hospice agency   Services At/After Discharge Home Health   Condition of Participation: Discharge Planning   The Plan for Transition of Care is related to the following treatment goals: Home with [facility]   The Patient and/or Patient Representative was provided with a Choice of Provider? Patient   Name of the

## 2024-09-04 NOTE — DISCHARGE SUMMARY
them to see when they will be out.    Preston Pham MD  [address] [facility] [address] [phone]    Schedule an appointment as soon as possible for a visit in 1 week(s)  For pancreatic          Total time in minutes spent coordinating this discharge (includes going over instructions, follow-up, prescriptions, and preparing report for sign off to her PCP) :  35 minutes

## 2024-09-04 NOTE — DISCHARGE INSTRUCTIONS
Patient Discharge Instructions    Marilee Oakes / [id number] : 1950    Admitted 2024 Discharged: 2024         DISCHARGE DIAGNOSIS:   Acute diastolic heart failure exacerbation  CAD  Essential hypertension and hyperlipidemia  COPD with chronic hypoxemic respiratory failure 2-3 L requirement  Possible recurrent left ankle infection  Elevated CRP  History left ankle ORIF complicated by MRSA  Hypokalemia  Intertrigo  Mild Hypernatremia  CKD 3, baseline  Normocytic anemia, at baseline  GERD  History sundowning  Mild chronic anemia      Take Home Medications     {Medication reconciliation information is now added to the patient's AVS automatically when it is printed.  There is no need to use this SmartLink in discharge instructions.  Highlight this text and delete it to clear this message}      General drug facts     If you have a very bad allergy, wear an allergy ID at all times.   It is important that you take the medication exactly as they are prescribed.   Keep your medication in the bottles provided by the pharmacist.  Keep a list of all your drugs (prescription, natural products, vitamins, OTC) with you. Give this list to your doctor.  Do not take other medications without consulting your doctor.    Do not share your drugs with others and do not take anyone else's drugs.   Keep all drugs out of the reach of children and pets.    Most drugs may be thrown away in household trash after mixing with coffee grounds or trinity litter and sealing in a plastic bag.    Keep a list Call your doctor for help with any side effects. If in the [place] you may also call the FDA at 8-679-GTY-6254    Talk with the doctor before starting any new drug, including OTC, natural products, or vitamins.        What to do at Home    1. Recommended diet: Low salt and Diabetic     2. Recommended activity: activity as tolerated    3. If you experience any of the following symptoms then please call your primary care physician or

## 2024-09-04 NOTE — PLAN OF CARE
Problem: Occupational Therapy - Adult  Goal: By Discharge: Performs self-care activities at highest level of function for planned discharge setting.  See evaluation for individualized goals.  Description: FUNCTIONAL STATUS PRIOR TO ADMISSION:  Patient was not ambulated and currently NWB of L LE with walking boot.   Receives Help From: Family, Home health, ADL Assistance: Needs assistance,  ,  ,  ,  ,  , Homemaking Assistance: Needs assistance, Ambulation Assistance: Non-ambulatory (NWB LLE - gait training will begin once WBAT.), Transfer Assistance: Needs assistance, Active : No     HOME SUPPORT: Patient lived  whom assists with functional ADL and transfers.    Occupational Therapy Goals:  Initiated 8/31/2024  1.  Patient will perform grooming with Set-up and Minimal Assist within 7 day(s).  2.  Patient will perform upper body dressing with Set-up and Minimal Assist within 7 day(s).  3.  Patient will perform toilet transfers with Minimal Assist and Assist x2  within 7 day(s).  4.  Patient will perform all aspects of toileting with Minimal Assist within 7 day(s).  5.  Patient will participate in upper extremity therapeutic exercise/activities with Set-up and Minimal Assist for 10 minutes within 7 day(s).    6.  Patient will utilize energy conservation techniques during functional activities with verbal cues within 7 day(s).   8/31/2024 1648 by Tanya King OT  Outcome: Not Progressing     Problem: Occupational Therapy - Adult  Goal: By Discharge: Performs self-care activities at highest level of function for planned discharge setting.  See evaluation for individualized goals.  Description: FUNCTIONAL STATUS PRIOR TO ADMISSION:  Patient was not ambulated and currently NWB of L LE with walking boot.   Receives Help From: Family, Home health, ADL Assistance: Needs assistance,  ,  ,  ,  ,  , Homemaking Assistance: Needs assistance, Ambulation Assistance: Non-ambulatory (NWB LLE - gait training will 
  Problem: Occupational Therapy - Adult  Goal: By Discharge: Performs self-care activities at highest level of function for planned discharge setting.  See evaluation for individualized goals.  Description: FUNCTIONAL STATUS PRIOR TO ADMISSION:  Patient was not ambulated and currently NWB of L LE with walking boot.   Receives Help From: Family, Home health, ADL Assistance: Needs assistance,  ,  ,  ,  ,  , Homemaking Assistance: Needs assistance, Ambulation Assistance: Non-ambulatory (NWB LLE - gait training will begin once WBAT.), Transfer Assistance: Needs assistance, Active : No     HOME SUPPORT: Patient lived  whom assists with functional ADL and transfers.    Occupational Therapy Goals:  Initiated 8/31/2024  1.  Patient will perform grooming with Set-up and Minimal Assist within 7 day(s).  2.  Patient will perform upper body dressing with Set-up and Minimal Assist within 7 day(s).  3.  Patient will perform toilet transfers with Minimal Assist and Assist x2  within 7 day(s).  4.  Patient will perform all aspects of toileting with Minimal Assist within 7 day(s).  5.  Patient will participate in upper extremity therapeutic exercise/activities with Set-up and Minimal Assist for 10 minutes within 7 day(s).    6.  Patient will utilize energy conservation techniques during functional activities with verbal cues within 7 day(s).   8/31/2024 1648 by Tanya King OT  Outcome: Not Progressing     Problem: Physical Therapy - Adult  Goal: By Discharge: Performs mobility at highest level of function for planned discharge setting.  See evaluation for individualized goals.  Description: FUNCTIONAL STATUS PRIOR TO ADMISSION: The patient  required moderate assistance for basic and instrumental ADLs. and The patient was functional at the wheelchair level and required moderate assistancefor transfers to the chair.    HOME SUPPORT PRIOR TO ADMISSION: The patient lived with spouse and required moderate assistance for 
  Problem: Occupational Therapy - Adult  Goal: By Discharge: Performs self-care activities at highest level of function for planned discharge setting.  See evaluation for individualized goals.  Description: FUNCTIONAL STATUS PRIOR TO ADMISSION:  Patient was not ambulated and currently NWB of L LE with walking boot.   Receives Help From: Family, Home health, ADL Assistance: Needs assistance,  ,  ,  ,  ,  , Homemaking Assistance: Needs assistance, Ambulation Assistance: Non-ambulatory (NWB LLE - gait training will begin once WBAT.), Transfer Assistance: Needs assistance, Active : No     HOME SUPPORT: Patient lived  whom assists with functional ADL and transfers.    Occupational Therapy Goals:  Initiated 8/31/2024  1.  Patient will perform grooming with Set-up and Minimal Assist within 7 day(s).  2.  Patient will perform upper body dressing with Set-up and Minimal Assist within 7 day(s).  3.  Patient will perform toilet transfers with Minimal Assist and Assist x2  within 7 day(s).  4.  Patient will perform all aspects of toileting with Minimal Assist within 7 day(s).  5.  Patient will participate in upper extremity therapeutic exercise/activities with Set-up and Minimal Assist for 10 minutes within 7 day(s).    6.  Patient will utilize energy conservation techniques during functional activities with verbal cues within 7 day(s).   Outcome: Not Progressing   OCCUPATIONAL THERAPY EVALUATION    Patient: Marilee Oakes (73 y.o. female)  Date: 8/31/2024  Primary Diagnosis: Acute respiratory distress [R06.03]  Acute pulmonary edema (HCC) [J81.0]  Cellulitis of left lower extremity [L03.116]  Acute heart failure, unspecified heart failure type (HCC) [I50.9]         Precautions: Weight Bearing, Fall Risk, Up as Tolerated   Left Lower Extremity Weight Bearing: Non Weight Bearing              ASSESSMENT :  The patient is limited by decreased functional mobility, independence in ADLs, ROM, strength, activity 
  Problem: Respiratory - Adult  Goal: Achieves optimal ventilation and oxygenation  9/2/2024 1930 by Jo Ann Oakes RCP  Outcome: Progressing  9/2/2024 1458 by Jyotsna Sanon, RN  Outcome: Progressing  9/2/2024 0831 by Vera Reid RCP  Outcome: Progressing  Flowsheets (Taken 9/2/2024 0757 by Jyotsna Sanon, RN)  Achieves optimal ventilation and oxygenation: Assess for changes in respiratory status     
  Problem: Safety - Adult  Goal: Free from fall injury  Outcome: Progressing  Flowsheets (Taken 9/3/2024 8080)  Free From Fall Injury:   Instruct family/caregiver on patient safety   Based on caregiver fall risk screen, instruct family/caregiver to ask for assistance with transferring infant if caregiver noted to have fall risk factors     
  Problem: Skin/Tissue Integrity  Goal: Absence of new skin breakdown  Description: 1.  Monitor for areas of redness and/or skin breakdown  2.  Assess vascular access sites hourly  3.  Every 4-6 hours minimum:  Change oxygen saturation probe site  4.  Every 4-6 hours:  If on nasal continuous positive airway pressure, respiratory therapy assess nares and determine need for appliance change or resting period.  8/29/2024 2355 by Mabel Perez RN  Outcome: Progressing  8/29/2024 2014 by Dinora Troy RN  Outcome: Progressing     Problem: Pain  Goal: Verbalizes/displays adequate comfort level or baseline comfort level  8/29/2024 2355 by Mabel Perez RN  Outcome: Progressing  8/29/2024 2014 by Dinora Troy RN  Outcome: Progressing     Problem: Discharge Planning  Goal: Discharge to home or other facility with appropriate resources  8/29/2024 2355 by Mabel Perez RN  Outcome: Progressing  8/29/2024 2014 by Dinora Troy RN  Outcome: Progressing     Problem: Safety - Adult  Goal: Free from fall injury  8/29/2024 2355 by Mabel Perez RN  Outcome: Progressing  8/29/2024 2014 by Dinora Troy RN  Outcome: Progressing     Problem: Chronic Conditions and Co-morbidities  Goal: Patient's chronic conditions and co-morbidity symptoms are monitored and maintained or improved  8/29/2024 2355 by Mabel Perez RN  Outcome: Progressing  8/29/2024 2014 by Dinora Troy RN  Outcome: Progressing     Problem: Respiratory - Adult  Goal: Achieves optimal ventilation and oxygenation  8/29/2024 2355 by Mabel Perez RN  Outcome: Progressing  8/29/2024 2156 by Luna Howard RCP  Outcome: Progressing  8/29/2024 2014 by Dinora Troy RN  Outcome: Progressing     
  Problem: Skin/Tissue Integrity  Goal: Absence of new skin breakdown  Description: 1.  Monitor for areas of redness and/or skin breakdown  2.  Assess vascular access sites hourly  3.  Every 4-6 hours minimum:  Change oxygen saturation probe site  4.  Every 4-6 hours:  If on nasal continuous positive airway pressure, respiratory therapy assess nares and determine need for appliance change or resting period.  8/30/2024 1910 by Xavier Nolasco RN  Outcome: Progressing  8/30/2024 1820 by Dinora Troy RN  Outcome: Progressing     Problem: Pain  Goal: Verbalizes/displays adequate comfort level or baseline comfort level  8/30/2024 1910 by Xavier Nolasco RN  Outcome: Progressing  8/30/2024 1820 by Dinora Troy RN  Outcome: Progressing     Problem: Discharge Planning  Goal: Discharge to home or other facility with appropriate resources  8/30/2024 1910 by Xavier Nolasco RN  Outcome: Progressing  8/30/2024 1820 by Dinora Troy RN  Outcome: Progressing     Problem: Safety - Adult  Goal: Free from fall injury  8/30/2024 1910 by Xavier Nolasco RN  Outcome: Progressing  8/30/2024 1820 by Dinora Troy RN  Outcome: Progressing     Problem: Chronic Conditions and Co-morbidities  Goal: Patient's chronic conditions and co-morbidity symptoms are monitored and maintained or improved  8/30/2024 1910 by Xavier Nolasco RN  Outcome: Progressing  8/30/2024 1820 by Dinora Troy RN  Outcome: Progressing     Problem: Respiratory - Adult  Goal: Achieves optimal ventilation and oxygenation  8/30/2024 1910 by Xavier Nolasco RN  Outcome: Progressing  8/30/2024 1820 by Dinora Troy RN  Outcome: Progressing  8/30/2024 0841 by Vera Reid RCP  Outcome: Progressing     
  Problem: Skin/Tissue Integrity  Goal: Absence of new skin breakdown  Description: 1.  Monitor for areas of redness and/or skin breakdown  2.  Assess vascular access sites hourly  3.  Every 4-6 hours minimum:  Change oxygen saturation probe site  4.  Every 4-6 hours:  If on nasal continuous positive airway pressure, respiratory therapy assess nares and determine need for appliance change or resting period.  9/4/2024 1046 by Acacia Gibbons, RN  Outcome: Adequate for Discharge  9/4/2024 0920 by Acacia Gibbons, RN  Outcome: Progressing     Problem: Pain  Goal: Verbalizes/displays adequate comfort level or baseline comfort level  Outcome: Adequate for Discharge  Flowsheets (Taken 9/4/2024 0815)  Verbalizes/displays adequate comfort level or baseline comfort level: Encourage patient to monitor pain and request assistance     Problem: Discharge Planning  Goal: Discharge to home or other facility with appropriate resources  Outcome: Adequate for Discharge  Flowsheets (Taken 9/4/2024 0719)  Discharge to home or other facility with appropriate resources: Identify barriers to discharge with patient and caregiver     Problem: Safety - Adult  Goal: Free from fall injury  9/4/2024 1046 by Acacia Gibbons, RN  Outcome: Adequate for Discharge  Flowsheets (Taken 9/4/2024 0719)  Free From Fall Injury: Instruct family/caregiver on patient safety  9/3/2024 2349 by Noelle Miller RN  Outcome: Progressing  Flowsheets (Taken 9/3/2024 2349)  Free From Fall Injury:   Instruct family/caregiver on patient safety   Based on caregiver fall risk screen, instruct family/caregiver to ask for assistance with transferring infant if caregiver noted to have fall risk factors     Problem: Chronic Conditions and Co-morbidities  Goal: Patient's chronic conditions and co-morbidity symptoms are monitored and maintained or improved  Outcome: Adequate for Discharge  Flowsheets (Taken 9/4/2024 0719)  Care Plan - Patient's Chronic 
  Problem: Skin/Tissue Integrity  Goal: Absence of new skin breakdown  Description: 1.  Monitor for areas of redness and/or skin breakdown  2.  Assess vascular access sites hourly  3.  Every 4-6 hours minimum:  Change oxygen saturation probe site  4.  Every 4-6 hours:  If on nasal continuous positive airway pressure, respiratory therapy assess nares and determine need for appliance change or resting period.  Outcome: Progressing     Problem: Pain  Goal: Verbalizes/displays adequate comfort level or baseline comfort level  Outcome: Progressing     Problem: Discharge Planning  Goal: Discharge to home or other facility with appropriate resources  Outcome: Progressing     Problem: Safety - Adult  Goal: Free from fall injury  Outcome: Progressing     Problem: Chronic Conditions and Co-morbidities  Goal: Patient's chronic conditions and co-morbidity symptoms are monitored and maintained or improved  Outcome: Progressing     Problem: Respiratory - Adult  Goal: Achieves optimal ventilation and oxygenation  8/30/2024 1820 by Dinora Troy, RN  Outcome: Progressing  8/30/2024 0841 by Vera Reid RCP  Outcome: Progressing     
  Problem: Skin/Tissue Integrity  Goal: Absence of new skin breakdown  Description: 1.  Monitor for areas of redness and/or skin breakdown  2.  Assess vascular access sites hourly  3.  Every 4-6 hours minimum:  Change oxygen saturation probe site  4.  Every 4-6 hours:  If on nasal continuous positive airway pressure, respiratory therapy assess nares and determine need for appliance change or resting period.  Outcome: Progressing     Problem: Pain  Goal: Verbalizes/displays adequate comfort level or baseline comfort level  Outcome: Progressing     Problem: Discharge Planning  Goal: Discharge to home or other facility with appropriate resources  Outcome: Progressing     Problem: Safety - Adult  Goal: Free from fall injury  Outcome: Progressing     Problem: Chronic Conditions and Co-morbidities  Goal: Patient's chronic conditions and co-morbidity symptoms are monitored and maintained or improved  Outcome: Progressing     Problem: Respiratory - Adult  Goal: Achieves optimal ventilation and oxygenation  Outcome: Progressing     
  Problem: Skin/Tissue Integrity  Goal: Absence of new skin breakdown  Description: 1.  Monitor for areas of redness and/or skin breakdown  2.  Assess vascular access sites hourly  3.  Every 4-6 hours minimum:  Change oxygen saturation probe site  4.  Every 4-6 hours:  If on nasal continuous positive airway pressure, respiratory therapy assess nares and determine need for appliance change or resting period.  Outcome: Progressing     Problem: Pain  Goal: Verbalizes/displays adequate comfort level or baseline comfort level  Outcome: Progressing  Flowsheets (Taken 9/2/2024 0757)  Verbalizes/displays adequate comfort level or baseline comfort level: Encourage patient to monitor pain and request assistance     Problem: Discharge Planning  Goal: Discharge to home or other facility with appropriate resources  Outcome: Progressing  Flowsheets (Taken 9/2/2024 0757)  Discharge to home or other facility with appropriate resources: Identify barriers to discharge with patient and caregiver     Problem: Safety - Adult  Goal: Free from fall injury  Outcome: Progressing     Problem: Chronic Conditions and Co-morbidities  Goal: Patient's chronic conditions and co-morbidity symptoms are monitored and maintained or improved  Outcome: Progressing  Flowsheets (Taken 9/2/2024 0757)  Care Plan - Patient's Chronic Conditions and Co-Morbidity Symptoms are Monitored and Maintained or Improved: Monitor and assess patient's chronic conditions and comorbid symptoms for stability, deterioration, or improvement     Problem: Respiratory - Adult  Goal: Achieves optimal ventilation and oxygenation  9/2/2024 1458 by Jyotsna Sanon, RN  Outcome: Progressing  9/2/2024 0831 by Vera Reid RCP  Outcome: Progressing  Flowsheets (Taken 9/2/2024 0757 by Jyotsna Sanon, RN)  Achieves optimal ventilation and oxygenation: Assess for changes in respiratory status     
  Problem: Skin/Tissue Integrity  Goal: Absence of new skin breakdown  Description: 1.  Monitor for areas of redness and/or skin breakdown  2.  Assess vascular access sites hourly  3.  Every 4-6 hours minimum:  Change oxygen saturation probe site  4.  Every 4-6 hours:  If on nasal continuous positive airway pressure, respiratory therapy assess nares and determine need for appliance change or resting period.  Outcome: Progressing     Problem: Safety - Adult  Goal: Free from fall injury  Recent Flowsheet Documentation  Taken 9/4/2024 0719 by Acacia Gibbons, RN  Free From Fall Injury: Instruct family/caregiver on patient safety  9/3/2024 2349 by Noelle Miller, RN  Outcome: Progressing  Flowsheets (Taken 9/3/2024 2349)  Free From Fall Injury:   Instruct family/caregiver on patient safety   Based on caregiver fall risk screen, instruct family/caregiver to ask for assistance with transferring infant if caregiver noted to have fall risk factors     
Problem: Physical Therapy - Adult  Goal: By Discharge: Performs mobility at highest level of function for planned discharge setting.  See evaluation for individualized goals.  Description: FUNCTIONAL STATUS PRIOR TO ADMISSION: The patient  required moderate assistance for basic and instrumental ADLs. and The patient was functional at the wheelchair level and required moderate assistancefor transfers to the chair.    HOME SUPPORT PRIOR TO ADMISSION: The patient lived with spouse and required moderate assistance for ADL's. 1 story home with a ramp.    Physical Therapy Goals  Initiated 8/31/2024  1.  Patient will move from supine to sit and sit to supine, scoot up and down, and roll side to side in bed with modified independence within 7 day(s).    2.  Patient will perform sit to stand with minimal assistance within 7 day(s).  3.  Patient will transfer from bed to chair and chair to bed with moderate assistance using the least restrictive device within 7 day(s).    Outcome: Not Progressing   PHYSICAL THERAPY EVALUATION    Patient: Marilee Oakes (73 y.o. female)  Date: 8/31/2024  Primary Diagnosis: Acute respiratory distress [R06.03]  Acute pulmonary edema (HCC) [J81.0]  Cellulitis of left lower extremity [L03.116]  Acute heart failure, unspecified heart failure type (HCC) [I50.9]       Precautions: Restrictions/Precautions: Weight Bearing, Fall Risk, Up as Tolerated  Required Braces or Orthoses?: Yes Required Braces or Orthoses?: Yes Lower Extremity Weight Bearing Restrictions  Left Lower Extremity Weight Bearing: Non Weight Bearing           Required Braces or Orthoses  Left Lower Extremity Brace: Boot  LLE Brace Type: Cam boot      ASSESSMENT :   DEFICITS/IMPAIRMENTS:   The patient is limited by decreased functional mobility, independence in ADLs, high-level IADLs, ROM, strength, sensation, activity tolerance, endurance, safety awareness, cognition, command following, attention/concentration, coordination, balance 
Natalia Skaggs, RT  Outcome: Progressing  8/30/2024 1910 by Xavier Nolasco, RN  Outcome: Progressing  8/30/2024 1820 by Dinora Troy, RN  Outcome: Progressing

## 2024-09-19 ENCOUNTER — OFFICE VISIT (OUTPATIENT)
Age: 74
End: 2024-09-19
Payer: MEDICARE

## 2024-09-19 VITALS
HEIGHT: 67 IN | HEART RATE: 85 BPM | RESPIRATION RATE: 18 BRPM | TEMPERATURE: 97.6 F | DIASTOLIC BLOOD PRESSURE: 80 MMHG | OXYGEN SATURATION: 97 % | BODY MASS INDEX: 32.11 KG/M2 | SYSTOLIC BLOOD PRESSURE: 119 MMHG

## 2024-09-19 DIAGNOSIS — J41.0 SIMPLE CHRONIC BRONCHITIS (HCC): ICD-10-CM

## 2024-09-19 DIAGNOSIS — K58.9 IRRITABLE BOWEL SYNDROME, UNSPECIFIED TYPE: ICD-10-CM

## 2024-09-19 DIAGNOSIS — N31.9 NEUROGENIC BLADDER DISORDER: ICD-10-CM

## 2024-09-19 DIAGNOSIS — Z91.81 AT HIGH RISK FOR FALLS: ICD-10-CM

## 2024-09-19 DIAGNOSIS — I25.118 CORONARY ARTERY DISEASE OF NATIVE HEART WITH STABLE ANGINA PECTORIS, UNSPECIFIED VESSEL OR LESION TYPE (HCC): ICD-10-CM

## 2024-09-19 DIAGNOSIS — E11.42 DIABETIC POLYNEUROPATHY ASSOCIATED WITH TYPE 2 DIABETES MELLITUS (HCC): Primary | ICD-10-CM

## 2024-09-19 LAB — HBA1C MFR BLD: 5.8 %

## 2024-09-19 PROCEDURE — 83036 HEMOGLOBIN GLYCOSYLATED A1C: CPT | Performed by: STUDENT IN AN ORGANIZED HEALTH CARE EDUCATION/TRAINING PROGRAM

## 2024-09-19 PROCEDURE — 91320 SARSCV2 VAC 30MCG TRS-SUC IM: CPT | Performed by: STUDENT IN AN ORGANIZED HEALTH CARE EDUCATION/TRAINING PROGRAM

## 2024-09-19 PROCEDURE — 90653 IIV ADJUVANT VACCINE IM: CPT | Performed by: STUDENT IN AN ORGANIZED HEALTH CARE EDUCATION/TRAINING PROGRAM

## 2024-09-19 PROCEDURE — 99204 OFFICE O/P NEW MOD 45 MIN: CPT | Performed by: STUDENT IN AN ORGANIZED HEALTH CARE EDUCATION/TRAINING PROGRAM

## 2024-09-19 RX ORDER — ALBUTEROL SULFATE 90 UG/1
1 INHALANT RESPIRATORY (INHALATION) EVERY 4 HOURS PRN
Qty: 18 G | Refills: 3
Start: 2024-09-19 | End: 2025-09-14

## 2024-09-19 RX ORDER — PREGABALIN 75 MG/1
75 CAPSULE ORAL DAILY
Qty: 90 CAPSULE | Refills: 3 | Status: SHIPPED | OUTPATIENT
Start: 2024-09-19 | End: 2025-09-14

## 2024-09-19 RX ORDER — FLUTICASONE FUROATE, UMECLIDINIUM BROMIDE AND VILANTEROL TRIFENATATE 100; 62.5; 25 UG/1; UG/1; UG/1
1 POWDER RESPIRATORY (INHALATION) DAILY
Qty: 60 EACH | Refills: 3 | Status: SHIPPED | OUTPATIENT
Start: 2024-09-19

## 2024-09-19 RX ORDER — ISOSORBIDE MONONITRATE 60 MG/1
60 TABLET, EXTENDED RELEASE ORAL DAILY
Qty: 90 TABLET | Refills: 3 | Status: SHIPPED | OUTPATIENT
Start: 2024-09-19 | End: 2025-09-14

## 2024-09-19 RX ORDER — BUDESONIDE AND FORMOTEROL FUMARATE DIHYDRATE 160; 4.5 UG/1; UG/1
2 AEROSOL RESPIRATORY (INHALATION) 2 TIMES DAILY
Qty: 30.6 G | Refills: 1 | Status: CANCELLED | OUTPATIENT
Start: 2024-09-19

## 2024-09-19 RX ORDER — CARVEDILOL 25 MG/1
25 TABLET ORAL 2 TIMES DAILY WITH MEALS
Qty: 180 TABLET | Refills: 3 | Status: SHIPPED | OUTPATIENT
Start: 2024-09-19 | End: 2025-09-14

## 2024-09-19 RX ORDER — OMEPRAZOLE 40 MG/1
40 CAPSULE, DELAYED RELEASE ORAL DAILY
Qty: 90 CAPSULE | Refills: 3 | Status: SHIPPED | OUTPATIENT
Start: 2024-09-19 | End: 2025-09-14

## 2024-09-19 ASSESSMENT — PATIENT HEALTH QUESTIONNAIRE - PHQ9
SUM OF ALL RESPONSES TO PHQ QUESTIONS 1-9: 1
SUM OF ALL RESPONSES TO PHQ QUESTIONS 1-9: 1
6. FEELING BAD ABOUT YOURSELF - OR THAT YOU ARE A FAILURE OR HAVE LET YOURSELF OR YOUR FAMILY DOWN: NOT AT ALL
10. IF YOU CHECKED OFF ANY PROBLEMS, HOW DIFFICULT HAVE THESE PROBLEMS MADE IT FOR YOU TO DO YOUR WORK, TAKE CARE OF THINGS AT HOME, OR GET ALONG WITH OTHER PEOPLE: NOT DIFFICULT AT ALL
7. TROUBLE CONCENTRATING ON THINGS, SUCH AS READING THE NEWSPAPER OR WATCHING TELEVISION: NOT AT ALL
SUM OF ALL RESPONSES TO PHQ QUESTIONS 1-9: 0
9. THOUGHTS THAT YOU WOULD BE BETTER OFF DEAD, OR OF HURTING YOURSELF: NOT AT ALL
1. LITTLE INTEREST OR PLEASURE IN DOING THINGS: NOT AT ALL
SUM OF ALL RESPONSES TO PHQ QUESTIONS 1-9: 1
SUM OF ALL RESPONSES TO PHQ QUESTIONS 1-9: 1
SUM OF ALL RESPONSES TO PHQ QUESTIONS 1-9: 0
8. MOVING OR SPEAKING SO SLOWLY THAT OTHER PEOPLE COULD HAVE NOTICED. OR THE OPPOSITE, BEING SO FIGETY OR RESTLESS THAT YOU HAVE BEEN MOVING AROUND A LOT MORE THAN USUAL: NOT AT ALL
3. TROUBLE FALLING OR STAYING ASLEEP: NOT AT ALL
5. POOR APPETITE OR OVEREATING: NOT AT ALL
2. FEELING DOWN, DEPRESSED OR HOPELESS: NOT AT ALL
SUM OF ALL RESPONSES TO PHQ QUESTIONS 1-9: 0
SUM OF ALL RESPONSES TO PHQ QUESTIONS 1-9: 0
4. FEELING TIRED OR HAVING LITTLE ENERGY: SEVERAL DAYS
SUM OF ALL RESPONSES TO PHQ9 QUESTIONS 1 & 2: 0

## 2024-10-21 ENCOUNTER — HOSPITAL ENCOUNTER (INPATIENT)
Facility: HOSPITAL | Age: 74
LOS: 9 days | Discharge: HOME HEALTH | DRG: 857 | End: 2024-10-30
Attending: STUDENT IN AN ORGANIZED HEALTH CARE EDUCATION/TRAINING PROGRAM | Admitting: STUDENT IN AN ORGANIZED HEALTH CARE EDUCATION/TRAINING PROGRAM
Payer: MEDICARE

## 2024-10-21 ENCOUNTER — APPOINTMENT (OUTPATIENT)
Facility: HOSPITAL | Age: 74
DRG: 857 | End: 2024-10-21
Payer: MEDICARE

## 2024-10-21 DIAGNOSIS — M86.062 ACUTE HEMATOGENOUS OSTEOMYELITIS OF LEFT TIBIA: ICD-10-CM

## 2024-10-21 DIAGNOSIS — R50.9 FEVER, UNSPECIFIED FEVER CAUSE: ICD-10-CM

## 2024-10-21 DIAGNOSIS — L03.116 CELLULITIS OF LEFT LOWER EXTREMITY: Primary | ICD-10-CM

## 2024-10-21 LAB
ALBUMIN SERPL-MCNC: 2.8 G/DL (ref 3.5–5)
ALBUMIN/GLOB SERPL: 0.9 (ref 1.1–2.2)
ALP SERPL-CCNC: 166 U/L (ref 45–117)
ALT SERPL-CCNC: 10 U/L (ref 12–78)
ANION GAP SERPL CALC-SCNC: 5 MMOL/L (ref 2–12)
AST SERPL-CCNC: 14 U/L (ref 15–37)
BASOPHILS # BLD: 0 K/UL (ref 0–0.1)
BASOPHILS NFR BLD: 0 % (ref 0–1)
BILIRUB SERPL-MCNC: 0.5 MG/DL (ref 0.2–1)
BUN SERPL-MCNC: 24 MG/DL (ref 6–20)
BUN/CREAT SERPL: 14 (ref 12–20)
CALCIUM SERPL-MCNC: 8.9 MG/DL (ref 8.5–10.1)
CHLORIDE SERPL-SCNC: 107 MMOL/L (ref 97–108)
CO2 SERPL-SCNC: 27 MMOL/L (ref 21–32)
CREAT SERPL-MCNC: 1.7 MG/DL (ref 0.55–1.02)
DIFFERENTIAL METHOD BLD: ABNORMAL
EOSINOPHIL # BLD: 0.1 K/UL (ref 0–0.4)
EOSINOPHIL NFR BLD: 1 % (ref 0–7)
ERYTHROCYTE [DISTWIDTH] IN BLOOD BY AUTOMATED COUNT: 16.4 % (ref 11.5–14.5)
FLUAV RNA SPEC QL NAA+PROBE: NOT DETECTED
FLUBV RNA SPEC QL NAA+PROBE: NOT DETECTED
GLOBULIN SER CALC-MCNC: 3.2 G/DL (ref 2–4)
GLUCOSE SERPL-MCNC: 219 MG/DL (ref 65–100)
HCT VFR BLD AUTO: 32.3 % (ref 35–47)
HGB BLD-MCNC: 10.3 G/DL (ref 11.5–16)
IMM GRANULOCYTES # BLD AUTO: 0 K/UL (ref 0–0.04)
IMM GRANULOCYTES NFR BLD AUTO: 0 % (ref 0–0.5)
LACTATE BLD-SCNC: 1.84 MMOL/L (ref 0.4–2)
LYMPHOCYTES # BLD: 0.7 K/UL (ref 0.8–3.5)
LYMPHOCYTES NFR BLD: 7 % (ref 12–49)
MCH RBC QN AUTO: 27 PG (ref 26–34)
MCHC RBC AUTO-ENTMCNC: 31.9 G/DL (ref 30–36.5)
MCV RBC AUTO: 84.6 FL (ref 80–99)
MONOCYTES # BLD: 0.8 K/UL (ref 0–1)
MONOCYTES NFR BLD: 8 % (ref 5–13)
NEUTS SEG # BLD: 8.3 K/UL (ref 1.8–8)
NEUTS SEG NFR BLD: 84 % (ref 32–75)
NRBC # BLD: 0 K/UL (ref 0–0.01)
NRBC BLD-RTO: 0 PER 100 WBC
PLATELET # BLD AUTO: 277 K/UL (ref 150–400)
PMV BLD AUTO: 9.9 FL (ref 8.9–12.9)
POTASSIUM SERPL-SCNC: 4 MMOL/L (ref 3.5–5.1)
PROCALCITONIN SERPL-MCNC: <0.05 NG/ML
PROT SERPL-MCNC: 6 G/DL (ref 6.4–8.2)
RBC # BLD AUTO: 3.82 M/UL (ref 3.8–5.2)
RBC MORPH BLD: ABNORMAL
SARS-COV-2 RNA RESP QL NAA+PROBE: NOT DETECTED
SODIUM SERPL-SCNC: 139 MMOL/L (ref 136–145)
SOURCE: NORMAL
WBC # BLD AUTO: 9.9 K/UL (ref 3.6–11)

## 2024-10-21 PROCEDURE — 73610 X-RAY EXAM OF ANKLE: CPT

## 2024-10-21 PROCEDURE — 6360000002 HC RX W HCPCS

## 2024-10-21 PROCEDURE — 83605 ASSAY OF LACTIC ACID: CPT

## 2024-10-21 PROCEDURE — 99285 EMERGENCY DEPT VISIT HI MDM: CPT

## 2024-10-21 PROCEDURE — 1100000000 HC RM PRIVATE

## 2024-10-21 PROCEDURE — 87040 BLOOD CULTURE FOR BACTERIA: CPT

## 2024-10-21 PROCEDURE — 73600 X-RAY EXAM OF ANKLE: CPT

## 2024-10-21 PROCEDURE — 84145 PROCALCITONIN (PCT): CPT

## 2024-10-21 PROCEDURE — 36415 COLL VENOUS BLD VENIPUNCTURE: CPT

## 2024-10-21 PROCEDURE — 96375 TX/PRO/DX INJ NEW DRUG ADDON: CPT

## 2024-10-21 PROCEDURE — 73630 X-RAY EXAM OF FOOT: CPT

## 2024-10-21 PROCEDURE — 2580000003 HC RX 258

## 2024-10-21 PROCEDURE — 85025 COMPLETE CBC W/AUTO DIFF WBC: CPT

## 2024-10-21 PROCEDURE — 96374 THER/PROPH/DIAG INJ IV PUSH: CPT

## 2024-10-21 PROCEDURE — 73620 X-RAY EXAM OF FOOT: CPT

## 2024-10-21 PROCEDURE — 1100000003 HC PRIVATE W/ TELEMETRY

## 2024-10-21 PROCEDURE — 6370000000 HC RX 637 (ALT 250 FOR IP)

## 2024-10-21 PROCEDURE — 87636 SARSCOV2 & INF A&B AMP PRB: CPT

## 2024-10-21 PROCEDURE — 93005 ELECTROCARDIOGRAM TRACING: CPT

## 2024-10-21 PROCEDURE — 80053 COMPREHEN METABOLIC PANEL: CPT

## 2024-10-21 PROCEDURE — 71045 X-RAY EXAM CHEST 1 VIEW: CPT

## 2024-10-21 RX ORDER — ISOSORBIDE MONONITRATE 30 MG/1
60 TABLET, EXTENDED RELEASE ORAL DAILY
Status: DISCONTINUED | OUTPATIENT
Start: 2024-10-22 | End: 2024-10-30 | Stop reason: HOSPADM

## 2024-10-21 RX ORDER — PREGABALIN 75 MG/1
75 CAPSULE ORAL DAILY
Status: DISCONTINUED | OUTPATIENT
Start: 2024-10-22 | End: 2024-10-30 | Stop reason: HOSPADM

## 2024-10-21 RX ORDER — LANOLIN ALCOHOL/MO/W.PET/CERES
3 CREAM (GRAM) TOPICAL NIGHTLY
Status: DISCONTINUED | OUTPATIENT
Start: 2024-10-21 | End: 2024-10-30 | Stop reason: HOSPADM

## 2024-10-21 RX ORDER — SODIUM CHLORIDE 0.9 % (FLUSH) 0.9 %
5-40 SYRINGE (ML) INJECTION PRN
Status: DISCONTINUED | OUTPATIENT
Start: 2024-10-21 | End: 2024-10-30 | Stop reason: HOSPADM

## 2024-10-21 RX ORDER — SODIUM CHLORIDE 9 MG/ML
INJECTION, SOLUTION INTRAVENOUS PRN
Status: DISCONTINUED | OUTPATIENT
Start: 2024-10-21 | End: 2024-10-30 | Stop reason: HOSPADM

## 2024-10-21 RX ORDER — ASPIRIN 81 MG/1
81 TABLET, CHEWABLE ORAL DAILY
Status: DISCONTINUED | OUTPATIENT
Start: 2024-10-22 | End: 2024-10-23

## 2024-10-21 RX ORDER — SODIUM CHLORIDE 0.9 % (FLUSH) 0.9 %
5-40 SYRINGE (ML) INJECTION EVERY 12 HOURS SCHEDULED
Status: DISCONTINUED | OUTPATIENT
Start: 2024-10-21 | End: 2024-10-30 | Stop reason: HOSPADM

## 2024-10-21 RX ORDER — ALBUTEROL SULFATE 0.83 MG/ML
2.5 SOLUTION RESPIRATORY (INHALATION) EVERY 4 HOURS PRN
Status: DISCONTINUED | OUTPATIENT
Start: 2024-10-21 | End: 2024-10-30 | Stop reason: HOSPADM

## 2024-10-21 RX ORDER — CARVEDILOL 12.5 MG/1
25 TABLET ORAL 2 TIMES DAILY WITH MEALS
Status: DISCONTINUED | OUTPATIENT
Start: 2024-10-22 | End: 2024-10-30 | Stop reason: HOSPADM

## 2024-10-21 RX ORDER — BUMETANIDE 1 MG/1
2 TABLET ORAL 2 TIMES DAILY
Status: DISCONTINUED | OUTPATIENT
Start: 2024-10-22 | End: 2024-10-26

## 2024-10-21 RX ORDER — ENOXAPARIN SODIUM 100 MG/ML
30 INJECTION SUBCUTANEOUS 2 TIMES DAILY
Status: DISCONTINUED | OUTPATIENT
Start: 2024-10-21 | End: 2024-10-23

## 2024-10-21 RX ORDER — POLYETHYLENE GLYCOL 3350 17 G/17G
17 POWDER, FOR SOLUTION ORAL DAILY PRN
Status: DISCONTINUED | OUTPATIENT
Start: 2024-10-21 | End: 2024-10-25

## 2024-10-21 RX ORDER — ACETAMINOPHEN 650 MG/1
650 SUPPOSITORY RECTAL EVERY 6 HOURS PRN
Status: DISCONTINUED | OUTPATIENT
Start: 2024-10-21 | End: 2024-10-30 | Stop reason: HOSPADM

## 2024-10-21 RX ORDER — 0.9 % SODIUM CHLORIDE 0.9 %
1000 INTRAVENOUS SOLUTION INTRAVENOUS ONCE
Status: COMPLETED | OUTPATIENT
Start: 2024-10-21 | End: 2024-10-21

## 2024-10-21 RX ORDER — FERROUS SULFATE 325(65) MG
325 TABLET ORAL
Status: DISCONTINUED | OUTPATIENT
Start: 2024-10-22 | End: 2024-10-26

## 2024-10-21 RX ORDER — PANTOPRAZOLE SODIUM 40 MG/1
40 TABLET, DELAYED RELEASE ORAL
Status: DISCONTINUED | OUTPATIENT
Start: 2024-10-22 | End: 2024-10-30 | Stop reason: HOSPADM

## 2024-10-21 RX ORDER — ONDANSETRON 4 MG/1
4 TABLET, ORALLY DISINTEGRATING ORAL EVERY 8 HOURS PRN
Status: DISCONTINUED | OUTPATIENT
Start: 2024-10-21 | End: 2024-10-23 | Stop reason: SDUPTHER

## 2024-10-21 RX ORDER — ACETAMINOPHEN 500 MG
1000 TABLET ORAL
Status: COMPLETED | OUTPATIENT
Start: 2024-10-21 | End: 2024-10-21

## 2024-10-21 RX ORDER — ACETAMINOPHEN 325 MG/1
650 TABLET ORAL EVERY 6 HOURS PRN
Status: DISCONTINUED | OUTPATIENT
Start: 2024-10-21 | End: 2024-10-30 | Stop reason: HOSPADM

## 2024-10-21 RX ORDER — OXYCODONE HYDROCHLORIDE 5 MG/1
2.5 TABLET ORAL EVERY 4 HOURS PRN
Status: DISCONTINUED | OUTPATIENT
Start: 2024-10-21 | End: 2024-10-30 | Stop reason: HOSPADM

## 2024-10-21 RX ORDER — ONDANSETRON 2 MG/ML
4 INJECTION INTRAMUSCULAR; INTRAVENOUS EVERY 6 HOURS PRN
Status: DISCONTINUED | OUTPATIENT
Start: 2024-10-21 | End: 2024-10-23 | Stop reason: SDUPTHER

## 2024-10-21 RX ORDER — OXYCODONE HYDROCHLORIDE 5 MG/1
5 TABLET ORAL EVERY 4 HOURS PRN
Status: DISCONTINUED | OUTPATIENT
Start: 2024-10-21 | End: 2024-10-30 | Stop reason: HOSPADM

## 2024-10-21 RX ORDER — ALBUTEROL SULFATE 90 UG/1
1 INHALANT RESPIRATORY (INHALATION) EVERY 4 HOURS PRN
Status: DISCONTINUED | OUTPATIENT
Start: 2024-10-21 | End: 2024-10-21

## 2024-10-21 RX ADMIN — WATER 2000 MG: 1 INJECTION INTRAMUSCULAR; INTRAVENOUS; SUBCUTANEOUS at 21:42

## 2024-10-21 RX ADMIN — SODIUM CHLORIDE 1000 ML: 9 INJECTION, SOLUTION INTRAVENOUS at 21:45

## 2024-10-21 RX ADMIN — ACETAMINOPHEN 1000 MG: 500 TABLET ORAL at 18:48

## 2024-10-21 RX ADMIN — Medication 2500 MG: at 21:54

## 2024-10-21 ASSESSMENT — PAIN SCALES - GENERAL
PAINLEVEL_OUTOF10: 8
PAINLEVEL_OUTOF10: 9

## 2024-10-21 ASSESSMENT — PAIN DESCRIPTION - ORIENTATION
ORIENTATION: LEFT
ORIENTATION: LEFT

## 2024-10-21 ASSESSMENT — PAIN DESCRIPTION - DESCRIPTORS: DESCRIPTORS: ACHING

## 2024-10-21 ASSESSMENT — PAIN DESCRIPTION - PAIN TYPE: TYPE: ACUTE PAIN

## 2024-10-21 ASSESSMENT — PAIN DESCRIPTION - FREQUENCY: FREQUENCY: CONTINUOUS

## 2024-10-21 ASSESSMENT — PAIN DESCRIPTION - LOCATION
LOCATION: ANKLE
LOCATION: ANKLE

## 2024-10-21 ASSESSMENT — PAIN DESCRIPTION - ONSET: ONSET: ON-GOING

## 2024-10-21 NOTE — ED PROVIDER NOTES
[facility] EMERGENCY DEPT  EMERGENCY DEPARTMENT ENCOUNTER       Pt Name: Marilee Oakes  MRN: [id number]  Birthdate 1950  Date of evaluation: 10/21/2024  Provider: oNelle Ang PA-C   PCP: Darien Potter MD  Note Started: 5:33 PM EDT 10/21/24     CHIEF COMPLAINT       Chief Complaint   Patient presents with    Foot Pain     Pt reports left foot pain today, pt arrived to ed with warm red lower extremity, pt had a walking boot on with some ace wrap, pt had two tylenol prior to coming to ed         HISTORY OF PRESENT ILLNESS: 1 or more elements      History From: Patient  HPI Limitations: None     Marilee Oakes is a 74 y.o. female with past medical history A-fib on Eliquis, CAD, CHF, ESRD on dialysis,, DM, hypertension, COPD on 2 L at baseline, GERD, bimalleolar ankle fracture, who presents complaining of left foot pain x 1 day.  Patient reports that she was at physical therapy today, had worsening pain in her left foot/ankle, and also felt that her whole body was \"achy\".  She notes she does have a history of recurrent left ankle infections following a surgical procedure.  She reports that she is currently in physical therapy twice a week, also has wound care coming to her house twice a week.  She reports that she has been wearing an orthopedic boot as recommended by her orthopedist.  She states she is not currently on any oral antibiotics.  She denies any known sick contacts.  She denies any recent falls or traumatic injury. She denies any cough, chest pain, abdominal pain, dysuria, hematuria, urinary symptoms.  She denies any recent URI symptoms.  She denies any other complaints.     Nursing Notes were all reviewed and agreed with or any disagreements were addressed in the HPI.     REVIEW OF SYSTEMS      Review of Systems     Positives and Pertinent negatives as per HPI.    PAST HISTORY     Past Medical History:  Past Medical History:   Diagnosis Date    Acute and chronic respiratory failure with hypoxia

## 2024-10-21 NOTE — ED NOTES
Unable to obtain timely blood cultures and lactate due to difficulty obtaining blood and IV access. Discussion between RN and ED provider is that we will give IM antibiotics before blood cultures for best patient care.  Provider notified after three unsuccessful attempts

## 2024-10-22 LAB
ANION GAP SERPL CALC-SCNC: 4 MMOL/L (ref 2–12)
BASOPHILS # BLD: 0 K/UL (ref 0–0.1)
BASOPHILS NFR BLD: 0 % (ref 0–1)
BUN SERPL-MCNC: 22 MG/DL (ref 6–20)
BUN/CREAT SERPL: 15 (ref 12–20)
CALCIUM SERPL-MCNC: 8.8 MG/DL (ref 8.5–10.1)
CHLORIDE SERPL-SCNC: 108 MMOL/L (ref 97–108)
CO2 SERPL-SCNC: 26 MMOL/L (ref 21–32)
CREAT SERPL-MCNC: 1.51 MG/DL (ref 0.55–1.02)
DIFFERENTIAL METHOD BLD: ABNORMAL
EKG ATRIAL RATE: 79 BPM
EKG DIAGNOSIS: NORMAL
EKG Q-T INTERVAL: 438 MS
EKG QRS DURATION: 140 MS
EKG QTC CALCULATION (BAZETT): 505 MS
EKG R AXIS: 253 DEGREES
EKG T AXIS: 75 DEGREES
EKG VENTRICULAR RATE: 80 BPM
EOSINOPHIL # BLD: 0.1 K/UL (ref 0–0.4)
EOSINOPHIL NFR BLD: 1 % (ref 0–7)
ERYTHROCYTE [DISTWIDTH] IN BLOOD BY AUTOMATED COUNT: 16.5 % (ref 11.5–14.5)
GLUCOSE BLD STRIP.AUTO-MCNC: 135 MG/DL (ref 65–117)
GLUCOSE BLD STRIP.AUTO-MCNC: 136 MG/DL (ref 65–117)
GLUCOSE SERPL-MCNC: 201 MG/DL (ref 65–100)
HCT VFR BLD AUTO: 31.5 % (ref 35–47)
HGB BLD-MCNC: 9.9 G/DL (ref 11.5–16)
IMM GRANULOCYTES # BLD AUTO: 0.1 K/UL (ref 0–0.04)
IMM GRANULOCYTES NFR BLD AUTO: 1 % (ref 0–0.5)
LYMPHOCYTES # BLD: 0.5 K/UL (ref 0.8–3.5)
LYMPHOCYTES NFR BLD: 6 % (ref 12–49)
MCH RBC QN AUTO: 26.7 PG (ref 26–34)
MCHC RBC AUTO-ENTMCNC: 31.4 G/DL (ref 30–36.5)
MCV RBC AUTO: 84.9 FL (ref 80–99)
MONOCYTES # BLD: 0.7 K/UL (ref 0–1)
MONOCYTES NFR BLD: 8 % (ref 5–13)
NEUTS SEG # BLD: 7.5 K/UL (ref 1.8–8)
NEUTS SEG NFR BLD: 84 % (ref 32–75)
NRBC # BLD: 0 K/UL (ref 0–0.01)
NRBC BLD-RTO: 0 PER 100 WBC
PLATELET # BLD AUTO: 240 K/UL (ref 150–400)
PMV BLD AUTO: 10.2 FL (ref 8.9–12.9)
POTASSIUM SERPL-SCNC: 3.4 MMOL/L (ref 3.5–5.1)
RBC # BLD AUTO: 3.71 M/UL (ref 3.8–5.2)
SERVICE CMNT-IMP: ABNORMAL
SERVICE CMNT-IMP: ABNORMAL
SODIUM SERPL-SCNC: 138 MMOL/L (ref 136–145)
WBC # BLD AUTO: 8.9 K/UL (ref 3.6–11)

## 2024-10-22 PROCEDURE — 94640 AIRWAY INHALATION TREATMENT: CPT

## 2024-10-22 PROCEDURE — 94761 N-INVAS EAR/PLS OXIMETRY MLT: CPT

## 2024-10-22 PROCEDURE — 6370000000 HC RX 637 (ALT 250 FOR IP)

## 2024-10-22 PROCEDURE — 36415 COLL VENOUS BLD VENIPUNCTURE: CPT

## 2024-10-22 PROCEDURE — 82962 GLUCOSE BLOOD TEST: CPT

## 2024-10-22 PROCEDURE — 6360000002 HC RX W HCPCS: Performed by: STUDENT IN AN ORGANIZED HEALTH CARE EDUCATION/TRAINING PROGRAM

## 2024-10-22 PROCEDURE — 85025 COMPLETE CBC W/AUTO DIFF WBC: CPT

## 2024-10-22 PROCEDURE — 6370000000 HC RX 637 (ALT 250 FOR IP): Performed by: STUDENT IN AN ORGANIZED HEALTH CARE EDUCATION/TRAINING PROGRAM

## 2024-10-22 PROCEDURE — 6370000000 HC RX 637 (ALT 250 FOR IP): Performed by: INTERNAL MEDICINE

## 2024-10-22 PROCEDURE — 80048 BASIC METABOLIC PNL TOTAL CA: CPT

## 2024-10-22 PROCEDURE — APPNB180 APP NON BILLABLE TIME > 60 MINS: Performed by: ORTHOPAEDIC SURGERY

## 2024-10-22 PROCEDURE — 2580000003 HC RX 258: Performed by: STUDENT IN AN ORGANIZED HEALTH CARE EDUCATION/TRAINING PROGRAM

## 2024-10-22 PROCEDURE — 1100000003 HC PRIVATE W/ TELEMETRY

## 2024-10-22 RX ORDER — CASTOR OIL AND BALSAM, PERU 788; 87 MG/G; MG/G
OINTMENT TOPICAL 2 TIMES DAILY
Status: DISCONTINUED | OUTPATIENT
Start: 2024-10-22 | End: 2024-10-30 | Stop reason: HOSPADM

## 2024-10-22 RX ORDER — LORAZEPAM 0.5 MG/1
0.5 TABLET ORAL EVERY 12 HOURS PRN
Status: DISCONTINUED | OUTPATIENT
Start: 2024-10-22 | End: 2024-10-30 | Stop reason: HOSPADM

## 2024-10-22 RX ORDER — HALOPERIDOL 5 MG/ML
1 INJECTION INTRAMUSCULAR EVERY 6 HOURS PRN
Status: DISCONTINUED | OUTPATIENT
Start: 2024-10-22 | End: 2024-10-30 | Stop reason: HOSPADM

## 2024-10-22 RX ADMIN — SODIUM CHLORIDE, PRESERVATIVE FREE 10 ML: 5 INJECTION INTRAVENOUS at 00:34

## 2024-10-22 RX ADMIN — ACETAMINOPHEN 650 MG: 325 TABLET ORAL at 00:26

## 2024-10-22 RX ADMIN — Medication 3 MG: at 00:34

## 2024-10-22 RX ADMIN — BUMETANIDE 2 MG: 1 TABLET ORAL at 16:29

## 2024-10-22 RX ADMIN — Medication 3 MG: at 21:19

## 2024-10-22 RX ADMIN — ISOSORBIDE MONONITRATE 60 MG: 30 TABLET, EXTENDED RELEASE ORAL at 08:17

## 2024-10-22 RX ADMIN — PREGABALIN 75 MG: 75 CAPSULE ORAL at 08:17

## 2024-10-22 RX ADMIN — Medication: at 00:28

## 2024-10-22 RX ADMIN — PANTOPRAZOLE SODIUM 40 MG: 40 TABLET, DELAYED RELEASE ORAL at 08:17

## 2024-10-22 RX ADMIN — CARVEDILOL 25 MG: 12.5 TABLET, FILM COATED ORAL at 16:29

## 2024-10-22 RX ADMIN — OXYCODONE 5 MG: 5 TABLET ORAL at 00:26

## 2024-10-22 RX ADMIN — OXYCODONE 5 MG: 5 TABLET ORAL at 21:21

## 2024-10-22 RX ADMIN — CEFEPIME 2000 MG: 2 INJECTION, POWDER, FOR SOLUTION INTRAVENOUS at 18:15

## 2024-10-22 RX ADMIN — ACETAMINOPHEN 650 MG: 325 TABLET ORAL at 21:19

## 2024-10-22 RX ADMIN — VANCOMYCIN HYDROCHLORIDE 500 MG: 500 INJECTION, POWDER, LYOPHILIZED, FOR SOLUTION INTRAVENOUS at 11:08

## 2024-10-22 RX ADMIN — ENOXAPARIN SODIUM 30 MG: 100 INJECTION SUBCUTANEOUS at 08:17

## 2024-10-22 RX ADMIN — CARVEDILOL 25 MG: 12.5 TABLET, FILM COATED ORAL at 08:17

## 2024-10-22 RX ADMIN — SODIUM CHLORIDE, PRESERVATIVE FREE 10 ML: 5 INJECTION INTRAVENOUS at 08:22

## 2024-10-22 RX ADMIN — LORAZEPAM 0.5 MG: 0.5 TABLET ORAL at 16:29

## 2024-10-22 RX ADMIN — ASPIRIN 81 MG: 81 TABLET, CHEWABLE ORAL at 08:17

## 2024-10-22 RX ADMIN — VANCOMYCIN HYDROCHLORIDE 500 MG: 500 INJECTION, POWDER, LYOPHILIZED, FOR SOLUTION INTRAVENOUS at 21:28

## 2024-10-22 RX ADMIN — ENOXAPARIN SODIUM 30 MG: 100 INJECTION SUBCUTANEOUS at 00:28

## 2024-10-22 RX ADMIN — SODIUM CHLORIDE, PRESERVATIVE FREE 10 ML: 5 INJECTION INTRAVENOUS at 21:33

## 2024-10-22 RX ADMIN — ONDANSETRON 4 MG: 2 INJECTION INTRAMUSCULAR; INTRAVENOUS at 21:24

## 2024-10-22 RX ADMIN — Medication: at 21:15

## 2024-10-22 RX ADMIN — FERROUS SULFATE TAB 325 MG (65 MG ELEMENTAL FE) 325 MG: 325 (65 FE) TAB at 08:17

## 2024-10-22 RX ADMIN — ONDANSETRON 4 MG: 2 INJECTION INTRAMUSCULAR; INTRAVENOUS at 00:59

## 2024-10-22 RX ADMIN — ARFORMOTEROL TARTRATE: 15 SOLUTION RESPIRATORY (INHALATION) at 07:57

## 2024-10-22 RX ADMIN — BUMETANIDE 2 MG: 1 TABLET ORAL at 08:17

## 2024-10-22 RX ADMIN — ONDANSETRON 4 MG: 2 INJECTION INTRAMUSCULAR; INTRAVENOUS at 08:30

## 2024-10-22 RX ADMIN — Medication: at 08:17

## 2024-10-22 ASSESSMENT — PAIN DESCRIPTION - FREQUENCY
FREQUENCY: INTERMITTENT
FREQUENCY: CONTINUOUS

## 2024-10-22 ASSESSMENT — PAIN DESCRIPTION - ONSET
ONSET: ON-GOING
ONSET: SUDDEN

## 2024-10-22 ASSESSMENT — PAIN DESCRIPTION - LOCATION
LOCATION: ANKLE
LOCATION: ANKLE

## 2024-10-22 ASSESSMENT — PAIN DESCRIPTION - DESCRIPTORS
DESCRIPTORS: ACHING
DESCRIPTORS: ACHING

## 2024-10-22 ASSESSMENT — PAIN SCALES - GENERAL
PAINLEVEL_OUTOF10: 0
PAINLEVEL_OUTOF10: 10
PAINLEVEL_OUTOF10: 8
PAINLEVEL_OUTOF10: 0

## 2024-10-22 ASSESSMENT — PAIN DESCRIPTION - PAIN TYPE
TYPE: ACUTE PAIN
TYPE: ACUTE PAIN

## 2024-10-22 ASSESSMENT — PAIN DESCRIPTION - ORIENTATION: ORIENTATION: LEFT

## 2024-10-22 NOTE — ED NOTES
Reported to Yared (RN) we discussed the patient condition , plan of care and her needs at this time.

## 2024-10-22 NOTE — H&P
Hospitalist Admission Note    NAME:  Marilee Oakes   :  1950   MRN:  [id number]     Date/Time:  10/22/2024 12:09 AM    Patient PCP: Darien Potter MD    ______________________________________________________________________  Given the patient's current clinical presentation, I have a high level of concern for decompensation if discharged from the emergency department.  Complex decision making was performed, which includes reviewing the patient's available past medical records, laboratory results, and x-ray films.       My assessment of this patient's clinical condition and my plan of care is as follows.    Assessment / Plan:    Active Problems:  Left lower extremity cellulitis  Chronic wounds  History left ankle fracture complicated by MRSA infection requiring hardware removal with recurrent surgical site infections  CKD 3B, at baseline  Normocytic anemia, at baseline  History ATN attributed to daptomycin  Diastolic CHF  CAD  Essential hypertension  Hyperlipidemia  COPD with chronic hypoxemic respiratory failure 2-3 L requirement  History owning    Plan:  Left lower extremity cellulitis  Chronic wounds  History left ankle fracture complicated by MRSA infection requiring hardware removal with recurrent surgical site infections  Admit to telemetry monitoring  Wound care consulted  Start empiric cefepime and vancomycin  Orthopedics consulted, greatly appreciate their expertise  Question friction blister/injury from poorly fitting walking boot given distribution of wounds  Continue PTA Lyrica  Tylenol and/or oxycodone available as needed pain    CKD 3B, at baseline  History ATN attributed to daptomycin   called to emphasize need to avoid daptomycin  Given history MRSA infection have limited options-will utilize vancomycin with close monitoring  -Pharmacy following levels  -Appears she tolerated cefepime and vancomycin well during prior hospitalization  Trend renal function  Renally dose

## 2024-10-23 ENCOUNTER — APPOINTMENT (OUTPATIENT)
Facility: HOSPITAL | Age: 74
DRG: 857 | End: 2024-10-23
Payer: MEDICARE

## 2024-10-23 ENCOUNTER — ANESTHESIA EVENT (OUTPATIENT)
Facility: HOSPITAL | Age: 74
End: 2024-10-23
Payer: MEDICARE

## 2024-10-23 ENCOUNTER — ANESTHESIA (OUTPATIENT)
Facility: HOSPITAL | Age: 74
End: 2024-10-23
Payer: MEDICARE

## 2024-10-23 LAB
ANION GAP SERPL CALC-SCNC: 3 MMOL/L (ref 2–12)
BUN SERPL-MCNC: 18 MG/DL (ref 6–20)
BUN/CREAT SERPL: 12 (ref 12–20)
CALCIUM SERPL-MCNC: 9.8 MG/DL (ref 8.5–10.1)
CHLORIDE SERPL-SCNC: 104 MMOL/L (ref 97–108)
CO2 SERPL-SCNC: 31 MMOL/L (ref 21–32)
CREAT SERPL-MCNC: 1.45 MG/DL (ref 0.55–1.02)
GLUCOSE BLD STRIP.AUTO-MCNC: 140 MG/DL (ref 65–117)
GLUCOSE BLD STRIP.AUTO-MCNC: 197 MG/DL (ref 65–117)
GLUCOSE BLD STRIP.AUTO-MCNC: 209 MG/DL (ref 65–117)
GLUCOSE SERPL-MCNC: 184 MG/DL (ref 65–100)
POTASSIUM SERPL-SCNC: 3.1 MMOL/L (ref 3.5–5.1)
SERVICE CMNT-IMP: ABNORMAL
SODIUM SERPL-SCNC: 138 MMOL/L (ref 136–145)
VANCOMYCIN SERPL-MCNC: 21.9 UG/ML

## 2024-10-23 PROCEDURE — 2580000003 HC RX 258: Performed by: ORTHOPAEDIC SURGERY

## 2024-10-23 PROCEDURE — 87077 CULTURE AEROBIC IDENTIFY: CPT

## 2024-10-23 PROCEDURE — 94640 AIRWAY INHALATION TREATMENT: CPT

## 2024-10-23 PROCEDURE — 7100000001 HC PACU RECOVERY - ADDTL 15 MIN: Performed by: ORTHOPAEDIC SURGERY

## 2024-10-23 PROCEDURE — 6360000002 HC RX W HCPCS: Performed by: ORTHOPAEDIC SURGERY

## 2024-10-23 PROCEDURE — 2500000003 HC RX 250 WO HCPCS

## 2024-10-23 PROCEDURE — 6360000002 HC RX W HCPCS: Performed by: STUDENT IN AN ORGANIZED HEALTH CARE EDUCATION/TRAINING PROGRAM

## 2024-10-23 PROCEDURE — 1100000003 HC PRIVATE W/ TELEMETRY

## 2024-10-23 PROCEDURE — 2580000003 HC RX 258: Performed by: NURSE PRACTITIONER

## 2024-10-23 PROCEDURE — XW0V0P7 INTRODUCTION OF ANTIBIOTIC-ELUTING BONE VOID FILLER INTO BONES, OPEN APPROACH, NEW TECHNOLOGY GROUP 7: ICD-10-PCS | Performed by: ORTHOPAEDIC SURGERY

## 2024-10-23 PROCEDURE — 2580000003 HC RX 258

## 2024-10-23 PROCEDURE — 2700000000 HC OXYGEN THERAPY PER DAY

## 2024-10-23 PROCEDURE — 6360000002 HC RX W HCPCS

## 2024-10-23 PROCEDURE — 3700000000 HC ANESTHESIA ATTENDED CARE: Performed by: ORTHOPAEDIC SURGERY

## 2024-10-23 PROCEDURE — 80202 ASSAY OF VANCOMYCIN: CPT

## 2024-10-23 PROCEDURE — 0QPH04Z REMOVAL OF INTERNAL FIXATION DEVICE FROM LEFT TIBIA, OPEN APPROACH: ICD-10-PCS | Performed by: ORTHOPAEDIC SURGERY

## 2024-10-23 PROCEDURE — 6370000000 HC RX 637 (ALT 250 FOR IP): Performed by: ORTHOPAEDIC SURGERY

## 2024-10-23 PROCEDURE — 6370000000 HC RX 637 (ALT 250 FOR IP): Performed by: INTERNAL MEDICINE

## 2024-10-23 PROCEDURE — 2709999900 HC NON-CHARGEABLE SUPPLY: Performed by: ORTHOPAEDIC SURGERY

## 2024-10-23 PROCEDURE — 0QBM0ZZ EXCISION OF LEFT TARSAL, OPEN APPROACH: ICD-10-PCS | Performed by: ORTHOPAEDIC SURGERY

## 2024-10-23 PROCEDURE — 3700000001 HC ADD 15 MINUTES (ANESTHESIA): Performed by: ORTHOPAEDIC SURGERY

## 2024-10-23 PROCEDURE — 82962 GLUCOSE BLOOD TEST: CPT

## 2024-10-23 PROCEDURE — 3600000014 HC SURGERY LEVEL 4 ADDTL 15MIN: Performed by: ORTHOPAEDIC SURGERY

## 2024-10-23 PROCEDURE — 87205 SMEAR GRAM STAIN: CPT

## 2024-10-23 PROCEDURE — 0QPM04Z REMOVAL OF INTERNAL FIXATION DEVICE FROM LEFT TARSAL, OPEN APPROACH: ICD-10-PCS | Performed by: ORTHOPAEDIC SURGERY

## 2024-10-23 PROCEDURE — 94760 N-INVAS EAR/PLS OXIMETRY 1: CPT

## 2024-10-23 PROCEDURE — A4217 STERILE WATER/SALINE, 500 ML: HCPCS | Performed by: ORTHOPAEDIC SURGERY

## 2024-10-23 PROCEDURE — 6370000000 HC RX 637 (ALT 250 FOR IP): Performed by: STUDENT IN AN ORGANIZED HEALTH CARE EDUCATION/TRAINING PROGRAM

## 2024-10-23 PROCEDURE — 80048 BASIC METABOLIC PNL TOTAL CA: CPT

## 2024-10-23 PROCEDURE — 7100000000 HC PACU RECOVERY - FIRST 15 MIN: Performed by: ORTHOPAEDIC SURGERY

## 2024-10-23 PROCEDURE — 87075 CULTR BACTERIA EXCEPT BLOOD: CPT

## 2024-10-23 PROCEDURE — 36415 COLL VENOUS BLD VENIPUNCTURE: CPT

## 2024-10-23 PROCEDURE — 87102 FUNGUS ISOLATION CULTURE: CPT

## 2024-10-23 PROCEDURE — 87070 CULTURE OTHR SPECIMN AEROBIC: CPT

## 2024-10-23 PROCEDURE — 87186 SC STD MICRODIL/AGAR DIL: CPT

## 2024-10-23 PROCEDURE — 0QBH0ZZ EXCISION OF LEFT TIBIA, OPEN APPROACH: ICD-10-PCS | Performed by: ORTHOPAEDIC SURGERY

## 2024-10-23 PROCEDURE — 3600000004 HC SURGERY LEVEL 4 BASE: Performed by: ORTHOPAEDIC SURGERY

## 2024-10-23 PROCEDURE — 2580000003 HC RX 258: Performed by: STUDENT IN AN ORGANIZED HEALTH CARE EDUCATION/TRAINING PROGRAM

## 2024-10-23 PROCEDURE — C1602 HC ORTHO MATRIX BONE FILL, DRUG ELUTING: HCPCS | Performed by: ORTHOPAEDIC SURGERY

## 2024-10-23 PROCEDURE — 6360000002 HC RX W HCPCS: Performed by: NURSE PRACTITIONER

## 2024-10-23 DEVICE — CERAMENT G IS AN IMPLANTABLE BONE VOID FILLER (DEVICE/ DRUG COMBINATION PRODUCT) INDICATED FOR USE AS AN ADJUNCT TO SYSTEMIC ANTIBIOTIC THERAPY AND SURGICAL DEBRIDEMENT (STANDARD TREATMENT APPROACH TO A BONE INFECTION) WHERE THERE IS A NEED FOR SUPPLEMENTAL BONE GRAFT. CERAMENT G COMBINES GENTAMICIN SULFATE WITH A BONE VOID FILLER, CONSISTING OF HYDROXYAPATITE AND CALCIUM SULFATE. BY ELUTING GENTAMICIN, CERAMENT G CAN REDUCE THE RECURRENCE OF CHRONIC OSTEOMYELITIS FROM GENTAMICIN-SENSITIVE MICROORGANISMS IN ORDER TO PROTECT BONE HEALING CERAMENT G CAN ALSO REDUCE THE LIKELIHOOD OF INFECTION SUBSEQUENT TO AN OPEN FRACTURE. BY COMBINING CALCIUM SULFATE AND HYDROXYAPATITE, A BALANCE IS ACHIEVED BETWEEN IMPLANT RESORPTION RATE AND BONE REMODELING RATE. CALCIUM SULFATE ACTS AS A RESORBABLE CARRIER FOR HYDROXYAPATITE. HYDROXYAPATITE HAS A SLOW RESORPTION RATE AND HIGH OSTEOCONDUCTIVITY PROVIDING A SCAFFOLD FOR NEW BONE GENERATION. THE USE OF CERAMENT G ELIMINATES THE NEED TO HARVEST AUTOLOGOUS BONE, THEREBY AVOIDING DONOR SITE MORBIDITY (E.G., PAIN, INFECTION, ETC.) IN PATIENTS WITH A DIAGNOSED INFECTION. CERAMENT G MAY BE IMPLANTED BY AN INJECTABLE SYSTEM. IN BONE INFECTION, CERAMENT G CAN ALSO BE INSERTED AS PRE-SET BEADS.
Type: IMPLANTABLE DEVICE | Site: ANKLE | Status: FUNCTIONAL
Brand: CERAMENT® G

## 2024-10-23 RX ORDER — ONDANSETRON 4 MG/1
4 TABLET, ORALLY DISINTEGRATING ORAL EVERY 8 HOURS PRN
Status: DISCONTINUED | OUTPATIENT
Start: 2024-10-23 | End: 2024-10-30 | Stop reason: HOSPADM

## 2024-10-23 RX ORDER — SODIUM CHLORIDE 0.9 % (FLUSH) 0.9 %
5-40 SYRINGE (ML) INJECTION EVERY 12 HOURS SCHEDULED
Status: DISCONTINUED | OUTPATIENT
Start: 2024-10-23 | End: 2024-10-30 | Stop reason: HOSPADM

## 2024-10-23 RX ORDER — SODIUM CHLORIDE 0.9 % (FLUSH) 0.9 %
5-40 SYRINGE (ML) INJECTION PRN
Status: DISCONTINUED | OUTPATIENT
Start: 2024-10-23 | End: 2024-10-30 | Stop reason: HOSPADM

## 2024-10-23 RX ORDER — MICONAZOLE NITRATE 20 MG/G
CREAM TOPICAL 2 TIMES DAILY
Status: DISCONTINUED | OUTPATIENT
Start: 2024-10-23 | End: 2024-10-30 | Stop reason: HOSPADM

## 2024-10-23 RX ORDER — PHENYLEPHRINE HCL IN 0.9% NACL 0.4MG/10ML
SYRINGE (ML) INTRAVENOUS
Status: DISCONTINUED | OUTPATIENT
Start: 2024-10-23 | End: 2024-10-23 | Stop reason: SDUPTHER

## 2024-10-23 RX ORDER — SODIUM CHLORIDE, SODIUM LACTATE, POTASSIUM CHLORIDE, CALCIUM CHLORIDE 600; 310; 30; 20 MG/100ML; MG/100ML; MG/100ML; MG/100ML
INJECTION, SOLUTION INTRAVENOUS
Status: DISCONTINUED | OUTPATIENT
Start: 2024-10-23 | End: 2024-10-23 | Stop reason: SDUPTHER

## 2024-10-23 RX ORDER — PROPOFOL 10 MG/ML
INJECTION, EMULSION INTRAVENOUS
Status: DISCONTINUED | OUTPATIENT
Start: 2024-10-23 | End: 2024-10-23 | Stop reason: SDUPTHER

## 2024-10-23 RX ORDER — DEXAMETHASONE SODIUM PHOSPHATE 4 MG/ML
INJECTION, SOLUTION INTRA-ARTICULAR; INTRALESIONAL; INTRAMUSCULAR; INTRAVENOUS; SOFT TISSUE
Status: DISCONTINUED | OUTPATIENT
Start: 2024-10-23 | End: 2024-10-23 | Stop reason: SDUPTHER

## 2024-10-23 RX ORDER — ENOXAPARIN SODIUM 100 MG/ML
30 INJECTION SUBCUTANEOUS 2 TIMES DAILY
Status: DISCONTINUED | OUTPATIENT
Start: 2024-10-23 | End: 2024-10-30 | Stop reason: HOSPADM

## 2024-10-23 RX ORDER — FENTANYL CITRATE 50 UG/ML
INJECTION, SOLUTION INTRAMUSCULAR; INTRAVENOUS
Status: DISCONTINUED | OUTPATIENT
Start: 2024-10-23 | End: 2024-10-23 | Stop reason: SDUPTHER

## 2024-10-23 RX ORDER — GLYCOPYRROLATE 0.2 MG/ML
INJECTION INTRAMUSCULAR; INTRAVENOUS
Status: DISCONTINUED | OUTPATIENT
Start: 2024-10-23 | End: 2024-10-23 | Stop reason: SDUPTHER

## 2024-10-23 RX ORDER — ASPIRIN 81 MG/1
81 TABLET, CHEWABLE ORAL DAILY
Status: DISCONTINUED | OUTPATIENT
Start: 2024-10-24 | End: 2024-10-30 | Stop reason: HOSPADM

## 2024-10-23 RX ORDER — ONDANSETRON 2 MG/ML
INJECTION INTRAMUSCULAR; INTRAVENOUS
Status: DISCONTINUED | OUTPATIENT
Start: 2024-10-23 | End: 2024-10-23 | Stop reason: SDUPTHER

## 2024-10-23 RX ORDER — ONDANSETRON 2 MG/ML
4 INJECTION INTRAMUSCULAR; INTRAVENOUS EVERY 6 HOURS PRN
Status: DISCONTINUED | OUTPATIENT
Start: 2024-10-23 | End: 2024-10-30 | Stop reason: HOSPADM

## 2024-10-23 RX ORDER — ROCURONIUM BROMIDE 10 MG/ML
INJECTION, SOLUTION INTRAVENOUS
Status: DISCONTINUED | OUTPATIENT
Start: 2024-10-23 | End: 2024-10-23 | Stop reason: SDUPTHER

## 2024-10-23 RX ORDER — EPHEDRINE SULFATE/0.9% NACL/PF 25 MG/5 ML
SYRINGE (ML) INTRAVENOUS
Status: DISCONTINUED | OUTPATIENT
Start: 2024-10-23 | End: 2024-10-23 | Stop reason: SDUPTHER

## 2024-10-23 RX ORDER — POTASSIUM CHLORIDE 1500 MG/1
40 TABLET, EXTENDED RELEASE ORAL ONCE
Status: COMPLETED | OUTPATIENT
Start: 2024-10-23 | End: 2024-10-23

## 2024-10-23 RX ORDER — NEOSTIGMINE METHYLSULFATE 1 MG/ML
INJECTION, SOLUTION INTRAVENOUS
Status: DISCONTINUED | OUTPATIENT
Start: 2024-10-23 | End: 2024-10-23 | Stop reason: SDUPTHER

## 2024-10-23 RX ORDER — MIDAZOLAM HYDROCHLORIDE 1 MG/ML
INJECTION, SOLUTION INTRAMUSCULAR; INTRAVENOUS
Status: DISCONTINUED | OUTPATIENT
Start: 2024-10-23 | End: 2024-10-23 | Stop reason: SDUPTHER

## 2024-10-23 RX ORDER — SUCCINYLCHOLINE/SOD CL,ISO/PF 200MG/10ML
SYRINGE (ML) INTRAVENOUS
Status: DISCONTINUED | OUTPATIENT
Start: 2024-10-23 | End: 2024-10-23 | Stop reason: SDUPTHER

## 2024-10-23 RX ADMIN — SODIUM CHLORIDE, PRESERVATIVE FREE 10 ML: 5 INJECTION INTRAVENOUS at 20:17

## 2024-10-23 RX ADMIN — PANTOPRAZOLE SODIUM 40 MG: 40 TABLET, DELAYED RELEASE ORAL at 05:52

## 2024-10-23 RX ADMIN — SODIUM CHLORIDE, PRESERVATIVE FREE 10 ML: 5 INJECTION INTRAVENOUS at 20:11

## 2024-10-23 RX ADMIN — VANCOMYCIN HYDROCHLORIDE 500 MG: 500 INJECTION, POWDER, LYOPHILIZED, FOR SOLUTION INTRAVENOUS at 10:48

## 2024-10-23 RX ADMIN — PROPOFOL 100 MG: 10 INJECTION, EMULSION INTRAVENOUS at 13:05

## 2024-10-23 RX ADMIN — HYDROMORPHONE HYDROCHLORIDE 0.25 MG: 1 INJECTION, SOLUTION INTRAMUSCULAR; INTRAVENOUS; SUBCUTANEOUS at 15:22

## 2024-10-23 RX ADMIN — LORAZEPAM 0.5 MG: 0.5 TABLET ORAL at 05:52

## 2024-10-23 RX ADMIN — Medication 120 MCG: at 13:14

## 2024-10-23 RX ADMIN — HYDROMORPHONE HYDROCHLORIDE 0.25 MG: 1 INJECTION, SOLUTION INTRAMUSCULAR; INTRAVENOUS; SUBCUTANEOUS at 14:58

## 2024-10-23 RX ADMIN — ROCURONIUM BROMIDE 10 MG: 10 INJECTION INTRAVENOUS at 13:45

## 2024-10-23 RX ADMIN — VANCOMYCIN HYDROCHLORIDE 500 MG: 500 INJECTION, POWDER, LYOPHILIZED, FOR SOLUTION INTRAVENOUS at 22:22

## 2024-10-23 RX ADMIN — Medication 3 MG: at 20:10

## 2024-10-23 RX ADMIN — CARVEDILOL 25 MG: 12.5 TABLET, FILM COATED ORAL at 18:45

## 2024-10-23 RX ADMIN — PROPOFOL 30 MG: 10 INJECTION, EMULSION INTRAVENOUS at 15:18

## 2024-10-23 RX ADMIN — EPHEDRINE SULFATE 10 MG: 5 INJECTION INTRAVENOUS at 13:11

## 2024-10-23 RX ADMIN — Medication 120 MCG: at 13:12

## 2024-10-23 RX ADMIN — CEFEPIME 2000 MG: 2 INJECTION, POWDER, FOR SOLUTION INTRAVENOUS at 18:59

## 2024-10-23 RX ADMIN — SODIUM CHLORIDE, POTASSIUM CHLORIDE, SODIUM LACTATE AND CALCIUM CHLORIDE: 600; 310; 30; 20 INJECTION, SOLUTION INTRAVENOUS at 12:56

## 2024-10-23 RX ADMIN — HYDROMORPHONE HYDROCHLORIDE 0.25 MG: 1 INJECTION, SOLUTION INTRAMUSCULAR; INTRAVENOUS; SUBCUTANEOUS at 13:52

## 2024-10-23 RX ADMIN — CARVEDILOL 25 MG: 12.5 TABLET, FILM COATED ORAL at 10:32

## 2024-10-23 RX ADMIN — Medication 120 MG: at 13:06

## 2024-10-23 RX ADMIN — HYDROMORPHONE HYDROCHLORIDE 0.25 MG: 1 INJECTION, SOLUTION INTRAMUSCULAR; INTRAVENOUS; SUBCUTANEOUS at 15:40

## 2024-10-23 RX ADMIN — GLYCOPYRROLATE 0.4 MG: 0.2 INJECTION INTRAMUSCULAR; INTRAVENOUS at 16:17

## 2024-10-23 RX ADMIN — SODIUM CHLORIDE, PRESERVATIVE FREE 10 ML: 5 INJECTION INTRAVENOUS at 10:33

## 2024-10-23 RX ADMIN — FENTANYL CITRATE 25 MCG: 50 INJECTION, SOLUTION INTRAMUSCULAR; INTRAVENOUS at 13:48

## 2024-10-23 RX ADMIN — SODIUM CHLORIDE, PRESERVATIVE FREE 10 ML: 5 INJECTION INTRAVENOUS at 20:18

## 2024-10-23 RX ADMIN — ONDANSETRON 4 MG: 2 INJECTION INTRAMUSCULAR; INTRAVENOUS at 01:12

## 2024-10-23 RX ADMIN — Medication: at 20:11

## 2024-10-23 RX ADMIN — FENTANYL CITRATE 25 MCG: 50 INJECTION, SOLUTION INTRAMUSCULAR; INTRAVENOUS at 13:32

## 2024-10-23 RX ADMIN — ONDANSETRON 4 MG: 2 INJECTION INTRAMUSCULAR; INTRAVENOUS at 05:49

## 2024-10-23 RX ADMIN — PREGABALIN 75 MG: 75 CAPSULE ORAL at 10:33

## 2024-10-23 RX ADMIN — ISOSORBIDE MONONITRATE 60 MG: 30 TABLET, EXTENDED RELEASE ORAL at 10:30

## 2024-10-23 RX ADMIN — BUMETANIDE 2 MG: 1 TABLET ORAL at 10:31

## 2024-10-23 RX ADMIN — DEXAMETHASONE SODIUM PHOSPHATE 4 MG: 4 INJECTION INTRA-ARTICULAR; INTRALESIONAL; INTRAMUSCULAR; INTRAVENOUS; SOFT TISSUE at 13:33

## 2024-10-23 RX ADMIN — POTASSIUM CHLORIDE 40 MEQ: 1500 TABLET, EXTENDED RELEASE ORAL at 11:00

## 2024-10-23 RX ADMIN — PHENYLEPHRINE HYDROCHLORIDE 40 MCG/MIN: 10 INJECTION INTRAVENOUS at 13:11

## 2024-10-23 RX ADMIN — Medication 3 MG: at 16:17

## 2024-10-23 RX ADMIN — PANTOPRAZOLE SODIUM 40 MG: 40 INJECTION, POWDER, FOR SOLUTION INTRAVENOUS at 03:50

## 2024-10-23 RX ADMIN — MIDAZOLAM HYDROCHLORIDE 2 MG: 1 INJECTION, SOLUTION INTRAMUSCULAR; INTRAVENOUS at 16:45

## 2024-10-23 RX ADMIN — ONDANSETRON 4 MG: 2 INJECTION INTRAMUSCULAR; INTRAVENOUS at 16:04

## 2024-10-23 RX ADMIN — ROCURONIUM BROMIDE 20 MG: 10 INJECTION INTRAVENOUS at 13:30

## 2024-10-23 RX ADMIN — FENTANYL CITRATE 50 MCG: 50 INJECTION, SOLUTION INTRAMUSCULAR; INTRAVENOUS at 13:05

## 2024-10-23 RX ADMIN — ARFORMOTEROL TARTRATE: 15 SOLUTION RESPIRATORY (INHALATION) at 22:04

## 2024-10-23 RX ADMIN — Medication 120 MCG: at 13:11

## 2024-10-23 RX ADMIN — EPHEDRINE SULFATE 15 MG: 5 INJECTION INTRAVENOUS at 13:14

## 2024-10-23 RX ADMIN — Medication: at 10:27

## 2024-10-23 RX ADMIN — ACETAMINOPHEN 650 MG: 325 TABLET ORAL at 20:10

## 2024-10-23 ASSESSMENT — PAIN SCALES - GENERAL
PAINLEVEL_OUTOF10: 0
PAINLEVEL_OUTOF10: 0
PAINLEVEL_OUTOF10: 4
PAINLEVEL_OUTOF10: 0

## 2024-10-23 ASSESSMENT — COPD QUESTIONNAIRES: CAT_SEVERITY: MODERATE

## 2024-10-23 ASSESSMENT — PAIN - FUNCTIONAL ASSESSMENT: PAIN_FUNCTIONAL_ASSESSMENT: 0-10

## 2024-10-23 NOTE — BRIEF OP NOTE
Brief Postoperative Note      Patient: Marilee Oakes  YOB: 1950  MRN: [id number]    Date of Procedure: 10/23/2024    Pre-Op Diagnosis Codes:      * Cellulitis of toe of right foot [L03.031]    Post-Op Diagnosis:  right foot and ankle talus, calcaneus and tibia deep infection (osteomyelitis) s/p ankle ORIF, ankle deep hardware removal and last procedure right tibiotalar and subtalar joint arthrodesis with TTC retrograde nail.        Procedure(s):  LEFT ANKLE INCISION AND DRAINAGE AND REMOVAL OF MTT NAIL REMOVAL of TTC (TibioTalarCalcaneal) Retrograde Nail and Interlock Screws.    Surgeon(s):  Maame Chan MD    Assistant:  * No surgical staff found *    Anesthesia: General    Estimated Blood Loss (mL): less than 50     Complications: None    Specimens:   ID Type Source Tests Collected by Time Destination   A : 1.  left lower heel wound Swab Leg CULTURE, ANAEROBIC, CULTURE, FUNGUS, CULTURE, WOUND Maame Chan MD 10/23/2024 1344    B : 2. left proximal tibia wound Swab Leg CULTURE, ANAEROBIC, CULTURE, FUNGUS, CULTURE, WOUND Maame Chan MD 10/23/2024 1348        Implants:  Implant Name Type Inv. Item Serial No.  Lot No. LRB No. Used Action   FILLER BNE GRFT 10 ML 60 CALCIUM SULF 40 HA GENTAMICIN - SNA  FILLER BNE GRFT 10 ML 60 CALCIUM SULF 40 HA GENTAMICIN NA BONE CARE INTL [employer] [id number] Left 2 Implanted   CAP END 0MM MICHELLE ELVIRA FOR CENTRONAIL ANK COMPR NAILING SYS - SNA  CAP END 0MM MICHELLE ELVIRA FOR CENTRONAIL ANK COMPR NAILING SYS NA [employer]TH[employer] INC-WD [id number] Left 1 Explanted   NAIL IM MICHELLE 10X200 MM ANK HINDFOOT COMPR TI CENTRONAIL - SNA  NAIL IM MICHELLE 10X200 MM ANK HINDFOOT COMPR TI CENTRONAIL NA ORTH[employer] INC-WD [id number] Left 1 Explanted   SCREW BNE L25MM DIA5MM TI THRD LO PROF - SNA  SCREW BNE L25MM DIA5MM TI THRD LO PROF NA ORTH[employer]-WD [id number] Left 1 Explanted   SCREW BNE L35MM DIA5MM TI THRD LO PROF - SNA  SCREW BNE L35MM DIA5MM TI THRD LO PROF NA ORTHOFIX INC-WD [id number] Left

## 2024-10-23 NOTE — WOUND CARE
Wound care nurse was consulted for this patient. Chart reviewed and patient assessed. Pt. Has been cared for by our wound care team in the past for the same ankle wounds and for her skin folds.    Pt. Is currently in surgery for the left ankle / leg wounds. Any wound are orders for the leg will be done by the surgeon.   Orders were written for the skin folds - cleanse the skin well, dry the skin and then apply the Secura Antifungal cream.   Plan: Pressure injury preventions with significant turns to either side to off load the sacrum. Her Oliver Score is listed at 16 (moderate risk).    Verónica Collins RN, BSN, CWON

## 2024-10-23 NOTE — ANESTHESIA PRE PROCEDURE
0.083% nebulizer solution 2.5 mg  2.5 mg Nebulization Q4H PRN Miguel Rivero, DO       • vancomycin (VANCOCIN) 500 mg in sodium chloride 0.9 % 100 mL IVPB (mini-bag)  500 mg IntraVENous Q12H Miguel Rivero,  mL/hr at 10/23/24 1048 500 mg at 10/23/24 1048   • ceFEPIme (MAXIPIME) 2,000 mg in sodium chloride 0.9 % 100 mL IVPB (mini-bag)  2,000 mg IntraVENous Q24H Miguel Rivero, DO   Stopped at 10/22/24 1848       Allergies:    Allergies   Allergen Reactions   • Daptomycin Rash     Needed dialysis after using this medication   • Sulfa Antibiotics Swelling   • Amoxicillin Swelling       Problem List:    Patient Active Problem List   Diagnosis Code   • Open wound of left lower leg S81.802A   • Essential hypertension I10   • IBS (irritable bowel syndrome) K58.9   • Ischemic cardiomyopathy I25.5   • Ingrown toenail of left foot L60.0   • Asthma J45.909   • Polypharmacy Z79.899   • Long-term use of high-risk medication Z79.899   • Lethargy R53.83   • Acute exacerbation of COPD with asthma ([facility]) J44.1   • Obesity (BMI 30-39.9) E66.9   • Chronic atrial fibrillation ([facility]) I48.20   • Hypercholesterolemia E78.00   • Presence of Watchman left atrial appendage closure device Z95.818   • Atrial fibrillation ([facility]) I48.91   • Spinal stenosis, lumbar region, without neurogenic claudication M48.061   • B12 deficiency E53.8   • Type 2 diabetes mellitus with diabetic neuropathy, without long-term current use of insulin ([facility]) E11.40   • Right knee DJD M17.11   • Recurrent UTI N39.0   • Type 2 diabetes mellitus with hyperglycemia, without long-term current use of insulin ([facility]) E11.65   • Ataxia R27.0   • Lower extremity edema R60.0   • Venous stasis dermatitis of both lower extremities I87.2   • Closed fracture of multiple ribs of right side with routine healing S22.41XD   • S/P placement of cardiac pacemaker Z95.0   • Chronic cholecystitis K81.1   • Cellulitis of left lower leg L03.116   • Anxiety and depression

## 2024-10-24 LAB
ANION GAP SERPL CALC-SCNC: 4 MMOL/L (ref 2–12)
BASOPHILS # BLD: 0 K/UL (ref 0–0.1)
BASOPHILS NFR BLD: 0 % (ref 0–1)
BUN SERPL-MCNC: 25 MG/DL (ref 6–20)
BUN/CREAT SERPL: 16 (ref 12–20)
CALCIUM SERPL-MCNC: 9.3 MG/DL (ref 8.5–10.1)
CHLORIDE SERPL-SCNC: 103 MMOL/L (ref 97–108)
CO2 SERPL-SCNC: 30 MMOL/L (ref 21–32)
CREAT SERPL-MCNC: 1.59 MG/DL (ref 0.55–1.02)
DIFFERENTIAL METHOD BLD: ABNORMAL
EOSINOPHIL # BLD: 0 K/UL (ref 0–0.4)
EOSINOPHIL NFR BLD: 0 % (ref 0–7)
ERYTHROCYTE [DISTWIDTH] IN BLOOD BY AUTOMATED COUNT: 15.9 % (ref 11.5–14.5)
GLUCOSE BLD STRIP.AUTO-MCNC: 167 MG/DL (ref 65–117)
GLUCOSE BLD STRIP.AUTO-MCNC: 176 MG/DL (ref 65–117)
GLUCOSE BLD STRIP.AUTO-MCNC: 202 MG/DL (ref 65–117)
GLUCOSE BLD STRIP.AUTO-MCNC: 231 MG/DL (ref 65–117)
GLUCOSE SERPL-MCNC: 216 MG/DL (ref 65–100)
HCT VFR BLD AUTO: 31.9 % (ref 35–47)
HGB BLD-MCNC: 10.2 G/DL (ref 11.5–16)
IMM GRANULOCYTES # BLD AUTO: 0.1 K/UL (ref 0–0.04)
IMM GRANULOCYTES NFR BLD AUTO: 1 % (ref 0–0.5)
LYMPHOCYTES # BLD: 0.7 K/UL (ref 0.8–3.5)
LYMPHOCYTES NFR BLD: 8 % (ref 12–49)
MCH RBC QN AUTO: 26.6 PG (ref 26–34)
MCHC RBC AUTO-ENTMCNC: 32 G/DL (ref 30–36.5)
MCV RBC AUTO: 83.3 FL (ref 80–99)
MONOCYTES # BLD: 0.7 K/UL (ref 0–1)
MONOCYTES NFR BLD: 8 % (ref 5–13)
NEUTS SEG # BLD: 7.3 K/UL (ref 1.8–8)
NEUTS SEG NFR BLD: 83 % (ref 32–75)
NRBC # BLD: 0 K/UL (ref 0–0.01)
NRBC BLD-RTO: 0 PER 100 WBC
PLATELET # BLD AUTO: 282 K/UL (ref 150–400)
PMV BLD AUTO: 10.2 FL (ref 8.9–12.9)
POTASSIUM SERPL-SCNC: 3.7 MMOL/L (ref 3.5–5.1)
RBC # BLD AUTO: 3.83 M/UL (ref 3.8–5.2)
RBC MORPH BLD: ABNORMAL
SERVICE CMNT-IMP: ABNORMAL
SODIUM SERPL-SCNC: 137 MMOL/L (ref 136–145)
WBC # BLD AUTO: 8.8 K/UL (ref 3.6–11)

## 2024-10-24 PROCEDURE — 2580000003 HC RX 258: Performed by: ORTHOPAEDIC SURGERY

## 2024-10-24 PROCEDURE — 1100000003 HC PRIVATE W/ TELEMETRY

## 2024-10-24 PROCEDURE — 82962 GLUCOSE BLOOD TEST: CPT

## 2024-10-24 PROCEDURE — 6360000002 HC RX W HCPCS: Performed by: INTERNAL MEDICINE

## 2024-10-24 PROCEDURE — 6370000000 HC RX 637 (ALT 250 FOR IP): Performed by: ORTHOPAEDIC SURGERY

## 2024-10-24 PROCEDURE — APPNB30 APP NON BILLABLE TIME 0-30 MINS: Performed by: PHYSICIAN ASSISTANT

## 2024-10-24 PROCEDURE — 94640 AIRWAY INHALATION TREATMENT: CPT

## 2024-10-24 PROCEDURE — 97530 THERAPEUTIC ACTIVITIES: CPT

## 2024-10-24 PROCEDURE — 97166 OT EVAL MOD COMPLEX 45 MIN: CPT

## 2024-10-24 PROCEDURE — 6360000002 HC RX W HCPCS: Performed by: ORTHOPAEDIC SURGERY

## 2024-10-24 PROCEDURE — 85025 COMPLETE CBC W/AUTO DIFF WBC: CPT

## 2024-10-24 PROCEDURE — 80048 BASIC METABOLIC PNL TOTAL CA: CPT

## 2024-10-24 PROCEDURE — 97162 PT EVAL MOD COMPLEX 30 MIN: CPT | Performed by: PHYSICAL THERAPIST

## 2024-10-24 PROCEDURE — 2700000000 HC OXYGEN THERAPY PER DAY

## 2024-10-24 PROCEDURE — 94760 N-INVAS EAR/PLS OXIMETRY 1: CPT

## 2024-10-24 PROCEDURE — 36415 COLL VENOUS BLD VENIPUNCTURE: CPT

## 2024-10-24 PROCEDURE — 97530 THERAPEUTIC ACTIVITIES: CPT | Performed by: PHYSICAL THERAPIST

## 2024-10-24 PROCEDURE — 97535 SELF CARE MNGMENT TRAINING: CPT

## 2024-10-24 PROCEDURE — 99233 SBSQ HOSP IP/OBS HIGH 50: CPT | Performed by: INTERNAL MEDICINE

## 2024-10-24 PROCEDURE — 2580000003 HC RX 258: Performed by: INTERNAL MEDICINE

## 2024-10-24 RX ADMIN — ACETAMINOPHEN 650 MG: 325 TABLET ORAL at 02:07

## 2024-10-24 RX ADMIN — VANCOMYCIN HYDROCHLORIDE 500 MG: 500 INJECTION, POWDER, LYOPHILIZED, FOR SOLUTION INTRAVENOUS at 23:08

## 2024-10-24 RX ADMIN — VANCOMYCIN HYDROCHLORIDE 500 MG: 500 INJECTION, POWDER, LYOPHILIZED, FOR SOLUTION INTRAVENOUS at 10:27

## 2024-10-24 RX ADMIN — MICONAZOLE NITRATE: 20 CREAM TOPICAL at 21:08

## 2024-10-24 RX ADMIN — MICONAZOLE NITRATE: 20 CREAM TOPICAL at 10:34

## 2024-10-24 RX ADMIN — ARFORMOTEROL TARTRATE: 15 SOLUTION RESPIRATORY (INHALATION) at 21:55

## 2024-10-24 RX ADMIN — Medication: at 10:11

## 2024-10-24 RX ADMIN — BUMETANIDE 2 MG: 1 TABLET ORAL at 18:35

## 2024-10-24 RX ADMIN — PANTOPRAZOLE SODIUM 40 MG: 40 TABLET, DELAYED RELEASE ORAL at 05:41

## 2024-10-24 RX ADMIN — ARFORMOTEROL TARTRATE: 15 SOLUTION RESPIRATORY (INHALATION) at 10:45

## 2024-10-24 RX ADMIN — ISOSORBIDE MONONITRATE 60 MG: 30 TABLET, EXTENDED RELEASE ORAL at 10:10

## 2024-10-24 RX ADMIN — CARVEDILOL 25 MG: 12.5 TABLET, FILM COATED ORAL at 10:34

## 2024-10-24 RX ADMIN — CEFEPIME 2000 MG: 2 INJECTION, POWDER, FOR SOLUTION INTRAVENOUS at 18:45

## 2024-10-24 RX ADMIN — CARVEDILOL 25 MG: 12.5 TABLET, FILM COATED ORAL at 18:35

## 2024-10-24 RX ADMIN — SODIUM CHLORIDE, PRESERVATIVE FREE 10 ML: 5 INJECTION INTRAVENOUS at 21:14

## 2024-10-24 RX ADMIN — FERROUS SULFATE TAB 325 MG (65 MG ELEMENTAL FE) 325 MG: 325 (65 FE) TAB at 10:34

## 2024-10-24 RX ADMIN — BUMETANIDE 2 MG: 1 TABLET ORAL at 10:11

## 2024-10-24 RX ADMIN — Medication 3 MG: at 21:14

## 2024-10-24 RX ADMIN — Medication: at 21:08

## 2024-10-24 RX ADMIN — ACETAMINOPHEN 650 MG: 325 TABLET ORAL at 21:16

## 2024-10-24 RX ADMIN — PREGABALIN 75 MG: 75 CAPSULE ORAL at 10:11

## 2024-10-24 RX ADMIN — LORAZEPAM 0.5 MG: 0.5 TABLET ORAL at 23:13

## 2024-10-24 ASSESSMENT — PAIN DESCRIPTION - PAIN TYPE
TYPE: ACUTE PAIN
TYPE: SURGICAL PAIN

## 2024-10-24 ASSESSMENT — PAIN DESCRIPTION - ONSET: ONSET: GRADUAL

## 2024-10-24 ASSESSMENT — PAIN DESCRIPTION - LOCATION
LOCATION: NECK;SHOULDER
LOCATION: HEAD

## 2024-10-24 ASSESSMENT — PAIN DESCRIPTION - DESCRIPTORS
DESCRIPTORS: ACHING
DESCRIPTORS: ACHING;DISCOMFORT

## 2024-10-24 ASSESSMENT — PAIN DESCRIPTION - ORIENTATION
ORIENTATION: LEFT
ORIENTATION: ANTERIOR

## 2024-10-24 ASSESSMENT — PAIN SCALES - GENERAL
PAINLEVEL_OUTOF10: 4
PAINLEVEL_OUTOF10: 5
PAINLEVEL_OUTOF10: 4

## 2024-10-24 ASSESSMENT — PAIN DESCRIPTION - FREQUENCY: FREQUENCY: INTERMITTENT

## 2024-10-24 NOTE — OP NOTE
[facility]              [address] [place]  [address]                            OPERATIVE REPORT      PATIENT NAME: NEDRA WEBER              : 1950  MED REC NO: [id number]                       ROOM: 106  ACCOUNT NO: [id number]                       ADMIT DATE: 10/21/2024  PROVIDER: Maame Chan MD    DATE OF SERVICE:  10/23/2024    PREOPERATIVE DIAGNOSES:       1. Left lower extremity infection status post history of tibiotalar calcaneal joint fusion, retrograde nail for fusion.     2. History of previous open reduction internal fixation and removal of hardware.     3. Other Diagnoses:  Diabetes type 2, poorly controlled with neuropathy.    POSTOPERATIVE DIAGNOSES:       1. Left lower extremity talus, calcaneus, tibia osteomyelitis, acute osteomyelitis, deep infection left, status post history of arthrodesis, open reduction internal fixation and removal of hardware.     2. Diabetes mellitus type 2, poorly controlled.  Multiple medical conditions including neuropathy.    PROCEDURES PERFORMED:       1. Left tibia partial excision bone, irrigation/debridement of tibial canal and local screw hole (CPT: 63118)        2. Left foot partial excision bone, irrigation/debridement of affect talus and calcaneus (CPT: 12151)     3  Left lower extremity retrograde intramedullary nail and screws removal. (CPT: 43673)                4. Application of CERAMENT synthetic bone cement with antibiotic.      5. Intraoperative ankle manual stress evaluation. (CPT 55952)    SURGEON:  Maame Chan MD    ASSISTANT:  None.    ANESTHESIA:  General endotracheal.    ESTIMATED BLOOD LOSS:  Less than 50 mL.    SPECIMENS REMOVED:  Specimens Obtained:  Culture and sensitivity from the deep infection, anaerobe, aerobe, tibial canal.    INTRAOPERATIVE FINDINGS: Deep infection lateral talus, posterior calcaneus, tibial canal     COMPLICATIONS:  None.    IMPLANTS:  CERAMENT G with

## 2024-10-24 NOTE — ANESTHESIA POSTPROCEDURE EVALUATION
Department of Anesthesiology  Postprocedure Note    Patient: Marilee Oakes  MRN: [id number]  YOB: 1950  Date of evaluation: 10/24/2024    Procedure Summary       Date: 10/23/24 Room / Location: [facility] MAIN OR M2 / [facility] MAIN OR    Anesthesia Start: 1256 Anesthesia Stop: 1655    Procedure: LEFT ANKLE INCISION AND DRAINAGE AND REMOVAL OF MTT NAIL (Left: Ankle) Diagnosis:       Cellulitis of toe of right foot      (Cellulitis of toe of right foot [L03.031])    Providers: Maame Chan MD Responsible Provider: Matt Lala MD    Anesthesia Type: General ASA Status: 4            Anesthesia Type: General    Amol Phase I: Amol Score: 8    Amol Phase II:      Anesthesia Post Evaluation    Patient location during evaluation: PACU  Patient participation: complete - patient participated  Level of consciousness: sleepy but conscious  Airway patency: patent  Nausea & Vomiting: no nausea and no vomiting  Cardiovascular status: hemodynamically stable  Respiratory status: acceptable and nasal cannula  Hydration status: stable  Multimodal analgesia pain management approach        No notable events documented.

## 2024-10-25 ENCOUNTER — APPOINTMENT (OUTPATIENT)
Facility: HOSPITAL | Age: 74
DRG: 857 | End: 2024-10-25
Payer: MEDICARE

## 2024-10-25 PROBLEM — E11.8 CONTROLLED DIABETES MELLITUS TYPE 2 WITH COMPLICATIONS (HCC): Status: ACTIVE | Noted: 2024-10-25

## 2024-10-25 PROBLEM — M86.062 ACUTE HEMATOGENOUS OSTEOMYELITIS OF LEFT TIBIA: Status: ACTIVE | Noted: 2024-10-25

## 2024-10-25 PROBLEM — A49.02 MRSA INFECTION: Status: ACTIVE | Noted: 2024-04-07

## 2024-10-25 LAB
ANION GAP SERPL CALC-SCNC: 5 MMOL/L (ref 2–12)
BACTERIA SPEC CULT: ABNORMAL
BACTERIA SPEC CULT: NORMAL
BACTERIA SPEC CULT: NORMAL
BASOPHILS # BLD: 0 K/UL (ref 0–0.1)
BASOPHILS NFR BLD: 0 % (ref 0–1)
BUN SERPL-MCNC: 36 MG/DL (ref 6–20)
BUN/CREAT SERPL: 18 (ref 12–20)
CALCIUM SERPL-MCNC: 9.3 MG/DL (ref 8.5–10.1)
CHLORIDE SERPL-SCNC: 101 MMOL/L (ref 97–108)
CO2 SERPL-SCNC: 31 MMOL/L (ref 21–32)
CREAT SERPL-MCNC: 1.97 MG/DL (ref 0.55–1.02)
CRP SERPL-MCNC: 7.58 MG/DL (ref 0–0.3)
DIFFERENTIAL METHOD BLD: ABNORMAL
EOSINOPHIL # BLD: 0 K/UL (ref 0–0.4)
EOSINOPHIL NFR BLD: 1 % (ref 0–7)
ERYTHROCYTE [DISTWIDTH] IN BLOOD BY AUTOMATED COUNT: 16.4 % (ref 11.5–14.5)
ERYTHROCYTE [SEDIMENTATION RATE] IN BLOOD: 63 MM/HR (ref 0–30)
GLUCOSE BLD STRIP.AUTO-MCNC: 156 MG/DL (ref 65–117)
GLUCOSE BLD STRIP.AUTO-MCNC: 217 MG/DL (ref 65–117)
GLUCOSE SERPL-MCNC: 158 MG/DL (ref 65–100)
GRAM STN SPEC: ABNORMAL
GRAM STN SPEC: ABNORMAL
HCT VFR BLD AUTO: 32.2 % (ref 35–47)
HGB BLD-MCNC: 10.2 G/DL (ref 11.5–16)
IMM GRANULOCYTES # BLD AUTO: 0.1 K/UL (ref 0–0.04)
IMM GRANULOCYTES NFR BLD AUTO: 1 % (ref 0–0.5)
LYMPHOCYTES # BLD: 0.9 K/UL (ref 0.8–3.5)
LYMPHOCYTES NFR BLD: 15 % (ref 12–49)
MCH RBC QN AUTO: 26.8 PG (ref 26–34)
MCHC RBC AUTO-ENTMCNC: 31.7 G/DL (ref 30–36.5)
MCV RBC AUTO: 84.5 FL (ref 80–99)
MONOCYTES # BLD: 0.5 K/UL (ref 0–1)
MONOCYTES NFR BLD: 9 % (ref 5–13)
NEUTS SEG # BLD: 4.6 K/UL (ref 1.8–8)
NEUTS SEG NFR BLD: 74 % (ref 32–75)
NRBC # BLD: 0 K/UL (ref 0–0.01)
NRBC BLD-RTO: 0 PER 100 WBC
PLATELET # BLD AUTO: 302 K/UL (ref 150–400)
PMV BLD AUTO: 10.1 FL (ref 8.9–12.9)
POTASSIUM SERPL-SCNC: 2.8 MMOL/L (ref 3.5–5.1)
RBC # BLD AUTO: 3.81 M/UL (ref 3.8–5.2)
SERVICE CMNT-IMP: ABNORMAL
SERVICE CMNT-IMP: NORMAL
SERVICE CMNT-IMP: NORMAL
SODIUM SERPL-SCNC: 137 MMOL/L (ref 136–145)
WBC # BLD AUTO: 6.1 K/UL (ref 3.6–11)

## 2024-10-25 PROCEDURE — 2580000003 HC RX 258: Performed by: INTERNAL MEDICINE

## 2024-10-25 PROCEDURE — 6370000000 HC RX 637 (ALT 250 FOR IP): Performed by: ORTHOPAEDIC SURGERY

## 2024-10-25 PROCEDURE — 2580000003 HC RX 258: Performed by: ORTHOPAEDIC SURGERY

## 2024-10-25 PROCEDURE — 82962 GLUCOSE BLOOD TEST: CPT

## 2024-10-25 PROCEDURE — 6370000000 HC RX 637 (ALT 250 FOR IP): Performed by: INTERNAL MEDICINE

## 2024-10-25 PROCEDURE — 94640 AIRWAY INHALATION TREATMENT: CPT

## 2024-10-25 PROCEDURE — 76937 US GUIDE VASCULAR ACCESS: CPT

## 2024-10-25 PROCEDURE — 1100000003 HC PRIVATE W/ TELEMETRY

## 2024-10-25 PROCEDURE — 80048 BASIC METABOLIC PNL TOTAL CA: CPT

## 2024-10-25 PROCEDURE — 36415 COLL VENOUS BLD VENIPUNCTURE: CPT

## 2024-10-25 PROCEDURE — APPNB30 APP NON BILLABLE TIME 0-30 MINS: Performed by: ORTHOPAEDIC SURGERY

## 2024-10-25 PROCEDURE — 85025 COMPLETE CBC W/AUTO DIFF WBC: CPT

## 2024-10-25 PROCEDURE — 85652 RBC SED RATE AUTOMATED: CPT

## 2024-10-25 PROCEDURE — 6360000002 HC RX W HCPCS: Performed by: INTERNAL MEDICINE

## 2024-10-25 PROCEDURE — 2500000003 HC RX 250 WO HCPCS: Performed by: INTERNAL MEDICINE

## 2024-10-25 PROCEDURE — 94761 N-INVAS EAR/PLS OXIMETRY MLT: CPT

## 2024-10-25 PROCEDURE — 86140 C-REACTIVE PROTEIN: CPT

## 2024-10-25 PROCEDURE — 6360000002 HC RX W HCPCS: Performed by: ORTHOPAEDIC SURGERY

## 2024-10-25 PROCEDURE — 76770 US EXAM ABDO BACK WALL COMP: CPT

## 2024-10-25 RX ORDER — POLYETHYLENE GLYCOL 3350 17 G/17G
17 POWDER, FOR SOLUTION ORAL DAILY
Status: DISCONTINUED | OUTPATIENT
Start: 2024-10-25 | End: 2024-10-30 | Stop reason: HOSPADM

## 2024-10-25 RX ORDER — SODIUM CHLORIDE 9 MG/ML
INJECTION, SOLUTION INTRAVENOUS CONTINUOUS
Status: ACTIVE | OUTPATIENT
Start: 2024-10-25 | End: 2024-10-25

## 2024-10-25 RX ORDER — SODIUM CHLORIDE 9 MG/ML
INJECTION, SOLUTION INTRAVENOUS CONTINUOUS
Status: DISCONTINUED | OUTPATIENT
Start: 2024-10-25 | End: 2024-10-25

## 2024-10-25 RX ADMIN — SODIUM CHLORIDE: 9 INJECTION, SOLUTION INTRAVENOUS at 12:34

## 2024-10-25 RX ADMIN — MICONAZOLE NITRATE: 20 CREAM TOPICAL at 17:55

## 2024-10-25 RX ADMIN — EMPAGLIFLOZIN 10 MG: 10 TABLET, FILM COATED ORAL at 10:27

## 2024-10-25 RX ADMIN — ARFORMOTEROL TARTRATE: 15 SOLUTION RESPIRATORY (INHALATION) at 08:26

## 2024-10-25 RX ADMIN — BUMETANIDE 2 MG: 1 TABLET ORAL at 10:27

## 2024-10-25 RX ADMIN — OXYCODONE 5 MG: 5 TABLET ORAL at 17:57

## 2024-10-25 RX ADMIN — FERROUS SULFATE TAB 325 MG (65 MG ELEMENTAL FE) 325 MG: 325 (65 FE) TAB at 10:27

## 2024-10-25 RX ADMIN — PANTOPRAZOLE SODIUM 40 MG: 40 TABLET, DELAYED RELEASE ORAL at 06:19

## 2024-10-25 RX ADMIN — DOXYCYCLINE 100 MG: 100 INJECTION, POWDER, LYOPHILIZED, FOR SOLUTION INTRAVENOUS at 23:43

## 2024-10-25 RX ADMIN — ARFORMOTEROL TARTRATE: 15 SOLUTION RESPIRATORY (INHALATION) at 19:16

## 2024-10-25 RX ADMIN — MICONAZOLE NITRATE: 20 CREAM TOPICAL at 21:00

## 2024-10-25 RX ADMIN — POTASSIUM BICARBONATE 40 MEQ: 782 TABLET, EFFERVESCENT ORAL at 10:29

## 2024-10-25 RX ADMIN — CARVEDILOL 25 MG: 12.5 TABLET, FILM COATED ORAL at 17:56

## 2024-10-25 RX ADMIN — POLYETHYLENE GLYCOL 3350 17 G: 17 POWDER, FOR SOLUTION ORAL at 10:28

## 2024-10-25 RX ADMIN — CEFEPIME 2000 MG: 2 INJECTION, POWDER, FOR SOLUTION INTRAVENOUS at 18:05

## 2024-10-25 RX ADMIN — SODIUM CHLORIDE, PRESERVATIVE FREE 10 ML: 5 INJECTION INTRAVENOUS at 21:01

## 2024-10-25 RX ADMIN — Medication: at 09:00

## 2024-10-25 RX ADMIN — PREGABALIN 75 MG: 75 CAPSULE ORAL at 10:27

## 2024-10-25 RX ADMIN — CARVEDILOL 25 MG: 12.5 TABLET, FILM COATED ORAL at 10:27

## 2024-10-25 RX ADMIN — ENOXAPARIN SODIUM 30 MG: 100 INJECTION SUBCUTANEOUS at 21:01

## 2024-10-25 RX ADMIN — OXYCODONE 5 MG: 5 TABLET ORAL at 13:18

## 2024-10-25 RX ADMIN — ISOSORBIDE MONONITRATE 60 MG: 30 TABLET, EXTENDED RELEASE ORAL at 10:28

## 2024-10-25 RX ADMIN — Medication: at 21:01

## 2024-10-25 RX ADMIN — Medication 3 MG: at 21:02

## 2024-10-25 RX ADMIN — DOXYCYCLINE 100 MG: 100 INJECTION, POWDER, LYOPHILIZED, FOR SOLUTION INTRAVENOUS at 12:37

## 2024-10-25 ASSESSMENT — PAIN DESCRIPTION - LOCATION
LOCATION: FOOT
LOCATION: ANKLE

## 2024-10-25 ASSESSMENT — PAIN DESCRIPTION - ORIENTATION
ORIENTATION: LEFT
ORIENTATION: RIGHT;LEFT

## 2024-10-25 ASSESSMENT — PAIN SCALES - GENERAL
PAINLEVEL_OUTOF10: 7
PAINLEVEL_OUTOF10: 10
PAINLEVEL_OUTOF10: 6

## 2024-10-25 ASSESSMENT — PAIN - FUNCTIONAL ASSESSMENT: PAIN_FUNCTIONAL_ASSESSMENT: PREVENTS OR INTERFERES SOME ACTIVE ACTIVITIES AND ADLS

## 2024-10-25 ASSESSMENT — PAIN DESCRIPTION - DESCRIPTORS: DESCRIPTORS: ACHING

## 2024-10-25 NOTE — WOUND CARE
Wound Care consult for the skin on her bottom:  Chart reviewed and patient assessed.    Assessment: Her bottom / buttocks skin is intact and does have some mild discolorations that are normal for her - mild hyperpigmented skin.   Pt. Is incontinent so may need some skin protection from any soiling. Can use zinc oxide cream in a thin layer. The purewick pressure was decreased to 60 mmHg.   Plan: Pt. Has decreased mobility so we need to make sure that she gets turned significantly.  She is cooperative and understands the skin care routine.   The heels are red but completely blanchable. The left leg surgical dressing is intact with the ACE and there is visible drainage that looks dry on the ACE.     Note: the wound care nurse was not consulted for the ankle / leg due to surgery. There are instructions to call the PA for break-through drainage.   Verónica Collins RN, BSN, CWON

## 2024-10-25 NOTE — CONSULTS
Infectious Disease Consult    Date of Consultation:  October 24, 2024  Reason for Consultation: Management of deep infection/talus, calcaneus and tibia left lower extremity s/p removal all hardware   Referring Physician: Dr Chan  Date of Admission:10/21/2024      Impression    Fever Tmax 102.7    Left lower extremity cellulitis  Acute OM involving tibia, talus, calcaneus s/p arthrodesis, ORIF & removal of hardware  S/p LLE retrograde intramedullary nail and screws removed, deep bone debridement and incision  Irrigation, debridement involving lateral talus, posterior calcaneum, tibia 10/23  Multiple cultures taken.  Initial culture+ for scant probable Staph aureus.  Final ID/LILIANA pending.  Blood cultures 10/21-no growth.    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  Adequate blood sugar control.      I will be away ,returning on 10/28  Please call ID on-call with questions, concerns    Abx  Vancomycin-10/21  Cefepime 10/21    Extensive review of chart notes, labs, imaging, cultures done  Additionally review of done: Recent reports-Labs, cultures, imaging  D/w -hospitalist, RN  Patient is seen for management of deep infection-S/p removal of all hardware.  Marilee Oakes is a 74 y.o. female with past medical history A-fib on Eliquis, CAD, CHF, ESRD on dialysis,, DM, hypertension, COPD on 2 L at baseline, 
Orthopedic consult note:    74-year-old female presenting the hospital for left lower extremity cellulitis and chronic wounds of left ankle.  Patient has had several surgeries to the left ankle over the last year.  Patient most recently underwent tibial nailing and arthrodesis by Dr. Maame Chan over 3 months ago.  Patient was seen in the office on 10/8 by Dr. Chan.  Patient states she started to develop some redness on her ankle on Saturday.  She was seen at physical therapy yesterday where they recommended she present to the emergency department.  Patient was subsequently admitted.  Per the patient and her  they state that she started draining from her wounds yesterday.  She has been receiving [facility] wound care at home for this.  She states she has only been ambulating in her boot.  She denies any new pain.  She denies any other complaints at this time.    Exam: Inspection of the left ankle reveals diffuse mild erythema.  Patient has multiple nonhealing wounds over the area of the lateral and medial malleolus.  There is serous drainage noted at the lateral wound of the ankle.  The medial wound appears to have some slightly purulent discharge which is worsened with palpation..  Patient is unable to perform ankle range of motion secondary to arthrodesis.  Patient is able to move all digits.  Sensation to light touch intact.    Imaging:EXAM: XR ANKLE LEFT (2 VIEWS), XR FOOT LEFT (2 VIEWS)     INDICATION: cellulitis, eval for osteo.     COMPARISON: None.     FINDINGS: Two views of the left ankle and 2 views of the left foot demonstrate  no definite fracture or disruption of the ankle mortise. However, there is  severe generalized osteopenia and degenerative change. Hardware appears intact  in the distal tibia with tibiotalar fusion and a metallic screw transfixing the  calcaneus. There is no definite loosening or infection. Soft tissue swelling is  diffuse around the ankle and hindfoot, however. There is no 
the left ankle and 2 views of the left foot demonstrate  no definite fracture or disruption of the ankle mortise. However, there is  severe generalized osteopenia and degenerative change. Hardware appears intact  in the distal tibia with tibiotalar fusion and a metallic screw transfixing the  calcaneus. There is no definite loosening or infection. Soft tissue swelling is  diffuse around the ankle and hindfoot, however. There is no other acute osseous  or articular abnormality. The soft tissues are within normal limits.  Impression: No acute bony abnormality is confirmed. However, the sensitivity of  this study to early acute osteomyelitis may be diminished due to severe  degenerative change and osteopenia.    Electronically signed by CARA CORONEL  XR FOOT LEFT (2 VIEWS)  Narrative: EXAM: XR ANKLE LEFT (2 VIEWS), XR FOOT LEFT (2 VIEWS)    INDICATION: cellulitis, eval for osteo.    COMPARISON: None.    FINDINGS: Two views of the left ankle and 2 views of the left foot demonstrate  no definite fracture or disruption of the ankle mortise. However, there is  severe generalized osteopenia and degenerative change. Hardware appears intact  in the distal tibia with tibiotalar fusion and a metallic screw transfixing the  calcaneus. There is no definite loosening or infection. Soft tissue swelling is  diffuse around the ankle and hindfoot, however. There is no other acute osseous  or articular abnormality. The soft tissues are within normal limits.  Impression: No acute bony abnormality is confirmed. However, the sensitivity of  this study to early acute osteomyelitis may be diminished due to severe  degenerative change and osteopenia.    Electronically signed by CARA CORONEL  XR CHEST PORTABLE  Narrative: INDICATION: Sepsis    Portable AP view of the chest.    Direct comparison made to prior chest x-ray dated August 2024    Cardiomediastinal silhouette is stable. The lungs are hypoinflated. There is  mild left

## 2024-10-26 LAB
ALBUMIN SERPL-MCNC: 2.7 G/DL (ref 3.5–5)
ANION GAP SERPL CALC-SCNC: 4 MMOL/L (ref 2–12)
APPEARANCE UR: CLEAR
BACTERIA SPEC CULT: NORMAL
BACTERIA SPEC CULT: NORMAL
BACTERIA URNS QL MICRO: NEGATIVE /HPF
BASOPHILS # BLD: 0 K/UL (ref 0–0.1)
BASOPHILS NFR BLD: 0 % (ref 0–1)
BILIRUB UR QL: NEGATIVE
BUN SERPL-MCNC: 34 MG/DL (ref 6–20)
BUN/CREAT SERPL: 20 (ref 12–20)
CALCIUM SERPL-MCNC: 9.1 MG/DL (ref 8.5–10.1)
CHLORIDE SERPL-SCNC: 102 MMOL/L (ref 97–108)
CO2 SERPL-SCNC: 32 MMOL/L (ref 21–32)
COLOR UR: ABNORMAL
CREAT SERPL-MCNC: 1.74 MG/DL (ref 0.55–1.02)
CREAT UR-MCNC: 52.5 MG/DL
DIFFERENTIAL METHOD BLD: ABNORMAL
EOSINOPHIL # BLD: 0.1 K/UL (ref 0–0.4)
EOSINOPHIL NFR BLD: 1 % (ref 0–7)
EPITH CASTS URNS QL MICRO: ABNORMAL /LPF
ERYTHROCYTE [DISTWIDTH] IN BLOOD BY AUTOMATED COUNT: 16.5 % (ref 11.5–14.5)
GLUCOSE SERPL-MCNC: 202 MG/DL (ref 65–100)
GLUCOSE UR STRIP.AUTO-MCNC: 500 MG/DL
HCT VFR BLD AUTO: 27.9 % (ref 35–47)
HGB BLD-MCNC: 8.8 G/DL (ref 11.5–16)
HGB UR QL STRIP: NEGATIVE
IMM GRANULOCYTES # BLD AUTO: 0.1 K/UL (ref 0–0.04)
IMM GRANULOCYTES NFR BLD AUTO: 1 % (ref 0–0.5)
KETONES UR QL STRIP.AUTO: NEGATIVE MG/DL
LEUKOCYTE ESTERASE UR QL STRIP.AUTO: NEGATIVE
LYMPHOCYTES # BLD: 0.8 K/UL (ref 0.8–3.5)
LYMPHOCYTES NFR BLD: 13 % (ref 12–49)
MCH RBC QN AUTO: 26.7 PG (ref 26–34)
MCHC RBC AUTO-ENTMCNC: 31.5 G/DL (ref 30–36.5)
MCV RBC AUTO: 84.5 FL (ref 80–99)
MONOCYTES # BLD: 0.7 K/UL (ref 0–1)
MONOCYTES NFR BLD: 11 % (ref 5–13)
NEUTS SEG # BLD: 4.6 K/UL (ref 1.8–8)
NEUTS SEG NFR BLD: 74 % (ref 32–75)
NITRITE UR QL STRIP.AUTO: NEGATIVE
NRBC # BLD: 0 K/UL (ref 0–0.01)
NRBC BLD-RTO: 0 PER 100 WBC
PH UR STRIP: 6 (ref 5–8)
PHOSPHATE SERPL-MCNC: 2.7 MG/DL (ref 2.6–4.7)
PLATELET # BLD AUTO: 271 K/UL (ref 150–400)
PMV BLD AUTO: 10 FL (ref 8.9–12.9)
POTASSIUM SERPL-SCNC: 3 MMOL/L (ref 3.5–5.1)
PROT UR STRIP-MCNC: 30 MG/DL
RBC # BLD AUTO: 3.3 M/UL (ref 3.8–5.2)
RBC #/AREA URNS HPF: ABNORMAL /HPF (ref 0–5)
SERVICE CMNT-IMP: NORMAL
SERVICE CMNT-IMP: NORMAL
SODIUM SERPL-SCNC: 138 MMOL/L (ref 136–145)
SODIUM UR-SCNC: 34 MMOL/L
SP GR UR REFRACTOMETRY: 1.02
UROBILINOGEN UR QL STRIP.AUTO: 0.2 EU/DL (ref 0.2–1)
WBC # BLD AUTO: 6.3 K/UL (ref 3.6–11)
WBC URNS QL MICRO: ABNORMAL /HPF (ref 0–4)
YEAST BUDDING URNS QL: PRESENT

## 2024-10-26 PROCEDURE — 6370000000 HC RX 637 (ALT 250 FOR IP): Performed by: ORTHOPAEDIC SURGERY

## 2024-10-26 PROCEDURE — 81001 URINALYSIS AUTO W/SCOPE: CPT

## 2024-10-26 PROCEDURE — 2500000003 HC RX 250 WO HCPCS: Performed by: INTERNAL MEDICINE

## 2024-10-26 PROCEDURE — APPNB30 APP NON BILLABLE TIME 0-30 MINS: Performed by: ORTHOPAEDIC SURGERY

## 2024-10-26 PROCEDURE — 94761 N-INVAS EAR/PLS OXIMETRY MLT: CPT

## 2024-10-26 PROCEDURE — 84300 ASSAY OF URINE SODIUM: CPT

## 2024-10-26 PROCEDURE — 6370000000 HC RX 637 (ALT 250 FOR IP): Performed by: INTERNAL MEDICINE

## 2024-10-26 PROCEDURE — 6360000002 HC RX W HCPCS: Performed by: INTERNAL MEDICINE

## 2024-10-26 PROCEDURE — 97530 THERAPEUTIC ACTIVITIES: CPT | Performed by: PHYSICAL THERAPIST

## 2024-10-26 PROCEDURE — 1100000003 HC PRIVATE W/ TELEMETRY

## 2024-10-26 PROCEDURE — 85025 COMPLETE CBC W/AUTO DIFF WBC: CPT

## 2024-10-26 PROCEDURE — 2580000003 HC RX 258: Performed by: ORTHOPAEDIC SURGERY

## 2024-10-26 PROCEDURE — P9047 ALBUMIN (HUMAN), 25%, 50ML: HCPCS | Performed by: INTERNAL MEDICINE

## 2024-10-26 PROCEDURE — 2580000003 HC RX 258: Performed by: INTERNAL MEDICINE

## 2024-10-26 PROCEDURE — 80069 RENAL FUNCTION PANEL: CPT

## 2024-10-26 PROCEDURE — 36415 COLL VENOUS BLD VENIPUNCTURE: CPT

## 2024-10-26 PROCEDURE — 82570 ASSAY OF URINE CREATININE: CPT

## 2024-10-26 RX ORDER — SENNA AND DOCUSATE SODIUM 50; 8.6 MG/1; MG/1
2 TABLET, FILM COATED ORAL DAILY
Status: DISCONTINUED | OUTPATIENT
Start: 2024-10-26 | End: 2024-10-30 | Stop reason: HOSPADM

## 2024-10-26 RX ORDER — POTASSIUM CHLORIDE 1500 MG/1
40 TABLET, EXTENDED RELEASE ORAL EVERY 4 HOURS
Status: COMPLETED | OUTPATIENT
Start: 2024-10-26 | End: 2024-10-26

## 2024-10-26 RX ORDER — LINEZOLID 2 MG/ML
600 INJECTION, SOLUTION INTRAVENOUS EVERY 12 HOURS
Status: DISCONTINUED | OUTPATIENT
Start: 2024-10-26 | End: 2024-10-27

## 2024-10-26 RX ORDER — ALBUMIN (HUMAN) 12.5 G/50ML
25 SOLUTION INTRAVENOUS EVERY 6 HOURS
Status: COMPLETED | OUTPATIENT
Start: 2024-10-26 | End: 2024-10-26

## 2024-10-26 RX ADMIN — POLYETHYLENE GLYCOL 3350 17 G: 17 POWDER, FOR SOLUTION ORAL at 10:20

## 2024-10-26 RX ADMIN — MICONAZOLE NITRATE: 20 CREAM TOPICAL at 20:54

## 2024-10-26 RX ADMIN — ENOXAPARIN SODIUM 30 MG: 100 INJECTION SUBCUTANEOUS at 20:54

## 2024-10-26 RX ADMIN — Medication: at 20:54

## 2024-10-26 RX ADMIN — ALBUMIN (HUMAN) 25 G: 0.25 INJECTION, SOLUTION INTRAVENOUS at 14:43

## 2024-10-26 RX ADMIN — ALBUMIN (HUMAN) 25 G: 0.25 INJECTION, SOLUTION INTRAVENOUS at 10:35

## 2024-10-26 RX ADMIN — LINEZOLID 600 MG: 600 INJECTION, SOLUTION INTRAVENOUS at 21:02

## 2024-10-26 RX ADMIN — SENNOSIDES AND DOCUSATE SODIUM 2 TABLET: 50; 8.6 TABLET ORAL at 14:40

## 2024-10-26 RX ADMIN — PREGABALIN 75 MG: 75 CAPSULE ORAL at 10:21

## 2024-10-26 RX ADMIN — OXYCODONE 5 MG: 5 TABLET ORAL at 21:05

## 2024-10-26 RX ADMIN — Medication: at 10:41

## 2024-10-26 RX ADMIN — POTASSIUM CHLORIDE 40 MEQ: 1500 TABLET, EXTENDED RELEASE ORAL at 14:40

## 2024-10-26 RX ADMIN — DOXYCYCLINE 100 MG: 100 INJECTION, POWDER, LYOPHILIZED, FOR SOLUTION INTRAVENOUS at 10:28

## 2024-10-26 RX ADMIN — MICONAZOLE NITRATE: 20 CREAM TOPICAL at 10:41

## 2024-10-26 RX ADMIN — PANTOPRAZOLE SODIUM 40 MG: 40 TABLET, DELAYED RELEASE ORAL at 05:57

## 2024-10-26 RX ADMIN — CARVEDILOL 25 MG: 12.5 TABLET, FILM COATED ORAL at 10:21

## 2024-10-26 RX ADMIN — Medication 3 MG: at 20:54

## 2024-10-26 RX ADMIN — CARVEDILOL 25 MG: 12.5 TABLET, FILM COATED ORAL at 18:15

## 2024-10-26 RX ADMIN — ISOSORBIDE MONONITRATE 60 MG: 30 TABLET, EXTENDED RELEASE ORAL at 10:20

## 2024-10-26 RX ADMIN — SODIUM CHLORIDE, PRESERVATIVE FREE 10 ML: 5 INJECTION INTRAVENOUS at 20:55

## 2024-10-26 RX ADMIN — POTASSIUM CHLORIDE 40 MEQ: 1500 TABLET, EXTENDED RELEASE ORAL at 10:21

## 2024-10-26 RX ADMIN — LORAZEPAM 0.5 MG: 0.5 TABLET ORAL at 00:35

## 2024-10-26 RX ADMIN — OXYCODONE 2.5 MG: 5 TABLET ORAL at 10:51

## 2024-10-26 RX ADMIN — CEFEPIME 2000 MG: 2 INJECTION, POWDER, FOR SOLUTION INTRAVENOUS at 18:18

## 2024-10-26 RX ADMIN — SODIUM CHLORIDE, PRESERVATIVE FREE 10 ML: 5 INJECTION INTRAVENOUS at 10:40

## 2024-10-26 RX ADMIN — ENOXAPARIN SODIUM 30 MG: 100 INJECTION SUBCUTANEOUS at 10:21

## 2024-10-26 ASSESSMENT — PAIN SCALES - GENERAL
PAINLEVEL_OUTOF10: 0
PAINLEVEL_OUTOF10: 8
PAINLEVEL_OUTOF10: 5

## 2024-10-26 ASSESSMENT — PAIN DESCRIPTION - DESCRIPTORS
DESCRIPTORS: ACHING;THROBBING
DESCRIPTORS: ACHING

## 2024-10-26 ASSESSMENT — PAIN DESCRIPTION - ORIENTATION
ORIENTATION: LEFT
ORIENTATION: LEFT

## 2024-10-26 ASSESSMENT — PAIN DESCRIPTION - LOCATION
LOCATION: LEG
LOCATION: ANKLE

## 2024-10-26 ASSESSMENT — PAIN DESCRIPTION - ONSET: ONSET: GRADUAL

## 2024-10-26 ASSESSMENT — PAIN DESCRIPTION - PAIN TYPE: TYPE: ACUTE PAIN

## 2024-10-26 ASSESSMENT — PAIN DESCRIPTION - FREQUENCY: FREQUENCY: INTERMITTENT

## 2024-10-27 LAB
ALBUMIN SERPL-MCNC: 3.4 G/DL (ref 3.5–5)
ANION GAP SERPL CALC-SCNC: 2 MMOL/L (ref 2–12)
BUN SERPL-MCNC: 29 MG/DL (ref 6–20)
BUN/CREAT SERPL: 18 (ref 12–20)
CALCIUM SERPL-MCNC: 10 MG/DL (ref 8.5–10.1)
CHLORIDE SERPL-SCNC: 103 MMOL/L (ref 97–108)
CO2 SERPL-SCNC: 34 MMOL/L (ref 21–32)
CREAT SERPL-MCNC: 1.64 MG/DL (ref 0.55–1.02)
GLUCOSE BLD STRIP.AUTO-MCNC: 160 MG/DL (ref 65–117)
GLUCOSE SERPL-MCNC: 179 MG/DL (ref 65–100)
MAGNESIUM SERPL-MCNC: 2 MG/DL (ref 1.6–2.4)
PHOSPHATE SERPL-MCNC: 2.5 MG/DL (ref 2.6–4.7)
POTASSIUM SERPL-SCNC: 3.7 MMOL/L (ref 3.5–5.1)
SERVICE CMNT-IMP: ABNORMAL
SODIUM SERPL-SCNC: 139 MMOL/L (ref 136–145)

## 2024-10-27 PROCEDURE — 84100 ASSAY OF PHOSPHORUS: CPT

## 2024-10-27 PROCEDURE — 6370000000 HC RX 637 (ALT 250 FOR IP): Performed by: ORTHOPAEDIC SURGERY

## 2024-10-27 PROCEDURE — 80048 BASIC METABOLIC PNL TOTAL CA: CPT

## 2024-10-27 PROCEDURE — 6360000002 HC RX W HCPCS: Performed by: ORTHOPAEDIC SURGERY

## 2024-10-27 PROCEDURE — 94669 MECHANICAL CHEST WALL OSCILL: CPT

## 2024-10-27 PROCEDURE — 6360000002 HC RX W HCPCS: Performed by: INTERNAL MEDICINE

## 2024-10-27 PROCEDURE — 83735 ASSAY OF MAGNESIUM: CPT

## 2024-10-27 PROCEDURE — 6370000000 HC RX 637 (ALT 250 FOR IP): Performed by: INTERNAL MEDICINE

## 2024-10-27 PROCEDURE — 2580000003 HC RX 258: Performed by: ORTHOPAEDIC SURGERY

## 2024-10-27 PROCEDURE — 82040 ASSAY OF SERUM ALBUMIN: CPT

## 2024-10-27 PROCEDURE — 1100000003 HC PRIVATE W/ TELEMETRY

## 2024-10-27 PROCEDURE — 36415 COLL VENOUS BLD VENIPUNCTURE: CPT

## 2024-10-27 PROCEDURE — 94640 AIRWAY INHALATION TREATMENT: CPT

## 2024-10-27 PROCEDURE — 94761 N-INVAS EAR/PLS OXIMETRY MLT: CPT

## 2024-10-27 PROCEDURE — 82962 GLUCOSE BLOOD TEST: CPT

## 2024-10-27 RX ORDER — LINEZOLID 600 MG/1
600 TABLET, FILM COATED ORAL EVERY 12 HOURS SCHEDULED
Status: DISCONTINUED | OUTPATIENT
Start: 2024-10-27 | End: 2024-10-30 | Stop reason: HOSPADM

## 2024-10-27 RX ADMIN — ENOXAPARIN SODIUM 30 MG: 100 INJECTION SUBCUTANEOUS at 21:33

## 2024-10-27 RX ADMIN — SENNOSIDES AND DOCUSATE SODIUM 2 TABLET: 50; 8.6 TABLET ORAL at 10:08

## 2024-10-27 RX ADMIN — ONDANSETRON 4 MG: 4 TABLET, ORALLY DISINTEGRATING ORAL at 07:04

## 2024-10-27 RX ADMIN — Medication 3 MG: at 21:33

## 2024-10-27 RX ADMIN — Medication: at 10:09

## 2024-10-27 RX ADMIN — ISOSORBIDE MONONITRATE 60 MG: 30 TABLET, EXTENDED RELEASE ORAL at 10:07

## 2024-10-27 RX ADMIN — SODIUM CHLORIDE, PRESERVATIVE FREE 10 ML: 5 INJECTION INTRAVENOUS at 21:38

## 2024-10-27 RX ADMIN — Medication: at 21:34

## 2024-10-27 RX ADMIN — CARVEDILOL 25 MG: 12.5 TABLET, FILM COATED ORAL at 17:56

## 2024-10-27 RX ADMIN — ARFORMOTEROL TARTRATE: 15 SOLUTION RESPIRATORY (INHALATION) at 08:59

## 2024-10-27 RX ADMIN — SODIUM CHLORIDE, PRESERVATIVE FREE 10 ML: 5 INJECTION INTRAVENOUS at 21:35

## 2024-10-27 RX ADMIN — ARFORMOTEROL TARTRATE: 15 SOLUTION RESPIRATORY (INHALATION) at 19:11

## 2024-10-27 RX ADMIN — LINEZOLID 600 MG: 600 TABLET, FILM COATED ORAL at 17:57

## 2024-10-27 RX ADMIN — MICONAZOLE NITRATE: 20 CREAM TOPICAL at 10:08

## 2024-10-27 RX ADMIN — ENOXAPARIN SODIUM 30 MG: 100 INJECTION SUBCUTANEOUS at 10:08

## 2024-10-27 RX ADMIN — MICONAZOLE NITRATE: 20 CREAM TOPICAL at 21:34

## 2024-10-27 RX ADMIN — PREGABALIN 75 MG: 75 CAPSULE ORAL at 10:07

## 2024-10-27 RX ADMIN — CARVEDILOL 25 MG: 12.5 TABLET, FILM COATED ORAL at 10:08

## 2024-10-27 RX ADMIN — LINEZOLID 600 MG: 600 INJECTION, SOLUTION INTRAVENOUS at 06:13

## 2024-10-27 RX ADMIN — POLYETHYLENE GLYCOL 3350 17 G: 17 POWDER, FOR SOLUTION ORAL at 10:07

## 2024-10-27 RX ADMIN — SODIUM CHLORIDE, PRESERVATIVE FREE 10 ML: 5 INJECTION INTRAVENOUS at 10:09

## 2024-10-27 RX ADMIN — PANTOPRAZOLE SODIUM 40 MG: 40 TABLET, DELAYED RELEASE ORAL at 06:13

## 2024-10-27 ASSESSMENT — PAIN DESCRIPTION - PAIN TYPE: TYPE: ACUTE PAIN

## 2024-10-27 ASSESSMENT — PAIN SCALES - GENERAL
PAINLEVEL_OUTOF10: 0
PAINLEVEL_OUTOF10: 1

## 2024-10-27 ASSESSMENT — PAIN DESCRIPTION - FREQUENCY: FREQUENCY: INTERMITTENT

## 2024-10-27 ASSESSMENT — PAIN DESCRIPTION - LOCATION: LOCATION: LEG

## 2024-10-27 ASSESSMENT — PAIN DESCRIPTION - ORIENTATION: ORIENTATION: LEFT

## 2024-10-27 ASSESSMENT — PAIN - FUNCTIONAL ASSESSMENT: PAIN_FUNCTIONAL_ASSESSMENT: PREVENTS OR INTERFERES SOME ACTIVE ACTIVITIES AND ADLS

## 2024-10-27 ASSESSMENT — PAIN DESCRIPTION - DESCRIPTORS: DESCRIPTORS: ACHING

## 2024-10-27 ASSESSMENT — PAIN DESCRIPTION - ONSET: ONSET: GRADUAL

## 2024-10-28 LAB
ALBUMIN SERPL-MCNC: 2.9 G/DL (ref 3.5–5)
ANION GAP SERPL CALC-SCNC: 4 MMOL/L (ref 2–12)
BACTERIA SPEC CULT: ABNORMAL
BACTERIA SPEC CULT: NORMAL
BACTERIA SPEC CULT: NORMAL
BUN SERPL-MCNC: 29 MG/DL (ref 6–20)
BUN/CREAT SERPL: 15 (ref 12–20)
CALCIUM SERPL-MCNC: 9.6 MG/DL (ref 8.5–10.1)
CHLORIDE SERPL-SCNC: 105 MMOL/L (ref 97–108)
CO2 SERPL-SCNC: 29 MMOL/L (ref 21–32)
CREAT SERPL-MCNC: 1.9 MG/DL (ref 0.55–1.02)
GLUCOSE SERPL-MCNC: 201 MG/DL (ref 65–100)
GRAM STN SPEC: ABNORMAL
GRAM STN SPEC: ABNORMAL
PHOSPHATE SERPL-MCNC: 3.2 MG/DL (ref 2.6–4.7)
POTASSIUM SERPL-SCNC: 3.9 MMOL/L (ref 3.5–5.1)
SERVICE CMNT-IMP: ABNORMAL
SERVICE CMNT-IMP: NORMAL
SERVICE CMNT-IMP: NORMAL
SODIUM SERPL-SCNC: 138 MMOL/L (ref 136–145)

## 2024-10-28 PROCEDURE — 6370000000 HC RX 637 (ALT 250 FOR IP): Performed by: INTERNAL MEDICINE

## 2024-10-28 PROCEDURE — 6370000000 HC RX 637 (ALT 250 FOR IP): Performed by: ORTHOPAEDIC SURGERY

## 2024-10-28 PROCEDURE — 2580000003 HC RX 258: Performed by: ORTHOPAEDIC SURGERY

## 2024-10-28 PROCEDURE — 1100000003 HC PRIVATE W/ TELEMETRY

## 2024-10-28 PROCEDURE — 97530 THERAPEUTIC ACTIVITIES: CPT

## 2024-10-28 PROCEDURE — 80069 RENAL FUNCTION PANEL: CPT

## 2024-10-28 PROCEDURE — 6360000002 HC RX W HCPCS: Performed by: INTERNAL MEDICINE

## 2024-10-28 PROCEDURE — 99233 SBSQ HOSP IP/OBS HIGH 50: CPT | Performed by: INTERNAL MEDICINE

## 2024-10-28 PROCEDURE — 94761 N-INVAS EAR/PLS OXIMETRY MLT: CPT

## 2024-10-28 PROCEDURE — 6360000002 HC RX W HCPCS: Performed by: ORTHOPAEDIC SURGERY

## 2024-10-28 PROCEDURE — 36415 COLL VENOUS BLD VENIPUNCTURE: CPT

## 2024-10-28 PROCEDURE — 94640 AIRWAY INHALATION TREATMENT: CPT

## 2024-10-28 RX ORDER — LACTOBACILLUS RHAMNOSUS GG 10B CELL
1 CAPSULE ORAL
Status: DISCONTINUED | OUTPATIENT
Start: 2024-10-29 | End: 2024-10-30 | Stop reason: HOSPADM

## 2024-10-28 RX ORDER — BUMETANIDE 1 MG/1
1 TABLET ORAL 2 TIMES DAILY
Status: DISCONTINUED | OUTPATIENT
Start: 2024-10-28 | End: 2024-10-29

## 2024-10-28 RX ADMIN — ACETAMINOPHEN 650 MG: 325 TABLET ORAL at 21:17

## 2024-10-28 RX ADMIN — Medication 3 MG: at 21:17

## 2024-10-28 RX ADMIN — ENOXAPARIN SODIUM 30 MG: 100 INJECTION SUBCUTANEOUS at 21:16

## 2024-10-28 RX ADMIN — ISOSORBIDE MONONITRATE 60 MG: 30 TABLET, EXTENDED RELEASE ORAL at 09:13

## 2024-10-28 RX ADMIN — SODIUM CHLORIDE, PRESERVATIVE FREE 10 ML: 5 INJECTION INTRAVENOUS at 09:22

## 2024-10-28 RX ADMIN — ENOXAPARIN SODIUM 30 MG: 100 INJECTION SUBCUTANEOUS at 09:13

## 2024-10-28 RX ADMIN — Medication: at 09:17

## 2024-10-28 RX ADMIN — SODIUM CHLORIDE, PRESERVATIVE FREE 10 ML: 5 INJECTION INTRAVENOUS at 21:18

## 2024-10-28 RX ADMIN — ARFORMOTEROL TARTRATE: 15 SOLUTION RESPIRATORY (INHALATION) at 20:24

## 2024-10-28 RX ADMIN — LORAZEPAM 0.5 MG: 0.5 TABLET ORAL at 00:41

## 2024-10-28 RX ADMIN — Medication: at 21:18

## 2024-10-28 RX ADMIN — ACETAMINOPHEN 650 MG: 325 TABLET ORAL at 00:41

## 2024-10-28 RX ADMIN — CARVEDILOL 25 MG: 12.5 TABLET, FILM COATED ORAL at 09:12

## 2024-10-28 RX ADMIN — PANTOPRAZOLE SODIUM 40 MG: 40 TABLET, DELAYED RELEASE ORAL at 06:44

## 2024-10-28 RX ADMIN — BUMETANIDE 1 MG: 1 TABLET ORAL at 18:21

## 2024-10-28 RX ADMIN — CARVEDILOL 25 MG: 12.5 TABLET, FILM COATED ORAL at 18:21

## 2024-10-28 RX ADMIN — LINEZOLID 600 MG: 600 TABLET, FILM COATED ORAL at 09:13

## 2024-10-28 RX ADMIN — POLYETHYLENE GLYCOL 3350 17 G: 17 POWDER, FOR SOLUTION ORAL at 09:13

## 2024-10-28 RX ADMIN — OXYCODONE 5 MG: 5 TABLET ORAL at 06:44

## 2024-10-28 RX ADMIN — BUMETANIDE 1 MG: 1 TABLET ORAL at 09:12

## 2024-10-28 RX ADMIN — LINEZOLID 600 MG: 600 TABLET, FILM COATED ORAL at 21:16

## 2024-10-28 RX ADMIN — MICONAZOLE NITRATE: 20 CREAM TOPICAL at 09:18

## 2024-10-28 RX ADMIN — ARFORMOTEROL TARTRATE: 15 SOLUTION RESPIRATORY (INHALATION) at 08:40

## 2024-10-28 RX ADMIN — SENNOSIDES AND DOCUSATE SODIUM 2 TABLET: 50; 8.6 TABLET ORAL at 09:13

## 2024-10-28 ASSESSMENT — PAIN SCALES - GENERAL
PAINLEVEL_OUTOF10: 0
PAINLEVEL_OUTOF10: 3
PAINLEVEL_OUTOF10: 6
PAINLEVEL_OUTOF10: 1

## 2024-10-28 ASSESSMENT — PAIN DESCRIPTION - LOCATION
LOCATION: LEG
LOCATION: BACK;LEG

## 2024-10-28 ASSESSMENT — PAIN DESCRIPTION - DESCRIPTORS
DESCRIPTORS: ACHING
DESCRIPTORS: ACHING

## 2024-10-28 ASSESSMENT — PAIN DESCRIPTION - ORIENTATION
ORIENTATION: LEFT
ORIENTATION: LEFT

## 2024-10-29 LAB
ANION GAP SERPL CALC-SCNC: 7 MMOL/L (ref 2–12)
BASOPHILS # BLD: 0 K/UL (ref 0–0.1)
BASOPHILS NFR BLD: 0 % (ref 0–1)
BUN SERPL-MCNC: 25 MG/DL (ref 6–20)
BUN/CREAT SERPL: 13 (ref 12–20)
CALCIUM SERPL-MCNC: 9.5 MG/DL (ref 8.5–10.1)
CHLORIDE SERPL-SCNC: 104 MMOL/L (ref 97–108)
CO2 SERPL-SCNC: 28 MMOL/L (ref 21–32)
CREAT SERPL-MCNC: 1.88 MG/DL (ref 0.55–1.02)
DIFFERENTIAL METHOD BLD: ABNORMAL
EOSINOPHIL # BLD: 0.2 K/UL (ref 0–0.4)
EOSINOPHIL NFR BLD: 4 % (ref 0–7)
ERYTHROCYTE [DISTWIDTH] IN BLOOD BY AUTOMATED COUNT: 16.6 % (ref 11.5–14.5)
GLUCOSE SERPL-MCNC: 224 MG/DL (ref 65–100)
HCT VFR BLD AUTO: 34.4 % (ref 35–47)
HGB BLD-MCNC: 10.5 G/DL (ref 11.5–16)
IMM GRANULOCYTES # BLD AUTO: 0.1 K/UL (ref 0–0.04)
IMM GRANULOCYTES NFR BLD AUTO: 1 % (ref 0–0.5)
LYMPHOCYTES # BLD: 0.8 K/UL (ref 0.8–3.5)
LYMPHOCYTES NFR BLD: 13 % (ref 12–49)
MCH RBC QN AUTO: 26.6 PG (ref 26–34)
MCHC RBC AUTO-ENTMCNC: 30.5 G/DL (ref 30–36.5)
MCV RBC AUTO: 87.1 FL (ref 80–99)
MONOCYTES # BLD: 0.4 K/UL (ref 0–1)
MONOCYTES NFR BLD: 6 % (ref 5–13)
NEUTS SEG # BLD: 4.7 K/UL (ref 1.8–8)
NEUTS SEG NFR BLD: 76 % (ref 32–75)
NRBC # BLD: 0 K/UL (ref 0–0.01)
NRBC BLD-RTO: 0 PER 100 WBC
PLATELET # BLD AUTO: 289 K/UL (ref 150–400)
PMV BLD AUTO: 10.2 FL (ref 8.9–12.9)
POTASSIUM SERPL-SCNC: 3.4 MMOL/L (ref 3.5–5.1)
RBC # BLD AUTO: 3.95 M/UL (ref 3.8–5.2)
SODIUM SERPL-SCNC: 139 MMOL/L (ref 136–145)
WBC # BLD AUTO: 6.2 K/UL (ref 3.6–11)

## 2024-10-29 PROCEDURE — 80048 BASIC METABOLIC PNL TOTAL CA: CPT

## 2024-10-29 PROCEDURE — 99233 SBSQ HOSP IP/OBS HIGH 50: CPT | Performed by: INTERNAL MEDICINE

## 2024-10-29 PROCEDURE — 85025 COMPLETE CBC W/AUTO DIFF WBC: CPT

## 2024-10-29 PROCEDURE — 6370000000 HC RX 637 (ALT 250 FOR IP): Performed by: INTERNAL MEDICINE

## 2024-10-29 PROCEDURE — 36415 COLL VENOUS BLD VENIPUNCTURE: CPT

## 2024-10-29 PROCEDURE — 97530 THERAPEUTIC ACTIVITIES: CPT

## 2024-10-29 PROCEDURE — 6360000002 HC RX W HCPCS: Performed by: ORTHOPAEDIC SURGERY

## 2024-10-29 PROCEDURE — 6370000000 HC RX 637 (ALT 250 FOR IP): Performed by: ORTHOPAEDIC SURGERY

## 2024-10-29 PROCEDURE — 2580000003 HC RX 258: Performed by: ORTHOPAEDIC SURGERY

## 2024-10-29 PROCEDURE — 1100000003 HC PRIVATE W/ TELEMETRY

## 2024-10-29 PROCEDURE — 97535 SELF CARE MNGMENT TRAINING: CPT

## 2024-10-29 PROCEDURE — 94640 AIRWAY INHALATION TREATMENT: CPT

## 2024-10-29 PROCEDURE — 6360000002 HC RX W HCPCS: Performed by: INTERNAL MEDICINE

## 2024-10-29 RX ORDER — POTASSIUM CHLORIDE 1500 MG/1
20 TABLET, EXTENDED RELEASE ORAL ONCE
Status: COMPLETED | OUTPATIENT
Start: 2024-10-29 | End: 2024-10-29

## 2024-10-29 RX ORDER — BUMETANIDE 1 MG/1
1 TABLET ORAL DAILY
Status: DISCONTINUED | OUTPATIENT
Start: 2024-10-29 | End: 2024-10-30 | Stop reason: HOSPADM

## 2024-10-29 RX ADMIN — ACETAMINOPHEN 650 MG: 325 TABLET ORAL at 13:26

## 2024-10-29 RX ADMIN — ARFORMOTEROL TARTRATE: 15 SOLUTION RESPIRATORY (INHALATION) at 20:39

## 2024-10-29 RX ADMIN — BUMETANIDE 1 MG: 1 TABLET ORAL at 08:56

## 2024-10-29 RX ADMIN — LORAZEPAM 0.5 MG: 0.5 TABLET ORAL at 21:06

## 2024-10-29 RX ADMIN — LINEZOLID 600 MG: 600 TABLET, FILM COATED ORAL at 21:06

## 2024-10-29 RX ADMIN — ARFORMOTEROL TARTRATE: 15 SOLUTION RESPIRATORY (INHALATION) at 07:36

## 2024-10-29 RX ADMIN — MICONAZOLE NITRATE: 20 CREAM TOPICAL at 00:28

## 2024-10-29 RX ADMIN — Medication 3 MG: at 21:06

## 2024-10-29 RX ADMIN — SODIUM CHLORIDE, PRESERVATIVE FREE 10 ML: 5 INJECTION INTRAVENOUS at 21:06

## 2024-10-29 RX ADMIN — MICONAZOLE NITRATE: 20 CREAM TOPICAL at 08:58

## 2024-10-29 RX ADMIN — CARVEDILOL 25 MG: 12.5 TABLET, FILM COATED ORAL at 08:56

## 2024-10-29 RX ADMIN — SODIUM CHLORIDE, PRESERVATIVE FREE 10 ML: 5 INJECTION INTRAVENOUS at 08:57

## 2024-10-29 RX ADMIN — PANTOPRAZOLE SODIUM 40 MG: 40 TABLET, DELAYED RELEASE ORAL at 05:42

## 2024-10-29 RX ADMIN — PREGABALIN 75 MG: 75 CAPSULE ORAL at 08:56

## 2024-10-29 RX ADMIN — LORAZEPAM 0.5 MG: 0.5 TABLET ORAL at 00:27

## 2024-10-29 RX ADMIN — CARVEDILOL 25 MG: 12.5 TABLET, FILM COATED ORAL at 18:06

## 2024-10-29 RX ADMIN — ENOXAPARIN SODIUM 30 MG: 100 INJECTION SUBCUTANEOUS at 21:06

## 2024-10-29 RX ADMIN — LINEZOLID 600 MG: 600 TABLET, FILM COATED ORAL at 08:57

## 2024-10-29 RX ADMIN — Medication 1 CAPSULE: at 08:56

## 2024-10-29 RX ADMIN — SODIUM CHLORIDE, PRESERVATIVE FREE 10 ML: 5 INJECTION INTRAVENOUS at 09:00

## 2024-10-29 RX ADMIN — ISOSORBIDE MONONITRATE 60 MG: 30 TABLET, EXTENDED RELEASE ORAL at 08:56

## 2024-10-29 RX ADMIN — ASPIRIN 81 MG: 81 TABLET, CHEWABLE ORAL at 08:56

## 2024-10-29 RX ADMIN — MICONAZOLE NITRATE: 20 CREAM TOPICAL at 21:07

## 2024-10-29 RX ADMIN — POTASSIUM CHLORIDE 20 MEQ: 1500 TABLET, EXTENDED RELEASE ORAL at 11:24

## 2024-10-29 RX ADMIN — ENOXAPARIN SODIUM 30 MG: 100 INJECTION SUBCUTANEOUS at 08:56

## 2024-10-29 RX ADMIN — Medication: at 08:58

## 2024-10-29 RX ADMIN — Medication: at 21:07

## 2024-10-29 ASSESSMENT — PAIN SCALES - GENERAL: PAINLEVEL_OUTOF10: 0

## 2024-10-30 ENCOUNTER — TELEPHONE (OUTPATIENT)
Age: 74
End: 2024-10-30

## 2024-10-30 VITALS
RESPIRATION RATE: 18 BRPM | BODY MASS INDEX: 35.33 KG/M2 | HEART RATE: 81 BPM | HEIGHT: 67 IN | DIASTOLIC BLOOD PRESSURE: 80 MMHG | WEIGHT: 225.09 LBS | TEMPERATURE: 98.8 F | SYSTOLIC BLOOD PRESSURE: 146 MMHG | OXYGEN SATURATION: 95 %

## 2024-10-30 LAB
ANION GAP SERPL CALC-SCNC: 6 MMOL/L (ref 2–12)
BASOPHILS # BLD: 0 K/UL (ref 0–0.1)
BASOPHILS NFR BLD: 0 % (ref 0–1)
BUN SERPL-MCNC: 27 MG/DL (ref 6–20)
BUN/CREAT SERPL: 18 (ref 12–20)
CALCIUM SERPL-MCNC: 9.7 MG/DL (ref 8.5–10.1)
CHLORIDE SERPL-SCNC: 106 MMOL/L (ref 97–108)
CO2 SERPL-SCNC: 28 MMOL/L (ref 21–32)
CREAT SERPL-MCNC: 1.53 MG/DL (ref 0.55–1.02)
DIFFERENTIAL METHOD BLD: ABNORMAL
EOSINOPHIL # BLD: 0.2 K/UL (ref 0–0.4)
EOSINOPHIL NFR BLD: 4 % (ref 0–7)
ERYTHROCYTE [DISTWIDTH] IN BLOOD BY AUTOMATED COUNT: 17 % (ref 11.5–14.5)
GLUCOSE SERPL-MCNC: 135 MG/DL (ref 65–100)
HCT VFR BLD AUTO: 31.8 % (ref 35–47)
HGB BLD-MCNC: 9.8 G/DL (ref 11.5–16)
IMM GRANULOCYTES # BLD AUTO: 0.1 K/UL (ref 0–0.04)
IMM GRANULOCYTES NFR BLD AUTO: 1 % (ref 0–0.5)
LYMPHOCYTES # BLD: 0.9 K/UL (ref 0.8–3.5)
LYMPHOCYTES NFR BLD: 19 % (ref 12–49)
MCH RBC QN AUTO: 26.8 PG (ref 26–34)
MCHC RBC AUTO-ENTMCNC: 30.8 G/DL (ref 30–36.5)
MCV RBC AUTO: 86.9 FL (ref 80–99)
MONOCYTES # BLD: 0.4 K/UL (ref 0–1)
MONOCYTES NFR BLD: 9 % (ref 5–13)
NEUTS SEG # BLD: 3.2 K/UL (ref 1.8–8)
NEUTS SEG NFR BLD: 67 % (ref 32–75)
NRBC # BLD: 0 K/UL (ref 0–0.01)
NRBC BLD-RTO: 0 PER 100 WBC
PLATELET # BLD AUTO: 284 K/UL (ref 150–400)
PMV BLD AUTO: 10.8 FL (ref 8.9–12.9)
POTASSIUM SERPL-SCNC: 3.4 MMOL/L (ref 3.5–5.1)
RBC # BLD AUTO: 3.66 M/UL (ref 3.8–5.2)
SODIUM SERPL-SCNC: 140 MMOL/L (ref 136–145)
WBC # BLD AUTO: 4.7 K/UL (ref 3.6–11)

## 2024-10-30 PROCEDURE — 6370000000 HC RX 637 (ALT 250 FOR IP): Performed by: INTERNAL MEDICINE

## 2024-10-30 PROCEDURE — 6360000002 HC RX W HCPCS: Performed by: INTERNAL MEDICINE

## 2024-10-30 PROCEDURE — 6360000002 HC RX W HCPCS: Performed by: ORTHOPAEDIC SURGERY

## 2024-10-30 PROCEDURE — 36415 COLL VENOUS BLD VENIPUNCTURE: CPT

## 2024-10-30 PROCEDURE — 6370000000 HC RX 637 (ALT 250 FOR IP): Performed by: ORTHOPAEDIC SURGERY

## 2024-10-30 PROCEDURE — 80048 BASIC METABOLIC PNL TOTAL CA: CPT

## 2024-10-30 PROCEDURE — 94640 AIRWAY INHALATION TREATMENT: CPT

## 2024-10-30 PROCEDURE — 94761 N-INVAS EAR/PLS OXIMETRY MLT: CPT

## 2024-10-30 PROCEDURE — 85025 COMPLETE CBC W/AUTO DIFF WBC: CPT

## 2024-10-30 RX ORDER — BUMETANIDE 1 MG/1
1 TABLET ORAL DAILY
Qty: 30 TABLET | Refills: 3 | Status: SHIPPED | OUTPATIENT
Start: 2024-10-31

## 2024-10-30 RX ORDER — OXYCODONE HYDROCHLORIDE 5 MG/1
5 TABLET ORAL DAILY PRN
Qty: 7 TABLET | Refills: 0 | Status: SHIPPED | OUTPATIENT
Start: 2024-10-30 | End: 2024-11-06

## 2024-10-30 RX ORDER — DOXYCYCLINE HYCLATE 100 MG
100 TABLET ORAL 2 TIMES DAILY
Qty: 68 TABLET | Refills: 0 | Status: SHIPPED | OUTPATIENT
Start: 2024-10-30 | End: 2024-12-03

## 2024-10-30 RX ADMIN — PREGABALIN 75 MG: 75 CAPSULE ORAL at 09:12

## 2024-10-30 RX ADMIN — BUMETANIDE 1 MG: 1 TABLET ORAL at 09:12

## 2024-10-30 RX ADMIN — LINEZOLID 600 MG: 600 TABLET, FILM COATED ORAL at 09:12

## 2024-10-30 RX ADMIN — ARFORMOTEROL TARTRATE: 15 SOLUTION RESPIRATORY (INHALATION) at 09:50

## 2024-10-30 RX ADMIN — ASPIRIN 81 MG: 81 TABLET, CHEWABLE ORAL at 09:11

## 2024-10-30 RX ADMIN — PANTOPRAZOLE SODIUM 40 MG: 40 TABLET, DELAYED RELEASE ORAL at 06:30

## 2024-10-30 RX ADMIN — Medication 1 CAPSULE: at 09:12

## 2024-10-30 RX ADMIN — ISOSORBIDE MONONITRATE 60 MG: 30 TABLET, EXTENDED RELEASE ORAL at 09:11

## 2024-10-30 RX ADMIN — CARVEDILOL 25 MG: 12.5 TABLET, FILM COATED ORAL at 09:11

## 2024-10-30 RX ADMIN — Medication: at 09:12

## 2024-10-30 RX ADMIN — ENOXAPARIN SODIUM 30 MG: 100 INJECTION SUBCUTANEOUS at 09:11

## 2024-10-30 RX ADMIN — MICONAZOLE NITRATE: 20 CREAM TOPICAL at 09:12

## 2024-10-30 NOTE — CARE COORDINATION
1730 [facility] scheduled    Transition of Care Plan:    RUR: 39% high risk  Prior Level of Functioning: assistance provided by   Disposition: home with resumption of [facility] PT/OT/nursing  ASHANTI: 10/30/24  If SNF or IPR: Date FOC offered:   Date FOC received:   Accepting facility:   Date authorization started with reference number:   Date authorization received and expires:   Follow up appointments: PCP and specialists as recommended  DME needed: pt has all needed DME  Transportation at discharge: BLS needed  IM/IMM Medicare/ letter given: 10/30/24  Is patient a [place] and connected with VA? N/a   If yes, was  transfer form completed and VA notified?   Caregiver Contact:  Wesley   Discharge Caregiver contacted prior to discharge? yes  Care Conference needed? no  Barriers to discharge: none    1530 - Pt has been cleared for discharge home. CM met with pt and Wesley in room to clarify discharge plan including [facility] resumption of care with addition of labs for Infectious Disease office and stretcher transport home. [facility] scheduled for 1730, care team notified, pt and Wesley notified. CM provided and reviewed second IM letter. Pt and Wesley shared concerns with prescriptions arriving at correct pharmacy before pharmacy closes, CM validated this concern. CM and RN have reached out to hospitalist to request rxs be sent to outpatient pharmacy for bedside delivery, RN working with hospitalist on this. Pt's preferred pharmacy information below in case outpatient pharmacy is not option. [facility] notified of discharge today.    [facility]  [address] [place] [address]  Phone: [phone]    Initial note - [facility] following to resume care of pt at discharge. Anticipate discharge today, pending nephro clearance, as discussed in IDT rounds. Infectious Disease MD has held multiple thorough conversations with pt and  Wesley; plan is discharge on 
Pt off unit for surgery; pt's  Wesley not in room for CM to complete assessment. Will continue to follow up with pt and Wesley.    Corie Espino, [facility]  Care Management  [phone]  
Transition of Care Plan:    RUR: 38% high risk  Prior Level of Functioning: assistance provided by   Disposition: home with resumption of [facility]  ASHANTI: 10/29/24  If SNF or IPR: Date FOC offered: 10/24/24  Date FOC received: 10/24/24 - SNF declined  Accepting facility:   Date authorization started with reference number:   Date authorization received and expires:   Follow up appointments: PCP and specialists as indicated  DME needed: pt has all needed DME  Transportation at discharge:  Wesley vs [facility], pending functional status day of discharge  IM/IMM Medicare/ letter given: 10/21/24  Is patient a  and connected with VA? N/a   If yes, was  transfer form completed and VA notified?   Caregiver Contact:  Wesley Oakes [phone]  Discharge Caregiver contacted prior to discharge? Yes, CM will continue to keep Wesley updated  Care Conference needed? no  Barriers to discharge: IV abx, medical clearance, final ID recs    Cultures are pending; Infectious Disease MD involved in pt's treatment course. Current plan is for pt to complete course of IV abx in-house before discharging home. Pt has all needed DME at home. CM opening referral to [facility] to ensure timely resumption of services at discharge.      Corie Espino, [facility]  Care Management  [phone]  
Within last 3 months  (September 2024)   Prior Functional Level Assistance with the following:;Bathing;Dressing;Toileting;Cooking;Housework;Shopping;Mobility   Current Functional Level Assistance with the following:;Bathing;Dressing;Toileting;Cooking;Housework;Shopping;Mobility   Can patient return to prior living arrangement Other (see comment)  (SNF current rec)   Ability to make needs known: Good   Family able to assist with home care needs: Other (comment)  (limited physical assistance)   Financial Resources Medicare;Other (Comment)  (Medicare A&B, BCBS [place] MCR supplement)   Social/Functional History   Lives With Spouse   Type of Home House   Home Layout One level   Home Access Ramped entrance   Bathroom Shower/Tub Shower chair with back   Bathroom Equipment Grab bars in shower;3-in-1 commode   Home Equipment Rollator;Walker - Rolling;Hospital bed   Receives Help From Family   ADL Assistance Needs assistance   Homemaking Responsibilities No   Ambulation Assistance Needs assistance   Transfer Assistance Needs assistance   Active  No   Patient's  Info  Wesley Oakes is    Discharge Planning   Patient expects to be discharged to: Skilled nursing facility     Advance Care Planning     General Advance Care Planning (ACP) Conversation    Date of Conversation: 10/24/2024  Conducted with: Patient with Decision Making Capacity  Other persons present: None    Healthcare Decision Maker:   Primary Decision Maker: VioletteMarkWesley - Spouse - [phone]    Secondary Decision Maker: Shelia Malik - Child - [phone]       Content/Action Overview:  Has ACP document(s) on file - reflects the patient's care preferences  Reviewed DNR/DNI and patient elects Full Code (Attempt Resuscitation)      Length of Voluntary ACP Conversation in minutes:  <16 minutes (Non-Billable)       Corie Espino TAMIKO  Care Management  [phone]

## 2024-10-30 NOTE — DISCHARGE SUMMARY
for sign off to her PCP) :  35 minutes    Signed:  Lazaro Nieves MD

## 2024-10-30 NOTE — PLAN OF CARE
Problem: Chronic Conditions and Co-morbidities  Goal: Patient's chronic conditions and co-morbidity symptoms are monitored and maintained or improved  Outcome: Progressing     Problem: Pain  Goal: Verbalizes/displays adequate comfort level or baseline comfort level  Outcome: Progressing     Problem: Respiratory - Adult  Goal: Achieves optimal ventilation and oxygenation  10/25/2024 2143 by Meri Herrera RN  Outcome: Progressing  10/25/2024 1920 by Danni Webber, RT  Outcome: Progressing  10/25/2024 0829 by Shauna Esquivel, ALEXISP  Outcome: Progressing     Problem: Safety - Adult  Goal: Free from fall injury  Outcome: Progressing     Problem: Emotional Distress  Goal: Verbalization of thoughts and feelings  Outcome: Progressing  Goal: Reduce evidence of anxiety/worry/anger  Outcome: Progressing  Goal: Identifies/restores coping resources/skills  Outcome: Progressing     Problem: Sacramental/Jewish Needs  Goal: Provide for sacramental/Moravian needs as appropriate,per patient/family request  Outcome: Progressing     Problem: Skin/Tissue Integrity  Goal: Absence of new skin breakdown  Description: 1.  Monitor for areas of redness and/or skin breakdown  2.  Assess vascular access sites hourly  3.  Every 4-6 hours minimum:  Change oxygen saturation probe site  4.  Every 4-6 hours:  If on nasal continuous positive airway pressure, respiratory therapy assess nares and determine need for appliance change or resting period.  Outcome: Progressing     
  Problem: Chronic Conditions and Co-morbidities  Goal: Patient's chronic conditions and co-morbidity symptoms are monitored and maintained or improved  Outcome: Progressing     Problem: Pain  Goal: Verbalizes/displays adequate comfort level or baseline comfort level  Outcome: Progressing     Problem: Respiratory - Adult  Goal: Achieves optimal ventilation and oxygenation  10/26/2024 0856 by Shauna Esquivel RCP  Outcome: Progressing     Problem: Safety - Adult  Goal: Free from fall injury  Outcome: Progressing     Problem: Spiritual Struggle  Goal: Verbalizes spiritual struggle  Outcome: Progressing  Goal: Gains new understanding/perception  Outcome: Progressing  Goal: Growing sense of peace/hope  Outcome: Progressing  Goal: Explore resources for additional follow up, post discharge  Outcome: Progressing     Problem: Emotional Distress  Goal: Verbalization of thoughts and feelings  Outcome: Progressing  Goal: Reduce evidence of anxiety/worry/anger  Outcome: Progressing  Goal: Identifies/restores coping resources/skills  Outcome: Progressing  Goal: Growing sense of peace/hope  Outcome: Progressing     Problem: Physical Therapy - Adult  Goal: By Discharge: Performs mobility at highest level of function for planned discharge setting.  See evaluation for individualized goals.  Description: FUNCTIONAL STATUS PRIOR TO ADMISSION: Patient required minimal assistance for basic and instrumental ADLs. Patient requiring increased assistance for the last week.    HOME SUPPORT PRIOR TO ADMISSION: The patient lived with  and required min/moderate assistance  for ADL's.    Physical Therapy Goals  Initiated 10/24/2024  1.  Patient will move from supine to sit and sit to supine  in bed with supervision/set-up within 7 day(s).    2.  Patient will transfer from bed to chair and chair to bed with moderate assistance  using the least restrictive device within 7 day(s).  3.  Patient will perform sit to stand with maximal 
  Problem: Chronic Conditions and Co-morbidities  Goal: Patient's chronic conditions and co-morbidity symptoms are monitored and maintained or improved  Outcome: Progressing     Problem: Pain  Goal: Verbalizes/displays adequate comfort level or baseline comfort level  Outcome: Progressing     Problem: Respiratory - Adult  Goal: Achieves optimal ventilation and oxygenation  10/27/2024 1658 by Nick Guzman RN  Outcome: Progressing  10/27/2024 0901 by Shauna Esquivel RCP  Outcome: Progressing     Problem: Safety - Adult  Goal: Free from fall injury  Outcome: Progressing     Problem: Spiritual Struggle  Goal: Verbalizes spiritual struggle  Outcome: Progressing  Goal: Gains new understanding/perception  Outcome: Progressing  Goal: Growing sense of peace/hope  Outcome: Progressing  Goal: Explore resources for additional follow up, post discharge  Outcome: Progressing     Problem: Emotional Distress  Goal: Verbalization of thoughts and feelings  Outcome: Progressing  Goal: Reduce evidence of anxiety/worry/anger  Outcome: Progressing  Goal: Identifies/restores coping resources/skills  Outcome: Progressing  Goal: Growing sense of peace/hope  Outcome: Progressing  Goal: Explore resources for additional follow up, post discharge  Outcome: Progressing     Problem: Life Adjustment  Goal: Verbalization of feelings regarding change/loss  Outcome: Progressing  Goal: Finding meaning/purpose in the midst of illness/suffering  Outcome: Progressing  Goal: Identifies/restores coping resources/skills  Outcome: Progressing  Goal: Growing sense of peace/hope  Outcome: Progressing  Goal: Explore resources for additional follow up, post discharge  Outcome: Progressing     Problem: Support with Decision-Making  Goal: Verbalization of thoughts/feelings regarding decision  Outcome: Progressing  Goal: Movement towards resolution of decision  Outcome: Progressing  Goal: Identifies/restores coping resources/skills  Outcome: 
  Problem: Chronic Conditions and Co-morbidities  Goal: Patient's chronic conditions and co-morbidity symptoms are monitored and maintained or improved  Outcome: Progressing     Problem: Pain  Goal: Verbalizes/displays adequate comfort level or baseline comfort level  Outcome: Progressing     Problem: Respiratory - Adult  Goal: Achieves optimal ventilation and oxygenation  10/30/2024 0104 by Jessica Tariq LPN  Outcome: Progressing  10/29/2024 2039 by Jyotsna Larson RCP  Outcome: Progressing     Problem: Safety - Adult  Goal: Free from fall injury  Outcome: Progressing     Problem: Spiritual Struggle  Goal: Gains new understanding/perception  Outcome: Progressing  Goal: Growing sense of peace/hope  Outcome: Progressing  Goal: Explore resources for additional follow up, post discharge  Outcome: Progressing     Problem: Emotional Distress  Goal: Verbalization of thoughts and feelings  Outcome: Progressing  Goal: Reduce evidence of anxiety/worry/anger  Outcome: Progressing  Goal: Identifies/restores coping resources/skills  Outcome: Progressing  Goal: Growing sense of peace/hope  Outcome: Progressing  Goal: Explore resources for additional follow up, post discharge  Outcome: Progressing     Problem: Life Adjustment  Goal: Verbalization of feelings regarding change/loss  Outcome: Progressing  Goal: Finding meaning/purpose in the midst of illness/suffering  Outcome: Progressing  Goal: Identifies/restores coping resources/skills  Outcome: Progressing  Goal: Growing sense of peace/hope  Outcome: Progressing  Goal: Explore resources for additional follow up, post discharge  Outcome: Progressing     Problem: Physical Therapy - Adult  Goal: By Discharge: Performs mobility at highest level of function for planned discharge setting.  See evaluation for individualized goals.  Description: FUNCTIONAL STATUS PRIOR TO ADMISSION: Patient required minimal assistance for basic and instrumental ADLs. Patient requiring increased 
  Problem: Chronic Conditions and Co-morbidities  Goal: Patient's chronic conditions and co-morbidity symptoms are monitored and maintained or improved  Outcome: Progressing  Flowsheets (Taken 10/22/2024 0352)  Care Plan - Patient's Chronic Conditions and Co-Morbidity Symptoms are Monitored and Maintained or Improved:   Monitor and assess patient's chronic conditions and comorbid symptoms for stability, deterioration, or improvement   Collaborate with multidisciplinary team to address chronic and comorbid conditions and prevent exacerbation or deterioration   Update acute care plan with appropriate goals if chronic or comorbid symptoms are exacerbated and prevent overall improvement and discharge     Problem: Pain  Goal: Verbalizes/displays adequate comfort level or baseline comfort level  Outcome: Progressing  Flowsheets (Taken 10/22/2024 0352)  Verbalizes/displays adequate comfort level or baseline comfort level:   Encourage patient to monitor pain and request assistance   Assess pain using appropriate pain scale   Administer analgesics based on type and severity of pain and evaluate response   Implement non-pharmacological measures as appropriate and evaluate response   Consider cultural and social influences on pain and pain management   Notify Licensed Independent Practitioner if interventions unsuccessful or patient reports new pain     Problem: Respiratory - Adult  Goal: Achieves optimal ventilation and oxygenation  10/22/2024 0352 by Yared Odom, RN  Outcome: Progressing  Flowsheets (Taken 10/22/2024 0352)  Achieves optimal ventilation and oxygenation:   Assess for changes in respiratory status   Assess for changes in mentation and behavior   Position to facilitate oxygenation and minimize respiratory effort  10/22/2024 0056 by Adrian PADILLA, RT  Outcome: Progressing     Problem: Safety - Adult  Goal: Free from fall injury  Outcome: Progressing  Flowsheets (Taken 10/22/2024 0352)  Free From Fall 
  Problem: Occupational Therapy - Adult  Goal: By Discharge: Performs self-care activities at highest level of function for planned discharge setting.  See evaluation for individualized goals.  Description: FUNCTIONAL STATUS PRIOR TO ADMISSION:  Patient required minimal assistance for basic and instrumental ADLs. Patient requiring increased assistance for the last week. Pt was completing  PT and  wound care RN x2 week     , ADL Assistance: Needs assistance,  ,  ,  ,  , Toileting: Needs assistance, Homemaking Assistance: Needs assistance, Ambulation Assistance: Needs assistance,  ,       HOME SUPPORT: The patient lived with  and required min/moderate assistance  for ADL's.    Occupational Therapy Goals:  Initiated 10/24/2024  1.  Patient will perform upper body dressing with Supervision within 7 day(s).  2.  Patient will perform lower body dressing with Supervision, and use of DME, PRN, within 7 day(s).  3.  Patient will perform anterior bathing with Supervision within 7 day(s).  4.  Patient will perform toilet transfers with Minimal Assist  within 7 day(s).  5.  Patient will perform all aspects of toileting with Minimal Assist within 7 day(s).  6.  Patient will participate in upper extremity therapeutic exercise/activities with Minimal Assist for 5 minutes within 7 day(s).    7.  Patient will utilize energy conservation techniques during functional activities with verbal cues within 7 day(s).   10/28/2024 0951 by Dustin Sandhu, CAROLE  Outcome: Progressing    OCCUPATIONAL THERAPY TREATMENT  Patient: Marilee Oakes (74 y.o. female)  Date: 10/28/2024  Primary Diagnosis: Cellulitis of left lower extremity [L03.116]  Cellulitis of left lower leg [L03.116]  Fever, unspecified fever cause [R50.9]  Procedure(s) (LRB):  LEFT ANKLE INCISION AND DRAINAGE AND REMOVAL OF MTT NAIL (Left) 5 Days Post-Op   Precautions: Fall Risk, Weight Bearing   Left Lower Extremity Weight Bearing: Non Weight Bearing            Chart, 
  Problem: Occupational Therapy - Adult  Goal: By Discharge: Performs self-care activities at highest level of function for planned discharge setting.  See evaluation for individualized goals.  Description: FUNCTIONAL STATUS PRIOR TO ADMISSION:  Patient required minimal assistance for basic and instrumental ADLs. Patient requiring increased assistance for the last week. Pt was completing  PT and  wound care RN x2 week     , ADL Assistance: Needs assistance,  ,  ,  ,  , Toileting: Needs assistance, Homemaking Assistance: Needs assistance, Ambulation Assistance: Needs assistance,  ,       HOME SUPPORT: The patient lived with  and required min/moderate assistance  for ADL's.    Occupational Therapy Goals:  Initiated 10/24/2024  1.  Patient will perform upper body dressing with Supervision within 7 day(s).  2.  Patient will perform lower body dressing with Supervision, and use of DME, PRN, within 7 day(s).  3.  Patient will perform anterior bathing with Supervision within 7 day(s).  4.  Patient will perform toilet transfers with Minimal Assist  within 7 day(s).  5.  Patient will perform all aspects of toileting with Minimal Assist within 7 day(s).  6.  Patient will participate in upper extremity therapeutic exercise/activities with Minimal Assist for 5 minutes within 7 day(s).    7.  Patient will utilize energy conservation techniques during functional activities with verbal cues within 7 day(s).   Outcome: Progressing    OCCUPATIONAL THERAPY TREATMENT  Patient: Marilee Oakes (74 y.o. female)  Date: 10/29/2024  Primary Diagnosis: Cellulitis of left lower extremity [L03.116]  Cellulitis of left lower leg [L03.116]  Fever, unspecified fever cause [R50.9]  Procedure(s) (LRB):  LEFT ANKLE INCISION AND DRAINAGE AND REMOVAL OF MTT NAIL (Left) 6 Days Post-Op   Precautions: Fall Risk, Weight Bearing   Left Lower Extremity Weight Bearing: Non Weight Bearing            Chart, occupational therapy assessment, plan of 
  Problem: Pain  Goal: Verbalizes/displays adequate comfort level or baseline comfort level  Outcome: Progressing     Problem: Safety - Adult  Goal: Free from fall injury  Outcome: Progressing     
  Problem: Physical Therapy - Adult  Goal: By Discharge: Performs mobility at highest level of function for planned discharge setting.  See evaluation for individualized goals.  Description: FUNCTIONAL STATUS PRIOR TO ADMISSION: Patient required minimal assistance for basic and instrumental ADLs. Patient requiring increased assistance for the last week.    HOME SUPPORT PRIOR TO ADMISSION: The patient lived with  and required min/moderate assistance  for ADL's.    Physical Therapy Goals  Initiated 10/24/2024  1.  Patient will move from supine to sit and sit to supine  in bed with supervision/set-up within 7 day(s).    2.  Patient will transfer from bed to chair and chair to bed with moderate assistance  using the least restrictive device within 7 day(s).  3.  Patient will perform sit to stand with maximal assistance within 7 day(s).  4.  Patient will ambulate with moderate assistance  for 5 feet with the least restrictive device within 7 day(s).       Outcome: Progressing      PHYSICAL THERAPY TREATMENT    Patient: Marilee Oakes (74 y.o. female)  Date: 10/28/2024  Diagnosis: Cellulitis of left lower extremity [L03.116]  Cellulitis of left lower leg [L03.116]  Fever, unspecified fever cause [R50.9] Cellulitis of left lower extremity  Procedure(s) (LRB):  LEFT ANKLE INCISION AND DRAINAGE AND REMOVAL OF MTT NAIL (Left) 5 Days Post-Op  Precautions: Fall Risk, Weight Bearing   Left Lower Extremity Weight Bearing: Non Weight Bearing                  ASSESSMENT:  Patient continues to benefit from skilled PT services and is slowly progressing towards goals. Pt received semi fowlers in bed, agreeable to participate in therapy. Pt reports feeling more lethargic today however very responsive and motivated to session. Improved bed mobility this date, requiring less assist than previous sessions. Fair tolerance to NWB precautions when standing. Difficulty achieving full standing position with good balance, requiring 
  Problem: Physical Therapy - Adult  Goal: By Discharge: Performs mobility at highest level of function for planned discharge setting.  See evaluation for individualized goals.  Description: FUNCTIONAL STATUS PRIOR TO ADMISSION: Patient required minimal assistance for basic and instrumental ADLs. Patient requiring increased assistance for the last week.    HOME SUPPORT PRIOR TO ADMISSION: The patient lived with  and required min/moderate assistance  for ADL's.    Physical Therapy Goals  Initiated 10/24/2024  1.  Patient will move from supine to sit and sit to supine  in bed with supervision/set-up within 7 day(s).    2.  Patient will transfer from bed to chair and chair to bed with moderate assistance  using the least restrictive device within 7 day(s).  3.  Patient will perform sit to stand with maximal assistance within 7 day(s).  4.  Patient will ambulate with moderate assistance  for 5 feet with the least restrictive device within 7 day(s).       Outcome: Progressing   PHYSICAL THERAPY TREATMENT    Patient: Marilee Oakes (74 y.o. female)  Date: 10/26/2024  Diagnosis: Cellulitis of left lower extremity [L03.116]  Cellulitis of left lower leg [L03.116]  Fever, unspecified fever cause [R50.9] Cellulitis of left lower extremity  Procedure(s) (LRB):  LEFT ANKLE INCISION AND DRAINAGE AND REMOVAL OF MTT NAIL (Left) 3 Days Post-Op  Precautions: Fall Risk, Weight Bearing   Left Lower Extremity Weight Bearing: Non Weight Bearing                  ASSESSMENT:  Patient continues to benefit from skilled PT services and is slowly progressing towards goals. Patient sitting up in bed, agreeable to mobility. Able to come to EOB with min assist x 2 and has good sitting balance. Sit to stand with mod assist x 2 with therapist placing foot under left LE to ensure NWB status. Patient does place some weight on left LE while coming to stand but able to maintain NWB for 15 seconds while standing.  Returned to bed for a rest 
  Problem: Physical Therapy - Adult  Goal: By Discharge: Performs mobility at highest level of function for planned discharge setting.  See evaluation for individualized goals.  Description: FUNCTIONAL STATUS PRIOR TO ADMISSION: Patient required minimal assistance for basic and instrumental ADLs. Patient requiring increased assistance for the last week.    HOME SUPPORT PRIOR TO ADMISSION: The patient lived with  and required min/moderate assistance  for ADL's.    Physical Therapy Goals  Initiated 10/24/2024  1.  Patient will move from supine to sit and sit to supine  in bed with supervision/set-up within 7 day(s).    2.  Patient will transfer from bed to chair and chair to bed with moderate assistance  using the least restrictive device within 7 day(s).  3.  Patient will perform sit to stand with maximal assistance within 7 day(s).  4.  Patient will ambulate with moderate assistance  for 5 feet with the least restrictive device within 7 day(s).       Outcome: Progressing   PHYSICAL THERAPY TREATMENT    Patient: Marilee Oakes (74 y.o. female)  Date: 10/29/2024  Diagnosis: Cellulitis of left lower extremity [L03.116]  Cellulitis of left lower leg [L03.116]  Fever, unspecified fever cause [R50.9] Cellulitis of left lower extremity  Procedure(s) (LRB):  LEFT ANKLE INCISION AND DRAINAGE AND REMOVAL OF MTT NAIL (Left) 6 Days Post-Op  Precautions: Fall Risk, Weight Bearing   Left Lower Extremity Weight Bearing: Non Weight Bearing                  ASSESSMENT:  Patient continues to benefit from skilled PT services and is progressing towards goals. Pt received semi fowlers in bed, agreeable to participate in therapy. Pt demonstrating significant improvements with mobility this date. Required less assist for each mobility task with improved adherence to NWB precautions when transitioning from sit to stand. Good tolerance to slide board transfer however required assist for placement/removal (pt states  does this 
  Problem: Respiratory - Adult  Goal: Achieves optimal ventilation and oxygenation  10/29/2024 2039 by Jyotsna Larson RCP  Outcome: Progressing     
and social influences on pain and pain management   Notify Licensed Independent Practitioner if interventions unsuccessful or patient reports new pain     Problem: Respiratory - Adult  Goal: Achieves optimal ventilation and oxygenation  10/22/2024 1022 by Araceli Maki LPN  Outcome: Progressing  10/22/2024 1010 by Zabrina Garay, RT  Outcome: Progressing  Flowsheets (Taken 10/22/2024 0800 by Araceli Maki LPN)  Achieves optimal ventilation and oxygenation:   Assess for changes in respiratory status   Assess for changes in mentation and behavior   Position to facilitate oxygenation and minimize respiratory effort   Oxygen supplementation based on oxygen saturation or arterial blood gases   Encourage broncho-pulmonary hygiene including cough, deep breathe, incentive spirometry   Assess the need for suctioning and aspirate as needed   Assess and instruct to report shortness of breath or any respiratory difficulty   Respiratory therapy support as indicated  10/22/2024 0352 by Yared Odom, RN  Outcome: Progressing  Flowsheets (Taken 10/22/2024 0352)  Achieves optimal ventilation and oxygenation:   Assess for changes in respiratory status   Assess for changes in mentation and behavior   Position to facilitate oxygenation and minimize respiratory effort  10/22/2024 0056 by Adrian PADILLA, RT  Outcome: Progressing     Problem: Safety - Adult  Goal: Free from fall injury  10/22/2024 1022 by Araceli Maki LPN  Outcome: Progressing  Flowsheets (Taken 10/22/2024 0800)  Free From Fall Injury: Instruct family/caregiver on patient safety  10/22/2024 0352 by Yared Odom, RN  Outcome: Progressing  Flowsheets (Taken 10/22/2024 0352)  Free From Fall Injury: Instruct family/caregiver on patient safety     
chair but becomes fatigued and starts to put slight weight on left LE but responds to verbal cues. Up in chair at end of session with LE's elevated. O2 removed and able to maintain sats above 95%.    Based on the impairments listed patient will need constant assistance with moblity and some ADL's.    Patient will benefit from skilled intervention to address the above impairments.    Functional Outcome Measure:  The patient scored 12.24 on the [facility] outcome measure which is indicative of being more likely to require SNF/ADL's.           PLAN :  Recommendations and Planned Interventions:   bed mobility training, transfer training, therapeutic exercises, patient and family training/education, and therapeutic activities    Frequency/Duration: Patient will be followed by physical therapy to address goals, PT Plan of Care: 6 times/week to address goals.    Recommend with staff: therapy recommendations for staff: Recommend mobility with staff assist x2 using gait belt and rolling walker.      Recommendation for discharge: (in order for the patient to meet his/her long term goals):   Moderate intensity short-term skilled physical therapy up to 5x/week    Other factors to consider for discharge: patient's current support system is unable to meet their requirements for physical assistance, high risk for falls, not safe to be alone, and new weight bearing restrictions limiting activity or patient is unable to maintain    IF patient discharges home will need the following DME: patient owns DME required for discharge                SUBJECTIVE:   Patient stated “It really doesn't hurt.”    OBJECTIVE DATA SUMMARY:       Past Medical History:   Diagnosis Date    Acute and chronic respiratory failure with hypoxia     Age-related osteoporosis without current pathological fracture     Age-related osteoporosis without current pathological fracture     Anemia in chronic kidney disease     Anxiety and depression 01/22/2018    Arthritis  
suctioning and aspirate as needed   Assess and instruct to report shortness of breath or any respiratory difficulty   Respiratory therapy support as indicated     Problem: Safety - Adult  Goal: Free from fall injury  Outcome: Progressing  Flowsheets (Taken 10/22/2024 0800 by Araceli Maki LPN)  Free From Fall Injury: Instruct family/caregiver on patient safety     Problem: Spiritual Struggle  Goal: Verbalizes spiritual struggle  Outcome: Progressing  Goal: Gains new understanding/perception  Outcome: Progressing  Goal: Growing sense of peace/hope  Outcome: Progressing  Goal: Explore resources for additional follow up, post discharge  Outcome: Progressing     Problem: Emotional Distress  Goal: Verbalization of thoughts and feelings  Outcome: Progressing  Goal: Reduce evidence of anxiety/worry/anger  Outcome: Progressing  Goal: Identifies/restores coping resources/skills  Outcome: Progressing  Goal: Growing sense of peace/hope  Outcome: Progressing  Goal: Explore resources for additional follow up, post discharge  Outcome: Progressing     Problem: Life Adjustment  Goal: Verbalization of feelings regarding change/loss  Outcome: Progressing  Goal: Finding meaning/purpose in the midst of illness/suffering  Outcome: Progressing  Goal: Identifies/restores coping resources/skills  Outcome: Progressing  Goal: Growing sense of peace/hope  Outcome: Progressing  Goal: Explore resources for additional follow up, post discharge  Outcome: Progressing     Problem: Support with Decision-Making  Goal: Verbalization of thoughts/feelings regarding decision  Outcome: Progressing  Goal: Movement towards resolution of decision  Outcome: Progressing  Goal: Identifies/restores coping resources/skills  Outcome: Progressing  Goal: Explore resources for additional follow up, post discharge  Outcome: Progressing     Problem: Unresolved Conflict/Relationships  Goal: Verbalization of thoughts and feelings  Outcome: Progressing  Goal: Movement 
1658 by Nick Guzman RN  Outcome: Progressing     Problem: Grief  Goal: Verbalizaton of thoughts and feelings regarding loss  10/27/2024 1658 by Nick Guzman RN  Outcome: Progressing  Goal: Establishing/maintaining support systems  10/27/2024 1658 by Nick Guzman RN  Outcome: Progressing  Goal: Growing sense of grief as a process and identify ways of coping  10/27/2024 1658 by Nick Guzman RN  Outcome: Progressing  Goal: Explore resources for additional follow up, post discharge  10/27/2024 1658 by Nick Guzman RN  Outcome: Progressing     Problem: End of Life  Goal: Verbalization of thoughts and feelings regarding death/dying  10/27/2024 1658 by Nick Guzman RN  Outcome: Progressing  Goal: Address spiritual needs  10/27/2024 1658 by Nick Guzman RN  Outcome: Progressing  Goal: Acknowledge a sense of peace  10/27/2024 1658 by Nick Guzman RN  Outcome: Progressing  Goal: Identifies/restores coping resources/skills  10/27/2024 1658 by Nick Guzman RN  Outcome: Progressing  Goal: Death with dignity  10/27/2024 1658 by Nick Guzman RN  Outcome: Progressing  Goal: Explore resources for additional follow up, post discharge  10/27/2024 1658 by Nick Guzman RN  Outcome: Progressing     Problem: Skin/Tissue Integrity  Goal: Absence of new skin breakdown  Description: 1.  Monitor for areas of redness and/or skin breakdown  2.  Assess vascular access sites hourly  3.  Every 4-6 hours minimum:  Change oxygen saturation probe site  4.  Every 4-6 hours:  If on nasal continuous positive airway pressure, respiratory therapy assess nares and determine need for appliance change or resting period.  10/27/2024 1658 by Nick Guzman RN  Outcome: Progressing     
restrictive device within 7 day(s).       10/24/2024 1234 by Jeanna Lewis, PT  Outcome: Not Progressing     
feeling chair on back of legs and reaching back with 1-2 UE to slowly lower self to seated position.                                                                                                                                                                                                                                          Barthel Index:    Barthel Index Scale  Feeding: Independent, Able to apply any necessary device. Feeds in reasonable time  Bathing: Cannot perform activity  Grooming: Washes face, lomax hair, brushes teeth, shaves (manages plug if electric razor)  Dressing: Needs help, but does at least half of task within reasonable time  Bowel Control: Occasional accidents or needs help with device  Bladder Control: Occasional accidents or needs help with device  Toilet Transfers: Needs help for balance, handling clothes or toilet paper  Chair/Bed Trannsfers: Minimum assistance or supervision required  Ambulation: Cannot perform activity  Stairs: Cannot perform activity  Total Barthel Index Score: 45       The Barthel ADL Index: Guidelines  1. The index should be used as a record of what a patient does, not as a record of what a patient could do.  2. The main aim is to establish degree of independence from any help, physical or verbal, however minor and for whatever reason.  3. The need for supervision renders the patient not independent.  4. A patient's performance should be established using the best available evidence. Asking the patient, friends/relatives and nurses are the usual sources, but direct observation and common sense are also important. However direct testing is not needed.  5. Usually the patient's performance over the preceding 24-48 hours is important, but occasionally longer periods will be relevant.  6. Middle categories imply that the patient supplies over 50 per cent of the effort.  7. Use of aids to be independent is allowed.    Score Interpretation (from [place]agan 1997)

## 2024-10-30 NOTE — PROGRESS NOTES
Hospitalist Progress Note    NAME:   Marilee Oakes   : 1950   MRN: [id number]     Date/Time: 10/28/2024 1:27 PM  Patient PCP: Darien Potter MD    Estimated discharge date: 10/29  Barriers: wound culture, ID recommendations regarding antibiotics      Assessment / Plan:    Acute osteomyelitis involving tibia, talus, calcaneus s/p arthrodesis, ORIF and removal of hardware.  Left lower extremity cellulitis  Chronic wounds  History left ankle fracture complicated by MRSA infection requiring hardware removal with recurrent surgical site infections  Wound culture shows MRSA  S/p empiric IV antibiotics, cefepime  S/p  vancomycin   S/p   doxycycline   Now started on po linezolid by ID   S/p left ankle I&D, removal of TCC nail by Dr. Chan on 10/23/24  Continue PTA Lyrica  Tylenol and/or oxycodone available as needed pain     HILARY on CKD 3B, at baseline  History ATN requiring HD temporarily   called to emphasize need to avoid daptomycin  Trend renal function  Appreciate nephrology consult- s/p normal saline 100 cc/hour  Bumex resumed  Renally dose medications and avoid nephrotoxic agents   Vancomycin discontinued       Normocytic anemia, at baseline  Trend hemoglobin  Continue PTA iron supplementation     Diastolic CHF  CAD  Essential hypertension  Hyperlipidemia  Continue PTA Coreg, Jardiance, Imdur  Bumex resumed  Resume  aspirin     Atrial fibrillation:  Watchman present  Retained RV pacing lead +     COPD with chronic hypoxemic respiratory failure 2-3 L requirement  Formulary substitution arformoterol budesonide nebulizer  Albuterol neb every 4 hours as needed     History   Delirium precautions  Nightly melatonin    Constipation:  Had BM yesterday after enema  Continue miralax daily  continue senna colace daily     Medical Decision Making:   I personally reviewed labs:   BMP: creatinine trending up 1.6 to 1.9  Potassium improved from 3 to 3.7        I personally reviewed imaging:   Toxic 
      Hospitalist Progress Note    NAME:   Marilee Oakes   : 1950   MRN: [id number]     Date/Time: 10/24/2024 11:10 AM  Patient PCP: Darien Potter MD    Estimated discharge date: 10/28  Barriers: IV antibiotics, intra operative culture results.       Assessment / Plan:      Left lower extremity cellulitis  Chronic wounds  History left ankle fracture complicated by MRSA infection requiring hardware removal with recurrent surgical site infections    Continue empiric IV antibiotics, vancomycin, cefepime  Wound care eval  S/p left ankle I&D, removal of TCC nail by Dr. Chan on 10/23/24  Continue PTA Lyrica  Tylenol and/or oxycodone available as needed pain     CKD 3B, at baseline  History ATN attributed to daptomycin   called to emphasize need to avoid daptomycin  Given history MRSA infection have limited options-will utilize vancomycin with close monitoring  Trend renal function  Renally dose medications and avoid nephrotoxic agents  Low threshold for nephrology consultation       Normocytic anemia, at baseline  Trend hemoglobin  Continue PTA iron supplementation     Diastolic CHF  CAD  Essential hypertension  Hyperlipidemia  Continue PTA  Bumex, Coreg, Jardiance, Imdur  Resume aspirin when cleared by ortho     COPD with chronic hypoxemic respiratory failure 2-3 L requirement  Formulary substitution arformoterol budesonide nebulizer  Albuterol neb every 4 hours as needed     History   Delirium precautions  Nightly melatonin     Medical Decision Making:   I personally reviewed labs:   BMP: creatinine stable at 1.59- will closely monitor creatinine while patient is on vancomycin  Potassium level improved from 3.1 to 3.7    I personally reviewed imaging:   Toxic drug monitoring: Vancomycin level 21.9 yesterday  Discussed case with: RN, patient, IDR     Code Status: Full  DVT Prophylaxis: Lovenox once cleared by ortho, currently held   GI Prophylaxis: Protonix  Baseline: Home with 
      Hospitalist Progress Note    NAME:   Marilee Oakes   : 1950   MRN: [id number]     Date/Time: 10/27/2024 2:28 PM  Patient PCP: Darien Potter MD    Estimated discharge date: 10/28  Barriers: wound culture, ID recommendations regarding antibiotics      Assessment / Plan:      Left lower extremity cellulitis  Chronic wounds  History left ankle fracture complicated by MRSA infection requiring hardware removal with recurrent surgical site infections  Wound culture shows MRSA  S/p empiric IV antibiotics, cefepime  S/p  vancomycin   S/p   doxycycline   Now started on po linezolid by ID   S/p left ankle I&D, removal of TCC nail by Dr. Chan on 10/23/24  Continue PTA Lyrica  Tylenol and/or oxycodone available as needed pain     HILARY on CKD 3B, at baseline  History ATN requiring HD temporarily   called to emphasize need to avoid daptomycin  Trend renal function  Appreciate nephrology consult- s/p normal saline 100 cc/hour  Hold bumex  Renally dose medications and avoid nephrotoxic agents   Vancomycin discontinued       Normocytic anemia, at baseline  Trend hemoglobin  Continue PTA iron supplementation     Diastolic CHF  CAD  Essential hypertension  Hyperlipidemia  Continue PTA Coreg, Jardiance, Imdur  Hold lasix  Resume aspirin when cleared by ortho     COPD with chronic hypoxemic respiratory failure 2-3 L requirement  Formulary substitution arformoterol budesonide nebulizer  Albuterol neb every 4 hours as needed     History sundowning  Delirium precautions  Nightly melatonin    Constipation:  Continue miralax daily  Start senna colace daily     Medical Decision Making:   I personally reviewed labs:   BMP: creatinine trending down from 1.7 to 1.6  Potassium improved from 3 to 3.7        I personally reviewed imaging:   Toxic drug monitoring:   Discussed case with: RN, patient, patient's        Code Status: Full  DVT Prophylaxis: resume lovenox   GI Prophylaxis: Protonix  Baseline: Home with 
      Hospitalist Progress Note    NAME:   Marilee Oakes   : 1950   MRN: [id number]     Date/Time: 10/25/2024 12:06 PM  Patient PCP: Darien Potter MD    Estimated discharge date: 10/28  Barriers: IV antibiotics, intra operative culture results, improvement in creatinine      Assessment / Plan:      Left lower extremity cellulitis  Chronic wounds  History left ankle fracture complicated by MRSA infection requiring hardware removal with recurrent surgical site infections  Wound culture shows MRSA  Continue empiric IV antibiotics, cefepime  Discontinue vancomycin and start doxycycline due to worsening creatinine  S/p left ankle I&D, removal of TCC nail by Dr. Chan on 10/23/24  Continue PTA Lyrica  Tylenol and/or oxycodone available as needed pain     HILARY on CKD 3B, at baseline  History ATN requiring HD temporarily   called to emphasize need to avoid daptomycin  Trend renal function  Appreciate nephrology consult- started on normal saline 100 cc/hour  Hold bumex  Renally dose medications and avoid nephrotoxic agents  Discontinue vancomycin       Normocytic anemia, at baseline  Trend hemoglobin  Continue PTA iron supplementation     Diastolic CHF  CAD  Essential hypertension  Hyperlipidemia  Continue PTA Coreg, Jardiance, Imdur  Hold lasix  Resume aspirin when cleared by ortho     COPD with chronic hypoxemic respiratory failure 2-3 L requirement  Formulary substitution arformoterol budesonide nebulizer  Albuterol neb every 4 hours as needed     History sundowning  Delirium precautions  Nightly melatonin    Constipation:  Change miralax prn to daily     Medical Decision Making:   I personally reviewed labs:   BMP: creatinine trending up from 1.59 to 1.97  Low potassium 2.8- will replete  Cbc: Hb stable  Wound culture MRSA    I personally reviewed imaging:   Toxic drug monitoring: monitor creatinine while patient is getting iv fluids. Vancomycin discontinued due to worsening renal function  Discussed 
      Hospitalist Progress Note    NAME:   Marilee Oakes   : 1950   MRN: [id number]     Date/Time: 10/23/2024 11:27 AM  Patient PCP: Darien Potter MD    Estimated discharge date: Greater than 2 days  Barriers: IV antibiotics, Ortho planning for surgery today.       Assessment / Plan:      Left lower extremity cellulitis  Chronic wounds  History left ankle fracture complicated by MRSA infection requiring hardware removal with recurrent surgical site infections    Continue empiric IV antibiotics, vancomycin, cefepime  Wound care eval  Appreciate ortho consult- plan for left ankle I&D, removal of TCC nail today by Dr. Chan  Question friction blister/injury from poorly fitting walking boot given distribution of wounds  Continue PTA Lyrica  Tylenol and/or oxycodone available as needed pain     CKD 3B, at baseline  History ATN attributed to daptomycin   called to emphasize need to avoid daptomycin  Given history MRSA infection have limited options-will utilize vancomycin with close monitoring  Trend renal function  Renally dose medications and avoid nephrotoxic agents  Low threshold for nephrology consultation       Normocytic anemia, at baseline  Trend hemoglobin  Continue PTA iron supplementation     Diastolic CHF  CAD  Essential hypertension  Hyperlipidemia  Continue PTA aspirin, Bumex, Coreg, Jardiance, Imdur     COPD with chronic hypoxemic respiratory failure 2-3 L requirement  Formulary substitution arformoterol budesonide nebulizer  Albuterol neb every 4 hours as needed     History sundowning  Delirium precautions  Nightly melatonin     Medical Decision Making:   I personally reviewed labs:   BMP: creatinine stable at 1.45  Potassium low 3.1- will replete    I personally reviewed imaging: Left foot x-ray  Toxic drug monitoring: Vancomycin  Discussed case with: RN, patient, IDR     Code Status: Full  DVT Prophylaxis: Lovenox  GI Prophylaxis: Protonix  Baseline: Home with 
  Physician Progress Note      PATIENT:               NEDRA WEBER  CSN #:                  [id number]  :                       1950  ADMIT DATE:       10/21/2024 5:06 PM  DISCH DATE:  RESPONDING  PROVIDER #:        Kathy Siddiqui MD          QUERY TEXT:    Pt admitted with Left lower extremity cellulitis and underwent \"tibial nailing   and arthrodesis by Dr Maame Chan over 3 months ago\", noted documentation   of 10/22/24 Orthopedics Consult. ? If possible, please document in progress   notes and discharge summary the relationship if any between the Left lower   extremity cellulitis and the surgery:  ?  The medical record reflects the following:    Risk Factors:  75 yo female with hx of MRSA, DM and recurrent surgical site infections    Clinical Indicators:  10/21/24 H&P: Left lower extremity cellulitis, Chronic wounds, History left   ankle fracture complicated by MRSA infection requiring hardware removal with   recurrent surgical site infections    10/24 Op note: Left lower extremity infection status post history of   tibiotalar calcaneal joint fusion, retrograde nail for fusion, History of   previous open reduction internal fixation and removal of hardware.    Treatment:  ortho consult, surgery, cefepine, vanco, gentamicin, labs, Infectious disease   consult, wound nurse consult, contact isolation, wound care  Options provided:  -- Left lower extremity cellulitis is due to the procedure  -- Left lower extremity cellulitis is not due to the procedure, but is due to   DM  -- Other - I will add my own diagnosis  -- Disagree - Not applicable / Not valid  -- Disagree - Clinically unable to determine / Unknown  -- Refer to Clinical Documentation Reviewer    PROVIDER RESPONSE TEXT:    Provider is clinically unable to determine a response to this query.    Query created by: Estrella Coppola on 10/24/2024 1:43 PM      Electronically signed by:  Kathy Siddiqui MD 10/24/2024 1:46 PM          
  Physician Progress Note      PATIENT:               NEDRA WEBER  [facility] #:                  [id number]  :                       1950  ADMIT DATE:       10/21/2024 5:06 PM  DISCH DATE:  RESPONDING  PROVIDER #:        Lazaro Nieves MD          QUERY TEXT:    Patient admitted.  Noted documentation of Acute Kidney Injury in Nephrology   consult on 10/25/24.  In order to support the diagnosis of HILARY, please include   additional clinical indicators in your documentation.? Or please document if   the diagnosis of HILARY has been ruled out after further study.  The medical record reflects the following:    Risk Factors:  73 yo female with hx of CKD3    Clinical Indicators:  10/25 neph cons:  ? HILARY stage I(secondary to labile hemodynamics, ongoing diuretics, volume   depletion, concomitant Bumex use)  ? CKD stage IIIb(baseline creatinine 1.5-1.7)    10/25/24 06:21  Creatinine: 1.97 (H)    10/29/24 09:31  Creatinine: 1.88 (H)    Treatment:  IV fluids, labs, renal ultrasound, daily weights, straight cath if pt unable   to void, vitals per unit routine    Defined by Kidney Disease Improving Global Outcomes (KDIGO) clinical practice   guideline for acute kidney injury:  -Increase in SCr by greater than or equal to 0.3 mg/dl within 48 hours; or  -Increase or decrease in SCr to greater than or equal to 1.5 times baseline,   which is known or presumed to have occurred within the prior 7 days; or  -Urine volume < 0.5ml/kg/h for 6 hours.  Options provided:  -- Acute kidney injury evidenced by, Please document evidence as well as a   numerical baseline creatinine, if known.  -- Currently resolved acute kidney injury was evidenced by, Please document   evidence as well as a numerical baseline creatinine, if known.  -- Acute kidney injury ruled out after study  -- Other - I will add my own diagnosis  -- Disagree - Not applicable / Not valid  -- Disagree - Clinically unable to determine / Unknown  -- Refer to Clinical 
 Monitoring - Delirium Plan   Visited patient for nursing assessment of delirium, as notified on rounds to have  or risk of delirium.  This plan is made based on Screenings and Subjective Factors below.     Monitoring Tier 1. Positive screening of delirium, elopement, weakness, agitation is possible.   LYNNE Nurse Dx and Interventions   Checked indicates considerations. [x] Confusion  Acute vs Chronic     [x] Open blinds during waking hours  [] Ambulate & interact in luevano  [x] Ask pt's sleep schedule, meal, daily normal routine  [] Schedule toileting   [] Call family to support care  [] Out of Bed for meals  [] Ask Pharmacy for med re-timing wake & sleep [] Risk for Elopement   vs Wandering    [] Out of bed once per day  [] Belt alarm  [] Roll Belt teaching  [] Treatment contract  [] Monitored travel to garden [x] Impaired Mobility  r/t weakness    [x] Encourage food & drink intake  [x] Ambulate w/device - nargis steady   [] Egress training  [] Passive exercise  [] Med dose adjustment of meds that impair mobility  [] Consider PT/OT consult     [x] Risk for Agitation  related to: sleeplessness, anxiety    [] Hide/reinforce lines   [x] Assess & treat pain  [] Activity apron, towel folding, tasks w/hands  [] Soft music + Refer Music Therapy   [] Sleep schedule with weighted blanket  [] Give baby doll/mechanical animal  [] Refer Pet Therapy  [] Aromatherapy   [] Consider Seroquel dosing  [] TV on at night/quiet ambient  noise   Changeable  Symptoms Difficult to redirect  Somnolence   Fear  Scans environment  Repeatedly up w/out assist Dependent mobility  Cannot complete ADLs Combativeness  continuous picking at lines     Monitoring Tier 2. Do the patient's symptoms continue after 1 hr after nursing intervention:  [x] No  [] Yes - Virtual  has started, but considering weaning trial   [] Yes - Consider virtual      Monitoring Tier 3. Does the patient have 
Department of Orthopedic Surgery  Physician Assistant Progress Note      SUBJECTIVE  pod 1 ankle hardware removal and I and D        OBJECTIVE  Appropriate post operative pain.  Denies n/v  Up to the chair, oozing a little blood through splint/dressing  Hgb  10.2    Physical    Visit Vitals  /86   Pulse 87   Temp 98.1 °F (36.7 °C)   Resp 16   Ht 1.702 m (5' 7\")   Wt 102.1 kg (225 lb 1.4 oz)   SpO2 95%   BMI 35.25 kg/m²       CONSTITUTIONAL:  awake, alert, cooperative, no apparent distress, and appears stated age  MUSCULOSKELETAL:   Dressing intact, some bloody drainage    Musculoskeletal: Phillip's sign negative in bilateral lower extremities. Calves soft, supple, non-tender upon palpation or with passive stretch.     NEUROLOGIC:  Awake, alert, oriented to name, place and time.        ASSESSMENT AND PLAN      Post op   Oob with pt  Dvt pre primary team  Reinforce dressing PRN  NWB  
Dual skin assessment completed with AGNES Lind.  Discolored areas, blanching, noted to bilateral buttocks POA.  Some areas slower to arslan than others.  Education provided to patient ( present) regarding importance of turning regularly and offloading her bottom.  Patient verbalized understanding.  Denied pain when palpated area.  Wound care consult placed for bottom.       
End of Shift Note    Bedside shift change report given to AGNES Alvarado (oncoming nurse) by Nick Guzman RN (offgoing nurse).  Report included the following information SBAR, Kardex, Intake/Output, and MAR    Shift worked:  7a-7p     Shift summary and any significant changes:     -Pt is alert and orientation.  -Given pain med 2.5 mg oxycodone before PT.  -Pt is been voiding ,sent urine test to the lab.  -No BM ,given lashawn lax and colace.   -Given 2 dose of iv albumin.     Concerns for physician to address:       Zone phone for oncoming shift:   [phone]       Activity:     Number times ambulated in hallways past shift: 0  Number of times OOB to chair past shift: 0    Cardiac:   Cardiac Monitoring: No           Access:  Current line(s): PIV     Genitourinary:   Urinary status: voiding and external catheter    Respiratory:      Chronic home O2 use?: NO  Incentive spirometer at bedside: YES       GI:     Current diet:  ADULT DIET; Regular; 3 carb choices (45 gm/meal)  ADULT ORAL NUTRITION SUPPLEMENT; Breakfast, Lunch, Dinner; Diabetic Oral Supplement  Passing flatus: YES  Tolerating current diet: YES       Pain Management:   Patient states pain is manageable on current regimen: YES    Skin:     Interventions: float heels, increase time out of bed, and PT/OT consult    Patient Safety:  Fall Score:    Interventions: bed/chair alarm, assistive device (walker, cane. etc), gripper socks, and pt to call before getting OOB       Length of Stay:  Expected LOS: 8  Actual LOS: 5      Nick Guzman RN                           
End of Shift Note    Bedside shift change report given to AGNES Lind (oncoming nurse) by Jessica Tariq LPN (offgoing nurse).  Report included the following information SBAR, Kardex, and MAR    Shift worked: night     Shift summary and any significant changes:    VSS  Pt voiding via purewick, no BM last night  Pt turned q2h  Pt given Ativan and melatonin at night  Delirium screening conducted = total (0)  Uneventful night   Concerns for physician to address:  none     Zone phone for oncoming shift:   [phone]       Activity:     Number times ambulated in hallways past shift: 0  Number of times OOB to chair past shift: 0    Cardiac:   Cardiac Monitoring: No           Access:  Current line(s): PIV     Genitourinary:   Urinary status: voiding and external catheter    Respiratory:      Chronic home O2 use?: YES  Incentive spirometer at bedside: YES       GI:     Current diet:  ADULT DIET; Regular; 3 carb choices (45 gm/meal)  ADULT ORAL NUTRITION SUPPLEMENT; Breakfast, Lunch, Dinner; Diabetic Oral Supplement  Passing flatus: YES  Tolerating current diet: YES       Pain Management:   Patient states pain is manageable on current regimen: YES    Skin:     Interventions: turn team, float heels, and PT/OT consult    Patient Safety:  Fall Score:    Interventions: bed/chair alarm, assistive device (walker, cane. etc), gripper socks, and pt to call before getting OOB       Length of Stay:  Expected LOS: 9  Actual LOS: 9      Jessica Tariq LPN                           
End of Shift Note    Bedside shift change report given to AGNES Lind (oncoming nurse) by Verna Fernandes RN (offgoing nurse).  Report included the following information SBAR, Kardex, Intake/Output, and MAR    Shift worked:  Night     Shift summary and any significant changes:    POCT Glucose ,elevation of the operated limb,has pure wick     Concerns for physician to address:       Zone phone for oncoming shift:   [phone]       Activity:     Number times ambulated in hallways past shift: 0  Number of times OOB to chair past shift: 0    Cardiac:   Cardiac Monitoring: Yes           Access:  Current line(s): PIV     Genitourinary:   Urinary status: voiding and external catheter    Respiratory:      Chronic home O2 use?: NO  Incentive spirometer at bedside: YES       GI:     Current diet:  ADULT DIET; Regular; 3 carb choices (45 gm/meal)  Passing flatus: YES  Tolerating current diet: YES       Pain Management:   Patient states pain is manageable on current regimen: YES    Skin:     Interventions: float heels, increase time out of bed, PT/OT consult, limit briefs, internal/external urinary devices, and nutritional support    Patient Safety:  Fall Score:    Interventions: bed/chair alarm, assistive device (walker, cane. etc), gripper socks, pt to call before getting OOB, stay with me (per policy), and gait belt       Length of Stay:  Expected LOS: 7  Actual LOS: 3      Verna Fernandes RN                           
End of Shift Note    Bedside shift change report given to AGNES Lind (oncoming nurse) by Yared Odom RN (offgoing nurse).  Report included the following information SBAR, ED Summary, Intake/Output, MAR, Recent Results, and Cardiac Rhythm AV Paced    Shift worked:  night     Shift summary and any significant changes:     VSS, pt c/o pain, controlled with oxycodone, IV zofran for c/o n/v, pt voiding with purewick while in bed, plan for L ankle I&D with MTT ORIF removal performed by Dr. Chan scheduled at 1600 today.   Concerns for physician to address:  See above   Zone phone for oncoming shift:        Activity:     Number times ambulated in hallways past shift: 0  Number of times OOB to chair past shift: 0    Cardiac:   Cardiac Monitoring: Yes           Access:  Current line(s): PIV     Genitourinary:   Urinary status: voiding and external catheter    Respiratory:      Chronic home O2 use?: NO  Incentive spirometer at bedside: N/A       GI:     Current diet:  Diet NPO  Passing flatus: YES  Tolerating current diet: YES       Pain Management:   Patient states pain is manageable on current regimen: YES    Skin:     Interventions: float heels and internal/external urinary devices    Patient Safety:  Fall Score:    Interventions: bed/chair alarm, assistive device (walker, cane. etc), gripper socks, pt to call before getting OOB, and stay with me (per policy)       Length of Stay:  Expected LOS: 3  Actual LOS: 2      Yared Odom RN                            
End of Shift Note    Bedside shift change report given to AGNES Lind(oncoming nurse) by Jessica Tariq LPN (offgoing nurse).  Report included the following information SBAR, Kardex, Intake/Output, and MAR    Shift worked:  Night     Shift summary and any significant changes:    -VSS  On purewick  Pain controlled by tylenol  Complaining of neuropathy pains overnight  Labs in am    Concerns for physician to address:       Zone phone for oncoming shift:   [phone]       Activity:     Number times ambulated in hallways past shift: 0  Number of times OOB to chair past shift: 0    Cardiac:   Cardiac Monitoring: Yes           Access:  Current line(s): PIV     Genitourinary:   Urinary status: voiding and external catheter    Respiratory:      Chronic home O2 use?: NO  Incentive spirometer at bedside: YES       GI:     Current diet:  ADULT DIET; Regular; 3 carb choices (45 gm/meal)  ADULT ORAL NUTRITION SUPPLEMENT; Breakfast, Lunch, Dinner; Diabetic Oral Supplement  Passing flatus: YES  Tolerating current diet: YES       Pain Management:   Patient states pain is manageable on current regimen: YES    Skin:     Interventions: float heels, increase time out of bed, PT/OT consult, limit briefs, internal/external urinary devices, and nutritional support    Patient Safety:  Fall Score:    Interventions: bed/chair alarm, assistive device (walker, cane. etc), gripper socks, pt to call before getting OOB, stay with me (per policy), and gait belt       Length of Stay:  Expected LOS: 7  Actual LOS: 4      Jessica Tariq LPN                           
End of Shift Note    Bedside shift change report given to AGNES Olsen (oncoming nurse) by Nick Guzman RN (offgoing nurse).  Report included the following information SBAR, Kardex, Intake/Output, and MAR    Shift worked:  7A-7P     Shift summary and any significant changes:     Pt is alert and orientation.no pain complain during my shift. Pt have a big BM after soap amisha enema. Pt left leg need to be elevated.Iv antibiotics change to po med.     Concerns for physician to address:       Zone phone for oncoming shift:   [phone]       Activity:     Number times ambulated in hallways past shift: 0  Number of times OOB to chair past shift: 0    Cardiac:   Cardiac Monitoring: No           Access:  Current line(s): PIV     Genitourinary:   Urinary status: voiding and external catheter    Respiratory:      Chronic home O2 use?: NO  Incentive spirometer at bedside: YES       GI:     Current diet:  ADULT DIET; Regular; 3 carb choices (45 gm/meal)  ADULT ORAL NUTRITION SUPPLEMENT; Breakfast, Lunch, Dinner; Diabetic Oral Supplement  Passing flatus: YES  Tolerating current diet: YES       Pain Management:   Patient states pain is manageable on current regimen: YES    Skin:     Interventions: increase time out of bed, foam dressing, PT/OT consult, and internal/external urinary devices    Patient Safety:  Fall Score:    Interventions: bed/chair alarm, assistive device (walker, cane. etc), gripper socks, and pt to call before getting OOB       Length of Stay:  Expected LOS: 8  Actual LOS: 6      Nick Guzman RN                           
End of Shift Note    Bedside shift change report given to Bridget (oncoming nurse) by Malissa Cadet RN (offgoing nurse).  Report included the following information SBAR, Kardex, and MAR    Shift worked:  NIGHT     Shift summary and any significant changes:     Pt anxious voiding via purewick ,pain mx with oxycodone       none     Zone phone for oncoming shift:   [phone]       Activity:     Number times ambulated in hallways past shift: 0  Number of times OOB to chair past shift: 0    Cardiac:   Cardiac Monitoring: No           Access:  Current line(s): PIV     Genitourinary:   Urinary status: voiding and external catheter    Respiratory:      Chronic home O2 use?: NO  Incentive spirometer at bedside: YES       GI:     Current diet:  ADULT DIET; Regular; 3 carb choices (45 gm/meal)  ADULT ORAL NUTRITION SUPPLEMENT; Breakfast, Lunch, Dinner; Diabetic Oral Supplement  Passing flatus: YES  Tolerating current diet: YES       Pain Management:   Patient states pain is manageable on current regimen: YES    Skin:     Interventions: turn team, float heels, and PT/OT consult    Patient Safety:  Fall Score:    Interventions: bed/chair alarm, assistive device (walker, cane. etc), gripper socks, and pt to call before getting OOB       Length of Stay:  Expected LOS: 8  Actual LOS: 7      Malissa Cadet RN                           
End of Shift Note    Bedside shift change report given to KI Charles (oncoming nurse) by Yared Odom RN (offgoing nurse).  Report included the following information SBAR, ED Summary, Intake/Output, MAR, Recent Results, and Cardiac Rhythm AV Paced    Shift worked:  night     Shift summary and any significant changes:     VSS, pt c/o pain, controlled with oxycodone, pt voiding with purewick while in bed, pt came from ED late lastnight dt increased swelling/pain to LLE.   Concerns for physician to address:  Ortho consulted rt previous surgery and hardware placed to effected extremity.      Zone phone for oncoming shift:   [phone]       Activity:     Number times ambulated in hallways past shift: 0  Number of times OOB to chair past shift: 0    Cardiac:   Cardiac Monitoring: Yes           Access:  Current line(s): PIV     Genitourinary:   Urinary status: voiding and external catheter    Respiratory:      Chronic home O2 use?: NO  Incentive spirometer at bedside: N/A       GI:     Current diet:  ADULT DIET; Regular; 4 carb choices (60 gm/meal); Low Fat/Low Chol/High Fiber/KAREEM  Passing flatus: YES  Tolerating current diet: YES       Pain Management:   Patient states pain is manageable on current regimen: YES    Skin:     Interventions: float heels and internal/external urinary devices    Patient Safety:  Fall Score:    Interventions: bed/chair alarm, assistive device (walker, cane. etc), gripper socks, pt to call before getting OOB, and stay with me (per policy)       Length of Stay:  Expected LOS: 2  Actual LOS: 1      Yared Odom RN                            
End of Shift Note    Bedside shift change report given to Lelo (oncoming nurse) by Malissa Cadet RN (offgoing nurse).  Report included the following information SBAR, Kardex, and MAR    Shift worked: night     Shift summary and any significant changes:    Pt has been anxious voiding via purewick at times removing it,had BM twice      Concerns for physician to address:  none     Zone phone for oncoming shift:   [phone]       Activity:     Number times ambulated in hallways past shift: 0  Number of times OOB to chair past shift: 0    Cardiac:   Cardiac Monitoring: No           Access:  Current line(s): PIV     Genitourinary:   Urinary status: voiding and external catheter    Respiratory:      Chronic home O2 use?: YES  Incentive spirometer at bedside: YES       GI:     Current diet:  ADULT DIET; Regular; 3 carb choices (45 gm/meal)  ADULT ORAL NUTRITION SUPPLEMENT; Breakfast, Lunch, Dinner; Diabetic Oral Supplement  Passing flatus: YES  Tolerating current diet: YES       Pain Management:   Patient states pain is manageable on current regimen: YES    Skin:     Interventions: turn team, float heels, and PT/OT consult    Patient Safety:  Fall Score:    Interventions: bed/chair alarm, assistive device (walker, cane. etc), gripper socks, and pt to call before getting OOB       Length of Stay:  Expected LOS: 8  Actual LOS: 8      Malissa Cadet RN                           
ID  D/w Dr. Hernandez yesterday's conversation with patient and spouse.  Patient & spouse do not want to change to ceftaroline IV.  They would like to be discharged on doxycycline.  (See yesterday's note for details).  
ID weekend cross coverage.  Chart reviewed.  Surgical cultures growing MRSA.  Patient cannot tolerate daptomycin and has developed HILARY on vancomycin.  Vancomycin now on hold.  Antibiotics changed to linezolid however she has lost IV access.  Will change antibiotics to p.o. linezolid.  Long-term antibiotic options limited.  Will ask microbiology lab to check sensitivities to ceftaroline.  Attempted to contact patient's  to clarify daptomycin allergy however there was no answer  
Infectious Disease progress        Impression    Fever Tmax 102.7  Resolved    Left lower extremity cellulitis  Acute OM involving tibia, talus, calcaneus s/p arthrodesis, ORIF & removal of hardware  S/p LLE retrograde intramedullary nail and screws removed, deep bone debridement and incision  Irrigation, debridement involving lateral talus, posterior calcaneum, tibia 10/23  Multiple cultures taken.  Initial culture+ for scant/few MRSA  Blood cultures 10/21-no growth.    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 per patient and spouse request  Plan was for Ceftaroline once renal function  stabilized  Patient and spouse declined antibiotic.  Ceftaroline may predispose to renal issues  Which are inclusive but not limited to interstitial nephritis and HILARY  Adequate fluids, daily probiotic  Antibiotic for total 6 weeks from surgery 10/23-12/3    May DC from ID standpoint  Antimicrobial orders   -Doxycycline 100 mg p.o. twice daily end date 12/3  - CBC, CMP-, ERr , CRP 11/13 or  after  -Fax reports to [phone], call with critical labs  at [phone]  -Encourage adequate fluids, daily probiotic/yogurt  -If line malfunction occurs and home health cannot reposition  please send patient to ED immediately  -ID follow-up -11/19 at 3:30 PM-in person or virtual  - If persistent side effects occur stop antibiotic and call-ID/PCP.  Patient advised to stop antibiotic and call if persistent 
Infectious Disease progress        Impression    Fever Tmax 102.7  Resolved    Left lower extremity cellulitis  Acute OM involving tibia, talus, calcaneus s/p arthrodesis, ORIF & removal of hardware  S/p LLE retrograde intramedullary nail and screws removed, deep bone debridement and incision  Irrigation, debridement involving lateral talus, posterior calcaneum, tibia 10/23  Multiple cultures taken.  Initial culture+ for scant/few MRSA  Blood cultures 10/21-no growth.    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  S/p ORIF 3/23, wound cultures+ for MRSA 4/3  S/p hardware removal 4/30    CKD 3  Cr improving 1.88      Diabetes type 2  A1c 5.8    CAD  Diastolic CHF  Continue home meds    Atrial fibrillation  Watchman present  Retained RV pacing lead+    Hypertension HLD  Continue home meds    COPD, LEELA on CPAP  Chronic hypoxic respiratory failure  Continue O2, albuterol    Obesity  BMI 35.81    ESR 63  CRP 7.58    Plan    Continue Zyvox  Plan was for Ceftaroline as renal function has stabilized  D/w patient and spouse  by phone  D/w patient and spouse all side effects of linezolid-which are inclusive but not limited   to the following: GI, vomiting, myelosuppression-anemia, leukopenia, pancytopenia, thrombocytopenia  Peripheral and optic neuropathy, serotonin syndrome.  Advised that patient would not be safe on linezolid for> 2 weeks & most definitely not for extended  course of treatment.  Ceftaroline would be a safer option for treatment of OM.  Side effects are inclusive but not limited to-nausea vomiting, diarrhea, C. difficile diarrhea ,neutropenia.  Renal impairment  & interstitial nephritis could occur with  extended course of therapy.  Patient and spouse immediately declined ceftaroline.  Patient's spouse Wesley requested doxycycline.  Patient has tolerated it before & they do not want to be on any antibiotic that would cause renal 
Infectious Disease progress        Impression    Fever Tmax 102.7  Resolved    Left lower extremity cellulitis  Acute OM involving tibia, talus, calcaneus s/p arthrodesis, ORIF & removal of hardware  S/p LLE retrograde intramedullary nail and screws removed, deep bone debridement and incision  Irrigation, debridement involving lateral talus, posterior calcaneum, tibia 10/23  Multiple cultures taken.  Initial culture+ for scant/few MRSA  Blood cultures 10/21-no growth.    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  S/p ORIF 3/23, wound cultures+ for MRSA 4/3  S/p hardware removal 4/30     CKD 3  Cr 1.90    Diabetes type 2  A1c 5.8    CAD  Diastolic CHF  Continue home meds    Atrial fibrillation  Watchman present  Retained RV pacing lead+    Hypertension HLD  Continue home meds    COPD, LEELA on CPAP  Chronic hypoxic respiratory failure  Continue O2, albuterol    Obesity  BMI 35.81    ESR 63  CRP 7.58    Plan    Continue Zyvox  Plan is for Ceftaroline once renal function stabilizes  Patient cannot tolerate daptomycin  S/p increase in  creatinine while on vancomycin  Adequate fluids, daily probiotic  Adequate blood sugar control.    D/w patient limited options for MRSA treatment  Patient is aware and voiced understanding    Abx  Vancomycin-10/21  Cefepime 10/21    Extensive review of chart notes, labs, imaging, cultures done  Additionally review of done: Recent reports-Labs, cultures, imaging  D/w -hospitalist, RN  Patient is seen for management of deep infection-S/p removal of all hardware.  Marilee Oakes is a 74 y.o. female with past medical history A-fib on Eliquis, CAD, CHF, ESRD on dialysis,, DM, hypertension, COPD on 2 L at baseline, GERD, bimalleolar ankle fracture, MRSA bacteremia and MRSA deep wound infection at site of bimalleolar left ankle fracture.  Patient is well-known to ID service.  Patient presented with left foot pain x 1 
Lab called and reported a critical lab reporting that the patient tested positive for MRSA. Attending provider notified.  
Nephrology Progress Note  [facility] / [place] [facility]  [address]  [place] [address]  Phone - [phone]  Fax - [phone]                 Patient: Marilee Oakes                     YOB: 1950        Date- 10/28/2024                                     Admit Date: 10/21/2024   CC: Follow up for HILARY on CKD          IMPRESSION & PLAN:   HILARY stage I(secondary to labile hemodynamics, ongoing diuretics, volume depletion, concomitant Bumex use)  CKD stage IIIb(baseline creatinine 1.5-1.7)(was briefly on hemodialysis at [facility], now recovered)  Hypokalemia  Left lower extremity cellulitis  Left ankle osteomyelitis(status post left ankle ORIF, ankle deep hardware removal, TTC retrograde nail removal)  History of diastolic heart failure  CAD  Hypertension  Chronic hypoxic respiratory failure from COPD  Acute blood loss anemia      PLAN-  Creatinine remained stable and at baseline  Will restart Bumex 1 mg twice daily  Noticed ID recommendations  Renal ultrasound shows no hydro  Spoke to  at bedside  Do not restart Jardiance as patient has history of recurrent UTIs  Check labs daily     Subjective:  Interval History:   -Seen and examined today  -Getting PT done    Objective:   Vitals:    10/27/24 1912 10/27/24 1935 10/28/24 0644 10/28/24 0800   BP:  111/83  (!) 140/76   Pulse: 87 93  80   Resp: 18 18 18 17   Temp:  98.8 °F (37.1 °C)  97.7 °F (36.5 °C)   TempSrc:  Oral  Oral   SpO2: 97% 91%  95%   Weight:       Height:          I/O last 3 completed shifts:  In: -   Out: 1400 [Urine:1400]  No intake/output data recorded.      Physical exam:    GEN: NAD  NECK- no mass  RESP: No wheezing, decreased BS b/l  CVS: S1,S2  RRR  NEURO: Normal speech, Non focal  EXT: No Edema   PSYCH: Normal Mood    Chart reviewed.         Pertinent Notes reviewed.     Data Review :  Lab Results   Component Value Date/Time     
Nephrology Progress Note  [facility] / [place] [facility]  [address]  [place] [address]  Phone - [phone]  Fax - [phone]                 Patient: Marilee Oakes                     YOB: 1950        Date- 10/29/2024                                     Admit Date: 10/21/2024   CC: Follow up for HILARY on CKD          IMPRESSION & PLAN:   HILARY stage I(secondary to labile hemodynamics, ongoing diuretics, volume depletion, concomitant Bumex use)  CKD stage IIIb(baseline creatinine 1.5-1.7)(was briefly on hemodialysis at [facility], now recovered)  Hypokalemia  Left lower extremity cellulitis  Left ankle osteomyelitis(status post left ankle ORIF, ankle deep hardware removal, TTC retrograde nail removal)  History of diastolic heart failure  CAD  Hypertension  Chronic hypoxic respiratory failure from COPD  Acute blood loss anemia      PLAN-  Creatinine labile  Order labs for today  Continue Bumex 1 mg daily  Renal ultrasound shows no hydro  Spoke to  at bedside  Do not restart Jardiance as patient has history of recurrent UTIs  Noted ID recommendations     Subjective:  Interval History:   -Seen and examined today  -Denies any shortness of breath    Objective:   Vitals:    10/28/24 1500 10/28/24 2217 10/29/24 0547 10/29/24 0752   BP: (!) 178/91 134/73 (!) 153/80 (!) 160/91   Pulse: 81 82 84 94   Resp:  18 18 18   Temp: 98.2 °F (36.8 °C) 97.9 °F (36.6 °C) 97.9 °F (36.6 °C) 97.7 °F (36.5 °C)   TempSrc: Oral Oral Oral Axillary   SpO2:  93% 100% 95%   Weight:       Height:          I/O last 3 completed shifts:  In: -   Out: 850 [Urine:850]  No intake/output data recorded.      Physical exam:    GEN: NAD  NECK- no mass  RESP: No wheezing, decreased BS b/l  CVS: S1,S2  RRR  NEURO: Normal speech, Non focal  EXT: Surgical dressing      Chart reviewed.         Pertinent Notes reviewed.     Data Review :  Lab Results 
Notified Dr. Siddiqui that the patients potassium level was 2.8. Awaiting provider orders.  
Notified physician that patient is complaining of upper epigastric pain along with dry heaving. She has not had a bowel movement in a week. Yesterday she recieved miralax and senakot. Bowel sounds active, VSS, Can she get an order for an enema?   Also, patient refused lab draws this morning, her IV infiltrated during iv antibiotics and is refusing placement of a new one unless with ultrasound placement. Awaiting response from physician.   
Nutrition Assessment     Type and Reason for Visit: Initial, RD Nutrition Re-Screen/LOS    Nutrition Recommendations/Plan:   Recommend increasing diet to 4 CHO to better meet estimated nutrition needs.  Consider discontinuing Glucerna Shake TID as patient is not consuming.  To monitor nutrition intake/tolerance, labs, weight, and plan of care during admission.     Malnutrition Assessment:  Malnutrition Status: Insufficient data    Nutrition Assessment:  73 yo admitted for cellulitis of LLE. PMH includes: CKD3, diastolic CHF, COPD, HTN, HLD, DM. Per discussion with patient's  through hospital phone, patient with good and normal appetite PTA and during admission, however noted decreased appetite d/t medication. Patient is not consuming ONS d/t taste preference. Per discussion, patient's usual weight is ~244#, and denied any recent weight changes. Patient does not meet criteria for malnutrition a this time. No emesis noted, and last BM documented on 10/30.    Estimated Daily Nutrient Needs:  Energy (kcal):  3170-8034 kcals/day (MSJ 1-1.2 PAL for BMI >30) Weight Used for Energy Requirements: Current     Protein (g):   gm/day (0.8-1 g/kg for BMI >30) Weight Used for Protein Requirements: Current        Fluid (ml/day):  or per MD Method Used for Fluid Requirements: 1 ml/kcal    Nutrition Related Findings:   Non-pitting edema LLE RLE. Meds include: culturelle, glycolax, senokot.  Labs include: K+ 3.4 mmol/L (L),  MG/dL (H), BUN 27 MG/dL (H), CR 1.53 MG/dL (H), GFR 36 ML/MIN/1.73 m2 (L). Wound Type: Multiple, Surgical Incision    Current Nutrition Therapies:    ADULT DIET; Regular; 3 carb choices (45 gm/meal)  ADULT ORAL NUTRITION SUPPLEMENT; Breakfast, Lunch, Dinner; Diabetic Oral Supplement    Anthropometric Measures:  Height: 170.2 cm (5' 7.01\")  Current Body Wt: 102.1 kg (225 lb 1.4 oz)   BMI: 35.2    Nutrition Diagnosis:   No nutrition diagnosis at this time     Nutrition Interventions:   Food 
ORTHO - Progress Note  Post Op day: 2 Days Post-Op    Marilee Oakes     [id number]  female    74 y.o.    1950    Admit date:10/21/2024  Procedures:Procedure(s):  LEFT ANKLE INCISION AND DRAINAGE AND REMOVAL OF MTT NAIL  Surgeon:Surgeons and Role:     * Maame Chan MD - Primary        SUBJECTIVE:     Marilee Oakes is a 74 y.o. female resting in the bed.  Patient has complaints of appropriate post-op pain, tolerating PO pain medications. \" My Feet are cold.\"     at bedside.    Denies F/C, nausea, vomiting, dizziness, lightheadedness, chest pain, or shortness of breath.    OBJECTIVE:       Physical Exam:  General: alert, cooperative, no distress.   Gastrointestinal:  non-distended .    Cardiovascular: Brisk cap refill in all distal extremities   Genitourinary:Purewick  Respiratory: No respiratory distress   Neurological:Neurovascular exam within normal limits.     Senstion intact: LE bilat.    Motor: + DF/PF/EHL.   Musculoskeletal: Phillip's sign negative in right lower extremities.  Calves soft, supple, non-tender upon palpation or with passive stretch on right.  No sign of DVT   Dressing/Wound:  Splint LLE Clean, dry and intact with mild dry drainage noted of the medial aspect of foot. No significant erythema or swelling.    Vital Signs:       Patient Vitals for the past 8 hrs:   BP Temp Temp src Pulse Resp SpO2   10/25/24 0826 -- -- -- 82 18 92 %   10/25/24 0700 (!) 150/81 98.2 °F (36.8 °C) Oral 95 18 91 %   10/25/24 0327 (!) 109/58 97.9 °F (36.6 °C) Oral 83 19 96 %                                          Temp (24hrs), Av.2 °F (36.8 °C), Min:97.9 °F (36.6 °C), Max:99 °F (37.2 °C)    Date 10/25/24 0000 - 10/25/24 235   Shift 4187-6834 3713-0941 0053-2099 24 Hour Total   INTAKE   Shift Total(mL/kg)       OUTPUT   Urine(mL/kg/hr) 600(0.7)   600   Shift Total(mL/kg) 600(5.9)   600(5.9)   Weight (kg) 102.1 102.1 102.1 102.1     Labs:        Recent Labs     10/25/24  0621   HCT 32.2*   HGB 10.2* 
ORTHO - Progress Note  Post Op day: 3 Days Post-Op    Marilee Oakes     [id number]  female    74 y.o.    1950    Admit date:10/21/2024  Procedures:Procedure(s):  LEFT ANKLE INCISION AND DRAINAGE AND REMOVAL OF MTT NAIL  Surgeon:Surgeons and Role:     * Maame Chan MD - Primary        SUBJECTIVE:     Marilee Oakes is a 74 y.o. female resting in the bed.  Patient has complaints of appropriate post-op pain, tolerating PO pain medications. No new complaints today     at bedside.    Denies F/C, nausea, vomiting, dizziness, lightheadedness, chest pain, or shortness of breath.    OBJECTIVE:       Physical Exam:  General: alert, cooperative, no distress.   Gastrointestinal:  non-distended .    Cardiovascular: Brisk cap refill in all distal extremities   Genitourinary:Purewick  Respiratory: No respiratory distress   Neurological:Neurovascular exam within normal limits.     Senstion intact: LE bilat.    Motor: + DF/PF/EHL.   Musculoskeletal: Phillip's sign negative in right lower extremities.  Calves soft, supple, non-tender upon palpation or with passive stretch on right.  No sign of DVT   Dressing/Wound:  Splint LLE Clean, dry and intact with mild dry drainage noted of the medial aspect of foot. No new drainage noted.  No significant erythema or swelling.    Vital Signs:       Patient Vitals for the past 8 hrs:   BP Temp Temp src Pulse Resp SpO2   10/26/24 1015 125/75 97.7 °F (36.5 °C) Oral 91 17 95 %   10/26/24 0334 120/63 -- Oral 80 18 94 %                                          Temp (24hrs), Av.9 °F (36.6 °C), Min:97.7 °F (36.5 °C), Max:98.4 °F (36.9 °C)        Labs:        Recent Labs     10/25/24  0621   HCT 32.2*   HGB 10.2*     PT/OT:              ASSESSMENT / PLAN:   Principal Problem:    Cellulitis of left lower extremity  Active Problems:    Stage 3a chronic kidney disease (HCC)    Recurrent infections    Congestive heart failure (HCC)    MRSA bacteremia    MRSA infection    Acute 
Occupational Therapy     Attempted OT session. Upon entry to ortho unit patient is audibly moaning and reporting high pain levels. She had just declined to get onto stretcher to go to US and RN advises to hold OT secondary to pain. Will defer and continue to follow.     Dustin Sandhu, OTLUIS ALBERTO, OTR/L  
Pharmacy Antimicrobial Kinetic Dosing    Indication for Antimicrobials: SSTI     Current Regimen of Each Antimicrobial:  Vancomycin 2.5 g IV X1, then 500 mg iv every 12 hr Start Date 10/21; Day # 3  Cefepime 2 gm iv every 24 hr; Start Date 10/21; Day # 3      Previous Antimicrobial Therapy:      Goal Level: Vancomycin -600    Date Dose & Interval Measured (mcg/mL) Predicted AUC 24-48 Predicted AUC 24,ss   10/23 0600 500 mg q 12 h 21.9 550 552                   Significant Cultures:   10/21 blood - pending    Labs:  Recent Labs     Units 10/23/24  0549 10/22/24  0307 10/21/24  1948   CREATININE MG/DL 1.45* 1.51* 1.70*   BUN MG/DL 18 22* 24*   PROCAL ng/mL  --   --  <0.05   WBC K/uL  --  8.9 9.9     Temp (24hrs), Av.5 °F (36.9 °C), Min:97.9 °F (36.6 °C), Max:99.1 °F (37.3 °C)      Conditions for Dosing Consideration:     Creatinine Clearance (mL/min): Estimated Creatinine Clearance: 43 mL/min (A) (based on SCr of 1.45 mg/dL (H)).       Impression/Plan:   Continue with vancomycin  500 mg q 12 h  Predicted CUT55-73 = 550, Predicted AUC24,ss = 552  Scr trending down  Antimicrobial stop date 7 days     Pharmacy will follow daily and adjust medications as appropriate for renal function and/or serum levels.    Thank you,  Zhen Hudson, [facility]        
Pharmacy Antimicrobial Kinetic Dosing    Indication for Antimicrobials: SSTI     Current Regimen of Each Antimicrobial:  Vancomycin 2.5 g IV X1, then 500 mg iv every 12 hr Start Date 10/21; Day # 4  Cefepime 2 gm iv every 24 hr; Start Date 10/21; Day # 4      Previous Antimicrobial Therapy:      Goal Level: Vancomycin -600    Date Dose & Interval Measured (mcg/mL) Predicted AUC 24-48 Predicted AUC 24,ss   10/23 0600 500 mg q 12 h 21.9 550 552                   Significant Cultures:   10/21 blood - pending    Labs:  Recent Labs     Units 10/24/24  0641 10/23/24  0549 10/22/24  0307 10/21/24  1948   CREATININE MG/DL 1.59* 1.45* 1.51* 1.70*   BUN MG/DL 25* 18 22* 24*   PROCAL ng/mL  --   --   --  <0.05   WBC K/uL 8.8  --  8.9 9.9     Temp (24hrs), Av.6 °F (36.4 °C), Min:97.3 °F (36.3 °C), Max:98.3 °F (36.8 °C)      Conditions for Dosing Consideration:     Creatinine Clearance (mL/min): Estimated Creatinine Clearance: 39 mL/min (A) (based on SCr of 1.59 mg/dL (H)).       Impression/Plan:   Continue with vancomycin  500 mg q 12 h  Predicted DLT01-28 = 550, Predicted AUC24,ss = 552  Scr trending up  Antimicrobial stop date 7 days     Pharmacy will follow daily and adjust medications as appropriate for renal function and/or serum levels.    Thank you,  Zhen Hudson [facility]          
Physical Therapy  Attempted PT session.  Upon entry to ortho unit patient is audibly moaning and reporting high pain levels.  She had just declined to get onto stretcher to go to US and RN advises to hold PT secondary to pain.  Will defer and continue to follow.  Mai Valero, PT, DPT    
Physical Therapy  PT evaluation; full note to follow.  Patient requiring min assist for most mobility but has quite a bit of difficulty with transfers due to NWB status.  Recommend moderate intensity PT with good support at discharge.  
Pt is having occasional episodes of unrelieved n/v. IV zofran given, non pharmacological interventions tried and still unrelieved. On-call provider was notified to see if there is anything else we can do to try and relieve these episode of n/v.      0248 - Pt still having c/o n/v, Provider notified and received permission to administer zofran early.  
Renal function improving  Cont present care  Will check back tomorrow  Call with any questions  
Spiritual Health History and Assessment/Progress Note  [facility]    Initial Encounter,  , Life Adjustments, Adjustment to illness,      Name: Marilee Okaes MRN: [id number]    Age: 74 y.o.     Sex: female   Language: English   Anglican: Sabianist   Cellulitis of left lower leg     Date: 10/22/2024            Total Time Calculated: 19 min              Spiritual Assessment began in [facility] ORTHOPEDICS        Referral/Consult From: Rounding   Encounter Overview/Reason: Initial Encounter  Service Provided For: Patient and family together    Hemalatha, Belief, Meaning:   Patient is connected with a hemalatha tradition or spiritual practice and has beliefs or practices that help with coping during difficult times  Family/Friends have beliefs or practices that help with coping during difficult times      Importance and Influence:  Patient has spiritual/personal beliefs that influence decisions regarding their health  Family/Friends have spiritual/personal beliefs that influence decisions regarding the patient's health    Community:  Patient feels well-supported. Support system includes: Spouse/Partner, Children, Hemalatha Community, Friends, and Extended family  Family/Friends feel well-supported. Support system includes: Children, Hemalatha Community, Friends, and Extended family    Assessment and Plan of Care:     Patient Interventions include: Facilitated expression of thoughts and feelings, Explored spiritual coping/struggle/distress, Affirmed coping skills/support systems, and Facilitated life review and/ or legacy  Family/Friends Interventions include: Facilitated expression of thoughts and feelings, Explored spiritual coping/struggle/distress, Affirmed coping skills/support systems, and Facilitated life review and/or legacy    Patient Plan of Care: No spiritual needs identified for follow-up and Spiritual Care available upon further referral  Family/Friends Plan of Care: No spiritual needs identified for 
TRANSFER - IN REPORT:    Verbal report received from AGNES Recio on Marilee Oakes  being received from ED for routine progression of patient care      Report consisted of patient's Situation, Background, Assessment and   Recommendations(SBAR).     Information from the following report(s) Nurse Handoff Report, ED Encounter Summary, ED SBAR, Adult Overview, Intake/Output, and MAR was reviewed with the receiving nurse.    Opportunity for questions and clarification was provided.      Assessment completed upon patient's arrival to unit and care assumed.    
Tried the patient's vein to draw labs and used even vein finder to no avail.[facility] and requested they assist me and the staff said they will try send someone to assist me.patient has pending Basic metabolic panel and CBC with auto differential.Patient updated on the plan and is in agreement      6am  Managed to obtain the blood sample for the required test.  
Prophylaxis: Protonix  Baseline: Home with assistance    Subjective:     Chief Complaint / Reason for Physician Visit  \"Follow-up for cellulitis.\"  Patient continues to c/o constipation.           Objective:     VITALS:   Last 24hrs VS reviewed since prior progress note. Most recent are:  Patient Vitals for the past 24 hrs:   BP Temp Temp src Pulse Resp SpO2   10/26/24 1015 125/75 97.7 °F (36.5 °C) Oral 91 17 95 %   10/26/24 0334 120/63 -- Oral 80 18 94 %   10/25/24 2349 107/74 97.7 °F (36.5 °C) Oral 79 18 94 %   10/25/24 1948 (!) 108/53 98.4 °F (36.9 °C) Oral 80 17 92 %   10/25/24 1918 -- -- -- 87 16 91 %   10/25/24 1756 -- -- -- -- 17 --         Intake/Output Summary (Last 24 hours) at 10/26/2024 1318  Last data filed at 10/25/2024 1948  Gross per 24 hour   Intake --   Output 550 ml   Net -550 ml        I had a face to face encounter and independently examined this patient on 10/26/2024, as outlined below:  PHYSICAL EXAM:  General: Alert, cooperative  EENT:  EOMI. Anicteric sclerae.  Resp:  CTA bilaterally, no wheezing or rales.  No accessory muscle use  CV:  Regular  rhythm, pedal edema bilaterally  GI:  Soft, Non distended, Non tender.  +Bowel sounds  Neurologic:  Alert and oriented X 3, normal speech,   Psych:   Good insight. Not anxious nor agitated  Skin:  No rashes.  No jaundice  Extremities: dressing on left lower extremity    Reviewed most current lab test results and cultures  YES  Reviewed most current radiology test results   YES  Review and summation of old records today    NO  Reviewed patient's current orders and MAR    YES  PMH/SH reviewed - no change compared to H&P  ________________________________________________________________________  ______________  Kathy Siddiqui MD     Procedures: see electronic medical records for all procedures/Xrays and details which were not copied into this note but were reviewed prior to creation of Plan.      LABS:  I reviewed today's most current labs and imaging 
(CULTURELLE) capsule 1 capsule  1 capsule Oral Daily with breakfast    linezolid (ZYVOX) tablet 600 mg  600 mg Oral 2 times per day    sennosides-docusate sodium (SENOKOT-S) 8.6-50 MG tablet 2 tablet  2 tablet Oral Daily    polyethylene glycol (GLYCOLAX) packet 17 g  17 g Oral Daily    miconazole (MICOTIN) 2 % cream   Topical BID    aspirin chewable tablet 81 mg  81 mg Oral Daily    enoxaparin Sodium (LOVENOX) injection 30 mg  30 mg SubCUTAneous BID    sodium chloride flush 0.9 % injection 5-40 mL  5-40 mL IntraVENous 2 times per day    sodium chloride flush 0.9 % injection 5-40 mL  5-40 mL IntraVENous PRN    ondansetron (ZOFRAN-ODT) disintegrating tablet 4 mg  4 mg Oral Q8H PRN    Or    ondansetron (ZOFRAN) injection 4 mg  4 mg IntraVENous Q6H PRN    balsum peru-castor oil (VENELEX) ointment   Topical BID    LORazepam (ATIVAN) tablet 0.5 mg  0.5 mg Oral Q12H PRN    haloperidol lactate (HALDOL) injection 1 mg  1 mg IntraMUSCular Q6H PRN    arformoterol 15 mcg-budesonide 0.5 mg neb solution   Nebulization BID RT    carvedilol (COREG) tablet 25 mg  25 mg Oral BID with meals    isosorbide mononitrate (IMDUR) extended release tablet 60 mg  60 mg Oral Daily    pantoprazole (PROTONIX) tablet 40 mg  40 mg Oral QAM AC    pregabalin (LYRICA) capsule 75 mg  75 mg Oral Daily    sodium chloride flush 0.9 % injection 5-40 mL  5-40 mL IntraVENous 2 times per day    sodium chloride flush 0.9 % injection 5-40 mL  5-40 mL IntraVENous PRN    0.9 % sodium chloride infusion   IntraVENous PRN    acetaminophen (TYLENOL) tablet 650 mg  650 mg Oral Q6H PRN    Or    acetaminophen (TYLENOL) suppository 650 mg  650 mg Rectal Q6H PRN    oxyCODONE (ROXICODONE) immediate release tablet 2.5 mg  2.5 mg Oral Q4H PRN    Or    oxyCODONE (ROXICODONE) immediate release tablet 5 mg  5 mg Oral Q4H PRN    melatonin tablet 3 mg  3 mg Oral Nightly    albuterol (PROVENTIL) (2.5 MG/3ML) 0.083% nebulizer solution 2.5 mg  2.5 mg Nebulization Q4H PRN    
Comments   Patient x    Family      RN x    Care Manager     Consultant                        Multidiciplinary team rounds were held today with , nursing, pharmacist and clinical coordinator.  Patient's plan of care was discussed; medications were reviewed and discharge planning was addressed.     ________________________________________________________________________  Total NON critical care TIME:  57  Minutes    Total CRITICAL CARE TIME Spent:   Minutes non procedure based      Comments   >50% of visit spent in counseling and coordination of care     ________________________________________________________________________  Ricky Troy MD     Procedures: see electronic medical records for all procedures/Xrays and details which were not copied into this note but were reviewed prior to creation of Plan.      LABS:  I reviewed today's most current labs and imaging studies.  Pertinent labs include:  Recent Labs     10/21/24  1948 10/22/24  0307   WBC 9.9 8.9   HGB 10.3* 9.9*   HCT 32.3* 31.5*    240     Recent Labs     10/21/24  1948 10/22/24  0307    138   K 4.0 3.4*    108   CO2 27 26   GLUCOSE 219* 201*   BUN 24* 22*   CREATININE 1.70* 1.51*   CALCIUM 8.9 8.8   BILITOT 0.5  --    AST 14*  --    ALT 10*  --        Signed: Ricky Troy MD        
anxious nor agitated  Skin:  No rashes.  No jaundice  Extremities: dressing on left lower extremity    Reviewed most current lab test results and cultures  YES  Reviewed most current radiology test results   YES  Review and summation of old records today    NO  Reviewed patient's current orders and MAR    YES  PMH/SH reviewed - no change compared to H&P  ________________________________________________________________________  ______________  Kathy Siddiqui MD     Procedures: see electronic medical records for all procedures/Xrays and details which were not copied into this note but were reviewed prior to creation of Plan.      LABS:  I reviewed today's most current labs and imaging studies.  Pertinent labs include:  Recent Labs     10/29/24  0931   WBC 6.2   HGB 10.5*   HCT 34.4*        Recent Labs     10/27/24  0843 10/28/24  1214 10/29/24  0931    138 139   K 3.7 3.9 3.4*    105 104   CO2 34* 29 28   GLUCOSE 179* 201* 224*   BUN 29* 29* 25*   CREATININE 1.64* 1.90* 1.88*   CALCIUM 10.0 9.6 9.5   MG 2.0  --   --    PHOS 2.5* 3.2  --        Signed: Kathy Siddiqui MD        
stent      Prior to Admission medications    Medication Sig Start Date End Date Taking? Authorizing Provider   omeprazole (PRILOSEC) 40 MG delayed release capsule Take 1 capsule by mouth daily 9/19/24 9/14/25  Darien Potter MD   pregabalin (LYRICA) 75 MG capsule Take 1 capsule by mouth daily for 360 days. Max Daily Amount: 75 mg 9/19/24 9/14/25  Darien Potter MD   carvedilol (COREG) 25 MG tablet Take 1 tablet by mouth with breakfast and with evening meal 9/19/24 9/14/25  Darien Potter MD   isosorbide mononitrate (IMDUR) 60 MG extended release tablet Take 1 tablet by mouth daily 9/19/24 9/14/25  Darien Potter MD   empagliflozin (JARDIANCE) 10 MG tablet Take 1 tablet by mouth daily 9/19/24   Darien Potter MD   fluticasone-umeclidin-vilant (TRELEGY ELLIPTA) 100-62.5-25 MCG/ACT AEPB inhaler Inhale 1 puff into the lungs daily 9/19/24   Darien Potter MD   albuterol sulfate HFA (PROVENTIL;VENTOLIN;PROAIR) 108 (90 Base) MCG/ACT inhaler Inhale 1 puff into the lungs every 4 hours as needed for Shortness of Breath 9/19/24 9/14/25  Darien Potter MD   bumetanide (BUMEX) 2 MG tablet Take 1 tablet by mouth in the morning and 1 tablet in the evening. 9/4/24   Chris Malik MD   ondansetron (ZOFRAN-ODT) 4 MG disintegrating tablet Take 1 tablet by mouth 3 times daily as needed for Nausea or Vomiting 8/11/24   Levy Dalton MD   aspirin 81 MG chewable tablet Take 1 tablet by mouth daily 7/8/24   Bel Pereira APRN - CNP   acetaminophen (TYLENOL) 500 MG tablet Take 1 tablet by mouth every 6 hours as needed for Pain 7/8/24   Bel Pereira APRN - CNP   balsum peru-castor oil (VENELEX) OINT ointment Apply 0.087 g topically 2 times daily 7/8/24   Bel Pereira APRN - CNP   ferrous sulfate (IRON 325) 325 (65 Fe) MG tablet Take 1 tablet by mouth daily (with breakfast) 7/8/24   Bel Pereira APRN - CNP   magnesium oxide (MAG-OX) 400 MG tablet Take 1 tablet by mouth 2 times

## 2024-11-04 ENCOUNTER — TELEPHONE (OUTPATIENT)
Age: 74
End: 2024-11-04

## 2024-11-04 LAB
BACTERIA SPEC CULT: NORMAL
BACTERIA SPEC CULT: NORMAL
SERVICE CMNT-IMP: NORMAL
SERVICE CMNT-IMP: NORMAL

## 2024-11-04 NOTE — TELEPHONE ENCOUNTER
Pt p# [phone],   requesting refill on Quetiapine [id number] #60 for sleeping,  prescribed by previous physician however she cannot sleep without it. Please call in to [facility] [phone],   The  requesting Potassium CL ER 10meq to be sent to [facility] [phone]

## 2024-11-05 DIAGNOSIS — E87.6 LOW BLOOD POTASSIUM: ICD-10-CM

## 2024-11-05 DIAGNOSIS — F51.01 PRIMARY INSOMNIA: Primary | ICD-10-CM

## 2024-11-05 RX ORDER — QUETIAPINE FUMARATE 25 MG/1
25 TABLET, FILM COATED ORAL
Qty: 60 TABLET | Refills: 2 | Status: SHIPPED | OUTPATIENT
Start: 2024-11-05

## 2024-11-05 RX ORDER — POTASSIUM CHLORIDE 750 MG/1
10 TABLET, EXTENDED RELEASE ORAL DAILY
Qty: 90 TABLET | Refills: 1 | Status: SHIPPED | OUTPATIENT
Start: 2024-11-05

## 2024-11-11 ENCOUNTER — TELEPHONE (OUTPATIENT)
Age: 74
End: 2024-11-11

## 2024-11-11 NOTE — TELEPHONE ENCOUNTER
Returned call to pt. Spoke with pt and ,  stated pt usually takes potassium 20 meqs twice daily, but potassium 10 meq  take once daily was sent to pharmacy.  Requesting call back from Dr Potter to explain why her dosage was changed.

## 2024-11-11 NOTE — TELEPHONE ENCOUNTER
HIPAA verified with patient's  Wesley has a question about Potassium prescription. Phone number [phone]. Patient is also on doxycycline.

## 2024-11-12 DIAGNOSIS — E87.6 LOW BLOOD POTASSIUM: ICD-10-CM

## 2024-11-12 RX ORDER — POTASSIUM CHLORIDE 1500 MG/1
20 TABLET, EXTENDED RELEASE ORAL 2 TIMES DAILY
Qty: 90 TABLET | Refills: 3 | Status: SHIPPED | OUTPATIENT
Start: 2024-11-12

## 2024-11-18 ENCOUNTER — TELEPHONE (OUTPATIENT)
Age: 74
End: 2024-11-18

## 2024-11-18 NOTE — TELEPHONE ENCOUNTER
Patient had an appointment scheduled for 11/19 and she cancelled it.    Patient would like to reschedule    Patient # [phone]

## 2024-11-19 ENCOUNTER — TELEPHONE (OUTPATIENT)
Age: 74
End: 2024-11-19

## 2024-11-19 NOTE — TELEPHONE ENCOUNTER
Spoke to patient's  he stated that they cancelled today's appointment because the wife already had 2 other appointment's on the same day. They would like to re-schedule for another day. Spoke to [facility] last week about drawing labs but  stated they have not come out to draw them. Per your note labs were to be drawn 11/13 or after. Spoke to clinical supervisor (Matt) at [facility] and he states they did not receive the order. Re-faxed order to fax number [phone]. They are to send a nurse to draw labs for patient.

## 2024-11-20 ENCOUNTER — HOSPITAL ENCOUNTER (OUTPATIENT)
Facility: HOSPITAL | Age: 74
Discharge: HOME | End: 2024-11-23
Attending: ORTHOPAEDIC SURGERY
Payer: MEDICARE

## 2024-11-20 DIAGNOSIS — Z98.1 STATUS POST ARTHRODESIS: ICD-10-CM

## 2024-11-20 DIAGNOSIS — E11.40 TYPE 2 DIABETES MELLITUS WITH DIABETIC NEUROPATHY, WITHOUT LONG-TERM CURRENT USE OF INSULIN (HCC): ICD-10-CM

## 2024-11-20 DIAGNOSIS — M25.572 CHRONIC PAIN OF LEFT ANKLE: ICD-10-CM

## 2024-11-20 DIAGNOSIS — S82.842S: ICD-10-CM

## 2024-11-20 DIAGNOSIS — G89.29 CHRONIC PAIN OF LEFT ANKLE: ICD-10-CM

## 2024-11-20 DIAGNOSIS — M86.9: ICD-10-CM

## 2024-11-20 PROCEDURE — 73700 CT LOWER EXTREMITY W/O DYE: CPT

## 2024-12-16 ENCOUNTER — APPOINTMENT (OUTPATIENT)
Facility: HOSPITAL | Age: 74
End: 2024-12-16
Payer: MEDICARE

## 2024-12-16 ENCOUNTER — HOSPITAL ENCOUNTER (EMERGENCY)
Facility: HOSPITAL | Age: 74
Discharge: ANOTHER HOSPITAL | End: 2024-12-17
Attending: EMERGENCY MEDICINE
Payer: MEDICARE

## 2024-12-16 DIAGNOSIS — N30.00 ACUTE CYSTITIS WITHOUT HEMATURIA: ICD-10-CM

## 2024-12-16 DIAGNOSIS — M86.9 OSTEOMYELITIS OF LEFT ANKLE, UNSPECIFIED TYPE: ICD-10-CM

## 2024-12-16 DIAGNOSIS — R50.9 FEBRILE ILLNESS: Primary | ICD-10-CM

## 2024-12-16 LAB
ALBUMIN SERPL-MCNC: 3 G/DL (ref 3.5–5)
ALBUMIN/GLOB SERPL: 0.7 (ref 1.1–2.2)
ALP SERPL-CCNC: 195 U/L (ref 45–117)
ALT SERPL-CCNC: 10 U/L (ref 12–78)
ANION GAP SERPL CALC-SCNC: 10 MMOL/L (ref 2–12)
APPEARANCE UR: ABNORMAL
AST SERPL-CCNC: 17 U/L (ref 15–37)
BACTERIA URNS QL MICRO: ABNORMAL /HPF
BASOPHILS # BLD: 0 K/UL (ref 0–0.1)
BASOPHILS NFR BLD: 0 % (ref 0–1)
BILIRUB SERPL-MCNC: 0.6 MG/DL (ref 0.2–1)
BILIRUB UR QL: NEGATIVE
BUN SERPL-MCNC: 22 MG/DL (ref 6–20)
BUN/CREAT SERPL: 12 (ref 12–20)
CALCIUM SERPL-MCNC: 9.2 MG/DL (ref 8.5–10.1)
CHLORIDE SERPL-SCNC: 106 MMOL/L (ref 97–108)
CO2 SERPL-SCNC: 27 MMOL/L (ref 21–32)
COLOR UR: ABNORMAL
CREAT SERPL-MCNC: 1.8 MG/DL (ref 0.55–1.02)
DIFFERENTIAL METHOD BLD: ABNORMAL
EOSINOPHIL # BLD: 0.2 K/UL (ref 0–0.4)
EOSINOPHIL NFR BLD: 2 % (ref 0–7)
EPITH CASTS URNS QL MICRO: ABNORMAL /LPF
ERYTHROCYTE [DISTWIDTH] IN BLOOD BY AUTOMATED COUNT: 16 % (ref 11.5–14.5)
FLUAV RNA SPEC QL NAA+PROBE: NOT DETECTED
FLUBV RNA SPEC QL NAA+PROBE: NOT DETECTED
GLOBULIN SER CALC-MCNC: 4.2 G/DL (ref 2–4)
GLUCOSE SERPL-MCNC: 183 MG/DL (ref 65–100)
GLUCOSE UR STRIP.AUTO-MCNC: NEGATIVE MG/DL
HCT VFR BLD AUTO: 34.8 % (ref 35–47)
HGB BLD-MCNC: 11 G/DL (ref 11.5–16)
HGB UR QL STRIP: ABNORMAL
IMM GRANULOCYTES # BLD AUTO: 0.1 K/UL (ref 0–0.04)
IMM GRANULOCYTES NFR BLD AUTO: 1 % (ref 0–0.5)
KETONES UR QL STRIP.AUTO: NEGATIVE MG/DL
LACTATE SERPL-SCNC: 1.4 MMOL/L (ref 0.4–2)
LEUKOCYTE ESTERASE UR QL STRIP.AUTO: ABNORMAL
LYMPHOCYTES # BLD: 0.5 K/UL (ref 0.8–3.5)
LYMPHOCYTES NFR BLD: 6 % (ref 12–49)
MCH RBC QN AUTO: 27.3 PG (ref 26–34)
MCHC RBC AUTO-ENTMCNC: 31.6 G/DL (ref 30–36.5)
MCV RBC AUTO: 86.4 FL (ref 80–99)
MONOCYTES # BLD: 0.4 K/UL (ref 0–1)
MONOCYTES NFR BLD: 4 % (ref 5–13)
NEUTS SEG # BLD: 7.8 K/UL (ref 1.8–8)
NEUTS SEG NFR BLD: 87 % (ref 32–75)
NITRITE UR QL STRIP.AUTO: NEGATIVE
NRBC # BLD: 0 K/UL (ref 0–0.01)
NRBC BLD-RTO: 0 PER 100 WBC
PH UR STRIP: 5.5 (ref 5–8)
PLATELET # BLD AUTO: 232 K/UL (ref 150–400)
PMV BLD AUTO: 11.1 FL (ref 8.9–12.9)
POTASSIUM SERPL-SCNC: 3.6 MMOL/L (ref 3.5–5.1)
PROT SERPL-MCNC: 7.2 G/DL (ref 6.4–8.2)
PROT UR STRIP-MCNC: 100 MG/DL
RBC # BLD AUTO: 4.03 M/UL (ref 3.8–5.2)
RBC #/AREA URNS HPF: ABNORMAL /HPF (ref 0–5)
RBC MORPH BLD: ABNORMAL
SARS-COV-2 RNA RESP QL NAA+PROBE: NOT DETECTED
SODIUM SERPL-SCNC: 143 MMOL/L (ref 136–145)
SOURCE: NORMAL
SP GR UR REFRACTOMETRY: 1.01
UROBILINOGEN UR QL STRIP.AUTO: 1 EU/DL (ref 0.2–1)
WBC # BLD AUTO: 9 K/UL (ref 3.6–11)
WBC URNS QL MICRO: ABNORMAL /HPF (ref 0–4)

## 2024-12-16 PROCEDURE — 74176 CT ABD & PELVIS W/O CONTRAST: CPT

## 2024-12-16 PROCEDURE — 6370000000 HC RX 637 (ALT 250 FOR IP): Performed by: EMERGENCY MEDICINE

## 2024-12-16 PROCEDURE — 85652 RBC SED RATE AUTOMATED: CPT

## 2024-12-16 PROCEDURE — 73610 X-RAY EXAM OF ANKLE: CPT

## 2024-12-16 PROCEDURE — 99285 EMERGENCY DEPT VISIT HI MDM: CPT

## 2024-12-16 PROCEDURE — 87154 CUL TYP ID BLD PTHGN 6+ TRGT: CPT

## 2024-12-16 PROCEDURE — 87636 SARSCOV2 & INF A&B AMP PRB: CPT

## 2024-12-16 PROCEDURE — 80053 COMPREHEN METABOLIC PANEL: CPT

## 2024-12-16 PROCEDURE — 83605 ASSAY OF LACTIC ACID: CPT

## 2024-12-16 PROCEDURE — 87181 SC STD AGAR DILUTION PER AGT: CPT

## 2024-12-16 PROCEDURE — 85025 COMPLETE CBC W/AUTO DIFF WBC: CPT

## 2024-12-16 PROCEDURE — 87077 CULTURE AEROBIC IDENTIFY: CPT

## 2024-12-16 PROCEDURE — 71045 X-RAY EXAM CHEST 1 VIEW: CPT

## 2024-12-16 PROCEDURE — 87088 URINE BACTERIA CULTURE: CPT

## 2024-12-16 PROCEDURE — 87086 URINE CULTURE/COLONY COUNT: CPT

## 2024-12-16 PROCEDURE — 96375 TX/PRO/DX INJ NEW DRUG ADDON: CPT

## 2024-12-16 PROCEDURE — 87040 BLOOD CULTURE FOR BACTERIA: CPT

## 2024-12-16 PROCEDURE — 87186 SC STD MICRODIL/AGAR DIL: CPT

## 2024-12-16 PROCEDURE — 81001 URINALYSIS AUTO W/SCOPE: CPT

## 2024-12-16 PROCEDURE — 6360000002 HC RX W HCPCS: Performed by: EMERGENCY MEDICINE

## 2024-12-16 RX ORDER — IBUPROFEN 400 MG/1
400 TABLET, FILM COATED ORAL
Status: COMPLETED | OUTPATIENT
Start: 2024-12-16 | End: 2024-12-16

## 2024-12-16 RX ORDER — QUETIAPINE FUMARATE 25 MG/1
25 TABLET, FILM COATED ORAL
Status: COMPLETED | OUTPATIENT
Start: 2024-12-16 | End: 2024-12-16

## 2024-12-16 RX ORDER — ACETAMINOPHEN 500 MG
1000 TABLET ORAL
Status: COMPLETED | OUTPATIENT
Start: 2024-12-16 | End: 2024-12-16

## 2024-12-16 RX ORDER — MORPHINE SULFATE 4 MG/ML
4 INJECTION, SOLUTION INTRAMUSCULAR; INTRAVENOUS
Status: COMPLETED | OUTPATIENT
Start: 2024-12-16 | End: 2024-12-16

## 2024-12-16 RX ADMIN — QUETIAPINE FUMARATE 25 MG: 25 TABLET ORAL at 20:45

## 2024-12-16 RX ADMIN — ACETAMINOPHEN 1000 MG: 500 TABLET ORAL at 18:49

## 2024-12-16 RX ADMIN — MORPHINE SULFATE 4 MG: 4 INJECTION INTRAVENOUS at 20:26

## 2024-12-16 RX ADMIN — IBUPROFEN 400 MG: 400 TABLET, FILM COATED ORAL at 20:26

## 2024-12-16 ASSESSMENT — PAIN SCALES - GENERAL: PAINLEVEL_OUTOF10: 5

## 2024-12-16 ASSESSMENT — PAIN DESCRIPTION - ORIENTATION: ORIENTATION: LEFT

## 2024-12-16 ASSESSMENT — PAIN DESCRIPTION - LOCATION: LOCATION: FOOT

## 2024-12-16 NOTE — ED TRIAGE NOTES
Pt presents via EMS who state pt has had flu-like symptoms x 2 days. Pt states she has had intermittent chills and body aches with cough producing white sputum, vomiting, and presents with a fever today. Pt has been taking Tylenol.

## 2024-12-16 NOTE — ED NOTES
Pt presents to ED ambulatory complaining of cold/flu like symptoms x 2 days. Pt's  states that she has had intermittent cold chills, body aches, and fatigue today. Pt also endorses productive cough with white phlegm. Pt denies SOB, sore throat. Pt has zazueta ankle surgery and had a hx of infection. Pt  reports no drainage d/t home health nurse coming by to check on it twice a week. Pt  states she has had hx of rods and plates placed in her ankle. Pt endorses 5/10 pain in her ankle. Pt is alert and oriented x 4, RR even and unlabored, skin is warm and dry. Assessment completed and pt updated on plan of care.  Call bell in reach.      Emergency Department Nursing Plan of Care       The Nursing Plan of Care is developed from the Nursing assessment and Emergency Department Attending provider initial evaluation.  The plan of care may be reviewed in the “ED Provider note”.    The Plan of Care was developed with the following considerations:   Patient / Family readiness to learn indicated by:verbalized understanding  Persons(s) to be included in education: patient  Barriers to Learning/Limitations:None    Signed

## 2024-12-16 NOTE — ED PROVIDER NOTES
fibrillation (HCC)     CAD (coronary artery disease)     CHF (congestive heart failure) ([facility])     Chronic back pain     Chronic systolic heart failure ([facility])     CKD stage G3b/A1, GFR 30-44 and albumin creatinine ratio <30 mg/g ([facility])     STAGE 3B    COPD (chronic obstructive pulmonary disease) ([facility])     WITH (ACUTE) EXACERATION    Dependence on renal dialysis ([facility])     Dependence on supplemental oxygen     Difficulty in walking, not elsewhere classified     Essential hypertension     GERD (gastroesophageal reflux disease)     Hemorrhoids     PAIN    History of diverticulitis     diverticulitis    History of echocardiogram 11/2021    LV: Estimated LVEF is 40 - 45%. Visually measured ejection fraction. Normal cavity size and wall thickness. Globally reduced systolic function.  LA: Moderately dilated left atrium. Left atrial appendage is completely occluded. A Watchman left atrial appendage occluder is present and completely occludes the appendage.    History of migraine     History of MRSA infection     Hypercholesterolemia 01/22/2018    Hypertensive heart and chronic kidney disease with heart failure and stage 1 through stage 4 chronic kidney disease, or unspecified chronic kidney disease ([facility])     Immunodeficiency, unspecified ([facility])     Irritable bowel syndrome     IBS, spinal stenosis    Long term (current) use of insulin ([facility])     Long-term use of high-risk medication 01/22/2018    Lumbar spinal stenosis     Metabolic encephalopathy     Morbid (severe) obesity due to excess calories     Muscle weakness (generalized)     Neuropathy     Obesity (BMI 30-39.9) 04/30/2019    Obstructive sleep apnea     uses CPAP    Permanent atrial fibrillation ([facility])     Presence of implantable cardioverter-defibrillator (ICD)     Presence of other cardiac implants and grafts     PACEMAKER    Presence of Watchman left atrial appendage closure device     Primary generalized (osteo)arthritis     Spinal stenosis, lumbar region, without

## 2024-12-17 ENCOUNTER — HOSPITAL ENCOUNTER (INPATIENT)
Facility: HOSPITAL | Age: 74
LOS: 7 days | Discharge: HOME HEALTH | DRG: 638 | End: 2024-12-24
Attending: INTERNAL MEDICINE | Admitting: FAMILY MEDICINE
Payer: MEDICARE

## 2024-12-17 VITALS
DIASTOLIC BLOOD PRESSURE: 59 MMHG | BODY MASS INDEX: 35.79 KG/M2 | HEART RATE: 80 BPM | HEIGHT: 67 IN | SYSTOLIC BLOOD PRESSURE: 101 MMHG | WEIGHT: 228 LBS | RESPIRATION RATE: 22 BRPM | TEMPERATURE: 97.8 F | OXYGEN SATURATION: 98 %

## 2024-12-17 DIAGNOSIS — M86.672 OTHER CHRONIC OSTEOMYELITIS OF LEFT FOOT: ICD-10-CM

## 2024-12-17 DIAGNOSIS — R78.81 BACTEREMIA DUE TO METHICILLIN RESISTANT STAPHYLOCOCCUS AUREUS: ICD-10-CM

## 2024-12-17 DIAGNOSIS — I50.9 ACUTE HEART FAILURE, UNSPECIFIED HEART FAILURE TYPE (HCC): ICD-10-CM

## 2024-12-17 DIAGNOSIS — R78.81 BACTEREMIA: ICD-10-CM

## 2024-12-17 DIAGNOSIS — B95.62 BACTEREMIA DUE TO METHICILLIN RESISTANT STAPHYLOCOCCUS AUREUS: ICD-10-CM

## 2024-12-17 DIAGNOSIS — Z98.1 S/P ANKLE ARTHRODESIS: Primary | ICD-10-CM

## 2024-12-17 PROBLEM — M86.9 OSTEOMYELITIS: Status: ACTIVE | Noted: 2024-12-17

## 2024-12-17 LAB
ACB COMPLEX DNA BLD POS QL NAA+NON-PROBE: NOT DETECTED
ACCESSION NUMBER, LLC1M: ABNORMAL
B FRAGILIS DNA BLD POS QL NAA+NON-PROBE: NOT DETECTED
BIOFIRE TEST COMMENT: ABNORMAL
C ALBICANS DNA BLD POS QL NAA+NON-PROBE: NOT DETECTED
C AURIS DNA BLD POS QL NAA+NON-PROBE: NOT DETECTED
C GATTII+NEOFOR DNA BLD POS QL NAA+N-PRB: NOT DETECTED
C GLABRATA DNA BLD POS QL NAA+NON-PROBE: NOT DETECTED
C KRUSEI DNA BLD POS QL NAA+NON-PROBE: NOT DETECTED
C PARAP DNA BLD POS QL NAA+NON-PROBE: NOT DETECTED
C TROPICLS DNA BLD POS QL NAA+NON-PROBE: NOT DETECTED
CRP SERPL-MCNC: 10.1 MG/DL (ref 0–0.3)
E CLOAC COMP DNA BLD POS NAA+NON-PROBE: NOT DETECTED
E COLI DNA BLD POS QL NAA+NON-PROBE: NOT DETECTED
E FAECALIS DNA BLD POS QL NAA+NON-PROBE: NOT DETECTED
E FAECIUM DNA BLD POS QL NAA+NON-PROBE: NOT DETECTED
ENTEROBACTERALES DNA BLD POS NAA+N-PRB: NOT DETECTED
ERYTHROCYTE [SEDIMENTATION RATE] IN BLOOD: 74 MM/HR (ref 0–30)
GP B STREP DNA BLD POS QL NAA+NON-PROBE: NOT DETECTED
HAEM INFLU DNA BLD POS QL NAA+NON-PROBE: NOT DETECTED
K OXYTOCA DNA BLD POS QL NAA+NON-PROBE: NOT DETECTED
KLEBSIELLA SP DNA BLD POS QL NAA+NON-PRB: NOT DETECTED
KLEBSIELLA SP DNA BLD POS QL NAA+NON-PRB: NOT DETECTED
L MONOCYTOG DNA BLD POS QL NAA+NON-PROBE: NOT DETECTED
MECA+MECC+MREJ ISLT/SPM QL: DETECTED
N MEN DNA BLD POS QL NAA+NON-PROBE: NOT DETECTED
P AERUGINOSA DNA BLD POS NAA+NON-PROBE: NOT DETECTED
PROTEUS SP DNA BLD POS QL NAA+NON-PROBE: NOT DETECTED
RESISTANT GENE TARGETS: ABNORMAL
S AUREUS DNA BLD POS QL NAA+NON-PROBE: DETECTED
S AUREUS+CONS DNA BLD POS NAA+NON-PROBE: DETECTED
S EPIDERMIDIS DNA BLD POS QL NAA+NON-PRB: NOT DETECTED
S LUGDUNENSIS DNA BLD POS QL NAA+NON-PRB: NOT DETECTED
S MALTOPHILIA DNA BLD POS QL NAA+NON-PRB: NOT DETECTED
S MARCESCENS DNA BLD POS NAA+NON-PROBE: NOT DETECTED
S PNEUM DNA BLD POS QL NAA+NON-PROBE: NOT DETECTED
S PYO DNA BLD POS QL NAA+NON-PROBE: NOT DETECTED
SALMONELLA DNA BLD POS QL NAA+NON-PROBE: NOT DETECTED
STREPTOCOCCUS DNA BLD POS NAA+NON-PROBE: NOT DETECTED

## 2024-12-17 PROCEDURE — 2580000003 HC RX 258: Performed by: EMERGENCY MEDICINE

## 2024-12-17 PROCEDURE — 6370000000 HC RX 637 (ALT 250 FOR IP): Performed by: EMERGENCY MEDICINE

## 2024-12-17 PROCEDURE — 36415 COLL VENOUS BLD VENIPUNCTURE: CPT

## 2024-12-17 PROCEDURE — 96367 TX/PROPH/DG ADDL SEQ IV INF: CPT

## 2024-12-17 PROCEDURE — 6360000002 HC RX W HCPCS: Performed by: FAMILY MEDICINE

## 2024-12-17 PROCEDURE — 96365 THER/PROPH/DIAG IV INF INIT: CPT

## 2024-12-17 PROCEDURE — 6360000002 HC RX W HCPCS: Performed by: EMERGENCY MEDICINE

## 2024-12-17 PROCEDURE — 6370000000 HC RX 637 (ALT 250 FOR IP): Performed by: FAMILY MEDICINE

## 2024-12-17 PROCEDURE — 96366 THER/PROPH/DIAG IV INF ADDON: CPT

## 2024-12-17 PROCEDURE — 86140 C-REACTIVE PROTEIN: CPT

## 2024-12-17 PROCEDURE — 1100000000 HC RM PRIVATE

## 2024-12-17 PROCEDURE — 94640 AIRWAY INHALATION TREATMENT: CPT

## 2024-12-17 PROCEDURE — 2500000003 HC RX 250 WO HCPCS: Performed by: FAMILY MEDICINE

## 2024-12-17 PROCEDURE — 2580000003 HC RX 258: Performed by: FAMILY MEDICINE

## 2024-12-17 RX ORDER — POTASSIUM CHLORIDE 750 MG/1
40 TABLET, EXTENDED RELEASE ORAL PRN
Status: DISCONTINUED | OUTPATIENT
Start: 2024-12-17 | End: 2024-12-24 | Stop reason: HOSPADM

## 2024-12-17 RX ORDER — MAGNESIUM SULFATE IN WATER 40 MG/ML
2000 INJECTION, SOLUTION INTRAVENOUS PRN
Status: DISCONTINUED | OUTPATIENT
Start: 2024-12-17 | End: 2024-12-24 | Stop reason: HOSPADM

## 2024-12-17 RX ORDER — ENOXAPARIN SODIUM 100 MG/ML
30 INJECTION SUBCUTANEOUS EVERY 12 HOURS
Status: DISCONTINUED | OUTPATIENT
Start: 2024-12-17 | End: 2024-12-24 | Stop reason: HOSPADM

## 2024-12-17 RX ORDER — ARFORMOTEROL TARTRATE 15 UG/2ML
15 SOLUTION RESPIRATORY (INHALATION)
Status: DISCONTINUED | OUTPATIENT
Start: 2024-12-17 | End: 2024-12-18

## 2024-12-17 RX ORDER — QUETIAPINE FUMARATE 25 MG/1
25 TABLET, FILM COATED ORAL
Status: DISCONTINUED | OUTPATIENT
Start: 2024-12-17 | End: 2024-12-24 | Stop reason: HOSPADM

## 2024-12-17 RX ORDER — ACETAMINOPHEN 325 MG/1
650 TABLET ORAL EVERY 4 HOURS PRN
Status: DISCONTINUED | OUTPATIENT
Start: 2024-12-17 | End: 2024-12-17 | Stop reason: HOSPADM

## 2024-12-17 RX ORDER — PANTOPRAZOLE SODIUM 40 MG/1
40 TABLET, DELAYED RELEASE ORAL
Status: DISCONTINUED | OUTPATIENT
Start: 2024-12-18 | End: 2024-12-24 | Stop reason: HOSPADM

## 2024-12-17 RX ORDER — OXYCODONE HYDROCHLORIDE 5 MG/1
5 TABLET ORAL EVERY 4 HOURS PRN
Status: DISCONTINUED | OUTPATIENT
Start: 2024-12-17 | End: 2024-12-17 | Stop reason: HOSPADM

## 2024-12-17 RX ORDER — BUMETANIDE 1 MG/1
1 TABLET ORAL DAILY
Status: DISCONTINUED | OUTPATIENT
Start: 2024-12-17 | End: 2024-12-19

## 2024-12-17 RX ORDER — SODIUM CHLORIDE 9 MG/ML
INJECTION, SOLUTION INTRAVENOUS PRN
Status: DISCONTINUED | OUTPATIENT
Start: 2024-12-17 | End: 2024-12-24 | Stop reason: HOSPADM

## 2024-12-17 RX ORDER — FERROUS SULFATE 325(65) MG
325 TABLET ORAL
Status: DISCONTINUED | OUTPATIENT
Start: 2024-12-18 | End: 2024-12-24 | Stop reason: HOSPADM

## 2024-12-17 RX ORDER — PREGABALIN 75 MG/1
75 CAPSULE ORAL DAILY
Status: DISCONTINUED | OUTPATIENT
Start: 2024-12-17 | End: 2024-12-24 | Stop reason: HOSPADM

## 2024-12-17 RX ORDER — IBUPROFEN 600 MG/1
600 TABLET, FILM COATED ORAL EVERY 6 HOURS PRN
Status: DISCONTINUED | OUTPATIENT
Start: 2024-12-17 | End: 2024-12-17 | Stop reason: HOSPADM

## 2024-12-17 RX ORDER — ACETAMINOPHEN 650 MG/1
650 SUPPOSITORY RECTAL EVERY 6 HOURS PRN
Status: DISCONTINUED | OUTPATIENT
Start: 2024-12-17 | End: 2024-12-24 | Stop reason: HOSPADM

## 2024-12-17 RX ORDER — ONDANSETRON 2 MG/ML
4 INJECTION INTRAMUSCULAR; INTRAVENOUS EVERY 6 HOURS PRN
Status: DISCONTINUED | OUTPATIENT
Start: 2024-12-17 | End: 2024-12-24 | Stop reason: HOSPADM

## 2024-12-17 RX ORDER — ONDANSETRON 4 MG/1
4 TABLET, ORALLY DISINTEGRATING ORAL EVERY 8 HOURS PRN
Status: DISCONTINUED | OUTPATIENT
Start: 2024-12-17 | End: 2024-12-24 | Stop reason: HOSPADM

## 2024-12-17 RX ORDER — POTASSIUM CHLORIDE 7.45 MG/ML
10 INJECTION INTRAVENOUS PRN
Status: DISCONTINUED | OUTPATIENT
Start: 2024-12-17 | End: 2024-12-24 | Stop reason: HOSPADM

## 2024-12-17 RX ORDER — ASPIRIN 81 MG/1
81 TABLET, CHEWABLE ORAL DAILY
Status: DISCONTINUED | OUTPATIENT
Start: 2024-12-17 | End: 2024-12-24 | Stop reason: HOSPADM

## 2024-12-17 RX ORDER — SODIUM CHLORIDE 0.9 % (FLUSH) 0.9 %
5-40 SYRINGE (ML) INJECTION EVERY 12 HOURS SCHEDULED
Status: DISCONTINUED | OUTPATIENT
Start: 2024-12-17 | End: 2024-12-24 | Stop reason: HOSPADM

## 2024-12-17 RX ORDER — ACETAMINOPHEN 325 MG/1
650 TABLET ORAL EVERY 6 HOURS PRN
Status: DISCONTINUED | OUTPATIENT
Start: 2024-12-17 | End: 2024-12-24 | Stop reason: HOSPADM

## 2024-12-17 RX ORDER — SODIUM CHLORIDE 0.9 % (FLUSH) 0.9 %
5-40 SYRINGE (ML) INJECTION PRN
Status: DISCONTINUED | OUTPATIENT
Start: 2024-12-17 | End: 2024-12-24 | Stop reason: HOSPADM

## 2024-12-17 RX ORDER — BUDESONIDE 0.25 MG/2ML
0.25 INHALANT ORAL
Status: DISCONTINUED | OUTPATIENT
Start: 2024-12-17 | End: 2024-12-18

## 2024-12-17 RX ORDER — POLYETHYLENE GLYCOL 3350 17 G/17G
17 POWDER, FOR SOLUTION ORAL DAILY PRN
Status: DISCONTINUED | OUTPATIENT
Start: 2024-12-17 | End: 2024-12-24 | Stop reason: HOSPADM

## 2024-12-17 RX ORDER — ONDANSETRON 2 MG/ML
4 INJECTION INTRAMUSCULAR; INTRAVENOUS EVERY 4 HOURS PRN
Status: DISCONTINUED | OUTPATIENT
Start: 2024-12-17 | End: 2024-12-17 | Stop reason: HOSPADM

## 2024-12-17 RX ADMIN — IPRATROPIUM BROMIDE 0.5 MG: 0.5 SOLUTION RESPIRATORY (INHALATION) at 21:01

## 2024-12-17 RX ADMIN — PREGABALIN 75 MG: 75 CAPSULE ORAL at 11:30

## 2024-12-17 RX ADMIN — CEFEPIME 2000 MG: 2 INJECTION, POWDER, FOR SOLUTION INTRAVENOUS at 21:37

## 2024-12-17 RX ADMIN — VANCOMYCIN HYDROCHLORIDE 1250 MG: 1.25 INJECTION, POWDER, LYOPHILIZED, FOR SOLUTION INTRAVENOUS at 02:05

## 2024-12-17 RX ADMIN — BUMETANIDE 1 MG: 1 TABLET ORAL at 11:30

## 2024-12-17 RX ADMIN — IBUPROFEN 600 MG: 600 TABLET, FILM COATED ORAL at 05:57

## 2024-12-17 RX ADMIN — ACETAMINOPHEN 650 MG: 325 TABLET ORAL at 06:11

## 2024-12-17 RX ADMIN — VANCOMYCIN HYDROCHLORIDE 1250 MG: 1.25 INJECTION, POWDER, LYOPHILIZED, FOR SOLUTION INTRAVENOUS at 03:26

## 2024-12-17 RX ADMIN — ENOXAPARIN SODIUM 30 MG: 100 INJECTION SUBCUTANEOUS at 21:31

## 2024-12-17 RX ADMIN — ARFORMOTEROL TARTRATE 15 MCG: 15 SOLUTION RESPIRATORY (INHALATION) at 21:01

## 2024-12-17 RX ADMIN — BUDESONIDE 250 MCG: 0.25 INHALANT RESPIRATORY (INHALATION) at 21:01

## 2024-12-17 RX ADMIN — CEFEPIME HYDROCHLORIDE 2000 MG: 2 INJECTION, POWDER, FOR SOLUTION INTRAVENOUS at 00:00

## 2024-12-17 RX ADMIN — QUETIAPINE FUMARATE 25 MG: 25 TABLET ORAL at 21:30

## 2024-12-17 RX ADMIN — SODIUM CHLORIDE, PRESERVATIVE FREE 10 ML: 5 INJECTION INTRAVENOUS at 21:43

## 2024-12-17 RX ADMIN — IPRATROPIUM BROMIDE 0.5 MG: 0.5 SOLUTION RESPIRATORY (INHALATION) at 13:23

## 2024-12-17 RX ADMIN — ASPIRIN 81 MG CHEWABLE TABLET 81 MG: 81 TABLET CHEWABLE at 11:29

## 2024-12-17 NOTE — ED NOTES
Bedside shift change report given to Tia Truong RN   (oncoming nurse) by AGNES Cornelius (offgoing nurse). Report included the following information ED SBAR, MAR, Recent Results, Neuro Assessment, and Event Log.

## 2024-12-17 NOTE — ED NOTES
TRANSFER - OUT REPORT:    Verbal report given to AGNES Shine on Marilee Oakes  being transferred to [place] [id number] for routine progression of patient care       Report consisted of patient's Situation, Background, Assessment and   Recommendations(SBAR).     Information from the following report(s) ED SBAR, MAR, Recent Results, and Event Log was reviewed with the receiving nurse.    Independence Fall Assessment:    Presents to emergency department  because of falls (Syncope, seizure, or loss of consciousness): No  Age > 70: No  Altered Mental Status, Intoxication with alcohol or substance confusion (Disorientation, impaired judgment, poor safety awaremess, or inability to follow instructions): No  Impaired Mobility: Ambulates or transfers with assistive devices or assistance; Unable to ambulate or transer.: No  Nursing Judgement: No          Lines:   Peripheral IV 12/16/24 Right Antecubital (Active)        Opportunity for questions and clarification was provided.      Patient transported with: Belongings and EMTALA

## 2024-12-17 NOTE — ED NOTES
MD at bedside with pt and  discussing next steps for plan of care and rationale for transferring pt to [facility].

## 2024-12-17 NOTE — ED NOTES
Pt begins to and co being cold and hurting all over.  Temp was 97.8, but suspect that she may be beginning to spike a fever.  Tylenol and Ibuprofen given for pain and prophylaxis.

## 2024-12-17 NOTE — PROGRESS NOTES
1600: Patient yelling out \"help, help me, help\", when this nurse asked what is wrong - patient replied with \"I'm cold\". Clarified to patient that it is not appropriate to yell out saying help me out into the hallway unless urgent - clarified to use the call light on the remote when they need assistance for non urgent matters. Patent stated, \"okay, but I'm cold\". Turned up room temperature as well as put warm blanket on patient.    Patient continues to yell out into hallway saying help, help me. Clyman was in room asking what patient needed - patient needed to use the bathroom. Reinforced to patient that they cannot yell out into the hallway like before - assisted patient to bedside commode to where patient had large bowel movement. Assisted patient back to bed, call light within reach, clarified no needs at this time.

## 2024-12-17 NOTE — CARE COORDINATION
ANGELIQUE:    CM met with Pt at bedside. She was visibly/shivering cold during admission process. CM and tech addressed immediate comfort. Pt came from [facility] most recently, [facility] and other facilities in the past.     Patient uses a walker at baseline but also owns w/c, has a ramp, H bed, BSC, and rollator. She does not drive but lives with  who drives her to appointment.     Other services:   [facility]  current (open for SN, PT, and OT)  [facility] for home O2 current  [facility] prior  [facility] SNF, [facility], [facility] prior    CM will follow for needs during current stay. Podiatry following, IV ABX    Care Management Initial Assessment       RUR:32%  Readmission? No  1st IM letter given? Yes   1st  letter given: No      12/17/24 9671   Service Assessment   Patient Orientation Alert and Oriented   Cognition Alert   History Provided By Patient   Primary Caregiver Spouse   Support Systems Spouse/Significant Other   Patient's Healthcare Decision Maker is: Named in Scanned ACP Document   PCP Verified by CM Yes   Prior Functional Level Assistance with the following:;Mobility;Housework   Current Functional Level Assistance with the following:;Mobility;Housework   Can patient return to prior living arrangement Unknown at present   Ability to make needs known: Good   Family able to assist with home care needs: Yes   Would you like for me to discuss the discharge plan with any other family members/significant others, and if so, who? Yes   Social/Functional History   Type of Home House   Home Layout One level   Home Access Ramped entrance   Bathroom Equipment Commode   Home Equipment Wheelchair - Manual;Hospital bed;Walker - Rolling;Rollator   Receives Help From Family   Active  No   Patient's  Info  drives to \[facility] [place] [facility]\""   Discharge Planning   Patient expects to be discharged to: Unknown   Condition of Participation: Discharge Planning   The Plan for Transition of Care is

## 2024-12-17 NOTE — ED NOTES
Verbal shift change report given to AGNES Cornelius (oncoming nurse) by Dacia ALARCON (offgoing nurse). Report included the following information Nurse Handoff Report, ED SBAR, and MAR.

## 2024-12-17 NOTE — PROGRESS NOTES
Pharmacy Note - Cefepime    Cefepime 2 gm IVPB q 12 h (4 hr infusion) ordered for treatment of Skin and Soft Tissue Infection. Per [facility] Policy, Cefepime will be changed to 2 gm IVPB q 24 h.    ceFEPIme (MAXIPIME) 2,000 mg in sodium chloride 0.9 % 100 mL IVPB (mini-bag) initial dose New Bag : 2,000 mg : 200 mL/hr : IntraVENous 12/17/24 0000 12/17/24 0002 Adryan Godinez RN             Estimated Creatinine Clearance: Estimated Creatinine Clearance: 35 mL/min (A) (based on SCr of 1.8 mg/dL (H)).  Dialysis Status, HILARY, CKD: CKD stage G3b/A1, GFR 30-44 and albumin creatinine ratio <30 mg/g (HCC)     BMI:  35.71    Rationale for Adjustment:  [facility] B-Lactam extended infusion policy adjusted for CrCl ~ 35 ml/min    Pharmacy will continue to monitor and adjust dose as necessary.      Please call inpatient pharmacy with any questions.    Thank you,  MARTHA TORRES, [facility] MS

## 2024-12-17 NOTE — ED NOTES
Pt and  refuse to sign EMTALA paperwork after speaking to the MD. They are requesting to be transferred to [facility] rather than [facility]. Charge nurse made aware.

## 2024-12-17 NOTE — CONSULTS
I was consulted for the left ankle infection. I did introduce myself to the patient and . He is very upset. States that they did get a second opinion from Dr. Steve Duckworth, but he is a [facility] physician. States ORIF in June. Hardware removal in October. States she has tried IV Abx in the past. Frustrated and scared to loose leg.    I will defer eval and management to primary surgeon Dr. Chan. Consult place. Please reconsult if needed.

## 2024-12-17 NOTE — ED NOTES
This nurse attempted to call report and was placed on hold without an answer. Will try again after shift change.

## 2024-12-17 NOTE — ED NOTES
This nurse entered pts room to inform pt that she has a bed a [facility] and to provide her EMTALA information to sign. Pt  refuses to sign the EMTALA without speaking to a

## 2024-12-17 NOTE — PROGRESS NOTES
Received patient in sign out from Dr. Rodriguez overnight.  Patient has fever.  Dr. Samayoa was concerned for recurrent osteomyelitis as possible source of infection.  Dr. Samayoa spoke with Dr. Pitts overnight.  Decision was made by Dr. Samayoa to transfer patient due to concern for osteomyelitis as possible source of infection.     Updated patient and family at bedside.  Bed available at [facility].  They do not want transfer.      Dr. Maame Chan is patient's orthopedic surgeon    PE  Vital signs reviewed and documented in EMR  Nontoxic, no acute distress, but ill appearing  No tachycardia, regular  Abdomen nondistended, soft, nontender  No focal neuro deficits  Left lower extremity swelling, erythema and tenderness just above ankle    No leukocytosis  Slight creatinine elevation  UA w large leuk esterase, 20-50 WBCs, few squams    A/P:  Patient with fever with two possible sources of infection.  Decision made by Dr. Samayoa to transfer with largest concern for osteomyelitis and inability to care for her further at this facility.  - Empiric antibiotics given  - Stable for transfer at this time    LEXII Merritt MD

## 2024-12-17 NOTE — ED NOTES
After discussion with charge RN and ED director,  and patient will to be transferred to [facility]

## 2024-12-17 NOTE — PROGRESS NOTES
Pharmacist Note - Vancomycin Dosing    Consult provided for this 74 y.o. female for indication of SSTI, recurrent osteomyelitis as possible source of infection, H/o MRSA osteomyelitis.  Antibiotic regimen(s): Vancomycin and cefepime  Patient on vancomycin PTA? YES (Received loading dose at [facility] prior to transfer to [facility])    Recent Labs     24  1829   WBC 9.0   CREATININE 1.80*   BUN 22*     SCr Trend (Historic - Last 10):  Lab Results   Component Value Date/Time    CREATININE 1.80 (H) 2024 06:29 PM    CREATININE 1.53 (H) 10/30/2024 04:10 AM    CREATININE 1.88 (H) 10/29/2024 09:31 AM    CREATININE 1.90 (H) 10/28/2024 12:14 PM    CREATININE 1.64 (H) 10/27/2024 08:43 AM    CREATININE 1.74 (H) 10/26/2024 11:53 AM    CREATININE 1.97 (H) 10/25/2024 06:21 AM    CREATININE 1.59 (H) 10/24/2024 06:41 AM    CREATININE 1.45 (H) 10/23/2024 05:49 AM    CREATININE 1.51 (H) 10/22/2024 03:07 AM     Height: 170.2 cm  Weight: 103.4 kg  Est CrCl: 35 ml/min (HILARY on CKD 3B?); UO: -- ml/kg/hr  Temp (24hrs), Av.1 °F (37.3 °C), Min:97.6 °F (36.4 °C), Max:102.9 °F (39.4 °C)    Cultures:   Blood x 2 - NGTD - pending    The plan below is expected to result in a target range of Trough 15-20 mcg/mL    Therapy was initiated with a loading dose of 2500 mg IV x 1. As patient likely with HILARY on CKD 3B, will dose by levels for now. Random level tomorrow AM. Pharmacy to follow patient daily and order levels / make dose adjustments as appropriate.      Giovani Crowe, PharmD  Clinical Pharmacist, Orthopedics and Med/Surg  [facility] ([id number])

## 2024-12-17 NOTE — PROGRESS NOTES
Pharmacist Review and Automatic Dose Adjustment of Prophylactic Enoxaparin    *Review reason for admission/hospital problem list*    The reviewing pharmacist has made an adjustment to the ordered enoxaparin dose or converted to UFH per the approved [facility] protocol and table as identified below.        Marilee Oakes is a 74 y.o. female.     Recent Labs     12/16/24  1829   CREATININE 1.80*       Estimated Creatinine Clearance: 35 mL/min (A) (based on SCr of 1.8 mg/dL (H)).    Height:   Ht Readings from Last 1 Encounters:   12/16/24 1.702 m (5' 7\")     Weight:  Wt Readings from Last 1 Encounters:   12/16/24 103.4 kg (228 lb)               Plan: Based upon the patient's weight (103.4 kg) and renal function (CrCl ~ 35 ml/min), the ordered enoxaparin dose of 40 mg SQ daily has been changed to 30 mg SQ BID.      Thank you,  MARTHA TORRES, [facility] MS

## 2024-12-17 NOTE — ED NOTES
Pt is now calm, alert and and oriented, and afebrile.  She is pleasant and conversive.  There has been a dramatic improvement in her clinical condition.

## 2024-12-17 NOTE — H&P
History and Physical    Date of Service:  12/17/2024  Primary Care Provider: Darien Potter MD  Source of information: The patient and Chart review    Chief Complaint: No chief complaint on file.      History of Presenting Illness:   Marilee Oakes is a 74 y.o. female who initially presents to [facility] ER with chills, body ache and cough.  Patient reports that her symptoms have been ongoing for about 3 days.  Patient presented to the emergency room with stable vital signs.  Abnormal labs include BUN of 22 creatinine of 1.8 which is not too far from baseline.  CRP elevated at 10.10.  Also noted elevated alk phos of 195.  Also hemoglobin stable at baseline around 11.  Patient with known history of left foot wound which she has been followed by wound care.  Patient with recent admission from 10/21 to 10/30 for acute osteomyelitis of the left foot and completed course with doxycycline.  Workup in the ER with left ankle x-ray shows chronic appearing changes in the distal tibia-fibula and ankle, no new cortical bone destruction, however given chronic deformities, osteomyelitis is difficult to exclude.  In the ER, blood cultures were obtained, patient was started on cefepime in the ER and hospitalist service requested admit the patient for further evaluation management.    The patient denies any headache, blurry vision, sore throat, trouble swallowing, trouble with speech, chest pain, SOB, cough, fever, chills, N/V/D, abd pain, urinary symptoms, constipation, recent travels, sick contacts, focal or generalized neurological symptoms, falls, injuries, rashes, contact with COVID-19 diagnosed patients, hematemesis, melena, hemoptysis, hematuria, rashes, denies starting any new medications and denies any other concerns or problems besides as mentioned above.         REVIEW OF SYSTEMS:  A comprehensive review of systems was negative except for that written in the History of Present Illness.     Past Medical History:  ability given all available resources at the time of admission. Route is PO if not otherwise noted.     Family and social history were personally reviewed, all pertinent and relevant details are outlined as above.    Objective:   There were no vitals taken for this visit.        PHYSICAL EXAM:   General: Alert x oriented x 3, awake, no acute distress,   HEENT: PEERL, EOMI, moist mucus membranes  Neck: Supple, no JVD, no meningeal signs  Chest: Clear to auscultation bilaterally   CVS: RRR, S1 S2 heard, no murmurs/rubs/gallops  Abd: Soft, non-tender, non-distended, +bowel sounds   Ext: No clubbing, no cyanosis, no edema  Neuro/Psych: Pleasant mood and affect, CN 2-12 grossly intact, sensory grossly within normal limit, Strength 5/5 in all extremities, DTR 1+ x 4  Cap refill: Brisk, less than 3 seconds  Pulses: 2+, symmetric in all extremities  Skin: Warm, dry, without rashes or lesions    Data Review:   I have independently reviewed and interpreted patient's lab and all other diagnostic data    Abnormal Labs Reviewed - No data to display    [unfilled]    IMAGING:   No orders to display        ECG/ECHO:  [unfilled]       Notes reviewed from all clinical/nonclinical/nursing services involved in patient's clinical care. Care coordination discussions were held with appropriate clinical/nonclinical/ nursing providers based on care coordination needs.     Assessment:   Given the patient's current clinical presentation, there is a high level of concern for decompensation if discharged from the emergency department. Complex decision making was performed, which includes reviewing the patient's available past medical records, laboratory results, and imaging studies.    Active Problems:    * No active hospital problems. *  Resolved Problems:    * No resolved hospital problems. *      Plan:     Left foot wound  -Chronic left foot wound, x-ray shows chronic appearing changes in the distal tibia and fibula as well as  ankle, osteomyelitis is difficult to exclude, noted elevated CRP  -Wound care and podiatry to follow  -Will broaden antibiotics with cefepime and vancomycin and de-escalate later as able    Urinary tract infection  -Urinalysis shows moderate blood, large leukocyte esterase, pyuria and bacteriuria  -Continue with antibiotics as above, follow urine culture    Hypertension  -Will hold antihypertensives for now since blood pressure is borderline low  -Monitor blood pressure    Elevated alk phos  -Likely source from the bones, noted cortical destruction on the left foot x-ray  -Continue monitor    Asthma  -Stable without exacerbation  -Continue home bronchodilators  -Breathing treatment as needed    CHF  -Patient with chronic CHF, last echo with normal EF  -Appears compensated, continue Bumex from home    CKD stage III  -Creatinine stable at baseline  -Continue monitor    Estimated length of stay is 2 midnights.    DIET: No diet orders on file   ISOLATION PRECAUTIONS: No active isolations  CODE STATUS: Prior   Central Line:     DVT PROPHYLAXIS: Lovenox  FUNCTIONAL STATUS PRIOR TO HOSPITALIZATION: Fully active and ambulatory; able to carry on all self-care without restriction.  Ambulatory status/function: By self   EARLY MOBILITY ASSESSMENT: Recommend an assessment from physical therapy and/or occupational therapy  ANTICIPATED DISCHARGE: 24-48 hours.  ANTICIPATED DISPOSITION: Home  EMERGENCY CONTACT/SURROGATE DECISION MAKER:     CRITICAL CARE WAS PERFORMED FOR THIS ENCOUNTER: NO.      Signed By: Estuardo Johnson MD     December 17, 2024         Please note that this dictation may have been completed with Dragon, the [employer] voice recognition software.  Quite often unanticipated grammatical, syntax, homophones, and other interpretive errors are inadvertently transcribed by the computer software.  Please disregard these errors.  Please excuse any errors that have escaped final proofreading.

## 2024-12-18 ENCOUNTER — APPOINTMENT (OUTPATIENT)
Facility: HOSPITAL | Age: 74
DRG: 638 | End: 2024-12-18
Attending: INTERNAL MEDICINE
Payer: MEDICARE

## 2024-12-18 LAB
ANION GAP SERPL CALC-SCNC: 8 MMOL/L (ref 2–12)
BACTERIA SPEC CULT: ABNORMAL
BACTERIA SPEC CULT: ABNORMAL
BUN SERPL-MCNC: 24 MG/DL (ref 6–20)
BUN/CREAT SERPL: 16 (ref 12–20)
CALCIUM SERPL-MCNC: 9.4 MG/DL (ref 8.5–10.1)
CC UR VC: ABNORMAL
CHLORIDE SERPL-SCNC: 106 MMOL/L (ref 97–108)
CO2 SERPL-SCNC: 24 MMOL/L (ref 21–32)
CREAT SERPL-MCNC: 1.51 MG/DL (ref 0.55–1.02)
D DIMER PPP FEU-MCNC: 2.32 MG/L FEU (ref 0–0.65)
EKG ATRIAL RATE: 61 BPM
EKG DIAGNOSIS: NORMAL
EKG Q-T INTERVAL: 470 MS
EKG QRS DURATION: 152 MS
EKG QTC CALCULATION (BAZETT): 542 MS
EKG R AXIS: 251 DEGREES
EKG T AXIS: 46 DEGREES
EKG VENTRICULAR RATE: 80 BPM
GLUCOSE BLD STRIP.AUTO-MCNC: 134 MG/DL (ref 65–117)
GLUCOSE BLD STRIP.AUTO-MCNC: 148 MG/DL (ref 65–117)
GLUCOSE BLD STRIP.AUTO-MCNC: 158 MG/DL (ref 65–117)
GLUCOSE BLD STRIP.AUTO-MCNC: 166 MG/DL (ref 65–117)
GLUCOSE SERPL-MCNC: 144 MG/DL (ref 65–100)
NT PRO BNP: ABNORMAL PG/ML
POTASSIUM SERPL-SCNC: 2.6 MMOL/L (ref 3.5–5.1)
POTASSIUM SERPL-SCNC: 3.1 MMOL/L (ref 3.5–5.1)
SERVICE CMNT-IMP: ABNORMAL
SODIUM SERPL-SCNC: 138 MMOL/L (ref 136–145)
TROPONIN I SERPL HS-MCNC: 377 NG/L (ref 0–51)
TROPONIN I SERPL HS-MCNC: 500 NG/L (ref 0–51)
VANCOMYCIN SERPL-MCNC: 13.7 UG/ML

## 2024-12-18 PROCEDURE — 6360000002 HC RX W HCPCS: Performed by: FAMILY MEDICINE

## 2024-12-18 PROCEDURE — 6370000000 HC RX 637 (ALT 250 FOR IP): Performed by: FAMILY MEDICINE

## 2024-12-18 PROCEDURE — 05HB33Z INSERTION OF INFUSION DEVICE INTO RIGHT BASILIC VEIN, PERCUTANEOUS APPROACH: ICD-10-PCS | Performed by: INTERNAL MEDICINE

## 2024-12-18 PROCEDURE — 2580000003 HC RX 258: Performed by: FAMILY MEDICINE

## 2024-12-18 PROCEDURE — 99223 1ST HOSP IP/OBS HIGH 75: CPT | Performed by: INTERNAL MEDICINE

## 2024-12-18 PROCEDURE — 6360000004 HC RX CONTRAST MEDICATION: Performed by: STUDENT IN AN ORGANIZED HEALTH CARE EDUCATION/TRAINING PROGRAM

## 2024-12-18 PROCEDURE — 6360000002 HC RX W HCPCS

## 2024-12-18 PROCEDURE — 2500000003 HC RX 250 WO HCPCS: Performed by: FAMILY MEDICINE

## 2024-12-18 PROCEDURE — 6370000000 HC RX 637 (ALT 250 FOR IP)

## 2024-12-18 PROCEDURE — 36415 COLL VENOUS BLD VENIPUNCTURE: CPT

## 2024-12-18 PROCEDURE — 80202 ASSAY OF VANCOMYCIN: CPT

## 2024-12-18 PROCEDURE — 71275 CT ANGIOGRAPHY CHEST: CPT

## 2024-12-18 PROCEDURE — 93005 ELECTROCARDIOGRAM TRACING: CPT

## 2024-12-18 PROCEDURE — 85379 FIBRIN DEGRADATION QUANT: CPT

## 2024-12-18 PROCEDURE — 83880 ASSAY OF NATRIURETIC PEPTIDE: CPT

## 2024-12-18 PROCEDURE — 80048 BASIC METABOLIC PNL TOTAL CA: CPT

## 2024-12-18 PROCEDURE — 84484 ASSAY OF TROPONIN QUANT: CPT

## 2024-12-18 PROCEDURE — 2060000000 HC ICU INTERMEDIATE R&B

## 2024-12-18 PROCEDURE — 93970 EXTREMITY STUDY: CPT

## 2024-12-18 PROCEDURE — 94640 AIRWAY INHALATION TREATMENT: CPT

## 2024-12-18 PROCEDURE — 82962 GLUCOSE BLOOD TEST: CPT

## 2024-12-18 PROCEDURE — 84132 ASSAY OF SERUM POTASSIUM: CPT

## 2024-12-18 PROCEDURE — 87040 BLOOD CULTURE FOR BACTERIA: CPT

## 2024-12-18 RX ORDER — DEXTROSE MONOHYDRATE 100 MG/ML
INJECTION, SOLUTION INTRAVENOUS CONTINUOUS PRN
Status: DISCONTINUED | OUTPATIENT
Start: 2024-12-18 | End: 2024-12-24 | Stop reason: HOSPADM

## 2024-12-18 RX ORDER — AMLODIPINE BESYLATE 5 MG/1
5 TABLET ORAL DAILY
Status: DISCONTINUED | OUTPATIENT
Start: 2024-12-18 | End: 2024-12-24 | Stop reason: HOSPADM

## 2024-12-18 RX ORDER — IOPAMIDOL 755 MG/ML
100 INJECTION, SOLUTION INTRAVASCULAR
Status: COMPLETED | OUTPATIENT
Start: 2024-12-18 | End: 2024-12-18

## 2024-12-18 RX ORDER — CARVEDILOL 12.5 MG/1
25 TABLET ORAL 2 TIMES DAILY WITH MEALS
Status: DISCONTINUED | OUTPATIENT
Start: 2024-12-18 | End: 2024-12-24 | Stop reason: HOSPADM

## 2024-12-18 RX ORDER — NYSTATIN 100000 U/G
CREAM TOPICAL 2 TIMES DAILY
COMMUNITY

## 2024-12-18 RX ORDER — INSULIN LISPRO 100 [IU]/ML
0-8 INJECTION, SOLUTION INTRAVENOUS; SUBCUTANEOUS
Status: DISCONTINUED | OUTPATIENT
Start: 2024-12-18 | End: 2024-12-24 | Stop reason: HOSPADM

## 2024-12-18 RX ORDER — ARFORMOTEROL TARTRATE 15 UG/2ML
15 SOLUTION RESPIRATORY (INHALATION)
Status: DISCONTINUED | OUTPATIENT
Start: 2024-12-18 | End: 2024-12-24 | Stop reason: HOSPADM

## 2024-12-18 RX ORDER — VANCOMYCIN 1 G/200ML
1000 INJECTION, SOLUTION INTRAVENOUS EVERY 24 HOURS
Status: DISCONTINUED | OUTPATIENT
Start: 2024-12-19 | End: 2024-12-22

## 2024-12-18 RX ORDER — BUDESONIDE 0.25 MG/2ML
0.25 INHALANT ORAL
Status: DISCONTINUED | OUTPATIENT
Start: 2024-12-18 | End: 2024-12-24 | Stop reason: HOSPADM

## 2024-12-18 RX ORDER — POTASSIUM CHLORIDE 7.45 MG/ML
10 INJECTION INTRAVENOUS
Status: DISPENSED | OUTPATIENT
Start: 2024-12-18 | End: 2024-12-18

## 2024-12-18 RX ORDER — BENZONATATE 100 MG/1
100 CAPSULE ORAL 3 TIMES DAILY PRN
COMMUNITY

## 2024-12-18 RX ORDER — POTASSIUM CHLORIDE 7.45 MG/ML
10 INJECTION INTRAVENOUS
Status: COMPLETED | OUTPATIENT
Start: 2024-12-18 | End: 2024-12-18

## 2024-12-18 RX ORDER — OXYBUTYNIN CHLORIDE 5 MG/1
5 TABLET ORAL 2 TIMES DAILY
COMMUNITY

## 2024-12-18 RX ORDER — AMLODIPINE BESYLATE 5 MG/1
5 TABLET ORAL DAILY
COMMUNITY

## 2024-12-18 RX ADMIN — ARFORMOTEROL TARTRATE 15 MCG: 15 SOLUTION RESPIRATORY (INHALATION) at 21:10

## 2024-12-18 RX ADMIN — IPRATROPIUM BROMIDE 0.5 MG: 0.5 SOLUTION RESPIRATORY (INHALATION) at 21:10

## 2024-12-18 RX ADMIN — IOPAMIDOL 100 ML: 755 INJECTION, SOLUTION INTRAVENOUS at 22:19

## 2024-12-18 RX ADMIN — POTASSIUM CHLORIDE 40 MEQ: 750 TABLET, FILM COATED, EXTENDED RELEASE ORAL at 21:22

## 2024-12-18 RX ADMIN — IPRATROPIUM BROMIDE 0.5 MG: 0.5 SOLUTION RESPIRATORY (INHALATION) at 08:03

## 2024-12-18 RX ADMIN — ENOXAPARIN SODIUM 30 MG: 100 INJECTION SUBCUTANEOUS at 21:22

## 2024-12-18 RX ADMIN — POTASSIUM CHLORIDE 10 MEQ: 7.46 INJECTION, SOLUTION INTRAVENOUS at 18:19

## 2024-12-18 RX ADMIN — AMLODIPINE BESYLATE 5 MG: 5 TABLET ORAL at 18:19

## 2024-12-18 RX ADMIN — VANCOMYCIN HYDROCHLORIDE 1000 MG: 1 INJECTION, POWDER, LYOPHILIZED, FOR SOLUTION INTRAVENOUS at 13:20

## 2024-12-18 RX ADMIN — BUDESONIDE 250 MCG: 0.25 INHALANT RESPIRATORY (INHALATION) at 21:10

## 2024-12-18 RX ADMIN — POTASSIUM CHLORIDE 10 MEQ: 7.46 INJECTION, SOLUTION INTRAVENOUS at 14:58

## 2024-12-18 RX ADMIN — POTASSIUM CHLORIDE 40 MEQ: 750 TABLET, FILM COATED, EXTENDED RELEASE ORAL at 09:04

## 2024-12-18 RX ADMIN — POTASSIUM CHLORIDE 10 MEQ: 7.46 INJECTION, SOLUTION INTRAVENOUS at 17:17

## 2024-12-18 RX ADMIN — SODIUM CHLORIDE, PRESERVATIVE FREE 10 ML: 5 INJECTION INTRAVENOUS at 21:22

## 2024-12-18 RX ADMIN — PREGABALIN 75 MG: 75 CAPSULE ORAL at 08:12

## 2024-12-18 RX ADMIN — ARFORMOTEROL TARTRATE 15 MCG: 15 SOLUTION RESPIRATORY (INHALATION) at 08:03

## 2024-12-18 RX ADMIN — ENOXAPARIN SODIUM 30 MG: 100 INJECTION SUBCUTANEOUS at 08:12

## 2024-12-18 RX ADMIN — QUETIAPINE FUMARATE 25 MG: 25 TABLET ORAL at 22:38

## 2024-12-18 RX ADMIN — POTASSIUM CHLORIDE 10 MEQ: 7.46 INJECTION, SOLUTION INTRAVENOUS at 14:10

## 2024-12-18 RX ADMIN — FERROUS SULFATE TAB 325 MG (65 MG ELEMENTAL FE) 325 MG: 325 (65 FE) TAB at 08:12

## 2024-12-18 RX ADMIN — SODIUM CHLORIDE, PRESERVATIVE FREE 10 ML: 5 INJECTION INTRAVENOUS at 08:13

## 2024-12-18 RX ADMIN — BUDESONIDE 250 MCG: 0.25 INHALANT RESPIRATORY (INHALATION) at 08:03

## 2024-12-18 RX ADMIN — BUMETANIDE 1 MG: 1 TABLET ORAL at 08:12

## 2024-12-18 RX ADMIN — CARVEDILOL 25 MG: 12.5 TABLET, FILM COATED ORAL at 18:19

## 2024-12-18 RX ADMIN — ASPIRIN 81 MG CHEWABLE TABLET 81 MG: 81 TABLET CHEWABLE at 08:12

## 2024-12-18 RX ADMIN — SODIUM CHLORIDE: 9 INJECTION, SOLUTION INTRAVENOUS at 17:17

## 2024-12-18 NOTE — PROGRESS NOTES
RN unable to hang antibiotics due to loss of IV access. Pt has been stuck multiple times with no luck. RN asked MD for a midline order. RN called and left message for PICC team.

## 2024-12-18 NOTE — PLAN OF CARE
Problem: Chronic Conditions and Co-morbidities  Goal: Patient's chronic conditions and co-morbidity symptoms are monitored and maintained or improved  12/18/2024 0935 by Daniela Burrell RN  Outcome: Progressing  12/18/2024 0332 by Aranza Golden LPN  Outcome: Progressing     Problem: Discharge Planning  Goal: Discharge to home or other facility with appropriate resources  12/18/2024 0935 by Daniela Burrell RN  Outcome: Progressing  12/18/2024 0332 by Aranza Golden LPN  Outcome: Progressing     Problem: Safety - Adult  Goal: Free from fall injury  12/18/2024 0935 by Daniela Burrell RN  Outcome: Progressing  12/18/2024 0332 by Aranza Golden LPN  Outcome: Progressing

## 2024-12-18 NOTE — CONSULTS
S Cardiology Consult Note    Date of consult:  12/18/24  Date of admission: 12/17/2024  Primary Cardiologist: Dr Bruce Abbott  Physician Requesting consult: Destiny Regan    CC / Reason for consult: Elevated troponin,     History of the presenting illness:  Marilee Oakes is a 74 y.o. F who presented to the ER with flu like symptoms, fever, chills, body aches x 2 days. She has been having cough productive of sputum as well. She feels somewhat short of breath.    She has chronic left foot wound issues and osteomyeltitis.   With concerns for sepsis she has been admitted and started on antibiotics.    She denies chest pain.    Cardiac history includes:  CAD with remote history of PCI - stable  Chronic atrial fibrillation s/p pacemaker and AVN ablation    Past Medical History:   Diagnosis Date    Acute and chronic respiratory failure with hypoxia     Age-related osteoporosis without current pathological fracture     Age-related osteoporosis without current pathological fracture     Anemia in chronic kidney disease     Anxiety and depression 01/22/2018    Arthritis     OA    Asthma     UNCOMPLICATED    Atherosclerotic heart disease of native coronary artery with angina pectoris (HCC)     Atrial fibrillation (HCC)     CAD (coronary artery disease)     CHF (congestive heart failure) ([facility])     Chronic back pain     Chronic systolic heart failure (HCC)     CKD stage G3b/A1, GFR 30-44 and albumin creatinine ratio <30 mg/g (HCC)     STAGE 3B    COPD (chronic obstructive pulmonary disease) ([facility])     WITH (ACUTE) EXACERATION    Dependence on renal dialysis (HCC)     Dependence on supplemental oxygen     Difficulty in walking, not elsewhere classified     Essential hypertension     GERD (gastroesophageal reflux disease)     Hemorrhoids     PAIN    History of diverticulitis     diverticulitis    History of echocardiogram 11/2021    LV: Estimated LVEF is 40 - 45%. Visually measured ejection fraction. Normal cavity size and  wall thickness. Globally reduced systolic function.  LA: Moderately dilated left atrium. Left atrial appendage is completely occluded. A Watchman left atrial appendage occluder is present and completely occludes the appendage.    History of migraine     History of MRSA infection     Hypercholesterolemia 01/22/2018    Hypertensive heart and chronic kidney disease with heart failure and stage 1 through stage 4 chronic kidney disease, or unspecified chronic kidney disease (HCC)     Immunodeficiency, unspecified (HCC)     Irritable bowel syndrome     IBS, spinal stenosis    Long term (current) use of insulin (HCC)     Long-term use of high-risk medication 01/22/2018    Lumbar spinal stenosis     Metabolic encephalopathy     Morbid (severe) obesity due to excess calories     Muscle weakness (generalized)     Neuropathy     Obesity (BMI 30-39.9) 04/30/2019    Obstructive sleep apnea     uses CPAP    Permanent atrial fibrillation ([facility])     Presence of implantable cardioverter-defibrillator (ICD)     Presence of other cardiac implants and grafts     PACEMAKER    Presence of Watchman left atrial appendage closure device     Primary generalized (osteo)arthritis     Spinal stenosis, lumbar region, without neurogenic claudication     Thrombocytopenia (HCC)     Type 2 diabetes mellitus with diabetic neuropathy, without long-term current use of insulin ([facility]) 06/05/2016    Venous insufficiency     Vitamin B12 deficiency        Prior to Admission medications    Medication Sig Start Date End Date Taking? Authorizing Provider   potassium chloride (KLOR-CON M) 20 MEQ extended release tablet Take 1 tablet by mouth 2 times daily 11/12/24   Darien Potter MD   QUEtiapine (SEROQUEL) 25 MG tablet Take 1 tablet by mouth nightly 11/5/24   Darien Potter MD   bumetanide (BUMEX) 1 MG tablet Take 1 tablet by mouth daily 10/31/24   Lazaro Nieves MD   omeprazole (PRILOSEC) 40 MG delayed release capsule Take 1 capsule by mouth daily  24*   CREATININE 1.51*   GLUCOSE 144*   CALCIUM 9.4        CBC:  Lab Results   Component Value Date/Time    WBC 9.0 12/16/2024 06:29 PM    HGB 11.0 12/16/2024 06:29 PM    HCTPOC 34 12/03/2019 01:27 PM    HCT 34.8 12/16/2024 06:29 PM     12/16/2024 06:29 PM    MCV 86.4 12/16/2024 06:29 PM     Troponin- 500  CRP 10.1    Blood Cultures:   12/16/24: +Staph aureus    Urine Culture:  12/16/24: +E Coli      Data review:  EKG tracing personally reviewed:   Ventricular paced rhythm    Echocardiogram:  04/03/24    JUAN MANUEL (PRN CONTRAST/BUBBLE/3D) 04/09/2024 12:24 PM (Final)    Interpretation Summary    Image quality is good.    Procedure: JUAN MANUEL (complete, 2D, doppler color flow).    Date: 4/9/2024  Moderate sedation start time: 1045  Moderate sedation stop time: 1108  Sedation used: versed; fentanyl.  Sedation was administered by the nurse; I directly supervised the nurse as the patient was monitored for the duration of the procedure.    Indication: Bacteremia    After informed consent of potential complications, the patient was mildly sedated per nursing flow sheet. Posterior pharynx was anesthetized with viscous lidocaine and cetacaine spray. Esophagus was easily intubated with the JUAN MANUEL probe and appropriate images were obtained. Color flow and doppler were used. Patient remained hemodynamically stable throughout procedure. No blood loss or specimens. No apparent complications.    Findings:  1) Normal LV size and function. EF of 55-60%. Moderate LVH.  2) Normal RV size and function.  3) Trileaflet, probably functionally bicuspid aortic valve; Mild AS (mean 24); trivial aortic regurgitation.  4) Structurally normal mitral valve; No mitral stenosis; mild mitral regurgitation.  5) Structurally normal tricuspid valve; No tricuspid stenosis; Mild tricuspid regurgitation; TR gradient 47.  6) Pulmonary valve appears structurally normal.  7) LA enlargement  8) No intra-cardiac shunt is seen on color doppler.  9) No intra-cardiac

## 2024-12-18 NOTE — PROGRESS NOTES
Pharmacist Note - Vancomycin Dosing  Therapy day 2  Indication: left ankle infection  Current regimen: TBD    Recent Labs     12/16/24  1829 12/18/24  0625   WBC 9.0  --    CREATININE 1.80* 1.51*   BUN 22* 24*       A random vancomycin level of 13.7 mcg/mL was obtained 27 hours post-loading dose.     Goal target range AUC/LILIANA 400-600      Plan: Change to vancomycin 1000 mg IV Q24H for predicted AUC/LILIANA 539 . Pharmacy will continue to monitor this patient daily for changes in clinical status and renal function.      *Random vancomycin levels are used to calculate AUC/LILIANA, this level should not be interpreted as a trough. Vancomycin has been dosed using Bayesian kinetics software to target an AUC24:LILIANA of 400-600, which provides adequate exposure for as assumed infection due to MRSA with an LILIANA of 1 or less while reducing the risk of nephrotoxicity as seen with traditional trough based dosing goals.

## 2024-12-18 NOTE — WOUND CARE
Consulted for left foot.  Admitted for sepsis & seen by podiatry, Dr. Noble, whose notes indicate deferment to ortho, Dr. Maame Chan.  Perfect Serve sent to Dr. Chan informing her of admission with updated photos in the chart.     No open wounds.               Heels intact with crust on left posterior heel left open to air.           She lifts legs independently; offloaded with pillows.   at bedside.     No wound care needs - will sign off.    Acacia Martínez, WENDY

## 2024-12-18 NOTE — PROGRESS NOTES
[facility] Adult  Hospitalist Group                                                                                          Hospitalist Progress Note  Destiny Regan PA-C  Answering service: [phone] OR 1946 from in house phone        Date of Service:  2024  NAME:  Marilee Oakes  :  1950  MRN:  [id number]       Admission Summary:   Marilee Oakes is a 74 y.o. female who initially presented to [facility] ER on 2024 with chills, body ache and cough.  Patient reported that her symptoms have been ongoing for about 3 days prior to arrival.  Patient presented to the emergency room with stable vital signs.  Abnormal labs include BUN of 22 creatinine of 1.8 which is not too far from baseline.  CRP elevated at 10.10.  Also noted elevated alk phos of 195.  Also hemoglobin stable at baseline around 11.  Patient with known history of left foot wound which she has been followed by wound care.  Patient with recent admission from 10/21 to 10/30 for acute osteomyelitis of the left foot and completed course with doxycycline.  Workup in the ER with left ankle x-ray shows chronic appearing changes in the distal tibia-fibula and ankle, no new cortical bone destruction, however given chronic deformities, osteomyelitis was difficult to exclude.  In the ER, blood cultures were obtained, patient was started on cefepime in the ER and hospitalist service requested admit the patient for further evaluation management.  Interval history / Subjective:   Upon seeing the patient on rounds, patient was laying in bed.  at bedside. Patient states she has acute tenderness on the plantar surface of the heel and has had this pain for the last 3 days.  at bedside states she \"can't seem to get this foot right\" as she has had many complications related to it. Patient denies fever, chills, CP and abdominal pain.     Patient sitting up in bed on 5L of oxygen via NC. Upon asking patient about oxygen use,  Examination:     I had a face to face encounter with this patient and independently examined them on 12/18/2024 as outlined below:    General : alert x 3, awake, moderate distress, NC in place  HEENT: PEERL, EOMI, moist mucus membrane  Neck: supple, no JVD  Chest: Clear to auscultation bilaterally   CVS: S1 S2 heard  Abd: soft/ non tender, non distended  Ext: no clubbing, no cyanosis, no edema  Neuro/Psych: pleasant mood and affect,  sensory grossly within normal limit  Skin: warm, quarter sized erythematous area on plantar surface of heel, old surgical scars, dry skin diffusely    Data Review:    Review and/or order of clinical lab test  Review and/or order of tests in the radiology section of CPT  Review and/or order of tests in the medicine section of CPT    I have personally and independently reviewed all pertinent labs, diagnostic studies, imaging, and have provided independent interpretation of the same.     Labs:     Recent Labs     12/16/24 1829   WBC 9.0   HGB 11.0*   HCT 34.8*        Recent Labs     12/16/24  1829 12/18/24  0625    138   K 3.6 2.6*    106   CO2 27 24   BUN 22* 24*     Recent Labs     12/16/24  1829   ALT 10*   GLOB 4.2*     Lab Results   Component Value Date/Time    CHOL 99 10/27/2023 09:24 AM    HDL 33 10/27/2023 09:24 AM    LDL 38.2 10/27/2023 09:24 AM     Lab Results   Component Value Date/Time    GLUCPOC 287 12/03/2019 01:27 PM     Notes reviewed from all clinical/nonclinical/nursing services involved in patient's clinical care. Care coordination discussions were held with appropriate clinical/nonclinical/ nursing providers based on care coordination needs.     Patients current active Medications were reviewed, considered, added and adjusted based on the clinical condition today.      Home Medications were reconciled to the best of my ability given all available resources at the time of admission. Route is PO if not otherwise noted.      Medications Reviewed:

## 2024-12-18 NOTE — CARE COORDINATION
ANGELIQUE:    CM reviewed chart and attended rounds; following for potential CM needs pending medical progress.     Podiatry following, IV ABX  --    Patient uses a walker at baseline but also owns w/c, has a ramp, H bed, BSC, and rollator. She does not drive but lives with  who drives her to appointment.     Other services:   [facility] current (open for SN, PT, and OT)  [facility] for home O2 current  [facility] prior  [facility] SNF, [facility], [facility] SNF prior    Transition of Care Plan:    RUR: 32%  Prior Level of Functioning: home with HH and 's help also prior SNFs  Disposition: tbd  ASHANTI: 12/23  If SNF or IPR: Date FOC offered:   Date FOC received:   Accepting facility:   Date authorization started with reference number:   Date authorization received and expires:   Follow up appointments:   DME needed: none  Transportation at discharge: family or BLS  IM/IMM Medicare/ letter given: y  Is patient a [place] and connected with [facility]?    If yes, was [facility] transfer form completed and [facility] notified?   Caregiver Contact:  Wesley Oakes [phone]   Discharge Caregiver contacted prior to discharge?   Care Conference needed?   Barriers to discharge:  clinical

## 2024-12-19 ENCOUNTER — TELEPHONE (OUTPATIENT)
Age: 74
End: 2024-12-19

## 2024-12-19 PROBLEM — A41.9 SEVERE SEPSIS (HCC): Status: ACTIVE | Noted: 2024-12-19

## 2024-12-19 PROBLEM — R65.20 SEVERE SEPSIS (HCC): Status: ACTIVE | Noted: 2024-12-19

## 2024-12-19 LAB
ALBUMIN SERPL-MCNC: 2.4 G/DL (ref 3.5–5)
ALBUMIN/GLOB SERPL: 0.7 (ref 1.1–2.2)
ALP SERPL-CCNC: 176 U/L (ref 45–117)
ALT SERPL-CCNC: 19 U/L (ref 12–78)
ANION GAP SERPL CALC-SCNC: 4 MMOL/L (ref 2–12)
ARTERIAL PATENCY WRIST A: POSITIVE
AST SERPL-CCNC: 18 U/L (ref 15–37)
BACTERIA SPEC CULT: ABNORMAL
BASE EXCESS BLD CALC-SCNC: 2 MMOL/L
BASOPHILS # BLD: 0 K/UL (ref 0–0.1)
BASOPHILS NFR BLD: 1 % (ref 0–1)
BDY SITE: NORMAL
BILIRUB SERPL-MCNC: 0.8 MG/DL (ref 0.2–1)
BUN SERPL-MCNC: 24 MG/DL (ref 6–20)
BUN/CREAT SERPL: 16 (ref 12–20)
CALCIUM SERPL-MCNC: 9.3 MG/DL (ref 8.5–10.1)
CHLORIDE SERPL-SCNC: 108 MMOL/L (ref 97–108)
CO2 SERPL-SCNC: 28 MMOL/L (ref 21–32)
CREAT SERPL-MCNC: 1.48 MG/DL (ref 0.55–1.02)
DIFFERENTIAL METHOD BLD: ABNORMAL
ECHO BSA: 2.2 M2
EOSINOPHIL # BLD: 0.2 K/UL (ref 0–0.4)
EOSINOPHIL NFR BLD: 4 % (ref 0–7)
ERYTHROCYTE [DISTWIDTH] IN BLOOD BY AUTOMATED COUNT: 15.7 % (ref 11.5–14.5)
GAS FLOW.O2 O2 DELIVERY SYS: NORMAL
GLOBULIN SER CALC-MCNC: 3.5 G/DL (ref 2–4)
GLUCOSE BLD STRIP.AUTO-MCNC: 136 MG/DL (ref 65–117)
GLUCOSE BLD STRIP.AUTO-MCNC: 179 MG/DL (ref 65–117)
GLUCOSE BLD STRIP.AUTO-MCNC: 182 MG/DL (ref 65–117)
GLUCOSE BLD STRIP.AUTO-MCNC: 200 MG/DL (ref 65–117)
GLUCOSE SERPL-MCNC: 115 MG/DL (ref 65–100)
HCO3 BLD-SCNC: 26.4 MMOL/L (ref 21–28)
HCT VFR BLD AUTO: 28.8 % (ref 35–47)
HGB BLD-MCNC: 9 G/DL (ref 11.5–16)
IMM GRANULOCYTES # BLD AUTO: 0 K/UL (ref 0–0.04)
IMM GRANULOCYTES NFR BLD AUTO: 1 % (ref 0–0.5)
LYMPHOCYTES # BLD: 0.7 K/UL (ref 0.8–3.5)
LYMPHOCYTES NFR BLD: 18 % (ref 12–49)
MCH RBC QN AUTO: 27.3 PG (ref 26–34)
MCHC RBC AUTO-ENTMCNC: 31.3 G/DL (ref 30–36.5)
MCV RBC AUTO: 87.3 FL (ref 80–99)
MONOCYTES # BLD: 0.4 K/UL (ref 0–1)
MONOCYTES NFR BLD: 9 % (ref 5–13)
NEUTS SEG # BLD: 2.8 K/UL (ref 1.8–8)
NEUTS SEG NFR BLD: 67 % (ref 32–75)
NRBC # BLD: 0 K/UL (ref 0–0.01)
NRBC BLD-RTO: 0 PER 100 WBC
PCO2 BLD: 39.7 MMHG (ref 35–48)
PH BLD: 7.43 (ref 7.35–7.45)
PLATELET # BLD AUTO: 161 K/UL (ref 150–400)
PMV BLD AUTO: 10.5 FL (ref 8.9–12.9)
PO2 BLD: 86 MMHG (ref 83–108)
POTASSIUM SERPL-SCNC: 3.4 MMOL/L (ref 3.5–5.1)
PROT SERPL-MCNC: 5.9 G/DL (ref 6.4–8.2)
RBC # BLD AUTO: 3.3 M/UL (ref 3.8–5.2)
RBC MORPH BLD: ABNORMAL
SAO2 % BLD: 96.8 % (ref 92–97)
SERVICE CMNT-IMP: ABNORMAL
SODIUM SERPL-SCNC: 140 MMOL/L (ref 136–145)
SPECIMEN TYPE: NORMAL
WBC # BLD AUTO: 4.1 K/UL (ref 3.6–11)

## 2024-12-19 PROCEDURE — 82962 GLUCOSE BLOOD TEST: CPT

## 2024-12-19 PROCEDURE — APPSS30 APP SPLIT SHARED TIME 16-30 MINUTES: Performed by: NURSE PRACTITIONER

## 2024-12-19 PROCEDURE — 97530 THERAPEUTIC ACTIVITIES: CPT

## 2024-12-19 PROCEDURE — 1100000000 HC RM PRIVATE

## 2024-12-19 PROCEDURE — 36600 WITHDRAWAL OF ARTERIAL BLOOD: CPT

## 2024-12-19 PROCEDURE — 6370000000 HC RX 637 (ALT 250 FOR IP): Performed by: FAMILY MEDICINE

## 2024-12-19 PROCEDURE — 6360000002 HC RX W HCPCS: Performed by: FAMILY MEDICINE

## 2024-12-19 PROCEDURE — 2500000003 HC RX 250 WO HCPCS: Performed by: FAMILY MEDICINE

## 2024-12-19 PROCEDURE — 82803 BLOOD GASES ANY COMBINATION: CPT

## 2024-12-19 PROCEDURE — 97535 SELF CARE MNGMENT TRAINING: CPT

## 2024-12-19 PROCEDURE — 6370000000 HC RX 637 (ALT 250 FOR IP)

## 2024-12-19 PROCEDURE — 97165 OT EVAL LOW COMPLEX 30 MIN: CPT

## 2024-12-19 PROCEDURE — 94640 AIRWAY INHALATION TREATMENT: CPT

## 2024-12-19 PROCEDURE — 97161 PT EVAL LOW COMPLEX 20 MIN: CPT

## 2024-12-19 PROCEDURE — 85025 COMPLETE CBC W/AUTO DIFF WBC: CPT

## 2024-12-19 PROCEDURE — 2580000003 HC RX 258: Performed by: FAMILY MEDICINE

## 2024-12-19 PROCEDURE — 99233 SBSQ HOSP IP/OBS HIGH 50: CPT | Performed by: INTERNAL MEDICINE

## 2024-12-19 PROCEDURE — 80053 COMPREHEN METABOLIC PANEL: CPT

## 2024-12-19 RX ORDER — BUMETANIDE 1 MG/1
1 TABLET ORAL 2 TIMES DAILY
Status: DISCONTINUED | OUTPATIENT
Start: 2024-12-19 | End: 2024-12-24 | Stop reason: HOSPADM

## 2024-12-19 RX ADMIN — IPRATROPIUM BROMIDE 0.5 MG: 0.5 SOLUTION RESPIRATORY (INHALATION) at 20:56

## 2024-12-19 RX ADMIN — MICONAZOLE NITRATE: 2 POWDER TOPICAL at 21:55

## 2024-12-19 RX ADMIN — POTASSIUM CHLORIDE 40 MEQ: 750 TABLET, FILM COATED, EXTENDED RELEASE ORAL at 07:16

## 2024-12-19 RX ADMIN — INSULIN LISPRO 2 UNITS: 100 INJECTION, SOLUTION INTRAVENOUS; SUBCUTANEOUS at 11:58

## 2024-12-19 RX ADMIN — INSULIN LISPRO 2 UNITS: 100 INJECTION, SOLUTION INTRAVENOUS; SUBCUTANEOUS at 17:19

## 2024-12-19 RX ADMIN — ARFORMOTEROL TARTRATE 15 MCG: 15 SOLUTION RESPIRATORY (INHALATION) at 08:11

## 2024-12-19 RX ADMIN — CARVEDILOL 25 MG: 12.5 TABLET, FILM COATED ORAL at 08:19

## 2024-12-19 RX ADMIN — ASPIRIN 81 MG CHEWABLE TABLET 81 MG: 81 TABLET CHEWABLE at 08:19

## 2024-12-19 RX ADMIN — BUMETANIDE 1 MG: 1 TABLET ORAL at 17:21

## 2024-12-19 RX ADMIN — ARFORMOTEROL TARTRATE 15 MCG: 15 SOLUTION RESPIRATORY (INHALATION) at 20:55

## 2024-12-19 RX ADMIN — IPRATROPIUM BROMIDE 0.5 MG: 0.5 SOLUTION RESPIRATORY (INHALATION) at 08:11

## 2024-12-19 RX ADMIN — BUDESONIDE 250 MCG: 0.25 INHALANT RESPIRATORY (INHALATION) at 08:11

## 2024-12-19 RX ADMIN — ENOXAPARIN SODIUM 30 MG: 100 INJECTION SUBCUTANEOUS at 20:48

## 2024-12-19 RX ADMIN — BUMETANIDE 1 MG: 1 TABLET ORAL at 08:19

## 2024-12-19 RX ADMIN — PREGABALIN 75 MG: 75 CAPSULE ORAL at 08:19

## 2024-12-19 RX ADMIN — ACETAMINOPHEN 650 MG: 325 TABLET ORAL at 20:48

## 2024-12-19 RX ADMIN — CARVEDILOL 25 MG: 12.5 TABLET, FILM COATED ORAL at 17:21

## 2024-12-19 RX ADMIN — VANCOMYCIN 1000 MG: 1 INJECTION, SOLUTION INTRAVENOUS at 13:56

## 2024-12-19 RX ADMIN — FERROUS SULFATE TAB 325 MG (65 MG ELEMENTAL FE) 325 MG: 325 (65 FE) TAB at 08:19

## 2024-12-19 RX ADMIN — ENOXAPARIN SODIUM 30 MG: 100 INJECTION SUBCUTANEOUS at 08:19

## 2024-12-19 RX ADMIN — PANTOPRAZOLE SODIUM 40 MG: 40 TABLET, DELAYED RELEASE ORAL at 07:16

## 2024-12-19 RX ADMIN — SODIUM CHLORIDE, PRESERVATIVE FREE 10 ML: 5 INJECTION INTRAVENOUS at 08:19

## 2024-12-19 RX ADMIN — AMLODIPINE BESYLATE 5 MG: 5 TABLET ORAL at 08:19

## 2024-12-19 RX ADMIN — SODIUM CHLORIDE: 9 INJECTION, SOLUTION INTRAVENOUS at 13:56

## 2024-12-19 RX ADMIN — QUETIAPINE FUMARATE 25 MG: 25 TABLET ORAL at 20:48

## 2024-12-19 RX ADMIN — SODIUM CHLORIDE, PRESERVATIVE FREE 10 ML: 5 INJECTION INTRAVENOUS at 20:49

## 2024-12-19 RX ADMIN — BUDESONIDE 250 MCG: 0.25 INHALANT RESPIRATORY (INHALATION) at 20:55

## 2024-12-19 ASSESSMENT — PAIN SCALES - GENERAL: PAINLEVEL_OUTOF10: 6

## 2024-12-19 ASSESSMENT — PAIN DESCRIPTION - LOCATION: LOCATION: FOOT

## 2024-12-19 ASSESSMENT — PAIN DESCRIPTION - ORIENTATION: ORIENTATION: LEFT

## 2024-12-19 NOTE — PLAN OF CARE
Problem: Physical Therapy - Adult  Goal: By Discharge: Performs mobility at highest level of function for planned discharge setting.  See evaluation for individualized goals.  Description: FUNCTIONAL STATUS PRIOR TO ADMISSION: Patient was modified independent using a rollator for functional mobility within the home. Patient utilized a manual wheelchair when out of the home with her  assisting. Had been receiving HH PT/OT/nursing. Per patient, she had been WBAT in boot on LLE.    HOME SUPPORT PRIOR TO ADMISSION: The patient lived with her  and required moderate assistance for mobility/ADL's on \"bad days\" but otherwise was independent.    Physical Therapy Goals  Initiated 12/19/2024  1.  Patient will move from supine to sit and sit to supine, scoot up and down, and roll side to side in bed with modified independence within 7 day(s).    2.  Patient will perform sit to stand with minimal assistance within 7 day(s).  3.  Patient will transfer from bed to chair and chair to bed with minimal assistance using the least restrictive device within 7 day(s).    Outcome: Progressing     PHYSICAL THERAPY EVALUATION    Patient: Marilee Oakes (74 y.o. female)  Date: 12/19/2024  Primary Diagnosis: Osteomyelitis [M86.9]       Precautions: Restrictions/Precautions: Fall Risk, Other (Comment) (presume NWB LLE (no clarification from ortho/MD))                      ASSESSMENT :   DEFICITS/IMPAIRMENTS:   The patient is limited by decreased functional mobility, strength, activity tolerance, balance, orthostatic hypotension. This is a 74 year old female who presented to OSH due to fever/chills and general malaise. Transferred to [facility] for possible osteomyelitis. CT pending, MRI pending (PPM). Awaiting orthopedic consult, assume NWB LLE until clarified per attending. Patient reports at baseline she wears a boot on LLE and was WBAT. Patient received in bed agreeable to treatment. Overall requiring min A for bed mobility.  and Heels elevated for pressure relief    COMMUNICATION/EDUCATION:   The patient's plan of care was discussed with: occupational therapist and registered nurse    Patient Education  Education Given To: Patient  Education Provided: Role of Therapy;Plan of Care;Home Exercise Program;Transfer Training;Precautions  Education Method: Demonstration;Verbal  Barriers to Learning: None  Education Outcome: Verbalized understanding;Continued education needed    Thank you for this referral.  Joelle Sanon PT, DPT  Minutes: 33      Physical Therapy Evaluation Charge Determination   History Examination Presentation Decision-Making   HIGH Complexity :3+ comorbidities / personal factors will impact the outcome/ POC  LOW Complexity : 1-2 Standardized tests and measures addressing body structure, function, activity limitation and / or participation in recreation  MEDIUM Complexity : Evolving with changing characteristics  AM-PAC  MEDIUM   Based on the above components, the patient evaluation is determined to be of the following complexity level: Low

## 2024-12-19 NOTE — CONSULTS
ORTHOPEDIC SURGERY CONSULT    Subjective:     Date of Consultation:  December 19, 2024    Marilee Oakes is a 74 y.o. female who is being seen for left lower leg swelling. She suffered a left ankle fracture in March 2024 and has been followed by [facility]. She initially had ORIF by Dr. Farnsworth at [facility]. Unfortunately this became infected and did not improve despite IV antibiotics and wound vac. She had a second surgery by Dr. Farnsworth for I&D and hardware removal in April 2024. She had a non-union of her fracture and was referred to Dr. Chan for further care. She had a third surgery in June 2024 for fusion of the ankle with retrograde TTC nail. Unfortunately this also became infected and she had a fourth surgery to remove the nail in October 2024. She saw Dr. Chan in November 2024 and there seemed to be improvement but she continued to need wound care. There was concern for ongoing infection and Dr. Chan recommended infectious disease evaluation and further imaging to rule out persistent infection. Mrs. Oakes presented to [facility] ER on 12/17/24. She was then transferred to [place] for admission. She is being treated for bacteremia and has a UTI. Past medical history is significant for diabetes, CKD, CHF, HTN, Afib, and asthma. She has a pacemaker. Her last imaging of the left ankle was a CT on 11/20/24. There appeared to be some bony healing and also some concern for persistent osteomyelitis. Orthopedic surgery has been consulted by Dr. Noble evaluation of left ankle infection    Patient Active Problem List    Diagnosis Date Noted    ESRD (end stage renal disease) on dialysis (HCC) 05/20/2024    Coronary artery disease due to lipid rich plaque     Closed fracture of multiple ribs of right side with routine healing 08/31/2021    Ingrown toenail of left foot 12/17/2019    Lower extremity edema 12/17/2019    Venous stasis dermatitis of both lower extremities 12/17/2019    Obesity (BMI    ondansetron (ZOFRAN-ODT) 4 MG disintegrating tablet Take 1 tablet by mouth 3 times daily as needed for Nausea or Vomiting 8/11/24  Yes Levy Dalton MD   aspirin 81 MG chewable tablet Take 1 tablet by mouth daily 7/8/24  Yes Bel Pereira APRN - CNP   acetaminophen (TYLENOL) 500 MG tablet Take 1 tablet by mouth every 6 hours as needed for Pain 7/8/24  Yes Bel Pereira APRN - CNP   balsum peru-castor oil (VENELEX) OINT ointment Apply 0.087 g topically 2 times daily 7/8/24  Yes Bel Pereira APRN - CNP   vitamin D 25 MCG (1000 UT) CAPS Take 1 capsule by mouth daily 7/8/24  Yes Bel Pereira APRN - CNP   Lidocaine, Anorectal, 5 % CREA Apply topically in the morning and at bedtime   Yes ProviderAngela MD   fluticasone-umeclidin-vilant (TRELEGY ELLIPTA) 100-62.5-25 MCG/ACT AEPB inhaler Inhale 1 puff into the lungs daily 9/19/24   Darien Potter MD   ferrous sulfate (IRON 325) 325 (65 Fe) MG tablet Take 1 tablet by mouth daily (with breakfast) 7/8/24   Bel Pereira APRN - CNP   B Complex-C-Folic Acid (RENAL MULTIVITAMIN FORMULA) TABS Take 1 tablet by mouth daily 7/8/24 9/10/24  Bel Pereira APRN - CNP     Current Facility-Administered Medications   Medication Dose Route Frequency    bumetanide (BUMEX) tablet 1 mg  1 mg Oral BID    miconazole (MICOTIN) 2 % powder   Topical BID    budesonide (PULMICORT) nebulizer suspension 250 mcg  0.25 mg Nebulization BID RT    And    arformoterol tartrate (BROVANA) nebulizer solution 15 mcg  15 mcg Nebulization BID RT    And    ipratropium (ATROVENT) 0.02 % nebulizer solution 0.5 mg  0.5 mg Nebulization BID RT    amLODIPine (NORVASC) tablet 5 mg  5 mg Oral Daily    carvedilol (COREG) tablet 25 mg  25 mg Oral BID with meals    insulin lispro (HUMALOG,ADMELOG) injection vial 0-8 Units  0-8 Units SubCUTAneous 4x Daily AC & HS    glucose chewable tablet 16 g  4 tablet Oral PRN    dextrose bolus 10% 125 mL  125 mL IntraVENous PRN

## 2024-12-19 NOTE — PROGRESS NOTES
Infectious Disease Progress     INFECTIOUS DISEASE Attending:     I agree with the above infectious disease daily progress note in its entirety as authored by and discussed in detail with the nurse practitioner.   I have reviewed pertinent laboratory studies, microbiology cultures, radiologic reports with review of the consultations and progress notes as appropriate.   I agree with today's subjective findings, physical examination, assessment and plan of care as described above and discussed extensively with the nurse practitioner.       Breathing improved.  Afebrile.  Much more comfortable.    Exam with patient in NAD, supple neck, WOB much improved.  LE with +2 pitting edema b/l.    Plan:    Continue Vancomycin.    Follow repeat blood cultures x2 sets.    TTE.    Bone scan ordered by orthopedics, will follow.    Suspect either residual LLE MRSA osteomyelitis or ICD infection.    A total time of 30 minutes was spent on today's encounter.  Greater than 50% of the time was spent on the following:  Preparing for visit and chart review.  Obtaining and/or reviewing separately obtained history  Performing a medically appropriate exam and/or evaluation  Counseling and educating a patient/family/caregiver as noted above  Placing relevant orders  Referring and communicating with other professionals (not separately reported)  Independently interpreting results (not separately reported) and communicating results to the patient/family/caregiver  Care coordination (not separately reported) as noted above  Documenting clinical information in the electronic health records (e.g. problem list, visit note) on the day of the encounter      Impression:   High Grade MRSA Bacteremia  OM of left foot  LLE hardware infection  Concerned with PPM infection  - afebrile, wbc 4.1     Blood cx (12/16) MRSA, (12/18) no growth so far    HILARY on CKD  CAD  A-fib, s/p ablation  CHF  Elevated troponin  - primary team following    Plan:     - continue  200 mL IVPB  1,000 mg IntraVENous Q24H Estuardo Johnson MD        ferrous sulfate (IRON 325) tablet 325 mg  325 mg Oral Daily with breakfast Estuardo Johnson MD   325 mg at 12/19/24 0819    aspirin chewable tablet 81 mg  81 mg Oral Daily Estuardo Johnson MD   81 mg at 12/19/24 0819    pantoprazole (PROTONIX) tablet 40 mg  40 mg Oral QAM AC Estuardo Johnson MD   40 mg at 12/19/24 0716    pregabalin (LYRICA) capsule 75 mg  75 mg Oral Daily Estuardo Johnson MD   75 mg at 12/19/24 0819    QUEtiapine (SEROQUEL) tablet 25 mg  25 mg Oral QHS Estuardo Johnson MD   25 mg at 12/18/24 2238    sodium chloride flush 0.9 % injection 5-40 mL  5-40 mL IntraVENous 2 times per day Estuardo Johnson MD   10 mL at 12/19/24 0819    sodium chloride flush 0.9 % injection 5-40 mL  5-40 mL IntraVENous PRN Estuardo Johnson MD        0.9 % sodium chloride infusion   IntraVENous PRN Estuardo Johnson MD 25 mL/hr at 12/18/24 1717 New Bag at 12/18/24 1717    potassium chloride (KLOR-CON) extended release tablet 40 mEq  40 mEq Oral PRN Estuardo Johnson MD   40 mEq at 12/19/24 0716    Or    potassium bicarb-citric acid (EFFER-K) effervescent tablet 40 mEq  40 mEq Oral PRN Estuardo Johnson MD        Or    potassium chloride 10 mEq/100 mL IVPB (Peripheral Line)  10 mEq IntraVENous PRN Estuardo Johnson MD        magnesium sulfate 2000 mg in 50 mL IVPB premix  2,000 mg IntraVENous PRN Estuardo Johnson MD        enoxaparin Sodium (LOVENOX) injection 30 mg  30 mg SubCUTAneous Q12H Estuardo Johnson MD   30 mg at 12/19/24 0819    ondansetron (ZOFRAN-ODT) disintegrating tablet 4 mg  4 mg Oral Q8H PRN Estuardo Johnson MD        Or    ondansetron (ZOFRAN) injection 4 mg  4 mg IntraVENous Q6H PRN Estuardo Johnson MD        polyethylene glycol (GLYCOLAX) packet 17 g  17 g Oral Daily PRN Estuardo Johnson MD        acetaminophen (TYLENOL) tablet 650 mg  650 mg Oral Q6H PRN Estuardo Johnson MD        Or    acetaminophen (TYLENOL) suppository 650 mg  650 mg Rectal Q6H PRN Estuardo Johnson MD        Vancomycin - Pharmacy to Dose   Other RX  problem list, visit note) on the day of the encounter    LIZZY Hankins NP

## 2024-12-19 NOTE — PROGRESS NOTES
4 Eyes Skin Assessment     NAME:  Marilee Oakes  YOB: 1950  MEDICAL RECORD NUMBER:  [id number]    The patient is being assessed for  Transfer to New Unit    I agree that at least one RN has performed a thorough Head to Toe Skin Assessment on the patient. ALL assessment sites listed below have been assessed.      Areas assessed by both nurses:    Head, Face, Ears, Shoulders, Back, Chest, Arms, Elbows, Hands, Sacrum. Buttock, Coccyx, Ischium, Legs. Feet and Heels, and Under Medical Devices         Does the Patient have a Wound? No noted wound(s)       Oliver Prevention initiated by RN: Yes  Wound Care Orders initiated by RN: No    Pressure Injury (Stage 3,4, Unstageable, DTI, NWPT, and Complex wounds) if present, place Wound referral order by RN under : No    New Ostomies, if present place, Ostomy referral order under : No     Nurse 1 eSignature: Electronically signed by Jennifer Bella RN on 12/18/24 at 7:25 PM EST    **SHARE this note so that the co-signing nurse can place an eSignature**    Nurse 2 eSignature: Electronically signed by Violette Henao RN on 12/18/24 at 7:44 PM EST

## 2024-12-19 NOTE — PLAN OF CARE
Problem: Occupational Therapy - Adult  Goal: By Discharge: Performs self-care activities at highest level of function for planned discharge setting.  See evaluation for individualized goals.  Description: FUNCTIONAL STATUS PRIOR TO ADMISSION:  Patient was Mod I-Min A for BADLs and w/c transfers (via RW vs slide board), lives with her  who assists PRN, working with HH therapies and RN. Hx of L foot wound with poor healing, wears a CAM boot with WBAT (not currently in hospital).     Occupational Therapy Goals:  Initiated 12/19/2024  1.  Patient will perform seated bathing with Minimal Assist & AE PRN within 7 day(s).  2.  Patient will perform lower body dressing with Minimal Assist & AE PRN within 7 day(s).  3.  Patient will perform toilet transfers to/from [facility] with Moderate Assist x2 & DME PRN within 7 day(s).  4.  Patient will perform all aspects of toileting with Moderate Assist & AE PRN within 7 day(s).  5.  Patient will participate in upper extremity therapeutic exercise/activities with Supervision for 5 minutes with rest breaks PRN within 7 day(s).    6.  Patient will utilize energy conservation techniques during functional activities with verbal cues within 7 day(s).    Outcome: Progressing     OCCUPATIONAL THERAPY EVALUATION    Patient: Marilee Oakes (74 y.o. female)  Date: 12/19/2024  Primary Diagnosis: Osteomyelitis [M86.9]         Precautions: Fall Risk, Other (Comment) (presume NWB LLE (no clarification from ortho/MD))                  ASSESSMENT :  The patient is limited by decreased functional mobility, independence in ADLs, strength, body mechanics, activity tolerance, endurance, safety awareness, coordination, balance, and distal lower body access/functional reach s/p admission for OM.    She presents below her baseline (see above), engaging with all activity, however only able to tolerate EOB activity and ADLs with Min-Max A. Frequent rest breaks required d/t tachycardia (130s) and  Adaptive Strategies;Transfer Training;Fall Prevention Strategies;Mobility Training  Education Method: Demonstration;Verbal;Teach Back  Barriers to Learning: None  Education Outcome: Verbalized understanding;Demonstrated understanding;Continued education needed    Thank you for this referral.  BRENNEN Gan, OTR/L  Minutes: 32    Occupational Therapy Evaluation Charge Determination   History Examination Decision-Making   LOW Complexity : Brief history review  MEDIUM Complexity: 3-5 Performance deficits relating to physical, cognitive, or psychosocial skills that result in activity limitations and/or participation restrictions MEDIUM Complexity: Patient may present with comorbidities that affect occupational performance. Minimal to moderate modifications of tasks or assist (eg. physical or verbal) with assist is necessary to enable pt to complete eval   Based on the above components, the patient evaluation is determined to be of the following complexity level: Low

## 2024-12-19 NOTE — PROGRESS NOTES
0730: Bedside shift change report given to AGNES Rodriguze (oncoming nurse) by AGNES Larsen (offgoing nurse). Report included the following information Nurse Handoff Report, MAR, and Recent Results.     1245: Report called to 5S RN on patient Marilee Oakes transferring to room 571.    1345: Patient transferred to room 571 with nurse, monitor, and all belongings.

## 2024-12-19 NOTE — PLAN OF CARE
Problem: Chronic Conditions and Co-morbidities  Goal: Patient's chronic conditions and co-morbidity symptoms are monitored and maintained or improved  12/18/2024 2006 by Suzie Sanon RN  Outcome: Progressing  Flowsheets (Taken 12/18/2024 2006)  Care Plan - Patient's Chronic Conditions and Co-Morbidity Symptoms are Monitored and Maintained or Improved: Monitor and assess patient's chronic conditions and comorbid symptoms for stability, deterioration, or improvement  12/18/2024 0935 by Daniela Burrell RN  Outcome: Progressing     Problem: Discharge Planning  Goal: Discharge to home or other facility with appropriate resources  12/18/2024 2006 by Suzie Sanon RN  Outcome: Progressing  Flowsheets (Taken 12/18/2024 2006)  Discharge to home or other facility with appropriate resources: Identify barriers to discharge with patient and caregiver  12/18/2024 0935 by Daniela Burrell RN  Outcome: Progressing     Problem: Safety - Adult  Goal: Free from fall injury  12/18/2024 2006 by Suzie Sanon RN  Outcome: Progressing  Flowsheets (Taken 12/18/2024 2006)  Free From Fall Injury: Instruct family/caregiver on patient safety  12/18/2024 0935 by Daniela Burrell RN  Outcome: Progressing

## 2024-12-19 NOTE — CONSULTS
Pulmonary, Critical Care, and Sleep Medicine~Consult Note    Name: Marilee Oakes MRN: [id number]   : 1950 Hospital: [facility]   Date: 2024 9:59 AM Admission: 2024     Impression Plan   MRSA bacteremia, source?  Left foot wound?  Pacemaker?  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  It could be used when she feels her throat feeling tight and short of breath and hoarseness.  Will need evaluation with speech therapist following discharge  On bronchodilators  Antibiotics per ID  No indication for steroids at this time  DVT prophylaxis  O2 titration above 90%  PUD prophylaxis  Will have her follow back up with Dr. Moreira in 1 to 2 weeks following discharge     Daily Progression:    Consult Note request by hospitalist service.     Patient presented for admission on 2024 secondary to [facility] and was laterally transferred to [facility].  She reports chills body aches and cough over the last 3 days prior to admission.  proBNP yesterday was 27,000.  Echocardiogram pending but she did have an echo in 2023 with an EF of 55 to 60% with aortic valve stenosis and mitral regurgitation.  No shunt was seen on that echo.  Patient presented for left foot wound and bacteremia that showed MRSA.  Urine cultures showed E. coli extended spectrum beta-lactamase .  ABG done today did not show any CO2 retention.  She is a never smoker.  She does carry diagnosis of asthma and is followed by outpatient pulmonologist Dr. Moreira.  Is not on outpatient bronchodilators because she is doing very well.  Of note she has had peripheral eosinophilia as high as 600 cells per microliter in September.  Today she states that prior to admission she had hoarseness and throat tightness.  She states  MD Maame at [facility] MAIN OR    APPENDECTOMY      BREAST BIOPSY Left     about 4-5 years ago from , neg    CARDIAC SURGERY      AICD    CARDIAC SURGERY      WATCHMAN     SECTION      CHOLECYSTECTOMY  11/15/2018    COLONOSCOPY N/A 3/14/2018    COLONOSCOPY performed by Pepito Quintero MD at [facility] ENDOSCOPY    FRACTURE SURGERY      HYSTERECTOMY (CERVIX STATUS UNKNOWN)      HYSTERECTOMY, TOTAL ABDOMINAL (CERVIX REMOVED)      IR KYPHOPLASTY LUMBAR 1 VERTEBRAL BODY  2020    IR KYPHOPLASTY LUMBAR FIRST LEVEL 2020 [facility] ANGIO IR    IR KYPHOPLASTY LUMBAR 1 VERTEBRAL BODY  2020    IR KYPHOPLASTY THORACIC 1 VERTEBRAL BODY  2021    IR KYPHOPLASTY THORACIC FIRST LEVEL 2021 [facility] IR    IR KYPHOPLASTY THORACIC 1 VERTEBRAL BODY  2021    IR NONTUNNELED VASCULAR CATHETER > 5 YEARS  2024    IR NONTUNNELED VASCULAR CATHETER 2024 Jeremy Oakes Jr., APRN - NP [facility] RAD ANGIO IR    IR TUNNELED CVC PLACE WO SQ PORT/PUMP > 5 YEARS  2024    IR TUNNELED CATHETER PLACEMENT GREATER THAN 5 YEARS 2024 Claudette Cochran, APRN - NP [facility] RAD ANGIO IR    LEG SURGERY Left 2024    ANKLE INCISION AND DRAINAGE WITH HARDWARE REMOVAL performed by Edgardo Farnsworth,  at [facility] MAIN OR    FRANCISCA STEREO BREAST BX W LOC DEVICE 1ST LESION RIGHT Right 05/10/2022    FRANCISCA STEROTACTIC LOC BREAST BIOPSY RIGHT 5/10/2022 [facility] RAD MAMMO    PACEMAKER      KS UNLISTED PROCEDURE CARDIAC SURGERY      stent      Prior to Admission medications    Medication Sig Start Date End Date Taking? Authorizing Provider   amLODIPine (NORVASC) 5 MG tablet Take 1 tablet by mouth daily   Yes Provider, MD Angela   benzonatate (TESSALON) 100 MG capsule Take 1 capsule by mouth 3 times daily as needed for Cough   Yes Provider, MD Angela   nystatin (MYCOSTATIN) 383891 UNIT/GM cream Apply topically 2 times daily Apply topically 2 times daily.   Yes Provider, MD Angela   oxyBUTYnin (DITROPAN) 5 MG tablet Take

## 2024-12-19 NOTE — PROGRESS NOTES
1520: Report received from AGNES Suarez on patient Marilee Oakes transferring to room 466.     1552: Patient transferred to room 466, vitals taken, assessment completed. Dual skin assessment completed with AGNES Garvin. Wound Care RN saw patient earlier in day, see note.    1725: Labs drawn, sent to lab.    1820: Respiratory called for ABGs.    1830: Critical troponin of 377. Notified NIKIK Cano. No new orders received.     1930: Bedside shift change report given to AGNES Larsen (oncoming nurse) by AGNES Rodriguez (offgoing nurse). Report included the following information Nurse Handoff Report, MAR, and Recent Results.     Shift Summary: Patient alert and oriented x 4. V-paced on tele, HR 70-80s. -150s. Slight fever of 100.3, patient declined Tylenol. Oxygen <90% on 3L via NC. Patient denies pain, chest pain, nausea, or dizziness. Patient reporting shortness of breath with exertion. Patient eating, drinking, and voiding adequately via purewick. Patient with two loose bowel movements. Potassium repleted per order, see MAR. Repeat labs drawn, see results.  at bedside throughout evening.

## 2024-12-19 NOTE — PROGRESS NOTES
Hospitalist Progress Note  Estuardo Johnson MD  Answering service: [phone]        Date of Service:  2024  NAME:  Marilee Oakes  :  1950  MRN:  [id number]      Admission Summary:     Patient admitted with left foot wound.    Interval history / Subjective:     Patient denies any significant pain in the left leg.     Assessment & Plan:     Left foot wound  -Chronic left foot wound, x-ray shows chronic appearing changes in the distal tibia and fibula as well as ankle, osteomyelitis is difficult to exclude, noted elevated CRP  -Consult placed to orthopedic  -Initially on cefepime and vancomycin, cefepime was now discontinued  -Continue on vancomycin, ID following    Bacteremia  -Initial blood cultures  growing MRSA 3 of 4 bottles, repeat blood cultures  so far negative  -Continue with vancomycin, ID following     Urinary tract infection  -Urinalysis shows moderate blood, large leukocyte esterase, pyuria and bacteriuria  -Urine culture growing ESBL E. Coli  -ID managing    Elevated troponin  -Likely due to infectious process  -ACS ruled out by cardiology    Hypertension  -Continue amlodipine and Coreg  -Monitor blood pressure     Elevated alk phos  -Likely source from the bones, noted cortical destruction on the left foot x-ray  -Continue monitor     Asthma  -Stable without exacerbation  -Continue home bronchodilators  -Breathing treatment as needed     CHF  -Patient with chronic CHF, last echo with normal EF  -Patient was short of breath yesterday and CTA of the chest shows mild pulmonary edema  -Increase Bumex dose to 1 mg twice daily, monitor respiratory status     CKD stage III  -Creatinine stable at baseline  -Continue monitor     History of atrial fibrillation  -S/p AV ruben ablation and pacemaker placement  -S/p Watchman placement, no anticoagulation needed     Code status: Full  Prophylaxis:     sodium chloride flush 0.9 % injection 5-40 mL  5-40 mL IntraVENous 2 times per day    sodium chloride flush 0.9 % injection 5-40 mL  5-40 mL IntraVENous PRN    0.9 % sodium chloride infusion   IntraVENous PRN    potassium chloride (KLOR-CON) extended release tablet 40 mEq  40 mEq Oral PRN    Or    potassium bicarb-citric acid (EFFER-K) effervescent tablet 40 mEq  40 mEq Oral PRN    Or    potassium chloride 10 mEq/100 mL IVPB (Peripheral Line)  10 mEq IntraVENous PRN    magnesium sulfate 2000 mg in 50 mL IVPB premix  2,000 mg IntraVENous PRN    enoxaparin Sodium (LOVENOX) injection 30 mg  30 mg SubCUTAneous Q12H    ondansetron (ZOFRAN-ODT) disintegrating tablet 4 mg  4 mg Oral Q8H PRN    Or    ondansetron (ZOFRAN) injection 4 mg  4 mg IntraVENous Q6H PRN    polyethylene glycol (GLYCOLAX) packet 17 g  17 g Oral Daily PRN    acetaminophen (TYLENOL) tablet 650 mg  650 mg Oral Q6H PRN    Or    acetaminophen (TYLENOL) suppository 650 mg  650 mg Rectal Q6H PRN    Vancomycin - Pharmacy to Dose   Other RX Placeholder     ______________________________________________________________________  EXPECTED LENGTH OF STAY: 6  ACTUAL LENGTH OF STAY:          2                 Estuardo Johnson MD

## 2024-12-19 NOTE — CONSULTS
Infectious Disease Consult Note    Assessment / Plan:       75 y/o female with recurrent MRSA bacteremia from LLE hardware infection and osteomyelitis and very concerning for involvement of ppm.    Continue Vancomycin.    Stopped Cefepime.    Follow repeat blood cultures x2 sets.    TTE.    CT of LLE, given what appears to be MRI contraindicated due to ppm.    No treatment needed for ESBL E coli but does need contact isolation.    Appreciate podiatry input from Dr. Noble.         Reason for Consult: MRSA Bacteremia  Date of Consultation: December 18, 2024  Date of Admission: 12/17/2024  Referring Physician: Dr. Pham    HPI:    75 y/o female with CHF, COPD, A-fib s/p ppm and Watchman, CAD, DM2 seen previously for ORIF 3/2024 ORIF LLE with ankle fracture complicated by 4/2024 with drainage and MRSA bacteremia and non-union of fracture s/p I/D and hardware removal 4/2024 and prolonged IV antibiotic course.  Then on 6/25/2024 underwent LLE tibiotalar intramedullary nail fusion.  This was also unfortunately complicated by MRSA infection s/p hardware removal and application of bone cement due to osteomyelitis from hardware infection and treated with prolonged Doxycycline course as it appears that patient declined IV antibiotic therapy.  Course completed with PO Doxycycline with reportedly good adherence on 12/3/24.  Multiple prior JUAN MANUEL without evidence of PPM lead vegetation nor endocarditis.    Admitted to [facility] for 2 days myalgias and chills, malaise, fever, vomiting.  Workup with MRSA recurrent bacteremia.  Started on Vanco/Cefepime.  X-ray without osteomyelitis on LLE.  C/o SOB.    Past Medical History:  Past Medical History:   Diagnosis Date    Acute and chronic respiratory failure with hypoxia     Age-related osteoporosis without current pathological fracture     Age-related osteoporosis without current pathological fracture     Anemia in chronic kidney disease     Anxiety and depression 01/22/2018    Arthritis   Value Ref Range    Body Surface Area 2.2 m2       Microbiology Data:       Blood: MRSA in 3/4 bottles from 12/16/24      Urine: ESBL E coli    Imaging: CT A/P with evidence of splenomegaly and prior kyphoplasies T12 and L1    Thank you Dr. Pham for the opportunity to participate in the care of this patient. Please contact with questions or concerns.     A total time of 80 minutes was spent on today's encounter.  Greater than 50% of the time was spent on the following:  Preparing for visit and chart review.  Obtaining and/or reviewing separately obtained history  Performing a medically appropriate exam and/or evaluation  Counseling and educating a patient/family/caregiver as noted above  Placing relevant orders  Referring and communicating with other professionals (not separately reported)  Independently interpreting results (not separately reported) and communicating results to the patient/family/caregiver  Care coordination (not separately reported) as noted above  Documenting clinical information in the electronic health records (e.g. problem list, visit note) on the day of the encounter

## 2024-12-20 ENCOUNTER — APPOINTMENT (OUTPATIENT)
Facility: HOSPITAL | Age: 74
DRG: 638 | End: 2024-12-20
Attending: INTERNAL MEDICINE
Payer: MEDICARE

## 2024-12-20 LAB
ALBUMIN SERPL-MCNC: 2.9 G/DL (ref 3.5–5)
ALBUMIN/GLOB SERPL: 0.9 (ref 1.1–2.2)
ALP SERPL-CCNC: 205 U/L (ref 45–117)
ALT SERPL-CCNC: 19 U/L (ref 12–78)
ANION GAP SERPL CALC-SCNC: 9 MMOL/L (ref 2–12)
AST SERPL-CCNC: 19 U/L (ref 15–37)
BASOPHILS # BLD: 0 K/UL (ref 0–0.1)
BASOPHILS NFR BLD: 1 % (ref 0–1)
BILIRUB SERPL-MCNC: 0.6 MG/DL (ref 0.2–1)
BUN SERPL-MCNC: 28 MG/DL (ref 6–20)
BUN/CREAT SERPL: 18 (ref 12–20)
CALCIUM SERPL-MCNC: 9 MG/DL (ref 8.5–10.1)
CHLORIDE SERPL-SCNC: 110 MMOL/L (ref 97–108)
CO2 SERPL-SCNC: 24 MMOL/L (ref 21–32)
CREAT SERPL-MCNC: 1.57 MG/DL (ref 0.55–1.02)
DIFFERENTIAL METHOD BLD: ABNORMAL
ECHO AO ROOT DIAM: 3.4 CM
ECHO AO ROOT INDEX: 1.6 CM/M2
ECHO AV AREA PEAK VELOCITY: 0.8 CM2
ECHO AV AREA PEAK VELOCITY: 76 CM2
ECHO AV AREA VTI: 0.7 CM2
ECHO AV AREA/BSA VTI: 0.3 CM2/M2
ECHO AV MEAN GRADIENT: 38 MMHG
ECHO AV MEAN VELOCITY: 3 M/S
ECHO AV PEAK GRADIENT: 60 MMHG
ECHO AV PEAK VELOCITY: 0 M/S
ECHO AV PEAK VELOCITY: 3.9 M/S
ECHO AV VTI: 81.9 CM
ECHO BSA: 2.19 M2
ECHO LA DIAMETER INDEX: 2.22 CM/M2
ECHO LA DIAMETER: 4.7 CM
ECHO LA TO AORTIC ROOT RATIO: 1.38
ECHO LV E' LATERAL VELOCITY: 4.88 CM/S
ECHO LV E' SEPTAL VELOCITY: 4.56 CM/S
ECHO LV EF PHYSICIAN: 65 %
ECHO LV FRACTIONAL SHORTENING: 27 % (ref 28–44)
ECHO LV INTERNAL DIMENSION DIASTOLE INDEX: 2.64 CM/M2
ECHO LV INTERNAL DIMENSION DIASTOLIC: 5.6 CM (ref 3.9–5.3)
ECHO LV INTERNAL DIMENSION SYSTOLIC INDEX: 1.93 CM/M2
ECHO LV INTERNAL DIMENSION SYSTOLIC: 4.1 CM
ECHO LV IVSD: 0.8 CM (ref 0.6–0.9)
ECHO LV MASS 2D: 191.6 G (ref 67–162)
ECHO LV MASS INDEX 2D: 90.4 G/M2 (ref 43–95)
ECHO LV POSTERIOR WALL DIASTOLIC: 1 CM (ref 0.6–0.9)
ECHO LV RELATIVE WALL THICKNESS RATIO: 0.36
ECHO LVOT AREA: 3.1 CM2
ECHO LVOT AV VTI INDEX: 0.24
ECHO LVOT DIAM: 2 CM
ECHO LVOT MEAN GRADIENT: 2 MMHG
ECHO LVOT PEAK GRADIENT: 5 MMHG
ECHO LVOT PEAK VELOCITY: 1.1 M/S
ECHO LVOT STROKE VOLUME INDEX: 28.6 ML/M2
ECHO LVOT SV: 60.6 ML
ECHO LVOT VTI: 19.3 CM
ECHO MV REGURGITANT ALIASING (NYQUIST) VELOCITY: 31 CM/S
ECHO MV REGURGITANT RADIUS PISA: 0.68 CM
ECHO PULMONARY ARTERY SYSTOLIC PRESSURE (PASP): 55 MMHG
ECHO PV MAX VELOCITY: 0.7 M/S
ECHO PV PEAK GRADIENT: 2 MMHG
ECHO RV FREE WALL PEAK S': 9.9 CM/S
ECHO RV TAPSE: 1.6 CM (ref 1.7–?)
ECHO TV REGURGITANT MAX VELOCITY: 3.5 M/S
ECHO TV REGURGITANT PEAK GRADIENT: 49 MMHG
EOSINOPHIL # BLD: 0.3 K/UL (ref 0–0.4)
EOSINOPHIL NFR BLD: 8 % (ref 0–7)
ERYTHROCYTE [DISTWIDTH] IN BLOOD BY AUTOMATED COUNT: 15.7 % (ref 11.5–14.5)
GLOBULIN SER CALC-MCNC: 3.3 G/DL (ref 2–4)
GLUCOSE BLD STRIP.AUTO-MCNC: 141 MG/DL (ref 65–117)
GLUCOSE BLD STRIP.AUTO-MCNC: 144 MG/DL (ref 65–117)
GLUCOSE BLD STRIP.AUTO-MCNC: 156 MG/DL (ref 65–117)
GLUCOSE BLD STRIP.AUTO-MCNC: 202 MG/DL (ref 65–117)
GLUCOSE SERPL-MCNC: 133 MG/DL (ref 65–100)
HCT VFR BLD AUTO: 33.5 % (ref 35–47)
HGB BLD-MCNC: 10 G/DL (ref 11.5–16)
IMM GRANULOCYTES # BLD AUTO: 0.1 K/UL (ref 0–0.04)
IMM GRANULOCYTES NFR BLD AUTO: 1 % (ref 0–0.5)
LYMPHOCYTES # BLD: 0.9 K/UL (ref 0.8–3.5)
LYMPHOCYTES NFR BLD: 19 % (ref 12–49)
MCH RBC QN AUTO: 27 PG (ref 26–34)
MCHC RBC AUTO-ENTMCNC: 29.9 G/DL (ref 30–36.5)
MCV RBC AUTO: 90.5 FL (ref 80–99)
MONOCYTES # BLD: 0.4 K/UL (ref 0–1)
MONOCYTES NFR BLD: 8 % (ref 5–13)
NEUTS SEG # BLD: 2.9 K/UL (ref 1.8–8)
NEUTS SEG NFR BLD: 63 % (ref 32–75)
NRBC # BLD: 0 K/UL (ref 0–0.01)
NRBC BLD-RTO: 0 PER 100 WBC
PLATELET # BLD AUTO: 192 K/UL (ref 150–400)
PMV BLD AUTO: 11.6 FL (ref 8.9–12.9)
POTASSIUM SERPL-SCNC: 3.6 MMOL/L (ref 3.5–5.1)
PROT SERPL-MCNC: 6.2 G/DL (ref 6.4–8.2)
RBC # BLD AUTO: 3.7 M/UL (ref 3.8–5.2)
SERVICE CMNT-IMP: ABNORMAL
SODIUM SERPL-SCNC: 143 MMOL/L (ref 136–145)
WBC # BLD AUTO: 4.5 K/UL (ref 3.6–11)

## 2024-12-20 PROCEDURE — 6370000000 HC RX 637 (ALT 250 FOR IP)

## 2024-12-20 PROCEDURE — 6360000002 HC RX W HCPCS: Performed by: FAMILY MEDICINE

## 2024-12-20 PROCEDURE — 93306 TTE W/DOPPLER COMPLETE: CPT | Performed by: INTERNAL MEDICINE

## 2024-12-20 PROCEDURE — 78315 BONE IMAGING 3 PHASE: CPT

## 2024-12-20 PROCEDURE — 6370000000 HC RX 637 (ALT 250 FOR IP): Performed by: FAMILY MEDICINE

## 2024-12-20 PROCEDURE — 2500000003 HC RX 250 WO HCPCS: Performed by: FAMILY MEDICINE

## 2024-12-20 PROCEDURE — 94640 AIRWAY INHALATION TREATMENT: CPT

## 2024-12-20 PROCEDURE — 99233 SBSQ HOSP IP/OBS HIGH 50: CPT | Performed by: INTERNAL MEDICINE

## 2024-12-20 PROCEDURE — 80053 COMPREHEN METABOLIC PANEL: CPT

## 2024-12-20 PROCEDURE — 82962 GLUCOSE BLOOD TEST: CPT

## 2024-12-20 PROCEDURE — 97535 SELF CARE MNGMENT TRAINING: CPT

## 2024-12-20 PROCEDURE — 97530 THERAPEUTIC ACTIVITIES: CPT

## 2024-12-20 PROCEDURE — 97116 GAIT TRAINING THERAPY: CPT

## 2024-12-20 PROCEDURE — A9503 TC99M MEDRONATE: HCPCS | Performed by: PHYSICIAN ASSISTANT

## 2024-12-20 PROCEDURE — 3430000000 HC RX DIAGNOSTIC RADIOPHARMACEUTICAL: Performed by: PHYSICIAN ASSISTANT

## 2024-12-20 PROCEDURE — 85025 COMPLETE CBC W/AUTO DIFF WBC: CPT

## 2024-12-20 PROCEDURE — 93306 TTE W/DOPPLER COMPLETE: CPT

## 2024-12-20 PROCEDURE — APPSS15 APP SPLIT SHARED TIME 0-15 MINUTES: Performed by: NURSE PRACTITIONER

## 2024-12-20 PROCEDURE — 1100000000 HC RM PRIVATE

## 2024-12-20 RX ORDER — TRAMADOL HYDROCHLORIDE 50 MG/1
50 TABLET ORAL EVERY 6 HOURS PRN
Status: COMPLETED | OUTPATIENT
Start: 2024-12-20 | End: 2024-12-21

## 2024-12-20 RX ORDER — TC 99M MEDRONATE 20 MG/10ML
21.6 INJECTION, POWDER, LYOPHILIZED, FOR SOLUTION INTRAVENOUS
Status: COMPLETED | OUTPATIENT
Start: 2024-12-20 | End: 2024-12-20

## 2024-12-20 RX ADMIN — QUETIAPINE FUMARATE 25 MG: 25 TABLET ORAL at 22:38

## 2024-12-20 RX ADMIN — MICONAZOLE NITRATE: 2 POWDER TOPICAL at 22:38

## 2024-12-20 RX ADMIN — MICONAZOLE NITRATE: 2 POWDER TOPICAL at 09:57

## 2024-12-20 RX ADMIN — SODIUM CHLORIDE, PRESERVATIVE FREE 10 ML: 5 INJECTION INTRAVENOUS at 09:59

## 2024-12-20 RX ADMIN — TRAMADOL HYDROCHLORIDE 50 MG: 50 TABLET, COATED ORAL at 21:29

## 2024-12-20 RX ADMIN — ASPIRIN 81 MG CHEWABLE TABLET 81 MG: 81 TABLET CHEWABLE at 09:55

## 2024-12-20 RX ADMIN — PANTOPRAZOLE SODIUM 40 MG: 40 TABLET, DELAYED RELEASE ORAL at 06:33

## 2024-12-20 RX ADMIN — CARVEDILOL 25 MG: 12.5 TABLET, FILM COATED ORAL at 16:29

## 2024-12-20 RX ADMIN — ENOXAPARIN SODIUM 30 MG: 100 INJECTION SUBCUTANEOUS at 09:56

## 2024-12-20 RX ADMIN — BUMETANIDE 1 MG: 1 TABLET ORAL at 16:29

## 2024-12-20 RX ADMIN — SODIUM CHLORIDE, PRESERVATIVE FREE 10 ML: 5 INJECTION INTRAVENOUS at 21:31

## 2024-12-20 RX ADMIN — BUDESONIDE 250 MCG: 0.25 INHALANT RESPIRATORY (INHALATION) at 19:58

## 2024-12-20 RX ADMIN — INSULIN LISPRO 2 UNITS: 100 INJECTION, SOLUTION INTRAVENOUS; SUBCUTANEOUS at 21:31

## 2024-12-20 RX ADMIN — ACETAMINOPHEN 650 MG: 325 TABLET ORAL at 09:55

## 2024-12-20 RX ADMIN — FERROUS SULFATE TAB 325 MG (65 MG ELEMENTAL FE) 325 MG: 325 (65 FE) TAB at 09:55

## 2024-12-20 RX ADMIN — ARFORMOTEROL TARTRATE 15 MCG: 15 SOLUTION RESPIRATORY (INHALATION) at 19:59

## 2024-12-20 RX ADMIN — ENOXAPARIN SODIUM 30 MG: 100 INJECTION SUBCUTANEOUS at 21:29

## 2024-12-20 RX ADMIN — PREGABALIN 75 MG: 75 CAPSULE ORAL at 09:55

## 2024-12-20 RX ADMIN — ACETAMINOPHEN 650 MG: 325 TABLET ORAL at 16:29

## 2024-12-20 RX ADMIN — VANCOMYCIN 1000 MG: 1 INJECTION, SOLUTION INTRAVENOUS at 12:48

## 2024-12-20 RX ADMIN — IPRATROPIUM BROMIDE 0.5 MG: 0.5 SOLUTION RESPIRATORY (INHALATION) at 19:59

## 2024-12-20 RX ADMIN — TC 99M MEDRONATE 21.6 MILLICURIE: 20 INJECTION, POWDER, LYOPHILIZED, FOR SOLUTION INTRAVENOUS at 07:28

## 2024-12-20 ASSESSMENT — PAIN DESCRIPTION - LOCATION: LOCATION: FOOT

## 2024-12-20 ASSESSMENT — PAIN SCALES - GENERAL
PAINLEVEL_OUTOF10: 8
PAINLEVEL_OUTOF10: 3

## 2024-12-20 ASSESSMENT — PAIN DESCRIPTION - ORIENTATION: ORIENTATION: RIGHT;LEFT

## 2024-12-20 ASSESSMENT — PAIN DESCRIPTION - DESCRIPTORS: DESCRIPTORS: ACHING

## 2024-12-20 NOTE — PROGRESS NOTES
Physician Progress Note      PATIENT:               NEDRA WEBER  [facility] #:                  [id number]  :                       1950  ADMIT DATE:       2024 10:41 AM  DISCH DATE:  RESPONDING  PROVIDER #:        Estuardo Johnson MD          QUERY TEXT:    Patient admitted with osteomyelitis of left lower extremity. Noted   documentation of sepsis in Cardiology CN on , Temp-98.1,99.1,   WBC-9.0,4.1K/UL. In order to support the diagnosis of sepsis, please include   additional clinical indicators in your documentation.  Or please document if   the diagnosis of sepsis has been ruled out after further study    The medical record reflects the following:  Risk Factors: Osteomyelitis, UTI, Age 74yrs  Clinical Indicators: Cardiology CN on -concerns for sepsis she has been   admitted and started on antibiotics. Non-MI troponin elevation, likely   secondary to sepsis.  Sepsis, Bacteremia, Source appears to be osteomyelitis  Wound care note on -Admitted for sepsis  Temp-99.5, 99.4, 100.3, 98.1, 99.1 from  to   WBC-9.0, 4.1K/UL  CRP-10.10 mg/dl  HR-110, 92  RR-33, 30, 28, 22  urine culture -Escherichia coli  Treatment: vancomycin (VANCOCIN) 1,000 mg in sodium chloride 0.9 % 250 mL IV,   IVF, serial labs    Thank you,  JASBIR Mckeon, CDS  Options provided:  -- Sepsis present as evidenced by, Please document evidence.  -- Sepsis was ruled out after study  -- Other - I will add my own diagnosis  -- Disagree - Not applicable / Not valid  -- Disagree - Clinically unable to determine / Unknown  -- Refer to Clinical Documentation Reviewer    PROVIDER RESPONSE TEXT:    Sepsis was ruled out after study.    Query created by: Cheryl Winter on 2024 7:34 AM      Electronically signed by:  Estuardo Johnson MD 2024 2:22 PM

## 2024-12-20 NOTE — PLAN OF CARE
Problem: Occupational Therapy - Adult  Goal: By Discharge: Performs self-care activities at highest level of function for planned discharge setting.  See evaluation for individualized goals.  Description: FUNCTIONAL STATUS PRIOR TO ADMISSION:  Patient was Mod I-Min A for BADLs and w/c transfers (via RW vs slide board), lives with her  who assists PRN, working with HH therapies and RN. Hx of L foot wound with poor healing, wears a CAM boot with WBAT (not currently in hospital).     Occupational Therapy Goals:  Initiated 12/19/2024  1.  Patient will perform seated bathing with Minimal Assist & AE PRN within 7 day(s).  2.  Patient will perform lower body dressing with Minimal Assist & AE PRN within 7 day(s).  3.  Patient will perform toilet transfers to/from [facility] with Moderate Assist x2 & DME PRN within 7 day(s).  4.  Patient will perform all aspects of toileting with Moderate Assist & AE PRN within 7 day(s).  5.  Patient will participate in upper extremity therapeutic exercise/activities with Supervision for 5 minutes with rest breaks PRN within 7 day(s).    6.  Patient will utilize energy conservation techniques during functional activities with verbal cues within 7 day(s).    Outcome: Progressing     OCCUPATIONAL THERAPY TREATMENT  Patient: Marilee Oakes (74 y.o. female)  Date: 12/20/2024  Primary Diagnosis: Osteomyelitis [M86.9]       Precautions: Fall Risk, Other (Comment) (presume NWB LLE (no clarification from ortho/MD))                Chart, occupational therapy assessment, plan of care, and goals were reviewed.    ASSESSMENT  Patient continues to benefit from skilled OT services and is progressing towards goals. Markedly improved mobility with L CAM boot, able to WBAT to complete standing perineal hygiene with intermittent Min A for balance and functional reach. Increased A for distal access to don L CAM boot and brief, tachycardic with HR up to 131 with brief dizziness, VSS. With rest breaks, able

## 2024-12-20 NOTE — PROGRESS NOTES
Pulmonary, Critical Care, and Sleep Medicine~Progress Note    Name: Marilee Oakes MRN: [id number]   : 1950 Hospital: [facility]   Date: 2024 11:19 AM Admission: 2024     Impression Plan   MRSA bacteremia, source?  Left foot wound?  Pacemaker?  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  It could be used when she feels her throat feeling tight and short of breath and hoarseness.  Will need evaluation with speech therapist following discharge. Technique seems to have helped some   On bronchodilators  Antibiotics per ID  No indication for steroids at this time  DVT prophylaxis  O2 titration above 90%  PUD prophylaxis  Will have her follow back up with Dr. Moreira in 1 to 2 weeks following discharge     Daily Progression:      Breathing is better today  No wheeze       Consult Note request by hospitalist service.     Patient presented for admission on 2024 secondary to [facility] ER and was laterally transferred to [facility].  She reports chills body aches and cough over the last 3 days prior to admission.  proBNP yesterday was 27,000.  Echocardiogram pending but she did have an echo in 2023 with an EF of 55 to 60% with aortic valve stenosis and mitral regurgitation.  No shunt was seen on that echo.  Patient presented for left foot wound and bacteremia that showed MRSA.  Urine cultures showed E. coli extended spectrum beta-lactamase .  ABG done today did not show any CO2 retention.  She is a never smoker.  She does carry diagnosis of asthma and is followed by outpatient pulmonologist Dr. Moreira.  Is not on outpatient bronchodilators because she is doing very well.  Of note she has had peripheral eosinophilia as high as 600 cells per microliter in September.  Today  LAD    Chest: No pectus deformity, normal chest rise b/l    HEART:  RRR, no murmurs/rubs/gallops    Lungs:  CTA b/l, no rhonchi/crackles/wheeze, diminished BS at bases    ABD: Soft/NT, non rigid mildly distended    EXT: No cyanosis/clubbing/edema, normal peripheral pulses    Skin: No rashes or ulcers, no mottling    Neuro: A/O x 3        Medications:  Current Facility-Administered Medications   Medication Dose Route Frequency    bumetanide (BUMEX) tablet 1 mg  1 mg Oral BID    miconazole (MICOTIN) 2 % powder   Topical BID    budesonide (PULMICORT) nebulizer suspension 250 mcg  0.25 mg Nebulization BID RT    And    arformoterol tartrate (BROVANA) nebulizer solution 15 mcg  15 mcg Nebulization BID RT    And    ipratropium (ATROVENT) 0.02 % nebulizer solution 0.5 mg  0.5 mg Nebulization BID RT    amLODIPine (NORVASC) tablet 5 mg  5 mg Oral Daily    carvedilol (COREG) tablet 25 mg  25 mg Oral BID with meals    insulin lispro (HUMALOG,ADMELOG) injection vial 0-8 Units  0-8 Units SubCUTAneous 4x Daily AC & HS    glucose chewable tablet 16 g  4 tablet Oral PRN    dextrose bolus 10% 125 mL  125 mL IntraVENous PRN    Or    dextrose bolus 10% 250 mL  250 mL IntraVENous PRN    glucagon injection 1 mg  1 mg SubCUTAneous PRN    dextrose 10 % infusion   IntraVENous Continuous PRN    vancomycin (VANCOCIN) 1000 mg in 200 mL IVPB  1,000 mg IntraVENous Q24H    ferrous sulfate (IRON 325) tablet 325 mg  325 mg Oral Daily with breakfast    aspirin chewable tablet 81 mg  81 mg Oral Daily    pantoprazole (PROTONIX) tablet 40 mg  40 mg Oral QAM AC    pregabalin (LYRICA) capsule 75 mg  75 mg Oral Daily    QUEtiapine (SEROQUEL) tablet 25 mg  25 mg Oral QHS    sodium chloride flush 0.9 % injection 5-40 mL  5-40 mL IntraVENous 2 times per day    sodium chloride flush 0.9 % injection 5-40 mL  5-40 mL IntraVENous PRN    0.9 % sodium chloride infusion   IntraVENous PRN    potassium chloride (KLOR-CON) extended release tablet 40 mEq  40 mEq Oral

## 2024-12-20 NOTE — PROGRESS NOTES
Request for JUAN MANUEL received.    Clear obvious source for bacteremia is present (osteomyelitis).  JUAN MANUEL done earlier this year for same reason did not show vegetations.  ID would like one anyway.  Will schedule for Monday.    NPO fro midnight Sunday.

## 2024-12-20 NOTE — PLAN OF CARE
Problem: Physical Therapy - Adult  Goal: By Discharge: Performs mobility at highest level of function for planned discharge setting.  See evaluation for individualized goals.  Description: FUNCTIONAL STATUS PRIOR TO ADMISSION: Patient was modified independent using a rollator for functional mobility within the home. Patient utilized a manual wheelchair when out of the home with her  assisting. Had been receiving  PT/OT/nursing. Per patient, she had been WBAT in boot on LLE.    HOME SUPPORT PRIOR TO ADMISSION: The patient lived with her  and required moderate assistance for mobility/ADL's on \"bad days\" but otherwise was independent.    Physical Therapy Goals  Initiated 12/19/2024  1.  Patient will move from supine to sit and sit to supine, scoot up and down, and roll side to side in bed with modified independence within 7 day(s).    2.  Patient will perform sit to stand with minimal assistance within 7 day(s).  3.  Patient will transfer from bed to chair and chair to bed with minimal assistance using the least restrictive device within 7 day(s).    Outcome: Progressing   PHYSICAL THERAPY TREATMENT    Patient: Marilee Oakes (74 y.o. female)  Date: 12/20/2024  Diagnosis: Osteomyelitis [M86.9] Osteomyelitis      Precautions: Fall Risk, Weight Bearing   Left Lower Extremity Weight Bearing: Weight Bearing As Tolerated (in CAM Boot only)           Required Braces or Orthoses  Left Lower Extremity Brace: Boot  LLE Brace Type: CAM boot      ASSESSMENT:  Patient continues to benefit from skilled PT services and is progressing towards goals. Patient performed bed mobility, transfers (sit-stand several times for hygiene and changing brief), walked about 3 ft to recliner at bed side. Patient now has CAM boot, so applied that to LLE and shoe to RLE. Able to WBAT, so mobility much improved over yesterday when she ws NWB'ing LLE. Minimal assistance and contact guard required for mobility. Left up in recliner at  bed side. Can transfer to [facility] with RW and staff assistance.         PLAN:  Patient continues to benefit from skilled intervention to address the above impairments.  Continue treatment per established plan of care.    Recommendations for staff mobility and toileting assistance:  Recommend toileting using  recommended toilet device: a bedside commode with staff assist x1 using  rolling walker.  And CAM boot.    Recommendation for discharge: (in order for the patient to meet his/her long term goals):   Continue to assess pending progress    Other factors to consider for discharge: high risk for falls, pain and far from PLOF.    IF patient discharges home will need the following DME: continuing to assess with progress       SUBJECTIVE:   Patient stated, \"My foot is hurting today.\"    OBJECTIVE DATA SUMMARY:   Critical Behavior:  Orientation  Orientation Level: Oriented X4  Cognition  Overall Cognitive Status: WNL  Arousal/Alertness: Appears intact  Following Commands: Appears intact  Attention Span: Appears intact  Memory: Appears intact  Safety Judgement: Appears intact  Problem Solving: Appears intact  Insights: Appears intact  Initiation: Appears intact  Sequencing: Appears intact    Functional Mobility Training:  Bed Mobility:  Bed Mobility Training  Bed Mobility Training: Yes  Overall Level of Assistance: Stand-by assistance;Adaptive equipment  Interventions: Safety awareness training;Verbal cues  Rolling: Stand-by assistance;Adaptive equipment  Supine to Sit: Stand-by assistance;Adaptive equipment  Sit to Supine:  (left up in chair at bed side)  Scooting: Stand-by assistance;Additional time  Transfers:  Transfer Training  Transfer Training: Yes  Overall Level of Assistance: Minimum assistance;Contact-guard assistance;Adaptive equipment  Interventions: Demonstration;Manual cues;Safety awareness training;Tactile cues  Sit to Stand: Minimum assistance;Contact-guard assistance;Adaptive equipment  Stand to Sit:

## 2024-12-20 NOTE — CARE COORDINATION
ANGELIQUE:    Anticipated d/c home with HH. Lives with supportive . Open with [facility] - CM placed [facility]AS[facility] for PT, OT, and SN - accepted. CM updated Miguel Garrido with [facility] via [employer]    CM met with Pt and  in room regarding HH. They have been in touch with [facility] as well and are keeping them apprised of patient status.     Podiatry following, IV ABX - ID following, cultures pending; CM will follow for any ongoing/new d/c needs.   --    Patient uses a walker at baseline but also owns w/c, has a ramp, H bed, BSC, and rollator. She does not drive but lives with  who drives her to appointment.     Other services:   [facility] current (open for SN, PT, and OT)  [facility] for home O2 current  [facility] prior  [facility], [facility], [facility] prior    Transition of Care Plan:    RUR: 35%  Prior Level of Functioning: home with HH and 's help also prior SNFs  Disposition: Home with HH  ASHANTI: 12/23  If SNF or IPR: Date FOC offered:   Date FOC received:   Accepting facility:   Date authorization started with reference number:   Date authorization received and expires:   Follow up appointments:   DME needed: none  Transportation at discharge: family or BLS  IM/IMM Medicare/ letter given: y  Is patient a Anatone and connected with [facility]?    If yes, was  transfer form completed and [facility] notified?   Caregiver Contact:  Wesley Oakes [phone]   Discharge Caregiver contacted prior to discharge?   Care Conference needed?   Barriers to discharge:  clinical, cultures pending

## 2024-12-20 NOTE — PROGRESS NOTES
Infectious Disease Progress     INFECTIOUS DISEASE Attending:     I agree with the above infectious disease daily progress note in its entirety as authored by and discussed in detail with the nurse practitioner.   I have reviewed pertinent laboratory studies, microbiology cultures, radiologic reports with review of the consultations and progress notes as appropriate.   I agree with today's subjective findings, physical examination, assessment and plan of care as described above and discussed extensively with the nurse practitioner.       Afebrile and breathing better overall, LLE in brace.  No diarrhea.    Exam with patient in NAD, supple neck, improved resp effort, LE b/l +2 pitting edema.  LLE in brace.    Plan:    Continue Vancomycin.    Asked micro lab to perform Ceftaroline sensitivities.    Follow repeat blood cultures x2 sets.    JUAN MANUEL - prior JUAN MANUEL 4/2024 no involvement of ICD but has likely been bacteremic for prolonged duration, could have seeded it.    Follow LLE bone scan.    A total time of 30 minutes was spent on today's encounter.  Greater than 50% of the time was spent on the following:  Preparing for visit and chart review.  Obtaining and/or reviewing separately obtained history  Performing a medically appropriate exam and/or evaluation  Counseling and educating a patient/family/caregiver as noted above  Placing relevant orders  Referring and communicating with other professionals (not separately reported)  Independently interpreting results (not separately reported) and communicating results to the patient/family/caregiver  Care coordination (not separately reported) as noted above  Documenting clinical information in the electronic health records (e.g. problem list, visit note) on the day of the encounter      Impression:   High Grade MRSA Bacteremia  OM of left foot  LLE hardware infection  Concerned with PPM infection  - afebrile, wbc 4.1     Blood cx (12/16) MRSA, (12/18) no growth so far    HILARY on  the level of the ankle joint and talus, and level of the tibiotalar joint. No union of the tibiotalar or subtalar joints is demonstrated. Fractures of the medial and lateral malleolus show areas of bridging bone consistent with healing though lucency at the fracture sites remains. Extensive atherosclerotic calcifications are noted. There is mild diffuse fatty atrophy of muscle, greater in the posterior compartment of the leg. Skin and soft tissue retraction is demonstrated laterally in the distal leg at the level of the distal fibular diaphysis, and medially and laterally at the ankle at the level of the talus.     1. Hardware removal related to retrograde [id number] nail and medial and lateral ankle fixation as above. There is diffuse tibial periosteal new bone formation, and abundant tibial as well as soft tissue attenuation changes including soft tissue confluent attenuation surrounding the distal tibia through ankle. Note that the plantar aspect of the foot at the level the anterior process of the calcaneus and and cuboid bone are not included in the field-of-view. If there is concern for interosseous and soft tissue collections and other evidence for active infection, further evaluation with contrast-enhanced MRI and/or nuclear medicine assessment including gallium and white cell scanning can be be obtained. 2. No ankle or subtalar fusion demonstrated. 3. Fractures of medial and lateral malleoli showing areas of bony bridging. Electronically signed by Matt Hammonds MD      Thank you for the opportunity to participate in the care of this patient.   Please contact with questions or concerns.      The above plan of care that has been discussed and agreed upon by Dr. Cornelius.  A total time of 20 minutes was spent on today's encounter.  Greater than 50% of the time was spent on the following:  Preparing for visit and chart review.  Obtaining and/or reviewing separately obtained history  Performing a

## 2024-12-20 NOTE — CONSULTS
Infectious Disease Consult Note    Assessment / Plan:       75 y/o female with recurrent MRSA bacteremia from LLE hardware infection and osteomyelitis and very concerning for involvement of ICD.    Although source is likely LLE, patient may have seeded LCW ICD.    Continue Vancomycin.    Pending bone scan per orthopedics.    Follow repeat blood cultures x2 sets.    TTE - not done yet.         Reason for Consult: MRSA Bacteremia  Date of Consultation: December 19, 2024  Date of Admission: 12/17/2024  Referring Physician: Dr. Pham    HPI:    75 y/o female with CHF, COPD, A-fib s/p ppm and Watchman, CAD, DM2 seen previously for ORIF 3/2024 ORIF LLE with ankle fracture complicated by 4/2024 with drainage and MRSA bacteremia and non-union of fracture s/p I/D and hardware removal 4/2024 and prolonged IV antibiotic course.  Then on 6/25/2024 underwent LLE tibiotalar intramedullary nail fusion.  This was also unfortunately complicated by MRSA infection s/p hardware removal and application of bone cement due to osteomyelitis from hardware infection and treated with prolonged Doxycycline course as it appears that patient declined IV antibiotic therapy.  Course completed with PO Doxycycline with reportedly good adherence on 12/3/24.  Multiple prior JUAN MANUEL without evidence of PPM lead vegetation nor endocarditis.    Admitted to [facility] for 2 days myalgias and chills, malaise, fever, vomiting.  Workup with MRSA recurrent bacteremia.  Started on Vanco/Cefepime.  X-ray without osteomyelitis on LLE.  C/o SOB.    Interval events:    Breathing improved, afebrile, more comfortable    Past Medical History:  Past Medical History:   Diagnosis Date    Acute and chronic respiratory failure with hypoxia     Age-related osteoporosis without current pathological fracture     Age-related osteoporosis without current pathological fracture     Anemia in chronic kidney disease     Anxiety and depression 01/22/2018    Arthritis     OA    Asthma

## 2024-12-20 NOTE — PROGRESS NOTES
Hospitalist Progress Note  Estuardo Johnson MD  Answering service: [phone]        Date of Service:  2024  NAME:  Marilee Oakes  :  1950  MRN:  [id number]      Admission Summary:     Patient admitted with left foot wound.    Interval history / Subjective:     Patient denies any significant pain in the left leg.     Assessment & Plan:     Left foot wound  -Chronic left foot wound, x-ray shows chronic appearing changes in the distal tibia and fibula as well as ankle, osteomyelitis is difficult to exclude, noted elevated CRP  -Initially on cefepime and vancomycin, cefepime was now discontinued  -Continue on vancomycin, plan for triple phase bone scan per orthopedic, if positive vascular surgery consult for possible amputation  -ID and orthopedic following    Bacteremia  -Initial blood cultures  growing MRSA 3 of 4 bottles, repeat blood cultures  so far negative  -Continue with vancomycin, ID following  -ID recommended cardiology consult for JUAN MANUEL     Urinary tract infection  -Urinalysis shows moderate blood, large leukocyte esterase, pyuria and bacteriuria  -Urine culture growing ESBL E. Coli  -ID managing    Elevated troponin  -Likely due to infectious process  -ACS ruled out by cardiology    Hypertension  -Continue amlodipine and Coreg  -Monitor blood pressure     Elevated alk phos  -Likely source from the bones, noted cortical destruction on the left foot x-ray  -Continue monitor     Asthma  -Stable without exacerbation  -Continue home bronchodilators  -Breathing treatment as needed     CHF  -Patient with chronic CHF, last echo with normal EF  -Patient was short of breath yesterday and CTA of the chest shows mild pulmonary edema  -Increased Bumex to 1 mg twice daily, monitor respiratory status     CKD stage III  -Creatinine stable at baseline  -Continue monitor     History of atrial fibrillation  -S/p  AV ruben ablation and pacemaker placement  -S/p Watchman placement, no anticoagulation needed     Code status: Full  Prophylaxis: Lovenox  Care Plan discussed with: Patient  Anticipated Disposition: Likely more than 48 hours  Central Line:   None             Review of Systems:   Pertinent items are noted in HPI.         Vital Signs:    Last 24hrs VS reviewed since prior progress note. Most recent are:  Vitals:    12/20/24 1200   BP:    Pulse: 81   Resp:    Temp:    SpO2:          Intake/Output Summary (Last 24 hours) at 12/20/2024 1304  Last data filed at 12/19/2024 1330  Gross per 24 hour   Intake --   Output 700 ml   Net -700 ml        Physical Examination:     I had a face to face encounter with this patient and independently examined them on 12/20/2024 as outlined below:          General : alert x 3, awake, no acute distress,   HEENT: PEERL, EOMI, moist mucus membrane, TM clear  Neck: supple, no JVD, no meningeal signs  Chest: Clear to auscultation bilaterally   CVS: S1 S2 heard, Capillary refill less than 2 seconds  Abd: soft/ non tender, non distended, BS physiological,   Ext: no clubbing, no cyanosis, no edema, brisk 2+ DP pulses  Neuro/Psych: pleasant mood and affect, CN 2-12 grossly intact, sensory grossly within normal limit, Strength 5/5 in all extremities, DTR 1+ x 4  Skin: warm            Data Review:    Review and/or order of clinical lab test    I have independently reviewed and interpreted patient's lab and all other diagnostic data    Notes reviewed from all clinical/nonclinical/nursing services involved in patient's clinical care. Care coordination discussions were held with appropriate clinical/nonclinical/ nursing providers based on care coordination needs.     Labs:     Recent Labs     12/19/24  0354 12/20/24  0553   WBC 4.1 4.5   HGB 9.0* 10.0*   HCT 28.8* 33.5*    192     Recent Labs     12/18/24  0625 12/18/24  1951 12/19/24  0354 12/20/24  0553     --  140 143   K 2.6* 3.1* 3.4*  breakfast    aspirin chewable tablet 81 mg  81 mg Oral Daily    pantoprazole (PROTONIX) tablet 40 mg  40 mg Oral QAM AC    pregabalin (LYRICA) capsule 75 mg  75 mg Oral Daily    QUEtiapine (SEROQUEL) tablet 25 mg  25 mg Oral QHS    sodium chloride flush 0.9 % injection 5-40 mL  5-40 mL IntraVENous 2 times per day    sodium chloride flush 0.9 % injection 5-40 mL  5-40 mL IntraVENous PRN    0.9 % sodium chloride infusion   IntraVENous PRN    potassium chloride (KLOR-CON) extended release tablet 40 mEq  40 mEq Oral PRN    Or    potassium bicarb-citric acid (EFFER-K) effervescent tablet 40 mEq  40 mEq Oral PRN    Or    potassium chloride 10 mEq/100 mL IVPB (Peripheral Line)  10 mEq IntraVENous PRN    magnesium sulfate 2000 mg in 50 mL IVPB premix  2,000 mg IntraVENous PRN    enoxaparin Sodium (LOVENOX) injection 30 mg  30 mg SubCUTAneous Q12H    ondansetron (ZOFRAN-ODT) disintegrating tablet 4 mg  4 mg Oral Q8H PRN    Or    ondansetron (ZOFRAN) injection 4 mg  4 mg IntraVENous Q6H PRN    polyethylene glycol (GLYCOLAX) packet 17 g  17 g Oral Daily PRN    acetaminophen (TYLENOL) tablet 650 mg  650 mg Oral Q6H PRN    Or    acetaminophen (TYLENOL) suppository 650 mg  650 mg Rectal Q6H PRN    Vancomycin - Pharmacy to Dose   Other RX Placeholder     ______________________________________________________________________  EXPECTED LENGTH OF STAY: 6  ACTUAL LENGTH OF STAY:          3                 Estuardo Johnson MD

## 2024-12-20 NOTE — PROGRESS NOTES
Ortho progress note -    2024  Admit Date:   2024    Post Op day: * No surgery found *    Subjective:    Marilee Oakes Presents with left leg pain, history of multiple surgeries (see Tani Rothman's note)         Vital Signs:    Patient Vitals for the past 8 hrs:   BP Temp Temp src Pulse Resp SpO2   24 1525 (!) 142/78 97.6 °F (36.4 °C) Oral 80 18 95 %   24 1200 -- -- -- 81 -- --     Temp (24hrs), Av.9 °F (36.6 °C), Min:97.6 °F (36.4 °C), Max:98.2 °F (36.8 °C)        LAB:    Recent Labs     24  0553   HCT 33.5*   HGB 10.0*         Physical Exam:  Appearance: resting comfortably, NAD, aox3  Neurovascular exam: Grossly intact  Dressing/Wound: clean and dry, no significant drainage present    Assessment & Physician's Comment:  Post Op day: NO surgical treatments during this admission so far      Plan:    Pain control  IV antibiotics as per ID.   Patient with osteomyelitis more likely involving the foot calcaneus/talus and tibia s/p 4 prior procedures (ankle fracture ORIF and removal deep HW by Dr. Farnsworth and TTC arthrodesis attempt with retrograde IM nail, removal after infection by Dr. Chan).    In my opinion, osteomyelitis in diabetic patients in addition to kidney disease, is difficult to resolve and may require amputation. I had recommended another opinion from [facility] as patient was interested in other procedures that may salvage the limb.  I was not able to offer any other alternatives other than suppressive treatments with antibiotics and walking in a boot, versus amputation.  She has other medical issues that compromise another surgery, especially one that would require open incisions which are also prone for not healing and getting further infection.  If patient elects to proceed with other procedures, she can proceed.  I have discussed this with the patient and her  today.  I have also reviewed the bone scan completed today which shows likely osteomyelitis.         Maame Chan MD   Orthopedic Surgeon

## 2024-12-21 LAB
ALBUMIN SERPL-MCNC: 2.8 G/DL (ref 3.5–5)
ALBUMIN/GLOB SERPL: 0.9 (ref 1.1–2.2)
ALP SERPL-CCNC: 195 U/L (ref 45–117)
ALT SERPL-CCNC: 15 U/L (ref 12–78)
ANION GAP SERPL CALC-SCNC: 7 MMOL/L (ref 2–12)
AST SERPL-CCNC: 16 U/L (ref 15–37)
BACTERIA SPEC CULT: ABNORMAL
BACTERIA SPEC CULT: ABNORMAL
BASOPHILS # BLD: 0 K/UL (ref 0–0.1)
BASOPHILS NFR BLD: 1 % (ref 0–1)
BILIRUB SERPL-MCNC: 0.4 MG/DL (ref 0.2–1)
BUN SERPL-MCNC: 26 MG/DL (ref 6–20)
BUN/CREAT SERPL: 17 (ref 12–20)
CALCIUM SERPL-MCNC: 9 MG/DL (ref 8.5–10.1)
CHLORIDE SERPL-SCNC: 108 MMOL/L (ref 97–108)
CO2 SERPL-SCNC: 29 MMOL/L (ref 21–32)
CREAT SERPL-MCNC: 1.49 MG/DL (ref 0.55–1.02)
DIFFERENTIAL METHOD BLD: ABNORMAL
EOSINOPHIL # BLD: 0.3 K/UL (ref 0–0.4)
EOSINOPHIL NFR BLD: 8 % (ref 0–7)
ERYTHROCYTE [DISTWIDTH] IN BLOOD BY AUTOMATED COUNT: 15.5 % (ref 11.5–14.5)
GLOBULIN SER CALC-MCNC: 3 G/DL (ref 2–4)
GLUCOSE BLD STRIP.AUTO-MCNC: 123 MG/DL (ref 65–117)
GLUCOSE BLD STRIP.AUTO-MCNC: 180 MG/DL (ref 65–117)
GLUCOSE BLD STRIP.AUTO-MCNC: 202 MG/DL (ref 65–117)
GLUCOSE BLD STRIP.AUTO-MCNC: 238 MG/DL (ref 65–117)
GLUCOSE SERPL-MCNC: 110 MG/DL (ref 65–100)
HCT VFR BLD AUTO: 32.6 % (ref 35–47)
HGB BLD-MCNC: 9.8 G/DL (ref 11.5–16)
IMM GRANULOCYTES # BLD AUTO: 0 K/UL (ref 0–0.04)
IMM GRANULOCYTES NFR BLD AUTO: 1 % (ref 0–0.5)
LYMPHOCYTES # BLD: 0.8 K/UL (ref 0.8–3.5)
LYMPHOCYTES NFR BLD: 19 % (ref 12–49)
MCH RBC QN AUTO: 27 PG (ref 26–34)
MCHC RBC AUTO-ENTMCNC: 30.1 G/DL (ref 30–36.5)
MCV RBC AUTO: 89.8 FL (ref 80–99)
MONOCYTES # BLD: 0.4 K/UL (ref 0–1)
MONOCYTES NFR BLD: 9 % (ref 5–13)
NEUTS SEG # BLD: 2.6 K/UL (ref 1.8–8)
NEUTS SEG NFR BLD: 62 % (ref 32–75)
NRBC # BLD: 0 K/UL (ref 0–0.01)
NRBC BLD-RTO: 0 PER 100 WBC
PLATELET # BLD AUTO: 198 K/UL (ref 150–400)
PMV BLD AUTO: 11.1 FL (ref 8.9–12.9)
POTASSIUM SERPL-SCNC: 3.4 MMOL/L (ref 3.5–5.1)
PROT SERPL-MCNC: 5.8 G/DL (ref 6.4–8.2)
RBC # BLD AUTO: 3.63 M/UL (ref 3.8–5.2)
SERVICE CMNT-IMP: ABNORMAL
SODIUM SERPL-SCNC: 144 MMOL/L (ref 136–145)
WBC # BLD AUTO: 4.2 K/UL (ref 3.6–11)

## 2024-12-21 PROCEDURE — 85025 COMPLETE CBC W/AUTO DIFF WBC: CPT

## 2024-12-21 PROCEDURE — 6360000002 HC RX W HCPCS: Performed by: FAMILY MEDICINE

## 2024-12-21 PROCEDURE — 94640 AIRWAY INHALATION TREATMENT: CPT

## 2024-12-21 PROCEDURE — 6370000000 HC RX 637 (ALT 250 FOR IP)

## 2024-12-21 PROCEDURE — 2580000003 HC RX 258: Performed by: FAMILY MEDICINE

## 2024-12-21 PROCEDURE — 36415 COLL VENOUS BLD VENIPUNCTURE: CPT

## 2024-12-21 PROCEDURE — 1100000000 HC RM PRIVATE

## 2024-12-21 PROCEDURE — 6370000000 HC RX 637 (ALT 250 FOR IP): Performed by: FAMILY MEDICINE

## 2024-12-21 PROCEDURE — 82962 GLUCOSE BLOOD TEST: CPT

## 2024-12-21 PROCEDURE — 80053 COMPREHEN METABOLIC PANEL: CPT

## 2024-12-21 PROCEDURE — 2500000003 HC RX 250 WO HCPCS: Performed by: FAMILY MEDICINE

## 2024-12-21 PROCEDURE — 99232 SBSQ HOSP IP/OBS MODERATE 35: CPT | Performed by: INTERNAL MEDICINE

## 2024-12-21 RX ADMIN — BUDESONIDE 250 MCG: 0.25 INHALANT RESPIRATORY (INHALATION) at 18:50

## 2024-12-21 RX ADMIN — CARVEDILOL 25 MG: 12.5 TABLET, FILM COATED ORAL at 11:44

## 2024-12-21 RX ADMIN — BUMETANIDE 1 MG: 1 TABLET ORAL at 11:47

## 2024-12-21 RX ADMIN — INSULIN LISPRO 2 UNITS: 100 INJECTION, SOLUTION INTRAVENOUS; SUBCUTANEOUS at 12:14

## 2024-12-21 RX ADMIN — SODIUM CHLORIDE, PRESERVATIVE FREE 10 ML: 5 INJECTION INTRAVENOUS at 23:36

## 2024-12-21 RX ADMIN — ACETAMINOPHEN 650 MG: 325 TABLET ORAL at 23:36

## 2024-12-21 RX ADMIN — PREGABALIN 75 MG: 75 CAPSULE ORAL at 11:46

## 2024-12-21 RX ADMIN — SODIUM CHLORIDE 25 ML: 9 INJECTION, SOLUTION INTRAVENOUS at 13:12

## 2024-12-21 RX ADMIN — CARVEDILOL 25 MG: 12.5 TABLET, FILM COATED ORAL at 17:49

## 2024-12-21 RX ADMIN — QUETIAPINE FUMARATE 25 MG: 25 TABLET ORAL at 23:35

## 2024-12-21 RX ADMIN — MICONAZOLE NITRATE: 2 POWDER TOPICAL at 23:36

## 2024-12-21 RX ADMIN — ASPIRIN 81 MG CHEWABLE TABLET 81 MG: 81 TABLET CHEWABLE at 11:45

## 2024-12-21 RX ADMIN — MICONAZOLE NITRATE: 2 POWDER TOPICAL at 11:48

## 2024-12-21 RX ADMIN — ENOXAPARIN SODIUM 30 MG: 100 INJECTION SUBCUTANEOUS at 23:35

## 2024-12-21 RX ADMIN — AMLODIPINE BESYLATE 5 MG: 5 TABLET ORAL at 11:46

## 2024-12-21 RX ADMIN — VANCOMYCIN 1000 MG: 1 INJECTION, SOLUTION INTRAVENOUS at 13:13

## 2024-12-21 RX ADMIN — BUMETANIDE 1 MG: 1 TABLET ORAL at 17:49

## 2024-12-21 RX ADMIN — INSULIN LISPRO 2 UNITS: 100 INJECTION, SOLUTION INTRAVENOUS; SUBCUTANEOUS at 17:45

## 2024-12-21 RX ADMIN — FERROUS SULFATE TAB 325 MG (65 MG ELEMENTAL FE) 325 MG: 325 (65 FE) TAB at 11:45

## 2024-12-21 RX ADMIN — IPRATROPIUM BROMIDE 0.5 MG: 0.5 SOLUTION RESPIRATORY (INHALATION) at 18:50

## 2024-12-21 RX ADMIN — TRAMADOL HYDROCHLORIDE 50 MG: 50 TABLET, COATED ORAL at 07:44

## 2024-12-21 RX ADMIN — ENOXAPARIN SODIUM 30 MG: 100 INJECTION SUBCUTANEOUS at 11:47

## 2024-12-21 RX ADMIN — ARFORMOTEROL TARTRATE 15 MCG: 15 SOLUTION RESPIRATORY (INHALATION) at 18:50

## 2024-12-21 RX ADMIN — INSULIN LISPRO 2 UNITS: 100 INJECTION, SOLUTION INTRAVENOUS; SUBCUTANEOUS at 23:35

## 2024-12-21 RX ADMIN — POTASSIUM CHLORIDE 40 MEQ: 750 TABLET, FILM COATED, EXTENDED RELEASE ORAL at 11:42

## 2024-12-21 RX ADMIN — SODIUM CHLORIDE, PRESERVATIVE FREE 10 ML: 5 INJECTION INTRAVENOUS at 11:47

## 2024-12-21 RX ADMIN — PANTOPRAZOLE SODIUM 40 MG: 40 TABLET, DELAYED RELEASE ORAL at 07:03

## 2024-12-21 ASSESSMENT — PAIN DESCRIPTION - ORIENTATION
ORIENTATION: LEFT
ORIENTATION: LEFT

## 2024-12-21 ASSESSMENT — PAIN DESCRIPTION - LOCATION
LOCATION: LEG
LOCATION: FOOT

## 2024-12-21 ASSESSMENT — PAIN SCALES - GENERAL
PAINLEVEL_OUTOF10: 5
PAINLEVEL_OUTOF10: 8

## 2024-12-21 ASSESSMENT — PAIN DESCRIPTION - DESCRIPTORS: DESCRIPTORS: ACHING

## 2024-12-21 NOTE — PLAN OF CARE
Problem: Chronic Conditions and Co-morbidities  Goal: Patient's chronic conditions and co-morbidity symptoms are monitored and maintained or improved  Outcome: Progressing     Problem: Discharge Planning  Goal: Discharge to home or other facility with appropriate resources  12/21/2024 0004 by James Jay LPN  Outcome: Progressing  12/20/2024 1155 by Germain Topete RN  Outcome: Progressing     Problem: Safety - Adult  Goal: Free from fall injury  12/21/2024 0004 by James Jay LPN  Outcome: Progressing  12/20/2024 1155 by Germain Topete RN  Outcome: Progressing     Problem: Physical Therapy - Adult  Goal: By Discharge: Performs mobility at highest level of function for planned discharge setting.  See evaluation for individualized goals.  Description: FUNCTIONAL STATUS PRIOR TO ADMISSION: Patient was modified independent using a rollator for functional mobility within the home. Patient utilized a manual wheelchair when out of the home with her  assisting. Had been receiving HH PT/OT/nursing. Per patient, she had been WBAT in boot on LLE.    HOME SUPPORT PRIOR TO ADMISSION: The patient lived with her  and required moderate assistance for mobility/ADL's on \"bad days\" but otherwise was independent.    Physical Therapy Goals  Initiated 12/19/2024  1.  Patient will move from supine to sit and sit to supine, scoot up and down, and roll side to side in bed with modified independence within 7 day(s).    2.  Patient will perform sit to stand with minimal assistance within 7 day(s).  3.  Patient will transfer from bed to chair and chair to bed with minimal assistance using the least restrictive device within 7 day(s).    12/20/2024 1429 by Mora Foster, PT  Outcome: Progressing     Problem: Occupational Therapy - Adult  Goal: By Discharge: Performs self-care activities at highest level of function for planned discharge setting.  See evaluation for individualized goals.  Description: FUNCTIONAL STATUS

## 2024-12-21 NOTE — PROGRESS NOTES
Infectious Disease Progress     Impression:   High Grade MRSA Bacteremia  OM of left foot  LLE hardware infection  Concerned with PPM infection  - afebrile, wbc 4.1     Blood cx (12/16) MRSA, (12/18) no growth so far    HILARY on CKD  CAD  A-fib, s/p ablation  CHF  Elevated troponin  - primary team following    Plan:     - continue with IV vancomycin    - JUAN MANUEL pending Monday will follow to see if ICD infected at this point and/or endocarditis    - Appreciate orthopedic and podiatry input for LLE for source control       History of Present Illness   12/18/2024  \"73 y/o female with CHF, COPD, A-fib s/p ppm and Watchman, CAD, DM2 seen previously for ORIF 3/2024 ORIF LLE with ankle fracture complicated by 4/2024 with drainage and MRSA bacteremia and non-union of fracture s/p I/D and hardware removal 4/2024 and prolonged IV antibiotic course.  Then on 6/25/2024 underwent LLE tibiotalar intramedullary nail fusion.  This was also unfortunately complicated by MRSA infection s/p hardware removal and application of bone cement due to osteomyelitis from hardware infection and treated with prolonged Doxycycline course as it appears that patient declined IV antibiotic therapy.  Course completed with PO Doxycycline with reportedly good adherence on 12/3/24.  Multiple prior JUAN MANUEL without evidence of PPM lead vegetation nor endocarditis.     Admitted to [facility] for 2 days myalgias and chills, malaise, fever, vomiting.  Workup with MRSA recurrent bacteremia.  Started on Vanco/Cefepime.  X-ray without osteomyelitis on LLE.  C/o SOB.\"       Subjective:      C/o generalized weakness and LLE pain  Review of Systems:            Symptom Y/N Comments   Symptom Y/N Comments   Fever/Chills  n     Chest Pain n       Poor Appetite       Edema y       Cough       Abdominal Pain n       Sputum       Joint Pain y       SOB/NOVOA n      Pruritis/Rash        Nausea/vomit n      Tolerating PT/OT        Diarrhea n      Tolerating Diet        Constipation        Destiny Regan PA-C   25 mg at 12/20/24 1629    insulin lispro (HUMALOG,ADMELOG) injection vial 0-8 Units  0-8 Units SubCUTAneous 4x Daily AC & HS Destiny Regan PA-C   2 Units at 12/20/24 2131    glucose chewable tablet 16 g  4 tablet Oral PRN Destiny Regan PA-C        dextrose bolus 10% 125 mL  125 mL IntraVENous PRN Destiny Regan PA-C        Or    dextrose bolus 10% 250 mL  250 mL IntraVENous PRN Destiny Regan PA-C        glucagon injection 1 mg  1 mg SubCUTAneous PRN Destiny Regan PA-C        dextrose 10 % infusion   IntraVENous Continuous PRN Destiny Regan PA-C        vancomycin (VANCOCIN) 1000 mg in 200 mL IVPB  1,000 mg IntraVENous Q24H Estuardo Johnson MD   Stopped at 12/20/24 1457    ferrous sulfate (IRON 325) tablet 325 mg  325 mg Oral Daily with breakfast Estuardo Johnson MD   325 mg at 12/20/24 0955    aspirin chewable tablet 81 mg  81 mg Oral Daily Estuardo Johnson MD   81 mg at 12/20/24 0955    pantoprazole (PROTONIX) tablet 40 mg  40 mg Oral QAM AC Estuardo Johnson MD   40 mg at 12/21/24 0703    pregabalin (LYRICA) capsule 75 mg  75 mg Oral Daily Estuardo Johnson MD   75 mg at 12/20/24 0955    QUEtiapine (SEROQUEL) tablet 25 mg  25 mg Oral QHS Estuardo Johnson MD   25 mg at 12/20/24 2238    sodium chloride flush 0.9 % injection 5-40 mL  5-40 mL IntraVENous 2 times per day Estuardo Johnson MD   10 mL at 12/20/24 2131    sodium chloride flush 0.9 % injection 5-40 mL  5-40 mL IntraVENous PRN Estuardo Johnson MD        0.9 % sodium chloride infusion   IntraVENous PRN Estuardo Johnson MD 25 mL/hr at 12/19/24 1356 New Bag at 12/19/24 1356    potassium chloride (KLOR-CON) extended release tablet 40 mEq  40 mEq Oral PRN Estuardo Johnson MD   40 mEq at 12/19/24 0716    Or    potassium bicarb-citric acid (EFFER-K) effervescent tablet 40 mEq  40 mEq Oral PRN Estuardo Johnson MD        Or    potassium chloride 10 mEq/100 mL IVPB (Peripheral Line)  10 mEq IntraVENous PRN Estuardo Johnson MD        magnesium sulfate 2000 mg in 50 mL IVPB  time of 35 minutes was spent on today's encounter.  Greater than 50% of the time was spent on the following:  Preparing for visit and chart review.  Obtaining and/or reviewing separately obtained history  Performing a medically appropriate exam and/or evaluation  Counseling and educating a patient/family/caregiver as noted above  Referring and communicating with other professionals (not separately reported)  Independently interpreting results (not separately reported) and communicating results to the patient/family/caregiver  Care coordination (not separately reported) as noted above  Documenting clinical information in the electronic health records (e.g. problem list, visit note) on the day of the encounter    Houston Cornelius MD

## 2024-12-21 NOTE — PROGRESS NOTES
Hospitalist Progress Note  Estuardo Johnson MD  Answering service: [phone]        Date of Service:  2024  NAME:  Marilee Oakes  :  1950  MRN:  [id number]      Admission Summary:     Patient admitted with left foot wound.    Interval history / Subjective:     Patient denies any significant pain in the left leg.     Assessment & Plan:     Left foot wound  -Chronic left foot wound, x-ray shows chronic appearing changes in the distal tibia and fibula as well as ankle, osteomyelitis is difficult to exclude, noted elevated CRP  -Follow-up triple phase bone scan positive for osteomyelitis  -Initially on cefepime and vancomycin, cefepime was now discontinued  -Orthopedic recommended amputation, however patient would like to consider other alternatives, considering follow-up podiatry as outpatient and instillation of antibiotic beads  -ID and orthopedic following    Bacteremia  -Initial blood cultures  growing MRSA 3 of 4 bottles, repeat blood cultures  so far negative  -Continue with vancomycin, ID following  -Plan for JUAN MANUEL on Monday     Urinary tract infection  -Urinalysis shows moderate blood, large leukocyte esterase, pyuria and bacteriuria  -Urine culture growing ESBL E. Coli  -ID managing    Elevated troponin  -Likely due to infectious process  -ACS ruled out by cardiology    Hypertension  -Continue amlodipine and Coreg  -Monitor blood pressure     Elevated alk phos  -Likely source from the bones, noted cortical destruction on the left foot x-ray  -Continue monitor     Asthma  -Stable without exacerbation  -Continue home bronchodilators  -Breathing treatment as needed     CHF  -Patient with chronic CHF, last echo with normal EF  -Patient was short of breath yesterday and CTA of the chest shows mild pulmonary edema  -Increased Bumex to 1 mg twice daily, monitor respiratory status     CKD stage    IntraVENous Continuous PRN    vancomycin (VANCOCIN) 1000 mg in 200 mL IVPB  1,000 mg IntraVENous Q24H    ferrous sulfate (IRON 325) tablet 325 mg  325 mg Oral Daily with breakfast    aspirin chewable tablet 81 mg  81 mg Oral Daily    pantoprazole (PROTONIX) tablet 40 mg  40 mg Oral QAM AC    pregabalin (LYRICA) capsule 75 mg  75 mg Oral Daily    QUEtiapine (SEROQUEL) tablet 25 mg  25 mg Oral QHS    sodium chloride flush 0.9 % injection 5-40 mL  5-40 mL IntraVENous 2 times per day    sodium chloride flush 0.9 % injection 5-40 mL  5-40 mL IntraVENous PRN    0.9 % sodium chloride infusion   IntraVENous PRN    potassium chloride (KLOR-CON) extended release tablet 40 mEq  40 mEq Oral PRN    Or    potassium bicarb-citric acid (EFFER-K) effervescent tablet 40 mEq  40 mEq Oral PRN    Or    potassium chloride 10 mEq/100 mL IVPB (Peripheral Line)  10 mEq IntraVENous PRN    magnesium sulfate 2000 mg in 50 mL IVPB premix  2,000 mg IntraVENous PRN    enoxaparin Sodium (LOVENOX) injection 30 mg  30 mg SubCUTAneous Q12H    ondansetron (ZOFRAN-ODT) disintegrating tablet 4 mg  4 mg Oral Q8H PRN    Or    ondansetron (ZOFRAN) injection 4 mg  4 mg IntraVENous Q6H PRN    polyethylene glycol (GLYCOLAX) packet 17 g  17 g Oral Daily PRN    acetaminophen (TYLENOL) tablet 650 mg  650 mg Oral Q6H PRN    Or    acetaminophen (TYLENOL) suppository 650 mg  650 mg Rectal Q6H PRN    Vancomycin - Pharmacy to Dose   Other RX Placeholder     ______________________________________________________________________  EXPECTED LENGTH OF STAY: 6  ACTUAL LENGTH OF STAY:          4                 Estuardo Johnson MD

## 2024-12-22 LAB
ANION GAP SERPL CALC-SCNC: 6 MMOL/L (ref 2–12)
BUN SERPL-MCNC: 25 MG/DL (ref 6–20)
BUN/CREAT SERPL: 18 (ref 12–20)
CALCIUM SERPL-MCNC: 9.1 MG/DL (ref 8.5–10.1)
CHLORIDE SERPL-SCNC: 109 MMOL/L (ref 97–108)
CO2 SERPL-SCNC: 29 MMOL/L (ref 21–32)
CREAT SERPL-MCNC: 1.37 MG/DL (ref 0.55–1.02)
GLUCOSE BLD STRIP.AUTO-MCNC: 138 MG/DL (ref 65–117)
GLUCOSE BLD STRIP.AUTO-MCNC: 215 MG/DL (ref 65–117)
GLUCOSE BLD STRIP.AUTO-MCNC: 226 MG/DL (ref 65–117)
GLUCOSE BLD STRIP.AUTO-MCNC: 230 MG/DL (ref 65–117)
GLUCOSE SERPL-MCNC: 114 MG/DL (ref 65–100)
POTASSIUM SERPL-SCNC: 3.5 MMOL/L (ref 3.5–5.1)
SERVICE CMNT-IMP: ABNORMAL
SODIUM SERPL-SCNC: 144 MMOL/L (ref 136–145)
VANCOMYCIN SERPL-MCNC: 20.7 UG/ML

## 2024-12-22 PROCEDURE — 6370000000 HC RX 637 (ALT 250 FOR IP)

## 2024-12-22 PROCEDURE — 6360000002 HC RX W HCPCS: Performed by: FAMILY MEDICINE

## 2024-12-22 PROCEDURE — 2500000003 HC RX 250 WO HCPCS: Performed by: FAMILY MEDICINE

## 2024-12-22 PROCEDURE — 6370000000 HC RX 637 (ALT 250 FOR IP): Performed by: FAMILY MEDICINE

## 2024-12-22 PROCEDURE — 36415 COLL VENOUS BLD VENIPUNCTURE: CPT

## 2024-12-22 PROCEDURE — 2580000003 HC RX 258: Performed by: FAMILY MEDICINE

## 2024-12-22 PROCEDURE — 94640 AIRWAY INHALATION TREATMENT: CPT

## 2024-12-22 PROCEDURE — 80202 ASSAY OF VANCOMYCIN: CPT

## 2024-12-22 PROCEDURE — 80048 BASIC METABOLIC PNL TOTAL CA: CPT

## 2024-12-22 PROCEDURE — 82962 GLUCOSE BLOOD TEST: CPT

## 2024-12-22 PROCEDURE — 1100000000 HC RM PRIVATE

## 2024-12-22 RX ADMIN — ENOXAPARIN SODIUM 30 MG: 100 INJECTION SUBCUTANEOUS at 21:30

## 2024-12-22 RX ADMIN — PREGABALIN 75 MG: 75 CAPSULE ORAL at 09:05

## 2024-12-22 RX ADMIN — SODIUM CHLORIDE 25 ML: 9 INJECTION, SOLUTION INTRAVENOUS at 14:55

## 2024-12-22 RX ADMIN — SODIUM CHLORIDE, PRESERVATIVE FREE 10 ML: 5 INJECTION INTRAVENOUS at 09:06

## 2024-12-22 RX ADMIN — MICONAZOLE NITRATE: 2 POWDER TOPICAL at 09:09

## 2024-12-22 RX ADMIN — VANCOMYCIN HYDROCHLORIDE 750 MG: 750 INJECTION, POWDER, LYOPHILIZED, FOR SOLUTION INTRAVENOUS at 14:58

## 2024-12-22 RX ADMIN — PANTOPRAZOLE SODIUM 40 MG: 40 TABLET, DELAYED RELEASE ORAL at 06:40

## 2024-12-22 RX ADMIN — BUMETANIDE 1 MG: 1 TABLET ORAL at 09:04

## 2024-12-22 RX ADMIN — INSULIN LISPRO 2 UNITS: 100 INJECTION, SOLUTION INTRAVENOUS; SUBCUTANEOUS at 17:56

## 2024-12-22 RX ADMIN — QUETIAPINE FUMARATE 25 MG: 25 TABLET ORAL at 21:30

## 2024-12-22 RX ADMIN — IPRATROPIUM BROMIDE 0.5 MG: 0.5 SOLUTION RESPIRATORY (INHALATION) at 07:30

## 2024-12-22 RX ADMIN — CARVEDILOL 25 MG: 12.5 TABLET, FILM COATED ORAL at 09:03

## 2024-12-22 RX ADMIN — AMLODIPINE BESYLATE 5 MG: 5 TABLET ORAL at 09:04

## 2024-12-22 RX ADMIN — ACETAMINOPHEN 650 MG: 325 TABLET ORAL at 19:26

## 2024-12-22 RX ADMIN — ENOXAPARIN SODIUM 30 MG: 100 INJECTION SUBCUTANEOUS at 09:03

## 2024-12-22 RX ADMIN — ARFORMOTEROL TARTRATE 15 MCG: 15 SOLUTION RESPIRATORY (INHALATION) at 07:30

## 2024-12-22 RX ADMIN — BUDESONIDE 250 MCG: 0.25 INHALANT RESPIRATORY (INHALATION) at 07:30

## 2024-12-22 RX ADMIN — INSULIN LISPRO 2 UNITS: 100 INJECTION, SOLUTION INTRAVENOUS; SUBCUTANEOUS at 21:37

## 2024-12-22 RX ADMIN — ASPIRIN 81 MG CHEWABLE TABLET 81 MG: 81 TABLET CHEWABLE at 09:03

## 2024-12-22 RX ADMIN — MICONAZOLE NITRATE: 2 POWDER TOPICAL at 21:30

## 2024-12-22 RX ADMIN — INSULIN LISPRO 2 UNITS: 100 INJECTION, SOLUTION INTRAVENOUS; SUBCUTANEOUS at 12:35

## 2024-12-22 RX ADMIN — BUMETANIDE 1 MG: 1 TABLET ORAL at 17:53

## 2024-12-22 RX ADMIN — CARVEDILOL 25 MG: 12.5 TABLET, FILM COATED ORAL at 17:53

## 2024-12-22 RX ADMIN — SODIUM CHLORIDE, PRESERVATIVE FREE 10 ML: 5 INJECTION INTRAVENOUS at 21:30

## 2024-12-22 RX ADMIN — FERROUS SULFATE TAB 325 MG (65 MG ELEMENTAL FE) 325 MG: 325 (65 FE) TAB at 09:03

## 2024-12-22 ASSESSMENT — PAIN DESCRIPTION - LOCATION: LOCATION: FOOT

## 2024-12-22 ASSESSMENT — PAIN SCALES - GENERAL: PAINLEVEL_OUTOF10: 8

## 2024-12-22 ASSESSMENT — PAIN DESCRIPTION - ORIENTATION: ORIENTATION: LEFT

## 2024-12-22 ASSESSMENT — PAIN DESCRIPTION - DESCRIPTORS: DESCRIPTORS: ACHING

## 2024-12-22 NOTE — PLAN OF CARE
Problem: Chronic Conditions and Co-morbidities  Goal: Patient's chronic conditions and co-morbidity symptoms are monitored and maintained or improved  Outcome: Progressing  Flowsheets (Taken 12/21/2024 8716)  Care Plan - Patient's Chronic Conditions and Co-Morbidity Symptoms are Monitored and Maintained or Improved: Monitor and assess patient's chronic conditions and comorbid symptoms for stability, deterioration, or improvement     Problem: Safety - Adult  Goal: Free from fall injury  Outcome: Progressing     Problem: ABCDS Injury Assessment  Goal: Absence of physical injury  Outcome: Progressing

## 2024-12-22 NOTE — PROGRESS NOTES
Day #6 of Vancomycin - Level/Dosing Update  Indication:  MRSA bacteremia   - LLE hardware infection and osteomyelitis   - Concern for PPM involvement, TTE pending  Current regimen:  1000 mg IV Q24H  Abx regimen:  Monotherapy  ID Following ?: YES  Concomitant nephrotoxic drugs (requires more frequent monitoring): Contrast , Loop diuretics  Frequency of BMP?: Daily through     Recent Labs     24  0553 24  0555 24  0631   WBC 4.5 4.2  --    CREATININE 1.57* 1.49* 1.37*   BUN 28* 26* 25*     Est CrCl: ~45 ml/min; UO: 0.4 ml/kg/hr  Temp (24hrs), Av.9 °F (36.6 °C), Min:97.5 °F (36.4 °C), Max:98.1 °F (36.7 °C)    Cultures:    Blood (paired): MRSA in 3/4 bottle (vanc LILIANA=1), final            BCID2: Staph aureus, mecA/C & mrej detected   Urine: >100k ESBL+ E coli, final   Blood (paired): NGTD    MRSA Swab ordered (if applicable)? N/A    Goal target range AUC/LILIANA 400-600    Recent level history:  Date/Time Dose & Interval Measured Level (mcg/mL) Associated AUC/LILIANA Dose Adjustment     2500 mg LD x 1 13.7 ~ 27 hrs post load  1000 mg Q24H    @ 0631 1000 mg IV Q24H 20.7 (~17 hrs post-dose) 560 Change to 750 mg IV Q24H                                  : No need to treat ESBL per ID, await ortho consult and TTE.     Plan: The random vancomycin level drawn this morning correlates with an AUC/LILIANA of 560, which is at the upper end of the goal range goal range.  Plan will be to adjust the dose to 750 mg IV Q24H for an estimated AUC/LILIANA of 479.  Pharmacy will continue to monitor patient daily and will make dosage adjustments based upon changing renal function.    *Random vancomycin levels are used to calculate AUC/LILIANA, this level should not be interpreted as a trough. Vancomycin has been dosed using Bayesian kinetics software to target an AUC24:LILIANA of 400-600, which provides adequate exposure for as assumed infection due to MRSA with an LILIANA of 1 or less while reducing the

## 2024-12-22 NOTE — PROGRESS NOTES
[facility]                                                                                          Hospitalist Progress Note  Destiny Regan PA-C  Answering service: [phone] OR [phone] from in house phone        Date of Service:  2024  NAME:  Marilee Oakes  :  1950  MRN:  [id number]       Admission Summary:   Marilee Oakes is a 74 y.o. female who initially presented to [facility] ER on 2024 with chills, body ache and cough.  Patient reported that her symptoms have been ongoing for about 3 days prior to arrival.  Patient presented to the emergency room with stable vital signs.  Abnormal labs include BUN of 22 creatinine of 1.8 which is not too far from baseline.  CRP elevated at 10.10.  Also noted elevated alk phos of 195.  Also hemoglobin stable at baseline around 11.  Patient with known history of left foot wound which she has been followed by wound care.  Patient with recent admission from 10/21 to 10/30 for acute osteomyelitis of the left foot and completed course with doxycycline.  Workup in the ER with left ankle x-ray shows chronic appearing changes in the distal tibia-fibula and ankle, no new cortical bone destruction, however given chronic deformities, osteomyelitis was difficult to exclude.  In the ER, blood cultures were obtained, patient was started on cefepime in the ER and hospitalist service requested admit the patient for further evaluation management.   Interval history / Subjective:   Upon seeing the patient on rounds, patient was sitting up in her recliner at bedside. Patient cheerful and excited to see me. Daughter at bedside. Patient states she feels well, denies any pain. States \"everything is manageable.\" Patient confirms that amputation was recommended but would like to get a second opinion. Patient has JUAN MANUEL scheduled for tomorrow and hopeful she can go home then or the next day. Discussed potential options with patient and  daughter. Patient has no further questions or complaints at this time.     Assessment & Plan:      Left foot wound  -Chronic left foot wound, x-ray shows chronic appearing changes in the distal tibia and fibula as well as ankle, osteomyelitis is difficult to exclude, noted elevated CRP  -Follow-up triple phase bone scan positive for osteomyelitis  -ID following: Continue with IV vancomycin  -Orthopedic recommended amputation, and recommended another opinion from [facility] as patient was interested in other procedures that may salvage the limb. Ortho not able to offer any other alternatives other than suppressive treatments with antibiotics and walking in a boot, versus amputation. She has other medical issues that compromise another surgery, especially one that would require open incisions which are also prone for not healing and getting further infection. Patient considering other alternatives, considering follow-up with podiatry as outpatient and instillation of antibiotic beads     Bacteremia  -Initial blood cultures (12/16) growing MRSA 3 of 4 bottles  -Repeat blood cultures (12/18): No growth x 3 days  -JUAN MANUEL scheduled Monday (12/23) will follow to see if ICD infected at this point and/or endocarditis   -ID following, see above.     Urinary tract infection  -Urinalysis shows moderate blood, large leukocyte esterase, pyuria and bacteriuria  -Urine culture positive for ESBL, monitoring off abx at this time due to asx.   -ID following, see above.     Elevated troponin  -Likely due to infectious process  -ACS ruled out by cardiology     Hypertension  -Continue amlodipine and Coreg  -Monitor blood pressure     Elevated alk phos  -Likely source from the bones, noted cortical destruction on the left foot x-ray  -Continue to monitor     Asthma (Stable without Exacerbation)  -Continue home bronchodilators  -Breathing treatment as needed  -Pulmonology evaluated: Will need evaluation with speech therapist following discharge, no

## 2024-12-23 ENCOUNTER — ANESTHESIA EVENT (OUTPATIENT)
Facility: HOSPITAL | Age: 74
DRG: 638 | End: 2024-12-23
Payer: MEDICARE

## 2024-12-23 ENCOUNTER — ANESTHESIA (OUTPATIENT)
Facility: HOSPITAL | Age: 74
DRG: 638 | End: 2024-12-23
Payer: MEDICARE

## 2024-12-23 ENCOUNTER — APPOINTMENT (OUTPATIENT)
Facility: HOSPITAL | Age: 74
DRG: 638 | End: 2024-12-23
Attending: INTERNAL MEDICINE
Payer: MEDICARE

## 2024-12-23 LAB
ANION GAP SERPL CALC-SCNC: 2 MMOL/L (ref 2–12)
BACTERIA SPEC CULT: NORMAL
BACTERIA SPEC CULT: NORMAL
BUN SERPL-MCNC: 20 MG/DL (ref 6–20)
BUN/CREAT SERPL: 13 (ref 12–20)
CALCIUM SERPL-MCNC: 8.3 MG/DL (ref 8.5–10.1)
CHLORIDE SERPL-SCNC: 110 MMOL/L (ref 97–108)
CO2 SERPL-SCNC: 32 MMOL/L (ref 21–32)
CREAT SERPL-MCNC: 1.51 MG/DL (ref 0.55–1.02)
ECHO BSA: 2.19 M2
ECHO LV EF PHYSICIAN: 60 %
ECHO TV REGURGITANT MAX VELOCITY: 3.54 M/S
ECHO TV REGURGITANT PEAK GRADIENT: 50 MMHG
GLUCOSE BLD STRIP.AUTO-MCNC: 129 MG/DL (ref 65–117)
GLUCOSE BLD STRIP.AUTO-MCNC: 133 MG/DL (ref 65–117)
GLUCOSE BLD STRIP.AUTO-MCNC: 147 MG/DL (ref 65–117)
GLUCOSE BLD STRIP.AUTO-MCNC: 210 MG/DL (ref 65–117)
GLUCOSE SERPL-MCNC: 129 MG/DL (ref 65–100)
POTASSIUM SERPL-SCNC: 3.3 MMOL/L (ref 3.5–5.1)
SERVICE CMNT-IMP: ABNORMAL
SERVICE CMNT-IMP: NORMAL
SERVICE CMNT-IMP: NORMAL
SODIUM SERPL-SCNC: 144 MMOL/L (ref 136–145)

## 2024-12-23 PROCEDURE — 1100000000 HC RM PRIVATE

## 2024-12-23 PROCEDURE — 3700000000 HC ANESTHESIA ATTENDED CARE

## 2024-12-23 PROCEDURE — 2580000003 HC RX 258: Performed by: FAMILY MEDICINE

## 2024-12-23 PROCEDURE — 2500000003 HC RX 250 WO HCPCS

## 2024-12-23 PROCEDURE — 99233 SBSQ HOSP IP/OBS HIGH 50: CPT | Performed by: INTERNAL MEDICINE

## 2024-12-23 PROCEDURE — 93312 ECHO TRANSESOPHAGEAL: CPT

## 2024-12-23 PROCEDURE — 94640 AIRWAY INHALATION TREATMENT: CPT

## 2024-12-23 PROCEDURE — 97535 SELF CARE MNGMENT TRAINING: CPT

## 2024-12-23 PROCEDURE — 6360000002 HC RX W HCPCS: Performed by: ANESTHESIOLOGY

## 2024-12-23 PROCEDURE — 6360000002 HC RX W HCPCS: Performed by: FAMILY MEDICINE

## 2024-12-23 PROCEDURE — B24BZZ4 ULTRASONOGRAPHY OF HEART WITH AORTA, TRANSESOPHAGEAL: ICD-10-PCS | Performed by: INTERNAL MEDICINE

## 2024-12-23 PROCEDURE — 6360000002 HC RX W HCPCS: Performed by: INTERNAL MEDICINE

## 2024-12-23 PROCEDURE — 2700000000 HC OXYGEN THERAPY PER DAY

## 2024-12-23 PROCEDURE — 2580000003 HC RX 258: Performed by: INTERNAL MEDICINE

## 2024-12-23 PROCEDURE — 3700000001 HC ADD 15 MINUTES (ANESTHESIA)

## 2024-12-23 PROCEDURE — 97116 GAIT TRAINING THERAPY: CPT

## 2024-12-23 PROCEDURE — 6370000000 HC RX 637 (ALT 250 FOR IP): Performed by: FAMILY MEDICINE

## 2024-12-23 PROCEDURE — 80048 BASIC METABOLIC PNL TOTAL CA: CPT

## 2024-12-23 PROCEDURE — 97530 THERAPEUTIC ACTIVITIES: CPT

## 2024-12-23 PROCEDURE — 2500000003 HC RX 250 WO HCPCS: Performed by: FAMILY MEDICINE

## 2024-12-23 PROCEDURE — 6370000000 HC RX 637 (ALT 250 FOR IP)

## 2024-12-23 PROCEDURE — 82962 GLUCOSE BLOOD TEST: CPT

## 2024-12-23 RX ORDER — PROPOFOL 10 MG/ML
INJECTION, EMULSION INTRAVENOUS
Status: COMPLETED
Start: 2024-12-23 | End: 2024-12-23

## 2024-12-23 RX ORDER — LIDOCAINE HYDROCHLORIDE 20 MG/ML
INJECTION, SOLUTION EPIDURAL; INFILTRATION; INTRACAUDAL; PERINEURAL
Status: COMPLETED
Start: 2024-12-23 | End: 2024-12-23

## 2024-12-23 RX ORDER — LIDOCAINE HYDROCHLORIDE 20 MG/ML
INJECTION, SOLUTION EPIDURAL; INFILTRATION; INTRACAUDAL; PERINEURAL
Status: DISPENSED
Start: 2024-12-23 | End: 2024-12-24

## 2024-12-23 RX ORDER — SODIUM CHLORIDE 9 MG/ML
INJECTION, SOLUTION INTRAVENOUS PRN
Status: DISCONTINUED | OUTPATIENT
Start: 2024-12-23 | End: 2024-12-24 | Stop reason: HOSPADM

## 2024-12-23 RX ORDER — TRAMADOL HYDROCHLORIDE 50 MG/1
25 TABLET ORAL EVERY 6 HOURS PRN
Status: DISCONTINUED | OUTPATIENT
Start: 2024-12-23 | End: 2024-12-24 | Stop reason: HOSPADM

## 2024-12-23 RX ORDER — SODIUM CHLORIDE 0.9 % (FLUSH) 0.9 %
5-40 SYRINGE (ML) INJECTION PRN
Status: DISCONTINUED | OUTPATIENT
Start: 2024-12-23 | End: 2024-12-24 | Stop reason: HOSPADM

## 2024-12-23 RX ORDER — SODIUM CHLORIDE 0.9 % (FLUSH) 0.9 %
5-40 SYRINGE (ML) INJECTION EVERY 12 HOURS SCHEDULED
Status: DISCONTINUED | OUTPATIENT
Start: 2024-12-23 | End: 2024-12-24 | Stop reason: HOSPADM

## 2024-12-23 RX ORDER — PHENYLEPHRINE HCL IN 0.9% NACL 0.4MG/10ML
SYRINGE (ML) INTRAVENOUS
Status: DISPENSED
Start: 2024-12-23 | End: 2024-12-23

## 2024-12-23 RX ORDER — PHENYLEPHRINE HCL IN 0.9% NACL 0.4MG/10ML
SYRINGE (ML) INTRAVENOUS
Status: DISCONTINUED | OUTPATIENT
Start: 2024-12-23 | End: 2024-12-23 | Stop reason: SDUPTHER

## 2024-12-23 RX ORDER — PHENYLEPHRINE HCL IN 0.9% NACL 0.4MG/10ML
SYRINGE (ML) INTRAVENOUS
Status: DISPENSED
Start: 2024-12-23 | End: 2024-12-24

## 2024-12-23 RX ORDER — LIDOCAINE HYDROCHLORIDE 20 MG/ML
INJECTION, SOLUTION EPIDURAL; INFILTRATION; INTRACAUDAL; PERINEURAL
Status: DISCONTINUED | OUTPATIENT
Start: 2024-12-23 | End: 2024-12-23 | Stop reason: SDUPTHER

## 2024-12-23 RX ADMIN — VANCOMYCIN HYDROCHLORIDE 750 MG: 750 INJECTION, POWDER, LYOPHILIZED, FOR SOLUTION INTRAVENOUS at 12:16

## 2024-12-23 RX ADMIN — PANTOPRAZOLE SODIUM 40 MG: 40 TABLET, DELAYED RELEASE ORAL at 06:43

## 2024-12-23 RX ADMIN — PROPOFOL 30 MG: 10 INJECTION, EMULSION INTRAVENOUS at 09:35

## 2024-12-23 RX ADMIN — TRAMADOL HYDROCHLORIDE 25 MG: 50 TABLET, COATED ORAL at 12:22

## 2024-12-23 RX ADMIN — BUMETANIDE 1 MG: 1 TABLET ORAL at 10:52

## 2024-12-23 RX ADMIN — ENOXAPARIN SODIUM 30 MG: 100 INJECTION SUBCUTANEOUS at 10:53

## 2024-12-23 RX ADMIN — PROPOFOL 60 MG: 10 INJECTION, EMULSION INTRAVENOUS at 09:32

## 2024-12-23 RX ADMIN — MICONAZOLE NITRATE: 2 POWDER TOPICAL at 11:00

## 2024-12-23 RX ADMIN — INSULIN LISPRO 2 UNITS: 100 INJECTION, SOLUTION INTRAVENOUS; SUBCUTANEOUS at 21:00

## 2024-12-23 RX ADMIN — ARFORMOTEROL TARTRATE 15 MCG: 15 SOLUTION RESPIRATORY (INHALATION) at 20:02

## 2024-12-23 RX ADMIN — SODIUM CHLORIDE, PRESERVATIVE FREE 10 ML: 5 INJECTION INTRAVENOUS at 21:00

## 2024-12-23 RX ADMIN — Medication 80 MCG: at 09:40

## 2024-12-23 RX ADMIN — Medication 80 MCG: at 09:38

## 2024-12-23 RX ADMIN — AMLODIPINE BESYLATE 5 MG: 5 TABLET ORAL at 10:53

## 2024-12-23 RX ADMIN — SODIUM CHLORIDE, PRESERVATIVE FREE 10 ML: 5 INJECTION INTRAVENOUS at 11:00

## 2024-12-23 RX ADMIN — LIDOCAINE HYDROCHLORIDE 100 MG: 20 INJECTION, SOLUTION EPIDURAL; INFILTRATION; INTRACAUDAL; PERINEURAL at 09:32

## 2024-12-23 RX ADMIN — SODIUM CHLORIDE, PRESERVATIVE FREE 10 ML: 5 INJECTION INTRAVENOUS at 20:57

## 2024-12-23 RX ADMIN — Medication 120 MCG: at 09:32

## 2024-12-23 RX ADMIN — BUDESONIDE 250 MCG: 0.25 INHALANT RESPIRATORY (INHALATION) at 20:02

## 2024-12-23 RX ADMIN — QUETIAPINE FUMARATE 25 MG: 25 TABLET ORAL at 20:46

## 2024-12-23 RX ADMIN — ASPIRIN 81 MG CHEWABLE TABLET 81 MG: 81 TABLET CHEWABLE at 10:52

## 2024-12-23 RX ADMIN — PREGABALIN 75 MG: 75 CAPSULE ORAL at 10:53

## 2024-12-23 RX ADMIN — ENOXAPARIN SODIUM 30 MG: 100 INJECTION SUBCUTANEOUS at 20:46

## 2024-12-23 RX ADMIN — CEFTAROLINE FOSAMIL 400 MG: 400 POWDER, FOR SOLUTION INTRAVENOUS at 18:14

## 2024-12-23 RX ADMIN — SODIUM CHLORIDE, PRESERVATIVE FREE 10 ML: 5 INJECTION INTRAVENOUS at 20:46

## 2024-12-23 RX ADMIN — CARVEDILOL 25 MG: 12.5 TABLET, FILM COATED ORAL at 11:00

## 2024-12-23 RX ADMIN — TRAMADOL HYDROCHLORIDE 25 MG: 50 TABLET, COATED ORAL at 20:50

## 2024-12-23 RX ADMIN — FERROUS SULFATE TAB 325 MG (65 MG ELEMENTAL FE) 325 MG: 325 (65 FE) TAB at 10:59

## 2024-12-23 RX ADMIN — PROPOFOL 30 MG: 10 INJECTION, EMULSION INTRAVENOUS at 09:38

## 2024-12-23 RX ADMIN — BUMETANIDE 1 MG: 1 TABLET ORAL at 17:14

## 2024-12-23 RX ADMIN — IPRATROPIUM BROMIDE 0.5 MG: 0.5 SOLUTION RESPIRATORY (INHALATION) at 20:02

## 2024-12-23 RX ADMIN — CARVEDILOL 25 MG: 12.5 TABLET, FILM COATED ORAL at 17:13

## 2024-12-23 RX ADMIN — Medication 80 MCG: at 09:35

## 2024-12-23 RX ADMIN — ACETAMINOPHEN 650 MG: 325 TABLET ORAL at 10:52

## 2024-12-23 RX ADMIN — MICONAZOLE NITRATE: 2 POWDER TOPICAL at 20:46

## 2024-12-23 ASSESSMENT — PAIN SCALES - GENERAL
PAINLEVEL_OUTOF10: 4
PAINLEVEL_OUTOF10: 7
PAINLEVEL_OUTOF10: 8

## 2024-12-23 ASSESSMENT — PAIN - FUNCTIONAL ASSESSMENT: PAIN_FUNCTIONAL_ASSESSMENT: PREVENTS OR INTERFERES SOME ACTIVE ACTIVITIES AND ADLS

## 2024-12-23 ASSESSMENT — PAIN DESCRIPTION - ORIENTATION
ORIENTATION: LEFT

## 2024-12-23 ASSESSMENT — COPD QUESTIONNAIRES: CAT_SEVERITY: MODERATE

## 2024-12-23 ASSESSMENT — PAIN DESCRIPTION - DESCRIPTORS
DESCRIPTORS: ACHING

## 2024-12-23 ASSESSMENT — PAIN DESCRIPTION - LOCATION
LOCATION: FOOT
LOCATION: FOOT
LOCATION: LEG
LOCATION: FOOT;ANKLE
LOCATION: FOOT

## 2024-12-23 ASSESSMENT — PAIN DESCRIPTION - PAIN TYPE: TYPE: CHRONIC PAIN

## 2024-12-23 NOTE — PROGRESS NOTES
TRANSFER - OUT REPORT:    Verbal report given to AGNES Alarcon(name) on Marilee Oakes being transferred to [facility](unit) for routine progression of patient care       Report consisted of patient's Situation, Background, Assessment and   Recommendations(SBAR).     Information from the following report(s) Nurse Handoff Report, MAR, and Cardiac Rhythm ventricular paced  was reviewed with the receiving nurse.    Opportunity for questions and clarification was provided.      Patient transported with:   Monitor  O2 @ 2 liters

## 2024-12-23 NOTE — PLAN OF CARE
Problem: Chronic Conditions and Co-morbidities  Goal: Patient's chronic conditions and co-morbidity symptoms are monitored and maintained or improved  Outcome: Progressing  Flowsheets (Taken 12/22/2024 0900)  Care Plan - Patient's Chronic Conditions and Co-Morbidity Symptoms are Monitored and Maintained or Improved: Monitor and assess patient's chronic conditions and comorbid symptoms for stability, deterioration, or improvement     Problem: Safety - Adult  Goal: Free from fall injury  Outcome: Progressing     Problem: ABCDS Injury Assessment  Goal: Absence of physical injury  Outcome: Progressing

## 2024-12-23 NOTE — PROGRESS NOTES
TRANSFER - IN REPORT:    Verbal report received from AGNES Alarcon(name) on Marilee Oakes  being received from [facility]71(unit) for ordered procedure      Report consisted of patient’s Situation, Background, Assessment and   Recommendations(SBAR).     Information from the following report(s) Nurse Handoff Report, MAR, and Cardiac Rhythm Ventricular pacemaker  was reviewed with the receiving nurse.    Opportunity for questions and clarification was provided.      Assessment completed upon patient’s arrival to unit and care assume

## 2024-12-23 NOTE — CARE COORDINATION
Transition of Care Plan  RUR 36%      Plan   Home with IV abx therapy and Home Health     [facility]   [phone] to provide IV abx until 12/31/24  Contact  --Sintia  [phone]   Sintia to come to [facility] 12/24/24 in am   to teach   Copay-- $1571.25 for the week-- family aware  Will make arrangements with [facility] [phone]    CM to fax attach PICC Line Report to referrals to [facility] and [facility] when available    Transportation  - BLS or  (ramp entrance to home)   said he would like to transport patient home if it is safe    Will be provided with 2nd Medicare letter prior to discharge    Contact   Wesley Oakes [phone]        Prior Level of Functioning: home with HH and 's assistance-as needed-    Needs assistance with mobility and housework.    Disposition: Home with HH IV abx   ASHANTI: 12/24  If SNF or IPR: Date FOC offered:   Date FOC received:   Accepting facility:   Date authorization started with reference number:   Date authorization received and expires:   Follow up appointments:   DME needed: none-- Has ramp, w/c, hospital bed rollator   Transportation at discharge: family or BLS  IM/IMM Medicare/ letter given: y  Is patient a  and connected with [facility]?               If yes, was  transfer form completed and [facility] notified?   Caregiver Contact: roverto Oakes [phone]   Discharge Caregiver contacted prior to discharge?   Care Conference needed?   Barriers to discharge:  PICC line and IV abx set up    CM met with patient several times today about transition of care planing.     Patient in agreement with home with  and IV abx therapy.  After final IV abx orders received, patient expressed to ID physician that she was not comfortable with the IV abx due to the side effects..   He changed the order and patient will have Doxycycline 100 mg PO BID through 12/31/24..   is comfortable in assisting in

## 2024-12-23 NOTE — PLAN OF CARE
INFECTIOUS DISEASE discharge plan:    Diagnosis: MRSA Bacteremia, LLE osteomyelitis    Antibiotic: Vancomycin 750mg IV q24hrs, duration until 12/31/24, inclusive.  Doxycycline 100mg PO BID     PICC line insertion for outpatient antibiotic.     Routine PICC Care including PRN catheter flow management    Weekly labs with results fax to # [phone]. Call critical lab values to [phone].   [x] CBC w/diff  [x] BUN/Creatinine  [] AST, ALT  [] CPK  [x] CRP    Home Health to pull PICC line after last dose or send to IR for Shane removal    May send to IR for line evaluation or replacement PRN Phone # [phone]    Follow up with Dr. Cornelius, Orthopedics outpatient.

## 2024-12-23 NOTE — PLAN OF CARE
Problem: Chronic Conditions and Co-morbidities  Goal: Patient's chronic conditions and co-morbidity symptoms are monitored and maintained or improved  12/23/2024 1027 by Agnes Pina, RN  Outcome: Progressing  Flowsheets (Taken 12/23/2024 0800)  Care Plan - Patient's Chronic Conditions and Co-Morbidity Symptoms are Monitored and Maintained or Improved: Monitor and assess patient's chronic conditions and comorbid symptoms for stability, deterioration, or improvement  12/22/2024 2154 by Ella Alarcon, RN  Outcome: Progressing  Flowsheets (Taken 12/22/2024 0910)  Care Plan - Patient's Chronic Conditions and Co-Morbidity Symptoms are Monitored and Maintained or Improved: Monitor and assess patient's chronic conditions and comorbid symptoms for stability, deterioration, or improvement

## 2024-12-23 NOTE — PROGRESS NOTES
[facility]  Hospitalist Group                                                                                          Hospitalist Progress Note  Destiny Regan PA-C  Answering service: [phone] OR [phone] from in house phone        Date of Service:  2024  NAME:  Marilee Oakes  :  1950  MRN:  [id number]       Admission Summary:   Marilee Oakes is a 74 y.o. female who initially presented to [facility] ER on 2024 with chills, body ache and cough.  Patient reported that her symptoms have been ongoing for about 3 days prior to arrival.  Patient presented to the emergency room with stable vital signs.  Abnormal labs include BUN of 22 creatinine of 1.8 which is not too far from baseline.  CRP elevated at 10.10.  Also noted elevated alk phos of 195.  Also hemoglobin stable at baseline around 11.  Patient with known history of left foot wound which she has been followed by wound care.  Patient with recent admission from 10/21 to 10/30 for acute osteomyelitis of the left foot and completed course with doxycycline.  Workup in the ER with left ankle x-ray shows chronic appearing changes in the distal tibia-fibula and ankle, no new cortical bone destruction, however given chronic deformities, osteomyelitis was difficult to exclude.  In the ER, blood cultures were obtained, patient was started on cefepime in the ER and hospitalist service requested admit the patient for further evaluation management.   Interval history / Subjective:   Upon seeing the patient on rounds, patient was sitting up in bed. Patient states her JUAN MANUEL went well and no vegetations were seen. Patient confirms again that amputation was recommended but would like to get a second opinion. Patient aware she will need long term IV abx and PICC upon discharge. Patient has no further questions or complaints at this time.     Assessment & Plan:      Left foot wound  -Chronic left foot wound, x-ray shows chronic appearing

## 2024-12-23 NOTE — PROGRESS NOTES
PCCM:    VSS, 2lpm     Cr 1.51    Plan:    Asthma   Follow up in 1-2 wks with Dr Moreira   We will be available again to see if needed    Candido Oakes PA-C

## 2024-12-23 NOTE — PROGRESS NOTES
Occupational Therapy  12/23/24    Chart reviewed in prep for OT. Pt is currently off the floor for a procedure. Will defer and follow up as able and appropriate.    Thank you!  Tia Gordon, OT

## 2024-12-23 NOTE — PROGRESS NOTES
PS message to Dr Cornelius: order for PICC line reviewed. Patient has CKD3, received short term dialysis in May 2024. Not being followed by nephrology this admission. Still proceed with PICC or tunnelled line by IR? Please advise.

## 2024-12-23 NOTE — PLAN OF CARE
Problem: Physical Therapy - Adult  Goal: By Discharge: Performs mobility at highest level of function for planned discharge setting.  See evaluation for individualized goals.  Description: FUNCTIONAL STATUS PRIOR TO ADMISSION: Patient was modified independent using a rollator for functional mobility within the home. Patient utilized a manual wheelchair when out of the home with her  assisting. Had been receiving HH PT/OT/nursing. Per patient, she had been WBAT in boot on LLE.    HOME SUPPORT PRIOR TO ADMISSION: The patient lived with her  and required moderate assistance for mobility/ADL's on \"bad days\" but otherwise was independent.    Physical Therapy Goals  Initiated 12/19/2024  1.  Patient will move from supine to sit and sit to supine, scoot up and down, and roll side to side in bed with modified independence within 7 day(s).    2.  Patient will perform sit to stand with minimal assistance within 7 day(s).  3.  Patient will transfer from bed to chair and chair to bed with minimal assistance using the least restrictive device within 7 day(s).    Outcome: Progressing   PHYSICAL THERAPY TREATMENT    Patient: Marilee Oakes (74 y.o. female)  Date: 12/23/2024  Diagnosis: Osteomyelitis [M86.9] Osteomyelitis      Precautions: Fall Risk, Weight Bearing   Left Lower Extremity Weight Bearing: Weight Bearing As Tolerated (in CAM Boot only)           Required Braces or Orthoses  Left Lower Extremity Brace: Boot  LLE Brace Type: CAM boot      ASSESSMENT:  Patient continues to benefit from skilled PT services and is slowly progressing towards goals. Pt tolerates gait training in room with good weight bearing with boot, able to increase distance and navigate the room with cues. Pt highly resolved in other options than amputation, noted to be DC home with antibiotics in an attempt to salvage limb, educated on DC instructions. Pt and  appear willing and able to manage. Will continue to follow

## 2024-12-23 NOTE — PROCEDURES
PROCEDURE NOTE  Date: 12/23/2024   Name: Marilee Oakes  YOB: 1950    Procedures    Procedure:  TRANSESOPHAGEAL ECHO    Indication: Bacteremia    PRELIMINARY FINDINGS  Normal EF of 60-65%  Mild MR, no vegetations  DEMARIO sealed with Watchman (visually normal)  Functional bicuspid valve with fused raphe, trace to mild AI, no vegetations.  Mild- moderate TR, no vegetations.  Normal pulmonic valve, no vegetations.  Pacemaker leads (visualized portions) visually normal, no vegetations    IMPRESSION  No evidence of valvular vegetations / pacemaker lead vegetations    Full note to follow.

## 2024-12-23 NOTE — PROGRESS NOTES
Pt off the floor for a procedure. Will continue to follow and progress as able    Irma Hawkins, RICO, PT

## 2024-12-23 NOTE — PROGRESS NOTES
Infectious Disease Progress     Impression:   High Grade MRSA Bacteremia  OM of left foot  LLE hardware infection  Concerned with PPM infection  - afebrile, wbc 4.1     Blood cx (12/16) MRSA, (12/18) no growth so far    HILARY on CKD  CAD  A-fib, s/p ablation  CHF  Elevated troponin  - primary team following    Plan:     - Changed Vancomycin to Ceftaroline - patient refuses Vancomycin at home    - Duration until 12/31/24, inclusive.    - Will need afterwards Doxycycline 100mg PO BID LIFELONG unless undergoes LLE amputation extent per orthopedic/vascular assessment.    - Appreciate orthopedic and podiatry input for LLE for source control       History of Present Illness   12/18/2024  \"75 y/o female with CHF, COPD, A-fib s/p ppm and Watchman, CAD, DM2 seen previously for ORIF 3/2024 ORIF LLE with ankle fracture complicated by 4/2024 with drainage and MRSA bacteremia and non-union of fracture s/p I/D and hardware removal 4/2024 and prolonged IV antibiotic course.  Then on 6/25/2024 underwent LLE tibiotalar intramedullary nail fusion.  This was also unfortunately complicated by MRSA infection s/p hardware removal and application of bone cement due to osteomyelitis from hardware infection and treated with prolonged Doxycycline course as it appears that patient declined IV antibiotic therapy.  Course completed with PO Doxycycline with reportedly good adherence on 12/3/24.  Multiple prior JUAN MANUEL without evidence of PPM lead vegetation nor endocarditis.     Admitted to [facility] for 2 days myalgias and chills, malaise, fever, vomiting.  Workup with MRSA recurrent bacteremia.  Started on Vanco/Cefepime.  X-ray without osteomyelitis on LLE.  C/o SOB.\"       Subjective:      Afebrile, JUAN MANUEL performed today, tolerating PO diet    Review of Systems:            Symptom Y/N Comments   Symptom Y/N Comments   Fever/Chills  n     Chest Pain n       Poor Appetite       Edema y       Cough       Abdominal Pain n       Sputum       Joint Pain y      12/23/24 1100    budesonide (PULMICORT) nebulizer suspension 250 mcg  0.25 mg Nebulization BID RT Nika Pham MD   250 mcg at 12/22/24 0730    And    arformoterol tartrate (BROVANA) nebulizer solution 15 mcg  15 mcg Nebulization BID RT Nika Pham MD   15 mcg at 12/22/24 0730    And    ipratropium (ATROVENT) 0.02 % nebulizer solution 0.5 mg  0.5 mg Nebulization BID RT Nika Pham MD   0.5 mg at 12/22/24 0730    amLODIPine (NORVASC) tablet 5 mg  5 mg Oral Daily Destiny Regan PA-C   5 mg at 12/23/24 1053    carvedilol (COREG) tablet 25 mg  25 mg Oral BID with meals Destiny Regan PA-C   25 mg at 12/23/24 1100    insulin lispro (HUMALOG,ADMELOG) injection vial 0-8 Units  0-8 Units SubCUTAneous 4x Daily AC & HS Destiny Regan PA-C   2 Units at 12/22/24 2137    glucose chewable tablet 16 g  4 tablet Oral PRN Destiny Regan PA-C        dextrose bolus 10% 125 mL  125 mL IntraVENous PRN Destiny Regan PA-C        Or    dextrose bolus 10% 250 mL  250 mL IntraVENous PRN Destiny Regan PA-C        glucagon injection 1 mg  1 mg SubCUTAneous PRN Destiny Regan PA-C        dextrose 10 % infusion   IntraVENous Continuous PRN Destiny Regan PA-C        ferrous sulfate (IRON 325) tablet 325 mg  325 mg Oral Daily with breakfast Estuardo Johnson MD   325 mg at 12/23/24 1059    aspirin chewable tablet 81 mg  81 mg Oral Daily Estuardo Johnson MD   81 mg at 12/23/24 1052    pantoprazole (PROTONIX) tablet 40 mg  40 mg Oral QAM AC Estuardo Johnson MD   40 mg at 12/23/24 0643    pregabalin (LYRICA) capsule 75 mg  75 mg Oral Daily Estuardo Johnson MD   75 mg at 12/23/24 1053    QUEtiapine (SEROQUEL) tablet 25 mg  25 mg Oral QHS Estuardo Johnson MD   25 mg at 12/22/24 2130    sodium chloride flush 0.9 % injection 5-40 mL  5-40 mL IntraVENous 2 times per day Estuardo Johnson MD   10 mL at 12/23/24 1100    sodium chloride flush 0.9 % injection 5-40 mL  5-40 mL IntraVENous PRN Estuardo Johnson MD        0.9 % sodium chloride infusion

## 2024-12-23 NOTE — ANESTHESIA PRE PROCEDURE
Department of Anesthesiology  Preprocedure Note       Name:  Marilee Oakes   Age:  74 y.o.  :  1950                                          MRN:  [id number]         Date:  2024      Surgeon: * No surgeons listed *    Procedure: * No procedures listed *    Medications prior to admission:   Prior to Admission medications    Medication Sig Start Date End Date Taking? Authorizing Provider   amLODIPine (NORVASC) 5 MG tablet Take 1 tablet by mouth daily   Yes ProviderAngela MD   benzonatate (TESSALON) 100 MG capsule Take 1 capsule by mouth 3 times daily as needed for Cough   Yes Provider, MD Angela   nystatin (MYCOSTATIN) 054703 UNIT/GM cream Apply topically 2 times daily Apply topically 2 times daily.   Yes Provider, MD Angela   oxyBUTYnin (DITROPAN) 5 MG tablet Take 1 tablet by mouth 2 times daily   Yes Provider, MD Angela   potassium chloride (KLOR-CON M) 20 MEQ extended release tablet Take 1 tablet by mouth 2 times daily 24  Yes Darien Potter MD   QUEtiapine (SEROQUEL) 25 MG tablet Take 1 tablet by mouth nightly 24  Yes Darien Potter MD   bumetanide (BUMEX) 1 MG tablet Take 1 tablet by mouth daily 10/31/24  Yes Lazaro Nieves MD   omeprazole (PRILOSEC) 40 MG delayed release capsule Take 1 capsule by mouth daily 24 Yes Darien Potter MD   pregabalin (LYRICA) 75 MG capsule Take 1 capsule by mouth daily for 360 days. Max Daily Amount: 75 mg 24 Yes Darien Potter MD   carvedilol (COREG) 25 MG tablet Take 1 tablet by mouth with breakfast and with evening meal 24 Yes Darien Potter MD   isosorbide mononitrate (IMDUR) 60 MG extended release tablet Take 1 tablet by mouth daily 24 Yes Darien Potter MD   albuterol sulfate HFA (PROVENTIL;VENTOLIN;PROAIR) 108 (90 Base) MCG/ACT inhaler Inhale 1 puff into the lungs every 4 hours as needed for Shortness of Breath 24 Yes Darien Potter,

## 2024-12-23 NOTE — PLAN OF CARE
Problem: Occupational Therapy - Adult  Goal: By Discharge: Performs self-care activities at highest level of function for planned discharge setting.  See evaluation for individualized goals.  Description: FUNCTIONAL STATUS PRIOR TO ADMISSION:  Patient was Mod I-Min A for BADLs and w/c transfers (via RW vs slide board), lives with her  who assists PRN, working with HH therapies and RN. Hx of L foot wound with poor healing, wears a CAM boot with WBAT (not currently in hospital).     Occupational Therapy Goals:  Initiated 12/19/2024  1.  Patient will perform seated bathing with Minimal Assist & AE PRN within 7 day(s).  2.  Patient will perform lower body dressing with Minimal Assist & AE PRN within 7 day(s).  3.  Patient will perform [place] transfers [place] [facility] [place] Moderate Assist x2 & DME PRN within 7 day(s).  4.  Patient will perform all aspects of toileting with Moderate Assist & AE PRN within 7 day(s).  5.  Patient will participate in upper extremity therapeutic exercise/activities with Supervision for 5 minutes with rest breaks PRN within 7 day(s).    6.  Patient will utilize energy conservation techniques during functional activities with verbal cues within 7 day(s).    Outcome: Progressing   OCCUPATIONAL THERAPY TREATMENT  Patient: Marilee Oakes (74 y.o. female)  Date: 12/23/2024  Primary Diagnosis: Osteomyelitis [M86.9]       Precautions: Fall Risk, Weight Bearing   Left Lower Extremity Weight Bearing: Weight Bearing As Tolerated (in CAM Boot only)            Chart, occupational therapy assessment, plan of care, and goals were reviewed.    ASSESSMENT  Patient continues to benefit from skilled OT services and is progressing towards goals. Pt presented seated in bed with 2 L O2 via nasal cannula. Pt O2 sats stable on RA at rest and with activity, therefore supplemental O2 removed. Pt tolerated functional transfers well using RW SBA with increased time. Pt continues to demonstrate some difficulty with  Training;Energy Conservation;Fall Prevention Strategies;Family Education  Education Method: Demonstration;Verbal;Teach Back  Barriers to Learning: None  Education Outcome: Verbalized understanding;Demonstrated understanding;Continued education needed    Thank you for this referral.  Tia Gordon OT  Minutes: 23

## 2024-12-23 NOTE — ANESTHESIA POSTPROCEDURE EVALUATION
Department of Anesthesiology  Postprocedure Note    Patient: Marilee Oakes  MRN: [id number]  YOB: 1950  Date of evaluation: 12/23/2024    Procedure Summary       Date: 12/23/24 Room / Location: [facility] NON-INVASIVE CARDIOLOGY    Anesthesia Start: 0924 Anesthesia Stop: 0942    Procedure: JUAN MANUEL (PRN CONTRAST/BUBBLE/3D) Diagnosis: Bacteremia    Scheduled Providers: Lambert Sandoval MD; Edison Comer MD Responsible Provider: Edison Comer MD    Anesthesia Type: MAC ASA Status: 4            Anesthesia Type: MAC    Amol Phase I: Amol Score: 9    Amol Phase II:      Anesthesia Post Evaluation    Patient location during evaluation: PACU  Patient participation: complete - patient participated  Level of consciousness: awake  Pain score: 0  Airway patency: patent  Nausea & Vomiting: no nausea  Cardiovascular status: blood pressure returned to baseline and hemodynamically stable  Respiratory status: acceptable  Hydration status: stable  Pain management: adequate and satisfactory to patient    No notable events documented.

## 2024-12-24 ENCOUNTER — TELEPHONE (OUTPATIENT)
Facility: HOSPITAL | Age: 74
End: 2024-12-24

## 2024-12-24 VITALS
DIASTOLIC BLOOD PRESSURE: 81 MMHG | WEIGHT: 223 LBS | HEIGHT: 67 IN | TEMPERATURE: 98 F | RESPIRATION RATE: 20 BRPM | OXYGEN SATURATION: 95 % | SYSTOLIC BLOOD PRESSURE: 105 MMHG | HEART RATE: 80 BPM | BODY MASS INDEX: 35 KG/M2

## 2024-12-24 PROBLEM — R65.20 SEVERE SEPSIS (HCC): Status: RESOLVED | Noted: 2024-12-19 | Resolved: 2024-12-24

## 2024-12-24 PROBLEM — A41.9 SEVERE SEPSIS (HCC): Status: RESOLVED | Noted: 2024-12-19 | Resolved: 2024-12-24

## 2024-12-24 PROBLEM — R09.02 HYPOXIA: Status: RESOLVED | Noted: 2020-12-13 | Resolved: 2024-12-24

## 2024-12-24 LAB
ANION GAP SERPL CALC-SCNC: 5 MMOL/L (ref 2–12)
BASOPHILS # BLD: 0.1 K/UL (ref 0–0.1)
BASOPHILS NFR BLD: 1 % (ref 0–1)
BUN SERPL-MCNC: 19 MG/DL (ref 6–20)
BUN/CREAT SERPL: 13 (ref 12–20)
CALCIUM SERPL-MCNC: 9.1 MG/DL (ref 8.5–10.1)
CHLORIDE SERPL-SCNC: 108 MMOL/L (ref 97–108)
CO2 SERPL-SCNC: 31 MMOL/L (ref 21–32)
CREAT SERPL-MCNC: 1.42 MG/DL (ref 0.55–1.02)
DIFFERENTIAL METHOD BLD: ABNORMAL
EOSINOPHIL # BLD: 0.4 K/UL (ref 0–0.4)
EOSINOPHIL NFR BLD: 6 % (ref 0–7)
ERYTHROCYTE [DISTWIDTH] IN BLOOD BY AUTOMATED COUNT: 16.2 % (ref 11.5–14.5)
GLUCOSE BLD STRIP.AUTO-MCNC: 136 MG/DL (ref 65–117)
GLUCOSE BLD STRIP.AUTO-MCNC: 146 MG/DL (ref 65–117)
GLUCOSE SERPL-MCNC: 130 MG/DL (ref 65–100)
HCT VFR BLD AUTO: 34.3 % (ref 35–47)
HGB BLD-MCNC: 10.5 G/DL (ref 11.5–16)
IMM GRANULOCYTES # BLD AUTO: 0.1 K/UL (ref 0–0.04)
IMM GRANULOCYTES NFR BLD AUTO: 1 % (ref 0–0.5)
LYMPHOCYTES # BLD: 0.8 K/UL (ref 0.8–3.5)
LYMPHOCYTES NFR BLD: 12 % (ref 12–49)
MCH RBC QN AUTO: 27.1 PG (ref 26–34)
MCHC RBC AUTO-ENTMCNC: 30.6 G/DL (ref 30–36.5)
MCV RBC AUTO: 88.4 FL (ref 80–99)
MONOCYTES # BLD: 0.5 K/UL (ref 0–1)
MONOCYTES NFR BLD: 7 % (ref 5–13)
NEUTS SEG # BLD: 4.7 K/UL (ref 1.8–8)
NEUTS SEG NFR BLD: 73 % (ref 32–75)
NRBC # BLD: 0 K/UL (ref 0–0.01)
NRBC BLD-RTO: 0 PER 100 WBC
PLATELET # BLD AUTO: 273 K/UL (ref 150–400)
PMV BLD AUTO: 10.8 FL (ref 8.9–12.9)
POTASSIUM SERPL-SCNC: 3.1 MMOL/L (ref 3.5–5.1)
RBC # BLD AUTO: 3.88 M/UL (ref 3.8–5.2)
RBC MORPH BLD: ABNORMAL
SERVICE CMNT-IMP: ABNORMAL
SERVICE CMNT-IMP: ABNORMAL
SODIUM SERPL-SCNC: 144 MMOL/L (ref 136–145)
WBC # BLD AUTO: 6.6 K/UL (ref 3.6–11)

## 2024-12-24 PROCEDURE — 36415 COLL VENOUS BLD VENIPUNCTURE: CPT

## 2024-12-24 PROCEDURE — 2709999900 HC NON-CHARGEABLE SUPPLY

## 2024-12-24 PROCEDURE — 76937 US GUIDE VASCULAR ACCESS: CPT

## 2024-12-24 PROCEDURE — 6370000000 HC RX 637 (ALT 250 FOR IP)

## 2024-12-24 PROCEDURE — 6360000002 HC RX W HCPCS: Performed by: FAMILY MEDICINE

## 2024-12-24 PROCEDURE — 2500000003 HC RX 250 WO HCPCS

## 2024-12-24 PROCEDURE — 6360000002 HC RX W HCPCS

## 2024-12-24 PROCEDURE — 36410 VNPNXR 3YR/> PHY/QHP DX/THER: CPT

## 2024-12-24 PROCEDURE — 2580000003 HC RX 258: Performed by: INTERNAL MEDICINE

## 2024-12-24 PROCEDURE — 2580000003 HC RX 258

## 2024-12-24 PROCEDURE — 6360000002 HC RX W HCPCS: Performed by: INTERNAL MEDICINE

## 2024-12-24 PROCEDURE — 99232 SBSQ HOSP IP/OBS MODERATE 35: CPT | Performed by: INTERNAL MEDICINE

## 2024-12-24 PROCEDURE — 94640 AIRWAY INHALATION TREATMENT: CPT

## 2024-12-24 PROCEDURE — C1751 CATH, INF, PER/CENT/MIDLINE: HCPCS

## 2024-12-24 PROCEDURE — 6370000000 HC RX 637 (ALT 250 FOR IP): Performed by: FAMILY MEDICINE

## 2024-12-24 PROCEDURE — 2500000003 HC RX 250 WO HCPCS: Performed by: FAMILY MEDICINE

## 2024-12-24 PROCEDURE — 82962 GLUCOSE BLOOD TEST: CPT

## 2024-12-24 PROCEDURE — 85025 COMPLETE CBC W/AUTO DIFF WBC: CPT

## 2024-12-24 PROCEDURE — 80048 BASIC METABOLIC PNL TOTAL CA: CPT

## 2024-12-24 RX ORDER — TRAMADOL HYDROCHLORIDE 50 MG/1
25 TABLET ORAL EVERY 8 HOURS PRN
Qty: 5 TABLET | Refills: 0 | Status: SHIPPED | OUTPATIENT
Start: 2024-12-24 | End: 2024-12-27

## 2024-12-24 RX ORDER — POTASSIUM CHLORIDE 750 MG/1
40 TABLET, EXTENDED RELEASE ORAL ONCE
Status: COMPLETED | OUTPATIENT
Start: 2024-12-24 | End: 2024-12-24

## 2024-12-24 RX ORDER — DOXYCYCLINE HYCLATE 100 MG
100 TABLET ORAL 2 TIMES DAILY
Qty: 60 TABLET | Refills: 0 | Status: SHIPPED | OUTPATIENT
Start: 2025-01-01 | End: 2025-01-31

## 2024-12-24 RX ADMIN — PREGABALIN 75 MG: 75 CAPSULE ORAL at 09:33

## 2024-12-24 RX ADMIN — ASPIRIN 81 MG CHEWABLE TABLET 81 MG: 81 TABLET CHEWABLE at 09:34

## 2024-12-24 RX ADMIN — FERROUS SULFATE TAB 325 MG (65 MG ELEMENTAL FE) 325 MG: 325 (65 FE) TAB at 09:34

## 2024-12-24 RX ADMIN — ACETAMINOPHEN 650 MG: 325 TABLET ORAL at 06:29

## 2024-12-24 RX ADMIN — MICONAZOLE NITRATE: 2 POWDER TOPICAL at 09:50

## 2024-12-24 RX ADMIN — SODIUM CHLORIDE, PRESERVATIVE FREE 10 ML: 5 INJECTION INTRAVENOUS at 09:47

## 2024-12-24 RX ADMIN — BUDESONIDE 250 MCG: 0.25 INHALANT RESPIRATORY (INHALATION) at 07:48

## 2024-12-24 RX ADMIN — POTASSIUM CHLORIDE 40 MEQ: 750 TABLET, FILM COATED, EXTENDED RELEASE ORAL at 13:42

## 2024-12-24 RX ADMIN — IPRATROPIUM BROMIDE 0.5 MG: 0.5 SOLUTION RESPIRATORY (INHALATION) at 07:48

## 2024-12-24 RX ADMIN — SODIUM CHLORIDE, PRESERVATIVE FREE 10 ML: 5 INJECTION INTRAVENOUS at 09:38

## 2024-12-24 RX ADMIN — VANCOMYCIN HYDROCHLORIDE 1000 MG: 1 INJECTION, POWDER, LYOPHILIZED, FOR SOLUTION INTRAVENOUS at 13:41

## 2024-12-24 RX ADMIN — CEFTAROLINE FOSAMIL 400 MG: 400 POWDER, FOR SOLUTION INTRAVENOUS at 02:30

## 2024-12-24 RX ADMIN — CARVEDILOL 25 MG: 12.5 TABLET, FILM COATED ORAL at 09:33

## 2024-12-24 RX ADMIN — PANTOPRAZOLE SODIUM 40 MG: 40 TABLET, DELAYED RELEASE ORAL at 06:29

## 2024-12-24 RX ADMIN — AMLODIPINE BESYLATE 5 MG: 5 TABLET ORAL at 09:34

## 2024-12-24 RX ADMIN — ENOXAPARIN SODIUM 30 MG: 100 INJECTION SUBCUTANEOUS at 09:34

## 2024-12-24 RX ADMIN — BUMETANIDE 1 MG: 1 TABLET ORAL at 09:34

## 2024-12-24 RX ADMIN — ARFORMOTEROL TARTRATE 15 MCG: 15 SOLUTION RESPIRATORY (INHALATION) at 07:48

## 2024-12-24 RX ADMIN — CEFTAROLINE FOSAMIL 400 MG: 400 POWDER, FOR SOLUTION INTRAVENOUS at 09:47

## 2024-12-24 ASSESSMENT — PAIN SCALES - GENERAL
PAINLEVEL_OUTOF10: 4
PAINLEVEL_OUTOF10: 5

## 2024-12-24 ASSESSMENT — PAIN DESCRIPTION - DESCRIPTORS: DESCRIPTORS: ACHING

## 2024-12-24 ASSESSMENT — PAIN DESCRIPTION - LOCATION: LOCATION: LEG

## 2024-12-24 ASSESSMENT — PAIN DESCRIPTION - ORIENTATION: ORIENTATION: LEFT

## 2024-12-24 NOTE — DISCHARGE SUMMARY
Discharge Summary       PATIENT ID: Marilee Oakes  MRN: [id number]   YOB: 1950    DATE OF ADMISSION: 12/17/2024 10:41 AM    DATE OF DISCHARGE: 12/24/24   PRIMARY CARE PROVIDER: Darien Potter MD     ATTENDING PHYSICIAN: DANIELA GONSALEZ MD  DISCHARGING PROVIDER: Nneka Gregg PA-C    To contact this individual call [phone] and ask the  to page.  If unavailable ask to be transferred the Adult Hospitalist Department.    CONSULTATIONS: IP CONSULT TO PODIATRY  IP CONSULT TO ORTHOPEDIC SURGERY  IP CONSULT TO INFECTIOUS DISEASES  IP CONSULT TO PULMONOLOGY  IP CONSULT TO CASE MANAGEMENT  IP CONSULT TO VASCULAR ACCESS TEAM    PROCEDURES/SURGERIES: * No surgery found *     ADMITTING DIAGNOSES & HOSPITAL COURSE:     Marilee Oakes is a 74 y.o. female who initially presented to [facility] ER on 12/17/2024 with chills, body ache and cough. Patient reported that her symptoms have been ongoing for about 3 days prior to arrival. Patient presented to the emergency room with stable vital signs. Abnormal labs include BUN of 22 creatinine of 1.8 which is not too far from baseline. CRP elevated at 10.10. Also noted elevated alk phos of 195. Also hemoglobin stable at baseline around 11. Patient with known history of left foot wound which she has been followed by wound care. Patient with recent admission from 10/21 to 10/30 for acute osteomyelitis of the left foot and completed course with doxycycline. Workup in the ER with left ankle x-ray shows chronic appearing changes in the distal tibia-fibula and ankle, no new cortical bone destruction, however given chronic deformities, osteomyelitis was difficult to exclude. In the ER, blood cultures were obtained, patient was started on cefepime in the ER and hospitalist service requested admit the patient for further evaluation management.       12/24  Patient seen and examined this a.m.  Sitting up in bedside chair comfortably.  No complaints at this time.  Very eager  ODIN Phone # [phone]     Follow up with Dr. Cornelius, Orthopedics outpatient.       DIET: diabetic diet    ACTIVITY: WBAT in fracture boot     WOUND CARE: x    EQUIPMENT needed: x      DISCHARGE MEDICATIONS:     Medication List        START taking these medications      doxycycline hyclate 100 MG tablet  Commonly known as: VIBRA-TABS  Take 1 tablet by mouth 2 times daily  Start taking on: January 1, 2025     traMADol 50 MG tablet  Commonly known as: ULTRAM  Take 0.5 tablets by mouth every 8 hours as needed for Pain for up to 3 days. Max Daily Amount: 75 mg            CONTINUE taking these medications      acetaminophen 500 MG tablet  Commonly known as: TYLENOL  Take 1 tablet by mouth every 6 hours as needed for Pain     albuterol sulfate  (90 Base) MCG/ACT inhaler  Commonly known as: PROVENTIL;VENTOLIN;PROAIR  Inhale 1 puff into the lungs every 4 hours as needed for Shortness of Breath     amLODIPine 5 MG tablet  Commonly known as: NORVASC     aspirin 81 MG chewable tablet  Take 1 tablet by mouth daily     balsum peru-castor oil Oint ointment  Apply 0.087 g topically 2 times daily     benzonatate 100 MG capsule  Commonly known as: TESSALON     bumetanide 1 MG tablet  Commonly known as: BUMEX  Take 1 tablet by mouth daily     carvedilol 25 MG tablet  Commonly known as: COREG  Take 1 tablet by mouth with breakfast and with evening meal     ferrous sulfate 325 (65 Fe) MG tablet  Commonly known as: IRON 325  Take 1 tablet by mouth daily (with breakfast)     isosorbide mononitrate 60 MG extended release tablet  Commonly known as: IMDUR  Take 1 tablet by mouth daily     Lidocaine (Anorectal) 5 % Crea     nystatin 535440 UNIT/GM cream  Commonly known as: MYCOSTATIN     omeprazole 40 MG delayed release capsule  Commonly known as: PRILOSEC  Take 1 capsule by mouth daily     ondansetron 4 MG disintegrating tablet  Commonly known as: ZOFRAN-ODT  Take 1 tablet by mouth 3 times daily as needed for Nausea or Vomiting

## 2024-12-24 NOTE — PLAN OF CARE
Problem: Chronic Conditions and Co-morbidities  Goal: Patient's chronic conditions and co-morbidity symptoms are monitored and maintained or improved  12/23/2024 2055 by Candelaria Cortez RN  Outcome: Progressing  12/23/2024 1027 by Agnes Pina RN  Outcome: Progressing  Flowsheets (Taken 12/23/2024 0800)  Care Plan - Patient's Chronic Conditions and Co-Morbidity Symptoms are Monitored and Maintained or Improved: Monitor and assess patient's chronic conditions and comorbid symptoms for stability, deterioration, or improvement     Problem: Discharge Planning  Goal: Discharge to home or other facility with appropriate resources  Outcome: Progressing  Flowsheets (Taken 12/23/2024 0800 by Agnes Pina, RN)  Discharge to home or other facility with appropriate resources:   Identify barriers to discharge with patient and caregiver   Arrange for needed discharge resources and transportation as appropriate   Identify discharge learning needs (meds, wound care, etc)     Problem: Safety - Adult  Goal: Free from fall injury  Outcome: Progressing  Flowsheets (Taken 12/23/2024 0800 by Agnes Pina RN)  Free From Fall Injury: Instruct family/caregiver on patient safety     Problem: ABCDS Injury Assessment  Goal: Absence of physical injury  Outcome: Progressing     Problem: Skin/Tissue Integrity  Goal: Absence of new skin breakdown  Description: 1.  Monitor for areas of redness and/or skin breakdown  2.  Assess vascular access sites hourly  3.  Every 4-6 hours minimum:  Change oxygen saturation probe site  4.  Every 4-6 hours:  If on nasal continuous positive airway pressure, respiratory therapy assess nares and determine need for appliance change or resting period.  Outcome: Progressing     Problem: Physical Therapy - Adult  Goal: By Discharge: Performs mobility at highest level of function for planned discharge setting.  See evaluation for individualized goals.  Description: FUNCTIONAL STATUS PRIOR TO ADMISSION: Patient was

## 2024-12-24 NOTE — DISCHARGE INSTRUCTIONS
Discharge Instructions       PATIENT ID: Marilee Oakes  MRN: [id number]   YOB: 1950    DATE OF ADMISSION: 12/17/2024   DATE OF DISCHARGE: 12/24/2024    PRIMARY CARE PROVIDER: Darien Potter     ATTENDING PHYSICIAN: Nika Pham MD   DISCHARGING PROVIDER: Nneka Gregg PA-C    To contact this individual call [phone] and ask the  to page.   If unavailable ask to be transferred the Adult Hospitalist Department.    DISCHARGE DIAGNOSES     Left foot wound  -Chronic left foot wound, x-ray shows chronic appearing changes in the distal tibia and fibula as well as ankle, osteomyelitis is difficult to exclude, noted elevated CRP  -Follow-up triple phase bone scan positive for osteomyelitis  -ID following: Will need long term IV abx. Vancomycin , duration until 12/31/24, inclusive. Will need afterwards Doxycycline 100mg PO BID LIFELONG unless undergoes LLE amputation extent per orthopedic/vascular assessment.   -Orthopedic recommended amputation, and recommended another opinion from [facility] as patient was interested in other procedures that may salvage the limb. Ortho not able to offer any other alternatives other than suppressive treatments with antibiotics and walking in a boot, versus amputation. She has other medical issues that compromise another surgery, especially one that would require open incisions which are also prone for not healing and getting further infection. Patient considering other alternatives, considering follow-up with podiatry as outpatient and instillation of antibiotic beads     Bacteremia  -Initial blood cultures (12/16) growing MRSA 3 of 4 bottles  -Repeat blood cultures (12/18): No growth x 4 days  -JUAN MANUEL (12/23): negative  -ID following, see above.     Urinary tract infection  -Urinalysis shows moderate blood, large leukocyte esterase, pyuria and bacteriuria  -Urine culture positive for ESBL, monitoring off abx at this time due to asx.   -ID following, see  Adryan, Orthopedics outpatient.       DIET: diabetic diet    ACTIVITY: WBAT in fracture boot     WOUND CARE: x    EQUIPMENT needed: x      DISCHARGE MEDICATIONS:   See Medication Reconciliation Form    It is important that you take the medication exactly as they are prescribed.   Keep your medication in the bottles provided by the pharmacist and keep a list of the medication names, dosages, and times to be taken in your wallet.   Do not take other medications without consulting your doctor.       NOTIFY YOUR PHYSICIAN FOR ANY OF THE FOLLOWING:   Fever over 101 degrees for 24 hours.   Chest pain, shortness of breath, fever, chills, nausea, vomiting, diarrhea, change in mentation, falling, weakness, bleeding. Severe pain or pain not relieved by medications.  Or, any other signs or symptoms that you may have questions about.      DISPOSITION:   x Home With:   OT  PT x HH  RN       SNF/Inpatient Rehab/LTAC    Independent/assisted living    Hospice    Other:     CDMP Checked:   Yes x     PROBLEM LIST Updated:  Yes x       Signed:   Nneka Gregg PA-C  12/24/2024  3:21 PM

## 2024-12-24 NOTE — CARE COORDINATION
Plan   Home with IV abx therapy and Home Health     CM arranged WC transport with [facility] at 4:30pm today after last dose of IV abx.  Cost is $168.00.  Patient is unable to pay today.  CM cleared with .     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[employer]-- $1571.25 for the week-- family aware  Will make arrangements with [employer]     [facility] [phone]     CM [facility]     Transportation  - BLS or  (ramp entrance to home)   said he would like to transport patient home if it is safe     Will be provided with 2nd Medicare letter prior to discharge     Contact   Wesley Oakes [phone]           Prior Level of Functioning: home with HH and 's assistance-as needed-    Needs assistance with mobility and housework.    Disposition: Home with HH IV abx   ASHANTI: 12/24  If SNF or IPR: Date FOC offered:   Date FOC received:   Accepting facility:   Date authorization started with reference number:   Date authorization received and expires:   Follow up appointments:   DME needed: none-- Has ramp, w/c, hospital bed rollator   Transportation at discharge: family or BLS  IM/IMM Medicare/ letter given: y  Is patient a  and connected with [facility]?               If yes, was [place] transfer form completed and [facility] notified?   Caregiver Contact:  Wesley Oakes [phone]   Discharge Caregiver contacted prior to discharge?   Care Conference needed?   Barriers to discharge:  PICC line and IV abx set up     CM met with patient several times today about transition of care planing.     Patient in agreement with home with HH and IV abx therapy.  After final IV abx orders received, patient expressed to ID physician that she was not comfortable with the IV abx due to the side effects..   He

## 2024-12-24 NOTE — PROCEDURES
PROCEDURE NOTE  Date: 12/24/2024   Name: Marilee Oakes  YOB: 1950    Procedures    Midline Insertion and Progress Note    PRE-PROCEDURE VERIFICATION  Correct Procedure: yes  Correct Site: yes  Temperature: Temp: 99.4 °F (37.4 °C), Temperature Source:    Recent Labs     12/24/24  0713   BUN 19      WBC 6.6     Allergies: Daptomycin, Sulfa antibiotics, and Amoxicillin    PROCEDURE DETAIL  A single midline IV catheter was started for Home IV Therapy. The following documentation is in addition to the Midline properties in the lines/airways flowsheet:  Xylocaine 1% used intradermally: Yes  Lot #: [id number]  Catheter to vein ratio: 36%  Catheter Total Length: 13 (cm)  External Catheter Length: 36 (cm)  Circumference at 10 cm above ac: 35 (cm)  Vein Selection for Midline: right basilic  Complication Related to Insertion: none  This line is for non-vesicant/non- irritant IV infusion only.  Line is okay to use.  RN made aware.   ANISA GALLAGHER RN

## 2024-12-24 NOTE — CARE COORDINATION
Transition of Care Plan:    RUR:  30%    This CM went in to speak with  at bedside who explained concern for payment of IV med.  Co-pay for IV medication is 3601.25.  HH with Miguel Garrido [phone] Patricio from [employer] [phone] will be coming to do teaching and also speak with family in regards to payment.  Please also reference CM note from 12/23 as well to clarify discussions for dc planning.     CARLEE SALINAS  10:25 AM

## 2024-12-24 NOTE — PROGRESS NOTES
Estuardo MAE MD   Stopped at 12/22/24 1613    potassium chloride (KLOR-CON) extended release tablet 40 mEq  40 mEq Oral PRN Estuardo Johnson MD   40 mEq at 12/21/24 1142    Or    potassium bicarb-citric acid (EFFER-K) effervescent tablet 40 mEq  40 mEq Oral PRN Estuardo Johnson MD        Or    potassium chloride 10 mEq/100 mL IVPB (Peripheral Line)  10 mEq IntraVENous PRN Estuardo Johnson MD        magnesium sulfate 2000 mg in 50 mL IVPB premix  2,000 mg IntraVENous PRN Estuardo Johnson MD        enoxaparin Sodium (LOVENOX) injection 30 mg  30 mg SubCUTAneous Q12H Estuardo Johnson MD   30 mg at 12/24/24 0934    ondansetron (ZOFRAN-ODT) disintegrating tablet 4 mg  4 mg Oral Q8H PRN Estuardo Johnson MD        Or    ondansetron (ZOFRAN) injection 4 mg  4 mg IntraVENous Q6H PRN Estuardo Johnson MD        polyethylene glycol (GLYCOLAX) packet 17 g  17 g Oral Daily PRN Estuardo Johnson MD        acetaminophen (TYLENOL) tablet 650 mg  650 mg Oral Q6H PRN Estuardo Johnson MD   650 mg at 12/24/24 0629    Or    acetaminophen (TYLENOL) suppository 650 mg  650 mg Rectal Q6H PRN Estuardo Johnson MD           Family History   Problem Relation Age of Onset    Hypertension Mother     Heart Disease Mother     Crohn's Disease Mother     Osteoarthritis Mother     High Cholesterol Mother     COPD Child     Inflam Bowel Dis Child     COPD Brother     Hypertension Brother     High Cholesterol Brother     COPD Sister     Thyroid Disease Sister     Hypertension Sister     High Cholesterol Sister     Alcohol Abuse Father        Social History     Socioeconomic History    Marital status:      Spouse name: Not on file    Number of children: Not on file    Years of education: Not on file    Highest education level: Not on file   Occupational History    Not on file   Tobacco Use    Smoking status: Never     Passive exposure: Never    Smokeless tobacco: Never   Vaping Use    Vaping status: Never Used   Substance and Sexual Activity    Alcohol use: No    Drug use: No    Sexual  krusei by PCR Not detected        Candida parapsilosis by PCR Not detected        Candida tropicalis by PCR Not detected        Cryptococcus neoformans/gattii by PCR Not detected        Resistant gene targets          Resistant gene meca/c & mrej by PCR Detected        [employer] test comment       False positive results may rarely occur. Correlate with clinical,epidemiologic, and other laboratory findings           Comment: Please see BCID Interpretation Guide in EPIC Links       COVID-19 & Influenza Combo [[id number]] Collected: 12/16/24 1824    Order Status: Completed Specimen: Nasopharyngeal Updated: 12/16/24 1903     Source Nasopharyngeal        SARS-CoV-2, PCR Not detected        Comment: Not Detected results do not preclude SARS-CoV-2 infection and should not be used as the sole basis for patient management decisions.Results must be combined with clinical observations, patient history, and epidemiological information.        Rapid Influenza A By PCR Not detected        Rapid Influenza B By PCR Not detected        Comment: Testing was performed using rajesh Tanvi SARS-CoV-2 and Influenza A/B nucleic acid assay.  This test is a multiplex Real-Time Reverse Transcriptase Polymerase Chain Reaction (RT-PCR) based in vitro diagnostic test intended for the qualitative detection of nucleic acids from SARS-CoV-2, Influenza A, and Influenza B in nasopharyngeal and nasal swab specimens for use under the FDA's Emergency Use Authorization (EUA) only.     Fact sheet for Patients: https://www.fda.gov/media/165842/download  Fact sheet for Healthcare Providers: https://www.fda.fov/media/033456/download         Blood Culture 1 [[id number]]  (Abnormal) Collected: 12/16/24 1824    Order Status: Completed Specimen: Blood Updated: 12/19/24 0855     Special Requests --        NO SPECIAL REQUESTS  RIGHT  Antecubital       Culture       Methicillin Resistant Staphylococcus aureus GROWING IN BOTH BOTTLES DRAWN R  SITE REFER TO [id number]

## 2024-12-24 NOTE — PROGRESS NOTES
RN forgot to print patients discharge summary. RN called patients , Wesley, to review paperwork and answer any questions. All questions were answered, RN asked if patient needed copy of discharge summary. Patients  stated this was not neccessary and that he had everything he needed.

## 2024-12-27 NOTE — TELEPHONE ENCOUNTER
Houston Cornelius MD Ruby, Karrie, XUANN; Mai Nair, XUANN3 days ago       Please schedule patient with me for follow up visit in mid-January 2025.

## 2024-12-30 ENCOUNTER — TELEPHONE (OUTPATIENT)
Age: 74
End: 2024-12-30

## 2024-12-30 DIAGNOSIS — R50.9 FEVER, UNSPECIFIED FEVER CAUSE: ICD-10-CM

## 2024-12-30 DIAGNOSIS — L03.116 CELLULITIS OF LEFT LOWER EXTREMITY: ICD-10-CM

## 2024-12-30 DIAGNOSIS — M86.062 ACUTE HEMATOGENOUS OSTEOMYELITIS OF LEFT TIBIA: Primary | ICD-10-CM

## 2024-12-30 NOTE — TELEPHONE ENCOUNTER
Called to speak to  for clarification.      nurse Carol said that the patient has a midline. Not a PICC line. She said they are seeing a lot of these lately. She said last week the line was sluggish, but today she spent an hour and she was unable to get anything. The patients  will not let any other  nurse in their home. He is willing to take the patient to [facility]. Sent a perfect serve to [facility] to confirm labs and make sure nothing additional is needed.

## 2024-12-30 NOTE — TELEPHONE ENCOUNTER
would not allow any other nurse in home except for caller  End of therapy is 12/31/24  HH nurse is unable to draw on pic line-   Does Dr Cornelius want to order labs through [facility]- pt spouse is willing to take pt to [facility] but will not allow any other nurse in their home  Please advise

## 2024-12-31 ENCOUNTER — TELEPHONE (OUTPATIENT)
Age: 74
End: 2024-12-31

## 2024-12-31 LAB
BASOPHILS # BLD AUTO: 0 X10E3/UL (ref 0–0.2)
BASOPHILS NFR BLD AUTO: 0 %
EOSINOPHIL # BLD AUTO: 0.4 X10E3/UL (ref 0–0.4)
EOSINOPHIL NFR BLD AUTO: 6 %
ERYTHROCYTE [DISTWIDTH] IN BLOOD BY AUTOMATED COUNT: 15.8 % (ref 11.7–15.4)
HCT VFR BLD AUTO: 32.5 % (ref 34–46.6)
HGB BLD-MCNC: 10.2 G/DL (ref 11.1–15.9)
IMM GRANULOCYTES # BLD AUTO: 0 X10E3/UL (ref 0–0.1)
IMM GRANULOCYTES NFR BLD AUTO: 0 %
LYMPHOCYTES # BLD AUTO: 0.8 X10E3/UL (ref 0.7–3.1)
LYMPHOCYTES NFR BLD AUTO: 13 %
MCH RBC QN AUTO: 27.6 PG (ref 26.6–33)
MCHC RBC AUTO-ENTMCNC: 31.4 G/DL (ref 31.5–35.7)
MCV RBC AUTO: 88 FL (ref 79–97)
MONOCYTES # BLD AUTO: 0.4 X10E3/UL (ref 0.1–0.9)
MONOCYTES NFR BLD AUTO: 7 %
NEUTROPHILS # BLD AUTO: 4.5 X10E3/UL (ref 1.4–7)
NEUTROPHILS NFR BLD AUTO: 74 %
PLATELET # BLD AUTO: 230 X10E3/UL (ref 150–450)
RBC # BLD AUTO: 3.7 X10E6/UL (ref 3.77–5.28)
WBC # BLD AUTO: 6.2 X10E3/UL (ref 3.4–10.8)

## 2024-12-31 NOTE — TELEPHONE ENCOUNTER
Spoke to Kp at [employer] and advised him of Dr. Rosado message to keep the line flushed until labs are reviewed and that yes today is end date for IV Vancomycin.

## 2024-12-31 NOTE — TELEPHONE ENCOUNTER
Spoke to patients , advised him per Dr. Cornelius ok to go to [facility] to have these drawn, orders placed.

## 2025-01-01 LAB
ALBUMIN SERPL-MCNC: 3.6 G/DL (ref 3.8–4.8)
ALP SERPL-CCNC: 157 IU/L (ref 44–121)
ALT SERPL-CCNC: 11 IU/L (ref 0–32)
AST SERPL-CCNC: 14 IU/L (ref 0–40)
BILIRUB SERPL-MCNC: 0.6 MG/DL (ref 0–1.2)
BUN SERPL-MCNC: 21 MG/DL (ref 8–27)
BUN/CREAT SERPL: 14 (ref 12–28)
CALCIUM SERPL-MCNC: 9.2 MG/DL (ref 8.7–10.3)
CHLORIDE SERPL-SCNC: 105 MMOL/L (ref 96–106)
CO2 SERPL-SCNC: 25 MMOL/L (ref 20–29)
CREAT SERPL-MCNC: 1.51 MG/DL (ref 0.57–1)
CRP SERPL-MCNC: 17 MG/L (ref 0–10)
EGFRCR SERPLBLD CKD-EPI 2021: 37 ML/MIN/1.73
GLOBULIN SER CALC-MCNC: 2 G/DL (ref 1.5–4.5)
GLUCOSE SERPL-MCNC: 129 MG/DL (ref 70–99)
POTASSIUM SERPL-SCNC: 4.5 MMOL/L (ref 3.5–5.2)
PROT SERPL-MCNC: 5.6 G/DL (ref 6–8.5)
REPORT: NORMAL
SODIUM SERPL-SCNC: 142 MMOL/L (ref 134–144)

## 2025-01-04 ENCOUNTER — APPOINTMENT (OUTPATIENT)
Facility: HOSPITAL | Age: 75
DRG: 291 | End: 2025-01-04
Payer: MEDICARE

## 2025-01-04 ENCOUNTER — HOSPITAL ENCOUNTER (INPATIENT)
Facility: HOSPITAL | Age: 75
LOS: 4 days | Discharge: HOME | DRG: 291 | End: 2025-01-08
Attending: STUDENT IN AN ORGANIZED HEALTH CARE EDUCATION/TRAINING PROGRAM | Admitting: INTERNAL MEDICINE
Payer: MEDICARE

## 2025-01-04 DIAGNOSIS — J96.01 ACUTE RESPIRATORY FAILURE WITH HYPOXIA: Primary | ICD-10-CM

## 2025-01-04 DIAGNOSIS — I50.9 ACUTE ON CHRONIC CONGESTIVE HEART FAILURE, UNSPECIFIED HEART FAILURE TYPE (HCC): ICD-10-CM

## 2025-01-04 PROBLEM — R06.09 DOE (DYSPNEA ON EXERTION): Status: ACTIVE | Noted: 2025-01-04

## 2025-01-04 PROBLEM — R06.02 SOB (SHORTNESS OF BREATH): Status: ACTIVE | Noted: 2025-01-04

## 2025-01-04 LAB
ALBUMIN SERPL-MCNC: 3 G/DL (ref 3.5–5)
ALBUMIN/GLOB SERPL: 0.9 (ref 1.1–2.2)
ALP SERPL-CCNC: 144 U/L (ref 45–117)
ALT SERPL-CCNC: 15 U/L (ref 12–78)
ANION GAP SERPL CALC-SCNC: 6 MMOL/L (ref 2–12)
AST SERPL-CCNC: 27 U/L (ref 15–37)
BASOPHILS # BLD: 0 K/UL (ref 0–0.1)
BASOPHILS NFR BLD: 0 % (ref 0–1)
BILIRUB SERPL-MCNC: 0.7 MG/DL (ref 0.2–1)
BUN SERPL-MCNC: 19 MG/DL (ref 6–20)
BUN/CREAT SERPL: 13 (ref 12–20)
CALCIUM SERPL-MCNC: 9 MG/DL (ref 8.5–10.1)
CHLORIDE SERPL-SCNC: 109 MMOL/L (ref 97–108)
CO2 SERPL-SCNC: 25 MMOL/L (ref 21–32)
CREAT SERPL-MCNC: 1.5 MG/DL (ref 0.55–1.02)
DIFFERENTIAL METHOD BLD: ABNORMAL
ECHO BSA: 2.21 M2
EOSINOPHIL # BLD: 0.4 K/UL (ref 0–0.4)
EOSINOPHIL NFR BLD: 7 % (ref 0–7)
ERYTHROCYTE [DISTWIDTH] IN BLOOD BY AUTOMATED COUNT: 16.8 % (ref 11.5–14.5)
FLUAV RNA SPEC QL NAA+PROBE: NOT DETECTED
FLUBV RNA SPEC QL NAA+PROBE: NOT DETECTED
GLOBULIN SER CALC-MCNC: 3.5 G/DL (ref 2–4)
GLUCOSE BLD STRIP.AUTO-MCNC: 168 MG/DL (ref 65–117)
GLUCOSE SERPL-MCNC: 103 MG/DL (ref 65–100)
HCT VFR BLD AUTO: 33.1 % (ref 35–47)
HGB BLD-MCNC: 10.1 G/DL (ref 11.5–16)
IMM GRANULOCYTES # BLD AUTO: 0 K/UL (ref 0–0.04)
IMM GRANULOCYTES NFR BLD AUTO: 0 % (ref 0–0.5)
LYMPHOCYTES # BLD: 0.8 K/UL (ref 0.8–3.5)
LYMPHOCYTES NFR BLD: 15 % (ref 12–49)
MAGNESIUM SERPL-MCNC: 1.5 MG/DL (ref 1.6–2.4)
MCH RBC QN AUTO: 27.2 PG (ref 26–34)
MCHC RBC AUTO-ENTMCNC: 30.5 G/DL (ref 30–36.5)
MCV RBC AUTO: 89.2 FL (ref 80–99)
MONOCYTES # BLD: 0.4 K/UL (ref 0–1)
MONOCYTES NFR BLD: 6 % (ref 5–13)
NEUTS SEG # BLD: 4 K/UL (ref 1.8–8)
NEUTS SEG NFR BLD: 72 % (ref 32–75)
NRBC # BLD: 0 K/UL (ref 0–0.01)
NRBC BLD-RTO: 0 PER 100 WBC
NT PRO BNP: 3566 PG/ML
PLATELET # BLD AUTO: 225 K/UL (ref 150–400)
PMV BLD AUTO: 10.2 FL (ref 8.9–12.9)
POTASSIUM SERPL-SCNC: 4.2 MMOL/L (ref 3.5–5.1)
PROT SERPL-MCNC: 6.5 G/DL (ref 6.4–8.2)
RBC # BLD AUTO: 3.71 M/UL (ref 3.8–5.2)
SARS-COV-2 RNA RESP QL NAA+PROBE: NOT DETECTED
SERVICE CMNT-IMP: ABNORMAL
SODIUM SERPL-SCNC: 140 MMOL/L (ref 136–145)
SOURCE: NORMAL
TROPONIN I SERPL HS-MCNC: 25 NG/L (ref 0–51)
WBC # BLD AUTO: 5.7 K/UL (ref 3.6–11)

## 2025-01-04 PROCEDURE — 93005 ELECTROCARDIOGRAM TRACING: CPT | Performed by: STUDENT IN AN ORGANIZED HEALTH CARE EDUCATION/TRAINING PROGRAM

## 2025-01-04 PROCEDURE — 84484 ASSAY OF TROPONIN QUANT: CPT

## 2025-01-04 PROCEDURE — 2500000003 HC RX 250 WO HCPCS: Performed by: INTERNAL MEDICINE

## 2025-01-04 PROCEDURE — 87636 SARSCOV2 & INF A&B AMP PRB: CPT

## 2025-01-04 PROCEDURE — 6370000000 HC RX 637 (ALT 250 FOR IP): Performed by: INTERNAL MEDICINE

## 2025-01-04 PROCEDURE — 71275 CT ANGIOGRAPHY CHEST: CPT

## 2025-01-04 PROCEDURE — 1100000003 HC PRIVATE W/ TELEMETRY

## 2025-01-04 PROCEDURE — 6370000000 HC RX 637 (ALT 250 FOR IP): Performed by: NURSE PRACTITIONER

## 2025-01-04 PROCEDURE — 36415 COLL VENOUS BLD VENIPUNCTURE: CPT

## 2025-01-04 PROCEDURE — 83735 ASSAY OF MAGNESIUM: CPT

## 2025-01-04 PROCEDURE — 85025 COMPLETE CBC W/AUTO DIFF WBC: CPT

## 2025-01-04 PROCEDURE — 6360000004 HC RX CONTRAST MEDICATION: Performed by: RADIOLOGY

## 2025-01-04 PROCEDURE — 71046 X-RAY EXAM CHEST 2 VIEWS: CPT

## 2025-01-04 PROCEDURE — 93970 EXTREMITY STUDY: CPT

## 2025-01-04 PROCEDURE — 6360000002 HC RX W HCPCS: Performed by: STUDENT IN AN ORGANIZED HEALTH CARE EDUCATION/TRAINING PROGRAM

## 2025-01-04 PROCEDURE — 6360000002 HC RX W HCPCS: Performed by: INTERNAL MEDICINE

## 2025-01-04 PROCEDURE — 80053 COMPREHEN METABOLIC PANEL: CPT

## 2025-01-04 PROCEDURE — 99285 EMERGENCY DEPT VISIT HI MDM: CPT

## 2025-01-04 PROCEDURE — 83880 ASSAY OF NATRIURETIC PEPTIDE: CPT

## 2025-01-04 PROCEDURE — 82962 GLUCOSE BLOOD TEST: CPT

## 2025-01-04 RX ORDER — ACETAMINOPHEN 325 MG/1
650 TABLET ORAL EVERY 6 HOURS PRN
Status: DISCONTINUED | OUTPATIENT
Start: 2025-01-04 | End: 2025-01-04 | Stop reason: SDUPTHER

## 2025-01-04 RX ORDER — QUETIAPINE FUMARATE 25 MG/1
25 TABLET, FILM COATED ORAL
Status: DISCONTINUED | OUTPATIENT
Start: 2025-01-04 | End: 2025-01-08 | Stop reason: HOSPADM

## 2025-01-04 RX ORDER — ALBUTEROL SULFATE 0.83 MG/ML
2.5 SOLUTION RESPIRATORY (INHALATION) EVERY 4 HOURS PRN
Status: DISCONTINUED | OUTPATIENT
Start: 2025-01-04 | End: 2025-01-08 | Stop reason: HOSPADM

## 2025-01-04 RX ORDER — POTASSIUM CHLORIDE 1500 MG/1
20 TABLET, EXTENDED RELEASE ORAL 2 TIMES DAILY
Status: DISCONTINUED | OUTPATIENT
Start: 2025-01-04 | End: 2025-01-05

## 2025-01-04 RX ORDER — ISOSORBIDE MONONITRATE 30 MG/1
60 TABLET, EXTENDED RELEASE ORAL DAILY
Status: DISCONTINUED | OUTPATIENT
Start: 2025-01-04 | End: 2025-01-08 | Stop reason: HOSPADM

## 2025-01-04 RX ORDER — OXYBUTYNIN CHLORIDE 5 MG/1
5 TABLET ORAL 2 TIMES DAILY
Status: DISCONTINUED | OUTPATIENT
Start: 2025-01-04 | End: 2025-01-08 | Stop reason: HOSPADM

## 2025-01-04 RX ORDER — CARVEDILOL 12.5 MG/1
25 TABLET ORAL 2 TIMES DAILY WITH MEALS
Status: DISCONTINUED | OUTPATIENT
Start: 2025-01-05 | End: 2025-01-08 | Stop reason: HOSPADM

## 2025-01-04 RX ORDER — DEXTROSE MONOHYDRATE 100 MG/ML
INJECTION, SOLUTION INTRAVENOUS CONTINUOUS PRN
Status: DISCONTINUED | OUTPATIENT
Start: 2025-01-04 | End: 2025-01-08 | Stop reason: HOSPADM

## 2025-01-04 RX ORDER — DOXYCYCLINE HYCLATE 100 MG
100 TABLET ORAL 2 TIMES DAILY
Status: DISCONTINUED | OUTPATIENT
Start: 2025-01-04 | End: 2025-01-08 | Stop reason: HOSPADM

## 2025-01-04 RX ORDER — PANTOPRAZOLE SODIUM 40 MG/1
40 TABLET, DELAYED RELEASE ORAL
Status: DISCONTINUED | OUTPATIENT
Start: 2025-01-05 | End: 2025-01-08 | Stop reason: HOSPADM

## 2025-01-04 RX ORDER — ONDANSETRON 2 MG/ML
4 INJECTION INTRAMUSCULAR; INTRAVENOUS EVERY 6 HOURS PRN
Status: DISCONTINUED | OUTPATIENT
Start: 2025-01-04 | End: 2025-01-08 | Stop reason: HOSPADM

## 2025-01-04 RX ORDER — POTASSIUM CHLORIDE 7.45 MG/ML
10 INJECTION INTRAVENOUS PRN
Status: DISCONTINUED | OUTPATIENT
Start: 2025-01-04 | End: 2025-01-06

## 2025-01-04 RX ORDER — POLYETHYLENE GLYCOL 3350 17 G/17G
17 POWDER, FOR SOLUTION ORAL DAILY PRN
Status: DISCONTINUED | OUTPATIENT
Start: 2025-01-04 | End: 2025-01-08 | Stop reason: HOSPADM

## 2025-01-04 RX ORDER — ARFORMOTEROL TARTRATE 15 UG/2ML
15 SOLUTION RESPIRATORY (INHALATION)
Status: DISCONTINUED | OUTPATIENT
Start: 2025-01-04 | End: 2025-01-05

## 2025-01-04 RX ORDER — ACETAMINOPHEN 650 MG/1
650 SUPPOSITORY RECTAL EVERY 6 HOURS PRN
Status: DISCONTINUED | OUTPATIENT
Start: 2025-01-04 | End: 2025-01-08 | Stop reason: HOSPADM

## 2025-01-04 RX ORDER — GLUCAGON 1 MG/ML
1 KIT INJECTION PRN
Status: DISCONTINUED | OUTPATIENT
Start: 2025-01-04 | End: 2025-01-08 | Stop reason: HOSPADM

## 2025-01-04 RX ORDER — AMLODIPINE BESYLATE 5 MG/1
5 TABLET ORAL DAILY
Status: DISCONTINUED | OUTPATIENT
Start: 2025-01-04 | End: 2025-01-08 | Stop reason: HOSPADM

## 2025-01-04 RX ORDER — IOPAMIDOL 755 MG/ML
100 INJECTION, SOLUTION INTRAVASCULAR
Status: COMPLETED | OUTPATIENT
Start: 2025-01-04 | End: 2025-01-04

## 2025-01-04 RX ORDER — MAGNESIUM SULFATE IN WATER 40 MG/ML
2000 INJECTION, SOLUTION INTRAVENOUS PRN
Status: DISCONTINUED | OUTPATIENT
Start: 2025-01-04 | End: 2025-01-06

## 2025-01-04 RX ORDER — FERROUS SULFATE 325(65) MG
325 TABLET ORAL
Status: DISCONTINUED | OUTPATIENT
Start: 2025-01-05 | End: 2025-01-08 | Stop reason: HOSPADM

## 2025-01-04 RX ORDER — SODIUM CHLORIDE 0.9 % (FLUSH) 0.9 %
5-40 SYRINGE (ML) INJECTION PRN
Status: DISCONTINUED | OUTPATIENT
Start: 2025-01-04 | End: 2025-01-08 | Stop reason: HOSPADM

## 2025-01-04 RX ORDER — BUDESONIDE 0.25 MG/2ML
0.25 INHALANT ORAL
Status: DISCONTINUED | OUTPATIENT
Start: 2025-01-04 | End: 2025-01-05

## 2025-01-04 RX ORDER — MICONAZOLE NITRATE 20 MG/G
CREAM TOPICAL 2 TIMES DAILY
Status: DISCONTINUED | OUTPATIENT
Start: 2025-01-04 | End: 2025-01-04 | Stop reason: SDUPTHER

## 2025-01-04 RX ORDER — FAMOTIDINE 20 MG/1
20 TABLET, FILM COATED ORAL 2 TIMES DAILY PRN
Status: DISCONTINUED | OUTPATIENT
Start: 2025-01-04 | End: 2025-01-08 | Stop reason: HOSPADM

## 2025-01-04 RX ORDER — FUROSEMIDE 10 MG/ML
40 INJECTION INTRAMUSCULAR; INTRAVENOUS 2 TIMES DAILY
Status: DISCONTINUED | OUTPATIENT
Start: 2025-01-04 | End: 2025-01-07

## 2025-01-04 RX ORDER — ACETAMINOPHEN 325 MG/1
650 TABLET ORAL EVERY 6 HOURS PRN
Status: DISCONTINUED | OUTPATIENT
Start: 2025-01-04 | End: 2025-01-08 | Stop reason: HOSPADM

## 2025-01-04 RX ORDER — ONDANSETRON 2 MG/ML
4 INJECTION INTRAMUSCULAR; INTRAVENOUS EVERY 6 HOURS PRN
Status: DISCONTINUED | OUTPATIENT
Start: 2025-01-04 | End: 2025-01-04 | Stop reason: SDUPTHER

## 2025-01-04 RX ORDER — ENOXAPARIN SODIUM 100 MG/ML
30 INJECTION SUBCUTANEOUS 2 TIMES DAILY
Status: DISCONTINUED | OUTPATIENT
Start: 2025-01-04 | End: 2025-01-08 | Stop reason: HOSPADM

## 2025-01-04 RX ORDER — POTASSIUM CHLORIDE 1500 MG/1
40 TABLET, EXTENDED RELEASE ORAL PRN
Status: DISCONTINUED | OUTPATIENT
Start: 2025-01-04 | End: 2025-01-06

## 2025-01-04 RX ORDER — INSULIN LISPRO 100 [IU]/ML
0-4 INJECTION, SOLUTION INTRAVENOUS; SUBCUTANEOUS
Status: DISCONTINUED | OUTPATIENT
Start: 2025-01-04 | End: 2025-01-08 | Stop reason: HOSPADM

## 2025-01-04 RX ORDER — MAGNESIUM SULFATE IN WATER 40 MG/ML
2000 INJECTION, SOLUTION INTRAVENOUS
Status: COMPLETED | OUTPATIENT
Start: 2025-01-04 | End: 2025-01-04

## 2025-01-04 RX ORDER — ONDANSETRON 4 MG/1
4 TABLET, ORALLY DISINTEGRATING ORAL EVERY 8 HOURS PRN
Status: DISCONTINUED | OUTPATIENT
Start: 2025-01-04 | End: 2025-01-08 | Stop reason: HOSPADM

## 2025-01-04 RX ORDER — SODIUM CHLORIDE 0.9 % (FLUSH) 0.9 %
5-40 SYRINGE (ML) INJECTION EVERY 12 HOURS SCHEDULED
Status: DISCONTINUED | OUTPATIENT
Start: 2025-01-04 | End: 2025-01-08 | Stop reason: HOSPADM

## 2025-01-04 RX ORDER — ASPIRIN 81 MG/1
81 TABLET, CHEWABLE ORAL DAILY
Status: DISCONTINUED | OUTPATIENT
Start: 2025-01-04 | End: 2025-01-08 | Stop reason: HOSPADM

## 2025-01-04 RX ORDER — SODIUM CHLORIDE 9 MG/ML
INJECTION, SOLUTION INTRAVENOUS PRN
Status: DISCONTINUED | OUTPATIENT
Start: 2025-01-04 | End: 2025-01-08 | Stop reason: HOSPADM

## 2025-01-04 RX ORDER — PREGABALIN 75 MG/1
75 CAPSULE ORAL NIGHTLY
Status: DISCONTINUED | OUTPATIENT
Start: 2025-01-04 | End: 2025-01-08 | Stop reason: HOSPADM

## 2025-01-04 RX ORDER — PREGABALIN 75 MG/1
75 CAPSULE ORAL DAILY
Status: DISCONTINUED | OUTPATIENT
Start: 2025-01-04 | End: 2025-01-04

## 2025-01-04 RX ORDER — ALBUTEROL SULFATE 90 UG/1
2 INHALANT RESPIRATORY (INHALATION) EVERY 4 HOURS PRN
Status: DISCONTINUED | OUTPATIENT
Start: 2025-01-04 | End: 2025-01-04 | Stop reason: SDUPTHER

## 2025-01-04 RX ORDER — VITAMIN B COMPLEX
1000 TABLET ORAL DAILY
Status: DISCONTINUED | OUTPATIENT
Start: 2025-01-04 | End: 2025-01-08 | Stop reason: HOSPADM

## 2025-01-04 RX ORDER — FUROSEMIDE 10 MG/ML
40 INJECTION INTRAMUSCULAR; INTRAVENOUS ONCE
Status: COMPLETED | OUTPATIENT
Start: 2025-01-04 | End: 2025-01-04

## 2025-01-04 RX ADMIN — IOPAMIDOL 100 ML: 755 INJECTION, SOLUTION INTRAVENOUS at 14:41

## 2025-01-04 RX ADMIN — PREGABALIN 75 MG: 75 CAPSULE ORAL at 21:19

## 2025-01-04 RX ADMIN — FUROSEMIDE 40 MG: 10 INJECTION, SOLUTION INTRAMUSCULAR; INTRAVENOUS at 22:07

## 2025-01-04 RX ADMIN — ENOXAPARIN SODIUM 30 MG: 100 INJECTION SUBCUTANEOUS at 21:19

## 2025-01-04 RX ADMIN — QUETIAPINE FUMARATE 25 MG: 25 TABLET ORAL at 21:19

## 2025-01-04 RX ADMIN — SODIUM CHLORIDE, PRESERVATIVE FREE 10 ML: 5 INJECTION INTRAVENOUS at 21:22

## 2025-01-04 RX ADMIN — POTASSIUM CHLORIDE 20 MEQ: 1500 TABLET, EXTENDED RELEASE ORAL at 21:19

## 2025-01-04 RX ADMIN — MAGNESIUM SULFATE HEPTAHYDRATE 2000 MG: 40 INJECTION, SOLUTION INTRAVENOUS at 16:20

## 2025-01-04 RX ADMIN — FUROSEMIDE 40 MG: 10 INJECTION, SOLUTION INTRAMUSCULAR; INTRAVENOUS at 16:17

## 2025-01-04 RX ADMIN — DOXYCYCLINE HYCLATE 100 MG: 100 TABLET, COATED ORAL at 21:19

## 2025-01-04 ASSESSMENT — PAIN - FUNCTIONAL ASSESSMENT: PAIN_FUNCTIONAL_ASSESSMENT: 0-10

## 2025-01-04 NOTE — ED NOTES
Pt rang out stating she soiled herself. This RN changed brief. Stage 1 pressure wound noted on sacrum. Mepilex applied.

## 2025-01-04 NOTE — ED PROVIDER NOTES
[facility] EMERGENCY DEPT  EMERGENCY DEPARTMENT ENCOUNTER       Pt Name: Marilee Oakes  MRN: [id number]  Birthdate 1950  Date of evaluation: 1/4/2025  Provider: Campos Merritt MD   PCP: Darien Potter MD  Note Started: 1:19 PM EST 1/4/25     CHIEF COMPLAINT       Chief Complaint   Patient presents with   • Shortness of Breath     Pt arrives via EMS from home w CC of sob onset today, worse while lying flat & w/ exertion. Pt arrives on 2L NC, pt reports using at home PRN. EMS reports cold like symptoms & recent flu exposure.      HISTORY OF PRESENT ILLNESS: 1 or more elements      History From: patient and , History limited by: none     Marilee Oakes is a 74 y.o. female with history of below including coronary artery disease, atrial fibrillation not on anticoagulation with Watchman procedure, recurrent orthopedic injury to left ankle requiring multiple procedures.  Recently hospitalized and discharged approximately 1.5 weeks ago.  She presents the emergency department shortness of breath that worsened this morning.  She is requiring O2 via nasal cannula.  Difficult for her to tell if she has worsening lower extremity swelling.  She denies chest pain.    She has gained 6 pounds in the past 5 days per     Please See MDM for Additional Details of the HPI/PMH  Nursing Notes were all reviewed and agreed with or any disagreements were addressed in the HPI.     REVIEW OF SYSTEMS        Positives and Pertinent negatives as per HPI.    PAST HISTORY     Past Medical History:  Past Medical History:   Diagnosis Date   • Acute and chronic respiratory failure with hypoxia    • Age-related osteoporosis without current pathological fracture    • Age-related osteoporosis without current pathological fracture    • Anemia in chronic kidney disease    • Anxiety and depression 01/22/2018   • Arthritis     OA   • Asthma     UNCOMPLICATED   • Atherosclerotic heart disease of native coronary artery with angina  APRN - NP [facility] ANGIO [facility]   • LEG SURGERY Left 04/30/2024    ANKLE INCISION AND DRAINAGE WITH HARDWARE REMOVAL performed by Edgardo Farnsworth DO at [facility] MA[facility] OR   • FRANCISCA STEREO BREAST BX W LOC DEVICE 1ST LESION RIGHT Right 05/10/2022    FRANCISCA STEROTACTIC LOC BREAST BIOPSY RIGHT 5/10/2022 [facility]   • PACEMAKER     • IL UNLISTED PROCEDURE CARDIAC SURGERY      stent       Family History:  Family History   Problem Relation Age of Onset   • Hypertension Mother    • Heart Disease Mother    • Crohn's Disease Mother    • Osteoarthritis Mother    • High Cholesterol Mother    • COPD Child    • Inflam Bowel Dis Child    • COPD Brother    • Hypertension Brother    • High Cholesterol Brother    • COPD Sister    • Thyroid Disease Sister    • Hypertension Sister    • High Cholesterol Sister    • Alcohol Abuse Father        Social History:  Social History     Tobacco Use   • Smoking status: Never     Passive exposure: Never   • Smokeless tobacco: Never   Vaping Use   • Vaping status: Never Used   Substance Use Topics   • Alcohol use: No   • Drug use: No       Allergies:  Allergies   Allergen Reactions   • Daptomycin Rash     Needed dialysis after using this medication   • Sulfa Antibiotics Swelling   • Amoxicillin Swelling       CURRENT MEDICATIONS      Previous Medications    ACETAMINOPHEN (TYLENOL) 500 MG TABLET    Take 1 tablet by mouth every 6 hours as needed for Pain    ALBUTEROL SULFATE HFA (PROVENTIL;VENTOLIN;PROAIR) 108 (90 BASE) MCG/ACT INHALER    Inhale 1 puff into the lungs every 4 hours as needed for Shortness of Breath    AMLODIPINE (NORVASC) 5 MG TABLET    Take 1 tablet by mouth daily    ASPIRIN 81 MG CHEWABLE TABLET    Take 1 tablet by mouth daily    B COMPLEX-C-FOLIC ACID (RENAL MULTIVITAMIN FORMULA) TABS    Take 1 tablet by mouth daily    BALSUM PERU-CASTOR OIL (VENELEX) OINT OINTMENT    Apply 0.087 g topically 2 times daily    BENZONATATE (TESSALON) 100 MG CAPSULE    Take 1 capsule by mouth 3 times daily as

## 2025-01-04 NOTE — ED NOTES
TRANSFER - OUT REPORT:    Verbal report given to AGNES Price on Marilee Oakes  being transferred to 2121 for routine progression of patient care       Report consisted of patient's Situation, Background, Assessment and   Recommendations(SBAR).     Information from the following report(s) Nurse Handoff Report, ED SBAR, Adult Overview, MAR, Recent Results, and Neuro Assessment was reviewed with the receiving nurse.    Clark Fall Assessment:    Presents to emergency department  because of falls (Syncope, seizure, or loss of consciousness): No  Age > 70: Yes  Altered Mental Status, Intoxication with alcohol or substance confusion (Disorientation, impaired judgment, poor safety awaremess, or inability to follow instructions): No  Impaired Mobility: Ambulates or transfers with assistive devices or assistance; Unable to ambulate or transer.: Yes  Nursing Judgement: Yes          Lines:   Peripheral IV 01/04/25 Left Forearm (Active)   Site Assessment Clean, dry & intact 01/04/25 1351   Line Status Flushed;Blood return noted;Specimen collected 01/04/25 1351   Line Care Chlorhexidine wipes 01/04/25 1351   Phlebitis Assessment No symptoms 01/04/25 1351   Infiltration Assessment 0 01/04/25 1351   Alcohol Cap Used No 01/04/25 1351   Dressing Status Clean, dry & intact 01/04/25 1351   Dressing Type Transparent 01/04/25 1351        Opportunity for questions and clarification was provided.

## 2025-01-05 LAB
ANION GAP SERPL CALC-SCNC: 6 MMOL/L (ref 2–12)
BASOPHILS # BLD: 0 K/UL (ref 0–0.1)
BASOPHILS NFR BLD: 0 % (ref 0–1)
BUN SERPL-MCNC: 18 MG/DL (ref 6–20)
BUN/CREAT SERPL: 12 (ref 12–20)
CALCIUM SERPL-MCNC: 9 MG/DL (ref 8.5–10.1)
CHLORIDE SERPL-SCNC: 106 MMOL/L (ref 97–108)
CO2 SERPL-SCNC: 30 MMOL/L (ref 21–32)
CREAT SERPL-MCNC: 1.56 MG/DL (ref 0.55–1.02)
DIFFERENTIAL METHOD BLD: ABNORMAL
EOSINOPHIL # BLD: 0.3 K/UL (ref 0–0.4)
EOSINOPHIL NFR BLD: 6 % (ref 0–7)
ERYTHROCYTE [DISTWIDTH] IN BLOOD BY AUTOMATED COUNT: 17.1 % (ref 11.5–14.5)
GLUCOSE BLD STRIP.AUTO-MCNC: 130 MG/DL (ref 65–117)
GLUCOSE BLD STRIP.AUTO-MCNC: 150 MG/DL (ref 65–117)
GLUCOSE BLD STRIP.AUTO-MCNC: 177 MG/DL (ref 65–117)
GLUCOSE BLD STRIP.AUTO-MCNC: 215 MG/DL (ref 65–117)
GLUCOSE SERPL-MCNC: 120 MG/DL (ref 65–100)
HCT VFR BLD AUTO: 34.1 % (ref 35–47)
HGB BLD-MCNC: 10.4 G/DL (ref 11.5–16)
IMM GRANULOCYTES # BLD AUTO: 0 K/UL (ref 0–0.04)
IMM GRANULOCYTES NFR BLD AUTO: 0 % (ref 0–0.5)
LYMPHOCYTES # BLD: 0.8 K/UL (ref 0.8–3.5)
LYMPHOCYTES NFR BLD: 15 % (ref 12–49)
MCH RBC QN AUTO: 26.9 PG (ref 26–34)
MCHC RBC AUTO-ENTMCNC: 30.5 G/DL (ref 30–36.5)
MCV RBC AUTO: 88.1 FL (ref 80–99)
MONOCYTES # BLD: 0.4 K/UL (ref 0–1)
MONOCYTES NFR BLD: 7 % (ref 5–13)
NEUTS SEG # BLD: 3.7 K/UL (ref 1.8–8)
NEUTS SEG NFR BLD: 72 % (ref 32–75)
NRBC # BLD: 0 K/UL (ref 0–0.01)
NRBC BLD-RTO: 0 PER 100 WBC
PLATELET # BLD AUTO: 222 K/UL (ref 150–400)
PMV BLD AUTO: 10.3 FL (ref 8.9–12.9)
POTASSIUM SERPL-SCNC: 3 MMOL/L (ref 3.5–5.1)
RBC # BLD AUTO: 3.87 M/UL (ref 3.8–5.2)
RBC MORPH BLD: ABNORMAL
SERVICE CMNT-IMP: ABNORMAL
SODIUM SERPL-SCNC: 142 MMOL/L (ref 136–145)
WBC # BLD AUTO: 5.2 K/UL (ref 3.6–11)

## 2025-01-05 PROCEDURE — 36415 COLL VENOUS BLD VENIPUNCTURE: CPT

## 2025-01-05 PROCEDURE — 2500000003 HC RX 250 WO HCPCS: Performed by: INTERNAL MEDICINE

## 2025-01-05 PROCEDURE — 6360000002 HC RX W HCPCS: Performed by: INTERNAL MEDICINE

## 2025-01-05 PROCEDURE — 82962 GLUCOSE BLOOD TEST: CPT

## 2025-01-05 PROCEDURE — 85025 COMPLETE CBC W/AUTO DIFF WBC: CPT

## 2025-01-05 PROCEDURE — 80048 BASIC METABOLIC PNL TOTAL CA: CPT

## 2025-01-05 PROCEDURE — 2700000000 HC OXYGEN THERAPY PER DAY

## 2025-01-05 PROCEDURE — 1100000003 HC PRIVATE W/ TELEMETRY

## 2025-01-05 PROCEDURE — 94640 AIRWAY INHALATION TREATMENT: CPT

## 2025-01-05 PROCEDURE — 6370000000 HC RX 637 (ALT 250 FOR IP): Performed by: NURSE PRACTITIONER

## 2025-01-05 PROCEDURE — 6370000000 HC RX 637 (ALT 250 FOR IP): Performed by: INTERNAL MEDICINE

## 2025-01-05 RX ORDER — POTASSIUM CHLORIDE 1500 MG/1
40 TABLET, EXTENDED RELEASE ORAL 2 TIMES DAILY
Status: DISCONTINUED | OUTPATIENT
Start: 2025-01-06 | End: 2025-01-08

## 2025-01-05 RX ORDER — ARFORMOTEROL TARTRATE 15 UG/2ML
15 SOLUTION RESPIRATORY (INHALATION)
Status: DISCONTINUED | OUTPATIENT
Start: 2025-01-05 | End: 2025-01-08 | Stop reason: HOSPADM

## 2025-01-05 RX ORDER — BUDESONIDE 0.25 MG/2ML
0.25 INHALANT ORAL
Status: DISCONTINUED | OUTPATIENT
Start: 2025-01-05 | End: 2025-01-08 | Stop reason: HOSPADM

## 2025-01-05 RX ADMIN — DOXYCYCLINE HYCLATE 100 MG: 100 TABLET, COATED ORAL at 10:13

## 2025-01-05 RX ADMIN — IPRATROPIUM BROMIDE 0.5 MG: 0.5 SOLUTION RESPIRATORY (INHALATION) at 14:41

## 2025-01-05 RX ADMIN — SODIUM CHLORIDE, PRESERVATIVE FREE 10 ML: 5 INJECTION INTRAVENOUS at 20:23

## 2025-01-05 RX ADMIN — MICONAZOLE NITRATE: 2 OINTMENT TOPICAL at 12:33

## 2025-01-05 RX ADMIN — ACETAMINOPHEN 650 MG: 325 TABLET ORAL at 20:19

## 2025-01-05 RX ADMIN — CARVEDILOL 25 MG: 12.5 TABLET, FILM COATED ORAL at 10:20

## 2025-01-05 RX ADMIN — PREGABALIN 75 MG: 75 CAPSULE ORAL at 20:20

## 2025-01-05 RX ADMIN — CARVEDILOL 25 MG: 12.5 TABLET, FILM COATED ORAL at 17:24

## 2025-01-05 RX ADMIN — ARFORMOTEROL TARTRATE 15 MCG: 15 SOLUTION RESPIRATORY (INHALATION) at 20:50

## 2025-01-05 RX ADMIN — SODIUM CHLORIDE, PRESERVATIVE FREE 10 ML: 5 INJECTION INTRAVENOUS at 10:16

## 2025-01-05 RX ADMIN — ISOSORBIDE MONONITRATE 60 MG: 30 TABLET, EXTENDED RELEASE ORAL at 10:20

## 2025-01-05 RX ADMIN — AMLODIPINE BESYLATE 5 MG: 5 TABLET ORAL at 10:20

## 2025-01-05 RX ADMIN — ARFORMOTEROL TARTRATE 15 MCG: 15 SOLUTION RESPIRATORY (INHALATION) at 08:37

## 2025-01-05 RX ADMIN — BUDESONIDE 250 MCG: 0.25 INHALANT RESPIRATORY (INHALATION) at 20:50

## 2025-01-05 RX ADMIN — OXYBUTYNIN CHLORIDE 5 MG: 5 TABLET ORAL at 20:20

## 2025-01-05 RX ADMIN — ASPIRIN 81 MG: 81 TABLET, CHEWABLE ORAL at 10:13

## 2025-01-05 RX ADMIN — INSULIN LISPRO 1 UNITS: 100 INJECTION, SOLUTION INTRAVENOUS; SUBCUTANEOUS at 12:33

## 2025-01-05 RX ADMIN — POTASSIUM CHLORIDE 20 MEQ: 1500 TABLET, EXTENDED RELEASE ORAL at 10:13

## 2025-01-05 RX ADMIN — MICONAZOLE NITRATE: 2 OINTMENT TOPICAL at 20:23

## 2025-01-05 RX ADMIN — IPRATROPIUM BROMIDE 0.5 MG: 0.5 SOLUTION RESPIRATORY (INHALATION) at 08:38

## 2025-01-05 RX ADMIN — POTASSIUM CHLORIDE 20 MEQ: 1500 TABLET, EXTENDED RELEASE ORAL at 20:19

## 2025-01-05 RX ADMIN — ENOXAPARIN SODIUM 30 MG: 100 INJECTION SUBCUTANEOUS at 20:17

## 2025-01-05 RX ADMIN — ENOXAPARIN SODIUM 30 MG: 100 INJECTION SUBCUTANEOUS at 10:17

## 2025-01-05 RX ADMIN — OXYBUTYNIN CHLORIDE 5 MG: 5 TABLET ORAL at 10:16

## 2025-01-05 RX ADMIN — PANTOPRAZOLE SODIUM 40 MG: 40 TABLET, DELAYED RELEASE ORAL at 10:16

## 2025-01-05 RX ADMIN — Medication 1000 UNITS: at 10:13

## 2025-01-05 RX ADMIN — FERROUS SULFATE TAB 325 MG (65 MG ELEMENTAL FE) 325 MG: 325 (65 FE) TAB at 10:13

## 2025-01-05 RX ADMIN — FUROSEMIDE 40 MG: 10 INJECTION, SOLUTION INTRAMUSCULAR; INTRAVENOUS at 17:24

## 2025-01-05 RX ADMIN — FUROSEMIDE 40 MG: 10 INJECTION, SOLUTION INTRAMUSCULAR; INTRAVENOUS at 10:19

## 2025-01-05 RX ADMIN — DOXYCYCLINE HYCLATE 100 MG: 100 TABLET, COATED ORAL at 20:20

## 2025-01-05 RX ADMIN — IPRATROPIUM BROMIDE 0.5 MG: 0.5 SOLUTION RESPIRATORY (INHALATION) at 20:50

## 2025-01-05 RX ADMIN — QUETIAPINE FUMARATE 25 MG: 25 TABLET ORAL at 20:19

## 2025-01-05 RX ADMIN — BUDESONIDE 250 MCG: 0.25 INHALANT RESPIRATORY (INHALATION) at 08:37

## 2025-01-05 ASSESSMENT — PAIN DESCRIPTION - DESCRIPTORS: DESCRIPTORS: ACHING

## 2025-01-05 ASSESSMENT — PAIN DESCRIPTION - ORIENTATION: ORIENTATION: LEFT

## 2025-01-05 ASSESSMENT — PAIN SCALES - GENERAL
PAINLEVEL_OUTOF10: 0
PAINLEVEL_OUTOF10: 3
PAINLEVEL_OUTOF10: 7

## 2025-01-05 NOTE — PROGRESS NOTES
Nursing contacted Nocturnist/cross cover provider via non-urgent messaging system [employer] and notified patient needs doxy and lyrica takes hs at home. On review of dayshift admitting note appears insulin low scale to be placed with A1c 5.8 last, pt has h/o reflux w/o present complaints. No other concerns reported. No acute distress reported. No other information provided by nurse. VSS.     Ordered am cbc, bmp, insulin ssi achs with accuchecks achs low scale per dayshift note and will avoid long acting insulin for now to try to avoid hypoglycemia, hypoglycemia protocol, pepcid 20mg bid, released orders from dayshift admitting that was placed on nursing behalf but did adjust the lyrica to HS dosing per pt takes this way at home. Will defer further evaluation/management to the day shift primary attending care team. Patient denies any further complaints or concerns.     Nursing to notify Hospitalist for further/continued concerns. Will remain available overnight for further concerns if nursing/patient needs. Please note, there are RRT systems in this hospital in place that if nursing has acute or critical patient condition change or concern, this is to help facilitate and notify that patient needs immediate bedside evaluation by a provider.     Non-billable note.

## 2025-01-05 NOTE — PROGRESS NOTES
0730: Bedside and Verbal shift change report given to Dee Iniguez RN (oncoming nurse) by AGNES Felton (offgoing nurse). Report included the following information Nurse Handoff Report, Index, Intake/Output, MAR, and Recent Results.      0834: Pts potassium was 4.2 with labs yesterday, dropped to 3 with AM labs. MD notified and supplement for K replacement requested.     1930: End of Shift Note    Bedside shift change report given to AGNES Og (oncoming nurse) by Dee Iniguez RN (offgoing nurse).  Report included the following information SBAR, Kardex, ED Summary, Intake/Output, MAR, and Recent Results    Shift worked:  5098-7007     Shift summary and any significant changes:     No significant changes     Concerns for physician to address:       Zone phone for oncoming shift:          Activity:  Level of Assistance: Moderate assist, patient does 50-74%  Number times ambulated in hallways past shift: 0  Number of times OOB to chair past shift: 1    Cardiac:   Cardiac Monitoring: Yes      Cardiac Rhythm: Ventricular paced    Access:  Current line(s): PIV     Genitourinary:   Urinary Status: Voiding, External catheter, Incontinent    Respiratory:   O2 Device: Nasal cannula  Chronic home O2 use?: YES  Incentive spirometer at bedside: NO    GI:  Last BM (including prior to admit): 01/05/25  Current diet:  ADULT DIET; Regular; Low Fat/Low Chol/High Fiber/2 gm Na; 1200 ml  Passing flatus: YES    Pain Management:   Patient states pain is manageable on current regimen: YES    Skin:  Oliver Scale Score: 17  Interventions: Wound Offloading (Prevention Methods): Bed, pressure reduction mattress, Pillows, Repositioning, Turning    Patient Safety:  Fall Risk: Nursing Judgement-Fall Risk High(Add Comments): Yes  Fall Risk Interventions  Nursing Judgement-Fall Risk High(Add Comments): Yes  Toilet Every 2 Hours-In Advance of Need: Yes  Hourly Visual Checks: Awake, In bed  Fall Visual Posted: Socks, Fall sign posted  Room

## 2025-01-05 NOTE — PLAN OF CARE
Problem: Safety - Adult  Goal: Free from fall injury  1/5/2025 1346 by Dee Iniguez RN  Outcome: Progressing  1/5/2025 0535 by Purnima Bolton RN  Outcome: Progressing     Problem: ABCDS Injury Assessment  Goal: Absence of physical injury  1/5/2025 1346 by Dee Iniguez RN  Outcome: Progressing  1/5/2025 0535 by Purnima Bolton RN  Outcome: Progressing

## 2025-01-05 NOTE — H&P
Child     Inflam Bowel Dis Child     COPD Brother     Hypertension Brother     High Cholesterol Brother     COPD Sister     Thyroid Disease Sister     Hypertension Sister     High Cholesterol Sister     Alcohol Abuse Father        Allergies   Allergen Reactions    Daptomycin Rash     Needed dialysis after using this medication    Sulfa Antibiotics Swelling    Amoxicillin Swelling        Prior to Admission medications    Medication Sig Start Date End Date Taking? Authorizing Provider   doxycycline hyclate (VIBRA-TABS) 100 MG tablet Take 1 tablet by mouth 2 times daily 1/1/25 1/31/25  Nneka Gregg PA-C   amLODIPine (NORVASC) 5 MG tablet Take 1 tablet by mouth daily    Angela Starkey MD   benzonatate (TESSALON) 100 MG capsule Take 1 capsule by mouth 3 times daily as needed for Cough    Angela Starkey MD   nystatin (MYCOSTATIN) 904983 UNIT/GM cream Apply topically 2 times daily Apply topically 2 times daily.    Angela Starkey MD   oxyBUTYnin (DITROPAN) 5 MG tablet Take 1 tablet by mouth 2 times daily    Angela Starkey MD   potassium chloride (KLOR-CON M) 20 MEQ extended release tablet Take 1 tablet by mouth 2 times daily 11/12/24   Darien Potter MD   QUEtiapine (SEROQUEL) 25 MG tablet Take 1 tablet by mouth nightly 11/5/24   Darien Potter MD   bumetanide (BUMEX) 1 MG tablet Take 1 tablet by mouth daily 10/31/24   Lazaro Nieves MD   omeprazole (PRILOSEC) 40 MG delayed release capsule Take 1 capsule by mouth daily 9/19/24 9/14/25  Darien Potter MD   pregabalin (LYRICA) 75 MG capsule Take 1 capsule by mouth daily for 360 days. Max Daily Amount: 75 mg 9/19/24 9/14/25  Darien Potter MD   carvedilol (COREG) 25 MG tablet Take 1 tablet by mouth with breakfast and with evening meal 9/19/24 9/14/25  Darien Potter MD   isosorbide mononitrate (IMDUR) 60 MG extended release tablet Take 1 tablet by mouth daily 9/19/24 9/14/25  Darien Potter MD  U/L    Alk Phosphatase 144 (H) 45 - 117 U/L    Total Protein 6.5 6.4 - 8.2 g/dL    Albumin 3.0 (L) 3.5 - 5.0 g/dL    Globulin 3.5 2.0 - 4.0 g/dL    Albumin/Globulin Ratio 0.9 (L) 1.1 - 2.2     Magnesium    Collection Time: 01/04/25  1:20 PM   Result Value Ref Range    Magnesium 1.5 (L) 1.6 - 2.4 mg/dL   Troponin “IF” patient is greater than 40 years of age or has a history of cardiac disease.    Collection Time: 01/04/25  1:20 PM   Result Value Ref Range    Troponin, High Sensitivity 25 0 - 51 ng/L   CBC with Auto Differential    Collection Time: 01/04/25  1:53 PM   Result Value Ref Range    WBC 5.7 3.6 - 11.0 K/uL    RBC 3.71 (L) 3.80 - 5.20 M/uL    Hemoglobin 10.1 (L) 11.5 - 16.0 g/dL    Hematocrit 33.1 (L) 35.0 - 47.0 %    MCV 89.2 80.0 - 99.0 FL    MCH 27.2 26.0 - 34.0 PG    MCHC 30.5 30.0 - 36.5 g/dL    RDW 16.8 (H) 11.5 - 14.5 %    Platelets 225 150 - 400 K/uL    MPV 10.2 8.9 - 12.9 FL    Nucleated RBCs 0.0 0  WBC    nRBC 0.00 0.00 - 0.01 K/uL    Neutrophils % 72 32 - 75 %    Lymphocytes % 15 12 - 49 %    Monocytes % 6 5 - 13 %    Eosinophils % 7 0 - 7 %    Basophils % 0 0 - 1 %    Immature Granulocytes % 0 0.0 - 0.5 %    Neutrophils Absolute 4.0 1.8 - 8.0 K/UL    Lymphocytes Absolute 0.8 0.8 - 3.5 K/UL    Monocytes Absolute 0.4 0.0 - 1.0 K/UL    Eosinophils Absolute 0.4 0.0 - 0.4 K/UL    Basophils Absolute 0.0 0.0 - 0.1 K/UL    Immature Granulocytes Absolute 0.0 0.00 - 0.04 K/UL    Differential Type AUTOMATED     COVID-19 & Influenza Combo    Collection Time: 01/04/25  2:04 PM    Specimen: Nasopharyngeal   Result Value Ref Range    Source Nasopharyngeal      SARS-CoV-2, PCR Not detected NOTD      Rapid Influenza A By PCR Not detected NOTD      Rapid Influenza B By PCR Not detected NOTD     Vascular duplex lower extremity venous bilateral    Collection Time: 01/04/25  2:30 PM   Result Value Ref Range    Body Surface Area 2.21 m2   Brain Natriuretic Peptide    Collection Time: 01/04/25  4:20 PM   Result

## 2025-01-05 NOTE — CARE COORDINATION
Care Management Initial Assessment       RUR: 36% High RUR  Readmission? Yes - 12/17/2024 - 12/24/2024 (7 days)  1st IM letter given? Yes - 01/04  1st  letter given: No     01/05/25 1535   Service Assessment   Patient Orientation Other (see comment)  (Wesley Oakes (Spouse) [phone] provided information)   Cognition Other (see comment)  (Wesley Oakes (Spouse) [phone] provided answers)   History Provided By Significant Other  (Wesley Oakes (Spouse)  [phone])   Primary Caregiver Family  (Wesley Oakes (Spouse) [phone])   Accompanied By/Relationship Wesley Oakes (Spouse) [phone]   Support Systems Spouse/Significant Other  (Wesley Oakes (Spouse) [phone])   Patient's Healthcare Decision Maker is: Named in Scanned ACP Document   PCP Verified by CM Yes   Last Visit to PCP Within last 3 months   Prior Functional Level Assistance with the following:;Shopping;Housework;Cooking   Current Functional Level Assistance with the following:;Cooking;Housework;Shopping   Can patient return to prior living arrangement Yes   Ability to make needs known: Good   Family able to assist with home care needs: Yes   Would you like for me to discuss the discharge plan with any other family members/significant others, and if so, who? Yes  (Wesley Oakes (Spouse) [phone])   Financial Resources Medicare   Community Resources None   CM/SW Referral Disease Management Education   Social/Functional History   Lives With Spouse  (Wesley Oakes (Spouse) [phone])   Type of Home House   Home Layout One level   Home Access Ramped entrance   Bathroom Equipment Commode   Home Equipment Walker - 4-Wheeled;Walker - Rolling;Rollator;Hospital bed   Active  No   Patient's  Info Wesley Oakes (Spouse) [phone]   Discharge Planning   Type of Residence House   Living Arrangements Spouse/Significant Other  (Wesley Oakes (Spouse) [phone])   Current Services Prior To Admission Home Care;Durable  Medical Equipment   Patient expects to be discharged to: House         Readmission Assessment  Number of Days since last admission?: 8-30 days (12/17/2024 - 12/24/2024 (7 days))  Previous Disposition: Home with Home Health  Who is being Interviewed: Caregiver (VioletteWesley (Spouse)  [phone])  What was the patient's/caregiver's perception as to why they think they needed to return back to the hospital?: Other (Comment) (VioletteWesley (Spouse)  states that she had too much fluid on her chest)  Did you visit your Primary Care Physician after you left the hospital, before you returned this time?: No  Why weren't you able to visit your PCP?: Other (Comment) (the patient was admitted prior to her appointment)  Did you see a specialist, such as Cardiac, Pulmonary, Orthopedic Physician, etc. after you left the hospital?: No  Who advised the patient to return to the hospital?: Self-referral  Does the patient report anything that got in the way of taking their medications?: No  In our efforts to provide the best possible care to you and others like you, can you think of anything that we could have done to help you after you left the hospital the first time, so that you might not have needed to return so soon?: (P) Additional Community resources available for illness support, Other (Comment) (VioletteWesley (Spouse)  states, \"My wife medications should have been evaluated closely. She was sent on less medication to manage her fluids.\")     01/05/25 1528   Readmission Assessment   Number of Days since last admission? 8-30 days  (12/17/2024 - 12/24/2024 (7 days))   Previous Disposition Home with Home Health   Who is being Interviewed Caregiver  (VioletteWesley (Spouse)  [phone])   What was the patient's/caregiver's perception as to why they think they needed to return back to the hospital? Other (Comment)  (Wesley Oakes (Spouse)  states that she had too much fluid on her chest)   Did you visit your Primary Care

## 2025-01-05 NOTE — FLOWSHEET NOTE
End of Shift Note    Bedside shift change report given to  AGNES Price  (oncoming nurse) by Purnima Bolton RN .        Shift worked:  Nights   Shift summary and any significant changes:    Patient denies SOB, rest comfortably overnight, O2 sat 98% on 2L NC       Concerns for physician to address:  None   Zone phone for oncoming shift:   [phone]     Patient Information  Marilee Oakes  74 y.o.  1/4/2025 12:33 PM by Gilbert Bradshaw MD. Marilee Oakes was admitted from [facility]    Problem List  Patient Active Problem List    Diagnosis Date Noted    ESRD (end stage renal disease) on dialysis (HCC) 05/20/2024    Coronary artery disease due to lipid rich plaque     Closed fracture of multiple ribs of right side with routine healing 08/31/2021    Ingrown toenail of left foot 12/17/2019    Lower extremity edema 12/17/2019    Venous stasis dermatitis of both lower extremities 12/17/2019    Obesity (BMI 30-39.9) 04/30/2019    Chronic cholecystitis 11/16/2018    Ischemic cardiomyopathy 11/13/2018    Stage 3a chronic kidney disease (HCC) 11/13/2018    Long-term use of high-risk medication 01/22/2018    Hypercholesterolemia 01/22/2018    Cellulitis of left lower extremity 06/06/2016    NOVOA (dyspnea on exertion) 01/04/2025    SOB (shortness of breath) 01/04/2025    Osteomyelitis 12/17/2024    Acute hematogenous osteomyelitis of left tibia 10/25/2024    Controlled diabetes mellitus type 2 with complications ([facility]) 10/25/2024    Neurogenic bladder disorder 09/19/2024    At high risk for falls 09/19/2024    Acute heart failure, unspecified heart failure type ([facility]) 08/29/2024    Acute on chronic diastolic heart failure ([facility]) 08/20/2024    Complicated UTI (urinary tract infection) 08/15/2024    S/P ankle arthrodesis 06/28/2024    Valgus foot deformity, acquired, left 06/25/2024    Urinary tract infection without hematuria 06/24/2024    Acute hemorrhoid 06/05/2024    Anemia 05/29/2024    Mitral regurgitation and aortic stenosis 05/20/2024     Unable to care for self 05/11/2024    Steroid-induced hyperglycemia 05/02/2024    Yeast UTI 04/27/2024    Chronic obstructive pulmonary disease (HCC) 04/27/2024    HILARY (acute kidney injury) ([facility]) 04/25/2024    Hardware complicating wound infection ([facility]) 04/10/2024    MRSA infection 04/07/2024    Surgical site infection 04/07/2024    Simple chronic bronchitis ([facility]) 04/07/2024    Closed bimalleolar fracture of left ankle 03/22/2024    Endocarditis 12/26/2023    MRSA bacteremia 12/21/2023    Ulcer of right foot with muscle involvement without evidence of necrosis ([facility]) 12/21/2023    Diabetic polyneuropathy associated with type 2 diabetes mellitus ([facility]) 12/21/2023    Poorly controlled diabetes mellitus ([facility]) 12/21/2023    Presence of combination internal cardiac defibrillator (ICD) and pacemaker 12/21/2023    Congestive heart failure ([facility]) 12/09/2023    Acute respiratory failure with hypoxia 12/08/2023    Intertriginous dermatitis associated with moisture 07/28/2023    Presence of Watchman left atrial appendage closure device 10/06/2021    Atrial fibrillation ([facility]) 10/06/2021    Polypharmacy 05/19/2021    Open wound of left lower leg 03/31/2021    Lethargy 01/01/2021    Type 2 diabetes mellitus with hyperglycemia, without long-term current use of insulin ([facility]) 04/30/2020    Acute exacerbation of COPD with asthma ([facility]) 08/17/2019    Asthma 01/31/2019    S/P placement of cardiac pacemaker 11/13/2018    Anxiety and depression 01/22/2018    Chronic atrial fibrillation ([facility]) 06/05/2016    Type 2 diabetes mellitus with diabetic neuropathy, without long-term current use of insulin ([facility]) 06/05/2016    Chronic systolic heart failure ([facility]) 06/05/2016    Recurrent infections 06/04/2016    Essential hypertension 02/22/2013    IBS (irritable bowel syndrome) 02/22/2013    Spinal stenosis, lumbar region, without neurogenic claudication 02/22/2013    B12 deficiency 02/22/2013    Right knee DJD 02/22/2013    Ataxia 02/22/2013     Past

## 2025-01-06 ENCOUNTER — TELEPHONE (OUTPATIENT)
Age: 75
End: 2025-01-06

## 2025-01-06 LAB
EKG ATRIAL RATE: 78 BPM
EKG DIAGNOSIS: NORMAL
EKG Q-T INTERVAL: 450 MS
EKG QRS DURATION: 148 MS
EKG QTC CALCULATION (BAZETT): 519 MS
EKG R AXIS: 262 DEGREES
EKG T AXIS: 70 DEGREES
EKG VENTRICULAR RATE: 80 BPM
GLUCOSE BLD STRIP.AUTO-MCNC: 117 MG/DL (ref 65–117)
GLUCOSE BLD STRIP.AUTO-MCNC: 146 MG/DL (ref 65–117)
GLUCOSE BLD STRIP.AUTO-MCNC: 209 MG/DL (ref 65–117)
GLUCOSE BLD STRIP.AUTO-MCNC: 272 MG/DL (ref 65–117)
SERVICE CMNT-IMP: ABNORMAL
SERVICE CMNT-IMP: NORMAL

## 2025-01-06 PROCEDURE — 6370000000 HC RX 637 (ALT 250 FOR IP): Performed by: NURSE PRACTITIONER

## 2025-01-06 PROCEDURE — 6360000002 HC RX W HCPCS: Performed by: INTERNAL MEDICINE

## 2025-01-06 PROCEDURE — 2500000003 HC RX 250 WO HCPCS: Performed by: INTERNAL MEDICINE

## 2025-01-06 PROCEDURE — 2700000000 HC OXYGEN THERAPY PER DAY

## 2025-01-06 PROCEDURE — 82962 GLUCOSE BLOOD TEST: CPT

## 2025-01-06 PROCEDURE — 6370000000 HC RX 637 (ALT 250 FOR IP): Performed by: INTERNAL MEDICINE

## 2025-01-06 PROCEDURE — 94640 AIRWAY INHALATION TREATMENT: CPT

## 2025-01-06 PROCEDURE — 1100000003 HC PRIVATE W/ TELEMETRY

## 2025-01-06 RX ADMIN — MICONAZOLE NITRATE: 2 OINTMENT TOPICAL at 20:55

## 2025-01-06 RX ADMIN — QUETIAPINE FUMARATE 25 MG: 25 TABLET ORAL at 20:53

## 2025-01-06 RX ADMIN — ENOXAPARIN SODIUM 30 MG: 100 INJECTION SUBCUTANEOUS at 08:40

## 2025-01-06 RX ADMIN — CARVEDILOL 25 MG: 12.5 TABLET, FILM COATED ORAL at 08:40

## 2025-01-06 RX ADMIN — IPRATROPIUM BROMIDE 0.5 MG: 0.5 SOLUTION RESPIRATORY (INHALATION) at 14:21

## 2025-01-06 RX ADMIN — BUDESONIDE 250 MCG: 0.25 INHALANT RESPIRATORY (INHALATION) at 21:09

## 2025-01-06 RX ADMIN — FERROUS SULFATE TAB 325 MG (65 MG ELEMENTAL FE) 325 MG: 325 (65 FE) TAB at 08:39

## 2025-01-06 RX ADMIN — ASPIRIN 81 MG: 81 TABLET, CHEWABLE ORAL at 08:40

## 2025-01-06 RX ADMIN — MICONAZOLE NITRATE: 2 OINTMENT TOPICAL at 10:25

## 2025-01-06 RX ADMIN — PREGABALIN 75 MG: 75 CAPSULE ORAL at 20:53

## 2025-01-06 RX ADMIN — ENOXAPARIN SODIUM 30 MG: 100 INJECTION SUBCUTANEOUS at 20:55

## 2025-01-06 RX ADMIN — OXYBUTYNIN CHLORIDE 5 MG: 5 TABLET ORAL at 20:53

## 2025-01-06 RX ADMIN — SODIUM CHLORIDE, PRESERVATIVE FREE 10 ML: 5 INJECTION INTRAVENOUS at 08:46

## 2025-01-06 RX ADMIN — DOXYCYCLINE HYCLATE 100 MG: 100 TABLET, COATED ORAL at 08:39

## 2025-01-06 RX ADMIN — POTASSIUM CHLORIDE 40 MEQ: 1500 TABLET, EXTENDED RELEASE ORAL at 08:40

## 2025-01-06 RX ADMIN — INSULIN LISPRO 2 UNITS: 100 INJECTION, SOLUTION INTRAVENOUS; SUBCUTANEOUS at 12:00

## 2025-01-06 RX ADMIN — DOXYCYCLINE HYCLATE 100 MG: 100 TABLET, COATED ORAL at 20:54

## 2025-01-06 RX ADMIN — INSULIN LISPRO 1 UNITS: 100 INJECTION, SOLUTION INTRAVENOUS; SUBCUTANEOUS at 20:55

## 2025-01-06 RX ADMIN — CARVEDILOL 25 MG: 12.5 TABLET, FILM COATED ORAL at 17:42

## 2025-01-06 RX ADMIN — SODIUM CHLORIDE, PRESERVATIVE FREE 10 ML: 5 INJECTION INTRAVENOUS at 20:55

## 2025-01-06 RX ADMIN — FUROSEMIDE 40 MG: 10 INJECTION, SOLUTION INTRAMUSCULAR; INTRAVENOUS at 08:39

## 2025-01-06 RX ADMIN — ISOSORBIDE MONONITRATE 60 MG: 30 TABLET, EXTENDED RELEASE ORAL at 08:39

## 2025-01-06 RX ADMIN — Medication 1000 UNITS: at 08:40

## 2025-01-06 RX ADMIN — POTASSIUM CHLORIDE 40 MEQ: 1500 TABLET, EXTENDED RELEASE ORAL at 20:53

## 2025-01-06 RX ADMIN — ARFORMOTEROL TARTRATE 15 MCG: 15 SOLUTION RESPIRATORY (INHALATION) at 21:09

## 2025-01-06 RX ADMIN — IPRATROPIUM BROMIDE 0.5 MG: 0.5 SOLUTION RESPIRATORY (INHALATION) at 21:03

## 2025-01-06 RX ADMIN — OXYBUTYNIN CHLORIDE 5 MG: 5 TABLET ORAL at 08:40

## 2025-01-06 RX ADMIN — PANTOPRAZOLE SODIUM 40 MG: 40 TABLET, DELAYED RELEASE ORAL at 06:17

## 2025-01-06 RX ADMIN — AMLODIPINE BESYLATE 5 MG: 5 TABLET ORAL at 08:39

## 2025-01-06 RX ADMIN — FUROSEMIDE 40 MG: 10 INJECTION, SOLUTION INTRAMUSCULAR; INTRAVENOUS at 17:42

## 2025-01-06 ASSESSMENT — PAIN SCALES - GENERAL
PAINLEVEL_OUTOF10: 0
PAINLEVEL_OUTOF10: 0
PAINLEVEL_OUTOF10: 2

## 2025-01-06 NOTE — PROGRESS NOTES
Hospitalist Progress Note    NAME:   Marilee Oakes   : 1950   MRN: [id number]     Date/Time: 2025 11:20 PM  Patient PCP: Darien Potter MD    Estimated discharge date:  Barriers:       Assessment / Plan:    Acute on chronic hypoxic respiratory failure  -Most likely secondary to CHF exacerbation acute on chronic diastolic apparently her nephrologist cut down the dose of Bumex in November  -   CTA  infiltrates/large pleural effusion or PE  - COVID/flu negative  - respiratory panel is pending   - Lasix 40 mg bid  - Strict JENNIFER's  - Daily weight  -Hypokalemia replaced     History of diabetes  -Diet diabetic diet  - Most recent A1c was 5.8   History of diastolic heart failure/A-fib/morbid obesity/status post Watchman device  - Continue carvedilol  -Status post PPM, currently medically ventricular paced     History of complicated left lower extremity osteomyelitis/wound infection  Hardware infection  Continue doxycycline     History of COPD oxygen dependent  - Continue home inhaler no wheezing on exam             Medical Decision Making:   I personally reviewed labs:  I personally reviewed imaging:  I personally reviewed EKG:  Toxic drug monitoring:   Discussed case with:         Code Status:   DVT Prophylaxis:   GI Prophylaxis:    Subjective:     Chief Complaint / Reason for Physician Visit  Discussed with RN events overnight.       Objective:     VITALS:   Last 24hrs VS reviewed since prior progress note. Most recent are:  Patient Vitals for the past 24 hrs:   BP Temp Temp src Pulse Resp SpO2 Weight   25 -- -- -- -- 17 96 % --   25 -- -- -- -- 20 96 % --   25 1945 -- 97.7 °F (36.5 °C) Oral -- 16 -- --   25 1724 (!) 143/87 -- -- 80 -- -- --   25 1520 126/65 98.2 °F (36.8 °C) Oral 88 22 96 % --   25 1120 (!) 149/85 -- Oral 88 18 -- --   25 1025 (!) 144/107 -- -- 85 -- -- --   25 1019 (!) 144/107 -- -- 96 -- -- --   25 0755 (!) 158/94

## 2025-01-06 NOTE — PROGRESS NOTES
End of Shift Note    Bedside shift change report given to Lali (oncoming nurse) by Idalia Sandoval RN (offgoing nurse).  Report included the following information SBAR, Kardex, and MAR    Shift worked:  7p-7a     Shift summary and any significant changes:     Scheduled medications were given, see MAR.  Humalog was held due to the patients blood glucose level, see MAR.  IV's have been flushed and are patent.  Patient was given Tylenol once, see PRN MAR.  Patient teaching and routine rounding has been done.     Concerns for physician to address:       Zone phone for oncoming shift:          Activity:  Level of Assistance: Moderate assist, patient does 50-74%  Number times ambulated in hallways past shift: 0  Number of times OOB to chair past shift: 0    Cardiac:   Cardiac Monitoring: Yes      Cardiac Rhythm: Ventricular paced    Access:  Current line(s): PIV     Genitourinary:   Urinary Status: Voiding, External catheter    Respiratory:   O2 Device: Nasal cannula  Chronic home O2 use?: YES  Incentive spirometer at bedside: NO    GI:  Last BM (including prior to admit): 01/05/25  Current diet:  ADULT DIET; Regular; Low Fat/Low Chol/High Fiber/2 gm Na; 1200 ml  Passing flatus: YES    Pain Management:   Patient states pain is manageable on current regimen: YES    Skin:  Oliver Scale Score: 17  Interventions: Wound Offloading (Prevention Methods): Blankets, Pillows, Repositioning, Turning    Patient Safety:  Fall Risk: Nursing Judgement-Fall Risk High(Add Comments): Yes  Fall Risk Interventions  Nursing Judgement-Fall Risk High(Add Comments): Yes  Toilet Every 2 Hours-In Advance of Need: Yes  Hourly Visual Checks: Awake, In bed  Fall Visual Posted: Socks, Fall sign posted, Armband  Room Door Open: No (Comment)  Alarm On: Bed, Chair  Patient Moved Closer to Nursing Station: No    Active Consults:   IP CONSULT TO CARDIOLOGY    Length of Stay:  Expected LOS: 2  Actual LOS: 2    Idalia Sandoval  RN

## 2025-01-06 NOTE — CONSULTS
Apply topically 2 times daily.   Yes Angela Starkey MD   QUEtiapine (SEROQUEL) 25 MG tablet Take 1 tablet by mouth nightly 11/5/24  Yes Darien Potter MD   bumetanide (BUMEX) 1 MG tablet Take 1 tablet by mouth daily 10/31/24  Yes Lazaro Nieves MD   omeprazole (PRILOSEC) 40 MG delayed release capsule Take 1 capsule by mouth daily 9/19/24 9/14/25 Yes Darien Potter MD   pregabalin (LYRICA) 75 MG capsule Take 1 capsule by mouth daily for 360 days. Max Daily Amount: 75 mg 9/19/24 9/14/25 Yes Darien Potter MD   carvedilol (COREG) 25 MG tablet Take 1 tablet by mouth with breakfast and with evening meal 9/19/24 9/14/25 Yes Darien Potter MD   isosorbide mononitrate (IMDUR) 60 MG extended release tablet Take 1 tablet by mouth daily 9/19/24 9/14/25 Yes Darien Potter MD   albuterol sulfate HFA (PROVENTIL;VENTOLIN;PROAIR) 108 (90 Base) MCG/ACT inhaler Inhale 1 puff into the lungs every 4 hours as needed for Shortness of Breath 9/19/24 9/14/25 Yes Darien Potter MD   ondansetron (ZOFRAN-ODT) 4 MG disintegrating tablet Take 1 tablet by mouth 3 times daily as needed for Nausea or Vomiting 8/11/24  Yes Levy Dalton MD   aspirin 81 MG chewable tablet Take 1 tablet by mouth daily 7/8/24  Yes Bel Pereira APRN - CNP   acetaminophen (TYLENOL) 500 MG tablet Take 1 tablet by mouth every 6 hours as needed for Pain 7/8/24  Yes Bel Pereira APRN - CNP   balsum peru-castor oil (VENELEX) OINT ointment Apply 0.087 g topically 2 times daily 7/8/24  Yes Bel Pereira APRN - CNP   vitamin D 25 MCG (1000 UT) CAPS Take 1 capsule by mouth daily 7/8/24  Yes Pereira, Bel A, APRN - CNP   benzonatate (TESSALON) 100 MG capsule Take 1 capsule by mouth 3 times daily as needed for Cough    ProviderAngela MD   oxyBUTYnin (DITROPAN) 5 MG tablet Take 1 tablet by mouth 2 times daily    ProviderAngela MD   potassium chloride (KLOR-CON M) 20 MEQ extended release tablet Take  dermatitis of lower extremities.   Neuro: Alert and oriented x 3;  Grossly non-focal. Normal mood and affect.     Labs:   Recent Results (from the past 24 hour(s))   POCT Glucose    Collection Time: 01/05/25  4:32 PM   Result Value Ref Range    POC Glucose 150 (H) 65 - 117 mg/dL    Performed by: DANIEL LAW PCT    POCT Glucose    Collection Time: 01/05/25  8:37 PM   Result Value Ref Range    POC Glucose 177 (H) 65 - 117 mg/dL    Performed by: Adilene Mata (CON)    POCT Glucose    Collection Time: 01/06/25  7:55 AM   Result Value Ref Range    POC Glucose 117 65 - 117 mg/dL    Performed by: Chandu Vidales PCT    POCT Glucose    Collection Time: 01/06/25 11:06 AM   Result Value Ref Range    POC Glucose 272 (H) 65 - 117 mg/dL    Performed by: Chandu Vidales PCT      No results for input(s): \"CPK\", \"CKMB\" in the last 72 hours.    Invalid input(s): \"CKNDX\", \"TROIQ\"    Recent Labs     01/04/25  1320 01/04/25  1353 01/05/25  0446     --  142   K 4.2  --  3.0*   *  --  106   CO2 25  --  30   BUN 19  --  18   WBC  --  5.7 5.2   HGB  --  10.1* 10.4*   HCT  --  33.1* 34.1*   PLT  --  225 222       Bruce Abbott MD

## 2025-01-06 NOTE — TELEPHONE ENCOUNTER
[phone] - Patient is being discharged today and needs an appointment for a hospital f/u. She was admitted on 1/4 for the following: NOVOA, acute respiratory failure and heart failure.

## 2025-01-06 NOTE — PROGRESS NOTES
0715- Report received from AGNES Nichols  0815- Vital signs obtained, incontinence care provided, new purewick placed, linens changed and patient placed in the chair, chair alarm engaged.  0840- Medications given  0900- Report given to AGNES Rosales

## 2025-01-06 NOTE — PROGRESS NOTES
Attempted to schedule hospital follow up PCP appointment. Office  will contact the patient with appointment information per office protocol. [facility] placed [facility] information AVS for patient resource. Pending patient discharge. Laura Chu, Care Management Assistant

## 2025-01-06 NOTE — PLAN OF CARE
Problem: Metabolic/Fluid and Electrolytes - Adult  Goal: Glucose maintained within prescribed range  Outcome: Progressing     Problem: Respiratory - Adult  Goal: Achieves optimal ventilation and oxygenation  1/6/2025 1318 by Connie Colon RN  Outcome: Progressing     Problem: Safety - Adult  Goal: Free from fall injury  1/6/2025 1318 by Connie Colon RN  Outcome: Progressing     Problem: Chronic Conditions and Co-morbidities  Goal: Patient's chronic conditions and co-morbidity symptoms are monitored and maintained or improved  1/6/2025 1318 by Connie Colon RN  Outcome: Progressing     Problem: Discharge Planning  Goal: Discharge to home or other facility with appropriate resources  1/6/2025 1318 by Connie Colon RN  Outcome: Progressing

## 2025-01-06 NOTE — PROGRESS NOTES
Attempted to schedule hospital follow up CARDIO appointment. Office is closed DUE TO WEATHER. Pending patient discharge. Laura Chu, Care Management Assistant

## 2025-01-06 NOTE — PLAN OF CARE
Problem: Respiratory - Adult  Goal: Achieves optimal ventilation and oxygenation  Outcome: Progressing  Flowsheets (Taken 1/5/2025 0755 by Dee Iniguez RN)  Achieves optimal ventilation and oxygenation: Assess for changes in respiratory status

## 2025-01-06 NOTE — CARE COORDINATION
1533 - Message sent to attending to discuss code status with patient (if not already done) due to patient having an old POST on file.      Vera Troncoso, MANUELAN, RN    Care Management  [phone]

## 2025-01-06 NOTE — PLAN OF CARE
Problem: Chronic Conditions and Co-morbidities  Goal: Patient's chronic conditions and co-morbidity symptoms are monitored and maintained or improved  Outcome: Progressing     Problem: Discharge Planning  Goal: Discharge to home or other facility with appropriate resources  Outcome: Progressing     Problem: Pain  Goal: Verbalizes/displays adequate comfort level or baseline comfort level  Outcome: Progressing  Flowsheets (Taken 1/5/2025 1520 by Dee Iniguez RN)  Verbalizes/displays adequate comfort level or baseline comfort level: Encourage patient to monitor pain and request assistance     Problem: Skin/Tissue Integrity  Goal: Absence of new skin breakdown  Description: 1.  Monitor for areas of redness and/or skin breakdown  2.  Assess vascular access sites hourly  3.  Every 4-6 hours minimum:  Change oxygen saturation probe site  4.  Every 4-6 hours:  If on nasal continuous positive airway pressure, respiratory therapy assess nares and determine need for appliance change or resting period.  Outcome: Progressing     Problem: Safety - Adult  Goal: Free from fall injury  1/6/2025 0328 by Idalia Sandoval RN  Outcome: Progressing  1/5/2025 1346 by Dee Iniguez RN  Outcome: Progressing     Problem: ABCDS Injury Assessment  Goal: Absence of physical injury  1/6/2025 0328 by Idalia Sandoval RN  Outcome: Progressing  1/5/2025 1346 by Dee Iniguez RN  Outcome: Progressing     Problem: Respiratory - Adult  Goal: Achieves optimal ventilation and oxygenation  1/6/2025 0328 by Idalia Sandoval RN  Outcome: Progressing  1/5/2025 2054 by Jo Ann Oakes RCP  Outcome: Progressing  Flowsheets (Taken 1/5/2025 0755 by Dee Iniguez RN)  Achieves optimal ventilation and oxygenation: Assess for changes in respiratory status

## 2025-01-07 LAB
ANION GAP SERPL CALC-SCNC: 5 MMOL/L (ref 2–12)
BUN SERPL-MCNC: 27 MG/DL (ref 6–20)
BUN/CREAT SERPL: 14 (ref 12–20)
CALCIUM SERPL-MCNC: 8.9 MG/DL (ref 8.5–10.1)
CHLORIDE SERPL-SCNC: 106 MMOL/L (ref 97–108)
CO2 SERPL-SCNC: 32 MMOL/L (ref 21–32)
CREAT SERPL-MCNC: 1.92 MG/DL (ref 0.55–1.02)
GLUCOSE BLD STRIP.AUTO-MCNC: 116 MG/DL (ref 65–117)
GLUCOSE BLD STRIP.AUTO-MCNC: 153 MG/DL (ref 65–117)
GLUCOSE BLD STRIP.AUTO-MCNC: 154 MG/DL (ref 65–117)
GLUCOSE SERPL-MCNC: 134 MG/DL (ref 65–100)
POTASSIUM SERPL-SCNC: 3.9 MMOL/L (ref 3.5–5.1)
SERVICE CMNT-IMP: ABNORMAL
SERVICE CMNT-IMP: ABNORMAL
SERVICE CMNT-IMP: NORMAL
SODIUM SERPL-SCNC: 143 MMOL/L (ref 136–145)

## 2025-01-07 PROCEDURE — 36415 COLL VENOUS BLD VENIPUNCTURE: CPT

## 2025-01-07 PROCEDURE — 80048 BASIC METABOLIC PNL TOTAL CA: CPT

## 2025-01-07 PROCEDURE — 6360000002 HC RX W HCPCS: Performed by: INTERNAL MEDICINE

## 2025-01-07 PROCEDURE — 1100000003 HC PRIVATE W/ TELEMETRY

## 2025-01-07 PROCEDURE — 82962 GLUCOSE BLOOD TEST: CPT

## 2025-01-07 PROCEDURE — 6370000000 HC RX 637 (ALT 250 FOR IP): Performed by: INTERNAL MEDICINE

## 2025-01-07 PROCEDURE — 94640 AIRWAY INHALATION TREATMENT: CPT

## 2025-01-07 PROCEDURE — 2500000003 HC RX 250 WO HCPCS: Performed by: INTERNAL MEDICINE

## 2025-01-07 PROCEDURE — 2700000000 HC OXYGEN THERAPY PER DAY

## 2025-01-07 PROCEDURE — 6370000000 HC RX 637 (ALT 250 FOR IP): Performed by: NURSE PRACTITIONER

## 2025-01-07 RX ADMIN — MICONAZOLE NITRATE: 2 OINTMENT TOPICAL at 21:34

## 2025-01-07 RX ADMIN — DOXYCYCLINE HYCLATE 100 MG: 100 TABLET, COATED ORAL at 09:52

## 2025-01-07 RX ADMIN — POTASSIUM CHLORIDE 40 MEQ: 1500 TABLET, EXTENDED RELEASE ORAL at 21:30

## 2025-01-07 RX ADMIN — ISOSORBIDE MONONITRATE 60 MG: 30 TABLET, EXTENDED RELEASE ORAL at 09:52

## 2025-01-07 RX ADMIN — ENOXAPARIN SODIUM 30 MG: 100 INJECTION SUBCUTANEOUS at 21:30

## 2025-01-07 RX ADMIN — BUDESONIDE 250 MCG: 0.25 INHALANT RESPIRATORY (INHALATION) at 20:24

## 2025-01-07 RX ADMIN — IPRATROPIUM BROMIDE 0.5 MG: 0.5 SOLUTION RESPIRATORY (INHALATION) at 07:53

## 2025-01-07 RX ADMIN — IPRATROPIUM BROMIDE 0.5 MG: 0.5 SOLUTION RESPIRATORY (INHALATION) at 20:24

## 2025-01-07 RX ADMIN — OXYBUTYNIN CHLORIDE 5 MG: 5 TABLET ORAL at 09:52

## 2025-01-07 RX ADMIN — OXYBUTYNIN CHLORIDE 5 MG: 5 TABLET ORAL at 21:30

## 2025-01-07 RX ADMIN — SODIUM CHLORIDE, PRESERVATIVE FREE 10 ML: 5 INJECTION INTRAVENOUS at 21:33

## 2025-01-07 RX ADMIN — DOXYCYCLINE HYCLATE 100 MG: 100 TABLET, COATED ORAL at 21:30

## 2025-01-07 RX ADMIN — SODIUM CHLORIDE, PRESERVATIVE FREE 10 ML: 5 INJECTION INTRAVENOUS at 09:51

## 2025-01-07 RX ADMIN — Medication 1000 UNITS: at 09:52

## 2025-01-07 RX ADMIN — ARFORMOTEROL TARTRATE 15 MCG: 15 SOLUTION RESPIRATORY (INHALATION) at 20:24

## 2025-01-07 RX ADMIN — ASPIRIN 81 MG: 81 TABLET, CHEWABLE ORAL at 09:53

## 2025-01-07 RX ADMIN — FERROUS SULFATE TAB 325 MG (65 MG ELEMENTAL FE) 325 MG: 325 (65 FE) TAB at 09:52

## 2025-01-07 RX ADMIN — AMLODIPINE BESYLATE 5 MG: 5 TABLET ORAL at 09:53

## 2025-01-07 RX ADMIN — ARFORMOTEROL TARTRATE 15 MCG: 15 SOLUTION RESPIRATORY (INHALATION) at 07:58

## 2025-01-07 RX ADMIN — PREGABALIN 75 MG: 75 CAPSULE ORAL at 21:30

## 2025-01-07 RX ADMIN — CARVEDILOL 25 MG: 12.5 TABLET, FILM COATED ORAL at 17:13

## 2025-01-07 RX ADMIN — ACETAMINOPHEN 650 MG: 325 TABLET ORAL at 00:50

## 2025-01-07 RX ADMIN — CARVEDILOL 25 MG: 12.5 TABLET, FILM COATED ORAL at 09:53

## 2025-01-07 RX ADMIN — PANTOPRAZOLE SODIUM 40 MG: 40 TABLET, DELAYED RELEASE ORAL at 06:04

## 2025-01-07 RX ADMIN — IPRATROPIUM BROMIDE 0.5 MG: 0.5 SOLUTION RESPIRATORY (INHALATION) at 14:59

## 2025-01-07 RX ADMIN — QUETIAPINE FUMARATE 25 MG: 25 TABLET ORAL at 21:30

## 2025-01-07 RX ADMIN — BUDESONIDE 250 MCG: 0.25 INHALANT RESPIRATORY (INHALATION) at 07:58

## 2025-01-07 RX ADMIN — MICONAZOLE NITRATE: 2 OINTMENT TOPICAL at 09:53

## 2025-01-07 RX ADMIN — ENOXAPARIN SODIUM 30 MG: 100 INJECTION SUBCUTANEOUS at 09:52

## 2025-01-07 ASSESSMENT — PAIN DESCRIPTION - ORIENTATION: ORIENTATION: LEFT;RIGHT

## 2025-01-07 ASSESSMENT — PAIN DESCRIPTION - LOCATION: LOCATION: BUTTOCKS

## 2025-01-07 ASSESSMENT — PAIN DESCRIPTION - DESCRIPTORS: DESCRIPTORS: ACHING

## 2025-01-07 NOTE — PROGRESS NOTES
End of Shift Note    Bedside shift change report given to AGNES Friedman (oncoming nurse) by Connie Colon RN (offgoing nurse).  Report included the following information SBAR, Intake/Output, MAR, Accordion, Recent Results, and Cardiac Rhythm NSR    Shift worked:  1958-0148     Shift summary and any significant changes:     Pt remains alert, VSS on 2L O2, down to 1L this pm. Meds as ordered. Up in chair most of shift. No acute issues this shift.     Concerns for physician to address:       Zone phone for oncoming shift:          Activity:  Level of Assistance: Moderate assist, patient does 50-74%  Number times ambulated in hallways past shift: 0  Number of times OOB to chair past shift: 2    Cardiac:   Cardiac Monitoring: Yes      Cardiac Rhythm: Ventricular paced    Access:  Current line(s): PIV     Genitourinary:   Urinary Status: Voiding, External catheter    Respiratory:   O2 Device: Nasal cannula  Chronic home O2 use?: YES  Incentive spirometer at bedside: NO    GI:  Last BM (including prior to admit): 01/06/25  Current diet:  ADULT DIET; Regular; Low Fat/Low Chol/High Fiber/2 gm Na; 1200 ml  Passing flatus: YES    Pain Management:   Patient states pain is manageable on current regimen: YES    Skin:  Oliver Scale Score: 18  Interventions: Wound Offloading (Prevention Methods): Elevate heels, Pillows, Repositioning    Patient Safety:  Fall Risk: Nursing Judgement-Fall Risk High(Add Comments): Yes  Fall Risk Interventions  Nursing Judgement-Fall Risk High(Add Comments): Yes  Toilet Every 2 Hours-In Advance of Need: Yes  Hourly Visual Checks: Awake  Fall Visual Posted: Socks, Fall sign posted, Armband  Room Door Open: No (Comment)  Alarm On: Bed, Chair  Patient Moved Closer to Nursing Station: No    Active Consults:   IP CONSULT TO CARDIOLOGY    Length of Stay:  Expected LOS: 4  Actual LOS: 2    Connie Colon RN

## 2025-01-07 NOTE — PROGRESS NOTES
Hospitalist Progress Note    NAME:   Marilee Oakes   : 1950   MRN: [id number]     Date/Time: 2025 1:09 AM  Patient PCP: Darien Potter MD    Estimated discharge date:  Barriers:       Assessment / Plan:    Acute on chronic hypoxic respiratory failure  -Most likely secondary to CHF exacerbation acute on chronic diastolic apparently her nephrologist cut down the dose of Bumex in November  -   CTA  infiltrates/large pleural effusion or PE  - COVID/flu negative  - respiratory panel is pending   - Lasix 40 mg bid  - Strict JENNIFER's  - Daily weight  -Hypokalemia replaced     History of diabetes  -Diet diabetic diet  - Most recent A1c was 5.8   History of diastolic heart failure/A-fib/morbid obesity/status post Watchman device  - Continue carvedilol  -Status post PPM, currently medically ventricular paced     History of complicated left lower extremity osteomyelitis/wound infection  Hardware infection  Continue doxycycline     History of COPD oxygen dependent  - Continue home inhaler no wheezing on exam             Medical Decision Making:   I personally reviewed labs:  I personally reviewed imaging:  I personally reviewed EKG:  Toxic drug monitoring:   Discussed case with:         Code Status:   DVT Prophylaxis:   GI Prophylaxis:    Subjective:     Chief Complaint / Reason for Physician Visit  Discussed with RN events overnight.       Objective:     VITALS:   Last 24hrs VS reviewed since prior progress note. Most recent are:  Patient Vitals for the past 24 hrs:   BP Temp Temp src Pulse Resp SpO2   25 -- 97.7 °F (36.5 °C) Axillary -- 16 93 %   25 1742 134/76 -- -- 80 -- --   25 1532 129/74 98 °F (36.7 °C) Oral -- 18 98 %   25 1531 -- -- -- 80 -- --   25 1145 132/73 97.9 °F (36.6 °C) Oral 80 20 98 %   25 0915 -- -- -- 80 -- --   25 0815 -- 98 °F (36.7 °C) -- -- 18 --   25 0245 -- 97.8 °F (36.6 °C) Oral -- 18 98 %         Intake/Output Summary (Last 24

## 2025-01-07 NOTE — PROGRESS NOTES
Cardiology Progress Note  1/7/2025     Admit Date: 1/4/2025  Admit Diagnosis: NOVOA (dyspnea on exertion) [R06.09]  SOB (shortness of breath) [R06.02]  CC: none currently  Cardiologist:  Dr Abbott.       Cardiac Assessment/Plan:    1) CAD (Prox LAD KAY 4/2014 for NQWMI), Neg PET for ischemia 9/2018. Min CAD 2/2019.  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  Entresto was too expensive.          *EF 15-20% 9/2018.  EF 30% w/ minimal CAD at cath 2/2019. EF 20-25% (m-mod MR; mod TR) 8/2019.          *EF 35-40% 8/2020 (admit for CHF: too much salt).         *EF 40-45% 2/2021 (@ [facility] for CHF, off some meds after kyphoplasty/low BPs)          *EF 50% 1/2022 (AS mild w/ mean 16).          *Nl EF 12/2024.  3) HTN,   4) AFIB: PAfib (RFA 4/2016 and 1/2017), Recurrent/persistent afib 2017/2018: AV node ablation 10/2018.        *implant of the 24 mm Watchman device in the anterior chicken wing DEMARIO 10/2021.        *AC stopped 11/2021 after JUAN MANUEL showed no leak around Watchman device.  5) St Sudhakar PPM 7/2014 for bradycardia while on therapy for AFib: changed to BiV ICD 10/2018.  6) Dyslipidemia   ( labs per PCP).     7) AS; Mild 2022; moderate (Mean 38) 12/2024.     8) CKD III to IV: Aldactone stopped in 2014. Cr 2 11/2018 (after mary): Cr 1.5 12/2018 & 8/2019;            *Cr 1.4/gfr 39 12/2020. Cr 1.9/gfr 26 3/2021; Cr 1.8/29 7/2022 (avoiding ACEi/ARB). (Dr. Evangelista then David)  9) DM  10) nephrolithiasis; cholecystectomy 11/2018.  11) LEELA (on CPAP), Nocturnal O2  12) LE venous insufficiency:  B SFV ablation 3/2020.  Compression stockings/wraps changed to pump (1 hr bid).  13) Recurrent/persistent osteomyelitis L ankle       @ [facility] last week with fever, chills: osteomyelitis.    Here with dyspnea, worse 3 days after d/c, small pleural effusions     Current cardiac meds: Norvasc 5 q.day; aspirin 81; Coreg 25 bid; Lasix 40 IV bid;  K 40 bid; Imdur 60.    Rec 1/6:   Continued

## 2025-01-07 NOTE — PROGRESS NOTES
End of Shift Note    Bedside shift change report given to [place] (oncoming nurse) by Idalia Sandoval RN (offgoing nurse).  Report included the following information SBAR, Kardex, and MAR    Shift worked:  7p-7a     Shift summary and any significant changes:     Scheduled medications were given, see MAR.  IV has been flushed and is patent.  Morning lab was done.  Patient is up  with the assistance of one with a walker.  Patient teaching and  routine rounding has been done.       Concerns for physician to address:       Zone phone for oncoming shift:          Activity:  Level of Assistance: Moderate assist, patient does 50-74%  Number times ambulated in hallways past shift: 0  Number of times OOB to chair past shift: 0    Cardiac:   Cardiac Monitoring: Yes      Cardiac Rhythm: Ventricular paced    Access:  Current line(s): PIV     Genitourinary:   Urinary Status: Voiding, External catheter    Respiratory:   O2 Device: Nasal cannula  Chronic home O2 use?: YES  Incentive spirometer at bedside: NO    GI:  Last BM (including prior to admit): 01/06/25  Current diet:  ADULT DIET; Regular; Low Fat/Low Chol/High Fiber/2 gm Na; 1200 ml  Passing flatus: YES    Pain Management:   Patient states pain is manageable on current regimen: YES    Skin:  Oliver Scale Score: 18  Interventions: Wound Offloading (Prevention Methods): Elevate heels, Pillows, Repositioning, Blankets    Patient Safety:  Fall Risk: Nursing Judgement-Fall Risk High(Add Comments): Yes  Fall Risk Interventions  Nursing Judgement-Fall Risk High(Add Comments): Yes  Toilet Every 2 Hours-In Advance of Need: Yes (Purewick present)  Hourly Visual Checks: In bed, Awake  Fall Visual Posted: Socks, Fall sign posted, Armband  Room Door Open: Deferred to promote rest  Alarm On: Bed  Patient Moved Closer to Nursing Station: No    Active Consults:   IP CONSULT TO CARDIOLOGY    Length of Stay:  Expected LOS: 4  Actual LOS: 3    Idalia Sandoval RN

## 2025-01-07 NOTE — PROGRESS NOTES
End of Shift Note    Bedside shift change report given to Bradnie (oncoming nurse) by France Acevedo RN (offgoing nurse).     Shift worked: 7a-7p     Shift summary and any significant changes:    Up to chair for all meals.  Oxygen titrated down to 0.5L via nasal cannula.   Family (?) at bedside this shift was verbally aggressive and manipulative to staff.   Concerns for physician to address:      [facility]  [phone]       Activity:  Level of Assistance: Moderate assist, patient does 50-74%  Number times ambulated in hallways past shift: 0  Number of times OOB to chair past shift: 3    Cardiac:   Cardiac Monitoring: Yes      Cardiac Rhythm: Ventricular paced    Access:  Current line(s): PIV     Genitourinary:   Urinary Status: Voiding, External catheter    Respiratory:   O2 Device: Nasal cannula  Chronic home O2 use?: NO  Incentive spirometer at bedside: N/A    GI:  Last BM (including prior to admit): 01/06/25  Current diet:  ADULT DIET; Regular; Low Fat/Low Chol/High Fiber/2 gm Na; 1200 ml  Passing flatus: YES    Pain Management:   Patient states pain is manageable on current regimen: YES    Skin:  Oliver Scale Score: 18  Interventions: Wound Offloading (Prevention Methods): Elevate heels, Turning, Repositioning    Patient Safety:  Fall Risk: Nursing Judgement-Fall Risk High(Add Comments): Yes  Fall Risk Interventions  Nursing Judgement-Fall Risk High(Add Comments): Yes  Toilet Every 2 Hours-In Advance of Need: Yes  Hourly Visual Checks: Awake, In chair  Fall Visual Posted: Fall sign posted  Room Door Open: Yes  Alarm On: Bed, Chair  Patient Moved Closer to Nursing Station: No    Active Consults:   IP CONSULT TO CARDIOLOGY    Length of Stay:  Expected LOS: 4  Actual LOS: 3    France Acevedo RN

## 2025-01-07 NOTE — CARE COORDINATION
Transition of Care Plan:    RUR: 36% (high RUR, readmission)  Prior Level of Functioning: Assistance with shopping, housework and cooking  Disposition: Return home with follow ups  ASHANTI: 1/8/2025  If SNF or IPR: Date FOC offered: N/A  Date FOC received: N/A  Accepting facility: N/A  Date authorization started with reference number: N/A  Date authorization received and expires: N/A  Follow up appointments: PCP/specialists if needed.  [facility] contact information listed on AVS.  DME needed: None.  Patient has a RW, rollator, hospital bed and 2L O2 NC nocturnal at home through [employer]; they also have several tanks for transportation.   Transportation at discharge: May need transportation  IM/IMM Medicare/ letter given: 2nd IM done 1/7/25.  Is patient a [facility] and connected with [facility]? No.   If yes, was [facility] transfer form completed and [facility] notified? N/A  Caregiver Contact: Wesley Oakes - spouse - [phone]   Discharge Caregiver contacted prior to discharge? Patient to contact.  Care Conference needed? No.  Barriers to discharge: Cards clearance, Cr improvement      1045 - Morning IDR team notified of POST on file but patient listed as Full Code.      Vera Troncoso, MANUELAN, RN    Care Management  [phone]

## 2025-01-07 NOTE — PLAN OF CARE
Problem: Chronic Conditions and Co-morbidities  Goal: Patient's chronic conditions and co-morbidity symptoms are monitored and maintained or improved  Outcome: Progressing     Problem: Discharge Planning  Goal: Discharge to home or other facility with appropriate resources  Outcome: Progressing     Problem: Pain  Goal: Verbalizes/displays adequate comfort level or baseline comfort level  Outcome: Progressing     Problem: Skin/Tissue Integrity  Goal: Absence of new skin breakdown  Description: 1.  Monitor for areas of redness and/or skin breakdown  2.  Assess vascular access sites hourly  3.  Every 4-6 hours minimum:  Change oxygen saturation probe site  4.  Every 4-6 hours:  If on nasal continuous positive airway pressure, respiratory therapy assess nares and determine need for appliance change or resting period.  Outcome: Progressing     Problem: Safety - Adult  Goal: Free from fall injury  Outcome: Progressing     Problem: ABCDS Injury Assessment  Goal: Absence of physical injury  Outcome: Progressing     Problem: Respiratory - Adult  Goal: Achieves optimal ventilation and oxygenation  Outcome: Progressing     Problem: Metabolic/Fluid and Electrolytes - Adult  Goal: Glucose maintained within prescribed range  Outcome: Progressing

## 2025-01-07 NOTE — PROGRESS NOTES
Spiritual Health History and Assessment/Progress Note  [facility]    Attempted Encounter,  ,  ,      Name: Marilee Oakes MRN: [id number]    Age: 74 y.o.     Sex: female   Language: English   Tenriism: Scientologist   NOVOA (dyspnea on exertion)     Date: 1/7/2025            Total Time Calculated: 13 min              Spiritual Assessment began in [facility] 2 CARDIAC MEDICAL STEP DOWN        Referral/Consult From: Rounding   Encounter Overview/Reason: Attempted Encounter  Service Provided For: Patient not available    [place], Belief, Meaning:   Patient unable to assess at this time  Family/Friends No family/friends present      Importance and Influence:  Patient unable to assess at this time  Family/Friends No family/friends present    Community:  Patient Other: unable to assess  Family/Friends No family/friends present    Assessment and Plan of Care:     Patient Interventions include: Other: patient appeared to be sleeping  Family/Friends Interventions include: No family/friends present    Patient Plan of Care: Spiritual Care available upon further referral  Family/Friends Plan of Care: No family/friends present    Electronically signed by Chaplain ROBB GAMA on 1/7/2025 at 11:14 AM

## 2025-01-08 VITALS
RESPIRATION RATE: 17 BRPM | WEIGHT: 217.2 LBS | DIASTOLIC BLOOD PRESSURE: 72 MMHG | HEIGHT: 67 IN | TEMPERATURE: 98.4 F | OXYGEN SATURATION: 93 % | SYSTOLIC BLOOD PRESSURE: 108 MMHG | HEART RATE: 80 BPM | BODY MASS INDEX: 34.09 KG/M2

## 2025-01-08 LAB
ANION GAP SERPL CALC-SCNC: 5 MMOL/L (ref 2–12)
BUN SERPL-MCNC: 30 MG/DL (ref 6–20)
BUN/CREAT SERPL: 16 (ref 12–20)
CALCIUM SERPL-MCNC: 9.6 MG/DL (ref 8.5–10.1)
CHLORIDE SERPL-SCNC: 105 MMOL/L (ref 97–108)
CO2 SERPL-SCNC: 29 MMOL/L (ref 21–32)
CREAT SERPL-MCNC: 1.88 MG/DL (ref 0.55–1.02)
GLUCOSE BLD STRIP.AUTO-MCNC: 123 MG/DL (ref 65–117)
GLUCOSE BLD STRIP.AUTO-MCNC: 217 MG/DL (ref 65–117)
GLUCOSE SERPL-MCNC: 110 MG/DL (ref 65–100)
POTASSIUM SERPL-SCNC: 3.9 MMOL/L (ref 3.5–5.1)
SERVICE CMNT-IMP: ABNORMAL
SERVICE CMNT-IMP: ABNORMAL
SODIUM SERPL-SCNC: 139 MMOL/L (ref 136–145)

## 2025-01-08 PROCEDURE — 6360000002 HC RX W HCPCS: Performed by: INTERNAL MEDICINE

## 2025-01-08 PROCEDURE — 6370000000 HC RX 637 (ALT 250 FOR IP): Performed by: NURSE PRACTITIONER

## 2025-01-08 PROCEDURE — 94640 AIRWAY INHALATION TREATMENT: CPT

## 2025-01-08 PROCEDURE — 2500000003 HC RX 250 WO HCPCS: Performed by: INTERNAL MEDICINE

## 2025-01-08 PROCEDURE — 80048 BASIC METABOLIC PNL TOTAL CA: CPT

## 2025-01-08 PROCEDURE — 2700000000 HC OXYGEN THERAPY PER DAY

## 2025-01-08 PROCEDURE — 36415 COLL VENOUS BLD VENIPUNCTURE: CPT

## 2025-01-08 PROCEDURE — 6370000000 HC RX 637 (ALT 250 FOR IP): Performed by: INTERNAL MEDICINE

## 2025-01-08 PROCEDURE — 82962 GLUCOSE BLOOD TEST: CPT

## 2025-01-08 RX ORDER — BUMETANIDE 1 MG/1
2 TABLET ORAL DAILY
Qty: 30 TABLET | Refills: 3 | Status: SHIPPED | OUTPATIENT
Start: 2025-01-08

## 2025-01-08 RX ADMIN — SODIUM CHLORIDE, PRESERVATIVE FREE 10 ML: 5 INJECTION INTRAVENOUS at 09:22

## 2025-01-08 RX ADMIN — BUDESONIDE 250 MCG: 0.25 INHALANT RESPIRATORY (INHALATION) at 08:58

## 2025-01-08 RX ADMIN — ENOXAPARIN SODIUM 30 MG: 100 INJECTION SUBCUTANEOUS at 09:20

## 2025-01-08 RX ADMIN — FERROUS SULFATE TAB 325 MG (65 MG ELEMENTAL FE) 325 MG: 325 (65 FE) TAB at 09:19

## 2025-01-08 RX ADMIN — OXYBUTYNIN CHLORIDE 5 MG: 5 TABLET ORAL at 09:18

## 2025-01-08 RX ADMIN — MICONAZOLE NITRATE: 2 OINTMENT TOPICAL at 09:21

## 2025-01-08 RX ADMIN — IPRATROPIUM BROMIDE 0.5 MG: 0.5 SOLUTION RESPIRATORY (INHALATION) at 08:51

## 2025-01-08 RX ADMIN — Medication 1000 UNITS: at 09:19

## 2025-01-08 RX ADMIN — INSULIN LISPRO 1 UNITS: 100 INJECTION, SOLUTION INTRAVENOUS; SUBCUTANEOUS at 12:20

## 2025-01-08 RX ADMIN — ASPIRIN 81 MG: 81 TABLET, CHEWABLE ORAL at 09:19

## 2025-01-08 RX ADMIN — AMLODIPINE BESYLATE 5 MG: 5 TABLET ORAL at 09:18

## 2025-01-08 RX ADMIN — PANTOPRAZOLE SODIUM 40 MG: 40 TABLET, DELAYED RELEASE ORAL at 05:57

## 2025-01-08 RX ADMIN — ISOSORBIDE MONONITRATE 60 MG: 30 TABLET, EXTENDED RELEASE ORAL at 09:18

## 2025-01-08 RX ADMIN — ARFORMOTEROL TARTRATE 15 MCG: 15 SOLUTION RESPIRATORY (INHALATION) at 08:58

## 2025-01-08 RX ADMIN — DOXYCYCLINE HYCLATE 100 MG: 100 TABLET, COATED ORAL at 09:19

## 2025-01-08 RX ADMIN — CARVEDILOL 25 MG: 12.5 TABLET, FILM COATED ORAL at 09:18

## 2025-01-08 NOTE — PLAN OF CARE
Change oxygen saturation probe site  4.  Every 4-6 hours:  If on nasal continuous positive airway pressure, respiratory therapy assess nares and determine need for appliance change or resting period.  1/8/2025 1123 by Stephanie Rm RN  Outcome: Progressing  1/8/2025 0834 by Brandie Busch RN  Outcome: Progressing  1/8/2025 0246 by Meri Herrera RN  Outcome: Progressing     Problem: Safety - Adult  Goal: Free from fall injury  1/8/2025 1123 by Stephanie Rm RN  Outcome: Progressing  1/8/2025 0834 by Brandie Busch RN  Outcome: Progressing  1/8/2025 0246 by Meri Herrera RN  Outcome: Progressing  Flowsheets (Taken 1/7/2025 1920 by Brandie Busch RN)  Free From Fall Injury:   Based on caregiver fall risk screen, instruct family/caregiver to ask for assistance with transferring infant if caregiver noted to have fall risk factors   Instruct family/caregiver on patient safety     Problem: ABCDS Injury Assessment  Goal: Absence of physical injury  1/8/2025 1123 by Stephanie Rm RN  Outcome: Progressing  1/8/2025 0834 by Brandie Busch RN  Outcome: Progressing  1/8/2025 0246 by Meri Herrera RN  Outcome: Progressing  Flowsheets (Taken 1/7/2025 1920 by Brandie Busch RN)  Absence of Physical Injury: Implement safety measures based on patient assessment     Problem: Respiratory - Adult  Goal: Achieves optimal ventilation and oxygenation  1/8/2025 1123 by Stephanie Rm RN  Outcome: Progressing  1/8/2025 0834 by Brandie Busch RN  Outcome: Progressing  Flowsheets (Taken 1/8/2025 0742 by Stephanie Rm RN)  Achieves optimal ventilation and oxygenation: Assess for changes in respiratory status  1/8/2025 0246 by Meri Herrera RN  Outcome: Progressing  Flowsheets (Taken 1/7/2025 1920 by Brandie Busch RN)  Achieves optimal ventilation and oxygenation: Assess for changes in respiratory status     Problem: Metabolic/Fluid and Electrolytes - Adult  Goal: Glucose maintained within  prescribed range  1/8/2025 1123 by Stephanie Rm, RN  Outcome: Progressing  1/8/2025 0834 by Brandie Busch RN  Outcome: Progressing  Flowsheets (Taken 1/8/2025 0742 by Stephanie Rm, RN)  Glucose maintained within prescribed range: Monitor blood glucose as ordered  1/8/2025 0246 by Meri Herrera, RN  Outcome: Progressing

## 2025-01-08 NOTE — PROGRESS NOTES
Hospitalist Progress Note    NAME:   Marilee Oakes   : 1950   MRN: [id number]     Date/Time: 2025 1:37 AM  Patient PCP: Darien Potter MD    Estimated discharge date:  Barriers:       Assessment / Plan:    Acute on chronic hypoxic respiratory failure  -Most likely secondary to CHF exacerbation acute on chronic diastolic apparently her nephrologist cut down the dose of Bumex in November  -   CTA  infiltrates/large pleural effusion or PE  - COVID/flu negative  - respiratory panel is pending   - Lasix 40 mg bid  - Strict JENNIFER's  - Daily weight  -Hypokalemia replaced  -Improving significantly, follow cardiology recommendation, consider discharge within 24 to 48 hours.  Try to wean her off oxygen     History of diabetes  -Diet diabetic diet  - Most recent A1c was 5.8   History of diastolic heart failure/A-fib/morbid obesity/status post Watchman device  - Continue carvedilol  -Status post PPM, currently medically ventricular paced     History of complicated left lower extremity osteomyelitis/wound infection  Hardware infection  Continue doxycycline     History of COPD oxygen dependent  - Continue home inhaler no wheezing on exam             Medical Decision Making:   I personally reviewed labs:  I personally reviewed imaging:  I personally reviewed EKG:  Toxic drug monitoring:   Discussed case with:         Code Status:   DVT Prophylaxis:   GI Prophylaxis:    Subjective:     Chief Complaint / Reason for Physician Visit  Discussed with RN events overnight.       Objective:     VITALS:   Last 24hrs VS reviewed since prior progress note. Most recent are:  Patient Vitals for the past 24 hrs:   BP Temp Temp src Pulse Resp SpO2 Weight   25 2302 114/73 97.8 °F (36.6 °C) Oral 83 -- 93 % --   25 1713 133/79 -- -- 84 -- -- --   25 1459 -- -- -- 81 -- -- --   25 1200 124/88 98 °F (36.7 °C) -- 80 -- 96 % --   25 0806 (!) 140/82 97.6 °F (36.4 °C) Oral 80 16 99 % --   25 0452 --

## 2025-01-08 NOTE — DISCHARGE INSTRUCTIONS
Please check your weight on a daily basis if you are having more than 3 pound weight gain over the last please call your cardiologist immediately, please check your blood pressure on a daily basis if the top number (systolic blood pressure) is consistently less than 120 or above 160 please call your cardiologist immediately.  Please check your kidney function (BMP) in 1 week with your primary care doctor.  Please follow-up with cardiologyas instructed

## 2025-01-08 NOTE — CARE COORDINATION
Pt is cleared for d/c from a CM standpoint.     Transition of Care Plan:     RUR: 35% (high RUR, readmission)  Prior Level of Functioning: Assistance with shopping, housework and cooking  Disposition: Return home with follow ups  ASHANTI: 1/8/2025  If SNF or IPR: Date FOC offered: N/A  Date FOC received: N/A  Accepting facility: N/A  Date authorization started with reference number: N/A  Date authorization received and expires: N/A  Follow up appointments: PCP/specialists if needed.  [facility] contact information listed on AVS.  DME needed: None.  Patient has a RW, rollator, hospital bed and 2L O2 NC nocturnal at home through [employer]; they also have several tanks for transportation.   Transportation at discharge: May need transportation  IM/IMM Medicare/ letter given: 2nd IM done 1/7/25.  Is patient a  and connected with VA? No.              If yes, was [facility] transfer form completed and VA notified? N/A  Caregiver Contact: Wesley Oakes - spouse - [phone]   Discharge Caregiver contacted prior to discharge? Patient to contact.  Care Conference needed? No.  Barriers to discharge: Cards clearance, Cr improvement      CM acknowledged d/c order.  CM met with pt to discuss. Pt's spouse at bedside to transport pt home.  2IM previously provided and active.        2:31 p.m. Pt's spouse approached CM asking for a [facility] order to be sent to [facility].  CM will send.        01/08/25 8847   Services At/After Discharge   Transition of Care Consult (CM Consult) Discharge Planning   Services At/After Discharge None   Mode of Transport at Discharge Other (see comment)  (spouse)         Camille Arevalo LMSW  Supervisee in Social Work  Care Management, [facility]  [phone]

## 2025-01-08 NOTE — PROGRESS NOTES
End of Shift Note    Bedside shift change report given to Meri Herrera RN  (oncoming nurse) by Brandie Busch RN (offgoing nurse).  Report included the following information SBAR, Kardex, ED Summary, Procedure Summary, Intake/Output, MAR, Accordion, Recent Results, Med Rec Status, Cardiac Rhythm V-Paced, and Alarm Parameters     Shift worked:  3252-6282     Shift summary and any significant changes:     No significant change.  Pt. Denies SOB, CP and dizziness.     Concerns for physician to address:       Zone phone for oncoming shift:          Activity:  Level of Assistance: Moderate assist, patient does 50-74%  Number times ambulated in hallways past shift: 0  Number of times OOB to chair past shift: 0    Cardiac:   Cardiac Monitoring: Yes      Cardiac Rhythm: Ventricular paced    Access:  Current line(s): PIV     Genitourinary:   Urinary Status: Voiding, External catheter    Respiratory:   O2 Device: Nasal cannula  Chronic home O2 use?: YES  Incentive spirometer at bedside: YES    GI:  Last BM (including prior to admit): 01/06/25  Current diet:  ADULT DIET; Regular; Low Fat/Low Chol/High Fiber/2 gm Na; 1200 ml  Passing flatus: YES    Pain Management:   Patient states pain is manageable on current regimen: YES    Skin:  Oliver Scale Score: 18  Interventions: Wound Offloading (Prevention Methods): Elevate heels, Pillows, Turning, Repositioning    Patient Safety:  Fall Risk: Nursing Judgement-Fall Risk High(Add Comments): No  Fall Risk Interventions  Nursing Judgement-Fall Risk High(Add Comments): No  Toilet Every 2 Hours-In Advance of Need: Yes  Hourly Visual Checks: Awake, In chair  Fall Visual Posted: Fall sign posted  Room Door Open: Deferred to promote rest  Alarm On: Bed  Patient Moved Closer to Nursing Station: No    Active Consults:   IP CONSULT TO CARDIOLOGY    Length of Stay:  Expected LOS: 4  Actual LOS: 4    Brandie Busch RN

## 2025-01-08 NOTE — PROGRESS NOTES
- Bedside shift change report given to Brandie Busch RN (oncoming nurse) by France Acevedo RN  (offgoing nurse). Report included the following information Nurse Handoff Report, Index, ED Encounter Summary, ED SBAR, Adult Overview, Intake/Output, MAR, Recent Results, Med Rec Status, Cardiac Rhythm V-Paced, and Alarm Parameters.       - Pt. refues Blood Glucose check tonight. Education provided,  for risk of not checking BG and benefit of checking BG. Pt. states \"I already checked blood sugar 3 times today\". Pt. is asymptomatic and stable vital signs. Prior B @ 1630 <-- 153 @ 1209 <-- 116 @ 0841.

## 2025-01-08 NOTE — TELEPHONE ENCOUNTER
Spoke with  and he states patient is still in the hospital . He will call office to schedule appointment when she is discharged for hospital follow-up.

## 2025-01-08 NOTE — PROGRESS NOTES
Cardiology follow-up.      CR 1.88, down slightly from yesterday.    ok for d/c from cardiac standpoint w/ starting bumex 2 mg qam on Friday am; will need f/u labs next week and daily wt.  OV w/ me in 3 weeks

## 2025-01-08 NOTE — PROGRESS NOTES
0730: Bedside and Verbal shift change report given to Stephanie (oncoming nurse) by Divya (offgoing nurse). Report included the following information Nurse Handoff Report, Index, ED Encounter Summary, ED SBAR, Adult Overview, Intake/Output, MAR, Recent Results, and Cardiac Rhythm V. Paced .      DISCHARGE SUMMARY FROM [facility] NURSE    The patient is stable for discharge. I have reviewed the discharge instructions with the patient. The patient verbalized understanding. All questions were fully answered. The patient verbalized no complaints.    Hard scripts and medication handouts were given and reviewed with the patient. Appropriate educational materials and medication side effects teaching were also provided.    Cardiac monitor and IV line(s) were removed.     There were no personal belongings, valuables or home medications left at patient's bedside,  or safe.     Stephanie Rm RN, 1/8/2025 5:03 PM

## 2025-01-08 NOTE — PLAN OF CARE
Problem: Chronic Conditions and Co-morbidities  Goal: Patient's chronic conditions and co-morbidity symptoms are monitored and maintained or improved  1/8/2025 0834 by Brandie Busch RN  Outcome: Progressing  1/8/2025 0246 by Meri Herrera RN  Outcome: Progressing     Problem: Discharge Planning  Goal: Discharge to home or other facility with appropriate resources  1/8/2025 0834 by Brandie Busch RN  Outcome: Progressing  1/8/2025 0246 by Meri Herrera RN  Outcome: Progressing  Flowsheets (Taken 1/7/2025 1920 by Brandie Busch RN)  Discharge to home or other facility with appropriate resources: Identify barriers to discharge with patient and caregiver     Problem: Pain  Goal: Verbalizes/displays adequate comfort level or baseline comfort level  1/8/2025 0834 by Brandie Busch RN  Outcome: Progressing  1/8/2025 0246 by Meri Herrera RN  Outcome: Progressing     Problem: Skin/Tissue Integrity  Goal: Absence of new skin breakdown  Description: 1.  Monitor for areas of redness and/or skin breakdown  2.  Assess vascular access sites hourly  3.  Every 4-6 hours minimum:  Change oxygen saturation probe site  4.  Every 4-6 hours:  If on nasal continuous positive airway pressure, respiratory therapy assess nares and determine need for appliance change or resting period.  1/8/2025 0834 by Brandie Busch RN  Outcome: Progressing  1/8/2025 0246 by Meri Herrera RN  Outcome: Progressing     Problem: Safety - Adult  Goal: Free from fall injury  1/8/2025 0834 by Brandie Busch RN  Outcome: Progressing  1/8/2025 0246 by Meri Herrera RN  Outcome: Progressing  Flowsheets (Taken 1/7/2025 1920 by Brandie Busch RN)  Free From Fall Injury:   Based on caregiver fall risk screen, instruct family/caregiver to ask for assistance with transferring infant if caregiver noted to have fall risk factors   Instruct family/caregiver on patient safety     Problem: ABCDS Injury Assessment  Goal: Absence of physical

## 2025-01-08 NOTE — PROGRESS NOTES
Cardiology Progress Note  1/8/2025     Admit Date: 1/4/2025  Admit Diagnosis: NOVOA (dyspnea on exertion) [R06.09]  SOB (shortness of breath) [R06.02]  CC: none currently  Cardiologist:  Dr Abbott.       Cardiac Assessment/Plan:    1) CAD (Prox LAD KAY 4/2014 for NQWMI), Neg PET for ischemia 9/2018. Min CAD 2/2019.  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  Entresto was too expensive.          *EF 15-20% 9/2018.  EF 30% w/ minimal CAD at cath 2/2019. EF 20-25% (m-mod MR; mod TR) 8/2019.          *EF 35-40% 8/2020 (admit for CHF: too much salt).         *EF 40-45% 2/2021 (@ MCV for CHF, off some meds after kyphoplasty/low BPs)          *EF 50% 1/2022 (AS mild w/ mean 16).          *Nl EF 12/2024.  3) HTN,   4) AFIB: PAfib (RFA 4/2016 and 1/2017), Recurrent/persistent afib 2017/2018: AV node ablation 10/2018.        *implant of the 24 mm Watchman device in the anterior chicken wing DEMARIO 10/2021.        *AC stopped 11/2021 after JUAN MANUEL showed no leak around Watchman device.  5) St Sudhakar PPM 7/2014 for bradycardia while on therapy for AFib: changed to BiV ICD 10/2018.  6) Dyslipidemia   ( labs per PCP).     7) AS; Mild 2022; moderate (Mean 38) 12/2024.     8) CKD III to IV: Aldactone stopped in 2014. Cr 2 11/2018 (after mary): Cr 1.5 12/2018 & 8/2019;            *Cr 1.4/gfr 39 12/2020. Cr 1.9/gfr 26 3/2021; Cr 1.8/29 7/2022 (avoiding ACEi/ARB). (Dr. Evangelista then David)  9) DM  10) nephrolithiasis; cholecystectomy 11/2018.  11) LEELA (on CPAP), Nocturnal O2  12) LE venous insufficiency:  B SFV ablation 3/2020.  Compression stockings/wraps changed to pump (1 hr bid).  13) Recurrent/persistent osteomyelitis L ankle       @ [facility] last week with fever, chills: osteomyelitis.    Here with dyspnea, worse 3 days after d/c, small pleural effusions     Current cardiac meds: Norvasc 5 q.day; aspirin 81; Coreg 25 bid; Lasix 40 IV bid;  K 40 bid; Imdur 60.    Rec 1/6:   Continued  \"     TTE 12/20/24 @ [facility]:    Left Ventricle: Normal left ventricular systolic function with a visually estimated EF of 60 - 65%. Left ventricle size is normal. Increased wall thickness. Findings consistent with mild concentric hypertrophy. Normal wall motion.    Right Ventricle: Right ventricle is mildly dilated. Lead present in the right ventricle.    Aortic Valve: Probably bicuspid valve. Moderate to severe stenosis of the aortic valve. AV mean gradient is 38 mmHg.    Tricuspid Valve: Mild to moderate regurgitation. Moderate pulmonary hypertension present. Estimated PA pressure of 55 mmhg.     JUAN MANUEL 12/23/24 @ [facility]; Indication: Bacteremia  Normal EF of 60-65%  Mild MR, no vegetations  DEMARIO sealed with Watchman (visually normal)  Functional bicuspid valve with fused raphe, trace to mild AI, no vegetations.  Mild- moderate TR, no vegetations.  Normal pulmonic valve, no vegetations.  Pacemaker leads (visualized portions) visually normal, no vegetations  IMPRESSION: No evidence of valvular vegetations / pacemaker lead vegetations     Also neg JUAN MANUEL for endocarditis 12/2023.  ______________________________________________________________________     The patient reports no CP; worse dyspnea/LE edema 3 days after d/c from [facility]     No PND, orthopnea, palpitations, pre-syncope, syncope, peripheral edema, or decrease in exercise tolerance.  No current complaints.     ECG independently interpreted: Vpacing (?sinus)  Tele  independently interpreted: Vpacing.     CXR reviewed: \"small bilateral pleural effusions with underlying atelectasis old right rib fractures are noted. Kyphoplasty is seen at 2 levels at the thoracolumbar junction.\"  Chest CTA \"Persistent small bilateral pleural effusions with underlying atelectasis. No evidence of pulmonary embolism.\"     Labs reviewed; Notable 1/5: K 3.0; Cr 1.56 (1.5 baseline last month); Hg 10.4; Nl trop (300-500 12/2024 w/ Nl EF)     For other plans, see orders.  Hospital

## 2025-01-08 NOTE — PROGRESS NOTES
End of Shift Note    Bedside shift change report given to AGNES Ramírez (oncoming nurse) by Meri Herrera RN (offgoing nurse).  Report included the following information SBAR, Kardex, ED Summary, Procedure Summary, Intake/Output, MAR, Recent Results, and Cardiac Rhythm V Paced    Shift worked:  11pm-7am     Shift summary and any significant changes:     Pt A&Ox4 on room air.   Voiding. Lab done this morning.  Patient teaching and  routine rounding has been done.    Denies pain   No acute distress or any kind of SOB noticed at this time   Concerns for physician to address:  ..     Zone phone for oncoming shift:   ..       Activity:  Level of Assistance: Moderate assist, patient does 50-74%  Number times ambulated in hallways past shift: 0  Number of times OOB to chair past shift: 1    Cardiac:   Cardiac Monitoring: Yes      Cardiac Rhythm: Ventricular paced    Access:  Current line(s): PIV     Genitourinary:   Urinary Status: Voiding, External catheter    Respiratory:   O2 Device: Nasal cannula  Chronic home O2 use?: Nasal canula  Incentive spirometer at bedside: N/A    GI:  Last BM (including prior to admit): 01/06/25  Current diet:  ADULT DIET; Regular; Low Fat/Low Chol/High Fiber/2 gm Na; 1200 ml  Passing flatus: YES    Pain Management:   Patient states pain is manageable on current regimen: YES    Skin:  Oliver Scale Score: 18  Interventions: Wound Offloading (Prevention Methods): Elevate heels, Pillows, Turning, Repositioning    Patient Safety:  Fall Risk: Nursing Judgement-Fall Risk High(Add Comments): Yes  Fall Risk Interventions  Nursing Judgement-Fall Risk High(Add Comments): Yes  Toilet Every 2 Hours-In Advance of Need: Yes  Hourly Visual Checks: Awake, In chair  Fall Visual Posted: Fall sign posted  Room Door Open: Deferred to promote rest  Alarm On: Bed  Patient Moved Closer to Nursing Station: No    Active Consults:   IP CONSULT TO CARDIOLOGY    Length of Stay:  Expected LOS: 4  Actual LOS: 4    Meri

## 2025-01-08 NOTE — PLAN OF CARE
Problem: Chronic Conditions and Co-morbidities  Goal: Patient's chronic conditions and co-morbidity symptoms are monitored and maintained or improved  Outcome: Progressing     Problem: Discharge Planning  Goal: Discharge to home or other facility with appropriate resources  Outcome: Progressing     Problem: Pain  Goal: Verbalizes/displays adequate comfort level or baseline comfort level  Outcome: Progressing     Problem: Skin/Tissue Integrity  Goal: Absence of new skin breakdown  Description: 1.  Monitor for areas of redness and/or skin breakdown  2.  Assess vascular access sites hourly  3.  Every 4-6 hours minimum:  Change oxygen saturation probe site  4.  Every 4-6 hours:  If on nasal continuous positive airway pressure, respiratory therapy assess nares and determine need for appliance change or resting period.  Outcome: Progressing     Problem: Safety - Adult  Goal: Free from fall injury  Outcome: Progressing     Problem: ABCDS Injury Assessment  Goal: Absence of physical injury  Outcome: Progressing     Problem: Respiratory - Adult  Goal: Achieves optimal ventilation and oxygenation  1/8/2025 0246 by Meri Herrera, RN  Outcome: Progressing  1/7/2025 1500 by Vera Avila, RT  Outcome: Progressing     Problem: Metabolic/Fluid and Electrolytes - Adult  Goal: Glucose maintained within prescribed range  Outcome: Progressing

## 2025-01-10 ENCOUNTER — TELEPHONE (OUTPATIENT)
Age: 75
End: 2025-01-10

## 2025-01-10 NOTE — TELEPHONE ENCOUNTER
Care Transitions Initial Follow Up Call    Outreach made within 2 business days of discharge: Yes    Patient: Marilee Oakes Patient : 1950   MRN: [id number]  Reason for Admission: NOVOA  Discharge Date: 25       Spoke with: patient    Discharge department/facility: [facility] Interactive Patient Contact:  Was patient able to fill all prescriptions: Yes  Was patient instructed to bring all medications to the follow-up visit: Yes  Is patient taking all medications as directed in the discharge summary? Yes  Does patient understand their discharge instructions: Yes  Does patient have questions or concerns that need addressed prior to 7-14 day follow up office visit: no    Additional needs identified to be addressed with provider  No needs identified             Scheduled appointment with PCP within 7-14 days    Follow Up  Future Appointments   Date Time Provider Department Center   2025  2:00 PM [facility]   2025  2:00 PM Darien Potter MD [facility]       Hayley Nesbitt

## 2025-01-10 NOTE — TELEPHONE ENCOUNTER
Patient was discharged from the hospital on 1/8 and is needing a hospital follow up appointment. She can be reached at [phone].

## 2025-01-11 NOTE — DISCHARGE SUMMARY
Discharge Summary    Name: Marilee Oakes  [id number]  YOB: 1950 (Age: 74 y.o.)   Date of Admission: 1/4/2025  Date of Discharge: 1/08/2024  Attending Physician: No att. providers found    Discharge Diagnosis:   Acute on chronic hypoxic respiratory failure   CHF exacerbation acute on chronic diastolic   Consultations:  IP CONSULT TO CARDIOLOGY  IP CONSULT TO CASE MANAGEMENT      Brief Admission History/Reason for Admission Per Gilbert Bradshaw MD:   Marilee Oakes is a 74 y.o.  female with PMHx significant for diastolic CHF, COPD, A-fib s/p ppm and Watchman, CAD, DM2 seen previously for ORIF 3/2024 ORIF LLE with ankle fracture complicated by 4/2024 with drainage and MRSA bacteremia and non-union of fracture s/p I/D and hardware removal 4/2024 and prolonged IV antibiotic course. Then on 6/25/2024 underwent LLE tibiotalar intramedullary nail fusion. This was also unfortunately complicated by MRSA infection s/p hardware removal and application of bone cement due to osteomyelitis from hardware infection and treated with prolonged Doxycycline course presents to the emergency department with acute on chronic hypoxic respiratory failure likely secondary to volume overload with given her CHF. She has bilateral lower extremity edema. Bilateral pleural effusions seen on x-ray, evidence of mild pleural effusion on CT with no evidence of pulmonary edema, lab workup was significant for BNP of 3000 creatinine of 1.5, PCR negative for influenza and COVID.  We were asked to admit for work up and evaluation of the above problems.  Of note her nephrologist recently cut on the dose of Bumex from twice daily to once daily in [facility]    Brief Hospital Course by Main Problems:     Acute on chronic hypoxic respiratory failure  - Most likely secondary to acute on chronic diastolic heart failure  - Recently his nephrologist cut down the dose of Bumex  - Started on IV diuretic responded well  -  times daily     benzonatate 100 MG capsule  Commonly known as: TESSALON     carvedilol 25 MG tablet  Commonly known as: COREG  Take 1 tablet by mouth with breakfast and with evening meal     doxycycline hyclate 100 MG tablet  Commonly known as: VIBRA-TABS  Take 1 tablet by mouth 2 times daily     ferrous sulfate 325 (65 Fe) MG tablet  Commonly known as: IRON 325  Take 1 tablet by mouth daily (with breakfast)     isosorbide mononitrate 60 MG extended release tablet  Commonly known as: IMDUR  Take 1 tablet by mouth daily     Lidocaine (Anorectal) 5 % Crea     omeprazole 40 MG delayed release capsule  Commonly known as: PRILOSEC  Take 1 capsule by mouth daily     ondansetron 4 MG disintegrating tablet  Commonly known as: ZOFRAN-ODT  Take 1 tablet by mouth 3 times daily as needed for Nausea or Vomiting     oxyBUTYnin 5 MG tablet  Commonly known as: DITROPAN     pregabalin 75 MG capsule  Commonly known as: LYRICA  Take 1 capsule by mouth daily for 360 days. Max Daily Amount: 75 mg     QUEtiapine 25 MG tablet  Commonly known as: SEROquel  Take 1 tablet by mouth nightly     Renal Multivitamin Formula Tabs  Take 1 tablet by mouth daily     Trelegy Ellipta 100-62.5-25 MCG/ACT Aepb inhaler  Generic drug: fluticasone-umeclidin-vilant  Inhale 1 puff into the lungs daily     vitamin D 25 MCG (1000 UT) Caps  Take 1 capsule by mouth daily            STOP taking these medications      nystatin [id number] UNIT/GM cream  Commonly known as: MYCOSTATIN     potassium chloride 20 MEQ extended release tablet  Commonly known as: KLOR-CON M               Where to Get Your Medications        These medications were sent to [place], [place] - [address] - P [phone] - F [phone]  [address], [place] [address]      Phone: [phone]   bumetanide 1 MG tablet             DISPOSITION:    Home with Family:       Home with HH/PT/OT/RN:    SNF/LTC:    VALERIE:    OTHER:            Code

## 2025-01-11 NOTE — PROGRESS NOTES
Physician Progress Note      PATIENT:               NEDRA WEBER  CSN #:                  [id number]  :                       1950  ADMIT DATE:       2025 12:33 PM  DISCH DATE:        2025 4:19 PM  RESPONDING  PROVIDER #:        Gilbert Bradshaw MD          QUERY TEXT:    Patient admitted with CHF exacerbation. Noted documentation of acute   respiratory failure (H&P) with no acute distress and no accessory muscle use   documented. In order to support the diagnosis of acute respiratory failure,   please include additional clinical indicators in your documentation.  Or   please document if the diagnosis of acute respiratory failure has been ruled   out after further study.    The medical record reflects the following:  Risk Factors:  \"Pt arrives on 2L NC, pt reports using at home PRN.\" (ED)  \"History of COPD oxygen dependent - Continue home inhaler no wheezing on exam\"   (Dr Bradshaw )    Clinical Indicators:  --ED: No acute distress. no acc muscle use. Pt arrives via EMS from home w CC   of sob onset today, worse while lying flat & w/ exertion  -- H&P: No accessory muscle use.  -- FANI Bolton RN: Patient denies SOB, rest comfortably overnight, O2 sat 98%   on 2L NC  -- WILLIS Colon RN: VSS on 2L O2, down to 1L this pm.  -- Dr Abbott: no CP/resting dyspnea. 99% 1L    Treatment: SpO2 monitoring, 1-2L supplemental O2  Options provided:  -- Acute Respiratory Failure as evidenced by, Please document evidence.  -- Acute Respiratory Failure ruled out after study  -- Acute respiratory failure ruled out and chronic respiratory failure   confirmed  -- Other - I will add my own diagnosis  -- Disagree - Not applicable / Not valid  -- Disagree - Clinically unable to determine / Unknown  -- Refer to Clinical Documentation Reviewer    PROVIDER RESPONSE TEXT:    This patient is in acute respiratory failure as evidenced by hypoxia    Query created by: IVAN CANAS on 2025 12:51 PM      Electronically

## 2025-01-16 ENCOUNTER — OFFICE VISIT (OUTPATIENT)
Facility: CLINIC | Age: 75
End: 2025-01-16
Payer: MEDICARE

## 2025-01-16 VITALS
HEIGHT: 67 IN | DIASTOLIC BLOOD PRESSURE: 78 MMHG | WEIGHT: 218.1 LBS | HEART RATE: 103 BPM | OXYGEN SATURATION: 96 % | BODY MASS INDEX: 34.23 KG/M2 | SYSTOLIC BLOOD PRESSURE: 130 MMHG | RESPIRATION RATE: 16 BRPM

## 2025-01-16 DIAGNOSIS — E11.40 TYPE 2 DIABETES MELLITUS WITH DIABETIC NEUROPATHY, WITHOUT LONG-TERM CURRENT USE OF INSULIN (HCC): Primary | ICD-10-CM

## 2025-01-16 DIAGNOSIS — L97.515 ULCER OF RIGHT FOOT WITH MUSCLE INVOLVEMENT WITHOUT EVIDENCE OF NECROSIS (HCC): ICD-10-CM

## 2025-01-16 DIAGNOSIS — F51.01 PRIMARY INSOMNIA: ICD-10-CM

## 2025-01-16 DIAGNOSIS — E66.9 OBESITY (BMI 30-39.9): ICD-10-CM

## 2025-01-16 DIAGNOSIS — I10 ESSENTIAL HYPERTENSION: Chronic | ICD-10-CM

## 2025-01-16 PROBLEM — R73.9 STEROID-INDUCED HYPERGLYCEMIA: Status: RESOLVED | Noted: 2024-05-02 | Resolved: 2025-01-16

## 2025-01-16 PROBLEM — B37.49 YEAST UTI: Status: RESOLVED | Noted: 2024-04-27 | Resolved: 2025-01-16

## 2025-01-16 PROBLEM — R06.02 SOB (SHORTNESS OF BREATH): Status: RESOLVED | Noted: 2025-01-04 | Resolved: 2025-01-16

## 2025-01-16 PROBLEM — E11.65 TYPE 2 DIABETES MELLITUS WITH HYPERGLYCEMIA, WITHOUT LONG-TERM CURRENT USE OF INSULIN (HCC): Status: RESOLVED | Noted: 2020-04-30 | Resolved: 2025-01-16

## 2025-01-16 PROBLEM — J41.0 SIMPLE CHRONIC BRONCHITIS (HCC): Chronic | Status: RESOLVED | Noted: 2024-04-07 | Resolved: 2025-01-16

## 2025-01-16 PROBLEM — T38.0X5A STEROID-INDUCED HYPERGLYCEMIA: Status: RESOLVED | Noted: 2024-05-02 | Resolved: 2025-01-16

## 2025-01-16 PROBLEM — Z78.9 UNABLE TO CARE FOR SELF: Status: RESOLVED | Noted: 2024-05-11 | Resolved: 2025-01-16

## 2025-01-16 PROCEDURE — G8399 PT W/DXA RESULTS DOCUMENT: HCPCS | Performed by: STUDENT IN AN ORGANIZED HEALTH CARE EDUCATION/TRAINING PROGRAM

## 2025-01-16 PROCEDURE — 1036F TOBACCO NON-USER: CPT | Performed by: STUDENT IN AN ORGANIZED HEALTH CARE EDUCATION/TRAINING PROGRAM

## 2025-01-16 PROCEDURE — G8427 DOCREV CUR MEDS BY ELIG CLIN: HCPCS | Performed by: STUDENT IN AN ORGANIZED HEALTH CARE EDUCATION/TRAINING PROGRAM

## 2025-01-16 PROCEDURE — 3078F DIAST BP <80 MM HG: CPT | Performed by: STUDENT IN AN ORGANIZED HEALTH CARE EDUCATION/TRAINING PROGRAM

## 2025-01-16 PROCEDURE — 1126F AMNT PAIN NOTED NONE PRSNT: CPT | Performed by: STUDENT IN AN ORGANIZED HEALTH CARE EDUCATION/TRAINING PROGRAM

## 2025-01-16 PROCEDURE — 3075F SYST BP GE 130 - 139MM HG: CPT | Performed by: STUDENT IN AN ORGANIZED HEALTH CARE EDUCATION/TRAINING PROGRAM

## 2025-01-16 PROCEDURE — 1090F PRES/ABSN URINE INCON ASSESS: CPT | Performed by: STUDENT IN AN ORGANIZED HEALTH CARE EDUCATION/TRAINING PROGRAM

## 2025-01-16 PROCEDURE — 99204 OFFICE O/P NEW MOD 45 MIN: CPT | Performed by: STUDENT IN AN ORGANIZED HEALTH CARE EDUCATION/TRAINING PROGRAM

## 2025-01-16 PROCEDURE — 3017F COLORECTAL CA SCREEN DOC REV: CPT | Performed by: STUDENT IN AN ORGANIZED HEALTH CARE EDUCATION/TRAINING PROGRAM

## 2025-01-16 PROCEDURE — G8417 CALC BMI ABV UP PARAM F/U: HCPCS | Performed by: STUDENT IN AN ORGANIZED HEALTH CARE EDUCATION/TRAINING PROGRAM

## 2025-01-16 PROCEDURE — 1111F DSCHRG MED/CURRENT MED MERGE: CPT | Performed by: STUDENT IN AN ORGANIZED HEALTH CARE EDUCATION/TRAINING PROGRAM

## 2025-01-16 PROCEDURE — 3044F HG A1C LEVEL LT 7.0%: CPT | Performed by: STUDENT IN AN ORGANIZED HEALTH CARE EDUCATION/TRAINING PROGRAM

## 2025-01-16 PROCEDURE — 1123F ACP DISCUSS/DSCN MKR DOCD: CPT | Performed by: STUDENT IN AN ORGANIZED HEALTH CARE EDUCATION/TRAINING PROGRAM

## 2025-01-16 PROCEDURE — 2022F DILAT RTA XM EVC RTNOPTHY: CPT | Performed by: STUDENT IN AN ORGANIZED HEALTH CARE EDUCATION/TRAINING PROGRAM

## 2025-01-16 RX ORDER — DOXYCYCLINE HYCLATE 100 MG
100 TABLET ORAL 2 TIMES DAILY
Qty: 60 TABLET | Refills: 1 | Status: SHIPPED | OUTPATIENT
Start: 2025-01-16 | End: 2025-03-17

## 2025-01-16 RX ORDER — QUETIAPINE FUMARATE 25 MG/1
25 TABLET, FILM COATED ORAL
Qty: 60 TABLET | Refills: 2 | Status: SHIPPED | OUTPATIENT
Start: 2025-01-16

## 2025-01-16 RX ORDER — AMLODIPINE BESYLATE 5 MG/1
5 TABLET ORAL DAILY
Qty: 90 TABLET | Refills: 1 | Status: SHIPPED | OUTPATIENT
Start: 2025-01-16

## 2025-01-16 ASSESSMENT — PATIENT HEALTH QUESTIONNAIRE - PHQ9
SUM OF ALL RESPONSES TO PHQ9 QUESTIONS 1 & 2: 0
9. THOUGHTS THAT YOU WOULD BE BETTER OFF DEAD, OR OF HURTING YOURSELF: NOT AT ALL
1. LITTLE INTEREST OR PLEASURE IN DOING THINGS: NOT AT ALL
6. FEELING BAD ABOUT YOURSELF - OR THAT YOU ARE A FAILURE OR HAVE LET YOURSELF OR YOUR FAMILY DOWN: NOT AT ALL
SUM OF ALL RESPONSES TO PHQ QUESTIONS 1-9: 0
5. POOR APPETITE OR OVEREATING: NOT AT ALL
8. MOVING OR SPEAKING SO SLOWLY THAT OTHER PEOPLE COULD HAVE NOTICED. OR THE OPPOSITE, BEING SO FIGETY OR RESTLESS THAT YOU HAVE BEEN MOVING AROUND A LOT MORE THAN USUAL: NOT AT ALL
4. FEELING TIRED OR HAVING LITTLE ENERGY: NOT AT ALL
7. TROUBLE CONCENTRATING ON THINGS, SUCH AS READING THE NEWSPAPER OR WATCHING TELEVISION: NOT AT ALL
SUM OF ALL RESPONSES TO PHQ QUESTIONS 1-9: 0
2. FEELING DOWN, DEPRESSED OR HOPELESS: NOT AT ALL
3. TROUBLE FALLING OR STAYING ASLEEP: NOT AT ALL
10. IF YOU CHECKED OFF ANY PROBLEMS, HOW DIFFICULT HAVE THESE PROBLEMS MADE IT FOR YOU TO DO YOUR WORK, TAKE CARE OF THINGS AT HOME, OR GET ALONG WITH OTHER PEOPLE: NOT DIFFICULT AT ALL

## 2025-01-16 NOTE — PROGRESS NOTES
regimen, Norvasc refill sent to pharmacy.  Orders:  -     amLODIPine (NORVASC) 5 MG tablet; Take 1 tablet by mouth daily, Disp-90 tablet, R-1Normal  5. Ulcer of right foot with muscle involvement without evidence of necrosis (HCC)  Assessment & Plan:   Patient continues to follow closely with podiatry, she takes doxycycline prophylactically daily, refill sent to pharmacy.  Orders:  -     doxycycline hyclate (VIBRA-TABS) 100 MG tablet; Take 1 tablet by mouth 2 times daily, Disp-60 tablet, R-1Normal         Follow-up and Dispositions:  Return in about 3 months (around 4/16/2025) for labs.       I have reviewed the patient's medical history in detail and updated the computerized patient record.      We had a prolonged discussion about these complex clinical issues and went over the various important aspects to consider. All questions were answered.      Advised the patient to call back or return to office if symptoms do not improve, change in nature, or persist.     The patient was given an after visit summary or informed of [facility] which includes patient instructions, diagnoses, current medications, & vitals.    Patient expressed understanding with the diagnosis and plan.        Lisa Conrad MD

## 2025-01-17 LAB
CREAT UR-MCNC: 104 MG/DL
MICROALBUMIN UR-MCNC: 11.6 MG/DL
MICROALBUMIN/CREAT UR-RTO: 112 MG/G (ref 0–30)

## 2025-01-18 LAB
ANION GAP SERPL CALC-SCNC: 9 MMOL/L (ref 2–12)
BASOPHILS # BLD: 0.04 K/UL (ref 0–0.1)
BASOPHILS NFR BLD: 0.7 % (ref 0–1)
BUN SERPL-MCNC: 36 MG/DL (ref 6–20)
BUN/CREAT SERPL: 18 (ref 12–20)
CALCIUM SERPL-MCNC: 9.4 MG/DL (ref 8.5–10.1)
CHLORIDE SERPL-SCNC: 109 MMOL/L (ref 97–108)
CHOLEST SERPL-MCNC: 151 MG/DL
CO2 SERPL-SCNC: 24 MMOL/L (ref 21–32)
CREAT SERPL-MCNC: 1.99 MG/DL (ref 0.55–1.02)
DIFFERENTIAL METHOD BLD: ABNORMAL
EOSINOPHIL # BLD: 0.39 K/UL (ref 0–0.4)
EOSINOPHIL NFR BLD: 6.7 % (ref 0–7)
ERYTHROCYTE [DISTWIDTH] IN BLOOD BY AUTOMATED COUNT: 16.3 % (ref 11.5–14.5)
EST. AVERAGE GLUCOSE BLD GHB EST-MCNC: 111 MG/DL
GLUCOSE SERPL-MCNC: 155 MG/DL (ref 65–100)
HBA1C MFR BLD: 5.5 % (ref 4–5.6)
HCT VFR BLD AUTO: 38.3 % (ref 35–47)
HDLC SERPL-MCNC: 38 MG/DL
HDLC SERPL: 4 (ref 0–5)
HGB BLD-MCNC: 11.8 G/DL (ref 11.5–16)
IMM GRANULOCYTES # BLD AUTO: 0.02 K/UL (ref 0–0.04)
IMM GRANULOCYTES NFR BLD AUTO: 0.3 % (ref 0–0.5)
LDLC SERPL CALC-MCNC: 81 MG/DL (ref 0–100)
LYMPHOCYTES # BLD: 1.01 K/UL (ref 0.8–3.5)
LYMPHOCYTES NFR BLD: 17.2 % (ref 12–49)
MCH RBC QN AUTO: 27.3 PG (ref 26–34)
MCHC RBC AUTO-ENTMCNC: 30.8 G/DL (ref 30–36.5)
MCV RBC AUTO: 88.5 FL (ref 80–99)
MONOCYTES # BLD: 0.43 K/UL (ref 0–1)
MONOCYTES NFR BLD: 7.3 % (ref 5–13)
NEUTS SEG # BLD: 3.97 K/UL (ref 1.8–8)
NEUTS SEG NFR BLD: 67.8 % (ref 32–75)
NRBC # BLD: 0 K/UL (ref 0–0.01)
NRBC BLD-RTO: 0 PER 100 WBC
PLATELET # BLD AUTO: 177 K/UL (ref 150–400)
PMV BLD AUTO: 12.1 FL (ref 8.9–12.9)
POTASSIUM SERPL-SCNC: 3.2 MMOL/L (ref 3.5–5.1)
RBC # BLD AUTO: 4.33 M/UL (ref 3.8–5.2)
SODIUM SERPL-SCNC: 142 MMOL/L (ref 136–145)
TRIGL SERPL-MCNC: 160 MG/DL
VLDLC SERPL CALC-MCNC: 32 MG/DL
WBC # BLD AUTO: 5.9 K/UL (ref 3.6–11)

## 2025-01-20 PROBLEM — F51.01 PRIMARY INSOMNIA: Status: ACTIVE | Noted: 2025-01-20

## 2025-01-20 NOTE — ASSESSMENT & PLAN NOTE
Patient has longstanding history of chronic insomnia, she was started on Seroquel 25 mg which helps to control her symptoms, refill sent to pharmacy.

## 2025-01-20 NOTE — ASSESSMENT & PLAN NOTE
Patient continues to follow closely with podiatry, she takes doxycycline prophylactically daily, refill sent to pharmacy.

## 2025-01-20 NOTE — ASSESSMENT & PLAN NOTE
BMI in office today 34.16.  Her exercise is currently limited due to boot placement on her left foot.  Dietary modification encouraged.  Will continue to closely monitor.

## 2025-02-10 ENCOUNTER — TELEPHONE (OUTPATIENT)
Facility: CLINIC | Age: 75
End: 2025-02-10

## 2025-02-10 NOTE — ED PROVIDER NOTES
Chief complaint:   Chief Complaint   Patient presents with    Cough     cough and sore throat , congestion for several days - Entered by patient       Vitals:  Visit Vitals  /65 (BP Location: RUE - Right upper extremity, Patient Position: Sitting, Cuff Size: Large Adult) Comment: auto   Pulse 64   Temp 98.2 °F (36.8 °C) (Tympanic)   Resp 20   SpO2 98%       HISTORY OF PRESENT ILLNESS     HPI  Patient is a 76-year-old male presents for evaluation of 3 to 4 days of rhinorrhea postnasal drip body aches chills malaise.  States he has been taking some Afrin at night to help him sleep because of the congestion.  He also has only been taking some NyQuil.  He denies any objective or subjective fevers today.  Denies any nausea vomiting diarrhea shortness of breath wheezing or stridor.  Denies any arthralgias myalgias or rashes the body aches have resolved.  Other significant problems:  Patient Active Problem List    Diagnosis Date Noted    Lumbar back pain 12/06/2024     Priority: Low    Acute radicular low back pain 12/06/2024     Priority: Low    Environmental allergies 09/03/2020     Priority: Low    Microvascular angina (CMD) 09/03/2020     Priority: Low    Chronic upper back pain 10/29/2018     Priority: Low    Chronic nasal congestion 04/26/2018     Priority: Low    Essential hypertension 04/09/2018     Priority: Low    Rosacea 01/30/2018     Priority: Low    Chronic fatigue 01/30/2018     Priority: Low    Encounter for long-term (current) use of medications 01/24/2017     Priority: Low    PASCUAL on CPAP 01/24/2017     Priority: Low    Colon polyps 01/24/2017     Priority: Low     Colonoscopy 10-4-16      Mixed hyperlipidemia 01/24/2017     Priority: Low    Tinnitus of both ears 01/24/2017     Priority: Low    GERD without esophagitis 01/24/2017     Priority: Low    DDD (degenerative disc disease), cervical 01/24/2017     Priority: Low    Seborrheic dermatitis of scalp 01/24/2017     Priority: Low    Seborrheic  EMERGENCY DEPARTMENT HISTORY AND PHYSICAL EXAM      Date: 8/17/2019  Patient Name: Ping Rodriguez  Patient Age and Sex: 79 y.o. female    History of Presenting Illness     Chief Complaint   Patient presents with    Shortness of Breath       History Provided By: Patient    HPI: Ping Rodriguez, 79 y.o. female with history of CHF, asthma, complains of wheezing starting yesterday, gradually worsening. She took albuterol MDI at home with minimal relief. No chest pain. No fevers or sputum. She has a history of intubation which she says was due to CHF and not asthma. Location: Shortness of breath, no pain  Quality:    Moderate, wheezing  Severity: Moderate  Duration: 24 hours  Timing:    Recurrent  Context:  -  Modifying factors: Minimal improvement with albuterol at home  Associated symptoms: No chest pain    Pt denies any other alleviating or exacerbating factors. There are no other complaints, changes or physical findings at this time.      Past Medical History:   Diagnosis Date    Anxiety 1/22/2018    Arthritis     OA    Asthma     Diabetes ([facility] [address]     Essential hypertension     GERD (gastroesophageal reflux disease)     Hypercholesterolemia 1/22/2018    Hypertension     Ill-defined condition     diverticulitis    Long-term use of high-risk medication 1/22/2018    Neuropathy     Obesity (BMI 30-39.9) 4/30/2019    Other ill-defined conditions(799.89)     IBS, spinal stenosis    Plantar fasciitis     Psychiatric disorder     depression, anxiety    Sleep apnea     uses CPAP    Type 2 diabetes mellitus with diabetic neuropathy, without long-term current use of insulin ([address] 6/5/2016     Past Surgical History:   Procedure Laterality Date    CARDIAC SURG PROCEDURE UNLIST      stent    COLONOSCOPY N/A 3/14/2018    COLONOSCOPY performed by Tonia Wallace MD at [facility] ENDOSCOPY    HX APPENDECTOMY      HX CHOLECYSTECTOMY  11/15/2018    HX HYSTERECTOMY      HX PACEMAKER         PCP: Liana Garcia MD JARRED    Past History   Past Medical History:  Past Medical History:   Diagnosis Date    Anxiety 1/22/2018    Arthritis     OA    Asthma     Diabetes ([facility] [address]     Essential hypertension     GERD (gastroesophageal reflux disease)     Hypercholesterolemia 1/22/2018    Hypertension     Ill-defined condition     diverticulitis    Long-term use of high-risk medication 1/22/2018    Neuropathy     Obesity (BMI 30-39.9) 4/30/2019    Other ill-defined conditions(799.89)     IBS, spinal stenosis    Plantar fasciitis     Psychiatric disorder     depression, anxiety    Sleep apnea     uses CPAP    Type 2 diabetes mellitus with diabetic neuropathy, without long-term current use of insulin ([facility] [address] 6/5/2016       Past Surgical History:  Past Surgical History:   Procedure Laterality Date    CARDIAC SURG PROCEDURE UNLIST      stent    COLONOSCOPY N/A 3/14/2018    COL[facility]COPY performed by [facility] Gross [facility] at [facility] ENDOSCOPY    HX APPENDECTOMY      HX CHOLECYSTECTOMY  11/15/2018    HX HYSTERECTOMY      HX PACEMAKER         Family History:  Family History   Problem Relation Age of Onset    Arthritis-osteo Mother     Hypertension Mother     High Cholesterol Mother     [facility] Crohn's Disease Mother     Heart Disease Mother     Alcohol abuse Father     High Cholesterol Sister     Hypertension Sister     Thyroid Disease Sister     COPD Sister     High Cholesterol Brother     Hypertension Brother     COPD Brother     COPD Child     Inflammatory Bowel Dz Child        Social History:  Social History     Tobacco Use    Smoking status: Never Smoker    Smokeless tobacco: Never Used   Substance Use Topics    Alcohol use: No    Drug use: No       Allergies: Allergies   Allergen Reactions    Sulfa (Sulfonamide Antibiotics) Swelling    Amoxicillin Swelling       Current Medications:  No current facility-administered medications on file prior to encounter.       Current Outpatient Medications on File Prior to Encounter keratosis 01/24/2017     Priority: Low       PAST MEDICAL, FAMILY AND SOCIAL HISTORY     Medications:  Current Outpatient Medications   Medication Sig Dispense Refill    benzonatate (TESSALON PERLES) 200 MG capsule Take 1 capsule by mouth 3 times daily as needed for Cough. 20 capsule 0    promethazine-dextromethorphan (PROMETHAZINE-DM) 6.25-15 MG/5ML syrup Take 5 mLs by mouth at bedtime. 120 mL 1    atorvastatin (LIPITOR) 20 MG tablet TAKE 1 TABLET DAILY FOR CHOLESTEROL 90 tablet 0    tamsulosin (FLOMAX) 0.4 MG Cap Take 1 capsule by mouth daily after a meal. 90 capsule 0    diclofenac (VOLTAREN) 1 % gel Apply 4 g topically 4 times daily as needed (pain). 150 g 1    pantoprazole (PROTONIX) 40 MG tablet Take 1 tablet by mouth daily (before breakfast). 30 tablet 0    tiZANidine (ZANAFLEX) 2 MG tablet Take 1 tablet by mouth every 8 hours as needed for Muscle spasms. 90 tablet 0    HYDROcodone-acetaminophen (NORCO) 5-325 MG per tablet Take 1 tablet by mouth every 6 hours as needed for Pain.      lisinopril (ZESTRIL) 10 MG tablet Take 1 tablet by mouth daily. 90 tablet 3    cetirizine (ZyrTEC) 10 MG tablet Take 10 mg by mouth daily.      fluticasone (FLONASE) 50 MCG/ACT nasal spray Spray 1 spray in each nostril daily.      TURMERIC PO Take 1 capsule by mouth daily.      Multiple Vitamin (MULTIVITAMIN PO) Take 1 tablet by mouth daily.      metroNIDAZOLE (METROCREAM) 0.75 % cream Apply 1 application. topically daily as needed.       No current facility-administered medications for this visit.       Allergies:  ALLERGIES:  No Known Allergies    Past Medical  History/Surgeries:  Past Medical History:   Diagnosis Date    Cervical disc disease     Hyperlipidemia     PMH of 04/03/2018    Hepatitis C screen negative    Rosacea     Sleep apnea     uses CPAP       Past Surgical History:   Procedure Laterality Date    Cardiac catherization      Colonoscopy diagnostic  11/30/2021    hyperplastic polyp, Dr Cerna, 5 yr recall     Laparoscopy,rp ini ing hernia Left 2023    Dr. Denny Martin - Robot-assisted repair of indirect left inguinal hernia       Family History:  Family History   Problem Relation Age of Onset    Heart Mother     Atrial Fibrilliation Mother     Diabetes Father     Patient is unaware of any medical problems Brother     Patient is unaware of any medical problems Maternal Grandmother     Patient is unaware of any medical problems Maternal Grandfather     Patient is unaware of any medical problems Paternal Grandmother     Patient is unaware of any medical problems Paternal Grandfather        Social History:  Social History     Tobacco Use    Smoking status: Former     Current packs/day: 0.00     Average packs/day: 1 pack/day for 20.0 years (20.0 ttl pk-yrs)     Types: Cigarettes     Start date:      Quit date:      Years since quittin.1    Smokeless tobacco: Never   Substance Use Topics    Alcohol use: Yes     Alcohol/week: 2.0 - 3.0 standard drinks of alcohol     Types: 2 - 3 Glasses of wine per week       REVIEW OF SYSTEMS     Review of Systems  As above  PHYSICAL EXAM     Physical Exam  Constitutional:       General: He is not in acute distress.     Appearance: Normal appearance. He is well-developed. He is not ill-appearing or diaphoretic.   HENT:      Head: Normocephalic and atraumatic.      Right Ear: Hearing, tympanic membrane, ear canal and external ear normal. Tympanic membrane is not injected or erythematous.      Left Ear: Hearing, tympanic membrane, ear canal and external ear normal. Tympanic membrane is not injected or erythematous.      Nose: Rhinorrhea present.      Mouth/Throat:      Mouth: Mucous membranes are moist. No oral lesions.      Tongue: No lesions.      Palate: No lesions.      Pharynx: Oropharynx is clear. Uvula midline. No pharyngeal swelling, oropharyngeal exudate, posterior oropharyngeal erythema or uvula swelling.      Tonsils: No tonsillar exudate or tonsillar abscesses.      Medication Sig Dispense Refill    budesonide-formoterol (SYMBICORT) 160-4.5 mcg/actuation HFAA Take 2 Puffs by inhalation two (2) times a day. 1 Inhaler 0    clonazePAM (KLONOPIN) 0.5 mg tablet TAKE 1 TABLET EVERY NIGHT. MAX DAILY DOSE OF 0.5MG. 90 Tab 1    potassium chloride SR (KLOR-CON 10) 10 mEq tablet TAKE 1 TABLET EVERY DAY 90 Tab 0    lisinopril (PRINIVIL, ZESTRIL) 40 mg tablet Take 40 mg by mouth daily.  diphenhydrAMINE (BENADRYL) 25 mg capsule Take 25 mg by mouth two (2) times daily as needed (Allergies).  nystatin (MYCOSTATIN) topical cream Apply  to affected area two (2) times daily as needed for Skin Irritation (Rash).  traMADol (ULTRAM) 50 mg tablet Take 50 mg by mouth daily as needed for Pain (Used to supplement the Lyrica when lyrica doesnt manage the pain on its own).  albuterol (PROVENTIL HFA, VENTOLIN HFA, PROAIR HFA) 90 mcg/actuation inhaler Take 1 Puff by inhalation two (2) times a day. 1 Inhaler 6    dabigatran etexilate (PRADAXA) 150 mg capsule Take 1 Cap by mouth two (2) times a day. IF NO BLEEDING AT CATH SITE. 60 Cap 12    bumetanide (BUMEX) 2 mg tablet Take 1 Tab by mouth daily. 90 Tab 3    amLODIPine (NORVASC) 2.5 mg tablet Take 1 Tab by mouth daily. 90 Tab 3    omeprazole (PRILOSEC) 40 mg capsule Take 1 Cap by mouth daily. 90 Cap 3    metoprolol succinate (TOPROL-XL) 100 mg tablet Take 1 Tab by mouth daily. 30 Tab prn    acetaminophen (TYLENOL EXTRA STRENGTH) 500 mg tablet Take 1,000 mg by mouth every eight (8) hours as needed for Pain (Headache).  lidocaine (ASPERCREME, LIDOCAINE,) 4 % topical cream Apply  to affected area two (2) times daily as needed for Pain (Knee pain).  sodium chloride (AYR SALINE) 0.65 % nasal squeeze bottle 1 [place] by Both Nostrils route two (2) times a day.  conjugated estrogens (PREMARIN) 0.625 mg/gram vaginal cream Insert 0.5 g into vagina two (2) days a week.  Tuesdays and Thursdays      venlafaxine-SR [facility]  Neck: Normal range of motion and neck supple.   Eyes:      General: No scleral icterus.        Right eye: No discharge.         Left eye: No discharge.      Conjunctiva/sclera:      Right eye: Right conjunctiva is not injected.      Left eye: Left conjunctiva is not injected.      Pupils: Pupils are equal, round, and reactive to light.   Cardiovascular:      Rate and Rhythm: Normal rate and regular rhythm.      Heart sounds: Normal heart sounds. No murmur heard.     No friction rub. No gallop.   Pulmonary:      Effort: Pulmonary effort is normal. No respiratory distress.      Breath sounds: Normal breath sounds. No wheezing or rales.   Lymphadenopathy:      Cervical: No cervical adenopathy.   Skin:     Findings: No rash.   Neurological:      Mental Status: He is alert and oriented to person, place, and time.      GCS: GCS eye subscore is 4. GCS verbal subscore is 5. GCS motor subscore is 6.   Psychiatric:         Attention and Perception: Attention and perception normal.         Mood and Affect: Mood normal.         Speech: Speech normal.         ASSESSMENT/PLAN     Vital signs stable, nontoxic, nonacute distress.  We discussed risk benefits alternatives.  Discussed that symptoms are likely viral in nature and that antibiotics not indicated at this time.  Continue supportive care and expectant management.  Warned against recurrent use of Afrin and rebound congestion.  Discussed appropriate follow-up with PCP if failing to improve over the next 10 to 14 days.  Discussed sedating side effects of cough medicine he is requesting.  No alcohol or driving.    Olivier was seen today for cough.    Diagnoses and all orders for this visit:    Influenza-like illness    Rhinitis medicamentosa    Other orders  -     benzonatate (TESSALON PERLES) 200 MG capsule; Take 1 capsule by mouth 3 times daily as needed for Cough.  -     promethazine-dextromethorphan (PROMETHAZINE-DM) 6.25-15 MG/5ML syrup; Take 5 mLs by mouth at  XR) 150 mg capsule Take 150 mg by mouth two (2) times daily (with meals).  pregabalin (LYRICA) 200 mg capsule Take 200 mg by mouth two (2) times a day.  cholecalciferol, vitamin d3, (VITAMIN D3) 400 unit cap Take 400 Units by mouth daily.  aspirin 81 mg chewable tablet Take 81 mg by mouth daily.  atorvastatin (LIPITOR) 40 mg tablet Take 1 Tab by mouth nightly. 30 Tab 12       Review of Systems   Review of Systems   Respiratory: Positive for shortness of breath and wheezing. Cardiovascular: Negative for chest pain. All other systems reviewed and are negative. Physical Exam   Vital Signs  Patient Vitals for the past 24 hrs:   Temp Pulse Resp BP SpO2   08/17/19 0824     95 %   08/17/19 0756     93 %   08/17/19 0741 98.1 °F (36.7 °C) 80 26 (!) 183/151 (!) 88 %       Physical Exam   Constitutional: She is oriented to person, place, and time. She appears well-developed and well-nourished. No distress. HENT:   Head: Normocephalic and atraumatic. Eyes: Conjunctivae are normal. Right eye exhibits no discharge. Left eye exhibits no discharge. Neck: Normal range of motion. Neck supple. Cardiovascular: Normal rate, regular rhythm and normal heart sounds. No murmur heard. Pulmonary/Chest: Effort normal. No respiratory distress. She has wheezes. She has rales. Abdominal: Soft. She exhibits no distension. There is no tenderness. Musculoskeletal: Normal range of motion. She exhibits edema (2+ ble). She exhibits no deformity. Neurological: She is alert and oriented to person, place, and time. Skin: Skin is warm and dry. No rash noted. Psychiatric: She has a normal mood and affect. Her behavior is normal. Thought content normal.   Nursing note and vitals reviewed.       Diagnostic Study Results   Labs      Radiologic Studies  XR CHEST PA LAT    (Results Pending)     CT Results  (Last 48 hours)    None        CXR Results  (Last 48 hours)    None          Procedures Procedures    ED EKG interpretation:  Rhythm: paced; and regular . Rate (approx.): 80; Axis: right axis deviation; QRS interval: prolonged; ST/T wave: ; Other findings: Paced. This EKG was interpreted by Salvador Aldana M.D. Medical Decision Making     Provider Notes (Medical Decision Making):   80-year-old female with CHF, asthma, presented with wheezing and shortness of breath, no chest pain. She feels quite clear that her symptoms are more secondary to asthma rather than CHF. She states that her 2+ bilateral pitting edema is chronic and not significantly changed from baseline. She takes Bumex at baseline. We will give albuterol, Solu-Medrol, Bumex, check laboratories and chest x-ray. EKG and VBG are reassuring. Reevaluate after treatments. Salvador Aldana MD  8:35 AM    Patient is markedly improved, wheezing is essentially completely resolved at this time. Her BNP is chronically elevated, not significantly changed from baseline. No evidence of acute infection. She does not appear to have significant CHF component or pulmonary edema component to her complaint today. She will continue to take her Bumex as prescribed, no dosage change. Will add prednisone 40 mg daily x5 days, use albuterol as needed, and follow-up PMD.  Patient and her  feel that she is improved and they are comfortable discharging home. Tahira Frank MD      Room air O2 sat 89%, although pt looks and feels much improved. 92% saturation on 2L NC. Does not normally use O2 at home. Admit medicine.     Tahira Frank MD        Medications Administered During ED Course:  Medications   methylPREDNISolone (PF) (Solu-MEDROL) injection 125 mg (125 mg IntraVENous Given 8/17/19 0811)   bumetanide (BUMEX) injection 0.5 mg (0.5 mg IntraVENous Given 8/17/19 0811)   albuterol (PROVENTIL VENTOLIN) nebulizer solution 2.5 mg (2.5 mg Nebulization Given 8/17/19 0824)          Diagnosis     Disposition: bedtime.         Clinical Impression: No diagnosis found. Attestation:  I personally performed the services described in this documentation on this date 8/17/2019 for patient Ping Rodriguez. Joselyn Lamar MD        I was the first provider for this patient on this visit. To the best of my ability I reviewed relevant prior medical records, electrocardiograms, laboratories, and radiologic studies. The patient's presenting problems were discussed, and the patient was in agreement with the care plan formulated and outlined with them. Please note that this dictation was completed with Dragon voice recognition software. Quite often unanticipated grammatical, syntax, homophones, and other interpretive errors are inadvertently transcribed by the computer software. Please disregard these errors and excuse any errors that have escaped final proofreading.

## 2025-02-10 NOTE — TELEPHONE ENCOUNTER
Pharmacy: [facility] [address]    Patient called in stating that due to her neuropathy pain she would like to take her Pregabalin 75 mg prescription twice daily instead of once daily as once daily is not managing her pain. Would to see if Dr. Conrad is agreeable to this change?

## 2025-02-11 NOTE — TELEPHONE ENCOUNTER
I would not recommend increasing the dose at this time. Patient was recently hospitalized for respiratory failure and this medication can increase her risk of respiratory depression. Given age and multiple medications she is already taking, I would recommend conservative measures at this time. Recommend scheduling an appointment in the event she has worsening of symptoms.   ___________  How is peripheral neuropathy treated?  There is no clear treatment that will reverse nerve  damage. Treatment, though, can help keep symptoms  from getting worse and often involve a combination of  medications and physical activities. Talk to your doctor  about medication options.  Physical therapy (PT) is one of the most effective  treatments for neuropathy - it can help with balance,  strength and safety. Occupational therapy (OT) can  also help patients improve fine motor skills, like  buttoning shirts. Talk to your doctor about a referral  to a physical and/or occupational therapist.  Other therapies can also help with the side effects of  neuropathy, including:  · low-impact exercise  · swimming  · biking  · acupuncture  · relaxation techniques  · guided imagery exercises  It is also important to avoid alcohol and smoking, as they  can make neuropathy worse.  It is very important that you protect hands and feet  from injury because of loss of sensation/numbness.  The following suggestions can help:  · Use gloves to protect your hands when doing yard  work or household repairs.  · Be extra cautious with sharp objects.  · Use well-padded potholders when cooking.  · Inspect your fingers and feet regularly for cuts  and scrapes.  · Check the weather and wear gloves and warm socks

## 2025-02-13 DIAGNOSIS — E11.42 DIABETIC POLYNEUROPATHY ASSOCIATED WITH TYPE 2 DIABETES MELLITUS (HCC): ICD-10-CM

## 2025-02-13 NOTE — TELEPHONE ENCOUNTER
Spoke with patient about your orders on not upping the dosage at this time, Pt voiced understanding. Please fill. Thankyou.    PCP: Lisa Conrad MD    LAST APPT:1/16/2025    Future Appointments   Date Time Provider Department Center   4/16/2025  8:00 AM Lisa Conrad MD [facility] DEP       Requested Prescriptions     Pending Prescriptions Disp Refills    pregabalin (LYRICA) 75 MG capsule 90 capsule 3     Sig: Take 1 capsule by mouth daily for 360 days. Max Daily Amount: 75 mg

## 2025-02-14 ENCOUNTER — TELEPHONE (OUTPATIENT)
Facility: CLINIC | Age: 75
End: 2025-02-14

## 2025-02-14 RX ORDER — PREGABALIN 75 MG/1
75 CAPSULE ORAL DAILY
Qty: 30 CAPSULE | Refills: 0 | Status: SHIPPED | OUTPATIENT
Start: 2025-02-14 | End: 2025-03-16

## 2025-03-26 ENCOUNTER — TELEPHONE (OUTPATIENT)
Facility: CLINIC | Age: 75
End: 2025-03-26

## 2025-03-26 NOTE — TELEPHONE ENCOUNTER
Pharmacy: [facility] [address], patient has nine days left.    doxycycline hyclate (VIBRA-TABS) 100 MG tablet [[id number]]      Order Details  Dose: 100 mg Route: Oral Frequency: 2 TIMES DAILY   Dispense Quantity: 60 tablet Refills: 1          Sig: Take 1 tablet by mouth 2 times daily

## 2025-03-27 RX ORDER — DOXYCYCLINE HYCLATE 100 MG
100 TABLET ORAL 2 TIMES DAILY
Qty: 180 TABLET | Refills: 0 | Status: SHIPPED | OUTPATIENT
Start: 2025-03-27 | End: 2025-06-25

## 2025-03-27 NOTE — TELEPHONE ENCOUNTER
Adriel Rodriguez, APRN - NP  You1 hour ago (11:20 AM)     HK  You can call for 90 day supplely without refill under Dr. Cornelius. Pls provide an apportionment with Dr. Cornelius in near future.

## 2025-03-27 NOTE — TELEPHONE ENCOUNTER
Pt is requesting a refill of doxycycline hyclate (VIBRA-TABS) 100 MG tablet; Pt is stating that Dr. Cornelius is in charge of this medication; Pt was r/s from 01/23/2025 to 04/24/2025; Please Advise    NOV: 04/24/2025    Medication: doxycycline hyclate (VIBRA-TABS) 100 MG tablet   Quantity: 60    Pharmacy: [address] - P [phone] - F [phone] [[id number]]

## 2025-04-08 NOTE — PROGRESS NOTES
RT Note: Spoke with pt regarding use of CPAP in ED setting. Pt stated that she was doing fine and would not need to use CPAP tonite. I reminded her to have a family member bring in her CPAP from home to use in hospital setting. Pt awake and alert, NAD. I would have a CPAP unit if needed while in ED.  Findings discussed with RN as well No

## 2025-04-24 ENCOUNTER — OFFICE VISIT (OUTPATIENT)
Age: 75
End: 2025-04-24
Payer: MEDICARE

## 2025-04-24 VITALS
TEMPERATURE: 97 F | DIASTOLIC BLOOD PRESSURE: 76 MMHG | BODY MASS INDEX: 37.2 KG/M2 | SYSTOLIC BLOOD PRESSURE: 143 MMHG | WEIGHT: 237.5 LBS | HEART RATE: 67 BPM

## 2025-04-24 DIAGNOSIS — M86.272 SUBACUTE OSTEOMYELITIS OF LEFT ANKLE (HCC): ICD-10-CM

## 2025-04-24 DIAGNOSIS — B37.31 VULVOVAGINAL CANDIDIASIS: Primary | ICD-10-CM

## 2025-04-24 PROCEDURE — 3077F SYST BP >= 140 MM HG: CPT | Performed by: INTERNAL MEDICINE

## 2025-04-24 PROCEDURE — 1123F ACP DISCUSS/DSCN MKR DOCD: CPT | Performed by: INTERNAL MEDICINE

## 2025-04-24 PROCEDURE — G8427 DOCREV CUR MEDS BY ELIG CLIN: HCPCS | Performed by: INTERNAL MEDICINE

## 2025-04-24 PROCEDURE — 3078F DIAST BP <80 MM HG: CPT | Performed by: INTERNAL MEDICINE

## 2025-04-24 PROCEDURE — 99214 OFFICE O/P EST MOD 30 MIN: CPT | Performed by: INTERNAL MEDICINE

## 2025-04-24 PROCEDURE — G8417 CALC BMI ABV UP PARAM F/U: HCPCS | Performed by: INTERNAL MEDICINE

## 2025-04-24 PROCEDURE — G8399 PT W/DXA RESULTS DOCUMENT: HCPCS | Performed by: INTERNAL MEDICINE

## 2025-04-24 PROCEDURE — 3017F COLORECTAL CA SCREEN DOC REV: CPT | Performed by: INTERNAL MEDICINE

## 2025-04-24 PROCEDURE — 1090F PRES/ABSN URINE INCON ASSESS: CPT | Performed by: INTERNAL MEDICINE

## 2025-04-24 PROCEDURE — 1159F MED LIST DOCD IN RCRD: CPT | Performed by: INTERNAL MEDICINE

## 2025-04-24 PROCEDURE — 1126F AMNT PAIN NOTED NONE PRSNT: CPT | Performed by: INTERNAL MEDICINE

## 2025-04-24 PROCEDURE — 1036F TOBACCO NON-USER: CPT | Performed by: INTERNAL MEDICINE

## 2025-04-24 RX ORDER — KETOCONAZOLE 20 MG/G
CREAM TOPICAL
Qty: 30 G | Refills: 1 | Status: SHIPPED | OUTPATIENT
Start: 2025-04-24

## 2025-04-24 RX ORDER — DOXYCYCLINE HYCLATE 100 MG
100 TABLET ORAL 2 TIMES DAILY
Qty: 180 TABLET | Refills: 3 | Status: SHIPPED | OUTPATIENT
Start: 2025-04-24 | End: 2026-04-19

## 2025-04-24 RX ORDER — FLUCONAZOLE 150 MG/1
150 TABLET ORAL
Qty: 3 TABLET | Refills: 1 | Status: SHIPPED | OUTPATIENT
Start: 2025-04-24 | End: 2025-04-27

## 2025-04-24 ASSESSMENT — PATIENT HEALTH QUESTIONNAIRE - PHQ9
1. LITTLE INTEREST OR PLEASURE IN DOING THINGS: NOT AT ALL
2. FEELING DOWN, DEPRESSED OR HOPELESS: NOT AT ALL
SUM OF ALL RESPONSES TO PHQ QUESTIONS 1-9: 0

## 2025-04-24 NOTE — PROGRESS NOTES
Chief Complaint   Patient presents with    Follow-Up from Hospital     1. Have you been to the ER, urgent care clinic since your last visit?  Hospitalized since your last visit?No    2. Have you seen or consulted any other health care providers outside of the [facility] since your last visit?  Include any pap smears or colon screening. No    
taking: Reported on 4/24/2025) 28.35 g 0    Lidocaine, Anorectal, 5 % CREA Apply topically in the morning and at bedtime (Patient not taking: Reported on 4/24/2025)       No current facility-administered medications for this visit.        Thank you for the referral.    Houston Cornelius MD

## 2025-04-28 DIAGNOSIS — B95.62 MRSA BACTEREMIA: ICD-10-CM

## 2025-04-28 DIAGNOSIS — R78.81 MRSA BACTEREMIA: ICD-10-CM

## 2025-04-28 DIAGNOSIS — A49.02 MRSA INFECTION: Primary | ICD-10-CM

## 2025-04-28 NOTE — TELEPHONE ENCOUNTER
Could you confirm these are the directions for this med?     \"  doxycycline hyclate (VIBRA-TABS) 100 MG tablet [[id number]]    Order Details  Dose: 100 mg Route: Oral Frequency: 2 TIMES DAILY   Dispense Quantity: 180 tablet Refills: 3    Note to Pharmacy: This medication can interact with tube feedings obtain MD order to manage. Recommend holding TF for 1 h before and 2 h after dose. Take 1 h before or 2 h after dairy, calcium, iron, magnesium, aluminum or zinc.

## 2025-04-28 NOTE — TELEPHONE ENCOUNTER
Question about script getting refilled. \"Incorrect script\" through [facility] pharmacy.     doxycycline hyclate (VIBRA-TABS) 100 MG tablet

## 2025-04-29 RX ORDER — DOXYCYCLINE HYCLATE 100 MG
100 TABLET ORAL 2 TIMES DAILY
Qty: 180 TABLET | Refills: 3 | OUTPATIENT
Start: 2025-04-29 | End: 2026-04-24

## 2025-05-02 ENCOUNTER — OFFICE VISIT (OUTPATIENT)
Facility: CLINIC | Age: 75
End: 2025-05-02
Payer: MEDICARE

## 2025-05-02 VITALS
SYSTOLIC BLOOD PRESSURE: 138 MMHG | DIASTOLIC BLOOD PRESSURE: 80 MMHG | OXYGEN SATURATION: 94 % | WEIGHT: 225 LBS | HEART RATE: 79 BPM | BODY MASS INDEX: 35.24 KG/M2

## 2025-05-02 DIAGNOSIS — I10 ESSENTIAL HYPERTENSION: Chronic | ICD-10-CM

## 2025-05-02 PROCEDURE — G8417 CALC BMI ABV UP PARAM F/U: HCPCS | Performed by: STUDENT IN AN ORGANIZED HEALTH CARE EDUCATION/TRAINING PROGRAM

## 2025-05-02 PROCEDURE — 1036F TOBACCO NON-USER: CPT | Performed by: STUDENT IN AN ORGANIZED HEALTH CARE EDUCATION/TRAINING PROGRAM

## 2025-05-02 PROCEDURE — 3075F SYST BP GE 130 - 139MM HG: CPT | Performed by: STUDENT IN AN ORGANIZED HEALTH CARE EDUCATION/TRAINING PROGRAM

## 2025-05-02 PROCEDURE — 1123F ACP DISCUSS/DSCN MKR DOCD: CPT | Performed by: STUDENT IN AN ORGANIZED HEALTH CARE EDUCATION/TRAINING PROGRAM

## 2025-05-02 PROCEDURE — G8427 DOCREV CUR MEDS BY ELIG CLIN: HCPCS | Performed by: STUDENT IN AN ORGANIZED HEALTH CARE EDUCATION/TRAINING PROGRAM

## 2025-05-02 PROCEDURE — 3017F COLORECTAL CA SCREEN DOC REV: CPT | Performed by: STUDENT IN AN ORGANIZED HEALTH CARE EDUCATION/TRAINING PROGRAM

## 2025-05-02 PROCEDURE — 3079F DIAST BP 80-89 MM HG: CPT | Performed by: STUDENT IN AN ORGANIZED HEALTH CARE EDUCATION/TRAINING PROGRAM

## 2025-05-02 PROCEDURE — 99214 OFFICE O/P EST MOD 30 MIN: CPT | Performed by: STUDENT IN AN ORGANIZED HEALTH CARE EDUCATION/TRAINING PROGRAM

## 2025-05-02 PROCEDURE — 1090F PRES/ABSN URINE INCON ASSESS: CPT | Performed by: STUDENT IN AN ORGANIZED HEALTH CARE EDUCATION/TRAINING PROGRAM

## 2025-05-02 PROCEDURE — G8399 PT W/DXA RESULTS DOCUMENT: HCPCS | Performed by: STUDENT IN AN ORGANIZED HEALTH CARE EDUCATION/TRAINING PROGRAM

## 2025-05-02 NOTE — PROGRESS NOTES
Chief Complaint   Patient presents with    3 Month Follow-Up    Diabetes         Health Maintenance Due   Topic Date Due    DTaP/Tdap/Td vaccine (1 - Tdap) Never done    Hepatitis B vaccine (1 of 3 - Risk Dialysis 4-dose series) Never done    Shingles vaccine (1 of 2) Never done    Respiratory Syncytial Virus (RSV) Pregnant or age 60 yrs+ (1 - Risk 60-74 years 1-dose series) Never done    Pneumococcal 50+ years Vaccine (2 of 2 - PPSV23) 12/06/2019    Diabetic retinal exam  12/11/2020    Annual Wellness Visit (Medicare)  07/28/2024    COVID-19 Vaccine (5 - 2024-25 season) 03/19/2025    Breast cancer screen  04/27/2025         \"Have you been to the ER, urgent care clinic since your last visit?  Hospitalized since your last visit?\"    NO    “Have you seen or consulted any other health care providers outside of [facility] since your last visit?”    NO       Have you had a mammogram?”   NO    Date of last Mammogram: 4/27/2023

## 2025-05-05 RX ORDER — AMLODIPINE BESYLATE 5 MG/1
5 TABLET ORAL DAILY
Qty: 90 TABLET | Refills: 3 | Status: SHIPPED | OUTPATIENT
Start: 2025-05-05

## 2025-05-08 ENCOUNTER — TELEPHONE (OUTPATIENT)
Facility: CLINIC | Age: 75
End: 2025-05-08

## 2025-05-08 NOTE — TELEPHONE ENCOUNTER
Patient's  wanted to notify provider the patient completed blood work at [facility] yesterday but they did not have the fax number to our office to give [facility].  [phone]

## 2025-05-12 DIAGNOSIS — F51.01 PRIMARY INSOMNIA: ICD-10-CM

## 2025-05-12 NOTE — TELEPHONE ENCOUNTER
Patient's  is requesting refill on Quetiapine medication sent to [facility] (on file).  [phone]

## 2025-05-13 RX ORDER — QUETIAPINE FUMARATE 25 MG/1
25 TABLET, FILM COATED ORAL
Qty: 90 TABLET | Refills: 1 | Status: SHIPPED | OUTPATIENT
Start: 2025-05-13

## 2025-05-30 ENCOUNTER — TELEPHONE (OUTPATIENT)
Facility: CLINIC | Age: 75
End: 2025-05-30

## 2025-06-09 ENCOUNTER — CLINICAL DOCUMENTATION (OUTPATIENT)
Facility: CLINIC | Age: 75
End: 2025-06-09

## 2025-06-09 PROBLEM — D63.1 ANEMIA IN END-STAGE RENAL DISEASE (HCC): Status: ACTIVE | Noted: 2024-05-29

## 2025-06-09 PROBLEM — E11.8 CONTROLLED DIABETES MELLITUS TYPE 2 WITH COMPLICATIONS (HCC): Status: RESOLVED | Noted: 2024-10-25 | Resolved: 2025-06-09

## 2025-06-09 PROBLEM — T84.7XXA HARDWARE COMPLICATING WOUND INFECTION: Status: RESOLVED | Noted: 2024-04-10 | Resolved: 2025-06-09

## 2025-06-09 PROBLEM — L30.4 INTERTRIGINOUS DERMATITIS ASSOCIATED WITH MOISTURE: Status: RESOLVED | Noted: 2023-07-28 | Resolved: 2025-06-09

## 2025-06-09 PROBLEM — I50.32 CHRONIC DIASTOLIC (CONGESTIVE) HEART FAILURE (HCC): Status: ACTIVE | Noted: 2024-08-20

## 2025-06-09 PROBLEM — N18.4 CKD (CHRONIC KIDNEY DISEASE) STAGE 4, GFR 15-29 ML/MIN (HCC): Status: ACTIVE | Noted: 2025-06-09

## 2025-06-09 PROBLEM — S82.842A CLOSED BIMALLEOLAR FRACTURE OF LEFT ANKLE: Status: RESOLVED | Noted: 2024-03-22 | Resolved: 2025-06-09

## 2025-06-09 PROBLEM — J44.1 ACUTE EXACERBATION OF COPD WITH ASTHMA (HCC): Status: RESOLVED | Noted: 2019-08-17 | Resolved: 2025-06-09

## 2025-06-09 PROBLEM — N17.9 AKI (ACUTE KIDNEY INJURY): Status: RESOLVED | Noted: 2024-04-25 | Resolved: 2025-06-09

## 2025-06-09 PROBLEM — I50.9 ACUTE HEART FAILURE, UNSPECIFIED HEART FAILURE TYPE (HCC): Status: RESOLVED | Noted: 2024-08-29 | Resolved: 2025-06-09

## 2025-06-09 PROBLEM — N39.0 URINARY TRACT INFECTION WITHOUT HEMATURIA: Status: RESOLVED | Noted: 2024-06-24 | Resolved: 2025-06-09

## 2025-06-09 PROBLEM — B95.62 MRSA BACTEREMIA: Status: RESOLVED | Noted: 2023-12-21 | Resolved: 2025-06-09

## 2025-06-09 PROBLEM — Z79.899 POLYPHARMACY: Status: RESOLVED | Noted: 2021-05-19 | Resolved: 2025-06-09

## 2025-06-09 PROBLEM — Z91.81 AT HIGH RISK FOR FALLS: Status: RESOLVED | Noted: 2024-09-19 | Resolved: 2025-06-09

## 2025-06-09 PROBLEM — K64.9 ACUTE HEMORRHOID: Status: RESOLVED | Noted: 2024-06-05 | Resolved: 2025-06-09

## 2025-06-09 PROBLEM — I50.9 CONGESTIVE HEART FAILURE (HCC): Status: RESOLVED | Noted: 2023-12-09 | Resolved: 2025-06-09

## 2025-06-09 PROBLEM — R53.83 LETHARGY: Status: RESOLVED | Noted: 2021-01-01 | Resolved: 2025-06-09

## 2025-06-09 PROBLEM — Z95.0 S/P PLACEMENT OF CARDIAC PACEMAKER: Chronic | Status: RESOLVED | Noted: 2018-11-13 | Resolved: 2025-06-09

## 2025-06-09 PROBLEM — S81.802A OPEN WOUND OF LEFT LOWER LEG: Status: RESOLVED | Noted: 2021-03-31 | Resolved: 2025-06-09

## 2025-06-09 PROBLEM — T81.49XA SURGICAL SITE INFECTION: Status: RESOLVED | Noted: 2024-04-07 | Resolved: 2025-06-09

## 2025-06-09 PROBLEM — N18.31 STAGE 3A CHRONIC KIDNEY DISEASE (HCC): Chronic | Status: RESOLVED | Noted: 2018-11-13 | Resolved: 2025-06-09

## 2025-06-09 PROBLEM — A49.02 MRSA INFECTION: Status: RESOLVED | Noted: 2024-04-07 | Resolved: 2025-06-09

## 2025-06-09 PROBLEM — M86.062 ACUTE HEMATOGENOUS OSTEOMYELITIS OF LEFT TIBIA (HCC): Status: RESOLVED | Noted: 2024-10-25 | Resolved: 2025-06-09

## 2025-06-09 PROBLEM — N18.6 ANEMIA IN END-STAGE RENAL DISEASE (HCC): Status: ACTIVE | Noted: 2024-05-29

## 2025-06-09 PROBLEM — L60.0 INGROWN TOENAIL OF LEFT FOOT: Status: RESOLVED | Noted: 2019-12-17 | Resolved: 2025-06-09

## 2025-06-09 PROBLEM — R60.0 LOWER EXTREMITY EDEMA: Status: RESOLVED | Noted: 2019-12-17 | Resolved: 2025-06-09

## 2025-06-09 PROBLEM — N39.0 COMPLICATED UTI (URINARY TRACT INFECTION): Status: RESOLVED | Noted: 2024-08-15 | Resolved: 2025-06-09

## 2025-06-09 PROBLEM — I38 ENDOCARDITIS: Status: RESOLVED | Noted: 2023-12-26 | Resolved: 2025-06-09

## 2025-06-09 PROBLEM — J96.01 ACUTE RESPIRATORY FAILURE WITH HYPOXIA (HCC): Status: RESOLVED | Noted: 2023-12-08 | Resolved: 2025-06-09

## 2025-06-09 PROBLEM — Z98.1 S/P ANKLE ARTHRODESIS: Status: RESOLVED | Noted: 2024-06-28 | Resolved: 2025-06-09

## 2025-06-09 PROBLEM — R06.09 DOE (DYSPNEA ON EXERTION): Status: RESOLVED | Noted: 2025-01-04 | Resolved: 2025-06-09

## 2025-06-09 PROBLEM — R78.81 MRSA BACTEREMIA: Status: RESOLVED | Noted: 2023-12-21 | Resolved: 2025-06-09

## 2025-07-09 DIAGNOSIS — K58.9 IRRITABLE BOWEL SYNDROME, UNSPECIFIED TYPE: ICD-10-CM

## 2025-07-09 NOTE — TELEPHONE ENCOUNTER
Routing to new PCP    Last appointment: 5/2/25  Next appointment: 8/4/25  Previous refill encounter(s): 9/19/24 #90 with 3 refills    Requested Prescriptions     Pending Prescriptions Disp Refills    omeprazole (PRILOSEC) 40 MG delayed release capsule [Pharmacy Med Name: Omeprazole Oral Capsule Delayed Release 40 MG] 90 capsule 3     Sig: TAKE 1 CAPSULE EVERY DAY         For Pharmacy Admin Tracking Only    Program: Medication Refill  CPA in place:    Recommendation Provided To:   Intervention Detail: New Rx: 1, reason: Patient Preference  Intervention Accepted By:   Gap Closed?:    Time Spent (min): 5

## 2025-07-15 RX ORDER — OMEPRAZOLE 40 MG/1
40 CAPSULE, DELAYED RELEASE ORAL DAILY
Qty: 90 CAPSULE | Refills: 0 | Status: SHIPPED | OUTPATIENT
Start: 2025-07-15

## 2025-07-21 DIAGNOSIS — I25.118 CORONARY ARTERY DISEASE OF NATIVE HEART WITH STABLE ANGINA PECTORIS, UNSPECIFIED VESSEL OR LESION TYPE: ICD-10-CM

## 2025-07-21 RX ORDER — CARVEDILOL 25 MG/1
25 TABLET ORAL 2 TIMES DAILY
Qty: 180 TABLET | Refills: 0 | Status: SHIPPED | OUTPATIENT
Start: 2025-07-21

## 2025-07-21 RX ORDER — ISOSORBIDE MONONITRATE 60 MG/1
60 TABLET, EXTENDED RELEASE ORAL DAILY
Qty: 90 TABLET | Refills: 1 | Status: SHIPPED | OUTPATIENT
Start: 2025-07-21

## 2025-08-04 ENCOUNTER — OFFICE VISIT (OUTPATIENT)
Facility: CLINIC | Age: 75
End: 2025-08-04
Payer: MEDICARE

## 2025-08-04 VITALS
RESPIRATION RATE: 16 BRPM | OXYGEN SATURATION: 97 % | WEIGHT: 248.1 LBS | SYSTOLIC BLOOD PRESSURE: 132 MMHG | BODY MASS INDEX: 38.94 KG/M2 | DIASTOLIC BLOOD PRESSURE: 74 MMHG | HEART RATE: 80 BPM | TEMPERATURE: 98.7 F | HEIGHT: 67 IN

## 2025-08-04 DIAGNOSIS — I10 ESSENTIAL HYPERTENSION: Chronic | ICD-10-CM

## 2025-08-04 DIAGNOSIS — E11.40 TYPE 2 DIABETES MELLITUS WITH DIABETIC NEUROPATHY, WITHOUT LONG-TERM CURRENT USE OF INSULIN (HCC): Primary | Chronic | ICD-10-CM

## 2025-08-04 DIAGNOSIS — N18.4 CKD (CHRONIC KIDNEY DISEASE) STAGE 4, GFR 15-29 ML/MIN (HCC): ICD-10-CM

## 2025-08-04 DIAGNOSIS — E66.01 MORBID (SEVERE) OBESITY DUE TO EXCESS CALORIES (HCC): ICD-10-CM

## 2025-08-04 DIAGNOSIS — J44.9 CHRONIC OBSTRUCTIVE PULMONARY DISEASE, UNSPECIFIED COPD TYPE (HCC): ICD-10-CM

## 2025-08-04 DIAGNOSIS — I48.11 LONGSTANDING PERSISTENT ATRIAL FIBRILLATION (HCC): ICD-10-CM

## 2025-08-04 PROBLEM — N18.6 ESRD (END STAGE RENAL DISEASE) ON DIALYSIS (HCC): Chronic | Status: RESOLVED | Noted: 2024-05-20 | Resolved: 2025-08-04

## 2025-08-04 PROBLEM — D63.1 ANEMIA IN END-STAGE RENAL DISEASE (HCC): Status: RESOLVED | Noted: 2024-05-29 | Resolved: 2025-08-04

## 2025-08-04 PROBLEM — E11.65 POORLY CONTROLLED DIABETES MELLITUS (HCC): Status: RESOLVED | Noted: 2023-12-21 | Resolved: 2025-08-04

## 2025-08-04 PROBLEM — Z99.2 ESRD (END STAGE RENAL DISEASE) ON DIALYSIS (HCC): Chronic | Status: RESOLVED | Noted: 2024-05-20 | Resolved: 2025-08-04

## 2025-08-04 PROBLEM — N18.6 ANEMIA IN END-STAGE RENAL DISEASE (HCC): Status: RESOLVED | Noted: 2024-05-29 | Resolved: 2025-08-04

## 2025-08-04 PROCEDURE — 3023F SPIROM DOC REV: CPT | Performed by: STUDENT IN AN ORGANIZED HEALTH CARE EDUCATION/TRAINING PROGRAM

## 2025-08-04 PROCEDURE — G8399 PT W/DXA RESULTS DOCUMENT: HCPCS | Performed by: STUDENT IN AN ORGANIZED HEALTH CARE EDUCATION/TRAINING PROGRAM

## 2025-08-04 PROCEDURE — 99214 OFFICE O/P EST MOD 30 MIN: CPT | Performed by: STUDENT IN AN ORGANIZED HEALTH CARE EDUCATION/TRAINING PROGRAM

## 2025-08-04 PROCEDURE — 1159F MED LIST DOCD IN RCRD: CPT | Performed by: STUDENT IN AN ORGANIZED HEALTH CARE EDUCATION/TRAINING PROGRAM

## 2025-08-04 PROCEDURE — 1090F PRES/ABSN URINE INCON ASSESS: CPT | Performed by: STUDENT IN AN ORGANIZED HEALTH CARE EDUCATION/TRAINING PROGRAM

## 2025-08-04 PROCEDURE — 1036F TOBACCO NON-USER: CPT | Performed by: STUDENT IN AN ORGANIZED HEALTH CARE EDUCATION/TRAINING PROGRAM

## 2025-08-04 PROCEDURE — 1123F ACP DISCUSS/DSCN MKR DOCD: CPT | Performed by: STUDENT IN AN ORGANIZED HEALTH CARE EDUCATION/TRAINING PROGRAM

## 2025-08-04 PROCEDURE — 1126F AMNT PAIN NOTED NONE PRSNT: CPT | Performed by: STUDENT IN AN ORGANIZED HEALTH CARE EDUCATION/TRAINING PROGRAM

## 2025-08-04 PROCEDURE — 3078F DIAST BP <80 MM HG: CPT | Performed by: STUDENT IN AN ORGANIZED HEALTH CARE EDUCATION/TRAINING PROGRAM

## 2025-08-04 PROCEDURE — G8417 CALC BMI ABV UP PARAM F/U: HCPCS | Performed by: STUDENT IN AN ORGANIZED HEALTH CARE EDUCATION/TRAINING PROGRAM

## 2025-08-04 PROCEDURE — 3075F SYST BP GE 130 - 139MM HG: CPT | Performed by: STUDENT IN AN ORGANIZED HEALTH CARE EDUCATION/TRAINING PROGRAM

## 2025-08-04 PROCEDURE — G8427 DOCREV CUR MEDS BY ELIG CLIN: HCPCS | Performed by: STUDENT IN AN ORGANIZED HEALTH CARE EDUCATION/TRAINING PROGRAM

## 2025-08-04 PROCEDURE — 1160F RVW MEDS BY RX/DR IN RCRD: CPT | Performed by: STUDENT IN AN ORGANIZED HEALTH CARE EDUCATION/TRAINING PROGRAM

## 2025-08-04 PROCEDURE — 3044F HG A1C LEVEL LT 7.0%: CPT | Performed by: STUDENT IN AN ORGANIZED HEALTH CARE EDUCATION/TRAINING PROGRAM

## 2025-08-04 PROCEDURE — 2022F DILAT RTA XM EVC RTNOPTHY: CPT | Performed by: STUDENT IN AN ORGANIZED HEALTH CARE EDUCATION/TRAINING PROGRAM

## 2025-08-04 PROCEDURE — 3017F COLORECTAL CA SCREEN DOC REV: CPT | Performed by: STUDENT IN AN ORGANIZED HEALTH CARE EDUCATION/TRAINING PROGRAM

## 2025-08-04 RX ORDER — BUMETANIDE 2 MG/1
2 TABLET ORAL DAILY
Qty: 30 TABLET | Refills: 3 | Status: SHIPPED | OUTPATIENT
Start: 2025-08-04

## 2025-08-05 LAB
ALBUMIN SERPL-MCNC: 3.1 G/DL (ref 3.5–5.2)
ALBUMIN/GLOB SERPL: 1.3 (ref 1.1–2.2)
ALP SERPL-CCNC: 181 U/L (ref 35–104)
ALT SERPL-CCNC: 18 U/L (ref 10–50)
ANION GAP SERPL CALC-SCNC: 10 MMOL/L (ref 2–14)
AST SERPL-CCNC: 21 U/L (ref 10–35)
BILIRUB SERPL-MCNC: 0.8 MG/DL (ref 0–1.2)
BUN SERPL-MCNC: 28 MG/DL (ref 8–23)
BUN/CREAT SERPL: 16 (ref 12–20)
CALCIUM SERPL-MCNC: 9.1 MG/DL (ref 8.8–10.2)
CHLORIDE SERPL-SCNC: 111 MMOL/L (ref 98–107)
CO2 SERPL-SCNC: 27 MMOL/L (ref 20–29)
CREAT SERPL-MCNC: 1.75 MG/DL (ref 0.6–1)
EST. AVERAGE GLUCOSE BLD GHB EST-MCNC: 145 MG/DL
GLOBULIN SER CALC-MCNC: 2.3 G/DL (ref 2–4)
GLUCOSE SERPL-MCNC: 146 MG/DL (ref 65–100)
HBA1C MFR BLD: 6.7 % (ref 4–5.6)
POTASSIUM SERPL-SCNC: 3.5 MMOL/L (ref 3.5–5.1)
PROT SERPL-MCNC: 5.3 G/DL (ref 6.4–8.3)
SODIUM SERPL-SCNC: 148 MMOL/L (ref 136–145)

## 2025-08-08 ENCOUNTER — OFFICE VISIT (OUTPATIENT)
Age: 75
End: 2025-08-08
Payer: MEDICARE

## 2025-08-08 DIAGNOSIS — E11.65 TYPE 2 DIABETES MELLITUS WITH HYPERGLYCEMIA, WITHOUT LONG-TERM CURRENT USE OF INSULIN (HCC): Primary | ICD-10-CM

## 2025-08-08 PROCEDURE — G0108 DIAB MANAGE TRN  PER INDIV: HCPCS

## 2025-08-15 ENCOUNTER — OFFICE VISIT (OUTPATIENT)
Age: 75
End: 2025-08-15
Payer: MEDICARE

## 2025-08-15 DIAGNOSIS — E11.65 TYPE 2 DIABETES MELLITUS WITH HYPERGLYCEMIA, WITHOUT LONG-TERM CURRENT USE OF INSULIN (HCC): Primary | ICD-10-CM

## 2025-08-15 PROCEDURE — G8428 CUR MEDS NOT DOCUMENT: HCPCS

## 2025-08-15 PROCEDURE — G8417 CALC BMI ABV UP PARAM F/U: HCPCS

## 2025-08-15 PROCEDURE — G0108 DIAB MANAGE TRN  PER INDIV: HCPCS

## 2025-08-29 ENCOUNTER — OFFICE VISIT (OUTPATIENT)
Age: 75
End: 2025-08-29
Payer: MEDICARE

## 2025-08-29 DIAGNOSIS — E11.65 TYPE 2 DIABETES MELLITUS WITH HYPERGLYCEMIA, WITHOUT LONG-TERM CURRENT USE OF INSULIN (HCC): Primary | ICD-10-CM

## 2025-08-29 PROCEDURE — G8417 CALC BMI ABV UP PARAM F/U: HCPCS

## 2025-08-29 PROCEDURE — G0108 DIAB MANAGE TRN  PER INDIV: HCPCS

## 2025-08-29 PROCEDURE — G8428 CUR MEDS NOT DOCUMENT: HCPCS

## (undated) DEVICE — ZIMMER® STERILE DISPOSABLE TOURNIQUET CUFF WITH PROTECTIVE SLEEVE AND PLC, DUAL PORT, SINGLE BLADDER, 34 IN. (86 CM)

## (undated) DEVICE — BLADELESS OPTICAL TROCAR WITH FIXATION CANNULA: Brand: VERSAPORT

## (undated) DEVICE — DRESSING STERILE PETRO W3XL8IN N ADH OIL EMUL GZ CURAD

## (undated) DEVICE — GLOVE ORTHO 7 1/2   MSG9475

## (undated) DEVICE — DYONICS 2.9 MM FULL RADIUS BLADES,                                    7 CM LENGTH, RED, PACKAGED 6 PER                                    BOX, STERILE: Brand: DYONICS POWERMINI

## (undated) DEVICE — ACCESS SHEATH WITH DILATOR: Brand: WATCHMAN™ TRUSEAL™ ACCESS SYSTEM

## (undated) DEVICE — SET ADMIN 16ML TBNG L100IN 2 Y INJ SITE IV PIGGY BK DISP

## (undated) DEVICE — Device

## (undated) DEVICE — SOLUTION IRRIG 1000ML 0.9% SOD CHL USP POUR PLAS BTL

## (undated) DEVICE — BIT DRL DIA4.3MM CALIB ANK COMPR NAILING SYS

## (undated) DEVICE — BLADE,CARBON-STEEL,15,STRL,DISPOSABLE,TB: Brand: MEDLINE

## (undated) DEVICE — EXTREMITY-MRMC: Brand: MEDLINE INDUSTRIES, INC.

## (undated) DEVICE — PAD,ABDOMINAL,5"X9",ST,LF,25/BX: Brand: MEDLINE INDUSTRIES, INC.

## (undated) DEVICE — TOWEL 4 PLY TISS 19X30 SUE WHT

## (undated) DEVICE — GUIDEWIRE ORTH THRD 1.4X150 MM FOR CANN SCREW NS 03333012

## (undated) DEVICE — ZIMMER® STERILE DISPOSABLE TOURNIQUET CUFF WITH PROTECTIVE SLEEVE AND PLC, DUAL PORT, SINGLE BLADDER, 24 IN. (61 CM)

## (undated) DEVICE — SOLUTION IRRIGATION LR 3000 ML USP TITAN XL CONTAINER 4/CA

## (undated) DEVICE — SWAB CULT LIQ STUART AGR AERB MOD IN BRK SGL RAYON TIP PLAS

## (undated) DEVICE — TUBING IRRIG L77IN DIA0.241IN L BOR FOR CYSTO W/ NVENT

## (undated) DEVICE — GOWN,SIRUS,NONRNF,SETINSLV,2XL,18/CS: Brand: MEDLINE

## (undated) DEVICE — BANDAGE COMPR M W6INXL10YD WHT BGE VELC E MTRX HK AND LOOP

## (undated) DEVICE — SOLUTION SCRB 2OZ 10% POVIDONE IOD ANTIMIC BTL

## (undated) DEVICE — BIT DRL DIA9MM ENTRY FOR ANK COMPR NAILING SYS

## (undated) DEVICE — SUTURE MONOCRYL SZ 3-0 L27IN ABSRB UD L19MM PS-2 3/8 CIR PRIM Y427H

## (undated) DEVICE — 1 X VERSACROSS TRANSSEPTAL SHEATH (INCLUDING  1 X J-TIP GUIDEWIRE); 1 X VERSACROSS RF WIRE (INCLUDING 1 X CONNECTOR CABLE (SINGLE USE)); 1 X DISPERSIVE ELECTRODE: Brand: VERSACROSS ACCESS SOLUTION

## (undated) DEVICE — ADULT SPO2 SENSOR: Brand: NELLCOR

## (undated) DEVICE — NEEDLE HYPO 18GA L1.5IN PNK S STL HUB POLYPR SHLD REG BVL

## (undated) DEVICE — SUTURE ETHILON SZ 3-0 L18IN NONABSORBABLE BLK L24MM PS-1 3/8 1663G

## (undated) DEVICE — TUBING PRSS MON L6IN PVC M FEM CONN

## (undated) DEVICE — SPONGE LAP W18XL18IN WHT COT 4 PLY FLD STRUNG RADPQ DISP ST 2 PER PACK

## (undated) DEVICE — SUTURE VCRL SZ 2-0 L27IN ABSRB UD L26MM SH 1/2 CIR J417H

## (undated) DEVICE — SOLUTION IRRIG 3000ML 0.9% SOD CHL USP UROMATIC PLAS CONT

## (undated) DEVICE — GLOVE ORANGE PI 7 1/2   MSG9075

## (undated) DEVICE — EXTREMITY - SMH: Brand: MEDLINE INDUSTRIES, INC.

## (undated) DEVICE — ELECTRODE ES 36CM LAP FLAT L HK COAT DISP CLEANCOAT

## (undated) DEVICE — TRAP FLUID BUFFALO FLTR

## (undated) DEVICE — TOWEL,OR,DSP,ST,BLUE,STD,2/PK,40PK/CS: Brand: MEDLINE

## (undated) DEVICE — PADDING CAST 4 INX5 YD STRL

## (undated) DEVICE — PADDING CAST W6INXL4YD ST COT RAYON MICROPLEATED HIGHLY

## (undated) DEVICE — PENCIL SMK EVAC ALL IN 1 DSGN ENH VISIBILITY IMPROVED AIR

## (undated) DEVICE — DRESSING,GAUZE,XEROFORM,CURAD,5"X9",ST: Brand: CURAD

## (undated) DEVICE — APPLIER CLP M/L SHFT DIA5MM 15 LIG LIGAMAX 5

## (undated) DEVICE — TUBING SUCT 12FR MAL ALUM SHFT FN CAP VENT UNIV CONN W/ OBT

## (undated) DEVICE — SYR POWER 150ML 8IN FILL TUBE --

## (undated) DEVICE — SHTH INTRO FLEX 16FR 33CM -- DRYSEAL

## (undated) DEVICE — GLOVE SURG SZ 65 THK91MIL LTX FREE SYN POLYISOPRENE

## (undated) DEVICE — SUTURE ETHLN SZ 3-0 L18IN NONABSORBABLE BLK PS-2 L19MM 3/8 1669H

## (undated) DEVICE — BANDAGE COBAN 4 IN COMPR W4INXL5YD FOAM COHESIVE QUIK STK SELF ADH SFT

## (undated) DEVICE — ZIMMER® STERILE DISPOSABLE TOURNIQUET CUFF WITH PLC, DUAL PORT, SINGLE BLADDER, 34 IN. (86 CM)

## (undated) DEVICE — SPONGE GZ W4XL4IN COT 12 PLY TYP VII WVN C FLD DSGN STERILE

## (undated) DEVICE — KENDALL RADIOLUCENT FOAM MONITORING ELECTRODE RECTANGULAR SHAPE: Brand: KENDALL

## (undated) DEVICE — BAG SPEC RETRV 275ML 10ML DISPOSABLE RELIACATCH

## (undated) DEVICE — KENDALL SCD EXPRESS SLEEVES, KNEE LENGTH, MEDIUM: Brand: KENDALL SCD

## (undated) DEVICE — SYR 3ML LL TIP 1/10ML GRAD --

## (undated) DEVICE — DRAPE,REIN 53X77,STERILE: Brand: MEDLINE

## (undated) DEVICE — SYRINGE MED 10ML LUERLOCK TIP W/O SFTY DISP

## (undated) DEVICE — SNAP KOVER: Brand: UNBRANDED

## (undated) DEVICE — HANDLE LT SNAP ON ULT DURABLE LENS FOR TRUMPF ALC DISPOSABLE

## (undated) DEVICE — BANDAGE,GAUZE,BULKEE II,4.5"X4.1YD,STRL: Brand: MEDLINE

## (undated) DEVICE — CATH IV AUTOGRD BC BLU 22GA 25 -- INSYTE

## (undated) DEVICE — APPLICATOR MEDICATED 26 CC SOLUTION HI LT ORNG CHLORAPREP

## (undated) DEVICE — SPLINT THMB W5XL30IN FBRGLS PD PRECUT LTWT DURABLE FAST SET

## (undated) DEVICE — BANDAGE,ELASTIC,ESMARK,STERILE,4"X9',LF: Brand: MEDLINE

## (undated) DEVICE — PADDING UNDERCAST W4INXL12FT RAYON POLY SYN NONADHESIVE

## (undated) DEVICE — GUIDEWIRE VASC L145CM 0.035IN J TIP L3MM PTFE FIX COR NAMIC

## (undated) DEVICE — HEART CATH-MRMC: Brand: MEDLINE INDUSTRIES, INC.

## (undated) DEVICE — DRESSING NEG PRSS 20CM PREVENA

## (undated) DEVICE — PINNACLE INTRODUCER SHEATH: Brand: PINNACLE

## (undated) DEVICE — GOWN,SIRUS,NONRNF,SETINSLV,XL,20/CS: Brand: MEDLINE

## (undated) DEVICE — DEVON™ KNEE AND BODY STRAP 60" X 3" (1.5 M X 7.6 CM): Brand: DEVON

## (undated) DEVICE — PADDING,UNDERCAST,COTTON, 4"X4YD STERILE: Brand: MEDLINE

## (undated) DEVICE — AIRLIFE™  ADULT CUSHION NASAL CANNULA WITH 7 FOOT (2.1 M) CRUSH-RESISTANT OXYGEN TUBING, AND U/CONNECT-IT ADAPTER: Brand: AIRLIFE™

## (undated) DEVICE — KIT ARMOR C DRP COLLAPSIBLE AND SELF EXP TOP CVR FOR FLUOROSCOPIC

## (undated) DEVICE — MAJOR LAPAROTOMY-MRMC: Brand: MEDLINE INDUSTRIES, INC.

## (undated) DEVICE — PRESSURE MONITORING SET: Brand: TRUWAVE

## (undated) DEVICE — PACK PROCEDURE SURG HRT CATH

## (undated) DEVICE — 1200 GUARD II KIT W/5MM TUBE W/O VAC TUBE: Brand: GUARDIAN

## (undated) DEVICE — MEDI-TRACE CADENCE ADULT, DEFIBRILLATION ELECTRODE -RTS  (10 PR/PK) - PHILIPS: Brand: MEDI-TRACE CADENCE

## (undated) DEVICE — CONTAINER SPEC ANAERB VACTNR

## (undated) DEVICE — BIT DRL L110MM DIA2.5MM G QUIK CPL W/O STP REUSE

## (undated) DEVICE — APPLICATOR BNDG 1MM ADH PREMIERPRO EXOFIN

## (undated) DEVICE — REM POLYHESIVE ADULT PATIENT RETURN ELECTRODE: Brand: VALLEYLAB

## (undated) DEVICE — DECANTER FLD L85IN IV FLX TBNG CLMP DLP

## (undated) DEVICE — DRAPE,EXTREMITY,89X128,STERILE: Brand: MEDLINE

## (undated) DEVICE — GLOVE SURG SZ 7 L12IN FNGR THK79MIL GRN LTX FREE

## (undated) DEVICE — CATHETER ETER CARD MULTIPAK MULTIPAK 5FR PERFORMA

## (undated) DEVICE — Z DISCONTINUED PER MEDLINE LINE GAS SAMPLING O2/CO2 LNG AD 13 FT NSL W/ TBNG FILTERLINE

## (undated) DEVICE — EVAC SMOKE SEECLEAR XCL -- SEE CLEAR

## (undated) DEVICE — X-RAY DETECTABLE SPONGES,16 PLY: Brand: VISTEC

## (undated) DEVICE — ALCOHOL RUBBING ISO 16OZ 70%

## (undated) DEVICE — BLUNT TROCAR WITH THREADED ANCHOR: Brand: VERSAONE

## (undated) DEVICE — SUTURE SZ 0 27IN 5/8 CIR UR-6  TAPER PT VIOLET ABSRB VICRYL J603H

## (undated) DEVICE — NEEDLE HYPO 25GA L1.5IN BVL ORIENTED ECLIPSE

## (undated) DEVICE — (D)PREP SKN CHLRAPRP APPL 26ML -- CONVERT TO ITEM 371833

## (undated) DEVICE — SUTURE MCRYL SZ 5 0 L18IN ABSRB UD PC 5 L19MM 1 2 CIR Y822G

## (undated) DEVICE — TUBING SUCT 10FR MAL ALUM SHFT FN CAP VENT UNIV CONN W/ OBT

## (undated) DEVICE — STERILE POLYISOPRENE POWDER-FREE SURGICAL GLOVES WITH EMOLLIENT COATING: Brand: PROTEXIS

## (undated) DEVICE — 3M™ IOBAN™ 2 ANTIMICROBIAL INCISE DRAPE 6648EZ: Brand: IOBAN™ 2

## (undated) DEVICE — GLOVE ORANGE PI 8   MSG9080

## (undated) DEVICE — SUTURE NONABSORBABLE MONOFILAMENT 3-0 PS-1 18 IN BLK ETHILON 1663H

## (undated) DEVICE — ANGIOGRAPHY KIT CUST [K0910930B] [MERIT MEDICAL SYSTEMS INC]

## (undated) DEVICE — PERCLOSE PROGLIDE™ SUTURE-MEDIATED CLOSURE SYSTEM: Brand: PERCLOSE PROGLIDE™

## (undated) DEVICE — SURGICAL PROCEDURE KIT GEN LAPAROSCOPY LF

## (undated) DEVICE — REAMER SURG DIA13MM ENTRY FOR ANK COMPR NAILING SYS

## (undated) DEVICE — INFECTION CONTROL KIT SYS

## (undated) DEVICE — DRAPE,U/ SHT,SPLIT,PLAS,STERIL: Brand: MEDLINE

## (undated) DEVICE — CATHETER ANGIO JR4 PIG 145 DEG 6 FRX100 CM MP SUPER TORQUE +

## (undated) DEVICE — SUTURE VICRYL SZ 2-0 L27IN ABSRB UD L26MM SH 1/2 CIR J417H

## (undated) DEVICE — YANKAUER,SMOOTH HANDLE,HIGH CAPACITY: Brand: MEDLINE INDUSTRIES, INC.

## (undated) DEVICE — GUIDEWIRE EXT FIX L400MM DIA3.2MM ENTRY FOR ANK COMPR

## (undated) DEVICE — STERILE POLYISOPRENE POWDER-FREE SURGICAL GLOVES: Brand: PROTEXIS

## (undated) DEVICE — SOLIDIFIER MEDC 1200ML -- CONVERT TO 356117

## (undated) DEVICE — DRESSING HEMOSTATIC SFT INTVENT W/O SLT DBL WRP QUIKCLOT LF

## (undated) DEVICE — BIT DRL DIA7MM ENTRY FOR ANK COMPR NAILING SYS

## (undated) DEVICE — SYR 10ML LUER LOK 1/5ML GRAD --

## (undated) DEVICE — PREMIUM WET SKIN PREP TRAY: Brand: MEDLINE INDUSTRIES, INC.

## (undated) DEVICE — UNIVERSAL FIXATION CANNULA: Brand: VERSAONE

## (undated) DEVICE — GUIDEWIRE ORTH L800MM DIA3MM BALL TIP

## (undated) DEVICE — BASIN EMSIS 16OZ GRAPHITE PLAS KID SHP MOLD GRAD FOR ORAL